# Patient Record
Sex: MALE | Race: BLACK OR AFRICAN AMERICAN | Employment: UNEMPLOYED | URBAN - METROPOLITAN AREA
[De-identification: names, ages, dates, MRNs, and addresses within clinical notes are randomized per-mention and may not be internally consistent; named-entity substitution may affect disease eponyms.]

---

## 2019-11-26 ENCOUNTER — APPOINTMENT (OUTPATIENT)
Dept: CT IMAGING | Age: 19
End: 2019-11-26
Payer: COMMERCIAL

## 2019-11-26 ENCOUNTER — HOSPITAL ENCOUNTER (EMERGENCY)
Age: 19
Discharge: HOME | End: 2019-11-26
Attending: EMERGENCY MEDICINE
Payer: COMMERCIAL

## 2019-11-26 ENCOUNTER — APPOINTMENT (OUTPATIENT)
Dept: GENERAL RADIOLOGY | Age: 19
End: 2019-11-26
Payer: COMMERCIAL

## 2019-11-26 VITALS
SYSTOLIC BLOOD PRESSURE: 121 MMHG | WEIGHT: 180 LBS | DIASTOLIC BLOOD PRESSURE: 62 MMHG | RESPIRATION RATE: 16 BRPM | HEIGHT: 67 IN | HEART RATE: 56 BPM | BODY MASS INDEX: 28.25 KG/M2 | OXYGEN SATURATION: 99 % | TEMPERATURE: 98 F

## 2019-11-26 DIAGNOSIS — R07.9 CHEST PAIN, UNSPECIFIED TYPE: ICD-10-CM

## 2019-11-26 DIAGNOSIS — D57.00 SICKLE CELL PAIN CRISIS (HCC): Primary | ICD-10-CM

## 2019-11-26 LAB
A/G RATIO: 1.7 (ref 1.1–2.2)
ALBUMIN SERPL-MCNC: 4.7 G/DL (ref 3.4–5)
ALP BLD-CCNC: 57 U/L (ref 40–129)
ALT SERPL-CCNC: 15 U/L (ref 10–40)
ANION GAP SERPL CALCULATED.3IONS-SCNC: 12 MMOL/L (ref 3–16)
ANISOCYTOSIS: ABNORMAL
AST SERPL-CCNC: 40 U/L (ref 15–37)
BASOPHILIC STIPPLING: ABNORMAL
BASOPHILS ABSOLUTE: 0 K/UL (ref 0–0.2)
BASOPHILS RELATIVE PERCENT: 0 %
BILIRUB SERPL-MCNC: 4.8 MG/DL (ref 0–1)
BUN BLDV-MCNC: 8 MG/DL (ref 7–20)
BURR CELLS: ABNORMAL
CALCIUM SERPL-MCNC: 9.1 MG/DL (ref 8.3–10.6)
CHLORIDE BLD-SCNC: 107 MMOL/L (ref 99–110)
CO2: 23 MMOL/L (ref 21–32)
CREAT SERPL-MCNC: 0.6 MG/DL (ref 0.9–1.3)
CRENATED RBC'S: ABNORMAL
EKG ATRIAL RATE: 56 BPM
EKG DIAGNOSIS: NORMAL
EKG P AXIS: 4 DEGREES
EKG P-R INTERVAL: 164 MS
EKG Q-T INTERVAL: 418 MS
EKG QRS DURATION: 98 MS
EKG QTC CALCULATION (BAZETT): 403 MS
EKG R AXIS: 72 DEGREES
EKG T AXIS: 14 DEGREES
EKG VENTRICULAR RATE: 56 BPM
EOSINOPHILS ABSOLUTE: 0 K/UL (ref 0–0.6)
EOSINOPHILS RELATIVE PERCENT: 0 %
GFR AFRICAN AMERICAN: >60
GFR NON-AFRICAN AMERICAN: >60
GLOBULIN: 2.8 G/DL
GLUCOSE BLD-MCNC: 89 MG/DL (ref 70–99)
HCT VFR BLD CALC: 27.8 % (ref 40.5–52.5)
HEMATOLOGY PATH CONSULT: YES
HEMOGLOBIN: 9.6 G/DL (ref 13.5–17.5)
HOWELL-JOLLY BODIES: ABNORMAL
HYPOCHROMIA: ABNORMAL
IMMATURE RETIC FRACT: 0.67 (ref 0.21–0.37)
LYMPHOCYTES ABSOLUTE: 1.3 K/UL (ref 1–5.1)
LYMPHOCYTES RELATIVE PERCENT: 13 %
MACROCYTES: ABNORMAL
MCH RBC QN AUTO: 35.3 PG (ref 26–34)
MCHC RBC AUTO-ENTMCNC: 34.6 G/DL (ref 31–36)
MCV RBC AUTO: 101.9 FL (ref 80–100)
MICROCYTES: ABNORMAL
MONOCYTES ABSOLUTE: 0.5 K/UL (ref 0–1.3)
MONOCYTES RELATIVE PERCENT: 5 %
NEUTROPHILS ABSOLUTE: 8.2 K/UL (ref 1.7–7.7)
NEUTROPHILS RELATIVE PERCENT: 82 %
NUCLEATED RED BLOOD CELLS: 3 /100 WBC
OVALOCYTES: ABNORMAL
PDW BLD-RTO: 20.6 % (ref 12.4–15.4)
PLATELET # BLD: 471 K/UL (ref 135–450)
PLATELET SLIDE REVIEW: ABNORMAL
PMV BLD AUTO: 8.9 FL (ref 5–10.5)
POIKILOCYTES: ABNORMAL
POLYCHROMASIA: ABNORMAL
POTASSIUM REFLEX MAGNESIUM: 4.8 MMOL/L (ref 3.5–5.1)
PRO-BNP: 20 PG/ML (ref 0–124)
RBC # BLD: 2.73 M/UL (ref 4.2–5.9)
RETICULOCYTE ABSOLUTE COUNT: 0.37 M/UL
RETICULOCYTE COUNT PCT: 13.52 % (ref 0.5–2.18)
SCHISTOCYTES: ABNORMAL
SICKLE CELLS: ABNORMAL
SLIDE REVIEW: ABNORMAL
SODIUM BLD-SCNC: 142 MMOL/L (ref 136–145)
TARGET CELLS: ABNORMAL
TEAR DROP CELLS: ABNORMAL
TOTAL PROTEIN: 7.5 G/DL (ref 6.4–8.2)
TROPONIN: <0.01 NG/ML
WBC # BLD: 10 K/UL (ref 4–11)

## 2019-11-26 PROCEDURE — 93005 ELECTROCARDIOGRAM TRACING: CPT | Performed by: EMERGENCY MEDICINE

## 2019-11-26 PROCEDURE — 83880 ASSAY OF NATRIURETIC PEPTIDE: CPT

## 2019-11-26 PROCEDURE — 96374 THER/PROPH/DIAG INJ IV PUSH: CPT

## 2019-11-26 PROCEDURE — 99284 EMERGENCY DEPT VISIT MOD MDM: CPT

## 2019-11-26 PROCEDURE — 93010 ELECTROCARDIOGRAM REPORT: CPT | Performed by: INTERNAL MEDICINE

## 2019-11-26 PROCEDURE — 85025 COMPLETE CBC W/AUTO DIFF WBC: CPT

## 2019-11-26 PROCEDURE — 71260 CT THORAX DX C+: CPT

## 2019-11-26 PROCEDURE — 85045 AUTOMATED RETICULOCYTE COUNT: CPT

## 2019-11-26 PROCEDURE — 71046 X-RAY EXAM CHEST 2 VIEWS: CPT

## 2019-11-26 PROCEDURE — 80053 COMPREHEN METABOLIC PANEL: CPT

## 2019-11-26 PROCEDURE — 84484 ASSAY OF TROPONIN QUANT: CPT

## 2019-11-26 PROCEDURE — 36415 COLL VENOUS BLD VENIPUNCTURE: CPT

## 2019-11-26 PROCEDURE — 6370000000 HC RX 637 (ALT 250 FOR IP): Performed by: PHYSICIAN ASSISTANT

## 2019-11-26 PROCEDURE — 96376 TX/PRO/DX INJ SAME DRUG ADON: CPT

## 2019-11-26 PROCEDURE — 6360000004 HC RX CONTRAST MEDICATION: Performed by: EMERGENCY MEDICINE

## 2019-11-26 PROCEDURE — 96375 TX/PRO/DX INJ NEW DRUG ADDON: CPT

## 2019-11-26 PROCEDURE — 6360000002 HC RX W HCPCS: Performed by: EMERGENCY MEDICINE

## 2019-11-26 PROCEDURE — 2580000003 HC RX 258: Performed by: EMERGENCY MEDICINE

## 2019-11-26 RX ORDER — HYDROXYUREA 500 MG/1
CAPSULE ORAL DAILY
COMMUNITY
End: 2020-07-22

## 2019-11-26 RX ORDER — OXYCODONE HYDROCHLORIDE AND ACETAMINOPHEN 5; 325 MG/1; MG/1
1 TABLET ORAL ONCE
Status: COMPLETED | OUTPATIENT
Start: 2019-11-26 | End: 2019-11-26

## 2019-11-26 RX ORDER — HYDROMORPHONE HYDROCHLORIDE 1 MG/ML
1 INJECTION, SOLUTION INTRAMUSCULAR; INTRAVENOUS; SUBCUTANEOUS
Status: COMPLETED | OUTPATIENT
Start: 2019-11-26 | End: 2019-11-26

## 2019-11-26 RX ORDER — KETOROLAC TROMETHAMINE 30 MG/ML
30 INJECTION, SOLUTION INTRAMUSCULAR; INTRAVENOUS ONCE
Status: COMPLETED | OUTPATIENT
Start: 2019-11-26 | End: 2019-11-26

## 2019-11-26 RX ORDER — ONDANSETRON 2 MG/ML
4 INJECTION INTRAMUSCULAR; INTRAVENOUS
Status: DISCONTINUED | OUTPATIENT
Start: 2019-11-26 | End: 2019-11-26 | Stop reason: HOSPADM

## 2019-11-26 RX ORDER — 0.9 % SODIUM CHLORIDE 0.9 %
1000 INTRAVENOUS SOLUTION INTRAVENOUS ONCE
Status: COMPLETED | OUTPATIENT
Start: 2019-11-26 | End: 2019-11-26

## 2019-11-26 RX ADMIN — IOPAMIDOL 75 ML: 755 INJECTION, SOLUTION INTRAVENOUS at 14:55

## 2019-11-26 RX ADMIN — ONDANSETRON 4 MG: 2 INJECTION INTRAMUSCULAR; INTRAVENOUS at 13:51

## 2019-11-26 RX ADMIN — HYDROMORPHONE HYDROCHLORIDE 1 MG: 1 INJECTION, SOLUTION INTRAMUSCULAR; INTRAVENOUS; SUBCUTANEOUS at 13:52

## 2019-11-26 RX ADMIN — HYDROMORPHONE HYDROCHLORIDE 1 MG: 1 INJECTION, SOLUTION INTRAMUSCULAR; INTRAVENOUS; SUBCUTANEOUS at 15:22

## 2019-11-26 RX ADMIN — SODIUM CHLORIDE 1000 ML: 9 INJECTION, SOLUTION INTRAVENOUS at 13:51

## 2019-11-26 RX ADMIN — OXYCODONE HYDROCHLORIDE AND ACETAMINOPHEN 1 TABLET: 5; 325 TABLET ORAL at 16:06

## 2019-11-26 RX ADMIN — KETOROLAC TROMETHAMINE 30 MG: 30 INJECTION, SOLUTION INTRAMUSCULAR at 13:51

## 2019-11-26 ASSESSMENT — PAIN DESCRIPTION - PAIN TYPE: TYPE: CHRONIC PAIN

## 2019-11-26 ASSESSMENT — PAIN SCALES - GENERAL
PAINLEVEL_OUTOF10: 8
PAINLEVEL_OUTOF10: 8
PAINLEVEL_OUTOF10: 7
PAINLEVEL_OUTOF10: 7

## 2019-11-27 LAB — HEMATOLOGY PATH CONSULT: NORMAL

## 2019-12-18 ENCOUNTER — HOSPITAL ENCOUNTER (OUTPATIENT)
Dept: CARDIOLOGY | Age: 19
Discharge: HOME | End: 2019-12-18
Payer: COMMERCIAL

## 2019-12-18 LAB
LV EF: 55 %
LVEF MODALITY: NORMAL

## 2019-12-18 PROCEDURE — 93306 TTE W/DOPPLER COMPLETE: CPT

## 2020-02-08 ENCOUNTER — HOSPITAL ENCOUNTER (EMERGENCY)
Age: 20
Discharge: HOME | End: 2020-02-08
Attending: EMERGENCY MEDICINE
Payer: COMMERCIAL

## 2020-02-08 VITALS
DIASTOLIC BLOOD PRESSURE: 56 MMHG | SYSTOLIC BLOOD PRESSURE: 102 MMHG | WEIGHT: 180 LBS | BODY MASS INDEX: 28.25 KG/M2 | HEART RATE: 81 BPM | OXYGEN SATURATION: 98 % | RESPIRATION RATE: 18 BRPM | TEMPERATURE: 98.5 F | HEIGHT: 67 IN

## 2020-02-08 LAB
A/G RATIO: 1.5 (ref 1.1–2.2)
ALBUMIN SERPL-MCNC: 4.5 G/DL (ref 3.4–5)
ALP BLD-CCNC: 66 U/L (ref 40–129)
ALT SERPL-CCNC: 14 U/L (ref 10–40)
ANION GAP SERPL CALCULATED.3IONS-SCNC: 11 MMOL/L (ref 3–16)
ANISOCYTOSIS: ABNORMAL
AST SERPL-CCNC: 18 U/L (ref 15–37)
BASOPHILS ABSOLUTE: 0.1 K/UL (ref 0–0.2)
BASOPHILS RELATIVE PERCENT: 1.3 %
BILIRUB SERPL-MCNC: 3.2 MG/DL (ref 0–1)
BUN BLDV-MCNC: 8 MG/DL (ref 7–20)
CALCIUM SERPL-MCNC: 9.2 MG/DL (ref 8.3–10.6)
CHLORIDE BLD-SCNC: 106 MMOL/L (ref 99–110)
CO2: 24 MMOL/L (ref 21–32)
CREAT SERPL-MCNC: 0.7 MG/DL (ref 0.9–1.3)
EOSINOPHILS ABSOLUTE: 0.1 K/UL (ref 0–0.6)
EOSINOPHILS RELATIVE PERCENT: 1.1 %
GFR AFRICAN AMERICAN: >60
GFR NON-AFRICAN AMERICAN: >60
GLOBULIN: 3 G/DL
GLUCOSE BLD-MCNC: 88 MG/DL (ref 70–99)
HCT VFR BLD CALC: 27.1 % (ref 40.5–52.5)
HEMOGLOBIN: 9.1 G/DL (ref 13.5–17.5)
HYPOCHROMIA: ABNORMAL
IMMATURE RETIC FRACT: 0.62 (ref 0.21–0.37)
LYMPHOCYTES ABSOLUTE: 2.7 K/UL (ref 1–5.1)
LYMPHOCYTES RELATIVE PERCENT: 23 %
MACROCYTES: ABNORMAL
MCH RBC QN AUTO: 33.3 PG (ref 26–34)
MCHC RBC AUTO-ENTMCNC: 33.6 G/DL (ref 31–36)
MCV RBC AUTO: 99 FL (ref 80–100)
MONOCYTES ABSOLUTE: 1.4 K/UL (ref 0–1.3)
MONOCYTES RELATIVE PERCENT: 11.8 %
NEUTROPHILS ABSOLUTE: 7.3 K/UL (ref 1.7–7.7)
NEUTROPHILS RELATIVE PERCENT: 62.8 %
PDW BLD-RTO: 20 % (ref 12.4–15.4)
PLATELET # BLD: 493 K/UL (ref 135–450)
PLATELET SLIDE REVIEW: ABNORMAL
PMV BLD AUTO: 8.3 FL (ref 5–10.5)
POIKILOCYTES: ABNORMAL
POLYCHROMASIA: ABNORMAL
POTASSIUM SERPL-SCNC: 4.3 MMOL/L (ref 3.5–5.1)
RBC # BLD: 2.74 M/UL (ref 4.2–5.9)
RETICULOCYTE ABSOLUTE COUNT: 0.29 M/UL
RETICULOCYTE COUNT PCT: 10.53 % (ref 0.5–2.18)
SICKLE CELLS: ABNORMAL
SLIDE REVIEW: ABNORMAL
SODIUM BLD-SCNC: 141 MMOL/L (ref 136–145)
STOMATOCYTES: ABNORMAL
TARGET CELLS: ABNORMAL
TOTAL PROTEIN: 7.5 G/DL (ref 6.4–8.2)
WBC # BLD: 11.7 K/UL (ref 4–11)

## 2020-02-08 PROCEDURE — 85025 COMPLETE CBC W/AUTO DIFF WBC: CPT

## 2020-02-08 PROCEDURE — 96375 TX/PRO/DX INJ NEW DRUG ADDON: CPT

## 2020-02-08 PROCEDURE — 96376 TX/PRO/DX INJ SAME DRUG ADON: CPT

## 2020-02-08 PROCEDURE — 2580000003 HC RX 258: Performed by: EMERGENCY MEDICINE

## 2020-02-08 PROCEDURE — 99284 EMERGENCY DEPT VISIT MOD MDM: CPT

## 2020-02-08 PROCEDURE — 80053 COMPREHEN METABOLIC PANEL: CPT

## 2020-02-08 PROCEDURE — 85045 AUTOMATED RETICULOCYTE COUNT: CPT

## 2020-02-08 PROCEDURE — 96374 THER/PROPH/DIAG INJ IV PUSH: CPT

## 2020-02-08 PROCEDURE — 6370000000 HC RX 637 (ALT 250 FOR IP): Performed by: EMERGENCY MEDICINE

## 2020-02-08 PROCEDURE — 6360000002 HC RX W HCPCS: Performed by: EMERGENCY MEDICINE

## 2020-02-08 RX ORDER — OXYCODONE HYDROCHLORIDE 5 MG/1
10 TABLET ORAL ONCE
Status: COMPLETED | OUTPATIENT
Start: 2020-02-08 | End: 2020-02-08

## 2020-02-08 RX ORDER — 0.9 % SODIUM CHLORIDE 0.9 %
1000 INTRAVENOUS SOLUTION INTRAVENOUS ONCE
Status: COMPLETED | OUTPATIENT
Start: 2020-02-08 | End: 2020-02-08

## 2020-02-08 RX ORDER — FENTANYL CITRATE 50 UG/ML
75 INJECTION, SOLUTION INTRAMUSCULAR; INTRAVENOUS ONCE
Status: COMPLETED | OUTPATIENT
Start: 2020-02-08 | End: 2020-02-08

## 2020-02-08 RX ORDER — OXYCODONE HYDROCHLORIDE 10 MG/1
10 TABLET ORAL EVERY 6 HOURS PRN
Qty: 30 TABLET | Refills: 0 | Status: SHIPPED | OUTPATIENT
Start: 2020-02-08 | End: 2020-03-09

## 2020-02-08 RX ORDER — KETOROLAC TROMETHAMINE 30 MG/ML
15 INJECTION, SOLUTION INTRAMUSCULAR; INTRAVENOUS ONCE
Status: COMPLETED | OUTPATIENT
Start: 2020-02-08 | End: 2020-02-08

## 2020-02-08 RX ORDER — OXYCODONE HYDROCHLORIDE 15 MG/1
15 TABLET, FILM COATED, EXTENDED RELEASE ORAL PRN
COMMUNITY
End: 2020-02-08

## 2020-02-08 RX ADMIN — KETOROLAC TROMETHAMINE 15 MG: 30 INJECTION, SOLUTION INTRAMUSCULAR at 20:46

## 2020-02-08 RX ADMIN — OXYCODONE 10 MG: 5 TABLET ORAL at 22:25

## 2020-02-08 RX ADMIN — FENTANYL CITRATE 75 MCG: 50 INJECTION, SOLUTION INTRAMUSCULAR; INTRAVENOUS at 20:45

## 2020-02-08 RX ADMIN — FENTANYL CITRATE 75 MCG: 50 INJECTION, SOLUTION INTRAMUSCULAR; INTRAVENOUS at 21:39

## 2020-02-08 RX ADMIN — SODIUM CHLORIDE 1000 ML: 9 INJECTION, SOLUTION INTRAVENOUS at 20:46

## 2020-02-08 ASSESSMENT — PAIN SCALES - GENERAL
PAINLEVEL_OUTOF10: 8
PAINLEVEL_OUTOF10: 8
PAINLEVEL_OUTOF10: 5
PAINLEVEL_OUTOF10: 8

## 2020-02-08 ASSESSMENT — PAIN DESCRIPTION - DESCRIPTORS: DESCRIPTORS: ACHING

## 2020-02-08 ASSESSMENT — PAIN DESCRIPTION - LOCATION: LOCATION: LEG

## 2020-02-08 ASSESSMENT — PAIN DESCRIPTION - ORIENTATION: ORIENTATION: RIGHT;LEFT

## 2020-02-08 ASSESSMENT — PAIN DESCRIPTION - PAIN TYPE: TYPE: ACUTE PAIN

## 2020-02-09 NOTE — ED PROVIDER NOTES
History reviewed. No pertinent family history. SOCIAL HISTORY       Social History     Socioeconomic History    Marital status: Single     Spouse name: None    Number of children: None    Years of education: None    Highest education level: None   Occupational History    None   Social Needs    Financial resource strain: None    Food insecurity:     Worry: None     Inability: None    Transportation needs:     Medical: None     Non-medical: None   Tobacco Use    Smoking status: Never Smoker    Smokeless tobacco: Never Used   Substance and Sexual Activity    Alcohol use: Not Currently    Drug use: None    Sexual activity: None   Lifestyle    Physical activity:     Days per week: None     Minutes per session: None    Stress: None   Relationships    Social connections:     Talks on phone: None     Gets together: None     Attends Buddhism service: None     Active member of club or organization: None     Attends meetings of clubs or organizations: None     Relationship status: None    Intimate partner violence:     Fear of current or ex partner: None     Emotionally abused: None     Physically abused: None     Forced sexual activity: None   Other Topics Concern    None   Social History Narrative    None       SCREENINGS               PHYSICAL EXAM    (up to 7 for level 4, 8 or more for level 5)     ED Triage Vitals [02/08/20 1910]   BP Temp Temp Source Pulse Resp SpO2 Height Weight   (!) 102/51 98.6 °F (37 °C) Oral 75 18 97 % 5' 7\" (1.702 m) 180 lb (81.6 kg)       Physical Exam    General appearance:  Cooperative. No acute distress. Skin:  Warm. Dry. Eye:  Extraocular movements intact. Ears, nose, mouth and throat:  Oral mucosa moist,  Neck:  Trachea midline. Heart:  Regular rate and rhythm  Perfusion:  intact  Respiratory:  Lungs clear to auscultation bilaterally. Respirations nonlabored. Abdominal:   Non distended. Nontender  Neurological:  Alert and oriented x 3.   Moves all extremities spontaneously  Musculoskeletal:   Normal ROM, no deformities          Psychiatric:  Normal mood      DIAGNOSTIC RESULTS       Labs Reviewed   COMPREHENSIVE METABOLIC PANEL - Abnormal; Notable for the following components:       Result Value    CREATININE 0.7 (*)     Total Bilirubin 3.2 (*)     All other components within normal limits    Narrative:     Performed at:  [address] [place]   Phone [phone]   CBC WITH AUTO DIFFERENTIAL - Abnormal; Notable for the following components:    WBC 11.7 (*)     RBC 2.74 (*)     Hemoglobin 9.1 (*)     Hematocrit 27.1 (*)     RDW 20.0 (*)     Platelets 513 (*)     Monocytes Absolute 1.4 (*)     Anisocytosis 3+ (*)     Macrocytes 1+ (*)     Polychromasia 1+ (*)     Hypochromia 1+ (*)     Poikilocytes 2+ (*)     Stomatocytes 1+ (*)     Target Cells 1+ (*)     Sickle Cells 2+ (*)     All other components within normal limits    Narrative:     Performed at:  [address] [place]   Phone [phone]   RETICULOCYTES - Abnormal; Notable for the following components:    Retic Ct Pct 10.53 (*)     Immature Retic Fract 0.62 (*)     All other components within normal limits    Narrative:     Performed at:  [place] [address] [address] [place]   Phone [phone]       Interpretation per the Radiologist below, if obtained/available at the time of this note:    No orders to display       All other labs/imaging were within normal range or not returned as of this dictation. EMERGENCY DEPARTMENT COURSE and DIFFERENTIAL DIAGNOSIS/MDM:   Vitals:    Vitals:    02/08/20 1910   BP: (!) 102/51   Pulse: 75   Resp: 18   Temp: 98.6 °F (37 °C)   TempSrc: Oral   SpO2: 97%   Weight: 180 lb (81.6 kg)   Height: 5' 7\" (1.702 m)       Patient presents emergency department today with a sickle cell pain crisis.

## 2020-03-14 ENCOUNTER — APPOINTMENT (OUTPATIENT)
Dept: GENERAL RADIOLOGY | Age: 20
End: 2020-03-14
Payer: COMMERCIAL

## 2020-03-14 ENCOUNTER — HOSPITAL ENCOUNTER (EMERGENCY)
Age: 20
Discharge: HOME | End: 2020-03-14
Attending: EMERGENCY MEDICINE
Payer: COMMERCIAL

## 2020-03-14 VITALS
SYSTOLIC BLOOD PRESSURE: 118 MMHG | BODY MASS INDEX: 28.25 KG/M2 | DIASTOLIC BLOOD PRESSURE: 67 MMHG | WEIGHT: 180 LBS | RESPIRATION RATE: 18 BRPM | OXYGEN SATURATION: 98 % | HEIGHT: 67 IN | TEMPERATURE: 98.1 F | HEART RATE: 67 BPM

## 2020-03-14 LAB
A/G RATIO: 1.7 (ref 1.1–2.2)
ALBUMIN SERPL-MCNC: 4.3 G/DL (ref 3.4–5)
ALP BLD-CCNC: 55 U/L (ref 40–129)
ALT SERPL-CCNC: 15 U/L (ref 10–40)
ANION GAP SERPL CALCULATED.3IONS-SCNC: 10 MMOL/L (ref 3–16)
AST SERPL-CCNC: 23 U/L (ref 15–37)
BILIRUB SERPL-MCNC: 5.1 MG/DL (ref 0–1)
BILIRUBIN URINE: NEGATIVE
BLOOD, URINE: NEGATIVE
BUN BLDV-MCNC: 7 MG/DL (ref 7–20)
CALCIUM SERPL-MCNC: 9.1 MG/DL (ref 8.3–10.6)
CHLORIDE BLD-SCNC: 108 MMOL/L (ref 99–110)
CLARITY: CLEAR
CO2: 26 MMOL/L (ref 21–32)
COLOR: YELLOW
CREAT SERPL-MCNC: 0.6 MG/DL (ref 0.9–1.3)
GFR AFRICAN AMERICAN: >60
GFR NON-AFRICAN AMERICAN: >60
GLOBULIN: 2.6 G/DL
GLUCOSE BLD-MCNC: 98 MG/DL (ref 70–99)
GLUCOSE URINE: NEGATIVE MG/DL
HCT VFR BLD CALC: 25 % (ref 40.5–52.5)
HEMOGLOBIN: 8.8 G/DL (ref 13.5–17.5)
IMMATURE RETIC FRACT: 0.69 (ref 0.21–0.37)
KETONES, URINE: NEGATIVE MG/DL
LEUKOCYTE ESTERASE, URINE: NEGATIVE
MCH RBC QN AUTO: 34.1 PG (ref 26–34)
MCHC RBC AUTO-ENTMCNC: 35.3 G/DL (ref 31–36)
MCV RBC AUTO: 96.5 FL (ref 80–100)
MICROSCOPIC EXAMINATION: ABNORMAL
NITRITE, URINE: NEGATIVE
PDW BLD-RTO: 25 % (ref 12.4–15.4)
PH UA: 7 (ref 5–8)
PLATELET # BLD: 419 K/UL (ref 135–450)
PMV BLD AUTO: 8.9 FL (ref 5–10.5)
POTASSIUM SERPL-SCNC: 3.8 MMOL/L (ref 3.5–5.1)
PROTEIN UA: NEGATIVE MG/DL
RAPID INFLUENZA  B AGN: NEGATIVE
RAPID INFLUENZA A AGN: NEGATIVE
RBC # BLD: 2.59 M/UL (ref 4.2–5.9)
RETICULOCYTE ABSOLUTE COUNT: 0.29 M/UL
RETICULOCYTE COUNT PCT: 11.25 % (ref 0.5–2.18)
S PYO AG THROAT QL: NEGATIVE
SODIUM BLD-SCNC: 144 MMOL/L (ref 136–145)
SPECIFIC GRAVITY UA: 1.02 (ref 1–1.03)
TOTAL PROTEIN: 6.9 G/DL (ref 6.4–8.2)
URINE REFLEX TO CULTURE: ABNORMAL
URINE TYPE: ABNORMAL
UROBILINOGEN, URINE: >=8 E.U./DL
WBC # BLD: 14 K/UL (ref 4–11)

## 2020-03-14 PROCEDURE — 87081 CULTURE SCREEN ONLY: CPT

## 2020-03-14 PROCEDURE — 2580000003 HC RX 258: Performed by: NURSE PRACTITIONER

## 2020-03-14 PROCEDURE — 87880 STREP A ASSAY W/OPTIC: CPT

## 2020-03-14 PROCEDURE — 81003 URINALYSIS AUTO W/O SCOPE: CPT

## 2020-03-14 PROCEDURE — 80053 COMPREHEN METABOLIC PANEL: CPT

## 2020-03-14 PROCEDURE — 96374 THER/PROPH/DIAG INJ IV PUSH: CPT

## 2020-03-14 PROCEDURE — 87804 INFLUENZA ASSAY W/OPTIC: CPT

## 2020-03-14 PROCEDURE — 85027 COMPLETE CBC AUTOMATED: CPT

## 2020-03-14 PROCEDURE — 71046 X-RAY EXAM CHEST 2 VIEWS: CPT

## 2020-03-14 PROCEDURE — 6360000002 HC RX W HCPCS: Performed by: NURSE PRACTITIONER

## 2020-03-14 PROCEDURE — 6370000000 HC RX 637 (ALT 250 FOR IP): Performed by: NURSE PRACTITIONER

## 2020-03-14 PROCEDURE — 96375 TX/PRO/DX INJ NEW DRUG ADDON: CPT

## 2020-03-14 PROCEDURE — 99284 EMERGENCY DEPT VISIT MOD MDM: CPT

## 2020-03-14 PROCEDURE — 85045 AUTOMATED RETICULOCYTE COUNT: CPT

## 2020-03-14 RX ORDER — KETOROLAC TROMETHAMINE 30 MG/ML
15 INJECTION, SOLUTION INTRAMUSCULAR; INTRAVENOUS ONCE
Status: DISCONTINUED | OUTPATIENT
Start: 2020-03-14 | End: 2020-03-14

## 2020-03-14 RX ORDER — OXYCODONE HYDROCHLORIDE 10 MG/1
1 TABLET ORAL
COMMUNITY
Start: 2020-01-08 | End: 2020-03-14 | Stop reason: ALTCHOICE

## 2020-03-14 RX ORDER — FENTANYL CITRATE 50 UG/ML
75 INJECTION, SOLUTION INTRAMUSCULAR; INTRAVENOUS ONCE
Status: COMPLETED | OUTPATIENT
Start: 2020-03-14 | End: 2020-03-14

## 2020-03-14 RX ORDER — OXYCODONE HYDROCHLORIDE AND ACETAMINOPHEN 5; 325 MG/1; MG/1
1 TABLET ORAL ONCE
Status: COMPLETED | OUTPATIENT
Start: 2020-03-14 | End: 2020-03-14

## 2020-03-14 RX ORDER — ACETAMINOPHEN 500 MG
1000 TABLET ORAL ONCE
Status: DISCONTINUED | OUTPATIENT
Start: 2020-03-14 | End: 2020-03-14

## 2020-03-14 RX ORDER — 0.9 % SODIUM CHLORIDE 0.9 %
1000 INTRAVENOUS SOLUTION INTRAVENOUS ONCE
Status: COMPLETED | OUTPATIENT
Start: 2020-03-14 | End: 2020-03-14

## 2020-03-14 RX ORDER — OXYCODONE HYDROCHLORIDE 10 MG/1
10 TABLET ORAL EVERY 6 HOURS PRN
Qty: 12 TABLET | Refills: 0 | Status: SHIPPED | OUTPATIENT
Start: 2020-03-14 | End: 2020-03-17

## 2020-03-14 RX ADMIN — FENTANYL CITRATE 75 MCG: 50 INJECTION, SOLUTION INTRAMUSCULAR; INTRAVENOUS at 19:56

## 2020-03-14 RX ADMIN — HYDROMORPHONE HYDROCHLORIDE 1 MG: 1 INJECTION, SOLUTION INTRAMUSCULAR; INTRAVENOUS; SUBCUTANEOUS at 21:11

## 2020-03-14 RX ADMIN — SODIUM CHLORIDE 1000 ML: 9 INJECTION, SOLUTION INTRAVENOUS at 19:01

## 2020-03-14 RX ADMIN — OXYCODONE HYDROCHLORIDE AND ACETAMINOPHEN 1 TABLET: 5; 325 TABLET ORAL at 18:53

## 2020-03-14 ASSESSMENT — PAIN DESCRIPTION - LOCATION: LOCATION: BACK

## 2020-03-14 ASSESSMENT — ENCOUNTER SYMPTOMS
SHORTNESS OF BREATH: 0
COUGH: 1
ABDOMINAL PAIN: 0
CONSTIPATION: 0
BACK PAIN: 1
ABDOMINAL DISTENTION: 0
DIARRHEA: 0
NAUSEA: 0
WHEEZING: 0
VOMITING: 0
EYES NEGATIVE: 1
SORE THROAT: 1

## 2020-03-14 ASSESSMENT — PAIN SCALES - GENERAL
PAINLEVEL_OUTOF10: 8
PAINLEVEL_OUTOF10: 8
PAINLEVEL_OUTOF10: 9
PAINLEVEL_OUTOF10: 8

## 2020-03-14 ASSESSMENT — PAIN DESCRIPTION - PROGRESSION: CLINICAL_PROGRESSION: GRADUALLY WORSENING

## 2020-03-14 ASSESSMENT — PAIN DESCRIPTION - PAIN TYPE: TYPE: ACUTE PAIN

## 2020-03-14 ASSESSMENT — PAIN DESCRIPTION - ONSET: ONSET: ON-GOING

## 2020-03-14 ASSESSMENT — PAIN DESCRIPTION - DESCRIPTORS: DESCRIPTORS: ACHING

## 2020-03-14 ASSESSMENT — PAIN DESCRIPTION - FREQUENCY: FREQUENCY: CONTINUOUS

## 2020-03-14 NOTE — ED PROVIDER NOTES
**[facility]  [facility]  EMERGENCY DEPARTMENT ENCOUNTER      Pt Name: Rosana MAUROXP:[id number]  [place] 2000  Date of evaluation: 3/14/2020  Provider: CARMELITA Blanc CNP      Chief Complaint:    Chief Complaint   Patient presents with    Pharyngitis     Patient reports cough and sore throat for 2 days, denies travel hx of [facility]        Nursing Notes, Past Medical Hx, Past Surgical Hx, Social Hx, Allergies, and Family Hx were all reviewed and agreed with or any disagreements were addressed in the HPI.    HPI:  (Location, Duration, Timing, Severity, Quality, Assoc Sx, Context, Modifying factors)  This is a  20 y.o. male who present with two days of sore throat and cough. States he has not traveled outside of the [address] [facility] [facility] or been around anyone who has tested positive for the [employer] Lisa virus. Denies any chest pain, fevers, shortness of breath, headache, nausea, vomiting, body aches or weakness. States he does have back pain and does have sickle cell disease. Rates pain a 8/10    PastMedical/Surgical History:      Diagnosis Date    Asthma     Sickle cell anemia (HCC)          Procedure Laterality Date    SPLENECTOMY         Medications:  Previous Medications    HYDROXYUREA (HYDREA) 500 MG CHEMO CAPSULE    Take by mouth daily         Review of Systems:  Review of Systems   Constitutional: Negative for activity change, appetite change, chills, diaphoresis, fatigue and fever. HENT: Positive for sore throat. Eyes: Negative. Respiratory: Positive for cough. Negative for shortness of breath and wheezing. Cardiovascular: Negative for chest pain, palpitations and leg swelling. Gastrointestinal: Negative for abdominal distention, abdominal pain, constipation, diarrhea, nausea and vomiting. Endocrine: Negative. Genitourinary: Negative for difficulty urinating, dysuria, flank pain and hematuria.    Musculoskeletal: Positive for back pain. Negative for myalgias, neck pain and neck stiffness. Skin: Negative. Neurological: Negative for dizziness, seizures, syncope, speech difficulty, weakness, light-headedness, numbness and headaches. Hematological: Negative. Psychiatric/Behavioral: Negative. Positives and Pertinent negatives as per HPI. Except as noted above in the ROS, problem specific ROS was completed and is negative. Physical Exam:  Physical Exam  Constitutional:       General: He is not in acute distress. Appearance: Normal appearance. He is normal weight. He is not ill-appearing, toxic-appearing or diaphoretic. HENT:      Head: Normocephalic and atraumatic. Nose: Nose normal.      Mouth/Throat:      Mouth: Mucous membranes are moist.      Pharynx: Oropharynx is clear. No oropharyngeal exudate or posterior oropharyngeal erythema. Eyes:      Extraocular Movements: Extraocular movements intact. Conjunctiva/sclera: Conjunctivae normal.      Pupils: Pupils are equal, round, and reactive to light. Neck:      Musculoskeletal: Normal range of motion and neck supple. Cardiovascular:      Rate and Rhythm: Normal rate and regular rhythm. Pulses: Normal pulses. Heart sounds: Normal heart sounds. No murmur. No friction rub. No gallop. Pulmonary:      Effort: Pulmonary effort is normal. No respiratory distress. Breath sounds: Normal breath sounds. No stridor. No wheezing, rhonchi or rales. Chest:      Chest wall: No tenderness. Abdominal:      General: Abdomen is flat. Bowel sounds are normal. There is no distension. Palpations: There is no mass. Tenderness: There is no abdominal tenderness. There is no guarding. Musculoskeletal:         General: Tenderness present. No swelling. Lumbar back: He exhibits tenderness. He exhibits normal range of motion and no bony tenderness. Lymphadenopathy:      Cervical: No cervical adenopathy. Skin:     General: Skin is warm and dry. Capillary Refill: Capillary refill takes less than 2 seconds. Neurological:      General: No focal deficit present. Mental Status: He is alert and oriented to person, place, and time. Sensory: No sensory deficit. Gait: Gait normal.   Psychiatric:         Mood and Affect: Mood normal.         Behavior: Behavior normal.         Thought Content:  Thought content normal.         Judgment: Judgment normal.         MEDICAL DECISION MAKING    Vitals:    Vitals:    03/14/20 1814   BP: (!) 107/56   Pulse: 85   Resp: 16   Temp: 98.4 °F (36.9 °C)   TempSrc: Oral   SpO2: 95%   Weight: 180 lb (81.6 kg)   Height: 5' 7\" (1.702 m)       LABS:  Labs Reviewed   CBC - Abnormal; Notable for the following components:       Result Value    WBC 14.0 (*)     RBC 2.59 (*)     Hemoglobin 8.8 (*)     Hematocrit 25.0 (*)     MCH 34.1 (*)     RDW 25.0 (*)     All other components within normal limits    Narrative:     Performed at:  [place] [address] [address] [place]   Phone [phone]   COMPREHENSIVE METABOLIC PANEL - Abnormal; Notable for the following components:    CREATININE 0.6 (*)     Total Bilirubin 5.1 (*)     All other components within normal limits    Narrative:     Performed at:  [address] [place]   Phone [phone]   RETICULOCYTES - Abnormal; Notable for the following components:    Retic Ct Pct 11.25 (*)     Immature Retic Fract 0.69 (*)     All other components within normal limits    Narrative:     Performed at:  [place] [address] [facility]   Phone [phone]   URINE RT REFLEX TO CULTURE - Abnormal; Notable for the following components:    Urobilinogen, Urine >=8.0 (*)     All other components within normal limits    Narrative:     Performed at:  [place] [address] [facility] Pharynx is clear, uvula is midline and no exudate noted. No cervical adenopathy noted. Heart with RRR. Lungs are clear to auscultation throughout. No chest wall pain with palpation. Abdomen is soft, non-tender and non-distended. Lumbar spine with bilateral paraspinal tenderness of the back, no midline pain to palpation, FROM present. Bilateral upper and lower extremities with no pain to palpation and FROM is present. There is no pain noted over elbow or knee joints and no swelling of joints present. Distal pulses are present    Differentials: sickle cell crisis, strep pharyngitis, viral pharyngitis, URI, cough, bronchitis, pneumonia, acute chest syndrome    Labs: leukocytosis present and retic count is elevated. Negative flu and negative strep test noted    Xray: No radiographic evidence of acute cardiopulmonary disease. NS bolus given along with fentanyl, percocet and dilaudid with improvement of pain symptoms. Patient states he feels like at this point the pain is manageable and he is ready to go home    PDMP reviewed and patient was last discharged with oxycodone for pain and today we will be discharging with the same medication. A 3 days supply will be given from the ED  and patient has been given strict instructions to call PCP for follow up this week. Diagnosis: sickle cell crisis, viral pharyngitis, cough    The patient tolerated their visit well. I evaluated the patient. The physician was available for consultation as needed. The patient and / or the family were informed of the results of any tests, a time was given to answer questions, a plan was proposed and they agreed with plan. CLINICAL IMPRESSION:  1. Sickle cell pain crisis ([facility] [address]    2. Viral pharyngitis    3.  Cough        DISPOSITION Decision To Discharge 03/14/2020 10:35:59 PM      PATIENT REFERRED TO:  Indra Marrero MD  [address] [place] [address]  [phone]    Schedule an appointment as soon as possible for a visit in 3 days  For remi    [facility]  ED  [address] [facility] [address] [facility]  Go to   For new or worsening symptoms      DISCHARGE MEDICATIONS:  New Prescriptions    OXYCODONE HCL (OXY-IR) 10 MG IMMEDIATE RELEASE TABLET    Take 1 tablet by mouth every 6 hours as needed for Pain for up to 3 days. DISCONTINUED MEDICATIONS:  Discontinued Medications    OXYCODONE HCL (OXY-IR) 10 MG IMMEDIATE RELEASE TABLET    1 tablet.               (Please note the MDM and HPI sections of this note were completed with a voice recognition program.  Efforts were made to edit the dictations but occasionally words are mis-transcribed.)    Electronically signed, CARMELITA Zuniga CNP,        CARMELITA Zuniga CNP  03/14/20 Isaac Mcmillan. CARMELITA Stewart CNP  03/14/20 0605

## 2020-03-15 NOTE — ED NOTES
Patient discharged home alert and oriented, resp even and unlabored.  Patient denies needs or questions at this time       Stefanie Joy RN  03/14/20 5121

## 2020-03-17 LAB — S PYO THROAT QL CULT: NORMAL

## 2020-04-02 ENCOUNTER — APPOINTMENT (OUTPATIENT)
Dept: GENERAL RADIOLOGY | Age: 20
End: 2020-04-02
Payer: COMMERCIAL

## 2020-04-02 ENCOUNTER — APPOINTMENT (OUTPATIENT)
Dept: CT IMAGING | Age: 20
End: 2020-04-02
Payer: COMMERCIAL

## 2020-04-02 ENCOUNTER — HOSPITAL ENCOUNTER (EMERGENCY)
Age: 20
Discharge: HOME | End: 2020-04-02
Payer: COMMERCIAL

## 2020-04-02 VITALS
HEART RATE: 56 BPM | DIASTOLIC BLOOD PRESSURE: 59 MMHG | BODY MASS INDEX: 29.03 KG/M2 | SYSTOLIC BLOOD PRESSURE: 113 MMHG | TEMPERATURE: 98.2 F | HEIGHT: 67 IN | RESPIRATION RATE: 14 BRPM | WEIGHT: 185 LBS | OXYGEN SATURATION: 97 %

## 2020-04-02 LAB
A/G RATIO: 1.6 (ref 1.1–2.2)
ALBUMIN SERPL-MCNC: 4.4 G/DL (ref 3.4–5)
ALP BLD-CCNC: 56 U/L (ref 40–129)
ALT SERPL-CCNC: 12 U/L (ref 10–40)
ANION GAP SERPL CALCULATED.3IONS-SCNC: 10 MMOL/L (ref 3–16)
ANISOCYTOSIS: ABNORMAL
AST SERPL-CCNC: 42 U/L (ref 15–37)
ATYPICAL LYMPHOCYTE RELATIVE PERCENT: 1 % (ref 0–6)
BANDED NEUTROPHILS RELATIVE PERCENT: 3 % (ref 0–7)
BASOPHILS ABSOLUTE: 0 K/UL (ref 0–0.2)
BASOPHILS RELATIVE PERCENT: 0 %
BILIRUB SERPL-MCNC: 4.8 MG/DL (ref 0–1)
BUN BLDV-MCNC: 9 MG/DL (ref 7–20)
CALCIUM SERPL-MCNC: 9.5 MG/DL (ref 8.3–10.6)
CHLORIDE BLD-SCNC: 106 MMOL/L (ref 99–110)
CO2: 22 MMOL/L (ref 21–32)
CREAT SERPL-MCNC: <0.5 MG/DL (ref 0.9–1.3)
EOSINOPHILS ABSOLUTE: 0.4 K/UL (ref 0–0.6)
EOSINOPHILS RELATIVE PERCENT: 2 %
GFR AFRICAN AMERICAN: >60
GFR NON-AFRICAN AMERICAN: >60
GLOBULIN: 2.7 G/DL
GLUCOSE BLD-MCNC: 102 MG/DL (ref 70–99)
HCT VFR BLD CALC: 24.1 % (ref 40.5–52.5)
HEMOGLOBIN: 8.8 G/DL (ref 13.5–17.5)
HYPOCHROMIA: ABNORMAL
IMMATURE RETIC FRACT: 0.67 (ref 0.21–0.37)
LYMPHOCYTES ABSOLUTE: 3.1 K/UL (ref 1–5.1)
LYMPHOCYTES RELATIVE PERCENT: 16 %
MCH RBC QN AUTO: 33.6 PG (ref 26–34)
MCHC RBC AUTO-ENTMCNC: 36.5 G/DL (ref 31–36)
MCV RBC AUTO: 91.9 FL (ref 80–100)
MONOCYTES ABSOLUTE: 1.7 K/UL (ref 0–1.3)
MONOCYTES RELATIVE PERCENT: 9 %
MYELOCYTE PERCENT: 1 %
NEUTROPHILS ABSOLUTE: 13.2 K/UL (ref 1.7–7.7)
NEUTROPHILS RELATIVE PERCENT: 68 %
NUCLEATED RED BLOOD CELLS: 2 /100 WBC
NUCLEATED RED BLOOD CELLS: 2 /100 WBC
OVALOCYTES: ABNORMAL
PDW BLD-RTO: 23.3 % (ref 12.4–15.4)
PLATELET # BLD: 388 K/UL (ref 135–450)
PLATELET SLIDE REVIEW: ADEQUATE
PMV BLD AUTO: 8.6 FL (ref 5–10.5)
POLYCHROMASIA: ABNORMAL
POTASSIUM REFLEX MAGNESIUM: 4.9 MMOL/L (ref 3.5–5.1)
RBC # BLD: 2.62 M/UL (ref 4.2–5.9)
RETICULOCYTE ABSOLUTE COUNT: 0.33 M/UL
RETICULOCYTE COUNT PCT: 12.7 % (ref 0.5–2.18)
SCHISTOCYTES: ABNORMAL
SICKLE CELLS: ABNORMAL
SLIDE REVIEW: ABNORMAL
SODIUM BLD-SCNC: 138 MMOL/L (ref 136–145)
TARGET CELLS: ABNORMAL
TOTAL PROTEIN: 7.1 G/DL (ref 6.4–8.2)
WBC # BLD: 18.4 K/UL (ref 4–11)

## 2020-04-02 PROCEDURE — 99284 EMERGENCY DEPT VISIT MOD MDM: CPT

## 2020-04-02 PROCEDURE — 72128 CT CHEST SPINE W/O DYE: CPT

## 2020-04-02 PROCEDURE — 80053 COMPREHEN METABOLIC PANEL: CPT

## 2020-04-02 PROCEDURE — 85025 COMPLETE CBC W/AUTO DIFF WBC: CPT

## 2020-04-02 PROCEDURE — 72131 CT LUMBAR SPINE W/O DYE: CPT

## 2020-04-02 PROCEDURE — 85045 AUTOMATED RETICULOCYTE COUNT: CPT

## 2020-04-02 PROCEDURE — 6360000002 HC RX W HCPCS: Performed by: NURSE PRACTITIONER

## 2020-04-02 PROCEDURE — 96375 TX/PRO/DX INJ NEW DRUG ADDON: CPT

## 2020-04-02 PROCEDURE — 71045 X-RAY EXAM CHEST 1 VIEW: CPT

## 2020-04-02 PROCEDURE — 36415 COLL VENOUS BLD VENIPUNCTURE: CPT

## 2020-04-02 PROCEDURE — 96374 THER/PROPH/DIAG INJ IV PUSH: CPT

## 2020-04-02 RX ORDER — FENTANYL CITRATE 50 UG/ML
75 INJECTION, SOLUTION INTRAMUSCULAR; INTRAVENOUS ONCE
Status: COMPLETED | OUTPATIENT
Start: 2020-04-02 | End: 2020-04-02

## 2020-04-02 RX ORDER — KETOROLAC TROMETHAMINE 30 MG/ML
30 INJECTION, SOLUTION INTRAMUSCULAR; INTRAVENOUS ONCE
Status: COMPLETED | OUTPATIENT
Start: 2020-04-02 | End: 2020-04-02

## 2020-04-02 RX ADMIN — HYDROMORPHONE HYDROCHLORIDE 1 MG: 1 INJECTION, SOLUTION INTRAMUSCULAR; INTRAVENOUS; SUBCUTANEOUS at 13:46

## 2020-04-02 RX ADMIN — FENTANYL CITRATE 75 MCG: 50 INJECTION, SOLUTION INTRAMUSCULAR; INTRAVENOUS at 15:15

## 2020-04-02 RX ADMIN — KETOROLAC TROMETHAMINE 30 MG: 30 INJECTION, SOLUTION INTRAMUSCULAR at 14:17

## 2020-04-02 ASSESSMENT — PAIN SCALES - GENERAL
PAINLEVEL_OUTOF10: 10
PAINLEVEL_OUTOF10: 10
PAINLEVEL_OUTOF10: 9
PAINLEVEL_OUTOF10: 10

## 2020-04-02 ASSESSMENT — PAIN DESCRIPTION - PAIN TYPE: TYPE: ACUTE PAIN

## 2020-04-05 NOTE — ED PROVIDER NOTES
chambers clear. NECK: Supple. Normal ROM. No meningismus. No thyroid tenderness or swelling noted. CARDIOVASCULAR: RRR. No Murmer. No carotid bruits. PULMONARY/CHEST WALL: Effort normal. No tachypnea. Lungs clear to ausculation. ABDOMEN: Normal BS. Soft. Nondistended. No tenderness to palpation. No guarding. No hernias noted. No splenomegaly. Back: Spine is midline. No ecchymosis. No crepituson palpation. No obvious subluxation of vertebral column. No saddle anesthesia or evidence of cauda equina. Tenderness noted to thoracic and lumbar spine. /ANORECTAL: Not assessed  MUSKULOSKELETAL: Normal ROM. No acute deformities. No edema. No tenderness to palpate. SKIN: Warm and dry. NEUROLOGICAL:  GCS 15. CN II-XII grossly intact. Strength is 5/5 in all extremities and sensation is intact. PSYCHIATRIC: Normal affect, normal insight and judgement. Alert and oriented x 3.         DIAGNOSTIC RESULTS:     LABS:    Results for orders placed or performed during the hospital encounter of 04/02/20   CBC Auto Differential   Result Value Ref Range    WBC 18.4 (H) 4.0 - 11.0 K/uL    RBC 2.62 (L) 4.20 - 5.90 M/uL    Hemoglobin 8.8 (L) 13.5 - 17.5 g/dL    Hematocrit 24.1 (L) 40.5 - 52.5 %    MCV 91.9 80.0 - 100.0 fL    MCH 33.6 26.0 - 34.0 pg    MCHC 36.5 (H) 31.0 - 36.0 g/dL    RDW 23.3 (H) 12.4 - 15.4 %    Platelets 858 179 - 835 K/uL    MPV 8.6 5.0 - 10.5 fL    PLATELET SLIDE REVIEW Adequate     SLIDE REVIEW see below     Neutrophils % 68.0 %    Lymphocytes % 16.0 %    Monocytes % 9.0 %    Eosinophils % 2.0 %    Basophils % 0.0 %    Neutrophils Absolute 13.2 (H) 1.7 - 7.7 K/uL    Lymphocytes Absolute 3.1 1.0 - 5.1 K/uL    Monocytes Absolute 1.7 (H) 0.0 - 1.3 K/uL    Eosinophils Absolute 0.4 0.0 - 0.6 K/uL    Basophils Absolute 0.0 0.0 - 0.2 K/uL    Bands Relative 3 0 - 7 %    Atypical Lymphocytes Relative 1 0 - 6 %    Myelocyte Percent 1 (A) %    nRBC 2 (A) /100 WBC    nRBC 2 (A) /100 WBC    Anisocytosis 2+ (A) 112/59 (!) 113/59   Pulse: 53 56 56 56   Resp: 16 13 15 14   Temp:       TempSrc:       SpO2: 100% 96% 96% 97%   Weight:       Height:           Patient wasgiven the following medications:  Medications   HYDROmorphone (DILAUDID) injection 1 mg (1 mg Intravenous Given 4/2/20 1346)   ketorolac (TORADOL) injection 30 mg (30 mg Intravenous Given 4/2/20 1417)   fentaNYL (SUBLIMAZE) injection 75 mcg (75 mcg Intravenous Given 4/2/20 1515)         Patient was evaluated independently by myself with the attending physician available for consultation. patient presented to the ER with complaints of pain all over from a fall however pain likely more related to sickle cell. Reticulocyte count was elevated. Labs otherwise clinically unremarkable. Patient radiographic imaging was negative. Patient was given medications for pain relief. Patient has been seen recently or his sickle cell pain. Patient states he normally goes to [facility] for his sickle cell care. Patient requested script for narcotic pain medicine however he already has active prescription. Patient discharged home in good condition. Patient laboratory studies, radiographic imaging, and assessment were all discussed with the patient and/orpatient family. There was shared decision-making between myself as well as the patient and/or their surrogate and we are all in agreement with discharge home. There was an opportunity for questions and all questions were answered tothe best of my ability and to the satisfaction of the patient and/or patient family. FINAL IMPRESSION:      1. Sickle cell pain crisis ([address]    2.  Fall, initial encounter          DISPOSITION/PLAN:   DISPOSITION Decision To Discharge      PATIENT REFERRED TO:  Nitin Moralez MD  [address] [place] [address]  [phone]    Call   For follow up      DISCHARGE MEDICATIONS:  Discharge Medication List as of 4/2/2020  3:44 PM                     (Please note thatportions of this note were completed with a voice recognition program.  Efforts were made to edit the dictations, but occasionally words are mis-transcribed.)    CARMELITA SuarezC (electronicallysigned)        CARMELITA Suarez CNP  04/05/20 2951

## 2020-07-07 ENCOUNTER — HOSPITAL ENCOUNTER (OUTPATIENT)
Dept: GENERAL RADIOLOGY | Age: 20
Discharge: HOME | End: 2020-07-07
Payer: COMMERCIAL

## 2020-07-07 ENCOUNTER — HOSPITAL ENCOUNTER (OUTPATIENT)
Age: 20
Discharge: HOME | End: 2020-07-07
Payer: COMMERCIAL

## 2020-07-07 PROCEDURE — 70100 X-RAY EXAM OF JAW <4VIEWS: CPT

## 2020-07-22 ENCOUNTER — HOSPITAL ENCOUNTER (EMERGENCY)
Age: 20
Discharge: HOME | End: 2020-07-22
Attending: EMERGENCY MEDICINE
Payer: COMMERCIAL

## 2020-07-22 VITALS
HEIGHT: 68 IN | BODY MASS INDEX: 26.98 KG/M2 | OXYGEN SATURATION: 93 % | TEMPERATURE: 98.8 F | WEIGHT: 178 LBS | DIASTOLIC BLOOD PRESSURE: 58 MMHG | RESPIRATION RATE: 16 BRPM | HEART RATE: 72 BPM | SYSTOLIC BLOOD PRESSURE: 111 MMHG

## 2020-07-22 LAB
A/G RATIO: 2 (ref 1.1–2.2)
ALBUMIN SERPL-MCNC: 4.7 G/DL (ref 3.4–5)
ALP BLD-CCNC: 58 U/L (ref 40–129)
ALT SERPL-CCNC: 17 U/L (ref 10–40)
ANION GAP SERPL CALCULATED.3IONS-SCNC: 11 MMOL/L (ref 3–16)
ANISOCYTOSIS: ABNORMAL
AST SERPL-CCNC: 29 U/L (ref 15–37)
BANDED NEUTROPHILS RELATIVE PERCENT: 1 % (ref 0–7)
BASOPHILS ABSOLUTE: 0 K/UL (ref 0–0.2)
BASOPHILS RELATIVE PERCENT: 0 %
BILIRUB SERPL-MCNC: 5.9 MG/DL (ref 0–1)
BUN BLDV-MCNC: 7 MG/DL (ref 7–20)
CALCIUM SERPL-MCNC: 9.6 MG/DL (ref 8.3–10.6)
CHLORIDE BLD-SCNC: 103 MMOL/L (ref 99–110)
CO2: 24 MMOL/L (ref 21–32)
CREAT SERPL-MCNC: 0.7 MG/DL (ref 0.9–1.3)
EOSINOPHILS ABSOLUTE: 0.1 K/UL (ref 0–0.6)
EOSINOPHILS RELATIVE PERCENT: 1 %
GFR AFRICAN AMERICAN: >60
GFR NON-AFRICAN AMERICAN: >60
GLOBULIN: 2.3 G/DL
GLUCOSE BLD-MCNC: 93 MG/DL (ref 70–99)
HCT VFR BLD CALC: 23.8 % (ref 40.5–52.5)
HEMOGLOBIN: 8.5 G/DL (ref 13.5–17.5)
IMMATURE RETIC FRACT: 0.63 (ref 0.21–0.37)
LYMPHOCYTES ABSOLUTE: 3.4 K/UL (ref 1–5.1)
LYMPHOCYTES RELATIVE PERCENT: 27 %
MACROCYTES: ABNORMAL
MCH RBC QN AUTO: 33.4 PG (ref 26–34)
MCHC RBC AUTO-ENTMCNC: 35.6 G/DL (ref 31–36)
MCV RBC AUTO: 93.7 FL (ref 80–100)
MICROCYTES: ABNORMAL
MONOCYTES ABSOLUTE: 0.9 K/UL (ref 0–1.3)
MONOCYTES RELATIVE PERCENT: 7 %
NEUTROPHILS ABSOLUTE: 8.1 K/UL (ref 1.7–7.7)
NEUTROPHILS RELATIVE PERCENT: 64 %
NUCLEATED RED BLOOD CELLS: 1 /100 WBC
NUCLEATED RED BLOOD CELLS: 1 /100 WBC
PDW BLD-RTO: 23.8 % (ref 12.4–15.4)
PLATELET # BLD: 370 K/UL (ref 135–450)
PLATELET SLIDE REVIEW: ADEQUATE
PMV BLD AUTO: 9 FL (ref 5–10.5)
POIKILOCYTES: ABNORMAL
POLYCHROMASIA: ABNORMAL
POTASSIUM REFLEX MAGNESIUM: 4.6 MMOL/L (ref 3.5–5.1)
RBC # BLD: 2.54 M/UL (ref 4.2–5.9)
RETICULOCYTE ABSOLUTE COUNT: 0.29 M/UL
RETICULOCYTE COUNT PCT: 11.37 % (ref 0.5–2.18)
SCHISTOCYTES: ABNORMAL
SICKLE CELLS: ABNORMAL
SLIDE REVIEW: ABNORMAL
SODIUM BLD-SCNC: 138 MMOL/L (ref 136–145)
TARGET CELLS: ABNORMAL
TOTAL PROTEIN: 7 G/DL (ref 6.4–8.2)
WBC # BLD: 12.5 K/UL (ref 4–11)

## 2020-07-22 PROCEDURE — 85025 COMPLETE CBC W/AUTO DIFF WBC: CPT

## 2020-07-22 PROCEDURE — 80053 COMPREHEN METABOLIC PANEL: CPT

## 2020-07-22 PROCEDURE — 85045 AUTOMATED RETICULOCYTE COUNT: CPT

## 2020-07-22 PROCEDURE — 6360000002 HC RX W HCPCS: Performed by: EMERGENCY MEDICINE

## 2020-07-22 PROCEDURE — 99284 EMERGENCY DEPT VISIT MOD MDM: CPT

## 2020-07-22 PROCEDURE — 2580000003 HC RX 258: Performed by: EMERGENCY MEDICINE

## 2020-07-22 PROCEDURE — 96374 THER/PROPH/DIAG INJ IV PUSH: CPT

## 2020-07-22 PROCEDURE — 96375 TX/PRO/DX INJ NEW DRUG ADDON: CPT

## 2020-07-22 PROCEDURE — 96376 TX/PRO/DX INJ SAME DRUG ADON: CPT

## 2020-07-22 RX ORDER — METHADONE HYDROCHLORIDE 5 MG/1
5 TABLET ORAL EVERY 4 HOURS PRN
Status: ON HOLD | COMMUNITY
End: 2020-12-09 | Stop reason: HOSPADM

## 2020-07-22 RX ORDER — 0.9 % SODIUM CHLORIDE 0.9 %
1000 INTRAVENOUS SOLUTION INTRAVENOUS ONCE
Status: COMPLETED | OUTPATIENT
Start: 2020-07-22 | End: 2020-07-22

## 2020-07-22 RX ORDER — HYDROMORPHONE HYDROCHLORIDE 2 MG/1
2 TABLET ORAL EVERY 4 HOURS PRN
Qty: 15 TABLET | Refills: 0 | Status: ON HOLD | OUTPATIENT
Start: 2020-07-22 | End: 2020-07-30 | Stop reason: HOSPADM

## 2020-07-22 RX ORDER — HYDROMORPHONE HCL 110MG/55ML
1.5 PATIENT CONTROLLED ANALGESIA SYRINGE INTRAVENOUS ONCE
Status: COMPLETED | OUTPATIENT
Start: 2020-07-22 | End: 2020-07-22

## 2020-07-22 RX ORDER — FENTANYL CITRATE 50 UG/ML
25 INJECTION, SOLUTION INTRAMUSCULAR; INTRAVENOUS ONCE
Status: COMPLETED | OUTPATIENT
Start: 2020-07-22 | End: 2020-07-22

## 2020-07-22 RX ORDER — ONDANSETRON 2 MG/ML
4 INJECTION INTRAMUSCULAR; INTRAVENOUS ONCE
Status: COMPLETED | OUTPATIENT
Start: 2020-07-22 | End: 2020-07-22

## 2020-07-22 RX ORDER — HYDROMORPHONE HYDROCHLORIDE 2 MG/1
TABLET ORAL
COMMUNITY
Start: 2020-07-08 | End: 2020-07-22

## 2020-07-22 RX ADMIN — ONDANSETRON 4 MG: 2 INJECTION INTRAMUSCULAR; INTRAVENOUS at 21:11

## 2020-07-22 RX ADMIN — HYDROMORPHONE HYDROCHLORIDE 0.5 MG: 1 INJECTION, SOLUTION INTRAMUSCULAR; INTRAVENOUS; SUBCUTANEOUS at 22:58

## 2020-07-22 RX ADMIN — FENTANYL CITRATE 25 MCG: 50 INJECTION, SOLUTION INTRAMUSCULAR; INTRAVENOUS at 21:51

## 2020-07-22 RX ADMIN — SODIUM CHLORIDE 1000 ML: 9 INJECTION, SOLUTION INTRAVENOUS at 21:50

## 2020-07-22 RX ADMIN — HYDROMORPHONE HYDROCHLORIDE 1.5 MG: 2 INJECTION, SOLUTION INTRAMUSCULAR; INTRAVENOUS; SUBCUTANEOUS at 21:11

## 2020-07-22 ASSESSMENT — PAIN DESCRIPTION - ORIENTATION: ORIENTATION: RIGHT;LEFT;LOWER

## 2020-07-22 ASSESSMENT — PAIN SCALES - GENERAL
PAINLEVEL_OUTOF10: 7
PAINLEVEL_OUTOF10: 9

## 2020-07-22 ASSESSMENT — PAIN DESCRIPTION - LOCATION: LOCATION: LEG

## 2020-07-22 ASSESSMENT — PAIN - FUNCTIONAL ASSESSMENT: PAIN_FUNCTIONAL_ASSESSMENT: 0-10

## 2020-07-22 ASSESSMENT — PAIN DESCRIPTION - PAIN TYPE: TYPE: ACUTE PAIN

## 2020-07-23 NOTE — ED NOTES
All discharge paperwork and follow-up instructions reviewed with pt. Pt verbalized understanding. Pt ambulatory upon discharge in stable condition. Pt taking an Uber/Lyft home.        Camilo Tello RN  07/22/20 8608

## 2020-07-23 NOTE — ED PROVIDER NOTES
CHIEF COMPLAINT  Sickle Cell Pain Crisis (states last crisis about 2 weeks ago. states pain from knees down. states has not taken pain medication for over a week due to being out)      Karina0 Sean Pierce is a 23 y.o. male who presents to the ED with pain in his legs mostly from the knees down to his feet. . Stated onset of symptoms was earlier today. .  The location is from his knees down. The patient describes the symptoms as significant achiness and it feels like his usual sickle cell pain but he says usually a sickle cell pain in his back or hips and not usually in his legs. He is usually takes methadone and hydromorphone as an outpatient but he says he ran out of this a couple of days ago. His last sickle cell crisis was about 2 weeks ago. Patient also has a history of splenectomy. . As far as alleviating or aggravating factors the patient says nothing makes the pain any better or worse. Denies any chest pain or shortness of breath or any urinary issues or any cough or fevers or chills or nausea or vomiting. No back pain or neck pain. No other complaints, modifying factors or associated symptoms. I have reviewed the following from the nursing documentation. Past Medical History:   Diagnosis Date    Asthma     Sickle cell anemia (HCC)      Past Surgical History:   Procedure Laterality Date    SPLENECTOMY       No family history on file.   Social History     Socioeconomic History    Marital status: Single     Spouse name: Not on file    Number of children: Not on file    Years of education: Not on file    Highest education level: Not on file   Occupational History    Not on file   Social Needs    Financial resource strain: Not on file    Food insecurity     Worry: Not on file     Inability: Not on file    Transportation needs     Medical: Not on file     Non-medical: Not on file   Tobacco Use    Smoking status: Never Smoker    Smokeless tobacco: Never Used Substance and Sexual Activity    Alcohol use: Not Currently    Drug use: Never    Sexual activity: Not on file   Lifestyle    Physical activity     Days per week: Not on file     Minutes per session: Not on file    Stress: Not on file   Relationships    Social connections     Talks on phone: Not on file     Gets together: Not on file     Attends Episcopalian service: Not on file     Active member of club or organization: Not on file     Attends meetings of clubs or organizations: Not on file     Relationship status: Not on file    Intimate partner violence     Fear of current or ex partner: Not on file     Emotionally abused: Not on file     Physically abused: Not on file     Forced sexual activity: Not on file   Other Topics Concern    Not on file   Social History Narrative    Not on file     No current facility-administered medications for this encounter. Current Outpatient Medications   Medication Sig Dispense Refill    hydroxyurea (HYDREA) 500 MG chemo capsule Take by mouth daily       Allergies   Allergen Reactions    Morphine Shortness Of Breath       REVIEW OF SYSTEMS  10 systems reviewed, pertinent positives per HPI otherwise noted to be negative. PHYSICAL EXAM  There were no vitals taken for this visit. GENERAL APPEARANCE: Awake and alert. Cooperative. No acute distress. HEAD: Normocephalic. Atraumatic. EYES: PERRL. EOM's grossly intact. ENT: Mucous membranes are moist.   NECK: Supple. HEART: RRR. CHEST/LUNGS: Chest atraumatic, nontender, respirations unlabored. CTAB. Good air exchange. Speaking comfortably in full sentences. BACK: No midline spinal tenderness or step-off. ABDOMEN: Soft. Non-distended. Non-tender. No guarding or rebound. Normal bowel sounds. EXTREMITIES: No peripheral edema. Moves all extremities equally. All extremities neurovascularly intact. RECTAL/: Deferred  SKIN: Warm and dry. No acute rashes. NEUROLOGICAL: Alert and oriented.  CN 2-12 intact, No

## 2020-07-25 ENCOUNTER — APPOINTMENT (OUTPATIENT)
Dept: GENERAL RADIOLOGY | Age: 20
DRG: 812 | End: 2020-07-25
Payer: COMMERCIAL

## 2020-07-25 ENCOUNTER — HOSPITAL ENCOUNTER (INPATIENT)
Age: 20
LOS: 2 days | Discharge: HOME | DRG: 812 | End: 2020-07-30
Attending: EMERGENCY MEDICINE | Admitting: INTERNAL MEDICINE
Payer: COMMERCIAL

## 2020-07-25 PROBLEM — D57.00 SICKLE CELL PAIN CRISIS (HCC): Status: ACTIVE | Noted: 2020-07-25

## 2020-07-25 PROBLEM — J45.909 ASTHMA: Chronic | Status: ACTIVE | Noted: 2020-07-25

## 2020-07-25 LAB
ALBUMIN SERPL-MCNC: 4.4 G/DL (ref 3.4–5)
ALP BLD-CCNC: 59 U/L (ref 40–129)
ALT SERPL-CCNC: 13 U/L (ref 10–40)
ANION GAP SERPL CALCULATED.3IONS-SCNC: 10 MMOL/L (ref 3–16)
ANISOCYTOSIS: ABNORMAL
AST SERPL-CCNC: 34 U/L (ref 15–37)
BANDED NEUTROPHILS RELATIVE PERCENT: 4 % (ref 0–7)
BASE EXCESS VENOUS: -5.1 MMOL/L (ref -3–3)
BASOPHILS ABSOLUTE: 0.2 K/UL (ref 0–0.2)
BASOPHILS RELATIVE PERCENT: 1 %
BILIRUB SERPL-MCNC: 5.2 MG/DL (ref 0–1)
BILIRUBIN DIRECT: 0.3 MG/DL (ref 0–0.3)
BILIRUBIN, INDIRECT: 4.9 MG/DL (ref 0–1)
BUN BLDV-MCNC: 14 MG/DL (ref 7–20)
CALCIUM SERPL-MCNC: 9.6 MG/DL (ref 8.3–10.6)
CARBOXYHEMOGLOBIN: 4.1 % (ref 0–1.5)
CHLORIDE BLD-SCNC: 102 MMOL/L (ref 99–110)
CO2: 24 MMOL/L (ref 21–32)
CREAT SERPL-MCNC: 0.8 MG/DL (ref 0.9–1.3)
EOSINOPHILS ABSOLUTE: 0 K/UL (ref 0–0.6)
EOSINOPHILS RELATIVE PERCENT: 0 %
GFR AFRICAN AMERICAN: >60
GFR NON-AFRICAN AMERICAN: >60
GLUCOSE BLD-MCNC: 90 MG/DL (ref 70–99)
HCO3 VENOUS: 20.5 MMOL/L (ref 23–29)
HCT VFR BLD CALC: 22.7 % (ref 40.5–52.5)
HEMATOLOGY PATH CONSULT: NO
HEMOGLOBIN: 8.2 G/DL (ref 13.5–17.5)
IMMATURE RETIC FRACT: 0.72 (ref 0.21–0.37)
LYMPHOCYTES ABSOLUTE: 3.3 K/UL (ref 1–5.1)
LYMPHOCYTES RELATIVE PERCENT: 20 %
MACROCYTES: ABNORMAL
MCH RBC QN AUTO: 34.6 PG (ref 26–34)
MCHC RBC AUTO-ENTMCNC: 35.9 G/DL (ref 31–36)
MCV RBC AUTO: 96.3 FL (ref 80–100)
METHEMOGLOBIN VENOUS: 1.2 %
MONOCYTES ABSOLUTE: 1.5 K/UL (ref 0–1.3)
MONOCYTES RELATIVE PERCENT: 9 %
NEUTROPHILS ABSOLUTE: 11.6 K/UL (ref 1.7–7.7)
NEUTROPHILS RELATIVE PERCENT: 66 %
NUCLEATED RED BLOOD CELLS: 4 /100 WBC
NUCLEATED RED BLOOD CELLS: 4 /100 WBC
O2 CONTENT, VEN: 9 VOL %
O2 SAT, VEN: 76 %
O2 THERAPY: ABNORMAL
OVALOCYTES: ABNORMAL
PCO2, VEN: 39.9 MMHG (ref 40–50)
PDW BLD-RTO: 23.2 % (ref 12.4–15.4)
PH VENOUS: 7.33 (ref 7.35–7.45)
PLATELET # BLD: 400 K/UL (ref 135–450)
PLATELET SLIDE REVIEW: ADEQUATE
PMV BLD AUTO: 8.9 FL (ref 5–10.5)
PO2, VEN: 57.7 MMHG (ref 25–40)
POIKILOCYTES: ABNORMAL
POLYCHROMASIA: ABNORMAL
POTASSIUM REFLEX MAGNESIUM: 4.3 MMOL/L (ref 3.5–5.1)
PROCALCITONIN: 0.27 NG/ML (ref 0–0.15)
RBC # BLD: 2.36 M/UL (ref 4.2–5.9)
RETICULOCYTE ABSOLUTE COUNT: 0.26 M/UL
RETICULOCYTE COUNT PCT: 10.83 % (ref 0.5–2.18)
SICKLE CELLS: ABNORMAL
SODIUM BLD-SCNC: 136 MMOL/L (ref 136–145)
TARGET CELLS: ABNORMAL
TCO2 CALC VENOUS: 22 MMOL/L
TEAR DROP CELLS: ABNORMAL
TOTAL PROTEIN: 7.1 G/DL (ref 6.4–8.2)
WBC # BLD: 16.6 K/UL (ref 4–11)

## 2020-07-25 PROCEDURE — 93005 ELECTROCARDIOGRAM TRACING: CPT

## 2020-07-25 PROCEDURE — 6360000002 HC RX W HCPCS: Performed by: INTERNAL MEDICINE

## 2020-07-25 PROCEDURE — 71045 X-RAY EXAM CHEST 1 VIEW: CPT

## 2020-07-25 PROCEDURE — 6360000002 HC RX W HCPCS: Performed by: EMERGENCY MEDICINE

## 2020-07-25 PROCEDURE — G0378 HOSPITAL OBSERVATION PER HR: HCPCS

## 2020-07-25 PROCEDURE — 2580000003 HC RX 258: Performed by: EMERGENCY MEDICINE

## 2020-07-25 PROCEDURE — 6370000000 HC RX 637 (ALT 250 FOR IP): Performed by: INTERNAL MEDICINE

## 2020-07-25 PROCEDURE — 82803 BLOOD GASES ANY COMBINATION: CPT

## 2020-07-25 PROCEDURE — 96375 TX/PRO/DX INJ NEW DRUG ADDON: CPT

## 2020-07-25 PROCEDURE — 2580000003 HC RX 258: Performed by: INTERNAL MEDICINE

## 2020-07-25 PROCEDURE — 2580000003 HC RX 258

## 2020-07-25 PROCEDURE — 84145 PROCALCITONIN (PCT): CPT

## 2020-07-25 PROCEDURE — 80048 BASIC METABOLIC PNL TOTAL CA: CPT

## 2020-07-25 PROCEDURE — 99285 EMERGENCY DEPT VISIT HI MDM: CPT

## 2020-07-25 PROCEDURE — 96376 TX/PRO/DX INJ SAME DRUG ADON: CPT

## 2020-07-25 PROCEDURE — 80076 HEPATIC FUNCTION PANEL: CPT

## 2020-07-25 PROCEDURE — 96365 THER/PROPH/DIAG IV INF INIT: CPT

## 2020-07-25 PROCEDURE — 96367 TX/PROPH/DG ADDL SEQ IV INF: CPT

## 2020-07-25 PROCEDURE — 96374 THER/PROPH/DIAG INJ IV PUSH: CPT

## 2020-07-25 PROCEDURE — 87040 BLOOD CULTURE FOR BACTERIA: CPT

## 2020-07-25 PROCEDURE — 85045 AUTOMATED RETICULOCYTE COUNT: CPT

## 2020-07-25 PROCEDURE — 85025 COMPLETE CBC W/AUTO DIFF WBC: CPT

## 2020-07-25 RX ORDER — ACETAMINOPHEN 160 MG/5ML
650 SOLUTION ORAL EVERY 4 HOURS PRN
Status: DISCONTINUED | OUTPATIENT
Start: 2020-07-25 | End: 2020-07-28

## 2020-07-25 RX ORDER — ACETAMINOPHEN 650 MG/1
650 SUPPOSITORY RECTAL EVERY 4 HOURS PRN
Status: DISCONTINUED | OUTPATIENT
Start: 2020-07-25 | End: 2020-07-30 | Stop reason: HOSPADM

## 2020-07-25 RX ORDER — POTASSIUM CHLORIDE 7.45 MG/ML
10 INJECTION INTRAVENOUS PRN
Status: DISCONTINUED | OUTPATIENT
Start: 2020-07-25 | End: 2020-07-30 | Stop reason: HOSPADM

## 2020-07-25 RX ORDER — SODIUM CHLORIDE 9 MG/ML
INJECTION, SOLUTION INTRAVENOUS
Status: COMPLETED
Start: 2020-07-25 | End: 2020-07-25

## 2020-07-25 RX ORDER — ACETAMINOPHEN 325 MG/1
650 TABLET ORAL EVERY 4 HOURS PRN
Status: DISCONTINUED | OUTPATIENT
Start: 2020-07-25 | End: 2020-07-30 | Stop reason: HOSPADM

## 2020-07-25 RX ORDER — PROMETHAZINE HYDROCHLORIDE 6.25 MG/5ML
12.5 SYRUP ORAL EVERY 4 HOURS PRN
Status: DISCONTINUED | OUTPATIENT
Start: 2020-07-25 | End: 2020-07-25

## 2020-07-25 RX ORDER — SODIUM CHLORIDE, SODIUM LACTATE, POTASSIUM CHLORIDE, CALCIUM CHLORIDE 600; 310; 30; 20 MG/100ML; MG/100ML; MG/100ML; MG/100ML
INJECTION, SOLUTION INTRAVENOUS CONTINUOUS
Status: DISCONTINUED | OUTPATIENT
Start: 2020-07-25 | End: 2020-07-29

## 2020-07-25 RX ORDER — HYDROXYUREA 500 MG/1
CAPSULE ORAL DAILY
Status: ON HOLD | COMMUNITY
End: 2020-08-26 | Stop reason: SDUPTHER

## 2020-07-25 RX ORDER — ONDANSETRON 4 MG/1
4 TABLET, ORALLY DISINTEGRATING ORAL EVERY 4 HOURS PRN
Status: DISCONTINUED | OUTPATIENT
Start: 2020-07-25 | End: 2020-07-30 | Stop reason: HOSPADM

## 2020-07-25 RX ORDER — PROMETHAZINE HYDROCHLORIDE 25 MG/ML
12.5 INJECTION, SOLUTION INTRAMUSCULAR; INTRAVENOUS EVERY 4 HOURS PRN
Status: DISCONTINUED | OUTPATIENT
Start: 2020-07-25 | End: 2020-07-30 | Stop reason: HOSPADM

## 2020-07-25 RX ORDER — FOLIC ACID 1 MG/1
1 TABLET ORAL DAILY
Status: DISCONTINUED | OUTPATIENT
Start: 2020-07-25 | End: 2020-07-30 | Stop reason: HOSPADM

## 2020-07-25 RX ORDER — FOLIC ACID 1 MG/1
1 TABLET ORAL DAILY
Status: ON HOLD | COMMUNITY
End: 2020-08-26 | Stop reason: SDUPTHER

## 2020-07-25 RX ORDER — ONDANSETRON 2 MG/ML
4 INJECTION INTRAMUSCULAR; INTRAVENOUS EVERY 4 HOURS PRN
Status: DISCONTINUED | OUTPATIENT
Start: 2020-07-25 | End: 2020-07-30 | Stop reason: HOSPADM

## 2020-07-25 RX ORDER — POTASSIUM CHLORIDE 20 MEQ/1
40 TABLET, EXTENDED RELEASE ORAL PRN
Status: DISCONTINUED | OUTPATIENT
Start: 2020-07-25 | End: 2020-07-30 | Stop reason: HOSPADM

## 2020-07-25 RX ORDER — MAGNESIUM SULFATE 1 G/100ML
1 INJECTION INTRAVENOUS PRN
Status: DISCONTINUED | OUTPATIENT
Start: 2020-07-25 | End: 2020-07-30 | Stop reason: HOSPADM

## 2020-07-25 RX ORDER — 0.9 % SODIUM CHLORIDE 0.9 %
1000 INTRAVENOUS SOLUTION INTRAVENOUS ONCE
Status: COMPLETED | OUTPATIENT
Start: 2020-07-25 | End: 2020-07-25

## 2020-07-25 RX ORDER — HYDROXYUREA 500 MG/1
2000 CAPSULE ORAL DAILY
Status: DISCONTINUED | OUTPATIENT
Start: 2020-07-25 | End: 2020-07-30 | Stop reason: HOSPADM

## 2020-07-25 RX ORDER — ALBUTEROL SULFATE 2.5 MG/3ML
2.5 SOLUTION RESPIRATORY (INHALATION) EVERY 6 HOURS PRN
Status: DISCONTINUED | OUTPATIENT
Start: 2020-07-25 | End: 2020-07-30 | Stop reason: HOSPADM

## 2020-07-25 RX ORDER — HYDROMORPHONE HYDROCHLORIDE 2 MG/1
2 TABLET ORAL EVERY 4 HOURS PRN
Status: DISCONTINUED | OUTPATIENT
Start: 2020-07-25 | End: 2020-07-28

## 2020-07-25 RX ORDER — PROMETHAZINE HYDROCHLORIDE 25 MG/1
12.5 TABLET ORAL EVERY 4 HOURS PRN
Status: DISCONTINUED | OUTPATIENT
Start: 2020-07-25 | End: 2020-07-30 | Stop reason: HOSPADM

## 2020-07-25 RX ORDER — SODIUM CHLORIDE 0.9 % (FLUSH) 0.9 %
10 SYRINGE (ML) INJECTION PRN
Status: DISCONTINUED | OUTPATIENT
Start: 2020-07-25 | End: 2020-07-30 | Stop reason: HOSPADM

## 2020-07-25 RX ADMIN — SODIUM CHLORIDE, POTASSIUM CHLORIDE, SODIUM LACTATE AND CALCIUM CHLORIDE: 600; 310; 30; 20 INJECTION, SOLUTION INTRAVENOUS at 18:45

## 2020-07-25 RX ADMIN — HYDROMORPHONE HYDROCHLORIDE 1 MG: 1 INJECTION, SOLUTION INTRAMUSCULAR; INTRAVENOUS; SUBCUTANEOUS at 05:01

## 2020-07-25 RX ADMIN — SODIUM CHLORIDE, POTASSIUM CHLORIDE, SODIUM LACTATE AND CALCIUM CHLORIDE: 600; 310; 30; 20 INJECTION, SOLUTION INTRAVENOUS at 08:52

## 2020-07-25 RX ADMIN — HYDROMORPHONE HYDROCHLORIDE 2 MG: 2 TABLET ORAL at 08:31

## 2020-07-25 RX ADMIN — AZITHROMYCIN 500 MG: 500 INJECTION, POWDER, LYOPHILIZED, FOR SOLUTION INTRAVENOUS at 05:56

## 2020-07-25 RX ADMIN — HYDROMORPHONE HYDROCHLORIDE 1 MG: 1 INJECTION, SOLUTION INTRAMUSCULAR; INTRAVENOUS; SUBCUTANEOUS at 22:40

## 2020-07-25 RX ADMIN — HYDROMORPHONE HYDROCHLORIDE 2 MG: 2 TABLET ORAL at 20:57

## 2020-07-25 RX ADMIN — HYDROMORPHONE HYDROCHLORIDE 1 MG: 1 INJECTION, SOLUTION INTRAMUSCULAR; INTRAVENOUS; SUBCUTANEOUS at 12:04

## 2020-07-25 RX ADMIN — HYDROXYUREA 2000 MG: 500 CAPSULE ORAL at 08:43

## 2020-07-25 RX ADMIN — HYDROMORPHONE HYDROCHLORIDE 2 MG: 2 TABLET ORAL at 16:50

## 2020-07-25 RX ADMIN — HYDROMORPHONE HYDROCHLORIDE 1 MG: 1 INJECTION, SOLUTION INTRAMUSCULAR; INTRAVENOUS; SUBCUTANEOUS at 17:58

## 2020-07-25 RX ADMIN — SODIUM CHLORIDE, POTASSIUM CHLORIDE, SODIUM LACTATE AND CALCIUM CHLORIDE: 600; 310; 30; 20 INJECTION, SOLUTION INTRAVENOUS at 05:29

## 2020-07-25 RX ADMIN — CEFTRIAXONE SODIUM 1 G: 1 INJECTION, POWDER, FOR SOLUTION INTRAMUSCULAR; INTRAVENOUS at 05:24

## 2020-07-25 RX ADMIN — FOLIC ACID 1 MG: 1 TABLET ORAL at 08:30

## 2020-07-25 RX ADMIN — HYDROMORPHONE HYDROCHLORIDE 1 MG: 1 INJECTION, SOLUTION INTRAMUSCULAR; INTRAVENOUS; SUBCUTANEOUS at 04:00

## 2020-07-25 RX ADMIN — SODIUM CHLORIDE 1000 ML: 9 INJECTION, SOLUTION INTRAVENOUS at 03:24

## 2020-07-25 RX ADMIN — SODIUM CHLORIDE 500 ML: 9 INJECTION, SOLUTION INTRAVENOUS at 05:27

## 2020-07-25 ASSESSMENT — PAIN SCALES - GENERAL
PAINLEVEL_OUTOF10: 8
PAINLEVEL_OUTOF10: 9
PAINLEVEL_OUTOF10: 8
PAINLEVEL_OUTOF10: 7
PAINLEVEL_OUTOF10: 7
PAINLEVEL_OUTOF10: 9
PAINLEVEL_OUTOF10: 8
PAINLEVEL_OUTOF10: 7
PAINLEVEL_OUTOF10: 9

## 2020-07-25 ASSESSMENT — PAIN DESCRIPTION - ORIENTATION
ORIENTATION: RIGHT;LEFT;LOWER
ORIENTATION: RIGHT;LEFT
ORIENTATION: RIGHT;LEFT

## 2020-07-25 ASSESSMENT — ENCOUNTER SYMPTOMS
ABDOMINAL PAIN: 0
BACK PAIN: 0
NAUSEA: 0
VOMITING: 0
WHEEZING: 0
COUGH: 0
SHORTNESS OF BREATH: 1
RHINORRHEA: 0
DIARRHEA: 0
PHOTOPHOBIA: 0

## 2020-07-25 ASSESSMENT — PAIN DESCRIPTION - LOCATION
LOCATION: LEG

## 2020-07-25 ASSESSMENT — PAIN DESCRIPTION - DESCRIPTORS
DESCRIPTORS: SHARP
DESCRIPTORS: SHARP

## 2020-07-25 ASSESSMENT — PAIN DESCRIPTION - PAIN TYPE
TYPE: CHRONIC PAIN
TYPE: ACUTE PAIN
TYPE: ACUTE PAIN

## 2020-07-25 NOTE — PROGRESS NOTES
Called into pt room for beeping IV and complaints of pain. Upon entering room, pt is resting and appearing comfortable with eyes closed. When awoken, pt states pain is a 7/10 and requesting pain medication. Will continue to monitor and medicate according to [facility]

## 2020-07-25 NOTE — LETTER
[facility] - Med Surg/Ortho  [address] [facility]  Phone: [phone]             July 28, 2020    Patient: [facility]   YOB: 2000   Date of Visit: 7/25/2020       To Whom It May Concern:    [facility] was admitted and being treated in our facility  beginning 7/25/2020 . Please excuse him from work at this time.       Sincerely,       Taya See RN         Signature:__________________________________

## 2020-07-25 NOTE — PROGRESS NOTES
Message sent to MD.    Pt complaining of midsternal chest pain. Pt states pain decreases after pain medication. Stat EKG ordered per protocol. Pt is here for sickle cell crisis. Any other recommendations?     Waiting on response

## 2020-07-25 NOTE — ED PROVIDER NOTES
Emergency Department Provider Note  Location: [address] [place] [address]  ED  7/25/2020     Patient Identification  Te Chris is a 23 y.o. male    Chief Complaint  Sickle Cell Pain Crisis (joselyn lower leg pain for a couple days. Pt seen here a couple days ago. )          HPI  (History provided by patient)  58-year-old male with sickle cell disease, asthma, status post splenectomy, reports continued sickle cell pains and shortness of breath. Seen and evaluated 3 days ago in the emergency department received IV fluids, pain medications in the left. Reports he still having the same pains which are mainly in but bilateral lower extremities which is typical of his usual sickle cell crisis pain. He also states that he has had shortness of breath which is not responded to his asthma inhalers. It is more than it it is normally at baseline. He denies any fevers cough chest pain or sputum production. No history of acute chest.      I have reviewed the following nursing documentation:  Allergies: Allergies   Allergen Reactions    Morphine Shortness Of Breath       Past medical history:  has a past medical history of Asthma, Enlarged heart, and Sickle cell anemia ([address] Past surgical history:  has a past surgical history that includes Splenectomy. Home medications:   Prior to Admission medications    Medication Sig Start Date End Date Taking? Authorizing Provider   folic acid (FOLVITE) 1 MG tablet Take 1 mg by mouth daily   Yes Historical Provider, MD   hydroxyurea (HYDREA) 500 MG chemo capsule Take by mouth daily Unsure of dose   Yes Historical Provider, MD   methadone (DOLOPHINE) 5 MG tablet Take 5 mg by mouth every 4 hours as needed for Pain. Yes Historical Provider, MD   HYDROmorphone (DILAUDID) 2 MG tablet Take 1 tablet by mouth every 4 hours as needed for Pain for up to 3 days. 7/22/20 7/25/20 Yes Kanika Turner DO       Social history:  reports that he has never smoked.  He has never used smokeless tobacco. He reports previous alcohol use. He reports that he does not use drugs. Family history:  History reviewed. No pertinent family history. ROS  Review of Systems   Constitutional: Negative for chills and fever. HENT: Negative for congestion and rhinorrhea. Eyes: Negative for photophobia and visual disturbance. Respiratory: Positive for shortness of breath. Negative for cough and wheezing. Cardiovascular: Negative for chest pain and palpitations. Gastrointestinal: Negative for abdominal pain, diarrhea, nausea and vomiting. Genitourinary: Negative for dysuria and hematuria. Musculoskeletal: Positive for arthralgias. Negative for back pain and neck pain. Skin: Negative for rash and wound. Neurological: Negative for syncope and weakness. Psychiatric/Behavioral: Negative for agitation and confusion. Exam  ED Triage Vitals   BP Temp Temp src Pulse Resp SpO2 Height Weight   -- -- -- -- -- -- -- --       Physical Exam  Vitals signs and nursing note reviewed. Constitutional:       General: He is not in acute distress. Appearance: He is well-developed. HENT:      Head: Normocephalic and atraumatic. Eyes:      Extraocular Movements: Extraocular movements intact. Pupils: Pupils are equal, round, and reactive to light. Neck:      Musculoskeletal: Normal range of motion and neck supple. Cardiovascular:      Rate and Rhythm: Normal rate and regular rhythm. Heart sounds: No murmur. Pulmonary:      Effort: Pulmonary effort is normal.      Breath sounds: Normal breath sounds. Abdominal:      General: There is no distension. Palpations: Abdomen is soft. Tenderness: There is no abdominal tenderness. Musculoskeletal: Normal range of motion. General: No tenderness or deformity. Skin:     General: Skin is warm. Findings: No rash. Neurological:      Mental Status: He is alert and oriented to person, place, and time.       Motor: No abnormal muscle tone. Coordination: Coordination normal.   Psychiatric:         Mood and Affect: Mood normal.         Behavior: Behavior normal.           ED Course    ED Medication Orders (From admission, onward)    Start Ordered     Status Ordering Provider    07/25/20 0430 07/25/20 0417  cefTRIAXone (ROCEPHIN) 1 g IVPB in 50 mL D5W minibag  ONCE      Acknowledged GUMARO MATOS    07/25/20 0430 07/25/20 0417  azithromycin (ZITHROMAX) 500 mg in D5W 250ml addavial  ONCE      Acknowledged GUMARO MATOS    07/25/20 0415 07/25/20 0412  HYDROmorphone (DILAUDID) injection 1 mg  ONCE      Acknowledged GUMARO MATOS    07/25/20 0345 07/25/20 0342  HYDROmorphone (DILAUDID) injection 1 mg  ONCE      Last MAR action:  Given - by Glenroy Mora on 07/25/20 at [facility], GUMARO HERNADEZ    07/25/20 0315 07/25/20 0307  0.9 % sodium chloride bolus  ONCE      Last MAR action:  New Bag - by Glenroy Mora on 07/25/20 at [facility]              Radiology  Xr Chest Portable    Result Date: 7/25/2020  EXAMINATION: ONE XRAY VIEW OF THE CHEST 7/25/2020 3:16 am COMPARISON: 04/02/2020 HISTORY: ORDERING SYSTEM PROVIDED HISTORY: sickle cell pain TECHNOLOGIST PROVIDED HISTORY: Reason for exam:->sickle cell pain Reason for Exam: sickle cell pain Type of Exam: Unknown FINDINGS: Patient is rotated. Stable cardiomegaly. Subtle right infrahilar airspace opacity within the medial right lung base. No other focal consolidation, pleural effusion or pneumothorax. The cardiomediastinal silhouette is stable. No overt pulmonary edema. The osseous structures are stable. Subtle right infrahilar airspace opacity within the medial right lung base.          Labs  Results for orders placed or performed during the hospital encounter of 07/25/20   CBC Auto Differential   Result Value Ref Range    WBC 16.6 (H) 4.0 - 11.0 K/uL    RBC 2.36 (L) 4.20 - 5.90 M/uL    Hemoglobin 8.2 (L) 13.5 - 17.5 g/dL    Hematocrit 22.7 (L) 40.5 - 52.5 %    MCV 96.3 80.0 - 100.0 fL    MCH 34.6 (H) 26.0 - 34.0 pg    MCHC 35.9 31.0 - 36.0 g/dL    RDW 23.2 (H) 12.4 - 15.4 %    Platelets 139 560 - 092 K/uL    MPV 8.9 5.0 - 10.5 fL    PLATELET SLIDE REVIEW Adequate     Path Consult No     Neutrophils % 66.0 %    Lymphocytes % 20.0 %    Monocytes % 9.0 %    Eosinophils % 0.0 %    Basophils % 1.0 %    Neutrophils Absolute 11.6 (H) 1.7 - 7.7 K/uL    Lymphocytes Absolute 3.3 1.0 - 5.1 K/uL    Monocytes Absolute 1.5 (H) 0.0 - 1.3 K/uL    Eosinophils Absolute 0.0 0.0 - 0.6 K/uL    Basophils Absolute 0.2 0.0 - 0.2 K/uL    Bands Relative 4 0 - 7 %    nRBC 4 (A) /100 WBC    nRBC 4 (A) /100 WBC    Anisocytosis 2+ (A)     Macrocytes 1+ (A)     Polychromasia 1+ (A)     Poikilocytes 3+ (A)     Ovalocytes Occasional (A)     Target Cells Occasional (A)     Tear Drop Cells Occasional (A)     Sickle Cells 2+ (A)    Basic Metabolic Panel w/ Reflex to MG   Result Value Ref Range    Sodium 136 136 - 145 mmol/L    Potassium reflex Magnesium 4.3 3.5 - 5.1 mmol/L    Chloride 102 99 - 110 mmol/L    CO2 24 21 - 32 mmol/L    Anion Gap 10 3 - 16    Glucose 90 70 - 99 mg/dL    BUN 14 7 - 20 mg/dL    CREATININE 0.8 (L) 0.9 - 1.3 mg/dL    GFR Non-African American >60 >60    GFR African American >60 >60    Calcium 9.6 8.3 - 10.6 mg/dL   Reticulocytes   Result Value Ref Range    Retic Ct Pct 10.83 (H) 0.50 - 2.18 %    Retic Ct Abs 0.256 M/uL    Immature Retic Fract 0.72 (H) 0.21 - 0.37         MDM  67-year-old male sickle cell anemia returns emergency department with continued bilateral sickle cell pains in his lower extremities and shortness of breath. On exam he is overall well-appearing speaking in full sentences nontoxic, his vital signs are reassuring. Clear breath sounds bilaterally otherwise reassuring exam.  Chest x-ray showing new infiltrate right lower lobe, new leukocytosis. Complaint of shortness of breath.   We will treat for pneumonia and may benefit from observation given the fact that he is immunocompromised status post splenectomy. At this point I doubt acute chest.  We will plan to admit. Discussed with hospitalist Dr. Twyla Herrera who agrees to accept patient's care from here. Clinical Impression:  1. Sickle cell pain crisis ([address]    2. Pneumonia due to organism          Disposition:  Admit to telemetry in stable condition. Blood pressure 115/68, pulse 68, temperature 98.1 °F (36.7 °C), temperature source Oral, resp. rate 16, SpO2 97 %. Patient was given scripts for the following medications. I counseled patient how to take these medications. New Prescriptions    No medications on file       Disposition referral (if applicable):  No follow-up provider specified. Total critical care time is 0 minutes, which excludes separately billable procedures and updating family. Time spent is specifically for management of the presenting complaint and symptoms initially, direct bedside care, reevaluation, review of records, and consultation. There was a high probability of clinically significant life-threatening deterioration in the patient's condition, which required my urgent intervention. This chart was generated in part by using Dragon Dictation system and may contain errors related to that system including errors in grammar, punctuation, and spelling, as well as words and phrases that may be inappropriate. If there are any questions or concerns please feel free to contact the dictating provider for clarification.      Ignacio Zepeda MD  [address] MD  07/25/20 3461

## 2020-07-25 NOTE — PROGRESS NOTES
4 Eyes Skin Assessment     The patient is being assess for   Admission    I agree that 2 RN's have performed a thorough Head to Toe Skin Assessment on the patient. ALL assessment sites listed below have been assessed. Areas assessed by both nurses:   [x]   Head, Face, and Ears   [x]   Shoulders, Back, and Chest, Abdomen  [x]   Arms, Elbows, and Hands   []   Coccyx, Sacrum, and Ischium  [x]   Legs, Feet, and Heels        Patient refused coccyx, sacrum and ischium assessment by nurse. .......    **SHARE this note so that the co-signing nurse is able to place an eSignature**    Co-signer eSignature: Electronically signed by Rashida Travis RN on 7/25/20 at 7:23 AM EDT    Does the Patient have Skin Breakdown?   No          Shaun Prevention initiated:  No   Wound Care Orders initiated:  No      [facility] nurse consulted for Pressure Injury (Stage 3,4, Unstageable, DTI, NWPT, Complex wounds)and New or Established Ostomies:  No      Primary Nurse eSignature: Electronically signed by Liudmila Vaz RN on 7/25/20 at 6:48 AM EDT

## 2020-07-25 NOTE — H&P
Hospital Medicine History & Physical      Patient:  eFrmin Currie  :   2000  MRN:   [id number]  Date of Service: 20    CHIEF COMPLAINT:  Leg pain    HISTORY OF PRESENT ILLNESS:    Fermin Currie is a 23 y.o. male. He has a h/o sickle cell anemia. He has chronic lower extremity pain which waxes and wanes. Usually he says in mild flares pain stays at or below his knees. Lately pain has been creeping up into his thighs. He has oral hydromorphone available for use at home, but has been unable to keep up with the pain this way so comes in tonight for further care. This flare has been building for roughly a week. He visited the ER for the same complaint on . He otherwise feels ok. He denies rigors, sweats, anorexia, nausea, diarrhea. He states that sometimes when he takes a deep breath it feels as if he isn't really getting a deep breath. He denies outright dyspnea, wheezing, cough, pleuritic pain, and hemoptysis. Review of Systems:  All pertinent positives and negatives are as noted in the HPI section. All other systems were reviewed and are negative. Past Medical History:   Diagnosis Date    Asthma     Enlarged heart     Sickle cell anemia (HCC)        Past Surgical History:   Procedure Laterality Date    SPLENECTOMY           Prior to Admission medications    Medication Sig Start Date End Date Taking? Authorizing Provider   folic acid (FOLVITE) 1 MG tablet Take 1 mg by mouth daily   Yes Historical Provider, MD   hydroxyurea (HYDREA) 500 MG chemo capsule Take by mouth daily Unsure of dose   Yes Historical Provider, MD   methadone (DOLOPHINE) 5 MG tablet Take 5 mg by mouth every 4 hours as needed for Pain. Yes Historical Provider, MD   HYDROmorphone (DILAUDID) 2 MG tablet Take 1 tablet by mouth every 4 hours as needed for Pain for up to 3 days. 20 Yes Monica Cool DO       Allergies:   Morphine    Social:   reports that he has never smoked.  He has never used smokeless tobacco.   reports previous alcohol use. Social History     Substance and Sexual Activity   Drug Use Never       History reviewed. No pertinent family history. PHYSICAL EXAM:  I performed this physical examination. Vitals:  Patient Vitals for the past 24 hrs:   BP Temp Temp src Pulse Resp SpO2   07/25/20 0406 115/68 -- -- 68 16 97 %   07/25/20 0300 111/64 98.1 °F (36.7 °C) Oral 66 16 95 %       GEN:  Appearance:  Overall healthy appearing male . Level of Consciousness:  alert . Orientation:  full    HEENT: Sclera slightly icteric.  no conjunctival chemosis. moist mucus membranes. no specific or diagnostic oral lesions. NECK:  no signs of meningismus. Jugular veins not distended. Carotid pulses  2+.  no cervical lymphadenopathy. no thyromegaly. CV:  regular rhythm. normal S1 & S2.    no murmur. no rub.  no gallop. PULM:  Chest excursion is symmetric. Breath sounds are vesicular. Adventitious sounds:  none    AB:  Abdominal shape is normal.  Bowel sounds are active. Generally soft to palpation. no tenderness is present. no involuntary guarding. no rebound guarding. EXTR:  Skin is warm. Capillary refill brisk. no specific or pathognomic rash. no clubbing. no pitting edema. no active wound or ulcer. LABS:  Lab Results   Component Value Date    WBC 16.6 (H) 07/25/2020    HGB 8.2 (L) 07/25/2020    HCT 22.7 (L) 07/25/2020    MCV 96.3 07/25/2020     07/25/2020     Lab Results   Component Value Date    CREATININE 0.8 (L) 07/25/2020    BUN 14 07/25/2020     07/25/2020    K 4.3 07/25/2020     07/25/2020    CO2 24 07/25/2020     Lab Results   Component Value Date    ALT 17 07/22/2020    AST 29 07/22/2020    ALKPHOS 58 07/22/2020    BILITOT 5.9 (H) 07/22/2020     Lab Results   Component Value Date    TROPONINI <0.01 11/26/2019     No results for input(s): PHART, IBR2CJE, PO2ART in the last 72 hours.     IMAGING:  Xr Chest

## 2020-07-25 NOTE — ED NOTES
[facility] @ 0422  RE: sickle cell, PNA per Dr. Belia Villagomez called back @ [facility]  07/25/20 6981

## 2020-07-25 NOTE — PROGRESS NOTES
07/25/20 @ 7409 -Oncology consult perfect served and sent out to Dr. No Richard at this time.     -Khushboo Conteh, PCA

## 2020-07-26 LAB
A/G RATIO: 1.7 (ref 1.1–2.2)
ALBUMIN SERPL-MCNC: 4 G/DL (ref 3.4–5)
ALP BLD-CCNC: 54 U/L (ref 40–129)
ALT SERPL-CCNC: 11 U/L (ref 10–40)
ANION GAP SERPL CALCULATED.3IONS-SCNC: 9 MMOL/L (ref 3–16)
AST SERPL-CCNC: 21 U/L (ref 15–37)
BASOPHILS ABSOLUTE: 0.1 K/UL (ref 0–0.2)
BASOPHILS RELATIVE PERCENT: 0.8 %
BILIRUB SERPL-MCNC: 4.3 MG/DL (ref 0–1)
BUN BLDV-MCNC: 11 MG/DL (ref 7–20)
CALCIUM SERPL-MCNC: 9.2 MG/DL (ref 8.3–10.6)
CHLORIDE BLD-SCNC: 106 MMOL/L (ref 99–110)
CO2: 25 MMOL/L (ref 21–32)
CREAT SERPL-MCNC: 0.7 MG/DL (ref 0.9–1.3)
EKG ATRIAL RATE: 70 BPM
EKG DIAGNOSIS: NORMAL
EKG P AXIS: -7 DEGREES
EKG P-R INTERVAL: 152 MS
EKG Q-T INTERVAL: 402 MS
EKG QRS DURATION: 100 MS
EKG QTC CALCULATION (BAZETT): 434 MS
EKG R AXIS: 78 DEGREES
EKG T AXIS: 29 DEGREES
EKG VENTRICULAR RATE: 70 BPM
EOSINOPHILS ABSOLUTE: 0.2 K/UL (ref 0–0.6)
EOSINOPHILS RELATIVE PERCENT: 1.8 %
GFR AFRICAN AMERICAN: >60
GFR NON-AFRICAN AMERICAN: >60
GLOBULIN: 2.4 G/DL
GLUCOSE BLD-MCNC: 92 MG/DL (ref 70–99)
HCT VFR BLD CALC: 21.2 % (ref 40.5–52.5)
HEMOGLOBIN: 7.5 G/DL (ref 13.5–17.5)
LYMPHOCYTES ABSOLUTE: 2.1 K/UL (ref 1–5.1)
LYMPHOCYTES RELATIVE PERCENT: 18.4 %
MAGNESIUM: 1.9 MG/DL (ref 1.8–2.4)
MCH RBC QN AUTO: 34.6 PG (ref 26–34)
MCHC RBC AUTO-ENTMCNC: 35.6 G/DL (ref 31–36)
MCV RBC AUTO: 97.1 FL (ref 80–100)
MONOCYTES ABSOLUTE: 1.3 K/UL (ref 0–1.3)
MONOCYTES RELATIVE PERCENT: 11.4 %
NEUTROPHILS ABSOLUTE: 7.8 K/UL (ref 1.7–7.7)
NEUTROPHILS RELATIVE PERCENT: 67.6 %
PDW BLD-RTO: 26 % (ref 12.4–15.4)
PLATELET # BLD: 405 K/UL (ref 135–450)
PLATELET SLIDE REVIEW: ADEQUATE
PMV BLD AUTO: 9.2 FL (ref 5–10.5)
POTASSIUM SERPL-SCNC: 4.1 MMOL/L (ref 3.5–5.1)
RBC # BLD: 2.18 M/UL (ref 4.2–5.9)
SLIDE REVIEW: ABNORMAL
SODIUM BLD-SCNC: 140 MMOL/L (ref 136–145)
TOTAL PROTEIN: 6.4 G/DL (ref 6.4–8.2)
WBC # BLD: 11.6 K/UL (ref 4–11)

## 2020-07-26 PROCEDURE — 85025 COMPLETE CBC W/AUTO DIFF WBC: CPT

## 2020-07-26 PROCEDURE — 80053 COMPREHEN METABOLIC PANEL: CPT

## 2020-07-26 PROCEDURE — 96376 TX/PRO/DX INJ SAME DRUG ADON: CPT

## 2020-07-26 PROCEDURE — 6360000002 HC RX W HCPCS: Performed by: INTERNAL MEDICINE

## 2020-07-26 PROCEDURE — 2580000003 HC RX 258: Performed by: INTERNAL MEDICINE

## 2020-07-26 PROCEDURE — 6370000000 HC RX 637 (ALT 250 FOR IP): Performed by: INTERNAL MEDICINE

## 2020-07-26 PROCEDURE — 36415 COLL VENOUS BLD VENIPUNCTURE: CPT

## 2020-07-26 PROCEDURE — 83735 ASSAY OF MAGNESIUM: CPT

## 2020-07-26 PROCEDURE — 96375 TX/PRO/DX INJ NEW DRUG ADDON: CPT

## 2020-07-26 PROCEDURE — G0378 HOSPITAL OBSERVATION PER HR: HCPCS

## 2020-07-26 RX ORDER — HYDROMORPHONE HCL 110MG/55ML
2 PATIENT CONTROLLED ANALGESIA SYRINGE INTRAVENOUS EVERY 4 HOURS PRN
Status: DISCONTINUED | OUTPATIENT
Start: 2020-07-26 | End: 2020-07-29

## 2020-07-26 RX ORDER — METHADONE HYDROCHLORIDE 5 MG/1
5 TABLET ORAL 2 TIMES DAILY
Status: DISCONTINUED | OUTPATIENT
Start: 2020-07-26 | End: 2020-07-28

## 2020-07-26 RX ORDER — KETOROLAC TROMETHAMINE 30 MG/ML
30 INJECTION, SOLUTION INTRAMUSCULAR; INTRAVENOUS 3 TIMES DAILY
Status: COMPLETED | OUTPATIENT
Start: 2020-07-26 | End: 2020-07-28

## 2020-07-26 RX ADMIN — HYDROMORPHONE HYDROCHLORIDE 1 MG: 1 INJECTION, SOLUTION INTRAMUSCULAR; INTRAVENOUS; SUBCUTANEOUS at 06:47

## 2020-07-26 RX ADMIN — HYDROMORPHONE HYDROCHLORIDE 2 MG: 2 TABLET ORAL at 00:57

## 2020-07-26 RX ADMIN — METHADONE HYDROCHLORIDE 5 MG: 5 TABLET ORAL at 20:59

## 2020-07-26 RX ADMIN — HYDROMORPHONE HYDROCHLORIDE 1 MG: 1 INJECTION, SOLUTION INTRAMUSCULAR; INTRAVENOUS; SUBCUTANEOUS at 02:11

## 2020-07-26 RX ADMIN — HYDROMORPHONE HYDROCHLORIDE 1 MG: 1 INJECTION, SOLUTION INTRAMUSCULAR; INTRAVENOUS; SUBCUTANEOUS at 19:03

## 2020-07-26 RX ADMIN — HYDROMORPHONE HYDROCHLORIDE 2 MG: 2 TABLET ORAL at 22:11

## 2020-07-26 RX ADMIN — SODIUM CHLORIDE, POTASSIUM CHLORIDE, SODIUM LACTATE AND CALCIUM CHLORIDE: 600; 310; 30; 20 INJECTION, SOLUTION INTRAVENOUS at 09:06

## 2020-07-26 RX ADMIN — HYDROMORPHONE HYDROCHLORIDE 2 MG: 2 TABLET ORAL at 11:10

## 2020-07-26 RX ADMIN — HYDROMORPHONE HYDROCHLORIDE 1 MG: 1 INJECTION, SOLUTION INTRAMUSCULAR; INTRAVENOUS; SUBCUTANEOUS at 12:45

## 2020-07-26 RX ADMIN — HYDROXYUREA 2000 MG: 500 CAPSULE ORAL at 09:17

## 2020-07-26 RX ADMIN — KETOROLAC TROMETHAMINE 30 MG: 30 INJECTION, SOLUTION INTRAMUSCULAR at 21:00

## 2020-07-26 RX ADMIN — KETOROLAC TROMETHAMINE 30 MG: 30 INJECTION, SOLUTION INTRAMUSCULAR at 09:06

## 2020-07-26 RX ADMIN — HYDROMORPHONE HYDROCHLORIDE 2 MG: 2 TABLET ORAL at 05:22

## 2020-07-26 RX ADMIN — HYDROMORPHONE HYDROCHLORIDE 2 MG: 2 INJECTION, SOLUTION INTRAMUSCULAR; INTRAVENOUS; SUBCUTANEOUS at 23:15

## 2020-07-26 RX ADMIN — Medication 10 ML: at 09:06

## 2020-07-26 RX ADMIN — SODIUM CHLORIDE, POTASSIUM CHLORIDE, SODIUM LACTATE AND CALCIUM CHLORIDE: 600; 310; 30; 20 INJECTION, SOLUTION INTRAVENOUS at 03:17

## 2020-07-26 RX ADMIN — FOLIC ACID 1 MG: 1 TABLET ORAL at 09:06

## 2020-07-26 RX ADMIN — HYDROMORPHONE HYDROCHLORIDE 2 MG: 2 TABLET ORAL at 17:55

## 2020-07-26 RX ADMIN — KETOROLAC TROMETHAMINE 30 MG: 30 INJECTION, SOLUTION INTRAMUSCULAR at 15:58

## 2020-07-26 RX ADMIN — SODIUM CHLORIDE, POTASSIUM CHLORIDE, SODIUM LACTATE AND CALCIUM CHLORIDE: 600; 310; 30; 20 INJECTION, SOLUTION INTRAVENOUS at 19:49

## 2020-07-26 RX ADMIN — METHADONE HYDROCHLORIDE 5 MG: 5 TABLET ORAL at 09:06

## 2020-07-26 ASSESSMENT — PAIN SCALES - GENERAL
PAINLEVEL_OUTOF10: 5
PAINLEVEL_OUTOF10: 8
PAINLEVEL_OUTOF10: 8
PAINLEVEL_OUTOF10: 7
PAINLEVEL_OUTOF10: 8
PAINLEVEL_OUTOF10: 9
PAINLEVEL_OUTOF10: 5
PAINLEVEL_OUTOF10: 7
PAINLEVEL_OUTOF10: 7
PAINLEVEL_OUTOF10: 6
PAINLEVEL_OUTOF10: 5
PAINLEVEL_OUTOF10: 6
PAINLEVEL_OUTOF10: 7
PAINLEVEL_OUTOF10: 7
PAINLEVEL_OUTOF10: 8
PAINLEVEL_OUTOF10: 7
PAINLEVEL_OUTOF10: 8
PAINLEVEL_OUTOF10: 8
PAINLEVEL_OUTOF10: 7
PAINLEVEL_OUTOF10: 7

## 2020-07-26 ASSESSMENT — PAIN DESCRIPTION - ORIENTATION
ORIENTATION: RIGHT;LEFT

## 2020-07-26 ASSESSMENT — PAIN DESCRIPTION - DESCRIPTORS
DESCRIPTORS: ACHING

## 2020-07-26 ASSESSMENT — PAIN DESCRIPTION - LOCATION
LOCATION: LEG

## 2020-07-26 ASSESSMENT — PAIN DESCRIPTION - ONSET
ONSET: ON-GOING

## 2020-07-26 ASSESSMENT — PAIN - FUNCTIONAL ASSESSMENT
PAIN_FUNCTIONAL_ASSESSMENT: PREVENTS OR INTERFERES SOME ACTIVE ACTIVITIES AND ADLS
PAIN_FUNCTIONAL_ASSESSMENT: PREVENTS OR INTERFERES SOME ACTIVE ACTIVITIES AND ADLS

## 2020-07-26 ASSESSMENT — PAIN DESCRIPTION - PAIN TYPE
TYPE: ACUTE PAIN

## 2020-07-26 ASSESSMENT — PAIN DESCRIPTION - FREQUENCY
FREQUENCY: CONTINUOUS

## 2020-07-26 ASSESSMENT — PAIN DESCRIPTION - PROGRESSION
CLINICAL_PROGRESSION: NOT CHANGED
CLINICAL_PROGRESSION: GRADUALLY IMPROVING
CLINICAL_PROGRESSION: NOT CHANGED

## 2020-07-26 NOTE — PLAN OF CARE
Problem: Pain:  Goal: Pain level will decrease  Description: Pain level will decrease  Outcome: Ongoing     Problem: Pain:  Goal: Control of acute pain  Description: Control of acute pain  Outcome: Ongoing       Patient able to use pain rating scale 0/10 adequately without problems. Pain medications explained along with frequency and when to notify the nurse with  possible side effects. Verbalizes understanding. Patient requesting IV and PO dilaudid in addition to PO methadone and IV Toradol. Patient satisfied with current pain control regimen. Call light within reach. Resting quietly in bed. Denies needs at present.

## 2020-07-26 NOTE — PROGRESS NOTES
Hospitalist Progress Note      PCP: Yony Earl MD    Date of Admission: 7/25/2020    Chief Complaint:   Chief Complaint   Patient presents with    Sickle Cell Pain Crisis     joselyn lower leg pain for a couple days. Pt seen here a couple days ago. Hospital Course: Lawyer Ford is a 23 y.o. male. He has a h/o sickle cell anemia. He has chronic lower extremity pain which waxes and wanes. Usually he says in mild flares pain stays at or below his knees. Lately pain has been creeping up into his thighs. He has oral hydromorphone available for use at home, but has been unable to keep up with the pain this way so comes in tonight for further care. This flare has been building for roughly a week. He visited the ER for the same complaint on 7/22. He otherwise feels ok. He denies rigors, sweats, anorexia, nausea, diarrhea. He states that sometimes when he takes a deep breath it feels as if he isn't really getting a deep breath. He denies outright dyspnea, wheezing, cough, pleuritic pain, and hemoptysis. Subjective:       DC plan:      No new medical concerns  Bedside rounding with nursing completed.     Medications:  Reviewed    Infusion Medications    lactated ringers 125 mL/hr at 07/26/20 8122     Scheduled Medications    methadone  5 mg Oral BID    ketorolac  30 mg Intravenous TID    hydroxyurea  2,000 mg Oral Daily    folic acid  1 mg Oral Daily    enoxaparin  40 mg Subcutaneous Daily     PRN Meds: HYDROmorphone, sodium chloride flush, potassium chloride **OR** potassium alternative oral replacement **OR** potassium chloride, magnesium sulfate, acetaminophen, acetaminophen, acetaminophen, ondansetron, ondansetron, promethazine, promethazine, melatonin ER, HYDROmorphone, albuterol      Intake/Output Summary (Last 24 hours) at 7/26/2020 0809  Last data filed at 7/26/2020 0530  Gross per 24 hour   Intake 1490 ml   Output 3070 ml   Net -1580 ml       Physical Exam Performed:    BP 131/71   Pulse 94   Temp 98.3 °F (36.8 °C) (Oral)   Resp 18   Ht 5' 8\" (1.727 m)   Wt 178 lb (80.7 kg)   SpO2 95%   BMI 27.06 kg/m²     General appearance: No apparent distress, appears stated age and cooperative. HEENT: Pupils equal, round, and reactive to light. Conjunctivae/corneas clear. Neck: Supple, with full range of motion. No jugular venous distention. Trachea midline. Respiratory:  Normal respiratory effort. Clear to auscultation, bilaterally without Rales/Wheezes/Rhonchi. Cardiovascular: Regular rate and rhythm with normal S1/S2 without murmurs, rubs or gallops. Abdomen: Soft, non-tender, non-distended with normal bowel sounds. Musculoskeletal: No clubbing, cyanosis or edema bilaterally. Full range of motion without deformity. Skin: Skin color, texture, turgor normal.  No rashes or lesions. Neurologic:  Neurovascularly intact without any focal sensory/motor deficits. Cranial nerves: II-XII intact, grossly non-focal.  Psychiatric: Alert and oriented, thought content appropriate, normal insight  Capillary Refill: Brisk,< 3 seconds   Peripheral Pulses: +2 palpable, equal bilaterally       Labs:   Recent Labs     07/25/20 0325 07/26/20  0617   WBC 16.6* 11.6*   HGB 8.2* 7.5*   HCT 22.7* 21.2*    405     Recent Labs     07/25/20 0325 07/26/20  0617    140   K 4.3 4.1    106   CO2 24 25   BUN 14 11   CREATININE 0.8* 0.7*   CALCIUM 9.6 9.2     Recent Labs     07/25/20  0325 07/26/20  0617   AST 34 21   ALT 13 11   BILIDIR 0.3  --    BILITOT 5.2* 4.3*   ALKPHOS 59 54     No results for input(s): INR in the last 72 hours. No results for input(s): Thelda Rough in the last 72 hours.     Urinalysis:      Lab Results   Component Value Date    NITRU Negative 03/14/2020    BLOODU Negative 03/14/2020    SPECGRAV 1.020 03/14/2020    GLUCOSEU Negative 03/14/2020       Radiology:  XR CHEST PORTABLE   Final Result   Subtle right infrahilar airspace opacity within the medial right lung base. Assessment/Plan:    Active Hospital Problems    Diagnosis Date Noted    Sickle cell pain crisis ([facility] [address] [D57.00] 07/25/2020    Asthma [J45.909] 07/25/2020     1. Sickle Cell Pain Crisis, no evidence of acute chest syndrome  -  Continue LR @ 125 mL/hr. Continue oral hydromorphone plus make IV available for breakthrough pain. -  Continue prn dilaudid, toradol.   -  Continue hydrea and folic acid. -  Heme following.      Asthma  -  Asymptomatic at this time. -  Portable CXR suggested a subtle abnormality. No clinical evidence of a respiratory or other infectious process. -  Home albuterol available prn. DVT Prophylaxis: Lovenox  Diet: DIET GENERAL;  Code Status: Full Code    PT/OT Eval Status: Not needed given independent ambulatory status. Dispo - 1-2 days.      CARMELITA Moses - CNP

## 2020-07-26 NOTE — CONSULTS
[facility]    Consult Note      Requesting Physician: Dr. Patty Liriano:  Sickle cell crisis        HISTORY OF PRESENT ILLNESS:      Mr. Enrique Clayton  is a 23 y.o. male we are seeing in consultation for Sickle cell crisis. The patient recently transferred his care from [facility] to Dr. Bailey Liu in [place]. He lives in the [place] area. He has very infrequent pain crisis and his last admission was over a year ago. The patient states he takes methadone 5 mg p.o. twice daily at home for pain and is managed by pain specialist.  Also, he does take his Hydrea regularly.       Past Medical History:    Past Medical History:   Diagnosis Date    Asthma     Enlarged heart     Sickle cell anemia (HCC)      Past Surgical History:    Past Surgical History:   Procedure Laterality Date    SPLENECTOMY         Current Medications:    Current Facility-Administered Medications   Medication Dose Route Frequency Provider Last Rate Last Dose    methadone (DOLOPHINE) tablet 5 mg  5 mg Oral BID Musa Mendoza MD        ketorolac (TORADOL) injection 30 mg  30 mg Intravenous TID Musa Mendoza MD        HYDROmorphone (DILAUDID) tablet 2 mg  2 mg Oral Q4H PRN Arturo Nicholson MD   2 mg at 07/26/20 0522    hydroxyurea (HYDREA) chemo capsule 2,000 mg  2,000 mg Oral Daily Arturo Nicholson MD   2,000 mg at 60/60/75 9290    folic acid (FOLVITE) tablet 1 mg  1 mg Oral Daily Arturo Nicholson MD   1 mg at 07/25/20 0830    sodium chloride flush 0.9 % injection 10 mL  10 mL Intravenous PRN Arturo Nicholson MD        potassium chloride (KLOR-CON M) extended release tablet 40 mEq  40 mEq Oral PRN Arturo Nicholson MD        Or    potassium bicarb-citric acid (EFFER-K) effervescent tablet 40 mEq  40 mEq Oral PRN Arturo Nicholson MD        Or    potassium chloride 10 mEq/100 mL IVPB (Peripheral Line)  10 mEq Intravenous PRN Arturo Nicholson MD       [address] [place] [address] magnesium sulfate 1 g in dextrose 5% 100 mL IVPB  1 g Intravenous PRN Frandy Miramontes MD        enoxaparin (LOVENOX) injection 40 mg  40 mg Subcutaneous Daily Frandy Miramontes MD        acetaminophen (TYLENOL) tablet 650 mg  650 mg Oral Q4H PRN Frandy Miramontes MD        acetaminophen (TYLENOL) 160 MG/5ML solution 650 mg  650 mg Oral Q4H PRN Frandy Miramontes MD        acetaminophen (TYLENOL) suppository 650 mg  650 mg Rectal Q4H PRN Frandy Miramontes MD        ondansetron [facility]) injection 4 mg  4 mg Intravenous Q4H PRN Frandy Miramontes MD        ondansetron (ZOFRAN-ODT) disintegrating tablet 4 mg  4 mg Oral Q4H PRN Frandy Miramontes MD        promethazine (PHENERGAN) injection 12.5 mg  12.5 mg Intravenous Q4H PRN Frandy Miramontes MD        promethazine (PHENERGAN) tablet 12.5 mg  12.5 mg Oral Q4H PRN Frandy Miramontes MD        melatonin ER tablet 2 mg  2 mg Oral Nightly PRN Frandy Miramontes MD        lactated ringers infusion   Intravenous Continuous Frandy Miramontes  mL/hr at 07/26/20 0317      HYDROmorphone (DILAUDID) injection 1 mg  1 mg Intravenous Q4H PRN Frandy Miramontes MD   1 mg at 07/26/20 0647    albuterol (PROVENTIL) nebulizer solution 2.5 mg  2.5 mg Nebulization Q6H PRN Frandy Miramontes MD         Allergies:     Allergies   Allergen Reactions    Morphine Shortness Of Breath       Social History:   Social History     Socioeconomic History    Marital status: Single     Spouse name: Not on file    Number of children: Not on file    Years of education: Not on file    Highest education level: Not on file   Occupational History    Not on file   Social Needs    Financial resource strain: Not on file    Food insecurity     Worry: Not on file     Inability: Not on file    Transportation needs     Medical: Not on file     Non-medical: Not on file   Tobacco Use    Smoking status: Never Smoker    Smokeless tobacco: Never Used   Substance and Sexual Activity    Alcohol use: Not Currently    Drug use: Never    Sexual activity: Not on file   Lifestyle    Physical activity     Days per week: Not on file     Minutes per session: Not on file    Stress: Not on file   Relationships    Social connections     Talks on phone: Not on file     Gets together: Not on file     Attends Confucianism service: Not on file     Active member of club or organization: Not on file     Attends meetings of clubs or organizations: Not on file     Relationship status: Not on file    Intimate partner violence     Fear of current or ex partner: Not on file     Emotionally abused: Not on file     Physically abused: Not on file     Forced sexual activity: Not on file   Other Topics Concern    Not on file   Social History Narrative    Not on file          Family History:     History reviewed. No pertinent family history. REVIEW OF SYSTEMS:    Review of Systems    PHYSICAL EXAM:      Vitals:  /71   Pulse 94   Temp 98.3 °F (36.8 °C) (Oral)   Resp 18   Ht 5' 8\" (1.727 m)   Wt 178 lb (80.7 kg)   SpO2 95%   BMI 27.06 kg/m²     CONSTITUTIONAL:  awake, alert, cooperative, no apparent distress, and appears stated age NAD  EYES:  pupils equal, round and reactive to light, extra ocular muscles intact, sclera clear, conjunctiva normal  NECK:Supple, symmetrical, trachea midline, no adenopathy, thyroid symmetric, not enlarged and no tenderness, skin normal  HEMATOLOGIC/LYMPHATICS:  no cervical lymphadenopathy, no supraclavicular lymphadenopathy, no axillarylymphadenopathy and no inguinal lymphadenopathy  LUNGS:  No increased work of breathing, good air exchange, clear to auscultation bilaterally, no crackles or wheezing  CARDIOVASCULAR:  , regular rate and rhythm, normalS1 and S2, no S3 or S4, and no murmur noted  ABDOMEN:  No scars, normal bowel sounds, soft, non-distended, non-tender, no masses palpated, no hepatosplenomegally      MUSCULOSKELETAL:  There is no redness, warmth,or swelling of the joints.   Full Perfect Serve

## 2020-07-27 LAB
A/G RATIO: 1.7 (ref 1.1–2.2)
ALBUMIN SERPL-MCNC: 4 G/DL (ref 3.4–5)
ALP BLD-CCNC: 54 U/L (ref 40–129)
ALT SERPL-CCNC: 9 U/L (ref 10–40)
ANION GAP SERPL CALCULATED.3IONS-SCNC: 9 MMOL/L (ref 3–16)
AST SERPL-CCNC: 25 U/L (ref 15–37)
BASOPHILS ABSOLUTE: 0.2 K/UL (ref 0–0.2)
BASOPHILS RELATIVE PERCENT: 1.4 %
BILIRUB SERPL-MCNC: 5.4 MG/DL (ref 0–1)
BUN BLDV-MCNC: 10 MG/DL (ref 7–20)
CALCIUM SERPL-MCNC: 9.5 MG/DL (ref 8.3–10.6)
CHLORIDE BLD-SCNC: 104 MMOL/L (ref 99–110)
CO2: 26 MMOL/L (ref 21–32)
CREAT SERPL-MCNC: 0.6 MG/DL (ref 0.9–1.3)
EOSINOPHILS ABSOLUTE: 0.3 K/UL (ref 0–0.6)
EOSINOPHILS RELATIVE PERCENT: 2.8 %
GFR AFRICAN AMERICAN: >60
GFR NON-AFRICAN AMERICAN: >60
GLOBULIN: 2.3 G/DL
GLUCOSE BLD-MCNC: 95 MG/DL (ref 70–99)
HCT VFR BLD CALC: 21.4 % (ref 40.5–52.5)
HEMOGLOBIN: 7.5 G/DL (ref 13.5–17.5)
LYMPHOCYTES ABSOLUTE: 2.2 K/UL (ref 1–5.1)
LYMPHOCYTES RELATIVE PERCENT: 19.5 %
MAGNESIUM: 2 MG/DL (ref 1.8–2.4)
MCH RBC QN AUTO: 35.1 PG (ref 26–34)
MCHC RBC AUTO-ENTMCNC: 35.2 G/DL (ref 31–36)
MCV RBC AUTO: 99.7 FL (ref 80–100)
MONOCYTES ABSOLUTE: 1.3 K/UL (ref 0–1.3)
MONOCYTES RELATIVE PERCENT: 11.3 %
NEUTROPHILS ABSOLUTE: 7.3 K/UL (ref 1.7–7.7)
NEUTROPHILS RELATIVE PERCENT: 65 %
PDW BLD-RTO: 26.4 % (ref 12.4–15.4)
PLATELET # BLD: 391 K/UL (ref 135–450)
PLATELET SLIDE REVIEW: ADEQUATE
PMV BLD AUTO: 9.2 FL (ref 5–10.5)
POTASSIUM SERPL-SCNC: 4.1 MMOL/L (ref 3.5–5.1)
RBC # BLD: 2.14 M/UL (ref 4.2–5.9)
SLIDE REVIEW: ABNORMAL
SODIUM BLD-SCNC: 139 MMOL/L (ref 136–145)
TOTAL PROTEIN: 6.3 G/DL (ref 6.4–8.2)
WBC # BLD: 11.2 K/UL (ref 4–11)

## 2020-07-27 PROCEDURE — 2580000003 HC RX 258: Performed by: INTERNAL MEDICINE

## 2020-07-27 PROCEDURE — 85025 COMPLETE CBC W/AUTO DIFF WBC: CPT

## 2020-07-27 PROCEDURE — 96376 TX/PRO/DX INJ SAME DRUG ADON: CPT

## 2020-07-27 PROCEDURE — 36415 COLL VENOUS BLD VENIPUNCTURE: CPT

## 2020-07-27 PROCEDURE — G0378 HOSPITAL OBSERVATION PER HR: HCPCS

## 2020-07-27 PROCEDURE — 6360000002 HC RX W HCPCS: Performed by: INTERNAL MEDICINE

## 2020-07-27 PROCEDURE — 6370000000 HC RX 637 (ALT 250 FOR IP): Performed by: INTERNAL MEDICINE

## 2020-07-27 PROCEDURE — 80053 COMPREHEN METABOLIC PANEL: CPT

## 2020-07-27 PROCEDURE — 83735 ASSAY OF MAGNESIUM: CPT

## 2020-07-27 RX ADMIN — HYDROMORPHONE HYDROCHLORIDE 2 MG: 2 INJECTION, SOLUTION INTRAMUSCULAR; INTRAVENOUS; SUBCUTANEOUS at 12:42

## 2020-07-27 RX ADMIN — SODIUM CHLORIDE, POTASSIUM CHLORIDE, SODIUM LACTATE AND CALCIUM CHLORIDE: 600; 310; 30; 20 INJECTION, SOLUTION INTRAVENOUS at 03:05

## 2020-07-27 RX ADMIN — HYDROMORPHONE HYDROCHLORIDE 2 MG: 2 TABLET ORAL at 06:54

## 2020-07-27 RX ADMIN — METHADONE HYDROCHLORIDE 5 MG: 5 TABLET ORAL at 21:04

## 2020-07-27 RX ADMIN — HYDROMORPHONE HYDROCHLORIDE 2 MG: 2 INJECTION, SOLUTION INTRAMUSCULAR; INTRAVENOUS; SUBCUTANEOUS at 04:16

## 2020-07-27 RX ADMIN — KETOROLAC TROMETHAMINE 30 MG: 30 INJECTION, SOLUTION INTRAMUSCULAR at 14:30

## 2020-07-27 RX ADMIN — KETOROLAC TROMETHAMINE 30 MG: 30 INJECTION, SOLUTION INTRAMUSCULAR at 09:09

## 2020-07-27 RX ADMIN — HYDROMORPHONE HYDROCHLORIDE 2 MG: 2 TABLET ORAL at 16:05

## 2020-07-27 RX ADMIN — KETOROLAC TROMETHAMINE 30 MG: 30 INJECTION, SOLUTION INTRAMUSCULAR at 20:16

## 2020-07-27 RX ADMIN — HYDROMORPHONE HYDROCHLORIDE 2 MG: 2 TABLET ORAL at 03:04

## 2020-07-27 RX ADMIN — SODIUM CHLORIDE, POTASSIUM CHLORIDE, SODIUM LACTATE AND CALCIUM CHLORIDE: 600; 310; 30; 20 INJECTION, SOLUTION INTRAVENOUS at 19:25

## 2020-07-27 RX ADMIN — METHADONE HYDROCHLORIDE 5 MG: 5 TABLET ORAL at 08:05

## 2020-07-27 RX ADMIN — HYDROMORPHONE HYDROCHLORIDE 2 MG: 2 INJECTION, SOLUTION INTRAMUSCULAR; INTRAVENOUS; SUBCUTANEOUS at 21:42

## 2020-07-27 RX ADMIN — SODIUM CHLORIDE, POTASSIUM CHLORIDE, SODIUM LACTATE AND CALCIUM CHLORIDE: 600; 310; 30; 20 INJECTION, SOLUTION INTRAVENOUS at 11:18

## 2020-07-27 RX ADMIN — FOLIC ACID 1 MG: 1 TABLET ORAL at 08:06

## 2020-07-27 RX ADMIN — HYDROMORPHONE HYDROCHLORIDE 2 MG: 2 TABLET ORAL at 20:16

## 2020-07-27 RX ADMIN — HYDROMORPHONE HYDROCHLORIDE 2 MG: 2 TABLET ORAL at 11:21

## 2020-07-27 RX ADMIN — HYDROMORPHONE HYDROCHLORIDE 2 MG: 2 INJECTION, SOLUTION INTRAMUSCULAR; INTRAVENOUS; SUBCUTANEOUS at 17:11

## 2020-07-27 RX ADMIN — HYDROXYUREA 2000 MG: 500 CAPSULE ORAL at 08:18

## 2020-07-27 RX ADMIN — HYDROMORPHONE HYDROCHLORIDE 2 MG: 2 INJECTION, SOLUTION INTRAMUSCULAR; INTRAVENOUS; SUBCUTANEOUS at 08:17

## 2020-07-27 ASSESSMENT — PAIN DESCRIPTION - LOCATION
LOCATION: LEG

## 2020-07-27 ASSESSMENT — PAIN DESCRIPTION - PROGRESSION
CLINICAL_PROGRESSION: GRADUALLY IMPROVING

## 2020-07-27 ASSESSMENT — PAIN SCALES - GENERAL
PAINLEVEL_OUTOF10: 7
PAINLEVEL_OUTOF10: 8
PAINLEVEL_OUTOF10: 8
PAINLEVEL_OUTOF10: 7
PAINLEVEL_OUTOF10: 8
PAINLEVEL_OUTOF10: 7
PAINLEVEL_OUTOF10: 8
PAINLEVEL_OUTOF10: 5
PAINLEVEL_OUTOF10: 8
PAINLEVEL_OUTOF10: 7
PAINLEVEL_OUTOF10: 7
PAINLEVEL_OUTOF10: 8
PAINLEVEL_OUTOF10: 8
PAINLEVEL_OUTOF10: 6
PAINLEVEL_OUTOF10: 8
PAINLEVEL_OUTOF10: 7
PAINLEVEL_OUTOF10: 8
PAINLEVEL_OUTOF10: 9
PAINLEVEL_OUTOF10: 7
PAINLEVEL_OUTOF10: 9
PAINLEVEL_OUTOF10: 4
PAINLEVEL_OUTOF10: 8

## 2020-07-27 ASSESSMENT — PAIN DESCRIPTION - PAIN TYPE
TYPE: ACUTE PAIN
TYPE: ACUTE PAIN;CHRONIC PAIN
TYPE: ACUTE PAIN
TYPE: ACUTE PAIN;CHRONIC PAIN
TYPE: ACUTE PAIN
TYPE: ACUTE PAIN;CHRONIC PAIN
TYPE: ACUTE PAIN
TYPE: ACUTE PAIN;CHRONIC PAIN
TYPE: ACUTE PAIN
TYPE: ACUTE PAIN

## 2020-07-27 ASSESSMENT — PAIN DESCRIPTION - DESCRIPTORS
DESCRIPTORS: ACHING
DESCRIPTORS: ACHING
DESCRIPTORS: ACHING;CONSTANT

## 2020-07-27 ASSESSMENT — PAIN DESCRIPTION - ORIENTATION
ORIENTATION: RIGHT;LEFT

## 2020-07-27 ASSESSMENT — PAIN DESCRIPTION - FREQUENCY
FREQUENCY: CONTINUOUS

## 2020-07-27 ASSESSMENT — PAIN DESCRIPTION - ONSET
ONSET: ON-GOING

## 2020-07-27 NOTE — PLAN OF CARE
Problem: Pain:  Goal: Pain level will decrease  Description: Pain level will decrease  7/27/2020 1152 by Yovanny Mccarty RN  Outcome: Ongoing     Problem: Falls - Risk of:  Goal: Will remain free from falls  Description: Will remain free from falls  Outcome: Ongoing

## 2020-07-27 NOTE — PROGRESS NOTES
Hospitalist Progress Note      PCP: Andre Linda MD    Date of Admission: 7/25/2020    Chief Complaint:   Chief Complaint   Patient presents with    Sickle Cell Pain Crisis     joselyn lower leg pain for a couple days. Pt seen here a couple days ago. Hospital Course:   Sarabjit Mazariegso is a 23 y.o. male. He has a h/o sickle cell anemia. He has chronic lower extremity pain which waxes and wanes. Usually he says in mild flares pain stays at or below his knees. Lately pain has been creeping up into his thighs. He has oral hydromorphone available for use at home, but has been unable to keep up with the pain this way so comes in tonight for further care. This flare has been building for roughly a week. He visited the ER for the same complaint on 7/22. He otherwise feels ok. He denies rigors, sweats, anorexia, nausea, diarrhea. He states that sometimes when he takes a deep breath it feels as if he isn't really getting a deep breath. He denies outright dyspnea, wheezing, cough, pleuritic pain, and hemoptysis. Subjective:   No new medical concerns. Bedside rounding with nursing completed.     Medications:  Reviewed    Infusion Medications    lactated ringers 125 mL/hr at 07/27/20 1118     Scheduled Medications    methadone  5 mg Oral BID    ketorolac  30 mg Intravenous TID    hydroxyurea  2,000 mg Oral Daily    folic acid  1 mg Oral Daily    enoxaparin  40 mg Subcutaneous Daily     PRN Meds: HYDROmorphone, HYDROmorphone, sodium chloride flush, potassium chloride **OR** potassium alternative oral replacement **OR** potassium chloride, magnesium sulfate, acetaminophen, acetaminophen, acetaminophen, ondansetron, ondansetron, promethazine, promethazine, melatonin ER, albuterol      Intake/Output Summary (Last 24 hours) at 7/27/2020 1711  Last data filed at 7/27/2020 1459  Gross per 24 hour   Intake 3449.58 ml   Output 1400 ml   Net 2049.58 ml       Physical Exam Performed:    BP (!) 109/58 Pulse 82   Temp 98.5 °F (36.9 °C) (Oral)   Resp 16   Ht 5' 8\" (1.727 m)   Wt 185 lb 3 oz (84 kg)   SpO2 92%   BMI 28.16 kg/m²     General appearance: AA male in no apparent distress, appears stated age and cooperative. HEENT: Pupils equal, round, and reactive to light. Conjunctivae/corneas clear. Neck: Supple, with full range of motion. No jugular venous distention. Trachea midline. Respiratory:  Normal respiratory effort. Clear to auscultation, bilaterally without Rales/Wheezes/Rhonchi. Cardiovascular: Regular rate and rhythm with normal S1/S2 without murmurs, rubs or gallops. Abdomen: Soft, non-tender, non-distended with normal bowel sounds. Musculoskeletal: No clubbing, cyanosis or edema bilaterally. Full range of motion without deformity. Skin: Skin color, texture, turgor normal.  No rashes or lesions. Neurologic:  Neurovascularly intact without any focal sensory/motor deficits.  Cranial nerves: II-XII intact, grossly non-focal.  Psychiatric: Alert and oriented, thought content appropriate, normal insight  Capillary Refill: Brisk,< 3 seconds   Peripheral Pulses: +2 palpable, equal bilaterally       Labs:   Recent Labs     07/25/20 0325 07/26/20  0617 07/27/20  0555   WBC 16.6* 11.6* 11.2*   HGB 8.2* 7.5* 7.5*   HCT 22.7* 21.2* 21.4*    405 391     Recent Labs     07/25/20 0325 07/26/20  0617 07/27/20  0554    140 139   K 4.3 4.1 4.1    106 104   CO2 24 25 26   BUN 14 11 10   CREATININE 0.8* 0.7* 0.6*   CALCIUM 9.6 9.2 9.5     Recent Labs     07/25/20  0325 07/26/20  0617 07/27/20  0554   AST 34 21 25   ALT 13 11 9*   BILIDIR 0.3  --   --    BILITOT 5.2* 4.3* 5.4*   ALKPHOS 59 54 54     Urinalysis:      Lab Results   Component Value Date    NITRU Negative 03/14/2020    BLOODU Negative 03/14/2020    SPECGRAV 1.020 03/14/2020    GLUCOSEU Negative 03/14/2020       Radiology:  XR CHEST PORTABLE   Final Result   Subtle right infrahilar airspace opacity within the medial right lung base.             Assessment/Plan:    Active Hospital Problems    Diagnosis Date Noted    Sickle cell pain crisis ([facility] [address] [D57.00] 07/25/2020    Asthma [J45.909] 07/25/2020       Sickle Cell Pain Crisis, no evidence of acute chest syndrome:  -  Continue LR @ 125 mL/hr. Continue oral hydromorphone plus make IV available for breakthrough pain. -  Continue prn dilaudid, toradol.   -  Continue hydrea and folic acid. -  Hem/Onc following.      Asthma:  -  Asymptomatic at this time. -  Portable CXR suggested a subtle abnormality. No clinical evidence of a respiratory or other infectious process. -  Home albuterol available prn. DVT Prophylaxis: Lovenox  Diet: DIET GENERAL;  Code Status: Full Code    PT/OT Eval Status: Not needed given independent ambulatory status. Dispo - 1-2 days.      [facility], APRN - CNP

## 2020-07-27 NOTE — PROGRESS NOTES
CO2 24 25 26   BUN 14 11 10   CREATININE 0.8* 0.7* 0.6*     Recent Labs     07/25/20  0325 07/26/20  0617 07/27/20  0554   AST 34 21 25   ALT 13 11 9*   BILIDIR 0.3  --   --    BILITOT 5.2* 4.3* 5.4*   ALKPHOS 59 54 54       Magnesium:    Lab Results   Component Value Date    MG 2.00 07/27/2020    MG 1.90 07/26/2020         Problem List  Patient Active Problem List   Diagnosis    Sickle cell pain crisis ([facility] [address]    Asthma       ASSESSMENT AND PLAN:    Sickle cell crisis:  -No evidence of acute chest pain syndrome  -No mental status changes  -Cont Toradol   -Resume methadone 5 mg p.o. twice daily which he was taking as an outpatient- no changes today   -Continue PRN Dilaudid  -I did explain to the patient that upon discharge we could not increase his methadone and that he would need to see his pain specialist.  He has a pain contract with his pain specialist.     Sickle cell anemia:  -He is not on a transfusion protocol and gets admitted very infrequently  -He will continue his Hydrea  -Continue folic acid  - Does not want a blood transfusion today              ONCOLOGIC DISPOSITION:    Betsy Camacho CNP  [facility]   Please contact through [address]

## 2020-07-27 NOTE — PROGRESS NOTES
Leopoldo served Dr Dennie Goodell with pt request to have dilaudid increased to 2 mg iv states not getting pain relief and dr told him he could have it increased.  No new orders perfect served the

## 2020-07-27 NOTE — CARE COORDINATION
Chart reviewed by case manger. Triggered by observation status. Pt on C5 being evaluated for sickle cell crisis. CM spoke with patient at bedside, who denies needs. States he has f/u with PCP/hem-onc team and no difficulty with medications or transportation. Pt states family will transport home when he is ready for d/c. Pt denies needs from [facility] team.    Please consult CM team if needs arise.      Selina Cowan RN CM

## 2020-07-28 PROBLEM — D57.00 SICKLE CELL CRISIS (HCC): Status: ACTIVE | Noted: 2020-07-28

## 2020-07-28 LAB
A/G RATIO: 1.7 (ref 1.1–2.2)
ABO/RH: NORMAL
ALBUMIN SERPL-MCNC: 4 G/DL (ref 3.4–5)
ALP BLD-CCNC: 54 U/L (ref 40–129)
ALT SERPL-CCNC: 12 U/L (ref 10–40)
ANION GAP SERPL CALCULATED.3IONS-SCNC: 9 MMOL/L (ref 3–16)
ANISOCYTOSIS: ABNORMAL
ANTIBODY SCREEN: NORMAL
AST SERPL-CCNC: 29 U/L (ref 15–37)
BASOPHILS ABSOLUTE: 0.2 K/UL (ref 0–0.2)
BASOPHILS RELATIVE PERCENT: 1.4 %
BILIRUB SERPL-MCNC: 5.6 MG/DL (ref 0–1)
BUN BLDV-MCNC: 10 MG/DL (ref 7–20)
CALCIUM SERPL-MCNC: 9.4 MG/DL (ref 8.3–10.6)
CHLORIDE BLD-SCNC: 101 MMOL/L (ref 99–110)
CO2: 29 MMOL/L (ref 21–32)
CREAT SERPL-MCNC: 0.6 MG/DL (ref 0.9–1.3)
EOSINOPHILS ABSOLUTE: 0.3 K/UL (ref 0–0.6)
EOSINOPHILS RELATIVE PERCENT: 2.7 %
GFR AFRICAN AMERICAN: >60
GFR NON-AFRICAN AMERICAN: >60
GLOBULIN: 2.4 G/DL
GLUCOSE BLD-MCNC: 90 MG/DL (ref 70–99)
HCT VFR BLD CALC: 20.7 % (ref 40.5–52.5)
HEMATOLOGY PATH CONSULT: NO
HEMOGLOBIN: 7.5 G/DL (ref 13.5–17.5)
LYMPHOCYTES ABSOLUTE: 3.4 K/UL (ref 1–5.1)
LYMPHOCYTES RELATIVE PERCENT: 29.2 %
MAGNESIUM: 1.9 MG/DL (ref 1.8–2.4)
MCH RBC QN AUTO: 35.5 PG (ref 26–34)
MCHC RBC AUTO-ENTMCNC: 36.2 G/DL (ref 31–36)
MCV RBC AUTO: 98.1 FL (ref 80–100)
MONOCYTES ABSOLUTE: 0.9 K/UL (ref 0–1.3)
MONOCYTES RELATIVE PERCENT: 7.9 %
NEUTROPHILS ABSOLUTE: 6.9 K/UL (ref 1.7–7.7)
NEUTROPHILS RELATIVE PERCENT: 58.8 %
PDW BLD-RTO: 26.1 % (ref 12.4–15.4)
PLATELET # BLD: 412 K/UL (ref 135–450)
PLATELET SLIDE REVIEW: ADEQUATE
PMV BLD AUTO: 8.7 FL (ref 5–10.5)
POIKILOCYTES: ABNORMAL
POLYCHROMASIA: ABNORMAL
POTASSIUM SERPL-SCNC: 4.2 MMOL/L (ref 3.5–5.1)
RBC # BLD: 2.11 M/UL (ref 4.2–5.9)
SCHISTOCYTES: ABNORMAL
SICKLE CELLS: ABNORMAL
SLIDE REVIEW: ABNORMAL
SODIUM BLD-SCNC: 139 MMOL/L (ref 136–145)
TARGET CELLS: ABNORMAL
TOTAL PROTEIN: 6.4 G/DL (ref 6.4–8.2)
WBC # BLD: 11.7 K/UL (ref 4–11)

## 2020-07-28 PROCEDURE — G0378 HOSPITAL OBSERVATION PER HR: HCPCS

## 2020-07-28 PROCEDURE — 97535 SELF CARE MNGMENT TRAINING: CPT

## 2020-07-28 PROCEDURE — 86900 BLOOD TYPING SEROLOGIC ABO: CPT

## 2020-07-28 PROCEDURE — 96376 TX/PRO/DX INJ SAME DRUG ADON: CPT

## 2020-07-28 PROCEDURE — 86923 COMPATIBILITY TEST ELECTRIC: CPT

## 2020-07-28 PROCEDURE — 2580000003 HC RX 258: Performed by: INTERNAL MEDICINE

## 2020-07-28 PROCEDURE — 6360000002 HC RX W HCPCS: Performed by: INTERNAL MEDICINE

## 2020-07-28 PROCEDURE — 36415 COLL VENOUS BLD VENIPUNCTURE: CPT

## 2020-07-28 PROCEDURE — 6370000000 HC RX 637 (ALT 250 FOR IP): Performed by: INTERNAL MEDICINE

## 2020-07-28 PROCEDURE — 86901 BLOOD TYPING SEROLOGIC RH(D): CPT

## 2020-07-28 PROCEDURE — 36430 TRANSFUSION BLD/BLD COMPNT: CPT

## 2020-07-28 PROCEDURE — 85660 RBC SICKLE CELL TEST: CPT

## 2020-07-28 PROCEDURE — 97165 OT EVAL LOW COMPLEX 30 MIN: CPT

## 2020-07-28 PROCEDURE — 85025 COMPLETE CBC W/AUTO DIFF WBC: CPT

## 2020-07-28 PROCEDURE — 1200000000 HC SEMI PRIVATE

## 2020-07-28 PROCEDURE — 2580000003 HC RX 258: Performed by: NURSE PRACTITIONER

## 2020-07-28 PROCEDURE — 6370000000 HC RX 637 (ALT 250 FOR IP): Performed by: NURSE PRACTITIONER

## 2020-07-28 PROCEDURE — P9016 RBC LEUKOCYTES REDUCED: HCPCS

## 2020-07-28 PROCEDURE — 83735 ASSAY OF MAGNESIUM: CPT

## 2020-07-28 PROCEDURE — 80053 COMPREHEN METABOLIC PANEL: CPT

## 2020-07-28 PROCEDURE — 86850 RBC ANTIBODY SCREEN: CPT

## 2020-07-28 RX ORDER — ACETAMINOPHEN 325 MG/1
650 TABLET ORAL ONCE
Status: DISCONTINUED | OUTPATIENT
Start: 2020-07-28 | End: 2020-07-30 | Stop reason: HOSPADM

## 2020-07-28 RX ORDER — ACETAMINOPHEN 160 MG/5ML
650 SOLUTION ORAL EVERY 4 HOURS PRN
Status: DISCONTINUED | OUTPATIENT
Start: 2020-07-28 | End: 2020-07-30 | Stop reason: HOSPADM

## 2020-07-28 RX ORDER — OXYCODONE HYDROCHLORIDE 5 MG/1
10 TABLET ORAL EVERY 4 HOURS PRN
Status: DISCONTINUED | OUTPATIENT
Start: 2020-07-28 | End: 2020-07-30 | Stop reason: HOSPADM

## 2020-07-28 RX ORDER — OXYCODONE HYDROCHLORIDE 5 MG/1
5 TABLET ORAL EVERY 4 HOURS PRN
Status: DISCONTINUED | OUTPATIENT
Start: 2020-07-28 | End: 2020-07-30 | Stop reason: HOSPADM

## 2020-07-28 RX ORDER — DIPHENHYDRAMINE HCL 25 MG
25 TABLET ORAL ONCE
Status: COMPLETED | OUTPATIENT
Start: 2020-07-28 | End: 2020-07-28

## 2020-07-28 RX ORDER — 0.9 % SODIUM CHLORIDE 0.9 %
20 INTRAVENOUS SOLUTION INTRAVENOUS ONCE
Status: COMPLETED | OUTPATIENT
Start: 2020-07-28 | End: 2020-07-29

## 2020-07-28 RX ORDER — ACETAMINOPHEN 500 MG
1000 TABLET ORAL 4 TIMES DAILY
Status: DISCONTINUED | OUTPATIENT
Start: 2020-07-28 | End: 2020-07-30 | Stop reason: HOSPADM

## 2020-07-28 RX ORDER — METHADONE HYDROCHLORIDE 10 MG/1
10 TABLET ORAL EVERY 8 HOURS SCHEDULED
Status: DISCONTINUED | OUTPATIENT
Start: 2020-07-29 | End: 2020-07-30 | Stop reason: HOSPADM

## 2020-07-28 RX ADMIN — HYDROMORPHONE HYDROCHLORIDE 2 MG: 2 INJECTION, SOLUTION INTRAMUSCULAR; INTRAVENOUS; SUBCUTANEOUS at 20:18

## 2020-07-28 RX ADMIN — HYDROXYUREA 2000 MG: 500 CAPSULE ORAL at 08:27

## 2020-07-28 RX ADMIN — HYDROMORPHONE HYDROCHLORIDE 2 MG: 2 TABLET ORAL at 06:45

## 2020-07-28 RX ADMIN — Medication 10 ML: at 20:17

## 2020-07-28 RX ADMIN — DIPHENHYDRAMINE HCL 25 MG: 25 TABLET ORAL at 22:27

## 2020-07-28 RX ADMIN — FOLIC ACID 1 MG: 1 TABLET ORAL at 08:27

## 2020-07-28 RX ADMIN — KETOROLAC TROMETHAMINE 30 MG: 30 INJECTION, SOLUTION INTRAMUSCULAR at 14:54

## 2020-07-28 RX ADMIN — METHADONE HYDROCHLORIDE 5 MG: 5 TABLET ORAL at 20:17

## 2020-07-28 RX ADMIN — HYDROMORPHONE HYDROCHLORIDE 2 MG: 2 TABLET ORAL at 01:11

## 2020-07-28 RX ADMIN — HYDROMORPHONE HYDROCHLORIDE 2 MG: 2 INJECTION, SOLUTION INTRAMUSCULAR; INTRAVENOUS; SUBCUTANEOUS at 22:28

## 2020-07-28 RX ADMIN — SODIUM CHLORIDE, POTASSIUM CHLORIDE, SODIUM LACTATE AND CALCIUM CHLORIDE: 600; 310; 30; 20 INJECTION, SOLUTION INTRAVENOUS at 12:47

## 2020-07-28 RX ADMIN — SODIUM CHLORIDE, POTASSIUM CHLORIDE, SODIUM LACTATE AND CALCIUM CHLORIDE: 600; 310; 30; 20 INJECTION, SOLUTION INTRAVENOUS at 03:31

## 2020-07-28 RX ADMIN — HYDROMORPHONE HYDROCHLORIDE 2 MG: 2 TABLET ORAL at 13:18

## 2020-07-28 RX ADMIN — HYDROMORPHONE HYDROCHLORIDE 2 MG: 2 INJECTION, SOLUTION INTRAMUSCULAR; INTRAVENOUS; SUBCUTANEOUS at 15:20

## 2020-07-28 RX ADMIN — ACETAMINOPHEN 1000 MG: 500 TABLET ORAL at 22:29

## 2020-07-28 RX ADMIN — HYDROMORPHONE HYDROCHLORIDE 2 MG: 2 INJECTION, SOLUTION INTRAMUSCULAR; INTRAVENOUS; SUBCUTANEOUS at 09:30

## 2020-07-28 RX ADMIN — KETOROLAC TROMETHAMINE 30 MG: 30 INJECTION, SOLUTION INTRAMUSCULAR at 08:27

## 2020-07-28 RX ADMIN — METHADONE HYDROCHLORIDE 5 MG: 5 TABLET ORAL at 08:27

## 2020-07-28 RX ADMIN — KETOROLAC TROMETHAMINE 30 MG: 30 INJECTION, SOLUTION INTRAMUSCULAR at 20:17

## 2020-07-28 RX ADMIN — HYDROMORPHONE HYDROCHLORIDE 2 MG: 2 INJECTION, SOLUTION INTRAMUSCULAR; INTRAVENOUS; SUBCUTANEOUS at 02:24

## 2020-07-28 RX ADMIN — SODIUM CHLORIDE 20 ML: 9 INJECTION, SOLUTION INTRAVENOUS at 22:28

## 2020-07-28 RX ADMIN — SODIUM CHLORIDE, POTASSIUM CHLORIDE, SODIUM LACTATE AND CALCIUM CHLORIDE: 600; 310; 30; 20 INJECTION, SOLUTION INTRAVENOUS at 20:17

## 2020-07-28 RX ADMIN — HYDROMORPHONE HYDROCHLORIDE 2 MG: 2 TABLET ORAL at 17:51

## 2020-07-28 RX ADMIN — OXYCODONE 10 MG: 5 TABLET ORAL at 22:28

## 2020-07-28 ASSESSMENT — PAIN DESCRIPTION - ONSET
ONSET: ON-GOING

## 2020-07-28 ASSESSMENT — PAIN SCALES - GENERAL
PAINLEVEL_OUTOF10: 8
PAINLEVEL_OUTOF10: 9
PAINLEVEL_OUTOF10: 8
PAINLEVEL_OUTOF10: 6
PAINLEVEL_OUTOF10: 8
PAINLEVEL_OUTOF10: 8
PAINLEVEL_OUTOF10: 6
PAINLEVEL_OUTOF10: 10
PAINLEVEL_OUTOF10: 7
PAINLEVEL_OUTOF10: 6
PAINLEVEL_OUTOF10: 9
PAINLEVEL_OUTOF10: 7
PAINLEVEL_OUTOF10: 8
PAINLEVEL_OUTOF10: 9
PAINLEVEL_OUTOF10: 8
PAINLEVEL_OUTOF10: 6
PAINLEVEL_OUTOF10: 8

## 2020-07-28 ASSESSMENT — PAIN DESCRIPTION - ORIENTATION
ORIENTATION: RIGHT;LEFT

## 2020-07-28 ASSESSMENT — PAIN DESCRIPTION - PAIN TYPE
TYPE: ACUTE PAIN
TYPE: ACUTE PAIN
TYPE: CHRONIC PAIN
TYPE: ACUTE PAIN
TYPE: ACUTE PAIN;CHRONIC PAIN
TYPE: ACUTE PAIN

## 2020-07-28 ASSESSMENT — PAIN DESCRIPTION - FREQUENCY
FREQUENCY: CONTINUOUS

## 2020-07-28 ASSESSMENT — PAIN DESCRIPTION - LOCATION
LOCATION: LEG

## 2020-07-28 ASSESSMENT — PAIN DESCRIPTION - PROGRESSION
CLINICAL_PROGRESSION: GRADUALLY IMPROVING
CLINICAL_PROGRESSION: NOT CHANGED

## 2020-07-28 ASSESSMENT — PAIN - FUNCTIONAL ASSESSMENT
PAIN_FUNCTIONAL_ASSESSMENT: ACTIVITIES ARE NOT PREVENTED
PAIN_FUNCTIONAL_ASSESSMENT: ACTIVITIES ARE NOT PREVENTED

## 2020-07-28 ASSESSMENT — PAIN DESCRIPTION - DESCRIPTORS
DESCRIPTORS: ACHING
DESCRIPTORS: ACHING;DISCOMFORT
DESCRIPTORS: ACHING
DESCRIPTORS: ACHING;CONSTANT
DESCRIPTORS: ACHING

## 2020-07-28 NOTE — PROGRESS NOTES
ONCOLOGY HEMATOLOGY CARE PROGRESS NOTE      SUBJECTIVE:     Hgb 7.5 and HCT 20. He is not feeling well this AM. Agreeable to a blood transfusion. ROS:   The remaining 10 point review of symptoms is unremarkable. OBJECTIVE        Physical    VITALS:  /76   Pulse 71   Temp 97.9 °F (36.6 °C) (Oral)   Resp 16   Ht 5' 8\" (1.727 m)   Wt 183 lb 12.8 oz (83.4 kg)   SpO2 94%   BMI 27.95 kg/m²   TEMPERATURE:  Current - Temp: 97.9 °F (36.6 °C); Max - Temp  Av.3 °F (36.8 °C)  Min: 97.9 °F (36.6 °C)  Max: 98.6 °F (37 °C)  PULSE OXIMETRY RANGE: SpO2  Av.3 %  Min: 91 %  Max: 94 %  24HR INTAKE/OUTPUT:      Intake/Output Summary (Last 24 hours) at 2020 1058  Last data filed at 2020 1051  Gross per 24 hour   Intake 315 ml   Output 2950 ml   Net -2635 ml       CONSTITUTIONAL:  awake, alert, cooperative, no apparent distress, HEENT oral pharynx , no scleral icterus  HEMATOLOGIC/LYMPHATICS:  no cervical lymphadenopathy, no supraclavicular lymphadenopathy, no axillary lymphadenopathy and no inguinal lymphadenopathy  LUNGS:  No increased work of breathing, good air exchange, clear to auscultation bilaterally, no crackles or wheezing  CARDIOVASCULAR:  , regular rate and rhythm, normal S1 and S2, no S3 or S4, and no murmur noted  ABDOMEN:  No scars, normal bowel sounds, soft, non-distended, non-tender, no masses palpated, no hepatosplenomegally  MUSCULOSKELETAL:  There is no redness, warmth, or swelling of the joints. EXTREMETIES: No clubbing cynosis or edema  NEUROLOGIC:  Awake, alert, oriented to name, place and time. Cranial nerves II-XII are grossly intact. Motor is 5 out of 5 bilaterally.    SKIN:  no bruising or bleeding      Data      Recent Labs     20  0617 20  0555 20  0730   WBC 11.6* 11.2* 11.7*   HGB 7.5* 7.5* 7.5*   HCT 21.2* 21.4* 20.7*    391 412   MCV 97.1 99.7 98.1        Recent Labs     20  0617 20  0554 07/28/20  0730    139 139   K 4.1 4.1 4.2    104 101   CO2 25 26 29   BUN 11 10 10   CREATININE 0.7* 0.6* 0.6*     Recent Labs     07/26/20  0617 07/27/20  0554 07/28/20  0730   AST 21 25 29   ALT 11 9* 12   BILITOT 4.3* 5.4* 5.6*   ALKPHOS 54 54 54       Magnesium:    Lab Results   Component Value Date    MG 1.90 07/28/2020    MG 2.00 07/27/2020    MG 1.90 07/26/2020         Problem List  Patient Active Problem List   Diagnosis    Sickle cell pain crisis ([facility] [address]    Asthma       ASSESSMENT AND PLAN:    Sickle cell crisis:  -No evidence of acute chest pain syndrome  -No mental status changes  -Cont Toradol   -Resume methadone 5 mg p.o. twice daily which he was taking as an outpatient- no changes today   -Continue PRN Dilaudid  -I did explain to the patient that upon discharge we could not increase his methadone and that he would need to see his pain specialist.  He has a pain contract with his pain specialist.     Sickle cell anemia:  -He is not on a transfusion protocol and gets admitted very infrequently  -He will continue his Hydrea  -Continue folic acid  - Transfuse 1 unit PRBC today  - Ambulate in halls- PT/OT eval              ONCOLOGIC DISPOSITION:    Bairon Almeida CNP  [facility]   Please contact through [address]

## 2020-07-28 NOTE — PROGRESS NOTES
Physical Therapy  PT met with pt this afternoon after pt returned from using the bathroom independently. Pt made no eye contact and minimal communication but stated he did not need PT services. PT inquired if crutches or a walker would help him with ambulation and pt stated he was crutches at home he could use if needed. RN confirms pt has been ind with mobility in room, just moving slow due to pain. No PT services indicated at this time. No charge.      Dalia Rivas, PT, DPT

## 2020-07-28 NOTE — PROGRESS NOTES
Hospitalist Progress Note      PCP: Maribel Pandya MD    Date of Admission: 7/25/2020    Chief Complaint:   Chief Complaint   Patient presents with    Sickle Cell Pain Crisis     joselyn lower leg pain for a couple days. Pt seen here a couple days ago. Hospital Course:   Portia Zapien is a 23 y.o. male. He has a h/o sickle cell anemia. He has chronic lower extremity pain which waxes and wanes. Usually he says in mild flares pain stays at or below his knees. Lately pain has been creeping up into his thighs. He has oral hydromorphone available for use at home, but has been unable to keep up with the pain this way so comes in tonight for further care. This flare has been building for roughly a week. He visited the ER for the same complaint on 7/22. He otherwise feels ok. He denies rigors, sweats, anorexia, nausea, diarrhea. He states that sometimes when he takes a deep breath it feels as if he isn't really getting a deep breath. He denies outright dyspnea, wheezing, cough, pleuritic pain, and hemoptysis. Subjective:   No new medical concerns. Bedside rounding with nursing completed.     Medications:  Reviewed    Infusion Medications    lactated ringers 125 mL/hr at 07/28/20 1247     Scheduled Medications    sodium chloride  20 mL Intravenous Once    acetaminophen  650 mg Oral Once    diphenhydrAMINE  25 mg Oral Once    methadone  5 mg Oral BID    ketorolac  30 mg Intravenous TID    hydroxyurea  2,000 mg Oral Daily    folic acid  1 mg Oral Daily    enoxaparin  40 mg Subcutaneous Daily     PRN Meds: HYDROmorphone, HYDROmorphone, sodium chloride flush, potassium chloride **OR** potassium alternative oral replacement **OR** potassium chloride, magnesium sulfate, acetaminophen, acetaminophen, acetaminophen, ondansetron, ondansetron, promethazine, promethazine, melatonin ER, albuterol      Intake/Output Summary (Last 24 hours) at 7/28/2020 1506  Last data filed at 7/28/2020 1437  Gross per 24 hour   Intake 480 ml   Output 2650 ml   Net -2170 ml       Physical Exam Performed:    /75   Pulse 75   Temp 98.5 °F (36.9 °C)   Resp 14   Ht 5' 8\" (1.727 m)   Wt 183 lb 12.8 oz (83.4 kg)   SpO2 93%   BMI 27.95 kg/m²     General appearance: AA male in no apparent distress, appears stated age and cooperative. HEENT: Pupils equal, round, and reactive to light. Conjunctivae/corneas clear. Neck: Supple, with full range of motion. No jugular venous distention. Trachea midline. Respiratory:  Normal respiratory effort. Clear to auscultation, bilaterally without Rales/Wheezes/Rhonchi. Cardiovascular: Regular rate and rhythm with normal S1/S2 without murmurs, rubs or gallops. Abdomen: Soft, non-tender, non-distended with normal bowel sounds. Musculoskeletal: No clubbing, cyanosis or edema bilaterally. Full range of motion without deformity. Skin: Skin color, texture, turgor normal.  No rashes or lesions. Neurologic:  Neurovascularly intact without any focal sensory/motor deficits.  Cranial nerves: II-XII intact, grossly non-focal.  Psychiatric: Alert and oriented, thought content appropriate, normal insight  Capillary Refill: Brisk,< 3 seconds   Peripheral Pulses: +2 palpable, equal bilaterally       Labs:   Recent Labs     07/26/20  0617 07/27/20  0555 07/28/20  0730   WBC 11.6* 11.2* 11.7*   HGB 7.5* 7.5* 7.5*   HCT 21.2* 21.4* 20.7*    391 412     Recent Labs     07/26/20  0617 07/27/20  0554 07/28/20  0730    139 139   K 4.1 4.1 4.2    104 101   CO2 25 26 29   BUN 11 10 10   CREATININE 0.7* 0.6* 0.6*   CALCIUM 9.2 9.5 9.4     Recent Labs     07/26/20  0617 07/27/20  0554 07/28/20  0730   AST 21 25 29   ALT 11 9* 12   BILITOT 4.3* 5.4* 5.6*   ALKPHOS 54 54 54     Urinalysis:      Lab Results   Component Value Date    NITRU Negative 03/14/2020    BLOODU Negative 03/14/2020    SPECGRAV 1.020 03/14/2020    GLUCOSEU Negative 03/14/2020       Radiology:  XR CHEST

## 2020-07-28 NOTE — PROGRESS NOTES
Occupational Therapy   Occupational Therapy Initial Assessment and Discharge Summary  Date: 2020   Patient Name: Dahlia Bosworth  MRN: [id number]     : 2000    Date of Service: 2020    Discharge Recommendations:  Home with assist PRN  OT Equipment Recommendations  Equipment Needed: No    Assessment   Performance deficits / Impairments: Decreased functional mobility ; Decreased ADL status; Decreased balance;Decreased high-level IADLs  Assessment: Pt reports typically independent with BADL, most IADL, functional mobility/transfers with BLE pain affecting functional performance slightly in these areas when experiencing. Pt presented grossly at Mod I for functional mobility/transfers, and ADL tasks observed at time of eval. Pt denied further OT needs or need for mobility device. Prognosis: Good  Decision Making: Low Complexity  OT Education: OT Role;Plan of Care;Equipment;Precautions; ADL Adaptive Strategies;Transfer Training;Energy Conservation  Patient Education: importance of mobility  REQUIRES OT FOLLOW UP: No  Activity Tolerance  Activity Tolerance: Patient limited by fatigue;Patient limited by pain  Safety Devices  Safety Devices in place: Yes  Type of devices: Nurse notified;Call light within reach(pt left in bathroom for toileting - per RN yue)           Patient Diagnosis(es): The primary encounter diagnosis was Sickle cell pain crisis ([facility] [address] A diagnosis of Pneumonia due to organism was also pertinent to this visit. has a past medical history of Asthma, Enlarged heart, and Sickle cell anemia ([facility] [address] has a past surgical history that includes Splenectomy.            Restrictions  Restrictions/Precautions  Restrictions/Precautions: General Precautions, Fall Risk    Subjective   General  Chart Reviewed: Yes  Patient assessed for rehabilitation services?: Yes  Family / Caregiver Present: No  Referring Practitioner: CARMELITA Olmos CNP  Diagnosis: Sickle cell crisis  Subjective  Subjective: Pt in bed on arrival, flat affect, not resistant to eval and treat  General Comment  Comments: Per RN okay to treat, reporting pt has been IND with mobility in room  Patient Currently in Pain: Yes  Pain Assessment  Pain Assessment: 0-10  Pain Level: 8  Patient's Stated Pain Goal: No pain  Pain Type: Acute pain  Pain Location: Leg  Pain Orientation: Right;Left  Pain Descriptors: Aching;Constant  Pain Frequency: Continuous  Clinical Progression: Gradually improving  Non-Pharmaceutical Pain Intervention(s): Repositioned; Ambulation/Increased Activity; Rest  Response to Pain Intervention: Patient Satisfied  Vital Signs  Temp: 98.5 °F (36.9 °C)  Temp Source: Oral  Pulse: 75  Heart Rate Source: Monitor  Resp: 14  BP: 137/75  BP Location: Left upper arm  BP Upper/Lower: Upper  MAP (mmHg): 95  Patient Position: Sitting  Patient Currently in Pain: Yes  Oxygen Therapy  SpO2: 93 %  O2 Device: None (Room air)  Social/Functional History  Social/Functional History  Lives With: Family(parents, grandmother)  Home Layout: Two level, Bed/Bath upstairs  Home Access: Stairs to enter without rails  Entrance Stairs - Number of Steps: 2  Bathroom Shower/Tub: Tub/Shower unit  Bathroom Toilet: Standard  Home Equipment: Crutches  ADL Assistance: Independent  Homemaking Assistance: Independent  Homemaking Responsibilities: Yes  Ambulation Assistance: Independent(without device typically, furniture walks at times experiencing BLE pain)  Transfer Assistance: Independent  Active : No  Patient's  Info: family  Occupation: Full time employment  Type of occupation: [employer] at [address]: Impaired  Vision Exceptions: Wears glasses at all times  Hearing: Within functional limits    Orientation  Overall Orientation Status: Within Normal Limits     Balance  Sitting Balance: Independent  Standing Balance: Modified independent   Standing Balance  Time: 1-2 minutes  Activity: mobility, standing ADL  Functional Mobility  Functional - Mobility Device: No device  Activity: To/from bathroom  Assist Level: Modified independent (increased time to complete)  Toilet Transfers  Toilet - Technique: Ambulating  Equipment Used: Standard toilet  Toilet Transfer: Modified independent  ADL  Feeding: Independent  Grooming: Independent  Toileting: Independent        Bed mobility  Supine to Sit: Modified independent(HOB elevated)  Sit to Supine: (pt left in bathroom d/t requesting increased time to complete toileting - RN aware)  Transfers  Sit to stand: Modified independent  Stand to sit: Modified independent     Cognition  Overall Cognitive Status: Exceptions(flat affect)  Arousal/Alertness: Delayed responses to stimuli  Problem Solving: Assistance required to generate solutions  Initiation: Requires cues for some        Sensation  Overall Sensation Status: WFL        LUE AROM (degrees)  LUE AROM : WFL  Left Hand AROM (degrees)  Left Hand AROM: WFL  RUE AROM (degrees)  RUE AROM : WFL  Right Hand AROM (degrees)  Right Hand AROM: WFL          Goals  Short term goals  Time Frame for Short term goals: 1 time visit  Short term goal 1: Pt will complete functional transfers with Supervision or better - GOAL MET  Short term goal 2: Pt will demonstrate ability to complete toileting with setup - GOAL MET  Patient Goals   Patient goals : \"to go home\"       Therapy Time   Individual Concurrent Group Co-treatment   Time In 1355(10 minutes for eval)         Time Out 1420         Minutes 25         Timed Code Treatment Minutes: 15 Minutes       Marsha Maldonado, OTR/L

## 2020-07-29 LAB
A/G RATIO: 1.7 (ref 1.1–2.2)
ALBUMIN SERPL-MCNC: 4 G/DL (ref 3.4–5)
ALP BLD-CCNC: 49 U/L (ref 40–129)
ALT SERPL-CCNC: 18 U/L (ref 10–40)
ANION GAP SERPL CALCULATED.3IONS-SCNC: 12 MMOL/L (ref 3–16)
AST SERPL-CCNC: 40 U/L (ref 15–37)
BILIRUB SERPL-MCNC: 6.1 MG/DL (ref 0–1)
BLOOD CULTURE, ROUTINE: NORMAL
BUN BLDV-MCNC: 10 MG/DL (ref 7–20)
CALCIUM SERPL-MCNC: 9.5 MG/DL (ref 8.3–10.6)
CHLORIDE BLD-SCNC: 104 MMOL/L (ref 99–110)
CO2: 25 MMOL/L (ref 21–32)
CREAT SERPL-MCNC: 0.7 MG/DL (ref 0.9–1.3)
GFR AFRICAN AMERICAN: >60
GFR NON-AFRICAN AMERICAN: >60
GLOBULIN: 2.3 G/DL
GLUCOSE BLD-MCNC: 82 MG/DL (ref 70–99)
MAGNESIUM: 2 MG/DL (ref 1.8–2.4)
POTASSIUM SERPL-SCNC: 4.7 MMOL/L (ref 3.5–5.1)
SODIUM BLD-SCNC: 141 MMOL/L (ref 136–145)
TOTAL PROTEIN: 6.3 G/DL (ref 6.4–8.2)

## 2020-07-29 PROCEDURE — 1200000000 HC SEMI PRIVATE

## 2020-07-29 PROCEDURE — 6370000000 HC RX 637 (ALT 250 FOR IP): Performed by: INTERNAL MEDICINE

## 2020-07-29 PROCEDURE — 80053 COMPREHEN METABOLIC PANEL: CPT

## 2020-07-29 PROCEDURE — 83735 ASSAY OF MAGNESIUM: CPT

## 2020-07-29 PROCEDURE — 36415 COLL VENOUS BLD VENIPUNCTURE: CPT

## 2020-07-29 PROCEDURE — 6360000002 HC RX W HCPCS: Performed by: NURSE PRACTITIONER

## 2020-07-29 PROCEDURE — 6360000002 HC RX W HCPCS: Performed by: INTERNAL MEDICINE

## 2020-07-29 PROCEDURE — 2580000003 HC RX 258: Performed by: INTERNAL MEDICINE

## 2020-07-29 RX ORDER — KETOROLAC TROMETHAMINE 30 MG/ML
30 INJECTION, SOLUTION INTRAMUSCULAR; INTRAVENOUS ONCE
Status: COMPLETED | OUTPATIENT
Start: 2020-07-29 | End: 2020-07-29

## 2020-07-29 RX ADMIN — METHADONE HYDROCHLORIDE 10 MG: 10 TABLET ORAL at 22:19

## 2020-07-29 RX ADMIN — ACETAMINOPHEN 1000 MG: 500 TABLET ORAL at 13:15

## 2020-07-29 RX ADMIN — Medication 10 ML: at 15:41

## 2020-07-29 RX ADMIN — Medication 10 ML: at 14:25

## 2020-07-29 RX ADMIN — OXYCODONE 10 MG: 5 TABLET ORAL at 10:19

## 2020-07-29 RX ADMIN — KETOROLAC TROMETHAMINE 30 MG: 30 INJECTION, SOLUTION INTRAMUSCULAR at 18:09

## 2020-07-29 RX ADMIN — FOLIC ACID 1 MG: 1 TABLET ORAL at 08:45

## 2020-07-29 RX ADMIN — HYDROMORPHONE HYDROCHLORIDE 1 MG: 1 INJECTION, SOLUTION INTRAMUSCULAR; INTRAVENOUS; SUBCUTANEOUS at 11:14

## 2020-07-29 RX ADMIN — OXYCODONE 10 MG: 5 TABLET ORAL at 23:39

## 2020-07-29 RX ADMIN — OXYCODONE 10 MG: 5 TABLET ORAL at 06:03

## 2020-07-29 RX ADMIN — HYDROXYUREA 2000 MG: 500 CAPSULE ORAL at 08:47

## 2020-07-29 RX ADMIN — METHADONE HYDROCHLORIDE 10 MG: 10 TABLET ORAL at 06:03

## 2020-07-29 RX ADMIN — Medication 10 ML: at 19:46

## 2020-07-29 RX ADMIN — OXYCODONE 10 MG: 5 TABLET ORAL at 14:46

## 2020-07-29 RX ADMIN — ACETAMINOPHEN 1000 MG: 500 TABLET ORAL at 08:45

## 2020-07-29 RX ADMIN — ACETAMINOPHEN 1000 MG: 500 TABLET ORAL at 17:15

## 2020-07-29 RX ADMIN — HYDROMORPHONE HYDROCHLORIDE 1 MG: 1 INJECTION, SOLUTION INTRAMUSCULAR; INTRAVENOUS; SUBCUTANEOUS at 19:46

## 2020-07-29 RX ADMIN — OXYCODONE 10 MG: 5 TABLET ORAL at 18:40

## 2020-07-29 RX ADMIN — SODIUM CHLORIDE, POTASSIUM CHLORIDE, SODIUM LACTATE AND CALCIUM CHLORIDE: 600; 310; 30; 20 INJECTION, SOLUTION INTRAVENOUS at 05:41

## 2020-07-29 RX ADMIN — HYDROMORPHONE HYDROCHLORIDE 2 MG: 2 INJECTION, SOLUTION INTRAMUSCULAR; INTRAVENOUS; SUBCUTANEOUS at 06:48

## 2020-07-29 RX ADMIN — Medication 10 ML: at 18:09

## 2020-07-29 RX ADMIN — HYDROMORPHONE HYDROCHLORIDE 1 MG: 1 INJECTION, SOLUTION INTRAMUSCULAR; INTRAVENOUS; SUBCUTANEOUS at 15:40

## 2020-07-29 RX ADMIN — METHADONE HYDROCHLORIDE 10 MG: 10 TABLET ORAL at 13:46

## 2020-07-29 ASSESSMENT — PAIN SCALES - GENERAL
PAINLEVEL_OUTOF10: 9
PAINLEVEL_OUTOF10: 7
PAINLEVEL_OUTOF10: 7
PAINLEVEL_OUTOF10: 8
PAINLEVEL_OUTOF10: 8
PAINLEVEL_OUTOF10: 9
PAINLEVEL_OUTOF10: 8
PAINLEVEL_OUTOF10: 10
PAINLEVEL_OUTOF10: 8
PAINLEVEL_OUTOF10: 10
PAINLEVEL_OUTOF10: 8
PAINLEVEL_OUTOF10: 7
PAINLEVEL_OUTOF10: 7
PAINLEVEL_OUTOF10: 8
PAINLEVEL_OUTOF10: 7
PAINLEVEL_OUTOF10: 8
PAINLEVEL_OUTOF10: 7

## 2020-07-29 ASSESSMENT — PAIN DESCRIPTION - LOCATION
LOCATION: KNEE
LOCATION: KNEE;LEG

## 2020-07-29 ASSESSMENT — PAIN DESCRIPTION - DESCRIPTORS: DESCRIPTORS: ACHING

## 2020-07-29 ASSESSMENT — PAIN DESCRIPTION - ORIENTATION
ORIENTATION: LEFT;RIGHT
ORIENTATION: LEFT;RIGHT

## 2020-07-29 ASSESSMENT — PAIN DESCRIPTION - FREQUENCY: FREQUENCY: CONTINUOUS

## 2020-07-29 ASSESSMENT — PAIN DESCRIPTION - PROGRESSION
CLINICAL_PROGRESSION: GRADUALLY IMPROVING

## 2020-07-29 ASSESSMENT — PAIN DESCRIPTION - PAIN TYPE
TYPE: ACUTE PAIN;CHRONIC PAIN
TYPE: CHRONIC PAIN

## 2020-07-29 NOTE — CARE COORDINATION
Chart review completed. Patient a 23year old male, admitted for Sickle Cell Crisis. Currently on C5. CM met with patient previously and  denies needs. States he has a pcp/hem-onc team he follows with and will do so at discharge. Please advise of any needs as they arise.  Frances Atkinson RN

## 2020-07-29 NOTE — PLAN OF CARE
Problem: Pain:  Goal: Pain level will decrease  Description: Pain level will decrease  7/28/2020 1622 by Dominick Patino RN  Outcome: Ongoing   Pt has Dilaudid, Toradol and methadone for pain control, call light in reach. Pain control satisfactory. Problem: Falls - Risk of:  Goal: Will remain free from falls  Description: Will remain free from falls  7/29/2020 0203 by Ira Cordero RN  Outcome: Ongoing  7/28/2020 1622 by Dominick Patino RN  Outcome: Ongoing   Fall precautions in place, bed alarm on, nonskid foot wear applied, bed in lowest position, and call light within reach. Will continue to monitor.

## 2020-07-29 NOTE — PROGRESS NOTES
Hospitalist Progress Note      PCP: Anselmo Lo MD    Date of Admission: 7/25/2020    Chief Complaint:   Chief Complaint   Patient presents with    Sickle Cell Pain Crisis     joselyn lower leg pain for a couple days. Pt seen here a couple days ago. Hospital Course:   Rojas Madera is a 23 y.o. male. He has a h/o sickle cell anemia. He has chronic lower extremity pain which waxes and wanes. Usually he says in mild flares pain stays at or below his knees. Lately pain has been creeping up into his thighs. He has oral hydromorphone available for use at home, but has been unable to keep up with the pain this way so comes in tonight for further care. This flare has been building for roughly a week. He visited the ER for the same complaint on 7/22. He otherwise feels ok. He denies rigors, sweats, anorexia, nausea, diarrhea. He states that sometimes when he takes a deep breath it feels as if he isn't really getting a deep breath. He denies outright dyspnea, wheezing, cough, pleuritic pain, and hemoptysis. Subjective:   No new medical concerns. Bedside rounding with nursing completed.     Medications:  Reviewed    Infusion Medications    lactated ringers 125 mL/hr at 07/29/20 0541     Scheduled Medications    acetaminophen  650 mg Oral Once    acetaminophen  1,000 mg Oral 4x Daily    methadone  10 mg Oral 3 times per day    hydroxyurea  2,000 mg Oral Daily    folic acid  1 mg Oral Daily    enoxaparin  40 mg Subcutaneous Daily     PRN Meds: HYDROmorphone, acetaminophen, oxyCODONE **OR** oxyCODONE, sodium chloride flush, potassium chloride **OR** potassium alternative oral replacement **OR** potassium chloride, magnesium sulfate, acetaminophen, acetaminophen, ondansetron, ondansetron, promethazine, promethazine, melatonin ER, albuterol      Intake/Output Summary (Last 24 hours) at 7/29/2020 1348  Last data filed at 7/29/2020 1157  Gross per 24 hour   Intake 7366.75 ml   Output 3700 ml   Net 3666.75 ml       Physical Exam Performed:    /64   Pulse 74   Temp 97.4 °F (36.3 °C) (Axillary)   Resp 18   Ht 5' 8\" (1.727 m)   Wt 182 lb 9.6 oz (82.8 kg)   SpO2 93%   BMI 27.76 kg/m²     General appearance: AA male in no apparent distress, appears stated age and cooperative. HEENT: Pupils equal, round, and reactive to light. Conjunctivae/corneas clear. Neck: Supple, with full range of motion. No jugular venous distention. Trachea midline. Respiratory:  Normal respiratory effort. Clear to auscultation, bilaterally without Rales/Wheezes/Rhonchi. Cardiovascular: Regular rate and rhythm with normal S1/S2 without murmurs, rubs or gallops. Abdomen: Soft, non-tender, non-distended with normal bowel sounds. Musculoskeletal: No clubbing, cyanosis or edema bilaterally. Full range of motion without deformity. Skin: Skin color, texture, turgor normal.  No rashes or lesions. Neurologic:  Neurovascularly intact without any focal sensory/motor deficits.  Cranial nerves: II-XII intact, grossly non-focal.  Psychiatric: Alert and oriented, thought content appropriate, normal insight  Capillary Refill: Brisk,< 3 seconds   Peripheral Pulses: +2 palpable, equal bilaterally       Labs:   Recent Labs     07/27/20  0555 07/28/20  0730   WBC 11.2* 11.7*   HGB 7.5* 7.5*   HCT 21.4* 20.7*    412     Recent Labs     07/27/20  0554 07/28/20  0730 07/29/20  0551    139 141   K 4.1 4.2 4.7    101 104   CO2 26 29 25   BUN 10 10 10   CREATININE 0.6* 0.6* 0.7*   CALCIUM 9.5 9.4 9.5     Recent Labs     07/27/20  0554 07/28/20  0730 07/29/20  0551   AST 25 29 40*   ALT 9* 12 18   BILITOT 5.4* 5.6* 6.1*   ALKPHOS 54 54 49     Urinalysis:      Lab Results   Component Value Date    NITRU Negative 03/14/2020    BLOODU Negative 03/14/2020    SPECGRAV 1.020 03/14/2020    GLUCOSEU Negative 03/14/2020       Radiology:  XR CHEST PORTABLE   Final Result   Subtle right infrahilar airspace opacity within the medial right lung base. Assessment/Plan:    Active Hospital Problems    Diagnosis Date Noted    Sickle cell crisis ([facility] [address] [D57.00] 07/28/2020    Sickle cell pain crisis ([facility] [address] [D57.00] 07/25/2020    Asthma [J45.909] 07/25/2020       Sickle cell pain crisis, no evidence of acute chest syndrome:  - Continue IV fluids.    - Continue prn analgesia. Defer pain management to Hematology. - Continue hydrea and folic acid. - S/p 1 unit PRBC on 7/28.     Asthma:  - Asymptomatic at this time. - Portable CXR suggested a subtle abnormality. No clinical evidence of a respiratory or other infectious process. - Home albuterol available prn. DVT Prophylaxis: Lovenox  Diet: DIET GENERAL;  Code Status: Full Code    PT/OT Eval Status: Ordered, pt declined therapy. Dispo - Home tomorrow 7/30 if okay with Hematology.      [facility], APRN - CNP

## 2020-07-29 NOTE — PROGRESS NOTES
Pt complaining of uncontrolled pain and requesting the doctor to be called. Dr. Kathleen Blanton sent perfect serve message. Waiting for response.

## 2020-07-29 NOTE — PROGRESS NOTES
Pt ambulated about 50 feet to the vending machine with assistance, C/O intense pain in the legs, could not walk back to the room, staff brought him back via wheel chair, Paged MD per pt's request for pain control, MD increased his Methadone and added Oxycodone.

## 2020-07-29 NOTE — PROGRESS NOTES
ONCOLOGY HEMATOLOGY CARE PROGRESS NOTE      SUBJECTIVE:     Tolerated transfusion well. He is hesitant to leave saying, \" Can I stay until Aug 1st?\"         ROS:   The remaining 10 point review of symptoms is unremarkable. OBJECTIVE        Physical    VITALS:  /64   Pulse 74   Temp 97.4 °F (36.3 °C) (Axillary)   Resp 18   Ht 5' 8\" (1.727 m)   Wt 182 lb 9.6 oz (82.8 kg)   SpO2 93%   BMI 27.76 kg/m²   TEMPERATURE:  Current - Temp: 97.4 °F (36.3 °C); Max - Temp  Av.4 °F (36.9 °C)  Min: 97.4 °F (36.3 °C)  Max: 98.8 °F (37.1 °C)  PULSE OXIMETRY RANGE: SpO2  Av.1 %  Min: 91 %  Max: 93 %  24HR INTAKE/OUTPUT:      Intake/Output Summary (Last 24 hours) at 2020 0950  Last data filed at 2020 0854  Gross per 24 hour   Intake 7246.75 ml   Output 3275 ml   Net 3971.75 ml       CONSTITUTIONAL:  awake, alert, cooperative, no apparent distress, HEENT oral pharynx , no scleral icterus  HEMATOLOGIC/LYMPHATICS:  no cervical lymphadenopathy, no supraclavicular lymphadenopathy, no axillary lymphadenopathy and no inguinal lymphadenopathy  LUNGS:  No increased work of breathing, good air exchange, clear to auscultation bilaterally, no crackles or wheezing  CARDIOVASCULAR:  , regular rate and rhythm, normal S1 and S2, no S3 or S4, and no murmur noted  ABDOMEN:  No scars, normal bowel sounds, soft, non-distended, non-tender, no masses palpated, no hepatosplenomegally  MUSCULOSKELETAL:  There is no redness, warmth, or swelling of the joints. EXTREMETIES: No clubbing cynosis or edema  NEUROLOGIC:  Awake, alert, oriented to name, place and time. Cranial nerves II-XII are grossly intact. Motor is 5 out of 5 bilaterally.    SKIN:  no bruising or bleeding      Data      Recent Labs     20  0555 20  0730   WBC 11.2* 11.7*   HGB 7.5* 7.5*   HCT 21.4* 20.7*    412   MCV 99.7 98.1        Recent Labs     20  0554 20  0730 20  0551    139 141   K 4.1 4.2 4.7    101 104   CO2 26 29 25   BUN 10 10 10   CREATININE 0.6* 0.6* 0.7*     Recent Labs     07/27/20  0554 07/28/20  0730 07/29/20  0551   AST 25 29 40*   ALT 9* 12 18   BILITOT 5.4* 5.6* 6.1*   ALKPHOS 54 54 49       Magnesium:    Lab Results   Component Value Date    MG 2.00 07/29/2020    MG 1.90 07/28/2020    MG 2.00 07/27/2020         Problem List  Patient Active Problem List   Diagnosis    Sickle cell pain crisis ([facility] [address]    Asthma    Sickle cell crisis ([facility] [address]       ASSESSMENT AND PLAN:    Sickle cell crisis:  -No evidence of acute chest pain syndrome  -No mental status changes  -Cont Toradol X 3 days - completed   -Cont Methadone   -Continue PRN Dilaudid  -I did explain to the patient that upon discharge we could not increase his methadone and that he would need to see his pain specialist.  He has a pain contract with his pain specialist.     Sickle cell anemia:  -He is not on a transfusion protocol and gets admitted very infrequently  -He will continue his Hydrea  -Continue folic acid  - Transfuse 1 unit PRBC 7-28   - Ambulate in halls- PT/OT eval ongoing    He was told he would need to be mentally prepared to dc tomorrow. VSS and s/p PRBC. I will start weaning his Dilaudid today as discussed with the patient. I decreased his Dilaudid from 2 mg to 1 mg. He was told I would NOT be able to renew any of his pain meds since he already has a pain specialist. HE was instructed to call for refills in prep for dc tomorrow. He was told he certainly could not have a Aug 1st dc date.  I educated him on the risk for PNA, CDIFF, and other hospital associated infections and risk for further debility the more he lays in bed.            ONCOLOGIC DISPOSITION: tomorrow     Dev Dupont CNP  [facility]   Please contact through [facility]

## 2020-07-30 VITALS
SYSTOLIC BLOOD PRESSURE: 119 MMHG | DIASTOLIC BLOOD PRESSURE: 66 MMHG | RESPIRATION RATE: 18 BRPM | BODY MASS INDEX: 28.08 KG/M2 | WEIGHT: 185.3 LBS | OXYGEN SATURATION: 93 % | HEIGHT: 68 IN | HEART RATE: 91 BPM | TEMPERATURE: 98.4 F

## 2020-07-30 LAB
A/G RATIO: 1.6 (ref 1.1–2.2)
ALBUMIN SERPL-MCNC: 4.4 G/DL (ref 3.4–5)
ALP BLD-CCNC: 62 U/L (ref 40–129)
ALT SERPL-CCNC: 23 U/L (ref 10–40)
ANION GAP SERPL CALCULATED.3IONS-SCNC: 12 MMOL/L (ref 3–16)
ANISOCYTOSIS: ABNORMAL
AST SERPL-CCNC: 45 U/L (ref 15–37)
ATYPICAL LYMPHOCYTE RELATIVE PERCENT: 4 % (ref 0–6)
BANDED NEUTROPHILS RELATIVE PERCENT: 1 % (ref 0–7)
BASOPHILS ABSOLUTE: 0.1 K/UL (ref 0–0.2)
BASOPHILS RELATIVE PERCENT: 1 %
BILIRUB SERPL-MCNC: 8.6 MG/DL (ref 0–1)
BUN BLDV-MCNC: 12 MG/DL (ref 7–20)
CALCIUM SERPL-MCNC: 9.9 MG/DL (ref 8.3–10.6)
CHLORIDE BLD-SCNC: 101 MMOL/L (ref 99–110)
CO2: 26 MMOL/L (ref 21–32)
CREAT SERPL-MCNC: 0.7 MG/DL (ref 0.9–1.3)
EOSINOPHILS ABSOLUTE: 0.4 K/UL (ref 0–0.6)
EOSINOPHILS RELATIVE PERCENT: 3 %
GFR AFRICAN AMERICAN: >60
GFR NON-AFRICAN AMERICAN: >60
GLOBULIN: 2.7 G/DL
GLUCOSE BLD-MCNC: 107 MG/DL (ref 70–99)
HCT VFR BLD CALC: 25 % (ref 40.5–52.5)
HEMATOLOGY PATH CONSULT: NO
HEMOGLOBIN: 8.9 G/DL (ref 13.5–17.5)
LYMPHOCYTES ABSOLUTE: 2.8 K/UL (ref 1–5.1)
LYMPHOCYTES RELATIVE PERCENT: 15 %
MACROCYTES: ABNORMAL
MAGNESIUM: 2.1 MG/DL (ref 1.8–2.4)
MCH RBC QN AUTO: 36.2 PG (ref 26–34)
MCHC RBC AUTO-ENTMCNC: 35.5 G/DL (ref 31–36)
MCV RBC AUTO: 101.9 FL (ref 80–100)
MICROCYTES: ABNORMAL
MONOCYTES ABSOLUTE: 0.1 K/UL (ref 0–1.3)
MONOCYTES RELATIVE PERCENT: 1 %
NEUTROPHILS ABSOLUTE: 11 K/UL (ref 1.7–7.7)
NEUTROPHILS RELATIVE PERCENT: 75 %
OVALOCYTES: ABNORMAL
PDW BLD-RTO: 24.6 % (ref 12.4–15.4)
PLATELET # BLD: 409 K/UL (ref 135–450)
PMV BLD AUTO: 8.8 FL (ref 5–10.5)
POIKILOCYTES: ABNORMAL
POLYCHROMASIA: ABNORMAL
POTASSIUM SERPL-SCNC: 4.5 MMOL/L (ref 3.5–5.1)
RBC # BLD: 2.46 M/UL (ref 4.2–5.9)
SCHISTOCYTES: ABNORMAL
SICKLE CELLS: ABNORMAL
SODIUM BLD-SCNC: 139 MMOL/L (ref 136–145)
TARGET CELLS: ABNORMAL
TEAR DROP CELLS: ABNORMAL
TOTAL PROTEIN: 7.1 G/DL (ref 6.4–8.2)
WBC # BLD: 14.5 K/UL (ref 4–11)

## 2020-07-30 PROCEDURE — 6370000000 HC RX 637 (ALT 250 FOR IP): Performed by: INTERNAL MEDICINE

## 2020-07-30 PROCEDURE — 80053 COMPREHEN METABOLIC PANEL: CPT

## 2020-07-30 PROCEDURE — 36415 COLL VENOUS BLD VENIPUNCTURE: CPT

## 2020-07-30 PROCEDURE — 6360000002 HC RX W HCPCS: Performed by: NURSE PRACTITIONER

## 2020-07-30 PROCEDURE — 83735 ASSAY OF MAGNESIUM: CPT

## 2020-07-30 PROCEDURE — 2580000003 HC RX 258: Performed by: INTERNAL MEDICINE

## 2020-07-30 PROCEDURE — 85025 COMPLETE CBC W/AUTO DIFF WBC: CPT

## 2020-07-30 RX ADMIN — FOLIC ACID 1 MG: 1 TABLET ORAL at 08:13

## 2020-07-30 RX ADMIN — OXYCODONE 10 MG: 5 TABLET ORAL at 09:14

## 2020-07-30 RX ADMIN — OXYCODONE 10 MG: 5 TABLET ORAL at 13:30

## 2020-07-30 RX ADMIN — HYDROXYUREA 2000 MG: 500 CAPSULE ORAL at 08:17

## 2020-07-30 RX ADMIN — OXYCODONE 10 MG: 5 TABLET ORAL at 05:13

## 2020-07-30 RX ADMIN — HYDROMORPHONE HYDROCHLORIDE 1 MG: 1 INJECTION, SOLUTION INTRAMUSCULAR; INTRAVENOUS; SUBCUTANEOUS at 01:12

## 2020-07-30 RX ADMIN — Medication 10 ML: at 01:12

## 2020-07-30 RX ADMIN — METHADONE HYDROCHLORIDE 10 MG: 10 TABLET ORAL at 06:24

## 2020-07-30 RX ADMIN — ACETAMINOPHEN 1000 MG: 500 TABLET ORAL at 08:13

## 2020-07-30 ASSESSMENT — PAIN SCALES - GENERAL
PAINLEVEL_OUTOF10: 9
PAINLEVEL_OUTOF10: 8
PAINLEVEL_OUTOF10: 9
PAINLEVEL_OUTOF10: 8
PAINLEVEL_OUTOF10: 7
PAINLEVEL_OUTOF10: 8

## 2020-07-30 NOTE — PROGRESS NOTES
ONCOLOGY HEMATOLOGY CARE PROGRESS NOTE      SUBJECTIVE:     He is agreeable to leaving today. No chest pain. Hgb 8.9         ROS:   The remaining 10 point review of symptoms is unremarkable. OBJECTIVE        Physical    VITALS:  /66   Pulse 91   Temp 98.4 °F (36.9 °C) (Oral)   Resp 18   Ht 5' 8\" (1.727 m)   Wt 185 lb 4.8 oz (84.1 kg)   SpO2 93%   BMI 28.17 kg/m²   TEMPERATURE:  Current - Temp: 98.4 °F (36.9 °C); Max - Temp  Av.2 °F (36.8 °C)  Min: 97.8 °F (36.6 °C)  Max: 98.5 °F (36.9 °C)  PULSE OXIMETRY RANGE: SpO2  Av %  Min: 91 %  Max: 93 %  24HR INTAKE/OUTPUT:      Intake/Output Summary (Last 24 hours) at 2020 1057  Last data filed at 2020 0932  Gross per 24 hour   Intake 2144 ml   Output 1875 ml   Net 269 ml       CONSTITUTIONAL:  awake, alert, cooperative, no apparent distress, HEENT oral pharynx , no scleral icterus  HEMATOLOGIC/LYMPHATICS:  no cervical lymphadenopathy, no supraclavicular lymphadenopathy, no axillary lymphadenopathy and no inguinal lymphadenopathy  LUNGS:  No increased work of breathing, good air exchange, clear to auscultation bilaterally, no crackles or wheezing  CARDIOVASCULAR:  , regular rate and rhythm, normal S1 and S2, no S3 or S4, and no murmur noted  ABDOMEN:  No scars, normal bowel sounds, soft, non-distended, non-tender, no masses palpated, no hepatosplenomegally  MUSCULOSKELETAL:  There is no redness, warmth, or swelling of the joints. EXTREMETIES: No clubbing cynosis or edema  NEUROLOGIC:  Awake, alert, oriented to name, place and time. Cranial nerves II-XII are grossly intact. Motor is 5 out of 5 bilaterally.    SKIN:  no bruising or bleeding      Data      Recent Labs     20  0730 20  0541   WBC 11.7* 14.5*   HGB 7.5* 8.9*   HCT 20.7* 25.0*    409   MCV 98.1 101.9*        Recent Labs     20  0730 20  0551 20  0541    141 139   K 4.2 4.7 4.5    104 101   CO2 29 25 26   BUN 10 10 12   CREATININE 0.6* 0.7* 0.7*     Recent Labs     07/28/20  0730 07/29/20  0551 07/30/20  0541   AST 29 40* 45*   ALT 12 18 23   BILITOT 5.6* 6.1* 8.6*   ALKPHOS 54 49 62       Magnesium:    Lab Results   Component Value Date    MG 2.10 07/30/2020    MG 2.00 07/29/2020    MG 1.90 07/28/2020         Problem List  Patient Active Problem List   Diagnosis    Sickle cell pain crisis ([facility] [address]    Asthma    Sickle cell crisis ([facility] [address]       ASSESSMENT AND PLAN:    Sickle cell crisis:  -No evidence of acute chest pain syndrome  -No mental status changes  -Cont Toradol X 3 days - completed   -I did explain to the patient that upon discharge we could not increase his methadone and that he would need to see his pain specialist.  He has a pain contract with his pain specialist. He says he cannot have his meds filled until 8-3- I told him to discuss with pain mgmt doctor and request a visit if he feels he cannot wait   - Push PO fluids at home  - Stop IV dilaudid this AM- oral PRN Oxy only prior to dc.  Cont home Methadone dose      Sickle cell anemia:  -He is not on a transfusion protocol and gets admitted very infrequently  -He will continue his Hydrea  -Continue folic acid  - Transfuse 1 unit PRBC 7-28 - Hgb 8.9 now   - Ambulate in halls    ONCOLOGIC DISPOSITION: Today, follow up with primary hematologist in 1-2 weeks and follow up with pain mgmt     Sabrina Guallpa, LYNNETTE  [facility]   Please contact through [facility]

## 2020-07-30 NOTE — DISCHARGE SUMMARY
Hospital Medicine Discharge Summary    Patient ID: Critical access hospital      Patient's PCP: Neftaly Desai MD    Admit Date: 7/25/2020     Discharge Date: 7/30/2020      Admitting Physician: Sean Angelucci, MD     Discharge Physician: CARMELITA Boucher - CNP     Discharge Diagnoses: Active Hospital Problems    Diagnosis    Sickle cell crisis ([facility] [address] [D57.00]    Sickle cell pain crisis ([facility] [address] [D57.00]    Asthma [J45.909]       The patient was seen and examined on day of discharge and this discharge summary is in conjunction with any daily progress note from day of discharge. Hospital Course:   Javier Javed is a 23 y. o. male. He has a h/o sickle cell anemia. He has chronic lower extremity pain which waxes and wanes. Usually he says in mild flares pain stays at or below his knees. Lately pain has been creeping up into his thighs. He has oral hydromorphone available for use at home, but has been unable to keep up with the pain this way so comes in tonight for further care. This flare has been building for roughly a week. He visited the ER for the same complaint on 7/22. He otherwise feels ok. He denies rigors, sweats, anorexia, nausea, diarrhea. He states that sometimes when he takes a deep breath it feels as if he isn't really getting a deep breath. He denies outright dyspnea, wheezing, cough, pleuritic pain, and hemoptysis. Sickle cell pain crisis:  - No evidence of acute chest syndrome. No mental status changes. - Hematology consulted while inpatient.   - Received IV fluids.    - Continue scheduled methadone.   - Continue hydrea and folic acid. - S/p 1 unit PRBC on 7/28.  - Continue outpt follow-up with Hematologist in 1-2 weeks and his pain specialist.      Asthma:  - Asymptomatic at this time. - Portable CXR suggested a subtle abnormality. No clinical evidence of a respiratory or other infectious process. - Home albuterol available prn.       Physical Exam Performed:     BP 119/66   Pulse 91   Temp 98.4 °F (36.9 °C) (Oral)   Resp 18   Ht 5' 8\" (1.727 m)   Wt 185 lb 4.8 oz (84.1 kg)   SpO2 93%   BMI 28.17 kg/m²       General appearance:  AA male in no apparent distress, appears stated age and cooperative. HEENT:  Normal cephalic, atraumatic without obvious deformity. Pupils equal, round, and reactive to light. Extra ocular muscles intact. Conjunctivae/corneas clear. Neck: Supple, with full range of motion. No jugular venous distention. Trachea midline. Respiratory:  Normal respiratory effort. Clear to auscultation, bilaterally without Rales/Wheezes/Rhonchi. Cardiovascular:  Regular rate and rhythm with normal S1/S2 without murmurs, rubs or gallops. Abdomen: Soft, non-tender, non-distended with normal bowel sounds. Musculoskeletal:  No clubbing, cyanosis or edema bilaterally. Full range of motion without deformity. Skin: Skin color, texture, turgor normal.  No rashes or lesions. Neurologic:  Neurovascularly intact without any focal sensory/motor deficits. Cranial nerves: II-XII intact, grossly non-focal.  Psychiatric:  Alert and oriented, thought content appropriate, normal insight  Capillary Refill: Brisk,< 3 seconds   Peripheral Pulses: +2 palpable, equal bilaterally       Labs: For convenience and continuity at follow-up the following most recent labs are provided:      CBC:    Lab Results   Component Value Date    WBC 14.5 07/30/2020    HGB 8.9 07/30/2020    HCT 25.0 07/30/2020     07/30/2020       Renal:    Lab Results   Component Value Date     07/30/2020    K 4.5 07/30/2020    K 4.3 07/25/2020     07/30/2020    CO2 26 07/30/2020    BUN 12 07/30/2020    CREATININE 0.7 07/30/2020    CALCIUM 9.9 07/30/2020         Significant Diagnostic Studies    Radiology:   XR CHEST PORTABLE   Final Result   Subtle right infrahilar airspace opacity within the medial right lung base.                 Consults:     IP CONSULT TO HOSPITALIST  IP CONSULT TO ONCOLOGY    Disposition:  Home     Condition at Discharge: Stable    Discharge Instructions/Follow-up:  Follow-up with Hematologist and Pain management     Code Status:  Full Code     Activity: activity as tolerated    Diet: regular diet      Discharge Medications:     Discharge Medication List as of 7/30/2020  1:34 PM           Details   folic acid (FOLVITE) 1 MG tablet Take 1 mg by mouth dailyHistorical Med      hydroxyurea (HYDREA) 500 MG chemo capsule Take by mouth daily Unsure of doseHistorical Med      methadone (DOLOPHINE) 5 MG tablet Take 5 mg by mouth every 4 hours as needed for Pain. Historical Med             Time Spent on discharge is more than 45 minutes in the examination, evaluation, counseling and review of medications and discharge plan. Signed:    CARMELITA Brooks - LYNNETTE   7/30/2020      Thank you Christo Colón MD for the opportunity to be involved in this patient's care. If you have any questions or concerns please feel free to contact me at [phone].

## 2020-07-30 NOTE — PLAN OF CARE
Problem: Pain:  Goal: Pain level will decrease  Outcome: Ongoing     Problem: Falls - Risk of:  Goal: Will remain free from falls  Outcome: Ongoing

## 2020-07-30 NOTE — PROGRESS NOTES
Rn stood outside pt's room and watched pt lower himself to the floor onto his hands and knees. Pt then began to yell out, when asking the pt what is wrong pt stated he fell and is in so much pain. RN told pt that she witnessed pt ease himself down and then got up and sat on the couch and stated well then can you take my iv out. RN let charge and manager know of pt's behavior.

## 2020-08-01 LAB
BLOOD BANK DISPENSE STATUS: NORMAL
BLOOD BANK DISPENSE STATUS: NORMAL
BLOOD BANK PRODUCT CODE: NORMAL
BLOOD BANK PRODUCT CODE: NORMAL
BPU ID: NORMAL
BPU ID: NORMAL
DESCRIPTION BLOOD BANK: NORMAL
DESCRIPTION BLOOD BANK: NORMAL

## 2020-08-06 ENCOUNTER — APPOINTMENT (OUTPATIENT)
Dept: CT IMAGING | Age: 20
DRG: 812 | End: 2020-08-06
Payer: COMMERCIAL

## 2020-08-06 ENCOUNTER — APPOINTMENT (OUTPATIENT)
Dept: GENERAL RADIOLOGY | Age: 20
DRG: 812 | End: 2020-08-06
Payer: COMMERCIAL

## 2020-08-06 ENCOUNTER — HOSPITAL ENCOUNTER (INPATIENT)
Age: 20
LOS: 20 days | Discharge: HOME | DRG: 812 | End: 2020-08-26
Attending: EMERGENCY MEDICINE | Admitting: INTERNAL MEDICINE
Payer: COMMERCIAL

## 2020-08-06 PROBLEM — A41.9 SEPSIS (HCC): Status: ACTIVE | Noted: 2020-08-06

## 2020-08-06 LAB
A/G RATIO: 1.7 (ref 1.1–2.2)
ALBUMIN SERPL-MCNC: 4.5 G/DL (ref 3.4–5)
ALP BLD-CCNC: 51 U/L (ref 40–129)
ALT SERPL-CCNC: 14 U/L (ref 10–40)
ANION GAP SERPL CALCULATED.3IONS-SCNC: 11 MMOL/L (ref 3–16)
ANISOCYTOSIS: ABNORMAL
AST SERPL-CCNC: 35 U/L (ref 15–37)
ATYPICAL LYMPHOCYTE RELATIVE PERCENT: 1 % (ref 0–6)
BASOPHILS ABSOLUTE: 0.2 K/UL (ref 0–0.2)
BASOPHILS RELATIVE PERCENT: 1 %
BILIRUB SERPL-MCNC: 7.4 MG/DL (ref 0–1)
BILIRUBIN URINE: NEGATIVE
BLOOD, URINE: ABNORMAL
BUN BLDV-MCNC: 15 MG/DL (ref 7–20)
CALCIUM SERPL-MCNC: 9.5 MG/DL (ref 8.3–10.6)
CHLORIDE BLD-SCNC: 97 MMOL/L (ref 99–110)
CLARITY: CLEAR
CO2: 22 MMOL/L (ref 21–32)
COLOR: YELLOW
CREAT SERPL-MCNC: 0.8 MG/DL (ref 0.9–1.3)
EOSINOPHILS ABSOLUTE: 0 K/UL (ref 0–0.6)
EOSINOPHILS RELATIVE PERCENT: 0 %
GFR AFRICAN AMERICAN: >60
GFR NON-AFRICAN AMERICAN: >60
GLOBULIN: 2.7 G/DL
GLUCOSE BLD-MCNC: 89 MG/DL (ref 70–99)
GLUCOSE URINE: NEGATIVE MG/DL
HCT VFR BLD CALC: 22.2 % (ref 40.5–52.5)
HEMATOLOGY PATH CONSULT: NO
HEMOGLOBIN: 7.8 G/DL (ref 13.5–17.5)
IMMATURE RETIC FRACT: 0.73 (ref 0.21–0.37)
INR BLD: 1.44 (ref 0.86–1.14)
KETONES, URINE: NEGATIVE MG/DL
LACTIC ACID: 0.6 MMOL/L (ref 0.4–2)
LEUKOCYTE ESTERASE, URINE: NEGATIVE
LYMPHOCYTES ABSOLUTE: 2.1 K/UL (ref 1–5.1)
LYMPHOCYTES RELATIVE PERCENT: 9 %
MACROCYTES: ABNORMAL
MCH RBC QN AUTO: 38.7 PG (ref 26–34)
MCHC RBC AUTO-ENTMCNC: 35 G/DL (ref 31–36)
MCV RBC AUTO: 110.5 FL (ref 80–100)
MICROCYTES: ABNORMAL
MICROSCOPIC EXAMINATION: YES
MONOCYTES ABSOLUTE: 2.7 K/UL (ref 0–1.3)
MONOCYTES RELATIVE PERCENT: 13 %
NEUTROPHILS ABSOLUTE: 15.7 K/UL (ref 1.7–7.7)
NEUTROPHILS RELATIVE PERCENT: 76 %
NITRITE, URINE: NEGATIVE
NUCLEATED RED BLOOD CELLS: 2 /100 WBC
NUCLEATED RED BLOOD CELLS: 2 /100 WBC
OVALOCYTES: ABNORMAL
PDW BLD-RTO: 23.2 % (ref 12.4–15.4)
PH UA: 6 (ref 5–8)
PLATELET # BLD: 291 K/UL (ref 135–450)
PLATELET SLIDE REVIEW: ADEQUATE
PMV BLD AUTO: 8.8 FL (ref 5–10.5)
POIKILOCYTES: ABNORMAL
POLYCHROMASIA: ABNORMAL
POTASSIUM REFLEX MAGNESIUM: 4.1 MMOL/L (ref 3.5–5.1)
PROCALCITONIN: 0.77 NG/ML (ref 0–0.15)
PROTEIN UA: NEGATIVE MG/DL
PROTHROMBIN TIME: 16.8 SEC (ref 10–13.2)
RBC # BLD: 2.01 M/UL (ref 4.2–5.9)
RBC UA: NORMAL /HPF (ref 0–4)
RETICULOCYTE ABSOLUTE COUNT: 0.19 M/UL
RETICULOCYTE COUNT PCT: 9.58 % (ref 0.5–2.18)
SCHISTOCYTES: ABNORMAL
SICKLE CELLS: ABNORMAL
SLIDE REVIEW: ABNORMAL
SODIUM BLD-SCNC: 130 MMOL/L (ref 136–145)
SPECIFIC GRAVITY UA: <=1.005 (ref 1–1.03)
TARGET CELLS: ABNORMAL
TEAR DROP CELLS: ABNORMAL
TOTAL PROTEIN: 7.2 G/DL (ref 6.4–8.2)
URINE REFLEX TO CULTURE: ABNORMAL
URINE TYPE: ABNORMAL
UROBILINOGEN, URINE: 0.2 E.U./DL
WBC # BLD: 20.7 K/UL (ref 4–11)
WBC UA: NORMAL /HPF (ref 0–5)

## 2020-08-06 PROCEDURE — 96367 TX/PROPH/DG ADDL SEQ IV INF: CPT

## 2020-08-06 PROCEDURE — 1200000000 HC SEMI PRIVATE

## 2020-08-06 PROCEDURE — 71045 X-RAY EXAM CHEST 1 VIEW: CPT

## 2020-08-06 PROCEDURE — 6370000000 HC RX 637 (ALT 250 FOR IP): Performed by: EMERGENCY MEDICINE

## 2020-08-06 PROCEDURE — 85025 COMPLETE CBC W/AUTO DIFF WBC: CPT

## 2020-08-06 PROCEDURE — 84145 PROCALCITONIN (PCT): CPT

## 2020-08-06 PROCEDURE — 2700000000 HC OXYGEN THERAPY PER DAY

## 2020-08-06 PROCEDURE — 99285 EMERGENCY DEPT VISIT HI MDM: CPT

## 2020-08-06 PROCEDURE — 6360000002 HC RX W HCPCS: Performed by: PHYSICIAN ASSISTANT

## 2020-08-06 PROCEDURE — 83605 ASSAY OF LACTIC ACID: CPT

## 2020-08-06 PROCEDURE — 96375 TX/PRO/DX INJ NEW DRUG ADDON: CPT

## 2020-08-06 PROCEDURE — 94761 N-INVAS EAR/PLS OXIMETRY MLT: CPT

## 2020-08-06 PROCEDURE — 2580000003 HC RX 258: Performed by: PHYSICIAN ASSISTANT

## 2020-08-06 PROCEDURE — 81001 URINALYSIS AUTO W/SCOPE: CPT

## 2020-08-06 PROCEDURE — 6360000004 HC RX CONTRAST MEDICATION: Performed by: PHYSICIAN ASSISTANT

## 2020-08-06 PROCEDURE — 80053 COMPREHEN METABOLIC PANEL: CPT

## 2020-08-06 PROCEDURE — 87040 BLOOD CULTURE FOR BACTERIA: CPT

## 2020-08-06 PROCEDURE — 71260 CT THORAX DX C+: CPT

## 2020-08-06 PROCEDURE — 85045 AUTOMATED RETICULOCYTE COUNT: CPT

## 2020-08-06 PROCEDURE — 36415 COLL VENOUS BLD VENIPUNCTURE: CPT

## 2020-08-06 PROCEDURE — 85610 PROTHROMBIN TIME: CPT

## 2020-08-06 PROCEDURE — 96376 TX/PRO/DX INJ SAME DRUG ADON: CPT

## 2020-08-06 PROCEDURE — 96365 THER/PROPH/DIAG IV INF INIT: CPT

## 2020-08-06 RX ORDER — METHADONE HYDROCHLORIDE 5 MG/1
5 TABLET ORAL EVERY 4 HOURS PRN
Status: DISCONTINUED | OUTPATIENT
Start: 2020-08-06 | End: 2020-08-07

## 2020-08-06 RX ORDER — ACETAMINOPHEN 500 MG
1000 TABLET ORAL ONCE
Status: COMPLETED | OUTPATIENT
Start: 2020-08-06 | End: 2020-08-06

## 2020-08-06 RX ORDER — HYDROMORPHONE HCL 110MG/55ML
0.25 PATIENT CONTROLLED ANALGESIA SYRINGE INTRAVENOUS
Status: DISCONTINUED | OUTPATIENT
Start: 2020-08-06 | End: 2020-08-07

## 2020-08-06 RX ORDER — SODIUM CHLORIDE 0.9 % (FLUSH) 0.9 %
10 SYRINGE (ML) INJECTION EVERY 12 HOURS SCHEDULED
Status: DISCONTINUED | OUTPATIENT
Start: 2020-08-06 | End: 2020-08-26 | Stop reason: HOSPADM

## 2020-08-06 RX ORDER — HYDROMORPHONE HCL 110MG/55ML
0.5 PATIENT CONTROLLED ANALGESIA SYRINGE INTRAVENOUS
Status: DISCONTINUED | OUTPATIENT
Start: 2020-08-06 | End: 2020-08-07

## 2020-08-06 RX ORDER — 0.9 % SODIUM CHLORIDE 0.9 %
30 INTRAVENOUS SOLUTION INTRAVENOUS ONCE
Status: COMPLETED | OUTPATIENT
Start: 2020-08-06 | End: 2020-08-06

## 2020-08-06 RX ORDER — ACETAMINOPHEN 325 MG/1
650 TABLET ORAL EVERY 6 HOURS PRN
Status: DISCONTINUED | OUTPATIENT
Start: 2020-08-06 | End: 2020-08-26 | Stop reason: HOSPADM

## 2020-08-06 RX ORDER — ACETAMINOPHEN 650 MG/1
650 SUPPOSITORY RECTAL EVERY 6 HOURS PRN
Status: DISCONTINUED | OUTPATIENT
Start: 2020-08-06 | End: 2020-08-26 | Stop reason: HOSPADM

## 2020-08-06 RX ORDER — FOLIC ACID 1 MG/1
1 TABLET ORAL DAILY
Status: DISCONTINUED | OUTPATIENT
Start: 2020-08-07 | End: 2020-08-17

## 2020-08-06 RX ORDER — SODIUM CHLORIDE 0.9 % (FLUSH) 0.9 %
10 SYRINGE (ML) INJECTION PRN
Status: DISCONTINUED | OUTPATIENT
Start: 2020-08-06 | End: 2020-08-26 | Stop reason: HOSPADM

## 2020-08-06 RX ORDER — ONDANSETRON 2 MG/ML
4 INJECTION INTRAMUSCULAR; INTRAVENOUS ONCE
Status: COMPLETED | OUTPATIENT
Start: 2020-08-06 | End: 2020-08-06

## 2020-08-06 RX ORDER — SODIUM CHLORIDE 9 MG/ML
INJECTION, SOLUTION INTRAVENOUS CONTINUOUS
Status: DISCONTINUED | OUTPATIENT
Start: 2020-08-06 | End: 2020-08-26 | Stop reason: HOSPADM

## 2020-08-06 RX ADMIN — SODIUM CHLORIDE 2313 ML: 9 INJECTION, SOLUTION INTRAVENOUS at 19:39

## 2020-08-06 RX ADMIN — VANCOMYCIN HYDROCHLORIDE 1250 MG: 10 INJECTION, POWDER, LYOPHILIZED, FOR SOLUTION INTRAVENOUS at 21:10

## 2020-08-06 RX ADMIN — HYDROMORPHONE HYDROCHLORIDE 1 MG: 1 INJECTION, SOLUTION INTRAMUSCULAR; INTRAVENOUS; SUBCUTANEOUS at 21:49

## 2020-08-06 RX ADMIN — CEFEPIME HYDROCHLORIDE 2 G: 2 INJECTION, POWDER, FOR SOLUTION INTRAVENOUS at 20:19

## 2020-08-06 RX ADMIN — IOPAMIDOL 75 ML: 755 INJECTION, SOLUTION INTRAVENOUS at 20:58

## 2020-08-06 RX ADMIN — ONDANSETRON 4 MG: 2 INJECTION INTRAMUSCULAR; INTRAVENOUS at 19:39

## 2020-08-06 RX ADMIN — ACETAMINOPHEN 1000 MG: 500 TABLET ORAL at 19:53

## 2020-08-06 RX ADMIN — HYDROMORPHONE HYDROCHLORIDE 1 MG: 1 INJECTION, SOLUTION INTRAMUSCULAR; INTRAVENOUS; SUBCUTANEOUS at 19:40

## 2020-08-06 ASSESSMENT — ENCOUNTER SYMPTOMS
EYES NEGATIVE: 1
ABDOMINAL PAIN: 0
VOMITING: 0
COUGH: 0
SHORTNESS OF BREATH: 0
NAUSEA: 0
COLOR CHANGE: 0
BACK PAIN: 0

## 2020-08-06 ASSESSMENT — PAIN DESCRIPTION - PAIN TYPE: TYPE: ACUTE PAIN

## 2020-08-06 ASSESSMENT — PAIN SCALES - GENERAL
PAINLEVEL_OUTOF10: 7
PAINLEVEL_OUTOF10: 8
PAINLEVEL_OUTOF10: 8
PAINLEVEL_OUTOF10: 10

## 2020-08-06 NOTE — LETTER
[facility] TELE/MED SURG/ONC  [address] [facility] [address]  Phone: [phone]          August 15, 2020     Patient: [facility]   YOB: 2000   Date of Visit: 8/6/2020       To Whom It May Concern:    [facility] was admitted on 8/6/2020 and is currently still inpatient. If you have any questions or concerns, please don't hesitate to call.     Sincerely,  GIOVANNY ALEJANDRO

## 2020-08-06 NOTE — ED NOTES
Pt O2 dropped to 84% on room air. Placed on 4L NC. O2 at 96% now.       Charan Stover RN  08/06/20 1038

## 2020-08-07 LAB
ABO/RH: NORMAL
ANION GAP SERPL CALCULATED.3IONS-SCNC: 12 MMOL/L (ref 3–16)
ANISOCYTOSIS: ABNORMAL
ANTIBODY SCREEN: NORMAL
BANDED NEUTROPHILS RELATIVE PERCENT: 2 % (ref 0–7)
BASOPHILS ABSOLUTE: 0 K/UL (ref 0–0.2)
BASOPHILS RELATIVE PERCENT: 0 %
BUN BLDV-MCNC: 11 MG/DL (ref 7–20)
CALCIUM SERPL-MCNC: 9.1 MG/DL (ref 8.3–10.6)
CHLORIDE BLD-SCNC: 103 MMOL/L (ref 99–110)
CO2: 18 MMOL/L (ref 21–32)
CREAT SERPL-MCNC: 0.6 MG/DL (ref 0.9–1.3)
EOSINOPHILS ABSOLUTE: 0 K/UL (ref 0–0.6)
EOSINOPHILS RELATIVE PERCENT: 0 %
GFR AFRICAN AMERICAN: >60
GFR NON-AFRICAN AMERICAN: >60
GLUCOSE BLD-MCNC: 97 MG/DL (ref 70–99)
HCT VFR BLD CALC: 19.3 % (ref 40.5–52.5)
HCT VFR BLD CALC: 21.4 % (ref 40.5–52.5)
HEMATOLOGY PATH CONSULT: NO
HEMOGLOBIN: 6.8 G/DL (ref 13.5–17.5)
HEMOGLOBIN: 7.5 G/DL (ref 13.5–17.5)
LYMPHOCYTES ABSOLUTE: 2.4 K/UL (ref 1–5.1)
LYMPHOCYTES RELATIVE PERCENT: 14 %
MCH RBC QN AUTO: 39 PG (ref 26–34)
MCHC RBC AUTO-ENTMCNC: 35.4 G/DL (ref 31–36)
MCV RBC AUTO: 110.4 FL (ref 80–100)
MONOCYTES ABSOLUTE: 1.7 K/UL (ref 0–1.3)
MONOCYTES RELATIVE PERCENT: 10 %
NEUTROPHILS ABSOLUTE: 13.1 K/UL (ref 1.7–7.7)
NEUTROPHILS RELATIVE PERCENT: 74 %
OVALOCYTES: ABNORMAL
PDW BLD-RTO: 24.7 % (ref 12.4–15.4)
PLATELET # BLD: 240 K/UL (ref 135–450)
PLATELET SLIDE REVIEW: ADEQUATE
PMV BLD AUTO: 8.6 FL (ref 5–10.5)
POIKILOCYTES: ABNORMAL
POLYCHROMASIA: ABNORMAL
POTASSIUM REFLEX MAGNESIUM: 5 MMOL/L (ref 3.5–5.1)
RBC # BLD: 1.75 M/UL (ref 4.2–5.9)
REASON FOR REJECTION: NORMAL
REJECTED TEST: NORMAL
SCHISTOCYTES: ABNORMAL
SICKLE CELLS: ABNORMAL
SLIDE REVIEW: ABNORMAL
SODIUM BLD-SCNC: 133 MMOL/L (ref 136–145)
TEAR DROP CELLS: ABNORMAL
WBC # BLD: 17.3 K/UL (ref 4–11)

## 2020-08-07 PROCEDURE — 1200000000 HC SEMI PRIVATE

## 2020-08-07 PROCEDURE — 6360000002 HC RX W HCPCS: Performed by: INTERNAL MEDICINE

## 2020-08-07 PROCEDURE — 6360000002 HC RX W HCPCS: Performed by: NURSE PRACTITIONER

## 2020-08-07 PROCEDURE — 85014 HEMATOCRIT: CPT

## 2020-08-07 PROCEDURE — 80202 ASSAY OF VANCOMYCIN: CPT

## 2020-08-07 PROCEDURE — 85025 COMPLETE CBC W/AUTO DIFF WBC: CPT

## 2020-08-07 PROCEDURE — 86923 COMPATIBILITY TEST ELECTRIC: CPT

## 2020-08-07 PROCEDURE — 36415 COLL VENOUS BLD VENIPUNCTURE: CPT

## 2020-08-07 PROCEDURE — 2700000000 HC OXYGEN THERAPY PER DAY

## 2020-08-07 PROCEDURE — 85018 HEMOGLOBIN: CPT

## 2020-08-07 PROCEDURE — 6370000000 HC RX 637 (ALT 250 FOR IP): Performed by: NURSE PRACTITIONER

## 2020-08-07 PROCEDURE — 80048 BASIC METABOLIC PNL TOTAL CA: CPT

## 2020-08-07 PROCEDURE — 86850 RBC ANTIBODY SCREEN: CPT

## 2020-08-07 PROCEDURE — 86900 BLOOD TYPING SEROLOGIC ABO: CPT

## 2020-08-07 PROCEDURE — P9016 RBC LEUKOCYTES REDUCED: HCPCS

## 2020-08-07 PROCEDURE — 85660 RBC SICKLE CELL TEST: CPT

## 2020-08-07 PROCEDURE — 86901 BLOOD TYPING SEROLOGIC RH(D): CPT

## 2020-08-07 PROCEDURE — 2580000003 HC RX 258: Performed by: NURSE PRACTITIONER

## 2020-08-07 PROCEDURE — 94761 N-INVAS EAR/PLS OXIMETRY MLT: CPT

## 2020-08-07 PROCEDURE — 36430 TRANSFUSION BLD/BLD COMPNT: CPT

## 2020-08-07 RX ORDER — ACETAMINOPHEN 325 MG/1
650 TABLET ORAL ONCE
Status: COMPLETED | OUTPATIENT
Start: 2020-08-07 | End: 2020-08-07

## 2020-08-07 RX ORDER — HYDROMORPHONE HCL 110MG/55ML
2 PATIENT CONTROLLED ANALGESIA SYRINGE INTRAVENOUS
Status: DISCONTINUED | OUTPATIENT
Start: 2020-08-07 | End: 2020-08-07

## 2020-08-07 RX ORDER — 0.9 % SODIUM CHLORIDE 0.9 %
20 INTRAVENOUS SOLUTION INTRAVENOUS ONCE
Status: DISCONTINUED | OUTPATIENT
Start: 2020-08-07 | End: 2020-08-26 | Stop reason: HOSPADM

## 2020-08-07 RX ORDER — HYDROMORPHONE HCL 110MG/55ML
2 PATIENT CONTROLLED ANALGESIA SYRINGE INTRAVENOUS
Status: DISCONTINUED | OUTPATIENT
Start: 2020-08-07 | End: 2020-08-11

## 2020-08-07 RX ORDER — METHADONE HYDROCHLORIDE 5 MG/1
5 TABLET ORAL 3 TIMES DAILY
Status: DISCONTINUED | OUTPATIENT
Start: 2020-08-07 | End: 2020-08-07

## 2020-08-07 RX ORDER — DEXTROSE MONOHYDRATE 50 MG/ML
INJECTION, SOLUTION INTRAVENOUS
Status: COMPLETED
Start: 2020-08-07 | End: 2020-08-07

## 2020-08-07 RX ORDER — HYDROMORPHONE HCL 110MG/55ML
1 PATIENT CONTROLLED ANALGESIA SYRINGE INTRAVENOUS
Status: DISCONTINUED | OUTPATIENT
Start: 2020-08-07 | End: 2020-08-07

## 2020-08-07 RX ORDER — METHADONE HYDROCHLORIDE 10 MG/1
10 TABLET ORAL 3 TIMES DAILY
Status: DISCONTINUED | OUTPATIENT
Start: 2020-08-07 | End: 2020-08-21

## 2020-08-07 RX ORDER — OXYCODONE HYDROCHLORIDE 5 MG/1
10 TABLET ORAL EVERY 4 HOURS PRN
Status: DISCONTINUED | OUTPATIENT
Start: 2020-08-07 | End: 2020-08-10

## 2020-08-07 RX ORDER — DIPHENHYDRAMINE HCL 25 MG
25 TABLET ORAL ONCE
Status: COMPLETED | OUTPATIENT
Start: 2020-08-07 | End: 2020-08-07

## 2020-08-07 RX ADMIN — SODIUM CHLORIDE: 9 INJECTION, SOLUTION INTRAVENOUS at 14:22

## 2020-08-07 RX ADMIN — VANCOMYCIN HYDROCHLORIDE 1000 MG: 1 INJECTION, POWDER, LYOPHILIZED, FOR SOLUTION INTRAVENOUS at 14:24

## 2020-08-07 RX ADMIN — CEFEPIME HYDROCHLORIDE 2 G: 2 INJECTION, POWDER, FOR SOLUTION INTRAVENOUS at 20:36

## 2020-08-07 RX ADMIN — HYDROMORPHONE HYDROCHLORIDE 2 MG: 2 INJECTION, SOLUTION INTRAMUSCULAR; INTRAVENOUS; SUBCUTANEOUS at 09:58

## 2020-08-07 RX ADMIN — Medication 10 ML: at 09:59

## 2020-08-07 RX ADMIN — HYDROMORPHONE HYDROCHLORIDE 2 MG: 2 INJECTION, SOLUTION INTRAMUSCULAR; INTRAVENOUS; SUBCUTANEOUS at 13:21

## 2020-08-07 RX ADMIN — Medication 10 ML: at 00:28

## 2020-08-07 RX ADMIN — METHADONE HYDROCHLORIDE 10 MG: 10 TABLET ORAL at 14:21

## 2020-08-07 RX ADMIN — VANCOMYCIN HYDROCHLORIDE 1000 MG: 1 INJECTION, POWDER, LYOPHILIZED, FOR SOLUTION INTRAVENOUS at 05:11

## 2020-08-07 RX ADMIN — ENOXAPARIN SODIUM 40 MG: 40 INJECTION SUBCUTANEOUS at 11:25

## 2020-08-07 RX ADMIN — HYDROMORPHONE HYDROCHLORIDE 0.5 MG: 2 INJECTION, SOLUTION INTRAMUSCULAR; INTRAVENOUS; SUBCUTANEOUS at 01:42

## 2020-08-07 RX ADMIN — FOLIC ACID 1 MG: 1 TABLET ORAL at 09:52

## 2020-08-07 RX ADMIN — OXYCODONE 10 MG: 5 TABLET ORAL at 11:12

## 2020-08-07 RX ADMIN — DIPHENHYDRAMINE HCL 25 MG: 25 TABLET ORAL at 18:27

## 2020-08-07 RX ADMIN — CEFEPIME HYDROCHLORIDE 2 G: 2 INJECTION, POWDER, FOR SOLUTION INTRAVENOUS at 09:52

## 2020-08-07 RX ADMIN — OXYCODONE 10 MG: 5 TABLET ORAL at 23:52

## 2020-08-07 RX ADMIN — SODIUM CHLORIDE: 9 INJECTION, SOLUTION INTRAVENOUS at 01:42

## 2020-08-07 RX ADMIN — ACETAMINOPHEN 650 MG: 325 TABLET ORAL at 09:52

## 2020-08-07 RX ADMIN — METHADONE HYDROCHLORIDE 10 MG: 10 TABLET ORAL at 20:37

## 2020-08-07 RX ADMIN — METHADONE HYDROCHLORIDE 5 MG: 5 TABLET ORAL at 05:10

## 2020-08-07 RX ADMIN — HYDROMORPHONE HYDROCHLORIDE 0.5 MG: 2 INJECTION, SOLUTION INTRAMUSCULAR; INTRAVENOUS; SUBCUTANEOUS at 06:57

## 2020-08-07 RX ADMIN — ACETAMINOPHEN 650 MG: 325 TABLET ORAL at 18:27

## 2020-08-07 RX ADMIN — HYDROMORPHONE HYDROCHLORIDE 2 MG: 2 INJECTION, SOLUTION INTRAMUSCULAR; INTRAVENOUS; SUBCUTANEOUS at 16:43

## 2020-08-07 RX ADMIN — METHADONE HYDROCHLORIDE 5 MG: 5 TABLET ORAL at 00:14

## 2020-08-07 ASSESSMENT — PAIN SCALES - GENERAL
PAINLEVEL_OUTOF10: 7
PAINLEVEL_OUTOF10: 8
PAINLEVEL_OUTOF10: 7
PAINLEVEL_OUTOF10: 8
PAINLEVEL_OUTOF10: 7

## 2020-08-07 ASSESSMENT — PAIN DESCRIPTION - LOCATION
LOCATION: LEG
LOCATION: LEG

## 2020-08-07 ASSESSMENT — PAIN DESCRIPTION - PAIN TYPE
TYPE: ACUTE PAIN
TYPE: ACUTE PAIN

## 2020-08-07 ASSESSMENT — PAIN DESCRIPTION - FREQUENCY: FREQUENCY: CONTINUOUS

## 2020-08-07 ASSESSMENT — PAIN DESCRIPTION - ORIENTATION
ORIENTATION: RIGHT;LEFT
ORIENTATION: RIGHT;LEFT

## 2020-08-07 ASSESSMENT — PAIN DESCRIPTION - PROGRESSION: CLINICAL_PROGRESSION: NOT CHANGED

## 2020-08-07 NOTE — PROGRESS NOTES
Pt calling out for pain medication. Pt medicated as ordered (see MAR). Pt immediately begins snoring after medication administration. VSS. Will continue to monitor.

## 2020-08-07 NOTE — PROGRESS NOTES
Hospitalist Progress Note      PCP: Cherri Palmer MD    Date of Admission: 8/6/2020    Chief Complaint:    Sickle cell pain      Hospital Course: H&P reviewed. Patient with history of known sickle cell anemia who presented with generalized body pain involving his knees, upper legs, and ribs which he felt was due to exacerbation of his sickle cell anemia. Noted to be febrile in the ER no overt source of infection identified. He was admitted for further care. Hematology consulted    Subjective:     Patient complained of severe pain in his knees, ribs not improved on current pain meds. Usually on methadone at home which has been continued here. Medications:  Reviewed    Infusion Medications    dextrose      sodium chloride 100 mL/hr at 08/07/20 1422     Scheduled Medications    vancomycin  1,000 mg Intravenous Q8H    sodium chloride  20 mL Intravenous Once    diphenhydrAMINE  25 mg Oral Once    methadone  10 mg Oral TID    folic acid  1 mg Oral Daily    sodium chloride flush  10 mL Intravenous 2 times per day    enoxaparin  40 mg Subcutaneous Daily    cefepime  2 g Intravenous Q12H     PRN Meds: oxyCODONE, [DISCONTINUED] HYDROmorphone **OR** HYDROmorphone, sodium chloride flush, acetaminophen **OR** acetaminophen      Intake/Output Summary (Last 24 hours) at 8/7/2020 1515  Last data filed at 8/7/2020 1337  Gross per 24 hour   Intake 82794.87 ml   Output 2725 ml   Net 9133.87 ml       Physical Exam Performed:    /67   Pulse 105   Temp 99.4 °F (37.4 °C) (Oral)   Resp 16   Ht 5' 8\" (1.727 m)   Wt 159 lb 2.8 oz (72.2 kg)   SpO2 98%   BMI 24.20 kg/m²     General appearance: No apparent respiratory distress, appears stated age and cooperative. HEENT: Pupils equal, round, Conjunctivae/corneas clear. Neck: Supple, with full range of motion. No jugular venous distention. Trachea midline. Respiratory:  Normal respiratory effort.  Clear to auscultation, bilaterally without Rales/Wheezes/Rhonchi. Cardiovascular: Regular rate and rhythm with normal S1/S2 without murmurs, rubs or gallops. Abdomen: Soft, non-tender, non-distended with normal bowel sounds. Musculoskeletal: No clubbing, cyanosis or edema bilaterally. Skin: Warm and dry  Neurologic: Limited due to lethargy and somnolence but easily arousable and answering questions appropriately. Moving all extremities spontaneously  Psychiatric: Sleepy      Labs:   Recent Labs     08/06/20 1930 08/07/20  0832   WBC 20.7* 17.3*   HGB 7.8* 6.8*   HCT 22.2* 19.3*    240     Recent Labs     08/06/20 1930 08/07/20  0630   * 133*   K 4.1 5.0   CL 97* 103   CO2 22 18*   BUN 15 11   CREATININE 0.8* 0.6*   CALCIUM 9.5 9.1     Recent Labs     08/06/20 1930   AST 35   ALT 14   BILITOT 7.4*   ALKPHOS 51     Recent Labs     08/06/20 1930   INR 1.44*     No results for input(s): Chinyere Beck in the last 72 hours. Urinalysis:      Lab Results   Component Value Date    NITRU Negative 08/06/2020    WBCUA None seen 08/06/2020    RBCUA 0-2 08/06/2020    BLOODU TRACE-LYSED 08/06/2020    SPECGRAV <=1.005 08/06/2020    GLUCOSEU Negative 08/06/2020       Radiology:  CT CHEST PULMONARY EMBOLISM W CONTRAST   Final Result   No evidence of pulmonary embolism or acute pulmonary abnormality. XR CHEST PORTABLE   Final Result   No convincing acute cardiopulmonary abnormality. Assessment/Plan:    Active Hospital Problems    Diagnosis    Sepsis ([facility] [address] [A41.9]    Sickle cell crisis ([facility] [address] [D57.00]       Sepsis: met SIRS criteria with no overt source of infection identified. Lactic acid was normal.  Recently hospitalized for pneumonia. Continue empiric abx while awaiting cultures, COVID 19 test pending. Sickle cell crisis with anemia : [facility]monc assisting. Recs appreciated. Getting PRBC for hgb 6.8. monitor hgb and transfuse per hemonc recs. Pain not controlled.   Increased frequency of IV dilaudid for breakthrough pain.  Hold for somnolence. Continue home methadone. Hyponatremia : likely due to vol depletion. Improving with IVF.                        DVT Prophylaxis: lovenox  Diet: DIET GENERAL;  Code Status: Full Code    PT/OT Eval Status: not indicated at this time         Dispo -  2-5 days pending clinical ourse     Pamela Santos MD

## 2020-08-07 NOTE — CONSULTS
Pharmacy to Dose Vancomycin    Dx: pneumonia  Goal trough = >15-20 mcg/mL  Pt wt = 77.1 kg  Estimated Creatinine Clearance: 141 mL/min (A) (based on SCr of 0.8 mg/dL (L)). Vancomycin 1250 mg IVPB x 1   Start Vancomycin 1000 mg IVPB q8h. Vancomycin trough prior to 4th dose (8/7 2000).   Brian Vital Pharm I.2/9/6017 1:04 AM

## 2020-08-07 NOTE — CARE COORDINATION
Chart reviewed for possible needs at DC. Pt was recently [address] 7/30/20 for [address]. He was admitted 8/6/20 for sepsis being followed by IM and Hemoc. He is currently on 2L/NC with no baseline requirement. Per chart, pt from home and IPTA. He has PCP and current insurance listed. No immediate DC needs identified at this time. Please consult CM should needs arise. Of note, pt does have Covid test pending.      Autumn Kraus RN

## 2020-08-07 NOTE — PROGRESS NOTES
Pt unsure if he wants lovenox or not. Educated pt on purpose and potential side effects. Pt stated he will get a second opinion and then called his parents for their opinion. Educated pt and parents on purpose and potential side effects of lovenox. Pt and parents verbalized understanding of teaching. Lovenox administered per order. Pt tolerated well. Will continue to monitor.

## 2020-08-07 NOTE — PLAN OF CARE
Assess pain with hourly rounding and PRN. Educate pt on pharm and nonpharm pain management techniques. Monitor vital signs per protocol and PRN.

## 2020-08-07 NOTE — PROGRESS NOTES
Pt called out requesting pain medication. Pt educated that he received 0.5 mg Dilaudid IVP @ 0657 (see MAR). Pt also received 5 mg Methadone PO @ 0510 as ordered for pain (see MAR).

## 2020-08-07 NOTE — ED PROVIDER NOTES
[facility]  ED  EMERGENCY DEPARTMENT ENCOUNTER        Pt Name: Wilma Melgar  MRN: [id number]  [place] 2000  Date of evaluation: 8/6/2020  Provider: Nurys Quesada PA-C  PCP: Pito Rodrigues MD  ED Attending: Stormy Loo MD    This patient was also seen by the ED attending, Dr. Stormy Loo    History provided by the patient    CHIEF COMPLAINT:     Chief Complaint   Patient presents with    Sickle Cell Pain Crisis     Patient comes into ED with c/o sickle cell pain that started last night. HISTORY OF PRESENT ILLNESS:      Wilma Melgar is a 23 y.o. male who arrives to the ED by private vehicle. Patient reports a history of sickle cell anemia. He was hospitalized 2 to 3 weeks ago with sickle cell crisis and pneumonia. Since last night he has been experiencing pain that he attributes to his sickle cell. The pain is located primarily in his knees and upper legs as well as to his \"ribs\". Pain is rated 10/10 on arrival.  He cannot identify exacerbating or alleviating factors. On arrival to the ED he has a temperature of 102.1 °F.  He is no longer on antibiotics from his recent diagnosis of pneumonia. He is not complaining of any coughing or shortness of breath per se. He has not been around anyone known to be ill. Nursing Notes were reviewed     REVIEW OF SYSTEMS:     Review of Systems   Constitutional: Positive for activity change, appetite change, diaphoresis, fatigue and fever. HENT: Negative. Eyes: Negative. Respiratory: Negative for cough and shortness of breath. Cardiovascular: Positive for chest pain (\"rib pain\"). Gastrointestinal: Negative for abdominal pain, nausea and vomiting. Genitourinary: Negative for dysuria. Musculoskeletal: Positive for arthralgias (knees) and myalgias. Negative for back pain, joint swelling and neck pain. Skin: Negative for color change and rash. Neurological: Negative for headaches.    All other systems reviewed and PHYSICAL EXAM:       ED Triage Vitals [08/06/20 1853]   BP Temp Temp Source Pulse Resp SpO2 Height Weight   (!) 188/60 102.1 °F (38.9 °C) Oral 105 20 99 % 5' 8\" (1.727 m) 170 lb (77.1 kg)       Physical Exam    CONSTITUTIONAL: Awake and alert. Cooperative. Well-developed. Well-nourished. Non-toxic. No acute distress. HENT: Normocephalic. Atraumatic. External ears normal, without discharge. No nasal discharge. Oropharynx clear. Mucous membranes moist.  EYES: Conjunctiva non-injected. No scleral icterus. PERRL. EOM's grossly intact. NECK: Supple. Normal ROM. CARDIOVASCULAR: RRR. No Murmer. Intact distal pulses. PULMONARY/CHEST WALL: Effort normal. No tachypnea. Lungs clear to ausculation. ABDOMEN: Normal BS. Soft. Nondistended. No tenderness to palpate. No guarding. /ANORECTAL: Not assessed  MUSKULOSKELETAL: Normal ROM. No acute deformities. No edema. No tenderness to palpate. SKIN: Warm and dry. No rash. NEUROLOGICAL: Alert and oriented x 3. GCS 15. CN II-XII grossly intact. Strength is 5/5 in all extremities and sensation is intact. Normal gait.    PSYCHIATRIC: Normal affect        DIAGNOSTICRESULTS:     LABS:    Results for orders placed or performed during the hospital encounter of 08/06/20   CBC Auto Differential   Result Value Ref Range    WBC 20.7 (H) 4.0 - 11.0 K/uL    RBC 2.01 (L) 4.20 - 5.90 M/uL    Hemoglobin 7.8 (L) 13.5 - 17.5 g/dL    Hematocrit 22.2 (L) 40.5 - 52.5 %    .5 (H) 80.0 - 100.0 fL    MCH 38.7 (H) 26.0 - 34.0 pg    MCHC 35.0 31.0 - 36.0 g/dL    RDW 23.2 (H) 12.4 - 15.4 %    Platelets 900 908 - 668 K/uL    MPV 8.8 5.0 - 10.5 fL    PLATELET SLIDE REVIEW Adequate     SLIDE REVIEW see below     Path Consult No     Neutrophils % 76.0 %    Lymphocytes % 9.0 %    Monocytes % 13.0 %    Eosinophils % 0.0 %    Basophils % 1.0 %    Neutrophils Absolute 15.7 (H) 1.7 - 7.7 K/uL    Lymphocytes Absolute 2.1 1.0 - 5.1 K/uL    Monocytes Absolute 2.7 (H) 0.0 - 1.3 K/uL    Eosinophils Absolute 0.0 0.0 - 0.6 K/uL    Basophils Absolute 0.2 0.0 - 0.2 K/uL    Atypical Lymphocytes Relative 1 0 - 6 %    nRBC 2 (A) /100 WBC    nRBC 2 (A) /100 WBC    Anisocytosis 2+ (A)     Macrocytes 1+ (A)     Microcytes Occasional (A)     Polychromasia Occasional (A)     Poikilocytes 2+ (A)     Schistocytes Occasional (A)     Ovalocytes Occasional (A)     Target Cells Occasional (A)     Tear Drop Cells Occasional (A)     Sickle Cells 2+ (A)    Comprehensive Metabolic Panel w/ Reflex to MG   Result Value Ref Range    Sodium 130 (L) 136 - 145 mmol/L    Potassium reflex Magnesium 4.1 3.5 - 5.1 mmol/L    Chloride 97 (L) 99 - 110 mmol/L    CO2 22 21 - 32 mmol/L    Anion Gap 11 3 - 16    Glucose 89 70 - 99 mg/dL    BUN 15 7 - 20 mg/dL    CREATININE 0.8 (L) 0.9 - 1.3 mg/dL    GFR Non-African American >60 >60    GFR African American >60 >60    Calcium 9.5 8.3 - 10.6 mg/dL    Total Protein 7.2 6.4 - 8.2 g/dL    Alb 4.5 3.4 - 5.0 g/dL    Albumin/Globulin Ratio 1.7 1.1 - 2.2    Total Bilirubin 7.4 (H) 0.0 - 1.0 mg/dL    Alkaline Phosphatase 51 40 - 129 U/L    ALT 14 10 - 40 U/L    AST 35 15 - 37 U/L    Globulin 2.7 g/dL   Reticulocytes   Result Value Ref Range    Retic Ct Pct 9.58 (H) 0.50 - 2.18 %    Retic Ct Abs 0.192 M/uL    Immature Retic Fract 0.73 (H) 0.21 - 0.37   Protime-INR   Result Value Ref Range    Protime 16.8 (H) 10.0 - 13.2 sec    INR 1.44 (H) 0.86 - 1.14   Lactic Acid, Plasma   Result Value Ref Range    Lactic Acid 0.6 0.4 - 2.0 mmol/L   Urinalysis Reflex to Culture    Specimen: Urine, clean catch   Result Value Ref Range    Color, UA Yellow Straw/Yellow    Clarity, UA Clear Clear    Glucose, Ur Negative Negative mg/dL    Bilirubin Urine Negative Negative    Ketones, Urine Negative Negative mg/dL    Specific Gravity, UA <=1.005 1.005 - 1.030    Blood, Urine TRACE-LYSED (A) Negative    pH, UA 6.0 5.0 - 8.0    Protein, UA Negative Negative mg/dL    Urobilinogen, Urine 0.2 <2.0 E.U./dL    Nitrite, Urine Negative Negative    Leukocyte Esterase, Urine Negative Negative    Microscopic Examination YES     Urine Type NotGiven     Urine Reflex to Culture Not Indicated    Procalcitonin   Result Value Ref Range    Procalcitonin 0.77 (H) 0.00 - 0.15 ng/mL   Microscopic Urinalysis   Result Value Ref Range    WBC, UA None seen 0 - 5 /HPF    RBC, UA 0-2 0 - 4 /HPF         RADIOLOGY:  All x-ray studies areviewed/reviewed by me. Formal interpretations per the radiologist are as follows:      Xr Chest Portable    Result Date: 8/6/2020  EXAMINATION: ONE XRAY VIEW OF THE CHEST 8/6/2020 7:03 pm COMPARISON: 07/25/2020. HISTORY: ORDERING SYSTEM PROVIDED HISTORY: chest pain, sickle cell, fever TECHNOLOGIST PROVIDED HISTORY: Reason for exam:->chest pain, sickle cell, fever Reason for Exam: chest pain, sickle cell, fever Acuity: Acute Type of Exam: Initial FINDINGS: The cardiac silhouette appears at the upper limits of normal for size. No focal consolidation is seen. There is no pleural effusion or pneumothorax. No convincing acute cardiopulmonary abnormality. Ct Chest Pulmonary Embolism W Contrast    Result Date: 8/6/2020  EXAMINATION: CTA OF THE CHEST 8/6/2020 8:45 pm TECHNIQUE: CTA of the chest was performed after the administration of intravenous contrast.  Multiplanar reformatted images are provided for review. MIP images are provided for review. Dose modulation, iterative reconstruction, and/or weight based adjustment of the mA/kV was utilized to reduce the radiation dose to as low as reasonably achievable. COMPARISON: None. HISTORY: ORDERING SYSTEM PROVIDED HISTORY: Chest pain, hypoxia. Sickle cell patient with fever and recent pneumonia TECHNOLOGIST PROVIDED HISTORY: Reason for exam:->Chest pain, hypoxia. Sickle cell patient with fever and recent pneumonia Reason for Exam: Chest pain, hypoxia.   Sickle cell patient with fever and recent pneumonia Acuity: Acute Type of Exam: Initial FINDINGS: Pulmonary Arteries: Pulmonary records were reviewed. The patient arrives to the ED reporting pain that he attributes to sickle cell anemia. He was hospitalized recently with sickle cell crisis and pneumonia. On arrival patient is febrile at 102.1 °F.  He was unaware of fever and is not experiencing any acute respiratory symptoms, GI upset or  symptoms. Given his abnormal vital signs and history of sickle cell a broad work-up was done. IV was established and normal saline per sepsis protocol 30 mls per kilogram are ordered. On CBC patient has an elevated white blood cell count of 20.7. H&H 7.8 and 22.2. Platelet count 359. CMP reveals mild hyponatremia at 130. Reticulocytes elevated. See lab results above. PT 16.8 and INR 1.44  Lactate normal at 0.6. Procalcitonin elevated at 0.77. Urinalysis is unremarkable. Portable chest x-ray normal.  CT chest PE protocol is negative. The patient was ordered Dilaudid IV for pain in the ED. Again he was given IV fluids per sepsis protocol. He is covered with vancomycin and cefepime secondary to arriving with fever, having leukocytosis and elevated procalcitonin. Blood cultures are drawn prior to initiating antibiotics. This patient's presentation is consistent with sickle cell crisis as well as concerning for sepsis. I will consult the hospitalist for admission. Patient has been hemodynamically stable in the ED. I spoke with LYNNETTE Fontana. We thoroughly discussed the history, physical exam, laboratory and imaging studies, as well as, emergency department course. Based upon that discussion, we've decided to admit [facility] for further observation and evaluation of Javier Javed's sickle cell crisis and acute infection. As I have deemed necessary from their history, physical, and studies, I have considered and evaluated [facility] for the following diagnoses: SICKLE CELL CRISIS, SEPSIS, PNEUMONIA, PE, CELLULITIS. FINAL IMPRESSION:      1. Sickle cell crisis ([address]    2.

## 2020-08-07 NOTE — PROGRESS NOTES
Pt IV beeping, this RN entered room,, Pt inappropriately touching himself and continues to do so when this writer enters room. This writer requested pt stop behavior. Pt continues. Pt does not appear to be in any distress at this time. Pt educated that Rico Misa will not enter room if pt continues with behavior. Pt only looks at Rico Misa and continues behavior. This writer exits room. Will monitor pt via telemetry.

## 2020-08-07 NOTE — H&P
Enlarged heart     Sickle cell anemia (HCC)      Past Surgical History:          Procedure Laterality Date    SPLENECTOMY       Medications Prior to Admission:      Prior to Admission medications    Medication Sig Start Date End Date Taking? Authorizing Provider   folic acid (FOLVITE) 1 MG tablet Take 1 mg by mouth daily    Historical Provider, MD   hydroxyurea (HYDREA) 500 MG chemo capsule Take by mouth daily Unsure of dose    Historical Provider, MD   methadone (DOLOPHINE) 5 MG tablet Take 5 mg by mouth every 4 hours as needed for Pain. Historical Provider, MD     Allergies:  Morphine    Social History:      The patient currently lives at home    TOBACCO:   reports that he has never smoked. He has never used smokeless tobacco.  ETOH:   reports previous alcohol use. E-Cigarettes Vaping or Juuling     Questions Responses    Vaping Use Never User    Start Date     Does device contain nicotine? Never    Quit Date     Vaping Type         Family History:      History reviewed. No pertinent family history. REVIEW OF SYSTEMS:   Pertinent positives as noted in the HPI. All other systems reviewed and negative. PHYSICAL EXAM PERFORMED:    BP (!) 109/56   Pulse 79   Temp 99.2 °F (37.3 °C) (Oral)   Resp 17   Ht 5' 8\" (1.727 m)   Wt 170 lb (77.1 kg)   SpO2 98%   BMI 25.85 kg/m²     General appearance: Pleasant male in no apparent distress, appears stated age and cooperative. HEENT:   Pupils equal, round, and reactive to light. Extra ocular muscles intact. Conjunctivae/corneas clear. Neck: Supple, with full range of motion. No jugular venous distention. Trachea midline. Respiratory:  Normal respiratory effort. Clear to auscultation, bilaterally without Rales/Wheezes/Rhonchi. Cardiovascular:  Regular rate and rhythm with normal S1/S2 without murmurs, rubs or gallops. Abdomen: Soft, non-tender, non-distended with normal bowel sounds. Musculoskeletal:  No clubbing, cyanosis or edema bilaterally.   Full range of motion without deformity. Skin: Skin color, texture, turgor normal.  No significant rashes or lesions. Neurologic:  Neurovascularly intact. Cranial nerves: II-XII intact, grossly non-focal.  Psychiatric:  Alert and oriented, thought content appropriate, normal insight  Capillary Refill: Brisk,< 3 seconds   Peripheral Pulses: +2 palpable, equal bilaterally     Labs:     Recent Labs     08/06/20 1930   WBC 20.7*   HGB 7.8*   HCT 22.2*        Recent Labs     08/06/20 1930   *   K 4.1   CL 97*   CO2 22   BUN 15   CREATININE 0.8*   CALCIUM 9.5     Recent Labs     08/06/20 1930   AST 35   ALT 14   BILITOT 7.4*   ALKPHOS 51     Recent Labs     08/06/20 1930   INR 1.44*     Urinalysis:      Lab Results   Component Value Date    NITRU Negative 08/06/2020    WBCUA None seen 08/06/2020    RBCUA 0-2 08/06/2020    BLOODU TRACE-LYSED 08/06/2020    SPECGRAV <=1.005 08/06/2020    GLUCOSEU Negative 08/06/2020     Radiology:     CXR: I have reviewed the CXR with the following interpretation: No acute cardiopulmonary findings    EKG:  I have reviewed the EKG with the following interpretation: N/A    CT CHEST PULMONARY EMBOLISM W CONTRAST   Final Result   No evidence of pulmonary embolism or acute pulmonary abnormality. XR CHEST PORTABLE   Final Result   No convincing acute cardiopulmonary abnormality. ASSESSMENT:    Active Hospital Problems    Diagnosis Date Noted    Sickle cell crisis ([facility] [address] [D57.00] 07/28/2020     PLAN:    Sepsis in setting of unknown etiology, temp 102.1, heart rate 105, leukocytosis 20.7 on admission  -Chest x-ray revealed no acute cardiopulmonary findings  -CT chest pulmonary embolism revealed no evidence of pulmonary embolism or acute pulmonary abnormality.   -UA negative for infection  -blood cultures ordered in ED  -Repeat lactic  -COVID-19 testing  -Procalcitonin 0.77  -30ml/kg IVF bolus given in ED  -tylenol given in ED  -maxipime and vancomycin given in ED and

## 2020-08-07 NOTE — PROGRESS NOTES
Unable to obtain blood for lab draw. Spoke with Suki Borden regarding assistance. Will come to floor to attempt to obtain samples.

## 2020-08-07 NOTE — CONSULTS
Hematology/Oncology Consultation         Patient:   [facility]       Reason for Consult:  Sickle cell pain crisis   Requesting Physician: No ref. provider found    Chief Complaint:     Chief Complaint   Patient presents with    Sickle Cell Pain Crisis     Patient comes into ED with c/o sickle cell pain that started last night. History Obtained from:     patient, electronic medical record    History of Present Illness:     [facility] is a 23 y.o. male  with significant past medical history of sickle jairo anemia who presents with worsening pain in his knees and fever up to 102. He was just released from the hospital on 7-30. He is now in a [facility] [place] [facility] in [facility]. On Cefepime and Vanco. Blood cx are pending. No chest pain or HA. CTPA no infarction. Hgb is 6.8. Kidney and liver panels are unremarkable. He is 93 percent on RA.      Past Medical History:         Diagnosis Date    Asthma     Enlarged heart     Sickle cell anemia (HCC)        Past Surgical History:         Procedure Laterality Date    SPLENECTOMY         Current Medications:     Current Facility-Administered Medications   Medication Dose Route Frequency Provider Last Rate Last Dose    vancomycin 1000 mg IVPB in 250 mL D5W addavial  1,000 mg Intravenous Q8H CARMELITA Marie CNP   Stopped at 08/07/20 0614    0.9 % sodium chloride bolus  20 mL Intravenous Once CARMELITA Leiva - CNP        acetaminophen (TYLENOL) tablet 650 mg  650 mg Oral Once CARMELITA Leiva - CNP        diphenhydrAMINE (BENADRYL) tablet 25 mg  25 mg Oral Once CARMELITA Leiva - CNP        methadone (DOLOPHINE) tablet 5 mg  5 mg Oral TID CARMELITA Leiva CNP        oxyCODONE (ROXICODONE) immediate release tablet 10 mg  10 mg Oral Q4H PRN CARMELITA Leiva - CNP        HYDROmorphone (DILAUDID) injection 2 mg  2 mg Intravenous Q3H PRN CARMELITA Leiva - CNP        folic acid (Shana Amanda) tablet 1 mg  1 mg Oral Daily Shearon Bourgepipe, APRN - CNP        sodium chloride flush 0.9 % injection 10 mL  10 mL Intravenous 2 times per day Shearon Bourgeois, APRN - CNP   10 mL at 08/07/20 0028    sodium chloride flush 0.9 % injection 10 mL  10 mL Intravenous PRN Shearon Bourgeois, APRN - CNP        acetaminophen (TYLENOL) tablet 650 mg  650 mg Oral Q6H PRN Shearon Bourgeois, APRN - CNP        Or    acetaminophen (TYLENOL) suppository 650 mg  650 mg Rectal Q6H PRN Shearon Bourgeois, APRN - CNP        enoxaparin (LOVENOX) injection 40 mg  40 mg Subcutaneous Daily Shearon Bourgeois, APRN - CNP        cefepime (MAXIPIME) 2 g IVPB minibag  2 g Intravenous Q12H Shearon Bourgeois, APRN - CNP        0.9 % sodium chloride infusion   Intravenous Continuous Shearon Bourgeois, APRN -  mL/hr at 08/07/20 0142         Allergies:      Allergies   Allergen Reactions    Morphine Shortness Of Breath     Other reaction(s): Histamine-like Reaction  Has asthma exacerbation with morphine-histamine type reaction         Social History:     Social History     Socioeconomic History    Marital status: Single     Spouse name: Not on file    Number of children: Not on file    Years of education: Not on file    Highest education level: Not on file   Occupational History    Not on file   Social Needs    Financial resource strain: Not on file    Food insecurity     Worry: Not on file     Inability: Not on file    Transportation needs     Medical: Not on file     Non-medical: Not on file   Tobacco Use    Smoking status: Never Smoker    Smokeless tobacco: Never Used   Substance and Sexual Activity    Alcohol use: Not Currently    Drug use: Never    Sexual activity: Not on file   Lifestyle    Physical activity     Days per week: Not on file     Minutes per session: Not on file    Stress: Not on file   Relationships    Social connections     Talks on phone: Not on file     Gets together: Not on file     Attends Restorationist service: Not on file     Active member of club or organization: Not on file     Attends meetings of clubs or organizations: Not on file     Relationship status: Not on file    Intimate partner violence     Fear of current or ex partner: Not on file     Emotionally abused: Not on file     Physically abused: Not on file     Forced sexual activity: Not on file   Other Topics Concern    Not on file   Social History Narrative    Not on file     Social History     Substance and Sexual Activity   Drug Use Never     Social History     Substance and Sexual Activity   Alcohol Use Not Currently     Social History     Substance and Sexual Activity   Sexual Activity Not on file     Social History     Tobacco Use   Smoking Status Never Smoker   Smokeless Tobacco Never Used       Family History:     History reviewed. No pertinent family history. Review of Systems:     Constitutional: Denies fever, sweats, weight loss. .     Eyes: No visual changes or diplopia. No scleral icterus. ENT: No Headaches, no hearing loss, no  vertigo. No mouth sores or sore throat. Cardiovascular: No chest pain, no dyspnea on exertion, no palpitations, no  loss of consciousness. Respiratory: No cough, no  wheezing, no dyspnea, no sputum production. No hemoptysis. .    Gastrointestinal: No abdominal pain, no appetite loss, no blood in stools. No change in bowel habits. Genitourinary: No dysuria, trouble voiding, or hematuria. Musculoskeletal:  Bilateral Knee pain   Integumentary: No rash or pruritis. Neurological: No headache, diplopia. No change in gait, balance, or coordination. No paresthesias. Endocrine: No temperature intolerance. No excessive thirst, fluid intake, or urination. Hematologic/Lymphatic: No abnormal bruising or ecchymoses, no blood clots or swollen lymph nodes. Allergic/Immunologic: No nasal congestion or hives.      Physical Exam:     /63   Pulse 107   Temp 100.2 °F (37.9 °C) (Oral)   Resp 16   Ht 5' 8\" (1.727 m)   Wt 159 lb 2.8 oz (72.2 kg) SpO2 94%   BMI 24.20 kg/m²     PE: deferred due to COVID isolation rule out room per policy     Data:     CBC:  Recent Labs     08/07/20  0832 08/06/20 1930 07/30/20  0541   WBC 17.3* 20.7* 14.5*   HGB 6.8* 7.8* 8.9*   HCT 19.3* 22.2* 25.0*   .4* 110.5* 101.9*    291 409       BMP:  Recent Labs     08/07/20  0630 08/06/20 1930 07/30/20  0541 07/29/20  0551 07/28/20  0730   * 130* 139 141 139   K 5.0 4.1 4.5 4.7 4.2   CO2 18* 22 26 25 29   BUN 11 15 12 10 10   CREATININE 0.6* 0.8* 0.7* 0.7* 0.6*   MG  --   --  2.10 2.00 1.90       HEPATIC:  Recent Labs     08/06/20 1930 07/30/20  0541 07/29/20  0551  07/25/20  0325   AST 35 45* 40*   < > 34   ALT 14 23 18   < > 13   ALKPHOS 51 62 49   < > 59   PROT 7.2 7.1 6.3*   < > 7.1   BILITOT 7.4* 8.6* 6.1*   < > 5.2*   BILIDIR  --   --   --   --  0.3    < > = values in this interval not displayed. TUMOR MARKERS:  No results for input(s): PSA, CEA, , SG6003,  in the last 720 hours.       MAGNESIUM:    Lab Results   Component Value Date    MG 2.10 07/30/2020       PT/INR:    No results found for: PTINR    Lab Results   Component Value Date    INR 1.44 08/06/2020       PTT:    No results found for: APTT    U/A:    Lab Results   Component Value Date    COLORU Yellow 08/06/2020    PHUR 6.0 08/06/2020    WBCUA None seen 08/06/2020    RBCUA 0-2 08/06/2020    CLARITYU Clear 08/06/2020    SPECGRAV <=1.005 08/06/2020    LEUKOCYTESUR Negative 08/06/2020    UROBILINOGEN 0.2 08/06/2020    BILIRUBINUR Negative 08/06/2020    BLOODU TRACE-LYSED 08/06/2020    GLUCOSEU Negative 08/06/2020       Imaging:             HISTORY:    ORDERING SYSTEM PROVIDED HISTORY: Chest pain, hypoxia.  Sickle cell patient    with fever and recent pneumonia    TECHNOLOGIST PROVIDED HISTORY:    Reason for exam:->Chest pain, hypoxia.  Sickle cell patient with fever and    recent pneumonia    Reason for Exam: Chest pain, hypoxia.  Sickle cell patient with fever and    recent pneumonia    Acuity: Acute    Type of Exam: Initial         FINDINGS:    Pulmonary Arteries: Pulmonary arteries are adequately opacified for    evaluation.  No evidence of intraluminal filling defect to suggest pulmonary    embolism.  Main pulmonary artery is normal in caliber.         Mediastinum: No evidence of mediastinal lymphadenopathy.  The heart and    pericardium demonstrate no acute abnormality.  There is no acute abnormality    of the thoracic aorta.         Lungs/pleura: The lungs are without acute process.  No focal consolidation or    pulmonary edema.  No evidence of pleural effusion or pneumothorax.         Upper Abdomen: Spleen is not visualized.  Upper abdomen otherwise is    unremarkable.         Soft Tissues/Bones: No acute bone or soft tissue abnormality.              Impression    No evidence of pulmonary embolism or acute pulmonary abnormality. Impression:     Rule out covid   Sickle cell crisis with joint pain (knees)   Fever     Plan:     Sickle Cell Crisis Pain  - No indication for red cell exchange and no end organ damage on lab review. No chest pain or headache.   - Transfuse 1 unit PRBC today Hgb S negative. - Cont IV fluids  - CTPA negative for infarction   - Blood cx pending for temp 102  - Cont Cefepime and Vanco for empiric coverage  - Await COVID status   - Cont Hydrea home dose and folic acid     Pain   - Methadone changed to 10 TID per home dose managed by a pain specialist outpatient    - Oxy IR 10 mg PO every 4 hours PRN   - Dilaudid 2 mg IVP every 3 hours PRN    - If knee pain persists over weekend, consider XRAYS            Thank you very much for allowing me to participate in the care of this patient.     Jess Will CNP   Medical Oncology/Hematology    [facility] - [address],  [place]   Phone: [phone]  Fax: [phone]

## 2020-08-07 NOTE — ED NOTES
[facility] Hosp @ 2150  RE: Sickle cell crisis, sepsis per Jacklyn Chandler NP in the ED @ 2205       [facility]  08/06/20 2228

## 2020-08-07 NOTE — PROGRESS NOTES
4 Eyes Admission Assessment     I agree as the admission nurse that 2 RN's have performed a thorough Head to Toe Skin Assessment on the patient. ALL assessment sites listed below have been assessed on admission. Areas assessed by both nurses: Christy Best RN and Karlie Bansal RN  [x]   Head, Face, and Ears   [x]   Shoulders, Back, and Chest  [x]   Arms, Elbows, and Hands   [x]   Coccyx, Sacrum, and Ischum  [x]   Legs, Feet, and Heels        Does the Patient have Skin Breakdown?   No         Shaun Prevention initiated:  NA   Wound Care Orders initiated:  NA      WOC nurse consulted for Pressure Injury (Stage 3,4, Unstageable, DTI, NWPT, and Complex wounds):  NA      Nurse 1 eSignature: Electronically signed by Dalton Lopez RN on 8/7/20 at 2:20 AM EDT    **SHARE this note so that the co-signing nurse is able to place an eSignature**    Nurse 2 eSignature: Electronically signed by Gabriel Berg RN on 8/7/20 at 3:24 AM EDT

## 2020-08-08 LAB
ANION GAP SERPL CALCULATED.3IONS-SCNC: 10 MMOL/L (ref 3–16)
BASOPHILS ABSOLUTE: 0 K/UL (ref 0–0.2)
BASOPHILS RELATIVE PERCENT: 0 %
BUN BLDV-MCNC: 9 MG/DL (ref 7–20)
CALCIUM SERPL-MCNC: 9.2 MG/DL (ref 8.3–10.6)
CHLORIDE BLD-SCNC: 106 MMOL/L (ref 99–110)
CO2: 23 MMOL/L (ref 21–32)
CREAT SERPL-MCNC: 0.6 MG/DL (ref 0.9–1.3)
EOSINOPHILS ABSOLUTE: 0 K/UL (ref 0–0.6)
EOSINOPHILS RELATIVE PERCENT: 0 %
GFR AFRICAN AMERICAN: >60
GFR NON-AFRICAN AMERICAN: >60
GLUCOSE BLD-MCNC: 109 MG/DL (ref 70–99)
HCT VFR BLD CALC: 21.1 % (ref 40.5–52.5)
HEMATOLOGY PATH CONSULT: NO
HEMOGLOBIN: 7.3 G/DL (ref 13.5–17.5)
LYMPHOCYTES ABSOLUTE: 2.8 K/UL (ref 1–5.1)
LYMPHOCYTES RELATIVE PERCENT: 17 %
MACROCYTES: ABNORMAL
MCH RBC QN AUTO: 38.1 PG (ref 26–34)
MCHC RBC AUTO-ENTMCNC: 34.6 G/DL (ref 31–36)
MCV RBC AUTO: 110.2 FL (ref 80–100)
MICROCYTES: ABNORMAL
MONOCYTES ABSOLUTE: 1.3 K/UL (ref 0–1.3)
MONOCYTES RELATIVE PERCENT: 8 %
NEUTROPHILS ABSOLUTE: 12.3 K/UL (ref 1.7–7.7)
NEUTROPHILS RELATIVE PERCENT: 75 %
PDW BLD-RTO: 25.2 % (ref 12.4–15.4)
PLATELET # BLD: 221 K/UL (ref 135–450)
PLATELET SLIDE REVIEW: ADEQUATE
PMV BLD AUTO: 9.3 FL (ref 5–10.5)
POLYCHROMASIA: ABNORMAL
POTASSIUM REFLEX MAGNESIUM: 4.3 MMOL/L (ref 3.5–5.1)
RBC # BLD: 1.92 M/UL (ref 4.2–5.9)
SICKLE CELLS: ABNORMAL
SLIDE REVIEW: ABNORMAL
SODIUM BLD-SCNC: 139 MMOL/L (ref 136–145)
TARGET CELLS: ABNORMAL
TEAR DROP CELLS: ABNORMAL
VANCOMYCIN TROUGH: 4.3 UG/ML (ref 10–20)
WBC # BLD: 16.4 K/UL (ref 4–11)

## 2020-08-08 PROCEDURE — 85025 COMPLETE CBC W/AUTO DIFF WBC: CPT

## 2020-08-08 PROCEDURE — 1200000000 HC SEMI PRIVATE

## 2020-08-08 PROCEDURE — 6360000002 HC RX W HCPCS: Performed by: NURSE PRACTITIONER

## 2020-08-08 PROCEDURE — 2580000003 HC RX 258: Performed by: INTERNAL MEDICINE

## 2020-08-08 PROCEDURE — 6370000000 HC RX 637 (ALT 250 FOR IP): Performed by: NURSE PRACTITIONER

## 2020-08-08 PROCEDURE — 6360000002 HC RX W HCPCS: Performed by: INTERNAL MEDICINE

## 2020-08-08 PROCEDURE — 80048 BASIC METABOLIC PNL TOTAL CA: CPT

## 2020-08-08 PROCEDURE — 2580000003 HC RX 258: Performed by: NURSE PRACTITIONER

## 2020-08-08 PROCEDURE — 36415 COLL VENOUS BLD VENIPUNCTURE: CPT

## 2020-08-08 PROCEDURE — 2700000000 HC OXYGEN THERAPY PER DAY

## 2020-08-08 PROCEDURE — 94761 N-INVAS EAR/PLS OXIMETRY MLT: CPT

## 2020-08-08 RX ADMIN — OXYCODONE 10 MG: 5 TABLET ORAL at 23:08

## 2020-08-08 RX ADMIN — FOLIC ACID 1 MG: 1 TABLET ORAL at 07:47

## 2020-08-08 RX ADMIN — METHADONE HYDROCHLORIDE 10 MG: 10 TABLET ORAL at 19:36

## 2020-08-08 RX ADMIN — HYDROMORPHONE HYDROCHLORIDE 2 MG: 2 INJECTION, SOLUTION INTRAMUSCULAR; INTRAVENOUS; SUBCUTANEOUS at 14:33

## 2020-08-08 RX ADMIN — VANCOMYCIN HYDROCHLORIDE 1000 MG: 1 INJECTION, POWDER, LYOPHILIZED, FOR SOLUTION INTRAVENOUS at 01:00

## 2020-08-08 RX ADMIN — SODIUM CHLORIDE: 9 INJECTION, SOLUTION INTRAVENOUS at 18:58

## 2020-08-08 RX ADMIN — VANCOMYCIN HYDROCHLORIDE 1250 MG: 10 INJECTION, POWDER, LYOPHILIZED, FOR SOLUTION INTRAVENOUS at 18:59

## 2020-08-08 RX ADMIN — CEFEPIME HYDROCHLORIDE 2 G: 2 INJECTION, POWDER, FOR SOLUTION INTRAVENOUS at 20:15

## 2020-08-08 RX ADMIN — ACETAMINOPHEN 650 MG: 325 TABLET ORAL at 06:11

## 2020-08-08 RX ADMIN — METHADONE HYDROCHLORIDE 10 MG: 10 TABLET ORAL at 09:17

## 2020-08-08 RX ADMIN — HYDROMORPHONE HYDROCHLORIDE 2 MG: 2 INJECTION, SOLUTION INTRAMUSCULAR; INTRAVENOUS; SUBCUTANEOUS at 07:48

## 2020-08-08 RX ADMIN — OXYCODONE 10 MG: 5 TABLET ORAL at 12:08

## 2020-08-08 RX ADMIN — OXYCODONE 10 MG: 5 TABLET ORAL at 06:08

## 2020-08-08 RX ADMIN — HYDROMORPHONE HYDROCHLORIDE 2 MG: 2 INJECTION, SOLUTION INTRAMUSCULAR; INTRAVENOUS; SUBCUTANEOUS at 01:13

## 2020-08-08 RX ADMIN — CEFEPIME HYDROCHLORIDE 2 G: 2 INJECTION, POWDER, FOR SOLUTION INTRAVENOUS at 07:47

## 2020-08-08 RX ADMIN — OXYCODONE 10 MG: 5 TABLET ORAL at 18:58

## 2020-08-08 RX ADMIN — METHADONE HYDROCHLORIDE 10 MG: 10 TABLET ORAL at 15:53

## 2020-08-08 RX ADMIN — VANCOMYCIN HYDROCHLORIDE 1250 MG: 10 INJECTION, POWDER, LYOPHILIZED, FOR SOLUTION INTRAVENOUS at 09:17

## 2020-08-08 RX ADMIN — HYDROMORPHONE HYDROCHLORIDE 2 MG: 2 INJECTION, SOLUTION INTRAMUSCULAR; INTRAVENOUS; SUBCUTANEOUS at 19:36

## 2020-08-08 ASSESSMENT — PAIN DESCRIPTION - ORIENTATION
ORIENTATION: RIGHT;LEFT

## 2020-08-08 ASSESSMENT — PAIN SCALES - GENERAL
PAINLEVEL_OUTOF10: 8
PAINLEVEL_OUTOF10: 8
PAINLEVEL_OUTOF10: 6
PAINLEVEL_OUTOF10: 8
PAINLEVEL_OUTOF10: 8
PAINLEVEL_OUTOF10: 7
PAINLEVEL_OUTOF10: 9
PAINLEVEL_OUTOF10: 8
PAINLEVEL_OUTOF10: 7

## 2020-08-08 ASSESSMENT — PAIN DESCRIPTION - LOCATION
LOCATION: BACK;LEG
LOCATION: KNEE;RIB CAGE
LOCATION: BACK;LEG

## 2020-08-08 ASSESSMENT — PAIN DESCRIPTION - PAIN TYPE
TYPE: ACUTE PAIN
TYPE: ACUTE PAIN

## 2020-08-08 NOTE — PROGRESS NOTES
This RN received a call from lab saying the patient refused his lab draw because he wants more pain meds. This RN went to patients room and told patient he just had methadone about an hour ago. This RN told the patient he needs his lab draw done and asked why he did not allow them to draw blood. Patient stated he wanted the lab lady to call me and ask for more pain medicine. This RN told the patient he would not receive pain medicine until his labs are drawn. Will continue to monitor.

## 2020-08-08 NOTE — PROGRESS NOTES
without murmurs, rubs or gallops. Abdomen: Soft, non-tender, non-distended with normal bowel sounds. Musculoskeletal: No clubbing, cyanosis or edema bilaterally. Skin: Warm and dry  Neurologic: awake , speech clear with no overt facial droop  Psychiatric: Awake    Labs:   Recent Labs     08/06/20 1930 08/07/20  0832 08/07/20  2311 08/08/20  0734   WBC 20.7* 17.3*  --  16.4*   HGB 7.8* 6.8* 7.5* 7.3*   HCT 22.2* 19.3* 21.4* 21.1*    240  --  221     Recent Labs     08/06/20 1930 08/07/20  0630 08/08/20  0734   * 133* 139   K 4.1 5.0 4.3   CL 97* 103 106   CO2 22 18* 23   BUN 15 11 9   CREATININE 0.8* 0.6* 0.6*   CALCIUM 9.5 9.1 9.2     Recent Labs     08/06/20 1930   AST 35   ALT 14   BILITOT 7.4*   ALKPHOS 51     Recent Labs     08/06/20 1930   INR 1.44*     No results for input(s): Son Rand in the last 72 hours. Urinalysis:      Lab Results   Component Value Date    NITRU Negative 08/06/2020    WBCUA None seen 08/06/2020    RBCUA 0-2 08/06/2020    BLOODU TRACE-LYSED 08/06/2020    SPECGRAV <=1.005 08/06/2020    GLUCOSEU Negative 08/06/2020       Radiology:  CT CHEST PULMONARY EMBOLISM W CONTRAST   Final Result   No evidence of pulmonary embolism or acute pulmonary abnormality. XR CHEST PORTABLE   Final Result   No convincing acute cardiopulmonary abnormality. Assessment/Plan:    Active Hospital Problems    Diagnosis    Sepsis ([facility] [A41.9]    Sickle cell crisis ([facility] [D57.00]       Sepsis: met SIRS criteria with no overt source of infection identified. Lactic acid was normal.  Recently hospitalized for pneumonia. Wbc and fever curve improving. Continue empiric abx while awaiting cultures, COVID 19 test pending. Sickle cell crisis with anemia : hemonc assisting. S/p PRBC on 8/7. Monitor hgb and transfuse if <7. Continue pain  meds as ordered        Hyponatremia : likely due to vol depletion. Improving with IVF.  Resolved                       DVT Prophylaxis: lovenox  Diet: DIET GENERAL;  Code Status: Full Code    PT/OT Eval Status: not indicated at this time         Dispo -  2-4 days pending clinical ourse     Jon Skinner MD

## 2020-08-08 NOTE — PLAN OF CARE
Pt scoring pain on 8-10 scale. Pain medications given per MAR. Pt instructed to call out when pain level increasing. Call light within reach. Nurse will continue to reassess and monitor.

## 2020-08-08 NOTE — PROGRESS NOTES
ONCOLOGY FOLLOW-UP:       Primary Oncologist:  Dr. Sohan Guevara:       Patient Active Problem List   Diagnosis Code    Sickle cell pain crisis ([facility] D57.00    Asthma J45.909    Sickle cell crisis ([facility] D57.00    Sepsis ([facility] A41.9       INTERVAL HISTORY:       Remains admitted. Still having low grade fever this AM, overall fever has improved. Remains in COVID isolation. Was refusing lab draws yesterday. Transfused yesterday. REVIEW OF SYSTEMS:       Deferred due to rule out COVID isolation    PHYSICAL EXAM:       BP (!) 119/57   Pulse 103   Temp 100.2 °F (37.9 °C) (Oral)   Resp 18   Ht 5' 8\" (1.727 m)   Wt 160 lb 15 oz (73 kg)   SpO2 93%   BMI 24.47 kg/m²     Deferred due to COVID isolation. LABS:     CBC:   Lab Results   Component Value Date    WBC 17.3 (H) 08/07/2020    HGB 7.5 (L) 08/07/2020    HCT 21.4 (L) 08/07/2020    .4 (H) 08/07/2020     08/07/2020    LYMPHOPCT 14.0 08/07/2020    RBC 1.75 (L) 08/07/2020    MCH 39.0 (H) 08/07/2020    MCHC 35.4 08/07/2020    RDW 24.7 (H) 08/07/2020       BMP:   Lab Results   Component Value Date     08/08/2020    K 4.3 08/08/2020     08/08/2020    CO2 23 08/08/2020    BUN 9 08/08/2020    CREATININE 0.6 08/08/2020    GLUCOSE 109 08/08/2020    CALCIUM 9.2 08/08/2020        Hepatic Function Panel:   Lab Results   Component Value Date    ALKPHOS 51 08/06/2020    ALT 14 08/06/2020    AST 35 08/06/2020    PROT 7.2 08/06/2020    BILITOT 7.4 08/06/2020    BILIDIR 0.3 07/25/2020    LABALBU 4.5 08/06/2020        IMAGING:     Xr Chest Portable  Result Date: 8/6/2020  No convincing acute cardiopulmonary abnormality. Xr Chest Portable  Result Date: 7/25/2020  Subtle right infrahilar airspace opacity within the medial right lung base. Ct Chest Pulmonary Embolism W Contrast  Result Date: 8/6/2020  No evidence of pulmonary embolism or acute pulmonary abnormality.        ASSESSMENT AND PLAN:       Sickle Cell Crisis Pain  - No indication for red cell exchange and no end organ damage on lab review  - Transfused 1 unit PRBC 8/7/2020, Hgb S negative.    - Cont IV fluids  - CTPA negative for infarction   - Blood cx pending for fever  - Cont Cefepime and Vanco for empiric coverage  - Await COVID status   - Cont Hydrea home dose and folic acid      Pain   - Methadone changed to 10 TID per home dose, managed by a pain specialist outpatient    - Oxy IR 10 mg PO every 4 hours PRN   - Dilaudid 2 mg IVP every 2 hours PRN    - If knee pain persists over weekend, consider Femi Alvarez MD

## 2020-08-09 ENCOUNTER — APPOINTMENT (OUTPATIENT)
Dept: GENERAL RADIOLOGY | Age: 20
DRG: 812 | End: 2020-08-09
Payer: COMMERCIAL

## 2020-08-09 LAB
ANION GAP SERPL CALCULATED.3IONS-SCNC: 9 MMOL/L (ref 3–16)
ANISOCYTOSIS: ABNORMAL
ATYPICAL LYMPHOCYTE RELATIVE PERCENT: 2 % (ref 0–6)
BASOPHILS ABSOLUTE: 0 K/UL (ref 0–0.2)
BASOPHILS RELATIVE PERCENT: 0 %
BLOOD BANK DISPENSE STATUS: NORMAL
BLOOD BANK DISPENSE STATUS: NORMAL
BLOOD BANK PRODUCT CODE: NORMAL
BLOOD BANK PRODUCT CODE: NORMAL
BPU ID: NORMAL
BPU ID: NORMAL
BUN BLDV-MCNC: 6 MG/DL (ref 7–20)
CALCIUM SERPL-MCNC: 10.1 MG/DL (ref 8.3–10.6)
CHLORIDE BLD-SCNC: 100 MMOL/L (ref 99–110)
CO2: 27 MMOL/L (ref 21–32)
CREAT SERPL-MCNC: 0.6 MG/DL (ref 0.9–1.3)
DESCRIPTION BLOOD BANK: NORMAL
DESCRIPTION BLOOD BANK: NORMAL
EOSINOPHILS ABSOLUTE: 0 K/UL (ref 0–0.6)
EOSINOPHILS RELATIVE PERCENT: 0 %
GFR AFRICAN AMERICAN: >60
GFR NON-AFRICAN AMERICAN: >60
GLUCOSE BLD-MCNC: 128 MG/DL (ref 70–99)
HCT VFR BLD CALC: 19.4 % (ref 40.5–52.5)
HCT VFR BLD CALC: 23.1 % (ref 40.5–52.5)
HEMATOLOGY PATH CONSULT: NO
HEMOGLOBIN: 6.7 G/DL (ref 13.5–17.5)
HEMOGLOBIN: 8 G/DL (ref 13.5–17.5)
LYMPHOCYTES ABSOLUTE: 2.8 K/UL (ref 1–5.1)
LYMPHOCYTES RELATIVE PERCENT: 16 %
MACROCYTES: ABNORMAL
MCH RBC QN AUTO: 38.7 PG (ref 26–34)
MCHC RBC AUTO-ENTMCNC: 34.5 G/DL (ref 31–36)
MCV RBC AUTO: 112.2 FL (ref 80–100)
MICROCYTES: ABNORMAL
MONOCYTES ABSOLUTE: 0.9 K/UL (ref 0–1.3)
MONOCYTES RELATIVE PERCENT: 6 %
NEUTROPHILS ABSOLUTE: 11.8 K/UL (ref 1.7–7.7)
NEUTROPHILS RELATIVE PERCENT: 76 %
OVALOCYTES: ABNORMAL
PDW BLD-RTO: 24.4 % (ref 12.4–15.4)
PLATELET # BLD: 194 K/UL (ref 135–450)
PLATELET SLIDE REVIEW: ADEQUATE
PMV BLD AUTO: 9 FL (ref 5–10.5)
POIKILOCYTES: ABNORMAL
POLYCHROMASIA: ABNORMAL
POTASSIUM REFLEX MAGNESIUM: 3.7 MMOL/L (ref 3.5–5.1)
RBC # BLD: 1.73 M/UL (ref 4.2–5.9)
SCHISTOCYTES: ABNORMAL
SICKLE CELLS: ABNORMAL
SLIDE REVIEW: ABNORMAL
SODIUM BLD-SCNC: 136 MMOL/L (ref 136–145)
TARGET CELLS: ABNORMAL
TEAR DROP CELLS: ABNORMAL
VANCOMYCIN TROUGH: 10.5 UG/ML (ref 10–20)
WBC # BLD: 15.5 K/UL (ref 4–11)

## 2020-08-09 PROCEDURE — 80048 BASIC METABOLIC PNL TOTAL CA: CPT

## 2020-08-09 PROCEDURE — 73560 X-RAY EXAM OF KNEE 1 OR 2: CPT

## 2020-08-09 PROCEDURE — 6370000000 HC RX 637 (ALT 250 FOR IP): Performed by: NURSE PRACTITIONER

## 2020-08-09 PROCEDURE — 80202 ASSAY OF VANCOMYCIN: CPT

## 2020-08-09 PROCEDURE — 6360000002 HC RX W HCPCS: Performed by: NURSE PRACTITIONER

## 2020-08-09 PROCEDURE — 6360000002 HC RX W HCPCS: Performed by: INTERNAL MEDICINE

## 2020-08-09 PROCEDURE — 2580000003 HC RX 258: Performed by: INTERNAL MEDICINE

## 2020-08-09 PROCEDURE — 6370000000 HC RX 637 (ALT 250 FOR IP): Performed by: INTERNAL MEDICINE

## 2020-08-09 PROCEDURE — 1200000000 HC SEMI PRIVATE

## 2020-08-09 PROCEDURE — 2580000003 HC RX 258: Performed by: NURSE PRACTITIONER

## 2020-08-09 PROCEDURE — 99223 1ST HOSP IP/OBS HIGH 75: CPT | Performed by: INTERNAL MEDICINE

## 2020-08-09 PROCEDURE — 85025 COMPLETE CBC W/AUTO DIFF WBC: CPT

## 2020-08-09 PROCEDURE — 85014 HEMATOCRIT: CPT

## 2020-08-09 PROCEDURE — 85018 HEMOGLOBIN: CPT

## 2020-08-09 RX ORDER — IBUPROFEN 200 MG
CAPSULE ORAL 2 TIMES DAILY
Status: DISCONTINUED | OUTPATIENT
Start: 2020-08-09 | End: 2020-08-26 | Stop reason: HOSPADM

## 2020-08-09 RX ORDER — HYDROXYUREA 500 MG/1
500 CAPSULE ORAL DAILY
Status: DISCONTINUED | OUTPATIENT
Start: 2020-08-09 | End: 2020-08-15

## 2020-08-09 RX ORDER — 0.9 % SODIUM CHLORIDE 0.9 %
250 INTRAVENOUS SOLUTION INTRAVENOUS ONCE
Status: COMPLETED | OUTPATIENT
Start: 2020-08-09 | End: 2020-08-09

## 2020-08-09 RX ORDER — FOLIC ACID 1 MG/1
1 TABLET ORAL DAILY
Status: DISCONTINUED | OUTPATIENT
Start: 2020-08-09 | End: 2020-08-09 | Stop reason: SDUPTHER

## 2020-08-09 RX ADMIN — HYDROMORPHONE HYDROCHLORIDE 2 MG: 2 INJECTION, SOLUTION INTRAMUSCULAR; INTRAVENOUS; SUBCUTANEOUS at 00:14

## 2020-08-09 RX ADMIN — HYDROMORPHONE HYDROCHLORIDE 2 MG: 2 INJECTION, SOLUTION INTRAMUSCULAR; INTRAVENOUS; SUBCUTANEOUS at 04:41

## 2020-08-09 RX ADMIN — HYDROXYUREA 500 MG: 500 CAPSULE ORAL at 12:37

## 2020-08-09 RX ADMIN — METHADONE HYDROCHLORIDE 10 MG: 10 TABLET ORAL at 13:46

## 2020-08-09 RX ADMIN — OXYCODONE 10 MG: 5 TABLET ORAL at 03:07

## 2020-08-09 RX ADMIN — OXYCODONE 10 MG: 5 TABLET ORAL at 17:02

## 2020-08-09 RX ADMIN — Medication 10 ML: at 08:28

## 2020-08-09 RX ADMIN — HYDROMORPHONE HYDROCHLORIDE 2 MG: 2 INJECTION, SOLUTION INTRAMUSCULAR; INTRAVENOUS; SUBCUTANEOUS at 21:02

## 2020-08-09 RX ADMIN — OXYCODONE 10 MG: 5 TABLET ORAL at 22:54

## 2020-08-09 RX ADMIN — FOLIC ACID 1 MG: 1 TABLET ORAL at 08:28

## 2020-08-09 RX ADMIN — VANCOMYCIN HYDROCHLORIDE 1250 MG: 10 INJECTION, POWDER, LYOPHILIZED, FOR SOLUTION INTRAVENOUS at 09:55

## 2020-08-09 RX ADMIN — OXYCODONE 10 MG: 5 TABLET ORAL at 12:28

## 2020-08-09 RX ADMIN — VANCOMYCIN HYDROCHLORIDE 1250 MG: 10 INJECTION, POWDER, LYOPHILIZED, FOR SOLUTION INTRAVENOUS at 01:26

## 2020-08-09 RX ADMIN — CEFEPIME HYDROCHLORIDE 2 G: 2 INJECTION, POWDER, FOR SOLUTION INTRAVENOUS at 08:30

## 2020-08-09 RX ADMIN — OXYCODONE 10 MG: 5 TABLET ORAL at 08:27

## 2020-08-09 RX ADMIN — Medication 10 ML: at 21:02

## 2020-08-09 RX ADMIN — HYDROMORPHONE HYDROCHLORIDE 2 MG: 2 INJECTION, SOLUTION INTRAMUSCULAR; INTRAVENOUS; SUBCUTANEOUS at 18:49

## 2020-08-09 RX ADMIN — HYDROMORPHONE HYDROCHLORIDE 2 MG: 2 INJECTION, SOLUTION INTRAMUSCULAR; INTRAVENOUS; SUBCUTANEOUS at 13:46

## 2020-08-09 RX ADMIN — BACITRACIN ZINC, NEOMYCIN SULFATE, AND POLYMYXIN B SULFATE: 400; 3.5; 5 OINTMENT TOPICAL at 21:03

## 2020-08-09 RX ADMIN — VANCOMYCIN HYDROCHLORIDE 1250 MG: 10 INJECTION, POWDER, LYOPHILIZED, FOR SOLUTION INTRAVENOUS at 17:02

## 2020-08-09 RX ADMIN — HYDROMORPHONE HYDROCHLORIDE 2 MG: 2 INJECTION, SOLUTION INTRAMUSCULAR; INTRAVENOUS; SUBCUTANEOUS at 09:54

## 2020-08-09 RX ADMIN — METHADONE HYDROCHLORIDE 10 MG: 10 TABLET ORAL at 08:30

## 2020-08-09 RX ADMIN — METHADONE HYDROCHLORIDE 10 MG: 10 TABLET ORAL at 21:01

## 2020-08-09 RX ADMIN — CEFEPIME HYDROCHLORIDE 2 G: 2 INJECTION, POWDER, FOR SOLUTION INTRAVENOUS at 21:01

## 2020-08-09 RX ADMIN — SODIUM CHLORIDE 250 ML: 9 INJECTION, SOLUTION INTRAVENOUS at 12:29

## 2020-08-09 ASSESSMENT — PAIN DESCRIPTION - LOCATION
LOCATION: LEG
LOCATION: BACK;LEG

## 2020-08-09 ASSESSMENT — PAIN SCALES - GENERAL
PAINLEVEL_OUTOF10: 0
PAINLEVEL_OUTOF10: 8
PAINLEVEL_OUTOF10: 4
PAINLEVEL_OUTOF10: 8
PAINLEVEL_OUTOF10: 7
PAINLEVEL_OUTOF10: 8
PAINLEVEL_OUTOF10: 8

## 2020-08-09 ASSESSMENT — PAIN DESCRIPTION - PAIN TYPE
TYPE: ACUTE PAIN

## 2020-08-09 ASSESSMENT — PAIN DESCRIPTION - DESCRIPTORS
DESCRIPTORS: ACHING;SORE;CONSTANT
DESCRIPTORS: ACHING

## 2020-08-09 ASSESSMENT — PAIN DESCRIPTION - ORIENTATION
ORIENTATION: RIGHT;LEFT

## 2020-08-09 ASSESSMENT — PAIN DESCRIPTION - ONSET: ONSET: ON-GOING

## 2020-08-09 ASSESSMENT — PAIN - FUNCTIONAL ASSESSMENT: PAIN_FUNCTIONAL_ASSESSMENT: ACTIVITIES ARE NOT PREVENTED

## 2020-08-09 ASSESSMENT — PAIN DESCRIPTION - FREQUENCY
FREQUENCY: CONTINUOUS
FREQUENCY: CONTINUOUS

## 2020-08-09 ASSESSMENT — PAIN DESCRIPTION - PROGRESSION: CLINICAL_PROGRESSION: NOT CHANGED

## 2020-08-09 NOTE — PROGRESS NOTES
ONCOLOGY FOLLOW-UP:       Primary Oncologist:  Dr. Paty Hahn:       Patient Active Problem List   Diagnosis Code    Sickle cell pain crisis ([facility] D57.00    Asthma J45.909    Sickle cell crisis ([facility] D57.00    Sepsis ([facility] A41.9       INTERVAL HISTORY:       Still having low grade fevers. Still reports bilateral knee pain. This is not his usual sickle cell pain. Hgb 6.7. REVIEW OF SYSTEMS:       10 point ROS completed. Pertinent positives in HPI, otherwise negative. PHYSICAL EXAM:       /68   Pulse 102   Temp 99.6 °F (37.6 °C) (Oral)   Resp 16   Ht 5' 8\" (1.727 m)   Wt 175 lb (79.4 kg)   SpO2 100%   BMI 26.61 kg/m²     General appearance: No apparent respiratory distress, appears stated age and cooperative. HEENT: Pupils equal, round, Conjunctivae/corneas clear. Neck: Supple, with full range of motion. No jugular venous distention. Trachea midline. Respiratory:  Normal respiratory effort. Clear to auscultation, bilaterally without Rales/Wheezes/Rhonchi. Cardiovascular: Regular rate and rhythm with normal S1/S2 without murmurs, rubs or gallops. Abdomen: Soft, non-tender, non-distended with normal bowel sounds. Musculoskeletal: No clubbing, cyanosis or edema bilaterally.      LABS:     CBC:   Lab Results   Component Value Date    WBC 15.5 (H) 08/09/2020    HGB 6.7 (LL) 08/09/2020    HCT 19.4 (LL) 08/09/2020    .2 (H) 08/09/2020     08/09/2020    LYMPHOPCT 16.0 08/09/2020    RBC 1.73 (L) 08/09/2020    MCH 38.7 (H) 08/09/2020    MCHC 34.5 08/09/2020    RDW 24.4 (H) 08/09/2020       BMP:   Lab Results   Component Value Date     08/09/2020    K 3.7 08/09/2020     08/09/2020    CO2 27 08/09/2020    BUN 6 08/09/2020    CREATININE 0.6 08/09/2020    GLUCOSE 128 08/09/2020    CALCIUM 10.1 08/09/2020        Hepatic Function Panel:   Lab Results   Component Value Date    ALKPHOS 51 08/06/2020    ALT 14 08/06/2020    AST 35 08/06/2020    PROT 7.2 08/06/2020 BILITOT 7.4 08/06/2020    BILIDIR 0.3 07/25/2020    LABALBU 4.5 08/06/2020        IMAGING:     Xr Chest Portable  Result Date: 8/6/2020  No convincing acute cardiopulmonary abnormality. Xr Chest Portable  Result Date: 7/25/2020  Subtle right infrahilar airspace opacity within the medial right lung base. Ct Chest Pulmonary Embolism W Contrast  Result Date: 8/6/2020  No evidence of pulmonary embolism or acute pulmonary abnormality. ASSESSMENT AND PLAN:       Sickle Cell Crisis Pain  - No indication for red cell exchange and no end organ damage on lab review  - Transfused 1 unit PRBC 8/7/2020, Hgb S negative.    - Cont IV fluids  - CTPA negative for infarction   - Blood cx ngtd  - Cont Cefepime and Vanco for empiric coverage  - Await COVID status   - Cont Hydrea home dose and folic acid - these were not on MAR - reordered  - give 1 unit PRBC with Hgb 6.7     Pain   - Methadone changed to 10 TID per home dose, managed by a pain specialist outpatient    - Oxy IR 10 mg PO every 4 hours PRN   - Dilaudid 2 mg IVP every 2 hours PRN    - would recommend knee X-ray once out of COVID isolation         Sohail Valiente MD

## 2020-08-09 NOTE — PROGRESS NOTES
Hospitalist Progress Note      PCP: Andre Linda MD    Date of Admission: 8/6/2020    Chief Complaint:    Sickle cell pain      Hospital Course: H&P reviewed. Patient with history of known sickle cell anemia who presented with generalized body pain involving his knees, upper legs, and ribs which he felt was due to exacerbation of his sickle cell anemia. Noted to be febrile in the ER no overt source of infection identified. He was admitted for further care. Hematology consulted    Subjective:     Temp 100.8F last night. Report pain in ribs improved but still having significant pain in bilat knees, L>R. Denied any recent trauma. Getting freq IV pain meds. Discussed with pt's parents on the phone at his request.        Medications:  Reviewed    Infusion Medications    sodium chloride 100 mL/hr at 08/08/20 1858     Scheduled Medications    hydroxyurea  500 mg Oral Daily    vancomycin  1,250 mg Intravenous Q8H    sodium chloride  20 mL Intravenous Once    methadone  10 mg Oral TID    folic acid  1 mg Oral Daily    sodium chloride flush  10 mL Intravenous 2 times per day    enoxaparin  40 mg Subcutaneous Daily    cefepime  2 g Intravenous Q12H     PRN Meds: oxyCODONE, [DISCONTINUED] HYDROmorphone **OR** HYDROmorphone, sodium chloride flush, acetaminophen **OR** acetaminophen      Intake/Output Summary (Last 24 hours) at 8/9/2020 1247  Last data filed at 8/9/2020 1238  Gross per 24 hour   Intake 1260 ml   Output 2050 ml   Net -790 ml       Physical Exam Performed:    /62   Pulse 103   Temp 98.6 °F (37 °C) (Oral)   Resp 16   Ht 5' 8\" (1.727 m)   Wt 175 lb (79.4 kg)   SpO2 100%   BMI 26.61 kg/m²     General appearance: No apparent respiratory distress, appears stated age and cooperative. HEENT: Pupils equal, round, Conjunctivae/corneas clear. Neck: Supple, with full range of motion. No jugular venous distention. Trachea midline. Respiratory:  Normal respiratory effort.  Clear to auscultation, bilaterally without Rales/Wheezes/Rhonchi. Cardiovascular: Regular rate and rhythm with normal S1/S2 without murmurs, rubs or gallops. Abdomen: Soft, non-tender, non-distended with normal bowel sounds. Musculoskeletal: No clubbing, cyanosis or edema bilaterally. Knees swollen and tender with no overt redness or differential warmth   Skin: Warm and dry  Neurologic: awake , speech clear with no overt facial droop  Psychiatric: Awake    Labs:   Recent Labs     08/07/20  0832 08/07/20  2311 08/08/20  0734 08/09/20  0845   WBC 17.3*  --  16.4* 15.5*   HGB 6.8* 7.5* 7.3* 6.7*   HCT 19.3* 21.4* 21.1* 19.4*     --  221 194     Recent Labs     08/07/20  0630 08/08/20  0734 08/09/20  0845   * 139 136   K 5.0 4.3 3.7    106 100   CO2 18* 23 27   BUN 11 9 6*   CREATININE 0.6* 0.6* 0.6*   CALCIUM 9.1 9.2 10.1     Recent Labs     08/06/20  1930   AST 35   ALT 14   BILITOT 7.4*   ALKPHOS 51     Recent Labs     08/06/20  1930   INR 1.44*     No results for input(s): Lilliam Formosa in the last 72 hours. Urinalysis:      Lab Results   Component Value Date    NITRU Negative 08/06/2020    WBCUA None seen 08/06/2020    RBCUA 0-2 08/06/2020    BLOODU TRACE-LYSED 08/06/2020    SPECGRAV <=1.005 08/06/2020    GLUCOSEU Negative 08/06/2020       Radiology:  CT CHEST PULMONARY EMBOLISM W CONTRAST   Final Result   No evidence of pulmonary embolism or acute pulmonary abnormality. XR CHEST PORTABLE   Final Result   No convincing acute cardiopulmonary abnormality. Assessment/Plan:    Active Hospital Problems    Diagnosis    Sepsis ([facility] [A41.9]    Sickle cell crisis ([facility] [D57.00]       Sepsis: met SIRS criteria with no overt source of infection identified. Lactic acid was normal.  Recently hospitalized for pneumonia. Fever recurrent despite abx. Will consult ID. Xray of bilat knees. Continue empiric abx while awaiting cultures, COVID 19 test pending.      Sickle cell crisis with anemia : hemonc assisting. S/p PRBC on 8/7. hgb 6.7 today - 1PRBC ordered. Monitor hgb and transfuse if <7. Continue hydroxyurea, folic acid, pain  meds as ordered        Hyponatremia : likely due to vol depletion. Improving with IVF.  Resolved                       DVT Prophylaxis: lovenox  Diet: DIET GENERAL;  Code Status: Full Code    PT/OT Eval Status: not indicated at this time         Dispo -  Hard to say, pending clinical course     Orlando Chaves MD

## 2020-08-09 NOTE — PLAN OF CARE
Problem: Pain:  Goal: Pain level will decrease  Description: Pain level will decrease  8/9/2020 0046 by Caroline Contreras RN  Note: Resting quietly with eyes closed, no signs of discomfort noted. Call light remains in reach.    8/8/2020 1332 by Kiel Reyes RN  Outcome: Ongoing

## 2020-08-10 LAB
ANION GAP SERPL CALCULATED.3IONS-SCNC: 10 MMOL/L (ref 3–16)
BASOPHILS ABSOLUTE: 0.1 K/UL (ref 0–0.2)
BASOPHILS RELATIVE PERCENT: 0.9 %
BLOOD CULTURE, ROUTINE: NORMAL
BUN BLDV-MCNC: 6 MG/DL (ref 7–20)
CALCIUM SERPL-MCNC: 9.3 MG/DL (ref 8.3–10.6)
CHLORIDE BLD-SCNC: 99 MMOL/L (ref 99–110)
CO2: 26 MMOL/L (ref 21–32)
CREAT SERPL-MCNC: 0.7 MG/DL (ref 0.9–1.3)
CULTURE, BLOOD 2: NORMAL
EOSINOPHILS ABSOLUTE: 0.2 K/UL (ref 0–0.6)
EOSINOPHILS RELATIVE PERCENT: 2 %
GFR AFRICAN AMERICAN: >60
GFR NON-AFRICAN AMERICAN: >60
GLUCOSE BLD-MCNC: 105 MG/DL (ref 70–99)
HCT VFR BLD CALC: 22.5 % (ref 40.5–52.5)
HEMATOLOGY PATH CONSULT: NO
HEMOGLOBIN: 7.6 G/DL (ref 13.5–17.5)
LYMPHOCYTES ABSOLUTE: 2.9 K/UL (ref 1–5.1)
LYMPHOCYTES RELATIVE PERCENT: 28.4 %
MCH RBC QN AUTO: 36.8 PG (ref 26–34)
MCHC RBC AUTO-ENTMCNC: 33.7 G/DL (ref 31–36)
MCV RBC AUTO: 109 FL (ref 80–100)
MONOCYTES ABSOLUTE: 1.3 K/UL (ref 0–1.3)
MONOCYTES RELATIVE PERCENT: 12.3 %
NEUTROPHILS ABSOLUTE: 5.8 K/UL (ref 1.7–7.7)
NEUTROPHILS RELATIVE PERCENT: 56.4 %
PDW BLD-RTO: 28.4 % (ref 12.4–15.4)
PLATELET # BLD: 90 K/UL (ref 135–450)
PLATELET SLIDE REVIEW: ABNORMAL
PMV BLD AUTO: 10.1 FL (ref 5–10.5)
POTASSIUM REFLEX MAGNESIUM: 3.6 MMOL/L (ref 3.5–5.1)
RBC # BLD: 2.06 M/UL (ref 4.2–5.9)
REASON FOR REJECTION: NORMAL
REJECTED TEST: NORMAL
SLIDE REVIEW: ABNORMAL
SODIUM BLD-SCNC: 135 MMOL/L (ref 136–145)
WBC # BLD: 10.3 K/UL (ref 4–11)

## 2020-08-10 PROCEDURE — 6370000000 HC RX 637 (ALT 250 FOR IP): Performed by: NURSE PRACTITIONER

## 2020-08-10 PROCEDURE — 36415 COLL VENOUS BLD VENIPUNCTURE: CPT

## 2020-08-10 PROCEDURE — 80048 BASIC METABOLIC PNL TOTAL CA: CPT

## 2020-08-10 PROCEDURE — 6360000002 HC RX W HCPCS: Performed by: NURSE PRACTITIONER

## 2020-08-10 PROCEDURE — 6360000002 HC RX W HCPCS: Performed by: INTERNAL MEDICINE

## 2020-08-10 PROCEDURE — 1200000000 HC SEMI PRIVATE

## 2020-08-10 PROCEDURE — 6370000000 HC RX 637 (ALT 250 FOR IP): Performed by: INTERNAL MEDICINE

## 2020-08-10 PROCEDURE — 2580000003 HC RX 258: Performed by: NURSE PRACTITIONER

## 2020-08-10 PROCEDURE — 85025 COMPLETE CBC W/AUTO DIFF WBC: CPT

## 2020-08-10 RX ORDER — OXYCODONE HYDROCHLORIDE 15 MG/1
15 TABLET ORAL EVERY 4 HOURS PRN
Status: DISCONTINUED | OUTPATIENT
Start: 2020-08-10 | End: 2020-08-13

## 2020-08-10 RX ADMIN — OXYCODONE HYDROCHLORIDE 15 MG: 15 TABLET ORAL at 14:35

## 2020-08-10 RX ADMIN — HYDROMORPHONE HYDROCHLORIDE 2 MG: 2 INJECTION, SOLUTION INTRAMUSCULAR; INTRAVENOUS; SUBCUTANEOUS at 00:40

## 2020-08-10 RX ADMIN — FOLIC ACID 1 MG: 1 TABLET ORAL at 08:19

## 2020-08-10 RX ADMIN — OXYCODONE HYDROCHLORIDE 15 MG: 15 TABLET ORAL at 20:03

## 2020-08-10 RX ADMIN — OXYCODONE 10 MG: 5 TABLET ORAL at 03:47

## 2020-08-10 RX ADMIN — METHADONE HYDROCHLORIDE 10 MG: 10 TABLET ORAL at 14:35

## 2020-08-10 RX ADMIN — HYDROMORPHONE HYDROCHLORIDE 2 MG: 2 INJECTION, SOLUTION INTRAMUSCULAR; INTRAVENOUS; SUBCUTANEOUS at 09:39

## 2020-08-10 RX ADMIN — CEFEPIME HYDROCHLORIDE 2 G: 2 INJECTION, POWDER, FOR SOLUTION INTRAVENOUS at 20:03

## 2020-08-10 RX ADMIN — Medication 10 ML: at 00:40

## 2020-08-10 RX ADMIN — HYDROXYUREA 500 MG: 500 CAPSULE ORAL at 08:19

## 2020-08-10 RX ADMIN — BACITRACIN ZINC, NEOMYCIN SULFATE, AND POLYMYXIN B SULFATE: 400; 3.5; 5 OINTMENT TOPICAL at 08:19

## 2020-08-10 RX ADMIN — OXYCODONE 10 MG: 5 TABLET ORAL at 12:27

## 2020-08-10 RX ADMIN — HYDROMORPHONE HYDROCHLORIDE 2 MG: 2 INJECTION, SOLUTION INTRAMUSCULAR; INTRAVENOUS; SUBCUTANEOUS at 15:51

## 2020-08-10 RX ADMIN — CEFEPIME HYDROCHLORIDE 2 G: 2 INJECTION, POWDER, FOR SOLUTION INTRAVENOUS at 08:19

## 2020-08-10 RX ADMIN — OXYCODONE 10 MG: 5 TABLET ORAL at 08:19

## 2020-08-10 RX ADMIN — BACITRACIN ZINC, NEOMYCIN SULFATE, AND POLYMYXIN B SULFATE 1 G: 400; 3.5; 5 OINTMENT TOPICAL at 21:04

## 2020-08-10 RX ADMIN — HYDROMORPHONE HYDROCHLORIDE 2 MG: 2 INJECTION, SOLUTION INTRAMUSCULAR; INTRAVENOUS; SUBCUTANEOUS at 22:09

## 2020-08-10 RX ADMIN — HYDROMORPHONE HYDROCHLORIDE 2 MG: 2 INJECTION, SOLUTION INTRAMUSCULAR; INTRAVENOUS; SUBCUTANEOUS at 17:58

## 2020-08-10 RX ADMIN — SODIUM CHLORIDE: 9 INJECTION, SOLUTION INTRAVENOUS at 03:58

## 2020-08-10 RX ADMIN — METHADONE HYDROCHLORIDE 10 MG: 10 TABLET ORAL at 21:04

## 2020-08-10 RX ADMIN — METHADONE HYDROCHLORIDE 10 MG: 10 TABLET ORAL at 08:19

## 2020-08-10 ASSESSMENT — PAIN SCALES - GENERAL
PAINLEVEL_OUTOF10: 8
PAINLEVEL_OUTOF10: 10
PAINLEVEL_OUTOF10: 9
PAINLEVEL_OUTOF10: 10
PAINLEVEL_OUTOF10: 9
PAINLEVEL_OUTOF10: 8
PAINLEVEL_OUTOF10: 9
PAINLEVEL_OUTOF10: 8
PAINLEVEL_OUTOF10: 10
PAINLEVEL_OUTOF10: 9

## 2020-08-10 ASSESSMENT — PAIN - FUNCTIONAL ASSESSMENT: PAIN_FUNCTIONAL_ASSESSMENT: ACTIVITIES ARE NOT PREVENTED

## 2020-08-10 ASSESSMENT — PAIN DESCRIPTION - PROGRESSION
CLINICAL_PROGRESSION: NOT CHANGED
CLINICAL_PROGRESSION: NOT CHANGED

## 2020-08-10 NOTE — PROGRESS NOTES
ONCOLOGY FOLLOW-UP:       Primary Oncologist:  Dr. Sarah Winters:       Patient Active Problem List   Diagnosis Code    Sickle cell pain crisis ([facility] D57.00    Asthma J45.909    Sickle cell crisis ([facility] D57.00    Sepsis ([facility] A41.9       INTERVAL HISTORY:       S/p blood transfusion yesterday, Hgb now 7.6. Called placed to room- still in rule out covid. Complains of knee pain still. No other complaints. REVIEW OF SYSTEMS:       10 point ROS completed. Pertinent positives in HPI, otherwise negative. PHYSICAL EXAM:       /67   Pulse 91   Temp 98.9 °F (37.2 °C) (Oral)   Resp 18   Ht 5' 8\" (1.727 m)   Wt 175 lb (79.4 kg)   SpO2 97%   BMI 26.61 kg/m²     PE deferred, covid rule out policy - telemedicine   LABS:     CBC:   Lab Results   Component Value Date    WBC 10.3 08/10/2020    HGB 7.6 (L) 08/10/2020    HCT 22.5 (L) 08/10/2020    .0 (H) 08/10/2020    PLT 90 (L) 08/10/2020    LYMPHOPCT 28.4 08/10/2020    RBC 2.06 (L) 08/10/2020    MCH 36.8 (H) 08/10/2020    MCHC 33.7 08/10/2020    RDW 28.4 (H) 08/10/2020       BMP:   Lab Results   Component Value Date     08/10/2020    K 3.6 08/10/2020    CL 99 08/10/2020    CO2 26 08/10/2020    BUN 6 08/10/2020    CREATININE 0.7 08/10/2020    GLUCOSE 105 08/10/2020    CALCIUM 9.3 08/10/2020        Hepatic Function Panel:   Lab Results   Component Value Date    ALKPHOS 51 08/06/2020    ALT 14 08/06/2020    AST 35 08/06/2020    PROT 7.2 08/06/2020    BILITOT 7.4 08/06/2020    BILIDIR 0.3 07/25/2020    LABALBU 4.5 08/06/2020        IMAGING:     Xr Chest Portable  Result Date: 8/6/2020  No convincing acute cardiopulmonary abnormality. Xr Chest Portable  Result Date: 7/25/2020  Subtle right infrahilar airspace opacity within the medial right lung base. Ct Chest Pulmonary Embolism W Contrast  Result Date: 8/6/2020  No evidence of pulmonary embolism or acute pulmonary abnormality.        ASSESSMENT AND PLAN:       Sickle Cell Crisis Pain  - No indication for red cell exchange and no end organ damage on lab review  - Transfused 1 unit PRBC 8/7/2020, Hgb S negative. - Cont IV fluids  - CTPA negative for infarction   - Blood cx ngtd  - Cont Cefepime and Vanco for empiric coverage  - Await COVID status - still pending   - Cont Hydrea home dose and folic acid - these were not on MAR - reordered  - give 1 unit PRBC with Hgb 6.7 on 8-9, Hgb now 7.6      Pain   - Methadone changed to 10 TID per home dose, managed by a pain specialist outpatient    - Oxy IR 10 mg PO every 4 hours PRN - increase 15 mg today   - Dilaudid 2 mg IVP every 2 hours PRN    - would recommend bilateral knee X-ray once out of COVID isolation        TCP  - plt now 90  - could be secondary to vanco vs. DIC from COVID   - daily cbc for now - check fibrinogen levels.    - check hit      Juan Varner, CNP

## 2020-08-10 NOTE — PROGRESS NOTES
Hospitalist Progress Note      PCP: Christa Mclaughlin MD    Date of Admission: 8/6/2020    Chief Complaint:    Sickle cell crisis      Hospital Course: This is a patient with history of known sickle cell anemia who presented with generalized body pain involving his knees, upper legs, and ribs which he felt was due to exacerbation of his sickle cell anemia. Noted to be febrile in the ER. No overt source of infection identified. He was admitted for further care. Hematology consulted for sickle cell. For possible acute infection, infectious diseases also consulted. On empiric cefepime. Noted to be afebrile for more than 24 hours. Subjective:     Afebrile. Rib pain improving. No chest pain otherwise.   No shortness of breath, no nausea or vomiting      Medications:  Reviewed    Infusion Medications    sodium chloride 100 mL/hr at 08/10/20 0358     Scheduled Medications    hydroxyurea  500 mg Oral Daily    neomycin-bacitracin-polymyxin   Topical BID    sodium chloride  20 mL Intravenous Once    methadone  10 mg Oral TID    folic acid  1 mg Oral Daily    sodium chloride flush  10 mL Intravenous 2 times per day    enoxaparin  40 mg Subcutaneous Daily    cefepime  2 g Intravenous Q12H     PRN Meds: oxyCODONE, [DISCONTINUED] HYDROmorphone **OR** HYDROmorphone, sodium chloride flush, acetaminophen **OR** acetaminophen      Intake/Output Summary (Last 24 hours) at 8/10/2020 1415  Last data filed at 8/10/2020 1230  Gross per 24 hour   Intake 7345 ml   Output 4100 ml   Net 3245 ml       Physical Exam Performed:    BP (!) 110/52   Pulse 98   Temp 97.9 °F (36.6 °C) (Oral)   Resp 18   Ht 5' 8\" (1.727 m)   Wt 175 lb (79.4 kg)   SpO2 100%   BMI 26.61 kg/m²   I performed an audiovisual assessment from outside the patients room, based on new provisions and guidance offered by [facility] on March 18, 2020 in setting of COVID-19 outbreak and in order to preserve personal protective

## 2020-08-10 NOTE — PROGRESS NOTES
Shift assessment completed. Pt A&O x4, VSS. Pt reporting 9/10 bilateral leg pain, PRN oral pain medication given per MAR. Pt using urinal at bedside. Non skid socks on. Denies any needs at this time. Bed locked and in lowest position. Call light and bedside table within reach. Will continue to monitor.

## 2020-08-10 NOTE — CARE COORDINATION
Chart reviewed by  d/t LOS and pt remaining in ICU/C2. Pt being followed by internal medicine, hem/onc and infectious disease. ID following d/t patient fever. covid swab done 8/6 and results still pending at this time. Per provider note, pt follows with [address]. No immediate needs identified by . Please consult CM team if needs arise.      Charley Garner RN CM

## 2020-08-10 NOTE — PROGRESS NOTES
Attempted to call [facility] to get patient's COVID test results, nobody was available to take my phone call. Will notify night shift so they may attempt in the morning.

## 2020-08-10 NOTE — CONSULTS
Infectious Diseases Inpatient Consult Note      Reason for Consult:  Fevers, sickle cell painful crisis    Requesting Physician:      Primary Care Physician:  Payam West MD    History Obtained From:  Medical records   CHIEF COMPLAINT:     Chief Complaint   Patient presents with    Sickle Cell Pain Crisis     Patient comes into ED with c/o sickle cell pain that started last night. HISTORY OF PRESENT ILLNESS:  23 y.o. man with sickle cell admitted with pain crisis has on going pain in his knees, upper extremities and ribs. Fever T max 102.1 on admit now trending down. Creat , lfts normal on admit and Procal 0.77  WBC 20.7  And Hb at  7.8  Drop to 6.8 and 2+ sickle cells on CBC. Retic count at 9.58 %. Blood cx negative - COVID-19 test pending. CT chest -ve and X ray of the Knees no infarcts. Bilirubin at  7.4 noted He is followed at [facility]        Past Medical History:    Past Medical History:   Diagnosis Date    Asthma     Enlarged heart     Sickle cell anemia ([address]    Past Medical History:   Diagnosis Date    Acute chest syndrome 2/2/2009    Asthma    Community acquired pneumonia 1/30/2009    Dactylitis   as a toddler    Enlarged heart   per parent, cardiomegaly on CXR, normal ECHO 10/2011    Headache, migraine like 9/21/2012    Hemoglobin S-S Disease (Sickle Cell Anemia) 5/29/2008    Osteomyelitis of left leg 2/2/2009   Subperiosteal Abscess of Left Thigh    Priapism 5/2003,8/8/20092/2011   hx early priapism, 2009 requiring irrigation/flushing in OR    Sickle cell pain crisis   multiple      Copied from care every where note [facility]      Past Surgical History:    Past Surgical History:   Procedure Laterality Date    SPLENECTOMY         Current Medications:    No outpatient medications have been marked as taking for the 8/6/20 encounter [facility]). Allergies:  Morphine    Immunizations :    There is no immunization history on file for this patient. Social History:    Social History     Tobacco Use    Smoking status: Never Smoker    Smokeless tobacco: Never Used   Substance Use Topics    Alcohol use: Not Currently    Drug use: Never     Social History     Tobacco Use   Smoking Status Never Smoker   Smokeless Tobacco Never Used      Family history :       REVIEW OF SYSTEMS:    No fever / chills / sweats. No weight loss. No visual change, eye pain, eye discharge. No oral lesion, sore throat, dysphagia. Denies cough / sputum/Sob   Denies chest pain, palpitations/ dizziness  Denies nausea/ vomiting/abdominal pain/diarrhea. Denies dysuria or change in urinary function. Denies joint swelling or pain. No myalgia, arthralgia. No rashes, skin lesions   Denies focal weakness, sensory change or other neurologic symptoms  No lymph node swelling or tenderness. Fevers, bone pains   PHYSICAL EXAM:      Vitals:    BP (!) 111/59   Pulse 106   Temp 98.8 °F (37.1 °C) (Oral)   Resp 16   Ht 5' 8\" (1.727 m)   Wt 175 lb (79.4 kg)   SpO2 100%   BMI 26.61 kg/m²     PHYSICAL EXAM:     In-person bedside physical examination deferred. Pursuant to the emergency declaration under the [address] [place] [address], [address] [place] [address] and the [employer] Act, this clinical encounter was conducted to provide necessary medical care. (Also consistent with new provisions and guidance offered by [facility] on March 18, 2020 in setting of COVID 19 outbreak and in order to preserve personal protective equipment in accordance with the flexibilities announced by CMS on March 30, 2020)   References: https://med. [place].[facility]/Portals/0/Resources/COVID-19/3_18%20Telemed%20Guidance%20Updated%20March%2018. pdf?[email or web address] https://[facility]/Portals/0/Resources/COVID-19/3_18%20Telemed%20Guidance%20Updated%20March%2018. pdf?nac=[email or web address]                      http://[email or web address] pdf                              Pulmonary: deferred  Abdomen/GI: deferred  Neuro: deferred  Skin: deferred  Musculoskeletal:  deferred  Genitourinary: Deferred  Psych: deferred  Lymphatic/Immunologic: deferred             DATA:    Lab Results   Component Value Date    WBC 15.5 (H) 08/09/2020    HGB 8.0 (L) 08/09/2020    HCT 23.1 (L) 08/09/2020    .2 (H) 08/09/2020     08/09/2020     Lab Results   Component Value Date    CREATININE 0.6 (L) 08/09/2020    BUN 6 (L) 08/09/2020     08/09/2020    K 3.7 08/09/2020     08/09/2020    CO2 27 08/09/2020       Hepatic Function Panel:   Lab Results   Component Value Date    ALKPHOS 51 08/06/2020    ALT 14 08/06/2020    AST 35 08/06/2020    PROT 7.2 08/06/2020    BILITOT 7.4 08/06/2020    BILIDIR 0.3 07/25/2020    IBILI 4.9 07/25/2020    LABALBU 4.5 08/06/2020     UA:  Lab Results   Component Value Date    COLORU Yellow 08/06/2020    CLARITYU Clear 08/06/2020    GLUCOSEU Negative 08/06/2020    BILIRUBINUR Negative 08/06/2020    KETUA Negative 08/06/2020    SPECGRAV <=1.005 08/06/2020    BLOODU TRACE-LYSED 08/06/2020    PHUR 6.0 08/06/2020    PROTEINU Negative 08/06/2020    UROBILINOGEN 0.2 08/06/2020    NITRU Negative 08/06/2020    LEUKOCYTESUR Negative 08/06/2020    LABMICR YES 08/06/2020    URINETYPE NotGiven 08/06/2020      Urine Microscopic:   Lab Results   Component Value Date    WBCUA None seen 08/06/2020    RBCUA 0-2 08/06/2020         Scheduled Meds:   hydroxyurea  500 mg Oral Daily    neomycin-bacitracin-polymyxin   Topical BID    vancomycin  1,250 mg Intravenous Q8H    sodium chloride  20 mL Intravenous Once    methadone  10 mg Oral TID    folic acid  1 mg Oral Daily    sodium chloride flush  10 mL Intravenous 2 times per day    enoxaparin  40 mg Subcutaneous Daily    cefepime  2 g Intravenous Q12H       Continuous Infusions:   sodium chloride 100 mL/hr at 08/08/20 1858       PRN Meds:  oxyCODONE, [DISCONTINUED] HYDROmorphone **OR** HYDROmorphone, sodium chloride flush, acetaminophen **OR** acetaminophen    MICRO: cultures reviewed and updated by me            Culture, Blood 2 [[id number]]   Collected: 08/06/20 2015    Order Status: Completed  Specimen: Blood  Updated: 08/07/20 2115     Culture, Blood 2  No Growth to date.  Any change in status will be called. Narrative:      ORDER#: [id number]                          ORDERED BY: Stu Valdez   SOURCE: Blood Antecubital-Rig              COLLECTED:  08/06/20 20:15   ANTIBIOTICS AT [facility]. :                      RECEIVED :  08/07/20 07:28   If child <=2 yrs old please draw pediatric bottle. ~Blood Culture #2   Performed at:   [facility]   [address] [place]   Phone [phone]    Culture, Blood 1 [[id number]]   Collected: 08/06/20 1930    Order Status: Completed  Specimen: Blood  Updated: 08/07/20 2015     Blood Culture, Routine  No Growth to date.  Any change in status will be called. Narrative:      ORDER#: [id number]                          ORDERED BY: Stu Valdez   SOURCE: Blood Antecubital-Rig              COLLECTED:  08/06/20 19:30   ANTIBIOTICS AT [facility]. :                      RECEIVED :  08/07/20 03:04   If child <=2 yrs old please draw pediatric bottle. ~Blood Culture #1   Performed at:   [facility]   [address], [place] [address]   Phone [phone]        Urine Culture  No results for input(s): Saud Quintin in the last 72 hours. Imaging:   XR KNEE RIGHT (1-2 VIEWS)   Preliminary Result   No acute abnormality of either knee. No significant arthritic change. XR KNEE LEFT (1-2 VIEWS)   Preliminary Result   No acute abnormality of either knee.   No significant arthritic change. CT CHEST PULMONARY EMBOLISM W CONTRAST   Final Result   No evidence of pulmonary embolism or acute pulmonary abnormality. XR CHEST PORTABLE   Final Result   No convincing acute cardiopulmonary abnormality. All pertinent images and reports for the current Hospitalization were reviewed by me. IMPRESSION:    Patient Active Problem List   Diagnosis    [facility]    Asthma    Sickle cell crisis ([facility]    Sepsis ([facility]     Sickle cell pain crisis  Asthma  CT chest  -ve  Fevers  Elevated Bilirubin  Sickle cell disease  H/o multiple pain crisis  [facility]  Retic count elevated  H/o Splenectomy   MCV elevated   WBC elevation from pain crisis and lack of spleen     I suspect Fevers are from painful crisis and not uncommon due to large amount of Ferritin that is released from cell destruction and risk for complications like chest syndrome or bone infarcts He is s/p Splenectomy need to watch for any gram -ve bacteremias          Labs, Microbiology, Radiology and pertinent results from current hospitalization and care every where were reviewed by me as a part of the consultation. PLAN :  1. D/c IV Vancomycin risk for ROSAURA in sickle cell patients  2. Blood cx in process  3. Check Procal in am  4. Cont IV Cefepime for now will d/c this if Procal normal  5. IVF and hydration important  6. Pain control  7. Check Lfts in AM   8. COVID 19 pending        Discussed with patient/Family and Nursing   Risk of Complications/Morbidity: High      · Illness(es)/ Infection present that pose threat to bodily function. · There is potential for severe exacerbation of infection/side effects of treatment. · Therapy requires intensive monitoring for antimicrobial agent toxicity. Thanks for allowing me to participate in your patient's care please call me with any questions or concerns.     Dr. Cleveland Ledezma MD  [address] [place] [address] Physician  Phone: [phone]   Fax : [phone]

## 2020-08-11 LAB
ANION GAP SERPL CALCULATED.3IONS-SCNC: 9 MMOL/L (ref 3–16)
BASOPHILS ABSOLUTE: 0.1 K/UL (ref 0–0.2)
BASOPHILS RELATIVE PERCENT: 1.1 %
BUN BLDV-MCNC: 8 MG/DL (ref 7–20)
CALCIUM SERPL-MCNC: 9.7 MG/DL (ref 8.3–10.6)
CHLORIDE BLD-SCNC: 101 MMOL/L (ref 99–110)
CO2: 28 MMOL/L (ref 21–32)
CREAT SERPL-MCNC: 0.7 MG/DL (ref 0.9–1.3)
EOSINOPHILS ABSOLUTE: 0.4 K/UL (ref 0–0.6)
EOSINOPHILS RELATIVE PERCENT: 3 %
GFR AFRICAN AMERICAN: >60
GFR NON-AFRICAN AMERICAN: >60
GLUCOSE BLD-MCNC: 107 MG/DL (ref 70–99)
HCT VFR BLD CALC: 22.9 % (ref 40.5–52.5)
HEMATOLOGY PATH CONSULT: NO
HEMOGLOBIN: 7.7 G/DL (ref 13.5–17.5)
LYMPHOCYTES ABSOLUTE: 3.3 K/UL (ref 1–5.1)
LYMPHOCYTES RELATIVE PERCENT: 26.2 %
MCH RBC QN AUTO: 36.5 PG (ref 26–34)
MCHC RBC AUTO-ENTMCNC: 33.7 G/DL (ref 31–36)
MCV RBC AUTO: 108.6 FL (ref 80–100)
MONOCYTES ABSOLUTE: 1.8 K/UL (ref 0–1.3)
MONOCYTES RELATIVE PERCENT: 14.2 %
NEUTROPHILS ABSOLUTE: 7.1 K/UL (ref 1.7–7.7)
NEUTROPHILS RELATIVE PERCENT: 55.5 %
PDW BLD-RTO: 26.2 % (ref 12.4–15.4)
PLATELET # BLD: 165 K/UL (ref 135–450)
PMV BLD AUTO: 9 FL (ref 5–10.5)
POTASSIUM REFLEX MAGNESIUM: 3.9 MMOL/L (ref 3.5–5.1)
RBC # BLD: 2.11 M/UL (ref 4.2–5.9)
SODIUM BLD-SCNC: 138 MMOL/L (ref 136–145)
WBC # BLD: 12.8 K/UL (ref 4–11)

## 2020-08-11 PROCEDURE — 2580000003 HC RX 258: Performed by: NURSE PRACTITIONER

## 2020-08-11 PROCEDURE — 6370000000 HC RX 637 (ALT 250 FOR IP): Performed by: NURSE PRACTITIONER

## 2020-08-11 PROCEDURE — 6360000002 HC RX W HCPCS: Performed by: INTERNAL MEDICINE

## 2020-08-11 PROCEDURE — 80048 BASIC METABOLIC PNL TOTAL CA: CPT

## 2020-08-11 PROCEDURE — 6360000002 HC RX W HCPCS: Performed by: NURSE PRACTITIONER

## 2020-08-11 PROCEDURE — 99231 SBSQ HOSP IP/OBS SF/LOW 25: CPT | Performed by: INTERNAL MEDICINE

## 2020-08-11 PROCEDURE — 1200000000 HC SEMI PRIVATE

## 2020-08-11 PROCEDURE — 6370000000 HC RX 637 (ALT 250 FOR IP): Performed by: INTERNAL MEDICINE

## 2020-08-11 PROCEDURE — U0003 INFECTIOUS AGENT DETECTION BY NUCLEIC ACID (DNA OR RNA); SEVERE ACUTE RESPIRATORY SYNDROME CORONAVIRUS 2 (SARS-COV-2) (CORONAVIRUS DISEASE [COVID-19]), AMPLIFIED PROBE TECHNIQUE, MAKING USE OF HIGH THROUGHPUT TECHNOLOGIES AS DESCRIBED BY CMS-2020-01-R: HCPCS

## 2020-08-11 PROCEDURE — 85025 COMPLETE CBC W/AUTO DIFF WBC: CPT

## 2020-08-11 PROCEDURE — 94770 HC ETCO2 MONITOR DAILY: CPT

## 2020-08-11 RX ORDER — NALOXONE HYDROCHLORIDE 0.4 MG/ML
0.4 INJECTION, SOLUTION INTRAMUSCULAR; INTRAVENOUS; SUBCUTANEOUS PRN
Status: DISCONTINUED | OUTPATIENT
Start: 2020-08-11 | End: 2020-08-14

## 2020-08-11 RX ADMIN — BACITRACIN ZINC, NEOMYCIN SULFATE, AND POLYMYXIN B SULFATE 1 G: 400; 3.5; 5 OINTMENT TOPICAL at 08:52

## 2020-08-11 RX ADMIN — SODIUM CHLORIDE: 9 INJECTION, SOLUTION INTRAVENOUS at 00:34

## 2020-08-11 RX ADMIN — METHADONE HYDROCHLORIDE 10 MG: 10 TABLET ORAL at 15:05

## 2020-08-11 RX ADMIN — OXYCODONE HYDROCHLORIDE 15 MG: 15 TABLET ORAL at 04:40

## 2020-08-11 RX ADMIN — METHADONE HYDROCHLORIDE 10 MG: 10 TABLET ORAL at 21:29

## 2020-08-11 RX ADMIN — OXYCODONE HYDROCHLORIDE 15 MG: 15 TABLET ORAL at 08:51

## 2020-08-11 RX ADMIN — HYDROMORPHONE HYDROCHLORIDE 2 MG: 2 INJECTION, SOLUTION INTRAMUSCULAR; INTRAVENOUS; SUBCUTANEOUS at 05:55

## 2020-08-11 RX ADMIN — FOLIC ACID 1 MG: 1 TABLET ORAL at 08:52

## 2020-08-11 RX ADMIN — CEFEPIME HYDROCHLORIDE 2 G: 2 INJECTION, POWDER, FOR SOLUTION INTRAVENOUS at 08:51

## 2020-08-11 RX ADMIN — OXYCODONE HYDROCHLORIDE 15 MG: 15 TABLET ORAL at 00:31

## 2020-08-11 RX ADMIN — HYDROXYUREA 500 MG: 500 CAPSULE ORAL at 08:52

## 2020-08-11 RX ADMIN — Medication 0.2 MG: at 13:41

## 2020-08-11 RX ADMIN — METHADONE HYDROCHLORIDE 10 MG: 10 TABLET ORAL at 08:52

## 2020-08-11 RX ADMIN — HYDROMORPHONE HYDROCHLORIDE 2 MG: 2 INJECTION, SOLUTION INTRAMUSCULAR; INTRAVENOUS; SUBCUTANEOUS at 01:55

## 2020-08-11 RX ADMIN — BACITRACIN ZINC, NEOMYCIN SULFATE, AND POLYMYXIN B SULFATE: 400; 3.5; 5 OINTMENT TOPICAL at 21:29

## 2020-08-11 RX ADMIN — CEFEPIME HYDROCHLORIDE 2 G: 2 INJECTION, POWDER, FOR SOLUTION INTRAVENOUS at 21:29

## 2020-08-11 RX ADMIN — HYDROMORPHONE HYDROCHLORIDE 2 MG: 2 INJECTION, SOLUTION INTRAMUSCULAR; INTRAVENOUS; SUBCUTANEOUS at 10:03

## 2020-08-11 ASSESSMENT — PAIN DESCRIPTION - PAIN TYPE: TYPE: ACUTE PAIN

## 2020-08-11 ASSESSMENT — PAIN DESCRIPTION - ORIENTATION: ORIENTATION: LEFT

## 2020-08-11 ASSESSMENT — PAIN SCALES - GENERAL
PAINLEVEL_OUTOF10: 8
PAINLEVEL_OUTOF10: 7
PAINLEVEL_OUTOF10: 8
PAINLEVEL_OUTOF10: 9
PAINLEVEL_OUTOF10: 8
PAINLEVEL_OUTOF10: 6
PAINLEVEL_OUTOF10: 8
PAINLEVEL_OUTOF10: 8
PAINLEVEL_OUTOF10: 9
PAINLEVEL_OUTOF10: 7
PAINLEVEL_OUTOF10: 7
PAINLEVEL_OUTOF10: 8
PAINLEVEL_OUTOF10: 8

## 2020-08-11 ASSESSMENT — PAIN DESCRIPTION - LOCATION: LOCATION: LEG;KNEE

## 2020-08-11 ASSESSMENT — PAIN DESCRIPTION - FREQUENCY: FREQUENCY: CONTINUOUS

## 2020-08-11 NOTE — CARE COORDINATION
CASE MANAGEMENT INITIAL ASSESSMENT      Reviewed chart and completed assessment via telephone with: patient via hospital room phone    Explained Case Management role/services. Primary contact information: Davy connor [phone]    Admit date/status: inpatient 8/6/20  Diagnosis: sepsis    Is this a Readmission? Yes. Previous admission 7/25-7/30 for sickle cell crisis. Discharged home. Pt returned to ED with c/o same. See bottom for explanation. Insurance: [employer]. [facility]    Precert required for SNF - Y        3 night stay required - N    Living arrangements, Adls, care needs, prior to admission: lives with mother, indep in [facility] 64: likely private    Durable Medical Equipment at home: [address] in the home and/or outpatient, prior to admission: none    PT/OT recs: [facility] Notification (HEN): n/a    Barriers to discharge: none    Plan/comments: pt states he follows with pain mgmt doctor \"in [place]\" to control his sickle cell. Denies following with [facility]. Pt denies needs or problems obtaining f/u appts or medications needed. Pt states he feels the reason he returned for admission so quickly, is \"doing too much, too soon. I went back to work too quick. \"      Pt denies needs from .      Teja Crabtree RN CM

## 2020-08-11 NOTE — PROGRESS NOTES
ONCOLOGY FOLLOW-UP:       Primary Oncologist:  Dr. Sarmad Torres:       Patient Active Problem List   Diagnosis Code    Sickle cell pain crisis ([facility] D57.00    Asthma J45.909    Sickle cell crisis ([facility] D57.00    Sepsis ([facility] A41.9       INTERVAL HISTORY:       Hgb is 7.7. Blood cx negative. Plt now normal at 165- off Vanco now. Pain is 8/10 to L knee. No HA or chest pain. He reports uncontrolled pain. REVIEW OF SYSTEMS:       10 point ROS completed. Pertinent positives in HPI, otherwise negative. PHYSICAL EXAM:       BP (!) 100/47   Pulse 83   Temp 98.5 °F (36.9 °C) (Oral)   Resp 16   Ht 5' 8\" (1.727 m)   Wt 175 lb (79.4 kg)   SpO2 96%   BMI 26.61 kg/m²     PE deferred, covid rule out policy - telemedicine   LABS:     CBC:   Lab Results   Component Value Date    WBC 12.8 (H) 08/11/2020    HGB 7.7 (L) 08/11/2020    HCT 22.9 (L) 08/11/2020    .6 (H) 08/11/2020     08/11/2020    LYMPHOPCT 26.2 08/11/2020    RBC 2.11 (L) 08/11/2020    MCH 36.5 (H) 08/11/2020    MCHC 33.7 08/11/2020    RDW 26.2 (H) 08/11/2020       BMP:   Lab Results   Component Value Date     08/11/2020    K 3.9 08/11/2020     08/11/2020    CO2 28 08/11/2020    BUN 8 08/11/2020    CREATININE 0.7 08/11/2020    GLUCOSE 107 08/11/2020    CALCIUM 9.7 08/11/2020        Hepatic Function Panel:   Lab Results   Component Value Date    ALKPHOS 51 08/06/2020    ALT 14 08/06/2020    AST 35 08/06/2020    PROT 7.2 08/06/2020    BILITOT 7.4 08/06/2020    BILIDIR 0.3 07/25/2020    LABALBU 4.5 08/06/2020        IMAGING:     Xr Chest Portable  Result Date: 8/6/2020  No convincing acute cardiopulmonary abnormality. Xr Chest Portable  Result Date: 7/25/2020  Subtle right infrahilar airspace opacity within the medial right lung base. Ct Chest Pulmonary Embolism W Contrast  Result Date: 8/6/2020  No evidence of pulmonary embolism or acute pulmonary abnormality.        ASSESSMENT AND PLAN:       Sickle Cell Crisis Pain  - No indication for red cell exchange and no end organ damage on lab review  - Transfused 1 unit PRBC 8/7/2020, Hgb S negative.    - Cont IV fluids  - CTPA negative for infarction   - Blood cx ngtd  - Cont Cefepime - Vanco stopped 8-11   - Await COVID status - still pending - had to retest today   - Cont Hydrea home dose and folic acid - these were not on MAR - reordered  - give 1 unit PRBC with Hgb 6.7 on 8-9, Hgb now 7.7       Pain   - Methadone changed to 10 TID per home dose, managed by a pain specialist outpatient    - Oxy IR 10 mg PO every 4 hours PRN - increase 15 mg 8-10- cont same dose today   - Dilaudid 2 mg IVP every 2 hours PRN  - STOP TODAY AND CHANGE TO PCA   - would recommend bilateral knee X-ray once out of COVID isolation     - Now on PCA Dilauid 0.2 mg every 10 min - no cont dose- 1 hour lock out of 1. 5 mg      TCP  - resolved after Vanco stopped, plt now [address], [facility]

## 2020-08-11 NOTE — PROGRESS NOTES
Infectious Disease Follow up Notes    CC :  Fever, sickle cell crisis, r/o COVID     Antibiotics:   Cefepime 2g q12    Admit Date:   8/6/2020  Hospital Day: 6    Subjective:   He is afebrile on room air   Without cough or SOB     Objective:     Patient Vitals for the past 8 hrs:   BP Temp Temp src Pulse Resp SpO2   08/11/20 1700 (!) 101/54 -- -- 84 14 --   08/11/20 1600 108/60 -- -- 93 15 100 %   08/11/20 1457 118/60 -- -- 86 14 100 %   08/11/20 1300 -- 98.7 °F (37.1 °C) Oral -- 16 94 %   08/11/20 1155 -- -- -- -- -- 96 %     Due to the current efforts to prevent transmission of COVID-19 and also the need to preserve PPE for other caregivers, a face-to-face encounter with the patient was not performed. That being said, all relevant records and diagnostic tests were reviewed, including laboratory results and imaging. Please reference any relevant documentation elsewhere. Care will be coordinated with the primary service.          Scheduled Meds:   hydroxyurea  500 mg Oral Daily    neomycin-bacitracin-polymyxin   Topical BID    sodium chloride  20 mL Intravenous Once    methadone  10 mg Oral TID    folic acid  1 mg Oral Daily    sodium chloride flush  10 mL Intravenous 2 times per day    enoxaparin  40 mg Subcutaneous Daily    cefepime  2 g Intravenous Q12H       Continuous Infusions:   HYDROmorphone      sodium chloride 100 mL/hr at 08/11/20 0034          Data Review:    Lab Results   Component Value Date    WBC 12.8 (H) 08/11/2020    HGB 7.7 (L) 08/11/2020    HCT 22.9 (L) 08/11/2020    .6 (H) 08/11/2020     08/11/2020     Lab Results   Component Value Date    CREATININE 0.7 (L) 08/11/2020    BUN 8 08/11/2020     08/11/2020    K 3.9 08/11/2020     08/11/2020    CO2 28 08/11/2020       Hepatic Function Panel:   Lab Results   Component Value Date    ALKPHOS 51 08/06/2020    ALT 14 08/06/2020    AST 35 08/06/2020    PROT 7.2 08/06/2020    BILITOT 7.4 08/06/2020    BILIDIR 0.3 07/25/2020    IBILI 4.9 07/25/2020    LABALBU 4.5 08/06/2020         MICRO:  8/6 BC x2 neg       IMAGING:  Chest CT negative     Xray knees neg      Assessment:     Patient Active Problem List    Diagnosis Date Noted    Sepsis ([facility] 08/06/2020    Sickle cell crisis ([facility] 07/28/2020    Sickle cell pain crisis ([facility] 07/25/2020    Asthma 07/25/2020     Admitted 8/6/20 with arthralgia and fever c/w SC crisis     Moderate elevation of procal on admission  Diagnostics for source of infection are negative to date   COVID PCR sent, pending    S/p splenectomy    Mild elevation of WBC from SCC in context of splenectomy    Plan:   COVID resent  Maintaining isolation as ordered though clinical suspicion is low    If COVID confirmed, supportive care recommended     DC cefepime and monitor expectantly off of abx       Little more to add from ID standpoint   Will sign off, please call with questions         Adriane Guardado MD  Phone: [phone]   Fax : [phone]

## 2020-08-11 NOTE — PROGRESS NOTES
Hospitalist Progress Note      PCP: Payam West MD    Date of Admission: 8/6/2020    Chief Complaint:    Sickle cell crisis      Hospital Course: This is a patient with history of known sickle cell anemia who presented with generalized body pain involving his knees, upper legs, and ribs which he felt was due to exacerbation of his sickle cell anemia. Noted to be febrile in the ER. No overt source of infection identified. He was admitted for further care. Hematology consulted for sickle cell. For possible acute infection, infectious diseases also consulted. On empiric cefepime. Noted to be afebrile for more than 24 hours. Subjective:  Ongoing pain, mostly in the legs, especially the thighs. It feels like his usual sickle cell crisis pain. Hematology started PCA. No coughing or dyspnea. Reordered COVID, but this will not influence the timing of his discharge, he can isolate at home if needed. Medications:  Reviewed    Infusion Medications    HYDROmorphone      sodium chloride 100 mL/hr at 08/11/20 0034     Scheduled Medications    hydroxyurea  500 mg Oral Daily    neomycin-bacitracin-polymyxin   Topical BID    sodium chloride  20 mL Intravenous Once    methadone  10 mg Oral TID    folic acid  1 mg Oral Daily    sodium chloride flush  10 mL Intravenous 2 times per day    enoxaparin  40 mg Subcutaneous Daily    cefepime  2 g Intravenous Q12H     PRN Meds: naloxone, oxyCODONE, sodium chloride flush, acetaminophen **OR** acetaminophen      Intake/Output Summary (Last 24 hours) at 8/11/2020 1508  Last data filed at 8/11/2020 1504  Gross per 24 hour   Intake 1876.1 ml   Output 3025 ml   Net -1148.9 ml       Physical Exam Performed:    /60   Pulse 86   Temp 98.7 °F (37.1 °C) (Oral)   Resp 14   Ht 5' 8\" (1.727 m)   Wt 175 lb (79.4 kg)   SpO2 100%   BMI 26.61 kg/m²       General appearance: No apparent distress, appears stated age and cooperative.   HEENT: Pupils equal, round, and reactive to light. Conjunctivae/corneas clear. Neck: Supple, with full range of motion. No jugular venous distention. Trachea midline. Respiratory:  Normal respiratory effort. Clear to auscultation, bilaterally without Rales/Wheezes/Rhonchi. Cardiovascular: Regular rate and rhythm with normal S1/S2 without murmurs, rubs or gallops. Abdomen: Soft, non-tender, non-distended with normal bowel sounds. Musculoskeletal: No clubbing, cyanosis or edema bilaterally. Full range of motion without deformity. Skin: Skin color, texture, turgor normal.  No rashes. R cheek has abrasion. Neurologic:  Neurovascularly intact without any focal sensory/motor deficits. Cranial nerves: II-XII intact, grossly non-focal.  Psychiatric: Alert and oriented, thought content appropriate, normal insight. Flat affect. Capillary Refill: Brisk,< 3 seconds   Peripheral Pulses: +2 palpable, equal bilaterally         Labs:   Recent Labs     08/09/20  0845 08/09/20  1400 08/10/20  0400 08/11/20  0602   WBC 15.5*  --  10.3 12.8*   HGB 6.7* 8.0* 7.6* 7.7*   HCT 19.4* 23.1* 22.5* 22.9*     --  90* 165     Recent Labs     08/09/20  0845 08/10/20  0510 08/11/20  0602    135* 138   K 3.7 3.6 3.9    99 101   CO2 27 26 28   BUN 6* 6* 8   CREATININE 0.6* 0.7* 0.7*   CALCIUM 10.1 9.3 9.7     No results for input(s): AST, ALT, BILIDIR, BILITOT, ALKPHOS in the last 72 hours. No results for input(s): INR in the last 72 hours. No results for input(s): Lilliam Formosa in the last 72 hours. Urinalysis:      Lab Results   Component Value Date    NITRU Negative 08/06/2020    WBCUA None seen 08/06/2020    RBCUA 0-2 08/06/2020    BLOODU TRACE-LYSED 08/06/2020    SPECGRAV <=1.005 08/06/2020    GLUCOSEU Negative 08/06/2020       Radiology:  XR KNEE RIGHT (1-2 VIEWS)   Final Result   No acute abnormality of either knee. No significant arthritic change.          XR KNEE LEFT (1-2 VIEWS)   Final Result   No acute abnormality of either knee. No significant arthritic change. CT CHEST PULMONARY EMBOLISM W CONTRAST   Final Result   No evidence of pulmonary embolism or acute pulmonary abnormality. XR CHEST PORTABLE   Final Result   No convincing acute cardiopulmonary abnormality. Assessment/Plan:    Active Hospital Problems    Diagnosis    Sepsis ([facility] [address] [A41.9]    Sickle cell crisis ([facility] [address] [D57.00]       PLAN      SIRS without infection, present on arrival  - procalcitonin 0.77. Rreated with IV cefepime. ID consult appreciated. - vanc was stopped, thrombocytopenia improved  - COVID swab was lost, reordered 8/12. He doesn't necessarily need to wait here for the results, he could isolate at home if otherwise ready for discharge. Sickle cell crisis with pain and anemia  Opioid regimen per hematology, currently on hydromorphone PCA  Required transfusion, trend Hb    Hyponatremia  Borderline. Continue IV hydration. Not critical.    Abrasion on R cheek  - prophylactic antibiotic ointment. DVT Prophylaxis: lovenox  Diet: DIET GENERAL;  Code Status: Full Code    PT/OT Eval Status: not indicated    Dispo - when his pain is controllable with oral meds, appreciate hematology input. He lives at home.        Shi Merrill MD

## 2020-08-11 NOTE — PROGRESS NOTES
[facility] or Facility: [facility] From: Jamia Mak RE: [facility] 1999 RM: [id number] Patient COVID swab has been misplaced by diagnostics. Do you want to retest this patient (out of re agent), or do a rapid test and discharge him? He is stable at this time. Thanks! Need Callback: NO CALLBACK REQ CCU  Electronically signed by Jamia Mak RN on 8/11/2020 at 9:18 AM     [facility] Reply:   'Repeat normal Covid test. No rapid. I will look through all the details later today.  Thanks'

## 2020-08-11 NOTE — PLAN OF CARE
Problem: Pain:  Intervention: Promote participation in pain management plan  Note: PT encouraged to reposition frequently in bed to decrease leg discomfort. RN offered to assist pt to ambulate, pt declined. PT offered cold/heat packs, extra pillows. Discussed with pt PRN pain medication vs scheduled methadone. PT educated orders for PO pain medication to be given first and IV for breakthrough pain after 1 hour.

## 2020-08-12 LAB
ANION GAP SERPL CALCULATED.3IONS-SCNC: 10 MMOL/L (ref 3–16)
BASOPHILS ABSOLUTE: 0.1 K/UL (ref 0–0.2)
BASOPHILS RELATIVE PERCENT: 0.9 %
BUN BLDV-MCNC: 8 MG/DL (ref 7–20)
CALCIUM SERPL-MCNC: 9.7 MG/DL (ref 8.3–10.6)
CHLORIDE BLD-SCNC: 102 MMOL/L (ref 99–110)
CO2: 29 MMOL/L (ref 21–32)
CREAT SERPL-MCNC: 0.6 MG/DL (ref 0.9–1.3)
EOSINOPHILS ABSOLUTE: 0.3 K/UL (ref 0–0.6)
EOSINOPHILS RELATIVE PERCENT: 3 %
GFR AFRICAN AMERICAN: >60
GFR NON-AFRICAN AMERICAN: >60
GLUCOSE BLD-MCNC: 103 MG/DL (ref 70–99)
HCT VFR BLD CALC: 21 % (ref 40.5–52.5)
HEMATOLOGY PATH CONSULT: NO
HEMOGLOBIN: 7.1 G/DL (ref 13.5–17.5)
LYMPHOCYTES ABSOLUTE: 3 K/UL (ref 1–5.1)
LYMPHOCYTES RELATIVE PERCENT: 28 %
MCH RBC QN AUTO: 37.3 PG (ref 26–34)
MCHC RBC AUTO-ENTMCNC: 33.9 G/DL (ref 31–36)
MCV RBC AUTO: 110 FL (ref 80–100)
MONOCYTES ABSOLUTE: 1.8 K/UL (ref 0–1.3)
MONOCYTES RELATIVE PERCENT: 16.4 %
NEUTROPHILS ABSOLUTE: 5.5 K/UL (ref 1.7–7.7)
NEUTROPHILS RELATIVE PERCENT: 51.7 %
PDW BLD-RTO: 24 % (ref 12.4–15.4)
PLATELET # BLD: 158 K/UL (ref 135–450)
PLATELET SLIDE REVIEW: ADEQUATE
PMV BLD AUTO: 9.2 FL (ref 5–10.5)
POTASSIUM REFLEX MAGNESIUM: 3.7 MMOL/L (ref 3.5–5.1)
RBC # BLD: 1.91 M/UL (ref 4.2–5.9)
SARS-COV-2, NAA: NOT DETECTED
SLIDE REVIEW: ABNORMAL
SODIUM BLD-SCNC: 141 MMOL/L (ref 136–145)
WBC # BLD: 10.7 K/UL (ref 4–11)

## 2020-08-12 PROCEDURE — 85025 COMPLETE CBC W/AUTO DIFF WBC: CPT

## 2020-08-12 PROCEDURE — 6360000002 HC RX W HCPCS: Performed by: NURSE PRACTITIONER

## 2020-08-12 PROCEDURE — 2580000003 HC RX 258: Performed by: NURSE PRACTITIONER

## 2020-08-12 PROCEDURE — 6370000000 HC RX 637 (ALT 250 FOR IP): Performed by: NURSE PRACTITIONER

## 2020-08-12 PROCEDURE — 94770 HC ETCO2 MONITOR DAILY: CPT

## 2020-08-12 PROCEDURE — 6370000000 HC RX 637 (ALT 250 FOR IP): Performed by: INTERNAL MEDICINE

## 2020-08-12 PROCEDURE — 80048 BASIC METABOLIC PNL TOTAL CA: CPT

## 2020-08-12 PROCEDURE — 94761 N-INVAS EAR/PLS OXIMETRY MLT: CPT

## 2020-08-12 PROCEDURE — 1200000000 HC SEMI PRIVATE

## 2020-08-12 PROCEDURE — 2700000000 HC OXYGEN THERAPY PER DAY

## 2020-08-12 PROCEDURE — 36415 COLL VENOUS BLD VENIPUNCTURE: CPT

## 2020-08-12 RX ORDER — DIPHENHYDRAMINE HCL 25 MG
25 TABLET ORAL EVERY 6 HOURS PRN
Status: DISCONTINUED | OUTPATIENT
Start: 2020-08-12 | End: 2020-08-26 | Stop reason: HOSPADM

## 2020-08-12 RX ADMIN — SODIUM CHLORIDE: 9 INJECTION, SOLUTION INTRAVENOUS at 09:31

## 2020-08-12 RX ADMIN — CEFEPIME HYDROCHLORIDE 2 G: 2 INJECTION, POWDER, FOR SOLUTION INTRAVENOUS at 09:31

## 2020-08-12 RX ADMIN — FOLIC ACID 1 MG: 1 TABLET ORAL at 09:32

## 2020-08-12 RX ADMIN — SODIUM CHLORIDE: 9 INJECTION, SOLUTION INTRAVENOUS at 02:46

## 2020-08-12 RX ADMIN — Medication 10 ML: at 20:55

## 2020-08-12 RX ADMIN — OXYCODONE HYDROCHLORIDE 15 MG: 15 TABLET ORAL at 22:59

## 2020-08-12 RX ADMIN — METHADONE HYDROCHLORIDE 10 MG: 10 TABLET ORAL at 14:34

## 2020-08-12 RX ADMIN — Medication: at 02:57

## 2020-08-12 RX ADMIN — METHADONE HYDROCHLORIDE 10 MG: 10 TABLET ORAL at 09:32

## 2020-08-12 RX ADMIN — BACITRACIN ZINC, NEOMYCIN SULFATE, AND POLYMYXIN B SULFATE 1 G: 400; 3.5; 5 OINTMENT TOPICAL at 21:16

## 2020-08-12 RX ADMIN — BACITRACIN ZINC, NEOMYCIN SULFATE, AND POLYMYXIN B SULFATE: 400; 3.5; 5 OINTMENT TOPICAL at 13:00

## 2020-08-12 RX ADMIN — METHADONE HYDROCHLORIDE 10 MG: 10 TABLET ORAL at 20:57

## 2020-08-12 RX ADMIN — HYDROXYUREA 500 MG: 500 CAPSULE ORAL at 09:32

## 2020-08-12 ASSESSMENT — PAIN DESCRIPTION - LOCATION: LOCATION: LEG

## 2020-08-12 ASSESSMENT — PAIN DESCRIPTION - PAIN TYPE: TYPE: ACUTE PAIN

## 2020-08-12 ASSESSMENT — PAIN DESCRIPTION - ORIENTATION: ORIENTATION: LEFT

## 2020-08-12 NOTE — PROGRESS NOTES
ONCOLOGY HEMATOLOGY CARE PROGRESS NOTE      SUBJECTIVE:      The patient states that he feels better. His rib pain is gone he is just left with a little bit of knee pain. ROS:   The remaining 10 point review of symptoms is unremarkable. OBJECTIVE        Physical    VITALS:  /72   Pulse 104   Temp 98.5 °F (36.9 °C) (Oral)   Resp 17   Ht 5' 8\" (1.727 m)   Wt 169 lb 8 oz (76.9 kg)   SpO2 97%   BMI 25.77 kg/m²   TEMPERATURE:  Current - Temp: 98.5 °F (36.9 °C); Max - Temp  Av.6 °F (37 °C)  Min: 98.5 °F (36.9 °C)  Max: 98.7 °F (37.1 °C)  PULSE OXIMETRY RANGE: SpO2  Av.6 %  Min: 94 %  Max: 100 %  24HR INTAKE/OUTPUT:      Intake/Output Summary (Last 24 hours) at 2020 4739  Last data filed at 2020 0730  Gross per 24 hour   Intake 3384.55 ml   Output 3675 ml   Net -290.45 ml       CONSTITUTIONAL:  awake, alert, cooperative, no apparent distress, HEENT oral pharynx , no scleral icterus  HEMATOLOGIC/LYMPHATICS:  no cervical lymphadenopathy, no supraclavicular lymphadenopathy, no axillary lymphadenopathy and no inguinal lymphadenopathy  LUNGS:  No increased work of breathing, good air exchange, clear to auscultation bilaterally, no crackles or wheezing  CARDIOVASCULAR:  , regular rate and rhythm, normal S1 and S2, no S3 or S4, and no murmur noted  ABDOMEN:  No scars, normal bowel sounds, soft, non-distended, non-tender, no masses palpated, no hepatosplenomegally  MUSCULOSKELETAL:  There is no redness, warmth, or swelling of the joints. EXTREMETIES: No clubbing cynosis or edema  NEUROLOGIC:  Awake, alert, oriented to name, place and time. Cranial nerves II-XII are grossly intact. Motor is 5 out of 5 bilaterally.    SKIN:  no bruising or bleeding      Data      Recent Labs     20  1400 08/10/20  0400 20  0602   WBC  --  10.3 12.8*   HGB 8.0* 7.6* 7.7*   HCT 23.1* 22.5* 22.9*   PLT  --  90* 165   MCV  --  109.0* 108.6*        Recent Labs 08/10/20  0510 08/11/20  0602   * 138   K 3.6 3.9   CL 99 101   CO2 26 28   BUN 6* 8   CREATININE 0.7* 0.7*     No results for input(s): AST, ALT, ALB, BILIDIR, BILITOT, ALKPHOS in the last 72 hours.     Magnesium:    Lab Results   Component Value Date    MG 2.10 07/30/2020    MG 2.00 07/29/2020    MG 1.90 07/28/2020         Problem List  Patient Active Problem List   Diagnosis    S[facility] crisis ([facility] [address] [facility]    Asthma    [facility]ick[facility] crisis ([facility]    Sepsis ([facility]       ASSESSMENT AND PLAN:    Sickle cell crisis:  -No evidence of acute chest pain syndrome  -The patient states he is getting better  -His CBC is stable  -CTPA is unremarkable  -Continue with Hydrea and folic acid    Pain:  -Continue with his home medications  -This is methadone 10 mg 3 times daily and oxycodone 10 mg p.o. every 4 as needed  -He is on a Dilaudid PCA and hopefully this can be stopped tomorrow    Infectious disease:  -COVID testing x2 is pending  -Suspicion is low per ID  -He is not on antibiotics          ONCOLOGIC DISPOSITION:    -Once we can get his IV pain medication stopped  -He will follow-up with Dr. Gatito Alvarez MD  Please contact through [facility]

## 2020-08-12 NOTE — CARE COORDINATION
CM chart review. Per provider documentation yesterday, pt disposition pending pain control on ORAL medication. Q2 Prn IV dilaudid discontinued yesterday. Pt now on PCA pump of dilaudid. Per conversation with patient yesterday, he lives with family and intends to discharge home with ready.     Tressa Monsivais RN CM

## 2020-08-12 NOTE — PROGRESS NOTES
Pt. Resting in bed. Oriented and drowsy; repeatedly falling asleep mid conversation. Vitals and assessment stable as charted. States pain is 8/10 and mostly in the left leg which is not new for him he says; his pain is managed with a dilaudid PCA currently. Call light in reach. Will continue to monitor.

## 2020-08-12 NOTE — PROGRESS NOTES
Hospitalist Progress Note      PCP: Aly Wilson MD    Date of Admission: 8/6/2020    Chief Complaint:    Sickle cell crisis      Hospital Course: This is a patient with history of known sickle cell anemia who presented with generalized body pain involving his knees, upper legs, and ribs which he felt was due to exacerbation of his sickle cell anemia. Noted to be febrile in the ER. No overt source of infection identified. He was admitted for further care. Hematology consulted for sickle cell. For possible acute infection, infectious diseases also consulted. On empiric cefepime. Noted to be afebrile for more than 24 hours. Subjective:  Chart reviewed. It appears that his pain is improving, with the current pain regimen. Medications:  Reviewed    Infusion Medications    HYDROmorphone      sodium chloride 100 mL/hr at 08/12/20 0931     Scheduled Medications    hydroxyurea  500 mg Oral Daily    neomycin-bacitracin-polymyxin   Topical BID    sodium chloride  20 mL Intravenous Once    methadone  10 mg Oral TID    folic acid  1 mg Oral Daily    sodium chloride flush  10 mL Intravenous 2 times per day    enoxaparin  40 mg Subcutaneous Daily     PRN Meds: diphenhydrAMINE, naloxone, oxyCODONE, sodium chloride flush, acetaminophen **OR** acetaminophen      Intake/Output Summary (Last 24 hours) at 8/12/2020 1927  Last data filed at 8/12/2020 1640  Gross per 24 hour   Intake 4477.8 ml   Output 4175 ml   Net 302.8 ml       Physical Exam Performed:    BP (!) 118/56   Pulse 79   Temp 98.9 °F (37.2 °C) (Oral)   Resp 14   Ht 5' 8\" (1.727 m)   Wt 169 lb 8 oz (76.9 kg)   SpO2 97%   BMI 25.77 kg/m²       In-person bedside physical examination deferred.   Pursuant to the emergency declaration under the [facility] authority and the [employer], this clinical encounter was conducted to VIEWS)   Final Result   No acute abnormality of either knee. No significant arthritic change. CT CHEST PULMONARY EMBOLISM W CONTRAST   Final Result   No evidence of pulmonary embolism or acute pulmonary abnormality. XR CHEST PORTABLE   Final Result   No convincing acute cardiopulmonary abnormality. Assessment/Plan:    Active Hospital Problems    Diagnosis    Sepsis ([facility] [address] [A41.9]    Sickle cell crisis ([facility] [address] [D57.00]       PLAN    SIRS without infection, present on arrival  - procalcitonin 0.77.    -IV cefepime. -ID consult appreciated. - vanc was stopped, thrombocytopenia improved  - COVID swab was lost, reordered 8/12. He doesn't necessarily need to wait here for the results, he could isolate at home if otherwise ready for discharge. Sickle cell crisis with pain and anemia  Opioid regimen per hematology, currently on hydromorphone PCA  Required transfusion, trend Hb  -hydroxurea  -methadone    Hyponatremia now resolved. -monitor. Abrasion on R cheek  - prophylactic antibiotic ointment. DVT Prophylaxis: lovenox  Diet: DIET GENERAL;  Code Status: Full Code    PT/OT Eval Status: not indicated    Dispo - once pain is controllable with oral meds, appreciate hematology input.       Yolande Essex, MD

## 2020-08-13 ENCOUNTER — APPOINTMENT (OUTPATIENT)
Dept: GENERAL RADIOLOGY | Age: 20
DRG: 812 | End: 2020-08-13
Payer: COMMERCIAL

## 2020-08-13 LAB
ABO/RH: NORMAL
ALBUMIN SERPL-MCNC: 4 G/DL (ref 3.4–5)
ALP BLD-CCNC: 89 U/L (ref 40–129)
ALT SERPL-CCNC: 55 U/L (ref 10–40)
ANION GAP SERPL CALCULATED.3IONS-SCNC: 13 MMOL/L (ref 3–16)
ANISOCYTOSIS: ABNORMAL
ANTIBODY SCREEN: NORMAL
AST SERPL-CCNC: 56 U/L (ref 15–37)
BANDED NEUTROPHILS RELATIVE PERCENT: 4 % (ref 0–7)
BASOPHILS ABSOLUTE: 0 K/UL (ref 0–0.2)
BASOPHILS RELATIVE PERCENT: 0 %
BILIRUB SERPL-MCNC: 4.2 MG/DL (ref 0–1)
BILIRUBIN DIRECT: 0.7 MG/DL (ref 0–0.3)
BILIRUBIN, INDIRECT: 3.5 MG/DL (ref 0–1)
BLOOD BANK DISPENSE STATUS: NORMAL
BLOOD BANK PRODUCT CODE: NORMAL
BPU ID: NORMAL
BUN BLDV-MCNC: 7 MG/DL (ref 7–20)
CALCIUM SERPL-MCNC: 9.9 MG/DL (ref 8.3–10.6)
CHLORIDE BLD-SCNC: 101 MMOL/L (ref 99–110)
CO2: 25 MMOL/L (ref 21–32)
CREAT SERPL-MCNC: 0.6 MG/DL (ref 0.9–1.3)
DESCRIPTION BLOOD BANK: NORMAL
EOSINOPHILS ABSOLUTE: 0.3 K/UL (ref 0–0.6)
EOSINOPHILS RELATIVE PERCENT: 2 %
GFR AFRICAN AMERICAN: >60
GFR NON-AFRICAN AMERICAN: >60
GLUCOSE BLD-MCNC: 113 MG/DL (ref 70–99)
HCT VFR BLD CALC: 21.8 % (ref 40.5–52.5)
HEMATOLOGY PATH CONSULT: NO
HEMOGLOBIN: 7.2 G/DL (ref 13.5–17.5)
LYMPHOCYTES ABSOLUTE: 3.1 K/UL (ref 1–5.1)
LYMPHOCYTES RELATIVE PERCENT: 22 %
MACROCYTES: ABNORMAL
MCH RBC QN AUTO: 36.2 PG (ref 26–34)
MCHC RBC AUTO-ENTMCNC: 33.1 G/DL (ref 31–36)
MCV RBC AUTO: 109.4 FL (ref 80–100)
MONOCYTES ABSOLUTE: 1.3 K/UL (ref 0–1.3)
MONOCYTES RELATIVE PERCENT: 9 %
NEUTROPHILS ABSOLUTE: 9.5 K/UL (ref 1.7–7.7)
NEUTROPHILS RELATIVE PERCENT: 63 %
NUCLEATED RED BLOOD CELLS: 1 /100 WBC
NUCLEATED RED BLOOD CELLS: 1 /100 WBC
PDW BLD-RTO: 21.9 % (ref 12.4–15.4)
PLATELET # BLD: 190 K/UL (ref 135–450)
PLATELET SLIDE REVIEW: ADEQUATE
PMV BLD AUTO: 9.6 FL (ref 5–10.5)
POLYCHROMASIA: ABNORMAL
POTASSIUM REFLEX MAGNESIUM: 4.1 MMOL/L (ref 3.5–5.1)
RBC # BLD: 1.99 M/UL (ref 4.2–5.9)
SCHISTOCYTES: ABNORMAL
SLIDE REVIEW: ABNORMAL
SODIUM BLD-SCNC: 139 MMOL/L (ref 136–145)
TARGET CELLS: ABNORMAL
TOTAL PROTEIN: 7.3 G/DL (ref 6.4–8.2)
WBC # BLD: 14.2 K/UL (ref 4–11)

## 2020-08-13 PROCEDURE — 6370000000 HC RX 637 (ALT 250 FOR IP): Performed by: NURSE PRACTITIONER

## 2020-08-13 PROCEDURE — 2580000003 HC RX 258: Performed by: NURSE PRACTITIONER

## 2020-08-13 PROCEDURE — 6370000000 HC RX 637 (ALT 250 FOR IP): Performed by: INTERNAL MEDICINE

## 2020-08-13 PROCEDURE — 2580000003 HC RX 258: Performed by: FAMILY MEDICINE

## 2020-08-13 PROCEDURE — 36430 TRANSFUSION BLD/BLD COMPNT: CPT

## 2020-08-13 PROCEDURE — 1200000000 HC SEMI PRIVATE

## 2020-08-13 PROCEDURE — 2700000000 HC OXYGEN THERAPY PER DAY

## 2020-08-13 PROCEDURE — 86900 BLOOD TYPING SEROLOGIC ABO: CPT

## 2020-08-13 PROCEDURE — P9016 RBC LEUKOCYTES REDUCED: HCPCS

## 2020-08-13 PROCEDURE — 86901 BLOOD TYPING SEROLOGIC RH(D): CPT

## 2020-08-13 PROCEDURE — 86850 RBC ANTIBODY SCREEN: CPT

## 2020-08-13 PROCEDURE — 6360000002 HC RX W HCPCS: Performed by: NURSE PRACTITIONER

## 2020-08-13 PROCEDURE — 6370000000 HC RX 637 (ALT 250 FOR IP): Performed by: FAMILY MEDICINE

## 2020-08-13 PROCEDURE — 73080 X-RAY EXAM OF ELBOW: CPT

## 2020-08-13 PROCEDURE — 86923 COMPATIBILITY TEST ELECTRIC: CPT

## 2020-08-13 PROCEDURE — 6360000002 HC RX W HCPCS: Performed by: FAMILY MEDICINE

## 2020-08-13 PROCEDURE — 36415 COLL VENOUS BLD VENIPUNCTURE: CPT

## 2020-08-13 PROCEDURE — 80048 BASIC METABOLIC PNL TOTAL CA: CPT

## 2020-08-13 PROCEDURE — 85025 COMPLETE CBC W/AUTO DIFF WBC: CPT

## 2020-08-13 PROCEDURE — 73562 X-RAY EXAM OF KNEE 3: CPT

## 2020-08-13 PROCEDURE — 6360000002 HC RX W HCPCS: Performed by: INTERNAL MEDICINE

## 2020-08-13 PROCEDURE — 94770 HC ETCO2 MONITOR DAILY: CPT

## 2020-08-13 PROCEDURE — 73060 X-RAY EXAM OF HUMERUS: CPT

## 2020-08-13 PROCEDURE — 80076 HEPATIC FUNCTION PANEL: CPT

## 2020-08-13 PROCEDURE — 94761 N-INVAS EAR/PLS OXIMETRY MLT: CPT

## 2020-08-13 RX ORDER — OXYCODONE HYDROCHLORIDE 15 MG/1
30 TABLET ORAL EVERY 4 HOURS PRN
Status: DISCONTINUED | OUTPATIENT
Start: 2020-08-13 | End: 2020-08-14

## 2020-08-13 RX ORDER — 0.9 % SODIUM CHLORIDE 0.9 %
20 INTRAVENOUS SOLUTION INTRAVENOUS ONCE
Status: COMPLETED | OUTPATIENT
Start: 2020-08-13 | End: 2020-08-13

## 2020-08-13 RX ORDER — HYDROMORPHONE HCL 110MG/55ML
0.5 PATIENT CONTROLLED ANALGESIA SYRINGE INTRAVENOUS ONCE
Status: COMPLETED | OUTPATIENT
Start: 2020-08-13 | End: 2020-08-13

## 2020-08-13 RX ORDER — DIPHENHYDRAMINE HCL 25 MG
25 TABLET ORAL ONCE
Status: COMPLETED | OUTPATIENT
Start: 2020-08-13 | End: 2020-08-13

## 2020-08-13 RX ORDER — ACETAMINOPHEN 325 MG/1
650 TABLET ORAL ONCE
Status: COMPLETED | OUTPATIENT
Start: 2020-08-13 | End: 2020-08-13

## 2020-08-13 RX ADMIN — FOLIC ACID 1 MG: 1 TABLET ORAL at 07:56

## 2020-08-13 RX ADMIN — Medication: at 19:34

## 2020-08-13 RX ADMIN — METHADONE HYDROCHLORIDE 10 MG: 10 TABLET ORAL at 20:42

## 2020-08-13 RX ADMIN — METHADONE HYDROCHLORIDE 10 MG: 10 TABLET ORAL at 07:56

## 2020-08-13 RX ADMIN — SODIUM CHLORIDE: 9 INJECTION, SOLUTION INTRAVENOUS at 00:37

## 2020-08-13 RX ADMIN — OXYCODONE HYDROCHLORIDE 30 MG: 15 TABLET ORAL at 21:58

## 2020-08-13 RX ADMIN — SODIUM CHLORIDE 20 ML: 9 INJECTION, SOLUTION INTRAVENOUS at 16:55

## 2020-08-13 RX ADMIN — BACITRACIN ZINC, NEOMYCIN SULFATE, AND POLYMYXIN B SULFATE: 400; 3.5; 5 OINTMENT TOPICAL at 21:58

## 2020-08-13 RX ADMIN — METHADONE HYDROCHLORIDE 10 MG: 10 TABLET ORAL at 15:04

## 2020-08-13 RX ADMIN — OXYCODONE HYDROCHLORIDE 15 MG: 15 TABLET ORAL at 11:15

## 2020-08-13 RX ADMIN — Medication 10 ML: at 08:06

## 2020-08-13 RX ADMIN — OXYCODONE HYDROCHLORIDE 15 MG: 15 TABLET ORAL at 03:08

## 2020-08-13 RX ADMIN — HYDROMORPHONE HYDROCHLORIDE 0.5 MG: 2 INJECTION, SOLUTION INTRAMUSCULAR; INTRAVENOUS; SUBCUTANEOUS at 19:05

## 2020-08-13 RX ADMIN — OXYCODONE HYDROCHLORIDE 30 MG: 15 TABLET ORAL at 16:00

## 2020-08-13 RX ADMIN — SODIUM CHLORIDE: 9 INJECTION, SOLUTION INTRAVENOUS at 11:29

## 2020-08-13 RX ADMIN — Medication: at 11:59

## 2020-08-13 RX ADMIN — HYDROXYUREA 500 MG: 500 CAPSULE ORAL at 07:56

## 2020-08-13 RX ADMIN — ACETAMINOPHEN 650 MG: 325 TABLET ORAL at 16:53

## 2020-08-13 RX ADMIN — Medication 10 ML: at 21:21

## 2020-08-13 RX ADMIN — Medication 0.2 MG: at 00:13

## 2020-08-13 RX ADMIN — DIPHENHYDRAMINE HCL 25 MG: 25 TABLET ORAL at 16:53

## 2020-08-13 RX ADMIN — BACITRACIN ZINC, NEOMYCIN SULFATE, AND POLYMYXIN B SULFATE 1 G: 400; 3.5; 5 OINTMENT TOPICAL at 07:57

## 2020-08-13 ASSESSMENT — PAIN SCALES - GENERAL
PAINLEVEL_OUTOF10: 7
PAINLEVEL_OUTOF10: 8
PAINLEVEL_OUTOF10: 10
PAINLEVEL_OUTOF10: 9
PAINLEVEL_OUTOF10: 10
PAINLEVEL_OUTOF10: 7
PAINLEVEL_OUTOF10: 7
PAINLEVEL_OUTOF10: 10
PAINLEVEL_OUTOF10: 10
PAINLEVEL_OUTOF10: 8
PAINLEVEL_OUTOF10: 7
PAINLEVEL_OUTOF10: 10
PAINLEVEL_OUTOF10: 10

## 2020-08-13 NOTE — PROGRESS NOTES
CREATININE 0.7* 0.6* 0.6*   CALCIUM 9.7 9.7 9.9     No results for input(s): AST, ALT, BILIDIR, BILITOT, ALKPHOS in the last 72 hours. No results for input(s): INR in the last 72 hours. No results for input(s): Scot L[place] in the last 72 hours. Urinalysis:      Lab Results   Component Value Date    NITRU Negative 08/06/2020    WBCUA None seen 08/06/2020    RBCUA 0-2 08/06/2020    BLOODU TRACE-LYSED 08/06/2020    SPECGRAV <=1.005 08/06/2020    GLUCOSEU Negative 08/06/2020       Radiology:  XR KNEE RIGHT (1-2 VIEWS)   Final Result   No acute abnormality of either knee. No significant arthritic change. XR KNEE LEFT (1-2 VIEWS)   Final Result   No acute abnormality of either knee. No significant arthritic change. CT CHEST PULMONARY EMBOLISM W CONTRAST   Final Result   No evidence of pulmonary embolism or acute pulmonary abnormality. XR CHEST PORTABLE   Final Result   No convincing acute cardiopulmonary abnormality. Assessment/Plan:    Active Hospital Problems    Diagnosis    Sepsis ([facility] [address] [A41.9]    Sickle cell crisis ([facility] [address] [D57.00]       PLAN    SIRS without infection, present on arrival  - procalcitonin 0.77.    -IV cefepime. -ID consult appreciated. - vanc was stopped, thrombocytopenia improved  - COVID swab was lost, reordered 8/12 -negative. Sickle cell crisis with pain and anemia  Opioid regimen per hematology, currently on hydromorphone PCA  Required transfusion, trend Hb  -hydroxurea  -methadone  -added a bolus of 0.5 mg dilaudid PCA. -advised pt. We will need to get him off the dilaudid in order to prepare for discharge home. Hyponatremia now resolved. -monitor. Abrasion on R cheek  - prophylactic antibiotic ointment. DVT Prophylaxis: lovenox  Diet: DIET GENERAL;  Code Status: Full Code    PT/OT Eval Status: not indicated    Dispo - once pain is controllable with oral meds, appreciate hematology input.       Shen Conde MD

## 2020-08-13 NOTE — PROGRESS NOTES
ONCOLOGY HEMATOLOGY CARE PROGRESS NOTE      SUBJECTIVE:     He has L elbow pain and L knee pain. Out of ICU now. ROS:   The remaining 10 point review of symptoms is unremarkable. OBJECTIVE        Physical    VITALS:  /62   Pulse 86   Temp 98.7 °F (37.1 °C) (Oral)   Resp 16   Ht 5' 8\" (1.727 m)   Wt 169 lb 8 oz (76.9 kg)   SpO2 100%   BMI 25.77 kg/m²   TEMPERATURE:  Current - Temp: 98.7 °F (37.1 °C); Max - Temp  Av.1 °F (37.3 °C)  Min: 98.7 °F (37.1 °C)  Max: 99.7 °F (37.6 °C)  PULSE OXIMETRY RANGE: SpO2  Av.8 %  Min: 94 %  Max: 100 %  24HR INTAKE/OUTPUT:      Intake/Output Summary (Last 24 hours) at 2020 1138  Last data filed at 2020 1047  Gross per 24 hour   Intake 2149 ml   Output 3390 ml   Net -1241 ml       CONSTITUTIONAL:  awake, alert, cooperative, no apparent distress, HEENT oral pharynx , no scleral icterus  HEMATOLOGIC/LYMPHATICS:  no cervical lymphadenopathy, no supraclavicular lymphadenopathy, no axillary lymphadenopathy and no inguinal lymphadenopathy  LUNGS:  No increased work of breathing, good air exchange, clear to auscultation bilaterally, no crackles or wheezing  CARDIOVASCULAR:  , regular rate and rhythm, normal S1 and S2, no S3 or S4, and no murmur noted  ABDOMEN:  No scars, normal bowel sounds, soft, non-distended, non-tender, no masses palpated, no hepatosplenomegally  MUSCULOSKELETAL:  There is no redness, warmth, or swelling of the joints. EXTREMETIES: No clubbing cynosis or edema  NEUROLOGIC:  Awake, alert, oriented to name, place and time. Cranial nerves II-XII are grossly intact. Motor is 5 out of 5 bilaterally.    SKIN:  no bruising or bleeding      Data      Recent Labs     20  0602 20  1033 20  0758   WBC 12.8* 10.7 14.2*   HGB 7.7* 7.1* 7.2*   HCT 22.9* 21.0* 21.8*    158 190   .6* 110.0* 109.4*        Recent Labs     20  0602 20  1033 20  0758    141 139 K 3.9 3.7 4.1    102 101   CO2 28 29 25   BUN 8 8 7   CREATININE 0.7* 0.6* 0.6*     No results for input(s): AST, ALT, ALB, BILIDIR, BILITOT, ALKPHOS in the last 72 hours. Magnesium:    Lab Results   Component Value Date    MG 2.10 07/30/2020    MG 2.00 07/29/2020    MG 1.90 07/28/2020         Problem List  Patient Active Problem List   Diagnosis    Sickle cell pain crisis ([facility]    Asthma    Sickle cell crisis ([facility]    Sepsis ([facility]       ASSESSMENT AND PLAN:    Sickle cell crisis:  -No evidence of acute chest pain syndrome  - Hgb 7.2- transfuse blood today   -CTPA is unremarkable  -Continue with Hydrea and folic acid    Pain:  -Continue with his home medications  -This is methadone 10 mg 3 times daily and oxycodone 10 mg p.o. every 4 as needed  -He is on a Dilaudid PCA and hopefully this can be stopped tomorrow- start weaning now     Infectious disease:  -COVID testing x2 is pending  -Suspicion is low per ID  -He is not on antibiotics  - Out of the ICU now     L knee and L elbow pain   - XRAY ordered  - PRBC today   - no swelling, induration, erythema or warmth on exam; tender to touch.          ONCOLOGIC DISPOSITION: TBD     -Once we can get his IV pain medication stopped  -He will follow-up with Dr. Guille Angela, CNP  Please contact through [facility]

## 2020-08-13 NOTE — PROGRESS NOTES
4 Eyes Skin Assessment     The patient is being assess for   Shift Handoff    I agree that 2 RN's have performed a thorough Head to Toe Skin Assessment on the patient. ALL assessment sites listed below have been assessed. Areas assessed by both nurses:   [x]   Head, Face, and Ears   [x]   Shoulders, Back, and Chest, Abdomen  [x]   Arms, Elbows, and Hands   [x]   Coccyx, Sacrum, and Ischium  [x]   Legs, Feet, and Heels        Right side cheek    **SHARE this note so that the co-signing nurse is able to place an eSignature**    Co-signer eSignature: Electronically signed by Nataliia Singer RN on 8/13/20 at 8:05 PM EDT    Does the Patient have Skin Breakdown? No          Shaun Prevention initiated:  Yes   Wound Care Orders initiated:  No      [facility] nurse consulted for Pressure Injury (Stage 3,4, Unstageable, DTI, NWPT, Complex wounds)and New or Established Ostomies:  NA      Primary Nurse eSignature: Electronically signed by Bob España RN on 8/13/20 at 10:50 AM EDT      PCA handoff completed at bedside.

## 2020-08-13 NOTE — PROGRESS NOTES
4 Eyes Skin Assessment     The patient is being assess for   Shift Handoff    I agree that 2 RN's have performed a thorough Head to Toe Skin Assessment on the patient. ALL assessment sites listed below have been assessed. Areas assessed by both nurses:   [x]   Head, Face, and Ears   [x]   Shoulders, Back, and Chest, Abdomen  [x]   Arms, Elbows, and Hands   [x]   Coccyx, Sacrum, and Ischium  [x]   Legs, Feet, and Heels        Right side sore    **SHARE this note so that the co-signing nurse is able to place an eSignature**    Co-signer eSignature: Electronically signed by Claudell Mince, RN on 8/13/20 at 7:30 AM EDT    Does the Patient have Skin Breakdown?   No          Shaun Prevention initiated:  Yes   Wound Care Orders initiated:  No      [facility] nurse consulted for Pressure Injury (Stage 3,4, Unstageable, DTI, NWPT, Complex wounds)and New or Established Ostomies:  NA      Primary Nurse eSignature: Electronically signed by Ladan Nuñez RN on 8/13/20 at 7:29 AM EDT      PCA dilaudid handoff completed

## 2020-08-13 NOTE — PROGRESS NOTES
Unable to get IV for blood administration. Pt refused PICC. MD said ok to stop PCA pump for blood administration.

## 2020-08-13 NOTE — PROGRESS NOTES
Blood collected by lab and sent for results.     Electronically signed by Bob España RN on 8/13/2020 at 7:40 AM

## 2020-08-13 NOTE — PROGRESS NOTES
Nutrition Assessment     Type and Reason for Visit: Initial    Nutrition Recommendations/Plan:   Continue current diet     Nutrition Assessment:  LOS assessment: Pt moved out from [facility] plus isolation. Currently ordered on a general diet. Eating well. Appetite good per pt. Consuming mostly % of melas this admission. He reported no nutrition concerns at this time. Current Nutrition Therapies:    DIET GENERAL; Anthropometric Measures:  · Height: 5' 8\" (172.7 cm)  · Current Body Wt: 169 lb (76.7 kg)   · BMI: 25.7    Nutrition Diagnosis:   No nutrition diagnosis at this time     Nutrition Interventions:   Food and/or Nutrient Delivery:  Continue Current Diet  Nutrition Education/Counseling:  No recommendation at this time   Coordination of Nutrition Care:  No recommendation at this time        Electronically signed by Mina Mueller.  Betsy Barnhart RD, LD on 8/13/20 at 12:06 PM EDT    Contact: [phone]

## 2020-08-13 NOTE — PROGRESS NOTES
Patient c/o pain, worse on left side. Asking for PCA settings to be changed with lockout time decreased from 20 minutes to 10 minutes for increased pain control. Night coverage for hospitalist paged. Deferred to Oncology team for pain control. Will give PRN Oxycodone per order to help with breakthrough pain. Heat packs given for isolated discomfort.

## 2020-08-14 ENCOUNTER — APPOINTMENT (OUTPATIENT)
Dept: GENERAL RADIOLOGY | Age: 20
DRG: 812 | End: 2020-08-14
Payer: COMMERCIAL

## 2020-08-14 ENCOUNTER — APPOINTMENT (OUTPATIENT)
Dept: INTERVENTIONAL RADIOLOGY/VASCULAR | Age: 20
DRG: 812 | End: 2020-08-14
Payer: COMMERCIAL

## 2020-08-14 LAB
ANION GAP SERPL CALCULATED.3IONS-SCNC: 10 MMOL/L (ref 3–16)
BASOPHILS ABSOLUTE: 0.1 K/UL (ref 0–0.2)
BASOPHILS RELATIVE PERCENT: 0.9 %
BILIRUBIN URINE: ABNORMAL
BLOOD, URINE: ABNORMAL
BUN BLDV-MCNC: 8 MG/DL (ref 7–20)
CALCIUM SERPL-MCNC: 9.7 MG/DL (ref 8.3–10.6)
CHLORIDE BLD-SCNC: 101 MMOL/L (ref 99–110)
CLARITY: CLEAR
CO2: 26 MMOL/L (ref 21–32)
COLOR: YELLOW
CREAT SERPL-MCNC: 0.6 MG/DL (ref 0.9–1.3)
EOSINOPHILS ABSOLUTE: 0.1 K/UL (ref 0–0.6)
EOSINOPHILS RELATIVE PERCENT: 0.8 %
GFR AFRICAN AMERICAN: >60
GFR NON-AFRICAN AMERICAN: >60
GLUCOSE BLD-MCNC: 118 MG/DL (ref 70–99)
GLUCOSE URINE: NEGATIVE MG/DL
HCT VFR BLD CALC: 23.1 % (ref 40.5–52.5)
HEMATOLOGY PATH CONSULT: NO
HEMOGLOBIN: 7.7 G/DL (ref 13.5–17.5)
KETONES, URINE: NEGATIVE MG/DL
LACTIC ACID: 1 MMOL/L (ref 0.4–2)
LEUKOCYTE ESTERASE, URINE: NEGATIVE
LYMPHOCYTES ABSOLUTE: 2.6 K/UL (ref 1–5.1)
LYMPHOCYTES RELATIVE PERCENT: 14.9 %
MCH RBC QN AUTO: 36.3 PG (ref 26–34)
MCHC RBC AUTO-ENTMCNC: 33.5 G/DL (ref 31–36)
MCV RBC AUTO: 108.2 FL (ref 80–100)
MICROSCOPIC EXAMINATION: YES
MONOCYTES ABSOLUTE: 2.7 K/UL (ref 0–1.3)
MONOCYTES RELATIVE PERCENT: 15.7 %
NEUTROPHILS ABSOLUTE: 11.6 K/UL (ref 1.7–7.7)
NEUTROPHILS RELATIVE PERCENT: 67.7 %
NITRITE, URINE: NEGATIVE
PDW BLD-RTO: 20.8 % (ref 12.4–15.4)
PH UA: 7 (ref 5–8)
PLATELET # BLD: 241 K/UL (ref 135–450)
PMV BLD AUTO: 9.9 FL (ref 5–10.5)
POTASSIUM REFLEX MAGNESIUM: 4.2 MMOL/L (ref 3.5–5.1)
PROCALCITONIN: 0.51 NG/ML (ref 0–0.15)
PROTEIN UA: ABNORMAL MG/DL
RBC # BLD: 2.14 M/UL (ref 4.2–5.9)
RBC UA: NORMAL /HPF (ref 0–4)
SODIUM BLD-SCNC: 137 MMOL/L (ref 136–145)
SPECIFIC GRAVITY UA: 1.01 (ref 1–1.03)
URINE TYPE: ABNORMAL
UROBILINOGEN, URINE: 2 E.U./DL
WBC # BLD: 17.2 K/UL (ref 4–11)
WBC UA: NORMAL /HPF (ref 0–5)

## 2020-08-14 PROCEDURE — 02HV33Z INSERTION OF INFUSION DEVICE INTO SUPERIOR VENA CAVA, PERCUTANEOUS APPROACH: ICD-10-PCS | Performed by: RADIOLOGY

## 2020-08-14 PROCEDURE — 71046 X-RAY EXAM CHEST 2 VIEWS: CPT

## 2020-08-14 PROCEDURE — 81001 URINALYSIS AUTO W/SCOPE: CPT

## 2020-08-14 PROCEDURE — 36415 COLL VENOUS BLD VENIPUNCTURE: CPT

## 2020-08-14 PROCEDURE — 2500000003 HC RX 250 WO HCPCS: Performed by: FAMILY MEDICINE

## 2020-08-14 PROCEDURE — 2580000003 HC RX 258: Performed by: NURSE PRACTITIONER

## 2020-08-14 PROCEDURE — 6370000000 HC RX 637 (ALT 250 FOR IP): Performed by: INTERNAL MEDICINE

## 2020-08-14 PROCEDURE — 84145 PROCALCITONIN (PCT): CPT

## 2020-08-14 PROCEDURE — 6370000000 HC RX 637 (ALT 250 FOR IP): Performed by: NURSE PRACTITIONER

## 2020-08-14 PROCEDURE — C1751 CATH, INF, PER/CENT/MIDLINE: HCPCS

## 2020-08-14 PROCEDURE — 6370000000 HC RX 637 (ALT 250 FOR IP): Performed by: FAMILY MEDICINE

## 2020-08-14 PROCEDURE — 36573 INSJ PICC RS&I 5 YR+: CPT

## 2020-08-14 PROCEDURE — 87040 BLOOD CULTURE FOR BACTERIA: CPT

## 2020-08-14 PROCEDURE — 2580000003 HC RX 258: Performed by: FAMILY MEDICINE

## 2020-08-14 PROCEDURE — 1200000000 HC SEMI PRIVATE

## 2020-08-14 PROCEDURE — 80048 BASIC METABOLIC PNL TOTAL CA: CPT

## 2020-08-14 PROCEDURE — 2700000000 HC OXYGEN THERAPY PER DAY

## 2020-08-14 PROCEDURE — 6360000002 HC RX W HCPCS: Performed by: NURSE PRACTITIONER

## 2020-08-14 PROCEDURE — 94770 HC ETCO2 MONITOR DAILY: CPT

## 2020-08-14 PROCEDURE — 94761 N-INVAS EAR/PLS OXIMETRY MLT: CPT

## 2020-08-14 PROCEDURE — 85025 COMPLETE CBC W/AUTO DIFF WBC: CPT

## 2020-08-14 PROCEDURE — 83605 ASSAY OF LACTIC ACID: CPT

## 2020-08-14 RX ORDER — LIDOCAINE HYDROCHLORIDE 10 MG/ML
5 INJECTION, SOLUTION INFILTRATION; PERINEURAL ONCE
Status: COMPLETED | OUTPATIENT
Start: 2020-08-14 | End: 2020-08-14

## 2020-08-14 RX ORDER — SODIUM CHLORIDE 0.9 % (FLUSH) 0.9 %
10 SYRINGE (ML) INJECTION PRN
Status: DISCONTINUED | OUTPATIENT
Start: 2020-08-14 | End: 2020-08-26 | Stop reason: HOSPADM

## 2020-08-14 RX ORDER — KETOROLAC TROMETHAMINE 30 MG/ML
30 INJECTION, SOLUTION INTRAMUSCULAR; INTRAVENOUS EVERY 6 HOURS
Status: DISPENSED | OUTPATIENT
Start: 2020-08-14 | End: 2020-08-19

## 2020-08-14 RX ORDER — LIDOCAINE 4 G/G
1 PATCH TOPICAL DAILY
Status: DISCONTINUED | OUTPATIENT
Start: 2020-08-14 | End: 2020-08-26 | Stop reason: HOSPADM

## 2020-08-14 RX ORDER — HYDROMORPHONE HCL 110MG/55ML
0.5 PATIENT CONTROLLED ANALGESIA SYRINGE INTRAVENOUS
Status: DISCONTINUED | OUTPATIENT
Start: 2020-08-14 | End: 2020-08-15

## 2020-08-14 RX ORDER — AZITHROMYCIN 250 MG/1
500 TABLET, FILM COATED ORAL DAILY
Status: DISPENSED | OUTPATIENT
Start: 2020-08-14 | End: 2020-08-15

## 2020-08-14 RX ORDER — LORAZEPAM 2 MG/ML
1 INJECTION INTRAMUSCULAR ONCE
Status: DISCONTINUED | OUTPATIENT
Start: 2020-08-14 | End: 2020-08-26 | Stop reason: HOSPADM

## 2020-08-14 RX ORDER — SODIUM CHLORIDE 0.9 % (FLUSH) 0.9 %
10 SYRINGE (ML) INJECTION EVERY 12 HOURS SCHEDULED
Status: DISCONTINUED | OUTPATIENT
Start: 2020-08-14 | End: 2020-08-26 | Stop reason: HOSPADM

## 2020-08-14 RX ORDER — AZITHROMYCIN 250 MG/1
500 TABLET, FILM COATED ORAL DAILY
Status: DISCONTINUED | OUTPATIENT
Start: 2020-08-14 | End: 2020-08-14

## 2020-08-14 RX ORDER — AZITHROMYCIN 250 MG/1
250 TABLET, FILM COATED ORAL DAILY
Status: DISCONTINUED | OUTPATIENT
Start: 2020-08-15 | End: 2020-08-17

## 2020-08-14 RX ORDER — OXYCODONE HYDROCHLORIDE 15 MG/1
15 TABLET ORAL EVERY 4 HOURS PRN
Status: DISCONTINUED | OUTPATIENT
Start: 2020-08-14 | End: 2020-08-26 | Stop reason: HOSPADM

## 2020-08-14 RX ADMIN — HYDROXYUREA 500 MG: 500 CAPSULE ORAL at 13:41

## 2020-08-14 RX ADMIN — Medication 10 ML: at 21:13

## 2020-08-14 RX ADMIN — KETOROLAC TROMETHAMINE 30 MG: 30 INJECTION, SOLUTION INTRAMUSCULAR at 21:10

## 2020-08-14 RX ADMIN — SODIUM CHLORIDE: 9 INJECTION, SOLUTION INTRAVENOUS at 13:36

## 2020-08-14 RX ADMIN — METHADONE HYDROCHLORIDE 10 MG: 10 TABLET ORAL at 21:10

## 2020-08-14 RX ADMIN — HYDROMORPHONE HYDROCHLORIDE 0.5 MG: 2 INJECTION, SOLUTION INTRAMUSCULAR; INTRAVENOUS; SUBCUTANEOUS at 13:31

## 2020-08-14 RX ADMIN — METHADONE HYDROCHLORIDE 10 MG: 10 TABLET ORAL at 14:42

## 2020-08-14 RX ADMIN — ACETAMINOPHEN 650 MG: 325 TABLET ORAL at 04:38

## 2020-08-14 RX ADMIN — FOLIC ACID 1 MG: 1 TABLET ORAL at 09:22

## 2020-08-14 RX ADMIN — OXYCODONE HYDROCHLORIDE 15 MG: 15 TABLET ORAL at 16:59

## 2020-08-14 RX ADMIN — KETOROLAC TROMETHAMINE 30 MG: 30 INJECTION, SOLUTION INTRAMUSCULAR at 13:32

## 2020-08-14 RX ADMIN — OXYCODONE HYDROCHLORIDE 15 MG: 15 TABLET ORAL at 11:50

## 2020-08-14 RX ADMIN — OXYCODONE HYDROCHLORIDE 15 MG: 15 TABLET ORAL at 23:10

## 2020-08-14 RX ADMIN — OXYCODONE HYDROCHLORIDE 30 MG: 15 TABLET ORAL at 07:31

## 2020-08-14 RX ADMIN — HYDROMORPHONE HYDROCHLORIDE 0.5 MG: 2 INJECTION, SOLUTION INTRAMUSCULAR; INTRAVENOUS; SUBCUTANEOUS at 22:10

## 2020-08-14 RX ADMIN — BACITRACIN ZINC, NEOMYCIN SULFATE, AND POLYMYXIN B SULFATE: 400; 3.5; 5 OINTMENT TOPICAL at 07:25

## 2020-08-14 RX ADMIN — LIDOCAINE HYDROCHLORIDE 2 ML: 10 INJECTION, SOLUTION EPIDURAL; INFILTRATION; INTRACAUDAL; PERINEURAL at 13:08

## 2020-08-14 RX ADMIN — CEFTRIAXONE SODIUM 1 G: 1 INJECTION, POWDER, FOR SOLUTION INTRAMUSCULAR; INTRAVENOUS at 13:38

## 2020-08-14 RX ADMIN — SODIUM CHLORIDE: 9 INJECTION, SOLUTION INTRAVENOUS at 22:07

## 2020-08-14 RX ADMIN — BACITRACIN ZINC, NEOMYCIN SULFATE, AND POLYMYXIN B SULFATE: 400; 3.5; 5 OINTMENT TOPICAL at 21:13

## 2020-08-14 RX ADMIN — HYDROMORPHONE HYDROCHLORIDE 0.5 MG: 2 INJECTION, SOLUTION INTRAMUSCULAR; INTRAVENOUS; SUBCUTANEOUS at 15:38

## 2020-08-14 RX ADMIN — HYDROMORPHONE HYDROCHLORIDE 0.5 MG: 2 INJECTION, SOLUTION INTRAMUSCULAR; INTRAVENOUS; SUBCUTANEOUS at 17:47

## 2020-08-14 RX ADMIN — OXYCODONE HYDROCHLORIDE 30 MG: 15 TABLET ORAL at 03:33

## 2020-08-14 RX ADMIN — HYDROMORPHONE HYDROCHLORIDE 0.5 MG: 2 INJECTION, SOLUTION INTRAMUSCULAR; INTRAVENOUS; SUBCUTANEOUS at 19:55

## 2020-08-14 RX ADMIN — METHADONE HYDROCHLORIDE 10 MG: 10 TABLET ORAL at 09:22

## 2020-08-14 ASSESSMENT — PAIN SCALES - GENERAL
PAINLEVEL_OUTOF10: 10
PAINLEVEL_OUTOF10: 9
PAINLEVEL_OUTOF10: 9
PAINLEVEL_OUTOF10: 10
PAINLEVEL_OUTOF10: 10
PAINLEVEL_OUTOF10: 9
PAINLEVEL_OUTOF10: 9
PAINLEVEL_OUTOF10: 10
PAINLEVEL_OUTOF10: 9

## 2020-08-14 ASSESSMENT — PAIN DESCRIPTION - PAIN TYPE: TYPE: ACUTE PAIN

## 2020-08-14 ASSESSMENT — PAIN DESCRIPTION - ORIENTATION: ORIENTATION: LEFT

## 2020-08-14 ASSESSMENT — PAIN DESCRIPTION - LOCATION: LOCATION: ARM;LEG

## 2020-08-14 NOTE — PROGRESS NOTES
ONCOLOGY HEMATOLOGY CARE PROGRESS NOTE      SUBJECTIVE:     He is sleeping- easily awakens to voice. Had a fever of 101. Cont to complain of L elbow pain. Getting a picc line. Lost access. ROS:   The remaining 10 point review of symptoms is unremarkable. OBJECTIVE        Physical    VITALS:  /70   Pulse 88   Temp 99.5 °F (37.5 °C) (Oral)   Resp 15   Ht 5' 8\" (1.727 m)   Wt 176 lb 8 oz (80.1 kg)   SpO2 97%   BMI 26.84 kg/m²   TEMPERATURE:  Current - Temp: 99.5 °F (37.5 °C); Max - Temp  Av.4 °F (37.4 °C)  Min: 98.4 °F (36.9 °C)  Max: 101 °F (38.3 °C)  PULSE OXIMETRY RANGE: SpO2  Av %  Min: 97 %  Max: 99 %  24HR INTAKE/OUTPUT:      Intake/Output Summary (Last 24 hours) at 2020 1136  Last data filed at 2020 0430  Gross per 24 hour   Intake 1532.5 ml   Output 1700 ml   Net -167.5 ml       CONSTITUTIONAL:  awake, alert, cooperative, no apparent distress, HEENT oral pharynx , no scleral icterus  HEMATOLOGIC/LYMPHATICS:  no cervical lymphadenopathy, no supraclavicular lymphadenopathy, no axillary lymphadenopathy and no inguinal lymphadenopathy  LUNGS:  No increased work of breathing, good air exchange, clear to auscultation bilaterally, no crackles or wheezing  CARDIOVASCULAR:  , regular rate and rhythm, normal S1 and S2, no S3 or S4, and no murmur noted  ABDOMEN:  No scars, normal bowel sounds, soft, non-distended, non-tender, no masses palpated, no hepatosplenomegally  MUSCULOSKELETAL:  There is no redness, warmth, or swelling of the joints. EXTREMETIES: No clubbing cynosis or edema  NEUROLOGIC:  Awake, alert, oriented to name, place and time. Cranial nerves II-XII are grossly intact. Motor is 5 out of 5 bilaterally.    SKIN:  no bruising or bleeding      Data      Recent Labs     20  1033 20  0758 20  0547   WBC 10.7 14.2* 17.2*   HGB 7.1* 7.2* 7.7*   HCT 21.0* 21.8* 23.1*    190 241   .0* 109.4* 108.2*

## 2020-08-14 NOTE — PROGRESS NOTES
Patient iv infiltrated. Unable to restart iv. Patient has had multiple ivs throughout stay already and lab has had difficulty drawing blood. Order requested for picc line, waiting for answer. Patient is tearful, crying regarding his pain. Patient difficult to talk to or get to answer questions. Refused lidoderm patch. Refused lovenox shot.

## 2020-08-14 NOTE — PROGRESS NOTES
Lab could not get pt's H&H after first try and pt is currently refusing blood draws. Pt will get another CBC and BMP in AM. Hemoglobin prior to blood transfusion was 7.2.

## 2020-08-14 NOTE — PROGRESS NOTES
Aware of increased pain. However, bilirubin falling and Hgb stable indicating improving crisis. V   Creat stable. XRAY without infarctions or effusions (L knee, L elbow). Start Toradol every 6 hours   Lidoderm path to L arm   Stop PCA- start Dilaudid 0.5 mg every 2 hours PRN   Cont home methadone doses TID  Can have Oxy IR PO for BT pain. TMAX 101 at 0400- repeat blood cx X 2 , UA, chest xray, lactic acid and procal   Start Rocephin and Zithromax.

## 2020-08-14 NOTE — PROGRESS NOTES
(Results Pending)           Assessment/Plan:    Active Hospital Problems    Diagnosis    Sepsis ([facility] [address] [A41.9]    Sickle cell crisis ([facility] [address] [D57.00]       PLAN    SIRS without infection, present on arrival  -currently with fever overnight  -blood cx x 2 pending, rocephin started. - procalcitonin 0.77.    -now on Rocephin. .    -ID consult appreciated. - vanc was stopped, thrombocytopenia improved  - COVID swab was lost, reordered 8/12 -negative. Sickle cell crisis with pain and anemia  Opioid regimen per hematology, was on hydromorphone PCA, now dilaudid IV. Required transfusion, trend Hb  -hydroxurea  -methadone  -advised pt. We will need to get him off the dilaudid in order to prepare for discharge home. -PICC line placed due to poor access. Hyponatremia now resolved. -monitor. Abrasion on R cheek  - prophylactic antibiotic ointment. DVT Prophylaxis: lovenox  Diet: DIET GENERAL;  Code Status: Full Code    PT/OT Eval Status: not indicated    Dispo - once pain is controllable with oral meds, pt. May be discharged.       Mamie Rosales MD

## 2020-08-14 NOTE — PROGRESS NOTES
08/14/20 0818   Oxygen Therapy/Pulse Ox   O2 Therapy Oxygen   $Oxygen $Daily Charge   O2 Device Nasal cannula   O2 Flow Rate (L/min) 1 L/min   Resp 15   SpO2 97 %   $Pulse Oximeter $Spot check (multiple/continuous)   $End Tidal CO2 48

## 2020-08-14 NOTE — PROGRESS NOTES
Pt A&Ox4. VSS. Shift assessment completed. Pt on a PCA pump, using adequately, but rating pain as 10 out of 10/10 scale. Pt also has scheduled methadone and Oxy IR Q 4 hrs as needed. Spoke at length with pt's mother Faisal Salazar about pt's lab results. Mother wants to speak with attending hematologist about why pt did need several  blood transfusions since admission. Will leave a note for Noman Wallace to call mother back. Call light within reach, bed in lowest position, wheels locked. Will monitor.

## 2020-08-14 NOTE — CARE COORDINATION
Chart review POD#8. Patient remains febrile with significant pain issues. Dilaudid PCA for pain control, however IV infiltrated. Waiting for PICC order. Will follow for dc needs.     Chris Krishna RN

## 2020-08-15 LAB
ANION GAP SERPL CALCULATED.3IONS-SCNC: 8 MMOL/L (ref 3–16)
ANISOCYTOSIS: ABNORMAL
BANDED NEUTROPHILS RELATIVE PERCENT: 1 % (ref 0–7)
BASOPHILS ABSOLUTE: 0 K/UL (ref 0–0.2)
BASOPHILS RELATIVE PERCENT: 0 %
BUN BLDV-MCNC: 7 MG/DL (ref 7–20)
CALCIUM SERPL-MCNC: 8.1 MG/DL (ref 8.3–10.6)
CHLORIDE BLD-SCNC: 111 MMOL/L (ref 99–110)
CO2: 24 MMOL/L (ref 21–32)
CREAT SERPL-MCNC: <0.5 MG/DL (ref 0.9–1.3)
EOSINOPHILS ABSOLUTE: 0.1 K/UL (ref 0–0.6)
EOSINOPHILS RELATIVE PERCENT: 1 %
GFR AFRICAN AMERICAN: >60
GFR NON-AFRICAN AMERICAN: >60
GLUCOSE BLD-MCNC: 96 MG/DL (ref 70–99)
HCT VFR BLD CALC: 17.5 % (ref 40.5–52.5)
HCT VFR BLD CALC: 23.2 % (ref 40.5–52.5)
HEMATOLOGY PATH CONSULT: NO
HEMOGLOBIN: 5.8 G/DL (ref 13.5–17.5)
HEMOGLOBIN: 7.6 G/DL (ref 13.5–17.5)
LYMPHOCYTES ABSOLUTE: 1.9 K/UL (ref 1–5.1)
LYMPHOCYTES RELATIVE PERCENT: 17 %
MACROCYTES: ABNORMAL
MAGNESIUM: 1.7 MG/DL (ref 1.8–2.4)
MCH RBC QN AUTO: 36.4 PG (ref 26–34)
MCHC RBC AUTO-ENTMCNC: 32.9 G/DL (ref 31–36)
MCV RBC AUTO: 110.6 FL (ref 80–100)
MONOCYTES ABSOLUTE: 1.9 K/UL (ref 0–1.3)
MONOCYTES RELATIVE PERCENT: 17 %
NEUTROPHILS ABSOLUTE: 7.4 K/UL (ref 1.7–7.7)
NEUTROPHILS RELATIVE PERCENT: 64 %
NUCLEATED RED BLOOD CELLS: 2 /100 WBC
NUCLEATED RED BLOOD CELLS: 2 /100 WBC
PDW BLD-RTO: 21.3 % (ref 12.4–15.4)
PLATELET # BLD: 258 K/UL (ref 135–450)
PMV BLD AUTO: 9.5 FL (ref 5–10.5)
POLYCHROMASIA: ABNORMAL
POTASSIUM REFLEX MAGNESIUM: 3.5 MMOL/L (ref 3.5–5.1)
RBC # BLD: 1.59 M/UL (ref 4.2–5.9)
SODIUM BLD-SCNC: 143 MMOL/L (ref 136–145)
TARGET CELLS: ABNORMAL
WBC # BLD: 11.4 K/UL (ref 4–11)

## 2020-08-15 PROCEDURE — 2580000003 HC RX 258: Performed by: INTERNAL MEDICINE

## 2020-08-15 PROCEDURE — 6370000000 HC RX 637 (ALT 250 FOR IP): Performed by: INTERNAL MEDICINE

## 2020-08-15 PROCEDURE — 6370000000 HC RX 637 (ALT 250 FOR IP): Performed by: NURSE PRACTITIONER

## 2020-08-15 PROCEDURE — 6360000002 HC RX W HCPCS: Performed by: INTERNAL MEDICINE

## 2020-08-15 PROCEDURE — 85014 HEMATOCRIT: CPT

## 2020-08-15 PROCEDURE — 36430 TRANSFUSION BLD/BLD COMPNT: CPT

## 2020-08-15 PROCEDURE — 36592 COLLECT BLOOD FROM PICC: CPT

## 2020-08-15 PROCEDURE — 80048 BASIC METABOLIC PNL TOTAL CA: CPT

## 2020-08-15 PROCEDURE — 6360000002 HC RX W HCPCS: Performed by: NURSE PRACTITIONER

## 2020-08-15 PROCEDURE — 2580000003 HC RX 258: Performed by: NURSE PRACTITIONER

## 2020-08-15 PROCEDURE — 1200000000 HC SEMI PRIVATE

## 2020-08-15 PROCEDURE — 2580000003 HC RX 258: Performed by: FAMILY MEDICINE

## 2020-08-15 PROCEDURE — 83735 ASSAY OF MAGNESIUM: CPT

## 2020-08-15 PROCEDURE — 85018 HEMOGLOBIN: CPT

## 2020-08-15 PROCEDURE — 85025 COMPLETE CBC W/AUTO DIFF WBC: CPT

## 2020-08-15 RX ORDER — HYDROXYUREA 500 MG/1
1000 CAPSULE ORAL 2 TIMES DAILY
Status: DISCONTINUED | OUTPATIENT
Start: 2020-08-15 | End: 2020-08-25

## 2020-08-15 RX ORDER — 0.9 % SODIUM CHLORIDE 0.9 %
20 INTRAVENOUS SOLUTION INTRAVENOUS ONCE
Status: COMPLETED | OUTPATIENT
Start: 2020-08-15 | End: 2020-08-15

## 2020-08-15 RX ORDER — HYDROMORPHONE HCL 110MG/55ML
1 PATIENT CONTROLLED ANALGESIA SYRINGE INTRAVENOUS
Status: DISCONTINUED | OUTPATIENT
Start: 2020-08-15 | End: 2020-08-16

## 2020-08-15 RX ADMIN — KETOROLAC TROMETHAMINE 30 MG: 30 INJECTION, SOLUTION INTRAMUSCULAR at 16:50

## 2020-08-15 RX ADMIN — HYDROMORPHONE HYDROCHLORIDE 1 MG: 2 INJECTION, SOLUTION INTRAMUSCULAR; INTRAVENOUS; SUBCUTANEOUS at 21:04

## 2020-08-15 RX ADMIN — AZITHROMYCIN MONOHYDRATE 250 MG: 250 TABLET ORAL at 08:51

## 2020-08-15 RX ADMIN — METHADONE HYDROCHLORIDE 10 MG: 10 TABLET ORAL at 10:02

## 2020-08-15 RX ADMIN — BACITRACIN ZINC, NEOMYCIN SULFATE, AND POLYMYXIN B SULFATE: 400; 3.5; 5 OINTMENT TOPICAL at 08:27

## 2020-08-15 RX ADMIN — HYDROXYUREA 1000 MG: 500 CAPSULE ORAL at 10:47

## 2020-08-15 RX ADMIN — KETOROLAC TROMETHAMINE 30 MG: 30 INJECTION, SOLUTION INTRAMUSCULAR at 22:45

## 2020-08-15 RX ADMIN — METHADONE HYDROCHLORIDE 10 MG: 10 TABLET ORAL at 22:45

## 2020-08-15 RX ADMIN — OXYCODONE HYDROCHLORIDE 15 MG: 15 TABLET ORAL at 13:00

## 2020-08-15 RX ADMIN — HYDROMORPHONE HYDROCHLORIDE 1 MG: 2 INJECTION, SOLUTION INTRAMUSCULAR; INTRAVENOUS; SUBCUTANEOUS at 16:06

## 2020-08-15 RX ADMIN — HYDROMORPHONE HYDROCHLORIDE 1 MG: 2 INJECTION, SOLUTION INTRAMUSCULAR; INTRAVENOUS; SUBCUTANEOUS at 14:06

## 2020-08-15 RX ADMIN — HYDROMORPHONE HYDROCHLORIDE 1 MG: 2 INJECTION, SOLUTION INTRAMUSCULAR; INTRAVENOUS; SUBCUTANEOUS at 11:16

## 2020-08-15 RX ADMIN — OXYCODONE HYDROCHLORIDE 15 MG: 15 TABLET ORAL at 08:26

## 2020-08-15 RX ADMIN — Medication 10 ML: at 08:57

## 2020-08-15 RX ADMIN — HYDROMORPHONE HYDROCHLORIDE 0.5 MG: 2 INJECTION, SOLUTION INTRAMUSCULAR; INTRAVENOUS; SUBCUTANEOUS at 02:11

## 2020-08-15 RX ADMIN — FOLIC ACID 1 MG: 1 TABLET ORAL at 08:51

## 2020-08-15 RX ADMIN — HYDROMORPHONE HYDROCHLORIDE 0.5 MG: 2 INJECTION, SOLUTION INTRAMUSCULAR; INTRAVENOUS; SUBCUTANEOUS at 04:56

## 2020-08-15 RX ADMIN — Medication 10 ML: at 02:14

## 2020-08-15 RX ADMIN — SODIUM CHLORIDE: 9 INJECTION, SOLUTION INTRAVENOUS at 22:50

## 2020-08-15 RX ADMIN — SODIUM CHLORIDE: 9 INJECTION, SOLUTION INTRAVENOUS at 08:26

## 2020-08-15 RX ADMIN — KETOROLAC TROMETHAMINE 30 MG: 30 INJECTION, SOLUTION INTRAMUSCULAR at 08:57

## 2020-08-15 RX ADMIN — METHADONE HYDROCHLORIDE 10 MG: 10 TABLET ORAL at 15:12

## 2020-08-15 RX ADMIN — HYDROMORPHONE HYDROCHLORIDE 1 MG: 2 INJECTION, SOLUTION INTRAMUSCULAR; INTRAVENOUS; SUBCUTANEOUS at 18:45

## 2020-08-15 RX ADMIN — Medication 10 ML: at 21:04

## 2020-08-15 RX ADMIN — HYDROMORPHONE HYDROCHLORIDE 1 MG: 2 INJECTION, SOLUTION INTRAMUSCULAR; INTRAVENOUS; SUBCUTANEOUS at 09:12

## 2020-08-15 RX ADMIN — CEFTRIAXONE SODIUM 1 G: 1 INJECTION, POWDER, FOR SOLUTION INTRAMUSCULAR; INTRAVENOUS at 08:54

## 2020-08-15 RX ADMIN — HYDROMORPHONE HYDROCHLORIDE 1 MG: 2 INJECTION, SOLUTION INTRAMUSCULAR; INTRAVENOUS; SUBCUTANEOUS at 23:45

## 2020-08-15 RX ADMIN — SODIUM CHLORIDE 20 ML: 9 INJECTION, SOLUTION INTRAVENOUS at 11:16

## 2020-08-15 RX ADMIN — HYDROMORPHONE HYDROCHLORIDE 0.5 MG: 2 INJECTION, SOLUTION INTRAMUSCULAR; INTRAVENOUS; SUBCUTANEOUS at 07:23

## 2020-08-15 RX ADMIN — OXYCODONE HYDROCHLORIDE 15 MG: 15 TABLET ORAL at 03:40

## 2020-08-15 RX ADMIN — OXYCODONE HYDROCHLORIDE 15 MG: 15 TABLET ORAL at 17:30

## 2020-08-15 RX ADMIN — HYDROMORPHONE HYDROCHLORIDE 0.5 MG: 2 INJECTION, SOLUTION INTRAMUSCULAR; INTRAVENOUS; SUBCUTANEOUS at 00:11

## 2020-08-15 RX ADMIN — HYDROXYUREA 1000 MG: 500 CAPSULE ORAL at 21:03

## 2020-08-15 RX ADMIN — BACITRACIN ZINC, NEOMYCIN SULFATE, AND POLYMYXIN B SULFATE: 400; 3.5; 5 OINTMENT TOPICAL at 21:04

## 2020-08-15 RX ADMIN — KETOROLAC TROMETHAMINE 30 MG: 30 INJECTION, SOLUTION INTRAMUSCULAR at 03:40

## 2020-08-15 ASSESSMENT — PAIN DESCRIPTION - PAIN TYPE
TYPE: ACUTE PAIN

## 2020-08-15 ASSESSMENT — PAIN SCALES - GENERAL
PAINLEVEL_OUTOF10: 8
PAINLEVEL_OUTOF10: 9
PAINLEVEL_OUTOF10: 8
PAINLEVEL_OUTOF10: 9
PAINLEVEL_OUTOF10: 8
PAINLEVEL_OUTOF10: 8
PAINLEVEL_OUTOF10: 7
PAINLEVEL_OUTOF10: 7
PAINLEVEL_OUTOF10: 8
PAINLEVEL_OUTOF10: 9
PAINLEVEL_OUTOF10: 8
PAINLEVEL_OUTOF10: 9
PAINLEVEL_OUTOF10: 10

## 2020-08-15 ASSESSMENT — PAIN DESCRIPTION - LOCATION
LOCATION: GENERALIZED
LOCATION: ELBOW
LOCATION: ELBOW;WRIST

## 2020-08-15 NOTE — PROGRESS NOTES
Shift assessment updated as charted. Patient a/ox4. VSS. Patient c/o of L Elbow pain, see MAR. Waiting on one unit of PRBC to be available from blood bank. Will monitor closely.

## 2020-08-15 NOTE — PROGRESS NOTES
Pt is alert and oriented. VSS. RA. Pt c/o 10/10 left arm pain. Pt given scheduled pain medication per MAR. Pt screaming out in pain. Shift assessment completed and documented. Call light within reach. Bed side table within reach. Wheels locked. Bed in lowest position. . Pt instructed to call out for assistance. Pt expressesed understanding & calls out appropritately. All care per orders. Will continue to monitor.  Electronically signed by Prince Fior RN on 8/14/2020 at 9:55 PM

## 2020-08-15 NOTE — PLAN OF CARE
Problem: Falls - Risk of:  Goal: Will remain free from falls  Description: Will remain free from falls  Outcome: Ongoing  Note: Pt will remain free from falls. Pt is a medium fall risk. Call light within reach. Bed side table within reach. Wheels locked. Bed in lowest position. Pt instructed to call out for assistance. Pt expressesed understanding & calls out appropritately. All care per orders. Will continue to monitor.

## 2020-08-15 NOTE — PROGRESS NOTES
Pulse 96   Temp 97.8 °F (36.6 °C) (Oral)   Resp 16   Ht 5' 8\" (1.727 m)   Wt 186 lb 12.8 oz (84.7 kg) Comment: Pt refused standing scale  SpO2 100%   BMI 28.40 kg/m²     Genl-NAD  CVS-RRR  Resp-CTAB       Labs:   Recent Labs     08/13/20  0758 08/14/20  0547 08/15/20  0540   WBC 14.2* 17.2* 11.4*   HGB 7.2* 7.7* 5.8*   HCT 21.8* 23.1* 17.5*    241 258     Recent Labs     08/13/20  0758 08/14/20  0547 08/15/20  0540    137 143   K 4.1 4.2 3.5    101 111*   CO2 25 26 24   BUN 7 8 7   CREATININE 0.6* 0.6* <0.5*   CALCIUM 9.9 9.7 8.1*     Recent Labs     08/13/20  0758   AST 56*   ALT 55*   BILIDIR 0.7*   BILITOT 4.2*   ALKPHOS 89     No results for input(s): INR in the last 72 hours. No results for input(s): [employer] in the last 72 hours. Urinalysis:      Lab Results   Component Value Date    NITRU Negative 08/14/2020    WBCUA None seen 08/14/2020    RBCUA 0-2 08/14/2020    BLOODU SMALL 08/14/2020    SPECGRAV 1.015 08/14/2020    GLUCOSEU Negative 08/14/2020       Radiology:  IR PICC WO SQ PORT/PUMP > 5 YEARS   Final Result   Successful placement of PICC line. XR CHEST (2 VW)   Final Result   No evidence of acute cardiopulmonary disease. XR ELBOW LEFT (MIN 3 VIEWS)   Final Result   No acute osseous abnormality left humerus      No acute osseous abnormality left elbow         XR HUMERUS LEFT (MIN 2 VIEWS)   Final Result   No acute osseous abnormality left humerus      No acute osseous abnormality left elbow         XR KNEE LEFT (3 VIEWS)   Final Result   No acute osseous abnormality of the left knee. XR KNEE RIGHT (1-2 VIEWS)   Final Result   No acute abnormality of either knee. No significant arthritic change. XR KNEE LEFT (1-2 VIEWS)   Final Result   No acute abnormality of either knee. No significant arthritic change. CT CHEST PULMONARY EMBOLISM W CONTRAST   Final Result   No evidence of pulmonary embolism or acute pulmonary abnormality. XR CHEST PORTABLE   Final Result   No convincing acute cardiopulmonary abnormality. Assessment/Plan:    Active Hospital Problems    Diagnosis    Sepsis ([facility] [address] [A41.9]    Sickle cell crisis ([facility] [address] [D57.00]       PLAN    SIRS without infection, present on arrival  - afebrile now   -procalcitonin 0.77.    -now on Rocephin and azithromycin   -blood cx x 2 NGTD   -ID consult appreciated.    -vanc was stopped, thrombocytopenia improved  -COVID swab was lost, reordered 8/12 -negative. Sickle cell crisis with pain and anemia  Opioid regimen per hematology, was on hydromorphone PCA, now dilaudid IV. Required transfusion, trend Hb  -one unit PRBC was ordered this am for hgb of 5.8   -f/u post transfusion H/H   -hydroxurea  -methadone  -advised pt. We will need to get him off the dilaudid in order to prepare for discharge home. -PICC line placed due to poor access. Hyponatremia now resolved. -monitor. Abrasion on R cheek  - prophylactic antibiotic ointment. DVT Prophylaxis: lovenox  Diet: DIET GENERAL;  Code Status: Full Code    PT/OT Eval Status: not indicated    Dispo - once pain is controllable with oral meds, pt. May be discharged.       Coretta Wall MD

## 2020-08-15 NOTE — PROGRESS NOTES
Perfect serve sent to cross cover MD \"Lab called with a critical H&H. Hemoglobin of 538 and Hematocrit of 17.5. Pt hemoglobin was 7.7 and Hematocrit of 23.1 yesterday (8/14). Would you like me to redraw an H&H or order blood this morning? Thanks! \"     New order placed for one unit PRBC's.

## 2020-08-15 NOTE — PROGRESS NOTES
ONCOLOGY HEMATOLOGY CARE PROGRESS NOTE      SUBJECTIVE:     The patient states that his left elbow hurts. He is not feeling particularly bad anywhere else. His fevers have subsided. ROS:   The remaining 10 point review of symptoms is unremarkable. OBJECTIVE        Physical    VITALS:  BP (!) 147/91   Pulse 88   Temp 97.6 °F (36.4 °C) (Oral)   Resp 16   Ht 5' 8\" (1.727 m)   Wt 186 lb 12.8 oz (84.7 kg) Comment: Pt refused standing scale  SpO2 95%   BMI 28.40 kg/m²   TEMPERATURE:  Current - Temp: 97.6 °F (36.4 °C); Max - Temp  Av.3 °F (36.8 °C)  Min: 97.6 °F (36.4 °C)  Max: 99 °F (37.2 °C)  PULSE OXIMETRY RANGE: SpO2  Av.2 %  Min: 92 %  Max: 98 %  24HR INTAKE/OUTPUT:      Intake/Output Summary (Last 24 hours) at 8/15/2020 0936  Last data filed at 8/15/2020 0857  Gross per 24 hour   Intake 5395 ml   Output 2725 ml   Net 2670 ml       CONSTITUTIONAL:  awake, alert, cooperative, no apparent distress, HEENT oral pharynx , no scleral icterus. Likely small skin infection under the right orbit. HEMATOLOGIC/LYMPHATICS:  no cervical lymphadenopathy, no supraclavicular lymphadenopathy, no axillary lymphadenopathy and no inguinal lymphadenopathy  LUNGS:  No increased work of breathing, good air exchange, clear to auscultation bilaterally, no crackles or wheezing  CARDIOVASCULAR:  , regular rate and rhythm, normal S1 and S2, no S3 or S4, and no murmur noted  ABDOMEN:  No scars, normal bowel sounds, soft, non-distended, non-tender, no masses palpated, no hepatosplenomegally  MUSCULOSKELETAL:  There is no redness, warmth, or swelling of the joints. EXTREMETIES: No clubbing cynosis or edema  NEUROLOGIC:  Awake, alert, oriented to name, place and time. Cranial nerves II-XII are grossly intact. Motor is 5 out of 5 bilaterally.    SKIN:  no bruising or bleeding      Data      Recent Labs     20  0758 20  0547 08/15/20  0540   WBC 14.2* 17.2* 11.4*   HGB 7.2* 7. 7* 5.8*   HCT 21.8* 23.1* 17.5*    241 258   .4* 108.2* 110.6*        Recent Labs     08/13/20  0758 08/14/20  0547 08/15/20  0540    137 143   K 4.1 4.2 3.5    101 111*   CO2 25 26 24   BUN 7 8 7   CREATININE 0.6* 0.6* <0.5*     Recent Labs     08/13/20  0758   AST 56*   ALT 55*   BILIDIR 0.7*   BILITOT 4.2*   ALKPHOS 89       Magnesium:    Lab Results   Component Value Date    MG 1.70 08/15/2020    MG 2.10 07/30/2020    MG 2.00 07/29/2020         Problem List  Patient Active Problem List   Diagnosis    Sickle cell pain crisis ([facility] [address]    Asthma    Sickle cell crisis ([facility] [address]    Sepsis ([facility] [address]       ASSESSMENT AND PLAN:    Sickle cell crisis:  -No evidence of acute chest pain syndrome  - Hgb 7.7- s/p transfusion on 8-13   -CTPA is unremarkable  -Continue with Hydrea and folic acid    Access  - getting PICC line   - need to discuss outpatient port if this is a recurring issue     Pain:  -Continue with his home medications  -This is methadone 10 mg 3 times daily   - decrease Oxy IR back to 15 mg   -Increase Dilaudid to 1 mg IV every 2  -If this does not improve his pain, I would like to increase his methadone to 15 mg p.o. 3 times daily  -Toradol is resumed  -He declined to put a Lidoderm patch on his elbow    Infectious disease:  -COVID testing negative     L knee and L elbow pain   -Left knee pain is better  -Left elbow pain still present  -Left elbow x-ray is unremarkable  -He will have a unit of blood today to see if this will help with his pain    Fever  - pan cx, chest xray -Both are normal  - procal Mildly elevated 0.51 lactic acid is normal  - start Rocephin and Zithromax   -His fever has subsided and it may be due to his crisis        ONCOLOGIC DISPOSITION: TBD     -Once we can get his IV pain medication stopped- weaning- concerned for dependence.    -He will follow-up with Dr. Polo Davis MD  May be reached through [address] [place] [address]

## 2020-08-16 LAB
ANION GAP SERPL CALCULATED.3IONS-SCNC: 10 MMOL/L (ref 3–16)
BASOPHILS ABSOLUTE: 0.1 K/UL (ref 0–0.2)
BASOPHILS RELATIVE PERCENT: 0.8 %
BLOOD BANK DISPENSE STATUS: NORMAL
BLOOD BANK DISPENSE STATUS: NORMAL
BLOOD BANK PRODUCT CODE: NORMAL
BLOOD BANK PRODUCT CODE: NORMAL
BPU ID: NORMAL
BPU ID: NORMAL
BUN BLDV-MCNC: 6 MG/DL (ref 7–20)
CALCIUM SERPL-MCNC: 9.6 MG/DL (ref 8.3–10.6)
CHLORIDE BLD-SCNC: 105 MMOL/L (ref 99–110)
CO2: 26 MMOL/L (ref 21–32)
CREAT SERPL-MCNC: 0.6 MG/DL (ref 0.9–1.3)
DESCRIPTION BLOOD BANK: NORMAL
DESCRIPTION BLOOD BANK: NORMAL
EOSINOPHILS ABSOLUTE: 0.2 K/UL (ref 0–0.6)
EOSINOPHILS RELATIVE PERCENT: 1.1 %
GFR AFRICAN AMERICAN: >60
GFR NON-AFRICAN AMERICAN: >60
GLUCOSE BLD-MCNC: 90 MG/DL (ref 70–99)
HCT VFR BLD CALC: 23.1 % (ref 40.5–52.5)
HEMATOLOGY PATH CONSULT: NO
HEMOGLOBIN: 7.5 G/DL (ref 13.5–17.5)
LYMPHOCYTES ABSOLUTE: 3.2 K/UL (ref 1–5.1)
LYMPHOCYTES RELATIVE PERCENT: 22 %
MCH RBC QN AUTO: 35.3 PG (ref 26–34)
MCHC RBC AUTO-ENTMCNC: 32.6 G/DL (ref 31–36)
MCV RBC AUTO: 108.2 FL (ref 80–100)
MONOCYTES ABSOLUTE: 2.7 K/UL (ref 0–1.3)
MONOCYTES RELATIVE PERCENT: 18.9 %
NEUTROPHILS ABSOLUTE: 8.3 K/UL (ref 1.7–7.7)
NEUTROPHILS RELATIVE PERCENT: 57.2 %
PDW BLD-RTO: 24.7 % (ref 12.4–15.4)
PLATELET # BLD: 410 K/UL (ref 135–450)
PMV BLD AUTO: 9.5 FL (ref 5–10.5)
POTASSIUM REFLEX MAGNESIUM: 4 MMOL/L (ref 3.5–5.1)
RBC # BLD: 2.14 M/UL (ref 4.2–5.9)
SODIUM BLD-SCNC: 141 MMOL/L (ref 136–145)
WBC # BLD: 14.5 K/UL (ref 4–11)

## 2020-08-16 PROCEDURE — 6370000000 HC RX 637 (ALT 250 FOR IP): Performed by: INTERNAL MEDICINE

## 2020-08-16 PROCEDURE — 2580000003 HC RX 258: Performed by: FAMILY MEDICINE

## 2020-08-16 PROCEDURE — 2580000003 HC RX 258: Performed by: NURSE PRACTITIONER

## 2020-08-16 PROCEDURE — 6360000002 HC RX W HCPCS: Performed by: INTERNAL MEDICINE

## 2020-08-16 PROCEDURE — 2580000003 HC RX 258: Performed by: INTERNAL MEDICINE

## 2020-08-16 PROCEDURE — 1200000000 HC SEMI PRIVATE

## 2020-08-16 PROCEDURE — 94761 N-INVAS EAR/PLS OXIMETRY MLT: CPT

## 2020-08-16 PROCEDURE — 36430 TRANSFUSION BLD/BLD COMPNT: CPT

## 2020-08-16 PROCEDURE — 94770 HC ETCO2 MONITOR DAILY: CPT

## 2020-08-16 PROCEDURE — 6360000002 HC RX W HCPCS: Performed by: NURSE PRACTITIONER

## 2020-08-16 PROCEDURE — 2700000000 HC OXYGEN THERAPY PER DAY

## 2020-08-16 PROCEDURE — 85025 COMPLETE CBC W/AUTO DIFF WBC: CPT

## 2020-08-16 PROCEDURE — 80048 BASIC METABOLIC PNL TOTAL CA: CPT

## 2020-08-16 PROCEDURE — 6370000000 HC RX 637 (ALT 250 FOR IP): Performed by: NURSE PRACTITIONER

## 2020-08-16 RX ORDER — 0.9 % SODIUM CHLORIDE 0.9 %
20 INTRAVENOUS SOLUTION INTRAVENOUS ONCE
Status: COMPLETED | OUTPATIENT
Start: 2020-08-16 | End: 2020-08-16

## 2020-08-16 RX ORDER — NALOXONE HYDROCHLORIDE 0.4 MG/ML
0.4 INJECTION, SOLUTION INTRAMUSCULAR; INTRAVENOUS; SUBCUTANEOUS PRN
Status: DISCONTINUED | OUTPATIENT
Start: 2020-08-16 | End: 2020-08-26 | Stop reason: HOSPADM

## 2020-08-16 RX ADMIN — Medication: at 17:42

## 2020-08-16 RX ADMIN — HYDROXYUREA 1000 MG: 500 CAPSULE ORAL at 20:38

## 2020-08-16 RX ADMIN — SODIUM CHLORIDE: 9 INJECTION, SOLUTION INTRAVENOUS at 09:12

## 2020-08-16 RX ADMIN — KETOROLAC TROMETHAMINE 30 MG: 30 INJECTION, SOLUTION INTRAMUSCULAR at 04:46

## 2020-08-16 RX ADMIN — OXYCODONE HYDROCHLORIDE 15 MG: 15 TABLET ORAL at 07:23

## 2020-08-16 RX ADMIN — CEFTRIAXONE SODIUM 1 G: 1 INJECTION, POWDER, FOR SOLUTION INTRAMUSCULAR; INTRAVENOUS at 08:48

## 2020-08-16 RX ADMIN — KETOROLAC TROMETHAMINE 30 MG: 30 INJECTION, SOLUTION INTRAMUSCULAR at 10:17

## 2020-08-16 RX ADMIN — BACITRACIN ZINC, NEOMYCIN SULFATE, AND POLYMYXIN B SULFATE: 400; 3.5; 5 OINTMENT TOPICAL at 20:34

## 2020-08-16 RX ADMIN — Medication 10 ML: at 07:25

## 2020-08-16 RX ADMIN — AZITHROMYCIN MONOHYDRATE 250 MG: 250 TABLET ORAL at 09:17

## 2020-08-16 RX ADMIN — HYDROMORPHONE HYDROCHLORIDE 1 MG: 2 INJECTION, SOLUTION INTRAMUSCULAR; INTRAVENOUS; SUBCUTANEOUS at 05:47

## 2020-08-16 RX ADMIN — BACITRACIN ZINC, NEOMYCIN SULFATE, AND POLYMYXIN B SULFATE: 400; 3.5; 5 OINTMENT TOPICAL at 07:24

## 2020-08-16 RX ADMIN — METHADONE HYDROCHLORIDE 10 MG: 10 TABLET ORAL at 15:33

## 2020-08-16 RX ADMIN — HYDROMORPHONE HYDROCHLORIDE 1 MG: 2 INJECTION, SOLUTION INTRAMUSCULAR; INTRAVENOUS; SUBCUTANEOUS at 01:46

## 2020-08-16 RX ADMIN — SODIUM CHLORIDE: 9 INJECTION, SOLUTION INTRAVENOUS at 22:47

## 2020-08-16 RX ADMIN — METHADONE HYDROCHLORIDE 10 MG: 10 TABLET ORAL at 20:33

## 2020-08-16 RX ADMIN — Medication 10 ML: at 20:34

## 2020-08-16 RX ADMIN — HYDROXYUREA 1000 MG: 500 CAPSULE ORAL at 08:50

## 2020-08-16 RX ADMIN — SODIUM CHLORIDE 20 ML: 9 INJECTION, SOLUTION INTRAVENOUS at 10:11

## 2020-08-16 RX ADMIN — HYDROMORPHONE HYDROCHLORIDE 1 MG: 2 INJECTION, SOLUTION INTRAMUSCULAR; INTRAVENOUS; SUBCUTANEOUS at 03:47

## 2020-08-16 RX ADMIN — Medication: at 08:56

## 2020-08-16 RX ADMIN — SODIUM CHLORIDE: 9 INJECTION, SOLUTION INTRAVENOUS at 08:48

## 2020-08-16 RX ADMIN — FOLIC ACID 1 MG: 1 TABLET ORAL at 07:23

## 2020-08-16 RX ADMIN — KETOROLAC TROMETHAMINE 30 MG: 30 INJECTION, SOLUTION INTRAMUSCULAR at 17:16

## 2020-08-16 RX ADMIN — OXYCODONE HYDROCHLORIDE 15 MG: 15 TABLET ORAL at 00:44

## 2020-08-16 RX ADMIN — KETOROLAC TROMETHAMINE 30 MG: 30 INJECTION, SOLUTION INTRAMUSCULAR at 22:43

## 2020-08-16 RX ADMIN — METHADONE HYDROCHLORIDE 10 MG: 10 TABLET ORAL at 09:17

## 2020-08-16 ASSESSMENT — PAIN DESCRIPTION - PAIN TYPE
TYPE: ACUTE PAIN
TYPE: ACUTE PAIN

## 2020-08-16 ASSESSMENT — PAIN SCALES - GENERAL
PAINLEVEL_OUTOF10: 8
PAINLEVEL_OUTOF10: 9
PAINLEVEL_OUTOF10: 7
PAINLEVEL_OUTOF10: 8
PAINLEVEL_OUTOF10: 8
PAINLEVEL_OUTOF10: 9
PAINLEVEL_OUTOF10: 8
PAINLEVEL_OUTOF10: 9
PAINLEVEL_OUTOF10: 8
PAINLEVEL_OUTOF10: 10
PAINLEVEL_OUTOF10: 9
PAINLEVEL_OUTOF10: 7

## 2020-08-16 ASSESSMENT — PAIN DESCRIPTION - LOCATION
LOCATION: ARM
LOCATION: ARM

## 2020-08-16 NOTE — PROGRESS NOTES
Hospitalist Progress Note      PCP: Janace Alpers, MD    Date of Admission: 8/6/2020    Chief Complaint:    Sickle cell crisis    Hospital Course:     Patient is known to have sickle cell anemia. Admitted with sickle cell crisis. Hematology also consulted. On empiric ceftriaxone for febrile illness. Infectious disease consult  Hemoglobin noted to be low. Transfusion ordered. Afebrile today. Subjective:    Left elbow pain is better.   No chest pain, no shortness of breath, no nausea, no vomiting      Medications:  Reviewed    Infusion Medications    HYDROmorphone      sodium chloride 100 mL/hr at 08/16/20 0912     Scheduled Medications    alteplase  2 mg Intracatheter Once    hydroxyurea  1,000 mg Oral BID    ketorolac  30 mg Intravenous Q6H    lidocaine  1 patch Transdermal Daily    cefTRIAXone (ROCEPHIN) IV  1 g Intravenous Q24H    azithromycin  250 mg Oral Daily    LORazepam  1 mg Intravenous Once    sodium chloride flush  10 mL Intravenous 2 times per day    neomycin-bacitracin-polymyxin   Topical BID    sodium chloride  20 mL Intravenous Once    methadone  10 mg Oral TID    folic acid  1 mg Oral Daily    sodium chloride flush  10 mL Intravenous 2 times per day    enoxaparin  40 mg Subcutaneous Daily     PRN Meds: naloxone, sodium chloride flush, oxyCODONE, diphenhydrAMINE, sodium chloride flush, acetaminophen **OR** acetaminophen      Intake/Output Summary (Last 24 hours) at 8/16/2020 1327  Last data filed at 8/16/2020 1032  Gross per 24 hour   Intake 4584.26 ml   Output 2400 ml   Net 2184.26 ml       Physical Exam Performed:    /81   Pulse 82   Temp 97.5 °F (36.4 °C) (Oral)   Resp 13   Ht 5' 8\" (1.727 m)   Wt 188 lb 11.4 oz (85.6 kg)   SpO2 100%   BMI 28.69 kg/m²     Physical Examination:   General appearance - alert, well appearing, and in no distress  Mental status - alert, oriented to person, place, and time  Eyes - pupils equal and reactive, extraocular eye osseous abnormality left elbow         XR KNEE LEFT (3 VIEWS)   Final Result   No acute osseous abnormality of the left knee. XR KNEE RIGHT (1-2 VIEWS)   Final Result   No acute abnormality of either knee. No significant arthritic change. XR KNEE LEFT (1-2 VIEWS)   Final Result   No acute abnormality of either knee. No significant arthritic change. CT CHEST PULMONARY EMBOLISM W CONTRAST   Final Result   No evidence of pulmonary embolism or acute pulmonary abnormality. XR CHEST PORTABLE   Final Result   No convincing acute cardiopulmonary abnormality. Assessment/Plan:    Active Hospital Problems    Diagnosis    Sepsis ([facility] [address] [A41.9]    Sickle cell crisis ([facility] [address] [D57.00]       PLAN    SIRS without infection, present on arrival  Currently afebrile   continue empiric antibiotics for now. Sickle cell crisis with pain and anemia  Opioid regimen started  Hematology consulted  Transfuse as needed. Another unit to be transfused today today. Leukocytosis  Probably nonspecific. No growth on cultures. Continue to monitor. DVT Prophylaxis: lovenox  Diet: DIET GENERAL;  Code Status: Full Code    PT/OT Eval Status: not indicated    Dispo - once pain is controllable with oral meds, pt. May be discharged.       Tom Cheek MD

## 2020-08-16 NOTE — PLAN OF CARE
Bed locked and in low position, bedside table urinal and call light within reach. Consistently rates pain 8-9/10, receiving nearly hourly pain meds and using several heat packs constantly over left arm.     Problem: Falls - Risk of:  Goal: Will remain free from falls  Description: Will remain free from falls  Outcome: Ongoing  Goal: Absence of physical injury  Description: Absence of physical injury  Outcome: Ongoing     Problem: Pain:  Goal: Pain level will decrease  Description: Pain level will decrease  Outcome: Ongoing  Goal: Control of acute pain  Description: Control of acute pain  Outcome: Ongoing  Goal: Control of chronic pain  Description: Control of chronic pain  Outcome: Ongoing

## 2020-08-16 NOTE — PROGRESS NOTES
ONCOLOGY HEMATOLOGY CARE PROGRESS NOTE      SUBJECTIVE:     The patient is still having left elbow pain. It is difficult to keep him pain free or controlled, but not give him so much pain medication that he has respiratory issues. ROS:   The remaining 10 point review of symptoms is unremarkable. OBJECTIVE        Physical    VITALS:  BP (!) 150/98   Pulse 76   Temp 97.7 °F (36.5 °C) (Oral)   Resp 16   Ht 5' 8\" (1.727 m)   Wt 188 lb 11.4 oz (85.6 kg)   SpO2 99%   BMI 28.69 kg/m²   TEMPERATURE:  Current - Temp: 97.7 °F (36.5 °C); Max - Temp  Av °F (36.7 °C)  Min: 97.7 °F (36.5 °C)  Max: 98.3 °F (36.8 °C)  PULSE OXIMETRY RANGE: SpO2  Av %  Min: 95 %  Max: 100 %  24HR INTAKE/OUTPUT:      Intake/Output Summary (Last 24 hours) at 2020 0903  Last data filed at 2020 2859  Gross per 24 hour   Intake 4509.08 ml   Output 3000 ml   Net 1509.08 ml       CONSTITUTIONAL:  awake, alert, cooperative, no apparent distress, HEENT oral pharynx , no scleral icterus. Likely small skin infection under the right orbit. HEMATOLOGIC/LYMPHATICS:  no cervical lymphadenopathy, no supraclavicular lymphadenopathy, no axillary lymphadenopathy and no inguinal lymphadenopathy  LUNGS:  No increased work of breathing, good air exchange, clear to auscultation bilaterally, no crackles or wheezing  CARDIOVASCULAR:  , regular rate and rhythm, normal S1 and S2, no S3 or S4, and no murmur noted  ABDOMEN:  No scars, normal bowel sounds, soft, non-distended, non-tender, no masses palpated, no hepatosplenomegally  MUSCULOSKELETAL:  There is no redness, warmth, or swelling of the joints. EXTREMETIES: No clubbing cynosis or edema  NEUROLOGIC:  Awake, alert, oriented to name, place and time. Cranial nerves II-XII are grossly intact. Motor is 5 out of 5 bilaterally.    SKIN:  no bruising or bleeding      Data      Recent Labs     20  0547 08/15/20  0540 08/15/20  1552 20  0535

## 2020-08-16 NOTE — PROGRESS NOTES
Shift assessment updated as charted. Patient a/ox4. VSS. Patient c/o L arm pain, see MAR. Heat packs given. Will reassess. Call light in reach.

## 2020-08-17 ENCOUNTER — APPOINTMENT (OUTPATIENT)
Dept: CT IMAGING | Age: 20
DRG: 812 | End: 2020-08-17
Payer: COMMERCIAL

## 2020-08-17 ENCOUNTER — APPOINTMENT (OUTPATIENT)
Dept: VASCULAR LAB | Age: 20
DRG: 812 | End: 2020-08-17
Payer: COMMERCIAL

## 2020-08-17 LAB
ABO/RH: NORMAL
ALBUMIN SERPL-MCNC: 2.8 G/DL (ref 3.4–5)
ALP BLD-CCNC: 72 U/L (ref 40–129)
ALT SERPL-CCNC: 19 U/L (ref 10–40)
ANION GAP SERPL CALCULATED.3IONS-SCNC: 10 MMOL/L (ref 3–16)
ANTIBODY SCREEN: NORMAL
AST SERPL-CCNC: 26 U/L (ref 15–37)
BASOPHILS ABSOLUTE: 0.1 K/UL (ref 0–0.2)
BASOPHILS RELATIVE PERCENT: 0.9 %
BILIRUB SERPL-MCNC: 2.6 MG/DL (ref 0–1)
BILIRUBIN DIRECT: 0.5 MG/DL (ref 0–0.3)
BILIRUBIN, INDIRECT: 2.1 MG/DL (ref 0–1)
BLOOD BANK DISPENSE STATUS: NORMAL
BLOOD BANK PRODUCT CODE: NORMAL
BPU ID: NORMAL
BUN BLDV-MCNC: 7 MG/DL (ref 7–20)
CALCIUM SERPL-MCNC: 9.3 MG/DL (ref 8.3–10.6)
CHLORIDE BLD-SCNC: 103 MMOL/L (ref 99–110)
CO2: 27 MMOL/L (ref 21–32)
CREAT SERPL-MCNC: 0.6 MG/DL (ref 0.9–1.3)
DESCRIPTION BLOOD BANK: NORMAL
EOSINOPHILS ABSOLUTE: 0.2 K/UL (ref 0–0.6)
EOSINOPHILS RELATIVE PERCENT: 1.4 %
GFR AFRICAN AMERICAN: >60
GFR NON-AFRICAN AMERICAN: >60
GLUCOSE BLD-MCNC: 107 MG/DL (ref 70–99)
HAPTOGLOBIN: <10 MG/DL (ref 30–200)
HCT VFR BLD CALC: 24.6 % (ref 40.5–52.5)
HCT VFR BLD CALC: 26.2 % (ref 40.5–52.5)
HEMATOLOGY PATH CONSULT: NO
HEMOGLOBIN: 8.3 G/DL (ref 13.5–17.5)
IMMATURE RETIC FRACT: 0.66 (ref 0.21–0.37)
IRON SATURATION: 35 % (ref 20–50)
IRON: 63 UG/DL (ref 59–158)
LACTATE DEHYDROGENASE: 763 U/L (ref 100–190)
LYMPHOCYTES ABSOLUTE: 3.1 K/UL (ref 1–5.1)
LYMPHOCYTES RELATIVE PERCENT: 24.9 %
MAGNESIUM: 1.5 MG/DL (ref 1.8–2.4)
MCH RBC QN AUTO: 36.3 PG (ref 26–34)
MCHC RBC AUTO-ENTMCNC: 33.7 G/DL (ref 31–36)
MCV RBC AUTO: 107.7 FL (ref 80–100)
MONOCYTES ABSOLUTE: 1.8 K/UL (ref 0–1.3)
MONOCYTES RELATIVE PERCENT: 14.3 %
NEUTROPHILS ABSOLUTE: 7.3 K/UL (ref 1.7–7.7)
NEUTROPHILS RELATIVE PERCENT: 58.5 %
PDW BLD-RTO: 23.3 % (ref 12.4–15.4)
PLATELET # BLD: 544 K/UL (ref 135–450)
PMV BLD AUTO: 8.8 FL (ref 5–10.5)
POTASSIUM REFLEX MAGNESIUM: 4.1 MMOL/L (ref 3.5–5.1)
RBC # BLD: 2.29 M/UL (ref 4.2–5.9)
RETICULOCYTE ABSOLUTE COUNT: 0.19 M/UL
RETICULOCYTE COUNT PCT: 7.99 % (ref 0.5–2.18)
SODIUM BLD-SCNC: 140 MMOL/L (ref 136–145)
TOTAL IRON BINDING CAPACITY: 181 UG/DL (ref 260–445)
TOTAL PROTEIN: 5.7 G/DL (ref 6.4–8.2)
WBC # BLD: 12.4 K/UL (ref 4–11)

## 2020-08-17 PROCEDURE — 84238 ASSAY NONENDOCRINE RECEPTOR: CPT

## 2020-08-17 PROCEDURE — 86923 COMPATIBILITY TEST ELECTRIC: CPT

## 2020-08-17 PROCEDURE — 82728 ASSAY OF FERRITIN: CPT

## 2020-08-17 PROCEDURE — 70496 CT ANGIOGRAPHY HEAD: CPT

## 2020-08-17 PROCEDURE — 93970 EXTREMITY STUDY: CPT

## 2020-08-17 PROCEDURE — 86900 BLOOD TYPING SEROLOGIC ABO: CPT

## 2020-08-17 PROCEDURE — 85660 RBC SICKLE CELL TEST: CPT

## 2020-08-17 PROCEDURE — 2580000003 HC RX 258: Performed by: FAMILY MEDICINE

## 2020-08-17 PROCEDURE — 2580000003 HC RX 258: Performed by: NURSE PRACTITIONER

## 2020-08-17 PROCEDURE — 82746 ASSAY OF FOLIC ACID SERUM: CPT

## 2020-08-17 PROCEDURE — 85045 AUTOMATED RETICULOCYTE COUNT: CPT

## 2020-08-17 PROCEDURE — 1200000000 HC SEMI PRIVATE

## 2020-08-17 PROCEDURE — 94761 N-INVAS EAR/PLS OXIMETRY MLT: CPT

## 2020-08-17 PROCEDURE — 6370000000 HC RX 637 (ALT 250 FOR IP): Performed by: NURSE PRACTITIONER

## 2020-08-17 PROCEDURE — 86901 BLOOD TYPING SEROLOGIC RH(D): CPT

## 2020-08-17 PROCEDURE — 6360000002 HC RX W HCPCS: Performed by: INTERNAL MEDICINE

## 2020-08-17 PROCEDURE — 86850 RBC ANTIBODY SCREEN: CPT

## 2020-08-17 PROCEDURE — 82607 VITAMIN B-12: CPT

## 2020-08-17 PROCEDURE — 2700000000 HC OXYGEN THERAPY PER DAY

## 2020-08-17 PROCEDURE — 2580000003 HC RX 258: Performed by: INTERNAL MEDICINE

## 2020-08-17 PROCEDURE — 6370000000 HC RX 637 (ALT 250 FOR IP): Performed by: INTERNAL MEDICINE

## 2020-08-17 PROCEDURE — 85025 COMPLETE CBC W/AUTO DIFF WBC: CPT

## 2020-08-17 PROCEDURE — 83540 ASSAY OF IRON: CPT

## 2020-08-17 PROCEDURE — 80048 BASIC METABOLIC PNL TOTAL CA: CPT

## 2020-08-17 PROCEDURE — 83550 IRON BINDING TEST: CPT

## 2020-08-17 PROCEDURE — 6360000002 HC RX W HCPCS: Performed by: NURSE PRACTITIONER

## 2020-08-17 PROCEDURE — 80076 HEPATIC FUNCTION PANEL: CPT

## 2020-08-17 PROCEDURE — 83735 ASSAY OF MAGNESIUM: CPT

## 2020-08-17 PROCEDURE — 94770 HC ETCO2 MONITOR DAILY: CPT

## 2020-08-17 PROCEDURE — P9016 RBC LEUKOCYTES REDUCED: HCPCS

## 2020-08-17 PROCEDURE — 83615 LACTATE (LD) (LDH) ENZYME: CPT

## 2020-08-17 PROCEDURE — 83010 ASSAY OF HAPTOGLOBIN QUANT: CPT

## 2020-08-17 PROCEDURE — 6360000004 HC RX CONTRAST MEDICATION: Performed by: INTERNAL MEDICINE

## 2020-08-17 PROCEDURE — 83020 HEMOGLOBIN ELECTROPHORESIS: CPT

## 2020-08-17 RX ORDER — 0.9 % SODIUM CHLORIDE 0.9 %
250 INTRAVENOUS SOLUTION INTRAVENOUS ONCE
Status: COMPLETED | OUTPATIENT
Start: 2020-08-17 | End: 2020-08-17

## 2020-08-17 RX ORDER — FOLIC ACID 1 MG/1
2 TABLET ORAL DAILY
Status: DISCONTINUED | OUTPATIENT
Start: 2020-08-18 | End: 2020-08-26 | Stop reason: HOSPADM

## 2020-08-17 RX ORDER — SODIUM CHLORIDE 9 MG/ML
INJECTION, SOLUTION INTRAVENOUS
Status: DISPENSED
Start: 2020-08-17 | End: 2020-08-17

## 2020-08-17 RX ORDER — SODIUM CHLORIDE 9 MG/ML
INJECTION, SOLUTION INTRAVENOUS ONCE
Status: DISCONTINUED | OUTPATIENT
Start: 2020-08-17 | End: 2020-08-17 | Stop reason: SDUPTHER

## 2020-08-17 RX ORDER — SODIUM CHLORIDE 9 MG/ML
INJECTION, SOLUTION INTRAVENOUS ONCE
Status: COMPLETED | OUTPATIENT
Start: 2020-08-17 | End: 2020-08-17

## 2020-08-17 RX ADMIN — SODIUM CHLORIDE: 9 INJECTION, SOLUTION INTRAVENOUS at 09:16

## 2020-08-17 RX ADMIN — METHADONE HYDROCHLORIDE 10 MG: 10 TABLET ORAL at 20:35

## 2020-08-17 RX ADMIN — HYDROXYUREA 1000 MG: 500 CAPSULE ORAL at 20:35

## 2020-08-17 RX ADMIN — CEFTRIAXONE SODIUM 1 G: 1 INJECTION, POWDER, FOR SOLUTION INTRAMUSCULAR; INTRAVENOUS at 09:16

## 2020-08-17 RX ADMIN — Medication: at 18:23

## 2020-08-17 RX ADMIN — KETOROLAC TROMETHAMINE 30 MG: 30 INJECTION, SOLUTION INTRAMUSCULAR at 05:19

## 2020-08-17 RX ADMIN — SODIUM CHLORIDE 250 ML: 9 INJECTION, SOLUTION INTRAVENOUS at 19:43

## 2020-08-17 RX ADMIN — Medication: at 03:21

## 2020-08-17 RX ADMIN — HYDROXYUREA 1000 MG: 500 CAPSULE ORAL at 09:21

## 2020-08-17 RX ADMIN — KETOROLAC TROMETHAMINE 30 MG: 30 INJECTION, SOLUTION INTRAMUSCULAR at 16:34

## 2020-08-17 RX ADMIN — KETOROLAC TROMETHAMINE 30 MG: 30 INJECTION, SOLUTION INTRAMUSCULAR at 09:16

## 2020-08-17 RX ADMIN — BACITRACIN ZINC, NEOMYCIN SULFATE, AND POLYMYXIN B SULFATE: 400; 3.5; 5 OINTMENT TOPICAL at 20:36

## 2020-08-17 RX ADMIN — KETOROLAC TROMETHAMINE 30 MG: 30 INJECTION, SOLUTION INTRAMUSCULAR at 22:18

## 2020-08-17 RX ADMIN — IOPAMIDOL 75 ML: 755 INJECTION, SOLUTION INTRAVENOUS at 14:54

## 2020-08-17 RX ADMIN — SODIUM CHLORIDE: 9 INJECTION, SOLUTION INTRAVENOUS at 11:28

## 2020-08-17 RX ADMIN — Medication 10 ML: at 20:36

## 2020-08-17 RX ADMIN — FOLIC ACID 1 MG: 1 TABLET ORAL at 09:16

## 2020-08-17 RX ADMIN — METHADONE HYDROCHLORIDE 10 MG: 10 TABLET ORAL at 09:15

## 2020-08-17 RX ADMIN — AZITHROMYCIN MONOHYDRATE 250 MG: 250 TABLET ORAL at 09:16

## 2020-08-17 RX ADMIN — BACITRACIN ZINC, NEOMYCIN SULFATE, AND POLYMYXIN B SULFATE: 400; 3.5; 5 OINTMENT TOPICAL at 09:22

## 2020-08-17 RX ADMIN — Medication 10 ML: at 09:19

## 2020-08-17 RX ADMIN — SODIUM CHLORIDE: 9 INJECTION, SOLUTION INTRAVENOUS at 20:43

## 2020-08-17 ASSESSMENT — PAIN SCALES - GENERAL
PAINLEVEL_OUTOF10: 8
PAINLEVEL_OUTOF10: 7
PAINLEVEL_OUTOF10: 9
PAINLEVEL_OUTOF10: 9
PAINLEVEL_OUTOF10: 8

## 2020-08-17 NOTE — PROGRESS NOTES
08/17/20 0804   Oxygen Therapy/Pulse Ox   O2 Therapy Oxygen   $Oxygen $Daily Charge   O2 Device Nasal cannula   O2 Flow Rate (L/min) 2 L/min   Resp 16   SpO2 99 %   Pulse Oximeter Device Mode Intermittent   Pulse Oximeter Device Location Right;Finger   $Pulse Oximeter $Spot check (multiple/continuous)   $End Tidal CO2 46

## 2020-08-17 NOTE — PROGRESS NOTES
Hospitalist Progress Note      PCP: Pito Rodrigues MD    Date of Admission: 8/6/2020    Chief Complaint:    Sickle cell crisis    Hospital Course:     Patient is known to have sickle cell anemia. Admitted with sickle cell crisis. Hematology also consulted. Infectious disease consulted  Transfusion performed yesterday. Hemoglobin better today. Remains afebrile. Not on antibiotics. Complains of left arm pain. Subjective:    Left elbow pain is better.   No chest pain, no shortness of breath, no nausea, no vomiting      Medications:  Reviewed    Infusion Medications    sodium chloride      HYDROmorphone Stopped (08/17/20 1122)    sodium chloride 100 mL/hr at 08/17/20 0916     Scheduled Medications    [START ON 8/24/7506] folic acid  2 mg Oral Daily    alteplase  2 mg Intracatheter Once    hydroxyurea  1,000 mg Oral BID    ketorolac  30 mg Intravenous Q6H    lidocaine  1 patch Transdermal Daily    LORazepam  1 mg Intravenous Once    sodium chloride flush  10 mL Intravenous 2 times per day    neomycin-bacitracin-polymyxin   Topical BID    sodium chloride  20 mL Intravenous Once    methadone  10 mg Oral TID    sodium chloride flush  10 mL Intravenous 2 times per day    enoxaparin  40 mg Subcutaneous Daily     PRN Meds: naloxone, sodium chloride flush, oxyCODONE, diphenhydrAMINE, sodium chloride flush, acetaminophen **OR** acetaminophen      Intake/Output Summary (Last 24 hours) at 8/17/2020 1416  Last data filed at 8/17/2020 1407  Gross per 24 hour   Intake 3113.61 ml   Output 1875 ml   Net 1238.61 ml       Physical Exam Performed:    /71   Pulse 92   Temp 97.9 °F (36.6 °C) (Oral)   Resp 18   Ht 5' 8\" (1.727 m)   Wt 177 lb 12.8 oz (80.6 kg)   SpO2 100%   BMI 27.03 kg/m²     Physical Examination:   General appearance - alert, well appearing, and in no distress  Mental status - alert, oriented to person, place, and time  Eyes - pupils equal and reactive, extraocular eye movements intact  Mouth - mucous membranes moist, pharynx normal without lesions  Neck - supple, no significant adenopathy  Chest - clear to auscultation, no wheezes, rales or rhonchi, symmetric air entry  Heart - normal rate, regular rhythm, normal S1, S2, no murmurs, rubs, clicks or gallops  Abdomen - soft, nontender, nondistended, no masses or organomegaly  Neurological - alert, oriented, normal speech, no focal findings or movement disorder noted. Left arm distal and proximal muscles are tender to touch. Muscle strength is reduced because of pain. Musculoskeletal - no joint tenderness, deformity or swelling  Extremities - peripheral pulses normal, no pedal edema, no clubbing or cyanosis  Skin - normal coloration and turgor, no rashes, no suspicious skin lesions noted        Labs:   Recent Labs     08/15/20  0540 08/15/20  1552 08/16/20  0535 08/17/20  0530 08/17/20  1119   WBC 11.4*  --  14.5* 12.4*  --    HGB 5.8* 7.6* 7.5* 8.3*  --    HCT 17.5* 23.2* 23.1* 24.6* 26.2*     --  410 544*  --      Recent Labs     08/15/20  0540 08/16/20  0535 08/17/20  0530    141 140   K 3.5 4.0 4.1   * 105 103   CO2 24 26 27   BUN 7 6* 7   CREATININE <0.5* 0.6* 0.6*   CALCIUM 8.1* 9.6 9.3     Recent Labs     08/17/20  1119   AST 26   ALT 19   BILIDIR 0.5*   BILITOT 2.6*   ALKPHOS 72     No results for input(s): INR in the last 72 hours. No results for input(s): [place] in the last 72 hours. Urinalysis:      Lab Results   Component Value Date    NITRU Negative 08/14/2020    WBCUA None seen 08/14/2020    RBCUA 0-2 08/14/2020    BLOODU SMALL 08/14/2020    SPECGRAV 1.015 08/14/2020    GLUCOSEU Negative 08/14/2020       Radiology:  IR PICC WO SQ PORT/PUMP > 5 YEARS   Final Result   Successful placement of PICC line. XR CHEST (2 VW)   Final Result   No evidence of acute cardiopulmonary disease.          XR ELBOW LEFT (MIN 3 VIEWS)   Final Result   No acute osseous abnormality left humerus No acute osseous abnormality left elbow         XR HUMERUS LEFT (MIN 2 VIEWS)   Final Result   No acute osseous abnormality left humerus      No acute osseous abnormality left elbow         XR KNEE LEFT (3 VIEWS)   Final Result   No acute osseous abnormality of the left knee. XR KNEE RIGHT (1-2 VIEWS)   Final Result   No acute abnormality of either knee. No significant arthritic change. XR KNEE LEFT (1-2 VIEWS)   Final Result   No acute abnormality of either knee. No significant arthritic change. CT CHEST PULMONARY EMBOLISM W CONTRAST   Final Result   No evidence of pulmonary embolism or acute pulmonary abnormality. XR CHEST PORTABLE   Final Result   No convincing acute cardiopulmonary abnormality. CTA HEAD W WO CONTRAST    (Results Pending)   VL Extremity Venous Bilateral    (Results Pending)           Assessment/Plan:    Active Hospital Problems    Diagnosis    Sepsis ([facility] [address] [A41.9]    Sickle cell crisis ([facility] [address] [D57.00]       PLAN    SIRS without infection, present on arrival  Currently afebrile   Continue IV hydration  No evidence of infection. Off antibiotics. Sickle cell crisis with pain and anemia  Opioid regimen started  Hematology consulted  Transfuse as needed. Not needed today. Leukocytosis  Probably nonspecific. No growth on cultures. WBC count is less than yesterday. Continue to monitor. Left arm pain and tenderness  Noted work-up ordered by hematology for possible acute CVA related to sickle cell crisis. I will also check upper extremity venous Dopplers to evaluate for acute DVT. Patient has left-sided PICC. Discussed with the patient, questions answered    DVT Prophylaxis: Lovenox  Diet: DIET GENERAL;  Code Status: Full Code    PT/OT Eval Status: not indicated    Dispo - once pain is controllable with oral meds, patient may be discharged.       Nava Acuna MD

## 2020-08-17 NOTE — PROGRESS NOTES
08/17/20 1205   Oxygen Therapy/Pulse Ox   O2 Therapy Oxygen   O2 Device Nasal cannula   O2 Flow Rate (L/min) 2 L/min   Resp 12   SpO2 100 %   $End Tidal CO2 44

## 2020-08-17 NOTE — PROGRESS NOTES
Pt alert and oriented. VSS. Assessment completed as charted. Pt c/o left arm pain 8/10, scheduled Methadone given per MAR. Dilaudid PCA in place. Heat packs given for comfort. Resting in bed, denies further needs at present time. Call light and bedside table within reach. Bed locked and in lowest position. Will continue to monitor.

## 2020-08-17 NOTE — PROGRESS NOTES
Pt assessment completed and charted. VSS. Pt a/o. Pt stated pain 8/10. Pt on PCA pump. Bed in lowest position and wheels locked. Call light within reach. Bedside table within reach. Non-skid footwear in place. Pt denies any other needs at this time. Pt calls out appropriately. Will continue to monitor.

## 2020-08-17 NOTE — PROGRESS NOTES
ONCOLOGY HEMATOLOGY CARE PROGRESS NOTE      SUBJECTIVE:     He now complains of trouble moving his L arm. Has decreased L hand  strength. Hgb now 8.3.         ROS:   The remaining 10 point review of symptoms is unremarkable. OBJECTIVE        Physical    VITALS:  BP (!) 105/56   Pulse 73   Temp 98 °F (36.7 °C) (Oral)   Resp 18   Ht 5' 8\" (1.727 m)   Wt 177 lb 12.8 oz (80.6 kg)   SpO2 100%   BMI 27.03 kg/m²   TEMPERATURE:  Current - Temp: 98 °F (36.7 °C); Max - Temp  Av.9 °F (36.6 °C)  Min: 97.5 °F (36.4 °C)  Max: 98.2 °F (36.8 °C)  PULSE OXIMETRY RANGE: SpO2  Av.7 %  Min: 99 %  Max: 100 %  24HR INTAKE/OUTPUT:      Intake/Output Summary (Last 24 hours) at 2020 1037  Last data filed at 2020 0918  Gross per 24 hour   Intake 3464.33 ml   Output 1850 ml   Net 1614.33 ml       CONSTITUTIONAL:  awake, alert, cooperative, no apparent distress, HEENT oral pharynx , no scleral icterus. Likely small skin infection under the right orbit. HEMATOLOGIC/LYMPHATICS:  no cervical lymphadenopathy, no supraclavicular lymphadenopathy, no axillary lymphadenopathy and no inguinal lymphadenopathy  LUNGS:  No increased work of breathing, good air exchange, clear to auscultation bilaterally, no crackles or wheezing  CARDIOVASCULAR:  , regular rate and rhythm, normal S1 and S2, no S3 or S4, and no murmur noted  ABDOMEN:  No scars, normal bowel sounds, soft, non-distended, non-tender, no masses palpated, no hepatosplenomegally  MUSCULOSKELETAL:  There is no redness, warmth, or swelling of the joints. EXTREMETIES: No clubbing cynosis or edema  NEUROLOGIC:  L hand  strength is 3/5 with 3/5 MS with abduction and adduction.  Pupils equal and reactive to light bilaterally, smile is symmetrical, no tongue deviation   SKIN:  no bruising or bleeding      Data      Recent Labs     08/15/20  0540 08/15/20  1552 20  0535 20  0530   WBC 11.4*  --  14.5* 12.4*   HGB 5.8* 7. 6* 7.5* 8.3*   HCT 17.5* 23.2* 23.1* 24.6*     --  410 544*   .6*  --  108.2* 107.7*        Recent Labs     08/15/20  0540 08/16/20  0535 08/17/20  0530    141 140   K 3.5 4.0 4.1   * 105 103   CO2 24 26 27   BUN 7 6* 7   CREATININE <0.5* 0.6* 0.6*     No results for input(s): AST, ALT, ALB, BILIDIR, BILITOT, ALKPHOS in the last 72 hours. Magnesium:    Lab Results   Component Value Date    MG 1.70 08/15/2020    MG 2.10 07/30/2020    MG 2.00 07/29/2020         Problem List  Patient Active Problem List   Diagnosis    Sickle cell pain crisis ([facility] [address]    Asthma    Sickle cell crisis ([facility] [address]    Sepsis ([facility] [address]       ASSESSMENT AND PLAN:    Sickle cell crisis:  -No evidence of acute chest pain syndrome  - Hgb now 8.3 post 2 units 8-16 (Hgb was 5.8 at that time)   - Hold on red cell exchange for now   -CTPA is unremarkable  -Continue with Hydrea and folic acid  - Check LDH/ hapto, retic, Hgb electrophoresis, iron studies, B12 and folate   - Consider checking CT A/P if pain cont to linger- would need to eval liver and spleen size - gallstones vs cholecystitis (however, liver panel normal to date- repeating today)     Access  - getting PICC line   - need to discuss outpatient port if this is a recurring issue     Pain:  - Cont Methadone and PCA at current settings. 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 Lonnie Wayne.      Fever  - pan cx, chest xray -Both are normal  - procal Mildly elevated 0.51 lactic acid is normal  - cont Rocephin and Zithromax - blood cx NGTD- can stop today 8-17   -His fever has subsided and it may be due to his crisis  -This can be stopped when his cultures return negative with respect to oncology if hospitalist

## 2020-08-17 NOTE — CARE COORDINATION
Chart reviewed day 11. Followed by Hem/onc, and IM. Sickle cell crisis. CT head pending. Considering MRI L arm. D/c pending pain control without PCA.  Abhi Cook RN

## 2020-08-17 NOTE — PROGRESS NOTES
08/17/20 1617   Oxygen Therapy/Pulse Ox   O2 Therapy Oxygen   O2 Device Nasal cannula   O2 Flow Rate (L/min) 2 L/min   Resp 16   SpO2 100 %   $End Tidal CO2 46

## 2020-08-18 ENCOUNTER — APPOINTMENT (OUTPATIENT)
Dept: INTERVENTIONAL RADIOLOGY/VASCULAR | Age: 20
DRG: 812 | End: 2020-08-18
Payer: COMMERCIAL

## 2020-08-18 ENCOUNTER — APPOINTMENT (OUTPATIENT)
Dept: CT IMAGING | Age: 20
DRG: 812 | End: 2020-08-18
Payer: COMMERCIAL

## 2020-08-18 LAB
ANION GAP SERPL CALCULATED.3IONS-SCNC: 6 MMOL/L (ref 3–16)
BASOPHILS ABSOLUTE: 0 K/UL (ref 0–0.2)
BASOPHILS RELATIVE PERCENT: 0.4 %
BLOOD BANK DISPENSE STATUS: NORMAL
BLOOD BANK PRODUCT CODE: NORMAL
BLOOD CULTURE, ROUTINE: NORMAL
BPU ID: NORMAL
BUN BLDV-MCNC: 7 MG/DL (ref 7–20)
CALCIUM SERPL-MCNC: 8.5 MG/DL (ref 8.3–10.6)
CHLORIDE BLD-SCNC: 108 MMOL/L (ref 99–110)
CO2: 26 MMOL/L (ref 21–32)
CREAT SERPL-MCNC: <0.5 MG/DL (ref 0.9–1.3)
CULTURE, BLOOD 2: NORMAL
DESCRIPTION BLOOD BANK: NORMAL
EOSINOPHILS ABSOLUTE: 0.2 K/UL (ref 0–0.6)
EOSINOPHILS RELATIVE PERCENT: 1.7 %
GFR AFRICAN AMERICAN: >60
GFR NON-AFRICAN AMERICAN: >60
GLUCOSE BLD-MCNC: 102 MG/DL (ref 70–99)
HCT VFR BLD CALC: 22.1 % (ref 40.5–52.5)
HEMOGLOBIN: 7.5 G/DL (ref 13.5–17.5)
LYMPHOCYTES ABSOLUTE: 2.2 K/UL (ref 1–5.1)
LYMPHOCYTES RELATIVE PERCENT: 19.5 %
MCH RBC QN AUTO: 35.8 PG (ref 26–34)
MCHC RBC AUTO-ENTMCNC: 33.8 G/DL (ref 31–36)
MCV RBC AUTO: 105.7 FL (ref 80–100)
MONOCYTES ABSOLUTE: 1.5 K/UL (ref 0–1.3)
MONOCYTES RELATIVE PERCENT: 13.9 %
NEUTROPHILS ABSOLUTE: 7.1 K/UL (ref 1.7–7.7)
NEUTROPHILS RELATIVE PERCENT: 64.5 %
PDW BLD-RTO: 22 % (ref 12.4–15.4)
PLATELET # BLD: 602 K/UL (ref 135–450)
PMV BLD AUTO: 8.3 FL (ref 5–10.5)
POTASSIUM REFLEX MAGNESIUM: 3.7 MMOL/L (ref 3.5–5.1)
RBC # BLD: 2.09 M/UL (ref 4.2–5.9)
SODIUM BLD-SCNC: 140 MMOL/L (ref 136–145)
WBC # BLD: 11 K/UL (ref 4–11)

## 2020-08-18 PROCEDURE — 85025 COMPLETE CBC W/AUTO DIFF WBC: CPT

## 2020-08-18 PROCEDURE — 6360000004 HC RX CONTRAST MEDICATION: Performed by: NURSE PRACTITIONER

## 2020-08-18 PROCEDURE — 6360000002 HC RX W HCPCS: Performed by: NURSE PRACTITIONER

## 2020-08-18 PROCEDURE — 02H633Z INSERTION OF INFUSION DEVICE INTO RIGHT ATRIUM, PERCUTANEOUS APPROACH: ICD-10-PCS | Performed by: RADIOLOGY

## 2020-08-18 PROCEDURE — 76937 US GUIDE VASCULAR ACCESS: CPT

## 2020-08-18 PROCEDURE — 6370000000 HC RX 637 (ALT 250 FOR IP): Performed by: NURSE PRACTITIONER

## 2020-08-18 PROCEDURE — 36556 INSERT NON-TUNNEL CV CATH: CPT

## 2020-08-18 PROCEDURE — 2580000003 HC RX 258

## 2020-08-18 PROCEDURE — C1752 CATH,HEMODIALYSIS,SHORT-TERM: HCPCS

## 2020-08-18 PROCEDURE — 94770 HC ETCO2 MONITOR DAILY: CPT

## 2020-08-18 PROCEDURE — 6360000002 HC RX W HCPCS: Performed by: RADIOLOGY

## 2020-08-18 PROCEDURE — 74177 CT ABD & PELVIS W/CONTRAST: CPT

## 2020-08-18 PROCEDURE — 2700000000 HC OXYGEN THERAPY PER DAY

## 2020-08-18 PROCEDURE — 94761 N-INVAS EAR/PLS OXIMETRY MLT: CPT

## 2020-08-18 PROCEDURE — 2580000003 HC RX 258: Performed by: NURSE PRACTITIONER

## 2020-08-18 PROCEDURE — 80048 BASIC METABOLIC PNL TOTAL CA: CPT

## 2020-08-18 PROCEDURE — 6370000000 HC RX 637 (ALT 250 FOR IP): Performed by: INTERNAL MEDICINE

## 2020-08-18 PROCEDURE — 77001 FLUOROGUIDE FOR VEIN DEVICE: CPT

## 2020-08-18 PROCEDURE — 1200000000 HC SEMI PRIVATE

## 2020-08-18 RX ORDER — MAGNESIUM SULFATE IN WATER 40 MG/ML
2 INJECTION, SOLUTION INTRAVENOUS ONCE
Status: COMPLETED | OUTPATIENT
Start: 2020-08-18 | End: 2020-08-18

## 2020-08-18 RX ORDER — KETAMINE HYDROCHLORIDE 100 MG/ML
INJECTION, SOLUTION INTRAMUSCULAR; INTRAVENOUS
Status: COMPLETED | OUTPATIENT
Start: 2020-08-18 | End: 2020-08-18

## 2020-08-18 RX ORDER — DIPHENHYDRAMINE HCL 25 MG
25 TABLET ORAL ONCE
Status: COMPLETED | OUTPATIENT
Start: 2020-08-18 | End: 2020-08-18

## 2020-08-18 RX ORDER — 0.9 % SODIUM CHLORIDE 0.9 %
20 INTRAVENOUS SOLUTION INTRAVENOUS ONCE
Status: DISCONTINUED | OUTPATIENT
Start: 2020-08-18 | End: 2020-08-26 | Stop reason: HOSPADM

## 2020-08-18 RX ORDER — DIPHENHYDRAMINE HYDROCHLORIDE 50 MG/ML
50 INJECTION INTRAMUSCULAR; INTRAVENOUS ONCE
Status: DISCONTINUED | OUTPATIENT
Start: 2020-08-18 | End: 2020-08-26 | Stop reason: HOSPADM

## 2020-08-18 RX ORDER — FENTANYL CITRATE 50 UG/ML
INJECTION, SOLUTION INTRAMUSCULAR; INTRAVENOUS
Status: COMPLETED | OUTPATIENT
Start: 2020-08-18 | End: 2020-08-18

## 2020-08-18 RX ORDER — MIDAZOLAM HYDROCHLORIDE 5 MG/ML
INJECTION INTRAMUSCULAR; INTRAVENOUS
Status: COMPLETED | OUTPATIENT
Start: 2020-08-18 | End: 2020-08-18

## 2020-08-18 RX ORDER — ONDANSETRON 2 MG/ML
4 INJECTION INTRAMUSCULAR; INTRAVENOUS EVERY 6 HOURS PRN
Status: DISCONTINUED | OUTPATIENT
Start: 2020-08-18 | End: 2020-08-26 | Stop reason: HOSPADM

## 2020-08-18 RX ORDER — ACETAMINOPHEN 325 MG/1
650 TABLET ORAL ONCE
Status: COMPLETED | OUTPATIENT
Start: 2020-08-18 | End: 2020-08-18

## 2020-08-18 RX ORDER — SODIUM CHLORIDE 9 MG/ML
INJECTION, SOLUTION INTRAVENOUS
Status: COMPLETED
Start: 2020-08-18 | End: 2020-08-18

## 2020-08-18 RX ADMIN — KETOROLAC TROMETHAMINE 30 MG: 30 INJECTION, SOLUTION INTRAMUSCULAR at 23:08

## 2020-08-18 RX ADMIN — FENTANYL CITRATE 25 MCG: 50 INJECTION INTRAMUSCULAR; INTRAVENOUS at 13:40

## 2020-08-18 RX ADMIN — MAGNESIUM SULFATE HEPTAHYDRATE 2 G: 40 INJECTION, SOLUTION INTRAVENOUS at 11:54

## 2020-08-18 RX ADMIN — ACETAMINOPHEN 650 MG: 325 TABLET ORAL at 19:32

## 2020-08-18 RX ADMIN — BACITRACIN ZINC, NEOMYCIN SULFATE, AND POLYMYXIN B SULFATE: 400; 3.5; 5 OINTMENT TOPICAL at 10:15

## 2020-08-18 RX ADMIN — IOPAMIDOL 75 ML: 755 INJECTION, SOLUTION INTRAVENOUS at 15:19

## 2020-08-18 RX ADMIN — Medication 10 ML: at 22:06

## 2020-08-18 RX ADMIN — FOLIC ACID 2 MG: 1 TABLET ORAL at 10:02

## 2020-08-18 RX ADMIN — KETOROLAC TROMETHAMINE 30 MG: 30 INJECTION, SOLUTION INTRAMUSCULAR at 18:14

## 2020-08-18 RX ADMIN — HYDROXYUREA 1000 MG: 500 CAPSULE ORAL at 11:53

## 2020-08-18 RX ADMIN — HYDROXYUREA 1000 MG: 500 CAPSULE ORAL at 23:08

## 2020-08-18 RX ADMIN — DIPHENHYDRAMINE HCL 25 MG: 25 TABLET ORAL at 19:33

## 2020-08-18 RX ADMIN — IOHEXOL 50 ML: 240 INJECTION, SOLUTION INTRATHECAL; INTRAVASCULAR; INTRAVENOUS; ORAL at 11:41

## 2020-08-18 RX ADMIN — KETOROLAC TROMETHAMINE 30 MG: 30 INJECTION, SOLUTION INTRAMUSCULAR at 04:04

## 2020-08-18 RX ADMIN — Medication 10 ML: at 10:16

## 2020-08-18 RX ADMIN — METHADONE HYDROCHLORIDE 10 MG: 10 TABLET ORAL at 14:45

## 2020-08-18 RX ADMIN — MIDAZOLAM HYDROCHLORIDE 0.5 MG: 5 INJECTION, SOLUTION INTRAMUSCULAR; INTRAVENOUS at 13:44

## 2020-08-18 RX ADMIN — SODIUM CHLORIDE: 9 INJECTION, SOLUTION INTRAVENOUS at 04:04

## 2020-08-18 RX ADMIN — KETOROLAC TROMETHAMINE 30 MG: 30 INJECTION, SOLUTION INTRAMUSCULAR at 10:06

## 2020-08-18 RX ADMIN — METHADONE HYDROCHLORIDE 10 MG: 10 TABLET ORAL at 10:03

## 2020-08-18 RX ADMIN — SODIUM CHLORIDE: 9 INJECTION, SOLUTION INTRAVENOUS at 19:34

## 2020-08-18 RX ADMIN — SODIUM CHLORIDE: 900 INJECTION, SOLUTION INTRAVENOUS at 11:55

## 2020-08-18 RX ADMIN — BACITRACIN ZINC, NEOMYCIN SULFATE, AND POLYMYXIN B SULFATE: 400; 3.5; 5 OINTMENT TOPICAL at 22:03

## 2020-08-18 ASSESSMENT — PAIN DESCRIPTION - ORIENTATION: ORIENTATION: LEFT

## 2020-08-18 ASSESSMENT — PAIN DESCRIPTION - LOCATION: LOCATION: ARM

## 2020-08-18 ASSESSMENT — PAIN DESCRIPTION - PAIN TYPE: TYPE: ACUTE PAIN

## 2020-08-18 ASSESSMENT — PAIN SCALES - GENERAL
PAINLEVEL_OUTOF10: 8
PAINLEVEL_OUTOF10: 10
PAINLEVEL_OUTOF10: 5
PAINLEVEL_OUTOF10: 9

## 2020-08-18 NOTE — PLAN OF CARE
Problem: Pain:  Goal: Pain level will decrease  Description: Pain level will decrease  8/18/2020 0024 by Deloris Wheeler RN  Outcome: Ongoing   Pt reports pain 8/10. Pt has PCA pump and has been educated on use. Pt VS monitored Q4. Will continue to monitor.

## 2020-08-18 NOTE — PROGRESS NOTES
Hospitalist Progress Note      PCP: Joesph Womack MD    Date of Admission: 8/6/2020    Chief Complaint:    Sickle cell crisis    Hospital Course:     Patient is known to have sickle cell anemia. Admitted with sickle cell crisis. Hematology also consulted. Infectious disease consulted  Transfusion performed yesterday. Hemoglobin better today. Remains afebrile. Not on antibiotics. Complains of left arm pain. Subjective:    Left elbow pain is better.   No chest pain, no shortness of breath, no nausea, no vomiting      Medications:  Reviewed    Infusion Medications    HYDROmorphone 0  (08/17/20 1122)    sodium chloride 100 mL/hr at 08/18/20 0404     Scheduled Medications    IV infusion builder   Intravenous Once    diphenhydrAMINE  25 mg Oral Once    acetaminophen  650 mg Oral Once    diphenhydrAMINE  50 mg Intravenous Once    sodium chloride  20 mL Intravenous Once    folic acid  2 mg Oral Daily    alteplase  2 mg Intracatheter Once    hydroxyurea  1,000 mg Oral BID    ketorolac  30 mg Intravenous Q6H    lidocaine  1 patch Transdermal Daily    LORazepam  1 mg Intravenous Once    sodium chloride flush  10 mL Intravenous 2 times per day    neomycin-bacitracin-polymyxin   Topical BID    sodium chloride  20 mL Intravenous Once    methadone  10 mg Oral TID    sodium chloride flush  10 mL Intravenous 2 times per day    enoxaparin  40 mg Subcutaneous Daily     PRN Meds: ondansetron, naloxone, sodium chloride flush, oxyCODONE, diphenhydrAMINE, sodium chloride flush, acetaminophen **OR** acetaminophen      Intake/Output Summary (Last 24 hours) at 8/18/2020 1418  Last data filed at 8/18/2020 0600  Gross per 24 hour   Intake 1530.25 ml   Output 1300 ml   Net 230.25 ml       Physical Exam Performed:    /76   Pulse 81   Temp 98.2 °F (36.8 °C) (Oral)   Resp 12   Ht 5' 8\" (1.727 m)   Wt 175 lb 12.8 oz (79.7 kg)   SpO2 100%   BMI 26.73 kg/m²     Physical Examination:   General appearance - alert, well appearing, and in no distress  Mental status - alert, oriented to person, place, and time  Eyes - pupils equal and reactive, extraocular eye movements intact  Mouth - mucous membranes moist, pharynx normal without lesions  Neck - supple, no significant adenopathy  Chest - clear to auscultation, no wheezes, rales or rhonchi, symmetric air entry  Heart - normal rate, regular rhythm, normal S1, S2, no murmurs, rubs, clicks or gallops  Abdomen - soft, nontender, nondistended, no masses or organomegaly  Neurological - alert, oriented, normal speech, no focal findings or movement disorder noted. Left arm distal and proximal muscles are tender to touch. Muscle strength is reduced because of pain. Musculoskeletal - no joint tenderness, deformity or swelling  Extremities - peripheral pulses normal, no pedal edema, no clubbing or cyanosis  Skin - normal coloration and turgor, no rashes, no suspicious skin lesions noted        Labs:   Recent Labs     08/16/20  0535 08/17/20  0530 08/17/20  1119 08/18/20  0545   WBC 14.5* 12.4*  --  11.0   HGB 7.5* 8.3*  --  7.5*   HCT 23.1* 24.6* 26.2* 22.1*    544*  --  602*     Recent Labs     08/16/20  0535 08/17/20  0530 08/18/20  0545    140 140   K 4.0 4.1 3.7    103 108   CO2 26 27 26   BUN 6* 7 7   CREATININE 0.6* 0.6* <0.5*   CALCIUM 9.6 9.3 8.5     Recent Labs     08/17/20  1119   AST 26   ALT 19   BILIDIR 0.5*   BILITOT 2.6*   ALKPHOS 72     No results for input(s): INR in the last 72 hours. No results for input(s): [place] in the last 72 hours. Urinalysis:      Lab Results   Component Value Date    NITRU Negative 08/14/2020    WBCUA None seen 08/14/2020    RBCUA 0-2 08/14/2020    BLOODU SMALL 08/14/2020    SPECGRAV 1.015 08/14/2020    GLUCOSEU Negative 08/14/2020       Radiology:  VL Extremity Venous Bilateral   Final Result      CTA HEAD W WO CONTRAST   Final Result   1. No CT evidence of an acute infarct.    2. Unremarkable CTA of the head. 3.  The findings were sent to the Radiology Results [address] at   5:53 pm on 8/17/2020to be communicated to a licensed caregiver. IR PICC WO SQ PORT/PUMP > 5 YEARS   Final Result   Successful placement of PICC line. XR CHEST (2 VW)   Final Result   No evidence of acute cardiopulmonary disease. XR ELBOW LEFT (MIN 3 VIEWS)   Final Result   No acute osseous abnormality left humerus      No acute osseous abnormality left elbow         XR HUMERUS LEFT (MIN 2 VIEWS)   Final Result   No acute osseous abnormality left humerus      No acute osseous abnormality left elbow         XR KNEE LEFT (3 VIEWS)   Final Result   No acute osseous abnormality of the left knee. XR KNEE RIGHT (1-2 VIEWS)   Final Result   No acute abnormality of either knee. No significant arthritic change. XR KNEE LEFT (1-2 VIEWS)   Final Result   No acute abnormality of either knee. No significant arthritic change. CT CHEST PULMONARY EMBOLISM W CONTRAST   Final Result   No evidence of pulmonary embolism or acute pulmonary abnormality. XR CHEST PORTABLE   Final Result   No convincing acute cardiopulmonary abnormality. CT ABDOMEN PELVIS W IV CONTRAST Additional Contrast? Oral    (Results Pending)   MRI HUMERUS LEFT WO CONTRAST    (Results Pending)   IR NON TUNNELED CATH WO PORT REPLACEMENT    (Results Pending)           Assessment/Plan:    Active Hospital Problems    Diagnosis    Sepsis ([address] [A41.9]    Sickle cell crisis ([address] [D57.00]       PLAN    SIRS without infection, present on arrival  Currently afebrile   Continue IV hydration  No evidence of infection. Off antibiotics. Sickle cell crisis with pain and anemia  Opioid regimen started  Hematology has taken over as primary  Plan for apheresis. Leukocytosis  Probably nonspecific. No growth on cultures. WBC improving.      Left arm pain and tenderness  Pending MRI to eval for AVN    Discussed with the patient, questions answered    DVT Prophylaxis: Lovenox  Diet: DIET GENERAL;  Code Status: Full Code    PT/OT Eval Status: not indicated    Dispo -per primary team.     CARMELITA Garzon - CNP

## 2020-08-18 NOTE — PROGRESS NOTES
ONCOLOGY HEMATOLOGY CARE PROGRESS NOTE      SUBJECTIVE:     CTA head negative. Dopplers negative. Still with a large amount of pain. Mag is 1.5   Hgb 7.5- despite unit of blood yesterday. . Hapto less than 10 and retic is 7. Most pain located to the L arm. ROS:   The remaining 10 point review of symptoms is unremarkable. OBJECTIVE        Physical    VITALS:  /68   Pulse 80   Temp 98.2 °F (36.8 °C) (Oral)   Resp 16   Ht 5' 8\" (1.727 m)   Wt 175 lb 12.8 oz (79.7 kg)   SpO2 100%   BMI 26.73 kg/m²   TEMPERATURE:  Current - Temp: 98.2 °F (36.8 °C); Max - Temp  Av.3 °F (36.8 °C)  Min: 97.9 °F (36.6 °C)  Max: 99 °F (37.2 °C)  PULSE OXIMETRY RANGE: SpO2  Av.5 %  Min: 96 %  Max: 100 %  24HR INTAKE/OUTPUT:      Intake/Output Summary (Last 24 hours) at 2020 1039  Last data filed at 2020 0600  Gross per 24 hour   Intake 1890.25 ml   Output 1825 ml   Net 65.25 ml       CONSTITUTIONAL:  awake, alert, cooperative, no apparent distress, HEENT oral pharynx , no scleral icterus. Likely small skin infection under the right orbit. HEMATOLOGIC/LYMPHATICS:  no cervical lymphadenopathy, no supraclavicular lymphadenopathy, no axillary lymphadenopathy and no inguinal lymphadenopathy  LUNGS:  No increased work of breathing, good air exchange, clear to auscultation bilaterally, no crackles or wheezing  CARDIOVASCULAR:  , regular rate and rhythm, normal S1 and S2, no S3 or S4, and no murmur noted  ABDOMEN:  No scars, normal bowel sounds, soft, non-distended, non-tender, no masses palpated, no hepatosplenomegally  MUSCULOSKELETAL:  There is no redness, warmth, or swelling of the joints. EXTREMETIES: No clubbing cynosis or edema  NEUROLOGIC:  L hand  strength is 3/5 with 3/5 MS with abduction and adduction.  Pupils equal and reactive to light bilaterally, smile is symmetrical, no tongue deviation   SKIN:  no bruising or bleeding      Data Graeme  - Await apheresis today or tomorrow- needs line placed   - Likely here another 2-3 days     Phoenix Boucher, CNP   May be reached through [address] [place] [address]

## 2020-08-18 NOTE — PRE SEDATION
Sedation Pre-Procedure Note    Patient Name: [facility]   YOB: 2000  Room/Bed: 9942/2301-18  Medical Record Number: [id number]  Date: 8/18/2020   Time: 1:30 PM     Indication:  Sickle cell crisis, need for apheresis    Consent: I have discussed with the patient and/or the patient representative the indication, alternatives, and the possible risks and/or complications of the planned procedure and the anesthesia methods. The patient and/or patient representative appear to understand and agree to proceed. Vital Signs:   Vitals:    08/18/20 1354   BP: 120/76   Pulse: 81   Resp: 12   Temp:    SpO2: 100%       Past Medical History:   has a past medical history of Asthma, Enlarged heart, and Sickle cell anemia ([address] Past Surgical History:   has a past surgical history that includes Splenectomy. Medications:   Scheduled Meds:    IV infusion builder   Intravenous Once    diphenhydrAMINE  25 mg Oral Once    acetaminophen  650 mg Oral Once    diphenhydrAMINE  50 mg Intravenous Once    sodium chloride  20 mL Intravenous Once    folic acid  2 mg Oral Daily    alteplase  2 mg Intracatheter Once    hydroxyurea  1,000 mg Oral BID    ketorolac  30 mg Intravenous Q6H    lidocaine  1 patch Transdermal Daily    LORazepam  1 mg Intravenous Once    sodium chloride flush  10 mL Intravenous 2 times per day    neomycin-bacitracin-polymyxin   Topical BID    sodium chloride  20 mL Intravenous Once    methadone  10 mg Oral TID    sodium chloride flush  10 mL Intravenous 2 times per day    enoxaparin  40 mg Subcutaneous Daily     Continuous Infusions:    HYDROmorphone 0  (08/17/20 1122)    sodium chloride 100 mL/hr at 08/18/20 0404     PRN Meds: ondansetron, naloxone, sodium chloride flush, oxyCODONE, diphenhydrAMINE, sodium chloride flush, acetaminophen **OR** acetaminophen  Home Meds:   Prior to Admission medications    Medication Sig Start Date End Date Taking?  Authorizing Provider   folic acid (FOLVITE) 1 MG tablet Take 1 mg by mouth daily    Historical Provider, MD   hydroxyurea (HYDREA) 500 MG chemo capsule Take by mouth daily Unsure of dose    Historical Provider, MD   methadone (DOLOPHINE) 5 MG tablet Take 5 mg by mouth every 4 hours as needed for Pain. Historical Provider, MD     Coumadin Use Last 7 Days:  no  Antiplatelet drug therapy use last 7 days: no  Other anticoagulant use last 7 days: no  Additional Medication Information:  *      Pre-Sedation Documentation and Exam:   I have reviewed the patient's history and review of systems.     Mallampati Airway Assessment:  normal neck range of motion    Prior History of Anesthesia Complications:   none    ASA Classification:  Class 2 - A normal healthy patient with mild systemic disease    Sedation/ Anesthesia Plan:   intravenous sedation    Medications Planned:   fentanyl intravenously and morphine intravenously    Patient is an appropriate candidate for plan of sedation: yes    Electronically signed by Giana Villarreal MD on 8/18/2020 at 2:09 PM

## 2020-08-18 NOTE — PROGRESS NOTES
Pt assessment completed and charted. VSS. Pt a/o. Pt reports pain 8/10, pt educated on PCA pump. Pt given hot packs to apply for heat therapy. Pt complains of most pain in L arm and wrist.  Pt denies any other needs at this time. Pt calls out appropriately. Will continue to monitor. Pt is a fall risk;  -Bed in lowest position and wheels locked. -Call light within reach.   -Bedside table within reach.   -Non-skid footwear in place.

## 2020-08-18 NOTE — SEDATION DOCUMENTATION
12.5 Irish 16 cm temporary dialysis catheter placed without difficulty. Patient tolerated procedure well. Returned to prealdrete score. Report called to Jovanny Ribeiro RN C3. Returned to C3 per bed.

## 2020-08-18 NOTE — CARE COORDINATION
Chart reviewed day 12. Plan for temp dialysis cath placement for Apheresis. Likely here 2-3 more days. IPTA will likely have no DCP needs.  Jerome Da Silva RN

## 2020-08-19 ENCOUNTER — APPOINTMENT (OUTPATIENT)
Dept: MRI IMAGING | Age: 20
DRG: 812 | End: 2020-08-19
Payer: COMMERCIAL

## 2020-08-19 LAB
ANION GAP SERPL CALCULATED.3IONS-SCNC: 8 MMOL/L (ref 3–16)
BASOPHILS ABSOLUTE: 0 K/UL (ref 0–0.2)
BASOPHILS RELATIVE PERCENT: 0.3 %
BUN BLDV-MCNC: 12 MG/DL (ref 7–20)
CALCIUM SERPL-MCNC: 9 MG/DL (ref 8.3–10.6)
CHLORIDE BLD-SCNC: 103 MMOL/L (ref 99–110)
CO2: 27 MMOL/L (ref 21–32)
CREAT SERPL-MCNC: 0.6 MG/DL (ref 0.9–1.3)
EOSINOPHILS ABSOLUTE: 0.1 K/UL (ref 0–0.6)
EOSINOPHILS RELATIVE PERCENT: 1.3 %
FERRITIN: 773.4 NG/ML (ref 30–400)
FOLATE: 9.31 NG/ML (ref 4.78–24.2)
GFR AFRICAN AMERICAN: >60
GFR NON-AFRICAN AMERICAN: >60
GLUCOSE BLD-MCNC: 113 MG/DL (ref 70–99)
HCT VFR BLD CALC: 29.5 % (ref 40.5–52.5)
HEMOGLOBIN: 10.2 G/DL (ref 13.5–17.5)
LYMPHOCYTES ABSOLUTE: 1.6 K/UL (ref 1–5.1)
LYMPHOCYTES RELATIVE PERCENT: 14.2 %
MCH RBC QN AUTO: 32.7 PG (ref 26–34)
MCHC RBC AUTO-ENTMCNC: 34.7 G/DL (ref 31–36)
MCV RBC AUTO: 94.1 FL (ref 80–100)
MONOCYTES ABSOLUTE: 1.5 K/UL (ref 0–1.3)
MONOCYTES RELATIVE PERCENT: 13.3 %
NEUTROPHILS ABSOLUTE: 7.9 K/UL (ref 1.7–7.7)
NEUTROPHILS RELATIVE PERCENT: 70.9 %
PDW BLD-RTO: 18.7 % (ref 12.4–15.4)
PLATELET # BLD: 493 K/UL (ref 135–450)
PMV BLD AUTO: 9 FL (ref 5–10.5)
POTASSIUM REFLEX MAGNESIUM: 4.3 MMOL/L (ref 3.5–5.1)
RBC # BLD: 3.14 M/UL (ref 4.2–5.9)
SODIUM BLD-SCNC: 138 MMOL/L (ref 136–145)
VITAMIN B-12: 281 PG/ML (ref 211–911)
WBC # BLD: 11.2 K/UL (ref 4–11)

## 2020-08-19 PROCEDURE — 2580000003 HC RX 258: Performed by: FAMILY MEDICINE

## 2020-08-19 PROCEDURE — 1200000000 HC SEMI PRIVATE

## 2020-08-19 PROCEDURE — 6370000000 HC RX 637 (ALT 250 FOR IP): Performed by: INTERNAL MEDICINE

## 2020-08-19 PROCEDURE — 6360000002 HC RX W HCPCS: Performed by: NURSE PRACTITIONER

## 2020-08-19 PROCEDURE — 6370000000 HC RX 637 (ALT 250 FOR IP): Performed by: NURSE PRACTITIONER

## 2020-08-19 PROCEDURE — 36592 COLLECT BLOOD FROM PICC: CPT

## 2020-08-19 PROCEDURE — 85025 COMPLETE CBC W/AUTO DIFF WBC: CPT

## 2020-08-19 PROCEDURE — 94770 HC ETCO2 MONITOR DAILY: CPT

## 2020-08-19 PROCEDURE — 80048 BASIC METABOLIC PNL TOTAL CA: CPT

## 2020-08-19 PROCEDURE — 2700000000 HC OXYGEN THERAPY PER DAY

## 2020-08-19 PROCEDURE — 2580000003 HC RX 258: Performed by: NURSE PRACTITIONER

## 2020-08-19 PROCEDURE — 73218 MRI UPPER EXTREMITY W/O DYE: CPT

## 2020-08-19 PROCEDURE — 94761 N-INVAS EAR/PLS OXIMETRY MLT: CPT

## 2020-08-19 RX ORDER — SENNA AND DOCUSATE SODIUM 50; 8.6 MG/1; MG/1
2 TABLET, FILM COATED ORAL 2 TIMES DAILY
Status: DISCONTINUED | OUTPATIENT
Start: 2020-08-19 | End: 2020-08-25 | Stop reason: SDUPTHER

## 2020-08-19 RX ADMIN — METHADONE HYDROCHLORIDE 10 MG: 10 TABLET ORAL at 14:46

## 2020-08-19 RX ADMIN — HYDROXYUREA 1000 MG: 500 CAPSULE ORAL at 21:36

## 2020-08-19 RX ADMIN — BACITRACIN ZINC, NEOMYCIN SULFATE, AND POLYMYXIN B SULFATE: 400; 3.5; 5 OINTMENT TOPICAL at 21:47

## 2020-08-19 RX ADMIN — BACITRACIN ZINC, NEOMYCIN SULFATE, AND POLYMYXIN B SULFATE: 400; 3.5; 5 OINTMENT TOPICAL at 10:30

## 2020-08-19 RX ADMIN — HYDROXYUREA 1000 MG: 500 CAPSULE ORAL at 09:50

## 2020-08-19 RX ADMIN — Medication 10 ML: at 21:36

## 2020-08-19 RX ADMIN — Medication: at 00:01

## 2020-08-19 RX ADMIN — METHADONE HYDROCHLORIDE 10 MG: 10 TABLET ORAL at 21:36

## 2020-08-19 RX ADMIN — DOCUSATE SODIUM 50 MG AND SENNOSIDES 8.6 MG 2 TABLET: 8.6; 5 TABLET, FILM COATED ORAL at 21:36

## 2020-08-19 RX ADMIN — KETOROLAC TROMETHAMINE 30 MG: 30 INJECTION, SOLUTION INTRAMUSCULAR at 05:24

## 2020-08-19 RX ADMIN — SODIUM CHLORIDE: 9 INJECTION, SOLUTION INTRAVENOUS at 14:50

## 2020-08-19 RX ADMIN — Medication 10 ML: at 09:45

## 2020-08-19 RX ADMIN — FOLIC ACID 2 MG: 1 TABLET ORAL at 09:46

## 2020-08-19 RX ADMIN — METHADONE HYDROCHLORIDE 10 MG: 10 TABLET ORAL at 09:46

## 2020-08-19 RX ADMIN — SODIUM CHLORIDE: 9 INJECTION, SOLUTION INTRAVENOUS at 05:21

## 2020-08-19 ASSESSMENT — PAIN DESCRIPTION - LOCATION
LOCATION: ARM
LOCATION: ARM

## 2020-08-19 ASSESSMENT — PAIN SCALES - GENERAL
PAINLEVEL_OUTOF10: 9
PAINLEVEL_OUTOF10: 8
PAINLEVEL_OUTOF10: 9
PAINLEVEL_OUTOF10: 8

## 2020-08-19 ASSESSMENT — PAIN DESCRIPTION - ONSET: ONSET: ON-GOING

## 2020-08-19 ASSESSMENT — PAIN DESCRIPTION - ORIENTATION
ORIENTATION: LEFT
ORIENTATION: LEFT

## 2020-08-19 ASSESSMENT — PAIN DESCRIPTION - PAIN TYPE
TYPE: ACUTE PAIN
TYPE: ACUTE PAIN

## 2020-08-19 ASSESSMENT — PAIN DESCRIPTION - DESCRIPTORS: DESCRIPTORS: ACHING;CONSTANT

## 2020-08-19 ASSESSMENT — PAIN DESCRIPTION - FREQUENCY: FREQUENCY: CONTINUOUS

## 2020-08-19 NOTE — PROGRESS NOTES
Assessment as charted. A/O x 4. Eating [employer] that he had delivered via [employer], tolerating PO intake well. Denies nausea. States pain is 9/10 in LUE. Dilaudid PCA pump as ordered. Pt resting in bed, no acute distress noted. Nonskid footwear on. Bed in low position, wheels locked, SR x 2, call light and bedside table within reach. Will monitor.     Electronically signed by Selvin Bond RN on 8/19/2020 at 12:30 AM

## 2020-08-19 NOTE — PROGRESS NOTES
ONCOLOGY HEMATOLOGY CARE PROGRESS NOTE      SUBJECTIVE:     The patient states he feels a little better this morning, but he is still having problems with the left elbow. There is no chest pain. There is no fever, chills or shortness of breath. ROS:   The remaining 10 point review of symptoms is unremarkable. OBJECTIVE        Physical    VITALS:  BP (!) 105/58   Pulse 82   Temp 98.9 °F (37.2 °C) (Oral)   Resp 20   Ht 5' 8\" (1.727 m)   Wt 179 lb 3.7 oz (81.3 kg)   SpO2 99%   BMI 27.25 kg/m²   TEMPERATURE:  Current - Temp: 98.9 °F (37.2 °C); Max - Temp  Av.4 °F (36.9 °C)  Min: 97.6 °F (36.4 °C)  Max: 99 °F (37.2 °C)  PULSE OXIMETRY RANGE: SpO2  Av.6 %  Min: 98 %  Max: 100 %  24HR INTAKE/OUTPUT:      Intake/Output Summary (Last 24 hours) at 2020 1124  Last data filed at 2020 0912  Gross per 24 hour   Intake 4331.15 ml   Output 2875 ml   Net 1456.15 ml       CONSTITUTIONAL:  awake, alert, cooperative, no apparent distress, HEENT oral pharynx , no scleral icterus. Likely small skin infection under the right orbit. HEMATOLOGIC/LYMPHATICS:  no cervical lymphadenopathy, no supraclavicular lymphadenopathy, no axillary lymphadenopathy and no inguinal lymphadenopathy  LUNGS:  No increased work of breathing, good air exchange, clear to auscultation bilaterally, no crackles or wheezing  CARDIOVASCULAR:  , regular rate and rhythm, normal S1 and S2, no S3 or S4, and no murmur noted  ABDOMEN:  No scars, normal bowel sounds, soft, non-distended, non-tender, no masses palpated, no hepatosplenomegally  MUSCULOSKELETAL:  There is no redness, warmth, or swelling of the joints. EXTREMETIES: No clubbing cynosis or edema  NEUROLOGIC:  L hand  strength is 3/5 with 3/5 MS with abduction and adduction.  Pupils equal and reactive to light bilaterally, smile is symmetrical, no tongue deviation   SKIN:  no bruising or bleeding      Data      Recent Labs 08/17/20  0530 08/17/20  1119 08/18/20  0545 08/19/20  0730   WBC 12.4*  --  11.0 11.2*   HGB 8.3*  --  7.5* 10.2*   HCT 24.6* 26.2* 22.1* 29.5*   *  --  602* 493*   .7*  --  105.7* 94.1        Recent Labs     08/17/20  0530 08/18/20  0545 08/19/20  0730    140 138   K 4.1 3.7 4.3    108 103   CO2 27 26 27   BUN 7 7 12   CREATININE 0.6* <0.5* 0.6*     Recent Labs     08/17/20  1119   AST 26   ALT 19   BILIDIR 0.5*   BILITOT 2.6*   ALKPHOS 72       Magnesium:    Lab Results   Component Value Date    MG 1.50 08/17/2020    MG 1.70 08/15/2020    MG 2.10 07/30/2020         Problem List  Patient Active Problem List   Diagnosis    Sick[facility] pain crisis ([facility]    Asthma    Sickle cell crisis ([facility]    Sepsis ([facility] [address] [facility]       ASSESSMENT AND PLAN:    Sickle cell crisis:  -No evidence of acute chest pain syndrome- no stroke.    -This is minimally better  -We will try blood products, IV fluids, Hydrea and analgesia  -There is no definite precipitating cause  -He will have a red cell exchange today    Pain:  -I spent a good deal of time explaining to the patient we want to get him as pain-free as possible, but we cannot have respiratory depression either  -He appears to be understanding    Access  - PICC line  - temp dialysis line today for apheresis     Pain:  - Cont Methadone and PCA at current settings.   -I do not think that we can increase his pain medications for concern of respiratory depression    Infectious disease:  -COVID testing negative     L arm weakness  - CTA head negative for stroke  - Will get MRI L arm to eval for AVN   -This is pending    Fever  - pan cx, chest xray -Both are normal  - procal Mildly elevated 0.51 lactic acid is normal  - cont Rocephin and Zithromax - blood cx NGTD- stopped 8-17   -His fever has subsided and it may be due to his crisis    Hypomagnesemia  - Mag is 1.5- 2 grams IV ordered       ONCOLOGIC DISPOSITION: TBD     -Once we can get his IV pain medication stopped- weaning- concerned for dependence.    -He will follow-up with Dr. Rolando Kent  - Await apheresis today or tomorrow- needs line placed   - Likely here another 2-3 days   -I will discuss Carol See and other therapeutic measures with her to see if we can help diminish his sickle cell crisis    Elza Cai MD  May be reached through [address] [place] [address]

## 2020-08-19 NOTE — PLAN OF CARE
Problem: Falls - Risk of:  Goal: Will remain free from falls  Description: Will remain free from falls  Outcome: Ongoing  Goal: Absence of physical injury  Description: Absence of physical injury  Outcome: Ongoing     Pt remains free from accidental injury or fall. Pt ambulates self in room without difficulty. Will monitor.

## 2020-08-19 NOTE — CARE COORDINATION
Chart reviewed. Dialysis cath placed yesterday and [facility] exchange last evening. Continues with pain and PCA. Will need weaned. IPTA, lives with mother.  Terrell Garcia RN

## 2020-08-19 NOTE — PROGRESS NOTES
Hospitalist Progress Note      PCP: Jony Mendoza MD    Date of Admission: 8/6/2020    Chief Complaint: Sickle cell crisis    Hospital Course:  23year old male, with a PMH of sickle cell. Admitted with sickle cell crisis. Hematology consulted and following. Subjective: Still with pain \"all over\". On PCA and methadone. MRI left arm pending. Afebrile. Medications:  Reviewed    Infusion Medications    HYDROmorphone 0  (08/17/20 1122)    sodium chloride 100 mL/hr at 08/19/20 6101     Scheduled Medications    sennosides-docusate sodium  2 tablet Oral BID    IV infusion builder   Intravenous Once    diphenhydrAMINE  50 mg Intravenous Once    sodium chloride  20 mL Intravenous Once    folic acid  2 mg Oral Daily    alteplase  2 mg Intracatheter Once    hydroxyurea  1,000 mg Oral BID    lidocaine  1 patch Transdermal Daily    LORazepam  1 mg Intravenous Once    sodium chloride flush  10 mL Intravenous 2 times per day    neomycin-bacitracin-polymyxin   Topical BID    sodium chloride  20 mL Intravenous Once    methadone  10 mg Oral TID    sodium chloride flush  10 mL Intravenous 2 times per day    enoxaparin  40 mg Subcutaneous Daily     PRN Meds: ondansetron, naloxone, sodium chloride flush, oxyCODONE, diphenhydrAMINE, sodium chloride flush, acetaminophen **OR** acetaminophen      Intake/Output Summary (Last 24 hours) at 8/19/2020 1117  Last data filed at 8/19/2020 0912  Gross per 24 hour   Intake 4331.15 ml   Output 2875 ml   Net 1456.15 ml       Physical Exam Performed:    BP (!) 105/58   Pulse 82   Temp 98.9 °F (37.2 °C) (Oral)   Resp 20   Ht 5' 8\" (1.727 m)   Wt 179 lb 3.7 oz (81.3 kg)   SpO2 99%   BMI 27.25 kg/m²     General appearance: No apparent distress, appears stated age and cooperative. HEENT: Pupils equal, round, and reactive to light. Conjunctivae/corneas clear. Neck: Supple, with full range of motion. No jugular venous distention.  Trachea midline. Respiratory:  Normal respiratory effort. Clear to auscultation, bilaterally without Rales/Wheezes/Rhonchi. Cardiovascular: Regular rate and rhythm with normal S1/S2 without murmurs, rubs or gallops. Abdomen: Soft, non-tender, non-distended with normal bowel sounds. Musculoskeletal: No clubbing, cyanosis or edema bilaterally. Full range of motion without deformity. Skin: Skin color, texture, turgor normal.  No rashes or lesions. Neurologic:  Neurovascularly intact without any focal sensory/motor deficits. Cranial nerves: II-XII intact, grossly non-focal.  Psychiatric: Alert and oriented, thought content appropriate, normal insight  Capillary Refill: Brisk,< 3 seconds   Peripheral Pulses: +2 palpable, equal bilaterally       Labs:   Recent Labs     08/17/20  0530 08/17/20  1119 08/18/20  0545 08/19/20  0730   WBC 12.4*  --  11.0 11.2*   HGB 8.3*  --  7.5* 10.2*   HCT 24.6* 26.2* 22.1* 29.5*   *  --  602* 493*     Recent Labs     08/17/20  0530 08/18/20  0545 08/19/20  0730    140 138   K 4.1 3.7 4.3    108 103   CO2 27 26 27   BUN 7 7 12   CREATININE 0.6* <0.5* 0.6*   CALCIUM 9.3 8.5 9.0     Recent Labs     08/17/20  1119   AST 26   ALT 19   BILIDIR 0.5*   BILITOT 2.6*   ALKPHOS 72     No results for input(s): INR in the last 72 hours. No results for input(s): Kristi Karens in the last 72 hours. Urinalysis:      Lab Results   Component Value Date    NITRU Negative 08/14/2020    WBCUA None seen 08/14/2020    RBCUA 0-2 08/14/2020    BLOODU SMALL 08/14/2020    SPECGRAV 1.015 08/14/2020    GLUCOSEU Negative 08/14/2020       Radiology:  CT ABDOMEN PELVIS W IV CONTRAST Additional Contrast? Oral   Final Result   1. No acute process within the abdomen or pelvis. 2. No CT evidence of organ infarction. 3. Mild hepatomegaly. 4. Patient status post splenectomy. 5. Large amount of stool throughout the colon, suggestive of constipation.          IR NON TUNNELED CATH WO PORT REPLACEMENT   Final Result   Successful ultrasound and fluoroscopy guided non-tunneled right IJ temporary   dialysis catheter placement. VL Extremity Venous Bilateral   Final Result      CTA HEAD W WO CONTRAST   Final Result   1. No CT evidence of an acute infarct. 2. Unremarkable CTA of the head. 3.  The findings were sent to the Radiology Results [address] at   5:53 pm on 8/17/2020to be communicated to a licensed caregiver. IR PICC WO SQ PORT/PUMP > 5 YEARS   Final Result   Successful placement of PICC line. XR CHEST (2 VW)   Final Result   No evidence of acute cardiopulmonary disease. XR ELBOW LEFT (MIN 3 VIEWS)   Final Result   No acute osseous abnormality left humerus      No acute osseous abnormality left elbow         XR HUMERUS LEFT (MIN 2 VIEWS)   Final Result   No acute osseous abnormality left humerus      No acute osseous abnormality left elbow         XR KNEE LEFT (3 VIEWS)   Final Result   No acute osseous abnormality of the left knee. XR KNEE RIGHT (1-2 VIEWS)   Final Result   No acute abnormality of either knee. No significant arthritic change. XR KNEE LEFT (1-2 VIEWS)   Final Result   No acute abnormality of either knee. No significant arthritic change. CT CHEST PULMONARY EMBOLISM W CONTRAST   Final Result   No evidence of pulmonary embolism or acute pulmonary abnormality. XR CHEST PORTABLE   Final Result   No convincing acute cardiopulmonary abnormality. MRI HUMERUS LEFT WO CONTRAST    (Results Pending)           Assessment/Plan:    Active Hospital Problems    Diagnosis    Sepsis ([address] [A41.9]    Sickle cell crisis ([address] [D57.00]     SIRS without infection, present on arrival  Currently afebrile   Continue IV hydration  No evidence of infection. Off antibiotics. ID consulted and now signed off.     CXR and UA negative,  Checking for AVN of left arm - MRI pending.     Sickle cell crisis with pain and anemia  Opioid regimen started  Hematology has taken over as primary  S/p apheresis 8/18. Follow CBC.     Leukocytosis   Probably nonspecific.   Blood cx NGTD.     Left arm pain and tenderness  Pending MRI to eval for AVN      DVT Prophylaxis: Lovenox  Diet: DIET GENERAL;  Code Status: Full Code    PT/OT Eval Status: not indicated    Dispo - per primary team    Sam Bowser, CARMELITA - CNP

## 2020-08-20 LAB
ANION GAP SERPL CALCULATED.3IONS-SCNC: 9 MMOL/L (ref 3–16)
BASOPHILS ABSOLUTE: 0.1 K/UL (ref 0–0.2)
BASOPHILS RELATIVE PERCENT: 0.6 %
BUN BLDV-MCNC: 8 MG/DL (ref 7–20)
CALCIUM SERPL-MCNC: 9.4 MG/DL (ref 8.3–10.6)
CHLORIDE BLD-SCNC: 99 MMOL/L (ref 99–110)
CO2: 27 MMOL/L (ref 21–32)
CREAT SERPL-MCNC: 0.6 MG/DL (ref 0.9–1.3)
EOSINOPHILS ABSOLUTE: 0.1 K/UL (ref 0–0.6)
EOSINOPHILS RELATIVE PERCENT: 0.9 %
GFR AFRICAN AMERICAN: >60
GFR NON-AFRICAN AMERICAN: >60
GLUCOSE BLD-MCNC: 94 MG/DL (ref 70–99)
HCT VFR BLD CALC: 28.7 % (ref 40.5–52.5)
HEMOGLOBIN: 9.9 G/DL (ref 13.5–17.5)
LYMPHOCYTES ABSOLUTE: 2.1 K/UL (ref 1–5.1)
LYMPHOCYTES RELATIVE PERCENT: 16.9 %
MCH RBC QN AUTO: 33.3 PG (ref 26–34)
MCHC RBC AUTO-ENTMCNC: 34.5 G/DL (ref 31–36)
MCV RBC AUTO: 96.6 FL (ref 80–100)
MONOCYTES ABSOLUTE: 1.3 K/UL (ref 0–1.3)
MONOCYTES RELATIVE PERCENT: 10.5 %
NEUTROPHILS ABSOLUTE: 8.7 K/UL (ref 1.7–7.7)
NEUTROPHILS RELATIVE PERCENT: 71.1 %
PDW BLD-RTO: 19.2 % (ref 12.4–15.4)
PLATELET # BLD: 607 K/UL (ref 135–450)
PLATELET SLIDE REVIEW: ABNORMAL
PMV BLD AUTO: 9.3 FL (ref 5–10.5)
POTASSIUM REFLEX MAGNESIUM: 4.1 MMOL/L (ref 3.5–5.1)
RBC # BLD: 2.97 M/UL (ref 4.2–5.9)
SLIDE REVIEW: ABNORMAL
SODIUM BLD-SCNC: 135 MMOL/L (ref 136–145)
SOLUBLE TRANSFERRIN RECEPT: 9.7 MG/L (ref 2.2–5)
WBC # BLD: 12.2 K/UL (ref 4–11)

## 2020-08-20 PROCEDURE — 94770 HC ETCO2 MONITOR DAILY: CPT

## 2020-08-20 PROCEDURE — 2580000003 HC RX 258: Performed by: NURSE PRACTITIONER

## 2020-08-20 PROCEDURE — 6370000000 HC RX 637 (ALT 250 FOR IP): Performed by: INTERNAL MEDICINE

## 2020-08-20 PROCEDURE — 2700000000 HC OXYGEN THERAPY PER DAY

## 2020-08-20 PROCEDURE — 85025 COMPLETE CBC W/AUTO DIFF WBC: CPT

## 2020-08-20 PROCEDURE — 2580000003 HC RX 258: Performed by: FAMILY MEDICINE

## 2020-08-20 PROCEDURE — 1200000000 HC SEMI PRIVATE

## 2020-08-20 PROCEDURE — 80048 BASIC METABOLIC PNL TOTAL CA: CPT

## 2020-08-20 PROCEDURE — 94761 N-INVAS EAR/PLS OXIMETRY MLT: CPT

## 2020-08-20 PROCEDURE — 6360000002 HC RX W HCPCS: Performed by: NURSE PRACTITIONER

## 2020-08-20 PROCEDURE — 6370000000 HC RX 637 (ALT 250 FOR IP): Performed by: NURSE PRACTITIONER

## 2020-08-20 RX ADMIN — METHADONE HYDROCHLORIDE 10 MG: 10 TABLET ORAL at 09:21

## 2020-08-20 RX ADMIN — Medication 10 ML: at 20:53

## 2020-08-20 RX ADMIN — FOLIC ACID 2 MG: 1 TABLET ORAL at 09:21

## 2020-08-20 RX ADMIN — METHADONE HYDROCHLORIDE 10 MG: 10 TABLET ORAL at 15:45

## 2020-08-20 RX ADMIN — METHADONE HYDROCHLORIDE 10 MG: 10 TABLET ORAL at 20:47

## 2020-08-20 RX ADMIN — SODIUM CHLORIDE: 9 INJECTION, SOLUTION INTRAVENOUS at 02:08

## 2020-08-20 RX ADMIN — HYDROXYUREA 1000 MG: 500 CAPSULE ORAL at 20:47

## 2020-08-20 RX ADMIN — Medication: at 04:40

## 2020-08-20 RX ADMIN — BACITRACIN ZINC, NEOMYCIN SULFATE, AND POLYMYXIN B SULFATE: 400; 3.5; 5 OINTMENT TOPICAL at 20:47

## 2020-08-20 ASSESSMENT — PAIN SCALES - GENERAL
PAINLEVEL_OUTOF10: 8
PAINLEVEL_OUTOF10: 9
PAINLEVEL_OUTOF10: 8

## 2020-08-20 ASSESSMENT — PAIN DESCRIPTION - LOCATION: LOCATION: ARM

## 2020-08-20 ASSESSMENT — PAIN DESCRIPTION - PAIN TYPE: TYPE: ACUTE PAIN

## 2020-08-20 ASSESSMENT — PAIN DESCRIPTION - ORIENTATION: ORIENTATION: LEFT

## 2020-08-20 NOTE — PROGRESS NOTES
Patient refuses to use left arm immobilizer, states that it hurts his neck and he is afraid of it pulling on his right IJ line. Continues to rate pain at a level 10/10 despite being asleep every time nurse walks in.

## 2020-08-20 NOTE — PROGRESS NOTES
ONCOLOGY HEMATOLOGY CARE PROGRESS NOTE      SUBJECTIVE:     Awake and alert. Still with pain to L elbow rating it 8/10. Hgb is 9.9. He states he does NOT feel better post exchange. MRI L elbow no drainage effusion and no signs of infection- marrow edema consistent with sickle cell. ROS:   The remaining 10 point review of symptoms is unremarkable. OBJECTIVE        Physical    VITALS:  /69   Pulse 81   Temp 98.8 °F (37.1 °C) (Oral)   Resp 15   Ht 5' 8\" (1.727 m)   Wt 177 lb 4 oz (80.4 kg)   SpO2 100%   BMI 26.95 kg/m²   TEMPERATURE:  Current - Temp: 98.8 °F (37.1 °C); Max - Temp  Av.1 °F (37.3 °C)  Min: 98.5 °F (36.9 °C)  Max: 99.7 °F (37.6 °C)  PULSE OXIMETRY RANGE: SpO2  Av %  Min: 100 %  Max: 100 %  24HR INTAKE/OUTPUT:      Intake/Output Summary (Last 24 hours) at 2020 1128  Last data filed at 2020 0540  Gross per 24 hour   Intake 2789.2 ml   Output 2975 ml   Net -185.8 ml       CONSTITUTIONAL:  awake, alert, cooperative, no apparent distress, HEENT oral pharynx , no scleral icterus. Likely small skin infection under the right orbit. HEMATOLOGIC/LYMPHATICS:  no cervical lymphadenopathy, no supraclavicular lymphadenopathy, no axillary lymphadenopathy and no inguinal lymphadenopathy  LUNGS:  No increased work of breathing, good air exchange, clear to auscultation bilaterally, no crackles or wheezing  CARDIOVASCULAR:  , regular rate and rhythm, normal S1 and S2, no S3 or S4, and no murmur noted  ABDOMEN:  No scars, normal bowel sounds, soft, non-distended, non-tender, no masses palpated, no hepatosplenomegally  MUSCULOSKELETAL:  There is no redness, warmth, or swelling of the joints. EXTREMETIES: No clubbing cynosis or edema  NEUROLOGIC:  L hand  strength is 3/5 with 3/5 MS with abduction and adduction.  Pupils equal and reactive to light bilaterally, smile is symmetrical, no tongue deviation   SKIN:  no bruising or

## 2020-08-20 NOTE — CARE COORDINATION
Chart reviewed. Patient with continued pain issues. Continues on methadone and PCA. D/c when pain adequately controlled. IPTA denied DCP needs.  Wilton Garza RN

## 2020-08-20 NOTE — PROGRESS NOTES
Hospitalist Progress Note      PCP: Andre Linda MD    Date of Admission: 8/6/2020    Chief Complaint: Sickle cell crisis    Hospital Course:  23year old male, with a PMH of sickle cell. Admitted with sickle cell crisis. Hematology consulted and following. Subjective: Still with pain \"all over\". On PCA and methadone. MRI left arm unremarkable. Afebrile. Medications:  Reviewed    Infusion Medications    HYDROmorphone 0  (08/17/20 1122)    sodium chloride 100 mL/hr at 08/20/20 0208     Scheduled Medications    sennosides-docusate sodium  2 tablet Oral BID    IV infusion builder   Intravenous Once    diphenhydrAMINE  50 mg Intravenous Once    sodium chloride  20 mL Intravenous Once    folic acid  2 mg Oral Daily    alteplase  2 mg Intracatheter Once    hydroxyurea  1,000 mg Oral BID    lidocaine  1 patch Transdermal Daily    LORazepam  1 mg Intravenous Once    sodium chloride flush  10 mL Intravenous 2 times per day    neomycin-bacitracin-polymyxin   Topical BID    sodium chloride  20 mL Intravenous Once    methadone  10 mg Oral TID    sodium chloride flush  10 mL Intravenous 2 times per day    enoxaparin  40 mg Subcutaneous Daily     PRN Meds: ondansetron, naloxone, sodium chloride flush, oxyCODONE, diphenhydrAMINE, sodium chloride flush, acetaminophen **OR** acetaminophen      Intake/Output Summary (Last 24 hours) at 8/20/2020 1028  Last data filed at 8/20/2020 0540  Gross per 24 hour   Intake 2789.2 ml   Output 2975 ml   Net -185.8 ml       Physical Exam Performed:    /69   Pulse 81   Temp 98.8 °F (37.1 °C) (Oral)   Resp 15   Ht 5' 8\" (1.727 m)   Wt 177 lb 4 oz (80.4 kg)   SpO2 100%   BMI 26.95 kg/m²     General appearance: No apparent distress, appears stated age and cooperative. HEENT: Pupils equal, round, and reactive to light. Conjunctivae/corneas clear. Neck: Supple, with full range of motion. No jugular venous distention.  Trachea consulted and now signed off. CXR and UA negative,  Checking for AVN of left arm - MRI neg     Sickle cell crisis with pain and anemia  Opioid regimen started  Hematology has taken over as primary  S/p apheresis 8/18. Follow CBC.     Leukocytosis   Probably nonspecific.   Blood cx NGTD.     Left arm pain and tenderness  MRI neg for AVN      DVT Prophylaxis: Lovenox  Diet: DIET GENERAL;  Code Status: Full Code    PT/OT Eval Status: not indicated    Dispo - per primary team    Jacek Bustos, APRN - CNP

## 2020-08-21 LAB
ANION GAP SERPL CALCULATED.3IONS-SCNC: 10 MMOL/L (ref 3–16)
BASOPHILS ABSOLUTE: 0 K/UL (ref 0–0.2)
BASOPHILS RELATIVE PERCENT: 0.5 %
BUN BLDV-MCNC: 10 MG/DL (ref 7–20)
CALCIUM SERPL-MCNC: 8.5 MG/DL (ref 8.3–10.6)
CHLORIDE BLD-SCNC: 105 MMOL/L (ref 99–110)
CO2: 25 MMOL/L (ref 21–32)
CREAT SERPL-MCNC: 0.6 MG/DL (ref 0.9–1.3)
EOSINOPHILS ABSOLUTE: 0.1 K/UL (ref 0–0.6)
EOSINOPHILS RELATIVE PERCENT: 1 %
GFR AFRICAN AMERICAN: >60
GFR NON-AFRICAN AMERICAN: >60
GLUCOSE BLD-MCNC: 139 MG/DL (ref 70–99)
HCT VFR BLD CALC: 24.5 % (ref 40.5–52.5)
HEMOGLOBIN: 8.4 G/DL (ref 13.5–17.5)
LYMPHOCYTES ABSOLUTE: 2.1 K/UL (ref 1–5.1)
LYMPHOCYTES RELATIVE PERCENT: 22.4 %
MCH RBC QN AUTO: 33.6 PG (ref 26–34)
MCHC RBC AUTO-ENTMCNC: 34.5 G/DL (ref 31–36)
MCV RBC AUTO: 97.6 FL (ref 80–100)
MONOCYTES ABSOLUTE: 1.1 K/UL (ref 0–1.3)
MONOCYTES RELATIVE PERCENT: 12 %
NEUTROPHILS ABSOLUTE: 6.1 K/UL (ref 1.7–7.7)
NEUTROPHILS RELATIVE PERCENT: 64.1 %
PDW BLD-RTO: 19 % (ref 12.4–15.4)
PLATELET # BLD: 638 K/UL (ref 135–450)
PMV BLD AUTO: 8.7 FL (ref 5–10.5)
POTASSIUM REFLEX MAGNESIUM: 4.4 MMOL/L (ref 3.5–5.1)
RBC # BLD: 2.51 M/UL (ref 4.2–5.9)
SODIUM BLD-SCNC: 140 MMOL/L (ref 136–145)
WBC # BLD: 9.5 K/UL (ref 4–11)

## 2020-08-21 PROCEDURE — 1200000000 HC SEMI PRIVATE

## 2020-08-21 PROCEDURE — 6370000000 HC RX 637 (ALT 250 FOR IP): Performed by: INTERNAL MEDICINE

## 2020-08-21 PROCEDURE — 2700000000 HC OXYGEN THERAPY PER DAY

## 2020-08-21 PROCEDURE — 6360000002 HC RX W HCPCS: Performed by: NURSE PRACTITIONER

## 2020-08-21 PROCEDURE — 2580000003 HC RX 258: Performed by: NURSE PRACTITIONER

## 2020-08-21 PROCEDURE — 80048 BASIC METABOLIC PNL TOTAL CA: CPT

## 2020-08-21 PROCEDURE — 6370000000 HC RX 637 (ALT 250 FOR IP): Performed by: NURSE PRACTITIONER

## 2020-08-21 PROCEDURE — 2580000003 HC RX 258: Performed by: FAMILY MEDICINE

## 2020-08-21 PROCEDURE — 85025 COMPLETE CBC W/AUTO DIFF WBC: CPT

## 2020-08-21 PROCEDURE — 94770 HC ETCO2 MONITOR DAILY: CPT

## 2020-08-21 PROCEDURE — 94761 N-INVAS EAR/PLS OXIMETRY MLT: CPT

## 2020-08-21 RX ADMIN — METHADONE HYDROCHLORIDE 15 MG: 5 TABLET ORAL at 14:38

## 2020-08-21 RX ADMIN — SODIUM CHLORIDE: 9 INJECTION, SOLUTION INTRAVENOUS at 20:50

## 2020-08-21 RX ADMIN — ALTEPLASE 1 MG: 2.2 INJECTION, POWDER, LYOPHILIZED, FOR SOLUTION INTRAVENOUS at 23:26

## 2020-08-21 RX ADMIN — BACITRACIN ZINC, NEOMYCIN SULFATE, AND POLYMYXIN B SULFATE: 400; 3.5; 5 OINTMENT TOPICAL at 20:54

## 2020-08-21 RX ADMIN — METHADONE HYDROCHLORIDE 15 MG: 5 TABLET ORAL at 20:50

## 2020-08-21 RX ADMIN — Medication 10 ML: at 08:30

## 2020-08-21 RX ADMIN — SODIUM CHLORIDE: 9 INJECTION, SOLUTION INTRAVENOUS at 08:25

## 2020-08-21 RX ADMIN — HYDROXYUREA 1000 MG: 500 CAPSULE ORAL at 20:50

## 2020-08-21 RX ADMIN — Medication 0.2 MG: at 13:14

## 2020-08-21 RX ADMIN — HYDROXYUREA 1000 MG: 500 CAPSULE ORAL at 08:28

## 2020-08-21 RX ADMIN — Medication 10 ML: at 20:56

## 2020-08-21 RX ADMIN — SODIUM CHLORIDE: 9 INJECTION, SOLUTION INTRAVENOUS at 18:01

## 2020-08-21 RX ADMIN — BACITRACIN ZINC, NEOMYCIN SULFATE, AND POLYMYXIN B SULFATE: 400; 3.5; 5 OINTMENT TOPICAL at 08:25

## 2020-08-21 RX ADMIN — FOLIC ACID 2 MG: 1 TABLET ORAL at 08:25

## 2020-08-21 RX ADMIN — METHADONE HYDROCHLORIDE 10 MG: 10 TABLET ORAL at 08:25

## 2020-08-21 ASSESSMENT — PAIN DESCRIPTION - LOCATION
LOCATION: ARM
LOCATION: ARM

## 2020-08-21 ASSESSMENT — PAIN SCALES - GENERAL
PAINLEVEL_OUTOF10: 7
PAINLEVEL_OUTOF10: 7
PAINLEVEL_OUTOF10: 8
PAINLEVEL_OUTOF10: 7
PAINLEVEL_OUTOF10: 8

## 2020-08-21 ASSESSMENT — PAIN DESCRIPTION - PAIN TYPE
TYPE: ACUTE PAIN
TYPE: ACUTE PAIN

## 2020-08-21 ASSESSMENT — PAIN DESCRIPTION - ORIENTATION
ORIENTATION: LEFT
ORIENTATION: LEFT

## 2020-08-21 NOTE — PROGRESS NOTES
08/21/20 1536   Oxygen Therapy/Pulse Ox   O2 Therapy Oxygen   O2 Device Nasal cannula   O2 Flow Rate (L/min) 1.5 L/min   Resp 14   SpO2 100 %   $End Tidal CO2 48

## 2020-08-21 NOTE — PLAN OF CARE
Pt remains free from accidental injury or fall. Pt ambulates self in room without difficulty. Will monitor.      Problem: Falls - Risk of:  Goal: Will remain free from falls  Description: Will remain free from falls  Outcome: Ongoing  Goal: Absence of physical injury  Description: Absence of physical injury  Outcome: Ongoing

## 2020-08-21 NOTE — PROGRESS NOTES
ONCOLOGY HEMATOLOGY CARE PROGRESS NOTE      SUBJECTIVE:     The patient states that his pain is better today. .       ROS:   The remaining 10 point review of symptoms is unremarkable. OBJECTIVE        Physical    VITALS:  /69   Pulse 70   Temp 98.2 °F (36.8 °C) (Oral)   Resp 14   Ht 5' 8\" (1.727 m)   Wt 177 lb 4 oz (80.4 kg)   SpO2 100%   BMI 26.95 kg/m²   TEMPERATURE:  Current - Temp: 98.2 °F (36.8 °C); Max - Temp  Av.4 °F (36.9 °C)  Min: 98 °F (36.7 °C)  Max: 98.7 °F (37.1 °C)  PULSE OXIMETRY RANGE: SpO2  Av.2 %  Min: 97 %  Max: 100 %  24HR INTAKE/OUTPUT:      Intake/Output Summary (Last 24 hours) at 2020 1011  Last data filed at 2020 0600  Gross per 24 hour   Intake 1576 ml   Output 2140 ml   Net -564 ml       CONSTITUTIONAL:  awake, alert, cooperative, no apparent distress, HEENT oral pharynx , no scleral icterus. Likely small skin infection under the right orbit. HEMATOLOGIC/LYMPHATICS:  no cervical lymphadenopathy, no supraclavicular lymphadenopathy, no axillary lymphadenopathy and no inguinal lymphadenopathy  LUNGS:  No increased work of breathing, good air exchange, clear to auscultation bilaterally, no crackles or wheezing  CARDIOVASCULAR:  , regular rate and rhythm, normal S1 and S2, no S3 or S4, and no murmur noted  ABDOMEN:  No scars, normal bowel sounds, soft, non-distended, non-tender, no masses palpated, no hepatosplenomegally  MUSCULOSKELETAL:  There is no redness, warmth, or swelling of the joints. EXTREMETIES: No clubbing cynosis or edema  NEUROLOGIC:  L hand  strength is 3/5 with 3/5 MS with abduction and adduction.  Pupils equal and reactive to light bilaterally, smile is symmetrical, no tongue deviation   SKIN:  no bruising or bleeding      Data      Recent Labs     20  0730 20  0542 20  0348   WBC 11.2* 12.2* 9.5   HGB 10.2* 9.9* 8.4*   HCT 29.5* 28.7* 24.5*   * 607* 638*   MCV 94.1 96.6 97.6 Recent Labs     08/19/20  0730 08/20/20  0647 08/21/20  0348    135* 140   K 4.3 4.1 4.4    99 105   CO2 27 27 25   BUN 12 8 10   CREATININE 0.6* 0.6* 0.6*     No results for input(s): AST, ALT, ALB, BILIDIR, BILITOT, ALKPHOS in the last 72 hours. Magnesium:    Lab Results   Component Value Date    MG 1.50 08/17/2020    MG 1.70 08/15/2020    MG 2.10 07/30/2020         Problem List  Patient Active Problem List   Diagnosis    [facility]    Asthma    [facility]ickle cell crisis [facility]    Sepsis ([facility] [address]       ASSESSMENT AND PLAN:    Sickle cell crisis:  -No evidence of acute chest pain syndrome- no stroke. -This is minimally better  -We will try blood products, IV fluids, Hydrea and analgesia  -There is no definite precipitating cause  - s/p red cell exchange on 8-18       Pain:  - cont PCA at current settings   -His pain is better today  -I will increase his methadone to 15 mg p.o. 3 times daily  -Tomorrow we will try to decrease his PCA or maybe later today  -I think this will be the best way to get him off the PCA  -I do not think that just continuing the PCA and the same dose of methadone is going to do it    Access  - PICC line  - likely needs outpatient PORT    Pain:  - Cont Methadone and PCA at current settings.   -I do not think that we can increase his pain medications for concern of respiratory depression    Infectious disease:  -COVID testing negative     L arm weakness  - CTA head negative for stroke  - MRI no signs of AVN or abscess or drainage effusion   - sling ordered for immobilization     Fever  - pan cx, chest xray -Both are normal  - procal Mildly elevated 0.51 lactic acid is normal  - cont Rocephin and Zithromax - blood cx NGTD- stopped 8-17   -His fever has subsided and it may be due to his crisis    Hypomagnesemia  -replaced       ONCOLOGIC DISPOSITION: TBD     -Once we can get his IV pain medication stopped- weaning- concerned for dependence.    -He will follow-up with Dr. Darin Bhagat  - s/p apheresis 8-18   - Likely here another 2-3 days   -I will discuss Munoz Reyez and other therapeutic measures with her to see if we can help diminish his sickle cell crisis    Michele Paul MD  May be reached through [address] [place] [address]

## 2020-08-21 NOTE — CARE COORDINATION
Chart reviewed day 15. Continued pain issues. Methadone dose increased. Needs weaned from PCA. Patient was IPTA denied d/c needs. Lives with mother.  Rigo Hamilton RN

## 2020-08-21 NOTE — PROGRESS NOTES
Nutrition Assessment     Type and Reason for Visit: Reassess    Nutrition Recommendations/Plan:   Continue general diet as tolerated   Monitor nutrition adequacy, pertinent labs, bowel habits, wt changes, and clinical progress     Nutrition Assessment:  Follow up: PO intakes continue adequate as pt consuming % of meals this admission. Continues on PCA pump for pain. Family bringing in food from home at times. No nutrition concerns at this time. Nutrition Related Findings: BM 8/20      Current Nutrition Therapies:    DIET GENERAL; Anthropometric Measures:  · Height: 5' 8\" (172.7 cm)  · Current Body Wt: 169 lb (76.7 kg)   · BMI: 25.7    Nutrition Diagnosis:   No nutrition diagnosis at this time     Nutrition Interventions:   Food and/or Nutrient Delivery:  Continue Current Diet  Nutrition Education/Counseling:  No recommendation at this time   Coordination of Nutrition Care:  No recommendation at this time    Electronically signed by Ricky Lujan.  Jose Maciel RD, LD on 8/21/20 at 1:11 PM EDT    Contact: [phone]

## 2020-08-21 NOTE — PLAN OF CARE
Problem: Falls - Risk of:  Goal: Will remain free from falls  Description: Will remain free from falls  Outcome: Ongoing     Problem: Pain:  Goal: Control of chronic pain  Description: Control of chronic pain  Outcome: Ongoing

## 2020-08-22 LAB
ANION GAP SERPL CALCULATED.3IONS-SCNC: 9 MMOL/L (ref 3–16)
ANISOCYTOSIS: ABNORMAL
BASOPHILS ABSOLUTE: 0 K/UL (ref 0–0.2)
BASOPHILS RELATIVE PERCENT: 0 %
BUN BLDV-MCNC: 10 MG/DL (ref 7–20)
CALCIUM SERPL-MCNC: 9.4 MG/DL (ref 8.3–10.6)
CHLORIDE BLD-SCNC: 102 MMOL/L (ref 99–110)
CO2: 27 MMOL/L (ref 21–32)
CREAT SERPL-MCNC: 0.6 MG/DL (ref 0.9–1.3)
EOSINOPHILS ABSOLUTE: 0.3 K/UL (ref 0–0.6)
EOSINOPHILS RELATIVE PERCENT: 4 %
GFR AFRICAN AMERICAN: >60
GFR NON-AFRICAN AMERICAN: >60
GLUCOSE BLD-MCNC: 101 MG/DL (ref 70–99)
HCT VFR BLD CALC: 23.9 % (ref 40.5–52.5)
HEMOGLOBIN: 8.2 G/DL (ref 13.5–17.5)
LYMPHOCYTES ABSOLUTE: 3.3 K/UL (ref 1–5.1)
LYMPHOCYTES RELATIVE PERCENT: 44 %
MACROCYTES: ABNORMAL
MCH RBC QN AUTO: 33.1 PG (ref 26–34)
MCHC RBC AUTO-ENTMCNC: 34.1 G/DL (ref 31–36)
MCV RBC AUTO: 97.2 FL (ref 80–100)
MICROCYTES: ABNORMAL
MONOCYTES ABSOLUTE: 0.5 K/UL (ref 0–1.3)
MONOCYTES RELATIVE PERCENT: 6 %
NEUTROPHILS ABSOLUTE: 3.5 K/UL (ref 1.7–7.7)
NEUTROPHILS RELATIVE PERCENT: 46 %
NUCLEATED RED BLOOD CELLS: 4 /100 WBC
NUCLEATED RED BLOOD CELLS: 4 /100 WBC
OVALOCYTES: ABNORMAL
PDW BLD-RTO: 19 % (ref 12.4–15.4)
PLATELET # BLD: 698 K/UL (ref 135–450)
PLATELET SLIDE REVIEW: ABNORMAL
PMV BLD AUTO: 8.6 FL (ref 5–10.5)
POLYCHROMASIA: ABNORMAL
POTASSIUM REFLEX MAGNESIUM: 4.2 MMOL/L (ref 3.5–5.1)
RBC # BLD: 2.46 M/UL (ref 4.2–5.9)
SCHISTOCYTES: ABNORMAL
SLIDE REVIEW: ABNORMAL
SODIUM BLD-SCNC: 138 MMOL/L (ref 136–145)
TARGET CELLS: ABNORMAL
TEAR DROP CELLS: ABNORMAL
WBC # BLD: 7.5 K/UL (ref 4–11)

## 2020-08-22 PROCEDURE — 94770 HC ETCO2 MONITOR DAILY: CPT

## 2020-08-22 PROCEDURE — 6360000002 HC RX W HCPCS: Performed by: NURSE PRACTITIONER

## 2020-08-22 PROCEDURE — 94761 N-INVAS EAR/PLS OXIMETRY MLT: CPT

## 2020-08-22 PROCEDURE — 1200000000 HC SEMI PRIVATE

## 2020-08-22 PROCEDURE — 85025 COMPLETE CBC W/AUTO DIFF WBC: CPT

## 2020-08-22 PROCEDURE — 36592 COLLECT BLOOD FROM PICC: CPT

## 2020-08-22 PROCEDURE — 2580000003 HC RX 258: Performed by: FAMILY MEDICINE

## 2020-08-22 PROCEDURE — 80048 BASIC METABOLIC PNL TOTAL CA: CPT

## 2020-08-22 PROCEDURE — 2700000000 HC OXYGEN THERAPY PER DAY

## 2020-08-22 PROCEDURE — 2580000003 HC RX 258: Performed by: NURSE PRACTITIONER

## 2020-08-22 PROCEDURE — 6370000000 HC RX 637 (ALT 250 FOR IP): Performed by: INTERNAL MEDICINE

## 2020-08-22 PROCEDURE — 6370000000 HC RX 637 (ALT 250 FOR IP): Performed by: NURSE PRACTITIONER

## 2020-08-22 RX ADMIN — SODIUM CHLORIDE: 9 INJECTION, SOLUTION INTRAVENOUS at 20:43

## 2020-08-22 RX ADMIN — Medication: at 15:26

## 2020-08-22 RX ADMIN — Medication 10 ML: at 20:44

## 2020-08-22 RX ADMIN — METHADONE HYDROCHLORIDE 15 MG: 5 TABLET ORAL at 15:00

## 2020-08-22 RX ADMIN — BACITRACIN ZINC, NEOMYCIN SULFATE, AND POLYMYXIN B SULFATE: 400; 3.5; 5 OINTMENT TOPICAL at 20:44

## 2020-08-22 RX ADMIN — FOLIC ACID 2 MG: 1 TABLET ORAL at 08:51

## 2020-08-22 RX ADMIN — METHADONE HYDROCHLORIDE 15 MG: 5 TABLET ORAL at 08:51

## 2020-08-22 RX ADMIN — HYDROXYUREA 1000 MG: 500 CAPSULE ORAL at 20:43

## 2020-08-22 RX ADMIN — BACITRACIN ZINC, NEOMYCIN SULFATE, AND POLYMYXIN B SULFATE: 400; 3.5; 5 OINTMENT TOPICAL at 08:51

## 2020-08-22 RX ADMIN — Medication 10 ML: at 09:03

## 2020-08-22 RX ADMIN — METHADONE HYDROCHLORIDE 15 MG: 5 TABLET ORAL at 20:43

## 2020-08-22 RX ADMIN — HYDROXYUREA 1000 MG: 500 CAPSULE ORAL at 08:58

## 2020-08-22 RX ADMIN — SODIUM CHLORIDE: 9 INJECTION, SOLUTION INTRAVENOUS at 15:05

## 2020-08-22 ASSESSMENT — PAIN DESCRIPTION - ORIENTATION: ORIENTATION: LEFT

## 2020-08-22 ASSESSMENT — PAIN SCALES - GENERAL
PAINLEVEL_OUTOF10: 7
PAINLEVEL_OUTOF10: 8
PAINLEVEL_OUTOF10: 7
PAINLEVEL_OUTOF10: 8
PAINLEVEL_OUTOF10: 8

## 2020-08-22 ASSESSMENT — PAIN DESCRIPTION - PAIN TYPE: TYPE: ACUTE PAIN

## 2020-08-22 ASSESSMENT — PAIN DESCRIPTION - LOCATION: LOCATION: ARM

## 2020-08-22 NOTE — PROGRESS NOTES
Shift assessment completed per documentation. Pt A&Ox4. VSS. Pt awake in bed. Pt c/o acute left arm pain 8/10. Bilateral lung sounds clear. Bed locked and in lowest position. Nonskid slippers on. Side rails up 2/4. Call light and personal belongings within reach. Will continue to monitor.

## 2020-08-22 NOTE — PROGRESS NOTES
Hospitalist Progress Note      PCP: Shalini Hollis MD    Date of Admission: 8/6/2020    Chief Complaint: Sickle cell crisis    Hospital Course:  23year old male, with a PMH of sickle cell. Admitted with sickle cell crisis. Hematology consulted and following. Subjective:   No sig change. Still having pain  Heme/Onc adjusting meds. Medications:  Reviewed    Infusion Medications    HYDROmorphone 0.2 mg (08/21/20 1314)    sodium chloride 100 mL/hr at 08/21/20 2050     Scheduled Medications    methadone  15 mg Oral TID    sennosides-docusate sodium  2 tablet Oral BID    IV infusion builder   Intravenous Once    diphenhydrAMINE  50 mg Intravenous Once    sodium chloride  20 mL Intravenous Once    folic acid  2 mg Oral Daily    alteplase  2 mg Intracatheter Once    hydroxyurea  1,000 mg Oral BID    lidocaine  1 patch Transdermal Daily    LORazepam  1 mg Intravenous Once    sodium chloride flush  10 mL Intravenous 2 times per day    neomycin-bacitracin-polymyxin   Topical BID    sodium chloride  20 mL Intravenous Once    sodium chloride flush  10 mL Intravenous 2 times per day    enoxaparin  40 mg Subcutaneous Daily     PRN Meds: ondansetron, naloxone, sodium chloride flush, oxyCODONE, diphenhydrAMINE, sodium chloride flush, acetaminophen **OR** acetaminophen      Intake/Output Summary (Last 24 hours) at 8/22/2020 1229  Last data filed at 8/22/2020 1108  Gross per 24 hour   Intake 4951.8 ml   Output 4115 ml   Net 836.8 ml       Physical Exam Performed:    BP (!) 112/43   Pulse 92   Temp 98.3 °F (36.8 °C) (Oral)   Resp 11   Ht 5' 8\" (1.727 m)   Wt 174 lb 3.2 oz (79 kg)   SpO2 95%   BMI 26.49 kg/m²     General appearance: No apparent distress, appears stated age and cooperative. HEENT: Pupils equal, round, and reactive to light. Conjunctivae/corneas clear. Neck: Supple, with full range of motion. No jugular venous distention. Trachea midline.   Respiratory:  Normal respiratory effort. Clear to auscultation, bilaterally without Rales/Wheezes/Rhonchi. Cardiovascular: Regular rate and rhythm with normal S1/S2 without murmurs, rubs or gallops. Abdomen: Soft, non-tender, non-distended with normal bowel sounds. Musculoskeletal: No clubbing, cyanosis or edema bilaterally. Full range of motion without deformity. Skin: Skin color, texture, turgor normal.  No rashes or lesions. Neurologic:  Neurovascularly intact without any focal sensory/motor deficits. Cranial nerves: II-XII intact, grossly non-focal.  Psychiatric: Alert and oriented, thought content appropriate, normal insight  Capillary Refill: Brisk,< 3 seconds   Peripheral Pulses: +2 palpable, equal bilaterally       Labs:   Recent Labs     08/20/20  0542 08/21/20  0348 08/22/20  0607   WBC 12.2* 9.5 7.5   HGB 9.9* 8.4* 8.2*   HCT 28.7* 24.5* 23.9*   * 638* 698*     Recent Labs     08/20/20  0647 08/21/20  0348 08/22/20  0607   * 140 138   K 4.1 4.4 4.2   CL 99 105 102   CO2 27 25 27   BUN 8 10 10   CREATININE 0.6* 0.6* 0.6*   CALCIUM 9.4 8.5 9.4     No results for input(s): AST, ALT, BILIDIR, BILITOT, ALKPHOS in the last 72 hours. No results for input(s): INR in the last 72 hours. No results for input(s): Chinyerebhupendra Beck in the last 72 hours. Urinalysis:      Lab Results   Component Value Date    NITRU Negative 08/14/2020    WBCUA None seen 08/14/2020    RBCUA 0-2 08/14/2020    BLOODU SMALL 08/14/2020    SPECGRAV 1.015 08/14/2020    GLUCOSEU Negative 08/14/2020       Radiology:  MRI HUMERUS LEFT WO CONTRAST   Final Result   Diffuse marrow edema throughout the humerus with surrounding soft tissue   edema. Findings are likely related to sickle cell disease. No discrete bone   infarct is seen at this time. CT ABDOMEN PELVIS W IV CONTRAST Additional Contrast? Oral   Final Result   1. No acute process within the abdomen or pelvis. 2. No CT evidence of organ infarction. 3. Mild hepatomegaly. 4. Patient status post splenectomy. 5. Large amount of stool throughout the colon, suggestive of constipation. IR NON TUNNELED CATH WO PORT REPLACEMENT   Final Result   Successful ultrasound and fluoroscopy guided non-tunneled right IJ temporary   dialysis catheter placement. VL Extremity Venous Bilateral   Final Result      CTA HEAD W WO CONTRAST   Final Result   1. No CT evidence of an acute infarct. 2. Unremarkable CTA of the head. 3.  The findings were sent to the Radiology Results [address] at   5:53 pm on 8/17/2020to be communicated to a licensed caregiver. IR PICC WO SQ PORT/PUMP > 5 YEARS   Final Result   Successful placement of PICC line. XR CHEST (2 VW)   Final Result   No evidence of acute cardiopulmonary disease. XR ELBOW LEFT (MIN 3 VIEWS)   Final Result   No acute osseous abnormality left humerus      No acute osseous abnormality left elbow         XR HUMERUS LEFT (MIN 2 VIEWS)   Final Result   No acute osseous abnormality left humerus      No acute osseous abnormality left elbow         XR KNEE LEFT (3 VIEWS)   Final Result   No acute osseous abnormality of the left knee. XR KNEE RIGHT (1-2 VIEWS)   Final Result   No acute abnormality of either knee. No significant arthritic change. XR KNEE LEFT (1-2 VIEWS)   Final Result   No acute abnormality of either knee. No significant arthritic change. CT CHEST PULMONARY EMBOLISM W CONTRAST   Final Result   No evidence of pulmonary embolism or acute pulmonary abnormality. XR CHEST PORTABLE   Final Result   No convincing acute cardiopulmonary abnormality. Assessment/Plan:    Active Hospital Problems    Diagnosis    Sepsis ([address] [A41.9]    Sickle cell crisis ([address] [D57.00]     SIRS without infection, present on arrival  Currently afebrile   Continue IV hydration  No evidence of infection. Off antibiotics. ID consulted and now signed off.     CXR and UA negative,  Checking for AVN of left arm - MRI neg     Sickle cell crisis with pain and anemia  Opioid regimen started  Hematology has taken over as primary  S/p apheresis 8/18. Follow CBC.     Leukocytosis   Probably nonspecific. Blood cx NGTD.     Left arm pain and tenderness  MRI neg for AVN      DVT Prophylaxis: Lovenox  Diet: DIET GENERAL;  Code Status: Full Code    PT/OT Eval Status: not indicated    Dispo - per primary team, will sign off.  Please reconsult if we can help in any way    Dale Godinez, CARMELITA - CNP

## 2020-08-22 NOTE — PLAN OF CARE
Problem: Pain:  Goal: Pain level will decrease  Description: Pain level will decrease  Outcome: Ongoing       Pt's PCA pump working appropriately. Call light and bedside table within reach. Denies any other needs at this time.

## 2020-08-22 NOTE — PROGRESS NOTES
08/21/20 2013   Oxygen Therapy/Pulse Ox   O2 Therapy Oxygen   O2 Device Nasal cannula   O2 Flow Rate (L/min) 1.5 L/min   Resp 18   SpO2 100 %   $End Tidal CO2 49

## 2020-08-22 NOTE — PROGRESS NOTES
ONCOLOGY HEMATOLOGY CARE PROGRESS NOTE      SUBJECTIVE:     Afebrile and on 1.5 LPM by NC. He still has pain that is 8 out of 10. \"I've been better. \"      ROS:   The remaining 10 point review of symptoms is unremarkable. OBJECTIVE        Physical    VITALS:  /71   Pulse 72   Temp 98.3 °F (36.8 °C) (Oral)   Resp 18   Ht 5' 8\" (1.727 m)   Wt 174 lb 3.2 oz (79 kg)   SpO2 98%   BMI 26.49 kg/m²   TEMPERATURE:  Current - Temp: 98.3 °F (36.8 °C); Max - Temp  Av.5 °F (36.9 °C)  Min: 97.9 °F (36.6 °C)  Max: 99.2 °F (37.3 °C)  PULSE OXIMETRY RANGE: SpO2  Av.7 %  Min: 98 %  Max: 100 %  24HR INTAKE/OUTPUT:      Intake/Output Summary (Last 24 hours) at 2020 1027  Last data filed at 2020 0837  Gross per 24 hour   Intake 4701.8 ml   Output 3815 ml   Net 886.8 ml       CONSTITUTIONAL:  awake, alert, cooperative, no apparent distress, HEENT oral pharynx , no scleral icterus. Likely small skin infection under the right orbit. HEMATOLOGIC/LYMPHATICS:  no cervical lymphadenopathy, no supraclavicular lymphadenopathy, no axillary lymphadenopathy and no inguinal lymphadenopathy  LUNGS:  No increased work of breathing, good air exchange, clear to auscultation bilaterally, no crackles or wheezing  CARDIOVASCULAR:  , regular rate and rhythm, normal S1 and S2, no S3 or S4, and no murmur noted  ABDOMEN:  No scars, normal bowel sounds, soft, non-distended, non-tender, no masses palpated, no hepatosplenomegally  MUSCULOSKELETAL:  There is no redness, warmth, or swelling of the joints. EXTREMETIES: No clubbing cynosis or edema  NEUROLOGIC:  L hand  strength is 3/5 with 3/5 MS with abduction and adduction.  Pupils equal and reactive to light bilaterally, smile is symmetrical, no tongue deviation   SKIN:  no bruising or bleeding      Data      Recent Labs     20  0542 20  0348 20  0607   WBC 12.2* 9.5 7.5   HGB 9.9* 8.4* 8.2*   HCT 28.7* 24.5* 23.9* * 638* 698*   MCV 96.6 97.6 97.2        Recent Labs     08/20/20  0647 08/21/20  0348 08/22/20  0607   * 140 138   K 4.1 4.4 4.2   CL 99 105 102   CO2 27 25 27   BUN 8 10 10   CREATININE 0.6* 0.6* 0.6*     No results for input(s): AST, ALT, ALB, BILIDIR, BILITOT, ALKPHOS in the last 72 hours. Magnesium:    Lab Results   Component Value Date    MG 1.50 08/17/2020    MG 1.70 08/15/2020    MG 2.10 07/30/2020         Problem List  Patient Active Problem List   Diagnosis    Sick[facility] pain crisis ([facility]    Asthma    Sickle cell crisis ([facility] [facility]    Sepsis ([facility] [facility]       ASSESSMENT AND PLAN:    Sickle cell crisis:  -No evidence of acute chest pain syndrome- no stroke. -This is minimally better  -We will try blood products, IV fluids, Hydrea and analgesia  -There is no definite precipitating cause  - s/p red cell exchange on 8-18     Pain:  - cont PCA at current settings   -His pain is better today  -Increased his methadone to 15 mg p.o. 3 times daily on 8/21/2020. This drug will reach steady state in 3-5 days due to its long half life.  -I will ask him to use only Oxyodone IR for breakthru. If it doesn't work, he can use the PCA.   It may take a few days to see the effect of increasing his Methadone.  -I think this will be the best way to get him off the PCA  -I do not think that just continuing the PCA and the same dose of methadone is going to do it    Access  - PICC line  - likely needs outpatient PORT    Pain:  - Cont Methadone and PCA at current settings.   -I do not think that we can increase his pain medications for concern of respiratory depression    Infectious disease:  -COVID testing negative     L arm weakness  - CTA head negative for stroke  - MRI no signs of AVN or abscess or drainage effusion   - sling ordered for immobilization     Fever  - pan cx, chest xray -Both are normal  - procal Mildly elevated 0.51 lactic acid is normal  - cont Rocephin and Zithromax - blood cx NGTD- stopped 8-17

## 2020-08-22 NOTE — PROGRESS NOTES
Pt assessment completed and charted. VSS. Pt a/o x4. PCA pump working appropriately. Bed in lowest position and wheels locked. Call light within reach. Bedside table within reach. Non-skid footwear in place. Pt denies any other needs at this time. Pt calls out appropriately. Will continue to monitor.

## 2020-08-23 LAB
ANION GAP SERPL CALCULATED.3IONS-SCNC: 10 MMOL/L (ref 3–16)
BASOPHILS ABSOLUTE: 0.1 K/UL (ref 0–0.2)
BASOPHILS RELATIVE PERCENT: 1 %
BUN BLDV-MCNC: 8 MG/DL (ref 7–20)
CALCIUM SERPL-MCNC: 9.5 MG/DL (ref 8.3–10.6)
CHLORIDE BLD-SCNC: 101 MMOL/L (ref 99–110)
CO2: 28 MMOL/L (ref 21–32)
CREAT SERPL-MCNC: 0.6 MG/DL (ref 0.9–1.3)
EOSINOPHILS ABSOLUTE: 0.2 K/UL (ref 0–0.6)
EOSINOPHILS RELATIVE PERCENT: 2.4 %
GFR AFRICAN AMERICAN: >60
GFR NON-AFRICAN AMERICAN: >60
GLUCOSE BLD-MCNC: 118 MG/DL (ref 70–99)
HCT VFR BLD CALC: 22.4 % (ref 40.5–52.5)
HEMOGLOBIN: 7.6 G/DL (ref 13.5–17.5)
LYMPHOCYTES ABSOLUTE: 2.6 K/UL (ref 1–5.1)
LYMPHOCYTES RELATIVE PERCENT: 33.8 %
MCH RBC QN AUTO: 33.1 PG (ref 26–34)
MCHC RBC AUTO-ENTMCNC: 33.9 G/DL (ref 31–36)
MCV RBC AUTO: 97.6 FL (ref 80–100)
MONOCYTES ABSOLUTE: 1 K/UL (ref 0–1.3)
MONOCYTES RELATIVE PERCENT: 12.6 %
NEUTROPHILS ABSOLUTE: 3.9 K/UL (ref 1.7–7.7)
NEUTROPHILS RELATIVE PERCENT: 50.2 %
PDW BLD-RTO: 19.2 % (ref 12.4–15.4)
PLATELET # BLD: 796 K/UL (ref 135–450)
PMV BLD AUTO: 8.7 FL (ref 5–10.5)
POTASSIUM REFLEX MAGNESIUM: 3.9 MMOL/L (ref 3.5–5.1)
RBC # BLD: 2.3 M/UL (ref 4.2–5.9)
SODIUM BLD-SCNC: 139 MMOL/L (ref 136–145)
WBC # BLD: 7.8 K/UL (ref 4–11)

## 2020-08-23 PROCEDURE — 1200000000 HC SEMI PRIVATE

## 2020-08-23 PROCEDURE — 6370000000 HC RX 637 (ALT 250 FOR IP): Performed by: NURSE PRACTITIONER

## 2020-08-23 PROCEDURE — 80048 BASIC METABOLIC PNL TOTAL CA: CPT

## 2020-08-23 PROCEDURE — 2700000000 HC OXYGEN THERAPY PER DAY

## 2020-08-23 PROCEDURE — 2580000003 HC RX 258: Performed by: FAMILY MEDICINE

## 2020-08-23 PROCEDURE — 94761 N-INVAS EAR/PLS OXIMETRY MLT: CPT

## 2020-08-23 PROCEDURE — 85025 COMPLETE CBC W/AUTO DIFF WBC: CPT

## 2020-08-23 PROCEDURE — 2580000003 HC RX 258: Performed by: NURSE PRACTITIONER

## 2020-08-23 PROCEDURE — 6370000000 HC RX 637 (ALT 250 FOR IP): Performed by: INTERNAL MEDICINE

## 2020-08-23 PROCEDURE — 94770 HC ETCO2 MONITOR DAILY: CPT

## 2020-08-23 PROCEDURE — 6360000002 HC RX W HCPCS: Performed by: NURSE PRACTITIONER

## 2020-08-23 PROCEDURE — 36592 COLLECT BLOOD FROM PICC: CPT

## 2020-08-23 RX ADMIN — HYDROXYUREA 1000 MG: 500 CAPSULE ORAL at 09:00

## 2020-08-23 RX ADMIN — SODIUM CHLORIDE: 9 INJECTION, SOLUTION INTRAVENOUS at 22:01

## 2020-08-23 RX ADMIN — METHADONE HYDROCHLORIDE 15 MG: 5 TABLET ORAL at 15:02

## 2020-08-23 RX ADMIN — HYDROXYUREA 1000 MG: 500 CAPSULE ORAL at 22:01

## 2020-08-23 RX ADMIN — Medication 10 ML: at 09:00

## 2020-08-23 RX ADMIN — Medication 10 ML: at 22:02

## 2020-08-23 RX ADMIN — BACITRACIN ZINC, NEOMYCIN SULFATE, AND POLYMYXIN B SULFATE: 400; 3.5; 5 OINTMENT TOPICAL at 08:59

## 2020-08-23 RX ADMIN — METHADONE HYDROCHLORIDE 15 MG: 5 TABLET ORAL at 22:01

## 2020-08-23 RX ADMIN — FOLIC ACID 2 MG: 1 TABLET ORAL at 08:59

## 2020-08-23 RX ADMIN — BACITRACIN ZINC, NEOMYCIN SULFATE, AND POLYMYXIN B SULFATE: 400; 3.5; 5 OINTMENT TOPICAL at 22:06

## 2020-08-23 RX ADMIN — Medication: at 14:11

## 2020-08-23 RX ADMIN — SODIUM CHLORIDE: 9 INJECTION, SOLUTION INTRAVENOUS at 12:03

## 2020-08-23 RX ADMIN — METHADONE HYDROCHLORIDE 15 MG: 5 TABLET ORAL at 08:59

## 2020-08-23 ASSESSMENT — PAIN SCALES - GENERAL
PAINLEVEL_OUTOF10: 8
PAINLEVEL_OUTOF10: 7

## 2020-08-23 ASSESSMENT — PAIN DESCRIPTION - PAIN TYPE: TYPE: ACUTE PAIN

## 2020-08-23 ASSESSMENT — PAIN DESCRIPTION - LOCATION: LOCATION: ARM

## 2020-08-23 ASSESSMENT — PAIN DESCRIPTION - ORIENTATION: ORIENTATION: LEFT

## 2020-08-23 NOTE — PROGRESS NOTES
Shift assessment completed per documentation. Pt A&Ox4. VSS. Pt awake in bed. Pt c/o acute left arm pain 7/10. Heat pack applied. Bilateral lung sounds clear. Bed locked and in lowest position. Nonskid slippers on. Side rails up 2/4. Call light and personal belongings within reach. Will continue to monitor.

## 2020-08-23 NOTE — PLAN OF CARE
Problem: Falls - Risk of:  Goal: Will remain free from falls  Description: Will remain free from falls  Outcome: Ongoing     Problem: Falls - Risk of:  Goal: Absence of physical injury  Description: Absence of physical injury  Outcome: Ongoing     Pt remains free from falls or injury. Pt calls out appropriately. Pt uses urinal for elimination needs. Denies any other needs at this time. Call light and bedside table within reach. Will continue to monitor.

## 2020-08-23 NOTE — PROGRESS NOTES
ONCOLOGY HEMATOLOGY CARE PROGRESS NOTE      SUBJECTIVE:     Afebrile and on 1.5 LPM by NC. Sleeping. ROS:   The remaining 10 point review of symptoms is unremarkable. OBJECTIVE        Physical    VITALS:  /61   Pulse 97   Temp 98.3 °F (36.8 °C) (Oral)   Resp 16   Ht 5' 8\" (1.727 m)   Wt 176 lb 5.9 oz (80 kg)   SpO2 98%   BMI 26.82 kg/m²   TEMPERATURE:  Current - Temp: 98.3 °F (36.8 °C); Max - Temp  Av.5 °F (36.9 °C)  Min: 98 °F (36.7 °C)  Max: 99.1 °F (37.3 °C)  PULSE OXIMETRY RANGE: SpO2  Av.3 %  Min: 98 %  Max: 100 %  24HR INTAKE/OUTPUT:      Intake/Output Summary (Last 24 hours) at 2020 1118  Last data filed at 2020 1023  Gross per 24 hour   Intake 3502.62 ml   Output 2450 ml   Net 1052.62 ml       CONSTITUTIONAL:  awake, alert, cooperative, no apparent distress, HEENT oral pharynx , no scleral icterus. Likely small skin infection under the right orbit. HEMATOLOGIC/LYMPHATICS:  no cervical lymphadenopathy, no supraclavicular lymphadenopathy, no axillary lymphadenopathy and no inguinal lymphadenopathy  LUNGS:  No increased work of breathing, good air exchange, clear to auscultation bilaterally, no crackles or wheezing  CARDIOVASCULAR:  , regular rate and rhythm, normal S1 and S2, no S3 or S4, and no murmur noted  ABDOMEN:  No scars, normal bowel sounds, soft, non-distended, non-tender, no masses palpated, no hepatosplenomegally  MUSCULOSKELETAL:  There is no redness, warmth, or swelling of the joints. EXTREMETIES: No clubbing cynosis or edema  NEUROLOGIC:  L hand  strength is 3/5 with 3/5 MS with abduction and adduction.  Pupils equal and reactive to light bilaterally, smile is symmetrical, no tongue deviation   SKIN:  no bruising or bleeding      Data      Recent Labs     20  0348 20  0607 20  0524   WBC 9.5 7.5 7.8   HGB 8.4* 8.2* 7.6*   HCT 24.5* 23.9* 22.4*   * 698* 796*   MCV 97.6 97.2 97.6 Recent Labs     08/21/20  0348 08/22/20  0607 08/23/20  0524    138 139   K 4.4 4.2 3.9    102 101   CO2 25 27 28   BUN 10 10 8   CREATININE 0.6* 0.6* 0.6*     No results for input(s): AST, ALT, ALB, BILIDIR, BILITOT, ALKPHOS in the last 72 hours. Magnesium:    Lab Results   Component Value Date    MG 1.50 08/17/2020    MG 1.70 08/15/2020    MG 2.10 07/30/2020         Problem List  Patient Active Problem List   Diagnosis    S[facility] crisis ([facility] [address]    Asthma    Sickle cell crisis ([facility] [address] [facility]    Sepsis ([facility] [address]       ASSESSMENT AND PLAN:    Sickle cell crisis:  -No evidence of acute chest pain syndrome- no stroke. -This is minimally better  -We will try blood products, IV fluids, Hydrea and analgesia  -There is no definite precipitating cause  - s/p red cell exchange on 8-18     Pain:  - cont PCA at current settings   -His pain is better today  -Increased his methadone to 15 mg p.o. 3 times daily on 8/21/2020. This drug will reach steady state in 3-5 days due to its long half life.  -I asked him to use only Oxyodone IR for breakthru yesterday but I do not see that he used it at all.   It may take a few days to see the effect of increasing his Methadone.  -I think this will be the best way to get him off the PCA  -I do not think that just continuing the PCA and the same dose of methadone is going to do it    Access  - PICC line  - likely needs outpatient PORT    Pain:  - Cont Methadone and PCA at current settings.   -I do not think that we can increase his pain medications for concern of respiratory depression    Infectious disease:  -COVID testing negative     L arm weakness  - CTA head negative for stroke  - MRI no signs of AVN or abscess or drainage effusion   - sling ordered for immobilization     Fever  - pan cx, chest xray -Both are normal  - procal Mildly elevated 0.51 lactic acid is normal  - cont Rocephin and Zithromax - blood cx NGTD- stopped 8-17   -His fever has subsided and it may be due to his crisis    Hypomagnesemia  -replaced       ONCOLOGIC DISPOSITION: TBD     -Once we can get his IV pain medication stopped- weaning- concerned for dependence.    -He will follow-up with Dr. Stella Dias  - s/p apheresis 8-18   - Likely here another 2-3 days   -Dr. Shirley Conti will discuss Ryan Kavya and other therapeutic measures with her to see if we can help diminish his sickle cell crisis    Te Mauro MD  May be reached through [address] [place] [address]

## 2020-08-24 LAB
ANION GAP SERPL CALCULATED.3IONS-SCNC: 8 MMOL/L (ref 3–16)
BASOPHILS ABSOLUTE: 0.1 K/UL (ref 0–0.2)
BASOPHILS RELATIVE PERCENT: 1.2 %
BUN BLDV-MCNC: 6 MG/DL (ref 7–20)
C-REACTIVE PROTEIN: 34.6 MG/L (ref 0–5.1)
CALCIUM SERPL-MCNC: 9.6 MG/DL (ref 8.3–10.6)
CHLORIDE BLD-SCNC: 103 MMOL/L (ref 99–110)
CO2: 29 MMOL/L (ref 21–32)
CREAT SERPL-MCNC: 0.6 MG/DL (ref 0.9–1.3)
EOSINOPHILS ABSOLUTE: 0.1 K/UL (ref 0–0.6)
EOSINOPHILS RELATIVE PERCENT: 1.7 %
GFR AFRICAN AMERICAN: >60
GFR NON-AFRICAN AMERICAN: >60
GLUCOSE BLD-MCNC: 83 MG/DL (ref 70–99)
HCT VFR BLD CALC: 21.2 % (ref 40.5–52.5)
HEMATOLOGY PATH CONSULT: NO
HEMOGLOBIN: 7.2 G/DL (ref 13.5–17.5)
LYMPHOCYTES ABSOLUTE: 3.1 K/UL (ref 1–5.1)
LYMPHOCYTES RELATIVE PERCENT: 36.9 %
MCH RBC QN AUTO: 33.2 PG (ref 26–34)
MCHC RBC AUTO-ENTMCNC: 33.8 G/DL (ref 31–36)
MCV RBC AUTO: 98.2 FL (ref 80–100)
MONOCYTES ABSOLUTE: 0.9 K/UL (ref 0–1.3)
MONOCYTES RELATIVE PERCENT: 11 %
NEUTROPHILS ABSOLUTE: 4.1 K/UL (ref 1.7–7.7)
NEUTROPHILS RELATIVE PERCENT: 49.2 %
PDW BLD-RTO: 19.4 % (ref 12.4–15.4)
PLATELET # BLD: 802 K/UL (ref 135–450)
PLATELET SLIDE REVIEW: ABNORMAL
PMV BLD AUTO: 8.5 FL (ref 5–10.5)
POTASSIUM REFLEX MAGNESIUM: 3.9 MMOL/L (ref 3.5–5.1)
RBC # BLD: 2.16 M/UL (ref 4.2–5.9)
SEDIMENTATION RATE, ERYTHROCYTE: 78 MM/HR (ref 0–15)
SLIDE REVIEW: ABNORMAL
SODIUM BLD-SCNC: 140 MMOL/L (ref 136–145)
WBC # BLD: 8.3 K/UL (ref 4–11)

## 2020-08-24 PROCEDURE — 94770 HC ETCO2 MONITOR DAILY: CPT

## 2020-08-24 PROCEDURE — 85652 RBC SED RATE AUTOMATED: CPT

## 2020-08-24 PROCEDURE — 6360000002 HC RX W HCPCS: Performed by: NURSE PRACTITIONER

## 2020-08-24 PROCEDURE — 2580000003 HC RX 258: Performed by: NURSE PRACTITIONER

## 2020-08-24 PROCEDURE — 36592 COLLECT BLOOD FROM PICC: CPT

## 2020-08-24 PROCEDURE — 2700000000 HC OXYGEN THERAPY PER DAY

## 2020-08-24 PROCEDURE — 94761 N-INVAS EAR/PLS OXIMETRY MLT: CPT

## 2020-08-24 PROCEDURE — 86140 C-REACTIVE PROTEIN: CPT

## 2020-08-24 PROCEDURE — 80048 BASIC METABOLIC PNL TOTAL CA: CPT

## 2020-08-24 PROCEDURE — 2580000003 HC RX 258: Performed by: FAMILY MEDICINE

## 2020-08-24 PROCEDURE — 1200000000 HC SEMI PRIVATE

## 2020-08-24 PROCEDURE — 6370000000 HC RX 637 (ALT 250 FOR IP): Performed by: INTERNAL MEDICINE

## 2020-08-24 PROCEDURE — 36415 COLL VENOUS BLD VENIPUNCTURE: CPT

## 2020-08-24 PROCEDURE — 6370000000 HC RX 637 (ALT 250 FOR IP): Performed by: NURSE PRACTITIONER

## 2020-08-24 PROCEDURE — 85025 COMPLETE CBC W/AUTO DIFF WBC: CPT

## 2020-08-24 RX ADMIN — HYDROXYUREA 1000 MG: 500 CAPSULE ORAL at 20:19

## 2020-08-24 RX ADMIN — METHADONE HYDROCHLORIDE 15 MG: 5 TABLET ORAL at 20:20

## 2020-08-24 RX ADMIN — SODIUM CHLORIDE: 9 INJECTION, SOLUTION INTRAVENOUS at 18:27

## 2020-08-24 RX ADMIN — HYDROXYUREA 1000 MG: 500 CAPSULE ORAL at 09:01

## 2020-08-24 RX ADMIN — Medication: at 13:09

## 2020-08-24 RX ADMIN — SODIUM CHLORIDE: 9 INJECTION, SOLUTION INTRAVENOUS at 09:01

## 2020-08-24 RX ADMIN — FOLIC ACID 2 MG: 1 TABLET ORAL at 09:00

## 2020-08-24 RX ADMIN — BACITRACIN ZINC, NEOMYCIN SULFATE, AND POLYMYXIN B SULFATE: 400; 3.5; 5 OINTMENT TOPICAL at 20:18

## 2020-08-24 RX ADMIN — BACITRACIN ZINC, NEOMYCIN SULFATE, AND POLYMYXIN B SULFATE: 400; 3.5; 5 OINTMENT TOPICAL at 09:30

## 2020-08-24 RX ADMIN — METHADONE HYDROCHLORIDE 15 MG: 5 TABLET ORAL at 09:00

## 2020-08-24 RX ADMIN — OXYCODONE HYDROCHLORIDE 15 MG: 15 TABLET ORAL at 22:10

## 2020-08-24 RX ADMIN — OXYCODONE HYDROCHLORIDE 15 MG: 15 TABLET ORAL at 16:44

## 2020-08-24 RX ADMIN — Medication 10 ML: at 09:30

## 2020-08-24 RX ADMIN — METHADONE HYDROCHLORIDE 15 MG: 5 TABLET ORAL at 14:32

## 2020-08-24 ASSESSMENT — PAIN SCALES - GENERAL
PAINLEVEL_OUTOF10: 7
PAINLEVEL_OUTOF10: 7
PAINLEVEL_OUTOF10: 6
PAINLEVEL_OUTOF10: 7
PAINLEVEL_OUTOF10: 7
PAINLEVEL_OUTOF10: 8

## 2020-08-24 NOTE — PLAN OF CARE
Problem: Pain:  Goal: Pain level will decrease  Description: Pain level will decrease  Outcome: Ongoing     Pt's pain is under control. PCA pump working appropriately. Call light and beside table within reach. Denies any other needs at this time. Will continue to monitor.

## 2020-08-24 NOTE — PROGRESS NOTES
Shift assessment completed per documentation. Pt A&Ox4. VSS. Pt awake in bed. Pt c/o acute left arm pain 7/10. Heat packs applied. Bilateral lung sounds clear. Bed locked and in lowest position. Nonskid slippers on. Side rails up 2/4. Call light and personal belongings within reach. Will continue to monitor.

## 2020-08-24 NOTE — PROGRESS NOTES
ONCOLOGY HEMATOLOGY CARE PROGRESS NOTE      SUBJECTIVE:     He feels only slightly better today. Still will L elbow pain but feels it is actually improving. ROS:   The remaining 10 point review of symptoms is unremarkable. OBJECTIVE        Physical    VITALS:  /67   Pulse 90   Temp 98.1 °F (36.7 °C) (Oral)   Resp 16   Ht 5' 8\" (1.727 m)   Wt 179 lb 0.2 oz (81.2 kg)   SpO2 100%   BMI 27.22 kg/m²   TEMPERATURE:  Current - Temp: 98.1 °F (36.7 °C); Max - Temp  Av.4 °F (36.9 °C)  Min: 98.1 °F (36.7 °C)  Max: 98.6 °F (37 °C)  PULSE OXIMETRY RANGE: SpO2  Av.8 %  Min: 99 %  Max: 100 %  24HR INTAKE/OUTPUT:      Intake/Output Summary (Last 24 hours) at 2020 1114  Last data filed at 2020 5822  Gross per 24 hour   Intake 3719.55 ml   Output 2375 ml   Net 1344.55 ml       CONSTITUTIONAL:  awake, alert, cooperative, no apparent distress, HEENT oral pharynx , no scleral icterus. Likely small skin infection under the right orbit. HEMATOLOGIC/LYMPHATICS:  no cervical lymphadenopathy, no supraclavicular lymphadenopathy, no axillary lymphadenopathy and no inguinal lymphadenopathy  LUNGS:  No increased work of breathing, good air exchange, clear to auscultation bilaterally, no crackles or wheezing  CARDIOVASCULAR:  , regular rate and rhythm, normal S1 and S2, no S3 or S4, and no murmur noted  ABDOMEN:  No scars, normal bowel sounds, soft, non-distended, non-tender, no masses palpated, no hepatosplenomegally  MUSCULOSKELETAL:  There is no redness, warmth, or swelling of the joints. EXTREMETIES: No clubbing cynosis or edema  NEUROLOGIC:  L hand  strength is 3/5 with 3/5 MS with abduction and adduction.  Pupils equal and reactive to light bilaterally, smile is symmetrical, no tongue deviation   SKIN:  no bruising or bleeding      Data      Recent Labs     20  0607 20  0524 20  0551   WBC 7.5 7.8 8.3   HGB 8.2* 7.6* 7.2*   HCT 23.9* 22.4* 21.2*   * 796* 802*   MCV 97.2 97.6 98.2        Recent Labs     08/22/20  0607 08/23/20  0524 08/24/20  0551    139 140   K 4.2 3.9 3.9    101 103   CO2 27 28 29   BUN 10 8 6*   CREATININE 0.6* 0.6* 0.6*     No results for input(s): AST, ALT, ALB, BILIDIR, BILITOT, ALKPHOS in the last 72 hours. Magnesium:    Lab Results   Component Value Date    MG 1.50 08/17/2020    MG 1.70 08/15/2020    MG 2.10 07/30/2020         Problem List  Patient Active Problem List   Diagnosis    [facility] cell [facility] crisis [facility] [address]    Asthma    Sickle cell crisis ([facility] [address] [facility]    Sepsis ([facility]       ASSESSMENT AND PLAN:    Sickle cell crisis:  -No evidence of acute chest pain syndrome- no stroke. -This is minimally better  -We will try blood products, IV fluids, Hydrea and analgesia  -There is no definite precipitating cause  - s/p red cell exchange on 8-18   - Pull apheresis line today     Pain:  - cont PCA at current settings   -His pain is better today  -Increased his methadone to 15 mg p.o. 3 times daily on 8/21/2020. This drug will reach steady state in 3-5 days due to its long half life.  -I asked him to use only Oxyodone IR for breakthru yesterday but I do not see that he used it at all.   It may take a few days to see the effect of increasing his Methadone.  -I think this will be the best way to get him off the PCA      Access  - PICC line  - likely needs outpatient PORT    Pain:  - Cont Methadone and PCA at current settings.   -I do not think that we can increase his pain medications for concern of respiratory depression  - Start to wean- discussed with patient to try and limit uses of PCA and use Oxy IR in prep for dc     Infectious disease:  -COVID testing negative     L arm weakness  - CTA head negative for stroke  - MRI no signs of AVN or abscess or drainage effusion   - sling ordered for immobilization     Fever  - pan cx, chest xray -Both are normal  - procal Mildly elevated 0.51 lactic acid is normal  - cont

## 2020-08-24 NOTE — PROGRESS NOTES
Removed pts Vas Cath per order. Pressure held, no bleeding issues. Instructed patient to leave bandage on for 24 hrs.

## 2020-08-24 NOTE — PROGRESS NOTES
Pt assessment completed and charted. VSS. Pt a/o x4. PCA pump working. appropriately. Bed in lowest position and wheels locked. Call light within reach. Bedside table within reach. Non-skid footwear in place. Pt denies any other needs at this time. Pt calls out appropriately. Will continue to monitor.

## 2020-08-25 LAB
ABO/RH: NORMAL
ANION GAP SERPL CALCULATED.3IONS-SCNC: 6 MMOL/L (ref 3–16)
ANTIBODY SCREEN: NORMAL
BASOPHILS ABSOLUTE: 0.1 K/UL (ref 0–0.2)
BASOPHILS RELATIVE PERCENT: 1.5 %
BLOOD BANK DISPENSE STATUS: NORMAL
BLOOD BANK DISPENSE STATUS: NORMAL
BLOOD BANK PRODUCT CODE: NORMAL
BLOOD BANK PRODUCT CODE: NORMAL
BPU ID: NORMAL
BPU ID: NORMAL
BUN BLDV-MCNC: 5 MG/DL (ref 7–20)
CALCIUM SERPL-MCNC: 9.4 MG/DL (ref 8.3–10.6)
CHLORIDE BLD-SCNC: 101 MMOL/L (ref 99–110)
CO2: 31 MMOL/L (ref 21–32)
CREAT SERPL-MCNC: 0.6 MG/DL (ref 0.9–1.3)
DAT IGG CAPTURE: NORMAL
DESCRIPTION BLOOD BANK: NORMAL
DESCRIPTION BLOOD BANK: NORMAL
EOSINOPHILS ABSOLUTE: 0.2 K/UL (ref 0–0.6)
EOSINOPHILS RELATIVE PERCENT: 1.8 %
GFR AFRICAN AMERICAN: >60
GFR NON-AFRICAN AMERICAN: >60
GLUCOSE BLD-MCNC: 149 MG/DL (ref 70–99)
HAPTOGLOBIN: <10 MG/DL (ref 30–200)
HCT VFR BLD CALC: 19.8 % (ref 40.5–52.5)
HCT VFR BLD CALC: 20.2 % (ref 40.5–52.5)
HCT VFR BLD CALC: 22.4 % (ref 40.5–52.5)
HEMATOLOGY PATH CONSULT: NO
HEMOGLOBIN ELECTROPHORESIS: NORMAL
HEMOGLOBIN: 6.8 G/DL (ref 13.5–17.5)
HEMOGLOBIN: 7.5 G/DL (ref 13.5–17.5)
HGB ELECTROPHORESIS INTERP: NORMAL
IMMATURE RETIC FRACT: 0.35 (ref 0.21–0.37)
LACTATE DEHYDROGENASE: 687 U/L (ref 100–190)
LYMPHOCYTES ABSOLUTE: 3.4 K/UL (ref 1–5.1)
LYMPHOCYTES RELATIVE PERCENT: 40.4 %
MCH RBC QN AUTO: 33.1 PG (ref 26–34)
MCHC RBC AUTO-ENTMCNC: 33.5 G/DL (ref 31–36)
MCV RBC AUTO: 98.6 FL (ref 80–100)
MONOCYTES ABSOLUTE: 0.7 K/UL (ref 0–1.3)
MONOCYTES RELATIVE PERCENT: 8.4 %
NEUTROPHILS ABSOLUTE: 4 K/UL (ref 1.7–7.7)
NEUTROPHILS RELATIVE PERCENT: 47.9 %
PDW BLD-RTO: 19.8 % (ref 12.4–15.4)
PLATELET # BLD: 955 K/UL (ref 135–450)
PLATELET SLIDE REVIEW: ABNORMAL
PMV BLD AUTO: 8.6 FL (ref 5–10.5)
POTASSIUM REFLEX MAGNESIUM: 3.7 MMOL/L (ref 3.5–5.1)
RBC # BLD: 2.05 M/UL (ref 4.2–5.9)
RETICULOCYTE ABSOLUTE COUNT: 0.02 M/UL
RETICULOCYTE COUNT PCT: 0.94 % (ref 0.5–2.18)
SLIDE REVIEW: ABNORMAL
SODIUM BLD-SCNC: 138 MMOL/L (ref 136–145)
URIC ACID, SERUM: 5.5 MG/DL (ref 3.5–7.2)
WBC # BLD: 8.5 K/UL (ref 4–11)

## 2020-08-25 PROCEDURE — 86923 COMPATIBILITY TEST ELECTRIC: CPT

## 2020-08-25 PROCEDURE — 86901 BLOOD TYPING SEROLOGIC RH(D): CPT

## 2020-08-25 PROCEDURE — 85025 COMPLETE CBC W/AUTO DIFF WBC: CPT

## 2020-08-25 PROCEDURE — 85045 AUTOMATED RETICULOCYTE COUNT: CPT

## 2020-08-25 PROCEDURE — 6360000002 HC RX W HCPCS: Performed by: INTERNAL MEDICINE

## 2020-08-25 PROCEDURE — 2580000003 HC RX 258: Performed by: FAMILY MEDICINE

## 2020-08-25 PROCEDURE — 2700000000 HC OXYGEN THERAPY PER DAY

## 2020-08-25 PROCEDURE — 2580000003 HC RX 258: Performed by: NURSE PRACTITIONER

## 2020-08-25 PROCEDURE — 6370000000 HC RX 637 (ALT 250 FOR IP): Performed by: INTERNAL MEDICINE

## 2020-08-25 PROCEDURE — 94770 HC ETCO2 MONITOR DAILY: CPT

## 2020-08-25 PROCEDURE — 6360000002 HC RX W HCPCS: Performed by: NURSE PRACTITIONER

## 2020-08-25 PROCEDURE — 80048 BASIC METABOLIC PNL TOTAL CA: CPT

## 2020-08-25 PROCEDURE — 1200000000 HC SEMI PRIVATE

## 2020-08-25 PROCEDURE — 85018 HEMOGLOBIN: CPT

## 2020-08-25 PROCEDURE — 85660 RBC SICKLE CELL TEST: CPT

## 2020-08-25 PROCEDURE — 83010 ASSAY OF HAPTOGLOBIN QUANT: CPT

## 2020-08-25 PROCEDURE — 94761 N-INVAS EAR/PLS OXIMETRY MLT: CPT

## 2020-08-25 PROCEDURE — 84550 ASSAY OF BLOOD/URIC ACID: CPT

## 2020-08-25 PROCEDURE — 83615 LACTATE (LD) (LDH) ENZYME: CPT

## 2020-08-25 PROCEDURE — 86850 RBC ANTIBODY SCREEN: CPT

## 2020-08-25 PROCEDURE — P9016 RBC LEUKOCYTES REDUCED: HCPCS

## 2020-08-25 PROCEDURE — 6370000000 HC RX 637 (ALT 250 FOR IP): Performed by: NURSE PRACTITIONER

## 2020-08-25 PROCEDURE — 86880 COOMBS TEST DIRECT: CPT

## 2020-08-25 PROCEDURE — 85014 HEMATOCRIT: CPT

## 2020-08-25 PROCEDURE — 86900 BLOOD TYPING SEROLOGIC ABO: CPT

## 2020-08-25 RX ORDER — 0.9 % SODIUM CHLORIDE 0.9 %
20 INTRAVENOUS SOLUTION INTRAVENOUS ONCE
Status: COMPLETED | OUTPATIENT
Start: 2020-08-25 | End: 2020-08-25

## 2020-08-25 RX ORDER — NAPROXEN 250 MG/1
500 TABLET ORAL 2 TIMES DAILY WITH MEALS
Status: DISCONTINUED | OUTPATIENT
Start: 2020-08-25 | End: 2020-08-26 | Stop reason: HOSPADM

## 2020-08-25 RX ORDER — CHOLECALCIFEROL (VITAMIN D3) 125 MCG
1000 CAPSULE ORAL DAILY
Status: DISCONTINUED | OUTPATIENT
Start: 2020-08-25 | End: 2020-08-26 | Stop reason: HOSPADM

## 2020-08-25 RX ORDER — HYDROXYUREA 500 MG/1
1500 CAPSULE ORAL 2 TIMES DAILY
Status: DISCONTINUED | OUTPATIENT
Start: 2020-08-25 | End: 2020-08-26 | Stop reason: HOSPADM

## 2020-08-25 RX ORDER — ACETAMINOPHEN 325 MG/1
650 TABLET ORAL ONCE
Status: COMPLETED | OUTPATIENT
Start: 2020-08-25 | End: 2020-08-25

## 2020-08-25 RX ORDER — SENNA AND DOCUSATE SODIUM 50; 8.6 MG/1; MG/1
2 TABLET, FILM COATED ORAL 2 TIMES DAILY
Status: DISCONTINUED | OUTPATIENT
Start: 2020-08-25 | End: 2020-08-26 | Stop reason: HOSPADM

## 2020-08-25 RX ORDER — DIPHENHYDRAMINE HCL 25 MG
25 TABLET ORAL ONCE
Status: COMPLETED | OUTPATIENT
Start: 2020-08-25 | End: 2020-08-25

## 2020-08-25 RX ADMIN — OXYCODONE HYDROCHLORIDE 15 MG: 15 TABLET ORAL at 21:03

## 2020-08-25 RX ADMIN — HYDROXYUREA 1500 MG: 500 CAPSULE ORAL at 20:56

## 2020-08-25 RX ADMIN — BACITRACIN ZINC, NEOMYCIN SULFATE, AND POLYMYXIN B SULFATE: 400; 3.5; 5 OINTMENT TOPICAL at 08:58

## 2020-08-25 RX ADMIN — HYDROXYUREA 1500 MG: 500 CAPSULE ORAL at 10:49

## 2020-08-25 RX ADMIN — Medication 10 ML: at 21:06

## 2020-08-25 RX ADMIN — Medication: at 16:27

## 2020-08-25 RX ADMIN — METHADONE HYDROCHLORIDE 15 MG: 5 TABLET ORAL at 21:03

## 2020-08-25 RX ADMIN — OXYCODONE HYDROCHLORIDE 15 MG: 15 TABLET ORAL at 03:40

## 2020-08-25 RX ADMIN — NAPROXEN 500 MG: 250 TABLET ORAL at 08:46

## 2020-08-25 RX ADMIN — OXYCODONE HYDROCHLORIDE 15 MG: 15 TABLET ORAL at 10:03

## 2020-08-25 RX ADMIN — ACETAMINOPHEN 650 MG: 325 TABLET ORAL at 10:50

## 2020-08-25 RX ADMIN — DIPHENHYDRAMINE HCL 25 MG: 25 TABLET ORAL at 10:50

## 2020-08-25 RX ADMIN — CYANOCOBALAMIN TAB 500 MCG 1000 MCG: 500 TAB at 16:05

## 2020-08-25 RX ADMIN — DOCUSATE SODIUM 50 MG AND SENNOSIDES 8.6 MG 2 TABLET: 8.6; 5 TABLET, FILM COATED ORAL at 08:46

## 2020-08-25 RX ADMIN — SODIUM CHLORIDE 20 ML: 9 INJECTION, SOLUTION INTRAVENOUS at 08:57

## 2020-08-25 RX ADMIN — METHADONE HYDROCHLORIDE 15 MG: 5 TABLET ORAL at 08:46

## 2020-08-25 RX ADMIN — Medication 10 ML: at 08:58

## 2020-08-25 RX ADMIN — SODIUM CHLORIDE: 9 INJECTION, SOLUTION INTRAVENOUS at 03:40

## 2020-08-25 RX ADMIN — ALTEPLASE 1 MG: 2.2 INJECTION, POWDER, LYOPHILIZED, FOR SOLUTION INTRAVENOUS at 21:53

## 2020-08-25 RX ADMIN — FOLIC ACID 2 MG: 1 TABLET ORAL at 08:46

## 2020-08-25 RX ADMIN — METHADONE HYDROCHLORIDE 15 MG: 5 TABLET ORAL at 16:05

## 2020-08-25 RX ADMIN — BACITRACIN ZINC, NEOMYCIN SULFATE, AND POLYMYXIN B SULFATE: 400; 3.5; 5 OINTMENT TOPICAL at 21:08

## 2020-08-25 RX ADMIN — SODIUM CHLORIDE: 9 INJECTION, SOLUTION INTRAVENOUS at 10:03

## 2020-08-25 RX ADMIN — NAPROXEN 500 MG: 250 TABLET ORAL at 17:50

## 2020-08-25 ASSESSMENT — PAIN SCALES - GENERAL
PAINLEVEL_OUTOF10: 8
PAINLEVEL_OUTOF10: 7
PAINLEVEL_OUTOF10: 5
PAINLEVEL_OUTOF10: 7
PAINLEVEL_OUTOF10: 7
PAINLEVEL_OUTOF10: 6
PAINLEVEL_OUTOF10: 6

## 2020-08-25 NOTE — CARE COORDINATION
Chart reviewed day 19. Spoke with Wilton Avila NP with oncology. Reported patient with worsening labs. Changing medications around. Will likely be here a few more days. IPTA.  Rigo Hamilton RN

## 2020-08-25 NOTE — PROGRESS NOTES
ONCOLOGY HEMATOLOGY CARE PROGRESS NOTE      SUBJECTIVE:     Hgb 6.8. Elbow feels better tho. He wants to leave tomorrow. L arm strength improved per patient. He is alert and fully awake on the PCA this AM.         ROS:   The remaining 10 point review of symptoms is unremarkable. OBJECTIVE        Physical    VITALS:  BP (!) 112/59   Pulse 78   Temp 98.3 °F (36.8 °C) (Oral)   Resp 14   Ht 5' 8\" (1.727 m)   Wt 179 lb 0.2 oz (81.2 kg)   SpO2 100%   BMI 27.22 kg/m²   TEMPERATURE:  Current - Temp: 98.3 °F (36.8 °C); Max - Temp  Av.1 °F (36.7 °C)  Min: 97.9 °F (36.6 °C)  Max: 98.3 °F (36.8 °C)  PULSE OXIMETRY RANGE: SpO2  Av %  Min: 100 %  Max: 100 %  24HR INTAKE/OUTPUT:      Intake/Output Summary (Last 24 hours) at 2020 1126  Last data filed at 2020 0553  Gross per 24 hour   Intake 2615.67 ml   Output 1620 ml   Net 995.67 ml       CONSTITUTIONAL:  awake, alert, cooperative, no apparent distress, HEENT oral pharynx , no scleral icterus. Likely small skin infection under the right orbit. HEMATOLOGIC/LYMPHATICS:  no cervical lymphadenopathy, no supraclavicular lymphadenopathy, no axillary lymphadenopathy and no inguinal lymphadenopathy  LUNGS:  No increased work of breathing, good air exchange, clear to auscultation bilaterally, no crackles or wheezing  CARDIOVASCULAR:  , regular rate and rhythm, normal S1 and S2, no S3 or S4, and no murmur noted  ABDOMEN:  No scars, normal bowel sounds, soft, non-distended, non-tender, no masses palpated, no hepatosplenomegally  MUSCULOSKELETAL:  There is no redness, warmth, or swelling of the joints. EXTREMETIES: No clubbing cynosis or edema  NEUROLOGIC:  L hand  strength is 3/5 with 3/5 MS with abduction and adduction.  Pupils equal and reactive to light bilaterally, smile is symmetrical, no tongue deviation   SKIN:  no bruising or bleeding      Data      Recent Labs     20  0524 20  0551 20  5510 08/25/20  0848   WBC 7.8 8.3 8.5  --    HGB 7.6* 7.2* 6.8*  --    HCT 22.4* 21.2* 20.2* 19.8*   * 802* 955*  --    MCV 97.6 98.2 98.6  --         Recent Labs     08/23/20  0524 08/24/20  0551 08/25/20  0552    140 138   K 3.9 3.9 3.7    103 101   CO2 28 29 31   BUN 8 6* 5*   CREATININE 0.6* 0.6* 0.6*     No results for input(s): AST, ALT, ALB, BILIDIR, BILITOT, ALKPHOS in the last 72 hours. Magnesium:    Lab Results   Component Value Date    MG 1.50 08/17/2020    MG 1.70 08/15/2020    MG 2.10 07/30/2020         Problem List  Patient Active Problem List   Diagnosis    Sick[facility] pain crisis ([facility]    Asthma    Sickle cell crisis ([facility]    Sepsis ([facility]       ASSESSMENT AND PLAN:    Sickle cell crisis:  -No evidence of acute chest pain syndrome- no stroke. -This is minimally better  -We will try blood products, IV fluids, Hydrea and analgesia  -There is no definite precipitating cause  - s/p red cell exchange on 8-18   - Pull apheresis line 8/25   - Increase Hydrea to 1500 BID   - Start B12 replacement for borderline low counts   - Check hemolysis parameters and uric acid levels  - Transfuse 2 units PRBC toady for Hgb 6.8     Pain:  - cont PCA at current settings   -His pain is better today  -Increased his methadone to 15 mg p.o. 3 times daily on 8/21/2020. This drug will reach steady state in 3-5 days due to its long half life.  -I asked him to use only Oxyodone IR for breakthru yesterday but I do not see that he used it at all.   It may take a few days to see the effect of increasing his Methadone.  -I think this will be the best way to get him off the PCA      Access  - PICC line  - likely needs outpatient PORT    Pain:  - Cont Methadone and PCA at current settings.   -I do not think that we can increase his pain medications for concern of respiratory depression  - Start to wean- discussed with patient to try and limit uses of PCA and use Oxy IR in prep for dc     Infectious disease:  -COVID testing negative     L arm weakness  - CTA head negative for stroke  - MRI no signs of AVN or abscess or drainage effusion   - sling ordered for immobilization   - improving - no induration or swelling noted on clinical exam     Fever  - pan cx, chest xray -Both are normal  - procal Mildly elevated 0.51 lactic acid is normal  - cont Rocephin and Zithromax - blood cx NGTD- stopped 8-17   -His fever has subsided and it may be due to his crisis    Hypomagnesemia  -replaced     Thrombocytosis  - likely reactive   - check SED RATE and CRP - elevated       ONCOLOGIC DISPOSITION: DC in 1-2 days     -Once we can get his IV pain medication stopped- weaning- concerned for dependence.  DC TOMORROW   -He will follow-up with Dr. Emely Abad  - s/p apheresis 8-18 for prolonged pain crisis   - Christy Rivera PENDING CBC   -Dr. Nunu Alfredo will discuss Vinay Das and other therapeutic measures with her to see if we can help diminish his sickle cell crisis    Zaria Finney, LYNNETTE LOVE RosstonMAYA   May be reached through [address] [place] [address]

## 2020-08-25 NOTE — PROGRESS NOTES
First unit PRBC's started at this time. Vitals stable as charted. Verified with 2 RN's. Pt educated to notify of any s\s reaction. This RN to bedside now per protocol.

## 2020-08-25 NOTE — PROGRESS NOTES
ONCOLOGY HEMATOLOGY CARE PROGRESS NOTE      SUBJECTIVE:     Hgb 6.8. Elbow feels better tho. He wants to leave tomorrow. L arm strength improved per patient. He is alert and fully awake on the PCA this AM.         ROS:   The remaining 10 point review of symptoms is unremarkable. OBJECTIVE        Physical    VITALS:  BP (!) 112/59   Pulse 78   Temp 98.3 °F (36.8 °C) (Oral)   Resp 14   Ht 5' 8\" (1.727 m)   Wt 179 lb 0.2 oz (81.2 kg)   SpO2 100%   BMI 27.22 kg/m²   TEMPERATURE:  Current - Temp: 98.3 °F (36.8 °C); Max - Temp  Av.1 °F (36.7 °C)  Min: 97.9 °F (36.6 °C)  Max: 98.3 °F (36.8 °C)  PULSE OXIMETRY RANGE: SpO2  Av %  Min: 100 %  Max: 100 %  24HR INTAKE/OUTPUT:      Intake/Output Summary (Last 24 hours) at 2020 1129  Last data filed at 2020 0553  Gross per 24 hour   Intake 2615.67 ml   Output 1620 ml   Net 995.67 ml       CONSTITUTIONAL:  awake, alert, cooperative, no apparent distress, HEENT oral pharynx , no scleral icterus. Likely small skin infection under the right orbit. HEMATOLOGIC/LYMPHATICS:  no cervical lymphadenopathy, no supraclavicular lymphadenopathy, no axillary lymphadenopathy and no inguinal lymphadenopathy  LUNGS:  No increased work of breathing, good air exchange, clear to auscultation bilaterally, no crackles or wheezing  CARDIOVASCULAR:  , regular rate and rhythm, normal S1 and S2, no S3 or S4, and no murmur noted  ABDOMEN:  No scars, normal bowel sounds, soft, non-distended, non-tender, no masses palpated, no hepatosplenomegally  MUSCULOSKELETAL:  There is no redness, warmth, or swelling of the joints. EXTREMETIES: No clubbing cynosis or edema  NEUROLOGIC:  L hand  strength is 3/5 with 3/5 MS with abduction and adduction.  Pupils equal and reactive to light bilaterally, smile is symmetrical, no tongue deviation   SKIN:  no bruising or bleeding      Data      Recent Labs     20  0524 20  0551 20  6741 PCA and use Oxy IR in prep for dc     Infectious disease:  -COVID testing negative     L arm weakness  - CTA head negative for stroke  - MRI no signs of AVN or abscess or drainage effusion   - sling ordered for immobilization   - improving - no induration or swelling noted on clinical exam     Fever  - pan cx, chest xray -Both are normal  - procal Mildly elevated 0.51 lactic acid is normal  - cont Rocephin and Zithromax - blood cx NGTD- stopped 8-17   -His fever has subsided and it may be due to his crisis    Hypomagnesemia  -replaced     Thrombocytosis  - likely reactive   - check SED RATE and CRP - elevated       ONCOLOGIC DISPOSITION: DC in 1-2 days     -Once we can get his IV pain medication stopped- weaning- concerned for dependence.  DC TOMORROW   -He will follow-up with Dr. Nolan Wei  - s/p apheresis 8-18 for prolonged pain crisis   - Shamar Cano PENDING CBC   -Dr. Norma Azevedo will discuss Anice Fresh and other therapeutic measures with her to see if we can help diminish his sickle cell crisis    Betsy Camacho, CNP   [facility]   May be reached through [address] [place] [address]

## 2020-08-25 NOTE — PROGRESS NOTES
Pt asked RN to go get snacks for patient from [place]. Requesting chips and cookies. Was able to find chips of choice for $1.20. Came back upstairs to talk to patient about cookie choices. Made second trip to \"[employer]\" to get requested \"2 bags of famous [employer] cookies\" for $2.26    Charged both to his credit card with permission from patient. Snacks and credit card returned to patient directly after purchase. Patient has no slept tonight. VSS. PCA- demand only. Will continue to monitor.

## 2020-08-25 NOTE — PLAN OF CARE
Pt remained free of falls. Call light within reach. Ambulates independently. Non skid footwear in place. Bed locked and in lowest position. Will continue to monitor.

## 2020-08-25 NOTE — PROGRESS NOTES
Assessment completed and documented. VSS. A/ox4. C/o 6/10 in left elbow, no swelling noted. Heat applied. PCA pump infusing through PICC. Ambulates independently. Denies further needs at this time. Bed locked and in lowest position. Bedside table and call light within reach. Will continue to monitor.

## 2020-08-26 VITALS
HEART RATE: 78 BPM | BODY MASS INDEX: 27.68 KG/M2 | RESPIRATION RATE: 16 BRPM | HEIGHT: 68 IN | SYSTOLIC BLOOD PRESSURE: 138 MMHG | DIASTOLIC BLOOD PRESSURE: 60 MMHG | OXYGEN SATURATION: 95 % | TEMPERATURE: 98.1 F | WEIGHT: 182.6 LBS

## 2020-08-26 LAB
ANION GAP SERPL CALCULATED.3IONS-SCNC: 10 MMOL/L (ref 3–16)
BASOPHILS ABSOLUTE: 0.1 K/UL (ref 0–0.2)
BASOPHILS RELATIVE PERCENT: 1.9 %
BUN BLDV-MCNC: 5 MG/DL (ref 7–20)
CALCIUM SERPL-MCNC: 9.2 MG/DL (ref 8.3–10.6)
CHLORIDE BLD-SCNC: 103 MMOL/L (ref 99–110)
CO2: 29 MMOL/L (ref 21–32)
CREAT SERPL-MCNC: 0.7 MG/DL (ref 0.9–1.3)
EOSINOPHILS ABSOLUTE: 0.1 K/UL (ref 0–0.6)
EOSINOPHILS RELATIVE PERCENT: 2 %
GFR AFRICAN AMERICAN: >60
GFR NON-AFRICAN AMERICAN: >60
GLUCOSE BLD-MCNC: 101 MG/DL (ref 70–99)
HCT VFR BLD CALC: 24.8 % (ref 40.5–52.5)
HEMOGLOBIN: 8.3 G/DL (ref 13.5–17.5)
LYMPHOCYTES ABSOLUTE: 3.4 K/UL (ref 1–5.1)
LYMPHOCYTES RELATIVE PERCENT: 47.3 %
MCH RBC QN AUTO: 32.3 PG (ref 26–34)
MCHC RBC AUTO-ENTMCNC: 33.5 G/DL (ref 31–36)
MCV RBC AUTO: 96.4 FL (ref 80–100)
MONOCYTES ABSOLUTE: 0.9 K/UL (ref 0–1.3)
MONOCYTES RELATIVE PERCENT: 12.8 %
NEUTROPHILS ABSOLUTE: 2.6 K/UL (ref 1.7–7.7)
NEUTROPHILS RELATIVE PERCENT: 36 %
PDW BLD-RTO: 18.7 % (ref 12.4–15.4)
PLATELET # BLD: 834 K/UL (ref 135–450)
PMV BLD AUTO: 8.5 FL (ref 5–10.5)
POTASSIUM REFLEX MAGNESIUM: 3.8 MMOL/L (ref 3.5–5.1)
RBC # BLD: 2.57 M/UL (ref 4.2–5.9)
SODIUM BLD-SCNC: 142 MMOL/L (ref 136–145)
WBC # BLD: 7.2 K/UL (ref 4–11)

## 2020-08-26 PROCEDURE — 6370000000 HC RX 637 (ALT 250 FOR IP): Performed by: NURSE PRACTITIONER

## 2020-08-26 PROCEDURE — 94761 N-INVAS EAR/PLS OXIMETRY MLT: CPT

## 2020-08-26 PROCEDURE — 2580000003 HC RX 258: Performed by: FAMILY MEDICINE

## 2020-08-26 PROCEDURE — 2580000003 HC RX 258: Performed by: NURSE PRACTITIONER

## 2020-08-26 PROCEDURE — 85025 COMPLETE CBC W/AUTO DIFF WBC: CPT

## 2020-08-26 PROCEDURE — 2700000000 HC OXYGEN THERAPY PER DAY

## 2020-08-26 PROCEDURE — 94770 HC ETCO2 MONITOR DAILY: CPT

## 2020-08-26 PROCEDURE — 80048 BASIC METABOLIC PNL TOTAL CA: CPT

## 2020-08-26 PROCEDURE — 6370000000 HC RX 637 (ALT 250 FOR IP): Performed by: INTERNAL MEDICINE

## 2020-08-26 RX ORDER — FOLIC ACID 1 MG/1
2 TABLET ORAL DAILY
Qty: 30 TABLET | Refills: 0 | Status: SHIPPED | OUTPATIENT
Start: 2020-08-26 | End: 2021-02-08

## 2020-08-26 RX ORDER — HYDROXYUREA 500 MG/1
2000 CAPSULE ORAL DAILY
Qty: 120 CAPSULE | Refills: 0 | Status: ON HOLD | OUTPATIENT
Start: 2020-08-26 | End: 2020-12-09 | Stop reason: HOSPADM

## 2020-08-26 RX ADMIN — HYDROXYUREA 1500 MG: 500 CAPSULE ORAL at 10:09

## 2020-08-26 RX ADMIN — OXYCODONE HYDROCHLORIDE 15 MG: 15 TABLET ORAL at 12:15

## 2020-08-26 RX ADMIN — NAPROXEN 500 MG: 250 TABLET ORAL at 10:14

## 2020-08-26 RX ADMIN — Medication 10 ML: at 10:06

## 2020-08-26 RX ADMIN — SODIUM CHLORIDE: 9 INJECTION, SOLUTION INTRAVENOUS at 00:08

## 2020-08-26 RX ADMIN — FOLIC ACID 2 MG: 1 TABLET ORAL at 10:08

## 2020-08-26 RX ADMIN — BACITRACIN ZINC, NEOMYCIN SULFATE, AND POLYMYXIN B SULFATE: 400; 3.5; 5 OINTMENT TOPICAL at 10:06

## 2020-08-26 RX ADMIN — METHADONE HYDROCHLORIDE 15 MG: 5 TABLET ORAL at 10:08

## 2020-08-26 RX ADMIN — SODIUM CHLORIDE: 9 INJECTION, SOLUTION INTRAVENOUS at 10:03

## 2020-08-26 RX ADMIN — CYANOCOBALAMIN TAB 500 MCG 1000 MCG: 500 TAB at 10:08

## 2020-08-26 RX ADMIN — OXYCODONE HYDROCHLORIDE 15 MG: 15 TABLET ORAL at 01:31

## 2020-08-26 ASSESSMENT — PAIN SCALES - GENERAL
PAINLEVEL_OUTOF10: 6
PAINLEVEL_OUTOF10: 6
PAINLEVEL_OUTOF10: 7
PAINLEVEL_OUTOF10: 7

## 2020-08-26 NOTE — PLAN OF CARE
Pt remains free from falls. Calls out appropriately. Independent. Low fall risk. Will continue to monitor.

## 2020-08-26 NOTE — DISCHARGE SUMMARY
Hospital Discharge Summary     NAME: Brenna Mittal    :  2000    MRN:  [id number]    Admission Details:     Admit date:  2020    Discharge condition: stable/good   Admitting Physician:  No admitting provider for patient encounter. Primary Care Physician:  Christa Mclaughlin MD    Discharge Details:     Discharge date:  20   Discharge Diagnoses:    Patient Active Problem List   Diagnosis    Sickle cell pain crisis [facility] [address]    Asthma    Sickle cell crisis [facility] [address]    Sepsis ([facility] [address]     CONSTITUTIONAL:  awake, alert, cooperative, no apparent distress, HEENT oral pharynx , no scleral icterus. Likely small skin infection under the right orbit- nearly resolved with slight pink area noted   HEMATOLOGIC/LYMPHATICS:  no cervical lymphadenopathy, no supraclavicular lymphadenopathy, no axillary lymphadenopathy and no inguinal lymphadenopathy  LUNGS:  No increased work of breathing, good air exchange, clear to auscultation bilaterally, no crackles or wheezing  CARDIOVASCULAR:  , regular rate and rhythm, normal S1 and S2, no S3 or S4, and no murmur noted  ABDOMEN:  No scars, normal bowel sounds, soft, non-distended, non-tender, no masses palpated, no hepatosplenomegally  MUSCULOSKELETAL:  There is no redness, warmth, or swelling of the joints. EXTREMETIES: No clubbing cynosis or edema  NEUROLOGIC:  L hand  strength is 4/5 with 4/5 MS with abduction and adduction. Pupils equal and reactive to light bilaterally, smile is symmetrical, no tongue deviation   SKIN:  no bruising or bleeding  Hospital Course:       Sickle cell crisis:  -No evidence of acute chest pain syndrome- no stroke.    -This is minimally better  -We will try blood products, IV fluids, Hydrea and analgesia  -There is no definite precipitating cause  - s/p red cell exchange on    - Pull apheresis line    - Resume home dose Hydrea 2000 mg daily   - Start B12 replacement for borderline low counts - script given   - Hgb stable now at 8.3      Pain:    -His pain is better today, stop PCA for dc   -Increased his methadone to 15 mg p.o. 3 times daily on 8/21/2020. This drug will reach steady state in 3-5 days due to its long half life.  DC on home dose now given resolution of crisis and retic now back to normal.   - Oxy IR for BT- will need script from pain mgmt   - Naprosyn 500 BID for inflammatory bone pain- creat stable - stop on dc   - Resume home Methadone dose now that crisis over         Access  - PICC line  - likely needs outpatient PORT- defer to Dr. Edwar Valentin          Infectious disease:  -COVID testing negative      L arm weakness  - CTA head negative for stroke  - MRI no signs of AVN or abscess or drainage effusion   - sling ordered for immobilization   - improving - no induration or swelling noted on clinical exam   - resolved now      Fever  - pan cx, chest xray -Both are normal  - procal Mildly elevated 0.51 lactic acid is normal  - cont Rocephin and Zithromax - blood cx NGTD- stopped 8-17   -His fever has subsided and it may be due to his crisis     Hypomagnesemia  -replaced      Thrombocytosis  - likely reactive   - check SED RATE and CRP - elevated - due to crisis and now improving     Discharge Medications:       [facility] Medication Instructions GGB:[id number]    Printed on:08/26/20 1117   Medication Information                      folic acid (FOLVITE) 1 MG tablet  Take 2 tablets by mouth daily             hydroxyurea (HYDREA) 500 MG chemo capsule  Take 4 capsules by mouth daily Unsure of dose             methadone (DOLOPHINE) 5 MG tablet  Take 5 mg by mouth every 4 hours as needed for Pain.             vitamin B-12 1000 MCG tablet  Take 1 tablet by mouth daily                 Consults:  Apheresis     Significant Diagnostic Studies/Imaging:     Cta Head W Wo Contrast    Result Date: 8/17/2020  EXAMINATION: CTA OF THE HEAD WITHOUT AND WITH CONTRAST 8/17/2020 2:55 pm TECHNIQUE: CTA of the head/brain was performed without and with the administration of intravenous contrast. Multiplanar reformatted images are provided for review. MIP images are provided for review. Dose modulation, iterative reconstruction, and/or weight based adjustment of the mA/kV was utilized to reduce the radiation dose to as low as reasonably achievable. COMPARISON: None HISTORY: ORDERING SYSTEM PROVIDED HISTORY: eval for TIA/stroke TECHNOLOGIST PROVIDED HISTORY: Reason for exam:->eval for TIA/stroke Reason for Exam: confusion, pt in sickle cell pain crisis Acuity: Acute Type of Exam: Initial FINDINGS: CT HEAD: BRAIN/VENTRICLES:  No acute intracranial hemorrhage or extraaxial fluid collection. Grey-white differentiation is maintained. No evidence of mass, mass effect or midline shift. No evidence of hydrocephalus. ORBITS: The visualized portion of the orbits demonstrate no acute abnormality. SINUSES:  The visualized paranasal sinuses and mastoid air cells demonstrate no acute abnormality. SOFT TISSUES/SKULL: No acute abnormality of the visualized skull or soft tissues. CTA HEAD: ANTERIOR CIRCULATION: No significant stenosis of the intracranial internal carotid, anterior cerebral, or middle cerebral arteries. No aneurysm. There is a right-sided posterior communicating artery. POSTERIOR CIRCULATION: No significant stenosis of the vertebral, basilar, or posterior cerebral arteries. No aneurysm. OTHER: No dural venous sinus thrombosis on this non-dedicated study. 1. No CT evidence of an acute infarct. 2. Unremarkable CTA of the head. 3.  The findings were sent to the Radiology Results [address] at 5:53 pm on 8/17/2020to be communicated to a licensed caregiver.      Xr Chest (2 Vw)    Result Date: 8/14/2020  EXAMINATION: TWO XRAY VIEWS OF THE CHEST 8/14/2020 10:08 am COMPARISON: August 6, 2020 HISTORY: ORDERING SYSTEM PROVIDED HISTORY: rule out pna TECHNOLOGIST PROVIDED HISTORY: Reason for exam:->rule out pna FINDINGS: No evidence of pneumonia, edema or other acute pulmonary process. No evidence of acute process of the cardiac or mediastinal structures. No evidence of pneumothorax or pleural effusion. No evidence of acute cardiopulmonary disease. Xr Humerus Left (min 2 Views)    Result Date: 8/13/2020  EXAMINATION: TWO XRAY VIEWS OF THE LEFT HUMERUS; THREE XRAY VIEWS OF THE LEFT ELBOW 8/13/2020 11:40 am COMPARISON: None. HISTORY: ORDERING SYSTEM PROVIDED HISTORY: pain, eval fx , effusion, sickle cell TECHNOLOGIST PROVIDED HISTORY: Reason for exam:->pain, eval fx , effusion, sickle cell FINDINGS: Humerus: Alignment appears normal.  No acute fracture. No obvious bone infarct Elbow: Alignment appears normal.  No acute fracture, malalignment, or significant degenerative change     No acute osseous abnormality left humerus No acute osseous abnormality left elbow     Xr Elbow Left (min 3 Views)    Result Date: 8/13/2020  EXAMINATION: TWO XRAY VIEWS OF THE LEFT HUMERUS; THREE XRAY VIEWS OF THE LEFT ELBOW 8/13/2020 11:40 am COMPARISON: None. HISTORY: ORDERING SYSTEM PROVIDED HISTORY: pain, eval fx , effusion, sickle cell TECHNOLOGIST PROVIDED HISTORY: Reason for exam:->pain, eval fx , effusion, sickle cell FINDINGS: Humerus: Alignment appears normal.  No acute fracture. No obvious bone infarct Elbow: Alignment appears normal.  No acute fracture, malalignment, or significant degenerative change     No acute osseous abnormality left humerus No acute osseous abnormality left elbow     Xr Knee Left (1-2 Views)    Result Date: 8/10/2020  EXAMINATION: TWO XRAY VIEWS OF THE LEFT KNEE; TWO XRAY VIEWS OF THE RIGHT KNEE 8/9/2020 1:34 pm COMPARISON: None.  HISTORY: ORDERING SYSTEM PROVIDED HISTORY: left knee pain TECHNOLOGIST PROVIDED HISTORY: Reason for exam:->left knee pain; ORDERING SYSTEM PROVIDED HISTORY: right knee pain TECHNOLOGIST PROVIDED HISTORY: Reason for exam:->right knee pain FINDINGS: Frontal and lateral views of the right knee were performed. There is no acute fracture or dislocation. A joint effusion is not identified. The joint spaces are preserved. No destructive osseous lesion is seen. No soft tissue abnormality is evident. Frontal and lateral views of the left knee were performed. There is no acute fracture or dislocation. A joint effusion is not identified. The joint spaces are preserved. No destructive osseous lesion is seen. No soft tissue abnormality is evident. No acute abnormality of either knee. No significant arthritic change. Xr Knee Right (1-2 Views)    Result Date: 8/10/2020  EXAMINATION: TWO XRAY VIEWS OF THE LEFT KNEE; TWO XRAY VIEWS OF THE RIGHT KNEE 8/9/2020 1:34 pm COMPARISON: None. HISTORY: ORDERING SYSTEM PROVIDED HISTORY: left knee pain TECHNOLOGIST PROVIDED HISTORY: Reason for exam:->left knee pain; ORDERING SYSTEM PROVIDED HISTORY: right knee pain TECHNOLOGIST PROVIDED HISTORY: Reason for exam:->right knee pain FINDINGS: Frontal and lateral views of the right knee were performed. There is no acute fracture or dislocation. A joint effusion is not identified. The joint spaces are preserved. No destructive osseous lesion is seen. No soft tissue abnormality is evident. Frontal and lateral views of the left knee were performed. There is no acute fracture or dislocation. A joint effusion is not identified. The joint spaces are preserved. No destructive osseous lesion is seen. No soft tissue abnormality is evident. No acute abnormality of either knee. No significant arthritic change. Xr Knee Left (3 Views)    Result Date: 8/13/2020  EXAMINATION: THREE XRAY VIEWS OF THE LEFT KNEE 8/13/2020 11:40 am COMPARISON: August 9, 2020 HISTORY: ORDERING SYSTEM PROVIDED HISTORY: eval for effusion, fracture, pain sickle cell TECHNOLOGIST PROVIDED HISTORY: Reason for exam:->eval for effusion, fracture, pain sickle cell FINDINGS: No acute fracture or dislocation is present involving the left knee.  Joint Reason for exam:->eval for hepatosplenomegaly, sickle cell crisis, rule out organ infarction Additional Contrast?->Oral Reason for Exam: eval for hepatosplenomegaly, sickle cell crisis, rule out organ infarction Acuity: Acute Type of Exam: Initial Relevant Medical/Surgical History: splenectomy FINDINGS: Lower Chest: There are curvilinear opacities within both lower lobes, likely representing mild atelectasis. The lung bases are otherwise clear. Organs: The spleen is surgically absent. There is mild hepatomegaly. The liver is otherwise unremarkable, without evidence of infarction. The pancreas is unremarkable. The gallbladder is normal.  The adrenal glands are normal bilaterally. The bilateral kidneys are unremarkable, without evidence of inflammatory change or infarction. GI/Bowel: Evaluation of the hollow GI tract demonstrates no evidence of abnormal bowel wall thickening, dilatation, or obstruction. While the appendix is not definitely visualized, there are no pericecal inflammatory changes to suggest appendicitis. There is a large amount of stool throughout the colon, suggestive of constipation. Pelvis: The urinary bladder is mildly distended, though otherwise normal. The prostate is unremarkable. There is no free pelvic fluid or pathologic pelvic lymphadenopathy. Peritoneum/Retroperitoneum: No intraperitoneal free air or free fluid is identified. No pathologic lymphadenopathy is seen. The abdominal aorta is unremarkable. No significant abdominal wall hernia is identified. Bones/Soft Tissues: No acute findings. 1. No acute process within the abdomen or pelvis. 2. No CT evidence of organ infarction. 3. Mild hepatomegaly. 4. Patient status post splenectomy. 5. Large amount of stool throughout the colon, suggestive of constipation. Xr Chest Portable    Result Date: 8/6/2020  EXAMINATION: ONE XRAY VIEW OF THE CHEST 8/6/2020 7:03 pm COMPARISON: 07/25/2020.  HISTORY: ORDERING SYSTEM PROVIDED HISTORY: chest pain, sickle cell, fever TECHNOLOGIST PROVIDED HISTORY: Reason for exam:->chest pain, sickle cell, fever Reason for Exam: chest pain, sickle cell, fever Acuity: Acute Type of Exam: Initial FINDINGS: The cardiac silhouette appears at the upper limits of normal for size. No focal consolidation is seen. There is no pleural effusion or pneumothorax. No convincing acute cardiopulmonary abnormality. Ct Chest Pulmonary Embolism W Contrast    Result Date: 8/6/2020  EXAMINATION: CTA OF THE CHEST 8/6/2020 8:45 pm TECHNIQUE: CTA of the chest was performed after the administration of intravenous contrast.  Multiplanar reformatted images are provided for review. MIP images are provided for review. Dose modulation, iterative reconstruction, and/or weight based adjustment of the mA/kV was utilized to reduce the radiation dose to as low as reasonably achievable. COMPARISON: None. HISTORY: ORDERING SYSTEM PROVIDED HISTORY: Chest pain, hypoxia. Sickle cell patient with fever and recent pneumonia TECHNOLOGIST PROVIDED HISTORY: Reason for exam:->Chest pain, hypoxia. Sickle cell patient with fever and recent pneumonia Reason for Exam: Chest pain, hypoxia. Sickle cell patient with fever and recent pneumonia Acuity: Acute Type of Exam: Initial FINDINGS: Pulmonary Arteries: Pulmonary arteries are adequately opacified for evaluation. No evidence of intraluminal filling defect to suggest pulmonary embolism. Main pulmonary artery is normal in caliber. Mediastinum: No evidence of mediastinal lymphadenopathy. The heart and pericardium demonstrate no acute abnormality. There is no acute abnormality of the thoracic aorta. Lungs/pleura: The lungs are without acute process. No focal consolidation or pulmonary edema. No evidence of pleural effusion or pneumothorax. Upper Abdomen: Spleen is not visualized. Upper abdomen otherwise is unremarkable. Soft Tissues/Bones: No acute bone or soft tissue abnormality.      No evidence of pulmonary embolism or acute pulmonary abnormality. Ir Picc Wo Sq Port/pump > 5 Years    Result Date: 8/14/2020  EXAMINATION: LIMITED ULTRASOUND OF THE ARM FOR PICC ACCESS, 8/14/2020 TECHNIQUE: The PICC team used ultrasound and VPS guidance to place a PICC line. HISTORY: ORDERING SYSTEM PROVIDED HISTORY: limited access TECHNOLOGIST PROVIDED HISTORY: Reason for exam:->limited access How many lumens are being requested?->2 What site is the preferred site? ->No preference What side should this line be placed? ->Either FLUOROSCOPY DOSE AND TYPE OR TIME AND EXPOSURES: Fluoroscopy was not used FINDINGS: Ultrasound images demonstrate patency of the left brachial vein which was used for access for placement of a PICC by the PICC team, without a radiologist present. VPS guidance was used by the PICC team By report, a 40 cm left brachial vein dual lumen PICC line was placed. Successful placement of PICC line. Vl Extremity Venous Bilateral    Result Date: 8/18/2020  Upper Extremities Veins  Demographics   Patient Name       Tracy Best   Date of Study      08/17/2020        Gender              Male   Patient Number     [id number]        Date of Birth       2000   Visit Number       [id number]         Age                 23 year(s)   Accession Number   [id number]        Room Number         08   Corporate ID       [id number]          Sonographer         Bessie Vidal RDMS, T   Ordering Physician Shea Nelson  Interpreting        Marcy Earing                                       Physician           Vascular                                                           Codi Burns MD, [facility] [place], [facility]  Procedure Type of Study:   Veins:Upper Extremities Veins, VASC EXTREMITY VENOUS DUPLEX BILATERAL. Vascular Sonographer Report  Indications for Study:Arm pain.  Additional Indications:Patient complains of left arm pain for one week, PICC line was placed two days ago. Vein Mapping: B-mode imaging of the superficial veins of the upper extremities, including compression maneuvers and dopplers. Impressions Right Impression There is no evidence of deep or superficial venous thrombosis involving the right upper extremity. Left Impression There is no evidence of deep or superficial venous thrombosis involving the left upper extremity. Limited exam due to bandages and PICC line. There is no previous exam for comparison. Conclusions   Summary   No evidence of deep vein or superficial venous thrombosis of the bilateral  upper extremities. Signature   ------------------------------------------------------------------  Electronically signed by Lizabeth Louise MD, Raheem Ayoub  (Interpreting physician) on 08/18/2020 at 09:38 AM  ------------------------------------------------------------------  Patient Status:STAT. Study [facility] - Vascular Lab. Technical Quality:Limited visualization due to dressings. Velocities are measured in cm/s ; Diameters are measured in mm Right UE Vein Measurements 2D Measurements +--------+----------+---------------+----------+ ! Location! Visualized! Compressibility! Thrombosis! +--------+----------+---------------+----------+ ! IJV     ! Yes       ! Yes            ! None      ! +--------+----------+---------------+----------+ ! SCV     ! Yes       ! Yes            ! None      ! +--------+----------+---------------+----------+ ! Axillary! Yes       ! Yes            ! None      ! +--------+----------+---------------+----------+ ! Brachial!Yes       ! Yes            ! None      ! +--------+----------+---------------+----------+ ! Radial  !Yes       ! Yes            ! None      ! +--------+----------+---------------+----------+ ! Ulnar   ! Yes       ! Yes            ! None      ! +--------+----------+---------------+----------+ ! Basilic ! Yes       ! Yes            ! None 8/18/2020  PROCEDURE: ULTRASOUND GUIDED VASCULAR ACCESS. FLUOROSCOPY GUIDED PLACEMENT OF A NON-TUNNELED CATHETER. MODERATE CONSCIOUS SEDATION 8/18/2020. HISTORY: ORDERING SYSTEM PROVIDED HISTORY: red cell exchange, temp dialysis line TECHNOLOGIST PROVIDED HISTORY: Reason for exam:->red cell exchange, temp dialysis line How many lumens are being requested?->2 What site is the preferred site? ->No preference What side should this line be placed? ->Either SEDATION: 25 mcg of fentanyl and 0.5 mg of Versed. Sedation time was 13 minutes FLUOROSCOPY DOSE AND TYPE OR TIME AND EXPOSURES: 53 seconds. TECHNIQUE: Informed consent was obtained after a detailed explanation of the procedure including risks, benefits, and alternatives. Universal protocol was observed. The right neck and chest were prepped and draped in sterile fashion using maximum sterile barrier technique. Local anesthesia was achieved with lidocaine. A micropuncture needle was used to access the right internal jugular vein using ultrasound guidance. An ultrasound image demonstrating patency of the vein with needle tip located within it. An image was obtained and stored in PACs. A 0.035 guidewire was used to place a 16 cm 12.5 [place] temporary dialysis catheter using fluoroscopic guidance with tip in the right atrium after fascial tract dilation. The catheter flushed easily and there was a good blood return. The catheter was sutured to the skin. The catheter was locked with heparinized saline. The patient tolerated the procedure well and there were no immediate complications. FINDINGS: Fluoroscopic image demonstrates the tip of the catheter in the right atrium. Successful ultrasound and fluoroscopy guided non-tunneled right IJ temporary dialysis catheter placement. Treatments:     Red cell exchange on 8-18-20     Disposition:     [facility] should be follow up with Riley Lozano MD in one week.        Signed:     8/26/2020  11:17 AM    Mariela Jade CNP   Medical Oncology/Hematology    [address],  [place] [address]  Phone: [phone]  Fax: [phone]      The patient was seen and examined. I agree with the assessment above. He had quite a significant sickle cell crisis. I discussed his care with his primary oncologist.  He will likely need to start on 1 of our anti-sickling agents as an outpatient, because I am concerned these crisis will escalate. I agree with the assessment and discharge summary per Mariela Jade, listed above.       Bob Dorado MD  May be reached through [address] [place] [address]

## 2020-08-26 NOTE — PROGRESS NOTES
Wasted 20 mL Dilaudid from discontinued PCA pump with Paul Kc RN. Witnessed waste.  Ganesh Pritchard

## 2020-08-26 NOTE — PROGRESS NOTES
Pt is alert and oriented x 4. Vitals signs are stable. Assessment is as charted. Pt continues to report pain r/t sickle cell crisis. Pt denies further needs at this time. Will continue to monitor.

## 2020-08-26 NOTE — CARE COORDINATION
Chart reviewed day 20. Patient transfused yesterday. Hopeful to d/c today pending pain and labs. HGB 8.3 today. Patient Nitesh Mccann will follow up with hem/onc, no DCP needs noted.  Rima Abreu RN

## 2020-09-02 ENCOUNTER — TELEPHONE (OUTPATIENT)
Dept: SURGERY | Age: 20
End: 2020-09-02

## 2020-09-02 NOTE — TELEPHONE ENCOUNTER
Radha Maguire would like for Tatiana to call her back regarding this patient. She wants to know if Tatiana received her fax about the patient needing a port placed.

## 2020-09-02 NOTE — TELEPHONE ENCOUNTER
Port order received, called the patient, states he would like to see Dr. Mg Hobbs before scheduling surgery.

## 2020-09-02 NOTE — TELEPHONE ENCOUNTER
Left message stating that I've only received insurance information, if an order is faxed I would be happy to get the patient scheduled.

## 2020-09-26 ENCOUNTER — APPOINTMENT (OUTPATIENT)
Dept: GENERAL RADIOLOGY | Age: 20
End: 2020-09-26
Payer: COMMERCIAL

## 2020-09-26 ENCOUNTER — HOSPITAL ENCOUNTER (OUTPATIENT)
Age: 20
Setting detail: OBSERVATION
Discharge: HOME | End: 2020-09-28
Attending: EMERGENCY MEDICINE | Admitting: HOSPITALIST
Payer: COMMERCIAL

## 2020-09-26 PROBLEM — T40.601A OPIATE OVERDOSE (HCC): Status: ACTIVE | Noted: 2020-09-26

## 2020-09-26 PROBLEM — T50.7X1A: Status: ACTIVE | Noted: 2020-09-26

## 2020-09-26 LAB
A/G RATIO: 1.3 (ref 1.1–2.2)
ABO/RH: NORMAL
ALBUMIN SERPL-MCNC: 4 G/DL (ref 3.4–5)
ALP BLD-CCNC: 88 U/L (ref 40–129)
ALT SERPL-CCNC: 73 U/L (ref 10–40)
AMPHETAMINE SCREEN, URINE: ABNORMAL
ANION GAP SERPL CALCULATED.3IONS-SCNC: 8 MMOL/L (ref 3–16)
ANISOCYTOSIS: ABNORMAL
ANTIBODY SCREEN: NORMAL
AST SERPL-CCNC: 83 U/L (ref 15–37)
BACTERIA: ABNORMAL /HPF
BANDED NEUTROPHILS RELATIVE PERCENT: 2 % (ref 0–7)
BARBITURATE SCREEN URINE: ABNORMAL
BASE EXCESS ARTERIAL: 1.8 MMOL/L (ref -3–3)
BASOPHILS ABSOLUTE: 0 K/UL (ref 0–0.2)
BASOPHILS RELATIVE PERCENT: 0 %
BENZODIAZEPINE SCREEN, URINE: ABNORMAL
BILIRUB SERPL-MCNC: 5.6 MG/DL (ref 0–1)
BILIRUBIN URINE: ABNORMAL
BLOOD, URINE: ABNORMAL
BUN BLDV-MCNC: 7 MG/DL (ref 7–20)
CALCIUM SERPL-MCNC: 9 MG/DL (ref 8.3–10.6)
CANNABINOID SCREEN URINE: ABNORMAL
CARBOXYHEMOGLOBIN ARTERIAL: 1.9 % (ref 0–1.5)
CHLORIDE BLD-SCNC: 101 MMOL/L (ref 99–110)
CLARITY: CLEAR
CO2: 28 MMOL/L (ref 21–32)
COCAINE METABOLITE SCREEN URINE: ABNORMAL
COLOR: YELLOW
CREAT SERPL-MCNC: 0.6 MG/DL (ref 0.9–1.3)
EOSINOPHILS ABSOLUTE: 0.3 K/UL (ref 0–0.6)
EOSINOPHILS RELATIVE PERCENT: 2 %
GFR AFRICAN AMERICAN: >60
GFR NON-AFRICAN AMERICAN: >60
GLOBULIN: 3.1 G/DL
GLUCOSE BLD-MCNC: 104 MG/DL (ref 70–99)
GLUCOSE BLD-MCNC: 107 MG/DL (ref 70–99)
GLUCOSE URINE: NEGATIVE MG/DL
HCO3 ARTERIAL: 26.9 MMOL/L (ref 21–29)
HCT VFR BLD CALC: 20.7 % (ref 40.5–52.5)
HEMOGLOBIN, ART, EXTENDED: 8.2 G/DL (ref 13.5–17.5)
HEMOGLOBIN: 7.4 G/DL (ref 13.5–17.5)
IMMATURE RETIC FRACT: 0.68 (ref 0.21–0.37)
KETONES, URINE: NEGATIVE MG/DL
LACTIC ACID: 0.5 MMOL/L (ref 0.4–2)
LEUKOCYTE ESTERASE, URINE: NEGATIVE
LYMPHOCYTES ABSOLUTE: 3.4 K/UL (ref 1–5.1)
LYMPHOCYTES RELATIVE PERCENT: 21 %
Lab: ABNORMAL
MACROCYTES: ABNORMAL
MCH RBC QN AUTO: 36.1 PG (ref 26–34)
MCHC RBC AUTO-ENTMCNC: 35.6 G/DL (ref 31–36)
MCV RBC AUTO: 101.3 FL (ref 80–100)
METHADONE SCREEN, URINE: POSITIVE
METHEMOGLOBIN ARTERIAL: 1.2 %
MICROSCOPIC EXAMINATION: YES
MONOCYTES ABSOLUTE: 1.1 K/UL (ref 0–1.3)
MONOCYTES RELATIVE PERCENT: 7 %
NEUTROPHILS ABSOLUTE: 11.3 K/UL (ref 1.7–7.7)
NEUTROPHILS RELATIVE PERCENT: 68 %
NITRITE, URINE: NEGATIVE
O2 CONTENT ARTERIAL: 10 ML/DL
O2 SAT, ARTERIAL: 88.5 %
O2 THERAPY: ABNORMAL
OPIATE SCREEN URINE: ABNORMAL
OXYCODONE URINE: ABNORMAL
PCO2 ARTERIAL: 44.8 MMHG (ref 35–45)
PDW BLD-RTO: 24.3 % (ref 12.4–15.4)
PERFORMED ON: ABNORMAL
PH ARTERIAL: 7.4 (ref 7.35–7.45)
PH UA: 6
PH UA: 6 (ref 5–8)
PHENCYCLIDINE SCREEN URINE: ABNORMAL
PLATELET # BLD: 281 K/UL (ref 135–450)
PLATELET SLIDE REVIEW: ADEQUATE
PMV BLD AUTO: 9 FL (ref 5–10.5)
PO2 ARTERIAL: 69.8 MMHG (ref 75–108)
POIKILOCYTES: ABNORMAL
POLYCHROMASIA: ABNORMAL
POTASSIUM SERPL-SCNC: 3.7 MMOL/L (ref 3.5–5.1)
PROCALCITONIN: 0.16 NG/ML (ref 0–0.15)
PROPOXYPHENE SCREEN: ABNORMAL
PROTEIN UA: NEGATIVE MG/DL
RBC # BLD: 2.04 M/UL (ref 4.2–5.9)
RBC UA: ABNORMAL /HPF (ref 0–4)
RETICULOCYTE ABSOLUTE COUNT: 0.34 M/UL
RETICULOCYTE COUNT PCT: 16.35 % (ref 0.5–2.18)
SICKLE CELLS: ABNORMAL
SLIDE REVIEW: ABNORMAL
SODIUM BLD-SCNC: 137 MMOL/L (ref 136–145)
SPECIFIC GRAVITY UA: 1.01 (ref 1–1.03)
SPECIMEN STATUS: NORMAL
TCO2 ARTERIAL: 28.3 MMOL/L
TOTAL PROTEIN: 7.1 G/DL (ref 6.4–8.2)
TROPONIN: <0.01 NG/ML
URINE TYPE: ABNORMAL
UROBILINOGEN, URINE: 2 E.U./DL
WBC # BLD: 16.1 K/UL (ref 4–11)
WBC UA: ABNORMAL /HPF (ref 0–5)

## 2020-09-26 PROCEDURE — 86850 RBC ANTIBODY SCREEN: CPT

## 2020-09-26 PROCEDURE — 86900 BLOOD TYPING SEROLOGIC ABO: CPT

## 2020-09-26 PROCEDURE — G0378 HOSPITAL OBSERVATION PER HR: HCPCS

## 2020-09-26 PROCEDURE — 80053 COMPREHEN METABOLIC PANEL: CPT

## 2020-09-26 PROCEDURE — 71045 X-RAY EXAM CHEST 1 VIEW: CPT

## 2020-09-26 PROCEDURE — 84484 ASSAY OF TROPONIN QUANT: CPT

## 2020-09-26 PROCEDURE — 36600 WITHDRAWAL OF ARTERIAL BLOOD: CPT

## 2020-09-26 PROCEDURE — 94761 N-INVAS EAR/PLS OXIMETRY MLT: CPT

## 2020-09-26 PROCEDURE — 84145 PROCALCITONIN (PCT): CPT

## 2020-09-26 PROCEDURE — 85025 COMPLETE CBC W/AUTO DIFF WBC: CPT

## 2020-09-26 PROCEDURE — 6360000002 HC RX W HCPCS: Performed by: NURSE PRACTITIONER

## 2020-09-26 PROCEDURE — 93005 ELECTROCARDIOGRAM TRACING: CPT | Performed by: EMERGENCY MEDICINE

## 2020-09-26 PROCEDURE — 85045 AUTOMATED RETICULOCYTE COUNT: CPT

## 2020-09-26 PROCEDURE — 96375 TX/PRO/DX INJ NEW DRUG ADDON: CPT

## 2020-09-26 PROCEDURE — 86901 BLOOD TYPING SEROLOGIC RH(D): CPT

## 2020-09-26 PROCEDURE — 80307 DRUG TEST PRSMV CHEM ANLYZR: CPT

## 2020-09-26 PROCEDURE — 82803 BLOOD GASES ANY COMBINATION: CPT

## 2020-09-26 PROCEDURE — 83605 ASSAY OF LACTIC ACID: CPT

## 2020-09-26 PROCEDURE — 2580000003 HC RX 258: Performed by: NURSE PRACTITIONER

## 2020-09-26 PROCEDURE — 85660 RBC SICKLE CELL TEST: CPT

## 2020-09-26 PROCEDURE — 81001 URINALYSIS AUTO W/SCOPE: CPT

## 2020-09-26 PROCEDURE — 99285 EMERGENCY DEPT VISIT HI MDM: CPT

## 2020-09-26 PROCEDURE — 96374 THER/PROPH/DIAG INJ IV PUSH: CPT

## 2020-09-26 RX ORDER — 0.9 % SODIUM CHLORIDE 0.9 %
1000 INTRAVENOUS SOLUTION INTRAVENOUS ONCE
Status: COMPLETED | OUTPATIENT
Start: 2020-09-26 | End: 2020-09-26

## 2020-09-26 RX ORDER — SODIUM CHLORIDE 9 MG/ML
INJECTION, SOLUTION INTRAVENOUS CONTINUOUS
Status: DISCONTINUED | OUTPATIENT
Start: 2020-09-26 | End: 2020-09-28 | Stop reason: HOSPADM

## 2020-09-26 RX ORDER — ACETAMINOPHEN 650 MG/1
650 SUPPOSITORY RECTAL EVERY 6 HOURS PRN
Status: DISCONTINUED | OUTPATIENT
Start: 2020-09-26 | End: 2020-09-28 | Stop reason: HOSPADM

## 2020-09-26 RX ORDER — NALOXONE HYDROCHLORIDE 1 MG/ML
0.4 INJECTION INTRAMUSCULAR; INTRAVENOUS; SUBCUTANEOUS ONCE
Status: COMPLETED | OUTPATIENT
Start: 2020-09-26 | End: 2020-09-26

## 2020-09-26 RX ORDER — PROMETHAZINE HYDROCHLORIDE 25 MG/1
12.5 TABLET ORAL EVERY 6 HOURS PRN
Status: DISCONTINUED | OUTPATIENT
Start: 2020-09-26 | End: 2020-09-28 | Stop reason: HOSPADM

## 2020-09-26 RX ORDER — SODIUM CHLORIDE 0.9 % (FLUSH) 0.9 %
10 SYRINGE (ML) INJECTION EVERY 12 HOURS SCHEDULED
Status: DISCONTINUED | OUTPATIENT
Start: 2020-09-26 | End: 2020-09-28 | Stop reason: HOSPADM

## 2020-09-26 RX ORDER — SODIUM CHLORIDE 0.9 % (FLUSH) 0.9 %
10 SYRINGE (ML) INJECTION PRN
Status: DISCONTINUED | OUTPATIENT
Start: 2020-09-26 | End: 2020-09-28 | Stop reason: HOSPADM

## 2020-09-26 RX ORDER — ONDANSETRON 2 MG/ML
4 INJECTION INTRAMUSCULAR; INTRAVENOUS EVERY 6 HOURS PRN
Status: DISCONTINUED | OUTPATIENT
Start: 2020-09-26 | End: 2020-09-28 | Stop reason: HOSPADM

## 2020-09-26 RX ORDER — ACETAMINOPHEN 325 MG/1
650 TABLET ORAL EVERY 6 HOURS PRN
Status: DISCONTINUED | OUTPATIENT
Start: 2020-09-26 | End: 2020-09-28 | Stop reason: HOSPADM

## 2020-09-26 RX ADMIN — SODIUM CHLORIDE 1000 ML: 9 INJECTION, SOLUTION INTRAVENOUS at 17:38

## 2020-09-26 RX ADMIN — NALOXONE HYDROCHLORIDE 0.4 MG: 1 INJECTION PARENTERAL at 18:02

## 2020-09-26 NOTE — ED NOTES
[facility] @ [id number]   Re:  ams. opiate abuse  Dr. Samuels Shows @ [facility]  09/26/20 4845

## 2020-09-26 NOTE — ED PROVIDER NOTES
I independently performed a history and physical on Saranya. 15. All diagnostic, treatment, and disposition decisions were made by myself in conjunction with the advanced practice provider. I have participated in the medical decision making and directed the treatment plan and disposition of the patient. For further details of [facility] emergency department encounter, please see the advanced practice provider's documentation. CHIEF COMPLAINT  Chief Complaint   Patient presents with   Orlando Ortiz     pt works a [employer], stumbled and fell, coworkers told medics he has been lethargic at work x 2 days, medics were told that he was standing up sleeping at work yesterday    Fatigue       Briefly, Sandy Montiel is a 23 y.o. male  who presents to the ED complaining of fatigue confusion. Patiently reportedly found at work \"sleeping while standing up\". Confused. FOCUSED PHYSICAL EXAMINATION  /69   Pulse 67   Temp 98.3 °F (36.8 °C) (Oral)   Resp 16   Ht 5' 8\" (1.727 m)   Wt 188 lb 4.4 oz (85.4 kg)   SpO2 96%   BMI 28.63 kg/m²      Focused physical examination:  General appearance: Sleeping in bed  Skin:  Warm. Dry. Eye:  Extraocular movements intact. Pinpoint pupils bilaterally  Ears, nose, mouth and throat:  Oral mucosa moist,  Neck:  Trachea midline. Heart:  Regular rate and rhythm  Perfusion:  intact  Respiratory:  Lungs clear to auscultation bilaterally. Respirations nonlabored. Abdominal:   Non distended. Nontender  Neurological: Being groggy will give occasional one-word answers. Moves all extremities spontaneously  Musculoskeletal:   Normal ROM, no deformities          Psychiatric:  Normal mood      EKG: Sinus rhythm with sinus arrhythmia rate of 79 bpm with nonspecific ST segment changes seen primarily laterally. Prolonged QTC at 463 ms. Compared to prior from 7/25/2020 lateral ST flattening appears new.     MDM: Patient arrived to the emergency department today initially sleepy groggy. Reportedly has been confused. Found to have pinpoint pupils. After reviewing his past medical notes he was seen that he has had 648 Dilaudid and methadone tablets prescribed within the last 3-1/2 weeks. Patient was given Narcan and immediately became much more awake and alert. Began experiencing acute narcotic withdrawal symptoms which is unsurprising. Now that he is awake he states he has been taking an increased amount of home pain medications due to his chronic sickle cell disease. He notes he took 6 pain tablets just prior to going to work today when he was ultimately sent to the ER.   As he has been taking significant long-acting narcotics I have high concern for recurrent narcosis and he will be admitted to the hospital    During the patient's ED course, the patient was given:  Medications   folic acid (FOLVITE) tablet 2 mg (2 mg Oral Given 9/27/20 1007)   hydroxyurea (HYDREA) chemo capsule 2,000 mg (2,000 mg Oral Not Given 9/27/20 1027)   vitamin B-12 (CYANOCOBALAMIN) tablet 1,000 mcg (1,000 mcg Oral Given 9/27/20 1007)   sodium chloride flush 0.9 % injection 10 mL (10 mLs Intravenous Not Given 9/27/20 2209)   sodium chloride flush 0.9 % injection 10 mL (has no administration in time range)   acetaminophen (TYLENOL) tablet 650 mg (650 mg Oral Given 9/27/20 0444)     Or   acetaminophen (TYLENOL) suppository 650 mg ( Rectal See Alternative 9/27/20 0444)   promethazine (PHENERGAN) tablet 12.5 mg (has no administration in time range)     Or   ondansetron (ZOFRAN) injection 4 mg (has no administration in time range)   enoxaparin (LOVENOX) injection 40 mg (40 mg Subcutaneous Not Given 9/27/20 1008)   polyethylene glycol (GLYCOLAX) packet 17 g (has no administration in time range)   0.9 % sodium chloride infusion ( Intravenous New Bag 9/27/20 1335)   piperacillin-tazobactam (ZOSYN) 3.375 g in dextrose 5 % 50 mL IVPB extended infusion (mini-bag) (3.375 g Intravenous New Bag 9/27/20 2204)   pantoprazole (PROTONIX) tablet 40 mg (has no administration in time range)   0.9 % sodium chloride bolus (0 mLs Intravenous Stopped 9/26/20 1938)   naloxone [facility]) injection 0.4 mg (0.4 mg Intravenous Given 9/26/20 1802)        CLINICAL IMPRESSION  1. Opiate overdose, accidental or unintentional, initial encounter ([facility] [address]    2. Altered mental status, unspecified altered mental status type        DISPOSITION  Admission      This chart was created using Dragon dictation software. Efforts were made by me to ensure accuracy, however some errors may be present due to limitations of this technology.             Jaime Romo MD  09/27/20 5530

## 2020-09-26 NOTE — ED PROVIDER NOTES
EMERGENCY DEPARTMENT ENCOUNTER      This patient was seen and evaluated by the attending physician. Pt Name: Kate Smith  MRN: [id number]  [place] 2000  Date of evaluation: 9/26/2020  Provider: CARMELITA Pink - CNP-C  PCP: Buddy Gutierrez MD  ED Attending: Didier Willis MD    History provided by the patient. CHIEF COMPLAINT:     Chief Complaint   Patient presents with   Mccain Ano     pt works a [employer], stumbled and fell, coworkers told medics he has been lethargic at work x 2 days, medics were told that he was standing up sleeping at work yesterday    Fatigue       HISTORY OF PRESENT ILLNESS:      Kate Smith is a 23 y.o. male who presents [facility]  ED with complaints of lethargy. Patient works at [employer], he apparently went to work today and stumbled and fell, patient been very lethargic at work for the last couple days, states that he is falling asleep. Patient works states that he fell asleep while standing up yesterday. Patient has a history of sickle cell anemia. Denies any pain. He is difficult to arouse but is arousable to touch. He is here for further evaluation. Location:-  Quality:-  Severity:-  Duration:-  Modifying factors:-    Nursing Notes were reviewed     REVIEW OF SYSTEMS:     Review of Systems  All systems, atotal of 10, are reviewed and negative except for those that were just noted in history present illness.         PAST MEDICAL HISTORY:     Past Medical History:   Diagnosis Date    Asthma     Enlarged heart     Sickle cell anemia (HCC)          SURGICAL HISTORY:      Past Surgical History:   Procedure Laterality Date    SPLENECTOMY           CURRENT MEDICATIONS:       Previous Medications    FOLIC ACID (FOLVITE) 1 MG TABLET    Take 2 tablets by mouth daily    HYDROXYUREA (HYDREA) 500 MG CHEMO CAPSULE    Take 4 capsules by mouth daily Unsure of dose    METHADONE (DOLOPHINE) 5 MG TABLET    Take 5 mg by mouth every 4 hours as needed for Pain. VITAMIN B-12 1000 MCG TABLET    Take 1 tablet by mouth daily         ALLERGIES:    Morphine    FAMILY HISTORY:     History reviewed. No pertinent family history. SOCIAL HISTORY:       Social History     Socioeconomic History    Marital status: Single     Spouse name: None    Number of children: None    Years of education: None    Highest education level: None   Occupational History    None   Social Needs    Financial resource strain: None    Food insecurity     Worry: None     Inability: None    Transportation needs     Medical: None     Non-medical: None   Tobacco Use    Smoking status: Never Smoker    Smokeless tobacco: Never Used   Substance and Sexual Activity    Alcohol use: Not Currently    Drug use: Never    Sexual activity: None   Lifestyle    Physical activity     Days per week: None     Minutes per session: None    Stress: None   Relationships    Social connections     Talks on phone: None     Gets together: None     Attends Restorationist service: None     Active member of club or organization: None     Attends meetings of clubs or organizations: None     Relationship status: None    Intimate partner violence     Fear of current or ex partner: None     Emotionally abused: None     Physically abused: None     Forced sexual activity: None   Other Topics Concern    None   Social History Narrative    None       SCREENINGS:   [place] Coma Scale  Eye Opening: Spontaneous  Best Verbal Response: Oriented  Best Motor Response: Obeys commands  [place] Coma Scale Score: 15        PHYSICAL EXAM:       ED Triage Vitals [09/26/20 1608]   BP Temp Temp Source Pulse Resp SpO2 Height Weight   122/62 98.1 °F (36.7 °C) Oral 87 18 92 % -- 170 lb (77.1 kg)       Physical Exam    CONSTITUTIONAL: Awake and alert. Cooperative. Well-developed. Well-nourished.    Vitals:    09/26/20 1608 09/26/20 1702 09/26/20 1805 09/26/20 1825   BP: 122/62 (!) 98/50 115/65 127/75   Pulse: 87 91 90 94   Resp: 18 19 18 26   Temp: 98.1 °F (36.7 °C)      TempSrc: Oral      SpO2: 92% 90% 98% 98%   Weight: 170 lb (77.1 kg)        HENT: Normocephalic. Atraumatic. External ears normal, without discharge. TMs clear bilaterally. No nasal discharge. Oropharynx clear, no erythema. Mucous membranes moist.  EYES: Conjunctiva non-injected, no lid abnormalities noted. No scleral icterus. PERRL. EOM's grossly intact. Anterior chambers clear. NECK: Supple. Normal ROM. No meningismus. No thyroid tenderness or swelling noted. CARDIOVASCULAR: RRR. No Murmer. PULMONARY/CHEST WALL: Effort normal. No tachypnea. Lungs clear to ausculation. ABDOMEN: Normal BS. Soft. Nondistended. No tenderness to palpate. No guarding. No hernias noted. No splenomegaly. Back: Spine is midline. No ecchymosis. No crepitus on palpation. No obvious subluxation of vertebral column. No saddle anesthesia or evidence of cauda equina. /ANORECTAL: Not assessed  MUSKULOSKELETAL: Normal ROM. No acute deformities. No edema. No tenderness to palpate. SKIN: Warm and dry. NEUROLOGICAL:  GCS 15. CN II-XII grossly intact. Strength is 5/5 in allextremities and sensation is intact. PSYCHIATRIC: Patient is lethargic, arousable to touch but does fall asleep during midsentence.         DIAGNOSTIC RESULTS:     LABS:    Results for orders placed or performed during the hospital encounter of 09/26/20   CBC Auto Differential   Result Value Ref Range    WBC 16.1 (H) 4.0 - 11.0 K/uL    RBC 2.04 (L) 4.20 - 5.90 M/uL    Hemoglobin 7.4 (L) 13.5 - 17.5 g/dL    Hematocrit 20.7 (LL) 40.5 - 52.5 %    .3 (H) 80.0 - 100.0 fL    MCH 36.1 (H) 26.0 - 34.0 pg    MCHC 35.6 31.0 - 36.0 g/dL    RDW 24.3 (H) 12.4 - 15.4 %    Platelets 575 327 - 289 K/uL    MPV 9.0 5.0 - 10.5 fL    PLATELET SLIDE REVIEW Adequate     SLIDE REVIEW see below     Neutrophils % 68.0 %    Lymphocytes % 21.0 %    Monocytes % 7.0 %    Eosinophils % 2.0 %    Basophils % 0.0 %    Neutrophils Absolute 11.3 (H) 1.7 - 7.7 K/uL    Lymphocytes Absolute 3.4 1.0 - 5.1 K/uL    Monocytes Absolute 1.1 0.0 - 1.3 K/uL    Eosinophils Absolute 0.3 0.0 - 0.6 K/uL    Basophils Absolute 0.0 0.0 - 0.2 K/uL    Bands Relative 2 0 - 7 %    Anisocytosis 2+ (A)     Macrocytes Occasional (A)     Polychromasia Occasional (A)     Poikilocytes 2+ (A)     Sickle Cells 1+ (A)    Comprehensive Metabolic Panel   Result Value Ref Range    Sodium 137 136 - 145 mmol/L    Potassium 3.7 3.5 - 5.1 mmol/L    Chloride 101 99 - 110 mmol/L    CO2 28 21 - 32 mmol/L    Anion Gap 8 3 - 16    Glucose 104 (H) 70 - 99 mg/dL    BUN 7 7 - 20 mg/dL    CREATININE 0.6 (L) 0.9 - 1.3 mg/dL    GFR Non-African American >60 >60    GFR African American >60 >60    Calcium 9.0 8.3 - 10.6 mg/dL    Total Protein 7.1 6.4 - 8.2 g/dL    Alb 4.0 3.4 - 5.0 g/dL    Albumin/Globulin Ratio 1.3 1.1 - 2.2    Total Bilirubin 5.6 (H) 0.0 - 1.0 mg/dL    Alkaline Phosphatase 88 40 - 129 U/L    ALT 73 (H) 10 - 40 U/L    AST 83 (H) 15 - 37 U/L    Globulin 3.1 g/dL   Troponin   Result Value Ref Range    Troponin <0.01 <0.01 ng/mL   Sample possible blood bank testing   Result Value Ref Range    Specimen Status GIANLUCA    Reticulocytes   Result Value Ref Range    Retic Ct Pct 16.35 (H) 0.50 - 2.18 %    Retic Ct Abs 0.337 M/uL    Immature Retic Fract 0.68 (H) 0.21 - 0.37   Lactic Acid, Plasma   Result Value Ref Range    Lactic Acid 0.5 0.4 - 2.0 mmol/L   POCT Glucose   Result Value Ref Range    POC Glucose 107 (H) 70 - 99 mg/dl    Performed on ACCU-Concurrent Thinking[employer]    EKG 12 Lead   Result Value Ref Range    Ventricular Rate 79 BPM    Atrial Rate 79 BPM    P-R Interval 166 ms    QRS Duration 104 ms    Q-T Interval 404 ms    QTc Calculation (Bazett) 463 ms    P Axis 58 degrees    R Axis 85 degrees    T Axis 3 degrees    Diagnosis       Normal sinus rhythm with sinus arrhythmiaNonspecific T wave abnormalityProlonged QTAbnormal ECGWhen compared with ECG of 25-JUL-2020 18:31,Nonspecific T wave abnormality now evident in Anterolateral leads         RADIOLOGY:  All x-ray studies are viewed/reviewedby me. Formal interpretations per the radiologist are as follows:      XR CHEST PORTABLE   Final Result   No acute cardiopulmonary disease. EKG:  See EKG interpretation by an attending [id number]:   N/A    CRITICAL CARE TIME:   N/A    CONSULTS:  IP CONSULT TO HOSPITALIST      EMERGENCYDEPARTMENT COURSE and DIFFERENTIAL DIAGNOSIS/MDM:   Vitals:    Vitals:    09/26/20 1608 09/26/20 1702 09/26/20 1805 09/26/20 1825   BP: 122/62 (!) 98/50 115/65 127/75   Pulse: 87 91 90 94   Resp: 18 19 18 26   Temp: 98.1 °F (36.7 °C)      TempSrc: Oral      SpO2: 92% 90% 98% 98%   Weight: 170 lb (77.1 kg)          Patient was given the following medications:  Medications   0.9 % sodium chloride bolus (1,000 mLs Intravenous New Bag 9/26/20 1738)   naloxone (NARCAN) injection 0.4 mg (0.4 mg Intravenous Given 9/26/20 1802)         Patient was evaluated by both myself and Lalita Mcallister MD.    Patient presented to the emergency room today with complaints of lethargy, fatigue, patient had a fall today without injury. Patient was very difficult to arouse I had to shake him for him to wake up, during interview he would fall asleep mid sentence. Patient work-up did show an elevated reticulocyte count, chronic anemia with a hemoglobin of 7.4, his white blood cell count was elevated at 16.1 but he denied any infectious symptoms. Patient OARRS report did show a significant uptake in narcotic prescription. Patient has gotten 640 Dilaudid or methadone tablets just this month written by his hematologist.  Patient was given 0.4 of Narcan and became alert and oriented, he did have some vomiting. He was able to tell me that he had been taking more of his pain medicine than prescribed.   Due to the long acting nature of the medications that the patient is taking I did feel that patient required admission to the hospital.  He was initially hypoxic and required oxygen upon arrival.  I did discussion with the patient as well as his mother, they agree with the plan for admission to the hospital.  Patient was evaluated by the attending physician who was in agreement. Patient laboratory studies, radiographic imaging, andassessment were all discussed with the patient and/or patient family. There was shared decision-making between myself, the attending physician, as well as the patient and/or their surrogate and we are all in agreement with admission. There was an opportunity for questions and all questions were answered to the best of my ability and to the satisfaction of the patient and/or patient family. FINAL IMPRESSION:      1. Opiate overdose, accidental or unintentional, initial encounter ([facility] [address]    2. Altered mental status, unspecified altered mental status type          DISPOSITION/PLAN:   DISPOSITIONAdmitted      PATIENT REFERRED TO:  No follow-up provider specified.     DISCHARGE MEDICATIONS:  New Prescriptions    No medications on file                  (Please note that portions of this note were completed with a voice recognition program.  Efforts were made to edit the dictations, but occasionally words are mis-transcribed.)    CARMELITA Joyner CNP-C (electronically signed)        CARMELITA Joyner CNP  09/27/20 0102

## 2020-09-26 NOTE — ED NOTES

## 2020-09-26 NOTE — H&P
Hospital Medicine History & Physical      PCP: Kirt Newman MD    Date of Admission: 9/26/2020    Date of Service: Pt seen/examined on 9/26/2020 and Admitted to Inpatient with expected LOS less than two midnights due to medical therapy. Placed in Observation. Chief Complaint: Lethargic      History Of Present Illness:      23 y.o. male who presented to [facility] with chief complaint of increased lethargy, patient was noted at work where he works as a  at [employer], patient was noted to be increased lethargic feeling sleep while standing yesterday at work and again today 911 was called patient was sent to emergency room where he was lethargic upon receiving 0.4 mg Narcan he woke up nauseated, patient denies any fever chills no chest pain or shortness of breath no cough no sputum no nausea vomiting no diarrhea no melena no medication recent weight loss weight gain no change in appetite he does not smoke or drink alcohol. Per ER physician, patient has been prescribed 640 tablets of Dilaudid and methadone in last 30 days. Patient told ER physician that he has been taking more than prescribed medication. Past Medical History:          Diagnosis Date    Asthma     Enlarged heart     Sickle cell anemia (HCC)        Past Surgical History:          Procedure Laterality Date    SPLENECTOMY         Medications Prior to Admission:      Prior to Admission medications    Medication Sig Start Date End Date Taking? Authorizing Provider   hydroxyurea (HYDREA) 500 MG chemo capsule Take 4 capsules by mouth daily Unsure of dose 8/26/20   CARMELITA Doll CNP   folic acid (FOLVITE) 1 MG tablet Take 2 tablets by mouth daily 8/26/20   CARMELITA Doll CNP   vitamin B-12 1000 MCG tablet Take 1 tablet by mouth daily 8/27/20   CARMELITA Doll CNP   methadone (DOLOPHINE) 5 MG tablet Take 5 mg by mouth every 4 hours as needed for Pain.     Historical Provider, MD Allergies:  Morphine    Social History:      The patient currently lives with his parents    TOBACCO:   reports that he has never smoked. He has never used smokeless tobacco.  ETOH:   reports previous alcohol use. E-Cigarettes Vaping or Juuling     Questions Responses    Vaping Use Never User    Start Date     Does device contain nicotine? Never    Quit Date     Vaping Type             Family History:       Reviewed in detail and negative for DM, CAD, Cancer, CVA. Positive as follows:    History reviewed. No pertinent family history. REVIEW OF SYSTEMS:   Pertinent positives as noted in the HPI. All other systems reviewed and negative. PHYSICAL EXAM PERFORMED:    /75   Pulse 94   Temp 98.1 °F (36.7 °C) (Oral)   Resp 26   Wt 170 lb (77.1 kg)   SpO2 98%   BMI 25.85 kg/m²     General appearance:  No apparent distress, appears stated age and cooperative. HEENT:  Normal cephalic, atraumatic without obvious deformity. Pupils equal, round, and reactive to light. Extra ocular muscles intact. Conjunctivae/corneas clear. Neck: Supple, with full range of motion. No jugular venous distention. Trachea midline. Respiratory:  Normal respiratory effort. Clear to auscultation, bilaterally without Rales/Wheezes/Rhonchi. Cardiovascular:  Regular rate and rhythm with normal S1/S2 without murmurs, rubs or gallops. Abdomen: Soft, non-tender, non-distended with normal bowel sounds. Musculoskeletal:  No clubbing, cyanosis or edema bilaterally. Full range of motion without deformity. Skin: Skin color, texture, turgor normal.  No rashes or lesions. Neurologic:  Neurovascularly intact without any focal sensory/motor deficits.  Cranial nerves: II-XII intact, grossly non-focal.  Psychiatric:  Alert and oriented, thought content appropriate, normal insight  Capillary Refill: Brisk,< 3 seconds   Peripheral Pulses: +2 palpable, equal bilaterally       Labs:     Recent Labs     09/26/20  1623   WBC 16.1*   HGB 7.4* HCT 20.7*        Recent Labs     09/26/20  1623      K 3.7      CO2 28   BUN 7   CREATININE 0.6*   CALCIUM 9.0     Recent Labs     09/26/20  1623   AST 83*   ALT 73*   BILITOT 5.6*   ALKPHOS 88     No results for input(s): INR in the last 72 hours. Recent Labs     09/26/20  1623   TROPONINI <0.01       Urinalysis:      Lab Results   Component Value Date    NITRU Negative 08/14/2020    WBCUA None seen 08/14/2020    RBCUA 0-2 08/14/2020    BLOODU SMALL 08/14/2020    SPECGRAV 1.015 08/14/2020    GLUCOSEU Negative 08/14/2020       Radiology:     CXR: I have reviewed the CXR with the following interpretation: No acute airway  EKG:  I have reviewed the EKG with the following interpretation:     XR CHEST PORTABLE   Final Result   No acute cardiopulmonary disease. ASSESSMENT:    Active Hospital Problems    Diagnosis Date Noted    Opiate overdose ([facility] [address] Ruel Melgar 09/26/2020   Sickle cell disease  Anemia      PLAN:    1. This is a 24-year-old male with a history of sickle cell disease admitted with opiate overdose will admit patient to ICU for close monitoring, check ABG check urine drug screen, check blood cultures, CBC and BMP reviewed, IV hydration, IV Protonix, IV Zosyn for possible aspiration pneumonia keep him n.p.o. overnight repeat labs in a.m.  2.  Opiate overdose unintentionally status post Narcan 0.4 mg continue to monitor closely in ICU may need Narcan. 3.  Sickle cell disease no evidence of sickle cell crisis will follow blood cultures UA. DVT Prophylaxis: Lovenox subcu  Diet: No diet orders on file  Code Status: Full code    PT/OT Eval Status:     Shelton Gtz MD    Thank you Neda Phoenix, MD for the opportunity to be involved in this patient's care. If you have any questions or concerns please feel free to contact me at [phone].

## 2020-09-27 LAB
ALBUMIN SERPL-MCNC: 4 G/DL (ref 3.4–5)
ALP BLD-CCNC: 84 U/L (ref 40–129)
ALT SERPL-CCNC: 65 U/L (ref 10–40)
ANION GAP SERPL CALCULATED.3IONS-SCNC: 7 MMOL/L (ref 3–16)
AST SERPL-CCNC: 63 U/L (ref 15–37)
BILIRUB SERPL-MCNC: 6.2 MG/DL (ref 0–1)
BILIRUBIN DIRECT: 0.7 MG/DL (ref 0–0.3)
BILIRUBIN, INDIRECT: 5.5 MG/DL (ref 0–1)
BUN BLDV-MCNC: 7 MG/DL (ref 7–20)
CALCIUM SERPL-MCNC: 9.1 MG/DL (ref 8.3–10.6)
CHLORIDE BLD-SCNC: 105 MMOL/L (ref 99–110)
CO2: 29 MMOL/L (ref 21–32)
CREAT SERPL-MCNC: 0.6 MG/DL (ref 0.9–1.3)
EKG ATRIAL RATE: 79 BPM
EKG DIAGNOSIS: NORMAL
EKG P AXIS: 58 DEGREES
EKG P-R INTERVAL: 166 MS
EKG Q-T INTERVAL: 404 MS
EKG QRS DURATION: 104 MS
EKG QTC CALCULATION (BAZETT): 463 MS
EKG R AXIS: 85 DEGREES
EKG T AXIS: 3 DEGREES
EKG VENTRICULAR RATE: 79 BPM
GFR AFRICAN AMERICAN: >60
GFR NON-AFRICAN AMERICAN: >60
GLUCOSE BLD-MCNC: 106 MG/DL (ref 70–99)
HCT VFR BLD CALC: 21.9 % (ref 40.5–52.5)
HEMOGLOBIN: 7.9 G/DL (ref 13.5–17.5)
MCH RBC QN AUTO: 36.6 PG (ref 26–34)
MCHC RBC AUTO-ENTMCNC: 35.9 G/DL (ref 31–36)
MCV RBC AUTO: 101.8 FL (ref 80–100)
PDW BLD-RTO: 23.8 % (ref 12.4–15.4)
PLATELET # BLD: 339 K/UL (ref 135–450)
PMV BLD AUTO: 8.9 FL (ref 5–10.5)
POTASSIUM REFLEX MAGNESIUM: 3.9 MMOL/L (ref 3.5–5.1)
RBC # BLD: 2.15 M/UL (ref 4.2–5.9)
SODIUM BLD-SCNC: 141 MMOL/L (ref 136–145)
TOTAL PROTEIN: 7.1 G/DL (ref 6.4–8.2)
WBC # BLD: 13.5 K/UL (ref 4–11)

## 2020-09-27 PROCEDURE — C9113 INJ PANTOPRAZOLE SODIUM, VIA: HCPCS | Performed by: HOSPITALIST

## 2020-09-27 PROCEDURE — 96361 HYDRATE IV INFUSION ADD-ON: CPT

## 2020-09-27 PROCEDURE — 6370000000 HC RX 637 (ALT 250 FOR IP): Performed by: HOSPITALIST

## 2020-09-27 PROCEDURE — 6360000002 HC RX W HCPCS: Performed by: HOSPITALIST

## 2020-09-27 PROCEDURE — 85027 COMPLETE CBC AUTOMATED: CPT

## 2020-09-27 PROCEDURE — 36415 COLL VENOUS BLD VENIPUNCTURE: CPT

## 2020-09-27 PROCEDURE — 80076 HEPATIC FUNCTION PANEL: CPT

## 2020-09-27 PROCEDURE — 96365 THER/PROPH/DIAG IV INF INIT: CPT

## 2020-09-27 PROCEDURE — 93010 ELECTROCARDIOGRAM REPORT: CPT | Performed by: INTERNAL MEDICINE

## 2020-09-27 PROCEDURE — G0378 HOSPITAL OBSERVATION PER HR: HCPCS

## 2020-09-27 PROCEDURE — 96366 THER/PROPH/DIAG IV INF ADDON: CPT

## 2020-09-27 PROCEDURE — 2580000003 HC RX 258: Performed by: HOSPITALIST

## 2020-09-27 PROCEDURE — 80048 BASIC METABOLIC PNL TOTAL CA: CPT

## 2020-09-27 RX ORDER — FOLIC ACID 1 MG/1
2 TABLET ORAL DAILY
Status: DISCONTINUED | OUTPATIENT
Start: 2020-09-27 | End: 2020-09-28 | Stop reason: HOSPADM

## 2020-09-27 RX ORDER — CHOLECALCIFEROL (VITAMIN D3) 125 MCG
1000 CAPSULE ORAL DAILY
Status: DISCONTINUED | OUTPATIENT
Start: 2020-09-27 | End: 2020-09-28 | Stop reason: HOSPADM

## 2020-09-27 RX ORDER — POLYETHYLENE GLYCOL 3350 17 G/17G
17 POWDER, FOR SOLUTION ORAL DAILY PRN
Status: DISCONTINUED | OUTPATIENT
Start: 2020-09-27 | End: 2020-09-28 | Stop reason: HOSPADM

## 2020-09-27 RX ORDER — PANTOPRAZOLE SODIUM 40 MG/1
40 TABLET, DELAYED RELEASE ORAL
Status: DISCONTINUED | OUTPATIENT
Start: 2020-09-28 | End: 2020-09-28 | Stop reason: HOSPADM

## 2020-09-27 RX ORDER — HYDROXYUREA 500 MG/1
2000 CAPSULE ORAL DAILY
Status: DISCONTINUED | OUTPATIENT
Start: 2020-09-27 | End: 2020-09-28 | Stop reason: HOSPADM

## 2020-09-27 RX ORDER — PANTOPRAZOLE SODIUM 40 MG/10ML
40 INJECTION, POWDER, LYOPHILIZED, FOR SOLUTION INTRAVENOUS DAILY
Status: DISCONTINUED | OUTPATIENT
Start: 2020-09-27 | End: 2020-09-27

## 2020-09-27 RX ADMIN — CYANOCOBALAMIN TAB 500 MCG 1000 MCG: 500 TAB at 10:07

## 2020-09-27 RX ADMIN — PIPERACILLIN AND TAZOBACTAM 3.38 G: 3; .375 INJECTION, POWDER, LYOPHILIZED, FOR SOLUTION INTRAVENOUS at 13:36

## 2020-09-27 RX ADMIN — ACETAMINOPHEN 650 MG: 325 TABLET ORAL at 04:44

## 2020-09-27 RX ADMIN — FOLIC ACID 2 MG: 1 TABLET ORAL at 10:07

## 2020-09-27 RX ADMIN — PIPERACILLIN AND TAZOBACTAM 3.38 G: 3; .375 INJECTION, POWDER, LYOPHILIZED, FOR SOLUTION INTRAVENOUS at 04:45

## 2020-09-27 RX ADMIN — SODIUM CHLORIDE: 9 INJECTION, SOLUTION INTRAVENOUS at 04:45

## 2020-09-27 RX ADMIN — PIPERACILLIN AND TAZOBACTAM 3.38 G: 3; .375 INJECTION, POWDER, LYOPHILIZED, FOR SOLUTION INTRAVENOUS at 22:07

## 2020-09-27 RX ADMIN — SODIUM CHLORIDE: 9 INJECTION, SOLUTION INTRAVENOUS at 13:35

## 2020-09-27 ASSESSMENT — PAIN DESCRIPTION - ONSET: ONSET: ON-GOING

## 2020-09-27 ASSESSMENT — PAIN DESCRIPTION - FREQUENCY: FREQUENCY: CONTINUOUS

## 2020-09-27 ASSESSMENT — PAIN DESCRIPTION - LOCATION: LOCATION: GENERALIZED

## 2020-09-27 ASSESSMENT — PAIN SCALES - GENERAL
PAINLEVEL_OUTOF10: 0
PAINLEVEL_OUTOF10: 6

## 2020-09-27 ASSESSMENT — PAIN - FUNCTIONAL ASSESSMENT: PAIN_FUNCTIONAL_ASSESSMENT: ACTIVITIES ARE NOT PREVENTED

## 2020-09-27 ASSESSMENT — PAIN DESCRIPTION - PROGRESSION: CLINICAL_PROGRESSION: NOT CHANGED

## 2020-09-27 ASSESSMENT — PAIN DESCRIPTION - DESCRIPTORS: DESCRIPTORS: SORE

## 2020-09-27 ASSESSMENT — PAIN DESCRIPTION - PAIN TYPE: TYPE: CHRONIC PAIN

## 2020-09-27 NOTE — PROGRESS NOTES
Patient admitted to room 424 from ER. Report bedside w/ Francheska France RN. Patient oriented to room, call light, bed rails, phone, lights and bathroom. Patient instructed about the schedule of the day including: vital sign frequency, lab draws, possible tests, frequency of MD and staff rounds, including RN/MD rounding together at bedside, daily weights, and I &O's. Patient instructed about prescribed diet, how to contact dietary, and television. Bed alarm in place, patient aware of placement and reason. Telemetry box and continuous SPO2 in place, patient aware of placement and reason. Bed locked, in lowest position, side rails up 2/4, call light within reach. Will continue to monitor.

## 2020-09-27 NOTE — ED NOTES
Patient is resting with unlabored breath on his right side. Food at bedside for patient if he wakes up before going up to inpatient.       Elan Lynn RN  09/26/20 2347

## 2020-09-27 NOTE — PLAN OF CARE
4 Eyes Skin Assessment     The patient is being assess for  Admission    I agree that 2 RN's have performed a thorough Head to Toe Skin Assessment on the patient. ALL assessment sites listed below have been assessed. Areas assessed by both nurses: yes  [x]   Head, Face, and Ears   [x]   Shoulders, Back, and Chest  [x]   Arms, Elbows, and Hands   [x]   Coccyx, Sacrum, and Ischum  [x]   Legs, Feet, and Heels        Does the Patient have Skin Breakdown?   No         Shaun Prevention initiated:  No   Wound Care Orders initiated:  No      [facility] nurse consulted for Pressure Injury (Stage 3,4, Unstageable, DTI, NWPT, and Complex wounds):  No      Nurse 1 eSignature: Electronically signed by Gene Marino RN on 9/27/20 at 7:08 AM EDT    **SHARE this note so that the co-signing nurse is able to place an eSignature**    Nurse 2 eSignature: Electronically signed by Gavin Myers RN on 9/28/20 at 1:37 AM EDT

## 2020-09-27 NOTE — PROGRESS NOTES
Secure message to Dr Sage Loomis, \"Pt wants to go home. He claims he has an appointment with them on Tuesday.   Thanks\"  Edis Gupta

## 2020-09-27 NOTE — PLAN OF CARE
Problem: Falls - Risk of:  Goal: Will remain free from falls  Description: Will remain free from falls  Outcome: Ongoing  Note: Pt will remain free from falls this hospitalization. Non skids socks on, bed in lowest position with wheels locked, call light and bedside table in reach. Pt is a A&Ox4, able to ambulate in room.

## 2020-09-27 NOTE — ED NOTES
Pt ambulated to and from restroom without assistance. ED RN Leydi Bess aware. Placed patient back on monitor with both side rails up and all wheels locked on bed. Call light in hand.      John Leggett  09/26/20 6465

## 2020-09-27 NOTE — PROGRESS NOTES
Hospitalist Progress Note      PCP: Kirt Newman MD    Date of Admission: 9/26/2020    Chief Complaint: [address] Course: This is a 59-year-old male with a history of sickle cell followed by heme-onc on Dilaudid and methadone admitted with increased lethargy at work falling asleep while standing status post Narcan 0.4 mg x 1 in the emergency room has remained alert. Subjective: Patient denies any chest pain or shortness of breath no nausea vomiting he is not sure how many Dilaudid and methadone pills he took yesterday. Medications:  Reviewed    Infusion Medications    sodium chloride 125 mL/hr at 09/27/20 8987     Scheduled Medications    folic acid  2 mg Oral Daily    hydroxyurea  2,000 mg Oral Daily    cyanocobalamin  1,000 mcg Oral Daily    pantoprazole  40 mg Intravenous Daily    sodium chloride flush  10 mL Intravenous 2 times per day    enoxaparin  40 mg Subcutaneous Daily    piperacillin-tazobactam  3.375 g Intravenous Q8H     PRN Meds: polyethylene glycol, sodium chloride flush, acetaminophen **OR** acetaminophen, promethazine **OR** ondansetron      Intake/Output Summary (Last 24 hours) at 9/27/2020 0931  Last data filed at 9/27/2020 0437  Gross per 24 hour   Intake 1000 ml   Output 500 ml   Net 500 ml       Physical Exam Performed:    /64   Pulse 75   Temp 98.5 °F (36.9 °C) (Oral)   Resp 14   Ht 5' 8\" (1.727 m)   Wt 188 lb 4.4 oz (85.4 kg)   SpO2 94%   BMI 28.63 kg/m²     General appearance: No apparent distress, appears stated age and cooperative. HEENT: Pupils equal, round, and reactive to light. Conjunctivae/corneas clear. Neck: Supple, with full range of motion. No jugular venous distention. Trachea midline. Respiratory:  Normal respiratory effort. Clear to auscultation, bilaterally without Rales/Wheezes/Rhonchi. Cardiovascular: Regular rate and rhythm with normal S1/S2 without murmurs, rubs or gallops.   Abdomen: Soft, non-tender, non-distended with normal bowel sounds. Musculoskeletal: No clubbing, cyanosis or edema bilaterally. Full range of motion without deformity. Skin: Skin color, texture, turgor normal.  No rashes or lesions. Neurologic:  Neurovascularly intact without any focal sensory/motor deficits. Cranial nerves: II-XII intact, grossly non-focal.  Psychiatric: Alert and oriented, thought content appropriate, normal insight  Capillary Refill: Brisk,< 3 seconds   Peripheral Pulses: +2 palpable, equal bilaterally       Labs:   Recent Labs     09/26/20  1623 09/27/20  0459   WBC 16.1* 13.5*   HGB 7.4* 7.9*   HCT 20.7* 21.9*    339     Recent Labs     09/26/20 1623 09/27/20  0459    141   K 3.7 3.9    105   CO2 28 29   BUN 7 7   CREATININE 0.6* 0.6*   CALCIUM 9.0 9.1     Recent Labs     09/26/20  1623 09/27/20  0459   AST 83* 63*   ALT 73* 65*   BILIDIR  --  0.7*   BILITOT 5.6* 6.2*   ALKPHOS 88 84     No results for input(s): INR in the last 72 hours. Recent Labs     09/26/20 1623   TROPONINI <0.01       Urinalysis:      Lab Results   Component Value Date    NITRU Negative 09/26/2020    WBCUA 0-2 09/26/2020    BACTERIA 1+ 09/26/2020    RBCUA 0-2 09/26/2020    BLOODU TRACE-LYSED 09/26/2020    SPECGRAV 1.015 09/26/2020    GLUCOSEU Negative 09/26/2020       Radiology:  XR CHEST PORTABLE   Final Result   No acute cardiopulmonary disease. Assessment/Plan:    Active Hospital Problems    Diagnosis    Opiate overdose ([facility] [address] [T40.601A]    Opiate antagonist poisoning, accidental or unintentional, initial encounter [T50.7X1A]     1. This is a 24-year-old male admitted with opiate overdose remained stable continue with the current care. 2.  History of sickle cell disease followed by pathology oncology, continue with IV fluid, Zosyn for possible aspiration pneumonia as elevated procalcitonin level repeat CBC and BMP in a.m. consult heme-onc. Continue with home medication.   3.  Anemia of chronic disease stable continue to monitor closely    DVT Prophylaxis: Lovenox subcu  Diet: Diet NPO Effective Now  Code Status: Full Code    PT/OT Eval Status:     Charisma Gutierrez MD

## 2020-09-27 NOTE — ED NOTES
Per ED RN Charlotte Nuñez gave patient a turkey sandwhich,sides of mustard and aldana, pudding, applesauce, and a cup of water     Erma Leggett  09/26/20 1756

## 2020-09-27 NOTE — ED NOTES
Patient states \" He took 6 of his methadone pills for pain prior to work. \" Patient states \" he has been self adjusting his meds to help[ with increased pain lately. \" Patient more awake and alert at this time. Doctor aware.       Sumi Kim RN  09/26/20 2520

## 2020-09-28 VITALS
HEART RATE: 82 BPM | SYSTOLIC BLOOD PRESSURE: 115 MMHG | OXYGEN SATURATION: 96 % | HEIGHT: 68 IN | BODY MASS INDEX: 28.53 KG/M2 | TEMPERATURE: 97.8 F | WEIGHT: 188.27 LBS | DIASTOLIC BLOOD PRESSURE: 66 MMHG | RESPIRATION RATE: 16 BRPM

## 2020-09-28 LAB
ANION GAP SERPL CALCULATED.3IONS-SCNC: 9 MMOL/L (ref 3–16)
BUN BLDV-MCNC: 5 MG/DL (ref 7–20)
CALCIUM SERPL-MCNC: 9 MG/DL (ref 8.3–10.6)
CHLORIDE BLD-SCNC: 107 MMOL/L (ref 99–110)
CO2: 27 MMOL/L (ref 21–32)
CREAT SERPL-MCNC: 0.6 MG/DL (ref 0.9–1.3)
GFR AFRICAN AMERICAN: >60
GFR NON-AFRICAN AMERICAN: >60
GLUCOSE BLD-MCNC: 97 MG/DL (ref 70–99)
HCT VFR BLD CALC: 25.1 % (ref 40.5–52.5)
HEMOGLOBIN: 8.6 G/DL (ref 13.5–17.5)
MCH RBC QN AUTO: 35.6 PG (ref 26–34)
MCHC RBC AUTO-ENTMCNC: 34.3 G/DL (ref 31–36)
MCV RBC AUTO: 103.8 FL (ref 80–100)
PDW BLD-RTO: 22.8 % (ref 12.4–15.4)
PLATELET # BLD: 374 K/UL (ref 135–450)
PMV BLD AUTO: 9 FL (ref 5–10.5)
POTASSIUM SERPL-SCNC: 3.9 MMOL/L (ref 3.5–5.1)
RBC # BLD: 2.42 M/UL (ref 4.2–5.9)
SODIUM BLD-SCNC: 143 MMOL/L (ref 136–145)
WBC # BLD: 12.4 K/UL (ref 4–11)

## 2020-09-28 PROCEDURE — 6370000000 HC RX 637 (ALT 250 FOR IP): Performed by: INTERNAL MEDICINE

## 2020-09-28 PROCEDURE — 80048 BASIC METABOLIC PNL TOTAL CA: CPT

## 2020-09-28 PROCEDURE — 6370000000 HC RX 637 (ALT 250 FOR IP): Performed by: HOSPITALIST

## 2020-09-28 PROCEDURE — 2580000003 HC RX 258: Performed by: HOSPITALIST

## 2020-09-28 PROCEDURE — 36415 COLL VENOUS BLD VENIPUNCTURE: CPT

## 2020-09-28 PROCEDURE — 85027 COMPLETE CBC AUTOMATED: CPT

## 2020-09-28 PROCEDURE — 96366 THER/PROPH/DIAG IV INF ADDON: CPT

## 2020-09-28 PROCEDURE — 6360000002 HC RX W HCPCS: Performed by: HOSPITALIST

## 2020-09-28 PROCEDURE — G0378 HOSPITAL OBSERVATION PER HR: HCPCS

## 2020-09-28 RX ORDER — SACCHAROMYCES BOULARDII 250 MG
250 CAPSULE ORAL 2 TIMES DAILY
Status: DISCONTINUED | OUTPATIENT
Start: 2020-09-28 | End: 2020-09-28 | Stop reason: HOSPADM

## 2020-09-28 RX ORDER — SACCHAROMYCES BOULARDII 250 MG
250 CAPSULE ORAL 2 TIMES DAILY
Qty: 14 CAPSULE | Refills: 0 | Status: SHIPPED | OUTPATIENT
Start: 2020-09-28 | End: 2020-10-05

## 2020-09-28 RX ORDER — AMOXICILLIN AND CLAVULANATE POTASSIUM 875; 125 MG/1; MG/1
1 TABLET, FILM COATED ORAL EVERY 12 HOURS SCHEDULED
Status: DISCONTINUED | OUTPATIENT
Start: 2020-09-28 | End: 2020-09-28 | Stop reason: HOSPADM

## 2020-09-28 RX ORDER — AMOXICILLIN AND CLAVULANATE POTASSIUM 875; 125 MG/1; MG/1
1 TABLET, FILM COATED ORAL EVERY 12 HOURS SCHEDULED
Qty: 14 TABLET | Refills: 0 | Status: SHIPPED | OUTPATIENT
Start: 2020-09-28 | End: 2020-10-05

## 2020-09-28 RX ADMIN — HYDROXYUREA 2000 MG: 500 CAPSULE ORAL at 08:27

## 2020-09-28 RX ADMIN — FOLIC ACID 2 MG: 1 TABLET ORAL at 08:27

## 2020-09-28 RX ADMIN — CYANOCOBALAMIN TAB 500 MCG 1000 MCG: 500 TAB at 08:27

## 2020-09-28 RX ADMIN — PIPERACILLIN AND TAZOBACTAM 3.38 G: 3; .375 INJECTION, POWDER, LYOPHILIZED, FOR SOLUTION INTRAVENOUS at 05:12

## 2020-09-28 RX ADMIN — PANTOPRAZOLE SODIUM 40 MG: 40 TABLET, DELAYED RELEASE ORAL at 05:12

## 2020-09-28 RX ADMIN — Medication 250 MG: at 10:29

## 2020-09-28 RX ADMIN — Medication 10 ML: at 08:28

## 2020-09-28 NOTE — PROGRESS NOTES
Pt discharged home. Pt verbalized understanding of discharge instructions, follow up visits and medications.

## 2020-09-28 NOTE — PLAN OF CARE
LAST [facility] OFFICE NOTE FROM 20       Patient Name: Dorinda Tirado   Patient : 2000   Patient MRN: [id number]   Primary Oncologist: Camilo Blue   Referring Physician:   Date of Service: 2020   Chief Complaint  Sickle cell anemia  Problem List  Sickle cell anemia   Asthma   Chronic pain   Vitamin D deficiency (disorder)  HPI  Mr Matthias Hamilton is a 20year old  man who has sickle cell anemia. He has been managed by [facility] till this time. he is maintained on hydrea but has let his prescription run out He has not required frequent transfusions . He has had pain crises which he states happen every few months. He is treated with oxycodone during these times. he had chest crisis in 2016He states he has an enlarged heart. 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 he was referred to [facility] dental clinic but I am uncertain as to whether he followed up According to notes he has not been consistent with hydrea and vitamin d replacement  Previous Therapies  see above  Current Therapy   Interval History   Patient continues to have L elbow pain, states this pain is 7/10 and that it is in unchanged since he left the hospital. States that he has had chills the past 2 days but denies fevers, abdominal pain, n/v/d. Denies chest pain or shortness of breath. Patient states that increased dose of methadone has helped with the pain. He states that in the past he was on oxycodone but that it started to become less effective so his pain doctor transitioned him to methadone for pain management.    Review of Systems  Per interval history; otherwise 10 point ROS is negative   Vital Signs  Blood pressure: 110/74, Pulse: 78, Temperature: 99.4 F, Respirations: 12, O2 sat: , Pain Scale: 7, Height: 67.5 in, Weight: 172.4 lb, BSA: 1.93, BMI: 26.6 kg/m2   Physical Exam   CONSTITUTIONAL: awake, alert, cooperative, no apparent distress   EYES: pupils equal, round and reactive to light, sclera clear and conjunctiva normal   ENT: Normocephalic, without obvious abnormality, atraumatic multiple teeth have been extracted has broken tooth in left molar area   NECK: supple, symmetrical, no jugular venous distension and no carotid bruits   HEMATOLOGIC/LYMPHATIC: no cervical, supraclavicular or axillary lymphadenopathy   LUNGS: no increased work of breathing and clear to auscultation   CARDIOVASCULAR: regular rate and rhythm, normal S1 and S2, no murmur noted   ABDOMEN: normal bowel sounds x 4, soft, non-distended, non-tender, no masses palpated, no hepatosplenomegaly   MUSCULOSKELETAL: full range of motion noted, tone is normal   NEUROLOGIC: awake, alert, oriented to name, place and time. Motor skills grossly intact. SKIN: Normal skin color, texture, turgor and no jaundice.  appears intact   EXTREMITIES: no LE edema, L elbow swelling     Labs  CBC   Lab Results 09/01/2020 08/26/2020 08/17/2020 07/21/2020 07/07/2020 04/02/2020   CBC                     WBC x 10^3/uL 7.7       10.4 (H) 8.8      RBC x 10^6/uL 3.10 (L)       2.37 (L) 2.23 (L)      HGB g/dL 10.2 (L)       7.9 (L) 7.6 (L)      HCT % 31.0 (L)       21.7 (L) 21.2 (L)      MCV fL 100.0 (H)       91.6 95.1 (H)      MCH pg 32.9 (H)       33.3 (H) 34.1 (H)      MCHC g/dL 32.9       36.4 35.8      RDW-CV, % 17.7 (H)       23.1 (H) 19.8 (H)      PLT x 10^3/uL 296.0       335.0 407.0 (H)      Mee % 63.0       59.3 56.5      LY % 25.2       20.4 (L) 28.9      MO % 11.1       17.1 (H) 12.8 (H)      EO % 0.4 (L)       1.3 1.3      BA % 0.3       1.9 (H) 0.5      Mee # (ANC) x 10^3/uL 4.88       6.15 (H) 4.98      LY # x 10^3/uL 1.95       2.12 2.54      MO # x 10^3/uL 0.86 (H)       1.77 (H) 1.13 (H)      EO # x 10^3/uL 0.03 (L)       0.13 0.11      BA # x 10^3/uL 0.02       0.20 (H) 0.04      CMP   Lab Results 09/01/2020 08/26/2020 08/17/2020 07/21/2020 07/07/2020 04/02/2020 Chemistries                     Glucose mg/dL 107          104      BUN mg/dL 6 (L)          9      Creatinine mg/dL 1.0          0.77      BUN/Creatinine ratio             11.69      Sodium mmol/L 137          141      Potassium mmol/L 5.0          4.2      Chloride mmol/L 110 (H)          108 (H)      CO2 mmol/L 18          26      Anion gap, mmol/L             7      Calcium mg/dL 10.1          9.0      Albumin g/dL 3.8          4.0      Total protein g/dL 9.4 (H)          6.5      Globulin g/dL             2.5      A/G ratio             2      Bilirubin, total mg/dL 1.8 (H)          4.5 (H)      Alkaline phosphatase U/L 74          54      AST/SGOT U/L 48 (H)          22      ALT/SGPT U/L 23          20      GFR non-African American, estimated mL/min/1.73m2 >60.0                  GFR African American, estimated mL/min/1.73m2 >60.0          >60        Lab Results 09/01/2020 08/26/2020 08/17/2020 07/21/2020 07/07/2020 04/02/2020   Anemia Labs                     Ferritin ng/mL             49.4      Imaging  OCM - Patient Care Management   Pain, if applicable:    Date of Service: 09/01/2020  Pain Scale (0-10): 7  Pain Treatment Plan: Use of Opioids  Comment:           ECOG Status 1 Symptoms, but ambulatory. Restricted in physically strenuous activity, but ambulatory and able to carry out work of a light or sedentary nature (e.g., light housework, office work). (Date: 09/01/2020)   Depression Status Was screened; Outcome positive: No; Screening Date: 07/07/2020; Screening Tool: Patient Health Questionnaire (PHQ9)   Psycho-social PHQ-9 Follow-up Plan (if applicable):      Research    Would you like this patient screened for clinical trial eligibility? If yes, please enter the order for \"Research: Screen for Eligibility\"    Assessment & Plan     Sickle Cell Anemia  -Patient takes hydrea and folic acid, Hb improved to 10.2 today  -Continues to endorse L elbow pain.  Patient followed with pain clinic for pain management. Discussed transitioning pain management care here. Pain agreed. -Appears to be quite compliant with his medications    Pain:  Pain contract signed 9/1/2020.  -Start methadone 15mg q8hr and diluadid 4mg q4-6hr prn  -Will send ortho referral for evaluation of L elbow    IV access  -Patient expressed interest in having a port, will send referral     Health maintenance  -Patient has hx of previous dental infections. Previous xray of mandible and maxilla showed abnormalities. He was encouraged to follow up with  dental clinic for further evaluation and management. RTC 2 weeks for NP/PA f/u and labs      . Recent imaging and labs were reviewed and discussed with the patient. I have recommended that the patient follow CDC guidelines for prevention of COVID-19 infection. Anaid Maguire M.D. Note Recipients:      Electronically signed by Conrad Lopez.  Dameon PHELPS 09/01/2020 17:56 EDT

## 2020-09-28 NOTE — FLOWSHEET NOTE
09/28/20 0825   Assessment   Charting Type Shift assessment   Neurological   Neuro (WDL) WDL   Level of Consciousness 0   Orientation Level Oriented X4   Cognition Appropriate judgement; Appropriate safety awareness; Appropriate attention/concentration; Appropriate for developmental age; Follows commands   Language Clear; Appropriate for developmental age   Size R Pupil (mm) 4   R Pupil Shape Round   R Pupil Reaction Brisk   Size L Pupil (mm) 4   L Pupil Shape Round   L Pupil Reaction Brisk   R Hand  Strong   L Hand  Strong   R Foot Dorsiflexion Strong   L Foot Dorsiflexion Strong   R Foot Plantar Flexion Strong   L Foot Plantar Flexion Strong   RUE Motor Response Responds to command;Normal extension;Normal flexion   Sensation RUE Full sensation   LUE Motor Response Responds to command;Normal extension;Normal flexion   Sensation LUE Full sensation   RLE Motor Response Responds to command;Normal extension;Normal flexion   Sensation RLE Full sensation   LLE Motor Response Responds to command;Normal extension;Normal flexion   Sensation LLE Full sensation   Gag Present   NIH/MNIHSS Stroke Scale Assessed No   Blade Coma Scale   Eye Opening 4   Best Verbal Response 5   Best Motor Response 6   Blade Coma Scale Score 15   HEENT   HEENT (WDL) X   Right Eye Impaired vision  (glasses/contacts)   Left Eye Impaired vision   Respiratory   Respiratory (WDL) WDL   Cardiac   Cardiac (WDL) WDL   Rhythm Interpretation   Pulse 82   Cardiac Monitor   Telemetry Monitor On Yes   Telemetry Audible Yes   Telemetry Alarms Set Yes   Gastrointestinal   Abdominal (WDL) WDL   Peripheral Vascular   Peripheral Vascular (WDL) WDL   Edema None   Skin Color/Condition   Skin Color/Condition (WDL) WDL   Skin Integrity   Skin Integrity (WDL) WDL   Musculoskeletal   Musculoskeletal (WDL) WDL   Genitourinary   Genitourinary (WDL) WDL   Flank Tenderness No   Suprapubic Tenderness No   Dysuria No   Anus/Rectum   Anus/Rectum (WDL) WDL Psychosocial   Psychosocial (WDL) WDL

## 2020-09-28 NOTE — CONSULTS
Hematology/Oncology Consultation         Patient:   [facility]       Reason for Consult:  Sickle cell with narcotic overdose    Requesting Physician: No ref. provider found    Chief Complaint:     Chief Complaint   Patient presents with   Zoë Silva     pt works a [employer], stumbled and fell, coworkers told medics he has been lethargic at work x 2 days, medics were told that he was standing up sleeping at work yesterday    Fatigue                  History Obtained from:     patient, electronic medical record    History of Present Illness:     [facility] is a 23 y.o. male  with significant past medical history of sickle cell disease who presents with narcotic OD. He admits to taking additional Methadone that was not prescribed. He is alert today and would like to go home. He has not seen ortho yet for his L elbow pain. He no longer wishes to have a port. No fevers. WBC 16 on admit and now 12. Procal elevated. Possible aspiration PNA. Hgb 7.9.      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)     Sickle cell anemia (HCC)        Past Surgical History:         Procedure Laterality Date    SPLENECTOMY         Current Medications:     Current Facility-Administered Medications   Medication Dose Route Frequency Provider Last Rate Last Dose    folic acid (FOLVITE) tablet 2 mg  2 mg Oral Daily Ann Singer MD   2 mg at 09/28/20 0827    hydroxyurea (HYDREA) chemo capsule 2,000 mg  2,000 mg Oral Daily Ann Singer MD   2,000 mg at 09/28/20 0827    vitamin B-12 (CYANOCOBALAMIN) tablet 1,000 mcg  1,000 mcg Oral Daily Ann Singer MD   1,000 mcg at 09/28/20 0827    polyethylene glycol (GLYCOLAX) packet 17 g  17 g Oral Daily PRN Ann Singer MD        pantoprazole (PROTONIX) tablet 40 mg  40 mg Oral QAM AC Ann Singer MD   40 mg at 09/28/20 0512    sodium chloride flush 0.9 % injection 10 mL  10 mL Intravenous 2 times per day Donna Jasso MD   10 mL at 09/28/20 0828    sodium chloride flush 0.9 % injection 10 mL  10 mL Intravenous PRN Donna Jasso MD        acetaminophen (TYLENOL) tablet 650 mg  650 mg Oral Q6H PRN Donna Jasso MD   650 mg at 09/27/20 0444    Or    acetaminophen (TYLENOL) suppository 650 mg  650 mg Rectal Q6H PRN Donna Jasso MD        promethazine (PHENERGAN) tablet 12.5 mg  12.5 mg Oral Q6H PRN Donna Jasso MD        Or    [facility] injection 4 mg  4 mg Intravenous Q6H PRN Donna Jasso MD        enoxaparin (LOVENOX) injection 40 mg  40 mg Subcutaneous Daily Donna Jasso MD        0.9 % sodium chloride infusion   Intravenous Continuous Donna Jasso  mL/hr at 09/27/20 1335      piperacillin-tazobactam (ZOSYN) 3.375 g in dextrose 5 % 50 mL IVPB extended infusion (mini-bag)  3.375 g Intravenous Chris Rosado MD   Stopped at 09/28/20 1703       Allergies:      Allergies   Allergen Reactions    Morphine Shortness Of Breath     Other reaction(s): Histamine-like Reaction  Has asthma exacerbation with morphine-histamine type reaction         Social History:     Social History     Socioeconomic History    Marital status: Single     Spouse name: Not on file    Number of children: 0    Years of education: Not on file    Highest education level: Not on file   Occupational History    Not on file   Social Needs    Financial resource strain: Not on file    Food insecurity     Worry: Not on file     Inability: Not on file   [employer] needs     Medical: Not on file     Non-medical: Not on file   Tobacco Use    Smoking status: Never Smoker    Smokeless tobacco: Never Used   Substance and Sexual Activity    Alcohol use: Not Currently    Drug use: Never    Sexual activity: Not Currently   Lifestyle    Physical activity     Days per week: Not on file     Minutes per session: Not on file    Stress: Not on file   Relationships    Social connections     Talks on phone: Not on file     Gets together: Not on file     Attends Gnosticism service: Not on file     Active member of club or organization: Not on file     Attends meetings of clubs or organizations: Not on file     Relationship status: Not on file    Intimate partner violence     Fear of current or ex partner: Not on file     Emotionally abused: Not on file     Physically abused: Not on file     Forced sexual activity: Not on file   Other Topics Concern    Not on file   Social History Narrative    Not on file     Social History     Substance and Sexual Activity   Drug Use Never     Social History     Substance and Sexual Activity   Alcohol Use Not Currently     Social History     Substance and Sexual Activity   Sexual Activity Not Currently     Social History     Tobacco Use   Smoking Status Never Smoker   Smokeless Tobacco Never Used       Family History:     History reviewed. No pertinent family history. Review of Systems:     Constitutional: Denies fever, sweats, weight loss. .     Eyes: No visual changes or diplopia. No scleral icterus. ENT: No Headaches, no hearing loss, no  vertigo. No mouth sores or sore throat. Cardiovascular: No chest pain, no dyspnea on exertion, no palpitations, no  loss of consciousness. Respiratory: No cough, no  wheezing, no dyspnea, no sputum production. No hemoptysis. .    Gastrointestinal: No abdominal pain, no appetite loss, no blood in stools. No change in bowel habits. Genitourinary: No dysuria, trouble voiding, or hematuria. Musculoskeletal:  Generalized weakness. No joint complaints. Integumentary: No rash or pruritis. Neurological: No headache, diplopia. No change in gait, balance, or coordination. No paresthesias. Endocrine: No temperature intolerance. No excessive thirst, fluid intake, or urination.    Hematologic/Lymphatic: No abnormal bruising or ecchymoses, no blood clots or swollen lymph nodes. Allergic/Immunologic: No nasal congestion or hives. Physical Exam:     /66   Pulse 82   Temp 97.8 °F (36.6 °C) (Oral)   Resp 16   Ht 5' 8\" (1.727 m)   Wt 188 lb 4.4 oz (85.4 kg)   SpO2 96%   BMI 28.63 kg/m²     CONSTITUTIONAL: awake, alert, cooperative, no apparent distress. EYES:  Pupils equal, round and reactive to light, sclera non-icteric, conjunctiva normal  ENT:  Normocephalic, without obvious abnormality, atraumatic, sinuses nontender on palpation, external ears without lesions, oral pharynx with moist mucus membranes, no mucositis. NECK:  Supple, symmetrical, trachea midline, no adenopathy, thyroid symmetric, not enlarged and no tenderness, skin normal  HEMATOLOGIC/LYMPHATICS:  no cervical lymphadenopathy, no supraclavicular lymphadenopathy, no axillary lymphadenopathy and no inguinal lymphadenopathy  BACK:  Symmetric, no curvature, spinous processes are non-tender on palpation, paraspinous muscles are non-tender on palpation, no costal vertebral tenderness  LUNGS:  Clear to auscultation bilaterally, no crackles or wheezing  CARDIOVASCULAR:  Regular rate and rhythm, normal S1 and S2, no S3 or S4, and no murmur noted  ABDOMEN:  Normal bowel sounds, soft, non-distended, non-tender, no masses palpated, no hepatosplenomegally  MUSCULOSKELETAL:  There is no redness, warmth, or swelling of the joints  NEUROLOGIC:   No focal findings. SKIN:  Scattered bruising and ecchymoses. EXT: without clubbing, cyanosis or edema.       Data:     CBC:  Recent Labs     09/28/20  0842 09/27/20 0459 09/26/20  1623   WBC 12.4* 13.5* 16.1*   HGB 8.6* 7.9* 7.4*   HCT 25.1* 21.9* 20.7*   .8* 101.8* 101.3*    339 281       BMP:  Recent Labs     09/28/20  0842 09/27/20 0459 09/26/20  1623    141 137   K 3.9 3.9 3.7   CO2 27 29 28   BUN 5* 7 7   CREATININE 0.6* 0.6* 0.6*       HEPATIC:  Recent Labs     09/27/20 0459 09/26/20  1623   AST 63* 83*   ALT 65* 73*   ALKPHOS 84 88   PROT 7.1 7. 1   BILITOT 6.2* 5.6*   BILIDIR 0.7*  --        TUMOR MARKERS:  No results for input(s): PSA, CEA, , [id number],  in the last 720 hours. MAGNESIUM:    Lab Results   Component Value Date    MG 1.50 08/17/2020       PT/INR:    No results found for: PTINR    Lab Results   Component Value Date    INR 1.44 08/06/2020       PTT:    No results found for: APTT    U/A:    Lab Results   Component Value Date    COLORU Yellow 09/26/2020    PHUR 6.0 09/26/2020    PHUR 6.0 09/26/2020    WBCUA 0-2 09/26/2020    RBCUA 0-2 09/26/2020    BACTERIA 1+ 09/26/2020    CLARITYU Clear 09/26/2020    SPECGRAV 1.015 09/26/2020    LEUKOCYTESUR Negative 09/26/2020    UROBILINOGEN 2.0 09/26/2020    BILIRUBINUR SMALL 09/26/2020    BLOODU TRACE-LYSED 09/26/2020    GLUCOSEU Negative 09/26/2020       Imaging:          HISTORY:    ORDERING SYSTEM PROVIDED HISTORY: syncope    TECHNOLOGIST PROVIDED HISTORY:    Reason for exam:->syncope    Reason for Exam: Fall (pt works a [employer], stumbled and fell, coworkers told    medics he has been lethargic at work x 2 days, medics were told that he was    standing up sleeping at work yesterday)         FINDINGS:    The cardiac silhouette is likely stable allowing for lordotic AP technique. The lungs are clear.  No infiltrate, pleural fluid or evidence of overt    failure.  Osseous structures are unremarkable.              Impression    No acute cardiopulmonary disease.               Impression:     Sickle Cell   Accidental Narcotic Drug Overdose     Plan:     He is agreeable to dc today. He is aware not to use more Methadone than is prescribed  He will dc on Augmentin X 7 days due to elevated WBC and procalcitonin due to possible aspiration PNA. Discussed with hospitalist.   Follow up in the [facility] office in one week. No narcotic refills today. Thank you very much for allowing me to participate in the care of this patient.     Teresa Clemente, CNP   Medical

## 2020-09-28 NOTE — DISCHARGE SUMMARY
continued to monitor closely      Physical Exam Performed:     /66   Pulse 82   Temp 97.8 °F (36.6 °C) (Oral)   Resp 16   Ht 5' 8\" (1.727 m)   Wt 188 lb 4.4 oz (85.4 kg)   SpO2 96%   BMI 28.63 kg/m²       General appearance:  No apparent distress, appears stated age and cooperative. HEENT:  Normal cephalic, atraumatic without obvious deformity. Pupils equal, round, and reactive to light. Extra ocular muscles intact. Conjunctivae/corneas clear. Neck: Supple, with full range of motion. No jugular venous distention. Trachea midline. Respiratory:  Normal respiratory effort. Clear to auscultation, bilaterally without Rales/Wheezes/Rhonchi. Cardiovascular:  Regular rate and rhythm with normal S1/S2 without murmurs, rubs or gallops. Abdomen: Soft, non-tender, non-distended with normal bowel sounds. Musculoskeletal:  No clubbing, cyanosis or edema bilaterally. Full range of motion without deformity. Skin: Skin color, texture, turgor normal.  No rashes or lesions. Neurologic:  Neurovascularly intact without any focal sensory/motor deficits. Cranial nerves: II-XII intact, grossly non-focal.  Psychiatric:  Alert and oriented, thought content appropriate, normal insight  Capillary Refill: Brisk,< 3 seconds   Peripheral Pulses: +2 palpable, equal bilaterally       Labs: For convenience and continuity at follow-up the following most recent labs are provided:      CBC:    Lab Results   Component Value Date    WBC 12.4 09/28/2020    HGB 8.6 09/28/2020    HCT 25.1 09/28/2020     09/28/2020       Renal:    Lab Results   Component Value Date     09/28/2020    K 3.9 09/28/2020    K 3.9 09/27/2020     09/28/2020    CO2 27 09/28/2020    BUN 5 09/28/2020    CREATININE 0.6 09/28/2020    CALCIUM 9.0 09/28/2020         Significant Diagnostic Studies    Radiology:   XR CHEST PORTABLE   Final Result   No acute cardiopulmonary disease.                 Consults:     IP CONSULT TO HOSPITALIST  IP CONSULT TO HEM/ONC  IP CONSULT TO SOCIAL WORK    Disposition:   home       Condition at Discharge: Stable    Discharge Instructions/Follow-up:  [facility] as outpt    Code Status: FULL    Activity: activity as tolerated    Diet: regular diet      Discharge Medications:     Discharge Medication List as of 9/28/2020 10:42 AM           Details   saccharomyces boulardii (FLORASTOR) 250 MG capsule Take 1 capsule by mouth 2 times daily for 7 days, Disp-14 capsule,R-0Normal      amoxicillin-clavulanate (AUGMENTIN) 875-125 MG per tablet Take 1 tablet by mouth every 12 hours for 7 days, Disp-14 tablet,R-0Normal              Details   hydroxyurea (HYDREA) 500 MG chemo capsule Take 4 capsules by mouth daily Unsure of dose, Disp-120 BENSON,K-1JMSTS      folic acid (FOLVITE) 1 MG tablet Take 2 tablets by mouth daily, Disp-30 tablet,R-0Print      vitamin B-12 1000 MCG tablet Take 1 tablet by mouth daily, Disp-30 tablet,R-3Print      methadone (DOLOPHINE) 5 MG tablet Take 5 mg by mouth every 4 hours as needed for Pain. Historical Med             Time Spent on discharge is more than 15 minutes in the examination, evaluation, counseling and review of medications and discharge plan. Signed:    Fausto Harris MD   9/28/2020      Thank you Anila Milian MD for the opportunity to be involved in this patient's care. If you have any questions or concerns please feel free to contact me at [phone].

## 2020-09-28 NOTE — PLAN OF CARE
Problem: Falls - Risk of:  Goal: Will remain free from falls  Description: Will remain free from falls  Outcome: Completed  Goal: Absence of physical injury  Description: Absence of physical injury  Outcome: Completed     Problem: Pain:  Goal: Pain level will decrease  Description: Pain level will decrease  Outcome: Completed  Goal: Control of acute pain  Description: Control of acute pain  Outcome: Completed  Goal: Control of chronic pain  Description: Control of chronic pain  Outcome: Completed

## 2020-11-20 ENCOUNTER — HOSPITAL ENCOUNTER (EMERGENCY)
Age: 20
Discharge: HOME | End: 2020-11-20
Payer: COMMERCIAL

## 2020-11-20 VITALS
DIASTOLIC BLOOD PRESSURE: 70 MMHG | WEIGHT: 188 LBS | OXYGEN SATURATION: 94 % | RESPIRATION RATE: 18 BRPM | SYSTOLIC BLOOD PRESSURE: 110 MMHG | BODY MASS INDEX: 28.49 KG/M2 | HEIGHT: 68 IN | HEART RATE: 71 BPM | TEMPERATURE: 98.7 F

## 2020-11-20 LAB
A/G RATIO: 1.4 (ref 1.1–2.2)
ALBUMIN SERPL-MCNC: 4.3 G/DL (ref 3.4–5)
ALP BLD-CCNC: 73 U/L (ref 40–129)
ALT SERPL-CCNC: 34 U/L (ref 10–40)
ANION GAP SERPL CALCULATED.3IONS-SCNC: 11 MMOL/L (ref 3–16)
ANISOCYTOSIS: ABNORMAL
AST SERPL-CCNC: 79 U/L (ref 15–37)
ATYPICAL LYMPHOCYTE RELATIVE PERCENT: 3 % (ref 0–6)
BANDED NEUTROPHILS RELATIVE PERCENT: 1 % (ref 0–7)
BASOPHILS ABSOLUTE: 0 K/UL (ref 0–0.2)
BASOPHILS RELATIVE PERCENT: 0 %
BILIRUB SERPL-MCNC: 4.3 MG/DL (ref 0–1)
BUN BLDV-MCNC: 7 MG/DL (ref 7–20)
CALCIUM SERPL-MCNC: 9.4 MG/DL (ref 8.3–10.6)
CHLORIDE BLD-SCNC: 107 MMOL/L (ref 99–110)
CO2: 23 MMOL/L (ref 21–32)
CREAT SERPL-MCNC: <0.5 MG/DL (ref 0.9–1.3)
EOSINOPHILS ABSOLUTE: 0.7 K/UL (ref 0–0.6)
EOSINOPHILS RELATIVE PERCENT: 5 %
GFR AFRICAN AMERICAN: >60
GFR NON-AFRICAN AMERICAN: >60
GLOBULIN: 3 G/DL
GLUCOSE BLD-MCNC: 112 MG/DL (ref 70–99)
HCT VFR BLD CALC: 23.9 % (ref 40.5–52.5)
HCT VFR BLD CALC: 24.1 % (ref 40.5–52.5)
HEMOGLOBIN: 8.5 G/DL (ref 13.5–17.5)
IMMATURE RETIC FRACT: 0.58 (ref 0.21–0.37)
LYMPHOCYTES ABSOLUTE: 3.5 K/UL (ref 1–5.1)
LYMPHOCYTES RELATIVE PERCENT: 22 %
MACROCYTES: ABNORMAL
MCH RBC QN AUTO: 35.3 PG (ref 26–34)
MCHC RBC AUTO-ENTMCNC: 35.2 G/DL (ref 31–36)
MCV RBC AUTO: 100.1 FL (ref 80–100)
MICROCYTES: ABNORMAL
MONOCYTES ABSOLUTE: 0.8 K/UL (ref 0–1.3)
MONOCYTES RELATIVE PERCENT: 6 %
NEUTROPHILS ABSOLUTE: 8.8 K/UL (ref 1.7–7.7)
NEUTROPHILS RELATIVE PERCENT: 63 %
PDW BLD-RTO: 20.3 % (ref 12.4–15.4)
PLATELET # BLD: 472 K/UL (ref 135–450)
PLATELET SLIDE REVIEW: ABNORMAL
PMV BLD AUTO: 9.2 FL (ref 5–10.5)
POIKILOCYTES: ABNORMAL
POLYCHROMASIA: ABNORMAL
POTASSIUM SERPL-SCNC: 5 MMOL/L (ref 3.5–5.1)
RBC # BLD: 2.4 M/UL (ref 4.2–5.9)
RETICULOCYTE ABSOLUTE COUNT: 0.32 M/UL
RETICULOCYTE COUNT PCT: 13.44 % (ref 0.5–2.18)
SICKLE CELLS: ABNORMAL
SLIDE REVIEW: ABNORMAL
SODIUM BLD-SCNC: 141 MMOL/L (ref 136–145)
TOTAL PROTEIN: 7.3 G/DL (ref 6.4–8.2)
WBC # BLD: 13.8 K/UL (ref 4–11)

## 2020-11-20 PROCEDURE — 85045 AUTOMATED RETICULOCYTE COUNT: CPT

## 2020-11-20 PROCEDURE — 6360000002 HC RX W HCPCS: Performed by: NURSE PRACTITIONER

## 2020-11-20 PROCEDURE — 2580000003 HC RX 258: Performed by: NURSE PRACTITIONER

## 2020-11-20 PROCEDURE — 85025 COMPLETE CBC W/AUTO DIFF WBC: CPT

## 2020-11-20 PROCEDURE — 96374 THER/PROPH/DIAG INJ IV PUSH: CPT

## 2020-11-20 PROCEDURE — 80053 COMPREHEN METABOLIC PANEL: CPT

## 2020-11-20 PROCEDURE — 96372 THER/PROPH/DIAG INJ SC/IM: CPT

## 2020-11-20 PROCEDURE — 36415 COLL VENOUS BLD VENIPUNCTURE: CPT

## 2020-11-20 PROCEDURE — 99284 EMERGENCY DEPT VISIT MOD MDM: CPT

## 2020-11-20 PROCEDURE — 96375 TX/PRO/DX INJ NEW DRUG ADDON: CPT

## 2020-11-20 RX ORDER — ONDANSETRON 2 MG/ML
4 INJECTION INTRAMUSCULAR; INTRAVENOUS ONCE
Status: COMPLETED | OUTPATIENT
Start: 2020-11-20 | End: 2020-11-20

## 2020-11-20 RX ORDER — 0.9 % SODIUM CHLORIDE 0.9 %
1000 INTRAVENOUS SOLUTION INTRAVENOUS ONCE
Status: COMPLETED | OUTPATIENT
Start: 2020-11-20 | End: 2020-11-20

## 2020-11-20 RX ADMIN — ONDANSETRON 4 MG: 2 INJECTION INTRAMUSCULAR; INTRAVENOUS at 20:11

## 2020-11-20 RX ADMIN — HYDROMORPHONE HYDROCHLORIDE 1 MG: 1 INJECTION, SOLUTION INTRAMUSCULAR; INTRAVENOUS; SUBCUTANEOUS at 21:14

## 2020-11-20 RX ADMIN — SODIUM CHLORIDE 1000 ML: 9 INJECTION, SOLUTION INTRAVENOUS at 20:10

## 2020-11-20 RX ADMIN — HYDROMORPHONE HYDROCHLORIDE 1 MG: 1 INJECTION, SOLUTION INTRAMUSCULAR; INTRAVENOUS; SUBCUTANEOUS at 20:11

## 2020-11-20 ASSESSMENT — PAIN SCALES - GENERAL
PAINLEVEL_OUTOF10: 6
PAINLEVEL_OUTOF10: 4
PAINLEVEL_OUTOF10: 8
PAINLEVEL_OUTOF10: 8
PAINLEVEL_OUTOF10: 6

## 2020-11-20 ASSESSMENT — PAIN DESCRIPTION - FREQUENCY: FREQUENCY: CONTINUOUS

## 2020-11-20 ASSESSMENT — PAIN DESCRIPTION - DESCRIPTORS: DESCRIPTORS: ACHING

## 2020-11-20 ASSESSMENT — PAIN DESCRIPTION - PAIN TYPE: TYPE: ACUTE PAIN

## 2020-11-20 ASSESSMENT — PAIN DESCRIPTION - ORIENTATION: ORIENTATION: LEFT;RIGHT;LOWER

## 2020-11-20 ASSESSMENT — PAIN DESCRIPTION - LOCATION: LOCATION: LEG

## 2020-11-21 NOTE — ED PROVIDER NOTES
[address] [facility]  ED  EMERGENCY DEPARTMENT ENCOUNTER        Pt Name: Kate Smith  MRN: [id number]  [place] 2000  Date of evaluation: 11/20/2020  Provider: CARMELITA Samuel CNP  PCP: Buddy Gutierrez MD    SHONNA. I have evaluated this patient. My supervising physician was available for consultation. CHIEF COMPLAINT       Chief Complaint   Patient presents with    Sickle Cell Pain Crisis     ambulated to triage with complaint of SCC. HR 84 Spo2 94 temp 97.7;  c/o bilateeral leg pain since last night ran out of pain meds; Oxycotin 10mg        HISTORY OF PRESENT ILLNESS   (Location, Timing/Onset, Context/Setting, Quality, Duration, Modifying Factors, Severity, Associated Signs and Symptoms)  Note limiting factors. Kate Smith is a 23 y.o. male with medical history of sickle cell, sepsis, asthma, opioid overdose who presents the ED with complaints of pain into his right knee that he attributes with a sickle cell crisis. Patient does note that he ran out of his oxycodone 10 mg yesterday as he was only given an 8-day supply. He does say he still has his oxycodone 20 mg ER. On 11/10 patient was prescribed oxycodone 20 mg ER, 60 pills for a 30-day supply. Patient said he had contacted Dr. Michelle Xiao's visit to get a refill, however no one had called back yet. Denies any associated nausea, vomiting, diarrhea, short of air, chest pain, cough, numbness, tingling, weakness, headache, lightheaded, dizzy, urinary symptoms, or fevers. Denies any recent trauma, accidents, injuries, or falls. Nursing Notes were all reviewed and agreed with or any disagreements were addressed in the HPI. Review of medical records:  Patient was admitted to the hospital on 9/26/2020 for an opioid overdose. He was given Narcan and was more responsive.   He was prescribed 640 tablets of Dilaudid and methadone in the last 30 days and he did admit to taking more than well-developed and normal weight. HENT:      Head: Normocephalic and atraumatic. Nose: Nose normal.   Eyes:      General:         Right eye: No discharge. Left eye: No discharge. Neck:      Musculoskeletal: Normal range of motion. Cardiovascular:      Rate and Rhythm: Normal rate and regular rhythm. Pulses:           Radial pulses are 2+ on the right side and 2+ on the left side. Dorsalis pedis pulses are 2+ on the right side and 2+ on the left side. Heart sounds: Normal heart sounds. Pulmonary:      Effort: Pulmonary effort is normal. No respiratory distress. Breath sounds: Normal breath sounds. Musculoskeletal:      Right knee: He exhibits decreased range of motion. He exhibits no swelling and no bony tenderness. Tenderness found. Right lower leg: No edema. Left lower leg: No edema. Skin:     General: Skin is warm and dry. Coloration: Skin is not pale. Neurological:      General: No focal deficit present. Mental Status: He is alert and oriented to person, place, and time. Cranial Nerves: Cranial nerves are intact. Sensory: Sensation is intact. Motor: Motor function is intact. Psychiatric:         Behavior: Behavior normal. Behavior is cooperative.          DIAGNOSTIC RESULTS   LABS:    Labs Reviewed   CBC WITH AUTO DIFFERENTIAL - Abnormal; Notable for the following components:       Result Value    WBC 13.8 (*)     RBC 2.40 (*)     Hemoglobin 8.5 (*)     Hematocrit 24.1 (*)     .1 (*)     MCH 35.3 (*)     RDW 20.3 (*)     Platelets 119 (*)     Neutrophils Absolute 8.8 (*)     Eosinophils Absolute 0.7 (*)     Anisocytosis 2+ (*)     Macrocytes Occasional (*)     Microcytes Occasional (*)     Polychromasia Occasional (*)     Poikilocytes 3+ (*)     Sickle Cells 2+ (*)     All other components within normal limits    Narrative:     Performed at:  [facility]) - [address],  [place], [place] [address]   Phone [phone]   COMPREHENSIVE METABOLIC PANEL - Abnormal; Notable for the following components:    Glucose 112 (*)     CREATININE <0.5 (*)     Total Bilirubin 4.3 (*)     AST 79 (*)     All other components within normal limits    Narrative:     Performed at:  [address], [address] [place] [address] [place]   Phone [phone]   RETICULOCYTES - Abnormal; Notable for the following components:    Retic Ct Pct 13.44 (*)     Immature Retic Fract 0.58 (*)     Hematocrit 23.9 (*)     All other components within normal limits    Narrative:     Performed at:  [address], [facility]   Phone [phone]       All other labs were within normal range or not returned as of this dictation. EKG: All EKG's are interpreted by the Emergency Department Physician in the absence of a cardiologist.  Please see their note for interpretation of EKG. RADIOLOGY:   Non-plain film images such as CT, Ultrasound and MRI are read by the radiologist. Plain radiographic images are visualized and preliminarily interpreted by the ED Provider with the below findings:        Interpretation per the Radiologist below, if available at the time of this note:    No orders to display     No results found.         PROCEDURES   Unless otherwise noted below, none     Procedures    CRITICAL CARE TIME   N/A    CONSULTS:  None      EMERGENCY DEPARTMENT COURSE and DIFFERENTIAL DIAGNOSIS/MDM:   Vitals:    Vitals:    11/20/20 1849 11/20/20 2017 11/20/20 2145   BP: 121/73 (!) 108/57 110/70   Pulse: 88 73 71   Resp: 18 18 18   Temp: 98.7 °F (37.1 °C)     TempSrc: Oral     SpO2: 95% 92% 94%   Weight: 188 lb (85.3 kg)     Height: 5' 8\" (1.727 m)         Patient was given the following medications:  Medications   0.9 % sodium chloride bolus (0 mLs Intravenous Stopped 11/20/20 2119)   HYDROmorphone (DILAUDID) injection 1 mg (1 mg Intravenous Given 11/20/20 2011)   ondansetron (ZOFRAN) injection 4 mg (4 mg Intravenous Given 11/20/20 2011)   HYDROmorphone (DILAUDID) injection 1 mg (1 mg Intramuscular Given 11/20/20 2114)             Pertinent Labs & Imaging studies reviewed. (See chart for details)   -  Patient seen and evaluated in the emergency department. -  Triage and nursing notes reviewed and incorporated. -  Old chart records reviewed and incorporated. -  Patient case was not discussed with attending physician, although Dr. Radha Ravi was available for consultation.   -  Differential diagnosis includes: arthritis, septic arthritis, knee strain, sprain, dislocation, fracture, sickle cell crisi, DVT, vs COVID-19.  -  Work-up included:  See above CBC, CMP, reticulocyte count  -  ED treatment included:  Normal Saline, Dilaudid, Zofran  - Consults: none  -  Results discussed with patient. Labs show  CBC with WBC 13.8, RBC 2.4, hemoglobin 8.5, hematocrit 24.1, .1, MCH 35.3, RDW 20.3, platelets 003, absolute neutrophil 8.8. CMP with glucose 112, total bili 4.3. Reticulocyte count 13.44, immature reticulocyte 0.58 as this appears consistent with previous results. Patient said his current pain level is now 6/10. Additional Dilaudid is ordered. Patient said he is feeling better. Instructed to call Dr. Ellen Coley office if he needs a refill of his OxyContin 10 mg.  10 you taking your oxycodone 20 mg. Pt was given strict return precautions. The patient is agreeable with plan of care and disposition.  -  Disposition:  home    FINAL IMPRESSION      1. Sickle cell pain crisis ([facility] [address]    2.  Acute pain of right knee          DISPOSITION/PLAN   DISPOSITION  11/20/2020 09:26:29 PM      PATIENT REFERREDTO:  Annia Darden MD  [address]  [phone]    Call in 2 days  As needed, If symptoms worsen    [facility]  ED  [facility] [address] [phone]  Go to   As needed    [facility] Yajaira Soto MD  [address]    [facility]  [address]  [phone]    Call in 1 day        DISCHARGE MEDICATIONS:  Discharge Medication List as of 11/20/2020  9:48 PM          DISCONTINUED MEDICATIONS:  Discharge Medication List as of 11/20/2020  9:48 PM                 (Please note that portions of this note were completed with a voice recognition program.  Efforts were made to edit the dictations but occasionally words are mis-transcribed.)    CARMELITA Quiroga CNP (electronically signed)            CARMELITA Quiroga CNP  11/21/20 0110

## 2020-12-01 ENCOUNTER — HOSPITAL ENCOUNTER (INPATIENT)
Age: 20
LOS: 8 days | Discharge: HOME | DRG: 811 | End: 2020-12-09
Attending: INTERNAL MEDICINE | Admitting: INTERNAL MEDICINE
Payer: COMMERCIAL

## 2020-12-01 ENCOUNTER — APPOINTMENT (OUTPATIENT)
Dept: GENERAL RADIOLOGY | Age: 20
DRG: 811 | End: 2020-12-01
Payer: COMMERCIAL

## 2020-12-01 LAB
A/G RATIO: 1.4 (ref 1.1–2.2)
A/G RATIO: 1.6 (ref 1.1–2.2)
ALBUMIN SERPL-MCNC: 4.3 G/DL (ref 3.4–5)
ALBUMIN SERPL-MCNC: 4.6 G/DL (ref 3.4–5)
ALP BLD-CCNC: 88 U/L (ref 40–129)
ALP BLD-CCNC: 99 U/L (ref 40–129)
ALT SERPL-CCNC: 14 U/L (ref 10–40)
ALT SERPL-CCNC: 17 U/L (ref 10–40)
ANION GAP SERPL CALCULATED.3IONS-SCNC: 10 MMOL/L (ref 3–16)
ANION GAP SERPL CALCULATED.3IONS-SCNC: 11 MMOL/L (ref 3–16)
ANISOCYTOSIS: ABNORMAL
ANISOCYTOSIS: ABNORMAL
AST SERPL-CCNC: 29 U/L (ref 15–37)
AST SERPL-CCNC: 36 U/L (ref 15–37)
BACTERIA: ABNORMAL /HPF
BANDED NEUTROPHILS RELATIVE PERCENT: 1 % (ref 0–7)
BASOPHILS ABSOLUTE: 0 K/UL (ref 0–0.2)
BASOPHILS ABSOLUTE: 0 K/UL (ref 0–0.2)
BASOPHILS RELATIVE PERCENT: 0 %
BASOPHILS RELATIVE PERCENT: 0 %
BILIRUB SERPL-MCNC: 5.1 MG/DL (ref 0–1)
BILIRUB SERPL-MCNC: 5.2 MG/DL (ref 0–1)
BILIRUBIN URINE: ABNORMAL
BLOOD, URINE: ABNORMAL
BUN BLDV-MCNC: 8 MG/DL (ref 7–20)
BUN BLDV-MCNC: 9 MG/DL (ref 7–20)
CALCIUM SERPL-MCNC: 9.2 MG/DL (ref 8.3–10.6)
CALCIUM SERPL-MCNC: 9.2 MG/DL (ref 8.3–10.6)
CHLORIDE BLD-SCNC: 104 MMOL/L (ref 99–110)
CHLORIDE BLD-SCNC: 105 MMOL/L (ref 99–110)
CLARITY: CLEAR
CO2: 22 MMOL/L (ref 21–32)
CO2: 23 MMOL/L (ref 21–32)
COLOR: ABNORMAL
CREAT SERPL-MCNC: 0.8 MG/DL (ref 0.9–1.3)
CREAT SERPL-MCNC: 0.9 MG/DL (ref 0.9–1.3)
EKG ATRIAL RATE: 70 BPM
EKG DIAGNOSIS: NORMAL
EKG P AXIS: 5 DEGREES
EKG P-R INTERVAL: 150 MS
EKG Q-T INTERVAL: 388 MS
EKG QRS DURATION: 100 MS
EKG QTC CALCULATION (BAZETT): 419 MS
EKG R AXIS: 81 DEGREES
EKG T AXIS: 18 DEGREES
EKG VENTRICULAR RATE: 70 BPM
EOSINOPHILS ABSOLUTE: 0 K/UL (ref 0–0.6)
EOSINOPHILS ABSOLUTE: 0.8 K/UL (ref 0–0.6)
EOSINOPHILS RELATIVE PERCENT: 0 %
EOSINOPHILS RELATIVE PERCENT: 3 %
EPITHELIAL CELLS, UA: ABNORMAL /HPF (ref 0–5)
FINE CASTS, UA: ABNORMAL /LPF (ref 0–2)
GFR AFRICAN AMERICAN: >60
GFR AFRICAN AMERICAN: >60
GFR NON-AFRICAN AMERICAN: >60
GFR NON-AFRICAN AMERICAN: >60
GLOBULIN: 2.9 G/DL
GLOBULIN: 3 G/DL
GLUCOSE BLD-MCNC: 102 MG/DL (ref 70–99)
GLUCOSE BLD-MCNC: 98 MG/DL (ref 70–99)
GLUCOSE URINE: NEGATIVE MG/DL
HCT VFR BLD CALC: 22.7 % (ref 40.5–52.5)
HCT VFR BLD CALC: 23 % (ref 40.5–52.5)
HCT VFR BLD CALC: 23.9 % (ref 40.5–52.5)
HEMATOLOGY PATH CONSULT: NO
HEMATOLOGY PATH CONSULT: NORMAL
HEMATOLOGY PATH CONSULT: YES
HEMOGLOBIN: 8.1 G/DL (ref 13.5–17.5)
HEMOGLOBIN: 8.6 G/DL (ref 13.5–17.5)
IMMATURE RETIC FRACT: 0.69 (ref 0.21–0.37)
KETONES, URINE: NEGATIVE MG/DL
LACTIC ACID: 1.2 MMOL/L (ref 0.4–2)
LEUKOCYTE ESTERASE, URINE: NEGATIVE
LYMPHOCYTES ABSOLUTE: 4 K/UL (ref 1–5.1)
LYMPHOCYTES ABSOLUTE: 7.6 K/UL (ref 1–5.1)
LYMPHOCYTES RELATIVE PERCENT: 16 %
LYMPHOCYTES RELATIVE PERCENT: 30 %
MCH RBC QN AUTO: 35.3 PG (ref 26–34)
MCH RBC QN AUTO: 35.4 PG (ref 26–34)
MCHC RBC AUTO-ENTMCNC: 35.8 G/DL (ref 31–36)
MCHC RBC AUTO-ENTMCNC: 36 G/DL (ref 31–36)
MCV RBC AUTO: 98 FL (ref 80–100)
MCV RBC AUTO: 98.9 FL (ref 80–100)
MICROSCOPIC EXAMINATION: YES
MONOCYTES ABSOLUTE: 0.5 K/UL (ref 0–1.3)
MONOCYTES ABSOLUTE: 2.5 K/UL (ref 0–1.3)
MONOCYTES RELATIVE PERCENT: 10 %
MONOCYTES RELATIVE PERCENT: 2 %
MUCUS: ABNORMAL /LPF
NEUTROPHILS ABSOLUTE: 17.2 K/UL (ref 1.7–7.7)
NEUTROPHILS ABSOLUTE: 17.9 K/UL (ref 1.7–7.7)
NEUTROPHILS RELATIVE PERCENT: 67 %
NEUTROPHILS RELATIVE PERCENT: 71 %
NITRITE, URINE: NEGATIVE
NUCLEATED RED BLOOD CELLS: 1 /100 WBC
NUCLEATED RED BLOOD CELLS: 3 /100 WBC
PDW BLD-RTO: 21.4 % (ref 12.4–15.4)
PDW BLD-RTO: 22.3 % (ref 12.4–15.4)
PH UA: 6 (ref 5–8)
PLATELET # BLD: 287 K/UL (ref 135–450)
PLATELET # BLD: 304 K/UL (ref 135–450)
PLATELET SLIDE REVIEW: ADEQUATE
PLATELET SLIDE REVIEW: ADEQUATE
PMV BLD AUTO: 8.5 FL (ref 5–10.5)
PMV BLD AUTO: 8.5 FL (ref 5–10.5)
POIKILOCYTES: ABNORMAL
POIKILOCYTES: ABNORMAL
POLYCHROMASIA: ABNORMAL
POTASSIUM REFLEX MAGNESIUM: 4 MMOL/L (ref 3.5–5.1)
POTASSIUM REFLEX MAGNESIUM: 4.4 MMOL/L (ref 3.5–5.1)
PROCALCITONIN: 0.2 NG/ML (ref 0–0.15)
PROTEIN UA: 30 MG/DL
RBC # BLD: 2.3 M/UL (ref 4.2–5.9)
RBC # BLD: 2.44 M/UL (ref 4.2–5.9)
RBC UA: ABNORMAL /HPF (ref 0–4)
RETICULOCYTE ABSOLUTE COUNT: 0.33 M/UL
RETICULOCYTE COUNT PCT: 14.04 % (ref 0.5–2.18)
SARS-COV-2, NAAT: NOT DETECTED
SICKLE CELLS: ABNORMAL
SLIDE REVIEW: ABNORMAL
SLIDE REVIEW: ABNORMAL
SODIUM BLD-SCNC: 136 MMOL/L (ref 136–145)
SODIUM BLD-SCNC: 139 MMOL/L (ref 136–145)
SPECIFIC GRAVITY UA: 1.02 (ref 1–1.03)
TOTAL PROTEIN: 7.3 G/DL (ref 6.4–8.2)
TOTAL PROTEIN: 7.5 G/DL (ref 6.4–8.2)
URINE REFLEX TO CULTURE: ABNORMAL
URINE TYPE: ABNORMAL
UROBILINOGEN, URINE: 0.2 E.U./DL
WBC # BLD: 25.2 K/UL (ref 4–11)
WBC # BLD: 25.3 K/UL (ref 4–11)
WBC UA: ABNORMAL /HPF (ref 0–5)

## 2020-12-01 PROCEDURE — 71045 X-RAY EXAM CHEST 1 VIEW: CPT

## 2020-12-01 PROCEDURE — 80053 COMPREHEN METABOLIC PANEL: CPT

## 2020-12-01 PROCEDURE — 6360000002 HC RX W HCPCS: Performed by: STUDENT IN AN ORGANIZED HEALTH CARE EDUCATION/TRAINING PROGRAM

## 2020-12-01 PROCEDURE — 2580000003 HC RX 258: Performed by: INTERNAL MEDICINE

## 2020-12-01 PROCEDURE — 84145 PROCALCITONIN (PCT): CPT

## 2020-12-01 PROCEDURE — 6360000002 HC RX W HCPCS: Performed by: INTERNAL MEDICINE

## 2020-12-01 PROCEDURE — 99285 EMERGENCY DEPT VISIT HI MDM: CPT

## 2020-12-01 PROCEDURE — U0002 COVID-19 LAB TEST NON-CDC: HCPCS

## 2020-12-01 PROCEDURE — 85025 COMPLETE CBC W/AUTO DIFF WBC: CPT

## 2020-12-01 PROCEDURE — 2580000003 HC RX 258: Performed by: STUDENT IN AN ORGANIZED HEALTH CARE EDUCATION/TRAINING PROGRAM

## 2020-12-01 PROCEDURE — 93005 ELECTROCARDIOGRAM TRACING: CPT | Performed by: STUDENT IN AN ORGANIZED HEALTH CARE EDUCATION/TRAINING PROGRAM

## 2020-12-01 PROCEDURE — 81001 URINALYSIS AUTO W/SCOPE: CPT

## 2020-12-01 PROCEDURE — 96374 THER/PROPH/DIAG INJ IV PUSH: CPT

## 2020-12-01 PROCEDURE — 83605 ASSAY OF LACTIC ACID: CPT

## 2020-12-01 PROCEDURE — 36415 COLL VENOUS BLD VENIPUNCTURE: CPT

## 2020-12-01 PROCEDURE — 1200000000 HC SEMI PRIVATE

## 2020-12-01 PROCEDURE — 96376 TX/PRO/DX INJ SAME DRUG ADON: CPT

## 2020-12-01 PROCEDURE — 96375 TX/PRO/DX INJ NEW DRUG ADDON: CPT

## 2020-12-01 PROCEDURE — 93010 ELECTROCARDIOGRAM REPORT: CPT | Performed by: INTERNAL MEDICINE

## 2020-12-01 PROCEDURE — 6370000000 HC RX 637 (ALT 250 FOR IP): Performed by: INTERNAL MEDICINE

## 2020-12-01 PROCEDURE — 85045 AUTOMATED RETICULOCYTE COUNT: CPT

## 2020-12-01 PROCEDURE — 87040 BLOOD CULTURE FOR BACTERIA: CPT

## 2020-12-01 RX ORDER — SODIUM CHLORIDE 9 MG/ML
INJECTION, SOLUTION INTRAVENOUS CONTINUOUS
Status: DISCONTINUED | OUTPATIENT
Start: 2020-12-01 | End: 2020-12-07

## 2020-12-01 RX ORDER — HYDROXYUREA 500 MG/1
1000 CAPSULE ORAL 2 TIMES DAILY
Status: DISCONTINUED | OUTPATIENT
Start: 2020-12-01 | End: 2020-12-09 | Stop reason: HOSPADM

## 2020-12-01 RX ORDER — 0.9 % SODIUM CHLORIDE 0.9 %
1000 INTRAVENOUS SOLUTION INTRAVENOUS ONCE
Status: COMPLETED | OUTPATIENT
Start: 2020-12-01 | End: 2020-12-01

## 2020-12-01 RX ORDER — ONDANSETRON 2 MG/ML
4 INJECTION INTRAMUSCULAR; INTRAVENOUS EVERY 6 HOURS PRN
Status: DISCONTINUED | OUTPATIENT
Start: 2020-12-01 | End: 2020-12-09 | Stop reason: HOSPADM

## 2020-12-01 RX ORDER — KETOROLAC TROMETHAMINE 30 MG/ML
15 INJECTION, SOLUTION INTRAMUSCULAR; INTRAVENOUS ONCE
Status: COMPLETED | OUTPATIENT
Start: 2020-12-01 | End: 2020-12-01

## 2020-12-01 RX ORDER — ACETAMINOPHEN 650 MG/1
650 SUPPOSITORY RECTAL EVERY 6 HOURS PRN
Status: DISCONTINUED | OUTPATIENT
Start: 2020-12-01 | End: 2020-12-09 | Stop reason: HOSPADM

## 2020-12-01 RX ORDER — ACETAMINOPHEN 325 MG/1
650 TABLET ORAL EVERY 6 HOURS PRN
Status: DISCONTINUED | OUTPATIENT
Start: 2020-12-01 | End: 2020-12-09 | Stop reason: HOSPADM

## 2020-12-01 RX ORDER — SODIUM CHLORIDE 0.9 % (FLUSH) 0.9 %
10 SYRINGE (ML) INJECTION EVERY 12 HOURS SCHEDULED
Status: DISCONTINUED | OUTPATIENT
Start: 2020-12-01 | End: 2020-12-09 | Stop reason: HOSPADM

## 2020-12-01 RX ORDER — SODIUM CHLORIDE 0.9 % (FLUSH) 0.9 %
10 SYRINGE (ML) INJECTION PRN
Status: DISCONTINUED | OUTPATIENT
Start: 2020-12-01 | End: 2020-12-09 | Stop reason: HOSPADM

## 2020-12-01 RX ORDER — HYDROXYUREA 500 MG/1
2000 CAPSULE ORAL DAILY
Status: DISCONTINUED | OUTPATIENT
Start: 2020-12-01 | End: 2020-12-01 | Stop reason: SDUPTHER

## 2020-12-01 RX ORDER — ACETAMINOPHEN 500 MG
1000 TABLET ORAL EVERY 6 HOURS PRN
Status: DISCONTINUED | OUTPATIENT
Start: 2020-12-01 | End: 2020-12-09 | Stop reason: HOSPADM

## 2020-12-01 RX ORDER — OXYCODONE HYDROCHLORIDE 5 MG/1
10 TABLET ORAL EVERY 4 HOURS PRN
Status: DISCONTINUED | OUTPATIENT
Start: 2020-12-01 | End: 2020-12-09 | Stop reason: HOSPADM

## 2020-12-01 RX ORDER — FOLIC ACID 1 MG/1
2 TABLET ORAL DAILY
Status: DISCONTINUED | OUTPATIENT
Start: 2020-12-01 | End: 2020-12-09 | Stop reason: HOSPADM

## 2020-12-01 RX ORDER — KETOROLAC TROMETHAMINE 30 MG/ML
15 INJECTION, SOLUTION INTRAMUSCULAR; INTRAVENOUS EVERY 6 HOURS
Status: COMPLETED | OUTPATIENT
Start: 2020-12-01 | End: 2020-12-02

## 2020-12-01 RX ORDER — PANTOPRAZOLE SODIUM 40 MG/1
40 TABLET, DELAYED RELEASE ORAL
Status: DISCONTINUED | OUTPATIENT
Start: 2020-12-01 | End: 2020-12-09 | Stop reason: HOSPADM

## 2020-12-01 RX ORDER — POLYETHYLENE GLYCOL 3350 17 G/17G
17 POWDER, FOR SOLUTION ORAL DAILY PRN
Status: DISCONTINUED | OUTPATIENT
Start: 2020-12-01 | End: 2020-12-09 | Stop reason: HOSPADM

## 2020-12-01 RX ORDER — ONDANSETRON 2 MG/ML
4 INJECTION INTRAMUSCULAR; INTRAVENOUS ONCE
Status: COMPLETED | OUTPATIENT
Start: 2020-12-01 | End: 2020-12-01

## 2020-12-01 RX ORDER — PROMETHAZINE HYDROCHLORIDE 25 MG/1
12.5 TABLET ORAL EVERY 6 HOURS PRN
Status: DISCONTINUED | OUTPATIENT
Start: 2020-12-01 | End: 2020-12-09 | Stop reason: HOSPADM

## 2020-12-01 RX ADMIN — SODIUM CHLORIDE: 9 INJECTION, SOLUTION INTRAVENOUS at 06:41

## 2020-12-01 RX ADMIN — KETOROLAC TROMETHAMINE 15 MG: 30 INJECTION, SOLUTION INTRAMUSCULAR at 00:57

## 2020-12-01 RX ADMIN — ONDANSETRON HYDROCHLORIDE 4 MG: 2 INJECTION, SOLUTION INTRAMUSCULAR; INTRAVENOUS at 00:56

## 2020-12-01 RX ADMIN — ACETAMINOPHEN 1000 MG: 500 TABLET ORAL at 18:53

## 2020-12-01 RX ADMIN — OXYCODONE 10 MG: 5 TABLET ORAL at 06:37

## 2020-12-01 RX ADMIN — KETOROLAC TROMETHAMINE 15 MG: 30 INJECTION, SOLUTION INTRAMUSCULAR; INTRAVENOUS at 17:02

## 2020-12-01 RX ADMIN — ENOXAPARIN SODIUM 40 MG: 40 INJECTION SUBCUTANEOUS at 08:36

## 2020-12-01 RX ADMIN — SODIUM CHLORIDE: 9 INJECTION, SOLUTION INTRAVENOUS at 23:02

## 2020-12-01 RX ADMIN — FOLIC ACID 2 MG: 1 TABLET ORAL at 08:36

## 2020-12-01 RX ADMIN — SODIUM CHLORIDE 1000 ML: 9 INJECTION, SOLUTION INTRAVENOUS at 00:56

## 2020-12-01 RX ADMIN — Medication 10 ML: at 23:02

## 2020-12-01 RX ADMIN — KETOROLAC TROMETHAMINE 15 MG: 30 INJECTION, SOLUTION INTRAMUSCULAR; INTRAVENOUS at 23:01

## 2020-12-01 RX ADMIN — KETOROLAC TROMETHAMINE 15 MG: 30 INJECTION, SOLUTION INTRAMUSCULAR; INTRAVENOUS at 12:27

## 2020-12-01 RX ADMIN — OXYCODONE 10 MG: 5 TABLET ORAL at 13:38

## 2020-12-01 RX ADMIN — HYDROMORPHONE HYDROCHLORIDE 0.5 MG: 1 INJECTION, SOLUTION INTRAMUSCULAR; INTRAVENOUS; SUBCUTANEOUS at 08:43

## 2020-12-01 RX ADMIN — HYDROMORPHONE HYDROCHLORIDE 1 MG: 1 INJECTION, SOLUTION INTRAMUSCULAR; INTRAVENOUS; SUBCUTANEOUS at 02:47

## 2020-12-01 RX ADMIN — PANTOPRAZOLE SODIUM 40 MG: 40 TABLET, DELAYED RELEASE ORAL at 06:38

## 2020-12-01 RX ADMIN — HYDROMORPHONE HYDROCHLORIDE 0.5 MG: 1 INJECTION, SOLUTION INTRAMUSCULAR; INTRAVENOUS; SUBCUTANEOUS at 17:15

## 2020-12-01 RX ADMIN — KETOROLAC TROMETHAMINE 15 MG: 30 INJECTION, SOLUTION INTRAMUSCULAR; INTRAVENOUS at 06:38

## 2020-12-01 RX ADMIN — HYDROXYUREA 1000 MG: 500 CAPSULE ORAL at 08:36

## 2020-12-01 RX ADMIN — HYDROXYUREA 1000 MG: 500 CAPSULE ORAL at 23:02

## 2020-12-01 RX ADMIN — HYDROMORPHONE HYDROCHLORIDE 1 MG: 1 INJECTION, SOLUTION INTRAMUSCULAR; INTRAVENOUS; SUBCUTANEOUS at 00:57

## 2020-12-01 ASSESSMENT — PAIN SCALES - GENERAL
PAINLEVEL_OUTOF10: 9
PAINLEVEL_OUTOF10: 8
PAINLEVEL_OUTOF10: 10
PAINLEVEL_OUTOF10: 4
PAINLEVEL_OUTOF10: 7
PAINLEVEL_OUTOF10: 10
PAINLEVEL_OUTOF10: 9
PAINLEVEL_OUTOF10: 10
PAINLEVEL_OUTOF10: 9

## 2020-12-01 ASSESSMENT — PAIN DESCRIPTION - FREQUENCY: FREQUENCY: CONTINUOUS

## 2020-12-01 ASSESSMENT — PAIN DESCRIPTION - LOCATION: LOCATION: BACK;CHEST;RIB CAGE

## 2020-12-01 ASSESSMENT — PAIN DESCRIPTION - PAIN TYPE: TYPE: ACUTE PAIN

## 2020-12-01 ASSESSMENT — PAIN DESCRIPTION - DESCRIPTORS: DESCRIPTORS: THROBBING;SHARP

## 2020-12-01 NOTE — PROGRESS NOTES
Patient admitted to room _207___ ER. Patient oriented to room, call light, bed rails, phone, lights and bathroom. Patient instructed about the schedule of the day including: vital sign frequency, lab draws, possible tests, frequency of MD and staff rounds, daily weights, I &O's and prescribed diet. Bed alarm deferred patient low fall risk and refuses alarm. patient aware of placement and reason. Bed locked, in lowest position, side rails up 2/4, call light within reach. Recliner Assessment:     Patient is able to demonstrate the ability to move from a reclining position to an upright position within the recliner.

## 2020-12-01 NOTE — ED NOTES
Pts urinal emptied. Pt sts pain is now 7-10. Pt given 2 rebecca mist per request. Pt is laying in bed intermittently sleeping, resp nonlabored and pwd.       Tasia Arnold RN  12/01/20 1911

## 2020-12-01 NOTE — H&P
Hospital Medicine History & Physical      PCP: Jasmin Gonzalez MD    Date of Admission: 12/1/2020    Date of Service: Pt seen/examined on 12/1/2020    Chief Complaint: Sickle cell crisis    History Of Present Illness:    23 y.o. male who presented to the hospital with a chief complaint of uncontrolled pain. The patient has a history of sickle cell anemia and is on chronic narcotic therapy for pain as needed. He states that he developed chest and back pain in the last 24 hours. He thinks it may be all related to the weather change. He has oxycodone at home that he took but that could not control his symptoms. He called EMS who brought him here. He used to follow-up with hematology at the [facility], but as an adult he follows with  for his medical needs. In emergency department he was treated with IV narcotic therapy with good response. When I saw him he felt he was doing a little better. He he will however be admitted for pain control. 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 Authorizing Provider   hydroxyurea (HYDREA) 500 MG chemo capsule Take 4 capsules by mouth daily Unsure of dose 8/26/20   CARMELITA Doll CNP   folic acid (FOLVITE) 1 MG tablet Take 2 tablets by mouth daily 8/26/20   CARMELITA Doll CNP   vitamin B-12 1000 MCG tablet Take 1 tablet by mouth daily 8/27/20   CARMELITA Doll CNP   methadone (DOLOPHINE) 5 MG tablet Take 5 mg by mouth every 4 hours as needed for Pain. Historical Provider, MD       Allergies:  Morphine    Social History:      TOBACCO:   reports that he has never smoked.  He has never used smokeless tobacco.  ETOH:   reports previous alcohol use. Family History:      History reviewed. No pertinent family history. REVIEW OF SYSTEMS:   Constitutional:  Negative for fever,chills or night sweats  ENT:  Negative for rhinorrhea, epistaxis, hoarseness, sore throat. Respiratory: Negative for SOB or wheezing   Cardiovascular:   Negative for  chest pain, palpitations   Gastrointestinal:  Negative for nausea, vomiting, diarrhea  Genitourinary:  Negative for polyuria, dysuria   Hematologic/Lymphatic:  Negative for  bleeding tendency, easy bruising  Musculoskeletal:   Positive for chest and back pain  Neurologic:  Negative for  confusion,dysarthria. Skin:  Negative for itching,rash  Psychiatric:  Negative for depression,anxiety, agitation. Endocrine:  Negative for polydipsia,polyuria,heat /cold intolerance. PHYSICAL EXAM:  /67   Pulse 94   Resp 21   Ht 5' 8\" (1.727 m)   Wt 188 lb (85.3 kg)   SpO2 92%   BMI 28.59 kg/m²     General appearance:  No apparent distress, appears stated age and cooperative. HEENT:  Normal cephalic, atraumatic without obvious deformity. Pupils equal, round, and reactive to light. Extra ocular muscles intact. Conjunctivae/corneas clear. Neck: Supple, with full range of motion. No jugular venous distention. Trachea midline. Respiratory:  Normal respiratory effort. Clear to auscultation, bilaterally without Rales/Wheezes/Rhonchi. Cardiovascular: Tachycardic  Abdomen: Soft, non-tender, non-distended with normal bowel sounds. Musculoskeletal:  No clubbing, cyanosis or edema bilaterally. Full range of motion without deformity. Skin: Skin color, texture, turgor normal.  No rashes or lesions. Neurologic:  Neurovascularly intact without any focal sensory/motor deficits.  Cranial nerves: II-XII intact, grossly non-focal.  Psychiatric:  Alert and oriented, thought content appropriate, normal insight  Capillary Refill: Brisk,< 3 seconds   Peripheral Pulses: +2 palpable, equal bilaterally       Labs:   Recent Labs     12/01/20 0107   WBC 25.3*   HGB 8.6*   HCT 23.9*        Recent Labs     12/01/20 0107      K 4.0      CO2 23   BUN 8   CREATININE 0.8*   CALCIUM 9.2     Recent Labs     12/01/20 0107   AST 29   ALT 17   BILITOT 5.1*   ALKPHOS 99     No results for input(s): INR in the last 72 hours. No results for input(s): Deja Jasvir in the last 72 hours. Urinalysis:      Lab Results   Component Value Date    NITRU Negative 09/26/2020    WBCUA 0-2 09/26/2020    BACTERIA 1+ 09/26/2020    RBCUA 0-2 09/26/2020    BLOODU TRACE-LYSED 09/26/2020    SPECGRAV 1.015 09/26/2020    GLUCOSEU Negative 09/26/2020       Radiology:   XR CHEST PORTABLE   Final Result   1. No radiographic evidence of lung consolidation. 2. Mild cardiomegaly. 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 oxycodone  Resume folic acid and Hydrea  Supplemental oxygen therapy, aggressive IV fluids  Follow H&H, retic counts, transfuse if necessary    DVT Prophylaxis: Lovenox  Diet: No diet orders on file  Code Status: Prior    Dispo -admit to [facility] on telemetry      Thank you for the opportunity to be involved in this patient's care.       (Please note that portions of this note were completed with a voice recognition program. Efforts were made to edit the dictations but occasionally words are mis-transcribed.)

## 2020-12-01 NOTE — ED NOTES
During handoff pts O2 noted to be 86-88. Yeimy Delafield per Deedee Ponce RN pt had been on 2L NC and so pt placed on 2L O2 now 94%.      Deidra Rivas RN  12/01/20 3039

## 2020-12-01 NOTE — ED NOTES
Pt is resting in bed unable to stay awake. Pt will wake up and request more pain medications.       Gael Bajwa RN  12/01/20 4346

## 2020-12-01 NOTE — LETTER
[phone]  Phone: [phone]             December 9, 2020    Patient: [facility]   YOB: 2000   Date of Visit: 12/1/2020       To Whom It May Concern:    [facility] was seen and treated in our facility  beginning 12/1/2020 until 12/9/2020. He may return to work on 12/10/2020.       Sincerely,       Adele Sandhoff, BSN, RN, CMSRN        Signature:__________________________________

## 2020-12-01 NOTE — ED NOTES
Writer called pts mother to inform her that pt is being admitted here and not [facility] as mother requested. Mother has no concerns and no further questions at this time.      Paras Stock RN  12/01/20 2901

## 2020-12-01 NOTE — ED PROVIDER NOTES
together: None     Attends Restorationism service: None     Active member of club or organization: None     Attends meetings of clubs or organizations: None     Relationship status: None    Intimate partner violence     Fear of current or ex partner: None     Emotionally abused: None     Physically abused: None     Forced sexual activity: None   Other Topics Concern    None   Social History Narrative    None        Review of Systems   10 total systems reviewed and found to be negative unless otherwise noted in HPI     Physical Exam   /67   Pulse 94   Resp 21   Ht 5' 8\" (1.727 m)   Wt 188 lb (85.3 kg)   SpO2 92%   BMI 28.59 kg/m²      CONSTITUTIONAL: Well appearing,but in acute distress   HEAD: atraumatic, normocephalic   EYES: PERRL, No injection, discharge or scleral icterus. ENT: Moist mucous membranes. NECK: Normal ROM, NO LAD   CARDIOVASCULAR: Regular rate and rhythm. No murmurs or gallop. PULMONARY/CHEST: Airway patent. No retractions. Breath sounds clear with good air entry bilaterally. ABDOMEN: Soft, Non-distended and non-tender, without guarding or rebound. SKIN: Acyanotic, warm, dry   MUSCULOSKELETAL: No swelling, tenderness or deformity   NEUROLOGICAL: Awake and oriented x 3. Pulses intact. Grossly nonfocal   Nursing note and vitals reviewed.      ED Course & Medical Decision Making   Medications   0.9 % sodium chloride bolus (0 mLs Intravenous Stopped 12/1/20 0203)   ketorolac (TORADOL) injection 15 mg (15 mg Intravenous Given 12/1/20 0057)   ondansetron (ZOFRAN) injection 4 mg (4 mg Intravenous Given 12/1/20 0056)   HYDROmorphone (DILAUDID) injection 1 mg (1 mg Intravenous Given 12/1/20 0057)   HYDROmorphone (DILAUDID) injection 1 mg (1 mg Intravenous Given 12/1/20 0247)      Labs Reviewed   CBC WITH AUTO DIFFERENTIAL - Abnormal; Notable for the following components:       Result Value    WBC 25.3 (*)     RBC 2.44 (*)     Hemoglobin 8.6 (*)     Hematocrit 23.9 (*)     MCH 35.3 (*) RDW 21.4 (*)     Neutrophils Absolute 17.2 (*)     Lymphocytes Absolute 7.6 (*)     nRBC 3 (*)     Anisocytosis 1+ (*)     Poikilocytes 2+ (*)     Sickle Cells 3+ (*)     All other components within normal limits    Narrative:     Performed at:  [facility] [address],  [place], [facility]   Phone [phone]   COMPREHENSIVE METABOLIC PANEL W/ REFLEX TO MG FOR LOW K - Abnormal; Notable for the following components:    Glucose 102 (*)     CREATININE 0.8 (*)     Total Bilirubin 5.1 (*)     All other components within normal limits    Narrative:     Performed at:  [facility]) - [place] [address],  [place], [place]   Phone [phone]   PROCALCITONIN - Abnormal; Notable for the following components:    Procalcitonin 0.20 (*)     All other components within normal limits    Narrative:     Performed at:  [place] [address],  [place], [facility]   Phone [phone]   CULTURE, BLOOD 1   LACTIC ACID, PLASMA    Narrative:     Performed at:  [place] [address],  [place], [employer]   Phone [phone]    Narrative:     Performed at:  [facility]) [place] [address],  [place], [facility]   Phone [phone]   URINE RT REFLEX TO CULTURE   RETICULOCYTES      XR CHEST PORTABLE   Final Result   1. No radiographic evidence of lung consolidation. 2. Mild cardiomegaly. EKG INTERPRETATION:  EKG by my preliminary interpretation shows sinus rhythm with rate of 70, normal axis, normal intervals, with no ST changes indicative of ischemia at this time. There is LVH    PROCEDURES:   Procedures    ASSESSMENT AND PLAN:  Nataliia Nguyen is a 23 y.o. male history of sickle cell presenting with chest pain as well as body pain. On exam patient was in pain when he presented. He was also tachycardic.   He was given a dose of Dilaudid IV fluids Toradol for his pain. Obtain and significant for leukocytosis of 25,000, as well as elevated procalcitonin. COVID-19 is negative reticulocyte count pending. X-ray shows no pneumonia. Abnormal findings might be secondary to dehydration. Given a liter of IV fluids and another dose of Dilaudid. At this point his pain is persisted. I think he needs admission for pain crisis and further treatment. Hospitalist consulted patient admitted to their service for further evaluation and treatment. ClINICAL IMPRESSION:  1. Sickle cell pain crisis [facility])        DISPOSITION Admitted 12/01/2020 04:14:11 AM   -Findings and recommendations explained to patient. He expressed understanding and agreed with the plan. Amy Apodaca MD (electronically signed)  12/1/2020  _________________________________________________________________________________________  _________________________________________________________________________________________  This record is transcribed utilizing voice recognition technology. There are inherent limitations in this technology. In addition, there may be limitations in editing of this report. If there are any discrepancies, please contact me directly.         Amy Apodaca MD  12/01/20 5822

## 2020-12-02 PROBLEM — D72.829 LEUKOCYTOSIS: Status: ACTIVE | Noted: 2020-12-02

## 2020-12-02 PROBLEM — R09.02 HYPOXIA: Status: ACTIVE | Noted: 2020-12-02

## 2020-12-02 PROBLEM — D64.9 ANEMIA: Status: ACTIVE | Noted: 2020-12-02

## 2020-12-02 PROBLEM — R07.89 OTHER CHEST PAIN: Status: ACTIVE | Noted: 2020-12-02

## 2020-12-02 LAB
ABO/RH: NORMAL
ANISOCYTOSIS: ABNORMAL
ANISOCYTOSIS: ABNORMAL
ANTIBODY SCREEN: NORMAL
BASOPHILS ABSOLUTE: 0 K/UL (ref 0–0.2)
BASOPHILS ABSOLUTE: 0.2 K/UL (ref 0–0.2)
BASOPHILS RELATIVE PERCENT: 0 %
BASOPHILS RELATIVE PERCENT: 1 %
EOSINOPHILS ABSOLUTE: 0 K/UL (ref 0–0.6)
EOSINOPHILS ABSOLUTE: 0 K/UL (ref 0–0.6)
EOSINOPHILS RELATIVE PERCENT: 0 %
EOSINOPHILS RELATIVE PERCENT: 0 %
HCT VFR BLD CALC: 19.3 % (ref 40.5–52.5)
HCT VFR BLD CALC: 22.1 % (ref 40.5–52.5)
HEMATOLOGY PATH CONSULT: NO
HEMATOLOGY PATH CONSULT: NO
HEMOGLOBIN: 6.8 G/DL (ref 13.5–17.5)
HEMOGLOBIN: 7.8 G/DL (ref 13.5–17.5)
HOWELL-JOLLY BODIES: ABNORMAL
IMMATURE RETIC FRACT: 0.68 (ref 0.21–0.37)
LYMPHOCYTES ABSOLUTE: 2.4 K/UL (ref 1–5.1)
LYMPHOCYTES ABSOLUTE: 2.9 K/UL (ref 1–5.1)
LYMPHOCYTES RELATIVE PERCENT: 13 %
LYMPHOCYTES RELATIVE PERCENT: 14 %
MCH RBC QN AUTO: 33.9 PG (ref 26–34)
MCH RBC QN AUTO: 34.9 PG (ref 26–34)
MCHC RBC AUTO-ENTMCNC: 35.5 G/DL (ref 31–36)
MCHC RBC AUTO-ENTMCNC: 35.5 G/DL (ref 31–36)
MCV RBC AUTO: 95.5 FL (ref 80–100)
MCV RBC AUTO: 98.2 FL (ref 80–100)
MONOCYTES ABSOLUTE: 0.8 K/UL (ref 0–1.3)
MONOCYTES ABSOLUTE: 0.8 K/UL (ref 0–1.3)
MONOCYTES RELATIVE PERCENT: 4 %
MONOCYTES RELATIVE PERCENT: 4 %
NEUTROPHILS ABSOLUTE: 15.4 K/UL (ref 1.7–7.7)
NEUTROPHILS ABSOLUTE: 16.7 K/UL (ref 1.7–7.7)
NEUTROPHILS RELATIVE PERCENT: 82 %
NEUTROPHILS RELATIVE PERCENT: 82 %
NUCLEATED RED BLOOD CELLS: 4 /100 WBC
NUCLEATED RED BLOOD CELLS: 4 /100 WBC
PDW BLD-RTO: 21.5 % (ref 12.4–15.4)
PDW BLD-RTO: 21.7 % (ref 12.4–15.4)
PLATELET # BLD: 275 K/UL (ref 135–450)
PLATELET # BLD: 306 K/UL (ref 135–450)
PLATELET SLIDE REVIEW: ADEQUATE
PLATELET SLIDE REVIEW: ADEQUATE
PMV BLD AUTO: 8.4 FL (ref 5–10.5)
PMV BLD AUTO: 8.7 FL (ref 5–10.5)
POIKILOCYTES: ABNORMAL
POIKILOCYTES: ABNORMAL
POLYCHROMASIA: ABNORMAL
POLYCHROMASIA: ABNORMAL
RBC # BLD: 1.96 M/UL (ref 4.2–5.9)
RBC # BLD: 2.31 M/UL (ref 4.2–5.9)
RETICULOCYTE ABSOLUTE COUNT: 0.34 M/UL
RETICULOCYTE COUNT PCT: 14.05 % (ref 0.5–2.18)
SICKLE CELLS: ABNORMAL
SICKLE CELLS: ABNORMAL
SLIDE REVIEW: ABNORMAL
SLIDE REVIEW: ABNORMAL
TARGET CELLS: ABNORMAL
TARGET CELLS: ABNORMAL
WBC # BLD: 18.8 K/UL (ref 4–11)
WBC # BLD: 20.4 K/UL (ref 4–11)

## 2020-12-02 PROCEDURE — 30233N1 TRANSFUSION OF NONAUTOLOGOUS RED BLOOD CELLS INTO PERIPHERAL VEIN, PERCUTANEOUS APPROACH: ICD-10-PCS | Performed by: INTERNAL MEDICINE

## 2020-12-02 PROCEDURE — 99233 SBSQ HOSP IP/OBS HIGH 50: CPT | Performed by: INTERNAL MEDICINE

## 2020-12-02 PROCEDURE — 36430 TRANSFUSION BLD/BLD COMPNT: CPT

## 2020-12-02 PROCEDURE — 6360000002 HC RX W HCPCS: Performed by: INTERNAL MEDICINE

## 2020-12-02 PROCEDURE — 2580000003 HC RX 258: Performed by: INTERNAL MEDICINE

## 2020-12-02 PROCEDURE — 86902 BLOOD TYPE ANTIGEN DONOR EA: CPT

## 2020-12-02 PROCEDURE — P9016 RBC LEUKOCYTES REDUCED: HCPCS

## 2020-12-02 PROCEDURE — 36415 COLL VENOUS BLD VENIPUNCTURE: CPT

## 2020-12-02 PROCEDURE — 86900 BLOOD TYPING SEROLOGIC ABO: CPT

## 2020-12-02 PROCEDURE — 6370000000 HC RX 637 (ALT 250 FOR IP): Performed by: INTERNAL MEDICINE

## 2020-12-02 PROCEDURE — 85025 COMPLETE CBC W/AUTO DIFF WBC: CPT

## 2020-12-02 PROCEDURE — 86901 BLOOD TYPING SEROLOGIC RH(D): CPT

## 2020-12-02 PROCEDURE — 1200000000 HC SEMI PRIVATE

## 2020-12-02 PROCEDURE — 85660 RBC SICKLE CELL TEST: CPT

## 2020-12-02 PROCEDURE — 94761 N-INVAS EAR/PLS OXIMETRY MLT: CPT

## 2020-12-02 PROCEDURE — 86923 COMPATIBILITY TEST ELECTRIC: CPT

## 2020-12-02 PROCEDURE — 2700000000 HC OXYGEN THERAPY PER DAY

## 2020-12-02 PROCEDURE — 86850 RBC ANTIBODY SCREEN: CPT

## 2020-12-02 RX ORDER — LEVOFLOXACIN 500 MG/1
500 TABLET, FILM COATED ORAL DAILY
Status: DISCONTINUED | OUTPATIENT
Start: 2020-12-02 | End: 2020-12-08

## 2020-12-02 RX ORDER — 0.9 % SODIUM CHLORIDE 0.9 %
250 INTRAVENOUS SOLUTION INTRAVENOUS ONCE
Status: COMPLETED | OUTPATIENT
Start: 2020-12-02 | End: 2020-12-02

## 2020-12-02 RX ADMIN — HYDROMORPHONE HYDROCHLORIDE 0.5 MG: 1 INJECTION, SOLUTION INTRAMUSCULAR; INTRAVENOUS; SUBCUTANEOUS at 22:45

## 2020-12-02 RX ADMIN — OXYCODONE 10 MG: 5 TABLET ORAL at 05:18

## 2020-12-02 RX ADMIN — SODIUM CHLORIDE 250 ML: 9 INJECTION, SOLUTION INTRAVENOUS at 13:25

## 2020-12-02 RX ADMIN — ACETAMINOPHEN 1000 MG: 500 TABLET ORAL at 14:59

## 2020-12-02 RX ADMIN — HYDROMORPHONE HYDROCHLORIDE 0.5 MG: 1 INJECTION, SOLUTION INTRAMUSCULAR; INTRAVENOUS; SUBCUTANEOUS at 15:39

## 2020-12-02 RX ADMIN — KETOROLAC TROMETHAMINE 15 MG: 30 INJECTION, SOLUTION INTRAMUSCULAR; INTRAVENOUS at 05:18

## 2020-12-02 RX ADMIN — DEXTROSE MONOHYDRATE 500 MG: 50 INJECTION, SOLUTION INTRAVENOUS at 11:05

## 2020-12-02 RX ADMIN — LEVOFLOXACIN 500 MG: 500 TABLET, FILM COATED ORAL at 23:49

## 2020-12-02 RX ADMIN — HYDROMORPHONE HYDROCHLORIDE: 1 INJECTION, SOLUTION INTRAMUSCULAR; INTRAVENOUS; SUBCUTANEOUS at 19:57

## 2020-12-02 RX ADMIN — KETOROLAC TROMETHAMINE 15 MG: 30 INJECTION, SOLUTION INTRAMUSCULAR; INTRAVENOUS at 16:46

## 2020-12-02 RX ADMIN — HYDROXYUREA 1000 MG: 500 CAPSULE ORAL at 08:14

## 2020-12-02 RX ADMIN — SODIUM CHLORIDE: 9 INJECTION, SOLUTION INTRAVENOUS at 05:18

## 2020-12-02 RX ADMIN — KETOROLAC TROMETHAMINE 15 MG: 30 INJECTION, SOLUTION INTRAMUSCULAR; INTRAVENOUS at 23:46

## 2020-12-02 RX ADMIN — ACETAMINOPHEN 1000 MG: 500 TABLET ORAL at 22:45

## 2020-12-02 RX ADMIN — HYDROMORPHONE HYDROCHLORIDE 0.5 MG: 1 INJECTION, SOLUTION INTRAMUSCULAR; INTRAVENOUS; SUBCUTANEOUS at 12:13

## 2020-12-02 RX ADMIN — FOLIC ACID 2 MG: 1 TABLET ORAL at 08:14

## 2020-12-02 RX ADMIN — OXYCODONE 10 MG: 5 TABLET ORAL at 16:46

## 2020-12-02 RX ADMIN — OXYCODONE 10 MG: 5 TABLET ORAL at 11:05

## 2020-12-02 RX ADMIN — PANTOPRAZOLE SODIUM 40 MG: 40 TABLET, DELAYED RELEASE ORAL at 05:18

## 2020-12-02 RX ADMIN — KETOROLAC TROMETHAMINE 15 MG: 30 INJECTION, SOLUTION INTRAMUSCULAR; INTRAVENOUS at 11:05

## 2020-12-02 RX ADMIN — HYDROMORPHONE HYDROCHLORIDE 0.5 MG: 1 INJECTION, SOLUTION INTRAMUSCULAR; INTRAVENOUS; SUBCUTANEOUS at 08:14

## 2020-12-02 RX ADMIN — HYDROXYUREA 1000 MG: 500 CAPSULE ORAL at 19:58

## 2020-12-02 RX ADMIN — OXYCODONE 10 MG: 5 TABLET ORAL at 21:54

## 2020-12-02 ASSESSMENT — PAIN SCALES - GENERAL
PAINLEVEL_OUTOF10: 7
PAINLEVEL_OUTOF10: 9
PAINLEVEL_OUTOF10: 8
PAINLEVEL_OUTOF10: 9
PAINLEVEL_OUTOF10: 8
PAINLEVEL_OUTOF10: 5
PAINLEVEL_OUTOF10: 1
PAINLEVEL_OUTOF10: 8
PAINLEVEL_OUTOF10: 6
PAINLEVEL_OUTOF10: 8
PAINLEVEL_OUTOF10: 5
PAINLEVEL_OUTOF10: 5

## 2020-12-02 ASSESSMENT — PAIN DESCRIPTION - LOCATION
LOCATION: ABDOMEN;BACK;CHEST
LOCATION: CHEST;BACK

## 2020-12-02 ASSESSMENT — PAIN DESCRIPTION - PAIN TYPE
TYPE: ACUTE PAIN

## 2020-12-02 ASSESSMENT — PAIN DESCRIPTION - ONSET: ONSET: ON-GOING

## 2020-12-02 ASSESSMENT — PAIN DESCRIPTION - FREQUENCY: FREQUENCY: CONTINUOUS

## 2020-12-02 ASSESSMENT — PAIN DESCRIPTION - PROGRESSION: CLINICAL_PROGRESSION: GRADUALLY IMPROVING

## 2020-12-02 ASSESSMENT — PAIN DESCRIPTION - DESCRIPTORS: DESCRIPTORS: ACHING;SHARP

## 2020-12-02 ASSESSMENT — PAIN DESCRIPTION - ORIENTATION: ORIENTATION: UPPER;LOWER

## 2020-12-02 NOTE — FLOWSHEET NOTE
12/01/20 2258   Vital Signs   Temp 98.1 °F (36.7 °C)   Temp Source Oral   Heart Rate Source Monitor   Resp 20   /60   Patient Position Semi fowlers   Oxygen Therapy   SpO2 93 %   O2 Device Nasal cannula   O2 Flow Rate (L/min) 2 L/min     Pt lethargic and drowsy, closing eyes and drifting during conversation. Charge nurse and clinical aware. Bed alarm on. PM meds given. [place] in place.

## 2020-12-02 NOTE — PROGRESS NOTES
Pt alert at this time. No drowsiness noted. Pt in pain, 9/10. PRN oxycodone given.      /64  Pulse 121  O2 96%, 2 LPM  RR 18

## 2020-12-02 NOTE — PROGRESS NOTES
Saw pt for staff concern of increased drowsiness. Upon entering room, pt sitting upright in bed with eyes closed drifting to maintain his upright position. Opens his eyes and states his pain is tolerable now. He then closes his eyes again. Writer asked him if he has taken any home medications or has any in his backpack in the room so we can secure them - pt denies having any home medications in the room. Education that if he takes additional medications and we medicate him it would be dangerous for his respiratory status. Pt again denies taking any home medications. States his ribs are hurting a little bit now. Writer explained that since he is currently so drowsy, we will not be able to administer additional pain mediations at this time - pt satisfied with his pain control at this time and states he is just tired from the long hospital course of ED to 2W. Pt remains sitting up in bed drowsy, call light in reach.  Janet Farrell Clinical

## 2020-12-02 NOTE — PROGRESS NOTES
BILITOT 5.1* 5.2*   ALKPHOS 99 88     PT/INR: No results for input(s): PROTIME, INR in the last 72 hours. CULTURES  Blood: NGTD  Rapid COVID: not detected    RADIOLOGY  XR CHEST PORTABLE   Final Result   1. No radiographic evidence of lung consolidation. 2. Mild cardiomegaly. Maryuri Chavez have reviewed the chart on  and personally interviewed and examined patient, reviewed the data (labs and imaging) and after discussion with my PA formulated the plan. Agree with note with the following edits. HPI:     53-year-old black male admitted for sickle cell crisis. Last sickle cell crisis was 2 months ago. Receiving IV fluids, IV pain medication and oral pain medication. Complains of some chest tightness. Has a low-grade fever. Does not have much of an appetite. He is already been seen by hematology. I reviewed the patient's Past Medical History, Past Surgical History, Medications, and Allergies. Physical exam:    BP (!) 112/53   Pulse 91   Temp 99.3 °F (37.4 °C) (Oral)   Resp 18   Ht 5' 8\" (1.727 m)   Wt 188 lb (85.3 kg)   SpO2 92%   BMI 28.59 kg/m²     Gen: No distress. Alert. Sick appearing  Eyes: PERRL. No sclera icterus. No conjunctival injection. ENT: No discharge. Pharynx clear. Neck: Trachea midline. Normal thyroid. Resp: No accessory muscle use. No crackles. No wheezes. No rhonchi. No dullness on percussion. CV: Regular rate. Regular rhythm. No murmur or rub. No edema. Assessment/Plan:  Sickle cell crisis  - pain control: Dilaudid, Oxycodone-IR, Toradol as needed  - continue Folic acid, Hydrea  - Hem/Onc consult  - aggressive IVF's, supplemental O2. Anemia  - secondary to sickle cell  - Hgb dropped to 6.8. Transfuse 1 unit PRBC  - monitor CBC. Leukocytosis  - improved from 25.2-->18.8    Acute Hypoxia  - on 2 L O2. Wean as tolerated.      Low grade fever  - Zithromax started D#1    Chest pain, back pain  - CTPA if chest pain persists or if hypoxic.      DVT Prophylaxis: Lovenox  Diet: DIET GENERAL;  Code Status: Full Code      Paloma Martinez FNP-C  12/2/2020       JOSHUA Barrett 12/2/2020 9:43 AM

## 2020-12-02 NOTE — PROGRESS NOTES
End of shift report:  Patient received x1 PRBC today d/t 6.8 hemoglobin this AM. Redraw this evening shows improvement. Patient is responding well to strict pain management - patient stating that he is feeling better when PRN pain medications are given when they are allowed to be given. Patient educated on the importance of opioid management, patient demonstrated understanding.

## 2020-12-02 NOTE — CONSULTS
HEMATOLOGY/ONCOLOGY CONSULTATION:     12/2/2020 8:29 AM    REASON FOR CONSULT: Sickle cell disease    PROVIDERS:  Pat Hernandes MD    CHIEF COMPLAINT:     Chief Complaint   Patient presents with    Sickle Cell Pain Crisis     Pt arrives via EMS for sickle cell crisis that started at 1800 today       HISTORY OF PRESENT ILLNESS:     HPI:  23 AAM with sickle cell anemia who is followed by my partner Dr. Susy De La Paz. The patient presented to the ER with intractable upper back and chest pain. His typical sickle cell pain is in his lower back. He tried to contol his symptoms with oxycodone at home but was unsuccessful. His Hgb is 6.8 today and he runs 9-10 at baseline. He reports no fever or cough at home but had a Tmax 100.5 here.      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)     Sickle cell anemia (HCC)        PAST SURGICAL HISTORY:        Past Surgical History:   Procedure Laterality Date    SPLENECTOMY         SOCIAL HISTORY:     Social History     Socioeconomic History    Marital status: Single     Spouse name: Not on file    Number of children: 0    Years of education: Not on file    Highest education level: Not on file   Occupational History    Not on file   Social Needs    Financial resource strain: Not on file    Food insecurity     Worry: Not on file     Inability: Not on file   [employer] needs     Medical: Not on file     Non-medical: Not on file   Tobacco Use    Smoking status: Never Smoker    Smokeless tobacco: Never Used   Substance and Sexual Activity    Alcohol use: Not Currently    Drug use: Never    Sexual activity: Not Currently   Lifestyle    Physical activity     Days per week: Not on file     Minutes per session: Not on file    Stress: Not on file   Relationships    Social connections     Talks on phone: Not on file     Gets together: Not on file     Attends Mu-ism service: Not on file     Active member of club or organization: Not on file     Attends meetings of clubs or organizations: Not on file     Relationship status: Not on file    Intimate partner violence     Fear of current or ex partner: Not on file     Emotionally abused: Not on file     Physically abused: Not on file     Forced sexual activity: Not on file   Other Topics Concern    Not on file   Social History Narrative    Not on file       FAMILY HISTORY:     History reviewed. No pertinent family history. ALLERGIES:     Allergies as of 12/01/2020 - Review Complete 12/01/2020   Allergen Reaction Noted    Morphine Shortness Of Breath 04/05/2013       MEDICATIONS:     No current facility-administered medications on file prior to encounter. Current Outpatient Medications on File Prior to Encounter   Medication Sig Dispense Refill    hydroxyurea (HYDREA) 500 MG chemo capsule Take 4 capsules by mouth daily Unsure of dose 120 capsule 0    folic acid (FOLVITE) 1 MG tablet Take 2 tablets by mouth daily 30 tablet 0    vitamin B-12 1000 MCG tablet Take 1 tablet by mouth daily 30 tablet 3    methadone (DOLOPHINE) 5 MG tablet Take 5 mg by mouth every 4 hours as needed for Pain. REVIEW OF SYSTEMS:       10 point ROS completed. Pertinent positives in HPI, otherwise negative.      PHYSICAL EXAM:       Vitals:    12/02/20 0800   BP: (!) 112/53   Pulse: 91   Resp:    Temp:    SpO2:        General appearance: alert and cooperative  Head: Normocephalic, without obvious abnormality, atraumatic  Neck: No palpable lymphadenopathy in supraclavicular or cervical chains  Lungs: Clear to auscultation bilaterally, no audible rales, wheezes or crackles  Heart: Regular rate and rhythm, S1, S2 normal  Abdomen: Soft, non-tender; bowel sounds normal; no masses,  no organomegaly  Extremities: without cyanosis, clubbing, edema or asymmetry  Skin: No jaundice, purpura or petechiae      LABS:     Lab

## 2020-12-02 NOTE — PROGRESS NOTES
Consult has been called to Dr. Vladimir Nelson on 12/2/20. Spoke with dr cintron.  8:40 AM    Nigel Covarrubias  12/2/2020

## 2020-12-03 ENCOUNTER — APPOINTMENT (OUTPATIENT)
Dept: CT IMAGING | Age: 20
DRG: 811 | End: 2020-12-03
Payer: COMMERCIAL

## 2020-12-03 ENCOUNTER — APPOINTMENT (OUTPATIENT)
Dept: GENERAL RADIOLOGY | Age: 20
DRG: 811 | End: 2020-12-03
Payer: COMMERCIAL

## 2020-12-03 LAB
ANISOCYTOSIS: ABNORMAL
BANDED NEUTROPHILS RELATIVE PERCENT: 1 % (ref 0–7)
BASOPHILS ABSOLUTE: 0 K/UL (ref 0–0.2)
BASOPHILS RELATIVE PERCENT: 0 %
EOSINOPHILS ABSOLUTE: 0 K/UL (ref 0–0.6)
EOSINOPHILS RELATIVE PERCENT: 0 %
HAPTOGLOBIN: <10 MG/DL (ref 30–200)
HCT VFR BLD CALC: 23.1 % (ref 40.5–52.5)
HCT VFR BLD CALC: 23.6 % (ref 40.5–52.5)
HEMATOLOGY PATH CONSULT: NO
HEMOGLOBIN: 7.8 G/DL (ref 13.5–17.5)
IMMATURE RETIC FRACT: 0.6 (ref 0.21–0.37)
LACTATE DEHYDROGENASE: 720 U/L (ref 100–190)
LYMPHOCYTES ABSOLUTE: 1 K/UL (ref 1–5.1)
LYMPHOCYTES RELATIVE PERCENT: 4 %
MCH RBC QN AUTO: 33.3 PG (ref 26–34)
MCHC RBC AUTO-ENTMCNC: 33.1 G/DL (ref 31–36)
MCV RBC AUTO: 100.5 FL (ref 80–100)
MONOCYTES ABSOLUTE: 1.8 K/UL (ref 0–1.3)
MONOCYTES RELATIVE PERCENT: 7 %
NEUTROPHILS ABSOLUTE: 22.7 K/UL (ref 1.7–7.7)
NEUTROPHILS RELATIVE PERCENT: 88 %
NUCLEATED RED BLOOD CELLS: 2 /100 WBC
PDW BLD-RTO: 20.8 % (ref 12.4–15.4)
PLATELET # BLD: 266 K/UL (ref 135–450)
PLATELET SLIDE REVIEW: ADEQUATE
PMV BLD AUTO: 8.9 FL (ref 5–10.5)
POIKILOCYTES: ABNORMAL
POLYCHROMASIA: ABNORMAL
PROCALCITONIN: 0.22 NG/ML (ref 0–0.15)
RBC # BLD: 2.34 M/UL (ref 4.2–5.9)
RETICULOCYTE ABSOLUTE COUNT: 0.24 M/UL
RETICULOCYTE COUNT PCT: 10.15 % (ref 0.5–2.18)
SICKLE CELLS: ABNORMAL
SLIDE REVIEW: ABNORMAL
WBC # BLD: 25.5 K/UL (ref 4–11)

## 2020-12-03 PROCEDURE — 36415 COLL VENOUS BLD VENIPUNCTURE: CPT

## 2020-12-03 PROCEDURE — 2580000003 HC RX 258: Performed by: INTERNAL MEDICINE

## 2020-12-03 PROCEDURE — 6360000002 HC RX W HCPCS: Performed by: INTERNAL MEDICINE

## 2020-12-03 PROCEDURE — 83615 LACTATE (LD) (LDH) ENZYME: CPT

## 2020-12-03 PROCEDURE — 85045 AUTOMATED RETICULOCYTE COUNT: CPT

## 2020-12-03 PROCEDURE — 71260 CT THORAX DX C+: CPT

## 2020-12-03 PROCEDURE — 6360000004 HC RX CONTRAST MEDICATION: Performed by: INTERNAL MEDICINE

## 2020-12-03 PROCEDURE — 6370000000 HC RX 637 (ALT 250 FOR IP): Performed by: INTERNAL MEDICINE

## 2020-12-03 PROCEDURE — 85025 COMPLETE CBC W/AUTO DIFF WBC: CPT

## 2020-12-03 PROCEDURE — 1200000000 HC SEMI PRIVATE

## 2020-12-03 PROCEDURE — 71045 X-RAY EXAM CHEST 1 VIEW: CPT

## 2020-12-03 PROCEDURE — 99233 SBSQ HOSP IP/OBS HIGH 50: CPT | Performed by: INTERNAL MEDICINE

## 2020-12-03 PROCEDURE — 84145 PROCALCITONIN (PCT): CPT

## 2020-12-03 PROCEDURE — 83010 ASSAY OF HAPTOGLOBIN QUANT: CPT

## 2020-12-03 RX ADMIN — HYDROMORPHONE HYDROCHLORIDE 1 MG: 1 INJECTION, SOLUTION INTRAMUSCULAR; INTRAVENOUS; SUBCUTANEOUS at 10:32

## 2020-12-03 RX ADMIN — PANTOPRAZOLE SODIUM 40 MG: 40 TABLET, DELAYED RELEASE ORAL at 05:33

## 2020-12-03 RX ADMIN — SODIUM CHLORIDE: 9 INJECTION, SOLUTION INTRAVENOUS at 20:16

## 2020-12-03 RX ADMIN — HYDROMORPHONE HYDROCHLORIDE 1 MG: 1 INJECTION, SOLUTION INTRAMUSCULAR; INTRAVENOUS; SUBCUTANEOUS at 20:15

## 2020-12-03 RX ADMIN — HYDROMORPHONE HYDROCHLORIDE 1 MG: 1 INJECTION, SOLUTION INTRAMUSCULAR; INTRAVENOUS; SUBCUTANEOUS at 23:22

## 2020-12-03 RX ADMIN — HYDROXYUREA 1000 MG: 500 CAPSULE ORAL at 20:18

## 2020-12-03 RX ADMIN — OXYCODONE 10 MG: 5 TABLET ORAL at 05:33

## 2020-12-03 RX ADMIN — ACETAMINOPHEN 1000 MG: 500 TABLET ORAL at 16:36

## 2020-12-03 RX ADMIN — HYDROMORPHONE HYDROCHLORIDE 1 MG: 1 INJECTION, SOLUTION INTRAMUSCULAR; INTRAVENOUS; SUBCUTANEOUS at 07:17

## 2020-12-03 RX ADMIN — IOPAMIDOL 85 ML: 755 INJECTION, SOLUTION INTRAVENOUS at 11:19

## 2020-12-03 RX ADMIN — HYDROMORPHONE HYDROCHLORIDE 1 MG: 1 INJECTION, SOLUTION INTRAMUSCULAR; INTRAVENOUS; SUBCUTANEOUS at 04:16

## 2020-12-03 RX ADMIN — LEVOFLOXACIN 500 MG: 500 TABLET, FILM COATED ORAL at 07:17

## 2020-12-03 RX ADMIN — OXYCODONE 10 MG: 5 TABLET ORAL at 14:38

## 2020-12-03 RX ADMIN — DEXTROSE MONOHYDRATE 500 MG: 50 INJECTION, SOLUTION INTRAVENOUS at 07:16

## 2020-12-03 RX ADMIN — Medication 10 ML: at 10:37

## 2020-12-03 RX ADMIN — FOLIC ACID 2 MG: 1 TABLET ORAL at 07:17

## 2020-12-03 RX ADMIN — HYDROMORPHONE HYDROCHLORIDE 1 MG: 1 INJECTION, SOLUTION INTRAMUSCULAR; INTRAVENOUS; SUBCUTANEOUS at 13:36

## 2020-12-03 RX ADMIN — HYDROMORPHONE HYDROCHLORIDE 1 MG: 1 INJECTION, SOLUTION INTRAMUSCULAR; INTRAVENOUS; SUBCUTANEOUS at 16:36

## 2020-12-03 RX ADMIN — OXYCODONE 10 MG: 5 TABLET ORAL at 09:28

## 2020-12-03 RX ADMIN — HYDROXYUREA 1000 MG: 500 CAPSULE ORAL at 09:31

## 2020-12-03 ASSESSMENT — PAIN SCALES - GENERAL
PAINLEVEL_OUTOF10: 6
PAINLEVEL_OUTOF10: 6
PAINLEVEL_OUTOF10: 8
PAINLEVEL_OUTOF10: 7
PAINLEVEL_OUTOF10: 6
PAINLEVEL_OUTOF10: 6
PAINLEVEL_OUTOF10: 7
PAINLEVEL_OUTOF10: 7
PAINLEVEL_OUTOF10: 9
PAINLEVEL_OUTOF10: 5
PAINLEVEL_OUTOF10: 0
PAINLEVEL_OUTOF10: 7
PAINLEVEL_OUTOF10: 6
PAINLEVEL_OUTOF10: 3

## 2020-12-03 NOTE — PROGRESS NOTES
Progress Note    Admit Date:  12/1/2020    23 y.o. male who presented to the hospital with a chief complaint of uncontrolled pain. The patient has a history of sickle cell anemia and is on chronic narcotic therapy for pain as needed. Subjective:  Mr. Klever Burns states he feels about the same. Continued chest pain. He is on 2 L O2. His Hgb is 7.8 after 1 unit PRBC yesterday. Seen by hematologist this morning. CT chest ordered. Temp 102 last night. He is tachycardic    Objective:   Patient Vitals for the past 4 hrs:   BP Temp Temp src Pulse Resp SpO2   12/03/20 0718 136/67 100.2 °F (37.9 °C) Temporal 102 21 98 %            Intake/Output Summary (Last 24 hours) at 12/3/2020 0835  Last data filed at 12/3/2020 4417  Gross per 24 hour   Intake 1236.25 ml   Output 4875 ml   Net -3638.75 ml       Physical Exam:  Gen: in distress due to pain. Alert. Eyes: PERRL. No sclera icterus. No conjunctival injection. ENT: No discharge. Pharynx clear. Neck: Trachea midline. Resp: No accessory muscle use. No crackles. No wheezes. No rhonchi. CV: Tachycardic rate. Regular rhythm. No murmur. No rub. No edema. Capillary Refill: Brisk,< 3 seconds   Peripheral Pulses: +2 palpable, equal bilaterally   GI: Non-tender. Non-distended. Normal bowel sounds. No hernia. Skin: Warm and dry. No nodule on exposed extremities. No rash on exposed extremities. M/S: No cyanosis. No joint deformity. No clubbing. Neuro: Awake. Grossly nonfocal    Psych: Oriented x 3.  No anxiety or agitation      Data:  CBC:   Recent Labs     12/02/20  0553 12/02/20  1724 12/03/20  0528   WBC 18.8* 20.4* 25.5*   HGB 6.8* 7.8* 7.8*   HCT 19.3* 22.1* 23.6*   MCV 98.2 95.5 100.5*    306 266     BMP:   Recent Labs     12/01/20  0107 12/01/20  0607    136   K 4.0 4.4    104   CO2 23 22   BUN 8 9   CREATININE 0.8* 0.9     LIVER PROFILE:   Recent Labs     12/01/20  0107 12/01/20  0607   AST 29 36   ALT 17 14   BILITOT 5.1* 5.2*   ALKPHOS 99 88     PT/INR: No results for input(s): PROTIME, INR in the last 72 hours. CULTURES  Blood: NGTD  Rapid COVID: not detected    RADIOLOGY  CT CHEST PULMONARY EMBOLISM W CONTRAST   Final Result   No large central filling defect in the pulmonary arteries however evaluation   of lobar and subsegmental pulmonary arteries is nondiagnostic due to poor   contrast bolus. Parenchymal changes as above concerning for infection. XR CHEST PORTABLE   Final Result   New mild left basilar airspace disease which may be related to atelectasis   versus pneumonia. XR CHEST PORTABLE   Final Result   1. No radiographic evidence of lung consolidation. 2. Mild cardiomegaly. Megan Primrose have reviewed the chart on  and personally interviewed and examined patient, reviewed the data (labs and imaging) and after discussion with my PA formulated the plan. Agree with note with the following edits. HPI:     57-year-old black male admitted for sickle cell crisis. Last sickle cell crisis was 2 months ago. Receiving IV fluids, IV pain medication and oral pain medication. Complains of some chest tightness. Has a low-grade fever. Does not have much of an appetite. He is already been seen by hematology. 12/3- feels about the same. CTPA neg for PE. Showed ?pneumonia. He did spike a fever. I reviewed the patient's Past Medical History, Past Surgical History, Medications, and Allergies. Physical exam:    /67   Pulse 102   Temp 100.2 °F (37.9 °C) (Temporal)   Resp 21   Ht 5' 8\" (1.727 m)   Wt 188 lb (85.3 kg)   SpO2 98%   BMI 28.59 kg/m²     Gen: No distress. Alert. Eyes: PERRL. No sclera icterus. No conjunctival injection. ENT: No discharge. Pharynx clear. Neck: Trachea midline. Normal thyroid. Resp: No accessory muscle use. No crackles. No wheezes. No rhonchi. No dullness on percussion. CV: Regular rate. Regular rhythm. No murmur or rub.  No edema. Assessment/Plan:  Sickle cell crisis  - pain control: Dilaudid, Oxycodone-IR, Toradol as needed  - continue Folic acid, Hydrea  - Hem/Onc consult  - aggressive IVF's, supplemental O2. Anemia  - secondary to sickle cell  - Hgb dropped to 6.8. Transfuse 1 unit PRBC  - monitor CBC. Up to 7.8 today. Leukocytosis  - back up to 25.5 today. CT chest ordered. Showed ?pneumonia. No PE. On Levaquin. Stop Zithromax. Acute Hypoxia  - on 2 L O2. Wean as tolerated. Fever  - check CT chest  - Zithromax started D#2. Change to PO Levaquin. Chest pain, back pain  - CTPA today.     DVT Prophylaxis: Lovenox  Diet: DIET GENERAL;  Code Status: Full Code    Courtney KUNZP-JES  12/3/2020       JOSHUA Harvey 12/3/2020 12:26 PM

## 2020-12-03 NOTE — PROGRESS NOTES
Reporting that he is sob; reapplied O2 at 2 L. 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 Mom is on the phone and states she is a retired RN. Pain medication given along with tylenol as temp has spiked to 102. [id number]% on room air; hr 110. Spo2 now 98%; placed on continuous pulse ox. respirs are easy and even. Will continue to closely monitor. Call light in reach.

## 2020-12-03 NOTE — PROGRESS NOTES
Called RN in charge of patient care; informed RN that patient must have additional IV access, currently patient has iv in the hand which is insufficient for CTPA exam; RN to call CT when ready

## 2020-12-03 NOTE — PROGRESS NOTES
Upon assessment patient denies pain in left [facility] where prior IV was placed by bedside RN. Patient states that he has no needs at this time. Patient further states that his pain medication is due at 1:30. Writer educated patient that pain medication must have an hour in between doses.

## 2020-12-03 NOTE — PROGRESS NOTES
Sleeping, hob up, O2 on at 3 L pnc. ; toradol 15 mg IVP given as ordered. Will continue to closely monitor. Call light in reach.

## 2020-12-03 NOTE — PROGRESS NOTES
Pain medication given for pain 8/10. Hob up, oxygen on; call light in reach. Will continue to monitor.

## 2020-12-03 NOTE — PROGRESS NOTES
ONCOLOGY FOLLOW-UP:       Primary Oncologist:  Dr. Monsivais Rain:       Patient Active Problem List   Diagnosis Code    Sickle cell pain crisis ([facility] D57.00    Asthma J45.909    S[facility] ([facility] D57.00    Sepsis ([facility] A41.9    Opiate overdose ([facility] T40.601A    Opiate antagonist poisoning, accidental or unintentional, initial encounter T50.7X1A    Leukocytosis D72.829    Hypoxia R09.02    Anemia D64.9    Other chest pain R07.89       INTERVAL HISTORY:       Pt remains admitted. Transfused PRBC yesterday. Has had low grade fevers intermittently over past 24 hrs. He states his pain seems better today but he feels like he cannot take a deep breath. His 02 sats are 100% on nasal cannula. REVIEW OF SYSTEMS:       10 point ROS completed. Pertinent positives in HPI, otherwise negative.      PHYSICAL EXAM:       Vitals:    12/03/20 0718   BP: 136/67   Pulse: 102   Resp: 21   Temp: 100.2 °F (37.9 °C)   SpO2: 98%       General appearance: alert and cooperative  Head: Normocephalic, without obvious abnormality, atraumatic  Neck: No palpable lymphadenopathy in supraclavicular or cervical chains  Lungs: Clear to auscultation bilaterally, no audible rales, wheezes or crackles  Heart: Regular rate and rhythm, S1, S2 normal  Abdomen: Soft, non-tender; bowel sounds normal; no masses,  no organomegaly  Extremities: without cyanosis, clubbing, edema or asymmetry  Skin: No jaundice, purpura or petechiae      LABS:     Lab Results   Component Value Date    WBC 25.5 (H) 12/03/2020    HGB 7.8 (L) 12/03/2020    HCT 23.6 (L) 12/03/2020    .5 (H) 12/03/2020     12/03/2020       Lab Results   Component Value Date    GLUCOSE 98 12/01/2020    BUN 9 12/01/2020    CREATININE 0.9 12/01/2020    K 4.4 12/01/2020       Lab Results   Component Value Date    ALKPHOS 88 12/01/2020    ALT 14 12/01/2020    AST 36 12/01/2020    BILITOT 5.2 (H) 12/01/2020    BILIDIR 0.7 (H) 09/27/2020    PROT 7.3 12/01/2020 IMAGING:     Xr Chest Portable  Result Date: 12/1/2020  1. No radiographic evidence of lung consolidation. 2. Mild cardiomegaly. ASSESSMENT:     Problem List Items Addressed This Visit     Sickle cell pain crisis ([address] - Primary                PLAN:     1.  Sickle Cell Pain Crisis  - pain in chest and upper back different than before   - low grade fever x 1  - continue IVF and IV analgesics for acute crisis - dilaudid and oxycodone prn  - continue IV  toradol  - continue hydrea/folate  - transfused 1 unit PRBC with Hgb < 7 12/2 - Hgb 7.8 today  - day 2 azithromycin for low grade fevers - blood cx pending, CXR negative, COVID negative  - monitor daily CBC and hemolytic parameters  - will get CTPA with his SOB and chest pain    Pita Galindo MD

## 2020-12-04 LAB
BASOPHILS ABSOLUTE: 0.1 K/UL (ref 0–0.2)
BASOPHILS RELATIVE PERCENT: 0.3 %
EKG ATRIAL RATE: 99 BPM
EKG DIAGNOSIS: NORMAL
EKG P AXIS: 59 DEGREES
EKG P-R INTERVAL: 142 MS
EKG Q-T INTERVAL: 330 MS
EKG QRS DURATION: 96 MS
EKG QTC CALCULATION (BAZETT): 423 MS
EKG R AXIS: 79 DEGREES
EKG T AXIS: -40 DEGREES
EKG VENTRICULAR RATE: 99 BPM
EOSINOPHILS ABSOLUTE: 0.1 K/UL (ref 0–0.6)
EOSINOPHILS RELATIVE PERCENT: 0.4 %
HAPTOGLOBIN: 18 MG/DL (ref 30–200)
HCT VFR BLD CALC: 18.1 % (ref 40.5–52.5)
HEMOGLOBIN: 6.1 G/DL (ref 13.5–17.5)
LACTATE DEHYDROGENASE: 548 U/L (ref 100–190)
LYMPHOCYTES ABSOLUTE: 1.5 K/UL (ref 1–5.1)
LYMPHOCYTES RELATIVE PERCENT: 6.1 %
MCH RBC QN AUTO: 32.9 PG (ref 26–34)
MCHC RBC AUTO-ENTMCNC: 33.6 G/DL (ref 31–36)
MCV RBC AUTO: 97.7 FL (ref 80–100)
MONOCYTES ABSOLUTE: 2.3 K/UL (ref 0–1.3)
MONOCYTES RELATIVE PERCENT: 9.7 %
NEUTROPHILS ABSOLUTE: 19.9 K/UL (ref 1.7–7.7)
NEUTROPHILS RELATIVE PERCENT: 83.5 %
PDW BLD-RTO: 18.3 % (ref 12.4–15.4)
PLATELET # BLD: 322 K/UL (ref 135–450)
PMV BLD AUTO: 8.8 FL (ref 5–10.5)
RBC # BLD: 1.85 M/UL (ref 4.2–5.9)
WBC # BLD: 23.8 K/UL (ref 4–11)

## 2020-12-04 PROCEDURE — 6360000002 HC RX W HCPCS: Performed by: INTERNAL MEDICINE

## 2020-12-04 PROCEDURE — 83615 LACTATE (LD) (LDH) ENZYME: CPT

## 2020-12-04 PROCEDURE — 83010 ASSAY OF HAPTOGLOBIN QUANT: CPT

## 2020-12-04 PROCEDURE — 85045 AUTOMATED RETICULOCYTE COUNT: CPT

## 2020-12-04 PROCEDURE — 2700000000 HC OXYGEN THERAPY PER DAY

## 2020-12-04 PROCEDURE — 94761 N-INVAS EAR/PLS OXIMETRY MLT: CPT

## 2020-12-04 PROCEDURE — 93010 ELECTROCARDIOGRAM REPORT: CPT | Performed by: INTERNAL MEDICINE

## 2020-12-04 PROCEDURE — 2580000003 HC RX 258: Performed by: INTERNAL MEDICINE

## 2020-12-04 PROCEDURE — 36415 COLL VENOUS BLD VENIPUNCTURE: CPT

## 2020-12-04 PROCEDURE — P9016 RBC LEUKOCYTES REDUCED: HCPCS

## 2020-12-04 PROCEDURE — 6370000000 HC RX 637 (ALT 250 FOR IP): Performed by: INTERNAL MEDICINE

## 2020-12-04 PROCEDURE — 85025 COMPLETE CBC W/AUTO DIFF WBC: CPT

## 2020-12-04 PROCEDURE — 36430 TRANSFUSION BLD/BLD COMPNT: CPT

## 2020-12-04 PROCEDURE — 94640 AIRWAY INHALATION TREATMENT: CPT

## 2020-12-04 PROCEDURE — 93005 ELECTROCARDIOGRAM TRACING: CPT

## 2020-12-04 PROCEDURE — 99232 SBSQ HOSP IP/OBS MODERATE 35: CPT | Performed by: INTERNAL MEDICINE

## 2020-12-04 PROCEDURE — 1200000000 HC SEMI PRIVATE

## 2020-12-04 RX ORDER — IPRATROPIUM BROMIDE AND ALBUTEROL SULFATE 2.5; .5 MG/3ML; MG/3ML
1 SOLUTION RESPIRATORY (INHALATION)
Status: DISCONTINUED | OUTPATIENT
Start: 2020-12-04 | End: 2020-12-04

## 2020-12-04 RX ORDER — LEVALBUTEROL 1.25 MG/.5ML
0.63 SOLUTION, CONCENTRATE RESPIRATORY (INHALATION) 3 TIMES DAILY
Status: DISCONTINUED | OUTPATIENT
Start: 2020-12-04 | End: 2020-12-05

## 2020-12-04 RX ORDER — LEVALBUTEROL 1.25 MG/.5ML
0.63 SOLUTION, CONCENTRATE RESPIRATORY (INHALATION) EVERY 6 HOURS PRN
Status: DISCONTINUED | OUTPATIENT
Start: 2020-12-04 | End: 2020-12-05

## 2020-12-04 RX ORDER — SODIUM CHLORIDE 9 MG/ML
INJECTION, SOLUTION INTRAVENOUS
Status: DISPENSED
Start: 2020-12-04 | End: 2020-12-05

## 2020-12-04 RX ORDER — LEVALBUTEROL 1.25 MG/.5ML
0.63 SOLUTION, CONCENTRATE RESPIRATORY (INHALATION) EVERY 6 HOURS PRN
Status: DISCONTINUED | OUTPATIENT
Start: 2020-12-04 | End: 2020-12-04

## 2020-12-04 RX ORDER — 0.9 % SODIUM CHLORIDE 0.9 %
250 INTRAVENOUS SOLUTION INTRAVENOUS ONCE
Status: COMPLETED | OUTPATIENT
Start: 2020-12-04 | End: 2020-12-04

## 2020-12-04 RX ADMIN — SODIUM CHLORIDE 250 ML: 9 INJECTION, SOLUTION INTRAVENOUS at 14:46

## 2020-12-04 RX ADMIN — HYDROMORPHONE HYDROCHLORIDE 1 MG: 1 INJECTION, SOLUTION INTRAMUSCULAR; INTRAVENOUS; SUBCUTANEOUS at 20:15

## 2020-12-04 RX ADMIN — HYDROXYUREA 1000 MG: 500 CAPSULE ORAL at 09:57

## 2020-12-04 RX ADMIN — OXYCODONE 10 MG: 5 TABLET ORAL at 14:47

## 2020-12-04 RX ADMIN — LEVALBUTEROL 0.63 MG: 1.25 SOLUTION, CONCENTRATE RESPIRATORY (INHALATION) at 15:33

## 2020-12-04 RX ADMIN — ACETAMINOPHEN 1000 MG: 500 TABLET ORAL at 23:36

## 2020-12-04 RX ADMIN — HYDROMORPHONE HYDROCHLORIDE 1 MG: 1 INJECTION, SOLUTION INTRAMUSCULAR; INTRAVENOUS; SUBCUTANEOUS at 23:25

## 2020-12-04 RX ADMIN — HYDROMORPHONE HYDROCHLORIDE 1 MG: 1 INJECTION, SOLUTION INTRAMUSCULAR; INTRAVENOUS; SUBCUTANEOUS at 16:32

## 2020-12-04 RX ADMIN — LEVOFLOXACIN 500 MG: 500 TABLET, FILM COATED ORAL at 09:02

## 2020-12-04 RX ADMIN — HYDROMORPHONE HYDROCHLORIDE 1 MG: 1 INJECTION, SOLUTION INTRAMUSCULAR; INTRAVENOUS; SUBCUTANEOUS at 05:45

## 2020-12-04 RX ADMIN — LEVALBUTEROL 0.63 MG: 1.25 SOLUTION, CONCENTRATE RESPIRATORY (INHALATION) at 20:50

## 2020-12-04 RX ADMIN — HYDROMORPHONE HYDROCHLORIDE 1 MG: 1 INJECTION, SOLUTION INTRAMUSCULAR; INTRAVENOUS; SUBCUTANEOUS at 09:10

## 2020-12-04 RX ADMIN — HYDROXYUREA 1000 MG: 500 CAPSULE ORAL at 20:15

## 2020-12-04 RX ADMIN — HYDROMORPHONE HYDROCHLORIDE 1 MG: 1 INJECTION, SOLUTION INTRAMUSCULAR; INTRAVENOUS; SUBCUTANEOUS at 02:29

## 2020-12-04 RX ADMIN — SODIUM CHLORIDE: 9 INJECTION, SOLUTION INTRAVENOUS at 12:17

## 2020-12-04 RX ADMIN — HYDROMORPHONE HYDROCHLORIDE 1 MG: 1 INJECTION, SOLUTION INTRAMUSCULAR; INTRAVENOUS; SUBCUTANEOUS at 12:18

## 2020-12-04 RX ADMIN — FOLIC ACID 2 MG: 1 TABLET ORAL at 09:00

## 2020-12-04 RX ADMIN — OXYCODONE 10 MG: 5 TABLET ORAL at 04:39

## 2020-12-04 RX ADMIN — SODIUM CHLORIDE: 9 INJECTION, SOLUTION INTRAVENOUS at 02:29

## 2020-12-04 RX ADMIN — ACETAMINOPHEN 1000 MG: 500 TABLET ORAL at 09:01

## 2020-12-04 ASSESSMENT — PAIN SCALES - GENERAL
PAINLEVEL_OUTOF10: 7
PAINLEVEL_OUTOF10: 7
PAINLEVEL_OUTOF10: 4
PAINLEVEL_OUTOF10: 3
PAINLEVEL_OUTOF10: 7
PAINLEVEL_OUTOF10: 6
PAINLEVEL_OUTOF10: 6
PAINLEVEL_OUTOF10: 1
PAINLEVEL_OUTOF10: 0
PAINLEVEL_OUTOF10: 7
PAINLEVEL_OUTOF10: 8
PAINLEVEL_OUTOF10: 7
PAINLEVEL_OUTOF10: 7

## 2020-12-04 NOTE — FLOWSHEET NOTE
12/03/20 2002   Vital Signs   Temp 98.5 °F (36.9 °C)   Temp Source Oral   Pulse 112   Heart Rate Source Monitor   Resp 18   /67   BP Location Left upper arm   Patient Position Semi fowlers   Level of Consciousness Alert (0)   MEWS Score 3   Oxygen Therapy   SpO2 96 %   O2 Device Nasal cannula   O2 Flow Rate (L/min) 2 L/min   Pt c/o pain \"all over\". Dilaudid 1 mg IV given. Cont pulse ox in room, 96% on 2LNC.  Ben Clonts

## 2020-12-04 NOTE — PROGRESS NOTES
Patient is stating that he is having chest pains that are sharp and aching. Patient is stating that it does radiate - unable to state where and how the radiating pain feels.

## 2020-12-04 NOTE — FLOWSHEET NOTE
12/04/20 0327   Vital Signs   Temp 98 °F (36.7 °C)   Temp Source Oral   Pulse 64   Heart Rate Source Monitor   Resp 18   /71   BP Location Left upper arm   Patient Position Semi fowlers   Level of Consciousness Alert (0)   MEWS Score 1   Oxygen Therapy   SpO2 97 %   O2 Device Nasal cannula   O2 Flow Rate (L/min) 2 L/min   Pt resting in bed, eyes closed. resp easy/even.  Minus Bety

## 2020-12-04 NOTE — PROGRESS NOTES
Physician Progress Note      PATIENT:               Steven Loaiza  CSN #:                  [id number]  :                       2000  ADMIT DATE:       2020 12:38 AM  DISCH DATE:  RESPONDING  PROVIDER #:        Juan Antonio Hamilton MD          QUERY TEXT:    Pt admitted with SS crisis. Pt noted to have pneumonia on 12/3. Patient with   chest pain, hypoxia, and leukocytosis on admission. If possible, please   document in the progress notes and discharge summary if you are evaluating   and/or treating any of the following and if POA:    The medical record reflects the following:  Risk Factors: SS crisis  Clinical Indicators: hypoxia, chest/back pain, wbc on admit-25.2  Treatment: Zithromax and Levaquin po, chest and CTPA    Thank you for your assistance,  Lynn Alexis RN,BSN,CCDS,CRCR  Options provided:  -- Gram negative pneumonia present on admission  -- Gram negative pneumonia not present on admission  -- Gram positive pneumonia present on admission  -- Gram positive pneumonia not present on admission  -- Bacterial pneumonia present on admission  -- Bacterial pneumonia not present on admission  -- Other - I will add my own diagnosis  -- Disagree - Not applicable / Not valid  -- Disagree - Clinically unable to determine / Unknown  -- Refer to Clinical Documentation Reviewer    PROVIDER RESPONSE TEXT:    This patient has gram positive pneumonia present on admission. Query created by: Migue Tran on 12/3/2020 1:42 PM      QUERY TEXT:    Dear Attending Provider,  Pt admitted with SS crisis and noted to have pneumonia on 12/3. Pt noted to   have leukocytosis, temp of up to 102, tachycardia, PCT-0.20. If possible,   please document in the progress notes and discharge summary if you are   evaluating and /or treating any of the following:     The medical record reflects the following:  Risk Factors: SS crisis  Clinical Indicators: wbc-25.2--18.8--20.4---25.5, Pct-0.20, temp up to 102 on   , tachycardia, pneumonia noted on 12/3, hypoxia  Treatment: Zithromax, Levaquin po, CTPA, IVF NS bolus, continuous IVF, Tylenol   1000mg    Thank you for your assistance,  Lynn Alexis RN,BSN,CCDS,CRCR  Options provided:  -- Sepsis, present on admission  -- Sepsis, not present on admission,  -- No Sepsis, patient with pneumonia  -- Sepsis was ruled out  -- Other - I will add my own diagnosis  -- Disagree - Not applicable / Not valid  -- Disagree - Clinically unable to determine / Unknown  -- Refer to Clinical Documentation Reviewer    PROVIDER RESPONSE TEXT:    This patient has pneumonia only, patient is not septic.     Query created by: Sukhdev Washington on 12/3/2020 1:51 PM      Electronically signed by:  Rocky Swift MD 12/4/2020 10:57 AM

## 2020-12-04 NOTE — PROGRESS NOTES
Pt refused po pain med. Requested dilaudid \"because the pills don't help. \" Dilaudid 1 mg IV given.  Sherman Vuong

## 2020-12-04 NOTE — PROGRESS NOTES
Progress Note    Admit Date:  12/1/2020    23 y.o. male who presented to the hospital with a chief complaint of uncontrolled pain. The patient has a history of sickle cell anemia and is on chronic narcotic therapy for pain as needed. Subjective:  Mr. Catie Muhammad states he feels about the same. Continued chest pain. He is on 2 L O2. His Hgb is 7.8 after 1 unit PRBC yesterday. Seen by hematologist this morning. CT chest ordered. Temp 102 last night. He is tachycardic    12/4- doing better. Fever still present. Breathing better. Objective:   Patient Vitals for the past 4 hrs:   BP Temp Temp src Pulse Resp SpO2   12/04/20 0953 -- -- -- 108 -- --   12/04/20 0822 129/61 101.7 °F (38.7 °C) Oral 109 17 100 %            Intake/Output Summary (Last 24 hours) at 12/4/2020 1057  Last data filed at 12/4/2020 0555  Gross per 24 hour   Intake 2410 ml   Output 1800 ml   Net 610 ml       Physical Exam:  Gen: in distress due to pain. Alert. Eyes: PERRL. No sclera icterus. No conjunctival injection. ENT: No discharge. Pharynx clear. Neck: Trachea midline. Resp: No accessory muscle use. No crackles. No wheezes. No rhonchi. CV: Tachycardic rate. Regular rhythm. No murmur. No rub. No edema. Capillary Refill: Brisk,< 3 seconds   Peripheral Pulses: +2 palpable, equal bilaterally   GI: Non-tender. Non-distended. Normal bowel sounds. No hernia. Skin: Warm and dry. No nodule on exposed extremities. No rash on exposed extremities. M/S: No cyanosis. No joint deformity. No clubbing. Neuro: Awake. Grossly nonfocal    Psych: Oriented x 3. No anxiety or agitation      Data:  CBC:   Recent Labs     12/02/20  0553 12/02/20  1724 12/03/20  0528 12/03/20  0532   WBC 18.8* 20.4* 25.5*  --    HGB 6.8* 7.8* 7.8*  --    HCT 19.3* 22.1* 23.6* 23.1*   MCV 98.2 95.5 100.5*  --     306 266  --      BMP:   No results for input(s): NA, K, CL, CO2, PHOS, BUN, CREATININE in the last 72 hours.     Invalid input(s): CA  LIVER PROFILE:   No results for input(s): AST, ALT, LIPASE, BILIDIR, BILITOT, ALKPHOS in the last 72 hours. Invalid input(s): AMYLASE,  ALB  PT/INR: No results for input(s): PROTIME, INR in the last 72 hours. CULTURES  Blood: NGTD  Rapid COVID: not detected    RADIOLOGY  CT CHEST PULMONARY EMBOLISM W CONTRAST   Final Result   No large central filling defect in the pulmonary arteries however evaluation   of lobar and subsegmental pulmonary arteries is nondiagnostic due to poor   contrast bolus. Parenchymal changes as above concerning for infection. XR CHEST PORTABLE   Final Result   New mild left basilar airspace disease which may be related to atelectasis   versus pneumonia. XR CHEST PORTABLE   Final Result   1. No radiographic evidence of lung consolidation. 2. Mild cardiomegaly. Assessment/Plan:  Sickle cell crisis-acute pain episode. - pain control: Dilaudid, Oxycodone-IR, Toradol as needed  - continue Folic acid, Hydrea  - Hem/Onc consult  - aggressive IVF's, supplemental O2.   - feeling better. Anemia  - secondary to sickle cell  - Hgb dropped to 6.8. Transfuse 1 unit PRBC  - monitor CBC. Up to 7.8 today. Leukocytosis  - back up to 25.5 today. CT chest ordered. Showed ?pneumonia. No PE. On Levaquin. Stop Zithromax. Acute Hypoxia  - on 2 L O2. Wean as tolerated. - he has history of asthma. Start Xopenex. He uses this at home. Fever  - checked CT chest  - Zithromax for D#2. Change to PO Levaquin.     Chest pain, back pain  - CTPA done      Discharge planning for am.     DVT Prophylaxis: Lovenox  Diet: DIET GENERAL;  Code Status: Full Code           Mike Lewis 12/4/2020 10:57 AM

## 2020-12-04 NOTE — PROGRESS NOTES
When rate of blood went from 75mL/hr to 125mL/hr patient began to have chest pains, RR of 24 and feeling shaking with chills. 125mL/hr of blood began an hour after blood reached vein. RN at bedside slowed the blood to 75mL/hr and patient s/s subsided.

## 2020-12-04 NOTE — PROGRESS NOTES
ONCOLOGY FOLLOW-UP:       Primary Oncologist:  Dr. Ladan Joaquin:       Patient Active Problem List   Diagnosis Code    Sickle cell pain crisis ([facility] D57.00    Asthma J45.909    Sickle cell crisis ([facility] D57.00    Sepsis ([facility] [facility] A41.9    [facility] T40.601A    Opiate antagonist poisoning, accidental or unintentional, initial encounter T50.7X1A    Leukocytosis D72.829    Hypoxia R09.02    Anemia D64.9    Other chest pain R07.89    Pneumonia due to infectious organism J18.9       INTERVAL HISTORY:       Pt remains admitted. Transfused PRBC 12/2. Has still had low grade fevers intermittently over past 24 hrs but overall fevers seem to have improved. AM CBC is pending. He feels his pain has improved some but still reports some chest tightness. REVIEW OF SYSTEMS:       10 point ROS completed. Pertinent positives in HPI, otherwise negative.      PHYSICAL EXAM:       Vitals:    12/04/20 0419   BP:    Pulse:    Resp:    Temp:    SpO2: 90%       General appearance: alert and cooperative  Head: Normocephalic, without obvious abnormality, atraumatic  Neck: No palpable lymphadenopathy in supraclavicular or cervical chains  Lungs: Clear to auscultation bilaterally, no audible rales, wheezes or crackles  Heart: Regular rate and rhythm, S1, S2 normal  Abdomen: Soft, non-tender; bowel sounds normal; no masses,  no organomegaly  Extremities: without cyanosis, clubbing, edema or asymmetry  Skin: No jaundice, purpura or petechiae      LABS:     Lab Results   Component Value Date    WBC 25.5 (H) 12/03/2020    HGB 7.8 (L) 12/03/2020    HCT 23.1 (L) 12/03/2020    .5 (H) 12/03/2020     12/03/2020       Lab Results   Component Value Date    GLUCOSE 98 12/01/2020    BUN 9 12/01/2020    CREATININE 0.9 12/01/2020    K 4.4 12/01/2020       Lab Results   Component Value Date    ALKPHOS 88 12/01/2020    ALT 14 12/01/2020    AST 36 12/01/2020    BILITOT 5.2 (H) 12/01/2020    BILIDIR 0.7 (H) 09/27/2020    PROT 7.3 12/01/2020       IMAGING:     Xr Chest Portable  Result Date: 12/1/2020  1. No radiographic evidence of lung consolidation. 2. Mild cardiomegaly. CTPA 12/3/2020  No large central filling defect in the pulmonary arteries however evaluation    of lobar and subsegmental pulmonary arteries is nondiagnostic due to poor    contrast bolus.         Parenchymal changes as above concerning for infection. ASSESSMENT:     Problem List Items Addressed This Visit     Sickle cell pain crisis ([facility] [facility] - Primary                PLAN:     1.  Sickle Cell Pain Crisis  - pain in chest and upper back different than before   - low grade fever x 1  - continue IVF and IV analgesics for acute crisis - dilaudid and oxycodone prn  - continue IV  toradol  - continue hydrea/folate  - transfused 1 unit PRBC with Hgb < 7 12/2 - CBC pending this AM  - day 2 levaquin for low grade fevers - blood cx pending, CXR negative, COVID negative  - monitor daily CBC and hemolytic parameters  - CTPA showed no PE  - will add bronchodilators    Mai Bynum MD

## 2020-12-05 LAB
ALBUMIN SERPL-MCNC: 3.6 G/DL (ref 3.4–5)
ALP BLD-CCNC: 129 U/L (ref 40–129)
ALT SERPL-CCNC: 34 U/L (ref 10–40)
ANION GAP SERPL CALCULATED.3IONS-SCNC: 9 MMOL/L (ref 3–16)
ANISOCYTOSIS: ABNORMAL
AST SERPL-CCNC: 44 U/L (ref 15–37)
BANDED NEUTROPHILS RELATIVE PERCENT: 1 % (ref 0–7)
BASOPHILS ABSOLUTE: 0 K/UL (ref 0–0.2)
BASOPHILS RELATIVE PERCENT: 0 %
BILIRUB SERPL-MCNC: 4.2 MG/DL (ref 0–1)
BILIRUBIN DIRECT: 2.4 MG/DL (ref 0–0.3)
BILIRUBIN, INDIRECT: 1.8 MG/DL (ref 0–1)
BLOOD CULTURE, ROUTINE: NORMAL
BUN BLDV-MCNC: 7 MG/DL (ref 7–20)
CALCIUM SERPL-MCNC: 9.4 MG/DL (ref 8.3–10.6)
CHLORIDE BLD-SCNC: 106 MMOL/L (ref 99–110)
CO2: 24 MMOL/L (ref 21–32)
CREAT SERPL-MCNC: 0.6 MG/DL (ref 0.9–1.3)
EOSINOPHILS ABSOLUTE: 0 K/UL (ref 0–0.6)
EOSINOPHILS RELATIVE PERCENT: 0 %
GFR AFRICAN AMERICAN: >60
GFR NON-AFRICAN AMERICAN: >60
GLUCOSE BLD-MCNC: 100 MG/DL (ref 70–99)
HAPTOGLOBIN: 66 MG/DL (ref 30–200)
HCT VFR BLD CALC: 19.7 % (ref 40.5–52.5)
HCT VFR BLD CALC: 21.9 % (ref 40.5–52.5)
HEMOGLOBIN: 7.4 G/DL (ref 13.5–17.5)
IMMATURE RETIC FRACT: 0.53 (ref 0.21–0.37)
IMMATURE RETIC FRACT: 0.53 (ref 0.21–0.37)
LACTATE DEHYDROGENASE: 529 U/L (ref 100–190)
LYMPHOCYTES ABSOLUTE: 1.2 K/UL (ref 1–5.1)
LYMPHOCYTES RELATIVE PERCENT: 6 %
MCH RBC QN AUTO: 32 PG (ref 26–34)
MCHC RBC AUTO-ENTMCNC: 33.7 G/DL (ref 31–36)
MCV RBC AUTO: 95 FL (ref 80–100)
MONOCYTES ABSOLUTE: 1.6 K/UL (ref 0–1.3)
MONOCYTES RELATIVE PERCENT: 8 %
NEUTROPHILS ABSOLUTE: 17 K/UL (ref 1.7–7.7)
NEUTROPHILS RELATIVE PERCENT: 85 %
PDW BLD-RTO: 18.8 % (ref 12.4–15.4)
PLATELET # BLD: 382 K/UL (ref 135–450)
PMV BLD AUTO: 8.6 FL (ref 5–10.5)
POLYCHROMASIA: ABNORMAL
POTASSIUM REFLEX MAGNESIUM: 3.9 MMOL/L (ref 3.5–5.1)
RBC # BLD: 2.31 M/UL (ref 4.2–5.9)
RETICULOCYTE ABSOLUTE COUNT: 0.16 M/UL
RETICULOCYTE ABSOLUTE COUNT: 0.17 M/UL
RETICULOCYTE COUNT PCT: 7.48 % (ref 0.5–2.18)
RETICULOCYTE COUNT PCT: 7.97 % (ref 0.5–2.18)
SICKLE CELLS: ABNORMAL
SODIUM BLD-SCNC: 139 MMOL/L (ref 136–145)
TARGET CELLS: ABNORMAL
TOTAL PROTEIN: 6.8 G/DL (ref 6.4–8.2)
WBC # BLD: 19.8 K/UL (ref 4–11)

## 2020-12-05 PROCEDURE — 87070 CULTURE OTHR SPECIMN AEROBIC: CPT

## 2020-12-05 PROCEDURE — 87449 NOS EACH ORGANISM AG IA: CPT

## 2020-12-05 PROCEDURE — U0003 INFECTIOUS AGENT DETECTION BY NUCLEIC ACID (DNA OR RNA); SEVERE ACUTE RESPIRATORY SYNDROME CORONAVIRUS 2 (SARS-COV-2) (CORONAVIRUS DISEASE [COVID-19]), AMPLIFIED PROBE TECHNIQUE, MAKING USE OF HIGH THROUGHPUT TECHNOLOGIES AS DESCRIBED BY CMS-2020-01-R: HCPCS

## 2020-12-05 PROCEDURE — 80048 BASIC METABOLIC PNL TOTAL CA: CPT

## 2020-12-05 PROCEDURE — 85045 AUTOMATED RETICULOCYTE COUNT: CPT

## 2020-12-05 PROCEDURE — 83010 ASSAY OF HAPTOGLOBIN QUANT: CPT

## 2020-12-05 PROCEDURE — 99255 IP/OBS CONSLTJ NEW/EST HI 80: CPT | Performed by: INTERNAL MEDICINE

## 2020-12-05 PROCEDURE — 6360000002 HC RX W HCPCS: Performed by: INTERNAL MEDICINE

## 2020-12-05 PROCEDURE — 83615 LACTATE (LD) (LDH) ENZYME: CPT

## 2020-12-05 PROCEDURE — 80076 HEPATIC FUNCTION PANEL: CPT

## 2020-12-05 PROCEDURE — 99232 SBSQ HOSP IP/OBS MODERATE 35: CPT | Performed by: PHYSICIAN ASSISTANT

## 2020-12-05 PROCEDURE — 87205 SMEAR GRAM STAIN: CPT

## 2020-12-05 PROCEDURE — 94761 N-INVAS EAR/PLS OXIMETRY MLT: CPT

## 2020-12-05 PROCEDURE — 36415 COLL VENOUS BLD VENIPUNCTURE: CPT

## 2020-12-05 PROCEDURE — 2580000003 HC RX 258: Performed by: INTERNAL MEDICINE

## 2020-12-05 PROCEDURE — 94640 AIRWAY INHALATION TREATMENT: CPT

## 2020-12-05 PROCEDURE — 94770 HC ETCO2 MONITOR DAILY: CPT

## 2020-12-05 PROCEDURE — 1200000000 HC SEMI PRIVATE

## 2020-12-05 PROCEDURE — 85025 COMPLETE CBC W/AUTO DIFF WBC: CPT

## 2020-12-05 PROCEDURE — 6370000000 HC RX 637 (ALT 250 FOR IP): Performed by: INTERNAL MEDICINE

## 2020-12-05 PROCEDURE — 2700000000 HC OXYGEN THERAPY PER DAY

## 2020-12-05 RX ORDER — METHYLPREDNISOLONE SODIUM SUCCINATE 40 MG/ML
40 INJECTION, POWDER, LYOPHILIZED, FOR SOLUTION INTRAMUSCULAR; INTRAVENOUS DAILY
Status: DISCONTINUED | OUTPATIENT
Start: 2020-12-05 | End: 2020-12-09 | Stop reason: HOSPADM

## 2020-12-05 RX ADMIN — HYDROMORPHONE HYDROCHLORIDE 1 MG: 1 INJECTION, SOLUTION INTRAMUSCULAR; INTRAVENOUS; SUBCUTANEOUS at 04:52

## 2020-12-05 RX ADMIN — LEVALBUTEROL 0.63 MG: 1.25 SOLUTION, CONCENTRATE RESPIRATORY (INHALATION) at 07:56

## 2020-12-05 RX ADMIN — Medication: at 08:54

## 2020-12-05 RX ADMIN — METHYLPREDNISOLONE SODIUM SUCCINATE 40 MG: 40 INJECTION, POWDER, FOR SOLUTION INTRAMUSCULAR; INTRAVENOUS at 17:15

## 2020-12-05 RX ADMIN — LEVALBUTEROL 0.63 MG: 1.25 SOLUTION, CONCENTRATE RESPIRATORY (INHALATION) at 19:30

## 2020-12-05 RX ADMIN — Medication 10 MG: at 17:12

## 2020-12-05 RX ADMIN — FOLIC ACID 2 MG: 1 TABLET ORAL at 09:06

## 2020-12-05 RX ADMIN — ACETAMINOPHEN 1000 MG: 500 TABLET ORAL at 14:04

## 2020-12-05 RX ADMIN — Medication 10 ML: at 09:10

## 2020-12-05 RX ADMIN — SODIUM CHLORIDE: 9 INJECTION, SOLUTION INTRAVENOUS at 18:50

## 2020-12-05 RX ADMIN — HYDROXYUREA 1000 MG: 500 CAPSULE ORAL at 09:08

## 2020-12-05 RX ADMIN — HYDROMORPHONE HYDROCHLORIDE 1 MG: 1 INJECTION, SOLUTION INTRAMUSCULAR; INTRAVENOUS; SUBCUTANEOUS at 07:45

## 2020-12-05 RX ADMIN — HYDROXYUREA 1000 MG: 500 CAPSULE ORAL at 20:22

## 2020-12-05 RX ADMIN — LEVALBUTEROL 0.63 MG: 1.25 SOLUTION, CONCENTRATE RESPIRATORY (INHALATION) at 13:00

## 2020-12-05 RX ADMIN — LEVOFLOXACIN 500 MG: 500 TABLET, FILM COATED ORAL at 09:06

## 2020-12-05 RX ADMIN — PANTOPRAZOLE SODIUM 40 MG: 40 TABLET, DELAYED RELEASE ORAL at 09:07

## 2020-12-05 ASSESSMENT — PAIN SCALES - GENERAL
PAINLEVEL_OUTOF10: 0
PAINLEVEL_OUTOF10: 8
PAINLEVEL_OUTOF10: 7
PAINLEVEL_OUTOF10: 8
PAINLEVEL_OUTOF10: 4
PAINLEVEL_OUTOF10: 6
PAINLEVEL_OUTOF10: 8
PAINLEVEL_OUTOF10: 5
PAINLEVEL_OUTOF10: 6
PAINLEVEL_OUTOF10: 3
PAINLEVEL_OUTOF10: 8
PAINLEVEL_OUTOF10: 6
PAINLEVEL_OUTOF10: 7
PAINLEVEL_OUTOF10: 0

## 2020-12-05 ASSESSMENT — PAIN DESCRIPTION - PAIN TYPE
TYPE: ACUTE PAIN
TYPE: ACUTE PAIN

## 2020-12-05 ASSESSMENT — PAIN DESCRIPTION - LOCATION: LOCATION: GENERALIZED

## 2020-12-05 NOTE — FLOWSHEET NOTE
Pt found with 02 off, pt asleep and mouth breathing, 80%. Reapplied 02 at 2lnc. Saturation stayed at 84%. Increased 02 to 3lnc, saturation 90-95%.  Gosia Lantigua

## 2020-12-05 NOTE — PROGRESS NOTES
Per Dr. Willy Del Cid, hold off on 2nd unit due to sickle cell crisis and fever. Have AM hospitalist and oncologist decide when it should be given.  Ivelisse Cruz

## 2020-12-05 NOTE — FLOWSHEET NOTE
12/04/20 1916   Vital Signs   Temp 98.4 °F (36.9 °C)   Temp Source Oral   Pulse 114   Heart Rate Source Monitor   Resp 18   /64   BP Location Left upper arm   Patient Position Semi fowlers   Level of Consciousness Alert (0)   MEWS Score 3   Oxygen Therapy   SpO2 96 %   O2 Device Nasal cannula   O2 Flow Rate (L/min) 2 L/min   Pt c/o generalized pain. Dilaudid given at this time per order. Pt to receive 1 more unit of PRBC tonight.  Sheryl Albert

## 2020-12-05 NOTE — FLOWSHEET NOTE
12/04/20 2329   Vital Signs   Temp 101.9 °F (38.8 °C)   Temp Source Oral   Pulse 111   Resp 16   BP (!) 109/57   BP Location Left upper arm   Level of Consciousness Alert (0)   MEWS Score 5   Oxygen Therapy   SpO2 98 %   O2 Device Nasal cannula   O2 Flow Rate (L/min) 2 L/min   Pt given tylenol 1000 mg po for fever. Pt c/o generalized pain. Pt given Dilaudid IV per order. Perfectserve to Dr. Wilber Linares to find out if we should hold off with the blood since pt has a fever. Pt and pt's mom per phone were updated.   Anay Confer

## 2020-12-05 NOTE — PROGRESS NOTES
Awake, in bed with c/o pain 7/10 dilaudid 1mg give per request. Refuses oxycodone, states it doesn't work.

## 2020-12-05 NOTE — PROGRESS NOTES
Progress Note    Admit Date:  12/1/2020    23 y.o. male who presented to the hospital with a chief complaint of uncontrolled chest and upper back pain. The patient has a history of sickle cell anemia and is on chronic narcotic therapy for pain as needed. Subjective:  Mr. Rita Garcia was still having uncontrolled pain this morning, dilaudid PCA was started per hem onc orders. I evaluated him after the PCA was started and his pain is better controlled  - tmax 100.1F over past 24 hours  - remains hypoxic, on 3L today       Objective:   Patient Vitals for the past 4 hrs:   BP Temp Temp src Pulse Resp SpO2   12/05/20 1445 (!) 102/50 99.4 °F (37.4 °C) Oral 98 18 100 %   12/05/20 1353 (!) 92/47 100.1 °F (37.8 °C) Oral 99 18 100 %   12/05/20 1302 -- -- -- -- -- 99 %   12/05/20 1145 -- -- -- -- -- 99 %            Intake/Output Summary (Last 24 hours) at 12/5/2020 1503  Last data filed at 12/5/2020 1302  Gross per 24 hour   Intake 921.25 ml   Output 1600 ml   Net -678.75 ml       Physical Exam:  Gen: Young AA male. Not in distress but visibly diaphoretic. Very pleasant and calm  Eyes: PERRL. No sclera icterus. No conjunctival injection. ENT: No discharge. Pharynx clear. Neck: Trachea midline. Resp: No accessory muscle use. No crackles. No wheezes. No rhonchi. CV: Regular rate. Regular rhythm. No murmur. No rub. No edema. Capillary Refill: Brisk,< 3 seconds   Peripheral Pulses: +2 palpable, equal bilaterally   GI: Non-tender. Non-distended. Normal bowel sounds. No hernia. Skin: Warm and dry. No nodule on exposed extremities. No rash on exposed extremities. M/S: No cyanosis. No joint deformity. No clubbing. Neuro: Awake. Grossly nonfocal    Psych: Oriented x 3.  No anxiety or agitation      Data:  CBC:   Recent Labs     12/03/20  0528  12/04/20  0536 12/04/20  1046 12/05/20  0701   WBC 25.5*  --   --  23.8* 19.8*   HGB 7.8*  --   --  6.1* 7.4*   HCT 23.6*   < > 19.7* 18.1* 21.9*   .5*  --   --  97.7 95.0     --   --  322 382    < > = values in this interval not displayed. BMP:   No results for input(s): NA, K, CL, CO2, PHOS, BUN, CREATININE in the last 72 hours. Invalid input(s): CA  LIVER PROFILE:   No results for input(s): AST, ALT, LIPASE, BILIDIR, BILITOT, ALKPHOS in the last 72 hours. Invalid input(s): AMYLASE,  ALB  PT/INR: No results for input(s): PROTIME, INR in the last 72 hours. CULTURES  Blood: NGTD  Rapid COVID: not detected  Sputum: ordered  Legionella ag: ordered  Strep pneumoniae ag: ordered    COVID PCR: PENDING    RADIOLOGY  CT CHEST PULMONARY EMBOLISM W CONTRAST   Final Result   No large central filling defect in the pulmonary arteries however evaluation   of lobar and subsegmental pulmonary arteries is nondiagnostic due to poor   contrast bolus. Parenchymal changes as above concerning for infection. XR CHEST PORTABLE   Final Result   New mild left basilar airspace disease which may be related to atelectasis   versus pneumonia. XR CHEST PORTABLE   Final Result   1. No radiographic evidence of lung consolidation. 2. Mild cardiomegaly. Assessment/Plan:    #Sickle cell crisis  - patient presented with chest and upper back pain.  evaluated with CT Chest (neg for PE- did show GGO bilaterally- see below)  - hem/onc consulted  - aggressive IVF administration  - Folic acid, Hydrea  - supplemental O2  - pain control- dilaudid PCA started 12/5  - PRBC transfusion on 12/2 and 12/4   - Lovenox for DVT ppx   - appreciate pulm input- solumedrol added     #Abnormal chest CT- bilateral GGO  #Leucocytosis  #Fever  - bilateral GGO. Concern for bacterial or viral PNA, or acute chest syndrome  - he received 2 days of azithromycin, now on Levaquin 500 mg D#4  - rapid COVID 19 test negative, PCR ordered 12/5, droplet + precautions until PCR resulted  - f/u cultures  - appreciate pulmonary input - solumedrol added     #Acute Hypoxia  - 2/2 above   - on 3 L O2. Wean as tolerated.      #Asthma  - inhalers    DVT Prophylaxis: Lovenox  Diet: DIET GENERAL;  Code Status: Full Code    Deyanira Santos PA-C  12/5/2020

## 2020-12-05 NOTE — PROGRESS NOTES
ONCOLOGY FOLLOW-UP:       Primary Oncologist:  Dr. Westley Bunn:       Patient Active Problem List   Diagnosis Code    Sickle cell pain crisis ([facility] D57.00    Asthma J45.909    Sickle cell crisis ([facility] D57.00    [facility] A41.9    Opiate overdose ([facility] T40.601A    Opiate antagonist poisoning, accidental or unintentional, initial encounter T50.7X1A    Leukocytosis D72.829    Hypoxia R09.02    Anemia D64.9    Other chest pain R07.89    Pneumonia due to infectious organism J18.9       INTERVAL HISTORY:       Pt remains admitted. Transfused PRBC 12/2, 12/4. Deidre Tsanggo He has still had some fevers overnight. 02 sats still low off supplemental 02. He is still having chest pain. REVIEW OF SYSTEMS:       10 point ROS completed. Pertinent positives in HPI, otherwise negative.      PHYSICAL EXAM:       Vitals:    12/05/20 0718   BP: 132/66   Pulse: 92   Resp: 20   Temp: 97.9 °F (36.6 °C)   SpO2: 100%       General appearance: alert and cooperative  Head: Normocephalic, without obvious abnormality, atraumatic  Neck: No palpable lymphadenopathy in supraclavicular or cervical chains  Lungs: Clear to auscultation bilaterally, no audible rales, wheezes or crackles  Heart: Regular rate and rhythm, S1, S2 normal  Abdomen: Soft, non-tender; bowel sounds normal; no masses,  no organomegaly  Extremities: without cyanosis, clubbing, edema or asymmetry  Skin: No jaundice, purpura or petechiae      LABS:     Lab Results   Component Value Date    WBC 19.8 (H) 12/05/2020    HGB 7.4 (L) 12/05/2020    HCT 21.9 (L) 12/05/2020    MCV 95.0 12/05/2020     12/05/2020       Lab Results   Component Value Date    GLUCOSE 98 12/01/2020    BUN 9 12/01/2020    CREATININE 0.9 12/01/2020    K 4.4 12/01/2020       Lab Results   Component Value Date    ALKPHOS 88 12/01/2020    ALT 14 12/01/2020    AST 36 12/01/2020    BILITOT 5.2 (H) 12/01/2020    BILIDIR 0.7 (H) 09/27/2020    PROT 7.3 12/01/2020       IMAGING:     Xr Chest Portable  Result Date: 12/1/2020  1. No radiographic evidence of lung consolidation. 2. Mild cardiomegaly. CTPA 12/3/2020  No large central filling defect in the pulmonary arteries however evaluation    of lobar and subsegmental pulmonary arteries is nondiagnostic due to poor    contrast bolus.         Parenchymal changes as above concerning for infection. ASSESSMENT:     Problem List Items Addressed This Visit     Sickle cell pain crisis ([address] - Primary                PLAN:     1.  Sickle Cell Pain Crisis  - pain in chest and upper back different than before   - low grade fever x 1  - continue IVF and IV analgesics for acute crisis   - will switch to PCA given minimal improvement in symptoms - will start 0.5 mg/ht basal rate with 0.2 prn q 20 min  - continue IV  toradol  - continue hydrea/folate  - transfused 1 unit PRBC with Hgb < 7 12/2, 12/4 - Hgb 7.4 this AM  - day 3 levaquin for low grade fevers - blood cx NGTD, infectious changes on chest CT, COVID negative  - monitor daily CBC and hemolytic parameters  - CTPA showed no PE  - will continue bronchodilators  - consider pulmonary consult with hypoxia although could be narcotic induced    Glenn Ravi MD

## 2020-12-05 NOTE — FLOWSHEET NOTE
12/05/20 0100   Vital Signs   Temp 101.5 °F (38.6 °C)   Temp Source Oral   Pulse 98   Resp 16   BP (!) 96/45   BP Location Left upper arm   Patient Position Semi fowlers   Level of Consciousness Alert (0)   MEWS Score 4   Oxygen Therapy   SpO2 100 %   O2 Device Nasal cannula   O2 Flow Rate (L/min) 2 L/min   Pt resting in bed, no acute distress. Will recheck vitals in 2 hours. Updated Dr. Terry Barthel regarding fever.  Danial Osborn

## 2020-12-05 NOTE — CONSULTS
input(s): AMYLASE,  ALB  PT/INR: No results for input(s): PROTIME, INR in the last 72 hours. APTT: No results for input(s): APTT in the last 72 hours. UA:No results for input(s): NITRITE, COLORU, PHUR, LABCAST, WBCUA, RBCUA, MUCUS, TRICHOMONAS, YEAST, BACTERIA, CLARITYU, SPECGRAV, LEUKOCYTESUR, UROBILINOGEN, BILIRUBINUR, BLOODU, GLUCOSEU, AMORPHOUS in the last 72 hours. Invalid input(s): KETONESU  No results for input(s): PHART, [id number], PO2ART in the last 72 hours. Chest imaging was reviewed by me and showed:  Chest CTA 12/3: no PE; Lungs/pleura: No pneumothorax.  Patchy ground-glass attenuation posteriorly in the upper lobes.  Scattered ground-glass attenuation and patchy   consolidation within the lower lobes. ASSESSMENT:  ·  Acute respiratory failure with hypoxemia  · Sickle cell crisis  · Pneumonia versus acute chest syndrome  · Rule out COVID-19  · Anemia    PLAN:  · Supplemental oxygen to maintain SaO2 >92%; wean as tolerated   · Hematology following status post blood transfusion; patient may need exchange transfusion if worsens and turns out to be COVID-19 positive  · Covid 19 pcr testing and respiratory droplet isolation is appropriate   · Abx: Levofloxacin day #4  · Steroids-Solu-Medrol 40 mg IV daily  · Inhaled bronchodilators -MDI preferred if needed    Thank you for the consult.

## 2020-12-06 LAB
ANION GAP SERPL CALCULATED.3IONS-SCNC: 11 MMOL/L (ref 3–16)
BASOPHILS ABSOLUTE: 0.1 K/UL (ref 0–0.2)
BASOPHILS RELATIVE PERCENT: 0.9 %
BLOOD BANK DISPENSE STATUS: NORMAL
BLOOD BANK PRODUCT CODE: NORMAL
BPU ID: NORMAL
BUN BLDV-MCNC: 8 MG/DL (ref 7–20)
CALCIUM SERPL-MCNC: 9.7 MG/DL (ref 8.3–10.6)
CHLORIDE BLD-SCNC: 108 MMOL/L (ref 99–110)
CO2: 22 MMOL/L (ref 21–32)
CREAT SERPL-MCNC: 0.6 MG/DL (ref 0.9–1.3)
DESCRIPTION BLOOD BANK: NORMAL
EOSINOPHILS ABSOLUTE: 0.3 K/UL (ref 0–0.6)
EOSINOPHILS RELATIVE PERCENT: 2.5 %
GFR AFRICAN AMERICAN: >60
GFR NON-AFRICAN AMERICAN: >60
GLUCOSE BLD-MCNC: 85 MG/DL (ref 70–99)
HAPTOGLOBIN: <10 MG/DL (ref 30–200)
HCT VFR BLD CALC: 22.1 % (ref 40.5–52.5)
HEMOGLOBIN: 7.2 G/DL (ref 13.5–17.5)
IMMATURE RETIC FRACT: 0.5 (ref 0.21–0.37)
L. PNEUMOPHILA SEROGP 1 UR AG: NORMAL
LACTATE DEHYDROGENASE: 504 U/L (ref 100–190)
LYMPHOCYTES ABSOLUTE: 1.9 K/UL (ref 1–5.1)
LYMPHOCYTES RELATIVE PERCENT: 14.8 %
MCH RBC QN AUTO: 31.9 PG (ref 26–34)
MCHC RBC AUTO-ENTMCNC: 32.6 G/DL (ref 31–36)
MCV RBC AUTO: 98 FL (ref 80–100)
MONOCYTES ABSOLUTE: 1.4 K/UL (ref 0–1.3)
MONOCYTES RELATIVE PERCENT: 10.7 %
NEUTROPHILS ABSOLUTE: 9.3 K/UL (ref 1.7–7.7)
NEUTROPHILS RELATIVE PERCENT: 71.1 %
PDW BLD-RTO: 19.1 % (ref 12.4–15.4)
PLATELET # BLD: 340 K/UL (ref 135–450)
PMV BLD AUTO: 9 FL (ref 5–10.5)
POTASSIUM REFLEX MAGNESIUM: 4.6 MMOL/L (ref 3.5–5.1)
RBC # BLD: 2.26 M/UL (ref 4.2–5.9)
RETICULOCYTE ABSOLUTE COUNT: 0.17 M/UL
RETICULOCYTE COUNT PCT: 7.49 % (ref 0.5–2.18)
SARS-COV-2, PCR: NOT DETECTED
SODIUM BLD-SCNC: 141 MMOL/L (ref 136–145)
STREP PNEUMONIAE ANTIGEN, URINE: NORMAL
WBC # BLD: 13.1 K/UL (ref 4–11)

## 2020-12-06 PROCEDURE — 85045 AUTOMATED RETICULOCYTE COUNT: CPT

## 2020-12-06 PROCEDURE — 36415 COLL VENOUS BLD VENIPUNCTURE: CPT

## 2020-12-06 PROCEDURE — 6370000000 HC RX 637 (ALT 250 FOR IP): Performed by: INTERNAL MEDICINE

## 2020-12-06 PROCEDURE — 6360000002 HC RX W HCPCS: Performed by: INTERNAL MEDICINE

## 2020-12-06 PROCEDURE — 94770 HC ETCO2 MONITOR DAILY: CPT

## 2020-12-06 PROCEDURE — 1200000000 HC SEMI PRIVATE

## 2020-12-06 PROCEDURE — 85025 COMPLETE CBC W/AUTO DIFF WBC: CPT

## 2020-12-06 PROCEDURE — 94761 N-INVAS EAR/PLS OXIMETRY MLT: CPT

## 2020-12-06 PROCEDURE — 99232 SBSQ HOSP IP/OBS MODERATE 35: CPT | Performed by: INTERNAL MEDICINE

## 2020-12-06 PROCEDURE — 80048 BASIC METABOLIC PNL TOTAL CA: CPT

## 2020-12-06 PROCEDURE — 2580000003 HC RX 258: Performed by: INTERNAL MEDICINE

## 2020-12-06 PROCEDURE — 99233 SBSQ HOSP IP/OBS HIGH 50: CPT | Performed by: INTERNAL MEDICINE

## 2020-12-06 PROCEDURE — 83615 LACTATE (LD) (LDH) ENZYME: CPT

## 2020-12-06 PROCEDURE — 83010 ASSAY OF HAPTOGLOBIN QUANT: CPT

## 2020-12-06 PROCEDURE — 2700000000 HC OXYGEN THERAPY PER DAY

## 2020-12-06 RX ADMIN — FOLIC ACID 2 MG: 1 TABLET ORAL at 09:00

## 2020-12-06 RX ADMIN — HYDROXYUREA 1000 MG: 500 CAPSULE ORAL at 09:00

## 2020-12-06 RX ADMIN — ACETAMINOPHEN 1000 MG: 500 TABLET ORAL at 19:54

## 2020-12-06 RX ADMIN — SODIUM CHLORIDE: 9 INJECTION, SOLUTION INTRAVENOUS at 19:54

## 2020-12-06 RX ADMIN — METHYLPREDNISOLONE SODIUM SUCCINATE 40 MG: 40 INJECTION, POWDER, FOR SOLUTION INTRAMUSCULAR; INTRAVENOUS at 09:00

## 2020-12-06 RX ADMIN — PANTOPRAZOLE SODIUM 40 MG: 40 TABLET, DELAYED RELEASE ORAL at 09:01

## 2020-12-06 RX ADMIN — Medication 10 MG: at 08:57

## 2020-12-06 RX ADMIN — Medication: at 21:39

## 2020-12-06 RX ADMIN — LEVOFLOXACIN 500 MG: 500 TABLET, FILM COATED ORAL at 09:00

## 2020-12-06 RX ADMIN — HYDROXYUREA 1000 MG: 500 CAPSULE ORAL at 19:54

## 2020-12-06 RX ADMIN — ACETAMINOPHEN 1000 MG: 500 TABLET ORAL at 01:06

## 2020-12-06 ASSESSMENT — PAIN SCALES - GENERAL
PAINLEVEL_OUTOF10: 5
PAINLEVEL_OUTOF10: 6
PAINLEVEL_OUTOF10: 3
PAINLEVEL_OUTOF10: 6
PAINLEVEL_OUTOF10: 4
PAINLEVEL_OUTOF10: 3

## 2020-12-06 ASSESSMENT — PAIN DESCRIPTION - PAIN TYPE: TYPE: ACUTE PAIN

## 2020-12-06 ASSESSMENT — PAIN DESCRIPTION - LOCATION: LOCATION: GENERALIZED

## 2020-12-06 NOTE — PROGRESS NOTES
Pulmonary Progress Note    CC: Acute respiratory failure with hypoxemia    Subjective:   Patient feels slightly better. Still with chest discomfort. Intake/Output Summary (Last 24 hours) at 12/6/2020 1316  Last data filed at 12/6/2020 0951  Gross per 24 hour   Intake 440.36 ml   Output 800 ml   Net -359.64 ml       Exam:   BP (!) 98/54   Pulse 68   Temp 98.8 °F (37.1 °C) (Oral)   Resp 17   Ht 5' 8\" (1.727 m)   Wt 188 lb (85.3 kg)   SpO2 99%   BMI 28.59 kg/m²  on 1l NC  Gen: . Veronica Hugo man in no acute distress, ncat   Eyes: PERRL. No sclera icterus. No conjunctival injection. ENT: No discharge. Pharynx clear. Neck: Trachea midline. No obvious mass. Resp: No accessory muscle use. few crackles. No wheezes. No rhonchi. No dullness on percussion. CV: Regular rate. Regular rhythm. No murmur or rub. No edema. GI: Non-tender. Non-distended. No hernia. Skin: Warm and dry. No nodule on exposed extremities. Lymph: No cervical LAD. No supraclavicular LAD. M/S: No cyanosis. No joint deformity. No clubbing. Neuro: Awake. Alert. Moves all four extremities.      Scheduled Meds:   methylPREDNISolone  40 mg Intravenous Daily    levoFLOXacin  500 mg Oral Daily    folic acid  2 mg Oral Daily    sodium chloride flush  10 mL Intravenous 2 times per day    enoxaparin  40 mg Subcutaneous Daily    pantoprazole  40 mg Oral QAM AC    hydroxyurea  1,000 mg Oral BID     Continuous Infusions:   HYDROmorphone      sodium chloride 125 mL/hr at 12/05/20 1850     PRN Meds:  sodium chloride flush, acetaminophen **OR** acetaminophen, polyethylene glycol, promethazine **OR** ondansetron, acetaminophen, oxyCODONE    Labs:  CBC:   Recent Labs     12/04/20  1046 12/05/20  0701 12/06/20  0624   WBC 23.8* 19.8* 13.1*   HGB 6.1* 7.4* 7.2*   HCT 18.1* 21.9* 22.1*   MCV 97.7 95.0 98.0    382 340     BMP:   Recent Labs     12/05/20  1500 12/06/20  0624    141   K 3.9 4.6    108   CO2 24 22   BUN 7 8

## 2020-12-06 NOTE — PROGRESS NOTES
Progress Note    Admit Date:  12/1/2020    23 y.o. male who presented to the hospital with a chief complaint of uncontrolled chest and upper back pain. The patient has a history of sickle cell anemia and is on chronic narcotic therapy for pain as needed. admitted with sickle cell crises      COVID PCR neg   legionella Ag neg   strep pneumo ag neg     Subjective:  Mr. Klever Burns is better today. on dilaudid PCA . low grade fevers    hypoxia improved ,on 3L-> 1 L today       Objective:   Patient Vitals for the past 4 hrs:   BP Temp Temp src Pulse Resp SpO2   12/06/20 1503 -- -- -- -- 18 98 %   12/06/20 1324 108/68 98 °F (36.7 °C) Oral 98 17 99 %            Intake/Output Summary (Last 24 hours) at 12/6/2020 1630  Last data filed at 12/6/2020 1324  Gross per 24 hour   Intake 680.36 ml   Output 1400 ml   Net -719.64 ml       Physical Exam:  Gen: Young AA male. Not in distress. awake, alert, oriented    Very pleasant and calm  Eyes: PERRL. No sclera icterus. No conjunctival injection. ENT: No discharge. Pharynx clear. Neck: Trachea midline. Resp: No accessory muscle use. No crackles. No wheezes. No rhonchi. CV: Regular rate. Regular rhythm. No murmur. No rub. No edema. Capillary Refill: Brisk,< 3 seconds   Peripheral Pulses: +2 palpable, equal bilaterally   GI: Non-tender. Non-distended. Normal bowel sounds. No hernia. Skin: Warm and dry. No nodule on exposed extremities. No rash on exposed extremities. M/S: No cyanosis. No joint deformity. No clubbing. Neuro: Awake. Grossly nonfocal    Psych: Oriented x 3.  No anxiety or agitation      Data:  CBC:   Recent Labs     12/04/20  1046 12/05/20  0701 12/06/20  0624   WBC 23.8* 19.8* 13.1*   HGB 6.1* 7.4* 7.2*   HCT 18.1* 21.9* 22.1*   MCV 97.7 95.0 98.0    382 340     BMP:   Recent Labs     12/05/20  1500 12/06/20  0624    141   K 3.9 4.6    108   CO2 24 22   BUN 7 8   CREATININE 0.6* 0.6*     LIVER PROFILE:   Recent Labs 12/05/20  1500   AST 44*   ALT 34   BILIDIR 2.4*   BILITOT 4.2*   ALKPHOS 129     PT/INR: No results for input(s): PROTIME, INR in the last 72 hours. CULTURES  Blood: NGTD  Rapid COVID: not detected  COVID PCR: not detectected  Sputum: ordered  Legionella ag: neg  Strep pneumoniae ag: neg        RADIOLOGY  CT CHEST PULMONARY EMBOLISM W CONTRAST   Final Result   No large central filling defect in the pulmonary arteries however evaluation   of lobar and subsegmental pulmonary arteries is nondiagnostic due to poor   contrast bolus. Parenchymal changes as above concerning for infection. XR CHEST PORTABLE   Final Result   New mild left basilar airspace disease which may be related to atelectasis   versus pneumonia. XR CHEST PORTABLE   Final Result   1. No radiographic evidence of lung consolidation. 2. Mild cardiomegaly. Assessment/Plan:    #Sickle cell pain crisis  # sickle cell anemia  - patient presented with chest and upper back pain.  evaluated with CT Chest (neg for PE- did show GGO bilaterally- see below)  - hem/onc consulted  - aggressive IVF administration  - Folic acid, Hydrea  - supplemental O2  - pain control- dilaudid PCA started 12/5->continue  - PRBC transfusion on 12/2 and 12/4   - Lovenox for DVT ppx   - appreciate pulm input- solumedrol added     #Abnormal chest CT- bilateral GGO  #Leucocytosis  #Fever  - bilateral GGO. Concern for bacterial or viral PNA, or acute chest syndrome  - he received 2 days of azithromycin, now on Levaquin 500 mg D#5  - rapid COVID 19 test negative, PCR neg  - f/u cultures  - appreciate pulmonary input - solumedrol added   - cont IBD  - leukocytosis improving . 25 K-> 23K-> 19K-> 13K    #Acute Hypoxia  - 2/2 above   - on 3 L -> 1L O2. Wean as tolerated.      #Asthma  - inhalers    DVT Prophylaxis: Lovenox  Diet: DIET GENERAL;  Code Status: Full Code     Bob Villalobos MD  12/6/2020

## 2020-12-06 NOTE — PROGRESS NOTES
Sleeping most of day so far. Easily arousable. States he stayed up all night talking to friends on phone. Encouraged out of bed to chair and deep breathing exercises.

## 2020-12-06 NOTE — FLOWSHEET NOTE
12/05/20 1940   Vital Signs   Temp 99.6 °F (37.6 °C)   Temp Source Oral   Pulse 87   Heart Rate Source Monitor   Resp 18   /60   BP Location Left upper arm   Patient Position Semi fowlers   Level of Consciousness Alert (0)   MEWS Score 1   Patient Currently in Pain Yes   Pain Assessment   Pain Assessment 0-10   Pain Level 4   Pain Type Acute pain   Pain Location Generalized   Oxygen Therapy   SpO2 99 %   O2 Device Nasal cannula   O2 Flow Rate (L/min) 3 L/min   Pt awake/alert and oriented. Laughing while watching something on his phone.  Brandt Loya

## 2020-12-06 NOTE — FLOWSHEET NOTE
12/06/20 0058   Vital Signs   Temp 101.6 °F (38.7 °C)   Temp Source Oral   Pulse 60   Heart Rate Source Monitor   Resp 16   BP (!) 121/58   Level of Consciousness Alert (0)   MEWS Score 3   Oxygen Therapy   SpO2 100 %   O2 Device Nasal cannula   O2 Flow Rate (L/min) 3 L/min   Will medicate pt with tylenol for fever.  Rebecca Verduzco

## 2020-12-06 NOTE — PROGRESS NOTES
ONCOLOGY FOLLOW-UP:       Primary Oncologist:  Dr. 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 Transfused PRBC 12/2, 12/4. Deb Graven He has still had some fevers overnight. In respiratory isolation now as COVID PCR sent. Now on dilaudid PCA. REVIEW OF SYSTEMS:       10 point ROS completed. Pertinent positives in HPI, otherwise negative.      PHYSICAL EXAM:       Vitals:    12/06/20 0753   BP:    Pulse:    Resp: 16   Temp:    SpO2: 97%       General appearance: alert and cooperative  Head: Normocephalic, without obvious abnormality, atraumatic  Neck: No palpable lymphadenopathy in supraclavicular or cervical chains  Lungs: Clear to auscultation bilaterally, no audible rales, wheezes or crackles  Heart: Regular rate and rhythm, S1, S2 normal  Abdomen: Soft, non-tender; bowel sounds normal; no masses,  no organomegaly  Extremities: without cyanosis, clubbing, edema or asymmetry  Skin: No jaundice, purpura or petechiae      LABS:     Lab Results   Component Value Date    WBC 13.1 (H) 12/06/2020    HGB 7.2 (L) 12/06/2020    HCT 22.1 (L) 12/06/2020    MCV 98.0 12/06/2020     12/06/2020       Lab Results   Component Value Date    GLUCOSE 85 12/06/2020    BUN 8 12/06/2020    CREATININE 0.6 (L) 12/06/2020    K 4.6 12/06/2020       Lab Results   Component Value Date    ALKPHOS 129 12/05/2020    ALT 34 12/05/2020    AST 44 (H) 12/05/2020    BILITOT 4.2 (H) 12/05/2020    BILIDIR 2.4 (H) 12/05/2020    PROT 6.8 12/05/2020       IMAGING:     Xr Chest Portable  Result Date: 12/1/2020  1. No radiographic evidence of lung consolidation. 2. Mild cardiomegaly. CTPA 12/3/2020  No large central filling defect in the pulmonary arteries however evaluation    of lobar and subsegmental pulmonary arteries is nondiagnostic due to poor    contrast bolus.         Parenchymal changes as above concerning for infection. ASSESSMENT:     Problem List Items Addressed This Visit     Sickle cell pain crisis ([address] - Primary                PLAN:     1.  Sickle Cell Pain Crisis  - pain in chest and upper back different than before   - low grade fever x 1  - continue IVF and IV analgesics for acute crisis   - switched to dilaudid PCA given minimal improvement in symptoms - will start 0.5 mg/ht basal rate with 0.2 prn q 20 min  - continue IV  toradol  - continue hydrea/folate  - transfused 1 unit PRBC with Hgb < 7 12/2, 12/4 - Hgb 7.2 this AM  - day 4 levaquin for low grade fevers - blood cx NGTD, infectious changes on chest CT, COVID PCR pending  - monitor daily CBC and hemolytic parameters  - CTPA showed no PE  - will continue bronchodilators  - pulmonary assistance appreciated    Modesta Monroe MD

## 2020-12-06 NOTE — FLOWSHEET NOTE
12/05/20 2222   Vital Signs   Temp 99.5 °F (37.5 °C)   Temp Source Oral   Pulse 95   Resp 16   BP (!) 107/59   BP Location Left upper arm   Level of Consciousness Alert (0)   MEWS Score 1   Oxygen Therapy   SpO2 100 %   O2 Device Nasal cannula   O2 Flow Rate (L/min) 3 L/min   End Tidal CO2 41 (%)   Pt's IV to RAC occluded and won't flush. Attempted to look for another IV with use of vein finder with no luck. Pt given box lunch at this time since we can't reheat his dinner that has been in the room. Explained to pt that since he was in isolation I can't bring out his food to the microwave. Contacted another RN for a new IV site.  [employer]

## 2020-12-06 NOTE — FLOWSHEET NOTE
12/06/20 0522   Vital Signs   Temp 98 °F (36.7 °C)   Temp Source Oral   Pulse 100   Heart Rate Source Monitor   Resp 16   BP 98/60   BP Location Right upper arm   Patient Position Semi fowlers   Level of Consciousness Alert (0)   MEWS Score 2   Oxygen Therapy   SpO2 98 %   O2 Device Nasal cannula   O2 Flow Rate (L/min) 3 L/min   Pt resting in bed, no acute distress. Resp easy/even.  Erminio Labs

## 2020-12-06 NOTE — PROGRESS NOTES
ONCOLOGY FOLLOW-UP:       Primary Oncologist:  Dr. Guilherme Chow:       Patient Active Problem List   Diagnosis Code    Sickle cell pain crisis ([facility] [address] D57.00    Asthma J45.909    Sickle cell crisis ([facility] [address] D57.00    Sepsis ([facility] [address] A41.9    [facility] T40.601A    Opiate antagonist poisoning, accidental or unintentional, initial encounter T50.7X1A    Leukocytosis D72.829    Hypoxia R09.02    Anemia D64.9    Other chest pain R07.89    Pneumonia due to infectious organism J18.9       INTERVAL HISTORY:       Pt remains admitted. Transfused PRBC 12/2, 12/4. [place] He has still had some fevers overnight. Now in respiratory isolation as COVID-19 PCR sent. Dilaudid PCA started 12/5/2020. Pain has improved some today. REVIEW OF SYSTEMS:       10 point ROS completed. Pertinent positives in HPI, otherwise negative.      PHYSICAL EXAM:       Vitals:    12/06/20 0753   BP:    Pulse:    Resp: 16   Temp:    SpO2: 97%       General appearance: alert and cooperative  Head: Normocephalic, without obvious abnormality, atraumatic  Neck: No palpable lymphadenopathy in supraclavicular or cervical chains  Lungs: Clear to auscultation bilaterally, no audible rales, wheezes or crackles  Heart: Regular rate and rhythm, S1, S2 normal  Abdomen: Soft, non-tender; bowel sounds normal; no masses,  no organomegaly  Extremities: without cyanosis, clubbing, edema or asymmetry  Skin: No jaundice, purpura or petechiae      LABS:     Lab Results   Component Value Date    WBC 13.1 (H) 12/06/2020    HGB 7.2 (L) 12/06/2020    HCT 22.1 (L) 12/06/2020    MCV 98.0 12/06/2020     12/06/2020       Lab Results   Component Value Date    GLUCOSE 85 12/06/2020    BUN 8 12/06/2020    CREATININE 0.6 (L) 12/06/2020    K 4.6 12/06/2020       Lab Results   Component Value Date    ALKPHOS 129 12/05/2020    ALT 34 12/05/2020    AST 44 (H) 12/05/2020    BILITOT 4.2 (H) 12/05/2020    BILIDIR 2.4 (H) 12/05/2020    PROT 6.8 12/05/2020 IMAGING:     Xr Chest Portable  Result Date: 12/1/2020  1. No radiographic evidence of lung consolidation. 2. Mild cardiomegaly. CTPA 12/3/2020  No large central filling defect in the pulmonary arteries however evaluation    of lobar and subsegmental pulmonary arteries is nondiagnostic due to poor    contrast bolus.         Parenchymal changes as above concerning for infection. ASSESSMENT:     Problem List Items Addressed This Visit     Sickle cell pain crisis ([address] - Primary                PLAN:     1.  Sickle Cell Pain Crisis  - pain in chest and upper back different than before   - low grade fever x 1  - continue IVF and IV analgesics for acute crisis   - switched to Dilaudid PCA given minimal improvement in symptoms - will start 0.5 mg/ht basal rate with 0.2 prn q 20 min  - continue IV  toradol  - continue hydrea/folate  - transfused 1 unit PRBC with Hgb < 7 12/2, 12/4 - Hgb 7.4 this AM  - day 4 levaquin for low grade fevers - blood cx NGTD, infectious changes on chest CT, COVID PCR pending  - monitor daily CBC and hemolytic parameters  - CTPA showed no PE  - will continue bronchodilators  - appreciate pulmonary assistance    Harjinder Gutierrez MD

## 2020-12-06 NOTE — PROGRESS NOTES
Sleeping, but easily arousable. States he stayed up late last night. Says pain is more controlled with pca dilaudid. Afebrile this am. Will continue to monitor.

## 2020-12-07 LAB
ANION GAP SERPL CALCULATED.3IONS-SCNC: 9 MMOL/L (ref 3–16)
BASOPHILS ABSOLUTE: 0.1 K/UL (ref 0–0.2)
BASOPHILS RELATIVE PERCENT: 1.2 %
BUN BLDV-MCNC: 8 MG/DL (ref 7–20)
CALCIUM SERPL-MCNC: 9.8 MG/DL (ref 8.3–10.6)
CHLORIDE BLD-SCNC: 104 MMOL/L (ref 99–110)
CO2: 27 MMOL/L (ref 21–32)
CREAT SERPL-MCNC: 0.6 MG/DL (ref 0.9–1.3)
CULTURE, RESPIRATORY: NORMAL
EOSINOPHILS ABSOLUTE: 0.3 K/UL (ref 0–0.6)
EOSINOPHILS RELATIVE PERCENT: 2.7 %
GFR AFRICAN AMERICAN: >60
GFR NON-AFRICAN AMERICAN: >60
GLUCOSE BLD-MCNC: 103 MG/DL (ref 70–99)
GRAM STAIN RESULT: NORMAL
HAPTOGLOBIN: 64 MG/DL (ref 30–200)
HCT VFR BLD CALC: 20.6 % (ref 40.5–52.5)
HEMOGLOBIN: 7 G/DL (ref 13.5–17.5)
IMMATURE RETIC FRACT: 0.52 (ref 0.21–0.37)
LACTATE DEHYDROGENASE: 426 U/L (ref 100–190)
LYMPHOCYTES ABSOLUTE: 2.3 K/UL (ref 1–5.1)
LYMPHOCYTES RELATIVE PERCENT: 22.7 %
MCH RBC QN AUTO: 32.5 PG (ref 26–34)
MCHC RBC AUTO-ENTMCNC: 33.7 G/DL (ref 31–36)
MCV RBC AUTO: 96.3 FL (ref 80–100)
MONOCYTES ABSOLUTE: 1.4 K/UL (ref 0–1.3)
MONOCYTES RELATIVE PERCENT: 14.4 %
NEUTROPHILS ABSOLUTE: 5.9 K/UL (ref 1.7–7.7)
NEUTROPHILS RELATIVE PERCENT: 59 %
PDW BLD-RTO: 19.6 % (ref 12.4–15.4)
PLATELET # BLD: 461 K/UL (ref 135–450)
PMV BLD AUTO: 8.1 FL (ref 5–10.5)
POTASSIUM REFLEX MAGNESIUM: 4.3 MMOL/L (ref 3.5–5.1)
RBC # BLD: 2.14 M/UL (ref 4.2–5.9)
RETICULOCYTE ABSOLUTE COUNT: 0.14 M/UL
RETICULOCYTE COUNT PCT: 6.7 % (ref 0.5–2.18)
SODIUM BLD-SCNC: 140 MMOL/L (ref 136–145)
WBC # BLD: 10 K/UL (ref 4–11)

## 2020-12-07 PROCEDURE — 36415 COLL VENOUS BLD VENIPUNCTURE: CPT

## 2020-12-07 PROCEDURE — 83615 LACTATE (LD) (LDH) ENZYME: CPT

## 2020-12-07 PROCEDURE — 6360000002 HC RX W HCPCS: Performed by: INTERNAL MEDICINE

## 2020-12-07 PROCEDURE — 83010 ASSAY OF HAPTOGLOBIN QUANT: CPT

## 2020-12-07 PROCEDURE — 94770 HC ETCO2 MONITOR DAILY: CPT

## 2020-12-07 PROCEDURE — 85025 COMPLETE CBC W/AUTO DIFF WBC: CPT

## 2020-12-07 PROCEDURE — 80048 BASIC METABOLIC PNL TOTAL CA: CPT

## 2020-12-07 PROCEDURE — 2580000003 HC RX 258: Performed by: INTERNAL MEDICINE

## 2020-12-07 PROCEDURE — 99232 SBSQ HOSP IP/OBS MODERATE 35: CPT | Performed by: INTERNAL MEDICINE

## 2020-12-07 PROCEDURE — 1200000000 HC SEMI PRIVATE

## 2020-12-07 PROCEDURE — 99233 SBSQ HOSP IP/OBS HIGH 50: CPT | Performed by: INTERNAL MEDICINE

## 2020-12-07 PROCEDURE — 6370000000 HC RX 637 (ALT 250 FOR IP): Performed by: INTERNAL MEDICINE

## 2020-12-07 PROCEDURE — 85045 AUTOMATED RETICULOCYTE COUNT: CPT

## 2020-12-07 RX ADMIN — ACETAMINOPHEN 1000 MG: 500 TABLET ORAL at 21:55

## 2020-12-07 RX ADMIN — PANTOPRAZOLE SODIUM 40 MG: 40 TABLET, DELAYED RELEASE ORAL at 09:22

## 2020-12-07 RX ADMIN — Medication 10 ML: at 19:55

## 2020-12-07 RX ADMIN — SODIUM CHLORIDE: 9 INJECTION, SOLUTION INTRAVENOUS at 09:22

## 2020-12-07 RX ADMIN — FOLIC ACID 2 MG: 1 TABLET ORAL at 09:22

## 2020-12-07 RX ADMIN — HYDROXYUREA 1000 MG: 500 CAPSULE ORAL at 09:23

## 2020-12-07 RX ADMIN — HYDROMORPHONE HYDROCHLORIDE 1 MG: 1 INJECTION, SOLUTION INTRAMUSCULAR; INTRAVENOUS; SUBCUTANEOUS at 22:34

## 2020-12-07 RX ADMIN — Medication 10 MG: at 09:50

## 2020-12-07 RX ADMIN — OXYCODONE 10 MG: 5 TABLET ORAL at 19:55

## 2020-12-07 RX ADMIN — LEVOFLOXACIN 500 MG: 500 TABLET, FILM COATED ORAL at 09:22

## 2020-12-07 RX ADMIN — OXYCODONE 10 MG: 5 TABLET ORAL at 13:45

## 2020-12-07 RX ADMIN — HYDROXYUREA 1000 MG: 500 CAPSULE ORAL at 19:55

## 2020-12-07 RX ADMIN — ACETAMINOPHEN 1000 MG: 500 TABLET ORAL at 13:45

## 2020-12-07 ASSESSMENT — PAIN DESCRIPTION - DESCRIPTORS: DESCRIPTORS: ACHING

## 2020-12-07 ASSESSMENT — PAIN SCALES - GENERAL
PAINLEVEL_OUTOF10: 6
PAINLEVEL_OUTOF10: 6
PAINLEVEL_OUTOF10: 4
PAINLEVEL_OUTOF10: 5
PAINLEVEL_OUTOF10: 7
PAINLEVEL_OUTOF10: 7

## 2020-12-07 ASSESSMENT — PAIN DESCRIPTION - ONSET: ONSET: ON-GOING

## 2020-12-07 ASSESSMENT — PAIN DESCRIPTION - PAIN TYPE: TYPE: ACUTE PAIN;CHRONIC PAIN

## 2020-12-07 ASSESSMENT — PAIN DESCRIPTION - FREQUENCY: FREQUENCY: CONTINUOUS

## 2020-12-07 ASSESSMENT — PAIN DESCRIPTION - PROGRESSION: CLINICAL_PROGRESSION: GRADUALLY IMPROVING

## 2020-12-07 ASSESSMENT — PAIN DESCRIPTION - LOCATION: LOCATION: GENERALIZED

## 2020-12-07 NOTE — FLOWSHEET NOTE
12/06/20 1902   Vital Signs   Temp 101.6 °F (38.7 °C)   Temp Source Oral   Pulse 100   Heart Rate Source Monitor   Resp 18   /68   BP Location Right lower arm   Patient Position Semi fowlers   Level of Consciousness Alert (0)   MEWS Score 3   Oxygen Therapy   SpO2 98 %   O2 Device Nasal cannula   O2 Flow Rate (L/min) 1 L/min   Tylenol 2 po given to pt for fever. Pt's 02 turned off, saturation now 94% on room air.  Deonna Block

## 2020-12-07 NOTE — PLAN OF CARE
Problem: Daily Care:  Goal: Daily care needs are met  Description: Daily care needs are met  Outcome: Ongoing     Problem: Pain:  Goal: Patient's pain/discomfort is manageable  Description: Patient's pain/discomfort is manageable  Outcome: Ongoing

## 2020-12-07 NOTE — FLOWSHEET NOTE
12/07/20 0233   Vital Signs   Temp 97.4 °F (36.3 °C)   Temp Source Oral   Pulse 87   Heart Rate Source Monitor   Resp 18   BP (!) 95/53   BP Location Right upper arm   Patient Position Semi fowlers   Level of Consciousness Alert (0)   MEWS Score 2   Oxygen Therapy   SpO2 97 %   O2 Device None (Room air)   Pt resting in bed, no acute distress.  Abbey Cohen

## 2020-12-07 NOTE — PROGRESS NOTES
Progress Note    Admit Date:  12/1/2020    23 y.o. male who presented to the hospital with a chief complaint of uncontrolled chest and upper back pain. The patient has a history of sickle cell anemia and is on chronic narcotic therapy for pain as needed. admitted with sickle cell crises      COVID PCR neg   legionella Ag neg   strep pneumo ag neg     Subjective:  Mr. Viktoria Mascorro is feeling better today,  pain is more controlled. willing to stop the Dilaudid PCA and try oral pain medication. Hemoglobin dropped to 7.0; hematologist is ordered on 1 unit of PRBC today. He had fevers yesterday . afebrile today  Oxygen saturation stable on 1 L    Objective:      Vitals:    12/06/20 2313 12/06/20 2319 12/07/20 0233 12/07/20 0713   BP: (!) 94/51  (!) 95/53 (!) 101/55   Pulse: 93  87 100   Resp: 18  18 20   Temp: 99.8 °F (37.7 °C) 99 °F (37.2 °C) 97.4 °F (36.3 °C) 98.9 °F (37.2 °C)   TempSrc: Oral  Oral Oral   SpO2: 95%  97% 96%   Weight:       Height:                Intake/Output Summary (Last 24 hours) at 12/7/2020 1141  Last data filed at 12/7/2020 0278  Gross per 24 hour   Intake 841.02 ml   Output 3150 ml   Net -2308.98 ml       Physical Exam:  Gen: Young AA male. Not in distress. awake, alert, oriented  Eyes: PERRL. No sclera icterus. No conjunctival injection. ENT: No discharge. Pharynx clear. Neck: Trachea midline. Resp: No accessory muscle use. No crackles. No wheezes. No rhonchi. CV: Regular rate. Regular rhythm. No murmur. No rub. No edema. Capillary Refill: Brisk,< 3 seconds   Peripheral Pulses: +2 palpable, equal bilaterally   GI: Non-tender. Non-distended. Normal bowel sounds. No hernia. Skin: Warm and dry. No nodule on exposed extremities. No rash on exposed extremities. M/S: No cyanosis. No joint deformity. No clubbing. Neuro: Awake. Grossly nonfocal    Psych: Oriented x 3.  No anxiety or agitation      Data:  CBC:   Recent Labs     12/05/20  0701 12/06/20  0624 12/07/20  0619   WBC 19.8* 13.1* 10.0   HGB 7.4* 7.2* 7.0*   HCT 21.9* 22.1* 20.6*   MCV 95.0 98.0 96.3    340 461*     BMP:   Recent Labs     12/05/20  1500 12/06/20  0624 12/07/20  0619    141 140   K 3.9 4.6 4.3    108 104   CO2 24 22 27   BUN 7 8 8   CREATININE 0.6* 0.6* 0.6*     LIVER PROFILE:   Recent Labs     12/05/20  1500   AST 44*   ALT 34   BILIDIR 2.4*   BILITOT 4.2*   ALKPHOS 129     PT/INR: No results for input(s): PROTIME, INR in the last 72 hours. CULTURES  Blood: NGTD  Rapid COVID: not detected  COVID PCR: not detectected  Sputum: ordered  Legionella ag: neg  Strep pneumoniae ag: neg        RADIOLOGY  CT CHEST PULMONARY EMBOLISM W CONTRAST   Final Result   No large central filling defect in the pulmonary arteries however evaluation   of lobar and subsegmental pulmonary arteries is nondiagnostic due to poor   contrast bolus. Parenchymal changes as above concerning for infection. XR CHEST PORTABLE   Final Result   New mild left basilar airspace disease which may be related to atelectasis   versus pneumonia. XR CHEST PORTABLE   Final Result   1. No radiographic evidence of lung consolidation. 2. Mild cardiomegaly. Assessment/Plan:    #Sickle cell pain crisis  # sickle cell anemia  - patient presented with chest and upper back pain.  evaluated with CT Chest (neg for PE- did show GGO bilaterally- see below)  - hem/onc consulted  - aggressive IVF administration  - Folic acid, Hydrea  - supplemental O2  - pain control- dilaudid PCA started 12/5->continue  - PRBC transfusion on 12/2 and 12/4   - Lovenox for DVT ppx   - appreciate pulm input- solumedrol added   Pain is better controlled today-> plan is to discontinue Dilaudid PCA, continue oxycodone as needed for pain control and IV Dilaudid as needed for breakthrough pain. hopefully discharge home tomorrow.    Hemoglobin down to 7.0 patient will receive 1 more unit of PRBC today    #Abnormal chest CT- bilateral GGO  #Leucocytosis  #Fever  - bilateral GGO. Concern for bacterial or viral PNA, or acute chest syndrome  - he received 2 days of azithromycin, now on Levaquin 500 mg D#5  - rapid COVID 19 test negative, PCR neg  - f/u cultures  - appreciate pulmonary input - solumedrol added   - cont IBD  - leukocytosis improving . 25 K-> 23K-> 19K-> 13K-> 10K    #Acute Hypoxia  - 2/2 above   - on 3 L -> 1L O2. Wean as tolerated.      #Asthma  - inhalers    DVT Prophylaxis: Lovenox  Diet: DIET GENERAL;  Code Status: Full Code     Likely DC for tomorrow     Kartik Walker MD  12/7/2020

## 2020-12-07 NOTE — PROGRESS NOTES
Dilaudid PCA pump syringe changed. Settings confirmed with second RN Margarita Padron RN. Wasted 5 ml of Dilaudid in old syringe.  Star Bowens

## 2020-12-07 NOTE — FLOWSHEET NOTE
12/07/20 0713   Vital Signs   Temp 98.9 °F (37.2 °C)   Temp Source Oral   Pulse 100   Heart Rate Source Monitor   Resp 20   BP (!) 101/55   BP Location Right upper arm   Patient Position Semi fowlers   Level of Consciousness Alert (0)   MEWS Score 1   Oxygen Therapy   SpO2 96 %   O2 Device None (Room air)   AM assessment completed, see flow sheet. Pt is alert and oriented. BP low. Respirations are even & easy. No complaints voiced. Pt denies needs at this time. SR up x 2, and bed in low position. Call light is within reach. Pt refused solu-mederal/lovenox.

## 2020-12-08 ENCOUNTER — APPOINTMENT (OUTPATIENT)
Dept: GENERAL RADIOLOGY | Age: 20
DRG: 811 | End: 2020-12-08
Payer: COMMERCIAL

## 2020-12-08 LAB
ANION GAP SERPL CALCULATED.3IONS-SCNC: 13 MMOL/L (ref 3–16)
ANISOCYTOSIS: ABNORMAL
ATYPICAL LYMPHOCYTE RELATIVE PERCENT: 4 % (ref 0–6)
BACTERIA: ABNORMAL /HPF
BASOPHILS ABSOLUTE: 0 K/UL (ref 0–0.2)
BASOPHILS RELATIVE PERCENT: 0 %
BILIRUBIN URINE: ABNORMAL
BLOOD, URINE: NEGATIVE
BUN BLDV-MCNC: 9 MG/DL (ref 7–20)
CALCIUM SERPL-MCNC: 9.7 MG/DL (ref 8.3–10.6)
CHLORIDE BLD-SCNC: 100 MMOL/L (ref 99–110)
CLARITY: CLEAR
CO2: 24 MMOL/L (ref 21–32)
COLOR: YELLOW
CREAT SERPL-MCNC: 0.9 MG/DL (ref 0.9–1.3)
EOSINOPHILS ABSOLUTE: 0.2 K/UL (ref 0–0.6)
EOSINOPHILS RELATIVE PERCENT: 2 %
EPITHELIAL CELLS, UA: ABNORMAL /HPF (ref 0–5)
GFR AFRICAN AMERICAN: >60
GFR NON-AFRICAN AMERICAN: >60
GLUCOSE BLD-MCNC: 121 MG/DL (ref 70–99)
GLUCOSE URINE: NEGATIVE MG/DL
HAPTOGLOBIN: 46 MG/DL (ref 30–200)
HEMATOLOGY PATH CONSULT: NO
HEMOGLOBIN: 8 G/DL (ref 13.5–17.5)
HYPOCHROMIA: ABNORMAL
KETONES, URINE: NEGATIVE MG/DL
LACTATE DEHYDROGENASE: 408 U/L (ref 100–190)
LACTIC ACID: 1.2 MMOL/L (ref 0.4–2)
LEUKOCYTE ESTERASE, URINE: NEGATIVE
LYMPHOCYTES ABSOLUTE: 1.7 K/UL (ref 1–5.1)
LYMPHOCYTES RELATIVE PERCENT: 18 %
MCH RBC QN AUTO: 33.8 PG (ref 26–34)
MCHC RBC AUTO-ENTMCNC: 34.1 G/DL (ref 31–36)
MCV RBC AUTO: 99.2 FL (ref 80–100)
MICROSCOPIC EXAMINATION: YES
MONOCYTES ABSOLUTE: 1 K/UL (ref 0–1.3)
MONOCYTES RELATIVE PERCENT: 13 %
NEUTROPHILS ABSOLUTE: 4.8 K/UL (ref 1.7–7.7)
NEUTROPHILS RELATIVE PERCENT: 63 %
NITRITE, URINE: NEGATIVE
NUCLEATED RED BLOOD CELLS: 3 /100 WBC
PDW BLD-RTO: 19 % (ref 12.4–15.4)
PH UA: 7.5 (ref 5–8)
PLATELET # BLD: 648 K/UL (ref 135–450)
PLATELET SLIDE REVIEW: ABNORMAL
PMV BLD AUTO: 8.4 FL (ref 5–10.5)
POIKILOCYTES: ABNORMAL
POLYCHROMASIA: ABNORMAL
POTASSIUM REFLEX MAGNESIUM: 3.9 MMOL/L (ref 3.5–5.1)
PROTEIN UA: ABNORMAL MG/DL
RBC # BLD: 2.36 M/UL (ref 4.2–5.9)
RBC UA: ABNORMAL /HPF (ref 0–4)
SLIDE REVIEW: ABNORMAL
SODIUM BLD-SCNC: 137 MMOL/L (ref 136–145)
SPECIFIC GRAVITY UA: 1.01 (ref 1–1.03)
URINE TYPE: ABNORMAL
UROBILINOGEN, URINE: 4 E.U./DL
WBC # BLD: 7.6 K/UL (ref 4–11)
WBC UA: ABNORMAL /HPF (ref 0–5)

## 2020-12-08 PROCEDURE — 2580000003 HC RX 258: Performed by: INTERNAL MEDICINE

## 2020-12-08 PROCEDURE — 87086 URINE CULTURE/COLONY COUNT: CPT

## 2020-12-08 PROCEDURE — 81001 URINALYSIS AUTO W/SCOPE: CPT

## 2020-12-08 PROCEDURE — 6370000000 HC RX 637 (ALT 250 FOR IP): Performed by: INTERNAL MEDICINE

## 2020-12-08 PROCEDURE — 6360000002 HC RX W HCPCS: Performed by: INTERNAL MEDICINE

## 2020-12-08 PROCEDURE — 36415 COLL VENOUS BLD VENIPUNCTURE: CPT

## 2020-12-08 PROCEDURE — 99233 SBSQ HOSP IP/OBS HIGH 50: CPT | Performed by: INTERNAL MEDICINE

## 2020-12-08 PROCEDURE — 80048 BASIC METABOLIC PNL TOTAL CA: CPT

## 2020-12-08 PROCEDURE — 85025 COMPLETE CBC W/AUTO DIFF WBC: CPT

## 2020-12-08 PROCEDURE — 87040 BLOOD CULTURE FOR BACTERIA: CPT

## 2020-12-08 PROCEDURE — 83010 ASSAY OF HAPTOGLOBIN QUANT: CPT

## 2020-12-08 PROCEDURE — 83605 ASSAY OF LACTIC ACID: CPT

## 2020-12-08 PROCEDURE — 83615 LACTATE (LD) (LDH) ENZYME: CPT

## 2020-12-08 PROCEDURE — 85045 AUTOMATED RETICULOCYTE COUNT: CPT

## 2020-12-08 PROCEDURE — 71046 X-RAY EXAM CHEST 2 VIEWS: CPT

## 2020-12-08 PROCEDURE — 1200000000 HC SEMI PRIVATE

## 2020-12-08 RX ORDER — SODIUM CHLORIDE 9 MG/ML
INJECTION, SOLUTION INTRAVENOUS CONTINUOUS
Status: DISCONTINUED | OUTPATIENT
Start: 2020-12-08 | End: 2020-12-09 | Stop reason: HOSPADM

## 2020-12-08 RX ADMIN — OXYCODONE 10 MG: 5 TABLET ORAL at 00:02

## 2020-12-08 RX ADMIN — HYDROMORPHONE HYDROCHLORIDE 1 MG: 1 INJECTION, SOLUTION INTRAMUSCULAR; INTRAVENOUS; SUBCUTANEOUS at 02:34

## 2020-12-08 RX ADMIN — PANTOPRAZOLE SODIUM 40 MG: 40 TABLET, DELAYED RELEASE ORAL at 06:00

## 2020-12-08 RX ADMIN — HYDROXYUREA 1000 MG: 500 CAPSULE ORAL at 08:44

## 2020-12-08 RX ADMIN — OXYCODONE 10 MG: 5 TABLET ORAL at 17:03

## 2020-12-08 RX ADMIN — PIPERACILLIN SODIUM AND TAZOBACTAM SODIUM 3.38 G: 3; .375 INJECTION, POWDER, LYOPHILIZED, FOR SOLUTION INTRAVENOUS at 09:35

## 2020-12-08 RX ADMIN — OXYCODONE 10 MG: 5 TABLET ORAL at 05:59

## 2020-12-08 RX ADMIN — OXYCODONE 10 MG: 5 TABLET ORAL at 23:44

## 2020-12-08 RX ADMIN — SODIUM CHLORIDE: 9 INJECTION, SOLUTION INTRAVENOUS at 09:38

## 2020-12-08 RX ADMIN — VANCOMYCIN HYDROCHLORIDE 1250 MG: 10 INJECTION, POWDER, LYOPHILIZED, FOR SOLUTION INTRAVENOUS at 22:29

## 2020-12-08 RX ADMIN — LEVOFLOXACIN 500 MG: 500 TABLET, FILM COATED ORAL at 08:44

## 2020-12-08 RX ADMIN — METHYLPREDNISOLONE SODIUM SUCCINATE 40 MG: 40 INJECTION, POWDER, FOR SOLUTION INTRAMUSCULAR; INTRAVENOUS at 08:45

## 2020-12-08 RX ADMIN — HYDROXYUREA 1000 MG: 500 CAPSULE ORAL at 19:39

## 2020-12-08 RX ADMIN — ACETAMINOPHEN 650 MG: 325 TABLET ORAL at 08:44

## 2020-12-08 RX ADMIN — HYDROMORPHONE HYDROCHLORIDE 1 MG: 1 INJECTION, SOLUTION INTRAMUSCULAR; INTRAVENOUS; SUBCUTANEOUS at 09:36

## 2020-12-08 RX ADMIN — HYDROMORPHONE HYDROCHLORIDE 1 MG: 1 INJECTION, SOLUTION INTRAMUSCULAR; INTRAVENOUS; SUBCUTANEOUS at 14:33

## 2020-12-08 RX ADMIN — SODIUM CHLORIDE: 9 INJECTION, SOLUTION INTRAVENOUS at 19:39

## 2020-12-08 RX ADMIN — OXYCODONE 10 MG: 5 TABLET ORAL at 11:51

## 2020-12-08 RX ADMIN — PIPERACILLIN SODIUM AND TAZOBACTAM SODIUM 3.38 G: 3; .375 INJECTION, POWDER, LYOPHILIZED, FOR SOLUTION INTRAVENOUS at 17:03

## 2020-12-08 RX ADMIN — ENOXAPARIN SODIUM 40 MG: 40 INJECTION SUBCUTANEOUS at 08:44

## 2020-12-08 RX ADMIN — VANCOMYCIN HYDROCHLORIDE 1250 MG: 10 INJECTION, POWDER, LYOPHILIZED, FOR SOLUTION INTRAVENOUS at 14:33

## 2020-12-08 RX ADMIN — Medication 10 ML: at 08:45

## 2020-12-08 RX ADMIN — HYDROMORPHONE HYDROCHLORIDE 1 MG: 1 INJECTION, SOLUTION INTRAMUSCULAR; INTRAVENOUS; SUBCUTANEOUS at 19:18

## 2020-12-08 RX ADMIN — FOLIC ACID 2 MG: 1 TABLET ORAL at 08:44

## 2020-12-08 ASSESSMENT — PAIN SCALES - GENERAL
PAINLEVEL_OUTOF10: 3
PAINLEVEL_OUTOF10: 4
PAINLEVEL_OUTOF10: 7
PAINLEVEL_OUTOF10: 9
PAINLEVEL_OUTOF10: 7
PAINLEVEL_OUTOF10: 5
PAINLEVEL_OUTOF10: 6
PAINLEVEL_OUTOF10: 7
PAINLEVEL_OUTOF10: 4
PAINLEVEL_OUTOF10: 8
PAINLEVEL_OUTOF10: 9
PAINLEVEL_OUTOF10: 5
PAINLEVEL_OUTOF10: 4
PAINLEVEL_OUTOF10: 9
PAINLEVEL_OUTOF10: 8

## 2020-12-08 ASSESSMENT — PAIN DESCRIPTION - PROGRESSION
CLINICAL_PROGRESSION: GRADUALLY WORSENING

## 2020-12-08 ASSESSMENT — PAIN DESCRIPTION - FREQUENCY
FREQUENCY: INTERMITTENT
FREQUENCY: INTERMITTENT
FREQUENCY: CONTINUOUS
FREQUENCY: INTERMITTENT

## 2020-12-08 ASSESSMENT — PAIN DESCRIPTION - DESCRIPTORS
DESCRIPTORS: SHARP;SHOOTING
DESCRIPTORS: ACHING;SHARP;SHOOTING
DESCRIPTORS: SHARP;SORE
DESCRIPTORS: SHARP;SORE

## 2020-12-08 ASSESSMENT — PAIN DESCRIPTION - LOCATION
LOCATION: GENERALIZED

## 2020-12-08 ASSESSMENT — PAIN DESCRIPTION - ONSET
ONSET: GRADUAL
ONSET: ON-GOING

## 2020-12-08 ASSESSMENT — PAIN - FUNCTIONAL ASSESSMENT
PAIN_FUNCTIONAL_ASSESSMENT: PREVENTS OR INTERFERES SOME ACTIVE ACTIVITIES AND ADLS

## 2020-12-08 ASSESSMENT — PAIN DESCRIPTION - PAIN TYPE
TYPE: ACUTE PAIN;CHRONIC PAIN

## 2020-12-08 NOTE — PROGRESS NOTES
PRN dilaudid at this time. Patient seems anxious and upset that he is in the hospital. Educated patient that it is important that we keep him until he is able to manage his pain at home appropriate.

## 2020-12-08 NOTE — FLOWSHEET NOTE
12/08/20 1445   Encounter Summary   Services provided to: Patient   Referral/Consult From: [facility] Family members   Continue Visiting   (12/8 - Initial visit per rounding)   Complexity of Encounter Moderate   Length of Encounter 45 minutes   Spiritual Assessment Completed Yes   Routine   Type Initial   Outcome Receptive   Spiritual/Hindu   Type Spiritual support   Assessment Approachable; Hopeful;Calm   Intervention Active listening;Explored feelings, thoughts, concerns;Prayer;Sustaining presence/ Ministry of presence  (Discovered through converstion that I knew his family.)   Outcome Connection/belonging;Expressed gratitude;Engaged in conversation;Receptive   Long-term dealing with sickle cell issue. Toward end of conversation as talked with patient about tracey orientation, discovered that I had had his sister as a student in university, and had met both of his parents, who lead ministry together as pastors. Patient has been balancing personal dreams against the constraints of his sickness. Conversation seemed to foster encouragement.

## 2020-12-08 NOTE — PROGRESS NOTES
ONCOLOGY FOLLOW-UP:       Primary Oncologist:  Dr. 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 Transfused PRBC 12/2, 12/4, 12/7. Still has intermittent low grade fever  COVID PCR negative  Dilaudid PCA stopped yesterday. He is resting comfortably this AM.     REVIEW OF SYSTEMS:       10 point ROS completed. Pertinent positives in HPI, otherwise negative.      PHYSICAL EXAM:       Vitals:    12/08/20 0215   BP: (!) 141/71   Pulse: 106   Resp: 16   Temp: 100.6 °F (38.1 °C)   SpO2: 96%       General appearance: alert and cooperative  Head: Normocephalic, without obvious abnormality, atraumatic  Neck: No palpable lymphadenopathy in supraclavicular or cervical chains  Lungs: Clear to auscultation bilaterally, no audible rales, wheezes or crackles  Heart: Regular rate and rhythm, S1, S2 normal  Abdomen: Soft, non-tender; bowel sounds normal; no masses,  no organomegaly  Extremities: without cyanosis, clubbing, edema or asymmetry  Skin: No jaundice, purpura or petechiae      LABS:     Lab Results   Component Value Date    WBC 10.0 12/07/2020    HGB 8.0 (L) 12/08/2020    HCT 23.4 (L) 12/08/2020    MCV 99.2 12/08/2020     (H) 12/07/2020       Lab Results   Component Value Date    GLUCOSE 121 (H) 12/08/2020    BUN 9 12/08/2020    CREATININE 0.9 12/08/2020    K 3.9 12/08/2020       Lab Results   Component Value Date    ALKPHOS 129 12/05/2020    ALT 34 12/05/2020    AST 44 (H) 12/05/2020    BILITOT 4.2 (H) 12/05/2020    BILIDIR 2.4 (H) 12/05/2020    PROT 6.8

## 2020-12-08 NOTE — PROGRESS NOTES
Pulmonary Progress Note    CC: Acute respiratory failure with hypoxemia    Subjective:   Patient feels about the same. Tm101.2F      Intake/Output Summary (Last 24 hours) at 12/8/2020 0639  Last data filed at 12/8/2020 0601  Gross per 24 hour   Intake 10 ml   Output 3900 ml   Net -3890 ml       Exam:   BP (!) 141/71   Pulse 106   Temp 100.6 °F (38.1 °C) (Oral)   Resp 16   Ht 5' 8\" (1.727 m)   Wt 188 lb (85.3 kg)   SpO2 96%   BMI 28.59 kg/m²  on RA  Gen: . Hipolito Monroe man in no acute distress, ncat , comfortable  Eyes: PERRL. No sclera icterus. No conjunctival injection. ENT: No discharge. Pharynx clear. Neck: Trachea midline. No obvious mass. Resp: No accessory muscle use. No crackles. No wheezes. No rhonchi. No dullness on percussion. CV: Regular rate. Regular rhythm. No murmur or rub. No edema. GI:  Non-distended. No hernia. Skin: Warm and dry. No nodule on exposed extremities. Lymph: No cervical LAD. No supraclavicular LAD. M/S: No cyanosis. No joint deformity. No clubbing. Neuro: Awake. Alert. Moves all four extremities.      Scheduled Meds:   methylPREDNISolone  40 mg Intravenous Daily    levoFLOXacin  500 mg Oral Daily    folic acid  2 mg Oral Daily    sodium chloride flush  10 mL Intravenous 2 times per day    enoxaparin  40 mg Subcutaneous Daily    pantoprazole  40 mg Oral QAM AC    hydroxyurea  1,000 mg Oral BID     Continuous Infusions:    PRN Meds:  HYDROmorphone, sodium chloride flush, acetaminophen **OR** acetaminophen, polyethylene glycol, promethazine **OR** ondansetron, acetaminophen, oxyCODONE    Labs:  CBC:   Recent Labs     12/05/20  0701 12/06/20 0624 12/07/20 0619   WBC 19.8* 13.1* 10.0   HGB 7.4* 7.2* 7.0*   HCT 21.9* 22.1* 20.6*   MCV 95.0 98.0 96.3    340 461*     BMP:   Recent Labs     12/05/20  1500 12/06/20 0624 12/07/20 0619    141 140   K 3.9 4.6 4.3    108 104   CO2 24 22 27   BUN 7 8 8   CREATININE 0.6* 0.6* 0.6*     LIVER PROFILE: Recent Labs     12/05/20  1500   AST 44*   ALT 34   BILIDIR 2.4*   BILITOT 4.2*   ALKPHOS 129     PT/INR: No results for input(s): PROTIME, INR in the last 72 hours. APTT: No results for input(s): APTT in the last 72 hours. UA:No results for input(s): NITRITE, COLORU, PHUR, LABCAST, WBCUA, RBCUA, MUCUS, TRICHOMONAS, YEAST, BACTERIA, CLARITYU, SPECGRAV, LEUKOCYTESUR, UROBILINOGEN, BILIRUBINUR, BLOODU, GLUCOSEU, AMORPHOUS in the last 72 hours. Invalid input(s): KETONESU  No results for input(s): PHART, LMZ4HVW, PO2ART in the last 72 hours.   Cultures:   12/1 rapid sars Cov2- neg  12/5 PCR ars Cov2- neg  12/1 blood- ngtd  12/5 sputum NRF      Films:  Chest CTA 12/3: no PE; Lungs/pleura: No pneumothorax.  Patchy ground-glass attenuation posteriorly in the upper lobes.  Scattered ground-glass attenuation and patchy   consolidation within the lower lobes.            ASSESSMENT:  ·  Acute respiratory failure with hypoxemia  · Sickle cell crisis  · Pneumonia versus acute chest syndrome  · Anemia  · Fever- unclear source     PLAN:  · Supplemental oxygen to maintain SaO2 >92%; wean as tolerated   · Hematology following status post blood transfusion  · Abx: Levofloxacin day #7; would change ABX if another fever and re-culture  · Steroids-Solu-Medrol 40 mg IV daily  · Inhaled bronchodilators

## 2020-12-08 NOTE — PROGRESS NOTES
Progress Note    Admit Date:  12/1/2020    23 y.o. male who presented to the hospital with a chief complaint of uncontrolled chest and upper back pain. The patient has a history of sickle cell anemia and is on chronic narcotic therapy for pain as needed. admitted with sickle cell crises      COVID PCR neg   legionella Ag neg   strep pneumo ag neg     Subjective:  12/7  Mr. Lurlean Galeazzi is  feeling better today,  pain is more controlled. willing to stop the Dilaudid PCA and try oral pain medication. Hemoglobin dropped to 7.0; hematologist is ordered on 1 unit of PRBC today. He had fevers yesterday . afebrile today  Oxygen saturation stable on 1 L    12/8/2020   septic    spiked temps    Patient has spiked temperatures to 103 °F this morning,  he is septic tachycardic and tachypneic.   blood pressure is at 104/75. Awake and responding appropriately. pain controlled  No cough or shortness of breath        Objective:      Vitals:    12/07/20 1330 12/07/20 1924 12/08/20 0215 12/08/20 0830   BP: (!) 111/51 (!) 101/57 (!) 141/71 (!) 104/58   Pulse: 115 107 106 114   Resp: 20 18 16 18   Temp: 101.2 °F (38.4 °C) 98.8 °F (37.1 °C) 100.6 °F (38.1 °C) 103 °F (39.4 °C)   TempSrc: Oral Oral Oral Oral   SpO2: 95% 99% 96% 93%   Weight:       Height:                Intake/Output Summary (Last 24 hours) at 12/8/2020 0846  Last data filed at 12/8/2020 0601  Gross per 24 hour   Intake 10 ml   Output 2700 ml   Net -2690 ml       Physical Exam:  Gen: Young AA male. Not in distress. awake, alert, oriented  Eyes: PERRL. No sclera icterus. No conjunctival injection. ENT: No discharge. Pharynx clear. Neck: Trachea midline. Resp: No accessory muscle use. No crackles. No wheezes. No rhonchi. CV: Regular rate. Regular rhythm. No murmur. No rub. No edema. Capillary Refill: Brisk,< 3 seconds   Peripheral Pulses: +2 palpable, equal bilaterally   GI: Non-tender. Non-distended. Normal bowel sounds. No hernia.    Skin: Warm and pulm input- solumedrol added   - Monitor hemoglobin and hematocrit closely   - pain is better controlled . Weaned off of Dilaudid PCA, continue oxycodone as needed for pain control and IV Dilaudid as needed for breakthrough pain. #Sepsis  - Patient now has spiked fevers with tachycardia and tachypnea; meetssepsis criteria  - will increase his IV fluid rate  - repeat all cultures- blood cultures, urine cultures . - check chest x-ray. - he has been getting Levaquin- will change his antibiotics vancomycin and Zosyn  - chest CT - bilateral GGO  - rapid COVID 19 test negative, PCR neg   - leukocytosis improving . 25 K-> 23K-> 19K-> 13K-> 10K--> 7.6 K    #Acute Hypoxia  - 2/2 above   - on 3 L -> 1L O2. Wean as tolerated.      #Asthma  - inhalers, steroids    DVT Prophylaxis: Lovenox  Diet: DIET GENERAL;  Code Status: Full Code        Meg German MD  12/8/2020

## 2020-12-08 NOTE — PROGRESS NOTES
PRN dilaudid given at this time. Patient encouraged to only use oral medications if possible. He states that the pain is too bad this morning and he can not wait until he can have the oral medication.

## 2020-12-09 VITALS
RESPIRATION RATE: 16 BRPM | WEIGHT: 188 LBS | TEMPERATURE: 98.6 F | HEIGHT: 68 IN | DIASTOLIC BLOOD PRESSURE: 57 MMHG | BODY MASS INDEX: 28.49 KG/M2 | OXYGEN SATURATION: 96 % | SYSTOLIC BLOOD PRESSURE: 99 MMHG | HEART RATE: 84 BPM

## 2020-12-09 LAB
ANION GAP SERPL CALCULATED.3IONS-SCNC: 12 MMOL/L (ref 3–16)
ANISOCYTOSIS: ABNORMAL
ATYPICAL LYMPHOCYTE RELATIVE PERCENT: 2 % (ref 0–6)
BASOPHILS ABSOLUTE: 0.1 K/UL (ref 0–0.2)
BASOPHILS RELATIVE PERCENT: 1 %
BUN BLDV-MCNC: 8 MG/DL (ref 7–20)
CALCIUM SERPL-MCNC: 9.4 MG/DL (ref 8.3–10.6)
CHLORIDE BLD-SCNC: 104 MMOL/L (ref 99–110)
CO2: 23 MMOL/L (ref 21–32)
CREAT SERPL-MCNC: 0.9 MG/DL (ref 0.9–1.3)
EOSINOPHILS ABSOLUTE: 0.1 K/UL (ref 0–0.6)
EOSINOPHILS RELATIVE PERCENT: 1 %
GFR AFRICAN AMERICAN: >60
GFR NON-AFRICAN AMERICAN: >60
GLUCOSE BLD-MCNC: 93 MG/DL (ref 70–99)
HAPTOGLOBIN: 22 MG/DL (ref 30–200)
HCT VFR BLD CALC: 23.4 % (ref 40.5–52.5)
HCT VFR BLD CALC: 23.5 % (ref 40.5–52.5)
HEMATOLOGY PATH CONSULT: NO
HEMOGLOBIN: 7.7 G/DL (ref 13.5–17.5)
HYPOCHROMIA: ABNORMAL
IMMATURE RETIC FRACT: 0.69 (ref 0.21–0.37)
IMMATURE RETIC FRACT: 0.71 (ref 0.21–0.37)
LACTATE DEHYDROGENASE: 345 U/L (ref 100–190)
LYMPHOCYTES ABSOLUTE: 1.4 K/UL (ref 1–5.1)
LYMPHOCYTES RELATIVE PERCENT: 11 %
MCH RBC QN AUTO: 32.8 PG (ref 26–34)
MCHC RBC AUTO-ENTMCNC: 32.8 G/DL (ref 31–36)
MCV RBC AUTO: 99.9 FL (ref 80–100)
MONOCYTES ABSOLUTE: 1.7 K/UL (ref 0–1.3)
MONOCYTES RELATIVE PERCENT: 16 %
NEUTROPHILS ABSOLUTE: 7.5 K/UL (ref 1.7–7.7)
NEUTROPHILS RELATIVE PERCENT: 69 %
NUCLEATED RED BLOOD CELLS: 1 /100 WBC
PDW BLD-RTO: 19 % (ref 12.4–15.4)
PLATELET # BLD: 680 K/UL (ref 135–450)
PLATELET SLIDE REVIEW: ABNORMAL
PMV BLD AUTO: 8.3 FL (ref 5–10.5)
POIKILOCYTES: ABNORMAL
POLYCHROMASIA: ABNORMAL
POTASSIUM REFLEX MAGNESIUM: 3.9 MMOL/L (ref 3.5–5.1)
RBC # BLD: 2.35 M/UL (ref 4.2–5.9)
RETICULOCYTE ABSOLUTE COUNT: 0.13 M/UL
RETICULOCYTE ABSOLUTE COUNT: 0.14 M/UL
RETICULOCYTE COUNT PCT: 5.42 % (ref 0.5–2.18)
RETICULOCYTE COUNT PCT: 5.72 % (ref 0.5–2.18)
SLIDE REVIEW: ABNORMAL
SODIUM BLD-SCNC: 139 MMOL/L (ref 136–145)
URINE CULTURE, ROUTINE: NORMAL
WBC # BLD: 10.9 K/UL (ref 4–11)

## 2020-12-09 PROCEDURE — 99238 HOSP IP/OBS DSCHRG MGMT 30/<: CPT | Performed by: INTERNAL MEDICINE

## 2020-12-09 PROCEDURE — 6370000000 HC RX 637 (ALT 250 FOR IP): Performed by: INTERNAL MEDICINE

## 2020-12-09 PROCEDURE — 2580000003 HC RX 258: Performed by: INTERNAL MEDICINE

## 2020-12-09 PROCEDURE — 83615 LACTATE (LD) (LDH) ENZYME: CPT

## 2020-12-09 PROCEDURE — 6360000002 HC RX W HCPCS: Performed by: INTERNAL MEDICINE

## 2020-12-09 PROCEDURE — 80048 BASIC METABOLIC PNL TOTAL CA: CPT

## 2020-12-09 PROCEDURE — 85025 COMPLETE CBC W/AUTO DIFF WBC: CPT

## 2020-12-09 PROCEDURE — 85045 AUTOMATED RETICULOCYTE COUNT: CPT

## 2020-12-09 PROCEDURE — 99233 SBSQ HOSP IP/OBS HIGH 50: CPT | Performed by: INTERNAL MEDICINE

## 2020-12-09 PROCEDURE — 83010 ASSAY OF HAPTOGLOBIN QUANT: CPT

## 2020-12-09 PROCEDURE — 36415 COLL VENOUS BLD VENIPUNCTURE: CPT

## 2020-12-09 RX ORDER — OXYCODONE HYDROCHLORIDE 10 MG/1
10 TABLET ORAL EVERY 4 HOURS PRN
Qty: 20 TABLET | Refills: 0 | Status: SHIPPED | OUTPATIENT
Start: 2020-12-09 | End: 2020-12-12

## 2020-12-09 RX ORDER — HYDROXYUREA 500 MG/1
1000 CAPSULE ORAL DAILY
COMMUNITY
Start: 2020-12-09 | End: 2022-10-24

## 2020-12-09 RX ORDER — AMOXICILLIN AND CLAVULANATE POTASSIUM 875; 125 MG/1; MG/1
1 TABLET, FILM COATED ORAL 2 TIMES DAILY
Qty: 20 TABLET | Refills: 0 | Status: SHIPPED | OUTPATIENT
Start: 2020-12-09 | End: 2020-12-19

## 2020-12-09 RX ADMIN — HYDROMORPHONE HYDROCHLORIDE 1 MG: 1 INJECTION, SOLUTION INTRAMUSCULAR; INTRAVENOUS; SUBCUTANEOUS at 08:32

## 2020-12-09 RX ADMIN — VANCOMYCIN HYDROCHLORIDE 1250 MG: 10 INJECTION, POWDER, LYOPHILIZED, FOR SOLUTION INTRAVENOUS at 05:45

## 2020-12-09 RX ADMIN — OXYCODONE 10 MG: 5 TABLET ORAL at 06:08

## 2020-12-09 RX ADMIN — PIPERACILLIN SODIUM AND TAZOBACTAM SODIUM 3.38 G: 3; .375 INJECTION, POWDER, LYOPHILIZED, FOR SOLUTION INTRAVENOUS at 00:47

## 2020-12-09 RX ADMIN — PIPERACILLIN SODIUM AND TAZOBACTAM SODIUM 3.38 G: 3; .375 INJECTION, POWDER, LYOPHILIZED, FOR SOLUTION INTRAVENOUS at 08:31

## 2020-12-09 RX ADMIN — METHYLPREDNISOLONE SODIUM SUCCINATE 40 MG: 40 INJECTION, POWDER, FOR SOLUTION INTRAMUSCULAR; INTRAVENOUS at 08:31

## 2020-12-09 RX ADMIN — ENOXAPARIN SODIUM 40 MG: 40 INJECTION SUBCUTANEOUS at 08:31

## 2020-12-09 RX ADMIN — FOLIC ACID 2 MG: 1 TABLET ORAL at 08:31

## 2020-12-09 RX ADMIN — OXYCODONE 10 MG: 5 TABLET ORAL at 10:46

## 2020-12-09 RX ADMIN — HYDROMORPHONE HYDROCHLORIDE 1 MG: 1 INJECTION, SOLUTION INTRAMUSCULAR; INTRAVENOUS; SUBCUTANEOUS at 00:47

## 2020-12-09 RX ADMIN — PANTOPRAZOLE SODIUM 40 MG: 40 TABLET, DELAYED RELEASE ORAL at 06:08

## 2020-12-09 RX ADMIN — HYDROXYUREA 1000 MG: 500 CAPSULE ORAL at 08:36

## 2020-12-09 ASSESSMENT — PAIN SCALES - GENERAL
PAINLEVEL_OUTOF10: 6
PAINLEVEL_OUTOF10: 7
PAINLEVEL_OUTOF10: 5
PAINLEVEL_OUTOF10: 7
PAINLEVEL_OUTOF10: 7
PAINLEVEL_OUTOF10: 6

## 2020-12-09 NOTE — PLAN OF CARE
Problem: Safety:  Goal: Free from accidental physical injury  Description: Free from accidental physical injury  12/9/2020 0415 by Bandar Pena RN  Outcome: Ongoing  12/9/2020 0414 by Bandar Pena RN  Outcome: Ongoing     Problem: Falls - Risk of:  Goal: Will remain free from falls  Description: Will remain free from falls  12/9/2020 0415 by Bandar Pena RN  Outcome: Ongoing  12/9/2020 0414 by Bandar Pena RN  Outcome: Ongoing  Goal: Absence of physical injury  Description: Absence of physical injury  12/9/2020 0415 by Bandar Pena RN  Outcome: Ongoing  12/9/2020 0414 by Bandar Pena RN  Outcome: Ongoing     Problem: Infection:  Goal: Will remain free from infection  Description: Will remain free from infection  12/9/2020 0415 by Bandar Pena RN  Outcome: Ongoing  12/9/2020 0414 by Bandar Pena RN  Outcome: Ongoing     Problem: Safety:  Goal: Free from accidental physical injury  Description: Free from accidental physical injury  12/9/2020 0415 by Bandar Pena RN  Outcome: Ongoing  12/9/2020 0414 by Bandar Pena RN  Outcome: Ongoing  Goal: Free from intentional harm  Description: Free from intentional harm  12/9/2020 0415 by Bandar Pena RN  Outcome: Ongoing  12/9/2020 0414 by Bandar Pena RN  Outcome: Ongoing     Problem: Daily Care:  Goal: Daily care needs are met  Description: Daily care needs are met  12/9/2020 0415 by Bandar Pena RN  Outcome: Ongoing  12/9/2020 0414 by Bandar Pena RN  Outcome: Ongoing     Problem: Pain:  Goal: Patient's pain/discomfort is manageable  Description: Patient's pain/discomfort is manageable  12/9/2020 0415 by Bandar Pena RN  Outcome: Ongoing  12/9/2020 0414 by Bandar Pena RN  Outcome: Ongoing  Goal: Pain level will decrease  Description: Pain level will decrease  12/9/2020 0415 by Bandar Pena RN  Outcome: Ongoing  12/9/2020 0414 by Bandar Pena RN  Outcome: Ongoing  Goal: Control of acute pain  Description: Control of acute pain  12/9/2020 0415 by Sammy Motta Leim Kawasaki, RN  Outcome: Ongoing  12/9/2020 0414 by Angel Duke RN  Outcome: Ongoing  Goal: Control of chronic pain  Description: Control of chronic pain  12/9/2020 0415 by Angel Duke RN  Outcome: Ongoing  12/9/2020 0414 by Angel Duke RN  Outcome: Ongoing     Problem: Skin Integrity:  Goal: Skin integrity will stabilize  Description: Skin integrity will stabilize  12/9/2020 0415 by Angel Duke RN  Outcome: Ongoing  12/9/2020 0414 by Angel Duke RN  Outcome: Ongoing     Problem: Discharge Planning:  Goal: Patients continuum of care needs are met  Description: Patients continuum of care needs are met  12/9/2020 0415 by Angel Duke RN  Outcome: Ongoing  12/9/2020 0414 by Angel Duke RN  Outcome: Ongoing     Problem: Discharge Planning:  Goal: Discharged to appropriate level of care  Description: Discharged to appropriate level of care  Outcome: Ongoing  Goal: Participates in care planning  Description: Participates in care planning  Outcome: Ongoing     Problem: Airway Clearance - Ineffective:  Goal: Clear lung sounds  Description: Clear lung sounds  Outcome: Ongoing  Goal: Ability to maintain a clear airway will improve  Description: Ability to maintain a clear airway will improve  Outcome: Ongoing     Problem: Fluid Volume - Deficit:  Goal: Achieves intake and output within specified parameters  Description: Achieves intake and output within specified parameters  Outcome: Ongoing     Problem: Gas Exchange - Impaired:  Goal: Levels of oxygenation will improve  Description: Levels of oxygenation will improve  Outcome: Ongoing     Problem: Hyperthermia:  Goal: Ability to maintain a body temperature in the normal range will improve  Description: Ability to maintain a body temperature in the normal range will improve  Outcome: Ongoing     Problem: Tobacco Use:  Goal: Will participate in inpatient tobacco-use cessation counseling  Description: Will participate in inpatient tobacco-use cessation counseling  Outcome: Ongoing

## 2020-12-09 NOTE — CARE COORDINATION
DC home today. No time out was completed. Pt was DC'd in care of his mother earlier today. CM attempted F/U call to patient's mother but was directed to  with no identification, unable to leave secure message to clarify legal guardianship and DCP needs. No needs identified by Karmen Singer.

## 2020-12-09 NOTE — PROGRESS NOTES
Bedside report given to Parag Show, [facility]. Care transferred/       PRN dilaudid given at this time.

## 2020-12-09 NOTE — DISCHARGE SUMMARY
2+*   CLARITYU Clear   SPECGRAV 1.015   LEUKOCYTESUR Negative   UROBILINOGEN 4.0*   BILIRUBINUR SMALL*   BLOODU Negative   GLUCOSEU Negative         CULTURES  Blood: NGTD  Rapid COVID: not detected  COVID PCR: not detected  Sputum: ordered  Legionella ag: neg  Strep pneumoniae ag: neg    RADIOLOGY  XR CHEST (2 VW)   Final Result   Improved appearance of the chest however a small amount of bilateral faint   airspace disease appears to remain present, suggesting persistent multifocal   pneumonia. CT CHEST PULMONARY EMBOLISM W CONTRAST   Final Result   No large central filling defect in the pulmonary arteries however evaluation   of lobar and subsegmental pulmonary arteries is nondiagnostic due to poor   contrast bolus. Parenchymal changes as above concerning for infection. XR CHEST PORTABLE   Final Result   New mild left basilar airspace disease which may be related to atelectasis   versus pneumonia. XR CHEST PORTABLE   Final Result   1. No radiographic evidence of lung consolidation. 2. Mild cardiomegaly. Discharge Medications     Medication List      START taking these medications    amoxicillin-clavulanate 875-125 MG per tablet  Commonly known as:  AUGMENTIN  Take 1 tablet by mouth 2 times daily for 10 days     oxyCODONE HCl 10 MG immediate release tablet  Commonly known as:  OXY-IR  Take 1 tablet by mouth every 4 hours as needed for Pain for up to 3 days.         CHANGE how you take these medications    hydroxyurea 500 MG chemo capsule  Commonly known as:  HYDREA  What changed:    · how much to take  · when to take this  · additional instructions        CONTINUE taking these medications    cyanocobalamin 1000 MCG tablet  Take 1 tablet by mouth daily     folic acid 1 MG tablet  Commonly known as:  FOLVITE  Take 2 tablets by mouth daily        STOP taking these medications    methadone 5 MG tablet  Commonly known as:  DOLOPHINE           Where to Get Your Medications You can get these medications from any pharmacy    Bring a paper prescription for each of these medications  · amoxicillin-clavulanate 875-125 MG per tablet  · oxyCODONE HCl 10 MG immediate release tablet           Discharged in stable condition to home. Follow Up: Follow up with PCP.       Brandy Caballero MD  12/9/2020

## 2020-12-09 NOTE — PROGRESS NOTES
ONCOLOGY FOLLOW-UP:       Primary Oncologist:  Dr. 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 Transfused PRBC 12/2, 12/4, 12/7. COVID PCR negative  Dilaudid PCA stopped 12/7. Switched to [facility] for persistent fever - fevers improved this AM  Pain controlled. Asking when he can go home. REVIEW OF SYSTEMS:       10 point ROS completed. Pertinent positives in HPI, otherwise negative.      PHYSICAL EXAM:       Vitals:    12/09/20 0239   BP: (!) 99/57   Pulse:    Resp:    Temp:    SpO2:        General appearance: alert and cooperative  Head: Normocephalic, without obvious abnormality, atraumatic  Neck: No palpable lymphadenopathy in supraclavicular or cervical chains  Lungs: Clear to auscultation bilaterally, no audible rales, wheezes or crackles  Heart: Regular rate and rhythm, S1, S2 normal  Abdomen: Soft, non-tender; bowel sounds normal; no masses,  no organomegaly  Extremities: without cyanosis, clubbing, edema or asymmetry  Skin: No jaundice, purpura or petechiae      LABS:     Lab Results   Component Value Date    WBC 10.9 12/09/2020    HGB 7.7 (L) 12/09/2020    HCT 23.5 (L) 12/09/2020    MCV 99.9 12/09/2020     (H) 12/09/2020       Lab Results   Component Value Date    GLUCOSE 93 12/09/2020    BUN 8 12/09/2020    CREATININE 0.9 12/09/2020    K 3.9 12/09/2020       Lab Results   Component Value Date    ALKPHOS 129 12/05/2020    ALT 34 12/05/2020    AST 44 (H) 12/05/2020    BILITOT 4.2 (H) 12/05/2020    BILIDIR 2.4 (H) 12/05/2020    PROT 6.8

## 2020-12-09 NOTE — PROGRESS NOTES
6908 AM assessment complete. Pt is awake in bed now watching TV. PRN pain med given see mar. States that he feels better today. IV fluids going now. Pt denies any needs at this time. Independent in the room. The care plan and education has been reviewed and mutually agreed upon with the patient. Patient remains free from falls. All fall precautions in place. Yellow blanket at bedside, yellow bracelet on patient. SAFE sign on door. Bed and chair alarms being used. Bed in lowest position. Will monitor. 1342 pt has been discharged. Home with mother. Denies any questions or concerns at this time.    Ottoniel Dennis

## 2020-12-09 NOTE — PROGRESS NOTES
Pulmonary Progress Note    CC: Acute respiratory failure with hypoxemia    Subjective:   Patient feels better. Br475D yesterday, now afebrile. Intake/Output Summary (Last 24 hours) at 12/9/2020 0711  Last data filed at 12/9/2020 0239  Gross per 24 hour   Intake 1320 ml   Output 1500 ml   Net -180 ml       Exam:   BP (!) 99/57   Pulse 84   Temp 98.6 °F (37 °C) (Oral)   Resp 16   Ht 5' 8\" (1.727 m)   Wt 188 lb (85.3 kg)   SpO2 96%   BMI 28.59 kg/m²  on RA  Gen: . [employer] man in no acute distress, ncat , comfortable  Eyes: PERRL. No sclera icterus. No conjunctival injection. ENT: No discharge. Pharynx clear. Neck: Trachea midline. No obvious mass. Resp: No accessory muscle use. No crackles. No wheezes. No rhonchi. No dullness on percussion. CTAB  CV: Regular rate. Regular rhythm. No murmur or rub. No edema. GI:  Non-distended. No hernia. Skin: Warm and dry. No nodule on exposed extremities. Lymph: No cervical LAD. No supraclavicular LAD. M/S: No cyanosis. No joint deformity. No clubbing. Neuro: Awake. Alert. Moves all four extremities.      Scheduled Meds:   piperacillin-tazobactam  3.375 g Intravenous Q8H    vancomycin  1,250 mg Intravenous Q8H    methylPREDNISolone  40 mg Intravenous Daily    folic acid  2 mg Oral Daily    sodium chloride flush  10 mL Intravenous 2 times per day    enoxaparin  40 mg Subcutaneous Daily    pantoprazole  40 mg Oral QAM AC    hydroxyurea  1,000 mg Oral BID     Continuous Infusions:   sodium chloride 125 mL/hr at 12/08/20 1939     PRN Meds:  HYDROmorphone, sodium chloride flush, acetaminophen **OR** acetaminophen, polyethylene glycol, promethazine **OR** ondansetron, acetaminophen, oxyCODONE    Labs:  CBC:   Recent Labs     12/07/20 0619 12/08/20  0537   WBC 10.0 7.6   HGB 7.0* 8.0*   HCT 20.6* 23.4*   MCV 96.3 99.2   * 648*     BMP:   Recent Labs     12/07/20 0619 12/08/20  0537    137   K 4.3 3.9    100   CO2 27 24   BUN 8 9 CREATININE 0.6* 0.9     LIVER PROFILE:   No results for input(s): AST, ALT, LIPASE, BILIDIR, BILITOT, ALKPHOS in the last 72 hours. Invalid input(s): AMYLASE,  ALB  PT/INR: No results for input(s): PROTIME, INR in the last 72 hours. APTT: No results for input(s): APTT in the last 72 hours. UA:  Recent Labs     12/08/20  0947   COLORU Yellow   PHUR 7.5   WBCUA 0-2   RBCUA 0-2   BACTERIA 2+*   CLARITYU Clear   SPECGRAV 1.015   LEUKOCYTESUR Negative   UROBILINOGEN 4.0*   BILIRUBINUR SMALL*   BLOODU Negative   GLUCOSEU Negative     No results for input(s): PHART, ITH9GZA, PO2ART in the last 72 hours. Cultures:   12/1 rapid sars Cov2- neg  12/5 PCR ars Cov2- neg  12/1 blood- ngtd  12/5 sputum NRF      Films:    CXR 12/8:  There is improved aeration with decreased bilateral pulmonary opacities.  A   small amount of bilateral faint opacity remains present.  No pneumothorax or   pleural effusion        Chest CTA 12/3: no PE; Lungs/pleura: No pneumothorax.  Patchy ground-glass attenuation posteriorly in the upper lobes.  Scattered ground-glass attenuation and patchy   consolidation within the lower lobes.            ASSESSMENT:  ·  Acute respiratory failure with hypoxemia  · Sickle cell crisis  · Pneumonia versus acute chest syndrome  · Anemia  · Fever- unclear source     PLAN:  · Supplemental oxygen to maintain SaO2 >92%; wean as tolerated   · Hematology following status post blood transfusion  · Abx: vanc and zosyn D 2; completed Levofloxacin day #7  · Steroids-Solu-Medrol 40 mg IV daily  · Inhaled bronchodilators

## 2020-12-12 LAB
BLOOD CULTURE, ROUTINE: NORMAL
CULTURE, BLOOD 2: NORMAL

## 2020-12-21 NOTE — PROGRESS NOTES
Educated patient how to monitor their peripheral vision and report any changes. Tonia Arguelles given per request, see mar.

## 2021-01-05 ENCOUNTER — HOSPITAL ENCOUNTER (INPATIENT)
Age: 21
LOS: 7 days | Discharge: HOME | DRG: 812 | End: 2021-01-12
Attending: INTERNAL MEDICINE | Admitting: FAMILY MEDICINE
Payer: COMMERCIAL

## 2021-01-05 ENCOUNTER — APPOINTMENT (OUTPATIENT)
Dept: CT IMAGING | Age: 21
DRG: 812 | End: 2021-01-05
Payer: COMMERCIAL

## 2021-01-05 ENCOUNTER — APPOINTMENT (OUTPATIENT)
Dept: GENERAL RADIOLOGY | Age: 21
DRG: 812 | End: 2021-01-05
Payer: COMMERCIAL

## 2021-01-05 DIAGNOSIS — A41.9 SEPTICEMIA (HCC): Primary | ICD-10-CM

## 2021-01-05 DIAGNOSIS — M25.50 ARTHRALGIA, UNSPECIFIED JOINT: ICD-10-CM

## 2021-01-05 DIAGNOSIS — D57.00 SICKLE CELL CRISIS (HCC): ICD-10-CM

## 2021-01-05 DIAGNOSIS — R09.02 HYPOXIA: ICD-10-CM

## 2021-01-05 DIAGNOSIS — R50.9 FEVER, UNSPECIFIED FEVER CAUSE: ICD-10-CM

## 2021-01-05 PROBLEM — Z79.891 CHRONIC PRESCRIPTION OPIATE USE: Status: ACTIVE | Noted: 2021-01-05

## 2021-01-05 LAB
A/G RATIO: 1.6 (ref 1.1–2.2)
ABO/RH: NORMAL
ALBUMIN SERPL-MCNC: 4.5 G/DL (ref 3.4–5)
ALP BLD-CCNC: 109 U/L (ref 40–129)
ALT SERPL-CCNC: 14 U/L (ref 10–40)
AMORPHOUS: NORMAL /HPF
ANION GAP SERPL CALCULATED.3IONS-SCNC: 8 MMOL/L (ref 3–16)
ANISOCYTOSIS: ABNORMAL
ANTIBODY SCREEN: NORMAL
AST SERPL-CCNC: 49 U/L (ref 15–37)
BANDED NEUTROPHILS RELATIVE PERCENT: 2 % (ref 0–7)
BASOPHILS ABSOLUTE: 0.3 K/UL (ref 0–0.2)
BASOPHILS RELATIVE PERCENT: 1 %
BILIRUB SERPL-MCNC: 3.3 MG/DL (ref 0–1)
BILIRUBIN URINE: NEGATIVE
BLOOD, URINE: ABNORMAL
BUN BLDV-MCNC: 6 MG/DL (ref 7–20)
CALCIUM SERPL-MCNC: 9.2 MG/DL (ref 8.3–10.6)
CHLORIDE BLD-SCNC: 100 MMOL/L (ref 99–110)
CLARITY: CLEAR
CO2: 25 MMOL/L (ref 21–32)
COLOR: YELLOW
CREAT SERPL-MCNC: 0.7 MG/DL (ref 0.9–1.3)
EOSINOPHILS ABSOLUTE: 0 K/UL (ref 0–0.6)
EOSINOPHILS RELATIVE PERCENT: 0 %
EPITHELIAL CELLS, UA: NORMAL /HPF (ref 0–5)
GFR AFRICAN AMERICAN: >60
GFR NON-AFRICAN AMERICAN: >60
GLOBULIN: 2.9 G/DL
GLUCOSE BLD-MCNC: 104 MG/DL (ref 70–99)
GLUCOSE URINE: NEGATIVE MG/DL
HCT VFR BLD CALC: 23.3 % (ref 40.5–52.5)
HEMATOLOGY PATH CONSULT: NO
HEMOGLOBIN: 8.2 G/DL (ref 13.5–17.5)
IMMATURE RETIC FRACT: 0.63 (ref 0.21–0.37)
KETONES, URINE: NEGATIVE MG/DL
LACTIC ACID: 0.5 MMOL/L (ref 0.4–2)
LEUKOCYTE ESTERASE, URINE: NEGATIVE
LYMPHOCYTES ABSOLUTE: 2.5 K/UL (ref 1–5.1)
LYMPHOCYTES RELATIVE PERCENT: 10 %
MACROCYTES: ABNORMAL
MCH RBC QN AUTO: 34.3 PG (ref 26–34)
MCHC RBC AUTO-ENTMCNC: 35.2 G/DL (ref 31–36)
MCV RBC AUTO: 97.7 FL (ref 80–100)
MICROCYTES: ABNORMAL
MICROSCOPIC EXAMINATION: YES
MONOCYTES ABSOLUTE: 2.8 K/UL (ref 0–1.3)
MONOCYTES RELATIVE PERCENT: 11 %
NEUTROPHILS ABSOLUTE: 19.7 K/UL (ref 1.7–7.7)
NEUTROPHILS RELATIVE PERCENT: 76 %
NITRITE, URINE: NEGATIVE
PDW BLD-RTO: 23.2 % (ref 12.4–15.4)
PH UA: 7 (ref 5–8)
PLATELET # BLD: 210 K/UL (ref 135–450)
PLATELET SLIDE REVIEW: ADEQUATE
PMV BLD AUTO: 8.1 FL (ref 5–10.5)
POIKILOCYTES: ABNORMAL
POLYCHROMASIA: ABNORMAL
POTASSIUM SERPL-SCNC: 4.4 MMOL/L (ref 3.5–5.1)
PROCALCITONIN: 0.66 NG/ML (ref 0–0.15)
PROTEIN UA: ABNORMAL MG/DL
RAPID INFLUENZA  B AGN: NEGATIVE
RAPID INFLUENZA A AGN: NEGATIVE
RBC # BLD: 2.38 M/UL (ref 4.2–5.9)
RBC UA: NORMAL /HPF (ref 0–4)
RETICULOCYTE ABSOLUTE COUNT: 0.4 M/UL
RETICULOCYTE COUNT PCT: 14.71 % (ref 0.5–2.18)
SARS-COV-2, NAAT: NOT DETECTED
SICKLE CELLS: ABNORMAL
SLIDE REVIEW: ABNORMAL
SODIUM BLD-SCNC: 133 MMOL/L (ref 136–145)
SPECIFIC GRAVITY UA: 1.01 (ref 1–1.03)
TOTAL PROTEIN: 7.4 G/DL (ref 6.4–8.2)
URINE TYPE: ABNORMAL
UROBILINOGEN, URINE: 0.2 E.U./DL
WBC # BLD: 25.2 K/UL (ref 4–11)
WBC UA: NORMAL /HPF (ref 0–5)

## 2021-01-05 PROCEDURE — 80053 COMPREHEN METABOLIC PANEL: CPT

## 2021-01-05 PROCEDURE — 87804 INFLUENZA ASSAY W/OPTIC: CPT

## 2021-01-05 PROCEDURE — 2700000000 HC OXYGEN THERAPY PER DAY

## 2021-01-05 PROCEDURE — 85045 AUTOMATED RETICULOCYTE COUNT: CPT

## 2021-01-05 PROCEDURE — 96375 TX/PRO/DX INJ NEW DRUG ADDON: CPT

## 2021-01-05 PROCEDURE — 6370000000 HC RX 637 (ALT 250 FOR IP): Performed by: INTERNAL MEDICINE

## 2021-01-05 PROCEDURE — 86900 BLOOD TYPING SEROLOGIC ABO: CPT

## 2021-01-05 PROCEDURE — 71045 X-RAY EXAM CHEST 1 VIEW: CPT

## 2021-01-05 PROCEDURE — 99284 EMERGENCY DEPT VISIT MOD MDM: CPT

## 2021-01-05 PROCEDURE — 86923 COMPATIBILITY TEST ELECTRIC: CPT

## 2021-01-05 PROCEDURE — 96368 THER/DIAG CONCURRENT INF: CPT

## 2021-01-05 PROCEDURE — P9016 RBC LEUKOCYTES REDUCED: HCPCS

## 2021-01-05 PROCEDURE — U0002 COVID-19 LAB TEST NON-CDC: HCPCS

## 2021-01-05 PROCEDURE — 85660 RBC SICKLE CELL TEST: CPT

## 2021-01-05 PROCEDURE — 94761 N-INVAS EAR/PLS OXIMETRY MLT: CPT

## 2021-01-05 PROCEDURE — 6360000004 HC RX CONTRAST MEDICATION: Performed by: EMERGENCY MEDICINE

## 2021-01-05 PROCEDURE — 87040 BLOOD CULTURE FOR BACTERIA: CPT

## 2021-01-05 PROCEDURE — 96365 THER/PROPH/DIAG IV INF INIT: CPT

## 2021-01-05 PROCEDURE — 84145 PROCALCITONIN (PCT): CPT

## 2021-01-05 PROCEDURE — 86850 RBC ANTIBODY SCREEN: CPT

## 2021-01-05 PROCEDURE — 83605 ASSAY OF LACTIC ACID: CPT

## 2021-01-05 PROCEDURE — 6360000002 HC RX W HCPCS: Performed by: EMERGENCY MEDICINE

## 2021-01-05 PROCEDURE — 85025 COMPLETE CBC W/AUTO DIFF WBC: CPT

## 2021-01-05 PROCEDURE — 86901 BLOOD TYPING SEROLOGIC RH(D): CPT

## 2021-01-05 PROCEDURE — 96366 THER/PROPH/DIAG IV INF ADDON: CPT

## 2021-01-05 PROCEDURE — 6360000002 HC RX W HCPCS: Performed by: NURSE PRACTITIONER

## 2021-01-05 PROCEDURE — 71260 CT THORAX DX C+: CPT

## 2021-01-05 PROCEDURE — 1200000000 HC SEMI PRIVATE

## 2021-01-05 PROCEDURE — 81001 URINALYSIS AUTO W/SCOPE: CPT

## 2021-01-05 PROCEDURE — 70450 CT HEAD/BRAIN W/O DYE: CPT

## 2021-01-05 PROCEDURE — 2580000003 HC RX 258: Performed by: EMERGENCY MEDICINE

## 2021-01-05 RX ORDER — SODIUM CHLORIDE, SODIUM LACTATE, POTASSIUM CHLORIDE, AND CALCIUM CHLORIDE .6; .31; .03; .02 G/100ML; G/100ML; G/100ML; G/100ML
30 INJECTION, SOLUTION INTRAVENOUS ONCE
Status: COMPLETED | OUTPATIENT
Start: 2021-01-05 | End: 2021-01-05

## 2021-01-05 RX ORDER — CHOLECALCIFEROL (VITAMIN D3) 125 MCG
1000 CAPSULE ORAL DAILY
Status: DISCONTINUED | OUTPATIENT
Start: 2021-01-06 | End: 2021-01-12 | Stop reason: HOSPADM

## 2021-01-05 RX ORDER — FOLIC ACID 1 MG/1
2 TABLET ORAL DAILY
Status: DISCONTINUED | OUTPATIENT
Start: 2021-01-06 | End: 2021-01-12 | Stop reason: HOSPADM

## 2021-01-05 RX ORDER — HYDROXYUREA 500 MG/1
1000 CAPSULE ORAL 2 TIMES DAILY
Status: DISCONTINUED | OUTPATIENT
Start: 2021-01-05 | End: 2021-01-12 | Stop reason: HOSPADM

## 2021-01-05 RX ORDER — POTASSIUM CHLORIDE 20 MEQ/1
40 TABLET, EXTENDED RELEASE ORAL PRN
Status: DISCONTINUED | OUTPATIENT
Start: 2021-01-05 | End: 2021-01-12 | Stop reason: HOSPADM

## 2021-01-05 RX ORDER — OXYCODONE HYDROCHLORIDE 5 MG/1
10 TABLET ORAL EVERY 6 HOURS PRN
Status: DISCONTINUED | OUTPATIENT
Start: 2021-01-05 | End: 2021-01-06

## 2021-01-05 RX ORDER — 0.9 % SODIUM CHLORIDE 0.9 %
30 INTRAVENOUS SOLUTION INTRAVENOUS ONCE
Status: DISCONTINUED | OUTPATIENT
Start: 2021-01-05 | End: 2021-01-05

## 2021-01-05 RX ORDER — SODIUM CHLORIDE 0.9 % (FLUSH) 0.9 %
10 SYRINGE (ML) INJECTION EVERY 12 HOURS SCHEDULED
Status: DISCONTINUED | OUTPATIENT
Start: 2021-01-05 | End: 2021-01-12 | Stop reason: HOSPADM

## 2021-01-05 RX ORDER — MECOBALAMIN 5000 MCG
2 TABLET,DISINTEGRATING ORAL NIGHTLY PRN
Status: DISCONTINUED | OUTPATIENT
Start: 2021-01-05 | End: 2021-01-12 | Stop reason: HOSPADM

## 2021-01-05 RX ORDER — POTASSIUM CHLORIDE 7.45 MG/ML
10 INJECTION INTRAVENOUS PRN
Status: DISCONTINUED | OUTPATIENT
Start: 2021-01-05 | End: 2021-01-12 | Stop reason: HOSPADM

## 2021-01-05 RX ORDER — KETOROLAC TROMETHAMINE 30 MG/ML
30 INJECTION, SOLUTION INTRAMUSCULAR; INTRAVENOUS ONCE
Status: COMPLETED | OUTPATIENT
Start: 2021-01-05 | End: 2021-01-05

## 2021-01-05 RX ORDER — SODIUM CHLORIDE 0.9 % (FLUSH) 0.9 %
10 SYRINGE (ML) INJECTION PRN
Status: DISCONTINUED | OUTPATIENT
Start: 2021-01-05 | End: 2021-01-12 | Stop reason: HOSPADM

## 2021-01-05 RX ORDER — ACETAMINOPHEN 325 MG/1
650 TABLET ORAL EVERY 6 HOURS PRN
Status: DISCONTINUED | OUTPATIENT
Start: 2021-01-05 | End: 2021-01-12 | Stop reason: HOSPADM

## 2021-01-05 RX ORDER — ONDANSETRON 2 MG/ML
4 INJECTION INTRAMUSCULAR; INTRAVENOUS EVERY 6 HOURS PRN
Status: DISCONTINUED | OUTPATIENT
Start: 2021-01-05 | End: 2021-01-12 | Stop reason: HOSPADM

## 2021-01-05 RX ORDER — PROMETHAZINE HYDROCHLORIDE 25 MG/1
12.5 TABLET ORAL EVERY 6 HOURS PRN
Status: DISCONTINUED | OUTPATIENT
Start: 2021-01-05 | End: 2021-01-12 | Stop reason: HOSPADM

## 2021-01-05 RX ORDER — ACETAMINOPHEN 650 MG/1
650 SUPPOSITORY RECTAL EVERY 6 HOURS PRN
Status: DISCONTINUED | OUTPATIENT
Start: 2021-01-05 | End: 2021-01-12 | Stop reason: HOSPADM

## 2021-01-05 RX ORDER — MAGNESIUM SULFATE 1 G/100ML
1 INJECTION INTRAVENOUS PRN
Status: DISCONTINUED | OUTPATIENT
Start: 2021-01-05 | End: 2021-01-12 | Stop reason: HOSPADM

## 2021-01-05 RX ADMIN — PIPERACILLIN SODIUM AND TAZOBACTAM SODIUM 4.5 G: 4; .5 INJECTION, POWDER, LYOPHILIZED, FOR SOLUTION INTRAVENOUS at 15:57

## 2021-01-05 RX ADMIN — Medication 10 ML: at 18:49

## 2021-01-05 RX ADMIN — VANCOMYCIN HYDROCHLORIDE 2000 MG: 1 INJECTION, POWDER, LYOPHILIZED, FOR SOLUTION INTRAVENOUS at 16:28

## 2021-01-05 RX ADMIN — ACETAMINOPHEN 650 MG: 325 TABLET ORAL at 21:28

## 2021-01-05 RX ADMIN — HYDROMORPHONE HYDROCHLORIDE 0.5 MG: 1 INJECTION, SOLUTION INTRAMUSCULAR; INTRAVENOUS; SUBCUTANEOUS at 18:49

## 2021-01-05 RX ADMIN — IOPAMIDOL 150 ML: 755 INJECTION, SOLUTION INTRAVENOUS at 16:59

## 2021-01-05 RX ADMIN — KETOROLAC TROMETHAMINE 30 MG: 30 INJECTION, SOLUTION INTRAMUSCULAR at 22:47

## 2021-01-05 RX ADMIN — SODIUM CHLORIDE, POTASSIUM CHLORIDE, SODIUM LACTATE AND CALCIUM CHLORIDE 2517 ML: 600; 310; 30; 20 INJECTION, SOLUTION INTRAVENOUS at 15:45

## 2021-01-05 RX ADMIN — OXYCODONE 10 MG: 5 TABLET ORAL at 21:28

## 2021-01-05 ASSESSMENT — PAIN SCALES - GENERAL: PAINLEVEL_OUTOF10: 10

## 2021-01-05 NOTE — ED PROVIDER NOTES
[facility] independently [facility] All diagnostic, treatment, and disposition decisions were made by myself in conjunction with the advanced practice provider. Briefly, this is a 23 y.o. male here for bilateral shoulder and bilateral knee pain. Patient has altered mental status due to clinical condition. Denies headache, neck stiffness, nausea, vomiting, fever, shortness of breath. On exam,   General: Patient is ill-appearing, opens eyes and responds appropriately to questions however promptly falls back asleep  Skin: No cyanosis  HEENT: Dry mucous membranes  Heart: Tachycardic rate, regular rhythm  Lung: No respiratory distress, no wheezes, on 3 L nasal cannula  Abdomen: Soft, nontender  Neuro: Moving all extremities, no facial droop, no slurred speech, answers questions appropriately, somnolent but arousable      Screenings            MDM  Patient is a 48-year-old man with history of sickle cell disease who presents with bilateral shoulder and knee pain, tachycardia, tachypnea, hypoxemia, fever. May have acute chest syndrome. Will give broad-spectrum antibiotics and IV fluids for possible sepsis. Has encephalopathy which may be due to sepsis. No headache, focal neuro deficits, neck stiffness, nausea to indicate meningitis. No abdominal tenderness. PE also on differential .       Patient given 30 cc per kg IV fluids. CT chest obtained which showed bilateral groundglass opacities. Differential [place] includes bacterial pneumonia versus COVID-19. Do believe that he has concurrent sickle cell crisis given patient has elevated reticulocyte count. Given IV fluids for this as well as Dilaudid. Patient was admitted to hospitalist service for pain control, IV antibiotics, new hypoxia. CT chest did not show evidence of pulmonary embolism. The total critical care time spent while evaluating and treating this patient was at least 32 minutes. This excludes time spent doing separately billable procedures. This includes time at the bedside, data interpretation, medication management, obtaining critical history from collateral sources if the patient is unable to provide it directly, and physician consultation. Specifics of interventions taken and potentially life-threatening diagnostic considerations are listed above in the medical decision making. Patient Referrals:  No follow-up provider specified. Discharge Medications:  New Prescriptions    No medications on file       FINAL IMPRESSION  1. Septicemia ([address]    2. Arthralgia, unspecified joint    3. Hypoxia    4. Fever, unspecified fever cause        Blood pressure 127/70, pulse 115, temperature 101.6 °F (38.7 °C), temperature source Oral, resp. rate 21, height 5' 8\" (1.727 m), weight 185 lb (83.9 kg), SpO2 95 %. For further details of [facility] emergency department encounter, please see documentation by advanced practice provider, Greg Baer.         Chelsea Shoemaker MD  01/05/21 3472

## 2021-01-05 NOTE — ED PROVIDER NOTES
EMERGENCY DEPARTMENT ENCOUNTER      This patient was seen and evaluated by the attending physician. Pt Name: Taylor Turcios  MRN: [id number]  [place] 2000  Date of evaluation: 1/5/2021  Provider: CARMELITA Sullivan - CNP-C  PCP: Dirk Najjar, MD  ED Attending: Vale Landis MD    History provided by the patient    CHIEF COMPLAINT:     Chief Complaint   Patient presents with    Shoulder Pain     per pt bilateral shoulder pain no injury noted    Knee Pain     per pt bilateral knees no injury    Back Pain     per pt lower back pt believes all this pain is from sickle cell crisis       HISTORY OF PRESENT ILLNESS:      Taylor Turcios is a 23 y.o. male who presents [facility]  ED with complaints of generalized pain. Patient complaining of chest pain, shoulder pain and knee pains as well as back pain. Patient states that he thinks is from sickle cell. Patient is extremely lethargic, he fell asleep twice while talking to me. He denies any trauma. Patient arrived and was febrile to 101.6 and tachycardic. He is here for further evaluation. Johan Hazel  Severity:10  Duration:today  Modifying factors:none noted    Nursing Notes were reviewed     REVIEW OF SYSTEMS:     Review of Systems  All systems, atotal of 10, are reviewed and negative except for those that were just noted in history present illness.         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 Atraumatic. External ears normal, without discharge. TMs clear bilaterally. No nasal discharge. Oropharynx clear, no erythema. Mucous membranes moist.  EYES: Conjunctiva non-injected, no lid abnormalities noted. No scleral icterus. PERRL. EOM's grossly intact. Anterior chambers clear. NECK: Supple. Normal ROM. No meningismus. No thyroid tenderness or swelling noted. CARDIOVASCULAR: tachycardic. No Murmer. PULMONARY/CHEST WALL: Effort normal. No tachypnea. Lungs clear to ausculation. ABDOMEN: Normal BS. Soft. Nondistended. No tenderness to palpate. No guarding. No hernias noted. No splenomegaly. Back: Spine is midline. No ecchymosis. No crepitus on palpation. No obvious subluxation of vertebral column. No saddle anesthesia or evidence of cauda equina. /ANORECTAL: Not assessed  MUSKULOSKELETAL: Normal ROM. No acute deformities. No edema. No tenderness to palpate. SKIN: Warm and dry. NEUROLOGICAL:  GCS 15. CN II-XII grossly intact. Strength is 5/5 in allextremities and sensation is intact.    PSYCHIATRIC: very lethargic        DIAGNOSTIC RESULTS:     LABS:    Results for orders placed or performed during the hospital encounter of 01/05/21   CBC auto differential   Result Value Ref Range    WBC 25.2 (H) 4.0 - 11.0 K/uL    RBC 2.38 (L) 4.20 - 5.90 M/uL    Hemoglobin 8.2 (L) 13.5 - 17.5 g/dL    Hematocrit 23.3 (L) 40.5 - 52.5 %    MCV 97.7 80.0 - 100.0 fL    MCH 34.3 (H) 26.0 - 34.0 pg    MCHC 35.2 31.0 - 36.0 g/dL    RDW 23.2 (H) 12.4 - 15.4 %    Platelets 595 884 - 974 K/uL    MPV 8.1 5.0 - 10.5 fL    PLATELET SLIDE REVIEW Adequate     SLIDE REVIEW see below     Path Consult No     Neutrophils % 76.0 %    Lymphocytes % 10.0 %    Monocytes % 11.0 %    Eosinophils % 0.0 %    Basophils % 1.0 %    Neutrophils Absolute 19.7 (H) 1.7 - 7.7 K/uL    Lymphocytes Absolute 2.5 1.0 - 5.1 K/uL Monocytes Absolute 2.8 (H) 0.0 - 1.3 K/uL    Eosinophils Absolute 0.0 0.0 - 0.6 K/uL    Basophils Absolute 0.3 (H) 0.0 - 0.2 K/uL    Bands Relative 2 0 - 7 %    Anisocytosis 2+ (A)     Macrocytes Occasional (A)     Microcytes Occasional (A)     Polychromasia 1+ (A)     Poikilocytes 3+ (A)     Sickle Cells 3+ (A)    Comprehensive metabolic panel   Result Value Ref Range    Sodium 133 (L) 136 - 145 mmol/L    Potassium 4.4 3.5 - 5.1 mmol/L    Chloride 100 99 - 110 mmol/L    CO2 25 21 - 32 mmol/L    Anion Gap 8 3 - 16    Glucose 104 (H) 70 - 99 mg/dL    BUN 6 (L) 7 - 20 mg/dL    CREATININE 0.7 (L) 0.9 - 1.3 mg/dL    GFR Non-African American >60 >60    GFR African American >60 >60    Calcium 9.2 8.3 - 10.6 mg/dL    Total Protein 7.4 6.4 - 8.2 g/dL    Alb 4.5 3.4 - 5.0 g/dL    Albumin/Globulin Ratio 1.6 1.1 - 2.2    Total Bilirubin 3.3 (H) 0.0 - 1.0 mg/dL    Alkaline Phosphatase 109 40 - 129 U/L    ALT 14 10 - 40 U/L    AST 49 (H) 15 - 37 U/L    Globulin 2.9 g/dL   Reticulocytes   Result Value Ref Range    Retic Ct Pct 14.71 (H) 0.50 - 2.18 %    Retic Ct Abs 0.403 M/uL    Immature Retic Fract 0.63 (H) 0.21 - 0.37   Lactic Acid, Plasma   Result Value Ref Range    Lactic Acid 0.5 0.4 - 2.0 mmol/L   Urinalysis   Result Value Ref Range    Color, UA Yellow Straw/Yellow    Clarity, UA Clear Clear    Glucose, Ur Negative Negative mg/dL    Bilirubin Urine Negative Negative    Ketones, Urine Negative Negative mg/dL    Specific Gravity, UA 1.010 1.005 - 1.030    Blood, Urine SMALL (A) Negative    pH, UA 7.0 5.0 - 8.0    Protein, UA TRACE (A) Negative mg/dL    Urobilinogen, Urine 0.2 <2.0 E.U./dL    Nitrite, Urine Negative Negative    Leukocyte Esterase, Urine Negative Negative    Microscopic Examination YES     Urine Type NotGiven    COVID-19   Result Value Ref Range    SARS-CoV-2, NAAT Not Detected Not Detected   Procalcitonin   Result Value Ref Range    Procalcitonin 0.66 (H) 0.00 - 0.15 ng/mL         RADIOLOGY: All x-ray studies are viewed/reviewedby me. Formal interpretations per the radiologist are as follows:      CT CHEST PULMONARY EMBOLISM W CONTRAST   Final Result   1. Negative for acute pulmonary embolism   2. Mild ground-glass opacities seen peripherally in the lung bases. Although   this may represent changes from a previous infectious or inflammatory   process, atypical pneumonia could give this appearance         CT HEAD WO CONTRAST   Final Result   No acute intracranial abnormality. No change from prior study         XR CHEST PORTABLE   Final Result   Stable cardiomegaly                 EKG:  See EKG interpretation by an attending phsyician      PROCEDURES:   N/A    CRITICAL CARE TIME:   Total critical care time today provided was at least 35 minutes. This excludes seperately billable procedure. Critical care time provided for sepsis that required close evaluation and/or intervention with concern for patient decompensation.      CONSULTS:  None      EMERGENCYDEPARTMENT COURSE and DIFFERENTIAL DIAGNOSIS/MDM:   Vitals:    Vitals:    01/05/21 1456 01/05/21 1506 01/05/21 1516 01/05/21 1517   BP: 117/65 127/70     Pulse: 116 115     Resp: 16 21     Temp: 101.6 °F (38.7 °C)      TempSrc: Oral      SpO2: (!) 87% (!) 83% (!) 84% 95%   Weight:       Height:           Patient was given the following medications:  Medications   sodium chloride flush 0.9 % injection 10 mL (has no administration in time range)   sodium chloride flush 0.9 % injection 10 mL (has no administration in time range)   vancomycin (VANCOCIN) 2,000 mg in dextrose 5 % 500 mL IVPB (2,000 mg Intravenous New Bag 1/5/21 1628)   lactated ringers bolus (0 mLs Intravenous Stopped 1/5/21 1629)   piperacillin-tazobactam (ZOSYN) 4.5 g in dextrose 5 % 100 mL IVPB (mini-bag) (0 g Intravenous Stopped 1/5/21 1629)   iopamidol (ISOVUE-370) 76 % injection 75 mL (150 mLs Intravenous Given 1/5/21 1659) Patient was evaluated by both myself and Bj Palacios MD.    Patient presented to the emergency room today with complaints of generalized pain. Patient has been seen here for sickle cell crisis multiple times in the past.  Patient with no focal complaint or trauma he just complains of pain everywhere. Patient very lethargic on my initial exam he fell asleep twice while answering questions. Patient was tachycardic and febrile, we did work the patient up for infection, his labs show a 25.2 white blood cell count, lactate negative pro calcitonin 1.66. CT of the chest showed possible atypical pneumonia, no PE.  CT head negative. Patient was given IV fluid bolus, started on broad-spectrum antibiotics. He was hypoxic and requiring oxygen initially. Patient was evaluated by the attending physician who agrees with plan for admission. Hospitalist consult placed. Plan will be for admission to the hospital for further evaluation and treatment    Patient laboratory studies, radiographic imaging, andassessment were all discussed with the patient and/or patient family. There was shared decision-making between myself, the attending physician, as well as the patient and/or their surrogate and we are all in agreement with admission. There was an opportunity for questions and all questions were answered to the best of my ability and to the satisfaction of the patient and/or patient family. FINAL IMPRESSION:      1. Septicemia ([address]    2. Arthralgia, unspecified joint    3. Hypoxia    4. Fever, unspecified fever cause          DISPOSITION/PLAN:   DISPOSITION      PATIENT REFERRED TO:  No follow-up provider specified.     DISCHARGE MEDICATIONS:  New Prescriptions    No medications on file                  (Please note that portions of this note were completed with a voice recognition program.  Efforts were made to edit the dictations, but occasionally words are mis-transcribed.) CARMELITA Ott - CNP-C (electronically signed)        CARMELITA Ott CNP  01/05/21 [address] CARMELITA Serrano CNP  01/05/21 1755

## 2021-01-05 NOTE — LETTER
[facility] A1 Remote Telemetry  [address] [address]  Phone: [phone]      January 12, 2021     Patient: [facility]   YOB: 2000   Date of Visit: 1/5/2021       To Whom It May Concern:    [facility] was seen and treated at [address] [place] [address] starting on 1/5/21-1/12/21. He may return to work by 1/19/21. Any further questions or concerns can be called into brian Craft for further clarification.       Sincerely,

## 2021-01-06 LAB
A/G RATIO: 1.5 (ref 1.1–2.2)
ALBUMIN SERPL-MCNC: 4.1 G/DL (ref 3.4–5)
ALP BLD-CCNC: 90 U/L (ref 40–129)
ALT SERPL-CCNC: 11 U/L (ref 10–40)
ANION GAP SERPL CALCULATED.3IONS-SCNC: 9 MMOL/L (ref 3–16)
AST SERPL-CCNC: 31 U/L (ref 15–37)
BASOPHILS ABSOLUTE: 0.1 K/UL (ref 0–0.2)
BASOPHILS RELATIVE PERCENT: 0.4 %
BILIRUB SERPL-MCNC: 5.1 MG/DL (ref 0–1)
BUN BLDV-MCNC: 8 MG/DL (ref 7–20)
CALCIUM SERPL-MCNC: 9.2 MG/DL (ref 8.3–10.6)
CHLORIDE BLD-SCNC: 102 MMOL/L (ref 99–110)
CO2: 25 MMOL/L (ref 21–32)
CREAT SERPL-MCNC: <0.5 MG/DL (ref 0.9–1.3)
EOSINOPHILS ABSOLUTE: 0.2 K/UL (ref 0–0.6)
EOSINOPHILS RELATIVE PERCENT: 1 %
GFR AFRICAN AMERICAN: >60
GFR NON-AFRICAN AMERICAN: >60
GLOBULIN: 2.8 G/DL
GLUCOSE BLD-MCNC: 98 MG/DL (ref 70–99)
HCT VFR BLD CALC: 20.1 % (ref 40.5–52.5)
HEMATOLOGY PATH CONSULT: NO
HEMOGLOBIN: 6.9 G/DL (ref 13.5–17.5)
LYMPHOCYTES ABSOLUTE: 1.3 K/UL (ref 1–5.1)
LYMPHOCYTES RELATIVE PERCENT: 7.4 %
MAGNESIUM: 1.8 MG/DL (ref 1.8–2.4)
MCH RBC QN AUTO: 34 PG (ref 26–34)
MCHC RBC AUTO-ENTMCNC: 34.6 G/DL (ref 31–36)
MCV RBC AUTO: 98.4 FL (ref 80–100)
MONOCYTES ABSOLUTE: 1.6 K/UL (ref 0–1.3)
MONOCYTES RELATIVE PERCENT: 9.3 %
NEUTROPHILS ABSOLUTE: 14.2 K/UL (ref 1.7–7.7)
NEUTROPHILS RELATIVE PERCENT: 81.9 %
PDW BLD-RTO: 22.9 % (ref 12.4–15.4)
PLATELET # BLD: 186 K/UL (ref 135–450)
PMV BLD AUTO: 9 FL (ref 5–10.5)
POTASSIUM SERPL-SCNC: 3.9 MMOL/L (ref 3.5–5.1)
PROCALCITONIN: 1.83 NG/ML (ref 0–0.15)
RBC # BLD: 2.04 M/UL (ref 4.2–5.9)
SODIUM BLD-SCNC: 136 MMOL/L (ref 136–145)
TOTAL PROTEIN: 6.9 G/DL (ref 6.4–8.2)
WBC # BLD: 17.4 K/UL (ref 4–11)

## 2021-01-06 PROCEDURE — 94761 N-INVAS EAR/PLS OXIMETRY MLT: CPT

## 2021-01-06 PROCEDURE — 6360000002 HC RX W HCPCS: Performed by: INTERNAL MEDICINE

## 2021-01-06 PROCEDURE — 2580000003 HC RX 258: Performed by: NURSE PRACTITIONER

## 2021-01-06 PROCEDURE — 1200000000 HC SEMI PRIVATE

## 2021-01-06 PROCEDURE — 6370000000 HC RX 637 (ALT 250 FOR IP): Performed by: NURSE PRACTITIONER

## 2021-01-06 PROCEDURE — 6370000000 HC RX 637 (ALT 250 FOR IP): Performed by: INTERNAL MEDICINE

## 2021-01-06 PROCEDURE — 2580000003 HC RX 258: Performed by: INTERNAL MEDICINE

## 2021-01-06 PROCEDURE — 85025 COMPLETE CBC W/AUTO DIFF WBC: CPT

## 2021-01-06 PROCEDURE — 84145 PROCALCITONIN (PCT): CPT

## 2021-01-06 PROCEDURE — 2700000000 HC OXYGEN THERAPY PER DAY

## 2021-01-06 PROCEDURE — 6360000002 HC RX W HCPCS: Performed by: NURSE PRACTITIONER

## 2021-01-06 PROCEDURE — 80053 COMPREHEN METABOLIC PANEL: CPT

## 2021-01-06 PROCEDURE — 83735 ASSAY OF MAGNESIUM: CPT

## 2021-01-06 PROCEDURE — 36415 COLL VENOUS BLD VENIPUNCTURE: CPT

## 2021-01-06 RX ORDER — KETOROLAC TROMETHAMINE 30 MG/ML
30 INJECTION, SOLUTION INTRAMUSCULAR; INTRAVENOUS EVERY 8 HOURS
Status: COMPLETED | OUTPATIENT
Start: 2021-01-06 | End: 2021-01-09

## 2021-01-06 RX ORDER — SODIUM CHLORIDE, SODIUM LACTATE, POTASSIUM CHLORIDE, CALCIUM CHLORIDE 600; 310; 30; 20 MG/100ML; MG/100ML; MG/100ML; MG/100ML
INJECTION, SOLUTION INTRAVENOUS CONTINUOUS
Status: DISCONTINUED | OUTPATIENT
Start: 2021-01-06 | End: 2021-01-07

## 2021-01-06 RX ORDER — OXYCODONE HCL 20 MG/1
20 TABLET, FILM COATED, EXTENDED RELEASE ORAL EVERY 12 HOURS SCHEDULED
Status: DISCONTINUED | OUTPATIENT
Start: 2021-01-06 | End: 2021-01-12 | Stop reason: HOSPADM

## 2021-01-06 RX ORDER — SODIUM CHLORIDE 9 MG/ML
INJECTION, SOLUTION INTRAVENOUS PRN
Status: DISCONTINUED | OUTPATIENT
Start: 2021-01-06 | End: 2021-01-12 | Stop reason: HOSPADM

## 2021-01-06 RX ORDER — 0.9 % SODIUM CHLORIDE 0.9 %
500 INTRAVENOUS SOLUTION INTRAVENOUS ONCE
Status: COMPLETED | OUTPATIENT
Start: 2021-01-06 | End: 2021-01-06

## 2021-01-06 RX ORDER — OXYCODONE HYDROCHLORIDE 5 MG/1
10 TABLET ORAL EVERY 6 HOURS PRN
Status: DISCONTINUED | OUTPATIENT
Start: 2021-01-06 | End: 2021-01-12 | Stop reason: HOSPADM

## 2021-01-06 RX ORDER — HYDROMORPHONE HCL 110MG/55ML
0.5 PATIENT CONTROLLED ANALGESIA SYRINGE INTRAVENOUS
Status: DISCONTINUED | OUTPATIENT
Start: 2021-01-06 | End: 2021-01-07

## 2021-01-06 RX ORDER — OXYCODONE HYDROCHLORIDE 5 MG/1
5 TABLET ORAL EVERY 6 HOURS PRN
Status: DISCONTINUED | OUTPATIENT
Start: 2021-01-06 | End: 2021-01-12 | Stop reason: HOSPADM

## 2021-01-06 RX ADMIN — HYDROMORPHONE HYDROCHLORIDE 0.5 MG: 2 INJECTION INTRAMUSCULAR; INTRAVENOUS; SUBCUTANEOUS at 09:12

## 2021-01-06 RX ADMIN — KETOROLAC TROMETHAMINE 30 MG: 30 INJECTION, SOLUTION INTRAMUSCULAR at 15:42

## 2021-01-06 RX ADMIN — OXYCODONE HYDROCHLORIDE 5 MG: 5 TABLET ORAL at 11:27

## 2021-01-06 RX ADMIN — FOLIC ACID 2 MG: 1 TABLET ORAL at 08:16

## 2021-01-06 RX ADMIN — HYDROMORPHONE HYDROCHLORIDE 0.5 MG: 2 INJECTION INTRAMUSCULAR; INTRAVENOUS; SUBCUTANEOUS at 15:42

## 2021-01-06 RX ADMIN — HYDROMORPHONE HYDROCHLORIDE 0.5 MG: 2 INJECTION INTRAMUSCULAR; INTRAVENOUS; SUBCUTANEOUS at 12:39

## 2021-01-06 RX ADMIN — OXYCODONE HYDROCHLORIDE 5 MG: 5 TABLET ORAL at 04:52

## 2021-01-06 RX ADMIN — HYDROMORPHONE HYDROCHLORIDE 0.5 MG: 2 INJECTION INTRAMUSCULAR; INTRAVENOUS; SUBCUTANEOUS at 18:43

## 2021-01-06 RX ADMIN — ACETAMINOPHEN 650 MG: 325 TABLET ORAL at 21:56

## 2021-01-06 RX ADMIN — CYANOCOBALAMIN TAB 500 MCG 1000 MCG: 500 TAB at 08:16

## 2021-01-06 RX ADMIN — OXYCODONE HYDROCHLORIDE 20 MG: 20 TABLET, FILM COATED, EXTENDED RELEASE ORAL at 08:15

## 2021-01-06 RX ADMIN — SODIUM CHLORIDE 500 ML: 9 INJECTION, SOLUTION INTRAVENOUS at 00:23

## 2021-01-06 RX ADMIN — HYDROMORPHONE HYDROCHLORIDE 0.5 MG: 2 INJECTION INTRAMUSCULAR; INTRAVENOUS; SUBCUTANEOUS at 21:48

## 2021-01-06 RX ADMIN — OXYCODONE HYDROCHLORIDE 10 MG: 5 TABLET ORAL at 23:56

## 2021-01-06 RX ADMIN — HYDROXYUREA 1000 MG: 500 CAPSULE ORAL at 08:16

## 2021-01-06 RX ADMIN — SODIUM CHLORIDE, POTASSIUM CHLORIDE, SODIUM LACTATE AND CALCIUM CHLORIDE: 600; 310; 30; 20 INJECTION, SOLUTION INTRAVENOUS at 22:29

## 2021-01-06 RX ADMIN — SODIUM CHLORIDE, POTASSIUM CHLORIDE, SODIUM LACTATE AND CALCIUM CHLORIDE: 600; 310; 30; 20 INJECTION, SOLUTION INTRAVENOUS at 03:58

## 2021-01-06 RX ADMIN — KETOROLAC TROMETHAMINE 30 MG: 30 INJECTION, SOLUTION INTRAMUSCULAR at 08:15

## 2021-01-06 RX ADMIN — HYDROXYUREA 1000 MG: 500 CAPSULE ORAL at 19:38

## 2021-01-06 RX ADMIN — HYDROMORPHONE HYDROCHLORIDE 0.5 MG: 2 INJECTION INTRAMUSCULAR; INTRAVENOUS; SUBCUTANEOUS at 02:45

## 2021-01-06 RX ADMIN — PIPERACILLIN AND TAZOBACTAM 3.38 G: 3; .375 INJECTION, POWDER, LYOPHILIZED, FOR SOLUTION INTRAVENOUS at 23:35

## 2021-01-06 RX ADMIN — SODIUM CHLORIDE, POTASSIUM CHLORIDE, SODIUM LACTATE AND CALCIUM CHLORIDE: 600; 310; 30; 20 INJECTION, SOLUTION INTRAVENOUS at 15:44

## 2021-01-06 RX ADMIN — HYDROMORPHONE HYDROCHLORIDE 0.5 MG: 2 INJECTION INTRAMUSCULAR; INTRAVENOUS; SUBCUTANEOUS at 06:03

## 2021-01-06 RX ADMIN — OXYCODONE HYDROCHLORIDE 20 MG: 20 TABLET, FILM COATED, EXTENDED RELEASE ORAL at 20:30

## 2021-01-06 ASSESSMENT — PAIN SCALES - GENERAL
PAINLEVEL_OUTOF10: 8
PAINLEVEL_OUTOF10: 10
PAINLEVEL_OUTOF10: 7
PAINLEVEL_OUTOF10: 8
PAINLEVEL_OUTOF10: 7
PAINLEVEL_OUTOF10: 8

## 2021-01-06 NOTE — H&P
[facility]    Consult Note      Requesting Physician:  Dr. Baron Dee:  Sickle cell crisis        HISTORY OF PRESENT ILLNESS:      Mr. Benja Montero  is a 23 y.o. male we are seeing in consultation for Sickle cell crisis. Unfortunately, he has been having more crises lately. He was started on crizanilizumab in the fall, but is only received 2 doses to try to help prevent a crisis. He was due the end of December, but did not show up for clinic. He has neck scheduled on January 15 and this is a every 6-week infusion. He complains of pain in the right shoulder, hip, and side. There is no chest pain. He states he is not particularly short of breath. He denies nausea or vomiting. CTPA done in the emergency room does not reveal a pulmonary embolism, but there is a groundglass appearance that could mimic an atypical pneumonia. He states that he was taking 5 or 6 oxycodone a day, but was not taking his OxyContin. We have had trouble with pain management in the past and his parents were going to try to help him manage his pain and control his narcotics. There is been 1 incidence of accidental overdose that was not severe.       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)     Sickle cell anemia (HCC)      Past Surgical History:    Past Surgical History:   Procedure Laterality Date    SPLENECTOMY         Current Medications:    Current Facility-Administered Medications   Medication Dose Route Frequency Provider Last Rate Last Admin    HYDROmorphone (DILAUDID) injection 0.5 mg  0.5 mg Intravenous Q3H PRN Stephan Tuttle MD   0.5 mg at 01/06/21 0603    lactated ringers infusion   Intravenous Continuous Stephan Tuttle  mL/hr at 01/06/21 0358 New Bag at 01/06/21 1753  oxyCODONE (ROXICODONE) immediate release tablet 5 mg  5 mg Oral Q6H PRN Carina Peach, APRN - CNP   5 mg at 01/06/21 1740    Or    oxyCODONE (ROXICODONE) immediate release tablet 10 mg  10 mg Oral Q6H PRN Carina Peach, APRN - CNP        ketorolac (TORADOL) injection 30 mg  30 mg Intravenous Q8H Paulo Roldan MD   30 mg at 01/06/21 0815    oxyCODONE (OXYCONTIN) extended release tablet 20 mg  20 mg Oral 2 times per day Paulo Roldan MD   20 mg at 01/06/21 0815    sodium chloride flush 0.9 % injection 10 mL  10 mL Intravenous 2 times per day Daren Ku MD        sodium chloride flush 0.9 % injection 10 mL  10 mL Intravenous PRN Daren Ku MD   10 mL at 46/59/95 9896    folic acid (FOLVITE) tablet 2 mg  2 mg Oral Daily Daren Ku MD   2 mg at 01/06/21 0816    hydroxyurea (HYDREA) chemo capsule 1,000 mg  1,000 mg Oral BID Daren Ku MD   1,000 mg at 01/06/21 1902    vitamin B-12 (CYANOCOBALAMIN) tablet 1,000 mcg  1,000 mcg Oral Daily Daren Ku MD   1,000 mcg at 01/06/21 0816    sodium chloride flush 0.9 % injection 10 mL  10 mL Intravenous PRN Daren Ku MD        potassium chloride (KLOR-CON M) extended release tablet 40 mEq  40 mEq Oral PRN Daren Ku MD        Or    potassium bicarb-citric acid (EFFER-K) effervescent tablet 40 mEq  40 mEq Oral PRN Daren Ku MD        Or    potassium chloride 10 mEq/100 mL IVPB (Peripheral Line)  10 mEq Intravenous PRN Daren Ku MD        magnesium sulfate 1 g in dextrose 5% 100 mL IVPB  1 g Intravenous PRN Daren Ku MD        promethazine (PHENERGAN) tablet 12.5 mg  12.5 mg Oral Q6H PRN Daren Ku MD        Or    ondansetron [facility]) injection 4 mg  4 mg Intravenous Q6H PRN Daren Ku MD        acetaminophen (TYLENOL) tablet 650 mg  650 mg Oral Q6H PRN Daren Ku MD   650 mg at 01/05/21 2122    Or  acetaminophen (TYLENOL) suppository 650 mg  650 mg Rectal Q6H PRN Ranjit Covarrubias MD        enoxaparin (LOVENOX) injection 40 mg  40 mg Subcutaneous Daily Ranjit Covarrubias MD   40 mg at 01/06/21 0816    melatonin disintegrating tablet 2.5 mg  2.5 mg Oral Nightly PRN Ranjit Covarrubias MD         Allergies: Allergies   Allergen Reactions    Morphine Shortness Of Breath     Other reaction(s): Histamine-like Reaction  Has asthma exacerbation with morphine-histamine type reaction         Social History:   Social History     Socioeconomic History    Marital status: Single     Spouse name: Not on file    Number of children: 0    Years of education: Not on file    Highest education level: Not on file   Occupational History    Not on file   Social Needs    Financial resource strain: Not on file    Food insecurity     Worry: Not on file     Inability: Not on file   [employer] needs     Medical: Not on file     Non-medical: Not on file   Tobacco Use    Smoking status: Never Smoker    Smokeless tobacco: Never Used   Substance and Sexual Activity    Alcohol use: Not Currently    Drug use: Never    Sexual activity: Not Currently   Lifestyle    Physical activity     Days per week: Not on file     Minutes per session: Not on file    Stress: Not on file   Relationships    Social connections     Talks on phone: Not on file     Gets together: Not on file     Attends Taoist service: Not on file     Active member of club or organization: Not on file     Attends meetings of clubs or organizations: Not on file     Relationship status: Not on file    Intimate partner violence     Fear of current or ex partner: Not on file     Emotionally abused: Not on file     Physically abused: Not on file     Forced sexual activity: Not on file   Other Topics Concern    Not on file   Social History Narrative    Not on file          Family History:     History reviewed. No pertinent family history. REVIEW OF SYSTEMS:    Review of Systems    PHYSICAL EXAM:      Vitals:  BP (!) 162/68   Pulse 92   Temp 98.4 °F (36.9 °C) (Oral)   Resp 22   Ht 5' 8\" (1.727 m)   Wt 185 lb (83.9 kg)   SpO2 96%   BMI 28.13 kg/m²     CONSTITUTIONAL:  He looks uncomfortable  EYES:  pupils equal, round and reactive to light, extra ocular muscles intact, sclera clear, conjunctiva normal  NECK:Supple, symmetrical, trachea midline, no adenopathy, thyroid symmetric, not enlarged and no tenderness, skin normal  HEMATOLOGIC/LYMPHATICS:  no cervical lymphadenopathy, no supraclavicular lymphadenopathy, no axillarylymphadenopathy and no inguinal lymphadenopathy  LUNGS:  No increased work of breathing, good air exchange, clear to auscultation bilaterally, no crackles or wheezing  CARDIOVASCULAR:  , regular rate and rhythm, normalS1 and S2, no S3 or S4, and no murmur noted  ABDOMEN:  No scars, normal bowel sounds, soft, non-distended, non-tender, no masses palpated, no hepatosplenomegally  MUSCULOSKELETAL:  There is no redness, warmth,or swelling of the joints. Full range of motion noted. Motor strength is 5 out of 5 all extremities bilaterally. NEUROLOGIC:  Awake, alert, oriented to name, place and time. Cranial nerves II-XII are grossly intact. Motor is 5 out of 5 bilaterally. SKIN:  no bruising or bleeding      DATA:    PT/INR:    Recent Labs     01/05/21  1515   PROT 7.4     PTT:  No results for input(s): APTT in the last 72 hours.   CMP:    Lab Results   Component Value Date     01/05/2021    K 4.4 01/05/2021    K 3.9 12/09/2020     01/05/2021    CO2 25 01/05/2021    BUN 6 01/05/2021    PROT 7.4 01/05/2021     :    Lab Results   Component Value Date    MG 1.50 08/17/2020     Phosphorus:  No components found for: PO4  Calcium:  No components found for: CA  CBC:    Lab Results   Component Value Date    WBC 25.2 01/05/2021    RBC 2.38 01/05/2021    HGB 8.2 01/05/2021    HCT 23.3 01/05/2021    MCV 97.7 01/05/2021 RDW 23.2 01/05/2021     01/05/2021     DIFF:  Lab Results   Component Value Date    MCV 97.7 01/05/2021    RDW 23.2 01/05/2021      [email or web address]  Uric Acid:  @labcrnt(URIC)@    Radiology Review:         Problem List  Patient Active Problem List   Diagnosis    Sickle cell pain crisis ([facility]    Asthma    Sickle cell crisis ([facility]    Sepsis ([facility]    Opiate overdose ([facility]    Opiate antagonist poisoning, accidental or unintentional, initial encounter    Leukocytosis    Hypoxia    Anemia    Other chest pain    Pneumonia due to infectious organism    Fever    Chronic prescription opiate use       IMPRESSION/RECOMMENDATIONS:    Sickle cell crisis:  -No evidence of acute chest pain syndrome  -This is typical for his pain crises  -We will resume his OxyContin 20 mg p.o. every 12  -Continue as needed oxycodone  -We will supplement this with IV Dilaudid  -We will also add Toradol 30 mg IV every 8 for 9 doses  -We will increase his Dilaudid as needed and watch for respiratory depression  -We will continue his Hydrea    Pain management:  -This has been a problem with compliance  -His parents have been in to help with this  -He seems to be having more trouble lately and this could be because he is aging  -He has been started on crizanilizumab, but missed his last dose and hopefully this will improve his quality of life  -IV fluids will be administered  -We will administered packed red blood cells if we cannot control his pain    Fever:  -I agree with empiric Zosyn and vancomycin  -COVID-19 and influenza are negative  -He does not look septic        Thank you for asking me to see the patient.        Shaheen Freitas MD  Please Contact Through [employer]

## 2021-01-06 NOTE — PROGRESS NOTES
PS Jerrol Goodpasture, NP, \"Pts temp is 102.5 gave prn tylenol and only came down to 101.4. Can I get Toradol ordered?

## 2021-01-06 NOTE — CARE COORDINATION
CASE MANAGEMENT INITIAL ASSESSMENT      Reviewed chart and completed assessment with:patient  Explained Case Management role/services. Primary contact information:[facility] Decision Maker :           Can this person be reached and be able to respond quickly, such as within a few minutes or hours? Yes  Who would be your back-up decision maker? Name Garry Vickers  Phone [phone]    Admit date/status:1/5/21  Diagnosis:sickle cell   Is this a Readmission?:  No      Insurance:aetna   Precert required for SNF: Yes       3 night stay required: No    Living arrangements, Adls, care needs, prior to admission:lives in home with mother    Adline Chamber at home:  Walker__Cane__RTS__ BSC__Shower Chair__  02__ HHN__ CPAP__  BiPap__  Hospital Bed__ W/C___ Other__________    Services in the home and/or outpatient, prior to [address] (if applicable)   · Name:  · Address:  · Dialysis Schedule:  · Phone:  · Fax:    PT/OT recs:none    Hospital Exemption Notification (HEN):needed for SNF    Barriers to discharge:none    Plan/comments:spoke with patient. Drowsy. However, patient IPTA per nursing and will likely have no DCP needs. Here till reaches pain control.  Ty Mora RN       ECOC on chart for MD signature

## 2021-01-06 NOTE — PROGRESS NOTES
Pt axo. Crying and jerking. C/o uncontrolled pain. Oncology at bedside and aware. VSS. Juice and warm blankets provided. Will wait for new orders.

## 2021-01-06 NOTE — PROGRESS NOTES
Pt admitted to room 349 from ER. VSS. Admission and shift assessment charted and completed. Pt oriented to room, call light, and telephone. Pt on 3L NC. Active bowel sounds in all 4 quadrants. Lung sounds clear. Pt denies n/v or sob. Gave prn pain medication per MAR. Pt urinated 400ml. Bedside table and call light within reach. Pt verbalized understanding when calling out for bathroom. Nonskid socks on. Pt denies further needs or questions. Will continue to monitor.

## 2021-01-06 NOTE — PROGRESS NOTES
4 Eyes Skin Assessment     The patient is being assess for  Admission    I agree that 2 RN's have performed a thorough Head to Toe Skin Assessment on the patient. ALL assessment sites listed below have been assessed. Areas assessed by both nurses:  Mariela ANDRE/ Hortencian   [x]   Head, Face, and Ears   [x]   Shoulders, Back, and Chest  [x]   Arms, Elbows, and Hands   [x]   Coccyx, Sacrum, and IschIum  [x]   Legs, Feet, and Heels        Does the Patient have Skin Breakdown?   No         Shaun Prevention initiated:  No   Wound Care Orders initiated:  No      [facility] nurse consulted for Pressure Injury (Stage 3,4, Unstageable, DTI, NWPT, and Complex wounds), New and Established Ostomies:  No      Nurse 1 eSignature: Electronically signed by Denys Austin RN on 1/5/21 at 11:22 PM EST    **SHARE this note so that the co-signing nurse is able to place an eSignature**    Nurse 2 eSignature: Electronically signed by Terrence Marrero RN on 1/6/21 at 6:08 AM EST

## 2021-01-06 NOTE — H&P
Hospital Medicine History & Physical      Patient:  Kelli Romero  :   2000  MRN:   [id number]  Date of Service: 21    CHIEF COMPLAINT:  Pain    HISTORY OF PRESENT ILLNESS:    Kelli Romero is a 23 y.o. male. He has a h/o sickle cell anemia. He presents with generalized pain. He has chronic pain and is prescribed chronic opiates. He takes oxycodone IR 10mg and averages 5 or 6 tablets per day. He follows with Hem/Onc Dr. Taina Malik. He was recently hospitalized - at [place] [address] with similar problems. He was hospitalized frequently during the second half 2020 for pain crises. He relates he is here for building pain in both legs which now extends to his low back. Pain has continued to build despite use of his home oxycodone. He denies chest pain, pleuritic pain, cough, and dyspnea. Review of Systems:  All pertinent positives and negatives are as noted in the HPI section. All other systems were reviewed and are negative. 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)     Sickle cell anemia (HCC)        Past Surgical History:   Procedure Laterality Date    SPLENECTOMY           Prior to Admission medications    Medication Sig Start Date End Date Taking? Authorizing Provider   hydroxyurea (HYDREA) 500 MG chemo capsule Take 2 capsules by mouth 2 times daily 20  Yes Glenis Leon MD   folic acid (FOLVITE) 1 MG tablet Take 2 tablets by mouth daily 20  Yes CARMELITA Yee CNP   vitamin B-12 1000 MCG tablet Take 1 tablet by mouth daily 20  Yes CARMELITA Yee CNP       Allergies:   Morphine    Social:   reports that he has never smoked. He has never used smokeless tobacco.   reports previous alcohol use. Social History     Substance and Sexual Activity   Drug Use Never       History reviewed. No pertinent family history.     PHYSICAL EXAM: AST 49 (H) 01/05/2021    ALKPHOS 109 01/05/2021    BILITOT 3.3 (H) 01/05/2021     Lab Results   Component Value Date    TROPONINI <0.01 09/26/2020     No results for input(s): PHART, WRD1TEB, PO2ART in the last 72 hours. IMAGING:  Ct Head Wo Contrast    Result Date: 1/5/2021  EXAMINATION: CT OF THE HEAD WITHOUT CONTRAST  1/5/2021 4:32 pm TECHNIQUE: CT of the head was performed without the administration of intravenous contrast. Dose modulation, iterative reconstruction, and/or weight based adjustment of the mA/kV was utilized to reduce the radiation dose to as low as reasonably achievable. COMPARISON: August 17, 2020 HISTORY: ORDERING SYSTEM PROVIDED HISTORY: AMS TECHNOLOGIST PROVIDED HISTORY: Has a \"code stroke\" or \"stroke alert\" been called? ->No Reason for exam:->AMS Reason for Exam: AMS; pt is having bilateral shoulder and knee pain. No known injury Acuity: Acute Type of Exam: Initial Relevant Medical/Surgical History: hx of sickle cell FINDINGS: BRAIN/VENTRICLES: There is no acute intracranial hemorrhage, mass effect or midline shift. No abnormal extra-axial fluid collection. The gray-white differentiation is maintained without evidence of an acute infarct. There is no evidence of hydrocephalus. ORBITS: The visualized portion of the orbits demonstrate no acute abnormality. SINUSES: The visualized paranasal sinuses and mastoid air cells demonstrate no acute abnormality. SOFT TISSUES/SKULL:  No acute abnormality of the visualized skull or soft tissues. No acute intracranial abnormality.  No change from prior study     Xr Chest Portable    Result Date: 1/5/2021 EXAMINATION: ONE XRAY VIEW OF THE CHEST 1/5/2021 3:47 pm COMPARISON: 12/08/2020 and 12/03/2020 HISTORY: ORDERING SYSTEM PROVIDED HISTORY: hypoxia, fever, ams TECHNOLOGIST PROVIDED HISTORY: Reason for exam:->hypoxia, fever, ams Reason for Exam: hypoxia,fever Acuity: Acute Type of Exam: Initial FINDINGS: Exam is performed in expiration resulting in vascular crowding. The heart is enlarged but similar to the prior portable exam of 12/03/2020. There are no objective signs of focal infiltrate or pleural effusion.      Stable cardiomegaly     Ct Chest Pulmonary Embolism W Contrast    Result Date: 1/5/2021 EXAMINATION: CTA OF THE CHEST 1/5/2021 4:32 pm TECHNIQUE: CTA of the chest was performed after the administration of intravenous contrast.  2 injections into scans were performed as the 1st injection was suboptimal.  Multiplanar reformatted images are provided for review. MIP images are provided for review. Dose modulation, iterative reconstruction, and/or weight based adjustment of the mA/kV was utilized to reduce the radiation dose to as low as reasonably achievable. COMPARISON: None. HISTORY: ORDERING SYSTEM PROVIDED HISTORY: hypoxia, fever TECHNOLOGIST PROVIDED HISTORY: Reason for exam:->hypoxia, fever Reason for Exam: Pt is having chest pain, hypoxic and has a fever Acuity: Acute Type of Exam: Initial Relevant Medical/Surgical History: hx of sickle cell FINDINGS: Pulmonary Arteries: Pulmonary arteries are adequately opacified for evaluation. No evidence of intraluminal filling defect to suggest pulmonary embolism. Main pulmonary artery is normal in caliber. Mediastinum: No evidence of mediastinal lymphadenopathy. The heart and pericardium demonstrate no acute abnormality. There is no acute abnormality of the thoracic aorta. Lungs/pleura: Ground-glass opacities seen peripherally in the lung bases. No evidence of pleural effusion or pneumothorax. Upper Abdomen: Limited images of the upper abdomen are unremarkable. Soft Tissues/Bones: No acute bone or soft tissue abnormality. 1. Negative for acute pulmonary embolism 2. Mild ground-glass opacities seen peripherally in the lung bases. Although this may represent changes from a previous infectious or inflammatory process, atypical pneumonia could give this appearance     I directly reviewed all recent imaging studies as well as pertinent prior studies. Radiology reports may or may not be available at the time of my review.     Active Hospital Problems    Diagnosis Date Noted    Fever [R50.9] 01/05/2021    Hypoxia [R09.02] 12/02/2020  S[facility] crisis ([facility] [D57.00] 07/28/2020     ASSESSMENT & PLAN  Sickle cell pain crisis  -  Will attempt pain control with combination or oxycodone and IV dilaudid for breakthrough. -  Continue home hydroxyurea, folic acid, and X73.  -  Supplemental O2 as needed. -  Received ~ 2.5L LR as a bolus in the ER. Continue with LR @ 125mL/hr for maintenance. -  Transfuse prn Hgb < 7 g/dL. Fever  -  No apparent infectious source. COVID-19 NAAT and Influenza A/B Ag negative. Procalcitonin = 0.66.  -  Received empiric zosyn and vancomycin in the ER.  -  Probably does not need further empiric abx unless an infectious syndrome clearly emerges. Repeat procalcitonin in the morning.     DVT prophylaxis: SCDs, lovenox  Code Status:  Full  Disposition:  Inpatient    Reyes Farrier MD

## 2021-01-07 LAB
A/G RATIO: 1.3 (ref 1.1–2.2)
ALBUMIN SERPL-MCNC: 3.9 G/DL (ref 3.4–5)
ALP BLD-CCNC: 76 U/L (ref 40–129)
ALT SERPL-CCNC: 9 U/L (ref 10–40)
ANION GAP SERPL CALCULATED.3IONS-SCNC: 9 MMOL/L (ref 3–16)
ANISOCYTOSIS: ABNORMAL
AST SERPL-CCNC: 23 U/L (ref 15–37)
BANDED NEUTROPHILS RELATIVE PERCENT: 2 % (ref 0–7)
BASOPHILS ABSOLUTE: 0 K/UL (ref 0–0.2)
BASOPHILS RELATIVE PERCENT: 0 %
BILIRUB SERPL-MCNC: 4.7 MG/DL (ref 0–1)
BLOOD BANK DISPENSE STATUS: NORMAL
BLOOD BANK DISPENSE STATUS: NORMAL
BLOOD BANK PRODUCT CODE: NORMAL
BLOOD BANK PRODUCT CODE: NORMAL
BPU ID: NORMAL
BPU ID: NORMAL
BUN BLDV-MCNC: 11 MG/DL (ref 7–20)
CALCIUM SERPL-MCNC: 9.5 MG/DL (ref 8.3–10.6)
CHLORIDE BLD-SCNC: 101 MMOL/L (ref 99–110)
CO2: 26 MMOL/L (ref 21–32)
CREAT SERPL-MCNC: 0.8 MG/DL (ref 0.9–1.3)
DESCRIPTION BLOOD BANK: NORMAL
DESCRIPTION BLOOD BANK: NORMAL
EOSINOPHILS ABSOLUTE: 0.2 K/UL (ref 0–0.6)
EOSINOPHILS RELATIVE PERCENT: 1 %
GFR AFRICAN AMERICAN: >60
GFR NON-AFRICAN AMERICAN: >60
GLOBULIN: 3 G/DL
GLUCOSE BLD-MCNC: 101 MG/DL (ref 70–99)
HCT VFR BLD CALC: 25.2 % (ref 40.5–52.5)
HEMATOLOGY PATH CONSULT: NO
HEMOGLOBIN: 8.6 G/DL (ref 13.5–17.5)
LYMPHOCYTES ABSOLUTE: 1.2 K/UL (ref 1–5.1)
LYMPHOCYTES RELATIVE PERCENT: 7 %
MACROCYTES: ABNORMAL
MAGNESIUM: 2 MG/DL (ref 1.8–2.4)
MCH RBC QN AUTO: 33 PG (ref 26–34)
MCHC RBC AUTO-ENTMCNC: 34.1 G/DL (ref 31–36)
MCV RBC AUTO: 97 FL (ref 80–100)
MICROCYTES: ABNORMAL
MONOCYTES ABSOLUTE: 1.4 K/UL (ref 0–1.3)
MONOCYTES RELATIVE PERCENT: 8 %
NEUTROPHILS ABSOLUTE: 15 K/UL (ref 1.7–7.7)
NEUTROPHILS RELATIVE PERCENT: 82 %
NUCLEATED RED BLOOD CELLS: 3 /100 WBC
PDW BLD-RTO: 18.8 % (ref 12.4–15.4)
PLATELET # BLD: 183 K/UL (ref 135–450)
PMV BLD AUTO: 8.4 FL (ref 5–10.5)
POLYCHROMASIA: ABNORMAL
POTASSIUM SERPL-SCNC: 3.8 MMOL/L (ref 3.5–5.1)
RBC # BLD: 2.6 M/UL (ref 4.2–5.9)
SICKLE CELLS: ABNORMAL
SODIUM BLD-SCNC: 136 MMOL/L (ref 136–145)
TOTAL PROTEIN: 6.9 G/DL (ref 6.4–8.2)
WBC # BLD: 17.8 K/UL (ref 4–11)

## 2021-01-07 PROCEDURE — 6360000002 HC RX W HCPCS: Performed by: INTERNAL MEDICINE

## 2021-01-07 PROCEDURE — 6370000000 HC RX 637 (ALT 250 FOR IP): Performed by: NURSE PRACTITIONER

## 2021-01-07 PROCEDURE — 2700000000 HC OXYGEN THERAPY PER DAY

## 2021-01-07 PROCEDURE — 36415 COLL VENOUS BLD VENIPUNCTURE: CPT

## 2021-01-07 PROCEDURE — 1200000000 HC SEMI PRIVATE

## 2021-01-07 PROCEDURE — 6370000000 HC RX 637 (ALT 250 FOR IP): Performed by: INTERNAL MEDICINE

## 2021-01-07 PROCEDURE — 80053 COMPREHEN METABOLIC PANEL: CPT

## 2021-01-07 PROCEDURE — 85025 COMPLETE CBC W/AUTO DIFF WBC: CPT

## 2021-01-07 PROCEDURE — 2580000003 HC RX 258: Performed by: INTERNAL MEDICINE

## 2021-01-07 PROCEDURE — P9016 RBC LEUKOCYTES REDUCED: HCPCS

## 2021-01-07 PROCEDURE — 2580000003 HC RX 258: Performed by: NURSE PRACTITIONER

## 2021-01-07 PROCEDURE — 94761 N-INVAS EAR/PLS OXIMETRY MLT: CPT

## 2021-01-07 PROCEDURE — 6360000002 HC RX W HCPCS: Performed by: NURSE PRACTITIONER

## 2021-01-07 PROCEDURE — 36430 TRANSFUSION BLD/BLD COMPNT: CPT

## 2021-01-07 PROCEDURE — 83735 ASSAY OF MAGNESIUM: CPT

## 2021-01-07 PROCEDURE — 2500000003 HC RX 250 WO HCPCS: Performed by: NURSE PRACTITIONER

## 2021-01-07 RX ORDER — ACETAMINOPHEN 325 MG/1
650 TABLET ORAL ONCE
Status: DISCONTINUED | OUTPATIENT
Start: 2021-01-07 | End: 2021-01-12 | Stop reason: HOSPADM

## 2021-01-07 RX ORDER — DEXTROSE, SODIUM CHLORIDE, AND POTASSIUM CHLORIDE 5; .45; .15 G/100ML; G/100ML; G/100ML
INJECTION INTRAVENOUS CONTINUOUS
Status: DISCONTINUED | OUTPATIENT
Start: 2021-01-07 | End: 2021-01-12 | Stop reason: HOSPADM

## 2021-01-07 RX ORDER — HYDROMORPHONE HCL 110MG/55ML
1 PATIENT CONTROLLED ANALGESIA SYRINGE INTRAVENOUS
Status: DISCONTINUED | OUTPATIENT
Start: 2021-01-07 | End: 2021-01-12 | Stop reason: HOSPADM

## 2021-01-07 RX ORDER — DIPHENHYDRAMINE HCL 25 MG
25 TABLET ORAL ONCE
Status: DISCONTINUED | OUTPATIENT
Start: 2021-01-07 | End: 2021-01-12 | Stop reason: HOSPADM

## 2021-01-07 RX ORDER — SODIUM CHLORIDE 9 MG/ML
INJECTION, SOLUTION INTRAVENOUS PRN
Status: DISCONTINUED | OUTPATIENT
Start: 2021-01-07 | End: 2021-01-12 | Stop reason: HOSPADM

## 2021-01-07 RX ADMIN — VANCOMYCIN HYDROCHLORIDE 1250 MG: 10 INJECTION, POWDER, LYOPHILIZED, FOR SOLUTION INTRAVENOUS at 16:48

## 2021-01-07 RX ADMIN — PIPERACILLIN AND TAZOBACTAM 3.38 G: 3; .375 INJECTION, POWDER, LYOPHILIZED, FOR SOLUTION INTRAVENOUS at 06:52

## 2021-01-07 RX ADMIN — HYDROMORPHONE HYDROCHLORIDE 1 MG: 2 INJECTION INTRAMUSCULAR; INTRAVENOUS; SUBCUTANEOUS at 22:04

## 2021-01-07 RX ADMIN — FOLIC ACID 2 MG: 1 TABLET ORAL at 07:43

## 2021-01-07 RX ADMIN — HYDROXYUREA 1000 MG: 500 CAPSULE ORAL at 19:51

## 2021-01-07 RX ADMIN — CYANOCOBALAMIN TAB 500 MCG 1000 MCG: 500 TAB at 07:43

## 2021-01-07 RX ADMIN — HYDROMORPHONE HYDROCHLORIDE 1 MG: 2 INJECTION INTRAMUSCULAR; INTRAVENOUS; SUBCUTANEOUS at 15:17

## 2021-01-07 RX ADMIN — HYDROMORPHONE HYDROCHLORIDE 0.5 MG: 2 INJECTION INTRAMUSCULAR; INTRAVENOUS; SUBCUTANEOUS at 03:50

## 2021-01-07 RX ADMIN — HYDROMORPHONE HYDROCHLORIDE 0.5 MG: 2 INJECTION INTRAMUSCULAR; INTRAVENOUS; SUBCUTANEOUS at 06:51

## 2021-01-07 RX ADMIN — OXYCODONE HYDROCHLORIDE 10 MG: 5 TABLET ORAL at 13:54

## 2021-01-07 RX ADMIN — Medication 10 ML: at 07:43

## 2021-01-07 RX ADMIN — KETOROLAC TROMETHAMINE 30 MG: 30 INJECTION, SOLUTION INTRAMUSCULAR at 00:01

## 2021-01-07 RX ADMIN — POTASSIUM CHLORIDE, DEXTROSE MONOHYDRATE AND SODIUM CHLORIDE: 150; 5; 450 INJECTION, SOLUTION INTRAVENOUS at 10:17

## 2021-01-07 RX ADMIN — PIPERACILLIN AND TAZOBACTAM 3.38 G: 3; .375 INJECTION, POWDER, LYOPHILIZED, FOR SOLUTION INTRAVENOUS at 15:17

## 2021-01-07 RX ADMIN — HYDROXYUREA 1000 MG: 500 CAPSULE ORAL at 07:49

## 2021-01-07 RX ADMIN — HYDROMORPHONE HYDROCHLORIDE 1 MG: 2 INJECTION INTRAMUSCULAR; INTRAVENOUS; SUBCUTANEOUS at 11:41

## 2021-01-07 RX ADMIN — OXYCODONE HYDROCHLORIDE 10 MG: 5 TABLET ORAL at 19:54

## 2021-01-07 RX ADMIN — KETOROLAC TROMETHAMINE 30 MG: 30 INJECTION, SOLUTION INTRAMUSCULAR at 16:48

## 2021-01-07 RX ADMIN — HYDROMORPHONE HYDROCHLORIDE 0.5 MG: 2 INJECTION INTRAMUSCULAR; INTRAVENOUS; SUBCUTANEOUS at 00:52

## 2021-01-07 RX ADMIN — KETOROLAC TROMETHAMINE 30 MG: 30 INJECTION, SOLUTION INTRAMUSCULAR at 23:37

## 2021-01-07 RX ADMIN — HYDROMORPHONE HYDROCHLORIDE 1 MG: 2 INJECTION INTRAMUSCULAR; INTRAVENOUS; SUBCUTANEOUS at 18:28

## 2021-01-07 RX ADMIN — VANCOMYCIN HYDROCHLORIDE 1250 MG: 10 INJECTION, POWDER, LYOPHILIZED, FOR SOLUTION INTRAVENOUS at 04:21

## 2021-01-07 RX ADMIN — OXYCODONE HYDROCHLORIDE 20 MG: 20 TABLET, FILM COATED, EXTENDED RELEASE ORAL at 20:59

## 2021-01-07 RX ADMIN — OXYCODONE HYDROCHLORIDE 20 MG: 20 TABLET, FILM COATED, EXTENDED RELEASE ORAL at 10:17

## 2021-01-07 RX ADMIN — PIPERACILLIN AND TAZOBACTAM 3.38 G: 3; .375 INJECTION, POWDER, LYOPHILIZED, FOR SOLUTION INTRAVENOUS at 22:07

## 2021-01-07 RX ADMIN — OXYCODONE HYDROCHLORIDE 10 MG: 5 TABLET ORAL at 07:43

## 2021-01-07 RX ADMIN — KETOROLAC TROMETHAMINE 30 MG: 30 INJECTION, SOLUTION INTRAMUSCULAR at 07:43

## 2021-01-07 ASSESSMENT — PAIN SCALES - GENERAL
PAINLEVEL_OUTOF10: 7
PAINLEVEL_OUTOF10: 7
PAINLEVEL_OUTOF10: 8
PAINLEVEL_OUTOF10: 8
PAINLEVEL_OUTOF10: 10
PAINLEVEL_OUTOF10: 8
PAINLEVEL_OUTOF10: 7
PAINLEVEL_OUTOF10: 8

## 2021-01-07 NOTE — PROGRESS NOTES
Hospitalist Progress Note      PCP: Constanza Keane MD    Date of Admission: 1/5/2021    Chief Complaint: Sickle cell pain crisis    Hospital Course: Reviewed H&P    Subjective: Patient evaluated by hematology this morning and adjusted pain regimen. Noted recommendations. Currently patient resting in bed comfortably. Medications:  Reviewed    Infusion Medications    lactated ringers 125 mL/hr at 01/06/21 2229    sodium chloride Stopped (01/07/21 0330)     Scheduled Medications    ketorolac  30 mg Intravenous Q8H    oxyCODONE  20 mg Oral 2 times per day    piperacillin-tazobactam  3.375 g Intravenous Q8H    vancomycin  1,250 mg Intravenous Q12H    sodium chloride flush  10 mL Intravenous 2 times per day    folic acid  2 mg Oral Daily    hydroxyurea  1,000 mg Oral BID    cyanocobalamin  1,000 mcg Oral Daily    enoxaparin  40 mg Subcutaneous Daily     PRN Meds: HYDROmorphone, oxyCODONE **OR** oxyCODONE, sodium chloride, sodium chloride flush, sodium chloride flush, potassium chloride **OR** potassium alternative oral replacement **OR** potassium chloride, magnesium sulfate, promethazine **OR** ondansetron, acetaminophen **OR** acetaminophen, melatonin      Intake/Output Summary (Last 24 hours) at 1/7/2021 0814  Last data filed at 1/7/2021 0755  Gross per 24 hour   Intake 2677 ml   Output 4800 ml   Net -2123 ml       Physical Exam Performed:  /66   Pulse 80   Temp 97.7 °F (36.5 °C) (Oral)   Resp 22   Ht 5' 8\" (1.727 m)   Wt 177 lb 11.2 oz (80.6 kg)   SpO2 100%   BMI 27.02 kg/m²     General appearance: AAM in No apparent distress, appears stated age and cooperative. HEENT: Pupils equal, round, and reactive to light. Conjunctivae/corneas clear. Neck: Supple, with full range of motion. No jugular venous distention. Trachea midline. Respiratory:  Normal respiratory effort. Clear to auscultation, bilaterally without Rales/Wheezes/Rhonchi. Cardiovascular: Regular rate and rhythm with normal S1/S2 without murmurs, rubs or gallops. Abdomen: Soft, non-tender, non-distended with normal bowel sounds. Musculoskeletal: No clubbing, cyanosis or edema bilaterally. Full range of motion without deformity. Skin: Skin color, texture, turgor normal.  No rashes or lesions. Neurologic:  Neurovascularly intact without any focal sensory/motor deficits. Cranial nerves: II-XII intact, grossly non-focal.  Psychiatric: Alert and oriented, thought content appropriate, normal insight  Capillary Refill: Brisk,< 3 seconds   Peripheral Pulses: +2 palpable, equal bilaterally       Labs:   Recent Labs     01/05/21  1515 01/06/21  0951   WBC 25.2* 17.4*   HGB 8.2* 6.9*   HCT 23.3* 20.1*    186     Recent Labs     01/05/21  1515 01/06/21  0951   * 136   K 4.4 3.9    102   CO2 25 25   BUN 6* 8   CREATININE 0.7* <0.5*   CALCIUM 9.2 9.2     Recent Labs     01/05/21  1515 01/06/21  0951   AST 49* 31   ALT 14 11   BILITOT 3.3* 5.1*   ALKPHOS 109 90     Urinalysis:    Lab Results   Component Value Date    NITRU Negative 01/05/2021    WBCUA None seen 01/05/2021    RBCUA 0-2 01/05/2021    BLOODU SMALL 01/05/2021    SPECGRAV 1.010 01/05/2021    GLUCOSEU Negative 01/05/2021       Radiology:  CT CHEST PULMONARY EMBOLISM W CONTRAST   Final Result   1. Negative for acute pulmonary embolism   2. Mild ground-glass opacities seen peripherally in the lung bases. Although   this may represent changes from a previous infectious or inflammatory   process, atypical pneumonia could give this appearance         CT HEAD WO CONTRAST   Final Result   No acute intracranial abnormality.       No change from prior study         XR CHEST PORTABLE   Final Result   Stable cardiomegaly           Active Hospital Problems    Diagnosis Date Noted    Fever [R50.9] 01/05/2021    Hypoxia [R09.02] 12/02/2020    Sickle cell crisis ([facility] [D57.00] 07/28/2020     Assessment/Plan:

## 2021-01-07 NOTE — CARE COORDINATION
Chart reviewed. Care managed per oncology and IM. Transfusion today. Pain improved. Following IPTA will likely have no DCP needs.  Abbie Marquez RN

## 2021-01-07 NOTE — ACP (ADVANCE CARE PLANNING)
Advance Care Planning     General Advance Care Planning (ACP) Conversation    Date of Conversation: 1/5/2021  Conducted with: Patient with Decision Making Capacity    Healthcare Decision Maker:      Primary Decision Maker: Jasper Valdo - Parent, Legal Guardian - [phone]    Secondary Decision Maker: Everardo Vasquez - Parent - [phone]    Today we documented Decision Maker(s) consistent with Legal Next of Kin hierarchy.     Content/Action Overview:  Has NO ACP documents/care preferences - information provided, considering goals and options  Reviewed DNR/DNI and patient elects Full Code (Attempt Resuscitation)    Length of Voluntary ACP Conversation in minutes:  <16 minutes (Non-Billable)    Luz Golden

## 2021-01-07 NOTE — PROGRESS NOTES
ONCOLOGY HEMATOLOGY CARE PROGRESS NOTE      SUBJECTIVE:     Blood transfusion just finished this AM. He is resting. Pain controlled this AM. Fevers are coming down. Blood cx NGTD. VSS. ROS:   The remaining 10 point review of symptoms is unremarkable. OBJECTIVE        Physical    VITALS:  /66   Pulse 80   Temp 97.7 °F (36.5 °C) (Oral)   Resp 22   Ht 5' 8\" (1.727 m)   Wt 177 lb 11.2 oz (80.6 kg)   SpO2 100%   BMI 27.02 kg/m²   TEMPERATURE:  Current - Temp: 97.7 °F (36.5 °C); Max - Temp  Av.2 °F (37.3 °C)  Min: 97.7 °F (36.5 °C)  Max: 102.9 °F (39.4 °C)  PULSE OXIMETRY RANGE: SpO2  Av.2 %  Min: 92 %  Max: 100 %  24HR INTAKE/OUTPUT:      Intake/Output Summary (Last 24 hours) at 2021 0836  Last data filed at 2021 0755  Gross per 24 hour   Intake 2677 ml   Output 4350 ml   Net -1673 ml       CONSTITUTIONAL:  awake, alert, cooperative, no apparent distress, HEENT oral pharynx , no scleral icterus  HEMATOLOGIC/LYMPHATICS:  no cervical lymphadenopathy, no supraclavicular lymphadenopathy, no axillary lymphadenopathy and no inguinal lymphadenopathy  LUNGS:  No increased work of breathing, good air exchange, clear to auscultation bilaterally, no crackles or wheezing  CARDIOVASCULAR:  , regular rate and rhythm, normal S1 and S2, no S3 or S4, and no murmur noted  ABDOMEN:  No scars, normal bowel sounds, soft, non-distended, non-tender, no masses palpated, no hepatosplenomegally  MUSCULOSKELETAL:  There is no redness, warmth, or swelling of the joints. EXTREMETIES: No clubbing cynosis or edema  NEUROLOGIC:  Awake, alert, oriented to name, place and time. Cranial nerves II-XII are grossly intact. Motor is 5 out of 5 bilaterally.    SKIN:  no bruising or bleeding      Data      Recent Labs     21  1515 21  0951   WBC 25.2* 17.4*   HGB 8.2* 6.9*   HCT 23.3* 20.1*    186   MCV 97.7 98.4        Recent Labs     21  1515 21  0951 * 136   K 4.4 3.9    102   CO2 25 25   BUN 6* 8   CREATININE 0.7* <0.5*     Recent Labs     01/05/21  1515 01/06/21  0951   AST 49* 31   ALT 14 11   BILITOT 3.3* 5.1*   ALKPHOS 109 90       Magnesium:    Lab Results   Component Value Date    MG 1.80 01/06/2021    MG 1.50 08/17/2020    MG 1.70 08/15/2020         Problem List  Patient Active Problem List   Diagnosis    S[facility] crisis [facility]    Asthma    Sickle cell crisis ([facility]    Sepsis ([facility]    [facility]    Opiate antagonist poisoning, accidental or unintentional, initial encounter    Leukocytosis    Hypoxia    Anemia    Other chest pain    Pneumonia due to infectious organism    Fever    Chronic prescription opiate use       ASSESSMENT AND PLAN:      Sickle cell crisis:  -No evidence of acute chest pain syndrome  -This is typical for his pain crises  -We will resume his OxyContin 20 mg p.o. every 12  -Continue as needed oxycodone 5-10 mg PRN   -We will supplement this with IV Dilaudid  -We will also add Toradol 30 mg IV every 8 for 9 doses  -We will increase his Dilaudid as needed and watch for respiratory depression- increase to 1 mg- stable   -We will continue his Hydrea  - No plans for exchange transfusion at this time   - stop LR and start D51/2 NS with K at 75 cc/hour      Pain management:  -This has been a problem with compliance  -His parents have been in to help with this  -He seems to be having more trouble lately and this could be because he is aging  -He has been started on crizanilizumab, but missed his last dose and hopefully this will improve his quality of life  -IV fluids will be administered  -s/p 2 units PRBC 1/7 with repeat H/H pending   - Increase Dilaudid to 1 mg PRN IVP      Fever:  -I agree with empiric Zosyn and vancomycin  -COVID-19 and influenza are negative  -He does not look septic     Dr. Israel Ordoñez to see tomorrow.        ONCOLOGIC DISPOSITION: TBD 2-3 days       Ruth Ann Mcknight CNP   [facility] Please contact through [facility]

## 2021-01-07 NOTE — CONSULTS
Pharmacy to Dose Vancomycin    Dx: Fever  Goal trough = 10-20  Pt wt = 83.9 kg  Recent Labs     01/05/21  1515 01/06/21  0951   CREATININE 0.7* <0.5*     Recent Labs     01/05/21  1515 01/06/21  0951   WBC 25.2* 17.4*     Vanc 2000mg given in ED. Start Vancomycin 1250 mg IV q12h. Vancomycin trough: 1/8 1600  Sarah Brannon 1/6/2021 11:21 PM      Vancomycin Day 3  Current Dosing: Vancomycin 1250 mg IVPB q12h  Vancomycin trough = 6.4 mcg/mL at 1614  Estimated Creatinine Clearance: 188 mL/min (A) (based on SCr of 0.6 mg/dL (L)).   Start Vancomycin 1250 mg IVPB q8h today at 1800  Vancomycin trough ordered for 1/9 at [address] 1/8/2021 5:13 PM

## 2021-01-07 NOTE — PROGRESS NOTES
Temperature 102.9. PRN tylenol given, ice packs applied. Notified Christa Davis NP, waiting on callback.

## 2021-01-08 LAB
A/G RATIO: 1.4 (ref 1.1–2.2)
ALBUMIN SERPL-MCNC: 4 G/DL (ref 3.4–5)
ALP BLD-CCNC: 74 U/L (ref 40–129)
ALT SERPL-CCNC: 8 U/L (ref 10–40)
ANION GAP SERPL CALCULATED.3IONS-SCNC: 6 MMOL/L (ref 3–16)
AST SERPL-CCNC: 19 U/L (ref 15–37)
BASOPHILS ABSOLUTE: 0.1 K/UL (ref 0–0.2)
BASOPHILS RELATIVE PERCENT: 1.2 %
BILIRUB SERPL-MCNC: 4.3 MG/DL (ref 0–1)
BUN BLDV-MCNC: 10 MG/DL (ref 7–20)
CALCIUM SERPL-MCNC: 9.2 MG/DL (ref 8.3–10.6)
CHLORIDE BLD-SCNC: 103 MMOL/L (ref 99–110)
CO2: 25 MMOL/L (ref 21–32)
CREAT SERPL-MCNC: 0.6 MG/DL (ref 0.9–1.3)
EOSINOPHILS ABSOLUTE: 0.2 K/UL (ref 0–0.6)
EOSINOPHILS RELATIVE PERCENT: 2 %
GFR AFRICAN AMERICAN: >60
GFR NON-AFRICAN AMERICAN: >60
GLOBULIN: 2.9 G/DL
GLUCOSE BLD-MCNC: 94 MG/DL (ref 70–99)
HCT VFR BLD CALC: 24.5 % (ref 40.5–52.5)
HEMOGLOBIN: 8.4 G/DL (ref 13.5–17.5)
LYMPHOCYTES ABSOLUTE: 0.8 K/UL (ref 1–5.1)
LYMPHOCYTES RELATIVE PERCENT: 7.1 %
MAGNESIUM: 2.1 MG/DL (ref 1.8–2.4)
MCH RBC QN AUTO: 33.8 PG (ref 26–34)
MCHC RBC AUTO-ENTMCNC: 34.4 G/DL (ref 31–36)
MCV RBC AUTO: 98.2 FL (ref 80–100)
MONOCYTES ABSOLUTE: 1.5 K/UL (ref 0–1.3)
MONOCYTES RELATIVE PERCENT: 14.1 %
NEUTROPHILS ABSOLUTE: 8.1 K/UL (ref 1.7–7.7)
NEUTROPHILS RELATIVE PERCENT: 75.6 %
PDW BLD-RTO: 18.6 % (ref 12.4–15.4)
PLATELET # BLD: 197 K/UL (ref 135–450)
PMV BLD AUTO: 8.9 FL (ref 5–10.5)
POTASSIUM SERPL-SCNC: 4.4 MMOL/L (ref 3.5–5.1)
RBC # BLD: 2.49 M/UL (ref 4.2–5.9)
SODIUM BLD-SCNC: 134 MMOL/L (ref 136–145)
TOTAL PROTEIN: 6.9 G/DL (ref 6.4–8.2)
VANCOMYCIN TROUGH: 6.4 UG/ML (ref 10–20)
WBC # BLD: 10.6 K/UL (ref 4–11)

## 2021-01-08 PROCEDURE — 2580000003 HC RX 258: Performed by: INTERNAL MEDICINE

## 2021-01-08 PROCEDURE — 6360000002 HC RX W HCPCS: Performed by: INTERNAL MEDICINE

## 2021-01-08 PROCEDURE — 1200000000 HC SEMI PRIVATE

## 2021-01-08 PROCEDURE — 6370000000 HC RX 637 (ALT 250 FOR IP): Performed by: NURSE PRACTITIONER

## 2021-01-08 PROCEDURE — 80053 COMPREHEN METABOLIC PANEL: CPT

## 2021-01-08 PROCEDURE — 2500000003 HC RX 250 WO HCPCS: Performed by: NURSE PRACTITIONER

## 2021-01-08 PROCEDURE — 85025 COMPLETE CBC W/AUTO DIFF WBC: CPT

## 2021-01-08 PROCEDURE — 36415 COLL VENOUS BLD VENIPUNCTURE: CPT

## 2021-01-08 PROCEDURE — 6360000002 HC RX W HCPCS: Performed by: NURSE PRACTITIONER

## 2021-01-08 PROCEDURE — 80202 ASSAY OF VANCOMYCIN: CPT

## 2021-01-08 PROCEDURE — 2580000003 HC RX 258: Performed by: NURSE PRACTITIONER

## 2021-01-08 PROCEDURE — 6370000000 HC RX 637 (ALT 250 FOR IP): Performed by: INTERNAL MEDICINE

## 2021-01-08 PROCEDURE — 83735 ASSAY OF MAGNESIUM: CPT

## 2021-01-08 RX ADMIN — KETOROLAC TROMETHAMINE 30 MG: 30 INJECTION, SOLUTION INTRAMUSCULAR at 08:30

## 2021-01-08 RX ADMIN — HYDROXYUREA 1000 MG: 500 CAPSULE ORAL at 20:45

## 2021-01-08 RX ADMIN — HYDROMORPHONE HYDROCHLORIDE 1 MG: 2 INJECTION INTRAMUSCULAR; INTRAVENOUS; SUBCUTANEOUS at 01:05

## 2021-01-08 RX ADMIN — FOLIC ACID 2 MG: 1 TABLET ORAL at 08:30

## 2021-01-08 RX ADMIN — HYDROXYUREA 1000 MG: 500 CAPSULE ORAL at 08:32

## 2021-01-08 RX ADMIN — KETOROLAC TROMETHAMINE 30 MG: 30 INJECTION, SOLUTION INTRAMUSCULAR at 16:53

## 2021-01-08 RX ADMIN — PIPERACILLIN AND TAZOBACTAM 3.38 G: 3; .375 INJECTION, POWDER, LYOPHILIZED, FOR SOLUTION INTRAVENOUS at 06:15

## 2021-01-08 RX ADMIN — CEFEPIME 2 G: 2 INJECTION, POWDER, FOR SOLUTION INTRAMUSCULAR; INTRAVENOUS at 08:31

## 2021-01-08 RX ADMIN — HYDROMORPHONE HYDROCHLORIDE 1 MG: 2 INJECTION INTRAMUSCULAR; INTRAVENOUS; SUBCUTANEOUS at 14:08

## 2021-01-08 RX ADMIN — HYDROMORPHONE HYDROCHLORIDE 1 MG: 2 INJECTION INTRAMUSCULAR; INTRAVENOUS; SUBCUTANEOUS at 21:53

## 2021-01-08 RX ADMIN — VANCOMYCIN HYDROCHLORIDE 1250 MG: 10 INJECTION, POWDER, LYOPHILIZED, FOR SOLUTION INTRAVENOUS at 04:04

## 2021-01-08 RX ADMIN — OXYCODONE HYDROCHLORIDE 10 MG: 5 TABLET ORAL at 12:41

## 2021-01-08 RX ADMIN — HYDROMORPHONE HYDROCHLORIDE 1 MG: 2 INJECTION INTRAMUSCULAR; INTRAVENOUS; SUBCUTANEOUS at 10:44

## 2021-01-08 RX ADMIN — HYDROMORPHONE HYDROCHLORIDE 1 MG: 2 INJECTION INTRAMUSCULAR; INTRAVENOUS; SUBCUTANEOUS at 16:58

## 2021-01-08 RX ADMIN — OXYCODONE HYDROCHLORIDE 20 MG: 20 TABLET, FILM COATED, EXTENDED RELEASE ORAL at 08:30

## 2021-01-08 RX ADMIN — HYDROMORPHONE HYDROCHLORIDE 1 MG: 2 INJECTION INTRAMUSCULAR; INTRAVENOUS; SUBCUTANEOUS at 04:13

## 2021-01-08 RX ADMIN — OXYCODONE HYDROCHLORIDE 10 MG: 5 TABLET ORAL at 18:41

## 2021-01-08 RX ADMIN — HYDROMORPHONE HYDROCHLORIDE 1 MG: 2 INJECTION INTRAMUSCULAR; INTRAVENOUS; SUBCUTANEOUS at 07:19

## 2021-01-08 RX ADMIN — CYANOCOBALAMIN TAB 500 MCG 1000 MCG: 500 TAB at 08:30

## 2021-01-08 RX ADMIN — VANCOMYCIN HYDROCHLORIDE 1250 MG: 10 INJECTION, POWDER, LYOPHILIZED, FOR SOLUTION INTRAVENOUS at 17:42

## 2021-01-08 RX ADMIN — POTASSIUM CHLORIDE, DEXTROSE MONOHYDRATE AND SODIUM CHLORIDE: 150; 5; 450 INJECTION, SOLUTION INTRAVENOUS at 17:00

## 2021-01-08 RX ADMIN — OXYCODONE HYDROCHLORIDE 20 MG: 20 TABLET, FILM COATED, EXTENDED RELEASE ORAL at 20:44

## 2021-01-08 RX ADMIN — OXYCODONE HYDROCHLORIDE 10 MG: 5 TABLET ORAL at 05:33

## 2021-01-08 RX ADMIN — POTASSIUM CHLORIDE, DEXTROSE MONOHYDRATE AND SODIUM CHLORIDE: 150; 5; 450 INJECTION, SOLUTION INTRAVENOUS at 01:05

## 2021-01-08 RX ADMIN — CEFEPIME 2 G: 2 INJECTION, POWDER, FOR SOLUTION INTRAMUSCULAR; INTRAVENOUS at 22:46

## 2021-01-08 ASSESSMENT — PAIN SCALES - GENERAL
PAINLEVEL_OUTOF10: 7
PAINLEVEL_OUTOF10: 8
PAINLEVEL_OUTOF10: 7
PAINLEVEL_OUTOF10: 7
PAINLEVEL_OUTOF10: 8
PAINLEVEL_OUTOF10: 7
PAINLEVEL_OUTOF10: 7
PAINLEVEL_OUTOF10: 2
PAINLEVEL_OUTOF10: 7
PAINLEVEL_OUTOF10: 8
PAINLEVEL_OUTOF10: 7

## 2021-01-08 NOTE — PROGRESS NOTES
Assessment completed and documented. VSS. A/ox4. C/o consistent should and back pain 8/10, given prn pain medication per mar. Stand by assist to bathroom, but uses urinal often. Bed locked and in lowest position. Bedside table and call light within reach. Denies further needs at this time.

## 2021-01-08 NOTE — PROGRESS NOTES
Hospitalist Progress Note      PCP: Hemant Ramey MD    Date of Admission: 1/5/2021    Chief Complaint: Sickle cell pain crisis    Hospital Course: Reviewed H&P    Subjective: Patient evaluated by hematology this morning and adjusted pain regimen. Noted recommendations - S/p 2 u PRBC transfusion ON 1/7/21 and Hb ~ 9 this AM  .  C/o feeling better when compared to admission but reports worsening pain with minimal movement. Medications:  Reviewed    Infusion Medications    sodium chloride      dextrose 5% and 0.45% NaCl with KCl 20 mEq 75 mL/hr at 01/08/21 0105    sodium chloride Stopped (01/07/21 0330)     Scheduled Medications    cefepime  2 g Intravenous Q12H    acetaminophen  650 mg Oral Once    diphenhydrAMINE  25 mg Oral Once    ketorolac  30 mg Intravenous Q8H    oxyCODONE  20 mg Oral 2 times per day    vancomycin  1,250 mg Intravenous Q12H    sodium chloride flush  10 mL Intravenous 2 times per day    folic acid  2 mg Oral Daily    hydroxyurea  1,000 mg Oral BID    cyanocobalamin  1,000 mcg Oral Daily    enoxaparin  40 mg Subcutaneous Daily     PRN Meds: sodium chloride, HYDROmorphone, oxyCODONE **OR** oxyCODONE, sodium chloride, sodium chloride flush, sodium chloride flush, potassium chloride **OR** potassium alternative oral replacement **OR** potassium chloride, magnesium sulfate, promethazine **OR** ondansetron, acetaminophen **OR** acetaminophen, melatonin      Intake/Output Summary (Last 24 hours) at 1/8/2021 1609  Last data filed at 1/8/2021 1405  Gross per 24 hour   Intake 3278.21 ml   Output 1950 ml   Net 1328.21 ml       Physical Exam Performed:  /62   Pulse 88   Temp 98 °F (36.7 °C) (Oral)   Resp 16   Ht 5' 8\" (1.727 m)   Wt 178 lb 11.2 oz (81.1 kg)   SpO2 97%   BMI 27.17 kg/m²     General appearance: AAM in No apparent distress, appears stated age and cooperative. HEENT: Pupils equal, round, and reactive to light. Conjunctivae/corneas clear. Neck: Supple, with full range of motion. No jugular venous distention. Trachea midline. Respiratory:  Normal respiratory effort. Clear to auscultation, bilaterally without Rales/Wheezes/Rhonchi. Cardiovascular: Regular rate and rhythm with normal S1/S2 without murmurs, rubs or gallops. Abdomen: Soft, non-tender, non-distended with normal bowel sounds. Musculoskeletal: No clubbing, cyanosis or edema bilaterally. Full range of motion without deformity. Skin: Skin color, texture, turgor normal.  No rashes or lesions. Neurologic:  Neurovascularly intact without any focal sensory/motor deficits. Cranial nerves: II-XII intact, grossly non-focal.  Psychiatric: Alert and oriented, thought content appropriate, normal insight  Capillary Refill: Brisk,< 3 seconds   Peripheral Pulses: +2 palpable, equal bilaterally       Labs:   Recent Labs     01/06/21  0951 01/07/21  1155 01/08/21  0622   WBC 17.4* 17.8* 10.6   HGB 6.9* 8.6* 8.4*   HCT 20.1* 25.2* 24.5*    183 197     Recent Labs     01/06/21  0951 01/07/21  1155 01/08/21  0622    136 134*   K 3.9 3.8 4.4    101 103   CO2 25 26 25   BUN 8 11 10   CREATININE <0.5* 0.8* 0.6*   CALCIUM 9.2 9.5 9.2     Recent Labs     01/06/21  0951 01/07/21  1155 01/08/21  0622   AST 31 23 19   ALT 11 9* 8*   BILITOT 5.1* 4.7* 4.3*   ALKPHOS 90 76 74     Urinalysis:    Lab Results   Component Value Date    NITRU Negative 01/05/2021    WBCUA None seen 01/05/2021    RBCUA 0-2 01/05/2021    BLOODU SMALL 01/05/2021    SPECGRAV 1.010 01/05/2021    GLUCOSEU Negative 01/05/2021       Radiology:  CT CHEST PULMONARY EMBOLISM W CONTRAST   Final Result   1. Negative for acute pulmonary embolism   2. Mild ground-glass opacities seen peripherally in the lung bases. Although   this may represent changes from a previous infectious or inflammatory   process, atypical pneumonia could give this appearance         CT HEAD WO CONTRAST   Final Result   No acute intracranial abnormality. No change from prior study         XR CHEST PORTABLE   Final Result   Stable cardiomegaly           Active Hospital Problems    Diagnosis Date Noted    Fever [R50.9] 01/05/2021    Hypoxia [R09.02] 12/02/2020    Sickle cell crisis (HCC) [D57.00] 07/28/2020     Assessment/Plan:  Sickle cell pain crisis  -  Continue  pain control with combination or oxycodone and IV dilaudid for breakthrough (per HEMATOLOGY) . -  Continue home hydroxyurea, folic acid, and H52.  -  Supplemental O2 as needed. -  Received ~ 2.5L LR as a bolus in the ER. Continue with D50.9NS @ 75mL/hr for maintenance. -  Transfuse prn Hgb < 7 g/dL. -Hematology consulted to assist with management- S/p 2 u PRBC transfusion on 1/7/21     Fever (POA)   -  No apparent infectious source. COVID-19 NAAT and Influenza A/B Ag negative. Procalcitonin = 0.66.  -  Received empiric zosyn and vancomycin in the ER. Continued pending cultures -no growth to date  -Leukocytosis resolved . W/d/w hematology if can DC abx      DVT Prophylaxis: Lovenox  Diet: DIET GENERAL;  Code Status: Full Code    PT/OT Eval Status: Ambulatory  at baseline    Dispo - Likely 1-2 days pending Pain control and Oked by Hematology        The note was completed using Dragon -speech recognition software & EMR  . Every effort was made to ensure accuracy; however, inadvertent computerized transcription errors may be present.     Alycia Brown MD

## 2021-01-08 NOTE — PROGRESS NOTES
ONCOLOGY HEMATOLOGY CARE PROGRESS NOTE      SUBJECTIVE:     The patient states that he feels much better this morning. ROS:   The remaining 10 point review of symptoms is unremarkable. OBJECTIVE        Physical    VITALS:  /62   Pulse 97   Temp 98.1 °F (36.7 °C) (Tympanic)   Resp 18   Ht 5' 8\" (1.727 m)   Wt 178 lb 11.2 oz (81.1 kg)   SpO2 98%   BMI 27.17 kg/m²   TEMPERATURE:  Current - Temp: 98.1 °F (36.7 °C); Max - Temp  Av.4 °F (36.9 °C)  Min: 97.6 °F (36.4 °C)  Max: 99.4 °F (37.4 °C)  PULSE OXIMETRY RANGE: SpO2  Av.2 %  Min: 96 %  Max: 100 %  24HR INTAKE/OUTPUT:      Intake/Output Summary (Last 24 hours) at 2021 0907  Last data filed at 2021 0616  Gross per 24 hour   Intake 3018.21 ml   Output 1550 ml   Net 1468. 21 ml       CONSTITUTIONAL:  awake, alert, cooperative, no apparent distress, HEENT oral pharynx , no scleral icterus  HEMATOLOGIC/LYMPHATICS:  no cervical lymphadenopathy, no supraclavicular lymphadenopathy, no axillary lymphadenopathy and no inguinal lymphadenopathy  LUNGS:  No increased work of breathing, good air exchange, clear to auscultation bilaterally, no crackles or wheezing  CARDIOVASCULAR:  , regular rate and rhythm, normal S1 and S2, no S3 or S4, and no murmur noted  ABDOMEN:  No scars, normal bowel sounds, soft, non-distended, non-tender, no masses palpated, no hepatosplenomegally  MUSCULOSKELETAL:  There is no redness, warmth, or swelling of the joints. EXTREMETIES: No clubbing cynosis or edema  NEUROLOGIC:  Awake, alert, oriented to name, place and time. Cranial nerves II-XII are grossly intact. Motor is 5 out of 5 bilaterally.    SKIN:  no bruising or bleeding      Data      Recent Labs     21  0951 21  1155 21  0622   WBC 17.4* 17.8* 10.6   HGB 6.9* 8.6* 8.4*   HCT 20.1* 25.2* 24.5*    183 197   MCV 98.4 97.0 98.2        Recent Labs     21  0951 21  1155 21  0622  136 134*   K 3.9 3.8 4.4    101 103   CO2 25 26 25   BUN 8 11 10   CREATININE <0.5* 0.8* 0.6*     Recent Labs     01/06/21  0951 01/07/21  1155 01/08/21  0622   AST 31 23 19   ALT 11 9* 8*   BILITOT 5.1* 4.7* 4.3*   ALKPHOS 90 76 74       Magnesium:    Lab Results   Component Value Date    MG 2.10 01/08/2021    MG 2.00 01/07/2021    MG 1.80 01/06/2021         Problem List  Patient Active Problem List   Diagnosis    S[facility] crisis ([facility] [address]    Asthma    Sickle cell crisis ([facility] [address] [facility]    Sepsis ([facility] [address] [facility]    Opiate overdose ([facility] [address] [facility]    Opiate antagonist poisoning, accidental or unintentional, initial encounter    Leukocytosis    Hypoxia    Anemia    Other chest pain    Pneumonia due to infectious organism    Fever    Chronic prescription opiate use       ASSESSMENT AND PLAN:      Sickle cell crisis:  -No evidence of acute chest pain syndrome  -This is typical for his pain crises  -We will resume his OxyContin 20 mg p.o. every 12  -Continue as needed oxycodone 5-10 mg PRN   -We will supplement this with IV Dilaudid  -We will also add Toradol 30 mg IV every 8 for 9 doses  -We will increase his Dilaudid as needed and watch for respiratory depression- increase to 1 mg- stable   -We will continue his Hydrea  - No plans for exchange transfusion at this time   -He looks much better today and states his pain has been dramatically improved with Toradol       Pain management:  -This has been a problem with compliance  -His parents have been in to help with this  -He seems to be having more trouble lately and this could be because he is aging  -He has been started on crizanilizumab, but missed his last dose and hopefully this will improve his quality of life  -IV fluids will be administered  -s/p 2 units PRBC 1/7 with repeat H/H pending   - Increase Dilaudid to 1 mg PRN IVP   -Overall, his pain is much better     Fever:  -I agree with empiric Zosyn and vancomycin  -COVID-19 and influenza are negative -He does not look septic  -Blood cultures are still pending         ONCOLOGIC DISPOSITION:     -Once his pain is controlled and blood cultures have returned      Shaheen Freitas MD  May be reached through [facility]

## 2021-01-08 NOTE — PROGRESS NOTES
Neck: Supple, with full range of motion. No jugular venous distention. Trachea midline. Respiratory:  Normal respiratory effort. Clear to auscultation, bilaterally without Rales/Wheezes/Rhonchi. Cardiovascular: Regular rate and rhythm with normal S1/S2 without murmurs, rubs or gallops. Abdomen: Soft, non-tender, non-distended with normal bowel sounds. Musculoskeletal: No clubbing, cyanosis or edema bilaterally. Full range of motion without deformity. Skin: Skin color, texture, turgor normal.  No rashes or lesions. Neurologic:  Neurovascularly intact without any focal sensory/motor deficits. Cranial nerves: II-XII intact, grossly non-focal.  Psychiatric: Alert and oriented, thought content appropriate, normal insight  Capillary Refill: Brisk,< 3 seconds   Peripheral Pulses: +2 palpable, equal bilaterally       Labs:   Recent Labs     01/05/21  1515 01/06/21  0951 01/07/21  1155   WBC 25.2* 17.4* 17.8*   HGB 8.2* 6.9* 8.6*   HCT 23.3* 20.1* 25.2*    186 183     Recent Labs     01/05/21  1515 01/06/21  0951 01/07/21  1155   * 136 136   K 4.4 3.9 3.8    102 101   CO2 25 25 26   BUN 6* 8 11   CREATININE 0.7* <0.5* 0.8*   CALCIUM 9.2 9.2 9.5     Recent Labs     01/05/21  1515 01/06/21  0951 01/07/21  1155   AST 49* 31 23   ALT 14 11 9*   BILITOT 3.3* 5.1* 4.7*   ALKPHOS 109 90 76     Urinalysis:    Lab Results   Component Value Date    NITRU Negative 01/05/2021    WBCUA None seen 01/05/2021    RBCUA 0-2 01/05/2021    BLOODU SMALL 01/05/2021    SPECGRAV 1.010 01/05/2021    GLUCOSEU Negative 01/05/2021       Radiology:  CT CHEST PULMONARY EMBOLISM W CONTRAST   Final Result   1. Negative for acute pulmonary embolism   2. Mild ground-glass opacities seen peripherally in the lung bases.   Although   this may represent changes from a previous infectious or inflammatory   process, atypical pneumonia could give this appearance         CT HEAD WO CONTRAST   Final Result No acute intracranial abnormality. No change from prior study         XR CHEST PORTABLE   Final Result   Stable cardiomegaly           Active Hospital Problems    Diagnosis Date Noted    Fever [R50.9] 01/05/2021    Hypoxia [R09.02] 12/02/2020    Sickle cell crisis ([facility] [address] [D57.00] 07/28/2020     Assessment/Plan:  Sickle cell pain crisis  -  Will attempt pain control with combination or oxycodone and IV dilaudid for breakthrough. -  Continue home hydroxyurea, folic acid, and X43.  -  Supplemental O2 as needed. -  Received ~ 2.5L LR as a bolus in the ER. Continue with LR @ 125mL/hr for maintenance. -  Transfuse prn Hgb < 7 g/dL. -Hematology consulted to assist with management- S/p 2 u PRBC transfusion on 1/7/21     Fever (POA)   -  No apparent infectious source. COVID-19 NAAT and Influenza A/B Ag negative. Procalcitonin = 0.66.  -  Received empiric zosyn and vancomycin in the ER. Continued pending cultures      DVT Prophylaxis: Lovenox  Diet: DIET GENERAL;  Code Status: Full Code    PT/OT Eval Status: Ambulatory  at baseline    Dispo - Likely few days pending Clinical improvement        The note was completed using Dragon -speech recognition software & EMR  . Every effort was made to ensure accuracy; however, inadvertent computerized transcription errors may be present.     Chemo Ellis MD

## 2021-01-08 NOTE — PLAN OF CARE
Problem: Safety:  Goal: Free from accidental physical injury  Description: Free from accidental physical injury  1/7/2021 1957 by Meghann Velazquez RN  Outcome: Ongoing     Problem: Pain:  Goal: Patient's pain/discomfort is manageable  Description: Patient's pain/discomfort is manageable  Outcome: Ongoing     Problem: Skin Integrity:  Goal: Skin integrity will stabilize  Description: Skin integrity will stabilize  Outcome: Ongoing     Problem: Discharge Planning:  Goal: Patients continuum of care needs are met  Description: Patients continuum of care needs are met  Outcome: Ongoing

## 2021-01-09 ENCOUNTER — APPOINTMENT (OUTPATIENT)
Dept: GENERAL RADIOLOGY | Age: 21
DRG: 812 | End: 2021-01-09
Payer: COMMERCIAL

## 2021-01-09 LAB
A/G RATIO: 1 (ref 1.1–2.2)
ALBUMIN SERPL-MCNC: 4.1 G/DL (ref 3.4–5)
ALP BLD-CCNC: 86 U/L (ref 40–129)
ALT SERPL-CCNC: 13 U/L (ref 10–40)
ANION GAP SERPL CALCULATED.3IONS-SCNC: 14 MMOL/L (ref 3–16)
AST SERPL-CCNC: 24 U/L (ref 15–37)
BASOPHILS ABSOLUTE: 0.3 K/UL (ref 0–0.2)
BASOPHILS RELATIVE PERCENT: 1.7 %
BILIRUB SERPL-MCNC: 3 MG/DL (ref 0–1)
BLOOD BANK DISPENSE STATUS: NORMAL
BLOOD BANK PRODUCT CODE: NORMAL
BLOOD CULTURE, ROUTINE: NORMAL
BPU ID: NORMAL
BUN BLDV-MCNC: 9 MG/DL (ref 7–20)
CALCIUM SERPL-MCNC: 10.1 MG/DL (ref 8.3–10.6)
CHLORIDE BLD-SCNC: 100 MMOL/L (ref 99–110)
CO2: 21 MMOL/L (ref 21–32)
CREAT SERPL-MCNC: 0.7 MG/DL (ref 0.9–1.3)
CULTURE, BLOOD 2: NORMAL
DESCRIPTION BLOOD BANK: NORMAL
EOSINOPHILS ABSOLUTE: 0.1 K/UL (ref 0–0.6)
EOSINOPHILS RELATIVE PERCENT: 0.8 %
GFR AFRICAN AMERICAN: >60
GFR NON-AFRICAN AMERICAN: >60
GLOBULIN: 4.2 G/DL
GLUCOSE BLD-MCNC: 79 MG/DL (ref 70–99)
HCT VFR BLD CALC: 23.6 % (ref 40.5–52.5)
HEMOGLOBIN: 7.8 G/DL (ref 13.5–17.5)
LYMPHOCYTES ABSOLUTE: 1.6 K/UL (ref 1–5.1)
LYMPHOCYTES RELATIVE PERCENT: 9.4 %
MAGNESIUM: 2.2 MG/DL (ref 1.8–2.4)
MCH RBC QN AUTO: 32.3 PG (ref 26–34)
MCHC RBC AUTO-ENTMCNC: 33.1 G/DL (ref 31–36)
MCV RBC AUTO: 97.6 FL (ref 80–100)
MONOCYTES ABSOLUTE: 1.1 K/UL (ref 0–1.3)
MONOCYTES RELATIVE PERCENT: 6.2 %
NEUTROPHILS ABSOLUTE: 14.3 K/UL (ref 1.7–7.7)
NEUTROPHILS RELATIVE PERCENT: 81.9 %
PDW BLD-RTO: 17.9 % (ref 12.4–15.4)
PLATELET # BLD: 261 K/UL (ref 135–450)
PMV BLD AUTO: 8.7 FL (ref 5–10.5)
POTASSIUM SERPL-SCNC: 5 MMOL/L (ref 3.5–5.1)
RBC # BLD: 2.41 M/UL (ref 4.2–5.9)
REASON FOR REJECTION: NORMAL
REJECTED TEST: NORMAL
SODIUM BLD-SCNC: 135 MMOL/L (ref 136–145)
TOTAL PROTEIN: 8.3 G/DL (ref 6.4–8.2)
VANCOMYCIN TROUGH: 21.4 UG/ML (ref 10–20)
WBC # BLD: 17.4 K/UL (ref 4–11)

## 2021-01-09 PROCEDURE — 2580000003 HC RX 258: Performed by: INTERNAL MEDICINE

## 2021-01-09 PROCEDURE — 83735 ASSAY OF MAGNESIUM: CPT

## 2021-01-09 PROCEDURE — 6360000002 HC RX W HCPCS: Performed by: NURSE PRACTITIONER

## 2021-01-09 PROCEDURE — 36415 COLL VENOUS BLD VENIPUNCTURE: CPT

## 2021-01-09 PROCEDURE — 6370000000 HC RX 637 (ALT 250 FOR IP): Performed by: INTERNAL MEDICINE

## 2021-01-09 PROCEDURE — 6360000002 HC RX W HCPCS: Performed by: INTERNAL MEDICINE

## 2021-01-09 PROCEDURE — 80202 ASSAY OF VANCOMYCIN: CPT

## 2021-01-09 PROCEDURE — 73501 X-RAY EXAM HIP UNI 1 VIEW: CPT

## 2021-01-09 PROCEDURE — 80053 COMPREHEN METABOLIC PANEL: CPT

## 2021-01-09 PROCEDURE — 85025 COMPLETE CBC W/AUTO DIFF WBC: CPT

## 2021-01-09 PROCEDURE — 73030 X-RAY EXAM OF SHOULDER: CPT

## 2021-01-09 PROCEDURE — 1200000000 HC SEMI PRIVATE

## 2021-01-09 PROCEDURE — 6370000000 HC RX 637 (ALT 250 FOR IP): Performed by: NURSE PRACTITIONER

## 2021-01-09 RX ADMIN — ACETAMINOPHEN 650 MG: 325 TABLET ORAL at 09:23

## 2021-01-09 RX ADMIN — HYDROMORPHONE HYDROCHLORIDE 1 MG: 2 INJECTION INTRAMUSCULAR; INTRAVENOUS; SUBCUTANEOUS at 23:30

## 2021-01-09 RX ADMIN — VANCOMYCIN HYDROCHLORIDE 1250 MG: 10 INJECTION, POWDER, LYOPHILIZED, FOR SOLUTION INTRAVENOUS at 01:55

## 2021-01-09 RX ADMIN — OXYCODONE HYDROCHLORIDE 20 MG: 20 TABLET, FILM COATED, EXTENDED RELEASE ORAL at 09:15

## 2021-01-09 RX ADMIN — OXYCODONE HYDROCHLORIDE 10 MG: 5 TABLET ORAL at 18:24

## 2021-01-09 RX ADMIN — HYDROMORPHONE HYDROCHLORIDE 1 MG: 2 INJECTION INTRAMUSCULAR; INTRAVENOUS; SUBCUTANEOUS at 09:15

## 2021-01-09 RX ADMIN — HYDROXYUREA 1000 MG: 500 CAPSULE ORAL at 12:20

## 2021-01-09 RX ADMIN — CEFEPIME 2 G: 2 INJECTION, POWDER, FOR SOLUTION INTRAMUSCULAR; INTRAVENOUS at 09:14

## 2021-01-09 RX ADMIN — OXYCODONE HYDROCHLORIDE 10 MG: 5 TABLET ORAL at 03:07

## 2021-01-09 RX ADMIN — OXYCODONE HYDROCHLORIDE 10 MG: 5 TABLET ORAL at 12:21

## 2021-01-09 RX ADMIN — CEFEPIME 2 G: 2 INJECTION, POWDER, FOR SOLUTION INTRAMUSCULAR; INTRAVENOUS at 20:50

## 2021-01-09 RX ADMIN — HYDROMORPHONE HYDROCHLORIDE 1 MG: 2 INJECTION INTRAMUSCULAR; INTRAVENOUS; SUBCUTANEOUS at 01:01

## 2021-01-09 RX ADMIN — VANCOMYCIN HYDROCHLORIDE 1250 MG: 10 INJECTION, POWDER, LYOPHILIZED, FOR SOLUTION INTRAVENOUS at 17:01

## 2021-01-09 RX ADMIN — ACETAMINOPHEN 650 MG: 325 TABLET ORAL at 20:29

## 2021-01-09 RX ADMIN — HYDROMORPHONE HYDROCHLORIDE 1 MG: 2 INJECTION INTRAMUSCULAR; INTRAVENOUS; SUBCUTANEOUS at 05:51

## 2021-01-09 RX ADMIN — HYDROMORPHONE HYDROCHLORIDE 1 MG: 2 INJECTION INTRAMUSCULAR; INTRAVENOUS; SUBCUTANEOUS at 17:01

## 2021-01-09 RX ADMIN — OXYCODONE HYDROCHLORIDE 20 MG: 20 TABLET, FILM COATED, EXTENDED RELEASE ORAL at 20:29

## 2021-01-09 RX ADMIN — FOLIC ACID 2 MG: 1 TABLET ORAL at 09:15

## 2021-01-09 RX ADMIN — CYANOCOBALAMIN TAB 500 MCG 1000 MCG: 500 TAB at 09:15

## 2021-01-09 RX ADMIN — HYDROMORPHONE HYDROCHLORIDE 1 MG: 2 INJECTION INTRAMUSCULAR; INTRAVENOUS; SUBCUTANEOUS at 13:53

## 2021-01-09 RX ADMIN — HYDROMORPHONE HYDROCHLORIDE 1 MG: 2 INJECTION INTRAMUSCULAR; INTRAVENOUS; SUBCUTANEOUS at 20:29

## 2021-01-09 RX ADMIN — VANCOMYCIN HYDROCHLORIDE 1250 MG: 10 INJECTION, POWDER, LYOPHILIZED, FOR SOLUTION INTRAVENOUS at 09:14

## 2021-01-09 RX ADMIN — Medication 10 ML: at 09:16

## 2021-01-09 RX ADMIN — KETOROLAC TROMETHAMINE 30 MG: 30 INJECTION, SOLUTION INTRAMUSCULAR at 00:11

## 2021-01-09 RX ADMIN — HYDROXYUREA 1000 MG: 500 CAPSULE ORAL at 20:30

## 2021-01-09 ASSESSMENT — PAIN SCALES - GENERAL
PAINLEVEL_OUTOF10: 7
PAINLEVEL_OUTOF10: 3
PAINLEVEL_OUTOF10: 6
PAINLEVEL_OUTOF10: 8
PAINLEVEL_OUTOF10: 10
PAINLEVEL_OUTOF10: 8
PAINLEVEL_OUTOF10: 8

## 2021-01-09 NOTE — PROGRESS NOTES
Hospitalist Progress Note      PCP: Miguel Angel Person MD    Date of Admission: 1/5/2021    Chief Complaint: Sickle cell pain crisis    Hospital Course: Reviewed H&P    Subjective: Patient currently resting in bed . Still requiring IV pain meds per RN . hematology  eval pending this morning ; Continue current  pain regimen. S/p 2 u PRBC transfusion ON 1/7/21 and  Labs pending  . Medications:  Reviewed    Infusion Medications    sodium chloride      dextrose 5% and 0.45% NaCl with KCl 20 mEq 75 mL/hr at 01/08/21 1700    sodium chloride Stopped (01/07/21 0330)     Scheduled Medications    cefepime  2 g Intravenous Q12H    vancomycin  1,250 mg Intravenous Q8H    acetaminophen  650 mg Oral Once    diphenhydrAMINE  25 mg Oral Once    oxyCODONE  20 mg Oral 2 times per day    sodium chloride flush  10 mL Intravenous 2 times per day    folic acid  2 mg Oral Daily    hydroxyurea  1,000 mg Oral BID    cyanocobalamin  1,000 mcg Oral Daily    enoxaparin  40 mg Subcutaneous Daily     PRN Meds: sodium chloride, HYDROmorphone, oxyCODONE **OR** oxyCODONE, sodium chloride, sodium chloride flush, sodium chloride flush, potassium chloride **OR** potassium alternative oral replacement **OR** potassium chloride, magnesium sulfate, promethazine **OR** ondansetron, acetaminophen **OR** acetaminophen, melatonin      Intake/Output Summary (Last 24 hours) at 1/9/2021 1510  Last data filed at 1/9/2021 1501  Gross per 24 hour   Intake 2678 ml   Output 2300 ml   Net 378 ml       Physical Exam Performed:  /61   Pulse 67   Temp 97.8 °F (36.6 °C) (Oral)   Resp 16   Ht 5' 8\" (1.727 m)   Wt 178 lb 8 oz (81 kg)   SpO2 99%   BMI 27.14 kg/m²     General appearance: AAM in No apparent distress, appears stated age and cooperative. HEENT: Pupils equal, round, and reactive to light. Conjunctivae/corneas clear. Neck: Supple, with full range of motion. No jugular venous distention. Trachea midline. Respiratory:  Normal respiratory effort. Clear to auscultation, bilaterally without Rales/Wheezes/Rhonchi. Cardiovascular: Regular rate and rhythm with normal S1/S2 without murmurs, rubs or gallops. Abdomen: Soft, non-tender, non-distended with normal bowel sounds. Musculoskeletal: No clubbing, cyanosis or edema bilaterally. Full range of motion without deformity. Skin: Skin color, texture, turgor normal.  No rashes or lesions. Neurologic:  Neurovascularly intact without any focal sensory/motor deficits. Cranial nerves: II-XII intact, grossly non-focal.  Psychiatric: Alert and oriented, thought content appropriate, normal insight  Capillary Refill: Brisk,< 3 seconds   Peripheral Pulses: +2 palpable, equal bilaterally       Labs:   Recent Labs     01/07/21 1155 01/08/21  0622 01/09/21  0948   WBC 17.8* 10.6 17.4*   HGB 8.6* 8.4* 7.8*   HCT 25.2* 24.5* 23.6*    197 261     Recent Labs     01/07/21  1155 01/08/21  0622    134*   K 3.8 4.4    103   CO2 26 25   BUN 11 10   CREATININE 0.8* 0.6*   CALCIUM 9.5 9.2     Recent Labs     01/07/21  1155 01/08/21  0622   AST 23 19   ALT 9* 8*   BILITOT 4.7* 4.3*   ALKPHOS 76 74     Urinalysis:    Lab Results   Component Value Date    NITRU Negative 01/05/2021    WBCUA None seen 01/05/2021    RBCUA 0-2 01/05/2021    BLOODU SMALL 01/05/2021    SPECGRAV 1.010 01/05/2021    GLUCOSEU Negative 01/05/2021       Radiology:  XR HIP RIGHT (1 VIEW)   Preliminary Result   No acute abnormality of the right hip. XR SHOULDER RIGHT (MIN 2 VIEWS)   Final Result   No significant finding in the right shoulder. CT CHEST PULMONARY EMBOLISM W CONTRAST   Final Result   1. Negative for acute pulmonary embolism   2. Mild ground-glass opacities seen peripherally in the lung bases.   Although   this may represent changes from a previous infectious or inflammatory   process, atypical pneumonia could give this appearance         CT HEAD WO CONTRAST   Final Result

## 2021-01-09 NOTE — PROGRESS NOTES
ONCOLOGY HEMATOLOGY CARE PROGRESS NOTE      SUBJECTIVE:     Pt is having a low grade fever again this AM. He is complaining of worsening pain in the R shoulder and R hip. AM CBC is pending. ROS:   The remaining 10 point review of symptoms is unremarkable. OBJECTIVE        Physical    VITALS:  /65   Pulse 90   Temp 98.3 °F (36.8 °C) (Oral)   Resp 18   Ht 5' 8\" (1.727 m)   Wt 178 lb 8 oz (81 kg)   SpO2 98%   BMI 27.14 kg/m²   TEMPERATURE:  Current - Temp: 98.3 °F (36.8 °C); Max - Temp  Av.1 °F (36.7 °C)  Min: 97.9 °F (36.6 °C)  Max: 98.3 °F (36.8 °C)  PULSE OXIMETRY RANGE: SpO2  Av.6 %  Min: 97 %  Max: 98 %  24HR INTAKE/OUTPUT:      Intake/Output Summary (Last 24 hours) at 2021 0911  Last data filed at 2021 0551  Gross per 24 hour   Intake 2030 ml   Output 1800 ml   Net 230 ml       CONSTITUTIONAL:  awake, alert, cooperative, no apparent distress, HEENT oral pharynx , no scleral icterus  HEMATOLOGIC/LYMPHATICS:  no cervical lymphadenopathy, no supraclavicular lymphadenopathy, no axillary lymphadenopathy and no inguinal lymphadenopathy  LUNGS:  No increased work of breathing, good air exchange, clear to auscultation bilaterally, no crackles or wheezing  CARDIOVASCULAR:  , regular rate and rhythm, normal S1 and S2, no S3 or S4, and no murmur noted  ABDOMEN:  No scars, normal bowel sounds, soft, non-distended, non-tender, no masses palpated, no hepatosplenomegally  MUSCULOSKELETAL:  There is no redness, warmth, or swelling of the joints. EXTREMETIES: No clubbing cynosis or edema  NEUROLOGIC:  Awake, alert, oriented to name, place and time. Cranial nerves II-XII are grossly intact. Motor is 5 out of 5 bilaterally.    SKIN:  no bruising or bleeding      Data      Recent Labs     21  0951 21  1155 21  0622   WBC 17.4* 17.8* 10.6   HGB 6.9* 8.6* 8.4*   HCT 20.1* 25.2* 24.5*    183 197   MCV 98.4 97.0 98.2        Recent Labs 01/06/21  0951 01/07/21  1155 01/08/21  0622    136 134*   K 3.9 3.8 4.4    101 103   CO2 25 26 25   BUN 8 11 10   CREATININE <0.5* 0.8* 0.6*     Recent Labs     01/06/21  0951 01/07/21  1155 01/08/21  0622   AST 31 23 19   ALT 11 9* 8*   BILITOT 5.1* 4.7* 4.3*   ALKPHOS 90 76 74       Magnesium:    Lab Results   Component Value Date    MG 2.10 01/08/2021    MG 2.00 01/07/2021    MG 1.80 01/06/2021         Problem List  Patient Active Problem List   Diagnosis    S[facility] pain crisis ([facility] [address]    Asthma    Sickle cell crisis ([facility] [address]    Sepsis ([facility] [address]    Opiate overdose ([facility] [address]    Opiate antagonist poisoning, accidental or unintentional, initial encounter    Leukocytosis    Hypoxia    Anemia    Other chest pain    Pneumonia due to infectious organism    Fever    Chronic prescription opiate use       ASSESSMENT AND PLAN:      Sickle cell crisis:  -No evidence of acute chest pain syndrome  -This is typical for his pain crises  -We will resume his OxyContin 20 mg p.o. every 12  -Continue as needed oxycodone 5-10 mg PRN   -We will supplement this with IV Dilaudid  -added Toradol 30 mg IV every 8 for 9 doses   -We will continue his Hydrea  - No plans for exchange transfusion at this time   -Pain worsening today and having low grade fever  - will get XR of hip and shoulder       Pain management:  -This has been a problem with compliance  -His parents have been in to help with this  -He seems to be having more trouble lately and this could be because he is aging  -He has been started on crizanilizumab, but missed his last dose and hopefully this will improve his quality of life  -IV fluids will be administered  -s/p 2 units PRBC 1/7 with repeat H/H pending   - Increased Dilaudid to 1 mg PRN IVP      Fever:  -I agree with empiric Zosyn and vancomycin  -COVID-19 and influenza are negative  -He does not look septic  -Blood cultures are NGTD  -He has a low grade fever again today      ONCOLOGIC DISPOSITION: -Once his pain is controlled     Christian Anguiano MD

## 2021-01-10 LAB
A/G RATIO: 0.9 (ref 1.1–2.2)
ALBUMIN SERPL-MCNC: 3.8 G/DL (ref 3.4–5)
ALP BLD-CCNC: 84 U/L (ref 40–129)
ALT SERPL-CCNC: 14 U/L (ref 10–40)
ANION GAP SERPL CALCULATED.3IONS-SCNC: 11 MMOL/L (ref 3–16)
ANISOCYTOSIS: ABNORMAL
AST SERPL-CCNC: 27 U/L (ref 15–37)
BASOPHILS ABSOLUTE: 0.2 K/UL (ref 0–0.2)
BASOPHILS RELATIVE PERCENT: 1.4 %
BILIRUB SERPL-MCNC: 3.1 MG/DL (ref 0–1)
BUN BLDV-MCNC: 9 MG/DL (ref 7–20)
CALCIUM SERPL-MCNC: 10 MG/DL (ref 8.3–10.6)
CHLORIDE BLD-SCNC: 103 MMOL/L (ref 99–110)
CO2: 20 MMOL/L (ref 21–32)
CREAT SERPL-MCNC: 0.6 MG/DL (ref 0.9–1.3)
EOSINOPHILS ABSOLUTE: 0.1 K/UL (ref 0–0.6)
EOSINOPHILS RELATIVE PERCENT: 0.5 %
GFR AFRICAN AMERICAN: >60
GFR NON-AFRICAN AMERICAN: >60
GLOBULIN: 4.1 G/DL
GLUCOSE BLD-MCNC: 87 MG/DL (ref 70–99)
HCT VFR BLD CALC: 27.4 % (ref 40.5–52.5)
HEMATOLOGY PATH CONSULT: NO
HEMOGLOBIN: 8.7 G/DL (ref 13.5–17.5)
LYMPHOCYTES ABSOLUTE: 3.5 K/UL (ref 1–5.1)
LYMPHOCYTES RELATIVE PERCENT: 19.7 %
MACROCYTES: ABNORMAL
MAGNESIUM: 2.1 MG/DL (ref 1.8–2.4)
MCH RBC QN AUTO: 32.5 PG (ref 26–34)
MCHC RBC AUTO-ENTMCNC: 31.9 G/DL (ref 31–36)
MCV RBC AUTO: 101.9 FL (ref 80–100)
MICROCYTES: ABNORMAL
MONOCYTES ABSOLUTE: 1.2 K/UL (ref 0–1.3)
MONOCYTES RELATIVE PERCENT: 6.9 %
NEUTROPHILS ABSOLUTE: 12.6 K/UL (ref 1.7–7.7)
NEUTROPHILS RELATIVE PERCENT: 71.5 %
PDW BLD-RTO: 18.3 % (ref 12.4–15.4)
PLATELET # BLD: ABNORMAL K/UL (ref 135–450)
PLATELET SLIDE REVIEW: ABNORMAL
PMV BLD AUTO: ABNORMAL FL (ref 5–10.5)
POIKILOCYTES: ABNORMAL
POTASSIUM SERPL-SCNC: 4.6 MMOL/L (ref 3.5–5.1)
RBC # BLD: 2.69 M/UL (ref 4.2–5.9)
SICKLE CELLS: ABNORMAL
SLIDE REVIEW: ABNORMAL
SODIUM BLD-SCNC: 134 MMOL/L (ref 136–145)
TOTAL PROTEIN: 7.9 G/DL (ref 6.4–8.2)
VANCOMYCIN TROUGH: 11.3 UG/ML (ref 10–20)
WBC # BLD: 17.6 K/UL (ref 4–11)

## 2021-01-10 PROCEDURE — U0003 INFECTIOUS AGENT DETECTION BY NUCLEIC ACID (DNA OR RNA); SEVERE ACUTE RESPIRATORY SYNDROME CORONAVIRUS 2 (SARS-COV-2) (CORONAVIRUS DISEASE [COVID-19]), AMPLIFIED PROBE TECHNIQUE, MAKING USE OF HIGH THROUGHPUT TECHNOLOGIES AS DESCRIBED BY CMS-2020-01-R: HCPCS

## 2021-01-10 PROCEDURE — 6370000000 HC RX 637 (ALT 250 FOR IP): Performed by: INTERNAL MEDICINE

## 2021-01-10 PROCEDURE — 80053 COMPREHEN METABOLIC PANEL: CPT

## 2021-01-10 PROCEDURE — 80202 ASSAY OF VANCOMYCIN: CPT

## 2021-01-10 PROCEDURE — 6370000000 HC RX 637 (ALT 250 FOR IP): Performed by: NURSE PRACTITIONER

## 2021-01-10 PROCEDURE — 1200000000 HC SEMI PRIVATE

## 2021-01-10 PROCEDURE — 6360000002 HC RX W HCPCS: Performed by: NURSE PRACTITIONER

## 2021-01-10 PROCEDURE — 2580000003 HC RX 258: Performed by: INTERNAL MEDICINE

## 2021-01-10 PROCEDURE — 36415 COLL VENOUS BLD VENIPUNCTURE: CPT

## 2021-01-10 PROCEDURE — 6360000002 HC RX W HCPCS: Performed by: INTERNAL MEDICINE

## 2021-01-10 PROCEDURE — 83735 ASSAY OF MAGNESIUM: CPT

## 2021-01-10 PROCEDURE — 85025 COMPLETE CBC W/AUTO DIFF WBC: CPT

## 2021-01-10 RX ADMIN — CYANOCOBALAMIN TAB 500 MCG 1000 MCG: 500 TAB at 08:53

## 2021-01-10 RX ADMIN — CEFEPIME 2 G: 2 INJECTION, POWDER, FOR SOLUTION INTRAMUSCULAR; INTRAVENOUS at 08:50

## 2021-01-10 RX ADMIN — HYDROMORPHONE HYDROCHLORIDE 1 MG: 2 INJECTION INTRAMUSCULAR; INTRAVENOUS; SUBCUTANEOUS at 22:17

## 2021-01-10 RX ADMIN — OXYCODONE HYDROCHLORIDE 20 MG: 20 TABLET, FILM COATED, EXTENDED RELEASE ORAL at 08:53

## 2021-01-10 RX ADMIN — HYDROMORPHONE HYDROCHLORIDE 1 MG: 2 INJECTION INTRAMUSCULAR; INTRAVENOUS; SUBCUTANEOUS at 08:50

## 2021-01-10 RX ADMIN — VANCOMYCIN HYDROCHLORIDE 1250 MG: 10 INJECTION, POWDER, LYOPHILIZED, FOR SOLUTION INTRAVENOUS at 12:08

## 2021-01-10 RX ADMIN — HYDROMORPHONE HYDROCHLORIDE 1 MG: 2 INJECTION INTRAMUSCULAR; INTRAVENOUS; SUBCUTANEOUS at 02:32

## 2021-01-10 RX ADMIN — HYDROMORPHONE HYDROCHLORIDE 1 MG: 2 INJECTION INTRAMUSCULAR; INTRAVENOUS; SUBCUTANEOUS at 05:35

## 2021-01-10 RX ADMIN — VANCOMYCIN HYDROCHLORIDE 1250 MG: 10 INJECTION, POWDER, LYOPHILIZED, FOR SOLUTION INTRAVENOUS at 19:00

## 2021-01-10 RX ADMIN — Medication 10 ML: at 08:54

## 2021-01-10 RX ADMIN — HYDROXYUREA 1000 MG: 500 CAPSULE ORAL at 12:08

## 2021-01-10 RX ADMIN — HYDROXYUREA 1000 MG: 500 CAPSULE ORAL at 22:24

## 2021-01-10 RX ADMIN — VANCOMYCIN HYDROCHLORIDE 1250 MG: 10 INJECTION, POWDER, LYOPHILIZED, FOR SOLUTION INTRAVENOUS at 03:10

## 2021-01-10 RX ADMIN — OXYCODONE HYDROCHLORIDE 10 MG: 5 TABLET ORAL at 16:52

## 2021-01-10 RX ADMIN — HYDROMORPHONE HYDROCHLORIDE 1 MG: 2 INJECTION INTRAMUSCULAR; INTRAVENOUS; SUBCUTANEOUS at 12:08

## 2021-01-10 RX ADMIN — OXYCODONE HYDROCHLORIDE 20 MG: 20 TABLET, FILM COATED, EXTENDED RELEASE ORAL at 22:17

## 2021-01-10 RX ADMIN — CEFEPIME 2 G: 2 INJECTION, POWDER, FOR SOLUTION INTRAMUSCULAR; INTRAVENOUS at 22:17

## 2021-01-10 RX ADMIN — HYDROMORPHONE HYDROCHLORIDE 1 MG: 2 INJECTION INTRAMUSCULAR; INTRAVENOUS; SUBCUTANEOUS at 19:00

## 2021-01-10 RX ADMIN — FOLIC ACID 2 MG: 1 TABLET ORAL at 08:53

## 2021-01-10 RX ADMIN — HYDROMORPHONE HYDROCHLORIDE 1 MG: 2 INJECTION INTRAMUSCULAR; INTRAVENOUS; SUBCUTANEOUS at 15:25

## 2021-01-10 RX ADMIN — OXYCODONE HYDROCHLORIDE 10 MG: 5 TABLET ORAL at 01:16

## 2021-01-10 ASSESSMENT — PAIN SCALES - GENERAL
PAINLEVEL_OUTOF10: 8
PAINLEVEL_OUTOF10: 8
PAINLEVEL_OUTOF10: 7
PAINLEVEL_OUTOF10: 9
PAINLEVEL_OUTOF10: 8
PAINLEVEL_OUTOF10: 7

## 2021-01-10 NOTE — PROGRESS NOTES
Hospitalist Progress Note      PCP: Jay Jay Dunne MD    Date of Admission: 1/5/2021    Chief Complaint: Sickle cell pain crisis    Hospital Course: Reviewed H&P    Subjective: Patient seen and examined this AM . Patient reports that he \"couldn't smell \" and a Rapid COIVD testing was obtained overnight and was negative . Patient continued to spike fevers with T-max 102. 6F overnight. Noted leukocytosis persistent. Patient denies any cough, shortness of breath and is currently saturating in high 90s on room air. Patient received a call from his sister who returned from [place] yesterday and tested positive. Patient reports he was not around her . Noted hematology requested for Covid PCR. Will put patient in droplet plus isolation until results come back. Still requiring IV pain meds per RN . Continue current  pain regimen per Hematology . Continue Empiric Vanco/Cefepime for now . Cultures negative thus far . S/p 2 u PRBC transfusion ON 1/7/21  .         Medications:  Reviewed    Infusion Medications    sodium chloride      dextrose 5% and 0.45% NaCl with KCl 20 mEq 75 mL/hr at 01/08/21 1700    sodium chloride Stopped (01/07/21 0330)     Scheduled Medications    cefepime  2 g Intravenous Q12H    vancomycin  1,250 mg Intravenous Q8H    acetaminophen  650 mg Oral Once    diphenhydrAMINE  25 mg Oral Once    oxyCODONE  20 mg Oral 2 times per day    sodium chloride flush  10 mL Intravenous 2 times per day    folic acid  2 mg Oral Daily    hydroxyurea  1,000 mg Oral BID    cyanocobalamin  1,000 mcg Oral Daily    enoxaparin  40 mg Subcutaneous Daily     PRN Meds: sodium chloride, HYDROmorphone, oxyCODONE **OR** oxyCODONE, sodium chloride, sodium chloride flush, sodium chloride flush, potassium chloride **OR** potassium alternative oral replacement **OR** potassium chloride, magnesium sulfate, promethazine **OR** ondansetron, acetaminophen **OR** acetaminophen, melatonin WBCUA None seen 01/05/2021    RBCUA 0-2 01/05/2021    BLOODU SMALL 01/05/2021    SPECGRAV 1.010 01/05/2021    GLUCOSEU Negative 01/05/2021       Radiology:  XR HIP RIGHT (1 VIEW)   Preliminary Result   No acute abnormality of the right hip. XR SHOULDER RIGHT (MIN 2 VIEWS)   Final Result   No significant finding in the right shoulder. CT CHEST PULMONARY EMBOLISM W CONTRAST   Final Result   1. Negative for acute pulmonary embolism   2. Mild ground-glass opacities seen peripherally in the lung bases. Although   this may represent changes from a previous infectious or inflammatory   process, atypical pneumonia could give this appearance         CT HEAD WO CONTRAST   Final Result   No acute intracranial abnormality. No change from prior study         XR CHEST PORTABLE   Final Result   Stable cardiomegaly           Active Hospital Problems    Diagnosis Date Noted    Fever [R50.9] 01/05/2021    Hypoxia [R09.02] 12/02/2020    Sickle cell crisis (HCC) [D57.00] 07/28/2020     Assessment/Plan:  Sickle cell pain crisis  -  Continue  pain control with combination or oxycodone and IV dilaudid for breakthrough (per HEMATOLOGY) . -  Continue home hydroxyurea, folic acid, and U12.  -  Supplemental O2 as needed. -  Received ~ 2.5L LR as a bolus in the ER. Continue with D50.9NS @ 75mL/hr for maintenance. -  Transfuse prn Hgb < 7 g/dL. -Hematology consulted to assist with management- S/p 2 u PRBC transfusion on 1/7/21     Fever (POA)   -  No apparent infectious source. COVID-19 NAAT and Influenza A/B Ag negative. Procalcitonin = 0.66.  -  Received empiric zosyn and vancomycin in the ER. Continued pending cultures -no growth to date  -Leukocytosis resolved . W/d/w hematology if can DC abx    - COVID PCR sent on 1/10/21 by Hematology .  Continue droplet plus isolation till results back .      DVT Prophylaxis: Lovenox  Diet: DIET GENERAL;  Code Status: Full Code PT/OT Eval Status: Ambulatory  at baseline    Dispo - Likely 1-2 days pending Pain control and Oked by Hematology        The note was completed using Dragon -speech recognition software & EMR  . Every effort was made to ensure accuracy; however, inadvertent computerized transcription errors may be present.     Evy Kilgore MD

## 2021-01-10 NOTE — PROGRESS NOTES
ONCOLOGY HEMATOLOGY CARE PROGRESS NOTE      SUBJECTIVE:     Still having fever up to 102. Still c/o shoulder and hip pain although thinks improved. He reports he thinks he lost his smell. His sister was COVID (+) yesterday but he states he has not been around her. ROS:   The remaining 10 point review of symptoms is unremarkable. OBJECTIVE        Physical    VITALS:  BP (!) 97/55   Pulse 87   Temp 98.8 °F (37.1 °C) (Oral)   Resp 24   Ht 5' 8\" (1.727 m)   Wt 175 lb 11.2 oz (79.7 kg)   SpO2 100%   BMI 26.72 kg/m²   TEMPERATURE:  Current - Temp: 98.8 °F (37.1 °C); Max - Temp  Av.7 °F (37.6 °C)  Min: 97.5 °F (36.4 °C)  Max: 102.8 °F (39.3 °C)  PULSE OXIMETRY RANGE: SpO2  Av.5 %  Min: 97 %  Max: 100 %  24HR INTAKE/OUTPUT:      Intake/Output Summary (Last 24 hours) at 1/10/2021 0945  Last data filed at 1/10/2021 0857  Gross per 24 hour   Intake 3371.57 ml   Output 2875 ml   Net 496.57 ml       CONSTITUTIONAL:  awake, alert, cooperative, no apparent distress, HEENT oral pharynx , no scleral icterus  HEMATOLOGIC/LYMPHATICS:  no cervical lymphadenopathy, no supraclavicular lymphadenopathy, no axillary lymphadenopathy and no inguinal lymphadenopathy  LUNGS:  No increased work of breathing, good air exchange, clear to auscultation bilaterally, no crackles or wheezing  CARDIOVASCULAR:  , regular rate and rhythm, normal S1 and S2, no S3 or S4, and no murmur noted  ABDOMEN:  No scars, normal bowel sounds, soft, non-distended, non-tender, no masses palpated, no hepatosplenomegally  MUSCULOSKELETAL:  There is no redness, warmth, or swelling of the joints. EXTREMETIES: No clubbing cynosis or edema  NEUROLOGIC:  Awake, alert, oriented to name, place and time. Cranial nerves II-XII are grossly intact. Motor is 5 out of 5 bilaterally.    SKIN:  no bruising or bleeding      Data      Recent Labs     21  0622 21  0948 01/10/21  0636   WBC 10.6 17.4* 17.6* HGB 8.4* 7.8* 8.7*   HCT 24.5* 23.6* 27.4*    261 see below   MCV 98.2 97.6 101.9*        Recent Labs     01/08/21  0622 01/09/21  1755 01/10/21  0636   * 135* 134*   K 4.4 5.0 4.6    100 103   CO2 25 21 20*   BUN 10 9 9   CREATININE 0.6* 0.7* 0.6*     Recent Labs     01/08/21  0622 01/09/21  1755 01/10/21  0636   AST 19 24 27   ALT 8* 13 14   BILITOT 4.3* 3.0* 3.1*   ALKPHOS 74 86 84       Magnesium:    Lab Results   Component Value Date    MG 2.10 01/10/2021    MG 2.20 01/09/2021    MG 2.10 01/08/2021     R Shoulder XR 1/9/21:  No significant finding in the right shoulder. R Hip XR 1/9/21:  No acute abnormality of the right hip.     Problem List  Patient Active Problem List   Diagnosis    [facility]    Asthma    S[facility] crisis ([facility] [facility]    Sepsis ([facility]    Opiate overdose ([facility] [facility]    Opiate antagonist poisoning, accidental or unintentional, initial encounter    Leukocytosis    Hypoxia    Anemia    Other chest pain    Pneumonia due to infectious organism    Fever    Chronic prescription opiate use       ASSESSMENT AND PLAN:      Sickle cell crisis:  -No evidence of acute chest pain syndrome  -This is typical for his pain crises  -We will resume his OxyContin 20 mg p.o. every 12  -Continue as needed oxycodone 5-10 mg PRN   -We will supplement this with IV Dilaudid  -added Toradol 30 mg IV every 8 for 9 doses   -We will continue his Hydrea  - No plans for exchange transfusion at this time   -Pain worsening over weekend and having fever  - XR of hip and shoulder unremarkable       Pain management:  -This has been a problem with compliance  -His parents have been in to help with this  -He seems to be having more trouble lately and this could be because he is aging  -He has been started on crizanilizumab, but missed his last dose and hopefully this will improve his quality of life  -IV fluids will be administered  -s/p 2 units PRBC 1/7 with repeat H/H pending - Increased Dilaudid to 1 mg PRN IVP      Fever:  -I agree with empiric cefepime and vancomycin  -COVID-19 and influenza are negative  -He does not look septic  -Blood cultures are NGTD  -He has been having fevers again  - consider ID assessment  - will get COVID PCR with complaint that he cannot smell anything    ONCOLOGIC DISPOSITION:     -Once his pain is controlled     Clemente Gamez MD

## 2021-01-10 NOTE — PLAN OF CARE
Pt remained free of falls. Call light within reach. a/ox4, calls out appropriately. Non skid footwear in place. Bed locked and in lowest position.

## 2021-01-11 LAB
A/G RATIO: 1 (ref 1.1–2.2)
ALBUMIN SERPL-MCNC: 3.8 G/DL (ref 3.4–5)
ALP BLD-CCNC: 82 U/L (ref 40–129)
ALT SERPL-CCNC: 14 U/L (ref 10–40)
ANION GAP SERPL CALCULATED.3IONS-SCNC: 10 MMOL/L (ref 3–16)
AST SERPL-CCNC: 24 U/L (ref 15–37)
BASOPHILS ABSOLUTE: 0.1 K/UL (ref 0–0.2)
BASOPHILS RELATIVE PERCENT: 1 %
BILIRUB SERPL-MCNC: 2.9 MG/DL (ref 0–1)
BUN BLDV-MCNC: 9 MG/DL (ref 7–20)
CALCIUM SERPL-MCNC: 9.9 MG/DL (ref 8.3–10.6)
CHLORIDE BLD-SCNC: 103 MMOL/L (ref 99–110)
CO2: 22 MMOL/L (ref 21–32)
CREAT SERPL-MCNC: 0.6 MG/DL (ref 0.9–1.3)
EOSINOPHILS ABSOLUTE: 0.1 K/UL (ref 0–0.6)
EOSINOPHILS RELATIVE PERCENT: 0.9 %
GFR AFRICAN AMERICAN: >60
GFR NON-AFRICAN AMERICAN: >60
GLOBULIN: 3.8 G/DL
GLUCOSE BLD-MCNC: 98 MG/DL (ref 70–99)
HCT VFR BLD CALC: 24.4 % (ref 40.5–52.5)
HEMOGLOBIN: 8 G/DL (ref 13.5–17.5)
LYMPHOCYTES ABSOLUTE: 3.3 K/UL (ref 1–5.1)
LYMPHOCYTES RELATIVE PERCENT: 24.8 %
MAGNESIUM: 2.1 MG/DL (ref 1.8–2.4)
MCH RBC QN AUTO: 32.4 PG (ref 26–34)
MCHC RBC AUTO-ENTMCNC: 32.7 G/DL (ref 31–36)
MCV RBC AUTO: 99.1 FL (ref 80–100)
MONOCYTES ABSOLUTE: 1.5 K/UL (ref 0–1.3)
MONOCYTES RELATIVE PERCENT: 10.8 %
NEUTROPHILS ABSOLUTE: 8.4 K/UL (ref 1.7–7.7)
NEUTROPHILS RELATIVE PERCENT: 62.5 %
PDW BLD-RTO: 17.4 % (ref 12.4–15.4)
PLATELET # BLD: 255 K/UL (ref 135–450)
PMV BLD AUTO: 9.4 FL (ref 5–10.5)
POTASSIUM SERPL-SCNC: 4.2 MMOL/L (ref 3.5–5.1)
RBC # BLD: 2.47 M/UL (ref 4.2–5.9)
SARS-COV-2, PCR: NOT DETECTED
SODIUM BLD-SCNC: 135 MMOL/L (ref 136–145)
TOTAL PROTEIN: 7.6 G/DL (ref 6.4–8.2)
WBC # BLD: 13.4 K/UL (ref 4–11)

## 2021-01-11 PROCEDURE — 6370000000 HC RX 637 (ALT 250 FOR IP): Performed by: INTERNAL MEDICINE

## 2021-01-11 PROCEDURE — 6360000002 HC RX W HCPCS: Performed by: INTERNAL MEDICINE

## 2021-01-11 PROCEDURE — 83735 ASSAY OF MAGNESIUM: CPT

## 2021-01-11 PROCEDURE — 80053 COMPREHEN METABOLIC PANEL: CPT

## 2021-01-11 PROCEDURE — 6370000000 HC RX 637 (ALT 250 FOR IP): Performed by: NURSE PRACTITIONER

## 2021-01-11 PROCEDURE — 2580000003 HC RX 258: Performed by: INTERNAL MEDICINE

## 2021-01-11 PROCEDURE — 1200000000 HC SEMI PRIVATE

## 2021-01-11 PROCEDURE — 85025 COMPLETE CBC W/AUTO DIFF WBC: CPT

## 2021-01-11 PROCEDURE — 6370000000 HC RX 637 (ALT 250 FOR IP)

## 2021-01-11 PROCEDURE — 6360000002 HC RX W HCPCS: Performed by: NURSE PRACTITIONER

## 2021-01-11 RX ADMIN — OXYCODONE HYDROCHLORIDE 10 MG: 5 TABLET ORAL at 22:32

## 2021-01-11 RX ADMIN — CEFEPIME 2 G: 2 INJECTION, POWDER, FOR SOLUTION INTRAMUSCULAR; INTRAVENOUS at 21:00

## 2021-01-11 RX ADMIN — HYDROMORPHONE HYDROCHLORIDE 1 MG: 2 INJECTION INTRAMUSCULAR; INTRAVENOUS; SUBCUTANEOUS at 04:21

## 2021-01-11 RX ADMIN — HYDROMORPHONE HYDROCHLORIDE 1 MG: 2 INJECTION INTRAMUSCULAR; INTRAVENOUS; SUBCUTANEOUS at 14:24

## 2021-01-11 RX ADMIN — HYDROMORPHONE HYDROCHLORIDE 1 MG: 2 INJECTION INTRAMUSCULAR; INTRAVENOUS; SUBCUTANEOUS at 20:58

## 2021-01-11 RX ADMIN — CEFEPIME 2 G: 2 INJECTION, POWDER, FOR SOLUTION INTRAMUSCULAR; INTRAVENOUS at 08:19

## 2021-01-11 RX ADMIN — HYDROXYUREA 1000 MG: 500 CAPSULE ORAL at 22:33

## 2021-01-11 RX ADMIN — HYDROMORPHONE HYDROCHLORIDE 1 MG: 2 INJECTION INTRAMUSCULAR; INTRAVENOUS; SUBCUTANEOUS at 11:22

## 2021-01-11 RX ADMIN — OXYCODONE HYDROCHLORIDE 20 MG: 20 TABLET, FILM COATED, EXTENDED RELEASE ORAL at 21:01

## 2021-01-11 RX ADMIN — OXYCODONE HYDROCHLORIDE 10 MG: 5 TABLET ORAL at 16:09

## 2021-01-11 RX ADMIN — Medication: at 23:49

## 2021-01-11 RX ADMIN — OXYCODONE HYDROCHLORIDE 20 MG: 20 TABLET, FILM COATED, EXTENDED RELEASE ORAL at 08:10

## 2021-01-11 RX ADMIN — FOLIC ACID 2 MG: 1 TABLET ORAL at 08:10

## 2021-01-11 RX ADMIN — HYDROMORPHONE HYDROCHLORIDE 1 MG: 2 INJECTION INTRAMUSCULAR; INTRAVENOUS; SUBCUTANEOUS at 17:24

## 2021-01-11 RX ADMIN — VANCOMYCIN HYDROCHLORIDE 1250 MG: 10 INJECTION, POWDER, LYOPHILIZED, FOR SOLUTION INTRAVENOUS at 18:37

## 2021-01-11 RX ADMIN — VANCOMYCIN HYDROCHLORIDE 1250 MG: 10 INJECTION, POWDER, LYOPHILIZED, FOR SOLUTION INTRAVENOUS at 02:37

## 2021-01-11 RX ADMIN — HYDROMORPHONE HYDROCHLORIDE 1 MG: 2 INJECTION INTRAMUSCULAR; INTRAVENOUS; SUBCUTANEOUS at 01:18

## 2021-01-11 RX ADMIN — HYDROXYUREA 1000 MG: 500 CAPSULE ORAL at 08:22

## 2021-01-11 RX ADMIN — HYDROMORPHONE HYDROCHLORIDE 1 MG: 2 INJECTION INTRAMUSCULAR; INTRAVENOUS; SUBCUTANEOUS at 08:17

## 2021-01-11 RX ADMIN — VANCOMYCIN HYDROCHLORIDE 1250 MG: 10 INJECTION, POWDER, LYOPHILIZED, FOR SOLUTION INTRAVENOUS at 11:24

## 2021-01-11 RX ADMIN — CYANOCOBALAMIN TAB 500 MCG 1000 MCG: 500 TAB at 08:10

## 2021-01-11 ASSESSMENT — PAIN SCALES - GENERAL
PAINLEVEL_OUTOF10: 8
PAINLEVEL_OUTOF10: 8
PAINLEVEL_OUTOF10: 6
PAINLEVEL_OUTOF10: 8
PAINLEVEL_OUTOF10: 7

## 2021-01-11 NOTE — PROGRESS NOTES
Pt a/o. Stated no nausea; no emesis. Still complains of pain. Medicated per [facility] with IV and PO pain medications per pt request. Call light within reach; will continue to monitor.

## 2021-01-11 NOTE — PLAN OF CARE
Problem: Safety:  Goal: Free from accidental physical injury  Description: Free from accidental physical injury  1/11/2021 1108 by Namita Meade RN  Outcome: Ongoing  Note: Pt remains safe. Uses call light appropriately to call out for assistance. Bed locked in lowes position; side rails 2/4; call light and bedside table within reach of pt.

## 2021-01-11 NOTE — PROGRESS NOTES
ONCOLOGY HEMATOLOGY CARE PROGRESS NOTE      SUBJECTIVE:     Feels better this AM. NO breathing issues. Pain well controlled. Hgb is 8. Repeat COVID pending. No sense of smell yet. ROS:   The remaining 10 point review of symptoms is unremarkable. Telemedicine call. OBJECTIVE        Physical    VITALS:  /63   Pulse 96   Temp 98.2 °F (36.8 °C) (Oral)   Resp 14   Ht 5' 8\" (1.727 m)   Wt 172 lb 8 oz (78.2 kg)   SpO2 98%   BMI 26.23 kg/m²   TEMPERATURE:  Current - Temp: 98.2 °F (36.8 °C); Max - Temp  Av.1 °F (36.7 °C)  Min: 97.5 °F (36.4 °C)  Max: 98.7 °F (37.1 °C)  PULSE OXIMETRY RANGE: SpO2  Av.2 %  Min: 93 %  Max: 100 %  24HR INTAKE/OUTPUT:      Intake/Output Summary (Last 24 hours) at 2021 1221  Last data filed at 2021 0238  Gross per 24 hour   Intake 2000.12 ml   Output 2650 ml   Net -649.88 ml       PE: deferred due to rule out covid status       Data      Recent Labs     21  0948 01/10/21  0636 21  0618   WBC 17.4* 17.6* 13.4*   HGB 7.8* 8.7* 8.0*   HCT 23.6* 27.4* 24.4*    see below 255   MCV 97.6 101.9* 99.1        Recent Labs     21  1755 01/10/21  0636 21  0618   * 134* 135*   K 5.0 4.6 4.2    103 103   CO2 21 20* 22   BUN 9 9 9   CREATININE 0.7* 0.6* 0.6*     Recent Labs     21  1755 01/10/21  0636 21  0618   AST 24 27 24   ALT 13 14 14   BILITOT 3.0* 3.1* 2.9*   ALKPHOS 86 84 82       Magnesium:    Lab Results   Component Value Date    MG 2.10 2021    MG 2.10 01/10/2021    MG 2.20 2021     R Shoulder XR 21:  No significant finding in the right shoulder. R Hip XR 21:  No acute abnormality of the right hip.     Problem List  Patient Active Problem List   Diagnosis    [facility] [address]    Asthma    Sickle cell crisis ([facility]    Sepsis ([facility] [address] [facility]    Opiate overdose ([facility] [address] [facility]    Opiate antagonist poisoning, accidental or unintentional, initial encounter  Leukocytosis    Hypoxia    Anemia    Other chest pain    Pneumonia due to infectious organism    Fever    Chronic prescription opiate use       ASSESSMENT AND PLAN:      Sickle cell crisis:  -No evidence of acute chest pain syndrome  -This is typical for his pain crises  -We will resume his OxyContin 20 mg p.o. every 12  -Continue as needed oxycodone 5-10 mg PRN   -We will supplement this with IV Dilaudid  -added Toradol 30 mg IV every 8 for 9 doses   -We will continue his Hydrea  - No plans for exchange transfusion at this time   - XR of hip and shoulder unremarkable       Pain management:  -This has been a problem with compliance  -His parents have been in to help with this  -He seems to be having more trouble lately and this could be because he is aging  -He has been started on crizanilizumab, but missed his last dose and hopefully this will improve his quality of life  -IV fluids will be administered  -s/p 2 units PRBC 1/7 with repeat Hgb 8- no transfusions today   -  Dilaudid to 1 mg PRN IVP      Fever:  -I agree with empiric cefepime and vancomycin- cont for now   -Rapid COVID-19 and influenza are negative  -Blood cultures are NGTD  -He has been having fevers again  - consider ID assessment- no fevers for 48 hours. Will hold off for now.  WBC improved as well from 17 to 13 this AM.   - will get COVID PCR with complaint that he cannot smell anything- pending     ONCOLOGIC DISPOSITION:     -Once his pain is controlled ; 1-3 days     Venida Holstein, CNP

## 2021-01-11 NOTE — PROGRESS NOTES
Hospitalist Progress Note      PCP: Mela Og MD    Date of Admission: 1/5/2021    Chief Complaint: Sickle cell pain crisis    Hospital Course: Reviewed H&P. Patient reports that he \"couldn't smell \" and a Rapid COIVD testing was obtained overnight and was negative . Patient continued to spike fevers with T-max 102. 6F overnight. Noted leukocytosis persistent. Patient denies any cough, shortness of breath and is currently saturating in high 90s on room air. Patient received a call from his sister who returned from [place] yesterday and tested positive. Patient reports he was not around her. Noted hematology requested for Covid PCR. Will put patient in droplet plus isolation until results come back. Still requiring IV pain meds per RN . Continue current  pain regimen per Hematology . Continue Empiric Vanco/Cefepime for now . Cultures negative thus far. S/P 2 u PRBC transfusion ON 1/7/21. Subjective: Pain improved. Still some pain in his R hip. Alphonso PO. No SOB or cough.      Medications:  Reviewed    Infusion Medications    sodium chloride      dextrose 5% and 0.45% NaCl with KCl 20 mEq 75 mL/hr at 01/08/21 1700    sodium chloride Stopped (01/07/21 0330)     Scheduled Medications    cefepime  2 g Intravenous Q12H    vancomycin  1,250 mg Intravenous Q8H    acetaminophen  650 mg Oral Once    diphenhydrAMINE  25 mg Oral Once    oxyCODONE  20 mg Oral 2 times per day    sodium chloride flush  10 mL Intravenous 2 times per day    folic acid  2 mg Oral Daily    hydroxyurea  1,000 mg Oral BID    cyanocobalamin  1,000 mcg Oral Daily    enoxaparin  40 mg Subcutaneous Daily PRN Meds: sodium chloride, HYDROmorphone, oxyCODONE **OR** oxyCODONE, sodium chloride, sodium chloride flush, sodium chloride flush, potassium chloride **OR** potassium alternative oral replacement **OR** potassium chloride, magnesium sulfate, promethazine **OR** ondansetron, acetaminophen **OR** acetaminophen, melatonin      Intake/Output Summary (Last 24 hours) at 1/11/2021 1048  Last data filed at 1/11/2021 0238  Gross per 24 hour   Intake 2000.12 ml   Output 2650 ml   Net -649.88 ml       Physical Exam Performed:  /68   Pulse 75   Temp 97.5 °F (36.4 °C) (Oral)   Resp 14   Ht 5' 8\" (1.727 m)   Wt 172 lb 8 oz (78.2 kg)   SpO2 100%   BMI 26.23 kg/m²     General appearance: No apparent distress, appears stated age and cooperative. HEENT: Pupils equal, round, and reactive to light. Conjunctivae/corneas clear. Neck: Supple, with full range of motion. No jugular venous distention. Trachea midline. Respiratory:  Normal respiratory effort. Clear to auscultation, bilaterally without Rales/Wheezes/Rhonchi. Cardiovascular: Regular rate and rhythm with normal S1/S2 without murmurs, rubs or gallops. Abdomen: Soft, non-tender, non-distended with normal bowel sounds. Musculoskeletal: tender over R hip  Skin: Skin color, texture, turgor normal.  No rashes or lesions. Neurologic:  Neurovascularly intact without any focal sensory/motor deficits.  Cranial nerves: II-XII intact, grossly non-focal.  Psychiatric: Alert and oriented, thought content appropriate, normal insight  Capillary Refill: Brisk,< 3 seconds   Peripheral Pulses: +2 palpable, equal bilaterally       Labs:   Recent Labs     01/09/21  0948 01/10/21  0636 01/11/21  0618   WBC 17.4* 17.6* 13.4*   HGB 7.8* 8.7* 8.0*   HCT 23.6* 27.4* 24.4*    see below 255     Recent Labs     01/09/21  1755 01/10/21  0636 01/11/21  0618   * 134* 135*   K 5.0 4.6 4.2    103 103   CO2 21 20* 22   BUN 9 9 9   CREATININE 0.7* 0.6* 0.6* CALCIUM 10.1 10.0 9.9     Recent Labs     01/09/21  1755 01/10/21  0636 01/11/21  0618   AST 24 27 24   ALT 13 14 14   BILITOT 3.0* 3.1* 2.9*   ALKPHOS 86 84 82     Urinalysis:    Lab Results   Component Value Date    NITRU Negative 01/05/2021    WBCUA None seen 01/05/2021    RBCUA 0-2 01/05/2021    BLOODU SMALL 01/05/2021    SPECGRAV 1.010 01/05/2021    GLUCOSEU Negative 01/05/2021       Radiology:  XR HIP RIGHT (1 VIEW)   Preliminary Result   No acute abnormality of the right hip. XR SHOULDER RIGHT (MIN 2 VIEWS)   Final Result   No significant finding in the right shoulder. CT CHEST PULMONARY EMBOLISM W CONTRAST   Final Result   1. Negative for acute pulmonary embolism   2. Mild ground-glass opacities seen peripherally in the lung bases. Although   this may represent changes from a previous infectious or inflammatory   process, atypical pneumonia could give this appearance         CT HEAD WO CONTRAST   Final Result   No acute intracranial abnormality. No change from prior study         XR CHEST PORTABLE   Final Result   Stable cardiomegaly           Active Hospital Problems    Diagnosis Date Noted    Fever [R50.9] 01/05/2021    Hypoxia [R09.02] 12/02/2020    Sickle cell crisis (HCC) [D57.00] 07/28/2020     Assessment/Plan:  Sickle cell pain crisis  -  Continue  pain control with combination oxycontine 20mg po BID, oxycodone 5-10mg prn, and IV dilaudid for breakthrough in addition to toradol 30mg IV every 8h for 9 doses (per HEMATOLOGY) . -  Continue home hydroxyurea, folic acid, and P34.  -  Supplemental O2 as needed. -  Received ~ 2.5L LR as a bolus in the ER. Continue with D50.9NS @ 75mL/hr for maintenance. - Transfuse prn Hgb < 7 g/dL. - Hematology consulted to assist with management- S/p 2 u PRBC transfusion on 1/7/21     Fever (POA)   -  No apparent infectious source. COVID-19 NAAT and Influenza A/B Ag negative. Procalcitonin = 0.66. -  Received empiric zosyn and vancomycin in the ER. Continued pending cultures -no growth to date.  -Leukocytosis resolved . Discuss with hematology if can DC abx    - COVID PCR sent on 1/10/21 by Hematology .  Continue droplet plus isolation till results back.      DVT Prophylaxis: Lovenox  Diet: DIET GENERAL;  Code Status: Full Code    PT/OT Eval Status: Ambulatory  at baseline    Dispo - Likely 1-2 days pending Pain control and cleared by Hematology       Dania Fregoso MD

## 2021-01-12 VITALS
HEART RATE: 96 BPM | DIASTOLIC BLOOD PRESSURE: 60 MMHG | WEIGHT: 174.3 LBS | HEIGHT: 68 IN | TEMPERATURE: 98.8 F | BODY MASS INDEX: 26.42 KG/M2 | SYSTOLIC BLOOD PRESSURE: 112 MMHG | OXYGEN SATURATION: 98 % | RESPIRATION RATE: 18 BRPM

## 2021-01-12 LAB
A/G RATIO: 1.1 (ref 1.1–2.2)
ALBUMIN SERPL-MCNC: 3.8 G/DL (ref 3.4–5)
ALP BLD-CCNC: 85 U/L (ref 40–129)
ALT SERPL-CCNC: 19 U/L (ref 10–40)
ANION GAP SERPL CALCULATED.3IONS-SCNC: 11 MMOL/L (ref 3–16)
AST SERPL-CCNC: 33 U/L (ref 15–37)
BILIRUB SERPL-MCNC: 2.7 MG/DL (ref 0–1)
BUN BLDV-MCNC: 10 MG/DL (ref 7–20)
CALCIUM SERPL-MCNC: 9.8 MG/DL (ref 8.3–10.6)
CHLORIDE BLD-SCNC: 103 MMOL/L (ref 99–110)
CO2: 22 MMOL/L (ref 21–32)
CREAT SERPL-MCNC: 0.6 MG/DL (ref 0.9–1.3)
GFR AFRICAN AMERICAN: >60
GFR NON-AFRICAN AMERICAN: >60
GLOBULIN: 3.6 G/DL
GLUCOSE BLD-MCNC: 106 MG/DL (ref 70–99)
MAGNESIUM: 1.9 MG/DL (ref 1.8–2.4)
POTASSIUM SERPL-SCNC: 4.3 MMOL/L (ref 3.5–5.1)
REASON FOR REJECTION: NORMAL
REJECTED TEST: NORMAL
SODIUM BLD-SCNC: 136 MMOL/L (ref 136–145)
TOTAL PROTEIN: 7.4 G/DL (ref 6.4–8.2)

## 2021-01-12 PROCEDURE — 6370000000 HC RX 637 (ALT 250 FOR IP): Performed by: INTERNAL MEDICINE

## 2021-01-12 PROCEDURE — 36415 COLL VENOUS BLD VENIPUNCTURE: CPT

## 2021-01-12 PROCEDURE — 6370000000 HC RX 637 (ALT 250 FOR IP): Performed by: NURSE PRACTITIONER

## 2021-01-12 PROCEDURE — 83735 ASSAY OF MAGNESIUM: CPT

## 2021-01-12 PROCEDURE — 80053 COMPREHEN METABOLIC PANEL: CPT

## 2021-01-12 PROCEDURE — 6360000002 HC RX W HCPCS: Performed by: INTERNAL MEDICINE

## 2021-01-12 PROCEDURE — 6360000002 HC RX W HCPCS: Performed by: NURSE PRACTITIONER

## 2021-01-12 PROCEDURE — 2580000003 HC RX 258: Performed by: INTERNAL MEDICINE

## 2021-01-12 RX ORDER — OXYCODONE HCL 20 MG/1
20 TABLET, FILM COATED, EXTENDED RELEASE ORAL EVERY 12 HOURS SCHEDULED
Qty: 60 EACH | Refills: 0 | OUTPATIENT
Start: 2021-01-12 | End: 2021-02-11

## 2021-01-12 RX ORDER — OXYCODONE HYDROCHLORIDE 10 MG/1
10 TABLET ORAL EVERY 6 HOURS PRN
Qty: 60 TABLET | Refills: 0 | OUTPATIENT
Start: 2021-01-12 | End: 2021-01-15

## 2021-01-12 RX ORDER — AMOXICILLIN AND CLAVULANATE POTASSIUM 875; 125 MG/1; MG/1
1 TABLET, FILM COATED ORAL 2 TIMES DAILY
Qty: 14 TABLET | Refills: 0 | OUTPATIENT
Start: 2021-01-12 | End: 2021-01-19

## 2021-01-12 RX ADMIN — HYDROMORPHONE HYDROCHLORIDE 1 MG: 2 INJECTION INTRAMUSCULAR; INTRAVENOUS; SUBCUTANEOUS at 11:03

## 2021-01-12 RX ADMIN — CEFEPIME 2 G: 2 INJECTION, POWDER, FOR SOLUTION INTRAMUSCULAR; INTRAVENOUS at 09:23

## 2021-01-12 RX ADMIN — CYANOCOBALAMIN TAB 500 MCG 1000 MCG: 500 TAB at 09:21

## 2021-01-12 RX ADMIN — OXYCODONE HYDROCHLORIDE 20 MG: 20 TABLET, FILM COATED, EXTENDED RELEASE ORAL at 09:22

## 2021-01-12 RX ADMIN — HYDROMORPHONE HYDROCHLORIDE 1 MG: 2 INJECTION INTRAMUSCULAR; INTRAVENOUS; SUBCUTANEOUS at 00:12

## 2021-01-12 RX ADMIN — HYDROXYUREA 1000 MG: 500 CAPSULE ORAL at 09:23

## 2021-01-12 RX ADMIN — HYDROMORPHONE HYDROCHLORIDE 1 MG: 2 INJECTION INTRAMUSCULAR; INTRAVENOUS; SUBCUTANEOUS at 03:15

## 2021-01-12 RX ADMIN — VANCOMYCIN HYDROCHLORIDE 1250 MG: 10 INJECTION, POWDER, LYOPHILIZED, FOR SOLUTION INTRAVENOUS at 05:17

## 2021-01-12 RX ADMIN — OXYCODONE HYDROCHLORIDE 10 MG: 5 TABLET ORAL at 06:42

## 2021-01-12 RX ADMIN — FOLIC ACID 2 MG: 1 TABLET ORAL at 09:21

## 2021-01-12 RX ADMIN — HYDROMORPHONE HYDROCHLORIDE 1 MG: 2 INJECTION INTRAMUSCULAR; INTRAVENOUS; SUBCUTANEOUS at 07:50

## 2021-01-12 RX ADMIN — VANCOMYCIN HYDROCHLORIDE 1250 MG: 10 INJECTION, POWDER, LYOPHILIZED, FOR SOLUTION INTRAVENOUS at 11:03

## 2021-01-12 ASSESSMENT — PAIN SCALES - GENERAL
PAINLEVEL_OUTOF10: 6
PAINLEVEL_OUTOF10: 6

## 2021-01-12 NOTE — CARE COORDINATION
New pt to A1, PCR negative from 1/10. Per Heme Onc, dc disposition once his pain is controlled. 1-3days. No discharge needs noted at this time, will continue to follow.   FERMIN Peters-SHAKEEL

## 2021-01-12 NOTE — PROGRESS NOTES
Hospitalist Progress Note      PCP: Celine Stauffer MD    Date of Admission: 1/5/2021    Chief Complaint: Sickle cell pain crisis    Hospital Course: Reviewed H&P. Patient reports that he \"couldn't smell \" and a Rapid COIVD testing was obtained overnight and was negative . Patient continued to spike fevers with T-max 102. 6F overnight. Noted leukocytosis persistent. Patient denies any cough, shortness of breath and is currently saturating in high 90s on room air. Patient received a call from his sister who returned from [place] yesterday and tested positive. Patient reports he was not around her. Noted hematology requested for Covid PCR. Will put patient in droplet plus isolation until results come back. Still requiring IV pain meds per RN . Continue current  pain regimen per Hematology . Continue Empiric Vanco/Cefepime for now . Cultures negative thus far. S/P 2 u PRBC transfusion ON 1/7/21. Subjective: Pain improved. Hopes to go home today.     Medications:  Reviewed    Infusion Medications    sodium chloride      dextrose 5% and 0.45% NaCl with KCl 20 mEq 75 mL/hr at 01/08/21 1700    sodium chloride Stopped (01/07/21 0330)     Scheduled Medications    cefepime  2 g Intravenous Q12H    vancomycin  1,250 mg Intravenous Q8H    acetaminophen  650 mg Oral Once    diphenhydrAMINE  25 mg Oral Once    oxyCODONE  20 mg Oral 2 times per day    sodium chloride flush  10 mL Intravenous 2 times per day    folic acid  2 mg Oral Daily    hydroxyurea  1,000 mg Oral BID    cyanocobalamin  1,000 mcg Oral Daily    enoxaparin  40 mg Subcutaneous Daily     PRN Meds: sodium chloride, HYDROmorphone, oxyCODONE **OR** oxyCODONE, sodium chloride, sodium chloride flush, sodium chloride flush, potassium chloride **OR** potassium alternative oral replacement **OR** potassium chloride, magnesium sulfate, promethazine **OR** ondansetron, acetaminophen **OR** acetaminophen, melatonin Intake/Output Summary (Last 24 hours) at 1/12/2021 1129  Last data filed at 1/12/2021 0440  Gross per 24 hour   Intake 100 ml   Output 1500 ml   Net -1400 ml       Physical Exam Performed:  /66   Pulse 114   Temp 98.1 °F (36.7 °C) (Oral)   Resp 17   Ht 5' 8\" (1.727 m)   Wt 174 lb 4.8 oz (79.1 kg)   SpO2 100%   BMI 26.50 kg/m²     General appearance: No apparent distress, appears stated age and cooperative. HEENT: Pupils equal, round, and reactive to light. Conjunctivae/corneas clear. Neck: Supple, with full range of motion. No jugular venous distention. Trachea midline. Respiratory:  Normal respiratory effort. Clear to auscultation, bilaterally without Rales/Wheezes/Rhonchi. Cardiovascular: Regular rate and rhythm with normal S1/S2 without murmurs, rubs or gallops. Abdomen: Soft, non-tender, non-distended with normal bowel sounds. Musculoskeletal: tender over R hip  Skin: Skin color, texture, turgor normal.  No rashes or lesions. Neurologic:  Neurovascularly intact without any focal sensory/motor deficits.  Cranial nerves: II-XII intact, grossly non-focal.  Psychiatric: Alert and oriented, thought content appropriate, normal insight  Capillary Refill: Brisk,< 3 seconds   Peripheral Pulses: +2 palpable, equal bilaterally       Labs:   Recent Labs     01/10/21  0636 01/11/21 0618   WBC 17.6* 13.4*   HGB 8.7* 8.0*   HCT 27.4* 24.4*   PLT see below 255     Recent Labs     01/10/21  0636 01/11/21 0618 01/12/21  0543   * 135* 136   K 4.6 4.2 4.3    103 103   CO2 20* 22 22   BUN 9 9 10   CREATININE 0.6* 0.6* 0.6*   CALCIUM 10.0 9.9 9.8     Recent Labs     01/10/21  0636 01/11/21  0618 01/12/21  0543   AST 27 24 33   ALT 14 14 19   BILITOT 3.1* 2.9* 2.7*   ALKPHOS 84 82 85     Urinalysis:    Lab Results   Component Value Date    NITRU Negative 01/05/2021    WBCUA None seen 01/05/2021    RBCUA 0-2 01/05/2021    BLOODU SMALL 01/05/2021    SPECGRAV 1.010 01/05/2021 GLUCOSEU Negative 01/05/2021       Radiology:  XR HIP RIGHT (1 VIEW)   Final Result   No acute abnormality of the right hip. XR SHOULDER RIGHT (MIN 2 VIEWS)   Final Result   No significant finding in the right shoulder. CT CHEST PULMONARY EMBOLISM W CONTRAST   Final Result   1. Negative for acute pulmonary embolism   2. Mild ground-glass opacities seen peripherally in the lung bases. Although   this may represent changes from a previous infectious or inflammatory   process, atypical pneumonia could give this appearance         CT HEAD WO CONTRAST   Final Result   No acute intracranial abnormality. No change from prior study         XR CHEST PORTABLE   Final Result   Stable cardiomegaly           Active Hospital Problems    Diagnosis Date Noted    Fever [R50.9] 01/05/2021    Hypoxia [R09.02] 12/02/2020    Sickle cell crisis (HCC) [D57.00] 07/28/2020     Assessment/Plan:  Sickle cell pain crisis  -  Treated for  pain control with combination oxycontine 20mg po BID, oxycodone 5-10mg prn, and IV dilaudid for breakthrough in addition to toradol 30mg IV every 8h for 9 doses (per HEMATOLOGY) . -  Continued home hydroxyurea, folic acid, and H16.  -  Supplemental O2 as needed. -  Received ~ 2.5L LR as a bolus in the ER. Continue with D50.9NS @ 75mL/hr for maintenance. - Transfuse prn Hgb < 7 g/dL. - Hematology consulted to assist with management- S/p 2 u PRBC transfusion on 1/7/21  -DC home on pain medication prescribed by Heme.      Fever (POA)   -  No apparent infectious source. COVID-19 NAAT and Influenza A/B Ag negative. Procalcitonin = 0.66.  -  Received empiric zosyn and vancomycin in the ER. Continued pending cultures -no growth to date.  -Leukocytosis resolved . DC abx    - COVID PCR sent on 1/10/21 by Hematology and negative.       DVT Prophylaxis: Lovenox  Diet: DIET GENERAL;  Code Status: Full Code    PT/OT Eval Status: Ambulatory  at baseline    Dispo - [address] home today. Parish Salazar MD

## 2021-01-12 NOTE — PROGRESS NOTES
Discharge instructions and medications reviewed with patient at bedside. IV and Tele discontinued, patient has no questions at this time. New medication dosage decrease and prescription instructions explained in full. All belongings accounted for. Discharge packet copy sheet signed and placed in chart. Pt wheeled out front lobby by PCA. [address] notified.  Electronically signed by Lei Marley RN on 1/12/2021 at 1:18 PM

## 2021-01-12 NOTE — DISCHARGE INSTR - DIET

## 2021-01-12 NOTE — PROGRESS NOTES
Pt is alert and oriented x 4. Vitals signs are stable. Assessment is as charted. Pt denies further needs at this time.

## 2021-01-12 NOTE — PLAN OF CARE
Problem: Pain:  Goal: Pain level will decrease  Description: Pain level will decrease  Outcome: Ongoing    Pt is rating pain 7/10. Pt medicated with PRN Dilaudid as ordered. Will continue to monitor.

## 2021-01-12 NOTE — DISCHARGE SUMMARY
Discharge Medications:       [address] Medication Instructions DJO:[id number]    Printed on:01/12/21 9832   Medication Information                      folic acid (FOLVITE) 1 MG tablet  Take 2 tablets by mouth daily             hydroxyurea (HYDREA) 500 MG chemo capsule  Take 2 capsules by mouth 2 times daily             vitamin B-12 1000 MCG tablet  Take 1 tablet by mouth daily                 Consults:  IM     Significant Diagnostic Studies/Imaging:     Xr Shoulder Right (min 2 Views)    Result Date: 1/9/2021  EXAMINATION: THREE XRAY VIEWS OF THE RIGHT SHOULDER 1/9/2021 10:22 am COMPARISON: None. HISTORY: ORDERING SYSTEM PROVIDED HISTORY: shoulder pain, sickle cell TECHNOLOGIST PROVIDED HISTORY: Reason for exam:->shoulder pain, sickle cell Reason for Exam: shoulder pain, sickle cell Acuity: Acute Type of Exam: Initial FINDINGS: Skeletal structures are intact. No fracture or dislocation. No significant soft tissue abnormalities. No significant finding in the right shoulder. Xr Hip Right (1 View)    Result Date: 1/11/2021  EXAMINATION: ONE X-RAY VIEW OF THE RIGHT HIP 1/9/2021 10:22 am COMPARISON: None HISTORY: ORDERING SYSTEM PROVIDED HISTORY: Hip pain, sickle cell TECHNOLOGIST PROVIDED HISTORY: Reason for exam:   Hip pain, sickle cell Reason for Exam:  Hip pain, sickle cell Acuity: Acute Type of Exam: Initial FINDINGS: A single frontal view of the right hip was performed. There is no radiographic evidence of an acute fracture or dislocation. There is no evidence of abnormal sclerosis or avascular necrosis. The right hip joint space is preserved, without joint space loss or degenerative change. The visualized right hemipelvis is intact. No soft tissue abnormality is evident. No acute abnormality of the right hip.      Ct Head Wo Contrast    Result Date: 1/5/2021 EXAMINATION: CTA OF THE CHEST 1/5/2021 4:32 pm TECHNIQUE: CTA of the chest was performed after the administration of intravenous contrast.  2 injections into scans were performed as the 1st injection was suboptimal.  Multiplanar reformatted images are provided for review. MIP images are provided for review. Dose modulation, iterative reconstruction, and/or weight based adjustment of the mA/kV was utilized to reduce the radiation dose to as low as reasonably achievable. COMPARISON: None. HISTORY: ORDERING SYSTEM PROVIDED HISTORY: hypoxia, fever TECHNOLOGIST PROVIDED HISTORY: Reason for exam:->hypoxia, fever Reason for Exam: Pt is having chest pain, hypoxic and has a fever Acuity: Acute Type of Exam: Initial Relevant Medical/Surgical History: hx of sickle cell FINDINGS: Pulmonary Arteries: Pulmonary arteries are adequately opacified for evaluation. No evidence of intraluminal filling defect to suggest pulmonary embolism. Main pulmonary artery is normal in caliber. Mediastinum: No evidence of mediastinal lymphadenopathy. The heart and pericardium demonstrate no acute abnormality. There is no acute abnormality of the thoracic aorta. Lungs/pleura: Ground-glass opacities seen peripherally in the lung bases. No evidence of pleural effusion or pneumothorax. Upper Abdomen: Limited images of the upper abdomen are unremarkable. Soft Tissues/Bones: No acute bone or soft tissue abnormality. 1. Negative for acute pulmonary embolism 2. Mild ground-glass opacities seen peripherally in the lung bases. Although this may represent changes from a previous infectious or inflammatory process, atypical pneumonia could give this appearance       Treatments:     See above     Disposition:     [facility] should be follow up with Jono Barcenas MD in 1 week.        Signed:     1/12/2021  11:37 AM    Jovanny Rodriguez CNP   Medical Oncology/Hematology    [address], [address]  Phone: [phone]  Fax: [phone]

## 2021-01-12 NOTE — PROGRESS NOTES
Comprehensive Nutrition Assessment    Type and Reason for Visit:  Initial, RD Nutrition Re-Screen/LOS    Nutrition Recommendations/Plan:   1. Continue general diet   2. Consider addition of bowel regimen, no BM this admission - MD to advise   3. Encourage PO intakes   4. Monitor nutrition adequacy, pertinent labs, bowel habits, wt changes, and clinical progress    Nutrition Assessment:  Pt admitted with sickle cell crisis and pain. Pt nutritionally compromised AEB no sense of smell and fluctuating PO intakes. Noted weight loss per EMR, pt denies any recent weight loss. Pt reports good appetite this date, ate % of breakfast this AM. C/o no sense of smell, but able to taste. Declined ONS. Encouraged continued good PO intakes. No nutrition questions or concerns at this time. Malnutrition Assessment:  Malnutrition Status: At risk for malnutrition (Comment)      Estimated Daily Nutrient Needs:  Energy (kcal):  9157-7743 kcal; Weight Used for Energy Requirements:  Ideal(70 kg)     Protein (g):  70-84 g; Weight Used for Protein Requirements:  Ideal(1.0-1.2 g/kg)        Fluid (ml/day):   ; Method Used for Fluid Requirements:  1 ml/kcal      Nutrition Related Findings:  Active BS. No BM noted. Wounds:  None       Current Nutrition Therapies:    DIET GENERAL; Anthropometric Measures:  · Height: 5' 8\" (172.7 cm)  · Current Body Weight: 174 lb (78.9 kg)   · Admission Body Weight: 177 lb (80.3 kg)    · Usual Body Weight: 188 lb (85.3 kg)(standing scale)     · Ideal Body Weight: 154 lbs; % Ideal Body Weight 113 %   · BMI: 26.5  · BMI Categories: Overweight (BMI 25.0-29. 9)       Nutrition Diagnosis:   · Predicted inadequate energy intake related to inadequate protein-energy intake, pain as evidenced by weight loss, intake 0-25%, intake 26-50%      Nutrition Interventions:   Food and/or Nutrient Delivery:  Continue Current Diet  Nutrition Education/Counseling:  No recommendation at this time Coordination of Nutrition Care:  Continue to monitor while inpatient    Goals:  Pt will consume greater than 50% of meals this admission       Nutrition Monitoring and Evaluation:   Food/Nutrient Intake Outcomes:  Food and Nutrient Intake  Physical Signs/Symptoms Outcomes:  Weight, Constipation     Discharge Planning:    Continue current diet     Electronically signed by Sunitha Gaona MS, RD, LD on 1/12/21 at 10:48 AM EST    Contact: [phone]

## 2021-01-12 NOTE — PROGRESS NOTES
Pt transferred from C3 to room 117. Report given to SHAKEEL Zhang. Pt's belongings sent with him. Chart and medications also sent with pt. [facility] notified of transfer prior to transfer.

## 2021-02-08 ENCOUNTER — HOSPITAL ENCOUNTER (EMERGENCY)
Age: 21
Discharge: HOME | End: 2021-02-08
Attending: EMERGENCY MEDICINE
Payer: COMMERCIAL

## 2021-02-08 VITALS
RESPIRATION RATE: 16 BRPM | HEART RATE: 71 BPM | TEMPERATURE: 98 F | OXYGEN SATURATION: 96 % | HEIGHT: 68 IN | SYSTOLIC BLOOD PRESSURE: 97 MMHG | BODY MASS INDEX: 28.04 KG/M2 | WEIGHT: 185 LBS | DIASTOLIC BLOOD PRESSURE: 51 MMHG

## 2021-02-08 DIAGNOSIS — D57.00 SICKLE CELL PAIN CRISIS (HCC): Primary | ICD-10-CM

## 2021-02-08 LAB
A/G RATIO: 1.8 (ref 1.1–2.2)
ALBUMIN SERPL-MCNC: 4.8 G/DL (ref 3.4–5)
ALP BLD-CCNC: 98 U/L (ref 40–129)
ALT SERPL-CCNC: 20 U/L (ref 10–40)
ANION GAP SERPL CALCULATED.3IONS-SCNC: 8 MMOL/L (ref 3–16)
AST SERPL-CCNC: 27 U/L (ref 15–37)
BILIRUB SERPL-MCNC: 3.4 MG/DL (ref 0–1)
BUN BLDV-MCNC: 12 MG/DL (ref 7–20)
CALCIUM SERPL-MCNC: 9.2 MG/DL (ref 8.3–10.6)
CHLORIDE BLD-SCNC: 106 MMOL/L (ref 99–110)
CO2: 26 MMOL/L (ref 21–32)
CREAT SERPL-MCNC: 1 MG/DL (ref 0.9–1.3)
GFR AFRICAN AMERICAN: >60
GFR NON-AFRICAN AMERICAN: >60
GLOBULIN: 2.7 G/DL
GLUCOSE BLD-MCNC: 90 MG/DL (ref 70–99)
IMMATURE RETIC FRACT: 0.68 (ref 0.21–0.37)
POTASSIUM REFLEX MAGNESIUM: 4.2 MMOL/L (ref 3.5–5.1)
RETICULOCYTE ABSOLUTE COUNT: 0.18 M/UL
RETICULOCYTE COUNT PCT: 8.39 % (ref 0.5–2.18)
SODIUM BLD-SCNC: 140 MMOL/L (ref 136–145)
TOTAL PROTEIN: 7.5 G/DL (ref 6.4–8.2)

## 2021-02-08 PROCEDURE — 6360000002 HC RX W HCPCS: Performed by: EMERGENCY MEDICINE

## 2021-02-08 PROCEDURE — 6370000000 HC RX 637 (ALT 250 FOR IP): Performed by: EMERGENCY MEDICINE

## 2021-02-08 PROCEDURE — 80053 COMPREHEN METABOLIC PANEL: CPT

## 2021-02-08 PROCEDURE — 96376 TX/PRO/DX INJ SAME DRUG ADON: CPT

## 2021-02-08 PROCEDURE — 2580000003 HC RX 258: Performed by: EMERGENCY MEDICINE

## 2021-02-08 PROCEDURE — 85025 COMPLETE CBC W/AUTO DIFF WBC: CPT

## 2021-02-08 PROCEDURE — 99284 EMERGENCY DEPT VISIT MOD MDM: CPT

## 2021-02-08 PROCEDURE — 85045 AUTOMATED RETICULOCYTE COUNT: CPT

## 2021-02-08 PROCEDURE — 96374 THER/PROPH/DIAG INJ IV PUSH: CPT

## 2021-02-08 RX ORDER — OXYCODONE HCL 20 MG/1
20 TABLET, FILM COATED, EXTENDED RELEASE ORAL ONCE
Status: COMPLETED | OUTPATIENT
Start: 2021-02-08 | End: 2021-02-08

## 2021-02-08 RX ORDER — 0.9 % SODIUM CHLORIDE 0.9 %
1000 INTRAVENOUS SOLUTION INTRAVENOUS ONCE
Status: COMPLETED | OUTPATIENT
Start: 2021-02-08 | End: 2021-02-08

## 2021-02-08 RX ADMIN — HYDROMORPHONE HYDROCHLORIDE 1 MG: 1 INJECTION, SOLUTION INTRAMUSCULAR; INTRAVENOUS; SUBCUTANEOUS at 16:21

## 2021-02-08 RX ADMIN — OXYCODONE HYDROCHLORIDE 20 MG: 20 TABLET, FILM COATED, EXTENDED RELEASE ORAL at 18:49

## 2021-02-08 RX ADMIN — HYDROMORPHONE HYDROCHLORIDE 1 MG: 1 INJECTION, SOLUTION INTRAMUSCULAR; INTRAVENOUS; SUBCUTANEOUS at 15:30

## 2021-02-08 RX ADMIN — SODIUM CHLORIDE 1000 ML: 9 INJECTION, SOLUTION INTRAVENOUS at 14:54

## 2021-02-08 ASSESSMENT — PAIN SCALES - GENERAL
PAINLEVEL_OUTOF10: 5
PAINLEVEL_OUTOF10: 9

## 2021-02-08 ASSESSMENT — PAIN DESCRIPTION - ORIENTATION: ORIENTATION: MID

## 2021-02-08 ASSESSMENT — PAIN DESCRIPTION - LOCATION: LOCATION: BACK

## 2021-02-08 NOTE — ED NOTES
Discharge instructions and follow up care reviewed with patient. Patient voiced understanding. Patient ambulated to private vehicle without difficulty.      Roxanna Mojica RN  02/08/21 8919

## 2021-02-08 NOTE — ED PROVIDER NOTES
[facility]      Pt Name: Alycia Gan  MRN: [id number]  [place] 2000  Date of evaluation: 2/8/2021  Provider: David Almendarez MD    CHIEF COMPLAINT       Chief Complaint   Patient presents with    Sickle Cell Pain Crisis     Patient last admitted here for 2 weeks. Today patient experiencing pain in bilat legs and shoulders to the point of not being able to stand and lowered himself to the floor. Patient also complaining of mid back pain. HISTORY OF PRESENT ILLNESS   (Location/Symptom, Timing/Onset, Context/Setting, Quality, Duration, Modifying Factors, Severity)  Note limiting factors. Alycia Gan is a 23 y.o. male with past medical history of sickle cell anemia here today for pain crisis. Patient states for last 1 to 2 days he has had increased sickle cell pain which he describes as a throbbing aching discomfort in his bilateral shoulders, hips, low back and legs. Consistent with his typical pain. No chest pain, shortness of breath or fevers. He typically takes multiple short and long-acting narcotics at home but ran out and cannot get his refill until tomorrow. [facility]    Nursing Notes were reviewed. REVIEW OF SYSTEMS    (2-9 systems for level 4, 10 or more for level 5)     Review of Systems    Please see HPI for pertinent positive and negative review of system findings. A full 10 system ROS was performed and otherwise negative.         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 , Disp-60 each, R-0Phone In      hydroxyurea (HYDREA) 500 MG chemo capsule Take 2 capsules by mouth 2 times dailyHistorical Med             ALLERGIES     Morphine    FAMILY HISTORY     History reviewed. No pertinent family history.        SOCIAL HISTORY       Social History     Socioeconomic History    Marital status: Single     Spouse name: None    Number of children: 0    Years of education: None    Highest education level: None   Occupational History    None   Social Needs    Financial resource strain: None    Food insecurity     Worry: None     Inability: None    Transportation needs     Medical: None     Non-medical: None   Tobacco Use    Smoking status: Never Smoker    Smokeless tobacco: Never Used   Substance and Sexual Activity    Alcohol use: Not Currently    Drug use: Never    Sexual activity: Not Currently   Lifestyle    Physical activity     Days per week: None     Minutes per session: None    Stress: None   Relationships    Social connections     Talks on phone: None     Gets together: None     Attends Synagogue service: None     Active member of club or organization: None     Attends meetings of clubs or organizations: None     Relationship status: None    Intimate partner violence     Fear of current or ex partner: None     Emotionally abused: None     Physically abused: None     Forced sexual activity: None   Other Topics Concern    None   Social History Narrative    None       SCREENINGS    Blade Coma Scale  Eye Opening: Spontaneous  Best Verbal Response: Oriented  Best Motor Response: Obeys commands  [place] Coma Scale Score: 15          PHYSICAL EXAM    (up to 7 for level 4, 8 or more for level 5)     ED Triage Vitals   BP Temp Temp Source Pulse Resp SpO2 Height Weight   02/08/21 1428 02/08/21 1432 02/08/21 1432 02/08/21 1428 02/08/21 1428 02/08/21 1428 02/08/21 1428 02/08/21 1428   (!) 113/58 98 °F (36.7 °C) Oral 92 24 97 % 5' 8\" (1.727 m) 185 lb (83.9 kg)       Physical Exam    General appearance:  Cooperative. Uncomfortable appearing and lying in bed  Skin:  Warm. Dry. Eye:  Extraocular movements intact. Ears, nose, mouth and throat:  Oral mucosa moist,  Neck:  Trachea midline. Heart:  Regular rate and rhythm  Perfusion:  intact  Respiratory:  Lungs clear to auscultation bilaterally. Respirations nonlabored. Abdominal:   Non distended. Nontender  Neurological:  Alert and oriented x 3. Moves all extremities spontaneously  Musculoskeletal:   Normal ROM, no deformities          Psychiatric:  Normal mood      DIAGNOSTIC RESULTS       Labs Reviewed   CBC WITH AUTO DIFFERENTIAL - Abnormal; Notable for the following components:       Result Value    RBC 2.20 (*)     Hemoglobin 8.5 (*)     Hematocrit 23.9 (*)     .6 (*)     MCH 38.6 (*)     RDW 24.4 (*)     Monocytes Absolute 1.5 (*)     Metamyelocytes Relative 1 (*)     nRBC 2 (*)     Anisocytosis 1+ (*)     Macrocytes 1+ (*)     Poikilocytes 2+ (*)     Ovalocytes 1+ (*)     Target Cells 1+ (*)     Sickle Cells 1+ (*)     All other components within normal limits    Narrative:     Performed at:  [address], [facility] [address]   Phone [phone]   COMPREHENSIVE METABOLIC PANEL W/ REFLEX TO MG FOR LOW K - Abnormal; Notable for the following components:     Total Bilirubin 3.4 (*)     All other components within normal limits    Narrative:     Performed at:  [address] [facility],  [address] [place] [address], [facility] [address]   Phone [phone]   RETICULOCYTES - Abnormal; Notable for the following components:    Retic Ct Pct 8.39 (*)     Immature Retic Fract 0.68 (*)     All other components within normal limits    Narrative:     Performed at:  [address] [facility],  [address] [place] [address], [facility] [address]   Phone [phone]       Interpretation per the Radiologist below, if obtained/available at the time of this note:    No orders to display       All other labs/imaging were within normal range or not returned as of this dictation. EMERGENCY DEPARTMENT COURSE and DIFFERENTIAL DIAGNOSIS/MDM:   Vitals:    Vitals:    02/08/21 1558 02/08/21 1658 02/08/21 1728 02/08/21 1830   BP: 112/67 114/60 (!) 109/47 (!) 97/51   Pulse: 81 76 80 71   Resp: 16 17 17 16   Temp:    98 °F (36.7 °C)   TempSrc:    Oral   SpO2: 95% 94% 98% 96%   Weight:       Height:           Patient presents emergency department today for sickle cell pain crisis. Cannot control his symptoms at home as he is out of his pain medications cannot get a refill until tomorrow. Was given 2 Dilaudid doses here. Labs show consistent anemia but no evidence of aplastic crisis. No concern for underlying infection. No concern for acute chest.  Appropriate rise in reticulocyte count. After his 2 doses of Dilaudid he was feeling much improved. He was given his oral OxyContin for long-acting relief and will be discharged home to obtain his prescriptions tomorrow. He did not require admission today    MDM    CONSULTS     None    Critical Care:   None    REASSESSMENT          PROCEDURE     Unless otherwise noted below, none     Procedures      FINAL IMPRESSION      1. Sickle cell pain crisis [facility])            DISPOSITION/PLAN   DISPOSITION Decision To Discharge 02/08/2021 06:03:14 PM        PATIENT REFERRED TO:  Annie Gutierrez MD  [address] [place] [phone]    Schedule an appointment as soon as possible for a visit         DISCHARGE MEDICATIONS:  Discharge Medication List as of 2/8/2021  6:03 PM        Controlled Substances Monitoring:     No flowsheet data found.     (Please note that portions of this note were completed with a voice recognition program.  Efforts were made to edit the dictations but occasionally words are mis-transcribed.)    Ramos Fitzgerald MD (electronically signed)  Attending Emergency Physician           Portillo Celis MD  02/08/21 6824

## 2021-02-09 LAB
ANISOCYTOSIS: ABNORMAL
BASOPHILS ABSOLUTE: 0 K/UL (ref 0–0.2)
BASOPHILS RELATIVE PERCENT: 0 %
EOSINOPHILS ABSOLUTE: 0.3 K/UL (ref 0–0.6)
EOSINOPHILS RELATIVE PERCENT: 3 %
HCT VFR BLD CALC: 23.9 % (ref 40.5–52.5)
HEMATOLOGY PATH CONSULT: NO
HEMOGLOBIN: 8.5 G/DL (ref 13.5–17.5)
LYMPHOCYTES ABSOLUTE: 3.3 K/UL (ref 1–5.1)
LYMPHOCYTES RELATIVE PERCENT: 31 %
MACROCYTES: ABNORMAL
MCH RBC QN AUTO: 38.6 PG (ref 26–34)
MCHC RBC AUTO-ENTMCNC: 35.6 G/DL (ref 31–36)
MCV RBC AUTO: 108.6 FL (ref 80–100)
METAMYELOCYTES RELATIVE PERCENT: 1 %
MONOCYTES ABSOLUTE: 1.5 K/UL (ref 0–1.3)
MONOCYTES RELATIVE PERCENT: 14 %
NEUTROPHILS ABSOLUTE: 5.6 K/UL (ref 1.7–7.7)
NEUTROPHILS RELATIVE PERCENT: 51 %
NUCLEATED RED BLOOD CELLS: 2 /100 WBC
OVALOCYTES: ABNORMAL
PDW BLD-RTO: 24.4 % (ref 12.4–15.4)
PLATELET # BLD: 203 K/UL (ref 135–450)
PLATELET SLIDE REVIEW: ADEQUATE
PMV BLD AUTO: 8.5 FL (ref 5–10.5)
POIKILOCYTES: ABNORMAL
RBC # BLD: 2.2 M/UL (ref 4.2–5.9)
SICKLE CELLS: ABNORMAL
SLIDE REVIEW: ABNORMAL
TARGET CELLS: ABNORMAL
WBC # BLD: 10.7 K/UL (ref 4–11)

## 2021-03-13 ENCOUNTER — HOSPITAL ENCOUNTER (EMERGENCY)
Age: 21
Discharge: HOME | End: 2021-03-13
Attending: EMERGENCY MEDICINE
Payer: COMMERCIAL

## 2021-03-13 VITALS
HEIGHT: 68 IN | SYSTOLIC BLOOD PRESSURE: 117 MMHG | OXYGEN SATURATION: 95 % | TEMPERATURE: 98.8 F | DIASTOLIC BLOOD PRESSURE: 68 MMHG | WEIGHT: 190 LBS | BODY MASS INDEX: 28.79 KG/M2 | HEART RATE: 75 BPM | RESPIRATION RATE: 14 BRPM

## 2021-03-13 DIAGNOSIS — M79.604 RIGHT LEG PAIN: ICD-10-CM

## 2021-03-13 DIAGNOSIS — D57.00 SICKLE CELL PAIN CRISIS (HCC): Primary | ICD-10-CM

## 2021-03-13 LAB
A/G RATIO: 1.5 (ref 1.1–2.2)
ALBUMIN SERPL-MCNC: 4.9 G/DL (ref 3.4–5)
ALP BLD-CCNC: 98 U/L (ref 40–129)
ALT SERPL-CCNC: 29 U/L (ref 10–40)
ANION GAP SERPL CALCULATED.3IONS-SCNC: 9 MMOL/L (ref 3–16)
ANISOCYTOSIS: ABNORMAL
AST SERPL-CCNC: 42 U/L (ref 15–37)
BASOPHILS ABSOLUTE: 0 K/UL (ref 0–0.2)
BASOPHILS RELATIVE PERCENT: 0 %
BILIRUB SERPL-MCNC: 3.9 MG/DL (ref 0–1)
BUN BLDV-MCNC: 12 MG/DL (ref 7–20)
CALCIUM SERPL-MCNC: 9.9 MG/DL (ref 8.3–10.6)
CHLORIDE BLD-SCNC: 106 MMOL/L (ref 99–110)
CO2: 25 MMOL/L (ref 21–32)
CREAT SERPL-MCNC: 0.7 MG/DL (ref 0.9–1.3)
EOSINOPHILS ABSOLUTE: 0.4 K/UL (ref 0–0.6)
EOSINOPHILS RELATIVE PERCENT: 3 %
GFR AFRICAN AMERICAN: >60
GFR NON-AFRICAN AMERICAN: >60
GLOBULIN: 3.3 G/DL
GLUCOSE BLD-MCNC: 98 MG/DL (ref 70–99)
HCT VFR BLD CALC: 28.1 % (ref 40.5–52.5)
HEMATOLOGY PATH CONSULT: NO
HEMOGLOBIN: 9.9 G/DL (ref 13.5–17.5)
IMMATURE RETIC FRACT: 0.62 (ref 0.21–0.37)
LYMPHOCYTES ABSOLUTE: 1.4 K/UL (ref 1–5.1)
LYMPHOCYTES RELATIVE PERCENT: 10 %
MACROCYTES: ABNORMAL
MCH RBC QN AUTO: 36.9 PG (ref 26–34)
MCHC RBC AUTO-ENTMCNC: 35.3 G/DL (ref 31–36)
MCV RBC AUTO: 104.5 FL (ref 80–100)
MONOCYTES ABSOLUTE: 1 K/UL (ref 0–1.3)
MONOCYTES RELATIVE PERCENT: 7 %
NEUTROPHILS ABSOLUTE: 10.9 K/UL (ref 1.7–7.7)
NEUTROPHILS RELATIVE PERCENT: 80 %
NUCLEATED RED BLOOD CELLS: 2 /100 WBC
OVALOCYTES: ABNORMAL
PDW BLD-RTO: 21.7 % (ref 12.4–15.4)
PLATELET # BLD: 317 K/UL (ref 135–450)
PLATELET SLIDE REVIEW: ADEQUATE
PMV BLD AUTO: 8.8 FL (ref 5–10.5)
POIKILOCYTES: ABNORMAL
POLYCHROMASIA: ABNORMAL
POTASSIUM REFLEX MAGNESIUM: 4.7 MMOL/L (ref 3.5–5.1)
RBC # BLD: 2.68 M/UL (ref 4.2–5.9)
RETICULOCYTE ABSOLUTE COUNT: 0.31 M/UL
RETICULOCYTE COUNT PCT: 11.67 % (ref 0.5–2.18)
SICKLE CELLS: ABNORMAL
SLIDE REVIEW: ABNORMAL
SODIUM BLD-SCNC: 140 MMOL/L (ref 136–145)
TARGET CELLS: ABNORMAL
TOTAL PROTEIN: 8.2 G/DL (ref 6.4–8.2)
WBC # BLD: 13.6 K/UL (ref 4–11)

## 2021-03-13 PROCEDURE — 2580000003 HC RX 258: Performed by: PHYSICIAN ASSISTANT

## 2021-03-13 PROCEDURE — 96376 TX/PRO/DX INJ SAME DRUG ADON: CPT

## 2021-03-13 PROCEDURE — 96374 THER/PROPH/DIAG INJ IV PUSH: CPT

## 2021-03-13 PROCEDURE — 85025 COMPLETE CBC W/AUTO DIFF WBC: CPT

## 2021-03-13 PROCEDURE — 80053 COMPREHEN METABOLIC PANEL: CPT

## 2021-03-13 PROCEDURE — 85045 AUTOMATED RETICULOCYTE COUNT: CPT

## 2021-03-13 PROCEDURE — 99284 EMERGENCY DEPT VISIT MOD MDM: CPT

## 2021-03-13 PROCEDURE — 6360000002 HC RX W HCPCS: Performed by: PHYSICIAN ASSISTANT

## 2021-03-13 PROCEDURE — 6370000000 HC RX 637 (ALT 250 FOR IP): Performed by: PHYSICIAN ASSISTANT

## 2021-03-13 RX ORDER — OXYCODONE HYDROCHLORIDE 5 MG/1
10 CAPSULE ORAL EVERY 4 HOURS PRN
Status: ON HOLD | COMMUNITY
End: 2021-06-21

## 2021-03-13 RX ORDER — OXYCODONE HYDROCHLORIDE 5 MG/1
10 TABLET ORAL ONCE
Status: COMPLETED | OUTPATIENT
Start: 2021-03-13 | End: 2021-03-13

## 2021-03-13 RX ORDER — 0.9 % SODIUM CHLORIDE 0.9 %
1000 INTRAVENOUS SOLUTION INTRAVENOUS ONCE
Status: COMPLETED | OUTPATIENT
Start: 2021-03-13 | End: 2021-03-13

## 2021-03-13 RX ADMIN — SODIUM CHLORIDE 1000 ML: 9 INJECTION, SOLUTION INTRAVENOUS at 15:14

## 2021-03-13 RX ADMIN — OXYCODONE 10 MG: 5 TABLET ORAL at 15:07

## 2021-03-13 RX ADMIN — HYDROMORPHONE HYDROCHLORIDE 1 MG: 1 INJECTION, SOLUTION INTRAMUSCULAR; INTRAVENOUS; SUBCUTANEOUS at 13:41

## 2021-03-13 RX ADMIN — OXYCODONE 10 MG: 5 TABLET ORAL at 15:54

## 2021-03-13 RX ADMIN — HYDROMORPHONE HYDROCHLORIDE 1 MG: 1 INJECTION, SOLUTION INTRAMUSCULAR; INTRAVENOUS; SUBCUTANEOUS at 14:45

## 2021-03-13 ASSESSMENT — ENCOUNTER SYMPTOMS
BACK PAIN: 0
COLOR CHANGE: 0
VOMITING: 0
COUGH: 0
EYES NEGATIVE: 1
NAUSEA: 0
SHORTNESS OF BREATH: 0
CHEST TIGHTNESS: 0
ABDOMINAL PAIN: 0

## 2021-03-13 ASSESSMENT — PAIN DESCRIPTION - LOCATION
LOCATION: LEG
LOCATION: LEG

## 2021-03-13 ASSESSMENT — PAIN DESCRIPTION - PAIN TYPE
TYPE: ACUTE PAIN
TYPE: ACUTE PAIN

## 2021-03-13 ASSESSMENT — PAIN SCALES - GENERAL
PAINLEVEL_OUTOF10: 8

## 2021-03-13 ASSESSMENT — PAIN DESCRIPTION - DESCRIPTORS: DESCRIPTORS: ACHING

## 2021-03-13 ASSESSMENT — PAIN DESCRIPTION - ORIENTATION: ORIENTATION: RIGHT

## 2021-03-13 NOTE — ED NOTES
1505 - PS to Dr Maureen Vines per Hem/Onc consult  Re: Dr Xiao's pt - sickle cell pt  1508 - Dr Maureen Vines called back to speak with STONEY Winchester  03/13/21 3785

## 2021-03-13 NOTE — ED PROVIDER NOTES
abdominal pain, nausea and vomiting. Genitourinary: Negative. Musculoskeletal: Positive for arthralgias (right leg) and myalgias (right leg). Negative for back pain and neck pain. Skin: Negative for color change, rash and wound. Neurological: Negative for headaches. All other systems reviewed and are negative. Except as noted above in the ROS, all other systems were reviewed and negative. 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 Historical Med      hydroxyurea (HYDREA) 500 MG chemo capsule Take 2 capsules by mouth 2 times dailyHistorical Med               ALLERGIES:    Morphine    FAMILY HISTORY:     History reviewed. No pertinent family history.        SOCIAL HISTORY:       Social History     Socioeconomic History    Marital status: Single     Spouse name: None    Number of children: 0    Years of education: None    Highest education level: None   Occupational History    None   Social Needs    Financial resource strain: None    Food insecurity     Worry: None     Inability: None    Transportation needs     Medical: None     Non-medical: None   Tobacco Use    Smoking status: Never Smoker    Smokeless tobacco: Never Used   Substance and Sexual Activity    Alcohol use: Not Currently    Drug use: Never    Sexual activity: Not Currently   Lifestyle    Physical activity     Days per week: None     Minutes per session: None    Stress: None   Relationships    Social connections     Talks on phone: None - 5.90 M/uL    Hemoglobin 9.9 (L) 13.5 - 17.5 g/dL    Hematocrit 28.1 (L) 40.5 - 52.5 %    .5 (H) 80.0 - 100.0 fL    MCH 36.9 (H) 26.0 - 34.0 pg    MCHC 35.3 31.0 - 36.0 g/dL    RDW 21.7 (H) 12.4 - 15.4 %    Platelets 734 886 - 701 K/uL    MPV 8.8 5.0 - 10.5 fL    PLATELET SLIDE REVIEW Adequate     SLIDE REVIEW see below     Path Consult No     Neutrophils % 80.0 %    Lymphocytes % 10.0 %    Monocytes % 7.0 %    Eosinophils % 3.0 %    Basophils % 0.0 %    Neutrophils Absolute 10.9 (H) 1.7 - 7.7 K/uL    Lymphocytes Absolute 1.4 1.0 - 5.1 K/uL    Monocytes Absolute 1.0 0.0 - 1.3 K/uL    Eosinophils Absolute 0.4 0.0 - 0.6 K/uL    Basophils Absolute 0.0 0.0 - 0.2 K/uL    nRBC 2 (A) /100 WBC    Anisocytosis 1+ (A)     Macrocytes 1+ (A)     Polychromasia 1+ (A)     Poikilocytes 2+ (A)     Ovalocytes 1+ (A)     Target Cells Occasional (A)     Sickle Cells 1+ (A)    Comprehensive Metabolic Panel w/ Reflex to MG   Result Value Ref Range    Sodium 140 136 - 145 mmol/L    Potassium reflex Magnesium 4.7 3.5 - 5.1 mmol/L    Chloride 106 99 - 110 mmol/L    CO2 25 21 - 32 mmol/L    Anion Gap 9 3 - 16    Glucose 98 70 - 99 mg/dL    BUN 12 7 - 20 mg/dL    CREATININE 0.7 (L) 0.9 - 1.3 mg/dL    GFR Non-African American >60 >60    GFR African American >60 >60    Calcium 9.9 8.3 - 10.6 mg/dL    Total Protein 8.2 6.4 - 8.2 g/dL    Albumin 4.9 3.4 - 5.0 g/dL    Albumin/Globulin Ratio 1.5 1.1 - 2.2    Total Bilirubin 3.9 (H) 0.0 - 1.0 mg/dL    Alkaline Phosphatase 98 40 - 129 U/L    ALT 29 10 - 40 U/L    AST 42 (H) 15 - 37 U/L    Globulin 3.3 g/dL   Reticulocytes   Result Value Ref Range    Retic Ct Pct 11.67 (H) 0.50 - 2.18 %    Retic Ct Abs 0.313 M/uL    Immature Retic Fract 0.62 (H) 0.21 - 0.37           PROCEDURES:   N/A    CRITICAL CARE TIME:       Due to the immediate potential for life-threatening deterioration due to severe pain requiring multiple doses of IV and oral opiate pain medication., I spent 32 minutes providing critical care. This time is excluding time spent performing procedures. CONSULTS:  IP CONSULT TO HEM/ONC      EMERGENCY DEPARTMENT COURSE and DIFFERENTIAL DIAGNOSIS/MDM:   Vitals:    Vitals:    03/13/21 1323 03/13/21 1414 03/13/21 1557   BP: 125/61 109/63 117/68   Pulse: 66 71 75   Resp: 16 15 14   Temp: 98.8 °F (37.1 °C)     TempSrc: Oral     SpO2: 97% 98% 95%   Weight: 190 lb (86.2 kg)     Height: 5' 8\" (1.727 m)         Patient was given the following medications:  Medications   HYDROmorphone (DILAUDID) injection 1 mg (1 mg Intravenous Given 3/13/21 1341)   HYDROmorphone (DILAUDID) injection 1 mg (1 mg Intravenous Given 3/13/21 1445)   oxyCODONE (ROXICODONE) immediate release tablet 10 mg (10 mg Oral Given 3/13/21 1507)   0.9 % sodium chloride bolus (0 mLs Intravenous Stopped 3/13/21 1606)   oxyCODONE (ROXICODONE) immediate release tablet 10 mg (10 mg Oral Given 3/13/21 1554)       I have evaluated this patient in the ED. Old records were reviewed. Patient arrives to the ED with known sickle cell anemia. He is here fairly regularly for pain as a result of his sickle cell. He is prescribed quite a bit of opiate pain medication as an outpatient by hematology/oncology. The patient was scheduled to undergo infusion for his sickle cell disease yesterday but he canceled this appointment because he was busy. Upon evaluating the patient his physical exam is benign. Vital signs are completely normal.  Due to his known history he is ordered Dilaudid 1 mg IV upon seeing him. On reassessing him, he tells me his pain is not better. Labs are reviewed. CBC white count 13.6 with H&H 9.9 and 28.1. CMP normal  Reticulocyte count 11.67 and immature reticulocyte fraction 0.62. Patient was given another milligram of Dilaudid IV and an oral dose of oxycodone 10 mg (which is what he is prescribed at home. I consulted his hematologist/oncologist.  I spoke with Dr. Jolanta Modi. Dr. Jolanta Modi knows this patient well.   Upon speaking with him I am given the impression that this patient is somewhat unreliable in terms of history and amount of pain he is in. He has been known to overuse/abuse his prescribed medications. He has been seen for overdoses. He has run out of his medications early. I reassessed the patient third time. He is sitting in the stretcher eating michelle crackers with peanut butter and drinking soda. He is watching a movie on his phone. He again tells me that his pain is no better. He does appear comfortable and almost sleepy/lethargic. No indication or objective sign of distress. The patient is given 1 L of normal saline while in the ED and a second dose of oxycodone 10 mg orally. I did let the patient know we were planning to discharge him based on his history, labs and consultation with hematology. At that time the patient let me know that he is almost out of his oxycodone 10 mg tablets. He is not due to get another prescription until he believes Tuesday, 3/16. Patient gives me the impression that he does not have nearly enough pain medication to make it until that time. I told him we would not be able to prescribe further opiate pain medication and he would need to follow-up with his hematologist.  I recommended that in the future he not cancel his infusion appointments as that might help prevent these frequent ER visits. Ultimately after a total of 2 mg of Dilaudid IV and a total of 20 mg of oxycodone orally, the patient is discharged home in stable condition. I estimate there is LOW risk for SEPSIS, RHABDO, ACUTE FRACTURE, COMPARTMENT SYNDROME, DEEP VENOUS THROMBOSIS, SEPTIC ARTHRITIS, TENDON OR NEUROVASCULAR INJURY, thus I consider the discharge disposition reasonable.  [place]. Tiana and I have discussed the diagnosis and risks, and we agree with discharging home to follow-up with their primary doctor or the referral orthopedist. We also discussed returning to the Emergency Department immediately if new or worsening symptoms occur. We have discussed the symptoms which are most concerning (e.g., changing or worsening pain, numbness, weakness) that necessitate immediate return. FINAL IMPRESSION:      1. Sickle cell pain crisis ([address]    2.  Right leg pain          DISPOSITION/PLAN:   DISPOSITION     DISCHARGE    PATIENT REFERRED TO:  Staci Thakur MD  [address]  [address] [facility] [address] [address]  [phone]          [facility]  [address] [id number] [phone]  Go to   As needed, If symptoms worsen      DISCHARGE MEDICATIONS:  Discharge Medication List as of 3/13/2021  3:47 PM                     (Please note thatportions of this note were completed with a voice recognition program.  Efforts were made to edit the dictations, but occasionally words are mis-transcribed.)    Jaylene Blue PA-C (electronicallysigned)              Dorian Baptiste, [address]  03/13/21 6793

## 2021-03-26 ENCOUNTER — HOSPITAL ENCOUNTER (EMERGENCY)
Age: 21
Discharge: HOME | End: 2021-03-26
Payer: COMMERCIAL

## 2021-03-26 VITALS
HEART RATE: 78 BPM | DIASTOLIC BLOOD PRESSURE: 60 MMHG | WEIGHT: 190 LBS | OXYGEN SATURATION: 96 % | TEMPERATURE: 98.8 F | SYSTOLIC BLOOD PRESSURE: 116 MMHG | HEIGHT: 68 IN | BODY MASS INDEX: 28.79 KG/M2 | RESPIRATION RATE: 18 BRPM

## 2021-03-26 DIAGNOSIS — D57.00 HB-SS DISEASE WITH CRISIS (HCC): Primary | ICD-10-CM

## 2021-03-26 LAB
A/G RATIO: 1.9 (ref 1.1–2.2)
ALBUMIN SERPL-MCNC: 4.9 G/DL (ref 3.4–5)
ALP BLD-CCNC: 98 U/L (ref 40–129)
ALT SERPL-CCNC: 37 U/L (ref 10–40)
ANION GAP SERPL CALCULATED.3IONS-SCNC: 10 MMOL/L (ref 3–16)
AST SERPL-CCNC: 40 U/L (ref 15–37)
BASOPHILS ABSOLUTE: 0.1 K/UL (ref 0–0.2)
BASOPHILS RELATIVE PERCENT: 0.8 %
BILIRUB SERPL-MCNC: 3.1 MG/DL (ref 0–1)
BUN BLDV-MCNC: 8 MG/DL (ref 7–20)
CALCIUM SERPL-MCNC: 9.8 MG/DL (ref 8.3–10.6)
CHLORIDE BLD-SCNC: 106 MMOL/L (ref 99–110)
CO2: 25 MMOL/L (ref 21–32)
CREAT SERPL-MCNC: 0.7 MG/DL (ref 0.9–1.3)
EOSINOPHILS ABSOLUTE: 0.1 K/UL (ref 0–0.6)
EOSINOPHILS RELATIVE PERCENT: 0.9 %
GFR AFRICAN AMERICAN: >60
GFR NON-AFRICAN AMERICAN: >60
GLOBULIN: 2.6 G/DL
GLUCOSE BLD-MCNC: 90 MG/DL (ref 70–99)
HCT VFR BLD CALC: 27 % (ref 40.5–52.5)
HEMATOLOGY PATH CONSULT: NO
HEMOGLOBIN: 9.5 G/DL (ref 13.5–17.5)
IMMATURE RETIC FRACT: 0.58 (ref 0.21–0.37)
LYMPHOCYTES ABSOLUTE: 1.4 K/UL (ref 1–5.1)
LYMPHOCYTES RELATIVE PERCENT: 10.9 %
MCH RBC QN AUTO: 37.2 PG (ref 26–34)
MCHC RBC AUTO-ENTMCNC: 35.3 G/DL (ref 31–36)
MCV RBC AUTO: 105.4 FL (ref 80–100)
MONOCYTES ABSOLUTE: 0.7 K/UL (ref 0–1.3)
MONOCYTES RELATIVE PERCENT: 5.6 %
NEUTROPHILS ABSOLUTE: 10.7 K/UL (ref 1.7–7.7)
NEUTROPHILS RELATIVE PERCENT: 81.8 %
PDW BLD-RTO: 19.2 % (ref 12.4–15.4)
PLATELET # BLD: 381 K/UL (ref 135–450)
PLATELET SLIDE REVIEW: ADEQUATE
PMV BLD AUTO: 8.4 FL (ref 5–10.5)
POTASSIUM REFLEX MAGNESIUM: 4.1 MMOL/L (ref 3.5–5.1)
RBC # BLD: 2.56 M/UL (ref 4.2–5.9)
REASON FOR REJECTION: NORMAL
REJECTED TEST: NORMAL
RETICULOCYTE ABSOLUTE COUNT: 0.24 M/UL
RETICULOCYTE COUNT PCT: 9.23 % (ref 0.5–2.18)
SLIDE REVIEW: ABNORMAL
SODIUM BLD-SCNC: 141 MMOL/L (ref 136–145)
SPECIMEN STATUS: NORMAL
TOTAL PROTEIN: 7.5 G/DL (ref 6.4–8.2)
WBC # BLD: 13.1 K/UL (ref 4–11)

## 2021-03-26 PROCEDURE — 6370000000 HC RX 637 (ALT 250 FOR IP): Performed by: PHYSICIAN ASSISTANT

## 2021-03-26 PROCEDURE — 85045 AUTOMATED RETICULOCYTE COUNT: CPT

## 2021-03-26 PROCEDURE — 85025 COMPLETE CBC W/AUTO DIFF WBC: CPT

## 2021-03-26 PROCEDURE — 96374 THER/PROPH/DIAG INJ IV PUSH: CPT

## 2021-03-26 PROCEDURE — 99284 EMERGENCY DEPT VISIT MOD MDM: CPT

## 2021-03-26 PROCEDURE — 80053 COMPREHEN METABOLIC PANEL: CPT

## 2021-03-26 PROCEDURE — 6360000002 HC RX W HCPCS: Performed by: PHYSICIAN ASSISTANT

## 2021-03-26 RX ORDER — OXYCODONE HYDROCHLORIDE 5 MG/1
10 TABLET ORAL ONCE
Status: COMPLETED | OUTPATIENT
Start: 2021-03-26 | End: 2021-03-26

## 2021-03-26 RX ADMIN — OXYCODONE 10 MG: 5 TABLET ORAL at 17:57

## 2021-03-26 RX ADMIN — HYDROMORPHONE HYDROCHLORIDE 1 MG: 1 INJECTION, SOLUTION INTRAMUSCULAR; INTRAVENOUS; SUBCUTANEOUS at 15:39

## 2021-03-26 ASSESSMENT — PAIN SCALES - GENERAL
PAINLEVEL_OUTOF10: 9
PAINLEVEL_OUTOF10: 10
PAINLEVEL_OUTOF10: 8

## 2021-03-26 ASSESSMENT — PAIN DESCRIPTION - ORIENTATION: ORIENTATION: RIGHT;LEFT

## 2021-03-26 NOTE — ED PROVIDER NOTES
[facility] Emergency Department    CHIEF COMPLAINT  Sickle Cell Pain Crisis (Pain in the knees on Tuesday from sickle cell)      SHARED SERVICE VISIT  Evaluated by SHONNA. My supervising physician was available for consultation. HISTORY OF PRESENT ILLNESS  Bhavya Edwards is a 23 y.o. male the history of sickle cell crisis presents the emergency department for evaluation of acute lateral knee pain that has been ongoing since Tuesday. Patient states that the oral OxyContin pain medication, usually controls his pain is not controlling at this time. Patient states that he did see his hematologist recently and at that time his current pain regimen was sufficient. He is denying any injury, swelling, shortness of breath, chest pain, nausea, vomiting. No other complaints, modifying factors or associated symptoms. Nursing notes reviewed. 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 No pertinent family history.   Social History     Socioeconomic History    Marital status: Single     Spouse name: Not on file    Number of children: 0    Years of education: Not on file    Highest education level: Not on file   Occupational History    Not on file   Social Needs    Financial resource strain: Not on file    Food insecurity     Worry: Not on file     Inability: Not on file    Transportation needs     Medical: Not on file     Non-medical: Not on file   Tobacco Use    Smoking status: Never Smoker    Smokeless tobacco: Never Used   Substance and Sexual Activity    Alcohol use: Not Currently    Drug use: Never    Sexual activity: Not Currently   Lifestyle    Physical activity     Days per week: Not on file     Minutes per session: Not on file    Stress: Not on file   Relationships    Social connections     Talks on phone: Not on file     Gets together: Not on file     Attends Mandaeism service: Not on file     Active member of club or organization: Not on file     Attends meetings of clubs or organizations: Not on file     Relationship status: Not on file    Intimate partner violence     Fear of current or ex partner: Not on file     Emotionally abused: Not on file     Physically abused: Not on file     Forced sexual activity: Not on file   Other Topics Concern    Not on file   Social History Narrative    Not on file     Current Facility-Administered Medications   Medication Dose Route Frequency Provider Last Rate Last Admin    HYDROmorphone (DILAUDID) injection 1 mg  1 mg Intravenous Once Lars Samano PA-C   Stopped at 03/26/21 6232     Current Outpatient Medications   Medication Sig Dispense Refill    oxyCODONE 5 MG capsule Take 10 mg by mouth every 4 hours as needed for Pain.  hydroxyurea (HYDREA) 500 MG chemo capsule Take 2 capsules by mouth 2 times daily       Allergies   Allergen Reactions    Morphine Shortness Of Breath     Other reaction(s): Histamine-like Reaction  Has asthma exacerbation with morphine-histamine type reaction         REVIEW OF SYSTEMS  10 systems reviewed, pertinent positives per HPI otherwise noted to be negative    PHYSICAL EXAM  /60   Pulse 78   Temp 98.8 °F (37.1 °C)   Resp 18   Ht 5' 8\" (1.727 m)   Wt 190 lb (86.2 kg)   SpO2 96%   BMI 28.89 kg/m²   GENERAL APPEARANCE: Awake and alert. Cooperative. HEAD: Normocephalic. Atraumatic. EYES: PERRL. EOM's grossly intact. ENT: Mucous membranes are moist.   NECK: Supple. HEART: RRR. No murmurs. LUNGS: Respirations unlabored. CTAB. Good air exchange. Speaking comfortably in full sentences. ABDOMEN: Soft. Non-distended. Non-tender. No guarding or rebound. No masses. No organomegaly.    EXTREMITIES: There is no edema appreciated in the bilateral lower extremities. Posterior tibialis +3 bilateral.  Patient does express tenderness with mild palpation of the bilateral knees. No gross bone deformities appreciated. SKIN: Warm and dry. No acute rashes. NEUROLOGICAL: Alert and oriented. CN's 2-12 intact. No gross facial drooping. Strength 5/5, sensation intact. PSYCHIATRIC: Normal mood and affect.     RADIOLOGY      LABS  Labs Reviewed   COMPREHENSIVE METABOLIC PANEL W/ REFLEX TO MG FOR LOW K - Abnormal; Notable for the following components:       Result Value    CREATININE 0.7 (*)     Total Bilirubin 3.1 (*)     AST 40 (*)     All other components within normal limits    Narrative:     CALL  [place]  Nelson Roche [phone],  Rejected Test Name CBCWD/RETC/Called to: Jamey Christopher, 03/26/2021 16:04, by  Bakari Louis  Performed at:  [place] [address],  [place], [address]   Phone [phone]   CBC WITH AUTO DIFFERENTIAL - Abnormal; Notable for the following components:    WBC 13.1 (*)     RBC 2.56 (*)     Hemoglobin 9.5 (*)     Hematocrit 27.0 (*)     .4 (*)     MCH 37.2 (*)     RDW 19.2 (*)     Neutrophils Absolute 10.7 (*)     All other components within normal limits    Narrative:     Performed at:  [address] of [facility],  [place], [address]   Phone [phone]   RETICULOCYTES - Abnormal; Notable for the following components:    Retic Ct Pct 9.23 (*)     Immature Retic Fract 0.58 (*)     All other components within normal limits    Narrative:     Performed at:  [address],  [place], [address]   Phone [phone]    Narrative:     Jayne Rodriguez tel. [phone],  Rejected Test Name CBCWD/RETC/Called to: Jamey Christopher, 03/26/2021 16:04, by  Bakari Louis  Performed at:  [address],  [place], [address]   Phone [phone]   SAMPLE POSSIBLE BLOOD BANK TESTING Narrative:     CALL  [place]  Sana Aldana [phone],  Rejected Test Name CBCWD/RETC/Called to: Minna Byrne, 03/26/2021 16:04, by  Ronald Marr  Performed at:  [address]   Phone [phone]       PROCEDURES  Unless otherwise noted below, none  Procedures    ED COURSE  Received 1 mg of hydromorphine initially. On reevaluation patient still reports assisting pain. Will trial initial dose at this time. CRITICAL CARE TIME  7 Minutes of critical care time spent not including separately billable procedures. MDM  MDM  Number of Diagnoses or Management Options     Amount and/or Complexity of Data Reviewed  Clinical lab tests: ordered and reviewed  Tests in the radiology section of CPT®: ordered and reviewed  Discussion of test results with the performing providers: yes  Obtain history from someone other than the patient: yes  Review and summarize past medical records: yes  Discuss the patient with other providers: yes    Critical Care  Total time providing critical care: < 30 minutes    Patient Progress  Patient progress: improved    74-year-old male history of recurrent sickle cell pain crisis. Patient manages his his pain at home with 50 tablets OxyContin and milligrams every 8 days. Patient states that currently episode is not managing his pain. \Patient was seen here recently roughly 13 days ago for similar crisis. We will treat patient's pain at this time and reassess. On reevaluation patient was requesting additional pain medication at this time however given his decrease in blood pressure and history of requiring Narcan for opiate-induced respiratory failure for him that I am unable to redose any additional IV narcotic medication at this time. On reevaluation patient appeared to be sitting comfortably and interacting on his cellular device.   Ping with the patient the plan for disposition and he states that he currently does not have any additional p.o. pain medication. I informed patient that due to his high current narcotic pain regimen I am unable to provide him with any additional pain medication outpatient setting. Informed him that the medication he is receiving is currently above what I feel comfortable with discharging patient home with no follow-up. The patient that I am willing to give him a single dose of his p.o. pain medication now prior to patient was advised to follow-up with their primary care provider to be seen within 48 hours. Patient was advised on when to return to the emergency department and able to verbalize understanding of this plan. Was given the opportunity to ask questions and all questions were answered. Was provided with educational material regarding the diagnosis and plan for disposition. Patient was discharged home in a stable condition. Encouraged the patient that he needs to contact his primary care and heme-onc provider to discuss management of his pain. For the patient that he is able to return the emergency department if his pain were to continue or unmanageable outside of the emergency department. Patient was agreeable this plan. Patient was discharged to home in good condition. CLINICAL IMPRESSION  1.  Hb-SS disease with crisis [facility])           Lars Samano PA-C  03/26/21 [address] [place] [address] Vi Morton PA-C  03/26/21 Bety Hung

## 2021-03-26 NOTE — ED NOTES
Pt state that he just got his medication filled on Tuesday 03/23/21 and is now out of all of his medication. Pt state that he felt that he had to take more medication d/t his pain was not under control and now has used up his medication in 3 days. Pt called his hematology Md to see what he should to next.      Garland Pedroza RN  03/26/21 8129

## 2021-04-05 ENCOUNTER — HOSPITAL ENCOUNTER (EMERGENCY)
Age: 21
Discharge: HOME | End: 2021-04-05
Payer: COMMERCIAL

## 2021-04-05 ENCOUNTER — APPOINTMENT (OUTPATIENT)
Dept: GENERAL RADIOLOGY | Age: 21
End: 2021-04-05
Payer: COMMERCIAL

## 2021-04-05 ENCOUNTER — TELEPHONE (OUTPATIENT)
Dept: ORTHOPEDIC SURGERY | Age: 21
End: 2021-04-05

## 2021-04-05 VITALS
TEMPERATURE: 98.1 F | RESPIRATION RATE: 16 BRPM | SYSTOLIC BLOOD PRESSURE: 135 MMHG | BODY MASS INDEX: 27.37 KG/M2 | DIASTOLIC BLOOD PRESSURE: 72 MMHG | WEIGHT: 180 LBS | HEART RATE: 80 BPM | OXYGEN SATURATION: 97 %

## 2021-04-05 DIAGNOSIS — M25.572 ACUTE LEFT ANKLE PAIN: Primary | ICD-10-CM

## 2021-04-05 PROCEDURE — 6370000000 HC RX 637 (ALT 250 FOR IP): Performed by: NURSE PRACTITIONER

## 2021-04-05 PROCEDURE — 99284 EMERGENCY DEPT VISIT MOD MDM: CPT

## 2021-04-05 PROCEDURE — 73610 X-RAY EXAM OF ANKLE: CPT

## 2021-04-05 RX ORDER — OXYCODONE HYDROCHLORIDE 5 MG/1
5 TABLET ORAL ONCE
Status: COMPLETED | OUTPATIENT
Start: 2021-04-05 | End: 2021-04-05

## 2021-04-05 RX ADMIN — OXYCODONE 5 MG: 5 TABLET ORAL at 12:18

## 2021-04-05 ASSESSMENT — PAIN SCALES - GENERAL: PAINLEVEL_OUTOF10: 7

## 2021-04-05 NOTE — ED PROVIDER NOTES
XRAY VIEWS OF THE LEFT ANKLE 4/5/2021 11:29 am COMPARISON: None. HISTORY: ORDERING SYSTEM PROVIDED HISTORY: injury TECHNOLOGIST PROVIDED HISTORY: Reason for exam:->injury Reason for Exam: injury Acuity: Acute Type of Exam: Initial FINDINGS: No evidence of fracture or dislocation. No bony or joint abnormality     Negative       ED COURSE/MDM  Patient seen and evaluated. Old records reviewed. Diagnostic testing reviewed and results discussed. I have independently evaluated this patient based upon my scope of practice. Supervising physician was in the department as needed for consultation. Kirt Alva presented to the ED today with above noted complaints. X-ray of the left ankle is negative no acute fracture. We discussed supportive therapies such as rice, crutches as needed for nonweightbearing, patient has a pair crutches at home.,  Tylenol, ibuprofen. Patient presented with an Ace wrap and was provided with an ankle stirrup for stabilization. Patient agreeable with this plan of care. Prior to discharge patient requesting oxycodone. Patient reports that this is what he typically takes for his \"sickle cell pain. \"  Patient denies chest pain or shortness of breath. Patient feels as though his ankle injury is \"flaring up his sickle cell\" pain is localized to the left ankle. Patient given 1 dose in the emergency department and instructed to follow-up with his hematologist and orthopedics for further management of pain. Patient agreeable with this plan of care. At this point I do not feel the patient requires further work up and it is reasonable to discharge the patient. A discussion was had with the patient and/or their surrogate regarding diagnosis, diagnostic testing results, treatment/ plan of care, and follow up. There was shared decision-making between myself as well as the patient and/or their surrogate and we are all in agreement with discharge home.   There was an opportunity for questions and all questions were answered to the best of my ability and to the satisfaction of the patient and/or patient family. Patient will follow up with ortho as needed for further evaluation/treatment. The patient was given strict return precautions as we discussed symptoms that would necessitate return to the ED. Patient will return to ED for new/worsening symptoms. The patient verbalized their understanding and agreement with the above plan. Please refer to AVS for further details regarding discharge instructions. No results found for this visit on 04/05/21. I estimate there is LOW risk for COMPARTMENT SYNDROME, DEEP VENOUS THROMBOSIS, SEPTIC ARTHRITIS, TENDON OR NEUROVASCULAR INJURY, thus I consider the discharge disposition reasonable. Saranya. Tiana and I have discussed the diagnosis and risks, and we agree with discharging home to follow-up with their primary doctor or the referral orthopedist. We also discussed returning to the Emergency Department immediately if new or worsening symptoms occur. We have discussed the symptoms which are most concerning (e.g., changing or worsening pain, numbness, weakness) that necessitate immediate return. Final Impression    1. Acute left ankle pain        Blood pressure 135/72, pulse 85, temperature 98.1 °F (36.7 °C), temperature source Oral, resp. rate 17, weight 180 lb (81.6 kg), SpO2 95 %. FOLLOW UP  Lillie Butler MD  [place] [address]  [phone]    Call   As needed, follow up    [facility]  ED  [address]          DISPOSITION  Patient was discharged to home in good condition. Comment: Please note this report has been produced using speech recognition software and may contain errors related to that system including errors in grammar, punctuation, and spelling, as well as words and phrases that may be inappropriate.  If there are any questions or concerns please

## 2021-04-05 NOTE — ED NOTES
Placed pt's left ankle in [facility]. Pt tolerated application and acknowledged all care taking instructions.      Bret Amato  04/05/21 2316

## 2021-04-05 NOTE — ED NOTES
Discharge instructions were reviewed with the patient and the patient verbalized understanding. Patient stable and brought out of ED via wheelchair to head home with family.      Roz Holloway RN  04/05/21 6399

## 2021-04-05 NOTE — ED NOTES
RN to bedside to discharge patient. Patient requesting to speak to Kemal fischer NP prior to signing discharge paperwork. Kemal fischer NP aware.       Natalia Schirmer, RN  04/05/21 2486

## 2021-04-19 ENCOUNTER — HOSPITAL ENCOUNTER (EMERGENCY)
Age: 21
Discharge: HOME | End: 2021-04-19
Attending: EMERGENCY MEDICINE
Payer: COMMERCIAL

## 2021-04-19 VITALS
SYSTOLIC BLOOD PRESSURE: 124 MMHG | HEIGHT: 68 IN | TEMPERATURE: 98.1 F | BODY MASS INDEX: 29.55 KG/M2 | OXYGEN SATURATION: 98 % | HEART RATE: 80 BPM | DIASTOLIC BLOOD PRESSURE: 76 MMHG | RESPIRATION RATE: 18 BRPM | WEIGHT: 195 LBS

## 2021-04-19 DIAGNOSIS — D57.00 SICKLE CELL PAIN CRISIS (HCC): Primary | ICD-10-CM

## 2021-04-19 LAB
A/G RATIO: 1.5 (ref 1.1–2.2)
ALBUMIN SERPL-MCNC: 4.5 G/DL (ref 3.4–5)
ALP BLD-CCNC: 69 U/L (ref 40–129)
ALT SERPL-CCNC: 30 U/L (ref 10–40)
ANION GAP SERPL CALCULATED.3IONS-SCNC: 9 MMOL/L (ref 3–16)
AST SERPL-CCNC: 48 U/L (ref 15–37)
BASOPHILS ABSOLUTE: 0.3 K/UL (ref 0–0.2)
BASOPHILS RELATIVE PERCENT: 2.4 %
BILIRUB SERPL-MCNC: 3.5 MG/DL (ref 0–1)
BUN BLDV-MCNC: 5 MG/DL (ref 7–20)
CALCIUM SERPL-MCNC: 9.5 MG/DL (ref 8.3–10.6)
CHLORIDE BLD-SCNC: 105 MMOL/L (ref 99–110)
CO2: 25 MMOL/L (ref 21–32)
CREAT SERPL-MCNC: 0.7 MG/DL (ref 0.9–1.3)
EOSINOPHILS ABSOLUTE: 0.2 K/UL (ref 0–0.6)
EOSINOPHILS RELATIVE PERCENT: 2.1 %
GFR AFRICAN AMERICAN: >60
GFR NON-AFRICAN AMERICAN: >60
GLOBULIN: 3 G/DL
GLUCOSE BLD-MCNC: 92 MG/DL (ref 70–99)
HCT VFR BLD CALC: 25.1 % (ref 40.5–52.5)
HEMOGLOBIN: 8.6 G/DL (ref 13.5–17.5)
IMMATURE RETIC FRACT: 0.45 (ref 0.21–0.37)
LYMPHOCYTES ABSOLUTE: 1.2 K/UL (ref 1–5.1)
LYMPHOCYTES RELATIVE PERCENT: 10.9 %
MCH RBC QN AUTO: 36.5 PG (ref 26–34)
MCHC RBC AUTO-ENTMCNC: 34.4 G/DL (ref 31–36)
MCV RBC AUTO: 106 FL (ref 80–100)
MONOCYTES ABSOLUTE: 1 K/UL (ref 0–1.3)
MONOCYTES RELATIVE PERCENT: 9.6 %
NEUTROPHILS ABSOLUTE: 8.1 K/UL (ref 1.7–7.7)
NEUTROPHILS RELATIVE PERCENT: 75 %
PDW BLD-RTO: 21.4 % (ref 12.4–15.4)
PLATELET # BLD: 300 K/UL (ref 135–450)
PMV BLD AUTO: 8.8 FL (ref 5–10.5)
POTASSIUM SERPL-SCNC: 4.9 MMOL/L (ref 3.5–5.1)
RBC # BLD: 2.37 M/UL (ref 4.2–5.9)
RETICULOCYTE ABSOLUTE COUNT: 0.58 M/UL
RETICULOCYTE COUNT PCT: 24.53 % (ref 0.5–2.18)
SODIUM BLD-SCNC: 139 MMOL/L (ref 136–145)
TOTAL PROTEIN: 7.5 G/DL (ref 6.4–8.2)
WBC # BLD: 10.7 K/UL (ref 4–11)

## 2021-04-19 PROCEDURE — 2580000003 HC RX 258: Performed by: EMERGENCY MEDICINE

## 2021-04-19 PROCEDURE — 85025 COMPLETE CBC W/AUTO DIFF WBC: CPT

## 2021-04-19 PROCEDURE — 99284 EMERGENCY DEPT VISIT MOD MDM: CPT

## 2021-04-19 PROCEDURE — 80053 COMPREHEN METABOLIC PANEL: CPT

## 2021-04-19 PROCEDURE — 96374 THER/PROPH/DIAG INJ IV PUSH: CPT

## 2021-04-19 PROCEDURE — 85045 AUTOMATED RETICULOCYTE COUNT: CPT

## 2021-04-19 PROCEDURE — 6360000002 HC RX W HCPCS: Performed by: EMERGENCY MEDICINE

## 2021-04-19 RX ORDER — TRAMADOL HYDROCHLORIDE 50 MG/1
TABLET ORAL
Status: ON HOLD | COMMUNITY
Start: 2021-04-10 | End: 2021-05-03

## 2021-04-19 RX ORDER — 0.9 % SODIUM CHLORIDE 0.9 %
1000 INTRAVENOUS SOLUTION INTRAVENOUS ONCE
Status: COMPLETED | OUTPATIENT
Start: 2021-04-19 | End: 2021-04-19

## 2021-04-19 RX ORDER — OXYCODONE HCL 20 MG/1
TABLET, FILM COATED, EXTENDED RELEASE ORAL
Status: ON HOLD | COMMUNITY
Start: 2021-04-12 | End: 2021-10-20 | Stop reason: HOSPADM

## 2021-04-19 RX ADMIN — SODIUM CHLORIDE 1000 ML: 9 INJECTION, SOLUTION INTRAVENOUS at 13:49

## 2021-04-19 RX ADMIN — HYDROMORPHONE HYDROCHLORIDE 1 MG: 1 INJECTION, SOLUTION INTRAMUSCULAR; INTRAVENOUS; SUBCUTANEOUS at 13:49

## 2021-04-19 ASSESSMENT — PAIN SCALES - GENERAL: PAINLEVEL_OUTOF10: 6

## 2021-04-19 ASSESSMENT — PAIN DESCRIPTION - ORIENTATION: ORIENTATION: RIGHT

## 2021-04-19 NOTE — ED PROVIDER NOTES
HOURS. TAKE WITH ENOUGH WATER TO SWALLOW WHOLE. DO NOT CRUSH/DISSOLVE/CHEW/CUT/BREAK. OXYCODONE 5 MG CAPSULE    Take 10 mg by mouth every 4 hours as needed for Pain. TRAMADOL (ULTRAM) 50 MG TABLET           ALLERGIES     Morphine    FAMILY HISTORY     History reviewed. No pertinent family history.        SOCIAL HISTORY       Social History     Socioeconomic History    Marital status: Single     Spouse name: None    Number of children: 0    Years of education: None    Highest education level: None   Occupational History    None   Social Needs    Financial resource strain: None    Food insecurity     Worry: None     Inability: None    Transportation needs     Medical: None     Non-medical: None   Tobacco Use    Smoking status: Never Smoker    Smokeless tobacco: Never Used   Substance and Sexual Activity    Alcohol use: Not Currently    Drug use: Never    Sexual activity: Not Currently   Lifestyle    Physical activity     Days per week: None     Minutes per session: None    Stress: None   Relationships    Social connections     Talks on phone: None     Gets together: None     Attends Yazidi service: None     Active member of club or organization: None     Attends meetings of clubs or organizations: None     Relationship status: None    Intimate partner violence     Fear of current or ex partner: None     Emotionally abused: None     Physically abused: None     Forced sexual activity: None   Other Topics Concern    None   Social History Narrative    None       SCREENINGS    [place] Coma Scale  Eye Opening: Spontaneous  Best Verbal Response: Oriented  Best Motor Response: Obeys commands  [place] Coma Scale Score: 15          PHYSICAL EXAM    (up to 7 for level 4, 8 or more for level 5)     ED Triage Vitals   BP Temp Temp Source Pulse Resp SpO2 Height Weight   04/19/21 1250 04/19/21 1250 04/19/21 1250 04/19/21 1250 04/19/21 1250 04/19/21 1250 04/19/21 1248 04/19/21 1248   130/82 98.1 °F (36.7 °C) Oral 73 20 98 % 5' 8\" (1.727 m) 195 lb (88.5 kg)       Physical Exam    General appearance:  Cooperative. No acute distress. Lying in bed watching his telephone  Skin:  Warm. Dry. Eye:  Extraocular movements intact. Ears, nose, mouth and throat:  Oral mucosa moist,  Neck:  Trachea midline. Heart:  Regular rate and rhythm  Perfusion:  intact  Respiratory:  Lungs clear to auscultation bilaterally. Respirations nonlabored. Abdominal:   Non distended. Nontender  Neurological:  Alert and oriented x 3. Moves all extremities spontaneously  Musculoskeletal:   Normal ROM, no deformities. Patient does have some pain with range of motion of the right knee, hip and shoulder. No pain with passive range of motion. No overlying erythema or warmth of the joints.           Psychiatric:  Normal mood      DIAGNOSTIC RESULTS       Labs Reviewed   CBC WITH AUTO DIFFERENTIAL - Abnormal; Notable for the following components:       Result Value    RBC 2.37 (*)     Hemoglobin 8.6 (*)     Hematocrit 25.1 (*)     .0 (*)     MCH 36.5 (*)     RDW 21.4 (*)     Neutrophils Absolute 8.1 (*)     Basophils Absolute 0.3 (*)     All other components within normal limits    Narrative:     Performed at:  [place] [id number],  [place], [address]   Phone [phone]   COMPREHENSIVE METABOLIC PANEL - Abnormal; Notable for the following components:    BUN 5 (*)     CREATININE 0.7 (*)     Total Bilirubin 3.5 (*)     AST 48 (*)     All other components within normal limits    Narrative:     Performed at:  [address],  [place], [address]   Phone [phone]   RETICULOCYTES - Abnormal; Notable for the following components:    Retic Ct Pct 24.53 (*)     Immature Retic Fract 0.45 (*)     All other components within normal limits    Narrative:     Performed at:  [facility]  [address] [place] [address], [place], [place]   Phone [phone]       Interpretation per the Radiologist below, if obtained/available at the time of this note:    No orders to display       All other labs/imaging were within normal range or not returned as of this dictation. EMERGENCY DEPARTMENT COURSE and DIFFERENTIAL DIAGNOSIS/MDM:   Vitals:    Vitals:    04/19/21 1248 04/19/21 1250   BP:  130/82   Pulse:  73   Resp:  20   Temp:  98.1 °F (36.7 °C)   TempSrc:  Oral   SpO2:  98%   Weight: 195 lb (88.5 kg)    Height: 5' 8\" (1.727 m)        Patient presents emergency department today complaining of right knee, hip and shoulder pain. Well-known to emergency department staff for multiple frequent presentations of similar complaints. No chest pain or shortness of breath. No fevers or chills. Labs do show elevated reticulocyte count consistent with likely crisis. Patient was given IV fluids and pain medication here and has been sleeping throughout his course. Reevaluation is feeling somewhat improved will be discharged home to follow-up with his hematologist as an outpatient. MDM    CONSULTS     None    Critical Care:   None    REASSESSMENT          PROCEDURE     Unless otherwise noted below, none     Procedures      FINAL IMPRESSION      1. Sickle cell pain crisis [facility])            DISPOSITION/PLAN   DISPOSITION Decision To Discharge 04/19/2021 02:57:04 PM        PATIENT REFERRED TO:  Leo Almendarez MD  [address] [facility] [address]  [phone]    Schedule an appointment as soon as possible for a visit         DISCHARGE MEDICATIONS:  New Prescriptions    No medications on file     Controlled Substances Monitoring:     No flowsheet data found.     (Please note that portions of this note were completed with a voice recognition program.  Efforts were made to edit the dictations but occasionally words are mis-transcribed.)    Alfonso Grossman MD (electronically signed)  Attending Emergency Physician Rolando Sadler MD  04/19/21 1981

## 2021-04-19 NOTE — ED NOTES
--Patient provided with discharge instructions and any prescriptions. --Instructions, dosing, and follow-up appointments reviewed with patient/family. No further questions or needs at this time. --Vital signs and patient stable upon discharge. --Patient ambulatory to [facility].        Jan Staples RN  04/19/21 0725

## 2021-04-19 NOTE — ED NOTES
Pt ambulatory with steady gait and without use of assistive devices. Pt denies lightheaded or dizziness.       Jesús Moses RN  04/19/21 8055

## 2021-04-19 NOTE — ED NOTES
Pt alert and oriented and complains of R hip/arm pain. Starts started today and has gradually worsened. VSS. Pt call light within reach and bed alarm on.       Tresea Holstein, RN  04/19/21 4645

## 2021-04-19 NOTE — ED NOTES
--Patient provided with discharge instructions and any prescriptions. --Instructions, dosing, and follow-up appointments reviewed with patient/family. No further questions or needs at this time. --Vital signs and patient stable upon discharge. --Patient ambulatory to [facility].        Felisa Velez RN  04/19/21 1127

## 2021-05-03 ENCOUNTER — APPOINTMENT (OUTPATIENT)
Dept: GENERAL RADIOLOGY | Age: 21
DRG: 812 | End: 2021-05-03
Payer: COMMERCIAL

## 2021-05-03 ENCOUNTER — HOSPITAL ENCOUNTER (INPATIENT)
Age: 21
LOS: 2 days | Discharge: HOME | DRG: 812 | End: 2021-05-05
Attending: EMERGENCY MEDICINE | Admitting: INTERNAL MEDICINE
Payer: COMMERCIAL

## 2021-05-03 DIAGNOSIS — D57.00 SICKLE CELL PAIN CRISIS (HCC): Primary | ICD-10-CM

## 2021-05-03 LAB
A/G RATIO: 1.5 (ref 1.1–2.2)
ALBUMIN SERPL-MCNC: 4.4 G/DL (ref 3.4–5)
ALP BLD-CCNC: 84 U/L (ref 40–129)
ALT SERPL-CCNC: 19 U/L (ref 10–40)
ANION GAP SERPL CALCULATED.3IONS-SCNC: 8 MMOL/L (ref 3–16)
ANISOCYTOSIS: ABNORMAL
AST SERPL-CCNC: 27 U/L (ref 15–37)
BASOPHILS ABSOLUTE: 0 K/UL (ref 0–0.2)
BASOPHILS RELATIVE PERCENT: 0 %
BILIRUB SERPL-MCNC: 4.1 MG/DL (ref 0–1)
BILIRUBIN URINE: NEGATIVE
BLOOD, URINE: NEGATIVE
BUN BLDV-MCNC: 7 MG/DL (ref 7–20)
CALCIUM SERPL-MCNC: 9.3 MG/DL (ref 8.3–10.6)
CHLORIDE BLD-SCNC: 107 MMOL/L (ref 99–110)
CLARITY: CLEAR
CO2: 25 MMOL/L (ref 21–32)
COLOR: YELLOW
CREAT SERPL-MCNC: 0.8 MG/DL (ref 0.9–1.3)
EKG ATRIAL RATE: 70 BPM
EKG DIAGNOSIS: NORMAL
EKG P AXIS: 6 DEGREES
EKG P-R INTERVAL: 144 MS
EKG Q-T INTERVAL: 386 MS
EKG QRS DURATION: 94 MS
EKG QTC CALCULATION (BAZETT): 416 MS
EKG R AXIS: 71 DEGREES
EKG T AXIS: 29 DEGREES
EKG VENTRICULAR RATE: 70 BPM
EOSINOPHILS ABSOLUTE: 0.2 K/UL (ref 0–0.6)
EOSINOPHILS RELATIVE PERCENT: 1 %
GFR AFRICAN AMERICAN: >60
GFR NON-AFRICAN AMERICAN: >60
GLOBULIN: 3 G/DL
GLUCOSE BLD-MCNC: 105 MG/DL (ref 70–99)
GLUCOSE URINE: NEGATIVE MG/DL
HCT VFR BLD CALC: 25.6 % (ref 40.5–52.5)
HEMATOLOGY PATH CONSULT: NO
HEMOGLOBIN: 8.8 G/DL (ref 13.5–17.5)
IMMATURE RETIC FRACT: 0.65 (ref 0.21–0.37)
KETONES, URINE: NEGATIVE MG/DL
LEUKOCYTE ESTERASE, URINE: NEGATIVE
LYMPHOCYTES ABSOLUTE: 2.6 K/UL (ref 1–5.1)
LYMPHOCYTES RELATIVE PERCENT: 14 %
MACROCYTES: ABNORMAL
MCH RBC QN AUTO: 35.2 PG (ref 26–34)
MCHC RBC AUTO-ENTMCNC: 34.3 G/DL (ref 31–36)
MCV RBC AUTO: 102.6 FL (ref 80–100)
MICROSCOPIC EXAMINATION: NORMAL
MONOCYTES ABSOLUTE: 2 K/UL (ref 0–1.3)
MONOCYTES RELATIVE PERCENT: 11 %
NEUTROPHILS ABSOLUTE: 13.6 K/UL (ref 1.7–7.7)
NEUTROPHILS RELATIVE PERCENT: 74 %
NITRITE, URINE: NEGATIVE
OVALOCYTES: ABNORMAL
PDW BLD-RTO: 22.1 % (ref 12.4–15.4)
PH UA: 7 (ref 5–8)
PLATELET # BLD: 572 K/UL (ref 135–450)
PLATELET SLIDE REVIEW: ABNORMAL
PMV BLD AUTO: 8.4 FL (ref 5–10.5)
POIKILOCYTES: ABNORMAL
POLYCHROMASIA: ABNORMAL
POTASSIUM REFLEX MAGNESIUM: 3.9 MMOL/L (ref 3.5–5.1)
PROTEIN UA: NEGATIVE MG/DL
RBC # BLD: 2.5 M/UL (ref 4.2–5.9)
RETICULOCYTE ABSOLUTE COUNT: 0.35 M/UL
RETICULOCYTE COUNT PCT: 14.19 % (ref 0.5–2.18)
SICKLE CELLS: ABNORMAL
SODIUM BLD-SCNC: 140 MMOL/L (ref 136–145)
SPECIFIC GRAVITY UA: 1.01 (ref 1–1.03)
SPECIMEN STATUS: NORMAL
TARGET CELLS: ABNORMAL
TOTAL CK: 101 U/L (ref 39–308)
TOTAL PROTEIN: 7.4 G/DL (ref 6.4–8.2)
TROPONIN: <0.01 NG/ML
URINE TYPE: NORMAL
UROBILINOGEN, URINE: 0.2 E.U./DL
WBC # BLD: 18.4 K/UL (ref 4–11)

## 2021-05-03 PROCEDURE — 99284 EMERGENCY DEPT VISIT MOD MDM: CPT

## 2021-05-03 PROCEDURE — 6360000002 HC RX W HCPCS: Performed by: INTERNAL MEDICINE

## 2021-05-03 PROCEDURE — 6370000000 HC RX 637 (ALT 250 FOR IP): Performed by: INTERNAL MEDICINE

## 2021-05-03 PROCEDURE — 6360000002 HC RX W HCPCS: Performed by: EMERGENCY MEDICINE

## 2021-05-03 PROCEDURE — 86900 BLOOD TYPING SEROLOGIC ABO: CPT

## 2021-05-03 PROCEDURE — 86902 BLOOD TYPE ANTIGEN DONOR EA: CPT

## 2021-05-03 PROCEDURE — 6360000002 HC RX W HCPCS: Performed by: FAMILY MEDICINE

## 2021-05-03 PROCEDURE — 82550 ASSAY OF CK (CPK): CPT

## 2021-05-03 PROCEDURE — 84484 ASSAY OF TROPONIN QUANT: CPT

## 2021-05-03 PROCEDURE — 1200000000 HC SEMI PRIVATE

## 2021-05-03 PROCEDURE — 86901 BLOOD TYPING SEROLOGIC RH(D): CPT

## 2021-05-03 PROCEDURE — 85660 RBC SICKLE CELL TEST: CPT

## 2021-05-03 PROCEDURE — 2580000003 HC RX 258: Performed by: EMERGENCY MEDICINE

## 2021-05-03 PROCEDURE — 93010 ELECTROCARDIOGRAM REPORT: CPT | Performed by: INTERNAL MEDICINE

## 2021-05-03 PROCEDURE — 2580000003 HC RX 258: Performed by: INTERNAL MEDICINE

## 2021-05-03 PROCEDURE — 85045 AUTOMATED RETICULOCYTE COUNT: CPT

## 2021-05-03 PROCEDURE — 71045 X-RAY EXAM CHEST 1 VIEW: CPT

## 2021-05-03 PROCEDURE — P9016 RBC LEUKOCYTES REDUCED: HCPCS

## 2021-05-03 PROCEDURE — 86923 COMPATIBILITY TEST ELECTRIC: CPT

## 2021-05-03 PROCEDURE — 96376 TX/PRO/DX INJ SAME DRUG ADON: CPT

## 2021-05-03 PROCEDURE — 96374 THER/PROPH/DIAG INJ IV PUSH: CPT

## 2021-05-03 PROCEDURE — 81003 URINALYSIS AUTO W/O SCOPE: CPT

## 2021-05-03 PROCEDURE — 93005 ELECTROCARDIOGRAM TRACING: CPT | Performed by: EMERGENCY MEDICINE

## 2021-05-03 PROCEDURE — 96375 TX/PRO/DX INJ NEW DRUG ADDON: CPT

## 2021-05-03 PROCEDURE — 80053 COMPREHEN METABOLIC PANEL: CPT

## 2021-05-03 PROCEDURE — 85025 COMPLETE CBC W/AUTO DIFF WBC: CPT

## 2021-05-03 PROCEDURE — 86850 RBC ANTIBODY SCREEN: CPT

## 2021-05-03 RX ORDER — ACETAMINOPHEN 650 MG/1
650 SUPPOSITORY RECTAL EVERY 6 HOURS PRN
Status: DISCONTINUED | OUTPATIENT
Start: 2021-05-03 | End: 2021-05-05 | Stop reason: HOSPADM

## 2021-05-03 RX ORDER — HYDROMORPHONE HCL 110MG/55ML
1 PATIENT CONTROLLED ANALGESIA SYRINGE INTRAVENOUS EVERY 4 HOURS PRN
Status: DISCONTINUED | OUTPATIENT
Start: 2021-05-03 | End: 2021-05-03

## 2021-05-03 RX ORDER — M-VIT,TX,IRON,MINS/CALC/FOLIC 27MG-0.4MG
1 TABLET ORAL
Status: DISCONTINUED | OUTPATIENT
Start: 2021-05-03 | End: 2021-05-05 | Stop reason: HOSPADM

## 2021-05-03 RX ORDER — MAGNESIUM SULFATE 1 G/100ML
1000 INJECTION INTRAVENOUS PRN
Status: DISCONTINUED | OUTPATIENT
Start: 2021-05-03 | End: 2021-05-05 | Stop reason: HOSPADM

## 2021-05-03 RX ORDER — SODIUM CHLORIDE 9 MG/ML
1000 INJECTION, SOLUTION INTRAVENOUS CONTINUOUS
Status: DISCONTINUED | OUTPATIENT
Start: 2021-05-03 | End: 2021-05-04

## 2021-05-03 RX ORDER — 0.9 % SODIUM CHLORIDE 0.9 %
1000 INTRAVENOUS SOLUTION INTRAVENOUS ONCE
Status: COMPLETED | OUTPATIENT
Start: 2021-05-03 | End: 2021-05-03

## 2021-05-03 RX ORDER — ONDANSETRON 2 MG/ML
4 INJECTION INTRAMUSCULAR; INTRAVENOUS EVERY 6 HOURS PRN
Status: DISCONTINUED | OUTPATIENT
Start: 2021-05-03 | End: 2021-05-05 | Stop reason: HOSPADM

## 2021-05-03 RX ORDER — PROMETHAZINE HYDROCHLORIDE 25 MG/1
12.5 TABLET ORAL EVERY 6 HOURS PRN
Status: DISCONTINUED | OUTPATIENT
Start: 2021-05-03 | End: 2021-05-05 | Stop reason: HOSPADM

## 2021-05-03 RX ORDER — FOLIC ACID 1 MG/1
1 TABLET ORAL
Status: DISCONTINUED | OUTPATIENT
Start: 2021-05-03 | End: 2021-05-05 | Stop reason: HOSPADM

## 2021-05-03 RX ORDER — HYDROXYUREA 500 MG/1
1000 CAPSULE ORAL 2 TIMES DAILY
Status: DISCONTINUED | OUTPATIENT
Start: 2021-05-03 | End: 2021-05-05 | Stop reason: HOSPADM

## 2021-05-03 RX ORDER — SODIUM CHLORIDE 0.9 % (FLUSH) 0.9 %
10 SYRINGE (ML) INJECTION PRN
Status: DISCONTINUED | OUTPATIENT
Start: 2021-05-03 | End: 2021-05-05 | Stop reason: HOSPADM

## 2021-05-03 RX ORDER — POTASSIUM CHLORIDE 7.45 MG/ML
10 INJECTION INTRAVENOUS PRN
Status: DISCONTINUED | OUTPATIENT
Start: 2021-05-03 | End: 2021-05-05 | Stop reason: HOSPADM

## 2021-05-03 RX ORDER — OXYCODONE HCL 20 MG/1
20 TABLET, FILM COATED, EXTENDED RELEASE ORAL EVERY 12 HOURS SCHEDULED
Status: DISCONTINUED | OUTPATIENT
Start: 2021-05-03 | End: 2021-05-05 | Stop reason: HOSPADM

## 2021-05-03 RX ORDER — OXYCODONE HYDROCHLORIDE 5 MG/1
10 TABLET ORAL EVERY 4 HOURS PRN
Status: DISCONTINUED | OUTPATIENT
Start: 2021-05-03 | End: 2021-05-05 | Stop reason: HOSPADM

## 2021-05-03 RX ORDER — SODIUM CHLORIDE 9 MG/ML
25 INJECTION, SOLUTION INTRAVENOUS PRN
Status: DISCONTINUED | OUTPATIENT
Start: 2021-05-03 | End: 2021-05-05 | Stop reason: HOSPADM

## 2021-05-03 RX ORDER — ONDANSETRON 2 MG/ML
4 INJECTION INTRAMUSCULAR; INTRAVENOUS ONCE
Status: COMPLETED | OUTPATIENT
Start: 2021-05-03 | End: 2021-05-03

## 2021-05-03 RX ORDER — POTASSIUM CHLORIDE 20 MEQ/1
40 TABLET, EXTENDED RELEASE ORAL PRN
Status: DISCONTINUED | OUTPATIENT
Start: 2021-05-03 | End: 2021-05-05 | Stop reason: HOSPADM

## 2021-05-03 RX ORDER — ACETAMINOPHEN 325 MG/1
650 TABLET ORAL EVERY 6 HOURS PRN
Status: DISCONTINUED | OUTPATIENT
Start: 2021-05-03 | End: 2021-05-05 | Stop reason: HOSPADM

## 2021-05-03 RX ORDER — LANOLIN ALCOHOL/MO/W.PET/CERES
3 CREAM (GRAM) TOPICAL NIGHTLY PRN
Status: DISCONTINUED | OUTPATIENT
Start: 2021-05-03 | End: 2021-05-05 | Stop reason: HOSPADM

## 2021-05-03 RX ORDER — HYDROMORPHONE HCL 110MG/55ML
1 PATIENT CONTROLLED ANALGESIA SYRINGE INTRAVENOUS
Status: DISCONTINUED | OUTPATIENT
Start: 2021-05-03 | End: 2021-05-05 | Stop reason: HOSPADM

## 2021-05-03 RX ADMIN — MULTIPLE VITAMINS W/ MINERALS TAB 1 TABLET: TAB at 09:22

## 2021-05-03 RX ADMIN — FOLIC ACID 1 MG: 1 TABLET ORAL at 09:22

## 2021-05-03 RX ADMIN — HYDROMORPHONE HYDROCHLORIDE 1 MG: 2 INJECTION, SOLUTION INTRAMUSCULAR; INTRAVENOUS; SUBCUTANEOUS at 21:32

## 2021-05-03 RX ADMIN — HYDROMORPHONE HYDROCHLORIDE 1 MG: 2 INJECTION, SOLUTION INTRAMUSCULAR; INTRAVENOUS; SUBCUTANEOUS at 18:34

## 2021-05-03 RX ADMIN — HYDROMORPHONE HYDROCHLORIDE 1 MG: 2 INJECTION, SOLUTION INTRAMUSCULAR; INTRAVENOUS; SUBCUTANEOUS at 15:30

## 2021-05-03 RX ADMIN — OXYCODONE HYDROCHLORIDE 10 MG: 5 TABLET ORAL at 22:23

## 2021-05-03 RX ADMIN — HYDROMORPHONE HYDROCHLORIDE 1 MG: 2 INJECTION, SOLUTION INTRAMUSCULAR; INTRAVENOUS; SUBCUTANEOUS at 12:25

## 2021-05-03 RX ADMIN — OXYCODONE HYDROCHLORIDE 10 MG: 5 TABLET ORAL at 11:21

## 2021-05-03 RX ADMIN — HYDROMORPHONE HYDROCHLORIDE 1 MG: 1 INJECTION, SOLUTION INTRAMUSCULAR; INTRAVENOUS; SUBCUTANEOUS at 01:25

## 2021-05-03 RX ADMIN — SODIUM CHLORIDE 1000 ML: 9 INJECTION, SOLUTION INTRAVENOUS at 18:34

## 2021-05-03 RX ADMIN — SODIUM CHLORIDE 1000 ML: 9 INJECTION, SOLUTION INTRAVENOUS at 03:25

## 2021-05-03 RX ADMIN — HYDROXYUREA 1000 MG: 500 CAPSULE ORAL at 11:32

## 2021-05-03 RX ADMIN — SODIUM CHLORIDE 1000 ML: 9 INJECTION, SOLUTION INTRAVENOUS at 05:08

## 2021-05-03 RX ADMIN — HYDROXYUREA 1000 MG: 500 CAPSULE ORAL at 20:23

## 2021-05-03 RX ADMIN — SODIUM CHLORIDE 1000 ML: 9 INJECTION, SOLUTION INTRAVENOUS at 14:32

## 2021-05-03 RX ADMIN — SODIUM CHLORIDE 1000 ML: 9 INJECTION, SOLUTION INTRAVENOUS at 01:31

## 2021-05-03 RX ADMIN — OXYCODONE HYDROCHLORIDE 10 MG: 5 TABLET ORAL at 05:07

## 2021-05-03 RX ADMIN — ONDANSETRON 4 MG: 2 INJECTION INTRAMUSCULAR; INTRAVENOUS at 01:25

## 2021-05-03 RX ADMIN — HYDROMORPHONE HYDROCHLORIDE 1 MG: 1 INJECTION, SOLUTION INTRAMUSCULAR; INTRAVENOUS; SUBCUTANEOUS at 03:25

## 2021-05-03 RX ADMIN — HYDROMORPHONE HYDROCHLORIDE 1 MG: 2 INJECTION, SOLUTION INTRAMUSCULAR; INTRAVENOUS; SUBCUTANEOUS at 07:32

## 2021-05-03 RX ADMIN — OXYCODONE HYDROCHLORIDE 20 MG: 20 TABLET, FILM COATED, EXTENDED RELEASE ORAL at 09:22

## 2021-05-03 RX ADMIN — OXYCODONE HYDROCHLORIDE 10 MG: 5 TABLET ORAL at 16:33

## 2021-05-03 RX ADMIN — Medication 10 ML: at 05:12

## 2021-05-03 RX ADMIN — OXYCODONE HYDROCHLORIDE 20 MG: 20 TABLET, FILM COATED, EXTENDED RELEASE ORAL at 20:23

## 2021-05-03 ASSESSMENT — PAIN DESCRIPTION - PAIN TYPE: TYPE: ACUTE PAIN

## 2021-05-03 ASSESSMENT — PAIN SCALES - GENERAL
PAINLEVEL_OUTOF10: 8
PAINLEVEL_OUTOF10: 10
PAINLEVEL_OUTOF10: 8
PAINLEVEL_OUTOF10: 7
PAINLEVEL_OUTOF10: 8
PAINLEVEL_OUTOF10: 7
PAINLEVEL_OUTOF10: 9
PAINLEVEL_OUTOF10: 10
PAINLEVEL_OUTOF10: 9
PAINLEVEL_OUTOF10: 9

## 2021-05-03 ASSESSMENT — PAIN DESCRIPTION - FREQUENCY: FREQUENCY: CONTINUOUS

## 2021-05-03 NOTE — H&P
Hospital Medicine History & Physical      PCP: Sera Louis MD    Date of Admission: 5/3/2021    Date of Service: Pt seen/examined on 5/3/21 and Admitted to Inpatient with expected LOS greater than two midnights due to medical therapy. Chief Complaint:  R shoulder and R hip pain    History Of Present Illness:    23 y.o. male who presented to [facility] with R shoulder and R hip pain yesterday at 6pm. H/O sickle cell disease. Tried to take home meds without relief. Currently 8/10 - sharp and aching. No radiation. Dilaudid is helping some. Movement makes the pain worse. Denies any recent or current illness. Occasionally has some chills. Poor appetite today. Follows with Dr. Jere King. 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 Authorizing Provider   oxyCODONE (OXYCONTIN) 20 MG extended release tablet TAKE 1 TABLET BY MOUTH EVERY 12 HOURS. TAKE WITH ENOUGH WATER TO SWALLOW WHOLE. DO NOT CRUSH/DISSOLVE/CHEW/CUT/BREAK. 4/12/21   Historical Provider, MD   oxyCODONE 5 MG capsule Take 10 mg by mouth every 4 hours as needed for Pain. Historical Provider, MD   hydroxyurea (HYDREA) 500 MG chemo capsule Take 2 capsules by mouth 2 times daily 12/9/20   Nciole Cox MD       Allergies:  Morphine    Social History:      The patient currently lives parents. TOBACCO:   reports that he has never smoked. He has never used smokeless tobacco.  ETOH:   reports previous alcohol use.   E-Cigarettes/Vaping Use     Questions Responses    E-Cigarette/Vaping Use Never User    Start Date     Passive Exposure     Quit Date     Counseling Given     Comments             Family History:      Reviewed in detail and negative for DM, CAD, Cancer, CVA. No family history on file. REVIEW OF SYSTEMS COMPLETED:   Pertinent positives as noted in the HPI. All other systems reviewed and negative. PHYSICAL EXAM PERFORMED:    BP (!) 111/59   Pulse 60   Temp 98 °F (36.7 °C) (Oral)   Resp 18   Ht 5' 8\" (1.727 m)   Wt 192 lb (87.1 kg)   SpO2 95%   BMI 29.19 kg/m²     General appearance:  No apparent distress, appears stated age and cooperative. HEENT:  Normal cephalic, atraumatic without obvious deformity. Pupils equal, round, and reactive to light. Extra ocular muscles intact. Conjunctivae/corneas clear. Neck: Supple, with full range of motion. No jugular venous distention. Trachea midline. Respiratory:  Normal respiratory effort. Clear to auscultation, bilaterally without Rales/Wheezes/Rhonchi. Cardiovascular:  Regular rate and rhythm with normal S1/S2 without murmurs, rubs or gallops. Abdomen: Soft, non-tender, non-distended with normal bowel sounds. Musculoskeletal:  No clubbing, cyanosis or edema bilaterally. Full range of motion without deformity. Skin: Skin color, texture, turgor normal.  No rashes or lesions. Neurologic:  Neurovascularly intact without any focal sensory/motor deficits. Cranial nerves: II-XII intact, grossly non-focal.  Psychiatric:  Alert and oriented, thought content appropriate, normal insight  Capillary Refill: Brisk,3 seconds, normal  Peripheral Pulses: +2 palpable, equal bilaterally       Labs:     Recent Labs     05/03/21 0126   WBC 18.4*   HGB 8.8*   HCT 25.6*   *     Recent Labs     05/03/21  0126      K 3.9      CO2 25   BUN 7   CREATININE 0.8*   CALCIUM 9.3     Recent Labs     05/03/21  0126   AST 27   ALT 19   BILITOT 4.1*   ALKPHOS 84     No results for input(s): INR in the last 72 hours.   Recent Labs     05/03/21  0126   CKTOTAL 101   TROPONINI <0.01       Urinalysis:      Lab Results   Component Value Date    NITRU Negative 05/03/2021    WBCUA None seen 01/05/2021    BACTERIA 2+ 12/08/2020    RBCUA 0-2 01/05/2021    BLOODU Negative 05/03/2021    SPECGRAV 1.010 05/03/2021    GLUCOSEU Negative 05/03/2021       Radiology:     CXR: I have reviewed the CXR with the following interpretation: neg  EKG:  I have reviewed the EKG with the following interpretation: NSR, LVH    XR CHEST PORTABLE   Final Result   No acute process. ASSESSMENT:    Active Hospital Problems    Diagnosis Date Noted    Chronic prescription opiate use [Z79.891] 01/05/2021    Sickle cell pain crisis ([facility] [address] [D57.00] 07/25/2020    Asthma [J45.909] 07/25/2020       PLAN:    Sickle Cell Crisis - start on IVF, pain medication, oxygen. Continue home folic acid, hydrea. Hbg 8.8 which is baseline for patient. Heme/Onc consult. CXR normal.    Asthma - controlled. DVT Prophylaxis: lovenox  Diet: DIET GENERAL;  Code Status: Full Code    PT/OT Eval Status: baseline    Dispo - 2-4 days       Parish Calvin MD    Thank you Bay Parikh MD for the opportunity to be involved in this patient's care. If you have any questions or concerns please feel free to contact me at [phone].

## 2021-05-03 NOTE — ED PROVIDER NOTES
CHIEF COMPLAINT  Arm Pain (states he is having a sickle cell crisis, states that IV dilaudid works)      Karina0 Sean Pierce is a 23 y.o. male with a history of sickle cell disease, asthma who presents to the ED complaining of right chest pain and right arm pain. Patient describes symptom onset at 8 AM.  Last took 10 mg of oxycodone at 6 PM without adequate relief of his symptoms. He denies dyspnea, cough, fever, nausea, vomiting, abdominal pain. No recent trauma. No other complaints, modifying factors or associated symptoms. I have reviewed the following from the nursing documentation. 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   Social History     Socioeconomic History    Marital status: Single     Spouse name: Not on file    Number of children: 0    Years of education: Not on file    Highest education level: Not on file   Occupational History    Not on file   Social Needs    Financial resource strain: Not on file    Food insecurity     Worry: Not on file     Inability: Not on file    Transportation needs     Medical: Not on file     Non-medical: Not on file   Tobacco Use    Smoking status: Never Smoker    Smokeless tobacco: Never Used   Substance and Sexual Activity    Alcohol use: Not Currently    Drug use: Never    Sexual activity: Not Currently   Lifestyle    Physical activity     Days per week: Not on file     Minutes per session: Not on file    Stress: Not on file   Relationships    Social connections     Talks on phone: Not on file     Gets together: Not on file     Attends [employer] service: Not on file     Active member of club or organization: Not on file     Attends meetings of clubs or organizations: Not on file Relationship status: Not on file    Intimate partner violence     Fear of current or ex partner: Not on file     Emotionally abused: Not on file     Physically abused: Not on file     Forced sexual activity: Not on file   Other Topics Concern    Not on file   Social History Narrative    Not on file     Current Facility-Administered Medications   Medication Dose Route Frequency Provider Last Rate Last Admin    0.9 % sodium chloride infusion  1,000 mL Intravenous Continuous Shelby Carmona  mL/hr at 05/03/21 0508 1,000 mL at 05/03/21 0508    hydroxyurea (HYDREA) chemo capsule 1,000 mg  1,000 mg Oral BID Shelby Carmona MD        oxyCODONE (OXYCONTIN) extended release tablet 20 mg  20 mg Oral 2 times per day Shelby Carmona MD        oxyCODONE (ROXICODONE) immediate release tablet 10 mg  10 mg Oral Q4H PRN Shelby Carmona MD   10 mg at 05/03/21 0507    sodium chloride flush 0.9 % injection 10 mL  10 mL Intravenous PRN Shelby Carmona MD   10 mL at 05/03/21 0512    0.9 % sodium chloride infusion  25 mL Intravenous PRN Shelby Carmona MD        potassium chloride (KLOR-CON M) extended release tablet 40 mEq  40 mEq Oral PRN Shelby Carmona MD        Or    potassium bicarb-citric acid (EFFER-K) effervescent tablet 40 mEq  40 mEq Oral PRN Shelby Carmona MD        Or    potassium chloride 10 mEq/100 mL IVPB (Peripheral Line)  10 mEq Intravenous PRN Shelby Carmona MD        magnesium sulfate 1000 mg in dextrose 5% 100 mL IVPB  1,000 mg Intravenous PRN Shelby Carmona MD        promethazine (PHENERGAN) tablet 12.5 mg  12.5 mg Oral Q6H PRN Shelby Carmona MD        Or    [facility] injection 4 mg  4 mg Intravenous Q6H PRN Shelby Carmona MD        acetaminophen (TYLENOL) tablet 650 mg  650 mg Oral Q6H PRN Shelby Carmona MD        Or    acetaminophen (TYLENOL) suppository 650 mg  650 mg Rectal Q6H PRN Shelby Carmona MD        enoxaparin (LOVENOX) injection 40 mg 40 mg Subcutaneous Daily Sangeeta Mann MD        melatonin tablet 3 mg  3 mg Oral Nightly PRN Sangeeta Mann MD        HYDROmorphone (DILAUDID) injection 1 mg  1 mg Intravenous Q4H PRN Sangeeta Mann MD   1 mg at 11/87/22 9284    folic acid (FOLVITE) tablet 1 mg  1 mg Oral Daily with breakfast Sangeeta Mann MD        therapeutic multivitamin-minerals 1 tablet  1 tablet Oral Daily with breakfast Sangeeta Mann MD         Allergies   Allergen Reactions    Morphine Shortness Of Breath     Other reaction(s): Histamine-like Reaction  Has asthma exacerbation with morphine-histamine type reaction         REVIEW OF SYSTEMS  10 systems reviewed, pertinent positives per HPI otherwise noted to be negative. PHYSICAL EXAM  /69   Pulse 64   Temp 98.3 °F (36.8 °C) (Oral)   Resp 16   Ht 5' 8\" (1.727 m)   Wt 192 lb (87.1 kg)   SpO2 94%   BMI 29.19 kg/m²   GENERAL APPEARANCE: Awake and alert. Cooperative. Appears in pain but not acutely toxic. HEAD: Normocephalic. Atraumatic. EYES: PERRL. EOM's grossly intact. ENT: Mucous membranes are moist.   NECK: Supple, trachea midline. HEART: RRR. Normal S1, S2. No murmurs, rubs or gallops. LUNGS: Respirations unlabored. CTAB. Good air exchange. No wheezes, rales, or rhonchi. Speaking comfortably in full sentences. ABDOMEN: Soft. Non-distended. Non-tender. No guarding or rebound. Normal Bowel sounds. EXTREMITIES: No peripheral edema. MAEE. No acute deformities. SKIN: Warm and dry. No acute rashes. NEUROLOGICAL: Alert and oriented X 3. CN II-XII intact. No gross facial drooping. Strength 5/5, sensation intact. No pronator drift. Normal coordination. PSYCHIATRIC: Normal mood and affect. LABS  I have reviewed all labs for this visit.    Results for orders placed or performed during the hospital encounter of 05/03/21   CBC Auto Differential   Result Value Ref Range    WBC 18.4 (H) 4.0 - 11.0 K/uL    RBC 2.50 (L) 4.20 - 5.90 M/uL Hemoglobin 8.8 (L) 13.5 - 17.5 g/dL    Hematocrit 25.6 (L) 40.5 - 52.5 %    .6 (H) 80.0 - 100.0 fL    MCH 35.2 (H) 26.0 - 34.0 pg    MCHC 34.3 31.0 - 36.0 g/dL    RDW 22.1 (H) 12.4 - 15.4 %    Platelets 872 (H) 785 - 450 K/uL    MPV 8.4 5.0 - 10.5 fL    PLATELET SLIDE REVIEW Increased     Path Consult No     Neutrophils % 74.0 %    Lymphocytes % 14.0 %    Monocytes % 11.0 %    Eosinophils % 1.0 %    Basophils % 0.0 %    Neutrophils Absolute 13.6 (H) 1.7 - 7.7 K/uL    Lymphocytes Absolute 2.6 1.0 - 5.1 K/uL    Monocytes Absolute 2.0 (H) 0.0 - 1.3 K/uL    Eosinophils Absolute 0.2 0.0 - 0.6 K/uL    Basophils Absolute 0.0 0.0 - 0.2 K/uL    Anisocytosis 2+ (A)     Macrocytes 2+ (A)     Polychromasia Occasional (A)     Poikilocytes 3+ (A)     Ovalocytes 1+ (A)     Target Cells Occasional (A)     Sickle Cells Occasional (A)    Comprehensive Metabolic Panel w/ Reflex to MG   Result Value Ref Range    Sodium 140 136 - 145 mmol/L    Potassium reflex Magnesium 3.9 3.5 - 5.1 mmol/L    Chloride 107 99 - 110 mmol/L    CO2 25 21 - 32 mmol/L    Anion Gap 8 3 - 16    Glucose 105 (H) 70 - 99 mg/dL    BUN 7 7 - 20 mg/dL    CREATININE 0.8 (L) 0.9 - 1.3 mg/dL    GFR Non-African American >60 >60    GFR African American >60 >60    Calcium 9.3 8.3 - 10.6 mg/dL    Total Protein 7.4 6.4 - 8.2 g/dL    Albumin 4.4 3.4 - 5.0 g/dL    Albumin/Globulin Ratio 1.5 1.1 - 2.2    Total Bilirubin 4.1 (H) 0.0 - 1.0 mg/dL    Alkaline Phosphatase 84 40 - 129 U/L    ALT 19 10 - 40 U/L    AST 27 15 - 37 U/L    Globulin 3.0 g/dL   Reticulocytes   Result Value Ref Range    Retic Ct Pct 14.19 (H) 0.50 - 2.18 %    Retic Ct Abs 0.355 M/uL    Immature Retic Fract 0.65 (H) 0.21 - 0.37   Sample possible blood bank testing   Result Value Ref Range    Specimen Status GIANLUCA    Urinalysis, reflex to microscopic   Result Value Ref Range    Color, UA Yellow Straw/Yellow    Clarity, UA Clear Clear    Glucose, Ur Negative Negative mg/dL    Bilirubin Urine Negative Negative    Ketones, Urine Negative Negative mg/dL    Specific Gravity, UA 1.010 1.005 - 1.030    Blood, Urine Negative Negative    pH, UA 7.0 5.0 - 8.0    Protein, UA Negative Negative mg/dL    Urobilinogen, Urine 0.2 <2.0 E.U./dL    Nitrite, Urine Negative Negative    Leukocyte Esterase, Urine Negative Negative    Microscopic Examination Not Indicated     Urine Type NotGiven    Troponin   Result Value Ref Range    Troponin <0.01 <0.01 ng/mL   CK   Result Value Ref Range    Total  39 - 308 U/L   EKG 12 Lead   Result Value Ref Range    Ventricular Rate 70 BPM    Atrial Rate 70 BPM    P-R Interval 144 ms    QRS Duration 94 ms    Q-T Interval 386 ms    QTc Calculation (Bazett) 416 ms    P Axis 6 degrees    R Axis 71 degrees    T Axis 29 degrees    Diagnosis       Normal sinus rhythmMinimal voltage criteria for LVH, may be normal variantBorderline ECGWhen compared with ECG of 04-DEC-2020 09:28,Non-specific change in ST segment in Lateral leadsT wave inversion no longer evident in Inferior leadsT wave inversion no longer evident in Lateral leads       EKG  Normal sinus rhythm, rate 70, normal axis, QTC within normal limits, no acute ST changes or T wave inversions, resolution of previously observed T wave inversions on prior from 12/4/2020      RADIOLOGY  X-RAYS:  I have reviewed radiologic plain film image(s). ALL OTHER NON-PLAIN FILM IMAGES SUCH AS CT, ULTRASOUND AND MRI HAVE BEEN READ BY THE RADIOLOGIST. XR CHEST PORTABLE   Final Result   No acute process. Rechecks: Physical assessment performed. Reports some improvement after the second dose of Dilaudid but states he is still in pain. ED COURSE/MDM  Patient seen and evaluated. Old records reviewed. Labs and imaging reviewed and results discussed with patient. Patient with sickle cell pain crisis. Symptoms not adequately resolved after IV fluids and 2 rounds of intravenous Dilaudid. Admitted to the hospitalist service.     Current Discharge Medication List          CLINICAL IMPRESSION  1. Sickle cell pain crisis (HCC)        Blood pressure 113/69, pulse 64, temperature 98.3 °F (36.8 °C), temperature source Oral, resp. rate 16, height 5' 8\" (1.727 m), weight 192 lb (87.1 kg), SpO2 94 %. 12 [facility] was admitted in stable condition.         Hanna Armas MD  05/03/21 6107

## 2021-05-03 NOTE — ED NOTES
Bed: 14  Expected date: 5/3/21  Expected time: 12:55 AM  Means of arrival:   Comments:  1950 Ni Parra RN  05/03/21 4536

## 2021-05-03 NOTE — CARE COORDINATION
CASE MANAGEMENT INITIAL ASSESSMENT      Reviewed chart and completed assessment with:patient  Explained Case Management role/services. Primary contact information:[facility] [address] :   Primary Decision Maker: Lisset Zhou - Parent, Legal Guardian - [phone]    Secondary Decision Maker: Jigar Rosenberg - Parent - [phone]          Can this person be reached and be able to respond quickly, such as within a few minutes or hours? Yes  Who would be your back-up decision maker? Name Sukhwinder cabrera      Admit date/status:5/3/21  Diagnosis:sickle cell    Is this a Readmission?:  No      Insurance:aetna   Precert required for SNF: Yes       3 night stay required: No    Living arrangements, Adls, care needs, prior to admission:lives in home with mother. IPTA    Transportation:private     Durable Medical Equipment at home:  Walker__Cane__RTS__ BSC__Shower Chair__  02__ HHN__ CPAP__  BiPap__  Hospital Bed__ W/C___ Other__________    Services in the home and/or outpatient, prior to [address] (if applicable)   · Name:  · Address:  · Dialysis Schedule:  · Phone:  · Fax:    PT/OT recs:none    Hospital Exemption Notification (HEN):needed for SNF    Barriers to discharge:none    Plan/comments:spoke with patient. Plans on returning home at discharge. Reported IPTA denied any DCP needs.      ECOC on chart for MD signature

## 2021-05-03 NOTE — PROGRESS NOTES
Pt admitted to c3 from ED. Oriented to room, call light and POC. Pt denies needs outside of food and pain coverage- medicated with PO pain meds and food provided. Call light and bedside table within reach, will continue to monitor.

## 2021-05-03 NOTE — PLAN OF CARE
Pt refuses four eye skin check upon admission but denies any open or non-healing wounds. Call light within reach, will continue to monitor.

## 2021-05-04 LAB
A/G RATIO: 1.6 (ref 1.1–2.2)
ABO/RH: NORMAL
ALBUMIN SERPL-MCNC: 3.9 G/DL (ref 3.4–5)
ALP BLD-CCNC: 73 U/L (ref 40–129)
ALT SERPL-CCNC: 15 U/L (ref 10–40)
ANION GAP SERPL CALCULATED.3IONS-SCNC: 6 MMOL/L (ref 3–16)
ANTIBODY SCREEN: NORMAL
AST SERPL-CCNC: 20 U/L (ref 15–37)
BASOPHILS ABSOLUTE: 0 K/UL (ref 0–0.2)
BASOPHILS RELATIVE PERCENT: 0 %
BILIRUB SERPL-MCNC: 3.7 MG/DL (ref 0–1)
BLOOD BANK DISPENSE STATUS: NORMAL
BLOOD BANK PRODUCT CODE: NORMAL
BPU ID: NORMAL
BUN BLDV-MCNC: 6 MG/DL (ref 7–20)
CALCIUM SERPL-MCNC: 8.7 MG/DL (ref 8.3–10.6)
CHLORIDE BLD-SCNC: 110 MMOL/L (ref 99–110)
CO2: 27 MMOL/L (ref 21–32)
CREAT SERPL-MCNC: 0.8 MG/DL (ref 0.9–1.3)
DESCRIPTION BLOOD BANK: NORMAL
EOSINOPHILS ABSOLUTE: 0.2 K/UL (ref 0–0.6)
EOSINOPHILS RELATIVE PERCENT: 1 %
GFR AFRICAN AMERICAN: >60
GFR NON-AFRICAN AMERICAN: >60
GLOBULIN: 2.4 G/DL
GLUCOSE BLD-MCNC: 85 MG/DL (ref 70–99)
HCT VFR BLD CALC: 23.2 % (ref 40.5–52.5)
HEMOGLOBIN: 8.1 G/DL (ref 13.5–17.5)
HYPOCHROMIA: ABNORMAL
LYMPHOCYTES ABSOLUTE: 4.7 K/UL (ref 1–5.1)
LYMPHOCYTES RELATIVE PERCENT: 31 %
MACROCYTES: ABNORMAL
MAGNESIUM: 1.8 MG/DL (ref 1.8–2.4)
MCH RBC QN AUTO: 35 PG (ref 26–34)
MCHC RBC AUTO-ENTMCNC: 34.8 G/DL (ref 31–36)
MCV RBC AUTO: 100.6 FL (ref 80–100)
MONOCYTES ABSOLUTE: 1.5 K/UL (ref 0–1.3)
MONOCYTES RELATIVE PERCENT: 10 %
NEUTROPHILS ABSOLUTE: 8.7 K/UL (ref 1.7–7.7)
NEUTROPHILS RELATIVE PERCENT: 58 %
PDW BLD-RTO: 20.6 % (ref 12.4–15.4)
PLATELET # BLD: 491 K/UL (ref 135–450)
PLATELET SLIDE REVIEW: ABNORMAL
PMV BLD AUTO: 8.4 FL (ref 5–10.5)
POLYCHROMASIA: ABNORMAL
POTASSIUM SERPL-SCNC: 4.3 MMOL/L (ref 3.5–5.1)
RBC # BLD: 2.31 M/UL (ref 4.2–5.9)
SCHISTOCYTES: ABNORMAL
SICKLE CELLS: ABNORMAL
SLIDE REVIEW: ABNORMAL
SODIUM BLD-SCNC: 143 MMOL/L (ref 136–145)
TARGET CELLS: ABNORMAL
TOTAL PROTEIN: 6.3 G/DL (ref 6.4–8.2)
WBC # BLD: 15 K/UL (ref 4–11)

## 2021-05-04 PROCEDURE — 36430 TRANSFUSION BLD/BLD COMPNT: CPT

## 2021-05-04 PROCEDURE — 1200000000 HC SEMI PRIVATE

## 2021-05-04 PROCEDURE — 2580000003 HC RX 258: Performed by: INTERNAL MEDICINE

## 2021-05-04 PROCEDURE — 83735 ASSAY OF MAGNESIUM: CPT

## 2021-05-04 PROCEDURE — 6360000002 HC RX W HCPCS: Performed by: FAMILY MEDICINE

## 2021-05-04 PROCEDURE — 6370000000 HC RX 637 (ALT 250 FOR IP): Performed by: NURSE PRACTITIONER

## 2021-05-04 PROCEDURE — 6360000002 HC RX W HCPCS: Performed by: NURSE PRACTITIONER

## 2021-05-04 PROCEDURE — 80053 COMPREHEN METABOLIC PANEL: CPT

## 2021-05-04 PROCEDURE — P9016 RBC LEUKOCYTES REDUCED: HCPCS

## 2021-05-04 PROCEDURE — 2580000003 HC RX 258: Performed by: FAMILY MEDICINE

## 2021-05-04 PROCEDURE — 85025 COMPLETE CBC W/AUTO DIFF WBC: CPT

## 2021-05-04 PROCEDURE — 6370000000 HC RX 637 (ALT 250 FOR IP): Performed by: INTERNAL MEDICINE

## 2021-05-04 PROCEDURE — 36415 COLL VENOUS BLD VENIPUNCTURE: CPT

## 2021-05-04 RX ORDER — KETOROLAC TROMETHAMINE 30 MG/ML
30 INJECTION, SOLUTION INTRAMUSCULAR; INTRAVENOUS 3 TIMES DAILY
Status: DISCONTINUED | OUTPATIENT
Start: 2021-05-04 | End: 2021-05-05 | Stop reason: HOSPADM

## 2021-05-04 RX ORDER — DIPHENHYDRAMINE HCL 25 MG
25 TABLET ORAL ONCE
Status: COMPLETED | OUTPATIENT
Start: 2021-05-04 | End: 2021-05-04

## 2021-05-04 RX ORDER — SODIUM CHLORIDE 9 MG/ML
INJECTION, SOLUTION INTRAVENOUS CONTINUOUS
Status: DISCONTINUED | OUTPATIENT
Start: 2021-05-04 | End: 2021-05-05 | Stop reason: HOSPADM

## 2021-05-04 RX ORDER — ACETAMINOPHEN 325 MG/1
650 TABLET ORAL ONCE
Status: COMPLETED | OUTPATIENT
Start: 2021-05-04 | End: 2021-05-04

## 2021-05-04 RX ORDER — SODIUM CHLORIDE 9 MG/ML
INJECTION, SOLUTION INTRAVENOUS PRN
Status: DISCONTINUED | OUTPATIENT
Start: 2021-05-04 | End: 2021-05-05 | Stop reason: HOSPADM

## 2021-05-04 RX ADMIN — HYDROMORPHONE HYDROCHLORIDE 1 MG: 2 INJECTION, SOLUTION INTRAMUSCULAR; INTRAVENOUS; SUBCUTANEOUS at 21:00

## 2021-05-04 RX ADMIN — SODIUM CHLORIDE 1000 ML: 9 INJECTION, SOLUTION INTRAVENOUS at 02:36

## 2021-05-04 RX ADMIN — OXYCODONE HYDROCHLORIDE 20 MG: 20 TABLET, FILM COATED, EXTENDED RELEASE ORAL at 20:10

## 2021-05-04 RX ADMIN — ACETAMINOPHEN 650 MG: 325 TABLET ORAL at 15:02

## 2021-05-04 RX ADMIN — HYDROMORPHONE HYDROCHLORIDE 1 MG: 2 INJECTION, SOLUTION INTRAMUSCULAR; INTRAVENOUS; SUBCUTANEOUS at 17:42

## 2021-05-04 RX ADMIN — KETOROLAC TROMETHAMINE 30 MG: 30 INJECTION, SOLUTION INTRAMUSCULAR; INTRAVENOUS at 12:59

## 2021-05-04 RX ADMIN — OXYCODONE HYDROCHLORIDE 10 MG: 5 TABLET ORAL at 14:04

## 2021-05-04 RX ADMIN — HYDROMORPHONE HYDROCHLORIDE 1 MG: 2 INJECTION, SOLUTION INTRAMUSCULAR; INTRAVENOUS; SUBCUTANEOUS at 12:59

## 2021-05-04 RX ADMIN — MULTIPLE VITAMINS W/ MINERALS TAB 1 TABLET: TAB at 08:27

## 2021-05-04 RX ADMIN — HYDROMORPHONE HYDROCHLORIDE 1 MG: 2 INJECTION, SOLUTION INTRAMUSCULAR; INTRAVENOUS; SUBCUTANEOUS at 00:31

## 2021-05-04 RX ADMIN — HYDROXYUREA 1000 MG: 500 CAPSULE ORAL at 10:30

## 2021-05-04 RX ADMIN — FOLIC ACID 1 MG: 1 TABLET ORAL at 08:27

## 2021-05-04 RX ADMIN — DIPHENHYDRAMINE HCL 25 MG: 25 TABLET ORAL at 15:02

## 2021-05-04 RX ADMIN — HYDROMORPHONE HYDROCHLORIDE 1 MG: 2 INJECTION, SOLUTION INTRAMUSCULAR; INTRAVENOUS; SUBCUTANEOUS at 06:32

## 2021-05-04 RX ADMIN — OXYCODONE HYDROCHLORIDE 10 MG: 5 TABLET ORAL at 02:36

## 2021-05-04 RX ADMIN — OXYCODONE HYDROCHLORIDE 20 MG: 20 TABLET, FILM COATED, EXTENDED RELEASE ORAL at 08:27

## 2021-05-04 RX ADMIN — HYDROMORPHONE HYDROCHLORIDE 1 MG: 2 INJECTION, SOLUTION INTRAMUSCULAR; INTRAVENOUS; SUBCUTANEOUS at 03:24

## 2021-05-04 RX ADMIN — SODIUM CHLORIDE: 9 INJECTION, SOLUTION INTRAVENOUS at 09:44

## 2021-05-04 RX ADMIN — HYDROXYUREA 1000 MG: 500 CAPSULE ORAL at 21:59

## 2021-05-04 RX ADMIN — HYDROMORPHONE HYDROCHLORIDE 1 MG: 2 INJECTION, SOLUTION INTRAMUSCULAR; INTRAVENOUS; SUBCUTANEOUS at 09:43

## 2021-05-04 RX ADMIN — KETOROLAC TROMETHAMINE 30 MG: 30 INJECTION, SOLUTION INTRAMUSCULAR; INTRAVENOUS at 20:10

## 2021-05-04 RX ADMIN — Medication 10 ML: at 20:10

## 2021-05-04 RX ADMIN — SODIUM CHLORIDE: 9 INJECTION, SOLUTION INTRAVENOUS at 17:44

## 2021-05-04 RX ADMIN — OXYCODONE HYDROCHLORIDE 10 MG: 5 TABLET ORAL at 22:03

## 2021-05-04 ASSESSMENT — PAIN SCALES - GENERAL
PAINLEVEL_OUTOF10: 7

## 2021-05-04 NOTE — PROGRESS NOTES
Shift assessment completed. Pt A&O x4, VSS. Pt appears to be resting comfortably in bed. Non skid socks on. Pt independent with ambulation. Scheduled pain medication given per MAR. Denies any needs at this time. Bed locked and in lowest position. Call light and bedside table within reach. Will continue to monitor.

## 2021-05-04 NOTE — PROGRESS NOTES
Physician Progress Note      PATIENT:               Katelyn Duque  CSN #:                  [id number]  :                       2000  ADMIT DATE:       5/3/2021 12:55 AM  DISCH DATE:  RESPONDING  PROVIDER #:        Vicente Hernandez MD          QUERY TEXT:    Pt admitted with sickle cell crisis and takes Oxycontin 20mg BID and Oxycodone   IR 10mg PRN at home which is continued in the hospital. Please document any   correlating medical diagnosis in the medical record: The medical record reflects the following:  Risk Factors: Sickle Cell  Clinical Indicators: Scheduled oxycontin, and oxycodone IR prn continued from   home dose. Requiring prn dilaudid q3 hours, Per H&P \"Chronic prescription   opiate use\"  Treatment: PRN dilaudid q3, oxycontin bid, oxy IR q4 PRN, Blood transfusion,   folate, hydrea, serial labs, supportive care, hematology consult.   Options provided:  -- Opioid dependence with chronic pain associated with sickle cell  -- Other - I will add my own diagnosis  -- Disagree - Not applicable / Not valid  -- Disagree - Clinically unable to determine / Unknown  -- Refer to Clinical Documentation Reviewer    PROVIDER RESPONSE TEXT:    This patient is opioid dependent with chronic pain associated with sickle cell    Query created by: Elvin Durán on 2021 3:31 PM      Electronically signed by:  Vicente Hernandez MD 2021 4:17 PM

## 2021-05-04 NOTE — PROGRESS NOTES
Hospitalist Progress Note      PCP: Maulik Dang MD    Date of Admission: 5/3/2021    Chief Complaint: R shoulder and R hip pain    Hospital Course: 23 y.o. male who presented to [facility] with R shoulder and R hip pain yesterday at 6pm. H/O sickle cell disease. Tried to take home meds without relief. Currently 8/10 - sharp and aching. No radiation. Dilaudid is helping some. Movement makes the pain worse. Denies any recent or current illness. Occasionally has some chills. Poor appetite today. Follows with Dr. Nadeem Sauceda.       Subjective: Notes he is feeling a little better today. Ate some. Had BM. Waiting on blood transfusion. Medications:  Reviewed    Infusion Medications    sodium chloride 125 mL/hr at 05/04/21 0944    sodium chloride      sodium chloride       Scheduled Medications    diphenhydrAMINE  25 mg Oral Once    acetaminophen  650 mg Oral Once    ketorolac  30 mg Intravenous TID    hydroxyurea  1,000 mg Oral BID    oxyCODONE  20 mg Oral 2 times per day    enoxaparin  40 mg Subcutaneous Daily    folic acid  1 mg Oral Daily with breakfast    multivitamin  1 tablet Oral Daily with breakfast     PRN Meds: sodium chloride, oxyCODONE, sodium chloride flush, sodium chloride, potassium chloride **OR** potassium alternative oral replacement **OR** potassium chloride, magnesium sulfate, promethazine **OR** ondansetron, acetaminophen **OR** acetaminophen, melatonin, HYDROmorphone      Intake/Output Summary (Last 24 hours) at 5/4/2021 1453  Last data filed at 5/4/2021 0944  Gross per 24 hour   Intake 3857.82 ml   Output 2050 ml   Net 1807.82 ml       Physical Exam Performed:    BP (!) 109/59   Pulse 72   Temp 97.8 °F (36.6 °C) (Oral)   Resp 18   Ht 5' 8\" (1.727 m)   Wt 194 lb 3.2 oz (88.1 kg)   SpO2 95%   BMI 29.53 kg/m²     General appearance: No apparent distress, appears stated age and cooperative. HEENT: Pupils equal, round, and reactive to light.  Conjunctivae/corneas clear.  Neck: Supple, with full range of motion. No jugular venous distention. Trachea midline. Respiratory:  Normal respiratory effort. Clear to auscultation, bilaterally without Rales/Wheezes/Rhonchi. Cardiovascular: Regular rate and rhythm with normal S1/S2 without murmurs, rubs or gallops. Abdomen: Soft, non-tender, non-distended with normal bowel sounds. Musculoskeletal: No clubbing, cyanosis or edema bilaterally. Full range of motion without deformity. Skin: Skin color, texture, turgor normal.  No rashes or lesions. Neurologic:  Neurovascularly intact without any focal sensory/motor deficits. Cranial nerves: II-XII intact, grossly non-focal.  Psychiatric: Alert and oriented, thought content appropriate, normal insight  Capillary Refill: Brisk,3 seconds, normal   Peripheral Pulses: +2 palpable, equal bilaterally       Labs:   Recent Labs     05/03/21 0126 05/04/21  0616   WBC 18.4* 15.0*   HGB 8.8* 8.1*   HCT 25.6* 23.2*   * 491*     Recent Labs     05/03/21 0126 05/04/21  0616    143   K 3.9 4.3    110   CO2 25 27   BUN 7 6*   CREATININE 0.8* 0.8*   CALCIUM 9.3 8.7     Recent Labs     05/03/21  0126 05/04/21  0616   AST 27 20   ALT 19 15   BILITOT 4.1* 3.7*   ALKPHOS 84 73     No results for input(s): INR in the last 72 hours. Recent Labs     05/03/21 0126   CKTOTAL 101   TROPONINI <0.01       Urinalysis:      Lab Results   Component Value Date    NITRU Negative 05/03/2021    WBCUA None seen 01/05/2021    BACTERIA 2+ 12/08/2020    RBCUA 0-2 01/05/2021    BLOODU Negative 05/03/2021    SPECGRAV 1.010 05/03/2021    GLUCOSEU Negative 05/03/2021       Radiology:  XR CHEST PORTABLE   Final Result   No acute process. Assessment/Plan:    Active Hospital Problems    Diagnosis    Chronic prescription opiate use [Z79.891]    Sickle cell pain crisis ([facility] [address] [D57.00]    Asthma [J45.909]     Sickle Cell Crisis - start on IVF, pain medication, oxygen.  Continue home folic acid, hydrea. Hbg 8.8 which is baseline for patient. Heme/Onc following. CXR normal.     Asthma - controlled.      DVT Prophylaxis: lovenox  Diet: DIET GENERAL;  Code Status: Full Code    PT/OT Eval Status: baseline    Dispo - 1-2 days    Marcos Fuller MD

## 2021-05-04 NOTE — PROGRESS NOTES
Pt a/o. Shift assessment complete and charted. Pt c/o pain- admin prn analgesics as ordered and requested. Pt ate 2 turkey sandwiches and denies n/v. BS active. Pt denies needs at this time. Bed locked and in lowest position. Pt stable when writer left room.

## 2021-05-04 NOTE — CONSULTS
Hematology/Oncology Consultation         Patient:   [facility]       Reason for Consult:  Sickle cell    Requesting Physician: No ref. provider found    Chief Complaint:     Chief Complaint   Patient presents with    Arm Pain     states he is having a sickle cell crisis, states that IV dilaudid works                  History Obtained from:     patient, electronic medical record    History of Present Illness:     [facility] is a 23 y.o. male  with significant past medical history of sickle cell anemia who presents with crisis like pain not amenable to outpatient management. Hgb is 8.1. He says his R hip pain is better today and now has R shoulder pain. NO headache or chest pain. He agrees to blood transfusion today. No fevers or signs of infection. O2 stable on RA at 95 percent.      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)     Sickle cell anemia (HCC)        Past Surgical History:         Procedure Laterality Date    SPLENECTOMY         Current Medications:     Current Facility-Administered Medications   Medication Dose Route Frequency Provider Last Rate Last Admin    0.9 % sodium chloride infusion   Intravenous Continuous Ilia Montenegro  mL/hr at 05/04/21 0944 New Bag at 05/04/21 0944    0.9 % sodium chloride infusion   Intravenous PRN Virgle Jesica, APRN - CNP        diphenhydrAMINE (BENADRYL) tablet 25 mg  25 mg Oral Once Virgle Jesica, APRN - CNP        acetaminophen (TYLENOL) tablet 650 mg  650 mg Oral Once Virgle Jesica, APRN - CNP        ketorolac (TORADOL) injection 30 mg  30 mg Intravenous TID Virgle Jesica, APRN - CNP        hydroxyurea (HYDREA) chemo capsule 1,000 mg  1,000 mg Oral BID Nika Dow MD   1,000 mg at 05/03/21 2023    oxyCODONE (OXYCONTIN) extended release tablet 20 mg  20 mg Oral 2 times per day Nika Dow MD   20 mg at 05/04/21 0827    oxyCODONE (ROXICODONE) immediate release tablet 10 mg  10 mg Oral Q4H PRN Angel Evans MD   10 mg at 05/04/21 0236    sodium chloride flush 0.9 % injection 10 mL  10 mL Intravenous PRN Angel Evans MD   10 mL at 05/03/21 0512    0.9 % sodium chloride infusion  25 mL Intravenous PRN Angel Evans MD        potassium chloride (KLOR-CON M) extended release tablet 40 mEq  40 mEq Oral PRN Angel Evans MD        Or    potassium bicarb-citric acid (EFFER-K) effervescent tablet 40 mEq  40 mEq Oral PRN Angel Evans MD        Or    potassium chloride 10 mEq/100 mL IVPB (Peripheral Line)  10 mEq Intravenous PRN Angel Evans MD        magnesium sulfate 1000 mg in dextrose 5% 100 mL IVPB  1,000 mg Intravenous PRN Angel Evans MD        promethazine (PHENERGAN) tablet 12.5 mg  12.5 mg Oral Q6H PRN Angel Evans MD        Or    ondansetron [facility]) injection 4 mg  4 mg Intravenous Q6H PRN Angel Evans MD        acetaminophen (TYLENOL) tablet 650 mg  650 mg Oral Q6H PRN Angel Evans MD        Or    acetaminophen (TYLENOL) suppository 650 mg  650 mg Rectal Q6H PRN Angel Evans MD        enoxaparin (LOVENOX) injection 40 mg  40 mg Subcutaneous Daily Angel Evans MD        melatonin tablet 3 mg  3 mg Oral Nightly PRN Angel Evans MD        folic acid (FOLVITE) tablet 1 mg  1 mg Oral Daily with breakfast Angel Evans MD   1 mg at 05/04/21 0827    therapeutic multivitamin-minerals 1 tablet  1 tablet Oral Daily with breakfast Angel Evans MD   1 tablet at 05/04/21 0827    HYDROmorphone (DILAUDID) injection 1 mg  1 mg Intravenous Q3H PRN Ketty Finney MD   1 mg at 05/04/21 0943       Allergies:      Allergies   Allergen Reactions    Morphine Shortness Of Breath     Other reaction(s): Histamine-like Reaction  Has asthma exacerbation with morphine-histamine type reaction         Social History:     Social History Socioeconomic History    Marital status: Single     Spouse name: Not on file    Number of children: 0    Years of education: Not on file    Highest education level: Not on file   Occupational History    Not on file   Social Needs    Financial resource strain: Not on file    Food insecurity     Worry: Not on file     Inability: Not on file    Transportation needs     Medical: Not on file     Non-medical: Not on file   Tobacco Use    Smoking status: Never Smoker    Smokeless tobacco: Never Used   Substance and Sexual Activity    Alcohol use: Not Currently    Drug use: Never    Sexual activity: Not Currently   Lifestyle    Physical activity     Days per week: Not on file     Minutes per session: Not on file    Stress: Not on file   Relationships    Social connections     Talks on phone: Not on file     Gets together: Not on file     Attends Taoist service: Not on file     Active member of club or organization: Not on file     Attends meetings of clubs or organizations: Not on file     Relationship status: Not on file    Intimate partner violence     Fear of current or ex partner: Not on file     Emotionally abused: Not on file     Physically abused: Not on file     Forced sexual activity: Not on file   Other Topics Concern    Not on file   Social History Narrative    Not on file     Social History     Substance and Sexual Activity   Drug Use Never     Social History     Substance and Sexual Activity   Alcohol Use Not Currently     Social History     Substance and Sexual Activity   Sexual Activity Not Currently     Social History     Tobacco Use   Smoking Status Never Smoker   Smokeless Tobacco Never Used       Family History:     No family history on file. Review of Systems:     Constitutional: Denies fever, sweats, weight loss. .     Eyes: No visual changes or diplopia. No scleral icterus. ENT: No Headaches, no hearing loss, no  vertigo. No mouth sores or sore throat.   Cardiovascular: No chest pain, no dyspnea on exertion, no palpitations, no  loss of consciousness. Respiratory: No cough, no  wheezing, no dyspnea, no sputum production. No hemoptysis. .    Gastrointestinal: No abdominal pain, no appetite loss, no blood in stools. No change in bowel habits. Genitourinary: No dysuria, trouble voiding, or hematuria. Musculoskeletal:  Generalized weakness. No joint complaints. Integumentary: No rash or pruritis. Neurological: No headache, diplopia. No change in gait, balance, or coordination. No paresthesias. Endocrine: No temperature intolerance. No excessive thirst, fluid intake, or urination. Hematologic/Lymphatic: No abnormal bruising or ecchymoses, no blood clots or swollen lymph nodes. Allergic/Immunologic: No nasal congestion or hives. Physical Exam:     BP (!) 109/59   Pulse 72   Temp 97.8 °F (36.6 °C) (Oral)   Resp 18   Ht 5' 8\" (1.727 m)   Wt 194 lb 3.2 oz (88.1 kg)   SpO2 95%   BMI 29.53 kg/m²     CONSTITUTIONAL: awake, alert, cooperative, no apparent distress. EYES:  Pupils equal, round and reactive to light, sclera non-icteric, conjunctiva normal  ENT:  Normocephalic, without obvious abnormality, atraumatic, sinuses nontender on palpation, external ears without lesions, oral pharynx with moist mucus membranes, no mucositis.   NECK:  Supple, symmetrical, trachea midline, no adenopathy, thyroid symmetric, not enlarged and no tenderness, skin normal  HEMATOLOGIC/LYMPHATICS:  no cervical lymphadenopathy, no supraclavicular lymphadenopathy, no axillary lymphadenopathy and no inguinal lymphadenopathy  BACK:  Symmetric, no curvature, spinous processes are non-tender on palpation, paraspinous muscles are non-tender on palpation, no costal vertebral tenderness  LUNGS:  Clear to auscultation bilaterally, no crackles or wheezing  CARDIOVASCULAR:  Regular rate and rhythm, normal S1 and S2, no S3 or S4, and no murmur noted  ABDOMEN:  Normal bowel sounds, soft, non-distended, non-tender, no masses palpated, no hepatosplenomegally  MUSCULOSKELETAL:  There is no redness, warmth, or swelling of the joints  NEUROLOGIC:   No focal findings. SKIN:  Scattered bruising and ecchymoses. EXT: without clubbing, cyanosis or edema. Data:     CBC:  Recent Labs     05/04/21  0616 05/03/21 0126 04/19/21  1300   WBC 15.0* 18.4* 10.7   HGB 8.1* 8.8* 8.6*   HCT 23.2* 25.6* 25.1*   .6* 102.6* 106.0*   * 572* 300       BMP:  Recent Labs     05/04/21  0616 05/03/21 0126 04/19/21  1300    140 139   K 4.3 3.9 4.9   CO2 27 25 25   BUN 6* 7 5*   CREATININE 0.8* 0.8* 0.7*   MG 1.80  --   --        HEPATIC:  Recent Labs     05/04/21 0616 05/03/21 0126 04/19/21  1300   AST 20 27 48*   ALT 15 19 30   ALKPHOS 73 84 69   PROT 6.3* 7.4 7.5   BILITOT 3.7* 4.1* 3.5*       TUMOR MARKERS:  No results for input(s): PSA, CEA, , GL6928,  in the last 720 hours.       MAGNESIUM:    Lab Results   Component Value Date    MG 1.80 05/04/2021       PT/INR:    No results found for: PTINR    Lab Results   Component Value Date    INR 1.44 08/06/2020       PTT:    No results found for: APTT    U/A:    Lab Results   Component Value Date    COLORU Yellow 05/03/2021    PHUR 7.0 05/03/2021    WBCUA None seen 01/05/2021    RBCUA 0-2 01/05/2021    MUCUS 2+ 12/01/2020    BACTERIA 2+ 12/08/2020    CLARITYU Clear 05/03/2021    SPECGRAV 1.010 05/03/2021    LEUKOCYTESUR Negative 05/03/2021    UROBILINOGEN 0.2 05/03/2021    BILIRUBINUR Negative 05/03/2021    BLOODU Negative 05/03/2021    GLUCOSEU Negative 05/03/2021    AMORPHOUS Rare 01/05/2021       Imaging:     EXAMINATION:   ONE XRAY VIEW OF THE CHEST       5/3/2021 1:25 am       COMPARISON:   01/05/2021       HISTORY:   ORDERING SYSTEM PROVIDED HISTORY: R sided CP, hx sickle cell   TECHNOLOGIST PROVIDED HISTORY:   Reason for exam:->R sided CP, hx sickle cell   Reason for Exam: R sided CP, hx sickle cell   Acuity: Unknown   Type of Exam: Unknown     FINDINGS:   The lungs are without acute focal process.  There is no effusion or   pneumothorax. The cardiomediastinal silhouette is without acute process. The   osseous structures are without acute process.           Impression   No acute process. Impression:     Sickle cell crisis       Plan:   Sickle Cell Crisis  - Cont Oxycontin 20 BID   - Oxy 10 mg IR every 4 hours PRN   - Dilaudid 1 mg IVP every 3 hours PRN   - Transfuse Hgb S negative blood today   - Cont IV fluids   - Toradol 30 mg every 8 hours   - Cont folate and Hydrea     Hopefully home in the AM.              Thank you very much for allowing me to participate in the care of this patient.     Didi Pino, LYNNETTE   Medical Oncology/Hematology    [facility] [place] [address] [address]   Phone: [phone]  Fax: [phone]

## 2021-05-05 VITALS
BODY MASS INDEX: 29.7 KG/M2 | HEART RATE: 82 BPM | OXYGEN SATURATION: 97 % | DIASTOLIC BLOOD PRESSURE: 68 MMHG | RESPIRATION RATE: 16 BRPM | HEIGHT: 68 IN | TEMPERATURE: 97.8 F | SYSTOLIC BLOOD PRESSURE: 118 MMHG | WEIGHT: 196 LBS

## 2021-05-05 LAB
A/G RATIO: 1.5 (ref 1.1–2.2)
ALBUMIN SERPL-MCNC: 3.8 G/DL (ref 3.4–5)
ALP BLD-CCNC: 69 U/L (ref 40–129)
ALT SERPL-CCNC: 14 U/L (ref 10–40)
ANION GAP SERPL CALCULATED.3IONS-SCNC: 7 MMOL/L (ref 3–16)
AST SERPL-CCNC: 18 U/L (ref 15–37)
BASOPHILS ABSOLUTE: 0.1 K/UL (ref 0–0.2)
BASOPHILS RELATIVE PERCENT: 0.6 %
BILIRUB SERPL-MCNC: 4.7 MG/DL (ref 0–1)
BUN BLDV-MCNC: 6 MG/DL (ref 7–20)
CALCIUM SERPL-MCNC: 8.9 MG/DL (ref 8.3–10.6)
CHLORIDE BLD-SCNC: 107 MMOL/L (ref 99–110)
CO2: 27 MMOL/L (ref 21–32)
CREAT SERPL-MCNC: 0.7 MG/DL (ref 0.9–1.3)
EOSINOPHILS ABSOLUTE: 0.2 K/UL (ref 0–0.6)
EOSINOPHILS RELATIVE PERCENT: 1.8 %
GFR AFRICAN AMERICAN: >60
GFR NON-AFRICAN AMERICAN: >60
GLOBULIN: 2.6 G/DL
GLUCOSE BLD-MCNC: 91 MG/DL (ref 70–99)
HCT VFR BLD CALC: 27.5 % (ref 40.5–52.5)
HEMOGLOBIN: 9.4 G/DL (ref 13.5–17.5)
LYMPHOCYTES ABSOLUTE: 2.8 K/UL (ref 1–5.1)
LYMPHOCYTES RELATIVE PERCENT: 21.4 %
MAGNESIUM: 1.8 MG/DL (ref 1.8–2.4)
MCH RBC QN AUTO: 34 PG (ref 26–34)
MCHC RBC AUTO-ENTMCNC: 34.2 G/DL (ref 31–36)
MCV RBC AUTO: 99.7 FL (ref 80–100)
MONOCYTES ABSOLUTE: 1.4 K/UL (ref 0–1.3)
MONOCYTES RELATIVE PERCENT: 10.8 %
NEUTROPHILS ABSOLUTE: 8.7 K/UL (ref 1.7–7.7)
NEUTROPHILS RELATIVE PERCENT: 65.4 %
PDW BLD-RTO: 19.9 % (ref 12.4–15.4)
PLATELET # BLD: 526 K/UL (ref 135–450)
PLATELET SLIDE REVIEW: ABNORMAL
PMV BLD AUTO: 8.1 FL (ref 5–10.5)
POTASSIUM SERPL-SCNC: 3.6 MMOL/L (ref 3.5–5.1)
RBC # BLD: 2.76 M/UL (ref 4.2–5.9)
SLIDE REVIEW: ABNORMAL
SODIUM BLD-SCNC: 141 MMOL/L (ref 136–145)
TOTAL PROTEIN: 6.4 G/DL (ref 6.4–8.2)
WBC # BLD: 13.3 K/UL (ref 4–11)

## 2021-05-05 PROCEDURE — 6370000000 HC RX 637 (ALT 250 FOR IP): Performed by: INTERNAL MEDICINE

## 2021-05-05 PROCEDURE — 85025 COMPLETE CBC W/AUTO DIFF WBC: CPT

## 2021-05-05 PROCEDURE — 83735 ASSAY OF MAGNESIUM: CPT

## 2021-05-05 PROCEDURE — 2580000003 HC RX 258: Performed by: FAMILY MEDICINE

## 2021-05-05 PROCEDURE — 6360000002 HC RX W HCPCS: Performed by: FAMILY MEDICINE

## 2021-05-05 PROCEDURE — 80053 COMPREHEN METABOLIC PANEL: CPT

## 2021-05-05 PROCEDURE — 6360000002 HC RX W HCPCS: Performed by: NURSE PRACTITIONER

## 2021-05-05 RX ADMIN — SODIUM CHLORIDE: 9 INJECTION, SOLUTION INTRAVENOUS at 02:29

## 2021-05-05 RX ADMIN — HYDROMORPHONE HYDROCHLORIDE 1 MG: 2 INJECTION, SOLUTION INTRAMUSCULAR; INTRAVENOUS; SUBCUTANEOUS at 12:02

## 2021-05-05 RX ADMIN — KETOROLAC TROMETHAMINE 30 MG: 30 INJECTION, SOLUTION INTRAMUSCULAR; INTRAVENOUS at 08:00

## 2021-05-05 RX ADMIN — HYDROMORPHONE HYDROCHLORIDE 1 MG: 2 INJECTION, SOLUTION INTRAMUSCULAR; INTRAVENOUS; SUBCUTANEOUS at 08:58

## 2021-05-05 RX ADMIN — HYDROXYUREA 1000 MG: 500 CAPSULE ORAL at 07:56

## 2021-05-05 RX ADMIN — HYDROMORPHONE HYDROCHLORIDE 1 MG: 2 INJECTION, SOLUTION INTRAMUSCULAR; INTRAVENOUS; SUBCUTANEOUS at 00:01

## 2021-05-05 RX ADMIN — OXYCODONE HYDROCHLORIDE 20 MG: 20 TABLET, FILM COATED, EXTENDED RELEASE ORAL at 07:54

## 2021-05-05 RX ADMIN — OXYCODONE HYDROCHLORIDE 10 MG: 5 TABLET ORAL at 04:22

## 2021-05-05 RX ADMIN — HYDROMORPHONE HYDROCHLORIDE 1 MG: 2 INJECTION, SOLUTION INTRAMUSCULAR; INTRAVENOUS; SUBCUTANEOUS at 03:00

## 2021-05-05 RX ADMIN — MULTIPLE VITAMINS W/ MINERALS TAB 1 TABLET: TAB at 07:54

## 2021-05-05 RX ADMIN — HYDROMORPHONE HYDROCHLORIDE 1 MG: 2 INJECTION, SOLUTION INTRAMUSCULAR; INTRAVENOUS; SUBCUTANEOUS at 05:59

## 2021-05-05 RX ADMIN — FOLIC ACID 1 MG: 1 TABLET ORAL at 07:54

## 2021-05-05 RX ADMIN — SODIUM CHLORIDE: 9 INJECTION, SOLUTION INTRAVENOUS at 10:00

## 2021-05-05 RX ADMIN — OXYCODONE HYDROCHLORIDE 10 MG: 5 TABLET ORAL at 10:00

## 2021-05-05 ASSESSMENT — PAIN SCALES - GENERAL
PAINLEVEL_OUTOF10: 7
PAINLEVEL_OUTOF10: 10
PAINLEVEL_OUTOF10: 9
PAINLEVEL_OUTOF10: 7
PAINLEVEL_OUTOF10: 7

## 2021-05-05 NOTE — DISCHARGE SUMMARY
Hospital Medicine Discharge Summary    Patient ID: [facility]      Patient's PCP: Willian Yi MD    Admit Date: 5/3/2021     Discharge Date: 5/5/2021      Admitting Physician: Wilian Ugarte MD     Discharge Physician: Lenora Walton MD     Discharge Diagnoses: Active Hospital Problems    Diagnosis    Chronic prescription opiate use [Z79.891]    Sickle cell pain crisis ([address] [D57.00]    Asthma [J45.909]       The patient was seen and examined on day of discharge and this discharge summary is in conjunction with any daily progress note from day of discharge. Hospital Course:     23 y. o. male who presented to [facility] with R shoulder and R hip pain yesterday at 6pm. H/O sickle cell disease. Tried to take home meds without relief. Currently 8/10 - sharp and aching. No radiation. Dilaudid is helping some. Movement makes the pain worse. Denies any recent or current illness. Occasionally has some chills. Poor appetite today. Follows with Dr. Danny Jurado.       Sickle Cell Crisis - start on IVF, pain medication, oxygen. Continued home folic acid, hydrea. Hbg 8.8 which is baseline for patient. Heme/Onc followed. CXR normal.     Asthma - controlled.       Physical Exam Performed:     /68   Pulse 82   Temp 97.8 °F (36.6 °C) (Oral)   Resp 16   Ht 5' 8\" (1.727 m)   Wt 196 lb (88.9 kg)   SpO2 97%   BMI 29.80 kg/m²       General appearance:  No apparent distress, appears stated age and cooperative. HEENT:  Normal cephalic, atraumatic without obvious deformity. Pupils equal, round, and reactive to light. Extra ocular muscles intact. Conjunctivae/corneas clear. Neck: Supple, with full range of motion. No jugular venous distention. Trachea midline. Respiratory:  Normal respiratory effort. Clear to auscultation, bilaterally without Rales/Wheezes/Rhonchi. Cardiovascular:  Regular rate and rhythm with normal S1/S2 without murmurs, rubs or gallops.   Abdomen: Soft, non-tender, non-distended with normal bowel sounds. Musculoskeletal:  No clubbing, cyanosis or edema bilaterally. Full range of motion without deformity. Skin: Skin color, texture, turgor normal.  No rashes or lesions. Neurologic:  Neurovascularly intact without any focal sensory/motor deficits. Cranial nerves: II-XII intact, grossly non-focal.  Psychiatric:  Alert and oriented, thought content appropriate, normal insight  Capillary Refill: Brisk,< 3 seconds   Peripheral Pulses: +2 palpable, equal bilaterally       Labs: For convenience and continuity at follow-up the following most recent labs are provided:      CBC:    Lab Results   Component Value Date    WBC 13.3 05/05/2021    HGB 9.4 05/05/2021    HCT 27.5 05/05/2021     05/05/2021       Renal:    Lab Results   Component Value Date     05/05/2021    K 3.6 05/05/2021    K 3.9 05/03/2021     05/05/2021    CO2 27 05/05/2021    BUN 6 05/05/2021    CREATININE 0.7 05/05/2021    CALCIUM 8.9 05/05/2021         Significant Diagnostic Studies    Radiology:   XR CHEST PORTABLE   Final Result   No acute process. Consults:     IP CONSULT TO ONCOLOGY    Disposition:  Home    Condition at Discharge: Stable    Discharge Instructions/Follow-up:  PCP, Onc    Code Status:  FC    Activity: activity as tolerated    Diet: regular diet      Discharge Medications:     Discharge Medication List as of 5/5/2021 12:55 PM           Details   oxyCODONE (OXYCONTIN) 20 MG extended release tablet TAKE 1 TABLET BY MOUTH EVERY 12 HOURS. TAKE WITH ENOUGH WATER TO SWALLOW WHOLE. DO NOT CRUSH/DISSOLVE/CHEW/CUT/BREAK. Historical Med      oxyCODONE 5 MG capsule Take 10 mg by mouth every 4 hours as needed for Pain. Historical Med      hydroxyurea (HYDREA) 500 MG chemo capsule Take 2 capsules by mouth 2 times dailyHistorical Med             Time Spent on discharge is more than 30 minutes in the examination, evaluation, counseling and review of medications and discharge plan.      Signed:    Carole Ivan MD   5/5/2021      Thank you Ana Martínez MD for the opportunity to be involved in this patient's care. If you have any questions or concerns please feel free to contact me at [phone].

## 2021-05-05 NOTE — PROGRESS NOTES
Secure message sent to Dr. Asya Crandall \"Pt stated he needs to get the oxycodone 5 mg capsule and oxycodone 20 mg Extended release tablet refilled. Per pt, [facility] deferred to you. He would like to get both of them filled here today. \"

## 2021-05-05 NOTE — CARE COORDINATION
Spoke with Cecily Cabot. Stated Dr Mukund Hamilton was ok with d/c today. Patient anxious to go. Anticipate orders soon. No DCP needs.  Reynaldo Phelan, RN

## 2021-05-14 ENCOUNTER — APPOINTMENT (OUTPATIENT)
Dept: GENERAL RADIOLOGY | Age: 21
DRG: 812 | End: 2021-05-14
Payer: COMMERCIAL

## 2021-05-14 ENCOUNTER — HOSPITAL ENCOUNTER (INPATIENT)
Age: 21
LOS: 3 days | Discharge: HOME | DRG: 812 | End: 2021-05-18
Attending: EMERGENCY MEDICINE | Admitting: INTERNAL MEDICINE
Payer: COMMERCIAL

## 2021-05-14 DIAGNOSIS — D72.829 LEUKOCYTOSIS, UNSPECIFIED TYPE: ICD-10-CM

## 2021-05-14 DIAGNOSIS — D57.00 SICKLE CELL CRISIS (HCC): Primary | ICD-10-CM

## 2021-05-14 PROCEDURE — 85025 COMPLETE CBC W/AUTO DIFF WBC: CPT

## 2021-05-14 PROCEDURE — 80053 COMPREHEN METABOLIC PANEL: CPT

## 2021-05-14 PROCEDURE — 83010 ASSAY OF HAPTOGLOBIN QUANT: CPT

## 2021-05-14 PROCEDURE — 71045 X-RAY EXAM CHEST 1 VIEW: CPT

## 2021-05-14 PROCEDURE — 96376 TX/PRO/DX INJ SAME DRUG ADON: CPT

## 2021-05-14 PROCEDURE — 84145 PROCALCITONIN (PCT): CPT

## 2021-05-14 PROCEDURE — 96374 THER/PROPH/DIAG INJ IV PUSH: CPT

## 2021-05-14 PROCEDURE — 83615 LACTATE (LD) (LDH) ENZYME: CPT

## 2021-05-14 PROCEDURE — 85045 AUTOMATED RETICULOCYTE COUNT: CPT

## 2021-05-14 PROCEDURE — 99284 EMERGENCY DEPT VISIT MOD MDM: CPT

## 2021-05-14 RX ADMIN — Medication 1 MG: at 23:57

## 2021-05-14 ASSESSMENT — PAIN SCALES - GENERAL: PAINLEVEL_OUTOF10: 10

## 2021-05-15 LAB
A/G RATIO: 1.4 (ref 1.1–2.2)
ALBUMIN SERPL-MCNC: 4.7 G/DL (ref 3.4–5)
ALP BLD-CCNC: 102 U/L (ref 40–129)
ALT SERPL-CCNC: 49 U/L (ref 10–40)
ANION GAP SERPL CALCULATED.3IONS-SCNC: 12 MMOL/L (ref 3–16)
ANISOCYTOSIS: ABNORMAL
AST SERPL-CCNC: 54 U/L (ref 15–37)
BANDED NEUTROPHILS RELATIVE PERCENT: 3 % (ref 0–7)
BASOPHILS ABSOLUTE: 0 K/UL (ref 0–0.2)
BASOPHILS RELATIVE PERCENT: 0 %
BILIRUB SERPL-MCNC: 4.4 MG/DL (ref 0–1)
BILIRUBIN URINE: NEGATIVE
BLOOD, URINE: ABNORMAL
BUN BLDV-MCNC: 9 MG/DL (ref 7–20)
CALCIUM SERPL-MCNC: 9.9 MG/DL (ref 8.3–10.6)
CHLORIDE BLD-SCNC: 105 MMOL/L (ref 99–110)
CLARITY: CLEAR
CO2: 23 MMOL/L (ref 21–32)
COLOR: YELLOW
CREAT SERPL-MCNC: 0.7 MG/DL (ref 0.9–1.3)
EOSINOPHILS ABSOLUTE: 0 K/UL (ref 0–0.6)
EOSINOPHILS RELATIVE PERCENT: 0 %
GFR AFRICAN AMERICAN: >60
GFR NON-AFRICAN AMERICAN: >60
GLOBULIN: 3.4 G/DL
GLUCOSE BLD-MCNC: 104 MG/DL (ref 70–99)
GLUCOSE URINE: NEGATIVE MG/DL
HAPTOGLOBIN: <10 MG/DL (ref 30–200)
HCT VFR BLD CALC: 26.5 % (ref 40.5–52.5)
HEMATOLOGY PATH CONSULT: NO
HEMOGLOBIN: 9 G/DL (ref 13.5–17.5)
IMMATURE RETIC FRACT: 0.59 (ref 0.21–0.37)
KETONES, URINE: NEGATIVE MG/DL
LACTATE DEHYDROGENASE: 766 U/L (ref 100–190)
LEUKOCYTE ESTERASE, URINE: NEGATIVE
LYMPHOCYTES ABSOLUTE: 2.3 K/UL (ref 1–5.1)
LYMPHOCYTES RELATIVE PERCENT: 10 %
MACROCYTES: ABNORMAL
MCH RBC QN AUTO: 34 PG (ref 26–34)
MCHC RBC AUTO-ENTMCNC: 34 G/DL (ref 31–36)
MCV RBC AUTO: 100 FL (ref 80–100)
MICROSCOPIC EXAMINATION: YES
MONOCYTES ABSOLUTE: 1.8 K/UL (ref 0–1.3)
MONOCYTES RELATIVE PERCENT: 8 %
NEUTROPHILS ABSOLUTE: 18.5 K/UL (ref 1.7–7.7)
NEUTROPHILS RELATIVE PERCENT: 79 %
NITRITE, URINE: NEGATIVE
OVALOCYTES: ABNORMAL
PDW BLD-RTO: 18.1 % (ref 12.4–15.4)
PH UA: 7 (ref 5–8)
PLATELET # BLD: 494 K/UL (ref 135–450)
PLATELET SLIDE REVIEW: ABNORMAL
PMV BLD AUTO: 8.8 FL (ref 5–10.5)
POIKILOCYTES: ABNORMAL
POLYCHROMASIA: ABNORMAL
POTASSIUM REFLEX MAGNESIUM: 4.1 MMOL/L (ref 3.5–5.1)
PROCALCITONIN: 0.15 NG/ML (ref 0–0.15)
PROTEIN UA: NEGATIVE MG/DL
RBC # BLD: 2.65 M/UL (ref 4.2–5.9)
RBC UA: NORMAL /HPF (ref 0–4)
RETICULOCYTE ABSOLUTE COUNT: 0.18 M/UL
RETICULOCYTE COUNT PCT: 6.87 % (ref 0.5–2.18)
SICKLE CELLS: ABNORMAL
SODIUM BLD-SCNC: 140 MMOL/L (ref 136–145)
SPECIFIC GRAVITY UA: 1.01 (ref 1–1.03)
TARGET CELLS: ABNORMAL
TOTAL PROTEIN: 8.1 G/DL (ref 6.4–8.2)
URINE TYPE: ABNORMAL
UROBILINOGEN, URINE: 1 E.U./DL
WBC # BLD: 22.6 K/UL (ref 4–11)
WBC UA: NORMAL /HPF (ref 0–5)

## 2021-05-15 PROCEDURE — 6370000000 HC RX 637 (ALT 250 FOR IP): Performed by: NURSE PRACTITIONER

## 2021-05-15 PROCEDURE — 6370000000 HC RX 637 (ALT 250 FOR IP): Performed by: INTERNAL MEDICINE

## 2021-05-15 PROCEDURE — 6370000000 HC RX 637 (ALT 250 FOR IP)

## 2021-05-15 PROCEDURE — 87040 BLOOD CULTURE FOR BACTERIA: CPT

## 2021-05-15 PROCEDURE — 2580000003 HC RX 258: Performed by: EMERGENCY MEDICINE

## 2021-05-15 PROCEDURE — 1200000000 HC SEMI PRIVATE

## 2021-05-15 PROCEDURE — 81001 URINALYSIS AUTO W/SCOPE: CPT

## 2021-05-15 PROCEDURE — 6370000000 HC RX 637 (ALT 250 FOR IP): Performed by: HOSPITALIST

## 2021-05-15 PROCEDURE — 2580000003 HC RX 258: Performed by: HOSPITALIST

## 2021-05-15 PROCEDURE — 6360000002 HC RX W HCPCS: Performed by: HOSPITALIST

## 2021-05-15 RX ORDER — 0.9 % SODIUM CHLORIDE 0.9 %
1000 INTRAVENOUS SOLUTION INTRAVENOUS ONCE
Status: COMPLETED | OUTPATIENT
Start: 2021-05-15 | End: 2021-05-15

## 2021-05-15 RX ORDER — SODIUM CHLORIDE 9 MG/ML
INJECTION, SOLUTION INTRAVENOUS CONTINUOUS
Status: DISCONTINUED | OUTPATIENT
Start: 2021-05-15 | End: 2021-05-18 | Stop reason: HOSPADM

## 2021-05-15 RX ORDER — HYDROXYUREA 500 MG/1
1000 CAPSULE ORAL 2 TIMES DAILY
Status: DISCONTINUED | OUTPATIENT
Start: 2021-05-15 | End: 2021-05-18 | Stop reason: HOSPADM

## 2021-05-15 RX ORDER — HYDROMORPHONE HCL 110MG/55ML
1 PATIENT CONTROLLED ANALGESIA SYRINGE INTRAVENOUS
Status: DISCONTINUED | OUTPATIENT
Start: 2021-05-15 | End: 2021-05-16

## 2021-05-15 RX ORDER — FAMOTIDINE 20 MG/1
20 TABLET, FILM COATED ORAL 2 TIMES DAILY
Status: DISCONTINUED | OUTPATIENT
Start: 2021-05-15 | End: 2021-05-18 | Stop reason: HOSPADM

## 2021-05-15 RX ORDER — OXYCODONE HYDROCHLORIDE 5 MG/1
5 TABLET ORAL EVERY 4 HOURS PRN
Status: DISCONTINUED | OUTPATIENT
Start: 2021-05-15 | End: 2021-05-18 | Stop reason: HOSPADM

## 2021-05-15 RX ORDER — FOLIC ACID 1 MG/1
1 TABLET ORAL DAILY
Status: DISCONTINUED | OUTPATIENT
Start: 2021-05-15 | End: 2021-05-18 | Stop reason: HOSPADM

## 2021-05-15 RX ORDER — OXYCODONE HYDROCHLORIDE 5 MG/1
10 TABLET ORAL EVERY 4 HOURS PRN
Status: COMPLETED | OUTPATIENT
Start: 2021-05-15 | End: 2021-05-16

## 2021-05-15 RX ORDER — OXYCODONE HYDROCHLORIDE 5 MG/1
10 TABLET ORAL EVERY 4 HOURS PRN
Status: COMPLETED | OUTPATIENT
Start: 2021-05-15 | End: 2021-05-15

## 2021-05-15 RX ADMIN — FAMOTIDINE 20 MG: 20 TABLET ORAL at 20:31

## 2021-05-15 RX ADMIN — HYDROXYUREA 1000 MG: 500 CAPSULE ORAL at 20:31

## 2021-05-15 RX ADMIN — Medication 1 MG: at 10:12

## 2021-05-15 RX ADMIN — Medication: at 22:50

## 2021-05-15 RX ADMIN — SODIUM CHLORIDE: 9 INJECTION, SOLUTION INTRAVENOUS at 18:47

## 2021-05-15 RX ADMIN — FOLIC ACID 1 MG: 1 TABLET ORAL at 13:29

## 2021-05-15 RX ADMIN — OXYCODONE 10 MG: 5 TABLET ORAL at 18:44

## 2021-05-15 RX ADMIN — SODIUM CHLORIDE 1000 ML: 9 INJECTION, SOLUTION INTRAVENOUS at 00:40

## 2021-05-15 RX ADMIN — OXYCODONE 10 MG: 5 TABLET ORAL at 14:44

## 2021-05-15 RX ADMIN — Medication 1 MG: at 00:40

## 2021-05-15 RX ADMIN — Medication 1 MG: at 06:42

## 2021-05-15 RX ADMIN — HYDROMORPHONE HYDROCHLORIDE 1 MG: 2 INJECTION, SOLUTION INTRAMUSCULAR; INTRAVENOUS; SUBCUTANEOUS at 19:54

## 2021-05-15 RX ADMIN — SODIUM CHLORIDE: 9 INJECTION, SOLUTION INTRAVENOUS at 10:45

## 2021-05-15 RX ADMIN — OXYCODONE 10 MG: 5 TABLET ORAL at 23:57

## 2021-05-15 RX ADMIN — OXYCODONE 10 MG: 5 TABLET ORAL at 08:30

## 2021-05-15 RX ADMIN — HYDROMORPHONE HYDROCHLORIDE 1 MG: 2 INJECTION, SOLUTION INTRAMUSCULAR; INTRAVENOUS; SUBCUTANEOUS at 16:39

## 2021-05-15 RX ADMIN — HYDROMORPHONE HYDROCHLORIDE 1 MG: 2 INJECTION, SOLUTION INTRAMUSCULAR; INTRAVENOUS; SUBCUTANEOUS at 22:50

## 2021-05-15 RX ADMIN — FAMOTIDINE 20 MG: 20 TABLET ORAL at 08:30

## 2021-05-15 RX ADMIN — HYDROMORPHONE HYDROCHLORIDE 1 MG: 2 INJECTION, SOLUTION INTRAMUSCULAR; INTRAVENOUS; SUBCUTANEOUS at 13:28

## 2021-05-15 ASSESSMENT — PAIN SCALES - GENERAL
PAINLEVEL_OUTOF10: 8
PAINLEVEL_OUTOF10: 5
PAINLEVEL_OUTOF10: 7
PAINLEVEL_OUTOF10: 6
PAINLEVEL_OUTOF10: 8
PAINLEVEL_OUTOF10: 7
PAINLEVEL_OUTOF10: 8
PAINLEVEL_OUTOF10: 8
PAINLEVEL_OUTOF10: 7

## 2021-05-15 ASSESSMENT — PAIN DESCRIPTION - DESCRIPTORS: DESCRIPTORS: ACHING

## 2021-05-15 ASSESSMENT — PAIN DESCRIPTION - FREQUENCY: FREQUENCY: CONTINUOUS

## 2021-05-15 ASSESSMENT — PAIN DESCRIPTION - ORIENTATION: ORIENTATION: RIGHT

## 2021-05-15 NOTE — ED NOTES
Pt informed of diet order and asked if pt wanted a menu, pt denied. Pt also informed about room assignment and admission. Voiced understanding. VSS. Denies any further needs, call light within reach, will continue to monitor.         Alan Doe RN  05/15/21 7021

## 2021-05-15 NOTE — ED PROVIDER NOTES
[facility]  ED    CHIEF COMPLAINT  Sickle Cell Pain Crisis (Patient comes into ED with c/o sickle cell pain that began earlier this week. )       Karina0 Sean Pierce is a 23 y.o. male who presents to the ED with complaint of sickle cell pain crisis. Patient complains of pain at right hip, right shoulder, left knee. Pain is \"12\"/10 intensity. Sharp/aching. Worse with movement, better with rest. Feels similar to previous crises. Symptoms started Wednesday. Tried oxycodone without improvement. Last dose 1600 today. Denies fever, chest pain, SOB, nausea, vomiting, diarrhea, abdominal pain. No other complaints, modifying factors or associated symptoms. 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 No pertinent family history. Social History     Socioeconomic History    Marital status: Single     Spouse name: Not on file    Number of children: 0    Years of education: Not on file    Highest education level: Not on file   Occupational History    Not on file   Tobacco Use    Smoking status: Never Smoker    Smokeless tobacco: Never Used   Vaping Use    Vaping Use: Never used   Substance and Sexual Activity    Alcohol use: Not Currently    Drug use: Never    Sexual activity: Not Currently   Other Topics Concern    Not on file   Social History Narrative    Not on file     Social Determinants of Health     Financial Resource Strain:     Difficulty of Paying Living Expenses:    Food Insecurity:     Worried About Running Out of Food in the Last Year:     [address] in the Last Year:    Transportation Needs:     Lack of Transportation (Medical):      Lack of Transportation unlabored. CTAB. Good air exchange. Speaking comfortably in full sentences. Abdomen: Soft. Non-tender. Non-distended. No rebound or guarding. Musculoskeletal: No extremity edema. No deformity. No tenderness in the extremities. All extremities neurovascularly intact. The patient's right hip, right shoulder, and left knee have no overlying erythema, no asymmetric warmth. Each has full ROM. Skin: Warm and dry. No acute rashes. Neurological: Alert and oriented. CN II-XII intact. Strength 5/5 bilateral upper and lower extremities. Sensation intact to light touch. Psychiatric: Mood/affect: normal      LABS  I have reviewed all labs for this visit.    Results for orders placed or performed during the hospital encounter of 05/14/21   CBC Auto Differential   Result Value Ref Range    WBC 22.6 (H) 4.0 - 11.0 K/uL    RBC 2.65 (L) 4.20 - 5.90 M/uL    Hemoglobin 9.0 (L) 13.5 - 17.5 g/dL    Hematocrit 26.5 (L) 40.5 - 52.5 %    .0 80.0 - 100.0 fL    MCH 34.0 26.0 - 34.0 pg    MCHC 34.0 31.0 - 36.0 g/dL    RDW 18.1 (H) 12.4 - 15.4 %    Platelets 200 (H) 315 - 450 K/uL    MPV 8.8 5.0 - 10.5 fL    PLATELET SLIDE REVIEW Increased     Path Consult No     Neutrophils % 79.0 %    Lymphocytes % 10.0 %    Monocytes % 8.0 %    Eosinophils % 0.0 %    Basophils % 0.0 %    Neutrophils Absolute 18.5 (H) 1.7 - 7.7 K/uL    Lymphocytes Absolute 2.3 1.0 - 5.1 K/uL    Monocytes Absolute 1.8 (H) 0.0 - 1.3 K/uL    Eosinophils Absolute 0.0 0.0 - 0.6 K/uL    Basophils Absolute 0.0 0.0 - 0.2 K/uL    Bands Relative 3 0 - 7 %    Anisocytosis 2+ (A)     Macrocytes 1+ (A)     Polychromasia Occasional (A)     Poikilocytes 3+ (A)     Ovalocytes 1+ (A)     Target Cells Occasional (A)     Sickle Cells Occasional (A)    Comprehensive Metabolic Panel w/ Reflex to MG   Result Value Ref Range    Sodium 140 136 - 145 mmol/L    Potassium reflex Magnesium 4.1 3.5 - 5.1 mmol/L    Chloride 105 99 - 110 mmol/L    CO2 23 21 - 32 mmol/L    Anion Gap 12 3 - 16    Glucose 104 (H) 70 - 99 mg/dL    BUN 9 7 - 20 mg/dL    CREATININE 0.7 (L) 0.9 - 1.3 mg/dL    GFR Non-African American >60 >60    GFR African American >60 >60    Calcium 9.9 8.3 - 10.6 mg/dL    Total Protein 8.1 6.4 - 8.2 g/dL    Albumin 4.7 3.4 - 5.0 g/dL    Albumin/Globulin Ratio 1.4 1.1 - 2.2    Total Bilirubin 4.4 (H) 0.0 - 1.0 mg/dL    Alkaline Phosphatase 102 40 - 129 U/L    ALT 49 (H) 10 - 40 U/L    AST 54 (H) 15 - 37 U/L    Globulin 3.4 g/dL   Reticulocytes   Result Value Ref Range    Retic Ct Pct 6.87 (H) 0.50 - 2.18 %    Retic Ct Abs 0.182 M/uL    Immature Retic Fract 0.59 (H) 0.21 - 0.37   Lactate Dehydrogenase   Result Value Ref Range     (H) 100 - 190 U/L   Urinalysis   Result Value Ref Range    Color, UA Yellow Straw/Yellow    Clarity, UA Clear Clear    Glucose, Ur Negative Negative mg/dL    Bilirubin Urine Negative Negative    Ketones, Urine Negative Negative mg/dL    Specific Gravity, UA 1.015 1.005 - 1.030    Blood, Urine TRACE-INTACT (A) Negative    pH, UA 7.0 5.0 - 8.0    Protein, UA Negative Negative mg/dL    Urobilinogen, Urine 1.0 <2.0 E.U./dL    Nitrite, Urine Negative Negative    Leukocyte Esterase, Urine Negative Negative    Microscopic Examination YES     Urine Type NotGiven    Procalcitonin   Result Value Ref Range    Procalcitonin 0.15 0.00 - 0.15 ng/mL   Microscopic Urinalysis   Result Value Ref Range    WBC, UA 0-2 0 - 5 /HPF    RBC, UA 0-2 0 - 4 /HPF       RADIOLOGY  I have reviewed all radiographic studies for this visit. XR CHEST PORTABLE   Final Result   Clear lungs. Cardiomegaly. No acute cardiopulmonary abnormality. ED COURSE/MDM  Patient seen and evaluated. Old records reviewed. Labs and imaging reviewed and results discussed with patient/family to extent possible. This is a 79-year-old male with history of sickle cell disease presenting with sickle cell crisis. Vitals reassuring.   Patient was treated with several doses of Dilaudid without improvement in his symptoms. His labs are notable for CBC with white count 22.6. No clear source of an infection. Chest x-ray is negative - not concerning for acute chest.  The affected joints have full range of motion, no overlying erythema or asymmetric warmth, not concerning for septic arthritis. Consistent with previous sickle cell exacerbations. Renal panel no significant electrolyte abnormalities or creatinine elevation. I note hemoglobin of 9, reticulocyte count 6.9%. Given failure to achieve adequate pain control, admit to hospital medicine for further evaluation and treatment. During the patient's ED course, the patient was given:  Medications   HYDROmorphone (DILAUDID) 1 MG/ML injection (has no administration in time range)   HYDROmorphone (DILAUDID) injection 1 mg (has no administration in time range)   HYDROmorphone (DILAUDID) injection 1 mg (1 mg Intravenous Given 5/14/21 1977)   0.9 % sodium chloride bolus (1,000 mLs Intravenous New Bag 5/15/21 0040)   HYDROmorphone (DILAUDID) injection 1 mg (1 mg Intravenous Given 5/15/21 0040)        All questions were answered and the patient/family expressed understanding and agreement with the plan. PROCEDURES  None    CRITICAL CARE  N/A    CLINICAL IMPRESSION  1. Sickle cell crisis (HCC)    2. Leukocytosis, unspecified type        DISPOSITION   admit    Klever Hammonds MD    Note: This chart was created using voice recognition dictation software. Efforts were made by me to ensure accuracy, however some errors may be present due to limitations of this technology and occasionally words are not transcribed correctly.         Klever Hammonds MD  05/15/21 5212

## 2021-05-15 NOTE — CONSULTS
ONCOLOGY HEMATOLOGY CARE CONSULT NOTE      Requesting Physician:  Dr. Washington Granados:  Sickle cell pain crisis        HISTORY OF PRESENT ILLNESS:      Mr. Janelle Dotson  is a 23 y.o. male we are seeing in consultation for a sickle cell pain crisis. This patient is cared for by my partner, Dr. Asya Crandall. He is being admitted for a pain crisis. In the ER he was afebrile and on room air. A CXR was clear. Urine was negative. Procalcitonin was WNL. Labs for hemolysis were positive, as is expected. Today, he reports that he thinks that he was getting Dilaudid 2 mg IV last visit. He was getting 1 mg based on my review of the EMR. Pain is in the right shoulder and right hip, which has been a consistent complaint.     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)     Sickle cell anemia (HCC)      Past Surgical History:    Past Surgical History:   Procedure Laterality Date    SPLENECTOMY         Current Medications:    Current Facility-Administered Medications   Medication Dose Route Frequency Provider Last Rate Last Admin    folic acid (FOLVITE) tablet 1 mg  1 mg Oral Daily Georgina Carr,         famotidine (PEPCID) tablet 20 mg  20 mg Oral BID Georgina Carr, DO   20 mg at 05/15/21 0830    oxyCODONE (ROXICODONE) immediate release tablet 5 mg  5 mg Oral Q4H PRN Israel Carr, DO        oxyCODONE (ROXICODONE) immediate release tablet 10 mg  10 mg Oral Q4H PRN Georgina Carr, DO   10 mg at 05/15/21 0830    HYDROmorphone (DILAUDID) injection 1 mg  1 mg Intravenous Q3H PRN Nathalia Mario MD   1 mg at 05/15/21 1012    0.9 % sodium chloride infusion   Intravenous Continuous Nathalia Mario  mL/hr at 05/15/21 1045 New Bag at 05/15/21 1045    enoxaparin (LOVENOX) injection 40 mg  40 mg Subcutaneous Daily Nathalia Mario MD         Current Outpatient Medications   Medication Sig Dispense Refill    oxyCODONE (OXYCONTIN) 20 MG extended release tablet TAKE 1 TABLET BY MOUTH EVERY 12 HOURS. TAKE WITH ENOUGH WATER TO SWALLOW WHOLE. DO NOT CRUSH/DISSOLVE/CHEW/CUT/BREAK.  oxyCODONE 5 MG capsule Take 10 mg by mouth every 4 hours as needed for Pain.  hydroxyurea (HYDREA) 500 MG chemo capsule Take 2 capsules by mouth 2 times daily       Allergies: Allergies   Allergen Reactions    Morphine Shortness Of Breath     Other reaction(s): Histamine-like Reaction  Has asthma exacerbation with morphine-histamine type reaction         Social History:   Social History     Socioeconomic History    Marital status: Single     Spouse name: Not on file    Number of children: 0    Years of education: Not on file    Highest education level: Not on file   Occupational History    Not on file   Tobacco Use    Smoking status: Never Smoker    Smokeless tobacco: Never Used   Vaping Use    Vaping Use: Never used   Substance and Sexual Activity    Alcohol use: Not Currently    Drug use: Never    Sexual activity: Not Currently   Other Topics Concern    Not on file   Social History Narrative    Not on file     Social Determinants of Health     Financial Resource Strain:     Difficulty of Paying Living Expenses:    Food Insecurity:     Worried About Running Out of Food in the Last Year:     Ran Out of Food in the Last Year:    Transportation Needs:     Lack of Transportation (Medical):      Lack of Transportation (Non-Medical):    Physical Activity:     Days of Exercise per Week:     Minutes of Exercise per Session:    Stress:     Feeling of Stress :    Social Connections:     Frequency of Communication with Friends and Family:     Frequency of Social Gatherings with Friends and Family:     Attends Hoahaoism Services:     Active Member of Clubs or Organizations:     Attends Club or Organization Meetings:     Marital Status:    Intimate Partner Violence:     Fear of Current or Ex-Partner:     Emotionally Abused:     Physically Abused:     Sexually Abused:           Family History:     History reviewed. No pertinent family history. REVIEW OF SYSTEMS:    Review of Systems    PHYSICAL EXAM:      Vitals:  /75   Pulse 74   Temp 98.5 °F (36.9 °C)   Resp 17   Ht 5' 8\" (1.727 m)   Wt 190 lb (86.2 kg)   SpO2 98%   BMI 28.89 kg/m²     CONSTITUTIONAL:  awake, alert, cooperative, no apparent distress, and appears stated age NAD  EYES:  pupils equal, round and reactive to light, extra ocular muscles intact,sclera clear, conjunctiva normal  NECK:  Supple, symmetrical, trachea midline, no adenopathy, thyroid symmetric, not enlarged and no tenderness, skin normal  HEMATOLOGIC/LYMPHATICS:  no cervical lymphadenopathy, nosupraclavicular lymphadenopathy, no axillary lymphadenopathy and no inguinal lymphadenopathy  LUNGS:  No increased work of breathing, good air exchange, clear to auscultation bilaterally, no crackles or wheezing  CARDIOVASCULAR:  , regular rate and rhythm, normal S1 and S2, no S3 or S4, and no murmur noted  ABDOMEN:  No scars, normal bowel sounds, soft, non-distended, non-tender, no masses palpated, no hepatosplenomegally      MUSCULOSKELETAL:  There is no redness, warmth, or swelling of the joints. Full range of motion noted. Motor strength is 5 out of 5 all extremities bilaterally. NEUROLOGIC:  Awake, alert, oriented to name, place andtime. Cranial nerves II-XII are grossly intact. Motor is 5 out of 5 bilaterally. SKIN:  no bruising or bleeding      DATA:    PT/INR:    Recent Labs     05/14/21  2355   PROT 8.1     PTT:No results for input(s): APTT in the last 72 hours.   CMP:    Lab Results   Component Value Date     05/14/2021    K 4.1 05/14/2021     05/14/2021    CO2 23 05/14/2021    BUN 9 05/14/2021    PROT 8.1 05/14/2021     Magnesium:    Lab Results   Component Value Date    MG 1.80 05/05/2021     Phosphorus:  No components found

## 2021-05-15 NOTE — ED NOTES
Pt requesting MD be paged to see if they will change his dilaudid order to PRN Q3 hours like previous admission. Sts it helped and pain was better controlled. Md paged.      Diana Albarran RN  05/15/21 2104

## 2021-05-15 NOTE — H&P
Hospital Medicine History & Physical      PCP: Iván Hutchinson MD    Date of Admission: 5/14/2021    Date of Service: Pt seen/examined on 5/15/2021 and Admitted to Inpatient with expected LOS greater than two midnights due to medical therapy. Chief Complaint: Right hip, right shoulder, left knee pain      History Of Present Illness:      23 y.o. male who presented to [facility] with right hip right shoulder, left knee pain started on Wednesday worsening yesterday denies any fever chills no chest pain or shortness of breath no cough no sputum no nausea vomiting no diarrhea no recent weight loss or weight gain no change in appetite he does not smoke or drink alcohol. He is followed by Dr. Ham Tran Hem/oncologist.    Past Medical History:          Diagnosis Date    Accidental overdose     Asthma     Enlarged heart     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 [facility] [address]     Sickle cell anemia ([facility] [address]        Past Surgical History:          Procedure Laterality Date    SPLENECTOMY         Medications Prior to Admission:      Prior to Admission medications    Medication Sig Start Date End Date Taking? Authorizing Provider   oxyCODONE (OXYCONTIN) 20 MG extended release tablet TAKE 1 TABLET BY MOUTH EVERY 12 HOURS. TAKE WITH ENOUGH WATER TO SWALLOW WHOLE. DO NOT CRUSH/DISSOLVE/CHEW/CUT/BREAK. 4/12/21   Historical Provider, MD   oxyCODONE 5 MG capsule Take 10 mg by mouth every 4 hours as needed for Pain. Historical Provider, MD   hydroxyurea (HYDREA) 500 MG chemo capsule Take 2 capsules by mouth 2 times daily 12/9/20   William Villalta MD       Allergies:  Morphine    Social History:      The patient currently lives with his family    TOBACCO:   reports that he has never smoked. He has never used smokeless tobacco.  ETOH:   reports previous alcohol use.   E-Cigarettes/Vaping Use     Questions Responses    E-Cigarette/Vaping Use Never User    Start Date     Passive Exposure     Quit Date     Counseling Given Comments             Family History:       Reviewed in detail and negative for DM, CAD, Cancer, CVA. Positive as follows:    History reviewed. No pertinent family history. REVIEW OF SYSTEMS COMPLETED:   Pertinent positives as noted in the HPI. All other systems reviewed and negative. PHYSICAL EXAM PERFORMED:    /75   Pulse 74   Temp 98.5 °F (36.9 °C)   Resp 17   Ht 5' 8\" (1.727 m)   Wt 190 lb (86.2 kg)   SpO2 98%   BMI 28.89 kg/m²     General appearance:  No apparent distress, appears stated age and cooperative. HEENT:  Normal cephalic, atraumatic without obvious deformity. Pupils equal, round, and reactive to light. Extra ocular muscles intact. Conjunctivae/corneas clear. Neck: Supple, with full range of motion. No jugular venous distention. Trachea midline. Respiratory:  Normal respiratory effort. Clear to auscultation, bilaterally without Rales/Wheezes/Rhonchi. Cardiovascular:  Regular rate and rhythm with normal S1/S2 without murmurs, rubs or gallops. Abdomen: Soft, non-tender, non-distended with normal bowel sounds. Musculoskeletal:  No clubbing, cyanosis or edema bilaterally. Full range of motion without deformity. Skin: Skin color, texture, turgor normal.  No rashes or lesions. Neurologic:  Neurovascularly intact without any focal sensory/motor deficits. Cranial nerves: II-XII intact, grossly non-focal.  Psychiatric:  Alert and oriented, thought content appropriate, normal insight  Capillary Refill: Brisk,3 seconds, normal  Peripheral Pulses: +2 palpable, equal bilaterally       Labs:     Recent Labs     05/14/21  2355   WBC 22.6*   HGB 9.0*   HCT 26.5*   *     Recent Labs     05/14/21  2355      K 4.1      CO2 23   BUN 9   CREATININE 0.7*   CALCIUM 9.9     Recent Labs     05/14/21  2355   AST 54*   ALT 49*   BILITOT 4.4*   ALKPHOS 102     No results for input(s): INR in the last 72 hours.   No results for input(s): Senait Brasher in the last 72

## 2021-05-15 NOTE — ED NOTES
Presents with pain in right shoulder. Reporting last took home medications at 1600. No relief. Aletha RN attempting U/S IV d/t poor vascular access.       Primo Means RN  05/14/21 9087

## 2021-05-16 LAB
ANION GAP SERPL CALCULATED.3IONS-SCNC: 9 MMOL/L (ref 3–16)
BASOPHILS ABSOLUTE: 0.1 K/UL (ref 0–0.2)
BASOPHILS RELATIVE PERCENT: 0.4 %
BUN BLDV-MCNC: 9 MG/DL (ref 7–20)
CALCIUM SERPL-MCNC: 8.8 MG/DL (ref 8.3–10.6)
CHLORIDE BLD-SCNC: 106 MMOL/L (ref 99–110)
CO2: 23 MMOL/L (ref 21–32)
CREAT SERPL-MCNC: 0.7 MG/DL (ref 0.9–1.3)
EOSINOPHILS ABSOLUTE: 0.1 K/UL (ref 0–0.6)
EOSINOPHILS RELATIVE PERCENT: 0.7 %
GFR AFRICAN AMERICAN: >60
GFR NON-AFRICAN AMERICAN: >60
GLUCOSE BLD-MCNC: 135 MG/DL (ref 70–99)
HCT VFR BLD CALC: 22.1 % (ref 40.5–52.5)
HCT VFR BLD CALC: 22.9 % (ref 40.5–52.5)
HEMOGLOBIN: 7.6 G/DL (ref 13.5–17.5)
HEMOGLOBIN: 7.8 G/DL (ref 13.5–17.5)
IMMATURE RETIC FRACT: 0.62 (ref 0.21–0.37)
LYMPHOCYTES ABSOLUTE: 2.6 K/UL (ref 1–5.1)
LYMPHOCYTES RELATIVE PERCENT: 18.2 %
MCH RBC QN AUTO: 33.9 PG (ref 26–34)
MCHC RBC AUTO-ENTMCNC: 34.1 G/DL (ref 31–36)
MCV RBC AUTO: 99.3 FL (ref 80–100)
MONOCYTES ABSOLUTE: 1.9 K/UL (ref 0–1.3)
MONOCYTES RELATIVE PERCENT: 13.5 %
NEUTROPHILS ABSOLUTE: 9.7 K/UL (ref 1.7–7.7)
NEUTROPHILS RELATIVE PERCENT: 67.2 %
PDW BLD-RTO: 17.8 % (ref 12.4–15.4)
PLATELET # BLD: 439 K/UL (ref 135–450)
PMV BLD AUTO: 9.2 FL (ref 5–10.5)
POTASSIUM SERPL-SCNC: 3.7 MMOL/L (ref 3.5–5.1)
RBC # BLD: 2.3 M/UL (ref 4.2–5.9)
RETICULOCYTE ABSOLUTE COUNT: 0.17 M/UL
RETICULOCYTE COUNT PCT: 7.19 % (ref 0.5–2.18)
SODIUM BLD-SCNC: 138 MMOL/L (ref 136–145)
WBC # BLD: 14.4 K/UL (ref 4–11)

## 2021-05-16 PROCEDURE — 6370000000 HC RX 637 (ALT 250 FOR IP): Performed by: INTERNAL MEDICINE

## 2021-05-16 PROCEDURE — 2700000000 HC OXYGEN THERAPY PER DAY

## 2021-05-16 PROCEDURE — 85018 HEMOGLOBIN: CPT

## 2021-05-16 PROCEDURE — 2580000003 HC RX 258: Performed by: HOSPITALIST

## 2021-05-16 PROCEDURE — 85045 AUTOMATED RETICULOCYTE COUNT: CPT

## 2021-05-16 PROCEDURE — 85025 COMPLETE CBC W/AUTO DIFF WBC: CPT

## 2021-05-16 PROCEDURE — 85014 HEMATOCRIT: CPT

## 2021-05-16 PROCEDURE — 6360000002 HC RX W HCPCS: Performed by: NURSE PRACTITIONER

## 2021-05-16 PROCEDURE — 6370000000 HC RX 637 (ALT 250 FOR IP): Performed by: HOSPITALIST

## 2021-05-16 PROCEDURE — 6360000002 HC RX W HCPCS: Performed by: HOSPITALIST

## 2021-05-16 PROCEDURE — 6370000000 HC RX 637 (ALT 250 FOR IP): Performed by: NURSE PRACTITIONER

## 2021-05-16 PROCEDURE — 36415 COLL VENOUS BLD VENIPUNCTURE: CPT

## 2021-05-16 PROCEDURE — 1200000000 HC SEMI PRIVATE

## 2021-05-16 PROCEDURE — 80048 BASIC METABOLIC PNL TOTAL CA: CPT

## 2021-05-16 PROCEDURE — 94761 N-INVAS EAR/PLS OXIMETRY MLT: CPT

## 2021-05-16 RX ORDER — HYDROMORPHONE HCL 110MG/55ML
1 PATIENT CONTROLLED ANALGESIA SYRINGE INTRAVENOUS
Status: DISCONTINUED | OUTPATIENT
Start: 2021-05-16 | End: 2021-05-18 | Stop reason: HOSPADM

## 2021-05-16 RX ORDER — OXYCODONE HYDROCHLORIDE 5 MG/1
10 TABLET ORAL EVERY 4 HOURS PRN
Status: DISCONTINUED | OUTPATIENT
Start: 2021-05-16 | End: 2021-05-18 | Stop reason: HOSPADM

## 2021-05-16 RX ORDER — HYDROMORPHONE HCL 110MG/55ML
0.5 PATIENT CONTROLLED ANALGESIA SYRINGE INTRAVENOUS ONCE
Status: COMPLETED | OUTPATIENT
Start: 2021-05-16 | End: 2021-05-16

## 2021-05-16 RX ORDER — SENNA PLUS 8.6 MG/1
2 TABLET ORAL 2 TIMES DAILY
Status: DISCONTINUED | OUTPATIENT
Start: 2021-05-16 | End: 2021-05-18 | Stop reason: HOSPADM

## 2021-05-16 RX ADMIN — OXYCODONE 10 MG: 5 TABLET ORAL at 09:43

## 2021-05-16 RX ADMIN — FAMOTIDINE 20 MG: 20 TABLET ORAL at 08:35

## 2021-05-16 RX ADMIN — HYDROXYUREA 1000 MG: 500 CAPSULE ORAL at 20:08

## 2021-05-16 RX ADMIN — OXYCODONE HYDROCHLORIDE 10 MG: 5 TABLET ORAL at 18:58

## 2021-05-16 RX ADMIN — HYDROMORPHONE HYDROCHLORIDE 1 MG: 2 INJECTION, SOLUTION INTRAMUSCULAR; INTRAVENOUS; SUBCUTANEOUS at 05:36

## 2021-05-16 RX ADMIN — OXYCODONE 10 MG: 5 TABLET ORAL at 03:55

## 2021-05-16 RX ADMIN — FOLIC ACID 1 MG: 1 TABLET ORAL at 08:35

## 2021-05-16 RX ADMIN — SODIUM CHLORIDE: 9 INJECTION, SOLUTION INTRAVENOUS at 12:04

## 2021-05-16 RX ADMIN — HYDROMORPHONE HYDROCHLORIDE 1 MG: 2 INJECTION, SOLUTION INTRAMUSCULAR; INTRAVENOUS; SUBCUTANEOUS at 22:17

## 2021-05-16 RX ADMIN — HYDROMORPHONE HYDROCHLORIDE 0.5 MG: 2 INJECTION, SOLUTION INTRAMUSCULAR; INTRAVENOUS; SUBCUTANEOUS at 17:10

## 2021-05-16 RX ADMIN — HYDROMORPHONE HYDROCHLORIDE 1 MG: 2 INJECTION, SOLUTION INTRAMUSCULAR; INTRAVENOUS; SUBCUTANEOUS at 02:31

## 2021-05-16 RX ADMIN — HYDROMORPHONE HYDROCHLORIDE 1 MG: 2 INJECTION, SOLUTION INTRAMUSCULAR; INTRAVENOUS; SUBCUTANEOUS at 15:14

## 2021-05-16 RX ADMIN — HYDROMORPHONE HYDROCHLORIDE 1 MG: 2 INJECTION, SOLUTION INTRAMUSCULAR; INTRAVENOUS; SUBCUTANEOUS at 20:08

## 2021-05-16 RX ADMIN — HYDROXYUREA 1000 MG: 500 CAPSULE ORAL at 08:36

## 2021-05-16 RX ADMIN — HYDROMORPHONE HYDROCHLORIDE 1 MG: 2 INJECTION, SOLUTION INTRAMUSCULAR; INTRAVENOUS; SUBCUTANEOUS at 12:04

## 2021-05-16 RX ADMIN — OXYCODONE HYDROCHLORIDE 10 MG: 5 TABLET ORAL at 23:27

## 2021-05-16 RX ADMIN — OXYCODONE HYDROCHLORIDE 10 MG: 5 TABLET ORAL at 14:11

## 2021-05-16 RX ADMIN — HYDROMORPHONE HYDROCHLORIDE 1 MG: 2 INJECTION, SOLUTION INTRAMUSCULAR; INTRAVENOUS; SUBCUTANEOUS at 08:42

## 2021-05-16 ASSESSMENT — PAIN SCALES - GENERAL
PAINLEVEL_OUTOF10: 10
PAINLEVEL_OUTOF10: 7
PAINLEVEL_OUTOF10: 8
PAINLEVEL_OUTOF10: 7
PAINLEVEL_OUTOF10: 7

## 2021-05-16 NOTE — PROGRESS NOTES
Hospitalist Progress Note      PCP: Amrita Anderson MD    Date of Admission: 5/14/2021    Chief Complaint: Right shoulder right hip, left knee pain    Hospital Course: This 19-year-old male admitted with sickle cell crisis hematology oncology consulted and following    Subjective: Patient continues complain of her right shoulder right hip pain yet sleepy and comfortable. Denies any chest pain or shortness of breath no nausea vomiting      Medications:  Reviewed    Infusion Medications    sodium chloride 125 mL/hr at 05/15/21 1847     Scheduled Medications    folic acid  1 mg Oral Daily    famotidine  20 mg Oral BID    hydroxyurea  1,000 mg Oral BID    enoxaparin  40 mg Subcutaneous Daily     PRN Meds: oxyCODONE, oxyCODONE, HYDROmorphone      Intake/Output Summary (Last 24 hours) at 5/16/2021 1051  Last data filed at 5/16/2021 0539  Gross per 24 hour   Intake 3603 ml   Output 1700 ml   Net 1903 ml       Physical Exam Performed:    /80   Pulse 77   Temp 98.7 °F (37.1 °C) (Oral)   Resp 16   Ht 5' 8\" (1.727 m)   Wt 190 lb (86.2 kg)   SpO2 98%   BMI 28.89 kg/m²     General appearance: No apparent distress, appears stated age and cooperative. HEENT: Pupils equal, round, and reactive to light. Conjunctivae/corneas clear. Neck: Supple, with full range of motion. No jugular venous distention. Trachea midline. Respiratory:  Normal respiratory effort. Clear to auscultation, bilaterally without Rales/Wheezes/Rhonchi. Cardiovascular: Regular rate and rhythm with normal S1/S2 without murmurs, rubs or gallops. Abdomen: Soft, non-tender, non-distended with normal bowel sounds. Musculoskeletal: No clubbing, cyanosis or edema bilaterally. Full range of motion without deformity. Skin: Skin color, texture, turgor normal.  No rashes or lesions. Neurologic:  Neurovascularly intact without any focal sensory/motor deficits.  Cranial nerves: II-XII intact, grossly non-focal.  Psychiatric: Alert and Roddy Crowder MD

## 2021-05-16 NOTE — PROGRESS NOTES
Shift assessment completed. Pt A&Ox4, VSS. Pt c/o 8/10 joint pain, PRN pain medications given per mar, pt placed on 2L O2 for comfort. Denies any further needs at this time. Bed locked and in lowest position. Call light & bedside table are within reach.

## 2021-05-16 NOTE — CARE COORDINATION
CASE MANAGEMENT INITIAL ASSESSMENT      Reviewed chart and completed assessment with:patient  Explained Case Management role/services. Primary contact information:[address] [facility] [address] :   Primary Decision Maker: Viv Hayden - Parent, Legal Guardian - [phone]    Secondary Decision Maker: Miesha Candelario - Parent - [phone]          Can this person be reached and be able to respond quickly, such as within a few minutes or hours? Yes      Admit date/status:5/15/21  Diagnosis:sickle cell   Is this a Readmission?:  Yes  freq readmits for pain control    Insurance:[employer]   Precert required for SNF: Yes       3 night stay required: No    Living arrangements, Adls, care needs, prior to admission:lives in home with support of parents. Denied any DCP needs. Transportation:private     Durable Medical Equipment at home:  Walker__Cane__RTS__ BSC__Shower Chair__  02__ HHN__ CPAP__  BiPap__  Hospital Bed__ W/C___ Other__________    Services in the home and/or outpatient, prior to [address] (if applicable)   · Name:  · Address:  · Dialysis Schedule:  · Phone:  · Fax:    PT/OT recs:none    Hospital Exemption Notification (HEN):needed for SNF    Barriers to discharge:none    Plan/comments:spoke with patient. Reported remains IPTA. Denied any DCP needs. Parents at bedside.  Alicja Sosa RN       ECOC on chart for MD signature

## 2021-05-17 ENCOUNTER — APPOINTMENT (OUTPATIENT)
Dept: MRI IMAGING | Age: 21
DRG: 812 | End: 2021-05-17
Payer: COMMERCIAL

## 2021-05-17 LAB
BASOPHILS ABSOLUTE: 0.1 K/UL (ref 0–0.2)
BASOPHILS RELATIVE PERCENT: 0.5 %
EOSINOPHILS ABSOLUTE: 0.1 K/UL (ref 0–0.6)
EOSINOPHILS RELATIVE PERCENT: 0.5 %
HCT VFR BLD CALC: 21.6 % (ref 40.5–52.5)
HEMATOLOGY PATH CONSULT: NO
HEMOGLOBIN: 7.6 G/DL (ref 13.5–17.5)
LYMPHOCYTES ABSOLUTE: 2.2 K/UL (ref 1–5.1)
LYMPHOCYTES RELATIVE PERCENT: 14.5 %
MCH RBC QN AUTO: 34.4 PG (ref 26–34)
MCHC RBC AUTO-ENTMCNC: 35.1 G/DL (ref 31–36)
MCV RBC AUTO: 98 FL (ref 80–100)
MONOCYTES ABSOLUTE: 1.9 K/UL (ref 0–1.3)
MONOCYTES RELATIVE PERCENT: 12.3 %
NEUTROPHILS ABSOLUTE: 10.9 K/UL (ref 1.7–7.7)
NEUTROPHILS RELATIVE PERCENT: 72.2 %
PDW BLD-RTO: 18.5 % (ref 12.4–15.4)
PLATELET # BLD: 391 K/UL (ref 135–450)
PMV BLD AUTO: 8.4 FL (ref 5–10.5)
RBC # BLD: 2.21 M/UL (ref 4.2–5.9)
WBC # BLD: 15.1 K/UL (ref 4–11)

## 2021-05-17 PROCEDURE — 6370000000 HC RX 637 (ALT 250 FOR IP): Performed by: INTERNAL MEDICINE

## 2021-05-17 PROCEDURE — 73721 MRI JNT OF LWR EXTRE W/O DYE: CPT

## 2021-05-17 PROCEDURE — 6370000000 HC RX 637 (ALT 250 FOR IP): Performed by: HOSPITALIST

## 2021-05-17 PROCEDURE — 6360000002 HC RX W HCPCS: Performed by: NURSE PRACTITIONER

## 2021-05-17 PROCEDURE — 2580000003 HC RX 258: Performed by: HOSPITALIST

## 2021-05-17 PROCEDURE — 36415 COLL VENOUS BLD VENIPUNCTURE: CPT

## 2021-05-17 PROCEDURE — 73221 MRI JOINT UPR EXTREM W/O DYE: CPT

## 2021-05-17 PROCEDURE — 85025 COMPLETE CBC W/AUTO DIFF WBC: CPT

## 2021-05-17 PROCEDURE — 1200000000 HC SEMI PRIVATE

## 2021-05-17 RX ADMIN — HYDROMORPHONE HYDROCHLORIDE 1 MG: 2 INJECTION, SOLUTION INTRAMUSCULAR; INTRAVENOUS; SUBCUTANEOUS at 20:36

## 2021-05-17 RX ADMIN — FAMOTIDINE 20 MG: 20 TABLET ORAL at 08:38

## 2021-05-17 RX ADMIN — SENNOSIDES 17.2 MG: 8.6 TABLET, FILM COATED ORAL at 20:35

## 2021-05-17 RX ADMIN — SODIUM CHLORIDE: 9 INJECTION, SOLUTION INTRAVENOUS at 23:53

## 2021-05-17 RX ADMIN — HYDROXYUREA 1000 MG: 500 CAPSULE ORAL at 21:57

## 2021-05-17 RX ADMIN — SODIUM CHLORIDE: 9 INJECTION, SOLUTION INTRAVENOUS at 06:22

## 2021-05-17 RX ADMIN — OXYCODONE HYDROCHLORIDE 10 MG: 5 TABLET ORAL at 22:48

## 2021-05-17 RX ADMIN — HYDROMORPHONE HYDROCHLORIDE 1 MG: 2 INJECTION, SOLUTION INTRAMUSCULAR; INTRAVENOUS; SUBCUTANEOUS at 04:37

## 2021-05-17 RX ADMIN — HYDROMORPHONE HYDROCHLORIDE 1 MG: 2 INJECTION, SOLUTION INTRAMUSCULAR; INTRAVENOUS; SUBCUTANEOUS at 12:27

## 2021-05-17 RX ADMIN — SENNOSIDES 17.2 MG: 8.6 TABLET, FILM COATED ORAL at 08:38

## 2021-05-17 RX ADMIN — HYDROMORPHONE HYDROCHLORIDE 1 MG: 2 INJECTION, SOLUTION INTRAMUSCULAR; INTRAVENOUS; SUBCUTANEOUS at 10:28

## 2021-05-17 RX ADMIN — HYDROMORPHONE HYDROCHLORIDE 1 MG: 2 INJECTION, SOLUTION INTRAMUSCULAR; INTRAVENOUS; SUBCUTANEOUS at 14:28

## 2021-05-17 RX ADMIN — HYDROMORPHONE HYDROCHLORIDE 1 MG: 2 INJECTION, SOLUTION INTRAMUSCULAR; INTRAVENOUS; SUBCUTANEOUS at 18:25

## 2021-05-17 RX ADMIN — HYDROMORPHONE HYDROCHLORIDE 1 MG: 2 INJECTION, SOLUTION INTRAMUSCULAR; INTRAVENOUS; SUBCUTANEOUS at 23:49

## 2021-05-17 RX ADMIN — HYDROXYUREA 1000 MG: 500 CAPSULE ORAL at 08:42

## 2021-05-17 RX ADMIN — HYDROMORPHONE HYDROCHLORIDE 1 MG: 2 INJECTION, SOLUTION INTRAMUSCULAR; INTRAVENOUS; SUBCUTANEOUS at 02:28

## 2021-05-17 RX ADMIN — HYDROMORPHONE HYDROCHLORIDE 1 MG: 2 INJECTION, SOLUTION INTRAMUSCULAR; INTRAVENOUS; SUBCUTANEOUS at 16:28

## 2021-05-17 RX ADMIN — HYDROMORPHONE HYDROCHLORIDE 1 MG: 2 INJECTION, SOLUTION INTRAMUSCULAR; INTRAVENOUS; SUBCUTANEOUS at 06:32

## 2021-05-17 RX ADMIN — OXYCODONE HYDROCHLORIDE 10 MG: 5 TABLET ORAL at 15:35

## 2021-05-17 RX ADMIN — OXYCODONE HYDROCHLORIDE 10 MG: 5 TABLET ORAL at 11:35

## 2021-05-17 RX ADMIN — FOLIC ACID 1 MG: 1 TABLET ORAL at 08:38

## 2021-05-17 RX ADMIN — OXYCODONE HYDROCHLORIDE 10 MG: 5 TABLET ORAL at 05:27

## 2021-05-17 RX ADMIN — HYDROMORPHONE HYDROCHLORIDE 1 MG: 2 INJECTION, SOLUTION INTRAMUSCULAR; INTRAVENOUS; SUBCUTANEOUS at 08:29

## 2021-05-17 RX ADMIN — FAMOTIDINE 20 MG: 20 TABLET ORAL at 20:35

## 2021-05-17 RX ADMIN — HYDROMORPHONE HYDROCHLORIDE 1 MG: 2 INJECTION, SOLUTION INTRAMUSCULAR; INTRAVENOUS; SUBCUTANEOUS at 00:17

## 2021-05-17 ASSESSMENT — PAIN SCALES - GENERAL
PAINLEVEL_OUTOF10: 8
PAINLEVEL_OUTOF10: 7
PAINLEVEL_OUTOF10: 9
PAINLEVEL_OUTOF10: 8
PAINLEVEL_OUTOF10: 7
PAINLEVEL_OUTOF10: 7
PAINLEVEL_OUTOF10: 8

## 2021-05-17 ASSESSMENT — PAIN DESCRIPTION - ONSET
ONSET: ON-GOING
ONSET: ON-GOING

## 2021-05-17 ASSESSMENT — PAIN DESCRIPTION - PAIN TYPE: TYPE: ACUTE PAIN

## 2021-05-17 ASSESSMENT — PAIN DESCRIPTION - ORIENTATION
ORIENTATION: RIGHT

## 2021-05-17 ASSESSMENT — PAIN DESCRIPTION - LOCATION
LOCATION: SHOULDER
LOCATION: SHOULDER

## 2021-05-17 ASSESSMENT — PAIN DESCRIPTION - PROGRESSION: CLINICAL_PROGRESSION: NOT CHANGED

## 2021-05-17 ASSESSMENT — PAIN DESCRIPTION - FREQUENCY
FREQUENCY: CONTINUOUS
FREQUENCY: CONTINUOUS

## 2021-05-17 ASSESSMENT — PAIN - FUNCTIONAL ASSESSMENT
PAIN_FUNCTIONAL_ASSESSMENT: PREVENTS OR INTERFERES SOME ACTIVE ACTIVITIES AND ADLS
PAIN_FUNCTIONAL_ASSESSMENT: ACTIVITIES ARE NOT PREVENTED

## 2021-05-17 NOTE — PROGRESS NOTES
Pt A&Ox4. VSS. Shift assessment completed. Still complains of right hip and shoulder pain. Pain medication administered per orders. Call light within reach, bed in lowest position, wheels locked.

## 2021-05-17 NOTE — PROGRESS NOTES
Hospitalist Progress Note      PCP: Eugene Marcano MD    Date of Admission: 5/14/2021    Chief Complaint: Right shoulder right hip, left knee pain    Hospital Course: This 26-year-old male admitted with sickle cell crisis      Subjective:  Pain continues. He doesn't feel much better. He does think he will go home tomorrow, however. Medications:  Reviewed    Infusion Medications    sodium chloride 125 mL/hr at 05/17/21 2489     Scheduled Medications    senna  2 tablet Oral BID    folic acid  1 mg Oral Daily    famotidine  20 mg Oral BID    hydroxyurea  1,000 mg Oral BID    enoxaparin  40 mg Subcutaneous Daily     PRN Meds: oxyCODONE, HYDROmorphone, oxyCODONE      Intake/Output Summary (Last 24 hours) at 5/17/2021 0828  Last data filed at 5/17/2021 0442  Gross per 24 hour   Intake 2449 ml   Output 2425 ml   Net 24 ml       Physical Exam Performed:    /68   Pulse 68   Temp 98 °F (36.7 °C) (Oral)   Resp 14   Ht 5' 8\" (1.727 m)   Wt 190 lb (86.2 kg)   SpO2 95%   BMI 28.89 kg/m²     General appearance: No apparent distress, appears stated age. HEENT: Pupils equal, round, and reactive to light. Conjunctivae/corneas clear. Neck: Supple, with full range of motion. No jugular venous distention. Trachea midline. Respiratory:  Normal respiratory effort. Clear to auscultation, bilaterally without Rales/Wheezes/Rhonchi. Cardiovascular: Regular rate and rhythm with normal S1/S2 without murmurs, rubs or gallops. Abdomen: Soft, non-tender, non-distended with normal bowel sounds. Musculoskeletal: No clubbing, cyanosis or edema bilaterally. Full range of motion without deformity. Skin: Skin color, texture, turgor normal.  No rashes or lesions. Neurologic:  Neurovascularly intact without any focal sensory/motor deficits. Cranial nerves: II-XII intact, grossly non-focal.  Psychiatric: Alert and oriented, thought content appropriate, normal insight. Somnolent, lethargic.    Capillary indicated    Dispo - patient thinks he might leave 5/18, will evaluate daily for discharge. He lives at home.        Nate Mas MD

## 2021-05-17 NOTE — PROGRESS NOTES
Message sent to Dr. Kaia Shafer: \"Here with sickle cell crisis. MRI's of right hip, shoulder, brachial plexus ordered with contrast. MRI tech called stating its contraindicative of people with sickle cell to get contrast. Do you still want the contrast? If not can you please change all the MRI orders to ones without contrast or I can change. Thanks.  \"

## 2021-05-17 NOTE — PROGRESS NOTES
ONCOLOGY HEMATOLOGY CARE PROGRESS NOTE      SUBJECTIVE:     Patient is resting comfortably this morning. He did state that his biggest problem is right shoulder pain. ROS:     Constitutional:  No weight loss, No fever, No chills, No night sweats. Energy level good. Eyes:  No impairment or change in vision  ENT / Mouth:  No pain, abnormal ulceration, bleeding, nasal drip or change in voice or hearing  Cardiovascular:  No chest pain, palpitations, new edema, or calf discomfort  Respiratory:  No pain, hemoptysis, change to breathing  Breast:  No pain, discharge, change in appearance or texture  Gastrointestinal:  No pain, cramping, jaundice, change to eating and bowel habits  Urinary:  No pain, bleeding or change in continence  Genitalia: No pain, bleeding or discharge  Musculoskeletal:  See above  Skin:  No pruritus, rash, change to nodules or lesions  Neurologic:  No discomfort, change in mental status, speech, sensory or motor activity  Psychiatric:  No change in concentration or change to affect or mood  Endocrine:  No hot flashes, increased thirst, or change to urine production  Hematologic: No petechiae, ecchymosis or bleeding  Lymphatic:  No lymphadenopathy or lymphedema  Allergy / Immunologic:  No eczema, hives, frequent or recurrent infections    OBJECTIVE        Physical    VITALS:  /68   Pulse 68   Temp 98 °F (36.7 °C) (Oral)   Resp 14   Ht 5' 8\" (1.727 m)   Wt 190 lb (86.2 kg)   SpO2 95%   BMI 28.89 kg/m²   TEMPERATURE:  Current - Temp: 98 °F (36.7 °C);  Max - Temp  Av.5 °F (36.9 °C)  Min: 98 °F (36.7 °C)  Max: 99 °F (37.2 °C)  PULSE OXIMETRY RANGE: SpO2  Av.8 %  Min: 95 %  Max: 96 %  24HR INTAKE/OUTPUT:      Intake/Output Summary (Last 24 hours) at 2021 0811  Last data filed at 2021 0442  Gross per 24 hour   Intake 2449 ml   Output 2425 ml   Net 24 ml       CONSTITUTIONAL: awake, alert, cooperative, no apparent distress EYES: pupils equal, round and reactive to light, sclera clear and conjunctiva normal  ENT: Normocephalic, without obvious abnormality, atraumatic  NECK: supple, symmetrical, no jugular venous distension and no carotid bruits   HEMATOLOGIC/LYMPHATIC: no cervical, supraclavicular or axillary lymphadenopathy   LUNGS: no increased work of breathing and clear to auscultation   CARDIOVASCULAR: regular rate and rhythm, normal S1 and S2, no murmur noted  ABDOMEN: normal bowel sounds x 4, soft, non-distended, non-tender, no masses palpated, no hepatosplenomgaly   MUSCULOSKELETAL: full range of motion noted, tone is normal  NEUROLOGIC: awake, alert, oriented to name, place and time. Motor skills grossly intact. SKIN: Normal skin color, texture, turgor and no jaundice.  appears intact   EXTREMITIES: no LE edema       Data      Recent Labs     05/14/21 2355 05/16/21  0650 05/16/21  2248   WBC 22.6* 14.4*  --    HGB 9.0* 7.8* 7.6*   HCT 26.5* 22.9* 22.1*   * 439  --    .0 99.3  --         Recent Labs     05/14/21  2355 05/16/21  0650    138   K 4.1 3.7    106   CO2 23 23   BUN 9 9   CREATININE 0.7* 0.7*     Recent Labs     05/14/21 2355   AST 54*   ALT 49*   BILITOT 4.4*   ALKPHOS 102       Magnesium:    Lab Results   Component Value Date    MG 1.80 05/05/2021    MG 1.80 05/04/2021    MG 1.90 01/12/2021         Problem List  Patient Active Problem List   Diagnosis    [facility] crisis [facility] [address]    Asthma    Sickle cell crisis ([facility] [address]    Sepsis ([facility] [address]    Opiate overdose ([facility] [address]    Opiate antagonist poisoning, accidental or unintentional, initial encounter    Leukocytosis    Hypoxia    Anemia    Other chest pain    Pneumonia due to infectious organism    Fever    Chronic prescription opiate use       ASSESSMENT AND PLAN:    Sickle cell crisis:  -He is on Hydrea 1000 mg p.o. twice daily  -Folic acid 1 mg daily  -Crizanlizumab 5 mg/kg every 3 weeks  -He missed his dose on 5/14    -His pain

## 2021-05-18 VITALS
DIASTOLIC BLOOD PRESSURE: 65 MMHG | BODY MASS INDEX: 28.79 KG/M2 | HEART RATE: 78 BPM | HEIGHT: 68 IN | SYSTOLIC BLOOD PRESSURE: 109 MMHG | TEMPERATURE: 97.5 F | RESPIRATION RATE: 16 BRPM | OXYGEN SATURATION: 94 % | WEIGHT: 190 LBS

## 2021-05-18 LAB
ANION GAP SERPL CALCULATED.3IONS-SCNC: 7 MMOL/L (ref 3–16)
ANISOCYTOSIS: ABNORMAL
BASOPHILIC STIPPLING: ABNORMAL
BASOPHILS ABSOLUTE: 0.3 K/UL (ref 0–0.2)
BASOPHILS RELATIVE PERCENT: 2 %
BUN BLDV-MCNC: 6 MG/DL (ref 7–20)
CALCIUM SERPL-MCNC: 9 MG/DL (ref 8.3–10.6)
CHLORIDE BLD-SCNC: 108 MMOL/L (ref 99–110)
CO2: 26 MMOL/L (ref 21–32)
CREAT SERPL-MCNC: 0.6 MG/DL (ref 0.9–1.3)
EOSINOPHILS ABSOLUTE: 0.3 K/UL (ref 0–0.6)
EOSINOPHILS RELATIVE PERCENT: 2 %
GFR AFRICAN AMERICAN: >60
GFR NON-AFRICAN AMERICAN: >60
GLUCOSE BLD-MCNC: 92 MG/DL (ref 70–99)
HCT VFR BLD CALC: 21.8 % (ref 40.5–52.5)
HEMATOLOGY PATH CONSULT: NORMAL
HEMATOLOGY PATH CONSULT: YES
HEMOGLOBIN: 7.6 G/DL (ref 13.5–17.5)
HOWELL-JOLLY BODIES: ABNORMAL
HYPOCHROMIA: ABNORMAL
LYMPHOCYTES ABSOLUTE: 4.4 K/UL (ref 1–5.1)
LYMPHOCYTES RELATIVE PERCENT: 26 %
MACROCYTES: ABNORMAL
MCH RBC QN AUTO: 34 PG (ref 26–34)
MCHC RBC AUTO-ENTMCNC: 34.8 G/DL (ref 31–36)
MCV RBC AUTO: 97.8 FL (ref 80–100)
MONOCYTES ABSOLUTE: 2.7 K/UL (ref 0–1.3)
MONOCYTES RELATIVE PERCENT: 16 %
NEUTROPHILS ABSOLUTE: 9.1 K/UL (ref 1.7–7.7)
NEUTROPHILS RELATIVE PERCENT: 54 %
OVALOCYTES: ABNORMAL
PDW BLD-RTO: 19.9 % (ref 12.4–15.4)
PLATELET # BLD: 411 K/UL (ref 135–450)
PMV BLD AUTO: 8.6 FL (ref 5–10.5)
POIKILOCYTES: ABNORMAL
POLYCHROMASIA: ABNORMAL
POTASSIUM REFLEX MAGNESIUM: 3.6 MMOL/L (ref 3.5–5.1)
RBC # BLD: 2.23 M/UL (ref 4.2–5.9)
SICKLE CELLS: ABNORMAL
SODIUM BLD-SCNC: 141 MMOL/L (ref 136–145)
TARGET CELLS: ABNORMAL
WBC # BLD: 16.9 K/UL (ref 4–11)

## 2021-05-18 PROCEDURE — 6360000002 HC RX W HCPCS: Performed by: NURSE PRACTITIONER

## 2021-05-18 PROCEDURE — 6370000000 HC RX 637 (ALT 250 FOR IP): Performed by: HOSPITALIST

## 2021-05-18 PROCEDURE — 85025 COMPLETE CBC W/AUTO DIFF WBC: CPT

## 2021-05-18 PROCEDURE — 36415 COLL VENOUS BLD VENIPUNCTURE: CPT

## 2021-05-18 PROCEDURE — 6370000000 HC RX 637 (ALT 250 FOR IP): Performed by: INTERNAL MEDICINE

## 2021-05-18 PROCEDURE — 80048 BASIC METABOLIC PNL TOTAL CA: CPT

## 2021-05-18 RX ADMIN — HYDROMORPHONE HYDROCHLORIDE 1 MG: 2 INJECTION, SOLUTION INTRAMUSCULAR; INTRAVENOUS; SUBCUTANEOUS at 08:25

## 2021-05-18 RX ADMIN — OXYCODONE HYDROCHLORIDE 10 MG: 5 TABLET ORAL at 02:48

## 2021-05-18 RX ADMIN — HYDROMORPHONE HYDROCHLORIDE 1 MG: 2 INJECTION, SOLUTION INTRAMUSCULAR; INTRAVENOUS; SUBCUTANEOUS at 06:01

## 2021-05-18 RX ADMIN — HYDROXYUREA 1000 MG: 500 CAPSULE ORAL at 08:25

## 2021-05-18 RX ADMIN — HYDROMORPHONE HYDROCHLORIDE 1 MG: 2 INJECTION, SOLUTION INTRAMUSCULAR; INTRAVENOUS; SUBCUTANEOUS at 01:50

## 2021-05-18 RX ADMIN — FAMOTIDINE 20 MG: 20 TABLET ORAL at 08:25

## 2021-05-18 RX ADMIN — SENNOSIDES 17.2 MG: 8.6 TABLET, FILM COATED ORAL at 08:25

## 2021-05-18 RX ADMIN — FOLIC ACID 1 MG: 1 TABLET ORAL at 08:24

## 2021-05-18 RX ADMIN — HYDROMORPHONE HYDROCHLORIDE 1 MG: 2 INJECTION, SOLUTION INTRAMUSCULAR; INTRAVENOUS; SUBCUTANEOUS at 03:50

## 2021-05-18 RX ADMIN — OXYCODONE HYDROCHLORIDE 10 MG: 5 TABLET ORAL at 12:39

## 2021-05-18 ASSESSMENT — PAIN DESCRIPTION - LOCATION
LOCATION: SHOULDER
LOCATION: SHOULDER

## 2021-05-18 ASSESSMENT — PAIN DESCRIPTION - PROGRESSION
CLINICAL_PROGRESSION: NOT CHANGED

## 2021-05-18 ASSESSMENT — PAIN DESCRIPTION - DESCRIPTORS
DESCRIPTORS: ACHING;CONSTANT;SHOOTING
DESCRIPTORS: ACHING;CONSTANT;SHARP

## 2021-05-18 ASSESSMENT — PAIN DESCRIPTION - ORIENTATION
ORIENTATION: RIGHT

## 2021-05-18 ASSESSMENT — PAIN SCALES - GENERAL
PAINLEVEL_OUTOF10: 6
PAINLEVEL_OUTOF10: 6
PAINLEVEL_OUTOF10: 8
PAINLEVEL_OUTOF10: 5
PAINLEVEL_OUTOF10: 7
PAINLEVEL_OUTOF10: 7

## 2021-05-18 ASSESSMENT — PAIN DESCRIPTION - ONSET
ONSET: ON-GOING
ONSET: ON-GOING

## 2021-05-18 ASSESSMENT — PAIN DESCRIPTION - PAIN TYPE
TYPE: ACUTE PAIN
TYPE: ACUTE PAIN

## 2021-05-18 ASSESSMENT — PAIN DESCRIPTION - FREQUENCY
FREQUENCY: CONTINUOUS

## 2021-05-18 ASSESSMENT — PAIN - FUNCTIONAL ASSESSMENT: PAIN_FUNCTIONAL_ASSESSMENT: PREVENTS OR INTERFERES SOME ACTIVE ACTIVITIES AND ADLS

## 2021-05-18 NOTE — PLAN OF CARE
Pain is currently being managed with PO and IV medications, see MAR. Pain continues to be monitored frequently.

## 2021-05-18 NOTE — DISCHARGE SUMMARY
No rashes or lesions. Neurologic:  Neurovascularly intact without any focal sensory/motor deficits. Cranial nerves: II-XII intact, grossly non-focal.  Psychiatric:  Alert and oriented, thought content appropriate, normal insight. Flat affect. Capillary Refill: Brisk,< 3 seconds   Peripheral Pulses: +2 palpable, equal bilaterally       Labs: For convenience and continuity at follow-up the following most recent labs are provided:      CBC:    Lab Results   Component Value Date    WBC 16.9 05/18/2021    HGB 7.6 05/18/2021    HCT 21.8 05/18/2021     05/18/2021       Renal:    Lab Results   Component Value Date     05/18/2021    K 3.6 05/18/2021     05/18/2021    CO2 26 05/18/2021    BUN 6 05/18/2021    CREATININE 0.6 05/18/2021    CALCIUM 9.0 05/18/2021         Significant Diagnostic Studies    Radiology:   MRI HIP RIGHT WO CONTRAST   Final Result   1. No avascular necrosis, acute osseous abnormality, or other explanation for   the patient's pain. 2. Small volume of free fluid in the pelvis of uncertain clinical   significance in a male patient. MRI SHOULDER RIGHT WO CONTRAST   Final Result   1. Foci of subchondral edema in the humeral head possibly representing   avascular necrosis. No subchondral collapse. Alternative potential   etiologies include degenerative subchondral edema and contusions. 2. No rotator cuff or labral tear. XR CHEST PORTABLE   Final Result   Clear lungs. Cardiomegaly. No acute cardiopulmonary abnormality. Consults:     IP CONSULT TO ONCOLOGY    Disposition:  home     Condition at Discharge: Stable    Discharge Instructions/Follow-up:  Follow up with hematology per their instructions.      Code Status:  Full Code     Activity: activity as tolerated    Diet: encourage fluids      Discharge Medications:     Current Discharge Medication List           Details   oxyCODONE (OXYCONTIN) 20 MG extended release tablet TAKE 1 TABLET BY MOUTH EVERY 12 HOURS. TAKE WITH ENOUGH WATER TO SWALLOW WHOLE. DO NOT CRUSH/DISSOLVE/CHEW/CUT/BREAK. oxyCODONE 5 MG capsule Take 10 mg by mouth every 4 hours as needed for Pain.      hydroxyurea (HYDREA) 500 MG chemo capsule Take 2 capsules by mouth 2 times daily  Qty:                 Time Spent on discharge is more than 30 minutes in the examination, evaluation, counseling and review of medications and discharge plan. Signed:    Mary Oviedo MD   5/18/2021      Thank you Carmen Santamaria MD for the opportunity to be involved in this patient's care. If you have any questions or concerns please feel free to contact me at [phone].

## 2021-05-18 NOTE — PROGRESS NOTES
Perfect serve sent to Dr. Camilo Romero stating \"Patient is requesting IV dilaudid versus PO oxycodone. Per the order the PO is to be given first and if ineffective IV dilaudid should then be given no more than one hour after. Is it okay to just administer the IV didlaudid, patient reports the oxycodone is not working to manage his pain. \" Response \"Please just follow the orders already in effect. \" Patient is aware and medication has been administered per order.

## 2021-05-18 NOTE — PROGRESS NOTES
ONCOLOGY HEMATOLOGY CARE PROGRESS NOTE      SUBJECTIVE:     The patient states that he feels better and would like to go home today. ROS:     Constitutional:  No weight loss, No fever, No chills, No night sweats. Energy level good. Eyes:  No impairment or change in vision  ENT / Mouth:  No pain, abnormal ulceration, bleeding, nasal drip or change in voice or hearing  Cardiovascular:  No chest pain, palpitations, new edema, or calf discomfort  Respiratory:  No pain, hemoptysis, change to breathing  Breast:  No pain, discharge, change in appearance or texture  Gastrointestinal:  No pain, cramping, jaundice, change to eating and bowel habits  Urinary:  No pain, bleeding or change in continence  Genitalia: No pain, bleeding or discharge  Musculoskeletal:  See above  Skin:  No pruritus, rash, change to nodules or lesions  Neurologic:  No discomfort, change in mental status, speech, sensory or motor activity  Psychiatric:  No change in concentration or change to affect or mood  Endocrine:  No hot flashes, increased thirst, or change to urine production  Hematologic: No petechiae, ecchymosis or bleeding  Lymphatic:  No lymphadenopathy or lymphedema  Allergy / Immunologic:  No eczema, hives, frequent or recurrent infections    OBJECTIVE        Physical    VITALS:  /70   Pulse 78   Temp 98.8 °F (37.1 °C) (Oral)   Resp 16   Ht 5' 8\" (1.727 m)   Wt 190 lb (86.2 kg)   SpO2 96%   BMI 28.89 kg/m²   TEMPERATURE:  Current - Temp: 98.8 °F (37.1 °C);  Max - Temp  Av.8 °F (37.1 °C)  Min: 98.6 °F (37 °C)  Max: 99 °F (37.2 °C)  PULSE OXIMETRY RANGE: SpO2  Av %  Min: 96 %  Max: 99 %  24HR INTAKE/OUTPUT:      Intake/Output Summary (Last 24 hours) at 2021 0904  Last data filed at 2021 0604  Gross per 24 hour   Intake 3575 ml   Output 3000 ml   Net 575 ml       CONSTITUTIONAL: awake, alert, cooperative, no apparent distress   EYES: pupils equal, round and reactive to light, sclera clear and conjunctiva normal  ENT: Normocephalic, without obvious abnormality, atraumatic  NECK: supple, symmetrical, no jugular venous distension and no carotid bruits   HEMATOLOGIC/LYMPHATIC: no cervical, supraclavicular or axillary lymphadenopathy   LUNGS: no increased work of breathing and clear to auscultation   CARDIOVASCULAR: regular rate and rhythm, normal S1 and S2, no murmur noted  ABDOMEN: normal bowel sounds x 4, soft, non-distended, non-tender, no masses palpated, no hepatosplenomgaly   MUSCULOSKELETAL: full range of motion noted, tone is normal  NEUROLOGIC: awake, alert, oriented to name, place and time. Motor skills grossly intact. SKIN: Normal skin color, texture, turgor and no jaundice. appears intact   EXTREMITIES: no LE edema       Data      Recent Labs     05/16/21  0650 05/16/21  2248 05/17/21  1022 05/18/21  0532   WBC 14.4*  --  15.1* 16.9*   HGB 7.8* 7.6* 7.6* 7.6*   HCT 22.9* 22.1* 21.6* 21.8*     --  391 411   MCV 99.3  --  98.0 97.8        Recent Labs     05/16/21  0650 05/18/21  0532    141   K 3.7 3.6    108   CO2 23 26   BUN 9 6*   CREATININE 0.7* 0.6*     No results for input(s): AST, ALT, ALB, BILIDIR, BILITOT, ALKPHOS in the last 72 hours.     Magnesium:    Lab Results   Component Value Date    MG 1.80 05/05/2021    MG 1.80 05/04/2021    MG 1.90 01/12/2021         Problem List  Patient Active Problem List   Diagnosis    [facility] [address]    Asthma    Sickle cell crisis ([facility] [address]    Sepsis ([facility] [address]    Opiate overdose ([facility] [address]    Opiate antagonist poisoning, accidental or unintentional, initial encounter    Leukocytosis    Hypoxia    Anemia    Other chest pain    Pneumonia due to infectious organism    Fever    Chronic prescription opiate use       ASSESSMENT AND PLAN:    Sickle cell crisis:  -He is on Hydrea 1000 mg p.o. twice daily  -Folic acid 1 mg daily  -Crizanlizumab 5 mg/kg every 3 weeks  -He missed his dose on 5/14    -His pain crisis have not been well controlled  -He has been in the hospital repetitively  -Hemoglobin electrophoresis is being checked, but I suspect he is not having a good response with respect to hemoglobin F  -If this is the case, his Hydrea will be stopped  -We could try him on Laymon Median, but he is not overly compliant  -This may be the same problem with Hydrea    Pain:  -This appears to be relatively well controlled  -MRI of his shoulder may have shown a small amount of avascular necrosis, but nothing that likely need surgery.   -MRI of the hip does not show avascular necrosis  -He does not feel that OxyContin is beneficial and we may need to increase his oxycodone as an outpatient    DVT prophylaxis enoxaparin 40 mg subcu daily    Elevated white blood count:  -Likely due to sickle cell crisis    Anemia:  -His hemoglobin usually runs around 9  -We will continue to monitor this as an outpatient  -We may need to start him on a transfusion protocol        ONCOLOGIC DISPOSITION:    -The patient states he feels that he can go home today    Jeremy Rosas MD  Please contact through [facility]

## 2021-05-18 NOTE — PROGRESS NOTES
Removed IV, Reviewed D/C instructions with pt. Pt asked about a script for pain meds. He said that Dr. Mukund Hamilton mentioned that he was going to change his pain meds. Paged Dr. Mukund Hamilton to clarify. Pt had to leave before we received a return call. Call pt and let him know that Teodoro Fournier said he would send it to the pts pharmacy by the end of the day.

## 2021-05-19 LAB
BLOOD CULTURE, ROUTINE: NORMAL
CULTURE, BLOOD 2: NORMAL

## 2021-05-22 ENCOUNTER — APPOINTMENT (OUTPATIENT)
Dept: GENERAL RADIOLOGY | Age: 21
DRG: 812 | End: 2021-05-22
Payer: COMMERCIAL

## 2021-05-22 ENCOUNTER — HOSPITAL ENCOUNTER (INPATIENT)
Age: 21
LOS: 3 days | Discharge: HOME | DRG: 812 | End: 2021-05-25
Attending: EMERGENCY MEDICINE | Admitting: INTERNAL MEDICINE
Payer: COMMERCIAL

## 2021-05-22 DIAGNOSIS — D57.00 SICKLE CELL PAIN CRISIS (HCC): Primary | ICD-10-CM

## 2021-05-22 LAB
A/G RATIO: 1.3 (ref 1.1–2.2)
ALBUMIN SERPL-MCNC: 4.5 G/DL (ref 3.4–5)
ALP BLD-CCNC: 96 U/L (ref 40–129)
ALT SERPL-CCNC: 18 U/L (ref 10–40)
ANION GAP SERPL CALCULATED.3IONS-SCNC: 12 MMOL/L (ref 3–16)
AST SERPL-CCNC: 38 U/L (ref 15–37)
BASOPHILS ABSOLUTE: 0.1 K/UL (ref 0–0.2)
BASOPHILS RELATIVE PERCENT: 1 %
BILIRUB SERPL-MCNC: 4.2 MG/DL (ref 0–1)
BUN BLDV-MCNC: 7 MG/DL (ref 7–20)
CALCIUM SERPL-MCNC: 9.9 MG/DL (ref 8.3–10.6)
CHLORIDE BLD-SCNC: 102 MMOL/L (ref 99–110)
CO2: 24 MMOL/L (ref 21–32)
CREAT SERPL-MCNC: 0.7 MG/DL (ref 0.9–1.3)
EOSINOPHILS ABSOLUTE: 0 K/UL (ref 0–0.6)
EOSINOPHILS RELATIVE PERCENT: 0.2 %
GFR AFRICAN AMERICAN: >60
GFR NON-AFRICAN AMERICAN: >60
GLOBULIN: 3.4 G/DL
GLUCOSE BLD-MCNC: 102 MG/DL (ref 70–99)
HCT VFR BLD CALC: 25.8 % (ref 40.5–52.5)
HEMOGLOBIN: 9 G/DL (ref 13.5–17.5)
IMMATURE RETIC FRACT: 0.55 (ref 0.21–0.37)
LYMPHOCYTES ABSOLUTE: 1.1 K/UL (ref 1–5.1)
LYMPHOCYTES RELATIVE PERCENT: 8.1 %
MCH RBC QN AUTO: 36.2 PG (ref 26–34)
MCHC RBC AUTO-ENTMCNC: 35 G/DL (ref 31–36)
MCV RBC AUTO: 103.5 FL (ref 80–100)
MONOCYTES ABSOLUTE: 1 K/UL (ref 0–1.3)
MONOCYTES RELATIVE PERCENT: 6.9 %
NEUTROPHILS ABSOLUTE: 11.7 K/UL (ref 1.7–7.7)
NEUTROPHILS RELATIVE PERCENT: 83.8 %
PDW BLD-RTO: 22.7 % (ref 12.4–15.4)
PLATELET # BLD: 536 K/UL (ref 135–450)
PMV BLD AUTO: 8.8 FL (ref 5–10.5)
POTASSIUM REFLEX MAGNESIUM: 3.9 MMOL/L (ref 3.5–5.1)
RBC # BLD: 2.49 M/UL (ref 4.2–5.9)
RETICULOCYTE ABSOLUTE COUNT: 0.22 M/UL
RETICULOCYTE COUNT PCT: 8.86 % (ref 0.5–2.18)
SODIUM BLD-SCNC: 138 MMOL/L (ref 136–145)
TOTAL PROTEIN: 7.9 G/DL (ref 6.4–8.2)
WBC # BLD: 13.9 K/UL (ref 4–11)

## 2021-05-22 PROCEDURE — 85025 COMPLETE CBC W/AUTO DIFF WBC: CPT

## 2021-05-22 PROCEDURE — 99284 EMERGENCY DEPT VISIT MOD MDM: CPT

## 2021-05-22 PROCEDURE — 6360000002 HC RX W HCPCS: Performed by: PHYSICIAN ASSISTANT

## 2021-05-22 PROCEDURE — 80053 COMPREHEN METABOLIC PANEL: CPT

## 2021-05-22 PROCEDURE — 71045 X-RAY EXAM CHEST 1 VIEW: CPT

## 2021-05-22 PROCEDURE — 96374 THER/PROPH/DIAG INJ IV PUSH: CPT

## 2021-05-22 PROCEDURE — 1200000000 HC SEMI PRIVATE

## 2021-05-22 PROCEDURE — 2580000003 HC RX 258: Performed by: PHYSICIAN ASSISTANT

## 2021-05-22 PROCEDURE — 96376 TX/PRO/DX INJ SAME DRUG ADON: CPT

## 2021-05-22 PROCEDURE — 85045 AUTOMATED RETICULOCYTE COUNT: CPT

## 2021-05-22 RX ORDER — SENNA PLUS 8.6 MG/1
1 TABLET ORAL NIGHTLY
Status: DISCONTINUED | OUTPATIENT
Start: 2021-05-23 | End: 2021-05-25 | Stop reason: HOSPADM

## 2021-05-22 RX ORDER — OXYCODONE HCL 20 MG/1
20 TABLET, FILM COATED, EXTENDED RELEASE ORAL EVERY 12 HOURS SCHEDULED
Status: DISCONTINUED | OUTPATIENT
Start: 2021-05-23 | End: 2021-05-25 | Stop reason: HOSPADM

## 2021-05-22 RX ORDER — 0.9 % SODIUM CHLORIDE 0.9 %
1000 INTRAVENOUS SOLUTION INTRAVENOUS ONCE
Status: COMPLETED | OUTPATIENT
Start: 2021-05-22 | End: 2021-05-22

## 2021-05-22 RX ORDER — DEXTROSE AND SODIUM CHLORIDE 5; .45 G/100ML; G/100ML
INJECTION, SOLUTION INTRAVENOUS CONTINUOUS
Status: DISCONTINUED | OUTPATIENT
Start: 2021-05-23 | End: 2021-05-25 | Stop reason: HOSPADM

## 2021-05-22 RX ORDER — FAMOTIDINE 20 MG/1
20 TABLET, FILM COATED ORAL 2 TIMES DAILY
Status: DISCONTINUED | OUTPATIENT
Start: 2021-05-23 | End: 2021-05-25 | Stop reason: HOSPADM

## 2021-05-22 RX ORDER — HYDROMORPHONE HCL 110MG/55ML
0.5 PATIENT CONTROLLED ANALGESIA SYRINGE INTRAVENOUS
Status: DISCONTINUED | OUTPATIENT
Start: 2021-05-22 | End: 2021-05-23

## 2021-05-22 RX ORDER — HYDROXYUREA 500 MG/1
1000 CAPSULE ORAL 2 TIMES DAILY
Status: DISCONTINUED | OUTPATIENT
Start: 2021-05-23 | End: 2021-05-25 | Stop reason: HOSPADM

## 2021-05-22 RX ORDER — HYDROMORPHONE HCL 110MG/55ML
0.25 PATIENT CONTROLLED ANALGESIA SYRINGE INTRAVENOUS
Status: DISCONTINUED | OUTPATIENT
Start: 2021-05-22 | End: 2021-05-23

## 2021-05-22 RX ORDER — FOLIC ACID 1 MG/1
1 TABLET ORAL DAILY
Status: DISCONTINUED | OUTPATIENT
Start: 2021-05-23 | End: 2021-05-25 | Stop reason: HOSPADM

## 2021-05-22 RX ADMIN — SODIUM CHLORIDE 1000 ML: 9 INJECTION, SOLUTION INTRAVENOUS at 19:18

## 2021-05-22 RX ADMIN — HYDROMORPHONE HYDROCHLORIDE 1 MG: 1 INJECTION, SOLUTION INTRAMUSCULAR; INTRAVENOUS; SUBCUTANEOUS at 19:18

## 2021-05-22 RX ADMIN — HYDROMORPHONE HYDROCHLORIDE 1 MG: 1 INJECTION, SOLUTION INTRAMUSCULAR; INTRAVENOUS; SUBCUTANEOUS at 22:09

## 2021-05-22 RX ADMIN — HYDROMORPHONE HYDROCHLORIDE 1 MG: 1 INJECTION, SOLUTION INTRAMUSCULAR; INTRAVENOUS; SUBCUTANEOUS at 20:28

## 2021-05-22 ASSESSMENT — PAIN SCALES - GENERAL
PAINLEVEL_OUTOF10: 9
PAINLEVEL_OUTOF10: 8
PAINLEVEL_OUTOF10: 10
PAINLEVEL_OUTOF10: 9
PAINLEVEL_OUTOF10: 9
PAINLEVEL_OUTOF10: 8

## 2021-05-22 ASSESSMENT — PAIN DESCRIPTION - PAIN TYPE: TYPE: ACUTE PAIN

## 2021-05-22 ASSESSMENT — PAIN DESCRIPTION - ORIENTATION: ORIENTATION: RIGHT

## 2021-05-22 ASSESSMENT — PAIN DESCRIPTION - LOCATION: LOCATION: SHOULDER;HIP

## 2021-05-23 LAB
ANION GAP SERPL CALCULATED.3IONS-SCNC: 8 MMOL/L (ref 3–16)
BASOPHILS ABSOLUTE: 0 K/UL (ref 0–0.2)
BASOPHILS RELATIVE PERCENT: 0.3 %
BUN BLDV-MCNC: 8 MG/DL (ref 7–20)
CALCIUM SERPL-MCNC: 9.2 MG/DL (ref 8.3–10.6)
CHLORIDE BLD-SCNC: 105 MMOL/L (ref 99–110)
CO2: 26 MMOL/L (ref 21–32)
CREAT SERPL-MCNC: 0.6 MG/DL (ref 0.9–1.3)
EOSINOPHILS ABSOLUTE: 0.1 K/UL (ref 0–0.6)
EOSINOPHILS RELATIVE PERCENT: 0.6 %
GFR AFRICAN AMERICAN: >60
GFR NON-AFRICAN AMERICAN: >60
GLUCOSE BLD-MCNC: 111 MG/DL (ref 70–99)
HCT VFR BLD CALC: 23.3 % (ref 40.5–52.5)
HEMOGLOBIN: 7.9 G/DL (ref 13.5–17.5)
IMMATURE RETIC FRACT: 0.72 (ref 0.21–0.37)
LYMPHOCYTES ABSOLUTE: 2.2 K/UL (ref 1–5.1)
LYMPHOCYTES RELATIVE PERCENT: 18.6 %
MCH RBC QN AUTO: 35.8 PG (ref 26–34)
MCHC RBC AUTO-ENTMCNC: 33.9 G/DL (ref 31–36)
MCV RBC AUTO: 105.6 FL (ref 80–100)
MONOCYTES ABSOLUTE: 1.5 K/UL (ref 0–1.3)
MONOCYTES RELATIVE PERCENT: 12.8 %
NEUTROPHILS ABSOLUTE: 8.1 K/UL (ref 1.7–7.7)
NEUTROPHILS RELATIVE PERCENT: 67.7 %
PDW BLD-RTO: 21.9 % (ref 12.4–15.4)
PLATELET # BLD: 434 K/UL (ref 135–450)
PMV BLD AUTO: 8.8 FL (ref 5–10.5)
POTASSIUM SERPL-SCNC: 3.6 MMOL/L (ref 3.5–5.1)
RBC # BLD: 2.21 M/UL (ref 4.2–5.9)
RETICULOCYTE ABSOLUTE COUNT: 0.18 M/UL
RETICULOCYTE COUNT PCT: 8.07 % (ref 0.5–2.18)
SODIUM BLD-SCNC: 139 MMOL/L (ref 136–145)
WBC # BLD: 11.9 K/UL (ref 4–11)

## 2021-05-23 PROCEDURE — 94761 N-INVAS EAR/PLS OXIMETRY MLT: CPT

## 2021-05-23 PROCEDURE — 85045 AUTOMATED RETICULOCYTE COUNT: CPT

## 2021-05-23 PROCEDURE — 2580000003 HC RX 258: Performed by: INTERNAL MEDICINE

## 2021-05-23 PROCEDURE — 6370000000 HC RX 637 (ALT 250 FOR IP): Performed by: INTERNAL MEDICINE

## 2021-05-23 PROCEDURE — 6360000002 HC RX W HCPCS: Performed by: INTERNAL MEDICINE

## 2021-05-23 PROCEDURE — 36415 COLL VENOUS BLD VENIPUNCTURE: CPT

## 2021-05-23 PROCEDURE — 2700000000 HC OXYGEN THERAPY PER DAY

## 2021-05-23 PROCEDURE — 85025 COMPLETE CBC W/AUTO DIFF WBC: CPT

## 2021-05-23 PROCEDURE — 80048 BASIC METABOLIC PNL TOTAL CA: CPT

## 2021-05-23 PROCEDURE — 1200000000 HC SEMI PRIVATE

## 2021-05-23 RX ORDER — DOCUSATE SODIUM 100 MG/1
100 CAPSULE, LIQUID FILLED ORAL 2 TIMES DAILY
Status: DISCONTINUED | OUTPATIENT
Start: 2021-05-23 | End: 2021-05-25 | Stop reason: HOSPADM

## 2021-05-23 RX ORDER — DIPHENHYDRAMINE HYDROCHLORIDE 50 MG/ML
25 INJECTION INTRAMUSCULAR; INTRAVENOUS EVERY 6 HOURS PRN
Status: DISCONTINUED | OUTPATIENT
Start: 2021-05-23 | End: 2021-05-25 | Stop reason: HOSPADM

## 2021-05-23 RX ORDER — NALOXONE HYDROCHLORIDE 0.4 MG/ML
0.4 INJECTION, SOLUTION INTRAMUSCULAR; INTRAVENOUS; SUBCUTANEOUS PRN
Status: DISCONTINUED | OUTPATIENT
Start: 2021-05-23 | End: 2021-05-25 | Stop reason: HOSPADM

## 2021-05-23 RX ORDER — HYDROMORPHONE HCL 110MG/55ML
0.25 PATIENT CONTROLLED ANALGESIA SYRINGE INTRAVENOUS
Status: DISCONTINUED | OUTPATIENT
Start: 2021-05-23 | End: 2021-05-23

## 2021-05-23 RX ORDER — HYDROMORPHONE HCL 110MG/55ML
0.5 PATIENT CONTROLLED ANALGESIA SYRINGE INTRAVENOUS
Status: DISCONTINUED | OUTPATIENT
Start: 2021-05-23 | End: 2021-05-23

## 2021-05-23 RX ADMIN — HYDROXYUREA 1000 MG: 500 CAPSULE ORAL at 20:29

## 2021-05-23 RX ADMIN — FOLIC ACID 1 MG: 1 TABLET ORAL at 08:27

## 2021-05-23 RX ADMIN — HYDROMORPHONE HYDROCHLORIDE 0.5 MG: 2 INJECTION, SOLUTION INTRAMUSCULAR; INTRAVENOUS; SUBCUTANEOUS at 03:33

## 2021-05-23 RX ADMIN — DEXTROSE AND SODIUM CHLORIDE: 5; 450 INJECTION, SOLUTION INTRAVENOUS at 00:32

## 2021-05-23 RX ADMIN — HYDROMORPHONE HYDROCHLORIDE 0.5 MG: 2 INJECTION, SOLUTION INTRAMUSCULAR; INTRAVENOUS; SUBCUTANEOUS at 16:28

## 2021-05-23 RX ADMIN — HYDROMORPHONE HYDROCHLORIDE 0.5 MG: 2 INJECTION, SOLUTION INTRAMUSCULAR; INTRAVENOUS; SUBCUTANEOUS at 14:38

## 2021-05-23 RX ADMIN — DEXTROSE AND SODIUM CHLORIDE: 5; 450 INJECTION, SOLUTION INTRAVENOUS at 08:39

## 2021-05-23 RX ADMIN — HYDROXYUREA 1000 MG: 500 CAPSULE ORAL at 08:39

## 2021-05-23 RX ADMIN — HYDROMORPHONE HYDROCHLORIDE 0.5 MG: 2 INJECTION, SOLUTION INTRAMUSCULAR; INTRAVENOUS; SUBCUTANEOUS at 12:26

## 2021-05-23 RX ADMIN — HYDROMORPHONE HYDROCHLORIDE 0.5 MG: 2 INJECTION, SOLUTION INTRAMUSCULAR; INTRAVENOUS; SUBCUTANEOUS at 09:50

## 2021-05-23 RX ADMIN — HYDROMORPHONE HYDROCHLORIDE 0.5 MG: 2 INJECTION, SOLUTION INTRAMUSCULAR; INTRAVENOUS; SUBCUTANEOUS at 18:30

## 2021-05-23 RX ADMIN — Medication: at 19:36

## 2021-05-23 RX ADMIN — FAMOTIDINE 20 MG: 20 TABLET ORAL at 08:27

## 2021-05-23 RX ADMIN — DEXTROSE AND SODIUM CHLORIDE: 5; 450 INJECTION, SOLUTION INTRAVENOUS at 17:51

## 2021-05-23 RX ADMIN — OXYCODONE HYDROCHLORIDE 20 MG: 20 TABLET, FILM COATED, EXTENDED RELEASE ORAL at 08:27

## 2021-05-23 RX ADMIN — HYDROMORPHONE HYDROCHLORIDE 0.5 MG: 2 INJECTION, SOLUTION INTRAMUSCULAR; INTRAVENOUS; SUBCUTANEOUS at 06:27

## 2021-05-23 RX ADMIN — OXYCODONE HYDROCHLORIDE 20 MG: 20 TABLET, FILM COATED, EXTENDED RELEASE ORAL at 20:29

## 2021-05-23 RX ADMIN — HYDROMORPHONE HYDROCHLORIDE 0.5 MG: 2 INJECTION, SOLUTION INTRAMUSCULAR; INTRAVENOUS; SUBCUTANEOUS at 00:32

## 2021-05-23 ASSESSMENT — PAIN SCALES - GENERAL
PAINLEVEL_OUTOF10: 9
PAINLEVEL_OUTOF10: 8
PAINLEVEL_OUTOF10: 10
PAINLEVEL_OUTOF10: 7
PAINLEVEL_OUTOF10: 8
PAINLEVEL_OUTOF10: 6
PAINLEVEL_OUTOF10: 8
PAINLEVEL_OUTOF10: 8
PAINLEVEL_OUTOF10: 10
PAINLEVEL_OUTOF10: 10
PAINLEVEL_OUTOF10: 8
PAINLEVEL_OUTOF10: 7
PAINLEVEL_OUTOF10: 7
PAINLEVEL_OUTOF10: 6

## 2021-05-23 ASSESSMENT — PAIN DESCRIPTION - PAIN TYPE
TYPE: ACUTE PAIN

## 2021-05-23 ASSESSMENT — PAIN DESCRIPTION - ORIENTATION
ORIENTATION: RIGHT

## 2021-05-23 ASSESSMENT — PAIN DESCRIPTION - LOCATION
LOCATION: SHOULDER
LOCATION: SHOULDER;HIP
LOCATION: HIP;SHOULDER
LOCATION: HIP;SHOULDER

## 2021-05-23 ASSESSMENT — PAIN DESCRIPTION - ONSET
ONSET: ON-GOING
ONSET: ON-GOING
ONSET: PROGRESSIVE

## 2021-05-23 ASSESSMENT — PAIN DESCRIPTION - PROGRESSION
CLINICAL_PROGRESSION: NOT CHANGED
CLINICAL_PROGRESSION: NOT CHANGED

## 2021-05-23 ASSESSMENT — PAIN DESCRIPTION - FREQUENCY
FREQUENCY: CONTINUOUS
FREQUENCY: CONTINUOUS

## 2021-05-23 ASSESSMENT — PAIN - FUNCTIONAL ASSESSMENT: PAIN_FUNCTIONAL_ASSESSMENT: ACTIVITIES ARE NOT PREVENTED

## 2021-05-23 ASSESSMENT — PAIN DESCRIPTION - DESCRIPTORS
DESCRIPTORS: ACHING;CONSTANT
DESCRIPTORS: ACHING;SORE;CONSTANT

## 2021-05-23 NOTE — ED PROVIDER NOTES
[facility] All diagnostic, treatment, and disposition decisions were made by myself in conjunction with the advanced practice provider. For further details of [facility] emergency department encounter, please see STONEY Virk's documentation. Patient presents to the emergency department complaining of sickle cell pain. Patient reports that he was recently admitted for similar pain in his shoulder/hip. Patient was discharged home after pain has improved, however, he states that pain has worsened since that time and it is uncontrolled with him his home narcotics. Patient reports 10/10 pain that is essentially unchanged at this time point. Patient denies fevers, chills, sweats. No cough, congestion. Physical exam: General: No acute distress good heart: Regular rate rhythm peer normal S1-S2. Lungs: Clear to auscultation bilaterally. No wheezes, rales, rhonchi. Abdomen: Soft. Nontender, nondistended. No rebound, guarding. Extremities: No gross deformity. Distal pulses and sensation intact.          1. Sickle cell pain crisis ([facility] [address]           Primitivo Hernandez DO  05/22/21 5720

## 2021-05-23 NOTE — PLAN OF CARE
Problem: Pain:  Goal: Pain level will decrease  Description: Pain level will decrease  5/23/2021 1108 by Rickie Alonzo RN  Outcome: Ongoing  5/23/2021 0016 by Ramos Putnam RN  Outcome: Ongoing  Goal: Control of acute pain  Description: Control of acute pain  5/23/2021 1108 by Rickie Alonzo RN  Outcome: Ongoing  5/23/2021 0016 by Ramos Putnam RN  Outcome: Ongoing  Goal: Control of chronic pain  Description: Control of chronic pain  5/23/2021 1108 by Rickie Alonzo RN  Outcome: Ongoing  5/23/2021 0016 by Ramos Putnam RN  Outcome: Ongoing

## 2021-05-23 NOTE — H&P
Hospital Medicine History & Physical      PCP: Iván Hutchinson MD    Date of Admission: 5/22/2021    Date of Service: Pt seen/examined on 5/22/2021 and Admitted to Inpatient with expected LOS greater than two midnights due to medical therapy. Chief Complaint: Pain in shoulders, hips      History Of Present Illness:    23 y.o. male who presented to [facility] with above complaints  Patient with PMH of sickle cell followed by Dr. Ham Tran, recently admitted to [facility] on 5/14 and discharged on 5/18 for sickle cell pain crisis presents to the ED today with complaints of pain all over, particularly in his shoulders and hips and lower back. Pain is continuous, 10/10 intensity, not relieved with pain medications prescribed at home. Patient reports since he was home, his pain has not been under good control, got worse over the last couple of days. Denies any cough or shortness of breath. Denies subjective fevers chills or rigors. No abdominal pain nausea vomiting. 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 Authorizing Provider   oxyCODONE (OXYCONTIN) 20 MG extended release tablet TAKE 1 TABLET BY MOUTH EVERY 12 HOURS. TAKE WITH ENOUGH WATER TO SWALLOW WHOLE. DO NOT CRUSH/DISSOLVE/CHEW/CUT/BREAK. 4/12/21  Yes Historical Provider, MD   oxyCODONE 5 MG capsule Take 10 mg by mouth every 4 hours as needed for Pain.    Yes Historical Provider, MD   hydroxyurea (HYDREA) 500 MG chemo capsule Take 2 capsules by mouth 2 times daily 12/9/20  Yes William Villalta MD       Allergies:  Morphine    Social History:      The patient currently lives at home    TOBACCO: reports that he has never smoked. He has never used smokeless tobacco.  ETOH:   reports previous alcohol use. E-Cigarettes/Vaping Use     Questions Responses    E-Cigarette/Vaping Use Never User    Start Date     Passive Exposure     Quit Date     Counseling Given     Comments             Family History:    Reviewed in detail and negative for DM, CAD, Cancer, CVA. REVIEW OF SYSTEMS COMPLETED:   Pertinent positives as noted in the HPI. All other systems reviewed and negative. PHYSICAL EXAM PERFORMED:    /63   Pulse 65   Temp 97.8 °F (36.6 °C) (Oral)   Resp 16   Ht 5' 8\" (1.727 m)   Wt 189 lb 2.5 oz (85.8 kg)   SpO2 98%   BMI 28.76 kg/m²     General appearance:  No apparent distress, appears stated age and cooperative. HEENT:  Normal cephalic, atraumatic without obvious deformity. Pupils equal, round, and reactive to light. Extra ocular muscles intact. Conjunctivae/corneas clear. Neck: Supple, with full range of motion. No jugular venous distention. Trachea midline. Respiratory:  Normal respiratory effort. Clear to auscultation, bilaterally without Rales/Wheezes/Rhonchi. Cardiovascular:  Regular rate and rhythm with normal S1/S2 without murmurs, rubs or gallops. Abdomen: Soft, non-tender, non-distended with normal bowel sounds. Musculoskeletal:  No clubbing, cyanosis or edema bilaterally. Full range of motion without deformity. Tender in the right shoulder  Skin: Skin color, texture, turgor normal.  No rashes or lesions. Neurologic:  Neurovascularly intact without any focal sensory/motor deficits.  Cranial nerves: II-XII intact, grossly non-focal.  Psychiatric:  Alert and oriented, thought content appropriate, normal insight  Capillary Refill: Brisk,3 seconds, normal  Peripheral Pulses: +2 palpable, equal bilaterally       Labs:     Recent Labs     05/22/21 1920   WBC 13.9*   HGB 9.0*   HCT 25.8*   *     Recent Labs     05/22/21 1920      K 3.9      CO2 24   BUN 7 CREATININE 0.7*   CALCIUM 9.9     Recent Labs     05/22/21 1920   AST 38*   ALT 18   BILITOT 4.2*   ALKPHOS 96     No results for input(s): INR in the last 72 hours. No results for input(s): Anjali Deleon in the last 72 hours. Urinalysis:      Lab Results   Component Value Date    NITRU Negative 05/15/2021    WBCUA 0-2 05/15/2021    BACTERIA 2+ 12/08/2020    RBCUA 0-2 05/15/2021    BLOODU TRACE-INTACT 05/15/2021    SPECGRAV 1.015 05/15/2021    GLUCOSEU Negative 05/15/2021       Radiology:     CXR: I have reviewed the CXR with the following interpretation: No acute process      XR CHEST PORTABLE   Final Result   Stable chest with no acute abnormality seen. ASSESSMENT:    Active Hospital Problems    Diagnosis Date Noted    Sickle cell pain crisis ([facility] [address] [D57.00] 07/25/2020   Anemia      PLAN:    -Start IV fluids D5 half-normal saline  -Pain control with Dilaudid IV sliding scale  -Continue folic acid hydroxyurea  -Hemolysis labs ordered  -Heme-onc consult  -Hb at baseline, no transfusion indicated at this time    DVT Prophylaxis: Lovenox  Diet: DIET GENERAL;  Code Status: Full Code    PT/OT Eval Status: Ambulatory    Dispo -IP stay       Jovani Salguero MD    Thank you Henok Vail MD for the opportunity to be involved in this patient's care. If you have any questions or concerns please feel free to contact me at [phone].

## 2021-05-23 NOTE — PROGRESS NOTES
Pt arrived to room 3552__. Pt able to ambulate with steady gait from stretcher to bed independently. VSS. Assessment complete. No s/s of distress, pt tolerated well. Pt A/O x4. O2 sat 98__% on _RA__. Oriented patient to call light, television controls, and room, verbalized understanding. Education initiated and all questions answered at this time. Non skid footwear placed on feet. Bed in lowest position with wheels locked. Patient instructed to call out for needs, denies needs at this time. Call light within reach. Will cont to monitor.

## 2021-05-23 NOTE — CONSULTS
HEMATOLOGY/ONCOLOGY CONSULTATION:     5/23/2021 9:09 AM    REASON FOR CONSULT: Sickle cell disease    PROVIDERS:  Vidhi Thompson MD    CHIEF COMPLAINT:     Chief Complaint   Patient presents with    Sickle Cell Pain Crisis     Pt states was admitted for sickle cell last week - was discharged Tuesday - stated was told to come back if pain persists. R shoulder and R hip pain. HISTORY OF PRESENT ILLNESS:     HPI:  23 AAM with pmh sickle cell disease. The patient is followed by my partner Dr. Brady July. He was just discharged last week after admission for a pain crisis. He presented back to the hospital with worsening shoulder and hip pain. He was unable to control his symptoms with his home medications. His Hgb is stable. He is afebrile. He is admitted for pain crisis.      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)     Sickle cell anemia (HCC)        PAST SURGICAL HISTORY:        Past Surgical History:   Procedure Laterality Date    SPLENECTOMY         SOCIAL HISTORY:     Social History     Socioeconomic History    Marital status: Single     Spouse name: Not on file    Number of children: 0    Years of education: Not on file    Highest education level: Not on file   Occupational History    Not on file   Tobacco Use    Smoking status: Never Smoker    Smokeless tobacco: Never Used   Vaping Use    Vaping Use: Never used   Substance and Sexual Activity    Alcohol use: Not Currently    Drug use: Never    Sexual activity: Not Currently   Other Topics Concern    Not on file   Social History Narrative    Not on file     Social Determinants of Health     Financial Resource Strain:     Difficulty of Paying Living Expenses:    Food Insecurity:     Worried About Running Out of Food in the Last Year:     Laura of Food in the Last Year:    Transportation Needs:     Lack of Transportation (Medical):  Lack of Transportation (Non-Medical):    Physical Activity:     Days of Exercise per Week:     Minutes of Exercise per Session:    Stress:     Feeling of Stress :    Social Connections:     Frequency of Communication with Friends and Family:     Frequency of Social Gatherings with Friends and Family:     Attends Church Services:     Active Member of Clubs or Organizations:     Attends Club or Organization Meetings:     Marital Status:    Intimate Partner Violence:     Fear of Current or Ex-Partner:     Emotionally Abused:     Physically Abused:     Sexually Abused:        FAMILY HISTORY:     History reviewed. No pertinent family history. ALLERGIES:     Allergies as of 05/22/2021 - Fully Reviewed 05/22/2021   Allergen Reaction Noted    Morphine Shortness Of Breath 04/05/2013       MEDICATIONS:     No current facility-administered medications on file prior to encounter. Current Outpatient Medications on File Prior to Encounter   Medication Sig Dispense Refill    oxyCODONE (OXYCONTIN) 20 MG extended release tablet TAKE 1 TABLET BY MOUTH EVERY 12 HOURS. TAKE WITH ENOUGH WATER TO SWALLOW WHOLE. DO NOT CRUSH/DISSOLVE/CHEW/CUT/BREAK.  oxyCODONE 5 MG capsule Take 10 mg by mouth every 4 hours as needed for Pain.  hydroxyurea (HYDREA) 500 MG chemo capsule Take 2 capsules by mouth 2 times daily       REVIEW OF SYSTEMS:       10 point ROS completed. Pertinent positives in HPI, otherwise negative.      PHYSICAL EXAM:       Vitals:    05/23/21 0826   BP: 109/63   Pulse: 56   Resp: 16   Temp: 97.7 °F (36.5 °C)   SpO2: 97%       General appearance: alert and cooperative  Head: Normocephalic, without obvious abnormality, atraumatic  Neck: No palpable lymphadenopathy in supraclavicular or cervical chains  Lungs: Clear to auscultation bilaterally, no audible rales, wheezes or crackles  Heart: Regular rate and rhythm, S1, S2 normal  Abdomen: Soft, non-tender; bowel sounds normal; no masses,  no organomegaly  Extremities: without cyanosis, clubbing, edema or asymmetry  Skin: No jaundice, purpura or petechiae      LABS:     Lab Results   Component Value Date    WBC 11.9 (H) 05/23/2021    HGB 7.9 (L) 05/23/2021    HCT 23.3 (L) 05/23/2021    .6 (H) 05/23/2021     05/23/2021       Lab Results   Component Value Date    GLUCOSE 111 (H) 05/23/2021    BUN 8 05/23/2021    CREATININE 0.6 (L) 05/23/2021    K 3.6 05/23/2021       Lab Results   Component Value Date    ALKPHOS 96 05/22/2021    ALT 18 05/22/2021    AST 38 (H) 05/22/2021    BILITOT 4.2 (H) 05/22/2021    BILIDIR 2.4 (H) 12/05/2020    PROT 7.9 05/22/2021       Lab Results   Component Value Date     (H) 05/14/2021       IMAGING:     XR CHEST PORTABLE  Result Date: 5/22/2021  Stable chest with no acute abnormality seen. XR CHEST PORTABLE  Result Date: 5/15/2021  Clear lungs. Cardiomegaly. No acute cardiopulmonary abnormality. XR CHEST PORTABLE  Result Date: 5/3/2021  No acute process. MRI SHOULDER RIGHT WO CONTRAST  Result Date: 5/17/2021  1. Foci of subchondral edema in the humeral head possibly representing avascular necrosis. No subchondral collapse. Alternative potential etiologies include degenerative subchondral edema and contusions. 2. No rotator cuff or labral tear. MRI HIP RIGHT WO CONTRAST  Result Date: 5/17/2021  1. No avascular necrosis, acute osseous abnormality, or other explanation for the patient's pain. 2. Small volume of free fluid in the pelvis of uncertain clinical significance in a male patient.      ASSESSMENT:     Problem List Items Addressed This Visit     Sickle cell pain crisis ([facility] [address] - Primary                PLAN:     Sickle cell crisis:  -continue Hydrea 1000 mg p.o. twice daily  -continue Folic acid 1 mg daily  -he gets Crizanlizumab 5 mg/kg every 3 weeks  -pain crisis have not been well controlled  -has been in the hospital repetitively  -per Dr. Fernando Farley,  could try him

## 2021-05-23 NOTE — PROGRESS NOTES
Hospitalist Progress Note  5/23/2021 9:41 AM  Subjective:   Admit Date: 5/22/2021  PCP: Jeremy Rosas MD Status: Inpatient [101]  Interval History: Hospital Day: 2, admitted with sickle cell pain crisis with volume expansion and IV hydrocodone. Right shoulder and right hip pain. He gets Crizanlizumab 5 mg/kg every 3 weeks. It has been more than 3 weeks since last injection with the intention of increase the interval between doses. Laymon Median has been discussed. MRI of his shoulder may have shown a small amount of avascular necrosis, but nothing that likely need surgery. MRI of the hip does not show avascular necrosis. History of present illness:  PMH of sickle cell diseaes followed by Dr. Mukund Hamilton, recently hospitalized at [facility] (5/14 - 5/18) for sickle cell pain crisis presented to the ED (5/22) with complaints of pain all over, particularly in his shoulders and hips and lower back. Pain is continuous, 10/10 intensity, not relieved with pain medications prescribed at home. Patient reports since he was home, his pain has not been under good control, got worse over the last couple of days. DIET GENERAL;    Medications:     dextrose 5 % and 0.45 % NaCl 150 mL/hr      hydroxyurea  1,000 mg Oral BID   oxycodone  20 mg Oral 2 times per day   folic acid  1 mg Oral Daily   senna  1 tablet Oral Nightly   famotidine  20 mg Oral BID     Recent Labs     05/22/21 1920 05/23/21  0753   WBC 13.9* 11.9*   HGB 9.0* 7.9*   * 434   .5* 105.6*     Recent Labs     05/22/21 1920 05/23/21  0754    139   K 3.9 3.6    105   CO2 24 26   BUN 7 8   CREATININE 0.7* 0.6*   GLUCOSE 102* 111*     Recent Labs     05/22/21 1920   AST 38*   ALT 18   BILITOT 4.2*   ALKPHOS 96     Reticulocyte (5/22) 8.9%, (5/23) 8.1% (normal 0.5 - 2.1%)    Portable CXR (5/22) Stable chest with no acute abnormality seen.     MRI right hip (5/17) No avascular necrosis, acute osseous abnormality, or other explanation for the patient's pain. Small volume of free fluid in the pelvis of uncertain clinical significance in a male patient. MRI right hip (5/17) Foci of subchondral edema in the humeral head possibly representing avascular necrosis.  No  subchondral collapse.  Alternative potential etiologies include degenerative subchondral edema and contusions. No rotator cuff or labral tear. Objective:   Vitals:  /63   Pulse 56   Temp 97.7 °F (oral)   Resp 16   Ht 5' 8\"  Wt 85.8 kg  SpO2 97% on RA  BMI 28.76 kg/m²   General appearance: alert and cooperative with exam, moderate distress  Lungs: clear to auscultation bilaterally  Heart: regular rate and rhythm, S1, S2 normal, no murmur, click, rub or gallop  Abdomen: soft, non-tender; bowel sounds normal; no masses,  no organomegaly  Extremities: extremities normal, atraumatic, no cyanosis or edema  Neurologic: No obvious focal neurologic deficits. Assessment and Plan:   1. Sickle Cell Disease with pain crisis:  Volume expansion with dextrose 5 % and 0.45 % NaCl at 150 ml/yr. Basal oxycodone 20 mg every 12 hours and breakthrough hydromorphone (Dilaudid) 0.5 mg every 2 hrs (changed frequency from q3 hrs) as needed. Hem/Onc consultation with Dr. Samantha King. Continues on hydroxyurea 1,000 mg BID and folate 1 mg daily. 2. Gastric reflux and stress ulcer prophylaxis with famotidine (Pepcid) 20 mg BID. Advance Directive: Full Code  DVT prophylaxis with intermittent compression, no enoxaparin due to hemoglobinopathy  Discharge planning: pending Hem/Onc evaluation, will likely require another two days inpatient status. May need a dose adjustment in home pain regimen.        Celia Aldana MD, MD  [facility] Hospitalist

## 2021-05-23 NOTE — ED PROVIDER NOTES
[facility] Emergency Department    CHIEF COMPLAINT  Sickle Cell Pain Crisis (Pt states was admitted for sickle cell last week - was discharged Tuesday - stated was told to come back if pain persists. R shoulder and R hip pain.)      SHARED SERVICE VISIT  Evaluated by SHONNA. My supervising physician was available for consultation. HISTORY OF PRESENT ILLNESS  Kandy Scott is a 23 y.o. male of sickle cell disease presents emergency department for evaluation of right shoulder and right hip pain. Patient was discharged from the hospital Tuesday for a sickle cell crisis. Then patient states he has been unable to keep his pain control at home with his p.o. medication. Denies any recent injury to the extremities and states the pain is similar to the pain he was experiencing on his prior admission. Denies any chest pain difficulty breathing. No other complaints, modifying factors or associated symptoms. Nursing notes reviewed. 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 No pertinent family history.   Social History     Socioeconomic History    Marital status: Single     Spouse name: Not on file    Number of children: 0    Years of education: Not on file    Highest education level: Not on file   Occupational History    Not on file   Tobacco Use    Smoking status: Never Smoker    Smokeless tobacco: Never Used   Vaping Use    Vaping Use: Never used   Substance and Sexual Activity    Alcohol use: Not Currently    Drug use: Never    Sexual activity: Not Currently   Other Topics Concern    Not on file   Social History Narrative    Not on file     Social Determinants of Health     Financial Resource Strain:     Difficulty of Paying Living Expenses: Food Insecurity:     Worried About Running Out of Food in the Last Year:     [address] in the Last Year:    Transportation Needs:     Lack of Transportation (Medical):  Lack of Transportation (Non-Medical):    Physical Activity:     Days of Exercise per Week:     Minutes of Exercise per Session:    Stress:     Feeling of Stress :    Social Connections:     Frequency of Communication with Friends and Family:     Frequency of Social Gatherings with Friends and Family:     Attends [employer] Services:     Active Member of Clubs or Organizations:     Attends Club or Organization Meetings:     Marital Status:    Intimate Partner Violence:     Fear of Current or Ex-Partner:     Emotionally Abused:     Physically Abused:     Sexually Abused:      No current facility-administered medications for this encounter. Current Outpatient Medications   Medication Sig Dispense Refill    oxyCODONE (OXYCONTIN) 20 MG extended release tablet TAKE 1 TABLET BY MOUTH EVERY 12 HOURS. TAKE WITH ENOUGH WATER TO SWALLOW WHOLE. DO NOT CRUSH/DISSOLVE/CHEW/CUT/BREAK.  oxyCODONE 5 MG capsule Take 10 mg by mouth every 4 hours as needed for Pain.  hydroxyurea (HYDREA) 500 MG chemo capsule Take 2 capsules by mouth 2 times daily       Allergies   Allergen Reactions    Morphine Shortness Of Breath     Other reaction(s): Histamine-like Reaction  Has asthma exacerbation with morphine-histamine type reaction         REVIEW OF SYSTEMS  10 systems reviewed, pertinent positives per HPI otherwise noted to be negative    PHYSICAL EXAM  /61   Pulse 79   Temp 98.5 °F (36.9 °C) (Oral)   Resp 26   Ht 5' 8\" (1.727 m)   Wt 190 lb (86.2 kg)   SpO2 97%   BMI 28.89 kg/m²   GENERAL APPEARANCE: Awake and alert. Cooperative. HEAD: Normocephalic. Atraumatic. EYES: PERRL. EOM's grossly intact. ENT: Mucous membranes are moist.   NECK: Supple. HEART: RRR. No murmurs. LUNGS: Respirations unlabored. CTAB.  Good air exchange. Speaking comfortably in full sentences. ABDOMEN: Soft. Non-distended. Non-tender. No guarding or rebound. No masses. No organomegaly. EXTREMITIES: No peripheral edema. Moves all extremities equally. All extremities neurovascularly intact. Reports pain with passive range of motion of the right shoulder. No overlying erythema or bony deformities. Compartments are soft  SKIN: Warm and dry. No acute rashes. No erythema. NEUROLOGICAL: Alert and oriented. CN's 2-12 intact. No gross facial drooping. Strength 5/5, sensation intact. PSYCHIATRIC: Normal mood and affect. RADIOLOGY  XR CHEST PORTABLE   Final Result   Stable chest with no acute abnormality seen.                LABS  Labs Reviewed   CBC WITH AUTO DIFFERENTIAL - Abnormal; Notable for the following components:       Result Value    WBC 13.9 (*)     RBC 2.49 (*)     Hemoglobin 9.0 (*)     Hematocrit 25.8 (*)     .5 (*)     MCH 36.2 (*)     RDW 22.7 (*)     Platelets 377 (*)     Neutrophils Absolute 11.7 (*)     All other components within normal limits    Narrative:     Performed at:                  [address], [facility] [employer]   Phone [phone]   COMPREHENSIVE METABOLIC PANEL W/ REFLEX TO MG FOR LOW K - Abnormal; Notable for the following components:    Glucose 102 (*)     CREATININE 0.7 (*)     Total Bilirubin 4.2 (*)     AST 38 (*)     All other components within normal limits    Narrative:     Performed at:                  [place] [address] [employer]   Phone [phone]   RETICULOCYTES - Abnormal; Notable for the following components:    Retic Ct Pct 8.86 (*)     Immature Retic Fract 0.55 (*)     All other components within normal limits    Narrative:     Performed at:                  [address] [address]   Phone [phone]       PROCEDURES  Unless otherwise noted below, none  Procedures    ED COURSE         CRITICAL CARE TIME   Minutes of critical care time spent not including separately billable procedures. MDM  MDM   65-year-old male history of sickle cell anemia presents emergency department for evaluation of sickle cell crisis. Patient was recently discharged last Tuesday for right shoulder and right hip pain secondary to a suspected crisis. Patient unable to unable to keep his pain under control at home with his p.o. medication in the emergency department for evaluation. On arrival to ED patient's vitals were within normal limits. Patient received IV fluids and multiple rounds of pain medication. Given that we are unable to fill patient's pain here in the emergency department we will reach out the hospital service to discuss admission/observation for unretractable pain secondary to sickle cell crisis. DISPOSITION  Request admission/observation for sickle cell pain crisis    CLINICAL IMPRESSION  1.  Sickle cell pain crisis ([facility]           Emerita Durán PA-C  05/23/21 0100

## 2021-05-24 LAB
A/G RATIO: 1.3 (ref 1.1–2.2)
ALBUMIN SERPL-MCNC: 4 G/DL (ref 3.4–5)
ALP BLD-CCNC: 71 U/L (ref 40–129)
ALT SERPL-CCNC: 13 U/L (ref 10–40)
ANION GAP SERPL CALCULATED.3IONS-SCNC: 7 MMOL/L (ref 3–16)
ANISOCYTOSIS: ABNORMAL
AST SERPL-CCNC: 27 U/L (ref 15–37)
BASOPHILIC STIPPLING: ABNORMAL
BASOPHILS ABSOLUTE: 0 K/UL (ref 0–0.2)
BASOPHILS RELATIVE PERCENT: 0.3 %
BILIRUB SERPL-MCNC: 2.5 MG/DL (ref 0–1)
BUN BLDV-MCNC: 6 MG/DL (ref 7–20)
CALCIUM SERPL-MCNC: 9.3 MG/DL (ref 8.3–10.6)
CHLORIDE BLD-SCNC: 107 MMOL/L (ref 99–110)
CO2: 26 MMOL/L (ref 21–32)
CREAT SERPL-MCNC: 0.6 MG/DL (ref 0.9–1.3)
EOSINOPHILS ABSOLUTE: 0.2 K/UL (ref 0–0.6)
EOSINOPHILS RELATIVE PERCENT: 1.4 %
GFR AFRICAN AMERICAN: >60
GFR NON-AFRICAN AMERICAN: >60
GLOBULIN: 3.1 G/DL
GLUCOSE BLD-MCNC: 87 MG/DL (ref 70–99)
HCT VFR BLD CALC: 28 % (ref 40.5–52.5)
HEMATOLOGY PATH CONSULT: NO
HEMOGLOBIN: 9.7 G/DL (ref 13.5–17.5)
HOWELL-JOLLY BODIES: ABNORMAL
IMMATURE RETIC FRACT: 0.57 (ref 0.21–0.37)
LYMPHOCYTES ABSOLUTE: 3.3 K/UL (ref 1–5.1)
LYMPHOCYTES RELATIVE PERCENT: 27.7 %
MACROCYTES: ABNORMAL
MCH RBC QN AUTO: 36.3 PG (ref 26–34)
MCHC RBC AUTO-ENTMCNC: 34.5 G/DL (ref 31–36)
MCV RBC AUTO: 105.1 FL (ref 80–100)
MICROCYTES: ABNORMAL
MONOCYTES ABSOLUTE: 1.2 K/UL (ref 0–1.3)
MONOCYTES RELATIVE PERCENT: 10.3 %
NEUTROPHILS ABSOLUTE: 7.3 K/UL (ref 1.7–7.7)
NEUTROPHILS RELATIVE PERCENT: 60.3 %
OVALOCYTES: ABNORMAL
PDW BLD-RTO: 20.6 % (ref 12.4–15.4)
PLATELET # BLD: 519 K/UL (ref 135–450)
PLATELET SLIDE REVIEW: ABNORMAL
PMV BLD AUTO: 8.4 FL (ref 5–10.5)
POIKILOCYTES: ABNORMAL
POLYCHROMASIA: ABNORMAL
POTASSIUM SERPL-SCNC: 3.9 MMOL/L (ref 3.5–5.1)
RBC # BLD: 2.67 M/UL (ref 4.2–5.9)
REASON FOR REJECTION: NORMAL
REJECTED TEST: NORMAL
RETICULOCYTE ABSOLUTE COUNT: 0.21 M/UL
RETICULOCYTE COUNT PCT: 7.92 % (ref 0.5–2.18)
SICKLE CELLS: ABNORMAL
SLIDE REVIEW: ABNORMAL
SODIUM BLD-SCNC: 140 MMOL/L (ref 136–145)
STOMATOCYTES: ABNORMAL
TARGET CELLS: ABNORMAL
TOTAL PROTEIN: 7.1 G/DL (ref 6.4–8.2)
WBC # BLD: 12.1 K/UL (ref 4–11)

## 2021-05-24 PROCEDURE — 85025 COMPLETE CBC W/AUTO DIFF WBC: CPT

## 2021-05-24 PROCEDURE — 6370000000 HC RX 637 (ALT 250 FOR IP): Performed by: INTERNAL MEDICINE

## 2021-05-24 PROCEDURE — 2580000003 HC RX 258: Performed by: INTERNAL MEDICINE

## 2021-05-24 PROCEDURE — 85045 AUTOMATED RETICULOCYTE COUNT: CPT

## 2021-05-24 PROCEDURE — 1200000000 HC SEMI PRIVATE

## 2021-05-24 PROCEDURE — 6360000002 HC RX W HCPCS: Performed by: INTERNAL MEDICINE

## 2021-05-24 PROCEDURE — 80053 COMPREHEN METABOLIC PANEL: CPT

## 2021-05-24 PROCEDURE — 6370000000 HC RX 637 (ALT 250 FOR IP)

## 2021-05-24 PROCEDURE — 2700000000 HC OXYGEN THERAPY PER DAY

## 2021-05-24 PROCEDURE — 2580000003 HC RX 258: Performed by: NURSE PRACTITIONER

## 2021-05-24 PROCEDURE — 6360000002 HC RX W HCPCS: Performed by: NURSE PRACTITIONER

## 2021-05-24 PROCEDURE — 94761 N-INVAS EAR/PLS OXIMETRY MLT: CPT

## 2021-05-24 PROCEDURE — 36415 COLL VENOUS BLD VENIPUNCTURE: CPT

## 2021-05-24 RX ORDER — KETOROLAC TROMETHAMINE 30 MG/ML
30 INJECTION, SOLUTION INTRAMUSCULAR; INTRAVENOUS EVERY 8 HOURS
Status: DISCONTINUED | OUTPATIENT
Start: 2021-05-24 | End: 2021-05-25 | Stop reason: HOSPADM

## 2021-05-24 RX ADMIN — FAMOTIDINE 20 MG: 20 TABLET ORAL at 09:49

## 2021-05-24 RX ADMIN — KETOROLAC TROMETHAMINE 30 MG: 30 INJECTION, SOLUTION INTRAMUSCULAR; INTRAVENOUS at 20:18

## 2021-05-24 RX ADMIN — Medication: at 21:41

## 2021-05-24 RX ADMIN — DOCUSATE SODIUM 100 MG: 100 CAPSULE, LIQUID FILLED ORAL at 09:49

## 2021-05-24 RX ADMIN — DEXTROSE AND SODIUM CHLORIDE: 5; 450 INJECTION, SOLUTION INTRAVENOUS at 07:46

## 2021-05-24 RX ADMIN — DEXTROSE AND SODIUM CHLORIDE: 5; 450 INJECTION, SOLUTION INTRAVENOUS at 18:05

## 2021-05-24 RX ADMIN — OXYCODONE HYDROCHLORIDE 20 MG: 20 TABLET, FILM COATED, EXTENDED RELEASE ORAL at 09:48

## 2021-05-24 RX ADMIN — OXYCODONE HYDROCHLORIDE 20 MG: 20 TABLET, FILM COATED, EXTENDED RELEASE ORAL at 22:11

## 2021-05-24 RX ADMIN — Medication 0.2 MG: at 08:16

## 2021-05-24 RX ADMIN — KETOROLAC TROMETHAMINE 30 MG: 30 INJECTION, SOLUTION INTRAMUSCULAR; INTRAVENOUS at 12:35

## 2021-05-24 RX ADMIN — HYDROXYUREA 1000 MG: 500 CAPSULE ORAL at 22:11

## 2021-05-24 RX ADMIN — FOLIC ACID 1 MG: 1 TABLET ORAL at 09:49

## 2021-05-24 RX ADMIN — FAMOTIDINE 20 MG: 20 TABLET ORAL at 22:22

## 2021-05-24 RX ADMIN — SENNOSIDES 8.6 MG: 8.6 TABLET, FILM COATED ORAL at 22:11

## 2021-05-24 RX ADMIN — Medication: at 00:24

## 2021-05-24 RX ADMIN — DOCUSATE SODIUM 100 MG: 100 CAPSULE, LIQUID FILLED ORAL at 22:11

## 2021-05-24 RX ADMIN — DEXTROSE AND SODIUM CHLORIDE: 5; 450 INJECTION, SOLUTION INTRAVENOUS at 00:16

## 2021-05-24 RX ADMIN — HYDROXYUREA 1000 MG: 500 CAPSULE ORAL at 09:53

## 2021-05-24 ASSESSMENT — PAIN DESCRIPTION - ORIENTATION
ORIENTATION: RIGHT

## 2021-05-24 ASSESSMENT — PAIN DESCRIPTION - PROGRESSION
CLINICAL_PROGRESSION: NOT CHANGED

## 2021-05-24 ASSESSMENT — PAIN SCALES - GENERAL
PAINLEVEL_OUTOF10: 7
PAINLEVEL_OUTOF10: 6
PAINLEVEL_OUTOF10: 7
PAINLEVEL_OUTOF10: 7
PAINLEVEL_OUTOF10: 8
PAINLEVEL_OUTOF10: 7
PAINLEVEL_OUTOF10: 8

## 2021-05-24 ASSESSMENT — PAIN DESCRIPTION - LOCATION
LOCATION: SHOULDER

## 2021-05-24 ASSESSMENT — PAIN DESCRIPTION - PAIN TYPE
TYPE: ACUTE PAIN

## 2021-05-24 ASSESSMENT — PAIN DESCRIPTION - ONSET
ONSET: ON-GOING

## 2021-05-24 ASSESSMENT — PAIN DESCRIPTION - DESCRIPTORS
DESCRIPTORS: ACHING
DESCRIPTORS: ACHING
DESCRIPTORS: ACHING;SORE
DESCRIPTORS: ACHING

## 2021-05-24 ASSESSMENT — PAIN - FUNCTIONAL ASSESSMENT
PAIN_FUNCTIONAL_ASSESSMENT: ACTIVITIES ARE NOT PREVENTED

## 2021-05-24 ASSESSMENT — PAIN DESCRIPTION - FREQUENCY
FREQUENCY: CONTINUOUS

## 2021-05-24 NOTE — PROGRESS NOTES
Hospitalist Progress Note      PCP: Landy Ribera MD    Date of Admission: 5/22/2021    Chief Complaint: SSC pain     Hospital Course: 22 yo male admitted for [facility] followed by Oncology team.     Subjective: EMR notes reviewed patient seen and examined pain seems to be improved control. No fevers chills nausea vomiting or diarrhea no chest pain or shortness of breath      Medications:  Reviewed    Infusion Medications    HYDROmorphone 0.2 mg (05/24/21 0816)    dextrose 5 % and 0.45 % NaCl 75 mL/hr at 05/24/21 0948     Scheduled Medications    docusate sodium  100 mg Oral BID    hydroxyurea  1,000 mg Oral BID    oxyCODONE  20 mg Oral 2 times per day    folic acid  1 mg Oral Daily    senna  1 tablet Oral Nightly    famotidine  20 mg Oral BID     PRN Meds: naloxone, diphenhydrAMINE      Intake/Output Summary (Last 24 hours) at 5/24/2021 0959  Last data filed at 5/24/2021 0920  Gross per 24 hour   Intake 7616 ml   Output 2600 ml   Net 5016 ml       Physical Exam Performed:    BP (!) 103/52   Pulse (!) 49   Temp 97.5 °F (36.4 °C) (Oral)   Resp 15   Ht 5' 8\" (1.727 m)   Wt 189 lb 2.5 oz (85.8 kg)   SpO2 100%   BMI 28.76 kg/m²     General appearance: No apparent distress, appears stated age and cooperative. HEENT: Pupils equal, round, and reactive to light. Conjunctivae/corneas clear. Neck: Supple, with full range of motion. No jugular venous distention. Trachea midline. Respiratory:  Normal respiratory effort. Clear to auscultation, bilaterally without Rales/Wheezes/Rhonchi. Cardiovascular: Regular rate and rhythm with normal S1/S2 without murmurs, rubs or gallops. Abdomen: Soft, non-tender, non-distended with normal bowel sounds. Musculoskeletal: No clubbing, cyanosis or edema bilaterally. Full range of motion without deformity. Skin: Skin color, texture, turgor normal.  No rashes or lesions. Neurologic:  Neurovascularly intact without any focal sensory/motor deficits.  Cranial nerves: II-XII intact, grossly non-focal.  Psychiatric: Alert and oriented, thought content appropriate, normal insight  Capillary Refill: Brisk,3 seconds, normal   Peripheral Pulses: +2 palpable, equal bilaterally       Labs:   Recent Labs     05/22/21 1920 05/23/21  0753   WBC 13.9* 11.9*   HGB 9.0* 7.9*   HCT 25.8* 23.3*   * 434     Recent Labs     05/22/21 1920 05/23/21  0754    139   K 3.9 3.6    105   CO2 24 26   BUN 7 8   CREATININE 0.7* 0.6*   CALCIUM 9.9 9.2     Recent Labs     05/22/21 1920   AST 38*   ALT 18   BILITOT 4.2*   ALKPHOS 96     No results for input(s): INR in the last 72 hours. No results for input(s): Whit Burdock in the last 72 hours. Urinalysis:      Lab Results   Component Value Date    NITRU Negative 05/15/2021    WBCUA 0-2 05/15/2021    BACTERIA 2+ 12/08/2020    RBCUA 0-2 05/15/2021    BLOODU TRACE-INTACT 05/15/2021    SPECGRAV 1.015 05/15/2021    GLUCOSEU Negative 05/15/2021       Radiology:  XR CHEST PORTABLE   Final Result   Stable chest with no acute abnormality seen.                  Assessment/Plan:    Active Hospital Problems    Diagnosis     Sickle cell pain crisis ([facility] [D57.00]      SCC/ Pain/Anemia   - Hem/Onc consulted and following  -Continue pain management hydration follow labs        DVT Prophylaxis: NA sickle cell crisis  Diet: DIET GENERAL;  Code Status: Full Code    PT/OT Eval Status: NA    Dispo -per hematology and oncology    Dimple Shelling, APRN - CNP

## 2021-05-24 NOTE — PROGRESS NOTES
ONCOLOGY HEMATOLOGY CARE PROGRESS NOTE      SUBJECTIVE:           ROS:     Constitutional:  No weight loss, No fever, No chills, No night sweats. Energy level good. Eyes:  No impairment or change in vision  ENT / Mouth:  No pain, abnormal ulceration, bleeding, nasal drip or change in voice or hearing  Cardiovascular:  No chest pain, palpitations, new edema, or calf discomfort  Respiratory:  No pain, hemoptysis, change to breathing  Breast:  No pain, discharge, change in appearance or texture  Gastrointestinal:  No pain, cramping, jaundice, change to eating and bowel habits  Urinary:  No pain, bleeding or change in continence  Genitalia: No pain, bleeding or discharge  Musculoskeletal:  No redness, pain, edema or weakness  Skin:  No pruritus, rash, change to nodules or lesions  Neurologic:  No discomfort, change in mental status, speech, sensory or motor activity  Psychiatric:  No change in concentration or change to affect or mood  Endocrine:  No hot flashes, increased thirst, or change to urine production  Hematologic: No petechiae, ecchymosis or bleeding  Lymphatic:  No lymphadenopathy or lymphedema  Allergy / Immunologic:  No eczema, hives, frequent or recurrent infections    OBJECTIVE        Physical    VITALS:  BP (!) 103/52   Pulse (!) 49   Temp 97.5 °F (36.4 °C) (Oral)   Resp 15   Ht 5' 8\" (1.727 m)   Wt 189 lb 2.5 oz (85.8 kg)   SpO2 100%   BMI 28.76 kg/m²   TEMPERATURE:  Current - Temp: 97.5 °F (36.4 °C);  Max - Temp  Av °F (36.7 °C)  Min: 97.5 °F (36.4 °C)  Max: 98.4 °F (36.9 °C)  PULSE OXIMETRY RANGE: SpO2  Av.1 %  Min: 96 %  Max: 100 %  24HR INTAKE/OUTPUT:      Intake/Output Summary (Last 24 hours) at 2021 1036  Last data filed at 2021 0920  Gross per 24 hour   Intake 7616 ml   Output 2600 ml   Net 5016 ml       CONSTITUTIONAL: awake, alert, cooperative, no apparent distress   EYES: pupils equal, round and reactive to light, sclera clear and conjunctiva normal  ENT: Normocephalic, without obvious abnormality, atraumatic  NECK: supple, symmetrical, no jugular venous distension and no carotid bruits   HEMATOLOGIC/LYMPHATIC: no cervical, supraclavicular or axillary lymphadenopathy   LUNGS: no increased work of breathing and clear to auscultation   CARDIOVASCULAR: regular rate and rhythm, normal S1 and S2, no murmur noted  ABDOMEN: normal bowel sounds x 4, soft, non-distended, non-tender, no masses palpated, no hepatosplenomgaly   MUSCULOSKELETAL: full range of motion noted, tone is normal  NEUROLOGIC: awake, alert, oriented to name, place and time. Motor skills grossly intact. SKIN: Normal skin color, texture, turgor and no jaundice.  appears intact   EXTREMITIES: no LE edema       Data      Recent Labs     05/22/21 1920 05/23/21  0753   WBC 13.9* 11.9*   HGB 9.0* 7.9*   HCT 25.8* 23.3*   * 434   .5* 105.6*        Recent Labs     05/22/21 1920 05/23/21  0754    139   K 3.9 3.6    105   CO2 24 26   BUN 7 8   CREATININE 0.7* 0.6*     Recent Labs     05/22/21 1920   AST 38*   ALT 18   BILITOT 4.2*   ALKPHOS 96       Magnesium:    Lab Results   Component Value Date    MG 1.80 05/05/2021    MG 1.80 05/04/2021    MG 1.90 01/12/2021         Problem List  Patient Active Problem List   Diagnosis    Sickle cell pain crisis ([facility]    Asthma    Sickle cell crisis ([facility]    Sepsis ([facility]    Opiate overdose ([facility]    Opiate antagonist poisoning, accidental or unintentional, initial encounter    Leukocytosis    Hypoxia    Anemia    Other chest pain    Pneumonia due to infectious organism    Fever    Chronic prescription opiate use       ASSESSMENT AND PLAN:         Sickle cell crisis:  -continue Hydrea 1000 mg p.o. twice daily  -continue Folic acid 1 mg daily  -he gets Crizanlizumab 5 mg/kg every 3 weeks- no inpatient dosing   -pain crisis have not been well controlled  -has been in the hospital repetitively  -per  Dameon,  could try him on Radha Kelleyd, but he is not overly compliant  -IVF and dilaudid for now   - Decrease fluid rate to 75 cc/hour   -can add toradol if still not controlled- add Toradol today   - will need to discuss outpatient regimen with Dr. Lizy uGzman      Pain:  -poorly controlled at home  -MRI of his shoulder may have shown a small amount of avascular necrosis, but nothing that likely need surgery.   -MRI of the hip does not show avascular necrosis  -may need dose adjustment of home pain regimen     Anemia:  -His hemoglobin usually runs around 9  -was baseline on admission  - suspect drop to 7.9 dilutional   - AM CBC pending         ONCOLOGIC DISPOSITION: 2-3 days       Butch Mandel CNP   [facility]   Please contact through [address]

## 2021-05-25 VITALS
TEMPERATURE: 98.3 F | DIASTOLIC BLOOD PRESSURE: 61 MMHG | WEIGHT: 189.15 LBS | HEART RATE: 65 BPM | SYSTOLIC BLOOD PRESSURE: 109 MMHG | BODY MASS INDEX: 28.67 KG/M2 | RESPIRATION RATE: 16 BRPM | OXYGEN SATURATION: 100 % | HEIGHT: 68 IN

## 2021-05-25 LAB
A/G RATIO: 1.3 (ref 1.1–2.2)
ALBUMIN SERPL-MCNC: 3.7 G/DL (ref 3.4–5)
ALP BLD-CCNC: 65 U/L (ref 40–129)
ALT SERPL-CCNC: 12 U/L (ref 10–40)
ANION GAP SERPL CALCULATED.3IONS-SCNC: 5 MMOL/L (ref 3–16)
AST SERPL-CCNC: 22 U/L (ref 15–37)
BASOPHILS ABSOLUTE: 0.1 K/UL (ref 0–0.2)
BASOPHILS RELATIVE PERCENT: 0.4 %
BILIRUB SERPL-MCNC: 2.3 MG/DL (ref 0–1)
BUN BLDV-MCNC: 6 MG/DL (ref 7–20)
CALCIUM SERPL-MCNC: 9.2 MG/DL (ref 8.3–10.6)
CHLORIDE BLD-SCNC: 110 MMOL/L (ref 99–110)
CO2: 28 MMOL/L (ref 21–32)
CREAT SERPL-MCNC: 0.7 MG/DL (ref 0.9–1.3)
EOSINOPHILS ABSOLUTE: 0.3 K/UL (ref 0–0.6)
EOSINOPHILS RELATIVE PERCENT: 2.1 %
GFR AFRICAN AMERICAN: >60
GFR NON-AFRICAN AMERICAN: >60
GLOBULIN: 2.9 G/DL
GLUCOSE BLD-MCNC: 103 MG/DL (ref 70–99)
HCT VFR BLD CALC: 22.9 % (ref 40.5–52.5)
HEMOGLOBIN: 8 G/DL (ref 13.5–17.5)
IMMATURE RETIC FRACT: 0.58 (ref 0.21–0.37)
LYMPHOCYTES ABSOLUTE: 3.3 K/UL (ref 1–5.1)
LYMPHOCYTES RELATIVE PERCENT: 23.3 %
MCH RBC QN AUTO: 36.8 PG (ref 26–34)
MCHC RBC AUTO-ENTMCNC: 35.1 G/DL (ref 31–36)
MCV RBC AUTO: 104.7 FL (ref 80–100)
MONOCYTES ABSOLUTE: 1.2 K/UL (ref 0–1.3)
MONOCYTES RELATIVE PERCENT: 8.8 %
NEUTROPHILS ABSOLUTE: 9.1 K/UL (ref 1.7–7.7)
NEUTROPHILS RELATIVE PERCENT: 65.4 %
PDW BLD-RTO: 19.8 % (ref 12.4–15.4)
PLATELET # BLD: 456 K/UL (ref 135–450)
PMV BLD AUTO: 8.6 FL (ref 5–10.5)
POTASSIUM SERPL-SCNC: 3.7 MMOL/L (ref 3.5–5.1)
RBC # BLD: 2.19 M/UL (ref 4.2–5.9)
RETICULOCYTE ABSOLUTE COUNT: 0.13 M/UL
RETICULOCYTE COUNT PCT: 5.79 % (ref 0.5–2.18)
SODIUM BLD-SCNC: 143 MMOL/L (ref 136–145)
TOTAL PROTEIN: 6.6 G/DL (ref 6.4–8.2)
WBC # BLD: 14 K/UL (ref 4–11)

## 2021-05-25 PROCEDURE — 36415 COLL VENOUS BLD VENIPUNCTURE: CPT

## 2021-05-25 PROCEDURE — 80053 COMPREHEN METABOLIC PANEL: CPT

## 2021-05-25 PROCEDURE — 85045 AUTOMATED RETICULOCYTE COUNT: CPT

## 2021-05-25 PROCEDURE — 2580000003 HC RX 258: Performed by: NURSE PRACTITIONER

## 2021-05-25 PROCEDURE — 2700000000 HC OXYGEN THERAPY PER DAY

## 2021-05-25 PROCEDURE — 6360000002 HC RX W HCPCS: Performed by: INTERNAL MEDICINE

## 2021-05-25 PROCEDURE — 6370000000 HC RX 637 (ALT 250 FOR IP): Performed by: INTERNAL MEDICINE

## 2021-05-25 PROCEDURE — 6360000002 HC RX W HCPCS: Performed by: NURSE PRACTITIONER

## 2021-05-25 PROCEDURE — 85025 COMPLETE CBC W/AUTO DIFF WBC: CPT

## 2021-05-25 PROCEDURE — 94761 N-INVAS EAR/PLS OXIMETRY MLT: CPT

## 2021-05-25 RX ADMIN — HYDROXYUREA 1000 MG: 500 CAPSULE ORAL at 08:24

## 2021-05-25 RX ADMIN — OXYCODONE HYDROCHLORIDE 20 MG: 20 TABLET, FILM COATED, EXTENDED RELEASE ORAL at 08:25

## 2021-05-25 RX ADMIN — KETOROLAC TROMETHAMINE 30 MG: 30 INJECTION, SOLUTION INTRAMUSCULAR; INTRAVENOUS at 11:35

## 2021-05-25 RX ADMIN — DOCUSATE SODIUM 100 MG: 100 CAPSULE, LIQUID FILLED ORAL at 08:25

## 2021-05-25 RX ADMIN — FAMOTIDINE 20 MG: 20 TABLET ORAL at 08:25

## 2021-05-25 RX ADMIN — Medication: at 10:43

## 2021-05-25 RX ADMIN — DEXTROSE AND SODIUM CHLORIDE: 5; 450 INJECTION, SOLUTION INTRAVENOUS at 06:42

## 2021-05-25 RX ADMIN — FOLIC ACID 1 MG: 1 TABLET ORAL at 08:25

## 2021-05-25 RX ADMIN — KETOROLAC TROMETHAMINE 30 MG: 30 INJECTION, SOLUTION INTRAMUSCULAR; INTRAVENOUS at 04:29

## 2021-05-25 ASSESSMENT — PAIN SCALES - GENERAL
PAINLEVEL_OUTOF10: 4
PAINLEVEL_OUTOF10: 4
PAINLEVEL_OUTOF10: 7
PAINLEVEL_OUTOF10: 5
PAINLEVEL_OUTOF10: 5
PAINLEVEL_OUTOF10: 7
PAINLEVEL_OUTOF10: 7

## 2021-05-25 NOTE — DISCHARGE INSTR - COC
Continuity of Care Form    Patient Name: Richelle Bill   :  2000  MRN:  [id number]    Admit date:  2021  Discharge date:  ***    Code Status Order: Full Code   Advance Directives:   Advance Care Flowsheet Documentation       Date/Time Healthcare Directive Type of Healthcare Directive Copy in [address] Agent's Name Healthcare Agent's Phone Number    21 0010  No, patient does not have an advance directive for healthcare treatment -- -- -- -- --            Admitting Physician:  Nohemy Phoenix MD  PCP: Kim Richardson MD    Discharging Nurse: [facility] Unit/Room#: 3641/2712-85  Discharging Unit Phone Number: ***    Emergency Contact:   Extended Emergency Contact Information  Primary Emergency Contact: Esdras [address] [facility] Phone: [phone]  Relation: Parent   needed?  No  Secondary Emergency Contact: Daisha Cat  [place] Phone: [phone]  Relation: Parent    Past Surgical History:  Past Surgical History:   Procedure Laterality Date    SPLENECTOMY         Immunization History:   Immunization History   Administered Date(s) Administered    DTaP, 5 Pertussis Antigens (Daptacel) 1999, 2000, 2001, 10/05/2004, 10/28/2011    Hepatitis A 04/15/2011, 10/28/2011    Influenza Vaccine, unspecified formulation 2012, 2017, 2019    MMR 2000, 10/05/2004    Meningococcal B, Recombinant Hardeep Jimenez) 2018, 2018, 2019    Meningococcal MCV4P (Menactra) 04/15/2011, 2016    Pneumococcal Conjugate 13-valent (Nrtails44) 04/15/2011    Pneumococcal Polysaccharide (Vmixcmcec37) 2011    Tdap (Boostrix, Adacel) 10/28/2011, 2013    Varicella (Varivax) 10/05/2004, 04/15/2011       Active Problems:  Patient Active Problem List   Diagnosis Code    Sickle cell pain crisis ([facility] D57.00    Asthma J45.909    Sickle cell crisis ([facility] D57.00    Sepsis ([address] A41.9    Opiate overdose ([facility] T40.601A    Opiate antagonist poisoning, accidental or unintentional, initial encounter T50.7X1A    Leukocytosis D72.829    Hypoxia R09.02    Anemia D64.9    Other chest pain R07.89    Pneumonia due to infectious organism J18.9    Fever R50.9    Chronic prescription opiate use Z79.891       Isolation/Infection:   Isolation            Chemo          Patient Infection Status       Infection Onset Added Last Indicated Last Indicated By Review Planned Expiration Resolved Resolved By    None active    Resolved    COVID-19 Rule Out 01/10/21 01/10/21 01/10/21 COVID-19 (Ordered)   01/11/21 Rule-Out Test Resulted    COVID-19 Rule Out 01/05/21 01/05/21 01/05/21 COVID-19 (Ordered)   01/05/21 Rule-Out Test Resulted    COVID-19 Rule Out 12/05/20 12/05/20 12/05/20 COVID-19 (Ordered)   12/06/20 Rule-Out Test Resulted    COVID-19 Rule Out 12/01/20 12/01/20 12/01/20 COVID-19 (Ordered)   12/01/20 Rule-Out Test Resulted    COVID-19 Rule Out 08/06/20 08/06/20 08/11/20 COVID-19 (Ordered)   08/12/20 Rule-Out Test Resulted            Nurse Assessment:  Last Vital Signs: /61   Pulse 65   Temp 98.3 °F (36.8 °C) (Oral)   Resp 16   Ht 5' 8\" (1.727 m)   Wt 189 lb 2.5 oz (85.8 kg)   SpO2 100%   BMI 28.76 kg/m²     Last documented pain score (0-10 scale): Pain Level: 4  Last Weight:   Wt Readings from Last 1 Encounters:   05/23/21 189 lb 2.5 oz (85.8 kg)     Mental Status:  {IP PT MENTAL STATUS:20030:::0}    IV Access:  { DANIS IV ACCESS:517092879:::0}    Nursing Mobility/ADLs:  Walking   {CHP DME ADLs:996258151:::0}  Transfer  {CHP DME ADLs:113245365:::0}  Bathing  {CHP DME ADLs:571961225:::0}  Dressing  {CHP DME ADLs:052387366:::0}  Toileting  {CHP DME ADLs:230848558:::0}  Feeding  {CHP DME ADLs:277535098:::0}  Med Admin  {CHP DME ADLs:452269690:::0}  Med Delivery   {[facility] MED Delivery:312321310:::0}    Wound Care Documentation and Therapy:        Elimination:  Continence:    Bowel: {YES / KO:71090}  Bladder: {YES / {Prognosis:3403946000:::0}    Condition at Discharge: Brandon Waldron Patient Condition:119375552:::0}    Rehab Potential (if transferring to Rehab): {Prognosis:8986581862:::0}    Recommended Labs or Other Treatments After Discharge: ***    Physician Certification: I certify the above information and transfer of Keira Holcomb  is necessary for the continuing treatment of the diagnosis listed and that he requires {Admit to Appropriate Level of Care:55534:::0} for {GREATER/LESS:214288735} 30 days.      Update Admission H&P: {CHP DME Changes in HandP:690141255:::0}    PHYSICIAN SIGNATURE:  {Esignature:725818937:::0}

## 2021-05-25 NOTE — PLAN OF CARE
Pt a/o, rates pain appropriately using 0-10 pain scale; pt using PCA as needed for pain intervention; will continue to monitor pain and respiratory status.

## 2021-05-25 NOTE — PROGRESS NOTES
Wasted 30mL dilaudid PCA with [place], RN. [address] [place] [address] RN: verified waste of 30 mL Dilaudid on 5/25/21 @ 9171.

## 2021-05-25 NOTE — PLAN OF CARE
Problem: Pain:  Goal: Pain level will decrease  Description: Pain level will decrease  Note: Scheduled pain medications given, vitals monitored Q2, pt encouraged to reposition bed with pillow support.

## 2021-05-25 NOTE — PROGRESS NOTES
apparent distress   EYES: pupils equal, round and reactive to light, sclera clear and conjunctiva normal  ENT: Normocephalic, without obvious abnormality, atraumatic  NECK: supple, symmetrical, no jugular venous distension and no carotid bruits   HEMATOLOGIC/LYMPHATIC: no cervical, supraclavicular or axillary lymphadenopathy   LUNGS: no increased work of breathing and clear to auscultation   CARDIOVASCULAR: regular rate and rhythm, normal S1 and S2, no murmur noted  ABDOMEN: normal bowel sounds x 4, soft, non-distended, non-tender, no masses palpated, no hepatosplenomgaly   MUSCULOSKELETAL: full range of motion noted, tone is normal  NEUROLOGIC: awake, alert, oriented to name, place and time. Motor skills grossly intact. SKIN: Normal skin color, texture, turgor and no jaundice.  appears intact   EXTREMITIES: no LE edema       Data      Recent Labs     05/23/21  0753 05/24/21  1710 05/25/21  0546   WBC 11.9* 12.1* 14.0*   HGB 7.9* 9.7* 8.0*   HCT 23.3* 28.0* 22.9*    519* 456*   .6* 105.1* 104.7*        Recent Labs     05/23/21  0754 05/24/21  1326 05/25/21  0546    140 143   K 3.6 3.9 3.7    107 110   CO2 26 26 28   BUN 8 6* 6*   CREATININE 0.6* 0.6* 0.7*     Recent Labs     05/22/21  1920 05/24/21  1326 05/25/21  0546   AST 38* 27 22   ALT 18 13 12   BILITOT 4.2* 2.5* 2.3*   ALKPHOS 96 71 65       Magnesium:    Lab Results   Component Value Date    MG 1.80 05/05/2021    MG 1.80 05/04/2021    MG 1.90 01/12/2021         Problem List  Patient Active Problem List   Diagnosis    Sickle cell pain crisis ([facility]    Asthma    Sickle cell crisis ([facility]    Sepsis ([facility]    Opiate overdose ([facility]    Opiate antagonist poisoning, accidental or unintentional, initial encounter    Leukocytosis    Hypoxia    Anemia    Other chest pain    Pneumonia due to infectious organism    Fever    Chronic prescription opiate use       ASSESSMENT AND PLAN:         Sickle cell crisis:  -continue Hydrea 1000 mg

## 2021-05-25 NOTE — CARE COORDINATION
Chart reviewed by . Triggered by less than 30 day readmission. Repeat encounters:     5/3-5/5 5/14-5/18 5/22- present    Per provider note (Hem/onc), pt is \"not overly compliant\". Recurrent admissions for pain control. Hem/onc note indicates 2-3 day LOS. NN identified by  at this time.

## 2021-05-25 NOTE — PROGRESS NOTES
Pt did not want to go down to lobby in wheel chair. Charge RN said since he is a/o he can walk himself out. Pt stable and denied needs.

## 2021-05-31 ENCOUNTER — HOSPITAL ENCOUNTER (EMERGENCY)
Age: 21
Discharge: HOME | End: 2021-05-31
Attending: EMERGENCY MEDICINE
Payer: COMMERCIAL

## 2021-05-31 ENCOUNTER — APPOINTMENT (OUTPATIENT)
Dept: GENERAL RADIOLOGY | Age: 21
End: 2021-05-31
Payer: COMMERCIAL

## 2021-05-31 VITALS
SYSTOLIC BLOOD PRESSURE: 123 MMHG | WEIGHT: 180 LBS | DIASTOLIC BLOOD PRESSURE: 70 MMHG | TEMPERATURE: 99.1 F | HEIGHT: 68 IN | HEART RATE: 68 BPM | BODY MASS INDEX: 27.28 KG/M2 | RESPIRATION RATE: 15 BRPM | OXYGEN SATURATION: 100 %

## 2021-05-31 DIAGNOSIS — G89.29 CHRONIC RIGHT SHOULDER PAIN: Primary | ICD-10-CM

## 2021-05-31 DIAGNOSIS — M25.559 CHRONIC HIP PAIN, UNSPECIFIED LATERALITY: ICD-10-CM

## 2021-05-31 DIAGNOSIS — G89.29 CHRONIC HIP PAIN, UNSPECIFIED LATERALITY: ICD-10-CM

## 2021-05-31 DIAGNOSIS — Z86.2 HISTORY OF SICKLE CELL ANEMIA: ICD-10-CM

## 2021-05-31 DIAGNOSIS — M25.511 CHRONIC RIGHT SHOULDER PAIN: Primary | ICD-10-CM

## 2021-05-31 LAB
A/G RATIO: 1.3 (ref 1.1–2.2)
ALBUMIN SERPL-MCNC: 4.2 G/DL (ref 3.4–5)
ALP BLD-CCNC: 77 U/L (ref 40–129)
ALT SERPL-CCNC: 20 U/L (ref 10–40)
ANION GAP SERPL CALCULATED.3IONS-SCNC: 9 MMOL/L (ref 3–16)
ANISOCYTOSIS: ABNORMAL
AST SERPL-CCNC: 31 U/L (ref 15–37)
BASOPHILIC STIPPLING: ABNORMAL
BASOPHILS ABSOLUTE: 0 K/UL (ref 0–0.2)
BASOPHILS RELATIVE PERCENT: 0 %
BILIRUB SERPL-MCNC: 3.4 MG/DL (ref 0–1)
BUN BLDV-MCNC: 8 MG/DL (ref 7–20)
CALCIUM SERPL-MCNC: 9.8 MG/DL (ref 8.3–10.6)
CHLORIDE BLD-SCNC: 107 MMOL/L (ref 99–110)
CO2: 26 MMOL/L (ref 21–32)
CREAT SERPL-MCNC: 0.6 MG/DL (ref 0.9–1.3)
EOSINOPHILS ABSOLUTE: 0.3 K/UL (ref 0–0.6)
EOSINOPHILS RELATIVE PERCENT: 2 %
GFR AFRICAN AMERICAN: >60
GFR NON-AFRICAN AMERICAN: >60
GLOBULIN: 3.2 G/DL
GLUCOSE BLD-MCNC: 97 MG/DL (ref 70–99)
HCT VFR BLD CALC: 24.5 % (ref 40.5–52.5)
HEMATOLOGY PATH CONSULT: NO
HEMOGLOBIN: 8.4 G/DL (ref 13.5–17.5)
IMMATURE RETIC FRACT: 0.6 (ref 0.21–0.37)
LYMPHOCYTES ABSOLUTE: 3.8 K/UL (ref 1–5.1)
LYMPHOCYTES RELATIVE PERCENT: 30 %
MACROCYTES: ABNORMAL
MCH RBC QN AUTO: 36.6 PG (ref 26–34)
MCHC RBC AUTO-ENTMCNC: 34.4 G/DL (ref 31–36)
MCV RBC AUTO: 106.4 FL (ref 80–100)
MONOCYTES ABSOLUTE: 1.4 K/UL (ref 0–1.3)
MONOCYTES RELATIVE PERCENT: 11 %
NEUTROPHILS ABSOLUTE: 7.2 K/UL (ref 1.7–7.7)
NEUTROPHILS RELATIVE PERCENT: 57 %
NUCLEATED RED BLOOD CELLS: 1 /100 WBC
OVALOCYTES: ABNORMAL
PDW BLD-RTO: 22 % (ref 12.4–15.4)
PLATELET # BLD: 498 K/UL (ref 135–450)
PLATELET SLIDE REVIEW: ABNORMAL
PMV BLD AUTO: 8.3 FL (ref 5–10.5)
POIKILOCYTES: ABNORMAL
POLYCHROMASIA: ABNORMAL
POTASSIUM REFLEX MAGNESIUM: 4.1 MMOL/L (ref 3.5–5.1)
RBC # BLD: 2.3 M/UL (ref 4.2–5.9)
RETICULOCYTE ABSOLUTE COUNT: 0.2 M/UL
RETICULOCYTE COUNT PCT: 8.78 % (ref 0.5–2.18)
SICKLE CELLS: ABNORMAL
SODIUM BLD-SCNC: 142 MMOL/L (ref 136–145)
SPECIMEN STATUS: NORMAL
TARGET CELLS: ABNORMAL
TEAR DROP CELLS: ABNORMAL
TOTAL PROTEIN: 7.4 G/DL (ref 6.4–8.2)
TROPONIN: <0.01 NG/ML
WBC # BLD: 12.6 K/UL (ref 4–11)

## 2021-05-31 PROCEDURE — 96376 TX/PRO/DX INJ SAME DRUG ADON: CPT

## 2021-05-31 PROCEDURE — 80053 COMPREHEN METABOLIC PANEL: CPT

## 2021-05-31 PROCEDURE — 6360000002 HC RX W HCPCS: Performed by: EMERGENCY MEDICINE

## 2021-05-31 PROCEDURE — 2580000003 HC RX 258: Performed by: EMERGENCY MEDICINE

## 2021-05-31 PROCEDURE — 84484 ASSAY OF TROPONIN QUANT: CPT

## 2021-05-31 PROCEDURE — 71046 X-RAY EXAM CHEST 2 VIEWS: CPT

## 2021-05-31 PROCEDURE — 85025 COMPLETE CBC W/AUTO DIFF WBC: CPT

## 2021-05-31 PROCEDURE — 99285 EMERGENCY DEPT VISIT HI MDM: CPT

## 2021-05-31 PROCEDURE — 73030 X-RAY EXAM OF SHOULDER: CPT

## 2021-05-31 PROCEDURE — 85045 AUTOMATED RETICULOCYTE COUNT: CPT

## 2021-05-31 PROCEDURE — 96374 THER/PROPH/DIAG INJ IV PUSH: CPT

## 2021-05-31 RX ORDER — 0.9 % SODIUM CHLORIDE 0.9 %
1000 INTRAVENOUS SOLUTION INTRAVENOUS ONCE
Status: COMPLETED | OUTPATIENT
Start: 2021-05-31 | End: 2021-05-31

## 2021-05-31 RX ADMIN — SODIUM CHLORIDE 1000 ML: 9 INJECTION, SOLUTION INTRAVENOUS at 04:12

## 2021-05-31 RX ADMIN — Medication 1 MG: at 06:30

## 2021-05-31 RX ADMIN — HYDROMORPHONE HYDROCHLORIDE 1 MG: 1 INJECTION, SOLUTION INTRAMUSCULAR; INTRAVENOUS; SUBCUTANEOUS at 04:12

## 2021-05-31 ASSESSMENT — PAIN DESCRIPTION - ORIENTATION
ORIENTATION: RIGHT

## 2021-05-31 ASSESSMENT — PAIN DESCRIPTION - PAIN TYPE
TYPE: ACUTE PAIN;CHRONIC PAIN
TYPE: ACUTE PAIN
TYPE: ACUTE PAIN;CHRONIC PAIN
TYPE: ACUTE PAIN

## 2021-05-31 ASSESSMENT — PAIN SCALES - GENERAL
PAINLEVEL_OUTOF10: 9
PAINLEVEL_OUTOF10: 8
PAINLEVEL_OUTOF10: 9

## 2021-05-31 ASSESSMENT — PAIN DESCRIPTION - LOCATION
LOCATION: SHOULDER

## 2021-05-31 ASSESSMENT — PAIN DESCRIPTION - DESCRIPTORS: DESCRIPTORS: SHARP

## 2021-05-31 ASSESSMENT — PAIN DESCRIPTION - FREQUENCY: FREQUENCY: CONTINUOUS

## 2021-05-31 NOTE — ED PROVIDER NOTES
Patient was signed out to me at 6:30 AM.  At time of signout, discharge/potential admission pending. Physical exam: General: Has a history of sickle cell disease. He has been seen and evaluated frequently for shoulder/hip pain. Patient takes home narcotics but states that they are not helping with his pain. Patient denies chest pain, or shortness of breath. No fevers, chills, or sweats. Physical exam: General: No acute distress. Moderate discomfort. Head: Normocephalic/atraumatic. Heart: Regular rate and rhythm peer normal S1-S2. Lungs: Clear to auscultation bilaterally no wheeze, rales, rhonchi. Abdomen: Soft. Nontender, nondistended. No rebound, guarding. Musculoskeletal: Diffuse tenderness of the right shoulder. No gross deformity. Distal pulses and sensation intact. Rechecks: Physical assessment performed. Patient provided 2 doses of pain medication she states are not improving his symptoms. Consult placed to hospitalist as well as hematology/oncology. I discussed patient with Dr. Asya Lawson from hematology. He was familiar with patient. I discussed patient's current symptoms and work-up as well as recent work-ups/admission. Hematology recommends against further narcotics and admission at this time. It is recommended the patient follow-up as an outpatient. ED COURSE/MDM  Patient seen and evaluated. Old records reviewed. Labs and imaging reviewed and results discussed with patient. Patient was given multiple doses of narcotics in the ED with good symptomatic relief. Patient was reassessed as noted above . No acute pathology was noted and pt is safe for discharge home to follow up with hematology/oncology. . Plan of care discussed with patient and family. Patient and family in agreement with plan. Patient was given scripts for the following medications. I counseled patient how to take these medications.    Discharge Medication List as of 5/31/2021  8:22 AM CLINICAL IMPRESSION  1. Chronic right shoulder pain    2. Chronic hip pain, unspecified laterality    3. History of sickle cell anemia        Blood pressure 123/70, pulse 68, temperature 99.1 °F (37.3 °C), temperature source Oral, resp. rate 15, height 5' 8\" (1.727 m), weight 180 lb (81.6 kg), SpO2 100 %. 12 [facility] was discharged to home in stable condition.         Ari Henning DO  05/31/21 5778

## 2021-05-31 NOTE — ED TRIAGE NOTES
Presents with c/o right shoulder pain that started approx 1am. States took oxycodone 10 MG with no relief. Upon arrival grabbing at right shoulder. Moaning.  Skin warm pink and dry respirations easy and unlabored

## 2021-05-31 NOTE — ED PROVIDER NOTES
[address] [facility]  ED PROVIDER NOTE    Patient Identification  Pt Name: Kalani Jenkins  MRN: [id number]  [place]fsara 2000  Date of evaluation: 5/31/2021  Provider: Jenn Doran MD  PCP: Eugene Marcano MD    Chief Complaint  Sickle Cell Pain Crisis      HPI  History provided by patient   This is a 23 y.o. male who presents to the ED for right shoulder pain. Ongoing since 1 in the morning. Has had this multiple times in the past.  Thinks it may be secondary to his sickle cell crisis. Denies trauma. No numbness or tingling. No chest pain or shortness of breath. No fevers or chills. No nausea. Symptoms are severe in intensity. Took oxycodone 10 mg without relief. .     ROS  10 systems reviewed, pertinent positives/negatives per HPI otherwise noted to be negative. 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 TAKE WITH ENOUGH WATER TO SWALLOW WHOLE. DO NOT CRUSH/DISSOLVE/CHEW/CUT/BREAK. OXYCODONE 5 MG CAPSULE    Take 10 mg by mouth every 4 hours as needed for Pain. Social history:  reports that he has never smoked. He has never used smokeless tobacco. He reports previous alcohol use. He reports that he does not use drugs. Family history:  No family history on file.       Exam  ED Triage Vitals [05/31/21 0345]   BP Temp Temp Source Pulse Resp SpO2 Height Weight   112/66 99.1 °F (37.3 °C) Oral 81 15 97 % 5' 8\" (1.727 m) 180 lb (81.6 kg)     Nursing note and vitals reviewed. Constitutional: In no acute distress  HENT:      Head: Normocephalic      Ears: External ears normal.      Nose: Nose normal.     Mouth: Membrane mucosa moist   Eyes: No discharge. Neck: Supple. Trachea midline. Cardiovascular: Regular rate. Warm extremities  Pulmonary/Chest: Effort normal. No respiratory distress. Abdominal: Soft. No distension. Nontender  : Deferred  Rectal: Deferred   Musculoskeletal: Moves all extremities. No gross deformity. Neurological: Alert and oriented. Face symmetric. Speech is clear. Skin: Warm and dry. Psychiatric: Normal mood and affect. Behavior is normal.    Procedures          Radiology  XR SHOULDER RIGHT (MIN 2 VIEWS)   Final Result   Unremarkable radiographic views of the right shoulder. XR CHEST (2 VW)   Final Result   Mild cardiomegaly.       Otherwise, unremarkable radiographic views of the chest.             Labs  Results for orders placed or performed during the hospital encounter of 05/31/21   Reticulocytes   Result Value Ref Range    Retic Ct Pct 8.78 (H) 0.50 - 2.18 %    Retic Ct Abs 0.203 M/uL    Immature Retic Fract 0.60 (H) 0.21 - 0.37    Hematocrit 24.5 (L) 40.5 - 52.5 %   CBC Auto Differential   Result Value Ref Range    WBC 12.6 (H) 4.0 - 11.0 K/uL    RBC 2.30 (L) 4.20 - 5.90 M/uL    Hemoglobin 8.4 (L) 13.5 - 17.5 g/dL    .4 (H) 80.0 - 100.0 fL    MCH 36.6 (H) 26.0 - 34.0 pg    MCHC 34.4 31.0 - 36.0 g/dL    RDW 22.0 (H) 12.4 - 15.4 %    Platelets 401 (H) 129 - 450 K/uL    MPV 8.3 5.0 - 10.5 fL    PLATELET SLIDE REVIEW Increased     Path Consult No     Neutrophils % 57.0 %    Lymphocytes % 30.0 %    Monocytes % 11.0 %    Eosinophils % 2.0 %    Basophils % 0.0 %    Neutrophils Absolute 7.2 1.7 - 7.7 K/uL    Lymphocytes Absolute 3.8 1.0 - 5.1 K/uL    Monocytes Absolute 1.4 (H) 0.0 - 1.3 K/uL    Eosinophils Absolute 0.3 0.0 - 0.6 K/uL    Basophils Absolute 0.0 0.0 - 0.2 K/uL    nRBC 1 (A) /100 WBC Anisocytosis 3+ (A)     Macrocytes 2+ (A)     Polychromasia Occasional (A)     Poikilocytes 2+ (A)     Ovalocytes Occasional (A)     Target Cells Occasional (A)     Tear Drop Cells Occasional (A)     Basophilic Stippling Occasional (A)     Sickle Cells 1+ (A)    Comprehensive Metabolic Panel w/ Reflex to MG   Result Value Ref Range    Sodium 142 136 - 145 mmol/L    Potassium reflex Magnesium 4.1 3.5 - 5.1 mmol/L    Chloride 107 99 - 110 mmol/L    CO2 26 21 - 32 mmol/L    Anion Gap 9 3 - 16    Glucose 97 70 - 99 mg/dL    BUN 8 7 - 20 mg/dL    CREATININE 0.6 (L) 0.9 - 1.3 mg/dL    GFR Non-African American >60 >60    GFR African American >60 >60    Calcium 9.8 8.3 - 10.6 mg/dL    Total Protein 7.4 6.4 - 8.2 g/dL    Albumin 4.2 3.4 - 5.0 g/dL    Albumin/Globulin Ratio 1.3 1.1 - 2.2    Total Bilirubin 3.4 (H) 0.0 - 1.0 mg/dL    Alkaline Phosphatase 77 40 - 129 U/L    ALT 20 10 - 40 U/L    AST 31 15 - 37 U/L    Globulin 3.2 g/dL   Troponin   Result Value Ref Range    Troponin <0.01 <0.01 ng/mL   Sample possible blood bank testing   Result Value Ref Range    Specimen Status GIANLUCA        Screenings           MDM and ED Course  This is a 23 y.o. male who presents to the ED for right shoulder pain. May be secondary to sickle cell crisis. Avascular necrosis also in differential.  Lower likelihood for septic joint given he has had this multiple times in the past.  We will start with IV fluids and Dilaudid.    ---------    Received multiple doses of IV pain meds. Will admit for IV pain control. [unfilled]    Final Impression  1. Sickle cell crisis (HCC)        Blood pressure (!) 102/59, pulse 63, temperature 99.1 °F (37.3 °C), temperature source Oral, resp. rate 16, height 5' 8\" (1.727 m), weight 180 lb (81.6 kg), SpO2 97 %. Disposition:  DISPOSITION Decision To Admit 05/31/2021 06:54:32 AM      Patient Referrals:  No follow-up provider specified.     Discharge Medications:  New Prescriptions    No medications on file       Discontinued Medications:  Discontinued Medications    No medications on file       This chart was generated using the [address] [place] [address] [facility] dictation system. I created this record but it may contain dictation errors given the limitations of this technology.         Edu Rocha MD  05/31/21 8987

## 2021-05-31 NOTE — ED NOTES
--Patient provided with discharge instructions. --Instructions, and follow-up appointments reviewed with patient/family. No further questions or needs at this time. --Vital signs and patient stable upon discharge. --Patient ambulatory to [facility]. --Patient being driven home by parents.       Jose Antonio Leonard RN  05/31/21 8163

## 2021-05-31 NOTE — ED NOTES
Ps oncology @ 0987   RE: Dameon-MANAS crisis  Dr. Misa Teixeira @ [address] [facility]  05/31/21 8833

## 2021-05-31 NOTE — ED NOTES
Attempted to discharge patient however patient is refusing to call a ride to drive him home and refusing to be discharged until speaking to physician.       Virginia Baumann RN  05/31/21 2387

## 2021-05-31 NOTE — ED NOTES
Bed: 11  Expected date:   Expected time:   Means of arrival:   Comments:  [address] [place], RN  05/31/21 9241

## 2021-06-07 NOTE — DISCHARGE SUMMARY
Hospital Medicine Discharge Summary    Patient ID: [facility]      Patient's PCP: Carlos Eduardo Parisi MD    Admit Date: 5/22/2021     Discharge Date: 5/25/2021      Admitting Physician: Yissel Corbett MD     Discharge Physician: CARMELITA Jain - CNP     Discharge Diagnoses: Active Hospital Problems    Diagnosis     Sickle cell pain crisis ([address] [D57.00]        The patient was seen and examined on day of discharge and this discharge summary is in conjunction with any daily progress note from day of discharge. Hospital Course:     22 yo male admitted for [facility] followed by Oncology team  Sickle cell crisis:  -continue Hydrea 1000 mg p.o. twice daily  -continue Folic acid 1 mg daily  -he gets Crizanlizumab 5 mg/kg every 3 weeks- no inpatient dosing   -pain crisis have not been well controlled  -has been in the hospital repetitively  -per Dr. Pamela Moon try him on Lanetta Holes, but he is not overly compliant  -IVF and dilaudid for now   - Decrease fluid rate to 75 cc/hour   -Toradol has been added and his pain is improved     Pain:  -poorly controlled at home  -MRI of his shoulder may have shown a small amount of avascular necrosis, but nothing that likely need surgery. -MRI of the hip does not show avascular necrosis  -I have asked the patient to bring his family in for a consultation about his pain medication. I do not think he regulates it well and possibly has a low pain tolerance. I have talked to him about also taking Tylenol and Motrin to help with his pain. He does not seem overly interested. He states that his parents take half of his pain medication and set it aside and then ration it. Frankly, I am not sure he does a good job managing it and it is hard to be critical of this except that he is an adult. He has had 1 near accidental overdose. Also, I talked him about adding Cymbalta to help with his pain, but we could not come to the resolution.   His mother was on the

## 2021-06-09 ENCOUNTER — APPOINTMENT (OUTPATIENT)
Dept: GENERAL RADIOLOGY | Age: 21
DRG: 812 | End: 2021-06-09
Payer: COMMERCIAL

## 2021-06-09 ENCOUNTER — HOSPITAL ENCOUNTER (INPATIENT)
Age: 21
LOS: 3 days | Discharge: HOME | DRG: 812 | End: 2021-06-14
Attending: EMERGENCY MEDICINE | Admitting: INTERNAL MEDICINE
Payer: COMMERCIAL

## 2021-06-09 DIAGNOSIS — D57.00 SICKLE CELL CRISIS (HCC): Primary | ICD-10-CM

## 2021-06-09 LAB
A/G RATIO: 1.2 (ref 1.1–2.2)
ALBUMIN SERPL-MCNC: 4.4 G/DL (ref 3.4–5)
ALP BLD-CCNC: 90 U/L (ref 40–129)
ALT SERPL-CCNC: 33 U/L (ref 10–40)
ANION GAP SERPL CALCULATED.3IONS-SCNC: 9 MMOL/L (ref 3–16)
AST SERPL-CCNC: 56 U/L (ref 15–37)
BACTERIA: ABNORMAL /HPF
BASOPHILS ABSOLUTE: 0.2 K/UL (ref 0–0.2)
BASOPHILS RELATIVE PERCENT: 1.6 %
BILIRUB SERPL-MCNC: 4.3 MG/DL (ref 0–1)
BILIRUBIN URINE: NEGATIVE
BLOOD, URINE: ABNORMAL
BUN BLDV-MCNC: 9 MG/DL (ref 7–20)
CALCIUM SERPL-MCNC: 9.9 MG/DL (ref 8.3–10.6)
CHLORIDE BLD-SCNC: 106 MMOL/L (ref 99–110)
CLARITY: CLEAR
CO2: 26 MMOL/L (ref 21–32)
COLOR: YELLOW
CREAT SERPL-MCNC: 0.7 MG/DL (ref 0.9–1.3)
EOSINOPHILS ABSOLUTE: 0.1 K/UL (ref 0–0.6)
EOSINOPHILS RELATIVE PERCENT: 1.3 %
GFR AFRICAN AMERICAN: >60
GFR NON-AFRICAN AMERICAN: >60
GLOBULIN: 3.6 G/DL
GLUCOSE BLD-MCNC: 97 MG/DL (ref 70–99)
GLUCOSE URINE: NEGATIVE MG/DL
HCT VFR BLD CALC: 24.4 % (ref 40.5–52.5)
HEMOGLOBIN: 8.7 G/DL (ref 13.5–17.5)
IMMATURE RETIC FRACT: 0.64 (ref 0.21–0.37)
KETONES, URINE: NEGATIVE MG/DL
LEUKOCYTE ESTERASE, URINE: NEGATIVE
LYMPHOCYTES ABSOLUTE: 1.5 K/UL (ref 1–5.1)
LYMPHOCYTES RELATIVE PERCENT: 13.3 %
MCH RBC QN AUTO: 35.3 PG (ref 26–34)
MCHC RBC AUTO-ENTMCNC: 35.5 G/DL (ref 31–36)
MCV RBC AUTO: 99.4 FL (ref 80–100)
MICROSCOPIC EXAMINATION: YES
MONOCYTES ABSOLUTE: 1.3 K/UL (ref 0–1.3)
MONOCYTES RELATIVE PERCENT: 10.7 %
NEUTROPHILS ABSOLUTE: 8.5 K/UL (ref 1.7–7.7)
NEUTROPHILS RELATIVE PERCENT: 73.1 %
NITRITE, URINE: NEGATIVE
PDW BLD-RTO: 21.2 % (ref 12.4–15.4)
PH UA: 7 (ref 5–8)
PLATELET # BLD: 384 K/UL (ref 135–450)
PMV BLD AUTO: 8.8 FL (ref 5–10.5)
POTASSIUM REFLEX MAGNESIUM: 5 MMOL/L (ref 3.5–5.1)
PROTEIN UA: NEGATIVE MG/DL
RBC # BLD: 2.45 M/UL (ref 4.2–5.9)
RBC UA: ABNORMAL /HPF (ref 0–4)
RETICULOCYTE ABSOLUTE COUNT: 0.35 M/UL
RETICULOCYTE COUNT PCT: 14.41 % (ref 0.5–2.18)
SODIUM BLD-SCNC: 141 MMOL/L (ref 136–145)
SPECIFIC GRAVITY UA: 1.01 (ref 1–1.03)
SPECIMEN STATUS: NORMAL
TOTAL PROTEIN: 8 G/DL (ref 6.4–8.2)
URINE REFLEX TO CULTURE: ABNORMAL
URINE TYPE: ABNORMAL
UROBILINOGEN, URINE: 0.2 E.U./DL
WBC # BLD: 11.7 K/UL (ref 4–11)
WBC UA: ABNORMAL /HPF (ref 0–5)

## 2021-06-09 PROCEDURE — 96374 THER/PROPH/DIAG INJ IV PUSH: CPT

## 2021-06-09 PROCEDURE — 80053 COMPREHEN METABOLIC PANEL: CPT

## 2021-06-09 PROCEDURE — 6370000000 HC RX 637 (ALT 250 FOR IP): Performed by: EMERGENCY MEDICINE

## 2021-06-09 PROCEDURE — 83615 LACTATE (LD) (LDH) ENZYME: CPT

## 2021-06-09 PROCEDURE — 81001 URINALYSIS AUTO W/SCOPE: CPT

## 2021-06-09 PROCEDURE — 86900 BLOOD TYPING SEROLOGIC ABO: CPT

## 2021-06-09 PROCEDURE — 72072 X-RAY EXAM THORAC SPINE 3VWS: CPT

## 2021-06-09 PROCEDURE — 85025 COMPLETE CBC W/AUTO DIFF WBC: CPT

## 2021-06-09 PROCEDURE — 86850 RBC ANTIBODY SCREEN: CPT

## 2021-06-09 PROCEDURE — 86901 BLOOD TYPING SEROLOGIC RH(D): CPT

## 2021-06-09 PROCEDURE — 96372 THER/PROPH/DIAG INJ SC/IM: CPT

## 2021-06-09 PROCEDURE — 6360000002 HC RX W HCPCS: Performed by: EMERGENCY MEDICINE

## 2021-06-09 PROCEDURE — 36415 COLL VENOUS BLD VENIPUNCTURE: CPT

## 2021-06-09 PROCEDURE — 85660 RBC SICKLE CELL TEST: CPT

## 2021-06-09 PROCEDURE — 99285 EMERGENCY DEPT VISIT HI MDM: CPT

## 2021-06-09 PROCEDURE — 83010 ASSAY OF HAPTOGLOBIN QUANT: CPT

## 2021-06-09 PROCEDURE — 86902 BLOOD TYPE ANTIGEN DONOR EA: CPT

## 2021-06-09 PROCEDURE — 86923 COMPATIBILITY TEST ELECTRIC: CPT

## 2021-06-09 PROCEDURE — P9016 RBC LEUKOCYTES REDUCED: HCPCS

## 2021-06-09 PROCEDURE — 72100 X-RAY EXAM L-S SPINE 2/3 VWS: CPT

## 2021-06-09 PROCEDURE — 85045 AUTOMATED RETICULOCYTE COUNT: CPT

## 2021-06-09 RX ORDER — KETOROLAC TROMETHAMINE 30 MG/ML
30 INJECTION, SOLUTION INTRAMUSCULAR; INTRAVENOUS ONCE
Status: DISCONTINUED | OUTPATIENT
Start: 2021-06-09 | End: 2021-06-09

## 2021-06-09 RX ORDER — KETOROLAC TROMETHAMINE 30 MG/ML
30 INJECTION, SOLUTION INTRAMUSCULAR; INTRAVENOUS ONCE
Status: COMPLETED | OUTPATIENT
Start: 2021-06-09 | End: 2021-06-09

## 2021-06-09 RX ORDER — OXYCODONE AND ACETAMINOPHEN 7.5; 325 MG/1; MG/1
1 TABLET ORAL ONCE
Status: DISCONTINUED | OUTPATIENT
Start: 2021-06-09 | End: 2021-06-09

## 2021-06-09 RX ORDER — ACETAMINOPHEN 500 MG
1000 TABLET ORAL ONCE
Status: COMPLETED | OUTPATIENT
Start: 2021-06-09 | End: 2021-06-09

## 2021-06-09 RX ORDER — CYCLOBENZAPRINE HCL 10 MG
10 TABLET ORAL ONCE
Status: DISCONTINUED | OUTPATIENT
Start: 2021-06-09 | End: 2021-06-09

## 2021-06-09 RX ADMIN — HYDROMORPHONE HYDROCHLORIDE 1 MG: 1 INJECTION, SOLUTION INTRAMUSCULAR; INTRAVENOUS; SUBCUTANEOUS at 21:55

## 2021-06-09 RX ADMIN — ACETAMINOPHEN 1000 MG: 500 TABLET ORAL at 23:51

## 2021-06-09 RX ADMIN — HYDROMORPHONE HYDROCHLORIDE 0.5 MG: 1 INJECTION, SOLUTION INTRAMUSCULAR; INTRAVENOUS; SUBCUTANEOUS at 23:51

## 2021-06-09 RX ADMIN — KETOROLAC TROMETHAMINE 30 MG: 30 INJECTION, SOLUTION INTRAMUSCULAR at 21:54

## 2021-06-09 ASSESSMENT — ENCOUNTER SYMPTOMS
SHORTNESS OF BREATH: 0
ABDOMINAL PAIN: 0
BACK PAIN: 1
RHINORRHEA: 0

## 2021-06-09 ASSESSMENT — PAIN DESCRIPTION - PAIN TYPE
TYPE: ACUTE PAIN
TYPE: ACUTE PAIN

## 2021-06-09 ASSESSMENT — PAIN DESCRIPTION - PROGRESSION
CLINICAL_PROGRESSION: NOT CHANGED
CLINICAL_PROGRESSION: NOT CHANGED

## 2021-06-09 ASSESSMENT — PAIN SCALES - GENERAL
PAINLEVEL_OUTOF10: 9
PAINLEVEL_OUTOF10: 9
PAINLEVEL_OUTOF10: 10
PAINLEVEL_OUTOF10: 9
PAINLEVEL_OUTOF10: 9

## 2021-06-09 ASSESSMENT — PAIN DESCRIPTION - LOCATION: LOCATION: BACK

## 2021-06-09 ASSESSMENT — PAIN DESCRIPTION - DESCRIPTORS: DESCRIPTORS: DISCOMFORT;CONSTANT

## 2021-06-09 ASSESSMENT — PAIN DESCRIPTION - ORIENTATION: ORIENTATION: LOWER;UPPER

## 2021-06-10 ENCOUNTER — APPOINTMENT (OUTPATIENT)
Dept: GENERAL RADIOLOGY | Age: 21
DRG: 812 | End: 2021-06-10
Payer: COMMERCIAL

## 2021-06-10 LAB
HAPTOGLOBIN: <10 MG/DL (ref 30–200)
LACTATE DEHYDROGENASE: 567 U/L (ref 100–190)

## 2021-06-10 PROCEDURE — 6370000000 HC RX 637 (ALT 250 FOR IP): Performed by: INTERNAL MEDICINE

## 2021-06-10 PROCEDURE — 2580000003 HC RX 258: Performed by: INTERNAL MEDICINE

## 2021-06-10 PROCEDURE — G0378 HOSPITAL OBSERVATION PER HR: HCPCS

## 2021-06-10 PROCEDURE — 71045 X-RAY EXAM CHEST 1 VIEW: CPT

## 2021-06-10 PROCEDURE — 6360000002 HC RX W HCPCS: Performed by: INTERNAL MEDICINE

## 2021-06-10 PROCEDURE — 6360000002 HC RX W HCPCS: Performed by: NURSE PRACTITIONER

## 2021-06-10 PROCEDURE — 96376 TX/PRO/DX INJ SAME DRUG ADON: CPT

## 2021-06-10 RX ORDER — MAGNESIUM SULFATE IN WATER 40 MG/ML
2000 INJECTION, SOLUTION INTRAVENOUS PRN
Status: DISCONTINUED | OUTPATIENT
Start: 2021-06-10 | End: 2021-06-14 | Stop reason: HOSPADM

## 2021-06-10 RX ORDER — HYDROMORPHONE HCL 110MG/55ML
0.5 PATIENT CONTROLLED ANALGESIA SYRINGE INTRAVENOUS EVERY 4 HOURS PRN
Status: DISCONTINUED | OUTPATIENT
Start: 2021-06-10 | End: 2021-06-10

## 2021-06-10 RX ORDER — SODIUM CHLORIDE 0.9 % (FLUSH) 0.9 %
3 SYRINGE (ML) INJECTION PRN
Status: DISCONTINUED | OUTPATIENT
Start: 2021-06-10 | End: 2021-06-14 | Stop reason: HOSPADM

## 2021-06-10 RX ORDER — OXYCODONE HYDROCHLORIDE 5 MG/1
10 TABLET ORAL EVERY 4 HOURS PRN
Status: COMPLETED | OUTPATIENT
Start: 2021-06-10 | End: 2021-06-11

## 2021-06-10 RX ORDER — HYDROXYUREA 500 MG/1
1000 CAPSULE ORAL 2 TIMES DAILY
Status: DISCONTINUED | OUTPATIENT
Start: 2021-06-10 | End: 2021-06-14 | Stop reason: HOSPADM

## 2021-06-10 RX ORDER — ACETAMINOPHEN 650 MG/1
650 SUPPOSITORY RECTAL EVERY 6 HOURS PRN
Status: DISCONTINUED | OUTPATIENT
Start: 2021-06-10 | End: 2021-06-14 | Stop reason: HOSPADM

## 2021-06-10 RX ORDER — PROMETHAZINE HYDROCHLORIDE 25 MG/1
12.5 TABLET ORAL EVERY 6 HOURS PRN
Status: DISCONTINUED | OUTPATIENT
Start: 2021-06-10 | End: 2021-06-14 | Stop reason: HOSPADM

## 2021-06-10 RX ORDER — FOLIC ACID 1 MG/1
1 TABLET ORAL DAILY
Status: DISCONTINUED | OUTPATIENT
Start: 2021-06-10 | End: 2021-06-14 | Stop reason: HOSPADM

## 2021-06-10 RX ORDER — HYDROMORPHONE HCL 110MG/55ML
0.5 PATIENT CONTROLLED ANALGESIA SYRINGE INTRAVENOUS ONCE
Status: COMPLETED | OUTPATIENT
Start: 2021-06-10 | End: 2021-06-10

## 2021-06-10 RX ORDER — ONDANSETRON 2 MG/ML
4 INJECTION INTRAMUSCULAR; INTRAVENOUS EVERY 6 HOURS PRN
Status: DISCONTINUED | OUTPATIENT
Start: 2021-06-10 | End: 2021-06-14 | Stop reason: HOSPADM

## 2021-06-10 RX ORDER — DEXTROSE AND SODIUM CHLORIDE 5; .45 G/100ML; G/100ML
INJECTION, SOLUTION INTRAVENOUS CONTINUOUS
Status: DISCONTINUED | OUTPATIENT
Start: 2021-06-10 | End: 2021-06-10

## 2021-06-10 RX ORDER — FAMOTIDINE 20 MG/1
20 TABLET, FILM COATED ORAL 2 TIMES DAILY
Status: DISCONTINUED | OUTPATIENT
Start: 2021-06-10 | End: 2021-06-14 | Stop reason: HOSPADM

## 2021-06-10 RX ORDER — LIDOCAINE 50 MG/G
OINTMENT TOPICAL EVERY 30 MIN PRN
Status: DISCONTINUED | OUTPATIENT
Start: 2021-06-10 | End: 2021-06-14 | Stop reason: HOSPADM

## 2021-06-10 RX ORDER — OXYCODONE HYDROCHLORIDE 5 MG/1
5 TABLET ORAL EVERY 4 HOURS PRN
Status: DISCONTINUED | OUTPATIENT
Start: 2021-06-10 | End: 2021-06-14 | Stop reason: HOSPADM

## 2021-06-10 RX ORDER — HYDROMORPHONE HCL 110MG/55ML
1 PATIENT CONTROLLED ANALGESIA SYRINGE INTRAVENOUS
Status: DISCONTINUED | OUTPATIENT
Start: 2021-06-10 | End: 2021-06-11

## 2021-06-10 RX ORDER — ACETAMINOPHEN 325 MG/1
650 TABLET ORAL EVERY 6 HOURS PRN
Status: DISCONTINUED | OUTPATIENT
Start: 2021-06-10 | End: 2021-06-14 | Stop reason: HOSPADM

## 2021-06-10 RX ORDER — SODIUM CHLORIDE 9 MG/ML
INJECTION, SOLUTION INTRAVENOUS CONTINUOUS
Status: DISCONTINUED | OUTPATIENT
Start: 2021-06-10 | End: 2021-06-14 | Stop reason: HOSPADM

## 2021-06-10 RX ORDER — POTASSIUM CHLORIDE 7.45 MG/ML
10 INJECTION INTRAVENOUS PRN
Status: DISCONTINUED | OUTPATIENT
Start: 2021-06-10 | End: 2021-06-14 | Stop reason: HOSPADM

## 2021-06-10 RX ADMIN — DEXTROSE AND SODIUM CHLORIDE: 5; 450 INJECTION, SOLUTION INTRAVENOUS at 04:27

## 2021-06-10 RX ADMIN — HYDROMORPHONE HYDROCHLORIDE 1 MG: 2 INJECTION, SOLUTION INTRAMUSCULAR; INTRAVENOUS; SUBCUTANEOUS at 11:42

## 2021-06-10 RX ADMIN — Medication 3 ML: at 21:31

## 2021-06-10 RX ADMIN — HYDROXYUREA 1000 MG: 500 CAPSULE ORAL at 19:55

## 2021-06-10 RX ADMIN — HYDROMORPHONE HYDROCHLORIDE 1 MG: 2 INJECTION, SOLUTION INTRAMUSCULAR; INTRAVENOUS; SUBCUTANEOUS at 15:13

## 2021-06-10 RX ADMIN — FAMOTIDINE 20 MG: 20 TABLET ORAL at 08:58

## 2021-06-10 RX ADMIN — OXYCODONE HYDROCHLORIDE 10 MG: 5 TABLET ORAL at 04:53

## 2021-06-10 RX ADMIN — HYDROMORPHONE HYDROCHLORIDE 0.5 MG: 2 INJECTION, SOLUTION INTRAMUSCULAR; INTRAVENOUS; SUBCUTANEOUS at 08:59

## 2021-06-10 RX ADMIN — HYDROMORPHONE HYDROCHLORIDE 0.5 MG: 2 INJECTION, SOLUTION INTRAMUSCULAR; INTRAVENOUS; SUBCUTANEOUS at 05:23

## 2021-06-10 RX ADMIN — SODIUM CHLORIDE: 9 INJECTION, SOLUTION INTRAVENOUS at 08:58

## 2021-06-10 RX ADMIN — HYDROMORPHONE HYDROCHLORIDE 1 MG: 2 INJECTION, SOLUTION INTRAMUSCULAR; INTRAVENOUS; SUBCUTANEOUS at 21:30

## 2021-06-10 RX ADMIN — FAMOTIDINE 20 MG: 20 TABLET ORAL at 19:49

## 2021-06-10 RX ADMIN — OXYCODONE HYDROCHLORIDE 10 MG: 5 TABLET ORAL at 19:49

## 2021-06-10 RX ADMIN — HYDROMORPHONE HYDROCHLORIDE 1 MG: 2 INJECTION, SOLUTION INTRAMUSCULAR; INTRAVENOUS; SUBCUTANEOUS at 18:15

## 2021-06-10 RX ADMIN — FOLIC ACID 1 MG: 1 TABLET ORAL at 08:58

## 2021-06-10 ASSESSMENT — PAIN SCALES - GENERAL
PAINLEVEL_OUTOF10: 10
PAINLEVEL_OUTOF10: 10
PAINLEVEL_OUTOF10: 9
PAINLEVEL_OUTOF10: 10
PAINLEVEL_OUTOF10: 10
PAINLEVEL_OUTOF10: 9
PAINLEVEL_OUTOF10: 7
PAINLEVEL_OUTOF10: 10

## 2021-06-10 ASSESSMENT — PAIN DESCRIPTION - LOCATION: LOCATION: BACK

## 2021-06-10 ASSESSMENT — PAIN DESCRIPTION - PROGRESSION: CLINICAL_PROGRESSION: NOT CHANGED

## 2021-06-10 ASSESSMENT — PAIN DESCRIPTION - FREQUENCY: FREQUENCY: CONTINUOUS

## 2021-06-10 ASSESSMENT — PAIN DESCRIPTION - PAIN TYPE: TYPE: ACUTE PAIN

## 2021-06-10 ASSESSMENT — PAIN DESCRIPTION - DESCRIPTORS: DESCRIPTORS: ACHING;STABBING

## 2021-06-10 ASSESSMENT — PAIN DESCRIPTION - ORIENTATION: ORIENTATION: LOWER;UPPER

## 2021-06-10 ASSESSMENT — PAIN DESCRIPTION - ONSET: ONSET: ON-GOING

## 2021-06-10 NOTE — ED NOTES
Rounding Note:  Resting quietly at this time with eyes closed and no complaints. Side rails up and call light within reach. Respirations even and unlabored. Offered oral pain meds ordered by inpatient doctor. Patient states Stacy Burns will wait for now\"  Awaiting inpatient bed. Pt informed and plan of care reviewed. Pt acknowledges understanding and voices agreement.      Sang Ovalle RN  06/10/21 8005

## 2021-06-10 NOTE — CARE COORDINATION
Chart review- IPTA, admitted OBS; Sickle cell crisis. Frequent hospital admissions for pain control. No needs for dc per nurse Edis Najera.

## 2021-06-10 NOTE — CONSULTS
Hematology/Oncology Consultation         Patient:   [facility]       Reason for Consult:  Sickle cell crisis   Requesting Physician: No ref. provider found    Chief Complaint:     Chief Complaint   Patient presents with    Back Pain     Pt reports upper/ lower back pain starting 2 days ago. Pt reports hx of sickle cell. Pt denies any know injury to back. Pt took 1 percocet PTA with no pain relief. History Obtained from:     patient, electronic medical record    History of Present Illness:     [facility] is a 23 y.o. male  with significant past medical history of sickle cell anemia who presents with back pain in sickle cell crisis. He rates back pain as 9/10. Able to walk, no falls, no incontinence. NO HA. No chest pain or SOB. Hgb 8.7 at baseline. No fevers and VSS.      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)     Sickle cell anemia (HCC)        Past Surgical History:         Procedure Laterality Date    SPLENECTOMY         Current Medications:     Current Facility-Administered Medications   Medication Dose Route Frequency Provider Last Rate Last Admin    lidocaine (XYLOCAINE) 5 % ointment   Topical Q30 Min PRN Georgina Carr, DO        sodium chloride flush 0.9 % injection 3 mL  3 mL Intravenous PRN Jd Carr, DO        folic acid (FOLVITE) tablet 1 mg  1 mg Oral Daily Georgina Carr, DO   1 mg at 06/10/21 0858    oxyCODONE (ROXICODONE) immediate release tablet 5 mg  5 mg Oral Q4H PRN Jd Carr, DO        oxyCODONE (ROXICODONE) immediate release tablet 10 mg  10 mg Oral Q4H PRN Georgina Carr, DO   10 mg at 06/10/21 0453    potassium chloride 10 mEq/100 mL IVPB (Peripheral Line)  10 mEq Intravenous PRN Jd Carr, DO        magnesium sulfate 2000 mg in 50 mL IVPB premix  2,000 mg Intravenous PRN Jd Carr, DO        promethazine (PHENERGAN) tablet 12.5 mg  12.5 mg Oral Q6H PRN Georgina Carr, DO        Or    ondansetron (ZOFRAN) injection 4 mg  4 mg Intravenous Q6H PRN Bakari Carr, DO        acetaminophen (TYLENOL) tablet 650 mg  650 mg Oral Q6H PRN Ahmad A Lilly, DO        Or    acetaminophen (TYLENOL) suppository 650 mg  650 mg Rectal Q6H PRN Giovannid LETICIA Carr, DO        famotidine (PEPCID) tablet 20 mg  20 mg Oral BID Ahmad LETICIA Carr, DO   20 mg at 06/10/21 0858    0.9 % sodium chloride infusion   Intravenous Continuous Delfina Gonzáles  mL/hr at 06/10/21 0858 New Bag at 06/10/21 0858    hydroxyurea (HYDREA) chemo capsule 1,000 mg  1,000 mg Oral BID Delfina Gonzáles MD        enoxaparin (LOVENOX) injection 40 mg  40 mg Subcutaneous Daily Delfina Gonzáles MD        HYDROmorphone (DILAUDID) injection 1 mg  1 mg Intravenous Q3H PRN CARMELITA Ortiz - CNP   1 mg at 06/10/21 1142       Allergies:      Allergies   Allergen Reactions    Morphine Shortness Of Breath     Other reaction(s): Histamine-like Reaction  Has asthma exacerbation with morphine-histamine type reaction         Social History:     Social History     Socioeconomic History    Marital status: Single     Spouse name: Not on file    Number of children: 0    Years of education: Not on file    Highest education level: Not on file   Occupational History    Not on file   Tobacco Use    Smoking status: Never Smoker    Smokeless tobacco: Never Used   Vaping Use    Vaping Use: Never used   Substance and Sexual Activity    Alcohol use: Not Currently    Drug use: Never    Sexual activity: Not Currently   Other Topics Concern    Not on file   Social History Narrative    Not on file     Social Determinants of Health     Financial Resource Strain:     Difficulty of Paying Living Expenses:    Food Insecurity:     Worried About Running Out of Food in the Last Year:     [address] in the Last Year:    Transportation Needs:     Lack of Transportation (Medical):  Lack of Transportation (Non-Medical):    Physical Activity:     Days of Exercise per Week:     Minutes of Exercise per Session:    Stress:     Feeling of Stress :    Social Connections:     Frequency of Communication with Friends and Family:     Frequency of Social Gatherings with Friends and Family:     Attends [employer] Services:     Active Member of Clubs or Organizations:     Attends Club or Organization Meetings:     Marital Status:    Intimate Partner Violence:     Fear of Current or Ex-Partner:     Emotionally Abused:     Physically Abused:     Sexually Abused:      Social History     Substance and Sexual Activity   Drug Use Never     Social History     Substance and Sexual Activity   Alcohol Use Not Currently     Social History     Substance and Sexual Activity   Sexual Activity Not Currently     Social History     Tobacco Use   Smoking Status Never Smoker   Smokeless Tobacco Never Used       Family History:     History reviewed. No pertinent family history. Review of Systems:     Constitutional: Denies fever, sweats, weight loss. .     Eyes: No visual changes or diplopia. No scleral icterus. ENT: No Headaches, no hearing loss, no  vertigo. No mouth sores or sore throat. Cardiovascular: No chest pain, no dyspnea on exertion, no palpitations, no  loss of consciousness. Respiratory: No cough, no  wheezing, no dyspnea, no sputum production. No hemoptysis. .    Gastrointestinal: No abdominal pain, no appetite loss, no blood in stools. No change in bowel habits. Genitourinary: No dysuria, trouble voiding, or hematuria. Musculoskeletal:  Generalized weakness. No joint complaints. Integumentary: No rash or pruritis. Neurological: No headache, diplopia. No change in gait, balance, or coordination. No paresthesias. Endocrine: No temperature intolerance. No excessive thirst, fluid intake, or urination.    Hematologic/Lymphatic: No abnormal bruising or ecchymoses, no blood clots or swollen lymph nodes. Allergic/Immunologic: No nasal congestion or hives. Physical Exam:     /70   Pulse 74   Temp 97.8 °F (36.6 °C) (Oral)   Resp 16   Ht 5' 8\" (1.727 m)   Wt 180 lb (81.6 kg)   SpO2 97%   BMI 27.37 kg/m²     CONSTITUTIONAL: awake, alert, cooperative, no apparent distress. EYES:  Pupils equal, round and reactive to light, sclera non-icteric, conjunctiva normal  ENT:  Normocephalic, without obvious abnormality, atraumatic, sinuses nontender on palpation, external ears without lesions, oral pharynx with moist mucus membranes, no mucositis. NECK:  Supple, symmetrical, trachea midline, no adenopathy, thyroid symmetric, not enlarged and no tenderness, skin normal  HEMATOLOGIC/LYMPHATICS:  no cervical lymphadenopathy, no supraclavicular lymphadenopathy, no axillary lymphadenopathy and no inguinal lymphadenopathy  BACK:  Symmetric, no curvature, spinous processes are non-tender on palpation, paraspinous muscles are non-tender on palpation, no costal vertebral tenderness  LUNGS:  Clear to auscultation bilaterally, no crackles or wheezing  CARDIOVASCULAR:  Regular rate and rhythm, normal S1 and S2, no S3 or S4, and no murmur noted  ABDOMEN:  Normal bowel sounds, soft, non-distended, non-tender, no masses palpated, no hepatosplenomegally  MUSCULOSKELETAL:  There is no redness, warmth, or swelling of the joints  NEUROLOGIC:   No focal findings. SKIN:  Scattered bruising and ecchymoses. EXT: without clubbing, cyanosis or edema.       Data:     CBC:  Recent Labs     06/09/21 2143 05/31/21  0350 05/25/21  0546   WBC 11.7* 12.6* 14.0*   HGB 8.7* 8.4* 8.0*   HCT 24.4* 24.5* 22.9*   MCV 99.4 106.4* 104.7*    498* 456*       BMP:  Recent Labs     06/09/21 2050 05/31/21  0350 05/25/21  0546    142 143   K 5.0 4.1 3.7   CO2 26 26 28   BUN 9 8 6*   CREATININE 0.7* 0.6* 0.7*       HEPATIC:  Recent Labs     06/09/21  2200 06/09/21 2050 05/31/21  0350 05/25/21  0546 05/14/21  2355   AST  --  56* 31 22 54*   ALT  --  33 20 12 49*   ALKPHOS  --  90 77 65 102   PROT  --  8.0 7.4 6.6 8.1   BILITOT  --  4.3* 3.4* 2.3* 4.4*   *  --   --   --  766*       TUMOR MARKERS:  No results for input(s): PSA, CEA, , VA5130,  in the last 720 hours. MAGNESIUM:    Lab Results   Component Value Date    MG 1.80 05/05/2021       PT/INR:    No results found for: PTINR    Lab Results   Component Value Date    INR 1.44 08/06/2020       PTT:    No results found for: APTT    U/A:    Lab Results   Component Value Date    COLORU Yellow 06/09/2021    PHUR 7.0 06/09/2021    WBCUA None seen 06/09/2021    RBCUA 0-2 06/09/2021    MUCUS 2+ 12/01/2020    BACTERIA Rare 06/09/2021    CLARITYU Clear 06/09/2021    SPECGRAV 1.010 06/09/2021    LEUKOCYTESUR Negative 06/09/2021    UROBILINOGEN 0.2 06/09/2021    BILIRUBINUR Negative 06/09/2021    BLOODU TRACE-INTACT 06/09/2021    GLUCOSEU Negative 06/09/2021    AMORPHOUS Rare 01/05/2021       Imaging:       EXAMINATION:   THREE XRAY VIEWS OF THE THORACIC SPINE       6/9/2021 9:25 pm       COMPARISON:   Chest radiograph 05/31/2021       HISTORY:   ORDERING SYSTEM PROVIDED HISTORY: sickle cell, pain   TECHNOLOGIST PROVIDED HISTORY:   Reason for exam:->sickle cell, pain   Reason for Exam: back pain   Acuity: Unknown   Type of Exam: Initial       FINDINGS:   The vertebral body heights are maintained.  No fracture identified.  The   visualized lung fields reveal no acute findings.           Impression   No acute osseous abnormality identified. Impression:     Sickle cell crisis   Back pain     Plan:     Cont Hydrea   Hgb at baseline- no PRBC today   Cont IV hydration   Dilaudid increased to 1 mg every 3 hours PRN   Oxycodone 10 mg PRN   Back pain- neuro exam intact. Hold on MRI imaging for now- XRAY of T and L spine was unremarkable for acute fracture. Does not want Lidoderm patch.      Likely home in the AM               Thank you very much for allowing me to participate in the care of this patient.     Butch Mandel CNP   Medical Oncology/Hematology    [facility] - [address],  [address]  Phone: [phone]  Fax: [phone]

## 2021-06-10 NOTE — ED PROVIDER NOTES
[address]      Pt Name: Shannon Cartagena  MRN: [id number]  [place] 2000  Date of evaluation: 6/9/2021  Provider: Joy Rosario MD    CHIEF COMPLAINT       Chief Complaint   Patient presents with    Back Pain     Pt reports upper/ lower back pain starting 2 days ago. Pt reports hx of sickle cell. Pt denies any know injury to back. Pt took 1 percocet PTA with no pain relief. HISTORY OF PRESENT ILLNESS   (Location/Symptom, Timing/Onset,Context/Setting, Quality, Duration, Modifying Factors, Severity)  Note limiting factors. Shannon Cartagena is a 23 y.o. male who presents to the emergency department for back pain. The patient has a history of sickle cell disease. He has been having pain over his upper and lower spine for about 2 days. He has tried taking his Percocets at home with no improvement of his symptoms this required a visit to the emergency room. He denies any neurologic symptoms no urologic symptoms. He has been working out but he does not feel like this is muscular in nature and he has tried a muscle relaxant with no improvement. Nursing notes were reviewed. REVIEW OF SYSTEMS    (2-9 systems for level 4, 10 or more for level 5)     Review of Systems   Constitutional: Negative for fever. HENT: Negative for rhinorrhea. Eyes: Negative for visual disturbance. Respiratory: Negative for shortness of breath. Cardiovascular: Negative for chest pain. Gastrointestinal: Negative for abdominal pain. Genitourinary: Negative for difficulty urinating and dysuria. Musculoskeletal: Positive for back pain. Skin: Negative for wound. Neurological: Negative for speech difficulty, weakness and headaches. Hematological: Does not bruise/bleed easily.          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)     Sickle cell anemia (HCC)          SURGICALHISTORY       Past Surgical History:   Procedure Laterality Date    SPLENECTOMY           CURRENT MEDICATIONS       Current Discharge Medication List      CONTINUE these medications which have NOT CHANGED    Details   oxyCODONE (OXYCONTIN) 20 MG extended release tablet TAKE 1 TABLET BY MOUTH EVERY 12 HOURS. TAKE WITH ENOUGH WATER TO SWALLOW WHOLE. DO NOT CRUSH/DISSOLVE/CHEW/CUT/BREAK. oxyCODONE 5 MG capsule Take 10 mg by mouth every 4 hours as needed for Pain.      hydroxyurea (HYDREA) 500 MG chemo capsule Take 2 capsules by mouth 2 times daily  Qty:               ALLERGIES     Morphine    FAMILY HISTORY     History reviewed. No pertinent family history. SOCIAL HISTORY       Social History     Socioeconomic History    Marital status: Single     Spouse name: None    Number of children: 0    Years of education: None    Highest education level: None   Occupational History    None   Tobacco Use    Smoking status: Never Smoker    Smokeless tobacco: Never Used   Vaping Use    Vaping Use: Never used   Substance and Sexual Activity    Alcohol use: Not Currently    Drug use: Never    Sexual activity: Not Currently   Other Topics Concern    None   Social History Narrative    None     Social Determinants of Health     Financial Resource Strain:     Difficulty of Paying Living Expenses:    Food Insecurity:     Worried About Running Out of Food in the Last Year:     Ran Out of Food in the Last Year:    Transportation Needs:     Lack of Transportation (Medical):      Lack of Transportation (Non-Medical):    Physical Activity:     Days of Exercise per Week:     Minutes of Exercise per Session:    Stress:     Feeling of Stress :    Social Connections:     Frequency of Communication with Friends and Family:     Frequency of Social Gatherings with Friends and Family:     Attends Orthodoxy Services:     Active Member of Clubs or Organizations:     Attends Club or Organization Meetings:     Marital Status:    Intimate Partner Violence:     Fear of Current or Ex-Partner:     Emotionally Abused:     Physically Abused:     Sexually Abused:        SCREENINGS    McCarley Coma Scale  Eye Opening: Spontaneous  Best Verbal Response: Oriented  Best Motor Response: Obeys commands  Blade Coma Scale Score: 15        PHYSICAL EXAM    (up to 7 for level 4, 8 or more for level 5)     ED Triage Vitals [06/09/21 1832]   BP Temp Temp Source Pulse Resp SpO2 Height Weight   137/79 98.8 °F (37.1 °C) Oral 86 14 94 % 5' 8\" (1.727 m) 180 lb (81.6 kg)       Physical Exam  Vitals and nursing note reviewed. Constitutional:       General: He is in acute distress. Appearance: Normal appearance. He is well-developed. He is not ill-appearing. HENT:      Head: Normocephalic and atraumatic. Right Ear: External ear normal.      Left Ear: External ear normal.      Nose: Nose normal.   Eyes:      General: No scleral icterus. Right eye: No discharge. Left eye: No discharge. Conjunctiva/sclera: Conjunctivae normal.   Cardiovascular:      Rate and Rhythm: Normal rate and regular rhythm. Heart sounds: Normal heart sounds. Pulmonary:      Effort: Pulmonary effort is normal. No respiratory distress. Breath sounds: Normal breath sounds. No wheezing or rales. Abdominal:      General: Bowel sounds are normal. There is no distension. Palpations: Abdomen is soft. Tenderness: There is no abdominal tenderness. There is no guarding or rebound. Musculoskeletal:      Cervical back: Neck supple. Skin:     Coloration: Skin is not pale. Neurological:      General: No focal deficit present. Mental Status: He is alert. Sensory: No sensory deficit. Motor: No weakness.       Coordination: Coordination normal.   Psychiatric:         Mood and Affect: Mood normal.         Behavior: Behavior normal.             DIAGNOSTIC RESULTS     EKG: All EKG's are Hematocrit 24.4 (*)     MCH 35.3 (*)     RDW 21.2 (*)     Neutrophils Absolute 8.5 (*)     All other components within normal limits    Narrative:     Performed at:  [address], [facility] [place]   Phone [phone]   RETICULOCYTES - Abnormal; Notable for the following components:    Retic Ct Pct 14.41 (*)     Immature Retic Fract 0.64 (*)     All other components within normal limits    Narrative:     Performed at:  [place] [address] [place]   Phone [phone]   LACTATE DEHYDROGENASE - Abnormal; Notable for the following components:     (*)     All other components within normal limits    Narrative:     Performed at:  [place] [address] [place]   Phone [phone] TESTING    Narrative:     Performed at:  [address], [facility] [place]   Phone [phone]   HAPTOGLOBIN   COMPREHENSIVE METABOLIC PANEL   RETICULOCYTES       All other labs were within normal range or not returned as of this dictation. EMERGENCY DEPARTMENT COURSE and DIFFERENTIAL DIAGNOSIS/MDM:   Vitals:    Vitals:    06/10/21 0230 06/10/21 0330 06/10/21 0400 06/10/21 0453   BP: 123/68 122/70 (!) 93/50 117/69   Pulse: 68 65 53    Resp:   16    Temp:   98.9 °F (37.2 °C)    TempSrc:   Oral    SpO2: 96% 97% 94%    Weight:       Height:           Adult male who comes in with back pain. He has a history of sickle cell disease and therefore an x-ray is ordered to evaluate for any acute complication. X-rays are negative. Laboratory studies ordered. He has stable anemia but he has an increase in his reticulocyte count and bilirubin.   The patient was initially given Toradol and Dilaudid for pain but upon reassessment the patient states he has not had any significant improvement in fact his pain went from a 10 to a 9. The patient is given additional Dilaudid and again no improvement at this point I believe it is in the patient's best interest to be admitted to the hospital for further management. The patient denies having had complications of his sickle cell disease. CRITICAL CARE TIME   None       CONSULTS:  IP CONSULT TO HOSPITALIST    PROCEDURES:       Procedures    FINAL IMPRESSION      1. Sickle cell crisis [facility])          DISPOSITION/PLAN   DISPOSITION Admitted 06/10/2021 01:16:26 AM      PATIENT REFERREDTO:  No follow-up provider specified.     DISCHARGEMEDICATIONS:  Current Discharge Medication List             (Please note that portions of this note were completed with a voice recognition program.  Efforts were made to edit the dictations but occasionally words are mis-transcribed.)    Jomar Wallace MD (electronically signed)  Attending Emergency Physician        Jomar Wallace MD  06/10/21 [id number]

## 2021-06-10 NOTE — PLAN OF CARE
LAST [facility] NOTE 21     Patient Name: Ha Gilliam   Patient : 2000   Patient MRN: [id number]   Primary Oncologist: Hilary Jacinto   Referring Physician:   Date of Service: 2021   Chief Complaint  Sickle cell anemia  Problem List  Sickle cell anemia   Asthma   Chronic pain   Vitamin D deficiency (disorder)    HPI  Mr Mohsen Bruce is a 20year old  man who has sickle cell anemia. He has been managed by [facility] till this time. he is maintained on hydrea but has let his prescription run out He has not required frequent transfusions . He has had pain crises which he states happen every few months. He is treated with oxycodone during these times. he had chest crisis in 2016He states he has an enlarged heart. 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 he was referred to [facility] dental clinic but I am uncertain as to whether he followed up According to notes he has not been consistent with hydrea and vitamin d replacement  Previous Therapies  see above  Current Therapy   Crizanlizumab-tmca Q28D (Part 2 of 2: Maintenance) Cycle Length: 28 Number Cycles: 12 Start: C1D1 on 01/15/2021 Assoc Dx: Sickle cell anemia LOT: Toxicity Management Treatment Intent: Vrfcjrvuel06/12/2021 C12 D1  Crizanlizumab-tmca IV, 430 mg (5 mg/kg)  Interval History     Javier returns today for an acute visit. His pain medication needs have increased. He states that he has pain in his right arm, shoulder, and whole left side of his body. He has been taking OxyContin 20 mg every 12 hours. His oxycodone is 10 mg 1 every 4-6 hours, but he states he was told a couple months ago to increase this to 20 mg every 4-6 hours. This is what he has been doing and why he is running out of his medication so soon. He states that 20 mg of oxycodone works very well for him.  He is currently out of this and has been taking Tylenol, ibuprofen, and naproxen. Tramadol has been ordered in the past, but he is no longer taking this. Review of Systems  Constitutional: No weight loss, No fever, No chills, No night sweats. Energy level good.   Eyes: No impairment or change in vision  ENT / Mouth: No pain, abnormal ulceration, bleeding, nasal drip or change in voice or hearing  Cardiovascular: No chest pain, palpitations, new edema, or calf discomfort  Respiratory: No pain, hemoptysis, change to breathing  Breast: No pain, discharge, change in appearance or texture  Gastrointestinal: No pain, cramping, jaundice, change to eating and bowel habits  Urinary: No pain, bleeding or change in continence  Genitalia: No pain, bleeding or discharge  Musculoskeletal: No redness, pain, edema or weakness  Skin: No pruritus, rash, change to nodules or lesions  Neurologic: No discomfort, change in mental status, speech, sensory or motor activity  Psychiatric: No change in concentration or change to affect or mood  Endocrine: No hot flashes, increased thirst, or change to urine production  Hematologic: No petechiae, ecchymosis or bleeding  Lymphatic: No lymphadenopathy or lymphedema  Allergy / Immunologic: No eczema, hives, frequent or recurrent infections      Vital Signs  Blood pressure: 120/78, Pulse: 72, Temperature: 98.1 F, Respirations: 8, O2 sat: , Pain Scale: 9, Height: 67.5 in, Weight: 193 lb, BSA: 2.04, BMI: 29.78 kg/m2   Physical Exam   CONSTITUTIONAL: awake, alert, cooperative, no apparent distress   EYES: pupils equal, round, sclera clear and conjunctiva normal  ENT: Normocephalic, without obvious abnormality, atraumatic  NECK: supple, symmetrical  HEMATOLOGIC/LYMPHATIC: no cervical, supraclavicular or axillary lymphadenopathy   LUNGS: no increased work of breathing and clear to auscultation   CARDIOVASCULAR: regular rate and rhythm, normal S1 and S2, no murmur noted  ABDOMEN: normal bowel sounds x 4, soft, non-distended, non-tender, no masses palpated, no hepatosplenomegaly   MUSCULOSKELETAL: full range of motion noted  NEUROLOGIC: awake, alert, oriented to name, place and time. Motor skills grossly intact. SKIN: Normal skin color, texture, turgor and no jaundice.  appears intact   EXTREMITIES: Without cyanosis, clubbing, edema or asymmetry    Labs  CBC   Lab Results 06/01/2021 04/19/2021 04/16/2021 04/05/2021 03/12/2021 02/12/2021   CBC                     WBC x 10^3/uL 18.3 (H)       13.3 (H) 15.8 (H) 8.5   RBC x 10^6/uL 2.61 (L)       2.74 (L) 2.51 (L) 2.50 (L)   HGB g/dL 9.3 (L)       9.9 (L) 9.1 (L) 9.2 (L)   HCT % 26.6 (L)       27.6 (L) 25.1 (L) 25.7 (L)   MCV fL 101.9 (H)       100.7 (H) 100.0 (H) 102.8 (H)   MCH pg 35.6 (H)       36.1 (H) 36.3 (H) 36.8 (H)   MCHC g/dL 35.0       35.9 36.3 35.8   RDW-CV, % 18.2 (H)       19.9 (H) 21.1 (H) 21.2 (H)   PLT x 10^3/uL 467.0 (H)       284.0 333.0 292.0   Mee % 80.3 (H)       75.1 (H) 63.2 61.4   LY % 10.2 (L)       11.3 (L) 20.0 (L) 22.6   MO % 8.9       10.8 12.3 (H) 12.2   EO % 0.4 (L)       1.8 3.9 3.3   BA % 0.2       1.0 0.6 0.5   Mee # (ANC) x 10^3/uL 14.65 (H)       9.99 (H) 9.99 (H) 5.21   LY # x 10^3/uL 1.87       1.50 3.17 1.91   MO # x 10^3/uL 1.63 (H)       1.44 (H) 1.95 (H) 1.03 (H)   EO # x 10^3/uL 0.07       0.24 0.62 (H) 0.28   BA # x 10^3/uL 0.04       0.13 (H) 0.09 (H) 0.04   CMP   Lab Results 06/01/2021 04/19/2021 04/16/2021 04/05/2021 03/12/2021 02/12/2021   Chemistries                     Glucose mg/dL                92   BUN mg/dL                8 (L)   Creatinine mg/dL                0.79   BUN/Creatinine ratio                10.1   Sodium mmol/L                139   Potassium mmol/L                4.6   Chloride mmol/L                106   CO2 mmol/L                26.1   Anion gap, mmol/L                6.9   Calcium mg/dL                10.1   Albumin g/dL                4.3   Total protein g/dL                7.5   A/G ratio                1.3   Bilirubin, total mg/dL                4.1 (H)   Alkaline phosphatase U/L                99   AST/SGOT U/L                49 (H)   ALT/SGPT U/L                25   GFR non-African American, estimated mL/min/1.73m2                >60.0   GFR African American, estimated mL/min/1.73m2                >60.0     Lab Results 06/01/2021 04/19/2021 04/16/2021 04/05/2021 03/12/2021 02/12/2021   Anemia Labs                     Ferritin ng/mL       1436.6 (H)            Imaging  OCM - Patient Care Management    Pain, if applicable:   Date of Service: 06/01/2021  Pain Scale (0-10): 9  Pain Treatment Plan: Use of Opioids  Comment:         Performance Status: ECOG 1 Symptoms, but ambulatory. Restricted in physically strenuous activity, but ambulatory and able to carry out work of a light or sedentary nature (e.g., light housework, office work). (Date: 06/01/2021)     Depression Status: Was screened; Outcome positive: No; Screening Date: 02/05/2021; Screening Tool: Patient Health Questionnaire (PHQ9)  Psycho-social PHQ-9 Follow-up Plan (if applicable):     Research    Would you like this patient screened for clinical trial eligibility? If yes, please enter the order for \"Research: Screen for Eligibility\"    Assessment & Plan    Sickle Cell Anemia  - Patient takes hydrea, Hb 93  - He was due for crizanlizumab-tmca weeks ago, but did not show up.  -This will be rescheduled. Pain:  - Pain contract signed 9/1/2020.   - Continue Oxycontin 20mg q12h  - He has had multiple emergency room visits and has been calling early for his medication refills.  -I reviewed the pain care plan with him.  -New prescription will be sent for oxycodone 20 mg, 1 every 4-6 hours as needed.  -A referral will also be placed for pain management.  -We discussed that we will not refill his pain medications early. He needs to take his medication as ordered. Health maintenance  - Patient has hx of previous dental infections.  Previous xray of mandible and maxilla showed abnormalities.  -He has not had any recent dental care. He will return in 1 week for his crizanlizumab infuision. Time Based Itemization    A total of  minutes was spent on today's patient encounter. If applicable, non-patient-facing activities:  ( ) Preparing to see the patient and reviewing records  ( ) Individual interpretation of results   ( ) Discussion or coordination of care with other health care professionals  ( ) Ordering of unique tests, medications, or procedures  ( ) Documentation within the EHR   . Recent imaging and labs were reviewed and discussed with the patient. Patient was instructed to stop at our  to make their next appointment before leaving. They will call in the interim with any questions/concerns/new symptoms. This plan was discussed with the patient and they verbalized understanding and acceptance of the plan. Dr. Juan F Rahman was available for consultation for this visit. I have recommended that the patient follow CDC guidelines for prevention of COVID-19 infection. Roberto Singleton, KIM, FNP-BC, AOCNP  [facility], [facility]  Phone: [phone]  Fax: [phone]  Online: [email or web address]     Note Recipients:       Note from Dr. Juan F Rahman 6-1-21    Notes  I had a very lengthy and terse discussion with the pharmacist. I was trying to refill Romina's pain medication early. Unfortunately, I have had to do this frequently. It is a delicate balance trying to keep him pain free or as pain-free as possible, yet not let him have so many pain medications in his possession that he could potentially overdose. We have worked with his parents and demanded that he see a pain specialist. I tried to explain to the pharmacist that I do not want to increase his pain medication, but I do understand that sometimes he may need more. The rationale is I do not want to give him a lot of narcotics at his disposal for which he could overdose.  I thought it was better to refill his medication early and to increase his medication, because when I do this he is likely going to requested early again. I do not want to follow in that tract. Furthermore, we have tried long-acting pain medication and he is got himself into trouble with this. I do not feel that he is diverting medication and have no evidence to that effect. I tried to explain to the pharmacist that is a physician I am in a pickle: If his pain is not well controlled, we could be accused of abandoning the patient and not treating him appropriately, if we do not give him pain medicine and he overdoses on street drugs, we could be blamed for this, and if he does divert his medication, we could be blamed for that also. Its not a Catch-22, but a catch 33 when you look at all 3 variables. I found the pharmacist to be very unreasonable after explaining this to him. In fact, I felt what he suggested to be quite dangerous and will not do it. I am not going to give [facility] an excessive number of pills to have lying around the house. I think if I offer him X number he will likely take those in 5 to 7 days and if I offered him 2 X are 3X he will take those over 5 to 7 days. I agree it is a tough situation to try to keep him as pain-free as possible and keep it as safe as possible. I do not think this pharmacist understands or is willing to be cooperative. Nikia Evans M.D. Electronically signed by Ramez Celaya.  Dameon PHELPS 06/01/2021 17:44 EDT

## 2021-06-10 NOTE — H&P
Hospital Medicine History & Physical      PCP: Bay Parikh MD    Date of Admission: 6/9/2021    Date of Service: Pt seen/examined on 6/10/2021 and Admitted to Inpatient with expected LOS greater than two midnights due to medical therapy. Chief Complaint:  Back pain       History Of Present Illness:  23 y.o. male with hx of sickle cell anemia who presented to [facility] with back pain for past 2 days which gradually worsened. He denied any recent trauma, tingling or numbness. Reports his back pain is similar to his sickle cell crisis in past. Took his chronic narcotic meds with no relief. Seen in ER and was admitted overnight for further care. Has had multiple hospitalizations for his sickle cell crises. Follows with Dr Tejas Kent. 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 Authorizing Provider   oxyCODONE (OXYCONTIN) 20 MG extended release tablet TAKE 1 TABLET BY MOUTH EVERY 12 HOURS. TAKE WITH ENOUGH WATER TO SWALLOW WHOLE. DO NOT CRUSH/DISSOLVE/CHEW/CUT/BREAK. 4/12/21  Yes Historical Provider, MD   oxyCODONE 5 MG capsule Take 10 mg by mouth every 4 hours as needed for Pain. Yes Historical Provider, MD   hydroxyurea (HYDREA) 500 MG chemo capsule Take 2 capsules by mouth 2 times daily 12/9/20  Yes Marlee Snyder MD       Allergies:  Morphine    Social History:      The patient currently lives at home     TOBACCO:   reports that he has never smoked. He has never used smokeless tobacco.  ETOH:   reports previous alcohol use.   E-Cigarettes/Vaping Use     Questions Responses    E-Cigarette/Vaping Use Never User    Start Date     Passive Exposure Quit Date     Counseling Given     Comments             Family History:      Reviewed in detail and negative for DM, CAD, Cancer, CVA. REVIEW OF SYSTEMS COMPLETED:   Pertinent positives as noted in the HPI. All other systems reviewed and negative. PHYSICAL EXAM PERFORMED:    /69   Pulse 53   Temp 98.9 °F (37.2 °C) (Oral)   Resp 16   Ht 5' 8\" (1.727 m)   Wt 180 lb (81.6 kg)   SpO2 94%   BMI 27.37 kg/m²     General appearance: In mild distress due to pain,  apparent distress, appears stated age and cooperative. HEENT:  Normal cephalic, atraumatic without obvious deformity. Pupils equal, round,  Conjunctivae/corneas clear. Neck: Supple, with full range of motion. No jugular venous distention. Trachea midline. Respiratory:  Normal respiratory effort. Clear to auscultation, bilaterally without Rales/Wheezes/Rhonchi. Cardiovascular:  Regular rate and rhythm with normal S1/S2 without murmurs, rubs or gallops. Abdomen: Soft, non-tender, non-distended with normal bowel sounds. Musculoskeletal:  No clubbing, cyanosis or edema bilaterally. Skin:  Warm and dry   Neurologic:  Neurovascularly intact without any focal sensory/motor deficits. Cranial nerves: II-XII intact, grossly non-focal.  Psychiatric:  Alert and oriented, thought content appropriate, normal insight  Back : tenderness in thoracolumbar region       Labs:     Recent Labs     06/09/21 2143   WBC 11.7*   HGB 8.7*   HCT 24.4*        Recent Labs     06/09/21 2050      K 5.0      CO2 26   BUN 9   CREATININE 0.7*   CALCIUM 9.9     Recent Labs     06/09/21 2050   AST 56*   ALT 33   BILITOT 4.3*   ALKPHOS 90     No results for input(s): INR in the last 72 hours. No results for input(s): Anjali [place] in the last 72 hours.     Urinalysis:      Lab Results   Component Value Date    NITRU Negative 06/09/2021    WBCUA None seen 06/09/2021    BACTERIA Rare 06/09/2021    RBCUA 0-2 06/09/2021    BLOODU TRACE-INTACT 06/09/2021    SPECGRAV 1.010 06/09/2021    GLUCOSEU Negative 06/09/2021       Radiology:         XR CHEST PORTABLE   Final Result   Stable appearing chest without acute cardiopulmonary process. XR THORACIC SPINE (3 VIEWS)   Final Result   No acute osseous abnormality identified. XR LUMBAR SPINE (2-3 VIEWS)   Final Result   Normal radiographic examination of the lumbar spine. ASSESSMENT AND PLAN     Active Hospital Problems    Diagnosis Date Noted    Sickle cell crisis ([facility] [address] [D57.00] 07/28/2020     Back pain: likely due to sickle crisis. Xray was non acute. Started on IV dilaudid,  oxycodone  PRN and  resume home dose of oxycontin for pain control. Sickle cell crisis :  hgb was at his baseline on presentation. Start supportive care with IVF ( NS 125cc/hr) , pain meds as started above. Resume home dose of hydrourea, folic acid. Consult hemonc     Leucocytosis : chronic on chart review. Afebrile. No overt signs of infection- UA neg forUTI and CXR non acute. Monitor. DVT Prophylaxis: lovenox   Diet: ADULT DIET; Regular  Code Status: Full Code    PT/OT Eval Status: not  Indicated at this time     Dispo -  Hard to estimate length of stay. Pending clinical course        Jose J Kwan MD    Thank you Jeremy Rosas MD for the opportunity to be involved in this patient's care. If you have any questions or concerns please feel free to contact me at [phone].

## 2021-06-11 LAB
ABO/RH: NORMAL
ANION GAP SERPL CALCULATED.3IONS-SCNC: 10 MMOL/L (ref 3–16)
ANTIBODY SCREEN: NORMAL
BASOPHILS ABSOLUTE: 0.2 K/UL (ref 0–0.2)
BASOPHILS RELATIVE PERCENT: 1.8 %
BUN BLDV-MCNC: 8 MG/DL (ref 7–20)
CALCIUM SERPL-MCNC: 8.9 MG/DL (ref 8.3–10.6)
CHLORIDE BLD-SCNC: 108 MMOL/L (ref 99–110)
CO2: 23 MMOL/L (ref 21–32)
CREAT SERPL-MCNC: 0.6 MG/DL (ref 0.9–1.3)
EOSINOPHILS ABSOLUTE: 0.2 K/UL (ref 0–0.6)
EOSINOPHILS RELATIVE PERCENT: 1.6 %
GFR AFRICAN AMERICAN: >60
GFR NON-AFRICAN AMERICAN: >60
GLUCOSE BLD-MCNC: 87 MG/DL (ref 70–99)
HCT VFR BLD CALC: 21.6 % (ref 40.5–52.5)
HEMATOLOGY PATH CONSULT: NO
HEMOGLOBIN: 7.5 G/DL (ref 13.5–17.5)
IMMATURE RETIC FRACT: 0.52 (ref 0.21–0.37)
LYMPHOCYTES ABSOLUTE: 2.4 K/UL (ref 1–5.1)
LYMPHOCYTES RELATIVE PERCENT: 21.6 %
MCH RBC QN AUTO: 34.6 PG (ref 26–34)
MCHC RBC AUTO-ENTMCNC: 34.7 G/DL (ref 31–36)
MCV RBC AUTO: 99.6 FL (ref 80–100)
MONOCYTES ABSOLUTE: 1.4 K/UL (ref 0–1.3)
MONOCYTES RELATIVE PERCENT: 12.2 %
NEUTROPHILS ABSOLUTE: 7.1 K/UL (ref 1.7–7.7)
NEUTROPHILS RELATIVE PERCENT: 62.8 %
PDW BLD-RTO: 19.8 % (ref 12.4–15.4)
PLATELET # BLD: 285 K/UL (ref 135–450)
PLATELET SLIDE REVIEW: ADEQUATE
PMV BLD AUTO: 9 FL (ref 5–10.5)
POTASSIUM SERPL-SCNC: 3.9 MMOL/L (ref 3.5–5.1)
RBC # BLD: 2.17 M/UL (ref 4.2–5.9)
RETICULOCYTE ABSOLUTE COUNT: 0.21 M/UL
RETICULOCYTE COUNT PCT: 9.56 % (ref 0.5–2.18)
SLIDE REVIEW: ABNORMAL
SODIUM BLD-SCNC: 141 MMOL/L (ref 136–145)
WBC # BLD: 11.2 K/UL (ref 4–11)

## 2021-06-11 PROCEDURE — 80048 BASIC METABOLIC PNL TOTAL CA: CPT

## 2021-06-11 PROCEDURE — 2580000003 HC RX 258: Performed by: INTERNAL MEDICINE

## 2021-06-11 PROCEDURE — 96375 TX/PRO/DX INJ NEW DRUG ADDON: CPT

## 2021-06-11 PROCEDURE — 85045 AUTOMATED RETICULOCYTE COUNT: CPT

## 2021-06-11 PROCEDURE — 36415 COLL VENOUS BLD VENIPUNCTURE: CPT

## 2021-06-11 PROCEDURE — 6370000000 HC RX 637 (ALT 250 FOR IP): Performed by: NURSE PRACTITIONER

## 2021-06-11 PROCEDURE — G0378 HOSPITAL OBSERVATION PER HR: HCPCS

## 2021-06-11 PROCEDURE — P9016 RBC LEUKOCYTES REDUCED: HCPCS

## 2021-06-11 PROCEDURE — 85025 COMPLETE CBC W/AUTO DIFF WBC: CPT

## 2021-06-11 PROCEDURE — 6370000000 HC RX 637 (ALT 250 FOR IP): Performed by: INTERNAL MEDICINE

## 2021-06-11 PROCEDURE — 6360000002 HC RX W HCPCS: Performed by: NURSE PRACTITIONER

## 2021-06-11 PROCEDURE — 1200000000 HC SEMI PRIVATE

## 2021-06-11 PROCEDURE — 36430 TRANSFUSION BLD/BLD COMPNT: CPT

## 2021-06-11 PROCEDURE — 96376 TX/PRO/DX INJ SAME DRUG ADON: CPT

## 2021-06-11 RX ORDER — ACETAMINOPHEN 325 MG/1
650 TABLET ORAL ONCE
Status: COMPLETED | OUTPATIENT
Start: 2021-06-11 | End: 2021-06-11

## 2021-06-11 RX ORDER — DIPHENHYDRAMINE HCL 25 MG
25 TABLET ORAL ONCE
Status: COMPLETED | OUTPATIENT
Start: 2021-06-11 | End: 2021-06-11

## 2021-06-11 RX ORDER — SODIUM CHLORIDE 9 MG/ML
INJECTION, SOLUTION INTRAVENOUS PRN
Status: DISCONTINUED | OUTPATIENT
Start: 2021-06-11 | End: 2021-06-14 | Stop reason: HOSPADM

## 2021-06-11 RX ORDER — HYDROMORPHONE HCL 110MG/55ML
1 PATIENT CONTROLLED ANALGESIA SYRINGE INTRAVENOUS
Status: DISCONTINUED | OUTPATIENT
Start: 2021-06-11 | End: 2021-06-14

## 2021-06-11 RX ORDER — KETOROLAC TROMETHAMINE 30 MG/ML
30 INJECTION, SOLUTION INTRAMUSCULAR; INTRAVENOUS EVERY 8 HOURS
Status: DISCONTINUED | OUTPATIENT
Start: 2021-06-11 | End: 2021-06-14 | Stop reason: HOSPADM

## 2021-06-11 RX ADMIN — HYDROMORPHONE HYDROCHLORIDE 1 MG: 2 INJECTION, SOLUTION INTRAMUSCULAR; INTRAVENOUS; SUBCUTANEOUS at 19:05

## 2021-06-11 RX ADMIN — HYDROMORPHONE HYDROCHLORIDE 1 MG: 2 INJECTION, SOLUTION INTRAMUSCULAR; INTRAVENOUS; SUBCUTANEOUS at 07:09

## 2021-06-11 RX ADMIN — HYDROXYUREA 1000 MG: 500 CAPSULE ORAL at 09:06

## 2021-06-11 RX ADMIN — HYDROMORPHONE HYDROCHLORIDE 1 MG: 2 INJECTION, SOLUTION INTRAMUSCULAR; INTRAVENOUS; SUBCUTANEOUS at 00:39

## 2021-06-11 RX ADMIN — DIPHENHYDRAMINE HCL 25 MG: 25 TABLET ORAL at 16:40

## 2021-06-11 RX ADMIN — HYDROMORPHONE HYDROCHLORIDE 1 MG: 2 INJECTION, SOLUTION INTRAMUSCULAR; INTRAVENOUS; SUBCUTANEOUS at 13:10

## 2021-06-11 RX ADMIN — FAMOTIDINE 20 MG: 20 TABLET ORAL at 09:06

## 2021-06-11 RX ADMIN — Medication 3 ML: at 23:35

## 2021-06-11 RX ADMIN — HYDROMORPHONE HYDROCHLORIDE 1 MG: 2 INJECTION, SOLUTION INTRAMUSCULAR; INTRAVENOUS; SUBCUTANEOUS at 02:53

## 2021-06-11 RX ADMIN — HYDROMORPHONE HYDROCHLORIDE 1 MG: 2 INJECTION, SOLUTION INTRAMUSCULAR; INTRAVENOUS; SUBCUTANEOUS at 17:02

## 2021-06-11 RX ADMIN — KETOROLAC TROMETHAMINE 30 MG: 30 INJECTION, SOLUTION INTRAMUSCULAR at 11:04

## 2021-06-11 RX ADMIN — Medication 3 ML: at 09:07

## 2021-06-11 RX ADMIN — FOLIC ACID 1 MG: 1 TABLET ORAL at 09:06

## 2021-06-11 RX ADMIN — OXYCODONE HYDROCHLORIDE 10 MG: 5 TABLET ORAL at 01:40

## 2021-06-11 RX ADMIN — HYDROMORPHONE HYDROCHLORIDE 1 MG: 2 INJECTION, SOLUTION INTRAMUSCULAR; INTRAVENOUS; SUBCUTANEOUS at 15:28

## 2021-06-11 RX ADMIN — HYDROMORPHONE HYDROCHLORIDE 1 MG: 2 INJECTION, SOLUTION INTRAMUSCULAR; INTRAVENOUS; SUBCUTANEOUS at 09:06

## 2021-06-11 RX ADMIN — FAMOTIDINE 20 MG: 20 TABLET ORAL at 21:22

## 2021-06-11 RX ADMIN — HYDROMORPHONE HYDROCHLORIDE 1 MG: 2 INJECTION, SOLUTION INTRAMUSCULAR; INTRAVENOUS; SUBCUTANEOUS at 21:23

## 2021-06-11 RX ADMIN — SODIUM CHLORIDE: 9 INJECTION, SOLUTION INTRAVENOUS at 04:52

## 2021-06-11 RX ADMIN — HYDROXYUREA 1000 MG: 500 CAPSULE ORAL at 21:23

## 2021-06-11 RX ADMIN — HYDROMORPHONE HYDROCHLORIDE 1 MG: 2 INJECTION, SOLUTION INTRAMUSCULAR; INTRAVENOUS; SUBCUTANEOUS at 11:04

## 2021-06-11 RX ADMIN — HYDROMORPHONE HYDROCHLORIDE 1 MG: 2 INJECTION, SOLUTION INTRAMUSCULAR; INTRAVENOUS; SUBCUTANEOUS at 23:35

## 2021-06-11 RX ADMIN — HYDROMORPHONE HYDROCHLORIDE 1 MG: 2 INJECTION, SOLUTION INTRAMUSCULAR; INTRAVENOUS; SUBCUTANEOUS at 04:47

## 2021-06-11 RX ADMIN — ACETAMINOPHEN 650 MG: 325 TABLET ORAL at 16:40

## 2021-06-11 ASSESSMENT — PAIN SCALES - GENERAL
PAINLEVEL_OUTOF10: 9
PAINLEVEL_OUTOF10: 10
PAINLEVEL_OUTOF10: 8
PAINLEVEL_OUTOF10: 8
PAINLEVEL_OUTOF10: 10
PAINLEVEL_OUTOF10: 8
PAINLEVEL_OUTOF10: 9
PAINLEVEL_OUTOF10: 9
PAINLEVEL_OUTOF10: 10
PAINLEVEL_OUTOF10: 6
PAINLEVEL_OUTOF10: 9
PAINLEVEL_OUTOF10: 10
PAINLEVEL_OUTOF10: 9
PAINLEVEL_OUTOF10: 4
PAINLEVEL_OUTOF10: 10
PAINLEVEL_OUTOF10: 8
PAINLEVEL_OUTOF10: 9
PAINLEVEL_OUTOF10: 8
PAINLEVEL_OUTOF10: 9
PAINLEVEL_OUTOF10: 9
PAINLEVEL_OUTOF10: 10
PAINLEVEL_OUTOF10: 10

## 2021-06-11 ASSESSMENT — PAIN DESCRIPTION - PAIN TYPE: TYPE: NEUROPATHIC PAIN

## 2021-06-11 ASSESSMENT — PAIN DESCRIPTION - FREQUENCY: FREQUENCY: CONTINUOUS

## 2021-06-11 ASSESSMENT — PAIN DESCRIPTION - PROGRESSION: CLINICAL_PROGRESSION: NOT CHANGED

## 2021-06-11 ASSESSMENT — PAIN DESCRIPTION - DESCRIPTORS: DESCRIPTORS: ACHING;SHOOTING;SHARP

## 2021-06-11 ASSESSMENT — PAIN DESCRIPTION - ORIENTATION: ORIENTATION: LOWER;UPPER

## 2021-06-11 ASSESSMENT — PAIN DESCRIPTION - ONSET: ONSET: ON-GOING

## 2021-06-11 ASSESSMENT — PAIN DESCRIPTION - LOCATION: LOCATION: BACK

## 2021-06-11 NOTE — PROGRESS NOTES
Hgb is 7.5.   1 unit PRBC ordered. Will add Toradol as well every 8 hours. Hopefully to dc over weekend.

## 2021-06-11 NOTE — FLOWSHEET NOTE
Pt dealing w/chronic health condition.  has visited pt in past and knows family. Very brief visit w/pt only able to speak few words. Mart Davila will follow-up later.      06/11/21 1127   Encounter Summary   Services provided to: Patient   Referral/Consult From: Maynor   Continue Visiting Yes  (6/11 - [facility] visited pt previously; pt too weak to respond)   Complexity of Encounter Low   Length of Encounter 15 minutes   Routine   Type Initial   Assessment Unable to respond  (Just very limited words)   Intervention Nurtured hope;Sustaining presence/ [facility]   Outcome Did not respond  (Only able to whisper few short words)

## 2021-06-11 NOTE — ONCOLOGY
ONCOLOGY HEMATOLOGY CARE PROGRESS NOTE      SUBJECTIVE:     Afebrile and on room air. States he has back pain, but appears comfortable. ROS:   The remaining 10 point review of symptoms is unremarkable. OBJECTIVE        Physical    VITALS:  /71   Pulse 72   Temp 98 °F (36.7 °C) (Oral)   Resp 16   Ht 5' 8\" (1.727 m)   Wt 180 lb (81.6 kg)   SpO2 97%   BMI 27.37 kg/m²   TEMPERATURE:  Current - Temp: 98 °F (36.7 °C); Max - Temp  Av.1 °F (36.7 °C)  Min: 97.8 °F (36.6 °C)  Max: 98.4 °F (36.9 °C)  PULSE OXIMETRY RANGE: SpO2  Av.4 %  Min: 97 %  Max: 98 %  24HR INTAKE/OUTPUT:      Intake/Output Summary (Last 24 hours) at 2021 2516  Last data filed at 2021 0445  Gross per 24 hour   Intake 744 ml   Output 2750 ml   Net -2006 ml       CONSTITUTIONAL:  awake, alert, cooperative, no apparent distress, HEENT oral pharynx , no scleral icterus  HEMATOLOGIC/LYMPHATICS:  no cervical lymphadenopathy, no supraclavicular lymphadenopathy, no axillary lymphadenopathy and no inguinal lymphadenopathy  LUNGS:  No increased work of breathing, good air exchange, clear to auscultation bilaterally, no crackles or wheezing  CARDIOVASCULAR:  , regular rate and rhythm, normal S1 and S2, no S3 or S4, and no murmur noted  ABDOMEN:  No scars, normal bowel sounds, soft, non-distended, non-tender, no masses palpated, no hepatosplenomegally  MUSCULOSKELETAL:  There is no redness, warmth, or swelling of the joints. EXTREMETIES: No clubbing cynosis or edema  NEUROLOGIC:  Awake, alert, oriented to name, place and time. Cranial nerves II-XII are grossly intact. Motor is 5 out of 5 bilaterally.    SKIN:  no bruising or bleeding      Data      Recent Labs     213   WBC 11.7*   HGB 8.7*   HCT 24.4*      MCV 99.4        Recent Labs     21      K 5.0      CO2 26   BUN 9   CREATININE 0.7*     Recent Labs     21   AST 56*   ALT 33 BILITOT 4.3*   ALKPHOS 90       Magnesium:    Lab Results   Component Value Date    MG 1.80 05/05/2021    MG 1.80 05/04/2021    MG 1.90 01/12/2021         Problem List  Patient Active Problem List   Diagnosis    Sickle cell pain crisis ([facility] [address]    Asthma    Sickle cell crisis ([facility] [address]    Sepsis ([facility] [address]    Opiate overdose ([facility] [address]    Opiate antagonist poisoning, accidental or unintentional, initial encounter    Leukocytosis    Hypoxia    Anemia    Other chest pain    Pneumonia due to infectious organism    Fever    Chronic prescription opiate use       ASSESSMENT AND PLAN    Sickle cell pain crisis  Cont Hydrea   Hgb at baseline  Cont IV hydration   Dilaudid increased to 1 mg every 3 hours PRN   Oxycodone 10 mg PRN   Back pain- neuro exam intact. Hold on MRI imaging for now- XRAY of T and L spine was unremarkable for acute fracture. Does not want Lidoderm patch.        ONCOLOGIC DISPOSITION:  Okay for discharge. I am concerned about repeated admissions and the potential for addiction.       Ronda Brito MD  Please contact through [facility]

## 2021-06-11 NOTE — PROGRESS NOTES
Hospitalist Progress Note      PCP: Gillian Thompson MD    Date of Admission: 6/9/2021    Chief Complaint: Back pain      Hospital Course: H&P reviewed. Patient admitted with sickle cell crisis    Subjective:     Patient reports back pain persistent. Denies any fever or chills. Medications:  Reviewed    Infusion Medications    sodium chloride      sodium chloride 75 mL/hr at 06/11/21 1038     Scheduled Medications    ketorolac  30 mg Intravenous Q8H    acetaminophen  650 mg Oral Once    diphenhydrAMINE  25 mg Oral Once    folic acid  1 mg Oral Daily    famotidine  20 mg Oral BID    hydroxyurea  1,000 mg Oral BID    enoxaparin  40 mg Subcutaneous Daily     PRN Meds: HYDROmorphone, sodium chloride, lidocaine, sodium chloride flush, oxyCODONE, potassium chloride, magnesium sulfate, promethazine **OR** ondansetron, acetaminophen **OR** acetaminophen      Intake/Output Summary (Last 24 hours) at 6/11/2021 1319  Last data filed at 6/11/2021 0445  Gross per 24 hour   Intake 720 ml   Output 2125 ml   Net -1405 ml       Physical Exam Performed:    /61   Pulse 76   Temp 98.2 °F (36.8 °C) (Oral)   Resp 16   Ht 5' 8\" (1.727 m)   Wt 180 lb (81.6 kg)   SpO2 95%   BMI 27.37 kg/m²     General appearance: In mild distress due to pain,  apparent distress, appears stated age and cooperative. HEENT:  Normal cephalic, atraumatic without obvious deformity. Pupils equal, round,  Conjunctivae/corneas clear. Neck: Supple, with full range of motion. No jugular venous distention. Trachea midline. Respiratory:  Normal respiratory effort. Clear to auscultation, bilaterally without Rales/Wheezes/Rhonchi. Cardiovascular:  Regular rate and rhythm with normal S1/S2 without murmurs, rubs or gallops. Abdomen: Soft, non-tender, non-distended with normal bowel sounds. Musculoskeletal:  No clubbing, cyanosis or edema bilaterally.    Skin:  Warm and dry   Neurologic:  Neurovascularly intact without any focal sensory/motor deficits. Cranial nerves: II-XII intact, grossly non-focal.  Psychiatric:  Alert and oriented, thought content appropriate, normal insight  Back : Paraspinal tenderness in thoracolumbar region       Labs:   Recent Labs     06/09/21 2143 06/11/21  0829   WBC 11.7* 11.2*   HGB 8.7* 7.5*   HCT 24.4* 21.6*    285     Recent Labs     06/09/21 2050 06/11/21  0829    141   K 5.0 3.9    108   CO2 26 23   BUN 9 8   CREATININE 0.7* 0.6*   CALCIUM 9.9 8.9     Recent Labs     06/09/21 2050   AST 56*   ALT 33   BILITOT 4.3*   ALKPHOS 90     No results for input(s): INR in the last 72 hours. No results for input(s): Charley Loth in the last 72 hours. Urinalysis:      Lab Results   Component Value Date    NITRU Negative 06/09/2021    WBCUA None seen 06/09/2021    BACTERIA Rare 06/09/2021    RBCUA 0-2 06/09/2021    BLOODU TRACE-INTACT 06/09/2021    SPECGRAV 1.010 06/09/2021    GLUCOSEU Negative 06/09/2021       Radiology:  XR CHEST PORTABLE   Final Result   Stable appearing chest without acute cardiopulmonary process. XR THORACIC SPINE (3 VIEWS)   Final Result   No acute osseous abnormality identified. XR LUMBAR SPINE (2-3 VIEWS)   Final Result   Normal radiographic examination of the lumbar spine. Assessment/Plan:    Active Hospital Problems    Diagnosis     Sickle cell crisis ([facility] [address] [D57.00]        Back pain: likely due to sickle crisis. Xray was non acute. Pain control per hemonc. Continue pain control per meds ordered.         Sickle cell crisis :  Appreciate hemonc help. I PRBC today for hgb 7.5. Continue  supportive care with IVF pain meds, hydrourea, folic acid     Leucocytosis : chronic on chart review. Afebrile. No overt signs of infection- UA neg forUTI and CXR non acute. Monitor. DVT Prophylaxis: Lovenox       diet: ADULT DIET;  Regular  Code Status: Full Code    PT/OT Eval Status: Not yet ordered    Dispo - when okay with heme-onc and patient's pain fairly controlled for discharge outpatient    Devorah Orellana MD

## 2021-06-12 LAB
BASOPHILS ABSOLUTE: 0.1 K/UL (ref 0–0.2)
BASOPHILS RELATIVE PERCENT: 1.5 %
EOSINOPHILS ABSOLUTE: 0.2 K/UL (ref 0–0.6)
EOSINOPHILS RELATIVE PERCENT: 1.5 %
HCT VFR BLD CALC: 24 % (ref 40.5–52.5)
HEMOGLOBIN: 8.4 G/DL (ref 13.5–17.5)
LYMPHOCYTES ABSOLUTE: 2.4 K/UL (ref 1–5.1)
LYMPHOCYTES RELATIVE PERCENT: 23.4 %
MCH RBC QN AUTO: 33.5 PG (ref 26–34)
MCHC RBC AUTO-ENTMCNC: 35.2 G/DL (ref 31–36)
MCV RBC AUTO: 95.2 FL (ref 80–100)
MONOCYTES ABSOLUTE: 1.2 K/UL (ref 0–1.3)
MONOCYTES RELATIVE PERCENT: 12 %
NEUTROPHILS ABSOLUTE: 6.2 K/UL (ref 1.7–7.7)
NEUTROPHILS RELATIVE PERCENT: 61.6 %
PDW BLD-RTO: 21.6 % (ref 12.4–15.4)
PLATELET # BLD: 318 K/UL (ref 135–450)
PMV BLD AUTO: 8.3 FL (ref 5–10.5)
RBC # BLD: 2.52 M/UL (ref 4.2–5.9)
WBC # BLD: 10.1 K/UL (ref 4–11)

## 2021-06-12 PROCEDURE — 36415 COLL VENOUS BLD VENIPUNCTURE: CPT

## 2021-06-12 PROCEDURE — 6360000002 HC RX W HCPCS: Performed by: NURSE PRACTITIONER

## 2021-06-12 PROCEDURE — 6370000000 HC RX 637 (ALT 250 FOR IP): Performed by: INTERNAL MEDICINE

## 2021-06-12 PROCEDURE — 1200000000 HC SEMI PRIVATE

## 2021-06-12 PROCEDURE — 85025 COMPLETE CBC W/AUTO DIFF WBC: CPT

## 2021-06-12 PROCEDURE — 6360000002 HC RX W HCPCS: Performed by: INTERNAL MEDICINE

## 2021-06-12 PROCEDURE — 2580000003 HC RX 258: Performed by: NURSE PRACTITIONER

## 2021-06-12 PROCEDURE — 2580000003 HC RX 258: Performed by: INTERNAL MEDICINE

## 2021-06-12 RX ADMIN — FAMOTIDINE 20 MG: 20 TABLET ORAL at 07:55

## 2021-06-12 RX ADMIN — FAMOTIDINE 20 MG: 20 TABLET ORAL at 20:20

## 2021-06-12 RX ADMIN — HYDROXYUREA 1000 MG: 500 CAPSULE ORAL at 20:19

## 2021-06-12 RX ADMIN — HYDROMORPHONE HYDROCHLORIDE 1 MG: 2 INJECTION, SOLUTION INTRAMUSCULAR; INTRAVENOUS; SUBCUTANEOUS at 14:09

## 2021-06-12 RX ADMIN — OXYCODONE HYDROCHLORIDE 5 MG: 5 TABLET ORAL at 00:49

## 2021-06-12 RX ADMIN — HYDROMORPHONE HYDROCHLORIDE 1 MG: 2 INJECTION, SOLUTION INTRAMUSCULAR; INTRAVENOUS; SUBCUTANEOUS at 07:54

## 2021-06-12 RX ADMIN — HYDROMORPHONE HYDROCHLORIDE 1 MG: 2 INJECTION, SOLUTION INTRAMUSCULAR; INTRAVENOUS; SUBCUTANEOUS at 01:40

## 2021-06-12 RX ADMIN — HYDROMORPHONE HYDROCHLORIDE 1 MG: 2 INJECTION, SOLUTION INTRAMUSCULAR; INTRAVENOUS; SUBCUTANEOUS at 16:05

## 2021-06-12 RX ADMIN — HYDROMORPHONE HYDROCHLORIDE 1 MG: 2 INJECTION, SOLUTION INTRAMUSCULAR; INTRAVENOUS; SUBCUTANEOUS at 22:29

## 2021-06-12 RX ADMIN — HYDROMORPHONE HYDROCHLORIDE 1 MG: 2 INJECTION, SOLUTION INTRAMUSCULAR; INTRAVENOUS; SUBCUTANEOUS at 20:20

## 2021-06-12 RX ADMIN — HYDROMORPHONE HYDROCHLORIDE 1 MG: 2 INJECTION, SOLUTION INTRAMUSCULAR; INTRAVENOUS; SUBCUTANEOUS at 05:44

## 2021-06-12 RX ADMIN — FOLIC ACID 1 MG: 1 TABLET ORAL at 07:55

## 2021-06-12 RX ADMIN — HYDROMORPHONE HYDROCHLORIDE 1 MG: 2 INJECTION, SOLUTION INTRAMUSCULAR; INTRAVENOUS; SUBCUTANEOUS at 03:45

## 2021-06-12 RX ADMIN — HYDROMORPHONE HYDROCHLORIDE 1 MG: 2 INJECTION, SOLUTION INTRAMUSCULAR; INTRAVENOUS; SUBCUTANEOUS at 18:13

## 2021-06-12 RX ADMIN — Medication 3 ML: at 00:50

## 2021-06-12 RX ADMIN — HYDROMORPHONE HYDROCHLORIDE 1 MG: 2 INJECTION, SOLUTION INTRAMUSCULAR; INTRAVENOUS; SUBCUTANEOUS at 12:09

## 2021-06-12 RX ADMIN — SODIUM CHLORIDE: 9 INJECTION, SOLUTION INTRAVENOUS at 05:44

## 2021-06-12 RX ADMIN — KETOROLAC TROMETHAMINE 30 MG: 30 INJECTION, SOLUTION INTRAMUSCULAR at 00:49

## 2021-06-12 RX ADMIN — KETOROLAC TROMETHAMINE 30 MG: 30 INJECTION, SOLUTION INTRAMUSCULAR at 18:26

## 2021-06-12 RX ADMIN — HYDROMORPHONE HYDROCHLORIDE 1 MG: 2 INJECTION, SOLUTION INTRAMUSCULAR; INTRAVENOUS; SUBCUTANEOUS at 09:49

## 2021-06-12 RX ADMIN — HYDROXYUREA 1000 MG: 500 CAPSULE ORAL at 09:59

## 2021-06-12 RX ADMIN — KETOROLAC TROMETHAMINE 30 MG: 30 INJECTION, SOLUTION INTRAMUSCULAR at 10:00

## 2021-06-12 ASSESSMENT — PAIN SCALES - GENERAL
PAINLEVEL_OUTOF10: 8
PAINLEVEL_OUTOF10: 6
PAINLEVEL_OUTOF10: 8
PAINLEVEL_OUTOF10: 8
PAINLEVEL_OUTOF10: 6
PAINLEVEL_OUTOF10: 8
PAINLEVEL_OUTOF10: 7
PAINLEVEL_OUTOF10: 8
PAINLEVEL_OUTOF10: 9
PAINLEVEL_OUTOF10: 8

## 2021-06-12 ASSESSMENT — PAIN DESCRIPTION - PROGRESSION
CLINICAL_PROGRESSION: NOT CHANGED
CLINICAL_PROGRESSION: NOT CHANGED

## 2021-06-12 NOTE — PROGRESS NOTES
Pt requested to go to vending machine for a ham sandwich. Pt is not on tele. Independent. Pt ambulated w/ IV pole to 1st floor vending machine and back w/o difficulty.

## 2021-06-12 NOTE — ONCOLOGY
ONCOLOGY HEMATOLOGY CARE PROGRESS NOTE      SUBJECTIVE:     Afebrile and on room air. ROS:   The remaining 10 point review of symptoms is unremarkable. OBJECTIVE        Physical    VITALS:  /62   Pulse 70   Temp 97.6 °F (36.4 °C) (Oral)   Resp 18   Ht 5' 8\" (1.727 m)   Wt 180 lb (81.6 kg)   SpO2 96%   BMI 27.37 kg/m²   TEMPERATURE:  Current - Temp: 97.6 °F (36.4 °C); Max - Temp  Av.3 °F (36.8 °C)  Min: 97.6 °F (36.4 °C)  Max: 98.9 °F (37.2 °C)  PULSE OXIMETRY RANGE: SpO2  Av.1 %  Min: 95 %  Max: 97 %  24HR INTAKE/OUTPUT:      Intake/Output Summary (Last 24 hours) at 2021 0916  Last data filed at 2021 8251  Gross per 24 hour   Intake 1523 ml   Output 1000 ml   Net 523 ml       CONSTITUTIONAL:  awake, alert, cooperative, no apparent distress, HEENT oral pharynx , no scleral icterus  HEMATOLOGIC/LYMPHATICS:  no cervical lymphadenopathy, no supraclavicular lymphadenopathy, no axillary lymphadenopathy and no inguinal lymphadenopathy  LUNGS:  No increased work of breathing, good air exchange, clear to auscultation bilaterally, no crackles or wheezing  CARDIOVASCULAR:  , regular rate and rhythm, normal S1 and S2, no S3 or S4, and no murmur noted  ABDOMEN:  No scars, normal bowel sounds, soft, non-distended, non-tender, no masses palpated, no hepatosplenomegally  MUSCULOSKELETAL:  There is no redness, warmth, or swelling of the joints. EXTREMETIES: No clubbing cynosis or edema  NEUROLOGIC:  Awake, alert, oriented to name, place and time. Cranial nerves II-XII are grossly intact. Motor is 5 out of 5 bilaterally.    SKIN:  no bruising or bleeding      Data      Recent Labs     21  2143 21  0829 21  0759   WBC 11.7* 11.2* 10.1   HGB 8.7* 7.5* 8.4*   HCT 24.4* 21.6* 24.0*    285 318   MCV 99.4 99.6 95.2        Recent Labs     21  0829    141   K 5.0 3.9    108   CO2 26 23   BUN 9 8   CREATININE 0.7* 0.6*     Recent Labs     06/09/21 2050   AST 56*   ALT 33   BILITOT 4.3*   ALKPHOS 90       Magnesium:    Lab Results   Component Value Date    MG 1.80 05/05/2021    MG 1.80 05/04/2021    MG 1.90 01/12/2021         Problem List  Patient Active Problem List   Diagnosis    S[facility] cell pain crisis ([facility] [address]    Asthma    Sickle cell crisis ([facility] [address]    Sepsis ([facility] [address]    Opiate overdose ([facility] [address]    Opiate antagonist poisoning, accidental or unintentional, initial encounter    Leukocytosis    Hypoxia    Anemia    Other chest pain    Pneumonia due to infectious organism    Fever    Chronic prescription opiate use       ASSESSMENT AND PLAN    Sickle cell pain crisis  Cont Hydrea   Hgb at baseline  Cont IV hydration   Dilaudid increased to 1 mg every 3 hours PRN   Toradol added 6/11  Transfused 1 unit of PRBCs on 6/11  Oxycodone 5 mg PRN   Back pain- neuro exam intact. Hold on MRI imaging for now- XRAY of T and L spine was unremarkable for acute fracture. Does not want Lidoderm patch.        ONCOLOGIC DISPOSITION:  Okay for discharge. I am concerned about repeated admissions and the potential for addiction. Oxycodone 10 mg tab #60 was eprescribed on 6/07 at [facility].      Thanh Pyle MD  Please contact through [facility]

## 2021-06-12 NOTE — PROGRESS NOTES
Hospitalist Progress Note      PCP: Luz Pierce MD    Date of Admission: 6/9/2021    Chief Complaint: Back pain      Hospital Course: H&P reviewed. Patient admitted with sickle cell crisis    Subjective:     Patient feels about the same back pain fairly unchanged. Medications:  Reviewed    Infusion Medications    sodium chloride      sodium chloride 75 mL/hr at 06/12/21 0544     Scheduled Medications    ketorolac  30 mg Intravenous I8K    folic acid  1 mg Oral Daily    famotidine  20 mg Oral BID    hydroxyurea  1,000 mg Oral BID    enoxaparin  40 mg Subcutaneous Daily     PRN Meds: HYDROmorphone, sodium chloride, lidocaine, sodium chloride flush, oxyCODONE, potassium chloride, magnesium sulfate, promethazine **OR** ondansetron, acetaminophen **OR** acetaminophen      Intake/Output Summary (Last 24 hours) at 6/12/2021 1245  Last data filed at 6/12/2021 1981  Gross per 24 hour   Intake 1523 ml   Output 1000 ml   Net 523 ml       Physical Exam Performed:    /68   Pulse 68   Temp 98.6 °F (37 °C) (Oral)   Resp 18   Ht 5' 8\" (1.727 m)   Wt 180 lb (81.6 kg)   SpO2 97%   BMI 27.37 kg/m²     General appearance: In mild distress due to pain,  apparent distress, appears stated age and cooperative. HEENT:  Normal cephalic, atraumatic without obvious deformity. Pupils equal, round,  Conjunctivae/corneas clear. Neck: Supple, with full range of motion. No jugular venous distention. Trachea midline. Respiratory:  Normal respiratory effort. Clear to auscultation, bilaterally without Rales/Wheezes/Rhonchi. Cardiovascular:  Regular rate and rhythm with normal S1/S2 without murmurs, rubs or gallops. Abdomen: Soft, non-tender, non-distended with normal bowel sounds. Musculoskeletal:  No clubbing, cyanosis or edema bilaterally. Skin:  Warm and dry   Neurologic:  Neurovascularly intact without any focal sensory/motor deficits.  Cranial nerves: II-XII intact, grossly non-focal.  Psychiatric:  Alert and oriented, thought content appropriate, normal insight  Back : Paraspinal tenderness in thoracolumbar region       Labs:   Recent Labs     06/09/21  2143 06/11/21  0829 06/12/21  0759   WBC 11.7* 11.2* 10.1   HGB 8.7* 7.5* 8.4*   HCT 24.4* 21.6* 24.0*    285 318     Recent Labs     06/09/21 2050 06/11/21  0829    141   K 5.0 3.9    108   CO2 26 23   BUN 9 8   CREATININE 0.7* 0.6*   CALCIUM 9.9 8.9     Recent Labs     06/09/21 2050   AST 56*   ALT 33   BILITOT 4.3*   ALKPHOS 90     No results for input(s): INR in the last 72 hours. No results for input(s): Rejeana Kemps in the last 72 hours. Urinalysis:      Lab Results   Component Value Date    NITRU Negative 06/09/2021    WBCUA None seen 06/09/2021    BACTERIA Rare 06/09/2021    RBCUA 0-2 06/09/2021    BLOODU TRACE-INTACT 06/09/2021    SPECGRAV 1.010 06/09/2021    GLUCOSEU Negative 06/09/2021       Radiology:  XR CHEST PORTABLE   Final Result   Stable appearing chest without acute cardiopulmonary process. XR THORACIC SPINE (3 VIEWS)   Final Result   No acute osseous abnormality identified. XR LUMBAR SPINE (2-3 VIEWS)   Final Result   Normal radiographic examination of the lumbar spine. Assessment/Plan:    Active Hospital Problems    Diagnosis     Sickle cell crisis ([facility] [address] [D57.00]        Back pain: likely due to sickle crisis. Xray was non acute. Pain control per hemonc. Continue pain control per meds ordered.         Sickle cell crisis :  S/p 1 PRBC on 6/11 with good response. Continue  supportive care with IVF pain meds, hydrourea, folic acid     Leucocytosis : chronic on chart review. Afebrile. No overt signs of infection- UA neg for UTI and CXR non acute. Improved. DVT Prophylaxis: Lovenox       diet: ADULT DIET;  Regular  Code Status: Full Code    PT/OT Eval Status: Not yet ordered    Dispo -okay to discharge home today per heme-onc however  patient disagrees with at this time as still feels that his pain is still not controlled for discharge home today still needing IV pain meds for pain control.       Jose J Kwan MD

## 2021-06-12 NOTE — PROGRESS NOTES
Patient alert and oriented x 4. VSS. Patient has been in pain  most of the day needing Dilaudid every 2 hours. Patients pain is mostly in his back. Patient ambulates well. Patient has had no complaints of nausea. Call light in reach. Will continue to monitor.  Electronically signed by Azul Olvera RN on 6/12/2021 at 3:55 PM

## 2021-06-13 LAB
ANION GAP SERPL CALCULATED.3IONS-SCNC: 7 MMOL/L (ref 3–16)
BASOPHILS ABSOLUTE: 0.1 K/UL (ref 0–0.2)
BASOPHILS RELATIVE PERCENT: 0.7 %
BLOOD BANK DISPENSE STATUS: NORMAL
BLOOD BANK DISPENSE STATUS: NORMAL
BLOOD BANK PRODUCT CODE: NORMAL
BLOOD BANK PRODUCT CODE: NORMAL
BPU ID: NORMAL
BPU ID: NORMAL
BUN BLDV-MCNC: 6 MG/DL (ref 7–20)
CALCIUM SERPL-MCNC: 9.3 MG/DL (ref 8.3–10.6)
CHLORIDE BLD-SCNC: 107 MMOL/L (ref 99–110)
CO2: 26 MMOL/L (ref 21–32)
CREAT SERPL-MCNC: 0.6 MG/DL (ref 0.9–1.3)
DESCRIPTION BLOOD BANK: NORMAL
DESCRIPTION BLOOD BANK: NORMAL
EOSINOPHILS ABSOLUTE: 0.1 K/UL (ref 0–0.6)
EOSINOPHILS RELATIVE PERCENT: 1.1 %
GFR AFRICAN AMERICAN: >60
GFR NON-AFRICAN AMERICAN: >60
GLUCOSE BLD-MCNC: 83 MG/DL (ref 70–99)
HCT VFR BLD CALC: 24.8 % (ref 40.5–52.5)
HEMOGLOBIN: 8.5 G/DL (ref 13.5–17.5)
LYMPHOCYTES ABSOLUTE: 2.9 K/UL (ref 1–5.1)
LYMPHOCYTES RELATIVE PERCENT: 22.2 %
MCH RBC QN AUTO: 32.9 PG (ref 26–34)
MCHC RBC AUTO-ENTMCNC: 34.4 G/DL (ref 31–36)
MCV RBC AUTO: 95.7 FL (ref 80–100)
MONOCYTES ABSOLUTE: 1.3 K/UL (ref 0–1.3)
MONOCYTES RELATIVE PERCENT: 9.9 %
NEUTROPHILS ABSOLUTE: 8.7 K/UL (ref 1.7–7.7)
NEUTROPHILS RELATIVE PERCENT: 66.1 %
PDW BLD-RTO: 21.8 % (ref 12.4–15.4)
PLATELET # BLD: 356 K/UL (ref 135–450)
PMV BLD AUTO: 8.9 FL (ref 5–10.5)
POTASSIUM SERPL-SCNC: 3.8 MMOL/L (ref 3.5–5.1)
RBC # BLD: 2.59 M/UL (ref 4.2–5.9)
SODIUM BLD-SCNC: 140 MMOL/L (ref 136–145)
WBC # BLD: 13.2 K/UL (ref 4–11)

## 2021-06-13 PROCEDURE — 2580000003 HC RX 258: Performed by: NURSE PRACTITIONER

## 2021-06-13 PROCEDURE — 85025 COMPLETE CBC W/AUTO DIFF WBC: CPT

## 2021-06-13 PROCEDURE — 1200000000 HC SEMI PRIVATE

## 2021-06-13 PROCEDURE — 6370000000 HC RX 637 (ALT 250 FOR IP): Performed by: INTERNAL MEDICINE

## 2021-06-13 PROCEDURE — 36415 COLL VENOUS BLD VENIPUNCTURE: CPT

## 2021-06-13 PROCEDURE — 6360000002 HC RX W HCPCS: Performed by: NURSE PRACTITIONER

## 2021-06-13 PROCEDURE — 80048 BASIC METABOLIC PNL TOTAL CA: CPT

## 2021-06-13 PROCEDURE — 6360000002 HC RX W HCPCS: Performed by: INTERNAL MEDICINE

## 2021-06-13 RX ORDER — OXYCODONE HYDROCHLORIDE 5 MG/1
10 TABLET ORAL EVERY 4 HOURS PRN
Status: DISCONTINUED | OUTPATIENT
Start: 2021-06-13 | End: 2021-06-14 | Stop reason: HOSPADM

## 2021-06-13 RX ORDER — OXYCODONE HYDROCHLORIDE 5 MG/1
10 TABLET ORAL ONCE
Status: COMPLETED | OUTPATIENT
Start: 2021-06-13 | End: 2021-06-13

## 2021-06-13 RX ADMIN — FAMOTIDINE 20 MG: 20 TABLET ORAL at 20:10

## 2021-06-13 RX ADMIN — HYDROMORPHONE HYDROCHLORIDE 1 MG: 2 INJECTION, SOLUTION INTRAMUSCULAR; INTRAVENOUS; SUBCUTANEOUS at 13:42

## 2021-06-13 RX ADMIN — HYDROMORPHONE HYDROCHLORIDE 1 MG: 2 INJECTION, SOLUTION INTRAMUSCULAR; INTRAVENOUS; SUBCUTANEOUS at 06:08

## 2021-06-13 RX ADMIN — HYDROMORPHONE HYDROCHLORIDE 1 MG: 2 INJECTION, SOLUTION INTRAMUSCULAR; INTRAVENOUS; SUBCUTANEOUS at 08:27

## 2021-06-13 RX ADMIN — HYDROMORPHONE HYDROCHLORIDE 1 MG: 2 INJECTION, SOLUTION INTRAMUSCULAR; INTRAVENOUS; SUBCUTANEOUS at 21:23

## 2021-06-13 RX ADMIN — FOLIC ACID 1 MG: 1 TABLET ORAL at 08:27

## 2021-06-13 RX ADMIN — KETOROLAC TROMETHAMINE 30 MG: 30 INJECTION, SOLUTION INTRAMUSCULAR at 18:03

## 2021-06-13 RX ADMIN — OXYCODONE HYDROCHLORIDE 10 MG: 5 TABLET ORAL at 12:27

## 2021-06-13 RX ADMIN — FAMOTIDINE 20 MG: 20 TABLET ORAL at 08:27

## 2021-06-13 RX ADMIN — HYDROMORPHONE HYDROCHLORIDE 1 MG: 2 INJECTION, SOLUTION INTRAMUSCULAR; INTRAVENOUS; SUBCUTANEOUS at 15:52

## 2021-06-13 RX ADMIN — SODIUM CHLORIDE: 9 INJECTION, SOLUTION INTRAVENOUS at 12:27

## 2021-06-13 RX ADMIN — KETOROLAC TROMETHAMINE 30 MG: 30 INJECTION, SOLUTION INTRAMUSCULAR at 13:04

## 2021-06-13 RX ADMIN — HYDROXYUREA 1000 MG: 500 CAPSULE ORAL at 20:10

## 2021-06-13 RX ADMIN — OXYCODONE HYDROCHLORIDE 10 MG: 5 TABLET ORAL at 20:09

## 2021-06-13 RX ADMIN — HYDROMORPHONE HYDROCHLORIDE 1 MG: 2 INJECTION, SOLUTION INTRAMUSCULAR; INTRAVENOUS; SUBCUTANEOUS at 18:03

## 2021-06-13 RX ADMIN — OXYCODONE HYDROCHLORIDE 10 MG: 5 TABLET ORAL at 07:19

## 2021-06-13 RX ADMIN — HYDROMORPHONE HYDROCHLORIDE 1 MG: 2 INJECTION, SOLUTION INTRAMUSCULAR; INTRAVENOUS; SUBCUTANEOUS at 01:40

## 2021-06-13 RX ADMIN — HYDROMORPHONE HYDROCHLORIDE 1 MG: 2 INJECTION, SOLUTION INTRAMUSCULAR; INTRAVENOUS; SUBCUTANEOUS at 03:42

## 2021-06-13 RX ADMIN — HYDROXYUREA 1000 MG: 500 CAPSULE ORAL at 08:30

## 2021-06-13 ASSESSMENT — PAIN SCALES - GENERAL
PAINLEVEL_OUTOF10: 10
PAINLEVEL_OUTOF10: 8
PAINLEVEL_OUTOF10: 10
PAINLEVEL_OUTOF10: 9
PAINLEVEL_OUTOF10: 8
PAINLEVEL_OUTOF10: 8
PAINLEVEL_OUTOF10: 10
PAINLEVEL_OUTOF10: 0
PAINLEVEL_OUTOF10: 9
PAINLEVEL_OUTOF10: 8
PAINLEVEL_OUTOF10: 10
PAINLEVEL_OUTOF10: 9
PAINLEVEL_OUTOF10: 8
PAINLEVEL_OUTOF10: 9

## 2021-06-13 ASSESSMENT — PAIN DESCRIPTION - PROGRESSION
CLINICAL_PROGRESSION: NOT CHANGED

## 2021-06-13 NOTE — PROGRESS NOTES
Patient given Dilaudid this morning through his primary line infusing with NS at 75/hr, patient then asked if I could flush the Dilaudid in and was told no and that it would infuse with the IVF.

## 2021-06-13 NOTE — ONCOLOGY
ONCOLOGY HEMATOLOGY CARE PROGRESS NOTE      SUBJECTIVE:     Afebrile and on room air. Still reports back pain, but does not appear uncomfortable. ROS:   The remaining 10 point review of symptoms is unremarkable. OBJECTIVE        Physical    VITALS:  /71   Pulse 61   Temp 99 °F (37.2 °C)   Resp 15   Ht 5' 8\" (1.727 m)   Wt 180 lb (81.6 kg)   SpO2 97%   BMI 27.37 kg/m²   TEMPERATURE:  Current - Temp: 99 °F (37.2 °C); Max - Temp  Av.4 °F (36.9 °C)  Min: 97.6 °F (36.4 °C)  Max: 99 °F (37.2 °C)  PULSE OXIMETRY RANGE: SpO2  Av.3 %  Min: 95 %  Max: 97 %  24HR INTAKE/OUTPUT:      Intake/Output Summary (Last 24 hours) at 2021 0753  Last data filed at 2021 0252  Gross per 24 hour   Intake 320 ml   Output 1175 ml   Net -855 ml       CONSTITUTIONAL:  awake, alert, cooperative, no apparent distress, HEENT oral pharynx , no scleral icterus  HEMATOLOGIC/LYMPHATICS:  no cervical lymphadenopathy, no supraclavicular lymphadenopathy, no axillary lymphadenopathy and no inguinal lymphadenopathy  LUNGS:  No increased work of breathing, good air exchange, clear to auscultation bilaterally, no crackles or wheezing  CARDIOVASCULAR:  , regular rate and rhythm, normal S1 and S2, no S3 or S4, and no murmur noted  ABDOMEN:  No scars, normal bowel sounds, soft, non-distended, non-tender, no masses palpated, no hepatosplenomegally  MUSCULOSKELETAL:  There is no redness, warmth, or swelling of the joints. EXTREMETIES: No clubbing cynosis or edema  NEUROLOGIC:  Awake, alert, oriented to name, place and time. Cranial nerves II-XII are grossly intact. Motor is 5 out of 5 bilaterally.    SKIN:  no bruising or bleeding      Data      Recent Labs     21  0829 21  0759   WBC 11.2* 10.1   HGB 7.5* 8.4*   HCT 21.6* 24.0*    318   MCV 99.6 95.2        Recent Labs     21  0829      K 3.9      CO2 23   BUN 8   CREATININE 0.6*     No results for input(s): AST, ALT, ALB, BILIDIR, BILITOT, ALKPHOS in the last 72 hours. Magnesium:    Lab Results   Component Value Date    MG 1.80 05/05/2021    MG 1.80 05/04/2021    MG 1.90 01/12/2021         Problem List  Patient Active Problem List   Diagnosis    [facility] crisis [facility] [address]    Asthma    [facility]ick[facility] cell crisis ([facility] [address]    Sepsis ([facility] [address]    Opiate overdose ([facility] [address]    Opiate antagonist poisoning, accidental or unintentional, initial encounter    Leukocytosis    Hypoxia    Anemia    Other chest pain    Pneumonia due to infectious organism    Fever    Chronic prescription opiate use       ASSESSMENT AND PLAN    Sickle cell pain crisis  Cont Hydrea   Hgb at baseline  Cont IV hydration   Dilaudid increased to 1 mg every 3 hours PRN   Toradol added 6/11  Transfused 1 unit of PRBCs on 6/11  Oxycodone 5 mg PRN  For mild pain. Oxycodone 10 mg for severe. Back pain- neuro exam intact. Hold on MRI imaging for now- XRAY of T and L spine was unremarkable for acute fracture. Does not want Lidoderm patch.        ONCOLOGIC DISPOSITION:  Okay for discharge. Likely tomorrow (6/14/2021). I am concerned about repeated admissions and the potential for addiction. Oxycodone 10 mg tab #60 was eprescribed on 6/07 at [place]. I worry that he has consumed all of his pain meds ahead of schedule leading to admission once again. I will confirm what dose of Oxycodone he is to be on as an outpatient with Dr. Bernadette Munoz if he is not rounding at [facility] tomorrow.     Mikayla Amezcua MD  Please contact through [facility]

## 2021-06-13 NOTE — PROGRESS NOTES
Pt continues to decline PO pain medication this shift and request dilaudid. Received order for 1x dose of 10mg oxycodone per pts home dose. Offered home dose of oxycodone, but pt states his pain is to bad and is requesting dilaudid. Pt given dilaudid at time to help decrease pain, but discussed with patient that PO medications will be given prior to IV per [facility]. Patient voiced understanding.  Talita Bacon RN

## 2021-06-13 NOTE — PROGRESS NOTES
Hospitalist Progress Note      PCP: Henok Vail MD    Date of Admission: 6/9/2021    Chief Complaint: Back pain      Hospital Course: H&P reviewed. Patient admitted with sickle cell crisis    Subjective:     Patient reports back pain fairly unchanged. Still getting frequent IV pain medication. Family at bedside       Medications:  Reviewed    Infusion Medications    sodium chloride      sodium chloride 75 mL/hr at 06/12/21 0544     Scheduled Medications    ketorolac  30 mg Intravenous K8I    folic acid  1 mg Oral Daily    famotidine  20 mg Oral BID    hydroxyurea  1,000 mg Oral BID    enoxaparin  40 mg Subcutaneous Daily     PRN Meds: oxyCODONE, HYDROmorphone, sodium chloride, lidocaine, sodium chloride flush, oxyCODONE, potassium chloride, magnesium sulfate, promethazine **OR** ondansetron, acetaminophen **OR** acetaminophen      Intake/Output Summary (Last 24 hours) at 6/13/2021 1226  Last data filed at 6/13/2021 0842  Gross per 24 hour   Intake 320 ml   Output 1875 ml   Net -1555 ml       Physical Exam Performed:    /74   Pulse 70   Temp 97.9 °F (36.6 °C) (Oral)   Resp 12   Ht 5' 8\" (1.727 m)   Wt 180 lb (81.6 kg)   SpO2 97%   BMI 27.37 kg/m²     General appearance:  Not in acute distress, appears stated age and cooperative. HEENT:  Normal cephalic, atraumatic without obvious deformity. Pupils equal, round,  Conjunctivae/corneas clear. Neck: Supple, with full range of motion. No jugular venous distention. Trachea midline. Respiratory:  Normal respiratory effort. Clear to auscultation, bilaterally without Rales/Wheezes/Rhonchi. Cardiovascular:  Regular rate and rhythm with normal S1/S2 without murmurs, rubs or gallops. Abdomen: Soft, non-tender, non-distended with normal bowel sounds. Musculoskeletal:  No clubbing, cyanosis or edema bilaterally.    Skin:  Warm and dry   Neurologic:   Alert, speech clear with no overt facial droop  Psychiatric:  Alert and oriented, thought content appropriate, normal insight  Back : Paraspinal tenderness in thoracolumbar region       Labs:   Recent Labs     06/11/21  0829 06/12/21  0759 06/13/21  0734   WBC 11.2* 10.1 13.2*   HGB 7.5* 8.4* 8.5*   HCT 21.6* 24.0* 24.8*    318 356     Recent Labs     06/11/21  0829 06/13/21  0734    140   K 3.9 3.8    107   CO2 23 26   BUN 8 6*   CREATININE 0.6* 0.6*   CALCIUM 8.9 9.3     No results for input(s): AST, ALT, BILIDIR, BILITOT, ALKPHOS in the last 72 hours. No results for input(s): INR in the last 72 hours. No results for input(s): Izabella Lemme in the last 72 hours. Urinalysis:      Lab Results   Component Value Date    NITRU Negative 06/09/2021    WBCUA None seen 06/09/2021    BACTERIA Rare 06/09/2021    RBCUA 0-2 06/09/2021    BLOODU TRACE-INTACT 06/09/2021    SPECGRAV 1.010 06/09/2021    GLUCOSEU Negative 06/09/2021       Radiology:  XR CHEST PORTABLE   Final Result   Stable appearing chest without acute cardiopulmonary process. XR THORACIC SPINE (3 VIEWS)   Final Result   No acute osseous abnormality identified. XR LUMBAR SPINE (2-3 VIEWS)   Final Result   Normal radiographic examination of the lumbar spine. Assessment/Plan:    Active Hospital Problems    Diagnosis     Sickle cell crisis ([facility] [address] [D57.00]        Back pain: likely due to sickle crisis. Xray was non acute. Pain control per hemonc- PO  oxycodone dose was increased. Patient encouraged to take p.o. oxycodone and use IV dilaudid for breakthrough pain only. Continue pain control per meds ordered.         Sickle cell crisis :  S/p 1 PRBC on 6/11 with good response. Hgb stable at baseline. Continue  supportive care with IVF, pain meds, hydrourea, folic acid     Leucocytosis : chronic on chart review. Afebrile. No overt signs of infection- UA neg for UTI and CXR non acute. Monitor      DVT Prophylaxis: Lovenox       diet: ADULT DIET;  Regular  Code Status: Full Code    PT/OT Eval Status: Not yet ordered    Dispo -when pain fairly controlled with oral medications and okay with heme-onc          Audubon Pump, MD

## 2021-06-14 VITALS
RESPIRATION RATE: 16 BRPM | DIASTOLIC BLOOD PRESSURE: 72 MMHG | SYSTOLIC BLOOD PRESSURE: 131 MMHG | HEART RATE: 61 BPM | WEIGHT: 180 LBS | BODY MASS INDEX: 27.28 KG/M2 | HEIGHT: 68 IN | OXYGEN SATURATION: 97 % | TEMPERATURE: 97.6 F

## 2021-06-14 PROCEDURE — 6360000002 HC RX W HCPCS: Performed by: NURSE PRACTITIONER

## 2021-06-14 PROCEDURE — 6370000000 HC RX 637 (ALT 250 FOR IP): Performed by: NURSE PRACTITIONER

## 2021-06-14 PROCEDURE — 6360000002 HC RX W HCPCS: Performed by: INTERNAL MEDICINE

## 2021-06-14 PROCEDURE — 6370000000 HC RX 637 (ALT 250 FOR IP): Performed by: INTERNAL MEDICINE

## 2021-06-14 RX ORDER — HYDROMORPHONE HCL 110MG/55ML
1 PATIENT CONTROLLED ANALGESIA SYRINGE INTRAVENOUS EVERY 4 HOURS PRN
Status: DISCONTINUED | OUTPATIENT
Start: 2021-06-14 | End: 2021-06-14

## 2021-06-14 RX ORDER — OXYCODONE HYDROCHLORIDE 5 MG/1
10 TABLET ORAL ONCE
Status: COMPLETED | OUTPATIENT
Start: 2021-06-14 | End: 2021-06-14

## 2021-06-14 RX ADMIN — HYDROMORPHONE HYDROCHLORIDE 1 MG: 2 INJECTION, SOLUTION INTRAMUSCULAR; INTRAVENOUS; SUBCUTANEOUS at 04:12

## 2021-06-14 RX ADMIN — OXYCODONE HYDROCHLORIDE 10 MG: 5 TABLET ORAL at 03:09

## 2021-06-14 RX ADMIN — OXYCODONE HYDROCHLORIDE 10 MG: 5 TABLET ORAL at 01:41

## 2021-06-14 RX ADMIN — OXYCODONE HYDROCHLORIDE 20 MG: 15 TABLET ORAL at 07:39

## 2021-06-14 RX ADMIN — HYDROMORPHONE HYDROCHLORIDE 1 MG: 2 INJECTION, SOLUTION INTRAMUSCULAR; INTRAVENOUS; SUBCUTANEOUS at 00:20

## 2021-06-14 RX ADMIN — KETOROLAC TROMETHAMINE 30 MG: 30 INJECTION, SOLUTION INTRAMUSCULAR at 01:41

## 2021-06-14 ASSESSMENT — PAIN DESCRIPTION - PROGRESSION
CLINICAL_PROGRESSION: NOT CHANGED

## 2021-06-14 ASSESSMENT — PAIN SCALES - GENERAL
PAINLEVEL_OUTOF10: 8
PAINLEVEL_OUTOF10: 8
PAINLEVEL_OUTOF10: 7
PAINLEVEL_OUTOF10: 8
PAINLEVEL_OUTOF10: 7
PAINLEVEL_OUTOF10: 7
PAINLEVEL_OUTOF10: 10
PAINLEVEL_OUTOF10: 9

## 2021-06-14 NOTE — CARE COORDINATION
CASE MANAGEMENT DISCHARGE SUMMARY      Discharge to: home        Transportation:    Family/car:     Confirmed discharge plan with:     Patient: yes      RN, name: Karen Gatica    Note: Discharging nurse to complete DANIS, reconcile AVS, and place final copy with patient's discharge packet. RN to ensure that written prescriptions for  Level II medications are sent with patient to the facility as per protocol.

## 2021-06-14 NOTE — DISCHARGE SUMMARY
Hospital Medicine Discharge Summary    Patient ID: [facility]      Patient's PCP: Caroline Eid MD    Admit Date: 6/9/2021     Discharge Date:   06/14/21     Admitting Physician: Umm Ann MD     Discharge Physician: Oc Calderón MD     Discharge Diagnoses: Active Hospital Problems    Diagnosis     Sickle cell crisis ([facility] [address] [D57.00]        The patient was seen and examined on day of discharge and this discharge summary is in conjunction with any daily progress note from day of discharge. Hospital Course:  \"19 y.o. male with hx of sickle cell anemia who presented to [facility] with back pain for past 2 days which gradually worsened. He denied any recent trauma, tingling or numbness. Reports his back pain is similar to his sickle cell crisis in past. Took his chronic narcotic meds with no relief. Seen in ER and was admitted overnight for further care. \"      Sickle cell pain crisis. Given 1u pRBCs on 6/11. Eventually pain controlled here. Discharged with a new oxycodone 10 mg prescription through the [facility]. Continue hydroxyurea. The patient is very polite and generally cooperative, but there is some concern that he is misusing the opioids. The patient frequent declined PO meds and went straight to IV hydromorphone. He would ask the nurses to flush it in quickly. Noted input from Dr. Luci Middleton: \"I am concerned about repeated admissions and the potential for addiction. Oxycodone 10 mg tab #60 was eprescribed on 6/07 at [facility]. I worry that he has consumed all of his pain meds ahead of schedule leading to admission once again. \"  This is going to be a complicated issue since he certainly does have real pain, will continue to look to oncology for guidance and continuity.               Physical Exam Performed:     /72   Pulse 61   Temp 97.6 °F (36.4 °C) (Oral)   Resp 16   Ht 5' 8\" (1.727 m)   Wt 180 lb (81.6 kg)   SpO2 97%   BMI 27.37 kg/m² General appearance:  No apparent distress, appears stated age and cooperative. HEENT:  Normal cephalic, atraumatic without obvious deformity. Pupils equal, round, and reactive to light. Extra ocular muscles intact. Conjunctivae/corneas clear. Neck: Supple, with full range of motion. No jugular venous distention. Trachea midline. Respiratory:  Normal respiratory effort. Clear to auscultation, bilaterally without Rales/Wheezes/Rhonchi. Cardiovascular:  Regular rate and rhythm with normal S1/S2 without murmurs, rubs or gallops. Abdomen: Soft, non-tender, non-distended with normal bowel sounds. Musculoskeletal:  No clubbing, cyanosis or edema bilaterally. Full range of motion without deformity. Skin: Skin color, texture, turgor normal.  No rashes or lesions. Neurologic:  Neurovascularly intact without any focal sensory/motor deficits. Cranial nerves: II-XII intact, grossly non-focal.  Psychiatric:  Alert and oriented, thought content appropriate, normal insight  Capillary Refill: Brisk,< 3 seconds   Peripheral Pulses: +2 palpable, equal bilaterally       Labs: For convenience and continuity at follow-up the following most recent labs are provided:      CBC:    Lab Results   Component Value Date    WBC 13.2 06/13/2021    HGB 8.5 06/13/2021    HCT 24.8 06/13/2021     06/13/2021       Renal:    Lab Results   Component Value Date     06/13/2021    K 3.8 06/13/2021    K 5.0 06/09/2021     06/13/2021    CO2 26 06/13/2021    BUN 6 06/13/2021    CREATININE 0.6 06/13/2021    CALCIUM 9.3 06/13/2021         Significant Diagnostic Studies    Radiology:   XR CHEST PORTABLE   Final Result   Stable appearing chest without acute cardiopulmonary process. XR THORACIC SPINE (3 VIEWS)   Final Result   No acute osseous abnormality identified. XR LUMBAR SPINE (2-3 VIEWS)   Final Result   Normal radiographic examination of the lumbar spine.                 Consults:     IP CONSULT TO HOSPITALIST  IP CONSULT TO HEM/ONC    Disposition:  home     Condition at Discharge: Stable    Discharge Instructions/Follow-up:  Follow up with oncology within 1-2 weeks. Code Status:  Full Code     Activity: activity as tolerated    Diet: encourage fluids      Discharge Medications:     Current Discharge Medication List           Details   oxyCODONE (OXYCONTIN) 20 MG extended release tablet TAKE 1 TABLET BY MOUTH EVERY 12 HOURS. TAKE WITH ENOUGH WATER TO SWALLOW WHOLE. DO NOT CRUSH/DISSOLVE/CHEW/CUT/BREAK. oxyCODONE 5 MG capsule Take 10 mg by mouth every 4 hours as needed for Pain.      hydroxyurea (HYDREA) 500 MG chemo capsule Take 2 capsules by mouth 2 times daily  Qty:                 Time Spent on discharge is more than 30 minutes in the examination, evaluation, counseling and review of medications and discharge plan. Signed:    Nate Mas MD   6/14/2021      Thank you Samantha Pillai MD for the opportunity to be involved in this patient's care. If you have any questions or concerns please feel free to contact me at [phone].

## 2021-06-14 NOTE — PROGRESS NOTES
Pt called out requesting pain medication. Pt not due for dilaudid until 0420. Message sent to [facility] regarding pain medication. New orders noted.  Keren Green RN

## 2021-06-14 NOTE — ONCOLOGY
ONCOLOGY HEMATOLOGY CARE PROGRESS NOTE      SUBJECTIVE:     Afebrile and on room air. ROS:   The remaining 10 point review of symptoms is unremarkable. OBJECTIVE        Physical    VITALS:  /74   Pulse 53   Temp 98 °F (36.7 °C) (Oral)   Resp 15   Ht 5' 8\" (1.727 m)   Wt 180 lb (81.6 kg)   SpO2 96%   BMI 27.37 kg/m²   TEMPERATURE:  Current - Temp: 98 °F (36.7 °C); Max - Temp  Av.2 °F (36.8 °C)  Min: 97.9 °F (36.6 °C)  Max: 98.6 °F (37 °C)  PULSE OXIMETRY RANGE: SpO2  Av.5 %  Min: 96 %  Max: 97 %  24HR INTAKE/OUTPUT:      Intake/Output Summary (Last 24 hours) at 2021 0705  Last data filed at 2021 0320  Gross per 24 hour   Intake 360 ml   Output 1850 ml   Net -1490 ml       CONSTITUTIONAL:  awake, alert, cooperative, no apparent distress, HEENT oral pharynx , no scleral icterus  HEMATOLOGIC/LYMPHATICS:  no cervical lymphadenopathy, no supraclavicular lymphadenopathy, no axillary lymphadenopathy and no inguinal lymphadenopathy  LUNGS:  No increased work of breathing, good air exchange, clear to auscultation bilaterally, no crackles or wheezing  CARDIOVASCULAR:  , regular rate and rhythm, normal S1 and S2, no S3 or S4, and no murmur noted  ABDOMEN:  No scars, normal bowel sounds, soft, non-distended, non-tender, no masses palpated, no hepatosplenomegally  MUSCULOSKELETAL:  There is no redness, warmth, or swelling of the joints. EXTREMETIES: No clubbing cynosis or edema  NEUROLOGIC:  Awake, alert, oriented to name, place and time. Cranial nerves II-XII are grossly intact. Motor is 5 out of 5 bilaterally.    SKIN:  no bruising or bleeding      Data      Recent Labs     21  0829 21  0759 21  0734   WBC 11.2* 10.1 13.2*   HGB 7.5* 8.4* 8.5*   HCT 21.6* 24.0* 24.8*    318 356   MCV 99.6 95.2 95.7        Recent Labs     21  0829 21  0734    140   K 3.9 3.8    107   CO2 23 26   BUN 8 6*   CREATININE 0.6* 0.6*     No results for input(s): AST, ALT, ALB, BILIDIR, BILITOT, ALKPHOS in the last 72 hours. Magnesium:    Lab Results   Component Value Date    MG 1.80 05/05/2021    MG 1.80 05/04/2021    MG 1.90 01/12/2021         Problem List  Patient Active Problem List   Diagnosis    S[facility]le [facility] crisis [facility] [address]    Asthma    Sickle cell crisis ([facility] [address]    Sepsis ([facility] [address]    Opiate overdose ([facility] [address]    Opiate antagonist poisoning, accidental or unintentional, initial encounter    Leukocytosis    Hypoxia    Anemia    Other chest pain    Pneumonia due to infectious organism    Fever    Chronic prescription opiate use       ASSESSMENT AND PLAN    Sickle cell pain crisis  Cont Hydrea   Hgb at baseline  Cont IV hydration   Toradol added 6/11  Transfused 1 unit of PRBCs on 6/11  Oxycodone 5 mg PRN  For mild pain. Oxycodone 10 mg for severe. Back pain- neuro exam intact. Hold on MRI imaging for now- XRAY of T and L spine was unremarkable for acute fracture. Does not want Lidoderm patch.        ONCOLOGIC DISPOSITION:  Okay for discharge today. I am concerned about repeated admissions and the potential for addiction. Oxycodone 10 mg tab #60 was eprescribed on 6/07 at [facility]. I worry that he has consumed all of his pain meds ahead of schedule leading to admission once again. I did discuss with Dr. Keith Ledbetter yesterday. Oxycodone 10 mg tabs are recommended at this time. Either Dr. Keith Ledbetter or myself will prescribe this through the [facility] EMR so that we can internally keep track of his prescriptions. Will have [facility] staff reach out to [facility] for follow up to discuss his pain meds, dose, frequency of Oxycodone.     Krupa Zimmerman MD  Please contact through [facility]

## 2021-06-14 NOTE — PROGRESS NOTES
Pt called out for pain medications. Pt is due for oxycodone at this time. Pt refusing to take PO medications and states that his dilaudid is due. Reports 10/10 pain at this time. Educated patient on the purpose of taking oral pain medication first, but continues to refuse PO medications and only wants his dilaudid at this time. Provider notified. New orders noted.  Natty Frank RN

## 2021-06-20 ENCOUNTER — HOSPITAL ENCOUNTER (INPATIENT)
Age: 21
LOS: 3 days | Discharge: HOME | DRG: 812 | End: 2021-06-23
Attending: EMERGENCY MEDICINE | Admitting: INTERNAL MEDICINE
Payer: COMMERCIAL

## 2021-06-20 DIAGNOSIS — D57.00 SICKLE CELL PAIN CRISIS (HCC): Primary | ICD-10-CM

## 2021-06-20 LAB
A/G RATIO: 1.6 (ref 1.1–2.2)
ALBUMIN SERPL-MCNC: 4.5 G/DL (ref 3.4–5)
ALP BLD-CCNC: 81 U/L (ref 40–129)
ALT SERPL-CCNC: 19 U/L (ref 10–40)
ANION GAP SERPL CALCULATED.3IONS-SCNC: 11 MMOL/L (ref 3–16)
AST SERPL-CCNC: 26 U/L (ref 15–37)
BASOPHILS ABSOLUTE: 0.1 K/UL (ref 0–0.2)
BASOPHILS RELATIVE PERCENT: 0.6 %
BILIRUB SERPL-MCNC: 3.2 MG/DL (ref 0–1)
BUN BLDV-MCNC: 6 MG/DL (ref 7–20)
CALCIUM SERPL-MCNC: 9.1 MG/DL (ref 8.3–10.6)
CHLORIDE BLD-SCNC: 107 MMOL/L (ref 99–110)
CO2: 23 MMOL/L (ref 21–32)
CREAT SERPL-MCNC: 0.9 MG/DL (ref 0.9–1.3)
EOSINOPHILS ABSOLUTE: 0.1 K/UL (ref 0–0.6)
EOSINOPHILS RELATIVE PERCENT: 0.9 %
GFR AFRICAN AMERICAN: >60
GFR NON-AFRICAN AMERICAN: >60
GLOBULIN: 2.9 G/DL
GLUCOSE BLD-MCNC: 93 MG/DL (ref 70–99)
HCT VFR BLD CALC: 24 % (ref 40.5–52.5)
HEMATOLOGY PATH CONSULT: NO
HEMOGLOBIN: 8.4 G/DL (ref 13.5–17.5)
IMMATURE RETIC FRACT: 0.6 (ref 0.21–0.37)
LACTATE DEHYDROGENASE: 530 U/L (ref 100–190)
LYMPHOCYTES ABSOLUTE: 3.1 K/UL (ref 1–5.1)
LYMPHOCYTES RELATIVE PERCENT: 19.2 %
MCH RBC QN AUTO: 35.4 PG (ref 26–34)
MCHC RBC AUTO-ENTMCNC: 34.8 G/DL (ref 31–36)
MCV RBC AUTO: 101.6 FL (ref 80–100)
MONOCYTES ABSOLUTE: 1.8 K/UL (ref 0–1.3)
MONOCYTES RELATIVE PERCENT: 11.4 %
NEUTROPHILS ABSOLUTE: 10.8 K/UL (ref 1.7–7.7)
NEUTROPHILS RELATIVE PERCENT: 67.9 %
PDW BLD-RTO: 23.2 % (ref 12.4–15.4)
PLATELET # BLD: 433 K/UL (ref 135–450)
PLATELET SLIDE REVIEW: ADEQUATE
PMV BLD AUTO: 8.1 FL (ref 5–10.5)
POTASSIUM REFLEX MAGNESIUM: 3.7 MMOL/L (ref 3.5–5.1)
RBC # BLD: 2.37 M/UL (ref 4.2–5.9)
RETICULOCYTE ABSOLUTE COUNT: 0.13 M/UL
RETICULOCYTE COUNT PCT: 5.66 % (ref 0.5–2.18)
SLIDE REVIEW: ABNORMAL
SODIUM BLD-SCNC: 141 MMOL/L (ref 136–145)
TOTAL PROTEIN: 7.4 G/DL (ref 6.4–8.2)
WBC # BLD: 15.9 K/UL (ref 4–11)

## 2021-06-20 PROCEDURE — 96375 TX/PRO/DX INJ NEW DRUG ADDON: CPT

## 2021-06-20 PROCEDURE — 1200000000 HC SEMI PRIVATE

## 2021-06-20 PROCEDURE — 99283 EMERGENCY DEPT VISIT LOW MDM: CPT

## 2021-06-20 PROCEDURE — 6360000002 HC RX W HCPCS: Performed by: INTERNAL MEDICINE

## 2021-06-20 PROCEDURE — 6370000000 HC RX 637 (ALT 250 FOR IP): Performed by: INTERNAL MEDICINE

## 2021-06-20 PROCEDURE — 83615 LACTATE (LD) (LDH) ENZYME: CPT

## 2021-06-20 PROCEDURE — 80053 COMPREHEN METABOLIC PANEL: CPT

## 2021-06-20 PROCEDURE — 6360000002 HC RX W HCPCS: Performed by: NURSE PRACTITIONER

## 2021-06-20 PROCEDURE — 6360000002 HC RX W HCPCS: Performed by: EMERGENCY MEDICINE

## 2021-06-20 PROCEDURE — 2580000003 HC RX 258: Performed by: EMERGENCY MEDICINE

## 2021-06-20 PROCEDURE — 96374 THER/PROPH/DIAG INJ IV PUSH: CPT

## 2021-06-20 PROCEDURE — 94640 AIRWAY INHALATION TREATMENT: CPT

## 2021-06-20 PROCEDURE — 2580000003 HC RX 258: Performed by: INTERNAL MEDICINE

## 2021-06-20 PROCEDURE — 85045 AUTOMATED RETICULOCYTE COUNT: CPT

## 2021-06-20 PROCEDURE — 94761 N-INVAS EAR/PLS OXIMETRY MLT: CPT

## 2021-06-20 PROCEDURE — 85025 COMPLETE CBC W/AUTO DIFF WBC: CPT

## 2021-06-20 PROCEDURE — 96376 TX/PRO/DX INJ SAME DRUG ADON: CPT

## 2021-06-20 RX ORDER — ONDANSETRON 4 MG/1
4 TABLET, ORALLY DISINTEGRATING ORAL EVERY 8 HOURS PRN
Status: DISCONTINUED | OUTPATIENT
Start: 2021-06-20 | End: 2021-06-23 | Stop reason: HOSPADM

## 2021-06-20 RX ORDER — HYDROXYUREA 500 MG/1
1000 CAPSULE ORAL 2 TIMES DAILY
Status: DISCONTINUED | OUTPATIENT
Start: 2021-06-20 | End: 2021-06-23 | Stop reason: HOSPADM

## 2021-06-20 RX ORDER — HYDROMORPHONE HCL 110MG/55ML
1 PATIENT CONTROLLED ANALGESIA SYRINGE INTRAVENOUS
Status: DISCONTINUED | OUTPATIENT
Start: 2021-06-20 | End: 2021-06-20

## 2021-06-20 RX ORDER — POLYETHYLENE GLYCOL 3350 17 G/17G
17 POWDER, FOR SOLUTION ORAL DAILY PRN
Status: DISCONTINUED | OUTPATIENT
Start: 2021-06-20 | End: 2021-06-23 | Stop reason: HOSPADM

## 2021-06-20 RX ORDER — LEVALBUTEROL INHALATION SOLUTION 0.31 MG/3ML
SOLUTION RESPIRATORY (INHALATION)
COMMUNITY

## 2021-06-20 RX ORDER — ACETAMINOPHEN 325 MG/1
650 TABLET ORAL EVERY 6 HOURS PRN
Status: DISCONTINUED | OUTPATIENT
Start: 2021-06-20 | End: 2021-06-23 | Stop reason: HOSPADM

## 2021-06-20 RX ORDER — SODIUM CHLORIDE 0.9 % (FLUSH) 0.9 %
5-40 SYRINGE (ML) INJECTION EVERY 12 HOURS SCHEDULED
Status: DISCONTINUED | OUTPATIENT
Start: 2021-06-20 | End: 2021-06-20 | Stop reason: SDUPTHER

## 2021-06-20 RX ORDER — BUDESONIDE AND FORMOTEROL FUMARATE DIHYDRATE 160; 4.5 UG/1; UG/1
2 AEROSOL RESPIRATORY (INHALATION) 2 TIMES DAILY
Status: DISCONTINUED | OUTPATIENT
Start: 2021-06-20 | End: 2021-06-20

## 2021-06-20 RX ORDER — ONDANSETRON 2 MG/ML
4 INJECTION INTRAMUSCULAR; INTRAVENOUS EVERY 6 HOURS PRN
Status: DISCONTINUED | OUTPATIENT
Start: 2021-06-20 | End: 2021-06-23 | Stop reason: HOSPADM

## 2021-06-20 RX ORDER — SODIUM CHLORIDE 0.9 % (FLUSH) 0.9 %
5-40 SYRINGE (ML) INJECTION EVERY 12 HOURS SCHEDULED
Status: DISCONTINUED | OUTPATIENT
Start: 2021-06-20 | End: 2021-06-23 | Stop reason: HOSPADM

## 2021-06-20 RX ORDER — HYDROMORPHONE HCL 110MG/55ML
0.5 PATIENT CONTROLLED ANALGESIA SYRINGE INTRAVENOUS ONCE
Status: COMPLETED | OUTPATIENT
Start: 2021-06-20 | End: 2021-06-20

## 2021-06-20 RX ORDER — 0.9 % SODIUM CHLORIDE 0.9 %
1000 INTRAVENOUS SOLUTION INTRAVENOUS ONCE
Status: COMPLETED | OUTPATIENT
Start: 2021-06-20 | End: 2021-06-20

## 2021-06-20 RX ORDER — BUDESONIDE AND FORMOTEROL FUMARATE DIHYDRATE 160; 4.5 UG/1; UG/1
2 AEROSOL RESPIRATORY (INHALATION) PRN
Status: DISCONTINUED | OUTPATIENT
Start: 2021-06-20 | End: 2021-06-23 | Stop reason: HOSPADM

## 2021-06-20 RX ORDER — IBUPROFEN 800 MG/1
TABLET ORAL
COMMUNITY

## 2021-06-20 RX ORDER — SODIUM CHLORIDE 0.9 % (FLUSH) 0.9 %
5-40 SYRINGE (ML) INJECTION PRN
Status: DISCONTINUED | OUTPATIENT
Start: 2021-06-20 | End: 2021-06-23 | Stop reason: HOSPADM

## 2021-06-20 RX ORDER — DEXTROSE AND SODIUM CHLORIDE 5; .45 G/100ML; G/100ML
INJECTION, SOLUTION INTRAVENOUS CONTINUOUS
Status: DISCONTINUED | OUTPATIENT
Start: 2021-06-20 | End: 2021-06-23 | Stop reason: HOSPADM

## 2021-06-20 RX ORDER — ACETAMINOPHEN 650 MG/1
650 SUPPOSITORY RECTAL EVERY 6 HOURS PRN
Status: DISCONTINUED | OUTPATIENT
Start: 2021-06-20 | End: 2021-06-23 | Stop reason: HOSPADM

## 2021-06-20 RX ORDER — LIDOCAINE HYDROCHLORIDE 10 MG/ML
5 INJECTION, SOLUTION INFILTRATION; PERINEURAL ONCE
Status: DISCONTINUED | OUTPATIENT
Start: 2021-06-20 | End: 2021-06-23 | Stop reason: HOSPADM

## 2021-06-20 RX ORDER — HYDROMORPHONE HCL 110MG/55ML
1 PATIENT CONTROLLED ANALGESIA SYRINGE INTRAVENOUS ONCE
Status: COMPLETED | OUTPATIENT
Start: 2021-06-20 | End: 2021-06-20

## 2021-06-20 RX ORDER — NALOXONE HYDROCHLORIDE 0.4 MG/ML
0.4 INJECTION, SOLUTION INTRAMUSCULAR; INTRAVENOUS; SUBCUTANEOUS PRN
Status: DISCONTINUED | OUTPATIENT
Start: 2021-06-20 | End: 2021-06-23 | Stop reason: HOSPADM

## 2021-06-20 RX ORDER — SODIUM CHLORIDE 9 MG/ML
25 INJECTION, SOLUTION INTRAVENOUS PRN
Status: DISCONTINUED | OUTPATIENT
Start: 2021-06-20 | End: 2021-06-20 | Stop reason: SDUPTHER

## 2021-06-20 RX ORDER — DOCUSATE SODIUM 100 MG/1
100 CAPSULE, LIQUID FILLED ORAL DAILY
Status: DISCONTINUED | OUTPATIENT
Start: 2021-06-20 | End: 2021-06-23 | Stop reason: HOSPADM

## 2021-06-20 RX ORDER — KETOROLAC TROMETHAMINE 30 MG/ML
30 INJECTION, SOLUTION INTRAMUSCULAR; INTRAVENOUS 3 TIMES DAILY
Status: DISCONTINUED | OUTPATIENT
Start: 2021-06-20 | End: 2021-06-23 | Stop reason: HOSPADM

## 2021-06-20 RX ORDER — HYDROMORPHONE HCL 110MG/55ML
0.5 PATIENT CONTROLLED ANALGESIA SYRINGE INTRAVENOUS
Status: DISCONTINUED | OUTPATIENT
Start: 2021-06-20 | End: 2021-06-20

## 2021-06-20 RX ORDER — ALBUTEROL SULFATE 90 UG/1
AEROSOL, METERED RESPIRATORY (INHALATION)
COMMUNITY

## 2021-06-20 RX ORDER — KETOROLAC TROMETHAMINE 30 MG/ML
30 INJECTION, SOLUTION INTRAMUSCULAR; INTRAVENOUS ONCE
Status: COMPLETED | OUTPATIENT
Start: 2021-06-20 | End: 2021-06-20

## 2021-06-20 RX ORDER — PSEUDOEPHEDRINE HCL 30 MG
100 TABLET ORAL
COMMUNITY

## 2021-06-20 RX ORDER — LACTULOSE 10 G/15ML
15 SOLUTION ORAL PRN
Status: ON HOLD | COMMUNITY
End: 2021-09-02 | Stop reason: HOSPADM

## 2021-06-20 RX ORDER — OXYCODONE HCL 20 MG/1
20 TABLET, FILM COATED, EXTENDED RELEASE ORAL EVERY 12 HOURS SCHEDULED
Status: DISCONTINUED | OUTPATIENT
Start: 2021-06-20 | End: 2021-06-20

## 2021-06-20 RX ORDER — SODIUM CHLORIDE 9 MG/ML
25 INJECTION, SOLUTION INTRAVENOUS PRN
Status: DISCONTINUED | OUTPATIENT
Start: 2021-06-20 | End: 2021-06-23 | Stop reason: HOSPADM

## 2021-06-20 RX ORDER — SODIUM CHLORIDE 0.9 % (FLUSH) 0.9 %
5-40 SYRINGE (ML) INJECTION PRN
Status: DISCONTINUED | OUTPATIENT
Start: 2021-06-20 | End: 2021-06-20 | Stop reason: SDUPTHER

## 2021-06-20 RX ORDER — LEVALBUTEROL 1.25 MG/.5ML
0.31 SOLUTION, CONCENTRATE RESPIRATORY (INHALATION) EVERY 4 HOURS PRN
Status: DISCONTINUED | OUTPATIENT
Start: 2021-06-20 | End: 2021-06-23 | Stop reason: HOSPADM

## 2021-06-20 RX ADMIN — HYDROXYUREA 1000 MG: 500 CAPSULE ORAL at 22:08

## 2021-06-20 RX ADMIN — HYDROMORPHONE HYDROCHLORIDE 1 MG: 1 INJECTION, SOLUTION INTRAMUSCULAR; INTRAVENOUS; SUBCUTANEOUS at 05:05

## 2021-06-20 RX ADMIN — HYDROXYUREA 1000 MG: 500 CAPSULE ORAL at 09:09

## 2021-06-20 RX ADMIN — KETOROLAC TROMETHAMINE 30 MG: 30 INJECTION, SOLUTION INTRAMUSCULAR; INTRAVENOUS at 20:06

## 2021-06-20 RX ADMIN — Medication 10 ML: at 09:09

## 2021-06-20 RX ADMIN — SODIUM CHLORIDE 1000 ML: 9 INJECTION, SOLUTION INTRAVENOUS at 04:19

## 2021-06-20 RX ADMIN — Medication: at 20:04

## 2021-06-20 RX ADMIN — KETOROLAC TROMETHAMINE 30 MG: 30 INJECTION, SOLUTION INTRAMUSCULAR at 04:03

## 2021-06-20 RX ADMIN — HYDROMORPHONE HYDROCHLORIDE 0.5 MG: 2 INJECTION, SOLUTION INTRAMUSCULAR; INTRAVENOUS; SUBCUTANEOUS at 22:21

## 2021-06-20 RX ADMIN — HYDROMORPHONE HYDROCHLORIDE 1 MG: 2 INJECTION, SOLUTION INTRAMUSCULAR; INTRAVENOUS; SUBCUTANEOUS at 09:07

## 2021-06-20 RX ADMIN — HYDROMORPHONE HYDROCHLORIDE 1 MG: 2 INJECTION, SOLUTION INTRAMUSCULAR; INTRAVENOUS; SUBCUTANEOUS at 06:45

## 2021-06-20 RX ADMIN — DEXTROSE AND SODIUM CHLORIDE: 5; 450 INJECTION, SOLUTION INTRAVENOUS at 14:17

## 2021-06-20 RX ADMIN — DOCUSATE SODIUM 100 MG: 100 CAPSULE, LIQUID FILLED ORAL at 09:03

## 2021-06-20 RX ADMIN — HYDROMORPHONE HYDROCHLORIDE 1 MG: 2 INJECTION, SOLUTION INTRAMUSCULAR; INTRAVENOUS; SUBCUTANEOUS at 18:00

## 2021-06-20 RX ADMIN — DEXTROSE AND SODIUM CHLORIDE: 5; 450 INJECTION, SOLUTION INTRAVENOUS at 06:45

## 2021-06-20 RX ADMIN — KETOROLAC TROMETHAMINE 30 MG: 30 INJECTION, SOLUTION INTRAMUSCULAR; INTRAVENOUS at 13:00

## 2021-06-20 RX ADMIN — Medication 2 PUFF: at 08:48

## 2021-06-20 RX ADMIN — DEXTROSE AND SODIUM CHLORIDE: 5; 450 INJECTION, SOLUTION INTRAVENOUS at 22:24

## 2021-06-20 RX ADMIN — KETOROLAC TROMETHAMINE 30 MG: 30 INJECTION, SOLUTION INTRAMUSCULAR; INTRAVENOUS at 09:03

## 2021-06-20 RX ADMIN — Medication: at 11:11

## 2021-06-20 RX ADMIN — HYDROMORPHONE HYDROCHLORIDE 1 MG: 1 INJECTION, SOLUTION INTRAMUSCULAR; INTRAVENOUS; SUBCUTANEOUS at 04:03

## 2021-06-20 ASSESSMENT — PAIN SCALES - GENERAL
PAINLEVEL_OUTOF10: 10

## 2021-06-20 ASSESSMENT — PAIN DESCRIPTION - LOCATION
LOCATION: GENERALIZED
LOCATION: KNEE

## 2021-06-20 ASSESSMENT — PAIN DESCRIPTION - PAIN TYPE
TYPE: CHRONIC PAIN;ACUTE PAIN
TYPE: ACUTE PAIN

## 2021-06-20 ASSESSMENT — PAIN DESCRIPTION - ORIENTATION: ORIENTATION: RIGHT;LEFT

## 2021-06-20 ASSESSMENT — PAIN DESCRIPTION - ONSET: ONSET: ON-GOING

## 2021-06-20 ASSESSMENT — PAIN DESCRIPTION - DESCRIPTORS: DESCRIPTORS: CONSTANT

## 2021-06-20 NOTE — PROGRESS NOTES
Patient in bed awake. A/O x 4. Assessment completed and charted. Labored respirations due to pain. Pt in too much pain to answer admit questions at this time. PCA pump initiated. New order for PICC line. At this time pt wants to hold off on PICC line placement. Bed in lowest position and locked. Call light in reach.

## 2021-06-20 NOTE — PROGRESS NOTES
Messaged Dr. Hernandez Cover regarding continued 10/10 pain and pt not finding relief. Awaiting for message to be read and response.

## 2021-06-20 NOTE — H&P
Hospital Medicine History & Physical      PCP: Elizabeth Morse MD    Date of Admission: 6/20/2021    Date of Service: Pt seen/examined on 6.20.21 and Admitted to Inpatient with expected LOS greater than two midnights due to medical therapy    Chief Complaint: Bilateral knee pain      History Of Present Illness:   23 y.o. male who presented to [facility] with above complaints  Patient with PMH of sickle cell followed by Dr. Tello Gleason, recently admitted to [facility] on 6/10 and discharged on 6/14 for sickle cell pain crisis presents to the ED today with complaints of pain all over, specially worse in the knees. Pt has been admitted already 4 times since May for sickle cell pain crisis. This will be his fifth. Patient reports 10/10 pain in both his knees and legs, took 20 mg of oxycodone just prior to arrival without much relief in pain. Denies any cough or shortness of breath. No chest pain. Denies subjective fevers or chills. Patient was prescribed 60 tablets of oxycodone 10 mg each when he was discharged here on 6/14. Patient reports that he ran out of it yesterday. He has been taking 20 mg every 4 hours at home. 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  Authorizing Provider   albuterol sulfate HFA (PROAIR HFA) 108 (90 Base) MCG/ACT inhaler Albuterol HFA Inhaler 90 mcg/actuation  inhaled  2 puffs prn     Active   Yes Historical Provider, MD   mometasone-formoterol (DULERA) 100-5 MCG/ACT inhaler Mometasone-Formoterol HFA Inhaler 100 mcg-5 mcg/actuation  inhaled  2 puffs every am     Active   Yes Historical Provider, MD   levalbuterol (XOPENEX) 0.31 MG/3ML nebulization Levalbuterol Nebulized    2 puffs prn     Active   Yes Historical Provider, MD   docusate (COLACE, DULCOLAX) 100 MG CAPS Take 100 mg by mouth   Yes Historical Provider, MD   ibuprofen (ADVIL;MOTRIN) 800 MG tablet ibuprofen 800 MG Oral Tablet  oral   prn    Active   Yes Historical Provider, MD   oxyCODONE (OXYCONTIN) 20 MG extended release tablet TAKE 1 TABLET BY MOUTH EVERY 12 HOURS. TAKE WITH ENOUGH WATER TO SWALLOW WHOLE. DO NOT CRUSH/DISSOLVE/CHEW/CUT/BREAK. 4/12/21  Yes Historical Provider, MD   oxyCODONE 5 MG capsule Take 10 mg by mouth every 4 hours as needed for Pain. Yes Historical Provider, MD   hydroxyurea (HYDREA) 500 MG chemo capsule Take 2 capsules by mouth 2 times daily 12/9/20  Yes Esteban Allen MD   lactulose (CHRONULAC) 10 GM/15ML solution Take 15 mLs by mouth    Historical Provider, MD       Allergies:  Morphine    Social History:      The patient currently lives at home    TOBACCO:   reports that he has never smoked. He has never used smokeless tobacco.  ETOH:   reports previous alcohol use. E-Cigarettes/Vaping Use     Questions Responses    E-Cigarette/Vaping Use Never User    Start Date     Passive Exposure     Quit Date     Counseling Given     Comments             Family History:    Reviewed in detail and negative for DM, CAD, Cancer, CVA. REVIEW OF SYSTEMS COMPLETED:   Pertinent positives as noted in the HPI. All other systems reviewed and negative. PHYSICAL EXAM PERFORMED:    /85   Pulse 67   Temp 98.9 °F (37.2 °C) (Oral)   Resp 15   Ht 5' 8\" (1.727 m)   Wt 185 lb (83.9 kg)   SpO2 96%   BMI 28.13 kg/m²     General appearance:  No apparent distress, appears stated age and cooperative. HEENT:  Normal cephalic, atraumatic without obvious deformity. Pupils equal, round, and reactive to light. Extra ocular muscles intact. Conjunctivae/corneas clear. Neck: Supple, with full range of motion. No jugular venous distention. Trachea midline.   Respiratory: Normal respiratory effort. Clear to auscultation, bilaterally without Rales/Wheezes/Rhonchi. Cardiovascular:  Regular rate and rhythm with normal S1/S2 without murmurs, rubs or gallops. Abdomen: Soft, non-tender, non-distended with normal bowel sounds. Musculoskeletal:  No clubbing, cyanosis or edema bilaterally. Full range of motion without deformity  Tender to palpation in his knees and lower legs. Skin: Skin color, texture, turgor normal.  No rashes or lesions. Neurologic:  Neurovascularly intact without any focal sensory/motor deficits. Cranial nerves: II-XII intact, grossly non-focal.  Psychiatric:  Alert and oriented, thought content appropriate, normal insight  Capillary Refill: Brisk,3 seconds, normal  Peripheral Pulses: +2 palpable, equal bilaterally       Labs:     Recent Labs     06/20/21  0406   WBC 15.9*   HGB 8.4*   HCT 24.0*        Recent Labs     06/20/21  0406      K 3.7      CO2 23   BUN 6*   CREATININE 0.9   CALCIUM 9.1     Recent Labs     06/20/21  0406   AST 26   ALT 19   BILITOT 3.2*   ALKPHOS 81     No results for input(s): INR in the last 72 hours. No results for input(s): Joyice Canute in the last 72 hours. Urinalysis:      Lab Results   Component Value Date    NITRU Negative 06/09/2021    WBCUA None seen 06/09/2021    BACTERIA Rare 06/09/2021    RBCUA 0-2 06/09/2021    BLOODU TRACE-INTACT 06/09/2021    SPECGRAV 1.010 06/09/2021    GLUCOSEU Negative 06/09/2021       ASSESSMENT:PLAN:    Sickle cell pain crisis   -IV fluid hydration with D5 half-normal saline  -Hgb transfusion not indicated  -Dilaudid IV 1mg Q3h PRN  - IV toradol 30 mg tid IV  -Continue Hydrea  -Heme-onc consult  -Concern for overuse of his opioid prescription meds. Sickle cell anemia  Hb 8.4 -at baseline.   No indication for transfusion at this time    Asthma  -Stable  -Continue Dulera twice daily, albuterol as needed      DVT Prophylaxis: lmwh  Diet: No diet orders on file  Code Status: full  PT/OT Eval Status: ambulatory    Dispo - IP stay       Mir Berry MD    Thank you Rafiq Hull MD for the opportunity to be involved in this patient's care. If you have any questions or concerns please feel free to contact me at [phone].

## 2021-06-20 NOTE — CONSULTS
HEMATOLOGY/ONCOLOGY CONSULTATION:     6/20/2021 8:14 AM    REASON FOR CONSULT: Sickle cell disease    PROVIDERS:  Licha Quiroz MD    CHIEF COMPLAINT:     Chief Complaint   Patient presents with    Knee Pain       HISTORY OF PRESENT ILLNESS:     HPI:  23 AAM with sickle cell disease followed by Dr. Ria Brennan. The patient has had frequent ER visits and this is his 5th admission in the last 2 months. He was discharged on 6/14 after last pain crisis and was given oxycodone 10 mg x 60 pills. He missed his last appt for crizanlizumab. He was seen in the office on 6/17 asking for more pain meds. He was supposed to have enough pain medication until Monday and told it would be refilled then. Pt was advised he had 60 days to establish with pain management or [facility] would stop filling his medication. He was low on his medication at home. He came to the ER with complaint of generalized pain, worse in his knees. No chest pain or fever.      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)     Sickle cell anemia (HCC)        PAST SURGICAL HISTORY:        Past Surgical History:   Procedure Laterality Date    SPLENECTOMY         SOCIAL HISTORY:     Social History     Socioeconomic History    Marital status: Single     Spouse name: Not on file    Number of children: 0    Years of education: Not on file    Highest education level: Not on file   Occupational History    Not on file   Tobacco Use    Smoking status: Never Smoker    Smokeless tobacco: Never Used   Vaping Use    Vaping Use: Never used   Substance and Sexual Activity    Alcohol use: Not Currently    Drug use: Never    Sexual activity: Not Currently   Other Topics Concern    Not on file   Social History Narrative    Not on file     Social Determinants of Health     Financial Resource Strain:     Difficulty of Paying Living Expenses: Take 10 mg by mouth every 4 hours as needed for Pain.  hydroxyurea (HYDREA) 500 MG chemo capsule Take 2 capsules by mouth 2 times daily      lactulose (CHRONULAC) 10 GM/15ML solution Take 15 mLs by mouth       REVIEW OF SYSTEMS:        10 point ROS completed. Pertinent positives in HPI, otherwise negative. PHYSICAL EXAM:       Vitals:    06/20/21 0622   BP: 130/70   Pulse: 79   Resp: 20   Temp: 98.1 °F (36.7 °C)   SpO2: 100%       General appearance: alert and cooperative  Head: Normocephalic, without obvious abnormality, atraumatic  Neck: No palpable lymphadenopathy in supraclavicular or cervical chains  Lungs: Clear to auscultation bilaterally, no audible rales, wheezes or crackles  Heart: Regular rate and rhythm, S1, S2 normal  Abdomen: Soft, non-tender; bowel sounds normal; no masses,  no organomegaly  Extremities: without cyanosis, clubbing, edema or asymmetry  Skin: No jaundice, purpura or petechiae      LABS:     Lab Results   Component Value Date    WBC 15.9 (H) 06/20/2021    HGB 8.4 (L) 06/20/2021    HCT 24.0 (L) 06/20/2021    .6 (H) 06/20/2021     06/20/2021       Lab Results   Component Value Date    GLUCOSE 93 06/20/2021    BUN 6 (L) 06/20/2021    CREATININE 0.9 06/20/2021    K 3.7 06/20/2021       Lab Results   Component Value Date    ALKPHOS 81 06/20/2021    ALT 19 06/20/2021    AST 26 06/20/2021    BILITOT 3.2 (H) 06/20/2021    BILIDIR 2.4 (H) 12/05/2020    PROT 7.4 06/20/2021       IMAGING:     XR CHEST (2 VW)  Result Date: 5/31/2021  Mild cardiomegaly. Otherwise, unremarkable radiographic views of the chest.     XR THORACIC SPINE (3 VIEWS)  Result Date: 6/9/2021  No acute osseous abnormality identified. XR LUMBAR SPINE (2-3 VIEWS)  Result Date: 6/9/2021  Normal radiographic examination of the lumbar spine. XR SHOULDER RIGHT (MIN 2 VIEWS)  Result Date: 5/31/2021  Unremarkable radiographic views of the right shoulder.      XR CHEST PORTABLE  Result Date: 6/10/2021  Stable appearing chest without acute cardiopulmonary process. XR CHEST PORTABLE  Result Date: 5/22/2021  Stable chest with no acute abnormality seen. ASSESSMENT:     Problem List Items Addressed This Visit     Sickle cell pain crisis ([facility] [address] - Primary                PLAN:     Sickle cell pain crisis  5th admission in last 2 months  Cont Hydrea   Hgb at baseline  Cont IV hydration   Continue Toradol   Continue dilaudid - will change to PCA  Missed last appt for crizanlizumab  Chronic pain mgmt remains an on-going issue. He was out of his medication. Per appt last week, he had been prescribed enough to last until Monday (tomorrow) when it would be refilled. He has been referred to pain mgmt and given a 60 day window to establish care with them and advised, otherwise, [facility] will then cease to prescribe him pain medication.     Primary oncologist Dr. Susy De La Paz to see in Sandy Alvarez MD

## 2021-06-20 NOTE — PROGRESS NOTES
Messaged cross cover Dr. Yumiko Valentin regarding breakthrough pain. New order for one time dose of dilaudid.

## 2021-06-20 NOTE — ED PROVIDER NOTES
Physical Activity:     Days of Exercise per Week:     Minutes of Exercise per Session:    Stress:     Feeling of Stress :    Social Connections:     Frequency of Communication with Friends and Family:     Frequency of Social Gatherings with Friends and Family:     Attends Hoahaoism Services:     Active Member of Clubs or Organizations:     Attends Club or Organization Meetings:     Marital Status:    Intimate Partner Violence:     Fear of Current or Ex-Partner:     Emotionally Abused:     Physically Abused:     Sexually Abused:      Current Facility-Administered Medications   Medication Dose Route Frequency Provider Last Rate Last Admin    hydroxyurea (HYDREA) chemo capsule 1,000 mg  1,000 mg Oral BID Jessica Stephens MD   1,000 mg at 06/20/21 2208    levalbuterol ([place]) nebulizer solution 0.3 mg  0.3 mg Nebulization Q4H PRN Jessica Stephens MD        docusate sodium (COLACE) capsule 100 mg  100 mg Oral Daily Jessica Stephens MD   100 mg at 06/20/21 0903    enoxaparin (LOVENOX) injection 40 mg  40 mg Subcutaneous Daily Jessica Stephens MD        ondansetron (ZOFRAN-ODT) disintegrating tablet 4 mg  4 mg Oral Q8H PRN Jessica Stephens MD        Or    ondansetron (ZOFRAN) injection 4 mg  4 mg Intravenous Q6H PRN Jessica Stephens MD        polyethylene glycol (GLYCOLAX) packet 17 g  17 g Oral Daily PRN Jessica Stephens MD        acetaminophen (TYLENOL) tablet 650 mg  650 mg Oral Q6H PRN Jessica Stephens MD        Or    acetaminophen (TYLENOL) suppository 650 mg  650 mg Rectal Q6H PRN Jessica Stephens MD        dextrose 5 % and 0.45 % sodium chloride infusion   Intravenous Continuous Jessica Stephens  mL/hr at 06/20/21 2224 New Bag at 06/20/21 2224    ketorolac (TORADOL) injection 30 mg  30 mg Intravenous TID Jessica Stephens MD   30 mg at 06/20/21 2006    naloxone (NARCAN) injection 0.4 mg  0.4 mg Intravenous PRN MD Elan Graham HYDROmorphone (DILAUDID) 0.2 mg/mL PCA   Intravenous Continuous Dolores Rahman MD   New Bag at 06/20/21 2004    lidocaine 1 % injection 5 mL  5 mL Intradermal Once Dolores Rahman MD        sodium chloride flush 0.9 % injection 5-40 mL  5-40 mL Intravenous 2 times per day Dolores Rahman MD        sodium chloride flush 0.9 % injection 5-40 mL  5-40 mL Intravenous PRN Christine Alvarez MD        0.9 % sodium chloride infusion  25 mL Intravenous PRN Dolores Rahman MD        budesonide-formoterol (SYMBICORT) 160-4.5 MCG/ACT inhaler 2 puff  2 puff Inhalation PRN William Ames MD         Allergies   Allergen Reactions    Morphine Shortness Of Breath     Other reaction(s): Histamine-like Reaction  Has asthma exacerbation with morphine-histamine type reaction         REVIEW OF SYSTEMS  10 systems reviewed, pertinent positives per HPI otherwise noted to be negative. PHYSICAL EXAM  /68   Pulse 68   Temp 98.1 °F (36.7 °C) (Oral)   Resp 16   Ht 5' 8\" (1.727 m)   Wt 185 lb (83.9 kg)   SpO2 97%   BMI 28.13 kg/m²   GENERAL APPEARANCE: Awake and alert. Cooperative. Acute painful and anxious distress. HEAD: Normocephalic. Atraumatic. EYES: PERRL. EOM's grossly intact. Test of Skew  ENT: Mucous membranes are moist.   NECK: Supple, trachea midline. HEART: RRR. Normal S1S2, no rubs, gallops, or murmurs noted  LUNGS: Respirations unlabored. CTAB. Good air exchange. No wheezes, rales, or rhonchi. Speaking comfortably in full sentences. ABDOMEN: Soft. Non-distended. Non-tender. No guarding or rebound. Normal bowel sounds. EXTREMITIES: No peripheral edema. MAEE. No acute deformities. No warmth or swelling to his knees bilaterally  SKIN: Warm and dry. No acute rashes. NEUROLOGICAL: Alert and oriented X 3. CN II-XII intact. No gross facial drooping. Strength 5/5, sensation intact. Normal coordination. No pronator drift.   No truncal instability; Gait normal.   PSYCHIATRIC: Normal mood and affect. LABS  I have reviewed all labs for this visit.    Results for orders placed or performed during the hospital encounter of 06/20/21   CBC Auto Differential   Result Value Ref Range    WBC 15.9 (H) 4.0 - 11.0 K/uL    RBC 2.37 (L) 4.20 - 5.90 M/uL    Hemoglobin 8.4 (L) 13.5 - 17.5 g/dL    Hematocrit 24.0 (L) 40.5 - 52.5 %    .6 (H) 80.0 - 100.0 fL    MCH 35.4 (H) 26.0 - 34.0 pg    MCHC 34.8 31.0 - 36.0 g/dL    RDW 23.2 (H) 12.4 - 15.4 %    Platelets 364 841 - 818 K/uL    MPV 8.1 5.0 - 10.5 fL    PLATELET SLIDE REVIEW Adequate     SLIDE REVIEW see below     Path Consult No     Neutrophils % 67.9 %    Lymphocytes % 19.2 %    Monocytes % 11.4 %    Eosinophils % 0.9 %    Basophils % 0.6 %    Neutrophils Absolute 10.8 (H) 1.7 - 7.7 K/uL    Lymphocytes Absolute 3.1 1.0 - 5.1 K/uL    Monocytes Absolute 1.8 (H) 0.0 - 1.3 K/uL    Eosinophils Absolute 0.1 0.0 - 0.6 K/uL    Basophils Absolute 0.1 0.0 - 0.2 K/uL   Comprehensive Metabolic Panel w/ Reflex to MG   Result Value Ref Range    Sodium 141 136 - 145 mmol/L    Potassium reflex Magnesium 3.7 3.5 - 5.1 mmol/L    Chloride 107 99 - 110 mmol/L    CO2 23 21 - 32 mmol/L    Anion Gap 11 3 - 16    Glucose 93 70 - 99 mg/dL    BUN 6 (L) 7 - 20 mg/dL    CREATININE 0.9 0.9 - 1.3 mg/dL    GFR Non-African American >60 >60    GFR African American >60 >60    Calcium 9.1 8.3 - 10.6 mg/dL    Total Protein 7.4 6.4 - 8.2 g/dL    Albumin 4.5 3.4 - 5.0 g/dL    Albumin/Globulin Ratio 1.6 1.1 - 2.2    Total Bilirubin 3.2 (H) 0.0 - 1.0 mg/dL    Alkaline Phosphatase 81 40 - 129 U/L    ALT 19 10 - 40 U/L    AST 26 15 - 37 U/L    Globulin 2.9 g/dL   Reticulocytes   Result Value Ref Range    Retic Ct Pct 5.66 (H) 0.50 - 2.18 %    Retic Ct Abs 0.134 M/uL    Immature Retic Fract 0.60 (H) 0.21 - 0.37   Lactate Dehydrogenase   Result Value Ref Range     (H) 100 - 190 U/L       Cardiac Monitoring: Sinus rhythm without ectopy        RADIOLOGY  X-RAYS:  I have reviewed radiologic plain film image(s). ALL OTHER NON-PLAIN FILM IMAGES SUCH AS CT, ULTRASOUND AND MRI HAVE BEEN READ BY THE RADIOLOGIST. No orders to display              Rechecks: Physical assessment performed. Pain improved in the ED; response to treatment did very. At times he was somnolent and drowsy after being treated with narcotic pain medications; within an hour again writhing in pain. ED COURSE/MDM  Patient seen and evaluated. Old records reviewed. Labs and imaging reviewed and results discussed with patient. Patient was given Toradol; IV fluids; and Dilaudid in the ED with good symptomatic relief. Patient was reassessed as noted above . Patient is was initially very upset that an peripheral IV could not be obtained; he requested an ultrasound-guided deep vein IV placement; I explained to him that that might not be as successful as external jugular vein IV which was quite visible upon physical exam.  Although he was very tearful he eventually agreed to let me try to put the IV there so as to expeditiously provide him with pain medication. He likely will require a midline PICC ; PCA pump and other more permanent access. Many of his peripheral veins can no longer be accessed secondary to frequent IVs from hospitalizations. Review of the chart suggests he comes to the ED for acute pain relief quite frequently proximately twice a month and his hematologist is concerned about the extent to which he relies on IV rather than oral narcotics. Currently has no care plan. Plan of care discussed with patient and he agrees with admission. Patient was given scripts for the following medications. I counseled patient how to take these medications. Current Discharge Medication List          CLINICAL IMPRESSION  1. Sickle cell pain crisis (HCC)        Blood pressure 132/68, pulse 68, temperature 98.1 °F (36.7 °C), temperature source Oral, resp.  rate 16, height 5' 8\" (1.727 m), weight 185 lb (83.9 kg), SpO2 97 %. [facility] was discharged to admitted in stable condition.       Maida Poe MD  06/21/21 8445

## 2021-06-20 NOTE — ED NOTES
Pt verbalized he's \"a tough stick\". Writer attempted PIV without success. Pt verbalized that we usually use the ultrasound to obtain PIV. Current staff is not trained in [address] [facility] guided PIV. Dr. Alphonso Goldstein suggested we change medicine to IM injections but patient preferred IV medicine. Dr. Alphonso Goldstein replied that he was willing to put in an US guided IV but would rather access the neck then the arm. Pt was left to decide which course of action to take. Pt called back that he wanted an US guided PIV in his arm. Writer brought pt request to Dr. Alphonso Goldstein. Dr. Abhay Vega went into patient's room to discuss options again. Phlebotomist was at patient bedside attempting to draw labs from patient and was also unsuccessful. Dr Alphonso Goldstein convinced pt to try an IV in his neck but pt wanted numbing agent or to be sedated. Dr Alphonso Goldstein advised that this was not a central line but only a peripheral and would not hurt any worse then a regular \"stick\". Pt was crying and shaking and was willing to allow us to proceed but had difficulty relaxing during IV insertion. Right external jugular IV was achieved and medicine administered. Pt apologized for screaming and asked for warm blankets to help with his residual tremors.       Suma Abreu RN  06/20/21 1400

## 2021-06-20 NOTE — ED TRIAGE NOTES
Presents with c/o bilateral knee pain that is similar to his sickle cell pain. Reporting took 20MG oxycodone at 0100 with no relief of pain. Upon arrival via wheelchair c/o pain with ambulation.  Was able to self transfer from wheelchair to ER bed with a steady gait

## 2021-06-20 NOTE — ED NOTES
Patient identified as a positive fall risk on the ED triage fall screening. Patient placed in fall precautions which includes:  yellow fall risk bracelet on wrist,patient wearing shoes, \"Be Safe\" sign placed on patient's door, and bed alarm placed under patient/alarm turned on. Patient instructed on importance of not getting out of bed or ambulating without assistance for safety.              Jorge Diaz RN  06/20/21 1752

## 2021-06-20 NOTE — PROGRESS NOTES
Messaged Dr. Viridiana Marti regarding continued 10/10 pain. Pt stating he is not finding any relief. Instructed to defer to hospitalist if needed at this time.

## 2021-06-21 ENCOUNTER — APPOINTMENT (OUTPATIENT)
Dept: GENERAL RADIOLOGY | Age: 21
DRG: 812 | End: 2021-06-21
Payer: COMMERCIAL

## 2021-06-21 ENCOUNTER — APPOINTMENT (OUTPATIENT)
Dept: INTERVENTIONAL RADIOLOGY/VASCULAR | Age: 21
DRG: 812 | End: 2021-06-21
Payer: COMMERCIAL

## 2021-06-21 LAB
INR BLD: 1.54 (ref 0.86–1.14)
PROTHROMBIN TIME: 18 SEC (ref 10–13.2)

## 2021-06-21 PROCEDURE — 83020 HEMOGLOBIN ELECTROPHORESIS: CPT

## 2021-06-21 PROCEDURE — 6370000000 HC RX 637 (ALT 250 FOR IP): Performed by: INTERNAL MEDICINE

## 2021-06-21 PROCEDURE — 6360000002 HC RX W HCPCS: Performed by: INTERNAL MEDICINE

## 2021-06-21 PROCEDURE — 2700000000 HC OXYGEN THERAPY PER DAY

## 2021-06-21 PROCEDURE — 36415 COLL VENOUS BLD VENIPUNCTURE: CPT

## 2021-06-21 PROCEDURE — 85610 PROTHROMBIN TIME: CPT

## 2021-06-21 PROCEDURE — 2580000003 HC RX 258: Performed by: INTERNAL MEDICINE

## 2021-06-21 PROCEDURE — C1769 GUIDE WIRE: HCPCS

## 2021-06-21 PROCEDURE — 36573 INSJ PICC RS&I 5 YR+: CPT

## 2021-06-21 PROCEDURE — 02HV33Z INSERTION OF INFUSION DEVICE INTO SUPERIOR VENA CAVA, PERCUTANEOUS APPROACH: ICD-10-PCS | Performed by: RADIOLOGY

## 2021-06-21 PROCEDURE — 1200000000 HC SEMI PRIVATE

## 2021-06-21 PROCEDURE — 94761 N-INVAS EAR/PLS OXIMETRY MLT: CPT

## 2021-06-21 PROCEDURE — 73560 X-RAY EXAM OF KNEE 1 OR 2: CPT

## 2021-06-21 RX ORDER — CYCLOBENZAPRINE HCL 10 MG
10 TABLET ORAL 3 TIMES DAILY PRN
Status: DISCONTINUED | OUTPATIENT
Start: 2021-06-21 | End: 2021-06-23 | Stop reason: HOSPADM

## 2021-06-21 RX ORDER — HYDROMORPHONE HCL 110MG/55ML
0.5 PATIENT CONTROLLED ANALGESIA SYRINGE INTRAVENOUS ONCE
Status: COMPLETED | OUTPATIENT
Start: 2021-06-21 | End: 2021-06-21

## 2021-06-21 RX ORDER — OXYCODONE HYDROCHLORIDE 10 MG/1
10 TABLET ORAL EVERY 4 HOURS PRN
COMMUNITY

## 2021-06-21 RX ADMIN — KETOROLAC TROMETHAMINE 30 MG: 30 INJECTION, SOLUTION INTRAMUSCULAR; INTRAVENOUS at 20:02

## 2021-06-21 RX ADMIN — Medication: at 16:33

## 2021-06-21 RX ADMIN — HYDROXYUREA 1000 MG: 500 CAPSULE ORAL at 09:40

## 2021-06-21 RX ADMIN — KETOROLAC TROMETHAMINE 30 MG: 30 INJECTION, SOLUTION INTRAMUSCULAR; INTRAVENOUS at 16:53

## 2021-06-21 RX ADMIN — HYDROMORPHONE HYDROCHLORIDE 0.5 MG: 2 INJECTION, SOLUTION INTRAMUSCULAR; INTRAVENOUS; SUBCUTANEOUS at 07:41

## 2021-06-21 RX ADMIN — Medication: at 05:34

## 2021-06-21 RX ADMIN — DEXTROSE AND SODIUM CHLORIDE 125 ML/HR: 5; 450 INJECTION, SOLUTION INTRAVENOUS at 07:45

## 2021-06-21 RX ADMIN — ONDANSETRON 4 MG: 4 TABLET, ORALLY DISINTEGRATING ORAL at 03:33

## 2021-06-21 RX ADMIN — HYDROXYUREA 1000 MG: 500 CAPSULE ORAL at 20:02

## 2021-06-21 RX ADMIN — KETOROLAC TROMETHAMINE 30 MG: 30 INJECTION, SOLUTION INTRAMUSCULAR; INTRAVENOUS at 09:26

## 2021-06-21 RX ADMIN — DOCUSATE SODIUM 100 MG: 100 CAPSULE, LIQUID FILLED ORAL at 09:42

## 2021-06-21 ASSESSMENT — PAIN DESCRIPTION - PAIN TYPE
TYPE: ACUTE PAIN
TYPE: ACUTE PAIN

## 2021-06-21 ASSESSMENT — PAIN SCALES - GENERAL
PAINLEVEL_OUTOF10: 10
PAINLEVEL_OUTOF10: 8
PAINLEVEL_OUTOF10: 8
PAINLEVEL_OUTOF10: 10
PAINLEVEL_OUTOF10: 5
PAINLEVEL_OUTOF10: 10
PAINLEVEL_OUTOF10: 8
PAINLEVEL_OUTOF10: 10
PAINLEVEL_OUTOF10: 9
PAINLEVEL_OUTOF10: 8

## 2021-06-21 ASSESSMENT — PAIN DESCRIPTION - DESCRIPTORS: DESCRIPTORS: SHARP

## 2021-06-21 ASSESSMENT — PAIN DESCRIPTION - LOCATION
LOCATION: KNEE
LOCATION: KNEE

## 2021-06-21 ASSESSMENT — PAIN DESCRIPTION - ORIENTATION
ORIENTATION: RIGHT
ORIENTATION: RIGHT

## 2021-06-21 NOTE — CARE COORDINATION
Writer attempted to assess, off the floor at this time. YANN Estevez  [phone]: Patient resting at this time not open to interview, will ct to assess. Writer received call from [facility]  with Shaista Craig [phone] able to assist with discharge planning. Chart review lives at home with parents, no assistive device. YANN Estevez

## 2021-06-21 NOTE — PROGRESS NOTES
Hospitalist Progress Note      PCP: Venkat Edmonds MD    Date of Admission: 6/20/2021    Chief Complaint:  Bilateral knee pain      Subjective:  He complains of pain, mostly in his R knee. Medications:  Reviewed    Infusion Medications    dextrose 5 % and 0.45 % NaCl 125 mL/hr at 06/21/21 0935    HYDROmorphone      sodium chloride       Scheduled Medications    hydroxyurea  1,000 mg Oral BID    docusate sodium  100 mg Oral Daily    enoxaparin  40 mg Subcutaneous Daily    ketorolac  30 mg Intravenous TID    lidocaine 1 % injection  5 mL Intradermal Once    sodium chloride flush  5-40 mL Intravenous 2 times per day     PRN Meds: cyclobenzaprine, levalbuterol, ondansetron **OR** ondansetron, polyethylene glycol, acetaminophen **OR** acetaminophen, naloxone, sodium chloride flush, sodium chloride, budesonide-formoterol      Intake/Output Summary (Last 24 hours) at 6/21/2021 1001  Last data filed at 6/21/2021 0935  Gross per 24 hour   Intake 3964.22 ml   Output 2500 ml   Net 1464.22 ml       Physical Exam Performed:    BP (!) 139/46   Pulse 77   Temp 98.6 °F (37 °C)   Resp 21   Ht 5' 8\" (1.727 m)   Wt 185 lb (83.9 kg)   SpO2 95%   BMI 28.13 kg/m²     General appearance: No apparent distress, appears stated age. HEENT: Pupils equal, round, and reactive to light. Conjunctivae/corneas clear. Neck: Supple, with full range of motion. No jugular venous distention. Trachea midline. Respiratory:  Normal respiratory effort. Clear to auscultation, bilaterally without Rales/Wheezes/Rhonchi. Cardiovascular: Regular rate and rhythm with normal S1/S2 without murmurs, rubs or gallops. Abdomen: Soft, non-tender, non-distended with normal bowel sounds. Musculoskeletal: No clubbing, cyanosis or edema bilaterally. Full range of motion without deformity. Skin: Skin color, texture, turgor normal.  No rashes or lesions.   Neurologic:  Neurovascularly intact without any focal sensory/motor deficits. Cranial nerves: II-XII intact, grossly non-focal.  Psychiatric: Alert and oriented, thought content appropriate, normal insight. Flat affect. Capillary Refill: Brisk,3 seconds, normal   Peripheral Pulses: +2 palpable, equal bilaterally       Labs:   Recent Labs     06/20/21 0406   WBC 15.9*   HGB 8.4*   HCT 24.0*        Recent Labs     06/20/21 0406      K 3.7      CO2 23   BUN 6*   CREATININE 0.9   CALCIUM 9.1     Recent Labs     06/20/21 0406   AST 26   ALT 19   BILITOT 3.2*   ALKPHOS 81     No results for input(s): INR in the last 72 hours. No results for input(s): Dilip Snowball in the last 72 hours. Urinalysis:      Lab Results   Component Value Date    NITRU Negative 06/09/2021    WBCUA None seen 06/09/2021    BACTERIA Rare 06/09/2021    RBCUA 0-2 06/09/2021    BLOODU TRACE-INTACT 06/09/2021    SPECGRAV 1.010 06/09/2021    GLUCOSEU Negative 06/09/2021       Radiology:  XR KNEE RIGHT (1-2 VIEWS)    (Results Pending)   IR PICC WO SQ PORT/PUMP > 5 YEARS    (Results Pending)           Assessment/Plan:    Active Hospital Problems    Diagnosis     Sickle cell pain crisis ([facility] [address] [D57.00]     Asthma [J45.909]        \"Patient with PMH of sickle cell followed by Dr. Xiao, recently admitted to [facility] on 6/10 and discharged on 6/14 for sickle cell pain crisis presents to the ED today with complaints of pain all over, specially worse in the knees. Pt has been admitted already 4 times since May for sickle cell pain crisis. This will be his fifth. Patient reports 10/10 pain in both his knees and legs, took 20 mg of oxycodone just prior to arrival without much relief in pain. Patient was prescribed 60 tablets of oxycodone 10 mg each when he was discharged here on 6/14. Patient reports that he ran out of it yesterday. He has been taking 20 mg every 4 hours at home. \"      Sickle cell pain crisis. IVFs, hydroxyurea, analgesia.   Appreciate hematology input.  - f/u knee Xray    Sickle cell anemia. Monitor Hb. Asthma. Inhaled bronchodilators. DVT Prophylaxis: enoxaparin  Diet: ADULT DIET; Regular  Code Status: Full Code    PT/OT Eval Status: not indicated    Dispo - when his pain is better controlled. Will evaluate for discharge each day. He lives at home.       Antonette Robert MD

## 2021-06-21 NOTE — PROGRESS NOTES
ONCOLOGY HEMATOLOGY CARE PROGRESS NOTE      SUBJECTIVE:     The patient states his right knee is extremely painful. He denies fever, chills, nausea or vomiting. He feels that it may be a little swollen. He states that his mother is rationing his pain medication and will not give him more pain medication when he is hurting. He denies trauma to the knee or warmth. ROS:     Constitutional:  No weight loss, No fever, No chills, No night sweats. Energy level good. Eyes:  No impairment or change in vision  ENT / Mouth:  No pain, abnormal ulceration, bleeding, nasal drip or change in voice or hearing  Cardiovascular:  No chest pain, palpitations, new edema, or calf discomfort  Respiratory:  No pain, hemoptysis, change to breathing  Breast:  No pain, discharge, change in appearance or texture  Gastrointestinal:  No pain, cramping, jaundice, change to eating and bowel habits  Urinary:  No pain, bleeding or change in continence  Genitalia: No pain, bleeding or discharge  Musculoskeletal:  See above. Skin:  No pruritus, rash, change to nodules or lesions  Neurologic:  No discomfort, change in mental status, speech, sensory or motor activity  Psychiatric:  No change in concentration or change to affect or mood  Endocrine:  No hot flashes, increased thirst, or change to urine production  Hematologic: No petechiae, ecchymosis or bleeding  Lymphatic:  No lymphadenopathy or lymphedema  Allergy / Immunologic:  No eczema, hives, frequent or recurrent infections    OBJECTIVE        Physical    VITALS:  BP (!) 139/46   Pulse 77   Temp 98.6 °F (37 °C)   Resp 21   Ht 5' 8\" (1.727 m)   Wt 185 lb (83.9 kg)   SpO2 95%   BMI 28.13 kg/m²   TEMPERATURE:  Current - Temp: 98.6 °F (37 °C);  Max - Temp  Av.2 °F (36.8 °C)  Min: 97.6 °F (36.4 °C)  Max: 98.6 °F (37 °C)  PULSE OXIMETRY RANGE: SpO2  Av.9 %  Min: 90 %  Max: 97 %  24HR INTAKE/OUTPUT:      Intake/Output Summary (Last 24 hours) at 6/21/2021 0813  Last data filed at 6/20/2021 1841  Gross per 24 hour   Intake 2512.22 ml   Output 2200 ml   Net 312.22 ml       CONSTITUTIONAL: awake, alert, cooperative, no apparent distress   EYES: pupils equal, round and reactive to light, sclera clear and conjunctiva normal  ENT: Normocephalic, without obvious abnormality, atraumatic  NECK: supple, symmetrical, no jugular venous distension and no carotid bruits   HEMATOLOGIC/LYMPHATIC: no cervical, supraclavicular or axillary lymphadenopathy   LUNGS: no increased work of breathing and clear to auscultation   CARDIOVASCULAR: regular rate and rhythm, normal S1 and S2, no murmur noted  ABDOMEN: normal bowel sounds x 4, soft, non-distended, non-tender, no masses palpated, no hepatosplenomgaly   MUSCULOSKELETAL: full range of motion noted, tone is normal. The right knee has full range of motion. It is minimally swollen. It is not particularly warm. NEUROLOGIC: awake, alert, oriented to name, place and time. Motor skills grossly intact. SKIN: Normal skin color, texture, turgor and no jaundice.  appears intact   EXTREMITIES: no LE edema       Data      Recent Labs     06/20/21  0406   WBC 15.9*   HGB 8.4*   HCT 24.0*      .6*        Recent Labs     06/20/21  0406      K 3.7      CO2 23   BUN 6*   CREATININE 0.9     Recent Labs     06/20/21 0406   AST 26   ALT 19   BILITOT 3.2*   ALKPHOS 81       Magnesium:    Lab Results   Component Value Date    MG 1.80 05/05/2021    MG 1.80 05/04/2021    MG 1.90 01/12/2021         Problem List  Patient Active Problem List   Diagnosis    Sick[facility] pain crisis ([facility] [address]    Asthma    Sickle cell crisis ([facility] [address]    Sepsis ([facility] [address]    Opiate overdose ([facility] [address]    Opiate antagonist poisoning, accidental or unintentional, initial encounter    Leukocytosis    Hypoxia    Anemia    Other chest pain    Pneumonia due to infectious organism    Fever    Chronic prescription opiate use       ASSESSMENT AND PLAN:    Sickle cell crisis:  -Hemoglobin electrophoresis will be checked to see if his hemoglobin F is increased  -If his hemoglobin electrophoresis does not demonstrate any crease in hemoglobin F, his Hydrea will need to be adjusted  -I do worry about compliance  -If his pain does not subside, he will be given blood or an exchange transfusion    Pain:  -I doubled his PCA  -Given one-time bolus of Dilaudid  -X-ray of the right knee was ordered    Compliance:  -He missed his last dose of crizanlizumab  -He was supposed to bring his parents in for a conference, because he states his mother is taking his medication and rationing it. He states she will not give it to him when he is in pain.  -He denies that she is selling it or using it.  -I reiterated to him today that he is 60 days to find a pain specialist.  I believe this is quite generous, since we are only required to give him 30 days notice.   -He agreed to a family conference to discuss his pain medication use, but no one ever shows up. Since he is an adult, he did not even have to agree to this, but I am trying to figure out what is going on with his pain control at home. He is coming to the hospital repetitively due to ineffective pain control.  -He did give me permission to talk to his mother today and I will call her.           ONCOLOGIC DISPOSITION:  -When his pain is controlled    Juvenal Samano MD  Please contact through [facility]

## 2021-06-21 NOTE — PROGRESS NOTES
Patient assessment complete and documented. VSS. A&O x4. Patient is currently in bed with complaints of pain 10 out 10. Patient is currently on a Dilaudid PCA pump. Patient was given a one time dose of 0.5 Dilaudid as well (see MAR). Patient states his pain at the moment is in his right knee. Bed is in the lowest locked position at this time with call light within reach.  Will continue to monitor situation

## 2021-06-21 NOTE — PROGRESS NOTES
PICC/CVC insertion Procedure Note    Javier Leggett 89   Admitted- 6/20/2021  2:34 AM  Admission diagnosis- Sickle cell pain crisis [facility]) [D57.00]      Attending Physician- Elizabeth Delgado, *  Ordering Physician- Dr. Leslee Alvarez  Indication for Insertion: Limited Access    Lab Results   Component Value Date    INR 1.54 (H) 06/21/2021    PROTIME 18.0 (H) 06/21/2021     Lab Results   Component Value Date    WBC 15.9 (H) 06/20/2021    HGB 8.4 (L) 06/20/2021    HCT 24.0 (L) 06/20/2021     06/20/2021     Lab Results   Component Value Date    CREATININE 0.9 06/20/2021    BUN 6 (L) 06/20/2021       Catheter Insertion Date- 6/21/2021   Catheter Brand- Arrow Antimicrobial/Antithrombogenic   Lot Number- [id number]  Lumen-Dual    Insertion Site- Right Brachial  Vein Diameter- .50 cm  Icelandic Size- 5.5  Catheter Length- 37 cm  Exposed Catheter Length- 0cm   PICC Tip Terminates in the Cavo Atrial Junction- Yes  Easy insertion- Yes  Able to Aspirate Blood- Yes  Easy Flush- Yes    PICC Placement Confirmation- xray  PICC insertion successful- Yes  Ultrasound- yes    Okay To Use PICC- \"Yes per Dr Jah Alva RN made aware    NURSES:  *please replace all existing IV tubing with new IV tubing prior to using the PICC for current IV infusions. *please remove any PIVs from right arm. *please refrain from obtaining BPs in the right arm. All of the above may be sources of infection or damage to the PICC line/site.     Electronically signed by Martina Fraga, RN, RN on 6/21/2021 at 11:49 AM

## 2021-06-22 LAB
ABO/RH: NORMAL
ANION GAP SERPL CALCULATED.3IONS-SCNC: 5 MMOL/L (ref 3–16)
ANION GAP SERPL CALCULATED.3IONS-SCNC: 7 MMOL/L (ref 3–16)
ANTIBODY SCREEN: NORMAL
BASOPHILS ABSOLUTE: 0 K/UL (ref 0–0.2)
BASOPHILS RELATIVE PERCENT: 0.2 %
BUN BLDV-MCNC: 7 MG/DL (ref 7–20)
BUN BLDV-MCNC: 8 MG/DL (ref 7–20)
CALCIUM SERPL-MCNC: 8.5 MG/DL (ref 8.3–10.6)
CALCIUM SERPL-MCNC: 9.1 MG/DL (ref 8.3–10.6)
CHLORIDE BLD-SCNC: 98 MMOL/L (ref 99–110)
CHLORIDE BLD-SCNC: 99 MMOL/L (ref 99–110)
CO2: 24 MMOL/L (ref 21–32)
CO2: 27 MMOL/L (ref 21–32)
CREAT SERPL-MCNC: 0.6 MG/DL (ref 0.9–1.3)
CREAT SERPL-MCNC: 0.7 MG/DL (ref 0.9–1.3)
EOSINOPHILS ABSOLUTE: 0.1 K/UL (ref 0–0.6)
EOSINOPHILS RELATIVE PERCENT: 0.7 %
GFR AFRICAN AMERICAN: >60
GFR AFRICAN AMERICAN: >60
GFR NON-AFRICAN AMERICAN: >60
GFR NON-AFRICAN AMERICAN: >60
GLUCOSE BLD-MCNC: 397 MG/DL (ref 70–99)
GLUCOSE BLD-MCNC: 95 MG/DL (ref 70–99)
HCT VFR BLD CALC: 21.8 % (ref 40.5–52.5)
HEMATOLOGY PATH CONSULT: NO
HEMOGLOBIN: 7.6 G/DL (ref 13.5–17.5)
LYMPHOCYTES ABSOLUTE: 2.4 K/UL (ref 1–5.1)
LYMPHOCYTES RELATIVE PERCENT: 15.1 %
MCH RBC QN AUTO: 34.6 PG (ref 26–34)
MCHC RBC AUTO-ENTMCNC: 34.7 G/DL (ref 31–36)
MCV RBC AUTO: 99.7 FL (ref 80–100)
MONOCYTES ABSOLUTE: 1.6 K/UL (ref 0–1.3)
MONOCYTES RELATIVE PERCENT: 10.4 %
NEUTROPHILS ABSOLUTE: 11.7 K/UL (ref 1.7–7.7)
NEUTROPHILS RELATIVE PERCENT: 73.6 %
PDW BLD-RTO: 20.9 % (ref 12.4–15.4)
PLATELET # BLD: 347 K/UL (ref 135–450)
PMV BLD AUTO: 8.2 FL (ref 5–10.5)
POTASSIUM REFLEX MAGNESIUM: 3.9 MMOL/L (ref 3.5–5.1)
POTASSIUM REFLEX MAGNESIUM: 4.4 MMOL/L (ref 3.5–5.1)
RBC # BLD: 2.18 M/UL (ref 4.2–5.9)
SODIUM BLD-SCNC: 129 MMOL/L (ref 136–145)
SODIUM BLD-SCNC: 131 MMOL/L (ref 136–145)
WBC # BLD: 15.9 K/UL (ref 4–11)

## 2021-06-22 PROCEDURE — 85025 COMPLETE CBC W/AUTO DIFF WBC: CPT

## 2021-06-22 PROCEDURE — 1200000000 HC SEMI PRIVATE

## 2021-06-22 PROCEDURE — 6360000002 HC RX W HCPCS: Performed by: INTERNAL MEDICINE

## 2021-06-22 PROCEDURE — 36430 TRANSFUSION BLD/BLD COMPNT: CPT

## 2021-06-22 PROCEDURE — 2580000003 HC RX 258: Performed by: INTERNAL MEDICINE

## 2021-06-22 PROCEDURE — 85660 RBC SICKLE CELL TEST: CPT

## 2021-06-22 PROCEDURE — 80048 BASIC METABOLIC PNL TOTAL CA: CPT

## 2021-06-22 PROCEDURE — 36415 COLL VENOUS BLD VENIPUNCTURE: CPT

## 2021-06-22 PROCEDURE — 94761 N-INVAS EAR/PLS OXIMETRY MLT: CPT

## 2021-06-22 PROCEDURE — 86850 RBC ANTIBODY SCREEN: CPT

## 2021-06-22 PROCEDURE — 86902 BLOOD TYPE ANTIGEN DONOR EA: CPT

## 2021-06-22 PROCEDURE — P9016 RBC LEUKOCYTES REDUCED: HCPCS

## 2021-06-22 PROCEDURE — 86923 COMPATIBILITY TEST ELECTRIC: CPT

## 2021-06-22 PROCEDURE — 2700000000 HC OXYGEN THERAPY PER DAY

## 2021-06-22 PROCEDURE — 6370000000 HC RX 637 (ALT 250 FOR IP): Performed by: INTERNAL MEDICINE

## 2021-06-22 PROCEDURE — 86901 BLOOD TYPING SEROLOGIC RH(D): CPT

## 2021-06-22 PROCEDURE — 86900 BLOOD TYPING SEROLOGIC ABO: CPT

## 2021-06-22 RX ORDER — SODIUM CHLORIDE 9 MG/ML
INJECTION, SOLUTION INTRAVENOUS PRN
Status: DISCONTINUED | OUTPATIENT
Start: 2021-06-22 | End: 2021-06-23 | Stop reason: HOSPADM

## 2021-06-22 RX ADMIN — HYDROXYUREA 1000 MG: 500 CAPSULE ORAL at 20:26

## 2021-06-22 RX ADMIN — Medication 10 ML: at 20:17

## 2021-06-22 RX ADMIN — Medication: at 04:46

## 2021-06-22 RX ADMIN — DOCUSATE SODIUM 100 MG: 100 CAPSULE, LIQUID FILLED ORAL at 09:12

## 2021-06-22 RX ADMIN — Medication: at 15:43

## 2021-06-22 RX ADMIN — DEXTROSE AND SODIUM CHLORIDE: 5; 450 INJECTION, SOLUTION INTRAVENOUS at 01:16

## 2021-06-22 RX ADMIN — HYDROXYUREA 1000 MG: 500 CAPSULE ORAL at 10:31

## 2021-06-22 RX ADMIN — KETOROLAC TROMETHAMINE 30 MG: 30 INJECTION, SOLUTION INTRAMUSCULAR; INTRAVENOUS at 20:26

## 2021-06-22 RX ADMIN — KETOROLAC TROMETHAMINE 30 MG: 30 INJECTION, SOLUTION INTRAMUSCULAR; INTRAVENOUS at 09:12

## 2021-06-22 ASSESSMENT — PAIN DESCRIPTION - ORIENTATION: ORIENTATION: RIGHT

## 2021-06-22 ASSESSMENT — PAIN SCALES - GENERAL
PAINLEVEL_OUTOF10: 9
PAINLEVEL_OUTOF10: 8
PAINLEVEL_OUTOF10: 10
PAINLEVEL_OUTOF10: 8
PAINLEVEL_OUTOF10: 6

## 2021-06-22 ASSESSMENT — PAIN DESCRIPTION - LOCATION: LOCATION: KNEE

## 2021-06-22 ASSESSMENT — PAIN DESCRIPTION - PAIN TYPE: TYPE: ACUTE PAIN

## 2021-06-22 NOTE — PROGRESS NOTES
06/22/21 1611   Oxygen Therapy/Pulse Ox   O2 Device Nasal cannula   O2 Flow Rate (L/min) 2 L/min   Resp 15   SpO2 100 %   End Tidal CO2 44

## 2021-06-22 NOTE — PROGRESS NOTES
06/22/21 0847   Oxygen Therapy/Pulse Ox   O2 Therapy Oxygen   $Oxygen $Daily Charge   O2 Device Nasal cannula   O2 Flow Rate (L/min) 2 L/min   Resp 18   SpO2 99 %   $Pulse Oximeter $Spot check (multiple/continuous)   End Tidal CO2 43

## 2021-06-22 NOTE — PLAN OF CARE
Problem: Falls - Risk of:  Goal: Will remain free from falls  Description: Will remain free from falls  Outcome: Ongoing  Pt free from falls and injury. Bed alarm on and working. Carmine Cardona RN   Problem: Pain:  Goal: Pain level will decrease  Description: Pain level will decrease  Outcome: Ongoing   Pt is using pca to control pain. Pain button within reach. Carmine Cardona RN

## 2021-06-22 NOTE — PROGRESS NOTES
Pt family is at bedside. Family updated. Pt is also very restless at times. And will continue to monitor. Farrah Aj RN

## 2021-06-22 NOTE — PROGRESS NOTES
Hospitalist Progress Note      PCP: Chirag Gamez MD    Date of Admission: 6/20/2021    Chief Complaint:  Bilateral knee pain      Subjective:  Resting in bed with eyes closed when I look from outside the room. When I walk in and greet him he complains of ongoing pain, still mostly in the R knee. I mention that it looks like his R leg has stopped swaying (yesterday it was kind of rocking from side to side, reportedly involuntarily, during my visit). After I make this comment the leg movement starts up again. Medications:  Reviewed    Infusion Medications    dextrose 5 % and 0.45 % NaCl 125 mL/hr at 06/22/21 0117    HYDROmorphone      sodium chloride       Scheduled Medications    hydroxyurea  1,000 mg Oral BID    docusate sodium  100 mg Oral Daily    enoxaparin  40 mg Subcutaneous Daily    ketorolac  30 mg Intravenous TID    lidocaine 1 % injection  5 mL Intradermal Once    sodium chloride flush  5-40 mL Intravenous 2 times per day     PRN Meds: cyclobenzaprine, levalbuterol, ondansetron **OR** ondansetron, polyethylene glycol, acetaminophen **OR** acetaminophen, naloxone, sodium chloride flush, sodium chloride, budesonide-formoterol      Intake/Output Summary (Last 24 hours) at 6/22/2021 0845  Last data filed at 6/22/2021 9887  Gross per 24 hour   Intake 7959.56 ml   Output 2275 ml   Net 5684.56 ml       Physical Exam Performed:    BP (!) 105/57   Pulse 71   Temp 98.4 °F (36.9 °C) (Oral)   Resp 16   Ht 5' 8\" (1.727 m)   Wt 185 lb (83.9 kg)   SpO2 99%   BMI 28.13 kg/m²     General appearance: No apparent distress, appears stated age. HEENT: Pupils equal, round, and reactive to light. Conjunctivae/corneas clear. Neck: Supple, with full range of motion. No jugular venous distention. Trachea midline. Respiratory:  Normal respiratory effort. Clear to auscultation, bilaterally without Rales/Wheezes/Rhonchi.   Cardiovascular: Regular rate and rhythm with normal S1/S2 without murmurs, rubs or gallops. Abdomen: Soft, non-tender, non-distended with normal bowel sounds. Musculoskeletal: No clubbing, cyanosis or edema bilaterally. Full range of motion without deformity. Skin: Skin color, texture, turgor normal.  No rashes or lesions. Neurologic:  Neurovascularly intact without any focal sensory/motor deficits. Cranial nerves: II-XII intact, grossly non-focal.  Some inconsistent, suggestible, rhythmic movements of the RLE. Psychiatric: Alert and oriented, thought content appropriate, normal insight. Flat affect. Capillary Refill: Brisk,3 seconds, normal   Peripheral Pulses: +2 palpable, equal bilaterally       Labs:   Recent Labs     06/20/21  0406 06/22/21  0516   WBC 15.9* 15.9*   HGB 8.4* 7.6*   HCT 24.0* 21.8*    347     Recent Labs     06/20/21  0406 06/22/21  0516    129*   K 3.7 3.9    98*   CO2 23 24   BUN 6* 7   CREATININE 0.9 0.6*   CALCIUM 9.1 8.5     Recent Labs     06/20/21  0406   AST 26   ALT 19   BILITOT 3.2*   ALKPHOS 81     Recent Labs     06/21/21  0956   INR 1.54*     No results for input(s): Rik Seed in the last 72 hours. Urinalysis:      Lab Results   Component Value Date    NITRU Negative 06/09/2021    WBCUA None seen 06/09/2021    BACTERIA Rare 06/09/2021    RBCUA 0-2 06/09/2021    BLOODU TRACE-INTACT 06/09/2021    SPECGRAV 1.010 06/09/2021    GLUCOSEU Negative 06/09/2021       Radiology:  IR PICC WO SQ PORT/PUMP > 5 YEARS   Final Result   Successful placement of a right upper extremity PICC line. PICC tip at the cavoatrial junction. XR KNEE RIGHT (1-2 VIEWS)   Final Result   No significant finding of the right knee.                  Assessment/Plan:    Active Hospital Problems    Diagnosis     Sickle cell pain crisis ([facility] [address] [D57.00]     Asthma [J45.909]        \"Patient with PMH of sickle cell followed by Dr. Xiao, recently admitted to [facility] on 6/10 and discharged on 6/14 for sickle cell pain crisis presents to the ED today with complaints of pain all over, specially worse in the knees. Pt has been admitted already 4 times since May for sickle cell pain crisis. This will be his fifth. Patient reports 10/10 pain in both his knees and legs, took 20 mg of oxycodone just prior to arrival without much relief in pain. Patient was prescribed 60 tablets of oxycodone 10 mg each when he was discharged here on 6/14. Patient reports that he ran out of it yesterday. He has been taking 20 mg every 4 hours at home. \"      Sickle cell pain crisis. IVFs, hydroxyurea, analgesia. Appreciate hematology input.  - hydromorphone PCA  - knee Xray reassuring. Sickle cell anemia. Monitor Hb. His BMP was hyponatremic and hyperglycemic this AM.  I suspect the sample was skewed by his IVFs. F/u repeat. Asthma. Inhaled bronchodilators. DVT Prophylaxis: enoxaparin  Diet: ADULT DIET; Regular  Code Status: Full Code    PT/OT Eval Status: not indicated    Dispo - when his pain is better controlled. Will evaluate for discharge each day. He lives at home.       Susan Ugarte MD

## 2021-06-22 NOTE — ACP (ADVANCE CARE PLANNING)
Advance Care Planning     General Advance Care Planning (ACP) Conversation    Date of Conversation: 6/20/2021  Conducted with: Patient with Decision Making Capacity    Healthcare Decision Maker:      Primary Decision Maker: Nikolay Linda - Parent, Legal Guardian - [phone]    Secondary Decision Maker: Katy Hussein - Parent - [phone]      Today we documented Decision Maker(s) consistent with Legal Next of Kin hierarchy.     Content/Action Overview:  DECLINED ACP Conversation - will revisit periodically  Reviewed DNR/DNI and patient elects Full Code (Attempt Resuscitation)      Length of Voluntary ACP Conversation in minutes:  <16 minutes (Non-Billable)    YANN Fortune

## 2021-06-22 NOTE — PROGRESS NOTES
Pt assessment completed and charted. VSS. Pt a/o x4. Pt calm and cooperative. Pt denies pain at this time. Pt currently on PCA pump at 0.4 mg. Medications given per MAR. Pt uses urinal at bedside. PICC line infusing, getting brisk blood return. Bed in lowest position and wheels locked. Call light within reach. Bedside table within reach. Non-skid footwear in place. Pt denies any other needs at this time. Pt calls out appropriately. Will continue to monitor.

## 2021-06-22 NOTE — PLAN OF CARE
Problem: Falls - Risk of:  Goal: Will remain free from falls  Description: Will remain free from falls  Outcome: Ongoing     Problem: Pain:  Goal: Pain level will decrease  Description: Pain level will decrease  Outcome: Ongoing     Problem: Infection - Central Venous Catheter-Associated Bloodstream Infection:  Goal: Will show no infection signs and symptoms  Description: Will show no infection signs and symptoms  Outcome: Ongoing

## 2021-06-22 NOTE — PROGRESS NOTES
06/22/21 1151   Oxygen Therapy/Pulse Ox   O2 Therapy Oxygen   O2 Device Nasal cannula   O2 Flow Rate (L/min) 2 L/min   Resp 15   SpO2 98 %   End Tidal CO2 44

## 2021-06-22 NOTE — CARE COORDINATION
Writer was successful in complete a minimal assessment in room, needing much encouragement to participate in interview. CASE MANAGEMENT INITIAL ASSESSMENT    Reviewed chart and completed assessment with: Patient   Explained Case Management role/services. Primary contact information: Santiago Rodriguez Decision Maker :   Primary Decision Maker: Luisito Pérez - Parent, Legal Guardian - [phone]    Secondary Decision Maker: Patricia Alexis - Parent - [phone]          Can this person be reached and be able to respond quickly, such as within a few minutes or hours? Yes      Admit date/status:06/20/2021  Diagnosis:Sickle cell pain crisis  Is this a Readmission?:  Yes, multiple ( 6 inpatient and 6 ED since Jan 2021) most recent 060/-06/14 same dx      Insurance:[employer] and care sc   Precert required for SNF: Yes       3 night stay required: No    Living arrangements, Adls, care needs, prior to admission: Patient from home lives with parents, independant prior to admission     [address] at home:  Denies    Services in the home and/or outpatient, prior to admission: Denies     PT/OT recs: not ordered     Hospital Exemption Notification (HEN): NA    Barriers to discharge: Need pain management Writer hieu reached to to [facility]  to send list of in network providers    Plan/comments: Patient plans to dc back to home with parents. YANN Garnica       ECO on chart for MD signature

## 2021-06-22 NOTE — PROGRESS NOTES
ONCOLOGY HEMATOLOGY CARE PROGRESS NOTE      SUBJECTIVE:     The patient is resting quite comfortably this morning. When I wake him up, he states he is in significant pain. This is still with his right knee. ROS:     Constitutional:  No weight loss, No fever, No chills, No night sweats. Energy level good. Eyes:  No impairment or change in vision  ENT / Mouth:  No pain, abnormal ulceration, bleeding, nasal drip or change in voice or hearing  Cardiovascular:  No chest pain, palpitations, new edema, or calf discomfort  Respiratory:  No pain, hemoptysis, change to breathing  Breast:  No pain, discharge, change in appearance or texture  Gastrointestinal:  No pain, cramping, jaundice, change to eating and bowel habits  Urinary:  No pain, bleeding or change in continence  Genitalia: No pain, bleeding or discharge  Musculoskeletal:  See above. Skin:  No pruritus, rash, change to nodules or lesions  Neurologic:  No discomfort, change in mental status, speech, sensory or motor activity  Psychiatric:  No change in concentration or change to affect or mood  Endocrine:  No hot flashes, increased thirst, or change to urine production  Hematologic: No petechiae, ecchymosis or bleeding  Lymphatic:  No lymphadenopathy or lymphedema  Allergy / Immunologic:  No eczema, hives, frequent or recurrent infections    OBJECTIVE        Physical    VITALS:  BP (!) 105/57   Pulse 71   Temp 98.4 °F (36.9 °C) (Oral)   Resp 18   Ht 5' 8\" (1.727 m)   Wt 185 lb (83.9 kg)   SpO2 99%   BMI 28.13 kg/m²   TEMPERATURE:  Current - Temp: 98.4 °F (36.9 °C);  Max - Temp  Av.5 °F (36.9 °C)  Min: 98.1 °F (36.7 °C)  Max: 98.9 °F (37.2 °C)  PULSE OXIMETRY RANGE: SpO2  Av.5 %  Min: 92 %  Max: 99 %  24HR INTAKE/OUTPUT:      Intake/Output Summary (Last 24 hours) at 2021 0921  Last data filed at 2021 7173  Gross per 24 hour   Intake 7959.56 ml   Output 2275 ml   Net 5684.56 ml CONSTITUTIONAL: awake, alert, cooperative, no apparent distress   EYES: pupils equal, round and reactive to light, sclera clear and conjunctiva normal  ENT: Normocephalic, without obvious abnormality, atraumatic  NECK: supple, symmetrical, no jugular venous distension and no carotid bruits   HEMATOLOGIC/LYMPHATIC: no cervical, supraclavicular or axillary lymphadenopathy   LUNGS: no increased work of breathing and clear to auscultation   CARDIOVASCULAR: regular rate and rhythm, normal S1 and S2, no murmur noted  ABDOMEN: normal bowel sounds x 4, soft, non-distended, non-tender, no masses palpated, no hepatosplenomgaly   MUSCULOSKELETAL: full range of motion noted, tone is normal. The right knee has full range of motion. It is minimally swollen. It is not particularly warm. NEUROLOGIC: awake, alert, oriented to name, place and time. Motor skills grossly intact. SKIN: Normal skin color, texture, turgor and no jaundice.  appears intact   EXTREMITIES: no LE edema       Data      Recent Labs     06/20/21  0406 06/22/21  0516   WBC 15.9* 15.9*   HGB 8.4* 7.6*   HCT 24.0* 21.8*    347   .6* 99.7        Recent Labs     06/20/21  0406 06/22/21  0516    129*   K 3.7 3.9    98*   CO2 23 24   BUN 6* 7   CREATININE 0.9 0.6*     Recent Labs     06/20/21  0406   AST 26   ALT 19   BILITOT 3.2*   ALKPHOS 81       Magnesium:    Lab Results   Component Value Date    MG 1.80 05/05/2021    MG 1.80 05/04/2021    MG 1.90 01/12/2021         Problem List  Patient Active Problem List   Diagnosis    [facility] crisis [facility] [address]    Asthma    Sickle [facility] crisis ([facility] [address]    Sepsis ([facility] [address]    Opiate overdose ([facility] [address]    Opiate antagonist poisoning, accidental or unintentional, initial encounter    Leukocytosis    Hypoxia    Anemia    Other chest pain    Pneumonia due to infectious organism    Fever    Chronic prescription opiate use       ASSESSMENT AND PLAN:    Sickle cell crisis:  -Hemoglobin electrophoresis will be checked to see if his hemoglobin F is increased  -If his hemoglobin electrophoresis does not demonstrate any crease in hemoglobin F, his Hydrea will need to be adjusted  -I do worry about compliance  -He will be given 2 packed red blood cells today due to ongoing crisis    Pain:  -I doubled his PCA  -Given one-time bolus of Dilaudid  -X-ray of the right knee was ordered    Compliance:  -He missed his last dose of crizanlizumab  -He was supposed to bring his parents in for a conference, because he states his mother is taking his medication and rationing it. He states she will not give it to him when he is in pain.  -He denies that she is selling it or using it.  -I reiterated to him today that he is 60 days to find a pain specialist.  I believe this is quite generous, since we are only required to give him 30 days notice.   -I tried to call his family/mother at least 4 times yesterday with no one answering on the phone.  -I am extremely worried about compliance, manipulation, and other psychosocial dynamics.           ONCOLOGIC DISPOSITION:  -When his pain is controlled    Gely Gutierrez MD  Please contact through [facility]

## 2021-06-23 VITALS
WEIGHT: 185 LBS | SYSTOLIC BLOOD PRESSURE: 107 MMHG | OXYGEN SATURATION: 96 % | DIASTOLIC BLOOD PRESSURE: 64 MMHG | TEMPERATURE: 98 F | HEIGHT: 68 IN | BODY MASS INDEX: 28.04 KG/M2 | RESPIRATION RATE: 17 BRPM | HEART RATE: 85 BPM

## 2021-06-23 LAB
ANION GAP SERPL CALCULATED.3IONS-SCNC: 6 MMOL/L (ref 3–16)
BLOOD BANK DISPENSE STATUS: NORMAL
BLOOD BANK DISPENSE STATUS: NORMAL
BLOOD BANK PRODUCT CODE: NORMAL
BLOOD BANK PRODUCT CODE: NORMAL
BPU ID: NORMAL
BPU ID: NORMAL
BUN BLDV-MCNC: 8 MG/DL (ref 7–20)
CALCIUM SERPL-MCNC: 8.3 MG/DL (ref 8.3–10.6)
CHLORIDE BLD-SCNC: 101 MMOL/L (ref 99–110)
CO2: 27 MMOL/L (ref 21–32)
CREAT SERPL-MCNC: 0.6 MG/DL (ref 0.9–1.3)
DESCRIPTION BLOOD BANK: NORMAL
DESCRIPTION BLOOD BANK: NORMAL
GFR AFRICAN AMERICAN: >60
GFR NON-AFRICAN AMERICAN: >60
GLUCOSE BLD-MCNC: 335 MG/DL (ref 70–99)
HCT VFR BLD CALC: 24.6 % (ref 40.5–52.5)
HEMOGLOBIN: 8.6 G/DL (ref 13.5–17.5)
MCH RBC QN AUTO: 33.2 PG (ref 26–34)
MCHC RBC AUTO-ENTMCNC: 35 G/DL (ref 31–36)
MCV RBC AUTO: 94.7 FL (ref 80–100)
PDW BLD-RTO: 22.1 % (ref 12.4–15.4)
PLATELET # BLD: 323 K/UL (ref 135–450)
PMV BLD AUTO: 8 FL (ref 5–10.5)
POTASSIUM REFLEX MAGNESIUM: 4.3 MMOL/L (ref 3.5–5.1)
RBC # BLD: 2.6 M/UL (ref 4.2–5.9)
SODIUM BLD-SCNC: 134 MMOL/L (ref 136–145)
WBC # BLD: 13.7 K/UL (ref 4–11)

## 2021-06-23 PROCEDURE — 2580000003 HC RX 258: Performed by: INTERNAL MEDICINE

## 2021-06-23 PROCEDURE — 94761 N-INVAS EAR/PLS OXIMETRY MLT: CPT

## 2021-06-23 PROCEDURE — 85027 COMPLETE CBC AUTOMATED: CPT

## 2021-06-23 PROCEDURE — 6370000000 HC RX 637 (ALT 250 FOR IP): Performed by: INTERNAL MEDICINE

## 2021-06-23 PROCEDURE — 6360000002 HC RX W HCPCS: Performed by: INTERNAL MEDICINE

## 2021-06-23 PROCEDURE — 80048 BASIC METABOLIC PNL TOTAL CA: CPT

## 2021-06-23 PROCEDURE — 2700000000 HC OXYGEN THERAPY PER DAY

## 2021-06-23 RX ADMIN — DOCUSATE SODIUM 100 MG: 100 CAPSULE, LIQUID FILLED ORAL at 09:38

## 2021-06-23 RX ADMIN — DEXTROSE AND SODIUM CHLORIDE: 5; 450 INJECTION, SOLUTION INTRAVENOUS at 01:33

## 2021-06-23 RX ADMIN — HYDROXYUREA 1000 MG: 500 CAPSULE ORAL at 09:49

## 2021-06-23 RX ADMIN — Medication 10 ML: at 09:43

## 2021-06-23 RX ADMIN — Medication: at 10:19

## 2021-06-23 RX ADMIN — Medication: at 02:02

## 2021-06-23 RX ADMIN — KETOROLAC TROMETHAMINE 30 MG: 30 INJECTION, SOLUTION INTRAMUSCULAR; INTRAVENOUS at 09:39

## 2021-06-23 RX ADMIN — DEXTROSE AND SODIUM CHLORIDE: 5; 450 INJECTION, SOLUTION INTRAVENOUS at 09:45

## 2021-06-23 ASSESSMENT — PAIN SCALES - GENERAL
PAINLEVEL_OUTOF10: 8
PAINLEVEL_OUTOF10: 9
PAINLEVEL_OUTOF10: 8
PAINLEVEL_OUTOF10: 8

## 2021-06-23 ASSESSMENT — PAIN DESCRIPTION - ORIENTATION: ORIENTATION: RIGHT

## 2021-06-23 ASSESSMENT — PAIN DESCRIPTION - LOCATION: LOCATION: KNEE

## 2021-06-23 ASSESSMENT — PAIN DESCRIPTION - PAIN TYPE: TYPE: CHRONIC PAIN

## 2021-06-23 NOTE — PROGRESS NOTES
ONCOLOGY HEMATOLOGY CARE PROGRESS NOTE      SUBJECTIVE:     The patient states he feels his pain is better and he may be able to go home this afternoon. ROS:     Constitutional:  No weight loss, No fever, No chills, No night sweats. Energy level good. Eyes:  No impairment or change in vision  ENT / Mouth:  No pain, abnormal ulceration, bleeding, nasal drip or change in voice or hearing  Cardiovascular:  No chest pain, palpitations, new edema, or calf discomfort  Respiratory:  No pain, hemoptysis, change to breathing  Breast:  No pain, discharge, change in appearance or texture  Gastrointestinal:  No pain, cramping, jaundice, change to eating and bowel habits  Urinary:  No pain, bleeding or change in continence  Genitalia: No pain, bleeding or discharge  Musculoskeletal:  See above. Skin:  No pruritus, rash, change to nodules or lesions  Neurologic:  No discomfort, change in mental status, speech, sensory or motor activity  Psychiatric:  No change in concentration or change to affect or mood  Endocrine:  No hot flashes, increased thirst, or change to urine production  Hematologic: No petechiae, ecchymosis or bleeding  Lymphatic:  No lymphadenopathy or lymphedema  Allergy / Immunologic:  No eczema, hives, frequent or recurrent infections    OBJECTIVE        Physical    VITALS:  BP (!) 116/57   Pulse 62   Temp 97.8 °F (36.6 °C) (Oral)   Resp 16   Ht 5' 8\" (1.727 m)   Wt 185 lb (83.9 kg)   SpO2 100%   BMI 28.13 kg/m²   TEMPERATURE:  Current - Temp: 97.8 °F (36.6 °C);  Max - Temp  Av.4 °F (36.9 °C)  Min: 97.6 °F (36.4 °C)  Max: 99.5 °F (37.5 °C)  PULSE OXIMETRY RANGE: SpO2  Av %  Min: 97 %  Max: 100 %  24HR INTAKE/OUTPUT:      Intake/Output Summary (Last 24 hours) at 2021 1343  Last data filed at 2021 1230  Gross per 24 hour   Intake 5726.01 ml   Output 1750 ml   Net 3976.01 ml       CONSTITUTIONAL: awake, alert, cooperative, no apparent distress EYES: pupils equal, round and reactive to light, sclera clear and conjunctiva normal  ENT: Normocephalic, without obvious abnormality, atraumatic  NECK: supple, symmetrical, no jugular venous distension and no carotid bruits   HEMATOLOGIC/LYMPHATIC: no cervical, supraclavicular or axillary lymphadenopathy   LUNGS: no increased work of breathing and clear to auscultation   CARDIOVASCULAR: regular rate and rhythm, normal S1 and S2, no murmur noted  ABDOMEN: normal bowel sounds x 4, soft, non-distended, non-tender, no masses palpated, no hepatosplenomgaly   MUSCULOSKELETAL: full range of motion noted, tone is normal. The right knee has full range of motion. NEUROLOGIC: awake, alert, oriented to name, place and time. Motor skills grossly intact. SKIN: Normal skin color, texture, turgor and no jaundice. appears intact   EXTREMITIES: no LE edema       Data      Recent Labs     06/22/21  0516 06/23/21  0535   WBC 15.9* 13.7*   HGB 7.6* 8.6*   HCT 21.8* 24.6*    323   MCV 99.7 94.7        Recent Labs     06/22/21  0516 06/22/21  0918 06/23/21  0535   * 131* 134*   K 3.9 4.4 4.3   CL 98* 99 101   CO2 24 27 27   BUN 7 8 8   CREATININE 0.6* 0.7* 0.6*     No results for input(s): AST, ALT, ALB, BILIDIR, BILITOT, ALKPHOS in the last 72 hours.     Magnesium:    Lab Results   Component Value Date    MG 1.80 05/05/2021    MG 1.80 05/04/2021    MG 1.90 01/12/2021         Problem List  Patient Active Problem List   Diagnosis    [facility] [address]    Asthma    Sick[facility] cell crisis ([facility] [address]    Sepsis ([facility] [address]    Opiate overdose ([facility] [address]    Opiate antagonist poisoning, accidental or unintentional, initial encounter    Leukocytosis    Hypoxia    Anemia    Other chest pain    Pneumonia due to infectious organism    Fever    Chronic prescription opiate use       ASSESSMENT AND PLAN:    Sickle cell crisis:  -Hemoglobin electrophoresis will be checked to see if his hemoglobin F is increased  -If his hemoglobin

## 2021-06-23 NOTE — PROGRESS NOTES
Hospitalist Progress Note      PCP: Art Martinez MD    Date of Admission: 6/20/2021    Chief Complaint:  Bilateral knee pain      Subjective:  No new c/o. Medications:  Reviewed    Infusion Medications    sodium chloride      dextrose 5 % and 0.45 % NaCl 125 mL/hr at 06/23/21 0800    HYDROmorphone      sodium chloride       Scheduled Medications    hydroxyurea  1,000 mg Oral BID    docusate sodium  100 mg Oral Daily    enoxaparin  40 mg Subcutaneous Daily    ketorolac  30 mg Intravenous TID    lidocaine 1 % injection  5 mL Intradermal Once    sodium chloride flush  5-40 mL Intravenous 2 times per day     PRN Meds: sodium chloride, cyclobenzaprine, levalbuterol, ondansetron **OR** ondansetron, polyethylene glycol, acetaminophen **OR** acetaminophen, naloxone, sodium chloride flush, sodium chloride, budesonide-formoterol      Intake/Output Summary (Last 24 hours) at 6/23/2021 0949  Last data filed at 6/23/2021 0940  Gross per 24 hour   Intake 4980.05 ml   Output 850 ml   Net 4130.05 ml       Physical Exam Performed:    BP (!) 111/57   Pulse 107   Temp 99.5 °F (37.5 °C) (Oral)   Resp 22   Ht 5' 8\" (1.727 m)   Wt 185 lb (83.9 kg)   SpO2 98%   BMI 28.13 kg/m²     General appearance: No apparent distress, appears stated age. HEENT: Pupils equal, round, and reactive to light. Conjunctivae/corneas clear. Neck: Supple, with full range of motion. No jugular venous distention. Trachea midline. Respiratory:  Normal respiratory effort. Clear to auscultation, bilaterally without Rales/Wheezes/Rhonchi. Cardiovascular: Regular rate and rhythm with normal S1/S2 without murmurs, rubs or gallops. Abdomen: Soft, non-tender, non-distended with normal bowel sounds. Musculoskeletal: No clubbing, cyanosis or edema bilaterally. Full range of motion without deformity. Skin: Skin color, texture, turgor normal.  No rashes or lesions.   Neurologic:  Neurovascularly intact without any focal prior to arrival without much relief in pain. Patient was prescribed 60 tablets of oxycodone 10 mg each when he was discharged here on 6/14. Patient reports that he ran out of it yesterday. He has been taking 20 mg every 4 hours at home. \"    Sickle cell pain crisis. IVFs, hydroxyurea, analgesia. Appreciate hematology input. On Hydromorphone PCA. Knee Xrays reassuring. Sickle cell anemia. Will continue to follow serial labs. Reviewed and documented as above. Asthma - controlled on home inhaled bronchodilators - continued. Heddy  DVT Prophylaxis: LMWH  Diet: ADULT DIET; Regular  Code Status: Full Code      PT/OT Eval Status: not indicated    Dispo - when his pain is better controlled. Will evaluate for discharge each day. He lives at home.       Beryle Chandler, MD

## 2021-06-23 NOTE — PROGRESS NOTES
Patient assessment complete and documented. VSS. A&O x4. Patient is currently receiving his first dose of PRBC. Patient showing no reaction signs. Patient currently using dilaudid PCA pump and getting scheduled toradol (see MAR). Patient stated pain is at an 8 at this time. Bed is in the lowest locked position with call light within reach.  Will continue to monitor situation

## 2021-06-23 NOTE — PROGRESS NOTES
picc line removed. Vasaline guauze and dry dressing placed. Pt tolerated with no complications. Cindy Gonzales RN

## 2021-06-23 NOTE — CARE COORDINATION
Chart review day 3: Patient on C3 re sickle cell pain crisis followed by Hematology/Oncology and IM. Patient ct with pain pump and 02. Writer provided copy of in network provider list that can possible assist with finding pain specialist. Patient report eagerness to return home. YANN Knight   CASE MANAGEMENT DISCHARGE SUMMARY    Discharge to: Home     Precertification completed: [facility] Exemption Notification (HENS) completed: NA    IMM given: (date) NA    New Durable Medical Equipment ordered/agency: NA    Transportation: Via private car     Confirmed discharge plan with: Patient     RN, name: Kale Velez RN     Note: Discharging nurse to complete DANIS, reconcile AVS, and place final copy with patient's discharge packet. RN to ensure that written prescriptions for  Level II medications are sent with patient to the facility as per protocol.   YANN Knight

## 2021-06-24 NOTE — DISCHARGE SUMMARY
Hospital Medicine Discharge Summary    Patient ID: [facility]      Patient's PCP: Art Martinez MD    Admit Date: 6/20/2021     Discharge Date: 6/23/2021      Admitting Physician: Art Martinez MD     Discharge Physician: Goldy Sanchez MD     Discharge Diagnoses: Active Hospital Problems    Diagnosis     Sickle cell pain crisis ([address] [D57.00]     Asthma [J45.909]        The patient was seen and examined on day of discharge and this discharge summary is in conjunction with any daily progress note from day of discharge. Hospital Course:          \"Patient with PMH of sickle cell followed by Dr. Xiao, recently admitted to [facility] on 6/10 and discharged on 6/14 for sickle cell pain crisis presents to the ED today with complaints of pain all over, specially worse in the knees. Pt has been admitted already 4 times since May for sickle cell pain crisis.  This will be his fifth.  Patient reports 10/10 pain in both his knees and legs, took 20 mg of oxycodone just prior to arrival without much relief in pain.  Patient was prescribed 60 tablets of oxycodone 10 mg each when he was discharged here on 6/14.  Patient reports that he ran out of it yesterday.  He has been taking 20 mg every 4 hours at home. \"     Sickle cell pain crisis - IVFs, hydroxyurea, analgesia. Appreciate hematology input. On Hydromorphone PCA. Knee Xrays reassuring.     Sickle cell anemia - followed and transfused per Heme/Onc.     Asthma - controlled on home inhaled bronchodilators - continued. .      Labs:  For convenience and continuity at follow-up the following most recent labs are provided:      CBC:    Lab Results   Component Value Date    WBC 13.7 06/23/2021    HGB 8.6 06/23/2021    HCT 24.6 06/23/2021     06/23/2021       Renal:    Lab Results   Component Value Date     06/23/2021    K 4.3 06/23/2021     06/23/2021    CO2 27 06/23/2021    BUN 8 06/23/2021    CREATININE 0.6 06/23/2021    CALCIUM 8.3 06/23/2021         Significant Diagnostic Studies    Radiology:   IR PICC WO SQ PORT/PUMP > 5 YEARS   Final Result   Successful placement of a right upper extremity PICC line. PICC tip at the cavoatrial junction. XR KNEE RIGHT (1-2 VIEWS)   Final Result   No significant finding of the right knee. Consults:     IP CONSULT TO ONCOLOGY    Disposition: Home    Condition at Discharge: Stable    Discharge Instructions/Follow-up:  w/ PCP 1-2 weeks and subspecialists as arranged. Code Status:  Full Code    Activity: activity as tolerated    Diet: regular diet      Discharge Medications:     Discharge Medication List as of 6/23/2021  4:13 PM           Details   oxyCODONE HCl (OXY-IR) 10 MG immediate release tablet Take 10 mg by mouth every 4 hours as needed for Pain. Historical Med      albuterol sulfate HFA (PROAIR HFA) 108 (90 Base) MCG/ACT inhaler Albuterol HFA Inhaler 90 mcg/actuation  inhaled  2 puffs prn     ActiveHistorical Med      mometasone-formoterol (DULERA) 100-5 MCG/ACT inhaler Mometasone-Formoterol HFA Inhaler 100 mcg-5 mcg/actuation  inhaled  2 puffs every am     ActiveHistorical Med      levalbuterol (XOPENEX) 0.31 MG/3ML nebulization Levalbuterol Nebulized    2 puffs prn     ActiveHistorical Med      lactulose (CHRONULAC) 10 GM/15ML solution Take 15 mLs by mouthHistorical Med      docusate (COLACE, DULCOLAX) 100 MG CAPS Take 100 mg by mouthHistorical Med      ibuprofen (ADVIL;MOTRIN) 800 MG tablet ibuprofen 800 MG Oral Tablet  oral   prn    ActiveHistorical Med      oxyCODONE (OXYCONTIN) 20 MG extended release tablet TAKE 1 TABLET BY MOUTH EVERY 12 HOURS. TAKE WITH ENOUGH WATER TO SWALLOW WHOLE. DO NOT CRUSH/DISSOLVE/CHEW/CUT/BREAK. Historical Med      hydroxyurea (HYDREA) 500 MG chemo capsule Take 2 capsules by mouth 2 times dailyHistorical Med             Time Spent on discharge is more than 30 minutes in the examination, evaluation, counseling and review of medications and discharge plan. Signed:    Nahomi Sesay MD   6/24/2021      Thank you Jasmin Gonzalez MD for the opportunity to be involved in this patient's care. If you have any questions or concerns please feel free to contact me at [phone].

## 2021-06-29 LAB
HEMOGLOBIN ELECTROPHORESIS: NORMAL
HGB ELECTROPHORESIS INTERP: NORMAL

## 2021-07-24 ENCOUNTER — APPOINTMENT (OUTPATIENT)
Dept: GENERAL RADIOLOGY | Age: 21
DRG: 812 | End: 2021-07-24
Payer: COMMERCIAL

## 2021-07-24 ENCOUNTER — HOSPITAL ENCOUNTER (INPATIENT)
Age: 21
LOS: 5 days | Discharge: HOME | DRG: 812 | End: 2021-07-30
Attending: EMERGENCY MEDICINE | Admitting: INTERNAL MEDICINE
Payer: COMMERCIAL

## 2021-07-24 DIAGNOSIS — D57.00 SICKLE CELL PAIN CRISIS (HCC): Primary | ICD-10-CM

## 2021-07-24 DIAGNOSIS — D57.00 SICKLE CELL DISEASE WITH CRISIS (HCC): ICD-10-CM

## 2021-07-24 LAB
A/G RATIO: 1.2 (ref 1.1–2.2)
ABO/RH: NORMAL
ALBUMIN SERPL-MCNC: 4.4 G/DL (ref 3.4–5)
ALP BLD-CCNC: 110 U/L (ref 40–129)
ALT SERPL-CCNC: 58 U/L (ref 10–40)
ANION GAP SERPL CALCULATED.3IONS-SCNC: 12 MMOL/L (ref 3–16)
ANTIBODY SCREEN: NORMAL
AST SERPL-CCNC: 76 U/L (ref 15–37)
BASOPHILS ABSOLUTE: 0.2 K/UL (ref 0–0.2)
BASOPHILS RELATIVE PERCENT: 1.2 %
BILIRUB SERPL-MCNC: 5.3 MG/DL (ref 0–1)
BLOOD BANK DISPENSE STATUS: NORMAL
BLOOD BANK DISPENSE STATUS: NORMAL
BLOOD BANK PRODUCT CODE: NORMAL
BLOOD BANK PRODUCT CODE: NORMAL
BPU ID: NORMAL
BPU ID: NORMAL
BUN BLDV-MCNC: 10 MG/DL (ref 7–20)
CALCIUM SERPL-MCNC: 9.8 MG/DL (ref 8.3–10.6)
CHLORIDE BLD-SCNC: 102 MMOL/L (ref 99–110)
CO2: 24 MMOL/L (ref 21–32)
CREAT SERPL-MCNC: 0.8 MG/DL (ref 0.9–1.3)
DESCRIPTION BLOOD BANK: NORMAL
DESCRIPTION BLOOD BANK: NORMAL
EOSINOPHILS ABSOLUTE: 0.1 K/UL (ref 0–0.6)
EOSINOPHILS RELATIVE PERCENT: 0.4 %
GFR AFRICAN AMERICAN: >60
GFR NON-AFRICAN AMERICAN: >60
GLOBULIN: 3.7 G/DL
GLUCOSE BLD-MCNC: 99 MG/DL (ref 70–99)
HCT VFR BLD CALC: 20 % (ref 40.5–52.5)
HEMATOLOGY PATH CONSULT: NO
HEMOGLOBIN: 7.1 G/DL (ref 13.5–17.5)
IMMATURE RETIC FRACT: 0.67 (ref 0.21–0.37)
LYMPHOCYTES ABSOLUTE: 3.5 K/UL (ref 1–5.1)
LYMPHOCYTES RELATIVE PERCENT: 18.5 %
MCH RBC QN AUTO: 34.1 PG (ref 26–34)
MCHC RBC AUTO-ENTMCNC: 35.6 G/DL (ref 31–36)
MCV RBC AUTO: 95.8 FL (ref 80–100)
MONOCYTES ABSOLUTE: 2.1 K/UL (ref 0–1.3)
MONOCYTES RELATIVE PERCENT: 11.2 %
NEUTROPHILS ABSOLUTE: 12.8 K/UL (ref 1.7–7.7)
NEUTROPHILS RELATIVE PERCENT: 68.7 %
PDW BLD-RTO: 22.6 % (ref 12.4–15.4)
PLATELET # BLD: 329 K/UL (ref 135–450)
PMV BLD AUTO: 8.6 FL (ref 5–10.5)
POTASSIUM REFLEX MAGNESIUM: 4.1 MMOL/L (ref 3.5–5.1)
RBC # BLD: 2.09 M/UL (ref 4.2–5.9)
RETICULOCYTE ABSOLUTE COUNT: 0.28 M/UL
RETICULOCYTE COUNT PCT: 13.34 % (ref 0.5–2.18)
SODIUM BLD-SCNC: 138 MMOL/L (ref 136–145)
TOTAL PROTEIN: 8.1 G/DL (ref 6.4–8.2)
WBC # BLD: 18.7 K/UL (ref 4–11)

## 2021-07-24 PROCEDURE — 80053 COMPREHEN METABOLIC PANEL: CPT

## 2021-07-24 PROCEDURE — 85045 AUTOMATED RETICULOCYTE COUNT: CPT

## 2021-07-24 PROCEDURE — G0480 DRUG TEST DEF 1-7 CLASSES: HCPCS

## 2021-07-24 PROCEDURE — 2580000003 HC RX 258: Performed by: INTERNAL MEDICINE

## 2021-07-24 PROCEDURE — 6360000002 HC RX W HCPCS: Performed by: EMERGENCY MEDICINE

## 2021-07-24 PROCEDURE — 2580000003 HC RX 258: Performed by: EMERGENCY MEDICINE

## 2021-07-24 PROCEDURE — 6370000000 HC RX 637 (ALT 250 FOR IP): Performed by: INTERNAL MEDICINE

## 2021-07-24 PROCEDURE — 96372 THER/PROPH/DIAG INJ SC/IM: CPT

## 2021-07-24 PROCEDURE — 99285 EMERGENCY DEPT VISIT HI MDM: CPT

## 2021-07-24 PROCEDURE — 36430 TRANSFUSION BLD/BLD COMPNT: CPT

## 2021-07-24 PROCEDURE — 96376 TX/PRO/DX INJ SAME DRUG ADON: CPT

## 2021-07-24 PROCEDURE — G0378 HOSPITAL OBSERVATION PER HR: HCPCS

## 2021-07-24 PROCEDURE — 96375 TX/PRO/DX INJ NEW DRUG ADDON: CPT

## 2021-07-24 PROCEDURE — 86850 RBC ANTIBODY SCREEN: CPT

## 2021-07-24 PROCEDURE — 80307 DRUG TEST PRSMV CHEM ANLYZR: CPT

## 2021-07-24 PROCEDURE — 96374 THER/PROPH/DIAG INJ IV PUSH: CPT

## 2021-07-24 PROCEDURE — 86901 BLOOD TYPING SEROLOGIC RH(D): CPT

## 2021-07-24 PROCEDURE — 86902 BLOOD TYPE ANTIGEN DONOR EA: CPT

## 2021-07-24 PROCEDURE — 71045 X-RAY EXAM CHEST 1 VIEW: CPT

## 2021-07-24 PROCEDURE — 96361 HYDRATE IV INFUSION ADD-ON: CPT

## 2021-07-24 PROCEDURE — 85660 RBC SICKLE CELL TEST: CPT

## 2021-07-24 PROCEDURE — 85025 COMPLETE CBC W/AUTO DIFF WBC: CPT

## 2021-07-24 PROCEDURE — P9016 RBC LEUKOCYTES REDUCED: HCPCS

## 2021-07-24 PROCEDURE — 36415 COLL VENOUS BLD VENIPUNCTURE: CPT

## 2021-07-24 PROCEDURE — 86900 BLOOD TYPING SEROLOGIC ABO: CPT

## 2021-07-24 PROCEDURE — 86923 COMPATIBILITY TEST ELECTRIC: CPT

## 2021-07-24 PROCEDURE — 6360000002 HC RX W HCPCS: Performed by: INTERNAL MEDICINE

## 2021-07-24 RX ORDER — ACETAMINOPHEN 650 MG/1
650 SUPPOSITORY RECTAL EVERY 6 HOURS PRN
Status: DISCONTINUED | OUTPATIENT
Start: 2021-07-24 | End: 2021-07-30 | Stop reason: HOSPADM

## 2021-07-24 RX ORDER — ACETAMINOPHEN 325 MG/1
650 TABLET ORAL EVERY 6 HOURS PRN
Status: DISCONTINUED | OUTPATIENT
Start: 2021-07-24 | End: 2021-07-30 | Stop reason: HOSPADM

## 2021-07-24 RX ORDER — 0.9 % SODIUM CHLORIDE 0.9 %
1000 INTRAVENOUS SOLUTION INTRAVENOUS ONCE
Status: COMPLETED | OUTPATIENT
Start: 2021-07-24 | End: 2021-07-24

## 2021-07-24 RX ORDER — DIPHENHYDRAMINE HYDROCHLORIDE 50 MG/ML
25 INJECTION INTRAMUSCULAR; INTRAVENOUS ONCE
Status: COMPLETED | OUTPATIENT
Start: 2021-07-24 | End: 2021-07-24

## 2021-07-24 RX ORDER — OXYCODONE HCL 20 MG/1
20 TABLET, FILM COATED, EXTENDED RELEASE ORAL EVERY 12 HOURS SCHEDULED
Status: DISCONTINUED | OUTPATIENT
Start: 2021-07-24 | End: 2021-07-30 | Stop reason: HOSPADM

## 2021-07-24 RX ORDER — HYDROMORPHONE HCL 110MG/55ML
2 PATIENT CONTROLLED ANALGESIA SYRINGE INTRAVENOUS EVERY 4 HOURS PRN
Status: DISCONTINUED | OUTPATIENT
Start: 2021-07-24 | End: 2021-07-26

## 2021-07-24 RX ORDER — SODIUM CHLORIDE 9 MG/ML
25 INJECTION, SOLUTION INTRAVENOUS PRN
Status: DISCONTINUED | OUTPATIENT
Start: 2021-07-24 | End: 2021-07-30 | Stop reason: HOSPADM

## 2021-07-24 RX ORDER — KETOROLAC TROMETHAMINE 30 MG/ML
30 INJECTION, SOLUTION INTRAMUSCULAR; INTRAVENOUS EVERY 8 HOURS
Status: DISCONTINUED | OUTPATIENT
Start: 2021-07-24 | End: 2021-07-26

## 2021-07-24 RX ORDER — LACTULOSE 10 G/15ML
15 SOLUTION ORAL DAILY PRN
Status: CANCELLED | OUTPATIENT
Start: 2021-07-24

## 2021-07-24 RX ORDER — ONDANSETRON 4 MG/1
4 TABLET, ORALLY DISINTEGRATING ORAL EVERY 8 HOURS PRN
Status: DISCONTINUED | OUTPATIENT
Start: 2021-07-24 | End: 2021-07-30 | Stop reason: HOSPADM

## 2021-07-24 RX ORDER — SODIUM CHLORIDE 0.9 % (FLUSH) 0.9 %
5-40 SYRINGE (ML) INJECTION PRN
Status: DISCONTINUED | OUTPATIENT
Start: 2021-07-24 | End: 2021-07-30 | Stop reason: HOSPADM

## 2021-07-24 RX ORDER — BUDESONIDE AND FORMOTEROL FUMARATE DIHYDRATE 80; 4.5 UG/1; UG/1
2 AEROSOL RESPIRATORY (INHALATION) 2 TIMES DAILY
Status: DISCONTINUED | OUTPATIENT
Start: 2021-07-24 | End: 2021-07-25

## 2021-07-24 RX ORDER — ONDANSETRON 2 MG/ML
4 INJECTION INTRAMUSCULAR; INTRAVENOUS EVERY 6 HOURS PRN
Status: DISCONTINUED | OUTPATIENT
Start: 2021-07-24 | End: 2021-07-30 | Stop reason: HOSPADM

## 2021-07-24 RX ORDER — SODIUM CHLORIDE 9 MG/ML
INJECTION, SOLUTION INTRAVENOUS CONTINUOUS
Status: ACTIVE | OUTPATIENT
Start: 2021-07-24 | End: 2021-07-25

## 2021-07-24 RX ORDER — POLYETHYLENE GLYCOL 3350 17 G/17G
17 POWDER, FOR SOLUTION ORAL DAILY PRN
Status: DISCONTINUED | OUTPATIENT
Start: 2021-07-24 | End: 2021-07-30 | Stop reason: HOSPADM

## 2021-07-24 RX ORDER — KETOROLAC TROMETHAMINE 30 MG/ML
15 INJECTION, SOLUTION INTRAMUSCULAR; INTRAVENOUS EVERY 8 HOURS
Status: CANCELLED | OUTPATIENT
Start: 2021-07-24 | End: 2021-07-29

## 2021-07-24 RX ORDER — HYDROXYUREA 500 MG/1
1000 CAPSULE ORAL 2 TIMES DAILY
Status: DISCONTINUED | OUTPATIENT
Start: 2021-07-24 | End: 2021-07-30 | Stop reason: HOSPADM

## 2021-07-24 RX ORDER — PSEUDOEPHEDRINE HCL 30 MG
100 TABLET ORAL DAILY
Status: CANCELLED | OUTPATIENT
Start: 2021-07-24

## 2021-07-24 RX ORDER — SODIUM CHLORIDE 9 MG/ML
INJECTION, SOLUTION INTRAVENOUS PRN
Status: DISCONTINUED | OUTPATIENT
Start: 2021-07-24 | End: 2021-07-30 | Stop reason: HOSPADM

## 2021-07-24 RX ORDER — SODIUM CHLORIDE 0.9 % (FLUSH) 0.9 %
5-40 SYRINGE (ML) INJECTION EVERY 12 HOURS SCHEDULED
Status: DISCONTINUED | OUTPATIENT
Start: 2021-07-24 | End: 2021-07-30 | Stop reason: HOSPADM

## 2021-07-24 RX ORDER — ONDANSETRON 2 MG/ML
4 INJECTION INTRAMUSCULAR; INTRAVENOUS ONCE
Status: COMPLETED | OUTPATIENT
Start: 2021-07-24 | End: 2021-07-24

## 2021-07-24 RX ORDER — OXYCODONE HCL 20 MG/1
20 TABLET, FILM COATED, EXTENDED RELEASE ORAL EVERY 12 HOURS SCHEDULED
Status: CANCELLED | OUTPATIENT
Start: 2021-07-24

## 2021-07-24 RX ORDER — OXYCODONE HYDROCHLORIDE 5 MG/1
10 TABLET ORAL EVERY 4 HOURS PRN
Status: DISCONTINUED | OUTPATIENT
Start: 2021-07-24 | End: 2021-07-26

## 2021-07-24 RX ORDER — OXYCODONE HYDROCHLORIDE 5 MG/1
10 TABLET ORAL EVERY 4 HOURS PRN
Status: CANCELLED | OUTPATIENT
Start: 2021-07-24

## 2021-07-24 RX ORDER — ALBUTEROL SULFATE 90 UG/1
2 AEROSOL, METERED RESPIRATORY (INHALATION) EVERY 6 HOURS PRN
Status: DISCONTINUED | OUTPATIENT
Start: 2021-07-24 | End: 2021-07-30 | Stop reason: HOSPADM

## 2021-07-24 RX ORDER — HYDROXYUREA 500 MG/1
1000 CAPSULE ORAL 2 TIMES DAILY
Status: CANCELLED | OUTPATIENT
Start: 2021-07-24

## 2021-07-24 RX ORDER — HYDROMORPHONE HCL 110MG/55ML
2 PATIENT CONTROLLED ANALGESIA SYRINGE INTRAVENOUS ONCE
Status: COMPLETED | OUTPATIENT
Start: 2021-07-24 | End: 2021-07-24

## 2021-07-24 RX ORDER — IBUPROFEN 800 MG/1
800 TABLET ORAL EVERY 8 HOURS PRN
Status: CANCELLED | OUTPATIENT
Start: 2021-07-24

## 2021-07-24 RX ORDER — ALBUTEROL SULFATE 90 UG/1
2 AEROSOL, METERED RESPIRATORY (INHALATION) EVERY 6 HOURS PRN
Status: CANCELLED | OUTPATIENT
Start: 2021-07-24

## 2021-07-24 RX ADMIN — SODIUM CHLORIDE, PRESERVATIVE FREE 10 ML: 5 INJECTION INTRAVENOUS at 09:47

## 2021-07-24 RX ADMIN — SODIUM CHLORIDE 1000 ML: 9 INJECTION, SOLUTION INTRAVENOUS at 04:54

## 2021-07-24 RX ADMIN — OXYCODONE 10 MG: 5 TABLET ORAL at 17:19

## 2021-07-24 RX ADMIN — HYDROXYUREA 1000 MG: 500 CAPSULE ORAL at 09:47

## 2021-07-24 RX ADMIN — HYDROMORPHONE HYDROCHLORIDE 2 MG: 2 INJECTION, SOLUTION INTRAMUSCULAR; INTRAVENOUS; SUBCUTANEOUS at 20:17

## 2021-07-24 RX ADMIN — ONDANSETRON 4 MG: 2 INJECTION INTRAMUSCULAR; INTRAVENOUS at 04:55

## 2021-07-24 RX ADMIN — HYDROMORPHONE HYDROCHLORIDE 2 MG: 2 INJECTION, SOLUTION INTRAMUSCULAR; INTRAVENOUS; SUBCUTANEOUS at 05:55

## 2021-07-24 RX ADMIN — HYDROMORPHONE HYDROCHLORIDE 1 MG: 1 INJECTION, SOLUTION INTRAMUSCULAR; INTRAVENOUS; SUBCUTANEOUS at 04:55

## 2021-07-24 RX ADMIN — SODIUM CHLORIDE: 9 INJECTION, SOLUTION INTRAVENOUS at 09:53

## 2021-07-24 RX ADMIN — OXYCODONE 10 MG: 5 TABLET ORAL at 13:01

## 2021-07-24 RX ADMIN — OXYCODONE HYDROCHLORIDE 20 MG: 20 TABLET, FILM COATED, EXTENDED RELEASE ORAL at 09:47

## 2021-07-24 RX ADMIN — KETOROLAC TROMETHAMINE 30 MG: 30 INJECTION, SOLUTION INTRAMUSCULAR; INTRAVENOUS at 09:47

## 2021-07-24 RX ADMIN — OXYCODONE HYDROCHLORIDE 20 MG: 20 TABLET, FILM COATED, EXTENDED RELEASE ORAL at 20:13

## 2021-07-24 RX ADMIN — HYDROXYUREA 1000 MG: 500 CAPSULE ORAL at 20:14

## 2021-07-24 RX ADMIN — HYDROMORPHONE HYDROCHLORIDE 2 MG: 2 INJECTION, SOLUTION INTRAMUSCULAR; INTRAVENOUS; SUBCUTANEOUS at 11:13

## 2021-07-24 RX ADMIN — DIPHENHYDRAMINE HYDROCHLORIDE 25 MG: 50 INJECTION, SOLUTION INTRAMUSCULAR; INTRAVENOUS at 04:55

## 2021-07-24 RX ADMIN — HYDROMORPHONE HYDROCHLORIDE 2 MG: 2 INJECTION, SOLUTION INTRAMUSCULAR; INTRAVENOUS; SUBCUTANEOUS at 15:50

## 2021-07-24 RX ADMIN — ENOXAPARIN SODIUM 40 MG: 40 INJECTION SUBCUTANEOUS at 09:47

## 2021-07-24 RX ADMIN — KETOROLAC TROMETHAMINE 30 MG: 30 INJECTION, SOLUTION INTRAMUSCULAR; INTRAVENOUS at 17:16

## 2021-07-24 RX ADMIN — OXYCODONE 10 MG: 5 TABLET ORAL at 21:46

## 2021-07-24 ASSESSMENT — PAIN DESCRIPTION - ORIENTATION
ORIENTATION: RIGHT;LOWER;UPPER
ORIENTATION: RIGHT

## 2021-07-24 ASSESSMENT — PAIN SCALES - GENERAL
PAINLEVEL_OUTOF10: 9
PAINLEVEL_OUTOF10: 8
PAINLEVEL_OUTOF10: 8
PAINLEVEL_OUTOF10: 10
PAINLEVEL_OUTOF10: 7
PAINLEVEL_OUTOF10: 7
PAINLEVEL_OUTOF10: 9
PAINLEVEL_OUTOF10: 8
PAINLEVEL_OUTOF10: 9
PAINLEVEL_OUTOF10: 8

## 2021-07-24 ASSESSMENT — PAIN DESCRIPTION - LOCATION
LOCATION: ARM
LOCATION: ARM

## 2021-07-24 ASSESSMENT — PAIN DESCRIPTION - PAIN TYPE: TYPE: ACUTE PAIN;CHRONIC PAIN

## 2021-07-24 NOTE — PROGRESS NOTES
Pt admitted to unit from ER. A/O x 4. Assessment completed and charted. Respirations even and unlabored. Complaining of right arm and shoulder pain 9/10. Awaiting blood bank to call regarding PRBC. Bed in lowest position and locked. Call light in reach.

## 2021-07-24 NOTE — H&P
Tablet  oral   prn    Active   Yes Historical Provider, MD   oxyCODONE (OXYCONTIN) 20 MG extended release tablet TAKE 1 TABLET BY MOUTH EVERY 12 HOURS. TAKE WITH ENOUGH WATER TO SWALLOW WHOLE. DO NOT CRUSH/DISSOLVE/CHEW/CUT/BREAK. 4/12/21  Yes Historical Provider, MD   hydroxyurea (HYDREA) 500 MG chemo capsule Take 2 capsules by mouth 2 times daily 12/9/20  Yes Chago Orta MD       Allergies:  Morphine    Social History:      The patient currently lives at home    TOBACCO:   reports that he has never smoked. He has never used smokeless tobacco.  ETOH:   reports previous alcohol use. E-Cigarettes/Vaping Use     Questions Responses    E-Cigarette/Vaping Use Never User    Start Date     Passive Exposure     Quit Date     Counseling Given     Comments             Family History:       Reviewed in detail and negative for DM, CAD, Cancer, CVA. Positive as follows:    History reviewed. No pertinent family history. REVIEW OF SYSTEMS COMPLETED:   Pertinent positives as noted in the HPI. All other systems reviewed and negative. PHYSICAL EXAM PERFORMED:    BP (!) 97/56   Pulse 76   Temp 98 °F (36.7 °C) (Oral)   Resp 17   Ht 5' 8\" (1.727 m)   Wt 185 lb (83.9 kg)   SpO2 94%   BMI 28.13 kg/m²     General appearance:  No apparent distress, appears stated age and cooperative. Resting w/o issues  HEENT:  Normal cephalic, atraumatic without obvious deformity. Pupils equal, round, and reactive to light. Extra ocular muscles intact. Conjunctivae/corneas clear. Neck: Supple, with full range of motion. No jugular venous distention. Trachea midline. Respiratory:  Normal respiratory effort. Clear to auscultation, bilaterally without Rales/Wheezes/Rhonchi. Cardiovascular:  Regular rate and rhythm with normal S1/S2 without murmurs, rubs or gallops. Abdomen: Soft, non-tender, non-distended with normal bowel sounds. Musculoskeletal:  No clubbing, cyanosis or edema bilaterally.   Full range of motion without deformity. Skin: Skin color, texture, turgor normal.  No rashes or lesions. Neurologic:  Neurovascularly intact without any focal sensory/motor deficits. Cranial nerves: II-XII intact, grossly non-focal.  Psychiatric:  Alert and oriented, thought content appropriate, normal insight  Capillary Refill: Brisk,3 seconds, normal  Peripheral Pulses: +2 palpable, equal bilaterally       Labs:     Recent Labs     07/24/21 0439   WBC 18.7*   HGB 7.1*   HCT 20.0*        Recent Labs     07/24/21 0439      K 4.1      CO2 24   BUN 10   CREATININE 0.8*   CALCIUM 9.8     Recent Labs     07/24/21 0439   AST 76*   ALT 58*   BILITOT 5.3*   ALKPHOS 110     No results for input(s): INR in the last 72 hours. No results for input(s): [place] in the last 72 hours. Urinalysis:      Lab Results   Component Value Date    NITRU Negative 06/09/2021    WBCUA None seen 06/09/2021    BACTERIA Rare 06/09/2021    RBCUA 0-2 06/09/2021    BLOODU TRACE-INTACT 06/09/2021    SPECGRAV 1.010 06/09/2021    GLUCOSEU Negative 06/09/2021       Radiology:     EKG:  I have reviewed the EKG with the following interpretation: na    XR CHEST PORTABLE   Final Result   No acute process. ASSESSMENT:    Active Hospital Problems    Diagnosis Date Noted    Sickle cell crisis ([facility] [address] [D57.00] 07/28/2020         PLAN:  Rigiht arm/shoulder pain- with concern for Sickle cell pain crisis. - per emr, this will be his 6th presentation since beginning of May 2021.    -ordered 2u pRBCs on 7/24/21.   -hemonc consulted and managing pain  -continued ivfs, hydroxyurea  -previously pt was sent on oxycodone 10 mg prescription through the [facility] EMR.   Continued home chronic pain meds   -there have been concerns for developing addiction behavior/misuse of opiods in the recent past, hemonc aware and recommended that pt arrange f/u with pain mgmt  -given scheduled toradol 30mg q8h for 9 doses    Sickle cell anemia - followed and transfused

## 2021-07-24 NOTE — PLAN OF CARE
Problem: Pain:  Goal: Pain level will decrease  Description: Pain level will decrease  Outcome: Ongoing  Pt A/O x4. Calls out appropriately. Pain is managed by scheduled and PRN pain regimen. Offered non pharmacological interventions for pain control.

## 2021-07-24 NOTE — ED PROVIDER NOTES
[facility] Emergency Department      CHIEF COMPLAINT  Sickle Cell Pain Crisis (Patient arrives via ems with c/o R Arm pain. Patient hx sickle cell. Started this AM. 0100 10mg oxy)      HISTORY OF PRESENT ILLNESS  Kate Smith is a 24 y.o. male with a history of sickle cell anemia presents with pain in his right arm. He states it has been ongoing since about 1:00 this morning. He has been taking his home OxyContin and oxycodone without relief. He sees Dr. Gerhardt Louis with hematology. He denies fevers. He has had a little bit of left-sided rib pain but no shortness of breath or cough. No anterior chest pain. He does take hydroxyurea and folic acid at home as well. .   No other complaints, modifying factors or associated symptoms. I have reviewed the following from the nursing documentation. 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 No pertinent family history.   Social History     Socioeconomic History    Marital status: Single     Spouse name: Not on file    Number of children: 0    Years of education: Not on file    Highest education level: Not on file   Occupational History    Not on file   Tobacco Use    Smoking status: Never Smoker    Smokeless tobacco: Never Used   Vaping Use    Vaping Use: Never used   Substance and Sexual Activity    Alcohol use: Not Currently    Drug use: Never    Sexual activity: Not Currently   Other Topics Concern    Not on file   Social History Narrative    Not on file     Social Determinants of Health     Financial Resource Strain:     Difficulty of Paying Living Expenses:    Food Insecurity:     Worried About Running Out of Food in the Last Year:     [address] in the Last Year:    Transportation Needs:     Lack of Transportation (Medical):  Lack of Transportation (Non-Medical):    Physical Activity:     Days of Exercise per Week:     Minutes of Exercise per Session:    Stress:     Feeling of Stress :    Social Connections:     Frequency of Communication with Friends and Family:     Frequency of Social Gatherings with Friends and Family:     Attends [employer] Services:     Active Member of Clubs or Organizations:     Attends Club or Organization Meetings:     Marital Status:    Intimate Partner Violence:     Fear of Current or Ex-Partner:     Emotionally Abused:     Physically Abused:     Sexually Abused:      No current facility-administered medications for this encounter. Current Outpatient Medications   Medication Sig Dispense Refill    oxyCODONE HCl (OXY-IR) 10 MG immediate release tablet Take 10 mg by mouth every 4 hours as needed for Pain.  albuterol sulfate HFA (PROAIR HFA) 108 (90 Base) MCG/ACT inhaler Albuterol HFA Inhaler 90 mcg/actuation  inhaled  2 puffs prn     Active      mometasone-formoterol (DULERA) 100-5 MCG/ACT inhaler Mometasone-Formoterol HFA Inhaler 100 mcg-5 mcg/actuation  inhaled  2 puffs every am     Active      levalbuterol (XOPENEX) 0.31 MG/3ML nebulization Levalbuterol Nebulized    2 puffs prn     Active      lactulose (CHRONULAC) 10 GM/15ML solution Take 15 mLs by mouth      docusate (COLACE, DULCOLAX) 100 MG CAPS Take 100 mg by mouth      oxyCODONE (OXYCONTIN) 20 MG extended release tablet TAKE 1 TABLET BY MOUTH EVERY 12 HOURS. TAKE WITH ENOUGH WATER TO SWALLOW WHOLE. DO NOT CRUSH/DISSOLVE/CHEW/CUT/BREAK.       hydroxyurea (HYDREA) 500 MG chemo capsule Take 2 capsules by mouth 2 times daily      ibuprofen (ADVIL;MOTRIN) 800 MG tablet ibuprofen 800 MG Oral Tablet  oral   prn    Active       Allergies   Allergen Reactions    Morphine Shortness Of Breath     Other reaction(s): Histamine-like Reaction  Has asthma exacerbation with morphine-histamine type reaction REVIEW OF SYSTEMS  10 systems reviewed, pertinent positives per HPI otherwise noted to be negative. PHYSICAL EXAM  /68   Pulse 94   Temp 98.2 °F (36.8 °C) (Oral)   Resp 22   Ht 5' 8\" (1.727 m)   Wt 185 lb (83.9 kg)   SpO2 100%   BMI 28.13 kg/m²   GENERAL APPEARANCE: Awake and alert. Cooperative. Tearful, speaking in a whisper. HEAD: Normocephalic. Atraumatic. EYES: PERRL. EOM's grossly intact. ENT: Mucous membranes are moist.   NECK: Supple, trachea midline. HEART: RRR. LUNGS: Respirations unlabored. CTAB. Good air exchange. No wheezes, rales, or rhonchi. Speaking comfortably in full sentences. ABDOMEN: Soft. Non-distended. Non-tender. No guarding or rebound. EXTREMITIES: No peripheral edema. MAEE. No acute deformities. Tender right arm diffusely everywhere I touch. No edema or erythema noted. The right arm is neurovascularly intact. No joint swelling. SKIN: Warm, dry and intact. No acute rashes. NEUROLOGICAL: Alert and oriented X 3. CN II-XII grossly intact. Strength 5/5, sensation intact. PSYCHIATRIC: Normal mood and affect. LABS  I have reviewed all labs for this visit.    Results for orders placed or performed during the hospital encounter of 07/24/21   CBC Auto Differential   Result Value Ref Range    WBC 18.7 (H) 4.0 - 11.0 K/uL    RBC 2.09 (L) 4.20 - 5.90 M/uL    Hemoglobin 7.1 (L) 13.5 - 17.5 g/dL    Hematocrit 20.0 (LL) 40.5 - 52.5 %    MCV 95.8 80.0 - 100.0 fL    MCH 34.1 (H) 26.0 - 34.0 pg    MCHC 35.6 31.0 - 36.0 g/dL    RDW 22.6 (H) 12.4 - 15.4 %    Platelets 820 498 - 860 K/uL    MPV 8.6 5.0 - 10.5 fL    Path Consult No     Neutrophils % 68.7 %    Lymphocytes % 18.5 %    Monocytes % 11.2 %    Eosinophils % 0.4 %    Basophils % 1.2 %    Neutrophils Absolute 12.8 (H) 1.7 - 7.7 K/uL    Lymphocytes Absolute 3.5 1.0 - 5.1 K/uL    Monocytes Absolute 2.1 (H) 0.0 - 1.3 K/uL    Eosinophils Absolute 0.1 0.0 - 0.6 K/uL    Basophils Absolute 0.2 0.0 - 0.2 K/uL Comprehensive Metabolic Panel w/ Reflex to MG   Result Value Ref Range    Sodium 138 136 - 145 mmol/L    Potassium reflex Magnesium 4.1 3.5 - 5.1 mmol/L    Chloride 102 99 - 110 mmol/L    CO2 24 21 - 32 mmol/L    Anion Gap 12 3 - 16    Glucose 99 70 - 99 mg/dL    BUN 10 7 - 20 mg/dL    CREATININE 0.8 (L) 0.9 - 1.3 mg/dL    GFR Non-African American >60 >60    GFR African American >60 >60    Calcium 9.8 8.3 - 10.6 mg/dL    Total Protein 8.1 6.4 - 8.2 g/dL    Albumin 4.4 3.4 - 5.0 g/dL    Albumin/Globulin Ratio 1.2 1.1 - 2.2    Total Bilirubin 5.3 (H) 0.0 - 1.0 mg/dL    Alkaline Phosphatase 110 40 - 129 U/L    ALT 58 (H) 10 - 40 U/L    AST 76 (H) 15 - 37 U/L    Globulin 3.7 g/dL   Reticulocytes   Result Value Ref Range    Retic Ct Pct 13.34 (H) 0.50 - 2.18 %    Retic Ct Abs 0.278 M/uL    Immature Retic Fract 0.67 (H) 0.21 - 0.37           Cardiac Monitoring: The cardiac monitor revealed normal sinus rhythm as interpreted by me. The cardiac monitor was ordered secondary to the patient's complaint of sickle cell crisis and to monitor the patient for dysrhythmia. RADIOLOGY  X-RAYS:  I have reviewed radiologic plain film image(s). ALL OTHER NON-PLAIN FILM IMAGES SUCH AS CT, ULTRASOUND AND MRI HAVE BEEN READ BY THE RADIOLOGIST. XR CHEST PORTABLE   Final Result   No acute process. Rechecks: Physical assessment performed. After the first dose of Dilaudid and Toradol the patient had no relief in his pain. Second dose of Dilaudid at increased strength given. He still has had no relief in his pain. He is requesting admission. Critical Care:I personally spent a total of 50 minutes of critical care time in obtaining history, performing a physical exam, bedside monitoring of interventions, collecting and interpreting tests and discussion with consultants but excluding time spent performing procedures, treating other patients and teaching time. ED COURSE/MDM  Patient seen and evaluated. Here the patient is afebrile with normal vitals signs. Old records reviewed. He is nontoxic-appearing. Lungs are clear. No sign of joint swelling or erythema in his right arm. That is where he is complaining of pain. Labs and imaging reviewed and results discussed with patient. Hemoglobin is 7.1 which is slightly below baseline for him. Reticulocyte count is up compared to prior. Chest x-ray is unremarkable. He has been given 2 rounds of IV Dilaudid in addition to Toradol and IV fluids. He has had no relief in his pain and is requesting admission. I did speak with Dr. Modesto Smith who is his primary hematologist.  There is concerned that this patient is malingering and drug-seeking. He is supposed to be finding a pain management specialist as an outpatient. Dr. Modesto Smith does recommend we go ahead and transfuse 2 units of packed red cells. I will admit to the hospitalist.  Patient was reassessed as noted above . Plan of care discussed with patient. Patient in agreement with plan. CLINICAL IMPRESSION  1. Sickle cell pain crisis ([facility] [address]    2. Sickle cell disease with crisis ([facility] [address]        Blood pressure 119/68, pulse 94, temperature 98.2 °F (36.8 °C), temperature source Oral, resp. rate 22, height 5' 8\" (1.727 m), weight 185 lb (83.9 kg), SpO2 100 %. 12 [facility] was admitted in stable condition.     (Please note this note was completed with a voice recognition program.  Efforts were made to edit the dictations but occasionally words are mis-transcribed.)       Haseeb Aldana MD  07/24/21 3003

## 2021-07-24 NOTE — ED NOTES
Pt states he is still in 10/10 pain even after being medicated. Notified Dr. Radha Barnes.      Valentina Tipton RN  07/24/21 7118

## 2021-07-24 NOTE — CONSULTS
[facility]    Consult Note      Requesting Physician:  Dr. Darshan Murphy:  Sickle cell crisis        HISTORY OF PRESENT ILLNESS:      Mr. Dajuan Sanford  is a 24 y.o. male we are seeing in consultation for A sickle cell crisis. He comes to the hospital frequently. He is not reliable with his medication and we have to administer them weekly. He has had one accidental overdose that his medications have been decreased. He can be manipulative with respect to trying to get more medication, but is always pleasant and polite. The patient complains of pain in the right arm. He states this started several days ago. His parents state that he sits in his room and plays video games and is doing quite well until they ask him to do something and then he states he has too much pain. If they pressed too much, he goes to the hospital.  It is very difficult to read how much pain he is having as noted per above. The patient has been told that he needs to see a pain specialist and that I will no longer write his pain medication. I gave him a 2-month window to get this done and it has not . Patient states that he saw pain specialist, but when he went to the office they canceled his appointment because he did not have a referral.  The office did not contact us for referral and the patient never mentioned this after he scheduled the appointment.       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)     Sickle cell anemia (HCC)      Past Surgical History:    Past Surgical History:   Procedure Laterality Date    SPLENECTOMY         Current Medications:    Current Facility-Administered Medications   Medication Dose Route Frequency Provider Last Rate Last Admin    0.9 % sodium chloride infusion   Intravenous PRN Sarahi Doe MD        sodium chloride flush 0.9 % injection 5-40 mL  5-40 mL Intravenous 2 times per day Barak Vargas MD   10 mL at 07/24/21 0947    sodium chloride flush 0.9 % injection 5-40 mL  5-40 mL Intravenous PRN Barak Vargas MD        0.9 % sodium chloride infusion  25 mL Intravenous PRN Barak Vargas MD        enoxaparin (LOVENOX) injection 40 mg  40 mg Subcutaneous Daily Barak Vargsa MD   40 mg at 07/24/21 0947    ondansetron (ZOFRAN-ODT) disintegrating tablet 4 mg  4 mg Oral Q8H PRN Barak Vargas MD        Or    [facility]ron [facility] injection 4 mg  4 mg Intravenous Q6H PRN Barak Vargas MD        polyethylene glycol [facility]) packet 17 g  17 g Oral Daily PRN Barak Vargas MD        acetaminophen (TYLENOL) tablet 650 mg  650 mg Oral Q6H PRN Barak Vargas MD        Or   [place] acetaminophen (TYLENOL) suppository 650 mg  650 mg Rectal Q6H PRN Barak Vargas MD        ketorolac (TORADOL) injection 30 mg  30 mg Intravenous Q8H Barak Vargas MD   30 mg at 07/24/21 0947    0.9 % sodium chloride infusion   Intravenous Continuous Barak Vargas MD 75 mL/hr at 07/24/21 0953 New Bag at 07/24/21 0953    hydroxyurea (HYDREA) chemo capsule 1,000 mg  1,000 mg Oral BID Barak Vargas MD   1,000 mg at 07/24/21 0947    budesonide-formoterol (SYMBICORT) 80-4.5 MCG/ACT inhaler 2 puff  2 puff Inhalation BID Barak Vargas MD        oxyCODONE (OXYCONTIN) extended release tablet 20 mg  20 mg Oral 2 times per day Barak Vargas MD   20 mg at 07/24/21 0947    oxyCODONE (ROXICODONE) immediate release tablet 10 mg  10 mg Oral Q4H PRN Barak Vargas MD        albuterol sulfate  (90 Base) MCG/ACT inhaler 2 puff  2 puff Inhalation Q6H PRN Barak Vargas MD        HYDROmorphone (DILAUDID) injection 2 mg  2 mg Intravenous Q4H PRN Licha Quiroz MD         Allergies:     Allergies   Allergen Reactions    Morphine Shortness Of Breath     Other reaction(s): Histamine-like Reaction  Has asthma exacerbation with morphine-histamine type reaction         Social History:   Social History     Socioeconomic History    Marital status: Single     Spouse name: Not on file    Number of children: 0    Years of education: Not on file    Highest education level: Not on file   Occupational History    Not on file   Tobacco Use    Smoking status: Never Smoker    Smokeless tobacco: Never Used   Vaping Use    Vaping Use: Never used   Substance and Sexual Activity    Alcohol use: Not Currently    Drug use: Never    Sexual activity: Not Currently   Other Topics Concern    Not on file   Social History Narrative    Not on file     Social Determinants of Health     Financial Resource Strain:     Difficulty of Paying Living Expenses:    Food Insecurity:     Worried About Running Out of Food in the Last Year:     [address] in the Last Year:    Transportation Needs:     Lack of Transportation (Medical):  Lack of Transportation (Non-Medical):    Physical Activity:     Days of Exercise per Week:     Minutes of Exercise per Session:    Stress:     Feeling of Stress :    Social Connections:     Frequency of Communication with Friends and Family:     Frequency of Social Gatherings with Friends and Family:     Attends [employer] Services:     Active Member of Clubs or Organizations:     Attends Club or Organization Meetings:     Marital Status:    Intimate Partner Violence:     Fear of Current or Ex-Partner:     Emotionally Abused:     Physically Abused:     Sexually Abused:           Family History:     History reviewed. No pertinent family history. REVIEW OF SYSTEMS:      Constitutional:  No weight loss, No fever, No chills, No night sweats. Energy level good.   Eyes:  No impairment or change in vision  ENT / Mouth:  No pain, abnormal ulceration, bleeding, nasal drip or change in voice or hearing  Cardiovascular:  No chest pain, palpitations, new edema, or calf discomfort  Respiratory:  No pain, hemoptysis, change to breathing  Breast:  No pain, discharge, change in appearance or texture  Gastrointestinal:  No pain, cramping, jaundice, change to eating and bowel habits  Urinary:  No pain, bleeding or change in continence  Genitalia: No pain, bleeding or discharge  Musculoskeletal:  No redness, pain, edema or weakness  Skin:  No pruritus, rash, change to nodules or lesions  Neurologic:  No discomfort, change in mental status, speech, sensory or motor activity  Psychiatric:  No change in concentration or change to affect or mood  Endocrine:  No hot flashes, increased thirst, or change to urine production  Hematologic: No petechiae, ecchymosis or bleeding  Lymphatic:  No lymphadenopathy or lymphedema  Allergy / Immunologic:  No eczema, hives, frequent or recurrent infections    PHYSICAL EXAM:      Vitals:  /70   Pulse 69   Temp 98.4 °F (36.9 °C) (Oral)   Resp 16   Ht 5' 8\" (1.727 m)   Wt 185 lb (83.9 kg)   SpO2 95%   BMI 28.13 kg/m²   CONSTITUTIONAL: awake, alert, cooperative, no apparent distress   EYES: pupils equal, round and reactive to light, sclera clear and conjunctiva normal  ENT: Normocephalic, without obvious abnormality, atraumatic  NECK: supple, symmetrical, no jugular venous distension and no carotid bruits   HEMATOLOGIC/LYMPHATIC: no cervical, supraclavicular or axillary lymphadenopathy   LUNGS: no increased work of breathing and clear to auscultation   CARDIOVASCULAR: regular rate and rhythm, normal S1 and S2, no murmur noted  ABDOMEN: normal bowel sounds x 4, soft, non-distended, non-tender, no masses palpated, no hepatosplenomgaly   MUSCULOSKELETAL: full range of motion noted, tone is normal  NEUROLOGIC: awake, alert, oriented to name, place and time. Motor skills grossly intact. SKIN: Normal skin color, texture, turgor and no jaundice.  appears intact   EXTREMITIES: no LE no longer write his pain medication. The patient has 60 days to see a pain specialist from 6/29/2021. This is actually 30 more days than I am required to give him.  -I do not suspect that he is dealing or diverting his medication. I think he has very little motivation to help himself and this is the biggest problem.  -He has passed drug screens previously. Urine drug screen will be ordered for today. -He will also be given packed red blood cells and these were ordered in the emergency department.  -I did discuss his care with the emergency department physician and with the hospitalist.            Thank you for asking me to see the patient.        Licha Quiroz MD  Please Contact Through [employer]

## 2021-07-24 NOTE — PROGRESS NOTES
Secure message sent to Dr. Radha Ashby regarding patients wished to change pain medication frequency. Response: No change at this time.

## 2021-07-24 NOTE — ED NOTES
Offered pt warm blankets and a pillow. Pt declined. Per Dr. Uche Jones, pt okay to eat and drink. Provided pt with [employer] and crackers with peanut butter.      Dominique Tony RN  07/24/21 4532

## 2021-07-25 LAB
AMPHETAMINE SCREEN, URINE: ABNORMAL
ANION GAP SERPL CALCULATED.3IONS-SCNC: 10 MMOL/L (ref 3–16)
BARBITURATE SCREEN URINE: ABNORMAL
BASOPHILS ABSOLUTE: 0.1 K/UL (ref 0–0.2)
BASOPHILS RELATIVE PERCENT: 0.9 %
BENZODIAZEPINE SCREEN, URINE: ABNORMAL
BUN BLDV-MCNC: 8 MG/DL (ref 7–20)
CALCIUM SERPL-MCNC: 9.3 MG/DL (ref 8.3–10.6)
CANNABINOID SCREEN URINE: ABNORMAL
CHLORIDE BLD-SCNC: 107 MMOL/L (ref 99–110)
CO2: 24 MMOL/L (ref 21–32)
COCAINE METABOLITE SCREEN URINE: ABNORMAL
CREAT SERPL-MCNC: 0.7 MG/DL (ref 0.9–1.3)
EOSINOPHILS ABSOLUTE: 0.2 K/UL (ref 0–0.6)
EOSINOPHILS RELATIVE PERCENT: 1.6 %
GFR AFRICAN AMERICAN: >60
GFR NON-AFRICAN AMERICAN: >60
GLUCOSE BLD-MCNC: 82 MG/DL (ref 70–99)
HCT VFR BLD CALC: 25.3 % (ref 40.5–52.5)
HEMOGLOBIN: 8.9 G/DL (ref 13.5–17.5)
LYMPHOCYTES ABSOLUTE: 3.5 K/UL (ref 1–5.1)
LYMPHOCYTES RELATIVE PERCENT: 25.5 %
Lab: ABNORMAL
MCH RBC QN AUTO: 33.5 PG (ref 26–34)
MCHC RBC AUTO-ENTMCNC: 35.3 G/DL (ref 31–36)
MCV RBC AUTO: 94.9 FL (ref 80–100)
METHADONE SCREEN, URINE: ABNORMAL
MONOCYTES ABSOLUTE: 1.5 K/UL (ref 0–1.3)
MONOCYTES RELATIVE PERCENT: 11.2 %
NEUTROPHILS ABSOLUTE: 8.3 K/UL (ref 1.7–7.7)
NEUTROPHILS RELATIVE PERCENT: 60.8 %
OPIATE SCREEN URINE: POSITIVE
OXYCODONE URINE: POSITIVE
PDW BLD-RTO: 23.3 % (ref 12.4–15.4)
PH UA: 7
PHENCYCLIDINE SCREEN URINE: ABNORMAL
PLATELET # BLD: 303 K/UL (ref 135–450)
PMV BLD AUTO: 7.7 FL (ref 5–10.5)
POTASSIUM REFLEX MAGNESIUM: 4.8 MMOL/L (ref 3.5–5.1)
PROPOXYPHENE SCREEN: ABNORMAL
RBC # BLD: 2.66 M/UL (ref 4.2–5.9)
SODIUM BLD-SCNC: 141 MMOL/L (ref 136–145)
WBC # BLD: 13.6 K/UL (ref 4–11)

## 2021-07-25 PROCEDURE — 80048 BASIC METABOLIC PNL TOTAL CA: CPT

## 2021-07-25 PROCEDURE — 6370000000 HC RX 637 (ALT 250 FOR IP): Performed by: INTERNAL MEDICINE

## 2021-07-25 PROCEDURE — 1200000000 HC SEMI PRIVATE

## 2021-07-25 PROCEDURE — 96376 TX/PRO/DX INJ SAME DRUG ADON: CPT

## 2021-07-25 PROCEDURE — 36415 COLL VENOUS BLD VENIPUNCTURE: CPT

## 2021-07-25 PROCEDURE — 96361 HYDRATE IV INFUSION ADD-ON: CPT

## 2021-07-25 PROCEDURE — G0378 HOSPITAL OBSERVATION PER HR: HCPCS

## 2021-07-25 PROCEDURE — 85025 COMPLETE CBC W/AUTO DIFF WBC: CPT

## 2021-07-25 PROCEDURE — 6360000002 HC RX W HCPCS: Performed by: INTERNAL MEDICINE

## 2021-07-25 PROCEDURE — 80307 DRUG TEST PRSMV CHEM ANLYZR: CPT

## 2021-07-25 PROCEDURE — 2580000003 HC RX 258: Performed by: INTERNAL MEDICINE

## 2021-07-25 RX ORDER — HYDROMORPHONE HCL 110MG/55ML
2 PATIENT CONTROLLED ANALGESIA SYRINGE INTRAVENOUS ONCE
Status: COMPLETED | OUTPATIENT
Start: 2021-07-25 | End: 2021-07-25

## 2021-07-25 RX ORDER — BUDESONIDE AND FORMOTEROL FUMARATE DIHYDRATE 80; 4.5 UG/1; UG/1
2 AEROSOL RESPIRATORY (INHALATION) PRN
Status: DISCONTINUED | OUTPATIENT
Start: 2021-07-25 | End: 2021-07-30 | Stop reason: HOSPADM

## 2021-07-25 RX ADMIN — HYDROXYUREA 1000 MG: 500 CAPSULE ORAL at 21:03

## 2021-07-25 RX ADMIN — OXYCODONE 10 MG: 5 TABLET ORAL at 15:00

## 2021-07-25 RX ADMIN — SODIUM CHLORIDE, PRESERVATIVE FREE 10 ML: 5 INJECTION INTRAVENOUS at 08:15

## 2021-07-25 RX ADMIN — OXYCODONE HYDROCHLORIDE 20 MG: 20 TABLET, FILM COATED, EXTENDED RELEASE ORAL at 21:03

## 2021-07-25 RX ADMIN — SODIUM CHLORIDE, PRESERVATIVE FREE 10 ML: 5 INJECTION INTRAVENOUS at 00:11

## 2021-07-25 RX ADMIN — HYDROMORPHONE HYDROCHLORIDE 2 MG: 2 INJECTION, SOLUTION INTRAMUSCULAR; INTRAVENOUS; SUBCUTANEOUS at 16:40

## 2021-07-25 RX ADMIN — HYDROMORPHONE HYDROCHLORIDE 2 MG: 2 INJECTION, SOLUTION INTRAMUSCULAR; INTRAVENOUS; SUBCUTANEOUS at 20:06

## 2021-07-25 RX ADMIN — HYDROXYUREA 1000 MG: 500 CAPSULE ORAL at 08:12

## 2021-07-25 RX ADMIN — HYDROMORPHONE HYDROCHLORIDE 2 MG: 2 INJECTION, SOLUTION INTRAMUSCULAR; INTRAVENOUS; SUBCUTANEOUS at 12:16

## 2021-07-25 RX ADMIN — OXYCODONE 10 MG: 5 TABLET ORAL at 19:26

## 2021-07-25 RX ADMIN — HYDROMORPHONE HYDROCHLORIDE 2 MG: 2 INJECTION, SOLUTION INTRAMUSCULAR; INTRAVENOUS; SUBCUTANEOUS at 04:10

## 2021-07-25 RX ADMIN — OXYCODONE 10 MG: 5 TABLET ORAL at 02:51

## 2021-07-25 RX ADMIN — OXYCODONE 10 MG: 5 TABLET ORAL at 06:48

## 2021-07-25 RX ADMIN — KETOROLAC TROMETHAMINE 30 MG: 30 INJECTION, SOLUTION INTRAMUSCULAR; INTRAVENOUS at 17:59

## 2021-07-25 RX ADMIN — KETOROLAC TROMETHAMINE 30 MG: 30 INJECTION, SOLUTION INTRAMUSCULAR; INTRAVENOUS at 00:39

## 2021-07-25 RX ADMIN — OXYCODONE HYDROCHLORIDE 20 MG: 20 TABLET, FILM COATED, EXTENDED RELEASE ORAL at 08:12

## 2021-07-25 RX ADMIN — KETOROLAC TROMETHAMINE 30 MG: 30 INJECTION, SOLUTION INTRAMUSCULAR; INTRAVENOUS at 08:12

## 2021-07-25 RX ADMIN — OXYCODONE 10 MG: 5 TABLET ORAL at 23:40

## 2021-07-25 RX ADMIN — HYDROMORPHONE HYDROCHLORIDE 2 MG: 2 INJECTION, SOLUTION INTRAMUSCULAR; INTRAVENOUS; SUBCUTANEOUS at 00:11

## 2021-07-25 RX ADMIN — SODIUM CHLORIDE: 9 INJECTION, SOLUTION INTRAVENOUS at 06:46

## 2021-07-25 RX ADMIN — HYDROMORPHONE HYDROCHLORIDE 2 MG: 2 INJECTION, SOLUTION INTRAMUSCULAR; INTRAVENOUS; SUBCUTANEOUS at 21:17

## 2021-07-25 RX ADMIN — SODIUM CHLORIDE, PRESERVATIVE FREE 10 ML: 5 INJECTION INTRAVENOUS at 23:41

## 2021-07-25 RX ADMIN — OXYCODONE 10 MG: 5 TABLET ORAL at 10:35

## 2021-07-25 ASSESSMENT — PAIN SCALES - GENERAL
PAINLEVEL_OUTOF10: 8
PAINLEVEL_OUTOF10: 8
PAINLEVEL_OUTOF10: 10
PAINLEVEL_OUTOF10: 8
PAINLEVEL_OUTOF10: 7
PAINLEVEL_OUTOF10: 9
PAINLEVEL_OUTOF10: 9
PAINLEVEL_OUTOF10: 8
PAINLEVEL_OUTOF10: 7
PAINLEVEL_OUTOF10: 10
PAINLEVEL_OUTOF10: 8
PAINLEVEL_OUTOF10: 7
PAINLEVEL_OUTOF10: 9
PAINLEVEL_OUTOF10: 9
PAINLEVEL_OUTOF10: 10
PAINLEVEL_OUTOF10: 8
PAINLEVEL_OUTOF10: 8

## 2021-07-25 NOTE — PLAN OF CARE
Problem: Pain:  Goal: Control of acute pain  Description: Control of acute pain  Outcome: Ongoing    Note: A/O x4, makes needs known. In bed, quiet, awake. Has been up all shift and on cell phone for activity. Continues with acute pain. Medicated with PRN Dilaudid IV x3 this shift and PRN oxycodone x2. Medicated per Emar with scheduled Toradol and oxycontin. Pain rated on 0-10 scale with minimal improvement in pain.

## 2021-07-25 NOTE — PROGRESS NOTES
Patient in bed awake. A/O x 4. Assessment completed and charted. Respirations even and unlabored. Pain is being controlled by Prn and scheduled pain regimen. Stating 8/10 and 9/10 pain. Bed in lowest position and locked. Call light in reach.

## 2021-07-25 NOTE — PROGRESS NOTES
sounds. Musculoskeletal: No clubbing, cyanosis or edema bilaterally. Full range of motion without deformity. Skin: Skin color, texture, turgor normal.  No rashes or lesions. Neurologic:  Neurovascularly intact without any focal sensory/motor deficits. Cranial nerves: II-XII intact, grossly non-focal.  Psychiatric: Alert and oriented, thought content appropriate, normal insight  Capillary Refill: Brisk,3 seconds, normal   Peripheral Pulses: +2 palpable, equal bilaterally       Labs:   Recent Labs     07/24/21 0439 07/25/21 0616   WBC 18.7* 13.6*   HGB 7.1* 8.9*   HCT 20.0* 25.3*    303     Recent Labs     07/24/21 0439 07/25/21 0616    141   K 4.1 4.8    107   CO2 24 24   BUN 10 8   CREATININE 0.8* 0.7*   CALCIUM 9.8 9.3     Recent Labs     07/24/21 0439   AST 76*   ALT 58*   BILITOT 5.3*   ALKPHOS 110     No results for input(s): INR in the last 72 hours. No results for input(s): [place] in the last 72 hours. Urinalysis:      Lab Results   Component Value Date    NITRU Negative 06/09/2021    WBCUA None seen 06/09/2021    BACTERIA Rare 06/09/2021    RBCUA 0-2 06/09/2021    BLOODU TRACE-INTACT 06/09/2021    SPECGRAV 1.010 06/09/2021    GLUCOSEU Negative 06/09/2021       Radiology:  XR CHEST PORTABLE   Final Result   No acute process. Assessment/Plan:    Active Hospital Problems    Diagnosis     Sickle cell crisis ([facility] [address] [D57.00]          Rigiht arm/shoulder pain- with concern for Sickle cell pain crisis.   - per emr, this will be his 6th presentation since beginning of May 2021.    -ordered 2u pRBCs on 7/24/21.   -hemonc consulted and managing pain  -continued ivfs, hydroxyurea  -previously pt was sent on oxycodone 10 mg prescription through the [facility].  Continued home chronic pain meds   -there have been concerns for developing addiction behavior/misuse of opiods in the recent past, hemonc aware and recommended that pt arrange f/u with pain mgmt  -given scheduled toradol 30mg q8h for 9 doses     Sickle cell anemia - followed and transfused per Heme/Onc.   -2u prbc ordered     Asthma - controlled on home inhaled bronchodilators - continued. .     DVT Prophylaxis: lovenox  Diet: ADULT DIET;  Regular  Code Status: Full Code    PT/OT Eval Status: not needed    Dispo - pending improvement, per hemonc Jigar Jack MD

## 2021-07-25 NOTE — PROGRESS NOTES
ONCOLOGY HEMATOLOGY CARE PROGRESS NOTE      SUBJECTIVE:     Patient states that his pain is a little better. Yesterday evening, he did ask for an increase in his Dilaudid. ROS:     Constitutional:  No weight loss, No fever, No chills, No night sweats. Energy level good. Eyes:  No impairment or change in vision  ENT / Mouth:  No pain, abnormal ulceration, bleeding, nasal drip or change in voice or hearing  Cardiovascular:  No chest pain, palpitations, new edema, or calf discomfort  Respiratory:  No pain, hemoptysis, change to breathing  Breast:  No pain, discharge, change in appearance or texture  Gastrointestinal:  No pain, cramping, jaundice, change to eating and bowel habits  Urinary:  No pain, bleeding or change in continence  Genitalia: No pain, bleeding or discharge  Musculoskeletal:  No redness, pain, edema or weakness  Skin:  No pruritus, rash, change to nodules or lesions  Neurologic:  No discomfort, change in mental status, speech, sensory or motor activity  Psychiatric:  No change in concentration or change to affect or mood  Endocrine:  No hot flashes, increased thirst, or change to urine production  Hematologic: No petechiae, ecchymosis or bleeding  Lymphatic:  No lymphadenopathy or lymphedema  Allergy / Immunologic:  No eczema, hives, frequent or recurrent infections    OBJECTIVE        Physical    VITALS:  /66   Pulse 54   Temp 97.9 °F (36.6 °C) (Oral)   Resp 18   Ht 5' 8\" (1.727 m)   Wt 196 lb 12.8 oz (89.3 kg)   SpO2 96%   BMI 29.92 kg/m²   TEMPERATURE:  Current - Temp: 97.9 °F (36.6 °C);  Max - Temp  Av.2 °F (36.8 °C)  Min: 97.7 °F (36.5 °C)  Max: 98.7 °F (37.1 °C)  PULSE OXIMETRY RANGE: SpO2  Av.2 %  Min: 96 %  Max: 99 %  24HR INTAKE/OUTPUT:      Intake/Output Summary (Last 24 hours) at 2021 1327  Last data filed at 2021 1140  Gross per 24 hour   Intake 4682 ml   Output 1425 ml   Net 3257 ml       CONSTITUTIONAL: awake, alert, cooperative, no apparent distress   EYES: pupils equal, round and reactive to light, sclera clear and conjunctiva normal  ENT: Normocephalic, without obvious abnormality, atraumatic  NECK: supple, symmetrical, no jugular venous distension and no carotid bruits   HEMATOLOGIC/LYMPHATIC: no cervical, supraclavicular or axillary lymphadenopathy   LUNGS: no increased work of breathing and clear to auscultation   CARDIOVASCULAR: regular rate and rhythm, normal S1 and S2, no murmur noted  ABDOMEN: normal bowel sounds x 4, soft, non-distended, non-tender, no masses palpated, no hepatosplenomgaly   MUSCULOSKELETAL: full range of motion noted, tone is normal  NEUROLOGIC: awake, alert, oriented to name, place and time. Motor skills grossly intact. SKIN: Normal skin color, texture, turgor and no jaundice.  appears intact   EXTREMITIES: no LE edema       Data      Recent Labs     07/24/21 0439 07/25/21  0616   WBC 18.7* 13.6*   HGB 7.1* 8.9*   HCT 20.0* 25.3*    303   MCV 95.8 94.9        Recent Labs     07/24/21  0439 07/25/21  0616    141   K 4.1 4.8    107   CO2 24 24   BUN 10 8   CREATININE 0.8* 0.7*     Recent Labs     07/24/21 0439   AST 76*   ALT 58*   BILITOT 5.3*   ALKPHOS 110       Magnesium:    Lab Results   Component Value Date    MG 1.80 05/05/2021    MG 1.80 05/04/2021    MG 1.90 01/12/2021         Problem List  Patient Active Problem List   Diagnosis    [facility]    Asthma    [facility] crisis ([facility]    Sepsis ([facility]    Opiate overdose ([facility]    Opiate antagonist poisoning, accidental or unintentional, initial encounter    Leukocytosis    Hypoxia    Anemia    Other chest pain    Pneumonia due to infectious organism    Fever    Chronic prescription opiate use       ASSESSMENT AND PLAN:    Sickle cell crisis:  -He is currently on OxyContin 20 mg p.o. every 12  -Oxycodone 10 mg p.o. every 4 as needed  -Dilaudid 2 mg IV every 4 as needed  -He is also receiving Toradol 30 mg IV every 8x9 doses  -He states he feels that his pain is getting better  -He asked to increase his Dilaudid, but I told him we could not due to potential respiratory depression which has been a problem in the past.    Drug screen:  -Urine drug screen is negative  -Serum drug screen is pending  -I really do not suspect that he is using other narcotics  -I also doubt that he is diverting drugs  -My prime concern is that he is not dealing with his sickle cell and would prefer to be in the hospital or take medication and sit in his room at home. This was told to me by his parents. I am worried he has very little motivation to get out and do anything. He denies depression, but I am not sure that is not part of the problem.  -He is going to see a pain specialist, because I will not write for any more pain medication as of 60 days from 6/29/2021.  -He is already made excuses on why this cannot be done, but 60 days is double what is required notice of asking him to seek another opinion.           ONCOLOGIC DISPOSITION:  -When his pain is controlled    Duong Dinero MD  Please contact through [address]

## 2021-07-26 ENCOUNTER — APPOINTMENT (OUTPATIENT)
Dept: GENERAL RADIOLOGY | Age: 21
DRG: 812 | End: 2021-07-26
Payer: COMMERCIAL

## 2021-07-26 PROCEDURE — 73560 X-RAY EXAM OF KNEE 1 OR 2: CPT

## 2021-07-26 PROCEDURE — 6370000000 HC RX 637 (ALT 250 FOR IP): Performed by: INTERNAL MEDICINE

## 2021-07-26 PROCEDURE — 72100 X-RAY EXAM L-S SPINE 2/3 VWS: CPT

## 2021-07-26 PROCEDURE — 6360000002 HC RX W HCPCS: Performed by: INTERNAL MEDICINE

## 2021-07-26 PROCEDURE — 6360000002 HC RX W HCPCS: Performed by: NURSE PRACTITIONER

## 2021-07-26 PROCEDURE — 1200000000 HC SEMI PRIVATE

## 2021-07-26 PROCEDURE — 2580000003 HC RX 258: Performed by: NURSE PRACTITIONER

## 2021-07-26 PROCEDURE — 2580000003 HC RX 258: Performed by: INTERNAL MEDICINE

## 2021-07-26 RX ORDER — NALOXONE HYDROCHLORIDE 0.4 MG/ML
0.4 INJECTION, SOLUTION INTRAMUSCULAR; INTRAVENOUS; SUBCUTANEOUS PRN
Status: DISCONTINUED | OUTPATIENT
Start: 2021-07-26 | End: 2021-07-27

## 2021-07-26 RX ORDER — HYDROMORPHONE HCL 110MG/55ML
2 PATIENT CONTROLLED ANALGESIA SYRINGE INTRAVENOUS ONCE
Status: COMPLETED | OUTPATIENT
Start: 2021-07-26 | End: 2021-07-26

## 2021-07-26 RX ORDER — KETOROLAC TROMETHAMINE 30 MG/ML
30 INJECTION, SOLUTION INTRAMUSCULAR; INTRAVENOUS EVERY 6 HOURS
Status: COMPLETED | OUTPATIENT
Start: 2021-07-26 | End: 2021-07-29

## 2021-07-26 RX ORDER — DEXAMETHASONE SODIUM PHOSPHATE 10 MG/ML
10 INJECTION INTRAMUSCULAR; INTRAVENOUS ONCE
Status: COMPLETED | OUTPATIENT
Start: 2021-07-26 | End: 2021-07-26

## 2021-07-26 RX ORDER — DEXTROSE AND SODIUM CHLORIDE 5; .45 G/100ML; G/100ML
INJECTION, SOLUTION INTRAVENOUS CONTINUOUS
Status: ACTIVE | OUTPATIENT
Start: 2021-07-26 | End: 2021-07-27

## 2021-07-26 RX ORDER — HYDROMORPHONE HCL 110MG/55ML
1 PATIENT CONTROLLED ANALGESIA SYRINGE INTRAVENOUS ONCE
Status: COMPLETED | OUTPATIENT
Start: 2021-07-26 | End: 2021-07-26

## 2021-07-26 RX ORDER — LORAZEPAM 2 MG/ML
1 INJECTION INTRAMUSCULAR ONCE
Status: COMPLETED | OUTPATIENT
Start: 2021-07-26 | End: 2021-07-26

## 2021-07-26 RX ORDER — PANTOPRAZOLE SODIUM 40 MG/1
40 TABLET, DELAYED RELEASE ORAL
Status: DISCONTINUED | OUTPATIENT
Start: 2021-07-27 | End: 2021-07-30 | Stop reason: HOSPADM

## 2021-07-26 RX ADMIN — KETOROLAC TROMETHAMINE 30 MG: 30 INJECTION, SOLUTION INTRAMUSCULAR; INTRAVENOUS at 21:39

## 2021-07-26 RX ADMIN — OXYCODONE HYDROCHLORIDE 20 MG: 20 TABLET, FILM COATED, EXTENDED RELEASE ORAL at 21:39

## 2021-07-26 RX ADMIN — HYDROMORPHONE HYDROCHLORIDE 2 MG: 2 INJECTION, SOLUTION INTRAMUSCULAR; INTRAVENOUS; SUBCUTANEOUS at 08:56

## 2021-07-26 RX ADMIN — HYDROMORPHONE HYDROCHLORIDE 1 MG: 2 INJECTION, SOLUTION INTRAMUSCULAR; INTRAVENOUS; SUBCUTANEOUS at 02:55

## 2021-07-26 RX ADMIN — HYDROMORPHONE HYDROCHLORIDE 2 MG: 2 INJECTION, SOLUTION INTRAMUSCULAR; INTRAVENOUS; SUBCUTANEOUS at 01:11

## 2021-07-26 RX ADMIN — OXYCODONE HYDROCHLORIDE 20 MG: 20 TABLET, FILM COATED, EXTENDED RELEASE ORAL at 08:56

## 2021-07-26 RX ADMIN — HYDROMORPHONE HYDROCHLORIDE: 10 INJECTION, SOLUTION INTRAMUSCULAR; INTRAVENOUS; SUBCUTANEOUS at 11:38

## 2021-07-26 RX ADMIN — Medication 10 ML: at 05:54

## 2021-07-26 RX ADMIN — OXYCODONE 10 MG: 5 TABLET ORAL at 07:43

## 2021-07-26 RX ADMIN — HYDROMORPHONE HYDROCHLORIDE 2 MG: 2 INJECTION, SOLUTION INTRAMUSCULAR; INTRAVENOUS; SUBCUTANEOUS at 05:53

## 2021-07-26 RX ADMIN — HYDROMORPHONE HYDROCHLORIDE: 10 INJECTION, SOLUTION INTRAMUSCULAR; INTRAVENOUS; SUBCUTANEOUS at 21:39

## 2021-07-26 RX ADMIN — KETOROLAC TROMETHAMINE 30 MG: 30 INJECTION, SOLUTION INTRAMUSCULAR; INTRAVENOUS at 08:56

## 2021-07-26 RX ADMIN — HYDROXYUREA 1000 MG: 500 CAPSULE ORAL at 21:47

## 2021-07-26 RX ADMIN — KETOROLAC TROMETHAMINE 30 MG: 30 INJECTION, SOLUTION INTRAMUSCULAR; INTRAVENOUS at 16:05

## 2021-07-26 RX ADMIN — DEXTROSE AND SODIUM CHLORIDE: 5; 450 INJECTION, SOLUTION INTRAVENOUS at 20:01

## 2021-07-26 RX ADMIN — Medication 10 ML: at 07:01

## 2021-07-26 RX ADMIN — HYDROXYUREA 1000 MG: 500 CAPSULE ORAL at 08:57

## 2021-07-26 RX ADMIN — OXYCODONE 10 MG: 5 TABLET ORAL at 03:42

## 2021-07-26 RX ADMIN — DEXTROSE AND SODIUM CHLORIDE: 5; 450 INJECTION, SOLUTION INTRAVENOUS at 11:33

## 2021-07-26 RX ADMIN — SODIUM CHLORIDE, PRESERVATIVE FREE 10 ML: 5 INJECTION INTRAVENOUS at 10:13

## 2021-07-26 RX ADMIN — HYDROMORPHONE HYDROCHLORIDE 2 MG: 2 INJECTION, SOLUTION INTRAMUSCULAR; INTRAVENOUS; SUBCUTANEOUS at 04:29

## 2021-07-26 RX ADMIN — LORAZEPAM 1 MG: 2 INJECTION INTRAMUSCULAR; INTRAVENOUS at 07:01

## 2021-07-26 RX ADMIN — KETOROLAC TROMETHAMINE 30 MG: 30 INJECTION, SOLUTION INTRAMUSCULAR; INTRAVENOUS at 00:05

## 2021-07-26 RX ADMIN — DEXAMETHASONE SODIUM PHOSPHATE 10 MG: 10 INJECTION INTRAMUSCULAR; INTRAVENOUS at 11:45

## 2021-07-26 ASSESSMENT — PAIN SCALES - GENERAL
PAINLEVEL_OUTOF10: 10
PAINLEVEL_OUTOF10: 10
PAINLEVEL_OUTOF10: 9
PAINLEVEL_OUTOF10: 10
PAINLEVEL_OUTOF10: 8
PAINLEVEL_OUTOF10: 10
PAINLEVEL_OUTOF10: 10
PAINLEVEL_OUTOF10: 9
PAINLEVEL_OUTOF10: 10

## 2021-07-26 NOTE — PROGRESS NOTES
Perfect Serve to Dr. Marte Argue:     Pt. crying out in pain, all over the bed. C/O pain 10/10. Received PRN oxycodone 10mg at 740p. Not due for Dilaudid until 840pm. Can we have a one time dose or give PRN Dilaudid early?

## 2021-07-26 NOTE — PROGRESS NOTES
Hospitalist Progress Note      PCP: Kishore Diamond MD    Date of Admission: 7/24/2021       Chief Complaint:   Right Arm/shoulder pain        Hospital Course: 24 y. o. male with hx of sickle cell anemia who presented to [facility] with  Acute onset of right upper arm/shoulder pain and is being treated for suspected sickle cell crisis. Hemonc consulted.     Subjective:  Resting, more alert today, pain better in arm but now notes back pain/knee pain          Medications:  Reviewed    Infusion Medications    sodium chloride      sodium chloride       Scheduled Medications    sodium chloride flush  5-40 mL Intravenous 2 times per day    enoxaparin  40 mg Subcutaneous Daily    ketorolac  30 mg Intravenous Q8H    hydroxyurea  1,000 mg Oral BID    oxyCODONE  20 mg Oral 2 times per day     PRN Meds: budesonide-formoterol, sodium chloride, sodium chloride flush, sodium chloride, ondansetron **OR** ondansetron, polyethylene glycol, acetaminophen **OR** acetaminophen, oxyCODONE HCl, albuterol sulfate HFA, HYDROmorphone      Intake/Output Summary (Last 24 hours) at 7/26/2021 1877  Last data filed at 7/26/2021 0701  Gross per 24 hour   Intake 2048 ml   Output 1900 ml   Net 148 ml       Physical Exam Performed:    BP (!) 114/53   Pulse 106   Temp 98.5 °F (36.9 °C) (Oral)   Resp 22   Ht 5' 8\" (1.727 m)   Wt 210 lb 1.6 oz (95.3 kg)   SpO2 94%   BMI 31.95 kg/m²     General appearance: No apparent distress, appears stated age and cooperative. HEENT: Pupils equal, round, and reactive to light. Conjunctivae/corneas clear. Neck: Supple, with full range of motion. No jugular venous distention. Trachea midline. Respiratory:  Normal respiratory effort. Clear to auscultation, bilaterally without Rales/Wheezes/Rhonchi. Cardiovascular: Regular rate and rhythm with normal S1/S2 without murmurs, rubs or gallops. Abdomen: Soft, non-tender, non-distended with normal bowel sounds.   Musculoskeletal: No clubbing, cyanosis or edema bilaterally. Full range of motion without deformity. Skin: Skin color, texture, turgor normal.  No rashes or lesions. Neurologic:  Neurovascularly intact without any focal sensory/motor deficits. Cranial nerves: II-XII intact, grossly non-focal.  Psychiatric: Alert and oriented, thought content appropriate, normal insight  Capillary Refill: Brisk,3 seconds, normal   Peripheral Pulses: +2 palpable, equal bilaterally       Labs:   Recent Labs     07/24/21 0439 07/25/21 0616   WBC 18.7* 13.6*   HGB 7.1* 8.9*   HCT 20.0* 25.3*    303     Recent Labs     07/24/21 0439 07/25/21 0616    141   K 4.1 4.8    107   CO2 24 24   BUN 10 8   CREATININE 0.8* 0.7*   CALCIUM 9.8 9.3     Recent Labs     07/24/21 0439   AST 76*   ALT 58*   BILITOT 5.3*   ALKPHOS 110     No results for input(s): INR in the last 72 hours. No results for input(s): Janette Inga in the last 72 hours. Urinalysis:      Lab Results   Component Value Date    NITRU Negative 06/09/2021    WBCUA None seen 06/09/2021    BACTERIA Rare 06/09/2021    RBCUA 0-2 06/09/2021    BLOODU TRACE-INTACT 06/09/2021    SPECGRAV 1.010 06/09/2021    GLUCOSEU Negative 06/09/2021       Radiology:  XR CHEST PORTABLE   Final Result   No acute process. Assessment/Plan:    Active Hospital Problems    Diagnosis     Sickle cell crisis ([facility]) [D57.00]           Rigiht arm/shoulder pain- with concern for Sickle cell pain crisis.   - per emr, this will be his 6th presentation since beginning of May 2021.    -ordered 2u pRBCs on 7/24/21.   -hemonc consulted and managing pain  -continued ivfs, hydroxyurea  -previously pt was sent on oxycodone 10 mg prescription through the [facility] EMR.   Continued home chronic pain meds   -there have been concerns for developing addiction behavior/misuse of opiods in the recent past, hemonc aware and recommended that pt arrange f/u with pain mgmt  -given scheduled toradol 30mg q8h for 9 doses   -pt noted back/knee pain on 7/26, unclear if this is also crisis related, xrays ordered by hemonc    Sickle cell anemia - followed and transfused per Heme/Onc.   -2u prbc ordered     Asthma - controlled on home inhaled bronchodilators - continued. .     DVT Prophylaxis: lovenox  Diet: ADULT DIET;  Regular  Code Status: Full Code    PT/OT Eval Status: not needed    Dispo - pending Fang Stover MD

## 2021-07-26 NOTE — CARE COORDINATION
Pt with high Risk of Readmission Score of 39%, currently resting calmly in bed with eyes closed. Chart reviewed, pt has had difficulty with pain control, writer will not wake pt now that he is calm. CYNTHIA Cabrales     5483 Addendum:  Writer met with pt briefly in room, pt appears to be in acute pain, trying to get comfortable. Writer asked pt about home, says he lives with his parents and they are supportive. Writer asked if pt uses any support groups or social media for sickle cell community, pt said he has not but may look into that, states he only knows 1 person with sickle cell and they do not talk about the disease. No discharge needs noted at this time, please notify CM if needs arise.   CYNTHIA Cabrales

## 2021-07-26 NOTE — PROGRESS NOTES
ONCOLOGY HEMATOLOGY CARE PROGRESS NOTE      SUBJECTIVE:     Patient in tears this AM. He is asking for a PCA pump. Cont to have severe 10/10 pain to his lower back and bilateral knees. ROS:     Constitutional:  No weight loss, No fever, No chills, No night sweats. Energy level good. Eyes:  No impairment or change in vision  ENT / Mouth:  No pain, abnormal ulceration, bleeding, nasal drip or change in voice or hearing  Cardiovascular:  No chest pain, palpitations, new edema, or calf discomfort  Respiratory:  No pain, hemoptysis, change to breathing  Breast:  No pain, discharge, change in appearance or texture  Gastrointestinal:  No pain, cramping, jaundice, change to eating and bowel habits  Urinary:  No pain, bleeding or change in continence  Genitalia: No pain, bleeding or discharge  Musculoskeletal:  see HPI above   Skin:  No pruritus, rash, change to nodules or lesions  Neurologic:  No discomfort, change in mental status, speech, sensory or motor activity  Psychiatric:  No change in concentration or change to affect or mood  Endocrine:  No hot flashes, increased thirst, or change to urine production  Hematologic: No petechiae, ecchymosis or bleeding  Lymphatic:  No lymphadenopathy or lymphedema  Allergy / Immunologic:  No eczema, hives, frequent or recurrent infections    OBJECTIVE        Physical    VITALS:  BP (!) 114/53   Pulse 106   Temp 98.5 °F (36.9 °C) (Oral)   Resp 22   Ht 5' 8\" (1.727 m)   Wt 210 lb 1.6 oz (95.3 kg)   SpO2 94%   BMI 31.95 kg/m²   TEMPERATURE:  Current - Temp: 98.5 °F (36.9 °C);  Max - Temp  Av.6 °F (37 °C)  Min: 98 °F (36.7 °C)  Max: 99.3 °F (37.4 °C)  PULSE OXIMETRY RANGE: SpO2  Av.6 %  Min: 94 %  Max: 100 %  24HR INTAKE/OUTPUT:      Intake/Output Summary (Last 24 hours) at 2021 1104  Last data filed at 2021 0701  Gross per 24 hour   Intake 2048 ml   Output 1900 ml   Net 148 ml       CONSTITUTIONAL: awake, alert, cooperative, no apparent distress   EYES: pupils equal, round and reactive to light, sclera clear and conjunctiva normal  ENT: Normocephalic, without obvious abnormality, atraumatic  NECK: supple, symmetrical, no jugular venous distension and no carotid bruits   HEMATOLOGIC/LYMPHATIC: no cervical, supraclavicular or axillary lymphadenopathy   LUNGS: no increased work of breathing and clear to auscultation   CARDIOVASCULAR: regular rate and rhythm, normal S1 and S2, no murmur noted  ABDOMEN: normal bowel sounds x 4, soft, non-distended, non-tender, no masses palpated, no hepatosplenomgaly   MUSCULOSKELETAL: full range of motion noted, tone is normal  NEUROLOGIC: awake, alert, oriented to name, place and time. Motor skills grossly intact. SKIN: Normal skin color, texture, turgor and no jaundice.  appears intact   EXTREMITIES: no LE edema       Data      Recent Labs     07/24/21  0439 07/25/21  0616   WBC 18.7* 13.6*   HGB 7.1* 8.9*   HCT 20.0* 25.3*    303   MCV 95.8 94.9        Recent Labs     07/24/21  0439 07/25/21  0616    141   K 4.1 4.8    107   CO2 24 24   BUN 10 8   CREATININE 0.8* 0.7*     Recent Labs     07/24/21  0439   AST 76*   ALT 58*   BILITOT 5.3*   ALKPHOS 110       Magnesium:    Lab Results   Component Value Date    MG 1.80 05/05/2021    MG 1.80 05/04/2021    MG 1.90 01/12/2021         Problem List  Patient Active Problem List   Diagnosis    [facility]    Asthma    Sick[facility] crisis [facility]    Sepsis ([facility]    Opiate overdose ([facility]    Opiate antagonist poisoning, accidental or unintentional, initial encounter    Leukocytosis    Hypoxia    Anemia    Other chest pain    Pneumonia due to infectious organism    Fever    Chronic prescription opiate use       ASSESSMENT AND PLAN:    Sickle cell crisis:  -He is currently on OxyContin 20 mg p.o. every 12  -Oxycodone 10 mg p.o. every 4 as needed- stop this while on Demand only PCA   -Dilaudid 2 mg IV

## 2021-07-26 NOTE — PROGRESS NOTES
Pt with reported uncontrolled pain tonight. Pt medicated with:  10 mg PO shruti IR at 1926  2 mg PRN IV dilaudid at 2006  20 mg PO oxycontin ER scheduled at 2103  2 mg ONE TIME IV dilaudid at 2117  10 mg PRN shruti IR at 2340  30 mg scheduled IV toradol at 0005  2 mg PRN IV dilaudid 0111  1 mg ONE TIME IV dilaudid at 0255  10 mg PRN PO shruti IR at 0342  2 mg ONE TIME IV dilaudid 0429  2 mg PRN IV Dilaudid at 0553    Heat packs were offered and refused multiple times. Pt later agreeable to heat packs. Pt later stated that heat packs were of no help. Then requesting ice packs. Ice packs provided with no relief voiced. Pt has gone from laying in bed calm and quiet to immediately screaming and writhing in pain requesting oncology be paged for additional IV dilaudid- one time orders entered and administered. Pulse oximetry added d/t oxygenation issues historically. Pt was noted to drop to 88% with IV dilaudid administration x2 but the pleth was poor. RANDI VALDEZ [facility] RN reported pt becoming lethargic after these two doses and was encouraged to do deep breathing exercises and stay alert so antagonist medications would not be required. Writer entered room when pt screaming and writhing in bed d/t pain to find pt attempting to connect himself and start IVF. Writer expressed inappropriateness of this and inquired why pt felt he needed IVF. Pt states that he felt better when IVF were infusing. Writer explained that his doctor discontinued this likely for a reason and that we could reevaluate this decision today when rounding occurs. Writer educated pt on risks of infection, air emboli, etc..with self managing IVF. IV bag and tubing thrown away. Pt repeatedly requesting a back massage for his pain during this time. Writer explained that a back massage is not a reasonable sole intervention for 10/10 pain causing screaming and writhing in bed.  Back massage was provided with immediate pause in screaming- writer conversed with pt that we need to safely manage his pain and that it would be helpful to know when pain is increasing rather than bursts of pain suddenly causing screaming agony. Writer expressed concern that his characteristics may be r/t anxiety as pt has requested staff stay in room until a comfortable level of pain control has been achieved. Pt denies that this is anxiety related at all. Pt states that he was on meds for anxiety at one time but that they were no help and he doesn't recall what they were. Pt does report that this pain is identical to his typical sickle cell pain. BP stable. Concern expressed by multiple staff about possible drug seeking behaviors as pt was screaming in pain and then received a text and immediately halted screaming to read text message. Dr. Christine Ziegler had returned call at bedside at this time and pt immediately began screaming in pain when call was answered. Dr. Christine Ziegler expressed potential desire for PCA pump and PRBC transfusion today- pt updated. Pt currently resting after latest IV dilaudid PRN administration.

## 2021-07-26 NOTE — PROGRESS NOTES
Pt requesting more pain medication. Pt was able to achieve resting state for 35 minutes after last IV dilaudid. Hemoc paged. , O2 96% on RA.

## 2021-07-26 NOTE — PROGRESS NOTES
Pt calling out non-stop since 0730, hyperventilating, rocking in the bed. C/o pain. PRN roxicodone administered at 0743. Pt asked when he could have dilaudid. Explained to pt dilaudid was not available until [id number]. Pt continued to call out for pain medications in excess of 5 times from administration of PO pain medication. Dilaudid administered per orders when available. Pt was again rocking in bed and hyperventilating. Within 2 mins of dilaudid administration, pt was asleep, had to be prompted to take scheduled am pills, spilling water in bed. VSS. Will continue to monitor.

## 2021-07-27 LAB
A/G RATIO: 1.1 (ref 1.1–2.2)
ALBUMIN SERPL-MCNC: 3.8 G/DL (ref 3.4–5)
ALP BLD-CCNC: 197 U/L (ref 40–129)
ALT SERPL-CCNC: 26 U/L (ref 10–40)
ANION GAP SERPL CALCULATED.3IONS-SCNC: 11 MMOL/L (ref 3–16)
AST SERPL-CCNC: 36 U/L (ref 15–37)
BASOPHILS ABSOLUTE: 0.1 K/UL (ref 0–0.2)
BASOPHILS RELATIVE PERCENT: 0.5 %
BILIRUB SERPL-MCNC: 5.2 MG/DL (ref 0–1)
BILIRUBIN DIRECT: 0.8 MG/DL (ref 0–0.3)
BILIRUBIN, INDIRECT: 4.4 MG/DL (ref 0–1)
BUN BLDV-MCNC: 13 MG/DL (ref 7–20)
CALCIUM SERPL-MCNC: 9.3 MG/DL (ref 8.3–10.6)
CHLORIDE BLD-SCNC: 108 MMOL/L (ref 99–110)
CO2: 21 MMOL/L (ref 21–32)
CREAT SERPL-MCNC: 0.7 MG/DL (ref 0.9–1.3)
EOSINOPHILS ABSOLUTE: 0 K/UL (ref 0–0.6)
EOSINOPHILS RELATIVE PERCENT: 0 %
GFR AFRICAN AMERICAN: >60
GFR NON-AFRICAN AMERICAN: >60
GLOBULIN: 3.4 G/DL
GLUCOSE BLD-MCNC: 109 MG/DL (ref 70–99)
HCT VFR BLD CALC: 23.8 % (ref 40.5–52.5)
HEMATOLOGY PATH CONSULT: NO
HEMOGLOBIN: 8.2 G/DL (ref 13.5–17.5)
IMMATURE RETIC FRACT: 0.67 (ref 0.21–0.37)
LYMPHOCYTES ABSOLUTE: 3 K/UL (ref 1–5.1)
LYMPHOCYTES RELATIVE PERCENT: 14.1 %
MCH RBC QN AUTO: 33.6 PG (ref 26–34)
MCHC RBC AUTO-ENTMCNC: 34.5 G/DL (ref 31–36)
MCV RBC AUTO: 97.4 FL (ref 80–100)
MONOCYTES ABSOLUTE: 1.7 K/UL (ref 0–1.3)
MONOCYTES RELATIVE PERCENT: 8 %
NEUTROPHILS ABSOLUTE: 16.7 K/UL (ref 1.7–7.7)
NEUTROPHILS RELATIVE PERCENT: 77.4 %
PDW BLD-RTO: 21.8 % (ref 12.4–15.4)
PLATELET # BLD: 270 K/UL (ref 135–450)
PMV BLD AUTO: 8 FL (ref 5–10.5)
POTASSIUM SERPL-SCNC: 4.1 MMOL/L (ref 3.5–5.1)
RBC # BLD: 2.45 M/UL (ref 4.2–5.9)
RETICULOCYTE ABSOLUTE COUNT: 0.12 M/UL
RETICULOCYTE COUNT PCT: 4.93 % (ref 0.5–2.18)
SODIUM BLD-SCNC: 140 MMOL/L (ref 136–145)
TOTAL PROTEIN: 7.2 G/DL (ref 6.4–8.2)
WBC # BLD: 21.6 K/UL (ref 4–11)

## 2021-07-27 PROCEDURE — 85045 AUTOMATED RETICULOCYTE COUNT: CPT

## 2021-07-27 PROCEDURE — 85025 COMPLETE CBC W/AUTO DIFF WBC: CPT

## 2021-07-27 PROCEDURE — 80053 COMPREHEN METABOLIC PANEL: CPT

## 2021-07-27 PROCEDURE — 2580000003 HC RX 258: Performed by: INTERNAL MEDICINE

## 2021-07-27 PROCEDURE — 6360000002 HC RX W HCPCS: Performed by: INTERNAL MEDICINE

## 2021-07-27 PROCEDURE — 82248 BILIRUBIN DIRECT: CPT

## 2021-07-27 PROCEDURE — 6370000000 HC RX 637 (ALT 250 FOR IP): Performed by: INTERNAL MEDICINE

## 2021-07-27 PROCEDURE — 6370000000 HC RX 637 (ALT 250 FOR IP): Performed by: NURSE PRACTITIONER

## 2021-07-27 PROCEDURE — 6360000002 HC RX W HCPCS: Performed by: NURSE PRACTITIONER

## 2021-07-27 PROCEDURE — 36415 COLL VENOUS BLD VENIPUNCTURE: CPT

## 2021-07-27 PROCEDURE — 2580000003 HC RX 258: Performed by: NURSE PRACTITIONER

## 2021-07-27 PROCEDURE — 1200000000 HC SEMI PRIVATE

## 2021-07-27 RX ORDER — HYDROMORPHONE HCL 110MG/55ML
0.5 PATIENT CONTROLLED ANALGESIA SYRINGE INTRAVENOUS ONCE
Status: COMPLETED | OUTPATIENT
Start: 2021-07-27 | End: 2021-07-27

## 2021-07-27 RX ORDER — OXYCODONE HYDROCHLORIDE 5 MG/1
10 TABLET ORAL EVERY 4 HOURS PRN
Status: DISCONTINUED | OUTPATIENT
Start: 2021-07-27 | End: 2021-07-30 | Stop reason: HOSPADM

## 2021-07-27 RX ADMIN — KETOROLAC TROMETHAMINE 30 MG: 30 INJECTION, SOLUTION INTRAMUSCULAR; INTRAVENOUS at 09:53

## 2021-07-27 RX ADMIN — PANTOPRAZOLE SODIUM 40 MG: 40 TABLET, DELAYED RELEASE ORAL at 05:02

## 2021-07-27 RX ADMIN — OXYCODONE 10 MG: 5 TABLET ORAL at 16:40

## 2021-07-27 RX ADMIN — OXYCODONE 10 MG: 5 TABLET ORAL at 20:58

## 2021-07-27 RX ADMIN — KETOROLAC TROMETHAMINE 30 MG: 30 INJECTION, SOLUTION INTRAMUSCULAR; INTRAVENOUS at 03:18

## 2021-07-27 RX ADMIN — HYDROXYUREA 1000 MG: 500 CAPSULE ORAL at 20:58

## 2021-07-27 RX ADMIN — SODIUM CHLORIDE, PRESERVATIVE FREE 10 ML: 5 INJECTION INTRAVENOUS at 09:53

## 2021-07-27 RX ADMIN — OXYCODONE 10 MG: 5 TABLET ORAL at 12:43

## 2021-07-27 RX ADMIN — KETOROLAC TROMETHAMINE 30 MG: 30 INJECTION, SOLUTION INTRAMUSCULAR; INTRAVENOUS at 16:06

## 2021-07-27 RX ADMIN — DEXTROSE AND SODIUM CHLORIDE: 5; 450 INJECTION, SOLUTION INTRAVENOUS at 05:02

## 2021-07-27 RX ADMIN — SODIUM CHLORIDE, PRESERVATIVE FREE 10 ML: 5 INJECTION INTRAVENOUS at 21:01

## 2021-07-27 RX ADMIN — OXYCODONE HYDROCHLORIDE 20 MG: 20 TABLET, FILM COATED, EXTENDED RELEASE ORAL at 09:53

## 2021-07-27 RX ADMIN — HYDROXYUREA 1000 MG: 500 CAPSULE ORAL at 09:53

## 2021-07-27 RX ADMIN — HYDROMORPHONE HYDROCHLORIDE 0.5 MG: 2 INJECTION, SOLUTION INTRAMUSCULAR; INTRAVENOUS; SUBCUTANEOUS at 01:32

## 2021-07-27 RX ADMIN — KETOROLAC TROMETHAMINE 30 MG: 30 INJECTION, SOLUTION INTRAMUSCULAR; INTRAVENOUS at 20:58

## 2021-07-27 RX ADMIN — OXYCODONE HYDROCHLORIDE 20 MG: 20 TABLET, FILM COATED, EXTENDED RELEASE ORAL at 20:58

## 2021-07-27 ASSESSMENT — PAIN SCALES - GENERAL
PAINLEVEL_OUTOF10: 6
PAINLEVEL_OUTOF10: 7
PAINLEVEL_OUTOF10: 9
PAINLEVEL_OUTOF10: 6
PAINLEVEL_OUTOF10: 7
PAINLEVEL_OUTOF10: 6
PAINLEVEL_OUTOF10: 10

## 2021-07-27 NOTE — PROGRESS NOTES
ONCOLOGY HEMATOLOGY CARE PROGRESS NOTE      SUBJECTIVE:     The patient states his pain is better and he feels that he can go home. ROS:     Constitutional:  No weight loss, No fever, No chills, No night sweats. Energy level good. Eyes:  No impairment or change in vision  ENT / Mouth:  No pain, abnormal ulceration, bleeding, nasal drip or change in voice or hearing  Cardiovascular:  No chest pain, palpitations, new edema, or calf discomfort  Respiratory:  No pain, hemoptysis, change to breathing  Breast:  No pain, discharge, change in appearance or texture  Gastrointestinal:  No pain, cramping, jaundice, change to eating and bowel habits  Urinary:  No pain, bleeding or change in continence  Genitalia: No pain, bleeding or discharge  Musculoskeletal:  see HPI above   Skin:  No pruritus, rash, change to nodules or lesions  Neurologic:  No discomfort, change in mental status, speech, sensory or motor activity  Psychiatric:  No change in concentration or change to affect or mood  Endocrine:  No hot flashes, increased thirst, or change to urine production  Hematologic: No petechiae, ecchymosis or bleeding  Lymphatic:  No lymphadenopathy or lymphedema  Allergy / Immunologic:  No eczema, hives, frequent or recurrent infections    OBJECTIVE        Physical    VITALS:  /66   Pulse 70   Temp 100.1 °F (37.8 °C) (Oral)   Resp 19   Ht 5' 8\" (1.727 m)   Wt 208 lb 12.8 oz (94.7 kg)   SpO2 98%   BMI 31.75 kg/m²   TEMPERATURE:  Current - Temp: 100.1 °F (37.8 °C);  Max - Temp  Av.4 °F (37.4 °C)  Min: 98.9 °F (37.2 °C)  Max: 100.1 °F (37.8 °C)  PULSE OXIMETRY RANGE: SpO2  Av.5 %  Min: 97 %  Max: 100 %  24HR INTAKE/OUTPUT:      Intake/Output Summary (Last 24 hours) at 2021 0944  Last data filed at 2021 0319  Gross per 24 hour   Intake 2519.36 ml   Output 3750 ml   Net -1230.64 ml       CONSTITUTIONAL: awake, alert, cooperative, no apparent distress   EYES: pupils equal, round and reactive to light, sclera clear and conjunctiva normal  ENT: Normocephalic, without obvious abnormality, atraumatic  NECK: supple, symmetrical, no jugular venous distension and no carotid bruits   HEMATOLOGIC/LYMPHATIC: no cervical, supraclavicular or axillary lymphadenopathy   LUNGS: no increased work of breathing and clear to auscultation   CARDIOVASCULAR: regular rate and rhythm, normal S1 and S2, no murmur noted  ABDOMEN: normal bowel sounds x 4, soft, non-distended, non-tender, no masses palpated, no hepatosplenomgaly   MUSCULOSKELETAL: full range of motion noted, tone is normal  NEUROLOGIC: awake, alert, oriented to name, place and time. Motor skills grossly intact. SKIN: Normal skin color, texture, turgor and no jaundice. appears intact   EXTREMITIES: no LE edema       Data      Recent Labs     07/25/21  0616   WBC 13.6*   HGB 8.9*   HCT 25.3*      MCV 94.9        Recent Labs     07/25/21  0616      K 4.8      CO2 24   BUN 8   CREATININE 0.7*     No results for input(s): AST, ALT, ALB, BILIDIR, BILITOT, ALKPHOS in the last 72 hours.     Magnesium:    Lab Results   Component Value Date    MG 1.80 05/05/2021    MG 1.80 05/04/2021    MG 1.90 01/12/2021         Problem List  Patient Active Problem List   Diagnosis    [facility]    Asthma    [facility]ickle cell crisis ([facility]    Sepsis ([facility]    Opiate overdose ([facility]    Opiate antagonist poisoning, accidental or unintentional, initial encounter    Leukocytosis    Hypoxia    Anemia    Other chest pain    Pneumonia due to infectious organism    Fever    Chronic prescription opiate use       ASSESSMENT AND PLAN:    Sickle cell crisis:  -He is currently on OxyContin 20 mg p.o. every 12  -Oxycodone 10 mg p.o. every 4 as needed- stop this while on Demand only PCA   -Dilaudid 2 mg IV every 4 as needed- stopped 7/26 to change to PCA   -He is also receiving Toradol 30 mg IV every 8 hours- change to every 6 hours for the next 3 days   -XRAY L spine and bilateral knees looking for infarction   - Dex 10 mg IV X 1 today for pain as well.  PPI added   - Hgb 8.9 ; repeat in AM   - Start IV hydration as well today with D5 0.45 NS         ONCOLOGIC DISPOSITION:  -The patient states that he feels he can go home today  -I will call in his narcotics electronically from our office computer  -I will likely add Toradol for acute crisis  -I continue to encourage him to see pain management  -I do not think Yinka Corbett is helping him stay out of the hospital  -I may try to change him to Sayda Bolden which is orally if we can get this approved    Wing Osborn MD  May be reached through [address] [place] [address]

## 2021-07-27 NOTE — CARE COORDINATION
Discussed plan of care with team during huddle today. Pt will likely discharge home tomorrow pending pain control. No needs.  Adán Fan RN

## 2021-07-27 NOTE — PROGRESS NOTES
Hospitalist Progress Note      PCP: Lizett Plasencia MD    Date of Admission: 7/24/2021       Chief Complaint:   Right Arm/shoulder pain        Hospital Course: 24 y. o. male with hx of sickle cell anemia who presented to [facility] with  Acute onset of right upper arm/shoulder pain and is being treated for suspected sickle cell crisis. Hemonc consulted.     Subjective:  Resting, more alert today, pain better   Wanted to go home  Still on PCA pump      Medications:  Reviewed    Infusion Medications    dextrose 5 % and 0.45 % NaCl 125 mL/hr at 07/27/21 0502    HYDROmorphone      sodium chloride      sodium chloride       Scheduled Medications    ketorolac  30 mg Intravenous Q6H    pantoprazole  40 mg Oral QAM AC    sodium chloride flush  5-40 mL Intravenous 2 times per day    enoxaparin  40 mg Subcutaneous Daily    hydroxyurea  1,000 mg Oral BID    oxyCODONE  20 mg Oral 2 times per day     PRN Meds: naloxone, budesonide-formoterol, sodium chloride, sodium chloride flush, sodium chloride, ondansetron **OR** ondansetron, polyethylene glycol, acetaminophen **OR** acetaminophen, albuterol sulfate HFA      Intake/Output Summary (Last 24 hours) at 7/27/2021 0721  Last data filed at 7/27/2021 0319  Gross per 24 hour   Intake 2519.36 ml   Output 3750 ml   Net -1230.64 ml       Physical Exam Performed:    /66   Pulse 70   Temp 100.1 °F (37.8 °C) (Oral)   Resp 19   Ht 5' 8\" (1.727 m)   Wt 208 lb 12.8 oz (94.7 kg)   SpO2 98%   BMI 31.75 kg/m²     General appearance: No apparent distress, appears stated age and cooperative. HEENT: Pupils equal, round, and reactive to light. Conjunctivae/corneas clear. Neck: Supple, with full range of motion. No jugular venous distention. Trachea midline. Respiratory:  Normal respiratory effort. Clear to auscultation, bilaterally without Rales/Wheezes/Rhonchi.   Cardiovascular: Regular rate and rhythm with normal S1/S2 without murmurs, rubs or gallops. Abdomen: Soft, non-tender, non-distended with normal bowel sounds. Musculoskeletal: No clubbing, cyanosis or edema bilaterally. Full range of motion without deformity. Skin: Skin color, texture, turgor normal.  No rashes or lesions. Neurologic:  Neurovascularly intact without any focal sensory/motor deficits. Cranial nerves: II-XII intact, grossly non-focal.  Psychiatric: Alert and oriented, thought content appropriate, normal insight  Capillary Refill: Brisk,3 seconds, normal   Peripheral Pulses: +2 palpable, equal bilaterally       Labs:   Recent Labs     07/25/21 0616   WBC 13.6*   HGB 8.9*   HCT 25.3*        Recent Labs     07/25/21 0616      K 4.8      CO2 24   BUN 8   CREATININE 0.7*   CALCIUM 9.3     No results for input(s): AST, ALT, BILIDIR, BILITOT, ALKPHOS in the last 72 hours. No results for input(s): INR in the last 72 hours. No results for input(s): Alanna Breeze in the last 72 hours. Urinalysis:      Lab Results   Component Value Date    NITRU Negative 06/09/2021    WBCUA None seen 06/09/2021    BACTERIA Rare 06/09/2021    RBCUA 0-2 06/09/2021    BLOODU TRACE-INTACT 06/09/2021    SPECGRAV 1.010 06/09/2021    GLUCOSEU Negative 06/09/2021       Radiology:  XR LUMBAR SPINE (2-3 VIEWS)   Final Result   No acute osseous abnormality. XR KNEE RIGHT (1-2 VIEWS)   Final Result   Unremarkable appearance of both knees         XR KNEE LEFT (1-2 VIEWS)   Final Result   Unremarkable appearance of both knees         XR CHEST PORTABLE   Final Result   No acute process. Assessment/Plan:    Active Hospital Problems    Diagnosis     Sickle cell crisis ([facility] [address] [D57.00]           Rigiht arm/shoulder pain- with concern for Sickle cell pain crisis.   - per emr, this will be his 6th presentation since beginning of May 2021.    -s /p 2u pRBCs on 7/24/21.   -hemonc consulted and managing pain and following  -continued ivfs, hydroxyurea  -previously pt was sent

## 2021-07-27 NOTE — PROGRESS NOTES
Assessment complete and charted earlier in shift. Pt did require two one time doses of 0.5 mg IV dilaudid at 2151 and 0132. Pt resting within a few minutes of administration. Pt has been mostly asleep this shift. VSS but 100.1 oral temperature sustained. Dilaudid PCA syringe changed at start of shift. Call light within reach, will continue to monitor.

## 2021-07-28 LAB
ANION GAP SERPL CALCULATED.3IONS-SCNC: 16 MMOL/L (ref 3–16)
BASOPHILS ABSOLUTE: 0.1 K/UL (ref 0–0.2)
BASOPHILS RELATIVE PERCENT: 0.6 %
BUN BLDV-MCNC: 13 MG/DL (ref 7–20)
CALCIUM SERPL-MCNC: 9.4 MG/DL (ref 8.3–10.6)
CHLORIDE BLD-SCNC: 104 MMOL/L (ref 99–110)
CO2: 18 MMOL/L (ref 21–32)
CREAT SERPL-MCNC: 0.8 MG/DL (ref 0.9–1.3)
EOSINOPHILS ABSOLUTE: 0 K/UL (ref 0–0.6)
EOSINOPHILS RELATIVE PERCENT: 0.2 %
GFR AFRICAN AMERICAN: >60
GFR NON-AFRICAN AMERICAN: >60
GLUCOSE BLD-MCNC: 98 MG/DL (ref 70–99)
HCT VFR BLD CALC: 23.9 % (ref 40.5–52.5)
HEMOGLOBIN: 8.1 G/DL (ref 13.5–17.5)
LACTIC ACID: 0.5 MMOL/L (ref 0.4–2)
LYMPHOCYTES ABSOLUTE: 1.9 K/UL (ref 1–5.1)
LYMPHOCYTES RELATIVE PERCENT: 10.5 %
MCH RBC QN AUTO: 32.7 PG (ref 26–34)
MCHC RBC AUTO-ENTMCNC: 34.1 G/DL (ref 31–36)
MCV RBC AUTO: 95.8 FL (ref 80–100)
MONOCYTES ABSOLUTE: 1 K/UL (ref 0–1.3)
MONOCYTES RELATIVE PERCENT: 5.8 %
NEUTROPHILS ABSOLUTE: 15 K/UL (ref 1.7–7.7)
NEUTROPHILS RELATIVE PERCENT: 82.9 %
PDW BLD-RTO: 20.5 % (ref 12.4–15.4)
PLATELET # BLD: 301 K/UL (ref 135–450)
PMV BLD AUTO: 8.6 FL (ref 5–10.5)
POTASSIUM REFLEX MAGNESIUM: 4.7 MMOL/L (ref 3.5–5.1)
PROCALCITONIN: 0.24 NG/ML (ref 0–0.15)
RBC # BLD: 2.49 M/UL (ref 4.2–5.9)
SARS-COV-2, NAAT: NOT DETECTED
SODIUM BLD-SCNC: 138 MMOL/L (ref 136–145)
WBC # BLD: 18.2 K/UL (ref 4–11)

## 2021-07-28 PROCEDURE — 36415 COLL VENOUS BLD VENIPUNCTURE: CPT

## 2021-07-28 PROCEDURE — 6370000000 HC RX 637 (ALT 250 FOR IP): Performed by: INTERNAL MEDICINE

## 2021-07-28 PROCEDURE — 87086 URINE CULTURE/COLONY COUNT: CPT

## 2021-07-28 PROCEDURE — 80048 BASIC METABOLIC PNL TOTAL CA: CPT

## 2021-07-28 PROCEDURE — 6360000002 HC RX W HCPCS: Performed by: INTERNAL MEDICINE

## 2021-07-28 PROCEDURE — 84145 PROCALCITONIN (PCT): CPT

## 2021-07-28 PROCEDURE — 2580000003 HC RX 258: Performed by: INTERNAL MEDICINE

## 2021-07-28 PROCEDURE — 87040 BLOOD CULTURE FOR BACTERIA: CPT

## 2021-07-28 PROCEDURE — 87635 SARS-COV-2 COVID-19 AMP PRB: CPT

## 2021-07-28 PROCEDURE — 1200000000 HC SEMI PRIVATE

## 2021-07-28 PROCEDURE — 83605 ASSAY OF LACTIC ACID: CPT

## 2021-07-28 PROCEDURE — 85025 COMPLETE CBC W/AUTO DIFF WBC: CPT

## 2021-07-28 PROCEDURE — 6370000000 HC RX 637 (ALT 250 FOR IP): Performed by: NURSE PRACTITIONER

## 2021-07-28 PROCEDURE — 6360000002 HC RX W HCPCS: Performed by: NURSE PRACTITIONER

## 2021-07-28 RX ORDER — SODIUM CHLORIDE 9 MG/ML
INJECTION, SOLUTION INTRAVENOUS CONTINUOUS
Status: DISCONTINUED | OUTPATIENT
Start: 2021-07-28 | End: 2021-07-30 | Stop reason: HOSPADM

## 2021-07-28 RX ADMIN — ACETAMINOPHEN 650 MG: 325 TABLET ORAL at 05:00

## 2021-07-28 RX ADMIN — SODIUM CHLORIDE: 9 INJECTION, SOLUTION INTRAVENOUS at 10:42

## 2021-07-28 RX ADMIN — HYDROXYUREA 1000 MG: 500 CAPSULE ORAL at 20:50

## 2021-07-28 RX ADMIN — CEFEPIME HYDROCHLORIDE 2000 MG: 2 INJECTION, POWDER, FOR SOLUTION INTRAVENOUS at 13:43

## 2021-07-28 RX ADMIN — OXYCODONE 10 MG: 5 TABLET ORAL at 01:07

## 2021-07-28 RX ADMIN — HYDROXYUREA 1000 MG: 500 CAPSULE ORAL at 10:23

## 2021-07-28 RX ADMIN — KETOROLAC TROMETHAMINE 30 MG: 30 INJECTION, SOLUTION INTRAMUSCULAR; INTRAVENOUS at 03:10

## 2021-07-28 RX ADMIN — OXYCODONE 10 MG: 5 TABLET ORAL at 16:45

## 2021-07-28 RX ADMIN — SODIUM CHLORIDE, PRESERVATIVE FREE 10 ML: 5 INJECTION INTRAVENOUS at 10:44

## 2021-07-28 RX ADMIN — VANCOMYCIN HYDROCHLORIDE 1250 MG: 10 INJECTION, POWDER, LYOPHILIZED, FOR SOLUTION INTRAVENOUS at 10:38

## 2021-07-28 RX ADMIN — SODIUM CHLORIDE, PRESERVATIVE FREE 10 ML: 5 INJECTION INTRAVENOUS at 22:21

## 2021-07-28 RX ADMIN — PANTOPRAZOLE SODIUM 40 MG: 40 TABLET, DELAYED RELEASE ORAL at 05:03

## 2021-07-28 RX ADMIN — KETOROLAC TROMETHAMINE 30 MG: 30 INJECTION, SOLUTION INTRAMUSCULAR; INTRAVENOUS at 16:44

## 2021-07-28 RX ADMIN — KETOROLAC TROMETHAMINE 30 MG: 30 INJECTION, SOLUTION INTRAMUSCULAR; INTRAVENOUS at 10:43

## 2021-07-28 RX ADMIN — OXYCODONE 10 MG: 5 TABLET ORAL at 20:56

## 2021-07-28 RX ADMIN — VANCOMYCIN HYDROCHLORIDE 1250 MG: 10 INJECTION, POWDER, LYOPHILIZED, FOR SOLUTION INTRAVENOUS at 22:20

## 2021-07-28 RX ADMIN — OXYCODONE HYDROCHLORIDE 20 MG: 20 TABLET, FILM COATED, EXTENDED RELEASE ORAL at 13:51

## 2021-07-28 RX ADMIN — OXYCODONE 10 MG: 5 TABLET ORAL at 05:07

## 2021-07-28 RX ADMIN — SODIUM CHLORIDE: 9 INJECTION, SOLUTION INTRAVENOUS at 16:49

## 2021-07-28 RX ADMIN — CEFEPIME HYDROCHLORIDE 2000 MG: 2 INJECTION, POWDER, FOR SOLUTION INTRAVENOUS at 20:59

## 2021-07-28 RX ADMIN — OXYCODONE 10 MG: 5 TABLET ORAL at 10:32

## 2021-07-28 RX ADMIN — OXYCODONE HYDROCHLORIDE 20 MG: 20 TABLET, FILM COATED, EXTENDED RELEASE ORAL at 22:17

## 2021-07-28 RX ADMIN — KETOROLAC TROMETHAMINE 30 MG: 30 INJECTION, SOLUTION INTRAMUSCULAR; INTRAVENOUS at 20:50

## 2021-07-28 ASSESSMENT — PAIN SCALES - GENERAL
PAINLEVEL_OUTOF10: 7
PAINLEVEL_OUTOF10: 9
PAINLEVEL_OUTOF10: 9
PAINLEVEL_OUTOF10: 0
PAINLEVEL_OUTOF10: 7
PAINLEVEL_OUTOF10: 6
PAINLEVEL_OUTOF10: 7
PAINLEVEL_OUTOF10: 9
PAINLEVEL_OUTOF10: 9
PAINLEVEL_OUTOF10: 7
PAINLEVEL_OUTOF10: 10

## 2021-07-28 NOTE — PROGRESS NOTES
Shift assessment completed. Pt A&Ox4, VSS on RA. Pt c/o 7/10 generalized pain, PRN PO pain medication given per MAR. Pt is on telemetry and continuous pulse oximetry. Denies any further needs at this time. Bed locked and in lowest position. Call light & bedside table are within reach.

## 2021-07-28 NOTE — PROGRESS NOTES
Hospitalist Progress Note      PCP: Jayne Skinner MD    Date of Admission: 7/24/2021       Chief Complaint:   Right Arm/shoulder pain        Hospital Course: 24 y. o. male with hx of sickle cell anemia who presented to [facility] with  Acute onset of right upper arm/shoulder pain and is being treated for suspected sickle cell crisis. Hemonc consulted.     Subjective:    Pain is controlled, had T-max 102.3 last night  Has been having low-grade fever previous night onwards  Denies cough sputum, headache, change in mental status, abdominal pain nausea vomiting diarrhea or urinary complaints      Medications:  Reviewed    Infusion Medications    sodium chloride 125 mL/hr at 07/28/21 1042    sodium chloride      sodium chloride       Scheduled Medications    vancomycin  1,250 mg Intravenous Q12H    cefepime  2,000 mg Intravenous Q12H    ketorolac  30 mg Intravenous Q6H    pantoprazole  40 mg Oral QAM AC    sodium chloride flush  5-40 mL Intravenous 2 times per day    enoxaparin  40 mg Subcutaneous Daily    hydroxyurea  1,000 mg Oral BID    oxyCODONE  20 mg Oral 2 times per day     PRN Meds: oxyCODONE, budesonide-formoterol, sodium chloride, sodium chloride flush, sodium chloride, ondansetron **OR** ondansetron, polyethylene glycol, acetaminophen **OR** acetaminophen, albuterol sulfate HFA      Intake/Output Summary (Last 24 hours) at 7/28/2021 1157  Last data filed at 7/28/2021 0447  Gross per 24 hour   Intake 2177.69 ml   Output 1650 ml   Net 527.69 ml       Physical Exam Performed:    /65   Pulse 78   Temp 98.7 °F (37.1 °C) (Oral)   Resp 16   Ht 5' 8\" (1.727 m)   Wt 191 lb (86.6 kg)   SpO2 96%   BMI 29.04 kg/m²     General appearance: No apparent distress, appears stated age and cooperative. HEENT: Pupils equal,. Conjunctivae/corneas clear. Neck: Supple, with full range of motion. No jugular venous distention. Trachea midline. Respiratory:  Normal respiratory effort.  Clear to auscultation, bilaterally without Rales/Wheezes/Rhonchi. Cardiovascular: Regular rate and rhythm with normal S1/S2 without murmurs, rubs or gallops. Abdomen: Soft, non-tender, non-distended with normal bowel sounds. Musculoskeletal: No clubbing, cyanosis or edema bilaterally. Full range of motion without deformity. Skin: Skin color, texture, turgor normal.  No rashes or lesions. Neurologic:  Neurovascularly intact without any focal sensory/motor deficits. l.  Psychiatric: Alert and oriented, thought content appropriate, normal insight  Capillary Refill: Brisk,3 seconds, normal   Peripheral Pulses: +2 palpable, equal bilaterally       Labs:   Recent Labs     07/27/21  1008 07/28/21  1023   WBC 21.6* 18.2*   HGB 8.2* 8.1*   HCT 23.8* 23.9*    301     Recent Labs     07/27/21  1008 07/28/21  0913    138   K 4.1 4.7    104   CO2 21 18*   BUN 13 13   CREATININE 0.7* 0.8*   CALCIUM 9.3 9.4     Recent Labs     07/27/21  1008   AST 36   ALT 26   BILIDIR 0.8*   BILITOT 5.2*   ALKPHOS 197*     No results for input(s): INR in the last 72 hours. No results for input(s): Luz Basques in the last 72 hours. Urinalysis:      Lab Results   Component Value Date    NITRU Negative 06/09/2021    WBCUA None seen 06/09/2021    BACTERIA Rare 06/09/2021    RBCUA 0-2 06/09/2021    BLOODU TRACE-INTACT 06/09/2021    SPECGRAV 1.010 06/09/2021    GLUCOSEU Negative 06/09/2021       Radiology:  XR LUMBAR SPINE (2-3 VIEWS)   Final Result   No acute osseous abnormality. XR KNEE RIGHT (1-2 VIEWS)   Final Result   Unremarkable appearance of both knees         XR KNEE LEFT (1-2 VIEWS)   Final Result   Unremarkable appearance of both knees         XR CHEST PORTABLE   Final Result   No acute process. Assessment/Plan:    Active Hospital Problems    Diagnosis     Sickle cell crisis ([facility] [address] [D57.00]           Rigiht arm/shoulder pain- with concern for Sickle cell pain crisis.   - per emr, this will be his 6th presentation since beginning of May 2021.    -s /p 2u pRBCs on 7/24/21.   -hemonc consulted and managing pain and following  -continued ivfs, hydroxyurea  -previously pt was sent on oxycodone 10 mg prescription through the [facility] EMR.   Continued home chronic pain meds   -there have been concerns for developing addiction behavior/misuse of opiods in the recent past, hemonc aware and recommended that pt arrange f/u with pain mgmt  -given scheduled toradol 30mg q8h for 9 doses   -pt noted back/knee pain on 7/26, unclear if this is also crisis related, xrays ordered by hemonc which are negative  Pain is much controlled, PCA pump was discontinued and currently on p.o. medication      Sickle cell anemia - followed and transfused per Heme/Onc.   - s/p 2u prbc   -Hemoglobin stable     Asthma - controlled on home inhaled bronchodilators - continued. .    High-grade fever and leukocytosis with elevated procalcitonin-not quite sure about the cause-septic work-up blood culture urine culture, broad-spectrum antibiotics Vanco and cefepime, pharmacy to dose  Discussed with hematologist, agreed with plan     DVT Prophylaxis: lovenox  Diet: ADULT DIET;  Regular  Code Status: Full Code    PT/OT Eval Status: not needed    Dispo -1 to 2 days if cultures negative and no signs of sepsis or infection  Jocy Hill MD

## 2021-07-28 NOTE — PROGRESS NOTES
ONCOLOGY HEMATOLOGY CARE PROGRESS NOTE      SUBJECTIVE:     The patient states he feels well would like to go home. ROS:     Constitutional:  No weight loss, No fever, No chills, No night sweats. Energy level good. Eyes:  No impairment or change in vision  ENT / Mouth:  No pain, abnormal ulceration, bleeding, nasal drip or change in voice or hearing  Cardiovascular:  No chest pain, palpitations, new edema, or calf discomfort  Respiratory:  No pain, hemoptysis, change to breathing  Breast:  No pain, discharge, change in appearance or texture  Gastrointestinal:  No pain, cramping, jaundice, change to eating and bowel habits  Urinary:  No pain, bleeding or change in continence  Genitalia: No pain, bleeding or discharge  Musculoskeletal:  see HPI above   Skin:  No pruritus, rash, change to nodules or lesions  Neurologic:  No discomfort, change in mental status, speech, sensory or motor activity  Psychiatric:  No change in concentration or change to affect or mood  Endocrine:  No hot flashes, increased thirst, or change to urine production  Hematologic: No petechiae, ecchymosis or bleeding  Lymphatic:  No lymphadenopathy or lymphedema  Allergy / Immunologic:  No eczema, hives, frequent or recurrent infections    OBJECTIVE        Physical    VITALS:  /65   Pulse 78   Temp 98.7 °F (37.1 °C) (Oral)   Resp 16   Ht 5' 8\" (1.727 m)   Wt 191 lb (86.6 kg)   SpO2 96%   BMI 29.04 kg/m²   TEMPERATURE:  Current - Temp: 98.7 °F (37.1 °C);  Max - Temp  Av.5 °F (37.5 °C)  Min: 98.2 °F (36.8 °C)  Max: 102.3 °F (39.1 °C)  PULSE OXIMETRY RANGE: SpO2  Av.8 %  Min: 96 %  Max: 98 %  24HR INTAKE/OUTPUT:      Intake/Output Summary (Last 24 hours) at 2021 0909  Last data filed at 2021 0447  Gross per 24 hour   Intake 2177.69 ml   Output 2150 ml   Net 27.69 ml       CONSTITUTIONAL: awake, alert, cooperative, no apparent distress   EYES: pupils equal, round and reactive to light, sclera clear and conjunctiva normal  ENT: Normocephalic, without obvious abnormality, atraumatic  NECK: supple, symmetrical, no jugular venous distension and no carotid bruits   HEMATOLOGIC/LYMPHATIC: no cervical, supraclavicular or axillary lymphadenopathy   LUNGS: no increased work of breathing and clear to auscultation   CARDIOVASCULAR: regular rate and rhythm, normal S1 and S2, no murmur noted  ABDOMEN: normal bowel sounds x 4, soft, non-distended, non-tender, no masses palpated, no hepatosplenomgaly   MUSCULOSKELETAL: full range of motion noted, tone is normal  NEUROLOGIC: awake, alert, oriented to name, place and time. Motor skills grossly intact. SKIN: Normal skin color, texture, turgor and no jaundice.  appears intact   EXTREMITIES: no LE edema       Data      Recent Labs     07/27/21  1008   WBC 21.6*   HGB 8.2*   HCT 23.8*      MCV 97.4        Recent Labs     07/27/21  1008      K 4.1      CO2 21   BUN 13   CREATININE 0.7*     Recent Labs     07/27/21  1008   AST 36   ALT 26   BILIDIR 0.8*   BILITOT 5.2*   ALKPHOS 197*       Magnesium:    Lab Results   Component Value Date    MG 1.80 05/05/2021    MG 1.80 05/04/2021    MG 1.90 01/12/2021         Problem List  Patient Active Problem List   Diagnosis    Sickle cell pain crisis ([facility] [address]    Asthma    Sickle cell crisis ([facility] [address]    Sepsis ([facility] [address]    Opiate overdose ([facility] [address]    Opiate antagonist poisoning, accidental or unintentional, initial encounter    Leukocytosis    Hypoxia    Anemia    Other chest pain    Pneumonia due to infectious organism    Fever    Chronic prescription opiate use       ASSESSMENT AND PLAN:    Sickle cell crisis:  -He is currently on OxyContin 20 mg p.o. every 12  -Oxycodone 10 mg p.o. every 4 as needed- stop this while on Demand only PCA   -Dilaudid 2 mg IV every 4 as needed- stopped 7/26 to change to PCA   -He is also receiving Toradol 30 mg IV every 8 hours- change to every 6 hours for the next 3 days   -XRAY L spine and bilateral knees looking for infarction   - Dex 10 mg IV X 1 today for pain as well. PPI added   - Hgb 8.9 ; repeat in AM   - Start IV hydration as well today with D5 0.45 NS     Fever: The patient had a one-time fever check with a temperature of around 102. -His white blood count is elevated  -Clinically he has no symptoms and there is no evidence of sepsis. -He was started on cefepime/vancomycin  -He was given Tylenol and his fever dissipated.  -Cultures are ordered        ONCOLOGIC DISPOSITION:  -The patient feels that he can go home today, but unfortunately he has had a fever  -His narcotics have been called into the hospital pharmacy  -He will likely stay just to make sure that he is not septic.     Jayne Skinner MD  May be reached through [address] [place] [address]

## 2021-07-28 NOTE — CONSULTS
Pharmacy Note  Vancomycin Consult    Dilcia Sebastian is a 24 y.o. male started on Vancomycin for Immunocomp Host Prophylaxis; fever in sickle crisis. Consult received from Dr. Bill France to manage therapy. Also receiving the following antibiotics: Cefepime. Allergies:  Morphine     Recent Labs     07/27/21  1008   CREATININE 0.7*     Recent Labs     07/27/21  1008   WBC 21.6*     Estimated Creatinine Clearance: 177 mL/min (A) (based on SCr of 0.7 mg/dL (L)). Intake/Output Summary (Last 24 hours) at 7/28/2021 0827  Last data filed at 7/28/2021 0447  Gross per 24 hour   Intake 2177.69 ml   Output 2150 ml   Net 27.69 ml     Wt Readings from Last 1 Encounters:   07/28/21 191 lb (86.6 kg)       Body mass index is 29.04 kg/m². Culture Date      Source                       Results  none    Loading dose (critically ill or in ICU, require dialysis or renal replacement therapy): Vancomycin 25 mg/kg IVPB x 1 (maximum 3000 mg). Maintenance dose: 15 mg/kg (maximum: 2000 mg/dose and 4500 mg/day) starting at the next dosing interval determined by renal function  Pulse dose: fluctuating renal function, ROSAURA, ESRD   Goal Vancomycin trough: 10-15 mcg/mL or 15-20 mcg/mL   Goal Vancomycin AUC: 400-600     Assessment/Plan:  Will initiate Vancomycin with a one time loading dose of  1250 mg x1, followed by 1250 mg IV every 12 hours. Calculated . Vancomycin trough ordered for 7/29 AM. Timing of trough level will be determined based on culture results, renal function, and clinical response. Thank you for the consult.   Dilip Chavarria PharmD 07/28/21 8:39 AM    7/29/21  Vancomycin Day: 2  Current Dosing: Updated to Vancomycin 1,500 mg Q12H  Vanc Random: 7/29/21 0631 = 8.2  Updated Projected AUC: Gildardo Cobb PharmD    7/29/2021 9:44 AM

## 2021-07-29 ENCOUNTER — APPOINTMENT (OUTPATIENT)
Dept: GENERAL RADIOLOGY | Age: 21
DRG: 812 | End: 2021-07-29
Payer: COMMERCIAL

## 2021-07-29 LAB
ABO/RH: NORMAL
ANION GAP SERPL CALCULATED.3IONS-SCNC: 11 MMOL/L (ref 3–16)
ANTIBODY SCREEN: NORMAL
BASOPHILS ABSOLUTE: 0.1 K/UL (ref 0–0.2)
BASOPHILS RELATIVE PERCENT: 0.5 %
BUN BLDV-MCNC: 12 MG/DL (ref 7–20)
CALCIUM SERPL-MCNC: 9.3 MG/DL (ref 8.3–10.6)
CHLORIDE BLD-SCNC: 104 MMOL/L (ref 99–110)
CO2: 20 MMOL/L (ref 21–32)
CREAT SERPL-MCNC: 0.8 MG/DL (ref 0.9–1.3)
EOSINOPHILS ABSOLUTE: 0.1 K/UL (ref 0–0.6)
EOSINOPHILS RELATIVE PERCENT: 0.4 %
GFR AFRICAN AMERICAN: >60
GFR NON-AFRICAN AMERICAN: >60
GLUCOSE BLD-MCNC: 79 MG/DL (ref 70–99)
HCT VFR BLD CALC: 21.1 % (ref 40.5–52.5)
HCT VFR BLD CALC: 21.1 % (ref 40.5–52.5)
HEMOGLOBIN: 7.2 G/DL (ref 13.5–17.5)
HEMOGLOBIN: 7.2 G/DL (ref 13.5–17.5)
LYMPHOCYTES ABSOLUTE: 3.1 K/UL (ref 1–5.1)
LYMPHOCYTES RELATIVE PERCENT: 17.3 %
MCH RBC QN AUTO: 33.5 PG (ref 26–34)
MCHC RBC AUTO-ENTMCNC: 34.2 G/DL (ref 31–36)
MCV RBC AUTO: 97.9 FL (ref 80–100)
MONOCYTES ABSOLUTE: 1.4 K/UL (ref 0–1.3)
MONOCYTES RELATIVE PERCENT: 7.6 %
NEUTROPHILS ABSOLUTE: 13.3 K/UL (ref 1.7–7.7)
NEUTROPHILS RELATIVE PERCENT: 74.2 %
PDW BLD-RTO: 19.6 % (ref 12.4–15.4)
PLATELET # BLD: 302 K/UL (ref 135–450)
PMV BLD AUTO: 8.8 FL (ref 5–10.5)
POTASSIUM REFLEX MAGNESIUM: 4.1 MMOL/L (ref 3.5–5.1)
RBC # BLD: 2.16 M/UL (ref 4.2–5.9)
SODIUM BLD-SCNC: 135 MMOL/L (ref 136–145)
URINE CULTURE, ROUTINE: NORMAL
VANCOMYCIN RANDOM: 8.2 UG/ML
WBC # BLD: 17.9 K/UL (ref 4–11)

## 2021-07-29 PROCEDURE — 36430 TRANSFUSION BLD/BLD COMPNT: CPT

## 2021-07-29 PROCEDURE — 2580000003 HC RX 258: Performed by: INTERNAL MEDICINE

## 2021-07-29 PROCEDURE — 6360000002 HC RX W HCPCS: Performed by: INTERNAL MEDICINE

## 2021-07-29 PROCEDURE — 2580000003 HC RX 258: Performed by: NURSE PRACTITIONER

## 2021-07-29 PROCEDURE — 6370000000 HC RX 637 (ALT 250 FOR IP): Performed by: INTERNAL MEDICINE

## 2021-07-29 PROCEDURE — 86900 BLOOD TYPING SEROLOGIC ABO: CPT

## 2021-07-29 PROCEDURE — 6360000002 HC RX W HCPCS: Performed by: NURSE PRACTITIONER

## 2021-07-29 PROCEDURE — 85014 HEMATOCRIT: CPT

## 2021-07-29 PROCEDURE — 73000 X-RAY EXAM OF COLLAR BONE: CPT

## 2021-07-29 PROCEDURE — 1200000000 HC SEMI PRIVATE

## 2021-07-29 PROCEDURE — 80048 BASIC METABOLIC PNL TOTAL CA: CPT

## 2021-07-29 PROCEDURE — 86923 COMPATIBILITY TEST ELECTRIC: CPT

## 2021-07-29 PROCEDURE — 85025 COMPLETE CBC W/AUTO DIFF WBC: CPT

## 2021-07-29 PROCEDURE — P9016 RBC LEUKOCYTES REDUCED: HCPCS

## 2021-07-29 PROCEDURE — 85018 HEMOGLOBIN: CPT

## 2021-07-29 PROCEDURE — 80202 ASSAY OF VANCOMYCIN: CPT

## 2021-07-29 PROCEDURE — 86850 RBC ANTIBODY SCREEN: CPT

## 2021-07-29 PROCEDURE — 36415 COLL VENOUS BLD VENIPUNCTURE: CPT

## 2021-07-29 PROCEDURE — 86901 BLOOD TYPING SEROLOGIC RH(D): CPT

## 2021-07-29 PROCEDURE — 6370000000 HC RX 637 (ALT 250 FOR IP): Performed by: NURSE PRACTITIONER

## 2021-07-29 RX ORDER — HYDROMORPHONE HCL 110MG/55ML
0.5 PATIENT CONTROLLED ANALGESIA SYRINGE INTRAVENOUS EVERY 12 HOURS PRN
Status: DISCONTINUED | OUTPATIENT
Start: 2021-07-29 | End: 2021-07-30 | Stop reason: HOSPADM

## 2021-07-29 RX ORDER — HYDROMORPHONE HCL 110MG/55ML
1 PATIENT CONTROLLED ANALGESIA SYRINGE INTRAVENOUS EVERY 4 HOURS PRN
Status: DISCONTINUED | OUTPATIENT
Start: 2021-07-29 | End: 2021-07-29

## 2021-07-29 RX ORDER — SODIUM CHLORIDE 9 MG/ML
INJECTION, SOLUTION INTRAVENOUS CONTINUOUS
Status: DISCONTINUED | OUTPATIENT
Start: 2021-07-29 | End: 2021-07-29

## 2021-07-29 RX ORDER — SODIUM CHLORIDE 9 MG/ML
INJECTION, SOLUTION INTRAVENOUS PRN
Status: DISCONTINUED | OUTPATIENT
Start: 2021-07-29 | End: 2021-07-30 | Stop reason: HOSPADM

## 2021-07-29 RX ADMIN — KETOROLAC TROMETHAMINE 30 MG: 30 INJECTION, SOLUTION INTRAMUSCULAR; INTRAVENOUS at 02:56

## 2021-07-29 RX ADMIN — SODIUM CHLORIDE: 9 INJECTION, SOLUTION INTRAVENOUS at 22:42

## 2021-07-29 RX ADMIN — OXYCODONE 10 MG: 5 TABLET ORAL at 01:14

## 2021-07-29 RX ADMIN — OXYCODONE 10 MG: 5 TABLET ORAL at 05:48

## 2021-07-29 RX ADMIN — SODIUM CHLORIDE, PRESERVATIVE FREE 10 ML: 5 INJECTION INTRAVENOUS at 22:01

## 2021-07-29 RX ADMIN — VANCOMYCIN HYDROCHLORIDE 1500 MG: 10 INJECTION, POWDER, LYOPHILIZED, FOR SOLUTION INTRAVENOUS at 11:26

## 2021-07-29 RX ADMIN — PANTOPRAZOLE SODIUM 40 MG: 40 TABLET, DELAYED RELEASE ORAL at 10:15

## 2021-07-29 RX ADMIN — HYDROXYUREA 1000 MG: 500 CAPSULE ORAL at 10:16

## 2021-07-29 RX ADMIN — CEFEPIME HYDROCHLORIDE 2000 MG: 2 INJECTION, POWDER, FOR SOLUTION INTRAVENOUS at 10:21

## 2021-07-29 RX ADMIN — OXYCODONE HYDROCHLORIDE 20 MG: 20 TABLET, FILM COATED, EXTENDED RELEASE ORAL at 22:00

## 2021-07-29 RX ADMIN — OXYCODONE 10 MG: 5 TABLET ORAL at 22:00

## 2021-07-29 RX ADMIN — SODIUM CHLORIDE: 9 INJECTION, SOLUTION INTRAVENOUS at 02:57

## 2021-07-29 RX ADMIN — ACETAMINOPHEN 650 MG: 325 TABLET ORAL at 18:58

## 2021-07-29 RX ADMIN — OXYCODONE 10 MG: 5 TABLET ORAL at 15:55

## 2021-07-29 RX ADMIN — HYDROXYUREA 1000 MG: 500 CAPSULE ORAL at 23:28

## 2021-07-29 RX ADMIN — CEFEPIME HYDROCHLORIDE 2000 MG: 2 INJECTION, POWDER, FOR SOLUTION INTRAVENOUS at 23:27

## 2021-07-29 RX ADMIN — KETOROLAC TROMETHAMINE 30 MG: 30 INJECTION, SOLUTION INTRAMUSCULAR; INTRAVENOUS at 10:12

## 2021-07-29 RX ADMIN — OXYCODONE HYDROCHLORIDE 20 MG: 20 TABLET, FILM COATED, EXTENDED RELEASE ORAL at 11:27

## 2021-07-29 RX ADMIN — OXYCODONE 10 MG: 5 TABLET ORAL at 10:12

## 2021-07-29 RX ADMIN — SODIUM CHLORIDE, PRESERVATIVE FREE 10 ML: 5 INJECTION INTRAVENOUS at 10:17

## 2021-07-29 RX ADMIN — DEXAMETHASONE SODIUM PHOSPHATE 12 MG: 4 INJECTION, SOLUTION INTRAMUSCULAR; INTRAVENOUS at 15:29

## 2021-07-29 ASSESSMENT — PAIN SCALES - GENERAL
PAINLEVEL_OUTOF10: 7
PAINLEVEL_OUTOF10: 10
PAINLEVEL_OUTOF10: 10
PAINLEVEL_OUTOF10: 8
PAINLEVEL_OUTOF10: 7
PAINLEVEL_OUTOF10: 7
PAINLEVEL_OUTOF10: 9
PAINLEVEL_OUTOF10: 7
PAINLEVEL_OUTOF10: 7
PAINLEVEL_OUTOF10: 6

## 2021-07-29 ASSESSMENT — PAIN DESCRIPTION - PAIN TYPE: TYPE: ACUTE PAIN

## 2021-07-29 ASSESSMENT — PAIN DESCRIPTION - FREQUENCY: FREQUENCY: CONTINUOUS

## 2021-07-29 ASSESSMENT — PAIN DESCRIPTION - ORIENTATION: ORIENTATION: RIGHT

## 2021-07-29 ASSESSMENT — PAIN DESCRIPTION - DESCRIPTORS: DESCRIPTORS: ACHING;CONSTANT

## 2021-07-29 ASSESSMENT — PAIN DESCRIPTION - LOCATION: LOCATION: ARM;OTHER (COMMENT)

## 2021-07-29 NOTE — PROGRESS NOTES
Hospitalist Progress Note      PCP: Singh Escudero MD    Date of Admission: 7/24/2021       Chief Complaint:   Right Arm/shoulder pain        Hospital Course: 24 y. o. male with hx of sickle cell anemia who presented to [facility] with  Acute onset of right upper arm/shoulder pain and is being treated for suspected sickle cell crisis. Hemonc consulted.     Subjective:    Pain is controlled,  Fever improved, low-grade  Complain increasing collarbone pain and not feeling that great  Denies cough sputum, headache, change in mental status, abdominal pain nausea vomiting diarrhea or urinary complaints      Medications:  Reviewed    Infusion Medications    sodium chloride      sodium chloride      sodium chloride 125 mL/hr at 07/29/21 0257    sodium chloride      sodium chloride       Scheduled Medications    vancomycin  1,500 mg Intravenous Q12H    dexamethasone  12 mg Intravenous Once    cefepime  2,000 mg Intravenous Q12H    pantoprazole  40 mg Oral QAM AC    sodium chloride flush  5-40 mL Intravenous 2 times per day    enoxaparin  40 mg Subcutaneous Daily    hydroxyurea  1,000 mg Oral BID    oxyCODONE  20 mg Oral 2 times per day     PRN Meds: sodium chloride, HYDROmorphone, oxyCODONE, budesonide-formoterol, sodium chloride, sodium chloride flush, sodium chloride, ondansetron **OR** ondansetron, polyethylene glycol, acetaminophen **OR** acetaminophen, albuterol sulfate HFA      Intake/Output Summary (Last 24 hours) at 7/29/2021 1240  Last data filed at 7/29/2021 0403  Gross per 24 hour   Intake 4154.2 ml   Output 2000 ml   Net 2154.2 ml       Physical Exam Performed:    /64   Pulse 109   Temp 98.7 °F (37.1 °C) (Oral)   Resp 18   Ht 5' 8\" (1.727 m)   Wt 194 lb 1.6 oz (88 kg)   SpO2 98%   BMI 29.51 kg/m²     General appearance: Complaining pain, appears stated age and cooperative. HEENT: Pupils equal,. Conjunctivae/corneas clear. Neck: Supple, with full range of motion.  No jugular venous distention. Trachea midline. Respiratory:  Normal respiratory effort. Clear to auscultation, bilaterally without Rales/Wheezes/Rhonchi. Cardiovascular: Regular rate and rhythm with normal S1/S2 without murmurs, rubs or gallops. Abdomen: Soft, non-tender, non-distended with normal bowel sounds. Musculoskeletal: No clubbing, cyanosis or edema bilaterally. Full range of motion without deformity. Skin: Skin color, texture, turgor normal.  No rashes or lesions. Neurologic:  Neurovascularly intact without any focal sensory/motor deficits. l.  Psychiatric: Alert and oriented, thought content appropriate, normal insight  Capillary Refill: Brisk,3 seconds, normal   Peripheral Pulses: +2 palpable, equal bilaterally       Labs:   Recent Labs     07/27/21  1008 07/28/21  1023 07/29/21  0631   WBC 21.6* 18.2* 17.9*   HGB 8.2* 8.1* 7.2*   HCT 23.8* 23.9* 21.1*    301 302     Recent Labs     07/27/21  1008 07/28/21  0913 07/29/21  0631    138 135*   K 4.1 4.7 4.1    104 104   CO2 21 18* 20*   BUN 13 13 12   CREATININE 0.7* 0.8* 0.8*   CALCIUM 9.3 9.4 9.3     Recent Labs     07/27/21  1008   AST 36   ALT 26   BILIDIR 0.8*   BILITOT 5.2*   ALKPHOS 197*     No results for input(s): INR in the last 72 hours. No results for input(s): Murtaza Beery in the last 72 hours. Urinalysis:      Lab Results   Component Value Date    NITRU Negative 06/09/2021    WBCUA None seen 06/09/2021    BACTERIA Rare 06/09/2021    RBCUA 0-2 06/09/2021    BLOODU TRACE-INTACT 06/09/2021    SPECGRAV 1.010 06/09/2021    GLUCOSEU Negative 06/09/2021       Radiology:  XR CLAVICLE RIGHT   Final Result   No acute clavicle abnormality. XR LUMBAR SPINE (2-3 VIEWS)   Final Result   No acute osseous abnormality.          XR KNEE RIGHT (1-2 VIEWS)   Final Result   Unremarkable appearance of both knees         XR KNEE LEFT (1-2 VIEWS)   Final Result   Unremarkable appearance of both knees         XR CHEST PORTABLE Final Result   No acute process. Assessment/Plan:    Active Hospital Problems    Diagnosis     Sickle cell crisis ([facility] [address] [D57.00]           Rigiht arm/shoulder pain- with concern for Sickle cell pain crisis.   - per emr, this will be his 6th presentation since beginning of May 2021.    -s /p 2u pRBCs on 7/24/21.   -hemonc consulted and managing pain and following  -continued ivfs, hydroxyurea  -previously pt was sent on oxycodone 10 mg prescription through the [facility] EMR.   Continued home chronic pain meds   -there have been concerns for developing addiction behavior/misuse of opiods in the recent past, hemonc aware and recommended that pt arrange f/u with pain mgmt  -given scheduled toradol 30mg q8h for 9 doses   -pt noted back/knee pain on 7/26, unclear if this is also crisis related, xrays ordered by hemon which are negative  Pain is much controlled, PCA pump was discontinued  7/27   and currently on p.o. medication  Complain of worsening collarbone pain this morning  Was started on IV fluids and IV Dilaudid as needed  X-ray collarbone ordered by hematologist      Sickle cell anemia - followed and transfused per Heme/Onc.   - s/p 2u prbc   -Hemoglobin dropped  Repeat H&H, PRBC as needed     Asthma - controlled on home inhaled bronchodilators - continued. .    High-grade fever and leukocytosis with elevated procalcitonin-not quite sure about the cause-septic work-up blood culture urine culture, septic work-up negative so far, broad-spectrum antibiotics Vanco and cefepime, pharmacy to dose  Discussed with hematologist, agreed with plan     DVT Prophylaxis: lovenox  Diet: ADULT DIET;  Regular  Code Status: Full Code    PT/OT Eval Status: not needed    Dispo -1 to 2 days if cultures negative and no signs of sepsis /sickle cell crisis/control of pain  Bertrand Chong MD

## 2021-07-29 NOTE — PLAN OF CARE
Problem: Pain:  Goal: Control of chronic pain  Description: Control of chronic pain  7/29/2021 0022 by Genna Martínez RN  Outcome: Ongoing  7/28/2021 1931 by Jose Odell RN  Outcome: Ongoing     Problem: Falls - Risk of:  Goal: Will remain free from falls  Description: Will remain free from falls  7/29/2021 0022 by Genna Martínez RN  Outcome: Ongoing  7/28/2021 1931 by Jose Odell RN  Outcome: Ongoing

## 2021-07-29 NOTE — PROGRESS NOTES
ONCOLOGY HEMATOLOGY CARE PROGRESS NOTE      SUBJECTIVE:     He is having R clavicle pain that is sore to touch. He is asking for additional IVP Dilaudid. Hgb 7.1.         ROS:     Constitutional:  No weight loss, No fever, No chills, No night sweats. Energy level good. Eyes:  No impairment or change in vision  ENT / Mouth:  No pain, abnormal ulceration, bleeding, nasal drip or change in voice or hearing  Cardiovascular:  No chest pain, palpitations, new edema, or calf discomfort  Respiratory:  No pain, hemoptysis, change to breathing  Breast:  No pain, discharge, change in appearance or texture  Gastrointestinal:  No pain, cramping, jaundice, change to eating and bowel habits  Urinary:  No pain, bleeding or change in continence  Genitalia: No pain, bleeding or discharge  Musculoskeletal:  see HPI above   Skin:  No pruritus, rash, change to nodules or lesions  Neurologic:  No discomfort, change in mental status, speech, sensory or motor activity  Psychiatric:  No change in concentration or change to affect or mood  Endocrine:  No hot flashes, increased thirst, or change to urine production  Hematologic: No petechiae, ecchymosis or bleeding  Lymphatic:  No lymphadenopathy or lymphedema  Allergy / Immunologic:  No eczema, hives, frequent or recurrent infections    OBJECTIVE        Physical    VITALS:  /64   Pulse 109   Temp 98.7 °F (37.1 °C) (Oral)   Resp 18   Ht 5' 8\" (1.727 m)   Wt 194 lb 1.6 oz (88 kg)   SpO2 98%   BMI 29.51 kg/m²   TEMPERATURE:  Current - Temp: 98.7 °F (37.1 °C);  Max - Temp  Av °F (37.2 °C)  Min: 98.3 °F (36.8 °C)  Max: 99.9 °F (37.7 °C)  PULSE OXIMETRY RANGE: SpO2  Av.3 %  Min: 98 %  Max: 99 %  24HR INTAKE/OUTPUT:      Intake/Output Summary (Last 24 hours) at 2021 1220  Last data filed at 2021 0403  Gross per 24 hour   Intake 4154.2 ml   Output 2000 ml   Net 2154.2 ml       CONSTITUTIONAL: awake, alert, cooperative, no apparent distress   EYES: pupils equal, round and reactive to light, sclera clear and conjunctiva normal  ENT: Normocephalic, without obvious abnormality, atraumatic  NECK: supple, symmetrical, no jugular venous distension and no carotid bruits , R clavicle without any physical abnormality on palpation ; pain with palpation. HEMATOLOGIC/LYMPHATIC: no cervical, supraclavicular or axillary lymphadenopathy   LUNGS: no increased work of breathing and clear to auscultation   CARDIOVASCULAR: regular rate and rhythm, normal S1 and S2, no murmur noted  ABDOMEN: normal bowel sounds x 4, soft, non-distended, non-tender, no masses palpated, no hepatosplenomgaly   MUSCULOSKELETAL: full range of motion noted, tone is normal  NEUROLOGIC: awake, alert, oriented to name, place and time. Motor skills grossly intact. SKIN: Normal skin color, texture, turgor and no jaundice.  appears intact   EXTREMITIES: no LE edema       Data      Recent Labs     07/27/21  1008 07/28/21  1023 07/29/21  0631   WBC 21.6* 18.2* 17.9*   HGB 8.2* 8.1* 7.2*   HCT 23.8* 23.9* 21.1*    301 302   MCV 97.4 95.8 97.9        Recent Labs     07/27/21  1008 07/28/21  0913 07/29/21  0631    138 135*   K 4.1 4.7 4.1    104 104   CO2 21 18* 20*   BUN 13 13 12   CREATININE 0.7* 0.8* 0.8*     Recent Labs     07/27/21  1008   AST 36   ALT 26   BILIDIR 0.8*   BILITOT 5.2*   ALKPHOS 197*       Magnesium:    Lab Results   Component Value Date    MG 1.80 05/05/2021    MG 1.80 05/04/2021    MG 1.90 01/12/2021         Problem List  Patient Active Problem List   Diagnosis    [facility] [address]    Asthma    Sick[facility] crisis ([facility] [address]    Sepsis ([facility] [address]    Opiate overdose ([facility] [address]    Opiate antagonist poisoning, accidental or unintentional, initial encounter    Leukocytosis    Hypoxia    Anemia    Other chest pain    Pneumonia due to infectious organism    Fever    Chronic prescription opiate use       ASSESSMENT AND PLAN:    Sickle cell

## 2021-07-29 NOTE — CARE COORDINATION
7/29/21 Pt IPTA, likely no needs from CM, will sign off, please notify CM should any needs arise. Pt here for sickle cell crisis and recently spiked a fever and is staying for pain management also.

## 2021-07-30 VITALS
SYSTOLIC BLOOD PRESSURE: 108 MMHG | OXYGEN SATURATION: 98 % | BODY MASS INDEX: 28.7 KG/M2 | RESPIRATION RATE: 16 BRPM | TEMPERATURE: 98 F | HEIGHT: 68 IN | WEIGHT: 189.4 LBS | HEART RATE: 51 BPM | DIASTOLIC BLOOD PRESSURE: 57 MMHG

## 2021-07-30 LAB
AMPHETAMINES SCREEN BLOOD: NEGATIVE NG/ML
BARBITURATES SCREEN BLOOD: NEGATIVE NG/ML
BENZODIAZEPINES SCREEN BLOOD: NEGATIVE NG/ML
BUPRENORPHINE: NEGATIVE NG/ML
CANNABINOID SCREEN BLOOD: NEGATIVE NG/ML
COCAINE SCREEN BLOOD: NEGATIVE NG/ML
HCT VFR BLD CALC: 26.6 % (ref 40.5–52.5)
HEMOGLOBIN: 9.1 G/DL (ref 13.5–17.5)
Lab: NORMAL
METHADONE SCREEN BLOOD: NEGATIVE NG/ML
METHAMPHETAMINES SERUM/ PLASMA: NEGATIVE NG/ML
OPIATES SCREEN BLOOD: NEGATIVE NG/ML
OXYCODONE: POSITIVE NG/ML
PHENCYCLIDINE SCREEN BLOOD: NEGATIVE NG/ML

## 2021-07-30 PROCEDURE — 6360000002 HC RX W HCPCS: Performed by: INTERNAL MEDICINE

## 2021-07-30 PROCEDURE — 2580000003 HC RX 258: Performed by: INTERNAL MEDICINE

## 2021-07-30 PROCEDURE — 6370000000 HC RX 637 (ALT 250 FOR IP): Performed by: INTERNAL MEDICINE

## 2021-07-30 PROCEDURE — 6370000000 HC RX 637 (ALT 250 FOR IP): Performed by: NURSE PRACTITIONER

## 2021-07-30 RX ADMIN — PANTOPRAZOLE SODIUM 40 MG: 40 TABLET, DELAYED RELEASE ORAL at 05:07

## 2021-07-30 RX ADMIN — VANCOMYCIN HYDROCHLORIDE 1500 MG: 10 INJECTION, POWDER, LYOPHILIZED, FOR SOLUTION INTRAVENOUS at 09:02

## 2021-07-30 RX ADMIN — OXYCODONE 10 MG: 5 TABLET ORAL at 07:50

## 2021-07-30 RX ADMIN — VANCOMYCIN HYDROCHLORIDE 1500 MG: 10 INJECTION, POWDER, LYOPHILIZED, FOR SOLUTION INTRAVENOUS at 00:37

## 2021-07-30 RX ADMIN — SODIUM CHLORIDE, PRESERVATIVE FREE 10 ML: 5 INJECTION INTRAVENOUS at 07:54

## 2021-07-30 RX ADMIN — OXYCODONE 10 MG: 5 TABLET ORAL at 12:14

## 2021-07-30 RX ADMIN — CEFEPIME HYDROCHLORIDE 2000 MG: 2 INJECTION, POWDER, FOR SOLUTION INTRAVENOUS at 07:55

## 2021-07-30 RX ADMIN — HYDROXYUREA 1000 MG: 500 CAPSULE ORAL at 07:50

## 2021-07-30 RX ADMIN — OXYCODONE 10 MG: 5 TABLET ORAL at 02:09

## 2021-07-30 RX ADMIN — OXYCODONE HYDROCHLORIDE 20 MG: 20 TABLET, FILM COATED, EXTENDED RELEASE ORAL at 09:01

## 2021-07-30 ASSESSMENT — PAIN SCALES - GENERAL
PAINLEVEL_OUTOF10: 6
PAINLEVEL_OUTOF10: 7
PAINLEVEL_OUTOF10: 7
PAINLEVEL_OUTOF10: 6
PAINLEVEL_OUTOF10: 6

## 2021-07-30 ASSESSMENT — PAIN - FUNCTIONAL ASSESSMENT: PAIN_FUNCTIONAL_ASSESSMENT: ACTIVITIES ARE NOT PREVENTED

## 2021-07-30 ASSESSMENT — PAIN DESCRIPTION - PAIN TYPE: TYPE: ACUTE PAIN;CHRONIC PAIN

## 2021-07-30 ASSESSMENT — PAIN DESCRIPTION - LOCATION: LOCATION: GENERALIZED

## 2021-07-30 NOTE — PROGRESS NOTES
Pt A&Ox4. VSS. Shift assessment completed. Pain medication administered per orders. Pt would not like his CBC drawn this AM- hard stick. Denies other needs. Call light within reach, bed in lowest position, wheels locked.

## 2021-07-30 NOTE — PROGRESS NOTES
ONCOLOGY HEMATOLOGY CARE PROGRESS NOTE      SUBJECTIVE:     His pain appears to be very well controlled today. He did not ask for any further pain medication. ROS:     Constitutional:  No weight loss, No fever, No chills, No night sweats. Energy level good. Eyes:  No impairment or change in vision  ENT / Mouth:  No pain, abnormal ulceration, bleeding, nasal drip or change in voice or hearing  Cardiovascular:  No chest pain, palpitations, new edema, or calf discomfort  Respiratory:  No pain, hemoptysis, change to breathing  Breast:  No pain, discharge, change in appearance or texture  Gastrointestinal:  No pain, cramping, jaundice, change to eating and bowel habits  Urinary:  No pain, bleeding or change in continence  Genitalia: No pain, bleeding or discharge  Musculoskeletal:  see HPI above   Skin:  No pruritus, rash, change to nodules or lesions  Neurologic:  No discomfort, change in mental status, speech, sensory or motor activity  Psychiatric:  No change in concentration or change to affect or mood  Endocrine:  No hot flashes, increased thirst, or change to urine production  Hematologic: No petechiae, ecchymosis or bleeding  Lymphatic:  No lymphadenopathy or lymphedema  Allergy / Immunologic:  No eczema, hives, frequent or recurrent infections    OBJECTIVE        Physical    VITALS:  BP (!) 108/57   Pulse 51   Temp 98 °F (36.7 °C) (Oral)   Resp 16   Ht 5' 8\" (1.727 m)   Wt 189 lb 6.4 oz (85.9 kg)   SpO2 98%   BMI 28.80 kg/m²   TEMPERATURE:  Current - Temp: 98 °F (36.7 °C);  Max - Temp  Av.1 °F (37.3 °C)  Min: 98 °F (36.7 °C)  Max: 100.6 °F (38.1 °C)  PULSE OXIMETRY RANGE: SpO2  Av.8 %  Min: 98 %  Max: 100 %  24HR INTAKE/OUTPUT:      Intake/Output Summary (Last 24 hours) at 2021 0831  Last data filed at 2021 0755  Gross per 24 hour   Intake 1888.33 ml   Output 3200 ml   Net -1311.67 ml       CONSTITUTIONAL: awake, alert, cooperative, no apparent distress   EYES: pupils equal, round and reactive to light, sclera clear and conjunctiva normal  ENT: Normocephalic, without obvious abnormality, atraumatic  NECK: supple, symmetrical, no jugular venous distension and no carotid bruits , R clavicle without any physical abnormality on palpation ; pain with palpation. HEMATOLOGIC/LYMPHATIC: no cervical, supraclavicular or axillary lymphadenopathy   LUNGS: no increased work of breathing and clear to auscultation   CARDIOVASCULAR: regular rate and rhythm, normal S1 and S2, no murmur noted  ABDOMEN: normal bowel sounds x 4, soft, non-distended, non-tender, no masses palpated, no hepatosplenomgaly   MUSCULOSKELETAL: full range of motion noted, tone is normal  NEUROLOGIC: awake, alert, oriented to name, place and time. Motor skills grossly intact. SKIN: Normal skin color, texture, turgor and no jaundice. appears intact   EXTREMITIES: no LE edema       Data      Recent Labs     07/27/21  1008 07/27/21  1008 07/28/21  1023 07/28/21  1023 07/29/21  0631 07/29/21  1416 07/29/21  2355   WBC 21.6*  --  18.2*  --  17.9*  --   --    HGB 8.2*   < > 8.1*   < > 7.2* 7.2* 9.1*   HCT 23.8*   < > 23.9*   < > 21.1* 21.1* 26.6*     --  301  --  302  --   --    MCV 97.4  --  95.8  --  97.9  --   --     < > = values in this interval not displayed.         Recent Labs     07/27/21  1008 07/28/21  0913 07/29/21  0631    138 135*   K 4.1 4.7 4.1    104 104   CO2 21 18* 20*   BUN 13 13 12   CREATININE 0.7* 0.8* 0.8*     Recent Labs     07/27/21  1008   AST 36   ALT 26   BILIDIR 0.8*   BILITOT 5.2*   ALKPHOS 197*       Magnesium:    Lab Results   Component Value Date    MG 1.80 05/05/2021    MG 1.80 05/04/2021    MG 1.90 01/12/2021         Problem List  Patient Active Problem List   Diagnosis    [facility] crisis ([facility] [address]    Asthma    Sick[facility] cell crisis ([facility] [address]    Sepsis ([facility] [address]    Opiate overdose ([facility] [address]    Opiate antagonist poisoning, accidental or unintentional, initial encounter    Leukocytosis    Hypoxia    Anemia    Other chest pain    Pneumonia due to infectious organism    Fever    Chronic prescription opiate use       ASSESSMENT AND PLAN:    Sickle cell crisis:  -He is currently on OxyContin 20 mg p.o. every 12  -Demand only PCA- stopped 7/28. Oxy IR 10 mg PO every 4 hours PRN   -Dilaudid 2 mg IV every 4 as needed- stopped 7/26 to change to PCA - no plans to resume now   -Completed Toradol    -XRAY L spine and bilateral knees looking for infarction - negative   - IV hydration- decrease rate to 75 cc/hour   - Cont Hydrea 1000 BID   - He was transfused 7/29/2021 and he appears to feel better today    Fever: The patient had a one-time fever check with a temperature of around 102. Fever now low grade at 99.9.   -His white blood count is elevated WBC was 20--> 17   -Clinically he has no symptoms and there is no evidence of sepsis.   -He was started on cefepime/vancomycin, Day 2   -He was given Tylenol and his fever dissipated.  -Cultures are ordered- NGTD   -I doubt that he has sepsis    R clavicle pain   - xray       ONCOLOGIC DISPOSITION:    -Once we are sure he is not septic    Janene Green MD  May be reached through [address] [place] [address]

## 2021-07-30 NOTE — DISCHARGE SUMMARY
Hospital Medicine Discharge Summary    Patient ID: [facility]      Patient's PCP: Wing Osborn MD    Admit Date: 7/24/2021     Discharge Date: 7/30/2021      Admitting Physician: Crissy Ramires MD     Discharge Physician: Joanne Overton MD     Discharge Diagnoses: Active Hospital Problems    Diagnosis     Sickle cell crisis ([address] [D57.00]        The patient was seen and examined on day of discharge and this discharge summary is in conjunction with any daily progress note from day of discharge. Hospital Course:   Rigiht arm/shoulder pain- with concern for Sickle cell pain crisis.   -s /p 2u pRBCs on 7/24/21.   -hemonc consulted and input appreciated  -Condition improved  Home pain medication per hematologist        Asthma - controlled on home inhaled bronchodilators - continued. .     High-grade fever and leukocytosis with elevated procalcitonin-not quite sure about the cause-septic work-up blood culture urine culture, septic work-up negative so far, broad-spectrum antibiotics Vanco and cefepime, pharmacy to dose  Fever subsided septic work-up was negative  Patient did not show any symptoms of infection most probably secondary to sickle cell crisis  Antibiotics were discontinued          Physical Exam Performed:     BP (!) 108/57   Pulse 51   Temp 98 °F (36.7 °C) (Oral)   Resp 16   Ht 5' 8\" (1.727 m)   Wt 189 lb 6.4 oz (85.9 kg)   SpO2 98%   BMI 28.80 kg/m²       General appearance:  No apparent distress, appears stated age and cooperative. HEENT:  Normal cephalic, atraumatic without obvious deformity. Pupils equal, round, and reactive to light. Extra ocular muscles intact. Conjunctivae/corneas clear. Neck: Supple, with full range of motion. No jugular venous distention. Trachea midline. Respiratory:  Normal respiratory effort. Clear to auscultation, bilaterally without Rales/Wheezes/Rhonchi.   Cardiovascular:  Regular rate and rhythm with normal S1/S2 without murmurs, rubs or gallops. Abdomen: Soft, non-tender, non-distended with normal bowel sounds. Musculoskeletal:  No clubbing, cyanosis or edema bilaterally. Full range of motion without deformity. Skin: Skin color, texture, turgor normal.  No rashes or lesions. Neurologic:  Neurovascularly intact without any focal sensory/motor deficits. Cranial nerves: II-XII intact, grossly non-focal.  Psychiatric:  Alert and oriented, thought content appropriate, normal insight  Capillary Refill: Brisk,< 3 seconds   Peripheral Pulses: +2 palpable, equal bilaterally       Labs: For convenience and continuity at follow-up the following most recent labs are provided:      CBC:    Lab Results   Component Value Date    WBC 9.5 08/09/2021    HGB 7.2 08/09/2021    HCT 21.0 08/09/2021    PLT 1,102 08/09/2021       Renal:    Lab Results   Component Value Date     08/09/2021    K 4.0 08/09/2021    K 3.9 08/08/2021     08/09/2021    CO2 25 08/09/2021    BUN 8 08/09/2021    CREATININE 0.7 08/09/2021    CALCIUM 9.1 08/09/2021         Significant Diagnostic Studies    Radiology:   XR CLAVICLE RIGHT   Final Result   No acute clavicle abnormality. XR LUMBAR SPINE (2-3 VIEWS)   Final Result   No acute osseous abnormality. XR KNEE RIGHT (1-2 VIEWS)   Final Result   Unremarkable appearance of both knees         XR KNEE LEFT (1-2 VIEWS)   Final Result   Unremarkable appearance of both knees         XR CHEST PORTABLE   Final Result   No acute process. Consults:     IP CONSULT TO PHARMACY    Disposition: Home    Condition at Discharge: Stable    Discharge Instructions/Follow-up: PCP and hematology    Code Status:  Prior     Activity: activity as tolerated    Diet: regular diet      Discharge Medications:     Discharge Medication List as of 7/30/2021 12:06 PM           Details   oxyCODONE HCl (OXY-IR) 10 MG immediate release tablet Take 10 mg by mouth every 4 hours as needed for Pain. Historical Med      albuterol sulfate HFA (PROAIR HFA) 108 (90 Base) MCG/ACT inhaler Albuterol HFA Inhaler 90 mcg/actuation  inhaled  2 puffs prn     ActiveHistorical Med      mometasone-formoterol (DULERA) 100-5 MCG/ACT inhaler Mometasone-Formoterol HFA Inhaler 100 mcg-5 mcg/actuation  inhaled  2 puffs every am     ActiveHistorical Med      levalbuterol (XOPENEX) 0.31 MG/3ML nebulization Levalbuterol Nebulized    2 puffs prn     ActiveHistorical Med      lactulose (CHRONULAC) 10 GM/15ML solution Take 15 mLs by mouth as needed Historical Med      docusate (COLACE, DULCOLAX) 100 MG CAPS Take 100 mg by mouthHistorical Med      ibuprofen (ADVIL;MOTRIN) 800 MG tablet ibuprofen 800 MG Oral Tablet  oral   prn    ActiveHistorical Med      oxyCODONE (OXYCONTIN) 20 MG extended release tablet TAKE 1 TABLET BY MOUTH EVERY 12 HOURS. TAKE WITH ENOUGH WATER TO SWALLOW WHOLE. DO NOT CRUSH/DISSOLVE/CHEW/CUT/BREAK. Historical Med      hydroxyurea (HYDREA) 500 MG chemo capsule Take 2 capsules by mouth 2 times dailyHistorical Med             Time Spent on discharge is  30 minutes in the examination, evaluation, counseling and review of medications and discharge plan. Signed:    Navi Miller MD   8/11/2021      Thank you Jigna Pemberton MD for the opportunity to be involved in this patient's care. If you have any questions or concerns please feel free to contact me at [phone].

## 2021-07-30 NOTE — PROGRESS NOTES
A/O x4. Calm, in bed. Blood transfusion completed this shift without adverse reactions. Post transfusion H/H 9.1/26.6. C/O pain; medicated with PRN roxicodone and routine oxycontin. No IV pain meds administered this shift. IV Vancomycin and Cefepime as ordered. Afebrile. Call light within reach.

## 2021-07-30 NOTE — PROGRESS NOTES
Pt provided with discharge instructions. All questions answered. Pt is in stable condition. Parents will provide transportation.

## 2021-07-30 NOTE — PROGRESS NOTES
Hospitalist Progress Note      PCP: Jayne Skinner MD    Date of Admission: 7/24/2021       Chief Complaint:   Right Arm/shoulder pain        Hospital Course: 24 y. o. male with hx of sickle cell anemia who presented to [facility] with  Acute onset of right upper arm/shoulder pain and is being treated for suspected sickle cell crisis. Hemonc consulted.     Subjective:    Pain is controlled,  Fever improved, low-grade  Complain increasing collarbone pain and not feeling that great  Denies cough sputum, headache, change in mental status, abdominal pain nausea vomiting diarrhea or urinary complaints      Medications:  Reviewed    Infusion Medications    sodium chloride      sodium chloride 75 mL/hr at 07/29/21 2242    sodium chloride      sodium chloride       Scheduled Medications    vancomycin  1,500 mg Intravenous Q12H    cefepime  2,000 mg Intravenous Q12H    pantoprazole  40 mg Oral QAM AC    sodium chloride flush  5-40 mL Intravenous 2 times per day    enoxaparin  40 mg Subcutaneous Daily    hydroxyurea  1,000 mg Oral BID    oxyCODONE  20 mg Oral 2 times per day     PRN Meds: sodium chloride, HYDROmorphone, oxyCODONE, budesonide-formoterol, sodium chloride, sodium chloride flush, sodium chloride, ondansetron **OR** ondansetron, polyethylene glycol, acetaminophen **OR** acetaminophen, albuterol sulfate HFA      Intake/Output Summary (Last 24 hours) at 7/30/2021 0843  Last data filed at 7/30/2021 0755  Gross per 24 hour   Intake 1888.33 ml   Output 3200 ml   Net -1311.67 ml       Physical Exam Performed:    BP (!) 108/57   Pulse 51   Temp 98 °F (36.7 °C) (Oral)   Resp 16   Ht 5' 8\" (1.727 m)   Wt 189 lb 6.4 oz (85.9 kg)   SpO2 98%   BMI 28.80 kg/m²     General appearance: Complaining pain, appears stated age and cooperative. HEENT: Pupils equal,. Conjunctivae/corneas clear. Neck: Supple, with full range of motion. No jugular venous distention. Trachea midline.   Respiratory: Normal respiratory effort. Clear to auscultation, bilaterally without Rales/Wheezes/Rhonchi. Cardiovascular: Regular rate and rhythm with normal S1/S2 without murmurs, rubs or gallops. Abdomen: Soft, non-tender, non-distended with normal bowel sounds. Musculoskeletal: No clubbing, cyanosis or edema bilaterally. Full range of motion without deformity. Skin: Skin color, texture, turgor normal.  No rashes or lesions. Neurologic:  Neurovascularly intact without any focal sensory/motor deficits. l.  Psychiatric: Alert and oriented, thought content appropriate, normal insight  Capillary Refill: Brisk,3 seconds, normal   Peripheral Pulses: +2 palpable, equal bilaterally       Labs:   Recent Labs     07/27/21  1008 07/27/21  1008 07/28/21  1023 07/28/21  1023 07/29/21  0631 07/29/21  1416 07/29/21  2355   WBC 21.6*  --  18.2*  --  17.9*  --   --    HGB 8.2*   < > 8.1*   < > 7.2* 7.2* 9.1*   HCT 23.8*   < > 23.9*   < > 21.1* 21.1* 26.6*     --  301  --  302  --   --     < > = values in this interval not displayed. Recent Labs     07/27/21  1008 07/28/21  0913 07/29/21  0631    138 135*   K 4.1 4.7 4.1    104 104   CO2 21 18* 20*   BUN 13 13 12   CREATININE 0.7* 0.8* 0.8*   CALCIUM 9.3 9.4 9.3     Recent Labs     07/27/21  1008   AST 36   ALT 26   BILIDIR 0.8*   BILITOT 5.2*   ALKPHOS 197*     No results for input(s): INR in the last 72 hours. No results for input(s): Antimony Pace in the last 72 hours. Urinalysis:      Lab Results   Component Value Date    NITRU Negative 06/09/2021    WBCUA None seen 06/09/2021    BACTERIA Rare 06/09/2021    RBCUA 0-2 06/09/2021    BLOODU TRACE-INTACT 06/09/2021    SPECGRAV 1.010 06/09/2021    GLUCOSEU Negative 06/09/2021       Radiology:  XR CLAVICLE RIGHT   Final Result   No acute clavicle abnormality. XR LUMBAR SPINE (2-3 VIEWS)   Final Result   No acute osseous abnormality.          XR KNEE RIGHT (1-2 VIEWS)   Final Result   Unremarkable appearance of both knees         XR KNEE LEFT (1-2 VIEWS)   Final Result   Unremarkable appearance of both knees         XR CHEST PORTABLE   Final Result   No acute process. Assessment/Plan:    Active Hospital Problems    Diagnosis     Sickle cell crisis ([address] [D57.00]           Rigiht arm/shoulder pain- with concern for Sickle cell pain crisis.   - per emr, this will be his 6th presentation since beginning of May 2021.    -s /p 2u pRBCs on 7/24/21.   -hemonc consulted and managing pain and following  -continued ivfs, hydroxyurea  -previously pt was sent on oxycodone 10 mg prescription through the [facility] EMR.   Continued home chronic pain meds   -there have been concerns for developing addiction behavior/misuse of opiods in the recent past, hemonc aware and recommended that pt arrange f/u with pain mgmt  -given scheduled toradol 30mg q8h for 9 doses   -pt noted back/knee pain on 7/26, unclear if this is also crisis related, xrays ordered by hemonc which are negative  Pain is much controlled, PCA pump was discontinued  7/27   and currently on p.o. medication  Complain of worsening collarbone pain this morning  Was started on IV fluids and IV Dilaudid as needed  X-ray collarbone ordered by hematologist      Sickle cell anemia - followed and transfused per Heme/Onc.   - s/p 2u prbc   -Hemoglobin dropped  Repeat H&H, PRBC as needed     Asthma - controlled on home inhaled bronchodilators - continued. .    High-grade fever and leukocytosis with elevated procalcitonin-not quite sure about the cause-septic work-up blood culture urine culture, septic work-up negative so far, broad-spectrum antibiotics Vanco and cefepime, pharmacy to dose  Discussed with hematologist, agreed with plan     DVT Prophylaxis: lovenox  Diet: ADULT DIET;  Regular  Code Status: Full Code    PT/OT Eval Status: not needed    Dispo -1 to 2 days if cultures negative and no signs of sepsis /sickle cell crisis/control of pain  [employer], MD

## 2021-08-01 LAB — CULTURE, BLOOD 2: NORMAL

## 2021-08-03 LAB
OPIATES, HYDROCODONE: <2 NG/ML
OPIATES, HYDROMORPHONE: 4 NG/ML
OPIATES, OXYCODONE: 31 NG/ML
OPIATES, OXYMORPHONE: <2 NG/ML
OPIATES, SER/ PLASMA CODEINE: <2 NG/ML
OPIATES, SER/PLAS: <2 NG/ML
OPITATES, MORPHINE: <2 NG/ML

## 2021-08-08 ENCOUNTER — HOSPITAL ENCOUNTER (INPATIENT)
Age: 21
LOS: 1 days | Discharge: HOME | DRG: 812 | End: 2021-08-09
Attending: EMERGENCY MEDICINE | Admitting: INTERNAL MEDICINE
Payer: COMMERCIAL

## 2021-08-08 ENCOUNTER — APPOINTMENT (OUTPATIENT)
Dept: GENERAL RADIOLOGY | Age: 21
DRG: 812 | End: 2021-08-08
Payer: COMMERCIAL

## 2021-08-08 DIAGNOSIS — D57.00 SICKLE CELL CRISIS (HCC): Primary | ICD-10-CM

## 2021-08-08 LAB
A/G RATIO: 1 (ref 1.1–2.2)
ALBUMIN SERPL-MCNC: 3.7 G/DL (ref 3.4–5)
ALP BLD-CCNC: 132 U/L (ref 40–129)
ALT SERPL-CCNC: 23 U/L (ref 10–40)
ANION GAP SERPL CALCULATED.3IONS-SCNC: 9 MMOL/L (ref 3–16)
AST SERPL-CCNC: 20 U/L (ref 15–37)
BASOPHILS ABSOLUTE: 0.2 K/UL (ref 0–0.2)
BASOPHILS RELATIVE PERCENT: 1.1 %
BILIRUB SERPL-MCNC: 1.7 MG/DL (ref 0–1)
BUN BLDV-MCNC: 10 MG/DL (ref 7–20)
CALCIUM SERPL-MCNC: 9.3 MG/DL (ref 8.3–10.6)
CHLORIDE BLD-SCNC: 106 MMOL/L (ref 99–110)
CO2: 26 MMOL/L (ref 21–32)
CREAT SERPL-MCNC: 0.7 MG/DL (ref 0.9–1.3)
EOSINOPHILS ABSOLUTE: 0.2 K/UL (ref 0–0.6)
EOSINOPHILS RELATIVE PERCENT: 1.2 %
GFR AFRICAN AMERICAN: >60
GFR NON-AFRICAN AMERICAN: >60
GLOBULIN: 3.6 G/DL
GLUCOSE BLD-MCNC: 86 MG/DL (ref 70–99)
HCT VFR BLD CALC: 24.1 % (ref 40.5–52.5)
HEMATOLOGY PATH CONSULT: YES
HEMOGLOBIN: 8.1 G/DL (ref 13.5–17.5)
IMMATURE RETIC FRACT: 0.68 (ref 0.21–0.37)
LYMPHOCYTES ABSOLUTE: 3.5 K/UL (ref 1–5.1)
LYMPHOCYTES RELATIVE PERCENT: 24.7 %
MCH RBC QN AUTO: 33.5 PG (ref 26–34)
MCHC RBC AUTO-ENTMCNC: 33.6 G/DL (ref 31–36)
MCV RBC AUTO: 99.7 FL (ref 80–100)
MONOCYTES ABSOLUTE: 1.4 K/UL (ref 0–1.3)
MONOCYTES RELATIVE PERCENT: 10.1 %
NEUTROPHILS ABSOLUTE: 8.9 K/UL (ref 1.7–7.7)
NEUTROPHILS RELATIVE PERCENT: 62.9 %
PDW BLD-RTO: 21 % (ref 12.4–15.4)
PLATELET # BLD: 1257 K/UL (ref 135–450)
PLATELET SLIDE REVIEW: ABNORMAL
PMV BLD AUTO: 7 FL (ref 5–10.5)
POTASSIUM REFLEX MAGNESIUM: 3.9 MMOL/L (ref 3.5–5.1)
RBC # BLD: 2.42 M/UL (ref 4.2–5.9)
RETICULOCYTE ABSOLUTE COUNT: 0.2 M/UL
RETICULOCYTE COUNT PCT: 8.26 % (ref 0.5–2.18)
SLIDE REVIEW: ABNORMAL
SODIUM BLD-SCNC: 141 MMOL/L (ref 136–145)
TOTAL PROTEIN: 7.3 G/DL (ref 6.4–8.2)
WBC # BLD: 14.2 K/UL (ref 4–11)

## 2021-08-08 PROCEDURE — 85045 AUTOMATED RETICULOCYTE COUNT: CPT

## 2021-08-08 PROCEDURE — 1200000000 HC SEMI PRIVATE

## 2021-08-08 PROCEDURE — 96376 TX/PRO/DX INJ SAME DRUG ADON: CPT

## 2021-08-08 PROCEDURE — 96374 THER/PROPH/DIAG INJ IV PUSH: CPT

## 2021-08-08 PROCEDURE — 6370000000 HC RX 637 (ALT 250 FOR IP): Performed by: INTERNAL MEDICINE

## 2021-08-08 PROCEDURE — 6360000002 HC RX W HCPCS: Performed by: EMERGENCY MEDICINE

## 2021-08-08 PROCEDURE — 6360000002 HC RX W HCPCS: Performed by: INTERNAL MEDICINE

## 2021-08-08 PROCEDURE — 96375 TX/PRO/DX INJ NEW DRUG ADDON: CPT

## 2021-08-08 PROCEDURE — 80053 COMPREHEN METABOLIC PANEL: CPT

## 2021-08-08 PROCEDURE — 71046 X-RAY EXAM CHEST 2 VIEWS: CPT

## 2021-08-08 PROCEDURE — 96361 HYDRATE IV INFUSION ADD-ON: CPT

## 2021-08-08 PROCEDURE — 99285 EMERGENCY DEPT VISIT HI MDM: CPT

## 2021-08-08 PROCEDURE — 2580000003 HC RX 258: Performed by: EMERGENCY MEDICINE

## 2021-08-08 PROCEDURE — 2580000003 HC RX 258: Performed by: INTERNAL MEDICINE

## 2021-08-08 PROCEDURE — 85025 COMPLETE CBC W/AUTO DIFF WBC: CPT

## 2021-08-08 RX ORDER — HYDROXYUREA 500 MG/1
1000 CAPSULE ORAL 2 TIMES DAILY
Status: DISCONTINUED | OUTPATIENT
Start: 2021-08-08 | End: 2021-08-08

## 2021-08-08 RX ORDER — 0.9 % SODIUM CHLORIDE 0.9 %
1000 INTRAVENOUS SOLUTION INTRAVENOUS ONCE
Status: COMPLETED | OUTPATIENT
Start: 2021-08-08 | End: 2021-08-08

## 2021-08-08 RX ORDER — ONDANSETRON 4 MG/1
4 TABLET, ORALLY DISINTEGRATING ORAL EVERY 8 HOURS PRN
Status: DISCONTINUED | OUTPATIENT
Start: 2021-08-08 | End: 2021-08-09 | Stop reason: HOSPADM

## 2021-08-08 RX ORDER — POLYETHYLENE GLYCOL 3350 17 G/17G
17 POWDER, FOR SOLUTION ORAL DAILY PRN
Status: DISCONTINUED | OUTPATIENT
Start: 2021-08-08 | End: 2021-08-09 | Stop reason: HOSPADM

## 2021-08-08 RX ORDER — DEXTROSE AND SODIUM CHLORIDE 5; .45 G/100ML; G/100ML
INJECTION, SOLUTION INTRAVENOUS CONTINUOUS
Status: DISCONTINUED | OUTPATIENT
Start: 2021-08-08 | End: 2021-08-09 | Stop reason: HOSPADM

## 2021-08-08 RX ORDER — KETOROLAC TROMETHAMINE 30 MG/ML
30 INJECTION, SOLUTION INTRAMUSCULAR; INTRAVENOUS EVERY 12 HOURS
Status: DISCONTINUED | OUTPATIENT
Start: 2021-08-08 | End: 2021-08-09 | Stop reason: HOSPADM

## 2021-08-08 RX ORDER — SODIUM CHLORIDE 9 MG/ML
25 INJECTION, SOLUTION INTRAVENOUS PRN
Status: DISCONTINUED | OUTPATIENT
Start: 2021-08-08 | End: 2021-08-09 | Stop reason: HOSPADM

## 2021-08-08 RX ORDER — FOLIC ACID 1 MG/1
1 TABLET ORAL DAILY
Status: DISCONTINUED | OUTPATIENT
Start: 2021-08-08 | End: 2021-08-09 | Stop reason: HOSPADM

## 2021-08-08 RX ORDER — OXYCODONE HCL 20 MG/1
20 TABLET, FILM COATED, EXTENDED RELEASE ORAL EVERY 12 HOURS SCHEDULED
Status: DISCONTINUED | OUTPATIENT
Start: 2021-08-08 | End: 2021-08-09 | Stop reason: HOSPADM

## 2021-08-08 RX ORDER — KETOROLAC TROMETHAMINE 30 MG/ML
15 INJECTION, SOLUTION INTRAMUSCULAR; INTRAVENOUS ONCE
Status: COMPLETED | OUTPATIENT
Start: 2021-08-08 | End: 2021-08-08

## 2021-08-08 RX ORDER — HYDROXYUREA 500 MG/1
1000 CAPSULE ORAL 2 TIMES DAILY
Status: DISCONTINUED | OUTPATIENT
Start: 2021-08-08 | End: 2021-08-09

## 2021-08-08 RX ORDER — LACTULOSE 10 G/15ML
15 SOLUTION ORAL DAILY PRN
Status: DISCONTINUED | OUTPATIENT
Start: 2021-08-08 | End: 2021-08-09 | Stop reason: HOSPADM

## 2021-08-08 RX ORDER — ONDANSETRON 2 MG/ML
4 INJECTION INTRAMUSCULAR; INTRAVENOUS EVERY 6 HOURS PRN
Status: DISCONTINUED | OUTPATIENT
Start: 2021-08-08 | End: 2021-08-09 | Stop reason: HOSPADM

## 2021-08-08 RX ORDER — SODIUM CHLORIDE 0.9 % (FLUSH) 0.9 %
5-40 SYRINGE (ML) INJECTION PRN
Status: DISCONTINUED | OUTPATIENT
Start: 2021-08-08 | End: 2021-08-09 | Stop reason: HOSPADM

## 2021-08-08 RX ORDER — ACETAMINOPHEN 325 MG/1
650 TABLET ORAL EVERY 6 HOURS PRN
Status: DISCONTINUED | OUTPATIENT
Start: 2021-08-08 | End: 2021-08-09 | Stop reason: HOSPADM

## 2021-08-08 RX ORDER — ACETAMINOPHEN 650 MG/1
650 SUPPOSITORY RECTAL EVERY 6 HOURS PRN
Status: DISCONTINUED | OUTPATIENT
Start: 2021-08-08 | End: 2021-08-09 | Stop reason: HOSPADM

## 2021-08-08 RX ORDER — DULOXETIN HYDROCHLORIDE 30 MG/1
30 CAPSULE, DELAYED RELEASE ORAL DAILY
Status: DISCONTINUED | OUTPATIENT
Start: 2021-08-08 | End: 2021-08-09 | Stop reason: HOSPADM

## 2021-08-08 RX ORDER — HYDROMORPHONE HCL 110MG/55ML
2 PATIENT CONTROLLED ANALGESIA SYRINGE INTRAVENOUS EVERY 4 HOURS PRN
Status: DISCONTINUED | OUTPATIENT
Start: 2021-08-08 | End: 2021-08-09 | Stop reason: HOSPADM

## 2021-08-08 RX ORDER — BUDESONIDE AND FORMOTEROL FUMARATE DIHYDRATE 80; 4.5 UG/1; UG/1
2 AEROSOL RESPIRATORY (INHALATION) PRN
Status: DISCONTINUED | OUTPATIENT
Start: 2021-08-08 | End: 2021-08-09 | Stop reason: HOSPADM

## 2021-08-08 RX ORDER — BUDESONIDE AND FORMOTEROL FUMARATE DIHYDRATE 80; 4.5 UG/1; UG/1
2 AEROSOL RESPIRATORY (INHALATION) 2 TIMES DAILY
Status: DISCONTINUED | OUTPATIENT
Start: 2021-08-08 | End: 2021-08-08

## 2021-08-08 RX ORDER — SODIUM CHLORIDE 0.9 % (FLUSH) 0.9 %
5-40 SYRINGE (ML) INJECTION EVERY 12 HOURS SCHEDULED
Status: DISCONTINUED | OUTPATIENT
Start: 2021-08-08 | End: 2021-08-09 | Stop reason: HOSPADM

## 2021-08-08 RX ORDER — OXYCODONE HYDROCHLORIDE 5 MG/1
10 TABLET ORAL EVERY 4 HOURS PRN
Status: DISCONTINUED | OUTPATIENT
Start: 2021-08-08 | End: 2021-08-09 | Stop reason: HOSPADM

## 2021-08-08 RX ADMIN — SODIUM CHLORIDE 1000 ML: 9 INJECTION, SOLUTION INTRAVENOUS at 05:26

## 2021-08-08 RX ADMIN — HYDROXYUREA 1000 MG: 500 CAPSULE ORAL at 21:41

## 2021-08-08 RX ADMIN — SODIUM CHLORIDE 1000 ML: 9 INJECTION, SOLUTION INTRAVENOUS at 07:17

## 2021-08-08 RX ADMIN — HYDROMORPHONE HYDROCHLORIDE 2 MG: 2 INJECTION, SOLUTION INTRAMUSCULAR; INTRAVENOUS; SUBCUTANEOUS at 19:37

## 2021-08-08 RX ADMIN — OXYCODONE HYDROCHLORIDE 20 MG: 15 TABLET ORAL at 17:13

## 2021-08-08 RX ADMIN — DEXTROSE AND SODIUM CHLORIDE: 5; 450 INJECTION, SOLUTION INTRAVENOUS at 21:44

## 2021-08-08 RX ADMIN — OXYCODONE HYDROCHLORIDE 20 MG: 20 TABLET, FILM COATED, EXTENDED RELEASE ORAL at 12:35

## 2021-08-08 RX ADMIN — DEXTROSE AND SODIUM CHLORIDE: 5; 450 INJECTION, SOLUTION INTRAVENOUS at 11:16

## 2021-08-08 RX ADMIN — HYDROMORPHONE HYDROCHLORIDE 2 MG: 2 INJECTION, SOLUTION INTRAMUSCULAR; INTRAVENOUS; SUBCUTANEOUS at 15:25

## 2021-08-08 RX ADMIN — HYDROMORPHONE HYDROCHLORIDE 2 MG: 2 INJECTION, SOLUTION INTRAMUSCULAR; INTRAVENOUS; SUBCUTANEOUS at 23:03

## 2021-08-08 RX ADMIN — Medication 1 MG: at 05:27

## 2021-08-08 RX ADMIN — KETOROLAC TROMETHAMINE 30 MG: 30 INJECTION, SOLUTION INTRAMUSCULAR; INTRAVENOUS at 12:00

## 2021-08-08 RX ADMIN — OXYCODONE HYDROCHLORIDE 20 MG: 20 TABLET, FILM COATED, EXTENDED RELEASE ORAL at 21:40

## 2021-08-08 RX ADMIN — DULOXETINE HYDROCHLORIDE 30 MG: 30 CAPSULE, DELAYED RELEASE ORAL at 11:57

## 2021-08-08 RX ADMIN — HYDROMORPHONE HYDROCHLORIDE 1 MG: 1 INJECTION, SOLUTION INTRAMUSCULAR; INTRAVENOUS; SUBCUTANEOUS at 07:17

## 2021-08-08 RX ADMIN — KETOROLAC TROMETHAMINE 15 MG: 30 INJECTION, SOLUTION INTRAMUSCULAR; INTRAVENOUS at 05:26

## 2021-08-08 RX ADMIN — Medication 10 ML: at 11:18

## 2021-08-08 RX ADMIN — FOLIC ACID 1 MG: 1 TABLET ORAL at 11:57

## 2021-08-08 RX ADMIN — HYDROXYUREA 1000 MG: 500 CAPSULE ORAL at 11:57

## 2021-08-08 RX ADMIN — HYDROMORPHONE HYDROCHLORIDE 0.5 MG: 1 INJECTION, SOLUTION INTRAMUSCULAR; INTRAVENOUS; SUBCUTANEOUS at 11:21

## 2021-08-08 ASSESSMENT — PAIN DESCRIPTION - FREQUENCY
FREQUENCY: CONTINUOUS

## 2021-08-08 ASSESSMENT — PAIN SCALES - GENERAL
PAINLEVEL_OUTOF10: 9
PAINLEVEL_OUTOF10: 8
PAINLEVEL_OUTOF10: 9
PAINLEVEL_OUTOF10: 8
PAINLEVEL_OUTOF10: 9
PAINLEVEL_OUTOF10: 9
PAINLEVEL_OUTOF10: 8

## 2021-08-08 ASSESSMENT — PAIN DESCRIPTION - LOCATION
LOCATION: BACK

## 2021-08-08 ASSESSMENT — PAIN DESCRIPTION - DIRECTION
RADIATING_TOWARDS: WHOLE BACK
RADIATING_TOWARDS: ENTIRE SPINE

## 2021-08-08 ASSESSMENT — PAIN DESCRIPTION - DESCRIPTORS
DESCRIPTORS: CONSTANT

## 2021-08-08 ASSESSMENT — PAIN DESCRIPTION - ORIENTATION
ORIENTATION: MID
ORIENTATION: MID

## 2021-08-08 ASSESSMENT — PAIN DESCRIPTION - PAIN TYPE
TYPE: ACUTE PAIN;CHRONIC PAIN
TYPE: ACUTE PAIN
TYPE: ACUTE PAIN;CHRONIC PAIN

## 2021-08-08 NOTE — CONSULTS
ONCOLOGY HEMATOLOGY CARE CONSULT NOTE      Requesting Physician:  Dr. Gilford Goon:  Pain crisis        HISTORY OF PRESENT ILLNESS:      Mr. Digna Tadeo  is a 24 y.o. male we are seeing in consultation for sickle cell pain crisis. He sees my partner, Dr. Caprice Barcenas. He takes Oxycontin 20 mg BID and Oxycodone 10-20 mg PRN as a home regimen. He was admitted earlier today for a pain crisis. He is sleeping. When awakened he says that the pain in his back. He is afebrile and denies pain at home. 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 Current Outpatient Medications   Medication Sig Dispense Refill    oxyCODONE HCl (OXY-IR) 10 MG immediate release tablet Take 10 mg by mouth every 4 hours as needed for Pain.  albuterol sulfate HFA (PROAIR HFA) 108 (90 Base) MCG/ACT inhaler Albuterol HFA Inhaler 90 mcg/actuation  inhaled  2 puffs prn     Active      mometasone-formoterol (DULERA) 100-5 MCG/ACT inhaler Mometasone-Formoterol HFA Inhaler 100 mcg-5 mcg/actuation  inhaled  2 puffs every am     Active      levalbuterol (XOPENEX) 0.31 MG/3ML nebulization Levalbuterol Nebulized    2 puffs prn     Active      lactulose (CHRONULAC) 10 GM/15ML solution Take 15 mLs by mouth as needed       docusate (COLACE, DULCOLAX) 100 MG CAPS Take 100 mg by mouth      ibuprofen (ADVIL;MOTRIN) 800 MG tablet ibuprofen 800 MG Oral Tablet  oral   prn    Active      oxyCODONE (OXYCONTIN) 20 MG extended release tablet TAKE 1 TABLET BY MOUTH EVERY 12 HOURS. TAKE WITH ENOUGH WATER TO SWALLOW WHOLE. DO NOT CRUSH/DISSOLVE/CHEW/CUT/BREAK.       hydroxyurea (HYDREA) 500 MG chemo capsule Take 2 capsules by mouth 2 times daily       Allergies: Allergies   Allergen Reactions    Morphine Shortness Of Breath     Other reaction(s): Histamine-like Reaction  Has asthma exacerbation with morphine-histamine type reaction         Social History:   Social History     Socioeconomic History    Marital status: Single     Spouse name: Not on file    Number of children: 0    Years of education: Not on file    Highest education level: Not on file   Occupational History    Not on file   Tobacco Use    Smoking status: Never Smoker    Smokeless tobacco: Never Used   Vaping Use    Vaping Use: Never used   Substance and Sexual Activity    Alcohol use: Not Currently    Drug use: Never    Sexual activity: Not Currently   Other Topics Concern    Not on file   Social History Narrative    Not on file     Social Determinants of Health     Financial Resource Strain:     Difficulty of Paying Living Expenses:    Food Insecurity:     Worried About Running Out of Food in the Last Year:     Ran Out of Food in the Last Year:    Transportation Needs:     Lack of Transportation (Medical):  Lack of Transportation (Non-Medical):    Physical Activity:     Days of Exercise per Week:     Minutes of Exercise per Session:    Stress:     Feeling of Stress :    Social Connections:     Frequency of Communication with Friends and Family:     Frequency of Social Gatherings with Friends and Family:     Attends Advent Services:     Active Member of Clubs or Organizations:     Attends Club or Organization Meetings:     Marital Status:    Intimate Partner Violence:     Fear of Current or Ex-Partner:     Emotionally Abused:     Physically Abused:     Sexually Abused:           Family History:     No family history on file.   REVIEW OF SYSTEMS:    Review of Systems    PHYSICAL EXAM:      Vitals:  BP 93/62   Pulse 73   Temp 98 °F (36.7 °C)   Resp 21   Ht 5' 8\" (1.727 m)   Wt 190 lb (86.2 kg)   SpO2 99%   BMI 28.89 kg/m²     CONSTITUTIONAL:  awake, alert, cooperative, no apparent distress, and appears stated age NAD  EYES:  pupils equal, round and reactive to light, extra ocular muscles intact,sclera clear, conjunctiva normal  NECK:  Supple, symmetrical, trachea midline, no adenopathy, thyroid symmetric, not enlarged and no tenderness, skin normal  HEMATOLOGIC/LYMPHATICS:  no cervical lymphadenopathy, nosupraclavicular lymphadenopathy, no axillary lymphadenopathy and no inguinal lymphadenopathy  LUNGS:  No increased work of breathing, good air exchange, clear to auscultation bilaterally, no crackles or wheezing  CARDIOVASCULAR:  , regular rate and rhythm, normal S1 and S2, no S3 or S4, and no murmur noted  ABDOMEN:  No scars, normal bowel sounds, soft, non-distended, non-tender, no masses palpated, no hepatosplenomegally      MUSCULOSKELETAL:  There is no redness, warmth, or swelling of the joints. Full range of motion noted. Motor strength is 5 out of 5 all extremities bilaterally. NEUROLOGIC:  Awake, alert, oriented to name, place andtime. Cranial nerves II-XII are grossly intact. Motor is 5 out of 5 bilaterally. SKIN:  no bruising or bleeding      DATA:    PT/INR:    Recent Labs     08/08/21 0521   PROT 7.3     PTT:No results for input(s): APTT in the last 72 hours.   CMP:    Lab Results   Component Value Date     08/08/2021    K 3.9 08/08/2021     08/08/2021    CO2 26 08/08/2021    BUN 10 08/08/2021    PROT 7.3 08/08/2021     Magnesium:    Lab Results   Component Value Date    MG 1.80 05/05/2021     Phosphorus:  No components found for: PO4  Calcium:  No components found for: CA  CBC:    Lab Results   Component Value Date    WBC 14.2 08/08/2021    RBC 2.42 08/08/2021    HGB 8.1 08/08/2021    HCT 24.1 08/08/2021    MCV 99.7 08/08/2021    RDW 21.0 08/08/2021    PLT 1,257 08/08/2021     DIFF:    Lab Results   Component Value Date    MCV 99.7 08/08/2021    RDW 21.0 08/08/2021      LDH:  @labcrnt(LDH)@  Uric Acid:  @labcrnt(URIC)@    Radiology Review:  XR CHEST (2 VW) (8/08/2021): Impression   Cardiomegaly with no acute finding in the chest.         Problem List  Patient Active Problem List   Diagnosis    [facility] pain crisis ([facility]    Asthma    Sickle cell crisis ([facility]    Sepsis ([facility]    Opiate overdose ([facility]    Opiate antagonist poisoning, accidental or unintentional, initial encounter    Leukocytosis    Hypoxia    Anemia    Other chest pain    Pneumonia due to infectious organism    Fever    Chronic prescription opiate use       IMPRESSION/RECOMMENDATIONS:  Sickle cell pain crisis  - Home regimen is Oxycontin 20 mg BID and Oxycodone 10-20 mg PRN. - IVF/O2/folic acid. - Dilaudid 2 mq IV PRN.  - Give Toradol 30 mg.  - Hydrea 1000 mg BID. - Cymbalta 30 mg.  - I do worry addiction is an issue here. If no concerning findings overnight then would discharge tomorrow. Thrombocytosis  - He is surgically asplenic. Thank you for asking me to see the patient.        Raj Gardiner MD  PleaseContact Through [facility]

## 2021-08-08 NOTE — H&P
Hospital Medicine History & Physical      PCP: Wing Osborn MD    Date of Admission: 8/8/2021    Date of Service: Pt seen/examined on 08/08/21  and Admitted to Inpatient with expected LOS greater than two midnights due to medical therapy. Chief Complaint   Patient presents with    Back Pain     entire spine, started at midnight, pt thinks it's sickle cell     History Of Present Illness:    24 y.o. male with PMHx of Sickle Cell Anemia (who was recently discharged from this facility after being treated for sickle cell pain crisis on 7/30/2021 ) [facility] with C/o sudden onset of worsening pain in his back and entire spine, ribs started at midnight, 10/ 10 in intensity and hence presented to the ED for further evaluation and management. He denies any fever, chills, nausea, vomiting, shortness of breath, chest pain at this time. Well-known to hematology service and they were consulted with plan for admission for further evaluation and management. Upon evaluation by me in ED, patient hemodynamically stable and basic labs unremarkable. He was sleeping upon entering his room and when awakened, complaints about pain in his back and his ribs and is requesting for pain medication. RN notified. 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 Authorizing Provider   oxyCODONE HCl (OXY-IR) 10 MG immediate release tablet Take 10 mg by mouth every 4 hours as needed for Pain.    Yes Historical Provider, MD   albuterol sulfate HFA (PROAIR HFA) 108 (90 Base) MCG/ACT inhaler Albuterol HFA Inhaler 90 mcg/actuation  inhaled  2 puffs prn     Active   Yes Historical Provider, MD   mometasone-formoterol (DULERA) 100-5 MCG/ACT inhaler Mometasone-Formoterol HFA Inhaler 100 mcg-5 mcg/actuation  inhaled  2 puffs every am     Active   Yes Historical Provider, MD   levalbuterol (XOPENEX) 0.31 MG/3ML nebulization Levalbuterol Nebulized    2 puffs prn     Active   Yes Historical Provider, MD   lactulose (CHRONULAC) 10 GM/15ML solution Take 15 mLs by mouth as needed    Yes Historical Provider, MD   docusate (COLACE, DULCOLAX) 100 MG CAPS Take 100 mg by mouth   Yes Historical Provider, MD   ibuprofen (ADVIL;MOTRIN) 800 MG tablet ibuprofen 800 MG Oral Tablet  oral   prn    Active   Yes Historical Provider, MD   oxyCODONE (OXYCONTIN) 20 MG extended release tablet TAKE 1 TABLET BY MOUTH EVERY 12 HOURS. TAKE WITH ENOUGH WATER TO SWALLOW WHOLE. DO NOT CRUSH/DISSOLVE/CHEW/CUT/BREAK. 4/12/21  Yes Historical Provider, MD   hydroxyurea (HYDREA) 500 MG chemo capsule Take 2 capsules by mouth 2 times daily 12/9/20  Yes Trevin Rivera MD       Allergies:  Morphine    Social History:    The patient currently lives at home and is independent of IADLs    TOBACCO:   reports that he has never smoked. He has never used smokeless tobacco.  ETOH:   reports previous alcohol use. Family History:     Reviewed in detail and negative for DM, CAD, Cancer, CVA. Positive as follows:    No family history on file. REVIEW OF SYSTEMS:   Pertinent positives as noted in the HPI. All other systems reviewed and negative. PHYSICAL EXAM PERFORMED:  /64   Pulse 65   Temp 97.8 °F (36.6 °C) (Oral)   Resp 19   Ht 5' 8\" (1.727 m)   Wt 190 lb (86.2 kg)   SpO2 97%   BMI 28.89 kg/m²     General appearance: Young AA male in  no apparent distress, appears stated age and cooperative. HEENT:  Normal cephalic, atraumatic without obvious deformity. Pupils equal, round, and reactive to light. Extra ocular muscles intact. Conjunctivae/corneas clear. Neck: Supple, with full range of motion.  No jugular venous distention. Trachea midline. Respiratory:  Normal respiratory effort. Clear to auscultation, bilaterally without Rales/Wheezes/Rhonchi. Cardiovascular:  Regular rate and rhythm with normal S1/S2 without murmurs, rubs or gallops. Abdomen: Soft, non-tender, non-distended with normal bowel sounds. Musculoskeletal:  No clubbing, cyanosis or edema bilaterally. Full range of motion without deformity. Skin: Skin color, texture, turgor normal.  No rashes or lesions. Neurologic:  Neurovascularly intact without any focal sensory/motor deficits. Cranial nerves: II-XII intact, grossly non-focal.  Psychiatric:  Alert and oriented, thought content appropriate, normal insight  Capillary Refill: Brisk,< 3 seconds   Peripheral Pulses: +2 palpable, equal bilaterally       Labs:   Recent Labs     08/08/21  0521   WBC 14.2*   HGB 8.1*   HCT 24.1*   PLT 1,257*     Recent Labs     08/08/21  0521      K 3.9      CO2 26   BUN 10   CREATININE 0.7*   CALCIUM 9.3     Recent Labs     08/08/21  0521   AST 20   ALT 23   BILITOT 1.7*   ALKPHOS 132*     Radiology:    I have reviewed the Imaging  with the following interpretation:  XR CHEST (2 VW)   Final Result   Cardiomegaly with no acute finding in the chest.           ASSESSMENT/PLAN:  1. Back and rib pain secondary to sickle cell pain crisis - per emr, this will be his 7th presentation since beginning of May 2021.    -hemonc consulted from ED and will defer further pain management to them.  -continue ivfs, hydroxyurea  -previously pt was sent on oxycodone 10 mg prescription through the [facility] EMR.        2. Thrombocytosis - Likely reactive - Platelet count 0294 on admission . 3. Asthma - controlled on home inhaled bronchodilators - continue    DVT Prophylaxis: Lovenox   Diet: No diet orders on file  Code Status: Prior    PT/OT Eval Status:  Will order if needed     Dispo - Anticipate > 2 MN stay in the hospital      Rayne Schwab, MD    The note was completed using Dragon -speech recognition software & EMR  . Every effort was made to ensure accuracy; however, inadvertent computerized transcription errors may be present. Thank you Miguel Angel Estrella MD for the opportunity to be involved in this patient's care. If you have any questions or concerns please feel free to contact me at [phone].

## 2021-08-08 NOTE — PROGRESS NOTES
[facility] to Dr. Madison Samuel    8/8/21 2:25 PM   Patient has different orders for IV dilaudid. Your order is 2mg q4h PRN and Dr. Charlene Roman order is 0.25mg or 0.5mg q3h PRN. Can you clarify? Thanks.      8/8/21 2:26 PM   2 mg IV q4 hrs prn

## 2021-08-08 NOTE — ED PROVIDER NOTES
[facility]  ED PROVIDER NOTE    Patient Identification  Pt Name: Emma Griffin  MRN: [id number]  Cl[place] 2000  Date of evaluation: 8/8/2021  Provider: Joaquim Blair MD  PCP: Kamari Larsen MD    Chief Complaint  Back Pain (entire spine, started at midnight, pt thinks it's sickle cell)      HPI  History provided by patient   This is a 24 y.o. male who presents to the ED for chief complaint of sickle cell crisis. He has pain across his entire back as well as his ribs. This is happened multiple times in the past.  Denies fever and nausea. No shortness of breath no headache. No clear exacerbating remitting factors. Symptoms severe in intensity. ROS  10 systems reviewed, pertinent positives/negatives per HPI otherwise noted to be negative.     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)     Sickle cell anemia (HCC)      Past surgical history:   Past Surgical History:   Procedure Laterality Date    SPLENECTOMY         Home medications:   Previous Medications    ALBUTEROL SULFATE HFA (PROAIR HFA) 108 (90 BASE) MCG/ACT INHALER    Albuterol HFA Inhaler 90 mcg/actuation  inhaled  2 puffs prn     Active    DOCUSATE (COLACE, DULCOLAX) 100 MG CAPS    Take 100 mg by mouth    HYDROXYUREA (HYDREA) 500 MG CHEMO CAPSULE    Take 2 capsules by mouth 2 times daily    IBUPROFEN (ADVIL;MOTRIN) 800 MG TABLET    ibuprofen 800 MG Oral Tablet  oral   prn    Active    LACTULOSE (CHRONULAC) 10 GM/15ML SOLUTION    Take 15 mLs by mouth as needed     LEVALBUTEROL (XOPENEX) 0.31 MG/3ML NEBULIZATION    Levalbuterol Nebulized    2 puffs prn     Active    MOMETASONE-FORMOTEROL (DULERA) 100-5 MCG/ACT INHALER    Mometasone-Formoterol HFA Inhaler 100 mcg-5 mcg/actuation  inhaled  2 puffs every am     Active OXYCODONE (OXYCONTIN) 20 MG EXTENDED RELEASE TABLET    TAKE 1 TABLET BY MOUTH EVERY 12 HOURS. TAKE WITH ENOUGH WATER TO SWALLOW WHOLE. DO NOT CRUSH/DISSOLVE/CHEW/CUT/BREAK. OXYCODONE HCL (OXY-IR) 10 MG IMMEDIATE RELEASE TABLET    Take 10 mg by mouth every 4 hours as needed for Pain. Social history:  reports that he has never smoked. He has never used smokeless tobacco. He reports previous alcohol use. He reports that he does not use drugs. Family history:  No family history on file. Exam  ED Triage Vitals [08/08/21 0354]   BP Temp Temp Source Pulse Resp SpO2 Height Weight   102/67 97.8 °F (36.6 °C) Oral 66 14 98 % 5' 8\" (1.727 m) 190 lb (86.2 kg)     Nursing note and vitals reviewed. Constitutional: In no acute distress  HENT:      Head: Normocephalic      Ears: External ears normal.      Nose: Nose normal.     Mouth: Membrane mucosa moist   Eyes: No discharge. Neck: Supple. Trachea midline. Cardiovascular: Regular rate. Warm extremities  Pulmonary/Chest: Effort normal. No respiratory distress. Abdominal: Soft. No distension. Nontender  : Deferred  Rectal: Deferred   Musculoskeletal: Moves all extremities. No gross deformity. Neurological: Alert and oriented. Face symmetric. Speech is clear. Skin: Warm and dry. Psychiatric: Normal mood and affect.  Behavior is normal.    Procedures            Radiology  XR CHEST (2 VW)   Final Result   Cardiomegaly with no acute finding in the chest.             Labs  Results for orders placed or performed during the hospital encounter of 08/08/21   Reticulocytes   Result Value Ref Range    Retic Ct Pct 8.26 (H) 0.50 - 2.18 %    Retic Ct Abs 0.200 M/uL    Immature Retic Fract 0.68 (H) 0.21 - 0.37    Hematocrit 24.1 (L) 40.5 - 52.5 %   CBC Auto Differential   Result Value Ref Range    WBC 14.2 (H) 4.0 - 11.0 K/uL    RBC 2.42 (L) 4.20 - 5.90 M/uL    Hemoglobin 8.1 (L) 13.5 - 17.5 g/dL    MCV 99.7 80.0 - 100.0 fL    MCH 33.5 26.0 - 34.0 pg    MCHC 33.6 31.0 - 36.0 g/dL    RDW 21.0 (H) 12.4 - 15.4 %    Platelets 8,401 (HH) 135 - 450 K/uL    MPV 7.0 5.0 - 10.5 fL    PLATELET SLIDE REVIEW Increased     SLIDE REVIEW see below     Path Consult Yes     Neutrophils % 62.9 %    Lymphocytes % 24.7 %    Monocytes % 10.1 %    Eosinophils % 1.2 %    Basophils % 1.1 %    Neutrophils Absolute 8.9 (H) 1.7 - 7.7 K/uL    Lymphocytes Absolute 3.5 1.0 - 5.1 K/uL    Monocytes Absolute 1.4 (H) 0.0 - 1.3 K/uL    Eosinophils Absolute 0.2 0.0 - 0.6 K/uL    Basophils Absolute 0.2 0.0 - 0.2 K/uL   Comprehensive Metabolic Panel w/ Reflex to MG   Result Value Ref Range    Sodium 141 136 - 145 mmol/L    Potassium reflex Magnesium 3.9 3.5 - 5.1 mmol/L    Chloride 106 99 - 110 mmol/L    CO2 26 21 - 32 mmol/L    Anion Gap 9 3 - 16    Glucose 86 70 - 99 mg/dL    BUN 10 7 - 20 mg/dL    CREATININE 0.7 (L) 0.9 - 1.3 mg/dL    GFR Non-African American >60 >60    GFR African American >60 >60    Calcium 9.3 8.3 - 10.6 mg/dL    Total Protein 7.3 6.4 - 8.2 g/dL    Albumin 3.7 3.4 - 5.0 g/dL    Albumin/Globulin Ratio 1.0 (L) 1.1 - 2.2    Total Bilirubin 1.7 (H) 0.0 - 1.0 mg/dL    Alkaline Phosphatase 132 (H) 40 - 129 U/L    ALT 23 10 - 40 U/L    AST 20 15 - 37 U/L    Globulin 3.6 g/dL       Screenings   Blade Coma Scale  Eye Opening: Spontaneous  Best Verbal Response: Oriented  Best Motor Response: Obeys commands  Rhame Coma Scale Score: 15       MDM and ED Course  This is a 24 y.o. male who presents to the ED for possible sickle cell crisis. Vital signs unremarkable. Obtaining chest x-ray to evaluate for pneumonia. Will give IV fluids and 2 doses of Dilaudid max with Toradol and reevaluate. No tachycardia, tachypnea, hypoxemia, unilateral DVT symptoms to indicate pulmonary embolism. Found to have impressive thrombocytosis and increased retic count. Still in pain after 3 doses IV pain meds. Will admit to hospitalist service    [unfilled]    Final Impression  1.  Sickle cell crisis ([facility] Blood pressure 106/64, pulse 65, temperature 97.8 °F (36.6 °C), temperature source Oral, resp. rate 19, height 5' 8\" (1.727 m), weight 190 lb (86.2 kg), SpO2 97 %. Disposition:  DISPOSITION Decision To Admit 08/08/2021 06:52:39 AM      Patient Referrals:  No follow-up provider specified. Discharge Medications:  New Prescriptions    No medications on file       Discontinued Medications:  Discontinued Medications    No medications on file       This chart was generated using the [address] [place] [address] dictation system. I created this record but it may contain dictation errors given the limitations of this technology.         Ting Medina MD  08/08/21 0914

## 2021-08-08 NOTE — PROGRESS NOTES
4 Eyes Skin Assessment     The patient is being assess for   Admission    I agree that 2 RN's have performed a thorough Head to Toe Skin Assessment on the patient. ALL assessment sites listed below have been assessed. Areas assessed for pressure by both nurses:   [x]   Head, Face, and Ears   [x]   Shoulders, Back, and Chest, Abdomen  [x]   Arms, Elbows, and Hands   [x]   Coccyx, Sacrum, and Ischium  [x]   Legs, Feet, and Heels               **SHARE this note so that the co-signing nurse is able to place an eSignature**    Co-signer eSignature: {Esignature:[id number]}    Does the Patient have Skin Breakdown related to pressure?   No     Shaun Prevention initiated:  NA   Wound Care Orders initiated:  NA      WOC nurse consulted for Pressure Injury (Stage 3,4, Unstageable, DTI, NWPT, Complex wounds)and New or Established Ostomies:  NA      Primary Nurse eSignature: Electronically signed by William Mcallister RN on 8/8/21 at 11:12 AM EDT

## 2021-08-08 NOTE — PLAN OF CARE
Problem: Pain:  Goal: Pain level will decrease  Description: Pain level will decrease  Outcome: Ongoing  Goal: Control of acute pain  Description: Control of acute pain  Outcome: Ongoing  Goal: Control of chronic pain  Description: Control of chronic pain  Outcome: Ongoing  Goal: Patient's pain/discomfort is manageable  Description: Patient's pain/discomfort is manageable  Outcome: Ongoing     Problem: Infection:  Goal: Will remain free from infection  Description: Will remain free from infection  Outcome: Ongoing     Problem: Safety:  Goal: Free from accidental physical injury  Description: Free from accidental physical injury  Outcome: Ongoing  Goal: Free from intentional harm  Description: Free from intentional harm  Outcome: Ongoing     Problem: Daily Care:  Goal: Daily care needs are met  Description: Daily care needs are met  Outcome: Ongoing     Problem: Skin Integrity:  Goal: Skin integrity will stabilize  Description: Skin integrity will stabilize  Outcome: Ongoing

## 2021-08-08 NOTE — ED NOTES
Bed: 08  Expected date:   Expected time:   Means of arrival:   Comments:  [id number] [facility], [address]  08/08/21 9955

## 2021-08-09 VITALS
OXYGEN SATURATION: 100 % | HEIGHT: 68 IN | HEART RATE: 60 BPM | WEIGHT: 190 LBS | TEMPERATURE: 97.8 F | DIASTOLIC BLOOD PRESSURE: 74 MMHG | SYSTOLIC BLOOD PRESSURE: 115 MMHG | RESPIRATION RATE: 18 BRPM | BODY MASS INDEX: 28.79 KG/M2

## 2021-08-09 LAB
ANION GAP SERPL CALCULATED.3IONS-SCNC: 9 MMOL/L (ref 3–16)
BASOPHILS ABSOLUTE: 0.1 K/UL (ref 0–0.2)
BASOPHILS RELATIVE PERCENT: 0.8 %
BLOOD SMEAR REVIEW: NORMAL
BUN BLDV-MCNC: 8 MG/DL (ref 7–20)
CALCIUM SERPL-MCNC: 9.1 MG/DL (ref 8.3–10.6)
CHLORIDE BLD-SCNC: 106 MMOL/L (ref 99–110)
CO2: 25 MMOL/L (ref 21–32)
CREAT SERPL-MCNC: 0.7 MG/DL (ref 0.9–1.3)
EOSINOPHILS ABSOLUTE: 0.2 K/UL (ref 0–0.6)
EOSINOPHILS RELATIVE PERCENT: 2.1 %
GFR AFRICAN AMERICAN: >60
GFR NON-AFRICAN AMERICAN: >60
GLUCOSE BLD-MCNC: 112 MG/DL (ref 70–99)
HCT VFR BLD CALC: 21 % (ref 40.5–52.5)
HEMATOLOGY PATH CONSULT: NO
HEMATOLOGY PATH CONSULT: NORMAL
HEMOGLOBIN: 7.2 G/DL (ref 13.5–17.5)
IMMATURE RETIC FRACT: 0.57 (ref 0.21–0.37)
LYMPHOCYTES ABSOLUTE: 2.5 K/UL (ref 1–5.1)
LYMPHOCYTES RELATIVE PERCENT: 26.4 %
MCH RBC QN AUTO: 33.7 PG (ref 26–34)
MCHC RBC AUTO-ENTMCNC: 34.3 G/DL (ref 31–36)
MCV RBC AUTO: 98.3 FL (ref 80–100)
MONOCYTES ABSOLUTE: 1 K/UL (ref 0–1.3)
MONOCYTES RELATIVE PERCENT: 10.9 %
NEUTROPHILS ABSOLUTE: 5.7 K/UL (ref 1.7–7.7)
NEUTROPHILS RELATIVE PERCENT: 59.8 %
PDW BLD-RTO: 19.8 % (ref 12.4–15.4)
PLATELET # BLD: 1102 K/UL (ref 135–450)
PMV BLD AUTO: 7.1 FL (ref 5–10.5)
POTASSIUM SERPL-SCNC: 4 MMOL/L (ref 3.5–5.1)
RBC # BLD: 2.14 M/UL (ref 4.2–5.9)
RETICULOCYTE ABSOLUTE COUNT: 0.14 M/UL
RETICULOCYTE COUNT PCT: 6.62 % (ref 0.5–2.18)
SODIUM BLD-SCNC: 140 MMOL/L (ref 136–145)
WBC # BLD: 9.5 K/UL (ref 4–11)

## 2021-08-09 PROCEDURE — 6360000002 HC RX W HCPCS: Performed by: INTERNAL MEDICINE

## 2021-08-09 PROCEDURE — 6370000000 HC RX 637 (ALT 250 FOR IP): Performed by: NURSE PRACTITIONER

## 2021-08-09 PROCEDURE — 81270 JAK2 GENE: CPT

## 2021-08-09 PROCEDURE — 85045 AUTOMATED RETICULOCYTE COUNT: CPT

## 2021-08-09 PROCEDURE — 85025 COMPLETE CBC W/AUTO DIFF WBC: CPT

## 2021-08-09 PROCEDURE — 2580000003 HC RX 258: Performed by: INTERNAL MEDICINE

## 2021-08-09 PROCEDURE — 36415 COLL VENOUS BLD VENIPUNCTURE: CPT

## 2021-08-09 PROCEDURE — 6370000000 HC RX 637 (ALT 250 FOR IP): Performed by: INTERNAL MEDICINE

## 2021-08-09 PROCEDURE — 80048 BASIC METABOLIC PNL TOTAL CA: CPT

## 2021-08-09 RX ORDER — HYDROXYUREA 500 MG/1
1500 CAPSULE ORAL 2 TIMES DAILY
Status: DISCONTINUED | OUTPATIENT
Start: 2021-08-09 | End: 2021-08-09 | Stop reason: HOSPADM

## 2021-08-09 RX ADMIN — KETOROLAC TROMETHAMINE 30 MG: 30 INJECTION, SOLUTION INTRAMUSCULAR; INTRAVENOUS at 12:13

## 2021-08-09 RX ADMIN — HYDROMORPHONE HYDROCHLORIDE 2 MG: 2 INJECTION, SOLUTION INTRAMUSCULAR; INTRAVENOUS; SUBCUTANEOUS at 06:50

## 2021-08-09 RX ADMIN — OXYCODONE HYDROCHLORIDE 20 MG: 15 TABLET ORAL at 13:15

## 2021-08-09 RX ADMIN — HYDROMORPHONE HYDROCHLORIDE 2 MG: 2 INJECTION, SOLUTION INTRAMUSCULAR; INTRAVENOUS; SUBCUTANEOUS at 11:03

## 2021-08-09 RX ADMIN — HYDROMORPHONE HYDROCHLORIDE 2 MG: 2 INJECTION, SOLUTION INTRAMUSCULAR; INTRAVENOUS; SUBCUTANEOUS at 02:54

## 2021-08-09 RX ADMIN — OXYCODONE HYDROCHLORIDE 20 MG: 15 TABLET ORAL at 01:55

## 2021-08-09 RX ADMIN — DULOXETINE HYDROCHLORIDE 30 MG: 30 CAPSULE, DELAYED RELEASE ORAL at 09:33

## 2021-08-09 RX ADMIN — DEXTROSE AND SODIUM CHLORIDE: 5; 450 INJECTION, SOLUTION INTRAVENOUS at 09:39

## 2021-08-09 RX ADMIN — OXYCODONE HYDROCHLORIDE 20 MG: 20 TABLET, FILM COATED, EXTENDED RELEASE ORAL at 09:33

## 2021-08-09 RX ADMIN — FOLIC ACID 1 MG: 1 TABLET ORAL at 09:33

## 2021-08-09 RX ADMIN — KETOROLAC TROMETHAMINE 30 MG: 30 INJECTION, SOLUTION INTRAMUSCULAR; INTRAVENOUS at 01:55

## 2021-08-09 RX ADMIN — HYDROXYUREA 1500 MG: 500 CAPSULE ORAL at 09:34

## 2021-08-09 RX ADMIN — OXYCODONE HYDROCHLORIDE 20 MG: 15 TABLET ORAL at 05:53

## 2021-08-09 ASSESSMENT — PAIN SCALES - GENERAL
PAINLEVEL_OUTOF10: 7
PAINLEVEL_OUTOF10: 8
PAINLEVEL_OUTOF10: 7
PAINLEVEL_OUTOF10: 9
PAINLEVEL_OUTOF10: 6
PAINLEVEL_OUTOF10: 8
PAINLEVEL_OUTOF10: 7
PAINLEVEL_OUTOF10: 8

## 2021-08-09 ASSESSMENT — PAIN DESCRIPTION - LOCATION: LOCATION: BACK

## 2021-08-09 ASSESSMENT — PAIN - FUNCTIONAL ASSESSMENT: PAIN_FUNCTIONAL_ASSESSMENT: ACTIVITIES ARE NOT PREVENTED

## 2021-08-09 ASSESSMENT — PAIN DESCRIPTION - PAIN TYPE: TYPE: ACUTE PAIN;CHRONIC PAIN

## 2021-08-09 NOTE — PROGRESS NOTES
ONCOLOGY HEMATOLOGY CARE PROGRESS NOTE      SUBJECTIVE:     He is resting. NO acute pain at present. No abd pain. Has widespread back pain. ROS:     Constitutional:  No weight loss, No fever, No chills, No night sweats. Energy level good. Eyes:  No impairment or change in vision  ENT / Mouth:  No pain, abnormal ulceration, bleeding, nasal drip or change in voice or hearing  Cardiovascular:  No chest pain, palpitations, new edema, or calf discomfort  Respiratory:  No pain, hemoptysis, change to breathing  Breast:  No pain, discharge, change in appearance or texture  Gastrointestinal:  No pain, cramping, jaundice, change to eating and bowel habits  Urinary:  No pain, bleeding or change in continence  Genitalia: No pain, bleeding or discharge  Musculoskeletal:  No redness, pain, edema or weakness  Skin:  No pruritus, rash, change to nodules or lesions  Neurologic:  No discomfort, change in mental status, speech, sensory or motor activity  Psychiatric:  No change in concentration or change to affect or mood  Endocrine:  No hot flashes, increased thirst, or change to urine production  Hematologic: No petechiae, ecchymosis or bleeding  Lymphatic:  No lymphadenopathy or lymphedema  Allergy / Immunologic:  No eczema, hives, frequent or recurrent infections    OBJECTIVE        Physical    VITALS:  /74   Pulse 60   Temp 97.8 °F (36.6 °C) (Oral)   Resp 18   Ht 5' 8\" (1.727 m)   Wt 190 lb (86.2 kg)   SpO2 100%   BMI 28.89 kg/m²   TEMPERATURE:  Current - Temp: 97.8 °F (36.6 °C);  Max - Temp  Av.9 °F (36.6 °C)  Min: 97.5 °F (36.4 °C)  Max: 98.4 °F (36.9 °C)  PULSE OXIMETRY RANGE: SpO2  Av.6 %  Min: 98 %  Max: 100 %  24HR INTAKE/OUTPUT:    Intake/Output Summary (Last 24 hours) at 2021 0933  Last data filed at 2021 0159  Gross per 24 hour   Intake --   Output 2350 ml   Net -2350 ml       CONSTITUTIONAL: awake, alert, cooperative, no apparent distress count with Dr. Chance Caceres- no further work up   - Send BCR ABL and MARQUISE 2     Okay to dc later today if patient feels as though pain improved    Cannot give Oxy IR script on dc since he should have a supply at home. Was referred to pain mgmt doc Dr. Bárbara Conteh last week as well. Back pain   - generalized and patient denied wanting a muscle relaxer.      ONCOLOGIC DISPOSITION: 1-2 days       Tiburcio Hayward CNP   Please contact through [facility]

## 2021-08-09 NOTE — DISCHARGE SUMMARY
Hospital Discharge Summary     NAME: Taya Edwards    :  2000    MRN:  [id number]    Admission Details:     Admit date:  2021    Admitting Physician:  Mustapha Corona MD  Primary Care Physician:  Seb Flores MD    Discharge Details:     Discharge date:     Discharge Dispo: Stable/good   Discharge Diagnoses:    Patient Active Problem List   Diagnosis    [facility] crisis [facility]    Asthma    [facility]ick[facility] cell crisis ([facility]    Sepsis ([facility]    Opiate overdose ([facility]    Opiate antagonist poisoning, accidental or unintentional, initial encounter    Leukocytosis    Hypoxia    Anemia    Other chest pain    Pneumonia due to infectious organism    Fever    Chronic prescription opiate use       Hospital Course:   Admitted for brief crisis and treated with IVP Dilaudid and Toradol. Discharge Medications:       [facility] Medication Instructions [id number]    Printed on:21 7133   Medication Information                      albuterol sulfate HFA (PROAIR HFA) 108 (90 Base) MCG/ACT inhaler  Albuterol HFA Inhaler 90 mcg/actuation  inhaled  2 puffs prn     Active             docusate (COLACE, DULCOLAX) 100 MG CAPS  Take 100 mg by mouth             hydroxyurea (HYDREA) 500 MG chemo capsule  Take 2 capsules by mouth 2 times daily             ibuprofen (ADVIL;MOTRIN) 800 MG tablet  ibuprofen 800 MG Oral Tablet  oral   prn    Active             lactulose (CHRONULAC) 10 GM/15ML solution  Take 15 mLs by mouth as needed              levalbuterol (XOPENEX) 0.31 MG/3ML nebulization  Levalbuterol Nebulized    2 puffs prn     Active             mometasone-formoterol (DULERA) 100-5 MCG/ACT inhaler  Mometasone-Formoterol HFA Inhaler 100 mcg-5 mcg/actuation  inhaled  2 puffs every am     Active             oxyCODONE (OXYCONTIN) 20 MG extended release tablet  TAKE 1 TABLET BY MOUTH EVERY 12 HOURS.  TAKE WITH ENOUGH WATER TO SWALLOW WHOLE. DO NOT CRUSH/DISSOLVE/CHEW/CUT/BREAK. oxyCODONE HCl (OXY-IR) 10 MG immediate release tablet  Take 10 mg by mouth every 4 hours as needed for Pain. Consults:  IM     Significant Diagnostic Studies/Imaging:     XR CHEST (2 VW)    Result Date: 8/8/2021  EXAMINATION: TWO XRAY VIEWS OF THE CHEST 8/8/2021 4:27 am COMPARISON: 07/24/2021 radiograph HISTORY: ORDERING SYSTEM PROVIDED HISTORY: sickle cell crisis pain over ribs, back TECHNOLOGIST PROVIDED HISTORY: Reason for exam:->sickle cell crisis pain over ribs, back Reason for Exam: sickle cell pain crisis in ribs and back Acuity: Acute Type of Exam: Initial FINDINGS: The heart is enlarged. Mediastinum is normal.  Pulmonary vascular markings are normal.  The lungs are clear. There are no significant skeletal findings. Cardiomegaly with no acute finding in the chest.     XR LUMBAR SPINE (2-3 VIEWS)    Result Date: 7/26/2021  EXAMINATION: THREE XRAY VIEWS OF THE LUMBAR SPINE 7/26/2021 12:25 pm COMPARISON: Lumbar spine radiographs June 9, 2020. HISTORY: ORDERING SYSTEM PROVIDED HISTORY: sickle cell pain, eval for infarction TECHNOLOGIST PROVIDED HISTORY: Reason for exam:->sickle cell pain, eval for infarction Reason for Exam: left knee, back, rib pain no injury Acuity: Acute Type of Exam: Initial FINDINGS: Lumbar vertebrae demonstrate normal height and alignment. No fracture, subluxation, or suspicious osseous lesion identified. No infarct identified on radiographic evaluation. Disc spaces are maintained. No significant degenerative changes. Soft tissues are without acute process. No significant change compared to prior. No acute osseous abnormality. XR CLAVICLE RIGHT    Result Date: 7/29/2021  EXAMINATION: TWO XRAY VIEWS OF THE RIGHT CLAVICLE 7/29/2021 12:17 pm COMPARISON: None.  HISTORY: ORDERING SYSTEM PROVIDED HISTORY: sickle cell pain, eval infarct TECHNOLOGIST PROVIDED HISTORY: Reason for exam:->sickle cell pain, eval infarct pneumothorax. The cardiomediastinal silhouette is stable. The osseous structures are stable. No acute process.        Treatments:      IV hydration   IV Dilaudid   IV Toradol     Disposition:     Home   Follow up with [facility] this week for infusion on Friday 8/13       Signed:     8/9/2021  11:57 AM    Chemo Hamilton CNP   Medical Oncology/Hematology    [address], [facility] [place]us Nagy   Phone: [phone]  Fax: [phone]

## 2021-08-09 NOTE — PLAN OF CARE
Problem: Pain:  Goal: Pain level will decrease  Description: Pain level will decrease  8/9/2021 1131 by Luis Tipton RN  Outcome: Completed  7/7/5612 2593 by Shanna Escalona RN  Outcome: Ongoing     Problem: Infection:  Goal: Will remain free from infection  Description: Will remain free from infection  Outcome: Completed

## 2021-08-25 ENCOUNTER — HOSPITAL ENCOUNTER (INPATIENT)
Age: 21
LOS: 1 days | Discharge: HOME | DRG: 812 | End: 2021-08-26
Attending: EMERGENCY MEDICINE | Admitting: INTERNAL MEDICINE
Payer: COMMERCIAL

## 2021-08-25 ENCOUNTER — APPOINTMENT (OUTPATIENT)
Dept: CT IMAGING | Age: 21
DRG: 812 | End: 2021-08-25
Payer: COMMERCIAL

## 2021-08-25 ENCOUNTER — APPOINTMENT (OUTPATIENT)
Dept: GENERAL RADIOLOGY | Age: 21
DRG: 812 | End: 2021-08-25
Payer: COMMERCIAL

## 2021-08-25 DIAGNOSIS — D57.00 SICKLE CELL PAIN CRISIS (HCC): Primary | ICD-10-CM

## 2021-08-25 DIAGNOSIS — D72.829 LEUKOCYTOSIS, UNSPECIFIED TYPE: ICD-10-CM

## 2021-08-25 LAB
A/G RATIO: 1.2 (ref 1.1–2.2)
ALBUMIN SERPL-MCNC: 4.6 G/DL (ref 3.4–5)
ALP BLD-CCNC: 174 U/L (ref 40–129)
ALT SERPL-CCNC: 45 U/L (ref 10–40)
ANION GAP SERPL CALCULATED.3IONS-SCNC: 9 MMOL/L (ref 3–16)
AST SERPL-CCNC: 37 U/L (ref 15–37)
BASOPHILS ABSOLUTE: 0.2 K/UL (ref 0–0.2)
BASOPHILS RELATIVE PERCENT: 1 %
BILIRUB SERPL-MCNC: 3.6 MG/DL (ref 0–1)
BUN BLDV-MCNC: 11 MG/DL (ref 7–20)
CALCIUM SERPL-MCNC: 9.6 MG/DL (ref 8.3–10.6)
CHLORIDE BLD-SCNC: 105 MMOL/L (ref 99–110)
CO2: 25 MMOL/L (ref 21–32)
CREAT SERPL-MCNC: 0.7 MG/DL (ref 0.9–1.3)
EOSINOPHILS ABSOLUTE: 0.2 K/UL (ref 0–0.6)
EOSINOPHILS RELATIVE PERCENT: 0.9 %
GFR AFRICAN AMERICAN: >60
GFR NON-AFRICAN AMERICAN: >60
GLOBULIN: 3.7 G/DL
GLUCOSE BLD-MCNC: 95 MG/DL (ref 70–99)
HCT VFR BLD CALC: 27.3 % (ref 40.5–52.5)
HEMOGLOBIN: 9.3 G/DL (ref 13.5–17.5)
IMMATURE RETIC FRACT: 0.61 (ref 0.21–0.37)
LYMPHOCYTES ABSOLUTE: 3.8 K/UL (ref 1–5.1)
LYMPHOCYTES RELATIVE PERCENT: 21.5 %
MCH RBC QN AUTO: 34.9 PG (ref 26–34)
MCHC RBC AUTO-ENTMCNC: 34.2 G/DL (ref 31–36)
MCV RBC AUTO: 102.1 FL (ref 80–100)
MONOCYTES ABSOLUTE: 1.6 K/UL (ref 0–1.3)
MONOCYTES RELATIVE PERCENT: 8.8 %
NEUTROPHILS ABSOLUTE: 12.1 K/UL (ref 1.7–7.7)
NEUTROPHILS RELATIVE PERCENT: 67.8 %
PDW BLD-RTO: 22.1 % (ref 12.4–15.4)
PLATELET # BLD: 383 K/UL (ref 135–450)
PMV BLD AUTO: 8.8 FL (ref 5–10.5)
POTASSIUM SERPL-SCNC: 4.1 MMOL/L (ref 3.5–5.1)
RBC # BLD: 2.67 M/UL (ref 4.2–5.9)
RETICULOCYTE ABSOLUTE COUNT: 0.27 M/UL
RETICULOCYTE COUNT PCT: 9.9 % (ref 0.5–2.18)
SODIUM BLD-SCNC: 139 MMOL/L (ref 136–145)
SPECIMEN STATUS: NORMAL
TOTAL PROTEIN: 8.3 G/DL (ref 6.4–8.2)
WBC # BLD: 17.8 K/UL (ref 4–11)

## 2021-08-25 PROCEDURE — 2580000003 HC RX 258: Performed by: INTERNAL MEDICINE

## 2021-08-25 PROCEDURE — 99284 EMERGENCY DEPT VISIT MOD MDM: CPT

## 2021-08-25 PROCEDURE — 96375 TX/PRO/DX INJ NEW DRUG ADDON: CPT

## 2021-08-25 PROCEDURE — 6360000002 HC RX W HCPCS: Performed by: INTERNAL MEDICINE

## 2021-08-25 PROCEDURE — 6360000002 HC RX W HCPCS: Performed by: NURSE PRACTITIONER

## 2021-08-25 PROCEDURE — 2580000003 HC RX 258: Performed by: NURSE PRACTITIONER

## 2021-08-25 PROCEDURE — 6370000000 HC RX 637 (ALT 250 FOR IP): Performed by: NURSE PRACTITIONER

## 2021-08-25 PROCEDURE — 96361 HYDRATE IV INFUSION ADD-ON: CPT

## 2021-08-25 PROCEDURE — 2580000003 HC RX 258: Performed by: EMERGENCY MEDICINE

## 2021-08-25 PROCEDURE — 96376 TX/PRO/DX INJ SAME DRUG ADON: CPT

## 2021-08-25 PROCEDURE — 72128 CT CHEST SPINE W/O DYE: CPT

## 2021-08-25 PROCEDURE — 94640 AIRWAY INHALATION TREATMENT: CPT

## 2021-08-25 PROCEDURE — G0378 HOSPITAL OBSERVATION PER HR: HCPCS

## 2021-08-25 PROCEDURE — 85045 AUTOMATED RETICULOCYTE COUNT: CPT

## 2021-08-25 PROCEDURE — 6360000002 HC RX W HCPCS: Performed by: EMERGENCY MEDICINE

## 2021-08-25 PROCEDURE — 85025 COMPLETE CBC W/AUTO DIFF WBC: CPT

## 2021-08-25 PROCEDURE — 71046 X-RAY EXAM CHEST 2 VIEWS: CPT

## 2021-08-25 PROCEDURE — 6370000000 HC RX 637 (ALT 250 FOR IP): Performed by: INTERNAL MEDICINE

## 2021-08-25 PROCEDURE — 80053 COMPREHEN METABOLIC PANEL: CPT

## 2021-08-25 PROCEDURE — 1200000000 HC SEMI PRIVATE

## 2021-08-25 PROCEDURE — 96374 THER/PROPH/DIAG INJ IV PUSH: CPT

## 2021-08-25 RX ORDER — SODIUM CHLORIDE 0.9 % (FLUSH) 0.9 %
5-40 SYRINGE (ML) INJECTION EVERY 12 HOURS SCHEDULED
Status: DISCONTINUED | OUTPATIENT
Start: 2021-08-25 | End: 2021-08-26 | Stop reason: HOSPADM

## 2021-08-25 RX ORDER — 0.9 % SODIUM CHLORIDE 0.9 %
1000 INTRAVENOUS SOLUTION INTRAVENOUS ONCE
Status: COMPLETED | OUTPATIENT
Start: 2021-08-25 | End: 2021-08-25

## 2021-08-25 RX ORDER — ONDANSETRON 4 MG/1
8 TABLET, ORALLY DISINTEGRATING ORAL EVERY 8 HOURS
Status: DISCONTINUED | OUTPATIENT
Start: 2021-08-25 | End: 2021-08-26 | Stop reason: HOSPADM

## 2021-08-25 RX ORDER — OXYCODONE HYDROCHLORIDE 5 MG/1
10 TABLET ORAL EVERY 4 HOURS PRN
Status: DISCONTINUED | OUTPATIENT
Start: 2021-08-25 | End: 2021-08-25 | Stop reason: SDUPTHER

## 2021-08-25 RX ORDER — ALBUTEROL SULFATE 90 UG/1
2 AEROSOL, METERED RESPIRATORY (INHALATION) EVERY 4 HOURS PRN
Status: DISCONTINUED | OUTPATIENT
Start: 2021-08-25 | End: 2021-08-26 | Stop reason: HOSPADM

## 2021-08-25 RX ORDER — KETOROLAC TROMETHAMINE 30 MG/ML
15 INJECTION, SOLUTION INTRAMUSCULAR; INTRAVENOUS EVERY 8 HOURS
Status: DISCONTINUED | OUTPATIENT
Start: 2021-08-26 | End: 2021-08-25 | Stop reason: SDUPTHER

## 2021-08-25 RX ORDER — DEXTROSE AND SODIUM CHLORIDE 5; .45 G/100ML; G/100ML
INJECTION, SOLUTION INTRAVENOUS CONTINUOUS
Status: DISCONTINUED | OUTPATIENT
Start: 2021-08-25 | End: 2021-08-26 | Stop reason: HOSPADM

## 2021-08-25 RX ORDER — HYDROMORPHONE HCL 110MG/55ML
1 PATIENT CONTROLLED ANALGESIA SYRINGE INTRAVENOUS EVERY 4 HOURS PRN
Status: DISCONTINUED | OUTPATIENT
Start: 2021-08-25 | End: 2021-08-25 | Stop reason: SDUPTHER

## 2021-08-25 RX ORDER — SODIUM CHLORIDE 9 MG/ML
25 INJECTION, SOLUTION INTRAVENOUS PRN
Status: DISCONTINUED | OUTPATIENT
Start: 2021-08-25 | End: 2021-08-26 | Stop reason: HOSPADM

## 2021-08-25 RX ORDER — KETOROLAC TROMETHAMINE 30 MG/ML
15 INJECTION, SOLUTION INTRAMUSCULAR; INTRAVENOUS ONCE
Status: COMPLETED | OUTPATIENT
Start: 2021-08-25 | End: 2021-08-25

## 2021-08-25 RX ORDER — BUDESONIDE AND FORMOTEROL FUMARATE DIHYDRATE 80; 4.5 UG/1; UG/1
2 AEROSOL RESPIRATORY (INHALATION) 2 TIMES DAILY
Status: DISCONTINUED | OUTPATIENT
Start: 2021-08-25 | End: 2021-08-26 | Stop reason: HOSPADM

## 2021-08-25 RX ORDER — SODIUM CHLORIDE 9 MG/ML
1000 INJECTION, SOLUTION INTRAVENOUS ONCE
Status: COMPLETED | OUTPATIENT
Start: 2021-08-25 | End: 2021-08-25

## 2021-08-25 RX ORDER — HYDROMORPHONE HCL 110MG/55ML
2 PATIENT CONTROLLED ANALGESIA SYRINGE INTRAVENOUS EVERY 4 HOURS PRN
Status: DISCONTINUED | OUTPATIENT
Start: 2021-08-25 | End: 2021-08-25 | Stop reason: SDUPTHER

## 2021-08-25 RX ORDER — ONDANSETRON 2 MG/ML
4 INJECTION INTRAMUSCULAR; INTRAVENOUS EVERY 6 HOURS PRN
Status: DISCONTINUED | OUTPATIENT
Start: 2021-08-25 | End: 2021-08-26 | Stop reason: HOSPADM

## 2021-08-25 RX ORDER — ACETAMINOPHEN 650 MG/1
650 SUPPOSITORY RECTAL EVERY 6 HOURS PRN
Status: DISCONTINUED | OUTPATIENT
Start: 2021-08-25 | End: 2021-08-26 | Stop reason: HOSPADM

## 2021-08-25 RX ORDER — OXYCODONE HCL 20 MG/1
20 TABLET, FILM COATED, EXTENDED RELEASE ORAL EVERY 12 HOURS SCHEDULED
Status: DISCONTINUED | OUTPATIENT
Start: 2021-08-25 | End: 2021-08-26 | Stop reason: HOSPADM

## 2021-08-25 RX ORDER — FOLIC ACID 1 MG/1
2 TABLET ORAL DAILY
Status: DISCONTINUED | OUTPATIENT
Start: 2021-08-25 | End: 2021-08-26 | Stop reason: HOSPADM

## 2021-08-25 RX ORDER — SODIUM CHLORIDE 0.9 % (FLUSH) 0.9 %
5-40 SYRINGE (ML) INJECTION PRN
Status: DISCONTINUED | OUTPATIENT
Start: 2021-08-25 | End: 2021-08-26 | Stop reason: HOSPADM

## 2021-08-25 RX ORDER — POLYETHYLENE GLYCOL 3350 17 G/17G
17 POWDER, FOR SOLUTION ORAL DAILY PRN
Status: DISCONTINUED | OUTPATIENT
Start: 2021-08-25 | End: 2021-08-26 | Stop reason: HOSPADM

## 2021-08-25 RX ORDER — SODIUM CHLORIDE 9 MG/ML
INJECTION, SOLUTION INTRAVENOUS CONTINUOUS
Status: DISCONTINUED | OUTPATIENT
Start: 2021-08-25 | End: 2021-08-25

## 2021-08-25 RX ORDER — ACETAMINOPHEN 325 MG/1
650 TABLET ORAL EVERY 6 HOURS PRN
Status: DISCONTINUED | OUTPATIENT
Start: 2021-08-25 | End: 2021-08-26 | Stop reason: HOSPADM

## 2021-08-25 RX ORDER — LACTULOSE 10 G/15ML
15 SOLUTION ORAL PRN
Status: DISCONTINUED | OUTPATIENT
Start: 2021-08-25 | End: 2021-08-26 | Stop reason: HOSPADM

## 2021-08-25 RX ORDER — HYDROXYUREA 500 MG/1
1000 CAPSULE ORAL DAILY
Status: DISCONTINUED | OUTPATIENT
Start: 2021-08-25 | End: 2021-08-26 | Stop reason: HOSPADM

## 2021-08-25 RX ORDER — HYDROXYUREA 500 MG/1
1000 CAPSULE ORAL 2 TIMES DAILY
Status: DISCONTINUED | OUTPATIENT
Start: 2021-08-25 | End: 2021-08-25 | Stop reason: SDUPTHER

## 2021-08-25 RX ORDER — OXYCODONE HCL 10 MG/1
20 TABLET, FILM COATED, EXTENDED RELEASE ORAL EVERY 12 HOURS SCHEDULED
Status: DISCONTINUED | OUTPATIENT
Start: 2021-08-25 | End: 2021-08-25 | Stop reason: SDUPTHER

## 2021-08-25 RX ORDER — OXYCODONE HYDROCHLORIDE 5 MG/1
20 TABLET ORAL EVERY 4 HOURS PRN
Status: DISCONTINUED | OUTPATIENT
Start: 2021-08-25 | End: 2021-08-26 | Stop reason: HOSPADM

## 2021-08-25 RX ORDER — ONDANSETRON 4 MG/1
4 TABLET, ORALLY DISINTEGRATING ORAL EVERY 8 HOURS PRN
Status: DISCONTINUED | OUTPATIENT
Start: 2021-08-25 | End: 2021-08-26 | Stop reason: HOSPADM

## 2021-08-25 RX ORDER — KETOROLAC TROMETHAMINE 30 MG/ML
30 INJECTION, SOLUTION INTRAMUSCULAR; INTRAVENOUS EVERY 12 HOURS
Status: DISCONTINUED | OUTPATIENT
Start: 2021-08-25 | End: 2021-08-26 | Stop reason: HOSPADM

## 2021-08-25 RX ORDER — HYDROMORPHONE HCL 110MG/55ML
2 PATIENT CONTROLLED ANALGESIA SYRINGE INTRAVENOUS
Status: DISCONTINUED | OUTPATIENT
Start: 2021-08-25 | End: 2021-08-26 | Stop reason: HOSPADM

## 2021-08-25 RX ADMIN — OXYCODONE 20 MG: 5 TABLET ORAL at 17:21

## 2021-08-25 RX ADMIN — OXYCODONE 20 MG: 5 TABLET ORAL at 12:00

## 2021-08-25 RX ADMIN — SODIUM CHLORIDE 1000 ML: 9 INJECTION, SOLUTION INTRAVENOUS at 07:48

## 2021-08-25 RX ADMIN — OXYCODONE 20 MG: 5 TABLET ORAL at 21:45

## 2021-08-25 RX ADMIN — KETOROLAC TROMETHAMINE 15 MG: 30 INJECTION, SOLUTION INTRAMUSCULAR; INTRAVENOUS at 06:41

## 2021-08-25 RX ADMIN — SODIUM CHLORIDE 1000 ML: 9 INJECTION, SOLUTION INTRAVENOUS at 06:46

## 2021-08-25 RX ADMIN — HYDROMORPHONE HYDROCHLORIDE 2 MG: 2 INJECTION, SOLUTION INTRAMUSCULAR; INTRAVENOUS; SUBCUTANEOUS at 19:57

## 2021-08-25 RX ADMIN — FOLIC ACID 2 MG: 1 TABLET ORAL at 13:24

## 2021-08-25 RX ADMIN — HYDROMORPHONE HYDROCHLORIDE 2 MG: 2 INJECTION, SOLUTION INTRAMUSCULAR; INTRAVENOUS; SUBCUTANEOUS at 23:09

## 2021-08-25 RX ADMIN — DEXTROSE AND SODIUM CHLORIDE: 5; 450 INJECTION, SOLUTION INTRAVENOUS at 13:23

## 2021-08-25 RX ADMIN — HYDROXYUREA 1000 MG: 500 CAPSULE ORAL at 17:14

## 2021-08-25 RX ADMIN — HYDROMORPHONE HYDROCHLORIDE 2 MG: 2 INJECTION, SOLUTION INTRAMUSCULAR; INTRAVENOUS; SUBCUTANEOUS at 13:31

## 2021-08-25 RX ADMIN — Medication 2 PUFF: at 19:43

## 2021-08-25 RX ADMIN — HYDROMORPHONE HYDROCHLORIDE 1 MG: 1 INJECTION, SOLUTION INTRAMUSCULAR; INTRAVENOUS; SUBCUTANEOUS at 06:41

## 2021-08-25 RX ADMIN — SODIUM CHLORIDE: 9 INJECTION, SOLUTION INTRAVENOUS at 12:53

## 2021-08-25 RX ADMIN — HYDROMORPHONE HYDROCHLORIDE 1 MG: 1 INJECTION, SOLUTION INTRAMUSCULAR; INTRAVENOUS; SUBCUTANEOUS at 07:48

## 2021-08-25 RX ADMIN — HYDROMORPHONE HYDROCHLORIDE 1 MG: 1 INJECTION, SOLUTION INTRAMUSCULAR; INTRAVENOUS; SUBCUTANEOUS at 09:40

## 2021-08-25 RX ADMIN — ONDANSETRON 8 MG: 4 TABLET, ORALLY DISINTEGRATING ORAL at 12:46

## 2021-08-25 RX ADMIN — OXYCODONE HYDROCHLORIDE 20 MG: 20 TABLET, FILM COATED, EXTENDED RELEASE ORAL at 13:24

## 2021-08-25 RX ADMIN — KETOROLAC TROMETHAMINE 30 MG: 30 INJECTION, SOLUTION INTRAMUSCULAR; INTRAVENOUS at 12:46

## 2021-08-25 ASSESSMENT — PAIN SCALES - GENERAL
PAINLEVEL_OUTOF10: 8
PAINLEVEL_OUTOF10: 9
PAINLEVEL_OUTOF10: 8
PAINLEVEL_OUTOF10: 7
PAINLEVEL_OUTOF10: 8
PAINLEVEL_OUTOF10: 7

## 2021-08-25 ASSESSMENT — ENCOUNTER SYMPTOMS
WHEEZING: 0
COUGH: 0
SHORTNESS OF BREATH: 0
RHINORRHEA: 0
NAUSEA: 0
BACK PAIN: 1
ABDOMINAL PAIN: 0
VOMITING: 0
PHOTOPHOBIA: 0
DIARRHEA: 0

## 2021-08-25 NOTE — RT PROTOCOL NOTE

## 2021-08-25 NOTE — PROGRESS NOTES
Report received from [facility] in ER. Pt transported in stable condition to [facility]. Pt A&O x4, VSS. Talkative and pleasant. C/o 8/10 back pain; waiting for orders to be verified. Denies N/V; BS active x4; passing flatus. Denies dyspnea. Call light within reach. Bed side table within reach. Wheels locked. Bed in lowest position. Declined bed alarm; independent with ADLs. Pt instructed to call out for assistance. Pt expressed understanding & calls out appropriately. Admission charting began. All care conts per orders.     Electronically signed by Venkat Davis RN on 8/25/2021 at 6:44 PM

## 2021-08-25 NOTE — H&P
Hospital Medicine History & Physical      PCP: Jayne Skinner MD    Date of Admission: 8/25/2021    Date of Service: Pt seen/examined on 8/25/2021 and Admitted to Inpatient with expected LOS greater than two midnights due to medical therapy. Chief Complaint: Chest pain    History Of Present Illness:   24 y.o. male who presented to [facility] with chest pain. PMHx significant for sickle cell disease and frequent exacerbations. He was recently hospitalized here. He presented with moderate to severe back pain and diffuse joint pains. He denies any shortness of breath. Chest x-ray was clear. CT spine demonstrated no acute bony abnormalities but showed a small groundglass nodule in the right lower lobe. He was afebrile. 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 Authorizing Provider   oxyCODONE HCl (OXY-IR) 10 MG immediate release tablet Take 10 mg by mouth every 4 hours as needed for Pain.     Historical Provider, MD   albuterol sulfate HFA (PROAIR HFA) 108 (90 Base) MCG/ACT inhaler Albuterol HFA Inhaler 90 mcg/actuation  inhaled  2 puffs prn     Active    Historical Provider, MD   mometasone-formoterol (DULERA) 100-5 MCG/ACT inhaler Mometasone-Formoterol HFA Inhaler 100 mcg-5 mcg/actuation  inhaled  2 puffs every am     Active    Historical Provider, MD   levalbuterol (XOPENEX) 0.31 MG/3ML nebulization Levalbuterol Nebulized    2 puffs prn     Active    Historical Provider, MD   lactulose (CHRONULAC) 10 GM/15ML solution Take 15 mLs by mouth as needed     Historical Provider, MD   docusate (COLACE, DULCOLAX) 100 MG CAPS Take 100 mg by mouth    Historical Provider, MD   ibuprofen (ADVIL;MOTRIN) 800 MG tablet ibuprofen 800 MG Oral Tablet  oral   prn    Active    Historical Provider, MD   oxyCODONE (OXYCONTIN) 20 MG extended release tablet TAKE 1 TABLET BY MOUTH EVERY 12 HOURS. TAKE WITH ENOUGH WATER TO SWALLOW WHOLE. DO NOT CRUSH/DISSOLVE/CHEW/CUT/BREAK. 4/12/21   Historical Provider, MD   hydroxyurea (HYDREA) 500 MG chemo capsule Take 2 capsules by mouth 2 times daily 12/9/20   Brooklynn Kwon MD       Allergies:  Morphine    Social History:    TOBACCO:   reports that he has never smoked. He has never used smokeless tobacco.  ETOH:   reports previous alcohol use. E-Cigarettes/Vaping Use     Questions Responses    E-Cigarette/Vaping Use Never User    Start Date     Passive Exposure     Quit Date     Counseling Given     Comments             Family History:      History reviewed. No pertinent family history. REVIEW OF SYSTEMS:   Pertinent positives as noted in the HPI. All other systems reviewed and negative. Physical Exam Performed:    /76   Pulse 86   Temp 98.1 °F (36.7 °C) (Axillary)   Resp 18   Ht 5' 8\" (1.727 m)   Wt 180 lb (81.6 kg)   SpO2 97%   BMI 27.37 kg/m²     General appearance: Uncomfortable, appears stated age and cooperative. HEENT:  Normal cephalic, atraumatic without obvious deformity. Pupils equal, round, and reactive to light. Extra ocular muscles intact. Conjunctivae/corneas clear. Neck: Supple, no jugular venous distention. Trachea midline with full range of motion. Respiratory:  Normal respiratory effort. Clear to auscultation, bilaterally without Rales/Wheezes/Rhonchi. Cardiovascular: Regular rate and rhythm with normal S1/S2 without murmurs, rubs or gallops. Abdomen: Soft, non-tender, non-distended with normal bowel sounds. Musculoskelatal: No clubbing, cyanosis or edema bilaterally. Full range of motion without deformity. Neurologic:  Neurovascularly intact without any focal sensory/motor deficits.  Cranial nerves: II-XII intact, grossly non-focal.  Psychiatric: Alert and oriented, thought content appropriate, normal insight  Skin: Skin color, texture, turgor normal.  No rashes or lesions. Capillary Refill: Brisk,< 3 seconds   Peripheral Pulses: +2 palpable, equal bilaterally       Labs:     Recent Labs     08/25/21  0549   WBC 17.8*   HGB 9.3*   HCT 27.3*        Recent Labs     08/25/21  0549      K 4.1      CO2 25   BUN 11   CREATININE 0.7*   CALCIUM 9.6     Recent Labs     08/25/21  0549   AST 37   ALT 45*   BILITOT 3.6*   ALKPHOS 174*     No results for input(s): INR in the last 72 hours. No results for input(s): Nava Pace in the last 72 hours. Radiology:     CXR: I have reviewed the CXR with the following interpretation: Clear    XR CHEST (2 VW)    Result Date: 8/25/2021  EXAMINATION: TWO XRAY VIEWS OF THE CHEST 8/25/2021 6:36 am COMPARISON: 08/08/2021 and prior HISTORY: ORDERING SYSTEM PROVIDED HISTORY: sickle cell crisis TECHNOLOGIST PROVIDED HISTORY: Reason for exam:->sickle cell crisis Reason for Exam: Chest pain; sickle cell crisis Acuity: Acute Type of Exam: Initial FINDINGS: Borderline cardiomegaly is noted. Pulmonary vascularity appears within normal limits. The lungs are clear. No pleural effusion is noted. Osseous structures demonstrate no acute abnormality     No acute process     XR CHEST (2 VW)    Result Date: 8/8/2021  EXAMINATION: TWO XRAY VIEWS OF THE CHEST 8/8/2021 4:27 am COMPARISON: 07/24/2021 radiograph HISTORY: ORDERING SYSTEM PROVIDED HISTORY: sickle cell crisis pain over ribs, back TECHNOLOGIST PROVIDED HISTORY: Reason for exam:->sickle cell crisis pain over ribs, back Reason for Exam: sickle cell pain crisis in ribs and back Acuity: Acute Type of Exam: Initial FINDINGS: The heart is enlarged. Mediastinum is normal.  Pulmonary vascular markings are normal.  The lungs are clear. There are no significant skeletal findings.      Cardiomegaly with no acute finding in the chest.     XR CLAVICLE RIGHT    Result Date: 7/29/2021  EXAMINATION: TWO XRAY VIEWS OF THE RIGHT CLAVICLE 7/29/2021 12:17 pm COMPARISON: None. HISTORY: ORDERING SYSTEM PROVIDED HISTORY: sickle cell pain, eval infarct TECHNOLOGIST PROVIDED HISTORY: Reason for exam:->sickle cell pain, eval infarct Reason for Exam: sickle cell pain Acuity: Chronic Type of Exam: Ongoing FINDINGS: There is no acute fracture or dislocation. AC joint is unremarkable without separation. The glenohumeral joint is unremarkable. Surrounding soft tissues are unremarkable. Included adjacent lung and upper right ribs are unremarkable. No acute clavicle abnormality. CT THORACIC SPINE WO CONTRAST    Result Date: 8/25/2021  EXAMINATION: CT OF THE THORACIC SPINE WITHOUT CONTRAST  8/25/2021 7:53 am: TECHNIQUE: CT of the thoracic spine was performed without the administration of intravenous contrast. Multiplanar reformatted images are provided for review. Dose modulation, iterative reconstruction, and/or weight based adjustment of the mA/kV was utilized to reduce the radiation dose to as low as reasonably achievable. COMPARISON: April 2020 HISTORY: ORDERING SYSTEM PROVIDED HISTORY: sickle cell pain, leukocytotis, eval for bony infarct TECHNOLOGIST PROVIDED HISTORY: Reason for exam:->sickle cell pain, leukocytotis, eval for bony infarct Reason for Exam: sickle cell pain in upper back since 10 pm Acuity: Acute Type of Exam: Initial FINDINGS: BONES/ALIGNMENT: There is normal alignment of the spine. The vertebral body heights are maintained. No osseous destructive lesion is seen. There is a subtle area of sclerosis seen in the T3 vertebral body. This is unchanged. DEGENERATIVE CHANGES: No gross spinal canal stenosis or bony neural foraminal narrowing of the thoracic spine.  SOFT TISSUES: Limited images of the thyroid gland appear normal.  Small ground-glass nodule is seen in the right lower lobe measuring 5 mm, likely

## 2021-08-25 NOTE — ED NOTES
Ps onc/hem [id number]  Re: existing pt of , sickle cell crisis, leukocytosis per [email or web address] [phone] 08/25/21 0925

## 2021-08-25 NOTE — PLAN OF CARE
Problem: Falls - Risk of:  Goal: Will remain free from falls  Description: Will remain free from falls  Outcome: Ongoing  Goal: Absence of physical injury  Description: Absence of physical injury  Outcome: Ongoing     Problem: Falls - Risk of: Intervention: Assess risk factors for falls  Note: Pt is medium fall risk  Intervention: Postfall assessment  Note: No falls sustained thus far this shift. Intervention: Manage a safe environment  Note: Call light within reach. Bed side table within reach. Wheels locked. Bed in lowest position. Declined bed alarm; independent with ADLs. Pt instructed to call out for assistance. Pt expressed understanding & calls out appropriately. Intervention: Toileting assistance  Note: Independent with toileting needs.

## 2021-08-25 NOTE — ED PROVIDER NOTES
Emergency Department Provider Note  Location: [place] [address] TELE/MED SURG/ONC  8/25/2021     Patient Identification  Hiwot Hoskins is a 24 y.o. male    Chief Complaint  Sickle Cell Pain Crisis (Pt states he started having pain in upper back and both arms that started yesterday around 2200. )          HPI  (History provided by patient)  This patient is a 54-year-old male with history of sickle cell disease, asthma who presents with bilateral upper extremity pains and upper back pain/achiness over the past day. Started yesterday evening. No exacerbating relieving factors. It is not pleuritic it is not exertional.  Patient took his oxycodone at home for what he thought was consistent with sickle cell pains does not adequately controlled it. Patient states he normally gets pain in his upper extremities and part of his back/spine. This is more upper back but he has had pain like this before in the setting of sickle cell pain crisis. He denies any recent fevers no shortness of breath or chest pain. Patient reports that he thinks he is dehydrated. I have reviewed the following nursing documentation:  Allergies: Allergies   Allergen Reactions    Morphine Shortness Of Breath     Other reaction(s): Histamine-like Reaction  Has asthma exacerbation with morphine-histamine type reaction         Past medical history:  has a past medical history of Accidental overdose, Asthma, Enlarged heart, 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 ([address] and Sickle cell anemia ([facility] [address] Past surgical history:  has a past surgical history that includes Splenectomy. Home medications:   Prior to Admission medications    Medication Sig Start Date End Date Taking? Authorizing Provider   oxyCODONE HCl (OXY-IR) 10 MG immediate release tablet Take 10 mg by mouth every 4 hours as needed for Pain.     Historical Provider, MD   albuterol sulfate HFA (PROAIR HFA) 108 (90 Base) MCG/ACT inhaler Albuterol HFA Inhaler 90 mcg/actuation  inhaled 2 puffs prn     Active    Historical Provider, MD   mometasone-formoterol (DULERA) 100-5 MCG/ACT inhaler Mometasone-Formoterol HFA Inhaler 100 mcg-5 mcg/actuation  inhaled  2 puffs every am     Active    Historical Provider, MD   levalbuterol (XOPENEX) 0.31 MG/3ML nebulization Levalbuterol Nebulized    2 puffs prn     Active    Historical Provider, MD   lactulose (CHRONULAC) 10 GM/15ML solution Take 15 mLs by mouth as needed     Historical Provider, MD   docusate (COLACE, DULCOLAX) 100 MG CAPS Take 100 mg by mouth    Historical Provider, MD   ibuprofen (ADVIL;MOTRIN) 800 MG tablet ibuprofen 800 MG Oral Tablet  oral   prn    Active    Historical Provider, MD   oxyCODONE (OXYCONTIN) 20 MG extended release tablet TAKE 1 TABLET BY MOUTH EVERY 12 HOURS. TAKE WITH ENOUGH WATER TO SWALLOW WHOLE. DO NOT CRUSH/DISSOLVE/CHEW/CUT/BREAK. 4/12/21   Historical Provider, MD   hydroxyurea (HYDREA) 500 MG chemo capsule Take 2 capsules by mouth 2 times daily 12/9/20   Aidan South MD       Social history:  reports that he has never smoked. He has never used smokeless tobacco. He reports previous alcohol use. He reports that he does not use drugs. Family history:  History reviewed. No pertinent family history. ROS  Review of Systems   Constitutional: Negative for chills and fever. HENT: Negative for congestion and rhinorrhea. Eyes: Negative for photophobia and visual disturbance. Respiratory: Negative for cough, shortness of breath and wheezing. Cardiovascular: Negative for chest pain and palpitations. Gastrointestinal: Negative for abdominal pain, diarrhea, nausea and vomiting. Genitourinary: Negative for dysuria and hematuria. Musculoskeletal: Positive for arthralgias and back pain. Negative for neck pain. Skin: Negative for rash and wound. Neurological: Negative for syncope and weakness. Psychiatric/Behavioral: Negative for agitation and confusion.          Exam  ED Triage Vitals [08/25/21 0524]   BP Temp Temp Source Pulse Resp SpO2 Height Weight   120/74 98.3 °F (36.8 °C) Oral 62 18 99 % 5' 8\" (1.727 m) 180 lb (81.6 kg)       Physical Exam  Vitals and nursing note reviewed. Constitutional:       General: He is not in acute distress. Appearance: He is well-developed. HENT:      Head: Normocephalic and atraumatic. Nose: Nose normal. No congestion. Eyes:      Extraocular Movements: Extraocular movements intact. Pupils: Pupils are equal, round, and reactive to light. Cardiovascular:      Rate and Rhythm: Normal rate and regular rhythm. Heart sounds: No murmur heard. Pulmonary:      Effort: Pulmonary effort is normal.      Breath sounds: Normal breath sounds. Abdominal:      General: There is no distension. Palpations: Abdomen is soft. Tenderness: There is no abdominal tenderness. Musculoskeletal:         General: No swelling, tenderness or deformity. Normal range of motion. Cervical back: Normal range of motion and neck supple. Comments: No midline tenderness of spine no step-offs abrasions hematomas or objective evidence of trauma. Ranging all extremities equally, no deformities noted, neurovascularly intact extremities. Skin:     General: Skin is warm. Findings: No rash. Neurological:      Mental Status: He is alert and oriented to person, place, and time. Motor: No abnormal muscle tone.       Coordination: Coordination normal.   Psychiatric:         Mood and Affect: Mood normal.         Behavior: Behavior normal.           ED Course    ED Medication Orders (From admission, onward)    Start Ordered     Status Ordering Provider    08/26/21 0900 08/25/21 1229  enoxaparin (LOVENOX) injection 40 mg  DAILY      Last MAR action: Not Given - by Mohan Moreau on 08/26/21 at [address]    08/25/21 2100 08/25/21 1229  sodium chloride flush 0.9 % injection 5-40 mL  2 times per day      Last MAR action: Given - by Mohan Moreau on 08/26/21 at [address]    08/25/21 1400 08/25/21 1135  hydroxyurea (HYDREA) chemo capsule 1,000 mg  DAILY      Last MAR action: Given - by Hyacinth Dunmore on 08/26/21 at [address] [facility]    08/25/21 1400 90/31/20 7617  folic acid (FOLVITE) tablet 2 mg  DAILY      Last MAR action: Given - by Hyacinth Dunmore on 08/26/21 at 1200 [facility]    08/25/21 1245 08/25/21 1229  budesonide-formoterol (SYMBICORT) 80-4.5 MCG/ACT inhaler 2 puff  2 TIMES DAILY      Last MAR action: Given - by Ross Chiu on 08/26/21 at [address] [facility] [address]    08/25/21 1245 08/25/21 1229  ketorolac (TORADOL) injection 30 mg  EVERY 12 HOURS      Last MAR action: Given - by Hyacinth Dunmore on 08/26/21 at [address]    08/25/21 1229 08/25/21 1229  ondansetron (ZOFRAN-ODT) disintegrating tablet 4 mg  EVERY 8 HOURS PRN      Acknowledged Seng Borer    08/25/21 1229 08/25/21 1229  ondansetron (ZOFRAN) injection 4 mg  EVERY 6 HOURS PRN      Acknowledged Seng Borer    08/25/21 1229 08/25/21 1229  acetaminophen (TYLENOL) tablet 650 mg  EVERY 6 HOURS PRN      Acknowledged Seng Borer    08/25/21 1229 08/25/21 1229  acetaminophen (TYLENOL) suppository 650 mg  EVERY 6 HOURS PRN      Acknowledged Seng Borer    08/25/21 1229 08/25/21 1229  sodium chloride flush 0.9 % injection 5-40 mL  PRN      Acknowledged Seng Borer    08/25/21 1229 08/25/21 1229  polyethylene glycol (GLYCOLAX) packet 17 g  DAILY PRN      Acknowledged Seng Borer    08/25/21 1229 08/25/21 1229  albuterol sulfate  (90 Base) MCG/ACT inhaler 2 puff  EVERY 4 HOURS PRN     Question:  Initiate RT Bronchodilator Protocol  Answer:   Yes    Acknowledged Seng Borer    08/25/21 1229 08/25/21 1229  lactulose (CHRONULAC) 10 GM/15ML solution 10 g  PRN      Acknowledged Seng Postr    08/25/21 1229 08/25/21 1229  0.9 % sodium chloride infusion  PRN      Acknowledged Seng Postr 08/25/21 1145 08/25/21 1135  dextrose 5 % and 0.45 % sodium chloride infusion  CONTINUOUS      Last MAR action: New Bag - by Alissa Aquiles on 08/25/21 at [address] [facility]    08/25/21 1145 08/25/21 1135  oxyCODONE (OXYCONTIN) extended release tablet 20 mg  2 times per day      Last MAR action: Given - by Dorla Meter on 08/26/21 at [facility]    08/25/21 1145 08/25/21 1136  ondansetron (ZOFRAN-ODT) disintegrating tablet 8 mg  EVERY 8 HOURS      Last MAR action: Not Given - by Dorla Meter on 08/26/21 at [address] [facility]    08/25/21 1134 08/25/21 1135  oxyCODONE (ROXICODONE) immediate release tablet 20 mg  EVERY 4 HOURS PRN      Last MAR action: Given - by Dorla Meter on 08/26/21 at [address] [facility]    08/25/21 1132 08/25/21 1135  HYDROmorphone (DILAUDID) injection 2 mg  EVERY 3 HOURS PRN      Last MAR action: Given - by Dorla Meter on 08/26/21 at [address] [facility]    08/25/21 0645 08/25/21 0638  0.9 % sodium chloride bolus  ONCE      Last MAR action: Stopped - by Naraindira Khano on 08/25/21 at [address] [facility]    08/25/21 0630 08/25/21 0628  HYDROmorphone (DILAUDID) injection 1 mg  ONCE      Last MAR action: Given - by Nara Lingo on 08[facility]/21 [facility]     08/25/21 0630 08/25/21 0628  ketorolac (TORADOL) injection 15 mg  ONCE      Last MAR action: Given - by Nara Lingo on 08/25/21 at [address] [facility]    08/25/21 0600 08/25/21 0555  0.9 % sodium chloride infusion  ONCE      Last MAR action: Stopped - by Nara Lingo on 08/25/21 at [facility], [facility]          Radiology  No results found.       Labs  Results for orders placed or performed during the hospital encounter of 08/25/21   CBC auto differential   Result Value Ref Range    WBC 17.8 (H) 4.0 - 11.0 K/uL    RBC 2.67 (L) 4.20 - 5.90 M/uL    Hemoglobin 9.3 (L) 13.5 - 17.5 g/dL    Hematocrit 27.3 (L) 40.5 - 52.5 %    .1 (H) 80.0 - 100.0 fL    MCH 34.9 (H) 26.0 - 34.0 pg    MCHC 34.2 31.0 - 36.0 g/dL    RDW 22.1 (H) 12.4 - 15.4 %    Platelets 228 300 - 031 K/uL    MPV 8.8 5.0 - 10.5 fL    Neutrophils % 67.8 %    Lymphocytes % 21.5 %    Monocytes % 8.8 %    Eosinophils % 0.9 %    Basophils % 1.0 %    Neutrophils Absolute 12.1 (H) 1.7 - 7.7 K/uL    Lymphocytes Absolute 3.8 1.0 - 5.1 K/uL    Monocytes Absolute 1.6 (H) 0.0 - 1.3 K/uL    Eosinophils Absolute 0.2 0.0 - 0.6 K/uL    Basophils Absolute 0.2 0.0 - 0.2 K/uL   Comprehensive metabolic panel   Result Value Ref Range    Sodium 139 136 - 145 mmol/L    Potassium 4.1 3.5 - 5.1 mmol/L    Chloride 105 99 - 110 mmol/L    CO2 25 21 - 32 mmol/L    Anion Gap 9 3 - 16    Glucose 95 70 - 99 mg/dL    BUN 11 7 - 20 mg/dL    CREATININE 0.7 (L) 0.9 - 1.3 mg/dL    GFR Non-African American >60 >60    GFR African American >60 >60    Calcium 9.6 8.3 - 10.6 mg/dL    Total Protein 8.3 (H) 6.4 - 8.2 g/dL    Albumin 4.6 3.4 - 5.0 g/dL    Albumin/Globulin Ratio 1.2 1.1 - 2.2    Total Bilirubin 3.6 (H) 0.0 - 1.0 mg/dL    Alkaline Phosphatase 174 (H) 40 - 129 U/L    ALT 45 (H) 10 - 40 U/L    AST 37 15 - 37 U/L    Globulin 3.7 g/dL   Reticulocytes   Result Value Ref Range    Retic Ct Pct 9.90 (H) 0.50 - 2.18 %    Retic Ct Abs 0.265 M/uL    Immature Retic Fract 0.61 (H) 0.21 - 0.37   Sample possible blood bank testing   Result Value Ref Range    Specimen Status GIANLUCA    Basic Metabolic Panel w/ Reflex to MG   Result Value Ref Range    Sodium 138 136 - 145 mmol/L    Potassium reflex Magnesium 4.3 3.5 - 5.1 mmol/L    Chloride 103 99 - 110 mmol/L    CO2 24 21 - 32 mmol/L    Anion Gap 11 3 - 16    Glucose 88 70 - 99 mg/dL    BUN 8 7 - 20 mg/dL    CREATININE 0.6 (L) 0.9 - 1.3 mg/dL    GFR Non-African American >60 >60    GFR African American >60 >60    Calcium 9.2 8.3 - 10.6 mg/dL   CBC auto differential   Result Value Ref Range    WBC 14.6 (H) 4.0 - 11.0 K/uL    RBC 2.22 (L) 4.20 - 5.90 M/uL    Hemoglobin 7.9 (L) 13.5 - 17.5 g/dL    Hematocrit 22.7 (L) 40.5 - 52.5 %    .2 (H) 80.0 - 100.0 fL    MCH 35.7 (H) 26.0 - 34.0 pg    MCHC 35.0 31.0 - 36.0 g/dL    RDW 20.3 (H) 12.4 - 15.4 %    Platelets 953 891 - 808 K/uL    MPV 8.4 5.0 - 10.5 fL    Path Consult No     Neutrophils % 57.1 %    Lymphocytes % 28.7 %    Monocytes % 12.0 %    Eosinophils % 1.7 %    Basophils % 0.5 %    Neutrophils Absolute 8.3 (H) 1.7 - 7.7 K/uL    Lymphocytes Absolute 4.2 1.0 - 5.1 K/uL    Monocytes Absolute 1.8 (H) 0.0 - 1.3 K/uL    Eosinophils Absolute 0.2 0.0 - 0.6 K/uL    Basophils Absolute 0.1 0.0 - 0.2 K/uL   Comprehensive metabolic panel   Result Value Ref Range    Potassium 4.3 3.5 - 5.1 mmol/L    Total Protein 6.9 6.4 - 8.2 g/dL    Albumin 4.0 3.4 - 5.0 g/dL    Albumin/Globulin Ratio 1.4 1.1 - 2.2    Total Bilirubin 5.6 (H) 0.0 - 1.0 mg/dL    Alkaline Phosphatase 171 (H) 40 - 129 U/L    ALT 31 10 - 40 U/L    AST 24 15 - 37 U/L    Globulin 2.9 g/dL   Reticulocytes   Result Value Ref Range    Retic Ct Pct 10.09 (H) 0.50 - 2.18 %    Retic Ct Abs 0.224 M/uL    Immature Retic Fract 0.69 (H) 0.21 - 0.37         MDM  Patient seen and evaluated. Relevant records reviewed. 14-year-old male with sickle cell disease who presents with uncontrolled shoulder and back pain consistent with sickle cell crisis. Overall well-appearing on exam he is in no acute distress. Patient reports he is having severe pain and he has been treated with multiple rounds of IV narcotic pain medications as well as Toradol and IV fluids. His labs are without significant derangements. He has a appropriate reticulocyte response. I did order CT scan of his thoracic spine due to his leukocytosis to assess for bone infarction. There is no abnormalities found. Not concern for acute chest or any other complicating factor at this point. However his pain is reportedly uncontrolled.   I discussed the case with his hematologist who does not have any further medical recommendations but reports he will follow the patient should he need to be admitted for pain control. He did inform me that the patient's been instructed to follow-up with pain management as he has reportedly overdosed on narcotic pain medication in the past.  There may be secondary intention however this is difficult to discern during his ED visit today. He needs to be admitted for pain control. Will admit for pain control. Clinical Impression:  1. Sickle cell pain crisis ([address]    2. Leukocytosis, unspecified type          Disposition:  Admit to med/surg floor in stable condition. Blood pressure 105/65, pulse 70, temperature 97.7 °F (36.5 °C), temperature source Oral, resp. rate 16, height 5' 8\" (1.727 m), weight 180 lb (81.6 kg), SpO2 95 %. Patient was given scripts for the following medications. I counseled patient how to take these medications. Discharge Medication List as of 8/26/2021  1:27 PM          Disposition referral (if applicable):  Kathleen Barrios MD  [address]     In 1 week          Total critical care time is 0 minutes, which excludes separately billable procedures and updating family. Time spent is specifically for management of the presenting complaint and symptoms initially, direct bedside care, reevaluation, review of records, and consultation. There was a high probability of clinically significant life-threatening deterioration in the patient's condition, which required my urgent intervention. This chart was generated in part by using Dragon Dictation system and may contain errors related to that system including errors in grammar, punctuation, and spelling, as well as words and phrases that may be inappropriate. If there are any questions or concerns please feel free to contact the dictating provider for clarification.      MD Agnes Lopez MD  08/26/21 [id number]

## 2021-08-25 NOTE — CARE COORDINATION
CASE MANAGEMENT INITIAL ASSESSMENT    Reviewed chart and completed assessment with: Patient     Explained Case Management role/services. Primary contact information: Tamiko Gutierrez, mother     Health Care Decision Maker :   Primary Decision Maker: Sheri Bar - Parent - [phone]    Secondary Decision Maker: Zach Fisher - Parent - [phone]        Can this person be reached and be able to respond quickly, such as within a few minutes or hours? Yes    Admit date/status:08/25 Inpatient   Diagnosis:Sickle cell crisis  Is this a Readmission?:  Yes      Insurance:[employer]    Precert required for SNF: Yes       3 night stay required: No    Living arrangements, Adls, care needs, prior to admission:Home with parents and grandmother, IPTA     Transportation: Parents support      Durable Medical Equipment at home: [address] in the home and/or outpatient, prior to admission: Denies     PT/OT recs: [address] [facility] [address] Exemption Notification (HEN): NA    Barriers to discharge: Denies     Plan/comments: Patient from home, IPTA, denies needs at this time. YANN Nunn     ECOJES on chart for MD signature

## 2021-08-25 NOTE — CONSULTS
Hematology/Oncology Consultation         Patient:   [facility]       Reason for Consult:  Sickle cell anemia with pain crisis   Requesting Physician: No ref. provider found    Chief Complaint:     Chief Complaint   Patient presents with    Sickle Cell Pain Crisis     Pt states he started having pain in upper back and both arms that started yesterday around 2200. History Obtained from:     patient, electronic medical record    History of Present Illness:     [facility] is a 24 y.o. male  with significant past medical history of sickle cell anemia who presents with upper back pain and crisis. Bili is 3.6. WBC 17, Hgb 9.3, and plt are 383. VSS. CT T spine no fracture. Signs of post infectious/inflammatory lung nodules. No fever. He is resting in the ED. NO family present. NO signs of distress. 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)     Sickle cell anemia (HCC)        Past Surgical History:         Procedure Laterality Date    SPLENECTOMY         Current Medications:     Current Facility-Administered Medications   Medication Dose Route Frequency Provider Last Rate Last Admin    HYDROmorphone (DILAUDID) injection 1 mg  1 mg IntraVENous Q3H PRN Deidra Fuentes MD   1 mg at 08/25/21 0940     Current Outpatient Medications   Medication Sig Dispense Refill    oxyCODONE HCl (OXY-IR) 10 MG immediate release tablet Take 10 mg by mouth every 4 hours as needed for Pain.       albuterol sulfate HFA (PROAIR HFA) 108 (90 Base) MCG/ACT inhaler Albuterol HFA Inhaler 90 mcg/actuation  inhaled  2 puffs prn     Active      mometasone-formoterol (DULERA) 100-5 MCG/ACT inhaler Mometasone-Formoterol HFA Inhaler 100 mcg-5 mcg/actuation  inhaled  2 puffs every am     Active      levalbuterol (XOPENEX) 0.31 MG/3ML nebulization Levalbuterol Nebulized    2 puffs prn     Active  lactulose (CHRONULAC) 10 GM/15ML solution Take 15 mLs by mouth as needed       docusate (COLACE, DULCOLAX) 100 MG CAPS Take 100 mg by mouth      ibuprofen (ADVIL;MOTRIN) 800 MG tablet ibuprofen 800 MG Oral Tablet  oral   prn    Active      oxyCODONE (OXYCONTIN) 20 MG extended release tablet TAKE 1 TABLET BY MOUTH EVERY 12 HOURS. TAKE WITH ENOUGH WATER TO SWALLOW WHOLE. DO NOT CRUSH/DISSOLVE/CHEW/CUT/BREAK.  hydroxyurea (HYDREA) 500 MG chemo capsule Take 2 capsules by mouth 2 times daily         Allergies: Allergies   Allergen Reactions    Morphine Shortness Of Breath     Other reaction(s): Histamine-like Reaction  Has asthma exacerbation with morphine-histamine type reaction         Social History:     Social History     Socioeconomic History    Marital status: Single     Spouse name: Not on file    Number of children: 0    Years of education: Not on file    Highest education level: Not on file   Occupational History    Not on file   Tobacco Use    Smoking status: Never Smoker    Smokeless tobacco: Never Used   Vaping Use    Vaping Use: Never used   Substance and Sexual Activity    Alcohol use: Not Currently    Drug use: Never    Sexual activity: Not Currently   Other Topics Concern    Not on file   Social History Narrative    Not on file     Social Determinants of Health     Financial Resource Strain:     Difficulty of Paying Living Expenses:    Food Insecurity:     Worried About Running Out of Food in the Last Year:     Ran Out of Food in the Last Year:    Transportation Needs:     Lack of Transportation (Medical):      Lack of Transportation (Non-Medical):    Physical Activity:     Days of Exercise per Week:     Minutes of Exercise per Session:    Stress:     Feeling of Stress :    Social Connections:     Frequency of Communication with Friends and Family:     Frequency of Social Gatherings with Friends and Family:     Attends Shinto Services:     Active Member of [employer] or Organizations:     Attends Club or Organization Meetings:     Marital Status:    Intimate Partner Violence:     Fear of Current or Ex-Partner:     Emotionally Abused:     Physically Abused:     Sexually Abused:      Social History     Substance and Sexual Activity   Drug Use Never     Social History     Substance and Sexual Activity   Alcohol Use Not Currently     Social History     Substance and Sexual Activity   Sexual Activity Not Currently     Social History     Tobacco Use   Smoking Status Never Smoker   Smokeless Tobacco Never Used       Family History:     History reviewed. No pertinent family history. Review of Systems:     Constitutional: Denies fever, sweats, weight loss. .     Eyes: No visual changes or diplopia. No scleral icterus. ENT: No Headaches, no hearing loss, no  vertigo. No mouth sores or sore throat. Cardiovascular: No chest pain, no dyspnea on exertion, no palpitations, no  loss of consciousness. Respiratory: No cough, no  wheezing, no dyspnea, no sputum production. No hemoptysis. .    Gastrointestinal: No abdominal pain, no appetite loss, no blood in stools. No change in bowel habits. Genitourinary: No dysuria, trouble voiding, or hematuria. Musculoskeletal:  Generalized weakness. No joint complaints. Integumentary: No rash or pruritis. Neurological: No headache, diplopia. No change in gait, balance, or coordination. No paresthesias. Endocrine: No temperature intolerance. No excessive thirst, fluid intake, or urination. Hematologic/Lymphatic: No abnormal bruising or ecchymoses, no blood clots or swollen lymph nodes. Allergic/Immunologic: No nasal congestion or hives. Physical Exam:     BP (!) 128/93   Pulse 59   Temp 98.3 °F (36.8 °C) (Oral)   Resp 18   Ht 5' 8\" (1.727 m)   Wt 180 lb (81.6 kg)   SpO2 100%   BMI 27.37 kg/m²     CONSTITUTIONAL: awake, alert, cooperative, no apparent distress.   EYES:  Pupils equal, round and reactive to light, sclera non-icteric, conjunctiva normal  ENT:  Normocephalic, without obvious abnormality, atraumatic, sinuses nontender on palpation, external ears without lesions, oral pharynx with moist mucus membranes, no mucositis. NECK:  Supple, symmetrical, trachea midline, no adenopathy, thyroid symmetric, not enlarged and no tenderness, skin normal  HEMATOLOGIC/LYMPHATICS:  no cervical lymphadenopathy, no supraclavicular lymphadenopathy, no axillary lymphadenopathy and no inguinal lymphadenopathy  BACK:  Symmetric, no curvature, spinous processes are non-tender on palpation, paraspinous muscles are non-tender on palpation, no costal vertebral tenderness  LUNGS:  Clear to auscultation bilaterally, no crackles or wheezing  CARDIOVASCULAR:  Regular rate and rhythm, normal S1 and S2, no S3 or S4, and no murmur noted  ABDOMEN:  Normal bowel sounds, soft, non-distended, non-tender, no masses palpated, no hepatosplenomegally  MUSCULOSKELETAL:  There is no redness, warmth, or swelling of the joints  NEUROLOGIC:   No focal findings. SKIN:  Scattered bruising and ecchymoses. EXT: without clubbing, cyanosis or edema. Data:     CBC:  Recent Labs     08/25/21  0549 08/09/21  0747 08/08/21  0521   WBC 17.8* 9.5 14.2*   HGB 9.3* 7.2* 8.1*   HCT 27.3* 21.0* 24.1*   .1* 98.3 99.7    1,102* 1,257*       BMP:  Recent Labs     08/25/21  0549 08/09/21  0747 08/08/21  0521    140 141   K 4.1 4.0 3.9   CO2 25 25 26   BUN 11 8 10   CREATININE 0.7* 0.7* 0.7*       HEPATIC:  Recent Labs     08/25/21  0549 08/08/21  0521 07/27/21  1008   AST 37 20 36   ALT 45* 23 26   ALKPHOS 174* 132* 197*   PROT 8.3* 7.3 7.2   BILITOT 3.6* 1.7* 5.2*   BILIDIR  --   --  0.8*       TUMOR MARKERS:  No results for input(s): PSA, CEA, , DM0676,  in the last 720 hours.       MAGNESIUM:    Lab Results   Component Value Date    MG 1.80 05/05/2021       PT/INR:    No results found for: [email or web address] [facility]    Lab Results   Component Value Date    INR 1.54 06/21/2021       PTT:    No results found for: APTT    U/A:    Lab Results   Component Value Date    COLORU Yellow 06/09/2021    PHUR 7.0 07/25/2021    WBCUA None seen 06/09/2021    RBCUA 0-2 06/09/2021    MUCUS 2+ 12/01/2020    BACTERIA Rare 06/09/2021    CLARITYU Clear 06/09/2021    SPECGRAV 1.010 06/09/2021    LEUKOCYTESUR Negative 06/09/2021    UROBILINOGEN 0.2 06/09/2021    BILIRUBINUR Negative 06/09/2021    BLOODU TRACE-INTACT 06/09/2021    GLUCOSEU Negative 06/09/2021    AMORPHOUS Rare 01/05/2021       Imaging:     Impression   No acute bony abnormality.  A subtle area of sclerosis in the T3 vertebral   body is unchanged compared April 2020.  This may simply represent a bone   island. No acute fracture noted.  No significant degenerative change       Small ground-glass nodule is seen in the right lower lobe measuring 5 mm,   likely postinflammatory-infectious         Impression:     Sickle cell Crisis   Plan:     IV fluids and Toradol   Hydrea and Folic acid   CBC/CMP/Retic   IVP Dilaudid 2 mg every 3 hours PRN   PRN Oxy IR 20 mg and LA Oxy 20 BID  Hopefully dc in 1-2 days                  Thank you very much for allowing me to participate in the care of this patient.     Melissa Marinelli CNP   Medical Oncology/Hematology    [facility] - [address],  [place] [address]  Phone: [phone]  Fax: [phone]

## 2021-08-25 NOTE — ACP (ADVANCE CARE PLANNING)
Advance Care Planning     General Advance Care Planning (ACP) Conversation    Date of Conversation: 8/25/2021  Conducted with: Patient with Decision Making Capacity    Healthcare Decision Maker:    Primary Decision Maker: Dolores Johnson - Parent - [phone]    Secondary Decision Maker: Cris Guillen - Parent - [phone]  Click here to complete Healthcare Decision Makers including selection of the Healthcare Decision Maker Relationship (ie \"Primary\").        YANN Hale

## 2021-08-26 VITALS
HEART RATE: 70 BPM | BODY MASS INDEX: 27.28 KG/M2 | RESPIRATION RATE: 16 BRPM | OXYGEN SATURATION: 95 % | SYSTOLIC BLOOD PRESSURE: 105 MMHG | HEIGHT: 68 IN | WEIGHT: 180 LBS | DIASTOLIC BLOOD PRESSURE: 65 MMHG | TEMPERATURE: 97.7 F

## 2021-08-26 LAB
A/G RATIO: 1.4 (ref 1.1–2.2)
ALBUMIN SERPL-MCNC: 4 G/DL (ref 3.4–5)
ALP BLD-CCNC: 171 U/L (ref 40–129)
ALT SERPL-CCNC: 31 U/L (ref 10–40)
ANION GAP SERPL CALCULATED.3IONS-SCNC: 11 MMOL/L (ref 3–16)
AST SERPL-CCNC: 24 U/L (ref 15–37)
BASOPHILS ABSOLUTE: 0.1 K/UL (ref 0–0.2)
BASOPHILS RELATIVE PERCENT: 0.5 %
BILIRUB SERPL-MCNC: 5.6 MG/DL (ref 0–1)
BUN BLDV-MCNC: 8 MG/DL (ref 7–20)
CALCIUM SERPL-MCNC: 9.2 MG/DL (ref 8.3–10.6)
CHLORIDE BLD-SCNC: 103 MMOL/L (ref 99–110)
CO2: 24 MMOL/L (ref 21–32)
CREAT SERPL-MCNC: 0.6 MG/DL (ref 0.9–1.3)
EOSINOPHILS ABSOLUTE: 0.2 K/UL (ref 0–0.6)
EOSINOPHILS RELATIVE PERCENT: 1.7 %
GFR AFRICAN AMERICAN: >60
GFR NON-AFRICAN AMERICAN: >60
GLOBULIN: 2.9 G/DL
GLUCOSE BLD-MCNC: 88 MG/DL (ref 70–99)
HCT VFR BLD CALC: 22.7 % (ref 40.5–52.5)
HEMATOLOGY PATH CONSULT: NO
HEMOGLOBIN: 7.9 G/DL (ref 13.5–17.5)
IMMATURE RETIC FRACT: 0.69 (ref 0.21–0.37)
LYMPHOCYTES ABSOLUTE: 4.2 K/UL (ref 1–5.1)
LYMPHOCYTES RELATIVE PERCENT: 28.7 %
MCH RBC QN AUTO: 35.7 PG (ref 26–34)
MCHC RBC AUTO-ENTMCNC: 35 G/DL (ref 31–36)
MCV RBC AUTO: 102.2 FL (ref 80–100)
MONOCYTES ABSOLUTE: 1.8 K/UL (ref 0–1.3)
MONOCYTES RELATIVE PERCENT: 12 %
NEUTROPHILS ABSOLUTE: 8.3 K/UL (ref 1.7–7.7)
NEUTROPHILS RELATIVE PERCENT: 57.1 %
PDW BLD-RTO: 20.3 % (ref 12.4–15.4)
PLATELET # BLD: 228 K/UL (ref 135–450)
PMV BLD AUTO: 8.4 FL (ref 5–10.5)
POTASSIUM REFLEX MAGNESIUM: 4.3 MMOL/L (ref 3.5–5.1)
POTASSIUM SERPL-SCNC: 4.3 MMOL/L (ref 3.5–5.1)
RBC # BLD: 2.22 M/UL (ref 4.2–5.9)
RETICULOCYTE ABSOLUTE COUNT: 0.22 M/UL
RETICULOCYTE COUNT PCT: 10.09 % (ref 0.5–2.18)
SODIUM BLD-SCNC: 138 MMOL/L (ref 136–145)
TOTAL PROTEIN: 6.9 G/DL (ref 6.4–8.2)
WBC # BLD: 14.6 K/UL (ref 4–11)

## 2021-08-26 PROCEDURE — 2580000003 HC RX 258: Performed by: INTERNAL MEDICINE

## 2021-08-26 PROCEDURE — 94640 AIRWAY INHALATION TREATMENT: CPT

## 2021-08-26 PROCEDURE — G0378 HOSPITAL OBSERVATION PER HR: HCPCS

## 2021-08-26 PROCEDURE — 6370000000 HC RX 637 (ALT 250 FOR IP): Performed by: NURSE PRACTITIONER

## 2021-08-26 PROCEDURE — 80053 COMPREHEN METABOLIC PANEL: CPT

## 2021-08-26 PROCEDURE — 85045 AUTOMATED RETICULOCYTE COUNT: CPT

## 2021-08-26 PROCEDURE — 6360000002 HC RX W HCPCS: Performed by: NURSE PRACTITIONER

## 2021-08-26 PROCEDURE — 85025 COMPLETE CBC W/AUTO DIFF WBC: CPT

## 2021-08-26 PROCEDURE — 6360000002 HC RX W HCPCS: Performed by: INTERNAL MEDICINE

## 2021-08-26 PROCEDURE — 36415 COLL VENOUS BLD VENIPUNCTURE: CPT

## 2021-08-26 PROCEDURE — 96376 TX/PRO/DX INJ SAME DRUG ADON: CPT

## 2021-08-26 RX ADMIN — HYDROMORPHONE HYDROCHLORIDE 2 MG: 2 INJECTION, SOLUTION INTRAMUSCULAR; INTRAVENOUS; SUBCUTANEOUS at 11:19

## 2021-08-26 RX ADMIN — Medication 2 PUFF: at 08:06

## 2021-08-26 RX ADMIN — HYDROMORPHONE HYDROCHLORIDE 2 MG: 2 INJECTION, SOLUTION INTRAMUSCULAR; INTRAVENOUS; SUBCUTANEOUS at 02:05

## 2021-08-26 RX ADMIN — OXYCODONE HYDROCHLORIDE 20 MG: 20 TABLET, FILM COATED, EXTENDED RELEASE ORAL at 10:51

## 2021-08-26 RX ADMIN — OXYCODONE 20 MG: 5 TABLET ORAL at 04:06

## 2021-08-26 RX ADMIN — HYDROMORPHONE HYDROCHLORIDE 2 MG: 2 INJECTION, SOLUTION INTRAMUSCULAR; INTRAVENOUS; SUBCUTANEOUS at 05:19

## 2021-08-26 RX ADMIN — OXYCODONE 20 MG: 5 TABLET ORAL at 12:59

## 2021-08-26 RX ADMIN — HYDROMORPHONE HYDROCHLORIDE 2 MG: 2 INJECTION, SOLUTION INTRAMUSCULAR; INTRAVENOUS; SUBCUTANEOUS at 08:22

## 2021-08-26 RX ADMIN — OXYCODONE HYDROCHLORIDE 20 MG: 20 TABLET, FILM COATED, EXTENDED RELEASE ORAL at 01:11

## 2021-08-26 RX ADMIN — SODIUM CHLORIDE, PRESERVATIVE FREE 10 ML: 5 INJECTION INTRAVENOUS at 08:23

## 2021-08-26 RX ADMIN — KETOROLAC TROMETHAMINE 30 MG: 30 INJECTION, SOLUTION INTRAMUSCULAR; INTRAVENOUS at 01:11

## 2021-08-26 RX ADMIN — FOLIC ACID 2 MG: 1 TABLET ORAL at 08:22

## 2021-08-26 RX ADMIN — HYDROXYUREA 1000 MG: 500 CAPSULE ORAL at 08:22

## 2021-08-26 RX ADMIN — KETOROLAC TROMETHAMINE 30 MG: 30 INJECTION, SOLUTION INTRAMUSCULAR; INTRAVENOUS at 13:06

## 2021-08-26 ASSESSMENT — PAIN - FUNCTIONAL ASSESSMENT: PAIN_FUNCTIONAL_ASSESSMENT: ACTIVITIES ARE NOT PREVENTED

## 2021-08-26 ASSESSMENT — PAIN SCALES - GENERAL
PAINLEVEL_OUTOF10: 7
PAINLEVEL_OUTOF10: 7
PAINLEVEL_OUTOF10: 6

## 2021-08-26 ASSESSMENT — PAIN DESCRIPTION - PAIN TYPE: TYPE: ACUTE PAIN

## 2021-08-26 ASSESSMENT — PAIN DESCRIPTION - LOCATION: LOCATION: BACK;SHOULDER

## 2021-08-26 NOTE — PLAN OF CARE
Problem: Pain:  Description: Pain management should include both nonpharmacologic and pharmacologic interventions. Goal: Pain level will decrease  Description: Pain level will decrease  Outcome: Ongoing     Problem: Pain:  Description: Pain management should include both nonpharmacologic and pharmacologic interventions.   Goal: Control of acute pain  Description: Control of acute pain  Outcome: Ongoing     Problem: Falls - Risk of:  Goal: Will remain free from falls  Description: Will remain free from falls  8/26/2021 0047 by Wing Henderson RN  Outcome: Ongoing

## 2021-08-26 NOTE — DISCHARGE SUMMARY
Hospital Discharge Summary     NAME: Yvonne Veras    :  2000    MRN:  [id number]    Admission Details:     Admit date:  2021    Admitting Physician:  Rashi Charles MD  Primary Care Physician:  Marcela Elkins MD       Discharge Details:     Discharge date:   2021   Discharge Condition: good   Discharge Diagnoses:    Patient Active Problem List   Diagnosis    [facility] crisis [facility]    Asthma    Sickle cell crisis ([facility]    Sepsis ([facility]    Opiate overdose ([facility]    Opiate antagonist poisoning, accidental or unintentional, initial encounter    Leukocytosis    Hypoxia    Anemia    Other chest pain    Pneumonia due to infectious organism    Fever    Chronic prescription opiate use       Hospital Course:       Given course of IV fluids, Toradol, and IV Dilaudid. Discharge Medications:       [facility] Medication Instructions FYB:[id number]    Printed on:21 125   Medication Information                      albuterol sulfate HFA (PROAIR HFA) 108 (90 Base) MCG/ACT inhaler  Albuterol HFA Inhaler 90 mcg/actuation  inhaled  2 puffs prn     Active             docusate (COLACE, DULCOLAX) 100 MG CAPS  Take 100 mg by mouth             hydroxyurea (HYDREA) 500 MG chemo capsule  Take 2 capsules by mouth 2 times daily             ibuprofen (ADVIL;MOTRIN) 800 MG tablet  ibuprofen 800 MG Oral Tablet  oral   prn    Active             lactulose (CHRONULAC) 10 GM/15ML solution  Take 15 mLs by mouth as needed              levalbuterol (XOPENEX) 0.31 MG/3ML nebulization  Levalbuterol Nebulized    2 puffs prn     Active             mometasone-formoterol (DULERA) 100-5 MCG/ACT inhaler  Mometasone-Formoterol HFA Inhaler 100 mcg-5 mcg/actuation  inhaled  2 puffs every am     Active             oxyCODONE (OXYCONTIN) 20 MG extended release tablet  TAKE 1 TABLET BY MOUTH EVERY 12 HOURS. TAKE WITH ENOUGH WATER TO SWALLOW WHOLE. DO NOT CRUSH/DISSOLVE/CHEW/CUT/BREAK. oxyCODONE HCl (OXY-IR) 10 MG immediate release tablet  Take 10 mg by mouth every 4 hours as needed for Pain. Consults:  hospitalist     Significant Diagnostic Studies/Imaging:     XR CHEST (2 VW)    Result Date: 8/25/2021  EXAMINATION: TWO XRAY VIEWS OF THE CHEST 8/25/2021 6:36 am COMPARISON: 08/08/2021 and prior HISTORY: ORDERING SYSTEM PROVIDED HISTORY: sickle cell crisis TECHNOLOGIST PROVIDED HISTORY: Reason for exam:->sickle cell crisis Reason for Exam: Chest pain; sickle cell crisis Acuity: Acute Type of Exam: Initial FINDINGS: Borderline cardiomegaly is noted. Pulmonary vascularity appears within normal limits. The lungs are clear. No pleural effusion is noted. Osseous structures demonstrate no acute abnormality     No acute process     XR CHEST (2 VW)    Result Date: 8/8/2021  EXAMINATION: TWO XRAY VIEWS OF THE CHEST 8/8/2021 4:27 am COMPARISON: 07/24/2021 radiograph HISTORY: ORDERING SYSTEM PROVIDED HISTORY: sickle cell crisis pain over ribs, back TECHNOLOGIST PROVIDED HISTORY: Reason for exam:->sickle cell crisis pain over ribs, back Reason for Exam: sickle cell pain crisis in ribs and back Acuity: Acute Type of Exam: Initial FINDINGS: The heart is enlarged. Mediastinum is normal.  Pulmonary vascular markings are normal.  The lungs are clear. There are no significant skeletal findings. Cardiomegaly with no acute finding in the chest.     XR CLAVICLE RIGHT    Result Date: 7/29/2021  EXAMINATION: TWO XRAY VIEWS OF THE RIGHT CLAVICLE 7/29/2021 12:17 pm COMPARISON: None. HISTORY: ORDERING SYSTEM PROVIDED HISTORY: sickle cell pain, eval infarct TECHNOLOGIST PROVIDED HISTORY: Reason for exam:->sickle cell pain, eval infarct Reason for Exam: sickle cell pain Acuity: Chronic Type of Exam: Ongoing FINDINGS: There is no acute fracture or dislocation. AC joint is unremarkable without separation. The glenohumeral joint is unremarkable. Surrounding soft tissues are unremarkable. Included adjacent lung and upper right ribs are unremarkable. No acute clavicle abnormality. CT THORACIC SPINE WO CONTRAST    Result Date: 8/25/2021  EXAMINATION: CT OF THE THORACIC SPINE WITHOUT CONTRAST  8/25/2021 7:53 am: TECHNIQUE: CT of the thoracic spine was performed without the administration of intravenous contrast. Multiplanar reformatted images are provided for review. Dose modulation, iterative reconstruction, and/or weight based adjustment of the mA/kV was utilized to reduce the radiation dose to as low as reasonably achievable. COMPARISON: April 2020 HISTORY: ORDERING SYSTEM PROVIDED HISTORY: sickle cell pain, leukocytotis, eval for bony infarct TECHNOLOGIST PROVIDED HISTORY: Reason for exam:->sickle cell pain, leukocytotis, eval for bony infarct Reason for Exam: sickle cell pain in upper back since 10 pm Acuity: Acute Type of Exam: Initial FINDINGS: BONES/ALIGNMENT: There is normal alignment of the spine. The vertebral body heights are maintained. No osseous destructive lesion is seen. There is a subtle area of sclerosis seen in the T3 vertebral body. This is unchanged. DEGENERATIVE CHANGES: No gross spinal canal stenosis or bony neural foraminal narrowing of the thoracic spine. SOFT TISSUES: Limited images of the thyroid gland appear normal.  Small ground-glass nodule is seen in the right lower lobe measuring 5 mm, likely postinflammatory-infectious Postsurgical changes seen in the left upper. Unremarkable CT of the thoracic spine. No acute bony abnormality. A subtle area of sclerosis in the T3 vertebral body is unchanged compared April 2020. This may simply represent a bone island. No acute fracture noted.   No significant degenerative change Small ground-glass nodule is seen in the right lower lobe measuring 5 mm, likely postinflammatory-infectious       Treatments:     Fluids and IV narcotics , IV NSAIDS     Disposition:       Omar Milton should be follow up with Duglas Newman Sunil Bernal MD in one week.        Signed:     8/26/2021  12:52 PM    Malik Meade CNP   Medical Oncology/Hematology    [facility] - [address],  [place] [address]  Phone: [phone]  Fax: [phone]

## 2021-08-26 NOTE — PROGRESS NOTES
Pt provided with discharge instructions. All questions answered. Medications picked up from inpatient pharmacy on way out. Pt stated he had a black wallet but could not find it in room. Possibly wallet was lost in ED bed while transferring pt to floor. This RN called security and ED but no wallet was turned to lost and found. If wallet is turned in if found RN will immediately notify patient- contact info is in chart.

## 2021-08-26 NOTE — PROGRESS NOTES
ONCOLOGY HEMATOLOGY CARE PROGRESS NOTE      SUBJECTIVE:     Wants to go home today. Feels well. ROS:     Constitutional:  No weight loss, No fever, No chills, No night sweats. Energy level good. Eyes:  No impairment or change in vision  ENT / Mouth:  No pain, abnormal ulceration, bleeding, nasal drip or change in voice or hearing  Cardiovascular:  No chest pain, palpitations, new edema, or calf discomfort  Respiratory:  No pain, hemoptysis, change to breathing  Breast:  No pain, discharge, change in appearance or texture  Gastrointestinal:  No pain, cramping, jaundice, change to eating and bowel habits  Urinary:  No pain, bleeding or change in continence  Genitalia: No pain, bleeding or discharge  Musculoskeletal:  No redness, pain, edema or weakness  Skin:  No pruritus, rash, change to nodules or lesions  Neurologic:  No discomfort, change in mental status, speech, sensory or motor activity  Psychiatric:  No change in concentration or change to affect or mood  Endocrine:  No hot flashes, increased thirst, or change to urine production  Hematologic: No petechiae, ecchymosis or bleeding  Lymphatic:  No lymphadenopathy or lymphedema  Allergy / Immunologic:  No eczema, hives, frequent or recurrent infections    OBJECTIVE        Physical    VITALS:  /65   Pulse 70   Temp 97.7 °F (36.5 °C) (Oral)   Resp 16   Ht 5' 8\" (1.727 m)   Wt 180 lb (81.6 kg)   SpO2 95%   BMI 27.37 kg/m²   TEMPERATURE:  Current - Temp: 97.7 °F (36.5 °C);  Max - Temp  Av.8 °F (36.6 °C)  Min: 97.2 °F (36.2 °C)  Max: 98.1 °F (36.7 °C)  PULSE OXIMETRY RANGE: SpO2  Av.7 %  Min: 94 %  Max: 98 %  24HR INTAKE/OUTPUT:    Intake/Output Summary (Last 24 hours) at 2021 1157  Last data filed at 2021 0600  Gross per 24 hour   Intake --   Output 1450 ml   Net -1450 ml       CONSTITUTIONAL: awake, alert, cooperative, no apparent distress   EYES: pupils equal, round and reactive to light, sclera clear and conjunctiva normal  ENT: Normocephalic, without obvious abnormality, atraumatic  NECK: supple, symmetrical, no jugular venous distension and no carotid bruits   HEMATOLOGIC/LYMPHATIC: no cervical, supraclavicular or axillary lymphadenopathy   LUNGS: no increased work of breathing and clear to auscultation   CARDIOVASCULAR: regular rate and rhythm, normal S1 and S2, no murmur noted  ABDOMEN: normal bowel sounds x 4, soft, non-distended, non-tender, no masses palpated, no hepatosplenomgaly   MUSCULOSKELETAL: full range of motion noted, tone is normal  NEUROLOGIC: awake, alert, oriented to name, place and time. Motor skills grossly intact. SKIN: Normal skin color, texture, turgor and no jaundice. appears intact   EXTREMITIES: no LE edema       Data      Recent Labs     08/25/21  0549 08/26/21  0537   WBC 17.8* 14.6*   HGB 9.3* 7.9*   HCT 27.3* 22.7*    228   .1* 102.2*        Recent Labs     08/25/21  0549 08/26/21  0537    138   K 4.1 4.3  4.3    103   CO2 25 24   BUN 11 8   CREATININE 0.7* 0.6*     Recent Labs     08/25/21  0549 08/26/21  0537   AST 37 24   ALT 45* 31   BILITOT 3.6* 5.6*   ALKPHOS 174* 171*       Magnesium:    Lab Results   Component Value Date    MG 1.80 05/05/2021    MG 1.80 05/04/2021    MG 1.90 01/12/2021         Problem List  Patient Active Problem List   Diagnosis    [facility] crisis ([facility]    Asthma    Sickle cell crisis ([facility]    Sepsis ([facility]    Opiate overdose ([facility]    Opiate antagonist poisoning, accidental or unintentional, initial encounter    Leukocytosis    Hypoxia    Anemia    Other chest pain    Pneumonia due to infectious organism    Fever    Chronic prescription opiate use       ASSESSMENT AND PLAN:    Okay to dc today   Can get Oxy refilled today- message sent to Dr. Di Mendoza   CBC stable   Cont PO fluids at home. Hydrea and Folate on dc.            ONCOLOGIC DISPOSITION: home today       Melissa Marinelli, LYNNETTE Please contact through [address]

## 2021-08-26 NOTE — CARE COORDINATION
CASE MANAGEMENT DISCHARGE SUMMARY    Discharge to: Home    Transportation: Via private car     Confirmed discharge plan with: Patient    RN, name: Kieran Rowland RN    Note: Discharging nurse to complete DANIS, reconcile AVS, and place final copy with patient's discharge packet. RN to ensure that written prescriptions for  Level II medications are sent with patient to the facility as per protocol.   YANN Hernandez

## 2021-08-26 NOTE — PROGRESS NOTES
Pt A&Ox4. VSS. Shift assessment completed. Pt states he feels better today and would like to go home. NP notified. Call light within reach, bed in lowest position, wheels locked.

## 2021-08-26 NOTE — PROGRESS NOTES
Hospitalist Progress Note    Date of Admission: 8/25/2021    Chief Complaint: Chest pain    Hospital Course:   24 y.o. male who presented to [facility] with chest pain. PMHx significant for sickle cell disease and frequent exacerbations. He was recently hospitalized here. He presented with moderate to severe back pain and diffuse joint pains. He denies any shortness of breath. Chest x-ray was clear. CT spine demonstrated no acute bony abnormalities but showed a small groundglass nodule in the right lower lobe. He was afebrile. Subjective: His pain symptoms have improved and he wishes to discharge home today. No shortness of breath. Labs:   Recent Labs     08/25/21  0549 08/26/21  0537   WBC 17.8* 14.6*   HGB 9.3* 7.9*   HCT 27.3* 22.7*    228     Recent Labs     08/25/21  0549 08/26/21  0537    138   K 4.1 4.3  4.3    103   CO2 25 24   BUN 11 8   CREATININE 0.7* 0.6*   CALCIUM 9.6 9.2     Recent Labs     08/25/21  0549 08/26/21  0537   AST 37 24   ALT 45* 31   BILITOT 3.6* 5.6*   ALKPHOS 174* 171*     No results for input(s): INR in the last 72 hours. Physical Exam Performed:    /65   Pulse 70   Temp 97.7 °F (36.5 °C) (Oral)   Resp 16   Ht 5' 8\" (1.727 m)   Wt 180 lb (81.6 kg)   SpO2 95%   BMI 27.37 kg/m²     General appearance: Uncomfortable, appears stated age and cooperative. HEENT:  Normal cephalic, atraumatic without obvious deformity. Pupils equal, round, and reactive to light. Extra ocular muscles intact. Conjunctivae/corneas clear. Neck: Supple, no jugular venous distention. Trachea midline with full range of motion. Respiratory:  Normal respiratory effort. Clear to auscultation, bilaterally without Rales/Wheezes/Rhonchi. Cardiovascular: Regular rate and rhythm with normal S1/S2 without murmurs, rubs or gallops. Abdomen: Soft, non-tender, non-distended with normal bowel sounds. Musculoskelatal: No clubbing, cyanosis or edema bilaterally. Full range of motion without deformity. Neurologic:  Neurovascularly intact without any focal sensory/motor deficits. Cranial nerves: II-XII intact, grossly non-focal.  Psychiatric: Alert and oriented, thought content appropriate, normal insight  Skin: Skin color, texture, turgor normal.  No rashes or lesions. Capillary Refill: Brisk,< 3 seconds   Peripheral Pulses: +2 palpable, equal bilaterally     Assessment/Plan:    Active Hospital Problems    Diagnosis     Sickle cell crisis (HCC) [D57.00]      Sickle cell crisis: With frequent exacerbations. Treated with IV fluid hydration, hydroxyurea,  acute on chronic pain management. Symptoms have improved. Discussed with hematology he is stable for discharge today.     Dispo -okay for discharge per hematology service    Jt Merida MD

## 2021-08-30 ENCOUNTER — HOSPITAL ENCOUNTER (OUTPATIENT)
Age: 21
Setting detail: OBSERVATION
Discharge: HOME | End: 2021-09-02
Attending: EMERGENCY MEDICINE | Admitting: INTERNAL MEDICINE
Payer: COMMERCIAL

## 2021-08-30 DIAGNOSIS — D57.00 SICKLE CELL PAIN CRISIS (HCC): Primary | ICD-10-CM

## 2021-08-30 DIAGNOSIS — R52 INTRACTABLE PAIN: ICD-10-CM

## 2021-08-30 LAB
A/G RATIO: 1.4 (ref 1.1–2.2)
ALBUMIN SERPL-MCNC: 5 G/DL (ref 3.4–5)
ALP BLD-CCNC: 190 U/L (ref 40–129)
ALT SERPL-CCNC: 36 U/L (ref 10–40)
ANION GAP SERPL CALCULATED.3IONS-SCNC: 10 MMOL/L (ref 3–16)
ANISOCYTOSIS: ABNORMAL
AST SERPL-CCNC: 38 U/L (ref 15–37)
BASOPHILIC STIPPLING: ABNORMAL
BASOPHILS ABSOLUTE: 0 K/UL (ref 0–0.2)
BASOPHILS RELATIVE PERCENT: 0 %
BILIRUB SERPL-MCNC: 4.5 MG/DL (ref 0–1)
BUN BLDV-MCNC: 11 MG/DL (ref 7–20)
CALCIUM SERPL-MCNC: 10 MG/DL (ref 8.3–10.6)
CHLORIDE BLD-SCNC: 107 MMOL/L (ref 99–110)
CO2: 24 MMOL/L (ref 21–32)
CREAT SERPL-MCNC: 0.8 MG/DL (ref 0.9–1.3)
EOSINOPHILS ABSOLUTE: 0 K/UL (ref 0–0.6)
EOSINOPHILS RELATIVE PERCENT: 0 %
GFR AFRICAN AMERICAN: >60
GFR NON-AFRICAN AMERICAN: >60
GLOBULIN: 3.5 G/DL
GLUCOSE BLD-MCNC: 101 MG/DL (ref 70–99)
HCT VFR BLD CALC: 28.6 % (ref 40.5–52.5)
HEMATOLOGY PATH CONSULT: NO
HEMOGLOBIN: 9.7 G/DL (ref 13.5–17.5)
IMMATURE RETIC FRACT: 0.62 (ref 0.21–0.37)
LYMPHOCYTES ABSOLUTE: 2.7 K/UL (ref 1–5.1)
LYMPHOCYTES RELATIVE PERCENT: 14 %
MACROCYTES: ABNORMAL
MCH RBC QN AUTO: 35.4 PG (ref 26–34)
MCHC RBC AUTO-ENTMCNC: 34 G/DL (ref 31–36)
MCV RBC AUTO: 104 FL (ref 80–100)
MICROCYTES: ABNORMAL
MONOCYTES ABSOLUTE: 1.2 K/UL (ref 0–1.3)
MONOCYTES RELATIVE PERCENT: 6 %
NEUTROPHILS ABSOLUTE: 15.4 K/UL (ref 1.7–7.7)
NEUTROPHILS RELATIVE PERCENT: 80 %
OVALOCYTES: ABNORMAL
PDW BLD-RTO: 25.1 % (ref 12.4–15.4)
PLATELET # BLD: 290 K/UL (ref 135–450)
PLATELET SLIDE REVIEW: ADEQUATE
PMV BLD AUTO: 8.5 FL (ref 5–10.5)
POIKILOCYTES: ABNORMAL
POLYCHROMASIA: ABNORMAL
POTASSIUM SERPL-SCNC: 4.3 MMOL/L (ref 3.5–5.1)
RBC # BLD: 2.75 M/UL (ref 4.2–5.9)
RETICULOCYTE ABSOLUTE COUNT: 0.39 M/UL
RETICULOCYTE COUNT PCT: 14.14 % (ref 0.5–2.18)
SICKLE CELLS: ABNORMAL
SLIDE REVIEW: ABNORMAL
SODIUM BLD-SCNC: 141 MMOL/L (ref 136–145)
TARGET CELLS: ABNORMAL
TOTAL PROTEIN: 8.5 G/DL (ref 6.4–8.2)
WBC # BLD: 19.2 K/UL (ref 4–11)

## 2021-08-30 PROCEDURE — 96376 TX/PRO/DX INJ SAME DRUG ADON: CPT

## 2021-08-30 PROCEDURE — 85025 COMPLETE CBC W/AUTO DIFF WBC: CPT

## 2021-08-30 PROCEDURE — 85660 RBC SICKLE CELL TEST: CPT

## 2021-08-30 PROCEDURE — 86901 BLOOD TYPING SEROLOGIC RH(D): CPT

## 2021-08-30 PROCEDURE — 6360000002 HC RX W HCPCS: Performed by: EMERGENCY MEDICINE

## 2021-08-30 PROCEDURE — 86900 BLOOD TYPING SEROLOGIC ABO: CPT

## 2021-08-30 PROCEDURE — 86902 BLOOD TYPE ANTIGEN DONOR EA: CPT

## 2021-08-30 PROCEDURE — 85045 AUTOMATED RETICULOCYTE COUNT: CPT

## 2021-08-30 PROCEDURE — 96375 TX/PRO/DX INJ NEW DRUG ADDON: CPT

## 2021-08-30 PROCEDURE — 2580000003 HC RX 258: Performed by: EMERGENCY MEDICINE

## 2021-08-30 PROCEDURE — 86923 COMPATIBILITY TEST ELECTRIC: CPT

## 2021-08-30 PROCEDURE — 99283 EMERGENCY DEPT VISIT LOW MDM: CPT

## 2021-08-30 PROCEDURE — 86850 RBC ANTIBODY SCREEN: CPT

## 2021-08-30 PROCEDURE — 96361 HYDRATE IV INFUSION ADD-ON: CPT

## 2021-08-30 PROCEDURE — P9016 RBC LEUKOCYTES REDUCED: HCPCS

## 2021-08-30 PROCEDURE — 96374 THER/PROPH/DIAG INJ IV PUSH: CPT

## 2021-08-30 PROCEDURE — 80053 COMPREHEN METABOLIC PANEL: CPT

## 2021-08-30 RX ORDER — 0.9 % SODIUM CHLORIDE 0.9 %
1000 INTRAVENOUS SOLUTION INTRAVENOUS ONCE
Status: COMPLETED | OUTPATIENT
Start: 2021-08-30 | End: 2021-08-30

## 2021-08-30 RX ORDER — KETOROLAC TROMETHAMINE 30 MG/ML
30 INJECTION, SOLUTION INTRAMUSCULAR; INTRAVENOUS ONCE
Status: COMPLETED | OUTPATIENT
Start: 2021-08-30 | End: 2021-08-30

## 2021-08-30 RX ORDER — ONDANSETRON 2 MG/ML
4 INJECTION INTRAMUSCULAR; INTRAVENOUS ONCE
Status: COMPLETED | OUTPATIENT
Start: 2021-08-30 | End: 2021-08-30

## 2021-08-30 RX ADMIN — KETOROLAC TROMETHAMINE 30 MG: 30 INJECTION, SOLUTION INTRAMUSCULAR; INTRAVENOUS at 23:32

## 2021-08-30 RX ADMIN — SODIUM CHLORIDE 1000 ML: 9 INJECTION, SOLUTION INTRAVENOUS at 22:04

## 2021-08-30 RX ADMIN — HYDROMORPHONE HYDROCHLORIDE 1 MG: 1 INJECTION, SOLUTION INTRAMUSCULAR; INTRAVENOUS; SUBCUTANEOUS at 22:04

## 2021-08-30 RX ADMIN — HYDROMORPHONE HYDROCHLORIDE 0.5 MG: 1 INJECTION, SOLUTION INTRAMUSCULAR; INTRAVENOUS; SUBCUTANEOUS at 23:32

## 2021-08-30 RX ADMIN — ONDANSETRON 4 MG: 2 INJECTION INTRAMUSCULAR; INTRAVENOUS at 22:04

## 2021-08-30 ASSESSMENT — ENCOUNTER SYMPTOMS
VOMITING: 0
BACK PAIN: 0
CHEST TIGHTNESS: 0
SHORTNESS OF BREATH: 0
ABDOMINAL PAIN: 0
COLOR CHANGE: 0
NAUSEA: 0

## 2021-08-30 ASSESSMENT — PAIN DESCRIPTION - PAIN TYPE: TYPE: CHRONIC PAIN

## 2021-08-30 ASSESSMENT — PAIN SCALES - GENERAL
PAINLEVEL_OUTOF10: 8
PAINLEVEL_OUTOF10: 9
PAINLEVEL_OUTOF10: 8

## 2021-08-31 LAB
ABO/RH: NORMAL
ALBUMIN SERPL-MCNC: 4 G/DL (ref 3.4–5)
ANION GAP SERPL CALCULATED.3IONS-SCNC: 9 MMOL/L (ref 3–16)
ANTIBODY SCREEN: NORMAL
BASOPHILS ABSOLUTE: 0.1 K/UL (ref 0–0.2)
BASOPHILS RELATIVE PERCENT: 0.5 %
BLOOD BANK DISPENSE STATUS: NORMAL
BLOOD BANK PRODUCT CODE: NORMAL
BPU ID: NORMAL
BUN BLDV-MCNC: 10 MG/DL (ref 7–20)
CALCIUM SERPL-MCNC: 9.4 MG/DL (ref 8.3–10.6)
CHLORIDE BLD-SCNC: 108 MMOL/L (ref 99–110)
CO2: 23 MMOL/L (ref 21–32)
CREAT SERPL-MCNC: 0.7 MG/DL (ref 0.9–1.3)
DESCRIPTION BLOOD BANK: NORMAL
EOSINOPHILS ABSOLUTE: 0.1 K/UL (ref 0–0.6)
EOSINOPHILS RELATIVE PERCENT: 0.7 %
FERRITIN: 1442 NG/ML (ref 30–400)
FOLATE: 12.82 NG/ML (ref 4.78–24.2)
GFR AFRICAN AMERICAN: >60
GFR NON-AFRICAN AMERICAN: >60
GLUCOSE BLD-MCNC: 123 MG/DL (ref 70–99)
HCT VFR BLD CALC: 22 % (ref 40.5–52.5)
HEMATOLOGY PATH CONSULT: NO
HEMOGLOBIN: 7.5 G/DL (ref 13.5–17.5)
INR BLD: 1.22 (ref 0.88–1.12)
JAK2 QNT, SOURCE: NORMAL
JAK2 V617F MUTATION: NOT DETECTED
JAK2 V617F PERCENT MUTATED ALLELES: NORMAL
LYMPHOCYTES ABSOLUTE: 4.8 K/UL (ref 1–5.1)
LYMPHOCYTES RELATIVE PERCENT: 26.3 %
MCH RBC QN AUTO: 36.5 PG (ref 26–34)
MCHC RBC AUTO-ENTMCNC: 34.3 G/DL (ref 31–36)
MCV RBC AUTO: 106.5 FL (ref 80–100)
MONOCYTES ABSOLUTE: 1.9 K/UL (ref 0–1.3)
MONOCYTES RELATIVE PERCENT: 10.4 %
NEUTROPHILS ABSOLUTE: 11.5 K/UL (ref 1.7–7.7)
NEUTROPHILS RELATIVE PERCENT: 62.1 %
PDW BLD-RTO: 24.1 % (ref 12.4–15.4)
PHOSPHORUS: 4.5 MG/DL (ref 2.5–4.9)
PLATELET # BLD: 175 K/UL (ref 135–450)
PMV BLD AUTO: 8.3 FL (ref 5–10.5)
POTASSIUM REFLEX MAGNESIUM: 4.1 MMOL/L (ref 3.5–5.1)
PROTHROMBIN TIME: 13.9 SEC (ref 9.9–12.7)
RBC # BLD: 2.07 M/UL (ref 4.2–5.9)
SODIUM BLD-SCNC: 140 MMOL/L (ref 136–145)
SPECIMEN STATUS: NORMAL
VITAMIN B-12: 388 PG/ML (ref 211–911)
WBC # BLD: 18.4 K/UL (ref 4–11)

## 2021-08-31 PROCEDURE — G0378 HOSPITAL OBSERVATION PER HR: HCPCS

## 2021-08-31 PROCEDURE — 83020 HEMOGLOBIN ELECTROPHORESIS: CPT

## 2021-08-31 PROCEDURE — 6370000000 HC RX 637 (ALT 250 FOR IP): Performed by: NURSE PRACTITIONER

## 2021-08-31 PROCEDURE — 85610 PROTHROMBIN TIME: CPT

## 2021-08-31 PROCEDURE — 2580000003 HC RX 258: Performed by: NURSE PRACTITIONER

## 2021-08-31 PROCEDURE — 85025 COMPLETE CBC W/AUTO DIFF WBC: CPT

## 2021-08-31 PROCEDURE — 6360000002 HC RX W HCPCS: Performed by: NURSE PRACTITIONER

## 2021-08-31 PROCEDURE — 96376 TX/PRO/DX INJ SAME DRUG ADON: CPT

## 2021-08-31 PROCEDURE — 36430 TRANSFUSION BLD/BLD COMPNT: CPT

## 2021-08-31 PROCEDURE — 82746 ASSAY OF FOLIC ACID SERUM: CPT

## 2021-08-31 PROCEDURE — 36415 COLL VENOUS BLD VENIPUNCTURE: CPT

## 2021-08-31 PROCEDURE — 82040 ASSAY OF SERUM ALBUMIN: CPT

## 2021-08-31 PROCEDURE — 82728 ASSAY OF FERRITIN: CPT

## 2021-08-31 PROCEDURE — 80048 BASIC METABOLIC PNL TOTAL CA: CPT

## 2021-08-31 PROCEDURE — 6370000000 HC RX 637 (ALT 250 FOR IP): Performed by: INTERNAL MEDICINE

## 2021-08-31 PROCEDURE — 96361 HYDRATE IV INFUSION ADD-ON: CPT

## 2021-08-31 PROCEDURE — 82607 VITAMIN B-12: CPT

## 2021-08-31 PROCEDURE — 84100 ASSAY OF PHOSPHORUS: CPT

## 2021-08-31 PROCEDURE — 6370000000 HC RX 637 (ALT 250 FOR IP): Performed by: EMERGENCY MEDICINE

## 2021-08-31 RX ORDER — ONDANSETRON 2 MG/ML
4 INJECTION INTRAMUSCULAR; INTRAVENOUS EVERY 6 HOURS PRN
Status: DISCONTINUED | OUTPATIENT
Start: 2021-08-31 | End: 2021-09-02 | Stop reason: HOSPADM

## 2021-08-31 RX ORDER — OXYCODONE HCL 10 MG/1
20 TABLET, FILM COATED, EXTENDED RELEASE ORAL EVERY 12 HOURS SCHEDULED
Status: DISCONTINUED | OUTPATIENT
Start: 2021-08-31 | End: 2021-09-02 | Stop reason: HOSPADM

## 2021-08-31 RX ORDER — SODIUM CHLORIDE 9 MG/ML
25 INJECTION, SOLUTION INTRAVENOUS PRN
Status: DISCONTINUED | OUTPATIENT
Start: 2021-08-31 | End: 2021-09-02 | Stop reason: HOSPADM

## 2021-08-31 RX ORDER — HYDROMORPHONE HCL 110MG/55ML
1 PATIENT CONTROLLED ANALGESIA SYRINGE INTRAVENOUS
Status: COMPLETED | OUTPATIENT
Start: 2021-08-31 | End: 2021-08-31

## 2021-08-31 RX ORDER — HYDROXYUREA 500 MG/1
1000 CAPSULE ORAL 2 TIMES DAILY
Status: DISCONTINUED | OUTPATIENT
Start: 2021-08-31 | End: 2021-09-01

## 2021-08-31 RX ORDER — ALBUTEROL SULFATE 90 UG/1
1 AEROSOL, METERED RESPIRATORY (INHALATION) EVERY 4 HOURS PRN
Status: DISCONTINUED | OUTPATIENT
Start: 2021-08-31 | End: 2021-09-02 | Stop reason: HOSPADM

## 2021-08-31 RX ORDER — HYDROXYUREA 500 MG/1
1000 CAPSULE ORAL 2 TIMES DAILY
Status: DISCONTINUED | OUTPATIENT
Start: 2021-08-31 | End: 2021-08-31

## 2021-08-31 RX ORDER — ACETAMINOPHEN 325 MG/1
650 TABLET ORAL EVERY 6 HOURS PRN
Status: DISCONTINUED | OUTPATIENT
Start: 2021-08-31 | End: 2021-09-02 | Stop reason: HOSPADM

## 2021-08-31 RX ORDER — ACETAMINOPHEN 650 MG/1
650 SUPPOSITORY RECTAL EVERY 6 HOURS PRN
Status: DISCONTINUED | OUTPATIENT
Start: 2021-08-31 | End: 2021-09-02 | Stop reason: HOSPADM

## 2021-08-31 RX ORDER — HYDROMORPHONE HCL 110MG/55ML
1 PATIENT CONTROLLED ANALGESIA SYRINGE INTRAVENOUS
Status: DISCONTINUED | OUTPATIENT
Start: 2021-08-31 | End: 2021-09-02 | Stop reason: HOSPADM

## 2021-08-31 RX ORDER — SODIUM CHLORIDE 9 MG/ML
INJECTION, SOLUTION INTRAVENOUS PRN
Status: DISCONTINUED | OUTPATIENT
Start: 2021-08-31 | End: 2021-09-02 | Stop reason: HOSPADM

## 2021-08-31 RX ORDER — SODIUM CHLORIDE 9 MG/ML
INJECTION, SOLUTION INTRAVENOUS CONTINUOUS
Status: ACTIVE | OUTPATIENT
Start: 2021-08-31 | End: 2021-08-31

## 2021-08-31 RX ORDER — ACETAMINOPHEN 325 MG/1
650 TABLET ORAL ONCE
Status: COMPLETED | OUTPATIENT
Start: 2021-08-31 | End: 2021-08-31

## 2021-08-31 RX ORDER — HYDROXYUREA 500 MG/1
1000 CAPSULE ORAL DAILY
Status: DISCONTINUED | OUTPATIENT
Start: 2021-09-01 | End: 2021-08-31

## 2021-08-31 RX ORDER — OXYCODONE HYDROCHLORIDE 5 MG/1
10 TABLET ORAL ONCE
Status: COMPLETED | OUTPATIENT
Start: 2021-08-31 | End: 2021-08-31

## 2021-08-31 RX ORDER — SODIUM CHLORIDE 0.9 % (FLUSH) 0.9 %
5-40 SYRINGE (ML) INJECTION PRN
Status: DISCONTINUED | OUTPATIENT
Start: 2021-08-31 | End: 2021-09-02 | Stop reason: HOSPADM

## 2021-08-31 RX ORDER — DIPHENHYDRAMINE HCL 25 MG
25 TABLET ORAL ONCE
Status: COMPLETED | OUTPATIENT
Start: 2021-08-31 | End: 2021-08-31

## 2021-08-31 RX ORDER — SODIUM CHLORIDE 0.9 % (FLUSH) 0.9 %
5-40 SYRINGE (ML) INJECTION EVERY 12 HOURS SCHEDULED
Status: DISCONTINUED | OUTPATIENT
Start: 2021-08-31 | End: 2021-09-02 | Stop reason: HOSPADM

## 2021-08-31 RX ORDER — LEVALBUTEROL 1.25 MG/.5ML
0.31 SOLUTION, CONCENTRATE RESPIRATORY (INHALATION) EVERY 4 HOURS PRN
Status: DISCONTINUED | OUTPATIENT
Start: 2021-08-31 | End: 2021-09-02 | Stop reason: HOSPADM

## 2021-08-31 RX ORDER — POLYETHYLENE GLYCOL 3350 17 G/17G
17 POWDER, FOR SOLUTION ORAL DAILY PRN
Status: DISCONTINUED | OUTPATIENT
Start: 2021-08-31 | End: 2021-09-02 | Stop reason: HOSPADM

## 2021-08-31 RX ORDER — ONDANSETRON 4 MG/1
4 TABLET, ORALLY DISINTEGRATING ORAL EVERY 8 HOURS PRN
Status: DISCONTINUED | OUTPATIENT
Start: 2021-08-31 | End: 2021-09-02 | Stop reason: HOSPADM

## 2021-08-31 RX ORDER — FOLIC ACID 1 MG/1
2 TABLET ORAL DAILY
Status: DISCONTINUED | OUTPATIENT
Start: 2021-08-31 | End: 2021-09-02 | Stop reason: HOSPADM

## 2021-08-31 RX ORDER — OXYCODONE HYDROCHLORIDE 5 MG/1
10 TABLET ORAL EVERY 4 HOURS PRN
Status: DISCONTINUED | OUTPATIENT
Start: 2021-08-31 | End: 2021-09-01

## 2021-08-31 RX ADMIN — HYDROMORPHONE HYDROCHLORIDE 1 MG: 2 INJECTION, SOLUTION INTRAMUSCULAR; INTRAVENOUS; SUBCUTANEOUS at 18:06

## 2021-08-31 RX ADMIN — ACETAMINOPHEN 650 MG: 325 TABLET ORAL at 13:56

## 2021-08-31 RX ADMIN — HYDROMORPHONE HYDROCHLORIDE 1 MG: 2 INJECTION, SOLUTION INTRAMUSCULAR; INTRAVENOUS; SUBCUTANEOUS at 09:16

## 2021-08-31 RX ADMIN — HYDROMORPHONE HYDROCHLORIDE 1 MG: 2 INJECTION, SOLUTION INTRAMUSCULAR; INTRAVENOUS; SUBCUTANEOUS at 12:19

## 2021-08-31 RX ADMIN — OXYCODONE 10 MG: 5 TABLET ORAL at 03:57

## 2021-08-31 RX ADMIN — HYDROMORPHONE HYDROCHLORIDE 1 MG: 2 INJECTION, SOLUTION INTRAMUSCULAR; INTRAVENOUS; SUBCUTANEOUS at 02:56

## 2021-08-31 RX ADMIN — HYDROMORPHONE HYDROCHLORIDE 1 MG: 2 INJECTION, SOLUTION INTRAMUSCULAR; INTRAVENOUS; SUBCUTANEOUS at 15:11

## 2021-08-31 RX ADMIN — OXYCODONE HYDROCHLORIDE 20 MG: 10 TABLET, FILM COATED, EXTENDED RELEASE ORAL at 20:05

## 2021-08-31 RX ADMIN — FOLIC ACID 2 MG: 1 TABLET ORAL at 09:18

## 2021-08-31 RX ADMIN — SODIUM CHLORIDE: 9 INJECTION, SOLUTION INTRAVENOUS at 02:58

## 2021-08-31 RX ADMIN — DIPHENHYDRAMINE HCL 25 MG: 25 TABLET ORAL at 13:57

## 2021-08-31 RX ADMIN — HYDROMORPHONE HYDROCHLORIDE 1 MG: 2 INJECTION, SOLUTION INTRAMUSCULAR; INTRAVENOUS; SUBCUTANEOUS at 21:19

## 2021-08-31 RX ADMIN — HYDROXYUREA 1000 MG: 500 CAPSULE ORAL at 20:05

## 2021-08-31 RX ADMIN — OXYCODONE 10 MG: 5 TABLET ORAL at 07:54

## 2021-08-31 RX ADMIN — OXYCODONE 10 MG: 5 TABLET ORAL at 01:36

## 2021-08-31 RX ADMIN — SODIUM CHLORIDE, PRESERVATIVE FREE 10 ML: 5 INJECTION INTRAVENOUS at 07:57

## 2021-08-31 RX ADMIN — OXYCODONE HYDROCHLORIDE 20 MG: 10 TABLET, FILM COATED, EXTENDED RELEASE ORAL at 09:13

## 2021-08-31 RX ADMIN — OXYCODONE 10 MG: 5 TABLET ORAL at 13:56

## 2021-08-31 RX ADMIN — SODIUM CHLORIDE, PRESERVATIVE FREE 10 ML: 5 INJECTION INTRAVENOUS at 20:06

## 2021-08-31 RX ADMIN — HYDROXYUREA 1000 MG: 500 CAPSULE ORAL at 12:18

## 2021-08-31 RX ADMIN — HYDROMORPHONE HYDROCHLORIDE 1 MG: 2 INJECTION, SOLUTION INTRAMUSCULAR; INTRAVENOUS; SUBCUTANEOUS at 05:56

## 2021-08-31 RX ADMIN — Medication 10 ML: at 02:56

## 2021-08-31 ASSESSMENT — PAIN SCALES - GENERAL
PAINLEVEL_OUTOF10: 8
PAINLEVEL_OUTOF10: 9
PAINLEVEL_OUTOF10: 8
PAINLEVEL_OUTOF10: 9
PAINLEVEL_OUTOF10: 8

## 2021-08-31 ASSESSMENT — PAIN DESCRIPTION - LOCATION: LOCATION: GENERALIZED

## 2021-08-31 ASSESSMENT — PAIN - FUNCTIONAL ASSESSMENT: PAIN_FUNCTIONAL_ASSESSMENT: ACTIVITIES ARE NOT PREVENTED

## 2021-08-31 ASSESSMENT — PAIN DESCRIPTION - PAIN TYPE
TYPE: ACUTE PAIN
TYPE: ACUTE PAIN;OTHER (COMMENT)

## 2021-08-31 NOTE — H&P
Hospital Medicine History & Physical      PCP: Pat Hernandes MD    Date of Admission: 8/30/2021    Date of Service: Pt seen/examined on 8/31/2021 and Admitted to observation with expected LOS less than two midnights due to medical therapy. Chief Complaint:  Sickle cell pain crisis    History Of Present Illness:      24 y.o. male, with PMH of sickle cell anemia and asthma, who presented to [facility] with sickle cell pain crisis. History obtained from the patient and review of EMR. The patient stated he has been experiencing joint pain d/t his sickle cell crisis for the last week. He stated he was recently admitted here at [facility] and just discharged home 4 days ago. The patient stated he thought he could control his pain at home, but he has been unable to do so. He stated he has been having bilateral knee and elbow pain that has been unresolved with his oxycodone. In the emergency room the patient was given multiple doses of dilaudid, toradol and oxycodone. He will be admitted for further evaluation and treatment. Hem/onc has been consulted for the am. The patient denied any other associated symptoms as well as any aggravating and/or alleviating factors. At the time of this assessment, the patient was resting comfortably in bed. He currently denies any chest pain, back pain, abdominal pain, shortness of breath, numbness, tingling, N/V/C/D, fever and/or chills. 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 Authorizing Provider   oxyCODONE HCl (OXY-IR) 10 MG immediate release tablet Take 10 mg by mouth every 4 hours as needed for Pain.     Historical Provider, MD albuterol sulfate HFA (PROAIR HFA) 108 (90 Base) MCG/ACT inhaler Albuterol HFA Inhaler 90 mcg/actuation  inhaled  2 puffs prn     Active    Historical Provider, MD   mometasone-formoterol (DULERA) 100-5 MCG/ACT inhaler Mometasone-Formoterol HFA Inhaler 100 mcg-5 mcg/actuation  inhaled  2 puffs every am     Active    Historical Provider, MD   levalbuterol (XOPENEX) 0.31 MG/3ML nebulization Levalbuterol Nebulized    2 puffs prn     Active    Historical Provider, MD   lactulose (CHRONULAC) 10 GM/15ML solution Take 15 mLs by mouth as needed     Historical Provider, MD   docusate (COLACE, DULCOLAX) 100 MG CAPS Take 100 mg by mouth    Historical Provider, MD   ibuprofen (ADVIL;MOTRIN) 800 MG tablet ibuprofen 800 MG Oral Tablet  oral   prn    Active    Historical Provider, MD   oxyCODONE (OXYCONTIN) 20 MG extended release tablet TAKE 1 TABLET BY MOUTH EVERY 12 HOURS. TAKE WITH ENOUGH WATER TO SWALLOW WHOLE. DO NOT CRUSH/DISSOLVE/CHEW/CUT/BREAK. 4/12/21   Historical Provider, MD   hydroxyurea (HYDREA) 500 MG chemo capsule Take 2 capsules by mouth 2 times daily 12/9/20   Evan Zambrano MD     Allergies:  Morphine    Social History:      The patient currently lives at home    TOBACCO:   reports that he has never smoked. He has never used smokeless tobacco.  ETOH:   reports previous alcohol use. E-Cigarettes/Vaping Use     Questions Responses    E-Cigarette/Vaping Use Never User    Start Date     Passive Exposure     Quit Date     Counseling Given     Comments         Family History:      History reviewed. No pertinent family history. REVIEW OF SYSTEMS COMPLETED:   Pertinent positives as noted in the HPI. All other systems reviewed and negative.     PHYSICAL EXAM PERFORMED:    /65   Pulse 69   Temp 99.1 °F (37.3 °C) (Oral)   Resp 18   Ht 5' 8\" (1.727 m)   Wt 180 lb (81.6 kg)   SpO2 98%   BMI 27.37 kg/m²     General appearance:  Pleasant male in no apparent distress, appears stated age and hydrea  -MIVF  -prn pain medication  -hem/onc consult - appreciate recommendations in advance    Leukocytosis, 19.2 on admission  -likely 2/2 stress response  -cbc in am    Asthma, stable  -does not appear to be in acute exacerbation at this time  -continue home inhalers and nebulizers    Chronic macrocytic anemia, 9.7/28.6 on admission  -no s/s of bleeding at this time  -cbc in am    DVT Prophylaxis: Lovenox    Diet: No diet orders on file     Code Status: Prior    PT/OT Eval Status: No indication for need at this time    Dispo - 1-2 days pending clinical improvement     Mauricio Luz, APRN - CNP    Thank you Lolis Cuellar MD for the opportunity to be involved in this patient's care.  If you have any questions or concerns please feel free to contact me at [phone].  --------------------------------Anticipated Dr. Larissa schmitz-----------------------------------------

## 2021-08-31 NOTE — ED NOTES
Ambulated pt approx 30 feet o room air w/o assistance pt has steady and even gait. Pt complains of bilateral knee, shoulder, and elbow pain. However, no complaints of SOB, CP lightheadedness or dizziness.       Fredi Lorenzana  08/31/21 0002

## 2021-08-31 NOTE — PROGRESS NOTES
Full consult to follow  Discussed with Dr. Marcos Mcdowell- needs a red cell exchange with [facility] due to prolonged pain crisis. Will need a non tunneled temp dialysis line placed. I will call [facility] to arrange the apheresis.

## 2021-08-31 NOTE — CONSULTS
Hematology/Oncology Consultation         Patient:   [facility]       Reason for Consult:  Sickle cell crisis   Requesting Physician: No ref. provider found    Chief Complaint:     Chief Complaint   Patient presents with    Sickle Cell Pain Crisis     Pt c/o joint pain r/t sickle cell crisis. Pt was seen here last week and reports he thought he could control the pain at home. Pt had his oxycodone around 1700 per pt. Reports no relief. History Obtained from:     patient, electronic medical record    History of Present Illness:     [facility] is a 24 y.o. male  with significant past medical history of sickle cell crisis who presents with prolonged crisis pain and Hgb of 7.5. Denies HA and chest pain now. He is undecided on treatment with a red cell exchange as he is \"freaked out\" by the thought of a temp dialysis line. Kidney function is stable. Bili is 4.5. WBC 18, plt 175. Low grade temp of 99.1. 97 percent on RA. BP and HR are unremarkable. NO family in room. He was just released from hospital last week.      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)     Sickle cell anemia (HCC)        Past Surgical History:         Procedure Laterality Date    SPLENECTOMY         Current Medications:     Current Facility-Administered Medications   Medication Dose Route Frequency Provider Last Rate Last Admin    albuterol sulfate  (90 Base) MCG/ACT inhaler 1 puff  1 puff Inhalation Q4H PRN Candance Silvius, APRN - LYNNETTE        hydroxyurea (HYDREA) chemo capsule 1,000 mg  1,000 mg Oral BID Candance Silvius, APRN - LYNNETTE        levalbuterol (XOPENEX) nebulizer solution 0.3 mg  0.3 mg Nebulization Q4H PRN Candance Silvius, APRN - CNP        oxyCODONE (OXYCONTIN) extended release tablet 20 mg  20 mg Oral 2 times per day Candance Silvius, APRN - CNP        oxyCODONE (ROXICODONE) immediate release tablet 10 mg  10 mg Oral Q4H PRN Candance Silvius, APRN - CNP   10 mg at 08/31/21 0754    sodium chloride flush 0.9 % injection 5-40 mL  5-40 mL IntraVENous 2 times per day Candance Silvius, APRN - CNP   10 mL at 08/31/21 0757    sodium chloride flush 0.9 % injection 5-40 mL  5-40 mL IntraVENous PRN Candance Silvius, APRN - CNP   10 mL at 08/31/21 0256    0.9 % sodium chloride infusion  25 mL IntraVENous PRN Candance Silvius, APRN - CNP        enoxaparin (LOVENOX) injection 40 mg  40 mg SubCUTAneous Daily Candance Silvius, APRN - CNP        ondansetron (ZOFRAN-ODT) disintegrating tablet 4 mg  4 mg Oral Q8H PRN Candance Silvius, APRN - CNP        Or    ondansetron (ZOFRAN) injection 4 mg  4 mg IntraVENous Q6H PRN Candance Silvius, APRN - CNP        polyethylene glycol (GLYCOLAX) packet 17 g  17 g Oral Daily PRN Candance Silvius, APRN - CNP        acetaminophen (TYLENOL) tablet 650 mg  650 mg Oral Q6H PRN Candance Silvius, APRN - CNP        Or    acetaminophen (TYLENOL) suppository 650 mg  650 mg Rectal Q6H PRN Candance Silvius, APRN - CNP        HYDROmorphone (DILAUDID) injection 1 mg  1 mg IntraVENous Q3H PRN Candance Silvius, APRN - CNP   1 mg at 08/31/21 0556    0.9 % sodium chloride infusion   IntraVENous Continuous Candance Silvius, APRN - CNP 75 mL/hr at 08/31/21 0258 New Bag at 08/31/21 0258    0.9 % sodium chloride infusion   IntraVENous PRN Charolett Led, APRN - CNP           Allergies:      Allergies   Allergen Reactions    Morphine Shortness Of Breath     Other reaction(s): Histamine-like Reaction  Has asthma exacerbation with morphine-histamine type reaction         Social History:     Social History     Socioeconomic History    Marital status: Single     Spouse name: Not on file    Number of children: 0    Years of education: Not on file    Highest education level: Not on file   Occupational History    Not on file   Tobacco Use    Smoking status: Never Smoker    Smokeless tobacco: Never Used   Vaping Use    Vaping Use: Gastrointestinal: No abdominal pain, no appetite loss, no blood in stools. No change in bowel habits. Genitourinary: No dysuria, trouble voiding, or hematuria. Musculoskeletal:  Diffuse joint pain   Integumentary: No rash or pruritis. Neurological: No headache, diplopia. No change in gait, balance, or coordination. No paresthesias. Endocrine: No temperature intolerance. No excessive thirst, fluid intake, or urination. Hematologic/Lymphatic: No abnormal bruising or ecchymoses, no blood clots or swollen lymph nodes. Allergic/Immunologic: No nasal congestion or hives. Physical Exam:     BP (!) 122/56   Pulse 68   Temp 98.6 °F (37 °C) (Oral)   Resp 16   Ht 5' 8\" (1.727 m)   Wt 187 lb 14.4 oz (85.2 kg)   SpO2 97%   BMI 28.57 kg/m²     CONSTITUTIONAL: awake, alert, cooperative, no apparent distress. EYES:  Pupils equal, round and reactive to light, sclera non-icteric, conjunctiva normal  ENT:  Normocephalic, without obvious abnormality, atraumatic, sinuses nontender on palpation, external ears without lesions, oral pharynx with moist mucus membranes, no mucositis. NECK:  Supple, symmetrical, trachea midline, no adenopathy, thyroid symmetric, not enlarged and no tenderness, skin normal  HEMATOLOGIC/LYMPHATICS:  no cervical lymphadenopathy, no supraclavicular lymphadenopathy, no axillary lymphadenopathy and no inguinal lymphadenopathy  BACK:  Symmetric, no curvature, spinous processes are non-tender on palpation, paraspinous muscles are non-tender on palpation, no costal vertebral tenderness  LUNGS:  Clear to auscultation bilaterally, no crackles or wheezing  CARDIOVASCULAR:  Regular rate and rhythm, normal S1 and S2, no S3 or S4, and no murmur noted  ABDOMEN:  Normal bowel sounds, soft, non-distended, non-tender, no masses palpated, no hepatosplenomegally  MUSCULOSKELETAL:  There is no redness, warmth, or swelling of the joints  NEUROLOGIC:   No focal findings.    SKIN:  Scattered bruising and discussed with Javier the benefits of exchange to include limiting crisis, preventing end organ damage and stroke, acute chest ect. He is aware that stroke can cause permanent loss of limb and speech difficulties. He does not wish to speak with other sickle cell patients on this treatment. He confirms there is no additional teaching I can offer him to help him decide. - I discussed with him that the temp dialysis line can be removed after exchange and that this line is placed with local anesthetic and some mild anesthesia   - He is still not sure if he wants to proceed  - I will cont orders for Type and Screen  - If he decides he does not want red cell exchange, will need to order PRBC transfusion. He was conseled that the preferred treatment for prolonged pain crisis is apheresis. He is aware that traditional PRBC transfusion DOES NOT remove sickled blood. For now, he cont to deny HA or chest pain. \    He is not due for Oxy IR script outpatient until Thursday. He is on a weekly Oxy script program as he awaits a pain  appointment with Dr. Brina Dasilva. Await final decision regarding red cell exchange. Thank you very much for allowing me to participate in the care of this patient.     Aleks Gabriel, LYNNETTE   Medical Oncology/Hematology    [facility] [place]  [address],  [place] [address]  Phone: [phone]  Fax: [phone]

## 2021-08-31 NOTE — PROGRESS NOTES
08/31/21 0400   RT Protocol   Smoking Status 0   Surgical status 0   Xray 0.0   Respiratory pattern 0   Mental Status 0   Breath sounds 0   Cough 0   Activity level 0   Oxygen Requirement 0   Indications for Bronchodilator Therapy On home bronchodilators   Bronchodilator Assessment Score 0   Indications for Bronchial Hygiene None   Bronchial Hygiene Assessment Score 0   Indications for Volume Expansion None   Volume Expansion Assessment Score 0

## 2021-08-31 NOTE — ACP (ADVANCE CARE PLANNING)
Advance Care Planning     General Advance Care Planning (ACP) Conversation    Date of Conversation: 8/30/2021  Conducted with: Patient with Decision Making Capacity    Healthcare Decision Maker:    Primary Decision Maker: Issac Ponce - Parent - [phone]    Secondary Decision Maker: Kina Silva - Parent - [phone]  Click here to complete Healthcare Decision Makers including selection of the Healthcare Decision Maker Relationship (ie \"Primary\").        Content/Action Overview:  Has NO ACP documents/care preferences - information provided, considering goals and options  Reviewed DNR/DNI and patient elects Full Code (Attempt Resuscitation)      Length of Voluntary ACP Conversation in minutes:  <16 minutes (Non-Billable)    Chano Lemon, SHAKEEL Lemon, RN
- - -

## 2021-08-31 NOTE — ED PROVIDER NOTES
EMERGENCY DEPARTMENT ENCOUNTER        Pt Name: Xiao Humphrey  MRN: [id number]  [place] 2000  Date of evaluation: 8/30/2021  Provider: Josiah Roberts MD  PCP: Rachael Benson MD      CHIEF COMPLAINT       Chief Complaint   Patient presents with    Sickle Cell Pain Crisis     Pt c/o joint pain r/t sickle cell crisis. Pt was seen here last week and reports he thought he could control the pain at home. Pt had his oxycodone around 1700 per pt. Reports no relief. HISTORY OFPRESENT ILLNESS   (Location/Symptom, Timing/Onset, Context/Setting, Quality, Duration, Modifying Factors,Severity)  Note limiting factors. Xiao Humphrey is a 24 y.o. male presenting today due to concern for having persistent pain since being discharged from the hospital a few days ago but stating his bilateral knees along with bilateral elbows and shoulders have worsened since being at home. He tried taking oxycodone but nothing was helping. He denies any chest pain or shortness of breath. He denies any neck or back pain. He states normally with his sickle cell crisis he will have pain in his back but denies any currently. No abdominal pain. No fever or vomiting. No headache. No falls or trauma. He states that any type of movement or attempted walking makes the pain significant so he came to the emergency department for further evaluation. He is allergic to morphine but can take Dilaudid. REVIEW OF SYSTEMS    (2-9 systems for level 4, 10 or more for level 5)     Review of Systems   Constitutional: Negative for chills, diaphoresis, fatigue and fever. HENT: Negative for congestion. Respiratory: Negative for chest tightness and shortness of breath. Cardiovascular: Negative for chest pain. Gastrointestinal: Negative for abdominal pain, nausea and vomiting. Genitourinary: Negative for dysuria, flank pain, penile pain, penile swelling and scrotal swelling.    Musculoskeletal: Positive for arthralgias and gait problem. Negative for back pain and neck pain. Skin: Negative for color change. Neurological: Negative for weakness, numbness and headaches. Psychiatric/Behavioral: Negative for confusion. Positives and Pertinent negatives as per HPI. 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 albuterol sulfate HFA (PROAIR HFA) 108 (90 Base) MCG/ACT inhaler Albuterol HFA Inhaler 90 mcg/actuation  inhaled  2 puffs prn     Active      levalbuterol (XOPENEX) 0.31 MG/3ML nebulization Levalbuterol Nebulized    2 puffs prn     Active      lactulose (CHRONULAC) 10 GM/15ML solution Take 15 mLs by mouth as needed       docusate (COLACE, DULCOLAX) 100 MG CAPS Take 100 mg by mouth      ibuprofen (ADVIL;MOTRIN) 800 MG tablet ibuprofen 800 MG Oral Tablet  oral   prn    Active      oxyCODONE (OXYCONTIN) 20 MG extended release tablet TAKE 1 TABLET BY MOUTH EVERY 12 HOURS.  TAKE WITH ENOUGH WATER TO SWALLOW WHOLE. DO NOT CRUSH/DISSOLVE/CHEW/CUT/BREAK.      hydroxyurea (HYDREA) 500 MG chemo capsule Take 2 capsules by mouth 2 times daily  Qty:               ALLERGIES     Morphine    FAMILY HISTORY       Family History   Problem Relation Age of Onset    Other Father         sarcoidosis          SOCIAL HISTORY       Social History     Socioeconomic History    Marital status: Single     Spouse name: None    Number of children: 0    Years of education: None    Highest education level: None   Occupational History No focal deficit present. Mental Status: He is alert and oriented to person, place, and time. Mental status is at baseline. GCS: GCS eye subscore is 4. GCS verbal subscore is 5. GCS motor subscore is 6. Cranial Nerves: No dysarthria. Sensory: Sensation is intact. No sensory deficit. Motor: Motor function is intact. No weakness, tremor, atrophy, abnormal muscle tone or seizure activity. Comments: Normal  strength bilaterally and normal ankle dorsiflexion/plantarflexion bilaterally with strength 5 out of 5, limited extremity exam due to pain in multiple joints   Psychiatric:         Attention and Perception: Attention normal.         Mood and Affect: Affect normal. Mood is anxious. Speech: Speech normal. Speech is not slurred. Behavior: Behavior normal. Behavior is cooperative.              DIAGNOSTIC RESULTS   :    Labs Reviewed   CBC WITH AUTO DIFFERENTIAL - Abnormal; Notable for the following components:       Result Value    WBC 19.2 (*)     RBC 2.75 (*)     Hemoglobin 9.7 (*)     Hematocrit 28.6 (*)     .0 (*)     MCH 35.4 (*)     RDW 25.1 (*)     Neutrophils Absolute 15.4 (*)     Anisocytosis 2+ (*)     Macrocytes 1+ (*)     Microcytes Occasional (*)     Polychromasia Occasional (*)     Poikilocytes 2+ (*)     Ovalocytes Occasional (*)     Target Cells Occasional (*)     Basophilic Stippling Occasional (*)     Sickle Cells Occasional (*)     All other components within normal limits    Narrative:     Performed at:  [address],  [place], [facility]   Phone [phone]   COMPREHENSIVE METABOLIC PANEL - Abnormal; Notable for the following components:    Glucose 101 (*)     CREATININE 0.8 (*)     Total Protein 8.5 (*)     Total Bilirubin 4.5 (*)     Alkaline Phosphatase 190 (*)     AST 38 (*)     All other components within normal limits    Narrative:     Performed at:  [address] [place] [address] Laboratory  [address] [place] [address], [facility] [place]   Phone [phone]   RETICULOCYTES - Abnormal; Notable for the following components:    Retic Ct Pct 14.14 (*)     Immature Retic Fract 0.62 (*)     All other components within normal limits    Narrative:     Performed at:  [place] [address] [place]   Phone [phone]   BASIC METABOLIC PANEL W/ REFLEX TO MG FOR LOW K - Abnormal; Notable for the following components:    Glucose 123 (*)     CREATININE 0.7 (*)     All other components within normal limits    Narrative:     Performed at:  [place] [address] [place]   Phone [phone]   CBC WITH AUTO DIFFERENTIAL - Abnormal; Notable for the following components:    WBC 18.4 (*)     RBC 2.07 (*)     Hemoglobin 7.5 (*)     Hematocrit 22.0 (*)     .5 (*)     MCH 36.5 (*)     RDW 24.1 (*)     Neutrophils Absolute 11.5 (*)     Monocytes Absolute 1.9 (*)     All other components within normal limits    Narrative:     Performed at:  [place] [address] [place]   Phone [phone] - Abnormal; Notable for the following components:    Protime 13.9 (*)     INR 1.22 (*)     All other components within normal limits    Narrative:     Performed at:  [place] [address] [place]   Phone [phone] - Abnormal; Notable for the following components:    Ferritin 1,442.0 (*)     All other components within normal limits    Narrative:     Performed at:  [facility]  [address] [place], [place] [address]   Phone [phone]   [facility]    Narrative:     Performed at:  [address], [facility] [place]   Phone [phone]   [facility] [place] Narrative:     Collection has been rescheduled by [place] at 08/31/2021 08:20 Reason:   Failed attempt at venipuncture  Performed at:  [facility]  [address] [facility]  [place] [address]   Phone [phone]   ALBUMIN    Narrative:     Performed at:  [facility]) [address], [facility] [employer]   Phone [phone]   PHOSPHORUS    Narrative:     Performed at:  [facility]) [address], [facility] [employer]   Phone [phone] EVALUATION   TYPE AND SCREEN    Narrative:     Performed at:  [place] [address] [employer]   Phone [phone]   PREPARE RBC (CROSSMATCH)       All other labs were within normal range or not returned asof this dictation. EKG: All EKG's are interpreted by the Emergency Department Physician who either signs or Co-signs this chart in the absence of a cardiologist.        RADIOLOGY:   Non-plain film images such as CT, Ultrasound and MRI are read by the radiologist. Cleophus Ahr images are visualized and preliminarily interpreted by the  ED Provider with the belowfindings:        Interpretation per the Radiologist below, if available at the time of this note:    IR NONTUNNELED VASCULAR CATHETER > 5 YEARS    (Results Pending)         PROCEDURES   Unless otherwise noted below, none     Procedures    CRITICAL CARE TIME   N/A    CONSULTS: Spoke with Dr. Maulik Benson at [phone] and he feels that the patient can most likely go home as long as his pain can get under control. He states they have tried multiple outpatient maneuvers including family interventions but patient does not seem to abide by these. Paged hospitalist for admission.   IP CONSULT TO HEM/ONC  IP CONSULT TO HEM/ONC    EMERGENCY DEPARTMENT COURSE and DIFFERENTIAL DIAGNOSIS/MDM:   Vitals:    Vitals:    08/31/21 0754 08/31/21 1353 08/31/21 1444 08/31/21 1618   BP: (!) 122/56 108/65 104/64 124/74   Pulse: 68 58 54 54   Resp: 16 16 16 16   Temp: 98.6 °F (37 °C) 97.9 °F (36.6 °C) 98.4 °F (36.9 °C) 97.8 °F (36.6 °C)   TempSrc: Oral Oral Oral Oral   SpO2: 97% 94% 95% 97%   Weight:       Height:           Patient was given the following medications:  Medications   albuterol sulfate  (90 Base) MCG/ACT inhaler 1 puff (has no administration in time range)   levalbuterol (XOPENEX) nebulizer solution 0.3 mg (has no administration in time range)   oxyCODONE (OXYCONTIN) extended release tablet 20 mg (20 mg Oral Given 8/31/21 0913)   oxyCODONE (ROXICODONE) immediate release tablet 10 mg (10 mg Oral Given 8/31/21 1356)   sodium chloride flush 0.9 % injection 5-40 mL (10 mLs IntraVENous Given 8/31/21 0757)   sodium chloride flush 0.9 % injection 5-40 mL (10 mLs IntraVENous Given 8/31/21 0256)   0.9 % sodium chloride infusion (has no administration in time range)   enoxaparin (LOVENOX) injection 40 mg (40 mg SubCUTAneous Not Given 8/31/21 0757)   ondansetron (ZOFRAN-ODT) disintegrating tablet 4 mg (has no administration in time range)     Or   ondansetron (ZOFRAN) injection 4 mg (has no administration in time range)   polyethylene glycol (GLYCOLAX) packet 17 g (has no administration in time range)   acetaminophen (TYLENOL) tablet 650 mg (has no administration in time range)     Or   acetaminophen (TYLENOL) suppository 650 mg (has no administration in time range)   0.9 % sodium chloride infusion ( IntraVENous New Bag 8/31/21 0258)   0.9 % sodium chloride infusion (has no administration in time range)   folic acid (FOLVITE) tablet 2 mg (2 mg Oral Given 8/31/21 0918)   hydroxyurea (HYDREA) chemo capsule 1,000 mg (1,000 mg Oral Given 8/31/21 1218)   HYDROmorphone (DILAUDID) injection 1 mg (1 mg IntraVENous Given 8/31/21 3456)   HYDROmorphone (DILAUDID) injection 1 mg (1 mg IntraVENous Given 8/30/21 2204)   ondansetron (ZOFRAN) injection 4 mg (4 mg IntraVENous Given 8/30/21 2204)   0.9 % sodium chloride bolus (0 mLs IntraVENous Stopped 8/30/21 2327)   ketorolac (TORADOL) injection 30 mg (30 mg IntraVENous Given 8/30/21 2332)   HYDROmorphone (DILAUDID) injection 0.5 mg (0.5 mg IntraVENous Given 8/30/21 2332)   oxyCODONE (ROXICODONE) immediate release tablet 10 mg (10 mg Oral Given 8/31/21 0136)   HYDROmorphone (DILAUDID) injection 1 mg (1 mg IntraVENous Given 8/31/21 1219)   acetaminophen (TYLENOL) tablet 650 mg (650 mg Oral Given 8/31/21 1356)   diphenhydrAMINE (BENADRYL) tablet 25 mg (25 mg Oral Given 8/31/21 1357)     Patient was evaluated having pain and bilateral shoulders/elbows/knees but stating the main pain was in his knees. He does report that he left sooner than he should have during the recent hospitalization and cannot take care of the pain at home at this point with what he has. He denies any trauma and had good range of motion of all joints and at this time I have low suspicion for fracture. No concern for septic joint at this time. He received multiple doses of pain medication was ultimately able to ambulate in the emergency department. I ultimately attempted to discharge the patient but at that point he started complaining of severe pain again and appeared to be in the fetal position. I do wonder if there is a psychiatric component to his pain although since I am unable to gauge what his true level of pain is with sickle cell, I did ultimately decide to admit the patient since at this point the patient does not feel safe going home and states he will most likely just be back in a few hours since he does not believe he will be able to take care of himself at home or deal with the pain. Therefore, hematology will need to see the patient in the hospital.  He was stable for the floor. The patient tolerated their visit well. The patient and / or the family were informed of the results of any tests, a time was given to answer questions.     FINAL IMPRESSION      1. Sickle cell pain crisis ([facility] [address]    2. Intractable pain          DISPOSITION/PLAN   DISPOSITION  -decision to admit      PATIENT REFERRED TO:  No follow-up provider specified.     DISCHARGEMEDICATIONS:  Current Discharge Medication List          DISCONTINUED MEDICATIONS:  Current Discharge Medication List                 (Please note that portions of this note were completed with a voicerecognition program.  Efforts were made to edit the dictations but occasionally words are mis-transcribed.)    Benito Kehr, MD (electronically signed)            Benito Kehr, MD  08/31/21 0122

## 2021-08-31 NOTE — ED NOTES
JESSICA Hen/Onc @ 6040  RE: sickle cell flare-up per Dr. Pineda Anderson called back @ [phone]       Fredi Lorenzana  08/30/21 8460

## 2021-08-31 NOTE — PROGRESS NOTES
Hospitalist Progress Note      PCP: Aly Zee MD    Date of Admission: 8/30/2021    Chief Complaint: Sickle cell pain crisis    Hospital Course: Reviewed H&P    Subjective: Patient seen and examined this morning. Currently resting in bed. Reviewed hematology plan for [facility] placement and Red cell exchange. D/w RN -no new  needs voiced at this time. Medications:  Reviewed    Infusion Medications    sodium chloride      sodium chloride       Scheduled Medications    oxyCODONE  20 mg Oral 2 times per day    sodium chloride flush  5-40 mL IntraVENous 2 times per day    enoxaparin  40 mg SubCUTAneous Daily    folic acid  2 mg Oral Daily    hydroxyurea  1,000 mg Oral BID     PRN Meds: albuterol sulfate HFA, levalbuterol, oxyCODONE HCl, sodium chloride flush, sodium chloride, ondansetron **OR** ondansetron, polyethylene glycol, acetaminophen **OR** acetaminophen, sodium chloride      Intake/Output Summary (Last 24 hours) at 8/31/2021 1501  Last data filed at 8/31/2021 1438  Gross per 24 hour   Intake 960 ml   Output 1000 ml   Net -40 ml       Physical Exam Performed:    /64   Pulse 54   Temp 98.4 °F (36.9 °C) (Oral)   Resp 16   Ht 5' 8\" (1.727 m)   Wt 187 lb 14.4 oz (85.2 kg)   SpO2 95%   BMI 28.57 kg/m²     General appearance: No apparent distress, appears stated age and cooperative. HEENT: Pupils equal, round, and reactive to light. Conjunctivae/corneas clear. Neck: Supple, with full range of motion. No jugular venous distention. Trachea midline. Respiratory:  Normal respiratory effort. Clear to auscultation, bilaterally without Rales/Wheezes/Rhonchi. Cardiovascular: Regular rate and rhythm with normal S1/S2 without murmurs, rubs or gallops. Abdomen: Soft, non-tender, non-distended with normal bowel sounds. Musculoskeletal: No clubbing, cyanosis or edema bilaterally. Full range of motion without deformity.   Skin: Skin color, texture, turgor normal.  No rashes or lesions. Neurologic:  Neurovascularly intact without any focal sensory/motor deficits. Cranial nerves: II-XII intact, grossly non-focal.  Psychiatric: Alert and oriented, thought content appropriate, normal insight  Capillary Refill: Brisk,< 3 seconds   Peripheral Pulses: +2 palpable, equal bilaterally       Labs:   Recent Labs     08/30/21 2157 08/31/21  0547   WBC 19.2* 18.4*   HGB 9.7* 7.5*   HCT 28.6* 22.0*    175     Recent Labs     08/30/21 2157 08/31/21  0547    140   K 4.3 4.1    108   CO2 24 23   BUN 11 10   CREATININE 0.8* 0.7*   CALCIUM 10.0 9.4   PHOS  --  4.5     Recent Labs     08/30/21 2157   AST 38*   ALT 36   BILITOT 4.5*   ALKPHOS 190*     Recent Labs     08/31/21  0900   INR 1.22*     Radiology:  IR NONTUNNELED VASCULAR CATHETER > 5 YEARS    (Results Pending)     Active Hospital Problems    Diagnosis Date Noted    Sickle cell pain crisis ([facility] [address] [D57.00] 07/25/2020     Assessment/Plan:  Sickle cell pain crisis  - received IV fluids, multiple doses of dilaudid ; toradol ; oxycodone given in ED  -continue hydrea  -MIVF  -prn pain medication  -hem/onc consulted -given his prolonged crisis duration plan for red cell exchange after tunneled dialysis catheter placement later today.     Leukocytosis, 19.2 on admission  -likely 2/2 stress response  -cbc this a.m. improving. Asthma, stable  -does not appear to be in acute exacerbation at this time  -continue home inhalers and nebulizers     Chronic macrocytic anemia, 9.7/28.6 on admission  -no s/s of bleeding at this time ; hemoglobin down to 7.5 this morning. Hematology plan for 4 units of red cell exchange today. -Monitor CBC.     DVT Prophylaxis: Lovenox   Diet: ADULT DIET; Regular  Code Status: Full Code    PT/OT Eval Status: Ambulatory     Dispo - Per Hem/onc        The note was completed using Dragon -speech recognition software & EMR  .  Every effort was made to ensure accuracy; however, inadvertent computerized transcription errors may be present.     Oliver Morgan MD

## 2021-08-31 NOTE — PLAN OF CARE
4 Eyes Skin Assessment     The patient is being assess for  Admission    I agree that 2 RN's have performed a thorough Head to Toe Skin Assessment on the patient. ALL assessment sites listed below have been assessed. Areas assessed by both nurses:   [x]   Head, Face, and Ears   [x]   Shoulders, Back, and Chest  [x]   Arms, Elbows, and Hands   [x]   Coccyx, Sacrum, and IschIum  [x]   Legs, Feet, and Heels        Does the Patient have Skin Breakdown?   No         Shaun Prevention initiated:  NA   Wound Care Orders initiated:  NA      [facility] nurse consulted for Pressure Injury (Stage 3,4, Unstageable, DTI, NWPT, and Complex wounds), New and Established Ostomies:  NA      Nurse 1 eSignature: Electronically signed by Racheal Beltran RN on 8/31/21 at 4:06 AM EDT    **SHARE this note so that the co-signing nurse is able to place an eSignature**    Nurse 2 eSignature: Electronically signed by Bakari Tovar RN on 8/31/21 at 4:06 AM EDT

## 2021-08-31 NOTE — PROGRESS NOTES
Apprised that patient is refusing vas cath and red cell exchange. Orders cancelled. Regular diet.    Will order PRBC

## 2021-08-31 NOTE — PROGRESS NOTES
Pt admitted to C3 from ED at 0243. Oriented to room, call light and POC. Nutrition and pain meds provided. Pt denies further needs. VSS. Call light within reach, will continue to monitor.

## 2021-08-31 NOTE — PROGRESS NOTES
Pt A&Ox4. VSS. Shift assessment completed. Pt is refusing vascath placement and red blood cells exchange. However, pt is agreeable to PRBC infusion. Consent signed and in chart- NP notified. Pain controlled with PO and IV medication. Call light within reach, bed in lowest position, wheels locked.

## 2021-09-01 LAB
A/G RATIO: 1.3 (ref 1.1–2.2)
ALBUMIN SERPL-MCNC: 4.3 G/DL (ref 3.4–5)
ALP BLD-CCNC: 157 U/L (ref 40–129)
ALT SERPL-CCNC: 26 U/L (ref 10–40)
ANION GAP SERPL CALCULATED.3IONS-SCNC: 10 MMOL/L (ref 3–16)
AST SERPL-CCNC: 24 U/L (ref 15–37)
BASOPHILS ABSOLUTE: 0.1 K/UL (ref 0–0.2)
BASOPHILS RELATIVE PERCENT: 0.9 %
BILIRUB SERPL-MCNC: 5.1 MG/DL (ref 0–1)
BUN BLDV-MCNC: 9 MG/DL (ref 7–20)
CALCIUM SERPL-MCNC: 9.8 MG/DL (ref 8.3–10.6)
CHLORIDE BLD-SCNC: 104 MMOL/L (ref 99–110)
CO2: 25 MMOL/L (ref 21–32)
CREAT SERPL-MCNC: 0.7 MG/DL (ref 0.9–1.3)
EOSINOPHILS ABSOLUTE: 0.1 K/UL (ref 0–0.6)
EOSINOPHILS RELATIVE PERCENT: 1.3 %
GFR AFRICAN AMERICAN: >60
GFR NON-AFRICAN AMERICAN: >60
GLOBULIN: 3.3 G/DL
GLUCOSE BLD-MCNC: 91 MG/DL (ref 70–99)
HCT VFR BLD CALC: 28.4 % (ref 40.5–52.5)
HEMOGLOBIN: 9.9 G/DL (ref 13.5–17.5)
LYMPHOCYTES ABSOLUTE: 3 K/UL (ref 1–5.1)
LYMPHOCYTES RELATIVE PERCENT: 26.9 %
MCH RBC QN AUTO: 34.5 PG (ref 26–34)
MCHC RBC AUTO-ENTMCNC: 34.8 G/DL (ref 31–36)
MCV RBC AUTO: 99.3 FL (ref 80–100)
MONOCYTES ABSOLUTE: 1.5 K/UL (ref 0–1.3)
MONOCYTES RELATIVE PERCENT: 13.8 %
NEUTROPHILS ABSOLUTE: 6.4 K/UL (ref 1.7–7.7)
NEUTROPHILS RELATIVE PERCENT: 57.1 %
PDW BLD-RTO: 23.6 % (ref 12.4–15.4)
PLATELET # BLD: 255 K/UL (ref 135–450)
PMV BLD AUTO: 8.3 FL (ref 5–10.5)
POTASSIUM SERPL-SCNC: 4.4 MMOL/L (ref 3.5–5.1)
RBC # BLD: 2.86 M/UL (ref 4.2–5.9)
SODIUM BLD-SCNC: 139 MMOL/L (ref 136–145)
TOTAL PROTEIN: 7.6 G/DL (ref 6.4–8.2)
WBC # BLD: 11.2 K/UL (ref 4–11)

## 2021-09-01 PROCEDURE — 6370000000 HC RX 637 (ALT 250 FOR IP): Performed by: NURSE PRACTITIONER

## 2021-09-01 PROCEDURE — 96376 TX/PRO/DX INJ SAME DRUG ADON: CPT

## 2021-09-01 PROCEDURE — 6360000002 HC RX W HCPCS: Performed by: NURSE PRACTITIONER

## 2021-09-01 PROCEDURE — 36415 COLL VENOUS BLD VENIPUNCTURE: CPT

## 2021-09-01 PROCEDURE — 2580000003 HC RX 258: Performed by: NURSE PRACTITIONER

## 2021-09-01 PROCEDURE — G0378 HOSPITAL OBSERVATION PER HR: HCPCS

## 2021-09-01 PROCEDURE — 6370000000 HC RX 637 (ALT 250 FOR IP): Performed by: INTERNAL MEDICINE

## 2021-09-01 PROCEDURE — 80053 COMPREHEN METABOLIC PANEL: CPT

## 2021-09-01 PROCEDURE — 85025 COMPLETE CBC W/AUTO DIFF WBC: CPT

## 2021-09-01 RX ORDER — HYDROXYUREA 500 MG/1
1000 CAPSULE ORAL DAILY
Status: DISCONTINUED | OUTPATIENT
Start: 2021-09-02 | End: 2021-09-02 | Stop reason: HOSPADM

## 2021-09-01 RX ADMIN — OXYCODONE HYDROCHLORIDE 20 MG: 10 TABLET, FILM COATED, EXTENDED RELEASE ORAL at 07:38

## 2021-09-01 RX ADMIN — SODIUM CHLORIDE, PRESERVATIVE FREE 10 ML: 5 INJECTION INTRAVENOUS at 21:24

## 2021-09-01 RX ADMIN — HYDROMORPHONE HYDROCHLORIDE 1 MG: 2 INJECTION, SOLUTION INTRAMUSCULAR; INTRAVENOUS; SUBCUTANEOUS at 13:02

## 2021-09-01 RX ADMIN — HYDROMORPHONE HYDROCHLORIDE 1 MG: 2 INJECTION, SOLUTION INTRAMUSCULAR; INTRAVENOUS; SUBCUTANEOUS at 06:53

## 2021-09-01 RX ADMIN — HYDROMORPHONE HYDROCHLORIDE 1 MG: 2 INJECTION, SOLUTION INTRAMUSCULAR; INTRAVENOUS; SUBCUTANEOUS at 23:07

## 2021-09-01 RX ADMIN — HYDROXYUREA 1000 MG: 500 CAPSULE ORAL at 07:38

## 2021-09-01 RX ADMIN — HYDROMORPHONE HYDROCHLORIDE 1 MG: 2 INJECTION, SOLUTION INTRAMUSCULAR; INTRAVENOUS; SUBCUTANEOUS at 19:49

## 2021-09-01 RX ADMIN — OXYCODONE HYDROCHLORIDE 20 MG: 15 TABLET ORAL at 18:49

## 2021-09-01 RX ADMIN — HYDROMORPHONE HYDROCHLORIDE 1 MG: 2 INJECTION, SOLUTION INTRAMUSCULAR; INTRAVENOUS; SUBCUTANEOUS at 09:59

## 2021-09-01 RX ADMIN — HYDROMORPHONE HYDROCHLORIDE 1 MG: 2 INJECTION, SOLUTION INTRAMUSCULAR; INTRAVENOUS; SUBCUTANEOUS at 16:38

## 2021-09-01 RX ADMIN — OXYCODONE 10 MG: 5 TABLET ORAL at 01:31

## 2021-09-01 RX ADMIN — SODIUM CHLORIDE, PRESERVATIVE FREE 10 ML: 5 INJECTION INTRAVENOUS at 07:39

## 2021-09-01 RX ADMIN — OXYCODONE HYDROCHLORIDE 20 MG: 15 TABLET ORAL at 14:51

## 2021-09-01 RX ADMIN — FOLIC ACID 2 MG: 1 TABLET ORAL at 07:38

## 2021-09-01 RX ADMIN — OXYCODONE HYDROCHLORIDE 20 MG: 15 TABLET ORAL at 05:26

## 2021-09-01 RX ADMIN — HYDROMORPHONE HYDROCHLORIDE 1 MG: 2 INJECTION, SOLUTION INTRAMUSCULAR; INTRAVENOUS; SUBCUTANEOUS at 00:26

## 2021-09-01 RX ADMIN — HYDROMORPHONE HYDROCHLORIDE 1 MG: 2 INJECTION, SOLUTION INTRAMUSCULAR; INTRAVENOUS; SUBCUTANEOUS at 03:33

## 2021-09-01 RX ADMIN — OXYCODONE HYDROCHLORIDE 20 MG: 10 TABLET, FILM COATED, EXTENDED RELEASE ORAL at 21:23

## 2021-09-01 ASSESSMENT — PAIN SCALES - GENERAL
PAINLEVEL_OUTOF10: 8
PAINLEVEL_OUTOF10: 8
PAINLEVEL_OUTOF10: 9
PAINLEVEL_OUTOF10: 8
PAINLEVEL_OUTOF10: 7
PAINLEVEL_OUTOF10: 8
PAINLEVEL_OUTOF10: 9
PAINLEVEL_OUTOF10: 8
PAINLEVEL_OUTOF10: 10
PAINLEVEL_OUTOF10: 8
PAINLEVEL_OUTOF10: 8
PAINLEVEL_OUTOF10: 9

## 2021-09-01 NOTE — PROGRESS NOTES
Hospitalist Progress Note      PCP: Jordyn Springer MD    Date of Admission: 8/30/2021    Chief Complaint: Sickle cell pain crisis    Hospital Course: Reviewed H&P    Subjective: Patient seen and examined this morning. Currently resting in bed. Reviewed hematology plan for [facility] placement and Red cell exchange but patient refused . S/p 1 unit PRBC transfusion yesterday . D/w RN -no  Acute events reported . Hematology managing Sickle Cell Crisis Pain Mx     Medications:  Reviewed    Infusion Medications    sodium chloride      sodium chloride       Scheduled Medications    [START ON 9/2/2021] hydroxyurea  1,000 mg Oral Daily    oxyCODONE  20 mg Oral 2 times per day    sodium chloride flush  5-40 mL IntraVENous 2 times per day    enoxaparin  40 mg SubCUTAneous Daily    folic acid  2 mg Oral Daily     PRN Meds: oxyCODONE HCl, albuterol sulfate HFA, levalbuterol, sodium chloride flush, sodium chloride, ondansetron **OR** ondansetron, polyethylene glycol, acetaminophen **OR** acetaminophen, sodium chloride, HYDROmorphone      Intake/Output Summary (Last 24 hours) at 9/1/2021 1345  Last data filed at 9/1/2021 0528  Gross per 24 hour   Intake 1323.17 ml   Output 1925 ml   Net -601.83 ml       Physical Exam Performed:  BP (!) 93/55   Pulse 75   Temp 99.2 °F (37.3 °C) (Oral)   Resp 16   Ht 5' 8\" (1.727 m)   Wt 187 lb 14.4 oz (85.2 kg)   SpO2 97%   BMI 28.57 kg/m²     General appearance: No apparent distress, appears stated age and cooperative. HEENT: Pupils equal, round, and reactive to light. Conjunctivae/corneas clear. Neck: Supple, with full range of motion. No jugular venous distention. Trachea midline. Respiratory:  Normal respiratory effort. Clear to auscultation, bilaterally without Rales/Wheezes/Rhonchi. Cardiovascular: Regular rate and rhythm with normal S1/S2 without murmurs, rubs or gallops. Abdomen: Soft, non-tender, non-distended with normal bowel sounds.   Musculoskeletal: No clubbing, cyanosis or edema bilaterally. Full range of motion without deformity. Skin: Skin color, texture, turgor normal.  No rashes or lesions. Neurologic:  Neurovascularly intact without any focal sensory/motor deficits. Cranial nerves: II-XII intact, grossly non-focal.  Psychiatric: Alert and oriented, thought content appropriate, normal insight  Capillary Refill: Brisk,< 3 seconds   Peripheral Pulses: +2 palpable, equal bilaterally       Labs:   Recent Labs     08/30/21 2157 08/31/21 0547 09/01/21  1046   WBC 19.2* 18.4* 11.2*   HGB 9.7* 7.5* 9.9*   HCT 28.6* 22.0* 28.4*    175 255     Recent Labs     08/30/21 2157 08/31/21 0547 09/01/21  1046    140 139   K 4.3 4.1 4.4    108 104   CO2 24 23 25   BUN 11 10 9   CREATININE 0.8* 0.7* 0.7*   CALCIUM 10.0 9.4 9.8   PHOS  --  4.5  --      Recent Labs     08/30/21 2157 09/01/21  1046   AST 38* 24   ALT 36 26   BILITOT 4.5* 5.1*   ALKPHOS 190* 157*     Recent Labs     08/31/21  0900   INR 1.22*     Radiology:  No orders to display     Active Hospital Problems    Diagnosis Date Noted    Sickle cell pain crisis ([facility] [address] [D57.00] 07/25/2020     Assessment/Plan:  Sickle cell pain crisis  - received IV fluids, multiple doses of dilaudid ; toradol ; oxycodone given in ED  -continue hydrea  -MIVF  -prn pain medication  -hem/onc consulted -given his prolonged crisis duration planned  for red cell exchange after tunneled dialysis catheter placement but patient refused . S/p 1 unit PRBC transfusion on 8/31/21      Leukocytosis, 19.2 on admission  -likely 2/2 stress response  -cbc this a.m. improving. Asthma, stable  -does not appear to be in acute exacerbation at this time  -continue home inhalers and nebulizers     Chronic macrocytic anemia, 9.7/28.6 on admission  -no s/s of bleeding at this time ; hemoglobin down to 7.5 this morning. Hematology plan for 4 units of red cell exchange today.   -Monitor CBC.     DVT Prophylaxis: Lovenox   Diet: ADULT DIET; Regular  Code Status: Full Code    PT/OT Eval Status: Ambulatory     Dispo - Per Hem/onc        The note was completed using Dragon -speech recognition software & EMR  . Every effort was made to ensure accuracy; however, inadvertent computerized transcription errors may be present.     Severa Filippo, MD

## 2021-09-01 NOTE — CARE COORDINATION
Chart reviewed. Spoke with Clinton Montes NP. Anticipate d/c tomorrow.  Patient in contact with pain management MD. Michelle Mccarthy RN

## 2021-09-01 NOTE — PROGRESS NOTES
Pt assessment completed and charted. VSS. Pt a/o x4. Prn medication given per mar for pain. Bed in lowest position and wheels locked. Call light within reach. Bedside table within reach. Non-skid footwear in place. Pt denies any other needs at this time. Pt calls out appropriately. Will continue to monitor.

## 2021-09-01 NOTE — PLAN OF CARE
Problem: Pain:  Goal: Pain level will decrease  Description: Pain level will decrease  Outcome: Ongoing  Goal: Control of acute pain  Description: Control of acute pain  Outcome: Ongoing  Note: Pt continues to rate pain at 8/10, but states that prn medication does bring pain to a tolerable level for some time. Encouraging to try PO pain medication first. Will continue to monitor.

## 2021-09-01 NOTE — PROGRESS NOTES
Perfect serve message sent to Heladio Galeas NP we able to get the Roxicodone switched to 20 mg? pt states that he had this during last admission. thanks!\", awaiting response.     See new orders

## 2021-09-01 NOTE — PROGRESS NOTES
Pt A&Ox4. VSS. Shift assessment completed. Pt is still in a lot of pain- pain medication administered per orders. Call light within reach, bed in lowest position, wheels locked.

## 2021-09-02 VITALS
BODY MASS INDEX: 28.48 KG/M2 | RESPIRATION RATE: 16 BRPM | DIASTOLIC BLOOD PRESSURE: 78 MMHG | HEART RATE: 80 BPM | HEIGHT: 68 IN | OXYGEN SATURATION: 97 % | WEIGHT: 187.9 LBS | SYSTOLIC BLOOD PRESSURE: 128 MMHG | TEMPERATURE: 98.5 F

## 2021-09-02 PROCEDURE — 6360000002 HC RX W HCPCS: Performed by: NURSE PRACTITIONER

## 2021-09-02 PROCEDURE — G0378 HOSPITAL OBSERVATION PER HR: HCPCS

## 2021-09-02 PROCEDURE — 6370000000 HC RX 637 (ALT 250 FOR IP): Performed by: NURSE PRACTITIONER

## 2021-09-02 PROCEDURE — 96376 TX/PRO/DX INJ SAME DRUG ADON: CPT

## 2021-09-02 PROCEDURE — 2580000003 HC RX 258: Performed by: NURSE PRACTITIONER

## 2021-09-02 RX ADMIN — FOLIC ACID 2 MG: 1 TABLET ORAL at 08:23

## 2021-09-02 RX ADMIN — OXYCODONE HYDROCHLORIDE 20 MG: 15 TABLET ORAL at 04:43

## 2021-09-02 RX ADMIN — SODIUM CHLORIDE, PRESERVATIVE FREE 10 ML: 5 INJECTION INTRAVENOUS at 09:00

## 2021-09-02 RX ADMIN — HYDROMORPHONE HYDROCHLORIDE 1 MG: 2 INJECTION, SOLUTION INTRAMUSCULAR; INTRAVENOUS; SUBCUTANEOUS at 09:27

## 2021-09-02 RX ADMIN — HYDROMORPHONE HYDROCHLORIDE 1 MG: 2 INJECTION, SOLUTION INTRAMUSCULAR; INTRAVENOUS; SUBCUTANEOUS at 05:44

## 2021-09-02 RX ADMIN — OXYCODONE HYDROCHLORIDE 20 MG: 15 TABLET ORAL at 11:06

## 2021-09-02 RX ADMIN — OXYCODONE HYDROCHLORIDE 20 MG: 10 TABLET, FILM COATED, EXTENDED RELEASE ORAL at 08:23

## 2021-09-02 RX ADMIN — HYDROXYUREA 1000 MG: 500 CAPSULE ORAL at 08:23

## 2021-09-02 RX ADMIN — HYDROMORPHONE HYDROCHLORIDE 1 MG: 2 INJECTION, SOLUTION INTRAMUSCULAR; INTRAVENOUS; SUBCUTANEOUS at 02:11

## 2021-09-02 RX ADMIN — OXYCODONE HYDROCHLORIDE 20 MG: 15 TABLET ORAL at 00:49

## 2021-09-02 RX ADMIN — HYDROMORPHONE HYDROCHLORIDE 1 MG: 2 INJECTION, SOLUTION INTRAMUSCULAR; INTRAVENOUS; SUBCUTANEOUS at 12:30

## 2021-09-02 NOTE — PROGRESS NOTES
PS Dr. Raysa Obrien again per pt request, \"Sorry to message you again. Pt asked that I message you and call [facility] to tell them he can pick his pain medication up. Thank you. \"

## 2021-09-02 NOTE — PROGRESS NOTES
ONCOLOGY HEMATOLOGY CARE PROGRESS NOTE      SUBJECTIVE:       The patient states he feels pretty well and wants to go home. ROS:     Constitutional:  No weight loss, No fever, No chills, No night sweats. Energy level good. Eyes:  No impairment or change in vision  ENT / Mouth:  No pain, abnormal ulceration, bleeding, nasal drip or change in voice or hearing  Cardiovascular:  No chest pain, palpitations, new edema, or calf discomfort  Respiratory:  No pain, hemoptysis, change to breathing  Breast:  No pain, discharge, change in appearance or texture  Gastrointestinal:  No pain, cramping, jaundice, change to eating and bowel habits  Urinary:  No pain, bleeding or change in continence  Genitalia: No pain, bleeding or discharge  Musculoskeletal:  No redness, pain, edema or weakness  Skin:  No pruritus, rash, change to nodules or lesions  Neurologic:  No discomfort, change in mental status, speech, sensory or motor activity  Psychiatric:  No change in concentration or change to affect or mood  Endocrine:  No hot flashes, increased thirst, or change to urine production  Hematologic: No petechiae, ecchymosis or bleeding  Lymphatic:  No lymphadenopathy or lymphedema  Allergy / Immunologic:  No eczema, hives, frequent or recurrent infections    OBJECTIVE        Physical    VITALS:  /78   Pulse 80   Temp 98.5 °F (36.9 °C) (Oral)   Resp 16   Ht 5' 8\" (1.727 m)   Wt 187 lb 14.4 oz (85.2 kg)   SpO2 97%   BMI 28.57 kg/m²   TEMPERATURE:  Current - Temp: 98.5 °F (36.9 °C);  Max - Temp  Av.5 °F (36.9 °C)  Min: 98.3 °F (36.8 °C)  Max: 98.7 °F (37.1 °C)  PULSE OXIMETRY RANGE: SpO2  Av.3 %  Min: 97 %  Max: 98 %  24HR INTAKE/OUTPUT:      Intake/Output Summary (Last 24 hours) at 2021 0921  Last data filed at 2021 2971  Gross per 24 hour   Intake 730 ml   Output 600 ml   Net 130 ml       CONSTITUTIONAL: awake, alert, cooperative, no apparent distress   EYES: pupils equal, round and reactive to light, sclera clear and conjunctiva normal  ENT: Normocephalic, without obvious abnormality, atraumatic  NECK: supple, symmetrical, no jugular venous distension and no carotid bruits   HEMATOLOGIC/LYMPHATIC: no cervical, supraclavicular or axillary lymphadenopathy   LUNGS: no increased work of breathing and clear to auscultation   CARDIOVASCULAR: regular rate and rhythm, normal S1 and S2, no murmur noted  ABDOMEN: normal bowel sounds x 4, soft, non-distended, non-tender, no masses palpated, no hepatosplenomgaly   MUSCULOSKELETAL: full range of motion noted, tone is normal  NEUROLOGIC: awake, alert, oriented to name, place and time. Motor skills grossly intact. SKIN: Normal skin color, texture, turgor and no jaundice.  appears intact   EXTREMITIES: no LE edema       Data      Recent Labs     08/30/21 2157 08/31/21  0547 09/01/21  1046   WBC 19.2* 18.4* 11.2*   HGB 9.7* 7.5* 9.9*   HCT 28.6* 22.0* 28.4*    175 255   .0* 106.5* 99.3        Recent Labs     08/30/21 2157 08/31/21  0547 09/01/21  1046    140 139   K 4.3 4.1 4.4    108 104   CO2 24 23 25   PHOS  --  4.5  --    BUN 11 10 9   CREATININE 0.8* 0.7* 0.7*     Recent Labs     08/30/21 2157 09/01/21  1046   AST 38* 24   ALT 36 26   BILITOT 4.5* 5.1*   ALKPHOS 190* 157*       Magnesium:    Lab Results   Component Value Date    MG 1.80 05/05/2021    MG 1.80 05/04/2021    MG 1.90 01/12/2021         Problem List  Patient Active Problem List   Diagnosis    Sickle cell pain crisis ([address]    Asthma    [facility]    Sepsis [facility]    Opiate overdose ([facility] [facility]    Opiate antagonist poisoning, accidental or unintentional, initial encounter    Leukocytosis    Hypoxia    Anemia    Other chest pain    Pneumonia due to infectious organism    Fever    Chronic prescription opiate use       ASSESSMENT AND PLAN:      Sickle cell crisis:  -The patient is following in a bad pattern  -He takes his pain medication too quickly and then comes to the hospital for a crisis for 48 hours and leaves when he is due for more pain medication.  -He gets his pain medication weekly, because he is a responsible and has had an unintentional overdose.  -We tried to get him into a pain management clinic and they will not take him.  -We will look for 1 more, and I have told him that he will need to find another source of narcotics if he cannot be more responsible.  -He is also refusing exchange transfusion yet has had frequent crisis.   Hydrea 1000 mg daily   Folate 2 mg daily           ONCOLOGIC DISPOSITION:   -Okay for discharge    Khurram James MD  May be reached through [employer]        Please contact through [address]

## 2021-09-02 NOTE — PROGRESS NOTES
Pt a/o. VSS. Shift assessment complete and charted. Pt c/o pain- admin prn analgesic as ordered and requested. Pt appears comfortable and not in distress laying in bed watching phone. Pt is very concerned about getting his pain medication prescription prior to discharge and has called this RN multiple times asking me to message Dr. Carolina Thibodeaux. Pt still taking IV pain medication as soon as it is available, as well as PO pain medication. Pt stable and denied needs when writer left room.

## 2021-09-02 NOTE — PROGRESS NOTES
PS Dr. Wily Mustafa, \"Saw your note Yasmin Sommer for discharge\" but you're the attending on the case so I will need a discharge order. Also, pt is asking if you can call in pain medication downstairs for him. Thank you. \"

## 2021-09-02 NOTE — PROGRESS NOTES
Shift assessment as charted. A/O x 4. VSS. Pt sitting on bed on his cell phone without outward signs of pain. States pain is 7/10 in knees, legs and shoulders. Scheduled oxycodone extended release administered as ordered. Tolerating PO intake. Lungs CTA in all lobes. Nonskid footwear on. Bed in low position, wheels locked, SR x 2, call light and bedside table within reach. Will monitor.       Electronically signed by Remberto Rajput RN on 9/1/2021 at 10:11 PM

## 2021-09-02 NOTE — DISCHARGE SUMMARY
Hospital Discharge Summary    Patient's PCP:  Aly Zee MD  Admit Date: 8/30/2021   Discharge Date: 9/2/2021    Admitting Physician:  Tatiana Davies DO  Discharge Physician:  Citlaly Xiao, *   Consults: none      Discharge Diagnoses:   Patient Active Problem List   Diagnosis Code    Sickle cell pain crisis ([facility] D57.00    Asthma J45.909    Sickle cell crisis ([facility] D57.00    Sepsis ([facility] A41.9    Opiate overdose ([facility] T40.601A    Opiate antagonist poisoning, accidental or unintentional, initial encounter T50.7X1A    Leukocytosis D72.829    Hypoxia R09.02    Anemia D64.9    Other chest pain R07.89    Pneumonia due to infectious organism J18.9    Fever R50.9    Chronic prescription opiate use Z79.891       Physical Exam: /78   Pulse 80   Temp 98.5 °F (36.9 °C) (Oral)   Resp 16   Ht 5' 8\" (1.727 m)   Wt 187 lb 14.4 oz (85.2 kg)   SpO2 97%   BMI 28.57 kg/m²     No results for input(s): POCGLU in the last 72 hours. CONSTITUTIONAL: awake, alert, cooperative, no apparent distress   EYES: pupils equal, round and reactive to light, sclera clear and conjunctiva normal  ENT: Normocephalic, without obvious abnormality, atraumatic  NECK: supple, symmetrical, no jugular venous distension and no carotid bruits   HEMATOLOGIC/LYMPHATIC: no cervical, supraclavicular or axillary lymphadenopathy   LUNGS: no increased work of breathing and clear to auscultation   CARDIOVASCULAR: regular rate and rhythm, normal S1 and S2, no murmur noted  ABDOMEN: normal bowel sounds x 4, soft, non-distended, non-tender, no masses palpated, no hepatosplenomegaly   MUSCULOSKELETAL: full range of motion noted, tone is normal  NEUROLOGIC: awake, alert, oriented to name, place and time. Motor skills grossly intact. SKIN: Normal skin color, texture, turgor and no jaundice.  appears intact   EXTREMITIES: no LE edema     LABS:  Recent Labs     08/30/21  2157 08/31/21  0547 09/01/21  1046   WBC 19.2* 18.4* 11.2*   HGB 9.7* 7.5* 9.9*   HCT 28.6* 22.0* 28.4*    175 255                                                                  Recent Labs     08/30/21  2157 08/31/21  0547 09/01/21  1046    140 139   K 4.3 4.1 4.4    108 104   CO2 24 23 25   BUN 11 10 9   CREATININE 0.8* 0.7* 0.7*   GLUCOSE 101* 123* 91     Recent Labs     08/31/21  0900   INR 1.22*       Treatments: IV hydration    Discharge Medications:   Kendell [address] Medication Instructions [id number]    Printed on:09/02/21 1127   Medication Information                      albuterol sulfate HFA (PROAIR HFA) 108 (90 Base) MCG/ACT inhaler  Albuterol HFA Inhaler 90 mcg/actuation  inhaled  2 puffs prn     Active             docusate (COLACE, DULCOLAX) 100 MG CAPS  Take 100 mg by mouth             hydroxyurea (HYDREA) 500 MG chemo capsule  Take 2 capsules by mouth 2 times daily             ibuprofen (ADVIL;MOTRIN) 800 MG tablet  ibuprofen 800 MG Oral Tablet  oral   prn    Active             levalbuterol (XOPENEX) 0.31 MG/3ML nebulization  Levalbuterol Nebulized    2 puffs prn     Active             oxyCODONE (OXYCONTIN) 20 MG extended release tablet  TAKE 1 TABLET BY MOUTH EVERY 12 HOURS. TAKE WITH ENOUGH WATER TO SWALLOW WHOLE. DO NOT CRUSH/DISSOLVE/CHEW/CUT/BREAK. oxyCODONE HCl (OXY-IR) 10 MG immediate release tablet  Take 10 mg by mouth every 4 hours as needed for Pain. Activity: activity as tolerated  Diet: regular diet  Wound Care: none needed    Disposition: home  Discharged Condition: Stable  Follow Up: The patient was admitted for sickle cell crisis. This was very mild. We have had a problem that he takes his pain medicine too quickly, runs out and then comes to the hospital and gets admitted. This was discussed at length with the patient. Also, we tried to get him to see a pain specialist, but the pain specialist refused because the patient had had an accidental overdose.   Furthermore, the patient refused an exchange transfusion. He was given blood. His crisis was short-lived.     He will return to the office in 1 week

## 2021-09-02 NOTE — PROGRESS NOTES
Pt called this RN requesting pain medication. RN brought prn oxycodone, pt rolling around in bed grasping legs stating \"I just can't wait, I need my IV pain medicine. I can't take that. \"  Pain management education provided, pt continued to demand IV pain medication. PRN dilaudid administered. After administering, pt immediately resting with legs crossed in bed on cell phone without signs of pain.       Electronically signed by Taya Low RN on 9/1/2021 at 11:27 PM

## 2021-09-07 ENCOUNTER — HOSPITAL ENCOUNTER (EMERGENCY)
Age: 21
Discharge: HOME | End: 2021-09-07
Attending: EMERGENCY MEDICINE
Payer: COMMERCIAL

## 2021-09-07 VITALS
DIASTOLIC BLOOD PRESSURE: 67 MMHG | HEART RATE: 57 BPM | RESPIRATION RATE: 20 BRPM | OXYGEN SATURATION: 100 % | BODY MASS INDEX: 28.04 KG/M2 | HEIGHT: 68 IN | TEMPERATURE: 98.5 F | SYSTOLIC BLOOD PRESSURE: 121 MMHG | WEIGHT: 185 LBS

## 2021-09-07 DIAGNOSIS — K08.89 TOOTHACHE: Primary | ICD-10-CM

## 2021-09-07 LAB
HEMOGLOBIN ELECTROPHORESIS: NORMAL
HGB ELECTROPHORESIS INTERP: NORMAL

## 2021-09-07 PROCEDURE — 99283 EMERGENCY DEPT VISIT LOW MDM: CPT

## 2021-09-07 PROCEDURE — 6370000000 HC RX 637 (ALT 250 FOR IP): Performed by: EMERGENCY MEDICINE

## 2021-09-07 RX ORDER — PENICILLIN V POTASSIUM 500 MG/1
500 TABLET ORAL 4 TIMES DAILY
Qty: 40 TABLET | Refills: 0 | Status: SHIPPED | OUTPATIENT
Start: 2021-09-07 | End: 2021-09-17

## 2021-09-07 RX ORDER — PENICILLIN V POTASSIUM 250 MG/1
500 TABLET ORAL ONCE
Status: COMPLETED | OUTPATIENT
Start: 2021-09-07 | End: 2021-09-07

## 2021-09-07 RX ORDER — IBUPROFEN 600 MG/1
600 TABLET ORAL ONCE
Status: COMPLETED | OUTPATIENT
Start: 2021-09-07 | End: 2021-09-07

## 2021-09-07 RX ADMIN — PENICILLIN V POTASSIUM 500 MG: 250 TABLET, FILM COATED ORAL at 13:38

## 2021-09-07 RX ADMIN — IBUPROFEN 600 MG: 600 TABLET, FILM COATED ORAL at 13:38

## 2021-09-07 ASSESSMENT — PAIN SCALES - GENERAL
PAINLEVEL_OUTOF10: 9
PAINLEVEL_OUTOF10: 9

## 2021-09-07 ASSESSMENT — PAIN DESCRIPTION - LOCATION: LOCATION: FACE;HEAD

## 2021-09-07 NOTE — ED PROVIDER NOTES
[facility]  ED  [facility]      Pt Name: Lyman Seip  MRN: [id number]  [place] 2000  Date of evaluation: 9/7/2021  Provider: Ella Gil MD    CHIEF COMPLAINT       Chief Complaint   Patient presents with    Facial Pain     pt reporting facial pain and headache that started last night.  Headache         HISTORY OF PRESENT ILLNESS   (Location/Symptom, Timing/Onset, Context/Setting, Quality, Duration, Modifying Factors, Severity)  Note limiting factors. Lyman Seip is a 24 y.o. male with past medical history of asthma, sickle cell disease on chronic narcotic pain medication here today with dental pain    Patient states for the past few days has had slowly worsening pain in his left face. States that \"started off as a toothache with a throbbing aching discomfort in the left lower jaw but now it spread and hurting his entire face. No trauma or injury. No facial swelling. No fevers or chills. Throbbing aching discomfort located in the left face radiating throughout. Trouble swallowing or change in his speech. Pain 10/10 with no alleviating factors including his home narcotics    HPI    Nursing Notes were reviewed. REVIEW OF SYSTEMS    (2-9 systems for level 4, 10 or more for level 5)     Review of Systems    Please see HPI for pertinent positive and negative review of system findings. A full 10 system ROS was performed and otherwise negative.         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)     Sickle cell anemia (HCC)          SURGICAL HISTORY       Past Surgical History:   Procedure Laterality Date    SPLENECTOMY           CURRENT MEDICATIONS       Previous Medications    ALBUTEROL SULFATE HFA (PROAIR HFA) 108 (90 BASE) MCG/ACT INHALER    Albuterol HFA Inhaler 90 mcg/actuation  inhaled  2 puffs prn     Active    DOCUSATE (COLACE, DULCOLAX) 100 MG CAPS    Take 100 mg by mouth    HYDROXYUREA (HYDREA) 500 MG CHEMO CAPSULE    Take 2 capsules by mouth 2 times daily    IBUPROFEN (ADVIL;MOTRIN) 800 MG TABLET    ibuprofen 800 MG Oral Tablet  oral   prn    Active    LEVALBUTEROL (XOPENEX) 0.31 MG/3ML NEBULIZATION    Levalbuterol Nebulized    2 puffs prn     Active    OXYCODONE (OXYCONTIN) 20 MG EXTENDED RELEASE TABLET    TAKE 1 TABLET BY MOUTH EVERY 12 HOURS. TAKE WITH ENOUGH WATER TO SWALLOW WHOLE. DO NOT CRUSH/DISSOLVE/CHEW/CUT/BREAK. OXYCODONE HCL (OXY-IR) 10 MG IMMEDIATE RELEASE TABLET    Take 10 mg by mouth every 4 hours as needed for Pain. ALLERGIES     Morphine    FAMILY HISTORY       Family History   Problem Relation Age of Onset    Other Father         sarcoidosis          SOCIAL HISTORY       Social History     Socioeconomic History    Marital status: Single     Spouse name: None    Number of children: 0    Years of education: None    Highest education level: None   Occupational History    None   Tobacco Use    Smoking status: Never Smoker    Smokeless tobacco: Never Used   Vaping Use    Vaping Use: Never used   Substance and Sexual Activity    Alcohol use: Not Currently    Drug use: Never    Sexual activity: Not Currently   Other Topics Concern    None   Social History Narrative    None     Social Determinants of Health     Financial Resource Strain:     Difficulty of Paying Living Expenses:    Food Insecurity:     Worried About Running Out of Food in the Last Year:     Ran Out of Food in the Last Year:    Transportation Needs:     Lack of Transportation (Medical):      Lack of Transportation (Non-Medical):    Physical Activity:     Days of Exercise per Week:     Minutes of Exercise per Session:    Stress:     Feeling of Stress :    Social Connections:     Frequency of Communication with Friends and Family:     Frequency of Social Gatherings with Friends and Family:     Attends Gnosticism Services:     Active Member of Clubs or Organizations:     Attends Club or Organization Meetings:     Marital Status:    Intimate Partner Violence:     Fear of Current or Ex-Partner:     Emotionally Abused:     Physically Abused:     Sexually Abused:        SCREENINGS               PHYSICAL EXAM    (up to 7 for level 4, 8 or more for level 5)     ED Triage Vitals [09/07/21 1037]   BP Temp Temp Source Pulse Resp SpO2 Height Weight   (!) 148/69 98.5 °F (36.9 °C) Oral 92 16 96 % 5' 8\" (1.727 m) 185 lb (83.9 kg)       Physical Exam      General appearance:  Cooperative. No acute distress. Lying in bed eating chips and playing on his phone  Skin:  Warm. Dry. Ears, nose, mouth and throat:  Oral mucosa moist, poor dentition with significant dental caries and heavily diseased tooth #17. No surrounding erythema. No swelling. Tongue and uvular midline. Floor of mouth soft. No trismus or malocclusion  Perfusion:  intact  Respiratory: Respirations nonlabored. Neurological:  Alert. Moves all extremities spontaneously  Musculoskeletal:   Normal ROM, no deformities          Psychiatric:  Normal mood      DIAGNOSTIC RESULTS       Labs Reviewed - No data to display    Interpretation per the Radiologist below, if obtained/available at the time of this note:    No orders to display       All other labs/imaging were within normal range or not returned as of this dictation. EMERGENCY DEPARTMENT COURSE and DIFFERENTIAL DIAGNOSIS/MDM:   Vitals:    Vitals:    09/07/21 1037 09/07/21 1318   BP: (!) 148/69 121/67   Pulse: 92 57   Resp: 16 20   Temp: 98.5 °F (36.9 °C)    TempSrc: Oral    SpO2: 96% 100%   Weight: 185 lb (83.9 kg)    Height: 5' 8\" (1.727 m)        Patient presents emergency department today complaining of left lower jaw pain radiating throughout his face. Suspect early dental infection. Heavily diseased tooth #17. No large abscess. Tolerating his secretions. Does not appear systemically ill.  Will be given penicillin otherwise feel he may take Motrin for his pain or his chronic long-term outpatient narcotics. Will not write any new pain medication prescriptions    MDM    CONSULTS     None    Critical Care:   None    REASSESSMENT          PROCEDURE     Unless otherwise noted below, none     Procedures      FINAL IMPRESSION      1. Toothache            DISPOSITION/PLAN   DISPOSITION Decision To Discharge 09/07/2021 01:24:44 PM        PATIENT REFERRED TO:  Juvenal Samano MD  [address]  [phone]    Schedule an appointment as soon as possible for a visit       Dental Clinic  Please choose a dentist from the list provided for follow up  Schedule an appointment as soon as possible for a visit         DISCHARGE MEDICATIONS:  New Prescriptions    PENICILLIN V POTASSIUM (VEETID) 500 MG TABLET    Take 1 tablet by mouth 4 times daily for 10 days     Controlled Substances Monitoring:     No flowsheet data found.     (Please note that portions of this note were completed with a voice recognition program.  Efforts were made to edit the dictations but occasionally words are mis-transcribed.)    Vandana Roth MD (electronically signed)  Attending Emergency Physician            Elizabeth Zayas MD  09/07/21 3677

## 2021-09-07 NOTE — ED NOTES
Pt requesting to talk to Dr. Marvel Cordero. Dr. Marvel Cordero advised.        Joyceann Aase, RN  09/07/21 6159

## 2021-09-13 ENCOUNTER — HOSPITAL ENCOUNTER (INPATIENT)
Age: 21
LOS: 2 days | Discharge: HOME | DRG: 812 | End: 2021-09-15
Attending: EMERGENCY MEDICINE | Admitting: INTERNAL MEDICINE
Payer: COMMERCIAL

## 2021-09-13 DIAGNOSIS — D57.00 SICKLE-CELL DISEASE WITH PAIN (HCC): Primary | ICD-10-CM

## 2021-09-13 LAB
A/G RATIO: 1.5 (ref 1.1–2.2)
ALBUMIN SERPL-MCNC: 4.3 G/DL (ref 3.4–5)
ALP BLD-CCNC: 119 U/L (ref 40–129)
ALT SERPL-CCNC: 16 U/L (ref 10–40)
ANION GAP SERPL CALCULATED.3IONS-SCNC: 11 MMOL/L (ref 3–16)
ANISOCYTOSIS: ABNORMAL
AST SERPL-CCNC: 28 U/L (ref 15–37)
BASOPHILIC STIPPLING: ABNORMAL
BASOPHILS ABSOLUTE: 0.2 K/UL (ref 0–0.2)
BASOPHILS RELATIVE PERCENT: 1.4 %
BILIRUB SERPL-MCNC: 5.1 MG/DL (ref 0–1)
BUN BLDV-MCNC: 10 MG/DL (ref 7–20)
CALCIUM SERPL-MCNC: 9.2 MG/DL (ref 8.3–10.6)
CHLORIDE BLD-SCNC: 106 MMOL/L (ref 99–110)
CO2: 23 MMOL/L (ref 21–32)
CREAT SERPL-MCNC: 0.6 MG/DL (ref 0.9–1.3)
EOSINOPHILS ABSOLUTE: 0.2 K/UL (ref 0–0.6)
EOSINOPHILS RELATIVE PERCENT: 1.1 %
GFR AFRICAN AMERICAN: >60
GFR NON-AFRICAN AMERICAN: >60
GLOBULIN: 2.8 G/DL
GLUCOSE BLD-MCNC: 101 MG/DL (ref 70–99)
HCT VFR BLD CALC: 23.7 % (ref 40.5–52.5)
HEMATOLOGY PATH CONSULT: NO
HEMOGLOBIN: 8.2 G/DL (ref 13.5–17.5)
IMMATURE RETIC FRACT: 0.67 (ref 0.21–0.37)
LYMPHOCYTES ABSOLUTE: 2.9 K/UL (ref 1–5.1)
LYMPHOCYTES RELATIVE PERCENT: 16.7 %
MACROCYTES: ABNORMAL
MAGNESIUM: 2 MG/DL (ref 1.8–2.4)
MCH RBC QN AUTO: 34.3 PG (ref 26–34)
MCHC RBC AUTO-ENTMCNC: 34.8 G/DL (ref 31–36)
MCV RBC AUTO: 98.6 FL (ref 80–100)
MICROCYTES: ABNORMAL
MONOCYTES ABSOLUTE: 1.8 K/UL (ref 0–1.3)
MONOCYTES RELATIVE PERCENT: 10.6 %
NEUTROPHILS ABSOLUTE: 12 K/UL (ref 1.7–7.7)
NEUTROPHILS RELATIVE PERCENT: 70.2 %
OVALOCYTES: ABNORMAL
PDW BLD-RTO: 24.1 % (ref 12.4–15.4)
PLATELET # BLD: 508 K/UL (ref 135–450)
PLATELET SLIDE REVIEW: ADEQUATE
PMV BLD AUTO: 8.2 FL (ref 5–10.5)
POIKILOCYTES: ABNORMAL
POLYCHROMASIA: ABNORMAL
POTASSIUM REFLEX MAGNESIUM: 3.5 MMOL/L (ref 3.5–5.1)
RBC # BLD: 2.4 M/UL (ref 4.2–5.9)
RETICULOCYTE ABSOLUTE COUNT: 0.33 M/UL
RETICULOCYTE COUNT PCT: 13.73 % (ref 0.5–2.18)
SICKLE CELLS: ABNORMAL
SLIDE REVIEW: ABNORMAL
SODIUM BLD-SCNC: 140 MMOL/L (ref 136–145)
TARGET CELLS: ABNORMAL
TOTAL PROTEIN: 7.1 G/DL (ref 6.4–8.2)
WBC # BLD: 17.2 K/UL (ref 4–11)

## 2021-09-13 PROCEDURE — 85045 AUTOMATED RETICULOCYTE COUNT: CPT

## 2021-09-13 PROCEDURE — 99284 EMERGENCY DEPT VISIT MOD MDM: CPT

## 2021-09-13 PROCEDURE — 6360000002 HC RX W HCPCS: Performed by: INTERNAL MEDICINE

## 2021-09-13 PROCEDURE — 83735 ASSAY OF MAGNESIUM: CPT

## 2021-09-13 PROCEDURE — 83020 HEMOGLOBIN ELECTROPHORESIS: CPT

## 2021-09-13 PROCEDURE — 6370000000 HC RX 637 (ALT 250 FOR IP): Performed by: EMERGENCY MEDICINE

## 2021-09-13 PROCEDURE — 2580000003 HC RX 258: Performed by: INTERNAL MEDICINE

## 2021-09-13 PROCEDURE — 80053 COMPREHEN METABOLIC PANEL: CPT

## 2021-09-13 PROCEDURE — 6370000000 HC RX 637 (ALT 250 FOR IP): Performed by: INTERNAL MEDICINE

## 2021-09-13 PROCEDURE — 1200000000 HC SEMI PRIVATE

## 2021-09-13 PROCEDURE — 85025 COMPLETE CBC W/AUTO DIFF WBC: CPT

## 2021-09-13 PROCEDURE — 6360000002 HC RX W HCPCS: Performed by: EMERGENCY MEDICINE

## 2021-09-13 PROCEDURE — 96372 THER/PROPH/DIAG INJ SC/IM: CPT

## 2021-09-13 PROCEDURE — 87040 BLOOD CULTURE FOR BACTERIA: CPT

## 2021-09-13 PROCEDURE — 36415 COLL VENOUS BLD VENIPUNCTURE: CPT

## 2021-09-13 RX ORDER — ACETAMINOPHEN 325 MG/1
650 TABLET ORAL EVERY 6 HOURS PRN
Status: DISCONTINUED | OUTPATIENT
Start: 2021-09-13 | End: 2021-09-15 | Stop reason: HOSPADM

## 2021-09-13 RX ORDER — SODIUM CHLORIDE, SODIUM LACTATE, POTASSIUM CHLORIDE, AND CALCIUM CHLORIDE .6; .31; .03; .02 G/100ML; G/100ML; G/100ML; G/100ML
1000 INJECTION, SOLUTION INTRAVENOUS ONCE
Status: COMPLETED | OUTPATIENT
Start: 2021-09-13 | End: 2021-09-13

## 2021-09-13 RX ORDER — SODIUM CHLORIDE 0.9 % (FLUSH) 0.9 %
5-40 SYRINGE (ML) INJECTION EVERY 12 HOURS SCHEDULED
Status: DISCONTINUED | OUTPATIENT
Start: 2021-09-13 | End: 2021-09-15 | Stop reason: HOSPADM

## 2021-09-13 RX ORDER — HYDROMORPHONE HCL 110MG/55ML
1 PATIENT CONTROLLED ANALGESIA SYRINGE INTRAVENOUS EVERY 4 HOURS PRN
Status: DISPENSED | OUTPATIENT
Start: 2021-09-13 | End: 2021-09-15

## 2021-09-13 RX ORDER — SODIUM CHLORIDE 0.9 % (FLUSH) 0.9 %
5-40 SYRINGE (ML) INJECTION PRN
Status: DISCONTINUED | OUTPATIENT
Start: 2021-09-13 | End: 2021-09-15 | Stop reason: HOSPADM

## 2021-09-13 RX ORDER — PROMETHAZINE HYDROCHLORIDE 25 MG/ML
25 INJECTION, SOLUTION INTRAMUSCULAR; INTRAVENOUS ONCE
Status: DISCONTINUED | OUTPATIENT
Start: 2021-09-13 | End: 2021-09-13 | Stop reason: ALTCHOICE

## 2021-09-13 RX ORDER — DOCUSATE SODIUM 100 MG/1
100 CAPSULE, LIQUID FILLED ORAL DAILY
Status: DISCONTINUED | OUTPATIENT
Start: 2021-09-13 | End: 2021-09-15 | Stop reason: HOSPADM

## 2021-09-13 RX ORDER — HYDROMORPHONE HCL 110MG/55ML
2 PATIENT CONTROLLED ANALGESIA SYRINGE INTRAVENOUS ONCE
Status: COMPLETED | OUTPATIENT
Start: 2021-09-13 | End: 2021-09-13

## 2021-09-13 RX ORDER — LEVALBUTEROL 1.25 MG/.5ML
0.31 SOLUTION, CONCENTRATE RESPIRATORY (INHALATION) EVERY 4 HOURS PRN
Status: DISCONTINUED | OUTPATIENT
Start: 2021-09-13 | End: 2021-09-15 | Stop reason: HOSPADM

## 2021-09-13 RX ORDER — SODIUM CHLORIDE 9 MG/ML
INJECTION, SOLUTION INTRAVENOUS CONTINUOUS
Status: DISCONTINUED | OUTPATIENT
Start: 2021-09-13 | End: 2021-09-15 | Stop reason: HOSPADM

## 2021-09-13 RX ORDER — HYDROXYUREA 500 MG/1
1000 CAPSULE ORAL 2 TIMES DAILY
Status: DISCONTINUED | OUTPATIENT
Start: 2021-09-13 | End: 2021-09-15 | Stop reason: HOSPADM

## 2021-09-13 RX ORDER — POLYETHYLENE GLYCOL 3350 17 G/17G
17 POWDER, FOR SOLUTION ORAL DAILY PRN
Status: DISCONTINUED | OUTPATIENT
Start: 2021-09-13 | End: 2021-09-15 | Stop reason: HOSPADM

## 2021-09-13 RX ORDER — ALBUTEROL SULFATE 90 UG/1
2 AEROSOL, METERED RESPIRATORY (INHALATION) EVERY 4 HOURS PRN
Status: DISCONTINUED | OUTPATIENT
Start: 2021-09-13 | End: 2021-09-15 | Stop reason: HOSPADM

## 2021-09-13 RX ORDER — ONDANSETRON 4 MG/1
4 TABLET, ORALLY DISINTEGRATING ORAL ONCE
Status: COMPLETED | OUTPATIENT
Start: 2021-09-13 | End: 2021-09-13

## 2021-09-13 RX ORDER — KETOROLAC TROMETHAMINE 30 MG/ML
30 INJECTION, SOLUTION INTRAMUSCULAR; INTRAVENOUS EVERY 6 HOURS PRN
Status: DISCONTINUED | OUTPATIENT
Start: 2021-09-13 | End: 2021-09-15 | Stop reason: HOSPADM

## 2021-09-13 RX ORDER — OXYCODONE HCL 20 MG/1
20 TABLET, FILM COATED, EXTENDED RELEASE ORAL EVERY 12 HOURS SCHEDULED
Status: DISCONTINUED | OUTPATIENT
Start: 2021-09-13 | End: 2021-09-15 | Stop reason: HOSPADM

## 2021-09-13 RX ORDER — ACETAMINOPHEN 650 MG/1
650 SUPPOSITORY RECTAL EVERY 6 HOURS PRN
Status: DISCONTINUED | OUTPATIENT
Start: 2021-09-13 | End: 2021-09-15 | Stop reason: HOSPADM

## 2021-09-13 RX ORDER — OXYCODONE HYDROCHLORIDE 5 MG/1
10 TABLET ORAL EVERY 4 HOURS PRN
Status: DISCONTINUED | OUTPATIENT
Start: 2021-09-13 | End: 2021-09-15 | Stop reason: HOSPADM

## 2021-09-13 RX ORDER — ONDANSETRON 4 MG/1
4 TABLET, ORALLY DISINTEGRATING ORAL EVERY 8 HOURS PRN
Status: DISCONTINUED | OUTPATIENT
Start: 2021-09-13 | End: 2021-09-15 | Stop reason: HOSPADM

## 2021-09-13 RX ORDER — ONDANSETRON 2 MG/ML
4 INJECTION INTRAMUSCULAR; INTRAVENOUS EVERY 6 HOURS PRN
Status: DISCONTINUED | OUTPATIENT
Start: 2021-09-13 | End: 2021-09-15 | Stop reason: HOSPADM

## 2021-09-13 RX ORDER — PENICILLIN V POTASSIUM 250 MG/1
500 TABLET ORAL 4 TIMES DAILY
Status: DISCONTINUED | OUTPATIENT
Start: 2021-09-13 | End: 2021-09-15 | Stop reason: HOSPADM

## 2021-09-13 RX ORDER — SODIUM CHLORIDE 9 MG/ML
25 INJECTION, SOLUTION INTRAVENOUS PRN
Status: DISCONTINUED | OUTPATIENT
Start: 2021-09-13 | End: 2021-09-15 | Stop reason: HOSPADM

## 2021-09-13 RX ORDER — KETOROLAC TROMETHAMINE 30 MG/ML
60 INJECTION, SOLUTION INTRAMUSCULAR; INTRAVENOUS ONCE
Status: DISCONTINUED | OUTPATIENT
Start: 2021-09-13 | End: 2021-09-15 | Stop reason: HOSPADM

## 2021-09-13 RX ADMIN — KETOROLAC TROMETHAMINE 30 MG: 30 INJECTION, SOLUTION INTRAMUSCULAR; INTRAVENOUS at 16:16

## 2021-09-13 RX ADMIN — HYDROMORPHONE HYDROCHLORIDE 2 MG: 2 INJECTION, SOLUTION INTRAMUSCULAR; INTRAVENOUS; SUBCUTANEOUS at 08:31

## 2021-09-13 RX ADMIN — OXYCODONE 10 MG: 5 TABLET ORAL at 20:40

## 2021-09-13 RX ADMIN — Medication 10 ML: at 20:40

## 2021-09-13 RX ADMIN — HYDROMORPHONE HYDROCHLORIDE 1 MG: 2 INJECTION, SOLUTION INTRAMUSCULAR; INTRAVENOUS; SUBCUTANEOUS at 18:42

## 2021-09-13 RX ADMIN — OXYCODONE HYDROCHLORIDE 20 MG: 20 TABLET, FILM COATED, EXTENDED RELEASE ORAL at 20:40

## 2021-09-13 RX ADMIN — OXYCODONE 10 MG: 5 TABLET ORAL at 16:16

## 2021-09-13 RX ADMIN — ONDANSETRON 4 MG: 4 TABLET, ORALLY DISINTEGRATING ORAL at 08:31

## 2021-09-13 RX ADMIN — SODIUM CHLORIDE, POTASSIUM CHLORIDE, SODIUM LACTATE AND CALCIUM CHLORIDE 1000 ML: 600; 310; 30; 20 INJECTION, SOLUTION INTRAVENOUS at 14:43

## 2021-09-13 RX ADMIN — SODIUM CHLORIDE: 9 INJECTION, SOLUTION INTRAVENOUS at 18:42

## 2021-09-13 RX ADMIN — HYDROMORPHONE HYDROCHLORIDE 1 MG: 2 INJECTION, SOLUTION INTRAMUSCULAR; INTRAVENOUS; SUBCUTANEOUS at 23:00

## 2021-09-13 RX ADMIN — PENICILLIN V POTASSIUM 500 MG: 250 TABLET, FILM COATED ORAL at 18:43

## 2021-09-13 RX ADMIN — HYDROMORPHONE HYDROCHLORIDE 1 MG: 2 INJECTION, SOLUTION INTRAMUSCULAR; INTRAVENOUS; SUBCUTANEOUS at 14:42

## 2021-09-13 RX ADMIN — HYDROXYUREA 1000 MG: 500 CAPSULE ORAL at 21:35

## 2021-09-13 RX ADMIN — KETOROLAC TROMETHAMINE 30 MG: 30 INJECTION, SOLUTION INTRAMUSCULAR; INTRAVENOUS at 23:00

## 2021-09-13 RX ADMIN — PENICILLIN V POTASSIUM 500 MG: 250 TABLET, FILM COATED ORAL at 20:40

## 2021-09-13 RX ADMIN — HYDROMORPHONE HYDROCHLORIDE 2 MG: 2 INJECTION, SOLUTION INTRAMUSCULAR; INTRAVENOUS; SUBCUTANEOUS at 07:26

## 2021-09-13 ASSESSMENT — PAIN SCALES - GENERAL
PAINLEVEL_OUTOF10: 9
PAINLEVEL_OUTOF10: 9
PAINLEVEL_OUTOF10: 8
PAINLEVEL_OUTOF10: 9

## 2021-09-13 NOTE — H&P
Hospital Medicine History & Physical      PCP: Ritu Morley MD    Date of Admission: 9/13/2021    Date of Service: Pt seen/examined on above date and Admitted to Inpatient with expected LOS greater than two midnights due to medical therapy. Chief Complaint: Sickle cell crisis-pain in his bilateral joints, elbows and shoulders    History Of Present Illness:    24 y.o. male with history of sickle cell anemia who presented to [facility] with sickle cell pain. Patient has had multiple admissions for sickle cell crisis. Patient reports that he started to have pain in his bilateral shoulders and bilateral elbows and both knees which were similar to his prior sickle cell crisis in the past.  He is usually on 20mg  oxycontin twice a day and 10 mg of oxycodone every 4 hours as needed which he has been taking with no improvement of his pain. He was supposed to follow-up with his hematologist Dr. Marcos Mcdowell today but had so much pain and so decided to come to the ER. He was seen in the ER with concern for sickle cell crisis. His hematologist was consulted and recommended admission for further care. Patient denies any fever, chills, chest pain, sick contact, nausea, vomiting or any other complaints at this time. 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 Authorizing Provider   penicillin v potassium (VEETID) 500 MG tablet Take 1 tablet by mouth 4 times daily for 10 days 9/7/21 9/17/21  Yasmany Alexis MD   oxyCODONE HCl (OXY-IR) 10 MG immediate release tablet Take 10 mg by mouth every 4 hours as needed for Pain.     Historical Provider, MD   albuterol sulfate HFA (PROAIR HFA) 108 (90 Base) MCG/ACT inhaler Albuterol HFA Inhaler 90 mcg/actuation  inhaled  2 puffs prn     Active    Historical Provider, MD   levalbuterol (XOPENEX) 0.31 MG/3ML nebulization Levalbuterol Nebulized    2 puffs prn     Active    Historical Provider, MD   docusate (COLACE, DULCOLAX) 100 MG CAPS Take 100 mg by mouth    Historical Provider, MD   ibuprofen (ADVIL;MOTRIN) 800 MG tablet ibuprofen 800 MG Oral Tablet  oral   prn    Active    Historical Provider, MD   oxyCODONE (OXYCONTIN) 20 MG extended release tablet TAKE 1 TABLET BY MOUTH EVERY 12 HOURS. TAKE WITH ENOUGH WATER TO SWALLOW WHOLE. DO NOT CRUSH/DISSOLVE/CHEW/CUT/BREAK. 4/12/21   Historical Provider, MD   hydroxyurea (HYDREA) 500 MG chemo capsule Take 2 capsules by mouth 2 times daily 12/9/20   Kendall Vroa MD       Allergies:  Morphine    Social History:      The patient currently lives at home  TOBACCO:   reports that he has never smoked. He has never used smokeless tobacco.  ETOH:   reports previous alcohol use. E-Cigarettes/Vaping Use     Questions Responses    E-Cigarette/Vaping Use Never User    Start Date     Passive Exposure     Quit Date     Counseling Given     Comments             Family History:       Positive as follows:        Problem Relation Age of Onset    Other Father         sarcoidosis       REVIEW OF SYSTEMS COMPLETED:   Pertinent positives as noted in the HPI. All other systems reviewed and negative. PHYSICAL EXAM PERFORMED:    /71   Pulse 69   Temp 98.4 °F (36.9 °C) (Oral)   Resp 16   Ht 5' 8\" (1.727 m)   Wt 180 lb (81.6 kg)   SpO2 97%   BMI 27.37 kg/m²     General appearance:  No apparent distress, appears stated age and cooperative. HEENT:  Normal cephalic, atraumatic without obvious deformity. Pupils equal, round, and reactive to light. Extra ocular muscles intact. Icteric ( chronic per pt)   Neck: Supple, with full range of motion. No jugular venous distention. Trachea midline.   Respiratory: Normal respiratory effort. Clear to auscultation, bilaterally without Rales/Wheezes/Rhonchi. Cardiovascular:  Regular rate and rhythm with normal S1/S2 without murmurs, rubs or gallops. Abdomen: Soft, non-tender, non-distended with normal bowel sounds. Musculoskeletal:  No clubbing, cyanosis or edema bilaterally. Skin: Warm and dry  Neurologic:  Neurovascularly intact without any focal sensory/motor deficits. Cranial nerves: II-XII intact, grossly non-focal.  Psychiatric:  Alert and oriented, thought content appropriate, normal insight        Labs:     Recent Labs     09/13/21 0616   WBC 17.2*   HGB 8.2*   HCT 23.7*   *     Recent Labs     09/13/21 0616      K 3.5      CO2 23   BUN 10   CREATININE 0.6*   CALCIUM 9.2     Recent Labs     09/13/21 0616   AST 28   ALT 16   BILITOT 5.1*   ALKPHOS 119     No results for input(s): INR in the last 72 hours. No results for input(s): Phil Hug in the last 72 hours. Urinalysis:      Lab Results   Component Value Date    NITRU Negative 06/09/2021    WBCUA None seen 06/09/2021    BACTERIA Rare 06/09/2021    RBCUA 0-2 06/09/2021    BLOODU TRACE-INTACT 06/09/2021    SPECGRAV 1.010 06/09/2021    GLUCOSEU Negative 06/09/2021       Radiology:           IR MIDLINE CATH    (Results Pending)       ASSESSMENT AND PLAN    Active Hospital Problems    Diagnosis Date Noted    [facility] [D57.00] 07/28/2020       Sickle cell crisis: Recurrent. Hematology already consulted in ER-recs pending. Start supportive care with IV fluids, IV Dilaudid, IV Toradol for breakthrough pain control in addition to continuing his home dose of narcotics. Continue home dose of  hydroxyurea. Leukocytosis: Chronic on chart review. No overt signs of infection. Patient reports that he was started on veetid on 9/7/2021 for a recent tooth infection which he is to complete on 9/17/2021. Denies any tooth or jaw pain today.  Continue outpatient abx as prescribed. Check blood cultures      Hyperbilirubinemia, elevated retic count: chronic on chart review and fairly unchanged likely due to his sickle cell anemia. Supportive care as stated above. Thrombocytosis: Chronic on chart review with improvement on recent labs. plt 508 today. ?  Dehydration contributing. Monitor with IVF. Heme-onc already consulted. DVT Prophylaxis: SCDs due to anemia     Midline was ordered in ER as did not have IV access but was able to get PIV placed on transfer to medical unit. Patient  declines midline at this time. Diet: ADULT DIET; Regular  Code Status: Full Code    PT/OT Eval Status: Not indicated at this time      Dispo -admitted to inpatient. Length of stay unclear ,pending clinical course       Corry Molina MD    Thank you Chirag Gamez MD for the opportunity to be involved in this patient's care. If you have any questions or concerns please feel free to contact me at [phone].

## 2021-09-13 NOTE — PROGRESS NOTES
Pt not sure about midline placement at this time, pt will think about it and let RN know in about 1-2 hours.

## 2021-09-13 NOTE — ED PROVIDER NOTES
Emergency Physician Note        Note Open Time: 7:57 AM EDT    Chief Complaint  Sickle Cell Pain Crisis (joints, elbows and shoulder)       History of Present Illness  Gayle Grant is a 24 y.o. male who presents to the ED for sickle cell pain. Patient reports that yesterday evening he developed pain in both shoulders and both elbows and both knees. He denies any illness. No vomiting or diarrhea. No rhinorrhea, sore throat or cough. No fevers, chills or sweats. No injuries. He states the pain is moderate to severe and constant and nonradiating. He took some oxycodone before coming in but did not get much relief. 10 systems reviewed, pertinent positives per HPI otherwise noted to be negative    I have reviewed the following from the nursing documentation:      Prior to Admission medications    Medication Sig Start Date End Date Taking? Authorizing Provider   penicillin v potassium (VEETID) 500 MG tablet Take 1 tablet by mouth 4 times daily for 10 days 9/7/21 9/17/21  Mary Patiño MD   oxyCODONE HCl (OXY-IR) 10 MG immediate release tablet Take 10 mg by mouth every 4 hours as needed for Pain. Historical Provider, MD   albuterol sulfate HFA (PROAIR HFA) 108 (90 Base) MCG/ACT inhaler Albuterol HFA Inhaler 90 mcg/actuation  inhaled  2 puffs prn     Active    Historical Provider, MD   levalbuterol (XOPENEX) 0.31 MG/3ML nebulization Levalbuterol Nebulized    2 puffs prn     Active    Historical Provider, MD   docusate (COLACE, DULCOLAX) 100 MG CAPS Take 100 mg by mouth    Historical Provider, MD   ibuprofen (ADVIL;MOTRIN) 800 MG tablet ibuprofen 800 MG Oral Tablet  oral   prn    Active    Historical Provider, MD   oxyCODONE (OXYCONTIN) 20 MG extended release tablet TAKE 1 TABLET BY MOUTH EVERY 12 HOURS.  TAKE WITH ENOUGH WATER TO SWALLOW WHOLE. DO NOT CRUSH/DISSOLVE/CHEW/CUT/BREAK. 4/12/21   Historical Provider, MD   hydroxyurea (HYDREA) 500 MG chemo capsule Take 2 capsules by mouth 2 times daily 12/9/20   Kartik Walker MD       Allergies as of 09/13/2021 - Fully Reviewed 09/13/2021   Allergen Reaction Noted    Morphine Shortness Of Breath 04/05/2013       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)     Sickle cell anemia (HCC)         Surgical History:   Past Surgical History:   Procedure Laterality Date    SPLENECTOMY          Family History:    Family History   Problem Relation Age of Onset    Other Father         sarcoidosis       Social History     Socioeconomic History    Marital status: Single     Spouse name: Not on file    Number of children: 0    Years of education: Not on file    Highest education level: Not on file   Occupational History    Not on file   Tobacco Use    Smoking status: Never Smoker    Smokeless tobacco: Never Used   Vaping Use    Vaping Use: Never used   Substance and Sexual Activity    Alcohol use: Not Currently    Drug use: Never    Sexual activity: Not Currently   Other Topics Concern    Not on file   Social History Narrative    Not on file     Social Determinants of Health     Financial Resource Strain:     Difficulty of Paying Living Expenses:    Food Insecurity:     Worried About Running Out of Food in the Last Year:     Ran Out of Food in the Last Year:    Transportation Needs:     Lack of Transportation (Medical):      Lack of Transportation (Non-Medical):    Physical Activity:     Days of Exercise per Week:     Minutes of Exercise per Session:    Stress:     Feeling of Stress :    Social Connections:     Frequency of Communication with Friends and Family:     Frequency of Social Gatherings with Friends and Family:     Attends Buddhism Services:     Active Member of Clubs or Organizations:     Attends Club or Organization Meetings:     Marital Status:    Intimate Partner Violence:     Fear of Current or Ex-Partner:     Emotionally Abused:  Physically Abused:     Sexually Abused:        Nursing notes reviewed. ED Triage Vitals   Enc Vitals Group      BP 09/13/21 0106 136/75      Pulse 09/13/21 0106 83      Resp 09/13/21 0106 16      Temp 09/13/21 0106 98 °F (36.7 °C)      Temp Source 09/13/21 0106 Oral      SpO2 09/13/21 0106 95 %      Weight 09/13/21 0534 180 lb (81.6 kg)      Height 09/13/21 0534 5' 8\" (1.727 m)      Head Circumference --       Peak Flow --       Pain Score --       Pain Loc --       Pain Edu? --       Excl. in GC? --        GENERAL:  Awake, alert. Well developed, well nourished with no apparent distress. Patient is lying prone and appears comfortable. HENT:  Normocephalic, Atraumatic, moist mucous membranes. EYES:  Pupils equal round and reactive to light, Conjunctiva normal, extraocular movements normal.  NECK:  No meningeal signs, Supple. CHEST:  Regular rate and rhythm, chest wall non-tender. LUNGS:  Clear to auscultation bilaterally. ABDOMEN:  Soft, non-tender, no rebound, rigidity or guarding, non-distended, normal bowel sounds. No costovertebral angle tenderness to palpation. BACK:  No tenderness. EXTREMITIES:  Normal range of motion, no edema, no bony tenderness, no deformity, distal pulses present. SKIN: Warm, dry and intact. NEUROLOGIC: Normal mental status. Moving all extremities to command.        LABS  Labs Reviewed   CBC WITH AUTO DIFFERENTIAL - Abnormal; Notable for the following components:       Result Value    WBC 17.2 (*)     RBC 2.40 (*)     Hemoglobin 8.2 (*)     Hematocrit 23.7 (*)     MCH 34.3 (*)     RDW 24.1 (*)     Platelets 839 (*)     Neutrophils Absolute 12.0 (*)     Monocytes Absolute 1.8 (*)     Anisocytosis 2+ (*)     Macrocytes 1+ (*)     Microcytes 1+ (*)     Polychromasia Occasional (*)     Poikilocytes 2+ (*)     Ovalocytes Occasional (*)     Target Cells Occasional (*)     Basophilic Stippling Occasional (*)     Sickle Cells Occasional (*)     All other components within normal limits    Narrative:     Performed at:  [place] [address] [facility]   Phone [phone]   COMPREHENSIVE METABOLIC PANEL W/ REFLEX TO MG FOR LOW K - Abnormal; Notable for the following components:    Glucose 101 (*)     CREATININE 0.6 (*)     Total Bilirubin 5.1 (*)     All other components within normal limits    Narrative:     Performed at:  [place] [address] [facility]   Phone [phone]   RETICULOCYTES - Abnormal; Notable for the following components:    Retic Ct Pct 13.73 (*)     Immature Retic Fract 0.67 (*)     All other components within normal limits    Narrative:     Performed at:  [place] [address] [facility]   Phone [phone]   CULTURE, BLOOD 1   CULTURE, BLOOD 2   MAGNESIUM    Narrative:     Performed at:  [place] [address] [facility]   Phone [phone]         MEDICAL DECISION MAKING        I consult patient's hematologist, Dr. Wily Mustafa, and we had a thorough conversation regarding his care. He recommended that the patient be treated for pain and if he could not be controlled to be discharged but he also informed me that the patient recently received a large number of oxycodone and has been using more than what he is prescribed of these medicines as an outpatient which may be part of the reason for presentation. I discussed this with the patient and he states he is down to just a few pills from the 60 oxycodone 10 mg pills which were prescribed 4 days ago. After several doses of pain medications in the ER he did not have relief of his pain and requested admission. Patient refused the Toradol that was recommended by his hematologist.    The total Critical Care time is 45 minutes which excludes separately billable procedures.   The critical care was concerning treatment of sickle cell pain crisis with IV fluids pain medications. This time is exclusive of any time documented by any other providers. I spoke with Dr. Hector Carl. We thoroughly discussed the history, physical exam, laboratory and imaging studies, as well as, emergency department course. Based upon that discussion, we've decided to admit [facility] for further observation and evaluation of Javier Javed's sickle pain. As I have deemed necessary from their history, physical, and studies, I have considered and evaluated [facility] for the following diagnoses:        FINAL IMPRESSION  1. Sickle-cell disease with pain ([facility]        Vitals:  Blood pressure 115/71, pulse 69, temperature 98.4 °F (36.9 °C), temperature source Oral, resp. rate 14, height 5' 8\" (1.727 m), weight 180 lb (81.6 kg), SpO2 97 %. Disposition  Pt is in stable condition upon Admit to med/surg floor. This chart was generated using the [address] [place] [address] dictation system. I created this record but it may contain dictation errors.           Sheryle Helm, MD  09/13/21 5773

## 2021-09-13 NOTE — PROGRESS NOTES
09/13/21 1719   RT Protocol   Smoking Status 0   Surgical status 0   Xray 1   Respiratory pattern 0   Mental Status 0   Breath sounds 1   Cough 0   Activity level 0   Oxygen Requirement 0   Indications for Bronchodilator Therapy None   Bronchodilator Assessment Score 2   Indications for Bronchial Hygiene None   Bronchial Hygiene Assessment Score 2   Indications for Volume Expansion None   Volume Expansion Assessment Score 2

## 2021-09-13 NOTE — CONSULTS
Consult placed    Who:Dr. Carolina Thibodeaux  Date:9/13/2021,  Time:2:09 PM  Consult sent via perfect serve      Electronically signed by Erin Dudley on 9/13/2021 at 2:09 PM

## 2021-09-13 NOTE — PROGRESS NOTES
PIV obtained. Pt unsure about midline now that he has access. Pt a/o. Oriented to room. Stated no nausea; no emesis. No SOB. Pain 9 out of 10, medicated per MAR. Per pt request, secure message sent to Dr. Robby Mims \"Pt requesting for 1 mg IV Dilaudid to be changed from Q4 to Q3. Thanks! \"    Call light within reach; will continue to monitor.

## 2021-09-13 NOTE — ED NOTES
9041 - PS to Dr Susy De La Paz at [facility] per consult  Re:  sickle pain crisis  0237 - Dr Susy De La Paz called back to speak with Dr Ashleigh Aquino  09/13/21 0892

## 2021-09-13 NOTE — PROGRESS NOTES
Writer spoke to [address] [place] [address] about pt's pain medication request: no new orders. Pt informed that Dilaudid will remain Q4. Writer also discussed with pt the prn pain medications that are currently available on the [facility]. Menu and urinal provided for pt. Denies any needs at this time. Will continue to monitor.

## 2021-09-13 NOTE — PROGRESS NOTES
If pt decids to proceed with midline placement, please notify clinical to contact DIT for placement.

## 2021-09-14 LAB
A/G RATIO: 1.3 (ref 1.1–2.2)
ABO/RH: NORMAL
ALBUMIN SERPL-MCNC: 3.9 G/DL (ref 3.4–5)
ALP BLD-CCNC: 107 U/L (ref 40–129)
ALT SERPL-CCNC: 17 U/L (ref 10–40)
ANION GAP SERPL CALCULATED.3IONS-SCNC: 8 MMOL/L (ref 3–16)
ANTIBODY SCREEN: NORMAL
AST SERPL-CCNC: 24 U/L (ref 15–37)
BASOPHILS ABSOLUTE: 0.1 K/UL (ref 0–0.2)
BASOPHILS RELATIVE PERCENT: 0.8 %
BILIRUB SERPL-MCNC: 4 MG/DL (ref 0–1)
BUN BLDV-MCNC: 10 MG/DL (ref 7–20)
CALCIUM SERPL-MCNC: 9.2 MG/DL (ref 8.3–10.6)
CHLORIDE BLD-SCNC: 107 MMOL/L (ref 99–110)
CO2: 26 MMOL/L (ref 21–32)
CREAT SERPL-MCNC: 0.7 MG/DL (ref 0.9–1.3)
EOSINOPHILS ABSOLUTE: 0.4 K/UL (ref 0–0.6)
EOSINOPHILS RELATIVE PERCENT: 2.6 %
FERRITIN: 1392 NG/ML (ref 30–400)
GFR AFRICAN AMERICAN: >60
GFR NON-AFRICAN AMERICAN: >60
GLOBULIN: 2.9 G/DL
GLUCOSE BLD-MCNC: 121 MG/DL (ref 70–99)
HAPTOGLOBIN: <10 MG/DL (ref 30–200)
HCT VFR BLD CALC: 21.8 % (ref 40.5–52.5)
HCT VFR BLD CALC: 24.5 % (ref 40.5–52.5)
HEMATOLOGY PATH CONSULT: NO
HEMOGLOBIN: 7.6 G/DL (ref 13.5–17.5)
IMMATURE RETIC FRACT: 0.67 (ref 0.21–0.37)
LYMPHOCYTES ABSOLUTE: 3 K/UL (ref 1–5.1)
LYMPHOCYTES RELATIVE PERCENT: 22.2 %
MCH RBC QN AUTO: 34.8 PG (ref 26–34)
MCHC RBC AUTO-ENTMCNC: 34.7 G/DL (ref 31–36)
MCV RBC AUTO: 100.1 FL (ref 80–100)
MONOCYTES ABSOLUTE: 1.8 K/UL (ref 0–1.3)
MONOCYTES RELATIVE PERCENT: 13.1 %
NEUTROPHILS ABSOLUTE: 8.4 K/UL (ref 1.7–7.7)
NEUTROPHILS RELATIVE PERCENT: 61.3 %
PDW BLD-RTO: 21.7 % (ref 12.4–15.4)
PLATELET # BLD: 468 K/UL (ref 135–450)
PMV BLD AUTO: 7.6 FL (ref 5–10.5)
POTASSIUM REFLEX MAGNESIUM: 3.6 MMOL/L (ref 3.5–5.1)
RBC # BLD: 2.18 M/UL (ref 4.2–5.9)
RETICULOCYTE ABSOLUTE COUNT: 0.29 M/UL
RETICULOCYTE COUNT PCT: 11.95 % (ref 0.5–2.18)
SODIUM BLD-SCNC: 141 MMOL/L (ref 136–145)
TOTAL PROTEIN: 6.8 G/DL (ref 6.4–8.2)
WBC # BLD: 13.7 K/UL (ref 4–11)

## 2021-09-14 PROCEDURE — 6370000000 HC RX 637 (ALT 250 FOR IP): Performed by: INTERNAL MEDICINE

## 2021-09-14 PROCEDURE — 86923 COMPATIBILITY TEST ELECTRIC: CPT

## 2021-09-14 PROCEDURE — 2580000003 HC RX 258: Performed by: INTERNAL MEDICINE

## 2021-09-14 PROCEDURE — 86901 BLOOD TYPING SEROLOGIC RH(D): CPT

## 2021-09-14 PROCEDURE — 80053 COMPREHEN METABOLIC PANEL: CPT

## 2021-09-14 PROCEDURE — 82728 ASSAY OF FERRITIN: CPT

## 2021-09-14 PROCEDURE — 1200000000 HC SEMI PRIVATE

## 2021-09-14 PROCEDURE — 36415 COLL VENOUS BLD VENIPUNCTURE: CPT

## 2021-09-14 PROCEDURE — 36430 TRANSFUSION BLD/BLD COMPNT: CPT

## 2021-09-14 PROCEDURE — 86850 RBC ANTIBODY SCREEN: CPT

## 2021-09-14 PROCEDURE — P9016 RBC LEUKOCYTES REDUCED: HCPCS

## 2021-09-14 PROCEDURE — 6360000002 HC RX W HCPCS: Performed by: NURSE PRACTITIONER

## 2021-09-14 PROCEDURE — 83010 ASSAY OF HAPTOGLOBIN QUANT: CPT

## 2021-09-14 PROCEDURE — 86900 BLOOD TYPING SEROLOGIC ABO: CPT

## 2021-09-14 PROCEDURE — 85025 COMPLETE CBC W/AUTO DIFF WBC: CPT

## 2021-09-14 PROCEDURE — 6370000000 HC RX 637 (ALT 250 FOR IP): Performed by: NURSE PRACTITIONER

## 2021-09-14 PROCEDURE — 6360000002 HC RX W HCPCS: Performed by: INTERNAL MEDICINE

## 2021-09-14 RX ORDER — SODIUM CHLORIDE 9 MG/ML
INJECTION, SOLUTION INTRAVENOUS PRN
Status: DISCONTINUED | OUTPATIENT
Start: 2021-09-14 | End: 2021-09-15 | Stop reason: HOSPADM

## 2021-09-14 RX ORDER — FOLIC ACID 1 MG/1
2 TABLET ORAL DAILY
Status: DISCONTINUED | OUTPATIENT
Start: 2021-09-14 | End: 2021-09-15 | Stop reason: HOSPADM

## 2021-09-14 RX ADMIN — OXYCODONE HYDROCHLORIDE 20 MG: 20 TABLET, FILM COATED, EXTENDED RELEASE ORAL at 10:59

## 2021-09-14 RX ADMIN — KETOROLAC TROMETHAMINE 30 MG: 30 INJECTION, SOLUTION INTRAMUSCULAR; INTRAVENOUS at 14:48

## 2021-09-14 RX ADMIN — Medication 10 ML: at 20:24

## 2021-09-14 RX ADMIN — HYDROMORPHONE HYDROCHLORIDE 1 MG: 2 INJECTION, SOLUTION INTRAMUSCULAR; INTRAVENOUS; SUBCUTANEOUS at 11:53

## 2021-09-14 RX ADMIN — OXYCODONE 10 MG: 5 TABLET ORAL at 04:33

## 2021-09-14 RX ADMIN — PENICILLIN V POTASSIUM 500 MG: 250 TABLET, FILM COATED ORAL at 20:24

## 2021-09-14 RX ADMIN — FOLIC ACID 2 MG: 1 TABLET ORAL at 11:01

## 2021-09-14 RX ADMIN — OXYCODONE 10 MG: 5 TABLET ORAL at 14:48

## 2021-09-14 RX ADMIN — HYDROXYUREA 1000 MG: 500 CAPSULE ORAL at 20:23

## 2021-09-14 RX ADMIN — HYDROMORPHONE HYDROCHLORIDE 1 MG: 2 INJECTION, SOLUTION INTRAMUSCULAR; INTRAVENOUS; SUBCUTANEOUS at 21:13

## 2021-09-14 RX ADMIN — OXYCODONE 10 MG: 5 TABLET ORAL at 18:50

## 2021-09-14 RX ADMIN — HYDROXYUREA 1000 MG: 500 CAPSULE ORAL at 11:00

## 2021-09-14 RX ADMIN — OXYCODONE HYDROCHLORIDE 20 MG: 20 TABLET, FILM COATED, EXTENDED RELEASE ORAL at 20:22

## 2021-09-14 RX ADMIN — KETOROLAC TROMETHAMINE 30 MG: 30 INJECTION, SOLUTION INTRAMUSCULAR; INTRAVENOUS at 04:33

## 2021-09-14 RX ADMIN — PENICILLIN V POTASSIUM 500 MG: 250 TABLET, FILM COATED ORAL at 11:53

## 2021-09-14 RX ADMIN — HYDROMORPHONE HYDROCHLORIDE 1 MG: 2 INJECTION, SOLUTION INTRAMUSCULAR; INTRAVENOUS; SUBCUTANEOUS at 16:11

## 2021-09-14 RX ADMIN — HYDROMORPHONE HYDROCHLORIDE 1 MG: 2 INJECTION, SOLUTION INTRAMUSCULAR; INTRAVENOUS; SUBCUTANEOUS at 02:59

## 2021-09-14 RX ADMIN — HYDROMORPHONE HYDROCHLORIDE 1 MG: 2 INJECTION, SOLUTION INTRAMUSCULAR; INTRAVENOUS; SUBCUTANEOUS at 07:37

## 2021-09-14 RX ADMIN — OXYCODONE 10 MG: 5 TABLET ORAL at 23:31

## 2021-09-14 RX ADMIN — PENICILLIN V POTASSIUM 500 MG: 250 TABLET, FILM COATED ORAL at 16:12

## 2021-09-14 RX ADMIN — SODIUM CHLORIDE: 9 INJECTION, SOLUTION INTRAVENOUS at 04:39

## 2021-09-14 RX ADMIN — PENICILLIN V POTASSIUM 500 MG: 250 TABLET, FILM COATED ORAL at 14:41

## 2021-09-14 ASSESSMENT — PAIN SCALES - GENERAL
PAINLEVEL_OUTOF10: 8
PAINLEVEL_OUTOF10: 9
PAINLEVEL_OUTOF10: 10
PAINLEVEL_OUTOF10: 8
PAINLEVEL_OUTOF10: 8
PAINLEVEL_OUTOF10: 9
PAINLEVEL_OUTOF10: 10
PAINLEVEL_OUTOF10: 9

## 2021-09-14 NOTE — PROGRESS NOTES
Pt a/o. Pt tolerated 1 unit PRBC without any adverse reactions noted. Appetite good. Eating at least 50% of meals. Taking in plenty of fluids. Ambulating independently in room. Call light within reach; will continue to monitor.

## 2021-09-14 NOTE — CONSULTS
Consult Call Back    Who:Magdalena Warren  Date:9/14/2021,  Time:10:26 AM    Electronically signed by Ian Nascimento on 9/14/21 at 10:26 AM EDT

## 2021-09-14 NOTE — CARE COORDINATION
CASE MANAGEMENT INITIAL ASSESSMENT      Reviewed chart and completed assessment with:patient  Explained Case Management role/services. Primary contact information:[facility] Decision Maker :   Primary Decision Maker: Kacy Whitlock - Parent - [phone]    Secondary Decision Maker: Daniela Nunes - Parent - [phone]          Can this person be reached and be able to respond quickly, such as within a few minutes or hours? Yes    Admit date/status:9/13/21  Diagnosis:sickle cell    Is this a Readmission?:  Yes     Insurance:[employer]   Precert required for SNF: No       3 night stay required: No    Living arrangements, Adls, care needs, prior to admission:lives in home with parents. Transportation:private     Durable Medical Equipment at home:  Walker__Cane__RTS__ BSC__Shower Chair__  02__ HHN__ CPAP__  BiPap__  Hospital Bed__ W/C___ Other__________    Services in the home and/or outpatient, prior to [address] (if applicable)   · Name:  · Address:  · Dialysis Schedule:  · Phone:  · Fax:    PT/OT recs:none    Hospital Exemption Notification (HEN):needed for SNf    Barriers to discharge:nne    Plan/comments:patient from home with support of parents. IPTA no DCP needs. D/c pending pain control. PRBC today.  Bernabe Horn, RN      ECOC on chart for MD signature

## 2021-09-14 NOTE — CONSULTS
docusate sodium (COLACE) capsule 100 mg  100 mg Oral Daily Delfina Gonzáles MD        hydroxyurea (HYDREA) chemo capsule 1,000 mg  1,000 mg Oral BID Delfina Gonzáles MD   1,000 mg at 09/13/21 2135    levalbuterol (XOPENEX) nebulizer solution 0.3 mg  0.3 mg Nebulization Q4H PRN Bean Castillo MD        oxyCODONE (OXYCONTIN) extended release tablet 20 mg  20 mg Oral 2 times per day Delfina Gonzáles MD   20 mg at 09/13/21 2040    oxyCODONE (ROXICODONE) immediate release tablet 10 mg  10 mg Oral Q4H PRN Delfina Gonzáles MD   10 mg at 09/14/21 0433    penicillin v potassium (VEETID) tablet 500 mg  500 mg Oral 4x Daily Delfina Gonzáles MD   500 mg at 09/13/21 2040    sodium chloride flush 0.9 % injection 5-40 mL  5-40 mL IntraVENous 2 times per day Delfina Gonzáles MD   10 mL at 09/13/21 2040    sodium chloride flush 0.9 % injection 5-40 mL  5-40 mL IntraVENous PRN Delfina Gonzáles MD        0.9 % sodium chloride infusion  25 mL IntraVENous PRN Delfina Gonzáles MD        enoxaparin (LOVENOX) injection 40 mg  40 mg SubCUTAneous Daily Delfina Gonzáles MD        ondansetron (ZOFRAN-ODT) disintegrating tablet 4 mg  4 mg Oral Q8H PRN Delfina Gonzáles MD        Or    ondansetron (ZOFRAN) injection 4 mg  4 mg IntraVENous Q6H PRN Delfina Gonzáles MD        polyethylene glycol (GLYCOLAX) packet 17 g  17 g Oral Daily PRN Delfina Gonzáles MD        acetaminophen (TYLENOL) tablet 650 mg  650 mg Oral Q6H PRN Delfina Gonzáles MD        Or    acetaminophen (TYLENOL) suppository 650 mg  650 mg Rectal Q6H PRN Delfina Gonzáles MD        0.9 % sodium chloride infusion   IntraVENous Continuous Delfina Gonzáles  mL/hr at 09/14/21 0439 New Bag at 09/14/21 0439    HYDROmorphone (DILAUDID) injection 1 mg  1 mg IntraVENous Q4H PRN CARMELITA Pascal - CNP   1 mg at 09/14/21 0737    ketorolac (TORADOL) injection 30 mg  30 mg IntraVENous Q6H PRN Delfina Gonzáles MD   30 mg at 09/14/21 6524       Allergies: Allergies   Allergen Reactions    Morphine Shortness Of Breath     Other reaction(s): Histamine-like Reaction  Has asthma exacerbation with morphine-histamine type reaction         Social History:     Social History     Socioeconomic History    Marital status: Single     Spouse name: Not on file    Number of children: 0    Years of education: Not on file    Highest education level: Not on file   Occupational History    Not on file   Tobacco Use    Smoking status: Never Smoker    Smokeless tobacco: Never Used   Vaping Use    Vaping Use: Never used   Substance and Sexual Activity    Alcohol use: Not Currently    Drug use: Never    Sexual activity: Not Currently   Other Topics Concern    Not on file   Social History Narrative    Not on file     Social Determinants of Health     Financial Resource Strain:     Difficulty of Paying Living Expenses:    Food Insecurity:     Worried About Running Out of Food in the Last Year:     Ran Out of Food in the Last Year:    Transportation Needs:     Lack of Transportation (Medical):      Lack of Transportation (Non-Medical):    Physical Activity:     Days of Exercise per Week:     Minutes of Exercise per Session:    Stress:     Feeling of Stress :    Social Connections:     Frequency of Communication with Friends and Family:     Frequency of Social Gatherings with Friends and Family:     Attends Evangelical Services:     Active Member of Clubs or Organizations:     Attends Club or Organization Meetings:     Marital Status:    Intimate Partner Violence:     Fear of Current or Ex-Partner:     Emotionally Abused:     Physically Abused:     Sexually Abused:      Social History     Substance and Sexual Activity   Drug Use Never     Social History     Substance and Sexual Activity   Alcohol Use Not Currently     Social History     Substance and Sexual Activity   Sexual Activity Not Currently     Social History     Tobacco Use Smoking Status Never Smoker   Smokeless Tobacco Never Used       Family History:     Family History   Problem Relation Age of Onset    Other Father         sarcoidosis       Review of Systems:     Constitutional: Denies fever, sweats, weight loss. .     Eyes: No visual changes or diplopia. No scleral icterus. ENT: No Headaches, no hearing loss, no  vertigo. No mouth sores or sore throat. Cardiovascular: No chest pain, no dyspnea on exertion, no palpitations, no  loss of consciousness. Respiratory: No cough, no  wheezing, no dyspnea, no sputum production. No hemoptysis. .    Gastrointestinal: No abdominal pain, no appetite loss, no blood in stools. No change in bowel habits. Genitourinary: No dysuria, trouble voiding, or hematuria. Musculoskeletal:  Generalized weakness. No joint complaints. Integumentary: No rash or pruritis. Neurological: No headache, diplopia. No change in gait, balance, or coordination. No paresthesias. Endocrine: No temperature intolerance. No excessive thirst, fluid intake, or urination. Hematologic/Lymphatic: No abnormal bruising or ecchymoses, no blood clots or swollen lymph nodes. Allergic/Immunologic: No nasal congestion or hives. Physical Exam:     BP (!) 96/54   Pulse 71   Temp 97.5 °F (36.4 °C) (Oral)   Resp 14   Ht 5' 8\" (1.727 m)   Wt 180 lb (81.6 kg)   SpO2 95%   BMI 27.37 kg/m²     CONSTITUTIONAL: awake, alert, cooperative, no apparent distress. EYES:  Pupils equal, round and reactive to light, sclera non-icteric, conjunctiva normal  ENT:  Normocephalic, without obvious abnormality, atraumatic, sinuses nontender on palpation, external ears without lesions, oral pharynx with moist mucus membranes, no mucositis.   NECK:  Supple, symmetrical, trachea midline, no adenopathy, thyroid symmetric, not enlarged and no tenderness, skin normal  HEMATOLOGIC/LYMPHATICS:  no cervical lymphadenopathy, no supraclavicular lymphadenopathy, no axillary lymphadenopathy and no 06/09/2021    AMORPHOUS Rare 01/05/2021       Imaging:       Impression:     Sickle Cell Pain   Anemia   Narcotic Dependence     Plan:      Sickle Cell   - Re-current admits due to non compliance. 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 He was told this has a stop date due to urgency to transition him to Oral narcotics due to weekly admissions and concern for abuse/non-compliance               DVT Prophylaxis     Thank you very much for allowing me to participate in the care of this patient.     Sarah Triplett CNP   Medical Oncology/Hematology    [facility] - [address],  [place], [place] [address]  Phone: [phone]  Fax: [phone]

## 2021-09-14 NOTE — PROGRESS NOTES
Hospitalist Progress Note      PCP: Elmer Bautista MD    Date of Admission: 9/13/2021    Chief Complaint: Pain in bilateral shoulders, elbows and knees    Hospital Course: Patient admitted with sickle cell crisis    Subjective:     Patient reports generalized body pain fairly unchanged. Requiring frequent IV pain meds. Medications:  Reviewed    Infusion Medications    sodium chloride      sodium chloride      sodium chloride 100 mL/hr at 09/14/21 0439     Scheduled Medications    folic acid  2 mg Oral Daily    ketorolac  60 mg IntraMUSCular Once    docusate sodium  100 mg Oral Daily    hydroxyurea  1,000 mg Oral BID    oxyCODONE  20 mg Oral 2 times per day    penicillin v potassium  500 mg Oral 4x Daily    sodium chloride flush  5-40 mL IntraVENous 2 times per day    enoxaparin  40 mg SubCUTAneous Daily     PRN Meds: sodium chloride, albuterol sulfate HFA, levalbuterol, oxyCODONE HCl, sodium chloride flush, sodium chloride, ondansetron **OR** ondansetron, polyethylene glycol, acetaminophen **OR** acetaminophen, HYDROmorphone, ketorolac      Intake/Output Summary (Last 24 hours) at 9/14/2021 1013  Last data filed at 9/13/2021 1952  Gross per 24 hour   Intake 240 ml   Output    Net 240 ml       Physical Exam Performed:    BP (!) 96/54   Pulse 71   Temp 97.5 °F (36.4 °C) (Oral)   Resp 14   Ht 5' 8\" (1.727 m)   Wt 180 lb (81.6 kg)   SpO2 95%   BMI 27.37 kg/m²     General appearance: No apparent distress, appears stated age and cooperative. HEENT: Pupils equal, round. Icteric (baseline per patient)   Neck: Supple, with full range of motion. No jugular venous distention. Trachea midline. Respiratory:  Normal respiratory effort. Clear to auscultation, bilaterally without Rales/Wheezes/Rhonchi. Cardiovascular: Regular rate and rhythm with normal S1/S2 without murmurs, rubs or gallops. Abdomen: Soft, non-tender, non-distended with normal bowel sounds.   Musculoskeletal: No clubbing, cyanosis or edema bilaterally. Skin: Warm and dry  Neurologic: Alert, speech clear with no overt facial droop  Psychiatric: Alert and oriented, thought content appropriate, normal insight        Labs:   Recent Labs     09/13/21 0616 09/14/21  0545   WBC 17.2* 13.7*   HGB 8.2* 7.6*   HCT 23.7* 21.8*   * 468*     Recent Labs     09/13/21 0616 09/14/21  0545    141   K 3.5 3.6    107   CO2 23 26   BUN 10 10   CREATININE 0.6* 0.7*   CALCIUM 9.2 9.2     Recent Labs     09/13/21  0616 09/14/21  0545   AST 28 24   ALT 16 17   BILITOT 5.1* 4.0*   ALKPHOS 119 107     No results for input(s): INR in the last 72 hours. No results for input(s): Abbey Amelia in the last 72 hours. Urinalysis:      Lab Results   Component Value Date    NITRU Negative 06/09/2021    WBCUA None seen 06/09/2021    BACTERIA Rare 06/09/2021    RBCUA 0-2 06/09/2021    BLOODU TRACE-INTACT 06/09/2021    SPECGRAV 1.010 06/09/2021    GLUCOSEU Negative 06/09/2021       Radiology:  IR MIDLINE CATH    (Results Pending)           Assessment/Plan:    Active Hospital Problems    Diagnosis     Sickle cell crisis ([facility] [address] [D57.00]      Sickle cell crisis: Recurrent. Hematology recs appreciated. 1 PRBC ordered today. Continue supportive care with pain control, IVF, home dose of hydroxyurea        Leukocytosis: Chronic on chart review. No overt signs of infection. Patient reports that he was started on veetid on 9/7/2021 for a recent tooth infection which he is to complete on 9/17/2021. Denies any tooth or jaw pain today. Continue outpatient abx as prescribed. blood cultures  pending          Thrombocytosis:  Likely secondary to autosplenectomy per heme-onc. Improving     DVT Prophylaxis: Lovenox          Diet: ADULT DIET;  Regular  Code Status: Full Code    PT/OT Eval Status: Ordered    Dispo -likely when okay with heme-onc, patient's pain fairly controlled     Ramirez Gomez MD

## 2021-09-14 NOTE — PROGRESS NOTES
Pt A&O x4. VSS. Denies dyspnea and N/V. Bowel sounds active x4. Pt reports pain 9/10 all over. PRN pain medications given, see eMAR. Assessment is as charted. Call light and bedside table within reach, wheels locked, bed in lowest position, Pt instructed to call out for assistance and expressed understanding, calls out appropriately.

## 2021-09-15 VITALS
WEIGHT: 180 LBS | SYSTOLIC BLOOD PRESSURE: 121 MMHG | HEIGHT: 68 IN | OXYGEN SATURATION: 96 % | RESPIRATION RATE: 16 BRPM | TEMPERATURE: 97.6 F | HEART RATE: 65 BPM | DIASTOLIC BLOOD PRESSURE: 79 MMHG | BODY MASS INDEX: 27.28 KG/M2

## 2021-09-15 LAB
A/G RATIO: 1.2 (ref 1.1–2.2)
ALBUMIN SERPL-MCNC: 3.5 G/DL (ref 3.4–5)
ALP BLD-CCNC: 104 U/L (ref 40–129)
ALT SERPL-CCNC: 14 U/L (ref 10–40)
ANION GAP SERPL CALCULATED.3IONS-SCNC: 10 MMOL/L (ref 3–16)
AST SERPL-CCNC: 25 U/L (ref 15–37)
BASOPHILS ABSOLUTE: 0.1 K/UL (ref 0–0.2)
BASOPHILS RELATIVE PERCENT: 1.3 %
BILIRUB SERPL-MCNC: 3.7 MG/DL (ref 0–1)
BUN BLDV-MCNC: 8 MG/DL (ref 7–20)
CALCIUM SERPL-MCNC: 9.1 MG/DL (ref 8.3–10.6)
CHLORIDE BLD-SCNC: 108 MMOL/L (ref 99–110)
CO2: 21 MMOL/L (ref 21–32)
CREAT SERPL-MCNC: 0.7 MG/DL (ref 0.9–1.3)
EOSINOPHILS ABSOLUTE: 0.3 K/UL (ref 0–0.6)
EOSINOPHILS RELATIVE PERCENT: 2.8 %
GFR AFRICAN AMERICAN: >60
GFR NON-AFRICAN AMERICAN: >60
GLOBULIN: 2.9 G/DL
GLUCOSE BLD-MCNC: 105 MG/DL (ref 70–99)
HCT VFR BLD CALC: 28.2 % (ref 40.5–52.5)
HEMOGLOBIN: 9.5 G/DL (ref 13.5–17.5)
LYMPHOCYTES ABSOLUTE: 3.5 K/UL (ref 1–5.1)
LYMPHOCYTES RELATIVE PERCENT: 29.9 %
MCH RBC QN AUTO: 33.1 PG (ref 26–34)
MCHC RBC AUTO-ENTMCNC: 33.5 G/DL (ref 31–36)
MCV RBC AUTO: 98.7 FL (ref 80–100)
MONOCYTES ABSOLUTE: 1.1 K/UL (ref 0–1.3)
MONOCYTES RELATIVE PERCENT: 9.6 %
NEUTROPHILS ABSOLUTE: 6.5 K/UL (ref 1.7–7.7)
NEUTROPHILS RELATIVE PERCENT: 56.4 %
PDW BLD-RTO: 22.6 % (ref 12.4–15.4)
PLATELET # BLD: ABNORMAL K/UL (ref 135–450)
PLATELET SLIDE REVIEW: ABNORMAL
PMV BLD AUTO: ABNORMAL FL (ref 5–10.5)
POTASSIUM REFLEX MAGNESIUM: 4 MMOL/L (ref 3.5–5.1)
RBC # BLD: 2.86 M/UL (ref 4.2–5.9)
SLIDE REVIEW: ABNORMAL
SODIUM BLD-SCNC: 139 MMOL/L (ref 136–145)
TOTAL PROTEIN: 6.4 G/DL (ref 6.4–8.2)
WBC # BLD: 11.6 K/UL (ref 4–11)

## 2021-09-15 PROCEDURE — 36415 COLL VENOUS BLD VENIPUNCTURE: CPT

## 2021-09-15 PROCEDURE — 85025 COMPLETE CBC W/AUTO DIFF WBC: CPT

## 2021-09-15 PROCEDURE — 6360000002 HC RX W HCPCS: Performed by: NURSE PRACTITIONER

## 2021-09-15 PROCEDURE — 97116 GAIT TRAINING THERAPY: CPT

## 2021-09-15 PROCEDURE — 2580000003 HC RX 258: Performed by: INTERNAL MEDICINE

## 2021-09-15 PROCEDURE — 80053 COMPREHEN METABOLIC PANEL: CPT

## 2021-09-15 PROCEDURE — 97165 OT EVAL LOW COMPLEX 30 MIN: CPT

## 2021-09-15 PROCEDURE — 6360000002 HC RX W HCPCS: Performed by: INTERNAL MEDICINE

## 2021-09-15 PROCEDURE — 6370000000 HC RX 637 (ALT 250 FOR IP): Performed by: NURSE PRACTITIONER

## 2021-09-15 PROCEDURE — 97161 PT EVAL LOW COMPLEX 20 MIN: CPT

## 2021-09-15 PROCEDURE — 6370000000 HC RX 637 (ALT 250 FOR IP): Performed by: INTERNAL MEDICINE

## 2021-09-15 RX ORDER — FOLIC ACID 1 MG/1
2 TABLET ORAL DAILY
Qty: 30 TABLET | Refills: 3 | Status: SHIPPED | OUTPATIENT
Start: 2021-09-16

## 2021-09-15 RX ADMIN — PENICILLIN V POTASSIUM 500 MG: 250 TABLET, FILM COATED ORAL at 08:36

## 2021-09-15 RX ADMIN — KETOROLAC TROMETHAMINE 30 MG: 30 INJECTION, SOLUTION INTRAMUSCULAR; INTRAVENOUS at 01:14

## 2021-09-15 RX ADMIN — OXYCODONE 10 MG: 5 TABLET ORAL at 08:35

## 2021-09-15 RX ADMIN — OXYCODONE HYDROCHLORIDE 20 MG: 20 TABLET, FILM COATED, EXTENDED RELEASE ORAL at 12:02

## 2021-09-15 RX ADMIN — HYDROMORPHONE HYDROCHLORIDE 1 MG: 2 INJECTION, SOLUTION INTRAMUSCULAR; INTRAVENOUS; SUBCUTANEOUS at 05:20

## 2021-09-15 RX ADMIN — HYDROMORPHONE HYDROCHLORIDE 1 MG: 2 INJECTION, SOLUTION INTRAMUSCULAR; INTRAVENOUS; SUBCUTANEOUS at 01:14

## 2021-09-15 RX ADMIN — OXYCODONE 10 MG: 5 TABLET ORAL at 12:39

## 2021-09-15 RX ADMIN — FOLIC ACID 2 MG: 1 TABLET ORAL at 08:36

## 2021-09-15 RX ADMIN — OXYCODONE 10 MG: 5 TABLET ORAL at 04:06

## 2021-09-15 RX ADMIN — PENICILLIN V POTASSIUM 500 MG: 250 TABLET, FILM COATED ORAL at 12:26

## 2021-09-15 RX ADMIN — Medication 10 ML: at 08:36

## 2021-09-15 RX ADMIN — HYDROXYUREA 1000 MG: 500 CAPSULE ORAL at 08:36

## 2021-09-15 RX ADMIN — KETOROLAC TROMETHAMINE 30 MG: 30 INJECTION, SOLUTION INTRAMUSCULAR; INTRAVENOUS at 08:36

## 2021-09-15 RX ADMIN — SODIUM CHLORIDE: 9 INJECTION, SOLUTION INTRAVENOUS at 01:18

## 2021-09-15 ASSESSMENT — PAIN SCALES - GENERAL
PAINLEVEL_OUTOF10: 8
PAINLEVEL_OUTOF10: 7
PAINLEVEL_OUTOF10: 8
PAINLEVEL_OUTOF10: 7
PAINLEVEL_OUTOF10: 8
PAINLEVEL_OUTOF10: 8

## 2021-09-15 ASSESSMENT — PAIN DESCRIPTION - PAIN TYPE: TYPE: CHRONIC PAIN

## 2021-09-15 ASSESSMENT — PAIN DESCRIPTION - LOCATION: LOCATION: GENERALIZED

## 2021-09-15 NOTE — PROGRESS NOTES
Pt is a/o x4. VSS. Assessment as charted. - Pt complaining of generalized body aches, especially in his knees. Scheduled OxyContin, PRN Claire, and PRN Dilaudid are given per MD order. Pt stating that the only thing that helps is the Dilaudid- pt educated that this was being taken away tomorrow per [facility] note and that we would need to try to wean this evening- he verbalized his understanding- pt persistently requested the Dilaudid. Pt is currently resting in his bed that is locked and in its lowest position w/ his call light within reach, non-skid socks on, and bed alarm off d/t pt being independent. Pt denies any other needs at this time.

## 2021-09-15 NOTE — PROGRESS NOTES
Cell Crisis  Follows Commands: Within Functional Limits  General Comment  Comments: Rn cleared pt for therapy eval  Subjective  Subjective: Pt supine in bed upon arrival, agreeable to PT eval  Pain Screening  Patient Currently in Pain: Yes  Pain Assessment  Pain Assessment: 0-10  Pain Level: 7  Pain Type: Chronic pain  Pain Location: Generalized  Vital Signs  Pulse: 62  Heart Rate Source: Monitor  BP: 124/78  BP Location: Left upper arm  Patient Position: Sitting  Patient Currently in Pain: Yes  Oxygen Therapy  SpO2: 98 %  Pulse Oximeter Device Mode: Intermittent  Pulse Oximeter Device Location: Finger  O2 Device: None (Room air)  Pre Treatment Pain Screening  Intervention List: Patient able to continue with treatment    Orientation  Orientation  Overall Orientation Status: Within Normal Limits     Social/Functional History  Social/Functional History  Lives With: Family (Parents)  Type of Home: House  Home Layout: Two level  Home Access: Stairs to enter without rails  Entrance Stairs - Number of Steps: 1  Bathroom Toilet: Standard  ADL Assistance: Independent  Homemaking Assistance: Independent  Homemaking Responsibilities: Yes  Ambulation Assistance: Independent  Transfer Assistance: Independent  Active : Yes  Additional Comments: Pt reports being completely independent prior to admission    Objective  ROM/Strength  PROM RLE (degrees)  RLE PROM: WNL  AROM RLE (degrees)  RLE AROM: WNL  PROM LLE (degrees)  LLE PROM: WNL  AROM LLE (degrees)  LLE AROM : WNL  Strength RLE  Strength RLE: WNL  Strength LLE  Strength LLE: WNL     Sensation  Overall Sensation Status: WNL     Bed mobility  Rolling to Left: Independent  Rolling to Right: Independent  Supine to Sit: Independent  Sit to Supine: Independent  Scooting: Independent  Comment: HOB flat, no use of bedrails     Transfers  Sit to Stand: Independent  Stand to sit:  Independent     Ambulation  Ambulation?: Yes  Ambulation 1  Surface: level tile  Device: No Device  Assistance: Independent  Quality of Gait: Mildly antalgic  Gait Deviations: Slow Marsha  Distance: 50 ft  Comments: Pt ambulates with mildly antalgic gait pattern due to pain. No LOB.  Denies dizziness with ambulation     Balance  Posture: Good  Sitting - Static: Good  Sitting - Dynamic: Good  Standing - Static: Good  Standing - Dynamic: Good  Comments: IND with sitting and standing balance, no device        Plan   Plan  Times per week: 1x only  Safety Devices  Type of devices: Nurse notified    AM-PAC Score  AM-PAC Inpatient Mobility Raw Score : 24 (09/15/21 0830)  AM-PAC Inpatient T-Scale Score : 61.14 (09/15/21 0830)  Mobility Inpatient CMS 0-100% Score: 0 (09/15/21 0830)  Mobility Inpatient CMS G-Code Modifier : [facility] (09/15/21 0830)        Goals  Short term goals  Time Frame for Short term goals: 1 session  Short term goal 1: Pt will perform functional transfers IND--Goal Met 9/15  Patient Goals   Patient goals : \"to go home\"       Therapy Time   Individual Concurrent Group Co-treatment   Time In 0807         Time Out 0826         Minutes 19         Timed Code Treatment Minutes: 9 Minutes (10 min eval)       Vamsi Fowler PT

## 2021-09-15 NOTE — PROGRESS NOTES
Warm and dry  Neurologic: Alert, speech clear with no overt facial droop  Psychiatric: Alert and oriented, thought content appropriate, normal insight        Labs:   Recent Labs     09/13/21  0616 09/13/21  0616 09/13/21  2154 09/14/21  0545 09/15/21  0552   WBC 17.2*  --   --  13.7* 11.6*   HGB 8.2*  --   --  7.6* 9.5*   HCT 23.7*   < > 24.5* 21.8* 28.2*   *  --   --  468* see below    < > = values in this interval not displayed. Recent Labs     09/13/21  0616 09/14/21  0545 09/15/21  0551    141 139   K 3.5 3.6 4.0    107 108   CO2 23 26 21   BUN 10 10 8   CREATININE 0.6* 0.7* 0.7*   CALCIUM 9.2 9.2 9.1     Recent Labs     09/13/21  0616 09/14/21  0545 09/15/21  0551   AST 28 24 25   ALT 16 17 14   BILITOT 5.1* 4.0* 3.7*   ALKPHOS 119 107 104     No results for input(s): INR in the last 72 hours. No results for input(s): Rik Seed in the last 72 hours. Urinalysis:      Lab Results   Component Value Date    NITRU Negative 06/09/2021    WBCUA None seen 06/09/2021    BACTERIA Rare 06/09/2021    RBCUA 0-2 06/09/2021    BLOODU TRACE-INTACT 06/09/2021    SPECGRAV 1.010 06/09/2021    GLUCOSEU Negative 06/09/2021       Radiology:  IR MIDLINE CATH    (Results Pending)           Assessment/Plan:    Active Hospital Problems    Diagnosis     Sickle cell crisis ([facility] [address] [D57.00]      Sickle cell crisis: Recurrent.  hemonc assisting. S/p 1 PRBC yesterday with improvement in anemia. Continue supportive care with pain control, IVF, home dose of hydroxyurea        Leukocytosis: Chronic on chart review. No overt signs of infection. Patient reported that he was started on veetid on 9/7/2021 for a recent tooth infection which he is to complete on 9/17/2021. Denies any tooth or jaw pain . Continue outpatient abx as prescribed. Wbc improving. blood cultures  with NGTD          Thrombocytosis:  Likely secondary to autosplenectomy per heme-onc.   Improving     DVT Prophylaxis: Lovenox          Diet: ADULT DIET;  Regular  Code Status: Full Code    PT/OT Eval Status: Ordered    Dispo - likely today per Shawn Phlegm MD Eliecer

## 2021-09-15 NOTE — PROGRESS NOTES
Occupational Therapy   Occupational Therapy Initial Assessment  Date: 9/15/2021   Patient Name: Lyman Seip  MRN: [id number]     : 2000    Date of Service: 9/15/2021    Discharge Recommendations:  Home with assist PRN       Assessment   Assessment: Pt is a 23 yo ordered for increased pain and weakness due to sickle cell crisis. Pt reports PLOF as IND for ADLs and mobility. Pt lives at home with his parents. Currently pt able to complete functional mobility and standing ADLs INDly. Pt tolerated session well, completed at regular pace, required no cues for safety, and had no c/o fatigue. No further OT services recommend at this time, pt appears to be at baseline level of function. Prognosis: Good  Decision Making: Low Complexity  OT Education: OT Role;ADL Adaptive Strategies;Transfer Training;Energy Conservation    Disease Specific Education: Pt educated on importance of OOB mobility, prevention of complications of bedrest, and general safety during hospitalization. Pt verbalized understanding    No Skilled OT: Independent with ADL's;At baseline function; Safe to return home  REQUIRES OT FOLLOW UP: No  Activity Tolerance  Activity Tolerance: Patient Tolerated treatment well  Activity Tolerance: BP: 121/79 Hr: 59 O2: 98% on RA  Safety Devices  Safety Devices in place: Yes  Type of devices: Call light within reach; Left in bed;Nurse notified           Patient Diagnosis(es): The encounter diagnosis was Sickle-cell disease with pain ([facility] [address] has a past medical history of Accidental overdose, Asthma, Enlarged heart, 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 ([facility] [address] and Sickle cell anemia ([facility] [address] has a past surgical history that includes Splenectomy.            Restrictions  Restrictions/Precautions  Restrictions/Precautions: Up as Tolerated  Position Activity Restriction  Other position/activity restrictions: IV    Subjective   General  Chart Reviewed: Yes  Patient assessed for rehabilitation services?: Yes  Family / Caregiver Present: No  Subjective  Subjective: Pt agreeable to therapy  General Comment  Comments: RN approved session  Patient Currently in Pain: Yes  Pain Assessment  Pain Assessment: 0-10  Pain Level: 8  Vital Signs  Patient Currently in Pain: Yes     Social/Functional History  Social/Functional History  Lives With: Family (Parents)  Type of Home: House  Home Layout: Two level  Home Access: Stairs to enter without rails  Entrance Stairs - Number of Steps: 1  Bathroom Toilet: Standard  ADL Assistance: Independent  Homemaking Assistance: Independent  Homemaking Responsibilities: Yes  Ambulation Assistance: Independent  Transfer Assistance: Independent  Active : Yes  Leisure & Hobbies: friends  Additional Comments: Pt reports being completely independent prior to admission       Objective   Vision: Impaired  Vision Exceptions: Wears glasses at all times  Hearing: Within functional limits    Orientation  Overall Orientation Status: Within Functional Limits  Observation/Palpation  Posture: Good  Balance  Sitting Balance: Independent  Standing Balance: Independent  Standing Balance  Time: ~3-4 minutes  Activity: bathroom mobility  Comment: tolerated well  Functional Mobility  Functional - Mobility Device: No device  Activity: To/from bathroom  Assist Level: Independent  Toilet Transfers  Toilet - Technique: Ambulating  Equipment Used: Standard toilet  Toilet Transfer: Independent  ADL  Feeding: Independent  Toileting: Independent  Additional Comments: Pt declined additional ADLs at this time  Tone RUE  RUE Tone: Normotonic  Tone LUE  LUE Tone: Normotonic  Coordination  Movements Are Fluid And Coordinated: Yes     Bed mobility  Rolling to Left: Independent  Rolling to Right: Independent  Supine to Sit: Independent  Sit to Supine: Independent  Scooting: Independent  Comment: HOB flat, no use of bedrails  Transfers  Sit to stand: Independent  Stand to sit:  Independent     Cognition  Overall Cognitive Status: WFL        Sensation  Overall Sensation Status: WNL        LUE AROM (degrees)  LUE AROM : WNL  RUE AROM (degrees)  RUE AROM : WNL  LUE Strength  Gross LUE Strength: WFL  L Hand General: 5/5  RUE Strength  Gross RUE Strength: WFL  R Hand General: 5/5                   Plan   Plan  Times per week: 1x only      AM-PAC Score        AM-PAC Inpatient Daily Activity Raw Score: 24 (09/15/21 1211)  AM-PAC Inpatient ADL T-Scale Score : 57.54 (09/15/21 1211)  ADL Inpatient CMS 0-100% Score: 0 (09/15/21 1211)  ADL Inpatient CMS G-Code Modifier : [address] (09/15/21 1211)    Goals  Short term goals  Time Frame for Short term goals: 1x only  Short term goal 1: Pt will complete toilet transfer with S. Goal met 9/15  Short term goal 2: Pt will complete ~3 minutes of standing ADLs without rest breaks. Goal met 9/15  Short term goal 3: Pt will complete bed mobility INDly. Goal jessa 9/15  Patient Goals   Patient goals : \"to go home\"       Therapy Time   Individual Concurrent Group Co-treatment   Time In 0641         Time Out 0848         Minutes 14         Timed Code Treatment Minutes: 0 Minutes (10 for eval)       N[place]ama/OT      Therapist/clinical instructor present during entire session to direct all aspects of assessment and treatment.      JULIANNE Blackmon/SATYA

## 2021-09-15 NOTE — PLAN OF CARE
Problem: Pain:  Goal: Pain level will decrease  Description: Pain level will decrease  9/15/2021 0057 by Robin Palomino RN  Outcome: Ongoing  9/14/2021 1731 by June Chadwick RN  Outcome: Ongoing  Goal: Control of acute pain  Description: Control of acute pain  Outcome: Ongoing  Goal: Control of chronic pain  Description: Control of chronic pain  Outcome: Ongoing  Goal: Patient's pain/discomfort is manageable  Description: Patient's pain/discomfort is manageable  Outcome: Ongoing     Problem: Safety:  Goal: Free from accidental physical injury  Description: Free from accidental physical injury  Outcome: Ongoing  Goal: Free from intentional harm  Description: Free from intentional harm  Outcome: Ongoing     Problem: Daily Care:  Goal: Daily care needs are met  Description: Daily care needs are met  Outcome: Ongoing

## 2021-09-15 NOTE — DISCHARGE SUMMARY
Hospital Discharge Summary     NAME: Chyna Madsen    :  2000    MRN:  [id number]    Admission Details:     Admit date:  2021    Admitting Physician:  Jerome Reveles MD  Primary Care Physician:  Fiona Miller MD    Discharge Details:     Discharge date:     Discharge Condition: stable/good    Discharge Diagnoses:    Patient Active Problem List   Diagnosis    [facility] crisis [facility]    Asthma    Sickle cell crisis ([facility]    Sepsis ([facility]    Opiate overdose ([facility]    Opiate antagonist poisoning, accidental or unintentional, initial encounter    Leukocytosis    Hypoxia    Anemia    Other chest pain    Pneumonia due to infectious organism    Fever    Chronic prescription opiate use       Hospital Course:       Sickle Cell   - Re-current admits due to non compliance. - IV fluids - stop today   - PRBC 9-12; refusing red cell exchanges - Hgb improved to 9.5   - Hydrea 1000 BID   - Folate 2 mg daily  - Check ferritin and hgb electrophoresis labs   - getting a midline for access - dc today      Thrombocytosis   - secondary to auto splenectomy  - plt clumping      Pain   - Oxycontin 20 BID   - Oxy IR PRN   - Toradol PRN   - IV Dilaudid 1 mg every 4 hours- to stop tomorrow at 9 am - patient aware of expiration time of IV narcotics.  He was told this has a stop date due to urgency to transition him to Oral narcotics due to weekly admissions and concern for abuse/non-compliance                  Discharge Medications:       [facility] Medication Instructions AAO:[id number]    Printed on:09/15/21 1154   Medication Information                      albuterol sulfate HFA (PROAIR HFA) 108 (90 Base) MCG/ACT inhaler  Albuterol HFA Inhaler 90 mcg/actuation  inhaled  2 puffs prn     Active             docusate (COLACE, DULCOLAX) 100 MG CAPS  Take 100 mg by mouth             folic acid (FOLVITE) 1 MG tablet  Take 2 tablets by mouth daily             hydroxyurea (HYDREA) 500 MG chemo capsule  Take 1,000 mg by mouth daily              ibuprofen (ADVIL;MOTRIN) 800 MG tablet  ibuprofen 800 MG Oral Tablet  oral   prn    Active             levalbuterol (XOPENEX) 0.31 MG/3ML nebulization  Levalbuterol Nebulized    2 puffs prn     Active             oxyCODONE (OXYCONTIN) 20 MG extended release tablet  TAKE 1 TABLET BY MOUTH EVERY 12 HOURS. TAKE WITH ENOUGH WATER TO SWALLOW WHOLE. DO NOT CRUSH/DISSOLVE/CHEW/CUT/BREAK. oxyCODONE HCl (OXY-IR) 10 MG immediate release tablet  Take 10 mg by mouth every 4 hours as needed for Pain. penicillin v potassium (VEETID) 500 MG tablet  Take 1 tablet by mouth 4 times daily for 10 days                 Consults:  IM     Significant Diagnostic Studies/Imaging:     XR CHEST (2 VW)    Result Date: 8/25/2021  EXAMINATION: TWO XRAY VIEWS OF THE CHEST 8/25/2021 6:36 am COMPARISON: 08/08/2021 and prior HISTORY: ORDERING SYSTEM PROVIDED HISTORY: sickle cell crisis TECHNOLOGIST PROVIDED HISTORY: Reason for exam:->sickle cell crisis Reason for Exam: Chest pain; sickle cell crisis Acuity: Acute Type of Exam: Initial FINDINGS: Borderline cardiomegaly is noted. Pulmonary vascularity appears within normal limits. The lungs are clear. No pleural effusion is noted. Osseous structures demonstrate no acute abnormality     No acute process     CT THORACIC SPINE WO CONTRAST    Result Date: 8/25/2021  EXAMINATION: CT OF THE THORACIC SPINE WITHOUT CONTRAST  8/25/2021 7:53 am: TECHNIQUE: CT of the thoracic spine was performed without the administration of intravenous contrast. Multiplanar reformatted images are provided for review. Dose modulation, iterative reconstruction, and/or weight based adjustment of the mA/kV was utilized to reduce the radiation dose to as low as reasonably achievable.  COMPARISON: April 2020 HISTORY: ORDERING SYSTEM PROVIDED HISTORY: sickle cell pain, leukocytotis, eval for bony infarct TECHNOLOGIST PROVIDED HISTORY: Reason for

## 2021-09-16 NOTE — PROGRESS NOTES
Pt discharged per order. Written and verbal discharge instructions provided. IV removed without complication. Left floor in stable condition to home with all belongings.

## 2021-09-17 LAB
BLOOD BANK DISPENSE STATUS: NORMAL
BLOOD BANK DISPENSE STATUS: NORMAL
BLOOD BANK PRODUCT CODE: NORMAL
BLOOD BANK PRODUCT CODE: NORMAL
BLOOD CULTURE, ROUTINE: NORMAL
BPU ID: NORMAL
BPU ID: NORMAL
CULTURE, BLOOD 2: NORMAL
DESCRIPTION BLOOD BANK: NORMAL
DESCRIPTION BLOOD BANK: NORMAL

## 2021-09-21 LAB
HEMOGLOBIN ELECTROPHORESIS: NORMAL
HGB ELECTROPHORESIS INTERP: NORMAL

## 2021-09-27 PROCEDURE — 99285 EMERGENCY DEPT VISIT HI MDM: CPT

## 2021-09-27 PROCEDURE — 96375 TX/PRO/DX INJ NEW DRUG ADDON: CPT

## 2021-09-27 PROCEDURE — 96374 THER/PROPH/DIAG INJ IV PUSH: CPT

## 2021-09-27 ASSESSMENT — PAIN SCALES - GENERAL: PAINLEVEL_OUTOF10: 9

## 2021-09-27 ASSESSMENT — PAIN DESCRIPTION - DESCRIPTORS: DESCRIPTORS: ACHING

## 2021-09-27 ASSESSMENT — PAIN DESCRIPTION - PAIN TYPE: TYPE: ACUTE PAIN

## 2021-09-27 ASSESSMENT — PAIN DESCRIPTION - LOCATION: LOCATION: BACK

## 2021-09-28 ENCOUNTER — HOSPITAL ENCOUNTER (EMERGENCY)
Age: 21
Discharge: HOME | End: 2021-09-28
Attending: EMERGENCY MEDICINE
Payer: COMMERCIAL

## 2021-09-28 ENCOUNTER — APPOINTMENT (OUTPATIENT)
Dept: GENERAL RADIOLOGY | Age: 21
End: 2021-09-28
Payer: COMMERCIAL

## 2021-09-28 VITALS
HEIGHT: 68 IN | RESPIRATION RATE: 14 BRPM | SYSTOLIC BLOOD PRESSURE: 126 MMHG | TEMPERATURE: 98.2 F | OXYGEN SATURATION: 99 % | BODY MASS INDEX: 28.79 KG/M2 | WEIGHT: 190 LBS | HEART RATE: 66 BPM | DIASTOLIC BLOOD PRESSURE: 75 MMHG

## 2021-09-28 DIAGNOSIS — M54.89 MIDLINE BACK PAIN, UNSPECIFIED BACK LOCATION, UNSPECIFIED CHRONICITY: ICD-10-CM

## 2021-09-28 DIAGNOSIS — G89.29 OTHER CHRONIC PAIN: ICD-10-CM

## 2021-09-28 DIAGNOSIS — D57.00 SICKLE CELL ANEMIA WITH PAIN (HCC): Primary | ICD-10-CM

## 2021-09-28 DIAGNOSIS — M25.511 ACUTE PAIN OF BOTH SHOULDERS: ICD-10-CM

## 2021-09-28 DIAGNOSIS — M25.512 ACUTE PAIN OF BOTH SHOULDERS: ICD-10-CM

## 2021-09-28 LAB
A/G RATIO: 1.4 (ref 1.1–2.2)
ALBUMIN SERPL-MCNC: 4.7 G/DL (ref 3.4–5)
ALP BLD-CCNC: 130 U/L (ref 40–129)
ALT SERPL-CCNC: 39 U/L (ref 10–40)
ANION GAP SERPL CALCULATED.3IONS-SCNC: 10 MMOL/L (ref 3–16)
ANISOCYTOSIS: ABNORMAL
AST SERPL-CCNC: 34 U/L (ref 15–37)
ATYPICAL LYMPHOCYTE RELATIVE PERCENT: 18 % (ref 0–6)
BANDED NEUTROPHILS RELATIVE PERCENT: 1 % (ref 0–7)
BASOPHILS ABSOLUTE: 0.2 K/UL (ref 0–0.2)
BASOPHILS RELATIVE PERCENT: 1 %
BILIRUB SERPL-MCNC: 4.3 MG/DL (ref 0–1)
BUN BLDV-MCNC: 11 MG/DL (ref 7–20)
CALCIUM SERPL-MCNC: 9.9 MG/DL (ref 8.3–10.6)
CHLORIDE BLD-SCNC: 106 MMOL/L (ref 99–110)
CO2: 25 MMOL/L (ref 21–32)
CREAT SERPL-MCNC: 0.7 MG/DL (ref 0.9–1.3)
EOSINOPHILS ABSOLUTE: 0 K/UL (ref 0–0.6)
EOSINOPHILS RELATIVE PERCENT: 0 %
GFR AFRICAN AMERICAN: >60
GFR NON-AFRICAN AMERICAN: >60
GLOBULIN: 3.4 G/DL
GLUCOSE BLD-MCNC: 97 MG/DL (ref 70–99)
HAPTOGLOBIN: <10 MG/DL (ref 30–200)
HCT VFR BLD CALC: 29.6 % (ref 40.5–52.5)
HEMATOLOGY PATH CONSULT: NO
HEMOGLOBIN: 10.1 G/DL (ref 13.5–17.5)
HOWELL-JOLLY BODIES: ABNORMAL
IMMATURE RETIC FRACT: 0.66 (ref 0.21–0.37)
INR BLD: 1.26 (ref 0.88–1.12)
LYMPHOCYTES ABSOLUTE: 6.1 K/UL (ref 1–5.1)
LYMPHOCYTES RELATIVE PERCENT: 10 %
MACROCYTES: ABNORMAL
MCH RBC QN AUTO: 34.2 PG (ref 26–34)
MCHC RBC AUTO-ENTMCNC: 34.1 G/DL (ref 31–36)
MCV RBC AUTO: 100.2 FL (ref 80–100)
METAMYELOCYTES RELATIVE PERCENT: 2 %
MICROCYTES: ABNORMAL
MONOCYTES ABSOLUTE: 1.5 K/UL (ref 0–1.3)
MONOCYTES RELATIVE PERCENT: 7 %
NEUTROPHILS ABSOLUTE: 14 K/UL (ref 1.7–7.7)
NEUTROPHILS RELATIVE PERCENT: 59 %
NUCLEATED RED BLOOD CELLS: 2 /100 WBC
OVALOCYTES: ABNORMAL
PDW BLD-RTO: 24.6 % (ref 12.4–15.4)
PLATELET # BLD: 559 K/UL (ref 135–450)
PLATELET SLIDE REVIEW: ABNORMAL
PMV BLD AUTO: 7.6 FL (ref 5–10.5)
POIKILOCYTES: ABNORMAL
POLYCHROMASIA: ABNORMAL
POTASSIUM SERPL-SCNC: 4.3 MMOL/L (ref 3.5–5.1)
PROMYELOCYTES PERCENT: 2 %
PROTHROMBIN TIME: 14.4 SEC (ref 9.9–12.7)
RBC # BLD: 2.95 M/UL (ref 4.2–5.9)
RETICULOCYTE ABSOLUTE COUNT: 0.27 M/UL
RETICULOCYTE COUNT PCT: 9.08 % (ref 0.5–2.18)
SCHISTOCYTES: ABNORMAL
SICKLE CELLS: ABNORMAL
SLIDE REVIEW: ABNORMAL
SODIUM BLD-SCNC: 141 MMOL/L (ref 136–145)
TARGET CELLS: ABNORMAL
TOTAL PROTEIN: 8.1 G/DL (ref 6.4–8.2)
WBC # BLD: 21.8 K/UL (ref 4–11)

## 2021-09-28 PROCEDURE — 71045 X-RAY EXAM CHEST 1 VIEW: CPT

## 2021-09-28 PROCEDURE — 85045 AUTOMATED RETICULOCYTE COUNT: CPT

## 2021-09-28 PROCEDURE — 85025 COMPLETE CBC W/AUTO DIFF WBC: CPT

## 2021-09-28 PROCEDURE — 6360000002 HC RX W HCPCS: Performed by: EMERGENCY MEDICINE

## 2021-09-28 PROCEDURE — 6370000000 HC RX 637 (ALT 250 FOR IP): Performed by: EMERGENCY MEDICINE

## 2021-09-28 PROCEDURE — 80053 COMPREHEN METABOLIC PANEL: CPT

## 2021-09-28 PROCEDURE — 96374 THER/PROPH/DIAG INJ IV PUSH: CPT

## 2021-09-28 PROCEDURE — 83010 ASSAY OF HAPTOGLOBIN QUANT: CPT

## 2021-09-28 PROCEDURE — 85610 PROTHROMBIN TIME: CPT

## 2021-09-28 PROCEDURE — 96375 TX/PRO/DX INJ NEW DRUG ADDON: CPT

## 2021-09-28 PROCEDURE — 2580000003 HC RX 258: Performed by: EMERGENCY MEDICINE

## 2021-09-28 RX ORDER — OXYCODONE HYDROCHLORIDE 5 MG/1
10 TABLET ORAL ONCE
Status: COMPLETED | OUTPATIENT
Start: 2021-09-28 | End: 2021-09-28

## 2021-09-28 RX ORDER — OXYCODONE HYDROCHLORIDE 5 MG/1
20 TABLET ORAL ONCE
Status: COMPLETED | OUTPATIENT
Start: 2021-09-28 | End: 2021-09-28

## 2021-09-28 RX ORDER — 0.9 % SODIUM CHLORIDE 0.9 %
1000 INTRAVENOUS SOLUTION INTRAVENOUS ONCE
Status: COMPLETED | OUTPATIENT
Start: 2021-09-28 | End: 2021-09-28

## 2021-09-28 RX ORDER — KETOROLAC TROMETHAMINE 30 MG/ML
30 INJECTION, SOLUTION INTRAMUSCULAR; INTRAVENOUS ONCE
Status: COMPLETED | OUTPATIENT
Start: 2021-09-28 | End: 2021-09-28

## 2021-09-28 RX ORDER — ONDANSETRON 2 MG/ML
4 INJECTION INTRAMUSCULAR; INTRAVENOUS ONCE
Status: COMPLETED | OUTPATIENT
Start: 2021-09-28 | End: 2021-09-28

## 2021-09-28 RX ADMIN — ONDANSETRON 4 MG: 2 INJECTION INTRAMUSCULAR; INTRAVENOUS at 02:44

## 2021-09-28 RX ADMIN — HYDROMORPHONE HYDROCHLORIDE 1 MG: 1 INJECTION, SOLUTION INTRAMUSCULAR; INTRAVENOUS; SUBCUTANEOUS at 02:44

## 2021-09-28 RX ADMIN — OXYCODONE 10 MG: 5 TABLET ORAL at 05:14

## 2021-09-28 RX ADMIN — KETOROLAC TROMETHAMINE 30 MG: 30 INJECTION, SOLUTION INTRAMUSCULAR at 05:14

## 2021-09-28 RX ADMIN — SODIUM CHLORIDE 1000 ML: 9 INJECTION, SOLUTION INTRAVENOUS at 05:14

## 2021-09-28 RX ADMIN — OXYCODONE 20 MG: 5 TABLET ORAL at 02:44

## 2021-09-28 ASSESSMENT — ENCOUNTER SYMPTOMS
VOMITING: 0
BACK PAIN: 1
COUGH: 0
SHORTNESS OF BREATH: 0
CHEST TIGHTNESS: 0
DIARRHEA: 0
NAUSEA: 0
ABDOMINAL PAIN: 0

## 2021-09-28 ASSESSMENT — PAIN SCALES - GENERAL
PAINLEVEL_OUTOF10: 9
PAINLEVEL_OUTOF10: 9
PAINLEVEL_OUTOF10: 8
PAINLEVEL_OUTOF10: 9

## 2021-09-28 NOTE — ED PROVIDER NOTES
[facility]  ED  EMERGENCY DEPARTMENT ENCOUNTER        Pt Name: Kang Chong  MRN: [id number]  [place]fsara 2000  Date of evaluation: 9/27/2021  Provider: Benito Kehr, MD  PCP: Rafiq Hull MD      CHIEF COMPLAINT       Chief Complaint   Patient presents with    Sickle Cell Pain Crisis     back and shoulders started Saturday       HISTORY OFPRESENT ILLNESS   (Location/Symptom, Timing/Onset, Context/Setting, Quality, Duration, Modifying Factors,Severity)  Note limiting factors. Kang Chong is a 24 y.o. male presenting today due to concern for increasing diffuse back and bilateral shoulder pain starting 3 days ago that has progressively worsened similar to prior episodes with his sickle cell disease. He is on oxycodone at home and states he is taking it as prescribed at 20 mg every 4 hours but that is not currently helping with the pain. He denies any chest pain or shortness of breath. No fever. No headache or neck pain. No falls or trauma. No abdominal pain. No vomiting or diarrhea. No urinary complaints. No unilateral numbness or weakness. No other joint pains besides to the back and shoulders. He came by EMS for further evaluation since nothing was helping for the pain at home. REVIEW OF SYSTEMS    (2-9 systems for level 4, 10 or more for level 5)     Review of Systems   Constitutional: Positive for fatigue. Negative for chills, diaphoresis and fever. HENT: Negative for congestion. Respiratory: Negative for cough, chest tightness and shortness of breath. Cardiovascular: Negative for chest pain. Gastrointestinal: Negative for abdominal pain, diarrhea, nausea and vomiting. Genitourinary: Negative for dysuria, flank pain, penile pain and penile swelling. Musculoskeletal: Positive for back pain (diffuse, denies any focal area). Negative for neck pain and neck stiffness. Skin: Negative for wound.    Neurological: Positive for weakness (generalized, denies any focal concerns). Negative for light-headedness and headaches. Psychiatric/Behavioral: Negative for confusion. Positives and Pertinent negatives as per HPI. PASTMEDICAL HISTORY     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)     Sickle cell anemia (HCC)          SURGICAL HISTORY       Past Surgical History:   Procedure Laterality Date    SPLENECTOMY           CURRENT MEDICATIONS       Current Discharge Medication List      CONTINUE these medications which have NOT CHANGED    Details   folic acid (FOLVITE) 1 MG tablet Take 2 tablets by mouth daily  Qty: 30 tablet, Refills: 3      oxyCODONE HCl (OXY-IR) 10 MG immediate release tablet Take 10 mg by mouth every 4 hours as needed for Pain. albuterol sulfate HFA (PROAIR HFA) 108 (90 Base) MCG/ACT inhaler Albuterol HFA Inhaler 90 mcg/actuation  inhaled  2 puffs prn     Active      levalbuterol (XOPENEX) 0.31 MG/3ML nebulization Levalbuterol Nebulized    2 puffs prn     Active      docusate (COLACE, DULCOLAX) 100 MG CAPS Take 100 mg by mouth      ibuprofen (ADVIL;MOTRIN) 800 MG tablet ibuprofen 800 MG Oral Tablet  oral   prn    Active      oxyCODONE (OXYCONTIN) 20 MG extended release tablet TAKE 1 TABLET BY MOUTH EVERY 12 HOURS.  TAKE WITH ENOUGH WATER TO SWALLOW WHOLE. DO NOT CRUSH/DISSOLVE/CHEW/CUT/BREAK.      hydroxyurea (HYDREA) 500 MG chemo capsule Take 1,000 mg by mouth daily   Qty:               ALLERGIES     Morphine    FAMILY HISTORY       Family History   Problem Relation Age of Onset    Other Father         sarcoidosis          SOCIAL HISTORY       Social History     Socioeconomic History    Marital status: Single     Spouse name: None    Number of children: 0    Years of education: None    Highest education level: None   Occupational History    None   Tobacco Use    Smoking status: Never Smoker    Smokeless tobacco: Never Used Vaping Use    Vaping Use: Never used   Substance and Sexual Activity    Alcohol use: Not Currently    Drug use: Never    Sexual activity: Not Currently   Other Topics Concern    None   Social History Narrative    None     Social Determinants of Health     Financial Resource Strain:     Difficulty of Paying Living Expenses:    Food Insecurity:     Worried About Running Out of Food in the Last Year:     Ran Out of Food in the Last Year:    Transportation Needs:     Lack of Transportation (Medical):  Lack of Transportation (Non-Medical):    Physical Activity:     Days of Exercise per Week:     Minutes of Exercise per Session:    Stress:     Feeling of Stress :    Social Connections:     Frequency of Communication with Friends and Family:     Frequency of Social Gatherings with Friends and Family:     Attends [employer] Services:     Active Member of Clubs or Organizations:     Attends Club or Organization Meetings:     Marital Status:    Intimate Partner Violence:     Fear of Current or Ex-Partner:     Emotionally Abused:     Physically Abused:     Sexually Abused:        SCREENINGS                PHYSICAL EXAM    (up to 7 for level 4, 8 or more for level 5)     ED Triage Vitals [09/27/21 2355]   BP Temp Temp Source Pulse Resp SpO2 Height Weight   135/75 98.2 °F (36.8 °C) Temporal 83 18 97 % 5' 8\" (1.727 m) 190 lb (86.2 kg)       Physical Exam  Vitals and nursing note reviewed. Constitutional:       General: He is awake. He is not in acute distress. Appearance: Normal appearance. He is well-developed, well-groomed and overweight. He is not ill-appearing, toxic-appearing or diaphoretic. Interventions: He is not intubated. HENT:      Head: Normocephalic and atraumatic. Right Ear: External ear normal.      Left Ear: External ear normal.      Nose: Nose normal.      Mouth/Throat:      Pharynx: No oropharyngeal exudate. Eyes:      General: No scleral icterus.         Right eye: No discharge. Left eye: No discharge. Pupils: Pupils are equal, round, and reactive to light. Neck:      Trachea: Trachea normal. No tracheal deviation. Cardiovascular:      Rate and Rhythm: Normal rate and regular rhythm. Pulses: Normal pulses. Radial pulses are 2+ on the right side and 2+ on the left side. Pulmonary:      Effort: Pulmonary effort is normal. No tachypnea, bradypnea, accessory muscle usage, prolonged expiration, respiratory distress or retractions. He is not intubated. Breath sounds: Normal breath sounds and air entry. No stridor, decreased air movement or transmitted upper airway sounds. No decreased breath sounds, wheezing, rhonchi or rales. Chest:      Chest wall: No tenderness. Abdominal:      General: Bowel sounds are normal. There is no distension. Palpations: Abdomen is soft. Tenderness: There is no abdominal tenderness. There is no guarding or rebound. Musculoskeletal:         General: No swelling, tenderness, deformity or signs of injury. Normal range of motion. Cervical back: Normal, full passive range of motion without pain, normal range of motion and neck supple. No swelling, edema, deformity, erythema, signs of trauma, lacerations, rigidity, spasms, torticollis, tenderness, bony tenderness or crepitus. No pain with movement. Normal range of motion. Thoracic back: No swelling, deformity, signs of trauma, lacerations, spasms, tenderness or bony tenderness. Normal range of motion. No scoliosis. Lumbar back: No swelling, edema, deformity, signs of trauma, lacerations, spasms, tenderness or bony tenderness. Back:       Right lower leg: No edema. Left lower leg: No edema.       Comments: MSK: Normal range of motion of bilateral shoulders, elbows, wrists, hips, knees, ankles and nontender to palpation of all joints including shoulders although he does report pain when he tries to lift both shoulders over his head Skin:     General: Skin is warm and dry. Coloration: Skin is not ashen, cyanotic, jaundiced or pale. Findings: No abrasion, bruising, ecchymosis, erythema, lesion or rash. Neurological:      General: No focal deficit present. Mental Status: He is alert and oriented to person, place, and time. Mental status is at baseline. GCS: GCS eye subscore is 4. GCS verbal subscore is 5. GCS motor subscore is 6. Cranial Nerves: No dysarthria. Sensory: Sensation is intact. No sensory deficit. Motor: Motor function is intact. No weakness, tremor, atrophy, abnormal muscle tone or seizure activity. Psychiatric:         Attention and Perception: Attention normal.         Mood and Affect: Affect normal. Mood is depressed. Mood is not anxious. Speech: Speech normal. Speech is not slurred. Behavior: Behavior normal. Behavior is cooperative.              DIAGNOSTIC RESULTS   :    Labs Reviewed   CBC WITH AUTO DIFFERENTIAL - Abnormal; Notable for the following components:       Result Value    WBC 21.8 (*)     RBC 2.95 (*)     Hemoglobin 10.1 (*)     Hematocrit 29.6 (*)     .2 (*)     MCH 34.2 (*)     RDW 24.6 (*)     Platelets 741 (*)     Neutrophils Absolute 14.0 (*)     Lymphocytes Absolute 6.1 (*)     Monocytes Absolute 1.5 (*)     Atypical Lymphocytes Relative 18 (*)     Metamyelocytes Relative 2 (*)     Promyelocytes Percent 2 (*)     nRBC 2 (*)     Anisocytosis 2+ (*)     Macrocytes 1+ (*)     Microcytes Occasional (*)     Polychromasia Occasional (*)     Poikilocytes 2+ (*)     Schistocytes Occasional (*)     Ovalocytes Occasional (*)     Target Cells Occasional (*)     Conteh-Jolly Bodies Occasional (*)     Sickle Cells 2+ (*)     All other components within normal limits    Narrative:     Performed at:  [facility]) - [address],  [place], [address] [place] [address] [place]   Phone [phone]   COMPREHENSIVE METABOLIC PANEL - Abnormal; Notable for recommends discharge. I did inform the patient that oncology was declining admission at this point and recommended that he call the office later this morning for any other concerns related to his pain. IP CONSULT TO ONCOLOGY    EMERGENCY DEPARTMENT COURSE and DIFFERENTIAL DIAGNOSIS/MDM:   Vitals:    Vitals:    09/27/21 2355 09/28/21 0249 09/28/21 0327 09/28/21 0516   BP: 135/75 106/61 (!) 94/56 113/65   Pulse: 83 70 65 69   Resp: 18 14 14 14   Temp: 98.2 °F (36.8 °C)      TempSrc: Temporal      SpO2: 97% 96% 96% 97%   Weight: 190 lb (86.2 kg)      Height: 5' 8\" (1.727 m)          Patient was given the following medications:  Medications   0.9 % sodium chloride bolus (1,000 mLs IntraVENous New Bag 9/28/21 0514)   oxyCODONE (ROXICODONE) immediate release tablet 20 mg (20 mg Oral Given 9/28/21 0244)   HYDROmorphone (DILAUDID) injection 1 mg (1 mg IntraVENous Given 9/28/21 0244)   ondansetron (ZOFRAN) injection 4 mg (4 mg IntraVENous Given 9/28/21 0244)   ketorolac (TORADOL) injection 30 mg (30 mg IntraVENous Given 9/28/21 0514)   oxyCODONE (ROXICODONE) immediate release tablet 10 mg (10 mg Oral Given 9/28/21 0514)       Patient was evaluated due to having worsening pain over the last few days associated with his diffuse back and bilateral shoulders. He had normal range of motion of both shoulders no obvious tenderness to palpation during my evaluation. He also reported diffuse back pain although had no obvious focal tenderness to palpation and he had normal strength and sensation in both legs and at this time I have low suspicion for cauda equina syndrome or other significant spinal pathology. I will plan to consult hematology for further recommendations related to his pain. Hematology recommended discharge and feel that he is misusing his pain medications. Repeat vitals were stable.   Hematology did want him to return to the ED if he does develop a fever but otherwise he normally gets his pain medications filled on Thursday per hematology and they feel that he is most likely run out of his medicines and that he is not taking them as he is supposed to. I did tell the patient that if pain continues to worsen throughout the day, he can always return to the emergency department for further evaluation, but at this point with reassuring vitals and initial reassuring exam, no need for admission at this point and hematology recommends he be discharged at this point. The patient tolerated their visit well. The patient and / or the family were informed of the results of any tests, a time was given to answer questions. FINAL IMPRESSION      1. Sickle cell anemia with pain (HCC)    2. Other chronic pain    3. Midline back pain, unspecified back location, unspecified chronicity    4.  Acute pain of both shoulders          DISPOSITION/PLAN   DISPOSITION Decision To Discharge 09/28/2021 05:21:53 AM      PATIENT REFERRED TO:  [facility]  ED  [address]  Go to   If symptoms worsen    Jigna Pemberton MD  [address] [place] [address]  [phone]    Call today  As needed      DISCHARGEMEDICATIONS:  Current Discharge Medication List          DISCONTINUED MEDICATIONS:  Current Discharge Medication List                 (Please note that portions of this note were completed with a voicerecognition program.  Efforts were made to edit the dictations but occasionally words are mis-transcribed.)    Alivia Dillard MD (electronically signed)            Alivia Dillard MD  09/28/21 6629

## 2021-10-12 ENCOUNTER — HOSPITAL ENCOUNTER (EMERGENCY)
Age: 21
Discharge: HOME | End: 2021-10-12
Attending: EMERGENCY MEDICINE
Payer: COMMERCIAL

## 2021-10-12 VITALS
HEART RATE: 71 BPM | SYSTOLIC BLOOD PRESSURE: 100 MMHG | TEMPERATURE: 98.7 F | HEIGHT: 68 IN | RESPIRATION RATE: 18 BRPM | OXYGEN SATURATION: 100 % | BODY MASS INDEX: 27.28 KG/M2 | DIASTOLIC BLOOD PRESSURE: 60 MMHG | WEIGHT: 180 LBS

## 2021-10-12 DIAGNOSIS — D57.00 SICKLE CELL PAIN CRISIS (HCC): Primary | ICD-10-CM

## 2021-10-12 LAB
ANION GAP SERPL CALCULATED.3IONS-SCNC: 8 MMOL/L (ref 3–16)
ANISOCYTOSIS: ABNORMAL
BASOPHILS ABSOLUTE: 0.2 K/UL (ref 0–0.2)
BASOPHILS RELATIVE PERCENT: 0.9 %
BUN BLDV-MCNC: 8 MG/DL (ref 7–20)
CALCIUM SERPL-MCNC: 9.5 MG/DL (ref 8.3–10.6)
CHLORIDE BLD-SCNC: 105 MMOL/L (ref 99–110)
CO2: 26 MMOL/L (ref 21–32)
CREAT SERPL-MCNC: 0.7 MG/DL (ref 0.9–1.3)
EOSINOPHILS ABSOLUTE: 0.1 K/UL (ref 0–0.6)
EOSINOPHILS RELATIVE PERCENT: 0.5 %
GFR AFRICAN AMERICAN: >60
GFR NON-AFRICAN AMERICAN: >60
GLUCOSE BLD-MCNC: 111 MG/DL (ref 70–99)
HCT VFR BLD CALC: 26.1 % (ref 40.5–52.5)
HEMATOLOGY PATH CONSULT: NO
HEMOGLOBIN: 9.1 G/DL (ref 13.5–17.5)
HOWELL-JOLLY BODIES: ABNORMAL
IMMATURE RETIC FRACT: 0.69 (ref 0.21–0.37)
LYMPHOCYTES ABSOLUTE: 2.1 K/UL (ref 1–5.1)
LYMPHOCYTES RELATIVE PERCENT: 12 %
MACROCYTES: ABNORMAL
MCH RBC QN AUTO: 36.5 PG (ref 26–34)
MCHC RBC AUTO-ENTMCNC: 35 G/DL (ref 31–36)
MCV RBC AUTO: 104.2 FL (ref 80–100)
MONOCYTES ABSOLUTE: 1.3 K/UL (ref 0–1.3)
MONOCYTES RELATIVE PERCENT: 7.1 %
NEUTROPHILS ABSOLUTE: 14.1 K/UL (ref 1.7–7.7)
NEUTROPHILS RELATIVE PERCENT: 79.5 %
OVALOCYTES: ABNORMAL
PDW BLD-RTO: 24.6 % (ref 12.4–15.4)
PLATELET # BLD: 426 K/UL (ref 135–450)
PLATELET SLIDE REVIEW: ADEQUATE
PMV BLD AUTO: 7.6 FL (ref 5–10.5)
POIKILOCYTES: ABNORMAL
POLYCHROMASIA: ABNORMAL
POTASSIUM REFLEX MAGNESIUM: 3.7 MMOL/L (ref 3.5–5.1)
RBC # BLD: 2.51 M/UL (ref 4.2–5.9)
RETICULOCYTE ABSOLUTE COUNT: 0.15 M/UL
RETICULOCYTE COUNT PCT: 6.01 % (ref 0.5–2.18)
SICKLE CELLS: ABNORMAL
SLIDE REVIEW: ABNORMAL
SODIUM BLD-SCNC: 139 MMOL/L (ref 136–145)
TARGET CELLS: ABNORMAL
WBC # BLD: 17.8 K/UL (ref 4–11)

## 2021-10-12 PROCEDURE — 85025 COMPLETE CBC W/AUTO DIFF WBC: CPT

## 2021-10-12 PROCEDURE — 80048 BASIC METABOLIC PNL TOTAL CA: CPT

## 2021-10-12 PROCEDURE — 96375 TX/PRO/DX INJ NEW DRUG ADDON: CPT

## 2021-10-12 PROCEDURE — 85045 AUTOMATED RETICULOCYTE COUNT: CPT

## 2021-10-12 PROCEDURE — 2580000003 HC RX 258: Performed by: EMERGENCY MEDICINE

## 2021-10-12 PROCEDURE — 6360000002 HC RX W HCPCS: Performed by: EMERGENCY MEDICINE

## 2021-10-12 PROCEDURE — 96374 THER/PROPH/DIAG INJ IV PUSH: CPT

## 2021-10-12 PROCEDURE — 99283 EMERGENCY DEPT VISIT LOW MDM: CPT

## 2021-10-12 PROCEDURE — 96376 TX/PRO/DX INJ SAME DRUG ADON: CPT

## 2021-10-12 RX ORDER — 0.9 % SODIUM CHLORIDE 0.9 %
1000 INTRAVENOUS SOLUTION INTRAVENOUS ONCE
Status: COMPLETED | OUTPATIENT
Start: 2021-10-12 | End: 2021-10-12

## 2021-10-12 RX ORDER — KETOROLAC TROMETHAMINE 30 MG/ML
15 INJECTION, SOLUTION INTRAMUSCULAR; INTRAVENOUS ONCE
Status: COMPLETED | OUTPATIENT
Start: 2021-10-12 | End: 2021-10-12

## 2021-10-12 RX ADMIN — SODIUM CHLORIDE 1000 ML: 9 INJECTION, SOLUTION INTRAVENOUS at 03:36

## 2021-10-12 RX ADMIN — HYDROMORPHONE HYDROCHLORIDE 0.5 MG: 1 INJECTION, SOLUTION INTRAMUSCULAR; INTRAVENOUS; SUBCUTANEOUS at 03:37

## 2021-10-12 RX ADMIN — HYDROMORPHONE HYDROCHLORIDE 0.5 MG: 1 INJECTION, SOLUTION INTRAMUSCULAR; INTRAVENOUS; SUBCUTANEOUS at 04:43

## 2021-10-12 RX ADMIN — HYDROMORPHONE HYDROCHLORIDE 0.5 MG: 1 INJECTION, SOLUTION INTRAMUSCULAR; INTRAVENOUS; SUBCUTANEOUS at 04:09

## 2021-10-12 RX ADMIN — KETOROLAC TROMETHAMINE 15 MG: 30 INJECTION, SOLUTION INTRAMUSCULAR at 03:37

## 2021-10-12 ASSESSMENT — PAIN DESCRIPTION - PAIN TYPE: TYPE: ACUTE PAIN

## 2021-10-12 ASSESSMENT — PAIN DESCRIPTION - LOCATION: LOCATION: GENERALIZED

## 2021-10-12 ASSESSMENT — PAIN SCALES - GENERAL
PAINLEVEL_OUTOF10: 9
PAINLEVEL_OUTOF10: 8
PAINLEVEL_OUTOF10: 9

## 2021-10-12 NOTE — ED PROVIDER NOTES
[facility]  ED  [place]      Pt Name: Raisa Johnson  MRN: [id number]  [place] 2000  Date of evaluation: 10/12/2021  Provider: Yariel Antunez MD    CHIEF COMPLAINT       Chief Complaint   Patient presents with    Sickle Cell Pain Crisis     started tonight         HISTORY OF PRESENT ILLNESS   (Location/Symptom, Timing/Onset, Context/Setting, Quality, Duration, Modifying Factors, Severity)  Note limiting factors. Raisa Johnson is a 24 y.o. male with past medical history of asthma, sickle cell disease with chronic narcotic use here today for a pain crisis. Patient states he went to bed feeling fine. Woke up in the middle of the night with an intense aching severe pain in the bilateral knees. No trauma or injury. No redness or swelling. No pain in any other joints. No fevers or chills. No chest pain or shortness of breath. Pain was uncontrolled with his home oxycodone    HPI    Nursing Notes were reviewed. REVIEW OF SYSTEMS    (2-9 systems for level 4, 10 or more for level 5)     Review of Systems    Please see HPI for pertinent positive and negative review of system findings. A full 10 system ROS was performed and otherwise negative.         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)     Sickle cell anemia (HCC)          SURGICAL HISTORY       Past Surgical History:   Procedure Laterality Date    SPLENECTOMY           CURRENT MEDICATIONS       Previous Medications    ALBUTEROL SULFATE HFA (PROAIR HFA) 108 (90 BASE) MCG/ACT INHALER    Albuterol HFA Inhaler 90 mcg/actuation  inhaled  2 puffs prn     Active    DOCUSATE (COLACE, DULCOLAX) 100 MG CAPS    Take 100 mg by mouth    FOLIC ACID (FOLVITE) 1 MG TABLET    Take 2 tablets by mouth daily    HYDROXYUREA (HYDREA) 500 MG CHEMO CAPSULE    Take 1,000 mg by mouth daily     IBUPROFEN (ADVIL;MOTRIN) 800 MG TABLET    ibuprofen 800 MG Oral Tablet  oral   prn    Active    LEVALBUTEROL (XOPENEX) 0.31 MG/3ML NEBULIZATION    Levalbuterol Nebulized    2 puffs prn     Active    OXYCODONE (OXYCONTIN) 20 MG EXTENDED RELEASE TABLET    TAKE 1 TABLET BY MOUTH EVERY 12 HOURS. TAKE WITH ENOUGH WATER TO SWALLOW WHOLE. DO NOT CRUSH/DISSOLVE/CHEW/CUT/BREAK. OXYCODONE HCL (OXY-IR) 10 MG IMMEDIATE RELEASE TABLET    Take 10 mg by mouth every 4 hours as needed for Pain. ALLERGIES     Morphine    FAMILY HISTORY       Family History   Problem Relation Age of Onset    Other Father         sarcoidosis          SOCIAL HISTORY       Social History     Socioeconomic History    Marital status: Single     Spouse name: None    Number of children: 0    Years of education: None    Highest education level: None   Occupational History    None   Tobacco Use    Smoking status: Never Smoker    Smokeless tobacco: Never Used   Vaping Use    Vaping Use: Never used   Substance and Sexual Activity    Alcohol use: Not Currently    Drug use: Never    Sexual activity: Not Currently   Other Topics Concern    None   Social History Narrative    None     Social Determinants of Health     Financial Resource Strain:     Difficulty of Paying Living Expenses:    Food Insecurity:     Worried About Running Out of Food in the Last Year:     Ran Out of Food in the Last Year:    Transportation Needs:     Lack of Transportation (Medical):      Lack of Transportation (Non-Medical):    Physical Activity:     Days of Exercise per Week:     Minutes of Exercise per Session:    Stress:     Feeling of Stress :    Social Connections:     Frequency of Communication with Friends and Family:     Frequency of Social Gatherings with Friends and Family:     Attends Alevism Services:     Active Member of Clubs or Organizations:     Attends Club or Organization Meetings:     Marital Status:    Intimate Partner Violence:     Fear of Current or Ex-Partner:     Emotionally Abused:     Physically Abused:     Sexually Abused:        SCREENINGS               PHYSICAL EXAM    (up to 7 for level 4, 8 or more for level 5)     ED Triage Vitals [10/12/21 0254]   BP Temp Temp Source Pulse Resp SpO2 Height Weight   131/75 98.7 °F (37.1 °C) Oral 77 14 98 % 5' 8\" (1.727 m) 180 lb (81.6 kg)       Physical Exam    General appearance:  Cooperative. No acute distress. Skin:  Warm. Dry. Eye:  Extraocular movements intact. Ears, nose, mouth and throat:  Oral mucosa moist,  Neck:  Trachea midline. Heart:  Regular rate and rhythm  Perfusion:  intact  Respiratory:  Lungs clear to auscultation bilaterally. Respirations nonlabored. Abdominal:   Non distended. Nontender  Neurological:  Alert and oriented x 3. Moves all extremities spontaneously  Musculoskeletal:   Normal ROM, no deformities. Normal flexion and extension of the bilateral hips, knees and ankles. No erythema, warmth or edema to the lower extremities or lower extremity joints. No effusion to the knee joints.           Psychiatric:  Normal mood      DIAGNOSTIC RESULTS       Labs Reviewed   CBC WITH AUTO DIFFERENTIAL - Abnormal; Notable for the following components:       Result Value    WBC 17.8 (*)     RBC 2.51 (*)     Hemoglobin 9.1 (*)     Hematocrit 26.1 (*)     .2 (*)     MCH 36.5 (*)     RDW 24.6 (*)     Neutrophils Absolute 14.1 (*)     Anisocytosis 2+ (*)     Macrocytes 1+ (*)     Polychromasia 1+ (*)     Poikilocytes 2+ (*)     Ovalocytes Occasional (*)     Target Cells Occasional (*)     Conteh-Jolly Bodies Occasional (*)     Sickle Cells 1+ (*)     All other components within normal limits    Narrative:     Performed at:  [facility]) Located within [facility]  [address] [place] [address],  [place], [facility] [employer]   Phone [phone]   BASIC METABOLIC PANEL W/ REFLEX TO MG FOR LOW K - Abnormal; Notable for the following components:    Glucose 111 (*)     CREATININE 0.7 (*) All other components within normal limits    Narrative:     Performed at:  [facility]  [address],  [place], [facility] [employer]   Phone [phone]   RETICULOCYTES - Abnormal; Notable for the following components:    Retic Ct Pct 6.01 (*)     Immature Retic Fract 0.69 (*)     All other components within normal limits    Narrative:     Performed at:  [address],  [place], [facility] [employer]   Phone [phone]       Interpretation per the Radiologist below, if obtained/available at the time of this note:    No orders to display       All other labs/imaging were within normal range or not returned as of this dictation. EMERGENCY DEPARTMENT COURSE and DIFFERENTIAL DIAGNOSIS/MDM:   Vitals:    Vitals:    10/12/21 0254 10/12/21 0411   BP: 131/75 111/66   Pulse: 77 72   Resp: 14 18   Temp: 98.7 °F (37.1 °C)    TempSrc: Oral    SpO2: 98% 98%   Weight: 180 lb (81.6 kg)    Height: 5' 8\" (1.727 m)        Patient presents emergency department today complaining of bilateral knee pain. No trauma or injury. No erythema or warmth. No concern for infected joint. Longstanding history of multiple frequent visits to the emergency department for her pain complaints highly suspicious for opioid seeking behavior. Laboratory studies performed show no acute changes. No significant concern for aplastic crisis. Minimally elevated reticulocyte count. No concern for infected joint or acute traumatic injury. Patient given IV fluids multiple rounds of pain medication with both NSAIDs and narcotics. Continues to have discomfort but appears quite well overall. Appears to be in no acute distress. Resting comfortably. Able to ambulate. Did not feel admission necessary and do feel that he can be discharged home.   He has no complaints of chest discomfort and is having no fevers    MDM    CONSULTS     None    Critical Care:   None    REASSESSMENT PROCEDURE     Unless otherwise noted below, none     Procedures      FINAL IMPRESSION      1. Sickle cell pain crisis [facility])            DISPOSITION/PLAN   DISPOSITION Decision To Discharge 10/12/2021 05:08:16 AM        PATIENT REFERRED TO:  Tracey Rock MD  [address] [facility] [address]  [phone]    Schedule an appointment as soon as possible for a visit         DISCHARGE MEDICATIONS:  New Prescriptions    No medications on file     Controlled Substances Monitoring:     No flowsheet data found.     (Please note that portions of this note were completed with a voice recognition program.  Efforts were made to edit the dictations but occasionally words are mis-transcribed.)    Dia Staples MD (electronically signed)  Attending Emergency Physician            Canelo Harrison MD  10/12/21 6425

## 2021-10-12 NOTE — ED NOTES
Patient requesting med to go through \"closest port\" of IV line. Educated on mechanism of medication as well as medication administration.       Leopoldo Mccune, RN  10/12/21 4289

## 2021-10-17 ENCOUNTER — APPOINTMENT (OUTPATIENT)
Dept: GENERAL RADIOLOGY | Age: 21
End: 2021-10-17
Payer: COMMERCIAL

## 2021-10-17 ENCOUNTER — HOSPITAL ENCOUNTER (OUTPATIENT)
Age: 21
Setting detail: OBSERVATION
Discharge: HOME | End: 2021-10-20
Attending: EMERGENCY MEDICINE
Payer: COMMERCIAL

## 2021-10-17 DIAGNOSIS — D57.00 SICKLE CELL CRISIS (HCC): Primary | ICD-10-CM

## 2021-10-17 PROBLEM — M25.569 KNEE PAIN: Status: ACTIVE | Noted: 2021-10-17

## 2021-10-17 PROBLEM — M79.604 RIGHT LEG PAIN: Status: ACTIVE | Noted: 2021-10-17

## 2021-10-17 PROBLEM — M25.569 KNEE PAIN: Status: RESOLVED | Noted: 2021-10-17 | Resolved: 2021-10-17

## 2021-10-17 PROBLEM — M79.604 RIGHT LEG PAIN: Status: RESOLVED | Noted: 2021-10-17 | Resolved: 2021-10-17

## 2021-10-17 LAB
A/G RATIO: 1.7 (ref 1.1–2.2)
ALBUMIN SERPL-MCNC: 4.5 G/DL (ref 3.4–5)
ALP BLD-CCNC: 111 U/L (ref 40–129)
ALT SERPL-CCNC: 22 U/L (ref 10–40)
ANION GAP SERPL CALCULATED.3IONS-SCNC: 12 MMOL/L (ref 3–16)
ANISOCYTOSIS: ABNORMAL
AST SERPL-CCNC: 34 U/L (ref 15–37)
ATYPICAL LYMPHOCYTE RELATIVE PERCENT: 6 % (ref 0–6)
BASOPHILS ABSOLUTE: 0 K/UL (ref 0–0.2)
BASOPHILS RELATIVE PERCENT: 0 %
BILIRUB SERPL-MCNC: 4.1 MG/DL (ref 0–1)
BUN BLDV-MCNC: 7 MG/DL (ref 7–20)
CALCIUM SERPL-MCNC: 9.5 MG/DL (ref 8.3–10.6)
CHLORIDE BLD-SCNC: 103 MMOL/L (ref 99–110)
CO2: 24 MMOL/L (ref 21–32)
CREAT SERPL-MCNC: 0.8 MG/DL (ref 0.9–1.3)
EOSINOPHILS ABSOLUTE: 0 K/UL (ref 0–0.6)
EOSINOPHILS RELATIVE PERCENT: 0 %
GFR AFRICAN AMERICAN: >60
GFR NON-AFRICAN AMERICAN: >60
GLOBULIN: 2.7 G/DL
GLUCOSE BLD-MCNC: 93 MG/DL (ref 70–99)
HCT VFR BLD CALC: 25.3 % (ref 40.5–52.5)
HEMATOLOGY PATH CONSULT: NO
HEMOGLOBIN: 8.9 G/DL (ref 13.5–17.5)
HOWELL-JOLLY BODIES: ABNORMAL
IMMATURE RETIC FRACT: 0.69 (ref 0.21–0.37)
LACTIC ACID: 1.2 MMOL/L (ref 0.4–2)
LYMPHOCYTES ABSOLUTE: 5.9 K/UL (ref 1–5.1)
LYMPHOCYTES RELATIVE PERCENT: 24 %
MACROCYTES: ABNORMAL
MCH RBC QN AUTO: 35.9 PG (ref 26–34)
MCHC RBC AUTO-ENTMCNC: 34.9 G/DL (ref 31–36)
MCV RBC AUTO: 102.6 FL (ref 80–100)
MICROCYTES: ABNORMAL
MONOCYTES ABSOLUTE: 0.2 K/UL (ref 0–1.3)
MONOCYTES RELATIVE PERCENT: 1 %
NEUTROPHILS ABSOLUTE: 13.6 K/UL (ref 1.7–7.7)
NEUTROPHILS RELATIVE PERCENT: 69 %
NUCLEATED RED BLOOD CELLS: 1 /100 WBC
OVALOCYTES: ABNORMAL
PDW BLD-RTO: 22.5 % (ref 12.4–15.4)
PLATELET # BLD: 425 K/UL (ref 135–450)
PLATELET SLIDE REVIEW: ABNORMAL
PMV BLD AUTO: 7.7 FL (ref 5–10.5)
POLYCHROMASIA: ABNORMAL
POTASSIUM SERPL-SCNC: 3.9 MMOL/L (ref 3.5–5.1)
RBC # BLD: 2.47 M/UL (ref 4.2–5.9)
RETICULOCYTE ABSOLUTE COUNT: 0.2 M/UL
RETICULOCYTE COUNT PCT: 7.95 % (ref 0.5–2.18)
SICKLE CELLS: ABNORMAL
SLIDE REVIEW: ABNORMAL
SODIUM BLD-SCNC: 139 MMOL/L (ref 136–145)
SPHEROCYTES: ABNORMAL
TOTAL PROTEIN: 7.2 G/DL (ref 6.4–8.2)
WBC # BLD: 19.7 K/UL (ref 4–11)

## 2021-10-17 PROCEDURE — 87040 BLOOD CULTURE FOR BACTERIA: CPT

## 2021-10-17 PROCEDURE — 85025 COMPLETE CBC W/AUTO DIFF WBC: CPT

## 2021-10-17 PROCEDURE — 6360000002 HC RX W HCPCS

## 2021-10-17 PROCEDURE — G0378 HOSPITAL OBSERVATION PER HR: HCPCS

## 2021-10-17 PROCEDURE — 99284 EMERGENCY DEPT VISIT MOD MDM: CPT

## 2021-10-17 PROCEDURE — 96376 TX/PRO/DX INJ SAME DRUG ADON: CPT

## 2021-10-17 PROCEDURE — 2580000003 HC RX 258: Performed by: EMERGENCY MEDICINE

## 2021-10-17 PROCEDURE — 71045 X-RAY EXAM CHEST 1 VIEW: CPT

## 2021-10-17 PROCEDURE — 85045 AUTOMATED RETICULOCYTE COUNT: CPT

## 2021-10-17 PROCEDURE — 73502 X-RAY EXAM HIP UNI 2-3 VIEWS: CPT

## 2021-10-17 PROCEDURE — 6360000002 HC RX W HCPCS: Performed by: EMERGENCY MEDICINE

## 2021-10-17 PROCEDURE — 36415 COLL VENOUS BLD VENIPUNCTURE: CPT

## 2021-10-17 PROCEDURE — 96374 THER/PROPH/DIAG INJ IV PUSH: CPT

## 2021-10-17 PROCEDURE — 80053 COMPREHEN METABOLIC PANEL: CPT

## 2021-10-17 PROCEDURE — 83605 ASSAY OF LACTIC ACID: CPT

## 2021-10-17 PROCEDURE — 96361 HYDRATE IV INFUSION ADD-ON: CPT

## 2021-10-17 PROCEDURE — 96375 TX/PRO/DX INJ NEW DRUG ADDON: CPT

## 2021-10-17 PROCEDURE — 6360000002 HC RX W HCPCS: Performed by: INTERNAL MEDICINE

## 2021-10-17 PROCEDURE — 6370000000 HC RX 637 (ALT 250 FOR IP): Performed by: INTERNAL MEDICINE

## 2021-10-17 PROCEDURE — 2580000003 HC RX 258: Performed by: INTERNAL MEDICINE

## 2021-10-17 RX ORDER — SODIUM CHLORIDE 9 MG/ML
25 INJECTION, SOLUTION INTRAVENOUS PRN
Status: DISCONTINUED | OUTPATIENT
Start: 2021-10-17 | End: 2021-10-20 | Stop reason: HOSPADM

## 2021-10-17 RX ORDER — ACETAMINOPHEN 325 MG/1
650 TABLET ORAL EVERY 6 HOURS PRN
Status: DISCONTINUED | OUTPATIENT
Start: 2021-10-17 | End: 2021-10-20 | Stop reason: HOSPADM

## 2021-10-17 RX ORDER — KETOROLAC TROMETHAMINE 30 MG/ML
30 INJECTION, SOLUTION INTRAMUSCULAR; INTRAVENOUS EVERY 6 HOURS PRN
Status: DISCONTINUED | OUTPATIENT
Start: 2021-10-17 | End: 2021-10-20 | Stop reason: HOSPADM

## 2021-10-17 RX ORDER — 0.9 % SODIUM CHLORIDE 0.9 %
1000 INTRAVENOUS SOLUTION INTRAVENOUS ONCE
Status: COMPLETED | OUTPATIENT
Start: 2021-10-17 | End: 2021-10-17

## 2021-10-17 RX ORDER — HYDROMORPHONE HCL 110MG/55ML
1 PATIENT CONTROLLED ANALGESIA SYRINGE INTRAVENOUS EVERY 4 HOURS PRN
Status: COMPLETED | OUTPATIENT
Start: 2021-10-17 | End: 2021-10-20

## 2021-10-17 RX ORDER — ACETAMINOPHEN 650 MG/1
650 SUPPOSITORY RECTAL EVERY 6 HOURS PRN
Status: DISCONTINUED | OUTPATIENT
Start: 2021-10-17 | End: 2021-10-20 | Stop reason: HOSPADM

## 2021-10-17 RX ORDER — SODIUM CHLORIDE 0.9 % (FLUSH) 0.9 %
5-40 SYRINGE (ML) INJECTION EVERY 12 HOURS SCHEDULED
Status: DISCONTINUED | OUTPATIENT
Start: 2021-10-17 | End: 2021-10-20 | Stop reason: HOSPADM

## 2021-10-17 RX ORDER — HYDROMORPHONE HCL 110MG/55ML
2 PATIENT CONTROLLED ANALGESIA SYRINGE INTRAVENOUS ONCE
Status: COMPLETED | OUTPATIENT
Start: 2021-10-17 | End: 2021-10-17

## 2021-10-17 RX ORDER — OXYCODONE HCL 10 MG/1
20 TABLET, FILM COATED, EXTENDED RELEASE ORAL EVERY 12 HOURS SCHEDULED
Status: DISCONTINUED | OUTPATIENT
Start: 2021-10-17 | End: 2021-10-18

## 2021-10-17 RX ORDER — FOLIC ACID 1 MG/1
2 TABLET ORAL DAILY
Status: DISCONTINUED | OUTPATIENT
Start: 2021-10-18 | End: 2021-10-20 | Stop reason: HOSPADM

## 2021-10-17 RX ORDER — DOCUSATE SODIUM 100 MG/1
100 CAPSULE, LIQUID FILLED ORAL
Status: DISCONTINUED | OUTPATIENT
Start: 2021-10-18 | End: 2021-10-20 | Stop reason: HOSPADM

## 2021-10-17 RX ORDER — POLYETHYLENE GLYCOL 3350 17 G/17G
17 POWDER, FOR SOLUTION ORAL DAILY PRN
Status: DISCONTINUED | OUTPATIENT
Start: 2021-10-17 | End: 2021-10-20 | Stop reason: HOSPADM

## 2021-10-17 RX ORDER — ONDANSETRON 4 MG/1
4 TABLET, ORALLY DISINTEGRATING ORAL EVERY 8 HOURS PRN
Status: DISCONTINUED | OUTPATIENT
Start: 2021-10-17 | End: 2021-10-20 | Stop reason: HOSPADM

## 2021-10-17 RX ORDER — KETOROLAC TROMETHAMINE 30 MG/ML
15 INJECTION, SOLUTION INTRAMUSCULAR; INTRAVENOUS ONCE
Status: COMPLETED | OUTPATIENT
Start: 2021-10-17 | End: 2021-10-17

## 2021-10-17 RX ORDER — LEVALBUTEROL 1.25 MG/.5ML
0.31 SOLUTION, CONCENTRATE RESPIRATORY (INHALATION) EVERY 6 HOURS PRN
Status: DISCONTINUED | OUTPATIENT
Start: 2021-10-17 | End: 2021-10-20 | Stop reason: HOSPADM

## 2021-10-17 RX ORDER — HYDROXYUREA 500 MG/1
1000 CAPSULE ORAL DAILY
Status: DISCONTINUED | OUTPATIENT
Start: 2021-10-18 | End: 2021-10-20 | Stop reason: HOSPADM

## 2021-10-17 RX ORDER — ONDANSETRON 2 MG/ML
4 INJECTION INTRAMUSCULAR; INTRAVENOUS EVERY 6 HOURS PRN
Status: DISCONTINUED | OUTPATIENT
Start: 2021-10-17 | End: 2021-10-20 | Stop reason: HOSPADM

## 2021-10-17 RX ORDER — SODIUM CHLORIDE 0.9 % (FLUSH) 0.9 %
5-40 SYRINGE (ML) INJECTION PRN
Status: DISCONTINUED | OUTPATIENT
Start: 2021-10-17 | End: 2021-10-20 | Stop reason: HOSPADM

## 2021-10-17 RX ORDER — OXYCODONE HYDROCHLORIDE 5 MG/1
10 TABLET ORAL EVERY 4 HOURS PRN
Status: DISCONTINUED | OUTPATIENT
Start: 2021-10-17 | End: 2021-10-20 | Stop reason: HOSPADM

## 2021-10-17 RX ORDER — SODIUM CHLORIDE 9 MG/ML
INJECTION, SOLUTION INTRAVENOUS CONTINUOUS
Status: ACTIVE | OUTPATIENT
Start: 2021-10-17 | End: 2021-10-18

## 2021-10-17 RX ORDER — HYDROMORPHONE HCL 110MG/55ML
1 PATIENT CONTROLLED ANALGESIA SYRINGE INTRAVENOUS ONCE
Status: COMPLETED | OUTPATIENT
Start: 2021-10-17 | End: 2021-10-17

## 2021-10-17 RX ADMIN — SODIUM CHLORIDE 1000 ML: 9 INJECTION, SOLUTION INTRAVENOUS at 12:34

## 2021-10-17 RX ADMIN — KETOROLAC TROMETHAMINE 15 MG: 30 INJECTION, SOLUTION INTRAMUSCULAR at 14:11

## 2021-10-17 RX ADMIN — OXYCODONE HYDROCHLORIDE 20 MG: 10 TABLET, FILM COATED, EXTENDED RELEASE ORAL at 21:39

## 2021-10-17 RX ADMIN — SODIUM CHLORIDE 1000 ML: 9 INJECTION, SOLUTION INTRAVENOUS at 14:13

## 2021-10-17 RX ADMIN — HYDROMORPHONE HYDROCHLORIDE 2 MG: 2 INJECTION, SOLUTION INTRAMUSCULAR; INTRAVENOUS; SUBCUTANEOUS at 12:34

## 2021-10-17 RX ADMIN — KETOROLAC TROMETHAMINE 30 MG: 30 INJECTION, SOLUTION INTRAMUSCULAR at 18:19

## 2021-10-17 RX ADMIN — HYDROMORPHONE HYDROCHLORIDE 1 MG: 2 INJECTION, SOLUTION INTRAMUSCULAR; INTRAVENOUS; SUBCUTANEOUS at 23:46

## 2021-10-17 RX ADMIN — SODIUM CHLORIDE: 9 INJECTION, SOLUTION INTRAVENOUS at 18:38

## 2021-10-17 RX ADMIN — HYDROMORPHONE HYDROCHLORIDE 1 MG: 1 INJECTION, SOLUTION INTRAMUSCULAR; INTRAVENOUS; SUBCUTANEOUS at 17:34

## 2021-10-17 RX ADMIN — HYDROMORPHONE HYDROCHLORIDE 1 MG: 2 INJECTION, SOLUTION INTRAMUSCULAR; INTRAVENOUS; SUBCUTANEOUS at 19:43

## 2021-10-17 RX ADMIN — HYDROMORPHONE HYDROCHLORIDE 2 MG: 2 INJECTION, SOLUTION INTRAMUSCULAR; INTRAVENOUS; SUBCUTANEOUS at 13:30

## 2021-10-17 RX ADMIN — Medication 1 MG: at 17:34

## 2021-10-17 RX ADMIN — OXYCODONE 10 MG: 5 TABLET ORAL at 18:21

## 2021-10-17 ASSESSMENT — PAIN SCALES - GENERAL
PAINLEVEL_OUTOF10: 10
PAINLEVEL_OUTOF10: 9
PAINLEVEL_OUTOF10: 10
PAINLEVEL_OUTOF10: 10
PAINLEVEL_OUTOF10: 9
PAINLEVEL_OUTOF10: 9
PAINLEVEL_OUTOF10: 10

## 2021-10-17 ASSESSMENT — PAIN DESCRIPTION - LOCATION
LOCATION: HIP;LEG
LOCATION: HIP;LEG

## 2021-10-17 ASSESSMENT — PAIN DESCRIPTION - PAIN TYPE
TYPE: ACUTE PAIN
TYPE: ACUTE PAIN

## 2021-10-17 ASSESSMENT — PAIN DESCRIPTION - ORIENTATION
ORIENTATION: RIGHT
ORIENTATION: RIGHT

## 2021-10-17 NOTE — H&P
Hospital Medicine History & Physical      PCP: Yoko Byrnes MD    Date of Admission: 10/17/2021    Date of Service: Pt seen/examined on 10/17/21 and Placed in Observation. Chief Complaint:  Right leg pain      History Of Present Illness:      24 y.o. male with history of sickle cell anemia who presented to [facility] with right hip/leg pain and concerns for sickle cell crisis by ER staff. Last night the pain woke him up and he noted radiation into his groin and down his leg into his right knee. Patient has had multiple admissions for sickle cell crisis. His prior med rec notes 20mg  oxycontin twice a day(but states he does not take this at home) and 10 mg of oxycodone every 4 hours as needed. ER course: given ivfs(2L bolus),iv toradol, iv dilaudid,Hemonc was consulted       Past Medical History:          Diagnosis Date    Accidental overdose     Asthma     Enlarged heart     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)     Sickle cell anemia (HCC)        Past Surgical History:          Procedure Laterality Date    SPLENECTOMY         Medications Prior to Admission:      Prior to Admission medications    Medication Sig Start Date End Date Taking? Authorizing Provider   folic acid (FOLVITE) 1 MG tablet Take 2 tablets by mouth daily 9/16/21   CARMELITA Moreno CNP   oxyCODONE HCl (OXY-IR) 10 MG immediate release tablet Take 10 mg by mouth every 4 hours as needed for Pain.     Historical Provider, MD   albuterol sulfate HFA (PROAIR HFA) 108 (90 Base) MCG/ACT inhaler Albuterol HFA Inhaler 90 mcg/actuation  inhaled  2 puffs prn     Active    Historical Provider, MD   levalbuterol (XOPENEX) 0.31 MG/3ML nebulization Levalbuterol Nebulized    2 puffs prn     Active    Historical Provider, MD   docusate (COLACE, DULCOLAX) 100 MG CAPS Take 100 mg by mouth    Historical Provider, MD   ibuprofen (ADVIL;MOTRIN) 800 MG tablet ibuprofen 800 MG Oral Tablet  oral   prn    Active    Historical Provider, MD   oxyCODONE (OXYCONTIN) 20 MG extended release tablet TAKE 1 TABLET BY MOUTH EVERY 12 HOURS. TAKE WITH ENOUGH WATER TO SWALLOW WHOLE. DO NOT CRUSH/DISSOLVE/CHEW/CUT/BREAK. 4/12/21   Historical Provider, MD   hydroxyurea (HYDREA) 500 MG chemo capsule Take 1,000 mg by mouth daily  12/9/20   Ling Sutherland MD       Allergies:  Morphine    Social History:      The patient currently lives at home    TOBACCO:   reports that he has never smoked. He has never used smokeless tobacco.  ETOH:   reports previous alcohol use. E-Cigarettes/Vaping Use     Questions Responses    E-Cigarette/Vaping Use Never User    Start Date     Passive Exposure     Quit Date     Counseling Given     Comments             Family History:       Reviewed in detail and negative for DM, CAD, Cancer, CVA. Positive as follows:        Problem Relation Age of Onset    Other Father         sarcoidosis       REVIEW OF SYSTEMS COMPLETED:   Pertinent positives as noted in the HPI. All other systems reviewed and negative. PHYSICAL EXAM PERFORMED:    BP (!) 114/50   Pulse 105   Temp 97.6 °F (36.4 °C) (Axillary)   Resp 16   Wt 180 lb (81.6 kg)   SpO2 94%   BMI 27.37 kg/m²     General appearance:  No apparent distress(pt became more agitated/distressed when author appeared in room), appears stated age and cooperative. HEENT:  Normal cephalic, atraumatic without obvious deformity. Pupils equal, round, and reactive to light. Extra ocular muscles intact. Conjunctivae/corneas clear. Neck: Supple, with full range of motion. No jugular venous distention. Trachea midline. Respiratory:  Normal respiratory effort. Clear to auscultation, bilaterally without Rales/Wheezes/Rhonchi. Cardiovascular:  Regular rate and rhythm with normal S1/S2 without murmurs, rubs or gallops. Abdomen: Soft, non-tender, non-distended with normal bowel sounds. Musculoskeletal:  No clubbing, cyanosis or edema bilaterally. Full range of motion without deformity.  Pt was resting prone and then curled into fetal position and began flexing at knee/hip when Babara Kub was present in ER  Skin: Skin color, texture, turgor normal.  No rashes or lesions. Neurologic:  Neurovascularly intact without any focal sensory/motor deficits. Cranial nerves: II-XII intact, grossly non-focal.  Psychiatric:  Alert and oriented, thought content appropriate, normal insight  Capillary Refill: Brisk,3 seconds, normal  Peripheral Pulses: +2 palpable, equal bilaterally       Labs:     Recent Labs     10/17/21  1219   WBC 19.7*   HGB 8.9*   HCT 25.3*        Recent Labs     10/17/21  1219      K 3.9      CO2 24   BUN 7   CREATININE 0.8*   CALCIUM 9.5     Recent Labs     10/17/21  1219   AST 34   ALT 22   BILITOT 4.1*   ALKPHOS 111     No results for input(s): INR in the last 72 hours. No results for input(s): [place] in the last 72 hours. Urinalysis:      Lab Results   Component Value Date    NITRU Negative 06/09/2021    WBCUA None seen 06/09/2021    BACTERIA Rare 06/09/2021    RBCUA 0-2 06/09/2021    BLOODU TRACE-INTACT 06/09/2021    SPECGRAV 1.010 06/09/2021    GLUCOSEU Negative 06/09/2021       Radiology:     EKG:  I have reviewed the EKG with the following interpretation: na    XR CHEST PORTABLE   Final Result   No acute abnormality         XR HIP 2-3 VW W PELVIS RIGHT   Final Result   No acute abnormality             ASSESSMENT:    Active Hospital Problems    Diagnosis Date Noted    Right leg pain [M79.604] 10/17/2021         PLAN:    Right leg/hip pain- with concerns for sickle cell crisis, xray was  unremarkable  -supportive care  -await hemonc recs on this  -will need to consider MRI to rule out avascular necrosis(defer to hemonc on this)    Suspected Sickle cell crisis: seems recurrent.   Hematology already consulted in ER-apprec mgmt, there has been concern for noncompliance/opiod addiction in the past.  -  Started supportive care with IV fluids, IV Dilaudid prn, IV

## 2021-10-17 NOTE — ED NOTES
Pt arrives for complaints of sickle cell flare up. Pt complains of pain in R hip/leg. Pt alert and oriented. Pt VSS at this time.       Niki Plata RN  10/17/21 5197

## 2021-10-17 NOTE — ED NOTES
Pt requesting pain medication at this time. Pt states he is able to have oral pain medication or is able to have IV dilaudid at 530pm. Pt states he did not want oral pain medication and wanted to wait until he could have IV pain medication.       Mp Montano RN  10/17/21 1413

## 2021-10-17 NOTE — Clinical Note
Patient Class: Observation [104]   REQUIRED: Diagnosis: Knee pain [942393]   Estimated Length of Stay: Estimated stay of less than 2 midnights   Telemetry/Cardiac Monitoring Required?: Yes

## 2021-10-17 NOTE — ED PROVIDER NOTES
[facility]  ED      CHIEF COMPLAINT  Sickle Cell Pain Crisis       HISTORY OF PRESENT ILLNESS  April Gutierrez is a 24 y.o. male with history of sickle cell disease who presents to the emergency department for evaluation of pain crises. Patient reports having pain everywhere, especially over the right lower extremity. Says this is happened before with his sickle cell crises. Says he has tried oxycodone at home with no improvement of symptoms. Denies any new injuries. Denies having associated fevers, chills, chest pain, shortness of breath. Denies any known sick contacts. Reports having no numbness or tingling sensation. Denies having pain to the testicles. No other complaints, modifying factors or associated symptoms. I have reviewed the following from the nursing documentation.     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)     Sickle cell anemia (HCC)      Past Surgical History:   Procedure Laterality Date    SPLENECTOMY       Family History   Problem Relation Age of Onset    Other Father         sarcoidosis     Social History     Socioeconomic History    Marital status: Single     Spouse name: Not on file    Number of children: 0    Years of education: Not on file    Highest education level: Not on file   Occupational History    Not on file   Tobacco Use    Smoking status: Never Smoker    Smokeless tobacco: Never Used   Vaping Use    Vaping Use: Never used   Substance and Sexual Activity    Alcohol use: Not Currently    Drug use: Never    Sexual activity: Not Currently   Other Topics Concern    Not on file   Social History Narrative    Not on file     Social Determinants of Health     Financial Resource Strain:     Difficulty of Paying Living Expenses:    Food Insecurity:     Worried About Running Out of Food in the Last Year:     Laura of Food in the Last Year: EXAM  /64   Pulse 103   Temp 97.6 °F (36.4 °C) (Axillary)   Resp 16   Wt 180 lb (81.6 kg)   SpO2 97%   BMI 27.37 kg/m²    GENERAL APPEARANCE: Awake and alert. No acute distress. HENT: Normocephalic. Atraumatic. PERRL. EOMI. No facial droop. HEART/CHEST: RRR. LUNGS: Respirations unlabored. Speaking comfortably in full sentences. ABDOMEN: Soft, non-distended abdomen. Non tender to palpation. No guarding. No rebound. EXTREMITIES: No gross deformities. Spontaneous full ROM at bilateral hip/knee. No injuries overlying the right hip. No overlying erythema or warmth over the hip. No testicular tenderness. SKIN: Warm and dry. No acute rashes. NEUROLOGICAL: Alert and oriented. No gross facial drooping. Answering questions appropriately. Moving all extremities. PSYCHIATRIC: Pleasant. Normal mood and affect.     LABS  Results for orders placed or performed during the hospital encounter of 10/17/21   Comprehensive Metabolic Panel   Result Value Ref Range    Sodium 139 136 - 145 mmol/L    Potassium 3.9 3.5 - 5.1 mmol/L    Chloride 103 99 - 110 mmol/L    CO2 24 21 - 32 mmol/L    Anion Gap 12 3 - 16    Glucose 93 70 - 99 mg/dL    BUN 7 7 - 20 mg/dL    CREATININE 0.8 (L) 0.9 - 1.3 mg/dL    GFR Non-African American >60 >60    GFR African American >60 >60    Calcium 9.5 8.3 - 10.6 mg/dL    Total Protein 7.2 6.4 - 8.2 g/dL    Albumin 4.5 3.4 - 5.0 g/dL    Albumin/Globulin Ratio 1.7 1.1 - 2.2    Total Bilirubin 4.1 (H) 0.0 - 1.0 mg/dL    Alkaline Phosphatase 111 40 - 129 U/L    ALT 22 10 - 40 U/L    AST 34 15 - 37 U/L    Globulin 2.7 Not Established g/dL   CBC Auto Differential   Result Value Ref Range    WBC 19.7 (H) 4.0 - 11.0 K/uL    RBC 2.47 (L) 4.20 - 5.90 M/uL    Hemoglobin 8.9 (L) 13.5 - 17.5 g/dL    Hematocrit 25.3 (L) 40.5 - 52.5 %    .6 (H) 80.0 - 100.0 fL    MCH 35.9 (H) 26.0 - 34.0 pg    MCHC 34.9 31.0 - 36.0 g/dL    RDW 22.5 (H) 12.4 - 15.4 %    Platelets 280 865 - 485 K/uL    MPV 7.7 5.0 - 10.5 fL    PLATELET SLIDE REVIEW Increased     SLIDE REVIEW see below     Path Consult No     Neutrophils % 69.0 %    Lymphocytes % 24.0 %    Monocytes % 1.0 %    Eosinophils % 0.0 %    Basophils % 0.0 %    Neutrophils Absolute 13.6 (H) 1.7 - 7.7 K/uL    Lymphocytes Absolute 5.9 (H) 1.0 - 5.1 K/uL    Monocytes Absolute 0.2 0.0 - 1.3 K/uL    Eosinophils Absolute 0.0 0.0 - 0.6 K/uL    Basophils Absolute 0.0 0.0 - 0.2 K/uL    Atypical Lymphocytes Relative 6 0 - 6 %    nRBC 1 (A) /100 WBC    Anisocytosis 2+ (A)     Macrocytes 1+ (A)     Microcytes Occasional (A)     Polychromasia 2+ (A)     Ovalocytes 2+ (A)     Spherocytes Occasional (A)     Conteh-Jolly Bodies Occasional (A)     Sickle Cells 1+ (A)    Reticulocytes   Result Value Ref Range    Retic Ct Pct 7.95 (H) 0.50 - 2.18 %    Retic Ct Abs 0.196 M/uL    Immature Retic Fract 0.69 (H) 0.21 - 0.37       I have reviewed all labs for this visit. RADIOLOGY  XR CHEST PORTABLE    Result Date: 10/17/2021  EXAMINATION: ONE XRAY VIEW OF THE CHEST 10/17/2021 1:31 pm COMPARISON: 09/28/2021 HISTORY: ORDERING SYSTEM PROVIDED HISTORY: sickle cell TECHNOLOGIST PROVIDED HISTORY: Reason for exam:->sickle cell Reason for Exam: Sickle Cell Pain Crisis FINDINGS: The heart is borderline enlarged but stable. There are no focal infiltrates or pleural effusions. The osseous structures appear intact. No acute abnormality     XR CHEST PORTABLE    Result Date: 9/28/2021  EXAMINATION: ONE XRAY VIEW OF THE CHEST 9/28/2021 2:22 am COMPARISON: 08/25/2021 HISTORY: ORDERING SYSTEM PROVIDED HISTORY: back pain TECHNOLOGIST PROVIDED HISTORY: Reason for exam:->back pain FINDINGS: Heart size and configuration are normal.  The lungs are clear. No pneumothorax or pleural fluid. No acute bone finding. No acute cardiopulmonary disease.      XR HIP 2-3 VW W PELVIS RIGHT    Result Date: 10/17/2021  EXAMINATION: ONE XRAY VIEW OF THE PELVIS AND TWO XRAY VIEWS RIGHT HIP 10/17/2021 1:31 pm COMPARISON: None. HISTORY: ORDERING SYSTEM PROVIDED HISTORY: right hip pain TECHNOLOGIST PROVIDED HISTORY: Reason for exam:->right hip pain Reason for Exam: Sickle Cell Pain Crisis - right hip pain FINDINGS: The soft tissues are unremarkable. There is no fracture or dislocation. No focal destructive osseous lesion. No radiopaque foreign body is identified. No acute abnormality     ED COURSE/MDM  Patient seen and evaluated. At presentation, patient was awake, alert, afebrile, hemodynamically stable, and satting well on room air. Patient given 1 L IV fluid bolus and given Dilaudid for pain control. X-ray of chest and right hip obtained. Basic labs obtained. On reassessment, patient reports no improvement in symptoms after the Dilaudid. Patient ordered second liter IV fluid bolus and additional dose of Dilaudid. X-ray of the right hip shows no acute pathology. Chest x-ray does not show acute pathology. Creatinines normal.  Liver enzymes within normal limits. Labs not significantly changed compared to 5 days ago. Despite the 2 doses of 2 mg IV Dilaudid, 15 Toradol, and 2 L IV fluid bolus, patient complaining of 10 out of 10 symptoms. Hospitalist consulted for admission for sickle cell pain crises. Admit. Pt was seen during the [place] 19 pandemic. Appropriate PPE worn by ME during patient encounters. Pt seen during a time with constrained hospital bed capacity and other potential inpatient and outpatient resources were constrained due to the viral pandemic.      During the patient's ED course, the patient was given:  Medications   0.9 % sodium chloride bolus (1,000 mLs IntraVENous New Bag 10/17/21 1234)   HYDROmorphone (DILAUDID) injection 2 mg (2 mg IntraVENous Given 10/17/21 1234)   HYDROmorphone (DILAUDID) injection 2 mg (2 mg IntraVENous Given 10/17/21 1330)   ketorolac (TORADOL) injection 15 mg (15 mg IntraVENous Given 10/17/21 1411)   0.9 % sodium chloride bolus (1,000 mLs IntraVENous New Bag 10/17/21 1413)        CLINICAL IMPRESSION  1. Sickle cell crisis (HCC)        Blood pressure 124/64, pulse 103, temperature 97.6 °F (36.4 °C), temperature source Axillary, resp. rate 16, weight 180 lb (81.6 kg), SpO2 97 %. 12 Eva Pierce was admitted to the hospital.      Patient was given scripts for the following medications. I counseled patient how to take these medications. New Prescriptions    No medications on file       Follow-up with:  No follow-up provider specified. DISCLAIMER: This chart was created using Dragon dictation software. Efforts were made by me to ensure accuracy, however some errors may be present due to limitations of this technology and occasionally words are not transcribed correctly.        Elpidio Salazar MD  10/18/21 2022

## 2021-10-17 NOTE — ED NOTES
Bed: 31  Expected date:   Expected time:   Means of arrival:   Comments:  Earnestine Leblanc RN  10/17/21 1974

## 2021-10-17 NOTE — PROGRESS NOTES
Pt a/o and oriented to room. Per pt request, secure message sent to crosscover Dr. Noemi Meckel \"Pt admitted with Sickle Cell Crisis. Shaking, moaning, and stating pain 10 out of 10. Currently on 1 mg Dilaudid Q4hr (given at 1734), prn oxycodone IR 10 mg Q4, and IV Toradol 30 mg Q6hr, and oxycodone ED 20 MG BID. Pt requesting for pain medication to be adjusted to control pain better. Thanks! \"    Dr. Noemi Meckel returned phone call, see new orders. Pt medicated per MAR. Bed locked in lowest position; side rails 2/4. Call light and bedside table within reach of pt.

## 2021-10-17 NOTE — RT PROTOCOL NOTE
RT Inhaler-Nebulizer Bronchodilator Protocol Note    There is a bronchodilator order in the chart from a provider indicating to follow the RT Bronchodilator Protocol and there is an Initiate RT Inhaler-Nebulizer Bronchodilator Protocol order as well (see protocol at bottom of note). CXR Findings:  XR CHEST PORTABLE    Result Date: 10/17/2021  No acute abnormality       The findings from the last RT Protocol Assessment were as follows:   History Pulmonary Disease: None or smoker <15 pack years  Respiratory Pattern: Regular pattern and RR 12-20 bpm  Breath Sounds: Clear breath sounds  Cough: Strong, spontaneous, non-productive  Indication for Bronchodilator Therapy: On home bronchodilators  Bronchodilator Assessment Score: 0    Aerosolized bronchodilator medication orders have been revised according to the RT Inhaler-Nebulizer Bronchodilator Protocol below. Respiratory Therapist to perform RT Therapy Protocol Assessment initially then follow the protocol. Repeat RT Therapy Protocol Assessment PRN for score 0-3 or on second treatment, BID, and PRN for scores above 3. No Indications - adjust the frequency to every 6 hours PRN wheezing or bronchospasm, if no treatments needed after 48 hours then discontinue using Per Protocol order mode. If indication present, adjust the RT bronchodilator orders based on the Bronchodilator Assessment Score as indicated below. Use Inhaler orders unless patient has one or more of the following: on home nebulizer, not able to hold breath for 10 seconds, is not alert and oriented, cannot activate and use MDI correctly, or respiratory rate 25 breaths per minute or more, then use the equivalent nebulizer order(s) with same Frequency and PRN reasons based on the score. If a patient is on this medication at home then do not decrease Frequency below that used at home.     0-3 - enter or revise RT bronchodilator order(s) to equivalent RT Bronchodilator order with Frequency of every 4 hours PRN for wheezing or increased work of breathing using Per Protocol order mode. 4-6 - enter or revise RT Bronchodilator order(s) to two equivalent RT bronchodilator orders with one order with BID Frequency and one order with Frequency of every 4 hours PRN wheezing or increased work of breathing using Per Protocol order mode. 7-10 - enter or revise RT Bronchodilator order(s) to two equivalent RT bronchodilator orders with one order with TID Frequency and one order with Frequency of every 4 hours PRN wheezing or increased work of breathing using Per Protocol order mode. 11-13 - enter or revise RT Bronchodilator order(s) to one equivalent RT bronchodilator order with QID Frequency and an Albuterol order with Frequency of every 4 hours PRN wheezing or increased work of breathing using Per Protocol order mode. Greater than 13 - enter or revise RT Bronchodilator order(s) to one equivalent RT bronchodilator order with every 4 hours Frequency and an Albuterol order with Frequency of every 2 hours PRN wheezing or increased work of breathing using Per Protocol order mode. RT to enter RT Home Evaluation for COPD & MDI Assessment order using Per Protocol order mode.     Electronically signed by Jacinto Rodriguez RCP on 10/17/2021 at 7:48 PM

## 2021-10-18 ENCOUNTER — APPOINTMENT (OUTPATIENT)
Dept: VASCULAR LAB | Age: 21
End: 2021-10-18
Payer: COMMERCIAL

## 2021-10-18 LAB
ALBUMIN SERPL-MCNC: 4.1 G/DL (ref 3.4–5)
ANION GAP SERPL CALCULATED.3IONS-SCNC: 10 MMOL/L (ref 3–16)
APTT: 29.6 SEC (ref 26.2–38.6)
APTT: 67.8 SEC (ref 26.2–38.6)
BASOPHILS ABSOLUTE: 0 K/UL (ref 0–0.2)
BASOPHILS RELATIVE PERCENT: 0.3 %
BUN BLDV-MCNC: 7 MG/DL (ref 7–20)
CALCIUM SERPL-MCNC: 9 MG/DL (ref 8.3–10.6)
CHLORIDE BLD-SCNC: 103 MMOL/L (ref 99–110)
CO2: 24 MMOL/L (ref 21–32)
CREAT SERPL-MCNC: 0.7 MG/DL (ref 0.9–1.3)
EOSINOPHILS ABSOLUTE: 0.1 K/UL (ref 0–0.6)
EOSINOPHILS RELATIVE PERCENT: 0.6 %
GFR AFRICAN AMERICAN: >60
GFR NON-AFRICAN AMERICAN: >60
GLUCOSE BLD-MCNC: 135 MG/DL (ref 70–99)
HCT VFR BLD CALC: 22.1 % (ref 40.5–52.5)
HEMOGLOBIN: 7.5 G/DL (ref 13.5–17.5)
LYMPHOCYTES ABSOLUTE: 2.5 K/UL (ref 1–5.1)
LYMPHOCYTES RELATIVE PERCENT: 16.1 %
MCH RBC QN AUTO: 35 PG (ref 26–34)
MCHC RBC AUTO-ENTMCNC: 33.8 G/DL (ref 31–36)
MCV RBC AUTO: 103.7 FL (ref 80–100)
MONOCYTES ABSOLUTE: 1 K/UL (ref 0–1.3)
MONOCYTES RELATIVE PERCENT: 6.3 %
NEUTROPHILS ABSOLUTE: 12 K/UL (ref 1.7–7.7)
NEUTROPHILS RELATIVE PERCENT: 76.7 %
PDW BLD-RTO: 23.3 % (ref 12.4–15.4)
PHOSPHORUS: 3.5 MG/DL (ref 2.5–4.9)
PLATELET # BLD: 391 K/UL (ref 135–450)
PMV BLD AUTO: 8.1 FL (ref 5–10.5)
POTASSIUM SERPL-SCNC: 3.3 MMOL/L (ref 3.5–5.1)
RBC # BLD: 2.13 M/UL (ref 4.2–5.9)
SODIUM BLD-SCNC: 137 MMOL/L (ref 136–145)
WBC # BLD: 15.7 K/UL (ref 4–11)

## 2021-10-18 PROCEDURE — 6360000002 HC RX W HCPCS: Performed by: INTERNAL MEDICINE

## 2021-10-18 PROCEDURE — 6370000000 HC RX 637 (ALT 250 FOR IP): Performed by: INTERNAL MEDICINE

## 2021-10-18 PROCEDURE — 36415 COLL VENOUS BLD VENIPUNCTURE: CPT

## 2021-10-18 PROCEDURE — 96375 TX/PRO/DX INJ NEW DRUG ADDON: CPT

## 2021-10-18 PROCEDURE — 96361 HYDRATE IV INFUSION ADD-ON: CPT

## 2021-10-18 PROCEDURE — 85025 COMPLETE CBC W/AUTO DIFF WBC: CPT

## 2021-10-18 PROCEDURE — 80069 RENAL FUNCTION PANEL: CPT

## 2021-10-18 PROCEDURE — 85730 THROMBOPLASTIN TIME PARTIAL: CPT

## 2021-10-18 PROCEDURE — 6370000000 HC RX 637 (ALT 250 FOR IP): Performed by: STUDENT IN AN ORGANIZED HEALTH CARE EDUCATION/TRAINING PROGRAM

## 2021-10-18 PROCEDURE — 93971 EXTREMITY STUDY: CPT

## 2021-10-18 PROCEDURE — 2580000003 HC RX 258: Performed by: INTERNAL MEDICINE

## 2021-10-18 PROCEDURE — 6360000002 HC RX W HCPCS: Performed by: STUDENT IN AN ORGANIZED HEALTH CARE EDUCATION/TRAINING PROGRAM

## 2021-10-18 PROCEDURE — 96376 TX/PRO/DX INJ SAME DRUG ADON: CPT

## 2021-10-18 PROCEDURE — 2580000003 HC RX 258: Performed by: STUDENT IN AN ORGANIZED HEALTH CARE EDUCATION/TRAINING PROGRAM

## 2021-10-18 PROCEDURE — G0378 HOSPITAL OBSERVATION PER HR: HCPCS

## 2021-10-18 RX ORDER — HEPARIN SODIUM 10000 [USP'U]/100ML
1960 INJECTION, SOLUTION INTRAVENOUS CONTINUOUS
Status: DISCONTINUED | OUTPATIENT
Start: 2021-10-18 | End: 2021-10-20

## 2021-10-18 RX ORDER — HEPARIN SODIUM 1000 [USP'U]/ML
40 INJECTION, SOLUTION INTRAVENOUS; SUBCUTANEOUS PRN
Status: DISCONTINUED | OUTPATIENT
Start: 2021-10-18 | End: 2021-10-20

## 2021-10-18 RX ORDER — POTASSIUM CHLORIDE 20 MEQ/1
20 TABLET, EXTENDED RELEASE ORAL ONCE
Status: COMPLETED | OUTPATIENT
Start: 2021-10-18 | End: 2021-10-18

## 2021-10-18 RX ORDER — HEPARIN SODIUM 1000 [USP'U]/ML
80 INJECTION, SOLUTION INTRAVENOUS; SUBCUTANEOUS ONCE
Status: COMPLETED | OUTPATIENT
Start: 2021-10-18 | End: 2021-10-18

## 2021-10-18 RX ORDER — MORPHINE SULFATE 15 MG/1
15 TABLET, FILM COATED, EXTENDED RELEASE ORAL EVERY 12 HOURS SCHEDULED
Status: DISCONTINUED | OUTPATIENT
Start: 2021-10-18 | End: 2021-10-20 | Stop reason: HOSPADM

## 2021-10-18 RX ORDER — KETOROLAC TROMETHAMINE 30 MG/ML
30 INJECTION, SOLUTION INTRAMUSCULAR; INTRAVENOUS 3 TIMES DAILY
Status: DISCONTINUED | OUTPATIENT
Start: 2021-10-18 | End: 2021-10-20 | Stop reason: HOSPADM

## 2021-10-18 RX ORDER — HEPARIN SODIUM 1000 [USP'U]/ML
80 INJECTION, SOLUTION INTRAVENOUS; SUBCUTANEOUS PRN
Status: DISCONTINUED | OUTPATIENT
Start: 2021-10-18 | End: 2021-10-20

## 2021-10-18 RX ORDER — SODIUM CHLORIDE 9 MG/ML
INJECTION, SOLUTION INTRAVENOUS CONTINUOUS
Status: ACTIVE | OUTPATIENT
Start: 2021-10-18 | End: 2021-10-19

## 2021-10-18 RX ADMIN — KETOROLAC TROMETHAMINE 30 MG: 30 INJECTION, SOLUTION INTRAMUSCULAR at 20:37

## 2021-10-18 RX ADMIN — OXYCODONE 10 MG: 5 TABLET ORAL at 05:30

## 2021-10-18 RX ADMIN — HEPARIN SODIUM AND DEXTROSE 1470 UNITS/HR: 10000; 5 INJECTION INTRAVENOUS at 17:47

## 2021-10-18 RX ADMIN — KETOROLAC TROMETHAMINE 30 MG: 30 INJECTION, SOLUTION INTRAMUSCULAR at 01:07

## 2021-10-18 RX ADMIN — HYDROMORPHONE HYDROCHLORIDE 1 MG: 2 INJECTION, SOLUTION INTRAMUSCULAR; INTRAVENOUS; SUBCUTANEOUS at 17:40

## 2021-10-18 RX ADMIN — OXYCODONE 10 MG: 5 TABLET ORAL at 12:09

## 2021-10-18 RX ADMIN — HYDROMORPHONE HYDROCHLORIDE 1 MG: 2 INJECTION, SOLUTION INTRAMUSCULAR; INTRAVENOUS; SUBCUTANEOUS at 08:09

## 2021-10-18 RX ADMIN — KETOROLAC TROMETHAMINE 30 MG: 30 INJECTION, SOLUTION INTRAMUSCULAR at 13:33

## 2021-10-18 RX ADMIN — HYDROMORPHONE HYDROCHLORIDE 1 MG: 2 INJECTION, SOLUTION INTRAMUSCULAR; INTRAVENOUS; SUBCUTANEOUS at 22:21

## 2021-10-18 RX ADMIN — POTASSIUM CHLORIDE 20 MEQ: 20 TABLET, EXTENDED RELEASE ORAL at 12:09

## 2021-10-18 RX ADMIN — HEPARIN SODIUM 6530 UNITS: 1000 INJECTION INTRAVENOUS; SUBCUTANEOUS at 17:41

## 2021-10-18 RX ADMIN — HYDROMORPHONE HYDROCHLORIDE 1 MG: 2 INJECTION, SOLUTION INTRAMUSCULAR; INTRAVENOUS; SUBCUTANEOUS at 03:48

## 2021-10-18 RX ADMIN — HYDROXYUREA 1000 MG: 500 CAPSULE ORAL at 09:30

## 2021-10-18 RX ADMIN — SODIUM CHLORIDE, PRESERVATIVE FREE 10 ML: 5 INJECTION INTRAVENOUS at 08:09

## 2021-10-18 RX ADMIN — HYDROMORPHONE HYDROCHLORIDE 1 MG: 2 INJECTION, SOLUTION INTRAMUSCULAR; INTRAVENOUS; SUBCUTANEOUS at 13:32

## 2021-10-18 RX ADMIN — FOLIC ACID 2 MG: 1 TABLET ORAL at 09:29

## 2021-10-18 RX ADMIN — KETOROLAC TROMETHAMINE 30 MG: 30 INJECTION, SOLUTION INTRAMUSCULAR at 07:18

## 2021-10-18 RX ADMIN — OXYCODONE 10 MG: 5 TABLET ORAL at 16:13

## 2021-10-18 RX ADMIN — MORPHINE SULFATE 15 MG: 15 TABLET, FILM COATED, EXTENDED RELEASE ORAL at 20:37

## 2021-10-18 RX ADMIN — SODIUM CHLORIDE: 9 INJECTION, SOLUTION INTRAVENOUS at 01:08

## 2021-10-18 RX ADMIN — KETOROLAC TROMETHAMINE 30 MG: 30 INJECTION, SOLUTION INTRAMUSCULAR at 09:29

## 2021-10-18 RX ADMIN — SODIUM CHLORIDE: 9 INJECTION, SOLUTION INTRAVENOUS at 12:08

## 2021-10-18 RX ADMIN — MORPHINE SULFATE 15 MG: 15 TABLET, FILM COATED, EXTENDED RELEASE ORAL at 09:29

## 2021-10-18 RX ADMIN — OXYCODONE 10 MG: 5 TABLET ORAL at 20:37

## 2021-10-18 RX ADMIN — OXYCODONE 10 MG: 5 TABLET ORAL at 01:07

## 2021-10-18 ASSESSMENT — PAIN SCALES - GENERAL
PAINLEVEL_OUTOF10: 9
PAINLEVEL_OUTOF10: 10
PAINLEVEL_OUTOF10: 9
PAINLEVEL_OUTOF10: 10
PAINLEVEL_OUTOF10: 9
PAINLEVEL_OUTOF10: 10
PAINLEVEL_OUTOF10: 8
PAINLEVEL_OUTOF10: 9

## 2021-10-18 NOTE — CONSULTS
[facility]    Consult Note      Requesting Physician:  Dr. Jazmín Boles:  Sickle cell crisis        HISTORY OF PRESENT ILLNESS:      Mr. Marlin Jose  is a 77-year-old male admitted with sickle cell crisis. He states he is having pain in his right upper thigh. He feels it may be swollen. He is not sure. The pain started greater than 24 hours ago is a dull throb. He denies fever, chills, nausea or vomiting.       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)     Sickle cell anemia (HCC)      Past Surgical History:    Past Surgical History:   Procedure Laterality Date    SPLENECTOMY         Current Medications:    Current Facility-Administered Medications   Medication Dose Route Frequency Provider Last Rate Last Admin    morphine (MS CONTIN) extended release tablet 15 mg  15 mg Oral 2 times per day Juan Ulloa MD        ketorolac (TORADOL) injection 30 mg  30 mg IntraVENous TID Jessica Xiao MD        ketorolac (TORADOL) injection 30 mg  30 mg IntraVENous Q6H PRN Odell Ramsay MD   30 mg at 10/18/21 0718    oxyCODONE (ROXICODONE) immediate release tablet 10 mg  10 mg Oral Q4H PRN Odell Ramsay MD   10 mg at 10/18/21 0530    hydroxyurea (HYDREA) chemo capsule 1,000 mg  1,000 mg Oral Daily Odell Ramsay MD        folic acid (FOLVITE) tablet 2 mg  2 mg Oral Daily Odell Ramsay MD        docusate sodium (COLACE) capsule 100 mg  100 mg Oral QAM AC Odell Ramsay MD        [facility]) nebulizer solution 0.3 mg  0.3 mg Nebulization Q6H PRN Odell Ramsay MD        0.9 % sodium chloride infusion   IntraVENous Continuous Odell Ramsay  mL/hr at 10/18/21 0108 New Bag at 10/18/21 0108    sodium chloride flush 0.9 % injection 5-40 mL  5-40 mL IntraVENous 2 times per day Veronica Storey MD   10 mL at 10/18/21 0809    sodium chloride flush 0.9 % injection 5-40 mL  5-40 mL IntraVENous PRN Veronica Storey MD        0.9 % sodium chloride infusion  25 mL IntraVENous PRN Veronica Storey MD        enoxaparin (LOVENOX) injection 40 mg  40 mg SubCUTAneous Daily Veronica Storey MD        ondansetron (ZOFRAN-ODT) disintegrating tablet 4 mg  4 mg Oral Q8H PRN Veronica Storey MD        Or    [facility]ron [facility]) injection 4 mg  4 mg IntraVENous Q6H PRN Veronica Storey MD        polyethylene glycol [facility]) packet 17 g  17 g Oral Daily PRN Veronica Storey MD        acetaminophen (TYLENOL) tablet 650 mg  650 mg Oral Q6H PRN Veronica Storey MD        Or    acetaminophen (TYLENOL) suppository 650 mg  650 mg Rectal Q6H PRN Veronica Storey MD        HYDROmorphone (DILAUDID) injection 1 mg  1 mg IntraVENous Q4H PRN Veronica Stoery MD   1 mg at 10/18/21 0809     Allergies:     Allergies   Allergen Reactions    Morphine Shortness Of Breath     Other reaction(s): Histamine-like Reaction  Has asthma exacerbation with morphine-histamine type reaction         Social History:   Social History     Socioeconomic History    Marital status: Single     Spouse name: Not on file    Number of children: 0    Years of education: Not on file    Highest education level: Not on file   Occupational History    Not on file   Tobacco Use    Smoking status: Never Smoker    Smokeless tobacco: Never Used   Vaping Use    Vaping Use: Never used   Substance and Sexual Activity    Alcohol use: Not Currently    Drug use: Never    Sexual activity: Not Currently   Other Topics Concern    Not on file   Social History Narrative    Not on file     Social Determinants of Health     Financial Resource Strain:     Difficulty of Paying Living Expenses:    Food Insecurity:     Worried About Running Out of Food in the Last Year:     Ran Out of Food in the Last Year:    Transportation Needs:     Lack of Transportation (Medical):  Lack of Transportation (Non-Medical):    Physical Activity:     Days of Exercise per Week:     Minutes of Exercise per Session:    Stress:     Feeling of Stress :    Social Connections:     Frequency of Communication with Friends and Family:     Frequency of Social Gatherings with Friends and Family:     Attends [employer]ism Services:     Active Member of Clubs or Organizations:     Attends Club or Organization Meetings:     Marital Status:    Intimate Partner Violence:     Fear of Current or Ex-Partner:     Emotionally Abused:     Physically Abused:     Sexually Abused:           Family History:     Family History   Problem Relation Age of Onset    Other Father         sarcoidosis     REVIEW OF SYSTEMS:      Constitutional:  No weight loss, No fever, No chills, No night sweats. Energy level good.   Eyes:  No impairment or change in vision  ENT / Mouth:  No pain, abnormal ulceration, bleeding, nasal drip or change in voice or hearing  Cardiovascular:  No chest pain, palpitations, new edema, or calf discomfort  Respiratory:  No pain, hemoptysis, change to breathing  Breast:  No pain, discharge, change in appearance or texture  Gastrointestinal:  No pain, cramping, jaundice, change to eating and bowel habits  Urinary:  No pain, bleeding or change in continence  Genitalia: No pain, bleeding or discharge  Musculoskeletal:  No redness, pain, edema or weakness  Skin:  No pruritus, rash, change to nodules or lesions  Neurologic:  No discomfort, change in mental status, speech, sensory or motor activity  Psychiatric:  No change in concentration or change to affect or mood  Endocrine:  No hot flashes, increased thirst, or change to urine production  Hematologic: No petechiae, ecchymosis or bleeding  Lymphatic:  No lymphadenopathy or lymphedema  Allergy / Immunologic:  No eczema, hives, frequent or recurrent infections    PHYSICAL EXAM:      Vitals:  /65   Pulse 73   Temp 99.1 °F (37.3 °C) (Oral)   Resp 16   Ht 5' 8\" (1.727 m)   Wt 180 lb (81.6 kg)   SpO2 95%   BMI 27.37 kg/m²   CONSTITUTIONAL: awake, alert, cooperative, no apparent distress   EYES: pupils equal, round and reactive to light, sclera clear and conjunctiva normal  ENT: Normocephalic, without obvious abnormality, atraumatic  NECK: supple, symmetrical, no jugular venous distension and no carotid bruits   HEMATOLOGIC/LYMPHATIC: no cervical, supraclavicular or axillary lymphadenopathy   LUNGS: no increased work of breathing and clear to auscultation   CARDIOVASCULAR: regular rate and rhythm, normal S1 and S2, no murmur noted  ABDOMEN: normal bowel sounds x 4, soft, non-distended, non-tender, no masses palpated, no hepatosplenomgaly   MUSCULOSKELETAL: full range of motion noted, tone is normal  NEUROLOGIC: awake, alert, oriented to name, place and time. Motor skills grossly intact. SKIN: Normal skin color, texture, turgor and no jaundice. appears intact   EXTREMITIES: no LE edema       DATA:    PT/INR:    Recent Labs     10/17/21  1219   PROT 7.2     PTT:  No results for input(s): APTT in the last 72 hours.   CMP:    Lab Results   Component Value Date     10/17/2021    K 3.9 10/17/2021    K 3.7 10/12/2021     10/17/2021    CO2 24 10/17/2021    BUN 7 10/17/2021    PROT 7.2 10/17/2021     :    Lab Results   Component Value Date    MG 2.00 09/13/2021     Phosphorus:  No components found for: PO4  Calcium:  No components found for: CA  CBC:    Lab Results   Component Value Date    WBC 19.7 10/17/2021    RBC 2.47 10/17/2021    HGB 8.9 10/17/2021    HCT 25.3 10/17/2021    .6 10/17/2021    RDW 22.5 10/17/2021     10/17/2021     DIFF:  Lab Results   Component Value Date    .6 10/17/2021    RDW 22.5 10/17/2021      [email or web address]  Uric Acid:  @labita(URIC)@    Radiology Review:          Problem List  Patient Active Problem List   Diagnosis    [facility] crisis [facility]    Asthma    [facility] crisis [facility]    Sepsis [facility]    Opiate overdose [facility]    Opiate antagonist poisoning, accidental or unintentional, initial encounter    Leukocytosis    Hypoxia    Anemia    Other chest pain    Pneumonia due to infectious organism    Fever    Chronic prescription opiate use    Right leg pain       IMPRESSION/RECOMMENDATIONS:    Sickle cell crisis:  -Added MS Contin 15 mg p.o. every 12. This usually helps his pain.  -Discontinue OxyContin, because he cannot afford this as an outpatient and it does not particularly work well for him.  -Continue as needed oxycodone  -Dilaudid for breakthrough when the oxycodone is not working    Leg pain:  -X-rays negative  -Doppler studies are ordered    Compliance:  -This has been a problem and I have nearly fired him on multiple occasions. I have also had family meetings to address this, but he is an adult.  -He refuses to see a pain specialist.  -He refuses to join a sickle cell group for support  -Per his family, he prefers to sit in his room all day and play video games, which I have told him is unhealthy. He needs other activities outside the house such as a job or volunteer work to help get his mind off the pain. His lifestyle is very unhealthy. His family agrees.  -We have to give him weekly prescriptions for pain medication, because he is noncompliant and will go through them quickly. -I do not think he has a problem with addiction. Narcotics:  -There was a question of whether he was getting narcotics from 3 different physicians  -This was actually 3 different physicians from our office and it was due to me being on vacation.  -He has not violated his pain contract. Thank you for asking me to see the patient.        Yoko Byrnes MD  Please Contact Through [employer]

## 2021-10-18 NOTE — CARE COORDINATION
CASE MANAGEMENT INITIAL ASSESSMENT      Reviewed chart and completed assessment with:patient  Explained Case Management role/services. Primary contact information:[facility] Decision Maker :   Primary Decision Maker: Ciara Goodman - Parent - [phone]    Secondary Decision Maker: Danny Bhagat - Parent - [phone]          Can this person be reached and be able to respond quickly, such as within a few minutes or hours? Yes    Admit date/status:10/17/21  Diagnosis:sickle cell   Is this a Readmission?:  Yes  Home with family    Insurance:[employer]   Precert required for SNF: No       3 night stay required: No    Living arrangements, Adls, care needs, prior to admission:lives in home with parents    [address] at home:  Walker__Cane__RTS__ BSC__Shower Chair__  02__ HHN__ CPAP__  BiPap__  Hospital Bed__ W/C___ Other__________    Services in the home and/or outpatient, prior to [address] (if applicable)   · Name:  · Address:  · Dialysis Schedule:  · Phone:  · Fax:    PT/OT recs:none    Hospital Exemption Notification (HEN):needed for SNF    Barriers to discharge:none    Plan/comments:spoke with patient plans on returning home at discharge. Denied any DCP needs. Will follow with [facility].  Teetee Duarte RN      ECOC on chart for MD signature

## 2021-10-18 NOTE — CONSULTS
Pharmacy to Manage Heparin Infusion per [facility]    Dx: DVT  Pt wt = 81.6 kg. Baseline anti-Xa and/or aPTT = Pending    Oral factor Xa-inhibitors may alter and elevate anti-Xa levels used for unfractionated heparin monitoring. As a result, anti-Xa monitoring is not accurate while Xa-inhibitor activity is detectable. Utilize aPTT monitoring when patient received an oral factor Xa-inhibitor (apixaban, betrixaban, edoxaban or rivaroxaban) within 72 hours prior to admission (please document last administration time). The goal is to allow a washout of oral factor Xa-inhibitors by using aPTT for 72 hours, then change to ant-Xa levels for UFH. High Dose Heparin Infusion  Heparin 80 units/kg IVP bolus followed by Heparin infusion at 18 units/kg/hr (recommended initial max dose 2100 units/hr). Recheck aPTT in 6 hours. Goal anti-Xa 0.3-0.7 IU/mL  Goal aPTT = 60-90 seconds. Catie Og PharmD  10/18/2021 5:20 PM    APTT [[id number]] (Abnormal)    Collected: 10/18/21 2256    Updated: 10/18/21 2315    Specimen Source: Blood     aPTT 67.8High  sec     10/19 0622  aptt = 43.5 sec  6500 bolus; Rate = 18 ml/hr  Change to Anti Xa protocol.   Next Xa 1400  Nick Conde Pharm MARCELINO.10/19/2021 7:31 AM    10/19  Anti-Xa level 0.50 at 1411  Continue Heparin infusion rate at 1800 units/hr  Recheck Anti-Xa level in 6 hours at Rey Villasenor PharmD 10/19/2021  2:46 PM    10/19 2116  Anti-Xa - 0.32  Continue heparin infusion at 1800 units./hr  Next anti-Xa 10/20 AM  Aidan Farah PharmD 10/19/2021 10:10 PM    10/20  Anti-Xa = 0.26 at 0639  Give 3300 units bolus  Increase Heparin infusion rate to 1960 units/hr  Recheck level in 6 hours  Daron Tsang PharmD 10/20/2021  8:05 AM

## 2021-10-18 NOTE — PROGRESS NOTES
Shift assessments completed and charted. Pt is a/o x4, VSS, on RA, not in distress. Educated him in several opportunities today PO narcotics are to be given first than IV forms, patient very upset with this however agreed to take PO before IV. Reports poor pain control, narcotics adjusted this AM by Dr. Carmel Whitaker.

## 2021-10-18 NOTE — PROGRESS NOTES
sclera  Respiratoy:  Normal respiratory effort. Clear to auscultation, bilaterally without Rales/Wheezes/Rhonchi. Cardiovascular: Regular rate and rhythm with normal S1/S2 without murmurs, rubs or gallops. Abdomen: Soft, non-tender, non-distended with normal bowel sounds. Musculoskeletal: No clubbing, cyanosis or edema bilaterally. Full range of motion without deformity. Skin: Skin color, texture, turgor normal.  No rashes or lesions. Neurologic:  Neurovascularly intact without any focal sensory/motor deficits. Cranial nerves: II-XII intact, grossly non-focal.  Psychiatric: Alert and oriented, thought content appropriate, normal insight  Capillary Refill: Brisk,3 seconds, normal   Peripheral Pulses: +2 palpable, equal bilaterally       Labs:   Recent Labs     10/17/21  1219 10/18/21  0917   WBC 19.7* 15.7*   HGB 8.9* 7.5*   HCT 25.3* 22.1*    391     Recent Labs     10/17/21  1219 10/18/21  0917    137   K 3.9 3.3*    103   CO2 24 24   BUN 7 7   CREATININE 0.8* 0.7*   CALCIUM 9.5 9.0   PHOS  --  3.5     Recent Labs     10/17/21  1219   AST 34   ALT 22   BILITOT 4.1*   ALKPHOS 111     No results for input(s): INR in the last 72 hours. No results for input(s): Irene Mould in the last 72 hours.     Urinalysis:      Lab Results   Component Value Date    NITRU Negative 06/09/2021    WBCUA None seen 06/09/2021    BACTERIA Rare 06/09/2021    RBCUA 0-2 06/09/2021    BLOODU TRACE-INTACT 06/09/2021    SPECGRAV 1.010 06/09/2021    GLUCOSEU Negative 06/09/2021       Radiology:  VL Extremity Venous Right         XR CHEST PORTABLE   Final Result   No acute abnormality         XR HIP 2-3 VW W PELVIS RIGHT   Final Result   No acute abnormality                 Assessment/Plan:    Active Hospital Problems    Diagnosis     Right leg pain [M79.604]      Right leg/hip pain- with concerns for sickle cell crisis  - appreciate oncology mgmt plan (defer pain, IVF and pRBC to oncology service)    Right gastrocnemius DVT w/ complete occlusion- started heparin gtt  - defer further mgmt to hematology    No additional IM mgmt at this time. We will sign off but are happy to re consult if needed    DVT Prophylaxis: heparin gtt  Diet: ADULT DIET;  Regular  Code Status: Full Code    PT/OT Eval Status: not ordered    Dispo - pending clinical course above per hematology/oncology    Lizabeth Dean MD

## 2021-10-18 NOTE — PROGRESS NOTES
Pt is alert and oriented. VSS. RA. Pt c/o 9/10 pain. Pt given scheduled pain medication at this time. Shift assessment completed and documented. Call light within reach. Bed side table within reach. Wheels locked. Bed in lowest position. Pt instructed to call out for assistance. Pt expressesed understanding & calls out appropritately. All care per orders. Will continue to monitor.  Electronically signed by Laurie Wylie RN on 10/17/2021 at 9:34 PM

## 2021-10-18 NOTE — PLAN OF CARE
Problem: Pain:  Goal: Pain level will decrease  Description: Pain level will decrease  Outcome: Ongoing  Note: Pt rates pain 9/10. Pt given scheduled pain medication per MAR. Pt is aware of pain medication regimen. Pt instructed to call out when pain medication is needed. Pt given info related to non pharmacological pain management options. Pt verbalized understanding. Will continue to assess and reassess pain.

## 2021-10-19 LAB
ALBUMIN SERPL-MCNC: 3.9 G/DL (ref 3.4–5)
ANION GAP SERPL CALCULATED.3IONS-SCNC: 8 MMOL/L (ref 3–16)
ANTI-XA UNFRAC HEPARIN: 0.32 IU/ML (ref 0.3–0.7)
ANTI-XA UNFRAC HEPARIN: 0.5 IU/ML (ref 0.3–0.7)
APTT: 43.5 SEC (ref 26.2–38.6)
BASOPHILS ABSOLUTE: 0.1 K/UL (ref 0–0.2)
BASOPHILS RELATIVE PERCENT: 0.6 %
BUN BLDV-MCNC: 9 MG/DL (ref 7–20)
CALCIUM SERPL-MCNC: 9.2 MG/DL (ref 8.3–10.6)
CHLORIDE BLD-SCNC: 107 MMOL/L (ref 99–110)
CO2: 25 MMOL/L (ref 21–32)
CREAT SERPL-MCNC: 0.8 MG/DL (ref 0.9–1.3)
EOSINOPHILS ABSOLUTE: 0.3 K/UL (ref 0–0.6)
EOSINOPHILS RELATIVE PERCENT: 2.1 %
GFR AFRICAN AMERICAN: >60
GFR NON-AFRICAN AMERICAN: >60
GLUCOSE BLD-MCNC: 100 MG/DL (ref 70–99)
HCT VFR BLD CALC: 21.7 % (ref 40.5–52.5)
HEMOGLOBIN: 7.4 G/DL (ref 13.5–17.5)
LYMPHOCYTES ABSOLUTE: 4.3 K/UL (ref 1–5.1)
LYMPHOCYTES RELATIVE PERCENT: 31.4 %
MCH RBC QN AUTO: 35.5 PG (ref 26–34)
MCHC RBC AUTO-ENTMCNC: 34.1 G/DL (ref 31–36)
MCV RBC AUTO: 103.9 FL (ref 80–100)
MONOCYTES ABSOLUTE: 1.1 K/UL (ref 0–1.3)
MONOCYTES RELATIVE PERCENT: 8.4 %
NEUTROPHILS ABSOLUTE: 7.8 K/UL (ref 1.7–7.7)
NEUTROPHILS RELATIVE PERCENT: 57.5 %
PDW BLD-RTO: 23.1 % (ref 12.4–15.4)
PHOSPHORUS: 4.4 MG/DL (ref 2.5–4.9)
PLATELET # BLD: 355 K/UL (ref 135–450)
PMV BLD AUTO: 8.3 FL (ref 5–10.5)
POTASSIUM SERPL-SCNC: 3.9 MMOL/L (ref 3.5–5.1)
RBC # BLD: 2.09 M/UL (ref 4.2–5.9)
SODIUM BLD-SCNC: 140 MMOL/L (ref 136–145)
WBC # BLD: 13.6 K/UL (ref 4–11)

## 2021-10-19 PROCEDURE — 2580000003 HC RX 258: Performed by: INTERNAL MEDICINE

## 2021-10-19 PROCEDURE — 80069 RENAL FUNCTION PANEL: CPT

## 2021-10-19 PROCEDURE — 85520 HEPARIN ASSAY: CPT

## 2021-10-19 PROCEDURE — 6360000002 HC RX W HCPCS: Performed by: INTERNAL MEDICINE

## 2021-10-19 PROCEDURE — 96376 TX/PRO/DX INJ SAME DRUG ADON: CPT

## 2021-10-19 PROCEDURE — 36415 COLL VENOUS BLD VENIPUNCTURE: CPT

## 2021-10-19 PROCEDURE — 6370000000 HC RX 637 (ALT 250 FOR IP): Performed by: INTERNAL MEDICINE

## 2021-10-19 PROCEDURE — 85730 THROMBOPLASTIN TIME PARTIAL: CPT

## 2021-10-19 PROCEDURE — G0378 HOSPITAL OBSERVATION PER HR: HCPCS

## 2021-10-19 PROCEDURE — 85025 COMPLETE CBC W/AUTO DIFF WBC: CPT

## 2021-10-19 RX ORDER — HEPARIN SODIUM 1000 [USP'U]/ML
6500 INJECTION, SOLUTION INTRAVENOUS; SUBCUTANEOUS ONCE
Status: COMPLETED | OUTPATIENT
Start: 2021-10-19 | End: 2021-10-19

## 2021-10-19 RX ORDER — HYDROMORPHONE HCL 110MG/55ML
1 PATIENT CONTROLLED ANALGESIA SYRINGE INTRAVENOUS ONCE
Status: COMPLETED | OUTPATIENT
Start: 2021-10-19 | End: 2021-10-19

## 2021-10-19 RX ADMIN — Medication 10 ML: at 09:36

## 2021-10-19 RX ADMIN — SODIUM CHLORIDE, PRESERVATIVE FREE 10 ML: 5 INJECTION INTRAVENOUS at 09:45

## 2021-10-19 RX ADMIN — OXYCODONE 10 MG: 5 TABLET ORAL at 19:51

## 2021-10-19 RX ADMIN — MORPHINE SULFATE 15 MG: 15 TABLET, FILM COATED, EXTENDED RELEASE ORAL at 09:35

## 2021-10-19 RX ADMIN — FOLIC ACID 2 MG: 1 TABLET ORAL at 09:35

## 2021-10-19 RX ADMIN — MORPHINE SULFATE 15 MG: 15 TABLET, FILM COATED, EXTENDED RELEASE ORAL at 23:01

## 2021-10-19 RX ADMIN — HYDROMORPHONE HYDROCHLORIDE 1 MG: 2 INJECTION, SOLUTION INTRAMUSCULAR; INTRAVENOUS; SUBCUTANEOUS at 06:08

## 2021-10-19 RX ADMIN — HYDROMORPHONE HYDROCHLORIDE 1 MG: 2 INJECTION, SOLUTION INTRAMUSCULAR; INTRAVENOUS; SUBCUTANEOUS at 21:15

## 2021-10-19 RX ADMIN — HYDROMORPHONE HYDROCHLORIDE 1 MG: 2 INJECTION, SOLUTION INTRAMUSCULAR; INTRAVENOUS; SUBCUTANEOUS at 16:47

## 2021-10-19 RX ADMIN — HYDROXYUREA 1000 MG: 500 CAPSULE ORAL at 07:41

## 2021-10-19 RX ADMIN — KETOROLAC TROMETHAMINE 30 MG: 30 INJECTION, SOLUTION INTRAMUSCULAR at 19:51

## 2021-10-19 RX ADMIN — HEPARIN SODIUM 6500 UNITS: 1000 INJECTION INTRAVENOUS; SUBCUTANEOUS at 07:41

## 2021-10-19 RX ADMIN — OXYCODONE 10 MG: 5 TABLET ORAL at 11:36

## 2021-10-19 RX ADMIN — OXYCODONE 10 MG: 5 TABLET ORAL at 15:44

## 2021-10-19 RX ADMIN — HEPARIN SODIUM AND DEXTROSE 1800 UNITS/HR: 10000; 5 INJECTION INTRAVENOUS at 09:44

## 2021-10-19 RX ADMIN — HYDROMORPHONE HYDROCHLORIDE 1 MG: 2 INJECTION, SOLUTION INTRAMUSCULAR; INTRAVENOUS; SUBCUTANEOUS at 09:35

## 2021-10-19 RX ADMIN — KETOROLAC TROMETHAMINE 30 MG: 30 INJECTION, SOLUTION INTRAMUSCULAR at 14:22

## 2021-10-19 RX ADMIN — KETOROLAC TROMETHAMINE 30 MG: 30 INJECTION, SOLUTION INTRAMUSCULAR at 09:35

## 2021-10-19 RX ADMIN — OXYCODONE 10 MG: 5 TABLET ORAL at 05:33

## 2021-10-19 RX ADMIN — HYDROMORPHONE HYDROCHLORIDE 1 MG: 2 INJECTION, SOLUTION INTRAMUSCULAR; INTRAVENOUS; SUBCUTANEOUS at 02:36

## 2021-10-19 RX ADMIN — OXYCODONE 10 MG: 5 TABLET ORAL at 01:18

## 2021-10-19 RX ADMIN — OXYCODONE 10 MG: 5 TABLET ORAL at 23:54

## 2021-10-19 RX ADMIN — KETOROLAC TROMETHAMINE 30 MG: 30 INJECTION, SOLUTION INTRAMUSCULAR at 03:20

## 2021-10-19 RX ADMIN — SODIUM CHLORIDE, PRESERVATIVE FREE 10 ML: 5 INJECTION INTRAVENOUS at 19:51

## 2021-10-19 ASSESSMENT — PAIN SCALES - GENERAL
PAINLEVEL_OUTOF10: 9
PAINLEVEL_OUTOF10: 8
PAINLEVEL_OUTOF10: 9
PAINLEVEL_OUTOF10: 8
PAINLEVEL_OUTOF10: 8
PAINLEVEL_OUTOF10: 9
PAINLEVEL_OUTOF10: 10
PAINLEVEL_OUTOF10: 7
PAINLEVEL_OUTOF10: 8
PAINLEVEL_OUTOF10: 7
PAINLEVEL_OUTOF10: 9
PAINLEVEL_OUTOF10: 8
PAINLEVEL_OUTOF10: 8
PAINLEVEL_OUTOF10: 10
PAINLEVEL_OUTOF10: 10
PAINLEVEL_OUTOF10: 9
PAINLEVEL_OUTOF10: 8

## 2021-10-19 NOTE — PROGRESS NOTES
Pt assessment completed and documented. VSS on RA. Pt A&O x 4. Heparin gtt infusing, see MAR. Pt medicated for pain per MAR. Pt ambulates independently. Bed in lowest position and wheels locked. Non-skid footwear in place. Call light and bedside table within reach. Pt calls out appropriately and denies any other needs at this time.

## 2021-10-19 NOTE — PROGRESS NOTES
PS to Dr. Carmel Whitaker:     [address] or Facility: [facility] From: Vi Gonsalez RE: [facility] RM: 331 Pt here for sickle cell and DVT. Rona Day was rating pain 8-9 earlier in the shift but was calm, now yelling out in pain rating 10+ specifically in that right leg. I've given him all PRNs all night long. . any other suggestions?  Thanks!! Need Callback: NO CALLBACK REQ C3 ONCOLOGY ROUTINE\"     \"One extra dose of dilaudid ok\"

## 2021-10-19 NOTE — PROGRESS NOTES
ONCOLOGY HEMATOLOGY CARE PROGRESS NOTE      SUBJECTIVE:       He is sitting eating lunch in bed. Feels better and thinks he can go home tomorrow. ROS:     Constitutional:  No weight loss, No fever, No chills, No night sweats. Energy level good. Eyes:  No impairment or change in vision  ENT / Mouth:  No pain, abnormal ulceration, bleeding, nasal drip or change in voice or hearing  Cardiovascular:  No chest pain, palpitations, new edema, or calf discomfort  Respiratory:  No pain, hemoptysis, change to breathing  Breast:  No pain, discharge, change in appearance or texture  Gastrointestinal:  No pain, cramping, jaundice, change to eating and bowel habits  Urinary:  No pain, bleeding or change in continence  Genitalia: No pain, bleeding or discharge  Musculoskeletal:  No redness, pain, edema or weakness  Skin:  No pruritus, rash, change to nodules or lesions  Neurologic:  No discomfort, change in mental status, speech, sensory or motor activity  Psychiatric:  No change in concentration or change to affect or mood  Endocrine:  No hot flashes, increased thirst, or change to urine production  Hematologic: No petechiae, ecchymosis or bleeding  Lymphatic:  No lymphadenopathy or lymphedema  Allergy / Immunologic:  No eczema, hives, frequent or recurrent infections    OBJECTIVE        Physical    VITALS:  /66   Pulse 92   Temp 98.4 °F (36.9 °C) (Oral)   Resp 16   Ht 5' 8\" (1.727 m)   Wt 180 lb (81.6 kg)   SpO2 96%   BMI 27.37 kg/m²   TEMPERATURE:  Current - Temp: 98.4 °F (36.9 °C);  Max - Temp  Av.3 °F (36.8 °C)  Min: 97.5 °F (36.4 °C)  Max: 99 °F (37.2 °C)  PULSE OXIMETRY RANGE: SpO2  Av.8 %  Min: 96 %  Max: 98 %  24HR INTAKE/OUTPUT:    Intake/Output Summary (Last 24 hours) at 10/19/2021 1240  Last data filed at 10/19/2021 0945  Gross per 24 hour   Intake 941.87 ml   Output 2550 ml   Net -1608.13 ml       CONSTITUTIONAL: awake, alert, cooperative, no apparent distress   EYES: pupils equal, round and reactive to light, sclera clear and conjunctiva normal  ENT: Normocephalic, without obvious abnormality, atraumatic  NECK: supple, symmetrical, no jugular venous distension and no carotid bruits   HEMATOLOGIC/LYMPHATIC: no cervical, supraclavicular or axillary lymphadenopathy   LUNGS: no increased work of breathing and clear to auscultation   CARDIOVASCULAR: regular rate and rhythm, normal S1 and S2, no murmur noted  ABDOMEN: normal bowel sounds x 4, soft, non-distended, non-tender, no masses palpated, no hepatosplenomgaly   MUSCULOSKELETAL: full range of motion noted, tone is normal  NEUROLOGIC: awake, alert, oriented to name, place and time. Motor skills grossly intact. SKIN: Normal skin color, texture, turgor and no jaundice.  appears intact   EXTREMITIES: no LE edema       Data      Recent Labs     10/17/21  1219 10/18/21  0917 10/19/21  0622   WBC 19.7* 15.7* 13.6*   HGB 8.9* 7.5* 7.4*   HCT 25.3* 22.1* 21.7*    391 355   .6* 103.7* 103.9*        Recent Labs     10/17/21  1219 10/18/21  0917 10/19/21  0622    137 140   K 3.9 3.3* 3.9    103 107   CO2 24 24 25   PHOS  --  3.5 4.4   BUN 7 7 9   CREATININE 0.8* 0.7* 0.8*     Recent Labs     10/17/21  1219   AST 34   ALT 22   BILITOT 4.1*   ALKPHOS 111       Magnesium:    Lab Results   Component Value Date    MG 2.00 09/13/2021    MG 1.80 05/05/2021    MG 1.80 05/04/2021         Problem List  Patient Active Problem List   Diagnosis    [facility] [address]    Asthma    [facility] crisis [facility] [address]    Sepsis [facility] [address]    Opiate overdose [facility] [address]    Opiate antagonist poisoning, accidental or unintentional, initial encounter    Leukocytosis    Hypoxia    Anemia    Other chest pain    Pneumonia due to infectious organism    Fever    Chronic prescription opiate use    Right leg pain       ASSESSMENT AND PLAN:        Sickle Cell  - CBC stable on fluids  - Dilaudid IV every 4 hours -

## 2021-10-19 NOTE — CARE COORDINATION
Chart reviewed. Patient with poor pain control over night. D/c pending pain improvement. No DCP needs.  Karli Grijalva RN

## 2021-10-19 NOTE — PROGRESS NOTES
Shift assessments completed and charted. Pt is a/o x4, VSS, on RA, not in distress. Reports poor pain control, scheduled and prn analgesics/narcotics given.

## 2021-10-20 VITALS
SYSTOLIC BLOOD PRESSURE: 108 MMHG | BODY MASS INDEX: 27.28 KG/M2 | DIASTOLIC BLOOD PRESSURE: 64 MMHG | RESPIRATION RATE: 16 BRPM | TEMPERATURE: 97.8 F | HEART RATE: 77 BPM | HEIGHT: 68 IN | OXYGEN SATURATION: 98 % | WEIGHT: 180 LBS

## 2021-10-20 LAB
ALBUMIN SERPL-MCNC: 4.2 G/DL (ref 3.4–5)
ANION GAP SERPL CALCULATED.3IONS-SCNC: 7 MMOL/L (ref 3–16)
ANTI-XA UNFRAC HEPARIN: 0.26 IU/ML (ref 0.3–0.7)
BASOPHILS ABSOLUTE: 0.1 K/UL (ref 0–0.2)
BASOPHILS RELATIVE PERCENT: 0.5 %
BUN BLDV-MCNC: 8 MG/DL (ref 7–20)
CALCIUM SERPL-MCNC: 9.5 MG/DL (ref 8.3–10.6)
CHLORIDE BLD-SCNC: 104 MMOL/L (ref 99–110)
CO2: 28 MMOL/L (ref 21–32)
CREAT SERPL-MCNC: 0.8 MG/DL (ref 0.9–1.3)
EOSINOPHILS ABSOLUTE: 0.3 K/UL (ref 0–0.6)
EOSINOPHILS RELATIVE PERCENT: 2.1 %
GFR AFRICAN AMERICAN: >60
GFR NON-AFRICAN AMERICAN: >60
GLUCOSE BLD-MCNC: 101 MG/DL (ref 70–99)
HCT VFR BLD CALC: 22.1 % (ref 40.5–52.5)
HEMOGLOBIN: 7.7 G/DL (ref 13.5–17.5)
LYMPHOCYTES ABSOLUTE: 3.4 K/UL (ref 1–5.1)
LYMPHOCYTES RELATIVE PERCENT: 28.1 %
MCH RBC QN AUTO: 36.7 PG (ref 26–34)
MCHC RBC AUTO-ENTMCNC: 34.8 G/DL (ref 31–36)
MCV RBC AUTO: 105.6 FL (ref 80–100)
MONOCYTES ABSOLUTE: 1.2 K/UL (ref 0–1.3)
MONOCYTES RELATIVE PERCENT: 10.1 %
NEUTROPHILS ABSOLUTE: 7.2 K/UL (ref 1.7–7.7)
NEUTROPHILS RELATIVE PERCENT: 59.2 %
PDW BLD-RTO: 22.4 % (ref 12.4–15.4)
PHOSPHORUS: 4.5 MG/DL (ref 2.5–4.9)
PLATELET # BLD: 386 K/UL (ref 135–450)
PMV BLD AUTO: 8.1 FL (ref 5–10.5)
POTASSIUM SERPL-SCNC: 4.2 MMOL/L (ref 3.5–5.1)
RBC # BLD: 2.1 M/UL (ref 4.2–5.9)
SODIUM BLD-SCNC: 139 MMOL/L (ref 136–145)
WBC # BLD: 12.2 K/UL (ref 4–11)

## 2021-10-20 PROCEDURE — 80069 RENAL FUNCTION PANEL: CPT

## 2021-10-20 PROCEDURE — G0378 HOSPITAL OBSERVATION PER HR: HCPCS

## 2021-10-20 PROCEDURE — 6370000000 HC RX 637 (ALT 250 FOR IP): Performed by: NURSE PRACTITIONER

## 2021-10-20 PROCEDURE — 85520 HEPARIN ASSAY: CPT

## 2021-10-20 PROCEDURE — 36415 COLL VENOUS BLD VENIPUNCTURE: CPT

## 2021-10-20 PROCEDURE — 6360000002 HC RX W HCPCS: Performed by: INTERNAL MEDICINE

## 2021-10-20 PROCEDURE — 96376 TX/PRO/DX INJ SAME DRUG ADON: CPT

## 2021-10-20 PROCEDURE — 2580000003 HC RX 258: Performed by: INTERNAL MEDICINE

## 2021-10-20 PROCEDURE — 6370000000 HC RX 637 (ALT 250 FOR IP): Performed by: INTERNAL MEDICINE

## 2021-10-20 PROCEDURE — 6360000002 HC RX W HCPCS: Performed by: NURSE PRACTITIONER

## 2021-10-20 PROCEDURE — 85025 COMPLETE CBC W/AUTO DIFF WBC: CPT

## 2021-10-20 RX ORDER — HEPARIN SODIUM 1000 [USP'U]/ML
3300 INJECTION, SOLUTION INTRAVENOUS; SUBCUTANEOUS ONCE
Status: COMPLETED | OUTPATIENT
Start: 2021-10-20 | End: 2021-10-20

## 2021-10-20 RX ADMIN — SODIUM CHLORIDE, PRESERVATIVE FREE 10 ML: 5 INJECTION INTRAVENOUS at 07:59

## 2021-10-20 RX ADMIN — OXYCODONE 10 MG: 5 TABLET ORAL at 14:53

## 2021-10-20 RX ADMIN — KETOROLAC TROMETHAMINE 30 MG: 30 INJECTION, SOLUTION INTRAMUSCULAR at 07:57

## 2021-10-20 RX ADMIN — MORPHINE SULFATE 15 MG: 15 TABLET, FILM COATED, EXTENDED RELEASE ORAL at 07:58

## 2021-10-20 RX ADMIN — HYDROXYUREA 1000 MG: 500 CAPSULE ORAL at 07:59

## 2021-10-20 RX ADMIN — HEPARIN SODIUM AND DEXTROSE 1800 UNITS/HR: 10000; 5 INJECTION INTRAVENOUS at 02:54

## 2021-10-20 RX ADMIN — OXYCODONE 10 MG: 5 TABLET ORAL at 04:44

## 2021-10-20 RX ADMIN — KETOROLAC TROMETHAMINE 30 MG: 30 INJECTION, SOLUTION INTRAMUSCULAR at 02:50

## 2021-10-20 RX ADMIN — HYDROMORPHONE HYDROCHLORIDE 1 MG: 2 INJECTION, SOLUTION INTRAMUSCULAR; INTRAVENOUS; SUBCUTANEOUS at 01:18

## 2021-10-20 RX ADMIN — FOLIC ACID 2 MG: 1 TABLET ORAL at 07:59

## 2021-10-20 RX ADMIN — HEPARIN SODIUM 3300 UNITS: 1000 INJECTION INTRAVENOUS; SUBCUTANEOUS at 08:27

## 2021-10-20 RX ADMIN — KETOROLAC TROMETHAMINE 30 MG: 30 INJECTION, SOLUTION INTRAMUSCULAR at 14:35

## 2021-10-20 RX ADMIN — APIXABAN 10 MG: 5 TABLET, FILM COATED ORAL at 10:12

## 2021-10-20 RX ADMIN — HYDROMORPHONE HYDROCHLORIDE 1 MG: 2 INJECTION, SOLUTION INTRAMUSCULAR; INTRAVENOUS; SUBCUTANEOUS at 10:08

## 2021-10-20 RX ADMIN — HYDROMORPHONE HYDROCHLORIDE 1 MG: 2 INJECTION, SOLUTION INTRAMUSCULAR; INTRAVENOUS; SUBCUTANEOUS at 05:47

## 2021-10-20 ASSESSMENT — PAIN SCALES - GENERAL
PAINLEVEL_OUTOF10: 8
PAINLEVEL_OUTOF10: 7
PAINLEVEL_OUTOF10: 8
PAINLEVEL_OUTOF10: 10
PAINLEVEL_OUTOF10: 8
PAINLEVEL_OUTOF10: 8

## 2021-10-20 NOTE — PROGRESS NOTES
Pt a/o. Pt complains of no nausea; no emesis. Taking in both PO fluids and food. Pt stated pain, pt requesting the IV pain medication over the PO pain medication. Writer entered room when pt was talking to [address] [place] [address] about discharge and discontinuation of IV pain medication. Post conversation, pt stated he did not want his PO prn pain medication and was eager to discharge to help his dad move. Call light within reach; will continue to monitor.

## 2021-10-20 NOTE — PROGRESS NOTES
Pt assessment completed and charted. VSS. Pt a/ox4. Pt reports pain 9/10 in R leg. Pt educated on prn and scheduled pain meds, meds given according to mar. Pt uses urinal at bedside. Pt denies any other needs at this time. Pt calls out appropriately. Pt is a fall risk;  -Bed in lowest position and wheels locked. -Call light within reach.   -Bedside table within reach.   -Non-skid footwear in place.

## 2021-10-20 NOTE — PROGRESS NOTES
PIV removed. Writer picked up pt's Eliquis medication at the [facility] and gave it directly to the pt. Discharge instructions given to pt and all questions answered. Pt has his Eliquis medication from the pharmacy, his belongings and discharge paperwork. Pt refused to be wheeled out in a wheelchair. Pt's mother driving pt home. PT discharged in stable condition.

## 2021-10-20 NOTE — DISCHARGE SUMMARY
Hospital Discharge Summary     NAME: Kurtis Obrien    :  2000    MRN:  [id number]    Admission Details:     Admit date:  10/17/2021    Admitting Physician:  No admitting provider for patient encounter. Primary Care Physician:  Serafin Hamm MD    Discharge Details:     Discharge date:   10-20-21   DC condition: stable /good   Discharge Diagnoses:    Patient Active Problem List   Diagnosis    [facility] crisis [facility] [address]    Asthma    [facility] cell crisis ([facility] [address]    Sepsis ([facility] [address]    Opiate overdose ([facility] [address]    Opiate antagonist poisoning, accidental or unintentional, initial encounter    Leukocytosis    Hypoxia    Anemia    Other chest pain    Pneumonia due to infectious organism    Fever    Chronic prescription opiate use    Right leg pain       Hospital Course:   Admitted for acute pain crisis. He was treated with IV Dilaudid 2 mg every 4 hours. He was given LA Morphine as well. His pain is well controlled now and wants to leave. He does have a new DVT and Hep drip started- will dc on Eliquis. He is high risk for narcotic overdose as he has overdosed on Methadone in the past.     He will not be given LA narcotics at home- in patient use only with supervision. He was given 60 tabs Oxy IR on 10-14 and reports running out on 10-17 or . Will not refill this at this time. Patient requested to leave immediately once he learned no narcotics scripts would be given and even refused the Oxy IR pill which was ready to be given at the bedside. He is aware of the importance of Eliquis. This was sent to the [facility] and he will wait until he has this script prior to dc. He will take a loading dose of Eliquis 10 mg PO BID X 7 days and then 5 mg PO BID for at least 3 months, maybe longer and ideally life long given his hx of sickle cell. Rachana Matias is prone to [employer] for narcotics.  He was told by Dr. Jewel Bangura he would not receive LA Morphine outpatient- however, Lobito Castro continues to ask me for this script. Also concerning that he wanted to leave immediately upon learning no narcotic scripts would be given, that his IV Dilaudid was stopped, and even refused Oxy IR while in the hospital.     Discussed with Dr. Carmel Whitaker. PE:   Alert, oriented, drowsy but awakens easily   HEENT: sclera non icteric   Lungs resp even and non labored   Cardiac; RRR normal S1 and s2  Abd; soft non tender, +BS, no organomegaly   Skin: appropriate for ethnicity   Neuro: grossly intact   MS: generalized weakness     Discharge Medications:       [place], [address] Medication Instructions [id number]    Printed on:10/20/21 7815   Medication Information                      albuterol sulfate HFA (PROAIR HFA) 108 (90 Base) MCG/ACT inhaler  Albuterol HFA Inhaler 90 mcg/actuation  inhaled  2 puffs prn     Active             apixaban (ELIQUIS) 5 MG TABS tablet  Take 2 tablets by mouth 2 times daily for 13 doses             docusate (COLACE, DULCOLAX) 100 MG CAPS  Take 100 mg by mouth             folic acid (FOLVITE) 1 MG tablet  Take 2 tablets by mouth daily             hydroxyurea (HYDREA) 500 MG chemo capsule  Take 1,000 mg by mouth daily              ibuprofen (ADVIL;MOTRIN) 800 MG tablet  ibuprofen 800 MG Oral Tablet  oral   prn    Active             levalbuterol (XOPENEX) 0.31 MG/3ML nebulization  Levalbuterol Nebulized    2 puffs prn     Active             oxyCODONE HCl (OXY-IR) 10 MG immediate release tablet  Take 10 mg by mouth every 4 hours as needed for Pain. Significant Diagnostic Studies/Imaging:     XR CHEST PORTABLE    Result Date: 10/17/2021  EXAMINATION: ONE XRAY VIEW OF THE CHEST 10/17/2021 1:31 pm COMPARISON: 09/28/2021 HISTORY: ORDERING SYSTEM PROVIDED HISTORY: sickle cell TECHNOLOGIST PROVIDED HISTORY: Reason for exam:->sickle cell Reason for Exam: Sickle Cell Pain Crisis FINDINGS: The heart is borderline enlarged but stable.   There are no focal infiltrates or pleural effusions. The osseous structures appear intact. No acute abnormality     XR CHEST PORTABLE    Result Date: 9/28/2021  EXAMINATION: ONE XRAY VIEW OF THE CHEST 9/28/2021 2:22 am COMPARISON: 08/25/2021 HISTORY: ORDERING SYSTEM PROVIDED HISTORY: back pain TECHNOLOGIST PROVIDED HISTORY: Reason for exam:->back pain FINDINGS: Heart size and configuration are normal.  The lungs are clear. No pneumothorax or pleural fluid. No acute bone finding. No acute cardiopulmonary disease. VL Extremity Venous Right    Result Date: 10/20/2021  Lower Extremities DVT Study  Demographics   Patient Name       Geradine Frankel   Date of Study      10/18/2021         Gender              Male   Patient Number     [id number]         Date of Birth       2000   Visit Number       [id number]          Age                 24 year(s)   Accession Number   [id number]         Room Number         8125   Corporate ID       [id number]           Sonographer         Fracisco Jacobs RVT   Ordering Physician Austin Anaya MD       Physician           Readers                                                            Petrona Brown MD  Procedure Type of Study:   Veins:Lower Extremities DVT Study, VL EXTREMITY VENOUS DUPLEX RIGHT. Vascular Sonographer Report  Indications for Study:Leg pain. Additional Indications:Patient complains of right thigh pain radiating down leg x 3 days, denies known injury, denies recent travel. Venous Duplex Scan: B-mode imaging of the deep and superficial veins, with compression maneuvers, including color and Doppler spectral waveform analysis. Impressions Right Impression Acute totally occluding deep vein thrombosis involving the right gastrocnemius vein.  No other evidence of deep vein or superficial vein thrombosis involving the right lower extremity. Left Impression No evidence of deep vein thrombosis involving the left common femoral vein. Conclusions   Summary   Acute totally occluding deep vein thrombosis involving the right  gastrocnemius vein. No other evidence of deep vein or superficial vein thrombosis involving the  right lower extremity and the left common femoral vein. Signature   ------------------------------------------------------------------  Electronically signed by Suellen Gottron, MD (Interpreting  physician) on 10/20/2021 at 07:43 AM  ------------------------------------------------------------------  Patient Status:Routine. Study [facility] - Vascular Lab. Technical Quality:Adequate visualization.   - Results were reported to:SHAKEEL Erazo at 15:47 on 10/18/21. Velocities are measured in cm/s ; Diameters are measured in mm Right Lower Extremities DVT Study Measurements Right 2D Measurements +------------------------+----------+---------------+----------+ ! Location                ! Visualized! Compressibility! Thrombosis! +------------------------+----------+---------------+----------+ ! Sapheno Femoral Junction! Yes       ! Yes            ! None      ! +------------------------+----------+---------------+----------+ ! GSV Thigh               ! Yes       ! Yes            ! None      ! +------------------------+----------+---------------+----------+ ! Common Femoral          !Yes       ! Yes            ! None      ! +------------------------+----------+---------------+----------+ ! Femoral                 !Yes       ! Yes            ! None      ! +------------------------+----------+---------------+----------+ ! Prox Femoral            !Yes       ! Yes            ! None      ! +------------------------+----------+---------------+----------+ ! Mid Femoral             !Yes       ! Yes            ! None      ! +------------------------+----------+---------------+----------+ ! Dist Femoral            !Yes       ! Yes            ! None      ! +------------------------+----------+---------------+----------+ ! Deep Femoral            !Yes       ! Yes            ! None      ! +------------------------+----------+---------------+----------+ ! Popliteal               !Yes       ! Yes            ! None      ! +------------------------+----------+---------------+----------+ ! GSV Below Knee          ! Yes       ! Yes            ! None      ! +------------------------+----------+---------------+----------+ ! Gastroc                 ! Yes       ! No             !Acute     ! +------------------------+----------+---------------+----------+ ! Soleal                  !Yes       ! Yes            ! None      ! +------------------------+----------+---------------+----------+ ! PTV                     ! Yes       ! Yes            ! None      ! +------------------------+----------+---------------+----------+ ! Peroneal                !Yes       ! Yes            ! None      ! +------------------------+----------+---------------+----------+ ! GSV Calf                ! Yes       ! Yes            ! None      ! +------------------------+----------+---------------+----------+ Right Doppler Measurements +------------------------+------+------+------------+ ! Location                ! Signal!Reflux! Reflux (sec)! +------------------------+------+------+------------+ ! Sapheno Femoral Junction! Phasic! No    !            ! +------------------------+------+------+------------+ ! Common Femoral          !Phasic! No    !            ! +------------------------+------+------+------------+ ! Femoral                 !Phasic! No    !            ! +------------------------+------+------+------------+ ! Deep Femoral            !Phasic! No    !            ! +------------------------+------+------+------------+ ! Popliteal               !Phasic! No    !            ! +------------------------+------+------+------------+ Left Lower Extremities DVT Study Measurements Left 2D Measurements +--------------+----------+---------------+----------+ ! Location      ! Visualized! Compressibility! Thrombosis! +--------------+----------+---------------+----------+ ! Common Femoral!Yes       ! Yes            ! None      ! +--------------+----------+---------------+----------+ Left Doppler Measurements +--------------+------+------+------------+ ! Location      ! Signal!Reflux! Reflux (sec)! +--------------+------+------+------------+ ! Common Femoral!Phasic! No    !            ! +--------------+------+------+------------+    XR HIP 2-3 VW W PELVIS RIGHT    Result Date: 10/17/2021  EXAMINATION: ONE XRAY VIEW OF THE PELVIS AND TWO XRAY VIEWS RIGHT HIP 10/17/2021 1:31 pm COMPARISON: None. HISTORY: ORDERING SYSTEM PROVIDED HISTORY: right hip pain TECHNOLOGIST PROVIDED HISTORY: Reason for exam:->right hip pain Reason for Exam: Sickle Cell Pain Crisis - right hip pain FINDINGS: The soft tissues are unremarkable. There is no fracture or dislocation. No focal destructive osseous lesion. No radiopaque foreign body is identified. No acute abnormality       Treatments:     IV dilaudid   Hep drip to Eliquis   MS Contin 15 BID     Disposition:     [facility] should be follow up with Miracle Snyder MD in one week.        Signed:     10/20/2021  11:48 AM    Brett Lindsay CNP   Medical Oncology/Hematology    [facility] [place] [address] [facility],  [address]  Phone: [phone]  Fax: [phone]

## 2021-10-21 LAB
BLOOD CULTURE, ROUTINE: NORMAL
CULTURE, BLOOD 2: NORMAL

## 2021-10-22 ENCOUNTER — APPOINTMENT (OUTPATIENT)
Dept: GENERAL RADIOLOGY | Age: 21
End: 2021-10-22
Payer: COMMERCIAL

## 2021-10-22 ENCOUNTER — HOSPITAL ENCOUNTER (EMERGENCY)
Age: 21
Discharge: HOME | End: 2021-10-22
Attending: EMERGENCY MEDICINE
Payer: COMMERCIAL

## 2021-10-22 VITALS
TEMPERATURE: 98.5 F | SYSTOLIC BLOOD PRESSURE: 118 MMHG | DIASTOLIC BLOOD PRESSURE: 69 MMHG | OXYGEN SATURATION: 98 % | BODY MASS INDEX: 27.28 KG/M2 | RESPIRATION RATE: 18 BRPM | HEART RATE: 97 BPM | WEIGHT: 180 LBS | HEIGHT: 68 IN

## 2021-10-22 DIAGNOSIS — S80.11XA CONTUSION OF RIGHT LOWER EXTREMITY, INITIAL ENCOUNTER: Primary | ICD-10-CM

## 2021-10-22 PROCEDURE — 99283 EMERGENCY DEPT VISIT LOW MDM: CPT

## 2021-10-22 PROCEDURE — 73560 X-RAY EXAM OF KNEE 1 OR 2: CPT

## 2021-10-22 PROCEDURE — 73502 X-RAY EXAM HIP UNI 2-3 VIEWS: CPT

## 2021-10-22 PROCEDURE — 6370000000 HC RX 637 (ALT 250 FOR IP): Performed by: EMERGENCY MEDICINE

## 2021-10-22 RX ORDER — OXYCODONE HYDROCHLORIDE 5 MG/1
10 TABLET ORAL ONCE
Status: COMPLETED | OUTPATIENT
Start: 2021-10-22 | End: 2021-10-22

## 2021-10-22 RX ADMIN — OXYCODONE 10 MG: 5 TABLET ORAL at 22:44

## 2021-10-22 ASSESSMENT — PAIN SCALES - GENERAL
PAINLEVEL_OUTOF10: 8
PAINLEVEL_OUTOF10: 8

## 2021-10-23 NOTE — ED PROVIDER NOTES
Emergency Department Provider Note  Location: [facility]  ED  10/22/2021     Patient Identification  Anel Jain is a 24 y.o. male    Chief Complaint  Leg Pain (R leg dx DVT 10-19-21, taking Eliquis BID, 8/10, radiating R hip to R knee)          HPI  (History provided by patient)  Patient is a 79-year-old male with diagnosis of sickle cell disease and right leg DVT diagnosed  who presents for complaint of leg pain. Patient reports blunt injury to his hip. He is reluctant to share any details. States that \"my family is worried that my pain would be uncontrolled so they thought I should come here\". Patient denies any out of proportion pain. He denies any bruising or swelling. When pressed he states that a piece of furniture fell off the wall that was leaned against the wall and hit him on the hip. No other injuries. He is on Eliquis. I have reviewed the following nursing documentation:  Allergies: Allergies   Allergen Reactions    Morphine Shortness Of Breath     Other reaction(s): Histamine-like Reaction  Has asthma exacerbation with morphine-histamine type reaction         Past medical history:  has a past medical history of Accidental overdose, Asthma, DVT (deep venous thrombosis) ([facility] [address] (10/19/2021), Enlarged heart, 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 ([address] and Sickle cell anemia ([facility] [address] Past surgical history:  has a past surgical history that includes Splenectomy. Home medications:   Prior to Admission medications    Medication Sig Start Date End Date Taking? Authorizing Provider   apixaban (ELIQUIS) 5 MG TABS tablet Take 2 tablets by mouth 2 times daily for 13 doses 10/20/21 10/27/21  Loria Apgar, APRN - CNP   folic acid (FOLVITE) 1 MG tablet Take 2 tablets by mouth daily 21   Loria Apgar, APRN - CNP   oxyCODONE HCl (OXY-IR) 10 MG immediate release tablet Take 10 mg by mouth every 4 hours as needed for Pain.     Historical Provider, MD albuterol sulfate HFA (PROAIR HFA) 108 (90 Base) MCG/ACT inhaler Albuterol HFA Inhaler 90 mcg/actuation  inhaled  2 puffs prn     Active    Historical Provider, MD   levalbuterol (XOPENEX) 0.31 MG/3ML nebulization Levalbuterol Nebulized    2 puffs prn     Active    Historical Provider, MD   docusate (COLACE, DULCOLAX) 100 MG CAPS Take 100 mg by mouth    Historical Provider, MD   ibuprofen (ADVIL;MOTRIN) 800 MG tablet ibuprofen 800 MG Oral Tablet  oral   prn    Active    Historical Provider, MD   hydroxyurea (HYDREA) 500 MG chemo capsule Take 1,000 mg by mouth daily  12/9/20   Henry Robbins MD       Social history:  reports that he has never smoked. He has never used smokeless tobacco. He reports previous alcohol use. He reports that he does not use drugs. Family history:    Family History   Problem Relation Age of Onset    Other Father         sarcoidosis         ROS  Review of Systems   Constitutional: Negative for chills and fever. HENT: Negative for congestion and rhinorrhea. Eyes: Negative for photophobia and visual disturbance. Respiratory: Negative for cough, shortness of breath and wheezing. Cardiovascular: Negative for chest pain and palpitations. Gastrointestinal: Negative for abdominal pain, diarrhea, nausea and vomiting. Genitourinary: Negative for dysuria and hematuria. Musculoskeletal: Positive for arthralgias. Negative for back pain and neck pain. Skin: Negative for rash and wound. Neurological: Negative for syncope and weakness. Psychiatric/Behavioral: Negative for agitation and confusion. Exam  ED Triage Vitals [10/22/21 2120]   BP Temp Temp Source Pulse Resp SpO2 Height Weight   138/66 98.5 °F (36.9 °C) Oral 100 16 98 % 5' 8\" (1.727 m) 180 lb (81.6 kg)       Physical Exam  Vitals and nursing note reviewed. Constitutional:       General: He is not in acute distress. Appearance: He is well-developed. HENT:      Head: Normocephalic and atraumatic. Nose: Nose normal. No congestion. Eyes:      Extraocular Movements: Extraocular movements intact. Pupils: Pupils are equal, round, and reactive to light. Cardiovascular:      Rate and Rhythm: Normal rate and regular rhythm. Heart sounds: No murmur heard. Pulmonary:      Effort: Pulmonary effort is normal.      Breath sounds: Normal breath sounds. Abdominal:      General: There is no distension. Palpations: Abdomen is soft. Tenderness: There is no abdominal tenderness. Musculoskeletal:         General: No deformity. Normal range of motion. Cervical back: Normal range of motion and neck supple. Comments: Normal range of motion of the lower extremities. There is no tenderness over the hip soft thigh compartments normal range of motion of the knee. Skin:     General: Skin is warm. Findings: No rash. Neurological:      Mental Status: He is alert and oriented to person, place, and time. Motor: No abnormal muscle tone. Coordination: Coordination normal.   Psychiatric:         Mood and Affect: Mood normal.         Behavior: Behavior normal.           ED Course    ED Medication Orders (From admission, onward)    Start Ordered     Status Ordering Provider    10/22/21 2200 10/22/21 2151  oxyCODONE (ROXICODONE) immediate release tablet 10 mg  ONCE      Last MAR action: Given - by Sonia Singer on 10/22/21 at [address] [facility], [id number]              Radiology  No results found. Labs  No results found for this visit on 10/22/21. [facility]  Patient seen and evaluated. Relevant records reviewed. 71-year-old male who presents for evaluation for right-sided leg pain. Patient is difficult historian and is avoiding eye contact and reporting that he needs pain medication. He has indirectly asked for opioid pain medications in multiple different ways and I do suspect the reason for his presentation is to get a refill of his prescription narcotic pain meds.   Per chart review I see that he has a history of drug-seeking behavior and was recently prescribed narcotic pain medication and is reportedly out already. I negotiated with the patient and have given him a one-time dose of his usual pain medication since he is complaining of arthralgias and agreeable to get x-rays. Will not prescribe him his prior prescription. Discussed following up with PCP and return precautions otherwise. Patient agreeable to plan expressed understanding of plan. Clinical Impression:  1. Contusion of right lower extremity, initial encounter          Disposition:  Discharge to home in good condition. Blood pressure 118/69, pulse 97, temperature 98.5 °F (36.9 °C), temperature source Oral, resp. rate 18, height 5' 8\" (1.727 m), weight 180 lb (81.6 kg), SpO2 98 %. Patient was given scripts for the following medications. I counseled patient how to take these medications. Discharge Medication List as of 10/22/2021 10:58 PM          Disposition referral (if applicable):  Nelda Loomis MD  [place]     In 1 week  As needed        Total critical care time is 0 minutes, which excludes separately billable procedures and updating family. Time spent is specifically for management of the presenting complaint and symptoms initially, direct bedside care, reevaluation, review of records, and consultation. There was a high probability of clinically significant life-threatening deterioration in the patient's condition, which required my urgent intervention. This chart was generated in part by using Dragon Dictation system and may contain errors related to that system including errors in grammar, punctuation, and spelling, as well as words and phrases that may be inappropriate. If there are any questions or concerns please feel free to contact the dictating provider for clarification.      Brett Dimas MD   Acute Care [facility]       Krunal HERNADEZ [address] [address] [place], [place]  10/26/21 8531

## 2021-10-26 ASSESSMENT — ENCOUNTER SYMPTOMS
ABDOMINAL PAIN: 0
WHEEZING: 0
COUGH: 0
DIARRHEA: 0
PHOTOPHOBIA: 0
NAUSEA: 0
BACK PAIN: 0
SHORTNESS OF BREATH: 0
VOMITING: 0
RHINORRHEA: 0

## 2021-12-27 ENCOUNTER — HOSPITAL ENCOUNTER (INPATIENT)
Age: 21
LOS: 4 days | Discharge: HOME | DRG: 812 | End: 2021-12-31
Attending: EMERGENCY MEDICINE | Admitting: INTERNAL MEDICINE
Payer: COMMERCIAL

## 2021-12-27 DIAGNOSIS — D57.00 SICKLE CELL ANEMIA WITH PAIN (HCC): Primary | ICD-10-CM

## 2021-12-27 DIAGNOSIS — R50.9 FEVER, UNSPECIFIED FEVER CAUSE: ICD-10-CM

## 2021-12-27 LAB
A/G RATIO: 1.4 (ref 1.1–2.2)
ALBUMIN SERPL-MCNC: 4.6 G/DL (ref 3.4–5)
ALP BLD-CCNC: 112 U/L (ref 40–129)
ALT SERPL-CCNC: 15 U/L (ref 10–40)
ANION GAP SERPL CALCULATED.3IONS-SCNC: 13 MMOL/L (ref 3–16)
AST SERPL-CCNC: 26 U/L (ref 15–37)
BILIRUB SERPL-MCNC: 5.3 MG/DL (ref 0–1)
BUN BLDV-MCNC: 6 MG/DL (ref 7–20)
CALCIUM SERPL-MCNC: 9.5 MG/DL (ref 8.3–10.6)
CHLORIDE BLD-SCNC: 102 MMOL/L (ref 99–110)
CO2: 22 MMOL/L (ref 21–32)
CREAT SERPL-MCNC: 0.6 MG/DL (ref 0.9–1.3)
GFR AFRICAN AMERICAN: >60
GFR NON-AFRICAN AMERICAN: >60
GLUCOSE BLD-MCNC: 88 MG/DL (ref 70–99)
IMMATURE RETIC FRACT: 0.66 (ref 0.21–0.37)
POTASSIUM REFLEX MAGNESIUM: 3.6 MMOL/L (ref 3.5–5.1)
RETICULOCYTE ABSOLUTE COUNT: 0.34 M/UL
RETICULOCYTE COUNT PCT: 13.63 % (ref 0.5–2.18)
SODIUM BLD-SCNC: 137 MMOL/L (ref 136–145)
TOTAL PROTEIN: 7.8 G/DL (ref 6.4–8.2)

## 2021-12-27 PROCEDURE — 99284 EMERGENCY DEPT VISIT MOD MDM: CPT

## 2021-12-27 PROCEDURE — 85025 COMPLETE CBC W/AUTO DIFF WBC: CPT

## 2021-12-27 PROCEDURE — 80053 COMPREHEN METABOLIC PANEL: CPT

## 2021-12-27 PROCEDURE — 96376 TX/PRO/DX INJ SAME DRUG ADON: CPT

## 2021-12-27 PROCEDURE — 96375 TX/PRO/DX INJ NEW DRUG ADDON: CPT

## 2021-12-27 PROCEDURE — 6360000002 HC RX W HCPCS: Performed by: EMERGENCY MEDICINE

## 2021-12-27 PROCEDURE — 2580000003 HC RX 258: Performed by: EMERGENCY MEDICINE

## 2021-12-27 PROCEDURE — 96374 THER/PROPH/DIAG INJ IV PUSH: CPT

## 2021-12-27 PROCEDURE — 6370000000 HC RX 637 (ALT 250 FOR IP): Performed by: EMERGENCY MEDICINE

## 2021-12-27 PROCEDURE — 85045 AUTOMATED RETICULOCYTE COUNT: CPT

## 2021-12-27 PROCEDURE — 1200000000 HC SEMI PRIVATE

## 2021-12-27 RX ORDER — KETOROLAC TROMETHAMINE 30 MG/ML
30 INJECTION, SOLUTION INTRAMUSCULAR; INTRAVENOUS ONCE
Status: COMPLETED | OUTPATIENT
Start: 2021-12-27 | End: 2021-12-27

## 2021-12-27 RX ORDER — HYDROMORPHONE HCL 110MG/55ML
2 PATIENT CONTROLLED ANALGESIA SYRINGE INTRAVENOUS
Status: COMPLETED | OUTPATIENT
Start: 2021-12-27 | End: 2021-12-27

## 2021-12-27 RX ORDER — SODIUM CHLORIDE, SODIUM LACTATE, POTASSIUM CHLORIDE, AND CALCIUM CHLORIDE .6; .31; .03; .02 G/100ML; G/100ML; G/100ML; G/100ML
1000 INJECTION, SOLUTION INTRAVENOUS ONCE
Status: COMPLETED | OUTPATIENT
Start: 2021-12-27 | End: 2021-12-27

## 2021-12-27 RX ORDER — ACETAMINOPHEN 500 MG
1000 TABLET ORAL ONCE
Status: COMPLETED | OUTPATIENT
Start: 2021-12-27 | End: 2021-12-27

## 2021-12-27 RX ORDER — DIPHENHYDRAMINE HYDROCHLORIDE 50 MG/ML
25 INJECTION INTRAMUSCULAR; INTRAVENOUS ONCE
Status: COMPLETED | OUTPATIENT
Start: 2021-12-27 | End: 2021-12-27

## 2021-12-27 RX ADMIN — SODIUM CHLORIDE, POTASSIUM CHLORIDE, SODIUM LACTATE AND CALCIUM CHLORIDE 1000 ML: 600; 310; 30; 20 INJECTION, SOLUTION INTRAVENOUS at 20:04

## 2021-12-27 RX ADMIN — KETOROLAC TROMETHAMINE 30 MG: 30 INJECTION, SOLUTION INTRAMUSCULAR at 20:05

## 2021-12-27 RX ADMIN — HYDROMORPHONE HYDROCHLORIDE 2 MG: 2 INJECTION, SOLUTION INTRAMUSCULAR; INTRAVENOUS; SUBCUTANEOUS at 20:05

## 2021-12-27 RX ADMIN — DIPHENHYDRAMINE HYDROCHLORIDE 25 MG: 50 INJECTION, SOLUTION INTRAMUSCULAR; INTRAVENOUS at 20:05

## 2021-12-27 RX ADMIN — ACETAMINOPHEN 1000 MG: 500 TABLET ORAL at 20:04

## 2021-12-27 RX ADMIN — HYDROMORPHONE HYDROCHLORIDE 2 MG: 2 INJECTION, SOLUTION INTRAMUSCULAR; INTRAVENOUS; SUBCUTANEOUS at 21:50

## 2021-12-27 ASSESSMENT — PAIN DESCRIPTION - PAIN TYPE
TYPE: ACUTE PAIN
TYPE: ACUTE PAIN

## 2021-12-27 ASSESSMENT — PAIN SCALES - GENERAL
PAINLEVEL_OUTOF10: 9
PAINLEVEL_OUTOF10: 8

## 2021-12-27 ASSESSMENT — PAIN DESCRIPTION - FREQUENCY: FREQUENCY: CONTINUOUS

## 2021-12-27 ASSESSMENT — PAIN DESCRIPTION - ORIENTATION: ORIENTATION: LOWER;MID

## 2021-12-27 ASSESSMENT — PAIN DESCRIPTION - LOCATION
LOCATION: BACK
LOCATION: BACK

## 2021-12-27 NOTE — LETTER
[address] [place] [address] Drive [address]  Phone: [phone]             December 31, 2021    Patient: [facility]   YOB: 2000   Date of Visit: 12/27/2021       To Whom It May Concern:    [facility] was seen and treated in our facility  beginning 12/27/2021 until 12/30/2021. He may return to work on 1/3/2021.       Sincerely,       Radha Gastelum RN         Signature:__________________________________

## 2021-12-27 NOTE — ED NOTES
Bed: 25  Expected date:   Expected time:   Means of arrival:   Comments:  EKG/triage     Piyush Bautista RN  12/27/21 0005

## 2021-12-28 LAB
AMPHETAMINE SCREEN, URINE: ABNORMAL
ANION GAP SERPL CALCULATED.3IONS-SCNC: 11 MMOL/L (ref 3–16)
ANISOCYTOSIS: ABNORMAL
ATYPICAL LYMPHOCYTE RELATIVE PERCENT: 1 % (ref 0–6)
BANDED NEUTROPHILS RELATIVE PERCENT: 2 % (ref 0–7)
BARBITURATE SCREEN URINE: ABNORMAL
BASOPHILS ABSOLUTE: 0.1 K/UL (ref 0–0.2)
BASOPHILS ABSOLUTE: 0.2 K/UL (ref 0–0.2)
BASOPHILS RELATIVE PERCENT: 0.8 %
BASOPHILS RELATIVE PERCENT: 1 %
BENZODIAZEPINE SCREEN, URINE: ABNORMAL
BILIRUBIN URINE: ABNORMAL
BLOOD, URINE: NEGATIVE
BUN BLDV-MCNC: 6 MG/DL (ref 7–20)
CALCIUM SERPL-MCNC: 9.4 MG/DL (ref 8.3–10.6)
CANNABINOID SCREEN URINE: ABNORMAL
CHLORIDE BLD-SCNC: 105 MMOL/L (ref 99–110)
CLARITY: CLEAR
CO2: 26 MMOL/L (ref 21–32)
COCAINE METABOLITE SCREEN URINE: ABNORMAL
COLOR: ABNORMAL
COMMENT UA: NORMAL
CREAT SERPL-MCNC: 0.7 MG/DL (ref 0.9–1.3)
EOSINOPHILS ABSOLUTE: 0 K/UL (ref 0–0.6)
EOSINOPHILS ABSOLUTE: 0.3 K/UL (ref 0–0.6)
EOSINOPHILS RELATIVE PERCENT: 0 %
EOSINOPHILS RELATIVE PERCENT: 2.3 %
GFR AFRICAN AMERICAN: >60
GFR NON-AFRICAN AMERICAN: >60
GLUCOSE BLD-MCNC: 88 MG/DL (ref 70–99)
GLUCOSE URINE: NEGATIVE MG/DL
HCT VFR BLD CALC: 24.5 % (ref 40.5–52.5)
HCT VFR BLD CALC: 24.5 % (ref 40.5–52.5)
HEMATOLOGY PATH CONSULT: NO
HEMOGLOBIN: 8.4 G/DL (ref 13.5–17.5)
HEMOGLOBIN: 8.6 G/DL (ref 13.5–17.5)
IMMATURE RETIC FRACT: 0.66 (ref 0.21–0.37)
KETONES, URINE: NEGATIVE MG/DL
LEUKOCYTE ESTERASE, URINE: NEGATIVE
LYMPHOCYTES ABSOLUTE: 1.1 K/UL (ref 1–5.1)
LYMPHOCYTES ABSOLUTE: 2 K/UL (ref 1–5.1)
LYMPHOCYTES RELATIVE PERCENT: 10 %
LYMPHOCYTES RELATIVE PERCENT: 9.4 %
Lab: ABNORMAL
MACROCYTES: ABNORMAL
MCH RBC QN AUTO: 33.8 PG (ref 26–34)
MCH RBC QN AUTO: 33.9 PG (ref 26–34)
MCHC RBC AUTO-ENTMCNC: 34.3 G/DL (ref 31–36)
MCHC RBC AUTO-ENTMCNC: 35.1 G/DL (ref 31–36)
MCV RBC AUTO: 96.7 FL (ref 80–100)
MCV RBC AUTO: 98.6 FL (ref 80–100)
METHADONE SCREEN, URINE: ABNORMAL
MICROCYTES: ABNORMAL
MICROSCOPIC EXAMINATION: YES
MONOCYTES ABSOLUTE: 1.4 K/UL (ref 0–1.3)
MONOCYTES ABSOLUTE: 1.4 K/UL (ref 0–1.3)
MONOCYTES RELATIVE PERCENT: 12.1 %
MONOCYTES RELATIVE PERCENT: 8 %
NEUTROPHILS ABSOLUTE: 14.2 K/UL (ref 1.7–7.7)
NEUTROPHILS ABSOLUTE: 8.5 K/UL (ref 1.7–7.7)
NEUTROPHILS RELATIVE PERCENT: 75.4 %
NEUTROPHILS RELATIVE PERCENT: 78 %
NITRITE, URINE: NEGATIVE
OPIATE SCREEN URINE: POSITIVE
OXYCODONE URINE: POSITIVE
PDW BLD-RTO: 22 % (ref 12.4–15.4)
PDW BLD-RTO: 23.6 % (ref 12.4–15.4)
PH UA: 6.5
PH UA: 6.5 (ref 5–8)
PHENCYCLIDINE SCREEN URINE: ABNORMAL
PLATELET # BLD: 381 K/UL (ref 135–450)
PLATELET # BLD: 417 K/UL (ref 135–450)
PLATELET SLIDE REVIEW: ADEQUATE
PMV BLD AUTO: 7.7 FL (ref 5–10.5)
PMV BLD AUTO: 8.3 FL (ref 5–10.5)
POIKILOCYTES: ABNORMAL
POLYCHROMASIA: ABNORMAL
POTASSIUM SERPL-SCNC: 4.1 MMOL/L (ref 3.5–5.1)
PROPOXYPHENE SCREEN: ABNORMAL
PROTEIN UA: ABNORMAL MG/DL
RBC # BLD: 2.48 M/UL (ref 4.2–5.9)
RBC # BLD: 2.53 M/UL (ref 4.2–5.9)
RBC UA: NORMAL /HPF (ref 0–4)
RETICULOCYTE ABSOLUTE COUNT: 0.3 M/UL
RETICULOCYTE COUNT PCT: 11.93 % (ref 0.5–2.18)
SCHISTOCYTES: ABNORMAL
SICKLE CELLS: ABNORMAL
SLIDE REVIEW: ABNORMAL
SODIUM BLD-SCNC: 142 MMOL/L (ref 136–145)
SPECIFIC GRAVITY UA: 1.01 (ref 1–1.03)
TARGET CELLS: ABNORMAL
URINE REFLEX TO CULTURE: ABNORMAL
URINE TYPE: ABNORMAL
UROBILINOGEN, URINE: 1 E.U./DL
WBC # BLD: 11.3 K/UL (ref 4–11)
WBC # BLD: 17.8 K/UL (ref 4–11)
WBC UA: NORMAL /HPF (ref 0–5)

## 2021-12-28 PROCEDURE — 81001 URINALYSIS AUTO W/SCOPE: CPT

## 2021-12-28 PROCEDURE — 94761 N-INVAS EAR/PLS OXIMETRY MLT: CPT

## 2021-12-28 PROCEDURE — 6370000000 HC RX 637 (ALT 250 FOR IP): Performed by: INTERNAL MEDICINE

## 2021-12-28 PROCEDURE — 6370000000 HC RX 637 (ALT 250 FOR IP): Performed by: NURSE PRACTITIONER

## 2021-12-28 PROCEDURE — 6360000002 HC RX W HCPCS: Performed by: NURSE PRACTITIONER

## 2021-12-28 PROCEDURE — 2700000000 HC OXYGEN THERAPY PER DAY

## 2021-12-28 PROCEDURE — 2580000003 HC RX 258: Performed by: INTERNAL MEDICINE

## 2021-12-28 PROCEDURE — 36415 COLL VENOUS BLD VENIPUNCTURE: CPT

## 2021-12-28 PROCEDURE — 85045 AUTOMATED RETICULOCYTE COUNT: CPT

## 2021-12-28 PROCEDURE — 6360000002 HC RX W HCPCS: Performed by: INTERNAL MEDICINE

## 2021-12-28 PROCEDURE — 80307 DRUG TEST PRSMV CHEM ANLYZR: CPT

## 2021-12-28 PROCEDURE — 1200000000 HC SEMI PRIVATE

## 2021-12-28 PROCEDURE — 85025 COMPLETE CBC W/AUTO DIFF WBC: CPT

## 2021-12-28 PROCEDURE — 80048 BASIC METABOLIC PNL TOTAL CA: CPT

## 2021-12-28 RX ORDER — MORPHINE SULFATE 15 MG/1
15 TABLET, FILM COATED, EXTENDED RELEASE ORAL EVERY 12 HOURS SCHEDULED
Status: DISCONTINUED | OUTPATIENT
Start: 2021-12-28 | End: 2021-12-31 | Stop reason: HOSPADM

## 2021-12-28 RX ORDER — HYDROMORPHONE HCL 110MG/55ML
2 PATIENT CONTROLLED ANALGESIA SYRINGE INTRAVENOUS
Status: DISCONTINUED | OUTPATIENT
Start: 2021-12-28 | End: 2021-12-31 | Stop reason: HOSPADM

## 2021-12-28 RX ORDER — ALBUTEROL SULFATE 90 UG/1
2 AEROSOL, METERED RESPIRATORY (INHALATION) EVERY 4 HOURS PRN
Status: DISCONTINUED | OUTPATIENT
Start: 2021-12-28 | End: 2021-12-31 | Stop reason: HOSPADM

## 2021-12-28 RX ORDER — HYDROMORPHONE HCL 110MG/55ML
2 PATIENT CONTROLLED ANALGESIA SYRINGE INTRAVENOUS EVERY 4 HOURS PRN
Status: DISCONTINUED | OUTPATIENT
Start: 2021-12-28 | End: 2021-12-28

## 2021-12-28 RX ORDER — DOCUSATE SODIUM 100 MG/1
100 CAPSULE, LIQUID FILLED ORAL DAILY
Status: DISCONTINUED | OUTPATIENT
Start: 2021-12-28 | End: 2021-12-31 | Stop reason: HOSPADM

## 2021-12-28 RX ORDER — DEXTROSE AND SODIUM CHLORIDE 5; .45 G/100ML; G/100ML
INJECTION, SOLUTION INTRAVENOUS CONTINUOUS
Status: DISCONTINUED | OUTPATIENT
Start: 2021-12-28 | End: 2021-12-31 | Stop reason: HOSPADM

## 2021-12-28 RX ORDER — PANTOPRAZOLE SODIUM 40 MG/1
40 TABLET, DELAYED RELEASE ORAL
Status: DISCONTINUED | OUTPATIENT
Start: 2021-12-29 | End: 2021-12-31 | Stop reason: HOSPADM

## 2021-12-28 RX ORDER — FOLIC ACID 1 MG/1
2 TABLET ORAL DAILY
Status: DISCONTINUED | OUTPATIENT
Start: 2021-12-28 | End: 2021-12-31 | Stop reason: HOSPADM

## 2021-12-28 RX ORDER — HYDROXYUREA 500 MG/1
1000 CAPSULE ORAL DAILY
Status: DISCONTINUED | OUTPATIENT
Start: 2021-12-28 | End: 2021-12-31 | Stop reason: HOSPADM

## 2021-12-28 RX ORDER — ACETAMINOPHEN 325 MG/1
650 TABLET ORAL EVERY 4 HOURS PRN
Status: DISCONTINUED | OUTPATIENT
Start: 2021-12-28 | End: 2021-12-31 | Stop reason: HOSPADM

## 2021-12-28 RX ORDER — KETOROLAC TROMETHAMINE 30 MG/ML
15 INJECTION, SOLUTION INTRAMUSCULAR; INTRAVENOUS EVERY 6 HOURS PRN
Status: DISCONTINUED | OUTPATIENT
Start: 2021-12-28 | End: 2021-12-31 | Stop reason: HOSPADM

## 2021-12-28 RX ADMIN — DEXTROSE AND SODIUM CHLORIDE: 5; 450 INJECTION, SOLUTION INTRAVENOUS at 01:18

## 2021-12-28 RX ADMIN — HYDROXYUREA 1000 MG: 500 CAPSULE ORAL at 12:36

## 2021-12-28 RX ADMIN — MORPHINE SULFATE 15 MG: 15 TABLET, FILM COATED, EXTENDED RELEASE ORAL at 21:17

## 2021-12-28 RX ADMIN — HYDROMORPHONE HYDROCHLORIDE 2 MG: 2 INJECTION, SOLUTION INTRAMUSCULAR; INTRAVENOUS; SUBCUTANEOUS at 23:09

## 2021-12-28 RX ADMIN — HYDROMORPHONE HYDROCHLORIDE 2 MG: 2 INJECTION, SOLUTION INTRAMUSCULAR; INTRAVENOUS; SUBCUTANEOUS at 16:47

## 2021-12-28 RX ADMIN — KETOROLAC TROMETHAMINE 15 MG: 30 INJECTION, SOLUTION INTRAMUSCULAR; INTRAVENOUS at 23:49

## 2021-12-28 RX ADMIN — ACETAMINOPHEN 650 MG: 325 TABLET ORAL at 23:49

## 2021-12-28 RX ADMIN — APIXABAN 5 MG: 5 TABLET, FILM COATED ORAL at 08:51

## 2021-12-28 RX ADMIN — FOLIC ACID 2 MG: 1 TABLET ORAL at 08:51

## 2021-12-28 RX ADMIN — HYDROMORPHONE HYDROCHLORIDE 2 MG: 2 INJECTION, SOLUTION INTRAMUSCULAR; INTRAVENOUS; SUBCUTANEOUS at 01:18

## 2021-12-28 RX ADMIN — APIXABAN 5 MG: 5 TABLET, FILM COATED ORAL at 21:17

## 2021-12-28 RX ADMIN — DOCUSATE SODIUM 100 MG: 100 CAPSULE, LIQUID FILLED ORAL at 08:51

## 2021-12-28 RX ADMIN — HYDROMORPHONE HYDROCHLORIDE 2 MG: 2 INJECTION, SOLUTION INTRAMUSCULAR; INTRAVENOUS; SUBCUTANEOUS at 05:46

## 2021-12-28 RX ADMIN — HYDROMORPHONE HYDROCHLORIDE 2 MG: 2 INJECTION, SOLUTION INTRAMUSCULAR; INTRAVENOUS; SUBCUTANEOUS at 09:55

## 2021-12-28 RX ADMIN — HYDROMORPHONE HYDROCHLORIDE 2 MG: 2 INJECTION, SOLUTION INTRAMUSCULAR; INTRAVENOUS; SUBCUTANEOUS at 20:03

## 2021-12-28 RX ADMIN — HYDROMORPHONE HYDROCHLORIDE 2 MG: 2 INJECTION, SOLUTION INTRAMUSCULAR; INTRAVENOUS; SUBCUTANEOUS at 13:48

## 2021-12-28 ASSESSMENT — PAIN SCALES - GENERAL
PAINLEVEL_OUTOF10: 9
PAINLEVEL_OUTOF10: 4
PAINLEVEL_OUTOF10: 9
PAINLEVEL_OUTOF10: 8
PAINLEVEL_OUTOF10: 4
PAINLEVEL_OUTOF10: 9
PAINLEVEL_OUTOF10: 7
PAINLEVEL_OUTOF10: 7
PAINLEVEL_OUTOF10: 9

## 2021-12-28 ASSESSMENT — PAIN DESCRIPTION - LOCATION
LOCATION: BACK

## 2021-12-28 ASSESSMENT — PAIN DESCRIPTION - PAIN TYPE: TYPE: ACUTE PAIN

## 2021-12-28 ASSESSMENT — PAIN DESCRIPTION - ORIENTATION: ORIENTATION: MID

## 2021-12-28 ASSESSMENT — PAIN DESCRIPTION - FREQUENCY: FREQUENCY: CONTINUOUS

## 2021-12-28 NOTE — ED PROVIDER NOTES
[facility]  ED    This patient was initially evaluated by Dr. Dereck Ortega. Briefly, 24 y.o. male presented with sickle cell pain crisis. At the time of signout, the following was pending:    - f/u labs   - reassess pain   - dispo    On my reassessment, the patient is resting comfortably in bed in NAD. He complains of persistent pain and was treated with an additional 2 mg hydromorphone IV. He continues to deny fever, chest pain, shortness of breath. He has no indwelling ports. Presentation not consistent with acute chest syndrome or other systemic infection. Pain has been refractory to multiple doses of IV Dilaudid. Admit for pain control. CLINICAL IMPRESSION  1. Sickle cell anemia with pain (HCC)        Blood pressure 133/63, pulse 99, temperature 99.6 °F (37.6 °C), temperature source Oral, resp. rate 20, height 5' 8\" (1.727 m), weight 190 lb (86.2 kg), SpO2 94 %. DISPOSITION    The patient was admitted to the hospital in stable condition. All questions were answered and the patient/family expressed understanding and agreement with the plan. Boni Headley MD    Note: This chart was created using voice recognition dictation software. Efforts were made by me to ensure accuracy, however some errors may be present due to limitations of this technology and occasionally words are not transcribed correctly.         Boni Headley MD  12/27/21 3580

## 2021-12-28 NOTE — ACP (ADVANCE CARE PLANNING)
Advance Care Planning     General Advance Care Planning (ACP) Conversation    Date of Conversation: 12/27/2021  Conducted with: Patient with Decision Making Capacity    Healthcare Decision Maker:    Primary Decision Maker: Linda Green - Parent - [phone]    Secondary Decision Maker: Jenae Kee - Parent - [phone]  Click here to complete Healthcare Decision Makers including selection of the Healthcare Decision Maker Relationship (ie \"Primary\"). Today we documented Decision Maker(s) consistent with Legal Next of Kin hierarchy. Content/Action Overview:  Has NO ACP documents/care preferences - information provided, considering goals and options.  Patient declines to complete HCPOA or Living Will documentation at this time    Reviewed DNR/DNI and patient elects Full Code (Attempt Resuscitation)    Length of Voluntary ACP Conversation in minutes:  [phone] RN

## 2021-12-28 NOTE — CARE COORDINATION
Chart reviewed by CM. Pt IPTA and will not likely have needs at discharge. Please consult CM team if needs arise.

## 2021-12-28 NOTE — H&P
Hospital Medicine History & Physical      PCP: Jill Long MD    Date of Admission: 12/27/2021    Date of Service: Pt seen/examined on 12/27/2021 and Admitted to Inpatient with expected LOS greater than two midnights due to medical therapy. Chief Complaint: Back pain      History Of Present Illness:   24 y.o. male who presented to [facility] with above complaints  Patient with PMH of sickle cell disease presented to the ED today with complaints of low back pain radiating down into both his legs. He reports he has had this pain for the past few days has been progressively getting worse not responsive to oral oxycodone that he has at home which he has been taking. He reports the pain is similar to his prior sickle cell pain crisis. He was last admitted to [facility] back in October 2021 with pain crisis. He denied any cough, shortness of breath, fevers or chills. Denies any chest pain. Past Medical History:          Diagnosis Date    Accidental overdose     Asthma     D[facility]deep [facility]) ([facility] 10/19/2021    R leg    Enlarged heart     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 ([facility])     Sickle cell anemia ([facility])        Past Surgical History:          Procedure Laterality Date    SPLENECTOMY         Medications Prior to Admission:      Prior to Admission medications    Medication Sig Start Date End Date Taking? Authorizing Provider   folic acid (FOLVITE) 1 MG tablet Take 2 tablets by mouth daily 9/16/21   CARMELITA De Los Santos - CNP   oxyCODONE HCl (OXY-IR) 10 MG immediate release tablet Take 10 mg by mouth every 4 hours as needed for Pain.     Historical Provider, MD   albuterol sulfate HFA (PROAIR HFA) 108 (90 Base) MCG/ACT inhaler Albuterol HFA Inhaler 90 mcg/actuation  inhaled  2 puffs prn     Active    Historical Provider, MD   levalbuterol (XOPENEX) 0.31 MG/3ML nebulization Levalbuterol Nebulized    2 puffs prn     Active    Historical Provider, MD   docusate (COLACE, DULCOLAX) 100 MG CAPS Take 100 mg by mouth    Historical Provider, MD   ibuprofen (ADVIL;MOTRIN) 800 MG tablet ibuprofen 800 MG Oral Tablet  oral   prn    Active    Historical Provider, MD   hydroxyurea (HYDREA) 500 MG chemo capsule Take 1,000 mg by mouth daily  12/9/20   Isadora Crawford MD       Allergies:  Morphine    Social History:      The patient currently lives at home    TOBACCO:   reports that he has never smoked. He has never used smokeless tobacco.  ETOH:   reports previous alcohol use. E-Cigarettes/Vaping Use     Questions Responses    E-Cigarette/Vaping Use Never User    Start Date     Passive Exposure     Quit Date     Counseling Given     Comments             Family History:  Positive as follows:        Problem Relation Age of Onset    Other Father         sarcoidosis       REVIEW OF SYSTEMS COMPLETED:   Pertinent positives as noted in the HPI. All other systems reviewed and negative. PHYSICAL EXAM PERFORMED:    BP (!) 108/56   Pulse 66   Temp 99.6 °F (37.6 °C) (Oral)   Resp 13   Ht 5' 8\" (1.727 m)   Wt 190 lb (86.2 kg)   SpO2 93%   BMI 28.89 kg/m²     General appearance:  No apparent distress, appears stated age and cooperative. HEENT:  Normal cephalic, atraumatic without obvious deformity. Pupils equal, round, and reactive to light. Extra ocular muscles intact. Conjunctivae/corneas clear. Neck: Supple, with full range of motion. No jugular venous distention. Trachea midline. Respiratory:  Normal respiratory effort. Clear to auscultation, bilaterally without Rales/Wheezes/Rhonchi. Cardiovascular:  Regular rate and rhythm with normal S1/S2 without murmurs, rubs or gallops. Abdomen: Soft, non-tender, non-distended with normal bowel sounds. Musculoskeletal:  No clubbing, cyanosis or edema bilaterally. Full range of motion without deformity. Skin: Skin color, texture, turgor normal.  No rashes or lesions.   Neurologic:  Neurovascularly intact without any focal sensory/motor deficits. Cranial nerves: II-XII intact, grossly non-focal.  Psychiatric:  Alert and oriented, thought content appropriate, normal insight  Capillary Refill: Brisk,3 seconds, normal  Peripheral Pulses: +2 palpable, equal bilaterally       Labs:     Recent Labs     12/27/21 1949   WBC 17.8*   HGB 8.6*   HCT 24.5*        Recent Labs     12/27/21 1949      K 3.6      CO2 22   BUN 6*   CREATININE 0.6*   CALCIUM 9.5     Recent Labs     12/27/21 1949   AST 26   ALT 15   BILITOT 5.3*   ALKPHOS 112     No results for input(s): INR in the last 72 hours. No results for input(s): Garcias Akhil in the last 72 hours. Urinalysis:      Lab Results   Component Value Date    NITRU Negative 06/09/2021    WBCUA None seen 06/09/2021    BACTERIA Rare 06/09/2021    RBCUA 0-2 06/09/2021    BLOODU TRACE-INTACT 06/09/2021    SPECGRAV 1.010 06/09/2021    GLUCOSEU Negative 06/09/2021     Retic Ct Pct  Reticulocytes  Collected: 12/27/21 1949   Result status: Final   Resulting lab: [facility]   Reference range: 0.50 - 2.18 %   Value: 13.63 High     *Additional information available - narrative       ASSESSMENT:PLAN:    Sickle cell pain crisis   Characteristic pain crisis. Reticulocyte count elevated 13.63%. No evidence of acute chest syndrome.  -Start IV fluids with D5 half-normal saline  -Pain control with MS Contin twice daily scheduled, IV Dilaudid 2 mg every 4 hours as needed for breakthrough pain  -Oncology consult  -Monitor hemoglobin, currently 8.6 no need for transfusion    Asthma-stable, resume inhalers    Leukocytosis-likely reactive to pain crisis. No evidence of infection. Monitor CBC    Hx of DVT -on Eliquis, resumed      DVT Prophylaxis: Eliquis  Diet: ADULT DIET; Regular  Code Status: Full Code    PT/OT Eval Status: Ambulatory    Dispo -IP stay       Rolanda Huff MD    Thank you Raheem Jurado MD for the opportunity to be involved in this patient's care.

## 2021-12-28 NOTE — PROGRESS NOTES
4 Eyes Skin Assessment     NAME:  Coral Clayton  YOB: 2000  MEDICAL RECORD NUMBER:  [id number]    The patient is being assess for  Admission    I agree that 2 RN's have performed a thorough Head to Toe Skin Assessment on the patient. ALL assessment sites listed below have been assessed. Areas assessed by both nurses:    Head, Face, Ears, Shoulders, Back, Chest, Arms, Elbows, Hands, Sacrum. Buttock, Coccyx, Ischium and Legs. Feet and Heels        Does the Patient have a Wound?  No noted wound(s)       Shaun Prevention initiated:  No   Wound Care Orders initiated:  No    Pressure Injury (Stage 3,4, Unstageable, DTI, NWPT, and Complex wounds) if present place consult order under [de-identified] No    New and Established Ostomies if present place consult order under : No      Nurse 1 eSignature: Electronically signed by Frankey Euler, RN on 12/28/21 at 3:33 PM EST    **SHARE this note so that the co-signing nurse is able to place an eSignature**    Nurse 2 eSignature: {Esignature:[id number]}

## 2021-12-28 NOTE — CONSULTS
Hematology/Oncology Consultation         Patient:   [facility]       Reason for Consult:  Sickle cell crisis   Requesting Physician: No ref. provider found    Chief Complaint:     Chief Complaint   Patient presents with    Back Pain     pt reports \"sickle cell pain\" in back. no relief with home dose of oxycodone                  History Obtained from:     patient, electronic medical record    History of Present Illness:     [facility] is a 24 y.o. male  with significant past medical history of sickle cell who presents with back pain with complaints of crisis like pain. Hgb 8.6. He has not been admitted since Oct of this year. He reports improved back pain. Denies fevers, cough, HA, or chest pain. No family in room. Patient is sleepy but easily awakens.      Past Medical History:         Diagnosis Date    Accidental overdose     Asthma     DVT (deep venous thrombosis) (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)     Sickle cell anemia (HCC)        Past Surgical History:         Procedure Laterality Date    SPLENECTOMY         Current Medications:     Current Facility-Administered Medications   Medication Dose Route Frequency Provider Last Rate Last Admin    albuterol sulfate  (90 Base) MCG/ACT inhaler 2 puff  2 puff Inhalation Q4H PRN Edis Patel MD        docusate sodium (COLACE) capsule 100 mg  100 mg Oral Daily Edis Patel MD   100 mg at 12/28/21 0851    hydroxyurea (HYDREA) chemo capsule 1,000 mg  1,000 mg Oral Daily Edis Patel MD   1,000 mg at 33/48/37 7661    folic acid (FOLVITE) tablet 2 mg  2 mg Oral Daily Edis Patel MD   2 mg at 12/28/21 0851    dextrose 5 % and 0.45 % sodium chloride infusion   IntraVENous Continuous Edis Patel  mL/hr at 12/28/21 0118 New Bag at 12/28/21 0118    morphine (MS CONTIN) extended release tablet 15 mg  15 mg Oral 2 times per day Lexa Martínez MD        [facility]) tablet 5 mg  5 mg Oral BID Lexa Martínez MD   5 mg at 12/28/21 0851    HYDROmorphone (DILAUDID) injection 2 mg  2 mg IntraVENous Q3H PRN CARMELITA Leos - CNP   2 mg at 12/28/21 2637       Allergies: Allergies   Allergen Reactions    Morphine Shortness Of Breath     Other reaction(s): Histamine-like Reaction  Has asthma exacerbation with morphine-histamine type reaction         Social History:     Social History     Socioeconomic History    Marital status: Single     Spouse name: Not on file    Number of children: 0    Years of education: Not on file    Highest education level: Not on file   Occupational History    Not on file   Tobacco Use    Smoking status: Never Smoker    Smokeless tobacco: Never Used   Vaping Use    Vaping Use: Never used   Substance and Sexual Activity    Alcohol use: Not Currently    Drug use: Never    Sexual activity: Not Currently   Other Topics Concern    Not on file   Social History Narrative    Not on file     Social Determinants of Health     Financial Resource Strain:     Difficulty of Paying Living Expenses: Not on file   Food Insecurity:     Worried About Running Out of Food in the Last Year: Not on file    Laura of Food in the Last Year: Not on file   Transportation Needs:     Lack of Transportation (Medical): Not on file    Lack of Transportation (Non-Medical):  Not on file   Physical Activity:     Days of Exercise per Week: Not on file    Minutes of Exercise per Session: Not on file   Stress:     Feeling of Stress : Not on file   Social Connections:     Frequency of Communication with Friends and Family: Not on file    Frequency of Social Gatherings with Friends and Family: Not on file    Attends Taoist Services: Not on file    Active Member of Clubs or Organizations: Not on file    Attends Club or Organization Meetings: Not on file    Marital Status: Not on file   Intimate Partner Violence:     Fear of Current or Ex-Partner: Not on file    Emotionally Abused: Not on file    Physically Abused: Not on file    Sexually Abused: Not on file   Housing Stability:     Unable to Pay for Housing in the Last Year: Not on file    Number of Places Lived in the Last Year: Not on file    Unstable Housing in the Last Year: Not on file     Social History     Substance and Sexual Activity   Drug Use Never     Social History     Substance and Sexual Activity   Alcohol Use Not Currently     Social History     Substance and Sexual Activity   Sexual Activity Not Currently     Social History     Tobacco Use   Smoking Status Never Smoker   Smokeless Tobacco Never Used       Family History:     Family History   Problem Relation Age of Onset    Other Father         sarcoidosis       Review of Systems:     Constitutional: Denies fever, sweats, weight loss. .     Eyes: No visual changes or diplopia. No scleral icterus. ENT: No Headaches, no hearing loss, no  vertigo. No mouth sores or sore throat. Cardiovascular: No chest pain, no dyspnea on exertion, no palpitations, no  loss of consciousness. Respiratory: No cough, no  wheezing, no dyspnea, no sputum production. No hemoptysis. .    Gastrointestinal: No abdominal pain, no appetite loss, no blood in stools. No change in bowel habits. Genitourinary: No dysuria, trouble voiding, or hematuria. Musculoskeletal:  Generalized weakness. No joint complaints. Integumentary: No rash or pruritis. Neurological: No headache, diplopia. No change in gait, balance, or coordination. No paresthesias. Endocrine: No temperature intolerance. No excessive thirst, fluid intake, or urination. Hematologic/Lymphatic: No abnormal bruising or ecchymoses, no blood clots or swollen lymph nodes. Allergic/Immunologic: No nasal congestion or hives.      Physical Exam:     /69   Pulse 64   Temp 97.8 °F (36.6 °C) (Oral)   Resp 18   Ht 5' 8\" (1.727 m)   Wt 190 lb (86.2 kg) SpO2 98%   BMI 28.89 kg/m²     CONSTITUTIONAL: awake, alert, cooperative, no apparent distress. EYES:  Pupils equal, round and reactive to light, sclera non-icteric, conjunctiva normal  ENT:  Normocephalic, without obvious abnormality, atraumatic, sinuses nontender on palpation, external ears without lesions, oral pharynx with moist mucus membranes, no mucositis. NECK:  Supple, symmetrical, trachea midline, no adenopathy, thyroid symmetric, not enlarged and no tenderness, skin normal  HEMATOLOGIC/LYMPHATICS:  no cervical lymphadenopathy, no supraclavicular lymphadenopathy, no axillary lymphadenopathy and no inguinal lymphadenopathy  BACK:  Symmetric, no curvature, spinous processes are non-tender on palpation, paraspinous muscles are non-tender on palpation, no costal vertebral tenderness  LUNGS:  Clear to auscultation bilaterally, no crackles or wheezing  CARDIOVASCULAR:  Regular rate and rhythm, normal S1 and S2, no S3 or S4, and no murmur noted  ABDOMEN:  Normal bowel sounds, soft, non-distended, non-tender, no masses palpated, no hepatosplenomegally  MUSCULOSKELETAL:  There is no redness, warmth, or swelling of the joints  NEUROLOGIC:   No focal findings. SKIN:  Scattered bruising and ecchymoses. EXT: without clubbing, cyanosis or edema. Data:     CBC:  Recent Labs     12/27/21 1949   WBC 17.8*   HGB 8.6*   HCT 24.5*   MCV 96.7          BMP:  Recent Labs     12/27/21 1949      K 3.6   CO2 22   BUN 6*   CREATININE 0.6*       HEPATIC:  Recent Labs     12/27/21 1949   AST 26   ALT 15   ALKPHOS 112   PROT 7.8   BILITOT 5.3*       TUMOR MARKERS:  No results for input(s): PSA, CEA, , EN1753,  in the last 720 hours.       MAGNESIUM:    Lab Results   Component Value Date    MG 2.00 09/13/2021       PT/INR:    No results found for: [facility]    Lab Results   Component Value Date    INR 1.26 09/28/2021       PTT:    Lab Results   Component Value Date    APTT 43.5 10/19/2021       U/A: Lab Results   Component Value Date    COLORU Yellow 06/09/2021    PHUR 7.0 07/25/2021    WBCUA None seen 06/09/2021    RBCUA 0-2 06/09/2021    MUCUS 2+ 12/01/2020    BACTERIA Rare 06/09/2021    CLARITYU Clear 06/09/2021    SPECGRAV 1.010 06/09/2021    LEUKOCYTESUR Negative 06/09/2021    UROBILINOGEN 0.2 06/09/2021    BILIRUBINUR Negative 06/09/2021    BLOODU TRACE-INTACT 06/09/2021    GLUCOSEU Negative 06/09/2021    AMORPHOUS Rare 01/05/2021       Imaging:     NA     Impression:     Sickle Cell crisis   Back pain     Plan:     Sickle Cell   - Cont Hydrea 1000 daily   - folate tab daily   -  Last Hgb is 8.6- does not need transfused   - has refused red cell exchanges in the past   - cont MS CONTIN 15 BID - USED DURING HOSPITAL STAYS ONLY   - Dilaudid 2 mg every 3 hours PRN   - Cont IV fluids and toradol   - no signs of acute chest or impending stroke with neurological sx   - add Oxy IR 20 mg every 4 hours PRN to help diminished use of IV narcotics- would like to stop IV Dilaudid tomorrow     Dispo- one to two days             Dr. Elpidio Mcknight to see in the AM.     Thank you very much for allowing me to participate in the care of this patient.     Carmela Banda, LYNNETTE   Medical Oncology/Hematology    [facility] - [address],  [place] [address]  Phone: [phone]  Fax: [phone]

## 2021-12-28 NOTE — ED PROVIDER NOTES
Emergency Physician Note        Note Open Time: 9:16 PM EST    Chief Complaint  Back Pain (pt reports \"sickle cell pain\" in back. no relief with home dose of oxycodone)       History of Present Illness  Hiwot Pedraza is a 24 y.o. male who presents to the ED for back pain. Patient reports that he has chronic low back pain related to sickle cell anemia and his pain is getting worse than typical.  He also states he is run out of his medicines. He denies any fevers, chills or sweats. No vomiting or diarrhea. No chest pain or shortness of breath. No URI symptoms. 10 systems reviewed, pertinent positives per HPI otherwise noted to be negative    I have reviewed the following from the nursing documentation:      Prior to Admission medications    Medication Sig Start Date End Date Taking? Authorizing Provider   folic acid (FOLVITE) 1 MG tablet Take 2 tablets by mouth daily 9/16/21   Derk Claude, APRN - CNP   oxyCODONE HCl (OXY-IR) 10 MG immediate release tablet Take 10 mg by mouth every 4 hours as needed for Pain.     Historical Provider, MD   albuterol sulfate HFA (PROAIR HFA) 108 (90 Base) MCG/ACT inhaler Albuterol HFA Inhaler 90 mcg/actuation  inhaled  2 puffs prn     Active    Historical Provider, MD   levalbuterol (XOPENEX) 0.31 MG/3ML nebulization Levalbuterol Nebulized    2 puffs prn     Active    Historical Provider, MD   docusate (COLACE, DULCOLAX) 100 MG CAPS Take 100 mg by mouth    Historical Provider, MD   ibuprofen (ADVIL;MOTRIN) 800 MG tablet ibuprofen 800 MG Oral Tablet  oral   prn    Active    Historical Provider, MD   hydroxyurea (HYDREA) 500 MG chemo capsule Take 1,000 mg by mouth daily  12/9/20   Beto Castellanos MD       Allergies as of 12/27/2021 - Fully Reviewed 12/27/2021   Allergen Reaction Noted    Morphine Shortness Of Breath 04/05/2013       Past Medical History:   Diagnosis Date    Accidental overdose     Asthma     DVT (deep venous thrombosis) (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)     Sickle cell anemia (HCC)         Surgical History:   Past Surgical History:   Procedure Laterality Date    SPLENECTOMY          Family History:    Family History   Problem Relation Age of Onset    Other Father         sarcoidosis       Social History     Socioeconomic History    Marital status: Single     Spouse name: Not on file    Number of children: 0    Years of education: Not on file    Highest education level: Not on file   Occupational History    Not on file   Tobacco Use    Smoking status: Never Smoker    Smokeless tobacco: Never Used   Vaping Use    Vaping Use: Never used   Substance and Sexual Activity    Alcohol use: Not Currently    Drug use: Never    Sexual activity: Not Currently   Other Topics Concern    Not on file   Social History Narrative    Not on file     Social Determinants of Health     Financial Resource Strain:     Difficulty of Paying Living Expenses: Not on file   Food Insecurity:     Worried About Running Out of Food in the Last Year: Not on file    Luara of Food in the Last Year: Not on file   Transportation Needs:     Lack of Transportation (Medical): Not on file    Lack of Transportation (Non-Medical):  Not on file   Physical Activity:     Days of Exercise per Week: Not on file    Minutes of Exercise per Session: Not on file   Stress:     Feeling of Stress : Not on file   Social Connections:     Frequency of Communication with Friends and Family: Not on file    Frequency of Social Gatherings with Friends and Family: Not on file    Attends Lutheran Services: Not on file    Active Member of Clubs or Organizations: Not on file    Attends Club or Organization Meetings: Not on file    Marital Status: Not on file   Intimate Partner Violence:     Fear of Current or Ex-Partner: Not on file    Emotionally Abused: Not on file    Physically Abused: Not on file    Sexually Abused: Not on file Housing Stability:     Unable to Pay for Housing in the Last Year: Not on file    Number of Places Lived in the Last Year: Not on file    Unstable Housing in the Last Year: Not on file       Nursing notes reviewed. ED Triage Vitals [12/27/21 1705]   Enc Vitals Group      /63      Pulse 99      Resp 20      Temp 99.6 °F (37.6 °C)      Temp Source Oral      SpO2 94 %      Weight 190 lb (86.2 kg)      Height 5' 8\" (1.727 m)      Head Circumference       Peak Flow       Pain Score       Pain Loc       Pain Edu? Excl. in [address]? GENERAL:  Awake, alert. Well developed, well nourished with no apparent distress. HENT:  Normocephalic, Atraumatic, moist mucous membranes. EYES:  Pupils equal round and reactive to light, Conjunctiva normal, extraocular movements normal.  NECK:  No meningeal signs, Supple. CHEST:  Regular rate and rhythm, chest wall non-tender. LUNGS:  Clear to auscultation bilaterally. ABDOMEN:  Soft, non-tender, no rebound, rigidity or guarding, non-distended, normal bowel sounds. No costovertebral angle tenderness to palpation. BACK: Muscular lumbar tenderness, no midline tenderness. EXTREMITIES:  Normal range of motion, no edema, no bony tenderness, no deformity, distal pulses present. SKIN: Warm, dry and intact. NEUROLOGIC: Normal mental status. Moving all extremities to command. Strength, sensation and reflexes intact both lower extremities.     LABS  Labs Reviewed   CBC WITH AUTO DIFFERENTIAL - Abnormal; Notable for the following components:       Result Value    WBC 17.8 (*)     RBC 2.53 (*)     Hemoglobin 8.6 (*)     Hematocrit 24.5 (*)     RDW 23.6 (*)     Neutrophils Absolute 14.2 (*)     Monocytes Absolute 1.4 (*)     Anisocytosis 3+ (*)     Macrocytes 2+ (*)     Microcytes Occasional (*)     Polychromasia Occasional (*)     Poikilocytes 2+ (*)     Schistocytes Occasional (*)     Target Cells Occasional (*)     Sickle Cells 3+ (*)     All other components within normal limits    Narrative:     Performed at:  [facility]) - [address], [facility]   Phone [phone]   RETICULOCYTES - Abnormal; Notable for the following components:    Retic Ct Pct 13.63 (*)     Immature Retic Fract 0.66 (*)     All other components within normal limits    Narrative:     Performed at:  [facility]) [address], [facility] [place] [place] [employer]   Phone [phone]   COMPREHENSIVE METABOLIC PANEL W/ REFLEX TO MG FOR LOW K   URINE RT REFLEX TO CULTURE   URINE DRUG SCREEN       MEDICAL DECISION MAKING          I spoke with the on-call oncologist, Dr. Mere Lozano, and he recommended that this patient often runs out of his medicines and that we should treat his pain as needed and determine if there are any other emergencies present and if not the patient can be discharged to follow-up as an outpatient and get his refills. 9:17 PM: I discussed the history, physical, and treatment plan with Dr. Leroy Swann. Yamini Real was signed out in stable condition. Please see Dr. Lexis Uriostegui note for further details, including diagnosis and disposition. Clinical Impression    1. Sickle cell anemia with pain (HCC)        Blood pressure 133/63, pulse 99, temperature 99.6 °F (37.6 °C), temperature source Oral, resp. rate 20, height 5' 8\" (1.727 m), weight 190 lb (86.2 kg), SpO2 94 %. This chart was generated using the [address] [place] [address] [address] dictation system. I created this record but it may contain dictation errors.           Basia Sullivan MD  12/27/21 9155

## 2021-12-28 NOTE — CONSULTS
Consult placed    Who:Dr. Pena Corporal  Date:12/28/2021,  Time:9:04 AM        Electronically signed by Ruy Flynn on 12/28/2021 at 9:04 AM

## 2021-12-28 NOTE — ED NOTES
Attempted to call report to [employer] from [facility]. Nurse unable to take report at this time. Nurse to call back or come to the ER for report.        Tiara Benson RN  12/28/21 8942

## 2021-12-28 NOTE — PROGRESS NOTES
Hospitalist Progress Note      PCP: Hilario Ross MD    Date of Admission: 12/27/2021    Chief Complaint: back pain    Hospital Course: 24 y.o. male who presented to [address] [place] [address] with above complaints  Patient with PMH of sickle cell disease presented to the ED today with complaints of low back pain radiating down into both his legs. He reports he has had this pain for the past few days has been progressively getting worse not responsive to oral oxycodone that he has at home which he has been taking. He reports the pain is similar to his prior sickle cell pain crisis. He was last admitted to [address] [place] [address] back in October 2021 with pain crisis. He denied any cough, shortness of breath, fevers or chills. Denies any chest pain.        Subjective: c/o back pain      Medications:  Reviewed    Infusion Medications    dextrose 5 % and 0.45 % NaCl 125 mL/hr at 12/28/21 0118     Scheduled Medications    docusate sodium  100 mg Oral Daily    hydroxyurea  1,000 mg Oral Daily    folic acid  2 mg Oral Daily    morphine  15 mg Oral 2 times per day    apixaban  5 mg Oral BID     PRN Meds: albuterol sulfate HFA, HYDROmorphone, oxyCODONE      Intake/Output Summary (Last 24 hours) at 12/28/2021 1704  Last data filed at 12/28/2021 1443  Gross per 24 hour   Intake 360 ml   Output 1150 ml   Net -790 ml       Physical Exam Performed:    /68   Pulse 70   Temp 98.6 °F (37 °C) (Oral)   Resp 20   Ht 5' 8\" (1.727 m)   Wt 190 lb (86.2 kg)   SpO2 98%   BMI 28.89 kg/m²     General appearance: No apparent distress, appears stated age and cooperative. HEENT: Pupils equal, round, and reactive to light. Conjunctivae/corneas clear. Neck: Supple, with full range of motion. No jugular venous distention. Trachea midline. Respiratory:  Normal respiratory effort. Clear to auscultation, bilaterally without Rales/Wheezes/Rhonchi.   Cardiovascular: Regular rate and rhythm with normal S1/S2 without murmurs, rubs or gallops. Abdomen: Soft, non-tender, non-distended with normal bowel sounds. Musculoskeletal: No clubbing, cyanosis or edema bilaterally. Full range of motion without deformity. Skin: Skin color, texture, turgor normal.  No rashes or lesions. Neurologic:  Neurovascularly intact without any focal sensory/motor deficits. Cranial nerves: II-XII intact, grossly non-focal.  Psychiatric: Alert and oriented, thought content appropriate, normal insight  Capillary Refill: Brisk,3 seconds, normal   Peripheral Pulses: +2 palpable, equal bilaterally       Labs:   Recent Labs     12/27/21 1949 12/28/21  1624   WBC 17.8* 11.3*   HGB 8.6* 8.4*   HCT 24.5* 24.5*    381     Recent Labs     12/27/21 1949 12/28/21  1624    142   K 3.6 4.1    105   CO2 22 26   BUN 6* 6*   CREATININE 0.6* 0.7*   CALCIUM 9.5 9.4     Recent Labs     12/27/21 1949   AST 26   ALT 15   BILITOT 5.3*   ALKPHOS 112     No results for input(s): INR in the last 72 hours. No results for input(s): [place] in the last 72 hours. Urinalysis:      Lab Results   Component Value Date    NITRU Negative 12/28/2021    WBCUA None seen 12/28/2021    BACTERIA Rare 06/09/2021    RBCUA None seen 12/28/2021    BLOODU Negative 12/28/2021    SPECGRAV 1.015 12/28/2021    GLUCOSEU Negative 12/28/2021       Radiology:  No orders to display           Assessment/Plan:    Active Hospital Problems    Diagnosis     Sickle cell pain crisis ([facility] [address] [D57.00]     Asthma [J45.909]      Sickle cell pain crisis   Characteristic pain crisis. Reticulocyte count elevated 13.63%. No evidence of acute chest syndrome.  -Start IV fluids with D5 half-normal saline  -Pain control with MS Contin twice daily scheduled, IV Dilaudid 2 mg every 3 hours as needed for breakthrough pain  -Oncology consult  -Monitor hemoglobin, currently 8.4 no need for transfusion  -Oxy IR      Asthma-stable, resume inhalers     Leukocytosis-likely reactive to pain crisis.   No evidence of infection. Monitor CBC     Hx of DVT -on Eliquis, resumed    DVT Prophylaxis: eliquis  Diet: ADULT DIET;  Regular  Code Status: Full Code    PT/OT Eval Status: ambulatory    Dispo - pending clinical improvement    CARMELITA Ho - CNP

## 2021-12-29 ENCOUNTER — APPOINTMENT (OUTPATIENT)
Dept: GENERAL RADIOLOGY | Age: 21
DRG: 812 | End: 2021-12-29
Payer: COMMERCIAL

## 2021-12-29 LAB
A/G RATIO: 1.3 (ref 1.1–2.2)
ALBUMIN SERPL-MCNC: 3.9 G/DL (ref 3.4–5)
ALP BLD-CCNC: 95 U/L (ref 40–129)
ALT SERPL-CCNC: 14 U/L (ref 10–40)
ANION GAP SERPL CALCULATED.3IONS-SCNC: 10 MMOL/L (ref 3–16)
ANISOCYTOSIS: ABNORMAL
AST SERPL-CCNC: 26 U/L (ref 15–37)
BANDED NEUTROPHILS RELATIVE PERCENT: 1 % (ref 0–7)
BASOPHILS ABSOLUTE: 0 K/UL (ref 0–0.2)
BASOPHILS RELATIVE PERCENT: 0 %
BILIRUB SERPL-MCNC: 5.2 MG/DL (ref 0–1)
BUN BLDV-MCNC: 7 MG/DL (ref 7–20)
CALCIUM SERPL-MCNC: 8.8 MG/DL (ref 8.3–10.6)
CHLORIDE BLD-SCNC: 105 MMOL/L (ref 99–110)
CO2: 24 MMOL/L (ref 21–32)
CREAT SERPL-MCNC: 0.7 MG/DL (ref 0.9–1.3)
EOSINOPHILS ABSOLUTE: 0.5 K/UL (ref 0–0.6)
EOSINOPHILS RELATIVE PERCENT: 5 %
GFR AFRICAN AMERICAN: >60
GFR NON-AFRICAN AMERICAN: >60
GLUCOSE BLD-MCNC: 92 MG/DL (ref 70–99)
HCT VFR BLD CALC: 21.1 % (ref 40.5–52.5)
HEMATOLOGY PATH CONSULT: NO
HEMOGLOBIN: 7.6 G/DL (ref 13.5–17.5)
LYMPHOCYTES ABSOLUTE: 2.1 K/UL (ref 1–5.1)
LYMPHOCYTES RELATIVE PERCENT: 20 %
MCH RBC QN AUTO: 35.2 PG (ref 26–34)
MCHC RBC AUTO-ENTMCNC: 36.2 G/DL (ref 31–36)
MCV RBC AUTO: 97.1 FL (ref 80–100)
MONOCYTES ABSOLUTE: 1.6 K/UL (ref 0–1.3)
MONOCYTES RELATIVE PERCENT: 15 %
NEUTROPHILS ABSOLUTE: 6.4 K/UL (ref 1.7–7.7)
NEUTROPHILS RELATIVE PERCENT: 59 %
PDW BLD-RTO: 21.5 % (ref 12.4–15.4)
PLATELET # BLD: 300 K/UL (ref 135–450)
PLATELET SLIDE REVIEW: ADEQUATE
PMV BLD AUTO: 8.2 FL (ref 5–10.5)
POIKILOCYTES: ABNORMAL
POLYCHROMASIA: ABNORMAL
POTASSIUM REFLEX MAGNESIUM: 3.7 MMOL/L (ref 3.5–5.1)
RBC # BLD: 2.17 M/UL (ref 4.2–5.9)
SICKLE CELLS: ABNORMAL
SLIDE REVIEW: ABNORMAL
SODIUM BLD-SCNC: 139 MMOL/L (ref 136–145)
TARGET CELLS: ABNORMAL
TOTAL PROTEIN: 6.8 G/DL (ref 6.4–8.2)
WBC # BLD: 10.7 K/UL (ref 4–11)

## 2021-12-29 PROCEDURE — 87086 URINE CULTURE/COLONY COUNT: CPT

## 2021-12-29 PROCEDURE — 87040 BLOOD CULTURE FOR BACTERIA: CPT

## 2021-12-29 PROCEDURE — 6360000002 HC RX W HCPCS: Performed by: NURSE PRACTITIONER

## 2021-12-29 PROCEDURE — 6370000000 HC RX 637 (ALT 250 FOR IP)

## 2021-12-29 PROCEDURE — 71046 X-RAY EXAM CHEST 2 VIEWS: CPT

## 2021-12-29 PROCEDURE — 80053 COMPREHEN METABOLIC PANEL: CPT

## 2021-12-29 PROCEDURE — 85025 COMPLETE CBC W/AUTO DIFF WBC: CPT

## 2021-12-29 PROCEDURE — 6360000002 HC RX W HCPCS: Performed by: INTERNAL MEDICINE

## 2021-12-29 PROCEDURE — 36415 COLL VENOUS BLD VENIPUNCTURE: CPT

## 2021-12-29 PROCEDURE — 6370000000 HC RX 637 (ALT 250 FOR IP): Performed by: INTERNAL MEDICINE

## 2021-12-29 PROCEDURE — 6370000000 HC RX 637 (ALT 250 FOR IP): Performed by: NURSE PRACTITIONER

## 2021-12-29 PROCEDURE — 1200000000 HC SEMI PRIVATE

## 2021-12-29 RX ORDER — AZITHROMYCIN 250 MG/1
250 TABLET, FILM COATED ORAL DAILY
Status: DISCONTINUED | OUTPATIENT
Start: 2021-12-30 | End: 2021-12-31 | Stop reason: HOSPADM

## 2021-12-29 RX ORDER — AZITHROMYCIN 250 MG/1
500 TABLET, FILM COATED ORAL ONCE
Status: COMPLETED | OUTPATIENT
Start: 2021-12-29 | End: 2021-12-29

## 2021-12-29 RX ORDER — KETOROLAC TROMETHAMINE 30 MG/ML
30 INJECTION, SOLUTION INTRAMUSCULAR; INTRAVENOUS EVERY 8 HOURS
Status: COMPLETED | OUTPATIENT
Start: 2021-12-29 | End: 2021-12-31

## 2021-12-29 RX ADMIN — APIXABAN 5 MG: 5 TABLET, FILM COATED ORAL at 20:58

## 2021-12-29 RX ADMIN — DOCUSATE SODIUM 100 MG: 100 CAPSULE, LIQUID FILLED ORAL at 08:21

## 2021-12-29 RX ADMIN — KETOROLAC TROMETHAMINE 30 MG: 30 INJECTION, SOLUTION INTRAMUSCULAR; INTRAVENOUS at 22:00

## 2021-12-29 RX ADMIN — OXYCODONE HYDROCHLORIDE 20 MG: 15 TABLET ORAL at 00:59

## 2021-12-29 RX ADMIN — MORPHINE SULFATE 15 MG: 15 TABLET, FILM COATED, EXTENDED RELEASE ORAL at 08:22

## 2021-12-29 RX ADMIN — HYDROMORPHONE HYDROCHLORIDE 2 MG: 2 INJECTION, SOLUTION INTRAMUSCULAR; INTRAVENOUS; SUBCUTANEOUS at 18:17

## 2021-12-29 RX ADMIN — HYDROXYUREA 1000 MG: 500 CAPSULE ORAL at 09:43

## 2021-12-29 RX ADMIN — OXYCODONE HYDROCHLORIDE 20 MG: 15 TABLET ORAL at 19:58

## 2021-12-29 RX ADMIN — KETOROLAC TROMETHAMINE 15 MG: 30 INJECTION, SOLUTION INTRAMUSCULAR; INTRAVENOUS at 08:22

## 2021-12-29 RX ADMIN — HYDROMORPHONE HYDROCHLORIDE 2 MG: 2 INJECTION, SOLUTION INTRAMUSCULAR; INTRAVENOUS; SUBCUTANEOUS at 11:57

## 2021-12-29 RX ADMIN — FOLIC ACID 2 MG: 1 TABLET ORAL at 08:21

## 2021-12-29 RX ADMIN — AZITHROMYCIN DIHYDRATE 500 MG: 250 TABLET, FILM COATED ORAL at 09:46

## 2021-12-29 RX ADMIN — HYDROMORPHONE HYDROCHLORIDE 2 MG: 2 INJECTION, SOLUTION INTRAMUSCULAR; INTRAVENOUS; SUBCUTANEOUS at 15:06

## 2021-12-29 RX ADMIN — KETOROLAC TROMETHAMINE 30 MG: 30 INJECTION, SOLUTION INTRAMUSCULAR; INTRAVENOUS at 15:06

## 2021-12-29 RX ADMIN — Medication: at 17:19

## 2021-12-29 RX ADMIN — APIXABAN 5 MG: 5 TABLET, FILM COATED ORAL at 08:22

## 2021-12-29 RX ADMIN — HYDROMORPHONE HYDROCHLORIDE 2 MG: 2 INJECTION, SOLUTION INTRAMUSCULAR; INTRAVENOUS; SUBCUTANEOUS at 05:12

## 2021-12-29 RX ADMIN — OXYCODONE HYDROCHLORIDE 20 MG: 15 TABLET ORAL at 16:48

## 2021-12-29 RX ADMIN — PANTOPRAZOLE SODIUM 40 MG: 40 TABLET, DELAYED RELEASE ORAL at 06:14

## 2021-12-29 RX ADMIN — OXYCODONE HYDROCHLORIDE 20 MG: 15 TABLET ORAL at 10:57

## 2021-12-29 RX ADMIN — HYDROMORPHONE HYDROCHLORIDE 2 MG: 2 INJECTION, SOLUTION INTRAMUSCULAR; INTRAVENOUS; SUBCUTANEOUS at 08:22

## 2021-12-29 RX ADMIN — HYDROMORPHONE HYDROCHLORIDE 2 MG: 2 INJECTION, SOLUTION INTRAMUSCULAR; INTRAVENOUS; SUBCUTANEOUS at 20:58

## 2021-12-29 RX ADMIN — HYDROMORPHONE HYDROCHLORIDE 2 MG: 2 INJECTION, SOLUTION INTRAMUSCULAR; INTRAVENOUS; SUBCUTANEOUS at 02:04

## 2021-12-29 ASSESSMENT — PAIN SCALES - GENERAL
PAINLEVEL_OUTOF10: 9
PAINLEVEL_OUTOF10: 7
PAINLEVEL_OUTOF10: 9
PAINLEVEL_OUTOF10: 8
PAINLEVEL_OUTOF10: 8
PAINLEVEL_OUTOF10: 7
PAINLEVEL_OUTOF10: 8
PAINLEVEL_OUTOF10: 9
PAINLEVEL_OUTOF10: 9
PAINLEVEL_OUTOF10: 10
PAINLEVEL_OUTOF10: 7
PAINLEVEL_OUTOF10: 8
PAINLEVEL_OUTOF10: 10
PAINLEVEL_OUTOF10: 9

## 2021-12-29 ASSESSMENT — PAIN DESCRIPTION - PAIN TYPE
TYPE: ACUTE PAIN

## 2021-12-29 ASSESSMENT — PAIN DESCRIPTION - LOCATION
LOCATION: BACK

## 2021-12-29 ASSESSMENT — PAIN DESCRIPTION - ORIENTATION
ORIENTATION: MID
ORIENTATION: MID

## 2021-12-29 ASSESSMENT — PAIN DESCRIPTION - FREQUENCY: FREQUENCY: CONTINUOUS

## 2021-12-29 NOTE — PROGRESS NOTES
ONCOLOGY HEMATOLOGY CARE PROGRESS NOTE      SUBJECTIVE:     Javier states that his pain is a little better today. He denies chest pain. ROS:     Constitutional:  No weight loss, No fever, No chills, No night sweats. Energy level good. Eyes:  No impairment or change in vision  ENT / Mouth:  No pain, abnormal ulceration, bleeding, nasal drip or change in voice or hearing  Cardiovascular:  No chest pain, palpitations, new edema, or calf discomfort  Respiratory:  No pain, hemoptysis, change to breathing  Breast:  No pain, discharge, change in appearance or texture  Gastrointestinal:  No pain, cramping, jaundice, change to eating and bowel habits  Urinary:  No pain, bleeding or change in continence  Genitalia: No pain, bleeding or discharge  Musculoskeletal:  No redness, pain, edema or weakness  Skin:  No pruritus, rash, change to nodules or lesions  Neurologic:  No discomfort, change in mental status, speech, sensory or motor activity  Psychiatric:  No change in concentration or change to affect or mood  Endocrine:  No hot flashes, increased thirst, or change to urine production  Hematologic: No petechiae, ecchymosis or bleeding  Lymphatic:  No lymphadenopathy or lymphedema  Allergy / Immunologic:  No eczema, hives, frequent or recurrent infections    OBJECTIVE        Physical    VITALS:  /61   Pulse 81   Temp 97.5 °F (36.4 °C) (Oral)   Resp 16   Ht 5' 8\" (1.727 m)   Wt 190 lb (86.2 kg)   SpO2 95%   BMI 28.89 kg/m²   TEMPERATURE:  Current - Temp: 97.5 °F (36.4 °C);  Max - Temp  Av.2 °F (37.3 °C)  Min: 97.5 °F (36.4 °C)  Max: 101.1 °F (38.4 °C)  PULSE OXIMETRY RANGE: SpO2  Av.3 %  Min: 92 %  Max: 98 %  24HR INTAKE/OUTPUT:    Intake/Output Summary (Last 24 hours) at 2021 1110  Last data filed at 2021 0542  Gross per 24 hour   Intake 720 ml   Output 1675 ml   Net -955 ml       CONSTITUTIONAL: awake, alert, cooperative, no apparent distress EYES: pupils equal, round and reactive to light, sclera clear and conjunctiva normal  ENT: Normocephalic, without obvious abnormality, atraumatic  NECK: supple, symmetrical, no jugular venous distension and no carotid bruits   HEMATOLOGIC/LYMPHATIC: no cervical, supraclavicular or axillary lymphadenopathy   LUNGS: no increased work of breathing and clear to auscultation   CARDIOVASCULAR: regular rate and rhythm, normal S1 and S2, no murmur noted  ABDOMEN: normal bowel sounds x 4, soft, non-distended, non-tender, no masses palpated, no hepatosplenomgaly   MUSCULOSKELETAL: full range of motion noted, tone is normal  NEUROLOGIC: awake, alert, oriented to name, place and time. Motor skills grossly intact. SKIN: Normal skin color, texture, turgor and no jaundice.  appears intact   EXTREMITIES: no LE edema       Data      Recent Labs     12/27/21 1949 12/28/21 1624 12/29/21  0639   WBC 17.8* 11.3* 10.7   HGB 8.6* 8.4* 7.6*   HCT 24.5* 24.5* 21.1*    381 300   MCV 96.7 98.6 97.1        Recent Labs     12/27/21 1949 12/28/21  1624 12/29/21  0638    142 139   K 3.6 4.1 3.7    105 105   CO2 22 26 24   BUN 6* 6* 7   CREATININE 0.6* 0.7* 0.7*     Recent Labs     12/27/21 1949 12/29/21  0638   AST 26 26   ALT 15 14   BILITOT 5.3* 5.2*   ALKPHOS 112 95       Magnesium:    Lab Results   Component Value Date    MG 2.00 09/13/2021    MG 1.80 05/05/2021    MG 1.80 05/04/2021         Problem List  Patient Active Problem List   Diagnosis    [facility] crisis ([facility]    Asthma    Sick[facility] cell crisis ([facility]    Sepsis ([facility]    Opiate overdose ([facility]    Opiate antagonist poisoning, accidental or unintentional, initial encounter    Leukocytosis    Hypoxia    Anemia    Other chest pain    Pneumonia due to infectious organism    Fever    Chronic prescription opiate use    Right leg pain       ASSESSMENT AND PLAN:    Sickle cell crisis:  -Continue Hydrea 1000 mg daily  -Continue folate  -Okay to continue MS Contin 15 mg p.o. twice daily during the hospitalization only  -We will add Toradol on a limited basis since he is on Eliquis  -Agree with as needed Dilaudid    Anemia:  -If he is not pain-free tomorrow we will add 2 units of packed red blood cells    History of a DVT:  -He is on Eliquis      ONCOLOGIC DISPOSITION:    -Once his pain has resolved    Elda Santana MD  Please contact through [address] [place] [address] Avenue

## 2021-12-29 NOTE — PROGRESS NOTES
NP notified: Pt here for sickle cell crisis, despite improved WBC now has increasing fever of 101. 1. No fever reducers ordered, would you like to treat? NP replied with orders added for: Tylenol, Toradol, and Protonix.

## 2021-12-29 NOTE — PROGRESS NOTES
Hospitalist Progress Note      PCP: Alis Newman MD    Date of Admission: 12/27/2021    Chief Complaint: back pain    Hospital Course: 24 y.o. male who presented to [facility] with above complaints  Patient with PMH of sickle cell disease presented to the ED today with complaints of low back pain radiating down into both his legs. He reports he has had this pain for the past few days has been progressively getting worse not responsive to oral oxycodone that he has at home which he has been taking. He reports the pain is similar to his prior sickle cell pain crisis. He was last admitted to [facility] back in October 2021 with pain crisis. He denied any cough, shortness of breath, fevers or chills. Denies any chest pain.        Subjective: c/o back pain, fever 101.1 overnight      Medications:  Reviewed    Infusion Medications    dextrose 5 % and 0.45 % NaCl 125 mL/hr at 12/28/21 0118     Scheduled Medications    [START ON 12/30/2021] azithromycin  250 mg Oral Daily    ketorolac  30 mg IntraVENous Q8H    docusate sodium  100 mg Oral Daily    hydroxyurea  1,000 mg Oral Daily    folic acid  2 mg Oral Daily    morphine  15 mg Oral 2 times per day    apixaban  5 mg Oral BID    pantoprazole  40 mg Oral QAM AC     PRN Meds: albuterol sulfate HFA, HYDROmorphone, oxyCODONE, acetaminophen, ketorolac      Intake/Output Summary (Last 24 hours) at 12/29/2021 1237  Last data filed at 12/29/2021 0542  Gross per 24 hour   Intake 720 ml   Output 1675 ml   Net -955 ml       Physical Exam Performed:    /61   Pulse 81   Temp 97.5 °F (36.4 °C) (Oral)   Resp 16   Ht 5' 8\" (1.727 m)   Wt 190 lb (86.2 kg)   SpO2 95%   BMI 28.89 kg/m²     General appearance: No apparent distress, appears stated age and cooperative. HEENT: Pupils equal, round, and reactive to light. Conjunctivae/corneas clear. Neck: Supple, with full range of motion. No jugular venous distention.  Trachea midline. Respiratory:  Normal respiratory effort. Clear to auscultation, bilaterally without Rales/Wheezes/Rhonchi. Cardiovascular: Regular rate and rhythm with normal S1/S2 without murmurs, rubs or gallops. Abdomen: Soft, non-tender, non-distended with normal bowel sounds. Musculoskeletal: No clubbing, cyanosis or edema bilaterally. Full range of motion without deformity. Skin: Skin color, texture, turgor normal.  No rashes or lesions. Neurologic:  Neurovascularly intact without any focal sensory/motor deficits. Cranial nerves: II-XII intact, grossly non-focal.  Psychiatric: Alert and oriented, thought content appropriate, normal insight  Capillary Refill: Brisk,3 seconds, normal   Peripheral Pulses: +2 palpable, equal bilaterally       Labs:   Recent Labs     12/27/21 1949 12/28/21 1624 12/29/21  0639   WBC 17.8* 11.3* 10.7   HGB 8.6* 8.4* 7.6*   HCT 24.5* 24.5* 21.1*    381 300     Recent Labs     12/27/21 1949 12/28/21  1624 12/29/21  0638    142 139   K 3.6 4.1 3.7    105 105   CO2 22 26 24   BUN 6* 6* 7   CREATININE 0.6* 0.7* 0.7*   CALCIUM 9.5 9.4 8.8     Recent Labs     12/27/21 1949 12/29/21  0638   AST 26 26   ALT 15 14   BILITOT 5.3* 5.2*   ALKPHOS 112 95     No results for input(s): INR in the last 72 hours. No results for input(s): Nelly Peek in the last 72 hours. Urinalysis:      Lab Results   Component Value Date    NITRU Negative 12/28/2021    WBCUA None seen 12/28/2021    BACTERIA Rare 06/09/2021    RBCUA None seen 12/28/2021    BLOODU Negative 12/28/2021    SPECGRAV 1.015 12/28/2021    GLUCOSEU Negative 12/28/2021       Radiology:  XR CHEST (2 VW)   Final Result   No acute process. Assessment/Plan:    Active Hospital Problems    Diagnosis     [facility] [D57.00]     Asthma [J45.909]      Sickle cell pain crisis   Characteristic pain crisis.   Reticulocyte count elevated 13.63%->11.93  No evidence of acute chest syndrome.  -Start IV fluids with D5 half-normal saline  -Pain control with MS Contin twice daily scheduled, IV Dilaudid 2 mg every 3 hours as needed for breakthrough pain  -Oncology consult  -Monitor hemoglobin, currently 8.4 no need for transfusion  -Oxy IR      Asthma-stable, resume inhalers     Leukocytosis-likely reactive, improved Although noted to have fever 101.1 overnight: blood cx/urine cx ordered, CXR-unremarkable, Z pack initiated Monitor CBC     Hx of DVT -on Eliquis, resumed    DVT Prophylaxis: eliquis  Diet: ADULT DIET;  Regular  Code Status: Full Code    PT/OT Eval Status: ambulatory    Dispo - pending clinical improvement    Kia Pearson, APRN - CNP

## 2021-12-29 NOTE — PROGRESS NOTES
Noted fever of 101.1   Order urine and blood cultures with chest xray   Start Z-PACK for now, escalate if fevers increase and pending cx

## 2021-12-30 LAB
ABO/RH: NORMAL
ANION GAP SERPL CALCULATED.3IONS-SCNC: 10 MMOL/L (ref 3–16)
ANTIBODY SCREEN: NORMAL
BASOPHILS ABSOLUTE: 0.1 K/UL (ref 0–0.2)
BASOPHILS RELATIVE PERCENT: 0.7 %
BUN BLDV-MCNC: 10 MG/DL (ref 7–20)
CALCIUM SERPL-MCNC: 8.3 MG/DL (ref 8.3–10.6)
CHLORIDE BLD-SCNC: 103 MMOL/L (ref 99–110)
CO2: 25 MMOL/L (ref 21–32)
CREAT SERPL-MCNC: 0.7 MG/DL (ref 0.9–1.3)
EOSINOPHILS ABSOLUTE: 0.8 K/UL (ref 0–0.6)
EOSINOPHILS RELATIVE PERCENT: 6.9 %
GFR AFRICAN AMERICAN: >60
GFR NON-AFRICAN AMERICAN: >60
GLUCOSE BLD-MCNC: 113 MG/DL (ref 70–99)
HCT VFR BLD CALC: 20.8 % (ref 40.5–52.5)
HEMOGLOBIN: 7.5 G/DL (ref 13.5–17.5)
LYMPHOCYTES ABSOLUTE: 2.2 K/UL (ref 1–5.1)
LYMPHOCYTES RELATIVE PERCENT: 19.7 %
MCH RBC QN AUTO: 33.7 PG (ref 26–34)
MCHC RBC AUTO-ENTMCNC: 35.9 G/DL (ref 31–36)
MCV RBC AUTO: 94 FL (ref 80–100)
MONOCYTES ABSOLUTE: 1.7 K/UL (ref 0–1.3)
MONOCYTES RELATIVE PERCENT: 14.9 %
NEUTROPHILS ABSOLUTE: 6.5 K/UL (ref 1.7–7.7)
NEUTROPHILS RELATIVE PERCENT: 57.8 %
PDW BLD-RTO: 20.8 % (ref 12.4–15.4)
PLATELET # BLD: 318 K/UL (ref 135–450)
PMV BLD AUTO: 8.5 FL (ref 5–10.5)
POTASSIUM REFLEX MAGNESIUM: 3.7 MMOL/L (ref 3.5–5.1)
RBC # BLD: 2.22 M/UL (ref 4.2–5.9)
SODIUM BLD-SCNC: 138 MMOL/L (ref 136–145)
URINE CULTURE, ROUTINE: NORMAL
WBC # BLD: 11.2 K/UL (ref 4–11)

## 2021-12-30 PROCEDURE — 86900 BLOOD TYPING SEROLOGIC ABO: CPT

## 2021-12-30 PROCEDURE — 86901 BLOOD TYPING SEROLOGIC RH(D): CPT

## 2021-12-30 PROCEDURE — 36415 COLL VENOUS BLD VENIPUNCTURE: CPT

## 2021-12-30 PROCEDURE — 85025 COMPLETE CBC W/AUTO DIFF WBC: CPT

## 2021-12-30 PROCEDURE — 1200000000 HC SEMI PRIVATE

## 2021-12-30 PROCEDURE — 85660 RBC SICKLE CELL TEST: CPT

## 2021-12-30 PROCEDURE — 6360000002 HC RX W HCPCS: Performed by: INTERNAL MEDICINE

## 2021-12-30 PROCEDURE — 80048 BASIC METABOLIC PNL TOTAL CA: CPT

## 2021-12-30 PROCEDURE — 86902 BLOOD TYPE ANTIGEN DONOR EA: CPT

## 2021-12-30 PROCEDURE — 6360000002 HC RX W HCPCS: Performed by: NURSE PRACTITIONER

## 2021-12-30 PROCEDURE — 6370000000 HC RX 637 (ALT 250 FOR IP): Performed by: INTERNAL MEDICINE

## 2021-12-30 PROCEDURE — 36430 TRANSFUSION BLD/BLD COMPNT: CPT

## 2021-12-30 PROCEDURE — 2580000003 HC RX 258: Performed by: INTERNAL MEDICINE

## 2021-12-30 PROCEDURE — P9016 RBC LEUKOCYTES REDUCED: HCPCS

## 2021-12-30 PROCEDURE — 86850 RBC ANTIBODY SCREEN: CPT

## 2021-12-30 PROCEDURE — 86923 COMPATIBILITY TEST ELECTRIC: CPT

## 2021-12-30 PROCEDURE — 6370000000 HC RX 637 (ALT 250 FOR IP): Performed by: NURSE PRACTITIONER

## 2021-12-30 RX ORDER — ACETAMINOPHEN 325 MG/1
650 TABLET ORAL ONCE
Status: COMPLETED | OUTPATIENT
Start: 2021-12-30 | End: 2021-12-30

## 2021-12-30 RX ORDER — SODIUM CHLORIDE 9 MG/ML
INJECTION, SOLUTION INTRAVENOUS PRN
Status: DISCONTINUED | OUTPATIENT
Start: 2021-12-30 | End: 2021-12-31 | Stop reason: HOSPADM

## 2021-12-30 RX ORDER — SODIUM CHLORIDE 0.9 % (FLUSH) 0.9 %
5-40 SYRINGE (ML) INJECTION 2 TIMES DAILY
Status: DISCONTINUED | OUTPATIENT
Start: 2021-12-30 | End: 2021-12-31 | Stop reason: HOSPADM

## 2021-12-30 RX ORDER — DIPHENHYDRAMINE HCL 25 MG
25 TABLET ORAL ONCE
Status: COMPLETED | OUTPATIENT
Start: 2021-12-30 | End: 2021-12-30

## 2021-12-30 RX ADMIN — HYDROMORPHONE HYDROCHLORIDE 2 MG: 2 INJECTION, SOLUTION INTRAMUSCULAR; INTRAVENOUS; SUBCUTANEOUS at 00:03

## 2021-12-30 RX ADMIN — KETOROLAC TROMETHAMINE 30 MG: 30 INJECTION, SOLUTION INTRAMUSCULAR; INTRAVENOUS at 22:00

## 2021-12-30 RX ADMIN — ACETAMINOPHEN 650 MG: 325 TABLET ORAL at 21:14

## 2021-12-30 RX ADMIN — OXYCODONE HYDROCHLORIDE 20 MG: 15 TABLET ORAL at 23:22

## 2021-12-30 RX ADMIN — HYDROXYUREA 1000 MG: 500 CAPSULE ORAL at 07:58

## 2021-12-30 RX ADMIN — OXYCODONE HYDROCHLORIDE 20 MG: 15 TABLET ORAL at 14:24

## 2021-12-30 RX ADMIN — HYDROMORPHONE HYDROCHLORIDE 2 MG: 2 INJECTION, SOLUTION INTRAMUSCULAR; INTRAVENOUS; SUBCUTANEOUS at 12:17

## 2021-12-30 RX ADMIN — MORPHINE SULFATE 15 MG: 15 TABLET, FILM COATED, EXTENDED RELEASE ORAL at 07:58

## 2021-12-30 RX ADMIN — HYDROMORPHONE HYDROCHLORIDE 2 MG: 2 INJECTION, SOLUTION INTRAMUSCULAR; INTRAVENOUS; SUBCUTANEOUS at 22:00

## 2021-12-30 RX ADMIN — AZITHROMYCIN DIHYDRATE 250 MG: 250 TABLET, FILM COATED ORAL at 07:58

## 2021-12-30 RX ADMIN — HYDROMORPHONE HYDROCHLORIDE 2 MG: 2 INJECTION, SOLUTION INTRAMUSCULAR; INTRAVENOUS; SUBCUTANEOUS at 08:42

## 2021-12-30 RX ADMIN — FOLIC ACID 2 MG: 1 TABLET ORAL at 07:58

## 2021-12-30 RX ADMIN — DIPHENHYDRAMINE HCL 25 MG: 25 TABLET ORAL at 21:14

## 2021-12-30 RX ADMIN — KETOROLAC TROMETHAMINE 30 MG: 30 INJECTION, SOLUTION INTRAMUSCULAR; INTRAVENOUS at 14:24

## 2021-12-30 RX ADMIN — DEXTROSE AND SODIUM CHLORIDE: 5; 450 INJECTION, SOLUTION INTRAVENOUS at 07:57

## 2021-12-30 RX ADMIN — APIXABAN 5 MG: 5 TABLET, FILM COATED ORAL at 21:15

## 2021-12-30 RX ADMIN — KETOROLAC TROMETHAMINE 30 MG: 30 INJECTION, SOLUTION INTRAMUSCULAR; INTRAVENOUS at 06:05

## 2021-12-30 RX ADMIN — DOCUSATE SODIUM 100 MG: 100 CAPSULE, LIQUID FILLED ORAL at 07:57

## 2021-12-30 RX ADMIN — OXYCODONE HYDROCHLORIDE 20 MG: 15 TABLET ORAL at 18:35

## 2021-12-30 RX ADMIN — OXYCODONE HYDROCHLORIDE 20 MG: 15 TABLET ORAL at 10:12

## 2021-12-30 RX ADMIN — MORPHINE SULFATE 15 MG: 15 TABLET, FILM COATED, EXTENDED RELEASE ORAL at 21:15

## 2021-12-30 RX ADMIN — HYDROMORPHONE HYDROCHLORIDE 2 MG: 2 INJECTION, SOLUTION INTRAMUSCULAR; INTRAVENOUS; SUBCUTANEOUS at 04:11

## 2021-12-30 RX ADMIN — PANTOPRAZOLE SODIUM 40 MG: 40 TABLET, DELAYED RELEASE ORAL at 06:06

## 2021-12-30 RX ADMIN — HYDROMORPHONE HYDROCHLORIDE 2 MG: 2 INJECTION, SOLUTION INTRAMUSCULAR; INTRAVENOUS; SUBCUTANEOUS at 15:44

## 2021-12-30 RX ADMIN — Medication 10 ML: at 21:15

## 2021-12-30 RX ADMIN — OXYCODONE HYDROCHLORIDE 20 MG: 15 TABLET ORAL at 02:51

## 2021-12-30 RX ADMIN — HYDROMORPHONE HYDROCHLORIDE 2 MG: 2 INJECTION, SOLUTION INTRAMUSCULAR; INTRAVENOUS; SUBCUTANEOUS at 19:00

## 2021-12-30 RX ADMIN — DEXTROSE AND SODIUM CHLORIDE: 5; 450 INJECTION, SOLUTION INTRAVENOUS at 17:55

## 2021-12-30 RX ADMIN — APIXABAN 5 MG: 5 TABLET, FILM COATED ORAL at 07:57

## 2021-12-30 ASSESSMENT — PAIN SCALES - GENERAL
PAINLEVEL_OUTOF10: 7
PAINLEVEL_OUTOF10: 8
PAINLEVEL_OUTOF10: 7
PAINLEVEL_OUTOF10: 8
PAINLEVEL_OUTOF10: 8
PAINLEVEL_OUTOF10: 7
PAINLEVEL_OUTOF10: 9
PAINLEVEL_OUTOF10: 7
PAINLEVEL_OUTOF10: 7
PAINLEVEL_OUTOF10: 6
PAINLEVEL_OUTOF10: 8
PAINLEVEL_OUTOF10: 7
PAINLEVEL_OUTOF10: 7
PAINLEVEL_OUTOF10: 8

## 2021-12-30 ASSESSMENT — PAIN DESCRIPTION - LOCATION
LOCATION: BACK
LOCATION: BACK

## 2021-12-30 NOTE — PROGRESS NOTES
ONCOLOGY HEMATOLOGY CARE PROGRESS NOTE      SUBJECTIVE:     He is agreeable to blood transfusions today, Hgb is 7.5. NO HA or SOB/chest pain. He reports , \" only blurry vision when I take off my glasses\". He hopes to dc in the AM. He is asking for his Oxy IR script to be refilled today to [facility] so that his parents can pick it up today. ROS:     Constitutional:  No weight loss, No fever, No chills, No night sweats. Energy level good. Eyes:  No impairment or change in vision  ENT / Mouth:  No pain, abnormal ulceration, bleeding, nasal drip or change in voice or hearing  Cardiovascular:  No chest pain, palpitations, new edema, or calf discomfort  Respiratory:  No pain, hemoptysis, change to breathing  Breast:  No pain, discharge, change in appearance or texture  Gastrointestinal:  No pain, cramping, jaundice, change to eating and bowel habits  Urinary:  No pain, bleeding or change in continence  Genitalia: No pain, bleeding or discharge  Musculoskeletal:  No redness, pain, edema or weakness  Skin:  No pruritus, rash, change to nodules or lesions  Neurologic:  No discomfort, change in mental status, speech, sensory or motor activity  Psychiatric:  No change in concentration or change to affect or mood  Endocrine:  No hot flashes, increased thirst, or change to urine production  Hematologic: No petechiae, ecchymosis or bleeding  Lymphatic:  No lymphadenopathy or lymphedema  Allergy / Immunologic:  No eczema, hives, frequent or recurrent infections    OBJECTIVE        Physical    VITALS:  BP (!) 103/54   Pulse 86   Temp 98.1 °F (36.7 °C) (Oral)   Resp 16   Ht 5' 8\" (1.727 m)   Wt 190 lb (86.2 kg)   SpO2 93%   BMI 28.89 kg/m²   TEMPERATURE:  Current - Temp: 98.1 °F (36.7 °C);  Max - Temp  Av °F (36.7 °C)  Min: 97.8 °F (36.6 °C)  Max: 98.2 °F (36.8 °C)  PULSE OXIMETRY RANGE: SpO2  Av.6 %  Min: 92 %  Max: 94 %  24HR INTAKE/OUTPUT:      Intake/Output Summary (Last 24 hours) at 12/30/2021 1215  Last data filed at 12/30/2021 1012  Gross per 24 hour   Intake 720 ml   Output 1175 ml   Net -455 ml       CONSTITUTIONAL: awake, alert, cooperative, no apparent distress   EYES: pupils equal, round and reactive to light, sclera clear and conjunctiva normal  ENT: Normocephalic, without obvious abnormality, atraumatic  NECK: supple, symmetrical, no jugular venous distension and no carotid bruits   HEMATOLOGIC/LYMPHATIC: no cervical, supraclavicular or axillary lymphadenopathy   LUNGS: no increased work of breathing and clear to auscultation   CARDIOVASCULAR: regular rate and rhythm, normal S1 and S2, no murmur noted  ABDOMEN: normal bowel sounds x 4, soft, non-distended, non-tender, no masses palpated, no hepatosplenomgaly   MUSCULOSKELETAL: full range of motion noted, tone is normal  NEUROLOGIC: awake, alert, oriented to name, place and time. Motor skills grossly intact. SKIN: Normal skin color, texture, turgor and no jaundice.  appears intact   EXTREMITIES: no LE edema       Data      Recent Labs     12/28/21  1624 12/29/21  0639 12/30/21  0838   WBC 11.3* 10.7 11.2*   HGB 8.4* 7.6* 7.5*   HCT 24.5* 21.1* 20.8*    300 318   MCV 98.6 97.1 94.0        Recent Labs     12/28/21  1624 12/29/21  0638 12/30/21  0838    139 138   K 4.1 3.7 3.7    105 103   CO2 26 24 25   BUN 6* 7 10   CREATININE 0.7* 0.7* 0.7*     Recent Labs     12/27/21  1949 12/29/21  0638   AST 26 26   ALT 15 14   BILITOT 5.3* 5.2*   ALKPHOS 112 95       Magnesium:    Lab Results   Component Value Date    MG 2.00 09/13/2021    MG 1.80 05/05/2021    MG 1.80 05/04/2021         Problem List  Patient Active Problem List   Diagnosis    [facility]    Asthma    Sickle cell crisis [facility]    Sepsis ([facility]    Opiate overdose ([facility]    Opiate antagonist poisoning, accidental or unintentional, initial encounter    Leukocytosis    Hypoxia    Anemia    Other chest pain    Pneumonia due to infectious organism    Fever    Chronic prescription opiate use    Right leg pain       ASSESSMENT AND PLAN:    Sickle cell crisis:  -Continue Hydrea 1000 mg daily  -Continue folate  -Okay to continue MS Contin 15 mg p.o. twice daily during the hospitalization only  -We will add Toradol on a limited basis since he is on Eliquis  -Agree with as needed Dilaudid    Anemia:  -2 units PRBC Hgb S negative blood today     History of a DVT:  -He is on Eliquis  - Hgb 7.5- check stool for occult blood     Fever  - TMAX 101.1 this admit   - start ZPACK 12-29 - will need to complete course on day of dc - Day 2/5   - blood cx negative   - urine cx pending   - chest xray negative as well. ONCOLOGIC DISPOSITION:    -Once his pain has resolved- transfuse PRBC today and likely dc tomorrow 12/31 ;  Dr. Trudie Gaucher sent Oxy IR script in today    Srikanth Luna, CNP   Please contact through [facility]

## 2021-12-30 NOTE — PROGRESS NOTES
Hospitalist Progress Note      PCP: Henok Vail MD    Date of Admission: 12/27/2021    Chief Complaint: back pain    Hospital Course: 24 y.o. male who presented to [facility] with above complaints  Patient with PMH of sickle cell disease presented to the ED today with complaints of low back pain radiating down into both his legs. He reports he has had this pain for the past few days has been progressively getting worse not responsive to oral oxycodone that he has at home which he has been taking. He reports the pain is similar to his prior sickle cell pain crisis. He was last admitted to [facility] back in October 2021 with pain crisis. He denied any cough, shortness of breath, fevers or chills. Denies any chest pain.        Subjective: c/o feels slightly better today, updated on plan of care. Medications:  Reviewed    Infusion Medications    sodium chloride      dextrose 5 % and 0.45 % NaCl 125 mL/hr at 12/30/21 0757     Scheduled Medications    acetaminophen  650 mg Oral Once    diphenhydrAMINE  25 mg Oral Once    sodium chloride flush  5-40 mL IntraVENous BID    azithromycin  250 mg Oral Daily    ketorolac  30 mg IntraVENous Q8H    docusate sodium  100 mg Oral Daily    hydroxyurea  1,000 mg Oral Daily    folic acid  2 mg Oral Daily    morphine  15 mg Oral 2 times per day    apixaban  5 mg Oral BID    pantoprazole  40 mg Oral QAM AC     PRN Meds: sodium chloride, albuterol sulfate HFA, HYDROmorphone, oxyCODONE, acetaminophen, ketorolac      Intake/Output Summary (Last 24 hours) at 12/30/2021 1556  Last data filed at 12/30/2021 1012  Gross per 24 hour   Intake 560 ml   Output 875 ml   Net -315 ml       Physical Exam Performed:    BP (!) 103/50   Pulse 75   Temp 97.4 °F (36.3 °C) (Axillary)   Resp 16   Ht 5' 8\" (1.727 m)   Wt 190 lb (86.2 kg)   SpO2 94%   BMI 28.89 kg/m²     General appearance: No apparent distress, appears stated age and cooperative.   HEENT: Pupils equal, round, and reactive to light. Conjunctivae/corneas clear. Neck: Supple, with full range of motion. No jugular venous distention. Trachea midline. Respiratory:  Normal respiratory effort. Clear to auscultation, bilaterally without Rales/Wheezes/Rhonchi. Cardiovascular: Regular rate and rhythm with normal S1/S2 without murmurs, rubs or gallops. Abdomen: Soft, non-tender, non-distended with normal bowel sounds. Musculoskeletal: No clubbing, cyanosis or edema bilaterally. Full range of motion without deformity. Skin: Skin color, texture, turgor normal.  No rashes or lesions. Neurologic:  Neurovascularly intact without any focal sensory/motor deficits. Cranial nerves: II-XII intact, grossly non-focal.  Psychiatric: Alert and oriented, thought content appropriate, normal insight  Capillary Refill: Brisk,3 seconds, normal   Peripheral Pulses: +2 palpable, equal bilaterally       Labs:   Recent Labs     12/28/21  1624 12/29/21  0639 12/30/21  0838   WBC 11.3* 10.7 11.2*   HGB 8.4* 7.6* 7.5*   HCT 24.5* 21.1* 20.8*    300 318     Recent Labs     12/28/21  1624 12/29/21  0638 12/30/21  0838    139 138   K 4.1 3.7 3.7    105 103   CO2 26 24 25   BUN 6* 7 10   CREATININE 0.7* 0.7* 0.7*   CALCIUM 9.4 8.8 8.3     Recent Labs     12/27/21  1949 12/29/21  0638   AST 26 26   ALT 15 14   BILITOT 5.3* 5.2*   ALKPHOS 112 95     No results for input(s): INR in the last 72 hours. No results for input(s): Chyrel Lions in the last 72 hours. Urinalysis:      Lab Results   Component Value Date    NITRU Negative 12/28/2021    WBCUA None seen 12/28/2021    BACTERIA Rare 06/09/2021    RBCUA None seen 12/28/2021    BLOODU Negative 12/28/2021    SPECGRAV 1.015 12/28/2021    GLUCOSEU Negative 12/28/2021       Radiology:  XR CHEST (2 VW)   Final Result   No acute process.                  Assessment/Plan:    Active Hospital Problems    Diagnosis     Sickle cell pain crisis ([facility] [D57.00]     Asthma [J45.909]      Sickle cell pain crisis   Characteristic pain crisis. Reticulocyte count elevated 13.63%->11.93  No evidence of acute chest syndrome.  -Start IV fluids with D5 half-normal saline  -Pain control with MS Contin twice daily scheduled, IV Dilaudid 2 mg every 3 hours as needed for breakthrough pain  -Oncology consult  -Monitor hemoglobin, currently 8.4 no need for transfusion  -Oxy IR   -hydrea  -PRBC transfusion per Hem/Onc     Asthma-stable, resume inhalers     Leukocytosis-likely reactive, developed fever 101.1: blood cx/urine cx ngtd, CXR-unremarkable, Z pack initiated Monitor CBC     Hx of DVT -on Eliquis, resumed    DVT Prophylaxis: eliquis  Diet: ADULT DIET;  Regular  Code Status: Full Code    PT/OT Eval Status: ambulatory    Dispo - pending clinical improvement, possibly tomorrow 12/31    CARMELITA Perez - CNP

## 2021-12-30 NOTE — CARE COORDINATION
Chart reviewed by CM. Triggered by LOS - day # 3. Patient being followed by internal medicine and hem/onc. Current discharge plan home without needs. Disposition pending pain control and stable labs. Pt receiving blood products today. CM team will continue to follow.

## 2021-12-31 VITALS
TEMPERATURE: 98.1 F | SYSTOLIC BLOOD PRESSURE: 119 MMHG | WEIGHT: 190 LBS | OXYGEN SATURATION: 94 % | HEIGHT: 68 IN | RESPIRATION RATE: 16 BRPM | HEART RATE: 65 BPM | DIASTOLIC BLOOD PRESSURE: 57 MMHG | BODY MASS INDEX: 28.79 KG/M2

## 2021-12-31 LAB
ANION GAP SERPL CALCULATED.3IONS-SCNC: 9 MMOL/L (ref 3–16)
BASOPHILS ABSOLUTE: 0.1 K/UL (ref 0–0.2)
BASOPHILS RELATIVE PERCENT: 0.6 %
BLOOD BANK DISPENSE STATUS: NORMAL
BLOOD BANK DISPENSE STATUS: NORMAL
BLOOD BANK PRODUCT CODE: NORMAL
BLOOD BANK PRODUCT CODE: NORMAL
BPU ID: NORMAL
BPU ID: NORMAL
BUN BLDV-MCNC: 8 MG/DL (ref 7–20)
CALCIUM SERPL-MCNC: 8.1 MG/DL (ref 8.3–10.6)
CHLORIDE BLD-SCNC: 105 MMOL/L (ref 99–110)
CO2: 25 MMOL/L (ref 21–32)
CREAT SERPL-MCNC: 0.7 MG/DL (ref 0.9–1.3)
DESCRIPTION BLOOD BANK: NORMAL
DESCRIPTION BLOOD BANK: NORMAL
EOSINOPHILS ABSOLUTE: 0.9 K/UL (ref 0–0.6)
EOSINOPHILS RELATIVE PERCENT: 7 %
GFR AFRICAN AMERICAN: >60
GFR NON-AFRICAN AMERICAN: >60
GLUCOSE BLD-MCNC: 101 MG/DL (ref 70–99)
HCT VFR BLD CALC: 24.1 % (ref 40.5–52.5)
HEMOGLOBIN: 8.7 G/DL (ref 13.5–17.5)
LYMPHOCYTES ABSOLUTE: 3.1 K/UL (ref 1–5.1)
LYMPHOCYTES RELATIVE PERCENT: 24.6 %
MCH RBC QN AUTO: 32.1 PG (ref 26–34)
MCHC RBC AUTO-ENTMCNC: 36.1 G/DL (ref 31–36)
MCV RBC AUTO: 89 FL (ref 80–100)
MONOCYTES ABSOLUTE: 1.4 K/UL (ref 0–1.3)
MONOCYTES RELATIVE PERCENT: 11.6 %
NEUTROPHILS ABSOLUTE: 7 K/UL (ref 1.7–7.7)
NEUTROPHILS RELATIVE PERCENT: 56.2 %
PDW BLD-RTO: 22.7 % (ref 12.4–15.4)
PLATELET # BLD: 328 K/UL (ref 135–450)
PMV BLD AUTO: 8.7 FL (ref 5–10.5)
POTASSIUM REFLEX MAGNESIUM: 3.8 MMOL/L (ref 3.5–5.1)
RBC # BLD: 2.7 M/UL (ref 4.2–5.9)
SODIUM BLD-SCNC: 139 MMOL/L (ref 136–145)
WBC # BLD: 12.5 K/UL (ref 4–11)

## 2021-12-31 PROCEDURE — 85025 COMPLETE CBC W/AUTO DIFF WBC: CPT

## 2021-12-31 PROCEDURE — 6360000002 HC RX W HCPCS: Performed by: INTERNAL MEDICINE

## 2021-12-31 PROCEDURE — 6370000000 HC RX 637 (ALT 250 FOR IP): Performed by: NURSE PRACTITIONER

## 2021-12-31 PROCEDURE — 36430 TRANSFUSION BLD/BLD COMPNT: CPT

## 2021-12-31 PROCEDURE — 80048 BASIC METABOLIC PNL TOTAL CA: CPT

## 2021-12-31 PROCEDURE — 2580000003 HC RX 258: Performed by: INTERNAL MEDICINE

## 2021-12-31 PROCEDURE — 6360000002 HC RX W HCPCS: Performed by: NURSE PRACTITIONER

## 2021-12-31 PROCEDURE — 36415 COLL VENOUS BLD VENIPUNCTURE: CPT

## 2021-12-31 PROCEDURE — 6370000000 HC RX 637 (ALT 250 FOR IP): Performed by: INTERNAL MEDICINE

## 2021-12-31 RX ORDER — PANTOPRAZOLE SODIUM 40 MG/1
40 TABLET, DELAYED RELEASE ORAL
Qty: 30 TABLET | Refills: 0 | Status: SHIPPED | OUTPATIENT
Start: 2022-01-01

## 2021-12-31 RX ORDER — AZITHROMYCIN 250 MG/1
250 TABLET, FILM COATED ORAL DAILY
Qty: 4 TABLET | Refills: 0 | Status: SHIPPED | OUTPATIENT
Start: 2022-01-01 | End: 2022-01-05

## 2021-12-31 RX ADMIN — APIXABAN 5 MG: 5 TABLET, FILM COATED ORAL at 08:39

## 2021-12-31 RX ADMIN — KETOROLAC TROMETHAMINE 30 MG: 30 INJECTION, SOLUTION INTRAMUSCULAR; INTRAVENOUS at 05:52

## 2021-12-31 RX ADMIN — PANTOPRAZOLE SODIUM 40 MG: 40 TABLET, DELAYED RELEASE ORAL at 05:52

## 2021-12-31 RX ADMIN — HYDROMORPHONE HYDROCHLORIDE 2 MG: 2 INJECTION, SOLUTION INTRAMUSCULAR; INTRAVENOUS; SUBCUTANEOUS at 00:52

## 2021-12-31 RX ADMIN — MORPHINE SULFATE 15 MG: 15 TABLET, FILM COATED, EXTENDED RELEASE ORAL at 08:39

## 2021-12-31 RX ADMIN — HYDROXYUREA 1000 MG: 500 CAPSULE ORAL at 08:39

## 2021-12-31 RX ADMIN — AZITHROMYCIN DIHYDRATE 250 MG: 250 TABLET, FILM COATED ORAL at 08:39

## 2021-12-31 RX ADMIN — DEXTROSE AND SODIUM CHLORIDE: 5; 450 INJECTION, SOLUTION INTRAVENOUS at 08:43

## 2021-12-31 RX ADMIN — OXYCODONE HYDROCHLORIDE 20 MG: 15 TABLET ORAL at 11:49

## 2021-12-31 RX ADMIN — FOLIC ACID 2 MG: 1 TABLET ORAL at 08:39

## 2021-12-31 RX ADMIN — Medication 10 ML: at 09:19

## 2021-12-31 RX ADMIN — DOCUSATE SODIUM 100 MG: 100 CAPSULE, LIQUID FILLED ORAL at 08:39

## 2021-12-31 RX ADMIN — HYDROMORPHONE HYDROCHLORIDE 2 MG: 2 INJECTION, SOLUTION INTRAMUSCULAR; INTRAVENOUS; SUBCUTANEOUS at 09:19

## 2021-12-31 RX ADMIN — HYDROMORPHONE HYDROCHLORIDE 2 MG: 2 INJECTION, SOLUTION INTRAMUSCULAR; INTRAVENOUS; SUBCUTANEOUS at 05:53

## 2021-12-31 RX ADMIN — OXYCODONE HYDROCHLORIDE 20 MG: 15 TABLET ORAL at 03:21

## 2021-12-31 ASSESSMENT — PAIN SCALES - GENERAL
PAINLEVEL_OUTOF10: 7

## 2021-12-31 NOTE — DISCHARGE SUMMARY
Hospital Medicine Discharge Summary    Patient ID: [facility]      Patient's PCP: Cony Schuler MD    Admit Date: 12/27/2021     Discharge Date: 12/31/2021      Admitting Provider: Melissa Tobar MD     Discharge Provider: Roney Varela MD     Discharge Diagnoses: Active Hospital Problems    Diagnosis     Sickle cell pain crisis ([address] [D57.00]     Asthma [J45.909]        The patient was seen and examined on day of discharge and this discharge summary is in conjunction with any daily progress note from day of discharge. Hospital Course:   Sickle cell pain crisis   Resolved with IVF/ pain med   -Pain control with OXY -IR  -Oncology consult appreciated , managing pain meds   -hydrea         Asthma-stable, resume inhalers     Leukocytosis-likely reactive, developed fever 101.1: blood cx/urine cx ngtd, CXR-unremarkable, Z pack initiated Monitor CBC  Resolved      Hx of DVT -on Eliquis, resumed          Physical Exam Performed:     BP (!) 119/57   Pulse 65   Temp 98.1 °F (36.7 °C) (Oral)   Resp 16   Ht 5' 8\" (1.727 m)   Wt 190 lb (86.2 kg)   SpO2 94%   BMI 28.89 kg/m²       General appearance:  No apparent distress, appears stated age and cooperative. HEENT:  Normal cephalic, atraumatic without obvious deformity. Pupils equal, round, and reactive to light. Extra ocular muscles intact. Conjunctivae/corneas clear. Neck: Supple, with full range of motion. No jugular venous distention. Trachea midline. Respiratory:  Normal respiratory effort. Clear to auscultation, bilaterally without Rales/Wheezes/Rhonchi. Cardiovascular:  Regular rate and rhythm with normal S1/S2 without murmurs, rubs or gallops. Abdomen: Soft, non-tender, non-distended with normal bowel sounds. Musculoskeletal:  No clubbing, cyanosis or edema bilaterally. Full range of motion without deformity. Skin: Skin color, texture, turgor normal.  No rashes or lesions.   Neurologic:  Neurovascularly intact without any focal sensory/motor deficits. Cranial nerves: II-XII intact, grossly non-focal.  Psychiatric:  Alert and oriented, thought content appropriate, normal insight  Capillary Refill: Brisk,< 3 seconds   Peripheral Pulses: +2 palpable, equal bilaterally       Labs: For convenience and continuity at follow-up the following most recent labs are provided:      CBC:    Lab Results   Component Value Date    WBC 12.5 12/31/2021    HGB 8.7 12/31/2021    HCT 24.1 12/31/2021     12/31/2021       Renal:    Lab Results   Component Value Date     12/31/2021    K 3.8 12/31/2021     12/31/2021    CO2 25 12/31/2021    BUN 8 12/31/2021    CREATININE 0.7 12/31/2021    CALCIUM 8.1 12/31/2021    PHOS 4.5 10/20/2021         Significant Diagnostic Studies    Radiology:   XR CHEST (2 VW)   Final Result   No acute process. Consults:     IP CONSULT TO HOSPITALIST  IP CONSULT TO ONCOLOGY    Disposition: home      Condition at Discharge: Stable    Discharge Instructions/Follow-up:  Hematology/ PCP    Code Status:  Prior     Activity: activity as tolerated    Diet: regular diet      Discharge Medications:     Discharge Medication List as of 12/31/2021 12:09 PM           Details   apixaban (ELIQUIS) 5 MG TABS tablet Take 1 tablet by mouth 2 times daily, Disp-60 tablet, R-0Normal      azithromycin (ZITHROMAX) 250 MG tablet Take 1 tablet by mouth daily for 4 doses, Disp-4 tablet, R-0Normal      pantoprazole (PROTONIX) 40 MG tablet Take 1 tablet by mouth every morning (before breakfast), Disp-30 tablet, R-0Normal              Details   folic acid (FOLVITE) 1 MG tablet Take 2 tablets by mouth daily, Disp-30 tablet, R-3Normal      oxyCODONE HCl (OXY-IR) 10 MG immediate release tablet Take 10 mg by mouth every 4 hours as needed for Pain. Historical Med      albuterol sulfate HFA (PROAIR HFA) 108 (90 Base) MCG/ACT inhaler Albuterol HFA Inhaler 90 mcg/actuation  inhaled  2 puffs prn     ActiveHistorical Med levalbuterol (XOPENEX) 0.31 MG/3ML nebulization Levalbuterol Nebulized    2 puffs prn     ActiveHistorical Med      docusate (COLACE, DULCOLAX) 100 MG CAPS Take 100 mg by mouthHistorical Med      ibuprofen (ADVIL;MOTRIN) 800 MG tablet ibuprofen 800 MG Oral Tablet  oral   prn    ActiveHistorical Med      hydroxyurea (HYDREA) 500 MG chemo capsule Take 1,000 mg by mouth daily Historical Med             Time Spent on discharge is   30 minutes in the examination, evaluation, counseling and review of medications and discharge plan. Signed:    Aisha Molina MD   1/4/2022      Thank you Enid Low MD for the opportunity to be involved in this patient's care. If you have any questions or concerns please feel free to contact me at [phone].

## 2021-12-31 NOTE — PROGRESS NOTES
Hospitalist Progress Note      PCP: Caroline Eid MD    Date of Admission: 12/27/2021    Chief Complaint: back pain    Hospital Course: 24 y.o. male who presented to [facility] with above complaints  Patient with PMH of sickle cell disease presented to the ED today with complaints of low back pain radiating down into both his legs. He reports he has had this pain for the past few days has been progressively getting worse not responsive to oral oxycodone that he has at home which he has been taking. He reports the pain is similar to his prior sickle cell pain crisis. He was last admitted to [facility] back in October 2021 with pain crisis. He denied any cough, shortness of breath, fevers or chills. Denies any chest pain.        Subjective: c/o feels slightly better today, updated on plan of care. Medications:  Reviewed    Infusion Medications    sodium chloride      dextrose 5 % and 0.45 % NaCl 125 mL/hr at 12/31/21 1042     Scheduled Medications    sodium chloride flush  5-40 mL IntraVENous BID    azithromycin  250 mg Oral Daily    docusate sodium  100 mg Oral Daily    hydroxyurea  1,000 mg Oral Daily    folic acid  2 mg Oral Daily    morphine  15 mg Oral 2 times per day    apixaban  5 mg Oral BID    pantoprazole  40 mg Oral QAM AC     PRN Meds: sodium chloride, albuterol sulfate HFA, HYDROmorphone, oxyCODONE, acetaminophen, ketorolac      Intake/Output Summary (Last 24 hours) at 12/31/2021 0948  Last data filed at 12/31/2021 0845  Gross per 24 hour   Intake 883.75 ml   Output 1550 ml   Net -666.25 ml       Physical Exam Performed:    BP (!) 119/57   Pulse 65   Temp 98.1 °F (36.7 °C) (Oral)   Resp 16   Ht 5' 8\" (1.727 m)   Wt 190 lb (86.2 kg)   SpO2 94%   BMI 28.89 kg/m²     General appearance: No apparent distress, appears stated age and cooperative. HEENT: Pupils equal, round, and reactive to light. Conjunctivae/corneas clear.   Neck: Supple, with full range of motion. No jugular venous distention. Trachea midline. Respiratory:  Normal respiratory effort. Clear to auscultation, bilaterally without Rales/Wheezes/Rhonchi. Cardiovascular: Regular rate and rhythm with normal S1/S2 without murmurs, rubs or gallops. Abdomen: Soft, non-tender, non-distended with normal bowel sounds. Musculoskeletal: No clubbing, cyanosis or edema bilaterally. Full range of motion without deformity. Skin: Skin color, texture, turgor normal.  No rashes or lesions. Neurologic:  Neurovascularly intact without any focal sensory/motor deficits. Cranial nerves: II-XII intact, grossly non-focal.  Psychiatric: Alert and oriented, thought content appropriate, normal insight  Capillary Refill: Brisk,3 seconds, normal   Peripheral Pulses: +2 palpable, equal bilaterally       Labs:   Recent Labs     12/29/21  0639 12/30/21  0838 12/31/21  0815   WBC 10.7 11.2* 12.5*   HGB 7.6* 7.5* 8.7*   HCT 21.1* 20.8* 24.1*    318 328     Recent Labs     12/29/21  0638 12/30/21  0838 12/31/21  0815    138 139   K 3.7 3.7 3.8    103 105   CO2 24 25 25   BUN 7 10 8   CREATININE 0.7* 0.7* 0.7*   CALCIUM 8.8 8.3 8.1*     Recent Labs     12/29/21  0638   AST 26   ALT 14   BILITOT 5.2*   ALKPHOS 95     No results for input(s): INR in the last 72 hours. No results for input(s): Cassidy Davis in the last 72 hours. Urinalysis:      Lab Results   Component Value Date    NITRU Negative 12/28/2021    WBCUA None seen 12/28/2021    BACTERIA Rare 06/09/2021    RBCUA None seen 12/28/2021    BLOODU Negative 12/28/2021    SPECGRAV 1.015 12/28/2021    GLUCOSEU Negative 12/28/2021       Radiology:  XR CHEST (2 VW)   Final Result   No acute process. Assessment/Plan:    Active Hospital Problems    Diagnosis     [facility] [D57.00]     Asthma [J45.909]      Sickle cell pain crisis   Characteristic pain crisis.   Reticulocyte count elevated 13.63%->11.93  No evidence of acute chest syndrome.  -Start IV fluids with D5 half-normal saline  -Pain control with MS Contin twice daily scheduled, IV Dilaudid 2 mg every 3 hours as needed for breakthrough pain  -Oncology consult  -Monitor hemoglobin, currently 8.4 no need for transfusion  -Oxy IR   -hydrea  -PRBC transfusion per Hem/Onc     Asthma-stable, resume inhalers     Leukocytosis-likely reactive, developed fever 101.1: blood cx/urine cx ngtd, CXR-unremarkable, Z pack initiated Monitor CBC     Hx of DVT -on Eliquis, resumed    DVT Prophylaxis: eliquis  Diet: ADULT DIET;  Regular  Code Status: Full Code    PT/OT Eval Status: ambulatory    Dispo - pending clinical improvement, possibly tomorrow 12/31    Vy Salomon MD

## 2021-12-31 NOTE — PROGRESS NOTES
Pt requesting IV Dilaudid anytime you enter room and calling for it often. Pt requesting it to be given with MS Contin.

## 2021-12-31 NOTE — PROGRESS NOTES
ONCOLOGY HEMATOLOGY CARE PROGRESS NOTE      SUBJECTIVE:     Transfused PRBC yesterday. Afebrile. Hgb 8.7. He hopes to go home today. ROS:     Constitutional:  No weight loss, No fever, No chills, No night sweats. Energy level good. Eyes:  No impairment or change in vision  ENT / Mouth:  No pain, abnormal ulceration, bleeding, nasal drip or change in voice or hearing  Cardiovascular:  No chest pain, palpitations, new edema, or calf discomfort  Respiratory:  No pain, hemoptysis, change to breathing  Breast:  No pain, discharge, change in appearance or texture  Gastrointestinal:  No pain, cramping, jaundice, change to eating and bowel habits  Urinary:  No pain, bleeding or change in continence  Genitalia: No pain, bleeding or discharge  Musculoskeletal:  No redness, pain, edema or weakness  Skin:  No pruritus, rash, change to nodules or lesions  Neurologic:  No discomfort, change in mental status, speech, sensory or motor activity  Psychiatric:  No change in concentration or change to affect or mood  Endocrine:  No hot flashes, increased thirst, or change to urine production  Hematologic: No petechiae, ecchymosis or bleeding  Lymphatic:  No lymphadenopathy or lymphedema  Allergy / Immunologic:  No eczema, hives, frequent or recurrent infections    OBJECTIVE        Physical    VITALS:  BP (!) 119/57   Pulse 65   Temp 98.1 °F (36.7 °C) (Oral)   Resp 16   Ht 5' 8\" (1.727 m)   Wt 190 lb (86.2 kg)   SpO2 94%   BMI 28.89 kg/m²   TEMPERATURE:  Current - Temp: 98.1 °F (36.7 °C);  Max - Temp  Av.1 °F (36.7 °C)  Min: 97.4 °F (36.3 °C)  Max: 98.6 °F (37 °C)  PULSE OXIMETRY RANGE: SpO2  Av.4 %  Min: 93 %  Max: 94 %  24HR INTAKE/OUTPUT:      Intake/Output Summary (Last 24 hours) at 2021 0916  Last data filed at 2021 0845  Gross per 24 hour   Intake 883.75 ml   Output 1550 ml   Net -666.25 ml       CONSTITUTIONAL: awake, alert, cooperative, no apparent twice daily during the hospitalization only  -added Toradol on a limited basis since he is on Eliquis  -as needed Dilaudid    Anemia:  -s/p 2 units PRBC Hgb S negative blood 12/30/21    History of a DVT:  -He is on Eliquis    Fever  - TMAX 101.1 this admit   - started [facility] 12-29 - will need to complete course on day of dc - Day 3/5   - blood cx negative   - urine cx pending   - chest xray negative as well.    - afebrile now      ONCOLOGIC DISPOSITION:    -OK for d/c from Heme/Onc standpoint; Dr. Granger Sender sent Oxy IR script in yesterday    Michelle Larkin MD

## 2021-12-31 NOTE — PROGRESS NOTES
DC instructions given, pt verbalized understanding. Pt aware RX sent to his pharmacy. Pt aware to f/u with PCP. IV removed no complications. Lockbox emptied. Pt belongings gathered. Pt ambulatory at [place]. Work note given to pt.

## 2022-01-02 LAB
BLOOD CULTURE, ROUTINE: NORMAL
CULTURE, BLOOD 2: NORMAL

## 2022-01-08 NOTE — PROGRESS NOTES
ONCOLOGY FOLLOW-UP:       Primary Oncologist:  Dr. 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 Transfused PRBC 12/2, 12/4. Marcela Ann Had fever x 1 yesterday, otherwise has been afebrile. COVID PCR negative  Remains on dilaudid PCA. He states he is feeling better today. REVIEW OF SYSTEMS:       10 point ROS completed. Pertinent positives in HPI, otherwise negative.      PHYSICAL EXAM:       Vitals:    12/07/20 0713   BP: (!) 101/55   Pulse: 100   Resp: 20   Temp: 98.9 °F (37.2 °C)   SpO2: 96%       General appearance: alert and cooperative  Head: Normocephalic, without obvious abnormality, atraumatic  Neck: No palpable lymphadenopathy in supraclavicular or cervical chains  Lungs: Clear to auscultation bilaterally, no audible rales, wheezes or crackles  Heart: Regular rate and rhythm, S1, S2 normal  Abdomen: Soft, non-tender; bowel sounds normal; no masses,  no organomegaly  Extremities: without cyanosis, clubbing, edema or asymmetry  Skin: No jaundice, purpura or petechiae      LABS:     Lab Results   Component Value Date    WBC 10.0 12/07/2020    HGB 7.0 (L) 12/07/2020    HCT 20.6 (LL) 12/07/2020    MCV 96.3 12/07/2020     (H) 12/07/2020       Lab Results   Component Value Date    GLUCOSE 103 (H) 12/07/2020    BUN 8 12/07/2020    CREATININE 0.6 (L) 12/07/2020    K 4.3 12/07/2020       Lab Results   Component Value Date    ALKPHOS 129 12/05/2020    ALT 34 12/05/2020    AST 44 (H) 12/05/2020    BILITOT 4.2 (H) 12/05/2020    BILIDIR 2.4 (H) 12/05/2020    PROT Please let patient know that symptoms are likely related to right-sided hernia repair previously.  He does have a history of prostatitis and enlargement of the prostate can cause urge to urinate with weaker stream.  Prior ultrasound did demonstrate a left-sided varicocele which is varicose veins in the scrotum.  These can flareup from time to time and give discomfort in the testicle which is the likely cause of his discomfort.  Treatment for this includes good scrotal support and as needed anti-inflammatories such as ibuprofen or Aleve when pain is present.  No need to update ultrasound at this time    Electronically signed by: Andre Goddard PA-C  1/8/2022     6.8 12/05/2020       IMAGING:     Xr Chest Portable  Result Date: 12/1/2020  1. No radiographic evidence of lung consolidation. 2. Mild cardiomegaly. CTPA 12/3/2020  No large central filling defect in the pulmonary arteries however evaluation    of lobar and subsegmental pulmonary arteries is nondiagnostic due to poor    contrast bolus.         Parenchymal changes as above concerning for infection. ASSESSMENT:     Problem List Items Addressed This Visit     Sickle cell pain crisis ([address] - Primary                PLAN:     1.  Sickle Cell Pain Crisis  - pain in chest and upper back different than before   - low grade fever x 1  - continue IVF and IV analgesics for acute crisis   - switched to dilaudid PCA given minimal improvement in symptoms - will start 0.5 mg/ht basal rate with 0.2 prn q 20 min  - continue IV  toradol  - continue hydrea/folate  - transfused 1 unit PRBC with Hgb < 7 12/2, 12/4 - Hgb 7.0 this AM - will give 1 unit today  - day 5 levaquin for low grade fevers - blood cx NGTD, infectious changes on chest CT, COVID PCR negative  - monitor daily CBC and hemolytic parameters  - CTPA showed no PE  - will continue bronchodilators  - pulmonary assistance appreciated  - pain improved today    Patrick Muñiz MD

## 2022-01-09 ENCOUNTER — APPOINTMENT (OUTPATIENT)
Dept: CT IMAGING | Age: 22
End: 2022-01-09
Payer: COMMERCIAL

## 2022-01-09 ENCOUNTER — HOSPITAL ENCOUNTER (EMERGENCY)
Age: 22
Discharge: HOME | End: 2022-01-10
Attending: EMERGENCY MEDICINE
Payer: COMMERCIAL

## 2022-01-09 ENCOUNTER — APPOINTMENT (OUTPATIENT)
Dept: GENERAL RADIOLOGY | Age: 22
End: 2022-01-09
Payer: COMMERCIAL

## 2022-01-09 DIAGNOSIS — T45.516A: ICD-10-CM

## 2022-01-09 DIAGNOSIS — Z91.128: ICD-10-CM

## 2022-01-09 DIAGNOSIS — R79.89 ELEVATED D-DIMER: ICD-10-CM

## 2022-01-09 DIAGNOSIS — M79.605 LEFT LEG PAIN: Primary | ICD-10-CM

## 2022-01-09 LAB
A/G RATIO: 1.4 (ref 1.1–2.2)
ALBUMIN SERPL-MCNC: 4.4 G/DL (ref 3.4–5)
ALP BLD-CCNC: 88 U/L (ref 40–129)
ALT SERPL-CCNC: 13 U/L (ref 10–40)
ANION GAP SERPL CALCULATED.3IONS-SCNC: 7 MMOL/L (ref 3–16)
ANTI-XA UNFRAC HEPARIN: 0.08 IU/ML (ref 0.3–0.7)
AST SERPL-CCNC: 24 U/L (ref 15–37)
BASOPHILS ABSOLUTE: 0.2 K/UL (ref 0–0.2)
BASOPHILS RELATIVE PERCENT: 1 %
BILIRUB SERPL-MCNC: 4.1 MG/DL (ref 0–1)
BUN BLDV-MCNC: 9 MG/DL (ref 7–20)
CALCIUM SERPL-MCNC: 9.3 MG/DL (ref 8.3–10.6)
CHLORIDE BLD-SCNC: 106 MMOL/L (ref 99–110)
CO2: 23 MMOL/L (ref 21–32)
CREAT SERPL-MCNC: 0.7 MG/DL (ref 0.9–1.3)
D DIMER: 402 NG/ML DDU (ref 0–229)
EOSINOPHILS ABSOLUTE: 0.1 K/UL (ref 0–0.6)
EOSINOPHILS RELATIVE PERCENT: 0.3 %
GFR AFRICAN AMERICAN: >60
GFR NON-AFRICAN AMERICAN: >60
GLUCOSE BLD-MCNC: 99 MG/DL (ref 70–99)
HCT VFR BLD CALC: 23.2 % (ref 40.5–52.5)
HEMOGLOBIN: 8.3 G/DL (ref 13.5–17.5)
LYMPHOCYTES ABSOLUTE: 2.1 K/UL (ref 1–5.1)
LYMPHOCYTES RELATIVE PERCENT: 10.2 %
MCH RBC QN AUTO: 32.7 PG (ref 26–34)
MCHC RBC AUTO-ENTMCNC: 35.6 G/DL (ref 31–36)
MCV RBC AUTO: 91.8 FL (ref 80–100)
MONOCYTES ABSOLUTE: 1.8 K/UL (ref 0–1.3)
MONOCYTES RELATIVE PERCENT: 9 %
NEUTROPHILS ABSOLUTE: 16.3 K/UL (ref 1.7–7.7)
NEUTROPHILS RELATIVE PERCENT: 79.5 %
PDW BLD-RTO: 22.1 % (ref 12.4–15.4)
PLATELET # BLD: 578 K/UL (ref 135–450)
PMV BLD AUTO: 8 FL (ref 5–10.5)
POTASSIUM SERPL-SCNC: 4.1 MMOL/L (ref 3.5–5.1)
RBC # BLD: 2.53 M/UL (ref 4.2–5.9)
SARS-COV-2, NAAT: NOT DETECTED
SODIUM BLD-SCNC: 136 MMOL/L (ref 136–145)
TOTAL PROTEIN: 7.5 G/DL (ref 6.4–8.2)
TROPONIN: <0.01 NG/ML
WBC # BLD: 20.4 K/UL (ref 4–11)

## 2022-01-09 PROCEDURE — 87635 SARS-COV-2 COVID-19 AMP PRB: CPT

## 2022-01-09 PROCEDURE — 85379 FIBRIN DEGRADATION QUANT: CPT

## 2022-01-09 PROCEDURE — 85025 COMPLETE CBC W/AUTO DIFF WBC: CPT

## 2022-01-09 PROCEDURE — 6360000002 HC RX W HCPCS: Performed by: EMERGENCY MEDICINE

## 2022-01-09 PROCEDURE — 85520 HEPARIN ASSAY: CPT

## 2022-01-09 PROCEDURE — 6360000004 HC RX CONTRAST MEDICATION: Performed by: EMERGENCY MEDICINE

## 2022-01-09 PROCEDURE — 93005 ELECTROCARDIOGRAM TRACING: CPT | Performed by: EMERGENCY MEDICINE

## 2022-01-09 PROCEDURE — 2580000003 HC RX 258: Performed by: EMERGENCY MEDICINE

## 2022-01-09 PROCEDURE — 96375 TX/PRO/DX INJ NEW DRUG ADDON: CPT

## 2022-01-09 PROCEDURE — 71045 X-RAY EXAM CHEST 1 VIEW: CPT

## 2022-01-09 PROCEDURE — 71260 CT THORAX DX C+: CPT

## 2022-01-09 PROCEDURE — 96374 THER/PROPH/DIAG INJ IV PUSH: CPT

## 2022-01-09 PROCEDURE — 99285 EMERGENCY DEPT VISIT HI MDM: CPT

## 2022-01-09 PROCEDURE — 96376 TX/PRO/DX INJ SAME DRUG ADON: CPT

## 2022-01-09 PROCEDURE — 80053 COMPREHEN METABOLIC PANEL: CPT

## 2022-01-09 PROCEDURE — 84484 ASSAY OF TROPONIN QUANT: CPT

## 2022-01-09 RX ORDER — KETOROLAC TROMETHAMINE 30 MG/ML
15 INJECTION, SOLUTION INTRAMUSCULAR; INTRAVENOUS ONCE
Status: COMPLETED | OUTPATIENT
Start: 2022-01-09 | End: 2022-01-09

## 2022-01-09 RX ORDER — 0.9 % SODIUM CHLORIDE 0.9 %
1000 INTRAVENOUS SOLUTION INTRAVENOUS ONCE
Status: COMPLETED | OUTPATIENT
Start: 2022-01-09 | End: 2022-01-09

## 2022-01-09 RX ADMIN — HYDROMORPHONE HYDROCHLORIDE 1 MG: 1 INJECTION, SOLUTION INTRAMUSCULAR; INTRAVENOUS; SUBCUTANEOUS at 23:33

## 2022-01-09 RX ADMIN — KETOROLAC TROMETHAMINE 15 MG: 30 INJECTION, SOLUTION INTRAMUSCULAR at 23:34

## 2022-01-09 RX ADMIN — SODIUM CHLORIDE 1000 ML: 9 INJECTION, SOLUTION INTRAVENOUS at 21:41

## 2022-01-09 RX ADMIN — HYDROMORPHONE HYDROCHLORIDE 1 MG: 1 INJECTION, SOLUTION INTRAMUSCULAR; INTRAVENOUS; SUBCUTANEOUS at 21:42

## 2022-01-09 RX ADMIN — IOPAMIDOL 75 ML: 755 INJECTION, SOLUTION INTRAVENOUS at 23:04

## 2022-01-09 ASSESSMENT — PAIN DESCRIPTION - ORIENTATION
ORIENTATION: LEFT
ORIENTATION: LEFT

## 2022-01-09 ASSESSMENT — PAIN SCALES - GENERAL
PAINLEVEL_OUTOF10: 9

## 2022-01-09 ASSESSMENT — PAIN DESCRIPTION - LOCATION
LOCATION: KNEE
LOCATION: LEG

## 2022-01-09 ASSESSMENT — PAIN DESCRIPTION - PAIN TYPE
TYPE: ACUTE PAIN
TYPE: CHRONIC PAIN;ACUTE PAIN

## 2022-01-09 ASSESSMENT — PAIN DESCRIPTION - DESCRIPTORS: DESCRIPTORS: SHARP;ACHING

## 2022-01-09 ASSESSMENT — PAIN DESCRIPTION - FREQUENCY: FREQUENCY: CONTINUOUS

## 2022-01-10 VITALS
OXYGEN SATURATION: 96 % | HEART RATE: 82 BPM | SYSTOLIC BLOOD PRESSURE: 120 MMHG | TEMPERATURE: 99 F | WEIGHT: 190 LBS | RESPIRATION RATE: 18 BRPM | DIASTOLIC BLOOD PRESSURE: 58 MMHG | BODY MASS INDEX: 28.79 KG/M2 | HEIGHT: 68 IN

## 2022-01-10 PROCEDURE — 6370000000 HC RX 637 (ALT 250 FOR IP): Performed by: EMERGENCY MEDICINE

## 2022-01-10 RX ORDER — OXYCODONE HYDROCHLORIDE 5 MG/1
10 TABLET ORAL ONCE
Status: COMPLETED | OUTPATIENT
Start: 2022-01-10 | End: 2022-01-10

## 2022-01-10 RX ADMIN — OXYCODONE 10 MG: 5 TABLET ORAL at 00:54

## 2022-01-10 ASSESSMENT — PAIN SCALES - GENERAL
PAINLEVEL_OUTOF10: 10
PAINLEVEL_OUTOF10: 10

## 2022-01-10 ASSESSMENT — PAIN DESCRIPTION - PAIN TYPE: TYPE: CHRONIC PAIN;ACUTE PAIN

## 2022-01-10 NOTE — ED PROVIDER NOTES
10:10 PM: I discussed the history, physical examination, laboratory and imaging studies, and treatment plan with Dr. Lorenzo Leggett. Yesenia Chris was signed out to me in stable condition. Please see Dr. Clint Ziegler documentation for details of their history, physical, and laboratory studies. Upon re-examination, a summary of Javier Javed's history, physical examination, and studies are as follows:    LLE sickle pain     On repeat evaluation patient states he continues to have pain. For this reason I given Toradol and Dilaudid again. After this he stated the pain was improved but not resolved and I gave him a dose of oxycodone 10 mg which is his home medicine. I also confirmed that he has this medicine at home. With a negative CT PE I feel the patient can be safely discharged. I advised him to follow-up for outpatient ultrasound to rule out DVT and to take his anticoagulant as prescribed or discuss that treatment regimen with his hematologist.    The total Critical Care time is 40 minutes which excludes separately billable procedures. The critical care was concerning treatment of sickle cell crisis with pain medications and IV fluid boluses. This time is exclusive of any time documented by any other providers. XR CHEST PORTABLE  Negative chest.     CT CHEST PULMONARY EMBOLISM W CONTRAST  Unable to evaluate the more distal segmental and subsegmental pulmonary arterial branches due to poor contrast bolus timing. No evidence of central pulmonary embolism. No acute pulmonary abnormality.      Results for orders placed or performed during the hospital encounter of 01/09/22   COVID-19, Rapid    Specimen: Nasopharyngeal Swab   Result Value Ref Range    SARS-CoV-2, NAAT Not Detected Not Detected   CBC Auto Differential   Result Value Ref Range    WBC 20.4 (H) 4.0 - 11.0 K/uL    RBC 2.53 (L) 4.20 - 5.90 M/uL    Hemoglobin 8.3 (L) 13.5 - 17.5 g/dL    Hematocrit 23.2 (L) 40.5 - 52.5 %    MCV 91.8 80.0 - 100.0 fL    MCH 32.7 26.0 - 34.0 pg    MCHC 35.6 31.0 - 36.0 g/dL    RDW 22.1 (H) 12.4 - 15.4 %    Platelets 130 (H) 001 - 450 K/uL    MPV 8.0 5.0 - 10.5 fL    Neutrophils % 79.5 %    Lymphocytes % 10.2 %    Monocytes % 9.0 %    Eosinophils % 0.3 %    Basophils % 1.0 %    Neutrophils Absolute 16.3 (H) 1.7 - 7.7 K/uL    Lymphocytes Absolute 2.1 1.0 - 5.1 K/uL    Monocytes Absolute 1.8 (H) 0.0 - 1.3 K/uL    Eosinophils Absolute 0.1 0.0 - 0.6 K/uL    Basophils Absolute 0.2 0.0 - 0.2 K/uL   Comprehensive Metabolic Panel   Result Value Ref Range    Sodium 136 136 - 145 mmol/L    Potassium 4.1 3.5 - 5.1 mmol/L    Chloride 106 99 - 110 mmol/L    CO2 23 21 - 32 mmol/L    Anion Gap 7 3 - 16    Glucose 99 70 - 99 mg/dL    BUN 9 7 - 20 mg/dL    CREATININE 0.7 (L) 0.9 - 1.3 mg/dL    GFR Non-African American >60 >60    GFR African American >60 >60    Calcium 9.3 8.3 - 10.6 mg/dL    Total Protein 7.5 6.4 - 8.2 g/dL    Albumin 4.4 3.4 - 5.0 g/dL    Albumin/Globulin Ratio 1.4 1.1 - 2.2    Total Bilirubin 4.1 (H) 0.0 - 1.0 mg/dL    Alkaline Phosphatase 88 40 - 129 U/L    ALT 13 10 - 40 U/L    AST 24 15 - 37 U/L   D-Dimer, Quantitative   Result Value Ref Range    D-Dimer, Quant 402 (H) 0 - 229 ng/mL DDU   Troponin   Result Value Ref Range    Troponin <0.01 <0.01 ng/mL   Anti-Xa Unfract Heparin   Result Value Ref Range    Anti-XA Unfrac Heparin 0.08 (L) 0.30 - 0.70 IU/mL   EKG 12 Lead   Result Value Ref Range    Ventricular Rate 89 BPM    Atrial Rate 89 BPM    P-R Interval 150 ms    QRS Duration 98 ms    Q-T Interval 364 ms    QTc Calculation (Bazett) 442 ms    P Axis 47 degrees    R Axis 62 degrees    T Axis 24 degrees    Diagnosis       Normal sinus rhythmModerate voltage criteria for LVH, may be normal variantNonspecific T wave abnormalityAbnormal ECGWhen compared with ECG of 03-MAY-2021 01:01,No significant change was found       I estimate there is LOW risk for ACUTE CORONARY SYNDROME, CHRONIC OBSTRUCTIVE PULMONARY DISEASE, CONGESTIVE HEART FAILURE, PERICARDIAL TAMPONADE, PNEUMONIA, PNEUMOTHORAX, PULMONARY EMBOLISM, SEPSIS, and THORACIC DISSECTION,  thus I consider the discharge disposition reasonable. Krystian Ribera and I have discussed the diagnosis and risks, and we agree with discharging home to follow-up with their primary doctor. We also discussed returning to the Emergency Department immediately if new or worsening symptoms occur. We have discussed the symptoms which are most concerning (e.g., bloody sputum, fever, worsening pain or shortness of breath, vomiting) that necessitate immediate return. CLINICAL IMPRESSION:  1. Left leg pain    2. Elevated d-dimer    3.  Intentional underdosing of anticoagulant by patient for reason other than financial hardship, initial encounter        BP (!) 120/58   Pulse 82   Temp 99 °F (37.2 °C) (Oral)   Resp 18   Ht 5' 8\" (1.727 m)   Wt 190 lb (86.2 kg)   SpO2 96%   BMI 28.89 kg/m²          Manjinder Ford MD  01/10/22 0601

## 2022-01-10 NOTE — ED NOTES
Patient provided with discharge information along with followup information. Patient verbalized understanding of instructions. Patient PIV removed intact and per protocol. Patient ambulated to POV with steady gait, no deficits noted by this RN.          [place], [address] [place] [address]  01/10/22 [id number]

## 2022-01-10 NOTE — ED NOTES
Patient provided with discharge, followup and referral information for doppler of leg. Patient verbalized understanding. Patient informed Obdulia Bajwa MD that pain in leg is still intense. Obdulia Bajwa MD to give PO pain medication. Per MAR, oral pain meds cannot be given until 1h after IV dilaudid. This RN to wait until that time to administer PO meds.    Isabel Jackson RN  01/09/22 Edgardo Soto RN  01/10/22 [id number]

## 2022-01-10 NOTE — ED PROVIDER NOTES
[address] [facility]  ED      CHIEF COMPLAINT  Leg Pain (patient with \"sickle cell\" pain in my left leg since Thursday/Friday.)       HISTORY OF PRESENT ILLNESS  Maria Esther Sim is a 24 y.o. male with history of sickle cell anemia, DVT on Eliquis who presents to the emergency department for evaluation of left-sided knee pain. Patient reports he was diagnosed with DVT about a month or 2 ago. Reports that was on the right lower extremity. Says he stopped taking Eliquis after the pain went away. Reports feeling short of breath. He is also having some chest pain. Reports he feels like this is his cycle sickle cell crisis. Says the pain has been present for about 2 days. Initially started while he was working as a  2 days ago. No inciting injuries. Pain is over the left medial knee. Has full ROM at bilateral knees. No difficulty with ambulation. No numbness or tingling. No other complaints, modifying factors or associated symptoms. I have reviewed the following from the nursing documentation.     Past Medical History:   Diagnosis Date    Accidental overdose     Asthma     DVT (deep venous thrombosis) ([facility]) 10/19/2021    R leg    Enlarged heart     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)     Sickle cell anemia ([facility])      Past Surgical History:   Procedure Laterality Date    SPLENECTOMY       Family History   Problem Relation Age of Onset    Other Father         sarcoidosis     Social History     Socioeconomic History    Marital status: Single     Spouse name: Not on file    Number of children: 0    Years of education: Not on file    Highest education level: Not on file   Occupational History    Not on file   Tobacco Use    Smoking status: Never Smoker    Smokeless tobacco: Never Used   Vaping Use    Vaping Use: Never used   Substance and Sexual Activity    Alcohol use: Not Currently    Drug use: Never    Sexual activity: Not Currently   Other Topics Concern    Not on file   Social History Narrative    Not on file     Social Determinants of Health     Financial Resource Strain:     Difficulty of Paying Living Expenses: Not on file   Food Insecurity:     Worried About Running Out of Food in the Last Year: Not on file    Laura of Food in the Last Year: Not on file   Transportation Needs:     Lack of Transportation (Medical): Not on file    Lack of Transportation (Non-Medical): Not on file   Physical Activity:     Days of Exercise per Week: Not on file    Minutes of Exercise per Session: Not on file   Stress:     Feeling of Stress : Not on file   Social Connections:     Frequency of Communication with Friends and Family: Not on file    Frequency of Social Gatherings with Friends and Family: Not on file    Attends Sabianism Services: Not on file    Active Member of [address] [place] [address] [employer] or Organizations: Not on file    Attends Club or Organization Meetings: Not on file    Marital Status: Not on file   Intimate Partner Violence:     Fear of Current or Ex-Partner: Not on file    Emotionally Abused: Not on file    Physically Abused: Not on file    Sexually Abused: Not on file   Housing Stability:     Unable to Pay for Housing in the Last Year: Not on file    Number of [place] in the Last Year: Not on file    Unstable Housing in the Last Year: Not on file     No current facility-administered medications for this encounter. Current Outpatient Medications   Medication Sig Dispense Refill    apixaban (ELIQUIS) 5 MG TABS tablet Take 1 tablet by mouth 2 times daily 60 tablet 0    pantoprazole (PROTONIX) 40 MG tablet Take 1 tablet by mouth every morning (before breakfast) 30 tablet 0    folic acid (FOLVITE) 1 MG tablet Take 2 tablets by mouth daily 30 tablet 3    oxyCODONE HCl (OXY-IR) 10 MG immediate release tablet Take 10 mg by mouth every 4 hours as needed for Pain.       albuterol sulfate HFA (PROAIR HFA) 108 (90 Base) MCG/ACT inhaler Albuterol HFA Inhaler 90 mcg/actuation  inhaled  2 puffs prn     Active      levalbuterol (XOPENEX) 0.31 MG/3ML nebulization Levalbuterol Nebulized    2 puffs prn     Active      docusate (COLACE, DULCOLAX) 100 MG CAPS Take 100 mg by mouth      ibuprofen (ADVIL;MOTRIN) 800 MG tablet ibuprofen 800 MG Oral Tablet  oral   prn    Active      hydroxyurea (HYDREA) 500 MG chemo capsule Take 1,000 mg by mouth daily        Allergies   Allergen Reactions    Morphine Shortness Of Breath     Other reaction(s): Histamine-like Reaction  Has asthma exacerbation with morphine-histamine type reaction         REVIEW OF SYSTEMS  10 systems reviewed, pertinent positives per HPI otherwise noted to be negative. PHYSICAL EXAM  BP (!) 115/53   Pulse 90   Temp 99 °F (37.2 °C) (Oral)   Resp 18   Ht 5' 8\" (1.727 m)   Wt 190 lb (86.2 kg)   SpO2 96%   BMI 28.89 kg/m²    GENERAL APPEARANCE: Awake and alert. HENT: Normocephalic. Atraumatic. PERRL. EOMI. No facial droop. HEART/CHEST: RRR. LUNGS: Respirations unlabored. Speaking comfortably in full sentences. ABDOMEN: Soft, non-distended abdomen. Non tender to palpation. No guarding. No rebound. EXTREMITIES: No gross deformities. No erythema or warmth over the left knee. Full ROM at the bilateral hip and knees. 2+ PT pulses bilaterally. Sensation intact all extremities. No lower extremity edema and negative Homans' sign bilaterally. SKIN: Warm and dry. No acute rashes. NEUROLOGICAL: Alert and oriented. No gross facial drooping. Answering questions appropriately. Moving all extremities. PSYCHIATRIC: Pleasant. Normal mood and affect.     LABS  Results for orders placed or performed during the hospital encounter of 01/09/22   CBC Auto Differential   Result Value Ref Range    WBC 20.4 (H) 4.0 - 11.0 K/uL    RBC 2.53 (L) 4.20 - 5.90 M/uL    Hemoglobin 8.3 (L) 13.5 - 17.5 g/dL    Hematocrit 23.2 (L) 40.5 - 52.5 %    MCV 91.8 80.0 - 100.0 fL    MCH 32.7 26.0 - 34.0 pg    MCHC 35.6 31.0 - 36.0 g/dL RDW 22.1 (H) 12.4 - 15.4 %    Platelets 738 (H) 170 - 450 K/uL    MPV 8.0 5.0 - 10.5 fL    Neutrophils % 79.5 %    Lymphocytes % 10.2 %    Monocytes % 9.0 %    Eosinophils % 0.3 %    Basophils % 1.0 %    Neutrophils Absolute 16.3 (H) 1.7 - 7.7 K/uL    Lymphocytes Absolute 2.1 1.0 - 5.1 K/uL    Monocytes Absolute 1.8 (H) 0.0 - 1.3 K/uL    Eosinophils Absolute 0.1 0.0 - 0.6 K/uL    Basophils Absolute 0.2 0.0 - 0.2 K/uL   D-Dimer, Quantitative   Result Value Ref Range    D-Dimer, Quant 402 (H) 0 - 229 ng/mL DDU   Anti-Xa Unfract Heparin   Result Value Ref Range    Anti-XA Unfrac Heparin 0.08 (L) 0.30 - 0.70 IU/mL   EKG 12 Lead   Result Value Ref Range    Ventricular Rate 89 BPM    Atrial Rate 89 BPM    P-R Interval 150 ms    QRS Duration 98 ms    Q-T Interval 364 ms    QTc Calculation (Bazett) 442 ms    P Axis 47 degrees    R Axis 62 degrees    T Axis 24 degrees    Diagnosis       Normal sinus rhythmModerate voltage criteria for LVH, may be normal variantNonspecific T wave abnormalityAbnormal ECGWhen compared with ECG of 03-MAY-2021 01:01,No significant change was found       I have reviewed all labs for this visit. ECG  The Ekg interpreted by me shows  Normal sinus rhythm with a rate of 89  Axis is normal  QTc is acceptable  Moderate voltage criteria for LVH. May be normal variant. ST Segments: Nonspecific T wave abnormality    RADIOLOGY  XR CHEST (2 VW)    Result Date: 12/29/2021  EXAMINATION: TWO XRAY VIEWS OF THE CHEST 12/29/2021 9:34 am COMPARISON: 10/17/2021 HISTORY: ORDERING SYSTEM PROVIDED HISTORY: fever, eval pna, eval for infarcts, sickle cell TECHNOLOGIST PROVIDED HISTORY: Reason for exam:->fever, eval pna, eval for infarcts, sickle cell Reason for Exam: fever, eval pna, eval for infarcts, sickle cell FINDINGS: The lungs are without acute focal process. There is no effusion or pneumothorax. The cardiomediastinal silhouette is stable. The osseous structures are stable. No acute process.      XR CHEST PORTABLE    Result Date: 1/9/2022  EXAMINATION: ONE XRAY VIEW OF THE CHEST 1/9/2022 8:45 pm COMPARISON: 12/29/2021 HISTORY: ORDERING SYSTEM PROVIDED HISTORY: other Reason for Exam: sickle cell pain FINDINGS: The lungs are without acute focal process. No effusion or pneumothorax. The cardiomediastinal silhouette is stable. The osseous structures are intact without acute process. Negative chest.     ED COURSE/MDM  Patient seen and evaluated. At presentation, patient was awake alert, afebrile, hemodynamically stable, and satting well on room air. Patient did not have any gross deformities on examination. He has full ROM. No erythema or warmth to make me suspect septic arthritis. Differential diagnosis includes DVT, PE, COVID-19, sickle cell crisis, or others. Will obtain labs, give him fluids, pain control, and reassess. Discussed that duplex is unavailable at this time. As patient reports having cp and soa, d dimer was obtained to assess for PE.  D-dimer was elevated at 4 2. Anti-Xa is subtherapeutic at 0.08. Given this, will obtain CT PE. This was ordered. Pending scan, patient care handed off to oncoming physician,  GRABIEL [facility] at shift change. Please refer to his note for the remainder of his ED stay. Pt was seen during the [place] 19 pandemic. Appropriate PPE worn by ME during patient encounters. Pt seen during a time with constrained hospital bed capacity and other potential inpatient and outpatient resources were constrained due to the viral pandemic. During the patient's ED course, the patient was given:  Medications   HYDROmorphone (DILAUDID) injection 1 mg (1 mg IntraVENous Given 1/9/22 2142)   0.9 % sodium chloride bolus (1,000 mLs IntraVENous New Bag 1/9/22 2141)        CLINICAL IMPRESSION  1. Left leg pain    2. Elevated d-dimer        Blood pressure (!) 115/53, pulse 90, temperature 99 °F (37.2 °C), temperature source Oral, resp.  rate 18, height 5' 8\" (1.727 m), weight 190 lb (86.2 kg), SpO2 96 %. DISPOSITION  Javier Javed - pending       Patient was given scripts for the following medications. I counseled patient how to take these medications. New Prescriptions    No medications on file       Follow-up with:  No follow-up provider specified. DISCLAIMER: This chart was created using Dragon dictation software. Efforts were made by me to ensure accuracy, however some errors may be present due to limitations of this technology and occasionally words are not transcribed correctly.        Flores Doll MD  01/09/22 1976

## 2022-01-11 LAB
EKG ATRIAL RATE: 89 BPM
EKG DIAGNOSIS: NORMAL
EKG P AXIS: 47 DEGREES
EKG P-R INTERVAL: 150 MS
EKG Q-T INTERVAL: 364 MS
EKG QRS DURATION: 98 MS
EKG QTC CALCULATION (BAZETT): 442 MS
EKG R AXIS: 62 DEGREES
EKG T AXIS: 24 DEGREES
EKG VENTRICULAR RATE: 89 BPM

## 2022-01-11 PROCEDURE — 93010 ELECTROCARDIOGRAM REPORT: CPT | Performed by: INTERNAL MEDICINE

## 2022-01-16 ENCOUNTER — HOSPITAL ENCOUNTER (EMERGENCY)
Age: 22
Discharge: HOME | End: 2022-01-16
Payer: COMMERCIAL

## 2022-01-16 ENCOUNTER — APPOINTMENT (OUTPATIENT)
Dept: GENERAL RADIOLOGY | Age: 22
End: 2022-01-16
Payer: COMMERCIAL

## 2022-01-16 VITALS
TEMPERATURE: 99.4 F | SYSTOLIC BLOOD PRESSURE: 104 MMHG | HEART RATE: 81 BPM | RESPIRATION RATE: 18 BRPM | WEIGHT: 190 LBS | BODY MASS INDEX: 28.79 KG/M2 | DIASTOLIC BLOOD PRESSURE: 57 MMHG | HEIGHT: 68 IN | OXYGEN SATURATION: 95 %

## 2022-01-16 DIAGNOSIS — Z79.01 ANTICOAGULATED: ICD-10-CM

## 2022-01-16 DIAGNOSIS — D57.00 SICKLE CELL DISEASE WITH CRISIS (HCC): Primary | ICD-10-CM

## 2022-01-16 DIAGNOSIS — R07.89 CHEST WALL PAIN: ICD-10-CM

## 2022-01-16 LAB
A/G RATIO: 1.4 (ref 1.1–2.2)
ALBUMIN SERPL-MCNC: 4.1 G/DL (ref 3.4–5)
ALP BLD-CCNC: 87 U/L (ref 40–129)
ALT SERPL-CCNC: 10 U/L (ref 10–40)
ANION GAP SERPL CALCULATED.3IONS-SCNC: 9 MMOL/L (ref 3–16)
AST SERPL-CCNC: 18 U/L (ref 15–37)
BASOPHILS ABSOLUTE: 0.1 K/UL (ref 0–0.2)
BASOPHILS RELATIVE PERCENT: 0.5 %
BILIRUB SERPL-MCNC: 4.6 MG/DL (ref 0–1)
BUN BLDV-MCNC: 4 MG/DL (ref 7–20)
CALCIUM SERPL-MCNC: 9 MG/DL (ref 8.3–10.6)
CHLORIDE BLD-SCNC: 101 MMOL/L (ref 99–110)
CO2: 25 MMOL/L (ref 21–32)
CREAT SERPL-MCNC: 0.7 MG/DL (ref 0.9–1.3)
EKG ATRIAL RATE: 94 BPM
EKG DIAGNOSIS: NORMAL
EKG P AXIS: 44 DEGREES
EKG P-R INTERVAL: 152 MS
EKG Q-T INTERVAL: 342 MS
EKG QRS DURATION: 100 MS
EKG QTC CALCULATION (BAZETT): 427 MS
EKG R AXIS: 60 DEGREES
EKG T AXIS: -23 DEGREES
EKG VENTRICULAR RATE: 94 BPM
EOSINOPHILS ABSOLUTE: 0 K/UL (ref 0–0.6)
EOSINOPHILS RELATIVE PERCENT: 0.1 %
GFR AFRICAN AMERICAN: >60
GFR NON-AFRICAN AMERICAN: >60
GLUCOSE BLD-MCNC: 95 MG/DL (ref 70–99)
HCT VFR BLD CALC: 21.5 % (ref 40.5–52.5)
HEMATOLOGY PATH CONSULT: NO
HEMOGLOBIN: 7.3 G/DL (ref 13.5–17.5)
LYMPHOCYTES ABSOLUTE: 1.9 K/UL (ref 1–5.1)
LYMPHOCYTES RELATIVE PERCENT: 10.1 %
MAGNESIUM: 2.1 MG/DL (ref 1.8–2.4)
MCH RBC QN AUTO: 33.2 PG (ref 26–34)
MCHC RBC AUTO-ENTMCNC: 34 G/DL (ref 31–36)
MCV RBC AUTO: 97.6 FL (ref 80–100)
MONOCYTES ABSOLUTE: 2.1 K/UL (ref 0–1.3)
MONOCYTES RELATIVE PERCENT: 11.3 %
NEUTROPHILS ABSOLUTE: 14.5 K/UL (ref 1.7–7.7)
NEUTROPHILS RELATIVE PERCENT: 78 %
PDW BLD-RTO: 26.5 % (ref 12.4–15.4)
PLATELET # BLD: 618 K/UL (ref 135–450)
PMV BLD AUTO: 7.5 FL (ref 5–10.5)
POTASSIUM REFLEX MAGNESIUM: 3.4 MMOL/L (ref 3.5–5.1)
RBC # BLD: 2.2 M/UL (ref 4.2–5.9)
SODIUM BLD-SCNC: 135 MMOL/L (ref 136–145)
TOTAL PROTEIN: 7 G/DL (ref 6.4–8.2)
TROPONIN: <0.01 NG/ML
WBC # BLD: 18.5 K/UL (ref 4–11)

## 2022-01-16 PROCEDURE — 6370000000 HC RX 637 (ALT 250 FOR IP): Performed by: NURSE PRACTITIONER

## 2022-01-16 PROCEDURE — 84484 ASSAY OF TROPONIN QUANT: CPT

## 2022-01-16 PROCEDURE — 83735 ASSAY OF MAGNESIUM: CPT

## 2022-01-16 PROCEDURE — 93005 ELECTROCARDIOGRAM TRACING: CPT | Performed by: NURSE PRACTITIONER

## 2022-01-16 PROCEDURE — 85025 COMPLETE CBC W/AUTO DIFF WBC: CPT

## 2022-01-16 PROCEDURE — 80053 COMPREHEN METABOLIC PANEL: CPT

## 2022-01-16 PROCEDURE — 96374 THER/PROPH/DIAG INJ IV PUSH: CPT

## 2022-01-16 PROCEDURE — 93010 ELECTROCARDIOGRAM REPORT: CPT | Performed by: INTERNAL MEDICINE

## 2022-01-16 PROCEDURE — 96376 TX/PRO/DX INJ SAME DRUG ADON: CPT

## 2022-01-16 PROCEDURE — 2580000003 HC RX 258: Performed by: NURSE PRACTITIONER

## 2022-01-16 PROCEDURE — 6360000002 HC RX W HCPCS: Performed by: NURSE PRACTITIONER

## 2022-01-16 PROCEDURE — 71045 X-RAY EXAM CHEST 1 VIEW: CPT

## 2022-01-16 PROCEDURE — 96375 TX/PRO/DX INJ NEW DRUG ADDON: CPT

## 2022-01-16 PROCEDURE — 99285 EMERGENCY DEPT VISIT HI MDM: CPT

## 2022-01-16 RX ORDER — OXYCODONE HYDROCHLORIDE 5 MG/1
10 TABLET ORAL ONCE
Status: COMPLETED | OUTPATIENT
Start: 2022-01-16 | End: 2022-01-16

## 2022-01-16 RX ORDER — 0.9 % SODIUM CHLORIDE 0.9 %
1000 INTRAVENOUS SOLUTION INTRAVENOUS ONCE
Status: COMPLETED | OUTPATIENT
Start: 2022-01-16 | End: 2022-01-16

## 2022-01-16 RX ORDER — KETOROLAC TROMETHAMINE 30 MG/ML
15 INJECTION, SOLUTION INTRAMUSCULAR; INTRAVENOUS ONCE
Status: COMPLETED | OUTPATIENT
Start: 2022-01-16 | End: 2022-01-16

## 2022-01-16 RX ADMIN — SODIUM CHLORIDE 1000 ML: 9 INJECTION, SOLUTION INTRAVENOUS at 13:41

## 2022-01-16 RX ADMIN — OXYCODONE 10 MG: 5 TABLET ORAL at 16:22

## 2022-01-16 RX ADMIN — HYDROMORPHONE HYDROCHLORIDE 1 MG: 1 INJECTION, SOLUTION INTRAMUSCULAR; INTRAVENOUS; SUBCUTANEOUS at 13:41

## 2022-01-16 RX ADMIN — HYDROMORPHONE HYDROCHLORIDE 1 MG: 1 INJECTION, SOLUTION INTRAMUSCULAR; INTRAVENOUS; SUBCUTANEOUS at 15:40

## 2022-01-16 RX ADMIN — KETOROLAC TROMETHAMINE 15 MG: 30 INJECTION, SOLUTION INTRAMUSCULAR; INTRAVENOUS at 13:41

## 2022-01-16 ASSESSMENT — PAIN SCALES - GENERAL
PAINLEVEL_OUTOF10: 8
PAINLEVEL_OUTOF10: 7
PAINLEVEL_OUTOF10: 7
PAINLEVEL_OUTOF10: 10

## 2022-01-16 ASSESSMENT — PAIN DESCRIPTION - LOCATION: LOCATION: ABDOMEN;CHEST;BACK

## 2022-01-16 ASSESSMENT — PAIN DESCRIPTION - PAIN TYPE: TYPE: ACUTE PAIN

## 2022-01-16 ASSESSMENT — PAIN DESCRIPTION - FREQUENCY: FREQUENCY: CONTINUOUS

## 2022-01-16 NOTE — ED NOTES
Bed: 19  Expected date:   Expected time:   Means of arrival:   Comments:  [facility] [address], RN  01/16/22 0206

## 2022-01-16 NOTE — ED PROVIDER NOTES
Evaluated by Advanced Practice Provider    [address] [facility]  ED      CHIEF COMPLAINT  Chest Pain (patient with chest pain, back pain and abdominal pain for the last 2 days. States fever with chest pain. Believes \"sickle cell\" episode) and Back Pain    HISTORY OF PRESENT ILLNESS  Yseenia Chris is a 24 y.o. male who presents to the ED complaining of chest, back pain, history of sickle cell. Sickle cell pain in his back, having CP and sharp pain in his right rib cage. States it is preventing him from breathing. Typically has sickle cell pain in his back. His current pain in his back is where he usually has it. Does not usually have pain in his chest with the sickle cell crisis. Pain in his chest is sharp on the left side, not as bad as the pain on the right side. CP since yesterday or even the day before. Pain has been constant, has not gone away since it started, progressively getting worse. Back pain started at the same time as the CP. States he is not really having abdominal pain. Reports a fever, at first it was 100.3, then went up to 101.2, this morning it was 100.4. Denies cough. Denies body aches. Denies nausea, vomiting, diarrhea. Denies sore throat. Denies recent ill contacts, specifically no flu or COVID. He is currently on Eliquis. Sees Dr. Crispin Salgado. The patient is currently rating their pain as 10/10 and describes it as a aching type of pain. Treatments tried prior to arrival in the ED include: home oxycodone last night. The patient arrived to the ED via EMS transport.     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       albuterol sulfate HFA (PROAIR HFA) 108 (90 Base) MCG/ACT inhaler Albuterol HFA Inhaler 90 mcg/actuation  inhaled  2 puffs prn     Active      levalbuterol (XOPENEX) 0.31 MG/3ML nebulization Levalbuterol Nebulized    2 puffs prn     Active      docusate (COLACE, DULCOLAX) 100 MG CAPS Take 100 mg by mouth      ibuprofen (ADVIL;MOTRIN) 800 MG tablet ibuprofen 800 MG Oral Tablet  oral   prn    Active      hydroxyurea (HYDREA) 500 MG chemo capsule Take 1,000 mg by mouth daily          ALLERGIES    Allergies   Allergen Reactions    Morphine Shortness Of Breath     Other reaction(s): Histamine-like Reaction  Has asthma exacerbation with morphine-histamine type reaction         FAMILY HISTORY    Family History   Problem Relation Age of Onset    Other Father         sarcoidosis       SOCIAL HISTORY    Social History     Socioeconomic History    Marital status: Single     Spouse name: Not on file    Number of children: 0    Years of education: Not on file    Highest education level: Not on file   Occupational History    Not on file   Tobacco Use    Smoking status: Never Smoker    Smokeless tobacco: Never Used   Vaping Use    Vaping Use: Never used   Substance and Sexual Activity    Alcohol use: Not Currently    Drug use: Never    Sexual activity: Not Currently   Other Topics Concern    Not on file   Social History Narrative    Not on file     Social Determinants of Health     Financial Resource Strain:     Difficulty of Paying Living Expenses: Not on file   Food Insecurity:     Worried About Running Out of Food in the Last Year: Not on file    Ran Out of Food in the Last Year: Not on file   Transportation Needs:     Lack of Transportation (Medical): Not on file    Lack of Transportation (Non-Medical): Not on file   Physical Activity:     Days of Exercise per Week: Not on file    Minutes of Exercise per Session: Not on file   Stress:     Feeling of Stress : Not on file   Social Connections:     Frequency of Communication with Friends and Family: Not on file    Frequency of Social Gatherings with Friends and Family: Not on file    Attends Denominational Services: Not on file    Active Member of "[employer] Group or Organizations: Not on file    Attends Club or Organization Meetings: Not on file    Marital Status: Not on file   Intimate Partner Violence:     Fear of Current or Ex-Partner: Not on file    Emotionally Abused: Not on file    Physically Abused: Not on file    Sexually Abused: Not on file   Housing Stability:     Unable to Pay for Housing in the Last Year: Not on file    Number of [place] in the Last Year: Not on file    Unstable Housing in the Last Year: Not on file       REVIEW OF SYSTEMS    10 systems reviewed, pertinent positives per HPI otherwise noted to be negative    PHYSICAL EXAM  Vitals:    01/16/22 1630   BP: (!) 104/57   Pulse: 81   Resp: 18   Temp:    SpO2: 95%     GENERAL: Patient is well-developed, well-nourished. Awake and alert. Cooperative. Resting in bed. Moderately distressed due to current level of pain, not toxic in appearance. HEENT:  Normocephalic, atraumatic. Conjunctiva appear normal. Sclera is non-icteric. External ears are normal.    NECK: Supple with normal ROM. Trachea midline. LUNGS: Equal and symmetric chest rise. Breathing is unlabored. Speaking comfortably in full sentences. Lungs are clear bilaterally to auscultation. Without wheezing, rales, or rhonchi.  Tenderness to palpation of the chest, bilaterally, worse on the right side, anteriorly over the rib cage. No rash, no swelling, crepitus, step off, bony deformities. CADIOVASCULAR:  Regular rate and rhythm. Normal S1-S2 sounds. No murmurs, rubs, or gallops. Capillary refill is brisk in all 4 extremities. Bilateral lower extremities are equal in size, there is no swelling observed. There is no tenderness to palpation. There is no erythema observed or warmth palpated. GI: Soft, nontender, nondistended with positive bowel sounds. No rebound tenderness, guarding or any peritoneal signs. No masses or hepatosplenomegaly    MUSCULOSKELETAL:  No gross deformities or trauma noted. Moving all extremities equally and appropriately. Normal ROM. SKIN: Warm/dry. Skin is intact. No rashes/lesions noted. PSYCHIATRIC: Mood and affect appropriate. Speech is clear and articulate. NEUROLOGIC: Alert and oriented. No focal motor or sensory deficits. LABS  I have reviewed all labs for this visit.    Results for orders placed or performed during the hospital encounter of 01/16/22   CBC Auto Differential   Result Value Ref Range    WBC 18.5 (H) 4.0 - 11.0 K/uL    RBC 2.20 (L) 4.20 - 5.90 M/uL    Hemoglobin 7.3 (L) 13.5 - 17.5 g/dL    Hematocrit 21.5 (L) 40.5 - 52.5 %    MCV 97.6 80.0 - 100.0 fL    MCH 33.2 26.0 - 34.0 pg    MCHC 34.0 31.0 - 36.0 g/dL    RDW 26.5 (H) 12.4 - 15.4 %    Platelets 910 (H) 471 - 450 K/uL    MPV 7.5 5.0 - 10.5 fL    Path Consult No     Neutrophils % 78.0 %    Lymphocytes % 10.1 %    Monocytes % 11.3 %    Eosinophils % 0.1 %    Basophils % 0.5 %    Neutrophils Absolute 14.5 (H) 1.7 - 7.7 K/uL    Lymphocytes Absolute 1.9 1.0 - 5.1 K/uL    Monocytes Absolute 2.1 (H) 0.0 - 1.3 K/uL    Eosinophils Absolute 0.0 0.0 - 0.6 K/uL    Basophils Absolute 0.1 0.0 - 0.2 K/uL   Comprehensive Metabolic Panel w/ Reflex to MG   Result Value Ref Range    Sodium 135 (L) 136 - 145 mmol/L    Potassium reflex Magnesium 3.4 (L) 3.5 - 5.1 mmol/L    Chloride 101 99 - 110 mmol/L    CO2 25 21 - 32 mmol/L    Anion Gap 9 3 - 16 Glucose 95 70 - 99 mg/dL    BUN 4 (L) 7 - 20 mg/dL    CREATININE 0.7 (L) 0.9 - 1.3 mg/dL    GFR Non-African American >60 >60    GFR African American >60 >60    Calcium 9.0 8.3 - 10.6 mg/dL    Total Protein 7.0 6.4 - 8.2 g/dL    Albumin 4.1 3.4 - 5.0 g/dL    Albumin/Globulin Ratio 1.4 1.1 - 2.2    Total Bilirubin 4.6 (H) 0.0 - 1.0 mg/dL    Alkaline Phosphatase 87 40 - 129 U/L    ALT 10 10 - 40 U/L    AST 18 15 - 37 U/L   Troponin   Result Value Ref Range    Troponin <0.01 <0.01 ng/mL   Magnesium   Result Value Ref Range    Magnesium 2.10 1.80 - 2.40 mg/dL   EKG 12 Lead   Result Value Ref Range    Ventricular Rate 94 BPM    Atrial Rate 94 BPM    P-R Interval 152 ms    QRS Duration 100 ms    Q-T Interval 342 ms    QTc Calculation (Bazett) 427 ms    P Axis 44 degrees    R Axis 60 degrees    T Axis -23 degrees    Diagnosis       Normal sinus rhythmT wave abnormality, consider inferior ischemiaAbnormal ECGWhen compared with ECG of 09-JAN-2022 21:45,Inverted T waves have replaced nonspecific T wave abnormality in Inferior leadsConfirmed by Taya Coats MD ([id number]) on 1/16/2022 2:53:00 PM       RADIOLOGY    XR CHEST PORTABLE    Result Date: 1/16/2022  EXAMINATION: ONE XRAY VIEW OF THE CHEST 1/16/2022 1:30 pm COMPARISON: 01/09/2022 HISTORY: ORDERING SYSTEM PROVIDED HISTORY: Right side rib pain TECHNOLOGIST PROVIDED HISTORY: Reason for exam:->Right side rib pain Reason for Exam: chest pain FINDINGS: The cardiopericardial silhouette is mildly enlarged for portable exam but similar to the prior exam.  The pulmonary vascularity is within normal limits. There are no focal areas of airspace disease or pleural effusion. There is no pneumothorax. Stable cardiomegaly     ED COURSE/MDM  Patient seen and evaluated. Old records reviewed. Diagnostic testing reviewed and results discussed. I have evaluated this patient. My supervising physician was available for consultation.     Leatha Alonso presented to the ED today with above noted complaints. Arrival vital signs: Elevated temperature of 99.4. Hemodynamically stable. Well saturated on room air. Physical exam performed at 1329: Right anterior rib cage tenderness to palpation. No adventitious breath sounds on exam and no reproducible abdominal tenderness to palpation. Blood work: There is a leukocytosis present as WBC elevated at 18.5, this is actually down from 1/9 of 20.4. Absolute neutrophils elevated at 14.5, absolute monos elevated at 2.1. No further differential shift. There is an anemia present which has decreased from 1/9 when it was 8.3/23.2-7.3/21. 5. Platelets are elevated at 618. Potassium is low at 3.4, no significant electrolyte abnormalities. No evidence of acute kidney injury or transaminitis. Bilirubin is elevated at 4.6 but this does appear chronic. Troponin is negative. Magnesium level is normal at 2.1. EKG: shows normal sinus rhythm. No acute findings. Imaging: Chest x-ray was obtained and shows no acute findings. After 2 doses of IV Dilaudid, Toradol, 1 L of IV fluids patient is still reporting pain. Patient will be given his home oxycodone 10 mg. Consults: Consulted hematology oncology was placed as patient has had H&H down to 7.5 on his most recent admission, at that point he was given a blood transfusion, hemoglobin elevated to 8.7, then decreased to 8.3 and today down to 7.3. I spoke with [facility], on-call for patient's oncologist.  He advised that typically they will let patients with sickle cell goal less than 6 before transfusion. He did not feel that he needs transfusion right now. I also discussed his pain being different than his typical sickle cell pain as well as he was reporting a fever prior to coming in. I reviewed the diagnostic findings and the physical exam.  Dr. Patty Shen felt the patient could be discharged home with his usual home medications.     Medications given in the ED:   Medications   HYDROmorphone (DILAUDID) injection 1 mg (1 mg IntraVENous Given 1/16/22 1341)   ketorolac (TORADOL) injection 15 mg (15 mg IntraVENous Given 1/16/22 1341)   0.9 % sodium chloride bolus (0 mLs IntraVENous Stopped 1/16/22 1448)   HYDROmorphone (DILAUDID) injection 1 mg (1 mg IntraVENous Given 1/16/22 1540)   oxyCODONE (ROXICODONE) immediate release tablet 10 mg (10 mg Oral Given 1/16/22 1622)      I advised the patient of the above conversation. He will be discharged and advised that he should follow-up with Dr. Goodwin Brina office tomorrow. At this point I do not feel the patient requires further work up and it is reasonable to discharge the patient. Please refer to AVS for further details regarding discharge instructions. A discussion was had with the patient regarding diagnosis, diagnostic testing results, treatment/ plan of care, and follow up. All questions were answered. Patient will follow up as directed for further evaluation/treatment. The patient was given strict return precautions as we discussed symptoms that would necessitate return to the ED. Patient will return to ED for new/worsening symptoms. The patient verbalized their understanding and agreement with the above plan. I estimate there is LOW risk for ACUTE CORONARY SYNDROME, INTRACRANIAL HEMORRHAGE, MALIGNANT DYSRHYTHMIA or HYPERTENSION, PULMONARY EMBOLISM, SEPSIS, SUBARACHNOID HEMORRHAGE, SUBDURAL HEMATOMA, STROKE, or THORACIC AORTIC DISSECTION, thus I consider the discharge disposition reasonable. Nora Marsh and I have discussed the diagnosis and risks, and we agree with discharging home to follow-up with their primary doctor. We also discussed returning to the Emergency Department immediately if new or worsening symptoms occur. We have discussed the symptoms which are most concerning (e.g., bloody sputum, fever, worsening pain or shortness of breath, vomiting, weakness) that necessitate immediate return.      Patient was sent home with a prescription for below medication/s. I did [place] patient on appropriate use of these medication. New Prescriptions    No medications on file       CLINICAL IMPRESSION    1. Sickle cell disease with crisis ([address]    2. Chest wall pain    3. Anticoagulated           Discharge Vitals:  Blood pressure (!) 104/57, pulse 81, temperature 99.4 °F (37.4 °C), temperature source Oral, resp. rate 18, height 5' 8\" (1.727 m), weight 190 lb (86.2 kg), SpO2 95 %. FOLLOW UP  Mendy Moran, [address] [address]    Call in 1 day  For further evaluation    [facility]  ED  [facility] [address]  Go to   If symptoms worsen      DISPOSITION  Patient was discharged to home in good condition. Comment: Please note this report has been produced using speech recognition software and may contain errors related to that system including errors in grammar, punctuation, and spelling, as well as words and phrases that may be inappropriate. If there are any questions or concerns please feel free to contact the dictating provider for clarification.         CARMELITA Young - LYNNETTE  01/16/22 0351

## 2022-01-16 NOTE — ED PROVIDER NOTES
I did not personally evaluate this patient but I was asked to review the EKG. EKG  The Ekg interpreted by myself in the emergency department in the absence of a cardiologist.  normal sinus rhythm with a rate of 94  Axis is   Normal  QTc is  within an acceptable range  Intervals and Durations are unremarkable. No specific ST-T wave changes appreciated. There is nonsevere ST changes 2, 3, aVF, T wave version lead III, aVF, unchanged from prior  No evidence of acute ischemia.    No significant change from prior EKG dated January 9, 2022     Colin Upton MD  01/16/22 5223

## 2022-01-16 NOTE — ED NOTES
1624 - PS to Dr Cardiad Ellis at [facility] per consult  Re: Sickle cell pain, anemia  1626 - Dr Caridad Ellis called back to speak with NP Rios Duque  01/16/22 [id number]

## 2022-01-25 ENCOUNTER — HOSPITAL ENCOUNTER (EMERGENCY)
Age: 22
Discharge: HOME | End: 2022-01-25
Attending: EMERGENCY MEDICINE
Payer: COMMERCIAL

## 2022-01-25 VITALS
RESPIRATION RATE: 17 BRPM | WEIGHT: 190 LBS | BODY MASS INDEX: 28.89 KG/M2 | OXYGEN SATURATION: 98 % | DIASTOLIC BLOOD PRESSURE: 69 MMHG | SYSTOLIC BLOOD PRESSURE: 105 MMHG | TEMPERATURE: 98 F | HEART RATE: 71 BPM

## 2022-01-25 DIAGNOSIS — D57.00 SICKLE CELL DISEASE WITH CRISIS (HCC): Primary | ICD-10-CM

## 2022-01-25 LAB
A/G RATIO: 1.8 (ref 1.1–2.2)
ACANTHOCYTES: ABNORMAL
ALBUMIN SERPL-MCNC: 4.6 G/DL (ref 3.4–5)
ALP BLD-CCNC: 99 U/L (ref 40–129)
ALT SERPL-CCNC: 11 U/L (ref 10–40)
ANION GAP SERPL CALCULATED.3IONS-SCNC: 14 MMOL/L (ref 3–16)
ANISOCYTOSIS: ABNORMAL
AST SERPL-CCNC: 26 U/L (ref 15–37)
BASE EXCESS VENOUS: -0.2 MMOL/L (ref -3–3)
BASOPHILS ABSOLUTE: 0 K/UL (ref 0–0.2)
BASOPHILS RELATIVE PERCENT: 0 %
BILIRUB SERPL-MCNC: 3.1 MG/DL (ref 0–1)
BUN BLDV-MCNC: 7 MG/DL (ref 7–20)
CALCIUM SERPL-MCNC: 9.4 MG/DL (ref 8.3–10.6)
CARBOXYHEMOGLOBIN: 4.1 % (ref 0–1.5)
CHLORIDE BLD-SCNC: 102 MMOL/L (ref 99–110)
CO2: 21 MMOL/L (ref 21–32)
CREAT SERPL-MCNC: 0.7 MG/DL (ref 0.9–1.3)
EOSINOPHILS ABSOLUTE: 0.2 K/UL (ref 0–0.6)
EOSINOPHILS RELATIVE PERCENT: 1 %
GFR AFRICAN AMERICAN: >60
GFR NON-AFRICAN AMERICAN: >60
GLUCOSE BLD-MCNC: 114 MG/DL (ref 70–99)
HCO3 VENOUS: 23.8 MMOL/L (ref 23–29)
HCT VFR BLD CALC: 24 % (ref 40.5–52.5)
HEMATOLOGY PATH CONSULT: NO
HEMOGLOBIN: 8 G/DL (ref 13.5–17.5)
IMMATURE RETIC FRACT: 0.74 (ref 0.21–0.37)
LACTIC ACID, SEPSIS: 1.2 MMOL/L (ref 0.4–1.9)
LYMPHOCYTES ABSOLUTE: 3.7 K/UL (ref 1–5.1)
LYMPHOCYTES RELATIVE PERCENT: 24 %
MACROCYTES: ABNORMAL
MCH RBC QN AUTO: 32.4 PG (ref 26–34)
MCHC RBC AUTO-ENTMCNC: 33.5 G/DL (ref 31–36)
MCV RBC AUTO: 96.6 FL (ref 80–100)
METHEMOGLOBIN VENOUS: 0.6 %
MONOCYTES ABSOLUTE: 1.4 K/UL (ref 0–1.3)
MONOCYTES RELATIVE PERCENT: 9 %
NEUTROPHILS ABSOLUTE: 10.3 K/UL (ref 1.7–7.7)
NEUTROPHILS RELATIVE PERCENT: 66 %
NUCLEATED RED BLOOD CELLS: 2 /100 WBC
O2 SAT, VEN: 91 %
O2 THERAPY: ABNORMAL
OVALOCYTES: ABNORMAL
PCO2, VEN: 35.8 MMHG (ref 40–50)
PDW BLD-RTO: 22.7 % (ref 12.4–15.4)
PH VENOUS: 7.44 (ref 7.35–7.45)
PLATELET # BLD: 606 K/UL (ref 135–450)
PLATELET SLIDE REVIEW: ABNORMAL
PMV BLD AUTO: 7.4 FL (ref 5–10.5)
PO2, VEN: 66.2 MMHG (ref 25–40)
POIKILOCYTES: ABNORMAL
POLYCHROMASIA: ABNORMAL
POTASSIUM REFLEX MAGNESIUM: 4.2 MMOL/L (ref 3.5–5.1)
PRO-BNP: 11 PG/ML (ref 0–124)
RBC # BLD: 2.48 M/UL (ref 4.2–5.9)
REASON FOR REJECTION: NORMAL
REJECTED TEST: NORMAL
RETICULOCYTE ABSOLUTE COUNT: 0.34 M/UL
RETICULOCYTE COUNT PCT: 13.91 % (ref 0.5–2.18)
SARS-COV-2, NAAT: NOT DETECTED
SICKLE CELLS: ABNORMAL
SODIUM BLD-SCNC: 137 MMOL/L (ref 136–145)
TARGET CELLS: ABNORMAL
TCO2 CALC VENOUS: 25 MMOL/L
TOTAL PROTEIN: 7.2 G/DL (ref 6.4–8.2)
TROPONIN: <0.01 NG/ML
WBC # BLD: 15.6 K/UL (ref 4–11)

## 2022-01-25 PROCEDURE — 85045 AUTOMATED RETICULOCYTE COUNT: CPT

## 2022-01-25 PROCEDURE — 96365 THER/PROPH/DIAG IV INF INIT: CPT

## 2022-01-25 PROCEDURE — 84484 ASSAY OF TROPONIN QUANT: CPT

## 2022-01-25 PROCEDURE — 85025 COMPLETE CBC W/AUTO DIFF WBC: CPT

## 2022-01-25 PROCEDURE — 2580000003 HC RX 258: Performed by: EMERGENCY MEDICINE

## 2022-01-25 PROCEDURE — 83880 ASSAY OF NATRIURETIC PEPTIDE: CPT

## 2022-01-25 PROCEDURE — 96374 THER/PROPH/DIAG INJ IV PUSH: CPT

## 2022-01-25 PROCEDURE — 6360000002 HC RX W HCPCS: Performed by: EMERGENCY MEDICINE

## 2022-01-25 PROCEDURE — 82803 BLOOD GASES ANY COMBINATION: CPT

## 2022-01-25 PROCEDURE — 83605 ASSAY OF LACTIC ACID: CPT

## 2022-01-25 PROCEDURE — 87635 SARS-COV-2 COVID-19 AMP PRB: CPT

## 2022-01-25 PROCEDURE — 96372 THER/PROPH/DIAG INJ SC/IM: CPT

## 2022-01-25 PROCEDURE — 80053 COMPREHEN METABOLIC PANEL: CPT

## 2022-01-25 PROCEDURE — 99284 EMERGENCY DEPT VISIT MOD MDM: CPT

## 2022-01-25 PROCEDURE — 96375 TX/PRO/DX INJ NEW DRUG ADDON: CPT

## 2022-01-25 RX ORDER — KETOROLAC TROMETHAMINE 30 MG/ML
15 INJECTION, SOLUTION INTRAMUSCULAR; INTRAVENOUS ONCE
Status: COMPLETED | OUTPATIENT
Start: 2022-01-25 | End: 2022-01-25

## 2022-01-25 RX ORDER — SODIUM CHLORIDE, SODIUM LACTATE, POTASSIUM CHLORIDE, AND CALCIUM CHLORIDE .6; .31; .03; .02 G/100ML; G/100ML; G/100ML; G/100ML
30 INJECTION, SOLUTION INTRAVENOUS ONCE
Status: COMPLETED | OUTPATIENT
Start: 2022-01-25 | End: 2022-01-25

## 2022-01-25 RX ADMIN — KETOROLAC TROMETHAMINE 15 MG: 30 INJECTION, SOLUTION INTRAMUSCULAR at 03:14

## 2022-01-25 RX ADMIN — HYDROMORPHONE HYDROCHLORIDE 1 MG: 1 INJECTION, SOLUTION INTRAMUSCULAR; INTRAVENOUS; SUBCUTANEOUS at 04:33

## 2022-01-25 RX ADMIN — SODIUM CHLORIDE, POTASSIUM CHLORIDE, SODIUM LACTATE AND CALCIUM CHLORIDE 1000 ML: 600; 310; 30; 20 INJECTION, SOLUTION INTRAVENOUS at 03:15

## 2022-01-25 ASSESSMENT — PAIN SCALES - GENERAL
PAINLEVEL_OUTOF10: 2
PAINLEVEL_OUTOF10: 9

## 2022-01-25 ASSESSMENT — PAIN DESCRIPTION - PAIN TYPE: TYPE: CHRONIC PAIN

## 2022-01-25 NOTE — ED PROVIDER NOTES
[address] [facility]  ED  EMERGENCY DEPARTMENT ENCOUNTER        Pt Name: Coral Clayton  MRN: [id number]  [place] 2000  Date of evaluation: 1/25/2022  Provider: Jose Medina MD  PCP: Cyndie Rueda MD    This patient was seen and evaluated by the attending physician Jose Medina MD.      [address] [place] [address]       Chief Complaint   Patient presents with    Joint Pain     sickle cell, patient here on the 14th and states he has not followed up        HISTORY OF PRESENT ILLNESS   (Location/Symptom, Timing/Onset, Context/Setting, Quality, Duration, Modifying Factors, Severity)  Note limiting factors. Coral Clayton is a 24 y.o. male here today with a chief complaint of sickle cell pain crisis and concern for coronavirus. 58-year-old male, known sickle cell, on home oxycodone, woke up this morning with bad joint pain congestion chills malaise and his parents are concerned he may have coronavirus as they were exposed to recently themselves. No fevers chills sweats no nausea or diarrhea. Does not appear to have been vaccinated for coronavirus. Nursing Notes were all reviewed and agreed with or any disagreements were addressed  in the HPI. REVIEW OF SYSTEMS    (2-9 systems for level 4, 10 or more for level 5)     Review of Systems    Positives and Pertinent negatives as per HPI. Except as noted abovein the ROS, all other systems were reviewed and negative.        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)     Sickle cell anemia (HCC)          SURGICAL HISTORY     Past Surgical History:   Procedure Laterality Date    SPLENECTOMY           CURRENTMEDICATIONS       Discharge Medication List as of 1/25/2022  6:27 AM      CONTINUE these medications which have NOT CHANGED    Details apixaban (ELIQUIS) 5 MG TABS tablet Take 1 tablet by mouth 2 times daily, Disp-60 tablet, R-0Normal      pantoprazole (PROTONIX) 40 MG tablet Take 1 tablet by mouth every morning (before breakfast), Disp-30 tablet, [id number]      folic acid (FOLVITE) 1 MG tablet Take 2 tablets by mouth daily, Disp-30 tablet, R-3Normal      oxyCODONE HCl (OXY-IR) 10 MG immediate release tablet Take 10 mg by mouth every 4 hours as needed for Pain. Historical Med      albuterol sulfate HFA (PROAIR HFA) 108 (90 Base) MCG/ACT inhaler Albuterol HFA Inhaler 90 mcg/actuation  inhaled  2 puffs prn     ActiveHistorical Med      levalbuterol (XOPENEX) 0.31 MG/3ML nebulization Levalbuterol Nebulized    2 puffs prn     ActiveHistorical Med      docusate (COLACE, DULCOLAX) 100 MG CAPS Take 100 mg by mouthHistorical Med      ibuprofen (ADVIL;MOTRIN) 800 MG tablet ibuprofen 800 MG Oral Tablet  oral   prn    ActiveHistorical Med      hydroxyurea (HYDREA) 500 MG chemo capsule Take 1,000 mg by mouth daily Historical Med               ALLERGIES     Morphine    FAMILYHISTORY       Family History   Problem Relation Age of Onset    Other Father         sarcoidosis          SOCIAL HISTORY       Social History     Socioeconomic History    Marital status: Single     Spouse name: Not on file    Number of children: 0    Years of education: Not on file    Highest education level: Not on file   Occupational History    Not on file   Tobacco Use    Smoking status: Never Smoker    Smokeless tobacco: Never Used   Vaping Use    Vaping Use: Never used   Substance and Sexual Activity    Alcohol use: Not Currently    Drug use: Never    Sexual activity: Not Currently   Other Topics Concern    Not on file   Social History Narrative    Not on file     Social Determinants of Health     Financial Resource Strain:     Difficulty of Paying Living Expenses: Not on file   Food Insecurity:     Worried About Running Out of Food in the Last Year: Not on file   Ezequiel Amezcua Ran Out of Food in the Last Year: Not on file   Transportation Needs:     Lack of Transportation (Medical): Not on file    Lack of Transportation (Non-Medical): Not on file   Physical Activity:     Days of Exercise per Week: Not on file    Minutes of Exercise per Session: Not on file   Stress:     Feeling of Stress : Not on file   Social Connections:     Frequency of Communication with Friends and Family: Not on file    Frequency of Social Gatherings with Friends and Family: Not on file    Attends Hindu Services: Not on file    Active Member of [address] [place] [address] [employer] or Organizations: Not on file    Attends Club or Organization Meetings: Not on file    Marital Status: Not on file   Intimate Partner Violence:     Fear of Current or Ex-Partner: Not on file    Emotionally Abused: Not on file    Physically Abused: Not on file    Sexually Abused: Not on file   Housing Stability:     Unable to Pay for Housing in the Last Year: Not on file    Number of [place] in the Last Year: Not on file    Unstable Housing in the Last Year: Not on file       SCREENINGS             PHYSICAL EXAM    (up to 7 for level 4, 8 or more for level 5)     ED Triage Vitals   BP Temp Temp src Pulse Resp SpO2 Height Weight   -- -- -- -- -- -- -- --       Physical Exam    General Appearance:  Alert, cooperative, no distress, appears stated age. Head:  Normocephalic, without obvious abnormality, atraumatic. Eyes:  conjunctiva/corneas clear, EOM's intact. Sclera anicteric. ENT: Mucous membranes moist.   Neck: Supple, symmetrical, trachea midline, no adenopathy. No jugular venous distention. Lungs:   No Respiratory Distress. Chest Wall:  Atrauamtic   Heart:  RRR   Abdomen:   Soft, NT, ND   Extremities:  Full range of motion. Pulses: Symmetric x4   Skin:  No rashes or lesions to exposed skin. Neurologic: Alert and oriented X 3. Motor grossly normal.  Speech clear.           DIAGNOSTIC RESULTS   LABS:    Labs Reviewed COMPREHENSIVE METABOLIC PANEL W/ REFLEX TO MG FOR LOW K - Abnormal; Notable for the following components:       Result Value    Glucose 114 (*)     CREATININE 0.7 (*)     Total Bilirubin 3.1 (*)     All other components within normal limits    Narrative:     CALL  Shawn Rodriguez [phone],  Rejected Test Name/Called to: [facility] JOEL MESA RN, 01/25/2022 02:55, by  [facility]  Performed at:  [address],  [employer], [address]   Phone [phone]   BLOOD GAS, VENOUS - Abnormal; Notable for the following components:    pCO2, Raymon 35.8 (*)     pO2, Raymon 66.2 (*)     Carboxyhemoglobin 4.1 (*)     All other components within normal limits    Narrative:     Performed at:  [address],  [place], [address]   Phone [phone]   CBC WITH AUTO DIFFERENTIAL - Abnormal; Notable for the following components:    WBC 15.6 (*)     RBC 2.48 (*)     Hemoglobin 8.0 (*)     Hematocrit 24.0 (*)     RDW 22.7 (*)     Platelets 160 (*)     Neutrophils Absolute 10.3 (*)     Monocytes Absolute 1.4 (*)     nRBC 2 (*)     Anisocytosis 3+ (*)     Macrocytes 2+ (*)     Polychromasia 1+ (*)     Poikilocytes 3+ (*)     Acanthocytes Occasional (*)     Ovalocytes Occasional (*)     Target Cells Occasional (*)     Sickle Cells 1+ (*)     All other components within normal limits    Narrative:     Performed at:  [address],  [place], [address]   Phone [phone]   RETICULOCYTES - Abnormal; Notable for the following components:    Retic Ct Pct 13.91 (*)     Immature Retic Fract 0.74 (*)     All other components within normal limits    Narrative:     Performed at:  [address],  [place], [address]   Phone [phone], RAPID    Narrative:     Performed at:  [address],  [place], [place] Resp:   17   Temp: 98 °F (36.7 °C)  98 °F (36.7 °C)   TempSrc: Oral  Oral   SpO2: 99%  98%   Weight:  190 lb (86.2 kg)        Patient was given thefollowing medications:  Medications   lactated ringers bolus (0 mLs IntraVENous Stopped 1/25/22 0432)   ketorolac (TORADOL) injection 15 mg (15 mg IntraVENous Given 1/25/22 0314)   HYDROmorphone (DILAUDID) injection 1 mg (1 mg IntraMUSCular Given 1/25/22 0603)       21M, Sickle Cell disease, concern for COVID  In for labs, hydration, hydromorphone, and COVID testing. I placed an US guided line in usual fashion given poor peripheral access; US guided, 18ga, Left brachial.    Labs reviewed; stable from prior. Retic Count WNL  COVID negative. Improved with fluids  [facility]. FINAL IMPRESSION      1.  Sickle cell disease with crisis [facility])          DISPOSITION/PLAN   DISPOSITION        PATIENT REFERREDTO:  Willie Cruz MD  [address] [place] [address], [address] [place] [address]            DISCHARGE MEDICATIONS:  Discharge Medication List as of 1/25/2022  6:27 AM          DISCONTINUED MEDICATIONS:  Discharge Medication List as of 1/25/2022  6:27 AM                 (Please note that portions ofthis note were completed with a voice recognition program.  Efforts were made to edit the dictations but occasionally words are mis-transcribed.)    Soheila Navarro MD (electronically signed)           Soheila Navarro MD  01/27/22 4855

## 2022-01-30 ENCOUNTER — HOSPITAL ENCOUNTER (EMERGENCY)
Age: 22
Discharge: HOME | End: 2022-01-30
Attending: EMERGENCY MEDICINE
Payer: COMMERCIAL

## 2022-01-30 VITALS
SYSTOLIC BLOOD PRESSURE: 130 MMHG | OXYGEN SATURATION: 100 % | DIASTOLIC BLOOD PRESSURE: 116 MMHG | RESPIRATION RATE: 18 BRPM | HEART RATE: 107 BPM | TEMPERATURE: 98 F

## 2022-01-30 DIAGNOSIS — D57.00 SICKLE CELL CRISIS (HCC): Primary | ICD-10-CM

## 2022-01-30 PROCEDURE — 99283 EMERGENCY DEPT VISIT LOW MDM: CPT

## 2022-01-30 RX ORDER — KETOROLAC TROMETHAMINE 30 MG/ML
30 INJECTION, SOLUTION INTRAMUSCULAR; INTRAVENOUS ONCE
Status: DISCONTINUED | OUTPATIENT
Start: 2022-01-30 | End: 2022-01-30 | Stop reason: HOSPADM

## 2022-01-30 RX ORDER — SODIUM CHLORIDE 450 MG/100ML
1000 INJECTION, SOLUTION INTRAVENOUS CONTINUOUS
Status: DISCONTINUED | OUTPATIENT
Start: 2022-01-30 | End: 2022-01-30

## 2022-01-30 ASSESSMENT — PAIN DESCRIPTION - ORIENTATION: ORIENTATION: LOWER

## 2022-01-30 ASSESSMENT — ENCOUNTER SYMPTOMS
ABDOMINAL PAIN: 0
SHORTNESS OF BREATH: 0
RHINORRHEA: 0
VOMITING: 0
NAUSEA: 0
SORE THROAT: 0
EYE PAIN: 0

## 2022-01-30 ASSESSMENT — PAIN DESCRIPTION - PAIN TYPE: TYPE: ACUTE PAIN

## 2022-01-30 ASSESSMENT — PAIN DESCRIPTION - LOCATION: LOCATION: BACK

## 2022-01-30 ASSESSMENT — PAIN SCALES - GENERAL: PAINLEVEL_OUTOF10: 9

## 2022-01-31 NOTE — ED NOTES
Bed: 27  Expected date:   Expected time:   Means of arrival:   Comments:  Medic [facility] 2, RN  01/30/22 2558

## 2022-01-31 NOTE — ED PROVIDER NOTES
[address] [facility]      Pt Name: Gurpreet Orellana  MRN: [id number]  [place] 2000  Date of evaluation: 1/30/2022  Provider: Pita Velasquez MD    CHIEF COMPLAINT       Chief Complaint   Patient presents with    Back Pain     back pain began earlier this morning. pt reports history of sickle cell. denies injury. HISTORY OF PRESENT ILLNESS   (Location/Symptom, Timing/Onset,Context/Setting, Quality, Duration, Modifying Factors, Severity)  Note limiting factors. Gurpreet Orellana is a 24 y.o. male who presents to the emergency department for back pain. The patient states that he has a history of sickle cell disease. The location of his pain is typical of his sickle cell crisis. This episode is perhaps a little bit different and that it is sharper than usual otherwise no significant changes. Patient has been on oxycodone as maintenance medication. His last dose was this morning. He said he took a nap and woke up was still in pain so he came to the emergency room. He has had no fevers or chills he denies any weakness of his extremities he denies any numbness or tingling no gait abnormality. He has no incontinence of urine or stool and he denies any anesthesia in the genital region. Nursing notes were reviewed. REVIEW OF SYSTEMS    (2-9 systems for level 4, 10 or more for level 5)     Review of Systems   Constitutional: Negative for fever. HENT: Negative for rhinorrhea and sore throat. Eyes: Negative for pain. Respiratory: Negative for shortness of breath. Cardiovascular: Negative for chest pain. Gastrointestinal: Negative for abdominal pain, nausea and vomiting. Genitourinary: Negative for difficulty urinating. Neurological: Negative for headaches.          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)     Sickle cell anemia (HCC)          SURGICALHISTORY       Past Surgical History:   Procedure Laterality Date    SPLENECTOMY           CURRENT MEDICATIONS       Discharge Medication List as of 1/30/2022  9:09 PM      CONTINUE these medications which have NOT CHANGED    Details   apixaban (ELIQUIS) 5 MG TABS tablet Take 1 tablet by mouth 2 times daily, Disp-60 tablet, R-0Normal      pantoprazole (PROTONIX) 40 MG tablet Take 1 tablet by mouth every morning (before breakfast), Disp-30 tablet, [id number]      folic acid (FOLVITE) 1 MG tablet Take 2 tablets by mouth daily, Disp-30 tablet, R-3Normal      oxyCODONE HCl (OXY-IR) 10 MG immediate release tablet Take 10 mg by mouth every 4 hours as needed for Pain. Historical Med      albuterol sulfate HFA (PROAIR HFA) 108 (90 Base) MCG/ACT inhaler Albuterol HFA Inhaler 90 mcg/actuation  inhaled  2 puffs prn     ActiveHistorical Med      levalbuterol (XOPENEX) 0.31 MG/3ML nebulization Levalbuterol Nebulized    2 puffs prn     ActiveHistorical Med      docusate (COLACE, DULCOLAX) 100 MG CAPS Take 100 mg by mouthHistorical Med      ibuprofen (ADVIL;MOTRIN) 800 MG tablet ibuprofen 800 MG Oral Tablet  oral   prn    ActiveHistorical Med      hydroxyurea (HYDREA) 500 MG chemo capsule Take 1,000 mg by mouth daily Historical Med             ALLERGIES     Morphine    FAMILY HISTORY       Family History   Problem Relation Age of Onset    Other Father         sarcoidosis          SOCIAL HISTORY       Social History     Socioeconomic History    Marital status: Single     Spouse name: Not on file    Number of children: 0    Years of education: Not on file    Highest education level: Not on file   Occupational History    Not on file   Tobacco Use    Smoking status: Never Smoker    Smokeless tobacco: Never Used   Vaping Use    Vaping Use: Never used   Substance and Sexual Activity    Alcohol use: Not Currently    Drug use: Never    Sexual activity: Not Currently   Other Topics Concern    Not on file   Social History Narrative    Not on file     Social Determinants of Health     Financial Resource Strain:     Difficulty of Paying Living Expenses: Not on file   Food Insecurity:     Worried About Running Out of Food in the Last Year: Not on file    Laura of Food in the Last Year: Not on file   Transportation Needs:     Lack of Transportation (Medical): Not on file    Lack of Transportation (Non-Medical): Not on file   Physical Activity:     Days of Exercise per Week: Not on file    Minutes of Exercise per Session: Not on file   Stress:     Feeling of Stress : Not on file   Social Connections:     Frequency of Communication with Friends and Family: Not on file    Frequency of Social Gatherings with Friends and Family: Not on file    Attends Mandaeism Services: Not on file    Active Member of [address] [place] [address] [employer] or Organizations: Not on file    Attends Club or Organization Meetings: Not on file    Marital Status: Not on file   Intimate Partner Violence:     Fear of Current or Ex-Partner: Not on file    Emotionally Abused: Not on file    Physically Abused: Not on file    Sexually Abused: Not on file   Housing Stability:     Unable to Pay for Housing in the Last Year: Not on file    Number of [place] in the Last Year: Not on file    Unstable Housing in the Last Year: Not on file       SCREENINGS             PHYSICAL EXAM    (up to 7 for level 4, 8 or more for level 5)     ED Triage Vitals [01/30/22 1958]   BP Temp Temp Source Pulse Resp SpO2 Height Weight   (!) 130/116 98 °F (36.7 °C) Oral 107 18 100 % -- --       Physical Exam  Vitals and nursing note reviewed. Constitutional:       Appearance: Normal appearance. He is well-developed. He is not ill-appearing. HENT:      Head: Normocephalic and atraumatic.       Right Ear: External ear normal.      Left Ear: External ear normal.      Nose: Nose normal.   Eyes: General: Scleral icterus present. Right eye: No discharge. Left eye: No discharge. Conjunctiva/sclera: Conjunctivae normal.   Cardiovascular:      Rate and Rhythm: Normal rate and regular rhythm. Heart sounds: Normal heart sounds. Pulmonary:      Effort: Pulmonary effort is normal. No respiratory distress. Breath sounds: Normal breath sounds. No wheezing or rales. Abdominal:      General: Bowel sounds are normal. There is no distension. Palpations: Abdomen is soft. Tenderness: There is no abdominal tenderness. There is no guarding or rebound. Musculoskeletal:         General: No swelling, tenderness, deformity or signs of injury. Normal range of motion. Cervical back: Neck supple. Comments: Lumbar spine mild tenderness. He has normal range of motion of the spine. No acute deformity   Skin:     Coloration: Skin is not jaundiced or pale. Findings: No bruising, erythema, lesion or rash. Neurological:      Mental Status: He is alert.       Gait: Gait normal.      Comments: High-sensitivity neurologic exam of the bilateral lower extremity normal except for 1+ bilateral reflexes   Psychiatric:         Mood and Affect: Mood normal.         Behavior: Behavior normal.             DIAGNOSTIC RESULTS     EKG: All EKG's are interpreted by the Emergency Department Physician who either signs or Co-signs this chart in the absence of a cardiologist.    12 lead EKG shows     RADIOLOGY:   Non-plain film images such as CT, Ultrasound and MRI are read by the radiologist. Plain radiographic images are visualized and preliminarily interpreted by the emergency physician with the below findings:        Interpretation per the Radiologist below, if available at the time of this note:    No orders to display         ED BEDSIDE ULTRASOUND:   Performed by ED Physician - none    LABS:  Labs Reviewed   CBC WITH AUTO DIFFERENTIAL   COMPREHENSIVE METABOLIC PANEL W/ REFLEX TO MG FOR LOW K   URINE RT REFLEX TO CULTURE   LACTIC ACID, PLASMA   RETICULOCYTES       All other labs were within normal range or not returned as of this dictation. EMERGENCY DEPARTMENT COURSE and DIFFERENTIAL DIAGNOSIS/MDM:   Vitals:    Vitals:    01/30/22 1958   BP: (!) 130/116   Pulse: 107   Resp: 18   Temp: 98 °F (36.7 °C)   TempSrc: Oral   SpO2: 100%       Adult male who comes in with back pain. The patient has a history of sickle cell disease. His vital signs are reviewed he is tachycardic. Initial laboratory studies are ordered. Also ordered Toradol and IV fluids. I reviewed the patient's chart and recognized that he has had a very large amount of opioids prescribed in fact in less than 30 days the patient has had 240 oxycodone tablets prescribed. I do see in the past he has had an accidental overdose. The patient appears comfortable. I ordered Toradol for his pain. I have a discussion with the patient about his opioid prescriptions. I explained to him that I am concerned about the amount of opiates that he has been receiving and his wellbeing. I told him that tonight we will start off with nonopioid pain medication and we will assess him for any complications. I told him that we would be ordering laboratory studies to begin with. And based on our findings we would then decide what additional medications would be ordered. The patient's appears to be unhappy with this because after I left the room he told the nurse that he no longer wanted to receive care and he left. CRITICAL CARE TIME   None       CONSULTS:  None    PROCEDURES:       Procedures    FINAL IMPRESSION      1.  Sickle cell crisis [facility])          DISPOSITION/PLAN   DISPOSITION Decision To Discharge 01/30/2022 08:57:46 PM      PATIENT REFERREDTO:  Luz Pierce MD  [address] [facility] 204 5530    Schedule an appointment as soon as possible for a visit         DISCHARGEMEDICATIONS:  Discharge Medication List as of 1/30/2022  9:09 PM             (Please note that portions of this note were completed with a voice recognition program.  Efforts were made to edit the dictations but occasionally words are mis-transcribed.)    Doyle Quintanilla MD (electronically signed)  Attending Emergency Physician        Doyle Quintanilla MD  01/30/22 9602

## 2022-01-31 NOTE — ED NOTES
Registration entered pt room to get signature for treatment. Pt not in room nor In bathroom.   Pt vacated without alerting staff     Charlene Jin RN  01/30/22 8667

## 2022-02-14 ENCOUNTER — HOSPITAL ENCOUNTER (EMERGENCY)
Age: 22
Discharge: HOME | End: 2022-02-14
Attending: EMERGENCY MEDICINE
Payer: COMMERCIAL

## 2022-02-14 VITALS
TEMPERATURE: 98.7 F | WEIGHT: 190 LBS | HEIGHT: 68 IN | BODY MASS INDEX: 28.79 KG/M2 | OXYGEN SATURATION: 99 % | RESPIRATION RATE: 15 BRPM | HEART RATE: 89 BPM | SYSTOLIC BLOOD PRESSURE: 121 MMHG | DIASTOLIC BLOOD PRESSURE: 60 MMHG

## 2022-02-14 DIAGNOSIS — D57.00 SICKLE CELL PAIN CRISIS (HCC): Primary | ICD-10-CM

## 2022-02-14 LAB
A/G RATIO: 1.8 (ref 1.1–2.2)
ALBUMIN SERPL-MCNC: 4.5 G/DL (ref 3.4–5)
ALP BLD-CCNC: 96 U/L (ref 40–129)
ALT SERPL-CCNC: 10 U/L (ref 10–40)
ANION GAP SERPL CALCULATED.3IONS-SCNC: 11 MMOL/L (ref 3–16)
ANISOCYTOSIS: ABNORMAL
AST SERPL-CCNC: 24 U/L (ref 15–37)
ATYPICAL LYMPHOCYTE RELATIVE PERCENT: 1 % (ref 0–6)
BANDED NEUTROPHILS RELATIVE PERCENT: 1 % (ref 0–7)
BASOPHILIC STIPPLING: ABNORMAL
BASOPHILS ABSOLUTE: 0 K/UL (ref 0–0.2)
BASOPHILS RELATIVE PERCENT: 0 %
BILIRUB SERPL-MCNC: 4.7 MG/DL (ref 0–1)
BUN BLDV-MCNC: 13 MG/DL (ref 7–20)
CALCIUM SERPL-MCNC: 9.5 MG/DL (ref 8.3–10.6)
CHLORIDE BLD-SCNC: 104 MMOL/L (ref 99–110)
CO2: 25 MMOL/L (ref 21–32)
CREAT SERPL-MCNC: 0.8 MG/DL (ref 0.9–1.3)
EOSINOPHILS ABSOLUTE: 0.2 K/UL (ref 0–0.6)
EOSINOPHILS RELATIVE PERCENT: 1 %
GFR AFRICAN AMERICAN: >60
GFR NON-AFRICAN AMERICAN: >60
GLUCOSE BLD-MCNC: 92 MG/DL (ref 70–99)
HCT VFR BLD CALC: 21.9 % (ref 40.5–52.5)
HEMATOLOGY PATH CONSULT: NORMAL
HEMATOLOGY PATH CONSULT: YES
HEMOGLOBIN: 7.7 G/DL (ref 13.5–17.5)
IMMATURE RETIC FRACT: 0.6 (ref 0.21–0.37)
LYMPHOCYTES ABSOLUTE: 3.3 K/UL (ref 1–5.1)
LYMPHOCYTES RELATIVE PERCENT: 20 %
MACROCYTES: ABNORMAL
MCH RBC QN AUTO: 33.2 PG (ref 26–34)
MCHC RBC AUTO-ENTMCNC: 35.1 G/DL (ref 31–36)
MCV RBC AUTO: 94.6 FL (ref 80–100)
METAMYELOCYTES RELATIVE PERCENT: 1 %
MONOCYTES ABSOLUTE: 1.1 K/UL (ref 0–1.3)
MONOCYTES RELATIVE PERCENT: 7 %
NEUTROPHILS ABSOLUTE: 11.1 K/UL (ref 1.7–7.7)
NEUTROPHILS RELATIVE PERCENT: 69 %
NUCLEATED RED BLOOD CELLS: 1 /100 WBC
OVALOCYTES: ABNORMAL
PDW BLD-RTO: 24 % (ref 12.4–15.4)
PLATELET # BLD: 370 K/UL (ref 135–450)
PLATELET SLIDE REVIEW: ADEQUATE
PMV BLD AUTO: 8.3 FL (ref 5–10.5)
POIKILOCYTES: ABNORMAL
POLYCHROMASIA: ABNORMAL
POTASSIUM REFLEX MAGNESIUM: 4.4 MMOL/L (ref 3.5–5.1)
RBC # BLD: 2.32 M/UL (ref 4.2–5.9)
RETICULOCYTE ABSOLUTE COUNT: 0.25 M/UL
RETICULOCYTE COUNT PCT: 10.78 % (ref 0.5–2.18)
SICKLE CELLS: ABNORMAL
SLIDE REVIEW: ABNORMAL
SODIUM BLD-SCNC: 140 MMOL/L (ref 136–145)
STOMATOCYTES: ABNORMAL
TARGET CELLS: ABNORMAL
TOTAL PROTEIN: 7 G/DL (ref 6.4–8.2)
WBC # BLD: 15.6 K/UL (ref 4–11)

## 2022-02-14 PROCEDURE — 80053 COMPREHEN METABOLIC PANEL: CPT

## 2022-02-14 PROCEDURE — 96376 TX/PRO/DX INJ SAME DRUG ADON: CPT

## 2022-02-14 PROCEDURE — 6360000002 HC RX W HCPCS: Performed by: EMERGENCY MEDICINE

## 2022-02-14 PROCEDURE — 96375 TX/PRO/DX INJ NEW DRUG ADDON: CPT

## 2022-02-14 PROCEDURE — 2580000003 HC RX 258: Performed by: EMERGENCY MEDICINE

## 2022-02-14 PROCEDURE — 99285 EMERGENCY DEPT VISIT HI MDM: CPT

## 2022-02-14 PROCEDURE — 85045 AUTOMATED RETICULOCYTE COUNT: CPT

## 2022-02-14 PROCEDURE — 96374 THER/PROPH/DIAG INJ IV PUSH: CPT

## 2022-02-14 PROCEDURE — 85025 COMPLETE CBC W/AUTO DIFF WBC: CPT

## 2022-02-14 RX ORDER — SODIUM CHLORIDE, SODIUM LACTATE, POTASSIUM CHLORIDE, AND CALCIUM CHLORIDE .6; .31; .03; .02 G/100ML; G/100ML; G/100ML; G/100ML
1000 INJECTION, SOLUTION INTRAVENOUS ONCE
Status: COMPLETED | OUTPATIENT
Start: 2022-02-14 | End: 2022-02-14

## 2022-02-14 RX ORDER — KETOROLAC TROMETHAMINE 30 MG/ML
15 INJECTION, SOLUTION INTRAMUSCULAR; INTRAVENOUS ONCE
Status: COMPLETED | OUTPATIENT
Start: 2022-02-14 | End: 2022-02-14

## 2022-02-14 RX ADMIN — KETOROLAC TROMETHAMINE 15 MG: 30 INJECTION, SOLUTION INTRAMUSCULAR at 00:57

## 2022-02-14 RX ADMIN — HYDROMORPHONE HYDROCHLORIDE 1 MG: 1 INJECTION, SOLUTION INTRAMUSCULAR; INTRAVENOUS; SUBCUTANEOUS at 00:58

## 2022-02-14 RX ADMIN — SODIUM CHLORIDE, POTASSIUM CHLORIDE, SODIUM LACTATE AND CALCIUM CHLORIDE 1000 ML: 600; 310; 30; 20 INJECTION, SOLUTION INTRAVENOUS at 01:02

## 2022-02-14 RX ADMIN — HYDROMORPHONE HYDROCHLORIDE 1 MG: 1 INJECTION, SOLUTION INTRAMUSCULAR; INTRAVENOUS; SUBCUTANEOUS at 02:15

## 2022-02-14 ASSESSMENT — PAIN DESCRIPTION - ORIENTATION: ORIENTATION: RIGHT;LEFT

## 2022-02-14 ASSESSMENT — PAIN SCALES - GENERAL
PAINLEVEL_OUTOF10: 10
PAINLEVEL_OUTOF10: 9

## 2022-02-14 ASSESSMENT — PAIN DESCRIPTION - PAIN TYPE
TYPE: ACUTE PAIN
TYPE: CHRONIC PAIN

## 2022-02-14 ASSESSMENT — PAIN DESCRIPTION - LOCATION: LOCATION: KNEE

## 2022-02-14 NOTE — ED NOTES
Pt stating, \"I am still in pain\".  Neftaly Gonsalez MD notified     Camila Jyoti, [address] [facility]  02/14/22 9459

## 2022-02-14 NOTE — ED PROVIDER NOTES
CHIEF COMPLAINT  Knee Pain (pain in bilat knees and left elbow that started 2days ago, hx of sickle cell, 'my home meds aren't helping\")      Karina0 Sean Pierce is a 24 y.o. male with a history of sickle cell anemia presenting for evaluation of knee pain, elbow pain. Patient states that he has sickle cell disease. He states that he has had a flareup of his pain over the past several days. He takes oxycodone at home and this has not been helping. He states that the pain in his bilateral knees, elbows is consistent with prior sickle cell episodes. He denies any chest pain, difficulty breathing. No fever. No swelling in his legs.   Past Medical History:   Diagnosis Date    Accidental overdose     Asthma     DVT (deep venous thrombosis) ([facility]) 10/19/2021    R leg    Enlarged heart     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 ([facility])     Sickle cell anemia ([facility])      Past Surgical History:   Procedure Laterality Date    SPLENECTOMY       Family History   Problem Relation Age of Onset    Other Father         sarcoidosis     Social History     Socioeconomic History    Marital status: Single     Spouse name: Not on file    Number of children: 0    Years of education: Not on file    Highest education level: Not on file   Occupational History    Not on file   Tobacco Use    Smoking status: Never Smoker    Smokeless tobacco: Never Used   Vaping Use    Vaping Use: Never used   Substance and Sexual Activity    Alcohol use: Not Currently    Drug use: Never    Sexual activity: Not Currently   Other Topics Concern    Not on file   Social History Narrative    Not on file     Social Determinants of Health     Financial Resource Strain:     Difficulty of Paying Living Expenses: Not on file   Food Insecurity:     Worried About Running Out of Food in the Last Year: Not on file    Laura of Food in the Last Year: Not on file   Transportation Needs:     Lack of Transportation (Medical): Not on file    Lack of Transportation (Non-Medical): Not on file   Physical Activity:     Days of Exercise per Week: Not on file    Minutes of Exercise per Session: Not on file   Stress:     Feeling of Stress : Not on file   Social Connections:     Frequency of Communication with Friends and Family: Not on file    Frequency of Social Gatherings with Friends and Family: Not on file    Attends [employer] Services: Not on file    Active Member of [address] [place] [address] or Organizations: Not on file    Attends Club or Organization Meetings: Not on file    Marital Status: Not on file   Intimate Partner Violence:     Fear of Current or Ex-Partner: Not on file    Emotionally Abused: Not on file    Physically Abused: Not on file    Sexually Abused: Not on file   Housing Stability:     Unable to Pay for Housing in the Last Year: Not on file    Number of [place] in the Last Year: Not on file    Unstable Housing in the Last Year: Not on file     No current facility-administered medications for this encounter. Current Outpatient Medications   Medication Sig Dispense Refill    apixaban (ELIQUIS) 5 MG TABS tablet Take 1 tablet by mouth 2 times daily 60 tablet 0    pantoprazole (PROTONIX) 40 MG tablet Take 1 tablet by mouth every morning (before breakfast) 30 tablet 0    folic acid (FOLVITE) 1 MG tablet Take 2 tablets by mouth daily 30 tablet 3    oxyCODONE HCl (OXY-IR) 10 MG immediate release tablet Take 10 mg by mouth every 4 hours as needed for Pain.       albuterol sulfate HFA (PROAIR HFA) 108 (90 Base) MCG/ACT inhaler Albuterol HFA Inhaler 90 mcg/actuation  inhaled  2 puffs prn     Active      levalbuterol (XOPENEX) 0.31 MG/3ML nebulization Levalbuterol Nebulized    2 puffs prn     Active      docusate (COLACE, DULCOLAX) 100 MG CAPS Take 100 mg by mouth      ibuprofen (ADVIL;MOTRIN) 800 MG tablet ibuprofen 800 MG Oral Tablet  oral   prn    Active      hydroxyurea (HYDREA) 500 MG chemo capsule Take 1,000 mg by mouth daily        Allergies   Allergen Reactions    Morphine Shortness Of Breath     Other reaction(s): Histamine-like Reaction  Has asthma exacerbation with morphine-histamine type reaction         REVIEW OF SYSTEMS  Positive and pertinent negatives as per HPI. All other systems were reviewed and are negative. PHYSICAL EXAM  /60   Pulse 80   Temp 98.7 °F (37.1 °C) (Oral)   Resp 15   Ht 5' 8\" (1.727 m)   Wt 190 lb (86.2 kg)   SpO2 97%   BMI 28.89 kg/m²   GENERAL APPEARANCE: Awake and alert. Cooperative. HEAD: Normocephalic. Atraumatic. HEART: RRR. No harsh murmurs. Intact radial pulses 2+ bilaterally. LUNGS: Respirations unlabored without accessory muscle use. CTAB. Good air exchange. No wheezes, rales, or rhonchi. Speaking comfortably in full sentences. ABDOMEN: Soft. Non-distended. Non-tender. No guarding or rebound. EXTREMITIES: No peripheral edema. No acute deformities. SKIN: Warm and dry. No acute rashes. NEUROLOGICAL: Alert and oriented X 3. No focal deficits  LABS  I have reviewed all labs for this visit.    Results for orders placed or performed during the hospital encounter of 02/14/22   CBC Auto Differential   Result Value Ref Range    WBC 15.6 (H) 4.0 - 11.0 K/uL    RBC 2.32 (L) 4.20 - 5.90 M/uL    Hemoglobin 7.7 (L) 13.5 - 17.5 g/dL    Hematocrit 21.9 (L) 40.5 - 52.5 %    MCV 94.6 80.0 - 100.0 fL    MCH 33.2 26.0 - 34.0 pg    MCHC 35.1 31.0 - 36.0 g/dL    RDW 24.0 (H) 12.4 - 15.4 %    Platelets 596 640 - 228 K/uL    MPV 8.3 5.0 - 10.5 fL    PLATELET SLIDE REVIEW Adequate     SLIDE REVIEW see below     Path Consult Yes     Neutrophils % 69.0 %    Lymphocytes % 20.0 %    Monocytes % 7.0 %    Eosinophils % 1.0 %    Basophils % 0.0 %    Neutrophils Absolute 11.1 (H) 1.7 - 7.7 K/uL    Lymphocytes Absolute 3.3 1.0 - 5.1 K/uL    Monocytes Absolute 1.1 0.0 - 1.3 K/uL    Eosinophils Absolute 0.2 0.0 - 0.6 K/uL    Basophils Absolute 0.0 0.0 - 0.2 K/uL    Bands Relative 1 0 - 7 % Atypical Lymphocytes Relative 1 0 - 6 %    Metamyelocytes Relative 1 (A) %    nRBC 1 (A) /100 WBC    Anisocytosis 2+ (A)     Macrocytes 1+ (A)     Polychromasia 1+ (A)     Poikilocytes 3+ (A)     Ovalocytes 1+ (A)     Stomatocytes Occasional (A)     Target Cells Occasional (A)     Basophilic Stippling Occasional (A)     Sickle Cells 1+ (A)    Comprehensive Metabolic Panel w/ Reflex to MG   Result Value Ref Range    Sodium 140 136 - 145 mmol/L    Potassium reflex Magnesium 4.4 3.5 - 5.1 mmol/L    Chloride 104 99 - 110 mmol/L    CO2 25 21 - 32 mmol/L    Anion Gap 11 3 - 16    Glucose 92 70 - 99 mg/dL    BUN 13 7 - 20 mg/dL    CREATININE 0.8 (L) 0.9 - 1.3 mg/dL    GFR Non-African American >60 >60    GFR African American >60 >60    Calcium 9.5 8.3 - 10.6 mg/dL    Total Protein 7.0 6.4 - 8.2 g/dL    Albumin 4.5 3.4 - 5.0 g/dL    Albumin/Globulin Ratio 1.8 1.1 - 2.2    Total Bilirubin 4.7 (H) 0.0 - 1.0 mg/dL    Alkaline Phosphatase 96 40 - 129 U/L    ALT 10 10 - 40 U/L    AST 24 15 - 37 U/L   Reticulocytes   Result Value Ref Range    Retic Ct Pct 10.78 (H) 0.50 - 2.18 %    Retic Ct Abs 0.250 M/uL    Immature Retic Fract 0.60 (H) 0.21 - 0.37         RADIOLOGY  X-RAYS:  I have reviewed radiologic plain film image(s). ALL OTHER NON-PLAIN FILM IMAGES SUCH AS CT, ULTRASOUND AND MRI HAVE BEEN READ BY THE RADIOLOGIST. No orders to display            ED COURSE/MDM  Patient seen and evaluated. Old records reviewed. Labs and imaging reviewed and results discussed with patient. Patient presenting with cell pain crisis. He does have fairly high ED utilization, and he has prescribed quite a high amount of narcotic pain medication. There are concerns for potential abuse. Regardless, IV access was obtained via ultrasound and laboratory evaluation was obtained. Appropriate reticulocyte count. Hemoglobin is 7.7 which is not significantly different from baseline. He was given Toradol, IV fluids and Dilaudid 1 mg x 2.   I was upfront with the patient and told him that we would limit the Dilaudid to a total of 2 doses. He does not have symptoms concerning for acute chest syndrome, critical anemia or other acute process at this time. The patient will be discharged from the emergency department. The patient was counseled on their diagnosis and any medications prescribed. They were advised on the need for PCP followup. They were counseled on the need to return to the emergency department if any of their symptoms were to worsen, change or have any other concerns. Discharged in stable condition. CLINICAL IMPRESSION  1. Sickle cell pain crisis (HCC)        Blood pressure 120/60, pulse 80, temperature 98.7 °F (37.1 °C), temperature source Oral, resp. rate 15, height 5' 8\" (1.727 m), weight 190 lb (86.2 kg), SpO2 97 %. [address] [place] [address] was discharged to home in stable condition. This chart was generated in part by using Dragon Dictation system and may contain errors related to that system including errors in grammar, punctuation, and spelling, as well as words and phrases that may be inappropriate. If there are any questions or concerns please feel free to contact the dictating provider for clarification.      Cindy Diamond MD  02/14/22 0413

## 2022-02-14 NOTE — ED NOTES
Patient provided with discharge information along with followup information. Patient verbalized understanding of instructions. Patient PIV removed intact and per protocol. Patient ambulated to POV with steady gait, no deficits noted by this RN.          [facility]  02/14/22 6587

## 2022-03-04 ENCOUNTER — HOSPITAL ENCOUNTER (EMERGENCY)
Age: 22
Discharge: HOME | End: 2022-03-04
Payer: COMMERCIAL

## 2022-03-04 ENCOUNTER — APPOINTMENT (OUTPATIENT)
Dept: GENERAL RADIOLOGY | Age: 22
End: 2022-03-04
Payer: COMMERCIAL

## 2022-03-04 VITALS
TEMPERATURE: 98.2 F | WEIGHT: 180 LBS | HEART RATE: 69 BPM | SYSTOLIC BLOOD PRESSURE: 113 MMHG | RESPIRATION RATE: 18 BRPM | OXYGEN SATURATION: 95 % | BODY MASS INDEX: 27.37 KG/M2 | DIASTOLIC BLOOD PRESSURE: 55 MMHG

## 2022-03-04 DIAGNOSIS — D57.00 SICKLE CELL DISEASE WITH CRISIS (HCC): Primary | ICD-10-CM

## 2022-03-04 DIAGNOSIS — G89.29 OTHER CHRONIC PAIN: ICD-10-CM

## 2022-03-04 LAB
A/G RATIO: 2 (ref 1.1–2.2)
ALBUMIN SERPL-MCNC: 4.6 G/DL (ref 3.4–5)
ALP BLD-CCNC: 98 U/L (ref 40–129)
ALT SERPL-CCNC: 15 U/L (ref 10–40)
AMORPHOUS: ABNORMAL /HPF
ANION GAP SERPL CALCULATED.3IONS-SCNC: 13 MMOL/L (ref 3–16)
ANISOCYTOSIS: ABNORMAL
AST SERPL-CCNC: 33 U/L (ref 15–37)
BACTERIA: ABNORMAL /HPF
BASOPHILIC STIPPLING: ABNORMAL
BASOPHILS ABSOLUTE: 0 K/UL (ref 0–0.2)
BASOPHILS RELATIVE PERCENT: 0 %
BILIRUB SERPL-MCNC: 4.7 MG/DL (ref 0–1)
BILIRUBIN URINE: NEGATIVE
BLOOD, URINE: ABNORMAL
BUN BLDV-MCNC: 5 MG/DL (ref 7–20)
CALCIUM SERPL-MCNC: 9.2 MG/DL (ref 8.3–10.6)
CHLORIDE BLD-SCNC: 105 MMOL/L (ref 99–110)
CLARITY: CLEAR
CO2: 24 MMOL/L (ref 21–32)
COLOR: YELLOW
CREAT SERPL-MCNC: <0.5 MG/DL (ref 0.9–1.3)
EOSINOPHILS ABSOLUTE: 0.2 K/UL (ref 0–0.6)
EOSINOPHILS RELATIVE PERCENT: 1 %
EPITHELIAL CELLS, UA: ABNORMAL /HPF (ref 0–5)
GFR AFRICAN AMERICAN: >60
GFR NON-AFRICAN AMERICAN: >60
GLUCOSE BLD-MCNC: 118 MG/DL (ref 70–99)
GLUCOSE URINE: NEGATIVE MG/DL
HCT VFR BLD CALC: 22.1 % (ref 40.5–52.5)
HEMATOLOGY PATH CONSULT: NO
HEMOGLOBIN: 7.9 G/DL (ref 13.5–17.5)
IMMATURE RETIC FRACT: 0.65 (ref 0.21–0.37)
KETONES, URINE: NEGATIVE MG/DL
LEUKOCYTE ESTERASE, URINE: NEGATIVE
LYMPHOCYTES ABSOLUTE: 4.4 K/UL (ref 1–5.1)
LYMPHOCYTES RELATIVE PERCENT: 27 %
MACROCYTES: ABNORMAL
MCH RBC QN AUTO: 33.6 PG (ref 26–34)
MCHC RBC AUTO-ENTMCNC: 35.8 G/DL (ref 31–36)
MCV RBC AUTO: 94 FL (ref 80–100)
MICROCYTES: ABNORMAL
MICROSCOPIC EXAMINATION: YES
MONOCYTES ABSOLUTE: 1.6 K/UL (ref 0–1.3)
MONOCYTES RELATIVE PERCENT: 10 %
NEUTROPHILS ABSOLUTE: 10.1 K/UL (ref 1.7–7.7)
NEUTROPHILS RELATIVE PERCENT: 62 %
NITRITE, URINE: NEGATIVE
OVALOCYTES: ABNORMAL
PDW BLD-RTO: 25.6 % (ref 12.4–15.4)
PH UA: 7 (ref 5–8)
PLATELET # BLD: 437 K/UL (ref 135–450)
PLATELET SLIDE REVIEW: ADEQUATE
PMV BLD AUTO: 8.1 FL (ref 5–10.5)
POIKILOCYTES: ABNORMAL
POLYCHROMASIA: ABNORMAL
POTASSIUM REFLEX MAGNESIUM: 3.9 MMOL/L (ref 3.5–5.1)
PROTEIN UA: ABNORMAL MG/DL
RBC # BLD: 2.35 M/UL (ref 4.2–5.9)
RBC UA: ABNORMAL /HPF (ref 0–4)
RETICULOCYTE ABSOLUTE COUNT: 0.3 M/UL
RETICULOCYTE COUNT PCT: 12.75 % (ref 0.5–2.18)
SCHISTOCYTES: ABNORMAL
SICKLE CELLS: ABNORMAL
SLIDE REVIEW: ABNORMAL
SODIUM BLD-SCNC: 142 MMOL/L (ref 136–145)
SPECIFIC GRAVITY UA: 1.01 (ref 1–1.03)
STOMATOCYTES: ABNORMAL
TARGET CELLS: ABNORMAL
TOTAL PROTEIN: 6.9 G/DL (ref 6.4–8.2)
URINE REFLEX TO CULTURE: ABNORMAL
URINE TYPE: ABNORMAL
UROBILINOGEN, URINE: 0.2 E.U./DL
WBC # BLD: 16.3 K/UL (ref 4–11)
WBC UA: ABNORMAL /HPF (ref 0–5)

## 2022-03-04 PROCEDURE — 6360000002 HC RX W HCPCS: Performed by: NURSE PRACTITIONER

## 2022-03-04 PROCEDURE — 96375 TX/PRO/DX INJ NEW DRUG ADDON: CPT

## 2022-03-04 PROCEDURE — 71045 X-RAY EXAM CHEST 1 VIEW: CPT

## 2022-03-04 PROCEDURE — 85045 AUTOMATED RETICULOCYTE COUNT: CPT

## 2022-03-04 PROCEDURE — 85025 COMPLETE CBC W/AUTO DIFF WBC: CPT

## 2022-03-04 PROCEDURE — 2580000003 HC RX 258: Performed by: NURSE PRACTITIONER

## 2022-03-04 PROCEDURE — 81001 URINALYSIS AUTO W/SCOPE: CPT

## 2022-03-04 PROCEDURE — 99284 EMERGENCY DEPT VISIT MOD MDM: CPT

## 2022-03-04 PROCEDURE — 80053 COMPREHEN METABOLIC PANEL: CPT

## 2022-03-04 PROCEDURE — 96374 THER/PROPH/DIAG INJ IV PUSH: CPT

## 2022-03-04 RX ORDER — KETOROLAC TROMETHAMINE 30 MG/ML
30 INJECTION, SOLUTION INTRAMUSCULAR; INTRAVENOUS ONCE
Status: COMPLETED | OUTPATIENT
Start: 2022-03-04 | End: 2022-03-04

## 2022-03-04 RX ORDER — 0.9 % SODIUM CHLORIDE 0.9 %
1000 INTRAVENOUS SOLUTION INTRAVENOUS ONCE
Status: COMPLETED | OUTPATIENT
Start: 2022-03-04 | End: 2022-03-04

## 2022-03-04 RX ADMIN — HYDROMORPHONE HYDROCHLORIDE 0.5 MG: 1 INJECTION, SOLUTION INTRAMUSCULAR; INTRAVENOUS; SUBCUTANEOUS at 20:01

## 2022-03-04 RX ADMIN — SODIUM CHLORIDE 1000 ML: 9 INJECTION, SOLUTION INTRAVENOUS at 18:55

## 2022-03-04 RX ADMIN — KETOROLAC TROMETHAMINE 30 MG: 30 INJECTION, SOLUTION INTRAMUSCULAR at 18:55

## 2022-03-04 ASSESSMENT — PAIN - FUNCTIONAL ASSESSMENT
PAIN_FUNCTIONAL_ASSESSMENT: 0-10
PAIN_FUNCTIONAL_ASSESSMENT: 0-10

## 2022-03-04 ASSESSMENT — PAIN SCALES - GENERAL
PAINLEVEL_OUTOF10: 9
PAINLEVEL_OUTOF10: 9
PAINLEVEL_OUTOF10: 8
PAINLEVEL_OUTOF10: 9

## 2022-03-04 ASSESSMENT — PAIN DESCRIPTION - LOCATION
LOCATION: BACK
LOCATION: BACK

## 2022-03-04 ASSESSMENT — PAIN DESCRIPTION - PAIN TYPE
TYPE: ACUTE PAIN;CHRONIC PAIN
TYPE: ACUTE PAIN;CHRONIC PAIN

## 2022-03-05 NOTE — ED NOTES
Pt instructed on pain medication and IV fluids ordered per NP. Pt states \"can I have anything stronger? \" This RN informed pt that she would let provider know pt is requesting something stronger for pain but in the mean time toradol is what the provider ordered to start with for pain, if he still wanted to take it. Pt verbalized understanding.       Kedar Mario RN  03/04/22 1958

## 2022-03-10 NOTE — ED PROVIDER NOTES
[address] [place] [address]  ED  EMERGENCY DEPARTMENT ENCOUNTER      This patient was not seen and evaluated by the attending physician. Pt Name: Rashmi Fontanez  MRN: [id number]  [place] 2000  Date of evaluation: 3/4/2022  Provider: CARMELITA Rubalcava CNP-C  PCP: Enid Low MD      History provided by the patient. CHIEFCOMPLAINT:     Chief Complaint   Patient presents with    Sickle Cell Pain Crisis     states back pain for a couple days, hx sickle cell        HISTORY OF PRESENT ILLNESS:      Rashmi Fontanez is a 24 y.o. male who presents to [address] [place] [address]  ED with complaints of back pain. Patient states that he had back pain for couple days, states that it severe nature, he is texting on his phone he does not appear to be acutely distressed although this is similar to previous presentations. Patient does requests to be admitted to the hospital for his pain, patient is frequent here in the ED and has sickle cell, does see [facility] as an outpatient. He is here for further evaluation. Mercedes Mobley  SEVERITY:9  DURATION:today  MODIFYING FACTORS:none noted    Nursing Notes were reviewed     REVIEW OF SYSTEMS:     Review of Systems  All systems, a total of 10, are reviewed and negative except for those that were just noted in history present illness.         PAST MEDICAL HISTORY:     Past Medical History:   Diagnosis Date    Accidental overdose     Asthma     DVT (deep venous thrombosis) ([facility]) 10/19/2021    R leg    Enlarged heart     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 ([facility])     Sickle cell anemia ([facility])          SURGICAL HISTORY:      Past Surgical History:   Procedure Laterality Date    SPLENECTOMY           CURRENT MEDICATIONS:       Discharge Medication List as of 3/4/2022  8:06 PM      CONTINUE these medications which have NOT CHANGED    Details   apixaban (ELIQUIS) 5 MG TABS tablet Take 1 tablet by mouth 2 times daily, Disp-60 tablet, R-0Normal      pantoprazole (PROTONIX) 40 MG tablet Take 1 tablet by mouth every morning (before breakfast), Disp-30 tablet, [id number]      folic acid (FOLVITE) 1 MG tablet Take 2 tablets by mouth daily, Disp-30 tablet, R-3Normal      oxyCODONE HCl (OXY-IR) 10 MG immediate release tablet Take 10 mg by mouth every 4 hours as needed for Pain. Historical Med      albuterol sulfate HFA (PROAIR HFA) 108 (90 Base) MCG/ACT inhaler Albuterol HFA Inhaler 90 mcg/actuation  inhaled  2 puffs prn     ActiveHistorical Med      levalbuterol (XOPENEX) 0.31 MG/3ML nebulization Levalbuterol Nebulized    2 puffs prn     ActiveHistorical Med      docusate (COLACE, DULCOLAX) 100 MG CAPS Take 100 mg by mouthHistorical Med      ibuprofen (ADVIL;MOTRIN) 800 MG tablet ibuprofen 800 MG Oral Tablet  oral   prn    ActiveHistorical Med      hydroxyurea (HYDREA) 500 MG chemo capsule Take 1,000 mg by mouth daily Historical Med               ALLERGIES:    Morphine    FAMILY HISTORY:       Family History   Problem Relation Age of Onset    Other Father         sarcoidosis          SOCIAL HISTORY:     Social History     Socioeconomic History    Marital status: Single     Spouse name: None    Number of children: 0    Years of education: None    Highest education level: None   Occupational History    None   Tobacco Use    Smoking status: Never Smoker    Smokeless tobacco: Never Used   Vaping Use    Vaping Use: Never used   Substance and Sexual Activity    Alcohol use: Not Currently    Drug use: Never    Sexual activity: Not Currently   Other Topics Concern    None   Social History Narrative    None     Social Determinants of Health     Financial Resource Strain:     Difficulty of Paying Living Expenses: Not on file   Food Insecurity:     Worried About Running Out of Food in the Last Year: Not on file    Laura of Food in the Last Year: Not on file   Transportation Needs:     Lack of Transportation (Medical):  Not on file    Lack of Transportation (Non-Medical): Not on file   Physical Activity:     Days of Exercise per Week: Not on file    Minutes of Exercise per Session: Not on file   Stress:     Feeling of Stress : Not on file   Social Connections:     Frequency of Communication with Friends and Family: Not on file    Frequency of Social Gatherings with Friends and Family: Not on file    Attends Adventism Services: Not on file    Active Member of [address] [place] [address] [employer] or Organizations: Not on file    Attends Club or Organization Meetings: Not on file    Marital Status: Not on file   Intimate Partner Violence:     Fear of Current or Ex-Partner: Not on file    Emotionally Abused: Not on file    Physically Abused: Not on file    Sexually Abused: Not on file   Housing Stability:     Unable to Pay for Housing in the Last Year: Not on file    Number of [place] in the Last Year: Not on file    Unstable Housing in the Last Year: Not on file       SCREENINGS:             PHYSICAL EXAM:       ED Triage Vitals   BP Temp Temp Source Pulse Resp SpO2 Height Weight   03/04/22 1829 03/04/22 1829 03/04/22 1829 03/04/22 1829 03/04/22 1829 03/04/22 1830 -- 03/04/22 1830   137/64 98.2 °F (36.8 °C) Oral 76 18 95 %  180 lb (81.6 kg)       Physical Exam    CONSTITUTIONAL: Awake and alert. Cooperative. Well-developed. Well-nourished. Vitals:    03/04/22 1829 03/04/22 1830 03/04/22 2016 03/04/22 2023   BP: 137/64  (!) 113/55    Pulse: 76  69    Resp: 18  18    Temp: 98.2 °F (36.8 °C)      TempSrc: Oral      SpO2:  95% 95% 95%   Weight:  180 lb (81.6 kg)       HENT: Normocephalic. Atraumatic. External ears normal, without discharge. TMs clear bilaterally. Nonasal discharge. Oropharynx clear, no erythema. Mucous membranes moist.  EYES: Conjunctiva non-injected, nolid abnormalities noted. No scleral icterus. PERRL. EOM's grossly intact. Anterior chambers clear. NECK: Supple. Normal ROM. No meningismus. No thyroid tenderness or swelling noted.   CARDIOVASCULAR: RRR. No [place]. No carotid bruits. PULMONARY/CHEST WALL: Effort normal. No tachypnea. Lungs clear to ausculation. ABDOMEN: Normal BS. Soft. Nondistended. No tenderness to palpation. No guarding. No hernias noted. No splenomegaly. Back: Spine is midline. No ecchymosis. No crepituson palpation. No obvious subluxation of vertebral column. No saddle anesthesia or evidence of cauda equina. No focal tenderness on exam  /ANORECTAL: Not assessed  MUSKULOSKELETAL: Normal ROM. No acute deformities. No edema. No tenderness to palpate. SKIN: Warm and dry. NEUROLOGICAL:  GCS 15. CN II-XII grossly intact. Strength is 5/5 in all extremities and sensation is intact. PSYCHIATRIC: Normal affect, normal insight and judgement. Alert and oriented x 3.         DIAGNOSTIC RESULTS:     LABS:    Results for orders placed or performed during the hospital encounter of 03/04/22   CBC with Auto Differential   Result Value Ref Range    WBC 16.3 (H) 4.0 - 11.0 K/uL    RBC 2.35 (L) 4.20 - 5.90 M/uL    Hemoglobin 7.9 (L) 13.5 - 17.5 g/dL    Hematocrit 22.1 (L) 40.5 - 52.5 %    MCV 94.0 80.0 - 100.0 fL    MCH 33.6 26.0 - 34.0 pg    MCHC 35.8 31.0 - 36.0 g/dL    RDW 25.6 (H) 12.4 - 15.4 %    Platelets 731 593 - 276 K/uL    MPV 8.1 5.0 - 10.5 fL    PLATELET SLIDE REVIEW Adequate     SLIDE REVIEW see below     Path Consult No     Neutrophils % 62.0 %    Lymphocytes % 27.0 %    Monocytes % 10.0 %    Eosinophils % 1.0 %    Basophils % 0.0 %    Neutrophils Absolute 10.1 (H) 1.7 - 7.7 K/uL    Lymphocytes Absolute 4.4 1.0 - 5.1 K/uL    Monocytes Absolute 1.6 (H) 0.0 - 1.3 K/uL    Eosinophils Absolute 0.2 0.0 - 0.6 K/uL    Basophils Absolute 0.0 0.0 - 0.2 K/uL    Anisocytosis 2+ (A)     Macrocytes 1+ (A)     Microcytes Occasional (A)     Polychromasia 1+ (A)     Poikilocytes 3+ (A)     Schistocytes Occasional (A)     Ovalocytes Occasional (A)     Stomatocytes Occasional (A)     Target Cells Occasional (A)     Basophilic Stippling Occasional (A) Sickle Cells 2+ (A)    Comprehensive Metabolic Panel w/ Reflex to MG   Result Value Ref Range    Sodium 142 136 - 145 mmol/L    Potassium reflex Magnesium 3.9 3.5 - 5.1 mmol/L    Chloride 105 99 - 110 mmol/L    CO2 24 21 - 32 mmol/L    Anion Gap 13 3 - 16    Glucose 118 (H) 70 - 99 mg/dL    BUN 5 (L) 7 - 20 mg/dL    CREATININE <0.5 (L) 0.9 - 1.3 mg/dL    GFR Non-African American >60 >60    GFR African American >60 >60    Calcium 9.2 8.3 - 10.6 mg/dL    Total Protein 6.9 6.4 - 8.2 g/dL    Albumin 4.6 3.4 - 5.0 g/dL    Albumin/Globulin Ratio 2.0 1.1 - 2.2    Total Bilirubin 4.7 (H) 0.0 - 1.0 mg/dL    Alkaline Phosphatase 98 40 - 129 U/L    ALT 15 10 - 40 U/L    AST 33 15 - 37 U/L   Urinalysis with Reflex to Culture    Specimen: Urine   Result Value Ref Range    Color, UA Yellow Straw/Yellow    Clarity, UA Clear Clear    Glucose, Ur Negative Negative mg/dL    Bilirubin Urine Negative Negative    Ketones, Urine Negative Negative mg/dL    Specific Gravity, UA 1.015 1.005 - 1.030    Blood, Urine TRACE-INTACT (A) Negative    pH, UA 7.0 5.0 - 8.0    Protein, UA TRACE (A) Negative mg/dL    Urobilinogen, Urine 0.2 <2.0 E.U./dL    Nitrite, Urine Negative Negative    Leukocyte Esterase, Urine Negative Negative    Microscopic Examination YES     Urine Type NotGiven     Urine Reflex to Culture Not Indicated    Reticulocytes   Result Value Ref Range    Retic Ct Pct 12.75 (H) 0.50 - 2.18 %    Retic Ct Abs 0.299 M/uL    Immature Retic Fract 0.65 (H) 0.21 - 0.37   Microscopic Urinalysis   Result Value Ref Range    WBC, UA 3-5 0 - 5 /HPF    RBC, UA 0-2 0 - 4 /HPF    Epithelial Cells, UA 0-1 0 - 5 /HPF    Bacteria, UA 1+ (A) None Seen /HPF    Amorphous, UA Rare /HPF         RADIOLOGY:  All x-ray studies are viewed/reviewed by me. Formal interpretations per the radiologist are as follows:      XR CHEST PORTABLE   Final Result   Stable chest with no acute abnormality seen.                  EKG:  See EKG interpretation by an attending physician. PROCEDURES:   N/A    CRITICAL CARE TIME:   N/A    CONSULTS:  None      EMERGENCY DEPARTMENT COURSE andDIFFERENTIAL DIAGNOSIS/MDM:   Vitals:    Vitals:    03/04/22 1829 03/04/22 1830 03/04/22 2016 03/04/22 2023   BP: 137/64  (!) 113/55    Pulse: 76  69    Resp: 18  18    Temp: 98.2 °F (36.8 °C)      TempSrc: Oral      SpO2:  95% 95% 95%   Weight:  180 lb (81.6 kg)         Patient wasgiven the following medications:  Medications   ketorolac (TORADOL) injection 30 mg (30 mg IntraVENous Given 3/4/22 1855)   0.9 % sodium chloride bolus (0 mLs IntraVENous Stopped 3/4/22 1959)   HYDROmorphone (DILAUDID) injection 0.5 mg (0.5 mg IntraVENous Given 3/4/22 2001)         Patient was evaluated independently by myself with the attending physician available for consultation. Patient presented to the emergency room today with complaints of back pain, patient is well-known to this department, work-up today similar to previous, hemoglobin 7.9 white blood cell count of 16.3. His vital signs are stable with no tachycardia, reticulocyte count elevated. Patient is given Toradol and fluids initially, he did request more pain medication, had a discussion with the patient regards to his chronic narcotic use and use of the ED for pain control, I did tell him I would be willing to give him 1 more dose of Dilaudid here but I would not be giving him any more pain medication as I saw no indications that necessitates this. Patient was not happy with this but did agree, he was given 1 dose and discharged home to follow-up as an outpatient. Patient with a significant quantity of pain medication to use as an outpatient. I do feel that this patient would benefit from being placed on a care plan given the frequency of his ED visits. He was discharged in good condition. Patient laboratory studies, radiographic imaging, and assessment were all discussed with the patient and/orpatient family.   There was shared decision-making between myself as well as the patient and/or their surrogate and we are all in agreement with discharge home. There was an opportunity for questions and all questions were answered tothe best of my ability and to the satisfaction of the patient and/or patient family. FINAL IMPRESSION:      1.  Sickle cell disease with crisis ([address]    2. Other chronic pain          DISPOSITION/PLAN:   DISPOSITION Decision To Discharge      PATIENT REFERRED TO:  Hilario Ross MD  [address] [place] [address]  [phone]    Call   For follow up      DISCHARGE MEDICATIONS:  Discharge Medication List as of 3/4/2022  8:06 PM                     (Please note thatportions of this note were completed with a voice recognition program.  Efforts were made to edit the dictations, but occasionally words are mis-transcribed.)    CARMELITA Olivarez - CNP-C (electronicallysigned)       CARMELITA Olivarez CNP  03/09/22 2282

## 2022-03-17 ENCOUNTER — HOSPITAL ENCOUNTER (EMERGENCY)
Age: 22
Discharge: HOME | End: 2022-03-17
Attending: EMERGENCY MEDICINE
Payer: COMMERCIAL

## 2022-03-17 ENCOUNTER — APPOINTMENT (OUTPATIENT)
Dept: CT IMAGING | Age: 22
End: 2022-03-17
Payer: COMMERCIAL

## 2022-03-17 VITALS
HEART RATE: 82 BPM | OXYGEN SATURATION: 99 % | WEIGHT: 190 LBS | BODY MASS INDEX: 28.79 KG/M2 | DIASTOLIC BLOOD PRESSURE: 67 MMHG | SYSTOLIC BLOOD PRESSURE: 118 MMHG | HEIGHT: 68 IN | TEMPERATURE: 97 F | RESPIRATION RATE: 16 BRPM

## 2022-03-17 DIAGNOSIS — D57.00 SICKLE CELL PAIN CRISIS (HCC): Primary | ICD-10-CM

## 2022-03-17 DIAGNOSIS — D57.1 PRIAPISM DUE TO SICKLE CELL DISEASE (HCC): ICD-10-CM

## 2022-03-17 DIAGNOSIS — N48.32 PRIAPISM DUE TO SICKLE CELL DISEASE (HCC): ICD-10-CM

## 2022-03-17 LAB
A/G RATIO: 2.1 (ref 1.1–2.2)
ABO/RH: NORMAL
ALBUMIN SERPL-MCNC: 4.6 G/DL (ref 3.4–5)
ALP BLD-CCNC: 98 U/L (ref 40–129)
ALT SERPL-CCNC: 19 U/L (ref 10–40)
ANION GAP SERPL CALCULATED.3IONS-SCNC: 7 MMOL/L (ref 3–16)
ANISOCYTOSIS: ABNORMAL
ANTIBODY SCREEN: NORMAL
AST SERPL-CCNC: 39 U/L (ref 15–37)
ATYPICAL LYMPHOCYTE RELATIVE PERCENT: 1 % (ref 0–6)
BANDED NEUTROPHILS RELATIVE PERCENT: 1 % (ref 0–7)
BASOPHILIC STIPPLING: ABNORMAL
BASOPHILS ABSOLUTE: 0 K/UL (ref 0–0.2)
BASOPHILS RELATIVE PERCENT: 0 %
BILIRUB SERPL-MCNC: 5.6 MG/DL (ref 0–1)
BUN BLDV-MCNC: 9 MG/DL (ref 7–20)
CALCIUM SERPL-MCNC: 9.2 MG/DL (ref 8.3–10.6)
CHLORIDE BLD-SCNC: 106 MMOL/L (ref 99–110)
CO2: 26 MMOL/L (ref 21–32)
CREAT SERPL-MCNC: 0.7 MG/DL (ref 0.9–1.3)
EOSINOPHILS ABSOLUTE: 0.4 K/UL (ref 0–0.6)
EOSINOPHILS RELATIVE PERCENT: 3 %
GFR AFRICAN AMERICAN: >60
GFR NON-AFRICAN AMERICAN: >60
GLUCOSE BLD-MCNC: 108 MG/DL (ref 70–99)
HCT VFR BLD CALC: 20.6 % (ref 40.5–52.5)
HEMATOLOGY PATH CONSULT: NO
HEMOGLOBIN: 7.3 G/DL (ref 13.5–17.5)
IMMATURE RETIC FRACT: 0.72 (ref 0.21–0.37)
LYMPHOCYTES ABSOLUTE: 3.1 K/UL (ref 1–5.1)
LYMPHOCYTES RELATIVE PERCENT: 23 %
MACROCYTES: ABNORMAL
MCH RBC QN AUTO: 33.7 PG (ref 26–34)
MCHC RBC AUTO-ENTMCNC: 35.5 G/DL (ref 31–36)
MCV RBC AUTO: 95 FL (ref 80–100)
MICROCYTES: ABNORMAL
MONOCYTES ABSOLUTE: 0.8 K/UL (ref 0–1.3)
MONOCYTES RELATIVE PERCENT: 6 %
NEUTROPHILS ABSOLUTE: 8.6 K/UL (ref 1.7–7.7)
NEUTROPHILS RELATIVE PERCENT: 66 %
NUCLEATED RED BLOOD CELLS: 7 /100 WBC
OVALOCYTES: ABNORMAL
PDW BLD-RTO: 24.3 % (ref 12.4–15.4)
PLATELET # BLD: 367 K/UL (ref 135–450)
PLATELET SLIDE REVIEW: ADEQUATE
PMV BLD AUTO: 8.5 FL (ref 5–10.5)
POIKILOCYTES: ABNORMAL
POLYCHROMASIA: ABNORMAL
POTASSIUM REFLEX MAGNESIUM: 4 MMOL/L (ref 3.5–5.1)
RBC # BLD: 2.16 M/UL (ref 4.2–5.9)
RETICULOCYTE ABSOLUTE COUNT: 0.37 M/UL
RETICULOCYTE COUNT PCT: 17.21 % (ref 0.5–2.18)
SCHISTOCYTES: ABNORMAL
SICKLE CELLS: ABNORMAL
SLIDE REVIEW: ABNORMAL
SODIUM BLD-SCNC: 139 MMOL/L (ref 136–145)
STOMATOCYTES: ABNORMAL
TOTAL PROTEIN: 6.8 G/DL (ref 6.4–8.2)
WBC # BLD: 12.8 K/UL (ref 4–11)

## 2022-03-17 PROCEDURE — 71260 CT THORAX DX C+: CPT

## 2022-03-17 PROCEDURE — 96376 TX/PRO/DX INJ SAME DRUG ADON: CPT

## 2022-03-17 PROCEDURE — 86900 BLOOD TYPING SEROLOGIC ABO: CPT

## 2022-03-17 PROCEDURE — 6360000002 HC RX W HCPCS: Performed by: EMERGENCY MEDICINE

## 2022-03-17 PROCEDURE — 96375 TX/PRO/DX INJ NEW DRUG ADDON: CPT

## 2022-03-17 PROCEDURE — 2500000003 HC RX 250 WO HCPCS

## 2022-03-17 PROCEDURE — 96374 THER/PROPH/DIAG INJ IV PUSH: CPT

## 2022-03-17 PROCEDURE — 6370000000 HC RX 637 (ALT 250 FOR IP): Performed by: EMERGENCY MEDICINE

## 2022-03-17 PROCEDURE — 85045 AUTOMATED RETICULOCYTE COUNT: CPT

## 2022-03-17 PROCEDURE — 99285 EMERGENCY DEPT VISIT HI MDM: CPT

## 2022-03-17 PROCEDURE — 6360000004 HC RX CONTRAST MEDICATION: Performed by: EMERGENCY MEDICINE

## 2022-03-17 PROCEDURE — 80053 COMPREHEN METABOLIC PANEL: CPT

## 2022-03-17 PROCEDURE — 86901 BLOOD TYPING SEROLOGIC RH(D): CPT

## 2022-03-17 PROCEDURE — 36415 COLL VENOUS BLD VENIPUNCTURE: CPT

## 2022-03-17 PROCEDURE — 2580000003 HC RX 258: Performed by: EMERGENCY MEDICINE

## 2022-03-17 PROCEDURE — 96372 THER/PROPH/DIAG INJ SC/IM: CPT

## 2022-03-17 PROCEDURE — 86850 RBC ANTIBODY SCREEN: CPT

## 2022-03-17 PROCEDURE — 85025 COMPLETE CBC W/AUTO DIFF WBC: CPT

## 2022-03-17 RX ORDER — OXYCODONE HYDROCHLORIDE 5 MG/1
10 TABLET ORAL ONCE
Status: COMPLETED | OUTPATIENT
Start: 2022-03-17 | End: 2022-03-17

## 2022-03-17 RX ORDER — HYDROMORPHONE HCL 110MG/55ML
2 PATIENT CONTROLLED ANALGESIA SYRINGE INTRAVENOUS ONCE
Status: COMPLETED | OUTPATIENT
Start: 2022-03-17 | End: 2022-03-17

## 2022-03-17 RX ORDER — KETOROLAC TROMETHAMINE 30 MG/ML
15 INJECTION, SOLUTION INTRAMUSCULAR; INTRAVENOUS ONCE
Status: COMPLETED | OUTPATIENT
Start: 2022-03-17 | End: 2022-03-17

## 2022-03-17 RX ORDER — 0.9 % SODIUM CHLORIDE 0.9 %
1000 INTRAVENOUS SOLUTION INTRAVENOUS ONCE
Status: COMPLETED | OUTPATIENT
Start: 2022-03-17 | End: 2022-03-17

## 2022-03-17 RX ORDER — LIDOCAINE HYDROCHLORIDE 10 MG/ML
INJECTION, SOLUTION INFILTRATION; PERINEURAL
Status: COMPLETED
Start: 2022-03-17 | End: 2022-03-17

## 2022-03-17 RX ORDER — SODIUM CHLORIDE 9 MG/ML
INJECTION, SOLUTION INTRAVENOUS PRN
Status: DISCONTINUED | OUTPATIENT
Start: 2022-03-17 | End: 2022-03-17 | Stop reason: CLARIF

## 2022-03-17 RX ORDER — SODIUM CHLORIDE, SODIUM LACTATE, POTASSIUM CHLORIDE, AND CALCIUM CHLORIDE .6; .31; .03; .02 G/100ML; G/100ML; G/100ML; G/100ML
1000 INJECTION, SOLUTION INTRAVENOUS ONCE
Status: DISCONTINUED | OUTPATIENT
Start: 2022-03-17 | End: 2022-03-17

## 2022-03-17 RX ADMIN — LIDOCAINE HYDROCHLORIDE: 10 INJECTION, SOLUTION INFILTRATION; PERINEURAL at 15:59

## 2022-03-17 RX ADMIN — OXYCODONE 10 MG: 5 TABLET ORAL at 16:51

## 2022-03-17 RX ADMIN — KETOROLAC TROMETHAMINE 15 MG: 30 INJECTION, SOLUTION INTRAMUSCULAR at 05:27

## 2022-03-17 RX ADMIN — HYDROMORPHONE HYDROCHLORIDE 2 MG: 2 INJECTION, SOLUTION INTRAMUSCULAR; INTRAVENOUS; SUBCUTANEOUS at 11:08

## 2022-03-17 RX ADMIN — IOPAMIDOL 75 ML: 755 INJECTION, SOLUTION INTRAVENOUS at 06:24

## 2022-03-17 RX ADMIN — Medication: at 15:59

## 2022-03-17 RX ADMIN — HYDROMORPHONE HYDROCHLORIDE 1 MG: 1 INJECTION, SOLUTION INTRAMUSCULAR; INTRAVENOUS; SUBCUTANEOUS at 05:27

## 2022-03-17 RX ADMIN — SODIUM CHLORIDE 1000 ML: 9 INJECTION, SOLUTION INTRAVENOUS at 05:26

## 2022-03-17 RX ADMIN — HYDROMORPHONE HYDROCHLORIDE 2 MG: 2 INJECTION, SOLUTION INTRAMUSCULAR; INTRAVENOUS; SUBCUTANEOUS at 14:04

## 2022-03-17 RX ADMIN — HYDROMORPHONE HYDROCHLORIDE 1 MG: 1 INJECTION, SOLUTION INTRAMUSCULAR; INTRAVENOUS; SUBCUTANEOUS at 08:47

## 2022-03-17 RX ADMIN — Medication 1000 MCG: at 14:10

## 2022-03-17 ASSESSMENT — PAIN - FUNCTIONAL ASSESSMENT
PAIN_FUNCTIONAL_ASSESSMENT: 0-10

## 2022-03-17 ASSESSMENT — PAIN DESCRIPTION - DESCRIPTORS
DESCRIPTORS: ACHING;SHARP

## 2022-03-17 ASSESSMENT — PAIN SCALES - GENERAL
PAINLEVEL_OUTOF10: 7
PAINLEVEL_OUTOF10: 9
PAINLEVEL_OUTOF10: 7
PAINLEVEL_OUTOF10: 9
PAINLEVEL_OUTOF10: 9
PAINLEVEL_OUTOF10: 10
PAINLEVEL_OUTOF10: 8
PAINLEVEL_OUTOF10: 9
PAINLEVEL_OUTOF10: 8
PAINLEVEL_OUTOF10: 7
PAINLEVEL_OUTOF10: 6
PAINLEVEL_OUTOF10: 8
PAINLEVEL_OUTOF10: 9

## 2022-03-17 ASSESSMENT — PAIN DESCRIPTION - FREQUENCY
FREQUENCY: CONTINUOUS

## 2022-03-17 ASSESSMENT — PAIN DESCRIPTION - ORIENTATION
ORIENTATION: RIGHT;LEFT

## 2022-03-17 ASSESSMENT — PAIN DESCRIPTION - LOCATION
LOCATION: KNEE;ELBOW

## 2022-03-17 ASSESSMENT — PAIN DESCRIPTION - PAIN TYPE
TYPE: CHRONIC PAIN

## 2022-03-17 ASSESSMENT — PAIN DESCRIPTION - PROGRESSION: CLINICAL_PROGRESSION: GRADUALLY IMPROVING

## 2022-03-17 NOTE — ED PROVIDER NOTES
[facility]  ED PROVIDER NOTE    Patient Identification  Pt Name: Deven Bradshaw  MRN: [id number]  [place] 2000  Date of evaluation: 3/17/2022  Provider: Carlito Armendariz MD  PCP: Inderjit Vargas MD    Chief Complaint  Sickle Cell Pain Crisis (rates pain 10/10)      HPI  History provided by patient   This is a 24 y.o. male who presents to the ED for pain in knees, arms, legs. Similar to prior sickle cell crises. No nausea or fevers. No cough. Takes oxycodone 20mg q4h at home. No chest pain or shortness of breath. No fevers or chills. No nausea or vomiting. He has not taken his Eliquis in over 2 months    ROS  12 systems reviewed, pertinent positives/negatives per HPI otherwise noted to be negative. 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)     Sickle cell anemia (HCC)      Past surgical history:   Past Surgical History:   Procedure Laterality Date    SPLENECTOMY         Home medications:   Discharge Medication List as of 3/17/2022  5:02 PM      CONTINUE these medications which have NOT CHANGED    Details   apixaban (ELIQUIS) 5 MG TABS tablet Take 1 tablet by mouth 2 times daily, Disp-60 tablet, R-0Normal      pantoprazole (PROTONIX) 40 MG tablet Take 1 tablet by mouth every morning (before breakfast), Disp-30 tablet, I-1CLPEOM      folic acid (FOLVITE) 1 MG tablet Take 2 tablets by mouth daily, Disp-30 tablet, R-3Normal      oxyCODONE HCl (OXY-IR) 10 MG immediate release tablet Take 10 mg by mouth every 4 hours as needed for Pain. Historical Med      albuterol sulfate HFA (PROAIR HFA) 108 (90 Base) MCG/ACT inhaler Albuterol HFA Inhaler 90 mcg/actuation  inhaled  2 puffs prn     ActiveHistorical Med      levalbuterol Sandi Lozoya) 0.31 MG/3ML nebulization Levalbuterol Nebulized    2 puffs prn     ActiveHistorical Med      docusate (COLACE, DULCOLAX) 100 MG CAPS Take 100 mg by mouthHistorical Med      ibuprofen (ADVIL;MOTRIN) 800 MG tablet ibuprofen 800 MG Oral Tablet  oral   prn    ActiveHistorical Med      hydroxyurea (HYDREA) 500 MG chemo capsule Take 1,000 mg by mouth daily Historical Med             Social history:  reports that he has never smoked. He has never used smokeless tobacco. He reports previous alcohol use. He reports that he does not use drugs. Family history:    Family History   Problem Relation Age of Onset    Other Father         sarcoidosis         Exam  ED Triage Vitals [03/17/22 0430]   BP Temp Temp Source Pulse Resp SpO2 Height Weight   134/79 97.9 °F (36.6 °C) Oral 83 20 93 % 5' 8\" (1.727 m) 190 lb (86.2 kg)     Nursing note and vitals reviewed. Constitutional: In no acute distress  HENT:      Head: Normocephalic      Ears: External ears normal.      Nose: Nose normal.     Mouth: Membrane mucosa moist   Eyes: No discharge. Neck: Supple. Trachea midline. Cardiovascular: Regular rate. Warm extremities  Pulmonary/Chest: Effort normal. No respiratory distress. ctabl   Abdominal: Soft. No distension. Nontender  : Deferred  Rectal: Deferred   Musculoskeletal: Moves all extremities. No gross deformity. Neurological: Alert and oriented. Face symmetric. Speech is clear. Skin: Warm and dry. Psychiatric: Normal mood and affect. Behavior is normal.    Procedures      Radiology  CT CHEST PULMONARY EMBOLISM W CONTRAST   Final Result   1. No acute abnormality.              Labs  Results for orders placed or performed during the hospital encounter of 03/17/22   CBC with Auto Differential   Result Value Ref Range    WBC 12.8 (H) 4.0 - 11.0 K/uL    RBC 2.16 (L) 4.20 - 5.90 M/uL    Hemoglobin 7.3 (L) 13.5 - 17.5 g/dL    Hematocrit 20.6 (LL) 40.5 - 52.5 %    MCV 95.0 80.0 - 100.0 fL    MCH 33.7 26.0 - 34.0 pg    MCHC 35.5 31.0 - 36.0 g/dL    RDW 24.3 (H) 12.4 - 15.4 %    Platelets 653 727 - 297 K/uL    MPV 8.5 5.0 - 10.5 fL    PLATELET SLIDE REVIEW Adequate     SLIDE REVIEW see below     Path Consult No     Neutrophils % 66.0 %    Lymphocytes % 23.0 %    Monocytes % 6.0 %    Eosinophils % 3.0 %    Basophils % 0.0 %    Neutrophils Absolute 8.6 (H) 1.7 - 7.7 K/uL    Lymphocytes Absolute 3.1 1.0 - 5.1 K/uL    Monocytes Absolute 0.8 0.0 - 1.3 K/uL    Eosinophils Absolute 0.4 0.0 - 0.6 K/uL    Basophils Absolute 0.0 0.0 - 0.2 K/uL    Bands Relative 1 0 - 7 %    Atypical Lymphocytes Relative 1 0 - 6 %    nRBC 7 (A) /100 WBC    Anisocytosis 2+ (A)     Macrocytes 1+ (A)     Microcytes Occasional (A)     Polychromasia 1+ (A)     Poikilocytes 3+ (A)     Schistocytes Occasional (A)     Ovalocytes 1+ (A)     Stomatocytes Occasional (A)     Basophilic Stippling Occasional (A)     Sickle Cells 1+ (A)    Comprehensive Metabolic Panel w/ Reflex to MG   Result Value Ref Range    Sodium 139 136 - 145 mmol/L    Potassium reflex Magnesium 4.0 3.5 - 5.1 mmol/L    Chloride 106 99 - 110 mmol/L    CO2 26 21 - 32 mmol/L    Anion Gap 7 3 - 16    Glucose 108 (H) 70 - 99 mg/dL    BUN 9 7 - 20 mg/dL    CREATININE 0.7 (L) 0.9 - 1.3 mg/dL    GFR Non-African American >60 >60    GFR African American >60 >60    Calcium 9.2 8.3 - 10.6 mg/dL    Total Protein 6.8 6.4 - 8.2 g/dL    Albumin 4.6 3.4 - 5.0 g/dL    Albumin/Globulin Ratio 2.1 1.1 - 2.2    Total Bilirubin 5.6 (H) 0.0 - 1.0 mg/dL    Alkaline Phosphatase 98 40 - 129 U/L    ALT 19 10 - 40 U/L    AST 39 (H) 15 - 37 U/L   Reticulocytes   Result Value Ref Range    Retic Ct Pct 17.21 (H) 0.50 - 2.18 %    Retic Ct Abs 0.369 M/uL    Immature Retic Fract 0.72 (H) 0.21 - 0.37   TYPE AND SCREEN   Result Value Ref Range    ABO/Rh B POS     Antibody Screen NEG        Screenings   Blade Coma Scale  Eye Opening: Spontaneous  Best Verbal Response: Oriented  Best Motor Response: Obeys commands  [place] Coma Scale Score: 15 MDM and ED Course  This is a 24 y.o. male who presents to the ED for pain in arms/legs/knees consistent with prior sickle cell crises. No chest pain or dyspnea but found to be hypoxic to 89%. Since he has been off his Eliquis for the past 2 months, will obtain CT chest with contrast.  Anticipate admission for new hypoxia and pain control. Patient signed out to Dr. Padmini Aquino at [facility] pending CT, reassessment       [unfilled]    Final Impression  1. Sickle cell pain crisis ([address]    2. 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 ([facility])        Blood pressure 118/67, pulse 82, temperature 97 °F (36.1 °C), temperature source Oral, resp. rate 16, height 5' 8\" (1.727 m), weight 190 lb (86.2 kg), SpO2 99 %. Disposition:  DISPOSITION Decision To Discharge 03/17/2022 04:23:22 PM      Patient Referrals:  Nohelia Warren MD  [address] [place] [address] Dr Bowers [address] [place] [address] [place] [address] [phone]    On 3/25/2022  1245pm    [facility], [address] [place] [address]  [phone]    Schedule an appointment as soon as possible for a visit in 2 weeks        Discharge Medications:  Discharge Medication List as of 3/17/2022  5:02 PM          Discontinued Medications:  Discharge Medication List as of 3/17/2022  5:02 PM          This chart was generated using the [employer] dictation system. I created this record but it may contain dictation errors given the limitations of this technology.         Sharla Mccallum MD  03/17/22 3551

## 2022-03-17 NOTE — ED PROVIDER NOTES
6:01 AM: I discussed the history, physical examination, laboratory and imaging studies, and treatment plan with Dr. Norma Galdamez. Conrad Cueva was signed out to me in stable condition. Please see Dr. Devorah Palacios documentation for details of their history, physical, and laboratory studies. Upon re-examination, a summary of Javier Javed's history, physical examination, and studies are as follows:    H/o DVT, hypoxia, SS. My evaluation patient was slightly hypoxic and also quite sleepy from the meds he received. He continued to complain of pain diffusely in his limbs but to me denied any chest pain. He also denies any fevers or vomiting. On reevaluation he was more alert and awake but remains slightly hypoxic and for this reason I initiated admission. While he was awaiting admission however the nurse was able to wean him off of oxygen and he was more awake and alert. I believe his hypoxia was likely secondary to the medications given for his pain. At this point I canceled his admission and prepared his discharge. The nurse removed his IV and provided him with his paperwork at which time he informed us that he had a priapism. I performed a genital exam at this time and found him to have a circumcised erect penis that was very stiff and could not be bent in any direction. Because of this I consulted urology and spoke with Dr. Rosario Corbin. We discussed the case and he agreed with my plan for intracavernosal phenylephrine injection. PROCEDURE  PRIAPISM management  Risks, benefits and alternatives were discussed with the patient and he gave verbal consent for the procedure. The area was prepped with alcohol. A penile block was performed with a 50-50 mixture of 1% lidocaine without epinephrine and 0.5% bupivacaine. Patient tolerated this procedure well with minimal pain and no bleeding.   Then, Over the course of the next 2 hours I provided patient with a total of 500 mcg of intracavernosal phenylephrine in each side of the penis totaling 1 mg. Patient tolerated this well with no pain and minimal bleeding    At this point Dr. Baltazar Anderson came to the emergency department and evaluated the patient. He recommended transfusion as well as his own procedure at the bedside. He then performed his own procedure which was successful. Since his procedure was successful the patient no longer required transfusion and I canceled the transfusion. I advised patient to return to the ER for new or worsening symptoms    CT CHEST PULMONARY EMBOLISM W CONTRAST  1. No acute abnormality.    Results for orders placed or performed during the hospital encounter of 03/17/22   CBC with Auto Differential   Result Value Ref Range    WBC 12.8 (H) 4.0 - 11.0 K/uL    RBC 2.16 (L) 4.20 - 5.90 M/uL    Hemoglobin 7.3 (L) 13.5 - 17.5 g/dL    Hematocrit 20.6 (LL) 40.5 - 52.5 %    MCV 95.0 80.0 - 100.0 fL    MCH 33.7 26.0 - 34.0 pg    MCHC 35.5 31.0 - 36.0 g/dL    RDW 24.3 (H) 12.4 - 15.4 %    Platelets 162 549 - 107 K/uL    MPV 8.5 5.0 - 10.5 fL    PLATELET SLIDE REVIEW Adequate     SLIDE REVIEW see below     Path Consult No     Neutrophils % 66.0 %    Lymphocytes % 23.0 %    Monocytes % 6.0 %    Eosinophils % 3.0 %    Basophils % 0.0 %    Neutrophils Absolute 8.6 (H) 1.7 - 7.7 K/uL    Lymphocytes Absolute 3.1 1.0 - 5.1 K/uL    Monocytes Absolute 0.8 0.0 - 1.3 K/uL    Eosinophils Absolute 0.4 0.0 - 0.6 K/uL    Basophils Absolute 0.0 0.0 - 0.2 K/uL    Bands Relative 1 0 - 7 %    Atypical Lymphocytes Relative 1 0 - 6 %    nRBC 7 (A) /100 WBC    Anisocytosis 2+ (A)     Macrocytes 1+ (A)     Microcytes Occasional (A)     Polychromasia 1+ (A)     Poikilocytes 3+ (A)     Schistocytes Occasional (A)     Ovalocytes 1+ (A)     Stomatocytes Occasional (A)     Basophilic Stippling Occasional (A)     Sickle Cells 1+ (A)    Comprehensive Metabolic Panel w/ Reflex to MG   Result Value Ref Range    Sodium 139 136 - 145 mmol/L    Potassium reflex Magnesium 4.0 3.5 - 5.1 mmol/L Chloride 106 99 - 110 mmol/L    CO2 26 21 - 32 mmol/L    Anion Gap 7 3 - 16    Glucose 108 (H) 70 - 99 mg/dL    BUN 9 7 - 20 mg/dL    CREATININE 0.7 (L) 0.9 - 1.3 mg/dL    GFR Non-African American >60 >60    GFR African American >60 >60    Calcium 9.2 8.3 - 10.6 mg/dL    Total Protein 6.8 6.4 - 8.2 g/dL    Albumin 4.6 3.4 - 5.0 g/dL    Albumin/Globulin Ratio 2.1 1.1 - 2.2    Total Bilirubin 5.6 (H) 0.0 - 1.0 mg/dL    Alkaline Phosphatase 98 40 - 129 U/L    ALT 19 10 - 40 U/L    AST 39 (H) 15 - 37 U/L   Reticulocytes   Result Value Ref Range    Retic Ct Pct 17.21 (H) 0.50 - 2.18 %    Retic Ct Abs 0.369 M/uL    Immature Retic Fract 0.72 (H) 0.21 - 0.37   TYPE AND SCREEN   Result Value Ref Range    ABO/Rh B POS     Antibody Screen NEG          I estimate there is LOW risk for ACUTE CORONARY SYNDROME, CHRONIC OBSTRUCTIVE PULMONARY DISEASE, CONGESTIVE HEART FAILURE, PERICARDIAL TAMPONADE, PNEUMONIA, PNEUMOTHORAX, PULMONARY EMBOLISM, SEPSIS, and THORACIC DISSECTION,  thus I consider the discharge disposition reasonable. Oswald Rosales and I have discussed the diagnosis and risks, and we agree with discharging home to follow-up with their primary doctor. We also discussed returning to the Emergency Department immediately if new or worsening symptoms occur. We have discussed the symptoms which are most concerning (e.g., bloody sputum, fever, worsening pain or shortness of breath, vomiting) that necessitate immediate return. CLINICAL IMPRESSION:  1. Sickle cell pain crisis ([facility] [address]    2.  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

## 2022-03-17 NOTE — ED NOTES
[facility] Per Lorin Andres called and spoke to Dr. Laruth Schwab Dr. Magda Chihuahua called and spoke to Dr. Jd Rosenberg

## 2022-03-17 NOTE — ED NOTES
Patient is maintaining O2 sats of 92% on RA. Dr. Sukumar Hidalgo was updated.       Ruth Dye RN  03/17/22 5400

## 2022-03-17 NOTE — PROCEDURES
Priapism Takedown     RBA discussed with patient  Patient placed on monitor  Phenylephrine 500mcg/ml ordered from pharmacy  Time out done  Lidocaine 1% 2cc into left mid shaft   0.5cc phenylX2 given   Resolution of priapism      Monitor for next 1hr  See me in few weeks     Josephine Yeager MD  [facility]  Office - [phone]

## 2022-03-17 NOTE — CONSULTS
Urology Attending Consult Note      Reason for Consultation: priapism    History: This is a 24 y.o. male who presented with hypoxia. After CT was done and was normal and IV was out, the patient reported priapism since 4am. Urology is consulted for evaluation. Urologic history is pertinent for priapism takedown in OR at age 4 per pt. Dr Tiffanie Joshi tried phenylephrine 100mcg/ml with about 30-40% resolution     Imaging: na    Family History, Social History, Review of Systems:  Reviewed and agreed to as per chart    Vitals:  /64   Pulse 66   Temp 97.6 °F (36.4 °C) (Oral)   Resp 16   Ht 5' 8\" (1.727 m)   Wt 190 lb (86.2 kg)   SpO2 100%   BMI 28.89 kg/m²   Temp  Av.6 °F (36.4 °C)  Min: 97.2 °F (36.2 °C)  Max: 97.9 °F (36.6 °C)    Intake/Output Summary (Last 24 hours) at 3/17/2022 1543  Last data filed at 3/17/2022 0238  Gross per 24 hour   Intake 1000 ml   Output --   Net 1000 ml         Physical:   Well developed, well nourished in no acute distress   Mood indicates no abnormalities. Pt doesnt appear depressed   Orientated to time and place   Neck is supple, trachea is midline   Respiratory effort is normal   Cardiovascular show no extremity swelling   Abdomen no masses or hernias are palpated, there is no tenderness. Liver and Spleen appear normal.   Skin show no abnormal lesions   Lymph nodes are not palpated in the inguinal, neck, or axillary area.   Priapism - stuttering   Some pain  Able to bend somewhat but still 60% rigid    Labs:  WBC:    Lab Results   Component Value Date    WBC 12.8 2022     Hemoglobin/Hematocrit:    Lab Results   Component Value Date    HGB 7.3 2022    HCT 20.6 2022     BMP:    Lab Results   Component Value Date     2022    K 4.0 2022     2022    CO2 26 2022    BUN 9 2022    LABALBU 4.6 2022    CREATININE 0.7 2022    CALCIUM 9.2 2022    GFRAA >60 2022    LABGLOM >60 2022 PT/INR:    Lab Results   Component Value Date    PROTIME 14.4 09/28/2021    INR 1.26 09/28/2021     PTT:    Lab Results   Component Value Date    APTT 43.5 10/19/2021   [APTT        Impression/Plan: 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 ml given - 0.5ml and another 0.5ml with resolution of priapism  -- continue supplemental O2, consider transfusion since Hgb 7.3, some evidence that Hgb >10 helps   -- see me in office in a few weeks, will discuss emerging treatments for stuttering priapism     Thank you for the opportunity to be involved in the care of this patient - please call with questions    Juan Pandey MD  [facility]  Office - [phone]

## 2022-03-24 ENCOUNTER — HOSPITAL ENCOUNTER (INPATIENT)
Age: 22
LOS: 3 days | Discharge: HOME | DRG: 872 | End: 2022-03-28
Attending: EMERGENCY MEDICINE | Admitting: INTERNAL MEDICINE
Payer: COMMERCIAL

## 2022-03-24 DIAGNOSIS — K04.7 DENTAL INFECTION: ICD-10-CM

## 2022-03-24 DIAGNOSIS — D57.00 SICKLE CELL PAIN CRISIS (HCC): Primary | ICD-10-CM

## 2022-03-24 PROCEDURE — 96376 TX/PRO/DX INJ SAME DRUG ADON: CPT

## 2022-03-24 PROCEDURE — 96365 THER/PROPH/DIAG IV INF INIT: CPT

## 2022-03-24 PROCEDURE — 96375 TX/PRO/DX INJ NEW DRUG ADDON: CPT

## 2022-03-24 PROCEDURE — 99285 EMERGENCY DEPT VISIT HI MDM: CPT

## 2022-03-24 RX ORDER — ONDANSETRON 2 MG/ML
4 INJECTION INTRAMUSCULAR; INTRAVENOUS ONCE
Status: COMPLETED | OUTPATIENT
Start: 2022-03-25 | End: 2022-03-25

## 2022-03-24 RX ORDER — SODIUM CHLORIDE, SODIUM LACTATE, POTASSIUM CHLORIDE, AND CALCIUM CHLORIDE .6; .31; .03; .02 G/100ML; G/100ML; G/100ML; G/100ML
1000 INJECTION, SOLUTION INTRAVENOUS ONCE
Status: COMPLETED | OUTPATIENT
Start: 2022-03-25 | End: 2022-03-25

## 2022-03-24 RX ORDER — CLINDAMYCIN PHOSPHATE 600 MG/50ML
600 INJECTION INTRAVENOUS ONCE
Status: COMPLETED | OUTPATIENT
Start: 2022-03-25 | End: 2022-03-25

## 2022-03-24 RX ORDER — HYDROMORPHONE HCL 110MG/55ML
1.5 PATIENT CONTROLLED ANALGESIA SYRINGE INTRAVENOUS ONCE
Status: COMPLETED | OUTPATIENT
Start: 2022-03-25 | End: 2022-03-25

## 2022-03-24 ASSESSMENT — PAIN SCALES - GENERAL: PAINLEVEL_OUTOF10: 9

## 2022-03-24 ASSESSMENT — PAIN DESCRIPTION - PAIN TYPE: TYPE: ACUTE PAIN;CHRONIC PAIN

## 2022-03-24 ASSESSMENT — PAIN DESCRIPTION - LOCATION: LOCATION: BACK;FACE

## 2022-03-24 ASSESSMENT — PAIN - FUNCTIONAL ASSESSMENT: PAIN_FUNCTIONAL_ASSESSMENT: 0-10

## 2022-03-24 NOTE — LETTER
[facility] TELE/MED [facility]  [address] [place] [address]  Phone: [phone]          March 28, 2022     Patient: [facility]   YOB: 2000   Date of Visit: 3/24/2022       To Whom It May Concern: It is my medical opinion that [facility] may return to work on Wednesday, March 30, 2022. If you have any questions or concerns, please don't hesitate to call.     Sincerely,        SHAKEEL Bryant MD

## 2022-03-25 ENCOUNTER — APPOINTMENT (OUTPATIENT)
Dept: CT IMAGING | Age: 22
DRG: 872 | End: 2022-03-25
Payer: COMMERCIAL

## 2022-03-25 PROBLEM — D57.1 SICKLE CELL ANEMIA (HCC): Status: ACTIVE | Noted: 2020-07-28

## 2022-03-25 PROBLEM — D57.00 SICKLE CELL CRISIS (HCC): Status: ACTIVE | Noted: 2022-03-25

## 2022-03-25 PROBLEM — K04.7 DENTAL INFECTION: Status: ACTIVE | Noted: 2022-03-25

## 2022-03-25 LAB
A/G RATIO: 1.9 (ref 1.1–2.2)
ALBUMIN SERPL-MCNC: 4.9 G/DL (ref 3.4–5)
ALP BLD-CCNC: 96 U/L (ref 40–129)
ALT SERPL-CCNC: 14 U/L (ref 10–40)
ANION GAP SERPL CALCULATED.3IONS-SCNC: 11 MMOL/L (ref 3–16)
ANISOCYTOSIS: ABNORMAL
AST SERPL-CCNC: 38 U/L (ref 15–37)
BASOPHILIC STIPPLING: ABNORMAL
BASOPHILS ABSOLUTE: 0 K/UL (ref 0–0.2)
BASOPHILS RELATIVE PERCENT: 0 %
BILIRUB SERPL-MCNC: 6.5 MG/DL (ref 0–1)
BUN BLDV-MCNC: 8 MG/DL (ref 7–20)
CALCIUM SERPL-MCNC: 9.5 MG/DL (ref 8.3–10.6)
CHLORIDE BLD-SCNC: 104 MMOL/L (ref 99–110)
CO2: 25 MMOL/L (ref 21–32)
CREAT SERPL-MCNC: 0.8 MG/DL (ref 0.9–1.3)
EOSINOPHILS ABSOLUTE: 0 K/UL (ref 0–0.6)
EOSINOPHILS RELATIVE PERCENT: 0 %
GFR AFRICAN AMERICAN: >60
GFR NON-AFRICAN AMERICAN: >60
GLUCOSE BLD-MCNC: 93 MG/DL (ref 70–99)
HCT VFR BLD CALC: 22 % (ref 40.5–52.5)
HEMATOLOGY PATH CONSULT: NO
HEMOGLOBIN: 7.8 G/DL (ref 13.5–17.5)
IMMATURE RETIC FRACT: 0.7 (ref 0.21–0.37)
LACTIC ACID: 0.8 MMOL/L (ref 0.4–2)
LYMPHOCYTES ABSOLUTE: 3.5 K/UL (ref 1–5.1)
LYMPHOCYTES RELATIVE PERCENT: 17 %
MACROCYTES: ABNORMAL
MCH RBC QN AUTO: 33.3 PG (ref 26–34)
MCHC RBC AUTO-ENTMCNC: 35.6 G/DL (ref 31–36)
MCV RBC AUTO: 93.7 FL (ref 80–100)
MONOCYTES ABSOLUTE: 1.8 K/UL (ref 0–1.3)
MONOCYTES RELATIVE PERCENT: 9 %
NEUTROPHILS ABSOLUTE: 15.2 K/UL (ref 1.7–7.7)
NEUTROPHILS RELATIVE PERCENT: 74 %
NUCLEATED RED BLOOD CELLS: 1 /100 WBC
OVALOCYTES: ABNORMAL
PDW BLD-RTO: 25.6 % (ref 12.4–15.4)
PLATELET # BLD: 471 K/UL (ref 135–450)
PLATELET SLIDE REVIEW: ABNORMAL
PMV BLD AUTO: 8.1 FL (ref 5–10.5)
POIKILOCYTES: ABNORMAL
POLYCHROMASIA: ABNORMAL
POTASSIUM REFLEX MAGNESIUM: 4.4 MMOL/L (ref 3.5–5.1)
PROCALCITONIN: 0.12 NG/ML (ref 0–0.15)
RBC # BLD: 2.35 M/UL (ref 4.2–5.9)
RETICULOCYTE ABSOLUTE COUNT: 0.37 M/UL
RETICULOCYTE COUNT PCT: 15.61 % (ref 0.5–2.18)
SICKLE CELLS: ABNORMAL
SODIUM BLD-SCNC: 140 MMOL/L (ref 136–145)
STOMATOCYTES: ABNORMAL
TARGET CELLS: ABNORMAL
TOTAL PROTEIN: 7.5 G/DL (ref 6.4–8.2)
WBC # BLD: 20.5 K/UL (ref 4–11)

## 2022-03-25 PROCEDURE — 2500000003 HC RX 250 WO HCPCS: Performed by: EMERGENCY MEDICINE

## 2022-03-25 PROCEDURE — 6360000002 HC RX W HCPCS: Performed by: NURSE PRACTITIONER

## 2022-03-25 PROCEDURE — 6370000000 HC RX 637 (ALT 250 FOR IP): Performed by: NURSE PRACTITIONER

## 2022-03-25 PROCEDURE — 6360000002 HC RX W HCPCS: Performed by: INTERNAL MEDICINE

## 2022-03-25 PROCEDURE — 2580000003 HC RX 258: Performed by: EMERGENCY MEDICINE

## 2022-03-25 PROCEDURE — 6360000002 HC RX W HCPCS: Performed by: EMERGENCY MEDICINE

## 2022-03-25 PROCEDURE — 6370000000 HC RX 637 (ALT 250 FOR IP): Performed by: PHYSICIAN ASSISTANT

## 2022-03-25 PROCEDURE — 6370000000 HC RX 637 (ALT 250 FOR IP): Performed by: INTERNAL MEDICINE

## 2022-03-25 PROCEDURE — 6360000002 HC RX W HCPCS: Performed by: PHYSICIAN ASSISTANT

## 2022-03-25 PROCEDURE — 6370000000 HC RX 637 (ALT 250 FOR IP): Performed by: EMERGENCY MEDICINE

## 2022-03-25 PROCEDURE — 85025 COMPLETE CBC W/AUTO DIFF WBC: CPT

## 2022-03-25 PROCEDURE — 85045 AUTOMATED RETICULOCYTE COUNT: CPT

## 2022-03-25 PROCEDURE — 80053 COMPREHEN METABOLIC PANEL: CPT

## 2022-03-25 PROCEDURE — 2500000003 HC RX 250 WO HCPCS: Performed by: NURSE PRACTITIONER

## 2022-03-25 PROCEDURE — 1200000000 HC SEMI PRIVATE

## 2022-03-25 PROCEDURE — 84145 PROCALCITONIN (PCT): CPT

## 2022-03-25 PROCEDURE — 2580000003 HC RX 258: Performed by: NURSE PRACTITIONER

## 2022-03-25 PROCEDURE — 70486 CT MAXILLOFACIAL W/O DYE: CPT

## 2022-03-25 PROCEDURE — 83605 ASSAY OF LACTIC ACID: CPT

## 2022-03-25 PROCEDURE — 87040 BLOOD CULTURE FOR BACTERIA: CPT

## 2022-03-25 RX ORDER — HYDROMORPHONE HCL 110MG/55ML
0.5 PATIENT CONTROLLED ANALGESIA SYRINGE INTRAVENOUS
Status: DISCONTINUED | OUTPATIENT
Start: 2022-03-25 | End: 2022-03-25

## 2022-03-25 RX ORDER — POLYETHYLENE GLYCOL 3350 17 G/17G
17 POWDER, FOR SOLUTION ORAL DAILY PRN
Status: DISCONTINUED | OUTPATIENT
Start: 2022-03-25 | End: 2022-03-28 | Stop reason: HOSPADM

## 2022-03-25 RX ORDER — ACETAMINOPHEN 325 MG/1
650 TABLET ORAL ONCE
Status: COMPLETED | OUTPATIENT
Start: 2022-03-25 | End: 2022-03-25

## 2022-03-25 RX ORDER — PANTOPRAZOLE SODIUM 40 MG/1
40 TABLET, DELAYED RELEASE ORAL
Status: DISCONTINUED | OUTPATIENT
Start: 2022-03-25 | End: 2022-03-28 | Stop reason: HOSPADM

## 2022-03-25 RX ORDER — ACETAMINOPHEN 650 MG/1
650 SUPPOSITORY RECTAL EVERY 6 HOURS PRN
Status: DISCONTINUED | OUTPATIENT
Start: 2022-03-25 | End: 2022-03-28 | Stop reason: HOSPADM

## 2022-03-25 RX ORDER — ONDANSETRON 4 MG/1
4 TABLET, ORALLY DISINTEGRATING ORAL EVERY 8 HOURS PRN
Status: DISCONTINUED | OUTPATIENT
Start: 2022-03-25 | End: 2022-03-28 | Stop reason: HOSPADM

## 2022-03-25 RX ORDER — SODIUM CHLORIDE 9 MG/ML
25 INJECTION, SOLUTION INTRAVENOUS PRN
Status: DISCONTINUED | OUTPATIENT
Start: 2022-03-25 | End: 2022-03-28 | Stop reason: HOSPADM

## 2022-03-25 RX ORDER — SODIUM CHLORIDE 0.9 % (FLUSH) 0.9 %
5-40 SYRINGE (ML) INJECTION EVERY 12 HOURS SCHEDULED
Status: DISCONTINUED | OUTPATIENT
Start: 2022-03-25 | End: 2022-03-28 | Stop reason: HOSPADM

## 2022-03-25 RX ORDER — HYDROMORPHONE HCL 110MG/55ML
2 PATIENT CONTROLLED ANALGESIA SYRINGE INTRAVENOUS
Status: DISCONTINUED | OUTPATIENT
Start: 2022-03-25 | End: 2022-03-28 | Stop reason: HOSPADM

## 2022-03-25 RX ORDER — KETOROLAC TROMETHAMINE 30 MG/ML
15 INJECTION, SOLUTION INTRAMUSCULAR; INTRAVENOUS ONCE
Status: COMPLETED | OUTPATIENT
Start: 2022-03-25 | End: 2022-03-25

## 2022-03-25 RX ORDER — ALBUTEROL SULFATE 90 UG/1
2 AEROSOL, METERED RESPIRATORY (INHALATION) EVERY 4 HOURS PRN
Status: DISCONTINUED | OUTPATIENT
Start: 2022-03-25 | End: 2022-03-28 | Stop reason: HOSPADM

## 2022-03-25 RX ORDER — OXYCODONE HYDROCHLORIDE 5 MG/1
10 TABLET ORAL EVERY 4 HOURS PRN
Status: DISCONTINUED | OUTPATIENT
Start: 2022-03-25 | End: 2022-03-25

## 2022-03-25 RX ORDER — SODIUM CHLORIDE 0.9 % (FLUSH) 0.9 %
5-40 SYRINGE (ML) INJECTION PRN
Status: DISCONTINUED | OUTPATIENT
Start: 2022-03-25 | End: 2022-03-28 | Stop reason: HOSPADM

## 2022-03-25 RX ORDER — ONDANSETRON 2 MG/ML
4 INJECTION INTRAMUSCULAR; INTRAVENOUS EVERY 6 HOURS PRN
Status: DISCONTINUED | OUTPATIENT
Start: 2022-03-25 | End: 2022-03-28 | Stop reason: HOSPADM

## 2022-03-25 RX ORDER — HYDROXYUREA 500 MG/1
1000 CAPSULE ORAL DAILY
Status: DISCONTINUED | OUTPATIENT
Start: 2022-03-25 | End: 2022-03-28 | Stop reason: HOSPADM

## 2022-03-25 RX ORDER — LEVALBUTEROL 1.25 MG/.5ML
0.31 SOLUTION, CONCENTRATE RESPIRATORY (INHALATION) EVERY 4 HOURS PRN
Status: DISCONTINUED | OUTPATIENT
Start: 2022-03-25 | End: 2022-03-28 | Stop reason: HOSPADM

## 2022-03-25 RX ORDER — HYDROMORPHONE HCL 110MG/55ML
0.25 PATIENT CONTROLLED ANALGESIA SYRINGE INTRAVENOUS
Status: DISCONTINUED | OUTPATIENT
Start: 2022-03-25 | End: 2022-03-25

## 2022-03-25 RX ORDER — ACETAMINOPHEN 325 MG/1
650 TABLET ORAL EVERY 6 HOURS PRN
Status: DISCONTINUED | OUTPATIENT
Start: 2022-03-25 | End: 2022-03-28 | Stop reason: HOSPADM

## 2022-03-25 RX ORDER — FOLIC ACID 1 MG/1
2 TABLET ORAL DAILY
Status: DISCONTINUED | OUTPATIENT
Start: 2022-03-25 | End: 2022-03-28 | Stop reason: HOSPADM

## 2022-03-25 RX ORDER — CLINDAMYCIN PHOSPHATE 600 MG/50ML
600 INJECTION INTRAVENOUS EVERY 8 HOURS
Status: DISCONTINUED | OUTPATIENT
Start: 2022-03-25 | End: 2022-03-27

## 2022-03-25 RX ORDER — SODIUM CHLORIDE 9 MG/ML
INJECTION, SOLUTION INTRAVENOUS CONTINUOUS
Status: DISCONTINUED | OUTPATIENT
Start: 2022-03-25 | End: 2022-03-28 | Stop reason: HOSPADM

## 2022-03-25 RX ORDER — HYDROMORPHONE HCL 110MG/55ML
1 PATIENT CONTROLLED ANALGESIA SYRINGE INTRAVENOUS
Status: DISCONTINUED | OUTPATIENT
Start: 2022-03-25 | End: 2022-03-28 | Stop reason: HOSPADM

## 2022-03-25 RX ADMIN — KETOROLAC TROMETHAMINE 15 MG: 30 INJECTION, SOLUTION INTRAMUSCULAR; INTRAVENOUS at 00:35

## 2022-03-25 RX ADMIN — OXYCODONE 10 MG: 5 TABLET ORAL at 06:18

## 2022-03-25 RX ADMIN — APIXABAN 5 MG: 5 TABLET, FILM COATED ORAL at 21:34

## 2022-03-25 RX ADMIN — ACETAMINOPHEN 650 MG: 325 TABLET ORAL at 00:36

## 2022-03-25 RX ADMIN — HYDROMORPHONE HYDROCHLORIDE 0.5 MG: 2 INJECTION, SOLUTION INTRAMUSCULAR; INTRAVENOUS; SUBCUTANEOUS at 09:07

## 2022-03-25 RX ADMIN — FOLIC ACID 2 MG: 1 TABLET ORAL at 09:07

## 2022-03-25 RX ADMIN — OXYCODONE 10 MG: 5 TABLET ORAL at 16:41

## 2022-03-25 RX ADMIN — CLINDAMYCIN PHOSPHATE 600 MG: 600 INJECTION, SOLUTION INTRAVENOUS at 19:01

## 2022-03-25 RX ADMIN — SODIUM CHLORIDE, PRESERVATIVE FREE 10 ML: 5 INJECTION INTRAVENOUS at 09:09

## 2022-03-25 RX ADMIN — HYDROMORPHONE HYDROCHLORIDE 0.5 MG: 2 INJECTION, SOLUTION INTRAMUSCULAR; INTRAVENOUS; SUBCUTANEOUS at 15:31

## 2022-03-25 RX ADMIN — ONDANSETRON 4 MG: 2 INJECTION INTRAMUSCULAR; INTRAVENOUS at 00:36

## 2022-03-25 RX ADMIN — HYDROMORPHONE HYDROCHLORIDE 2 MG: 2 INJECTION, SOLUTION INTRAMUSCULAR; INTRAVENOUS; SUBCUTANEOUS at 21:34

## 2022-03-25 RX ADMIN — APIXABAN 5 MG: 5 TABLET, FILM COATED ORAL at 09:07

## 2022-03-25 RX ADMIN — OXYCODONE HYDROCHLORIDE 20 MG: 15 TABLET ORAL at 19:51

## 2022-03-25 RX ADMIN — PENICILLIN V POTASSIUM 500 MG: 250 POWDER, FOR SOLUTION ORAL at 23:26

## 2022-03-25 RX ADMIN — HYDROMORPHONE HYDROCHLORIDE 1 MG: 1 INJECTION, SOLUTION INTRAMUSCULAR; INTRAVENOUS; SUBCUTANEOUS at 01:46

## 2022-03-25 RX ADMIN — HYDROMORPHONE HYDROCHLORIDE 0.5 MG: 2 INJECTION, SOLUTION INTRAMUSCULAR; INTRAVENOUS; SUBCUTANEOUS at 12:18

## 2022-03-25 RX ADMIN — HYDROMORPHONE HYDROCHLORIDE 0.5 MG: 1 INJECTION, SOLUTION INTRAMUSCULAR; INTRAVENOUS; SUBCUTANEOUS at 05:34

## 2022-03-25 RX ADMIN — OXYCODONE 10 MG: 5 TABLET ORAL at 10:39

## 2022-03-25 RX ADMIN — SODIUM CHLORIDE 1000 ML: 9 INJECTION, SOLUTION INTRAVENOUS at 06:22

## 2022-03-25 RX ADMIN — CLINDAMYCIN PHOSPHATE 600 MG: 600 INJECTION, SOLUTION INTRAVENOUS at 10:41

## 2022-03-25 RX ADMIN — SODIUM CHLORIDE, PRESERVATIVE FREE 5 ML: 5 INJECTION INTRAVENOUS at 21:54

## 2022-03-25 RX ADMIN — CLINDAMYCIN PHOSPHATE 600 MG: 600 INJECTION, SOLUTION INTRAVENOUS at 01:45

## 2022-03-25 RX ADMIN — PENICILLIN V POTASSIUM 500 MG: 250 POWDER, FOR SOLUTION ORAL at 19:02

## 2022-03-25 RX ADMIN — SODIUM CHLORIDE, POTASSIUM CHLORIDE, SODIUM LACTATE AND CALCIUM CHLORIDE 1000 ML: 600; 310; 30; 20 INJECTION, SOLUTION INTRAVENOUS at 00:39

## 2022-03-25 RX ADMIN — PANTOPRAZOLE SODIUM 40 MG: 40 TABLET, DELAYED RELEASE ORAL at 06:18

## 2022-03-25 RX ADMIN — SODIUM CHLORIDE: 9 INJECTION, SOLUTION INTRAVENOUS at 19:53

## 2022-03-25 RX ADMIN — HYDROMORPHONE HYDROCHLORIDE 1.5 MG: 2 INJECTION, SOLUTION INTRAMUSCULAR; INTRAVENOUS; SUBCUTANEOUS at 00:36

## 2022-03-25 RX ADMIN — HYDROMORPHONE HYDROCHLORIDE 1 MG: 2 INJECTION, SOLUTION INTRAMUSCULAR; INTRAVENOUS; SUBCUTANEOUS at 18:30

## 2022-03-25 RX ADMIN — OXYCODONE HYDROCHLORIDE 20 MG: 15 TABLET ORAL at 23:26

## 2022-03-25 ASSESSMENT — PAIN SCALES - GENERAL
PAINLEVEL_OUTOF10: 9
PAINLEVEL_OUTOF10: 7
PAINLEVEL_OUTOF10: 8
PAINLEVEL_OUTOF10: 10
PAINLEVEL_OUTOF10: 10
PAINLEVEL_OUTOF10: 9
PAINLEVEL_OUTOF10: 9
PAINLEVEL_OUTOF10: 8
PAINLEVEL_OUTOF10: 9
PAINLEVEL_OUTOF10: 9
PAINLEVEL_OUTOF10: 7
PAINLEVEL_OUTOF10: 9
PAINLEVEL_OUTOF10: 9

## 2022-03-25 ASSESSMENT — PAIN DESCRIPTION - PAIN TYPE
TYPE: CHRONIC PAIN
TYPE: CHRONIC PAIN;ACUTE PAIN
TYPE: ACUTE PAIN;CHRONIC PAIN
TYPE: CHRONIC PAIN
TYPE: CHRONIC PAIN
TYPE: CHRONIC PAIN;ACUTE PAIN

## 2022-03-25 ASSESSMENT — PAIN DESCRIPTION - LOCATION
LOCATION: BACK;FACE

## 2022-03-25 ASSESSMENT — PAIN DESCRIPTION - FREQUENCY: FREQUENCY: CONTINUOUS

## 2022-03-25 ASSESSMENT — PAIN DESCRIPTION - PROGRESSION: CLINICAL_PROGRESSION: GRADUALLY WORSENING

## 2022-03-25 ASSESSMENT — PAIN DESCRIPTION - DESCRIPTORS: DESCRIPTORS: ACHING;SHARP

## 2022-03-25 ASSESSMENT — LIFESTYLE VARIABLES
HOW OFTEN DO YOU HAVE A DRINK CONTAINING ALCOHOL: NEVER
HOW OFTEN DO YOU HAVE A DRINK CONTAINING ALCOHOL: NEVER

## 2022-03-25 ASSESSMENT — PAIN DESCRIPTION - ORIENTATION: ORIENTATION: LEFT;LOWER

## 2022-03-25 NOTE — PROGRESS NOTES
Patient refusing to place non skid socks on and full assessment. Patient denies any open areas/wounds on body. Bed in lowest locked position, call light in reach.

## 2022-03-25 NOTE — CONSULTS
[facility]    Consult Note      Requesting Physician:  Dr. Paredes:  Tooth abscess        HISTORY OF PRESENT ILLNESS:      Mr. Bibiana Jefferson  is a 79-year-old -American male who was admitted due to to swelling in the left jaw. He states that his teeth do not hurt as bad as touching the outside of his face. He denies fever or chills. He states that he woke up like this. He was seen in the emergency department earlier in the week for a priapism, but this is resolved.       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)     Sickle cell anemia (HCC)      Past Surgical History:    Past Surgical History:   Procedure Laterality Date    SPLENECTOMY         Current Medications:    Current Facility-Administered Medications   Medication Dose Route Frequency Provider Last Rate Last Admin    clindamycin (CLEOCIN) 600 mg in dextrose 5 % 50 mL IVPB  600 mg IntraVENous Q8H Candance Silvius, APRN - CNP        apixaban (ELIQUIS) tablet 5 mg  5 mg Oral BID Candance Silvius, APRN - CNP        folic acid (FOLVITE) tablet 2 mg  2 mg Oral Daily Candance Silvius, APRN - CNP        hydroxyurea (HYDREA) chemo capsule 1,000 mg  1,000 mg Oral Daily Candance Silvius, APRN - CNP        levalbuterol (XOPENEX) nebulizer solution 0.3 mg  0.3 mg Nebulization Q4H PRN Candance Silvius, APRN - CNP        oxyCODONE (ROXICODONE) immediate release tablet 10 mg  10 mg Oral Q4H PRN Candance Silvius, APRN - CNP   10 mg at 03/25/22 0618    pantoprazole (PROTONIX) tablet 40 mg  40 mg Oral QAM AC Candance Silvius, APRN - CNP   40 mg at 03/25/22 0618    albuterol sulfate  (90 Base) MCG/ACT inhaler 2 puff  2 puff Inhalation Q4H PRN Candance Silvius, APRN - CNP        sodium chloride flush 0.9 % injection 5-40 mL  5-40 mL IntraVENous 2 times per day Marinell Pepper, APRN - CNP        sodium chloride flush 0.9 % injection 5-40 mL  5-40 mL IntraVENous PRN Marinell Pepper, APRN - CNP        0.9 % sodium chloride infusion  25 mL IntraVENous PRN Marinell Pepper, APRN - CNP        ondansetron (ZOFRAN-ODT) disintegrating tablet 4 mg  4 mg Oral Q8H PRN Marinell Pepper, APRN - CNP        Or    ondansetron (ZOFRAN) injection 4 mg  4 mg IntraVENous Q6H PRN Marinell Pepper, APRN - CNP        polyethylene glycol (GLYCOLAX) packet 17 g  17 g Oral Daily PRN Marinell Pepper, APRN - CNP        acetaminophen (TYLENOL) tablet 650 mg  650 mg Oral Q6H PRN Marinell Pepper, APRN - CNP        Or    acetaminophen (TYLENOL) suppository 650 mg  650 mg Rectal Q6H PRN Marinell Pepper, APRN - CNP        0.9 % sodium chloride infusion   IntraVENous Continuous Marinell Pepper, APRN - CNP 75 mL/hr at 03/25/22 0622 1,000 mL at 03/25/22 0622    HYDROmorphone (DILAUDID) injection 0.25 mg  0.25 mg IntraVENous Q3H PRN Marinell Pepper, APRN - CNP        Or    HYDROmorphone (DILAUDID) injection 0.5 mg  0.5 mg IntraVENous Q3H PRN Marinell Pepper, APRN - CNP   0.5 mg at 03/25/22 0534     Allergies:     Allergies   Allergen Reactions    Morphine Shortness Of Breath     Other reaction(s): Histamine-like Reaction  Has asthma exacerbation with morphine-histamine type reaction         Social History:   Social History     Socioeconomic History    Marital status: Single     Spouse name: Not on file    Number of children: 0    Years of education: Not on file    Highest education level: Not on file   Occupational History    Not on file   Tobacco Use    Smoking status: Never Smoker    Smokeless tobacco: Never Used   Vaping Use    Vaping Use: Never used   Substance and Sexual Activity    Alcohol use: Not Currently    Drug use: Never    Sexual activity: Not Currently   Other Topics Concern    Not on file   Social History Narrative    Not on file     Social Determinants of Health     Financial Resource Strain:     Difficulty of Paying Living Expenses: Not on file   Food Insecurity:     Worried About Running Out of Food in the Last Year: Not on file    Laura of Food in the Last Year: Not on file   Transportation Needs:     Lack of Transportation (Medical): Not on file    Lack of Transportation (Non-Medical): Not on file   Physical Activity:     Days of Exercise per Week: Not on file    Minutes of Exercise per Session: Not on file   Stress:     Feeling of Stress : Not on file   Social Connections:     Frequency of Communication with Friends and Family: Not on file    Frequency of Social Gatherings with Friends and Family: Not on file    Attends Worship Services: Not on file    Active Member of [address] [place] [address] [employer] or Organizations: Not on file    Attends Club or Organization Meetings: Not on file    Marital Status: Not on file   Intimate Partner Violence:     Fear of Current or Ex-Partner: Not on file    Emotionally Abused: Not on file    Physically Abused: Not on file    Sexually Abused: Not on file   Housing Stability:     Unable to Pay for Housing in the Last Year: Not on file    Number of [place] in the Last Year: Not on file    Unstable Housing in the Last Year: Not on file          Family History:     Family History   Problem Relation Age of Onset    Other Father         sarcoidosis     REVIEW OF SYSTEMS:      Constitutional:  No weight loss, No fever, No chills, No night sweats. Energy level good.   Eyes:  No impairment or change in vision  ENT / Mouth: See above  Cardiovascular:  No chest pain, palpitations, new edema, or calf discomfort  Respiratory:  No pain, hemoptysis, change to breathing  Breast:  No pain, discharge, change in appearance or texture  Gastrointestinal:  No pain, cramping, jaundice, change to eating and bowel habits  Urinary:  No pain, bleeding or change in continence  Genitalia: No pain, MG 2.10 01/16/2022     Phosphorus:  No components found for: PO4  Calcium:  No results found for: CA  CBC:    Lab Results   Component Value Date    WBC 20.5 03/25/2022    RBC 2.35 03/25/2022    HGB 7.8 03/25/2022    HCT 22.0 03/25/2022    MCV 93.7 03/25/2022    RDW 25.6 03/25/2022     03/25/2022     DIFF:  Lab Results   Component Value Date    MCV 93.7 03/25/2022    RDW 25.6 03/25/2022      [email or web address]  Uric Acid:  @[email or web address](URIC)@    Radiology Review:          Problem List  Patient Active Problem List   Diagnosis    [facility]ick[facility] pain crisis ([facility] [address]    Asthma    Sickle cell anemia (HCC)    Sepsis ([facility] [address]    Opiate overdose ([facility] [address]    Opiate antagonist poisoning, accidental or unintentional, initial encounter    Leukocytosis    Hypoxia    Anemia    Other chest pain    Pneumonia due to infectious organism    Fever    Chronic prescription opiate use    Right leg pain    Dental infection    Sickle cell crisis ([facility] [address]       IMPRESSION/RECOMMENDATIONS:    Sickle cell anemia:  -The patient is been on Crizanilizumab, But this was felt to be ineffective and discontinued in September 2021  -He is currently on Hydrea 1000 mg daily  -Unfortunately, he has a very low pain tolerance and comes to the emergency room frequently  -We have to fill his pain medications weekly or he is not responsible with them.  -He does not complain of having a crisis this morning, but just jaw pain  -He does not need a transfusion now, but he may in a day or 2      Priapism:  -Earlier this week  -Resolved per the patient    Jaw pain:  -He is currently on clindamycin as prescribed in the emergency department  -Oral surgery consult is ordered  Pain:  -Would continue his oxycodone as needed at this time  -He was given Toradol in the ER, but this could cause bleeding if oral surgery wants to do any procedure and probably would hold off on this          Thank you for asking me to see the patient.        Mar Xiao, MD  Please Contact Through [employer]

## 2022-03-25 NOTE — H&P
Hospital Medicine History & Physical      PCP: Wing Osborn MD    Date of Admission: 3/24/2022    Date of Service: Pt seen/examined on 3/25/2022 and Admitted to Inpatient with expected LOS greater than two midnights due to medical therapy. Chief Complaint:  Facial swelling with fever and chills      History Of Present Illness:    24 y.o. male with a past medical history of sickle cell disease and DVT who presented to [facility] with complaints of facial swelling and fevers. Patient states he noticed the swelling began yesterday and worsened throughout the day. He also endorses left-sided tooth pain. States his pain is a dull ache and is worse with chewing. Rates his pain a 7 out of 10. States Oxycodone provided some relief. Denies radiation of pain. Reports he did take one dose of amoxicillin at home He denies chest pain, shortness of breath, abdominal pain, N/V/D, urinary symptoms, or any focal neurologic deficit. Past Medical History:          Diagnosis Date    Accidental overdose     Asthma     DVT [facility]deep venous thrombosis) ([facility] 10/19/2021    R leg    Enlarged heart     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 ([facility])     Sickle cell anemia ([facility])        Past Surgical History:          Procedure Laterality Date    SPLENECTOMY         Medications Prior to Admission:      Prior to Admission medications    Medication Sig Start Date End Date Taking? Authorizing Provider   apixaban (ELIQUIS) 5 MG TABS tablet Take 1 tablet by mouth 2 times daily 12/31/21   Joanne Overton MD   pantoprazole (PROTONIX) 40 MG tablet Take 1 tablet by mouth every morning (before breakfast) 1/1/22   Joanne Overton MD   folic acid (FOLVITE) 1 MG tablet Take 2 tablets by mouth daily 9/16/21   CARMELITA Fuller - CNP   oxyCODONE HCl (OXY-IR) 10 MG immediate release tablet Take 10 mg by mouth every 4 hours as needed for Pain.     Historical Provider, MD   albuterol sulfate HFA (PROAIR HFA) 108 (90 Base) MCG/ACT inhaler Albuterol HFA Inhaler 90 mcg/actuation  inhaled  2 puffs prn     Active    Historical Provider, MD   levalbuterol (XOPENEX) 0.31 MG/3ML nebulization Levalbuterol Nebulized    2 puffs prn     Active    Historical Provider, MD   docusate (COLACE, DULCOLAX) 100 MG CAPS Take 100 mg by mouth    Historical Provider, MD   ibuprofen (ADVIL;MOTRIN) 800 MG tablet ibuprofen 800 MG Oral Tablet  oral   prn    Active    Historical Provider, MD   hydroxyurea (HYDREA) 500 MG chemo capsule Take 1,000 mg by mouth daily  12/9/20   Chago Orta MD       Allergies:  Morphine    Social History:      The patient currently lives home    TOBACCO:   reports that he has never smoked. He has never used smokeless tobacco.  ETOH:   reports previous alcohol use. E-Cigarettes/Vaping Use     Questions Responses    E-Cigarette/Vaping Use Never User    Start Date     Passive Exposure     Quit Date     Counseling Given     Comments             Family History:      Reviewed in detail and negative for DM, CAD, Cancer, CVA. Positive as follows:        Problem Relation Age of Onset    Other Father         sarcoidosis       REVIEW OF SYSTEMS COMPLETED:   Pertinent positives as noted in the HPI. All other systems reviewed and negative. PHYSICAL EXAM PERFORMED:    /63   Pulse 62   Temp 97.6 °F (36.4 °C) (Oral)   Resp 16   Ht 5' 8\" (1.727 m)   Wt 192 lb 0.3 oz (87.1 kg)   SpO2 91%   BMI 29.20 kg/m²     General appearance:  No apparent distress, appears stated age and cooperative. HEENT: Diffuse left sided facial swelling, upon visualization of mouth multiple dental caries present with diffuse edema of gingiva along left upper quadrant. Normal cephalic, atraumatic without obvious deformity. Pupils equal, round, and reactive to light. Extra ocular muscles intact. Conjunctivae/corneas clear. Neck: Supple, with full range of motion. No jugular venous distention. Trachea midline. Respiratory:  Normal respiratory effort. Clear to auscultation, bilaterally without Rales/Wheezes/Rhonchi. Cardiovascular:  Regular rate and rhythm with normal S1/S2 without murmurs, rubs or gallops. Abdomen: Soft, non-tender, non-distended with normal bowel sounds. Musculoskeletal:  No clubbing, cyanosis or edema bilaterally. Full range of motion without deformity. Skin: Skin color, texture, turgor normal.  No rashes or lesions. Neurologic:  Neurovascularly intact without any focal sensory/motor deficits. Cranial nerves: II-XII intact, grossly non-focal.  Psychiatric:  Alert and oriented, thought content appropriate, normal insight  Capillary Refill: Brisk,3 seconds, normal  Peripheral Pulses: +2 palpable, equal bilaterally       Labs:     Recent Labs     03/25/22  0040   WBC 20.5*   HGB 7.8*   HCT 22.0*   *     Recent Labs     03/25/22 0040      K 4.4      CO2 25   BUN 8   CREATININE 0.8*   CALCIUM 9.5     Recent Labs     03/25/22 0040   AST 38*   ALT 14   BILITOT 6.5*   ALKPHOS 96     No results for input(s): INR in the last 72 hours. No results for input(s): Dilip Snowball in the last 72 hours.     Urinalysis:      Lab Results   Component Value Date    NITRU Negative 03/04/2022    WBCUA 3-5 03/04/2022    BACTERIA 1+ 03/04/2022    RBCUA 0-2 03/04/2022    BLOODU TRACE-INTACT 03/04/2022    SPECGRAV 1.015 03/04/2022    GLUCOSEU Negative 03/04/2022       Radiology:     No orders to display       ASSESSMENT/PLAN:    Active Hospital Problems    Diagnosis Date Noted    Dental infection [K04.7] 03/25/2022    Sickle cell crisis ([facility] [address] [D57.00] 03/25/2022    Sickle cell anemia (HCC) [D57.1] 07/28/2020    Sickle cell pain crisis ([facility] [address] [D57.00] 07/25/2020     Sickle cell anemia  -Heme/onc consulted, appreciate recs, currently on Hydrea 1000 mg daily  -Hgb stable at 7.8, continue to monitor CBC  -Pain regimen with bowel regimen    Facial swelling with jaw and dental infection  -Started on Clindamycin in the ED, continue upon admission  -Oral surgery consulted, appreciate recs   -Currently able to tolerate diet     Leukocytosis, WBC 20.5  -Likely secondary to above  -Trend CBC    Asthma, stable  -Resume home inhalers    History of recent priapism  -Resolved, treated by urology    History of DVT right gastrocnemius  -Continue home Eliquis      DVT Prophylaxis: Eliquis  Diet: ADULT DIET; Regular  Code Status: Full Code    PT/OT Eval Status: Not indicated at this time    Dispo - Pending oral surgery evaluation and ABX therapy, likely 2-4 days        STONEY Tran    Thank you Macrela Elkins MD for the opportunity to be involved in this patient's care. If you have any questions or concerns please feel free to contact me at [phone].

## 2022-03-25 NOTE — ED PROVIDER NOTES
CHIEF COMPLAINT  Back Pain (pt is a sickle cell patient ) and Dental Pain (swelling noted to face)      HISTORY OF PRESENT ILLNESS  Hiwot Hoskins is a 24 y.o. male with a history of sickle cell disease and DVT who presents to the ED complaining of left facial swelling, fever, chills, back pain. Patient states he first noticed swelling of his left cheek earlier today. States his father evaluated the inside of his mouth and advised him he likely had a tooth infection. States he does have some discomfort when chewing. He took a half a tablet of amoxicillin earlier this evening and 1 swelling increased decided to come to the ER. He grams of oxycodone with only minimal pain relief. Denies chest pain, shortness of breath, nausea, vomiting, abdominal pain, urinary symptoms. No other complaints, modifying factors or associated symptoms. I have reviewed the following from the nursing documentation.     Past Medical History:   Diagnosis Date    Accidental overdose     Asthma     D[facility] thrombosis) ([facility] 10/19/2021    R leg    Enlarged heart     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 ([facility])     Sickle cell anemia ([facility])      Past Surgical History:   Procedure Laterality Date    SPLENECTOMY       Family History   Problem Relation Age of Onset    Other Father         sarcoidosis     Social History     Socioeconomic History    Marital status: Single     Spouse name: Not on file    Number of children: 0    Years of education: Not on file    Highest education level: Not on file   Occupational History    Not on file   Tobacco Use    Smoking status: Never Smoker    Smokeless tobacco: Never Used   Vaping Use    Vaping Use: Never used   Substance and Sexual Activity    Alcohol use: Not Currently    Drug use: Never    Sexual activity: Not Currently   Other Topics Concern    Not on file   Social History Narrative    Not on file     Social Determinants of Health     Financial Resource Strain:    [facility] 2 puffs prn     Active      docusate (COLACE, DULCOLAX) 100 MG CAPS Take 100 mg by mouth      ibuprofen (ADVIL;MOTRIN) 800 MG tablet ibuprofen 800 MG Oral Tablet  oral   prn    Active      hydroxyurea (HYDREA) 500 MG chemo capsule Take 1,000 mg by mouth daily        Allergies   Allergen Reactions    Morphine Shortness Of Breath     Other reaction(s): Histamine-like Reaction  Has asthma exacerbation with morphine-histamine type reaction         REVIEW OF SYSTEMS  10 systems reviewed, pertinent positives per HPI otherwise noted to be negative. PHYSICAL EXAM  BP (!) 125/53   Pulse 82   Temp 100.3 °F (37.9 °C) (Oral)   Resp 18   Ht 5' 8\" (1.727 m)   Wt 190 lb (86.2 kg)   SpO2 99%   BMI 28.89 kg/m²   GENERAL APPEARANCE: Awake and alert. Cooperative. Uncomfortable but nontoxic. HEAD: Normocephalic. Atraumatic. EYES: PERRL. EOM's grossly intact. Scleral icterus. ENT: Mucous membranes are moist.  Moderate swelling of the left cheek with tenderness and warmth to palpation but no fluctuance. Multiple dental caries extending down into the dentin. No trismus or stridor  NECK: Supple, trachea midline. HEART: RRR. Normal S1, S2. No murmurs, rubs or gallops. LUNGS: Respirations unlabored. CTAB. Good air exchange. No wheezes, rales, or rhonchi. Speaking comfortably in full sentences. ABDOMEN: Soft. Non-distended. Non-tender. No guarding or rebound. Normal Bowel sounds. EXTREMITIES: No peripheral edema. MAEE. No acute deformities. SKIN: Warm and dry. No acute rashes. NEUROLOGICAL: Alert and oriented X 3. CN II-XII intact. No gross facial drooping. Strength 5/5, sensation intact. No pronator drift. Normal coordination. PSYCHIATRIC: Flat affect. LABS  I have reviewed all labs for this visit.    Results for orders placed or performed during the hospital encounter of 03/24/22   CBC with Auto Differential   Result Value Ref Range    WBC 20.5 (H) 4.0 - 11.0 K/uL    RBC 2.35 (L) 4.20 - 5.90 M/uL Hemoglobin 7.8 (L) 13.5 - 17.5 g/dL    Hematocrit 22.0 (L) 40.5 - 52.5 %    MCV 93.7 80.0 - 100.0 fL    MCH 33.3 26.0 - 34.0 pg    MCHC 35.6 31.0 - 36.0 g/dL    RDW 25.6 (H) 12.4 - 15.4 %    Platelets 801 (H) 289 - 450 K/uL    MPV 8.1 5.0 - 10.5 fL    PLATELET SLIDE REVIEW Increased     Path Consult No     Neutrophils % 74.0 %    Lymphocytes % 17.0 %    Monocytes % 9.0 %    Eosinophils % 0.0 %    Basophils % 0.0 %    Neutrophils Absolute 15.2 (H) 1.7 - 7.7 K/uL    Lymphocytes Absolute 3.5 1.0 - 5.1 K/uL    Monocytes Absolute 1.8 (H) 0.0 - 1.3 K/uL    Eosinophils Absolute 0.0 0.0 - 0.6 K/uL    Basophils Absolute 0.0 0.0 - 0.2 K/uL    nRBC 1 (A) /100 WBC    Anisocytosis 2+ (A)     Macrocytes 1+ (A)     Polychromasia 1+ (A)     Poikilocytes 3+ (A)     Ovalocytes 1+ (A)     Stomatocytes Occasional (A)     Target Cells Occasional (A)     Basophilic Stippling Occasional (A)     Sickle Cells 1+ (A)    Comprehensive Metabolic Panel w/ Reflex to MG   Result Value Ref Range    Sodium 140 136 - 145 mmol/L    Potassium reflex Magnesium 4.4 3.5 - 5.1 mmol/L    Chloride 104 99 - 110 mmol/L    CO2 25 21 - 32 mmol/L    Anion Gap 11 3 - 16    Glucose 93 70 - 99 mg/dL    BUN 8 7 - 20 mg/dL    CREATININE 0.8 (L) 0.9 - 1.3 mg/dL    GFR Non-African American >60 >60    GFR African American >60 >60    Calcium 9.5 8.3 - 10.6 mg/dL    Total Protein 7.5 6.4 - 8.2 g/dL    Albumin 4.9 3.4 - 5.0 g/dL    Albumin/Globulin Ratio 1.9 1.1 - 2.2    Total Bilirubin 6.5 (H) 0.0 - 1.0 mg/dL    Alkaline Phosphatase 96 40 - 129 U/L    ALT 14 10 - 40 U/L    AST 38 (H) 15 - 37 U/L   Lactic Acid   Result Value Ref Range    Lactic Acid 0.8 0.4 - 2.0 mmol/L   Procalcitonin   Result Value Ref Range    Procalcitonin 0.12 0.00 - 0.15 ng/mL   Reticulocytes   Result Value Ref Range    Retic Ct Pct 15.61 (H) 0.50 - 2.18 %    Retic Ct Abs 0.367 M/uL    Immature Retic Fract 0.70 (H) 0.21 - 0.37           RADIOLOGY  X-RAYS:  I have reviewed radiologic plain film image(s). ALL OTHER NON-PLAIN FILM IMAGES SUCH AS CT, ULTRASOUND AND MRI HAVE BEEN READ BY THE RADIOLOGIST. No orders to display              Rechecks: Physical assessment performed. unchanged    ED COURSE/MDM  Patient seen and evaluated. Old records reviewed. Labs and imaging reviewed and results discussed with patient. Patient with left cheek swelling and fever due to odontogenic source. Also with sickle cell pain crisis refractory to multiple doses of Dilaudid and ketorolac. Patient started on IV clindamycin and admitted to the hospitalist service. New Prescriptions    No medications on file       CLINICAL IMPRESSION  1. Sickle cell pain crisis ([facility] [address]    2. Dental infection        Blood pressure (!) 125/53, pulse 82, temperature 100.3 °F (37.9 °C), temperature source Oral, resp. rate 18, height 5' 8\" (1.727 m), weight 190 lb (86.2 kg), SpO2 99 %. 12 [facility] was admitted in stable condition.         Jenean Cowden, MD  03/25/22 8483

## 2022-03-25 NOTE — PROGRESS NOTES
03/25/22 0528   RT Protocol   History Pulmonary Disease 1   Respiratory pattern 0   Breath sounds 0   Cough 0   Indications for Bronchodilator Therapy On home bronchodilators   Bronchodilator Assessment Score 1

## 2022-03-25 NOTE — PLAN OF CARE
Pt alert and oriented x4. Up ad galindo. Adequate urine output. Uncontrolled pain. MD increased PRN pain medications. CT of sinuses done this evening. Tolerating food. Facial swelling continued and multiple decaying teeth noted. MD aware. Oral surgeon consult placed.     Problem: Falls - Risk of:  Goal: Will remain free from falls  Description: Will remain free from falls  Outcome: Ongoing  Goal: Absence of physical injury  Description: Absence of physical injury  Outcome: Ongoing     Problem: Pain:  Goal: Pain level will decrease  Description: Pain level will decrease  Outcome: Ongoing  Goal: Control of acute pain  Description: Control of acute pain  Outcome: Ongoing  Goal: Control of chronic pain  Description: Control of chronic pain  Outcome: Ongoing

## 2022-03-25 NOTE — CARE COORDINATION
CASE MANAGEMENT INITIAL ASSESSMENT      Reviewed chart and completed assessment with patient:bedside  Family present: none  Explained Case Management role/services. Primary contact information:[place]    [facility] Decision Maker :   Primary Decision Maker: Andreia Garnica - Parent - [phone]    Secondary Decision Maker: Gaviota Pritchard - Parent - [phone]          Can this person be reached and be able to respond quickly, such as within a few minutes or hours? Yes    Admit date/status:3/25/22  Diagnosis:sickle cell   Is this a Readmission?:  No      Insurance:[employer]   Precert required for SNF: Yes       3 night stay required: No    Living arrangements, Adls, care needs, prior to admission:lives in home with parents    Durable Medical Equipment at home:  Walker__Cane__RTS__ BSC__Shower Chair__  02__ HHN__ CPAP__  BiPap__  Hospital Bed__ W/C___ Other_____    Services in the home and/or outpatient, prior to admission:none    Current PCP:[facility]    Transportation needs: n     PT/OT recs:none    Celine Steele 46 Notification (HEN):needed for SNf    Barriers to discharge:none    Plan/comments:spoke with patient. Plans on returning home at discharge. D/c pending pain control no DCP needs identified.  Jaylene Lawson RN       ECOC on chart for MD signature

## 2022-03-25 NOTE — ACP (ADVANCE CARE PLANNING)
Advance Care Planning     General Advance Care Planning (ACP) Conversation    Date of Conversation: 3/24/2022  Conducted with: Patient with Decision Making Capacity    Healthcare Decision Maker:    Primary Decision Maker: Jeremy Cindy - Parent - [phone]    Secondary Decision Maker: Leo Vail - Parent - [phone]  Click here to complete Healthcare Decision Makers including selection of the Healthcare Decision Maker Relationship (ie \"Primary\"). Today we documented Decision Maker(s) consistent with Legal Next of Kin hierarchy.     Content/Action Overview:  Has NO ACP documents/care preferences - information provided, considering goals and options  Reviewed DNR/DNI and patient elects Full Code (Attempt Resuscitation)      Length of Voluntary ACP Conversation in minutes:  <16 minutes (Non-Billable)    Niko Rosario RN

## 2022-03-25 NOTE — CONSULTS
Consult call back    Who:Dr. Blessing Johnson AM        Electronically signed by Tra Baez on 3/25/2022 at 8:52 AM

## 2022-03-25 NOTE — PROGRESS NOTES
Patient transferred via wheelchair from ED to C331. Transferred self to bed, oriented to call light and room.

## 2022-03-26 LAB
ABO/RH: NORMAL
ANTIBODY SCREEN: NORMAL
BASOPHILS ABSOLUTE: 0.1 K/UL (ref 0–0.2)
BASOPHILS RELATIVE PERCENT: 1 %
EOSINOPHILS ABSOLUTE: 0.3 K/UL (ref 0–0.6)
EOSINOPHILS RELATIVE PERCENT: 2 %
HCT VFR BLD CALC: 18.7 % (ref 40.5–52.5)
HCT VFR BLD CALC: 24.7 % (ref 40.5–52.5)
HEMOGLOBIN: 6.6 G/DL (ref 13.5–17.5)
HEMOGLOBIN: 8.7 G/DL (ref 13.5–17.5)
LYMPHOCYTES ABSOLUTE: 3.2 K/UL (ref 1–5.1)
LYMPHOCYTES RELATIVE PERCENT: 22.4 %
MCH RBC QN AUTO: 33.7 PG (ref 26–34)
MCHC RBC AUTO-ENTMCNC: 35.1 G/DL (ref 31–36)
MCV RBC AUTO: 96 FL (ref 80–100)
MONOCYTES ABSOLUTE: 1.2 K/UL (ref 0–1.3)
MONOCYTES RELATIVE PERCENT: 8.5 %
NEUTROPHILS ABSOLUTE: 9.4 K/UL (ref 1.7–7.7)
NEUTROPHILS RELATIVE PERCENT: 66.1 %
PDW BLD-RTO: 23 % (ref 12.4–15.4)
PLATELET # BLD: 381 K/UL (ref 135–450)
PMV BLD AUTO: 8.3 FL (ref 5–10.5)
RBC # BLD: 1.95 M/UL (ref 4.2–5.9)
WBC # BLD: 14.2 K/UL (ref 4–11)

## 2022-03-26 PROCEDURE — 85014 HEMATOCRIT: CPT

## 2022-03-26 PROCEDURE — 36430 TRANSFUSION BLD/BLD COMPNT: CPT

## 2022-03-26 PROCEDURE — 6370000000 HC RX 637 (ALT 250 FOR IP): Performed by: NURSE PRACTITIONER

## 2022-03-26 PROCEDURE — 87040 BLOOD CULTURE FOR BACTERIA: CPT

## 2022-03-26 PROCEDURE — 85025 COMPLETE CBC W/AUTO DIFF WBC: CPT

## 2022-03-26 PROCEDURE — 36415 COLL VENOUS BLD VENIPUNCTURE: CPT

## 2022-03-26 PROCEDURE — 86923 COMPATIBILITY TEST ELECTRIC: CPT

## 2022-03-26 PROCEDURE — 6370000000 HC RX 637 (ALT 250 FOR IP): Performed by: INTERNAL MEDICINE

## 2022-03-26 PROCEDURE — 86900 BLOOD TYPING SEROLOGIC ABO: CPT

## 2022-03-26 PROCEDURE — 86901 BLOOD TYPING SEROLOGIC RH(D): CPT

## 2022-03-26 PROCEDURE — 2580000003 HC RX 258: Performed by: NURSE PRACTITIONER

## 2022-03-26 PROCEDURE — P9016 RBC LEUKOCYTES REDUCED: HCPCS

## 2022-03-26 PROCEDURE — 6370000000 HC RX 637 (ALT 250 FOR IP): Performed by: PHYSICIAN ASSISTANT

## 2022-03-26 PROCEDURE — 86902 BLOOD TYPE ANTIGEN DONOR EA: CPT

## 2022-03-26 PROCEDURE — 1200000000 HC SEMI PRIVATE

## 2022-03-26 PROCEDURE — 85018 HEMOGLOBIN: CPT

## 2022-03-26 PROCEDURE — 86850 RBC ANTIBODY SCREEN: CPT

## 2022-03-26 PROCEDURE — 85660 RBC SICKLE CELL TEST: CPT

## 2022-03-26 PROCEDURE — 6360000002 HC RX W HCPCS: Performed by: PHYSICIAN ASSISTANT

## 2022-03-26 PROCEDURE — 2500000003 HC RX 250 WO HCPCS: Performed by: NURSE PRACTITIONER

## 2022-03-26 RX ORDER — SODIUM CHLORIDE 9 MG/ML
INJECTION, SOLUTION INTRAVENOUS PRN
Status: DISCONTINUED | OUTPATIENT
Start: 2022-03-26 | End: 2022-03-28 | Stop reason: HOSPADM

## 2022-03-26 RX ADMIN — PENICILLIN V POTASSIUM 500 MG: 250 POWDER, FOR SOLUTION ORAL at 20:44

## 2022-03-26 RX ADMIN — HYDROMORPHONE HYDROCHLORIDE 2 MG: 2 INJECTION, SOLUTION INTRAMUSCULAR; INTRAVENOUS; SUBCUTANEOUS at 03:34

## 2022-03-26 RX ADMIN — PENICILLIN V POTASSIUM 500 MG: 250 POWDER, FOR SOLUTION ORAL at 14:12

## 2022-03-26 RX ADMIN — HYDROMORPHONE HYDROCHLORIDE 2 MG: 2 INJECTION, SOLUTION INTRAMUSCULAR; INTRAVENOUS; SUBCUTANEOUS at 12:49

## 2022-03-26 RX ADMIN — HYDROMORPHONE HYDROCHLORIDE 2 MG: 2 INJECTION, SOLUTION INTRAMUSCULAR; INTRAVENOUS; SUBCUTANEOUS at 00:33

## 2022-03-26 RX ADMIN — OXYCODONE HYDROCHLORIDE 20 MG: 15 TABLET ORAL at 20:44

## 2022-03-26 RX ADMIN — HYDROMORPHONE HYDROCHLORIDE 2 MG: 2 INJECTION, SOLUTION INTRAMUSCULAR; INTRAVENOUS; SUBCUTANEOUS at 19:28

## 2022-03-26 RX ADMIN — SODIUM CHLORIDE: 9 INJECTION, SOLUTION INTRAVENOUS at 09:17

## 2022-03-26 RX ADMIN — CLINDAMYCIN PHOSPHATE 600 MG: 600 INJECTION, SOLUTION INTRAVENOUS at 01:45

## 2022-03-26 RX ADMIN — APIXABAN 5 MG: 5 TABLET, FILM COATED ORAL at 20:25

## 2022-03-26 RX ADMIN — OXYCODONE HYDROCHLORIDE 20 MG: 15 TABLET ORAL at 04:39

## 2022-03-26 RX ADMIN — HYDROMORPHONE HYDROCHLORIDE 2 MG: 2 INJECTION, SOLUTION INTRAMUSCULAR; INTRAVENOUS; SUBCUTANEOUS at 06:41

## 2022-03-26 RX ADMIN — HYDROMORPHONE HYDROCHLORIDE 2 MG: 2 INJECTION, SOLUTION INTRAMUSCULAR; INTRAVENOUS; SUBCUTANEOUS at 16:14

## 2022-03-26 RX ADMIN — CLINDAMYCIN PHOSPHATE 600 MG: 600 INJECTION, SOLUTION INTRAVENOUS at 20:22

## 2022-03-26 RX ADMIN — APIXABAN 5 MG: 5 TABLET, FILM COATED ORAL at 09:21

## 2022-03-26 RX ADMIN — PENICILLIN V POTASSIUM 500 MG: 250 POWDER, FOR SOLUTION ORAL at 06:44

## 2022-03-26 RX ADMIN — HYDROXYUREA 1000 MG: 500 CAPSULE ORAL at 09:21

## 2022-03-26 RX ADMIN — FOLIC ACID 2 MG: 1 TABLET ORAL at 09:21

## 2022-03-26 RX ADMIN — CLINDAMYCIN PHOSPHATE 600 MG: 600 INJECTION, SOLUTION INTRAVENOUS at 09:18

## 2022-03-26 RX ADMIN — OXYCODONE HYDROCHLORIDE 20 MG: 15 TABLET ORAL at 14:50

## 2022-03-26 RX ADMIN — HYDROMORPHONE HYDROCHLORIDE 2 MG: 2 INJECTION, SOLUTION INTRAMUSCULAR; INTRAVENOUS; SUBCUTANEOUS at 22:31

## 2022-03-26 RX ADMIN — HYDROMORPHONE HYDROCHLORIDE 2 MG: 2 INJECTION, SOLUTION INTRAMUSCULAR; INTRAVENOUS; SUBCUTANEOUS at 09:42

## 2022-03-26 RX ADMIN — OXYCODONE HYDROCHLORIDE 20 MG: 15 TABLET ORAL at 10:48

## 2022-03-26 RX ADMIN — PANTOPRAZOLE SODIUM 40 MG: 40 TABLET, DELAYED RELEASE ORAL at 06:44

## 2022-03-26 ASSESSMENT — PAIN SCALES - GENERAL
PAINLEVEL_OUTOF10: 9
PAINLEVEL_OUTOF10: 8
PAINLEVEL_OUTOF10: 4
PAINLEVEL_OUTOF10: 8
PAINLEVEL_OUTOF10: 9
PAINLEVEL_OUTOF10: 8

## 2022-03-26 ASSESSMENT — PAIN DESCRIPTION - FREQUENCY: FREQUENCY: CONTINUOUS

## 2022-03-26 ASSESSMENT — PAIN DESCRIPTION - PAIN TYPE
TYPE: CHRONIC PAIN;ACUTE PAIN
TYPE: CHRONIC PAIN
TYPE: CHRONIC PAIN;ACUTE PAIN
TYPE: CHRONIC PAIN;ACUTE PAIN

## 2022-03-26 ASSESSMENT — PAIN DESCRIPTION - LOCATION
LOCATION: FACE
LOCATION: BACK;FACE

## 2022-03-26 ASSESSMENT — PAIN DESCRIPTION - ORIENTATION: ORIENTATION: LEFT;LOWER

## 2022-03-26 ASSESSMENT — PAIN DESCRIPTION - DESCRIPTORS: DESCRIPTORS: ACHING

## 2022-03-26 NOTE — PROGRESS NOTES
Notified MD of critical hgb. Order placed to transfuse 1 unit. [address] [facility],   [address] or Facility: [facility] From: Yessi Howard RE: [facility] RM: 331 Hgb 6.6 Hct 18.7 Need Callback: NO CALLBACK REQ C3 ONCOLOGY ROUTINE  Read 7:35 AM     3/26/22 7:35 AM   Please transfuse 1 unit     3/26/22 7:39 AM   Please place order for dayshift RN. Thank you.   Read 7:39 AM     3/26/22 7:39 AM   Please have them enter order Im not in the hospital now

## 2022-03-26 NOTE — PROGRESS NOTES
ONCOLOGY HEMATOLOGY CARE PROGRESS NOTE      SUBJECTIVE:     Patient feels a little better this morning. ROS:     Constitutional:  No weight loss, No fever, No chills, No night sweats. Energy level good. Eyes:  No impairment or change in vision  ENT / Mouth:  No pain, abnormal ulceration, bleeding, nasal drip or change in voice or hearing  Cardiovascular:  No chest pain, palpitations, new edema, or calf discomfort  Respiratory:  No pain, hemoptysis, change to breathing  Breast:  No pain, discharge, change in appearance or texture  Gastrointestinal:  No pain, cramping, jaundice, change to eating and bowel habits  Urinary:  No pain, bleeding or change in continence  Genitalia: No pain, bleeding or discharge  Musculoskeletal:  No redness, pain, edema or weakness  Skin:  No pruritus, rash, change to nodules or lesions  Neurologic:  No discomfort, change in mental status, speech, sensory or motor activity  Psychiatric:  No change in concentration or change to affect or mood  Endocrine:  No hot flashes, increased thirst, or change to urine production  Hematologic: No petechiae, ecchymosis or bleeding  Lymphatic:  No lymphadenopathy or lymphedema  Allergy / Immunologic:  No eczema, hives, frequent or recurrent infections    OBJECTIVE        Physical    VITALS:  BP (!) 95/52   Pulse 79   Temp 98.1 °F (36.7 °C) (Oral)   Resp 16   Ht 5' 8\" (1.727 m)   Wt 192 lb 0.3 oz (87.1 kg)   SpO2 90%   BMI 29.20 kg/m²   TEMPERATURE:  Current - Temp: 98.1 °F (36.7 °C);  Max - Temp  Av.4 °F (36.9 °C)  Min: 98.1 °F (36.7 °C)  Max: 98.8 °F (37.1 °C)  PULSE OXIMETRY RANGE: SpO2  Av.5 %  Min: 89 %  Max: 90 %  24HR INTAKE/OUTPUT:    Intake/Output Summary (Last 24 hours) at 3/26/2022 0910  Last data filed at 3/25/2022 2143  Gross per 24 hour   Intake --   Output 1200 ml   Net -1200 ml       CONSTITUTIONAL: awake, alert, cooperative, no apparent distress   EYES: pupils equal, round and reactive to light, sclera clear and conjunctiva normal  ENT: Normocephalic, without obvious abnormality, atraumatic  NECK: supple, symmetrical, no jugular venous distension and no carotid bruits   HEMATOLOGIC/LYMPHATIC: no cervical, supraclavicular or axillary lymphadenopathy   LUNGS: no increased work of breathing and clear to auscultation   CARDIOVASCULAR: regular rate and rhythm, normal S1 and S2, no murmur noted  ABDOMEN: normal bowel sounds x 4, soft, non-distended, non-tender, no masses palpated, no hepatosplenomgaly   MUSCULOSKELETAL: full range of motion noted, tone is normal  NEUROLOGIC: awake, alert, oriented to name, place and time. Motor skills grossly intact. SKIN: Normal skin color, texture, turgor and no jaundice.  appears intact   EXTREMITIES: no LE edema       Data      Recent Labs     03/25/22  0040 03/26/22  0523   WBC 20.5* 14.2*   HGB 7.8* 6.6*   HCT 22.0* 18.7*   * 381   MCV 93.7 96.0        Recent Labs     03/25/22  0040      K 4.4      CO2 25   BUN 8   CREATININE 0.8*     Recent Labs     03/25/22  0040   AST 38*   ALT 14   BILITOT 6.5*   ALKPHOS 96       Magnesium:    Lab Results   Component Value Date    MG 2.10 01/16/2022    MG 2.00 09/13/2021    MG 1.80 05/05/2021         Problem List  Patient Active Problem List   Diagnosis    [facility] [address]    Asthma    Sickle cell anemia (HCC)    Sepsis [facility] [address]    Opiate overdose [facility] [address]    Opiate antagonist poisoning, accidental or unintentional, initial encounter    Leukocytosis    Hypoxia    Anemia    Other chest pain    Pneumonia due to infectious organism    Fever    Chronic prescription opiate use    Right leg pain    Dental infection    Sickle cell crisis ([facility] [address]       ASSESSMENT AND PLAN:    Sickle cell anemia:  -The patient is been on Crizanilizumab, But this was felt to be ineffective and discontinued in September 2021  -He is currently on Hydrea 1000 mg daily  -Unfortunately, he has a very low pain tolerance and comes to the emergency room frequently  -We have to fill his pain medications weekly or he is not responsible with them.  -He does not complain of having a crisis this morning, but just jaw pain  -He does not need a transfusion now, but he may in a day or 2        Priapism:  -Earlier this week  -Resolved per the patient     Jaw pain:  -He is currently on clindamycin as prescribed in the emergency department  -Oral surgery consult is ordered and pending  -His jaw looks a little better this morning.     Pain:  -Would continue his oxycodone as needed at this time  -He was given Toradol in the ER, but this could cause bleeding if oral surgery wants to do any procedure and probably would hold off on this        -He does not appear to need any additional pain medication  -If his pain needs accelerate, I would add 15 mg of MS Contin p.o. every 12    ONCOLOGIC DISPOSITION:      Janene Green MD  Please contact through [address]

## 2022-03-26 NOTE — PROGRESS NOTES
Call made to oral surgery about consult. Consulted yesterday, but no one has came to see pt yet. No call back.

## 2022-03-26 NOTE — PROGRESS NOTES
Pt received IV Dilaudid at 0330 and PO Oxycodone at 0430. Pt drifting to sleep while this RN bedside charting and when wakes ask when he can have his next pain medication. Pt called out at 0530 asking for Oxycodone forgetting he received it at 0430. Pt informed his next PRN pain medication will be available at 0630. Pt fell back asleep breathing unlabored.

## 2022-03-26 NOTE — PROGRESS NOTES
Hospitalist Progress Note      PCP: Rex Deleon MD    Date of Admission: 3/24/2022    Chief Complaint: Facial swelling with fevers and chills    Hospital Course: 24 y.o. male with a past medical history of sickle cell disease and DVT who presented to [facility] with complaints of facial swelling and fevers. Patient states he noticed the swelling began yesterday and worsened throughout the day. He also endorses left-sided tooth pain. States his pain is a dull ache and is worse with chewing. Rates his pain a 7 out of 10. States Oxycodone provided some relief. Denies radiation of pain. Reports he did take one dose of amoxicillin at home He denies chest pain, shortness of breath, abdominal pain, N/V/D, urinary symptoms, or any focal neurologic deficit. Subjective:   Seen sitting up in bed this morning resting comfortably. Reports pain controlled with current pain regimen. No other complaints at this time denies shortness of breath, chest pain, fevers or chills, lightheaded or dizziness, vision changes, trouble swallowing, or focal neurologic. Vital signs stable.   Afebrile      Medications:  Reviewed    Infusion Medications    sodium chloride      sodium chloride      sodium chloride 75 mL/hr at 03/26/22 0917     Scheduled Medications    clindamycin (CLEOCIN) IV  600 mg IntraVENous Q8H    apixaban  5 mg Oral BID    folic acid  2 mg Oral Daily    hydroxyurea  1,000 mg Oral Daily    pantoprazole  40 mg Oral QAM AC    sodium chloride flush  5-40 mL IntraVENous 2 times per day    penicillin v potassium  500 mg Oral 4 times per day     PRN Meds: sodium chloride, levalbuterol, albuterol sulfate HFA, sodium chloride flush, sodium chloride, ondansetron **OR** ondansetron, polyethylene glycol, acetaminophen **OR** acetaminophen, Lip Balm, HYDROmorphone **OR** HYDROmorphone, oxyCODONE HCl      Intake/Output Summary (Last 24 hours) at 3/26/2022 1620  Last data filed at 3/26/2022 1434  Gross per 24 hour   Intake --   Output 900 ml   Net -900 ml       Physical Exam Performed:    /62   Pulse 89   Temp 98.8 °F (37.1 °C) (Oral)   Resp 16   Ht 5' 8\" (1.727 m)   Wt 192 lb 0.3 oz (87.1 kg)   SpO2 91%   BMI 29.20 kg/m²     General appearance: No apparent distress, appears stated age and cooperative. HEENT: Left-sided facial swelling extending the periorbital space, slightly better today pupils equal, round, and reactive to light. Conjunctivae/corneas clear. Neck: Supple, with full range of motion. No jugular venous distention. Trachea midline. Respiratory:  Normal respiratory effort. Clear to auscultation, bilaterally without Rales/Wheezes/Rhonchi. Cardiovascular: Regular rate and rhythm with normal S1/S2 without murmurs, rubs or gallops. Abdomen: Soft, non-tender, non-distended with normal bowel sounds. Musculoskeletal: No clubbing, cyanosis or edema bilaterally. Full range of motion without deformity. Skin: Skin color, texture, turgor normal.  No rashes or lesions. Neurologic:  Neurovascularly intact without any focal sensory/motor deficits. Cranial nerves: II-XII intact, grossly non-focal.  Psychiatric: Alert and oriented, thought content appropriate, normal insight  Capillary Refill: Brisk,3 seconds, normal   Peripheral Pulses: +2 palpable, equal bilaterally       Labs:   Recent Labs     03/25/22  0040 03/26/22  0523   WBC 20.5* 14.2*   HGB 7.8* 6.6*   HCT 22.0* 18.7*   * 381     Recent Labs     03/25/22  0040      K 4.4      CO2 25   BUN 8   CREATININE 0.8*   CALCIUM 9.5     Recent Labs     03/25/22  0040   AST 38*   ALT 14   BILITOT 6.5*   ALKPHOS 96     No results for input(s): INR in the last 72 hours. No results for input(s): Sky Karlo in the last 72 hours.     Urinalysis:      Lab Results   Component Value Date    NITRU Negative 03/04/2022    WBCUA 3-5 03/04/2022    BACTERIA 1+ 03/04/2022    RBCUA 0-2 03/04/2022    BLOODU TRACE-INTACT 03/04/2022 SPECGRAV 1.015 03/04/2022    GLUCOSEU Negative 03/04/2022       Radiology:  CT FACIAL BONES WO CONTRAST   Final Result   1. Dental caries and periapical lucencies about the left 3rd mandibular molar   and left maxillary premolar with adjacent left pre maxillary facial swelling. No organized fluid collection seen. 2. Mildly enlarged bilateral cervical lymph nodes, probably reactive. Assessment/Plan:    Active Hospital Problems    Diagnosis     Dental infection [K04.7]     Sickle cell crisis ([facility] [address] [D57.00]     Sickle cell anemia (HCC) [D57.1]     Sickle cell pain crisis ([facility] [address] [D57.00]      Sickle cell anemia  -Heme/onc consulted, appreciate recs, currently on Hydrea 1000 mg daily  -Continue to monitor CBC, hemoglobin 6.6 today, receiving 1 unit PRBCs  -Pain regimen with bowel regimen    Facial swelling with jaw and dental infection  -Started on Clindamycin in the ED, continue upon admission  -Oral surgery consulted, appreciate recs   -Currently able to tolerate diet   -ID consulted appreciate recs    Leukocytosis   -Likely secondary to above  -Continue to trend CBC, WBC trending down    Asthma, stable  -Resume home inhalers    History of recent priapism  -Resolved, treated by urology    History of DVT right gastrocnemius  -Continue home Eliquis    DVT Prophylaxis: Eliquis  Diet: ADULT DIET;  Regular  Code Status: Full Code    PT/OT Eval Status: Ordered    Dispo -likely 1-2 more days    STONEY Lomax

## 2022-03-27 LAB
ANION GAP SERPL CALCULATED.3IONS-SCNC: 10 MMOL/L (ref 3–16)
BASOPHILS ABSOLUTE: 0.2 K/UL (ref 0–0.2)
BASOPHILS RELATIVE PERCENT: 1.8 %
BUN BLDV-MCNC: 7 MG/DL (ref 7–20)
CALCIUM SERPL-MCNC: 9.4 MG/DL (ref 8.3–10.6)
CHLORIDE BLD-SCNC: 101 MMOL/L (ref 99–110)
CO2: 27 MMOL/L (ref 21–32)
CREAT SERPL-MCNC: 0.7 MG/DL (ref 0.9–1.3)
EOSINOPHILS ABSOLUTE: 0.4 K/UL (ref 0–0.6)
EOSINOPHILS RELATIVE PERCENT: 3.1 %
GFR AFRICAN AMERICAN: >60
GFR NON-AFRICAN AMERICAN: >60
GLUCOSE BLD-MCNC: 101 MG/DL (ref 70–99)
HCT VFR BLD CALC: 22.2 % (ref 40.5–52.5)
HCT VFR BLD CALC: 23.2 % (ref 40.5–52.5)
HEMOGLOBIN: 7.8 G/DL (ref 13.5–17.5)
HEMOGLOBIN: 7.9 G/DL (ref 13.5–17.5)
LYMPHOCYTES ABSOLUTE: 2.5 K/UL (ref 1–5.1)
LYMPHOCYTES RELATIVE PERCENT: 19.1 %
MCH RBC QN AUTO: 32.7 PG (ref 26–34)
MCHC RBC AUTO-ENTMCNC: 34 G/DL (ref 31–36)
MCV RBC AUTO: 96.2 FL (ref 80–100)
MONOCYTES ABSOLUTE: 1.5 K/UL (ref 0–1.3)
MONOCYTES RELATIVE PERCENT: 11.4 %
NEUTROPHILS ABSOLUTE: 8.4 K/UL (ref 1.7–7.7)
NEUTROPHILS RELATIVE PERCENT: 64.6 %
PDW BLD-RTO: 23.3 % (ref 12.4–15.4)
PLATELET # BLD: 415 K/UL (ref 135–450)
PMV BLD AUTO: 8.2 FL (ref 5–10.5)
POTASSIUM REFLEX MAGNESIUM: 4.3 MMOL/L (ref 3.5–5.1)
RBC # BLD: 2.41 M/UL (ref 4.2–5.9)
SODIUM BLD-SCNC: 138 MMOL/L (ref 136–145)
WBC # BLD: 13 K/UL (ref 4–11)

## 2022-03-27 PROCEDURE — 6370000000 HC RX 637 (ALT 250 FOR IP): Performed by: NURSE PRACTITIONER

## 2022-03-27 PROCEDURE — 6370000000 HC RX 637 (ALT 250 FOR IP): Performed by: PHYSICIAN ASSISTANT

## 2022-03-27 PROCEDURE — 1200000000 HC SEMI PRIVATE

## 2022-03-27 PROCEDURE — 85025 COMPLETE CBC W/AUTO DIFF WBC: CPT

## 2022-03-27 PROCEDURE — 6370000000 HC RX 637 (ALT 250 FOR IP): Performed by: INTERNAL MEDICINE

## 2022-03-27 PROCEDURE — 2500000003 HC RX 250 WO HCPCS: Performed by: NURSE PRACTITIONER

## 2022-03-27 PROCEDURE — 6360000002 HC RX W HCPCS: Performed by: PHYSICIAN ASSISTANT

## 2022-03-27 PROCEDURE — 6360000002 HC RX W HCPCS: Performed by: INTERNAL MEDICINE

## 2022-03-27 PROCEDURE — 85018 HEMOGLOBIN: CPT

## 2022-03-27 PROCEDURE — 2580000003 HC RX 258: Performed by: NURSE PRACTITIONER

## 2022-03-27 PROCEDURE — 2580000003 HC RX 258: Performed by: INTERNAL MEDICINE

## 2022-03-27 PROCEDURE — 85014 HEMATOCRIT: CPT

## 2022-03-27 PROCEDURE — 36415 COLL VENOUS BLD VENIPUNCTURE: CPT

## 2022-03-27 PROCEDURE — 99223 1ST HOSP IP/OBS HIGH 75: CPT | Performed by: INTERNAL MEDICINE

## 2022-03-27 PROCEDURE — 80048 BASIC METABOLIC PNL TOTAL CA: CPT

## 2022-03-27 RX ORDER — LACTOBACILLUS RHAMNOSUS GG 10B CELL
1 CAPSULE ORAL 2 TIMES DAILY WITH MEALS
Status: DISCONTINUED | OUTPATIENT
Start: 2022-03-27 | End: 2022-03-28 | Stop reason: HOSPADM

## 2022-03-27 RX ADMIN — CLINDAMYCIN PHOSPHATE 600 MG: 600 INJECTION, SOLUTION INTRAVENOUS at 02:39

## 2022-03-27 RX ADMIN — HYDROMORPHONE HYDROCHLORIDE 2 MG: 2 INJECTION, SOLUTION INTRAMUSCULAR; INTRAVENOUS; SUBCUTANEOUS at 01:38

## 2022-03-27 RX ADMIN — OXYCODONE HYDROCHLORIDE 20 MG: 15 TABLET ORAL at 02:38

## 2022-03-27 RX ADMIN — HYDROMORPHONE HYDROCHLORIDE 2 MG: 2 INJECTION, SOLUTION INTRAMUSCULAR; INTRAVENOUS; SUBCUTANEOUS at 14:45

## 2022-03-27 RX ADMIN — HYDROMORPHONE HYDROCHLORIDE 2 MG: 2 INJECTION, SOLUTION INTRAMUSCULAR; INTRAVENOUS; SUBCUTANEOUS at 11:44

## 2022-03-27 RX ADMIN — HYDROMORPHONE HYDROCHLORIDE 2 MG: 2 INJECTION, SOLUTION INTRAMUSCULAR; INTRAVENOUS; SUBCUTANEOUS at 21:02

## 2022-03-27 RX ADMIN — PENICILLIN V POTASSIUM 500 MG: 250 POWDER, FOR SOLUTION ORAL at 06:50

## 2022-03-27 RX ADMIN — HYDROXYUREA 1000 MG: 500 CAPSULE ORAL at 08:02

## 2022-03-27 RX ADMIN — OXYCODONE HYDROCHLORIDE 20 MG: 15 TABLET ORAL at 22:04

## 2022-03-27 RX ADMIN — OXYCODONE HYDROCHLORIDE 20 MG: 15 TABLET ORAL at 10:28

## 2022-03-27 RX ADMIN — HYDROMORPHONE HYDROCHLORIDE 2 MG: 2 INJECTION, SOLUTION INTRAMUSCULAR; INTRAVENOUS; SUBCUTANEOUS at 08:02

## 2022-03-27 RX ADMIN — APIXABAN 5 MG: 5 TABLET, FILM COATED ORAL at 20:59

## 2022-03-27 RX ADMIN — FOLIC ACID 2 MG: 1 TABLET ORAL at 08:02

## 2022-03-27 RX ADMIN — PANTOPRAZOLE SODIUM 40 MG: 40 TABLET, DELAYED RELEASE ORAL at 06:50

## 2022-03-27 RX ADMIN — SODIUM CHLORIDE: 9 INJECTION, SOLUTION INTRAVENOUS at 03:49

## 2022-03-27 RX ADMIN — AMPICILLIN SODIUM AND SULBACTAM SODIUM 3000 MG: 2; 1 INJECTION, POWDER, FOR SOLUTION INTRAMUSCULAR; INTRAVENOUS at 17:46

## 2022-03-27 RX ADMIN — Medication 1 CAPSULE: at 16:43

## 2022-03-27 RX ADMIN — HYDROMORPHONE HYDROCHLORIDE 2 MG: 2 INJECTION, SOLUTION INTRAMUSCULAR; INTRAVENOUS; SUBCUTANEOUS at 04:40

## 2022-03-27 RX ADMIN — ACETAMINOPHEN 650 MG: 325 TABLET ORAL at 13:09

## 2022-03-27 RX ADMIN — PENICILLIN V POTASSIUM 500 MG: 250 POWDER, FOR SOLUTION ORAL at 01:03

## 2022-03-27 RX ADMIN — CLINDAMYCIN PHOSPHATE 600 MG: 600 INJECTION, SOLUTION INTRAVENOUS at 08:06

## 2022-03-27 RX ADMIN — HYDROMORPHONE HYDROCHLORIDE 2 MG: 2 INJECTION, SOLUTION INTRAMUSCULAR; INTRAVENOUS; SUBCUTANEOUS at 17:44

## 2022-03-27 RX ADMIN — APIXABAN 5 MG: 5 TABLET, FILM COATED ORAL at 08:02

## 2022-03-27 RX ADMIN — OXYCODONE HYDROCHLORIDE 20 MG: 15 TABLET ORAL at 16:43

## 2022-03-27 RX ADMIN — SODIUM CHLORIDE 1000 ML: 9 INJECTION, SOLUTION INTRAVENOUS at 22:45

## 2022-03-27 RX ADMIN — PENICILLIN V POTASSIUM 500 MG: 250 POWDER, FOR SOLUTION ORAL at 11:44

## 2022-03-27 RX ADMIN — SODIUM CHLORIDE, PRESERVATIVE FREE 10 ML: 5 INJECTION INTRAVENOUS at 08:03

## 2022-03-27 RX ADMIN — AMPICILLIN SODIUM AND SULBACTAM SODIUM 3000 MG: 2; 1 INJECTION, POWDER, FOR SOLUTION INTRAMUSCULAR; INTRAVENOUS at 13:10

## 2022-03-27 ASSESSMENT — PAIN SCALES - GENERAL
PAINLEVEL_OUTOF10: 8
PAINLEVEL_OUTOF10: 9
PAINLEVEL_OUTOF10: 8
PAINLEVEL_OUTOF10: 8
PAINLEVEL_OUTOF10: 9
PAINLEVEL_OUTOF10: 8
PAINLEVEL_OUTOF10: 7
PAINLEVEL_OUTOF10: 8
PAINLEVEL_OUTOF10: 8
PAINLEVEL_OUTOF10: 4
PAINLEVEL_OUTOF10: 8
PAINLEVEL_OUTOF10: 6
PAINLEVEL_OUTOF10: 7
PAINLEVEL_OUTOF10: 8
PAINLEVEL_OUTOF10: 9
PAINLEVEL_OUTOF10: 8

## 2022-03-27 ASSESSMENT — PAIN DESCRIPTION - LOCATION
LOCATION: FACE
LOCATION: FACE;BACK
LOCATION: FACE
LOCATION: FACE

## 2022-03-27 ASSESSMENT — PAIN DESCRIPTION - DESCRIPTORS
DESCRIPTORS: ACHING;CRUSHING;CONSTANT
DESCRIPTORS: ACHING;CONSTANT
DESCRIPTORS: ACHING;CRUSHING

## 2022-03-27 ASSESSMENT — PAIN DESCRIPTION - PROGRESSION
CLINICAL_PROGRESSION: GRADUALLY WORSENING

## 2022-03-27 ASSESSMENT — PAIN DESCRIPTION - PAIN TYPE
TYPE: ACUTE PAIN
TYPE: ACUTE PAIN
TYPE: CHRONIC PAIN

## 2022-03-27 ASSESSMENT — PAIN DESCRIPTION - FREQUENCY
FREQUENCY: CONTINUOUS
FREQUENCY: CONTINUOUS

## 2022-03-27 ASSESSMENT — PAIN DESCRIPTION - ORIENTATION
ORIENTATION: LEFT;LOWER
ORIENTATION: LEFT
ORIENTATION: LEFT

## 2022-03-27 NOTE — PROGRESS NOTES
ONCOLOGY HEMATOLOGY CARE PROGRESS NOTE      SUBJECTIVE:     The patient states continues to improve. ROS:     Constitutional:  No weight loss, No fever, No chills, No night sweats. Energy level good. Eyes:  No impairment or change in vision  ENT / Mouth: He states his pain is much better in the left facial and jaw area. Cardiovascular:  No chest pain, palpitations, new edema, or calf discomfort  Respiratory:  No pain, hemoptysis, change to breathing  Breast:  No pain, discharge, change in appearance or texture  Gastrointestinal:  No pain, cramping, jaundice, change to eating and bowel habits  Urinary:  No pain, bleeding or change in continence  Genitalia: No pain, bleeding or discharge  Musculoskeletal:  No redness, pain, edema or weakness  Skin:  No pruritus, rash, change to nodules or lesions  Neurologic:  No discomfort, change in mental status, speech, sensory or motor activity  Psychiatric:  No change in concentration or change to affect or mood  Endocrine:  No hot flashes, increased thirst, or change to urine production  Hematologic: No petechiae, ecchymosis or bleeding  Lymphatic:  No lymphadenopathy or lymphedema  Allergy / Immunologic:  No eczema, hives, frequent or recurrent infections    OBJECTIVE        Physical    VITALS:  /66   Pulse 67   Temp 98 °F (36.7 °C) (Oral)   Resp 16   Ht 5' 8\" (1.727 m)   Wt 192 lb 0.3 oz (87.1 kg)   SpO2 95%   BMI 29.20 kg/m²   TEMPERATURE:  Current - Temp: 98 °F (36.7 °C);  Max - Temp  Av.2 °F (36.8 °C)  Min: 97.7 °F (36.5 °C)  Max: 98.8 °F (37.1 °C)  PULSE OXIMETRY RANGE: SpO2  Av.5 %  Min: 91 %  Max: 95 %  24HR INTAKE/OUTPUT:      Intake/Output Summary (Last 24 hours) at 3/27/2022 1224  Last data filed at 3/27/2022 0759  Gross per 24 hour   Intake 401.67 ml   Output 700 ml   Net -298.33 ml       CONSTITUTIONAL: awake, alert, cooperative, no apparent distress   EYES: pupils equal, round and reactive to light, sclera clear and conjunctiva normal  ENT: Normocephalic, without obvious abnormality, atraumatic  NECK: supple, symmetrical, no jugular venous distension and no carotid bruits   HEMATOLOGIC/LYMPHATIC: no cervical, supraclavicular or axillary lymphadenopathy   LUNGS: no increased work of breathing and clear to auscultation   CARDIOVASCULAR: regular rate and rhythm, normal S1 and S2, no murmur noted  ABDOMEN: normal bowel sounds x 4, soft, non-distended, non-tender, no masses palpated, no hepatosplenomgaly   MUSCULOSKELETAL: full range of motion noted, tone is normal  NEUROLOGIC: awake, alert, oriented to name, place and time. Motor skills grossly intact. SKIN: Normal skin color, texture, turgor and no jaundice. appears intact   EXTREMITIES: no LE edema       Data      Recent Labs     03/25/22 0040 03/25/22 0040 03/26/22  0523 03/26/22 2056 03/27/22  0535   WBC 20.5*  --  14.2*  --  13.0*   HGB 7.8*   < > 6.6* 8.7* 7.9*  7.8*   HCT 22.0*   < > 18.7* 24.7* 23.2*  22.2*   *  --  381  --  415   MCV 93.7  --  96.0  --  96.2    < > = values in this interval not displayed.         Recent Labs     03/25/22 0040 03/27/22  0535    138   K 4.4 4.3    101   CO2 25 27   BUN 8 7   CREATININE 0.8* 0.7*     Recent Labs     03/25/22 0040   AST 38*   ALT 14   BILITOT 6.5*   ALKPHOS 96       Magnesium:    Lab Results   Component Value Date    MG 2.10 01/16/2022    MG 2.00 09/13/2021    MG 1.80 05/05/2021         Problem List  Patient Active Problem List   Diagnosis    [facility] [address]    Asthma    Sickle cell anemia (HCC)    Sepsis ([facility] [address]    Opiate overdose ([facility] [address]    Opiate antagonist poisoning, accidental or unintentional, initial encounter    Leukocytosis    Hypoxia    Anemia    Other chest pain    Pneumonia due to infectious organism    Fever    Chronic prescription opiate use    Right leg pain    Dental infection    Sickle cell crisis ([facility] [address]       ASSESSMENT AND PLAN:    Sickle cell anemia:  -The patient is been on Crizanilizumab, But this was felt to be ineffective and discontinued in September 2021  -He is currently on Hydrea 1000 mg daily  -Unfortunately, he has a very low pain tolerance and comes to the emergency room frequently  -We have to fill his pain medications weekly or he is not responsible with them.  -He does not complain of having a crisis this morning, but just jaw pain  -He does not need a transfusion now, but he may in a day or 2        Priapism:  -Earlier this week  -Resolved per the patient     Jaw pain:  -He is currently on clindamycin as prescribed in the emergency department  -Oral surgeon was consulted, but it may seem there is no one here on staff.  -If he progresses, he will need to be transferred.   Fortunately, he is actually improving.  -His facial swelling continues to improve.  -He will likely need to see oral surgery as an outpatient    Pain:  -Would continue his oxycodone as needed at this time  -He was given Toradol in the ER, but this could cause bleeding if oral surgery wants to do any procedure and probably would hold off on this        -He does not appear to need any additional pain medication  -If his pain needs accelerate, I would add 15 mg of MS Contin p.o. every 12    ONCOLOGIC DISPOSITION:    -Oncology barriers to discharge  Jasmin Gonzalez MD  Please contact through [facility]

## 2022-03-27 NOTE — CONSULTS
Infectious Diseases   Consult Note        Admission Date: 3/24/2022  Hospital Day: Hospital Day: 4   Attending: Rocky Reese MD  Date of service: 3/27/22     Reason for admission: Sickle cell crisis ([address] [D57.00]  Dental infection [K04.7]  Sickle cell pain crisis [facility]) [D57.00]    Chief complaint/ Reason for consult: Severe dental infection    Microbiology:        I have reviewed allavailable micro lab data and cultures    · Blood culture (2/2) - collected on 3/24/2022: negative      Antibiotics and immunizations:       Current antibiotics: All antibiotics and their doses were reviewed by me    Recent Abx Admin                   penicillin v potassium (VEETID) 250 MG/5ML suspension 500 mg (mg) 500 mg Given 03/27/22 1144     500 mg Given  0650     500 mg Given  0103     500 mg Given 03/26/22 2044     500 mg Given  1412    clindamycin (CLEOCIN) 600 mg in dextrose 5 % 50 mL IVPB (mg) 600 mg New Bag 03/27/22 0806     600 mg New Bag  0239     600 mg New Bag 03/26/22 2022                  Immunization History: All immunization history was reviewed by me today. Immunization History   Administered Date(s) Administered    DTaP, 5 Pertussis Antigens (Daptacel) 1999, 11/02/2000, 02/28/2001, 10/05/2004, 10/28/2011    Hepatitis A 04/15/2011, 10/28/2011    Influenza Vaccine, unspecified formulation 09/21/2012, 11/28/2017, 01/25/2019    MMR 11/02/2000, 10/05/2004    Meningococcal B, Recombinant Claude Newcomer) 02/27/2018, 07/31/2018, 01/25/2019    Meningococcal MCV4P (Menactra) 04/15/2011, 11/29/2016    Pneumococcal Conjugate 13-valent ([id number]) 04/15/2011    Pneumococcal Polysaccharide ([id number]) 07/05/2011    Tdap (Boostrix, Adacel) 10/28/2011, 09/20/2013    Varicella (Varivax) 10/05/2004, 04/15/2011       Known drug allergies:      All allergies were reviewed and updated    Allergies   Allergen Reactions    Morphine Shortness Of Breath     Other reaction(s): Histamine-like Reaction  Has asthma exacerbation with morphine-histamine type reaction         Social history:     Social History:  All social andepidemiologic history was reviewed and updated by me today as needed. · Tobacco use:   reports that he has never smoked. He has never used smokeless tobacco.  · Alcohol use:   reports previous alcohol use. · Currently lives in: [facility]  ·  reports no history of drug use. COVID VACCINATION AND LAB RESULT RECORDS:     Internal Administration   First Dose      Second Dose           Last COVID Lab SARS-CoV-2, PCR (no units)   Date Value   01/10/2021 Not Detected     SARS-CoV-2, KATARINA (no units)   Date Value   08/11/2020 NOT DETECTED     SARS-CoV-2, NAAT (no units)   Date Value   01/25/2022 Not Detected            Assessment:     The patient is a 24 y.o. old male who  has a past medical history of Accidental overdose, Asthma, DVT (deep venous thrombosis) ([facility] [address] (10/19/2021), Enlarged heart, 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 ([facility] [address] [facility] and Sickle cell anemia ([facility] [address] [facility] with following problems:    · Sepsis on admission with high fever and leukocytosis and hypotension  · Severe dental infection with dental caries and periapical lucencies about the left lower mandibular molar and left maxillary premolar areas  · Bilateral cervical reactive lymphadenopathy  · Sickle cell anemia  · History of DVT  · Asthma  · Overweight due to excess calorie intake : Body mass index is 29.2 kg/m². Discussion:      Patient was admitted with the sepsis with the fever, significant leukocytosis. He has history of sickle cell disease. He was found to have severe dental infection. He has been on IV clindamyci and oral penicillin VK    1 set of blood cultures were sent on day of admission and 1 set of blood culture was sent yesterday and they were negative.     Plan:     Diagnostic Workup:    · Agree with blood cultures  · Continue to follow fever curve, WBC count and blood cultures  · Follow up on liverand renal functions closely    Antimicrobials:    · Will stop IV clindamycin today  · Will stop oral penicillin  · Will order IV Unasyn 3 g every 6 hours  · Start oral probiotic twice daily  · We will follow up on the culture results and clinical progress and will make further recommendations accordingly. · If the patient continues to improve and the blood cultures remain negative, he can eventually be switched to oral Augmentin 875 mg every 12 hour at discharge  · Continue close vitals monitoring. · Maintain good glycemic control. · Fall precautions. Aspiration precautions. · Continue to watch for new fever or diarrhea. · DVT prophylaxis. · Discussed all above with patient and RN. Drug Monitoring:    · Continue serial monitoring for antibiotic toxicity as follows: CBC, CMP  · Continue to watch for following: new or worsening fever, hypotension, hives, lip swelling and redness or purulence at vascular access sites. I/v access Management:    · Continue to monitor i.v access sites for erythema, induration, discharge or tenderness. · As always, continue efforts to minimizetubes/lines/drains as clinically appropriate to reduce chances of line associated infections. Current isolation precautions:    Currently active isolation(s): Chemo       Level of complexity of consult: High     Risk of Complications/Morbidity: High     · Illness(es)/ Infection present that pose threat to life/bodily function. · There is potential for severe exacerbation of infection/side effects of treatment. · Therapy requires intensive monitoring for antimicrobial agent toxicity. Thank you for involving me in the care of your patient. I will continue to follow. If you have any additional questions, please do not hesitate to contact me.     Subjective:     Presenting complaint in ER:     Chief Complaint   Patient presents with    Back Pain     pt is a sickle cell patient     Dental Pain     swelling noted to face        HPI: [facility] Luisito Miller is a 24 y.o. male patient, who was seen at the request of Dr. Francesco Jauregui MD.    History was obtained from chart review and the patient. The patient was admitted on 3/24/2022. I have been consulted to see the patient for above mentioned reason(s). The patient has multiple medical comorbidities, and presented to the ER for fever, chills, back pain, swelling on the left side of the face in the cheek that started around 3 days ago. Patient's father thought that he likely had a tooth infection. The patient had some leftover amoxicillin and took half a tablet at home. His tooth pain was worsening and he was having fever with shaking chills. Hence, he came to [facility], ER and was admitted. He has history of sickle cell anemia and DVT. The patient was noted to have a fever 100.3 with elevated white cell count of 20,500 on admission. He was started on IV clindamycin and IV penicillin G. He had a CT scan of facial bones without contrast done on 3/25/2022 which showed dental caries and periapical lucencies about the left third mandibular molar and left maxillary premolar area. I have been asked for my opinion for management for this patient. Past Medical History: All past medical history reviewed today. Past Medical History:   Diagnosis Date    Accidental overdose     Asthma     DVT [facility]deep venous thrombosis) ([facility]) 10/19/2021    R leg    Enlarged heart     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)     Sickle cell anemia ([facility])          Past Surgical History: All pastsurgical history was reviewed today. Past Surgical History:   Procedure Laterality Date    SPLENECTOMY           Family History: All family history was reviewed today. Problem Relation Age of Onset    Other Father         sarcoidosis         Medications: All current and past medications were reviewed.     Medications Prior to Admission: apixaban (ELIQUIS) 5 MG TABS tablet, Take 1 tablet by mouth 2 times daily  pantoprazole (PROTONIX) 40 MG tablet, Take 1 tablet by mouth every morning (before breakfast)  folic acid (FOLVITE) 1 MG tablet, Take 2 tablets by mouth daily  oxyCODONE HCl (OXY-IR) 10 MG immediate release tablet, Take 10 mg by mouth every 4 hours as needed for Pain. albuterol sulfate HFA (PROAIR HFA) 108 (90 Base) MCG/ACT inhaler, Albuterol HFA Inhaler 90 mcg/actuation  inhaled  2 puffs prn     Active  levalbuterol (XOPENEX) 0.31 MG/3ML nebulization, Levalbuterol Nebulized    2 puffs prn     Active  docusate (COLACE, DULCOLAX) 100 MG CAPS, Take 100 mg by mouth  ibuprofen (ADVIL;MOTRIN) 800 MG tablet, ibuprofen 800 MG Oral Tablet  oral   prn    Active  hydroxyurea (HYDREA) 500 MG chemo capsule, Take 1,000 mg by mouth daily      ampicillin-sulbactam  3,000 mg IntraVENous Q6H    lactobacillus  1 capsule Oral BID WC    apixaban  5 mg Oral BID    folic acid  2 mg Oral Daily    hydroxyurea  1,000 mg Oral Daily    pantoprazole  40 mg Oral QAM AC    sodium chloride flush  5-40 mL IntraVENous 2 times per day          REVIEW OF SYSTEMS:       Review of Systems   Constitutional: Negative for chills, diaphoresis and fever. HENT: Positive for dental problem. Negative for ear discharge, ear pain, rhinorrhea, sore throat and trouble swallowing. Eyes: Negative for discharge and redness. Respiratory: Negative for cough, shortness of breath and wheezing. Cardiovascular: Negative for chest pain and leg swelling. Gastrointestinal: Negative for abdominal pain, constipation, diarrhea and nausea. Endocrine: Negative for polyuria. Genitourinary: Negative for dysuria, flank pain, frequency, hematuria and urgency. Musculoskeletal: Negative for back pain and myalgias. Skin: Negative for rash. Neurological: Negative for dizziness, seizures and headaches. Hematological: Does not bruise/bleed easily. Psychiatric/Behavioral: Negative for hallucinations and suicidal ideas.    All other systems reviewed and are negative. Objective:       PHYSICAL EXAM:      Vitals:   Vitals:    03/26/22 2018 03/26/22 2323 03/27/22 0444 03/27/22 0757   BP: 122/73 108/64 (!) 102/59 112/66   Pulse: 81 83 74 67   Resp: 15 16  16   Temp: 97.7 °F (36.5 °C) 98.2 °F (36.8 °C) 97.9 °F (36.6 °C) 98 °F (36.7 °C)   TempSrc: Axillary Oral  Oral   SpO2: 91% 93% 92% 95%   Weight:       Height:           Physical Exam  Vitals and nursing note reviewed. Constitutional:       Appearance: Normal appearance. He is well-developed. HENT:      Head: Normocephalic and atraumatic. Right Ear: External ear normal.      Left Ear: External ear normal.      Nose: Nose normal. No congestion or rhinorrhea. Mouth/Throat:      Mouth: Mucous membranes are moist.      Pharynx: No oropharyngeal exudate or posterior oropharyngeal erythema. Comments: Swelling and tenderness in the left mandibular area  Eyes:      General: No scleral icterus. Right eye: No discharge. Left eye: No discharge. Conjunctiva/sclera: Conjunctivae normal.      Pupils: Pupils are equal, round, and reactive to light. Cardiovascular:      Rate and Rhythm: Normal rate and regular rhythm. Pulses: Normal pulses. Heart sounds: No murmur heard. No friction rub. Pulmonary:      Effort: Pulmonary effort is normal. No respiratory distress. Breath sounds: Normal breath sounds. No stridor. No wheezing, rhonchi or rales. Abdominal:      General: Bowel sounds are normal.      Palpations: Abdomen is soft. Tenderness: There is no abdominal tenderness. There is no right CVA tenderness, left CVA tenderness, guarding or rebound. Musculoskeletal:         General: No swelling or tenderness. Normal range of motion. Cervical back: Normal range of motion and neck supple. No rigidity. No muscular tenderness. Lymphadenopathy:      Cervical: No cervical adenopathy. Skin:     General: Skin is warm and dry. Coloration: Skin is not jaundiced. Findings: No erythema or rash. Neurological:      General: No focal deficit present. Mental Status: He is alert and oriented to person, place, and time. Mental status is at baseline. Motor: No abnormal muscle tone. Psychiatric:         Mood and Affect: Mood normal.         Behavior: Behavior normal.         Thought Content: Thought content normal.           Lines and drains: All vascular access sites are healthy with no local erythema, discharge or tenderness. Intake and output:     I/O last 3 completed shifts: In: 401.7 [P.O.:100; Blood:301.7]  Out: 900 [Urine:900]    Lab Data:   All available labs were reviewed by me today. CBC:   Recent Labs     03/25/22 0040 03/25/22 0040 03/26/22 0523 03/26/22 2056 03/27/22  0535   WBC 20.5*  --  14.2*  --  13.0*   RBC 2.35*  --  1.95*  --  2.41*   HGB 7.8*   < > 6.6* 8.7* 7.9*  7.8*   HCT 22.0*   < > 18.7* 24.7* 23.2*  22.2*   *  --  381  --  415   MCV 93.7  --  96.0  --  96.2   MCH 33.3  --  33.7  --  32.7   MCHC 35.6  --  35.1  --  34.0   RDW 25.6*  --  23.0*  --  23.3*   NRBC 1*  --   --   --   --     < > = values in this interval not displayed. BMP:  Recent Labs     03/25/22 0040 03/27/22  0535    138   K 4.4 4.3    101   CO2 25 27   BUN 8 7   CREATININE 0.8* 0.7*   CALCIUM 9.5 9.4   GLUCOSE 93 101*        Hepatic FunctionPanel:   Lab Results   Component Value Date    ALKPHOS 96 03/25/2022    ALT 14 03/25/2022    AST 38 03/25/2022    PROT 7.5 03/25/2022    BILITOT 6.5 03/25/2022    BILIDIR 0.8 07/27/2021    IBILI 4.4 07/27/2021    LABALBU 4.9 03/25/2022       CPK:   Lab Results   Component Value Date    CKTOTAL 101 05/03/2021     ESR:   Lab Results   Component Value Date    SEDRATE 78 (H) 08/24/2020     CRP:   Lab Results   Component Value Date    CRP 34.6 (H) 08/24/2020         Imaging:     All pertinent images and reports for the current visit were reviewed by me during this visit.  I reviewed the chest x-ray/CT scan/MRI images and independently interpreted the findings and results today. CT FACIAL BONES WO CONTRAST   Final Result   1. Dental caries and periapical lucencies about the left 3rd mandibular molar   and left maxillary premolar with adjacent left pre maxillary facial swelling. No organized fluid collection seen. 2. Mildly enlarged bilateral cervical lymph nodes, probably reactive. Outside records:    Labs, Microbiology, Radiology and pertinent results from Care everywhere, if available, were reviewed as a part ofthe consultation. Problem list:       Patient Active Problem List   Diagnosis Code    Sickle cell pain crisis ([facility]) D57.00    Asthma J45.909    Sickle cell anemia ([facility]) D57.1    Sepsis ([address] A41.9    Opiate overdose ([facility] T40.601A    Opiate antagonist poisoning, accidental or unintentional, initial encounter T50.7X1A    Leukocytosis D72.829    Hypoxia R09.02    Anemia D64.9    Other chest pain R07.89    Pneumonia due to infectious organism J18.9    Fever R50.9    Chronic prescription opiate use Z79.891    Right leg pain M79.604    Dental infection K04.7    Sickle cell crisis ([facility] [address] D57.00    History of DVT in adulthood Z86.718    Overweight (BMI 25.0-29. 9) E66.3         Please note that this chart was generated using Dragon dictation software. Although every effort was made to ensure the accuracy of this automated transcription, some errors in transcription may have occurred inadvertently. If you may need any clarification, please do not hesitate to contact me through EPIC or at the phone number provided below with my electronic signature. Any pictures or media included in this note were obtained after taking informed verbal consent from the patient and with their approval to include those in the patient's medical record.         Berta Rojas MD, MPH, [address]  3/27/2022, 12:31 PM  [facility] Infectious Disease   [address] [facility], [address] [place] [address]  Office: [phone]  Fax: [phone]  Clinic days:  Tuesday & Thursday

## 2022-03-27 NOTE — PROGRESS NOTES
Contacted through [facility] message by nurse this morning regarding this patient. Patient reportedly has a dental infection. Otolaryngology has not been officially consulted for this patient yet. Oral surgery has apparently not responded to the request for consultation. I reviewed his CT scan which showed no evidence of abscess requiring drainage procedure. I advised nurse that otolaryngology does not treat dental infection. Advised her that generally this could be treated by the hospitalist with intravenous antibiotics and then follow-up with his dentist or oral surgeon. He stated he will put in consult for otolaryngology and I advised her that someone from the team can see the patient tomorrow, if necessary.

## 2022-03-27 NOTE — CARE COORDINATION
Chart reviewed day 2. Care managed per oncology and IM. Patient with no oncology bariers to d/c. Currently on IVATBX for dental infection. IPTA will likely have no DCP needs.  Dianna Jerry RN

## 2022-03-27 NOTE — PROGRESS NOTES
Hospitalist Progress Note      PCP: Elmira Washington MD    Date of Admission: 3/24/2022    Chief Complaint: Facial swelling with fevers and chills    Hospital Course: 24 y.o. male with a past medical history of sickle cell disease and DVT who presented to [facility] with complaints of facial swelling and fevers. Patient states he noticed the swelling began yesterday and worsened throughout the day. He also endorses left-sided tooth pain. States his pain is a dull ache and is worse with chewing. Rates his pain a 7 out of 10. States Oxycodone provided some relief. Denies radiation of pain. Reports he did take one dose of amoxicillin at home He denies chest pain, shortness of breath, abdominal pain, N/V/D, urinary symptoms, or any focal neurologic deficit. Subjective:   Seen resting in bed. Reports pain is well controlled at this time. States he feels the swelling in his face is improving. No other complaints at this time. VSS. Afebrile.        Medications:  Reviewed    Infusion Medications    sodium chloride      sodium chloride      sodium chloride 75 mL/hr at 03/27/22 0349     Scheduled Medications    ampicillin-sulbactam  3,000 mg IntraVENous Q6H    lactobacillus  1 capsule Oral BID WC    apixaban  5 mg Oral BID    folic acid  2 mg Oral Daily    hydroxyurea  1,000 mg Oral Daily    pantoprazole  40 mg Oral QAM AC    sodium chloride flush  5-40 mL IntraVENous 2 times per day     PRN Meds: sodium chloride, levalbuterol, albuterol sulfate HFA, sodium chloride flush, sodium chloride, ondansetron **OR** ondansetron, polyethylene glycol, acetaminophen **OR** acetaminophen, Lip Balm, HYDROmorphone **OR** HYDROmorphone, oxyCODONE HCl      Intake/Output Summary (Last 24 hours) at 3/27/2022 1401  Last data filed at 3/27/2022 0759  Gross per 24 hour   Intake 401.67 ml   Output 700 ml   Net -298.33 ml       Physical Exam Performed:    /66   Pulse 67   Temp 98 °F (36.7 °C) (Oral)   Resp 16   Ht 5' 8\" (1.727 m)   Wt 192 lb 0.3 oz (87.1 kg)   SpO2 95%   BMI 29.20 kg/m²     General appearance: No apparent distress, appears stated age and cooperative. HEENT: Left-sided facial swelling extending the periorbital space,  pupils equal, round, and reactive to light. Conjunctivae/corneas clear. Neck: Supple, with full range of motion. No jugular venous distention. Trachea midline. Respiratory:  Normal respiratory effort. Clear to auscultation, bilaterally without Rales/Wheezes/Rhonchi. Cardiovascular: Regular rate and rhythm with normal S1/S2 without murmurs, rubs or gallops. Abdomen: Soft, non-tender, non-distended with normal bowel sounds. Musculoskeletal: No clubbing, cyanosis or edema bilaterally. Full range of motion without deformity. Skin: Skin color, texture, turgor normal.  No rashes or lesions. Neurologic:  Neurovascularly intact without any focal sensory/motor deficits. Cranial nerves: II-XII intact, grossly non-focal.  Psychiatric: Alert and oriented, thought content appropriate, normal insight  Capillary Refill: Brisk,3 seconds, normal   Peripheral Pulses: +2 palpable, equal bilaterally       Labs:   Recent Labs     03/25/22  0040 03/25/22  0040 03/26/22  0523 03/26/22 2056 03/27/22  0535   WBC 20.5*  --  14.2*  --  13.0*   HGB 7.8*   < > 6.6* 8.7* 7.9*  7.8*   HCT 22.0*   < > 18.7* 24.7* 23.2*  22.2*   *  --  381  --  415    < > = values in this interval not displayed. Recent Labs     03/25/22  0040 03/27/22  0535    138   K 4.4 4.3    101   CO2 25 27   BUN 8 7   CREATININE 0.8* 0.7*   CALCIUM 9.5 9.4     Recent Labs     03/25/22  0040   AST 38*   ALT 14   BILITOT 6.5*   ALKPHOS 96     No results for input(s): INR in the last 72 hours. No results for input(s): Verlena Benjamin in the last 72 hours.     Urinalysis:      Lab Results   Component Value Date    NITRU Negative 03/04/2022    WBCUA 3-5 03/04/2022    BACTERIA 1+ 03/04/2022    RBCUA 0-2 03/04/2022 BLOODU TRACE-INTACT 03/04/2022    SPECGRAV 1.015 03/04/2022    GLUCOSEU Negative 03/04/2022       Radiology:  CT FACIAL BONES WO CONTRAST   Final Result   1. Dental caries and periapical lucencies about the left 3rd mandibular molar   and left maxillary premolar with adjacent left pre maxillary facial swelling. No organized fluid collection seen. 2. Mildly enlarged bilateral cervical lymph nodes, probably reactive. Assessment/Plan:    Active Hospital Problems    Diagnosis     History of DVT in adulthood [Z86.718]     Overweight (BMI 25.0-29. 9) [E66.3]     Dental infection [K04.7]     Sickle cell crisis ([facility]) [D57.00]     Sickle cell anemia ([facility]) [D57.1]     Sickle cell pain crisis ([facility] [address] [D57.00]      Sickle cell anemia  -Heme/onc following, appreciate recs, currently on Hydrea 1000 mg daily  -Continue to monitor CBC, hemoglobin stable   -Pain regimen with bowel regimen    Facial swelling with jaw and dental infection  -Started on Clindamycin and Penicillin 3/25  -Follow-up with Dentist/Oral surgery OP  -Tolerating diet  -ID consulted appreciate recs  -Facial swelling continues to improve     Leukocytosis   -Likely secondary to above  -Continue to trend CBC, WBC continues to trending down    Asthma, stable   -Resume home inhalers    History of recent priapism  -Resolved, treated by urology    History of DVT right gastrocnemius  -Continue home Eliquis    DVT Prophylaxis: Eliquis  Diet: ADULT DIET;  Regular  Code Status: Full Code    PT/OT Eval Status: Ordered    Dispo -likely 1-2 more days    STONEY Wellington

## 2022-03-27 NOTE — PROGRESS NOTES
Shift assessment completed. Pt A&O, VSS. Pts left side of face is swollen, pt c/o 8/10 pain - PRN medications given per MAR. Denies any further needs at this time. Bed locked and in lowest position. Call light & bedside table are within reach.

## 2022-03-27 NOTE — PROGRESS NOTES
Spoke with STONEY Shell and the plan is not to consult otolaryngology at this time.  Electronically signed by Juan R Miller RN on 3/27/2022 at 10:17 AM

## 2022-03-28 VITALS
BODY MASS INDEX: 29.1 KG/M2 | HEART RATE: 76 BPM | SYSTOLIC BLOOD PRESSURE: 128 MMHG | DIASTOLIC BLOOD PRESSURE: 75 MMHG | TEMPERATURE: 98.1 F | HEIGHT: 68 IN | RESPIRATION RATE: 18 BRPM | OXYGEN SATURATION: 95 % | WEIGHT: 192.02 LBS

## 2022-03-28 LAB
HCT VFR BLD CALC: 22.2 % (ref 40.5–52.5)
HEMOGLOBIN: 7.6 G/DL (ref 13.5–17.5)

## 2022-03-28 PROCEDURE — 36415 COLL VENOUS BLD VENIPUNCTURE: CPT

## 2022-03-28 PROCEDURE — 85014 HEMATOCRIT: CPT

## 2022-03-28 PROCEDURE — 6360000002 HC RX W HCPCS: Performed by: PHYSICIAN ASSISTANT

## 2022-03-28 PROCEDURE — 2580000003 HC RX 258: Performed by: INTERNAL MEDICINE

## 2022-03-28 PROCEDURE — 6370000000 HC RX 637 (ALT 250 FOR IP): Performed by: PHYSICIAN ASSISTANT

## 2022-03-28 PROCEDURE — 6360000002 HC RX W HCPCS: Performed by: INTERNAL MEDICINE

## 2022-03-28 PROCEDURE — 6370000000 HC RX 637 (ALT 250 FOR IP): Performed by: NURSE PRACTITIONER

## 2022-03-28 PROCEDURE — 85018 HEMOGLOBIN: CPT

## 2022-03-28 PROCEDURE — 99231 SBSQ HOSP IP/OBS SF/LOW 25: CPT | Performed by: INTERNAL MEDICINE

## 2022-03-28 PROCEDURE — 2580000003 HC RX 258: Performed by: NURSE PRACTITIONER

## 2022-03-28 PROCEDURE — 6370000000 HC RX 637 (ALT 250 FOR IP): Performed by: INTERNAL MEDICINE

## 2022-03-28 RX ORDER — AMOXICILLIN AND CLAVULANATE POTASSIUM 875; 125 MG/1; MG/1
1 TABLET, FILM COATED ORAL 2 TIMES DAILY
Qty: 14 TABLET | Refills: 0 | Status: SHIPPED | OUTPATIENT
Start: 2022-03-28 | End: 2022-04-04

## 2022-03-28 RX ADMIN — OXYCODONE HYDROCHLORIDE 20 MG: 15 TABLET ORAL at 14:11

## 2022-03-28 RX ADMIN — HYDROMORPHONE HYDROCHLORIDE 2 MG: 2 INJECTION, SOLUTION INTRAMUSCULAR; INTRAVENOUS; SUBCUTANEOUS at 06:23

## 2022-03-28 RX ADMIN — AMPICILLIN SODIUM AND SULBACTAM SODIUM 3000 MG: 2; 1 INJECTION, POWDER, FOR SOLUTION INTRAMUSCULAR; INTRAVENOUS at 06:24

## 2022-03-28 RX ADMIN — HYDROMORPHONE HYDROCHLORIDE 2 MG: 2 INJECTION, SOLUTION INTRAMUSCULAR; INTRAVENOUS; SUBCUTANEOUS at 09:32

## 2022-03-28 RX ADMIN — APIXABAN 5 MG: 5 TABLET, FILM COATED ORAL at 08:41

## 2022-03-28 RX ADMIN — OXYCODONE HYDROCHLORIDE 20 MG: 15 TABLET ORAL at 02:09

## 2022-03-28 RX ADMIN — PANTOPRAZOLE SODIUM 40 MG: 40 TABLET, DELAYED RELEASE ORAL at 06:23

## 2022-03-28 RX ADMIN — FOLIC ACID 2 MG: 1 TABLET ORAL at 08:41

## 2022-03-28 RX ADMIN — HYDROMORPHONE HYDROCHLORIDE 2 MG: 2 INJECTION, SOLUTION INTRAMUSCULAR; INTRAVENOUS; SUBCUTANEOUS at 12:40

## 2022-03-28 RX ADMIN — HYDROMORPHONE HYDROCHLORIDE 2 MG: 2 INJECTION, SOLUTION INTRAMUSCULAR; INTRAVENOUS; SUBCUTANEOUS at 16:03

## 2022-03-28 RX ADMIN — ACETAMINOPHEN 650 MG: 325 TABLET ORAL at 02:09

## 2022-03-28 RX ADMIN — SODIUM CHLORIDE, PRESERVATIVE FREE 10 ML: 5 INJECTION INTRAVENOUS at 08:43

## 2022-03-28 RX ADMIN — HYDROMORPHONE HYDROCHLORIDE 2 MG: 2 INJECTION, SOLUTION INTRAMUSCULAR; INTRAVENOUS; SUBCUTANEOUS at 03:09

## 2022-03-28 RX ADMIN — HYDROMORPHONE HYDROCHLORIDE 2 MG: 2 INJECTION, SOLUTION INTRAMUSCULAR; INTRAVENOUS; SUBCUTANEOUS at 00:06

## 2022-03-28 RX ADMIN — HYDROXYUREA 1000 MG: 500 CAPSULE ORAL at 08:34

## 2022-03-28 RX ADMIN — AMPICILLIN SODIUM AND SULBACTAM SODIUM 3000 MG: 2; 1 INJECTION, POWDER, FOR SOLUTION INTRAMUSCULAR; INTRAVENOUS at 00:21

## 2022-03-28 RX ADMIN — Medication 1 CAPSULE: at 08:41

## 2022-03-28 RX ADMIN — OXYCODONE HYDROCHLORIDE 20 MG: 15 TABLET ORAL at 08:38

## 2022-03-28 ASSESSMENT — PAIN SCALES - GENERAL
PAINLEVEL_OUTOF10: 0
PAINLEVEL_OUTOF10: 8
PAINLEVEL_OUTOF10: 8
PAINLEVEL_OUTOF10: 7
PAINLEVEL_OUTOF10: 6
PAINLEVEL_OUTOF10: 8
PAINLEVEL_OUTOF10: 7
PAINLEVEL_OUTOF10: 6
PAINLEVEL_OUTOF10: 8
PAINLEVEL_OUTOF10: 7
PAINLEVEL_OUTOF10: 6
PAINLEVEL_OUTOF10: 0

## 2022-03-28 ASSESSMENT — PAIN DESCRIPTION - LOCATION
LOCATION: FACE;JAW

## 2022-03-28 ASSESSMENT — ENCOUNTER SYMPTOMS
NAUSEA: 0
WHEEZING: 0
EYE DISCHARGE: 0
DIARRHEA: 0
COUGH: 0
BACK PAIN: 0
RHINORRHEA: 0
ABDOMINAL PAIN: 0
EYE REDNESS: 0
SHORTNESS OF BREATH: 0
SORE THROAT: 0
CONSTIPATION: 0
TROUBLE SWALLOWING: 0

## 2022-03-28 ASSESSMENT — PAIN DESCRIPTION - ORIENTATION
ORIENTATION: LEFT

## 2022-03-28 ASSESSMENT — PAIN DESCRIPTION - FREQUENCY: FREQUENCY: CONTINUOUS

## 2022-03-28 ASSESSMENT — PAIN DESCRIPTION - PAIN TYPE
TYPE: ACUTE PAIN

## 2022-03-28 ASSESSMENT — PAIN DESCRIPTION - PROGRESSION: CLINICAL_PROGRESSION: GRADUALLY WORSENING

## 2022-03-28 ASSESSMENT — PAIN DESCRIPTION - DESCRIPTORS: DESCRIPTORS: ACHING;DISCOMFORT

## 2022-03-28 NOTE — PROGRESS NOTES
ONCOLOGY HEMATOLOGY CARE PROGRESS NOTE      SUBJECTIVE:     The patient states that his facial pain is much better. ROS:     Constitutional:  No weight loss, No fever, No chills, No night sweats. Energy level good. Eyes:  No impairment or change in vision  ENT / Mouth: He states his pain is much better in the left facial and jaw area. Cardiovascular:  No chest pain, palpitations, new edema, or calf discomfort  Respiratory:  No pain, hemoptysis, change to breathing  Breast:  No pain, discharge, change in appearance or texture  Gastrointestinal:  No pain, cramping, jaundice, change to eating and bowel habits  Urinary:  No pain, bleeding or change in continence  Genitalia: No pain, bleeding or discharge  Musculoskeletal:  No redness, pain, edema or weakness  Skin:  No pruritus, rash, change to nodules or lesions  Neurologic:  No discomfort, change in mental status, speech, sensory or motor activity  Psychiatric:  No change in concentration or change to affect or mood  Endocrine:  No hot flashes, increased thirst, or change to urine production  Hematologic: No petechiae, ecchymosis or bleeding  Lymphatic:  No lymphadenopathy or lymphedema  Allergy / Immunologic:  No eczema, hives, frequent or recurrent infections    OBJECTIVE        Physical    VITALS:  /67   Pulse 91   Temp 98.1 °F (36.7 °C) (Oral)   Resp 16   Ht 5' 8\" (1.727 m)   Wt 192 lb 0.3 oz (87.1 kg)   SpO2 93%   BMI 29.20 kg/m²   TEMPERATURE:  Current - Temp: 98.1 °F (36.7 °C);  Max - Temp  Av.9 °F (36.6 °C)  Min: 97.7 °F (36.5 °C)  Max: 98.1 °F (36.7 °C)  PULSE OXIMETRY RANGE: SpO2  Av %  Min: 93 %  Max: 95 %  24HR INTAKE/OUTPUT:      Intake/Output Summary (Last 24 hours) at 3/28/2022 0813  Last data filed at 3/28/2022 5886  Gross per 24 hour   Intake 7327 ml   Output 2400 ml   Net 4927 ml       CONSTITUTIONAL: awake, alert, cooperative, no apparent distress   EYES: pupils equal, round and reactive to light, sclera clear and conjunctiva normal  ENT: The left facial swelling is greatly improved. NECK: supple, symmetrical, no jugular venous distension and no carotid bruits   HEMATOLOGIC/LYMPHATIC: no cervical, supraclavicular or axillary lymphadenopathy   LUNGS: no increased work of breathing and clear to auscultation   CARDIOVASCULAR: regular rate and rhythm, normal S1 and S2, no murmur noted  ABDOMEN: normal bowel sounds x 4, soft, non-distended, non-tender, no masses palpated, no hepatosplenomgaly   MUSCULOSKELETAL: full range of motion noted, tone is normal  NEUROLOGIC: awake, alert, oriented to name, place and time. Motor skills grossly intact. SKIN: Normal skin color, texture, turgor and no jaundice. appears intact   EXTREMITIES: no LE edema       Data      Recent Labs     03/26/22 0523 03/26/22 2056 03/27/22  0535   WBC 14.2*  --  13.0*   HGB 6.6* 8.7* 7.9*  7.8*   HCT 18.7* 24.7* 23.2*  22.2*     --  415   MCV 96.0  --  96.2        Recent Labs     03/27/22  0535      K 4.3      CO2 27   BUN 7   CREATININE 0.7*     No results for input(s): AST, ALT, ALB, BILIDIR, BILITOT, ALKPHOS in the last 72 hours. Magnesium:    Lab Results   Component Value Date    MG 2.10 01/16/2022    MG 2.00 09/13/2021    MG 1.80 05/05/2021         Problem List  Patient Active Problem List   Diagnosis    [facility] crisis [facility] [address]    Asthma    Sickle cell anemia (HCC)    Sepsis ([facility] [address]    Opiate overdose ([facility] [address]    Opiate antagonist poisoning, accidental or unintentional, initial encounter    Leukocytosis    Hypoxia    Anemia    Other chest pain    Pneumonia due to infectious organism    Fever    Chronic prescription opiate use    Right leg pain    Dental infection    Sickle cell crisis ([facility] [address]    History of DVT in adulthood    Overweight (BMI 25.0-29. 9)       ASSESSMENT AND PLAN:    Sickle cell anemia:  -The patient is been on Crizanilizumab, But this was felt to be ineffective and discontinued in September 2021  -He is currently on Hydrea 1000 mg daily  -Unfortunately, he has a very low pain tolerance and comes to the emergency room frequently  -We have to fill his pain medications weekly or he is not responsible with them.  -He does not complain of having a crisis this morning, but just jaw pain  -He is status post transfusion  -His hemoglobin is 7.9 yesterday and pending from today  -This is satisfactory        Priapism:  -Earlier this week  -Resolved per the patient     Jaw pain:  -He is currently on clindamycin as prescribed in the emergency department  -Oral surgeon was consulted, but it may seem there is no one here on staff.  -If he progresses, he will need to be transferred.   Fortunately, he is actually improving.  -His facial swelling continues to improve.  -He will likely need to see oral surgery as an outpatient    Pain:  -Would continue his oxycodone as needed at this time  -Oxycodone was electronically prescribed so he may pick this up at discharge        ONCOLOGIC DISPOSITION:    -Oncology barriers to discharge  Elizabeth Morse MD  Please contact through [facility]

## 2022-03-28 NOTE — PROGRESS NOTES
Shift assessment completed and charted. VSS. Pt c/o for pain, prn pain meds given, see MAR. Swelling still noted to Right check. Bed locked and in lowest position. Call light within reach. Pt denies any other needs at this time. Will continue to monitor.

## 2022-03-28 NOTE — PROGRESS NOTES
Discharge: Pt discharged to home as per order. IV removed. Scripts plus instructions given. Pt verbalized understanding. Denied questions. All belongings sent with pt.

## 2022-03-28 NOTE — PROGRESS NOTES
Hospitalist Progress Note      PCP: Wing Osborn MD    Date of Admission: 3/24/2022    Chief Complaint: Facial swelling with fevers and chills    Hospital Course: 24 y.o. male with a past medical history of sickle cell disease and DVT who presented to [facility] with complaints of facial swelling and fevers. Patient states he noticed the swelling began yesterday and worsened throughout the day. He also endorses left-sided tooth pain. States his pain is a dull ache and is worse with chewing. Rates his pain a 7 out of 10. States Oxycodone provided some relief. Denies radiation of pain. Reports he did take one dose of amoxicillin at home He denies chest pain, shortness of breath, abdominal pain, N/V/D, urinary symptoms, or any focal neurologic deficit.       Subjective: Resting in bed, Left facial swelling improving, pain controlled        Medications:  Reviewed    Infusion Medications    sodium chloride      sodium chloride      sodium chloride 1,000 mL (03/27/22 7103)     Scheduled Medications    ampicillin-sulbactam  3,000 mg IntraVENous Q6H    lactobacillus  1 capsule Oral BID WC    apixaban  5 mg Oral BID    folic acid  2 mg Oral Daily    hydroxyurea  1,000 mg Oral Daily    pantoprazole  40 mg Oral QAM AC    sodium chloride flush  5-40 mL IntraVENous 2 times per day     PRN Meds: sodium chloride, levalbuterol, albuterol sulfate HFA, sodium chloride flush, sodium chloride, ondansetron **OR** ondansetron, polyethylene glycol, acetaminophen **OR** acetaminophen, Lip Balm, HYDROmorphone **OR** HYDROmorphone, oxyCODONE HCl      Intake/Output Summary (Last 24 hours) at 3/28/2022 1058  Last data filed at 3/28/2022 0850  Gross per 24 hour   Intake 7136 ml   Output 2400 ml   Net 4736 ml       Physical Exam Performed:    /66   Pulse 67   Temp 97.7 °F (36.5 °C) (Oral)   Resp 16   Ht 5' 8\" (1.727 m)   Wt 192 lb 0.3 oz (87.1 kg)   SpO2 94%   BMI 29.20 kg/m²     General appearance: No apparent distress, appears stated age and cooperative. HEENT: Left-sided facial swelling extending the periorbital space,  pupils equal, round, and reactive to light. Conjunctivae/corneas clear. Neck: Supple, with full range of motion. No jugular venous distention. Trachea midline. Respiratory:  Normal respiratory effort. Clear to auscultation, bilaterally without Rales/Wheezes/Rhonchi. Cardiovascular: Regular rate and rhythm with normal S1/S2 without murmurs, rubs or gallops. Abdomen: Soft, non-tender, non-distended with normal bowel sounds. Musculoskeletal: No clubbing, cyanosis or edema bilaterally. Full range of motion without deformity. Skin: Skin color, texture, turgor normal.  No rashes or lesions. Neurologic:  Neurovascularly intact without any focal sensory/motor deficits. Cranial nerves: II-XII intact, grossly non-focal.  Psychiatric: Alert and oriented, thought content appropriate, normal insight  Capillary Refill: Brisk,3 seconds, normal   Peripheral Pulses: +2 palpable, equal bilaterally       Labs:   Recent Labs     03/26/22  0523 03/26/22  0523 03/26/22 2056 03/27/22  0535 03/28/22  0947   WBC 14.2*  --   --  13.0*  --    HGB 6.6*   < > 8.7* 7.9*  7.8* 7.6*   HCT 18.7*   < > 24.7* 23.2*  22.2* 22.2*     --   --  415  --     < > = values in this interval not displayed. Recent Labs     03/27/22  0535      K 4.3      CO2 27   BUN 7   CREATININE 0.7*   CALCIUM 9.4     No results for input(s): AST, ALT, BILIDIR, BILITOT, ALKPHOS in the last 72 hours. No results for input(s): INR in the last 72 hours. No results for input(s): [place] in the last 72 hours.     Urinalysis:      Lab Results   Component Value Date    NITRU Negative 03/04/2022    WBCUA 3-5 03/04/2022    BACTERIA 1+ 03/04/2022    RBCUA 0-2 03/04/2022    BLOODU TRACE-INTACT 03/04/2022    SPECGRAV 1.015 03/04/2022    GLUCOSEU Negative 03/04/2022       Radiology:  CT FACIAL BONES WO CONTRAST   Final Result   1. Dental caries and periapical lucencies about the left 3rd mandibular molar   and left maxillary premolar with adjacent left pre maxillary facial swelling. No organized fluid collection seen. 2. Mildly enlarged bilateral cervical lymph nodes, probably reactive. Assessment/Plan:    Active Hospital Problems    Diagnosis     History of DVT in adulthood [Z86.718]     Overweight (BMI 25.0-29. 9) [E66.3]     Dental infection [K04.7]     S[facility] crisis ([facility]) [D57.00]     Sickle cell anemia ([facility]) [D57.1]     Sickle cell pain crisis ([facility] [address] [D57.00]      Sickle cell anemia  -Heme/onc following, appreciate recs, currently on Hydrea 1000 mg daily  -Continue to monitor CBC, hemoglobin stable, hgb 7.6  -Pain regimen with bowel regimen    Facial swelling with jaw and dental infection  -Started on Clindamycin and Penicillin 3/25  -Follow-up with Dentist/Oral surgery OP  -Tolerating diet  -ID consulted-abx changed to Unasyn, chg to Augmentin at discharge  -Facial swelling continues to improve     Leukocytosis   -Likely secondary to above  -Continue to trend CBC, WBC continues to trending down    Asthma, stable   -Resume home inhalers    History of recent priapism  -Resolved, treated by urology    History of DVT right gastrocnemius  -Continue home Eliquis    DVT Prophylaxis: Eliquis  Diet: ADULT DIET;  Regular  Code Status: Full Code    PT/OT Eval Status: Ordered    Dispo -possibly 3/28 or 3/29    CARMELITA Quigley - CNP

## 2022-03-28 NOTE — PROGRESS NOTES
Infectious Disease Follow up Notes    CC :  Odontogenic infection and facial cellulitis     Antibiotics:   Unasyn 3g q6  culturelle     Admit Date:   3/24/2022  Hospital Day: 5    Subjective:   He remains AF   Tolerating regular diet   Pain controlled  He is eager for discharge     Objective:     Patient Vitals for the past 8 hrs:   BP Temp Temp src Pulse Resp SpO2   03/28/22 1504 128/75 98.1 °F (36.7 °C) Oral 76 18 95 %   03/28/22 0837 106/66 97.7 °F (36.5 °C) Oral 67 16 94 %       EXAM:  General:   Alert, oriented, flat affect    HEENT:   L facial swelling  No trismus  Dental carries, cracked molar, no abscess evident   NECK:   +cervical LAD       LUNGS:   Non-labored breathing        LINE:   PIV in place         Scheduled Meds:   ampicillin-sulbactam  3,000 mg IntraVENous Q6H    lactobacillus  1 capsule Oral BID WC    apixaban  5 mg Oral BID    folic acid  2 mg Oral Daily    hydroxyurea  1,000 mg Oral Daily    pantoprazole  40 mg Oral QAM AC    sodium chloride flush  5-40 mL IntraVENous 2 times per day       Continuous Infusions:   sodium chloride      sodium chloride      sodium chloride 1,000 mL (03/27/22 5551)          Data Review:    Lab Results   Component Value Date    WBC 13.0 (H) 03/27/2022    HGB 7.6 (L) 03/28/2022    HCT 22.2 (L) 03/28/2022    MCV 96.2 03/27/2022     03/27/2022     Lab Results   Component Value Date    CREATININE 0.7 (L) 03/27/2022    BUN 7 03/27/2022     03/27/2022    K 4.3 03/27/2022     03/27/2022    CO2 27 03/27/2022       Hepatic Function Panel:   Lab Results   Component Value Date    ALKPHOS 96 03/25/2022    ALT 14 03/25/2022    AST 38 03/25/2022    PROT 7.5 03/25/2022    BILITOT 6.5 03/25/2022    BILIDIR 0.8 07/27/2021    IBILI 4.4 07/27/2021    LABALBU 4.9 03/25/2022         MICRO:  3/25 BC x1 NGTD  3/26 BC x1 NGTD       IMAGING:  CT facial 3/25/22  Impression   1.  Dental caries and periapical lucencies about the left 3rd mandibular molar   and left maxillary premolar with adjacent left pre maxillary facial swelling. No organized fluid collection seen. 2. Mildly enlarged bilateral cervical lymph nodes, probably reactive. Assessment:     Patient Active Problem List    Diagnosis Date Noted    History of DVT in adulthood     Overweight (BMI 25.0-29. 9)     Dental infection 03/25/2022    Sickle cell crisis ([facility] [address] 03/25/2022    Right leg pain 10/17/2021    Fever 01/05/2021    Chronic prescription opiate use 01/05/2021    Pneumonia due to infectious organism     Leukocytosis 12/02/2020    Hypoxia 12/02/2020    Anemia 12/02/2020    Other chest pain 12/02/2020    Opiate overdose ([address] 09/26/2020    Opiate antagonist poisoning, accidental or unintentional, initial encounter 09/26/2020    Sepsis ([facility] [address] 08/06/2020    Sickle cell anemia ([facility] [address] 07/28/2020    Sickle cell pain crisis ([facility] [address] 07/25/2020    Asthma 07/25/2020       Odontogenic infection with facial cellulitis  Clinically improved  BC negative to date    -change to po Augmentin and continue for another week, Rx sent to [place]  -needs to see oral surgery for teeth extraction         Discussed with patient/family, all questions answered        Sangeeta Mora MD  Phone: [phone]   Fax : [phone]

## 2022-03-29 LAB
BLOOD BANK DISPENSE STATUS: NORMAL
BLOOD BANK DISPENSE STATUS: NORMAL
BLOOD BANK PRODUCT CODE: NORMAL
BLOOD BANK PRODUCT CODE: NORMAL
BLOOD CULTURE, ROUTINE: NORMAL
BPU ID: NORMAL
BPU ID: NORMAL
DESCRIPTION BLOOD BANK: NORMAL
DESCRIPTION BLOOD BANK: NORMAL

## 2022-03-30 LAB — CULTURE, BLOOD 2: NORMAL

## 2022-04-26 ENCOUNTER — HOSPITAL ENCOUNTER (EMERGENCY)
Age: 22
Discharge: HOME | End: 2022-04-26
Attending: EMERGENCY MEDICINE
Payer: COMMERCIAL

## 2022-04-26 ENCOUNTER — APPOINTMENT (OUTPATIENT)
Dept: GENERAL RADIOLOGY | Age: 22
End: 2022-04-26
Payer: COMMERCIAL

## 2022-04-26 VITALS
HEIGHT: 68 IN | OXYGEN SATURATION: 95 % | RESPIRATION RATE: 16 BRPM | WEIGHT: 185 LBS | HEART RATE: 82 BPM | SYSTOLIC BLOOD PRESSURE: 111 MMHG | BODY MASS INDEX: 28.04 KG/M2 | DIASTOLIC BLOOD PRESSURE: 54 MMHG | TEMPERATURE: 98.2 F

## 2022-04-26 DIAGNOSIS — Z86.2 HX OF SICKLE CELL DISEASE: ICD-10-CM

## 2022-04-26 DIAGNOSIS — M25.551 RIGHT HIP PAIN: Primary | ICD-10-CM

## 2022-04-26 LAB
A/G RATIO: 1.8 (ref 1.1–2.2)
ALBUMIN SERPL-MCNC: 4.7 G/DL (ref 3.4–5)
ALP BLD-CCNC: 94 U/L (ref 40–129)
ALT SERPL-CCNC: 12 U/L (ref 10–40)
ANION GAP SERPL CALCULATED.3IONS-SCNC: 11 MMOL/L (ref 3–16)
ANISOCYTOSIS: ABNORMAL
AST SERPL-CCNC: 30 U/L (ref 15–37)
ATYPICAL LYMPHOCYTE RELATIVE PERCENT: 2 % (ref 0–6)
BANDED NEUTROPHILS RELATIVE PERCENT: 5 % (ref 0–7)
BASOPHILS ABSOLUTE: 0 K/UL (ref 0–0.2)
BASOPHILS RELATIVE PERCENT: 0 %
BILIRUB SERPL-MCNC: 6.8 MG/DL (ref 0–1)
BUN BLDV-MCNC: 6 MG/DL (ref 7–20)
CALCIUM SERPL-MCNC: 10.5 MG/DL (ref 8.3–10.6)
CHLORIDE BLD-SCNC: 104 MMOL/L (ref 99–110)
CO2: 25 MMOL/L (ref 21–32)
CREAT SERPL-MCNC: 0.6 MG/DL (ref 0.9–1.3)
EOSINOPHILS ABSOLUTE: 0 K/UL (ref 0–0.6)
EOSINOPHILS RELATIVE PERCENT: 0 %
GFR AFRICAN AMERICAN: >60
GFR NON-AFRICAN AMERICAN: >60
GLUCOSE BLD-MCNC: 101 MG/DL (ref 70–99)
HCT VFR BLD CALC: 24.5 % (ref 40.5–52.5)
HEMOGLOBIN: 8.6 G/DL (ref 13.5–17.5)
IMMATURE RETIC FRACT: 0.67 (ref 0.21–0.37)
LYMPHOCYTES ABSOLUTE: 2 K/UL (ref 1–5.1)
LYMPHOCYTES RELATIVE PERCENT: 8 %
MACROCYTES: ABNORMAL
MCH RBC QN AUTO: 34.4 PG (ref 26–34)
MCHC RBC AUTO-ENTMCNC: 35.1 G/DL (ref 31–36)
MCV RBC AUTO: 97.8 FL (ref 80–100)
MICROCYTES: ABNORMAL
MONOCYTES ABSOLUTE: 2.2 K/UL (ref 0–1.3)
MONOCYTES RELATIVE PERCENT: 11 %
NEUTROPHILS ABSOLUTE: 15.5 K/UL (ref 1.7–7.7)
NEUTROPHILS RELATIVE PERCENT: 74 %
NUCLEATED RED BLOOD CELLS: 3 /100 WBC
OVALOCYTES: ABNORMAL
PDW BLD-RTO: 23.3 % (ref 12.4–15.4)
PLATELET # BLD: 412 K/UL (ref 135–450)
PMV BLD AUTO: 8.2 FL (ref 5–10.5)
POIKILOCYTES: ABNORMAL
POLYCHROMASIA: ABNORMAL
POTASSIUM SERPL-SCNC: 4.2 MMOL/L (ref 3.5–5.1)
RBC # BLD: 2.5 M/UL (ref 4.2–5.9)
RETICULOCYTE ABSOLUTE COUNT: 0.33 M/UL
RETICULOCYTE COUNT PCT: 13.31 % (ref 0.5–2.18)
SCHISTOCYTES: ABNORMAL
SICKLE CELLS: ABNORMAL
SODIUM BLD-SCNC: 140 MMOL/L (ref 136–145)
TARGET CELLS: ABNORMAL
TOTAL PROTEIN: 7.3 G/DL (ref 6.4–8.2)
WBC # BLD: 19.6 K/UL (ref 4–11)

## 2022-04-26 PROCEDURE — 96374 THER/PROPH/DIAG INJ IV PUSH: CPT

## 2022-04-26 PROCEDURE — 80053 COMPREHEN METABOLIC PANEL: CPT

## 2022-04-26 PROCEDURE — 99284 EMERGENCY DEPT VISIT MOD MDM: CPT

## 2022-04-26 PROCEDURE — 85045 AUTOMATED RETICULOCYTE COUNT: CPT

## 2022-04-26 PROCEDURE — 73502 X-RAY EXAM HIP UNI 2-3 VIEWS: CPT

## 2022-04-26 PROCEDURE — 6360000002 HC RX W HCPCS: Performed by: EMERGENCY MEDICINE

## 2022-04-26 PROCEDURE — 6370000000 HC RX 637 (ALT 250 FOR IP): Performed by: EMERGENCY MEDICINE

## 2022-04-26 PROCEDURE — 85025 COMPLETE CBC W/AUTO DIFF WBC: CPT

## 2022-04-26 RX ORDER — OXYCODONE HCL 10 MG/1
10 TABLET, FILM COATED, EXTENDED RELEASE ORAL ONCE
Status: COMPLETED | OUTPATIENT
Start: 2022-04-26 | End: 2022-04-26

## 2022-04-26 RX ORDER — KETOROLAC TROMETHAMINE 30 MG/ML
30 INJECTION, SOLUTION INTRAMUSCULAR; INTRAVENOUS ONCE
Status: COMPLETED | OUTPATIENT
Start: 2022-04-26 | End: 2022-04-26

## 2022-04-26 RX ADMIN — KETOROLAC TROMETHAMINE 30 MG: 30 INJECTION, SOLUTION INTRAMUSCULAR at 10:42

## 2022-04-26 RX ADMIN — OXYCODONE HYDROCHLORIDE 10 MG: 10 TABLET, FILM COATED, EXTENDED RELEASE ORAL at 09:17

## 2022-04-26 ASSESSMENT — PAIN DESCRIPTION - ORIENTATION: ORIENTATION: RIGHT

## 2022-04-26 ASSESSMENT — PAIN SCALES - GENERAL
PAINLEVEL_OUTOF10: 9

## 2022-04-26 ASSESSMENT — PAIN DESCRIPTION - PAIN TYPE: TYPE: CHRONIC PAIN

## 2022-04-26 ASSESSMENT — PAIN - FUNCTIONAL ASSESSMENT: PAIN_FUNCTIONAL_ASSESSMENT: 0-10

## 2022-04-26 ASSESSMENT — PAIN DESCRIPTION - LOCATION: LOCATION: HIP

## 2022-04-26 NOTE — ED NOTES
@9222 Called Oncology (couldn't get ahold of anyone) Per Marcellus Kidd.  SS disease  @8936 PS   @1958  called and spoke to Ofelia Rosario

## 2022-04-26 NOTE — PROGRESS NOTES
Physician Progress Note      PATIENT:               Rosie Allen  CSN #:                  [id number]  :                       2000  ADMIT DATE:       3/24/2022 11:17 PM  [address] [place] DATE:        3/28/2022 4:26 PM  RESPONDING  PROVIDER #:        Gilberto Quevedo MD          QUERY TEXT:    Pt admitted with facial cellulitis and dental abscess. Noted documentation of   sepsis by ordered ID consultant. If possible, please document in progress   notes and discharge summary:    The medical record reflects the following:  Risk Factors: facial cellulitis, dental abscess  Clinical Indicators: WBC 20.5, lactic acid 0.8, vitals on 3/24 at 2318-3: T   100.3, HR 59-89, SPO2 %, RR 16-20, SBP   Treatment: IV Clindamycin, discharged on PO Penicillin, IVF, serial labs,   cultures, ID consult, supportive care    Thank you,  Kathleen Fernandez RN, CDS  [email or web address]  Options provided:  -- Sepsis confirmed present on admission  -- Sepsis ruled out  -- Other - I will add my own diagnosis  -- Disagree - Not applicable / Not valid  -- Disagree - Clinically unable to determine / Unknown  -- Refer to Clinical Documentation Reviewer    PROVIDER RESPONSE TEXT:    The diagnosis of sepsis was confirmed as present on admission.     Query created by: Radha Brooks on 3/30/2022 2:02 PM      Electronically signed by:  Gilberto Quevedo MD 2022 1:21 PM

## 2022-04-26 NOTE — ED PROVIDER NOTES
CHIEF COMPLAINT  Hip Pain (right hip-ran out of oxycodone)      HISTORY OF PRESENT ILLNESS  Emma Griffin is a 24 y.o. male with a history of sickle cell anemia and chronic pain who presents to the ED complaining of right hip pain. Patient reports that he has chronic right hip pain and typically follows with otology/oncology. Patient states that he ran out of his pain medications today and took his last dose of oxycodone at approximately 4 AM.  Patient reports 9/10 pain upon arrival.  No chest pain, shortness of breath, or other complaints noted. .   No other complaints, modifying factors or associated symptoms. I have reviewed the following from the nursing documentation.     Past Medical History:   Diagnosis Date    Accidental overdose     Asthma     DVT (deep venous thrombosis) ([facility]) 10/19/2021    R leg    Enlarged heart     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 ([facility])     Sickle cell anemia ([facility])      Past Surgical History:   Procedure Laterality Date    SPLENECTOMY       Family History   Problem Relation Age of Onset    Other Father         sarcoidosis     Social History     Socioeconomic History    Marital status: Single     Spouse name: Not on file    Number of children: 0    Years of education: Not on file    Highest education level: Not on file   Occupational History    Not on file   Tobacco Use    Smoking status: Never Smoker    Smokeless tobacco: Never Used   Vaping Use    Vaping Use: Never used   Substance and Sexual Activity    Alcohol use: Not Currently    Drug use: Never    Sexual activity: Not Currently   Other Topics Concern    Not on file   Social History Narrative    Not on file     Social Determinants of Health     Financial Resource Strain:     Difficulty of Paying Living Expenses: Not on file   Food Insecurity:     Worried About Running Out of Food in the Last Year: Not on file    Laura of Food in the Last Year: Not on file   Transportation Needs:     Lack of Transportation (Medical): Not on file    Lack of Transportation (Non-Medical): Not on file   Physical Activity:     Days of Exercise per Week: Not on file    Minutes of Exercise per Session: Not on file   Stress:     Feeling of Stress : Not on file   Social Connections:     Frequency of Communication with Friends and Family: Not on file    Frequency of Social Gatherings with Friends and Family: Not on file    Attends Confucianism Services: Not on file    Active Member of [address] [place] [address] or Organizations: Not on file    Attends Club or Organization Meetings: Not on file    Marital Status: Not on file   Intimate Partner Violence:     Fear of Current or Ex-Partner: Not on file    Emotionally Abused: Not on file    Physically Abused: Not on file    Sexually Abused: Not on file   Housing Stability:     Unable to Pay for Housing in the Last Year: Not on file    Number of [place] in the Last Year: Not on file    Unstable Housing in the Last Year: Not on file     No current facility-administered medications for this encounter. Current Outpatient Medications   Medication Sig Dispense Refill    apixaban (ELIQUIS) 5 MG TABS tablet Take 1 tablet by mouth 2 times daily 60 tablet 0    pantoprazole (PROTONIX) 40 MG tablet Take 1 tablet by mouth every morning (before breakfast) 30 tablet 0    folic acid (FOLVITE) 1 MG tablet Take 2 tablets by mouth daily 30 tablet 3    oxyCODONE HCl (OXY-IR) 10 MG immediate release tablet Take 10 mg by mouth every 4 hours as needed for Pain.       albuterol sulfate HFA (PROAIR HFA) 108 (90 Base) MCG/ACT inhaler Albuterol HFA Inhaler 90 mcg/actuation  inhaled  2 puffs prn     Active      levalbuterol (XOPENEX) 0.31 MG/3ML nebulization Levalbuterol Nebulized    2 puffs prn     Active      docusate (COLACE, DULCOLAX) 100 MG CAPS Take 100 mg by mouth      ibuprofen (ADVIL;MOTRIN) 800 MG tablet ibuprofen 800 MG Oral Tablet  oral   prn    Active      hydroxyurea (HYDREA) 500 MG chemo capsule Take 1,000 mg by mouth daily        Allergies   Allergen Reactions    Morphine Shortness Of Breath     Other reaction(s): Histamine-like Reaction  Has asthma exacerbation with morphine-histamine type reaction         REVIEW OF SYSTEMS  10 systems reviewed, pertinent positives per HPI otherwise noted to be negative. PHYSICAL EXAM  BP (!) 111/54   Pulse 82   Temp 98.2 °F (36.8 °C)   Resp 16   Ht 5' 8\" (1.727 m)   Wt 185 lb (83.9 kg)   SpO2 95%   BMI 28.13 kg/m²   GENERAL APPEARANCE: Awake and alert. Cooperative. No acute distress. HEAD: Normocephalic. Atraumatic. EYES: PERRL. EOM's grossly intact  ENT: Mucous membranes are moist.   NECK: Supple, trachea midline. HEART: RRR. Normal S1, S2. No murmurs, rubs or gallops. LUNGS: Respirations unlabored. CTAB. Good air exchange. No wheezes, rales, or rhonchi. Speaking comfortably in full sentences. ABDOMEN: Soft. Non-distended. Non-tender. No guarding or rebound. Normal Bowel sounds. Musculoskeletal: Tenderness over right lateral trochanter. No gross swelling or evidence of deformity. Full range of motion without difficulty. Distal pulses and sensation intact. EXTREMITIES: No peripheral edema. MAEE. No acute deformities. SKIN: Warm and dry. No acute rashes. NEUROLOGICAL: Alert and oriented X 3. CN II-XII intact. No gross facial drooping. Strength 5/5, sensation intact. No pronator drift. Normal coordination. Gait normal.   PSYCHIATRIC: Normal mood and affect. LABS  I have reviewed all labs for this visit.    Results for orders placed or performed during the hospital encounter of 04/26/22   CBC with Auto Differential   Result Value Ref Range    WBC 19.6 (H) 4.0 - 11.0 K/uL    RBC 2.50 (L) 4.20 - 5.90 M/uL    Hemoglobin 8.6 (L) 13.5 - 17.5 g/dL    Hematocrit 24.5 (L) 40.5 - 52.5 %    MCV 97.8 80.0 - 100.0 fL    MCH 34.4 (H) 26.0 - 34.0 pg    MCHC 35.1 31.0 - 36.0 g/dL    RDW 23.3 (H) 12.4 - 15.4 %    Platelets 437 798 - 300 K/uL    MPV 8.2 5.0 - 10.5 fL    Neutrophils % 74.0 %    Lymphocytes % 8.0 %    Monocytes % 11.0 %    Eosinophils % 0.0 %    Basophils % 0.0 %    Neutrophils Absolute 15.5 (H) 1.7 - 7.7 K/uL    Lymphocytes Absolute 2.0 1.0 - 5.1 K/uL    Monocytes Absolute 2.2 (H) 0.0 - 1.3 K/uL    Eosinophils Absolute 0.0 0.0 - 0.6 K/uL    Basophils Absolute 0.0 0.0 - 0.2 K/uL    Bands Relative 5 0 - 7 %    Atypical Lymphocytes Relative 2 0 - 6 %    nRBC 3 (A) /100 WBC    Anisocytosis 1+ (A)     Macrocytes 1+ (A)     Microcytes Occasional (A)     Polychromasia 1+ (A)     Poikilocytes 2+ (A)     Schistocytes Occasional (A)     Ovalocytes 1+ (A)     Target Cells 1+ (A)     Sickle Cells 2+ (A)    Comprehensive Metabolic Panel   Result Value Ref Range    Sodium 140 136 - 145 mmol/L    Potassium 4.2 3.5 - 5.1 mmol/L    Chloride 104 99 - 110 mmol/L    CO2 25 21 - 32 mmol/L    Anion Gap 11 3 - 16    Glucose 101 (H) 70 - 99 mg/dL    BUN 6 (L) 7 - 20 mg/dL    CREATININE 0.6 (L) 0.9 - 1.3 mg/dL    GFR Non-African American >60 >60    GFR African American >60 >60    Calcium 10.5 8.3 - 10.6 mg/dL    Total Protein 7.3 6.4 - 8.2 g/dL    Albumin 4.7 3.4 - 5.0 g/dL    Albumin/Globulin Ratio 1.8 1.1 - 2.2    Total Bilirubin 6.8 (H) 0.0 - 1.0 mg/dL    Alkaline Phosphatase 94 40 - 129 U/L    ALT 12 10 - 40 U/L    AST 30 15 - 37 U/L   Reticulocytes   Result Value Ref Range    Retic Ct Pct 13.31 (H) 0.50 - 2.18 %    Retic Ct Abs 0.333 M/uL    Immature Retic Fract 0.67 (H) 0.21 - 0.37       RADIOLOGY  X-RAYS:  I have reviewed radiologic plain film image(s). ALL OTHER NON-PLAIN FILM IMAGES SUCH AS CT, ULTRASOUND AND MRI HAVE BEEN READ BY THE RADIOLOGIST. XR HIP RIGHT (2-3 VIEWS)   Final Result   No acute osseous abnormality. Rechecks: Physical assessment performed. 1013: Pain noted to be 9/10, however, patient is resting comfortably using his phone. Consult hematology. Discussed patient with Dr. Dianne Amador. Agrees with assessment. Recommends limiting patient's pain medications in the future to a single dose of Toradol only. Patient may contact office for refills. ED COURSE/MDM  Patient seen and evaluated. Old records reviewed. Labs and imaging reviewed and results discussed with patient. Patient was given a single dose of oxycodone as well as Toradol. Patient was reassessed as noted above . No acute pathology was noted and pt is safe for discharge home to follow up with hematology. Patient's labs are stable. . Plan of care discussed with patient and family. Patient and family in agreement with plan. Patient was given scripts for the following medications. I counseled patient how to take these medications. Discharge Medication List as of 4/26/2022 11:19 AM          CLINICAL IMPRESSION  1. Right hip pain    2. Hx of sickle cell disease        Blood pressure (!) 111/54, pulse 82, temperature 98.2 °F (36.8 °C), resp. rate 16, height 5' 8\" (1.727 m), weight 185 lb (83.9 kg), SpO2 95 %. 12 [facility] was discharged in stable condition.         Abdi Zamora DO  04/26/22 2104

## 2022-04-26 NOTE — DISCHARGE SUMMARY
ONCOLOGY HEMATOLOGY CARE PROGRESS NOTE      SUBJECTIVE:     The patient states that his facial pain is much better. He feels that he can safely go home today. His pain is well controlled. His facial swelling is markedly diminished. ROS:     Constitutional:  No weight loss, No fever, No chills, No night sweats. Energy level good. Eyes:  No impairment or change in vision  ENT / Mouth: He states his pain is much better in the left facial and jaw area. Cardiovascular:  No chest pain, palpitations, new edema, or calf discomfort  Respiratory:  No pain, hemoptysis, change to breathing  Breast:  No pain, discharge, change in appearance or texture  Gastrointestinal:  No pain, cramping, jaundice, change to eating and bowel habits  Urinary:  No pain, bleeding or change in continence  Genitalia: No pain, bleeding or discharge  Musculoskeletal:  No redness, pain, edema or weakness  Skin:  No pruritus, rash, change to nodules or lesions  Neurologic:  No discomfort, change in mental status, speech, sensory or motor activity  Psychiatric:  No change in concentration or change to affect or mood  Endocrine:  No hot flashes, increased thirst, or change to urine production  Hematologic: No petechiae, ecchymosis or bleeding  Lymphatic:  No lymphadenopathy or lymphedema  Allergy / Immunologic:  No eczema, hives, frequent or recurrent infections    OBJECTIVE        Physical    VITALS:  /75   Pulse 76   Temp 98.1 °F (36.7 °C) (Oral)   Resp 18   Ht 5' 8\" (1.727 m)   Wt 192 lb 0.3 oz (87.1 kg)   SpO2 95%   BMI 29.20 kg/m²   TEMPERATURE:  Current - Temp: 98.1 °F (36.7 °C);  Max - No data recorded  PULSE OXIMETRY RANGE: No data recorded  24HR INTAKE/OUTPUT:    No intake or output data in the 24 hours ending 04/25/22 2003    CONSTITUTIONAL: awake, alert, cooperative, no apparent distress   EYES: pupils equal, round and reactive to light, sclera clear and conjunctiva normal  ENT: The left facial swelling is greatly improved. NECK: supple, symmetrical, no jugular venous distension and no carotid bruits   HEMATOLOGIC/LYMPHATIC: no cervical, supraclavicular or axillary lymphadenopathy   LUNGS: no increased work of breathing and clear to auscultation   CARDIOVASCULAR: regular rate and rhythm, normal S1 and S2, no murmur noted  ABDOMEN: normal bowel sounds x 4, soft, non-distended, non-tender, no masses palpated, no hepatosplenomgaly   MUSCULOSKELETAL: full range of motion noted, tone is normal  NEUROLOGIC: awake, alert, oriented to name, place and time. Motor skills grossly intact. SKIN: Normal skin color, texture, turgor and no jaundice. appears intact   EXTREMITIES: no LE edema       Data      No results for input(s): WBC, HGB, HCT, PLT, MCV in the last 72 hours. No results for input(s): NA, K, CL, CO2, PHOS, BUN, CREATININE, CA in the last 72 hours. No results for input(s): AST, ALT, ALB, BILIDIR, BILITOT, ALKPHOS in the last 72 hours. Magnesium:    Lab Results   Component Value Date    MG 2.10 01/16/2022    MG 2.00 09/13/2021    MG 1.80 05/05/2021         Problem List  Patient Active Problem List   Diagnosis    Sickle cell pain crisis ([address]    Asthma    Sickle cell anemia (HCC)    Sepsis ([address]    [facility]    Opiate antagonist poisoning, accidental or unintentional, initial encounter    Leukocytosis    Hypoxia    Anemia    Other chest pain    Pneumonia due to infectious organism    Fever    Chronic prescription opiate use    Right leg pain    Dental infection    Sickle cell crisis ([address]    History of DVT in adulthood    Overweight (BMI 25.0-29. 9)       ASSESSMENT AND PLAN:    Sickle cell anemia:  -The patient is been on Crizanilizumab, But this was felt to be ineffective and discontinued in September 2021  -He is currently on Hydrea 1000 mg daily  -Unfortunately, he has a very low pain tolerance and comes to the emergency room frequently  -We have to fill his pain medications weekly or he is not responsible with them.  -He does not complain of having a crisis this morning, but just jaw pain  -He is status post transfusion  -His hemoglobin is 7.6  -This is satisfactory        Priapism:  -Earlier this week  -Resolved per the patient     Jaw pain:  -He is currently on clindamycin as prescribed in the emergency department  -Oral surgeon was consulted, but it may seem there is no one here on staff.  -If he progresses, he will need to be transferred.   Fortunately, he is actually improving.  -His facial swelling continues to improve.  -He will likely need to see oral surgery as an outpatient    Pain:  -Would continue his oxycodone as needed at this time  -Oxycodone was electronically prescribed so he may pick this up at discharge        ONCOLOGIC DISPOSITION:  -The patient was discharged in good condition  -He was greatly improved  -He will follow-up in the office next week    Janene Green MD  Please contact through [address]

## 2022-04-28 ENCOUNTER — APPOINTMENT (OUTPATIENT)
Dept: GENERAL RADIOLOGY | Age: 22
End: 2022-04-28
Payer: COMMERCIAL

## 2022-04-28 ENCOUNTER — HOSPITAL ENCOUNTER (EMERGENCY)
Age: 22
Discharge: HOME | End: 2022-04-28
Attending: EMERGENCY MEDICINE
Payer: COMMERCIAL

## 2022-04-28 ENCOUNTER — APPOINTMENT (OUTPATIENT)
Dept: CT IMAGING | Age: 22
End: 2022-04-28
Payer: COMMERCIAL

## 2022-04-28 VITALS
WEIGHT: 185 LBS | OXYGEN SATURATION: 98 % | TEMPERATURE: 97.9 F | HEART RATE: 68 BPM | DIASTOLIC BLOOD PRESSURE: 66 MMHG | BODY MASS INDEX: 28.13 KG/M2 | SYSTOLIC BLOOD PRESSURE: 112 MMHG | RESPIRATION RATE: 21 BRPM

## 2022-04-28 DIAGNOSIS — G89.4 CHRONIC PAIN SYNDROME: Primary | ICD-10-CM

## 2022-04-28 DIAGNOSIS — R07.9 CHEST PAIN, UNSPECIFIED TYPE: ICD-10-CM

## 2022-04-28 DIAGNOSIS — Z86.2 HX OF SICKLE CELL ANEMIA: ICD-10-CM

## 2022-04-28 LAB
A/G RATIO: 1.8 (ref 1.1–2.2)
ALBUMIN SERPL-MCNC: 4.4 G/DL (ref 3.4–5)
ALP BLD-CCNC: 87 U/L (ref 40–129)
ALT SERPL-CCNC: 12 U/L (ref 10–40)
ANION GAP SERPL CALCULATED.3IONS-SCNC: 12 MMOL/L (ref 3–16)
AST SERPL-CCNC: 30 U/L (ref 15–37)
BASOPHILS ABSOLUTE: 0.2 K/UL (ref 0–0.2)
BASOPHILS RELATIVE PERCENT: 1 %
BILIRUB SERPL-MCNC: 4.5 MG/DL (ref 0–1)
BUN BLDV-MCNC: 7 MG/DL (ref 7–20)
CALCIUM SERPL-MCNC: 10.1 MG/DL (ref 8.3–10.6)
CHLORIDE BLD-SCNC: 104 MMOL/L (ref 99–110)
CO2: 25 MMOL/L (ref 21–32)
CREAT SERPL-MCNC: 0.8 MG/DL (ref 0.9–1.3)
EKG ATRIAL RATE: 72 BPM
EKG DIAGNOSIS: NORMAL
EKG P AXIS: 15 DEGREES
EKG P-R INTERVAL: 158 MS
EKG Q-T INTERVAL: 402 MS
EKG QRS DURATION: 98 MS
EKG QTC CALCULATION (BAZETT): 440 MS
EKG R AXIS: 66 DEGREES
EKG T AXIS: 26 DEGREES
EKG VENTRICULAR RATE: 72 BPM
EOSINOPHILS ABSOLUTE: 0.2 K/UL (ref 0–0.6)
EOSINOPHILS RELATIVE PERCENT: 1 %
GFR AFRICAN AMERICAN: >60
GFR NON-AFRICAN AMERICAN: >60
GLUCOSE BLD-MCNC: 108 MG/DL (ref 70–99)
HCT VFR BLD CALC: 20.2 % (ref 40.5–52.5)
HEMOGLOBIN: 7.1 G/DL (ref 13.5–17.5)
IMMATURE RETIC FRACT: 0.66 (ref 0.21–0.37)
LYMPHOCYTES ABSOLUTE: 3.2 K/UL (ref 1–5.1)
LYMPHOCYTES RELATIVE PERCENT: 20 %
MCH RBC QN AUTO: 33.8 PG (ref 26–34)
MCHC RBC AUTO-ENTMCNC: 35.2 G/DL (ref 31–36)
MCV RBC AUTO: 96.1 FL (ref 80–100)
MONOCYTES ABSOLUTE: 0.6 K/UL (ref 0–1.3)
MONOCYTES RELATIVE PERCENT: 4 %
NEUTROPHILS ABSOLUTE: 11.9 K/UL (ref 1.7–7.7)
NEUTROPHILS RELATIVE PERCENT: 74 %
NUCLEATED RED BLOOD CELLS: 3 /100 WBC
OVALOCYTES: ABNORMAL
PDW BLD-RTO: 21.1 % (ref 12.4–15.4)
PLATELET # BLD: 332 K/UL (ref 135–450)
PMV BLD AUTO: 8.4 FL (ref 5–10.5)
POIKILOCYTES: ABNORMAL
POLYCHROMASIA: ABNORMAL
POTASSIUM SERPL-SCNC: 3.9 MMOL/L (ref 3.5–5.1)
RBC # BLD: 2.1 M/UL (ref 4.2–5.9)
RETICULOCYTE ABSOLUTE COUNT: 0.24 M/UL
RETICULOCYTE COUNT PCT: 11.68 % (ref 0.5–2.18)
SICKLE CELLS: ABNORMAL
SLIDE REVIEW: ABNORMAL
SODIUM BLD-SCNC: 141 MMOL/L (ref 136–145)
TARGET CELLS: ABNORMAL
TOTAL PROTEIN: 6.9 G/DL (ref 6.4–8.2)
TROPONIN: <0.01 NG/ML
TROPONIN: <0.01 NG/ML
WBC # BLD: 16.1 K/UL (ref 4–11)

## 2022-04-28 PROCEDURE — 85045 AUTOMATED RETICULOCYTE COUNT: CPT

## 2022-04-28 PROCEDURE — 84484 ASSAY OF TROPONIN QUANT: CPT

## 2022-04-28 PROCEDURE — 96374 THER/PROPH/DIAG INJ IV PUSH: CPT

## 2022-04-28 PROCEDURE — 93010 ELECTROCARDIOGRAM REPORT: CPT | Performed by: INTERNAL MEDICINE

## 2022-04-28 PROCEDURE — 99285 EMERGENCY DEPT VISIT HI MDM: CPT

## 2022-04-28 PROCEDURE — 6360000002 HC RX W HCPCS: Performed by: EMERGENCY MEDICINE

## 2022-04-28 PROCEDURE — 71260 CT THORAX DX C+: CPT

## 2022-04-28 PROCEDURE — 96361 HYDRATE IV INFUSION ADD-ON: CPT

## 2022-04-28 PROCEDURE — 71045 X-RAY EXAM CHEST 1 VIEW: CPT

## 2022-04-28 PROCEDURE — 6360000004 HC RX CONTRAST MEDICATION: Performed by: EMERGENCY MEDICINE

## 2022-04-28 PROCEDURE — 2580000003 HC RX 258: Performed by: EMERGENCY MEDICINE

## 2022-04-28 PROCEDURE — 85025 COMPLETE CBC W/AUTO DIFF WBC: CPT

## 2022-04-28 PROCEDURE — 93005 ELECTROCARDIOGRAM TRACING: CPT | Performed by: EMERGENCY MEDICINE

## 2022-04-28 PROCEDURE — 80053 COMPREHEN METABOLIC PANEL: CPT

## 2022-04-28 PROCEDURE — 6370000000 HC RX 637 (ALT 250 FOR IP): Performed by: EMERGENCY MEDICINE

## 2022-04-28 RX ORDER — ACETAMINOPHEN 325 MG/1
650 TABLET ORAL ONCE
Status: COMPLETED | OUTPATIENT
Start: 2022-04-28 | End: 2022-04-28

## 2022-04-28 RX ORDER — KETOROLAC TROMETHAMINE 30 MG/ML
30 INJECTION, SOLUTION INTRAMUSCULAR; INTRAVENOUS ONCE
Status: COMPLETED | OUTPATIENT
Start: 2022-04-28 | End: 2022-04-28

## 2022-04-28 RX ORDER — 0.9 % SODIUM CHLORIDE 0.9 %
1000 INTRAVENOUS SOLUTION INTRAVENOUS ONCE
Status: COMPLETED | OUTPATIENT
Start: 2022-04-28 | End: 2022-04-28

## 2022-04-28 RX ADMIN — ACETAMINOPHEN 650 MG: 325 TABLET ORAL at 16:22

## 2022-04-28 RX ADMIN — KETOROLAC TROMETHAMINE 30 MG: 30 INJECTION, SOLUTION INTRAMUSCULAR at 12:47

## 2022-04-28 RX ADMIN — SODIUM CHLORIDE 1000 ML: 9 INJECTION, SOLUTION INTRAVENOUS at 12:45

## 2022-04-28 RX ADMIN — IOPAMIDOL 75 ML: 755 INJECTION, SOLUTION INTRAVENOUS at 13:49

## 2022-04-28 ASSESSMENT — PAIN DESCRIPTION - DESCRIPTORS
DESCRIPTORS: SHARP
DESCRIPTORS_2: SHARP
DESCRIPTORS: SHARP
DESCRIPTORS_2: SHARP

## 2022-04-28 ASSESSMENT — PAIN DESCRIPTION - PAIN TYPE
TYPE: ACUTE PAIN
TYPE: ACUTE PAIN

## 2022-04-28 ASSESSMENT — PAIN - FUNCTIONAL ASSESSMENT: PAIN_FUNCTIONAL_ASSESSMENT: 0-10

## 2022-04-28 ASSESSMENT — LIFESTYLE VARIABLES: HOW OFTEN DO YOU HAVE A DRINK CONTAINING ALCOHOL: NEVER

## 2022-04-28 ASSESSMENT — PAIN DESCRIPTION - FREQUENCY
FREQUENCY: CONTINUOUS
FREQUENCY: CONTINUOUS

## 2022-04-28 ASSESSMENT — HEART SCORE: ECG: 0

## 2022-04-28 ASSESSMENT — PAIN DESCRIPTION - ORIENTATION
ORIENTATION: RIGHT
ORIENTATION_2: RIGHT
ORIENTATION_2: RIGHT

## 2022-04-28 ASSESSMENT — PAIN DESCRIPTION - LOCATION
LOCATION: CHEST
LOCATION_2: HIP
LOCATION: CHEST;HIP
LOCATION_2: HIP

## 2022-04-28 ASSESSMENT — PAIN SCALES - GENERAL
PAINLEVEL_OUTOF10: 9

## 2022-04-28 ASSESSMENT — PAIN DESCRIPTION - INTENSITY
RATING_2: 9
RATING_2: 9

## 2022-04-28 NOTE — PROGRESS NOTES
Ct scan was completed,upon return to pt, he c/o pain in right ac where iv is. There was swelling above the iv. Iv still flushed and gives good blood return. I left iv in in case RN wanted to keep access. Pt sts pain around iv but contrast bolus was good for scan. Any infiltrate would be from saline injection at end of scan. ..30-40 ml infiltrate.

## 2022-04-28 NOTE — ED PROVIDER NOTES
CHIEF COMPLAINT  Sickle Cell Pain Crisis (was picked up at work at [employer] for sickle cell pain, having pain in hip, back and chest)      HISTORY OF PRESENT ILLNESS  Radha Gaxiola is a 24 y.o. male with a history of asthma, sickle cell anemia, and DVT who presents to the ED complaining of \"sickle cell pain crisis. \"Patient presents to the emergency department complaining of right hip pain as well as chest and back pain. Pain is rated as moderate to severe. No alleviating or exacerbating factors. Patient denies fevers, chills, sweats. No cough, congestion. No hemoptysis. No lower extremity swelling or edema. Patient notes that he did have a brief instance of priapism early this morning which resolved with ice. Patient reports 9/10 pain at this time. .   No other complaints, modifying factors or associated symptoms. I have reviewed the following from the nursing documentation.     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)     Sickle cell anemia (HCC)      Past Surgical History:   Procedure Laterality Date    SPLENECTOMY       Family History   Problem Relation Age of Onset    Other Father         sarcoidosis     Social History     Socioeconomic History    Marital status: Single     Spouse name: Not on file    Number of children: 0    Years of education: Not on file    Highest education level: Not on file   Occupational History    Not on file   Tobacco Use    Smoking status: Never Smoker    Smokeless tobacco: Never Used   Vaping Use    Vaping Use: Never used   Substance and Sexual Activity    Alcohol use: Not Currently    Drug use: Never    Sexual activity: Not Currently   Other Topics Concern    Not on file   Social History Narrative    Not on file     Social Determinants of Health     Financial Resource Strain:     Difficulty of Paying Living Expenses: Not on file   Food Insecurity:     Worried About Running Out of Food in the Last Year: Not on file    Ran Out of Food in the Last Year: Not on file   Transportation Needs:     Lack of Transportation (Medical): Not on file    Lack of Transportation (Non-Medical): Not on file   Physical Activity:     Days of Exercise per Week: Not on file    Minutes of Exercise per Session: Not on file   Stress:     Feeling of Stress : Not on file   Social Connections:     Frequency of Communication with Friends and Family: Not on file    Frequency of Social Gatherings with Friends and Family: Not on file    Attends Yazidi Services: Not on file    Active Member of [address] [place] [address] [employer] or Organizations: Not on file    Attends Club or Organization Meetings: Not on file    Marital Status: Not on file   Intimate Partner Violence:     Fear of Current or Ex-Partner: Not on file    Emotionally Abused: Not on file    Physically Abused: Not on file    Sexually Abused: Not on file   Housing Stability:     Unable to Pay for Housing in the Last Year: Not on file    Number of [place] in the Last Year: Not on file    Unstable Housing in the Last Year: Not on file     Current Facility-Administered Medications   Medication Dose Route Frequency Provider Last Rate Last Admin    acetaminophen (TYLENOL) tablet 650 mg  650 mg Oral Once Nereida Sandhoff, DO         Current Outpatient Medications   Medication Sig Dispense Refill    apixaban (ELIQUIS) 5 MG TABS tablet Take 1 tablet by mouth 2 times daily 60 tablet 0    pantoprazole (PROTONIX) 40 MG tablet Take 1 tablet by mouth every morning (before breakfast) 30 tablet 0    folic acid (FOLVITE) 1 MG tablet Take 2 tablets by mouth daily 30 tablet 3    oxyCODONE HCl (OXY-IR) 10 MG immediate release tablet Take 10 mg by mouth every 4 hours as needed for Pain.       albuterol sulfate HFA (PROAIR HFA) 108 (90 Base) MCG/ACT inhaler Albuterol HFA Inhaler 90 mcg/actuation inhaled  2 puffs prn     Active      levalbuterol (XOPENEX) 0.31 MG/3ML nebulization Levalbuterol Nebulized    2 puffs prn     Active      docusate (COLACE, DULCOLAX) 100 MG CAPS Take 100 mg by mouth      ibuprofen (ADVIL;MOTRIN) 800 MG tablet ibuprofen 800 MG Oral Tablet  oral   prn    Active      hydroxyurea (HYDREA) 500 MG chemo capsule Take 1,000 mg by mouth daily        Allergies   Allergen Reactions    Morphine Shortness Of Breath     Other reaction(s): Histamine-like Reaction  Has asthma exacerbation with morphine-histamine type reaction         REVIEW OF SYSTEMS  10 systems reviewed, pertinent positives per HPI otherwise noted to be negative. PHYSICAL EXAM  /62   Pulse 69   Temp 97.9 °F (36.6 °C) (Oral)   Resp 18   Wt 185 lb (83.9 kg)   SpO2 90%   BMI 28.13 kg/m²   GENERAL APPEARANCE: Awake and alert. Cooperative. No acute distress. HEAD: Normocephalic. Atraumatic. EYES: PERRL. EOM's grossly intact. .  ENT: Mucous membranes are moist.   NECK: Supple, trachea midline. HEART: RRR. Normal S1, S2. No murmurs, rubs or gallops. LUNGS: Respirations unlabored. CTAB. Good air exchange. No wheezes, rales, or rhonchi. Speaking comfortably in full sentences. ABDOMEN: Soft. Non-distended. Non-tender. No guarding or rebound. Normal Bowel sounds. EXTREMITIES: Musculoskeletal: Right hip: Diffuse tenderness. No gross deformity, or overlying redness. Full range of motion. No peripheral edema. MAEE. No acute deformities. SKIN: Warm and dry. No acute rashes. NEUROLOGICAL: Alert and oriented X 3. CN II-XII intact. No gross facial drooping. Strength 5/5, sensation intact. No pronator drift. Normal coordination. Gait normal.   PSYCHIATRIC: Normal mood and affect. LABS  I have reviewed all labs for this visit.    Results for orders placed or performed during the hospital encounter of 04/28/22   CBC with Auto Differential   Result Value Ref Range    WBC 16.1 (H) 4.0 - 11.0 K/uL    RBC 2.10 (L) 4.20 - 5.90 M/uL    Hemoglobin 7.1 (L) 13.5 - 17.5 g/dL    Hematocrit 20.2 (LL) 40.5 - 52.5 %    MCV 96.1 80.0 - 100.0 fL    MCH 33.8 26.0 - 34.0 pg    MCHC 35.2 31.0 - 36.0 g/dL    RDW 21.1 (H) 12.4 - 15.4 %    Platelets 147 852 - 202 K/uL    MPV 8.4 5.0 - 10.5 fL    SLIDE REVIEW see below     Neutrophils % 74.0 %    Lymphocytes % 20.0 %    Monocytes % 4.0 %    Eosinophils % 1.0 %    Basophils % 1.0 %    Neutrophils Absolute 11.9 (H) 1.7 - 7.7 K/uL    Lymphocytes Absolute 3.2 1.0 - 5.1 K/uL    Monocytes Absolute 0.6 0.0 - 1.3 K/uL    Eosinophils Absolute 0.2 0.0 - 0.6 K/uL    Basophils Absolute 0.2 0.0 - 0.2 K/uL    nRBC 3 (A) /100 WBC    Polychromasia 1+ (A)     Poikilocytes 2+ (A)     Ovalocytes 1+ (A)     Target Cells 1+ (A)     Sickle Cells 2+ (A)    Comprehensive Metabolic Panel   Result Value Ref Range    Sodium 141 136 - 145 mmol/L    Potassium 3.9 3.5 - 5.1 mmol/L    Chloride 104 99 - 110 mmol/L    CO2 25 21 - 32 mmol/L    Anion Gap 12 3 - 16    Glucose 108 (H) 70 - 99 mg/dL    BUN 7 7 - 20 mg/dL    CREATININE 0.8 (L) 0.9 - 1.3 mg/dL    GFR Non-African American >60 >60    GFR African American >60 >60    Calcium 10.1 8.3 - 10.6 mg/dL    Total Protein 6.9 6.4 - 8.2 g/dL    Albumin 4.4 3.4 - 5.0 g/dL    Albumin/Globulin Ratio 1.8 1.1 - 2.2    Total Bilirubin 4.5 (H) 0.0 - 1.0 mg/dL    Alkaline Phosphatase 87 40 - 129 U/L    ALT 12 10 - 40 U/L    AST 30 15 - 37 U/L   Troponin   Result Value Ref Range    Troponin <0.01 <0.01 ng/mL   Reticulocytes   Result Value Ref Range    Retic Ct Pct 11.68 (H) 0.50 - 2.18 %    Retic Ct Abs 0.245 M/uL    Immature Retic Fract 0.66 (H) 0.21 - 0.37   Troponin   Result Value Ref Range    Troponin <0.01 <0.01 ng/mL   EKG 12 Lead   Result Value Ref Range    Ventricular Rate 72 BPM    Atrial Rate 72 BPM    P-R Interval 158 ms    QRS Duration 98 ms    Q-T Interval 402 ms    QTc Calculation (Bazett) 440 ms    P Axis 15 degrees    R Axis 66 degrees    T Axis 26 degrees    Diagnosis Normal sinus rhythmMinimal voltage criteria for LVH, may be normal variantNonspecific T wave abnormalityAbnormal ECGWhen compared with ECG of 16-JAN-2022 12:48,Nonspecific T wave abnormality has replaced inverted T waves in Inferior leadsConfirmed by Marija Callaway MD, ANTONIETTA ([id number]) on 4/28/2022 4:01:47 PM       EKG  The Ekg interpreted by myself  Normal sinus rhythm with a rate of 72. Normal axis. Normal intervals and durations. Nonspecific T wave changes noted. No acute signs of ischemia. Nonspecific T wave changes are similar to previous EKG on 1/16/2022. RADIOLOGY  X-RAYS:  I have reviewed radiologic plain film image(s). ALL OTHER NON-PLAIN FILM IMAGES SUCH AS CT, ULTRASOUND AND MRI HAVE BEEN READ BY THE RADIOLOGIST. CT CHEST PULMONARY EMBOLISM W CONTRAST   Preliminary Result   No evidence of pulmonary embolism or acute pulmonary abnormality. XR CHEST PORTABLE   Final Result   No acute process. Rechecks: Physical assessment performed. 1150: Blood pressure 107/62. Patient resting comfortably and spite of complaints of ongoing pain. ED COURSE/MDM  Patient seen and evaluated. Old records reviewed. Labs and imaging reviewed and results discussed with patient. Patient was given Toradol in the ED with good symptomatic relief. Patient was reassessed as noted above . No acute pathology was noted and pt is safe for discharge home to follow up with PCP. Patient's labs are stable. EKG unchanged from previous. Chest x-ray and CT of the chest appear stable and without evidence of acute chest syndrome or complication from sickle cell disease. Per my previous conversation with Dr. Lola Banegas during patient's last visit, patient was provided saline as well as 30 mg of Toradol IV. Patient requested additional pain medication and was provided Tylenol. . Plan of care discussed with patient and family. Patient and family in agreement with plan.      Patient was given scripts for the following medications. I counseled patient how to take these medications. Discharge Medication List as of 4/28/2022  4:28 PM          CLINICAL IMPRESSION  1. Chronic pain syndrome    2. Chest pain, unspecified type    3. Hx of sickle cell anemia        Blood pressure 112/66, pulse 68, temperature 97.9 °F (36.6 °C), temperature source Oral, resp. rate 21, weight 185 lb (83.9 kg), SpO2 98 %. 12 [facility] was discharged to home in stable condition.         Nathalia Richardson,   04/28/22 [place],   04/28/22 [id number]

## 2022-04-28 NOTE — ED NOTES
Mr Giancarlo Varghese was seen in the emergency department for an episode of agitation  In the emergency department he was calm and cooperative  Return if his symptoms worsen, if he develops any sign of wanting to hurt himself or others  Pt returned from CT via stretcher. IVFs held due to swelling at IV site after scan completed. Saline Lock Aspirates blood without difficulty and flushes easily. IVFs held at this time. Awaiting CT results.   Dr Miguel Ángel Durán updated     Tucker Marsh RN  04/28/22 [id number]

## 2022-05-01 ENCOUNTER — APPOINTMENT (OUTPATIENT)
Dept: GENERAL RADIOLOGY | Age: 22
DRG: 812 | End: 2022-05-01
Payer: COMMERCIAL

## 2022-05-01 ENCOUNTER — HOSPITAL ENCOUNTER (INPATIENT)
Age: 22
LOS: 1 days | Discharge: HOME | DRG: 812 | End: 2022-05-03
Attending: STUDENT IN AN ORGANIZED HEALTH CARE EDUCATION/TRAINING PROGRAM | Admitting: INTERNAL MEDICINE
Payer: COMMERCIAL

## 2022-05-01 DIAGNOSIS — D57.00 SICKLE CELL PAIN CRISIS (HCC): Primary | ICD-10-CM

## 2022-05-01 LAB
ANION GAP SERPL CALCULATED.3IONS-SCNC: 12 MMOL/L (ref 3–16)
BASOPHILS ABSOLUTE: 0.1 K/UL (ref 0–0.2)
BASOPHILS RELATIVE PERCENT: 0.6 %
BUN BLDV-MCNC: 7 MG/DL (ref 7–20)
CALCIUM SERPL-MCNC: 10.8 MG/DL (ref 8.3–10.6)
CHLORIDE BLD-SCNC: 102 MMOL/L (ref 99–110)
CO2: 25 MMOL/L (ref 21–32)
CREAT SERPL-MCNC: 0.7 MG/DL (ref 0.9–1.3)
EOSINOPHILS ABSOLUTE: 0.1 K/UL (ref 0–0.6)
EOSINOPHILS RELATIVE PERCENT: 0.7 %
GFR AFRICAN AMERICAN: >60
GFR NON-AFRICAN AMERICAN: >60
GLUCOSE BLD-MCNC: 87 MG/DL (ref 70–99)
HCT VFR BLD CALC: 21.3 % (ref 40.5–52.5)
HEMATOLOGY PATH CONSULT: NO
HEMOGLOBIN: 7.4 G/DL (ref 13.5–17.5)
IMMATURE RETIC FRACT: 0.65 (ref 0.21–0.37)
LYMPHOCYTES ABSOLUTE: 2.2 K/UL (ref 1–5.1)
LYMPHOCYTES RELATIVE PERCENT: 12.8 %
MCH RBC QN AUTO: 33.5 PG (ref 26–34)
MCHC RBC AUTO-ENTMCNC: 34.8 G/DL (ref 31–36)
MCV RBC AUTO: 96.3 FL (ref 80–100)
MONOCYTES ABSOLUTE: 2.1 K/UL (ref 0–1.3)
MONOCYTES RELATIVE PERCENT: 12 %
NEUTROPHILS ABSOLUTE: 12.8 K/UL (ref 1.7–7.7)
NEUTROPHILS RELATIVE PERCENT: 73.9 %
PDW BLD-RTO: 22.3 % (ref 12.4–15.4)
PLATELET # BLD: 448 K/UL (ref 135–450)
PMV BLD AUTO: 8.8 FL (ref 5–10.5)
POTASSIUM REFLEX MAGNESIUM: 4.7 MMOL/L (ref 3.5–5.1)
RBC # BLD: 2.21 M/UL (ref 4.2–5.9)
RETICULOCYTE ABSOLUTE COUNT: 0.31 M/UL
RETICULOCYTE COUNT PCT: 13.88 % (ref 0.5–2.18)
SODIUM BLD-SCNC: 139 MMOL/L (ref 136–145)
TROPONIN: <0.01 NG/ML
WBC # BLD: 17.3 K/UL (ref 4–11)

## 2022-05-01 PROCEDURE — 80048 BASIC METABOLIC PNL TOTAL CA: CPT

## 2022-05-01 PROCEDURE — 85045 AUTOMATED RETICULOCYTE COUNT: CPT

## 2022-05-01 PROCEDURE — 85379 FIBRIN DEGRADATION QUANT: CPT

## 2022-05-01 PROCEDURE — 71046 X-RAY EXAM CHEST 2 VIEWS: CPT

## 2022-05-01 PROCEDURE — 96376 TX/PRO/DX INJ SAME DRUG ADON: CPT

## 2022-05-01 PROCEDURE — 99285 EMERGENCY DEPT VISIT HI MDM: CPT

## 2022-05-01 PROCEDURE — 85025 COMPLETE CBC W/AUTO DIFF WBC: CPT

## 2022-05-01 PROCEDURE — 93005 ELECTROCARDIOGRAM TRACING: CPT | Performed by: STUDENT IN AN ORGANIZED HEALTH CARE EDUCATION/TRAINING PROGRAM

## 2022-05-01 PROCEDURE — 6360000002 HC RX W HCPCS: Performed by: STUDENT IN AN ORGANIZED HEALTH CARE EDUCATION/TRAINING PROGRAM

## 2022-05-01 PROCEDURE — 96374 THER/PROPH/DIAG INJ IV PUSH: CPT

## 2022-05-01 PROCEDURE — 80076 HEPATIC FUNCTION PANEL: CPT

## 2022-05-01 PROCEDURE — 84484 ASSAY OF TROPONIN QUANT: CPT

## 2022-05-01 PROCEDURE — 96375 TX/PRO/DX INJ NEW DRUG ADDON: CPT

## 2022-05-01 PROCEDURE — 6370000000 HC RX 637 (ALT 250 FOR IP): Performed by: STUDENT IN AN ORGANIZED HEALTH CARE EDUCATION/TRAINING PROGRAM

## 2022-05-01 RX ORDER — HYDROMORPHONE HCL 110MG/55ML
2 PATIENT CONTROLLED ANALGESIA SYRINGE INTRAVENOUS ONCE
Status: COMPLETED | OUTPATIENT
Start: 2022-05-01 | End: 2022-05-01

## 2022-05-01 RX ORDER — ACETAMINOPHEN 500 MG
1000 TABLET ORAL ONCE
Status: COMPLETED | OUTPATIENT
Start: 2022-05-01 | End: 2022-05-01

## 2022-05-01 RX ORDER — KETOROLAC TROMETHAMINE 30 MG/ML
30 INJECTION, SOLUTION INTRAMUSCULAR; INTRAVENOUS ONCE
Status: COMPLETED | OUTPATIENT
Start: 2022-05-01 | End: 2022-05-01

## 2022-05-01 RX ORDER — DEXTROSE, SODIUM CHLORIDE, AND POTASSIUM CHLORIDE 5; .45; .3 G/100ML; G/100ML; G/100ML
1000 INJECTION INTRAVENOUS CONTINUOUS
Status: DISCONTINUED | OUTPATIENT
Start: 2022-05-01 | End: 2022-05-03

## 2022-05-01 RX ORDER — OXYCODONE HYDROCHLORIDE 5 MG/1
10 TABLET ORAL ONCE
Status: COMPLETED | OUTPATIENT
Start: 2022-05-01 | End: 2022-05-01

## 2022-05-01 RX ADMIN — KETOROLAC TROMETHAMINE 30 MG: 30 INJECTION, SOLUTION INTRAMUSCULAR at 22:23

## 2022-05-01 RX ADMIN — HYDROMORPHONE HYDROCHLORIDE 2 MG: 2 INJECTION, SOLUTION INTRAMUSCULAR; INTRAVENOUS; SUBCUTANEOUS at 22:24

## 2022-05-01 RX ADMIN — OXYCODONE 10 MG: 5 TABLET ORAL at 23:57

## 2022-05-01 RX ADMIN — ACETAMINOPHEN 1000 MG: 500 TABLET ORAL at 22:26

## 2022-05-01 RX ADMIN — HYDROMORPHONE HYDROCHLORIDE 1 MG: 1 INJECTION, SOLUTION INTRAMUSCULAR; INTRAVENOUS; SUBCUTANEOUS at 23:58

## 2022-05-01 ASSESSMENT — PAIN SCALES - GENERAL
PAINLEVEL_OUTOF10: 8

## 2022-05-01 ASSESSMENT — PAIN DESCRIPTION - LOCATION: LOCATION: CHEST

## 2022-05-01 ASSESSMENT — PAIN - FUNCTIONAL ASSESSMENT: PAIN_FUNCTIONAL_ASSESSMENT: 0-10

## 2022-05-02 ENCOUNTER — APPOINTMENT (OUTPATIENT)
Dept: CT IMAGING | Age: 22
DRG: 812 | End: 2022-05-02
Payer: COMMERCIAL

## 2022-05-02 LAB
A/G RATIO: 1.4 (ref 1.1–2.2)
ABO/RH: NORMAL
ALBUMIN SERPL-MCNC: 4.2 G/DL (ref 3.4–5)
ALBUMIN SERPL-MCNC: 5 G/DL (ref 3.4–5)
ALP BLD-CCNC: 77 U/L (ref 40–129)
ALP BLD-CCNC: 90 U/L (ref 40–129)
ALT SERPL-CCNC: 10 U/L (ref 10–40)
ALT SERPL-CCNC: 9 U/L (ref 10–40)
ANION GAP SERPL CALCULATED.3IONS-SCNC: 10 MMOL/L (ref 3–16)
ANTIBODY SCREEN: NORMAL
AST SERPL-CCNC: 22 U/L (ref 15–37)
AST SERPL-CCNC: 27 U/L (ref 15–37)
BASOPHILS ABSOLUTE: 0.1 K/UL (ref 0–0.2)
BASOPHILS RELATIVE PERCENT: 1 %
BILIRUB SERPL-MCNC: 4.6 MG/DL (ref 0–1)
BILIRUB SERPL-MCNC: 5.3 MG/DL (ref 0–1)
BILIRUBIN DIRECT: 0.6 MG/DL (ref 0–0.3)
BILIRUBIN, INDIRECT: 4.7 MG/DL (ref 0–1)
BLOOD BANK DISPENSE STATUS: NORMAL
BLOOD BANK DISPENSE STATUS: NORMAL
BLOOD BANK PRODUCT CODE: NORMAL
BLOOD BANK PRODUCT CODE: NORMAL
BPU ID: NORMAL
BPU ID: NORMAL
BUN BLDV-MCNC: 9 MG/DL (ref 7–20)
CALCIUM SERPL-MCNC: 10.3 MG/DL (ref 8.3–10.6)
CHLORIDE BLD-SCNC: 104 MMOL/L (ref 99–110)
CO2: 23 MMOL/L (ref 21–32)
CREAT SERPL-MCNC: 0.7 MG/DL (ref 0.9–1.3)
D DIMER: 448 NG/ML DDU (ref 0–229)
DESCRIPTION BLOOD BANK: NORMAL
DESCRIPTION BLOOD BANK: NORMAL
EKG ATRIAL RATE: 75 BPM
EKG DIAGNOSIS: NORMAL
EKG P AXIS: 35 DEGREES
EKG P-R INTERVAL: 148 MS
EKG Q-T INTERVAL: 392 MS
EKG QRS DURATION: 100 MS
EKG QTC CALCULATION (BAZETT): 437 MS
EKG R AXIS: 74 DEGREES
EKG T AXIS: -18 DEGREES
EKG VENTRICULAR RATE: 75 BPM
EOSINOPHILS ABSOLUTE: 0.2 K/UL (ref 0–0.6)
EOSINOPHILS RELATIVE PERCENT: 1.6 %
GFR AFRICAN AMERICAN: >60
GFR NON-AFRICAN AMERICAN: >60
GLUCOSE BLD-MCNC: 111 MG/DL (ref 70–99)
HCT VFR BLD CALC: 18.8 % (ref 40.5–52.5)
HEMATOLOGY PATH CONSULT: NO
HEMOGLOBIN: 6.6 G/DL (ref 13.5–17.5)
LYMPHOCYTES ABSOLUTE: 2 K/UL (ref 1–5.1)
LYMPHOCYTES RELATIVE PERCENT: 14.4 %
MAGNESIUM: 2.2 MG/DL (ref 1.8–2.4)
MCH RBC QN AUTO: 34.2 PG (ref 26–34)
MCHC RBC AUTO-ENTMCNC: 35.2 G/DL (ref 31–36)
MCV RBC AUTO: 97.3 FL (ref 80–100)
MONOCYTES ABSOLUTE: 1.7 K/UL (ref 0–1.3)
MONOCYTES RELATIVE PERCENT: 12.7 %
NEUTROPHILS ABSOLUTE: 9.6 K/UL (ref 1.7–7.7)
NEUTROPHILS RELATIVE PERCENT: 70.3 %
PDW BLD-RTO: 21.4 % (ref 12.4–15.4)
PLATELET # BLD: 375 K/UL (ref 135–450)
PMV BLD AUTO: 8.3 FL (ref 5–10.5)
POTASSIUM SERPL-SCNC: 4 MMOL/L (ref 3.5–5.1)
RBC # BLD: 1.93 M/UL (ref 4.2–5.9)
SODIUM BLD-SCNC: 137 MMOL/L (ref 136–145)
TOTAL PROTEIN: 7.3 G/DL (ref 6.4–8.2)
TOTAL PROTEIN: 7.7 G/DL (ref 6.4–8.2)
WBC # BLD: 13.6 K/UL (ref 4–11)

## 2022-05-02 PROCEDURE — 85660 RBC SICKLE CELL TEST: CPT

## 2022-05-02 PROCEDURE — 6360000004 HC RX CONTRAST MEDICATION: Performed by: INTERNAL MEDICINE

## 2022-05-02 PROCEDURE — 86900 BLOOD TYPING SEROLOGIC ABO: CPT

## 2022-05-02 PROCEDURE — 1200000000 HC SEMI PRIVATE

## 2022-05-02 PROCEDURE — 2500000003 HC RX 250 WO HCPCS: Performed by: INTERNAL MEDICINE

## 2022-05-02 PROCEDURE — 6360000002 HC RX W HCPCS: Performed by: INTERNAL MEDICINE

## 2022-05-02 PROCEDURE — 71260 CT THORAX DX C+: CPT

## 2022-05-02 PROCEDURE — 36430 TRANSFUSION BLD/BLD COMPNT: CPT

## 2022-05-02 PROCEDURE — 80053 COMPREHEN METABOLIC PANEL: CPT

## 2022-05-02 PROCEDURE — 86850 RBC ANTIBODY SCREEN: CPT

## 2022-05-02 PROCEDURE — G0378 HOSPITAL OBSERVATION PER HR: HCPCS

## 2022-05-02 PROCEDURE — 2500000003 HC RX 250 WO HCPCS: Performed by: STUDENT IN AN ORGANIZED HEALTH CARE EDUCATION/TRAINING PROGRAM

## 2022-05-02 PROCEDURE — 86901 BLOOD TYPING SEROLOGIC RH(D): CPT

## 2022-05-02 PROCEDURE — 83735 ASSAY OF MAGNESIUM: CPT

## 2022-05-02 PROCEDURE — 6370000000 HC RX 637 (ALT 250 FOR IP): Performed by: INTERNAL MEDICINE

## 2022-05-02 PROCEDURE — 85025 COMPLETE CBC W/AUTO DIFF WBC: CPT

## 2022-05-02 PROCEDURE — 36415 COLL VENOUS BLD VENIPUNCTURE: CPT

## 2022-05-02 PROCEDURE — 86923 COMPATIBILITY TEST ELECTRIC: CPT

## 2022-05-02 PROCEDURE — P9016 RBC LEUKOCYTES REDUCED: HCPCS

## 2022-05-02 PROCEDURE — 93010 ELECTROCARDIOGRAM REPORT: CPT | Performed by: INTERNAL MEDICINE

## 2022-05-02 PROCEDURE — 86902 BLOOD TYPE ANTIGEN DONOR EA: CPT

## 2022-05-02 RX ORDER — IBUPROFEN 400 MG/1
800 TABLET ORAL EVERY 6 HOURS PRN
Status: DISCONTINUED | OUTPATIENT
Start: 2022-05-02 | End: 2022-05-03 | Stop reason: HOSPADM

## 2022-05-02 RX ORDER — ONDANSETRON 2 MG/ML
4 INJECTION INTRAMUSCULAR; INTRAVENOUS EVERY 6 HOURS PRN
Status: DISCONTINUED | OUTPATIENT
Start: 2022-05-02 | End: 2022-05-03 | Stop reason: HOSPADM

## 2022-05-02 RX ORDER — SODIUM CHLORIDE 9 MG/ML
INJECTION, SOLUTION INTRAVENOUS PRN
Status: DISCONTINUED | OUTPATIENT
Start: 2022-05-02 | End: 2022-05-03 | Stop reason: HOSPADM

## 2022-05-02 RX ORDER — ONDANSETRON 4 MG/1
4 TABLET, ORALLY DISINTEGRATING ORAL EVERY 8 HOURS PRN
Status: DISCONTINUED | OUTPATIENT
Start: 2022-05-02 | End: 2022-05-03 | Stop reason: HOSPADM

## 2022-05-02 RX ORDER — HYDROXYUREA 500 MG/1
1000 CAPSULE ORAL DAILY
Status: DISCONTINUED | OUTPATIENT
Start: 2022-05-02 | End: 2022-05-03 | Stop reason: HOSPADM

## 2022-05-02 RX ORDER — POTASSIUM CHLORIDE 7.45 MG/ML
10 INJECTION INTRAVENOUS PRN
Status: DISCONTINUED | OUTPATIENT
Start: 2022-05-02 | End: 2022-05-03 | Stop reason: HOSPADM

## 2022-05-02 RX ORDER — FOLIC ACID 1 MG/1
2 TABLET ORAL DAILY
Status: DISCONTINUED | OUTPATIENT
Start: 2022-05-02 | End: 2022-05-03 | Stop reason: HOSPADM

## 2022-05-02 RX ORDER — ENOXAPARIN SODIUM 100 MG/ML
40 INJECTION SUBCUTANEOUS DAILY
Status: DISCONTINUED | OUTPATIENT
Start: 2022-05-02 | End: 2022-05-03 | Stop reason: HOSPADM

## 2022-05-02 RX ORDER — ACETAMINOPHEN 325 MG/1
650 TABLET ORAL EVERY 6 HOURS PRN
Status: DISCONTINUED | OUTPATIENT
Start: 2022-05-02 | End: 2022-05-03 | Stop reason: HOSPADM

## 2022-05-02 RX ORDER — POTASSIUM CHLORIDE 20 MEQ/1
40 TABLET, EXTENDED RELEASE ORAL PRN
Status: DISCONTINUED | OUTPATIENT
Start: 2022-05-02 | End: 2022-05-03 | Stop reason: HOSPADM

## 2022-05-02 RX ORDER — KETOROLAC TROMETHAMINE 30 MG/ML
30 INJECTION, SOLUTION INTRAMUSCULAR; INTRAVENOUS 3 TIMES DAILY
Status: DISCONTINUED | OUTPATIENT
Start: 2022-05-02 | End: 2022-05-03 | Stop reason: HOSPADM

## 2022-05-02 RX ORDER — LANOLIN ALCOHOL/MO/W.PET/CERES
3 CREAM (GRAM) TOPICAL NIGHTLY PRN
Status: DISCONTINUED | OUTPATIENT
Start: 2022-05-02 | End: 2022-05-03 | Stop reason: HOSPADM

## 2022-05-02 RX ORDER — ALBUTEROL SULFATE 2.5 MG/3ML
2.5 SOLUTION RESPIRATORY (INHALATION) EVERY 4 HOURS PRN
Status: DISCONTINUED | OUTPATIENT
Start: 2022-05-02 | End: 2022-05-03 | Stop reason: HOSPADM

## 2022-05-02 RX ORDER — ACETAMINOPHEN 650 MG/1
650 SUPPOSITORY RECTAL EVERY 6 HOURS PRN
Status: DISCONTINUED | OUTPATIENT
Start: 2022-05-02 | End: 2022-05-03 | Stop reason: HOSPADM

## 2022-05-02 RX ORDER — LIDOCAINE HYDROCHLORIDE 10 MG/ML
1 INJECTION, SOLUTION EPIDURAL; INFILTRATION; INTRACAUDAL; PERINEURAL ONCE
Status: DISCONTINUED | OUTPATIENT
Start: 2022-05-02 | End: 2022-05-03 | Stop reason: HOSPADM

## 2022-05-02 RX ORDER — MAGNESIUM SULFATE 1 G/100ML
1000 INJECTION INTRAVENOUS PRN
Status: DISCONTINUED | OUTPATIENT
Start: 2022-05-02 | End: 2022-05-03 | Stop reason: HOSPADM

## 2022-05-02 RX ORDER — OXYCODONE HYDROCHLORIDE 5 MG/1
10 TABLET ORAL EVERY 4 HOURS PRN
Status: DISCONTINUED | OUTPATIENT
Start: 2022-05-02 | End: 2022-05-03 | Stop reason: HOSPADM

## 2022-05-02 RX ORDER — HYDROMORPHONE HCL 110MG/55ML
0.5 PATIENT CONTROLLED ANALGESIA SYRINGE INTRAVENOUS EVERY 4 HOURS PRN
Status: DISCONTINUED | OUTPATIENT
Start: 2022-05-02 | End: 2022-05-03

## 2022-05-02 RX ORDER — PANTOPRAZOLE SODIUM 40 MG/1
40 TABLET, DELAYED RELEASE ORAL
Status: DISCONTINUED | OUTPATIENT
Start: 2022-05-02 | End: 2022-05-03 | Stop reason: HOSPADM

## 2022-05-02 RX ORDER — SODIUM CHLORIDE 0.9 % (FLUSH) 0.9 %
10 SYRINGE (ML) INJECTION PRN
Status: DISCONTINUED | OUTPATIENT
Start: 2022-05-02 | End: 2022-05-03 | Stop reason: HOSPADM

## 2022-05-02 RX ADMIN — HYDROMORPHONE HYDROCHLORIDE 0.5 MG: 2 INJECTION, SOLUTION INTRAMUSCULAR; INTRAVENOUS; SUBCUTANEOUS at 05:45

## 2022-05-02 RX ADMIN — PANTOPRAZOLE SODIUM 40 MG: 40 TABLET, DELAYED RELEASE ORAL at 05:45

## 2022-05-02 RX ADMIN — OXYCODONE 10 MG: 5 TABLET ORAL at 16:54

## 2022-05-02 RX ADMIN — HYDROMORPHONE HYDROCHLORIDE 0.5 MG: 2 INJECTION, SOLUTION INTRAMUSCULAR; INTRAVENOUS; SUBCUTANEOUS at 14:12

## 2022-05-02 RX ADMIN — KETOROLAC TROMETHAMINE 30 MG: 30 INJECTION, SOLUTION INTRAMUSCULAR at 14:12

## 2022-05-02 RX ADMIN — IOPAMIDOL 75 ML: 755 INJECTION, SOLUTION INTRAVENOUS at 21:18

## 2022-05-02 RX ADMIN — HYDROMORPHONE HYDROCHLORIDE 0.5 MG: 2 INJECTION, SOLUTION INTRAMUSCULAR; INTRAVENOUS; SUBCUTANEOUS at 18:18

## 2022-05-02 RX ADMIN — KETOROLAC TROMETHAMINE 30 MG: 30 INJECTION, SOLUTION INTRAMUSCULAR at 20:54

## 2022-05-02 RX ADMIN — POTASSIUM CHLORIDE, DEXTROSE MONOHYDRATE AND SODIUM CHLORIDE 1000 ML: 300; 5; 450 INJECTION, SOLUTION INTRAVENOUS at 09:22

## 2022-05-02 RX ADMIN — OXYCODONE 10 MG: 5 TABLET ORAL at 11:41

## 2022-05-02 RX ADMIN — KETOROLAC TROMETHAMINE 30 MG: 30 INJECTION, SOLUTION INTRAMUSCULAR at 09:22

## 2022-05-02 RX ADMIN — POTASSIUM CHLORIDE, DEXTROSE MONOHYDRATE AND SODIUM CHLORIDE 1000 ML: 300; 5; 450 INJECTION, SOLUTION INTRAVENOUS at 00:01

## 2022-05-02 RX ADMIN — HYDROMORPHONE HYDROCHLORIDE 0.5 MG: 2 INJECTION, SOLUTION INTRAMUSCULAR; INTRAVENOUS; SUBCUTANEOUS at 09:46

## 2022-05-02 RX ADMIN — OXYCODONE 10 MG: 5 TABLET ORAL at 07:24

## 2022-05-02 RX ADMIN — HYDROMORPHONE HYDROCHLORIDE 0.5 MG: 2 INJECTION, SOLUTION INTRAMUSCULAR; INTRAVENOUS; SUBCUTANEOUS at 22:57

## 2022-05-02 ASSESSMENT — ENCOUNTER SYMPTOMS
ABDOMINAL PAIN: 0
NAUSEA: 0
COUGH: 0
SHORTNESS OF BREATH: 0
BACK PAIN: 0
EYE PAIN: 0
SORE THROAT: 0
DIARRHEA: 0
VOMITING: 0

## 2022-05-02 ASSESSMENT — PAIN DESCRIPTION - LOCATION
LOCATION: CHEST;HIP
LOCATION: CHEST
LOCATION: CHEST;HIP
LOCATION: CHEST

## 2022-05-02 ASSESSMENT — PAIN DESCRIPTION - ORIENTATION
ORIENTATION: MID
ORIENTATION: MID

## 2022-05-02 ASSESSMENT — PAIN SCALES - GENERAL
PAINLEVEL_OUTOF10: 9
PAINLEVEL_OUTOF10: 8
PAINLEVEL_OUTOF10: 8
PAINLEVEL_OUTOF10: 9
PAINLEVEL_OUTOF10: 8
PAINLEVEL_OUTOF10: 8
PAINLEVEL_OUTOF10: 6
PAINLEVEL_OUTOF10: 8
PAINLEVEL_OUTOF10: 8

## 2022-05-02 ASSESSMENT — PAIN DESCRIPTION - DESCRIPTORS
DESCRIPTORS: ACHING;DISCOMFORT;SHOOTING
DESCRIPTORS: ACHING;DISCOMFORT

## 2022-05-02 NOTE — PROGRESS NOTES
Shift assessment completed. Pt A&O x4, VSS. Non skid socks on. Pt independent with ambulation. Informed pt of needing blood transfusion today, pt verbalized understanding. Awaiting urology consult. Denies any needs at this time. Bed locked and in lowest position. Call light and bedside table within reach. Will continue to monitor.

## 2022-05-02 NOTE — PROGRESS NOTES
Spoke with Dr. Sriram Ahmadi about low h&h and he wants me to pass on to Dr. Toribio Wade at 7 to decide course of treatment since he may not need a transfusion.

## 2022-05-02 NOTE — PROGRESS NOTES
Chart reviewed. Pt admitted overnight with rt sided chest pain, sickle cell crisis. Pt non compliant with chronic meds. Hemonc consulted. Recs noted and appreciated- acute chest syndrome  thought unlikely. PRBCs ordered for hgb 6.6. Recent CTA pulm neg for acute PE. D dimer on admission was elevated, check CTA pulm as rt sided chest pain persistent though improved today. Continue supportive care per orders.  Discussed with RN, pt

## 2022-05-02 NOTE — CONSULTS
Urology Attending Consult Note      Reason for Consultation: Priapism    History: This is a 24 y.o. male who presented with right-sided chest pain and sickle cell crisis. Urology is consulted for evaluation of stuttering priapism. Urologic history is pertinent for priapism takedown in the emergency room by me on 2022. Patient states he had an erection this morning-he put some ice on it and now it has resolved    Imaging: NA    Family History, Social History, Review of Systems:  Reviewed and agreed to as per chart    Vitals:  /72   Pulse 68   Temp 97.8 °F (36.6 °C) (Oral)   Resp 18   Ht 5' 8\" (1.727 m)   Wt 187 lb 14.4 oz (85.2 kg)   SpO2 92%   BMI 28.57 kg/m²   Temp  Av.2 °F (36.8 °C)  Min: 97.8 °F (36.6 °C)  Max: 98.7 °F (37.1 °C)    Intake/Output Summary (Last 24 hours) at 2022 1108  Last data filed at 2022 0545  Gross per 24 hour   Intake 1150.73 ml   Output 300 ml   Net 850.73 ml         Physical:   Well developed, well nourished in no acute distress   Mood indicates no abnormalities. Pt doesnt appear depressed   Orientated to time and place   Neck is supple, trachea is midline   Respiratory effort is normal   Cardiovascular show no extremity swelling   Abdomen no masses or hernias are palpated, there is no tenderness. Liver and Spleen appear normal.   Skin show no abnormal lesions   Lymph nodes are not palpated in the inguinal, neck, or axillary area.   Penis is flaccid-able to bend-he does have some beginnings of distal corporal fibrosis but no erection at this time  Labs:  WBC:    Lab Results   Component Value Date    WBC 13.6 2022     Hemoglobin/Hematocrit:    Lab Results   Component Value Date    HGB 6.6 2022    HCT 18.8 2022     BMP:    Lab Results   Component Value Date     2022    K 4.0 2022    K 4.7 2022     2022    CO2 23 2022    BUN 9 2022    LABALBU 4.2 2022    CREATININE 0.7 05/02/2022    CALCIUM 10.3 05/02/2022    GFRAA >60 05/02/2022    LABGLOM >60 05/02/2022     PT/INR:    Lab Results   Component Value Date    PROTIME 14.4 09/28/2021    INR 1.26 09/28/2021     PTT:    Lab Results   Component Value Date    APTT 43.5 10/19/2021   [APTT        Impression/Plan:   Stuttering priapism    --At this time the patient does not have a priapism-physical exam demonstrates a bendable penis with no pain-there is some distal corporal fibrosis noted  --Continue supplemental oxygen  --Transfused to bring hemoglobin higher as this can prevent sickling which can cause stuttering priapism -defer to primary  -- Patient should see me as an outpatient-there is some experimental therapies for starting priapism such as daily sildenafil, Lupron, Botox which the patient should consider-he is very young and recurrent episodes of priapism could predispose him to irreversible erectile dysfunction and need for a penile implant-patient David Obrien is incredibly noncompliant though and hard to know if he will actually follow-up    Thank you for the opportunity to be involved in the care of this patient - please call with questions    Aleks Foster MD  [facility]  Office - [phone]

## 2022-05-02 NOTE — CONSULTS
ONCOLOGY HEMATOLOGY CARE PROGRESS NOTE      SUBJECTIVE:     Javier said his right-sided chest pain is little better today. He is sitting up in bed and appears to be doing quite well. ROS:     Constitutional:  No weight loss, No fever, No chills, No night sweats. Energy level good. Eyes:  No impairment or change in vision  ENT / Mouth:  No pain, abnormal ulceration, bleeding, nasal drip or change in voice or hearing  Cardiovascular:  No chest pain, palpitations, new edema, or calf discomfort  Respiratory:  No pain, hemoptysis, change to breathing  Breast:  No pain, discharge, change in appearance or texture  Gastrointestinal:  No pain, cramping, jaundice, change to eating and bowel habits  Urinary:  No pain, bleeding or change in continence  Genitalia: No pain, bleeding or discharge  Musculoskeletal:  No redness, pain, edema or weakness  Skin:  No pruritus, rash, change to nodules or lesions  Neurologic:  No discomfort, change in mental status, speech, sensory or motor activity  Psychiatric:  No change in concentration or change to affect or mood  Endocrine:  No hot flashes, increased thirst, or change to urine production  Hematologic: No petechiae, ecchymosis or bleeding  Lymphatic:  No lymphadenopathy or lymphedema  Allergy / Immunologic:  No eczema, hives, frequent or recurrent infections    OBJECTIVE        Physical    VITALS:  /72   Pulse 68   Temp 97.8 °F (36.6 °C) (Oral)   Resp 18   Ht 5' 8\" (1.727 m)   Wt 187 lb 14.4 oz (85.2 kg)   SpO2 92%   BMI 28.57 kg/m²   TEMPERATURE:  Current - Temp: 97.8 °F (36.6 °C);  Max - Temp  Av.2 °F (36.8 °C)  Min: 97.8 °F (36.6 °C)  Max: 98.7 °F (37.1 °C)  PULSE OXIMETRY RANGE: SpO2  Av.4 %  Min: 91 %  Max: 100 %  24HR INTAKE/OUTPUT:    Intake/Output Summary (Last 24 hours) at 2022 0851  Last data filed at 2022 0597  Gross per 24 hour   Intake 1150.73 ml   Output 300 ml   Net 850.73 ml CONSTITUTIONAL: awake, alert, cooperative, no apparent distress   EYES: pupils equal, round and reactive to light, sclera clear and conjunctiva normal  ENT: Normocephalic, without obvious abnormality, atraumatic  NECK: supple, symmetrical, no jugular venous distension and no carotid bruits   HEMATOLOGIC/LYMPHATIC: no cervical, supraclavicular or axillary lymphadenopathy   LUNGS: no increased work of breathing and clear to auscultation   CARDIOVASCULAR: regular rate and rhythm, normal S1 and S2, no murmur noted  ABDOMEN: normal bowel sounds x 4, soft, non-distended, non-tender, no masses palpated, no hepatosplenomgaly   MUSCULOSKELETAL: full range of motion noted, tone is normal  NEUROLOGIC: awake, alert, oriented to name, place and time. Motor skills grossly intact. SKIN: Normal skin color, texture, turgor and no jaundice.  appears intact   EXTREMITIES: no LE edema       Data      Recent Labs     05/01/22 2157 05/02/22  0618   WBC 17.3* 13.6*   HGB 7.4* 6.6*   HCT 21.3* 18.8*    375   MCV 96.3 97.3        Recent Labs     05/01/22 2157 05/02/22  0618    137   K 4.7 4.0    104   CO2 25 23   BUN 7 9   CREATININE 0.7* 0.7*     Recent Labs     05/01/22 2157 05/02/22  0618   AST 27 22   ALT 10 9*   BILIDIR 0.6*  --    BILITOT 5.3* 4.6*   ALKPHOS 90 77       Magnesium:    Lab Results   Component Value Date    MG 2.20 05/02/2022    MG 2.10 01/16/2022    MG 2.00 09/13/2021         Problem List  Patient Active Problem List   Diagnosis    S[facility]    Asthma    Sickle cell anemia (HCC)    Sepsis [facility]    Opiate overdose [facility]    Opiate antagonist poisoning, accidental or unintentional, initial encounter    Leukocytosis    Hypoxia    Anemia    Other chest pain    Pneumonia due to infectious organism    Fever    Chronic prescription opiate use    Right leg pain    Dental infection    Sickle cell crisis ([facility]    History of DVT in adulthood    Overweight (BMI 25.0-29. 9)       ASSESSMENT AND PLAN:    Sickle cell crisis:  -He is incredibly noncompliant  -He is not taking his Eliquis as directed  -He quit taking his Hydrea  -He was not showing up for his IV crizanlizumab  -He refuses to see a pain specialist and has been fired by at least 1  -He refuses a support group as supplied by his insurance company  -His narcotics have to be carefully controlled for concern of an overdose.     Right sided chest pain:  -This seems very minor this morning  -He will receive 2 units of packed red blood cells  -We will add Toradol 30 mg IV every 8x8 doses  -I do not feel that he has acute chest pain syndrome    DVT:  -Currently on Lovenox  -He will need to go back on his Eliquis upon discharge    Family conference:  -I tried to get his father in for a family conference  -There seems to be a lot of family friction  -His father said he will only come in late Wednesdays and unfortunately I do not have office hours at that time        ONCOLOGIC DISPOSITION:    -Once his pain subsides    Danisha Venegas MD  Please contact through [employer] [employer]

## 2022-05-02 NOTE — PROGRESS NOTES
Admitted pt to c341 in stable condition. VSS. IVF infusion. Admission and assessment complete and charted. Pt sleepy but easily aroused. Pt c/o pain but prn are not available at this time. Oriented pt to room, call light, pain regimen and POC. Pt refused to let lab draw more blood. Pt stable and denied needs when writer left room.

## 2022-05-02 NOTE — PLAN OF CARE
Problem: Pain  Goal: Verbalizes/displays adequate comfort level or baseline comfort level  Outcome: Progressing   Pt reporting pain 8/10 on the pain scale, PRN pain medication given per MAR. Will continue to monitor and reassess pain as needed.

## 2022-05-02 NOTE — ED PROVIDER NOTES
[facility]  ED  EMERGENCY DEPARTMENT ENCOUNTER      Pt Name: Xiao Humphrey  MRN: [id number]  [place] 2000  Date of evaluation: 5/1/2022  Provider: Yanelis Murillo MD    CHIEF COMPLAINT       Chief Complaint   Patient presents with    Chest Pain     started Tuesday; left sided; 8/10 pain     Chest pain    HISTORY OF PRESENT ILLNESS   (Location/Symptom, Timing/Onset,Context/Setting, Quality, Duration, Modifying Factors, Severity)  Note limiting factors. Xiao Humphrey is a 24 y.o. male who presents to the ED with a chief complaint of chest pain for the past 5 days. Onset gradual, described as right-sided precordial chest pain, radiating through to the back, similar to the episode of chest pain he had on his last ED evaluation on 428 but with worsening, increasingly severe, rates 9 out of 10 in severity. Patient states difficulty complying with his medications. Associated with shortness of breath. Denies fevers, cough, hemoptysis, leg swelling. Symptoms not otherwise alleviated or exacerbated by other factors. NursingNotes were reviewed. REVIEW OF SYSTEMS    (2-9 systems for level 4, 10 or more for level 5)     Review of Systems   Constitutional: Negative for chills and fever. HENT: Negative for congestion and sore throat. Eyes: Negative for pain and visual disturbance. Respiratory: Negative for cough and shortness of breath. Cardiovascular: Positive for chest pain. Negative for palpitations. Gastrointestinal: Negative for abdominal pain, diarrhea, nausea and vomiting. Genitourinary: Negative for dysuria and frequency. Musculoskeletal: Negative for back pain and neck pain. Skin: Negative for rash and wound. Neurological: Negative for dizziness, weakness and light-headedness.         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)     Sickle cell anemia (HCC)          SURGICALHISTORY       Past Surgical History:   Procedure Laterality Date    SPLENECTOMY           CURRENT MEDICATIONS       Previous Medications    ALBUTEROL SULFATE HFA (PROAIR HFA) 108 (90 BASE) MCG/ACT INHALER    Albuterol HFA Inhaler 90 mcg/actuation  inhaled  2 puffs prn     Active    APIXABAN (ELIQUIS) 5 MG TABS TABLET    Take 1 tablet by mouth 2 times daily    DOCUSATE (COLACE, DULCOLAX) 100 MG CAPS    Take 100 mg by mouth    FOLIC ACID (FOLVITE) 1 MG TABLET    Take 2 tablets by mouth daily    HYDROXYUREA (HYDREA) 500 MG CHEMO CAPSULE    Take 1,000 mg by mouth daily     IBUPROFEN (ADVIL;MOTRIN) 800 MG TABLET    ibuprofen 800 MG Oral Tablet  oral   prn    Active    LEVALBUTEROL (XOPENEX) 0.31 MG/3ML NEBULIZATION    Levalbuterol Nebulized    2 puffs prn     Active    OXYCODONE HCL (OXY-IR) 10 MG IMMEDIATE RELEASE TABLET    Take 10 mg by mouth every 4 hours as needed for Pain.     PANTOPRAZOLE (PROTONIX) 40 MG TABLET    Take 1 tablet by mouth every morning (before breakfast)       ALLERGIES     Morphine    FAMILY HISTORY       Family History   Problem Relation Age of Onset    Other Father         sarcoidosis          SOCIAL HISTORY       Social History     Socioeconomic History    Marital status: Single     Spouse name: None    Number of children: 0    Years of education: None    Highest education level: None   Occupational History    None   Tobacco Use    Smoking status: Never Smoker    Smokeless tobacco: Never Used   Vaping Use    Vaping Use: Never used   Substance and Sexual Activity    Alcohol use: Not Currently    Drug use: Never    Sexual activity: Not Currently   Other Topics Concern    None   Social History Narrative    None     Social Determinants of Health     Financial Resource Strain:     Difficulty of Paying Living Expenses: Not on file   Food Insecurity:     Worried About Running Out of Food in the Last Year: Not on file    Ran Out of Food in the Last Year: Not on file   Transportation Needs:     Lack of Transportation (Medical): Not on file    Lack of Transportation (Non-Medical): Not on file   Physical Activity:     Days of Exercise per Week: Not on file    Minutes of Exercise per Session: Not on file   Stress:     Feeling of Stress : Not on file   Social Connections:     Frequency of Communication with Friends and Family: Not on file    Frequency of Social Gatherings with Friends and Family: Not on file    Attends Sabianist Services: Not on file    Active Member of [address] [place] [address] [employer] or Organizations: Not on file    Attends Club or Organization Meetings: Not on file    Marital Status: Not on file   Intimate Partner Violence:     Fear of Current or Ex-Partner: Not on file    Emotionally Abused: Not on file    Physically Abused: Not on file    Sexually Abused: Not on file   Housing Stability:     Unable to Pay for Housing in the Last Year: Not on file    Number of [place] in the Last Year: Not on file    Unstable Housing in the Last Year: Not on file       SCREENINGS    [place] Coma Scale  Eye Opening: Spontaneous  Best Verbal Response: Oriented  Best Motor Response: Obeys commands  [place] Coma Scale Score: 15        PHYSICAL EXAM    (up to 7 for level 4, 8 or more for level 5)     ED Triage Vitals   BP Temp Temp Source Pulse Resp SpO2 Height Weight   05/01/22 2033 05/01/22 2033 05/02/22 0005 05/01/22 2033 05/01/22 2033 05/01/22 2033 05/01/22 2033 05/01/22 2033   (!) 125/55 98.6 °F (37 °C) Oral 75 21 96 % 5' 8\" (1.727 m) 185 lb (83.9 kg)       General: Alert and oriented appropriately for age, No acute distress. Eye: Normal conjunctiva. Sclera anicteric. HENT: Oral mucosa is moist.  Respiratory: Respirations even and non-labored. Clear to auscultation bilaterally. No chest wall tenderness to palpation. Cardiovascular: Normal rate, Regular rhythm. Intact peripheral pulses. No edema.   No JVD.  Gastrointestinal: Soft, Non-tender, Non-distended. : deferred. Musculoskeletal: No swelling. Integumentary: Warm, Dry. Neurologic: Alert and appropriate for age. No focal deficits. Psychiatric: Cooperative. DIAGNOSTIC RESULTS     EKG: All EKG's are interpreted by the Emergency Department Physician who either signs or Co-signsthis chart in the absence of a cardiologist.    The Ekg interpreted by me shows  Rhythm normal sinus rhythm  Rate of 75 bpm  Axis is normal  Intervals and durations normal  ST Segments: Nonspecific ST abnormalities. LVH. Compared to prior EKG dated April 28, 2022, slight ST segment changes in the inferior leads. No ST elevation. RADIOLOGY:   Non-plain filmimages such as CT, Ultrasound and MRI are read by the radiologist. Plain radiographic images are visualized and preliminarily interpreted by the emergency physician with the below findings:      Interpretation per the Radiologist below, if available at the time ofthis note:    XR CHEST (2 VW)   Final Result   No acute cardiopulmonary disease. LABS:  Labs Reviewed   CBC WITH AUTO DIFFERENTIAL - Abnormal; Notable for the following components:       Result Value    WBC 17.3 (*)     RBC 2.21 (*)     Hemoglobin 7.4 (*)     Hematocrit 21.3 (*)     RDW 22.3 (*)     Neutrophils Absolute 12.8 (*)     Monocytes Absolute 2.1 (*)     All other components within normal limits   BASIC METABOLIC PANEL W/ REFLEX TO MG FOR LOW K - Abnormal; Notable for the following components:    CREATININE 0.7 (*)     Calcium 10.8 (*)     All other components within normal limits   RETICULOCYTES - Abnormal; Notable for the following components:    Retic Ct Pct 13.88 (*)     Immature Retic Fract 0.65 (*)     All other components within normal limits   HEPATIC FUNCTION PANEL - Abnormal; Notable for the following components:     Total Bilirubin 5.3 (*)     Bilirubin, Direct 0.6 (*)     Bilirubin, Indirect 4.7 (*)     All other components within normal limits   TROPONIN   D-DIMER, QUANTITATIVE       All other labs were within normal range or not returned as of this dictation. EMERGENCY DEPARTMENT COURSE and DIFFERENTIAL DIAGNOSIS/MDM:   Vitals:    Vitals:    05/01/22 2033 05/01/22 2218 05/02/22 0005   BP: (!) 125/55 112/67 (!) 103/57   Pulse: 75 81 69   Resp: 21 18 17   Temp: 98.6 °F (37 °C)  98.7 °F (37.1 °C)   TempSrc:   Oral   SpO2: 96% 100% 93%   Weight: 185 lb (83.9 kg)     Height: 5' 8\" (1.727 m)           Medical decision making:  Hiwot Hoskins in 24 is a 23 yo M with PMHx of sickle cell dz who p/w chest pain, similar to last ED presentation on 4/28 but worse in severity per pt, CT-PE obtained at that time showed no PE (pt noncompliant with Eliquis and prior VTE hx). No acute chest or other CXR abnormality this eval, pain persists after multiple rounds IV meds, MMPC. Reticking, Hb 7.4. Follows with Dameon with hematology at [facility]. Admitted to the hospitalist for intractable pain. Medications   dextrose 5 % and 0.45 % NaCl with KCl 40 mEq infusion (1,000 mLs IntraVENous New Bag 5/2/22 0001)   ketorolac (TORADOL) injection 30 mg (30 mg IntraVENous Given 5/1/22 2223)   HYDROmorphone (DILAUDID) injection 2 mg (2 mg IntraVENous Given 5/1/22 2224)   acetaminophen (TYLENOL) tablet 1,000 mg (1,000 mg Oral Given 5/1/22 2226)   oxyCODONE (ROXICODONE) immediate release tablet 10 mg (10 mg Oral Given 5/1/22 2357)   HYDROmorphone (DILAUDID) injection 1 mg (1 mg IntraVENous Given 5/1/22 2358)           FINAL IMPRESSION      1.  Sickle cell pain crisis [facility])          DISPOSITION/PLAN   DISPOSITION Admitted 05/02/2022 12:47:10 AM      (Please note that portions of this note were completed with a voice recognition program.Efforts were made to edit the dictations but occasionally words are mis-transcribed.)    Ashley Wylie MD (electronically signed)  Attending Emergency Physician          Ashley Wylie MD  05/02/22 1755

## 2022-05-02 NOTE — EC ADMISSION CRITERIA
AdmissionCare    Guideline: Sickle Cell Disease, Inpatient    Based on the indications selected for the patient, the bed status of Admit to Inpatient was determined to be MET    The following indications were selected as present at the time of evaluation of the patient:   - Admission is indicated for    - Pain insufficiently responsive to observation care treatment that requires treatment in inpatient setting (eg, parenteral opioids, PCA)    AdmissionCare documentation entered by: [facility], 26th edition, Copyright © 2022 [address] [place] [address].  [id number]:00

## 2022-05-02 NOTE — H&P
Hospital Medicine History & Physical      Patient:  Kang Chong  :   2000  MRN:   [id number]  Date of Service: 22    Chief Complaint   Patient presents with    Chest Pain     started Tuesday; left sided; 8/10 pain       HISTORY OF PRESENT ILLNESS:    Kang Chong is a 24 y.o. male. He has sickle cell anemia and is well known to the ER as well as my service for frequent presentations for pain. He is also well known to [facility] where he follows up with Dr. Iza Lyle. He presents tonight c/o focal pleuritic right anterior chest pain since . He denies dyspnea, cough, and hemoptysis. He was seen in the ER on  for this complaint as well as right hip pain. His evaluation on that day included a CT pulmonary angiogram which was normal.    Patient has never gotten a COVID-19 vaccine. He relates he is concerned about the side effects. He has a h/o RLE DVT for which he was prescribed apixaban. The was an acutely occlusive DVT in the right gastrocnemius vein 10/18/2021. He stopped taking apixaban about 2 months ago because the pain in his leg resolved. Review of Systems:  All pertinent positives and negatives are as noted in the HPI section. All other systems were reviewed and are negative. 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  Authorizing Provider   apixaban (ELIQUIS) 5 MG TABS tablet Take 1 tablet by mouth 2 times daily 21   Salvador Oshea MD   pantoprazole (PROTONIX) 40 MG tablet Take 1 tablet by mouth every morning (before breakfast) 22   Salvador Oshea MD   folic acid (FOLVITE) 1 MG tablet Take 2 tablets by mouth daily 9/16/21   [facility] Or[facility], APRN - CNP   oxyCODONE HCl (OXY-IR) 10 MG immediate release tablet Take 10 mg by mouth every 4 hours as needed for Pain. Historical Provider, MD   albuterol sulfate HFA (PROAIR HFA) 108 (90 Base) MCG/ACT inhaler Albuterol HFA Inhaler 90 mcg/actuation  inhaled  2 puffs prn     Active    Historical Provider, MD   levalbuterol (XOPENEX) 0.31 MG/3ML nebulization Levalbuterol Nebulized    2 puffs prn     Active    Historical Provider, MD   docusate (COLACE, DULCOLAX) 100 MG CAPS Take 100 mg by mouth    Historical Provider, MD   ibuprofen (ADVIL;MOTRIN) 800 MG tablet ibuprofen 800 MG Oral Tablet  oral   prn    Active    Historical Provider, MD   hydroxyurea (HYDREA) 500 MG chemo capsule Take 1,000 mg by mouth daily  12/9/20   Ren Wolf MD   No eliquis x 2 months, no protonix lately    Allergies:   Morphine    Social:   reports that he has never smoked. He has never used smokeless tobacco.   reports previous alcohol use. Social History     Substance and Sexual Activity   Drug Use Never       Family History   Problem Relation Age of Onset    Other Father         sarcoidosis       PHYSICAL EXAM:  I performed this physical examination. Vitals:  Patient Vitals for the past 24 hrs:   BP Temp Temp src Pulse Resp SpO2 Height Weight   05/02/22 0005 (!) 103/57 98.7 °F (37.1 °C) Oral 69 17 93 % -- --   05/01/22 2218 112/67 -- -- 81 18 100 % -- --   05/01/22 2033 (!) 125/55 98.6 °F (37 °C) -- 75 21 96 % 5' 8\" (1.727 m) 185 lb (83.9 kg)       Intake/Output Summary (Last 24 hours) at 5/2/2022 0018  Last data filed at 5/2/2022 0012  Gross per 24 hour   Intake --   Output 300 ml   Net -300 ml     Room air    GEN:  Appearance:  Age appropraite male in NAD . Level of Consciousness:  alert . Orientation:  full    HEENT: Sclera anicteric.  no conjunctival chemosis. moist mucus membranes. no specific or diagnostic oral lesions.     NECK:  no signs of not distended. Carotid pulses  2+.  no cervical lymphadenopathy. no thyromegaly. CV:  regular rhythm. normal S1 & S2.    no murmur. no rub.  no gallop. PULM:  Chest excursion is symmetric. Breath sounds are generally vesicular. Adventitious sounds: There is a pleural rub over the right middle lobe    AB:  Abdominal shape is normal.  Bowel sounds are active. Generally soft to palpation. no tenderness is present. no involuntary guarding. no rebound guarding. EXTR:  Skin is warm. Capillary refill brisk. no specific or pathognomic rash. no clubbing. no pitting edema. no active wound or ulcer. Pulses 2+ x 4    LABS:  Lab Results   Component Value Date    WBC 17.3 (H) 05/01/2022    HGB 7.4 (L) 05/01/2022    HCT 21.3 (L) 05/01/2022    MCV 96.3 05/01/2022     05/01/2022     Lab Results   Component Value Date    CREATININE 0.7 (L) 05/01/2022    BUN 7 05/01/2022     05/01/2022    K 4.7 05/01/2022     05/01/2022    CO2 25 05/01/2022     Lab Results   Component Value Date    ALT 12 04/28/2022    AST 30 04/28/2022    ALKPHOS 87 04/28/2022    BILITOT 4.5 (H) 04/28/2022     Lab Results   Component Value Date    CKTOTAL 101 05/03/2021    TROPONINI <0.01 05/01/2022     No results for input(s): PHART, OZL2AVE, PO2ART in the last 72 hours. IMAGING:  XR CHEST (2 VW)    Result Date: 5/1/2022  EXAMINATION: TWO XRAY VIEWS OF THE CHEST 5/1/2022 10:19 pm COMPARISON: 04/28/2022 HISTORY: ORDERING SYSTEM PROVIDED HISTORY: chest pain TECHNOLOGIST PROVIDED HISTORY: Reason for exam:->chest pain Reason for Exam: chest pain FINDINGS: Heart size is upper limits of normal.  Lungs are grossly clear. Study was obtained with low lung volumes. No acute bone finding. No acute cardiopulmonary disease.        CT CHEST PULMONARY EMBOLISM W CONTRAST    Result Date: 4/28/2022  EXAMINATION: CTA OF THE CHEST 4/28/2022 1:23 pm TECHNIQUE: CTA of the chest was performed after the administration of after the administration of intravenous contrast.  Multiplanar reformatted images are provided for review. MIP images are provided for review. Dose modulation, iterative reconstruction, and/or weight based adjustment of the mA/kV was utilized to reduce the radiation dose to as low as reasonably achievable. COMPARISON: 03/17/2022 HISTORY: ORDERING SYSTEM PROVIDED HISTORY: HX of ssd. now with CP. TECHNOLOGIST PROVIDED HISTORY: Reason for exam:->HX of ssd. now with CP. Decision Support Exception - unselect if not a suspected or confirmed emergency medical condition->Emergency Medical Condition (MA) Reason for Exam: hx ssd; chest pain mid chest Additional signs and symptoms: sob; r/o pe FINDINGS: Pulmonary Arteries: Pulmonary arteries are adequately opacified for evaluation. No evidence of intraluminal filling defect to suggest pulmonary embolism. Main pulmonary artery is normal in caliber. Mediastinum: There is a soft tissue mass seen in the anterior mediastinum suggesting residual thymus. This is unchanged when compared to the prior examination. There is no evidence of mediastinal adenopathy. No other soft tissue mass identified. Cardiac chambers are within normal limits. No pericardial effusion. The heart and pericardium demonstrate no acute abnormality. There is no acute abnormality of the thoracic aorta. Lungs/pleura: The lungs are without acute process. No focal consolidation or pulmonary edema. No evidence of pleural effusion or pneumothorax. There is some patchy ground-glass opacity suggesting air trapping. Findings are stable and unchanged with minimal scarring at the left lung base. Upper Abdomen: Limited images of the upper abdomen are unremarkable. Soft Tissues/Bones: No acute bone or soft tissue abnormality. Bilateral gynecomastia. No evidence of pulmonary embolism or acute pulmonary abnormality. I directly reviewed all recent imaging studies as well as pertinent prior studies. Radiology reports may or may not be available at the time of my review. EKG:  New and pertinent prior tracings were directly reviewed. My interpretation is as follows:  Normal sinus rhythm. Active Hospital Problems    Diagnosis Date Noted    Chronic prescription opiate use [Z79.891] 01/05/2021    Sickle cell anemia (HCC) [D57.1] 07/28/2020    Sickle cell pain crisis ([facility] [address] [D57.00] 07/25/2020       ASSESSMENT & PLAN  Sickle Cell Pain Crisis  -  Right anterior precordial pain w/ pleural rub c/w pleuritis. Has a h/o DVT and is noncompliant with apixaban. Pleuritis could be the result of a peripheral PE and associated pulmonary infarction. CT pulmonary angiogram from 4/28/22 was negative for PE, pulmonary infarction, pleural effusion, etc... Will attempt to rule out with DDimer but if not negative then may have to repeat scan or duplex venous U/S of the legs. In the meantime will treat the patient's pain. His home oxycodone IR 10mg and ibuprofen 800mg are available. Dilaudid made available in 0.5mg IV doses between oxycodone doses strictly for breakthrough pain. Sickle Cell Anemia  -  Hgb baseline has been 7-8 g/dL and currently 7.4 which is adequate. Reticulocyte count is appropriate. TBili baseline is usually 4-5 and currently 5.3.  -  Continue home hydroxyurea and folic acid. DVT prophylaxis: SCDs, lovenox  Code Status:  Full  Disposition:  Observation. Anticipate d/c to home in 1-2 days.     Hellen Busch MD MD

## 2022-05-02 NOTE — PROGRESS NOTES
1 unit of PRBC started at 100mL/hr. Pt A/O x4, VSS. Downtime form signed and checked with SHAKEEL Huston.

## 2022-05-02 NOTE — CARE COORDINATION
CASE MANAGEMENT INITIAL ASSESSMENT      Reviewed chart and completed assessment with patient:bedside  Family present: none  Explained Case Management role/services. Primary contact information:[facility] Decision Maker :   Primary Decision Maker: Reilly Cerrato - Parent - [phone]    Secondary Decision Maker: Salbador Queen - Parent - [phone]          Can this person be reached and be able to respond quickly, such as within a few minutes or hours? Yes    Admit date/status:5/1/22  Diagnosis:sickle cell crisis   Is this a Readmission?:  No      Insurance:aetna   Precert required for SNF: Yes       3 night stay required: No    Living arrangements, Adls, care needs, prior to admission:lives in home with parents    Durable Medical Equipment at home:  Walker__Cane__RTS__ BSC__Shower Chair__  02__ HHN__ CPAP__  BiPap__  Hospital Bed__ W/C___ Other_____    Services in the home and/or outpatient, prior to admission:none    Current PCP:Dameon                                Medications: covered medications    Transportation needs: private     PT/OT recs:none    Hospital Exemption Notification (HEN):needed for SNF    Barriers to discharge:none    Plan/comments:spoke with patient plans on returning home at discharge. IPTA works at [employer].  Denied concerns for d/c. Tamiko Springer RN       ECOC on chart for MD aj

## 2022-05-02 NOTE — ED PROVIDER NOTES
I did not personally evaluate this patient but I was asked to review the EKG. EKG  The Ekg interpreted by myself in the emergency department in the absence of a cardiologist.  normal sinus rhythm with a rate of 75  Axis is   Normal  QTc is  within an acceptable range  Intervals and Durations are unremarkable. Nonspecific ranges leads II, 3, aVF, T wave version lead III, aVF, not significantly changed when compared to prior EKG dated April 28, 2022  No specific ST-T wave changes appreciated. No evidence of acute ischemia.         Sheri Smith MD  05/01/22 6591

## 2022-05-03 VITALS
WEIGHT: 191.1 LBS | HEART RATE: 69 BPM | OXYGEN SATURATION: 96 % | SYSTOLIC BLOOD PRESSURE: 106 MMHG | TEMPERATURE: 98.9 F | RESPIRATION RATE: 16 BRPM | DIASTOLIC BLOOD PRESSURE: 58 MMHG | BODY MASS INDEX: 28.96 KG/M2 | HEIGHT: 68 IN

## 2022-05-03 LAB
A/G RATIO: 1.4 (ref 1.1–2.2)
ALBUMIN SERPL-MCNC: 4.3 G/DL (ref 3.4–5)
ALP BLD-CCNC: 85 U/L (ref 40–129)
ALT SERPL-CCNC: 11 U/L (ref 10–40)
ANION GAP SERPL CALCULATED.3IONS-SCNC: 11 MMOL/L (ref 3–16)
AST SERPL-CCNC: 23 U/L (ref 15–37)
BASOPHILS ABSOLUTE: 0.1 K/UL (ref 0–0.2)
BASOPHILS RELATIVE PERCENT: 1.4 %
BILIRUB SERPL-MCNC: 3.5 MG/DL (ref 0–1)
BUN BLDV-MCNC: 10 MG/DL (ref 7–20)
CALCIUM SERPL-MCNC: 10.7 MG/DL (ref 8.3–10.6)
CHLORIDE BLD-SCNC: 106 MMOL/L (ref 99–110)
CO2: 23 MMOL/L (ref 21–32)
CREAT SERPL-MCNC: 0.7 MG/DL (ref 0.9–1.3)
EOSINOPHILS ABSOLUTE: 0.3 K/UL (ref 0–0.6)
EOSINOPHILS RELATIVE PERCENT: 2.6 %
GFR AFRICAN AMERICAN: >60
GFR NON-AFRICAN AMERICAN: >60
GLUCOSE BLD-MCNC: 104 MG/DL (ref 70–99)
HCT VFR BLD CALC: 24.6 % (ref 40.5–52.5)
HCT VFR BLD CALC: 25 % (ref 40.5–52.5)
HEMATOLOGY PATH CONSULT: NO
HEMOGLOBIN: 8.7 G/DL (ref 13.5–17.5)
HEMOGLOBIN: 8.9 G/DL (ref 13.5–17.5)
LYMPHOCYTES ABSOLUTE: 1.6 K/UL (ref 1–5.1)
LYMPHOCYTES RELATIVE PERCENT: 15.1 %
MAGNESIUM: 1.9 MG/DL (ref 1.8–2.4)
MCH RBC QN AUTO: 31.7 PG (ref 26–34)
MCHC RBC AUTO-ENTMCNC: 35.2 G/DL (ref 31–36)
MCV RBC AUTO: 89.9 FL (ref 80–100)
MONOCYTES ABSOLUTE: 1.2 K/UL (ref 0–1.3)
MONOCYTES RELATIVE PERCENT: 11.9 %
NEUTROPHILS ABSOLUTE: 7.2 K/UL (ref 1.7–7.7)
NEUTROPHILS RELATIVE PERCENT: 69 %
PDW BLD-RTO: 23.8 % (ref 12.4–15.4)
PLATELET # BLD: 452 K/UL (ref 135–450)
PLATELET SLIDE REVIEW: ABNORMAL
PMV BLD AUTO: 8.3 FL (ref 5–10.5)
POTASSIUM SERPL-SCNC: 4.4 MMOL/L (ref 3.5–5.1)
RBC # BLD: 2.74 M/UL (ref 4.2–5.9)
SODIUM BLD-SCNC: 140 MMOL/L (ref 136–145)
TOTAL PROTEIN: 7.4 G/DL (ref 6.4–8.2)
WBC # BLD: 10.5 K/UL (ref 4–11)

## 2022-05-03 PROCEDURE — 85025 COMPLETE CBC W/AUTO DIFF WBC: CPT

## 2022-05-03 PROCEDURE — 83735 ASSAY OF MAGNESIUM: CPT

## 2022-05-03 PROCEDURE — 80053 COMPREHEN METABOLIC PANEL: CPT

## 2022-05-03 PROCEDURE — 6360000002 HC RX W HCPCS: Performed by: INTERNAL MEDICINE

## 2022-05-03 PROCEDURE — 85018 HEMOGLOBIN: CPT

## 2022-05-03 PROCEDURE — 85014 HEMATOCRIT: CPT

## 2022-05-03 PROCEDURE — 2580000003 HC RX 258: Performed by: INTERNAL MEDICINE

## 2022-05-03 PROCEDURE — 6370000000 HC RX 637 (ALT 250 FOR IP): Performed by: INTERNAL MEDICINE

## 2022-05-03 PROCEDURE — 36415 COLL VENOUS BLD VENIPUNCTURE: CPT

## 2022-05-03 RX ADMIN — SODIUM CHLORIDE, PRESERVATIVE FREE 10 ML: 5 INJECTION INTRAVENOUS at 08:42

## 2022-05-03 RX ADMIN — OXYCODONE 10 MG: 5 TABLET ORAL at 10:16

## 2022-05-03 RX ADMIN — PANTOPRAZOLE SODIUM 40 MG: 40 TABLET, DELAYED RELEASE ORAL at 06:15

## 2022-05-03 RX ADMIN — FOLIC ACID 2 MG: 1 TABLET ORAL at 08:41

## 2022-05-03 RX ADMIN — HYDROMORPHONE HYDROCHLORIDE 0.5 MG: 2 INJECTION, SOLUTION INTRAMUSCULAR; INTRAVENOUS; SUBCUTANEOUS at 03:52

## 2022-05-03 RX ADMIN — KETOROLAC TROMETHAMINE 30 MG: 30 INJECTION, SOLUTION INTRAMUSCULAR at 08:40

## 2022-05-03 RX ADMIN — HYDROXYUREA 1000 MG: 500 CAPSULE ORAL at 08:49

## 2022-05-03 RX ADMIN — HYDROMORPHONE HYDROCHLORIDE 0.5 MG: 2 INJECTION, SOLUTION INTRAMUSCULAR; INTRAVENOUS; SUBCUTANEOUS at 08:41

## 2022-05-03 RX ADMIN — OXYCODONE 10 MG: 5 TABLET ORAL at 05:17

## 2022-05-03 ASSESSMENT — PAIN SCALES - GENERAL
PAINLEVEL_OUTOF10: 5
PAINLEVEL_OUTOF10: 7
PAINLEVEL_OUTOF10: 8
PAINLEVEL_OUTOF10: 9
PAINLEVEL_OUTOF10: 8
PAINLEVEL_OUTOF10: 8

## 2022-05-03 ASSESSMENT — PAIN DESCRIPTION - LOCATION
LOCATION: CHEST
LOCATION: CHEST

## 2022-05-03 ASSESSMENT — PAIN - FUNCTIONAL ASSESSMENT: PAIN_FUNCTIONAL_ASSESSMENT: PREVENTS OR INTERFERES SOME ACTIVE ACTIVITIES AND ADLS

## 2022-05-03 NOTE — PROGRESS NOTES
Pt. Resting in bed. Alert/oriented. Vitals and assessment stable as charted. Rates chest pain 8/10; requesting IV dilaudid; administered 0.5 mg dilaudid IVP as per prn order. States he does feel chest pain has improved since admission though and states his breathing feels normal again. Call light in reach. Will continue to monitor.

## 2022-05-03 NOTE — PROGRESS NOTES
Discharge: Pt discharged to home as per order. IV removed. Prescriptions\instructions reviewed. Pt verbalized understanding. Denied questions. Ambulated self off unit per pt request in stable & ambulatory condition. Pt states parents are outside waiting for him.

## 2022-05-03 NOTE — DISCHARGE SUMMARY
ONCOLOGY HEMATOLOGY CARE PROGRESS NOTE      SUBJECTIVE:     Eun Lagunas feels that he can go home today. ROS:     Constitutional:  No weight loss, No fever, No chills, No night sweats. Energy level good. Eyes:  No impairment or change in vision  ENT / Mouth:  No pain, abnormal ulceration, bleeding, nasal drip or change in voice or hearing  Cardiovascular:  No chest pain, palpitations, new edema, or calf discomfort  Respiratory:  No pain, hemoptysis, change to breathing  Breast:  No pain, discharge, change in appearance or texture  Gastrointestinal:  No pain, cramping, jaundice, change to eating and bowel habits  Urinary:  No pain, bleeding or change in continence  Genitalia: No pain, bleeding or discharge  Musculoskeletal:  No redness, pain, edema or weakness  Skin:  No pruritus, rash, change to nodules or lesions  Neurologic:  No discomfort, change in mental status, speech, sensory or motor activity  Psychiatric:  No change in concentration or change to affect or mood  Endocrine:  No hot flashes, increased thirst, or change to urine production  Hematologic: No petechiae, ecchymosis or bleeding  Lymphatic:  No lymphadenopathy or lymphedema  Allergy / Immunologic:  No eczema, hives, frequent or recurrent infections    OBJECTIVE        Physical    VITALS:  BP (!) 106/58   Pulse 69   Temp 98.9 °F (37.2 °C) (Oral)   Resp 16   Ht 5' 8\" (1.727 m)   Wt 191 lb 1.6 oz (86.7 kg)   SpO2 96%   BMI 29.06 kg/m²   TEMPERATURE:  Current - Temp: 98.9 °F (37.2 °C);  Max - Temp  Av.3 °F (36.8 °C)  Min: 97.8 °F (36.6 °C)  Max: 98.9 °F (37.2 °C)  PULSE OXIMETRY RANGE: SpO2  Av.1 %  Min: 90 %  Max: 96 %  24HR INTAKE/OUTPUT:      Intake/Output Summary (Last 24 hours) at 5/3/2022 1236  Last data filed at 2022 2044  Gross per 24 hour   Intake 2366.67 ml   Output --   Net 2366.67 ml       CONSTITUTIONAL: awake, alert, cooperative, no apparent distress   EYES: pupils equal, round and reactive to light, sclera clear and conjunctiva normal  ENT: Normocephalic, without obvious abnormality, atraumatic  NECK: supple, symmetrical, no jugular venous distension and no carotid bruits   HEMATOLOGIC/LYMPHATIC: no cervical, supraclavicular or axillary lymphadenopathy   LUNGS: no increased work of breathing and clear to auscultation   CARDIOVASCULAR: regular rate and rhythm, normal S1 and S2, no murmur noted  ABDOMEN: normal bowel sounds x 4, soft, non-distended, non-tender, no masses palpated, no hepatosplenomgaly   MUSCULOSKELETAL: full range of motion noted, tone is normal  NEUROLOGIC: awake, alert, oriented to name, place and time. Motor skills grossly intact. SKIN: Normal skin color, texture, turgor and no jaundice.  appears intact   EXTREMITIES: no LE edema       Data      Recent Labs     05/01/22 2157 05/02/22 0618 05/03/22  0504   WBC 17.3* 13.6* 10.5   HGB 7.4* 6.6* 8.7*  8.9*   HCT 21.3* 18.8* 24.6*  25.0*    375 452*   MCV 96.3 97.3 89.9        Recent Labs     05/01/22 2157 05/02/22  0618 05/03/22  0504    137 140   K 4.7 4.0 4.4    104 106   CO2 25 23 23   BUN 7 9 10   CREATININE 0.7* 0.7* 0.7*     Recent Labs     05/01/22 2157 05/02/22  0618 05/03/22  0504   AST 27 22 23   ALT 10 9* 11   BILIDIR 0.6*  --   --    BILITOT 5.3* 4.6* 3.5*   ALKPHOS 90 77 85       Magnesium:    Lab Results   Component Value Date    MG 1.90 05/03/2022    MG 2.20 05/02/2022    MG 2.10 01/16/2022         Problem List  Patient Active Problem List   Diagnosis    [facility] crisis ([facility]    Asthma    Sickle cell anemia (HCC)    Sepsis ([facility]    Opiate overdose ([facility] [facility]    Opiate antagonist poisoning, accidental or unintentional, initial encounter    Leukocytosis    Hypoxia    Anemia    Other chest pain    Pneumonia due to infectious organism    Fever    Chronic prescription opiate use    Right leg pain    Dental infection    Sickle cell crisis ([facility] [address] [facility]    History of DVT in adulthood    Overweight (BMI 25.0-29. 9)       ASSESSMENT AND PLAN:    Sickle cell crisis:  -He is incredibly noncompliant  -He is not taking his Eliquis as directed  -He quit taking his Hydrea  -He was not showing up for his IV crizanlizumab  -He refuses to see a pain specialist and has been fired by at least 1  -He refuses a support group as supplied by his insurance company  -His narcotics have to be carefully controlled for concern of an overdose.     Right sided chest pain:  -This seems very minor this morning  -He will receive 2 units of packed red blood cells  -We will add Toradol 30 mg IV every 8x8 doses  -I do not feel that he has acute chest pain syndrome  -He states his pain is much better    DVT:  -Currently on Lovenox  -He will need to go back on his Eliquis upon discharge    Family conference:  -I tried to get his father in for a family conference  -There seems to be a lot of family friction  -His father said he will only come in late Wednesdays and unfortunately I do not have office hours at that time        ONCOLOGIC DISPOSITION:    -He will be discharged today  -He will be discharged to home  -He is in very good condition  -He will be given MS Contin 30 mg p.o. every 12 but only for 4 doses to help with his pain  -Oxycodone will be refilled, but he will only be given 50 tablets since he has been in the hospital for over a day and a half  -He will follow-up in the office next week    Ritu Morley MD  Please contact through [facility]

## 2022-05-03 NOTE — CONSULTS
ONCOLOGY HEMATOLOGY CARE PROGRESS NOTE      SUBJECTIVE:     Aime Cannon feels that he can go home today. ROS:     Constitutional:  No weight loss, No fever, No chills, No night sweats. Energy level good. Eyes:  No impairment or change in vision  ENT / Mouth:  No pain, abnormal ulceration, bleeding, nasal drip or change in voice or hearing  Cardiovascular:  No chest pain, palpitations, new edema, or calf discomfort  Respiratory:  No pain, hemoptysis, change to breathing  Breast:  No pain, discharge, change in appearance or texture  Gastrointestinal:  No pain, cramping, jaundice, change to eating and bowel habits  Urinary:  No pain, bleeding or change in continence  Genitalia: No pain, bleeding or discharge  Musculoskeletal:  No redness, pain, edema or weakness  Skin:  No pruritus, rash, change to nodules or lesions  Neurologic:  No discomfort, change in mental status, speech, sensory or motor activity  Psychiatric:  No change in concentration or change to affect or mood  Endocrine:  No hot flashes, increased thirst, or change to urine production  Hematologic: No petechiae, ecchymosis or bleeding  Lymphatic:  No lymphadenopathy or lymphedema  Allergy / Immunologic:  No eczema, hives, frequent or recurrent infections    OBJECTIVE        Physical    VITALS:  BP (!) 121/59   Pulse 86   Temp 98.1 °F (36.7 °C) (Oral)   Resp 16   Ht 5' 8\" (1.727 m)   Wt 191 lb 1.6 oz (86.7 kg)   SpO2 93%   BMI 29.06 kg/m²   TEMPERATURE:  Current - Temp: 98.1 °F (36.7 °C);  Max - Temp  Av.3 °F (36.8 °C)  Min: 97.8 °F (36.6 °C)  Max: 98.9 °F (37.2 °C)  PULSE OXIMETRY RANGE: SpO2  Av.9 %  Min: 90 %  Max: 95 %  24HR INTAKE/OUTPUT:      Intake/Output Summary (Last 24 hours) at 5/3/2022 0957  Last data filed at 2022  Gross per 24 hour   Intake 2366.67 ml   Output --   Net 2366.67 ml       CONSTITUTIONAL: awake, alert, cooperative, no apparent distress   EYES: pupils equal, round and reactive to light, sclera clear and conjunctiva normal  ENT: Normocephalic, without obvious abnormality, atraumatic  NECK: supple, symmetrical, no jugular venous distension and no carotid bruits   HEMATOLOGIC/LYMPHATIC: no cervical, supraclavicular or axillary lymphadenopathy   LUNGS: no increased work of breathing and clear to auscultation   CARDIOVASCULAR: regular rate and rhythm, normal S1 and S2, no murmur noted  ABDOMEN: normal bowel sounds x 4, soft, non-distended, non-tender, no masses palpated, no hepatosplenomgaly   MUSCULOSKELETAL: full range of motion noted, tone is normal  NEUROLOGIC: awake, alert, oriented to name, place and time. Motor skills grossly intact. SKIN: Normal skin color, texture, turgor and no jaundice.  appears intact   EXTREMITIES: no LE edema       Data      Recent Labs     05/01/22 2157 05/02/22 0618 05/03/22  0504   WBC 17.3* 13.6* 10.5   HGB 7.4* 6.6* 8.7*  8.9*   HCT 21.3* 18.8* 24.6*  25.0*    375 452*   MCV 96.3 97.3 89.9        Recent Labs     05/01/22 2157 05/02/22  0618 05/03/22  0504    137 140   K 4.7 4.0 4.4    104 106   CO2 25 23 23   BUN 7 9 10   CREATININE 0.7* 0.7* 0.7*     Recent Labs     05/01/22 2157 05/02/22  0618 05/03/22  0504   AST 27 22 23   ALT 10 9* 11   BILIDIR 0.6*  --   --    BILITOT 5.3* 4.6* 3.5*   ALKPHOS 90 77 85       Magnesium:    Lab Results   Component Value Date    MG 1.90 05/03/2022    MG 2.20 05/02/2022    MG 2.10 01/16/2022         Problem List  Patient Active Problem List   Diagnosis    [facility] crisis ([facility]    Asthma    Sickle cell anemia (HCC)    Sepsis ([facility]    Opiate overdose ([facility] [facility]    Opiate antagonist poisoning, accidental or unintentional, initial encounter    Leukocytosis    Hypoxia    Anemia    Other chest pain    Pneumonia due to infectious organism    Fever    Chronic prescription opiate use    Right leg pain    Dental infection    Sickle cell crisis ([facility] [facility]    History of DVT in adulthood    Overweight (BMI 25.0-29. 9)       ASSESSMENT AND PLAN:    Sickle cell crisis:  -He is incredibly noncompliant  -He is not taking his Eliquis as directed  -He quit taking his Hydrea  -He was not showing up for his IV crizanlizumab  -He refuses to see a pain specialist and has been fired by at least 1  -He refuses a support group as supplied by his insurance company  -His narcotics have to be carefully controlled for concern of an overdose. Right sided chest pain:  -This seems very minor this morning  -He will receive 2 units of packed red blood cells  -We will add Toradol 30 mg IV every 8x8 doses  -I do not feel that he has acute chest pain syndrome  -He states his pain is much better    DVT:  -Currently on Lovenox  -He will need to go back on his Eliquis upon discharge    Family conference:  -I tried to get his father in for a family conference  -There seems to be a lot of family friction  -His father said he will only come in late Wednesdays and unfortunately I do not have office hours at that time        ONCOLOGIC DISPOSITION:    -If he goes home today, I will take care of his narcotics  -I will send him home on MS Contin 30 mg p.o. every 12 but only for 4 doses  -I will also refill his oxycodone, but will not give him his full allotment of 60 because he has been in the hospital for several days.     Juvenal Samano MD  Please contact through [facility]

## 2022-05-03 NOTE — PROGRESS NOTES
Hospitalist Progress Note      PCP: Marcela Elkins MD    Date of Admission: 5/1/2022    Chief Complaint: chest pain       Hospital Course:    HPI: Yvonne Veras is a 24 y.o. male. He has sickle cell anemia and is well known to the ER as well as my service for frequent presentations for pain. He is also well known to [facility] where he follows up with Dr. Sunil Bernal.     He presents tonight c/o focal pleuritic right anterior chest pain since Tuesday 4/26. He denies dyspnea, cough, and hemoptysis. He was seen in the ER on 4/28 for this complaint as well as right hip pain. His evaluation on that day included a CT pulmonary angiogram which was normal.     Patient has never gotten a COVID-19 vaccine. He relates he is concerned about the side effects.     He has a h/o RLE DVT for which he was prescribed apixaban. The was an acutely occlusive DVT in the right gastrocnemius vein 10/18/2021. He stopped taking apixaban about 2 months ago because the pain in his leg resolved. Subjective:     S/p PRBCs yesterday. Pt feels better overall. Denies any new complaints.  Plans for d/c today per hemonc      Medications:  Reviewed    Infusion Medications    sodium chloride      sodium chloride       Scheduled Medications    folic acid  2 mg Oral Daily    pantoprazole  40 mg Oral QAM AC    hydroxyurea  1,000 mg Oral Daily    enoxaparin  40 mg SubCUTAneous Daily    lidocaine PF  1 mL IntraDERmal Once    ketorolac  30 mg IntraVENous TID     PRN Meds: oxyCODONE HCl, ibuprofen, sodium chloride flush, sodium chloride, potassium chloride **OR** potassium alternative oral replacement **OR** potassium chloride, magnesium sulfate, ondansetron **OR** ondansetron, acetaminophen **OR** acetaminophen, melatonin, albuterol, sodium chloride, phenylephrine      Intake/Output Summary (Last 24 hours) at 5/3/2022 1238  Last data filed at 5/2/2022 2044  Gross per 24 hour   Intake 2366.67 ml   Output --   Net 2366.67 ml Physical Exam Performed:    BP (!) 106/58   Pulse 69   Temp 98.9 °F (37.2 °C) (Oral)   Resp 16   Ht 5' 8\" (1.727 m)   Wt 191 lb 1.6 oz (86.7 kg)   SpO2 96%   BMI 29.06 kg/m²     General appearance: No apparent distress, appears stated age and cooperative. HEENT: Pupils equal, round, and reactive to light. Conjunctivae/corneas clear. Neck: Supple, with full range of motion. No jugular venous distention. Trachea midline. Respiratory:  Normal respiratory effort. Clear to auscultation, bilaterally without Rales/Wheezes/Rhonchi. Cardiovascular: Regular rate and rhythm with normal S1/S2 without murmurs, rubs or gallops. Abdomen: Soft, non-tender, non-distended with normal bowel sounds. Musculoskeletal: No clubbing, cyanosis or edema bilaterally. Skin:  Warm and dry   Neurologic:   Alert, speech clear with no overt facial droop  Psychiatric: Alert and oriented, thought content appropriate, normal insight        Labs:   Recent Labs     05/01/22 2157 05/02/22 0618 05/03/22  0504   WBC 17.3* 13.6* 10.5   HGB 7.4* 6.6* 8.7*  8.9*   HCT 21.3* 18.8* 24.6*  25.0*    375 452*     Recent Labs     05/01/22 2157 05/02/22 0618 05/03/22  0504    137 140   K 4.7 4.0 4.4    104 106   CO2 25 23 23   BUN 7 9 10   CREATININE 0.7* 0.7* 0.7*   CALCIUM 10.8* 10.3 10.7*     Recent Labs     05/01/22 2157 05/02/22 0618 05/03/22  0504   AST 27 22 23   ALT 10 9* 11   BILIDIR 0.6*  --   --    BILITOT 5.3* 4.6* 3.5*   ALKPHOS 90 77 85     No results for input(s): INR in the last 72 hours. Recent Labs     05/01/22 2157   TROPONINI <0.01       Urinalysis:      Lab Results   Component Value Date    NITRU Negative 03/04/2022    WBCUA 3-5 03/04/2022    BACTERIA 1+ 03/04/2022    RBCUA 0-2 03/04/2022    BLOODU TRACE-INTACT 03/04/2022    SPECGRAV 1.015 03/04/2022    GLUCOSEU Negative 03/04/2022       Radiology:  CT CHEST PULMONARY EMBOLISM W CONTRAST   Final Result   1. No evidence of pulmonary embolism   2. Patchy airspace disease present within the lung bases, right greater than   left. Differential considerations would include atelectasis versus early   pneumonia   3. Trace bilateral pleural effusions   4. Bilateral gynecomastia   5. Cardiomegaly         XR CHEST (2 VW)   Final Result   No acute cardiopulmonary disease. Assessment/Plan:    Active Hospital Problems    Diagnosis     Sickle cell crisis ([facility] [address] [D57.00]      Priority: High    Sickle cell pain crisis ([facility] [address] [D57.00]      Priority: High    Chronic prescription opiate use [Z79.891]     Sickle cell anemia (HCC) [D57.1]        Sickle cell crisis: improving with suppotive care. Pt non compliant wit his chronic meds. Hemonc managing. S/p PRBCs with improvement in his anemia. Continue pain control per hemonc. Ok to d/c IVF as discharging today. Pt advised on med compliance. Rt sided chest pain : likely due to above. Acute chest syndrome r/o per hemonc. CT chest neg for acute PE. Patchy ASD likely due to atelectasis with no clinical signs of pneumonia. Improved with suppotive care for sickle cell crisis. Leucocytosis : likely reactive . Pt afebrile. Resolved     Hx of DVT: Continue eliquis at d/c    DVT Prophylaxis: lovenox   Diet: ADULT DIET;  Regular  Code Status: Full Code      Dispo - pt being discharged today         Radha Nobles MD

## 2022-05-09 ENCOUNTER — HOSPITAL ENCOUNTER (EMERGENCY)
Age: 22
Discharge: HOME | End: 2022-05-09
Attending: EMERGENCY MEDICINE
Payer: COMMERCIAL

## 2022-05-09 ENCOUNTER — APPOINTMENT (OUTPATIENT)
Dept: GENERAL RADIOLOGY | Age: 22
End: 2022-05-09
Payer: COMMERCIAL

## 2022-05-09 VITALS
HEART RATE: 71 BPM | WEIGHT: 190 LBS | RESPIRATION RATE: 14 BRPM | TEMPERATURE: 98.2 F | SYSTOLIC BLOOD PRESSURE: 127 MMHG | BODY MASS INDEX: 28.79 KG/M2 | HEIGHT: 68 IN | DIASTOLIC BLOOD PRESSURE: 66 MMHG | OXYGEN SATURATION: 100 %

## 2022-05-09 DIAGNOSIS — D72.829 LEUKOCYTOSIS, UNSPECIFIED TYPE: ICD-10-CM

## 2022-05-09 DIAGNOSIS — D57.00 SICKLE CELL DISEASE WITH CRISIS (HCC): ICD-10-CM

## 2022-05-09 DIAGNOSIS — N48.30 PRIAPISM: Primary | ICD-10-CM

## 2022-05-09 LAB
ABO/RH: NORMAL
ANION GAP SERPL CALCULATED.3IONS-SCNC: 12 MMOL/L (ref 3–16)
ANTIBODY SCREEN: NORMAL
BASOPHILS ABSOLUTE: 0.3 K/UL (ref 0–0.2)
BASOPHILS RELATIVE PERCENT: 1.3 %
BUN BLDV-MCNC: 10 MG/DL (ref 7–20)
CALCIUM SERPL-MCNC: 9.5 MG/DL (ref 8.3–10.6)
CHLORIDE BLD-SCNC: 104 MMOL/L (ref 99–110)
CO2: 24 MMOL/L (ref 21–32)
CREAT SERPL-MCNC: 0.7 MG/DL (ref 0.9–1.3)
EOSINOPHILS ABSOLUTE: 0.3 K/UL (ref 0–0.6)
EOSINOPHILS RELATIVE PERCENT: 1.2 %
GFR AFRICAN AMERICAN: >60
GFR NON-AFRICAN AMERICAN: >60
GLUCOSE BLD-MCNC: 114 MG/DL (ref 70–99)
HCT VFR BLD CALC: 24 % (ref 40.5–52.5)
HEMATOLOGY PATH CONSULT: NO
HEMOGLOBIN: 8.2 G/DL (ref 13.5–17.5)
IMMATURE RETIC FRACT: 0.66 (ref 0.21–0.37)
LYMPHOCYTES ABSOLUTE: 4 K/UL (ref 1–5.1)
LYMPHOCYTES RELATIVE PERCENT: 19.4 %
MCH RBC QN AUTO: 31.1 PG (ref 26–34)
MCHC RBC AUTO-ENTMCNC: 34.1 G/DL (ref 31–36)
MCV RBC AUTO: 91.1 FL (ref 80–100)
MONOCYTES ABSOLUTE: 2.1 K/UL (ref 0–1.3)
MONOCYTES RELATIVE PERCENT: 10.2 %
NEUTROPHILS ABSOLUTE: 13.8 K/UL (ref 1.7–7.7)
NEUTROPHILS RELATIVE PERCENT: 67.9 %
PDW BLD-RTO: 22.1 % (ref 12.4–15.4)
PLATELET # BLD: 591 K/UL (ref 135–450)
PLATELET SLIDE REVIEW: ABNORMAL
PMV BLD AUTO: 8.7 FL (ref 5–10.5)
POTASSIUM REFLEX MAGNESIUM: 3.9 MMOL/L (ref 3.5–5.1)
RBC # BLD: 2.63 M/UL (ref 4.2–5.9)
RETICULOCYTE ABSOLUTE COUNT: 0.23 M/UL
RETICULOCYTE COUNT PCT: 8.58 % (ref 0.5–2.18)
SLIDE REVIEW: ABNORMAL
SODIUM BLD-SCNC: 140 MMOL/L (ref 136–145)
WBC # BLD: 20.3 K/UL (ref 4–11)

## 2022-05-09 PROCEDURE — 85025 COMPLETE CBC W/AUTO DIFF WBC: CPT

## 2022-05-09 PROCEDURE — 86900 BLOOD TYPING SEROLOGIC ABO: CPT

## 2022-05-09 PROCEDURE — 96375 TX/PRO/DX INJ NEW DRUG ADDON: CPT

## 2022-05-09 PROCEDURE — 86901 BLOOD TYPING SEROLOGIC RH(D): CPT

## 2022-05-09 PROCEDURE — 99284 EMERGENCY DEPT VISIT MOD MDM: CPT

## 2022-05-09 PROCEDURE — 96374 THER/PROPH/DIAG INJ IV PUSH: CPT

## 2022-05-09 PROCEDURE — 6360000002 HC RX W HCPCS

## 2022-05-09 PROCEDURE — 71045 X-RAY EXAM CHEST 1 VIEW: CPT

## 2022-05-09 PROCEDURE — 80048 BASIC METABOLIC PNL TOTAL CA: CPT

## 2022-05-09 PROCEDURE — 6360000002 HC RX W HCPCS: Performed by: STUDENT IN AN ORGANIZED HEALTH CARE EDUCATION/TRAINING PROGRAM

## 2022-05-09 PROCEDURE — 96376 TX/PRO/DX INJ SAME DRUG ADON: CPT

## 2022-05-09 PROCEDURE — 86850 RBC ANTIBODY SCREEN: CPT

## 2022-05-09 PROCEDURE — 85045 AUTOMATED RETICULOCYTE COUNT: CPT

## 2022-05-09 PROCEDURE — 6360000002 HC RX W HCPCS: Performed by: EMERGENCY MEDICINE

## 2022-05-09 RX ORDER — FENTANYL CITRATE 50 UG/ML
50 INJECTION, SOLUTION INTRAMUSCULAR; INTRAVENOUS ONCE
Status: COMPLETED | OUTPATIENT
Start: 2022-05-09 | End: 2022-05-09

## 2022-05-09 RX ORDER — 0.9 % SODIUM CHLORIDE 0.9 %
1000 INTRAVENOUS SOLUTION INTRAVENOUS ONCE
Status: DISCONTINUED | OUTPATIENT
Start: 2022-05-09 | End: 2022-05-09 | Stop reason: HOSPADM

## 2022-05-09 RX ADMIN — HYDROMORPHONE HYDROCHLORIDE 0.5 MG: 1 INJECTION, SOLUTION INTRAMUSCULAR; INTRAVENOUS; SUBCUTANEOUS at 06:32

## 2022-05-09 RX ADMIN — Medication 100 MCG: at 07:04

## 2022-05-09 RX ADMIN — HYDROMORPHONE HYDROCHLORIDE 1 MG: 1 INJECTION, SOLUTION INTRAMUSCULAR; INTRAVENOUS; SUBCUTANEOUS at 07:11

## 2022-05-09 RX ADMIN — FENTANYL CITRATE 50 MCG: 50 INJECTION, SOLUTION INTRAMUSCULAR; INTRAVENOUS at 08:38

## 2022-05-09 RX ADMIN — Medication 1 MG: at 07:11

## 2022-05-09 ASSESSMENT — ENCOUNTER SYMPTOMS
SHORTNESS OF BREATH: 0
WHEEZING: 0
DIARRHEA: 0
BACK PAIN: 0
NAUSEA: 0
RHINORRHEA: 0
ABDOMINAL PAIN: 0
COUGH: 0
VOMITING: 0
PHOTOPHOBIA: 0

## 2022-05-09 ASSESSMENT — PAIN SCALES - GENERAL
PAINLEVEL_OUTOF10: 10

## 2022-05-09 ASSESSMENT — PAIN - FUNCTIONAL ASSESSMENT: PAIN_FUNCTIONAL_ASSESSMENT: 0-10

## 2022-05-09 ASSESSMENT — PAIN DESCRIPTION - LOCATION
LOCATION: PENIS

## 2022-05-09 NOTE — ED PROVIDER NOTES
8:34 AM: I discussed the history, physical examination, laboratory and imaging studies, and treatment plan with Dr. Luís Doan. Viola Sarina was signed out to me in stable condition. Please see Dr. Carlos Lerma documentation for details of their history, physical, and laboratory studies. Upon re-examination, a summary of Javier aJved's history, physical examination, and studies are as follows: Patient is a 79-year-old male, presenting with concerns for priapism. The time of signout, urology consultation/evaluation was pending. Patient had received phenylephrine injections here in the department, no aspiration. Labs Reviewed   CBC WITH AUTO DIFFERENTIAL - Abnormal; Notable for the following components:       Result Value    WBC 20.3 (*)     RBC 2.63 (*)     Hemoglobin 8.2 (*)     Hematocrit 24.0 (*)     RDW 22.1 (*)     Platelets 680 (*)     Neutrophils Absolute 13.8 (*)     Monocytes Absolute 2.1 (*)     Basophils Absolute 0.3 (*)     All other components within normal limits   BASIC METABOLIC PANEL W/ REFLEX TO MG FOR LOW K - Abnormal; Notable for the following components:    Glucose 114 (*)     CREATININE 0.7 (*)     All other components within normal limits   RETICULOCYTES - Abnormal; Notable for the following components:    Retic Ct Pct 8.58 (*)     Immature Retic Fract 0.66 (*)     All other components within normal limits   TYPE AND SCREEN     XR CHEST PORTABLE   Final Result   Stable borderline enlargement of the cardiopericardial silhouette may   represent cardiac decompensation or a pericardial effusion           Patient was assessed by urology here in the department, his priapism has resolved. Patient states that he is feeling better. He was counseled on need for follow-up with Dr. Luna Kelsey. Chest x-ray showed stable borderline enlargement of the cardiopericardial silhouette. No evidence of pneumonia or infectious process.   Patient is feeling well and is comfortable in agreement with plan of care for discharge. Given return precautions. Advise follow-up with urology and heme-onc. 1. Priapism    2.  Sickle cell disease with crisis ([facility] [address]    3. Leukocytosis, unspecified type           Brigido Hunt MD  05/09/22 [id number]

## 2022-05-09 NOTE — ED NOTES
0530- PS Urology  Per   RE sickle cell, priapism   returned page @[phone]       Sanaz Jordan  05/09/22 0677

## 2022-05-09 NOTE — ED NOTES
RN assisted Dr. Aaron Guerra with penile block procedure. Lidocaine 1% given per MD at 0658. 100mg phenylephrine at 0704. Wrapped with gauze. Dilaudid administered with verbal order with readback at 0708. Patient reports that he has required this in the past and was suggested to massage the area. Call light within reach.      Valery Miguel RN  05/09/22 [id number]

## 2022-05-09 NOTE — LETTER
[facility]  [address] [address]  Phone: [phone]  Fax: [phone]               May 9, 2022    Patient: [facility]   YOB: 2000   Date of Visit: 5/9/2022       To Whom It May Concern:    [facility] was seen and treated in our emergency department on 5/9/2022. He may return on 5/11/22.       Sincerely,       Aditya Burroughs RN         Signature:__________________________________

## 2022-05-09 NOTE — CONSULTS
Urology Attending Consult Note      Reason for Consultation: priapism    History: 23 y/o male has a h/o priapism and siclle cell disease. He had erection over night and penile pain. He had phenylephrine injected in the penis and now the penis is not erect and his pain is better. Family History, Social History, Review of Systems:  Reviewed and agreed to as per chart    Vitals:  /66   Pulse 71   Temp 98.2 °F (36.8 °C) (Oral)   Resp 14   Ht 5' 8\" (1.727 m)   Wt 190 lb (86.2 kg)   SpO2 100%   BMI 28.89 kg/m²   Temp  Av.2 °F (36.8 °C)  Min: 98.2 °F (36.8 °C)  Max: 98.2 °F (36.8 °C)  No intake or output data in the 24 hours ending 22 0846      Physical:   Well developed, well nourished in no acute distress   Mood indicates no abnormalities. Pt doesnt appear depressed   Orientated to time and place   Neck is supple, trachea is midline   Respiratory effort is normal   Cardiovascular show no extremity swelling   Abdomen no masses or hernias are palpated, there is no tenderness. Liver and Spleen appear normal.   Skin show no abnormal lesions   Lymph nodes are not palpated in the inguinal, neck, or axillary area.      Male :   Penis appears normal and circumcised, not erect   Urethral meatus is normal in size and location   Scrotum appears normal and both testicles appear normal in size and location    Labs:  WBC:    Lab Results   Component Value Date    WBC 20.3 2022     Hemoglobin/Hematocrit:    Lab Results   Component Value Date    HGB 8.2 2022    HCT 24.0 2022     BMP:    Lab Results   Component Value Date     2022    K 3.9 2022     2022    CO2 24 2022    BUN 10 2022    LABALBU 4.3 2022    CREATININE 0.7 2022    CALCIUM 9.5 2022    GFRAA >60 2022    LABGLOM >60 2022     PT/INR:    Lab Results   Component Value Date    PROTIME 14.4 2021    INR 1.2021     PTT:    Lab Results Component Value Date    APTT 43.5 10/19/2021   [APTT    Impression/Plan: priapism  1. Penis no longer erect due to phenylephrine  2.  Card given for pt to f/u with Dr Lenny Gayle MD

## 2022-05-09 NOTE — ED PROVIDER NOTES
Emergency Department Provider Note  Location: [address] [place] [address]  ED  5/9/2022     Patient Identification  Radha Gaxiola is a 24 y.o. male    Chief Complaint  Penis Pain (pt state he woke up with a priaprism)          HPI  (History provided by patient)  Patient is a 41-year-old male history of sickle cell anemia with poor compliance and poor follow-up who presents with penile pain consistent with priapism. Patient reports that he woke up at 3 AM with an achy sensation with full erection. Urinated successfully but still having significant pain. Constant achy pain severe no exacerbating or alleviating factors. He reports that this is happened [place] lot lately\". He has been putting ice packs when this happens at which usually resolves his symptoms. 3 months ago patient presented to the emergency department with similar picture and required intervention by urology. Patient denies any sexual activity denies any Viagra or any other new medications. I have reviewed the following nursing documentation:  Allergies: Allergies   Allergen Reactions    Morphine Shortness Of Breath     Other reaction(s): Histamine-like Reaction  Has asthma exacerbation with morphine-histamine type reaction         Past medical history:  has a past medical history of Accidental overdose, Asthma, DVT (deep venous thrombosis) ([facility] [address] (10/19/2021), Enlarged heart, 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 ([facility] [address] and Sickle cell anemia ([facility] [address] Past surgical history:  has a past surgical history that includes Splenectomy. Home medications:   Prior to Admission medications    Medication Sig Start Date End Date Taking?  Authorizing Provider   apixaban (ELIQUIS) 5 MG TABS tablet Take 1 tablet by mouth 2 times daily 12/31/21   Jaquan Coleman MD   pantoprazole (PROTONIX) 40 MG tablet Take 1 tablet by mouth every morning (before breakfast) 1/1/22   Jaquan Coleman MD   folic acid (FOLVITE) 1 MG tablet Take 2 tablets by mouth daily 9/16/21   Jeremie Elkins APRN - CNP   oxyCODONE HCl (OXY-IR) 10 MG immediate release tablet Take 10 mg by mouth every 4 hours as needed for Pain. Historical Provider, MD   albuterol sulfate HFA (PROAIR HFA) 108 (90 Base) MCG/ACT inhaler Albuterol HFA Inhaler 90 mcg/actuation  inhaled  2 puffs prn     Active    Historical Provider, MD   levalbuterol (XOPENEX) 0.31 MG/3ML nebulization Levalbuterol Nebulized    2 puffs prn     Active    Historical Provider, MD   docusate (COLACE, DULCOLAX) 100 MG CAPS Take 100 mg by mouth    Historical Provider, MD   ibuprofen (ADVIL;MOTRIN) 800 MG tablet ibuprofen 800 MG Oral Tablet  oral   prn    Active    Historical Provider, MD   hydroxyurea (HYDREA) 500 MG chemo capsule Take 1,000 mg by mouth daily  12/9/20   Katt Moses MD       Social history:  reports that he has never smoked. He has never used smokeless tobacco. He reports previous alcohol use. He reports that he does not use drugs. Family history:    Family History   Problem Relation Age of Onset    Other Father         sarcoidosis         ROS  Review of Systems   Constitutional: Negative for chills and fever. HENT: Negative for congestion and rhinorrhea. Eyes: Negative for photophobia and visual disturbance. Respiratory: Negative for cough, shortness of breath and wheezing. Cardiovascular: Negative for chest pain and palpitations. Gastrointestinal: Negative for abdominal pain, diarrhea, nausea and vomiting. Genitourinary: Positive for penile pain and penile swelling. Negative for dysuria, hematuria, scrotal swelling and testicular pain. Musculoskeletal: Negative for back pain and neck pain. Skin: Negative for rash and wound. Neurological: Negative for syncope and weakness. Psychiatric/Behavioral: Negative for agitation and confusion.          Exam  ED Triage Vitals   BP Temp Temp src Pulse Resp SpO2 Height Weight   -- -- -- -- -- -- -- --       Physical Exam  Vitals and nursing note reviewed. Constitutional:       General: He is not in acute distress. Appearance: He is well-developed. HENT:      Head: Normocephalic and atraumatic. Nose: Nose normal. No congestion. Eyes:      Extraocular Movements: Extraocular movements intact. Pupils: Pupils are equal, round, and reactive to light. Cardiovascular:      Rate and Rhythm: Normal rate and regular rhythm. Heart sounds: No murmur heard. Pulmonary:      Effort: Pulmonary effort is normal.      Breath sounds: Normal breath sounds. Abdominal:      General: There is no distension. Palpations: Abdomen is soft. Tenderness: There is no abdominal tenderness. Genitourinary:     Comments: Penis is erect. The glans is semierect. There is noes testicular pain or swelling or overlying skin changes. Musculoskeletal:         General: No deformity. Normal range of motion. Cervical back: Normal range of motion and neck supple. Skin:     General: Skin is warm. Findings: No rash. Neurological:      Mental Status: He is alert and oriented to person, place, and time. Motor: No abnormal muscle tone.       Coordination: Coordination normal.   Psychiatric:         Mood and Affect: Mood normal.         Behavior: Behavior normal.             ED Course    ED Medication Orders (From admission, onward)    Start Ordered     Status Ordering Provider    05/09/22 0715 05/09/22 0711  HYDROmorphone (DILAUDID) injection 1 mg  ONCE         Last MAR action: Given - by Eli Issa on 05/09/22 at 0711 GUMARO MATOS    05/09/22 0603 05/09/22 0604  phenylephrine (STEVE-SYNEPHRINE) 100 mcg/mL injection (FOR PRIAPISM)  EVERY 5 MIN PRN         Last MAR action: Given - by Eli Issa on 05/09/22 at 0704 GUMARO MATOS    05/09/22 0530 05/09/22 0527  0.9 % sodium chloride bolus  ONCE         Acknowledged GUMARO MATOS    05/09/22 0530 05/09/22 0527  HYDROmorphone (DILAUDID) injection 0.5 mg  ONCE Last MAR action: Given - by Delfina Christensen on 05/09/22 at 1912 [facility], [facility]            Radiology  No results found. Labs  Results for orders placed or performed during the hospital encounter of 05/09/22   CBC with Auto Differential   Result Value Ref Range    WBC 20.3 (H) 4.0 - 11.0 K/uL    RBC 2.63 (L) 4.20 - 5.90 M/uL    Hemoglobin 8.2 (L) 13.5 - 17.5 g/dL    Hematocrit 24.0 (L) 40.5 - 52.5 %    MCV 91.1 80.0 - 100.0 fL    MCH 31.1 26.0 - 34.0 pg    MCHC 34.1 31.0 - 36.0 g/dL    RDW 22.1 (H) 12.4 - 15.4 %    Platelets 709 (H) 972 - 450 K/uL    MPV 8.7 5.0 - 10.5 fL    PLATELET SLIDE REVIEW Increased     SLIDE REVIEW see below     Path Consult Yes     Neutrophils % 67.9 %    Lymphocytes % 19.4 %    Monocytes % 10.2 %    Eosinophils % 1.2 %    Basophils % 1.3 %    Neutrophils Absolute 13.8 (H) 1.7 - 7.7 K/uL    Lymphocytes Absolute 4.0 1.0 - 5.1 K/uL    Monocytes Absolute 2.1 (H) 0.0 - 1.3 K/uL    Eosinophils Absolute 0.3 0.0 - 0.6 K/uL    Basophils Absolute 0.3 (H) 0.0 - 0.2 K/uL   Basic Metabolic Panel w/ Reflex to MG   Result Value Ref Range    Sodium 140 136 - 145 mmol/L    Potassium reflex Magnesium 3.9 3.5 - 5.1 mmol/L    Chloride 104 99 - 110 mmol/L    CO2 24 21 - 32 mmol/L    Anion Gap 12 3 - 16    Glucose 114 (H) 70 - 99 mg/dL    BUN 10 7 - 20 mg/dL    CREATININE 0.7 (L) 0.9 - 1.3 mg/dL    GFR Non-African American >60 >60    GFR African American >60 >60    Calcium 9.5 8.3 - 10.6 mg/dL   Reticulocytes   Result Value Ref Range    Retic Ct Pct 8.58 (H) 0.50 - 2.18 %    Retic Ct Abs 0.226 M/uL    Immature Retic Fract 0.66 (H) 0.21 - 0.37   TYPE AND SCREEN   Result Value Ref Range    ABO/Rh B POS     Antibody Screen NEG      Procedures  Priapism care  Patient was consented signed consent form after discussion of the risks and benefits of procedure.   Patient was laid supine and area was cleaned and dorsal penile nerve block performed injecting approximately 8 cc of one-to-one mixture 1% epinephrine and 0.5% bupivacaine. Negative aspiration incremental injection. Successful block. Shaft of the penis was cleaned again and serial injections of 100 mcg of phenylephrine injected in bilateral corpus cavernosum for a total of 200 mcg in each side. Placement was confirmed by successful aspiration prior to incremental injection of the phenylephrine. Patient had partial detumescence. Patient still with significant pain. Patient overall tolerated procedure well without complications. MDM  Patient seen and evaluated. Relevant records reviewed. 21-year-old male who presents with priapism in the setting of sickle cell anemia. Patient placed on supplemental O2 and received 2 doses of IV pain medication. Patient nontoxic-appearing and vitally stable. Has penile erection which partially resolves or at least substantially improved after incremental injections of phenylephrine. Patient reporting that \"last time they did this they injected the medicine and let me massage it and that ended up resolving it. \"  I discussed aspiration of blood from the corpus cavernosum however the patient would like to continue to massage. I have placed multiple calls to urology and spoke with Dr. Alphonse Carvalho twice who states that he is planning to contact his urology colleague for further assessment and treatment here in the emergency department. His labs are resulted showing a leukocytosis of unclear significance. Patient denies chest pain initially but given leukocytosis will add on chest x-ray. Ultimately patient's care will be signed out to oncoming ED physician as I am at the end of my shift. Otherwise patient's    Clinical Impression:  1. Priapism    2. Sickle cell disease with crisis (HCC)    3. Leukocytosis, unspecified type            Blood pressure (!) 147/77, pulse 74, temperature 98.2 °F (36.8 °C), temperature source Oral, resp. rate 15, height 5' 8\" (1.727 m), weight 190 lb (86.2 kg), SpO2 98 %.     Patient was given scripts for the following medications. I counseled patient how to take these medications. New Prescriptions    No medications on file       Disposition referral (if applicable):  No follow-up provider specified. Total critical care time is 45 minutes, which excludes separately billable procedures and updating family. Time spent is specifically for management of the presenting complaint and symptoms initially, direct bedside care, reevaluation, review of records, and consultation. There was a high probability of clinically significant life-threatening deterioration in the patient's condition, which required my urgent intervention. This chart was generated in part by using Dragon Dictation system and may contain errors related to that system including errors in grammar, punctuation, and spelling, as well as words and phrases that may be inappropriate. If there are any questions or concerns please feel free to contact the dictating provider for clarification.      Bony Lind MD  [address] MD  05/09/22 0649

## 2022-05-17 ENCOUNTER — APPOINTMENT (OUTPATIENT)
Dept: GENERAL RADIOLOGY | Age: 22
End: 2022-05-17
Payer: COMMERCIAL

## 2022-05-17 ENCOUNTER — HOSPITAL ENCOUNTER (EMERGENCY)
Age: 22
Discharge: HOME | End: 2022-05-17
Attending: EMERGENCY MEDICINE
Payer: COMMERCIAL

## 2022-05-17 VITALS
HEIGHT: 68 IN | DIASTOLIC BLOOD PRESSURE: 61 MMHG | WEIGHT: 190 LBS | BODY MASS INDEX: 28.79 KG/M2 | TEMPERATURE: 98 F | HEART RATE: 65 BPM | RESPIRATION RATE: 14 BRPM | OXYGEN SATURATION: 99 % | SYSTOLIC BLOOD PRESSURE: 126 MMHG

## 2022-05-17 DIAGNOSIS — M25.561 ACUTE PAIN OF RIGHT KNEE: Primary | ICD-10-CM

## 2022-05-17 PROCEDURE — 96372 THER/PROPH/DIAG INJ SC/IM: CPT

## 2022-05-17 PROCEDURE — 73560 X-RAY EXAM OF KNEE 1 OR 2: CPT

## 2022-05-17 PROCEDURE — 99284 EMERGENCY DEPT VISIT MOD MDM: CPT

## 2022-05-17 PROCEDURE — 6360000002 HC RX W HCPCS: Performed by: EMERGENCY MEDICINE

## 2022-05-17 RX ORDER — KETOROLAC TROMETHAMINE 10 MG/1
TABLET, FILM COATED ORAL
Status: ON HOLD | COMMUNITY
Start: 2022-05-01 | End: 2022-08-29 | Stop reason: HOSPADM

## 2022-05-17 RX ORDER — KETOROLAC TROMETHAMINE 30 MG/ML
15 INJECTION, SOLUTION INTRAMUSCULAR; INTRAVENOUS ONCE
Status: COMPLETED | OUTPATIENT
Start: 2022-05-17 | End: 2022-05-17

## 2022-05-17 RX ORDER — MORPHINE SULFATE 15 MG/1
TABLET, FILM COATED, EXTENDED RELEASE ORAL
Status: ON HOLD | COMMUNITY
Start: 2022-05-03 | End: 2022-10-24 | Stop reason: HOSPADM

## 2022-05-17 RX ORDER — DULOXETIN HYDROCHLORIDE 30 MG/1
CAPSULE, DELAYED RELEASE ORAL
COMMUNITY
Start: 2022-05-10

## 2022-05-17 RX ADMIN — KETOROLAC TROMETHAMINE 15 MG: 30 INJECTION, SOLUTION INTRAMUSCULAR at 03:41

## 2022-05-17 ASSESSMENT — PAIN DESCRIPTION - ORIENTATION: ORIENTATION: RIGHT

## 2022-05-17 ASSESSMENT — PAIN SCALES - GENERAL
PAINLEVEL_OUTOF10: 7
PAINLEVEL_OUTOF10: 7

## 2022-05-17 ASSESSMENT — PAIN DESCRIPTION - DESCRIPTORS: DESCRIPTORS: ACHING

## 2022-05-17 ASSESSMENT — PAIN - FUNCTIONAL ASSESSMENT: PAIN_FUNCTIONAL_ASSESSMENT: 0-10

## 2022-05-17 ASSESSMENT — PAIN DESCRIPTION - LOCATION: LOCATION: KNEE

## 2022-05-17 NOTE — ED NOTES
Pt arrived via squad. C/O right knee pain that \"started last week\". Pt denies any injury. Mild edema noted to knee. Popliteal pulse strong. No redness or warms noted to area.           Terrence Ahn RN  05/17/22 9816

## 2022-05-17 NOTE — ED NOTES
Pt resting on cot in position of comfort. Respirations regular. Airway intact. GCS 15. Pt holding conversation with this RN w/o difficulty.  0 s/s of distress. Awaiting further orders. Will continue to monitor.        Mikhail Soliz RN  05/17/22 0719

## 2022-05-17 NOTE — ED PROVIDER NOTES
[address] [facility]  ED  [facility]      Pt Name: Viola Branch  MRN: [id number]  [place] 2000  Date of evaluation: 5/17/2022  Provider: Latonia Serrano MD    [address] [place] [address]       Chief Complaint   Patient presents with    Joint Swelling     right knee pain for a week, no injury         HISTORY OF PRESENT ILLNESS   (Location/Symptom, Timing/Onset,Context/Setting, Quality, Duration, Modifying Factors, Severity)  Note limiting factors. Viola Branch is a 24 y.o. male who presents to the emergency department for right knee pain and swelling started last week Tuesday. No redness, no warmth. Saw his doctor on Tuesday. Was told by doctor to take his pain meds. The pain did not improve so he was told to come to the ED. Nursing notes were reviewed.     REVIEW OF SYSTEMS    (2-9 systems for level 4, 10 or more for level 5)     Review of Systems      PAST MEDICAL HISTORY     Past Medical History:   Diagnosis Date    Accidental overdose     Asthma     DVT (deep venous thrombosis) (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)     Sickle cell anemia (HCC)          SURGICALHISTORY       Past Surgical History:   Procedure Laterality Date    SPLENECTOMY           CURRENT MEDICATIONS       Discharge Medication List as of 5/17/2022  4:13 AM      CONTINUE these medications which have NOT CHANGED    Details   morphine (MS CONTIN) 15 MG extended release tablet Historical Med      ketorolac (TORADOL) 10 MG tablet Historical Med      DULoxetine (CYMBALTA) 30 MG extended release capsule Historical Med      apixaban (ELIQUIS) 5 MG TABS tablet Take 1 tablet by mouth 2 times daily, Disp-60 tablet, R-0Normal      pantoprazole (PROTONIX) 40 MG tablet Take 1 tablet by mouth every morning (before breakfast), Disp-30 tablet, [id number]      folic acid (FOLVITE) 1 MG tablet Take 2 tablets by mouth daily, Disp-30 tablet, R-3Normal      oxyCODONE HCl (OXY-IR) 10 MG immediate release tablet Take 10 mg by mouth every 4 hours as needed for Pain. Historical Med      albuterol sulfate HFA (PROAIR HFA) 108 (90 Base) MCG/ACT inhaler Albuterol HFA Inhaler 90 mcg/actuation  inhaled  2 puffs prn     ActiveHistorical Med      levalbuterol (XOPENEX) 0.31 MG/3ML nebulization Levalbuterol Nebulized    2 puffs prn     ActiveHistorical Med      docusate (COLACE, DULCOLAX) 100 MG CAPS Take 100 mg by mouthHistorical Med      ibuprofen (ADVIL;MOTRIN) 800 MG tablet ibuprofen 800 MG Oral Tablet  oral   prn    ActiveHistorical Med      hydroxyurea (HYDREA) 500 MG chemo capsule Take 1,000 mg by mouth daily Historical Med             ALLERGIES     Morphine    FAMILY HISTORY       Family History   Problem Relation Age of Onset    Other Father         sarcoidosis          SOCIAL HISTORY       Social History     Socioeconomic History    Marital status: Single     Spouse name: None    Number of children: 0    Years of education: None    Highest education level: None   Occupational History    None   Tobacco Use    Smoking status: Never Smoker    Smokeless tobacco: Never Used   Vaping Use    Vaping Use: Never used   Substance and Sexual Activity    Alcohol use: Not Currently    Drug use: Never    Sexual activity: Not Currently   Other Topics Concern    None   Social History Narrative    None     Social Determinants of Health     Financial Resource Strain:     Difficulty of Paying Living Expenses: Not on file   Food Insecurity:     Worried About Running Out of Food in the Last Year: Not on file    Laura of Food in the Last Year: Not on file   Transportation Needs:     Lack of Transportation (Medical): Not on file    Lack of Transportation (Non-Medical):  Not on file   Physical Activity:     Days of Exercise per Week: Not on file    Minutes of Exercise per Session: Not on file   Stress:     Feeling of Stress : Not on file   Social Connections:     Frequency of Communication with Friends and Family: Not on file    Frequency of Social Gatherings with Friends and Family: Not on file    Attends [employer] Services: Not on file    Active Member of Clubs or Organizations: Not on file    Attends Club or Organization Meetings: Not on file    Marital Status: Not on file   Intimate Partner Violence:     Fear of Current or Ex-Partner: Not on file    Emotionally Abused: Not on file    Physically Abused: Not on file    Sexually Abused: Not on file   Housing Stability:     Unable to Pay for Housing in the Last Year: Not on file    Number of [place] in the Last Year: Not on file    Unstable Housing in the Last Year: Not on file       SCREENINGS    [place] Coma Scale  Eye Opening: Spontaneous  Best Verbal Response: Oriented  Best Motor Response: Obeys commands  [place] Coma Scale Score: 15        PHYSICAL EXAM    (up to 7 for level 4, 8 or more for level 5)     ED Triage Vitals [05/17/22 0258]   BP Temp Temp Source Pulse Resp SpO2 Height Weight   126/61 98 °F (36.7 °C) Temporal 65 14 99 % 5' 8\" (1.727 m) 190 lb (86.2 kg)       Physical Exam  Vitals and nursing note reviewed. Constitutional:       Appearance: Normal appearance. He is well-developed. He is not ill-appearing. HENT:      Head: Normocephalic and atraumatic. Right Ear: External ear normal.      Left Ear: External ear normal.      Nose: Nose normal.   Eyes:      General: No scleral icterus. Right eye: No discharge. Left eye: No discharge. Conjunctiva/sclera: Conjunctivae normal.   Cardiovascular:      Rate and Rhythm: Normal rate. Pulses: Normal pulses. Pulmonary:      Effort: Pulmonary effort is normal. No respiratory distress. Musculoskeletal:         General: Tenderness present. No swelling. Cervical back: Neck supple. Comments: Right knee tenderness in the medial aspect of the joint, no effusion, the knee is stable. Normal range of motion.   Patient is able to weight-bear. Skin:     Coloration: Skin is not pale. Findings: No erythema. Comments: Not excessively warm to touch   Neurological:      Mental Status: He is alert. Psychiatric:         Mood and Affect: Mood normal.         Behavior: Behavior normal.           DIAGNOSTIC RESULTS     EKG: All EKG's are interpreted by the Emergency Department Physician who either signs or Co-signs this chart in the absence of a cardiologist.    12 lead EKG shows     RADIOLOGY:   Non-plain film images such as CT, Ultrasound and MRI are read by the radiologist. Plain radiographic images are visualized and preliminarily interpreted by the emergency physician with the below findings:        Interpretation per the Radiologist below, if available at the time of this note:    XR KNEE RIGHT (1-2 VIEWS)   Final Result   Unremarkable knee. ED BEDSIDE ULTRASOUND:   Performed by ED Physician - none    LABS:  Labs Reviewed - No data to display    All other labs were within normal range or not returned as of this dictation. EMERGENCY DEPARTMENT COURSE and DIFFERENTIAL DIAGNOSIS/MDM:   Vitals:    Vitals:    05/17/22 0258   BP: 126/61   Pulse: 65   Resp: 14   Temp: 98 °F (36.7 °C)   TempSrc: Temporal   SpO2: 99%   Weight: 190 lb (86.2 kg)   Height: 5' 8\" (1.727 m)       Adult male who comes in if knee pain. Based on history and physical exam little suspicion for serious life-threatening condition including septic arthritis. X-ray ordered no acute traumatic findings. Patient given Toradol for his pain. Otherwise patient will be discharged I encourage follow-up in the outpatient setting. CRITICAL CARE TIME   None       CONSULTS:  None    PROCEDURES:       Procedures    FINAL IMPRESSION      1.  Acute pain of right knee          DISPOSITION/PLAN   DISPOSITION Decision To Discharge 05/17/2022 04:02:01 AM      PATIENT REFERREDTO:  Kathleen Barrios MD  [address] [place] [id number]  [phone]    Schedule an appointment as soon as possible for a visit         DISCHARGEMEDICATIONS:  Discharge Medication List as of 5/17/2022  4:13 AM             (Please note that portions of this note were completed with a voice recognition program.  Efforts were made to edit the dictations but occasionally words are mis-transcribed.)    Rosalia Xiao MD (electronically signed)  Attending Emergency Physician          Rosalia Xiao MD  05/17/22 7796

## 2022-05-17 NOTE — ED NOTES
Pt given ED d/c instructions. Pt would not sign but did verbally acknowledge that they given to him. Pt voiced his displeasure that additional pain medication was not given to him this visit or that it was prescribed additional pain medication by MD at d/c. Pt ambulated to [employer] for a ride home with a strong steady gate.  0 s/s of distress.       Radha Ruano RN  05/17/22 9455

## 2022-05-24 ENCOUNTER — HOSPITAL ENCOUNTER (EMERGENCY)
Age: 22
Discharge: HOME | End: 2022-05-24
Attending: EMERGENCY MEDICINE
Payer: COMMERCIAL

## 2022-05-24 VITALS
HEART RATE: 71 BPM | HEIGHT: 68 IN | SYSTOLIC BLOOD PRESSURE: 119 MMHG | TEMPERATURE: 98.3 F | OXYGEN SATURATION: 93 % | BODY MASS INDEX: 28.79 KG/M2 | DIASTOLIC BLOOD PRESSURE: 65 MMHG | RESPIRATION RATE: 16 BRPM | WEIGHT: 190 LBS

## 2022-05-24 DIAGNOSIS — N48.32 PRIAPISM DUE TO SICKLE CELL DISEASE (HCC): Primary | ICD-10-CM

## 2022-05-24 DIAGNOSIS — D57.1 PRIAPISM DUE TO SICKLE CELL DISEASE (HCC): Primary | ICD-10-CM

## 2022-05-24 LAB
A/G RATIO: 2 (ref 1.1–2.2)
ALBUMIN SERPL-MCNC: 4.6 G/DL (ref 3.4–5)
ALP BLD-CCNC: 85 U/L (ref 40–129)
ALT SERPL-CCNC: 10 U/L (ref 10–40)
ANION GAP SERPL CALCULATED.3IONS-SCNC: 9 MMOL/L (ref 3–16)
AST SERPL-CCNC: 23 U/L (ref 15–37)
BASOPHILS ABSOLUTE: 0.1 K/UL (ref 0–0.2)
BASOPHILS RELATIVE PERCENT: 0.7 %
BILIRUB SERPL-MCNC: 5.1 MG/DL (ref 0–1)
BUN BLDV-MCNC: 7 MG/DL (ref 7–20)
CALCIUM SERPL-MCNC: 8.9 MG/DL (ref 8.3–10.6)
CHLORIDE BLD-SCNC: 108 MMOL/L (ref 99–110)
CO2: 24 MMOL/L (ref 21–32)
CREAT SERPL-MCNC: 0.8 MG/DL (ref 0.9–1.3)
EOSINOPHILS ABSOLUTE: 0.2 K/UL (ref 0–0.6)
EOSINOPHILS RELATIVE PERCENT: 1.2 %
GFR AFRICAN AMERICAN: >60
GFR NON-AFRICAN AMERICAN: >60
GLUCOSE BLD-MCNC: 144 MG/DL (ref 70–99)
HCT VFR BLD CALC: 21.4 % (ref 40.5–52.5)
HEMOGLOBIN: 7.6 G/DL (ref 13.5–17.5)
IMMATURE RETIC FRACT: 0.74 (ref 0.21–0.37)
LYMPHOCYTES ABSOLUTE: 2.7 K/UL (ref 1–5.1)
LYMPHOCYTES RELATIVE PERCENT: 19.8 %
MAGNESIUM: 1.9 MG/DL (ref 1.8–2.4)
MCH RBC QN AUTO: 33 PG (ref 26–34)
MCHC RBC AUTO-ENTMCNC: 35.5 G/DL (ref 31–36)
MCV RBC AUTO: 93 FL (ref 80–100)
MONOCYTES ABSOLUTE: 1.1 K/UL (ref 0–1.3)
MONOCYTES RELATIVE PERCENT: 7.8 %
NEUTROPHILS ABSOLUTE: 9.6 K/UL (ref 1.7–7.7)
NEUTROPHILS RELATIVE PERCENT: 70.5 %
PDW BLD-RTO: 24.5 % (ref 12.4–15.4)
PLATELET # BLD: 508 K/UL (ref 135–450)
PMV BLD AUTO: 7.4 FL (ref 5–10.5)
POTASSIUM REFLEX MAGNESIUM: 3.4 MMOL/L (ref 3.5–5.1)
RBC # BLD: 2.3 M/UL (ref 4.2–5.9)
RETICULOCYTE ABSOLUTE COUNT: 0.25 M/UL
RETICULOCYTE COUNT PCT: 10.55 % (ref 0.5–2.18)
SODIUM BLD-SCNC: 141 MMOL/L (ref 136–145)
SPECIMEN STATUS: NORMAL
TOTAL PROTEIN: 6.9 G/DL (ref 6.4–8.2)
WBC # BLD: 13.6 K/UL (ref 4–11)

## 2022-05-24 PROCEDURE — 85025 COMPLETE CBC W/AUTO DIFF WBC: CPT

## 2022-05-24 PROCEDURE — 85045 AUTOMATED RETICULOCYTE COUNT: CPT

## 2022-05-24 PROCEDURE — 96374 THER/PROPH/DIAG INJ IV PUSH: CPT

## 2022-05-24 PROCEDURE — 2580000003 HC RX 258: Performed by: EMERGENCY MEDICINE

## 2022-05-24 PROCEDURE — 96376 TX/PRO/DX INJ SAME DRUG ADON: CPT

## 2022-05-24 PROCEDURE — 54220 IRRG CRPRA CAVRNOSA PRIAPISM: CPT

## 2022-05-24 PROCEDURE — 96375 TX/PRO/DX INJ NEW DRUG ADDON: CPT

## 2022-05-24 PROCEDURE — 83735 ASSAY OF MAGNESIUM: CPT

## 2022-05-24 PROCEDURE — 6360000002 HC RX W HCPCS: Performed by: EMERGENCY MEDICINE

## 2022-05-24 PROCEDURE — 99284 EMERGENCY DEPT VISIT MOD MDM: CPT

## 2022-05-24 PROCEDURE — 80053 COMPREHEN METABOLIC PANEL: CPT

## 2022-05-24 RX ORDER — ONDANSETRON 2 MG/ML
4 INJECTION INTRAMUSCULAR; INTRAVENOUS ONCE
Status: COMPLETED | OUTPATIENT
Start: 2022-05-24 | End: 2022-05-24

## 2022-05-24 RX ORDER — 0.9 % SODIUM CHLORIDE 0.9 %
1000 INTRAVENOUS SOLUTION INTRAVENOUS ONCE
Status: COMPLETED | OUTPATIENT
Start: 2022-05-24 | End: 2022-05-24

## 2022-05-24 RX ADMIN — SODIUM CHLORIDE 1000 ML: 9 INJECTION, SOLUTION INTRAVENOUS at 05:44

## 2022-05-24 RX ADMIN — HYDROMORPHONE HYDROCHLORIDE 1 MG: 1 INJECTION, SOLUTION INTRAMUSCULAR; INTRAVENOUS; SUBCUTANEOUS at 06:40

## 2022-05-24 RX ADMIN — HYDROMORPHONE HYDROCHLORIDE 1 MG: 1 INJECTION, SOLUTION INTRAMUSCULAR; INTRAVENOUS; SUBCUTANEOUS at 05:46

## 2022-05-24 RX ADMIN — ONDANSETRON 4 MG: 2 INJECTION INTRAMUSCULAR; INTRAVENOUS at 05:46

## 2022-05-24 RX ADMIN — Medication 100 MCG: at 05:50

## 2022-05-24 ASSESSMENT — PAIN SCALES - GENERAL
PAINLEVEL_OUTOF10: 10
PAINLEVEL_OUTOF10: 10
PAINLEVEL_OUTOF10: 8
PAINLEVEL_OUTOF10: 5

## 2022-05-24 ASSESSMENT — PAIN - FUNCTIONAL ASSESSMENT: PAIN_FUNCTIONAL_ASSESSMENT: 0-10

## 2022-05-24 ASSESSMENT — PAIN DESCRIPTION - LOCATION
LOCATION: GROIN
LOCATION: PENIS

## 2022-05-24 ASSESSMENT — PAIN DESCRIPTION - PAIN TYPE: TYPE: ACUTE PAIN

## 2022-05-24 ASSESSMENT — PAIN DESCRIPTION - FREQUENCY: FREQUENCY: CONTINUOUS

## 2022-05-24 NOTE — ED NOTES
Pt requested beverages - given OJ and rebecca mist. Pt resting comfortably      Sergio Adam RN  05/24/22 4881

## 2022-05-24 NOTE — ED NOTES
Mikhail Jason RN  [facility] Johan Mart[facility]nez RN  05/24/22 5586

## 2022-05-24 NOTE — ED PROVIDER NOTES
[facility] Emergency Department      CHIEF COMPLAINT  Groin Pain (started at 3am this morning, Pt states, \"Its priapism\" when asked whatwas hurting.)      HISTORY OF 1951 Ni Parra is a 24 y.o. male with a history of sickle cell disease who presents with priapism. This is a recurrent issue for this patient. He has not followed up with urology as an outpatient. He states for the past 3 days he has woken up with an erection. He is able to apply ice packs and will go away but today it will not. He is having severe pain. He was able to urinate at home. He denies any fevers or cough. No shortness of breath. He is followed by Dr. Lola Banegas with hematology. .   No other complaints, modifying factors or associated symptoms. I have reviewed the following from the nursing documentation.     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 ([facility])     Sickle cell anemia ([facility])      Past Surgical History:   Procedure Laterality Date    SPLENECTOMY       Family History   Problem Relation Age of Onset    Other Father         sarcoidosis     Social History     Socioeconomic History    Marital status: Single     Spouse name: Not on file    Number of children: 0    Years of education: Not on file    Highest education level: Not on file   Occupational History    Not on file   Tobacco Use    Smoking status: Never Smoker    Smokeless tobacco: Never Used   Vaping Use    Vaping Use: Never used   Substance and Sexual Activity    Alcohol use: Not Currently    Drug use: Never    Sexual activity: Not Currently   Other Topics Concern    Not on file   Social History Narrative    Not on file     Social Determinants of Health     Financial Resource Strain:     Difficulty of Paying Living Expenses: Not on file   Food Insecurity:     Worried About Running Out of Food in the Last Year: Not on file    Ran Out of Food in the Last Year: Not on file   Transportation Needs:     Lack of Transportation (Medical): Not on file    Lack of Transportation (Non-Medical):  Not on file   Physical Activity:     Days of Exercise per Week: Not on file    Minutes of Exercise per Session: Not on file   Stress:     Feeling of Stress : Not on file   Social Connections:     Frequency of Communication with Friends and Family: Not on file    Frequency of Social Gatherings with Friends and Family: Not on file    Attends Zoroastrianism Services: Not on file    Active Member of [address] [place] [address] [employer] or Organizations: Not on file    Attends Club or Organization Meetings: Not on file    Marital Status: Not on file   Intimate Partner Violence:     Fear of Current or Ex-Partner: Not on file    Emotionally Abused: Not on file    Physically Abused: Not on file    Sexually Abused: Not on file   Housing Stability:     Unable to Pay for Housing in the Last Year: Not on file    Number of [place] in the Last Year: Not on file    Unstable Housing in the Last Year: Not on file     Current Facility-Administered Medications   Medication Dose Route Frequency Provider Last Rate Last Admin    phenylephrine (STEVE-SYNEPHRINE) 100 mcg/mL injection (FOR PRIAPISM)  100 mcg IntraCAVernosal Q5 Min PRN Roro Varela MD   100 mcg at 05/24/22 0550     Current Outpatient Medications   Medication Sig Dispense Refill    morphine (MS CONTIN) 15 MG extended release tablet       ketorolac (TORADOL) 10 MG tablet       DULoxetine (CYMBALTA) 30 MG extended release capsule       apixaban (ELIQUIS) 5 MG TABS tablet Take 1 tablet by mouth 2 times daily 60 tablet 0    pantoprazole (PROTONIX) 40 MG tablet Take 1 tablet by mouth every morning (before breakfast) 30 tablet 0    folic acid (FOLVITE) 1 MG tablet Take 2 tablets by mouth daily 30 tablet 3    oxyCODONE HCl (OXY-IR) 10 MG immediate release tablet Take 10 mg by mouth every 4 hours as needed for Pain.  albuterol sulfate HFA (PROAIR HFA) 108 (90 Base) MCG/ACT inhaler Albuterol HFA Inhaler 90 mcg/actuation  inhaled  2 puffs prn     Active      levalbuterol (XOPENEX) 0.31 MG/3ML nebulization Levalbuterol Nebulized    2 puffs prn     Active      docusate (COLACE, DULCOLAX) 100 MG CAPS Take 100 mg by mouth      ibuprofen (ADVIL;MOTRIN) 800 MG tablet ibuprofen 800 MG Oral Tablet  oral   prn    Active      hydroxyurea (HYDREA) 500 MG chemo capsule Take 1,000 mg by mouth daily        Allergies   Allergen Reactions    Morphine Shortness Of Breath     Other reaction(s): Histamine-like Reaction  Has asthma exacerbation with morphine-histamine type reaction         REVIEW OF SYSTEMS      General:  No fevers  Eyes:  No recent vison changes  ENT:  No sore throat, no nasal congestion  Cardiovascular:  no palpitations  Respiratory:   no cough, no wheezing  Gastrointestinal:  No abdominal pain, no vomiting, no diarrhea  Musculoskeletal:  No muscle pain, no joint pain  Skin:  No rash   Neurologic:  No speech problems, no headache, no extremity numbness, no extremity weakness  Genitourinary:  No dysuria. Persistent erection. Extremities:  no edema, no pain      Unless otherwise stated in this report, this patient's positive and negative responses for review of systems (constitutional, eyes, ENT, cardiovascular, respiratory, gastrointestinal, neurological, genitourinary, musculoskeletal, integument systems and systems related to the presenting problem) are either stated in the preceding paragraph, were not pertinent or were negative for the symptoms and/or complaints related to the medical problem. PHYSICAL EXAM  /72   Pulse 76   Temp 98.3 °F (36.8 °C) (Oral)   Resp 16   Ht 5' 8\" (1.727 m)   Wt 190 lb (86.2 kg)   SpO2 95%   BMI 28.89 kg/m²   GENERAL APPEARANCE: Awake and alert. Cooperative. No acute distress. HEAD: Normocephalic. Atraumatic.   EYES:  EOM's grossly intact. ENT: Mucous membranes are moist.   NECK: Supple, trachea midline. HEART: RRR. LUNGS: Respirations unlabored. Speaking comfortably in full sentences. ABDOMEN: Soft. Non-distended. Non-tender. No guarding or rebound. Chaperoned  exam done with nurse Miesha in the room. He is a fully erect penis that is tender to palpation. EXTREMITIES: No peripheral edema. MAEE. No acute deformities. SKIN: Warm, dry and intact. No acute rashes. NEUROLOGICAL: Alert and oriented X 3. CN II-XII grossly intact. PSYCHIATRIC: Normal mood and affect. LABS  I have reviewed all labs for this visit.    Results for orders placed or performed during the hospital encounter of 05/24/22   CBC with Auto Differential   Result Value Ref Range    WBC 13.6 (H) 4.0 - 11.0 K/uL    RBC 2.30 (L) 4.20 - 5.90 M/uL    Hemoglobin 7.6 (L) 13.5 - 17.5 g/dL    Hematocrit 21.4 (L) 40.5 - 52.5 %    MCV 93.0 80.0 - 100.0 fL    MCH 33.0 26.0 - 34.0 pg    MCHC 35.5 31.0 - 36.0 g/dL    RDW 24.5 (H) 12.4 - 15.4 %    Platelets 717 (H) 521 - 450 K/uL    MPV 7.4 5.0 - 10.5 fL    Neutrophils % 70.5 %    Lymphocytes % 19.8 %    Monocytes % 7.8 %    Eosinophils % 1.2 %    Basophils % 0.7 %    Neutrophils Absolute 9.6 (H) 1.7 - 7.7 K/uL    Lymphocytes Absolute 2.7 1.0 - 5.1 K/uL    Monocytes Absolute 1.1 0.0 - 1.3 K/uL    Eosinophils Absolute 0.2 0.0 - 0.6 K/uL    Basophils Absolute 0.1 0.0 - 0.2 K/uL   Comprehensive Metabolic Panel w/ Reflex to MG   Result Value Ref Range    Sodium 141 136 - 145 mmol/L    Potassium reflex Magnesium 3.4 (L) 3.5 - 5.1 mmol/L    Chloride 108 99 - 110 mmol/L    CO2 24 21 - 32 mmol/L    Anion Gap 9 3 - 16    Glucose 144 (H) 70 - 99 mg/dL    BUN 7 7 - 20 mg/dL    CREATININE 0.8 (L) 0.9 - 1.3 mg/dL    GFR Non-African American >60 >60    GFR African American >60 >60    Calcium 8.9 8.3 - 10.6 mg/dL    Total Protein 6.9 6.4 - 8.2 g/dL    Albumin 4.6 3.4 - 5.0 g/dL    Albumin/Globulin Ratio 2.0 1.1 - 2.2    Total Bilirubin 5.1 (H) 0.0 - 1.0 mg/dL    Alkaline Phosphatase 85 40 - 129 U/L    ALT 10 10 - 40 U/L    AST 23 15 - 37 U/L   Reticulocytes   Result Value Ref Range    Retic Ct Pct 10.55 (H) 0.50 - 2.18 %    Retic Ct Abs 0.247 M/uL    Immature Retic Fract 0.74 (H) 0.21 - 0.37   Magnesium   Result Value Ref Range    Magnesium 1.90 1.80 - 2.40 mg/dL           RADIOLOGY  X-RAYS: ALL IMAGES INCLUDING PLAIN FILMS, CT, ULTRASOUND AND MRI HAVE BEEN READ BY THE RADIOLOGIST. I have personally reviewed plain film images and have reviewed the radiology reports. No orders to display              Rechecks: Physical assessment performed. Immediate detumescence was achieved after priapism drainage. I will reassess. Procedures: Priapism detumescence. Indication priapism. Patient gave verbal consent for the procedure. Risks and benefits of the procedure were discussed with the patient. Using sterile technique with sterile gloves the penis was cleaned with Betadine. A dorsal penile block was performed with 7 cc of 1% lidocaine without epinephrine. This was done without complication. I then used an 18-gauge needle and a 50 cc syringe and drained blood from the right corpus cavernosum. I was able to withdraw 50 cc of dark red blood. I then injected 100 mcg of phenylephrine in the right corpus cavernosum and 100 mcg into the left corpus cavernosum. I did achieve detumescence. ED COURSE/MDM  Patient seen and evaluated. Here the patient is afebrile with normal vitals signs. Old records reviewed, including details of his recent ER visit less than 2 weeks ago for similar presentation. Here he has priapism with a fully erect penis. I did place an ultrasound-guided IV in his left upper arm. He was given IV Dilaudid, IV fluids and IV Zofran. Lab work is pending. Priapism was drained as above. He is anemic here slightly lower than baseline but not to the point of transfusion. Remainder of his lab work appears to be baseline. He will need to be reassessed to ensure the priapism has not recurred. I did stressed to him the importance of following up with urology. He has been referred in the past but has failed to follow-up on an outpatient basis. Labs and imaging reviewed and results discussed with patient. Patient was reassessed as noted above . Plan of care discussed with patient. Patient in agreement with plan. Patient was pending reassessment at the time of shift change. As long as his priapism has not returned he will be discharged home. On reassessment his penis is flaccid. No return of the erection. I think he is appropriate for discharge home. Patient was given scripts for the following medications. I counseled patient how to take these medications. New Prescriptions    No medications on file     No prescriptions given. CLINICAL IMPRESSION  1. 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 rate 16, height 5' 8\" (1.727 m), weight 190 lb (86.2 kg), SpO2 95 %. [facility] was discharged to home in stable condition.     (Please note this note was completed with a voice recognition program.  Efforts were made to edit the dictations but occasionally words are mis-transcribed.)        Caitlyn Hoff MD  05/24/22 Kiran Mcmahan MD  05/24/22 1890

## 2022-05-26 ENCOUNTER — CLINICAL DOCUMENTATION (OUTPATIENT)
Dept: CASE MANAGEMENT | Age: 22
End: 2022-05-26

## 2022-05-26 NOTE — MANAGEMENT PLAN
Patient Management Plan          Pt. 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 Lo Valiente in Urology for follow up  Limit narcotics, 1 dose of Toradol 50 mg IV, Tylenol for chronic pain issues  Chest pain - troponin, possible CT pulmonary  [facility], [phone],  Kelli Bowens  CBC, CMP, sed rate, reticulocyte  XR as needed   No prescriptions for narcotics  Contact [facility] for pain management plan while inpatient         During Business Hours:     After Hours:       Challenges for Self Management: Utilization: ED/Hospital admissions and Behavioral Health Factors (excessive alcohol, recreational drug use, high risk behaviors)          Medication Management: Poor Adherence and Poly-pharm           Advanced Care Plan: None                         * This plan has been created by the Care Coordination Committee, a multi-disciplinary team. The patient and their physician were invited to participate in this plan.  This management plan is intended to provide consistent evaluation and treatment for this patient not to supersede physician judgment. *

## 2022-05-27 ENCOUNTER — HOSPITAL ENCOUNTER (EMERGENCY)
Age: 22
Discharge: HOME | End: 2022-05-27
Attending: EMERGENCY MEDICINE
Payer: COMMERCIAL

## 2022-05-27 VITALS
OXYGEN SATURATION: 93 % | BODY MASS INDEX: 28.79 KG/M2 | SYSTOLIC BLOOD PRESSURE: 104 MMHG | RESPIRATION RATE: 20 BRPM | HEART RATE: 82 BPM | WEIGHT: 190 LBS | DIASTOLIC BLOOD PRESSURE: 82 MMHG | HEIGHT: 68 IN | TEMPERATURE: 98.1 F

## 2022-05-27 DIAGNOSIS — D64.9 CHRONIC ANEMIA: ICD-10-CM

## 2022-05-27 DIAGNOSIS — N48.30 PRIAPISM: Primary | ICD-10-CM

## 2022-05-27 DIAGNOSIS — D57.00 SICKLE CELL DISEASE WITH CRISIS (HCC): ICD-10-CM

## 2022-05-27 LAB
A/G RATIO: 2 (ref 1.1–2.2)
ALBUMIN SERPL-MCNC: 4.7 G/DL (ref 3.4–5)
ALP BLD-CCNC: 92 U/L (ref 40–129)
ALT SERPL-CCNC: 18 U/L (ref 10–40)
ANION GAP SERPL CALCULATED.3IONS-SCNC: 13 MMOL/L (ref 3–16)
AST SERPL-CCNC: 35 U/L (ref 15–37)
BACTERIA: ABNORMAL /HPF
BASOPHILS ABSOLUTE: 0.2 K/UL (ref 0–0.2)
BASOPHILS RELATIVE PERCENT: 2 %
BILIRUB SERPL-MCNC: 5.5 MG/DL (ref 0–1)
BILIRUBIN URINE: NEGATIVE
BLOOD, URINE: ABNORMAL
BUN BLDV-MCNC: 5 MG/DL (ref 7–20)
CALCIUM SERPL-MCNC: 9.1 MG/DL (ref 8.3–10.6)
CHLORIDE BLD-SCNC: 105 MMOL/L (ref 99–110)
CLARITY: CLEAR
CO2: 22 MMOL/L (ref 21–32)
COLOR: YELLOW
CREAT SERPL-MCNC: 0.6 MG/DL (ref 0.9–1.3)
EOSINOPHILS ABSOLUTE: 0.1 K/UL (ref 0–0.6)
EOSINOPHILS RELATIVE PERCENT: 0.8 %
GFR AFRICAN AMERICAN: >60
GFR NON-AFRICAN AMERICAN: >60
GLUCOSE BLD-MCNC: 98 MG/DL (ref 70–99)
GLUCOSE URINE: NEGATIVE MG/DL
HCT VFR BLD CALC: 23.8 % (ref 40.5–52.5)
HEMOGLOBIN: 8.3 G/DL (ref 13.5–17.5)
KETONES, URINE: NEGATIVE MG/DL
LEUKOCYTE ESTERASE, URINE: NEGATIVE
LYMPHOCYTES ABSOLUTE: 1.8 K/UL (ref 1–5.1)
LYMPHOCYTES RELATIVE PERCENT: 14.7 %
MCH RBC QN AUTO: 34.3 PG (ref 26–34)
MCHC RBC AUTO-ENTMCNC: 35 G/DL (ref 31–36)
MCV RBC AUTO: 98 FL (ref 80–100)
MICROSCOPIC EXAMINATION: YES
MONOCYTES ABSOLUTE: 0.8 K/UL (ref 0–1.3)
MONOCYTES RELATIVE PERCENT: 6.8 %
MUCUS: ABNORMAL /LPF
NEUTROPHILS ABSOLUTE: 9.1 K/UL (ref 1.7–7.7)
NEUTROPHILS RELATIVE PERCENT: 75.7 %
NITRITE, URINE: NEGATIVE
PDW BLD-RTO: 26.2 % (ref 12.4–15.4)
PH UA: 7 (ref 5–8)
PLATELET # BLD: 134 K/UL (ref 135–450)
PMV BLD AUTO: 8.8 FL (ref 5–10.5)
POTASSIUM SERPL-SCNC: 4 MMOL/L (ref 3.5–5.1)
PROTEIN UA: ABNORMAL MG/DL
RBC # BLD: 2.43 M/UL (ref 4.2–5.9)
RBC UA: ABNORMAL /HPF (ref 0–4)
SODIUM BLD-SCNC: 140 MMOL/L (ref 136–145)
SPECIFIC GRAVITY UA: 1.01 (ref 1–1.03)
TOTAL PROTEIN: 7.1 G/DL (ref 6.4–8.2)
URINE TYPE: ABNORMAL
UROBILINOGEN, URINE: 1 E.U./DL
WBC # BLD: 12 K/UL (ref 4–11)
WBC UA: ABNORMAL /HPF (ref 0–5)

## 2022-05-27 PROCEDURE — 85025 COMPLETE CBC W/AUTO DIFF WBC: CPT

## 2022-05-27 PROCEDURE — 81001 URINALYSIS AUTO W/SCOPE: CPT

## 2022-05-27 PROCEDURE — 80053 COMPREHEN METABOLIC PANEL: CPT

## 2022-05-27 PROCEDURE — 99283 EMERGENCY DEPT VISIT LOW MDM: CPT

## 2022-05-27 PROCEDURE — 85660 RBC SICKLE CELL TEST: CPT

## 2022-05-27 RX ORDER — KETOROLAC TROMETHAMINE 30 MG/ML
60 INJECTION, SOLUTION INTRAMUSCULAR; INTRAVENOUS ONCE
Status: DISCONTINUED | OUTPATIENT
Start: 2022-05-27 | End: 2022-05-27

## 2022-05-27 RX ORDER — KETOROLAC TROMETHAMINE 30 MG/ML
30 INJECTION, SOLUTION INTRAMUSCULAR; INTRAVENOUS ONCE
Status: DISCONTINUED | OUTPATIENT
Start: 2022-05-27 | End: 2022-05-27 | Stop reason: HOSPADM

## 2022-05-27 ASSESSMENT — ENCOUNTER SYMPTOMS
SINUS PRESSURE: 0
ABDOMINAL PAIN: 0
RHINORRHEA: 0
FACIAL SWELLING: 0
SORE THROAT: 0
DIARRHEA: 0
ANAL BLEEDING: 0
COUGH: 0
BLOOD IN STOOL: 0
VOICE CHANGE: 0
PHOTOPHOBIA: 0
BACK PAIN: 0
STRIDOR: 0
CHEST TIGHTNESS: 0
TROUBLE SWALLOWING: 0
VOMITING: 0
WHEEZING: 0
CONSTIPATION: 0
EYE PAIN: 0
EYE REDNESS: 0
NAUSEA: 0
EYE ITCHING: 0
EYE DISCHARGE: 0
ABDOMINAL DISTENTION: 0
SHORTNESS OF BREATH: 0

## 2022-05-27 ASSESSMENT — PAIN SCALES - GENERAL
PAINLEVEL_OUTOF10: 8
PAINLEVEL_OUTOF10: 10

## 2022-05-27 ASSESSMENT — PAIN DESCRIPTION - LOCATION
LOCATION: SCROTUM;GROIN
LOCATION: GROIN

## 2022-05-27 ASSESSMENT — PAIN - FUNCTIONAL ASSESSMENT
PAIN_FUNCTIONAL_ASSESSMENT: 0-10
PAIN_FUNCTIONAL_ASSESSMENT: 0-10
PAIN_FUNCTIONAL_ASSESSMENT: NONE - DENIES PAIN

## 2022-05-27 NOTE — CONSULTS
Urology Attending Consult Note      Reason for Consultation: priapism    History: 23 y/o male has sicle cell disease and recurrent priapism. He is non-compliant and fails to f/u as outpatient. He has had erection since 2AM and pain    Family History, Social History, Review of Systems:  Reviewed and agreed to as per chart    Vitals:  BP (!) 156/92   Pulse 86   Temp 98.1 °F (36.7 °C) (Oral)   Resp 21   Ht 5' 8\" (1.727 m)   Wt 190 lb (86.2 kg)   SpO2 94%   BMI 28.89 kg/m²   Temp  Av.1 °F (36.7 °C)  Min: 98.1 °F (36.7 °C)  Max: 98.1 °F (36.7 °C)    Intake/Output Summary (Last 24 hours) at 2022 0731  Last data filed at 2022 9922  Gross per 24 hour   Intake --   Output 400 ml   Net -400 ml         Physical:   Well developed, well nourished in no acute distress   Mood indicates no abnormalities. Pt doesnt appear depressed   Orientated to time and place   Neck is supple, trachea is midline   Respiratory effort is normal   Cardiovascular show no extremity swelling   Abdomen no masses or hernias are palpated, there is no tenderness. Liver and Spleen appear normal.   Skin show no abnormal lesions   Lymph nodes are not palpated in the inguinal, neck, or axillary area.      Male :   Penis appears normal and circumcised, erect   Urethral meatus is normal in size and location   Scrotum appears normal and both testicles appear normal in size and location      Labs:  WBC:    Lab Results   Component Value Date    WBC 13.6 2022     Hemoglobin/Hematocrit:    Lab Results   Component Value Date    HGB 7.6 2022    HCT 21.4 2022     BMP:    Lab Results   Component Value Date     2022    K 3.4 2022     2022    CO2 24 2022    BUN 7 2022    LABALBU 4.6 2022    CREATININE 0.8 2022    CALCIUM 8.9 2022    GFRAA >60 2022    LABGLOM >60 2022     PT/INR:    Lab Results   Component Value Date    PROTIME 14.4 2021 INR 1.26 09/28/2021     PTT:    Lab Results   Component Value Date    APTT 43.5 10/19/2021   [APTT    Impression/Plan: priapsim  1. Phenylephrine injected into penis, pt on monitor, lidocaine penile block also done  2.  Will check back to ensure penis flacid    Heri Juarez MD

## 2022-05-27 NOTE — LETTER
[facility]  ED  [address] [address]  Phone: [phone]  Fax: [phone]               May 27, 2022    Patient: SHERYL [facility] YANY   YOB: 2000   Date of Visit: 5/27/2022       To Whom It May Concern:    [facility] in our emergency department on 5/27/2022. He may return to work on 5-29-22.       Sincerely,                Signature:__________________________________

## 2022-05-27 NOTE — ED NOTES
[id number] - PS to [facility] per consult  Re: [email or web address]  0639 - Dr Puneet Balderas called back to speak with Dr Chris Young  05/27/22 8993

## 2022-05-27 NOTE — ED PROVIDER NOTES
Gayle Grant is a 24year old male with a history of sickle cell disease who presents with priapism which began around 2am this morning. He has had this frequently in the past, and has been referred to urologist, but has not yet followed through. He has chronic pain and is in pain management. He presents with a sustained erection in excess of 4 hours. He reports pain with urination but denies penile discharge. He denies abdominal or testicular pain. No nausea, vomiting, or diarrhea. BP (!) 156/92   Pulse 86   Temp 98.1 °F (36.7 °C) (Oral)   Resp 21   Ht 5' 8\" (1.727 m)   Wt 190 lb (86.2 kg)   SpO2 94%   BMI 28.89 kg/m²         Review of Systems   Constitutional: Negative for activity change, appetite change, chills, diaphoresis, fatigue and fever. HENT: Negative. Negative for congestion, dental problem, ear pain, facial swelling, rhinorrhea, sinus pressure, sneezing, sore throat, tinnitus, trouble swallowing and voice change. Eyes: Negative for photophobia, pain, discharge, redness, itching and visual disturbance. Respiratory: Negative for cough, chest tightness, shortness of breath, wheezing and stridor. Cardiovascular: Negative for chest pain, palpitations and leg swelling. Gastrointestinal: Negative for abdominal distention, abdominal pain, anal bleeding, blood in stool, constipation, diarrhea, nausea and vomiting. Genitourinary: Positive for difficulty urinating and penile pain (sustained erection x4.5 hours). Negative for dysuria, frequency, hematuria, penile discharge, testicular pain and urgency. Musculoskeletal: Negative for back pain, joint swelling, neck pain and neck stiffness. Skin: Negative for rash and wound. Neurological: Negative for dizziness, syncope, facial asymmetry, speech difficulty, weakness, numbness and headaches. Hematological: Does not bruise/bleed easily.    Psychiatric/Behavioral: Negative for agitation, confusion, hallucinations, self-injury, sleep disturbance and suicidal ideas. The patient is not nervous/anxious. All other systems reviewed and are negative. Physical Exam  Vitals and nursing note reviewed. Constitutional:       General: He is not in acute distress. Appearance: He is well-developed. HENT:      Head: Normocephalic and atraumatic. Right Ear: External ear normal.      Left Ear: External ear normal.      Nose: Nose normal.      Mouth/Throat:      Pharynx: No oropharyngeal exudate. Eyes:      General: No scleral icterus. Right eye: No discharge. Left eye: No discharge. Conjunctiva/sclera: Conjunctivae normal.      Pupils: Pupils are equal, round, and reactive to light. Neck:      Vascular: No JVD. Trachea: No tracheal deviation. Cardiovascular:      Rate and Rhythm: Normal rate and regular rhythm. Heart sounds: Normal heart sounds. No murmur heard. No friction rub. No gallop. Pulmonary:      Effort: Pulmonary effort is normal. No respiratory distress. Breath sounds: Normal breath sounds. No wheezing or rales. Abdominal:      General: Bowel sounds are normal. There is no distension. Palpations: Abdomen is soft. There is no mass. Tenderness: There is no abdominal tenderness. There is no guarding or rebound. Genitourinary:     Comments: Sustained erection, tender penile shaft. No inguinal mass or lesion. Testicles non-tender. Musculoskeletal:         General: No tenderness. Normal range of motion. Cervical back: Normal range of motion and neck supple. Lymphadenopathy:      Cervical: No cervical adenopathy. Skin:     General: Skin is warm and dry. Coloration: Skin is not pale. Findings: No erythema or rash. Neurological:      Mental Status: He is alert and oriented to person, place, and time. Cranial Nerves: No cranial nerve deficit. Motor: No abnormal muscle tone.       Coordination: Coordination normal.      Deep Tendon Reflexes: Reflexes are normal and symmetric. Reflexes normal.   Psychiatric:         Behavior: Behavior normal.         Thought Content: Thought content normal.         Judgment: Judgment normal.          Procedures     MDM   Results for orders placed or performed during the hospital encounter of 05/27/22   Urinalysis   Result Value Ref Range    Color, UA Yellow Straw/Yellow    Clarity, UA Clear Clear    Glucose, Ur Negative Negative mg/dL    Bilirubin Urine Negative Negative    Ketones, Urine Negative Negative mg/dL    Specific Gravity, UA 1.015 1.005 - 1.030    Blood, Urine SMALL (A) Negative    pH, UA 7.0 5.0 - 8.0    Protein, UA TRACE (A) Negative mg/dL    Urobilinogen, Urine 1.0 <2.0 E.U./dL    Nitrite, Urine Negative Negative    Leukocyte Esterase, Urine Negative Negative    Microscopic Examination YES     Urine Type NotGiven    Microscopic Urinalysis   Result Value Ref Range    Mucus, UA 1+ (A) None Seen /LPF    WBC, UA 0-2 0 - 5 /HPF    RBC, UA 3-4 0 - 4 /HPF    Bacteria, UA 2+ (A) None Seen /HPF       I spoke with Dr. Mai Larry, urology. We thoroughly discussed the history, physical exam, laboratory and imaging studies, as well as, emergency department course. Based upon that discussion, we've decided to discharge [facility] home. We've agreed upon prompt follow in their office for a re-assessment. Final Impression    1. Priapism    2. Sickle cell disease with crisis ([facility] [address]    3. Chronic anemia        Blood pressure 125/66, pulse 65, temperature 98.1 °F (36.7 °C), temperature source Oral, resp. rate 20, height 5' 8\" (1.727 m), weight 190 lb (86.2 kg), SpO2 93 %. EKG Interpretation. 6:40 am Consultation with Urology, Dr. Mai Larry. He requests pharmacy to mix Phenylephrine 500 mcg/ml x4cc. Type and screen, Hgb >10 if possible. He will be in to evaluate the patient. Recheck 9am  Patient sleeping soundly, but awakens easily. Priapism resolved. Patient denies pain.       Anthon [place], MD  05/27/22 3929 Sharonda Gabriel MD  05/27/22 1863

## 2022-05-27 NOTE — PROGRESS NOTES
Pt had 2 separate injections of 0.75mL of 500mcg/mL over 30 minutes apart and his penis is flaccid and he is feeling better. OK for discharge and I advised him he needs f/u with Dr Luna Kelsey.

## 2022-05-27 NOTE — ED NOTES
Pt refusing IV in hand and VS at this time. Dr. Tamara Rodriguez at bedside.        Tomy De Anda, RN  05/27/22 1512

## 2022-05-31 LAB — MISCELLANEOUS LAB TEST ORDER: NORMAL

## 2022-06-19 ENCOUNTER — HOSPITAL ENCOUNTER (EMERGENCY)
Age: 22
Discharge: HOME | End: 2022-06-19
Attending: EMERGENCY MEDICINE
Payer: COMMERCIAL

## 2022-06-19 ENCOUNTER — APPOINTMENT (OUTPATIENT)
Dept: GENERAL RADIOLOGY | Age: 22
End: 2022-06-19
Payer: COMMERCIAL

## 2022-06-19 VITALS
RESPIRATION RATE: 14 BRPM | DIASTOLIC BLOOD PRESSURE: 79 MMHG | HEIGHT: 69 IN | TEMPERATURE: 97.8 F | WEIGHT: 185 LBS | BODY MASS INDEX: 27.4 KG/M2 | SYSTOLIC BLOOD PRESSURE: 127 MMHG | OXYGEN SATURATION: 98 % | HEART RATE: 63 BPM

## 2022-06-19 DIAGNOSIS — N48.30 PRIAPISM: Primary | ICD-10-CM

## 2022-06-19 DIAGNOSIS — M25.562 ACUTE PAIN OF LEFT KNEE: ICD-10-CM

## 2022-06-19 PROCEDURE — 96375 TX/PRO/DX INJ NEW DRUG ADDON: CPT

## 2022-06-19 PROCEDURE — 96374 THER/PROPH/DIAG INJ IV PUSH: CPT

## 2022-06-19 PROCEDURE — 99284 EMERGENCY DEPT VISIT MOD MDM: CPT

## 2022-06-19 PROCEDURE — 2580000003 HC RX 258: Performed by: EMERGENCY MEDICINE

## 2022-06-19 PROCEDURE — 73562 X-RAY EXAM OF KNEE 3: CPT

## 2022-06-19 PROCEDURE — 96376 TX/PRO/DX INJ SAME DRUG ADON: CPT

## 2022-06-19 PROCEDURE — 6360000002 HC RX W HCPCS: Performed by: EMERGENCY MEDICINE

## 2022-06-19 RX ORDER — KETOROLAC TROMETHAMINE 30 MG/ML
15 INJECTION, SOLUTION INTRAMUSCULAR; INTRAVENOUS ONCE
Status: COMPLETED | OUTPATIENT
Start: 2022-06-19 | End: 2022-06-19

## 2022-06-19 RX ORDER — 0.9 % SODIUM CHLORIDE 0.9 %
1000 INTRAVENOUS SOLUTION INTRAVENOUS ONCE
Status: COMPLETED | OUTPATIENT
Start: 2022-06-19 | End: 2022-06-19

## 2022-06-19 RX ADMIN — KETOROLAC TROMETHAMINE 15 MG: 30 INJECTION, SOLUTION INTRAMUSCULAR at 08:20

## 2022-06-19 RX ADMIN — HYDROMORPHONE HYDROCHLORIDE 1 MG: 1 INJECTION, SOLUTION INTRAMUSCULAR; INTRAVENOUS; SUBCUTANEOUS at 07:09

## 2022-06-19 RX ADMIN — SODIUM CHLORIDE 1000 ML: 9 INJECTION, SOLUTION INTRAVENOUS at 07:07

## 2022-06-19 RX ADMIN — HYDROMORPHONE HYDROCHLORIDE 0.5 MG: 1 INJECTION, SOLUTION INTRAMUSCULAR; INTRAVENOUS; SUBCUTANEOUS at 08:20

## 2022-06-19 ASSESSMENT — PAIN DESCRIPTION - LOCATION
LOCATION: PENIS
LOCATION: KNEE

## 2022-06-19 ASSESSMENT — PAIN SCALES - GENERAL
PAINLEVEL_OUTOF10: 7
PAINLEVEL_OUTOF10: 7
PAINLEVEL_OUTOF10: 8
PAINLEVEL_OUTOF10: 6

## 2022-06-19 ASSESSMENT — PAIN DESCRIPTION - ORIENTATION: ORIENTATION: LEFT

## 2022-06-19 ASSESSMENT — PAIN DESCRIPTION - ONSET: ONSET: ON-GOING

## 2022-06-19 ASSESSMENT — PAIN DESCRIPTION - PAIN TYPE: TYPE: ACUTE PAIN

## 2022-06-19 ASSESSMENT — PAIN - FUNCTIONAL ASSESSMENT: PAIN_FUNCTIONAL_ASSESSMENT: 0-10

## 2022-06-19 ASSESSMENT — PAIN DESCRIPTION - DESCRIPTORS: DESCRIPTORS: ACHING

## 2022-06-19 ASSESSMENT — PAIN DESCRIPTION - FREQUENCY: FREQUENCY: CONTINUOUS

## 2022-06-19 NOTE — ED NOTES
Attempts to start IV access unsuccessful at this time. Dr Dolly Ham notified.      Ulices Leal RN  06/19/22 8089

## 2022-06-19 NOTE — ED PROVIDER NOTES
[facility]  ED PROVIDER NOTE    Patient Identification  Pt Name: Taya Edwards  MRN: [id number]  [place] 2000  Date of evaluation: 6/19/2022  Provider: Charlene Vail MD  PCP: Seb Flores MD    Chief Complaint  Other (Pt states he has a priapism. Unknown when it started) and Leg Pain (c/o leg swelling starting yesterday)      HPI  History provided by patient   This is a 24 y.o. male who presents to the ED for 2 issues. The first is priapism. He is not sure when it started. Woke up with it at 3:30 in the morning. He seems to go down slightly and then come back. He has a priapism about every 2 days. He typically tries to avoid coming into the emergency room. No nausea or vomiting. No fevers or chills. No abdominal pain. Attempted using ice without improvement. Also states that he was helping his family move yesterday. Afterwards, he started having left knee swelling. Denies any trauma or fall. He is still able to walk but it hurts. No pain in his thigh or calf or foot. Yeimy Flowood No chest pain or shortness of breath    ROS  12 systems reviewed, pertinent positives/negatives per HPI otherwise noted to be negative.     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)     Sickle cell anemia (HCC)      Past surgical history:   Past Surgical History:   Procedure Laterality Date    SPLENECTOMY         Home medications:   Previous Medications    ALBUTEROL SULFATE HFA (PROAIR HFA) 108 (90 BASE) MCG/ACT INHALER    Albuterol HFA Inhaler 90 mcg/actuation  inhaled  2 puffs prn     Active    APIXABAN (ELIQUIS) 5 MG TABS TABLET    Take 1 tablet by mouth 2 times daily    DOCUSATE (COLACE, DULCOLAX) 100 MG CAPS    Take 100 mg by mouth DULOXETINE (CYMBALTA) 30 MG EXTENDED RELEASE CAPSULE        FOLIC ACID (FOLVITE) 1 MG TABLET    Take 2 tablets by mouth daily    HYDROXYUREA (HYDREA) 500 MG CHEMO CAPSULE    Take 1,000 mg by mouth daily     IBUPROFEN (ADVIL;MOTRIN) 800 MG TABLET    ibuprofen 800 MG Oral Tablet  oral   prn    Active    KETOROLAC (TORADOL) 10 MG TABLET        LEVALBUTEROL (XOPENEX) 0.31 MG/3ML NEBULIZATION    Levalbuterol Nebulized    2 puffs prn     Active    MORPHINE (MS CONTIN) 15 MG EXTENDED RELEASE TABLET        OXYCODONE HCL (OXY-IR) 10 MG IMMEDIATE RELEASE TABLET    Take 10 mg by mouth every 4 hours as needed for Pain. PANTOPRAZOLE (PROTONIX) 40 MG TABLET    Take 1 tablet by mouth every morning (before breakfast)       Social history:  reports that he has never smoked. He has never used smokeless tobacco. He reports previous alcohol use. He reports that he does not use drugs. Family history:    Family History   Problem Relation Age of Onset    Other Father         sarcoidosis         Exam  ED Triage Vitals [06/19/22 0523]   BP Temp Temp Source Heart Rate Resp SpO2 Height Weight   (!) 122/51 98.2 °F (36.8 °C) Oral 57 18 97 % 5' 9\" (1.753 m) 185 lb (83.9 kg)     Nursing note and vitals reviewed. Constitutional: In no acute distress  HENT:      Head: Normocephalic      Ears: External ears normal.      Nose: Nose normal.     Mouth: Membrane mucosa moist   Eyes: No discharge. Neck: Supple. Trachea midline. Cardiovascular: Regular rate. Warm extremities  Pulmonary/Chest: Effort normal. No respiratory distress. Abdominal: Soft. No distension. Nontender  : erection   Rectal: Deferred   Musculoskeletal: Moves all extremities. No gross deformity. Left knee mildly swollen compared to right. Able to range at least 90 degrees with no difficulty. No redness or warmth  Neurological: Alert and oriented. Face symmetric. Speech is clear. Skin: Warm and dry. Psychiatric: Normal mood and affect.  Behavior is normal.    Procedures    Radiology  XR KNEE LEFT (3 VIEWS)   Final Result   No acute osseous injury of the left knee. Labs  No results found for this visit on 06/19/22. Screenings   Blade Coma Scale  Eye Opening: Spontaneous  Best Verbal Response: Oriented  Best Motor Response: Obeys commands  Blade Coma Scale Score: 15       MDM and ED Course  This is a 24 y.o. male who presents to the ED for left knee swelling, discomfort. Possibly secondary to hemarthrosis given history of sickle cell disease. Osteomyelitis in differential but lower. Doubt that this is septic joint since he has good range of motion with minimal pain on my exam.  Discussed risks and benefits of arthrocentesis with the patient. Upon having this discussion, patient does not wish to have knee arthrocentesis and would like initial medical management with compression, ice, pain relief. I do not believe that this is DVT or fracture    Patient also has priapism that may have started at 3:30 in the morning. It has since been 3 hours. Given his significant history of priapism happening about every 2 days, I counseled the patient that he could have permanent damage to his penis. At this time, he wishes to be treated with pain medication and fluids and ice before I attempt any sort of aspiration or phenylephrine. Risks and benefits of waiting were discussed with the patient. I do believe he has medical decision-making capacity     -----------    Reassessed patient. Patient's erection has since improved but not completely. He still refuses needle aspiration. Again, I discussed that this could result in permanent damage and he understands this.    ---------    I reassessed the patient a third time, the patient's erection has improved more but again it is not completely detumescent. The patient again refuses needle aspiration/phenylephrine injection.   He states that this level of erection is improved to the point that he is comfortable. He has a urology appointment next week. Patient's x-ray shows no evidence of osteomyelitis, not that I thought that this was the case. Discharged with all questions answered and return precautions given    [unfilled]    Is this patient to be included in the SEP-1 Core Measure due to severe sepsis or septic shock? No   Exclusion criteria - the patient is NOT to be included for SEP-1 Core Measure due to: Infection is not suspected        Final Impression  1. Priapism    2. Acute pain of left knee        Blood pressure 111/75, pulse 83, temperature 98.2 °F (36.8 °C), temperature source Oral, resp. rate 18, height 5' 9\" (1.753 m), weight 185 lb (83.9 kg), SpO2 98 %. Disposition:  DISPOSITION        Patient Referrals:  Hayden Mercer MD  [facility] [phone]    In 1 day        Discharge Medications:  New Prescriptions    No medications on file       Discontinued Medications:  Discontinued Medications    No medications on file       This chart was generated using the [address] [place] [address] dictation system. I created this record but it may contain dictation errors given the limitations of this technology.         Qian Atkinson MD  06/19/22 3229

## 2022-06-19 NOTE — ED NOTES
Pt discharged with follow up instructions. Pt denies any needs or questions about discharge. Pt's mother is providing him a ride home.      Andre Costa RN  06/19/22 0197

## 2022-07-05 PROCEDURE — 93971 EXTREMITY STUDY: CPT

## 2022-07-05 NOTE — CARE COORDINATION
Patient was registered at 2:21 pm on 07/05/2022 for his 2:00 appointment; however due patient volume, patient is unable to have venous duplex exam completed with late arrival.  Patient will be rescheduled for tomorrow, 07/06/2022, at 1:00 pm with an arrival time of 12:30 pm for registration.

## 2022-07-06 ENCOUNTER — HOSPITAL ENCOUNTER (OUTPATIENT)
Dept: VASCULAR LAB | Age: 22
Discharge: HOME | End: 2022-07-05
Payer: COMMERCIAL

## 2022-07-06 DIAGNOSIS — D57.00 HB-SS DISEASE WITH CRISIS (HCC): ICD-10-CM

## 2022-07-06 DIAGNOSIS — M79.605 PAIN OF LEFT LOWER EXTREMITY: ICD-10-CM

## 2022-07-22 NOTE — PROGRESS NOTES
4 Eyes Admission Assessment     I agree as the admission nurse that 2 RN's have performed a thorough Head to Toe Skin Assessment on the patient. ALL assessment sites listed below have been assessed on admission. Areas assessed by both nurses:   [x]   Head, Face, and Ears   [x]   Shoulders, Back, and Chest  [x]   Arms, Elbows, and Hands   [x]   Coccyx, Sacrum, and Ischium  [x]   Legs, Feet, and Heels        Does the Patient have Skin Breakdown?   No         Shaun Prevention initiated:  No   Wound Care Orders initiated:  No      [facility] nurse consulted for Pressure Injury (Stage 3,4, Unstageable, DTI, NWPT, and Complex wounds) or Shaun score 18 or lower:  No      Nurse 1 eSignature: Electronically signed by Yael Booker RN on 5/23/21 at 12:09 AM EDT    **SHARE this note so that the co-signing nurse is able to place an eSignature**    Nurse 2 eSignature: Electronically signed by Nacho Hidalgo RN on 5/23/21 at 2:41 AM EDT [employer]

## 2022-07-26 ENCOUNTER — HOSPITAL ENCOUNTER (EMERGENCY)
Age: 22
Discharge: LEFT BEFORE DISCHARGE | End: 2022-07-26
Attending: EMERGENCY MEDICINE
Payer: COMMERCIAL

## 2022-07-26 VITALS
BODY MASS INDEX: 27.32 KG/M2 | SYSTOLIC BLOOD PRESSURE: 131 MMHG | TEMPERATURE: 99 F | WEIGHT: 185 LBS | DIASTOLIC BLOOD PRESSURE: 87 MMHG | OXYGEN SATURATION: 95 % | HEART RATE: 77 BPM | RESPIRATION RATE: 18 BRPM

## 2022-07-26 DIAGNOSIS — G89.29 OTHER CHRONIC PAIN: Primary | ICD-10-CM

## 2022-07-26 DIAGNOSIS — D57.1 SICKLE CELL DISEASE WITHOUT CRISIS (HCC): ICD-10-CM

## 2022-07-26 LAB
A/G RATIO: 1.7 (ref 1.1–2.2)
ALBUMIN SERPL-MCNC: 4.8 G/DL (ref 3.4–5)
ALP BLD-CCNC: 93 U/L (ref 40–129)
ALT SERPL-CCNC: 18 U/L (ref 10–40)
ANION GAP SERPL CALCULATED.3IONS-SCNC: 11 MMOL/L (ref 3–16)
ANISOCYTOSIS: ABNORMAL
AST SERPL-CCNC: 40 U/L (ref 15–37)
BASOPHILS ABSOLUTE: 0.1 K/UL (ref 0–0.2)
BASOPHILS RELATIVE PERCENT: 0.9 %
BILIRUB SERPL-MCNC: 4.3 MG/DL (ref 0–1)
BUN BLDV-MCNC: 8 MG/DL (ref 7–20)
CALCIUM SERPL-MCNC: 9.8 MG/DL (ref 8.3–10.6)
CHLORIDE BLD-SCNC: 104 MMOL/L (ref 99–110)
CO2: 24 MMOL/L (ref 21–32)
CREAT SERPL-MCNC: 0.6 MG/DL (ref 0.9–1.3)
EOSINOPHILS ABSOLUTE: 0.1 K/UL (ref 0–0.6)
EOSINOPHILS RELATIVE PERCENT: 0.7 %
GFR AFRICAN AMERICAN: >60
GFR NON-AFRICAN AMERICAN: >60
GLUCOSE BLD-MCNC: 110 MG/DL (ref 70–99)
HCT VFR BLD CALC: 24.5 % (ref 40.5–52.5)
HEMATOLOGY PATH CONSULT: NORMAL
HEMATOLOGY PATH CONSULT: YES
HEMOGLOBIN: 8.7 G/DL (ref 13.5–17.5)
IMMATURE RETIC FRACT: 0.69 (ref 0.21–0.37)
LYMPHOCYTES ABSOLUTE: 2 K/UL (ref 1–5.1)
LYMPHOCYTES RELATIVE PERCENT: 12.8 %
MCH RBC QN AUTO: 33.1 PG (ref 26–34)
MCHC RBC AUTO-ENTMCNC: 35.6 G/DL (ref 31–36)
MCV RBC AUTO: 93.1 FL (ref 80–100)
MONOCYTES ABSOLUTE: 1.6 K/UL (ref 0–1.3)
MONOCYTES RELATIVE PERCENT: 10.4 %
NEUTROPHILS ABSOLUTE: 11.7 K/UL (ref 1.7–7.7)
NEUTROPHILS RELATIVE PERCENT: 75.2 %
PDW BLD-RTO: 22.8 % (ref 12.4–15.4)
PLATELET # BLD: 512 K/UL (ref 135–450)
PLATELET SLIDE REVIEW: ABNORMAL
PMV BLD AUTO: 7.8 FL (ref 5–10.5)
POTASSIUM SERPL-SCNC: 3.8 MMOL/L (ref 3.5–5.1)
RBC # BLD: 2.63 M/UL (ref 4.2–5.9)
RETICULOCYTE ABSOLUTE COUNT: 0.42 M/UL
RETICULOCYTE COUNT PCT: 15.95 % (ref 0.5–2.18)
SLIDE REVIEW: ABNORMAL
SODIUM BLD-SCNC: 139 MMOL/L (ref 136–145)
SPECIMEN STATUS: NORMAL
TOTAL PROTEIN: 7.7 G/DL (ref 6.4–8.2)
WBC # BLD: 15.5 K/UL (ref 4–11)

## 2022-07-26 PROCEDURE — 36415 COLL VENOUS BLD VENIPUNCTURE: CPT

## 2022-07-26 PROCEDURE — 85045 AUTOMATED RETICULOCYTE COUNT: CPT

## 2022-07-26 PROCEDURE — 99283 EMERGENCY DEPT VISIT LOW MDM: CPT

## 2022-07-26 PROCEDURE — 80053 COMPREHEN METABOLIC PANEL: CPT

## 2022-07-26 PROCEDURE — 85025 COMPLETE CBC W/AUTO DIFF WBC: CPT

## 2022-07-26 RX ORDER — 0.9 % SODIUM CHLORIDE 0.9 %
1000 INTRAVENOUS SOLUTION INTRAVENOUS ONCE
Status: DISCONTINUED | OUTPATIENT
Start: 2022-07-26 | End: 2022-07-26 | Stop reason: HOSPADM

## 2022-07-26 RX ORDER — KETOROLAC TROMETHAMINE 30 MG/ML
50 INJECTION, SOLUTION INTRAMUSCULAR; INTRAVENOUS ONCE
Status: DISCONTINUED | OUTPATIENT
Start: 2022-07-26 | End: 2022-07-26 | Stop reason: HOSPADM

## 2022-07-26 ASSESSMENT — PAIN - FUNCTIONAL ASSESSMENT: PAIN_FUNCTIONAL_ASSESSMENT: 0-10

## 2022-07-26 ASSESSMENT — LIFESTYLE VARIABLES: HOW OFTEN DO YOU HAVE A DRINK CONTAINING ALCOHOL: NEVER

## 2022-07-26 ASSESSMENT — PAIN SCALES - GENERAL: PAINLEVEL_OUTOF10: 8

## 2022-07-26 NOTE — ED NOTES
Labs collected from straight stick using US in [facility].      Jazmine Prdao RN  07/26/22 5065

## 2022-07-26 NOTE — ED PROVIDER NOTES
[address] [place] [address]  ED  EMERGENCY DEPARTMENT ENCOUNTER      This patient was not seen and evaluated by the attending physician. Pt Name: Kendra Hill  MRN: [id number]  [place] 2000  Date of evaluation: 7/26/2022  Provider: Mati Ordoñez APRN - CNP-C  PCP: Berkley Gaona MD      History provided by the patient. CHIEFCOMPLAINT:     Chief Complaint   Patient presents with    Other     Sickle cell pain started last night       HISTORY OF PRESENT ILLNESS:      Kendra Hill is a 20 y.o. male who presents to [address] [place] [address]  ED with complaints of back pain, patient states that he has had sickle cell pain that started last night, patient has a history of sickle cell, well-known to this department. He denies any priapism today, denies any difficulty breathing or shortness of breath. He is resting comfortably the bed playing on his phone. He is here for further evaluation. Nj Nelson  SEVERITY:8  DURATION:last night  MODIFYING FACTORS: none noted    Nursing Notes were reviewed     REVIEW OF SYSTEMS:     Review of Systems  All systems, a total of 10, are reviewed and negative except for those that were just noted in history present illness.         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)     Sickle cell anemia (HCC)          SURGICAL HISTORY:      Past Surgical History:   Procedure Laterality Date    SPLENECTOMY           CURRENT MEDICATIONS:       Discharge Medication List as of 7/26/2022 10:45 AM        CONTINUE these medications which have NOT CHANGED    Details   morphine (MS CONTIN) 15 MG extended release tablet Historical Med      ketorolac (TORADOL) 10 MG tablet Historical Med      DULoxetine (CYMBALTA) 30 MG extended release capsule Historical Med      apixaban (ELIQUIS) 5 MG TABS tablet Take 1 tablet by mouth 2 times daily, Disp-60 tablet, R-0Normal      pantoprazole (PROTONIX) 40 MG tablet Take 1 tablet by mouth every morning (before breakfast), Disp-30 tablet, [id number]      folic acid (FOLVITE) 1 MG tablet Take 2 tablets by mouth daily, Disp-30 tablet, R-3Normal      oxyCODONE HCl (OXY-IR) 10 MG immediate release tablet Take 10 mg by mouth every 4 hours as needed for Pain. Historical Med      albuterol sulfate HFA (PROAIR HFA) 108 (90 Base) MCG/ACT inhaler Albuterol HFA Inhaler 90 mcg/actuation  inhaled  2 puffs prn     ActiveHistorical Med      levalbuterol (XOPENEX) 0.31 MG/3ML nebulization Levalbuterol Nebulized    2 puffs prn     ActiveHistorical Med      docusate (COLACE, DULCOLAX) 100 MG CAPS Take 100 mg by mouthHistorical Med      ibuprofen (ADVIL;MOTRIN) 800 MG tablet ibuprofen 800 MG Oral Tablet  oral   prn    ActiveHistorical Med      hydroxyurea (HYDREA) 500 MG chemo capsule Take 1,000 mg by mouth daily Historical Med               ALLERGIES:    Morphine    FAMILY HISTORY:       Family History   Problem Relation Age of Onset    Other Father         sarcoidosis          SOCIAL HISTORY:     Social History     Socioeconomic History    Marital status: Single     Spouse name: None    Number of children: 0    Years of education: None    Highest education level: None   Tobacco Use    Smoking status: Never    Smokeless tobacco: Never   Vaping Use    Vaping Use: Never used   Substance and Sexual Activity    Alcohol use: Not Currently    Drug use: Never    Sexual activity: Not Currently       SCREENINGS:             PHYSICAL EXAM:       ED Triage Vitals [07/26/22 0838]   BP Temp Temp Source Heart Rate Resp SpO2 Height Weight   131/87 99 °F (37.2 °C) Oral 77 18 95 % -- 185 lb (83.9 kg)       Physical Exam    CONSTITUTIONAL: Awake and alert. Cooperative. Well-developed. Well-nourished.    Vitals:    07/26/22 0838   BP: 131/87   Pulse: 77   Resp: 18   Temp: 99 °F (37.2 °C) TempSrc: Oral   SpO2: 95%   Weight: 185 lb (83.9 kg)     HENT: Normocephalic. Atraumatic. External ears normal, without discharge. TMs clear bilaterally. Nonasal discharge. Oropharynx clear, no erythema. Mucous membranes moist.  EYES: Conjunctiva non-injected, nolid abnormalities noted. No scleral icterus. PERRL. EOM's grossly intact. Anterior chambers clear. NECK: Supple. Normal ROM. No meningismus. No thyroid tenderness or swelling noted. CARDIOVASCULAR: RRR. No Murmer. No carotid bruits. PULMONARY/CHEST WALL: Effort normal. No tachypnea. Lungs clear to ausculation. ABDOMEN: Normal BS. Soft. Nondistended. No tenderness to palpation. No guarding. No hernias noted. No splenomegaly. Back: Spine is midline. No ecchymosis. No crepituson palpation. No obvious subluxation of vertebral column. No saddle anesthesia or evidence of cauda equina. /ANORECTAL: Not assessed  MUSKULOSKELETAL: Normal ROM. No acute deformities. No edema. No tenderness to palpate. SKIN: Warm and dry. NEUROLOGICAL:  GCS 15. CN II-XII grossly intact. Strength is 5/5 in all extremities and sensation is intact. PSYCHIATRIC: Normal affect, normal insight and judgement. Alert and oriented x 3.         DIAGNOSTIC RESULTS:     LABS:    Results for orders placed or performed during the hospital encounter of 07/26/22   CBC with Auto Differential   Result Value Ref Range    WBC 15.5 (H) 4.0 - 11.0 K/uL    RBC 2.63 (L) 4.20 - 5.90 M/uL    Hemoglobin 8.7 (L) 13.5 - 17.5 g/dL    Hematocrit 24.5 (L) 40.5 - 52.5 %    MCV 93.1 80.0 - 100.0 fL    MCH 33.1 26.0 - 34.0 pg    MCHC 35.6 31.0 - 36.0 g/dL    RDW 22.8 (H) 12.4 - 15.4 %    Platelets 527 (H) 514 - 450 K/uL    MPV 7.8 5.0 - 10.5 fL    PLATELET SLIDE REVIEW Increased     SLIDE REVIEW see below     Path Consult Yes     Neutrophils % 75.2 %    Lymphocytes % 12.8 %    Monocytes % 10.4 %    Eosinophils % 0.7 %    Basophils % 0.9 %    Neutrophils Absolute 11.7 (H) 1.7 - 7.7 K/uL    Lymphocytes Absolute 2.0 1.0 - 5.1 K/uL    Monocytes Absolute 1.6 (H) 0.0 - 1.3 K/uL    Eosinophils Absolute 0.1 0.0 - 0.6 K/uL    Basophils Absolute 0.1 0.0 - 0.2 K/uL    Anisocytosis 1+ (A)    Comprehensive Metabolic Panel   Result Value Ref Range    Sodium 139 136 - 145 mmol/L    Potassium 3.8 3.5 - 5.1 mmol/L    Chloride 104 99 - 110 mmol/L    CO2 24 21 - 32 mmol/L    Anion Gap 11 3 - 16    Glucose 110 (H) 70 - 99 mg/dL    BUN 8 7 - 20 mg/dL    Creatinine 0.6 (L) 0.9 - 1.3 mg/dL    GFR Non-African American >60 >60    GFR African American >60 >60    Calcium 9.8 8.3 - 10.6 mg/dL    Total Protein 7.7 6.4 - 8.2 g/dL    Albumin 4.8 3.4 - 5.0 g/dL    Albumin/Globulin Ratio 1.7 1.1 - 2.2    Total Bilirubin 4.3 (H) 0.0 - 1.0 mg/dL    Alkaline Phosphatase 93 40 - 129 U/L    ALT 18 10 - 40 U/L    AST 40 (H) 15 - 37 U/L   Reticulocytes   Result Value Ref Range    Retic Ct Pct 15.95 (H) 0.50 - 2.18 %    Retic Ct Abs 0.419 M/uL    Immature Retic Fract 0.69 (H) 0.21 - 0.37   Sample possible blood bank testing   Result Value Ref Range    Specimen Status GIANLUCA    Blood Smear Review   Result Value Ref Range    Path Consult Reviewed          RADIOLOGY:  All x-ray studies are viewed/reviewed by me. Formal interpretations per the radiologist are as follows: No orders to display           EKG:  See EKG interpretation by an attending physician. PROCEDURES:   N/A    CRITICAL CARE TIME:   N/A    CONSULTS:  None      EMERGENCY DEPARTMENT COURSE andDIFFERENTIAL DIAGNOSIS/MDM:   Vitals:    Vitals:    07/26/22 0838   BP: 131/87   Pulse: 77   Resp: 18   Temp: 99 °F (37.2 °C)   TempSrc: Oral   SpO2: 95%   Weight: 185 lb (83.9 kg)       Patient wasgiven the following medications:  Medications - No data to display      Patient was evaluated independently by myself with the attending physician available for consultation. Patient presented to the emergency room today with complaints of back pain related to his sickle cell.   Patient denied any zuleyka, he is well-known to this department, he actually has a care plan. I reviewed care plan and initially ordered the patient some Toradol for his pain, when he was going to receive Toradol he requested to speak with me, when I went back to the room he questioned why he would not be getting Dilaudid, I showed him his care plan and told him that this is the plan that was developed for him which clearly states to try to avoid narcotics and give 1 dose of Toradol 50 mg for pain or Tylenol for his chronic pain and to avoid any narcotic prescriptions. Once this was communicated to the patient he told me that he just wanted to leave he did not care about his work-up he just wanted to go home and take his home pain medication. He did leave the department prior to his work-up being completed although on physical exam I saw no indications of an acute emergent medical condition. FINAL IMPRESSION:      1. Other chronic pain    2. Sickle cell disease without crisis ([facility] [address]          DISPOSITION/PLAN:   DISPOSITION Eloped - Left Before Treatment Complete      PATIENT REFERRED TO:  No follow-up provider specified.     DISCHARGE MEDICATIONS:  Discharge Medication List as of 7/26/2022 10:45 AM                     (Please note thatportions of this note were completed with a voice recognition program.  Efforts were made to edit the dictations, but occasionally words are mis-transcribed.)    CARMELITA Allison CNP-C (electronicallysigned)         CARMELITA Allison CNP  07/26/22 8357

## 2022-08-27 ENCOUNTER — APPOINTMENT (OUTPATIENT)
Dept: CT IMAGING | Age: 22
DRG: 812 | End: 2022-08-27
Payer: COMMERCIAL

## 2022-08-27 ENCOUNTER — HOSPITAL ENCOUNTER (INPATIENT)
Age: 22
LOS: 3 days | Discharge: HOME | DRG: 812 | End: 2022-08-30
Attending: EMERGENCY MEDICINE | Admitting: INTERNAL MEDICINE
Payer: COMMERCIAL

## 2022-08-27 ENCOUNTER — APPOINTMENT (OUTPATIENT)
Dept: GENERAL RADIOLOGY | Age: 22
DRG: 812 | End: 2022-08-27
Payer: COMMERCIAL

## 2022-08-27 DIAGNOSIS — D57.00 SICKLE CELL DISEASE WITH CRISIS (HCC): ICD-10-CM

## 2022-08-27 DIAGNOSIS — R07.9 CHEST PAIN, UNSPECIFIED TYPE: Primary | ICD-10-CM

## 2022-08-27 PROBLEM — J12.9 VIRAL PNEUMONIA: Status: ACTIVE | Noted: 2022-08-27

## 2022-08-27 LAB
A/G RATIO: 1.5 (ref 1.1–2.2)
ALBUMIN SERPL-MCNC: 4.6 G/DL (ref 3.4–5)
ALP BLD-CCNC: 93 U/L (ref 40–129)
ALT SERPL-CCNC: 29 U/L (ref 10–40)
ANION GAP SERPL CALCULATED.3IONS-SCNC: 10 MMOL/L (ref 3–16)
ANISOCYTOSIS: ABNORMAL
AST SERPL-CCNC: 38 U/L (ref 15–37)
ATYPICAL LYMPHOCYTE RELATIVE PERCENT: 1 % (ref 0–6)
BASOPHILS ABSOLUTE: 0 K/UL (ref 0–0.2)
BASOPHILS RELATIVE PERCENT: 0 %
BILIRUB SERPL-MCNC: 5.9 MG/DL (ref 0–1)
BUN BLDV-MCNC: 8 MG/DL (ref 7–20)
CALCIUM SERPL-MCNC: 9.1 MG/DL (ref 8.3–10.6)
CHLORIDE BLD-SCNC: 103 MMOL/L (ref 99–110)
CO2: 24 MMOL/L (ref 21–32)
CREAT SERPL-MCNC: 0.6 MG/DL (ref 0.9–1.3)
D DIMER: 0.96 UG/ML FEU (ref 0–0.6)
EKG ATRIAL RATE: 85 BPM
EKG DIAGNOSIS: NORMAL
EKG P AXIS: 68 DEGREES
EKG P-R INTERVAL: 160 MS
EKG Q-T INTERVAL: 360 MS
EKG QRS DURATION: 102 MS
EKG QTC CALCULATION (BAZETT): 428 MS
EKG R AXIS: 84 DEGREES
EKG T AXIS: -45 DEGREES
EKG VENTRICULAR RATE: 85 BPM
EOSINOPHILS ABSOLUTE: 0.2 K/UL (ref 0–0.6)
EOSINOPHILS RELATIVE PERCENT: 1 %
GFR AFRICAN AMERICAN: >60
GFR NON-AFRICAN AMERICAN: >60
GLUCOSE BLD-MCNC: 106 MG/DL (ref 70–99)
HCT VFR BLD CALC: 21.1 % (ref 40.5–52.5)
HEMATOLOGY PATH CONSULT: NO
HEMOGLOBIN: 7.6 G/DL (ref 13.5–17.5)
LYMPHOCYTES ABSOLUTE: 3.5 K/UL (ref 1–5.1)
LYMPHOCYTES RELATIVE PERCENT: 19 %
MACROCYTES: ABNORMAL
MCH RBC QN AUTO: 33.4 PG (ref 26–34)
MCHC RBC AUTO-ENTMCNC: 35.7 G/DL (ref 31–36)
MCV RBC AUTO: 93.5 FL (ref 80–100)
MICROCYTES: ABNORMAL
MONOCYTES ABSOLUTE: 1.9 K/UL (ref 0–1.3)
MONOCYTES RELATIVE PERCENT: 11 %
NEUTROPHILS ABSOLUTE: 12 K/UL (ref 1.7–7.7)
NEUTROPHILS RELATIVE PERCENT: 68 %
NUCLEATED RED BLOOD CELLS: 1 /100 WBC
OVALOCYTES: ABNORMAL
PDW BLD-RTO: 22.3 % (ref 12.4–15.4)
PLATELET # BLD: 424 K/UL (ref 135–450)
PLATELET SLIDE REVIEW: ADEQUATE
PMV BLD AUTO: 7.9 FL (ref 5–10.5)
POIKILOCYTES: ABNORMAL
POLYCHROMASIA: ABNORMAL
POTASSIUM SERPL-SCNC: 3.9 MMOL/L (ref 3.5–5.1)
PROCALCITONIN: 0.09 NG/ML (ref 0–0.15)
RBC # BLD: 2.26 M/UL (ref 4.2–5.9)
SARS-COV-2, NAAT: NOT DETECTED
SLIDE REVIEW: ABNORMAL
SODIUM BLD-SCNC: 137 MMOL/L (ref 136–145)
STOMATOCYTES: ABNORMAL
TOTAL PROTEIN: 7.7 G/DL (ref 6.4–8.2)
TROPONIN: <0.01 NG/ML
WBC # BLD: 17.6 K/UL (ref 4–11)

## 2022-08-27 PROCEDURE — 99285 EMERGENCY DEPT VISIT HI MDM: CPT

## 2022-08-27 PROCEDURE — 6370000000 HC RX 637 (ALT 250 FOR IP): Performed by: EMERGENCY MEDICINE

## 2022-08-27 PROCEDURE — 6370000000 HC RX 637 (ALT 250 FOR IP): Performed by: INTERNAL MEDICINE

## 2022-08-27 PROCEDURE — 80053 COMPREHEN METABOLIC PANEL: CPT

## 2022-08-27 PROCEDURE — 71260 CT THORAX DX C+: CPT | Performed by: EMERGENCY MEDICINE

## 2022-08-27 PROCEDURE — 6360000002 HC RX W HCPCS: Performed by: EMERGENCY MEDICINE

## 2022-08-27 PROCEDURE — 84484 ASSAY OF TROPONIN QUANT: CPT

## 2022-08-27 PROCEDURE — 2580000003 HC RX 258: Performed by: INTERNAL MEDICINE

## 2022-08-27 PROCEDURE — 87635 SARS-COV-2 COVID-19 AMP PRB: CPT

## 2022-08-27 PROCEDURE — 6360000004 HC RX CONTRAST MEDICATION: Performed by: EMERGENCY MEDICINE

## 2022-08-27 PROCEDURE — 96376 TX/PRO/DX INJ SAME DRUG ADON: CPT

## 2022-08-27 PROCEDURE — 85379 FIBRIN DEGRADATION QUANT: CPT

## 2022-08-27 PROCEDURE — 96374 THER/PROPH/DIAG INJ IV PUSH: CPT

## 2022-08-27 PROCEDURE — 93005 ELECTROCARDIOGRAM TRACING: CPT | Performed by: EMERGENCY MEDICINE

## 2022-08-27 PROCEDURE — 85025 COMPLETE CBC W/AUTO DIFF WBC: CPT

## 2022-08-27 PROCEDURE — 84145 PROCALCITONIN (PCT): CPT

## 2022-08-27 PROCEDURE — 93010 ELECTROCARDIOGRAM REPORT: CPT | Performed by: INTERNAL MEDICINE

## 2022-08-27 PROCEDURE — 71046 X-RAY EXAM CHEST 2 VIEWS: CPT

## 2022-08-27 PROCEDURE — 6360000002 HC RX W HCPCS

## 2022-08-27 PROCEDURE — 1200000000 HC SEMI PRIVATE

## 2022-08-27 PROCEDURE — 2580000003 HC RX 258: Performed by: EMERGENCY MEDICINE

## 2022-08-27 PROCEDURE — 6360000002 HC RX W HCPCS: Performed by: INTERNAL MEDICINE

## 2022-08-27 RX ORDER — HEPARIN SODIUM 1000 [USP'U]/ML
4000 INJECTION, SOLUTION INTRAVENOUS; SUBCUTANEOUS ONCE
Status: DISCONTINUED | OUTPATIENT
Start: 2022-08-27 | End: 2022-08-28

## 2022-08-27 RX ORDER — PSEUDOEPHEDRINE HCL 30 MG
100 TABLET ORAL
Status: CANCELLED | OUTPATIENT
Start: 2022-08-27

## 2022-08-27 RX ORDER — HEPARIN SODIUM 1000 [USP'U]/ML
4000 INJECTION, SOLUTION INTRAVENOUS; SUBCUTANEOUS PRN
Status: DISCONTINUED | OUTPATIENT
Start: 2022-08-27 | End: 2022-08-28

## 2022-08-27 RX ORDER — ONDANSETRON 4 MG/1
4 TABLET, ORALLY DISINTEGRATING ORAL EVERY 8 HOURS PRN
Status: DISCONTINUED | OUTPATIENT
Start: 2022-08-27 | End: 2022-08-30 | Stop reason: HOSPADM

## 2022-08-27 RX ORDER — ASPIRIN 81 MG/1
81 TABLET ORAL DAILY
Status: DISCONTINUED | OUTPATIENT
Start: 2022-08-28 | End: 2022-08-30 | Stop reason: HOSPADM

## 2022-08-27 RX ORDER — 0.9 % SODIUM CHLORIDE 0.9 %
1000 INTRAVENOUS SOLUTION INTRAVENOUS ONCE
Status: COMPLETED | OUTPATIENT
Start: 2022-08-27 | End: 2022-08-27

## 2022-08-27 RX ORDER — ALBUTEROL SULFATE 90 UG/1
2 AEROSOL, METERED RESPIRATORY (INHALATION) EVERY 4 HOURS PRN
Status: DISCONTINUED | OUTPATIENT
Start: 2022-08-28 | End: 2022-08-30 | Stop reason: HOSPADM

## 2022-08-27 RX ORDER — FOLIC ACID 1 MG/1
2 TABLET ORAL DAILY
Status: DISCONTINUED | OUTPATIENT
Start: 2022-08-28 | End: 2022-08-30 | Stop reason: HOSPADM

## 2022-08-27 RX ORDER — ALBUTEROL SULFATE 90 UG/1
2 AEROSOL, METERED RESPIRATORY (INHALATION) 4 TIMES DAILY
Status: DISCONTINUED | OUTPATIENT
Start: 2022-08-27 | End: 2022-08-27

## 2022-08-27 RX ORDER — OXYCODONE HYDROCHLORIDE 5 MG/1
10 TABLET ORAL ONCE
Status: COMPLETED | OUTPATIENT
Start: 2022-08-27 | End: 2022-08-27

## 2022-08-27 RX ORDER — KETOROLAC TROMETHAMINE 30 MG/ML
30 INJECTION, SOLUTION INTRAMUSCULAR; INTRAVENOUS EVERY 6 HOURS PRN
Status: CANCELLED | OUTPATIENT
Start: 2022-08-27 | End: 2022-09-01

## 2022-08-27 RX ORDER — AZITHROMYCIN 250 MG/1
500 TABLET, FILM COATED ORAL EVERY 24 HOURS
Status: COMPLETED | OUTPATIENT
Start: 2022-08-27 | End: 2022-08-29

## 2022-08-27 RX ORDER — HEPARIN SODIUM 10000 [USP'U]/100ML
1000 INJECTION, SOLUTION INTRAVENOUS CONTINUOUS
Status: DISCONTINUED | OUTPATIENT
Start: 2022-08-27 | End: 2022-08-28

## 2022-08-27 RX ORDER — HEPARIN SODIUM 1000 [USP'U]/ML
2000 INJECTION, SOLUTION INTRAVENOUS; SUBCUTANEOUS PRN
Status: DISCONTINUED | OUTPATIENT
Start: 2022-08-27 | End: 2022-08-28

## 2022-08-27 RX ORDER — DOCUSATE SODIUM 100 MG/1
100 CAPSULE, LIQUID FILLED ORAL DAILY
Status: DISCONTINUED | OUTPATIENT
Start: 2022-08-28 | End: 2022-08-30 | Stop reason: HOSPADM

## 2022-08-27 RX ORDER — SODIUM CHLORIDE 9 MG/ML
INJECTION, SOLUTION INTRAVENOUS PRN
Status: DISCONTINUED | OUTPATIENT
Start: 2022-08-27 | End: 2022-08-30 | Stop reason: HOSPADM

## 2022-08-27 RX ORDER — SODIUM CHLORIDE 0.9 % (FLUSH) 0.9 %
5-40 SYRINGE (ML) INJECTION EVERY 12 HOURS SCHEDULED
Status: DISCONTINUED | OUTPATIENT
Start: 2022-08-27 | End: 2022-08-30 | Stop reason: HOSPADM

## 2022-08-27 RX ORDER — OXYCODONE HYDROCHLORIDE 5 MG/1
10 TABLET ORAL EVERY 4 HOURS PRN
Status: CANCELLED | OUTPATIENT
Start: 2022-08-27

## 2022-08-27 RX ORDER — ONDANSETRON 2 MG/ML
4 INJECTION INTRAMUSCULAR; INTRAVENOUS EVERY 6 HOURS PRN
Status: DISCONTINUED | OUTPATIENT
Start: 2022-08-27 | End: 2022-08-30 | Stop reason: HOSPADM

## 2022-08-27 RX ORDER — ASPIRIN 81 MG/1
324 TABLET, CHEWABLE ORAL ONCE
Status: COMPLETED | OUTPATIENT
Start: 2022-08-27 | End: 2022-08-27

## 2022-08-27 RX ORDER — KETOROLAC TROMETHAMINE 30 MG/ML
30 INJECTION, SOLUTION INTRAMUSCULAR; INTRAVENOUS EVERY 6 HOURS PRN
Status: DISCONTINUED | OUTPATIENT
Start: 2022-08-27 | End: 2022-08-30 | Stop reason: HOSPADM

## 2022-08-27 RX ORDER — ACETAMINOPHEN 650 MG/1
650 SUPPOSITORY RECTAL EVERY 6 HOURS PRN
Status: DISCONTINUED | OUTPATIENT
Start: 2022-08-27 | End: 2022-08-30 | Stop reason: HOSPADM

## 2022-08-27 RX ORDER — POLYETHYLENE GLYCOL 3350 17 G/17G
17 POWDER, FOR SOLUTION ORAL DAILY PRN
Status: DISCONTINUED | OUTPATIENT
Start: 2022-08-27 | End: 2022-08-30 | Stop reason: HOSPADM

## 2022-08-27 RX ORDER — PANTOPRAZOLE SODIUM 40 MG/1
40 TABLET, DELAYED RELEASE ORAL
Status: DISCONTINUED | OUTPATIENT
Start: 2022-08-28 | End: 2022-08-30 | Stop reason: HOSPADM

## 2022-08-27 RX ORDER — OXYCODONE HYDROCHLORIDE 5 MG/1
10 TABLET ORAL EVERY 4 HOURS PRN
Status: DISCONTINUED | OUTPATIENT
Start: 2022-08-27 | End: 2022-08-28

## 2022-08-27 RX ORDER — ACETAMINOPHEN 325 MG/1
650 TABLET ORAL EVERY 6 HOURS PRN
Status: DISCONTINUED | OUTPATIENT
Start: 2022-08-27 | End: 2022-08-30 | Stop reason: HOSPADM

## 2022-08-27 RX ORDER — SODIUM CHLORIDE 9 MG/ML
INJECTION, SOLUTION INTRAVENOUS CONTINUOUS
Status: ACTIVE | OUTPATIENT
Start: 2022-08-27 | End: 2022-08-28

## 2022-08-27 RX ORDER — SODIUM CHLORIDE 0.9 % (FLUSH) 0.9 %
5-40 SYRINGE (ML) INJECTION PRN
Status: DISCONTINUED | OUTPATIENT
Start: 2022-08-27 | End: 2022-08-30 | Stop reason: HOSPADM

## 2022-08-27 RX ADMIN — OXYCODONE 10 MG: 5 TABLET ORAL at 22:05

## 2022-08-27 RX ADMIN — AZITHROMYCIN MONOHYDRATE 500 MG: 250 TABLET ORAL at 23:34

## 2022-08-27 RX ADMIN — SODIUM CHLORIDE 1000 ML: 9 INJECTION, SOLUTION INTRAVENOUS at 14:48

## 2022-08-27 RX ADMIN — Medication 1 MG: at 16:17

## 2022-08-27 RX ADMIN — HYDROMORPHONE HYDROCHLORIDE 1 MG: 1 INJECTION, SOLUTION INTRAMUSCULAR; INTRAVENOUS; SUBCUTANEOUS at 18:54

## 2022-08-27 RX ADMIN — CEFTRIAXONE SODIUM 1000 MG: 1 INJECTION, POWDER, FOR SOLUTION INTRAMUSCULAR; INTRAVENOUS at 23:34

## 2022-08-27 RX ADMIN — SODIUM CHLORIDE: 9 INJECTION, SOLUTION INTRAVENOUS at 23:30

## 2022-08-27 RX ADMIN — ASPIRIN 324 MG: 81 TABLET, CHEWABLE ORAL at 14:49

## 2022-08-27 RX ADMIN — IOPAMIDOL 75 ML: 755 INJECTION, SOLUTION INTRAVENOUS at 19:30

## 2022-08-27 RX ADMIN — SODIUM CHLORIDE, PRESERVATIVE FREE 10 ML: 5 INJECTION INTRAVENOUS at 23:31

## 2022-08-27 RX ADMIN — HYDROMORPHONE HYDROCHLORIDE 1 MG: 1 INJECTION, SOLUTION INTRAMUSCULAR; INTRAVENOUS; SUBCUTANEOUS at 16:17

## 2022-08-27 RX ADMIN — HYDROMORPHONE HYDROCHLORIDE 1 MG: 1 INJECTION, SOLUTION INTRAMUSCULAR; INTRAVENOUS; SUBCUTANEOUS at 14:50

## 2022-08-27 ASSESSMENT — PAIN SCALES - GENERAL
PAINLEVEL_OUTOF10: 8

## 2022-08-27 ASSESSMENT — PAIN DESCRIPTION - LOCATION: LOCATION: CHEST

## 2022-08-27 ASSESSMENT — PAIN - FUNCTIONAL ASSESSMENT
PAIN_FUNCTIONAL_ASSESSMENT: 0-10
PAIN_FUNCTIONAL_ASSESSMENT: 0-10

## 2022-08-27 ASSESSMENT — PAIN DESCRIPTION - PAIN TYPE: TYPE: ACUTE PAIN

## 2022-08-27 NOTE — ED PROVIDER NOTES
[facility] B3 - MED SURG      CHIEF COMPLAINT  Fever, Shortness of Breath, and Chest Pain (All sx since Tuesday)       HISTORY OF PRESENT ILLNESS  Niko Gonzalez is a 20 y.o. male with history of sickle cell disease who presents to the emergency department for evaluation of fever, chest pain, shortness of breath since Tuesday. Patient reports he got home from work and started having muscle aches. Says he started having low-grade temperatures starting Wednesday. T-max was 100.3. Denies having associated cough, runny nose. Denies any known sick contacts. Says he tried Percocet at home but continuing to have diffuse body aches. Says it feels more like sickle cell crisis than it does muscle aches that you would get with a cold such as the flu. Has not noticed any leg swellings. No other complaints, modifying factors or associated symptoms. I have reviewed the following from the nursing documentation.     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)     Sickle cell anemia (HCC)      Past Surgical History:   Procedure Laterality Date    SPLENECTOMY       Family History   Problem Relation Age of Onset    Other Father         sarcoidosis     Social History     Socioeconomic History    Marital status: Single     Spouse name: Not on file    Number of children: 0    Years of education: Not on file    Highest education level: Not on file   Occupational History    Not on file   Tobacco Use    Smoking status: Never    Smokeless tobacco: Never   Vaping Use    Vaping Use: Never used   Substance and Sexual Activity    Alcohol use: Not Currently    Drug use: Never    Sexual activity: Not Currently   Other Topics Concern    Not on file   Social History Narrative    Not on file     Social Determinants of Health     Financial Resource Strain: Not on file   Food Insecurity: Not on file   Transportation Needs: Not on file   Physical Activity: Not on file   Stress: Not on file   Social Connections: Not on file   Intimate Partner Violence: Not on file   Housing Stability: Not on file     Current Facility-Administered Medications   Medication Dose Route Frequency Provider Last Rate Last Admin    [START ON 8/29/2022] enoxaparin (LOVENOX) injection 80 mg  1 mg/kg SubCUTAneous BID Jimmy Miller MD        oxyCODONE (ROXICODONE) immediate release tablet 10 mg  10 mg Oral Q4H PRN Jimmy Miller MD   10 mg at 08/28/22 6867    Or    oxyCODONE (ROXICODONE) immediate release tablet 20 mg  20 mg Oral Q4H PRN Jimmy Miller MD   20 mg at 08/28/22 1242    0.9 % sodium chloride infusion   IntraVENous PRN Cely Sargent MD        0.9 % sodium chloride infusion   IntraVENous PRN Gene Ho MD        HYDROmorphone (DILAUDID) injection 1 mg  1 mg IntraVENous Q4H PRN Pinky Alvarez MD   1 mg at 08/28/22 1033    docusate sodium (COLACE) capsule 100 mg  100 mg Oral Daily Jimmy Miller MD        folic acid (FOLVITE) tablet 2 mg  2 mg Oral Daily Jimmy Miller MD        ketorolac (TORADOL) injection 30 mg  30 mg IntraVENous Q6H PRN Jimmy Miller MD        pantoprazole (PROTONIX) tablet 40 mg  40 mg Oral QAM AC Jimmy Miller MD   40 mg at 08/28/22 0558    0.9 % sodium chloride infusion   IntraVENous Continuous Jimmy Miller  mL/hr at 08/27/22 2330 New Bag at 08/27/22 2330    sodium chloride flush 0.9 % injection 5-40 mL  5-40 mL IntraVENous 2 times per day Jimmy Miller MD   10 mL at 08/27/22 2331    sodium chloride flush 0.9 % injection 5-40 mL  5-40 mL IntraVENous PRN Jimmy Miller MD        0.9 % sodium chloride infusion   IntraVENous PRN Jimmy Miller MD        ondansetron (ZOFRAN-ODT) disintegrating tablet 4 mg  4 mg Oral Q8H PRN Jimmy Miller MD        Or    [facility]ron [facility]) injection 4 mg  4 mg IntraVENous Q6H PRN Venkatesh Mattson MD        polyethylene glycol [facility]) packet 17 g  17 g Oral Daily PRN Venkatesh Mattson MD        acetaminophen (TYLENOL) tablet 650 mg  650 mg Oral Q6H PRN Venkatesh Mattson MD        Or    acetaminophen (TYLENOL) suppository 650 mg  650 mg Rectal Q6H PRN Venkatesh Mattson MD        cefTRIAXone (ROCEPHIN) 1,000 mg in dextrose 5 % 50 mL IVPB mini-bag  1,000 mg IntraVENous Q24H Venkatesh Mattson MD   Stopped at 08/28/22 0014    And    azithromycin (ZITHROMAX) tablet 500 mg  500 mg Oral Q24H Venkatesh Mattson MD   500 mg at 08/27/22 2334    aspirin EC tablet 81 mg  81 mg Oral Daily Venkatesh Mattson MD        albuterol sulfate HFA (PROVENTIL;VENTOLIN;PROAIR) 108 (90 Base) MCG/ACT inhaler 2 puff  2 puff Inhalation Q4H PRN Venkatesh Mattson MD         Allergies   Allergen Reactions    Morphine Shortness Of Breath     Other reaction(s): Histamine-like Reaction  Has asthma exacerbation with morphine-histamine type reaction         PMH, Surgical Hx, FH, Social Hx reviewed by myself. REVIEW OF SYSTEMS  10 systems reviewed, pertinent positives per HPI otherwise noted to be negative. PHYSICAL EXAM  BP (!) 104/51   Pulse 82   Temp 98.6 °F (37 °C) (Oral)   Resp 16   Ht 5' 9\" (1.753 m)   Wt 193 lb 14.4 oz (88 kg)   SpO2 93%   BMI 28.63 kg/m²    GENERAL APPEARANCE: Awake and alert. In moderate distress due to pain. HENT: Normocephalic. Atraumatic. EOMI. No facial droop. HEART/CHEST: RRR. LUNGS: Respirations unlabored. Speaking comfortably in full sentences. ABDOMEN: Soft, non-distended abdomen. Non tender to palpation. No guarding. No rebound. EXTREMITIES: No gross deformities. Moving all extremities. No significant lower extremity edema. Negative keegan sign bilaterally. SKIN: Warm and dry. No acute rashes. NEUROLOGICAL: Alert and oriented. No gross facial drooping. Answering questions appropriately.  Moving all extremities. PSYCHIATRIC: Pleasant. Normal mood and affect.     LABS  Results for orders placed or performed during the hospital encounter of 08/27/22   COVID-19, Rapid    Specimen: Nasopharyngeal Swab   Result Value Ref Range    SARS-CoV-2, NAAT Not Detected Not Detected   Comprehensive Metabolic Panel   Result Value Ref Range    Sodium 137 136 - 145 mmol/L    Potassium 3.9 3.5 - 5.1 mmol/L    Chloride 103 99 - 110 mmol/L    CO2 24 21 - 32 mmol/L    Anion Gap 10 3 - 16    Glucose 106 (H) 70 - 99 mg/dL    BUN 8 7 - 20 mg/dL    Creatinine 0.6 (L) 0.9 - 1.3 mg/dL    GFR Non-African American >60 >60    GFR African American >60 >60    Calcium 9.1 8.3 - 10.6 mg/dL    Total Protein 7.7 6.4 - 8.2 g/dL    Albumin 4.6 3.4 - 5.0 g/dL    Albumin/Globulin Ratio 1.5 1.1 - 2.2    Total Bilirubin 5.9 (H) 0.0 - 1.0 mg/dL    Alkaline Phosphatase 93 40 - 129 U/L    ALT 29 10 - 40 U/L    AST 38 (H) 15 - 37 U/L   CBC with Auto Differential   Result Value Ref Range    WBC 17.6 (H) 4.0 - 11.0 K/uL    RBC 2.26 (L) 4.20 - 5.90 M/uL    Hemoglobin 7.6 (L) 13.5 - 17.5 g/dL    Hematocrit 21.1 (L) 40.5 - 52.5 %    MCV 93.5 80.0 - 100.0 fL    MCH 33.4 26.0 - 34.0 pg    MCHC 35.7 31.0 - 36.0 g/dL    RDW 22.3 (H) 12.4 - 15.4 %    Platelets 099 500 - 878 K/uL    MPV 7.9 5.0 - 10.5 fL    PLATELET SLIDE REVIEW Adequate     SLIDE REVIEW see below     Path Consult No     Neutrophils % 68.0 %    Lymphocytes % 19.0 %    Monocytes % 11.0 %    Eosinophils % 1.0 %    Basophils % 0.0 %    Neutrophils Absolute 12.0 (H) 1.7 - 7.7 K/uL    Lymphocytes Absolute 3.5 1.0 - 5.1 K/uL    Monocytes Absolute 1.9 (H) 0.0 - 1.3 K/uL    Eosinophils Absolute 0.2 0.0 - 0.6 K/uL    Basophils Absolute 0.0 0.0 - 0.2 K/uL    Atypical Lymphocytes Relative 1 0 - 6 %    nRBC 1 (A) /100 WBC    Anisocytosis 1+ (A)     Macrocytes Occasional (A)     Microcytes Occasional (A)     Polychromasia Occasional (A)     Poikilocytes 1+ (A)     Ovalocytes Occasional (A)     Stomatocytes Occasional (A)    Troponin   Result Value Ref Range    Troponin <0.01 <0.01 ng/mL   D-Dimer, Quantitative   Result Value Ref Range    D-Dimer, Quant 0.96 (H) 0.00 - 0.60 ug/mL FEU   Procalcitonin   Result Value Ref Range    Procalcitonin 0.09 0.00 - 0.15 ng/mL   CBC auto differential   Result Value Ref Range    WBC 14.0 (H) 4.0 - 11.0 K/uL    RBC 2.12 (L) 4.20 - 5.90 M/uL    Hemoglobin 7.2 (L) 13.5 - 17.5 g/dL    Hematocrit 19.8 (LL) 40.5 - 52.5 %    MCV 93.5 80.0 - 100.0 fL    MCH 34.0 26.0 - 34.0 pg    MCHC 36.3 (H) 31.0 - 36.0 g/dL    RDW 22.3 (H) 12.4 - 15.4 %    Platelets 879 497 - 166 K/uL    MPV 7.8 5.0 - 10.5 fL    Neutrophils % 73.0 %    Lymphocytes % 13.0 %    Monocytes % 14.0 %    Eosinophils % 0.0 %    Basophils % 0.0 %    Neutrophils Absolute 10.2 (H) 1.7 - 7.7 K/uL    Lymphocytes Absolute 1.8 1.0 - 5.1 K/uL    Monocytes Absolute 2.0 (H) 0.0 - 1.3 K/uL    Eosinophils Absolute 0.0 0.0 - 0.6 K/uL    Basophils Absolute 0.0 0.0 - 0.2 K/uL    nRBC 2 (A) /100 WBC    Polychromasia 1+ (A)     Ovalocytes 2+ (A)     Target Cells Occasional (A)     Conteh-Jolly Bodies Occasional (A)     Sickle Cells 3+ (A)    Troponin   Result Value Ref Range    Troponin <0.01 <0.01 ng/mL   APTT   Result Value Ref Range    aPTT 30.8 23.0 - 34.3 sec   Protime-INR   Result Value Ref Range    Protime 15.5 (H) 11.7 - 14.5 sec    INR 1.24 (H) 0.87 - 1.14   Procalcitonin   Result Value Ref Range    Procalcitonin 0.07 0.00 - 0.15 ng/mL   Troponin   Result Value Ref Range    Troponin <0.01 <0.01 ng/mL   Lactate Dehydrogenase   Result Value Ref Range     (H) 100 - 190 U/L   Reticulocytes   Result Value Ref Range    Retic Ct Pct 12.65 (H) 0.50 - 2.18 %    Retic Ct Abs 0.268 M/uL    Immature Retic Fract 0.62 (H) 0.21 - 0.37   EKG 12 Lead   Result Value Ref Range    Ventricular Rate 85 BPM    Atrial Rate 85 BPM    P-R Interval 160 ms    QRS Duration 102 ms    Q-T Interval 360 ms    QTc Calculation (Bazett) 428 ms    P Axis 68 degrees R Axis 84 degrees    T Axis -45 degrees    Diagnosis       Normal sinus rhythmMinimal voltage criteria for LVH, may be normal variantT wave abnormality, consider inferolateral ischemiaAbnormal ECGConfirmed by Jan Schulte ([id number]) on 8/27/2022 7:52:24 PM   EKG 12 Lead   Result Value Ref Range    Ventricular Rate 79 BPM    Atrial Rate 79 BPM    P-R Interval 148 ms    QRS Duration 104 ms    Q-T Interval 388 ms    QTc Calculation (Bazett) 444 ms    P Axis 32 degrees    R Axis 79 degrees    T Axis -11 degrees    Diagnosis       Normal sinus rhythm with sinus arrhythmiaMinimal voltage criteria for LVH, may be normal variantNonspecific T wave abnormalityAbnormal ECGWhen compared with ECG of 27-AUG-2022 14:03,No significant change was found   TYPE AND SCREEN   Result Value Ref Range    ABO/Rh B POS     Antibody Screen NEG        I have reviewed all labs for this visit. ECG  The Ekg interpreted by me shows  Normal sinus rhythm with a rate of 85  Minimal voltage criteria for LVH, may be normal variant  QTc is acceptable  ST Segments: T wave abnormality, consider inferior lateral ischemia    RADIOLOGY  XR CHEST (2 VW)    Result Date: 8/27/2022  EXAMINATION: TWO XRAY VIEWS OF THE CHEST 8/27/2022 2:32 pm COMPARISON: 05/09/2022 HISTORY: ORDERING SYSTEM PROVIDED HISTORY: pain TECHNOLOGIST PROVIDED HISTORY: Reason for exam:->pain Reason for Exam: fever, chest pain and SOB for the past 5 days FINDINGS: The cardiac silhouette, mediastinal and hilar contours are normal.  The lungs are clear. There are no pleural effusions. No acute osseous abnormalities are identified. No acute cardiopulmonary disease. CT CHEST PULMONARY EMBOLISM W CONTRAST    Result Date: 8/27/2022  EXAMINATION: CTA OF THE CHEST 8/27/2022 4:20 pm TECHNIQUE: CTA of the chest was performed after the administration of intravenous contrast.  Multiplanar reformatted images are provided for review. MIP images are provided for review.  Automated exposure control, iterative reconstruction, and/or weight based adjustment of the mA/kV was utilized to reduce the radiation dose to as low as reasonably achievable. COMPARISON: Radiographs 08/27/2022 and CT 05/02/2022. HISTORY: ORDERING SYSTEM PROVIDED HISTORY: chest pain TECHNOLOGIST PROVIDED HISTORY: Reason for exam:->chest pain Decision Support Exception - unselect if not a suspected or confirmed emergency medical condition->Emergency Medical Condition (MA) Reason for Exam: chest pain-sob since tuesday-fever FINDINGS: Pulmonary Arteries: Pulmonary arteries are adequately opacified for evaluation. No evidence of intraluminal filling defect to suggest pulmonary embolism. Main pulmonary artery is normal in caliber. Mediastinum: There is no lymphadenopathy. Mild cardiomegaly appears unchanged. There is no pericardial effusion. The Lungs/pleura: There are mild patchy peripheral ground-glass opacities in the lower lobes. The central airways are normally patent. There is no pleural effusion. Upper Abdomen: Limited images of the upper abdomen are unremarkable. Soft Tissues/Bones: No fracture or destructive bone lesion is identified. Gynecomastia is again noted. 1. No pulmonary embolism. 2. Patchy peripheral ground-glass opacities in the lower lobes which could reflect atelectasis, pneumonitis or viral pneumonia. 3. Stable cardiomegaly. RECOMMENDATIONS: Unavailable        ED COURSE/MDM  Patient seen and evaluated. At presentation, patient was awake, alert, afebrile, hemodynamically stable, and satting well on room air. Patient appears uncomfortable. He was placed on a cardiac monitor for close observation. Differential diagnosis includes sickle cell crisis, COVID, PE, ACS, others. Troponin negative. Given the duration of symptoms, serial troponins not indicated at this time. D-dimer is elevated. Will obtain a CT PE. He was given Dilaudid as needed for pain.   Patient continuing to have severe pain despite multiple doses of Dilaudid. Hospitalist consulted for admission for further evaluation and treatment. Admit. Is this patient to be included in the SEP-1 Core Measure due to severe sepsis or septic shock? No   Exclusion criteria - the patient is NOT to be included for SEP-1 Core Measure due to:  2+ SIRS criteria are not met     Pt was seen during the COVID 19 pandemic. Appropriate PPE worn by ME during patient encounters. Patient was cared for during a time with constrained hospital bed capacity with nationwide stress on resources and staffing.       During the patient's ED course, the patient was given:  Medications   docusate sodium (COLACE) capsule 100 mg (100 mg Oral Not Given 4/22/49 9435)   folic acid (FOLVITE) tablet 2 mg (2 mg Oral Not Given 8/28/22 1023)   ketorolac (TORADOL) injection 30 mg (has no administration in time range)   pantoprazole (PROTONIX) tablet 40 mg (40 mg Oral Given 8/28/22 0558)   0.9 % sodium chloride infusion ( IntraVENous New Bag 8/27/22 2330)   sodium chloride flush 0.9 % injection 5-40 mL (5 mLs IntraVENous Not Given 8/28/22 1023)   sodium chloride flush 0.9 % injection 5-40 mL (has no administration in time range)   0.9 % sodium chloride infusion (has no administration in time range)   ondansetron (ZOFRAN-ODT) disintegrating tablet 4 mg (has no administration in time range)     Or   ondansetron (ZOFRAN) injection 4 mg (has no administration in time range)   polyethylene glycol (GLYCOLAX) packet 17 g (has no administration in time range)   acetaminophen (TYLENOL) tablet 650 mg (has no administration in time range)     Or   acetaminophen (TYLENOL) suppository 650 mg (has no administration in time range)   cefTRIAXone (ROCEPHIN) 1,000 mg in dextrose 5 % 50 mL IVPB mini-bag (0 mg IntraVENous Stopped 8/28/22 0014)     And   azithromycin (ZITHROMAX) tablet 500 mg (500 mg Oral Given 8/27/22 2334)   aspirin EC tablet 81 mg (81 mg Oral Not Given 8/28/22 1022)   albuterol sulfate HFA (PROVENTIL;VENTOLIN;PROAIR) 108 (90 Base) MCG/ACT inhaler 2 puff (has no administration in time range)   enoxaparin (LOVENOX) injection 80 mg (has no administration in time range)   oxyCODONE (ROXICODONE) immediate release tablet 10 mg ( Oral See Alternative 8/28/22 1242)     Or   oxyCODONE (ROXICODONE) immediate release tablet 20 mg (20 mg Oral Given 8/28/22 1242)   0.9 % sodium chloride infusion (has no administration in time range)   0.9 % sodium chloride infusion (has no administration in time range)   HYDROmorphone (DILAUDID) injection 1 mg (1 mg IntraVENous Given 8/28/22 1033)   aspirin chewable tablet 324 mg (324 mg Oral Given 8/27/22 1449)   HYDROmorphone (DILAUDID) injection 1 mg (1 mg IntraVENous Given 8/27/22 1450)   0.9 % sodium chloride bolus (0 mLs IntraVENous Stopped 8/27/22 1538)   HYDROmorphone (DILAUDID) injection 1 mg (1 mg IntraVENous Given 8/27/22 1617)   HYDROmorphone (DILAUDID) injection 1 mg (1 mg IntraVENous Given 8/27/22 1854)   iopamidol (ISOVUE-370) 76 % injection 75 mL (75 mLs IntraVENous Given 8/27/22 1930)   oxyCODONE (ROXICODONE) immediate release tablet 10 mg (10 mg Oral Given 8/27/22 2205)        CLINICAL IMPRESSION  1. Chest pain, unspecified type    2. [facility]        Blood pressure (!) 104/51, pulse 82, temperature 98.6 °F (37 °C), temperature source Oral, resp. rate 16, height 5' 9\" (1.753 m), weight 193 lb 14.4 oz (88 kg), SpO2 93 %. 12 [facility] was admitted to the hospital.     Patient was given scripts for the following medications. I counseled patient how to take these medications. Current Discharge Medication List          Follow-up with:  No follow-up provider specified. DISCLAIMER: This chart was created using Dragon dictation software. Efforts were made by me to ensure accuracy, however some errors may be present due to limitations of this technology and occasionally words are not transcribed correctly. Arcelia Pathak MD  08/28/22 2810

## 2022-08-28 PROBLEM — R07.9 CHEST PAIN: Status: ACTIVE | Noted: 2022-08-27

## 2022-08-28 PROBLEM — R94.31 ABNORMAL EKG: Status: ACTIVE | Noted: 2022-08-28

## 2022-08-28 LAB
ABO/RH: NORMAL
ANTIBODY SCREEN: NORMAL
APTT: 30.8 SEC (ref 23–34.3)
EKG ATRIAL RATE: 79 BPM
EKG DIAGNOSIS: NORMAL
EKG P AXIS: 32 DEGREES
EKG P-R INTERVAL: 148 MS
EKG Q-T INTERVAL: 388 MS
EKG QRS DURATION: 104 MS
EKG QTC CALCULATION (BAZETT): 444 MS
EKG R AXIS: 79 DEGREES
EKG T AXIS: -11 DEGREES
EKG VENTRICULAR RATE: 79 BPM
HCT VFR BLD CALC: 19 % (ref 40.5–52.5)
HEMOGLOBIN: 6.5 G/DL (ref 13.5–17.5)
IMMATURE RETIC FRACT: 0.62 (ref 0.21–0.37)
INR BLD: 1.24 (ref 0.87–1.14)
LACTATE DEHYDROGENASE: 573 U/L (ref 100–190)
PROCALCITONIN: 0.07 NG/ML (ref 0–0.15)
PROTHROMBIN TIME: 15.5 SEC (ref 11.7–14.5)
RAPID INFLUENZA  B AGN: NEGATIVE
RAPID INFLUENZA A AGN: NEGATIVE
RETICULOCYTE ABSOLUTE COUNT: 0.27 M/UL
RETICULOCYTE COUNT PCT: 12.65 % (ref 0.5–2.18)
TROPONIN: <0.01 NG/ML

## 2022-08-28 PROCEDURE — 6370000000 HC RX 637 (ALT 250 FOR IP): Performed by: INTERNAL MEDICINE

## 2022-08-28 PROCEDURE — 99223 1ST HOSP IP/OBS HIGH 75: CPT | Performed by: INTERNAL MEDICINE

## 2022-08-28 PROCEDURE — 6360000002 HC RX W HCPCS: Performed by: INTERNAL MEDICINE

## 2022-08-28 PROCEDURE — 85025 COMPLETE CBC W/AUTO DIFF WBC: CPT

## 2022-08-28 PROCEDURE — 86902 BLOOD TYPE ANTIGEN DONOR EA: CPT

## 2022-08-28 PROCEDURE — 85045 AUTOMATED RETICULOCYTE COUNT: CPT

## 2022-08-28 PROCEDURE — 86923 COMPATIBILITY TEST ELECTRIC: CPT

## 2022-08-28 PROCEDURE — 86901 BLOOD TYPING SEROLOGIC RH(D): CPT

## 2022-08-28 PROCEDURE — 85610 PROTHROMBIN TIME: CPT

## 2022-08-28 PROCEDURE — 36415 COLL VENOUS BLD VENIPUNCTURE: CPT

## 2022-08-28 PROCEDURE — 87804 INFLUENZA ASSAY W/OPTIC: CPT

## 2022-08-28 PROCEDURE — 85730 THROMBOPLASTIN TIME PARTIAL: CPT

## 2022-08-28 PROCEDURE — 93005 ELECTROCARDIOGRAM TRACING: CPT | Performed by: INTERNAL MEDICINE

## 2022-08-28 PROCEDURE — 2580000003 HC RX 258: Performed by: INTERNAL MEDICINE

## 2022-08-28 PROCEDURE — 85014 HEMATOCRIT: CPT

## 2022-08-28 PROCEDURE — 84484 ASSAY OF TROPONIN QUANT: CPT

## 2022-08-28 PROCEDURE — 84145 PROCALCITONIN (PCT): CPT

## 2022-08-28 PROCEDURE — P9016 RBC LEUKOCYTES REDUCED: HCPCS

## 2022-08-28 PROCEDURE — 36430 TRANSFUSION BLD/BLD COMPNT: CPT

## 2022-08-28 PROCEDURE — 85018 HEMOGLOBIN: CPT

## 2022-08-28 PROCEDURE — 87449 NOS EACH ORGANISM AG IA: CPT

## 2022-08-28 PROCEDURE — 93010 ELECTROCARDIOGRAM REPORT: CPT | Performed by: INTERNAL MEDICINE

## 2022-08-28 PROCEDURE — 86850 RBC ANTIBODY SCREEN: CPT

## 2022-08-28 PROCEDURE — 85660 RBC SICKLE CELL TEST: CPT

## 2022-08-28 PROCEDURE — 1200000000 HC SEMI PRIVATE

## 2022-08-28 PROCEDURE — 86900 BLOOD TYPING SEROLOGIC ABO: CPT

## 2022-08-28 PROCEDURE — 83615 LACTATE (LD) (LDH) ENZYME: CPT

## 2022-08-28 RX ORDER — SODIUM CHLORIDE 9 MG/ML
INJECTION, SOLUTION INTRAVENOUS PRN
Status: DISCONTINUED | OUTPATIENT
Start: 2022-08-28 | End: 2022-08-30 | Stop reason: HOSPADM

## 2022-08-28 RX ORDER — HYDROMORPHONE HCL 110MG/55ML
1 PATIENT CONTROLLED ANALGESIA SYRINGE INTRAVENOUS EVERY 4 HOURS PRN
Status: DISCONTINUED | OUTPATIENT
Start: 2022-08-28 | End: 2022-08-30 | Stop reason: HOSPADM

## 2022-08-28 RX ORDER — OXYCODONE HYDROCHLORIDE 5 MG/1
10 TABLET ORAL EVERY 4 HOURS PRN
Status: DISCONTINUED | OUTPATIENT
Start: 2022-08-28 | End: 2022-08-30 | Stop reason: HOSPADM

## 2022-08-28 RX ORDER — ENOXAPARIN SODIUM 100 MG/ML
1 INJECTION SUBCUTANEOUS 2 TIMES DAILY
Status: DISCONTINUED | OUTPATIENT
Start: 2022-08-29 | End: 2022-08-28

## 2022-08-28 RX ADMIN — OXYCODONE 10 MG: 5 TABLET ORAL at 22:19

## 2022-08-28 RX ADMIN — OXYCODONE HYDROCHLORIDE 20 MG: 15 TABLET ORAL at 17:48

## 2022-08-28 RX ADMIN — OXYCODONE 10 MG: 5 TABLET ORAL at 03:57

## 2022-08-28 RX ADMIN — SODIUM CHLORIDE: 9 INJECTION, SOLUTION INTRAVENOUS at 08:01

## 2022-08-28 RX ADMIN — HYDROMORPHONE HYDROCHLORIDE 1 MG: 2 INJECTION, SOLUTION INTRAMUSCULAR; INTRAVENOUS; SUBCUTANEOUS at 10:33

## 2022-08-28 RX ADMIN — OXYCODONE HYDROCHLORIDE 20 MG: 15 TABLET ORAL at 12:42

## 2022-08-28 RX ADMIN — PANTOPRAZOLE SODIUM 40 MG: 40 TABLET, DELAYED RELEASE ORAL at 05:58

## 2022-08-28 RX ADMIN — HYDROMORPHONE HYDROCHLORIDE 1 MG: 2 INJECTION, SOLUTION INTRAMUSCULAR; INTRAVENOUS; SUBCUTANEOUS at 15:04

## 2022-08-28 RX ADMIN — AZITHROMYCIN MONOHYDRATE 500 MG: 250 TABLET ORAL at 23:36

## 2022-08-28 RX ADMIN — OXYCODONE 10 MG: 5 TABLET ORAL at 08:36

## 2022-08-28 RX ADMIN — HYDROMORPHONE HYDROCHLORIDE 1 MG: 2 INJECTION, SOLUTION INTRAMUSCULAR; INTRAVENOUS; SUBCUTANEOUS at 20:12

## 2022-08-28 RX ADMIN — SODIUM CHLORIDE, PRESERVATIVE FREE 10 ML: 5 INJECTION INTRAVENOUS at 20:12

## 2022-08-28 ASSESSMENT — PAIN DESCRIPTION - DESCRIPTORS
DESCRIPTORS: SHARP
DESCRIPTORS: ACHING;SHARP
DESCRIPTORS: SHARP
DESCRIPTORS: STABBING
DESCRIPTORS: ACHING;SHARP

## 2022-08-28 ASSESSMENT — PAIN DESCRIPTION - PAIN TYPE
TYPE: ACUTE PAIN

## 2022-08-28 ASSESSMENT — PAIN SCALES - GENERAL
PAINLEVEL_OUTOF10: 8
PAINLEVEL_OUTOF10: 7
PAINLEVEL_OUTOF10: 9
PAINLEVEL_OUTOF10: 8
PAINLEVEL_OUTOF10: 8
PAINLEVEL_OUTOF10: 9
PAINLEVEL_OUTOF10: 9
PAINLEVEL_OUTOF10: 7
PAINLEVEL_OUTOF10: 7
PAINLEVEL_OUTOF10: 6
PAINLEVEL_OUTOF10: 5

## 2022-08-28 ASSESSMENT — PAIN DESCRIPTION - LOCATION
LOCATION: CHEST

## 2022-08-28 ASSESSMENT — PAIN - FUNCTIONAL ASSESSMENT
PAIN_FUNCTIONAL_ASSESSMENT: ACTIVITIES ARE NOT PREVENTED

## 2022-08-28 ASSESSMENT — PAIN DESCRIPTION - ORIENTATION
ORIENTATION: LEFT
ORIENTATION: MID
ORIENTATION: MID
ORIENTATION: LEFT

## 2022-08-28 ASSESSMENT — PAIN DESCRIPTION - FREQUENCY
FREQUENCY: CONTINUOUS
FREQUENCY: CONTINUOUS

## 2022-08-28 NOTE — PROGRESS NOTES
Pt arrived to unit B3 via wheelchair. Pt educated on call bell system andplans for medications tonight. Phlebotomy tried to draw labs on the pt and he refused. Pt was educated about frequency of labs when he is started on the heparin drip and pt stated \"he does not feel like being stuck\". Charge RN made aware of these concerns, MD will be notified via PS. Pt calm and cooperative in the room, with no further needs at the moment.

## 2022-08-28 NOTE — ACP (ADVANCE CARE PLANNING)
Advance Care Planning     General Advance Care Planning (ACP) Conversation    Date of Conversation: 8/27/2022  Conducted with: Patient with Decision Making Capacity    Healthcare Decision Maker:    Primary Decision Maker: Radha Ben - Parent - [phone]    Secondary Decision Maker: Edouard Huizar - Parent - [phone]  Click here to complete Healthcare Decision Makers including selection of the Healthcare Decision Maker Relationship (ie \"Primary\"). Today we documented Decision Maker(s) consistent with Legal Next of Kin hierarchy.     Content/Action Overview:  Has NO ACP documents/care preferences - information provided, considering goals and options  Reviewed DNR/DNI and patient elects Full Code (Attempt Resuscitation)      Length of Voluntary ACP Conversation in minutes:  <16 minutes (Non-Billable)    Farhana Frias RN

## 2022-08-28 NOTE — PROGRESS NOTES
08/27/22 2348   RT Protocol   History Pulmonary Disease 0   Respiratory pattern 0   Breath sounds 0   Cough 0   Indications for Bronchodilator Therapy On home bronchodilators   Bronchodilator Assessment Score 0

## 2022-08-28 NOTE — CARE COORDINATION
CASE MANAGEMENT INITIAL ASSESSMENT      Reviewed chart and completed assessment with patient:bedside  Family present: none  Explained Case Management role/services. Primary contact information:[employer] [facility] Decision Maker :   Primary Decision Maker: Karen Guzman - Parent - [phone]    Secondary Decision Maker: Chikis Winslow - Parent - [phone]          Can this person be reached and be able to respond quickly, such as within a few minutes or hours? Yes      Admit date/status:8/27/22  Diagnosis:viral pneumonia   Is this a Readmission?:  No      Insurance:[employer]   Precert required for SNF: Yes       3 night stay required: No    Living arrangements, Adls, care needs, prior to admission:lives in home with parents. [address] at home:  Walker__Cane__RTS__ BSC__Shower Chair__  02__ HHN__ CPAP__  BiPap__  Hospital Bed__ W/C___ Other_____    Services in the home and/or outpatient, prior to admission:none    Current PCP:[facility]                                Medications Prescription coverage? yes    Transportation needs: none       PT/OT recs:none    Hospital Exemption Notification (HEN):needed for SNF    Barriers to discharge:none    Plan/comments:spoke with patient plans on returning home at discharge. Reported has support of parents. Denied any DCP needs.  Sean Briggs RN       ECOC on chart for MD signature

## 2022-08-28 NOTE — PROGRESS NOTES
Medicine Note      Pt refusing to be stuck for lab work - will stop IV heparin and start lovenox 1mg/kg subcut bid.     Electronically signed by Jadiel Hicks MD on 8/28/2022 at 12:03 AM

## 2022-08-28 NOTE — PROGRESS NOTES
Pt A&Ox4. VSS. Shift assessment completed. Pain controlled with PO and IV medication. Consent for blood transfusion signed and in pt's chart. Per Dr. Shahzad Pride transfuse only 1 unit of PRBC. Call light within reach, bed in lowest position, wheels locked.

## 2022-08-28 NOTE — PROGRESS NOTES
MD Laly Dale made aware that pt is now agreeing to getting his AM labs drawn. Pt and his father are concerned about his reticulocyte count, MD Laly Dale made aware of this via PS and new orders have been put in place. Phlebotomy has been contacted in regards to pt now agreeing to getting his labs drawn. They stated they will be up as soon as they can.

## 2022-08-28 NOTE — CONSULTS
CONTIN) 15 MG extended release tablet  5/3/22   Historical Provider, MD   ketorolac (TORADOL) 10 MG tablet  5/1/22   Historical Provider, MD   DULoxetine (CYMBALTA) 30 MG extended release capsule  5/10/22   Historical Provider, MD   apixaban (ELIQUIS) 5 MG TABS tablet Take 1 tablet by mouth 2 times daily  Patient not taking: Reported on 8/27/2022 12/31/21   Kitty Graff MD   pantoprazole (PROTONIX) 40 MG tablet Take 1 tablet by mouth every morning (before breakfast) 1/1/22   Kitty Graff MD   folic acid (FOLVITE) 1 MG tablet Take 2 tablets by mouth daily 9/16/21   CARMELITA Burton - CNP   oxyCODONE HCl (OXY-IR) 10 MG immediate release tablet Take 10 mg by mouth every 4 hours as needed for Pain.     Historical Provider, MD   albuterol sulfate HFA (PROVENTIL;VENTOLIN;PROAIR) 108 (90 Base) MCG/ACT inhaler Albuterol HFA Inhaler 90 mcg/actuation  inhaled  2 puffs prn     Active    Historical Provider, MD   levalbuterol (XOPENEX) 0.31 MG/3ML nebulization Levalbuterol Nebulized    2 puffs prn     Active    Historical Provider, MD   docusate (COLACE, DULCOLAX) 100 MG CAPS Take 100 mg by mouth  Patient not taking: Reported on 8/27/2022    Historical Provider, MD   ibuprofen (ADVIL;MOTRIN) 800 MG tablet ibuprofen 800 MG Oral Tablet  oral   prn    Active    Historical Provider, MD   hydroxyurea (HYDREA) 500 MG chemo capsule Take 1,000 mg by mouth daily   Patient not taking: Reported on 8/27/2022 12/9/20   Ling Sutherland MD        Current Medications:  Current Facility-Administered Medications   Medication Dose Route Frequency Provider Last Rate Last Admin    [START ON 8/29/2022] enoxaparin (LOVENOX) injection 80 mg  1 mg/kg SubCUTAneous BID Jadiel Hicks MD        oxyCODONE (ROXICODONE) immediate release tablet 10 mg  10 mg Oral Q4H PRN Jadiel Hicks MD   10 mg at 08/28/22 5863    Or    oxyCODONE (ROXICODONE) immediate release tablet 20 mg  20 mg Oral Q4H PRN Jadiel Hicks MD docusate sodium (COLACE) capsule 100 mg  100 mg Oral Daily aMk Banerjee MD        folic acid (FOLVITE) tablet 2 mg  2 mg Oral Daily Mak Banerjee MD        ketorolac (TORADOL) injection 30 mg  30 mg IntraVENous Q6H PRN Mak Banerjee MD        pantoprazole (PROTONIX) tablet 40 mg  40 mg Oral QAM AC Mak Banerjee MD   40 mg at 08/28/22 0558    0.9 % sodium chloride infusion   IntraVENous Continuous Mak Banerjee  mL/hr at 08/27/22 2330 New Bag at 08/27/22 2330    sodium chloride flush 0.9 % injection 5-40 mL  5-40 mL IntraVENous 2 times per day Mak Banerjee MD   10 mL at 08/27/22 2331    sodium chloride flush 0.9 % injection 5-40 mL  5-40 mL IntraVENous PRN Mak Banerjee MD        0.9 % sodium chloride infusion   IntraVENous PRN Mak Banerjee MD        ondansetron (ZOFRAN-ODT) disintegrating tablet 4 mg  4 mg Oral Q8H PRN Mak Banerjee MD        Or    ondansetron [facility]) injection 4 mg  4 mg IntraVENous Q6H PRN Mak Banerjee MD        polyethylene glycol (GLYCOLAX) packet 17 g  17 g Oral Daily PRN Mak Banerjee MD        acetaminophen (TYLENOL) tablet 650 mg  650 mg Oral Q6H PRN Mak Banerjee MD        Or    acetaminophen (TYLENOL) suppository 650 mg  650 mg Rectal Q6H PRN Mak Banerjee MD        cefTRIAXone (ROCEPHIN) 1,000 mg in dextrose 5 % 50 mL IVPB mini-bag  1,000 mg IntraVENous Q24H Mak Banerjee MD   Stopped at 08/28/22 0014    And    azithromycin (ZITHROMAX) tablet 500 mg  500 mg Oral Q24H Mak Banerjee MD   500 mg at 08/27/22 2334    aspirin EC tablet 81 mg  81 mg Oral Daily Mak Banerjee MD        albuterol sulfate HFA (PROVENTIL;VENTOLIN;PROAIR) 108 (90 Base) MCG/ACT inhaler 2 puff  2 puff Inhalation Q4H PRN Mak Banerjee MD            Allergies:  Morphine     Review of Systems:     Constitutional: there has been no unanticipated weight loss.  There's been no change in energy level, sleep pattern, or activity level. Eyes: No visual changes or diplopia. No scleral icterus. ENT: No Headaches, hearing loss or vertigo. No mouth sores or sore throat. Cardiovascular: Reviewed in HPI  Respiratory: No cough or wheezing, no sputum production. No hematemesis. Gastrointestinal: No abdominal pain, appetite loss, blood in stools. No change in bowel or bladder habits. Genitourinary: No dysuria, trouble voiding, or hematuria. Musculoskeletal:  No gait disturbance, weakness or joint complaints. Integumentary: No rash or pruritis. Neurological: No headache, diplopia, change in muscle strength, numbness or tingling. No change in gait, balance, coordination, mood, affect, memory, mentation, behavior. Psychiatric: No anxiety, no depression. Endocrine: No malaise, fatigue or temperature intolerance. No excessive thirst, fluid intake, or urination. No tremor. Hematologic/Lymphatic: No abnormal bruising or bleeding, blood clots or swollen lymph nodes. Allergic/Immunologic: No nasal congestion or hives.       Physical Examination:    Vitals:    08/28/22 0828   BP: (!) 104/51   Pulse: 82   Resp: 16   Temp: 98.6 °F (37 °C)   SpO2: 93%    Weight: 193 lb 14.4 oz (88 kg)         General Appearance:  Alert, cooperative, no distress, appears stated age   Head:  Normocephalic, without obvious abnormality, atraumatic   Eyes:  PERRL, conjunctiva/corneas clear       Nose: Nares normal, no drainage or sinus tenderness   Throat: Lips, mucosa, and tongue normal   Neck: Supple, symmetrical, trachea midline, no adenopathy, thyroid: not enlarged, symmetric, no tenderness/mass/nodules, no carotid bruit or JVD       Lungs:   Clear to auscultation bilaterally, respirations unlabored   Chest Wall:  No tenderness or deformity   Heart:  Regular rate and rhythm, S1, S2 normal, no murmur, rub or gallop   Abdomen:   Soft, non-tender, bowel sounds active all four quadrants,  no masses, no organomegaly Extremities: Extremities normal, atraumatic, no cyanosis or edema   Pulses: 2+ and symmetric   Skin: Skin color, texture, turgor normal, no rashes or lesions   Pysch: Normal mood and affect   Neurologic: Normal gross motor and sensory exam.         Labs  CBC:   Lab Results   Component Value Date/Time    WBC 14.0 08/28/2022 06:48 AM    RBC 2.12 08/28/2022 06:48 AM    HGB 7.2 08/28/2022 06:48 AM    HCT 19.8 08/28/2022 06:48 AM    MCV 93.5 08/28/2022 06:48 AM    RDW 22.3 08/28/2022 06:48 AM     08/28/2022 06:48 AM     CMP:    Lab Results   Component Value Date/Time     08/27/2022 02:10 PM    K 3.9 08/27/2022 02:10 PM    K 3.4 05/24/2022 05:43 AM     08/27/2022 02:10 PM    CO2 24 08/27/2022 02:10 PM    BUN 8 08/27/2022 02:10 PM    CREATININE 0.6 08/27/2022 02:10 PM    GFRAA >60 08/27/2022 02:10 PM    AGRATIO 1.5 08/27/2022 02:10 PM    LABGLOM >60 08/27/2022 02:10 PM    GLUCOSE 106 08/27/2022 02:10 PM    PROT 7.7 08/27/2022 02:10 PM    CALCIUM 9.1 08/27/2022 02:10 PM    BILITOT 5.9 08/27/2022 02:10 PM    ALKPHOS 93 08/27/2022 02:10 PM    AST 38 08/27/2022 02:10 PM    ALT 29 08/27/2022 02:10 PM     PT/INR:  No results found for: PTINR  Lab Results   Component Value Date    CKTOTAL 101 05/03/2021    TROPONINI <0.01 08/28/2022       EKG:  I have reviewed EKG with the following interpretation:  Impression:  sinus rhythm, lvh with secondary changes    Echo: Normal left ventricle size and systolic function with a visually estimated   ejection fraction of 55%. No regional wall motion abnormalities are seen. Normal left ventricular diastolic filling pressure. The right atrium is mildly enlarged. Mild pulmonic regurgitation. Systolic pulmonary artery pressure (SPAP) is normal and estimated at 27 mmHg   (right atrial pressure 3 mmHg).   Stress: none  Cath: none  MRI/EP/Other: none    Old notes reviewed  Telemetry reviewd  Ekg personally reviewed  Chest xray personally reviewed  Echo, cath, and Medications and labs reviewed  high complexity/medical decision making due to extensive data review, extensive history review, independent review of data, potential for decompensation   high risk due to acute illness, evaluation of drug-drug interactions, medication management and diagnostic interventions    Discussed with nursing  Assessment  Patient Active Problem List   Diagnosis    Sickle cell pain crisis ([facility]    Asthma    Sickle cell anemia (HCC)    Sepsis ([facility]    Opiate overdose ([facility]    Opiate antagonist poisoning, accidental or unintentional, initial encounter    Leukocytosis    Hypoxia    Anemia    Other chest pain    Pneumonia due to infectious organism    Fever    Chronic prescription opiate use    Right leg pain    Dental infection    Sickle cell crisis ([facility] [facility]    History of DVT in adulthood    Overweight (BMI 25.0-29. 9)    Chest pain    Abnormal EKG         Plan:    I had the opportunity to review the clinical symptoms and presentation of Javier Javed. Assessment/Plan:  Atypical chest pain  - constant, worsens with deep breath. Ruled out for PE  - this is not acute coronary syndrome  - lvh with strain pattern  Plan:  - obtain echo   - no indication for heparin from cardiology perspective, if hematology thinks it is necessary that is fine    2. \"History of enlarged heart\"  - patient reports that this was found after \"swine flu in 2013\"  - new problem  Plan:  - echocardiogram with bubble study    3. Sickle cell  - new problem  Plan:  - pain control and transfusion per primary    4. History of asthma  - stable  Plan:  - albuterol as needed    5. History of splenectomy and priaprism         I will address the patient's cardiac risk factors and adjusted pharmacologic treatment as needed. In addition, I have reinforced the need for patient directed risk factor modification. Tobacco use was discussed with the patient and educated on the negative effects.  I have asked the patient to not utilize these agents. Thank you for allowing [facility] Further evaluation will be based upon the patient's clinical course and testing results. All questions and concerns were addressed to the patient/family. Alternatives to my treatment were discussed. The note was completed using EMR. Every effort was made to ensure accuracy; however, inadvertent computerized transcription errors may be present. All questions and concerns were addressed to the patient/family. Alternatives to my treatment were discussed. The note was completed using EMR. Every effort was made to ensure accuracy; however, inadvertent computerized transcription errors may be present.   MATTHEW Hernandez,0/18/3058 8:44 AM

## 2022-08-28 NOTE — CONSULTS
Pharmacy to Manage Heparin Infusion per [address] [place] [address] wt = _67.3YZ__ (will use adjusted wt if actual body weight > 120% ideal body weight). Pt no longer takes eliquis -  last dose about a month ago per pt    Oral factor Xa-inhibitors may alter and elevate anti-Xa levels used for unfractionated heparin monitoring. As a result, anti-Xa monitoring is not accurate while Xa-inhibitor activity is detectable. Utilize aPTT monitoring when patient received an oral factor Xa-inhibitor (apixaban, betrixaban, edoxaban or rivaroxaban) within 72 hours prior to admission (please document last administration time). The goal is to allow a washout of oral factor Xa-inhibitors by using aPTT for 72 hours, then change to ant-Xa levels for UFH. Heparin (weight-based) Infusion: CAD/STEMI/NSTEMI/UA/AFib)   Heparin 60 units/kg IVP bolus followed by Heparin infusion at 12 units/kg/hr (recommended initial max dose 1000 units/hr). Recheck anti-Xa (unless aPTT being used) in 6 hours. Goal anti-Xa 0.3-0.7 IU/mL  Goal aPTT =  seconds.   Marcy [address]  8/27/2022 at 11:06 PM

## 2022-08-28 NOTE — CONSULTS
Consult placed    Simeon Mahoney  Date:8/28/2022,  Time:7:53 AM        Electronically signed by Marissa Mclaughlin on 8/28/2022 at 7:53 AM

## 2022-08-28 NOTE — H&P
Internal Medicine History and Physical    Pt evaluated on:  8/27/2022    CHIEF COMPLAINT:  chest pain    History of Present Illness: This is a 20 y.o. AAM with PMHx sig for sickle cell anemia, hx of recent priapism due to sickle cell, asthma, hx of opiate overdose who comes in c/o low grade fever to 100, chest pain and cough. Pt is COVID19 neg, but does have infiltrates on his chest CT. Of more concern are some ischemic changes on his EKG. Based on this, I wanted to admit him with IV heparin, serial enzymes and cardiology consult. Pt already refusing blood draws and now refusing any medication because he wants IV dilaudid like he got in the ED. Past Medical History:   Diagnosis Date    Accidental overdose     Asthma     DVT (deep venous thrombosis) ([address] 10/19/2021    R leg    Enlarged heart     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)     Sickle cell anemia (HCC)      Active Ambulatory Problems     Diagnosis Date Noted    Sickle cell pain crisis ([address] 07/25/2020    Asthma 07/25/2020    Sickle cell anemia ([address] 07/28/2020    Sepsis ([address] 08/06/2020    Opiate overdose ([address] 09/26/2020    Opiate antagonist poisoning, accidental or unintentional, initial encounter 09/26/2020    Leukocytosis 12/02/2020    Hypoxia 12/02/2020    Anemia 12/02/2020    Other chest pain 12/02/2020    Pneumonia due to infectious organism     Fever 01/05/2021    Chronic prescription opiate use 01/05/2021    Right leg pain 10/17/2021    Dental infection 03/25/2022    Sickle cell crisis ([address] 03/25/2022    History of DVT in adulthood     Overweight (BMI 25.0-29. 9)      Resolved Ambulatory Problems     Diagnosis Date Noted    Knee pain 10/17/2021     Past Medical History:   Diagnosis Date    Accidental overdose     DVT (deep venous thrombosis) ([address] 10/19/2021    Enlarged heart     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 ([address]          Past Medical History:   Diagnosis Date    Accidental overdose     Asthma     DVT (deep venous thrombosis) ([facility] 10/19/2021    R leg    Enlarged heart     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)     Sickle cell anemia (HCC)      Past Surgical History:   Procedure Laterality Date    SPLENECTOMY           Medications Prior to Admission:    Medications Prior to Admission: morphine (MS CONTIN) 15 MG extended release tablet,   ketorolac (TORADOL) 10 MG tablet,   DULoxetine (CYMBALTA) 30 MG extended release capsule,   apixaban (ELIQUIS) 5 MG TABS tablet, Take 1 tablet by mouth 2 times daily (Patient not taking: Reported on 8/27/2022)  pantoprazole (PROTONIX) 40 MG tablet, Take 1 tablet by mouth every morning (before breakfast)  folic acid (FOLVITE) 1 MG tablet, Take 2 tablets by mouth daily  oxyCODONE HCl (OXY-IR) 10 MG immediate release tablet, Take 10 mg by mouth every 4 hours as needed for Pain.   albuterol sulfate HFA (PROVENTIL;VENTOLIN;PROAIR) 108 (90 Base) MCG/ACT inhaler, Albuterol HFA Inhaler 90 mcg/actuation  inhaled  2 puffs prn     Active  levalbuterol (XOPENEX) 0.31 MG/3ML nebulization, Levalbuterol Nebulized    2 puffs prn     Active  docusate (COLACE, DULCOLAX) 100 MG CAPS, Take 100 mg by mouth (Patient not taking: Reported on 8/27/2022)  ibuprofen (ADVIL;MOTRIN) 800 MG tablet, ibuprofen 800 MG Oral Tablet  oral   prn    Active  hydroxyurea (HYDREA) 500 MG chemo capsule, Take 1,000 mg by mouth daily  (Patient not taking: Reported on 8/27/2022)  Current Facility-Administered Medications   Medication Dose Route Frequency Provider Last Rate Last Admin    [START ON 8/29/2022] enoxaparin (LOVENOX) injection 80 mg  1 mg/kg SubCUTAneous BID Alivia Cline MD        docusate sodium (COLACE) capsule 100 mg  100 mg Oral Daily Alivia Cline MD        folic acid (FOLVITE) tablet 2 mg  2 mg Oral Daily Alivia Cline MD        oxyCODONE (ROXICODONE) immediate release tablet 10 mg  10 mg Oral Q4H PRN Alivia Cline MD        ketorolac (TORADOL) injection 30 mg  30 mg IntraVENous Q6H PRN Mehran Mahoney MD        pantoprazole (PROTONIX) tablet 40 mg  40 mg Oral QAM AC Mehran Mahoney MD        0.9 % sodium chloride infusion   IntraVENous Continuous Mehran Mahoney  mL/hr at 08/27/22 2330 New Bag at 08/27/22 2330    sodium chloride flush 0.9 % injection 5-40 mL  5-40 mL IntraVENous 2 times per day Mehran Mahoney MD   10 mL at 08/27/22 2331    sodium chloride flush 0.9 % injection 5-40 mL  5-40 mL IntraVENous PRN Mehran Mahoney MD        0.9 % sodium chloride infusion   IntraVENous PRN Mehran Mahoney MD        ondansetron (ZOFRAN-ODT) disintegrating tablet 4 mg  4 mg Oral Q8H PRN Mehran Mahoney MD        Or    ondansetron [facility]) injection 4 mg  4 mg IntraVENous Q6H PRN Mehran Mahoney MD        polyethylene glycol (GLYCOLAX) packet 17 g  17 g Oral Daily PRN Mehran Mahoney MD        acetaminophen (TYLENOL) tablet 650 mg  650 mg Oral Q6H PRN Mehran Mahoney MD        Or    acetaminophen (TYLENOL) suppository 650 mg  650 mg Rectal Q6H PRN Mehran Mahoney MD        cefTRIAXone (ROCEPHIN) 1,000 mg in dextrose 5 % 50 mL IVPB mini-bag  1,000 mg IntraVENous Q24H Mehran Mahoney MD   Stopped at 08/28/22 0014    And    azithromycin (ZITHROMAX) tablet 500 mg  500 mg Oral Q24H Mehran Mahoney MD   500 mg at 08/27/22 2334    aspirin EC tablet 81 mg  81 mg Oral Daily Mehran Mahoney MD        albuterol sulfate HFA (PROVENTIL;VENTOLIN;PROAIR) 108 (90 Base) MCG/ACT inhaler 2 puff  2 puff Inhalation Q4H PRN Mehran Mahoney MD           Prior to Admission medications    Medication Sig Start Date End Date Taking?  Authorizing Provider   morphine (MS CONTIN) 15 MG extended release tablet  5/3/22   Historical Provider, MD   ketorolac (TORADOL) 10 MG tablet  5/1/22   Historical Provider, MD   DULoxetine (CYMBALTA) 30 MG extended release capsule  5/10/22   Historical Provider, MD   apixaban (ELIQUIS) 5 MG TABS tablet Take 1 tablet by mouth 2 times daily  Patient not taking: Reported on 8/27/2022 12/31/21   Kandis Ackerman MD   pantoprazole (PROTONIX) 40 MG tablet Take 1 tablet by mouth every morning (before breakfast) 1/1/22   Kandis Ackerman MD   folic acid (FOLVITE) 1 MG tablet Take 2 tablets by mouth daily 9/16/21   CARMELITA Haro - CNP   oxyCODONE HCl (OXY-IR) 10 MG immediate release tablet Take 10 mg by mouth every 4 hours as needed for Pain. Historical Provider, MD   albuterol sulfate HFA (PROVENTIL;VENTOLIN;PROAIR) 108 (90 Base) MCG/ACT inhaler Albuterol HFA Inhaler 90 mcg/actuation  inhaled  2 puffs prn     Active    Historical Provider, MD   levalbuterol (XOPENEX) 0.31 MG/3ML nebulization Levalbuterol Nebulized    2 puffs prn     Active    Historical Provider, MD   docusate (COLACE, DULCOLAX) 100 MG CAPS Take 100 mg by mouth  Patient not taking: Reported on 8/27/2022    Historical Provider, MD   ibuprofen (ADVIL;MOTRIN) 800 MG tablet ibuprofen 800 MG Oral Tablet  oral   prn    Active    Historical Provider, MD   hydroxyurea (HYDREA) 500 MG chemo capsule Take 1,000 mg by mouth daily   Patient not taking: Reported on 8/27/2022 12/9/20   Ha Mario MD         Allergies:    Morphine    Social History:      reports that he has never smoked. He has never used smokeless tobacco. He reports that he does not currently use alcohol. He reports that he does not use drugs. Family History:   Family History   Problem Relation Age of Onset    Other Father         sarcoidosis          REVIEW OF SYSTEMS:  As above in the HPI. All other review of systems were asked and were negative except for chest pain, cough, fever- denies any priapism sx. PHYSICAL EXAM:    Vitals:  /66   Pulse 73   Temp 98.6 °F (37 °C) (Oral)   Resp 19   Ht 5' 9\" (1.753 m)   Wt 185 lb (83.9 kg)   SpO2 93%   BMI 27.32 kg/m²     General:  chronically ill appearing  HEENT:  Normocephalic, atraumatic.   Pupils equal, round, reactive to light. No scleral icterus. No conjunctival injection. Normal lips, teeth, and gums. No nasal discharge. Neck:  Supple  Heart:  Normal s1/s2, RRR, no murmurs, gallops, or rubs. no leg edema  Lungs:  diminished bilaterally, no wheeze, no rales, no rhonchi, no use of accessory muscles  Abd: bowel sounds present, soft, nontender, nondistended, no masses  Extrem:  No clubbing, cyanosis,  no edema, 2+ pedal pulses, brisk capillary refill  Skin:  Warm and dry, no open lesions or rash,  normal color/perfusion  Psych:  A&O x 3, appropriate mood and affect. Neuro: grossly intact, moves all four extremities.    .    Breast: deferred  Rectal: deferred  Genitalia:  deferred    LABS:  Lab Results   Component Value Date/Time    WBC 17.6 08/27/2022 02:10 PM    RBC 2.26 08/27/2022 02:10 PM    HGB 7.6 08/27/2022 02:10 PM    HCT 21.1 08/27/2022 02:10 PM    MCV 93.5 08/27/2022 02:10 PM    MCH 33.4 08/27/2022 02:10 PM    MCHC 35.7 08/27/2022 02:10 PM    RDW 22.3 08/27/2022 02:10 PM     08/27/2022 02:10 PM    MPV 7.9 08/27/2022 02:10 PM     Lab Results   Component Value Date/Time     08/27/2022 02:10 PM    K 3.9 08/27/2022 02:10 PM    K 3.4 05/24/2022 05:43 AM     08/27/2022 02:10 PM    CO2 24 08/27/2022 02:10 PM    BUN 8 08/27/2022 02:10 PM    CREATININE 0.6 08/27/2022 02:10 PM    GFRAA >60 08/27/2022 02:10 PM    AGRATIO 1.5 08/27/2022 02:10 PM    LABGLOM >60 08/27/2022 02:10 PM    GLUCOSE 106 08/27/2022 02:10 PM    PROT 7.7 08/27/2022 02:10 PM    LABALBU 4.6 08/27/2022 02:10 PM    CALCIUM 9.1 08/27/2022 02:10 PM    BILITOT 5.9 08/27/2022 02:10 PM    ALKPHOS 93 08/27/2022 02:10 PM    AST 38 08/27/2022 02:10 PM    ALT 29 08/27/2022 02:10 PM     Lab Results   Component Value Date/Time    PROTIME 14.4 09/28/2021 02:43 AM    INR 1.26 09/28/2021 02:43 AM     Recent Labs     08/27/22  1410   TROPONINI <0.01     Lab Results   Component Value Date/Time    NITRU Negative 05/27/2022 07:00 AM    COLORU Yellow 05/27/2022 07:00 AM    PHUR 7.0 05/27/2022 07:00 AM    WBCUA 0-2 05/27/2022 07:00 AM    RBCUA 3-4 05/27/2022 07:00 AM    MUCUS 1+ 05/27/2022 07:00 AM    BACTERIA 2+ 05/27/2022 07:00 AM    CLARITYU Clear 05/27/2022 07:00 AM    SPECGRAV 1.015 05/27/2022 07:00 AM    LEUKOCYTESUR Negative 05/27/2022 07:00 AM    UROBILINOGEN 1.0 05/27/2022 07:00 AM    BILIRUBINUR Negative 05/27/2022 07:00 AM    BLOODU SMALL 05/27/2022 07:00 AM    GLUCOSEU Negative 05/27/2022 07:00 AM    KETUA Negative 05/27/2022 07:00 AM    AMORPHOUS Rare 03/04/2022 07:53 PM     Lab Results   Component Value Date/Time    MG 1.90 05/24/2022 05:43 AM    PHOS 4.5 10/20/2021 06:39 AM     No results found for: LABA1C  Lab Results   Component Value Date/Time    YUXIOZWL28 388 08/31/2021 09:00 AM    FOLATE 12.82 08/31/2021 09:00 AM    FERRITIN 1,392.0 09/14/2021 05:45 AM    IRON 63 08/17/2020 11:19 AM    TIBC 181 08/17/2020 11:19 AM     No results found for: TRIG, HDL, LDLCALC, LDLDIRECT, LABVLDL  No results found for: AMYLASE, LIPASE  No results found for: BNP  Lab Results   Component Value Date/Time    LACTA 0.8 03/25/2022 12:40 AM     Lab Results   Component Value Date/Time    PHART 7.396 09/26/2020 07:57 PM    LBO6OGP 44.8 09/26/2020 07:57 PM    PO2ART 69.8 09/26/2020 07:57 PM    WXA9PMG 26.9 09/26/2020 07:57 PM    BEART 1.8 09/26/2020 07:57 PM    FHR0WNI 28.3 09/26/2020 07:57 PM    G5FCNPOX 88.5 09/26/2020 07:57 PM     Lab Results   Component Value Date/Time    LABAMPH Neg 12/28/2021 01:40 PM    BARBSCNU Neg 12/28/2021 01:40 PM    LABBENZ Neg 12/28/2021 01:40 PM    LABMETH Neg 12/28/2021 01:40 PM    OPIATESCREENURINE POSITIVE 12/28/2021 01:40 PM    PHENCYCLIDINESCREENURINE Neg 12/28/2021 01:40 PM     Lab Results   Component Value Date/Time    DDIMER 0.96 08/27/2022 02:48 PM     No results found for: CYPM60        RADIOLOGY:  XR CHEST (2 VW)    Result Date: 8/27/2022  EXAMINATION: TWO XRAY VIEWS OF THE CHEST 8/27/2022 2:32 pm COMPARISON: 05/09/2022 HISTORY: ORDERING SYSTEM PROVIDED HISTORY: pain TECHNOLOGIST PROVIDED HISTORY: Reason for exam:->pain Reason for Exam: fever, chest pain and SOB for the past 5 days FINDINGS: The cardiac silhouette, mediastinal and hilar contours are normal.  The lungs are clear. There are no pleural effusions. No acute osseous abnormalities are identified. No acute cardiopulmonary disease. CT CHEST PULMONARY EMBOLISM W CONTRAST    Result Date: 8/27/2022  EXAMINATION: CTA OF THE CHEST 8/27/2022 4:20 pm TECHNIQUE: CTA of the chest was performed after the administration of intravenous contrast.  Multiplanar reformatted images are provided for review. MIP images are provided for review. Automated exposure control, iterative reconstruction, and/or weight based adjustment of the mA/kV was utilized to reduce the radiation dose to as low as reasonably achievable. COMPARISON: Radiographs 08/27/2022 and CT 05/02/2022. HISTORY: ORDERING SYSTEM PROVIDED HISTORY: chest pain TECHNOLOGIST PROVIDED HISTORY: Reason for exam:->chest pain Decision Support Exception - unselect if not a suspected or confirmed emergency medical condition->Emergency Medical Condition (MA) Reason for Exam: chest pain-sob since tuesday-fever FINDINGS: Pulmonary Arteries: Pulmonary arteries are adequately opacified for evaluation. No evidence of intraluminal filling defect to suggest pulmonary embolism. Main pulmonary artery is normal in caliber. Mediastinum: There is no lymphadenopathy. Mild cardiomegaly appears unchanged. There is no pericardial effusion. The Lungs/pleura: There are mild patchy peripheral ground-glass opacities in the lower lobes. The central airways are normally patent. There is no pleural effusion. Upper Abdomen: Limited images of the upper abdomen are unremarkable. Soft Tissues/Bones: No fracture or destructive bone lesion is identified. Gynecomastia is again noted. 1. No pulmonary embolism.  2. Patchy peripheral ground-glass opacities in the lower lobes which could reflect atelectasis, pneumonitis or viral pneumonia. 3. Stable cardiomegaly. RECOMMENDATIONS: Unavailable       EKG:  Normal sinus rhythmMinimal voltage criteria for LVH, may be normal variantT wave abnormality, consider inferolateral ischemiaAbnormal  - EKG is different from May 22 - T wave inversion in II, V3-V6 are new    PHYSICIAN CERTIFICATION    I certify that [facility] is expected to be hospitalized for >2 midnights based on the following assessment and plan:      ASSESSMENT:      Patient Active Problem List   Diagnosis    Sickle cell pain crisis ([facility]    Asthma    Sickle cell anemia (HCC)    Sepsis ([facility]    Opiate overdose ([facility]    Opiate antagonist poisoning, accidental or unintentional, initial encounter    Leukocytosis    Hypoxia    Anemia    Other chest pain    Pneumonia due to infectious organism    Fever    Chronic prescription opiate use    Right leg pain    Dental infection    Sickle cell crisis ([facility]    History of DVT in adulthood    Overweight (BMI 25.0-29. 9)    Chest pain    Abnormal EKG       Principal Problem:    Chest pain  Active Problems:    Abnormal EKG    Sickle cell anemia (HCC)    History of DVT in adulthood  Resolved Problems:    * No resolved hospital problems. *      PLAN:     Chest pain/EKG changes - I am concerned about possible chest syndrome as pt has new infiltrate, chest pain and fever- however he only meets the criteria for chest pain and reported cough - he is not wheezing, nor has his reported fever been over 100, his sats are 92-98 on RA.  He states that he is not taking eliquis anymore - he does not have tachypnea nor does his pain seem to be severe - it is difficult to gauge - have consulted Dr. Helga Carrasquillo to help manage  EKG changes - initially started on IV heparin, but pt refusing lab draws so changed to subcut lovenox therapeutic dosing - added ASA and consulted cardiology    Sickle cell disease - does not seem to be in acute crisis - IV narcotics are not indicated - I will continus his oral oxycodone and IV toradol  With the lung infiltrate - I did start abx, however his initial procalcitonin is negative - his COVID19 test is also negative      DVT prophylaxis Yes:  lovenox  GI prophylaxis protonix  Antibiotic prophylaxis:  No    Code status Full code      Please note that over 50 minutes was spent in evaluating the patient, review of records and results, discussion with staff/family, etc.    Electronically signed by Mak Banerjee MD on 8/28/2022 at 1:40 AM

## 2022-08-28 NOTE — CONSULTS
HEMATOLOGY/ONCOLOGY CONSULTATION:     8/28/2022 8:25 AM    REASON FOR CONSULT: Sickle Cell Disease    PROVIDERS:  Dinh Preciado MD    CHIEF COMPLAINT:     Chief Complaint   Patient presents with    Fever    Shortness of Breath    Chest Pain     All sx since Tuesday       HISTORY OF PRESENT ILLNESS:     HPI:  20 AAM with known sickle cell disease who is followed by Dr. Nohemy Valero. He also has pmh DVT and priapism and has had opiate OD in the past. Pain mgmt has been an on-going issue for him and he has been referred to pain mgmt. He presented with low grade fever and chest pain. He had some EKG changes and an infiltrate noted on CT chest. He has been started on IV abx. Hgb is 7.2 and 02 sats have been in the mid 90s.      PAST MEDICAL HISTORY:     Past Medical History:   Diagnosis Date    Accidental overdose     Asthma     DVT (deep venous thrombosis) ([facility] 10/19/2021    R leg    Enlarged heart     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)     Sickle cell anemia (HCC)        PAST SURGICAL HISTORY:        Past Surgical History:   Procedure Laterality Date    SPLENECTOMY         SOCIAL HISTORY:     Social History     Socioeconomic History    Marital status: Single     Spouse name: Not on file    Number of children: 0    Years of education: Not on file    Highest education level: Not on file   Occupational History    Not on file   Tobacco Use    Smoking status: Never    Smokeless tobacco: Never   Vaping Use    Vaping Use: Never used   Substance and Sexual Activity    Alcohol use: Not Currently    Drug use: Never    Sexual activity: Not Currently   Other Topics Concern    Not on file   Social History Narrative    Not on file     Social Determinants of Health     Financial Resource Strain: Not on file   Food Insecurity: Not on file   Transportation Needs: Not on file   Physical Activity: Not on file   Stress: Not on file   Social Connections: Not on file   Intimate Partner Violence: Not on file   Housing Stability: Not on file       FAMILY HISTORY:     Family History   Problem Relation Age of Onset    Other Father         sarcoidosis       ALLERGIES:     Allergies as of 08/27/2022 - Fully Reviewed 08/27/2022   Allergen Reaction Noted    Morphine Shortness Of Breath 04/05/2013       MEDICATIONS:     No current facility-administered medications on file prior to encounter. Current Outpatient Medications on File Prior to Encounter   Medication Sig Dispense Refill    morphine (MS CONTIN) 15 MG extended release tablet  (Patient not taking: Reported on 8/27/2022)      ketorolac (TORADOL) 10 MG tablet  (Patient not taking: Reported on 8/27/2022)      DULoxetine (CYMBALTA) 30 MG extended release capsule  (Patient not taking: Reported on 8/27/2022)      apixaban (ELIQUIS) 5 MG TABS tablet Take 1 tablet by mouth 2 times daily (Patient not taking: Reported on 8/27/2022) 60 tablet 0    pantoprazole (PROTONIX) 40 MG tablet Take 1 tablet by mouth every morning (before breakfast) 30 tablet 0    folic acid (FOLVITE) 1 MG tablet Take 2 tablets by mouth daily 30 tablet 3    oxyCODONE HCl (OXY-IR) 10 MG immediate release tablet Take 10 mg by mouth every 4 hours as needed for Pain. albuterol sulfate HFA (PROVENTIL;VENTOLIN;PROAIR) 108 (90 Base) MCG/ACT inhaler Albuterol HFA Inhaler 90 mcg/actuation  inhaled  2 puffs prn     Active      levalbuterol (XOPENEX) 0.31 MG/3ML nebulization Levalbuterol Nebulized    2 puffs prn     Active      docusate (COLACE, DULCOLAX) 100 MG CAPS Take 100 mg by mouth (Patient not taking: Reported on 8/27/2022)      ibuprofen (ADVIL;MOTRIN) 800 MG tablet ibuprofen 800 MG Oral Tablet  oral   prn    Active      hydroxyurea (HYDREA) 500 MG chemo capsule Take 1,000 mg by mouth daily  (Patient not taking: Reported on 8/27/2022)       REVIEW OF SYSTEMS:       Constitutional: + fever   Eyes: No visual changes or diplopia. No scleral icterus. ENT: No Headaches, hearing loss or vertigo.  No mouth sores or sore throat. Cardiovascular: + chest pain  Respiratory: No cough or wheezing, no sputum production. No hemoptysis. .    Gastrointestinal: No abdominal pain, appetite loss, blood in stools. No change in bowel habits. Genitourinary: No dysuria, trouble voiding, or hematuria. Musculoskeletal:  Generalized weakness. No joint complaints. Integumentary: No rash or pruritis. Neurological: No headache, diplopia. No change in gait, balance, or coordination. No paresthesias. Endocrine: No temperature intolerance. No excessive thirst, fluid intake, or urination. Hematologic/Lymphatic: No abnormal bruising or ecchymoses, blood clots or swollen lymph nodes. Allergic/Immunologic: No nasal congestion or hives.      PHYSICAL EXAM:       Vitals:    08/28/22 0549   BP: 110/62   Pulse: 81   Resp: 19   Temp: 97.8 °F (36.6 °C)   SpO2: 93%       General appearance: alert and cooperative  Head: Normocephalic, without obvious abnormality, atraumatic  Neck: No palpable lymphadenopathy in supraclavicular or cervical chains  Lungs: Clear to auscultation bilaterally, no audible rales, wheezes or crackles  Heart: Regular rate and rhythm, S1, S2 normal  Abdomen: Soft, non-tender; bowel sounds normal; no masses,  no organomegaly  Extremities: without cyanosis, clubbing, edema or asymmetry  Skin: No jaundice, purpura or petechiae      LABS:     Lab Results   Component Value Date    WBC 17.6 (H) 08/27/2022    HGB 7.6 (L) 08/27/2022    HCT 21.1 (L) 08/27/2022    MCV 93.5 08/27/2022     08/27/2022       Lab Results   Component Value Date    GLUCOSE 106 (H) 08/27/2022    BUN 8 08/27/2022    CREATININE 0.6 (L) 08/27/2022    K 3.9 08/27/2022    PHOS 4.5 10/20/2021       Lab Results   Component Value Date    ALKPHOS 93 08/27/2022    ALT 29 08/27/2022    AST 38 (H) 08/27/2022    BILITOT 5.9 (H) 08/27/2022    BILIDIR 0.6 (H) 05/01/2022    PROT 7.7 08/27/2022       Lab Results   Component Value Date    IRON 63 08/17/2020    TIBC 181 (L) 08/17/2020    FERRITIN 1,392.0 (H) 09/14/2021       IMAGING:     XR CHEST (2 VW)  Result Date: 8/27/2022  No acute cardiopulmonary disease. CT CHEST PULMONARY EMBOLISM W CONTRAST  Result Date: 8/27/2022  1. No pulmonary embolism. 2. Patchy peripheral ground-glass opacities in the lower lobes which could reflect atelectasis, pneumonitis or viral pneumonia. 3. Stable cardiomegaly.  RECOMMENDATIONS: Unavailable     ASSESSMENT/PLAN:     Sickle cell crisis:  -He is noncompliant and has not been taking his Hydrea-He quit taking his Hydrea  -IV crizanlizumab was stopped for non-compliance and lack of efficacy  -He refuses to see a pain specialist   -He refuses a support group as supplied by his insurance company  -His narcotics have to be carefully controlled for concern of an overdose.  -he has been informed he will need to find a pain  and our office will stop prescribing narcotics - he was given a 30 day notice earlier this month     Right sided chest pain:  -This seems minor this morning  -He will receive 1 unit of packed red blood cells  -continue Toradol 30 mg IV every 8x8 doses  - continue IVF and 02  - transfuse PRBC  - cardiology to see for EKG changes  - continue home oxycodone  - addded prn dilaudid in case his oxycodone and toradol do not help      Lower lobe infiltrate:  -on abx with azithromycin and cefepime     Marce Rosas MD

## 2022-08-28 NOTE — PROGRESS NOTES
LAST [facility] OFFICE NOTE 22:    Patient Name: Amita Servin  Patient : 2000  Patient MRN: [id number]    Primary Oncologist: Delonte Zapien  Referring Physician:        Date of Service: 2022      Chief Complaint  Sickle cell anemia      Problem List  Sickle cell anemia  Asthma  Back pain  Chronic pain  Vitamin D deficiency (disorder)    HPI  Mr Yasmany Dale is a 20year old  man who has sickle cell anemia. He has been managed by [facility] till this time. he is maintained on hydrea but has let his prescription run out He has not required frequent transfusions . He has had pain crises which he states happen every few months. He is treated with oxycodone during these times. he  had chest crisis in 2016He states he has an enlarged heart. 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 he was referred to [facility] dental clinic but I am uncertain as to whether he followed up According to notes he has not been consistent with hydrea and vitamin d replacement     Previous Therapies  see above    Current Therapy         Interval History    Juancho Zayas returns today for further follow-up. Since his last visit here he was evaluated in the emergency room at [facility] due to back pain. He was going to be given Toradol, but left prior to this as he wanted to receive Dilaudid. He reports that his pain is stable today on his current regimen. No nausea or vomiting. No chest pain. He tells me he will be starting a new job on Monday at [employer]. Review of Systems  Constitutional:  No weight loss, No fever, No chills, No night sweats. Energy level good.   Eyes:  No impairment or change in vision  ENT / Mouth:  No pain, abnormal ulceration, bleeding, nasal drip or change in voice or hearing  Cardiovascular:  No chest pain, palpitations, new edema, or calf discomfort  Respiratory:  No pain, hemoptysis, change to breathing  Breast:  No pain, discharge, change in appearance or texture  Gastrointestinal:  No pain, cramping, jaundice, change to eating and bowel habits  Urinary:  No pain, bleeding or change in continence  Genitalia: No pain, bleeding or discharge  Musculoskeletal:  No redness, pain, edema or weakness  Skin:  No pruritus, rash, change to nodules or lesions  Neurologic:  No discomfort, change in mental status, speech, sensory or motor activity  Psychiatric:  No change in concentration or change to affect or mood  Endocrine:  No hot flashes, increased thirst, or change to urine production  Hematologic: No petechiae, ecchymosis or bleeding  Lymphatic:  No lymphadenopathy or lymphedema  Allergy / Immunologic:  No eczema, hives, frequent or recurrent infections      Vital Signs  Blood pressure: , Pulse: , Temperature: , Respirations: , O2 sat: , Pain Scale: , Height: , Weight: , BSA: , BMI:    Physical Exam    CONSTITUTIONAL: awake, alert, cooperative, no apparent distress   EYES: pupils equal, round and reactive to light, sclera clear and conjunctiva normal  ENT: Normocephalic, without obvious abnormality, atraumatic  NECK: supple, symmetrical, no jugular venous distension and no carotid bruits   HEMATOLOGIC/LYMPHATIC: no cervical, supraclavicular or axillary lymphadenopathy   LUNGS: no increased work of breathing and clear to auscultation   CARDIOVASCULAR: regular rate and rhythm, normal S1 and S2, no murmur noted  ABDOMEN: normal bowel sounds x 4, soft, non-distended, non-tender, no masses palpated, no hepatosplenomegaly   MUSCULOSKELETAL: full range of motion noted, tone is normal  NEUROLOGIC: awake, alert, oriented to name, place and time. Motor skills grossly intact. SKIN: Normal skin color, texture, turgor and no jaundice.  appears intact   EXTREMITIES: Without cyanosis, clubbing, edema or asymmetry                  Labs  CBC  Lab Results 07/29/2022 07/26/2022 06/10/2022 06/05/2022 05/27/2022 05/10/2022    CBC                 WBC x 10^3/uL 14.3 (H)   12.1 (H)     19.7 (H)      RBC x 10^6/uL 2.39 (L)   3.18 (L)     2.44 (L)      HGB g/dL 7.7 (L)   9.9 (L)     7.6 (L)      HCT % 21.3 (L)   30.2 (L)     21.9 (L)      MCV fL 89.1   95.0 (H)     89.8      MCH pg 32.2   31.1     31.1      MCHC g/dL 36.2   32.8     34.7      RDW-CV, % 19.8 (H)   16.6 (H)     22.0 (H)      PLT x 10^3/uL 474.0 (H)   436.0 (H)     552.0 (H)      Mee % 56.3   66.8     65.0      LY % 25.1   21.0 (L)     17.8 (L)      MO % 16.4 (H)   10.1     16.0 (H)      EO % 1.7   1.8     0.9      BA % 0.5   0.3     0.3      Mee # (ANC) x 10^3/uL 8.06 (H)   8.08 (H)     12.83 (H)      LY # x 10^3/uL 3.59 (H)   2.55     3.52      MO # x 10^3/uL 2.34 (H)   1.23 (H)     3.16 (H)      EO # x 10^3/uL 0.25   0.22     0.18      BA # x 10^3/uL 0.07   0.04     0.05  CMP  Lab Results 07/29/2022 07/26/2022 06/10/2022 06/05/2022 05/27/2022 05/10/2022    Chemistries                 Glucose mg/dL 83   89     91      BUN mg/dL 9   9     8 (L)      Creatinine mg/dL 0.94   0.80     1.09      BUN/Creatinine ratio 9.6   11.3     7.3      Sodium mmol/L 139   143     139      Potassium mmol/L 4.2   4.2     4.3      Chloride mmol/L 106   110 (H)     106      CO2 mmol/L 24.4   24.7     24.0      Anion gap, mmol/L 8.6   8.3     9.0      Calcium mg/dL 9.6   9.7     9.9      Albumin g/dL 4.3   4.1     4.1      Total protein g/dL 7.3   7.0     7.1      A/G ratio 1.4   1.4     1.4      Bilirubin, total mg/dL 5.1 (H)   3.6 (H)     5.8 (H)      Alkaline phosphatase U/L 82   81     82      AST/SGOT U/L 43 (H)   30     28      ALT/SGPT U/L 25   23     11      GFR non-African American, estimated mL/min/1.73m2 >60.0   >60.0     >60.0      GFR African American, estimated mL/min/1.73m2 >60.0   >60.0     >60.0                 Lab Results 07/29/2022 07/26/2022 06/10/2022 06/05/2022 05/27/2022 05/10/2022    Anemia another 30 days. Of note, I have to fill his very sparingly and weekly, because he has had a history of overdosing. Per the patient's father and he stated this in front of Javier, he likes to take narcotics after he works out because he does not feel like he should have a muscle or joint pain. The father states that when he asks Javier to do anything around the house he cries, goes to his room, plays video games or goes to the ER. While some of this are social issues, they are also behavioral issues and impairing his care. I did note that he stated that he was out of narcotics for 3 days, did not have withdrawal, and did not need to go to the emergency department. This makes me feel that he does not need the amount of narcotics that he claims he does. Furthermore, I stopped his MS Contin and he Appeared to do well on oxycodone only. -I am not denying that he occasionally needs pain medication, but I feel he needs a pain specialist.  -Bryan Melo has spoken with 2 different pain management centers and is unable to get an appointment.  -Referral has been sent to . He does not yet have an appointment. We discussed that we are going to stop refilling his narcotics if he does not make a conscious effort to get an appointment. A referral was sent to  on 7/11/2022. Secondary hemochromatosis:  -Last ferritin was 1096.  -This has been quite stable. Pain:  -Pain contract resigned 1/13/2022  -Pain plan updated 5/10/2022  -Oxycodone is prescribed weekly, because he will take them too quickly if it is not administered in this manner  -He has not yet been able to establish with a pain management practice. -Most recently, referral was sent to  pain management. He does not yet have an appointment.  -We discussed again that we are not going to continue to fill his narcotics.   -He has 30 days to get an appointment.  -No refill needed today    Compliance:  -This has been a big issue  -He refuses to get counseling  -He does not want and refuses a support group  -He has not been able to find a pain specialist and was fired by at least 1  -See above    Return to clinic in 1 month

## 2022-08-28 NOTE — PROGRESS NOTES
MD Elodia Fox made aware of pt requesting more pain medication via PS. Pt was offered his other PRN medications and he refused.

## 2022-08-28 NOTE — CONSULTS
Consult placed    [address] [place] [address],  Time:7:48 AM        Electronically signed by Bong Tran on 8/28/2022 at 7:48 AM

## 2022-08-28 NOTE — PROGRESS NOTES
Medicine Note  Spoke with patient who is refusing his labs due to not getting adequate pain medication. I admitted him due to concern for evolving acute chest syndrome, however he is not permitting lab work for me to monitor this. I will not just give IV dilaudid as was ordered in the ED. I did discuss with him that I increased his oxycodone to 10mg for moderate pain and 20mg for severe pain and he can still have the IV toradol. He refused lab work so I had to stop the IV heparin, and I changed him to subcut lovenox. I need to evaluate his labs and he has an EKG at 0630. He is threatening to leave AMA.     Electronically signed by Kapil Kowalski MD on 8/28/2022 at 4:28 AM

## 2022-08-29 LAB
ANION GAP SERPL CALCULATED.3IONS-SCNC: 11 MMOL/L (ref 3–16)
ANISOCYTOSIS: ABNORMAL
BASOPHILS ABSOLUTE: 0 K/UL (ref 0–0.2)
BASOPHILS ABSOLUTE: 0 K/UL (ref 0–0.2)
BASOPHILS RELATIVE PERCENT: 0 %
BASOPHILS RELATIVE PERCENT: 0 %
BUN BLDV-MCNC: 7 MG/DL (ref 7–20)
CALCIUM SERPL-MCNC: 10.3 MG/DL (ref 8.3–10.6)
CHLORIDE BLD-SCNC: 104 MMOL/L (ref 99–110)
CO2: 22 MMOL/L (ref 21–32)
CREAT SERPL-MCNC: 0.7 MG/DL (ref 0.9–1.3)
EOSINOPHILS ABSOLUTE: 0 K/UL (ref 0–0.6)
EOSINOPHILS ABSOLUTE: 0.4 K/UL (ref 0–0.6)
EOSINOPHILS RELATIVE PERCENT: 0 %
EOSINOPHILS RELATIVE PERCENT: 3 %
GFR AFRICAN AMERICAN: >60
GFR NON-AFRICAN AMERICAN: >60
GLUCOSE BLD-MCNC: 121 MG/DL (ref 70–99)
HCT VFR BLD CALC: 19.8 % (ref 40.5–52.5)
HCT VFR BLD CALC: 24.1 % (ref 40.5–52.5)
HEMATOLOGY PATH CONSULT: NO
HEMATOLOGY PATH CONSULT: NO
HEMOGLOBIN: 7.2 G/DL (ref 13.5–17.5)
HEMOGLOBIN: 8.2 G/DL (ref 13.5–17.5)
HOWELL-JOLLY BODIES: ABNORMAL
IMMATURE RETIC FRACT: 0.63 (ref 0.21–0.37)
L. PNEUMOPHILA SEROGP 1 UR AG: NORMAL
LYMPHOCYTES ABSOLUTE: 1.8 K/UL (ref 1–5.1)
LYMPHOCYTES ABSOLUTE: 4.5 K/UL (ref 1–5.1)
LYMPHOCYTES RELATIVE PERCENT: 13 %
LYMPHOCYTES RELATIVE PERCENT: 31 %
MCH RBC QN AUTO: 32.1 PG (ref 26–34)
MCH RBC QN AUTO: 34 PG (ref 26–34)
MCHC RBC AUTO-ENTMCNC: 33.9 G/DL (ref 31–36)
MCHC RBC AUTO-ENTMCNC: 36.3 G/DL (ref 31–36)
MCV RBC AUTO: 93.5 FL (ref 80–100)
MCV RBC AUTO: 94.7 FL (ref 80–100)
MONOCYTES ABSOLUTE: 1.4 K/UL (ref 0–1.3)
MONOCYTES ABSOLUTE: 2 K/UL (ref 0–1.3)
MONOCYTES RELATIVE PERCENT: 10 %
MONOCYTES RELATIVE PERCENT: 14 %
NEUTROPHILS ABSOLUTE: 10.2 K/UL (ref 1.7–7.7)
NEUTROPHILS ABSOLUTE: 8.1 K/UL (ref 1.7–7.7)
NEUTROPHILS RELATIVE PERCENT: 56 %
NEUTROPHILS RELATIVE PERCENT: 73 %
NUCLEATED RED BLOOD CELLS: 2 /100 WBC
OVALOCYTES: ABNORMAL
OVALOCYTES: ABNORMAL
PDW BLD-RTO: 20.6 % (ref 12.4–15.4)
PDW BLD-RTO: 22.3 % (ref 12.4–15.4)
PLATELET # BLD: 397 K/UL (ref 135–450)
PLATELET # BLD: 420 K/UL (ref 135–450)
PLATELET SLIDE REVIEW: ADEQUATE
PMV BLD AUTO: 7.8 FL (ref 5–10.5)
PMV BLD AUTO: 8.2 FL (ref 5–10.5)
POIKILOCYTES: ABNORMAL
POLYCHROMASIA: ABNORMAL
POLYCHROMASIA: ABNORMAL
POTASSIUM REFLEX MAGNESIUM: 4.2 MMOL/L (ref 3.5–5.1)
PRO-BNP: 8 PG/ML (ref 0–124)
PROCALCITONIN: 0.09 NG/ML (ref 0–0.15)
RBC # BLD: 2.12 M/UL (ref 4.2–5.9)
RBC # BLD: 2.54 M/UL (ref 4.2–5.9)
RETICULOCYTE ABSOLUTE COUNT: 0.31 M/UL
RETICULOCYTE COUNT PCT: 12.13 % (ref 0.5–2.18)
SICKLE CELLS: ABNORMAL
SICKLE CELLS: ABNORMAL
SLIDE REVIEW: ABNORMAL
SODIUM BLD-SCNC: 137 MMOL/L (ref 136–145)
STREP PNEUMONIAE ANTIGEN, URINE: NORMAL
TARGET CELLS: ABNORMAL
TARGET CELLS: ABNORMAL
WBC # BLD: 14 K/UL (ref 4–11)
WBC # BLD: 14.4 K/UL (ref 4–11)

## 2022-08-29 PROCEDURE — 2580000003 HC RX 258: Performed by: INTERNAL MEDICINE

## 2022-08-29 PROCEDURE — 6370000000 HC RX 637 (ALT 250 FOR IP): Performed by: INTERNAL MEDICINE

## 2022-08-29 PROCEDURE — 80048 BASIC METABOLIC PNL TOTAL CA: CPT

## 2022-08-29 PROCEDURE — 36415 COLL VENOUS BLD VENIPUNCTURE: CPT

## 2022-08-29 PROCEDURE — 85045 AUTOMATED RETICULOCYTE COUNT: CPT

## 2022-08-29 PROCEDURE — 99232 SBSQ HOSP IP/OBS MODERATE 35: CPT | Performed by: NURSE PRACTITIONER

## 2022-08-29 PROCEDURE — 84145 PROCALCITONIN (PCT): CPT

## 2022-08-29 PROCEDURE — 6360000002 HC RX W HCPCS: Performed by: INTERNAL MEDICINE

## 2022-08-29 PROCEDURE — 1200000000 HC SEMI PRIVATE

## 2022-08-29 PROCEDURE — 83880 ASSAY OF NATRIURETIC PEPTIDE: CPT

## 2022-08-29 PROCEDURE — 85025 COMPLETE CBC W/AUTO DIFF WBC: CPT

## 2022-08-29 RX ORDER — SODIUM CHLORIDE 450 MG/100ML
INJECTION, SOLUTION INTRAVENOUS CONTINUOUS
Status: DISCONTINUED | OUTPATIENT
Start: 2022-08-29 | End: 2022-08-30 | Stop reason: HOSPADM

## 2022-08-29 RX ADMIN — HYDROMORPHONE HYDROCHLORIDE 1 MG: 2 INJECTION, SOLUTION INTRAMUSCULAR; INTRAVENOUS; SUBCUTANEOUS at 13:20

## 2022-08-29 RX ADMIN — PANTOPRAZOLE SODIUM 40 MG: 40 TABLET, DELAYED RELEASE ORAL at 06:44

## 2022-08-29 RX ADMIN — OXYCODONE HYDROCHLORIDE 20 MG: 15 TABLET ORAL at 12:08

## 2022-08-29 RX ADMIN — SODIUM CHLORIDE: 4.5 INJECTION, SOLUTION INTRAVENOUS at 21:59

## 2022-08-29 RX ADMIN — CEFTRIAXONE SODIUM 1000 MG: 1 INJECTION, POWDER, FOR SOLUTION INTRAMUSCULAR; INTRAVENOUS at 23:25

## 2022-08-29 RX ADMIN — FOLIC ACID 2 MG: 1 TABLET ORAL at 08:17

## 2022-08-29 RX ADMIN — SODIUM CHLORIDE: 9 INJECTION, SOLUTION INTRAVENOUS at 00:46

## 2022-08-29 RX ADMIN — HYDROMORPHONE HYDROCHLORIDE 1 MG: 2 INJECTION, SOLUTION INTRAMUSCULAR; INTRAVENOUS; SUBCUTANEOUS at 17:41

## 2022-08-29 RX ADMIN — SODIUM CHLORIDE, PRESERVATIVE FREE 10 ML: 5 INJECTION INTRAVENOUS at 21:56

## 2022-08-29 RX ADMIN — CEFTRIAXONE SODIUM 1000 MG: 1 INJECTION, POWDER, FOR SOLUTION INTRAMUSCULAR; INTRAVENOUS at 00:47

## 2022-08-29 RX ADMIN — HYDROMORPHONE HYDROCHLORIDE 1 MG: 2 INJECTION, SOLUTION INTRAMUSCULAR; INTRAVENOUS; SUBCUTANEOUS at 08:17

## 2022-08-29 RX ADMIN — AZITHROMYCIN MONOHYDRATE 500 MG: 250 TABLET ORAL at 23:14

## 2022-08-29 RX ADMIN — OXYCODONE HYDROCHLORIDE 20 MG: 15 TABLET ORAL at 16:47

## 2022-08-29 RX ADMIN — OXYCODONE HYDROCHLORIDE 20 MG: 15 TABLET ORAL at 02:15

## 2022-08-29 RX ADMIN — ASPIRIN 81 MG: 81 TABLET, COATED ORAL at 08:17

## 2022-08-29 RX ADMIN — OXYCODONE HYDROCHLORIDE 20 MG: 15 TABLET ORAL at 20:12

## 2022-08-29 RX ADMIN — SODIUM CHLORIDE: 4.5 INJECTION, SOLUTION INTRAVENOUS at 10:59

## 2022-08-29 RX ADMIN — KETOROLAC TROMETHAMINE 30 MG: 30 INJECTION, SOLUTION INTRAMUSCULAR; INTRAVENOUS at 04:32

## 2022-08-29 RX ADMIN — OXYCODONE 10 MG: 5 TABLET ORAL at 06:48

## 2022-08-29 RX ADMIN — HYDROMORPHONE HYDROCHLORIDE 1 MG: 2 INJECTION, SOLUTION INTRAMUSCULAR; INTRAVENOUS; SUBCUTANEOUS at 21:55

## 2022-08-29 RX ADMIN — SODIUM CHLORIDE, PRESERVATIVE FREE 10 ML: 5 INJECTION INTRAVENOUS at 08:18

## 2022-08-29 RX ADMIN — HYDROMORPHONE HYDROCHLORIDE 1 MG: 2 INJECTION, SOLUTION INTRAMUSCULAR; INTRAVENOUS; SUBCUTANEOUS at 00:23

## 2022-08-29 ASSESSMENT — PAIN DESCRIPTION - LOCATION
LOCATION: GENERALIZED
LOCATION: GENERALIZED
LOCATION: CHEST

## 2022-08-29 ASSESSMENT — PAIN SCALES - GENERAL
PAINLEVEL_OUTOF10: 7
PAINLEVEL_OUTOF10: 7
PAINLEVEL_OUTOF10: 8
PAINLEVEL_OUTOF10: 8
PAINLEVEL_OUTOF10: 7
PAINLEVEL_OUTOF10: 5
PAINLEVEL_OUTOF10: 8
PAINLEVEL_OUTOF10: 7
PAINLEVEL_OUTOF10: 8
PAINLEVEL_OUTOF10: 8
PAINLEVEL_OUTOF10: 7

## 2022-08-29 ASSESSMENT — PAIN - FUNCTIONAL ASSESSMENT
PAIN_FUNCTIONAL_ASSESSMENT: ACTIVITIES ARE NOT PREVENTED
PAIN_FUNCTIONAL_ASSESSMENT: ACTIVITIES ARE NOT PREVENTED
PAIN_FUNCTIONAL_ASSESSMENT: PREVENTS OR INTERFERES SOME ACTIVE ACTIVITIES AND ADLS
PAIN_FUNCTIONAL_ASSESSMENT: ACTIVITIES ARE NOT PREVENTED

## 2022-08-29 ASSESSMENT — PAIN DESCRIPTION - ORIENTATION
ORIENTATION: MID

## 2022-08-29 ASSESSMENT — PAIN DESCRIPTION - DESCRIPTORS
DESCRIPTORS: ACHING;SHARP

## 2022-08-29 ASSESSMENT — PAIN DESCRIPTION - PAIN TYPE: TYPE: ACUTE PAIN

## 2022-08-29 ASSESSMENT — PAIN DESCRIPTION - FREQUENCY: FREQUENCY: CONTINUOUS

## 2022-08-29 NOTE — PROGRESS NOTES
Blood completed successfully with no observable adverse reaction with stable VS. Will continue to monitor

## 2022-08-29 NOTE — PROGRESS NOTES
Blood transfusion, blood vein at 2155 this RN stayed with patient for 15 mins. VSS after 15 mins.  Will continue to monitor

## 2022-08-29 NOTE — PROGRESS NOTES
Hospitalist Progress Note      PCP: Jomar Mckinley MD    Date of Admission: 8/27/2022    Chief Complaint: chest pain       Subjective:  He is still having chest pain. Medications:  Reviewed    Infusion Medications    sodium chloride      sodium chloride      sodium chloride 125 mL/hr at 08/28/22 1748    sodium chloride       Scheduled Medications    docusate sodium  100 mg Oral Daily    folic acid  2 mg Oral Daily    pantoprazole  40 mg Oral QAM AC    sodium chloride flush  5-40 mL IntraVENous 2 times per day    cefTRIAXone (ROCEPHIN) IV  1,000 mg IntraVENous Q24H    And    azithromycin  500 mg Oral Q24H    aspirin  81 mg Oral Daily     PRN Meds: oxyCODONE **OR** oxyCODONE, sodium chloride, sodium chloride, HYDROmorphone, perflutren lipid microspheres, ketorolac, sodium chloride flush, sodium chloride, ondansetron **OR** ondansetron, polyethylene glycol, acetaminophen **OR** acetaminophen, albuterol sulfate HFA      Intake/Output Summary (Last 24 hours) at 8/28/2022 2108  Last data filed at 8/28/2022 1954  Gross per 24 hour   Intake 3706.52 ml   Output 2600 ml   Net 1106.52 ml       Physical Exam Performed:    /63   Pulse 83   Temp 98.4 °F (36.9 °C) (Oral)   Resp 18   Ht 5' 9\" (1.753 m)   Wt 193 lb 14.4 oz (88 kg)   SpO2 94%   BMI 28.63 kg/m²     General appearance: No apparent distress, appears stated age and cooperative. HEENT: Pupils equal, round, and reactive to light. Conjunctivae/corneas clear. Neck: Supple, with full range of motion. No jugular venous distention. Trachea midline. Respiratory:  Normal respiratory effort. Clear to auscultation, bilaterally without Rales/Wheezes/Rhonchi. Cardiovascular: Regular rate and rhythm with normal S1/S2 without murmurs, rubs or gallops. Abdomen: Soft, non-tender, non-distended with normal bowel sounds. Musculoskeletal: No clubbing, cyanosis or edema bilaterally. Full range of motion without deformity.   Skin: Skin color, texture, turgor normal.  No rashes or lesions. Neurologic:  Neurovascularly intact without any focal sensory/motor deficits. Cranial nerves: II-XII intact, grossly non-focal.  Psychiatric: Alert and oriented, thought content appropriate, normal insight. Flat affect. Capillary Refill: Brisk,3 seconds, normal   Peripheral Pulses: +2 palpable, equal bilaterally       Labs:   Recent Labs     08/27/22  1410 08/28/22  0648   WBC 17.6* 14.0*   HGB 7.6* 7.2*   HCT 21.1* 19.8*    397     Recent Labs     08/27/22  1410      K 3.9      CO2 24   BUN 8   CREATININE 0.6*   CALCIUM 9.1     Recent Labs     08/27/22  1410   AST 38*   ALT 29   BILITOT 5.9*   ALKPHOS 93     Recent Labs     08/28/22  0648   INR 1.24*     Recent Labs     08/28/22  0648 08/28/22  0941 08/28/22  1620   TROPONINI <0.01 <0.01 <0.01       Urinalysis:      Lab Results   Component Value Date/Time    NITRU Negative 05/27/2022 07:00 AM    WBCUA 0-2 05/27/2022 07:00 AM    BACTERIA 2+ 05/27/2022 07:00 AM    RBCUA 3-4 05/27/2022 07:00 AM    BLOODU SMALL 05/27/2022 07:00 AM    SPECGRAV 1.015 05/27/2022 07:00 AM    GLUCOSEU Negative 05/27/2022 07:00 AM       Radiology:  CT CHEST PULMONARY EMBOLISM W CONTRAST   Final Result   1. No pulmonary embolism. 2. Patchy peripheral ground-glass opacities in the lower lobes which could   reflect atelectasis, pneumonitis or viral pneumonia. 3. Stable cardiomegaly. RECOMMENDATIONS:   Unavailable         XR CHEST (2 VW)   Final Result   No acute cardiopulmonary disease. Assessment/Plan:    Active Hospital Problems    Diagnosis     Abnormal EKG [R94.31]      Priority: Medium    Chest pain [R07.9]      Priority: Medium    History of DVT in adulthood [Z86.718]     Sickle cell anemia (HCC) [D57.1]        \"This is a 20 y.o. AAM with PMHx sig for sickle cell anemia, hx of recent priapism due to sickle cell, asthma, hx of opiate overdose who comes in c/o low grade fever to 100, chest pain and cough. Pt is COVID19 neg, but does have infiltrates on his chest CT. \"  Procalcitonin negative. Atypical chest pain. F/u TTE per cardiology. I do have some concern for acute chest syndrome, defer to hematology whether more aggressive transfusion will be necessary. Sickle cell disease, anemia. Analgesia, IVFs. 1u pRBCs. Infiltrate, cough, low-grade fever. COVID negative. Procalcitonin negative, if remains negative then will consider stopping abx, especially if hematology does not suspect acute chest syndrome. F/u rapid flu. DVT Prophylaxis: SCDs  Diet: ADULT DIET; Regular  Code Status: Full Code    PT/OT Eval Status: not indicated    Dispo - home when his pain has improved. Perhaps 8/30.       Phuc Ferguson MD

## 2022-08-29 NOTE — DISCHARGE SUMMARY
Hospital Medicine Progress note (awaiting echo)  Patient ID: Anel Jain      Patient's PCP: Martinez Hoff MD    Admit Date: 8/27/2022     Discharge Date:   08/29/22     Admitting Provider: Eleanor Jimenez MD     Discharge Provider: Batool Garcia MD     Discharge Diagnoses: Active Hospital Problems    Diagnosis     Abnormal EKG [R94.31]      Priority: Medium    Chest pain [R07.9]      Priority: Medium    History of DVT in adulthood [Z86.718]     Sickle cell anemia (HCC) [D57.1]        The patient was seen and examined on day of discharge and this discharge summary is in conjunction with any daily progress note from day of discharge. Hospital Course: \"This is a 20 y.o. AAM with PMHx sig for sickle cell anemia, hx of recent priapism due to sickle cell, asthma, hx of opiate overdose who comes in c/o low grade fever to 100, chest pain and cough. Pt is COVID19 neg, but does have infiltrates on his chest CT. \"  Procalcitonin negative. Atypical chest pain. Cardiology ordered TTE, pending at the time of this note. Possible mild case of acute chest syndrome, he improved quickly. Sickle cell disease, anemia. Analgesia, IVFs. 1u pRBCs on 8/28. Infiltrate, cough, recent low-grade fever. COVID negative. Procalcitonin repeatedly negative. Negative rapid flu. perhaps this was due to his acute chest syndrome. He recovered quickly and no further abx will ne necessary after discharge. Physical Exam Performed:     /60   Pulse 72   Temp 98.5 °F (36.9 °C) (Oral)   Resp 20   Ht 5' 9\" (1.753 m)   Wt 193 lb 11.2 oz (87.9 kg)   SpO2 93%   BMI 28.60 kg/m²       General appearance:  No apparent distress, appears stated age and cooperative. HEENT:  Normal cephalic, atraumatic without obvious deformity. Pupils equal, round, and reactive to light. Extra ocular muscles intact. Conjunctivae/corneas clear. Neck: Supple, with full range of motion.  No jugular venous distention. Trachea midline. Respiratory:  Normal respiratory effort. Clear to auscultation, bilaterally without Rales/Wheezes/Rhonchi. Cardiovascular:  Regular rate and rhythm with normal S1/S2 without murmurs, rubs or gallops. Abdomen: Soft, non-tender, non-distended with normal bowel sounds. Musculoskeletal:  No clubbing, cyanosis or edema bilaterally. Full range of motion without deformity. Skin: Skin color, texture, turgor normal.  No rashes or lesions. Neurologic:  Neurovascularly intact without any focal sensory/motor deficits. Cranial nerves: II-XII intact, grossly non-focal.  Psychiatric:  Alert and oriented, thought content appropriate, normal insight. Flat affect, psychomotor slowing. Capillary Refill: Brisk,< 3 seconds   Peripheral Pulses: +2 palpable, equal bilaterally       Labs: For convenience and continuity at follow-up the following most recent labs are provided:      CBC:    Lab Results   Component Value Date/Time    WBC 14.4 08/29/2022 02:12 AM    HGB 8.2 08/29/2022 02:12 AM    HCT 24.1 08/29/2022 02:12 AM     08/29/2022 02:12 AM       Renal:    Lab Results   Component Value Date/Time     08/29/2022 02:12 AM    K 4.2 08/29/2022 02:12 AM     08/29/2022 02:12 AM    CO2 22 08/29/2022 02:12 AM    BUN 7 08/29/2022 02:12 AM    CREATININE 0.7 08/29/2022 02:12 AM    CALCIUM 10.3 08/29/2022 02:12 AM    PHOS 4.5 10/20/2021 06:39 AM         Significant Diagnostic Studies    Radiology:   CT CHEST PULMONARY EMBOLISM W CONTRAST   Final Result   1. No pulmonary embolism. 2. Patchy peripheral ground-glass opacities in the lower lobes which could   reflect atelectasis, pneumonitis or viral pneumonia. 3. Stable cardiomegaly. RECOMMENDATIONS:   Unavailable         XR CHEST (2 VW)   Final Result   No acute cardiopulmonary disease.                 Consults:     IP CONSULT TO HOSPITALIST  IP CONSULT TO ONCOLOGY  IP CONSULT TO CARDIOLOGY    Disposition:  home     Condition at Discharge: Stable    Discharge Instructions/Follow-up:  Follow up with PCP within 1-2 weeks. Code Status:  Full Code     Activity: activity as tolerated    Diet: encourage fluids      Discharge Medications:     Current Discharge Medication List             Details   morphine (MS CONTIN) 15 MG extended release tablet       DULoxetine (CYMBALTA) 30 MG extended release capsule       apixaban (ELIQUIS) 5 MG TABS tablet Take 1 tablet by mouth 2 times daily  Qty: 60 tablet, Refills: 0      pantoprazole (PROTONIX) 40 MG tablet Take 1 tablet by mouth every morning (before breakfast)  Qty: 30 tablet, Refills: 0      folic acid (FOLVITE) 1 MG tablet Take 2 tablets by mouth daily  Qty: 30 tablet, Refills: 3      oxyCODONE HCl (OXY-IR) 10 MG immediate release tablet Take 10 mg by mouth every 4 hours as needed for Pain. albuterol sulfate HFA (PROVENTIL;VENTOLIN;PROAIR) 108 (90 Base) MCG/ACT inhaler Albuterol HFA Inhaler 90 mcg/actuation  inhaled  2 puffs prn     Active      levalbuterol (XOPENEX) 0.31 MG/3ML nebulization Levalbuterol Nebulized    2 puffs prn     Active      docusate (COLACE, DULCOLAX) 100 MG CAPS Take 100 mg by mouth      ibuprofen (ADVIL;MOTRIN) 800 MG tablet ibuprofen 800 MG Oral Tablet  oral   prn    Active      hydroxyurea (HYDREA) 500 MG chemo capsule Take 1,000 mg by mouth daily   Qty:                 Time Spent on discharge is more than 30 minutes in the examination, evaluation, counseling and review of medications and discharge plan. Signed:    Maninder Tony MD   8/29/2022      Thank you Sophie Griggs MD for the opportunity to be involved in this patient's care. If you have any questions or concerns, please feel free to contact me at [phone].

## 2022-08-29 NOTE — CARE COORDINATION
Discharge order noted. Pt from home with parents. IPTA. Denies dcp needs. CM signing off, but remains available if needs arise.  Nurys Tsai RN

## 2022-08-29 NOTE — PROGRESS NOTES
ONCOLOGY HEMATOLOGY CARE PROGRESS NOTE      SUBJECTIVE:     The patient states that he feels that he can go home today. His chest pain is resolved. ROS:     Constitutional:  No weight loss, No fever, No chills, No night sweats. Energy level good. Eyes:  No impairment or change in vision  ENT / Mouth:  No pain, abnormal ulceration, bleeding, nasal drip or change in voice or hearing  Cardiovascular:  No chest pain, palpitations, new edema, or calf discomfort  Respiratory:  No pain, hemoptysis, change to breathing  Breast:  No pain, discharge, change in appearance or texture  Gastrointestinal:  No pain, cramping, jaundice, change to eating and bowel habits  Urinary:  No pain, bleeding or change in continence  Genitalia: No pain, bleeding or discharge  Musculoskeletal:  No redness, pain, edema or weakness  Skin:  No pruritus, rash, change to nodules or lesions  Neurologic:  No discomfort, change in mental status, speech, sensory or motor activity  Psychiatric:  No change in concentration or change to affect or mood  Endocrine:  No hot flashes, increased thirst, or change to urine production  Hematologic: No petechiae, ecchymosis or bleeding  Lymphatic:  No lymphadenopathy or lymphedema  Allergy / Immunologic:  No eczema, hives, frequent or recurrent infections    OBJECTIVE        Physical    VITALS:  /61   Pulse 75   Temp 98.6 °F (37 °C) (Oral)   Resp 18   Ht 5' 9\" (1.753 m)   Wt 193 lb 11.2 oz (87.9 kg)   SpO2 94%   BMI 28.60 kg/m²   TEMPERATURE:  Current - Temp: 98.6 °F (37 °C);  Max - Temp  Av.4 °F (36.9 °C)  Min: 98 °F (36.7 °C)  Max: 98.7 °F (37.1 °C)  PULSE OXIMETRY RANGE: SpO2  Av.6 %  Min: 93 %  Max: 95 %  24HR INTAKE/OUTPUT:    Intake/Output Summary (Last 24 hours) at 2022 190  Last data filed at 2022 9385  Gross per 24 hour   Intake 1528.96 ml   Output 1950 ml   Net -421.04 ml       CONSTITUTIONAL: awake, alert, cooperative, no apparent distress   EYES: pupils equal, round and reactive to light, sclera clear and conjunctiva normal  ENT: Normocephalic, without obvious abnormality, atraumatic  NECK: supple, symmetrical, no jugular venous distension and no carotid bruits   HEMATOLOGIC/LYMPHATIC: no cervical, supraclavicular or axillary lymphadenopathy   LUNGS: no increased work of breathing and clear to auscultation   CARDIOVASCULAR: regular rate and rhythm, normal S1 and S2, no murmur noted  ABDOMEN: normal bowel sounds x 4, soft, non-distended, non-tender, no masses palpated, no hepatosplenomgaly   MUSCULOSKELETAL: full range of motion noted, tone is normal  NEUROLOGIC: awake, alert, oriented to name, place and time. Motor skills grossly intact. SKIN: Normal skin color, texture, turgor and no jaundice. appears intact   EXTREMITIES: no LE edema       Data  Recent Labs     08/29/22  0212 08/28/22  2149 08/28/22  0648 08/27/22  1410   WBC 14.4*  --  14.0* 17.6*   NEUTROABS 8.1*  --  10.2* 12.0*   LYMPHOPCT 31.0  --  13.0 19.0   RBC 2.54*  --  2.12* 2.26*   HGB 8.2* 6.5* 7.2* 7.6*   HCT 24.1* 19.0* 19.8* 21.1*   MCV 94.7  --  93.5 93.5   MCH 32.1  --  34.0 33.4   MCHC 33.9  --  36.3* 35.7   RDW 20.6*  --  22.3* 22.3*     --  397 424        Recent Labs     08/27/22  1410 08/29/22  0212    137   K 3.9 4.2    104   CO2 24 22   BUN 8 7   CREATININE 0.6* 0.7*     Recent Labs     08/27/22  1410   AST 38*   ALT 29   BILITOT 5.9*   ALKPHOS 93       Magnesium:    Lab Results   Component Value Date/Time    MG 1.90 05/24/2022 05:43 AM    MG 1.90 05/03/2022 05:04 AM    MG 2.20 05/02/2022 06:18 AM       Imaging  CT CHEST PULMONARY EMBOLISM W CONTRAST  Narrative: EXAMINATION:  CTA OF THE CHEST 8/27/2022 4:20 pm    TECHNIQUE:  CTA of the chest was performed after the administration of intravenous  contrast.  Multiplanar reformatted images are provided for review. MIP  images are provided for review.  Automated exposure control, iterative  reconstruction, and/or weight based adjustment of the mA/kV was utilized to  reduce the radiation dose to as low as reasonably achievable. COMPARISON:  Radiographs 08/27/2022 and CT 05/02/2022. HISTORY:  ORDERING SYSTEM PROVIDED HISTORY: chest pain  TECHNOLOGIST PROVIDED HISTORY:  Reason for exam:->chest pain  Decision Support Exception - unselect if not a suspected or confirmed  emergency medical condition->Emergency Medical Condition (MA)  Reason for Exam: chest pain-sob since tuesday-fever    FINDINGS:  Pulmonary Arteries: Pulmonary arteries are adequately opacified for  evaluation. No evidence of intraluminal filling defect to suggest pulmonary  embolism. Main pulmonary artery is normal in caliber. Mediastinum: There is no lymphadenopathy. Mild cardiomegaly appears  unchanged. There is no pericardial effusion. The    Lungs/pleura: There are mild patchy peripheral ground-glass opacities in the  lower lobes. The central airways are normally patent. There is no pleural  effusion. Upper Abdomen: Limited images of the upper abdomen are unremarkable. Soft Tissues/Bones: No fracture or destructive bone lesion is identified. Gynecomastia is again noted. Impression: 1. No pulmonary embolism. 2. Patchy peripheral ground-glass opacities in the lower lobes which could  reflect atelectasis, pneumonitis or viral pneumonia. 3. Stable cardiomegaly. RECOMMENDATIONS:  Unavailable  XR CHEST (2 VW)  Narrative: EXAMINATION:  TWO XRAY VIEWS OF THE CHEST    8/27/2022 2:32 pm    COMPARISON:  05/09/2022    HISTORY:  ORDERING SYSTEM PROVIDED HISTORY: pain  TECHNOLOGIST PROVIDED HISTORY:  Reason for exam:->pain  Reason for Exam: fever, chest pain and SOB for the past 5 days    FINDINGS:  The cardiac silhouette, mediastinal and hilar contours are normal.  The lungs  are clear. There are no pleural effusions. No acute osseous abnormalities are  identified.   Impression: No acute cardiopulmonary disease. Problem List  Patient Active Problem List   Diagnosis    Sickle cell pain crisis (HCC)    Asthma    Sickle cell anemia (HCC)    Sepsis ([facility] [address]    Opiate overdose ([facility])    Opiate antagonist poisoning, accidental or unintentional, initial encounter    Leukocytosis    Hypoxia    Anemia    Other chest pain    Pneumonia due to infectious organism    Fever    Chronic prescription opiate use    Right leg pain    Dental infection    Sickle cell crisis ([facility] [address]    History of DVT in adulthood    Overweight (BMI 25.0-29. 9)    Chest pain    Abnormal EKG       ASSESSMENT AND PLAN:    Sickle cell crisis:  -Questionable acute chest pain syndrome, but this was fleeting  -Relieved by IV fluids, analgesics and packed red blood cells  -He is doing very well this morning  -I agree with discharge  -I will call in his pain medication  -I discussed this with the patient and he agrees  -For the last several weeks he is really worked on being more compliant.           ONCOLOGIC DISPOSITION:  -To be followed up in clinic    Jomar Mckinley MD  Please contact through [facility]

## 2022-08-29 NOTE — PROGRESS NOTES
[facility]   Daily Progress Note    Admit Date:  8/27/2022  HPI:    Chief Complaint   Patient presents with    Fever    Shortness of Breath    Chest Pain     All sx since Tuesday      [facility] with complaints of chest pain/tightness and fever. The chest pain is constant, does not worsen with exertion. It does ease some with position changes. Pertinent past medical history of sickle cell disease, anemia, history of DVT and asthma. Subjective:  Mr. Elvira Byrd seen sitting up in bed denies any shortness of breath or edema. He continues to have sharp chest pain rating from the right side to the left side of his chest that is constant.     Objective:   Patient Vitals for the past 24 hrs:   BP Temp Temp src Pulse Resp SpO2 Weight   08/29/22 0847 -- -- -- -- 20 -- --   08/29/22 0815 118/60 -- -- -- -- -- --   08/29/22 0735 (!) 91/49 -- -- -- -- -- --   08/29/22 0730 (!) 95/45 98.5 °F (36.9 °C) Oral 72 20 93 % --   08/29/22 0630 -- -- -- -- -- -- 193 lb 11.2 oz (87.9 kg)   08/29/22 0422 112/66 98.4 °F (36.9 °C) Oral 77 16 95 % --   08/29/22 0245 -- -- -- -- 16 -- --   08/29/22 0130 104/62 98 °F (36.7 °C) Oral 77 16 94 % --   08/29/22 0053 -- -- -- -- 16 -- --   08/29/22 0045 (!) 101/58 98.4 °F (36.9 °C) -- 86 16 93 % --   08/29/22 0042 (!) 101/58 98.4 °F (36.9 °C) Oral 86 16 93 % --   08/28/22 2337 (!) 110/51 98.7 °F (37.1 °C) Oral 89 16 94 % --   08/28/22 2249 -- -- -- -- 16 -- --   08/28/22 2214 (!) 101/50 98.6 °F (37 °C) -- 77 16 94 % --   08/28/22 2158 102/64 98.5 °F (36.9 °C) Oral 77 16 94 % --   08/28/22 2146 (!) 109/47 98.3 °F (36.8 °C) Oral 78 15 93 % --   08/28/22 2130 -- 98.3 °F (36.8 °C) Oral 78 -- 93 % --   08/28/22 2115 -- 98.3 °F (36.8 °C) Oral 78 16 93 % --   08/28/22 2042 -- -- -- -- 16 -- --   08/28/22 1954 -- -- -- 83 -- -- --   08/28/22 1945 109/63 98.4 °F (36.9 °C) Oral 83 18 94 % --   08/28/22 1504 108/61 98.1 °F (36.7 °C) Oral 88 16 92 % --       Intake/Output Summary (Last 24 hours) at 8/29/2022 1216  Last data filed at 8/29/2022 2159  Gross per 24 hour   Intake 4315.48 ml   Output 2550 ml   Net 1765.48 ml     Wt Readings from Last 3 Encounters:   08/29/22 193 lb 11.2 oz (87.9 kg)   07/26/22 185 lb (83.9 kg)   06/19/22 185 lb (83.9 kg)         ASSESSMENT:   CHEST PAIN: atypical of angina  SICKLE CELL DISEASE: per IM/ [facility]  ACUTE CHEST SYNDROME  ANEMIA: s/p transfusion  ASTHMA      PLAN:  Echocardiogram pending  Troponin, BNP and EKG negative for ischemia  Will follow    Amanda Wilson, CARMELITA - CNP, 8/29/2022, 12:16 PM  [address]   [phone]       Telemetry: N/A  NYHA: III    Physical Exam:  General:  Awake, alert, NAD  Skin:  Warm and dry  Neck:  JVP normal  Chest: Clear to auscultation  Cardiovascular:  RRR, normal S1S2, no MRG  Abdomen:  Soft, nontender, +bowel sounds  Extremities: No BLE edema      Medications:    docusate sodium  100 mg Oral Daily    folic acid  2 mg Oral Daily    pantoprazole  40 mg Oral QAM AC    sodium chloride flush  5-40 mL IntraVENous 2 times per day    cefTRIAXone (ROCEPHIN) IV  1,000 mg IntraVENous Q24H    And    azithromycin  500 mg Oral Q24H    aspirin  81 mg Oral Daily      sodium chloride 100 mL/hr at 08/29/22 1059    sodium chloride      sodium chloride      sodium chloride Stopped (08/29/22 0211)       Lab Data: Lab results independently reviewed and analyzed by myself 8/29/2022    CBC:   Recent Labs     08/27/22  1410 08/28/22  0648 08/28/22  2149 08/29/22 0212   WBC 17.6* 14.0*  --  14.4*   HGB 7.6* 7.2* 6.5* 8.2*    397  --  420     BMP:    Recent Labs     08/27/22  1410 08/29/22 0212    137   K 3.9 4.2   CO2 24 22   BUN 8 7   CREATININE 0.6* 0.7*     INR:    Recent Labs     08/28/22  0648   INR 1.24*     BNP:    Recent Labs     08/29/22  0212   PROBNP 8     Cardiac Enzymes:   Recent Labs     08/28/22  0648 08/28/22  0941 08/28/22  1620   TROPONINI <0.01 <0.01 <0.01     Lipids: No results found for: TRIG, HDL, [address], [facility]    Cardiac Imaging:

## 2022-08-30 VITALS
TEMPERATURE: 99 F | BODY MASS INDEX: 28.69 KG/M2 | HEART RATE: 73 BPM | OXYGEN SATURATION: 96 % | WEIGHT: 193.7 LBS | RESPIRATION RATE: 16 BRPM | DIASTOLIC BLOOD PRESSURE: 56 MMHG | HEIGHT: 69 IN | SYSTOLIC BLOOD PRESSURE: 94 MMHG

## 2022-08-30 PROBLEM — D57.01: Status: ACTIVE | Noted: 2022-08-30

## 2022-08-30 LAB
LV EF: 58 %
LVEF MODALITY: NORMAL

## 2022-08-30 PROCEDURE — 99232 SBSQ HOSP IP/OBS MODERATE 35: CPT | Performed by: NURSE PRACTITIONER

## 2022-08-30 PROCEDURE — 6360000002 HC RX W HCPCS: Performed by: INTERNAL MEDICINE

## 2022-08-30 PROCEDURE — 6370000000 HC RX 637 (ALT 250 FOR IP): Performed by: INTERNAL MEDICINE

## 2022-08-30 PROCEDURE — 2580000003 HC RX 258: Performed by: INTERNAL MEDICINE

## 2022-08-30 PROCEDURE — 93306 TTE W/DOPPLER COMPLETE: CPT

## 2022-08-30 RX ADMIN — FOLIC ACID 2 MG: 1 TABLET ORAL at 08:27

## 2022-08-30 RX ADMIN — OXYCODONE HYDROCHLORIDE 20 MG: 15 TABLET ORAL at 01:17

## 2022-08-30 RX ADMIN — PANTOPRAZOLE SODIUM 40 MG: 40 TABLET, DELAYED RELEASE ORAL at 05:35

## 2022-08-30 RX ADMIN — DOCUSATE SODIUM 100 MG: 100 CAPSULE, LIQUID FILLED ORAL at 08:27

## 2022-08-30 RX ADMIN — OXYCODONE HYDROCHLORIDE 20 MG: 15 TABLET ORAL at 13:50

## 2022-08-30 RX ADMIN — OXYCODONE HYDROCHLORIDE 20 MG: 15 TABLET ORAL at 05:35

## 2022-08-30 RX ADMIN — ASPIRIN 81 MG: 81 TABLET, COATED ORAL at 08:27

## 2022-08-30 RX ADMIN — HYDROMORPHONE HYDROCHLORIDE 1 MG: 2 INJECTION, SOLUTION INTRAMUSCULAR; INTRAVENOUS; SUBCUTANEOUS at 02:20

## 2022-08-30 RX ADMIN — HYDROMORPHONE HYDROCHLORIDE 1 MG: 2 INJECTION, SOLUTION INTRAMUSCULAR; INTRAVENOUS; SUBCUTANEOUS at 15:00

## 2022-08-30 RX ADMIN — HYDROMORPHONE HYDROCHLORIDE 1 MG: 2 INJECTION, SOLUTION INTRAMUSCULAR; INTRAVENOUS; SUBCUTANEOUS at 10:57

## 2022-08-30 RX ADMIN — OXYCODONE HYDROCHLORIDE 20 MG: 15 TABLET ORAL at 09:43

## 2022-08-30 RX ADMIN — HYDROMORPHONE HYDROCHLORIDE 1 MG: 2 INJECTION, SOLUTION INTRAMUSCULAR; INTRAVENOUS; SUBCUTANEOUS at 06:58

## 2022-08-30 RX ADMIN — Medication 10 ML: at 15:01

## 2022-08-30 RX ADMIN — Medication 10 ML: at 10:57

## 2022-08-30 ASSESSMENT — PAIN DESCRIPTION - LOCATION
LOCATION: CHEST
LOCATION: GENERALIZED

## 2022-08-30 NOTE — PROGRESS NOTES
[address]   Daily Progress Note    Admit Date:  8/27/2022  HPI:    Chief Complaint   Patient presents with    Fever    Shortness of Breath    Chest Pain     All sx since Tuesday      [facility] with complaints of chest pain/tightness and fever. The chest pain is constant, does not worsen with exertion. It does ease some with position changes. Pertinent past medical history of sickle cell disease, anemia, history of DVT and asthma. Subjective:  Mr. Julian Allen reports chest pain is better though still present but lesser severity. Objective:   Patient Vitals for the past 24 hrs:   BP Temp Temp src Pulse Resp SpO2   08/30/22 0825 103/63 98.6 °F (37 °C) Oral 72 16 95 %   08/29/22 2012 100/60 97.8 °F (36.6 °C) Oral 74 16 93 %   08/29/22 1717 -- -- -- -- 18 --   08/29/22 1556 104/61 98.6 °F (37 °C) Oral 75 18 94 %   08/29/22 1350 -- -- -- -- 18 --   08/29/22 1238 -- -- -- -- 18 --         Intake/Output Summary (Last 24 hours) at 8/30/2022 1135  Last data filed at 8/30/2022 0945  Gross per 24 hour   Intake 960 ml   Output 1200 ml   Net -240 ml       Wt Readings from Last 3 Encounters:   08/29/22 193 lb 11.2 oz (87.9 kg)   07/26/22 185 lb (83.9 kg)   06/19/22 185 lb (83.9 kg)         ASSESSMENT:   CHEST PAIN: atypical of angina  SICKLE CELL DISEASE: per IM/ [facility]  ACUTE CHEST SYNDROME  ANEMIA: s/p transfusion  ASTHMA      PLAN:  Echocardiogram completed, results pending  Troponin, BNP and EKG negative for ischemia  If echocardiogram unremarkable, okay for discharge from a cardiac perspective.   Okay to discontinue the aspirin    CARMELITA Herrera CNP, 8/30/2022, 11:35 AM  [address]   [phone]       Telemetry: N/A  NYHA: III    Physical Exam:  General:  Awake, alert, NAD  Skin:  Warm and dry  Neck:  JVP normal  Chest: Clear to auscultation  Cardiovascular:  RRR, normal S1S2, no MRG  Abdomen:  Soft, nontender, +bowel sounds  Extremities: No BLE edema      Medications: docusate sodium  100 mg Oral Daily    folic acid  2 mg Oral Daily    pantoprazole  40 mg Oral QAM AC    sodium chloride flush  5-40 mL IntraVENous 2 times per day    cefTRIAXone (ROCEPHIN) IV  1,000 mg IntraVENous Q24H    aspirin  81 mg Oral Daily      sodium chloride 100 mL/hr at 08/29/22 2159    sodium chloride      sodium chloride      sodium chloride Stopped (08/29/22 0211)       Lab Data: Lab results independently reviewed and analyzed by myself 8/30/2022    CBC:   Recent Labs     08/27/22  1410 08/28/22  0648 08/28/22  2149 08/29/22 0212   WBC 17.6* 14.0*  --  14.4*   HGB 7.6* 7.2* 6.5* 8.2*    397  --  420       BMP:    Recent Labs     08/27/22  1410 08/29/22  0212    137   K 3.9 4.2   CO2 24 22   BUN 8 7   CREATININE 0.6* 0.7*       INR:    Recent Labs     08/28/22  0648   INR 1.24*       BNP:    Recent Labs     08/29/22  0212   PROBNP 8       Cardiac Enzymes:   Recent Labs     08/28/22  0648 08/28/22  0941 08/28/22  1620   TROPONINI <0.01 <0.01 <0.01       Lipids: No results found for: TRIG, HDL, LDLCALC, LDLDIRECT    Cardiac Imaging:

## 2022-08-30 NOTE — PROGRESS NOTES
ONCOLOGY HEMATOLOGY CARE PROGRESS NOTE      SUBJECTIVE:     The patient states he is waiting for an echocardiogram before going home. ROS:     Constitutional:  No weight loss, No fever, No chills, No night sweats. Energy level good. Eyes:  No impairment or change in vision  ENT / Mouth:  No pain, abnormal ulceration, bleeding, nasal drip or change in voice or hearing  Cardiovascular:  No chest pain, palpitations, new edema, or calf discomfort  Respiratory:  No pain, hemoptysis, change to breathing  Breast:  No pain, discharge, change in appearance or texture  Gastrointestinal:  No pain, cramping, jaundice, change to eating and bowel habits  Urinary:  No pain, bleeding or change in continence  Genitalia: No pain, bleeding or discharge  Musculoskeletal:  No redness, pain, edema or weakness  Skin:  No pruritus, rash, change to nodules or lesions  Neurologic:  No discomfort, change in mental status, speech, sensory or motor activity  Psychiatric:  No change in concentration or change to affect or mood  Endocrine:  No hot flashes, increased thirst, or change to urine production  Hematologic: No petechiae, ecchymosis or bleeding  Lymphatic:  No lymphadenopathy or lymphedema  Allergy / Immunologic:  No eczema, hives, frequent or recurrent infections    OBJECTIVE        Physical    VITALS:  /63   Pulse 72   Temp 98.6 °F (37 °C) (Oral)   Resp 16   Ht 5' 9\" (1.753 m)   Wt 193 lb 11.2 oz (87.9 kg)   SpO2 95%   BMI 28.60 kg/m²   TEMPERATURE:  Current - Temp: 98.6 °F (37 °C);  Max - Temp  Av.3 °F (36.8 °C)  Min: 97.8 °F (36.6 °C)  Max: 98.6 °F (37 °C)  PULSE OXIMETRY RANGE: SpO2  Av %  Min: 93 %  Max: 95 %  24HR INTAKE/OUTPUT:    Intake/Output Summary (Last 24 hours) at 2022 0931  Last data filed at 2022 2347  Gross per 24 hour   Intake 480 ml   Output 500 ml   Net -20 ml         CONSTITUTIONAL: awake, alert, cooperative, no apparent distress EYES: pupils equal, round and reactive to light, sclera clear and conjunctiva normal  ENT: Normocephalic, without obvious abnormality, atraumatic  NECK: supple, symmetrical, no jugular venous distension and no carotid bruits   HEMATOLOGIC/LYMPHATIC: no cervical, supraclavicular or axillary lymphadenopathy   LUNGS: no increased work of breathing and clear to auscultation   CARDIOVASCULAR: regular rate and rhythm, normal S1 and S2, no murmur noted  ABDOMEN: normal bowel sounds x 4, soft, non-distended, non-tender, no masses palpated, no hepatosplenomgaly   MUSCULOSKELETAL: full range of motion noted, tone is normal  NEUROLOGIC: awake, alert, oriented to name, place and time. Motor skills grossly intact. SKIN: Normal skin color, texture, turgor and no jaundice. appears intact   EXTREMITIES: no LE edema       Data  Recent Labs     08/29/22  0212 08/28/22  2149 08/28/22  0648 08/27/22  1410   WBC 14.4*  --  14.0* 17.6*   NEUTROABS 8.1*  --  10.2* 12.0*   LYMPHOPCT 31.0  --  13.0 19.0   RBC 2.54*  --  2.12* 2.26*   HGB 8.2* 6.5* 7.2* 7.6*   HCT 24.1* 19.0* 19.8* 21.1*   MCV 94.7  --  93.5 93.5   MCH 32.1  --  34.0 33.4   MCHC 33.9  --  36.3* 35.7   RDW 20.6*  --  22.3* 22.3*     --  397 424          Recent Labs     08/27/22  1410 08/29/22  0212    137   K 3.9 4.2    104   CO2 24 22   BUN 8 7   CREATININE 0.6* 0.7*       Recent Labs     08/27/22  1410   AST 38*   ALT 29   BILITOT 5.9*   ALKPHOS 93         Magnesium:    Lab Results   Component Value Date/Time    MG 1.90 05/24/2022 05:43 AM    MG 1.90 05/03/2022 05:04 AM    MG 2.20 05/02/2022 06:18 AM       Imaging  CT CHEST PULMONARY EMBOLISM W CONTRAST  Narrative: EXAMINATION:  CTA OF THE CHEST 8/27/2022 4:20 pm    TECHNIQUE:  CTA of the chest was performed after the administration of intravenous  contrast.  Multiplanar reformatted images are provided for review. MIP  images are provided for review.  Automated exposure control, iterative  reconstruction, and/or weight based adjustment of the mA/kV was utilized to  reduce the radiation dose to as low as reasonably achievable. COMPARISON:  Radiographs 08/27/2022 and CT 05/02/2022. HISTORY:  ORDERING SYSTEM PROVIDED HISTORY: chest pain  TECHNOLOGIST PROVIDED HISTORY:  Reason for exam:->chest pain  Decision Support Exception - unselect if not a suspected or confirmed  emergency medical condition->Emergency Medical Condition (MA)  Reason for Exam: chest pain-sob since tuesday-fever    FINDINGS:  Pulmonary Arteries: Pulmonary arteries are adequately opacified for  evaluation. No evidence of intraluminal filling defect to suggest pulmonary  embolism. Main pulmonary artery is normal in caliber. Mediastinum: There is no lymphadenopathy. Mild cardiomegaly appears  unchanged. There is no pericardial effusion. The    Lungs/pleura: There are mild patchy peripheral ground-glass opacities in the  lower lobes. The central airways are normally patent. There is no pleural  effusion. Upper Abdomen: Limited images of the upper abdomen are unremarkable. Soft Tissues/Bones: No fracture or destructive bone lesion is identified. Gynecomastia is again noted. Impression: 1. No pulmonary embolism. 2. Patchy peripheral ground-glass opacities in the lower lobes which could  reflect atelectasis, pneumonitis or viral pneumonia. 3. Stable cardiomegaly. RECOMMENDATIONS:  Unavailable  XR CHEST (2 VW)  Narrative: EXAMINATION:  TWO XRAY VIEWS OF THE CHEST    8/27/2022 2:32 pm    COMPARISON:  05/09/2022    HISTORY:  ORDERING SYSTEM PROVIDED HISTORY: pain  TECHNOLOGIST PROVIDED HISTORY:  Reason for exam:->pain  Reason for Exam: fever, chest pain and SOB for the past 5 days    FINDINGS:  The cardiac silhouette, mediastinal and hilar contours are normal.  The lungs  are clear. There are no pleural effusions. No acute osseous abnormalities are  identified.   Impression: No acute cardiopulmonary disease. Problem List  Patient Active Problem List   Diagnosis    Sickle cell pain crisis (HCC)    Asthma    Sickle cell anemia (HCC)    Sepsis ([facility] [address]    Opiate overdose (HCC)    Opiate antagonist poisoning, accidental or unintentional, initial encounter    Leukocytosis    Hypoxia    Anemia    Other chest pain    Pneumonia due to infectious organism    Fever    Chronic prescription opiate use    Right leg pain    Dental infection    Sickle cell crisis ([facility] [address]    History of DVT in adulthood    Overweight (BMI 25.0-29. 9)    Chest pain    Abnormal EKG       ASSESSMENT AND PLAN:    Sickle cell crisis:  -Questionable acute chest pain syndrome, but this was fleeting  -Relieved by IV fluids, analgesics and packed red blood cells  -He is doing very well this morning  -I agree with discharge  -I discussed this with the patient and he agrees  -For the last several weeks he is really worked on being more compliant.  -Echocardiogram is pending          ONCOLOGIC DISPOSITION:  -To be followed up in clinic  -His oxycodone prescription was called in through our computer system for easier tracking    Keira Jasmine MD  Please contact through [facility]

## 2022-08-30 NOTE — CARE COORDINATION
Discharge order noted. Pt from home with parents. IPTA. Denies dcp needs. CM signing off, but remains available if needs arise.  Nurys Victor RN

## 2022-08-30 NOTE — PROGRESS NOTES
DC instructions given, pt verbalized understanding. IV removed no complications. Belongings gathered.  Pt aware 1 RX located in OP pharmacy to be picked up on way out

## 2022-08-30 NOTE — DISCHARGE SUMMARY
Hospital Medicine Discharge Summary    Patient ID: [facility]      Patient's PCP: Gael Ovalle MD    Admit Date: 8/27/2022     Discharge Date:   08/30/22     Admitting Provider: Natalya Escalona MD     Discharge Provider: Bart Camarena MD     Discharge Diagnoses: Active Hospital Problems    Diagnosis     Acute chest syndrome due to sickle-cell disease ([address] [D57.01]      Priority: Medium    Abnormal EKG [R94.31]      Priority: Medium    Chest pain [R07.9]      Priority: Medium    History of DVT in adulthood [Z86.718]     Sickle cell anemia (HCC) [D57.1]        The patient was seen and examined on day of discharge and this discharge summary is in conjunction with any daily progress note from day of discharge. Hospital Course: \"This is a 20 y.o. AAM with PMHx sig for sickle cell anemia, hx of recent priapism due to sickle cell, asthma, hx of opiate overdose who comes in c/o low grade fever to 100, chest pain and cough. Pt is COVID19 neg, but does have infiltrates on his chest CT. \"  Procalcitonin negative. Atypical chest pain. Cardiology ordered TTE, pending at the time of this note. Possible mild case of acute chest syndrome, he improved quickly. Sickle cell disease, anemia. Analgesia, IVFs. 1u pRBCs on 8/28. Infiltrate, cough, recent low-grade fever. COVID negative. Procalcitonin repeatedly negative. Negative rapid flu. perhaps this was due to his acute chest syndrome. He recovered quickly and no further abx will ne necessary after discharge. Physical Exam Performed:     /63   Pulse 72   Temp 98.6 °F (37 °C) (Oral)   Resp 16   Ht 5' 9\" (1.753 m)   Wt 193 lb 11.2 oz (87.9 kg)   SpO2 95%   BMI 28.60 kg/m²       General appearance:  No apparent distress, appears stated age and cooperative. HEENT:  Normal cephalic, atraumatic without obvious deformity. Pupils equal, round, and reactive to light. Extra ocular muscles intact. Conjunctivae/corneas clear. Neck: Supple, with full range of motion. No jugular venous distention. Trachea midline. Respiratory:  Normal respiratory effort. Clear to auscultation, bilaterally without Rales/Wheezes/Rhonchi. Cardiovascular:  Regular rate and rhythm with normal S1/S2 without murmurs, rubs or gallops. Abdomen: Soft, non-tender, non-distended with normal bowel sounds. Musculoskeletal:  No clubbing, cyanosis or edema bilaterally. Full range of motion without deformity. Skin: Skin color, texture, turgor normal.  No rashes or lesions. Neurologic:  Neurovascularly intact without any focal sensory/motor deficits. Cranial nerves: II-XII intact, grossly non-focal.  Psychiatric:  Alert and oriented, thought content appropriate, normal insight. Flat affect, psychomotor slowing. Capillary Refill: Brisk,< 3 seconds   Peripheral Pulses: +2 palpable, equal bilaterally       Labs: For convenience and continuity at follow-up the following most recent labs are provided:      CBC:    Lab Results   Component Value Date/Time    WBC 14.4 08/29/2022 02:12 AM    HGB 8.2 08/29/2022 02:12 AM    HCT 24.1 08/29/2022 02:12 AM     08/29/2022 02:12 AM       Renal:    Lab Results   Component Value Date/Time     08/29/2022 02:12 AM    K 4.2 08/29/2022 02:12 AM     08/29/2022 02:12 AM    CO2 22 08/29/2022 02:12 AM    BUN 7 08/29/2022 02:12 AM    CREATININE 0.7 08/29/2022 02:12 AM    CALCIUM 10.3 08/29/2022 02:12 AM    PHOS 4.5 10/20/2021 06:39 AM         Significant Diagnostic Studies    Radiology:   CT CHEST PULMONARY EMBOLISM W CONTRAST   Final Result   1. No pulmonary embolism. 2. Patchy peripheral ground-glass opacities in the lower lobes which could   reflect atelectasis, pneumonitis or viral pneumonia. 3. Stable cardiomegaly. RECOMMENDATIONS:   Unavailable         XR CHEST (2 VW)   Final Result   No acute cardiopulmonary disease.                 Consults:     IP CONSULT TO HOSPITALIST  IP

## 2022-10-22 ENCOUNTER — HOSPITAL ENCOUNTER (INPATIENT)
Age: 22
LOS: 2 days | Discharge: HOME | DRG: 812 | End: 2022-10-24
Attending: EMERGENCY MEDICINE | Admitting: INTERNAL MEDICINE
Payer: COMMERCIAL

## 2022-10-22 ENCOUNTER — APPOINTMENT (OUTPATIENT)
Dept: GENERAL RADIOLOGY | Age: 22
DRG: 812 | End: 2022-10-22
Payer: COMMERCIAL

## 2022-10-22 DIAGNOSIS — N48.32 PRIAPISM DUE TO SICKLE CELL DISEASE (HCC): Primary | ICD-10-CM

## 2022-10-22 DIAGNOSIS — D57.1 PRIAPISM DUE TO SICKLE CELL DISEASE (HCC): Primary | ICD-10-CM

## 2022-10-22 DIAGNOSIS — D64.9 ANEMIA, UNSPECIFIED TYPE: ICD-10-CM

## 2022-10-22 DIAGNOSIS — R53.83 FATIGUE, UNSPECIFIED TYPE: ICD-10-CM

## 2022-10-22 PROBLEM — N48.30 PRIAPISM: Status: ACTIVE | Noted: 2022-10-22

## 2022-10-22 LAB
A/G RATIO: 2.1 (ref 1.1–2.2)
ABO/RH: NORMAL
ALBUMIN SERPL-MCNC: 4.1 G/DL (ref 3.4–5)
ALP BLD-CCNC: 71 U/L (ref 40–129)
ALT SERPL-CCNC: 23 U/L (ref 10–40)
ANION GAP SERPL CALCULATED.3IONS-SCNC: 6 MMOL/L (ref 3–16)
ANTIBODY SCREEN: NORMAL
AST SERPL-CCNC: 36 U/L (ref 15–37)
BACTERIA: ABNORMAL /HPF
BASOPHILS ABSOLUTE: 0.2 K/UL (ref 0–0.2)
BASOPHILS RELATIVE PERCENT: 1.1 %
BILIRUB SERPL-MCNC: 5.6 MG/DL (ref 0–1)
BILIRUBIN URINE: NEGATIVE
BLOOD, URINE: ABNORMAL
BUN BLDV-MCNC: 11 MG/DL (ref 7–20)
CALCIUM SERPL-MCNC: 8.8 MG/DL (ref 8.3–10.6)
CHLORIDE BLD-SCNC: 106 MMOL/L (ref 99–110)
CLARITY: CLEAR
CO2: 27 MMOL/L (ref 21–32)
COLOR: ABNORMAL
CREAT SERPL-MCNC: 0.7 MG/DL (ref 0.9–1.3)
EKG ATRIAL RATE: 59 BPM
EKG DIAGNOSIS: NORMAL
EKG P AXIS: 27 DEGREES
EKG P-R INTERVAL: 174 MS
EKG Q-T INTERVAL: 418 MS
EKG QRS DURATION: 102 MS
EKG QTC CALCULATION (BAZETT): 413 MS
EKG R AXIS: 80 DEGREES
EKG T AXIS: 42 DEGREES
EKG VENTRICULAR RATE: 59 BPM
EOSINOPHILS ABSOLUTE: 0.2 K/UL (ref 0–0.6)
EOSINOPHILS RELATIVE PERCENT: 1.4 %
EPITHELIAL CELLS, UA: ABNORMAL /HPF (ref 0–5)
GFR SERPL CREATININE-BSD FRML MDRD: >60 ML/MIN/{1.73_M2}
GLUCOSE BLD-MCNC: 110 MG/DL (ref 70–99)
GLUCOSE URINE: NEGATIVE MG/DL
HCT VFR BLD CALC: 19.3 % (ref 40.5–52.5)
HEMOGLOBIN: 6.7 G/DL (ref 13.5–17.5)
IMMATURE RETIC FRACT: 0.59 (ref 0.21–0.37)
KETONES, URINE: NEGATIVE MG/DL
LEUKOCYTE ESTERASE, URINE: NEGATIVE
LYMPHOCYTES ABSOLUTE: 2.4 K/UL (ref 1–5.1)
LYMPHOCYTES RELATIVE PERCENT: 14.8 %
MCH RBC QN AUTO: 33.7 PG (ref 26–34)
MCHC RBC AUTO-ENTMCNC: 34.9 G/DL (ref 31–36)
MCV RBC AUTO: 96.6 FL (ref 80–100)
MICROSCOPIC EXAMINATION: YES
MONOCYTES ABSOLUTE: 1.6 K/UL (ref 0–1.3)
MONOCYTES RELATIVE PERCENT: 9.8 %
NEUTROPHILS ABSOLUTE: 12.1 K/UL (ref 1.7–7.7)
NEUTROPHILS RELATIVE PERCENT: 72.9 %
NITRITE, URINE: NEGATIVE
PDW BLD-RTO: 23.4 % (ref 12.4–15.4)
PH UA: 6.5 (ref 5–8)
PLATELET # BLD: 364 K/UL (ref 135–450)
PLATELET SLIDE REVIEW: ADEQUATE
PMV BLD AUTO: 8 FL (ref 5–10.5)
POLYCHROMASIA: ABNORMAL
POTASSIUM REFLEX MAGNESIUM: 4.1 MMOL/L (ref 3.5–5.1)
PROTEIN UA: 30 MG/DL
RBC # BLD: 2 M/UL (ref 4.2–5.9)
RBC UA: ABNORMAL /HPF (ref 0–4)
RETICULOCYTE ABSOLUTE COUNT: 0.2 M/UL
RETICULOCYTE COUNT PCT: 10.13 % (ref 0.5–2.18)
SICKLE CELLS: ABNORMAL
SLIDE REVIEW: ABNORMAL
SODIUM BLD-SCNC: 139 MMOL/L (ref 136–145)
SPECIFIC GRAVITY UA: 1.01 (ref 1–1.03)
TARGET CELLS: ABNORMAL
TOTAL PROTEIN: 6.1 G/DL (ref 6.4–8.2)
TROPONIN: <0.01 NG/ML
URINE REFLEX TO CULTURE: ABNORMAL
URINE TYPE: ABNORMAL
UROBILINOGEN, URINE: 1 E.U./DL
WBC # BLD: 16.5 K/UL (ref 4–11)
WBC UA: ABNORMAL /HPF (ref 0–5)

## 2022-10-22 PROCEDURE — 86900 BLOOD TYPING SEROLOGIC ABO: CPT

## 2022-10-22 PROCEDURE — 1200000000 HC SEMI PRIVATE

## 2022-10-22 PROCEDURE — 54220 IRRG CRPRA CAVRNOSA PRIAPISM: CPT

## 2022-10-22 PROCEDURE — 85045 AUTOMATED RETICULOCYTE COUNT: CPT

## 2022-10-22 PROCEDURE — 96375 TX/PRO/DX INJ NEW DRUG ADDON: CPT

## 2022-10-22 PROCEDURE — 96374 THER/PROPH/DIAG INJ IV PUSH: CPT

## 2022-10-22 PROCEDURE — 71046 X-RAY EXAM CHEST 2 VIEWS: CPT

## 2022-10-22 PROCEDURE — 84484 ASSAY OF TROPONIN QUANT: CPT

## 2022-10-22 PROCEDURE — 93005 ELECTROCARDIOGRAM TRACING: CPT | Performed by: REGISTERED NURSE

## 2022-10-22 PROCEDURE — 6360000002 HC RX W HCPCS: Performed by: INTERNAL MEDICINE

## 2022-10-22 PROCEDURE — 85025 COMPLETE CBC W/AUTO DIFF WBC: CPT

## 2022-10-22 PROCEDURE — 96376 TX/PRO/DX INJ SAME DRUG ADON: CPT

## 2022-10-22 PROCEDURE — 86850 RBC ANTIBODY SCREEN: CPT

## 2022-10-22 PROCEDURE — 6360000002 HC RX W HCPCS

## 2022-10-22 PROCEDURE — 6370000000 HC RX 637 (ALT 250 FOR IP): Performed by: INTERNAL MEDICINE

## 2022-10-22 PROCEDURE — 87040 BLOOD CULTURE FOR BACTERIA: CPT

## 2022-10-22 PROCEDURE — 86902 BLOOD TYPE ANTIGEN DONOR EA: CPT

## 2022-10-22 PROCEDURE — 2580000003 HC RX 258: Performed by: EMERGENCY MEDICINE

## 2022-10-22 PROCEDURE — 81001 URINALYSIS AUTO W/SCOPE: CPT

## 2022-10-22 PROCEDURE — 85660 RBC SICKLE CELL TEST: CPT

## 2022-10-22 PROCEDURE — 80053 COMPREHEN METABOLIC PANEL: CPT

## 2022-10-22 PROCEDURE — 86901 BLOOD TYPING SEROLOGIC RH(D): CPT

## 2022-10-22 PROCEDURE — 93010 ELECTROCARDIOGRAM REPORT: CPT | Performed by: INTERNAL MEDICINE

## 2022-10-22 PROCEDURE — 2580000003 HC RX 258: Performed by: INTERNAL MEDICINE

## 2022-10-22 PROCEDURE — 99285 EMERGENCY DEPT VISIT HI MDM: CPT

## 2022-10-22 PROCEDURE — P9016 RBC LEUKOCYTES REDUCED: HCPCS

## 2022-10-22 PROCEDURE — 36415 COLL VENOUS BLD VENIPUNCTURE: CPT

## 2022-10-22 PROCEDURE — 86923 COMPATIBILITY TEST ELECTRIC: CPT

## 2022-10-22 PROCEDURE — 6360000002 HC RX W HCPCS: Performed by: EMERGENCY MEDICINE

## 2022-10-22 RX ORDER — ENOXAPARIN SODIUM 100 MG/ML
40 INJECTION SUBCUTANEOUS DAILY
Status: DISCONTINUED | OUTPATIENT
Start: 2022-10-22 | End: 2022-10-24 | Stop reason: HOSPADM

## 2022-10-22 RX ORDER — SODIUM CHLORIDE 9 MG/ML
INJECTION, SOLUTION INTRAVENOUS PRN
Status: DISCONTINUED | OUTPATIENT
Start: 2022-10-22 | End: 2022-10-24 | Stop reason: HOSPADM

## 2022-10-22 RX ORDER — ACETAMINOPHEN 650 MG/1
650 SUPPOSITORY RECTAL EVERY 6 HOURS PRN
Status: DISCONTINUED | OUTPATIENT
Start: 2022-10-22 | End: 2022-10-24 | Stop reason: HOSPADM

## 2022-10-22 RX ORDER — HYDROXYUREA 500 MG/1
1000 CAPSULE ORAL DAILY
Status: DISCONTINUED | OUTPATIENT
Start: 2022-10-22 | End: 2022-10-24 | Stop reason: HOSPADM

## 2022-10-22 RX ORDER — POLYETHYLENE GLYCOL 3350 17 G/17G
17 POWDER, FOR SOLUTION ORAL DAILY PRN
Status: DISCONTINUED | OUTPATIENT
Start: 2022-10-22 | End: 2022-10-24 | Stop reason: HOSPADM

## 2022-10-22 RX ORDER — FOLIC ACID 1 MG/1
2 TABLET ORAL DAILY
Status: DISCONTINUED | OUTPATIENT
Start: 2022-10-22 | End: 2022-10-24 | Stop reason: HOSPADM

## 2022-10-22 RX ORDER — SODIUM CHLORIDE 0.9 % (FLUSH) 0.9 %
5-40 SYRINGE (ML) INJECTION EVERY 12 HOURS SCHEDULED
Status: DISCONTINUED | OUTPATIENT
Start: 2022-10-22 | End: 2022-10-24 | Stop reason: HOSPADM

## 2022-10-22 RX ORDER — PANTOPRAZOLE SODIUM 40 MG/1
40 TABLET, DELAYED RELEASE ORAL
Status: DISCONTINUED | OUTPATIENT
Start: 2022-10-23 | End: 2022-10-24 | Stop reason: HOSPADM

## 2022-10-22 RX ORDER — ONDANSETRON 2 MG/ML
4 INJECTION INTRAMUSCULAR; INTRAVENOUS ONCE
Status: COMPLETED | OUTPATIENT
Start: 2022-10-22 | End: 2022-10-22

## 2022-10-22 RX ORDER — SODIUM CHLORIDE 9 MG/ML
INJECTION, SOLUTION INTRAVENOUS CONTINUOUS
Status: DISCONTINUED | OUTPATIENT
Start: 2022-10-22 | End: 2022-10-24 | Stop reason: HOSPADM

## 2022-10-22 RX ORDER — ACETAMINOPHEN 325 MG/1
650 TABLET ORAL EVERY 6 HOURS PRN
Status: DISCONTINUED | OUTPATIENT
Start: 2022-10-22 | End: 2022-10-24 | Stop reason: HOSPADM

## 2022-10-22 RX ORDER — ONDANSETRON 2 MG/ML
4 INJECTION INTRAMUSCULAR; INTRAVENOUS EVERY 6 HOURS PRN
Status: DISCONTINUED | OUTPATIENT
Start: 2022-10-22 | End: 2022-10-24 | Stop reason: HOSPADM

## 2022-10-22 RX ORDER — ALBUTEROL SULFATE 2.5 MG/3ML
2.5 SOLUTION RESPIRATORY (INHALATION) EVERY 4 HOURS PRN
Status: DISCONTINUED | OUTPATIENT
Start: 2022-10-22 | End: 2022-10-24 | Stop reason: HOSPADM

## 2022-10-22 RX ORDER — ONDANSETRON 4 MG/1
4 TABLET, ORALLY DISINTEGRATING ORAL EVERY 8 HOURS PRN
Status: DISCONTINUED | OUTPATIENT
Start: 2022-10-22 | End: 2022-10-24 | Stop reason: HOSPADM

## 2022-10-22 RX ORDER — PHENYLEPHRINE HCL IN 0.9% NACL 1 MG/10 ML
0.1 SYRINGE (ML) INTRAVENOUS EVERY 5 MIN PRN
Status: DISCONTINUED | OUTPATIENT
Start: 2022-10-22 | End: 2022-10-24 | Stop reason: HOSPADM

## 2022-10-22 RX ORDER — SODIUM CHLORIDE 0.9 % (FLUSH) 0.9 %
5-40 SYRINGE (ML) INJECTION PRN
Status: DISCONTINUED | OUTPATIENT
Start: 2022-10-22 | End: 2022-10-24 | Stop reason: HOSPADM

## 2022-10-22 RX ORDER — OXYCODONE HYDROCHLORIDE AND ACETAMINOPHEN 5; 325 MG/1; MG/1
1 TABLET ORAL EVERY 4 HOURS PRN
Status: DISCONTINUED | OUTPATIENT
Start: 2022-10-22 | End: 2022-10-23

## 2022-10-22 RX ORDER — 0.9 % SODIUM CHLORIDE 0.9 %
1000 INTRAVENOUS SOLUTION INTRAVENOUS ONCE
Status: COMPLETED | OUTPATIENT
Start: 2022-10-22 | End: 2022-10-22

## 2022-10-22 RX ADMIN — SODIUM CHLORIDE: 9 INJECTION, SOLUTION INTRAVENOUS at 15:56

## 2022-10-22 RX ADMIN — Medication 1 MG: at 11:27

## 2022-10-22 RX ADMIN — OXYCODONE AND ACETAMINOPHEN 1 TABLET: 5; 325 TABLET ORAL at 21:55

## 2022-10-22 RX ADMIN — HYDROMORPHONE HYDROCHLORIDE 1 MG: 1 INJECTION, SOLUTION INTRAMUSCULAR; INTRAVENOUS; SUBCUTANEOUS at 20:11

## 2022-10-22 RX ADMIN — Medication 10 ML: at 20:12

## 2022-10-22 RX ADMIN — SODIUM CHLORIDE 1000 ML: 9 INJECTION, SOLUTION INTRAVENOUS at 09:03

## 2022-10-22 RX ADMIN — FOLIC ACID 2 MG: 1 TABLET ORAL at 16:02

## 2022-10-22 RX ADMIN — HYDROMORPHONE HYDROCHLORIDE 1 MG: 1 INJECTION, SOLUTION INTRAMUSCULAR; INTRAVENOUS; SUBCUTANEOUS at 15:56

## 2022-10-22 RX ADMIN — SODIUM CHLORIDE: 9 INJECTION, SOLUTION INTRAVENOUS at 23:37

## 2022-10-22 RX ADMIN — HYDROMORPHONE HYDROCHLORIDE 1 MG: 1 INJECTION, SOLUTION INTRAMUSCULAR; INTRAVENOUS; SUBCUTANEOUS at 09:04

## 2022-10-22 RX ADMIN — HYDROMORPHONE HYDROCHLORIDE 1 MG: 1 INJECTION, SOLUTION INTRAMUSCULAR; INTRAVENOUS; SUBCUTANEOUS at 11:27

## 2022-10-22 RX ADMIN — ONDANSETRON 4 MG: 2 INJECTION INTRAMUSCULAR; INTRAVENOUS at 09:04

## 2022-10-22 RX ADMIN — ENOXAPARIN SODIUM 40 MG: 100 INJECTION SUBCUTANEOUS at 16:02

## 2022-10-22 ASSESSMENT — PAIN SCALES - GENERAL
PAINLEVEL_OUTOF10: 7
PAINLEVEL_OUTOF10: 9
PAINLEVEL_OUTOF10: 9
PAINLEVEL_OUTOF10: 10
PAINLEVEL_OUTOF10: 8
PAINLEVEL_OUTOF10: 9
PAINLEVEL_OUTOF10: 7
PAINLEVEL_OUTOF10: 9
PAINLEVEL_OUTOF10: 10
PAINLEVEL_OUTOF10: 8

## 2022-10-22 ASSESSMENT — ENCOUNTER SYMPTOMS
ABDOMINAL PAIN: 0
BACK PAIN: 0
DIARRHEA: 0
COUGH: 0
SORE THROAT: 0
CHEST TIGHTNESS: 0
CONSTIPATION: 0
RHINORRHEA: 0
SHORTNESS OF BREATH: 0
VOMITING: 0
NAUSEA: 0

## 2022-10-22 ASSESSMENT — PAIN DESCRIPTION - DESCRIPTORS
DESCRIPTORS: ACHING;SHARP
DESCRIPTORS: ACHING;SHARP
DESCRIPTORS: ACHING;STABBING

## 2022-10-22 ASSESSMENT — PAIN DESCRIPTION - LOCATION
LOCATION: HIP
LOCATION: PENIS
LOCATION: HIP

## 2022-10-22 ASSESSMENT — PAIN - FUNCTIONAL ASSESSMENT: PAIN_FUNCTIONAL_ASSESSMENT: 0-10

## 2022-10-22 NOTE — ED NOTES
Pt alert and oriented times 4, signed blood transfusion consent,      Kristian Horta RN  10/22/22 3508

## 2022-10-22 NOTE — PROGRESS NOTES
Patient admitted from the ED and transported to room via cart and self transferred to bed. Patient C/O pain and educated on pain medications ordered and frequencies. IVF initiated as ordered, Settled and oriented to room, safety maintained, call light in reach and denies any needs at this time.

## 2022-10-22 NOTE — ED PROVIDER NOTES
[address] [address] TELE/MED SURG/ONC  EMERGENCY DEPARTMENT ENCOUNTER        Pt Name: Hans Green  MRN: [id number]  [place] 2000  Date of evaluation: 10/22/2022  Provider: CARMELITA Gaffney - LYNNETTE  PCP: Krystin Lino MD    This patient was seen and evaluated by the attending physician Moses Irvin MD.    I have evaluated this patient. My supervising physician was available for consultation. CHIEF COMPLAINT       Chief Complaint   Patient presents with    Penis Pain     Patient states he started with erection at 5am, unable to get it to go down. HISTORY OF PRESENT ILLNESS   (Location/Symptom, Timing/Onset, Context/Setting, Quality, Duration, Modifying Factors, Severity)  Note limiting factors. Hans Green is a 20 y.o. male who presents via private car for priapism. Onset was this morning approximately 5 AM. Duration has been since the onset. Context includes patient presents today for evaluation of priapism since approximately 5 AM.  Patient does have history of sickle cell and recurrent priapism most recently seen in August for this. He denies any current chest pain, shortness of breath, recent illness including fevers, cough or congestion. He denies any swelling in his lower extremities or palpitations. He denies any abdominal pain, nausea, vomiting, diarrhea or constipation. He denies any urinary symptoms including dysuria, urgency, frequency, hematuria or flank pain, he states his last urine void was approximately 6 AM without difficulty. He denies any pain to his testicles. Quality is dull and aching with radiation to his penis. Alleviating factors include nothing. Aggravating factors include nothing. Pain is 10/10. Nothing has been used for pain today. Chart review reveals pt has significant PMHx of DVT, accidental overdose, asthma, enlarged heart, priapism due to sickle cell, sickle cell anemia.  They take morphine, oxycodone, patient is not currently taking blood thinners. Nursing Notes were all reviewed and agreed with or any disagreements were addressed  in the HPI. Pt was seen during the [place] 19 pandemic. Appropriate PPE worn by ME during patient encounters. Pt seen during a time with constrained hospital bed capacity and other potential inpatient and outpatient resources were constrained due to the viral pandemic. REVIEW OF SYSTEMS    (2-9 systems for level 4, 10 or more for level 5)     Review of Systems   Constitutional:  Negative for chills, diaphoresis and fever. HENT:  Negative for congestion, rhinorrhea and sore throat. Respiratory:  Negative for cough, chest tightness and shortness of breath. Cardiovascular:  Negative for chest pain, palpitations and leg swelling. Gastrointestinal:  Negative for abdominal pain, constipation, diarrhea, nausea and vomiting. Genitourinary:  Positive for penile pain and penile swelling. Negative for decreased urine volume, dysuria, flank pain, frequency, hematuria, scrotal swelling, testicular pain and urgency. Musculoskeletal:  Negative for back pain and neck pain. Skin:  Negative for rash and wound. Neurological:  Negative for dizziness, light-headedness and headaches. Positives and Pertinent negatives as per HPI. Except as noted abovein the ROS, all other systems were reviewed and negative.        PAST MEDICAL HISTORY     Past Medical History:   Diagnosis Date    Accidental overdose     Asthma     DVT (deep venous thrombosis) ([facility] [address] 10/19/2021    R leg    Enlarged heart     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)     Sickle cell anemia (HCC)          SURGICAL HISTORY     Past Surgical History:   Procedure Laterality Date    SPLENECTOMY           CURRENTMEDICATIONS       Current Discharge Medication List        CONTINUE these medications which have NOT CHANGED    Details   morphine (MS CONTIN) 15 MG extended release tablet       DULoxetine (CYMBALTA) 30 MG extended release capsule apixaban (ELIQUIS) 5 MG TABS tablet Take 1 tablet by mouth 2 times daily  Qty: 60 tablet, Refills: 0      pantoprazole (PROTONIX) 40 MG tablet Take 1 tablet by mouth every morning (before breakfast)  Qty: 30 tablet, Refills: 0      folic acid (FOLVITE) 1 MG tablet Take 2 tablets by mouth daily  Qty: 30 tablet, Refills: 3      oxyCODONE HCl (OXY-IR) 10 MG immediate release tablet Take 10 mg by mouth every 4 hours as needed for Pain.       albuterol sulfate HFA (PROVENTIL;VENTOLIN;PROAIR) 108 (90 Base) MCG/ACT inhaler Albuterol HFA Inhaler 90 mcg/actuation  inhaled  2 puffs prn     Active      levalbuterol (XOPENEX) 0.31 MG/3ML nebulization Levalbuterol Nebulized    2 puffs prn     Active      docusate (COLACE, DULCOLAX) 100 MG CAPS Take 100 mg by mouth      ibuprofen (ADVIL;MOTRIN) 800 MG tablet ibuprofen 800 MG Oral Tablet  oral   prn    Active      hydroxyurea (HYDREA) 500 MG chemo capsule Take 1,000 mg by mouth daily   Qty:                 ALLERGIES     Morphine    FAMILYHISTORY       Family History   Problem Relation Age of Onset    Other Father         sarcoidosis          SOCIAL HISTORY       Social History     Socioeconomic History    Marital status: Single     Spouse name: None    Number of children: 0    Years of education: None    Highest education level: None   Tobacco Use    Smoking status: Never    Smokeless tobacco: Never   Vaping Use    Vaping Use: Never used   Substance and Sexual Activity    Alcohol use: Not Currently    Drug use: Never    Sexual activity: Not Currently       SCREENINGS    Blade Coma Scale  Eye Opening: Spontaneous  Best Verbal Response: Oriented  Best Motor Response: Obeys commands  [place] Coma Scale Score: 15        PHYSICAL EXAM    (up to 7 for level 4, 8 or more for level 5)     ED Triage Vitals   BP Temp Temp src Heart Rate Resp SpO2 Height Weight   10/22/22 0833 10/22/22 0902 -- 10/22/22 0833 10/22/22 0833 10/22/22 0833 -- 10/22/22 0833   (!) 144/92 99 °F (37.2 °C)  (!) 104 25 92 %  193 lb (87.5 kg)       Physical Exam  Vitals and nursing note reviewed. Exam conducted with a chaperone present. Constitutional:       Appearance: Normal appearance. He is not ill-appearing or diaphoretic. HENT:      Head: Normocephalic and atraumatic. Right Ear: External ear normal.      Left Ear: External ear normal.      Mouth/Throat:      Mouth: Mucous membranes are moist.      Pharynx: Oropharynx is clear. Eyes:      General:         Right eye: No discharge. Left eye: No discharge. Cardiovascular:      Rate and Rhythm: Normal rate and regular rhythm. Pulses: Normal pulses. Heart sounds: Normal heart sounds. No murmur heard. No friction rub. No gallop. Pulmonary:      Effort: Pulmonary effort is normal. No respiratory distress. Breath sounds: Normal breath sounds. No stridor. No wheezing, rhonchi or rales. Abdominal:      General: Abdomen is flat. Bowel sounds are normal.      Palpations: Abdomen is soft. Genitourinary:     Pubic Area: No rash or pubic lice. Penis: Circumcised. Tenderness and swelling present. Testes: Normal. Cremasteric reflex is present. Comments: Priapism present on genital exam, denies loss of sensation to penis  Musculoskeletal:         General: Normal range of motion. Cervical back: Normal range of motion and neck supple. Skin:     General: Skin is warm and dry. Neurological:      General: No focal deficit present. Mental Status: He is alert and oriented to person, place, and time.    Psychiatric:         Mood and Affect: Mood normal.         Behavior: Behavior normal.     PHYSICAL EXAM  BP (!) 101/52   Pulse 62   Temp 98.1 °F (36.7 °C) (Oral)   Resp 18   Wt 193 lb (87.5 kg)   SpO2 92%   BMI 28.50 kg/m²       DIAGNOSTIC RESULTS   LABS:    Labs Reviewed   CBC WITH AUTO DIFFERENTIAL - Abnormal; Notable for the following components:       Result Value    WBC 16.5 (*)     RBC 2.00 (*)     Hemoglobin 6.7 (*)     Hematocrit 19.3 (*)     RDW 23.4 (*)     Neutrophils Absolute 12.1 (*)     Monocytes Absolute 1.6 (*)     Polychromasia Occasional (*)     Target Cells Occasional (*)     Sickle Cells 2+ (*)     All other components within normal limits    Narrative:     Christi Ward tel. [phone],  Hematology results called to and read back by James Cole RN, 10/22/2022  10:17, by Venkat Herrera   COMPREHENSIVE METABOLIC PANEL W/ REFLEX TO MG FOR LOW K - Abnormal; Notable for the following components:    Glucose 110 (*)     Creatinine 0.7 (*)     Total Protein 6.1 (*)     Total Bilirubin 5.6 (*)     All other components within normal limits   URINALYSIS WITH REFLEX TO CULTURE - Abnormal; Notable for the following components:    Color, UA SADE (*)     Blood, Urine MODERATE (*)     Protein, UA 30 (*)     All other components within normal limits   RETICULOCYTES - Abnormal; Notable for the following components:    Retic Ct Pct 10.13 (*)     Immature Retic Fract 0.59 (*)     All other components within normal limits    Narrative:     Christi Ward tel. [phone],  Hematology results called to and read back by James Cole RN, 10/22/2022  10:17, by [employer] - Abnormal; Notable for the following components:    Bacteria, UA 3+ (*)     All other components within normal limits   CULTURE, BLOOD 1   CULTURE, BLOOD 2   TROPONIN   TYPE AND SCREEN   PREPARE RBC (CROSSMATCH)       All other labs were within normal range or not returned as of this dictation. EKG: All EKG's are interpreted by the Emergency Department Physician who either signs orCo-signs this chart in the absence of a cardiologist.  Please see their note for interpretation of EKG.       RADIOLOGY:   Non-plain film images such as CT, Ultrasound and MRI are read by the radiologist. Plain radiographic images are visualized andpreliminarily interpreted by the  ED Provider with the below findings:        Interpretation perthe Radiologist below, if available at the time of this note:    XR CHEST (2 VW)   Final Result   No acute cardiopulmonary disease. No results found. PROCEDURES   Unless otherwise noted below, none     Treatment Conditions    Date/Time: 10/22/2022 2:54 PM  Performed by: CARMELITA Randhawa - CNP  Authorized by: Kimi Frey MD   Consent: Verbal consent obtained. Written consent obtained. Risks and benefits: risks, benefits and alternatives were discussed  Consent given by: patient  Patient understanding: patient states understanding of the procedure being performed  Patient consent: the patient's understanding of the procedure matches consent given  Procedure consent: procedure consent matches procedure scheduled  Patient identity confirmed: verbally with patient  Time out: Immediately prior to procedure a \"time out\" was called to verify the correct patient, procedure, equipment, support staff and site/side marked as required. Preparation: Patient was prepped and draped in the usual sterile fashion. Local anesthesia used: yes  Anesthesia: local infiltration    Anesthesia:  Local anesthesia used: yes  Local Anesthetic: lidocaine 1% without epinephrine  Anesthetic total: 3 mL    Sedation:  Patient sedated: no (premedication with dilaudid for pain)    Patient tolerance: patient tolerated the procedure well with no immediate complications  Comments: Priapism detomescence preformed with Xiao Anderson MD. 1.5mL lidocaine 1% injected into proximal shaft of penis at 10 oclock and 2 oclock. Aspiration of 30mL of dark red blood from 2 oclock and 25mL dark red blood from 10 oclock. 100mcg of phenylephrine injected into proximal shaft of penis at 10 oclock and 2 oclock. Complete detomescence obtained. Patient reported relief of pain and pressure post procedure.         CRITICAL CARE TIME   N/A    CONSULTS:  IP CONSULT TO UROLOGY  IP CONSULT TO HEM/ONC      EMERGENCY DEPARTMENT COURSE and DIFFERENTIALDIAGNOSIS/MDM:   Vitals: Vitals:    10/22/22 1318 10/22/22 1340 10/22/22 1429 10/22/22 1456   BP: (!) 115/56 118/62 117/68 (!) 101/52   Pulse:    62   Resp:    18   Temp:   97.9 °F (36.6 °C) 98.1 °F (36.7 °C)   TempSrc:   Oral Oral   SpO2: 95% 100%  92%   Weight:           Patient was given thefollowing medications:  Medications   phenylephrine 1 MG/10ML injection 0.1 mg (has no administration in time range)   pantoprazole (PROTONIX) tablet 40 mg (has no administration in time range)   hydroxyurea (HYDREA) chemo capsule 1,000 mg (has no administration in time range)   folic acid (FOLVITE) tablet 2 mg (has no administration in time range)   sodium chloride flush 0.9 % injection 5-40 mL (has no administration in time range)   sodium chloride flush 0.9 % injection 5-40 mL (has no administration in time range)   0.9 % sodium chloride infusion (has no administration in time range)   enoxaparin (LOVENOX) injection 40 mg (has no administration in time range)   ondansetron (ZOFRAN-ODT) disintegrating tablet 4 mg (has no administration in time range)     Or   ondansetron (ZOFRAN) injection 4 mg (has no administration in time range)   polyethylene glycol (GLYCOLAX) packet 17 g (has no administration in time range)   acetaminophen (TYLENOL) tablet 650 mg (has no administration in time range)     Or   acetaminophen (TYLENOL) suppository 650 mg (has no administration in time range)   albuterol (PROVENTIL) nebulizer solution 2.5 mg (has no administration in time range)   0.9 % sodium chloride infusion (has no administration in time range)   0.9 % sodium chloride infusion (has no administration in time range)   oxyCODONE-acetaminophen (PERCOCET) 5-325 MG per tablet 1 tablet (has no administration in time range)   HYDROmorphone (DILAUDID) injection 1 mg (has no administration in time range)   0.9 % sodium chloride bolus (0 mLs IntraVENous Stopped 10/22/22 1227)   HYDROmorphone (DILAUDID) injection 1 mg (1 mg IntraVENous Given 10/22/22 0904)   ondansetron [facility]) injection 4 mg (4 mg IntraVENous Given 10/22/22 0904)   HYDROmorphone (DILAUDID) injection 1 mg (1 mg IntraVENous Given 10/22/22 1127)       PDMP Monitoring:    Last PDMP [place] as Reviewed [facility]):  Review User Review Instant Review Result   ABDULAZIZ WOLF 1/9/2022 11:40 PM Reviewed PDMP [1]     Last Controlled Substance Monitoring Documentation      Flowsheet Row ED to Hosp-Admission (Discharged) from 8/25/2021 in [address]   Comments Left with nurse from c3 with all belongings in hand filed at 08/25/2021 1155          Urine Drug Screenings (1 yr)       Opiates, Quantitative  Collected: 7/24/2021 12:00 PM (Final result)   Narrative: Referred out by:  [address] [address] [address] [facility]   Phone [phone]             Drugs of abuse 9 serum w/ reflex  Collected: 7/24/2021 12:00 PM (Final result)   Narrative: Referred out by:  [place] [address] [facility]   Phone [phone]             Urine Drug Screen  Collected: 12/28/2021  1:40 PM (Final result)   Narrative: Performed at:  [place] [address] [facility]   Phone [phone]             Drug screen multi urine  Collected: 7/25/2021  3:05 AM (Final result)   Narrative: Performed at:  [place] [address] [facility]   Phone [phone]             Drug screen multi urine  Collected: 9/26/2020  7:47 PM (Final result)   Narrative: Performed at:  [place] [address] [facility]   Phone [phone]                 Medication Contract and Consent for Opioid Use Documents Filed        No documents found                    MDM:   This patient was seen and evaluated by myself and my attending physician.   He presents to the emergency department today with complaints of priapism since approximately 5 AM.  On exam he is alert and oriented, hemodynamically stable and nontoxic in appearance. He does have a history of sickle cell disease and does not take his blood thinner, he has not followed up with urology as an outpatient for recurrent priapism. He will have a work-up in the emergency department consisting of laboratory studies and be medicated for pain and given IV fluids. CBC reveals leukocytosis of 16.5, RBC 2, hemoglobin 6.7, hematocrit 19.3. Although patient does exhibit chronic anemia this does appear to be lower than usual for his laboratory studies. CMP reveals glucose 110, creatinine 0.7, protein 6.1, total bili 5.6. Troponin is negative. Reticulocyte elevated at 10.13 however this does appear to be lower than previous studies patient has had collected over the last 2 months. EKG interpreted by the attending physician. Chest x-ray interpreted by radiologist for no acute cardiopulmonary disease. I did speak with Dr. Cristian Hemphill with urology who suggested aspiration and injection of phenylephrine in the er. Lidocaine 1% without epinephrine injected at 10:00 and 2:00 at the proximal shaft of the penis to provide pain control for injection. 18-gauge needle introduced at 10:00 and 2:00 at the distal shaft of the penis. Approximately 30 mL of blood aspirated from 2:00, approximately 25 mL of blood aspirated from 10:00.  1 mL of phenylephrine injected at 10:00 and 2:00 at the proximal shaft of the penis. See procedure note for details of priapism detumescence. Patient tolerated procedure without difficulty. Complete detumescence obtained. Patient required 2 doses of Dilaudid 1 mg in the emergency department to achieve adequate pain control. He did receive 1 L normal saline via IV bolus. Vital signs remained stable.   I discussed with the patient a plan for admission to the hospital for blood transfusion as well as urgent urologic evaluation for possible shunt placement as discussed with Dr. Marce Nicolas of urology. Patient was agreeable to this plan. Discussion had with Dr. Oh Reyna and felt blood transfusion would be best received on an inpatient status, consult placed to hospital medicine for disposition of admission. Hospital medicine team agreeable to admitting the patient. Care of patient transferred to hospital medicine team at this time. Is this patient to be included in the SEP-1 Core Measure due to severe sepsis or septic shock? No   Exclusion criteria - the patient is NOT to be included for SEP-1 Core Measure due to:  2+ SIRS criteria are not met    Discharge Time out:  CC Reviewed Yes   Test Results Yes     Vitals:    10/22/22 1456   BP: (!) 101/52   Pulse: 62   Resp: 18   Temp: 98.1 °F (36.7 °C)   SpO2: 92%              FINAL IMPRESSION      1. 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 ([facility] [address]    2. Anemia, unspecified type    3. Fatigue, unspecified type          DISPOSITION/PLAN   DISPOSITION Admitted 10/22/2022 01:01:59 PM      PATIENT REFERREDTO:  No follow-up provider specified.     DISCHARGE MEDICATIONS:  Current Discharge Medication List          DISCONTINUED MEDICATIONS:  Current Discharge Medication List                 (Please note that portions ofthis note were completed with a voice recognition program.  Efforts were made to edit the dictations but occasionally words are mis-transcribed.)    Darryle Cooper, APRN - CNP (electronically signed)       Darryle Cooper, APRN - CNP  10/22/22 3362

## 2022-10-22 NOTE — CONSENT
Informed Consent for Blood Component Transfusion Note    I have discussed with the patient the rationale for blood component transfusion; its benefits in treating or preventing fatigue, organ damage, or death; and its risk which includes mild transfusion reactions, rare risk of blood borne infection, or more serious but rare reactions. I have discussed the alternatives to transfusion, including the risk and consequences of not receiving transfusion. The patient had an opportunity to ask questions and had agreed to proceed with transfusion of blood components.     Electronically signed by Gene Vazquez MD on 10/22/22 at 1:59 PM EDT

## 2022-10-22 NOTE — ED PROVIDER NOTES
I personally saw the patient and performed a substantive portion of the visit including all aspects of the medical decision making. EKG: Sinus bradycardia, rate 59, normal axis, QTC within normal limits, no acute ST or T wave changes from prior on 8/28/2022. Possible LVH. Patient here with sickle cell anemia and associated priapism. Drainage at bedside was performed by Garry Machuca as well as myself. Phenylephrine was also injected. Detumescence ultimately achieved. Urology has been consulted. Patient has 5 priapism visits documented in the last 6 months. Has not been able to follow-up with urology due to lack of transportation. He also has symptomatic anemia and will require blood transfusion. I think it would be in his best interest to be admitted to the hospitalist service for transfusion, pain control, inpatient urology evaluation. Critical Care Time: 30min. Management of symptomatic anemia as well as priapism. For other details regarding this encounter please see Garry LUA's documentation.       Landon Lawson MD  10/22/22 [id number]

## 2022-10-22 NOTE — ED NOTES
Talked to [facility] DEYVI AFUA about critical lab values and talked to blood bank that sts blood could be delayed due to patients sickle cell status      Cassie Laguerre RN  10/22/22 5436

## 2022-10-22 NOTE — ED NOTES
Gayla Amador NP and Saeed Soria MD at bedside to drain priapism, pt is alert and oriented x4, pt was given pain medications before procedure     Enma Moreau RN  10/22/22 8816

## 2022-10-22 NOTE — RT PROTOCOL NOTE
RT Inhaler-Nebulizer Bronchodilator Protocol Note    There is a bronchodilator order in the chart from a provider indicating to follow the RT Bronchodilator Protocol and there is an Initiate RT Inhaler-Nebulizer Bronchodilator Protocol order as well (see protocol at bottom of note). CXR Findings:  No results found. The findings from the last RT Protocol Assessment were as follows:   History Pulmonary Disease: Chronic pulmonary disease  Respiratory Pattern: Regular pattern and RR 12-20 bpm  Breath Sounds: Slightly diminished and/or crackles  Cough: Strong, spontaneous, non-productive  Indication for Bronchodilator Therapy: On home bronchodilators  Bronchodilator Assessment Score: 4    Aerosolized bronchodilator medication orders have been revised according to the RT Inhaler-Nebulizer Bronchodilator Protocol below. Respiratory Therapist to perform RT Therapy Protocol Assessment initially then follow the protocol. Repeat RT Therapy Protocol Assessment PRN for score 0-3 or on second treatment, BID, and PRN for scores above 3. No Indications - adjust the frequency to every 6 hours PRN wheezing or bronchospasm, if no treatments needed after 48 hours then discontinue using Per Protocol order mode. If indication present, adjust the RT bronchodilator orders based on the Bronchodilator Assessment Score as indicated below. Use Inhaler orders unless patient has one or more of the following: on home nebulizer, not able to hold breath for 10 seconds, is not alert and oriented, cannot activate and use MDI correctly, or respiratory rate 25 breaths per minute or more, then use the equivalent nebulizer order(s) with same Frequency and PRN reasons based on the score. If a patient is on this medication at home then do not decrease Frequency below that used at home.     0-3 - enter or revise RT bronchodilator order(s) to equivalent RT Bronchodilator order with Frequency of every 4 hours PRN for wheezing or increased work of breathing using Per Protocol order mode. 4-6 - enter or revise RT Bronchodilator order(s) to two equivalent RT bronchodilator orders with one order with BID Frequency and one order with Frequency of every 4 hours PRN wheezing or increased work of breathing using Per Protocol order mode. 7-10 - enter or revise RT Bronchodilator order(s) to two equivalent RT bronchodilator orders with one order with TID Frequency and one order with Frequency of every 4 hours PRN wheezing or increased work of breathing using Per Protocol order mode. 11-13 - enter or revise RT Bronchodilator order(s) to one equivalent RT bronchodilator order with QID Frequency and an Albuterol order with Frequency of every 4 hours PRN wheezing or increased work of breathing using Per Protocol order mode. Greater than 13 - enter or revise RT Bronchodilator order(s) to one equivalent RT bronchodilator order with every 4 hours Frequency and an Albuterol order with Frequency of every 2 hours PRN wheezing or increased work of breathing using Per Protocol order mode. RT to enter RT Home Evaluation for COPD & MDI Assessment order using Per Protocol order mode.     Electronically signed by Aly Baker RCP on 10/22/2022 at 3:16 PM

## 2022-10-22 NOTE — H&P
Hospital Medicine History & Physical      PCP: Cindy Yeh MD    Date of Admission: 10/22/2022    Chief Complaint:  Priapism       History Of Present Illness:      20 y.o. male who presents via private car for priapism. Onset was this morning approximately 5 AM. Duration has been since the onset. Context includes patient presents today for evaluation of priapism since approximately 5 AM.  Patient does have history of sickle cell and recurrent priapism most recently seen in August for this. He denies any current chest pain, shortness of breath, recent illness including fevers, cough or congestion. He denies any swelling in his lower extremities or palpitations. He denies any abdominal pain, nausea, vomiting, diarrhea or constipation. He denies any urinary symptoms including dysuria, urgency, frequency, hematuria or flank pain, he states his last urine void was approximately 6 AM without difficulty. He denies any pain to his testicles. Quality is dull and aching with radiation to his penis. Alleviating factors include nothing. Aggravating factors include nothing. Pain is 10/10. Nothing has been used for pain today. Chart review reveals pt has significant PMHx of DVT, accidental overdose, asthma, enlarged heart, priapism due to sickle cell, sickle cell anemia. They take morphine, oxycodone, patient is not currently taking blood thinners. 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  Authorizing Provider   morphine (MS CONTIN) 15 MG extended release tablet  5/3/22   Historical Provider, MD DULoxetine (CYMBALTA) 30 MG extended release capsule  5/10/22   Historical Provider, MD   apixaban (ELIQUIS) 5 MG TABS tablet Take 1 tablet by mouth 2 times daily  Patient not taking: Reported on 8/27/2022 12/31/21   Abad Bar MD   pantoprazole (PROTONIX) 40 MG tablet Take 1 tablet by mouth every morning (before breakfast) 1/1/22   Abad Bar MD   folic acid (FOLVITE) 1 MG tablet Take 2 tablets by mouth daily 9/16/21   CARMELITA Girard CNP   oxyCODONE HCl (OXY-IR) 10 MG immediate release tablet Take 10 mg by mouth every 4 hours as needed for Pain. Historical Provider, MD   albuterol sulfate HFA (PROVENTIL;VENTOLIN;PROAIR) 108 (90 Base) MCG/ACT inhaler Albuterol HFA Inhaler 90 mcg/actuation  inhaled  2 puffs prn     Active    Historical Provider, MD   levalbuterol (XOPENEX) 0.31 MG/3ML nebulization Levalbuterol Nebulized    2 puffs prn     Active    Historical Provider, MD   docusate (COLACE, DULCOLAX) 100 MG CAPS Take 100 mg by mouth  Patient not taking: Reported on 8/27/2022    Historical Provider, MD   ibuprofen (ADVIL;MOTRIN) 800 MG tablet ibuprofen 800 MG Oral Tablet  oral   prn    Active    Historical Provider, MD   hydroxyurea (HYDREA) 500 MG chemo capsule Take 1,000 mg by mouth daily   Patient not taking: Reported on 8/27/2022 12/9/20   Yoel Ahn MD       Allergies:  Morphine    Social History:      TOBACCO:   reports that he has never smoked. He has never used smokeless tobacco.  ETOH:   reports that he does not currently use alcohol. E-cigarette/Vaping       Questions Responses    E-cigarette/Vaping Use Never User    Start Date     Passive Exposure     Quit Date     Counseling Given     Comments               Family History:     Reviewed and negative in regards to presenting illness/complaint. Problem Relation Age of Onset    Other Father         sarcoidosis       REVIEW OF SYSTEMS COMPLETED:   Pertinent positives as noted in the HPI.  All other systems reviewed and negative. PHYSICAL EXAM PERFORMED:    BP (!) 97/44   Pulse 63   Temp 99 °F (37.2 °C)   Resp 25   Wt 193 lb (87.5 kg)   SpO2 98%   BMI 28.50 kg/m²     General appearance:  No apparent distress, appears stated age and cooperative. HEENT:  Normal cephalic, atraumatic without obvious deformity. Pupils equal, round, and reactive to light. Extra ocular muscles intact. Conjunctivae/corneas clear. Neck: Supple, with full range of motion. No jugular venous distention. Trachea midline. Respiratory:  Normal respiratory effort. Clear to auscultation, bilaterally without Rales/Wheezes/Rhonchi. Cardiovascular:  Regular rate and rhythm with normal S1/S2 without murmurs, rubs or gallops. Abdomen: Soft, non-tender, non-distended with normal bowel sounds. Musculoskeletal:  No clubbing, cyanosis or edema bilaterally. Full range of motion without deformity. Skin: Skin color, texture, turgor normal.  No rashes or lesions. Neurologic:  Neurovascularly intact without any focal sensory/motor deficits. Cranial nerves: II-XII intact, grossly non-focal.  Psychiatric:  Alert and oriented, thought content appropriate, normal insight  Capillary Refill: Brisk,3 seconds, normal  Peripheral Pulses: +2 palpable, equal bilaterally       Labs:     Recent Labs     10/22/22  0954   WBC 16.5*   HGB 6.7*   HCT 19.3*        Recent Labs     10/22/22  0954      K 4.1      CO2 27   BUN 11   CREATININE 0.7*   CALCIUM 8.8     Recent Labs     10/22/22  0954   AST 36   ALT 23   BILITOT 5.6*   ALKPHOS 71     No results for input(s): INR in the last 72 hours.   Recent Labs     10/22/22  0954   TROPONINI <0.01       Urinalysis:      Lab Results   Component Value Date/Time    NITRU Negative 10/22/2022 10:58 AM    WBCUA 0-2 05/27/2022 07:00 AM    BACTERIA 2+ 05/27/2022 07:00 AM    RBCUA 3-4 05/27/2022 07:00 AM    BLOODU MODERATE 10/22/2022 10:58 AM    SPECGRAV 1.015 10/22/2022 10:58 AM    GLUCOSEU Negative 10/22/2022 10:58 AM Radiology:     CXR: I have reviewed the CXR   EKG:  I have reviewed the EKG     XR CHEST (2 VW)   Final Result   No acute cardiopulmonary disease. Consults:    IP CONSULT TO UROLOGY    ASSESSMENT:    Priapism: Due to sickle crisis. improved with aspiration in ED. Sickle cell crisis  Asthma  Right lower  extremity DVT history    PLAN:    Admit to medsurg unit  Aggressive IV hydration   1 U PRBC,   Monitor CBC  Obtain urology consult   Obtain Hematology consult   Albuterol ipratropium prn   Will restart eliquis one anemia improves     DVT Prophylaxis: SCD  Diet: No diet orders on file  Code Status: Prior    PT/OT Eval Status: no    Haseeb Atkinson MD    Thank you Melissa Goodrich MD for the opportunity to be involved in this patient's care. If you have any questions or concerns please feel free to contact me at [phone].

## 2022-10-22 NOTE — CONSULTS
Consult placed to Hem/Onc    Who:  Dr. Andre Bella  Date:10/22/2022,  Time:3:09 PM        Electronically signed by Huma Merchant on 10/22/2022 at 3:09 PM

## 2022-10-22 NOTE — PROGRESS NOTES
Patient declined to have a 4 eye assessment completed at this time, stating it could be done later. Will pass to oncoming nurse to complete.

## 2022-10-22 NOTE — PLAN OF CARE
From: Jeanette Wan  To: Stephanie Rodriguez  Sent: 7/28/2022 8:28 PM CDT  Subject: Is there a way to arrange transfusion without ER visit? Dear Dr. Annamarie Landaverde pretty sure Angela Rubio is still anemic. Dr. Jaylan Kahn prescribed mycophenylate (which should arrive tomorrow) and ordered weekly CBC and metabolic panel. When Angela Rubio went to the lab, they couldn't hit his vein and Angela Rubio walked out without the draw. The hardening of his skin and the repeated blood draws make it very difficult to hit his veins. He has chosen not to go back for labs, both because of his veins and because he believes it will just result in another hospitalization for transfusions with blood draws multiple times a day that will tear his veins up more. Is there any way for Angela Rubio to get transfusions without going to the ER and going in-patient?     Thank you,  Olamide Waters Problem: Pain  Goal: Verbalizes/displays adequate comfort level or baseline comfort level  Flowsheets (Taken 10/22/2022 1704)  Verbalizes/displays adequate comfort level or baseline comfort level:   Assess pain using appropriate pain scale   Encourage patient to monitor pain and request assistance   Administer analgesics based on type and severity of pain and evaluate response   Implement non-pharmacological measures as appropriate and evaluate response     Problem: Safety - Adult  Goal: Free from fall injury  Flowsheets (Taken 10/22/2022 1704)  Free From Fall Injury:   Instruct family/caregiver on patient safety   Based on caregiver fall risk screen, instruct family/caregiver to ask for assistance with transferring infant if caregiver noted to have fall risk factors

## 2022-10-22 NOTE — CONSULTS
[id number] - PS to Dr Nichole Blankenship at [facility]  Re: recurrent priapism in sickle cell patient  4308 - Dr Nichole Blankenship called back to speak with AFUA Toney

## 2022-10-22 NOTE — ED NOTES
Dr. Dolores Armendariz at bedside, talked to provider about possibly of needing a blood transfusion, Dr. Dolores Armendariz to put blood orders in      [place], RN  10/22/22 [address] [facility]  10/22/22 4081

## 2022-10-23 LAB
A/G RATIO: 1.9 (ref 1.1–2.2)
ALBUMIN SERPL-MCNC: 3.7 G/DL (ref 3.4–5)
ALP BLD-CCNC: 68 U/L (ref 40–129)
ALT SERPL-CCNC: 20 U/L (ref 10–40)
ANION GAP SERPL CALCULATED.3IONS-SCNC: 8 MMOL/L (ref 3–16)
AST SERPL-CCNC: 31 U/L (ref 15–37)
BASOPHILS ABSOLUTE: 0.1 K/UL (ref 0–0.2)
BASOPHILS RELATIVE PERCENT: 0.8 %
BILIRUB SERPL-MCNC: 4.5 MG/DL (ref 0–1)
BILIRUBIN DIRECT: 0.5 MG/DL (ref 0–0.3)
BILIRUBIN, INDIRECT: 4 MG/DL (ref 0–1)
BUN BLDV-MCNC: 10 MG/DL (ref 7–20)
CALCIUM SERPL-MCNC: 8.7 MG/DL (ref 8.3–10.6)
CHLORIDE BLD-SCNC: 107 MMOL/L (ref 99–110)
CO2: 27 MMOL/L (ref 21–32)
CREAT SERPL-MCNC: 0.6 MG/DL (ref 0.9–1.3)
EOSINOPHILS ABSOLUTE: 0.3 K/UL (ref 0–0.6)
EOSINOPHILS RELATIVE PERCENT: 2.1 %
GFR SERPL CREATININE-BSD FRML MDRD: >60 ML/MIN/{1.73_M2}
GLUCOSE BLD-MCNC: 80 MG/DL (ref 70–99)
HCT VFR BLD CALC: 18.1 % (ref 40.5–52.5)
HCT VFR BLD CALC: 23.7 % (ref 40.5–52.5)
HEMOGLOBIN: 6.6 G/DL (ref 13.5–17.5)
HEMOGLOBIN: 8.5 G/DL (ref 13.5–17.5)
IMMATURE RETIC FRACT: 0.76 (ref 0.21–0.37)
LYMPHOCYTES ABSOLUTE: 3.2 K/UL (ref 1–5.1)
LYMPHOCYTES RELATIVE PERCENT: 26.6 %
MCH RBC QN AUTO: 34.7 PG (ref 26–34)
MCHC RBC AUTO-ENTMCNC: 36.5 G/DL (ref 31–36)
MCV RBC AUTO: 95.1 FL (ref 80–100)
MONOCYTES ABSOLUTE: 1.6 K/UL (ref 0–1.3)
MONOCYTES RELATIVE PERCENT: 13.2 %
NEUTROPHILS ABSOLUTE: 7 K/UL (ref 1.7–7.7)
NEUTROPHILS RELATIVE PERCENT: 57.3 %
PDW BLD-RTO: 22.4 % (ref 12.4–15.4)
PLATELET # BLD: 308 K/UL (ref 135–450)
PMV BLD AUTO: 7.9 FL (ref 5–10.5)
POTASSIUM SERPL-SCNC: 4.3 MMOL/L (ref 3.5–5.1)
RBC # BLD: 1.9 M/UL (ref 4.2–5.9)
RETICULOCYTE ABSOLUTE COUNT: 0.18 M/UL
RETICULOCYTE COUNT PCT: 9.3 % (ref 0.5–2.18)
SODIUM BLD-SCNC: 142 MMOL/L (ref 136–145)
TOTAL PROTEIN: 5.7 G/DL (ref 6.4–8.2)
WBC # BLD: 12.2 K/UL (ref 4–11)

## 2022-10-23 PROCEDURE — 6370000000 HC RX 637 (ALT 250 FOR IP): Performed by: NURSE PRACTITIONER

## 2022-10-23 PROCEDURE — 6360000002 HC RX W HCPCS: Performed by: INTERNAL MEDICINE

## 2022-10-23 PROCEDURE — 82248 BILIRUBIN DIRECT: CPT

## 2022-10-23 PROCEDURE — 36415 COLL VENOUS BLD VENIPUNCTURE: CPT

## 2022-10-23 PROCEDURE — 80053 COMPREHEN METABOLIC PANEL: CPT

## 2022-10-23 PROCEDURE — 85025 COMPLETE CBC W/AUTO DIFF WBC: CPT

## 2022-10-23 PROCEDURE — 85018 HEMOGLOBIN: CPT

## 2022-10-23 PROCEDURE — 1200000000 HC SEMI PRIVATE

## 2022-10-23 PROCEDURE — 85045 AUTOMATED RETICULOCYTE COUNT: CPT

## 2022-10-23 PROCEDURE — 36430 TRANSFUSION BLD/BLD COMPNT: CPT

## 2022-10-23 PROCEDURE — 6370000000 HC RX 637 (ALT 250 FOR IP): Performed by: INTERNAL MEDICINE

## 2022-10-23 PROCEDURE — 85014 HEMATOCRIT: CPT

## 2022-10-23 PROCEDURE — 2580000003 HC RX 258: Performed by: INTERNAL MEDICINE

## 2022-10-23 RX ORDER — OXYCODONE AND ACETAMINOPHEN 10; 325 MG/1; MG/1
2 TABLET ORAL EVERY 4 HOURS PRN
Status: DISCONTINUED | OUTPATIENT
Start: 2022-10-23 | End: 2022-10-24 | Stop reason: HOSPADM

## 2022-10-23 RX ORDER — KETOROLAC TROMETHAMINE 30 MG/ML
30 INJECTION, SOLUTION INTRAMUSCULAR; INTRAVENOUS 3 TIMES DAILY
Status: DISCONTINUED | OUTPATIENT
Start: 2022-10-23 | End: 2022-10-24 | Stop reason: HOSPADM

## 2022-10-23 RX ORDER — OXYCODONE AND ACETAMINOPHEN 10; 325 MG/1; MG/1
1 TABLET ORAL EVERY 4 HOURS PRN
Status: DISCONTINUED | OUTPATIENT
Start: 2022-10-23 | End: 2022-10-24 | Stop reason: HOSPADM

## 2022-10-23 RX ADMIN — HYDROMORPHONE HYDROCHLORIDE 1 MG: 1 INJECTION, SOLUTION INTRAMUSCULAR; INTRAVENOUS; SUBCUTANEOUS at 21:33

## 2022-10-23 RX ADMIN — FOLIC ACID 2 MG: 1 TABLET ORAL at 09:03

## 2022-10-23 RX ADMIN — SODIUM CHLORIDE: 9 INJECTION, SOLUTION INTRAVENOUS at 10:02

## 2022-10-23 RX ADMIN — HYDROMORPHONE HYDROCHLORIDE 1 MG: 1 INJECTION, SOLUTION INTRAMUSCULAR; INTRAVENOUS; SUBCUTANEOUS at 13:02

## 2022-10-23 RX ADMIN — PANTOPRAZOLE SODIUM 40 MG: 40 TABLET, DELAYED RELEASE ORAL at 05:35

## 2022-10-23 RX ADMIN — SODIUM CHLORIDE: 9 INJECTION, SOLUTION INTRAVENOUS at 23:29

## 2022-10-23 RX ADMIN — SODIUM CHLORIDE: 9 INJECTION, SOLUTION INTRAVENOUS at 16:28

## 2022-10-23 RX ADMIN — KETOROLAC TROMETHAMINE 30 MG: 30 INJECTION, SOLUTION INTRAMUSCULAR at 21:22

## 2022-10-23 RX ADMIN — HYDROMORPHONE HYDROCHLORIDE 1 MG: 1 INJECTION, SOLUTION INTRAMUSCULAR; INTRAVENOUS; SUBCUTANEOUS at 17:02

## 2022-10-23 RX ADMIN — HYDROMORPHONE HYDROCHLORIDE 1 MG: 1 INJECTION, SOLUTION INTRAMUSCULAR; INTRAVENOUS; SUBCUTANEOUS at 04:41

## 2022-10-23 RX ADMIN — KETOROLAC TROMETHAMINE 30 MG: 30 INJECTION, SOLUTION INTRAMUSCULAR at 14:13

## 2022-10-23 RX ADMIN — KETOROLAC TROMETHAMINE 30 MG: 30 INJECTION, SOLUTION INTRAMUSCULAR at 09:03

## 2022-10-23 RX ADMIN — OXYCODONE AND ACETAMINOPHEN 2 TABLET: 325; 10 TABLET ORAL at 23:30

## 2022-10-23 RX ADMIN — HYDROMORPHONE HYDROCHLORIDE 1 MG: 1 INJECTION, SOLUTION INTRAMUSCULAR; INTRAVENOUS; SUBCUTANEOUS at 00:19

## 2022-10-23 RX ADMIN — OXYCODONE AND ACETAMINOPHEN 1 TABLET: 5; 325 TABLET ORAL at 14:12

## 2022-10-23 RX ADMIN — HYDROMORPHONE HYDROCHLORIDE 1 MG: 1 INJECTION, SOLUTION INTRAMUSCULAR; INTRAVENOUS; SUBCUTANEOUS at 09:03

## 2022-10-23 RX ADMIN — Medication 10 ML: at 21:22

## 2022-10-23 RX ADMIN — Medication 10 ML: at 09:03

## 2022-10-23 ASSESSMENT — PAIN DESCRIPTION - FREQUENCY: FREQUENCY: CONTINUOUS

## 2022-10-23 ASSESSMENT — PAIN DESCRIPTION - LOCATION
LOCATION: GENERALIZED
LOCATION: HIP
LOCATION: HIP
LOCATION: GENERALIZED
LOCATION: HIP
LOCATION: GENERALIZED

## 2022-10-23 ASSESSMENT — PAIN DESCRIPTION - DESCRIPTORS
DESCRIPTORS: ACHING
DESCRIPTORS: ACHING;SHARP
DESCRIPTORS: ACHING;SHARP
DESCRIPTORS: ACHING
DESCRIPTORS: ACHING;SHARP
DESCRIPTORS: ACHING
DESCRIPTORS: ACHING;SHARP
DESCRIPTORS: ACHING;SHARP

## 2022-10-23 ASSESSMENT — PAIN SCALES - GENERAL
PAINLEVEL_OUTOF10: 7
PAINLEVEL_OUTOF10: 8
PAINLEVEL_OUTOF10: 7
PAINLEVEL_OUTOF10: 4
PAINLEVEL_OUTOF10: 8
PAINLEVEL_OUTOF10: 5
PAINLEVEL_OUTOF10: 8
PAINLEVEL_OUTOF10: 7
PAINLEVEL_OUTOF10: 9
PAINLEVEL_OUTOF10: 8
PAINLEVEL_OUTOF10: 7
PAINLEVEL_OUTOF10: 8

## 2022-10-23 ASSESSMENT — PAIN - FUNCTIONAL ASSESSMENT
PAIN_FUNCTIONAL_ASSESSMENT: ACTIVITIES ARE NOT PREVENTED

## 2022-10-23 ASSESSMENT — PAIN DESCRIPTION - ORIENTATION
ORIENTATION: RIGHT;LEFT
ORIENTATION: LEFT;RIGHT
ORIENTATION: RIGHT;LEFT
ORIENTATION: RIGHT;LEFT

## 2022-10-23 ASSESSMENT — PAIN DESCRIPTION - PAIN TYPE: TYPE: ACUTE PAIN

## 2022-10-23 ASSESSMENT — PAIN DESCRIPTION - ONSET: ONSET: GRADUAL

## 2022-10-23 NOTE — PROGRESS NOTES
Hospitalist Progress Note      PCP: Nolvia Duenas MD    Date of Admission: 10/22/2022    Chief Complaint: Hip pain, priapism     Hospital Course: 20 y.o. male who presents via private car for priapism. Onset was this morning approximately 5 AM. Duration has been since the onset. Context includes patient presents today for evaluation of priapism since approximately 5 AM.  Patient does have history of sickle cell and recurrent priapism most recently seen in August for this. He denies any current chest pain, shortness of breath, recent illness including fevers, cough or congestion. He denies any swelling in his lower extremities or palpitations. He denies any abdominal pain, nausea, vomiting, diarrhea or constipation. He denies any urinary symptoms including dysuria, urgency, frequency, hematuria or flank pain, he states his last urine void was approximately 6 AM without difficulty. He denies any pain to his testicles. Quality is dull and aching with radiation to his penis. Alleviating factors include nothing. Aggravating factors include nothing. Pain is 10/10. Nothing has been used for pain today. Chart review reveals pt has significant PMHx of DVT, accidental overdose, asthma, enlarged heart, priapism due to sickle cell, sickle cell anemia. They take morphine, oxycodone, patient is not currently taking blood thinners. Subjective:   Priapism has improved. Patient complains of hip pain. Patient is requiring IV Dilaudid. Patient has been seen by oncologist today. Hemoglobin 6.6 this morning. 1 unit PRBC ordered. WBC trending down.       Medications:  Reviewed    Infusion Medications    sodium chloride      sodium chloride 150 mL/hr at 10/23/22 1002    sodium chloride       Scheduled Medications    ketorolac  30 mg IntraVENous TID    pantoprazole  40 mg Oral QAM AC    hydroxyurea  1,000 mg Oral Daily    folic acid  2 mg Oral Daily    sodium chloride flush  5-40 mL IntraVENous 2 times per day    enoxaparin  40 mg SubCUTAneous Daily     PRN Meds: phenylephrine, sodium chloride flush, sodium chloride, ondansetron **OR** ondansetron, polyethylene glycol, acetaminophen **OR** acetaminophen, albuterol, sodium chloride, oxyCODONE-acetaminophen, HYDROmorphone      Intake/Output Summary (Last 24 hours) at 10/23/2022 1609  Last data filed at 10/23/2022 1411  Gross per 24 hour   Intake 3886.74 ml   Output 500 ml   Net 3386.74 ml       Physical Exam Performed:    /63   Pulse 73   Temp 98.1 °F (36.7 °C)   Resp 18   Ht 5' 9\" (1.753 m)   Wt 199 lb 8.3 oz (90.5 kg)   SpO2 93%   BMI 29.46 kg/m²     General appearance: No apparent distress, appears stated age and cooperative. HEENT: Pupils equal, round, and reactive to light. Conjunctivae/corneas clear. Neck: Supple, with full range of motion. No jugular venous distention. Trachea midline. Respiratory:  Normal respiratory effort. Clear to auscultation, bilaterally without Rales/Wheezes/Rhonchi. Cardiovascular: Regular rate and rhythm with normal S1/S2 without murmurs, rubs or gallops. Abdomen: Soft, non-tender, non-distended with normal bowel sounds. Musculoskeletal: No clubbing, cyanosis or edema bilaterally. Full range of motion without deformity. Skin: Skin color, texture, turgor normal.  No rashes or lesions. Neurologic:  Neurovascularly intact without any focal sensory/motor deficits.  Cranial nerves: II-XII intact, grossly non-focal.  Psychiatric: Alert and oriented, thought content appropriate, normal insight  Capillary Refill: Brisk, 3 seconds, normal   Peripheral Pulses: +2 palpable, equal bilaterally       Labs:   Recent Labs     10/22/22  0954 10/23/22  0506 10/23/22  1135   WBC 16.5* 12.2*  --    HGB 6.7* 6.6* 8.5*   HCT 19.3* 18.1* 23.7*    308  --      Recent Labs     10/22/22  0954 10/23/22  0507    142   K 4.1 4.3    107   CO2 27 27   BUN 11 10   CREATININE 0.7* 0.6*   CALCIUM 8.8 8.7     Recent Labs 10/22/22  0954 10/23/22  0507   AST 36 31   ALT 23 20   BILIDIR  --  0.5*   BILITOT 5.6* 4.5*   ALKPHOS 71 68     No results for input(s): INR in the last 72 hours. Recent Labs     10/22/22  0954   TROPONINI <0.01       Urinalysis:      Lab Results   Component Value Date/Time    NITRU Negative 10/22/2022 10:58 AM    WBCUA 0-2 10/22/2022 10:58 AM    BACTERIA 3+ 10/22/2022 10:58 AM    RBCUA 0-2 10/22/2022 10:58 AM    BLOODU MODERATE 10/22/2022 10:58 AM    SPECGRAV 1.015 10/22/2022 10:58 AM    GLUCOSEU Negative 10/22/2022 10:58 AM       Radiology:  XR CHEST (2 VW)   Final Result   No acute cardiopulmonary disease. Assessment/Plan:    Active Hospital Problems    Diagnosis     Priapism [N48.30]      Priority: Medium       Priapism: Due to sickle crisis. improved with penile aspiration in ED. Sickle cell crisis  Asthma  Right lower  extremity DVT history     PLAN:     Aggressive IV hydration   1 U PRBC,   Monitor CBC, reticulocyte count  Continue folic acid  Continue hydroxyurea  Obtain urology consult   Obtain Hematology consult. Recommendations appreciated.   Albuterol ipratropium prn   Will restart eliquis one anemia improves      DVT Prophylaxis: SCD  Diet: No diet orders on file  Code Status: Prior     PT/OT Eval Status: no     Dispo - Home    Appropriate for A1 Discharge Unit: No      Zohra King MD

## 2022-10-23 NOTE — PROGRESS NOTES
Critical lab  Hgb/Hct: 6.6/18. 1. Transfusion order on chart, however, blood is not available for transfusion. Dr. Clarence Sepulveda informed via perfect serve.

## 2022-10-23 NOTE — PROGRESS NOTES
Blood transfusion finalized at this time. VS remains stable, no reaction noted. Pt tolerated infusion well, post transfusion H/H ordered per protocol.

## 2022-10-23 NOTE — PLAN OF CARE
Problem: Pain  Goal: Verbalizes/displays adequate comfort level or baseline comfort level  10/22/2022 2038 by Cata Mcfarlane RN  Outcome: Progressing  Flowsheets (Taken 10/22/2022 2009)  Verbalizes/displays adequate comfort level or baseline comfort level:   Encourage patient to monitor pain and request assistance   Assess pain using appropriate pain scale  10/22/2022 1704 by Michaela Hannon RN  Flowsheets (Taken 10/22/2022 1704)  Verbalizes/displays adequate comfort level or baseline comfort level:   Assess pain using appropriate pain scale   Encourage patient to monitor pain and request assistance   Administer analgesics based on type and severity of pain and evaluate response   Implement non-pharmacological measures as appropriate and evaluate response     Problem: Safety - Adult  Goal: Free from fall injury  10/22/2022 2038 by Cata Mcfarlane RN  Outcome: Progressing  10/22/2022 1704 by Michaela Hannon RN  Flowsheets (Taken 10/22/2022 1704)  Free From Fall Injury:   Instruct family/caregiver on patient safety   Based on caregiver fall risk screen, instruct family/caregiver to ask for assistance with transferring infant if caregiver noted to have fall risk factors

## 2022-10-23 NOTE — CONSULTS
Urology Attending Consult Note      Reason for Consultation: priapism    CC: \"I still have some pain in the penis today\"    History: 23 y/o male has sicle cell disease and recurrent priapism. He has been non-compliant and fails to f/u as outpatient. Presented yesterday with erection greater than 4 hrs. ER gave Phenylephrine injection into penis and he then had detumescence     In the past seen Manjinder Medeiros MD.  At that time in 2022 - but he never followed up, no showed to his Oct 5th visit? Plan  sildenafil 20mg po qd  Sudafed OTC prn for nighttime erections. Ice packs, cold shower discussed  I discussed baclofen for painful sleep related erections - hold off for now  F/u 2-3 months      Family History, Social History, Review of Systems:  Reviewed and agreed to as per chart    Vitals:  /66   Pulse 67   Temp 98.5 °F (36.9 °C) (Oral)   Resp 18   Ht 5' 9\" (1.753 m)   Wt 199 lb 8.3 oz (90.5 kg)   SpO2 91%   BMI 29.46 kg/m²   Temp  Av.1 °F (36.7 °C)  Min: 97.9 °F (36.6 °C)  Max: 98.5 °F (36.9 °C)    Intake/Output Summary (Last 24 hours) at 10/23/2022 1745  Last data filed at 10/23/2022 1626  Gross per 24 hour   Intake 4942.76 ml   Output 500 ml   Net 4442.76 ml         Physical:  Well developed, well nourished in no acute distress  Mood indicates no abnormalities. Pt doesnt appear depressed  Orientated to time and place  Neck is supple, trachea is midline  Respiratory effort is normal  Cardiovascular show no extremity swelling  Abdomen no masses or hernias are palpated, there is no tenderness.  Skin show no abnormal lesions       Male :  Penis appears normal and circumcised, flacid but tender  Urethral meatus is normal in size and location  Scrotum appears normal and both testicles appear normal in size and location      Labs:  WBC:    Lab Results   Component Value Date/Time    WBC 12.2 10/23/2022 05:06 AM     Hemoglobin/Hematocrit:    Lab Results   Component Value Date/Time    HGB 8.5 10/23/2022 11:35 AM    HCT 23.7 10/23/2022 11:35 AM     BMP:    Lab Results   Component Value Date/Time     10/23/2022 05:07 AM    K 4.3 10/23/2022 05:07 AM    K 4.1 10/22/2022 09:54 AM     10/23/2022 05:07 AM    CO2 27 10/23/2022 05:07 AM    BUN 10 10/23/2022 05:07 AM    LABALBU 3.7 10/23/2022 05:07 AM    CREATININE 0.6 10/23/2022 05:07 AM    CALCIUM 8.7 10/23/2022 05:07 AM    GFRAA >60 08/29/2022 02:12 AM    LABGLOM >60 10/23/2022 05:07 AM     PT/INR:    Lab Results   Component Value Date/Time    PROTIME 15.5 08/28/2022 06:48 AM    INR 1.24 08/28/2022 06:48 AM     PTT:    Lab Results   Component Value Date/Time    APTT 30.8 08/28/2022 06:48 AM   [APTT    Impression/Plan:   Sickle Cell anemia  Recurrent priapsim -  now in detumescence    plan in the past had been   sildenafil 20mg po qd  Sudafed OTC prn for nighttime erections.  Ice packs, cold shower   I discussed baclofen for painful sleep related erections - hold off for now    F/u 1-2 weeks with Porsha Shaw MD

## 2022-10-23 NOTE — PROGRESS NOTES
Requesting IV narcotics around the clock, calls out even before the 4 hour tonia, refusing PO narcotics. Encouraged to get OOB, jump on the shower, tidy up room without success. Will continue to monitor.

## 2022-10-24 VITALS
SYSTOLIC BLOOD PRESSURE: 126 MMHG | OXYGEN SATURATION: 92 % | HEART RATE: 72 BPM | BODY MASS INDEX: 29.55 KG/M2 | RESPIRATION RATE: 18 BRPM | TEMPERATURE: 97.9 F | WEIGHT: 199.52 LBS | DIASTOLIC BLOOD PRESSURE: 79 MMHG | HEIGHT: 69 IN

## 2022-10-24 PROCEDURE — 6370000000 HC RX 637 (ALT 250 FOR IP): Performed by: INTERNAL MEDICINE

## 2022-10-24 PROCEDURE — 6360000002 HC RX W HCPCS: Performed by: INTERNAL MEDICINE

## 2022-10-24 PROCEDURE — 6370000000 HC RX 637 (ALT 250 FOR IP): Performed by: NURSE PRACTITIONER

## 2022-10-24 PROCEDURE — 2580000003 HC RX 258: Performed by: INTERNAL MEDICINE

## 2022-10-24 RX ORDER — SILDENAFIL CITRATE 20 MG/1
20 TABLET ORAL DAILY
Qty: 30 TABLET | Refills: 0 | Status: SHIPPED | OUTPATIENT
Start: 2022-10-24 | End: 2022-11-23

## 2022-10-24 RX ADMIN — HYDROMORPHONE HYDROCHLORIDE 1 MG: 1 INJECTION, SOLUTION INTRAMUSCULAR; INTRAVENOUS; SUBCUTANEOUS at 05:55

## 2022-10-24 RX ADMIN — FOLIC ACID 2 MG: 1 TABLET ORAL at 07:41

## 2022-10-24 RX ADMIN — SODIUM CHLORIDE: 9 INJECTION, SOLUTION INTRAVENOUS at 05:59

## 2022-10-24 RX ADMIN — OXYCODONE AND ACETAMINOPHEN 2 TABLET: 325; 10 TABLET ORAL at 07:44

## 2022-10-24 RX ADMIN — OXYCODONE AND ACETAMINOPHEN 2 TABLET: 325; 10 TABLET ORAL at 11:46

## 2022-10-24 RX ADMIN — KETOROLAC TROMETHAMINE 30 MG: 30 INJECTION, SOLUTION INTRAMUSCULAR at 07:41

## 2022-10-24 RX ADMIN — PANTOPRAZOLE SODIUM 40 MG: 40 TABLET, DELAYED RELEASE ORAL at 05:56

## 2022-10-24 RX ADMIN — HYDROMORPHONE HYDROCHLORIDE 1 MG: 1 INJECTION, SOLUTION INTRAMUSCULAR; INTRAVENOUS; SUBCUTANEOUS at 01:39

## 2022-10-24 ASSESSMENT — PAIN DESCRIPTION - DESCRIPTORS
DESCRIPTORS: ACHING;SHARP

## 2022-10-24 ASSESSMENT — PAIN - FUNCTIONAL ASSESSMENT
PAIN_FUNCTIONAL_ASSESSMENT: ACTIVITIES ARE NOT PREVENTED

## 2022-10-24 ASSESSMENT — PAIN DESCRIPTION - ORIENTATION
ORIENTATION: RIGHT;LEFT

## 2022-10-24 ASSESSMENT — PAIN SCALES - GENERAL
PAINLEVEL_OUTOF10: 7
PAINLEVEL_OUTOF10: 8
PAINLEVEL_OUTOF10: 0
PAINLEVEL_OUTOF10: 7
PAINLEVEL_OUTOF10: 7
PAINLEVEL_OUTOF10: 8

## 2022-10-24 ASSESSMENT — PAIN DESCRIPTION - LOCATION
LOCATION: HIP
LOCATION: GENERALIZED
LOCATION: HIP
LOCATION: HIP

## 2022-10-24 NOTE — PROGRESS NOTES
ONCOLOGY HEMATOLOGY CARE PROGRESS NOTE      SUBJECTIVE:     Xavi Davidson returns for follow-up. He is doing well. He has no new complaints. He has priapism has been relieved. ROS:     Constitutional:  No weight loss, No fever, No chills, No night sweats. Energy level good. Eyes:  No impairment or change in vision  ENT / Mouth:  No pain, abnormal ulceration, bleeding, nasal drip or change in voice or hearing  Cardiovascular:  No chest pain, palpitations, new edema, or calf discomfort  Respiratory:  No pain, hemoptysis, change to breathing  Breast:  No pain, discharge, change in appearance or texture  Gastrointestinal:  No pain, cramping, jaundice, change to eating and bowel habits  Urinary:  No pain, bleeding or change in continence  Genitalia: No pain, bleeding or discharge  Musculoskeletal:  No redness, pain, edema or weakness  Skin:  No pruritus, rash, change to nodules or lesions  Neurologic:  No discomfort, change in mental status, speech, sensory or motor activity  Psychiatric:  No change in concentration or change to affect or mood  Endocrine:  No hot flashes, increased thirst, or change to urine production  Hematologic: No petechiae, ecchymosis or bleeding  Lymphatic:  No lymphadenopathy or lymphedema  Allergy / Immunologic:  No eczema, hives, frequent or recurrent infections    OBJECTIVE        Physical    VITALS:  /79   Pulse 72   Temp 97.9 °F (36.6 °C) (Oral)   Resp 16   Ht 5' 9\" (1.753 m)   Wt 199 lb 8.3 oz (90.5 kg)   SpO2 92%   BMI 29.46 kg/m²   TEMPERATURE:  Current - Temp: 97.9 °F (36.6 °C);  Max - Temp  Av.2 °F (36.8 °C)  Min: 97.9 °F (36.6 °C)  Max: 98.5 °F (36.9 °C)  PULSE OXIMETRY RANGE: SpO2  Av.3 %  Min: 91 %  Max: 93 %  24HR INTAKE/OUTPUT:    Intake/Output Summary (Last 24 hours) at 10/24/2022 0956  Last data filed at 10/24/2022 0745  Gross per 24 hour   Intake 6159.95 ml   Output 1450 ml   Net 4709.95 ml       CONSTITUTIONAL: awake, alert, cooperative, no apparent distress   EYES: pupils equal, round and reactive to light, sclera clear and conjunctiva normal  ENT: Normocephalic, without obvious abnormality, atraumatic  NECK: supple, symmetrical, no jugular venous distension and no carotid bruits   HEMATOLOGIC/LYMPHATIC: no cervical, supraclavicular or axillary lymphadenopathy   LUNGS: no increased work of breathing and clear to auscultation   CARDIOVASCULAR: regular rate and rhythm, normal S1 and S2, no murmur noted  ABDOMEN: normal bowel sounds x 4, soft, non-distended, non-tender, no masses palpated, no hepatosplenomgaly   MUSCULOSKELETAL: full range of motion noted, tone is normal  NEUROLOGIC: awake, alert, oriented to name, place and time. Motor skills grossly intact. SKIN: Normal skin color, texture, turgor and no jaundice.  appears intact   EXTREMITIES: no LE edema       Data  Recent Labs     10/23/22  1135 10/23/22  0506 10/22/22  0954   WBC  --  12.2* 16.5*   NEUTROABS  --  7.0 12.1*   LYMPHOPCT  --  26.6 14.8   RBC  --  1.90* 2.00*   HGB 8.5* 6.6* 6.7*   HCT 23.7* 18.1* 19.3*   MCV  --  95.1 96.6   MCH  --  34.7* 33.7   MCHC  --  36.5* 34.9   RDW  --  22.4* 23.4*   PLT  --  308 364        Recent Labs     10/22/22  0954 10/23/22  0507    142   K 4.1 4.3    107   CO2 27 27   BUN 11 10   CREATININE 0.7* 0.6*     Recent Labs     10/22/22  0954 10/23/22  0507   AST 36 31   ALT 23 20   BILIDIR  --  0.5*   BILITOT 5.6* 4.5*   ALKPHOS 71 68       Magnesium:    Lab Results   Component Value Date/Time    MG 1.90 05/24/2022 05:43 AM    MG 1.90 05/03/2022 05:04 AM    MG 2.20 05/02/2022 06:18 AM       Imaging  XR CHEST (2 VW)  Narrative: EXAMINATION:  TWO XRAY VIEWS OF THE CHEST    10/22/2022 12:01 pm    COMPARISON:  [facility] PA, 08/27/2022    HISTORY:  ORDERING SYSTEM PROVIDED HISTORY: elevated wbc hx scd  TECHNOLOGIST PROVIDED HISTORY:  Reason for exam:->elevated wbc hx scd  Reason for Exam: elevated wbc hx scd    FINDINGS:  The cardiac silhouette, mediastinal and hilar contours are normal.  The lungs  are clear. There are no pleural effusions. No acute osseous abnormalities are  identified. Impression: No acute cardiopulmonary disease. Problem List  Patient Active Problem List   Diagnosis    Sickle cell pain crisis (HCC)    Asthma    Sickle cell anemia (HCC)    Sepsis ([facility]    Opiate overdose ([facility])    Opiate antagonist poisoning, accidental or unintentional, initial encounter    Leukocytosis    Hypoxia    Anemia    Other chest pain    Pneumonia due to infectious organism    Fever    Chronic prescription opiate use    Right leg pain    Dental infection    Sickle cell crisis ([facility] [address]    History of DVT in adulthood    Overweight (BMI 25.0-29. 9)    Chest pain    Abnormal EKG    Acute chest syndrome due to sickle-cell disease ([facility])    Priapism       ASSESSMENT AND PLAN:    Sickle cell anemia:  -Currently on Hydrea 1000 mg p.o. daily  -Crizanlizumab was not effective and was stopped  -Have not been able to get him Geovanna Pink, but will try again  -His priapism has resolved.      Compliance:  -He refuses to seek counseling  -He refuses a support group  -He has been fired from at least 1 pain management group and cannot get another one to take him     Pain:  -He takes oxycodone 5 mg to 10 mg p.o. q. 6 as needed  -He receives 50 a week and we are strictly adherent to this  -I have actually gotten him off MS Contin and he had a problem with an accidental overdose when he was taking MS Contin  -His prescriptions are filled weekly  -We do have trouble that he will take his medications too quickly and a lot of times will show up in the emergency department  -I try to supplement him with Toradol as an outpatient and in the emergency department to help with narcotic use  -I will occasionally give him 1 or 2 days of MS Contin when he has a crisis     Anemia:  -I agree with packed red blood cells          ONCOLOGIC DISPOSITION:  -I will prescribe his oxycodone through our office charting  -He gets a limited number per week due to compliance issues    Dain Granados MD  Please contact through [facility]

## 2022-10-24 NOTE — PLAN OF CARE
Problem: Pain  Goal: Verbalizes/displays adequate comfort level or baseline comfort level  10/24/2022 1135 by Chino Veronica RN  Outcome: Adequate for Discharge  10/24/2022 0408 by Marianela Shore RN  Outcome: Progressing     Problem: Safety - Adult  Goal: Free from fall injury  10/24/2022 1135 by Chino Veronica RN  Outcome: Adequate for Discharge  10/24/2022 0408 by Marianela Shore RN  Outcome: Adequate for Discharge     Problem: Genitourinary - Adult  Goal: Absence of urinary retention  10/24/2022 1135 by Chino Veronica RN  Outcome: Adequate for Discharge  10/24/2022 0408 by Marianela Shore RN  Outcome: Adequate for Discharge

## 2022-10-24 NOTE — DISCHARGE INSTRUCTIONS
Sildenafil 20mg po qd  Sudafed OTC prn for nighttime erections.  Ice packs, cold shower discussed  Pain medicine per Oncology    Return for any concerns

## 2022-10-24 NOTE — PLAN OF CARE
Transitioning to home dose of oxycodone. Standard safety measures in place. Ambulates independently in room with steady gait. Denies any difficulty with urination.   Problem: Genitourinary - Adult  Goal: Absence of urinary retention  Outcome: Adequate for Discharge     Problem: Safety - Adult  Goal: Free from fall injury  Outcome: Adequate for Discharge     Problem: Pain  Goal: Verbalizes/displays adequate comfort level or baseline comfort level  Outcome: Progressing

## 2022-10-24 NOTE — DISCHARGE SUMMARY
Hospital Medicine Discharge Summary    Patient ID: [facility]      Patient's PCP: Patrick Aparicio MD    Admit Date: 10/22/2022     Discharge Date: 10/24/2022      Admitting Provider: Elsa Baez MD     Discharge Provider: CARMELITA Chinchilla - CNP     Discharge Diagnoses: Active Hospital Problems    Diagnosis     Priapism [N48.30]      Priority: Medium       The patient was seen and examined on day of discharge and this discharge summary is in conjunction with any daily progress note from day of discharge. Hospital Course:     20 y.o. male who presents via private car for priapism. Onset was this morning approximately 5 AM. Duration has been since the onset. Context includes patient presents today for evaluation of priapism since approximately 5 AM.  Patient does have history of sickle cell and recurrent priapism most recently seen in August for this. He denies any current chest pain, shortness of breath, recent illness including fevers, cough or congestion. He denies any swelling in his lower extremities or palpitations. He denies any abdominal pain, nausea, vomiting, diarrhea or constipation. He denies any urinary symptoms including dysuria, urgency, frequency, hematuria or flank pain, he states his last urine void was approximately 6 AM without difficulty. He denies any pain to his testicles. Quality is dull and aching with radiation to his penis. Alleviating factors include nothing. Aggravating factors include nothing. Pain is 10/10. Nothing has been used for pain today. Chart review reveals pt has significant PMHx of DVT, accidental overdose, asthma, enlarged heart, priapism due to sickle cell, sickle cell anemia. They take morphine, oxycodone, patient is not currently taking blood thinners. Priapism: Due to sickle crisis. improved with penile aspiration in ED.    Sickle cell crisis  Asthma  Right lower  extremity DVT history     PLAN:     Aggressive IV hydration   1 U PRBC  Monitor CBC, reticulocyte count  Continue folic acid  Continue hydroxyurea  Obtain urology consult   Obtain Hematology consult. Recommendations appreciated. Albuterol ipratropium prn   Will restart eliquis one anemia improves      DC plan:Sildenafil 20mg po qd  Sudafed OTC prn for nighttime erections. Ice packs, cold shower discussed,Pain medicine per Oncology    Return for any concerns    Physical Exam Performed:     /79   Pulse 72   Temp 97.9 °F (36.6 °C) (Oral)   Resp 18   Ht 5' 9\" (1.753 m)   Wt 199 lb 8.3 oz (90.5 kg)   SpO2 92%   BMI 29.46 kg/m²       General appearance:  No apparent distress, appears stated age and cooperative. HEENT:  Normal cephalic, atraumatic without obvious deformity. Pupils equal, round, and reactive to light. Extra ocular muscles intact. Conjunctivae/corneas clear. Neck: Supple, with full range of motion. No jugular venous distention. Trachea midline. Respiratory:  Normal respiratory effort. Clear to auscultation, bilaterally without Rales/Wheezes/Rhonchi. Cardiovascular:  Regular rate and rhythm with normal S1/S2 without murmurs, rubs or gallops. Abdomen: Soft, non-tender, non-distended with normal bowel sounds. Musculoskeletal:  No clubbing, cyanosis or edema bilaterally. Full range of motion without deformity. Skin: Skin color, texture, turgor normal.  No rashes or lesions. Neurologic:  Neurovascularly intact without any focal sensory/motor deficits. Cranial nerves: II-XII intact, grossly non-focal.  Psychiatric:  Alert and oriented, thought content appropriate, normal insight  Capillary Refill: Brisk,< 3 seconds   Peripheral Pulses: +2 palpable, equal bilaterally       Labs:  For convenience and continuity at follow-up the following most recent labs are provided:      CBC:    Lab Results   Component Value Date/Time    WBC 18.9 10/30/2022 09:08 AM    HGB 7.7 10/30/2022 09:08 AM    HCT 21.2 10/30/2022 09:08 AM     10/30/2022 09:08 AM       Renal: Lab Results   Component Value Date/Time     10/30/2022 09:08 AM    K 3.6 10/30/2022 09:08 AM     10/30/2022 09:08 AM    CO2 22 10/30/2022 09:08 AM    BUN 12 10/30/2022 09:08 AM    CREATININE 0.9 10/30/2022 09:08 AM    CALCIUM 9.2 10/30/2022 09:08 AM    PHOS 4.5 10/20/2021 06:39 AM         Significant Diagnostic Studies    Radiology:   XR CHEST (2 VW)   Final Result   No acute cardiopulmonary disease. Consults:     IP CONSULT TO UROLOGY  IP CONSULT TO HEM/ONC    Disposition:  Home    Condition at Discharge: Stable    Discharge Instructions/Follow-up:  See AVS    Code Status:  Prior     Activity: activity as tolerated    Diet: regular diet      Discharge Medications:     Discharge Medication List as of 10/24/2022 11:38 AM             Details   sildenafil (REVATIO) 20 MG tablet Take 1 tablet by mouth daily, Disp-30 tablet, R-0Normal                Details   DULoxetine (CYMBALTA) 30 MG extended release capsule Historical Med      apixaban (ELIQUIS) 5 MG TABS tablet Take 1 tablet by mouth 2 times daily, Disp-60 tablet, R-0Normal      pantoprazole (PROTONIX) 40 MG tablet Take 1 tablet by mouth every morning (before breakfast), Disp-30 tablet, [id number]      folic acid (FOLVITE) 1 MG tablet Take 2 tablets by mouth daily, Disp-30 tablet, R-3Normal      oxyCODONE HCl (OXY-IR) 10 MG immediate release tablet Take 10 mg by mouth every 4 hours as needed for Pain. Historical Med      albuterol sulfate HFA (PROVENTIL;VENTOLIN;PROAIR) 108 (90 Base) MCG/ACT inhaler Albuterol HFA Inhaler 90 mcg/actuation  inhaled  2 puffs prn     ActiveHistorical Med      levalbuterol (XOPENEX) 0.31 MG/3ML nebulization Levalbuterol Nebulized    2 puffs prn     ActiveHistorical Med      docusate (COLACE, DULCOLAX) 100 MG CAPS Take 100 mg by mouthHistorical Med      ibuprofen (ADVIL;MOTRIN) 800 MG tablet ibuprofen 800 MG Oral Tablet  oral   prn    ActiveHistorical Med      hydroxyurea (HYDREA) 500 MG chemo capsule Take 1,000 mg by mouth daily Historical Med             Time Spent on discharge is more than 30 minutes in the examination, evaluation, counseling and review of medications and discharge plan. Signed:    Olga Sanchez, APRN - CNP   11/1/2022      Thank you Cindy Yeh MD for the opportunity to be involved in this patient's care. If you have any questions or concerns, please feel free to contact me at [phone].

## 2022-10-24 NOTE — PROGRESS NOTES
Discharge instructions reviewed with patient at bedside. New prescription sent to outpatient pharmacyBridget emphasized need to schedule follow up appointments with Oncology and Urology. Pt verbalized understanding. All belongings gathered.

## 2022-10-30 ENCOUNTER — HOSPITAL ENCOUNTER (EMERGENCY)
Age: 22
Discharge: HOME | End: 2022-10-30
Attending: EMERGENCY MEDICINE
Payer: COMMERCIAL

## 2022-10-30 VITALS
BODY MASS INDEX: 29.55 KG/M2 | OXYGEN SATURATION: 94 % | HEIGHT: 69 IN | HEART RATE: 70 BPM | SYSTOLIC BLOOD PRESSURE: 104 MMHG | DIASTOLIC BLOOD PRESSURE: 56 MMHG | RESPIRATION RATE: 18 BRPM | TEMPERATURE: 98.1 F | WEIGHT: 199.5 LBS

## 2022-10-30 DIAGNOSIS — N48.32 PRIAPISM DUE TO SICKLE CELL DISEASE (HCC): Primary | ICD-10-CM

## 2022-10-30 DIAGNOSIS — D57.1 PRIAPISM DUE TO SICKLE CELL DISEASE (HCC): Primary | ICD-10-CM

## 2022-10-30 LAB
A/G RATIO: 1.6 (ref 1.1–2.2)
ALBUMIN SERPL-MCNC: 4.5 G/DL (ref 3.4–5)
ALP BLD-CCNC: 82 U/L (ref 40–129)
ALT SERPL-CCNC: 24 U/L (ref 10–40)
ANION GAP SERPL CALCULATED.3IONS-SCNC: 12 MMOL/L (ref 3–16)
AST SERPL-CCNC: 42 U/L (ref 15–37)
ATYPICAL LYMPHOCYTE RELATIVE PERCENT: 3 % (ref 0–6)
BASOPHILS ABSOLUTE: 0 K/UL (ref 0–0.2)
BASOPHILS RELATIVE PERCENT: 0 %
BILIRUB SERPL-MCNC: 7.7 MG/DL (ref 0–1)
BUN BLDV-MCNC: 12 MG/DL (ref 7–20)
CALCIUM SERPL-MCNC: 9.2 MG/DL (ref 8.3–10.6)
CHLORIDE BLD-SCNC: 101 MMOL/L (ref 99–110)
CO2: 22 MMOL/L (ref 21–32)
CREAT SERPL-MCNC: 0.9 MG/DL (ref 0.9–1.3)
EOSINOPHILS ABSOLUTE: 0.2 K/UL (ref 0–0.6)
EOSINOPHILS RELATIVE PERCENT: 1 %
GFR SERPL CREATININE-BSD FRML MDRD: >60 ML/MIN/{1.73_M2}
GLUCOSE BLD-MCNC: 98 MG/DL (ref 70–99)
HCT VFR BLD CALC: 21.2 % (ref 40.5–52.5)
HEMOGLOBIN: 7.7 G/DL (ref 13.5–17.5)
LYMPHOCYTES ABSOLUTE: 4.9 K/UL (ref 1–5.1)
LYMPHOCYTES RELATIVE PERCENT: 23 %
MCH RBC QN AUTO: 33.1 PG (ref 26–34)
MCHC RBC AUTO-ENTMCNC: 36.4 G/DL (ref 31–36)
MCV RBC AUTO: 90.9 FL (ref 80–100)
MONOCYTES ABSOLUTE: 1.5 K/UL (ref 0–1.3)
MONOCYTES RELATIVE PERCENT: 8 %
NEUTROPHILS ABSOLUTE: 12.3 K/UL (ref 1.7–7.7)
NEUTROPHILS RELATIVE PERCENT: 65 %
NUCLEATED RED BLOOD CELLS: 6 /100 WBC
OVALOCYTES: ABNORMAL
PDW BLD-RTO: 21.4 % (ref 12.4–15.4)
PLATELET # BLD: 359 K/UL (ref 135–450)
PLATELET SLIDE REVIEW: ADEQUATE
PMV BLD AUTO: 8.4 FL (ref 5–10.5)
POLYCHROMASIA: ABNORMAL
POTASSIUM REFLEX MAGNESIUM: 3.6 MMOL/L (ref 3.5–5.1)
RBC # BLD: 2.34 M/UL (ref 4.2–5.9)
SCHISTOCYTES: ABNORMAL
SICKLE CELLS: ABNORMAL
SLIDE REVIEW: ABNORMAL
SODIUM BLD-SCNC: 135 MMOL/L (ref 136–145)
TOTAL PROTEIN: 7.3 G/DL (ref 6.4–8.2)
WBC # BLD: 18.9 K/UL (ref 4–11)

## 2022-10-30 PROCEDURE — 2580000003 HC RX 258: Performed by: EMERGENCY MEDICINE

## 2022-10-30 PROCEDURE — A4216 STERILE WATER/SALINE, 10 ML: HCPCS | Performed by: EMERGENCY MEDICINE

## 2022-10-30 PROCEDURE — 6360000002 HC RX W HCPCS: Performed by: PHYSICIAN ASSISTANT

## 2022-10-30 PROCEDURE — 6360000002 HC RX W HCPCS: Performed by: EMERGENCY MEDICINE

## 2022-10-30 PROCEDURE — 96376 TX/PRO/DX INJ SAME DRUG ADON: CPT

## 2022-10-30 PROCEDURE — 80053 COMPREHEN METABOLIC PANEL: CPT

## 2022-10-30 PROCEDURE — 6370000000 HC RX 637 (ALT 250 FOR IP): Performed by: PHYSICIAN ASSISTANT

## 2022-10-30 PROCEDURE — 96374 THER/PROPH/DIAG INJ IV PUSH: CPT

## 2022-10-30 PROCEDURE — 99284 EMERGENCY DEPT VISIT MOD MDM: CPT

## 2022-10-30 PROCEDURE — 85025 COMPLETE CBC W/AUTO DIFF WBC: CPT

## 2022-10-30 RX ORDER — PHENYLEPHRINE HCL IN 0.9% NACL 1 MG/10 ML
1 SYRINGE (ML) INTRAVENOUS ONCE
Status: DISCONTINUED | OUTPATIENT
Start: 2022-10-30 | End: 2022-10-30 | Stop reason: SDUPTHER

## 2022-10-30 RX ORDER — CEPHALEXIN 500 MG/1
500 CAPSULE ORAL 4 TIMES DAILY
Qty: 20 CAPSULE | Refills: 0 | Status: SHIPPED | OUTPATIENT
Start: 2022-10-30 | End: 2022-11-04

## 2022-10-30 RX ORDER — OXYCODONE HYDROCHLORIDE 5 MG/1
10 TABLET ORAL ONCE
Status: COMPLETED | OUTPATIENT
Start: 2022-10-30 | End: 2022-10-30

## 2022-10-30 RX ADMIN — HYDROMORPHONE HYDROCHLORIDE 1 MG: 1 INJECTION, SOLUTION INTRAMUSCULAR; INTRAVENOUS; SUBCUTANEOUS at 10:14

## 2022-10-30 RX ADMIN — HYDROMORPHONE HYDROCHLORIDE 1 MG: 1 INJECTION, SOLUTION INTRAMUSCULAR; INTRAVENOUS; SUBCUTANEOUS at 09:18

## 2022-10-30 RX ADMIN — OXYCODONE HYDROCHLORIDE 10 MG: 5 TABLET ORAL at 13:32

## 2022-10-30 RX ADMIN — PHENYLEPHRINE HYDROCHLORIDE: 10 INJECTION INTRAVENOUS at 10:14

## 2022-10-30 ASSESSMENT — PAIN SCALES - GENERAL
PAINLEVEL_OUTOF10: 9
PAINLEVEL_OUTOF10: 9
PAINLEVEL_OUTOF10: 10

## 2022-10-30 ASSESSMENT — ENCOUNTER SYMPTOMS
COUGH: 0
SORE THROAT: 0
SHORTNESS OF BREATH: 0
ABDOMINAL PAIN: 0
VOMITING: 0
NAUSEA: 0
DIARRHEA: 0
BACK PAIN: 0
CHEST TIGHTNESS: 0

## 2022-10-30 ASSESSMENT — PAIN - FUNCTIONAL ASSESSMENT: PAIN_FUNCTIONAL_ASSESSMENT: 0-10

## 2022-10-30 ASSESSMENT — PAIN DESCRIPTION - LOCATION: LOCATION: PENIS

## 2022-10-30 NOTE — CONSULTS
[facility]  Urology @1558  Per Arnold LUA. Re; priapism in sickle cell patient. Dr. Manolo Moreno responded @7585.

## 2022-10-30 NOTE — ED PROVIDER NOTES
I independently examined and evaluated Dean Lowe. All diagnostic, treatment, and disposition decisions were made by myself in conjunction with the SHONNA, Andrew Salt. for all further details of the patient's emergency department visit, please see their documentation. 44-year-old male with a history of sickle cell anemia and recurrent priapism presents with priapism. He woke up with it this morning. He has had this recurrently in the past and has been noncompliant with his urology follow-up. He also reports pain in bilateral hips. No chest pain or shortness of breath. No fever. Vitals:    10/30/22 1331   BP: (!) 104/56   Pulse: 70   Resp: 18   Temp:    SpO2: 94%     Patient is crying in pain. Heart is regular rate and rhythm, no respiratory distress. He has an erect penis that is acutely painful.     Physical exam was done with the SHONNA Andrew Mcintosh in the room at the time  Results for orders placed or performed during the hospital encounter of 10/30/22   CBC with Auto Differential   Result Value Ref Range    WBC 18.9 (H) 4.0 - 11.0 K/uL    RBC 2.34 (L) 4.20 - 5.90 M/uL    Hemoglobin 7.7 (L) 13.5 - 17.5 g/dL    Hematocrit 21.2 (L) 40.5 - 52.5 %    MCV 90.9 80.0 - 100.0 fL    MCH 33.1 26.0 - 34.0 pg    MCHC 36.4 (H) 31.0 - 36.0 g/dL    RDW 21.4 (H) 12.4 - 15.4 %    Platelets 401 113 - 932 K/uL    MPV 8.4 5.0 - 10.5 fL    PLATELET SLIDE REVIEW Adequate     SLIDE REVIEW see below     Neutrophils % 65.0 %    Lymphocytes % 23.0 %    Monocytes % 8.0 %    Eosinophils % 1.0 %    Basophils % 0.0 %    Neutrophils Absolute 12.3 (H) 1.7 - 7.7 K/uL    Lymphocytes Absolute 4.9 1.0 - 5.1 K/uL    Monocytes Absolute 1.5 (H) 0.0 - 1.3 K/uL    Eosinophils Absolute 0.2 0.0 - 0.6 K/uL    Basophils Absolute 0.0 0.0 - 0.2 K/uL    Atypical Lymphocytes Relative 3 0 - 6 %    nRBC 6 (A) /100 WBC    Polychromasia Occasional (A)     Schistocytes Occasional (A)     Ovalocytes 2+ (A)     Sickle Cells 3+ (A)    CMP w/ Reflex to MG   Result Value Ref Range    Sodium 135 (L) 136 - 145 mmol/L    Potassium reflex Magnesium 3.6 3.5 - 5.1 mmol/L    Chloride 101 99 - 110 mmol/L    CO2 22 21 - 32 mmol/L    Anion Gap 12 3 - 16    Glucose 98 70 - 99 mg/dL    BUN 12 7 - 20 mg/dL    Creatinine 0.9 0.9 - 1.3 mg/dL    Est, Glom Filt Rate >60 >60    Calcium 9.2 8.3 - 10.6 mg/dL    Total Protein 7.3 6.4 - 8.2 g/dL    Albumin 4.5 3.4 - 5.0 g/dL    Albumin/Globulin Ratio 1.6 1.1 - 2.2    Total Bilirubin 7.7 (H) 0.0 - 1.0 mg/dL    Alkaline Phosphatase 82 40 - 129 U/L    ALT 24 10 - 40 U/L    AST 42 (H) 15 - 37 U/L       No orders to display               I personally saw and performed a substantive portion of the visit including all aspects of the medical decision making. MDM:          The patient presents with pain likely related to his sickle cell pain crisis and priapism. Under sterile technique we attempted to drain the priapism. Performed a dorsal penile block injecting 5 cc of lidocaine 1% without epinephrine. We were able to get 120 cc of dark red blood out of the left side of the corpus cavernosum. We then injected 2 mL of phenylephrine 100 mcg/mL. He had very slight detumescence of the penis but not complete. He was still having pain. We consulted urology who evaluated the patient in the ER. They feel given that he is no longer rigid that he is appropriate for ice packs and antibiotics at home with follow-up as an outpatient. We stressed to the patient the importance of follow-up. This has been a recurrent issue for him and he has been noncompliant with his outpatient follow-up. Lab work appears to be at baseline today.   He will be discharged home        Casey Martinez MD  10/30/22 [id number]

## 2022-10-30 NOTE — ED PROVIDER NOTES
**ADVANCED PRACTICE PROVIDER, I HAVE EVALUATED THIS [facility]  ED  EMERGENCY DEPARTMENT ENCOUNTER      Pt Name: Silvia Menezes  FML:[id number]  [address] 2000  Date of evaluation: 10/30/2022  Provider: STONEY Odell      Chief Complaint:    Chief Complaint   Patient presents with    Penis Pain     Pt with hx of sickle cell crisis, comes today with priapism that started this morning,          Nursing Notes, Past Medical Hx, Past Surgical Hx, Social Hx, Allergies, and Family Hx were all reviewed and agreed with or any disagreements were addressed in the HPI.    HPI: (Location, Duration, Timing, Severity, Quality, Assoc Sx, Context, Modifying factors)    Chief Complaint of priaprism. This is a  [address] [facility] [address] [facility] [address] y.o. male who presents via private vehicle complaining of priaprism. The patient states has past medical history of sickle cell and was recently seen here for crisis with priaprism. He states his symptoms began when he woke up this morning. He is applying ice packs and currently. Most recently he was seen here 8 days ago and actually admitted. At that time they were able to drain a total of close to 60 cc of fluid to drain the priaprism. He was ultimately admitted admitted to the hospital due to needing a blood transfusion. He was evaluated by urology and hematology while in the hospital.  The patient states he was advised to follow-up with Dr. Rikki Mera but he has not made any appointments. Currently rates pain as 9/10. He is unsure what time he woke up this morning. He states he has not eaten anything since 11 PM last night.     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)     Sickle cell anemia (HCC)          Procedure Laterality Date    SPLENECTOMY         Medications:  Discharge Medication List as of 10/30/2022  1:34 PM        CONTINUE these medications which have NOT CHANGED    Details   sildenafil (REVATIO) 20 MG tablet Take 1 tablet by mouth daily, Disp-30 tablet, R-0Normal      DULoxetine (CYMBALTA) 30 MG extended release capsule Historical Med      apixaban (ELIQUIS) 5 MG TABS tablet Take 1 tablet by mouth 2 times daily, Disp-60 tablet, R-0Normal      pantoprazole (PROTONIX) 40 MG tablet Take 1 tablet by mouth every morning (before breakfast), Disp-30 tablet, [id number]      folic acid (FOLVITE) 1 MG tablet Take 2 tablets by mouth daily, Disp-30 tablet, R-3Normal      oxyCODONE HCl (OXY-IR) 10 MG immediate release tablet Take 10 mg by mouth every 4 hours as needed for Pain. Historical Med      albuterol sulfate HFA (PROVENTIL;VENTOLIN;PROAIR) 108 (90 Base) MCG/ACT inhaler Albuterol HFA Inhaler 90 mcg/actuation  inhaled  2 puffs prn     ActiveHistorical Med      levalbuterol (XOPENEX) 0.31 MG/3ML nebulization Levalbuterol Nebulized    2 puffs prn     ActiveHistorical Med      docusate (COLACE, DULCOLAX) 100 MG CAPS Take 100 mg by mouthHistorical Med      ibuprofen (ADVIL;MOTRIN) 800 MG tablet ibuprofen 800 MG Oral Tablet  oral   prn    ActiveHistorical Med               Review of Systems:  (2-9 systems needed)  Review of Systems   Constitutional:  Negative for chills and fever. HENT:  Negative for congestion, nosebleeds and sore throat. Eyes:  Negative for visual disturbance. Respiratory:  Negative for cough, chest tightness and shortness of breath. Cardiovascular:  Negative for chest pain and palpitations. Gastrointestinal:  Negative for abdominal pain, diarrhea, nausea and vomiting. Genitourinary:  Positive for penile pain and penile swelling. Negative for dysuria, frequency, hematuria and urgency. Priapism   Musculoskeletal:  Negative for back pain and neck pain. Hip pain   Skin:  Negative for rash.    Neurological:  Negative for dizziness, weakness, light-headedness and headaches. \"Positives and Pertinent negatives as per HPI\"    Physical Exam:  Physical Exam  Vitals and nursing note reviewed. Constitutional:       Appearance: Normal appearance. He is not diaphoretic. HENT:      Head: Normocephalic and atraumatic. Nose: Nose normal.      Mouth/Throat:      Mouth: Mucous membranes are moist.   Eyes:      General:         Right eye: No discharge. Left eye: No discharge. Extraocular Movements: Extraocular movements intact. Pulmonary:      Effort: Pulmonary effort is normal. No respiratory distress. Musculoskeletal:         General: Normal range of motion. Cervical back: Normal range of motion and neck supple. Skin:     General: Skin is warm and dry. Coloration: Skin is not pale. Neurological:      Mental Status: He is alert and oriented to person, place, and time.    Psychiatric:         Mood and Affect: Mood normal.         Behavior: Behavior normal.       MEDICAL DECISION MAKING    Vitals:    Vitals:    10/30/22 1108 10/30/22 1209 10/30/22 1238 10/30/22 1331   BP: (!) 109/52 (!) 110/56 (!) 118/57 (!) 104/56   Pulse: 73  69 70   Resp: 16  16 18   Temp:       SpO2: 98% 98% 99% 94%   Weight:       Height:           LABS:  Labs Reviewed   CBC WITH AUTO DIFFERENTIAL - Abnormal; Notable for the following components:       Result Value    WBC 18.9 (*)     RBC 2.34 (*)     Hemoglobin 7.7 (*)     Hematocrit 21.2 (*)     MCHC 36.4 (*)     RDW 21.4 (*)     Neutrophils Absolute 12.3 (*)     Monocytes Absolute 1.5 (*)     nRBC 6 (*)     Polychromasia Occasional (*)     Schistocytes Occasional (*)     Ovalocytes 2+ (*)     Sickle Cells 3+ (*)     All other components within normal limits   COMPREHENSIVE METABOLIC PANEL W/ REFLEX TO MG FOR LOW K - Abnormal; Notable for the following components:    Sodium 135 (*)     Total Bilirubin 7.7 (*)     AST 42 (*)     All other components within normal limits        Remainder of labs reviewed and were negative at this time or not returned at the time of this note. RADIOLOGY:   Non-plain film images such as CT, Ultrasound and MRI are read by the radiologist. Merton Litten, PA have directly visualized the radiologic plain film image(s) with the below findings:      Interpretation per the Radiologist below, if available at the time of this note:    No orders to display        XR CHEST (2 VW)    Result Date: 10/22/2022  EXAMINATION: TWO XRAY VIEWS OF THE CHEST 10/22/2022 12:01 pm COMPARISON: CTA PA, 08/27/2022 HISTORY: ORDERING SYSTEM PROVIDED HISTORY: elevated wbc hx scd TECHNOLOGIST PROVIDED HISTORY: Reason for exam:->elevated wbc hx scd Reason for Exam: elevated wbc hx scd FINDINGS: The cardiac silhouette, mediastinal and hilar contours are normal.  The lungs are clear. There are no pleural effusions. No acute osseous abnormalities are identified. No acute cardiopulmonary disease. MEDICAL DECISION MAKING / ED COURSE:      Is this patient to be included in the SEP-1 Core Measure due to severe sepsis or septic shock? No   Exclusion criteria - the patient is NOT to be included for SEP-1 Core Measure due to: Alternative explanation for abnormal labs/vitals that do not relate to sepsis, see MDM for further explanation      PROCEDURES:   Procedures    None    Patient was given:  Medications   HYDROmorphone (DILAUDID) injection 1 mg (1 mg IntraVENous Given 10/30/22 0918)   phenylephrine (STEVE-SYNEPHRINE) 5 mg in sodium chloride (PF) 0.9 % 10 mL syringe ( IntraCAVernosal Given 10/30/22 1014)   HYDROmorphone (DILAUDID) injection 1 mg (1 mg IntraVENous Given 10/30/22 1014)   oxyCODONE (ROXICODONE) immediate release tablet 10 mg (10 mg Oral Given 10/30/22 1332)       Patient was evaluated in the emergency department today for priapism.   Per chart review he was most recently seen in the emergency department about 8 days ago for the same complaint however at that time he was found to be anemic and admitted to the [facility] for blood transfusion. Using sterile technique we did attempt to drain the priapism. A dorsal penile block was performed by attending physician. Approximately 120 cc of dark red blood was drained out of the left side of the corpus cavernosum. 2mL phenylephrine 100 mcg/mL was injected at the site of drainage. The patient did have detumescence of penis but it was not complete. He was still having significant pain despite procedure and multiple doses of Dilaudid therefore urology was consulted. I spoke with Dr. Raymond Meade who did not feel the patient required surgery at this time but advised ice, short course of antibiotics and ultimately follow-up. We have stressed the importance of follow-up with Dr. Terrence Johnson for long-term care to prevent these episodes. The patient historically has not been reliable to schedule follow-up. I did have a lengthy discussion with the patient regarding the importance of follow-up. I also gave him his home dose of oxycodone in the emergency department. He is complaining of bilateral hip pain I did offer to perform an x-ray however he states this is his typical sickle cell pain and does not want any imaging at this time. We did perform basic lab work, he was found to have leukocytosis consistent with sickle cell flare. His hemoglobin today is stable at 7.7. CMP without acute electrolyte abnormality maintain renal function. The patient will be discharged home on short course of Keflex. I again reiterated that he is really needed to follow-up with urology for definitive management of recurring priapism. He will also follow-up with his hematologist for sickle cell crisis. Patient is in agreement treatment plan. All questions are answered and he is discharged home in good condition. The patient tolerated their visit well. I evaluated the patient. The physician was available for consultation as needed.   The patient and / or the family were informed of the results of any tests, a time was given to answer questions, a plan was proposed and they agreed with plan. I am the Primary Clinician of Record. CLINICAL IMPRESSION:  1.  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 ([address]        DISPOSITION Decision To Discharge 10/30/2022 01:19:20 PM      PATIENT REFERRED TO:  Stanford Shipman MD  [address]  [phone]    Schedule an appointment as soon as possible for a visit       [address] [address] [phone]  Go to   If symptoms worsen    DISCHARGE MEDICATIONS:  Discharge Medication List as of 10/30/2022  1:34 PM        START taking these medications    Details   cephALEXin (KEFLEX) 500 MG capsule Take 1 capsule by mouth 4 times daily for 5 days, Disp-20 capsule, R-0Normal             DISCONTINUED MEDICATIONS:  Discharge Medication List as of 10/30/2022  1:34 PM                 (Please note the MDM and HPI sections of this note were completed with a voice recognition program.  Efforts were made to edit the dictations but occasionally words are mis-transcribed.)    Electronically signed, STONEY Garrison,          Guillermo Anderson, [address]  10/30/22 1913

## 2022-10-30 NOTE — CONSULTS
[facility]    Reason for Consult:  Priapism  Requesting Physician:  ER      History Obtained From:  patient, electronic medical record    HISTORY OF PRESENT ILLNESS:                The patient is a 20 y.o. male who presents with priapism. He was seen and examined at 11:00am.  He has had multiple episodes related to sickle cell disease. The ER has already irrigated the corporal bodies with phenylephrine and saline. 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 Allergies: Allergies   Allergen Reactions    Morphine Shortness Of Breath     Other reaction(s): Histamine-like Reaction  Has asthma exacerbation with morphine-histamine type reaction       Social History:  Reviewed, non contributory    Family History:  Reviewed, non contributory      REVIEW OF SYSTEMS:    12 point ROS has been completed and reviwed    PHYSICAL EXAM:    VITALS:  BP (!) 118/57   Pulse 73   Temp 98.1 °F (36.7 °C)   Resp 16   Ht 5' 9\" (1.753 m)   Wt 199 lb 8 oz (90.5 kg)   SpO2 99%   BMI 29.46 kg/m²   H&N: Sclera normal, no masses, trachea midline, no bruit  CVS: Normal rate and rhythm, no murmurs or rubs, peripheral pulses equal, no clubbing or cyanosis. RESP: Breath sounds equal bilateral, few rhonchi. ABDO: Soft, non-tender, bowel sounds active, no organomegaly, no hernias. LYMPH:  No lymphadenopathy. Skin: Warm dry and intact. : No CVAT, normal external genitalia, penis is tender, some enlargement but no rigidity, priapism appears to be resolving, no discharge.   MSK: Grossly normal for patient  CAROLYNN: Grossly normal for patient  PSY: No acute changes noted in psychosocial assessment. DATA:    CBC:   Lab Results   Component Value Date/Time    WBC 18.9 10/30/2022 09:08 AM    RBC 2.34 10/30/2022 09:08 AM    HGB 7.7 10/30/2022 09:08 AM    HCT 21.2 10/30/2022 09:08 AM    MCV 90.9 10/30/2022 09:08 AM    MCH 33.1 10/30/2022 09:08 AM    MCHC 36.4 10/30/2022 09:08 AM    RDW 21.4 10/30/2022 09:08 AM     10/30/2022 09:08 AM    MPV 8.4 10/30/2022 09:08 AM     BMP:    Lab Results   Component Value Date/Time     10/30/2022 09:08 AM    K 3.6 10/30/2022 09:08 AM     10/30/2022 09:08 AM    CO2 22 10/30/2022 09:08 AM    BUN 12 10/30/2022 09:08 AM    LABALBU 4.5 10/30/2022 09:08 AM    CREATININE 0.9 10/30/2022 09:08 AM    CALCIUM 9.2 10/30/2022 09:08 AM    GFRAA >60 08/29/2022 02:12 AM    LABGLOM >60 10/30/2022 09:08 AM    GLUCOSE 98 10/30/2022 09:08 AM     U/A:    Lab Results   Component Value Date/Time    COLORU SADE 10/22/2022 10:58 AM    PROTEINU 30 10/22/2022 10:58 AM    PHUR 6.5 10/22/2022 10:58 AM    WBCUA 0-2 10/22/2022 10:58 AM    RBCUA 0-2 10/22/2022 10:58 AM    MUCUS 1+ 05/27/2022 07:00 AM    BACTERIA 3+ 10/22/2022 10:58 AM    CLARITYU Clear 10/22/2022 10:58 AM    SPECGRAV 1.015 10/22/2022 10:58 AM    LEUKOCYTESUR Negative 10/22/2022 10:58 AM    UROBILINOGEN 1.0 10/22/2022 10:58 AM    BILIRUBINUR Negative 10/22/2022 10:58 AM    BLOODU MODERATE 10/22/2022 10:58 AM    GLUCOSEU Negative 10/22/2022 10:58 AM    AMORPHOUS Rare 03/04/2022 07:53 PM       IMPRESSION/RECOMMENDATIONS:      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 Acute crisis has resolved with injection therapy in the ER. I do not feel surgical irrigation and/or exploration is required at this time. In the past he has been asked to see Dr Brendon Curiel for his opinion concerning long term care and therapy. I emphasized with him the need for f/u in the office to try and prevent these crisis situations.   He is agreeable to f/u in the office. Thank you for asking me to see this interesting patient.     Reina Whitlock MD

## 2022-10-30 NOTE — ED NOTES
Pt SPO2 83-85% on RA. Pt placed on 4 L O2 via nc.  SPO2 up to 91%     Altaf Almodovar RN  10/30/22 9987

## 2022-11-13 ENCOUNTER — HOSPITAL ENCOUNTER (EMERGENCY)
Age: 22
Discharge: HOME | End: 2022-11-14
Attending: EMERGENCY MEDICINE
Payer: COMMERCIAL

## 2022-11-13 DIAGNOSIS — D57.00 SICKLE CELL PAIN CRISIS (HCC): Primary | ICD-10-CM

## 2022-11-13 LAB
ACANTHOCYTES: ABNORMAL
ANION GAP SERPL CALCULATED.3IONS-SCNC: 9 MMOL/L (ref 3–16)
ANISOCYTOSIS: ABNORMAL
BASOPHILS ABSOLUTE: 0 K/UL (ref 0–0.2)
BASOPHILS RELATIVE PERCENT: 0 %
BUN BLDV-MCNC: 10 MG/DL (ref 7–20)
CALCIUM SERPL-MCNC: 9.4 MG/DL (ref 8.3–10.6)
CHLORIDE BLD-SCNC: 107 MMOL/L (ref 99–110)
CO2: 24 MMOL/L (ref 21–32)
CREAT SERPL-MCNC: 0.9 MG/DL (ref 0.9–1.3)
EOSINOPHILS ABSOLUTE: 0.2 K/UL (ref 0–0.6)
EOSINOPHILS RELATIVE PERCENT: 1 %
GFR SERPL CREATININE-BSD FRML MDRD: >60 ML/MIN/{1.73_M2}
GLUCOSE BLD-MCNC: 90 MG/DL (ref 70–99)
HCT VFR BLD CALC: 24.7 % (ref 40.5–52.5)
HEMOGLOBIN: 8.3 G/DL (ref 13.5–17.5)
IMMATURE RETIC FRACT: 0.61 (ref 0.21–0.37)
LYMPHOCYTES ABSOLUTE: 3.4 K/UL (ref 1–5.1)
LYMPHOCYTES RELATIVE PERCENT: 18 %
MACROCYTES: ABNORMAL
MCH RBC QN AUTO: 31.9 PG (ref 26–34)
MCHC RBC AUTO-ENTMCNC: 33.6 G/DL (ref 31–36)
MCV RBC AUTO: 94.9 FL (ref 80–100)
MONOCYTES ABSOLUTE: 1.7 K/UL (ref 0–1.3)
MONOCYTES RELATIVE PERCENT: 9 %
NEUTROPHILS ABSOLUTE: 13.6 K/UL (ref 1.7–7.7)
NEUTROPHILS RELATIVE PERCENT: 72 %
OVALOCYTES: ABNORMAL
PDW BLD-RTO: 21.9 % (ref 12.4–15.4)
PLATELET # BLD: 568 K/UL (ref 135–450)
PLATELET SLIDE REVIEW: ABNORMAL
PMV BLD AUTO: 8.4 FL (ref 5–10.5)
POIKILOCYTES: ABNORMAL
POLYCHROMASIA: ABNORMAL
POTASSIUM REFLEX MAGNESIUM: 4.6 MMOL/L (ref 3.5–5.1)
RBC # BLD: 2.6 M/UL (ref 4.2–5.9)
RETICULOCYTE ABSOLUTE COUNT: 0.35 M/UL
RETICULOCYTE COUNT PCT: 13.48 % (ref 0.5–2.18)
SICKLE CELLS: ABNORMAL
SODIUM BLD-SCNC: 140 MMOL/L (ref 136–145)
STOMATOCYTES: ABNORMAL
TARGET CELLS: ABNORMAL
WBC # BLD: 18.9 K/UL (ref 4–11)

## 2022-11-13 PROCEDURE — 2580000003 HC RX 258: Performed by: EMERGENCY MEDICINE

## 2022-11-13 PROCEDURE — 85025 COMPLETE CBC W/AUTO DIFF WBC: CPT

## 2022-11-13 PROCEDURE — 6360000002 HC RX W HCPCS: Performed by: EMERGENCY MEDICINE

## 2022-11-13 PROCEDURE — 85045 AUTOMATED RETICULOCYTE COUNT: CPT

## 2022-11-13 PROCEDURE — 96375 TX/PRO/DX INJ NEW DRUG ADDON: CPT

## 2022-11-13 PROCEDURE — 99284 EMERGENCY DEPT VISIT MOD MDM: CPT

## 2022-11-13 PROCEDURE — 80048 BASIC METABOLIC PNL TOTAL CA: CPT

## 2022-11-13 PROCEDURE — 96374 THER/PROPH/DIAG INJ IV PUSH: CPT

## 2022-11-13 RX ORDER — KETOROLAC TROMETHAMINE 30 MG/ML
30 INJECTION, SOLUTION INTRAMUSCULAR; INTRAVENOUS ONCE
Status: COMPLETED | OUTPATIENT
Start: 2022-11-13 | End: 2022-11-13

## 2022-11-13 RX ORDER — 0.9 % SODIUM CHLORIDE 0.9 %
1000 INTRAVENOUS SOLUTION INTRAVENOUS ONCE
Status: COMPLETED | OUTPATIENT
Start: 2022-11-13 | End: 2022-11-14

## 2022-11-13 RX ADMIN — HYDROMORPHONE HYDROCHLORIDE 1 MG: 1 INJECTION, SOLUTION INTRAMUSCULAR; INTRAVENOUS; SUBCUTANEOUS at 21:54

## 2022-11-13 RX ADMIN — KETOROLAC TROMETHAMINE 30 MG: 30 INJECTION, SOLUTION INTRAMUSCULAR; INTRAVENOUS at 21:54

## 2022-11-13 RX ADMIN — SODIUM CHLORIDE 1000 ML: 9 INJECTION, SOLUTION INTRAVENOUS at 21:54

## 2022-11-13 ASSESSMENT — PAIN DESCRIPTION - ORIENTATION: ORIENTATION: RIGHT

## 2022-11-13 ASSESSMENT — PAIN DESCRIPTION - DESCRIPTORS: DESCRIPTORS: ACHING

## 2022-11-13 ASSESSMENT — PAIN SCALES - GENERAL
PAINLEVEL_OUTOF10: 10
PAINLEVEL_OUTOF10: 8

## 2022-11-13 ASSESSMENT — PAIN - FUNCTIONAL ASSESSMENT: PAIN_FUNCTIONAL_ASSESSMENT: 0-10

## 2022-11-13 ASSESSMENT — PAIN DESCRIPTION - LOCATION: LOCATION: HIP

## 2022-11-14 VITALS
RESPIRATION RATE: 17 BRPM | HEART RATE: 74 BPM | WEIGHT: 190 LBS | TEMPERATURE: 98.7 F | DIASTOLIC BLOOD PRESSURE: 62 MMHG | SYSTOLIC BLOOD PRESSURE: 135 MMHG | OXYGEN SATURATION: 95 % | BODY MASS INDEX: 28.14 KG/M2 | HEIGHT: 69 IN

## 2022-11-14 VITALS
RESPIRATION RATE: 16 BRPM | SYSTOLIC BLOOD PRESSURE: 129 MMHG | OXYGEN SATURATION: 97 % | HEART RATE: 68 BPM | DIASTOLIC BLOOD PRESSURE: 73 MMHG | TEMPERATURE: 97.9 F

## 2022-11-14 DIAGNOSIS — D57.00 SICKLE CELL CRISIS (HCC): Primary | ICD-10-CM

## 2022-11-14 DIAGNOSIS — N48.32 PRIAPISM DUE TO SICKLE CELL DISEASE (HCC): ICD-10-CM

## 2022-11-14 DIAGNOSIS — D57.1 PRIAPISM DUE TO SICKLE CELL DISEASE (HCC): ICD-10-CM

## 2022-11-14 LAB
A/G RATIO: 1.6 (ref 1.1–2.2)
ALBUMIN SERPL-MCNC: 4.5 G/DL (ref 3.4–5)
ALP BLD-CCNC: 76 U/L (ref 40–129)
ALT SERPL-CCNC: 11 U/L (ref 10–40)
ANION GAP SERPL CALCULATED.3IONS-SCNC: 10 MMOL/L (ref 3–16)
AST SERPL-CCNC: 34 U/L (ref 15–37)
BANDED NEUTROPHILS RELATIVE PERCENT: 2 % (ref 0–7)
BASOPHILS ABSOLUTE: 0 K/UL (ref 0–0.2)
BASOPHILS RELATIVE PERCENT: 0 %
BILIRUB SERPL-MCNC: 6 MG/DL (ref 0–1)
BUN BLDV-MCNC: 10 MG/DL (ref 7–20)
CALCIUM SERPL-MCNC: 9.5 MG/DL (ref 8.3–10.6)
CHLORIDE BLD-SCNC: 106 MMOL/L (ref 99–110)
CO2: 23 MMOL/L (ref 21–32)
CREAT SERPL-MCNC: 0.6 MG/DL (ref 0.9–1.3)
EOSINOPHILS ABSOLUTE: 0.3 K/UL (ref 0–0.6)
EOSINOPHILS RELATIVE PERCENT: 2 %
GFR SERPL CREATININE-BSD FRML MDRD: >60 ML/MIN/{1.73_M2}
GLUCOSE BLD-MCNC: 95 MG/DL (ref 70–99)
HCT VFR BLD CALC: 23.1 % (ref 40.5–52.5)
HEMOGLOBIN: 8 G/DL (ref 13.5–17.5)
IMMATURE RETIC FRACT: 0.65 (ref 0.21–0.37)
LYMPHOCYTES ABSOLUTE: 2.9 K/UL (ref 1–5.1)
LYMPHOCYTES RELATIVE PERCENT: 18 %
MCH RBC QN AUTO: 32.2 PG (ref 26–34)
MCHC RBC AUTO-ENTMCNC: 34.6 G/DL (ref 31–36)
MCV RBC AUTO: 93 FL (ref 80–100)
MONOCYTES ABSOLUTE: 0.6 K/UL (ref 0–1.3)
MONOCYTES RELATIVE PERCENT: 4 %
NEUTROPHILS ABSOLUTE: 12.2 K/UL (ref 1.7–7.7)
NEUTROPHILS RELATIVE PERCENT: 74 %
NUCLEATED RED BLOOD CELLS: 3 /100 WBC
PDW BLD-RTO: 22.4 % (ref 12.4–15.4)
PLATELET # BLD: 543 K/UL (ref 135–450)
PLATELET SLIDE REVIEW: ABNORMAL
PMV BLD AUTO: 8.1 FL (ref 5–10.5)
POLYCHROMASIA: ABNORMAL
POTASSIUM SERPL-SCNC: 4 MMOL/L (ref 3.5–5.1)
RBC # BLD: 2.49 M/UL (ref 4.2–5.9)
RETICULOCYTE ABSOLUTE COUNT: 0.27 M/UL
RETICULOCYTE COUNT PCT: 10.96 % (ref 0.5–2.18)
SICKLE CELLS: ABNORMAL
SLIDE REVIEW: ABNORMAL
SODIUM BLD-SCNC: 139 MMOL/L (ref 136–145)
TOTAL PROTEIN: 7.3 G/DL (ref 6.4–8.2)
WBC # BLD: 16 K/UL (ref 4–11)

## 2022-11-14 PROCEDURE — 85025 COMPLETE CBC W/AUTO DIFF WBC: CPT

## 2022-11-14 PROCEDURE — 2580000003 HC RX 258

## 2022-11-14 PROCEDURE — 99284 EMERGENCY DEPT VISIT MOD MDM: CPT

## 2022-11-14 PROCEDURE — 6360000002 HC RX W HCPCS

## 2022-11-14 PROCEDURE — 96374 THER/PROPH/DIAG INJ IV PUSH: CPT

## 2022-11-14 PROCEDURE — 6370000000 HC RX 637 (ALT 250 FOR IP): Performed by: EMERGENCY MEDICINE

## 2022-11-14 PROCEDURE — 96375 TX/PRO/DX INJ NEW DRUG ADDON: CPT

## 2022-11-14 PROCEDURE — 85045 AUTOMATED RETICULOCYTE COUNT: CPT

## 2022-11-14 PROCEDURE — 80053 COMPREHEN METABOLIC PANEL: CPT

## 2022-11-14 RX ORDER — KETOROLAC TROMETHAMINE 30 MG/ML
30 INJECTION, SOLUTION INTRAMUSCULAR; INTRAVENOUS ONCE
Status: COMPLETED | OUTPATIENT
Start: 2022-11-14 | End: 2022-11-14

## 2022-11-14 RX ORDER — PSEUDOEPHEDRINE HCL 30 MG
60 TABLET ORAL ONCE
Status: COMPLETED | OUTPATIENT
Start: 2022-11-14 | End: 2022-11-14

## 2022-11-14 RX ORDER — KETOROLAC TROMETHAMINE 30 MG/ML
INJECTION, SOLUTION INTRAMUSCULAR; INTRAVENOUS
Status: COMPLETED
Start: 2022-11-14 | End: 2022-11-14

## 2022-11-14 RX ORDER — SODIUM CHLORIDE 9 MG/ML
1000 INJECTION, SOLUTION INTRAVENOUS CONTINUOUS
Status: DISCONTINUED | OUTPATIENT
Start: 2022-11-14 | End: 2022-11-14 | Stop reason: HOSPADM

## 2022-11-14 RX ADMIN — KETOROLAC TROMETHAMINE 30 MG: 30 INJECTION, SOLUTION INTRAMUSCULAR at 10:33

## 2022-11-14 RX ADMIN — PSEUDOEPHEDRINE HCL 60 MG: 30 TABLET, FILM COATED ORAL at 12:07

## 2022-11-14 RX ADMIN — KETOROLAC TROMETHAMINE 30 MG: 30 INJECTION, SOLUTION INTRAMUSCULAR; INTRAVENOUS at 10:33

## 2022-11-14 RX ADMIN — SODIUM CHLORIDE 1000 ML: 9 INJECTION, SOLUTION INTRAVENOUS at 10:32

## 2022-11-14 RX ADMIN — HYDROMORPHONE HYDROCHLORIDE 0.5 MG: 0.5 INJECTION, SOLUTION INTRAMUSCULAR; INTRAVENOUS; SUBCUTANEOUS at 10:34

## 2022-11-14 ASSESSMENT — PAIN DESCRIPTION - FREQUENCY: FREQUENCY: CONTINUOUS

## 2022-11-14 ASSESSMENT — PAIN DESCRIPTION - PAIN TYPE: TYPE: ACUTE PAIN

## 2022-11-14 ASSESSMENT — PAIN SCALES - GENERAL
PAINLEVEL_OUTOF10: 10

## 2022-11-14 ASSESSMENT — PAIN - FUNCTIONAL ASSESSMENT: PAIN_FUNCTIONAL_ASSESSMENT: 0-10

## 2022-11-14 NOTE — DISCHARGE INSTRUCTIONS
The blood work shows appropriate hemoglobin level and reticulocyte counts.   Please follow-up with your hematologist.  Return for worsening symptoms

## 2022-11-14 NOTE — ED PROVIDER NOTES
Emergency Department Provider Note  Location: [facility]  ED  11/14/2022     Patient Identification  Hans Green is a 20 y.o. male    Chief Complaint  Groin Pain (Patient was seen yesterday for sickle cell crisis, declined admission d/t getting pain meds today. Patient awoke with priapism this morning and increasing pain. ) and Sickle Cell Pain Crisis      HPI      Patient is a 20 y.o. male with a significant PMHx of sickle cell disease presents to the ED with complaints of sickle cell pain crisis and priapism. Patient complains of generalized pain, worse pain in right hip area and left shoulder. Patient woke up this morning with priapism. Patient was seen in the ED yesterday for similar complaints. Yesterday, patient received Toradol, Dilaudid and IV fluid. Patient denies any chest pain, shortness of breath, fever, chills, nausea, vomiting, diarrhea or other review of system symptoms. Patient is able to ambulate. I have reviewed the following nursing documentation:  Allergies: Allergies   Allergen Reactions    Morphine Shortness Of Breath     Other reaction(s): Histamine-like Reaction  Has asthma exacerbation with morphine-histamine type reaction         Past medical history:  has a past medical history of Accidental overdose, Asthma, DVT (deep venous thrombosis) ([facility] (10/19/2021), Enlarged heart, 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 ([facility] [address] and Sickle cell anemia ([facility] [address] Past surgical history:  has a past surgical history that includes Splenectomy. Home medications:   Prior to Admission medications    Medication Sig Start Date End Date Taking?  Authorizing Provider   sildenafil (REVATIO) 20 MG tablet Take 1 tablet by mouth daily 10/24/22 11/23/22  CARMELITA Huffman CNP   DULoxetine (CYMBALTA) 30 MG extended release capsule  5/10/22   Historical Provider, MD   apixaban (ELIQUIS) 5 MG TABS tablet Take 1 tablet by mouth 2 times daily  Patient not taking: Reported on 8/27/2022 12/31/21   Mariam Marcano MD   pantoprazole (PROTONIX) 40 MG tablet Take 1 tablet by mouth every morning (before breakfast) 1/1/22   Mariam Marcano MD   folic acid (FOLVITE) 1 MG tablet Take 2 tablets by mouth daily 9/16/21   CARMELITA Mckee - CNP   oxyCODONE HCl (OXY-IR) 10 MG immediate release tablet Take 10 mg by mouth every 4 hours as needed for Pain. Historical Provider, MD   albuterol sulfate HFA (PROVENTIL;VENTOLIN;PROAIR) 108 (90 Base) MCG/ACT inhaler Albuterol HFA Inhaler 90 mcg/actuation  inhaled  2 puffs prn     Active    Historical Provider, MD   levalbuterol (XOPENEX) 0.31 MG/3ML nebulization Levalbuterol Nebulized    2 puffs prn     Active    Historical Provider, MD   docusate (COLACE, DULCOLAX) 100 MG CAPS Take 100 mg by mouth  Patient not taking: Reported on 8/27/2022    Historical Provider, MD   ibuprofen (ADVIL;MOTRIN) 800 MG tablet ibuprofen 800 MG Oral Tablet  oral   prn    Active    Historical Provider, MD       Social history:  reports that he has never smoked. He has never used smokeless tobacco. He reports that he does not currently use alcohol. He reports that he does not use drugs. Family history:    Family History   Problem Relation Age of Onset    Other Father         sarcoidosis         ROS  Constitutional: Negative for activity change, appetite change, chills, fatigue and fever. HENT: Negative for congestion, drooling, ear pain, hearing loss, sinus pressure, sore throat and trouble swallowing. Eyes: Negative for pain, discharge, redness, itching and visual disturbance. Respiratory: Negative for apnea, choking, chest tightness, shortness of breath and wheezing. Cardiovascular: Negative for chest pain, palpitations and leg swelling. Gastrointestinal: Negative for abdominal distention, abdominal pain, blood in stool, constipation, nausea and vomiting. Endocrine: Negative for cold intolerance, heat intolerance and polydipsia. Genitourinary: + for priapism. Negative for dysuria, flank pain, frequency, genital sores, hematuria. Musculoskeletal: Positive for left shoulder pain, right hip pain. negative for back pain, gait problem, joint swelling, neck pain and neck stiffness. Skin: Negative for color change, rash and wound. Neurological: Negative for dizziness, syncope, light-headedness, numbness and headaches. Psychiatric/Behavioral: Negative for agitation, confusion, decreased concentration, dysphoric mood, hallucinations, sleep disturbance and suicidal ideas. The patient is not nervous/anxious and is not hyperactive. Exam  Vitals:    11/14/22 0838 11/14/22 1030 11/14/22 1247   BP: 117/73 133/81 129/73   Pulse: 74 78 68   Resp: 16 16 16   Temp: 97.9 °F (36.6 °C)     TempSrc: Oral     SpO2: 98% 98% 97%       Constitutional:     General: in acute distress. GENERAL APPEARANCE: Awake and alert. Cooperative. HEAD: Normocephalic. Atraumatic. NECK: Supple, trachea midline. HEART: RRR. No harsh murmurs. Intact radial pulses 2+ bilaterally. LUNGS: Respirations unlabored without accessory muscle use. Speaking comfortably in full sentences. ABDOMEN: Soft. Non-distended. Non-tender. No guarding or rebound. EXTREMITIES: Right hip showed no swelling, abrasions, redness. No peripheral edema. No acute deformities. Left shoulder examination: No redness, no swelling, no abrasions. SKIN: Warm and dry. No acute rashes. NEUROLOGICAL: Alert and oriented X 3.   No focal deficits        ED Course    ED Medication Orders (From admission, onward)      Start Ordered     Status Ordering Provider    11/14/22 1130 11/14/22 1119  phenylephrine (STEVE-SYNEPHRINE) 500 mcg/mL injection (FOR PRIAPISM)  ONCE         Last MAR action: Canceled Entry - by NATHALY Lockwood on 11/14/22 at [address] [place] [address] Drive    11/14/22 1115 11/14/22 1109  pseudoephedrine (SUDAFED) tablet 60 mg  ONCE         Last MAR action: Given - by NATHALY Lockwood on 11/14/22 at [address] [facility]    11/14/22 1015 11/14/22 1012  HYDROmorphone (DILAUDID) injection 0.5 mg  ONCE         Last MAR action: Given - by NATHALY CARBALLO on 11/14/22 at [address] [place] [address]    11/14/22 1015 11/14/22 1012  ketorolac (TORADOL) injection 30 mg  ONCE         Last MAR action: Given - [facility]    11/14/22 1015 11/14/22 1012  0.9 % sodium chloride infusion  CONTINUOUS         Last MAR action: Stopped - by NATHALY Restrepo on 11/14/22 at . [address]            EKG  NA      Radiology  No results found.     Labs  Results for orders placed or performed during the hospital encounter of 11/14/22   CBC with Auto Differential   Result Value Ref Range    WBC 16.0 (H) 4.0 - 11.0 K/uL    RBC 2.49 (L) 4.20 - 5.90 M/uL    Hemoglobin 8.0 (L) 13.5 - 17.5 g/dL    Hematocrit 23.1 (L) 40.5 - 52.5 %    MCV 93.0 80.0 - 100.0 fL    MCH 32.2 26.0 - 34.0 pg    MCHC 34.6 31.0 - 36.0 g/dL    RDW 22.4 (H) 12.4 - 15.4 %    Platelets 751 (H) 480 - 450 K/uL    MPV 8.1 5.0 - 10.5 fL    PLATELET SLIDE REVIEW Increased     SLIDE REVIEW see below     Neutrophils % 74.0 %    Lymphocytes % 18.0 %    Monocytes % 4.0 %    Eosinophils % 2.0 %    Basophils % 0.0 %    Neutrophils Absolute 12.2 (H) 1.7 - 7.7 K/uL    Lymphocytes Absolute 2.9 1.0 - 5.1 K/uL    Monocytes Absolute 0.6 0.0 - 1.3 K/uL    Eosinophils Absolute 0.3 0.0 - 0.6 K/uL    Basophils Absolute 0.0 0.0 - 0.2 K/uL    Bands Relative 2 0 - 7 %    nRBC 3 (A) /100 WBC    Polychromasia Occasional (A)     Sickle Cells 3+ (A)    Comprehensive Metabolic Panel   Result Value Ref Range    Sodium 139 136 - 145 mmol/L    Potassium 4.0 3.5 - 5.1 mmol/L    Chloride 106 99 - 110 mmol/L    CO2 23 21 - 32 mmol/L    Anion Gap 10 3 - 16    Glucose 95 70 - 99 mg/dL    BUN 10 7 - 20 mg/dL    Creatinine 0.6 (L) 0.9 - 1.3 mg/dL    Est, Glom Filt Rate >60 >60    Calcium 9.5 8.3 - 10.6 mg/dL    Total Protein 7.3 6.4 - 8.2 g/dL    Albumin 4.5 3.4 - 5.0 g/dL Albumin/Globulin Ratio 1.6 1.1 - 2.2    Total Bilirubin 6.0 (H) 0.0 - 1.0 mg/dL    Alkaline Phosphatase 76 40 - 129 U/L    ALT 11 10 - 40 U/L    AST 34 15 - 37 U/L   Reticulocytes   Result Value Ref Range    Retic Ct Pct 10.96 (H) 0.50 - 2.18 %    Retic Ct Abs 0.272 M/uL    Immature Retic Fract 0.65 (H) 0.21 - 0.37       Procedures  NA    MDM    Patient is a 16[address] [facility] y.o. male with a significant PMHx of sickle cell disease presents to the ED with complaints of sickle cell pain crisis and priapism. In the ED, patient hemodynamically stable and afebrile. No redness, erythema on right hip or left shoulder. Patient received Toradol 30 mg injection, Dilaudid and was started on IV fluid. Patient received dose of sudafed and hot pack placed on penis for priapism. Urologist consulted for priapism, recommended dose of sudafed and start patient IV fluids . By the time urologist came to do phenylephrine injection, priapism was improving. Urologist cancelled phenylephrine injection. Upon reevaluation, patient felt better. Patient was advised to follow-up with his primary heme-onc after discharge. Patient was advised to return to ED if symptoms recur or worsen. Medications   0.9 % sodium chloride infusion (0 mLs IntraVENous Stopped 11/14/22 1207)   phenylephrine (STEVE-SYNEPHRINE) 500 mcg/mL injection (FOR PRIAPISM) ( IntraCAVernosal Canceled Entry 11/14/22 1251)   HYDROmorphone (DILAUDID) injection 0.5 mg (0.5 mg IntraVENous Given 11/14/22 1034)   ketorolac (TORADOL) injection 30 mg (30 mg IntraVENous Given 11/14/22 1033)   pseudoephedrine (SUDAFED) tablet 60 mg (60 mg Oral Given 11/14/22 1207)       - I have a low concern for other emergent process, and do not see indication for further work-up in the ER, as it is unlikely  and poses more risk than benefit. - I discussed the results, including any incidental findings, with patient. Questions answered.  Patient/family agreeable to plan and express understanding of plan. Disposition:  Discharge to home in stable condition. Consult this encounter: urologist     Clinical Impression:  1. Sickle cell crisis ([address]    2. 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 rate 16, SpO2 97 %. Patient was given prescriptions for the following medications. I counseled patient how to take these medications. New Prescriptions    No medications on file       Disposition referral (if applicable):  MD Eddie Moulton  [phone]    Schedule an appointment as soon as possible for a visit in 2 days      [facility] [address] [phone]    If symptoms worsen, As needed      Total critical care time is 240 minutes, which excludes separately billable procedures and updating family. Time spent is specifically for management of the presenting complaint and symptoms initially, direct bedside care, reevaluation, review of records, and consultation. There was a high probability of clinically significant life-threatening deterioration in the patient's condition, which required my urgent intervention. This chart was generated in part by using Dragon Dictation system and may contain errors related to that system including errors in grammar, punctuation, and spelling, as well as words and phrases that may be inappropriate. If there are any questions or concerns please feel free to contact the dictating provider for clarification.      [address] [place]  Resident  11/14/22 8894

## 2022-11-14 NOTE — ED PROVIDER NOTES
CHIEF COMPLAINT  Sickle Cell Pain Crisis (Starting when he awoke this morning, pain in right hip.)      HISTORY OF PRESENT ILLNESS  Mir Joe is a 20 y.o. male with a history of sickle cell disease presenting for evaluation of sickle cell pain crisis. Patient states that he had pain in the right hip starting this morning. Denies any chest pain, difficulty breathing, fever. No recent falls or trauma. He states he has previously had pain on the right hip as a result of sickle cell pain crisis. He is able to ambulate. No priapism. No other complaints, modifying factors or associated symptoms. I have reviewed the following from the nursing documentation.    Past Medical History:   Diagnosis Date    Accidental overdose     Asthma     [facility] 10[facility]19/2021    R leg    Enlarged heart     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)     Sickle cell anemia (HCC)      Past Surgical History:   Procedure Laterality Date    SPLENECTOMY       Family History   Problem Relation Age of Onset    Other Father         sarcoidosis     Social History     Socioeconomic History    Marital status: Single     Spouse name: Not on file    Number of children: 0    Years of education: Not on file    Highest education level: Not on file   Occupational History    Not on file   Tobacco Use    Smoking status: Never    Smokeless tobacco: Never   Vaping Use    Vaping Use: Never used   Substance and Sexual Activity    Alcohol use: Not Currently    Drug use: Never    Sexual activity: Not Currently   Other Topics Concern    Not on file   Social History Narrative    Not on file     Social Determinants of Health     Financial Resource Strain: Not on file   Food Insecurity: Not on file   Transportation Needs: Not on file   Physical Activity: Not on file   Stress: Not on file   Social Connections: Not on file   Intimate Partner Violence: Not on file   Housing Stability: Not on file     No current facility-administered medications for this encounter. Current Outpatient Medications   Medication Sig Dispense Refill    sildenafil (REVATIO) 20 MG tablet Take 1 tablet by mouth daily 30 tablet 0    DULoxetine (CYMBALTA) 30 MG extended release capsule  (Patient not taking: Reported on 8/27/2022)      apixaban (ELIQUIS) 5 MG TABS tablet Take 1 tablet by mouth 2 times daily (Patient not taking: Reported on 8/27/2022) 60 tablet 0    pantoprazole (PROTONIX) 40 MG tablet Take 1 tablet by mouth every morning (before breakfast) 30 tablet 0    folic acid (FOLVITE) 1 MG tablet Take 2 tablets by mouth daily 30 tablet 3    oxyCODONE HCl (OXY-IR) 10 MG immediate release tablet Take 10 mg by mouth every 4 hours as needed for Pain. albuterol sulfate HFA (PROVENTIL;VENTOLIN;PROAIR) 108 (90 Base) MCG/ACT inhaler Albuterol HFA Inhaler 90 mcg/actuation  inhaled  2 puffs prn     Active      levalbuterol (XOPENEX) 0.31 MG/3ML nebulization Levalbuterol Nebulized    2 puffs prn     Active      docusate (COLACE, DULCOLAX) 100 MG CAPS Take 100 mg by mouth (Patient not taking: Reported on 8/27/2022)      ibuprofen (ADVIL;MOTRIN) 800 MG tablet ibuprofen 800 MG Oral Tablet  oral   prn    Active       Allergies   Allergen Reactions    Morphine Shortness Of Breath     Other reaction(s): Histamine-like Reaction  Has asthma exacerbation with morphine-histamine type reaction         REVIEW OF SYSTEMS  Positive and pertinent negatives as per HPI. All other systems were reviewed and are negative. PHYSICAL EXAM  /62   Pulse 74   Temp 98.7 °F (37.1 °C) (Oral)   Resp 17   Ht 5' 9\" (1.753 m)   Wt 190 lb (86.2 kg)   SpO2 95%   BMI 28.06 kg/m²   GENERAL APPEARANCE: Awake and alert. Cooperative. HEAD: Normocephalic. Atraumatic. NECK: Supple, trachea midline. HEART: RRR. No harsh murmurs. Intact radial pulses 2+ bilaterally. LUNGS: Respirations unlabored without accessory muscle use. Speaking comfortably in full sentences. ABDOMEN: Soft. Non-distended. Non-tender. No guarding or rebound. EXTREMITIES: No peripheral edema. No acute deformities. SKIN: Warm and dry. No acute rashes. NEUROLOGICAL: Alert and oriented X 3. No focal deficits    LABS  I have reviewed all labs for this visit. Results for orders placed or performed during the hospital encounter of 11/13/22   CBC with Auto Differential   Result Value Ref Range    WBC 18.9 (H) 4.0 - 11.0 K/uL    RBC 2.60 (L) 4.20 - 5.90 M/uL    Hemoglobin 8.3 (L) 13.5 - 17.5 g/dL    Hematocrit 24.7 (L) 40.5 - 52.5 %    MCV 94.9 80.0 - 100.0 fL    MCH 31.9 26.0 - 34.0 pg    MCHC 33.6 31.0 - 36.0 g/dL    RDW 21.9 (H) 12.4 - 15.4 %    Platelets 808 (H) 171 - 450 K/uL    MPV 8.4 5.0 - 10.5 fL    PLATELET SLIDE REVIEW Increased     Neutrophils % 72.0 %    Lymphocytes % 18.0 %    Monocytes % 9.0 %    Eosinophils % 1.0 %    Basophils % 0.0 %    Neutrophils Absolute 13.6 (H) 1.7 - 7.7 K/uL    Lymphocytes Absolute 3.4 1.0 - 5.1 K/uL    Monocytes Absolute 1.7 (H) 0.0 - 1.3 K/uL    Eosinophils Absolute 0.2 0.0 - 0.6 K/uL    Basophils Absolute 0.0 0.0 - 0.2 K/uL    Anisocytosis 1+ (A)     Macrocytes Occasional (A)     Polychromasia Occasional (A)     Poikilocytes 2+ (A)     Acanthocytes Occasional (A)     Ovalocytes 1+ (A)     Stomatocytes Occasional (A)     Target Cells Occasional (A)     Sickle Cells Occasional (A)    BMP w/ Reflex to MG   Result Value Ref Range    Sodium 140 136 - 145 mmol/L    Potassium reflex Magnesium 4.6 3.5 - 5.1 mmol/L    Chloride 107 99 - 110 mmol/L    CO2 24 21 - 32 mmol/L    Anion Gap 9 3 - 16    Glucose 90 70 - 99 mg/dL    BUN 10 7 - 20 mg/dL    Creatinine 0.9 0.9 - 1.3 mg/dL    Est, Glom Filt Rate >60 >60    Calcium 9.4 8.3 - 10.6 mg/dL   Reticulocytes   Result Value Ref Range    Retic Ct Pct 13.48 (H) 0.50 - 2.18 %    Retic Ct Abs 0.351 M/uL    Immature Retic Fract 0.61 (H) 0.21 - 0.37           RADIOLOGY  X-RAYS:  I have reviewed radiologic plain film image(s).   ALL OTHER NON-PLAIN FILM IMAGES SUCH AS CT, ULTRASOUND AND MRI HAVE BEEN READ BY THE RADIOLOGIST. No orders to display          No results found. During the patient's ED course, the patient was given:  Medications   0.9 % sodium chloride bolus (0 mLs IntraVENous Stopped 11/14/22 0017)   ketorolac (TORADOL) injection 30 mg (30 mg IntraVENous Given 11/13/22 2154)   HYDROmorphone (DILAUDID) injection 1 mg (1 mg IntraVENous Given 11/13/22 2154)        ED COURSE/MDM  Patient seen and evaluated. Old records reviewed. Labs and imaging reviewed and results discussed with patient. 59-year-old male presenting for evaluation of right hip pain, consistent with sickle cell pain crisis. He arrives with stable vital signs, he is afebrile. No swelling to the right hip or overlying erythema. Per his care plan, we obtain laboratory evaluation, reticulocyte count, he will receive one-time dose of Toradol, Dilaudid, IV fluid. I have discussed with the patient that we will do this round of pain medicine and if his symptoms do not improve, he will require admission for continued management of his pain. Reticulocyte count is appropriate. Hemoglobin is at baseline. No significant leukocytosis. Patient had appropriate response to pain medication above and will be discharged home. The patient will be discharged from the emergency department. The patient was counseled on their diagnosis and any medications prescribed. They were advised on the need for PCP followup. They were counseled on the need to return to the emergency department if any of their symptoms were to worsen, change or have any other concerns. Discharged in stable condition. Patient was given scripts for the following medications. I counseled patient how to take these medications. Discharge Medication List as of 11/14/2022  1:08 AM          CLINICAL IMPRESSION  1.  Sickle cell pain crisis (HCC)        Blood pressure 135/62, pulse 74, temperature 98.7 °F (37.1 °C), temperature source Oral, resp. rate 17, height 5' 9\" (1.753 m), weight 190 lb (86.2 kg), SpO2 95 %. [facility] to [facility]  in stable condition. This chart was generated in part by using Dragon Dictation system and may contain errors related to that system including errors in grammar, punctuation, and spelling, as well as words and phrases that may be inappropriate. If there are any questions or concerns please feel free to contact the dictating provider for clarification.        Janessa Hidalgo MD  11/14/22 7833

## 2022-11-14 NOTE — CONSULTS
80 - called [facility] per consult  Re: priapism, sickle cell crisis  1030 - PA Tim Davenport called back to speak with Dr Otis Martines

## 2022-11-14 NOTE — ED NOTES
Discharge instructions explained by ED provider. Patient verbalized understanding and denies any other concerns or complaints at this time. Patient vital signs stable and no acute signs or symptoms of distress noted at discharge. Patient deemed clinically stable. Patient d/c home.      Chapis Moctezuma RN  11/14/22 3169

## 2022-11-14 NOTE — CONSULTS
Urology Attending Consult Note      Reason for Consultation: priapism    History: This is a 20 y.o. male who presented with stuttering priapism. Urology is consulted for evaluation of above. 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 Imaging: na    Family History, Social History, Review of Systems:  Reviewed and agreed to as per chart    Vitals:  /73   Pulse 68   Temp 97.9 °F (36.6 °C) (Oral)   Resp 16   SpO2 97%   Temp  Av.3 °F (36.8 °C)  Min: 97.9 °F (36.6 °C)  Max: 98.7 °F (37.1 °C)    Intake/Output Summary (Last 24 hours) at 2022 1751  Last data filed at 2022 1207  Gross per 24 hour   Intake 1000 ml   Output --   Net 1000 ml         Physical:  Well developed, well nourished in no acute distress  Mood indicates no abnormalities. Pt doesnt appear depressed  Orientated to time and place  Neck is supple, trachea is midline  Respiratory effort is normal  Cardiovascular show no extremity swelling  Abdomen no masses or hernias are palpated, there is no tenderness. Liver and Spleen appear normal.  Skin show no abnormal lesions  Lymph nodes are not palpated in the inguinal, neck, or axillary area.      Male :  Penis is able to be bended and very minimally rigid on my exam       Labs:  WBC:    Lab Results   Component Value Date/Time    WBC 16.0 2022 09:45 AM     Hemoglobin/Hematocrit:    Lab Results   Component Value Date/Time    HGB 8.0 2022 09:45 AM    HCT 23.1 2022 09:45 AM     BMP:    Lab Results   Component Value Date/Time     2022 09:45 AM    K 4.0 2022 09:45 AM    K 4.6 2022 09:07 PM     2022 09:45 AM    CO2 23 2022 09:45 AM    BUN 10 2022 09:45 AM    LABALBU 4.5 2022 09:45 AM    CREATININE 0.6 2022 09:45 AM    CALCIUM 9.5 2022 09:45 AM    GFRAA >60 2022 02:12 AM    LABGLOM >60 2022 09:45 AM PT/INR:    Lab Results   Component Value Date/Time    PROTIME 15.5 08/28/2022 06:48 AM    INR 1.24 08/28/2022 06:48 AM     PTT:    Lab Results   Component Value Date/Time    APTT 30.8 08/28/2022 06:48 AM   [APTT        Impression/Plan: 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

## 2022-11-21 ENCOUNTER — APPOINTMENT (OUTPATIENT)
Dept: GENERAL RADIOLOGY | Age: 22
End: 2022-11-21
Payer: COMMERCIAL

## 2022-11-21 ENCOUNTER — HOSPITAL ENCOUNTER (EMERGENCY)
Age: 22
Discharge: HOME | End: 2022-11-21
Attending: EMERGENCY MEDICINE
Payer: COMMERCIAL

## 2022-11-21 VITALS
OXYGEN SATURATION: 94 % | HEART RATE: 76 BPM | WEIGHT: 200 LBS | DIASTOLIC BLOOD PRESSURE: 62 MMHG | RESPIRATION RATE: 16 BRPM | SYSTOLIC BLOOD PRESSURE: 120 MMHG | HEIGHT: 69 IN | TEMPERATURE: 98 F | BODY MASS INDEX: 29.62 KG/M2

## 2022-11-21 DIAGNOSIS — N48.30 PRIAPISM: Primary | ICD-10-CM

## 2022-11-21 DIAGNOSIS — D57.00 SICKLE CELL PAIN CRISIS (HCC): ICD-10-CM

## 2022-11-21 LAB
A/G RATIO: 2 (ref 1.1–2.2)
ALBUMIN SERPL-MCNC: 4.3 G/DL (ref 3.4–5)
ALP BLD-CCNC: 81 U/L (ref 40–129)
ALT SERPL-CCNC: 20 U/L (ref 10–40)
ANION GAP SERPL CALCULATED.3IONS-SCNC: 11 MMOL/L (ref 3–16)
AST SERPL-CCNC: 33 U/L (ref 15–37)
BANDED NEUTROPHILS RELATIVE PERCENT: 2 % (ref 0–7)
BASOPHILS ABSOLUTE: 0 K/UL (ref 0–0.2)
BASOPHILS RELATIVE PERCENT: 0 %
BILIRUB SERPL-MCNC: 5.1 MG/DL (ref 0–1)
BUN BLDV-MCNC: 8 MG/DL (ref 7–20)
CALCIUM SERPL-MCNC: 9.3 MG/DL (ref 8.3–10.6)
CHLORIDE BLD-SCNC: 106 MMOL/L (ref 99–110)
CO2: 25 MMOL/L (ref 21–32)
CREAT SERPL-MCNC: 0.8 MG/DL (ref 0.9–1.3)
EOSINOPHILS ABSOLUTE: 0.3 K/UL (ref 0–0.6)
EOSINOPHILS RELATIVE PERCENT: 2 %
GFR SERPL CREATININE-BSD FRML MDRD: >60 ML/MIN/{1.73_M2}
GLUCOSE BLD-MCNC: 119 MG/DL (ref 70–99)
HCT VFR BLD CALC: 20.2 % (ref 40.5–52.5)
HEMOGLOBIN: 7.3 G/DL (ref 13.5–17.5)
IMMATURE RETIC FRACT: 0.72 (ref 0.21–0.37)
LYMPHOCYTES ABSOLUTE: 4.1 K/UL (ref 1–5.1)
LYMPHOCYTES RELATIVE PERCENT: 30 %
MCH RBC QN AUTO: 33.7 PG (ref 26–34)
MCHC RBC AUTO-ENTMCNC: 36.3 G/DL (ref 31–36)
MCV RBC AUTO: 92.9 FL (ref 80–100)
MONOCYTES ABSOLUTE: 1.1 K/UL (ref 0–1.3)
MONOCYTES RELATIVE PERCENT: 8 %
NEUTROPHILS ABSOLUTE: 8.2 K/UL (ref 1.7–7.7)
NEUTROPHILS RELATIVE PERCENT: 58 %
NUCLEATED RED BLOOD CELLS: 1 /100 WBC
OVALOCYTES: ABNORMAL
PDW BLD-RTO: 21.3 % (ref 12.4–15.4)
PLATELET # BLD: 394 K/UL (ref 135–450)
PMV BLD AUTO: 8.1 FL (ref 5–10.5)
POIKILOCYTES: ABNORMAL
POLYCHROMASIA: ABNORMAL
POTASSIUM REFLEX MAGNESIUM: 3.6 MMOL/L (ref 3.5–5.1)
RBC # BLD: 2.18 M/UL (ref 4.2–5.9)
RETICULOCYTE ABSOLUTE COUNT: 0.2 M/UL
RETICULOCYTE COUNT PCT: 9.94 % (ref 0.5–2.18)
SCHISTOCYTES: ABNORMAL
SEDIMENTATION RATE, ERYTHROCYTE: <1 MM/HR (ref 0–15)
SODIUM BLD-SCNC: 142 MMOL/L (ref 136–145)
STOMATOCYTES: ABNORMAL
TARGET CELLS: ABNORMAL
TOTAL PROTEIN: 6.4 G/DL (ref 6.4–8.2)
WBC # BLD: 13.7 K/UL (ref 4–11)

## 2022-11-21 PROCEDURE — 6370000000 HC RX 637 (ALT 250 FOR IP): Performed by: EMERGENCY MEDICINE

## 2022-11-21 PROCEDURE — 6360000002 HC RX W HCPCS: Performed by: EMERGENCY MEDICINE

## 2022-11-21 PROCEDURE — 99284 EMERGENCY DEPT VISIT MOD MDM: CPT

## 2022-11-21 PROCEDURE — 80053 COMPREHEN METABOLIC PANEL: CPT

## 2022-11-21 PROCEDURE — 71045 X-RAY EXAM CHEST 1 VIEW: CPT

## 2022-11-21 PROCEDURE — 96374 THER/PROPH/DIAG INJ IV PUSH: CPT

## 2022-11-21 PROCEDURE — 2580000003 HC RX 258: Performed by: EMERGENCY MEDICINE

## 2022-11-21 PROCEDURE — 85025 COMPLETE CBC W/AUTO DIFF WBC: CPT

## 2022-11-21 PROCEDURE — 85652 RBC SED RATE AUTOMATED: CPT

## 2022-11-21 PROCEDURE — 85045 AUTOMATED RETICULOCYTE COUNT: CPT

## 2022-11-21 PROCEDURE — 64450 NJX AA&/STRD OTHER PN/BRANCH: CPT

## 2022-11-21 PROCEDURE — 96375 TX/PRO/DX INJ NEW DRUG ADDON: CPT

## 2022-11-21 RX ORDER — PSEUDOEPHEDRINE HCL 30 MG
60 TABLET ORAL ONCE
Status: COMPLETED | OUTPATIENT
Start: 2022-11-21 | End: 2022-11-21

## 2022-11-21 RX ORDER — 0.9 % SODIUM CHLORIDE 0.9 %
1000 INTRAVENOUS SOLUTION INTRAVENOUS ONCE
Status: COMPLETED | OUTPATIENT
Start: 2022-11-21 | End: 2022-11-21

## 2022-11-21 RX ORDER — SILDENAFIL CITRATE 20 MG/1
20 TABLET ORAL DAILY
Qty: 30 TABLET | Refills: 0 | Status: SHIPPED | OUTPATIENT
Start: 2022-11-21 | End: 2022-12-21

## 2022-11-21 RX ORDER — KETOROLAC TROMETHAMINE 30 MG/ML
15 INJECTION, SOLUTION INTRAMUSCULAR; INTRAVENOUS ONCE
Status: COMPLETED | OUTPATIENT
Start: 2022-11-21 | End: 2022-11-21

## 2022-11-21 RX ADMIN — KETOROLAC TROMETHAMINE 15 MG: 30 INJECTION, SOLUTION INTRAMUSCULAR; INTRAVENOUS at 05:00

## 2022-11-21 RX ADMIN — PSEUDOEPHEDRINE HCL 60 MG: 30 TABLET, FILM COATED ORAL at 04:44

## 2022-11-21 RX ADMIN — SODIUM CHLORIDE 1000 ML: 9 INJECTION, SOLUTION INTRAVENOUS at 05:01

## 2022-11-21 RX ADMIN — HYDROMORPHONE HYDROCHLORIDE 0.5 MG: 0.5 INJECTION, SOLUTION INTRAMUSCULAR; INTRAVENOUS; SUBCUTANEOUS at 05:00

## 2022-11-21 ASSESSMENT — PAIN SCALES - GENERAL
PAINLEVEL_OUTOF10: 7
PAINLEVEL_OUTOF10: 10
PAINLEVEL_OUTOF10: 10

## 2022-11-21 ASSESSMENT — PAIN - FUNCTIONAL ASSESSMENT: PAIN_FUNCTIONAL_ASSESSMENT: 0-10

## 2022-11-21 ASSESSMENT — LIFESTYLE VARIABLES
HOW OFTEN DO YOU HAVE A DRINK CONTAINING ALCOHOL: NEVER
HOW MANY STANDARD DRINKS CONTAINING ALCOHOL DO YOU HAVE ON A TYPICAL DAY: PATIENT DOES NOT DRINK

## 2022-11-21 NOTE — ED PROVIDER NOTES
[facility]  ED  EMERGENCY DEPARTMENT ENCOUNTER      Pt Name: Regina Zafar  MRN: [id number]  [place] 2000  Date of evaluation: 11/21/2022  Provider: Magi Best MD    CHIEF COMPLAINT       Chief Complaint   Patient presents with    Groin Pain    Sickle Cell Pain Crisis         HISTORY OF PRESENT ILLNESS   (Location/Symptom, Timing/Onset, Context/Setting, Quality, Duration, Modifying Factors, Severity)  Note limiting factors. Regina Zafar is a 20 y.o. male with past medical history of skull cell disease, DVT on anticoagulation and recent recurrent priapism here today for the same. Patient states he woke up at 1 AM with a painful erection. Notes a throbbing aching discomfort in his penis. He has been able to urinate. States has had this numerous times and has been diagnosed with priapism. He is supposed to be taking sildenafil but states he \"needs a new prescription for this\". He states he has a follow-up with urology next month. Denies recent dysuria or hematuria. No testicular pain or swelling. No abdominal pain. Pain severe without alleviating factors. [facility]    Nursing Notes were reviewed. REVIEW OF SYSTEMS    (2-9 systems for level 4, 10 or more for level 5)     Review of Systems    Please see HPI for pertinent positive and negative review of system findings. A full 10 system ROS was performed and otherwise negative.         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 sildenafil (REVATIO) 20 MG tablet Take 1 tablet by mouth daily, Disp-30 tablet, R-0Normal      DULoxetine (CYMBALTA) 30 MG extended release capsule Historical Med      apixaban (ELIQUIS) 5 MG TABS tablet Take 1 tablet by mouth 2 times daily, Disp-60 tablet, R-0Normal      pantoprazole (PROTONIX) 40 MG tablet Take 1 tablet by mouth every morning (before breakfast), Disp-30 tablet, [id number]      folic acid (FOLVITE) 1 MG tablet Take 2 tablets by mouth daily, Disp-30 tablet, R-3Normal      oxyCODONE HCl (OXY-IR) 10 MG immediate release tablet Take 10 mg by mouth every 4 hours as needed for Pain. Historical Med      albuterol sulfate HFA (PROVENTIL;VENTOLIN;PROAIR) 108 (90 Base) MCG/ACT inhaler Albuterol HFA Inhaler 90 mcg/actuation  inhaled  2 puffs prn     ActiveHistorical Med      levalbuterol (XOPENEX) 0.31 MG/3ML nebulization Levalbuterol Nebulized    2 puffs prn     ActiveHistorical Med      docusate (COLACE, DULCOLAX) 100 MG CAPS Take 100 mg by mouthHistorical Med      ibuprofen (ADVIL;MOTRIN) 800 MG tablet ibuprofen 800 MG Oral Tablet  oral   prn    ActiveHistorical Med       !! - Potential duplicate medications found. Please discuss with provider.           ALLERGIES     Morphine    FAMILY HISTORY       Family History   Problem Relation Age of Onset    Other Father         sarcoidosis          SOCIAL HISTORY       Social History     Socioeconomic History    Marital status: Single     Spouse name: None    Number of children: 0    Years of education: None    Highest education level: None   Tobacco Use    Smoking status: Never    Smokeless tobacco: Never   Vaping Use    Vaping Use: Never used   Substance and Sexual Activity    Alcohol use: Not Currently    Drug use: Never    Sexual activity: Not Currently       SCREENINGS    [place] Coma Scale  Eye Opening: Spontaneous  Best Verbal Response: Oriented  Best Motor Response: Obeys commands  [place] Coma Scale Score: 15          PHYSICAL EXAM    (up to 7 for level 4, 8 or more for level 5)     ED Triage Vitals [11/21/22 0409]   BP Temp Temp src Heart Rate Resp SpO2 Height Weight   (!) 146/83 98 °F (36.7 °C) -- 90 22 99 % 5' 9\" (1.753 m) 200 lb (90.7 kg)       Physical Exam    General appearance:  Cooperative. Uncomfortable appearing. Skin:  Warm. Dry. Eye:  Extraocular movements intact. Ears, nose, mouth and throat:  Oral mucosa moist,  Neck:  Trachea midline. Heart:  Regular rate and rhythm  Perfusion:  intact  Respiratory:  Respirations nonlabored. Lungs clear to auscultation bilaterally. Abdominal:   Non distended. Nontender  : Erect penis. Testicles normal in shape, size and lie with positive cremasteric reflex bilaterally. Neurological:  Alert and oriented x 3.   Moves all extremities spontaneously  Musculoskeletal:   Normal ROM, no deformities          Psychiatric:  Normal mood      DIAGNOSTIC RESULTS       Labs Reviewed   CBC WITH AUTO DIFFERENTIAL - Abnormal; Notable for the following components:       Result Value    WBC 13.7 (*)     RBC 2.18 (*)     Hemoglobin 7.3 (*)     Hematocrit 20.2 (*)     MCHC 36.3 (*)     RDW 21.3 (*)     Neutrophils Absolute 8.2 (*)     nRBC 1 (*)     Polychromasia Occasional (*)     Poikilocytes 2+ (*)     Schistocytes Occasional (*)     Ovalocytes 1+ (*)     Stomatocytes Occasional (*)     Target Cells Occasional (*)     All other components within normal limits    Narrative:     Chante Martini tel. [phone],  Hematology results called to and read back by Nino Blue RN, 11/21/2022  05:08, by Carranza Pump   COMPREHENSIVE METABOLIC PANEL W/ REFLEX TO MG FOR LOW K - Abnormal; Notable for the following components:    Glucose 119 (*)     Creatinine 0.8 (*)     Total Bilirubin 5.1 (*)     All other components within normal limits   RETICULOCYTES - Abnormal; Notable for the following components:    Retic Ct Pct 9.94 (*)     Immature Retic Fract 0.72 (*)     All other components within normal limits    Narrative:     Chante Martini tel. [phone],  Hematology results called to and read back by Glenys Purdy RN, 11/21/2022  05:08, by [address]    Narrative:     Sharron Corrigan [phone],  Hematology results called to and read back by Glenys Purdy RN, 11/21/2022  05:08, by Brittany Springer       Interpretation per the Radiologist below, if obtained/available at the time of this note:    XR CHEST PORTABLE   Final Result   Unchanged appearance of the chest without acute airspace disease identified. All other labs/imaging were within normal range or not returned as of this dictation. EMERGENCY DEPARTMENT COURSE and DIFFERENTIAL DIAGNOSIS/MDM:   Vitals:    Vitals:    11/21/22 0409 11/21/22 0706   BP: (!) 146/83 120/62   Pulse: 90 76   Resp: 22 16   Temp: 98 °F (36.7 °C)    SpO2: 99% 94%   Weight: 200 lb (90.7 kg)    Height: 5' 9\" (1.753 m)        Patient presented to the emergency department today complaining of painful penis. History of priapism with underlying sickle cell disease as a likely etiology. Multiple prior presentations for the same. Noncompliant with outpatient urology follow-up or sildenafil use. This was recognized immediately upon arrival.  He was given oral Sudafed. Ice packs were placed on his penis. Unfortunately this did not result in detumescence. Then, a penile block was performed. I aspirated 30 mL of dark blood from the patient's penis. I then injected 150 mcg of phenylephrine and wrapped the penis with an Ace wrap and did achieve detumescence. Basic laboratory studies do note a hemoglobin of 7.3. He is to be transfused for hemoglobin less than 7 but at present does not need transfusion. Consult to urology was placed I did speak to the on-call urologist Dr. Brianna Bustos. I reviewed the patient's treatment with him who is quite happy with his overall work-up and the procedures performed. He was happy to have the patient follow-up as an outpatient.   The patient's sildenafil was refilled here and he was encouraged to take this medication as he has historically been quite noncompliant and also follow-up with urology as he has been noncompliant with outpatient follow-up as well. Gabo Drummond M.D., am the primary clinician of record. MDM      CONSULTS     IP CONSULT TO UROLOGY    Critical Care:     CRITICAL CARE TIME    I personally saw the patient and independently provided 30 minutes of non-concurrent critical care out of the total shared critical care time provided, excluding separately reportable procedures. There was a high probability of clinically significant/life threatening deterioration in the patient's condition which required my urgent intervention. This time was spent reviewing the patient chart, interpreting diagnostic/laboratory data, emergent evaluation for priapism with concern for ischemic penis speaking with consulting physicians      REASSESSMENT          PROCEDURE     Unless otherwise noted below, none     Procedure:  Penile nerve block with penile aspiration and phenylephrine injection  Nerve Block    Date/Time: 11/21/2022 6:02 AM  Performed by: Michelle Gordon MD  Authorized by: Michelle Gordon MD     Consent:     Consent obtained:  Verbal    Consent given by:  Patient    Risks, benefits, and alternatives were discussed: yes    Universal protocol:     Procedure explained and questions answered to patient or proxy's satisfaction: yes      Patient identity confirmed:  Verbally with patient  Indications:     Indications:  Procedural anesthesia  Location:     Nerve block body site: Penis. Laterality:  Bilateral (Penile ring block)  Pre-procedure details:     Skin preparation:  Povidone-iodine    Preparation: Patient was prepped and draped in usual sterile fashion    Skin anesthesia:     Skin anesthesia method:  Local infiltration    Local anesthetic:  Lidocaine 2% w/o epi  Procedure details:     Block needle gauge:  27 G    Block anesthetic: 2% lidocaine without epinephrine.     Injection procedure:  Anatomic landmarks identified  Post-procedure details:     Dressing:  None    Procedure completion:  Tolerated  Comments:      Once the penis was appropriately anesthetized I then used a 21-gauge butterfly needle to drain 30 mL of dark blood from the patient's penis at the left corpora cavernosa. Then, 1.5 mL or 150 mcg of phenylephrine was injected into the left corpora cavernosa. Patient tolerated this well. Penis was wrapped in an Ace wrap and detumescence was achieved. FINAL IMPRESSION      1. Priapism    2. Sickle cell pain crisis [facility])            DISPOSITION/PLAN   DISPOSITION Decision To Discharge 11/21/2022 06:05:17 AM        PATIENT REFERRED TO:  Po Walton MD  [id number]  [phone]    Schedule an appointment as soon as possible for a visit       DISCHARGE MEDICATIONS:  Discharge Medication List as of 11/21/2022  6:57 AM        START taking these medications    Details   !! sildenafil (REVATIO) 20 MG tablet Take 1 tablet by mouth daily, Disp-30 tablet, R-0Normal       !! - Potential duplicate medications found. Please discuss with provider. Controlled Substances Monitoring:     No flowsheet data found.     (Please note that portions of this note were completed with a voice recognition program.  Efforts were made to edit the dictations but occasionally words are mis-transcribed.)    Vikki Nunez MD (electronically signed)  Attending Emergency Physician            Laure Arreaga MD  11/21/22 2028

## 2022-11-21 NOTE — ED NOTES
Pt pO2 dropping while sleeping, placed on 3 lt pO2, dr Jeancarlos Rodriguez notified     Farooq Johns RN  11/21/22 5297

## 2022-11-21 NOTE — CONSULTS
Placed a consult to Urology @ 7058  RE: priapism veda Rasmussen MD   5887 - 2nd PS to Dr Clarita Mulligan [facility]  2619 - Dr Clarita Mulligan called back to speak with Dr Deborah Rasmussen

## 2022-12-01 ENCOUNTER — HOSPITAL ENCOUNTER (EMERGENCY)
Age: 22
Discharge: HOME | End: 2022-12-01
Payer: COMMERCIAL

## 2022-12-01 VITALS
HEART RATE: 86 BPM | SYSTOLIC BLOOD PRESSURE: 114 MMHG | OXYGEN SATURATION: 90 % | DIASTOLIC BLOOD PRESSURE: 62 MMHG | RESPIRATION RATE: 16 BRPM | TEMPERATURE: 99.4 F | WEIGHT: 190 LBS | BODY MASS INDEX: 28.06 KG/M2

## 2022-12-01 DIAGNOSIS — K04.7 DENTAL INFECTION: ICD-10-CM

## 2022-12-01 DIAGNOSIS — R22.0 SWELLING OF LEFT SIDE OF FACE: Primary | ICD-10-CM

## 2022-12-01 LAB
ANION GAP SERPL CALCULATED.3IONS-SCNC: 9 MMOL/L (ref 3–16)
ANISOCYTOSIS: ABNORMAL
BASOPHILS ABSOLUTE: 0 K/UL (ref 0–0.2)
BASOPHILS RELATIVE PERCENT: 0 %
BUN BLDV-MCNC: 6 MG/DL (ref 7–20)
CALCIUM SERPL-MCNC: 9.7 MG/DL (ref 8.3–10.6)
CHLORIDE BLD-SCNC: 103 MMOL/L (ref 99–110)
CO2: 26 MMOL/L (ref 21–32)
CREAT SERPL-MCNC: 0.6 MG/DL (ref 0.9–1.3)
EOSINOPHILS ABSOLUTE: 0 K/UL (ref 0–0.6)
EOSINOPHILS RELATIVE PERCENT: 0 %
GFR SERPL CREATININE-BSD FRML MDRD: >60 ML/MIN/{1.73_M2}
GLUCOSE BLD-MCNC: 94 MG/DL (ref 70–99)
HCT VFR BLD CALC: 23.1 % (ref 40.5–52.5)
HEMATOLOGY PATH CONSULT: NO
HEMOGLOBIN: 8.7 G/DL (ref 13.5–17.5)
IMMATURE RETIC FRACT: 0.71 (ref 0.21–0.37)
LYMPHOCYTES ABSOLUTE: 1.6 K/UL (ref 1–5.1)
LYMPHOCYTES RELATIVE PERCENT: 10 %
MACROCYTES: ABNORMAL
MCH RBC QN AUTO: 34.3 PG (ref 26–34)
MCHC RBC AUTO-ENTMCNC: 37.5 G/DL (ref 31–36)
MCV RBC AUTO: 91.4 FL (ref 80–100)
MICROCYTES: ABNORMAL
MONOCYTES ABSOLUTE: 2 K/UL (ref 0–1.3)
MONOCYTES RELATIVE PERCENT: 12 %
NEUTROPHILS ABSOLUTE: 12.8 K/UL (ref 1.7–7.7)
NEUTROPHILS RELATIVE PERCENT: 78 %
OVALOCYTES: ABNORMAL
PDW BLD-RTO: 23.8 % (ref 12.4–15.4)
PLATELET # BLD: 422 K/UL (ref 135–450)
PLATELET SLIDE REVIEW: ADEQUATE
PMV BLD AUTO: 8.1 FL (ref 5–10.5)
POIKILOCYTES: ABNORMAL
POLYCHROMASIA: ABNORMAL
POTASSIUM REFLEX MAGNESIUM: 4.4 MMOL/L (ref 3.5–5.1)
RBC # BLD: 2.53 M/UL (ref 4.2–5.9)
RETICULOCYTE ABSOLUTE COUNT: 0.33 M/UL
RETICULOCYTE COUNT PCT: 13.15 % (ref 0.5–2.18)
SCHISTOCYTES: ABNORMAL
SICKLE CELLS: ABNORMAL
SLIDE REVIEW: ABNORMAL
SODIUM BLD-SCNC: 138 MMOL/L (ref 136–145)
WBC # BLD: 16.4 K/UL (ref 4–11)

## 2022-12-01 PROCEDURE — 2580000003 HC RX 258: Performed by: PHYSICIAN ASSISTANT

## 2022-12-01 PROCEDURE — 2500000003 HC RX 250 WO HCPCS: Performed by: PHYSICIAN ASSISTANT

## 2022-12-01 PROCEDURE — 99284 EMERGENCY DEPT VISIT MOD MDM: CPT

## 2022-12-01 PROCEDURE — 96375 TX/PRO/DX INJ NEW DRUG ADDON: CPT

## 2022-12-01 PROCEDURE — 6360000002 HC RX W HCPCS: Performed by: PHYSICIAN ASSISTANT

## 2022-12-01 PROCEDURE — 96365 THER/PROPH/DIAG IV INF INIT: CPT

## 2022-12-01 PROCEDURE — 85045 AUTOMATED RETICULOCYTE COUNT: CPT

## 2022-12-01 PROCEDURE — 80048 BASIC METABOLIC PNL TOTAL CA: CPT

## 2022-12-01 PROCEDURE — 85025 COMPLETE CBC W/AUTO DIFF WBC: CPT

## 2022-12-01 RX ORDER — CLINDAMYCIN PHOSPHATE 600 MG/50ML
600 INJECTION INTRAVENOUS ONCE
Status: COMPLETED | OUTPATIENT
Start: 2022-12-01 | End: 2022-12-01

## 2022-12-01 RX ORDER — CLINDAMYCIN HYDROCHLORIDE 300 MG/1
300 CAPSULE ORAL 3 TIMES DAILY
Qty: 21 CAPSULE | Refills: 0 | Status: SHIPPED | OUTPATIENT
Start: 2022-12-01 | End: 2022-12-08

## 2022-12-01 RX ORDER — KETOROLAC TROMETHAMINE 30 MG/ML
30 INJECTION, SOLUTION INTRAMUSCULAR; INTRAVENOUS ONCE
Status: COMPLETED | OUTPATIENT
Start: 2022-12-01 | End: 2022-12-01

## 2022-12-01 RX ORDER — OXYCODONE AND ACETAMINOPHEN 7.5; 325 MG/1; MG/1
1 TABLET ORAL ONCE
Status: DISCONTINUED | OUTPATIENT
Start: 2022-12-01 | End: 2022-12-01

## 2022-12-01 RX ORDER — 0.9 % SODIUM CHLORIDE 0.9 %
1000 INTRAVENOUS SOLUTION INTRAVENOUS ONCE
Status: COMPLETED | OUTPATIENT
Start: 2022-12-01 | End: 2022-12-01

## 2022-12-01 RX ADMIN — SODIUM CHLORIDE 1000 ML: 9 INJECTION, SOLUTION INTRAVENOUS at 15:17

## 2022-12-01 RX ADMIN — CLINDAMYCIN PHOSPHATE 600 MG: 600 INJECTION, SOLUTION INTRAVENOUS at 16:17

## 2022-12-01 RX ADMIN — KETOROLAC TROMETHAMINE 30 MG: 30 INJECTION, SOLUTION INTRAMUSCULAR; INTRAVENOUS at 15:17

## 2022-12-01 ASSESSMENT — PAIN DESCRIPTION - LOCATION: LOCATION: FACE

## 2022-12-01 ASSESSMENT — PAIN DESCRIPTION - ORIENTATION: ORIENTATION: LEFT

## 2022-12-01 ASSESSMENT — PAIN - FUNCTIONAL ASSESSMENT
PAIN_FUNCTIONAL_ASSESSMENT: 0-10

## 2022-12-01 NOTE — ED NOTES
RN to bedside to provide discharge instructions. Patient states \"the last time this happened, I got clindamycin and when I went home I almost \". RN questioned patients comment. Patient states \"it just got worse, then when it happened again they admitted me\". RN explained that he had IV antibiotics in the ED and a prescription sent to his pharmacy for more. Patient still not satisfied with thought of discharge. RN spoke to Thompson Hardenma who states that the patient can take antibiotics this evening before bed and he can control his pain with his home pain medication that was filled on 2022. Patient stared at bed and did not speak to RN then shook his head and held out arm for RN to remove IV. While RN is removing IV, Ion LUA entered the room and spoke to patient about discharge instructions. Patient states \"it just don't matter man\". Ion Zapata again reinforced home instructions of taking antibiotics tonight before bed and taking home pain medication. Patient did not answer PA. RN asked if patient had any additional questions and went over discharge instructions. Patient agreeable to sign discharge paperwork. Patient then ambulated out of ED with steady gait and all belongings.      Jovanny Edmonds RN  [phone]

## 2022-12-01 NOTE — ED PROVIDER NOTES
[facility]  ED  EMERGENCY DEPARTMENT ENCOUNTER        Pt Name: Ajay Mendoza  MRN: [id number]  [place] 2000  Date of evaluation: 12/1/2022  Provider: Cydney Leos PA-C  PCP: Tracy Crowe MD  Note Started: 4:09 PM EST 12/1/22      SHONNA. I have evaluated this patient. My supervising physician was available for consultation. CHIEF COMPLAINT       Chief Complaint   Patient presents with    Facial Swelling     States L sided facial swelling since last night. States known bad teeth. HISTORY OF PRESENT ILLNESS   (Location, Timing/Onset, Context/Setting, Quality, Duration, Modifying Factors, Severity, Associated Signs and Symptoms)  Note limiting factors. Chief Complaint: Swelling left cheek and pain,  Bad teeth    Ajay Mendoza is a 20 y.o. male who presents the emergency department with the above complaint. States onset about 2 or 3 days ago with progressive swelling left cheek. Pain upper jaw area. He also gestures over the parotid TM joint. Patient states he is in pain. Patient with known sickle cell disease. He has been seen on a frequent basis. He does have a care plan limiting narcotics. Toradol approved. Patient was requesting Dilaudid. I did consider Dilaudid 1 mg IV however the patient has gotten this medication has a care plan. Patient is got Dilaudid when he has had priapism secondary to sickle cell disease. He has no priapism at this time. I did place order for Percocet 7.5/325. Nursing Notes were all reviewed and agreed with or any disagreements were addressed in the HPI. REVIEW OF SYSTEMS    (2-9 systems for level 4, 10 or more for level 5)     Review of Systems    Positives and Pertinent negatives as per HPI. Except as noted above in the ROS, all other systems were reviewed and negative.        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 ([facility]     Sickle cell anemia ([facility]          SURGICAL HISTORY     Past Surgical History:   Procedure Laterality Date    SPLENECTOMY           CURRENTMEDICATIONS       Discharge Medication List as of 12/1/2022  4:55 PM        CONTINUE these medications which have NOT CHANGED    Details   sildenafil (REVATIO) 20 MG tablet Take 1 tablet by mouth daily, Disp-30 tablet, R-0Normal      DULoxetine (CYMBALTA) 30 MG extended release capsule Historical Med      apixaban (ELIQUIS) 5 MG TABS tablet Take 1 tablet by mouth 2 times daily, Disp-60 tablet, R-0Normal      pantoprazole (PROTONIX) 40 MG tablet Take 1 tablet by mouth every morning (before breakfast), Disp-30 tablet, [id number]      folic acid (FOLVITE) 1 MG tablet Take 2 tablets by mouth daily, Disp-30 tablet, R-3Normal      oxyCODONE HCl (OXY-IR) 10 MG immediate release tablet Take 10 mg by mouth every 4 hours as needed for Pain. Historical Med      albuterol sulfate HFA (PROVENTIL;VENTOLIN;PROAIR) 108 (90 Base) MCG/ACT inhaler Albuterol HFA Inhaler 90 mcg/actuation  inhaled  2 puffs prn     ActiveHistorical Med      levalbuterol (XOPENEX) 0.31 MG/3ML nebulization Levalbuterol Nebulized    2 puffs prn     ActiveHistorical Med      docusate (COLACE, DULCOLAX) 100 MG CAPS Take 100 mg by mouthHistorical Med      ibuprofen (ADVIL;MOTRIN) 800 MG tablet ibuprofen 800 MG Oral Tablet  oral   prn    ActiveHistorical Med               ALLERGIES     Morphine    FAMILYHISTORY       Family History   Problem Relation Age of Onset    Other Father         sarcoidosis          SOCIAL HISTORY       Social History     Tobacco Use    Smoking status: Never    Smokeless tobacco: Never   Vaping Use    Vaping Use: Never used   Substance Use Topics    Alcohol use: Not Currently    Drug use: Never       SCREENINGS    [place] Coma Scale  Eye Opening:  To speech  Best Verbal Response: Oriented  Best Motor Response: Obeys commands  [place] Coma Scale Score: 14        PHYSICAL EXAM    (up to 7 for level 4, 8 or more for level 5)     ED Triage Vitals [12/01/22 1325]   BP Temp Temp Source Heart Rate Resp SpO2 Height Weight   134/63 99.4 °F (37.4 °C) Oral (!) 110 18 (!) 89 % -- 190 lb (86.2 kg)       Physical Exam  Vitals and nursing note reviewed. Constitutional:       Appearance: Normal appearance. He is well-developed and normal weight. HENT:      Head: Normocephalic and atraumatic. Right Ear: External ear normal.      Left Ear: External ear normal.      Mouth/Throat:      Comments: Patient has dental decay to the gumline involving tooth #19/20. He does have tenderness more posterior to this area. The cheek is slightly swollen. He does have tenderness of the TM joint. And not appreciating parotid enlargement. Perform an intraoral exam as well as external.  Eyes:      General: No scleral icterus. Right eye: No discharge. Left eye: No discharge. Conjunctiva/sclera: Conjunctivae normal.   Cardiovascular:      Rate and Rhythm: Normal rate and regular rhythm. Heart sounds: Normal heart sounds. Pulmonary:      Effort: Pulmonary effort is normal.      Breath sounds: Normal breath sounds. Abdominal:      General: Abdomen is flat. Bowel sounds are normal.      Palpations: Abdomen is soft. Tenderness: There is no abdominal tenderness. Musculoskeletal:         General: Normal range of motion. Cervical back: Normal range of motion and neck supple. Right lower leg: No edema. Left lower leg: No edema. Skin:     General: Skin is warm and dry. Neurological:      General: No focal deficit present. Mental Status: He is alert and oriented to person, place, and time. Mental status is at baseline. Psychiatric:         Mood and Affect: Mood normal.         Behavior: Behavior normal.         Thought Content:  Thought content normal.         Judgment: Judgment normal. DIAGNOSTIC RESULTS   LABS:    Labs Reviewed   RETICULOCYTES - Abnormal; Notable for the following components:       Result Value    Retic Ct Pct 13.15 (*)     Immature Retic Fract 0.71 (*)     Hematocrit 23.1 (*)     All other components within normal limits   BASIC METABOLIC PANEL W/ REFLEX TO MG FOR LOW K - Abnormal; Notable for the following components:    BUN 6 (*)     Creatinine 0.6 (*)     All other components within normal limits   CBC WITH AUTO DIFFERENTIAL - Abnormal; Notable for the following components:    WBC 16.4 (*)     RBC 2.53 (*)     Hemoglobin 8.7 (*)     MCH 34.3 (*)     MCHC 37.5 (*)     RDW 23.8 (*)     Neutrophils Absolute 12.8 (*)     Monocytes Absolute 2.0 (*)     Anisocytosis 1+ (*)     Macrocytes Occasional (*)     Microcytes Occasional (*)     Polychromasia Occasional (*)     Poikilocytes 2+ (*)     Schistocytes Occasional (*)     Ovalocytes 1+ (*)     Sickle Cells 3+ (*)     All other components within normal limits       When ordered only abnormal lab results are displayed. All other labs were within normal range or not returned as of this dictation. EKG: When ordered, EKG's are interpreted by the Emergency Department Physician in the absence of a cardiologist.  Please see their note for interpretation of EKG. RADIOLOGY:   Non-plain film images such as CT, Ultrasound and MRI are read by the radiologist. Plain radiographic images are visualized and preliminarily interpreted by the ED Provider with the below findings:        Interpretation per the Radiologist below, if available at the time of this note:    No orders to display     No results found.         PROCEDURES   Unless otherwise noted below, none     Procedures    CRITICAL CARE TIME       CONSULTS:  None      EMERGENCY DEPARTMENT COURSE and DIFFERENTIAL DIAGNOSIS/MDM:   Vitals:    Vitals:    12/01/22 1325 12/01/22 1328 12/01/22 1514 12/01/22 1646   BP: 134/63  128/72 114/62   Pulse: (!) 110  (!) 105 86   Resp: 18  14 16 Temp: 99.4 °F (37.4 °C)      TempSrc: Oral      SpO2: (!) 89% 95% 95% 90%   Weight: 190 lb (86.2 kg)          Patient was given the following medications:  Medications   0.9 % sodium chloride bolus (0 mLs IntraVENous Stopped 12/1/22 1646)   ketorolac (TORADOL) injection 30 mg (30 mg IntraVENous Given 12/1/22 1517)   clindamycin (CLEOCIN) 600 mg in dextrose 5 % 50 mL IVPB (0 mg IntraVENous Stopped 12/1/22 1710)         Is this patient to be included in the SEP-1 Core Measure due to severe sepsis or septic shock? No   Exclusion criteria - the patient is NOT to be included for SEP-1 Core Measure due to: Infection is not suspected    This patient presenting with swelling left face. He has poor dentition on the left looks like tooth #19 or 20. I find mild induration. I do not believe parotid swelling. TM joint mildly tender. I believe this to be an emerging dental infection as he had indicated to me in the beginning. He does have sickle cell disease. I did obtain reticulocyte, BMP and CBC. These are close to his previous baseline. He does see Dr. Lucas Diaz. The patient is under care plan at this facility. He has frequent visits with frequent Dilaudid administrations. The patient is prescribed oxycodone 10 mg by Dr. Lucas Diaz. Last filled 11/20/2022 #50. Should he be out of this medication so soon he is to contact Dr. Lucas Diaz. No narcotics at discharge. FINAL IMPRESSION      1. Swelling of left side of face    2.  Dental infection          DISPOSITION/PLAN   DISPOSITION Decision To Discharge 12/01/2022 04:50:51 PM      PATIENT REFERRED TO:  Your dentist    Schedule an appointment as soon as possible for a visit in 1 day      Wileen Schilder, [address] [facility]  [phone]    Schedule an appointment as soon as possible for a visit in 1 day      [facility]  ED  [address]  Go to   If symptoms worsen    DISCHARGE MEDICATIONS:  Discharge Medication List as of 12/1/2022  4:55 PM        START taking these medications    Details   clindamycin (CLEOCIN) 300 MG capsule Take 1 capsule by mouth 3 times daily for 7 days, Disp-21 capsule, R-0Normal             DISCONTINUED MEDICATIONS:  Discharge Medication List as of 12/1/2022  4:55 PM                 (Please note that portions of this note were completed with a voice recognition program.  Efforts were made to edit the dictations but occasionally words are mis-transcribed. )    Keon Resendez PA-C (electronically signed)           Keon Resendez PA-C  12/01/22 2696

## 2022-12-01 NOTE — ED NOTES
Patient states he is having 8/10 pain in face. Patient states he is concerned that the Toradol will not help and he will need \"to try something stronger\". Tima Aldana, [address] Arreaga aware.       Nile Costa RN  12/01/22 5177

## 2022-12-01 NOTE — DISCHARGE INSTRUCTIONS
I do see swelling in the left cheek area. This represents infectious process. While in emergency room IV was started. Clindamycin 600 mg IV given. I do understand sickle cell disease. You were given Toradol 30 mg IV. I do understand you have pain medication at home. Use your pain medication as prescribed by your healthcare provider. I did send prescription for clindamycin to your local [facility] in [facility]. On November 28, 2022 just a few days ago he received oxycodone IR 10 mg #50. He should have plenty of this medication at home for your pain control as needed. Also recommend continuation ibuprofen 600 mg 3 times daily along with Tylenol 1000 mg 3 times daily.

## 2022-12-01 NOTE — ED NOTES
Percocet not given at this time. Brought the medication to patient bedside however patient would not wake up long enough to take the medication. Patient resting comfortably at this time. No signs of distress noted. Medication will be placed back in [facility].       Terese Brunson RN  12/01/22 2101

## 2022-12-27 NOTE — PROGRESS NOTES
ONCOLOGY HEMATOLOGY CARE PROGRESS NOTE      SUBJECTIVE:       Feels slightly better. NO HA or blurry vision. NO chest pain or SOB. ROS:     Constitutional:  No weight loss, No fever, No chills, No night sweats. Energy level good. Eyes:  No impairment or change in vision  ENT / Mouth:  No pain, abnormal ulceration, bleeding, nasal drip or change in voice or hearing  Cardiovascular:  No chest pain, palpitations, new edema, or calf discomfort  Respiratory:  No pain, hemoptysis, change to breathing  Breast:  No pain, discharge, change in appearance or texture  Gastrointestinal:  No pain, cramping, jaundice, change to eating and bowel habits  Urinary:  No pain, bleeding or change in continence  Genitalia: No pain, bleeding or discharge  Musculoskeletal:  No redness, pain, edema or weakness  Skin:  No pruritus, rash, change to nodules or lesions  Neurologic:  No discomfort, change in mental status, speech, sensory or motor activity  Psychiatric:  No change in concentration or change to affect or mood  Endocrine:  No hot flashes, increased thirst, or change to urine production  Hematologic: No petechiae, ecchymosis or bleeding  Lymphatic:  No lymphadenopathy or lymphedema  Allergy / Immunologic:  No eczema, hives, frequent or recurrent infections    OBJECTIVE        Physical    VITALS:  BP (!) 93/55   Pulse 75   Temp 99.2 °F (37.3 °C) (Oral)   Resp 16   Ht 5' 8\" (1.727 m)   Wt 187 lb 14.4 oz (85.2 kg)   SpO2 97%   BMI 28.57 kg/m²   TEMPERATURE:  Current - Temp: 99.2 °F (37.3 °C);  Max - Temp  Av.3 °F (36.8 °C)  Min: 97.8 °F (36.6 °C)  Max: 99.2 °F (37.3 °C)  PULSE OXIMETRY RANGE: SpO2  Av.6 %  Min: 94 %  Max: 97 %  24HR INTAKE/OUTPUT:    Intake/Output Summary (Last 24 hours) at 2021 1109  Last data filed at 2021 0528  Gross per 24 hour   Intake 1323.17 ml   Output 2525 ml   Net -1201.83 ml       CONSTITUTIONAL: awake, alert, cooperative, no apparent distress   EYES: pupils equal, round and reactive to light, sclera clear and conjunctiva normal  ENT: Normocephalic, without obvious abnormality, atraumatic  NECK: supple, symmetrical, no jugular venous distension and no carotid bruits   HEMATOLOGIC/LYMPHATIC: no cervical, supraclavicular or axillary lymphadenopathy   LUNGS: no increased work of breathing and clear to auscultation   CARDIOVASCULAR: regular rate and rhythm, normal S1 and S2, no murmur noted  ABDOMEN: normal bowel sounds x 4, soft, non-distended, non-tender, no masses palpated, no hepatosplenomgaly   MUSCULOSKELETAL: full range of motion noted, tone is normal  NEUROLOGIC: awake, alert, oriented to name, place and time. Motor skills grossly intact. SKIN: Normal skin color, texture, turgor and no jaundice. appears intact   EXTREMITIES: no LE edema       Data      Recent Labs     08/30/21 2157 08/31/21  0547   WBC 19.2* 18.4*   HGB 9.7* 7.5*   HCT 28.6* 22.0*    175   .0* 106.5*        Recent Labs     08/30/21 2157 08/31/21  0547    140   K 4.3 4.1    108   CO2 24 23   PHOS  --  4.5   BUN 11 10   CREATININE 0.8* 0.7*     Recent Labs     08/30/21 2157   AST 38*   ALT 36   BILITOT 4.5*   ALKPHOS 190*       Magnesium:    Lab Results   Component Value Date    MG 1.80 05/05/2021    MG 1.80 05/04/2021    MG 1.90 01/12/2021         Problem List  Patient Active Problem List   Diagnosis    S[facility]    Asthma    Sickle cell crisis [facility]    Sepsis [facility]    Opiate overdose ([facility]    Opiate antagonist poisoning, accidental or unintentional, initial encounter    Leukocytosis    Hypoxia    Anemia    Other chest pain    Pneumonia due to infectious organism    Fever    Chronic prescription opiate use       ASSESSMENT AND PLAN:      He would like to stay until tomorrow. He has not called Dr. Sandy Agudelo ([facility]) from pain mgmt yet. He reports his phone being turned off.  I offered him the number for Dr. Shefali Crowell and gave him hospital phone to make arrangements. [facility] EMR reviewed and Javier told schedulers that he did not want to schedule with [address] since he would likely have to reduce his pain medications. Dr. Lj Solis to address.      Hydrea 1000 mg daily   Folate 2 mg daily   IV fluids stopped  S/p 1 unit PRBC yesterday, CBC pending   Refused red cell exchange for prolonged pain crisis         ONCOLOGIC DISPOSITION: DC tomorrow         Please contact through [address] Cephalexin Pregnancy And Lactation Text: This medication is Pregnancy Category B and considered safe during pregnancy.  It is also excreted in breast milk but can be used safely for shorter doses.

## 2023-01-07 NOTE — RT PROTOCOL NOTE
: [id number] Gerri (Ukranian )  Pt was upset that he was not discharge today(1/6) d/t delayed LifeVest fitting. Pt wanted to leave without LifeVest and was re-educated multiple times about the importance of going home with the LifeVest. Charge nurse was at bedside. Kiet from [facility] was called to clarify the reason for delay (issue with insurance) and informed charge nurse that they will re-schedule fittng tomorrow (1/7). Pt was informed and agreed, stated he will only stay till tomorrow.      RT Inhaler-Nebulizer Bronchodilator Protocol Note    There is a bronchodilator order in the chart from a provider indicating to follow the RT Bronchodilator Protocol and there is an Initiate RT Inhaler-Nebulizer Bronchodilator Protocol order as well (see protocol at bottom of note). CXR Findings:  No results found. The findings from the last RT Protocol Assessment were as follows:   History Pulmonary Disease: None or smoker <15 pack years  Respiratory Pattern: Regular pattern and RR 12-20 bpm  Breath Sounds: Clear breath sounds  Cough: Strong, spontaneous, non-productive  Indication for Bronchodilator Therapy:    Bronchodilator Assessment Score: 0    Aerosolized bronchodilator medication orders have been revised according to the RT Inhaler-Nebulizer Bronchodilator Protocol below. Respiratory Therapist to perform RT Therapy Protocol Assessment initially then follow the protocol. Repeat RT Therapy Protocol Assessment PRN for score 0-3 or on second treatment, BID, and PRN for scores above 3. No Indications - adjust the frequency to every 6 hours PRN wheezing or bronchospasm, if no treatments needed after 48 hours then discontinue using Per Protocol order mode. If indication present, adjust the RT bronchodilator orders based on the Bronchodilator Assessment Score as indicated below. Use Inhaler orders unless patient has one or more of the following: on home nebulizer, not able to hold breath for 10 seconds, is not alert and oriented, cannot activate and use MDI correctly, or respiratory rate 25 breaths per minute or more, then use the equivalent nebulizer order(s) with same Frequency and PRN reasons based on the score. If a patient is on this medication at home then do not decrease Frequency below that used at home.     0-3 - enter or revise RT bronchodilator order(s) to equivalent RT Bronchodilator order with Frequency of every 4 hours PRN for wheezing or increased work of breathing using Per Protocol order mode. 4-6 - enter or revise RT Bronchodilator order(s) to two equivalent RT bronchodilator orders with one order with BID Frequency and one order with Frequency of every 4 hours PRN wheezing or increased work of breathing using Per Protocol order mode. 7-10 - enter or revise RT Bronchodilator order(s) to two equivalent RT bronchodilator orders with one order with TID Frequency and one order with Frequency of every 4 hours PRN wheezing or increased work of breathing using Per Protocol order mode. 11-13 - enter or revise RT Bronchodilator order(s) to one equivalent RT bronchodilator order with QID Frequency and an Albuterol order with Frequency of every 4 hours PRN wheezing or increased work of breathing using Per Protocol order mode. Greater than 13 - enter or revise RT Bronchodilator order(s) to one equivalent RT bronchodilator order with every 4 hours Frequency and an Albuterol order with Frequency of every 2 hours PRN wheezing or increased work of breathing using Per Protocol order mode. RT to enter RT Home Evaluation for COPD & MDI Assessment order using Per Protocol order mode.     Electronically signed by Karlo Salazar RCP on 5/2/2022 at 4:45 AM

## 2023-01-11 ENCOUNTER — HOSPITAL ENCOUNTER (EMERGENCY)
Age: 23
Discharge: HOME | End: 2023-01-11
Attending: EMERGENCY MEDICINE
Payer: COMMERCIAL

## 2023-01-11 VITALS
SYSTOLIC BLOOD PRESSURE: 105 MMHG | RESPIRATION RATE: 20 BRPM | DIASTOLIC BLOOD PRESSURE: 62 MMHG | BODY MASS INDEX: 27.4 KG/M2 | TEMPERATURE: 98.2 F | OXYGEN SATURATION: 100 % | HEART RATE: 92 BPM | HEIGHT: 69 IN | WEIGHT: 185 LBS

## 2023-01-11 DIAGNOSIS — N48.30 PRIAPISM: Primary | ICD-10-CM

## 2023-01-11 LAB
A/G RATIO: 1.9 (ref 1.1–2.2)
ALBUMIN SERPL-MCNC: 4.7 G/DL (ref 3.4–5)
ALP BLD-CCNC: 86 U/L (ref 40–129)
ALT SERPL-CCNC: 17 U/L (ref 10–40)
ANION GAP SERPL CALCULATED.3IONS-SCNC: 12 MMOL/L (ref 3–16)
ANISOCYTOSIS: ABNORMAL
AST SERPL-CCNC: 39 U/L (ref 15–37)
BASOPHILS ABSOLUTE: 0.2 K/UL (ref 0–0.2)
BASOPHILS RELATIVE PERCENT: 1 %
BILIRUB SERPL-MCNC: 6.1 MG/DL (ref 0–1)
BUN BLDV-MCNC: 9 MG/DL (ref 7–20)
CALCIUM SERPL-MCNC: 9.6 MG/DL (ref 8.3–10.6)
CHLORIDE BLD-SCNC: 104 MMOL/L (ref 99–110)
CO2: 23 MMOL/L (ref 21–32)
CREAT SERPL-MCNC: 0.7 MG/DL (ref 0.9–1.3)
EOSINOPHILS ABSOLUTE: 0.2 K/UL (ref 0–0.6)
EOSINOPHILS RELATIVE PERCENT: 1.2 %
GFR SERPL CREATININE-BSD FRML MDRD: >60 ML/MIN/{1.73_M2}
GLUCOSE BLD-MCNC: 96 MG/DL (ref 70–99)
HCT VFR BLD CALC: 21.6 % (ref 40.5–52.5)
HEMATOLOGY PATH CONSULT: NO
HEMOGLOBIN: 7.8 G/DL (ref 13.5–17.5)
HYPOCHROMIA: ABNORMAL
IMMATURE RETIC FRACT: 0.68 (ref 0.21–0.37)
LYMPHOCYTES ABSOLUTE: 1.8 K/UL (ref 1–5.1)
LYMPHOCYTES RELATIVE PERCENT: 10.3 %
MACROCYTES: ABNORMAL
MCH RBC QN AUTO: 34.6 PG (ref 26–34)
MCHC RBC AUTO-ENTMCNC: 36 G/DL (ref 31–36)
MCV RBC AUTO: 96.2 FL (ref 80–100)
MICROCYTES: ABNORMAL
MONOCYTES ABSOLUTE: 1.4 K/UL (ref 0–1.3)
MONOCYTES RELATIVE PERCENT: 7.8 %
NEUTROPHILS ABSOLUTE: 13.8 K/UL (ref 1.7–7.7)
NEUTROPHILS RELATIVE PERCENT: 79.7 %
PDW BLD-RTO: 24.5 % (ref 12.4–15.4)
PLATELET # BLD: 368 K/UL (ref 135–450)
PLATELET SLIDE REVIEW: ADEQUATE
PMV BLD AUTO: 8.3 FL (ref 5–10.5)
POLYCHROMASIA: ABNORMAL
POTASSIUM REFLEX MAGNESIUM: 4.5 MMOL/L (ref 3.5–5.1)
RBC # BLD: 2.25 M/UL (ref 4.2–5.9)
RETICULOCYTE ABSOLUTE COUNT: 0.32 M/UL
RETICULOCYTE COUNT PCT: 14.04 % (ref 0.5–2.18)
SCHISTOCYTES: ABNORMAL
SICKLE CELLS: ABNORMAL
SLIDE REVIEW: ABNORMAL
SODIUM BLD-SCNC: 139 MMOL/L (ref 136–145)
SPECIMEN STATUS: NORMAL
TARGET CELLS: ABNORMAL
TOTAL PROTEIN: 7.2 G/DL (ref 6.4–8.2)
WBC # BLD: 17.4 K/UL (ref 4–11)

## 2023-01-11 PROCEDURE — 80053 COMPREHEN METABOLIC PANEL: CPT

## 2023-01-11 PROCEDURE — 99284 EMERGENCY DEPT VISIT MOD MDM: CPT

## 2023-01-11 PROCEDURE — A4216 STERILE WATER/SALINE, 10 ML: HCPCS | Performed by: EMERGENCY MEDICINE

## 2023-01-11 PROCEDURE — 6360000002 HC RX W HCPCS: Performed by: EMERGENCY MEDICINE

## 2023-01-11 PROCEDURE — 6370000000 HC RX 637 (ALT 250 FOR IP): Performed by: EMERGENCY MEDICINE

## 2023-01-11 PROCEDURE — 85025 COMPLETE CBC W/AUTO DIFF WBC: CPT

## 2023-01-11 PROCEDURE — 2580000003 HC RX 258: Performed by: EMERGENCY MEDICINE

## 2023-01-11 PROCEDURE — 85045 AUTOMATED RETICULOCYTE COUNT: CPT

## 2023-01-11 PROCEDURE — 96375 TX/PRO/DX INJ NEW DRUG ADDON: CPT

## 2023-01-11 PROCEDURE — 96374 THER/PROPH/DIAG INJ IV PUSH: CPT

## 2023-01-11 RX ORDER — FENTANYL CITRATE 50 UG/ML
50 INJECTION, SOLUTION INTRAMUSCULAR; INTRAVENOUS
Status: DISCONTINUED | OUTPATIENT
Start: 2023-01-11 | End: 2023-01-11 | Stop reason: HOSPADM

## 2023-01-11 RX ORDER — PHENYLEPHRINE HCL IN 0.9% NACL 1 MG/10 ML
1 SYRINGE (ML) INTRAVENOUS ONCE
Status: DISCONTINUED | OUTPATIENT
Start: 2023-01-11 | End: 2023-01-11 | Stop reason: HOSPADM

## 2023-01-11 RX ORDER — SODIUM CHLORIDE, SODIUM LACTATE, POTASSIUM CHLORIDE, AND CALCIUM CHLORIDE .6; .31; .03; .02 G/100ML; G/100ML; G/100ML; G/100ML
1000 INJECTION, SOLUTION INTRAVENOUS ONCE
Status: COMPLETED | OUTPATIENT
Start: 2023-01-11 | End: 2023-01-11

## 2023-01-11 RX ORDER — OXYCODONE HYDROCHLORIDE 5 MG/1
10 TABLET ORAL ONCE
Status: COMPLETED | OUTPATIENT
Start: 2023-01-11 | End: 2023-01-11

## 2023-01-11 RX ORDER — KETOROLAC TROMETHAMINE 30 MG/ML
30 INJECTION, SOLUTION INTRAMUSCULAR; INTRAVENOUS ONCE
Status: COMPLETED | OUTPATIENT
Start: 2023-01-11 | End: 2023-01-11

## 2023-01-11 RX ADMIN — KETOROLAC TROMETHAMINE 30 MG: 30 INJECTION, SOLUTION INTRAMUSCULAR at 13:09

## 2023-01-11 RX ADMIN — FENTANYL CITRATE 50 MCG: 0.05 INJECTION, SOLUTION INTRAMUSCULAR; INTRAVENOUS at 14:35

## 2023-01-11 RX ADMIN — FENTANYL CITRATE 50 MCG: 0.05 INJECTION, SOLUTION INTRAMUSCULAR; INTRAVENOUS at 14:10

## 2023-01-11 RX ADMIN — SODIUM CHLORIDE, POTASSIUM CHLORIDE, SODIUM LACTATE AND CALCIUM CHLORIDE 1000 ML: 600; 310; 30; 20 INJECTION, SOLUTION INTRAVENOUS at 13:10

## 2023-01-11 RX ADMIN — PHENYLEPHRINE HYDROCHLORIDE: 10 INJECTION INTRAVENOUS at 14:32

## 2023-01-11 RX ADMIN — OXYCODONE HYDROCHLORIDE 10 MG: 5 TABLET ORAL at 15:25

## 2023-01-11 ASSESSMENT — PAIN DESCRIPTION - LOCATION
LOCATION: GROIN
LOCATION: GROIN
LOCATION: GENERALIZED

## 2023-01-11 ASSESSMENT — PAIN DESCRIPTION - DESCRIPTORS
DESCRIPTORS: SHARP
DESCRIPTORS: SHARP

## 2023-01-11 ASSESSMENT — PAIN SCALES - GENERAL
PAINLEVEL_OUTOF10: 10
PAINLEVEL_OUTOF10: 8

## 2023-01-11 ASSESSMENT — PAIN - FUNCTIONAL ASSESSMENT: PAIN_FUNCTIONAL_ASSESSMENT: 0-10

## 2023-01-11 NOTE — CONSULTS
1253 - called [facility] per consult  Re: sickle cell priapism  1324 - 2nd called since no call back yet  1329 - [facility] called back to speak with Dr Ammon Pillai

## 2023-01-11 NOTE — ED NOTES
Pt placed on 2 L nasal cannula at this time per Dr Armond Schwab to assist in pain management.       Teresita Hidalgo RN  01/11/23 1578

## 2023-01-11 NOTE — ED NOTES
Pt states priapism has resolved at this time and is feeling better.      Kristan Crocker, SHAKEEL  01/11/23 4203

## 2023-01-11 NOTE — ED NOTES
Pt ambulatory with steady gait and without use of assistive devices.       Brigette Moon RN  01/11/23 6732

## 2023-01-11 NOTE — ED NOTES
--Patient provided with discharge instructions and any prescriptions. --Instructions, dosing, and follow-up appointments reviewed with patient/family. No further questions or needs at this time. --Vital signs and patient stable upon discharge. --Patient ambulatory to lobby with family.       Ty Hogan RN  01/11/23 5106

## 2023-01-11 NOTE — ED PROVIDER NOTES
Emergency Physician Note  [facility]  ED    Pt Name: Matthew Tierney  MRN: [id number]  [place] 2000  Date of evaluation: 1/11/2023  Provider: Lonnie Kumar MD  PCP: Ana Ocampo MD    Note Open Time: 3:14 PM EST 1/11/23    Chief Complaint  Groin Pain (Patient reports priapism since ~0800 when he woke up. Quiana Christinarry had to stick my groin last time\" hx of SCD, has a management plan.)       History of Present Illness  Matthew Tierney is a 20 y.o. male who presents to the ED for priapism. Patient reports he woke up around 8 AM with priapism. He states that he has had this before and usually has to have injections. He has a history of sickle cell disease but denies any other complaints. No fever or chest pain or other pains. He reports having urinated several times since the priapism occurred. History from : Patient    Limitations to history : None    REVIEW OF SYSTEMS :      Review of Systems    Positives and Pertinent negatives as per HPI. Medications/allergies/medical/social/family history  I have reviewed the following from the nursing documentation:      Prior to Admission medications    Medication Sig Start Date End Date Taking?  Authorizing Provider   sildenafil (REVATIO) 20 MG tablet Take 1 tablet by mouth daily 11/21/22 12/21/22  Pancho Carrington MD   sildenafil (REVATIO) 20 MG tablet Take 1 tablet by mouth daily 10/24/22 11/23/22  CARMELITA Sahu - CNP   DULoxetine (CYMBALTA) 30 MG extended release capsule  5/10/22   Historical Provider, MD   apixaban (ELIQUIS) 5 MG TABS tablet Take 1 tablet by mouth 2 times daily  Patient not taking: Reported on 8/27/2022 12/31/21   Mariam Marcano MD   pantoprazole (PROTONIX) 40 MG tablet Take 1 tablet by mouth every morning (before breakfast) 1/1/22   Mariam Marcano MD   folic acid (FOLVITE) 1 MG tablet Take 2 tablets by mouth daily 9/16/21   CARMELITA Mckee - CNP   oxyCODONE HCl (OXY-IR) 10 MG immediate release tablet Take 10 mg by mouth every 4 hours as needed for Pain.     Historical Provider, MD   albuterol sulfate HFA (PROVENTIL;VENTOLIN;PROAIR) 108 (90 Base) MCG/ACT inhaler Albuterol HFA Inhaler 90 mcg/actuation  inhaled  2 puffs prn     Active    Historical Provider, MD   levalbuterol (XOPENEX) 0.31 MG/3ML nebulization Levalbuterol Nebulized    2 puffs prn     Active    Historical Provider, MD   docusate (COLACE, DULCOLAX) 100 MG CAPS Take 100 mg by mouth  Patient not taking: Reported on 8/27/2022    Historical Provider, MD   ibuprofen (ADVIL;MOTRIN) 800 MG tablet ibuprofen 800 MG Oral Tablet  oral   prn    Active    Historical Provider, MD       Allergies as of 01/11/2023 - Fully Reviewed 01/11/2023   Allergen Reaction Noted    Morphine Shortness Of Breath 04/05/2013       Past Medical History:   Diagnosis Date    Accidental overdose     Asthma     DVT (deep venous thrombosis) ([facility] 10/19/2021    R leg    Enlarged heart     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)     Sickle cell anemia ([facility]         Surgical History:   Past Surgical History:   Procedure Laterality Date    SPLENECTOMY          Family History:    Family History   Problem Relation Age of Onset    Other Father         sarcoidosis       Social History     Socioeconomic History    Marital status: Single     Spouse name: Not on file    Number of children: 0    Years of education: Not on file    Highest education level: Not on file   Occupational History    Not on file   Tobacco Use    Smoking status: Never    Smokeless tobacco: Never   Vaping Use    Vaping Use: Never used   Substance and Sexual Activity    Alcohol use: Not Currently    Drug use: Never    Sexual activity: Not Currently   Other Topics Concern    Not on file   Social History Narrative    Not on file     Social Determinants of Health     Financial Resource Strain: Not on file   Food Insecurity: Not on file   Transportation Needs: Not on file   Physical Activity: Not on file   Stress: Not on file   Social Connections: Not on file   Intimate Partner Violence: Not on file   Housing Stability: Not on file       Nursing notes reviewed. Triage VS:  ED Triage Vitals [01/11/23 1205]   Enc Vitals Group      BP (!) 145/81      Heart Rate 88      Resp 22      Temp 98.2 °F (36.8 °C)      Temp Source Oral      SpO2 100 %      Weight 185 lb (83.9 kg)      Height 5' 9\" (1.753 m)      Head Circumference       Peak Flow       Pain Score       Pain Loc       Pain Edu? Excl. in [address]? GENERAL:  Awake, alert. Well developed, well nourished with no apparent distress. HENT:  Normocephalic, Atraumatic, moist mucous membranes. ABDOMEN:  Soft, non-tender, no rebound, rigidity or guarding, non-distended, normal bowel sounds. No costovertebral angle tenderness to palpation. BACK:  No tenderness. EXTREMITIES:  Normal range of motion, no edema, no bony tenderness, no deformity, distal pulses present. SKIN: Warm, dry and intact. NEUROLOGIC: Normal mental status. Moving all extremities to command. : Circumcised male genitalia with erection noted.     DIAGNOSTIC RESULTS   LABS:  Labs Reviewed   CBC WITH AUTO DIFFERENTIAL - Abnormal; Notable for the following components:       Result Value    WBC 17.4 (*)     RBC 2.25 (*)     Hemoglobin 7.8 (*)     Hematocrit 21.6 (*)     MCH 34.6 (*)     RDW 24.5 (*)     Neutrophils Absolute 13.8 (*)     Monocytes Absolute 1.4 (*)     Anisocytosis 3+ (*)     Macrocytes Occasional (*)     Microcytes Occasional (*)     Polychromasia 1+ (*)     Hypochromia Occasional (*)     Schistocytes Occasional (*)     Target Cells 1+ (*)     Sickle Cells 3+ (*)     All other components within normal limits   COMPREHENSIVE METABOLIC PANEL W/ REFLEX TO MG FOR LOW K - Abnormal; Notable for the following components:    Creatinine 0.7 (*)     Total Bilirubin 6.1 (*)     AST 39 (*)     All other components within normal limits   RETICULOCYTES - Abnormal; Notable for the following components: Retic Ct Pct 14.04 (*)     Immature Retic Fract 0.68 (*)     All other components within normal limits   SAMPLE POSSIBLE BLOOD BANK TESTING       When ordered only abnormal lab results are displayed. All other labs were within normal range or not returned as of this dictation. PROCEDURES  Procedures  Intracavernosal injection  After consulting with the patient's urologist, Dr. Sarai Smith, he recommended intracavernosal injection of phenylephrine. He recommended 500 mcg/mL 1 injection in one of the corpora cavernosum and the other injection to be given if no resolution. I initially injected the right and waited about 15 minutes with minimal improvement and definitely no resolution of the priapism. Second injection was performed on the left. Patient tolerated procedure both sides with moderate pain and only a drop of blood. Prior to this I performed a penile block with 4 cc of 0.5% bupivacaine and 2% lidocaine without epinephrine in a 50-50 mix injected in equal portion at the 10:00 and 2:00 positions. About 15 minutes after the second cavernosal injection patient had complete resolution of his priapism. 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        SCREENINGS          Blanchard Coma Scale  Eye Opening: Spontaneous  Best Verbal Response: Oriented  Best Motor Response: Obeys commands  Blade Coma Scale Score: 15                        CIWA Assessment  BP: 105/62  Heart Rate: 92               EMERGENCY DEPARTMENT COURSE and DIFFERENTIAL DIAGNOSIS/MDM:   ED medications:   ED Medication Orders (From admission, onward)      Start Ordered     Status Ordering Provider    01/11/23 1515 01/11/23 1514  oxyCODONE (ROXICODONE) immediate release tablet 10 mg  ONCE         Ordered ABDULAZIZ WOLF    01/11/23 1400 01/11/23 1348  phenylephrine (STEVE-SYNEPHRINE) 1 mg in sodium chloride (PF) 0.9 % 2 mL syringe  ONCE         Last MAR action: Given - by Marjorie Figueroa on 01/11/23 at [place] [id number]    01/11/23 1345 01/11/23 1332  phenylephrine 1 MG/10ML injection 1 mg  ONCE        Note to Pharmacy: Please prepare at higher concentration of 500mcg/ml, total of 1 mg. Intracavernosal injection for treatment of priapism. Last MAR action: Canceled Entry - by Marjorie Aggarwalon on 01/11/23 at [place] [address]    01/11/23 1331 01/11/23 1332  fentaNYL (SUBLIMAZE) injection 50 mcg  EVERY 1 HOUR PRN         Last MAR action: Given - by Marjorie Aggarwalon on 01/11/23 at [place] [address]    01/11/23 1300 01/11/23 1250  ketorolac (TORADOL) injection 30 mg  ONCE         Last MAR action: Given - by Marjorie Figueroa on 01/11/23 at 1309 ABDULAZIZ WOLF    01/11/23 1300 01/11/23 1250  lactated ringers bolus  ONCE         Last MAR action: Stopped - by Marjorie Figueroa on 01/11/23 at [address],[facility]:    Vitals:    01/11/23 1205 01/11/23 1414 01/11/23 1513   BP: (!) 145/81 (!) 146/60 105/62   Pulse: 88 97 92   Resp: 22 20 20   Temp: 98.2 °F (36.8 °C)     TempSrc: Oral     SpO2: 100% 100% 100%   Weight: 185 lb (83.9 kg)     Height: 5' 9\" (1.753 m)         Chronic Conditions: Sickle cell anemia      CONSULTS: (Who and What was discussed)  IP CONSULT TO UROLOGY    Discussion with Other Profesionals : Consultant urologist, Dr. Elizabeth Sanderson, we discussed treatment plan for priapism        Records Reviewed : Other treatment plans    Patient had resolution of his priapism with treatment received in the ER. I advised the patient to return to the emergency department immediately for any new or worsening symptoms, such as increased pain or swelling or any drainage from the penis. .   The patient voiced agreement and understanding of the treatment plan. I am the Primary Clinician of Record. FINAL IMPRESSION      1.  Priapism          DISPOSITION/PLAN     Pt is in stable condition upon Discharge to home. PATIENT REFERRED TO:  Rolo Ramsey MD  [address] [facility]  [phone]    Call   As needed    DISCHARGE MEDICATIONS:  Discharge Medication List as of 1/11/2023  3:17 PM          DISCONTINUED MEDICATIONS:  Discontinued Medications    No medications on file              (Please note that portions of this note were completed with a voice recognition program.  Efforts were made to edit the dictations but occasionally words are mis-transcribed.)    Kenny Harris MD (electronically signed)          This chart was generated using the [address] [place] [address] dictation system. I created this record but it may contain dictation errors.           Kenny Harris MD  01/11/23 2428

## 2023-01-15 ENCOUNTER — HOSPITAL ENCOUNTER (EMERGENCY)
Age: 23
Discharge: HOME | End: 2023-01-15
Attending: EMERGENCY MEDICINE
Payer: COMMERCIAL

## 2023-01-15 VITALS
SYSTOLIC BLOOD PRESSURE: 110 MMHG | OXYGEN SATURATION: 90 % | TEMPERATURE: 98.3 F | DIASTOLIC BLOOD PRESSURE: 54 MMHG | RESPIRATION RATE: 18 BRPM | HEART RATE: 94 BPM

## 2023-01-15 DIAGNOSIS — N48.32 PRIAPISM DUE TO SICKLE CELL DISEASE (HCC): Primary | ICD-10-CM

## 2023-01-15 DIAGNOSIS — D57.1 PRIAPISM DUE TO SICKLE CELL DISEASE (HCC): Primary | ICD-10-CM

## 2023-01-15 LAB
A/G RATIO: 1.6 (ref 1.1–2.2)
ALBUMIN SERPL-MCNC: 4.3 G/DL (ref 3.4–5)
ALP BLD-CCNC: 82 U/L (ref 40–129)
ALT SERPL-CCNC: 16 U/L (ref 10–40)
ANION GAP SERPL CALCULATED.3IONS-SCNC: 10 MMOL/L (ref 3–16)
ANISOCYTOSIS: ABNORMAL
AST SERPL-CCNC: 33 U/L (ref 15–37)
BANDED NEUTROPHILS RELATIVE PERCENT: 1 % (ref 0–7)
BASOPHILS ABSOLUTE: 0.1 K/UL (ref 0–0.2)
BASOPHILS RELATIVE PERCENT: 1 %
BILIRUB SERPL-MCNC: 5.9 MG/DL (ref 0–1)
BUN BLDV-MCNC: 9 MG/DL (ref 7–20)
CALCIUM SERPL-MCNC: 9.2 MG/DL (ref 8.3–10.6)
CHLORIDE BLD-SCNC: 101 MMOL/L (ref 99–110)
CO2: 24 MMOL/L (ref 21–32)
CREAT SERPL-MCNC: 0.8 MG/DL (ref 0.9–1.3)
EOSINOPHILS ABSOLUTE: 0 K/UL (ref 0–0.6)
EOSINOPHILS RELATIVE PERCENT: 0 %
GFR SERPL CREATININE-BSD FRML MDRD: >60 ML/MIN/{1.73_M2}
GLUCOSE BLD-MCNC: 95 MG/DL (ref 70–99)
HCT VFR BLD CALC: 23.3 % (ref 40.5–52.5)
HEMATOLOGY PATH CONSULT: NO
HEMOGLOBIN: 8.4 G/DL (ref 13.5–17.5)
IMMATURE RETIC FRACT: 0.71 (ref 0.21–0.37)
LYMPHOCYTES ABSOLUTE: 4.4 K/UL (ref 1–5.1)
LYMPHOCYTES RELATIVE PERCENT: 31 %
MCH RBC QN AUTO: 35.4 PG (ref 26–34)
MCHC RBC AUTO-ENTMCNC: 36.2 G/DL (ref 31–36)
MCV RBC AUTO: 97.8 FL (ref 80–100)
MONOCYTES ABSOLUTE: 1.8 K/UL (ref 0–1.3)
MONOCYTES RELATIVE PERCENT: 13 %
NEUTROPHILS ABSOLUTE: 7.8 K/UL (ref 1.7–7.7)
NEUTROPHILS RELATIVE PERCENT: 54 %
PDW BLD-RTO: 22.9 % (ref 12.4–15.4)
PLATELET # BLD: 429 K/UL (ref 135–450)
PLATELET SLIDE REVIEW: ADEQUATE
PMV BLD AUTO: 8.3 FL (ref 5–10.5)
POLYCHROMASIA: ABNORMAL
POTASSIUM REFLEX MAGNESIUM: 4 MMOL/L (ref 3.5–5.1)
RBC # BLD: 2.39 M/UL (ref 4.2–5.9)
RETICULOCYTE ABSOLUTE COUNT: 0.37 M/UL
RETICULOCYTE COUNT PCT: 15.38 % (ref 0.5–2.18)
SEDIMENTATION RATE, ERYTHROCYTE: <1 MM/HR (ref 0–15)
SICKLE CELLS: ABNORMAL
SLIDE REVIEW: ABNORMAL
SODIUM BLD-SCNC: 135 MMOL/L (ref 136–145)
TOTAL PROTEIN: 7 G/DL (ref 6.4–8.2)
WBC # BLD: 14.1 K/UL (ref 4–11)

## 2023-01-15 PROCEDURE — 2500000003 HC RX 250 WO HCPCS: Performed by: PHYSICIAN ASSISTANT

## 2023-01-15 PROCEDURE — 2580000003 HC RX 258: Performed by: PHYSICIAN ASSISTANT

## 2023-01-15 PROCEDURE — 85025 COMPLETE CBC W/AUTO DIFF WBC: CPT

## 2023-01-15 PROCEDURE — 96375 TX/PRO/DX INJ NEW DRUG ADDON: CPT

## 2023-01-15 PROCEDURE — 85045 AUTOMATED RETICULOCYTE COUNT: CPT

## 2023-01-15 PROCEDURE — 96374 THER/PROPH/DIAG INJ IV PUSH: CPT

## 2023-01-15 PROCEDURE — 85652 RBC SED RATE AUTOMATED: CPT

## 2023-01-15 PROCEDURE — 6360000002 HC RX W HCPCS: Performed by: PHYSICIAN ASSISTANT

## 2023-01-15 PROCEDURE — 99284 EMERGENCY DEPT VISIT MOD MDM: CPT

## 2023-01-15 PROCEDURE — 80053 COMPREHEN METABOLIC PANEL: CPT

## 2023-01-15 RX ORDER — SODIUM CHLORIDE, SODIUM LACTATE, POTASSIUM CHLORIDE, CALCIUM CHLORIDE 600; 310; 30; 20 MG/100ML; MG/100ML; MG/100ML; MG/100ML
1000 INJECTION, SOLUTION INTRAVENOUS CONTINUOUS
Status: DISCONTINUED | OUTPATIENT
Start: 2023-01-15 | End: 2023-01-15 | Stop reason: HOSPADM

## 2023-01-15 RX ORDER — PHENYLEPHRINE HCL IN 0.9% NACL 1 MG/10 ML
1 SYRINGE (ML) INTRAVENOUS ONCE
Status: COMPLETED | OUTPATIENT
Start: 2023-01-15 | End: 2023-01-15

## 2023-01-15 RX ORDER — KETOROLAC TROMETHAMINE 30 MG/ML
15 INJECTION, SOLUTION INTRAMUSCULAR; INTRAVENOUS ONCE
Status: DISCONTINUED | OUTPATIENT
Start: 2023-01-15 | End: 2023-01-15

## 2023-01-15 RX ORDER — FENTANYL CITRATE 50 UG/ML
50 INJECTION, SOLUTION INTRAMUSCULAR; INTRAVENOUS ONCE
Status: COMPLETED | OUTPATIENT
Start: 2023-01-15 | End: 2023-01-15

## 2023-01-15 RX ORDER — KETOROLAC TROMETHAMINE 30 MG/ML
15 INJECTION, SOLUTION INTRAMUSCULAR; INTRAVENOUS ONCE
Status: COMPLETED | OUTPATIENT
Start: 2023-01-15 | End: 2023-01-15

## 2023-01-15 RX ADMIN — SODIUM CHLORIDE, POTASSIUM CHLORIDE, SODIUM LACTATE AND CALCIUM CHLORIDE 1000 ML: 600; 310; 30; 20 INJECTION, SOLUTION INTRAVENOUS at 08:49

## 2023-01-15 RX ADMIN — FENTANYL CITRATE 50 MCG: 0.05 INJECTION, SOLUTION INTRAMUSCULAR; INTRAVENOUS at 08:50

## 2023-01-15 RX ADMIN — KETOROLAC TROMETHAMINE 15 MG: 30 INJECTION, SOLUTION INTRAMUSCULAR at 08:50

## 2023-01-15 RX ADMIN — Medication 1 MG: at 09:30

## 2023-01-15 ASSESSMENT — ENCOUNTER SYMPTOMS
DIARRHEA: 0
CONSTIPATION: 0
COUGH: 0
CHEST TIGHTNESS: 0
SORE THROAT: 0
SHORTNESS OF BREATH: 0
NAUSEA: 0
RHINORRHEA: 0
SINUS PAIN: 0
EYE DISCHARGE: 0
VOMITING: 0
ABDOMINAL PAIN: 0
EYE REDNESS: 0
SINUS PRESSURE: 0

## 2023-01-15 ASSESSMENT — PAIN SCALES - GENERAL
PAINLEVEL_OUTOF10: 10
PAINLEVEL_OUTOF10: 10
PAINLEVEL_OUTOF10: 5

## 2023-01-15 ASSESSMENT — PAIN DESCRIPTION - LOCATION: LOCATION: PENIS

## 2023-01-15 ASSESSMENT — PAIN - FUNCTIONAL ASSESSMENT: PAIN_FUNCTIONAL_ASSESSMENT: 0-10

## 2023-01-15 NOTE — ED PROVIDER NOTES
[address] [facility]  ED  EMERGENCY DEPARTMENT ENCOUNTER        Pt Name: Kang Chong  MRN: [id number]  [place] 2000  Date of evaluation: 1/15/2023  Provider: Ren Srivastava PA-C  PCP: Rafiq Hull MD  Note Started: 8:46 AM EST 1/15/23       I have seen and evaluated this patient with my supervising physician Jasmyne Martinez, [address] [facility]       Chief Complaint   Patient presents with    Penis Pain     Hx of sickle cell, priapism started 2 hours pta,        HISTORY OF PRESENT ILLNESS: 1 or more Elements     History from : Patient    Limitations to history : None    Kang Chong is a 20 y.o. male who presents via EMS with a complaint of a painful erection that started 2 hours prior to arrival patient indicates 10 out of 10 pain at this time. Patient is not taking prescribed silenafil. No tx pta. Has not followed up with urology     Nursing Notes were all reviewed and agreed with or any disagreements were addressed in the HPI. REVIEW OF SYSTEMS :      Review of Systems   Constitutional:  Negative for chills and fever. HENT: Negative. Negative for congestion, rhinorrhea, sinus pressure, sinus pain and sore throat. Eyes:  Negative for discharge, redness and visual disturbance. Respiratory:  Negative for cough, chest tightness and shortness of breath. Cardiovascular:  Negative for chest pain and palpitations. Gastrointestinal:  Negative for abdominal pain, constipation, diarrhea, nausea and vomiting. Genitourinary:  Negative for difficulty urinating, dysuria and frequency. Musculoskeletal: Negative. Skin: Negative. Neurological: Negative. Negative for dizziness, weakness, numbness and headaches. Psychiatric/Behavioral: Negative. All other systems reviewed and are negative. Positives and Pertinent negatives as per HPI.      SURGICAL HISTORY     Past Surgical History:   Procedure Laterality Date    SPLENECTOMY         CURRENTMEDICATIONS     Discharge Medication List as of 1/15/2023 10:07 AM        CONTINUE these medications which have NOT CHANGED    Details   sildenafil (REVATIO) 20 MG tablet Take 1 tablet by mouth daily, Disp-30 tablet, R-0Normal      sildenafil (REVATIO) 20 MG tablet Take 1 tablet by mouth daily, Disp-30 tablet, R-0Normal      DULoxetine (CYMBALTA) 30 MG extended release capsule Historical Med      apixaban (ELIQUIS) 5 MG TABS tablet Take 1 tablet by mouth 2 times daily, Disp-60 tablet, R-0Normal      pantoprazole (PROTONIX) 40 MG tablet Take 1 tablet by mouth every morning (before breakfast), Disp-30 tablet, R-0Normal      folic acid (FOLVITE) 1 MG tablet Take 2 tablets by mouth daily, Disp-30 tablet, R-3Normal      oxyCODONE HCl (OXY-IR) 10 MG immediate release tablet Take 10 mg by mouth every 4 hours as needed for Pain.Historical Med      albuterol sulfate HFA (PROVENTIL;VENTOLIN;PROAIR) 108 (90 Base) MCG/ACT inhaler Albuterol HFA Inhaler 90 mcg/actuation  inhaled  2 puffs prn     ActiveHistorical Med      levalbuterol (XOPENEX) 0.31 MG/3ML nebulization Levalbuterol Nebulized    2 puffs prn     ActiveHistorical Med      docusate (COLACE, DULCOLAX) 100 MG CAPS Take 100 mg by mouthHistorical Med      ibuprofen (ADVIL;MOTRIN) 800 MG tablet ibuprofen 800 MG Oral Tablet  oral   prn    ActiveHistorical Med             ALLERGIES     Morphine    FAMILYHISTORY       Family History   Problem Relation Age of Onset    Other Father         sarcoidosis        SOCIAL HISTORY       Social History     Tobacco Use    Smoking status: Never    Smokeless tobacco: Never   Vaping Use    Vaping Use: Never used   Substance Use Topics    Alcohol use: Not Currently    Drug use: Never       SCREENINGS                         CIWA Assessment  BP: (!) 110/54  Heart Rate: 94           PHYSICAL EXAM  1 or more Elements     ED Triage Vitals [01/15/23 0837]   BP Temp Temp Source Heart Rate Resp SpO2 Height Weight   (!) 140/63 98.3 °F (36.8 °C) Oral 94 18 91 %  -- --       Physical Exam  Vitals and nursing note reviewed. Exam conducted with a chaperone present. Constitutional:       General: He is in acute distress. Appearance: Normal appearance. He is well-developed. He is not diaphoretic. HENT:      Head: Normocephalic and atraumatic. Nose: Nose normal.      Mouth/Throat:      Mouth: Mucous membranes are moist.      Pharynx: No oropharyngeal exudate. Eyes:      General:         Right eye: No discharge. Left eye: No discharge. Extraocular Movements: Extraocular movements intact. Conjunctiva/sclera: Conjunctivae normal.      Pupils: Pupils are equal, round, and reactive to light. Cardiovascular:      Rate and Rhythm: Normal rate and regular rhythm. Heart sounds: Normal heart sounds. No murmur heard. No friction rub. No gallop. Pulmonary:      Effort: Pulmonary effort is normal. No respiratory distress. Breath sounds: Normal breath sounds. No wheezing. Abdominal:      General: Bowel sounds are normal. There is no distension. Palpations: Abdomen is soft. Tenderness: There is no abdominal tenderness. Genitourinary:     Penis: Circumcised. Tenderness and swelling present. Musculoskeletal:         General: Normal range of motion. Cervical back: Normal range of motion. Skin:     General: Skin is warm and dry. Capillary Refill: Capillary refill takes less than 2 seconds. Neurological:      General: No focal deficit present. Mental Status: He is alert.    Psychiatric:         Mood and Affect: Mood normal.         Behavior: Behavior normal.           DIAGNOSTIC RESULTS   LABS:    Labs Reviewed   CBC WITH AUTO DIFFERENTIAL - Abnormal; Notable for the following components:       Result Value    WBC 14.1 (*)     RBC 2.39 (*)     Hemoglobin 8.4 (*)     Hematocrit 23.3 (*)     MCH 35.4 (*)     MCHC 36.2 (*)     RDW 22.9 (*)     Neutrophils Absolute 7.8 (*)     Monocytes Absolute 1.8 (*) Anisocytosis 2+ (*)     Polychromasia 1+ (*)     Sickle Cells 2+ (*)     All other components within normal limits   COMPREHENSIVE METABOLIC PANEL W/ REFLEX TO MG FOR LOW K - Abnormal; Notable for the following components:    Sodium 135 (*)     Creatinine 0.8 (*)     Total Bilirubin 5.9 (*)     All other components within normal limits   RETICULOCYTES - Abnormal; Notable for the following components:    Retic Ct Pct 15.38 (*)     Immature Retic Fract 0.71 (*)     All other components within normal limits   SEDIMENTATION RATE     When ordered only abnormal lab results are displayed. All other labs were within normal range or not returned as of this dictation. PROCEDURES   Procedure supervised by attending ED Provider, Dr Missy Bryan. Penile Block and then Intracavernosal injection    After evaluation of past ED providers note, performed intracavernosal injection of phenylephrine. 500 mcg/mL 1 injection in one of the corpora cavernosum and the other injection to be given if no resolution. I initially injected the right and waited about 15 minutes definitely resolution of the priapism. Second injection was not required. Ace wrap placed on penile shaft. Patient tolerated procedure both sides with moderate pain and only a drop of blood. Prior to this I performed a penile block with 4 cc of 0.5% bupivacaine and 2% lidocaine without epinephrine in a 50-50 mix injected in equal portion at the 10:00 and 2:00 positions. About 15 minutes after the cavernosal injection patient had complete resolution of his priapism. 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      Chronic Conditions affecting Care: See above    EMERGENCY DEPARTMENT COURSE and DIFFERENTIAL DIAGNOSIS/MDM:   Vitals:    Vitals:    01/15/23 3380 01/15/23 8921 01/15/23 7658 BP: (!) 140/63 (!) 110/58 (!) 110/54   Pulse: 94     Resp: 18     Temp: 98.3 °F (36.8 °C)     TempSrc: Oral     SpO2: 91% 90%        Patient was given the following medications:  Medications   lactated ringers infusion 1,000 mL (0 mLs IntraVENous Stopped 1/15/23 1008)   ketorolac (TORADOL) injection 15 mg (15 mg IntraVENous Given 1/15/23 0850)   fentaNYL (SUBLIMAZE) injection 50 mcg (50 mcg IntraVENous Given 1/15/23 0850)   phenylephrine 1 MG/10ML injection 1 mg (1 mg IntraVENous Given 1/15/23 0930)       ED Course as of 01/15/23 1126   Sun Payam 15, 2023   0950 Re-evaluation pt with his pants on, calling his father, advised he feels better   [AR]      ED Course User Index  [AR] Brooksie Boxer, PA-C        Is this patient to be included in the SEP-1 Core Measure due to severe sepsis or septic shock? No   Exclusion criteria - the patient is NOT to be included for SEP-1 Core Measure due to:  2+ SIRS criteria are not met        CC/HPI Summary, DDx, ED Course, and Reassessment: Nontoxic afebrile patient presents to the ED for evaluation in acute distress on initial presentation. Immediately evaluated the patient and provided him with Toradol per evaluation of patient's care plan in addition to dose of fentanyl and IV fluids. Labs are evaluated which are consistent with patient's baseline. Patient is provided with a penile block and then intracavernosal injection of phenylephrine. Patient had complete resolution of his symptoms and feels much better on reevaluation. He is comfortable with being discharged into the care of family at this time. We did emphasize close follow-up with urology and compliance with taking medications as prescribed. I am the Primary Clinician of Record. FINAL IMPRESSION      1.  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  Efforts were made to edit the dictations but occasionally words are mis-transcribed.)    Rashaad Jauregui PA-C (electronically signed)           Rashaad Jauregui PA-C  01/15/23 1126

## 2023-01-15 NOTE — DISCHARGE INSTRUCTIONS
Recommend taking sildenafil as prescribed  Please follow up with Urology  Return for new or worse symptoms

## 2023-01-15 NOTE — ED PROVIDER NOTES
Emergency Department Attending Provider Note  Location: [facility]  ED  1/15/2023     Chief Complaint   Patient presents with    Penis Pain     Hx of sickle cell, priapism started 2 hours pta,         PATIENT ID:  Carla Brown is a 20 y.o. 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 Supposed to follow-up with Dr. Lorraine Salvador, urology. Recently here on 1/11/2023, required penile block and phenylephrine injection with achievement of detumescence in the emergency department. Patient has a care management plan, limiting opiates, given Toradol, and follow-up with urology. Up to this point, STONEY Renteria has provided fentanyl, Toradol, and has ordered the phenylephrine. We will perform penile block as below. Patient arrives emergency department quite uncomfortable, but is in no clinical cardiopulmonary distress. Denies any chest pain or shortness of breath. Vitals stable on arrival.      Although initial history and physical exam information was obtained by STONEY Renteria (who also dictated a record of this visit), I personally saw the patient and performed a substantive portion of the visit including all aspects of the medical decision making.       No orders to display       Labs Reviewed   CBC WITH AUTO DIFFERENTIAL - Abnormal; Notable for the following components:       Result Value    WBC 14.1 (*)     RBC 2.39 (*)     Hemoglobin 8.4 (*)     Hematocrit 23.3 (*)     MCH 35.4 (*)     MCHC 36.2 (*)     RDW 22.9 (*)     Neutrophils Absolute 7.8 (*)     Monocytes Absolute 1.8 (*)     Anisocytosis 2+ (*)     Polychromasia 1+ (*) Sickle Cells 2+ (*)     All other components within normal limits   COMPREHENSIVE METABOLIC PANEL W/ REFLEX TO MG FOR LOW K - Abnormal; Notable for the following components:    Sodium 135 (*)     Creatinine 0.8 (*)     Total Bilirubin 5.9 (*)     All other components within normal limits   RETICULOCYTES - Abnormal; Notable for the following components:    Retic Ct Pct 15.38 (*)     Immature Retic Fract 0.71 (*)     All other components within normal limits   SEDIMENTATION RATE         PLAN:   -Patient seen and evaluated. Relevant records reviewed. In the emergency department today with priapism, and a penile block with bupivacaine lidocaine, and phenylephrine injection were performed today in the emergency department. Further, urology was consulted per NP documentation. I asked patient if he is taking the Viagra or any other medications he has been described, and he says his parents do not want him taking Viagra. I do discussed with him that I can prescribe this, he is 20years old and he can obtain it without his parental consent, but he says transportation is an issue. I tried to offer to send it to our pharmacy here in the emergency department to have them deliver it, or even he can pick it up at a later date, and he does not wish for this to happen. He says he would rather just follow-up with urology. Was present for the entire duration of the penile block and the phenylephrine injection, I applied Ace bandage status post intracavernosal injection. In regards to a sickle cell disease, he is having no chest pain or shortness of breath. Laboratory evaluation today is largely unchanged from his baseline's. His retake count is slightly higher than usual, but says he has to make an appoint with hematology. Phenylephrine injection results and detumescence, and on reevaluation, patient has actually already called his dad and put his pants back on, stating he is ready to go.   Discharged home in stable condition per NP documentation. Medications   lactated ringers infusion 1,000 mL (1,000 mLs IntraVENous New Bag 1/15/23 0849)   phenylephrine 1 MG/10ML injection 1 mg (has no administration in time range)   ketorolac (TORADOL) injection 15 mg (15 mg IntraVENous Given 1/15/23 0850)   fentaNYL (SUBLIMAZE) injection 50 mcg (50 mcg IntraVENous Given 1/15/23 0850)         IMPRESSION:    ICD-10-CM    1. 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  D57.1     N48.32             I, Nimisha Henderson DO am the primary clinician of record. I personally saw the patient and I independently provided 32 minutes of non-concurrent critical care out of the total shared critical care time provided. This chart was generated in part by using Dragon Dictation system and may contain errors related to that system including errors in grammar, punctuation, and spelling, as well as words and phrases that may be inappropriate. If there are any questions or concerns please feel free to contact the dictating provider for clarification.      NIMISHA HENDERSON DO  [address], DO  01/16/23 [address], DO  01/16/23 3489

## 2023-01-28 ENCOUNTER — HOSPITAL ENCOUNTER (EMERGENCY)
Age: 23
Discharge: HOME | End: 2023-01-28
Attending: EMERGENCY MEDICINE
Payer: COMMERCIAL

## 2023-01-28 VITALS
BODY MASS INDEX: 28.14 KG/M2 | DIASTOLIC BLOOD PRESSURE: 58 MMHG | RESPIRATION RATE: 18 BRPM | TEMPERATURE: 97.8 F | HEIGHT: 69 IN | OXYGEN SATURATION: 98 % | HEART RATE: 69 BPM | WEIGHT: 190 LBS | SYSTOLIC BLOOD PRESSURE: 118 MMHG

## 2023-01-28 DIAGNOSIS — D57.00 SICKLE CELL DISEASE WITH CRISIS (HCC): ICD-10-CM

## 2023-01-28 DIAGNOSIS — N48.30 PRIAPISM: Primary | ICD-10-CM

## 2023-01-28 DIAGNOSIS — E80.6 HYPERBILIRUBINEMIA: ICD-10-CM

## 2023-01-28 LAB
A/G RATIO: 2.1 (ref 1.1–2.2)
ALBUMIN SERPL-MCNC: 4.7 G/DL (ref 3.4–5)
ALP BLD-CCNC: 72 U/L (ref 40–129)
ALT SERPL-CCNC: 15 U/L (ref 10–40)
ANION GAP SERPL CALCULATED.3IONS-SCNC: 11 MMOL/L (ref 3–16)
ANISOCYTOSIS: ABNORMAL
AST SERPL-CCNC: 36 U/L (ref 15–37)
ATYPICAL LYMPHOCYTE RELATIVE PERCENT: 4 % (ref 0–6)
BANDED NEUTROPHILS RELATIVE PERCENT: 6 % (ref 0–7)
BASOPHILS ABSOLUTE: 0 K/UL (ref 0–0.2)
BASOPHILS RELATIVE PERCENT: 0 %
BILIRUB SERPL-MCNC: 7 MG/DL (ref 0–1)
BUN BLDV-MCNC: 10 MG/DL (ref 7–20)
CALCIUM SERPL-MCNC: 9.4 MG/DL (ref 8.3–10.6)
CHLORIDE BLD-SCNC: 106 MMOL/L (ref 99–110)
CO2: 23 MMOL/L (ref 21–32)
CREAT SERPL-MCNC: 0.6 MG/DL (ref 0.9–1.3)
EOSINOPHILS ABSOLUTE: 0.4 K/UL (ref 0–0.6)
EOSINOPHILS RELATIVE PERCENT: 3 %
GFR SERPL CREATININE-BSD FRML MDRD: >60 ML/MIN/{1.73_M2}
GLUCOSE BLD-MCNC: 99 MG/DL (ref 70–99)
HCT VFR BLD CALC: 22.1 % (ref 40.5–52.5)
HEMATOLOGY PATH CONSULT: NO
HEMOGLOBIN: 7.7 G/DL (ref 13.5–17.5)
HYPOCHROMIA: ABNORMAL
IMMATURE RETIC FRACT: 0.68 (ref 0.21–0.37)
LYMPHOCYTES ABSOLUTE: 2.9 K/UL (ref 1–5.1)
LYMPHOCYTES RELATIVE PERCENT: 17 %
MACROCYTES: ABNORMAL
MCH RBC QN AUTO: 34.2 PG (ref 26–34)
MCHC RBC AUTO-ENTMCNC: 34.9 G/DL (ref 31–36)
MCV RBC AUTO: 98.1 FL (ref 80–100)
MICROCYTES: ABNORMAL
MONOCYTES ABSOLUTE: 1.3 K/UL (ref 0–1.3)
MONOCYTES RELATIVE PERCENT: 9 %
NEUTROPHILS ABSOLUTE: 9.4 K/UL (ref 1.7–7.7)
NEUTROPHILS RELATIVE PERCENT: 61 %
NUCLEATED RED BLOOD CELLS: 3 /100 WBC
PDW BLD-RTO: 24.5 % (ref 12.4–15.4)
PLATELET # BLD: 373 K/UL (ref 135–450)
PMV BLD AUTO: 8.6 FL (ref 5–10.5)
POIKILOCYTES: ABNORMAL
POLYCHROMASIA: ABNORMAL
POTASSIUM SERPL-SCNC: 3.9 MMOL/L (ref 3.5–5.1)
RBC # BLD: 2.25 M/UL (ref 4.2–5.9)
RETICULOCYTE ABSOLUTE COUNT: 0.37 M/UL
RETICULOCYTE COUNT PCT: 16.67 % (ref 0.5–2.18)
SICKLE CELLS: ABNORMAL
SODIUM BLD-SCNC: 140 MMOL/L (ref 136–145)
TARGET CELLS: ABNORMAL
TOTAL PROTEIN: 6.9 G/DL (ref 6.4–8.2)
WBC # BLD: 14 K/UL (ref 4–11)

## 2023-01-28 PROCEDURE — 99284 EMERGENCY DEPT VISIT MOD MDM: CPT

## 2023-01-28 PROCEDURE — 2500000003 HC RX 250 WO HCPCS: Performed by: EMERGENCY MEDICINE

## 2023-01-28 PROCEDURE — 6360000002 HC RX W HCPCS: Performed by: EMERGENCY MEDICINE

## 2023-01-28 PROCEDURE — 96376 TX/PRO/DX INJ SAME DRUG ADON: CPT

## 2023-01-28 PROCEDURE — 96375 TX/PRO/DX INJ NEW DRUG ADDON: CPT

## 2023-01-28 PROCEDURE — 80053 COMPREHEN METABOLIC PANEL: CPT

## 2023-01-28 PROCEDURE — 2580000003 HC RX 258: Performed by: EMERGENCY MEDICINE

## 2023-01-28 PROCEDURE — 85045 AUTOMATED RETICULOCYTE COUNT: CPT

## 2023-01-28 PROCEDURE — 6370000000 HC RX 637 (ALT 250 FOR IP): Performed by: EMERGENCY MEDICINE

## 2023-01-28 PROCEDURE — 54161 CIRCUM 28 DAYS OR OLDER: CPT

## 2023-01-28 PROCEDURE — 96374 THER/PROPH/DIAG INJ IV PUSH: CPT

## 2023-01-28 PROCEDURE — 85025 COMPLETE CBC W/AUTO DIFF WBC: CPT

## 2023-01-28 RX ORDER — PHENYLEPHRINE HCL IN 0.9% NACL 1 MG/10 ML
1 SYRINGE (ML) INTRAVENOUS ONCE
Status: COMPLETED | OUTPATIENT
Start: 2023-01-28 | End: 2023-01-28

## 2023-01-28 RX ORDER — 0.9 % SODIUM CHLORIDE 0.9 %
500 INTRAVENOUS SOLUTION INTRAVENOUS ONCE
Status: COMPLETED | OUTPATIENT
Start: 2023-01-28 | End: 2023-01-28

## 2023-01-28 RX ORDER — OXYCODONE HYDROCHLORIDE 5 MG/1
10 TABLET ORAL ONCE
Status: DISCONTINUED | OUTPATIENT
Start: 2023-01-28 | End: 2023-01-28

## 2023-01-28 RX ORDER — OXYCODONE HYDROCHLORIDE 5 MG/1
5 TABLET ORAL ONCE
Status: COMPLETED | OUTPATIENT
Start: 2023-01-28 | End: 2023-01-28

## 2023-01-28 RX ORDER — ONDANSETRON 2 MG/ML
4 INJECTION INTRAMUSCULAR; INTRAVENOUS ONCE
Status: COMPLETED | OUTPATIENT
Start: 2023-01-28 | End: 2023-01-28

## 2023-01-28 RX ADMIN — SODIUM CHLORIDE 500 ML: 9 INJECTION, SOLUTION INTRAVENOUS at 11:32

## 2023-01-28 RX ADMIN — ONDANSETRON 4 MG: 2 INJECTION INTRAMUSCULAR; INTRAVENOUS at 10:36

## 2023-01-28 RX ADMIN — Medication 0.2 MG: at 11:55

## 2023-01-28 RX ADMIN — HYDROMORPHONE HYDROCHLORIDE 1 MG: 1 INJECTION, SOLUTION INTRAMUSCULAR; INTRAVENOUS; SUBCUTANEOUS at 10:37

## 2023-01-28 RX ADMIN — HYDROMORPHONE HYDROCHLORIDE 0.5 MG: 0.5 INJECTION, SOLUTION INTRAMUSCULAR; INTRAVENOUS; SUBCUTANEOUS at 11:33

## 2023-01-28 RX ADMIN — SODIUM CHLORIDE 500 ML: 9 INJECTION, SOLUTION INTRAVENOUS at 10:35

## 2023-01-28 RX ADMIN — OXYCODONE HYDROCHLORIDE 5 MG: 5 TABLET ORAL at 13:12

## 2023-01-28 ASSESSMENT — PAIN DESCRIPTION - LOCATION
LOCATION: GENERALIZED
LOCATION: PENIS

## 2023-01-28 ASSESSMENT — PAIN DESCRIPTION - FREQUENCY: FREQUENCY: CONTINUOUS

## 2023-01-28 ASSESSMENT — PAIN DESCRIPTION - ORIENTATION: ORIENTATION: OTHER (COMMENT)

## 2023-01-28 ASSESSMENT — PAIN DESCRIPTION - ONSET: ONSET: ON-GOING

## 2023-01-28 ASSESSMENT — PAIN - FUNCTIONAL ASSESSMENT: PAIN_FUNCTIONAL_ASSESSMENT: 0-10

## 2023-01-28 ASSESSMENT — PAIN SCALES - GENERAL
PAINLEVEL_OUTOF10: 8
PAINLEVEL_OUTOF10: 7
PAINLEVEL_OUTOF10: 10
PAINLEVEL_OUTOF10: 9
PAINLEVEL_OUTOF10: 8

## 2023-01-28 ASSESSMENT — ENCOUNTER SYMPTOMS
SHORTNESS OF BREATH: 0
VOMITING: 0
ABDOMINAL PAIN: 0
NAUSEA: 1
COLOR CHANGE: 0

## 2023-01-28 ASSESSMENT — PAIN DESCRIPTION - DESCRIPTORS: DESCRIPTORS: ACHING

## 2023-01-28 NOTE — CONSULTS
1243 - PS to Dr Gamaliel Cantu at [facility]  Re: sickle cell  [phone] - Dr Gamaliel Cantu called back to speak with Dr Isaiah Burden

## 2023-01-28 NOTE — ED NOTES
Pt SpO2 decreased to 87% on room air, pt states this happens sometimes after receiving pain medication. Placed on nasal cannula at 2 L/min, now 96%.      Rosalino Nettles RN  01/28/23 1122

## 2023-01-28 NOTE — Clinical Note
Mariah Cueto was seen and treated in our emergency department on 1/28/2023. He may return to work on 01/30/2023. If you have any questions or concerns, please don't hesitate to call.       Rayna Singer MD

## 2023-01-28 NOTE — ED PROVIDER NOTES
[facility]  ED  EMERGENCY DEPARTMENT ENCOUNTER        Pt Name: Cece Dyer  MRN: [id number]  [place] 2000  Date of evaluation: 1/28/2023  Provider: Dillan Mcknight MD  PCP: Harrison Flores MD      CHIEF COMPLAINT       Chief Complaint   Patient presents with    Male  Problem     Pt experiencing priapism       HISTORY OFPRESENT ILLNESS   (Location/Symptom, Timing/Onset, Context/Setting, Quality, Duration, Modifying Factors,Severity)  Note limiting factors. Cece Dyer is a 20 y.o. male presenting today due to concern for waking up this morning around 6 AM feeling like he had his return of priapism that seemed to improve initially until around 7:30 AM when it came back and has been constant since. He tried running up and down the stairs seeing if that would help but states it was persistent so he came by EMS for further assistance. He does take 10 to 20 mg oxycodone at home per dose and does report having pain medication at home. He denies any falls or trauma. He is not on any blood thinners. He denies any chest pain or shortness of breath. No pain in the fingers. No abdominal pain or vomiting. Due to concern for penile pain and erection, he came to the emergency department for further evaluation. He has had the exact same thing happened multiple times in the past and is supposed to see urology but has missed some follow-up appointments. REVIEW OF SYSTEMS    (2-9 systems for level 4, 10 or more for level 5)     Review of Systems   Constitutional:  Negative for chills and fever. Respiratory:  Negative for shortness of breath. Cardiovascular:  Negative for chest pain. Gastrointestinal:  Positive for nausea. Negative for abdominal pain and vomiting. Genitourinary:  Positive for penile pain. Negative for testicular pain. Skin:  Negative for color change and wound. Neurological:  Negative for weakness and headaches.    Psychiatric/Behavioral: The patient is nervous/anxious. Positives and Pertinent negatives as per HPI. PASTMEDICAL HISTORY     Past Medical History:   Diagnosis Date    Accidental overdose     Asthma     DVT (deep venous thrombosis) (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)     Sickle cell anemia (HCC)          SURGICAL HISTORY       Past Surgical History:   Procedure Laterality Date    SPLENECTOMY           CURRENT MEDICATIONS       Discharge Medication List as of 1/28/2023  1:08 PM        CONTINUE these medications which have NOT CHANGED    Details   sildenafil (REVATIO) 20 MG tablet Take 1 tablet by mouth daily, Disp-30 tablet, R-0Normal      sildenafil (REVATIO) 20 MG tablet Take 1 tablet by mouth daily, Disp-30 tablet, R-0Normal      DULoxetine (CYMBALTA) 30 MG extended release capsule Historical Med      apixaban (ELIQUIS) 5 MG TABS tablet Take 1 tablet by mouth 2 times daily, Disp-60 tablet, R-0Normal      pantoprazole (PROTONIX) 40 MG tablet Take 1 tablet by mouth every morning (before breakfast), Disp-30 tablet, [id number]      folic acid (FOLVITE) 1 MG tablet Take 2 tablets by mouth daily, Disp-30 tablet, R-3Normal      oxyCODONE HCl (OXY-IR) 10 MG immediate release tablet Take 10 mg by mouth every 4 hours as needed for Pain. Historical Med      albuterol sulfate HFA (PROVENTIL;VENTOLIN;PROAIR) 108 (90 Base) MCG/ACT inhaler Albuterol HFA Inhaler 90 mcg/actuation  inhaled  2 puffs prn     ActiveHistorical Med      levalbuterol (XOPENEX) 0.31 MG/3ML nebulization Levalbuterol Nebulized    2 puffs prn     ActiveHistorical Med      docusate (COLACE, DULCOLAX) 100 MG CAPS Take 100 mg by mouthHistorical Med      ibuprofen (ADVIL;MOTRIN) 800 MG tablet ibuprofen 800 MG Oral Tablet  oral   prn    ActiveHistorical Med             ALLERGIES     Morphine    FAMILY HISTORY       Family History   Problem Relation Age of Onset    Other Father         sarcoidosis          SOCIAL HISTORY Social History     Socioeconomic History    Marital status: Single     Spouse name: None    Number of children: 0    Years of education: None    Highest education level: None   Tobacco Use    Smoking status: Never    Smokeless tobacco: Never   Vaping Use    Vaping Use: Never used   Substance and Sexual Activity    Alcohol use: Not Currently    Drug use: Never    Sexual activity: Not Currently       SCREENINGS    [place] Coma Scale  Eye Opening: Spontaneous  Best Verbal Response: Oriented  Best Motor Response: Obeys commands  [place] Coma Scale Score: 15           PHYSICAL EXAM    (up to 7 for level 4, 8 or more for level 5)     ED Triage Vitals   BP Temp Temp src Pulse Resp SpO2 Height Weight   -- -- -- -- -- -- -- --       Physical Exam  Vitals and nursing note reviewed. Exam conducted with a chaperone present Payam Thapa RN for penile exams (multiple times for eval, reevals, and procedure)). Constitutional:       General: He is awake. He is in acute distress (mild). Appearance: Normal appearance. He is well-developed, well-groomed and overweight. He is not ill-appearing, toxic-appearing or diaphoretic. HENT:      Head: Normocephalic and atraumatic. Right Ear: External ear normal.      Left Ear: External ear normal.      Mouth/Throat:      Mouth: Mucous membranes are moist.   Cardiovascular:      Rate and Rhythm: Normal rate. Pulmonary:      Effort: Pulmonary effort is normal. No respiratory distress. Breath sounds: No stridor. Abdominal:      General: Abdomen is flat. Bowel sounds are normal. There is no distension. Palpations: Abdomen is soft. Tenderness: There is no abdominal tenderness. There is no guarding or rebound. Genitourinary:     Penis: Tenderness (priapism noted) and swelling present. No paraphimosis, erythema, discharge or lesions. Musculoskeletal:         General: No deformity. Cervical back: Neck supple. No rigidity.    Skin:     General: Skin is warm and dry.      Coloration: Skin is not ashen, cyanotic or pale. Findings: No abrasion, abscess, bruising, ecchymosis, erythema, signs of injury, laceration or wound. Neurological:      General: No focal deficit present. Mental Status: He is alert and oriented to person, place, and time. Mental status is at baseline. Motor: No weakness. Psychiatric:         Attention and Perception: Attention normal.         Mood and Affect: Affect normal. Mood is anxious. Speech: Speech normal.         Behavior: Behavior normal. Behavior is cooperative. DIAGNOSTIC RESULTS   :    Labs Reviewed   CBC WITH AUTO DIFFERENTIAL - Abnormal; Notable for the following components:       Result Value    WBC 14.0 (*)     RBC 2.25 (*)     Hemoglobin 7.7 (*)     Hematocrit 22.1 (*)     MCH 34.2 (*)     RDW 24.5 (*)     Neutrophils Absolute 9.4 (*)     nRBC 3 (*)     Anisocytosis 2+ (*)     Macrocytes 1+ (*)     Microcytes Occasional (*)     Polychromasia 1+ (*)     Hypochromia Occasional (*)     Poikilocytes 2+ (*)     Target Cells 1+ (*)     Sickle Cells 2+ (*)     All other components within normal limits   COMPREHENSIVE METABOLIC PANEL - Abnormal; Notable for the following components:    Creatinine 0.6 (*)     Total Bilirubin 7.0 (*)     All other components within normal limits   RETICULOCYTES - Abnormal; Notable for the following components:    Retic Ct Pct 16.67 (*)     Immature Retic Fract 0.68 (*)     All other components within normal limits       All other labs were within normal range or not returned asof this dictation. EKG:  All EKG's are interpreted by the Emergency Department Physician who either signs or Co-signs this chart in the absence of a cardiologist.        RADIOLOGY:   Non-plain film images such as CT, Ultrasound and MRI are read by the radiologist. Plainradiographic images are visualized and preliminarily interpreted by the  ED Provider with the belowfindings:        Interpretation per the Radiologist below, if available at the time of this note:    No orders to display         PROCEDURES   Unless otherwise noted below, none     Procedure: Penile block along with administration of phenylephrine for detumescence  Indication: Priapism due to sickle cell  The patient gave verbal consent for procedure understanding there is risk for bleeding, infection, worsening of condition, not adequate relief of pain. I did use Betadine to sterilize the penis and surrounding groin tissue. Following this, 1% lidocaine without epinephrine was injected to the 12 o'clock position of the penis superficially at the site of the skin and then I aspirated as I injected at the 10 o'clock position in the 2 o'clock position of the penis. The 2 o'clock position of the penis had good anesthesia but the 10 o'clock position need 1 additional injection. Following this, I did inject 100 mcg phenylephrine at the 2 o'clock position after aspirating blood and then did the same thing at the 10 o'clock position after aspirating blood. I rechecked the patient 15 minutes after procedure and at that point he was having much improvement of his symptoms and the priapism was mostly resolved. He tolerated the procedure well with good improvement of his erection. Procedures    CRITICAL CARE TIME   Critical Care Time:    I personally saw the patient and independently provided 45 minutes of non-concurrent critical care out of the total shared critical care time provided. Includes repeat examinations, speaking with consultants, lab interpretation, charting, repeat penile examinations to ensure improvement  Excludes separate billable procedures. Patient at risk for serious decompensation if not treated for this life-threatening/penis-threatening condition.             CONSULTS: Spoke with Dr. Isaiah Barnett and he reported that there is no need to try to aspirate blood with him being such a chronic issue but recommended phenylephrine 100 mcg injection to the corpus cavernosum with bilateral injections to ensure improvement. Spoke with Dr. Jenifer Lemus who will let Dr. Clifton Tolbert know but no other recommendations at this point it is okay to discharge. IP CONSULT TO UROLOGY  IP CONSULT TO HEM/ONC    EMERGENCY DEPARTMENT COURSE and DIFFERENTIAL DIAGNOSIS/MDM:   Vitals:    Vitals:    01/28/23 1008 01/28/23 1125 01/28/23 1240   BP: (!) 149/91 (!) 111/59 (!) 118/58   Pulse: 93 71 69   Resp: 28 14 18   Temp: 97.8 °F (36.6 °C)     TempSrc: Oral     SpO2: 97% 99% 98%   Weight: 190 lb (86.2 kg)     Height: 5' 9\" (1.753 m)         Patient was given the following medications:  Medications   HYDROmorphone (DILAUDID) injection 1 mg (1 mg IntraVENous Given 1/28/23 1037)   ondansetron (ZOFRAN) injection 4 mg (4 mg IntraVENous Given 1/28/23 1036)   0.9 % sodium chloride bolus (0 mLs IntraVENous Stopped 1/28/23 1130)   HYDROmorphone (DILAUDID) injection 0.5 mg (0.5 mg IntraVENous Given 1/28/23 1133)   0.9 % sodium chloride bolus (0 mLs IntraVENous Stopped 1/28/23 1236)   phenylephrine 1 MG/10ML injection 1 mg (0.2 mg IntraVENous Give PPD 1/28/23 1155)   oxyCODONE (ROXICODONE) immediate release tablet 5 mg (5 mg Oral Given 1/28/23 1312)     Patient was evaluated due to concern for priapism with similar episodes in the past related to sickle cell disease. His lab work did show anemia which is a chronic issue. No concern for cellulitis or infection of the penis at this time based on exam.  He denies any chest pain and I do not suspect acute chest syndrome related to sickle cell. He denied any concerns for any finger pain or extremity pain. No concern for blood clots at this time. Abdominal exam was benign and I do not suspect bowel infarction. He ultimately did not have improvement with initial Dilaudid and ice and therefore I did speak to urology who recommended phenylephrine and ultimately this was performed.   He did have improvement of the erection following this and was feeling better. Nurse chaperone was always available in the room whenever I was in the room doing penile exams. He knows to return to the emergency department for any return of priapism/erection but otherwise make sure to follow-up urgently with urology for further assessment and follow-up with hematology over the next few days for any other concerns with his chronic pain. He was well-appearing and in no acute distress at time of discharge and felt comfortable with this plan. Since he reported that his penis finally looked and felt back to normal upon repeat assessment prior to discharge, I do not believe he needs to be admitted and observed overnight but did give strict return precautions to the ED over the next 3 to 12 hours if anything worsens. I was the primary provider for the patient. The patient tolerated their visit well. The patient and / or the family were informed of the results of any tests, a time was given to answer questions. FINAL IMPRESSION      1. Priapism    2. Sickle cell disease with crisis ([address]    3.  Hyperbilirubinemia          DISPOSITION/PLAN   DISPOSITION Decision To Discharge 01/28/2023 01:05:07 PM-Discharged in improved, stable condition      PATIENT REFERRED TO:  [facility]  ED  [address]  Go to   If symptoms worsen    Martinez Hoff MD  [address] [place] [address]  [phone]    Schedule an appointment as soon as possible for a visit in 2 days      Tomy Buenrostro MD  [address] [place] [address]  [phone]    Schedule an appointment as soon as possible for a visit in 3 days      DISCHARGEMEDICATIONS:  Discharge Medication List as of 1/28/2023  1:08 PM          DISCONTINUED MEDICATIONS:  Discharge Medication List as of 1/28/2023  1:08 PM                 (Please note that portions of this note were completed with a voicerecognition program.  Efforts were made to edit the dictations but occasionally words are mis-transcribed.)    Maciej Sepulveda MD (electronically signed)            Maciej Sepulveda MD  01/28/23 9855

## 2023-01-28 NOTE — ED NOTES
States priapism is resolved.  Dr. Cagle Gave notified      Pierre Bhatia, RN  01/28/23 [phone]

## 2023-01-28 NOTE — DISCHARGE INSTRUCTIONS
Call urology on Monday to make urgent appointment for further recommendations due to your history of multiple episodes of priapism. Call Dr. Shanon Barbosa on Monday as well to see about further evaluation of your sickle cell disease and determine if any other treatments can be done to avoid this. If you develop any worsening penile pain or swelling over the next 3 to 12 hours with persistent erection, new onset abdominal pain with vomiting, new chest pain with shortness of breath, strokelike symptoms, or any other concerns, then return to the emergency department immediately for further evaluation. Take your pain medication at home as prescribed.

## 2023-01-28 NOTE — CONSULTS
1036 - PS to Dr Agustin Ashford at [facility]   Re: priapism  1107 - 2nd page to Dr Agustin Ashford since no call back yet  1115 - Dr Agustin Ashford called back to speak with Dr Thea Hoffmann

## 2023-02-04 ENCOUNTER — HOSPITAL ENCOUNTER (EMERGENCY)
Age: 23
Discharge: HOME | End: 2023-02-04
Attending: EMERGENCY MEDICINE
Payer: COMMERCIAL

## 2023-02-04 VITALS
RESPIRATION RATE: 17 BRPM | WEIGHT: 190 LBS | OXYGEN SATURATION: 92 % | HEART RATE: 89 BPM | SYSTOLIC BLOOD PRESSURE: 108 MMHG | BODY MASS INDEX: 28.06 KG/M2 | DIASTOLIC BLOOD PRESSURE: 58 MMHG | TEMPERATURE: 98.4 F

## 2023-02-04 DIAGNOSIS — D57.1 PRIAPISM DUE TO SICKLE CELL DISEASE (HCC): Primary | ICD-10-CM

## 2023-02-04 DIAGNOSIS — N48.32 PRIAPISM DUE TO SICKLE CELL DISEASE (HCC): Primary | ICD-10-CM

## 2023-02-04 PROCEDURE — 99284 EMERGENCY DEPT VISIT MOD MDM: CPT

## 2023-02-04 PROCEDURE — 96372 THER/PROPH/DIAG INJ SC/IM: CPT

## 2023-02-04 PROCEDURE — 2500000003 HC RX 250 WO HCPCS: Performed by: EMERGENCY MEDICINE

## 2023-02-04 PROCEDURE — 6360000002 HC RX W HCPCS: Performed by: PHYSICIAN ASSISTANT

## 2023-02-04 RX ORDER — PHENYLEPHRINE HCL IN 0.9% NACL 1 MG/10 ML
1 SYRINGE (ML) INTRAVENOUS ONCE
Status: COMPLETED | OUTPATIENT
Start: 2023-02-04 | End: 2023-02-04

## 2023-02-04 RX ORDER — KETOROLAC TROMETHAMINE 30 MG/ML
15 INJECTION, SOLUTION INTRAMUSCULAR; INTRAVENOUS ONCE
Status: DISCONTINUED | OUTPATIENT
Start: 2023-02-04 | End: 2023-02-04

## 2023-02-04 RX ORDER — PHENYLEPHRINE HCL IN 0.9% NACL 1 MG/10 ML
1 SYRINGE (ML) INTRAVENOUS ONCE
Status: DISCONTINUED | OUTPATIENT
Start: 2023-02-04 | End: 2023-02-04

## 2023-02-04 RX ORDER — ONDANSETRON 2 MG/ML
4 INJECTION INTRAMUSCULAR; INTRAVENOUS ONCE
Status: DISCONTINUED | OUTPATIENT
Start: 2023-02-04 | End: 2023-02-04

## 2023-02-04 RX ORDER — FENTANYL CITRATE 50 UG/ML
25 INJECTION, SOLUTION INTRAMUSCULAR; INTRAVENOUS ONCE
Status: DISCONTINUED | OUTPATIENT
Start: 2023-02-04 | End: 2023-02-04

## 2023-02-04 RX ADMIN — Medication 1 MG: at 09:45

## 2023-02-04 RX ADMIN — HYDROMORPHONE HYDROCHLORIDE 1 MG: 1 INJECTION, SOLUTION INTRAMUSCULAR; INTRAVENOUS; SUBCUTANEOUS at 09:30

## 2023-02-04 ASSESSMENT — ENCOUNTER SYMPTOMS
EYE REDNESS: 0
SORE THROAT: 0
SINUS PRESSURE: 0
NAUSEA: 0
VOMITING: 0
SINUS PAIN: 0
CONSTIPATION: 0
SHORTNESS OF BREATH: 0
RHINORRHEA: 0
COUGH: 0
DIARRHEA: 0
CHEST TIGHTNESS: 0
EYE DISCHARGE: 0
ABDOMINAL PAIN: 0

## 2023-02-04 ASSESSMENT — PAIN SCALES - GENERAL
PAINLEVEL_OUTOF10: 8
PAINLEVEL_OUTOF10: 3
PAINLEVEL_OUTOF10: 9

## 2023-02-04 ASSESSMENT — PAIN - FUNCTIONAL ASSESSMENT: PAIN_FUNCTIONAL_ASSESSMENT: 0-10

## 2023-02-04 NOTE — ED PROVIDER NOTES
I independently examined and evaluated Cynthia Benson. In brief, ***. Focused exam revealed ***.    ED course: ***    All diagnostic, treatment, and disposition decisions were made by myself in conjunction with the advanced practice provider/resident physician. I personally saw the patient and performed a substantive portion of the visit including aspects of the medical decision making. ***I personally saw the patient and independently provided ***minutes of non-concurrent critical care out of the total shared critical care time provided. Comment: Please note this report has been produced using speech recognition software and may contain errors related to that system including errors in grammar, punctuation, and spelling, as well as words and phrases that may be inappropriate. If there are any questions or concerns please feel free to contact the dictating provider for clarification. For all further details of the patient's emergency department visit, please see the advanced practice provider's documentation.

## 2023-02-04 NOTE — ED PROVIDER NOTES
[facility]  ED  EMERGENCY DEPARTMENT ENCOUNTER        Pt Name: Sherle Councilman  MRN: [id number]  [place] 2000  Date of evaluation: 2/4/2023  Provider: Gabrielle Dietrich PA-C  PCP: Jaqueline Sexton MD  Note Started: 9:10 AM EST 2/4/23       I have seen and evaluated this patient with my supervising physician Cindy Baxter MD.      [address] [place] [address]       Chief Complaint   Patient presents with    Penis Pain     Patient woke up today around 0600 with erect penis. History of priapism. HISTORY OF PRESENT ILLNESS: 1 or more Elements     History from : Patient    Limitations to history : None    Sherle Councilman is a 20 y.o. male who presents to the ED with a complaint of a 3-hour history of an erect painful penis, 10/10. Patient indicates this is consistent with his baseline episodes of priapism. Patient has a history of sickle cell disease. He indicates that he did not have any pain medication prior to coming into the ER. Does not take anything for symptoms. Not tried anything to help resolve his symptoms. He indicates that he has painful daily erections daily, however often times they do not resolve on their own. He is not taking, viagra or ciallis, as he advised cost being a barrier to him obtaining this medication. Nursing Notes were all reviewed and agreed with or any disagreements were addressed in the HPI. REVIEW OF SYSTEMS :      Review of Systems   Constitutional:  Negative for chills and fever. HENT: Negative. Negative for congestion, rhinorrhea, sinus pressure, sinus pain and sore throat. Eyes:  Negative for discharge, redness and visual disturbance. Respiratory:  Negative for cough, chest tightness and shortness of breath. Cardiovascular:  Negative for chest pain and palpitations. Gastrointestinal:  Negative for abdominal pain, constipation, diarrhea, nausea and vomiting. Genitourinary:  Positive for penile pain and penile swelling.  Negative for difficulty urinating, dysuria, frequency, penile discharge and scrotal swelling. Musculoskeletal: Negative. Skin: Negative. Neurological: Negative. Negative for dizziness, weakness, numbness and headaches. Psychiatric/Behavioral: Negative. All other systems reviewed and are negative. Positives and Pertinent negatives as per HPI. SURGICAL HISTORY     Past Surgical History:   Procedure Laterality Date    SPLENECTOMY         CURRENTMEDICATIONS       Discharge Medication List as of 2/4/2023 10:15 AM        CONTINUE these medications which have NOT CHANGED    Details   sildenafil (REVATIO) 20 MG tablet Take 1 tablet by mouth daily, Disp-30 tablet, R-0Normal      sildenafil (REVATIO) 20 MG tablet Take 1 tablet by mouth daily, Disp-30 tablet, R-0Normal      DULoxetine (CYMBALTA) 30 MG extended release capsule Historical Med      apixaban (ELIQUIS) 5 MG TABS tablet Take 1 tablet by mouth 2 times daily, Disp-60 tablet, R-0Normal      pantoprazole (PROTONIX) 40 MG tablet Take 1 tablet by mouth every morning (before breakfast), Disp-30 tablet, [id number]      folic acid (FOLVITE) 1 MG tablet Take 2 tablets by mouth daily, Disp-30 tablet, R-3Normal      oxyCODONE HCl (OXY-IR) 10 MG immediate release tablet Take 10 mg by mouth every 4 hours as needed for Pain. Historical Med      albuterol sulfate HFA (PROVENTIL;VENTOLIN;PROAIR) 108 (90 Base) MCG/ACT inhaler Albuterol HFA Inhaler 90 mcg/actuation  inhaled  2 puffs prn     ActiveHistorical Med      levalbuterol (XOPENEX) 0.31 MG/3ML nebulization Levalbuterol Nebulized    2 puffs prn     ActiveHistorical Med      docusate (COLACE, DULCOLAX) 100 MG CAPS Take 100 mg by mouthHistorical Med      ibuprofen (ADVIL;MOTRIN) 800 MG tablet ibuprofen 800 MG Oral Tablet  oral   prn    ActiveHistorical Med             ALLERGIES     Morphine    FAMILYHISTORY       Family History   Problem Relation Age of Onset    Other Father         sarcoidosis        SOCIAL HISTORY Social History     Tobacco Use    Smoking status: Never    Smokeless tobacco: Never   Vaping Use    Vaping Use: Never used   Substance Use Topics    Alcohol use: Not Currently    Drug use: Never       SCREENINGS        Blade Coma Scale  Eye Opening: Spontaneous  Best Verbal Response: Oriented  Best Motor Response: Obeys commands  Blade Coma Scale Score: 15                CIWA Assessment  BP: (!) 108/58  Heart Rate: 89           PHYSICAL EXAM  1 or more Elements     ED Triage Vitals   BP Temp Temp Source Heart Rate Resp SpO2 Height Weight   02/04/23 0901 02/04/23 0859 02/04/23 0859 02/04/23 0859 02/04/23 0859 02/04/23 0859 -- 02/04/23 0859   123/68 98.4 °F (36.9 °C) Oral 82 17 92 %  190 lb (86.2 kg)       Physical Exam  Vitals and nursing note reviewed. Exam conducted with a chaperone present. Constitutional:       Appearance: Normal appearance. He is well-developed. He is not diaphoretic. HENT:      Head: Normocephalic and atraumatic. Nose: Nose normal.      Mouth/Throat:      Mouth: Mucous membranes are moist.      Pharynx: No oropharyngeal exudate. Eyes:      General:         Right eye: No discharge. Left eye: No discharge. Extraocular Movements: Extraocular movements intact. Conjunctiva/sclera: Conjunctivae normal.      Pupils: Pupils are equal, round, and reactive to light. Cardiovascular:      Rate and Rhythm: Normal rate and regular rhythm. Heart sounds: Normal heart sounds. No murmur heard. No friction rub. No gallop. Pulmonary:      Effort: Pulmonary effort is normal. No respiratory distress. Breath sounds: Normal breath sounds. No wheezing. Abdominal:      General: Bowel sounds are normal. There is no distension. Palpations: Abdomen is soft. Tenderness: There is no abdominal tenderness. Genitourinary:     Penis: Tenderness present. Testes:         Right: Tenderness present. Left: Tenderness present.    Musculoskeletal: General: Normal range of motion. Cervical back: Normal range of motion. Skin:     General: Skin is warm and dry. Capillary Refill: Capillary refill takes less than 2 seconds. Neurological:      General: No focal deficit present. Mental Status: He is alert. Psychiatric:         Mood and Affect: Mood normal.         Behavior: Behavior normal.       PROCEDURES, entire procedures are supervised directly by attending ED provider Dr. Melany Rhodes    Penile Block:   penile block with 4 cc of 0.5% bupivacaine and 2% lidocaine without epinephrine in a 50-50 mix injected in equal portion at the 10:00 and 2:00 positions. Intracavernosal injection  Intracavernosal injection of phenylephrine. 500mcg/mL 1 injection in one of the Right corpora cavernosum and then the other injectiion then the left side. Patient tolerated procedure both sides with mild pain and only a drop of blood. About 15 minutes after the second cavernosal injection patient had complete resolution of his priapism. DIAGNOSTIC RESULTS     PAST MEDICAL HISTORY      has a past medical history of Accidental overdose, Asthma, DVT (deep venous thrombosis) (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 [address] and Sickle cell anemia ([facility] [address]      Chronic Conditions affecting Care: above    EMERGENCY DEPARTMENT COURSE and DIFFERENTIAL DIAGNOSIS/MDM:   Vitals:    Vitals:    02/04/23 0859 02/04/23 0901 02/04/23 1001   BP:  123/68 (!) 108/58   Pulse: 82  89   Resp: 17  17   Temp: 98.4 °F (36.9 °C)     TempSrc: Oral     SpO2: 92%     Weight: 190 lb (86.2 kg)         Patient was given the following medications:  Medications   HYDROmorphone (DILAUDID) injection 1 mg (1 mg IntraMUSCular Given 2/4/23 0930)   phenylephrine 1 MG/10ML injection 1 mg (1 mg IntraVENous Given by Other 2/4/23 0913)           CC/HPI Summary, DDx, ED Course, and Reassessment: Nontoxic patient in moderate distress on initial presentation provided with Dilaudid. Successful penile block is provided to the patient and then enter cavernosal injection, then placed a Ace wrap around his penis. On reevaluation approximately 15 minutes later patient is comfortable he advised resolution of his symptoms and pain. And he is getting dressed ready to leave the ER. Patient advised he does have an appointment coming up with urology. Strongly encouraged him to keep that appointment follow-up closely with them and to consider taking the medications he has previously been prescribed for this in the past.  At this time we have no indication for inpatient treatment the patient's symptoms have resolved and he is appropriate to be discharged in stable condition. I am the Primary Clinician of Record. FINAL IMPRESSION      1.  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  Efforts were made to edit the dictations but occasionally words are mis-transcribed.)    Christo Funk PA-C (electronically signed)            Christo Funk PA-C  02/04/23 [id number]

## 2023-02-04 NOTE — ED NOTES
RN to hold on drawing labs at this time per [facility] Scott [facility]  02/04/23 0053

## 2023-02-04 NOTE — ED NOTES
Patient discharged with all belongings, discharge paperwork. Patient verbalized understanding of discharge instructions and follow up with Dr. Minh Dan. Patient ambulatory out of ED with family/friend.        Guillermo Jung RN  02/04/23 [id number]

## 2023-02-04 NOTE — DISCHARGE INSTRUCTIONS
Keep the appointment you have scheduled to follow-up.   Strongly encourage that you fill and take your sildenafil as directed by your urologist

## 2023-02-09 ENCOUNTER — HOSPITAL ENCOUNTER (INPATIENT)
Age: 23
LOS: 4 days | Discharge: HOME | DRG: 812 | End: 2023-02-14
Attending: STUDENT IN AN ORGANIZED HEALTH CARE EDUCATION/TRAINING PROGRAM | Admitting: HOSPITALIST
Payer: COMMERCIAL

## 2023-02-09 ENCOUNTER — APPOINTMENT (OUTPATIENT)
Dept: GENERAL RADIOLOGY | Age: 23
DRG: 812 | End: 2023-02-09
Payer: COMMERCIAL

## 2023-02-09 DIAGNOSIS — R52 INTRACTABLE PAIN: ICD-10-CM

## 2023-02-09 DIAGNOSIS — D57.00 SICKLE CELL CRISIS (HCC): Primary | ICD-10-CM

## 2023-02-09 LAB
A/G RATIO: 1.4 (ref 1.1–2.2)
ALBUMIN SERPL-MCNC: 4.6 G/DL (ref 3.4–5)
ALP BLD-CCNC: 79 U/L (ref 40–129)
ALT SERPL-CCNC: 18 U/L (ref 10–40)
ANION GAP SERPL CALCULATED.3IONS-SCNC: 11 MMOL/L (ref 3–16)
AST SERPL-CCNC: 67 U/L (ref 15–37)
BASOPHILS ABSOLUTE: 0 K/UL (ref 0–0.2)
BASOPHILS RELATIVE PERCENT: 0 %
BILIRUB SERPL-MCNC: 5.3 MG/DL (ref 0–1)
BUN BLDV-MCNC: 11 MG/DL (ref 7–20)
CALCIUM SERPL-MCNC: 9.4 MG/DL (ref 8.3–10.6)
CHLORIDE BLD-SCNC: 106 MMOL/L (ref 99–110)
CO2: 23 MMOL/L (ref 21–32)
CREAT SERPL-MCNC: <0.5 MG/DL (ref 0.9–1.3)
EOSINOPHILS ABSOLUTE: 0.2 K/UL (ref 0–0.6)
EOSINOPHILS RELATIVE PERCENT: 1 %
GFR SERPL CREATININE-BSD FRML MDRD: >60 ML/MIN/{1.73_M2}
GLUCOSE BLD-MCNC: 111 MG/DL (ref 70–99)
HCT VFR BLD CALC: 23.6 % (ref 40.5–52.5)
HEMOGLOBIN: 8.8 G/DL (ref 13.5–17.5)
LYMPHOCYTES ABSOLUTE: 1.4 K/UL (ref 1–5.1)
LYMPHOCYTES RELATIVE PERCENT: 9 %
MACROCYTES: ABNORMAL
MCH RBC QN AUTO: 36 PG (ref 26–34)
MCHC RBC AUTO-ENTMCNC: 37.2 G/DL (ref 31–36)
MCV RBC AUTO: 97 FL (ref 80–100)
MONOCYTES ABSOLUTE: 0.3 K/UL (ref 0–1.3)
MONOCYTES RELATIVE PERCENT: 2 %
NEUTROPHILS ABSOLUTE: 13.4 K/UL (ref 1.7–7.7)
NEUTROPHILS RELATIVE PERCENT: 88 %
NUCLEATED RED BLOOD CELLS: 1 /100 WBC
PDW BLD-RTO: 28.5 % (ref 12.4–15.4)
PLATELET # BLD: 388 K/UL (ref 135–450)
PLATELET SLIDE REVIEW: ADEQUATE
PMV BLD AUTO: 8.7 FL (ref 5–10.5)
POLYCHROMASIA: ABNORMAL
POTASSIUM REFLEX MAGNESIUM: 5 MMOL/L (ref 3.5–5.1)
RBC # BLD: 2.43 M/UL (ref 4.2–5.9)
REASON FOR REJECTION: NORMAL
REJECTED TEST: NORMAL
SCHISTOCYTES: ABNORMAL
SICKLE CELLS: ABNORMAL
SLIDE REVIEW: ABNORMAL
SODIUM BLD-SCNC: 140 MMOL/L (ref 136–145)
TARGET CELLS: ABNORMAL
TOTAL PROTEIN: 7.8 G/DL (ref 6.4–8.2)
WBC # BLD: 15.2 K/UL (ref 4–11)

## 2023-02-09 PROCEDURE — 99285 EMERGENCY DEPT VISIT HI MDM: CPT

## 2023-02-09 PROCEDURE — 85045 AUTOMATED RETICULOCYTE COUNT: CPT

## 2023-02-09 PROCEDURE — 96374 THER/PROPH/DIAG INJ IV PUSH: CPT

## 2023-02-09 PROCEDURE — 6360000002 HC RX W HCPCS: Performed by: PHYSICIAN ASSISTANT

## 2023-02-09 PROCEDURE — 80053 COMPREHEN METABOLIC PANEL: CPT

## 2023-02-09 PROCEDURE — 84145 PROCALCITONIN (PCT): CPT

## 2023-02-09 PROCEDURE — 96375 TX/PRO/DX INJ NEW DRUG ADDON: CPT

## 2023-02-09 PROCEDURE — 2580000003 HC RX 258: Performed by: PHYSICIAN ASSISTANT

## 2023-02-09 PROCEDURE — 36415 COLL VENOUS BLD VENIPUNCTURE: CPT

## 2023-02-09 PROCEDURE — 96361 HYDRATE IV INFUSION ADD-ON: CPT

## 2023-02-09 PROCEDURE — 6370000000 HC RX 637 (ALT 250 FOR IP): Performed by: PHYSICIAN ASSISTANT

## 2023-02-09 PROCEDURE — 85025 COMPLETE CBC W/AUTO DIFF WBC: CPT

## 2023-02-09 PROCEDURE — 96376 TX/PRO/DX INJ SAME DRUG ADON: CPT

## 2023-02-09 PROCEDURE — 73560 X-RAY EXAM OF KNEE 1 OR 2: CPT

## 2023-02-09 RX ORDER — OXYCODONE HYDROCHLORIDE AND ACETAMINOPHEN 5; 325 MG/1; MG/1
1 TABLET ORAL ONCE
Status: COMPLETED | OUTPATIENT
Start: 2023-02-09 | End: 2023-02-09

## 2023-02-09 RX ORDER — 0.9 % SODIUM CHLORIDE 0.9 %
1000 INTRAVENOUS SOLUTION INTRAVENOUS ONCE
Status: COMPLETED | OUTPATIENT
Start: 2023-02-09 | End: 2023-02-09

## 2023-02-09 RX ORDER — KETOROLAC TROMETHAMINE 30 MG/ML
15 INJECTION, SOLUTION INTRAMUSCULAR; INTRAVENOUS ONCE
Status: COMPLETED | OUTPATIENT
Start: 2023-02-09 | End: 2023-02-09

## 2023-02-09 RX ORDER — FENTANYL CITRATE 50 UG/ML
50 INJECTION, SOLUTION INTRAMUSCULAR; INTRAVENOUS ONCE
Status: COMPLETED | OUTPATIENT
Start: 2023-02-09 | End: 2023-02-09

## 2023-02-09 RX ADMIN — HYDROMORPHONE HYDROCHLORIDE 1 MG: 1 INJECTION, SOLUTION INTRAMUSCULAR; INTRAVENOUS; SUBCUTANEOUS at 20:46

## 2023-02-09 RX ADMIN — KETOROLAC TROMETHAMINE 15 MG: 30 INJECTION, SOLUTION INTRAMUSCULAR; INTRAVENOUS at 20:46

## 2023-02-09 RX ADMIN — OXYCODONE HYDROCHLORIDE AND ACETAMINOPHEN 1 TABLET: 5; 325 TABLET ORAL at 22:58

## 2023-02-09 RX ADMIN — FENTANYL CITRATE 50 MCG: 50 INJECTION INTRAMUSCULAR; INTRAVENOUS at 21:39

## 2023-02-09 RX ADMIN — SODIUM CHLORIDE 1000 ML: 9 INJECTION, SOLUTION INTRAVENOUS at 21:42

## 2023-02-09 ASSESSMENT — PAIN DESCRIPTION - PAIN TYPE: TYPE: ACUTE PAIN

## 2023-02-09 ASSESSMENT — PAIN DESCRIPTION - LOCATION
LOCATION: LEG

## 2023-02-09 ASSESSMENT — ENCOUNTER SYMPTOMS
VOMITING: 0
SHORTNESS OF BREATH: 0
COLOR CHANGE: 0
COUGH: 0

## 2023-02-09 ASSESSMENT — PAIN SCALES - GENERAL
PAINLEVEL_OUTOF10: 10
PAINLEVEL_OUTOF10: 9

## 2023-02-09 ASSESSMENT — PAIN DESCRIPTION - ORIENTATION
ORIENTATION: LEFT

## 2023-02-09 ASSESSMENT — PAIN DESCRIPTION - DESCRIPTORS: DESCRIPTORS: SHARP;ACHING

## 2023-02-09 ASSESSMENT — PAIN - FUNCTIONAL ASSESSMENT: PAIN_FUNCTIONAL_ASSESSMENT: 0-10

## 2023-02-09 NOTE — ED TRIAGE NOTES
Chief Complaint   Patient presents with    Leg Pain     EMS states pt has hx of sickle cell, states he is having a crisis, left leg pain

## 2023-02-10 ENCOUNTER — APPOINTMENT (OUTPATIENT)
Dept: GENERAL RADIOLOGY | Age: 23
DRG: 812 | End: 2023-02-10
Payer: COMMERCIAL

## 2023-02-10 PROBLEM — R52 INTRACTABLE PAIN: Status: ACTIVE | Noted: 2023-02-10

## 2023-02-10 PROBLEM — D57.09 PRIAPISM DUE TO SICKLE CELL DISEASE (HCC): Status: ACTIVE | Noted: 2020-07-28

## 2023-02-10 PROBLEM — K21.9 GASTROESOPHAGEAL REFLUX DISEASE WITHOUT ESOPHAGITIS: Status: ACTIVE | Noted: 2023-02-10

## 2023-02-10 PROBLEM — N48.32 PRIAPISM DUE TO SICKLE CELL DISEASE (HCC): Status: ACTIVE | Noted: 2020-07-28

## 2023-02-10 LAB
BACTERIA: ABNORMAL /HPF
BILIRUBIN URINE: NEGATIVE
BLOOD, URINE: ABNORMAL
CLARITY: CLEAR
COLOR: YELLOW
EPITHELIAL CELLS, UA: ABNORMAL /HPF (ref 0–5)
GLUCOSE URINE: NEGATIVE MG/DL
IMMATURE RETIC FRACT: 0.7 (ref 0.21–0.37)
INFLUENZA A: NOT DETECTED
INFLUENZA B: NOT DETECTED
KETONES, URINE: NEGATIVE MG/DL
LEUKOCYTE ESTERASE, URINE: NEGATIVE
MICROSCOPIC EXAMINATION: YES
NITRITE, URINE: NEGATIVE
PH UA: 7 (ref 5–8)
PROCALCITONIN: 0.07 NG/ML (ref 0–0.15)
PROTEIN UA: 30 MG/DL
RBC UA: ABNORMAL /HPF (ref 0–4)
RETICULOCYTE ABSOLUTE COUNT: 0.51 M/UL
RETICULOCYTE COUNT PCT: 20.83 % (ref 0.5–2.18)
SARS-COV-2 RNA, RT PCR: NOT DETECTED
SPECIFIC GRAVITY UA: 1.01 (ref 1–1.03)
TROPONIN: <0.01 NG/ML
TROPONIN: <0.01 NG/ML
URINE REFLEX TO CULTURE: ABNORMAL
URINE TYPE: ABNORMAL
UROBILINOGEN, URINE: 0.2 E.U./DL
WBC UA: ABNORMAL /HPF (ref 0–5)

## 2023-02-10 PROCEDURE — 6370000000 HC RX 637 (ALT 250 FOR IP): Performed by: HOSPITALIST

## 2023-02-10 PROCEDURE — 87636 SARSCOV2 & INF A&B AMP PRB: CPT

## 2023-02-10 PROCEDURE — 6360000002 HC RX W HCPCS: Performed by: HOSPITALIST

## 2023-02-10 PROCEDURE — 6360000002 HC RX W HCPCS: Performed by: STUDENT IN AN ORGANIZED HEALTH CARE EDUCATION/TRAINING PROGRAM

## 2023-02-10 PROCEDURE — 36415 COLL VENOUS BLD VENIPUNCTURE: CPT

## 2023-02-10 PROCEDURE — 81001 URINALYSIS AUTO W/SCOPE: CPT

## 2023-02-10 PROCEDURE — 6370000000 HC RX 637 (ALT 250 FOR IP)

## 2023-02-10 PROCEDURE — 99223 1ST HOSP IP/OBS HIGH 75: CPT

## 2023-02-10 PROCEDURE — 93005 ELECTROCARDIOGRAM TRACING: CPT

## 2023-02-10 PROCEDURE — 84484 ASSAY OF TROPONIN QUANT: CPT

## 2023-02-10 PROCEDURE — 2580000003 HC RX 258: Performed by: HOSPITALIST

## 2023-02-10 PROCEDURE — 71045 X-RAY EXAM CHEST 1 VIEW: CPT

## 2023-02-10 PROCEDURE — 2060000000 HC ICU INTERMEDIATE R&B

## 2023-02-10 RX ORDER — SODIUM CHLORIDE 9 MG/ML
INJECTION, SOLUTION INTRAVENOUS PRN
Status: DISCONTINUED | OUTPATIENT
Start: 2023-02-10 | End: 2023-02-14 | Stop reason: HOSPADM

## 2023-02-10 RX ORDER — POLYETHYLENE GLYCOL 3350 17 G/17G
17 POWDER, FOR SOLUTION ORAL DAILY PRN
Status: DISCONTINUED | OUTPATIENT
Start: 2023-02-10 | End: 2023-02-14 | Stop reason: HOSPADM

## 2023-02-10 RX ORDER — PANTOPRAZOLE SODIUM 40 MG/1
40 TABLET, DELAYED RELEASE ORAL
Status: DISCONTINUED | OUTPATIENT
Start: 2023-02-10 | End: 2023-02-14 | Stop reason: HOSPADM

## 2023-02-10 RX ORDER — PSEUDOEPHEDRINE HCL 30 MG
60 TABLET ORAL ONCE
Status: COMPLETED | OUTPATIENT
Start: 2023-02-10 | End: 2023-02-10

## 2023-02-10 RX ORDER — SODIUM CHLORIDE 0.9 % (FLUSH) 0.9 %
5-40 SYRINGE (ML) INJECTION PRN
Status: DISCONTINUED | OUTPATIENT
Start: 2023-02-10 | End: 2023-02-14 | Stop reason: HOSPADM

## 2023-02-10 RX ORDER — SODIUM CHLORIDE 0.9 % (FLUSH) 0.9 %
5-40 SYRINGE (ML) INJECTION EVERY 12 HOURS SCHEDULED
Status: DISCONTINUED | OUTPATIENT
Start: 2023-02-10 | End: 2023-02-14 | Stop reason: HOSPADM

## 2023-02-10 RX ORDER — HYDROMORPHONE HYDROCHLORIDE 2 MG/ML
1.5 INJECTION, SOLUTION INTRAMUSCULAR; INTRAVENOUS; SUBCUTANEOUS
Status: DISCONTINUED | OUTPATIENT
Start: 2023-02-10 | End: 2023-02-14 | Stop reason: HOSPADM

## 2023-02-10 RX ORDER — IBUPROFEN 800 MG/1
800 TABLET ORAL EVERY 6 HOURS PRN
Status: DISCONTINUED | OUTPATIENT
Start: 2023-02-10 | End: 2023-02-14 | Stop reason: HOSPADM

## 2023-02-10 RX ORDER — ONDANSETRON 2 MG/ML
4 INJECTION INTRAMUSCULAR; INTRAVENOUS EVERY 6 HOURS PRN
Status: DISCONTINUED | OUTPATIENT
Start: 2023-02-10 | End: 2023-02-14 | Stop reason: HOSPADM

## 2023-02-10 RX ORDER — ALBUTEROL SULFATE 90 UG/1
2 AEROSOL, METERED RESPIRATORY (INHALATION) EVERY 4 HOURS PRN
Status: DISCONTINUED | OUTPATIENT
Start: 2023-02-10 | End: 2023-02-14 | Stop reason: HOSPADM

## 2023-02-10 RX ORDER — SILDENAFIL CITRATE 20 MG/1
20 TABLET ORAL DAILY
Status: DISCONTINUED | OUTPATIENT
Start: 2023-02-10 | End: 2023-02-10

## 2023-02-10 RX ORDER — FOLIC ACID 1 MG/1
2 TABLET ORAL DAILY
Status: DISCONTINUED | OUTPATIENT
Start: 2023-02-10 | End: 2023-02-14 | Stop reason: HOSPADM

## 2023-02-10 RX ORDER — ACETAMINOPHEN 325 MG/1
650 TABLET ORAL EVERY 6 HOURS PRN
Status: DISCONTINUED | OUTPATIENT
Start: 2023-02-10 | End: 2023-02-14 | Stop reason: HOSPADM

## 2023-02-10 RX ORDER — SODIUM CHLORIDE 9 MG/ML
INJECTION, SOLUTION INTRAVENOUS CONTINUOUS
Status: DISCONTINUED | OUTPATIENT
Start: 2023-02-10 | End: 2023-02-14 | Stop reason: HOSPADM

## 2023-02-10 RX ORDER — ENOXAPARIN SODIUM 100 MG/ML
40 INJECTION SUBCUTANEOUS DAILY
Status: DISCONTINUED | OUTPATIENT
Start: 2023-02-10 | End: 2023-02-14 | Stop reason: HOSPADM

## 2023-02-10 RX ORDER — ACETAMINOPHEN 650 MG/1
650 SUPPOSITORY RECTAL EVERY 6 HOURS PRN
Status: DISCONTINUED | OUTPATIENT
Start: 2023-02-10 | End: 2023-02-14 | Stop reason: HOSPADM

## 2023-02-10 RX ORDER — ONDANSETRON 4 MG/1
4 TABLET, ORALLY DISINTEGRATING ORAL EVERY 8 HOURS PRN
Status: DISCONTINUED | OUTPATIENT
Start: 2023-02-10 | End: 2023-02-14 | Stop reason: HOSPADM

## 2023-02-10 RX ORDER — LEVALBUTEROL 1.25 MG/.5ML
0.31 SOLUTION, CONCENTRATE RESPIRATORY (INHALATION) EVERY 4 HOURS PRN
Status: DISCONTINUED | OUTPATIENT
Start: 2023-02-10 | End: 2023-02-14 | Stop reason: HOSPADM

## 2023-02-10 RX ADMIN — HYDROMORPHONE HYDROCHLORIDE 1.5 MG: 2 INJECTION, SOLUTION INTRAMUSCULAR; INTRAVENOUS; SUBCUTANEOUS at 13:58

## 2023-02-10 RX ADMIN — HYDROMORPHONE HYDROCHLORIDE 1 MG: 1 INJECTION, SOLUTION INTRAMUSCULAR; INTRAVENOUS; SUBCUTANEOUS at 04:01

## 2023-02-10 RX ADMIN — PSEUDOEPHEDRINE HCL 60 MG: 30 TABLET, FILM COATED ORAL at 11:55

## 2023-02-10 RX ADMIN — HYDROMORPHONE HYDROCHLORIDE 1 MG: 1 INJECTION, SOLUTION INTRAMUSCULAR; INTRAVENOUS; SUBCUTANEOUS at 00:39

## 2023-02-10 RX ADMIN — HYDROMORPHONE HYDROCHLORIDE 1.5 MG: 2 INJECTION, SOLUTION INTRAMUSCULAR; INTRAVENOUS; SUBCUTANEOUS at 20:31

## 2023-02-10 RX ADMIN — FOLIC ACID 2 MG: 1 TABLET ORAL at 10:08

## 2023-02-10 RX ADMIN — HYDROMORPHONE HYDROCHLORIDE 1.5 MG: 2 INJECTION, SOLUTION INTRAMUSCULAR; INTRAVENOUS; SUBCUTANEOUS at 23:31

## 2023-02-10 RX ADMIN — SODIUM CHLORIDE: 9 INJECTION, SOLUTION INTRAVENOUS at 01:02

## 2023-02-10 RX ADMIN — HYDROMORPHONE HYDROCHLORIDE 1.5 MG: 2 INJECTION, SOLUTION INTRAMUSCULAR; INTRAVENOUS; SUBCUTANEOUS at 11:08

## 2023-02-10 RX ADMIN — Medication 10 ML: at 08:07

## 2023-02-10 RX ADMIN — HYDROMORPHONE HYDROCHLORIDE 1.5 MG: 2 INJECTION, SOLUTION INTRAMUSCULAR; INTRAVENOUS; SUBCUTANEOUS at 08:10

## 2023-02-10 RX ADMIN — PANTOPRAZOLE SODIUM 40 MG: 40 TABLET, DELAYED RELEASE ORAL at 05:28

## 2023-02-10 RX ADMIN — HYDROMORPHONE HYDROCHLORIDE 1.5 MG: 2 INJECTION, SOLUTION INTRAMUSCULAR; INTRAVENOUS; SUBCUTANEOUS at 17:34

## 2023-02-10 ASSESSMENT — PAIN DESCRIPTION - LOCATION
LOCATION: LEG;GROIN
LOCATION: LEG
LOCATION: LEG;GROIN
LOCATION: LEG
LOCATION: LEG
LOCATION: GROIN;LEG

## 2023-02-10 ASSESSMENT — PAIN DESCRIPTION - DESCRIPTORS
DESCRIPTORS: SHARP
DESCRIPTORS: ACHING;SHOOTING;STABBING

## 2023-02-10 ASSESSMENT — PAIN SCALES - GENERAL
PAINLEVEL_OUTOF10: 6
PAINLEVEL_OUTOF10: 0
PAINLEVEL_OUTOF10: 0
PAINLEVEL_OUTOF10: 8
PAINLEVEL_OUTOF10: 9
PAINLEVEL_OUTOF10: 8
PAINLEVEL_OUTOF10: 9
PAINLEVEL_OUTOF10: 2
PAINLEVEL_OUTOF10: 8
PAINLEVEL_OUTOF10: 8
PAINLEVEL_OUTOF10: 10
PAINLEVEL_OUTOF10: 9
PAINLEVEL_OUTOF10: 10
PAINLEVEL_OUTOF10: 0
PAINLEVEL_OUTOF10: 10

## 2023-02-10 ASSESSMENT — LIFESTYLE VARIABLES
HOW MANY STANDARD DRINKS CONTAINING ALCOHOL DO YOU HAVE ON A TYPICAL DAY: PATIENT DOES NOT DRINK
HOW OFTEN DO YOU HAVE A DRINK CONTAINING ALCOHOL: NEVER

## 2023-02-10 ASSESSMENT — PAIN DESCRIPTION - ORIENTATION
ORIENTATION: LEFT

## 2023-02-10 NOTE — H&P
Hospital Medicine History & Physical      PCP: Tracey Rock MD    Date of Admission: 2/9/2023    Date of Service: Pt seen/examined on 02/10/23       Chief Complaint:    Chief Complaint   Patient presents with    Leg Pain     EMS states pt has hx of sickle cell, states he is having a crisis, left leg pain         History Of Present Illness: The patient is a 20 y.o. male with asthma, cardiomegaly, hx DVT and sickle cell disease who presented to [facility] ED with complaint of left knee pain. Patient states he woke from sleep with acute onset left knee pain. He denies and inciting trauma. Reports this pain is similar in nature to prior sickle cell crises. States since being in the ED, he also developed a painful erection, which is also a frequent manifestation of his sickle cell disease. When asked if experiencing chest pain or SOB, patient states he did have an episode of chest pain 2 days ago that resolved spontaneously. He has not had chest pain since. Otherwise, he denies fever, chills, n/v/d, or abdominal pain. States he has not been taking any of his home medications. 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  Authorizing Provider   sildenafil (REVATIO) 20 MG tablet Take 1 tablet by mouth daily 11/21/22 12/21/22  Canelo Harrison MD   sildenafil (REVATIO) 20 MG tablet Take 1 tablet by mouth daily 10/24/22 11/23/22  María Elena Fang, APRN - CNP   DULoxetine (CYMBALTA) 30 MG extended release capsule  5/10/22   Historical Provider, MD   apixaban (ELIQUIS) 5 MG TABS tablet Take 1 tablet by mouth 2 times daily  Patient not taking: No sig reported 12/31/21   Tiffanie Antoine MD   pantoprazole (PROTONIX) 40 MG tablet Take 1 tablet by mouth every morning (before breakfast)  Patient not taking: Reported on 2/10/2023 1/1/22   Tiffanie Antoine MD   folic acid (FOLVITE) 1 MG tablet Take 2 tablets by mouth daily 9/16/21   CARMELITA Dowling CNP   oxyCODONE HCl (OXY-IR) 10 MG immediate release tablet Take 10 mg by mouth every 4 hours as needed for Pain. Historical Provider, MD   albuterol sulfate HFA (PROVENTIL;VENTOLIN;PROAIR) 108 (90 Base) MCG/ACT inhaler Albuterol HFA Inhaler 90 mcg/actuation  inhaled  2 puffs prn     Active    Historical Provider, MD   levalbuterol (XOPENEX) 0.31 MG/3ML nebulization Levalbuterol Nebulized    2 puffs prn     Active    Historical Provider, MD   docusate (COLACE, DULCOLAX) 100 MG CAPS Take 100 mg by mouth  Patient not taking: No sig reported    Historical Provider, MD   ibuprofen (ADVIL;MOTRIN) 800 MG tablet ibuprofen 800 MG Oral Tablet  oral   prn    Active    Historical Provider, MD       Allergies:  Morphine    Social History:  The patient currently lives at home    TOBACCO:   reports that he has never smoked. He has never been exposed to tobacco smoke. He has never used smokeless tobacco.  ETOH:   reports that he does not currently use alcohol.       Family History:   Positive as follows:        Problem Relation Age of Onset    Other Father         sarcoidosis       REVIEW OF SYSTEMS:     Constitutional: Negative for fever   HENT: Negative for sore throat   Eyes: Negative for redness   Respiratory: Negative  for dyspnea, cough   Cardiovascular: Negative for chest pain   Gastrointestinal: Negative for vomiting, diarrhea   Genitourinary: Negative for hematuria   Musculoskeletal: Negative for arthralgias   Skin: Negative for rash   Neurological: Negative for syncope   Hematological: Negative for adenopathy   Psychiatric/Behavorial: Negative for anxiety    PHYSICAL EXAM:    BP (!) 117/51   Pulse 74   Temp 98 °F (36.7 °C) (Oral)   Resp 16   Ht 5' 9\" (1.753 m)   Wt 190 lb (86.2 kg)   SpO2 95%   BMI 28.06 kg/m²   Gen: No distress. Alert. Eyes: PERRL. No sclera icterus. No conjunctival injection. ENT: No discharge. Pharynx clear. Neck: No JVD. No Carotid Bruit. Trachea midline. Resp: No accessory muscle use. No crackles. No wheezes. No rhonchi. CV: Regular rate. Regular rhythm. No murmur. No rub. No edema. Peripheral Pulses: +2 palpable, equal bilaterally   GI: Non-tender. Non-distended. No masses. No organomegaly. Normal bowel sounds. No hernia. Skin: Warm and dry. No nodule on exposed extremities. No rash on exposed extremities. M/S: No cyanosis. No joint deformity. No clubbing. No tenderness to palpation of lower extremities. Neuro: Awake. Grossly nonfocal    Psych: Oriented x 3. No anxiety or agitation. CBC:   Recent Labs     02/09/23 2049   WBC 15.2*   HGB 8.8*   HCT 23.6*   MCV 97.0        BMP:   Recent Labs     02/09/23 1853      K 5.0      CO2 23   BUN 11   CREATININE <0.5*     LIVER PROFILE:   Recent Labs     02/09/23 1853   AST 67*   ALT 18   BILITOT 5.3*   ALKPHOS 79     UA:  Recent Labs     02/10/23  0106   COLORU Yellow   PHUR 7.0   WBCUA None seen   RBCUA 0-2   BACTERIA Rare*   CLARITYU Clear   SPECGRAV 1.010   LEUKOCYTESUR Negative   UROBILINOGEN 0.2   BILIRUBINUR Negative   BLOODU MODERATE*   GLUCOSEU Negative          CARDIAC ENZYMES  No results for input(s): CKTOTAL, CKMB, CKMBINDEX, TROPONINI in the last 72 hours.     U/A:    Lab Results   Component Value Date/Time    COLORU Yellow 02/10/2023 01:06 AM    WBCUA None seen 02/10/2023 01:06 AM    RBCUA 0-2 02/10/2023 01:06 AM    MUCUS 1+ 05/27/2022 07:00 AM    BACTERIA Rare 02/10/2023 01:06 AM    CLARITYU Clear 02/10/2023 01:06 AM    SPECGRAV 1.010 02/10/2023 01:06 AM    LEUKOCYTESUR Negative 02/10/2023 01:06 AM    BLOODU MODERATE 02/10/2023 01:06 AM    GLUCOSEU Negative 02/10/2023 01:06 AM    AMORPHOUS Rare 03/04/2022 07:53 PM       ABG    Lab Results   Component Value Date/Time    OSW1GXS 26.9 09/26/2020 07:57 PM    BEART 1.8 09/26/2020 07:57 PM    M3ZPCTHM 88.5 09/26/2020 07:57 PM    PHART 7.396 09/26/2020 07:57 PM    KHA0IOV 44.8 09/26/2020 07:57 PM    PO2ART 69.8 09/26/2020 07:57 PM    AUQ2NCW 28.3 09/26/2020 07:57 PM       CULTURES  Covid/influenza not detected     EKG:    Pending    RADIOLOGY  XR CHEST PORTABLE   Preliminary Result   Stable chronic cardiomegaly, without acute airspace consolidation or CHF. XR KNEE LEFT (1-2 VIEWS)   Final Result   No acute abnormality of the knee. 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  -non-compliant w/ prescribed sildenafil  -urology consulted   -has had success in past with sudafed, alkalinization, ice packs and supplemental O2  -sudafed x1 ordered with resolution  -will monitor     #Chest pain  -reports acute onset CP 2 days ago, spontaneously resolved  -no associated SOB  -EKG pending  -trop neg x2    #LFT elevation, chronic  -bilirubin 5.3; AST 67  -likely 2/2 sickle cell  -patient denies RUQ pain    #Asthma  -without acute exacerbation   -continue home inhalers    #GERD  -no longer taking PPI     #Vitamin D deficiency       DVT Prophylaxis: Lovenox  Diet: ADULT DIET;  Regular  Code Status: Full Code    Andre Cedillo PA-C  2/10/2023  8:55 AM

## 2023-02-10 NOTE — ED NOTES
0916-Call placed to Urology for consult placed by Dr. Rhonda Colon, will connect the call to the Nurse taking care of the patient when they call back. Alivia An  02/10/23 [id number]-Call back received from Dr. Li Desai Urology spoke with Anu Lu RN regarding consult.       Alivia An  02/10/23 [id number]

## 2023-02-10 NOTE — ED NOTES
Pt states his pain is severe, nothing is helping.  If the pain medication given doesn't help, pt wants to be admitted to hospital.      [facility]  02/09/23 7618

## 2023-02-10 NOTE — PROGRESS NOTES
RT Inhaler-Nebulizer Bronchodilator Protocol Note    There is a bronchodilator order in the chart from a provider indicating to follow the RT Bronchodilator Protocol and there is an Initiate RT Inhaler-Nebulizer Bronchodilator Protocol order as well (see protocol at bottom of note). CXR Findings:  XR CHEST PORTABLE    Result Date: 2/10/2023  Stable chronic cardiomegaly, without acute airspace consolidation or CHF. The findings from the last RT Protocol Assessment were as follows:   History Pulmonary Disease: None or smoker <15 pack years  Respiratory Pattern: Regular pattern and RR 12-20 bpm  Breath Sounds: Slightly diminished and/or crackles  Cough: Strong, spontaneous, non-productive  Indication for Bronchodilator Therapy: Decreased or absent breath sounds  Bronchodilator Assessment Score: 2    Aerosolized bronchodilator medication orders have been revised according to the RT Inhaler-Nebulizer Bronchodilator Protocol below. Respiratory Therapist to perform RT Therapy Protocol Assessment initially then follow the protocol. Repeat RT Therapy Protocol Assessment PRN for score 0-3 or on second treatment, BID, and PRN for scores above 3. No Indications - adjust the frequency to every 6 hours PRN wheezing or bronchospasm, if no treatments needed after 48 hours then discontinue using Per Protocol order mode. If indication present, adjust the RT bronchodilator orders based on the Bronchodilator Assessment Score as indicated below. Use Inhaler orders unless patient has one or more of the following: on home nebulizer, not able to hold breath for 10 seconds, is not alert and oriented, cannot activate and use MDI correctly, or respiratory rate 25 breaths per minute or more, then use the equivalent nebulizer order(s) with same Frequency and PRN reasons based on the score. If a patient is on this medication at home then do not decrease Frequency below that used at home.     0-3 - enter or revise RT bronchodilator order(s) to equivalent RT Bronchodilator order with Frequency of every 4 hours PRN for wheezing or increased work of breathing using Per Protocol order mode. 4-6 - enter or revise RT Bronchodilator order(s) to two equivalent RT bronchodilator orders with one order with BID Frequency and one order with Frequency of every 4 hours PRN wheezing or increased work of breathing using Per Protocol order mode. 7-10 - enter or revise RT Bronchodilator order(s) to two equivalent RT bronchodilator orders with one order with TID Frequency and one order with Frequency of every 4 hours PRN wheezing or increased work of breathing using Per Protocol order mode. 11-13 - enter or revise RT Bronchodilator order(s) to one equivalent RT bronchodilator order with QID Frequency and an Albuterol order with Frequency of every 4 hours PRN wheezing or increased work of breathing using Per Protocol order mode. Greater than 13 - enter or revise RT Bronchodilator order(s) to one equivalent RT bronchodilator order with every 4 hours Frequency and an Albuterol order with Frequency of every 2 hours PRN wheezing or increased work of breathing using Per Protocol order mode.          Electronically signed by Karina Hogan RCP on 2/10/2023 at 3:20 AM

## 2023-02-10 NOTE — PROGRESS NOTES
4 Eyes Skin Assessment     The patient is being assess for   Admission    I agree that 2 RN's have performed a thorough Head to Toe Skin Assessment on the patient. ALL assessment sites listed below have been assessed. Areas assessed for pressure by both nurses:   [x]   Head, Face, and Ears   [x]   Shoulders, Back, and Chest, Abdomen  [x]   Arms, Elbows, and Hands   [x]   Coccyx, Sacrum, and Ischium  [x]   Legs, Feet, and Heels        Skin Assessed Under all Medical Devices by both nurses:  none               All Mepilex Borders were peeled back and area peeked at by both nurses:  No: n/a  Please list where Mepilex Borders are located:  none           Patients skin was unremarkable    **SHARE this note so that the co-signing nurse is able to place an eSignature**    Co-signer eSignature: Electronically signed by Keisha Mcbride RN on 2/10/23 at 4:54 PM EST    Does the Patient have Skin Breakdown related to pressure?   No              Shaun Prevention initiated:  NA   Wound Care Orders initiated:  NA      [facility] nurse consulted for Pressure Injury (Stage 3,4, Unstageable, DTI, NWPT, Complex wounds)and New or Established Ostomies:  NA      Primary Nurse eSignature: Electronically signed by Korey Benson RN on 2/10/23 at 4:51 PM EST

## 2023-02-10 NOTE — PROGRESS NOTES
LAST [facility] OFFICE NOTE 23    Patient Name: Abdon Burns  Patient : 2000  Patient MRN: [id number]    Primary Oncologist: Ramesh Lyn  Referring Physician:        Date of Service: 2023      Chief Complaint  Sickle cell anemia      Problem List  Sickle cell anemia  Asthma  Back pain  Chronic pain  Vitamin D deficiency (disorder)    HPI  Mr Kadie Herbert is a 20year old  man who has sickle cell anemia. He has been managed by [facility] util this time. he is maintained on hydrea but has let his prescription run out He has not required frequent transfusions . He has had pain crises which he states happen every few months. He is treated with oxycodone during these times. he  had chest crisis in 2016He states he has an enlarged heart. 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 he was referred to [facility] dental clinic but I am uncertain as to whether he followed up According to notes he has not been consistent with hydrea and vitamin d replacement     Previous Therapies  see above    Current Therapy         Interval History  Javier returns for follow-up. He is having an occasional priapism, but is not having to go to the hospital for each 1. His pain is well controlled. Review of Systems  Constitutional:  No weight loss, No fever, No chills, No night sweats. Energy level good.   Eyes:  No impairment or change in vision  ENT / Mouth:  No pain, abnormal ulceration, bleeding, nasal drip or change in voice or hearing  Cardiovascular:  No chest pain, palpitations, new edema, or calf discomfort  Respiratory:  No pain, hemoptysis, change to breathing  Breast:  No pain, discharge, change in appearance or texture  Gastrointestinal:  No pain, cramping, jaundice, change to eating and bowel habits  Urinary:  No pain, bleeding or change in continence  Genitalia: No pain, bleeding or discharge  Musculoskeletal:  No redness, pain, edema or weakness  Skin:  No pruritus, rash, change to nodules or lesions  Neurologic:  No discomfort, change in mental status, speech, sensory or motor activity  Psychiatric:  No change in concentration or change to affect or mood  Endocrine:  No hot flashes, increased thirst, or change to urine production  Hematologic: No petechiae, ecchymosis or bleeding  Lymphatic:  No lymphadenopathy or lymphedema  Allergy / Immunologic:  No eczema, hives, frequent or recurrent infections      Vital Signs  Blood pressure: , Pulse: , Temperature: , Respirations: , O2 sat: , Pain Scale: , Height: , Weight: , BSA: , BMI:    Physical Exam    CONSTITUTIONAL: awake, alert, cooperative, no apparent distress   EYES: pupils equal, round and reactive to light, sclera clear and conjunctiva normal  ENT: Normocephalic, without obvious abnormality, atraumatic. His jaw does not appear to be swollen at this time. NECK: supple, symmetrical, no jugular venous distension and no carotid bruits   HEMATOLOGIC/LYMPHATIC: no cervical, supraclavicular or axillary lymphadenopathy   LUNGS: no increased work of breathing and clear to auscultation   CARDIOVASCULAR: regular rate and rhythm, normal S1 and S2, no murmur noted  ABDOMEN: normal bowel sounds x 4, soft, non-distended, non-tender, no masses palpated, no hepatosplenomegaly   MUSCULOSKELETAL: full range of motion noted, tone is normal  NEUROLOGIC: awake, alert, oriented to name, place and time. Motor skills grossly intact. SKIN: Normal skin color, texture, turgor and no jaundice.  appears intact   EXTREMITIES: Without cyanosis, clubbing, edema or asymmetry        Labs  CBC  Lab Results 01/11/2023 12/01/2022 10/04/2022 08/29/2022 07/29/2022 07/26/2022    CBC                 WBC x 10^3/uL     14.4 (H)   14.3 (H)       RBC x 10^6/uL     2.42 (L)   2.39 (L)       HGB g/dL     8.0 (L)   7.7 (L)       HCT %     23.0 (L)   21.3 (L)       MCV fL     95.0 (H)   89.1       MCH pg     33.1 (H)   32.2       MCHC g/dL     34.8   36.2       RDW-CV, %     20.4 (H)   19.8 (H)       PLT x 10^3/uL     417.0 (H)   474.0 (H)       Mee %     61.3   56.3       LY %     21.9   25.1       MO %     13.3 (H)   16.4 (H)       EO %     2.9   1.7       BA %     0.6   0.5       Mee # (ANC) x 10^3/uL     8.82 (H)   8.06 (H)       LY # x 10^3/uL     3.14   3.59 (H)       MO # x 10^3/uL     1.91 (H)   2.34 (H)       EO # x 10^3/uL     0.41   0.25       BA # x 10^3/uL     0.08   0.07   CMP  Lab Results 01/11/2023 12/01/2022 10/04/2022 08/29/2022 07/29/2022 07/26/2022    Chemistries                 Glucose mg/dL     89   83       BUN mg/dL     9   9       Creatinine mg/dL     0.96   0.94       BUN/Creatinine ratio     9.4   9.6       Sodium mmol/L     140   139       Potassium mmol/L     4.7   4.2       Chloride mmol/L     104   106       CO2 mmol/L     25.6   24.4       Anion gap, mmol/L     10.4   8.6       Calcium mg/dL     9.8   9.6       Albumin g/dL     4.4   4.3       Total protein g/dL     7.5   7.3       A/G ratio     1.4   1.4       Bilirubin, total mg/dL     8.7 (H)   5.1 (H)       Alkaline phosphatase U/L     87   82       AST/SGOT U/L     35 (H)   43 (H)       ALT/SGPT U/L     21   25       GFR non-African American, estimated mL/min/1.73m2     >60.0   >60.0       GFR African American, estimated mL/min/1.73m2     >60.0   >60.0                  Lab Results 01/11/2023 12/01/2022 10/04/2022 08/29/2022 07/29/2022 07/26/2022    Anemia Labs               Imaging      OCM - Patient Care Management    Pain, if applicable:        Performance Status: ECOG 1 Symptoms, but ambulatory. Restricted in physically strenuous activity, but ambulatory and able to carry out work of a light or sedentary nature (e.g., light housework, office work). (Date: 01/13/2023)     Depression Status: Was screened;  Outcome positive: No; Screening Date: 07/29/2022; Screening Tool: Patient Health Questionnaire (PHQ9)    Psycho-social PHQ-9 Follow-up Plan (if applicable):     Research    Would you like this patient screened for clinical trial eligibility? If yes, please enter the order for \"Research: Screen for Eligibility\"    Assessment & Plan    Sickle Cell Anemia  -He now agrees to go back on Hydrea  -He will start with 500 mg a day  -This will likely need to be increased  -Hopefully this will help with his priapism  -His compliance has been highly questionable previously    Tooth abscess:  -He still has it seen a dentist  -I told him this will recur if he does not      Secondary hemochromatosis:  -His ferritin is around 2000.  -This has been going relatively well.     Pain:  -Pain contract resigned 1/13/2022  -Pain plan updated 5/10/2022  -Pain contract resigned 12/6/2022  -Pain plan updated 12/6/2022  -Oxycodone is prescribed weekly, because he will take them too quickly if it is not administered in this manner  -He cannot find a pain specialist to take him  -He has been fired before from a pain specialist    Compliance:  -This has been a big issue  -He refuses to get counseling  -He does not want and refuses a support group  -He has not been able to find a pain specialist and was fired by at least 1  -He now admits that he has not looked for 1 very hard    Return to clinic in 3 months

## 2023-02-10 NOTE — CONSULTS
[facility]    Consult Note      Requesting Physician:  Dr. Chong Matthews:  Sickle cell disease      HISTORY OF PRESENT ILLNESS:    Mr. Jessei Lopez  is a 20 y.o. male we are seeing in consultation for sickle cell anemia. He presented with LLE pain that felt like a sickle cell crisis. He has had on-going issues with compliance and pain management. He visits the ER frequently every 1-2 weeks for pain management and priapism. He was not taking hydrea and restarted this last month but admits he usually does not take it. He is on oxycodone at home. His Hgb is 8.8. He is also reporting a painful erection this morning. ICD-10-CM    1. Sickle cell crisis (HCC)  D57.00       2.  Intractable pain  R52              Past Medical History:  Past Medical History:   Diagnosis Date    Accidental overdose     Asthma     DVT (deep venous thrombosis) ([facility] [address] 10/19/2021    R leg    Enlarged heart     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)     Sickle cell anemia (HCC)        Past Surgical History:  Past Surgical History:   Procedure Laterality Date    ABSCESS DRAINAGE Left 2012    leg    SPLENECTOMY         Current Medications:  Current Facility-Administered Medications   Medication Dose Route Frequency Provider Last Rate Last Admin    HYDROmorphone (DILAUDID) injection 1 mg  1 mg IntraVENous Q4H PRN Yisel Mattson MD   1 mg at 02/10/23 0401    HYDROmorphone HCl PF (DILAUDID) injection 1.5 mg  1.5 mg IntraVENous Q3H PRN Yisel Mattson MD   1.5 mg at 02/10/23 0810    albuterol sulfate HFA (PROVENTIL;VENTOLIN;PROAIR) 108 (90 Base) MCG/ACT inhaler 2 puff  2 puff Inhalation Q4H PRN Katrin Gonsalez MD        folic acid (FOLVITE) tablet 2 mg  2 mg Oral Daily Yisel Mattson MD        ibuprofen (ADVIL;MOTRIN) tablet 800 mg  800 mg Oral Q6H PRN Yisel Mattson MD        levalbuterol [place] [facility]) nebulizer solution 0.3 mg  0.3 mg Nebulization Q4H PRN Leatha Mckeon MD        pantoprazole (PROTONIX) tablet 40 mg  40 mg Oral QAM AC Tr Hoff MD   40 mg at 02/10/23 3683    sodium chloride flush 0.9 % injection 5-40 mL  5-40 mL IntraVENous 2 times per day Tr Hoff MD   10 mL at 02/10/23 0807    sodium chloride flush 0.9 % injection 5-40 mL  5-40 mL IntraVENous PRN Tr Hoff MD        0.9 % sodium chloride infusion   IntraVENous PRN Tr Hoff MD        enoxaparin (LOVENOX) injection 40 mg  40 mg SubCUTAneous Daily Tr Hoff MD        ondansetron (ZOFRAN-ODT) disintegrating tablet 4 mg  4 mg Oral Q8H PRN Tr Hoff MD        Or    ondansetron [facility]) injection 4 mg  4 mg IntraVENous Q6H PRN Tr Hoff MD        polyethylene glycol (GLYCOLAX) packet 17 g  17 g Oral Daily PRN Tr Hoff MD        acetaminophen (TYLENOL) tablet 650 mg  650 mg Oral Q6H PRN Tr Hoff MD        Or    acetaminophen (TYLENOL) suppository 650 mg  650 mg Rectal Q6H PRN Tr Hoff MD        0.9 % sodium chloride infusion   IntraVENous Continuous Tr Hoff MD 75 mL/hr at 02/10/23 0102 New Bag at 02/10/23 0102     Current Outpatient Medications   Medication Sig Dispense Refill    sildenafil (REVATIO) 20 MG tablet Take 1 tablet by mouth daily 30 tablet 0    sildenafil (REVATIO) 20 MG tablet Take 1 tablet by mouth daily 30 tablet 0    DULoxetine (CYMBALTA) 30 MG extended release capsule  (Patient not taking: No sig reported)      apixaban (ELIQUIS) 5 MG TABS tablet Take 1 tablet by mouth 2 times daily (Patient not taking: No sig reported) 60 tablet 0    pantoprazole (PROTONIX) 40 MG tablet Take 1 tablet by mouth every morning (before breakfast) (Patient not taking: Reported on 2/10/2023) 30 tablet 0    folic acid (FOLVITE) 1 MG tablet Take 2 tablets by mouth daily 30 tablet 3    oxyCODONE HCl (OXY-IR) 10 MG immediate release tablet Take 10 mg by mouth every 4 hours as needed for Pain.      albuterol sulfate HFA (PROVENTIL;VENTOLIN;PROAIR) 108 (90 Base) MCG/ACT inhaler Albuterol HFA Inhaler 90 mcg/actuation  inhaled  2 puffs prn     Active      levalbuterol (XOPENEX) 0.31 MG/3ML nebulization Levalbuterol Nebulized    2 puffs prn     Active      docusate (COLACE, DULCOLAX) 100 MG CAPS Take 100 mg by mouth (Patient not taking: No sig reported)      ibuprofen (ADVIL;MOTRIN) 800 MG tablet ibuprofen 800 MG Oral Tablet  oral   prn    Active         Allergies: Allergies   Allergen Reactions    Morphine Shortness Of Breath     Other reaction(s): Histamine-like Reaction  Has asthma exacerbation with morphine-histamine type reaction         Social History:  Social History     Socioeconomic History    Marital status: Single     Spouse name: Not on file    Number of children: 0    Years of education: Not on file    Highest education level: Not on file   Occupational History    Not on file   Tobacco Use    Smoking status: Never     Passive exposure: Never    Smokeless tobacco: Never   Vaping Use    Vaping Use: Never used   Substance and Sexual Activity    Alcohol use: Not Currently    Drug use: Never    Sexual activity: Not Currently     Partners: Female   Other Topics Concern    Not on file   Social History Narrative    Not on file     Social Determinants of Health     Financial Resource Strain: Not on file   Food Insecurity: Not on file   Transportation Needs: Not on file   Physical Activity: Not on file   Stress: Not on file   Social Connections: Not on file   Intimate Partner Violence: Not on file   Housing Stability: Not on file          Family History:  Family History   Problem Relation Age of Onset    Other Father         sarcoidosis       REVIEW OF SYSTEMS:      Constitutional: Denies fever, sweats, weight loss  Eyes: No visual changes or diplopia. No scleral icterus. Ent: No headaches, hearing loss or vertigo. No mouth sores or sore throat.   Cardiovascular: No chest pain, dyspnea on exertion, palpitations or loss of consciousness. Respiratory: No cough or wheezing, no sputum production. No hemoptysis. Gastrointestinal: No abdominal pain, appetite loss, blood in stools. No change in bowel habits. Genitourinary: No dysuria, trouble voiding, or hematuria. Musculoskeletal: No generalized weakness. No joint complaints. Integumentary: No rash or pruritus. Neurological: No headache, diplopia. No change in gait, balance, or coordination. No paresthesias. Endocrine: No temperature intolerance. No excessive thirst, fluid intake, urination. Hematologic/lymphatic: No abnormal bruising or ecchymosis, blood clots or swollen lymph nodes. Allergic/immunologic: No nasal congestion or hives. PHYSICAL EXAM:      Vitals:  Patient Vitals for the past 24 hrs:   BP Temp Temp src Pulse Resp SpO2 Height Weight   02/10/23 0745 (!) 117/51 98 °F (36.7 °C) Oral 74 16 95 % -- --   02/10/23 0357 (!) 118/49 -- -- -- -- 92 % -- --   02/10/23 0226 (!) 112/51 98 °F (36.7 °C) Oral 78 18 93 % 5' 9\" (1.753 m) 190 lb (86.2 kg)   02/10/23 0006 117/60 -- -- 82 14 91 % -- --   02/09/23 2302 (!) 115/55 -- -- 84 16 96 % -- --   02/09/23 2202 120/60 -- -- 87 18 98 % -- --   02/09/23 2132 (!) 124/59 -- -- -- -- 97 % -- --   02/09/23 1812 -- -- -- -- -- 91 % -- --   02/09/23 1809 127/60 99 °F (37.2 °C) Oral 85 16 -- 5' 9\" (1.753 m) 190 lb (86.2 kg)       Date 02/10/23 0000 - 02/10/23 2359   Shift 0413-7358 9844-8553 8495-5765 24 Hour Total   INTAKE   P.O.(mL/kg/hr) 240(0.3)   240   I. V.(mL/kg) 10(0.1)   10(0.1)   Shift Total(mL/kg) 250(2.9)   250(2.9)   OUTPUT   Urine(mL/kg/hr) 175(0.3)   175   Shift Total(mL/kg) 175(2)   175(2)   Weight (kg) 86.2 86.2 86.2 86.2       CONSTITUTIONAL: awake, alert, cooperative, no apparent distress   EYES: pupils equal, round and reactive to light, sclera clear and conjunctiva normal  ENT: Normocephalic, without obvious abnormality, atraumatic  NECK: supple, symmetrical, no jugular venous distension and no carotid bruits   HEMATOLOGIC/LYMPHATIC: no cervical, supraclavicular or axillary lymphadenopathy   LUNGS: no increased work of breathing and clear to auscultation   CARDIOVASCULAR: regular rate and rhythm, normal S1 and S2, no murmur noted  ABDOMEN: normal bowel sounds x 4, soft, non-distended, non-tender, no masses palpated, no hepatosplenomegaly   MUSCULOSKELETAL: full range of motion noted, tone is normal  NEUROLOGIC: awake, alert, oriented to name, place and time. Motor skills grossly intact. SKIN: Normal skin color, texture, turgor and no jaundice. appears intact   EXTREMITIES: no LE edema       DATA:    PT/INR:    Recent Labs     02/09/23  1853 01/28/23  1011 01/15/23  0839 01/11/23  1304   PROT 7.8 6.9 7.0 7.2     PTT:  No results for input(s): APTT in the last 720 hours. CMP:    Recent Labs     02/09/23 1853      K 5.0      CO2 23   BUN 11   PROT 7.8     Mg:  No results for input(s): MG in the last 720 hours. Lab Results   Component Value Date    CALCIUM 9.4 02/09/2023    PHOS 4.5 10/20/2021       CBC:    Recent Labs     02/09/23  2049 01/28/23  1011 01/15/23  0839 01/11/23  1304   WBC 15.2* 14.0* 14.1* 17.4*   NEUTROABS 13.4* 9.4* 7.8* 13.8*   LYMPHOPCT 9.0 17.0 31.0 10.3   RBC 2.43* 2.25* 2.39* 2.25*   HGB 8.8* 7.7* 8.4* 7.8*   HCT 23.6* 22.1* 23.3* 21.6*   MCV 97.0 98.1 97.8 96.2   MCH 36.0* 34.2* 35.4* 34.6*   MCHC 37.2* 34.9 36.2* 36.0   RDW 28.5* 24.5* 22.9* 24.5*    373 429 368        LDH:No results for input(s): LDH in the last 720 hours. Radiology Review:  XR CHEST PORTABLE  Narrative: EXAMINATION:  ONE XRAY VIEW OF THE CHEST    2/10/2023 12:58 am    COMPARISON:  11/21/2022    HISTORY:  ORDERING SYSTEM PROVIDED HISTORY: susp pna  TECHNOLOGIST PROVIDED HISTORY:  Reason for exam:->susp pna  Reason for Exam: poss pne    FINDINGS:  A single frontal view of the chest was performed. There is no acute skeletal  abnormality.   There is chronic sclerosis of the osseous structures, likely  secondary to patient history of sickle cell disease. The heart size is  mildly enlarged, though stable. The mediastinal contours are within normal  limits. The pulmonary vascularity is at the upper limits normal.  The lungs  are clear, without evidence of acute airspace consolidation, pneumothorax, or  pleural effusion. Impression: Stable chronic cardiomegaly, without acute airspace consolidation or CHF. Problem List  Patient Active Problem List   Diagnosis    Sickle cell pain crisis (HCC)    Asthma    Sickle cell anemia (HCC)    Sepsis ([facility] [address]    Opiate overdose (HCC)    Opiate antagonist poisoning, accidental or unintentional, initial encounter    Leukocytosis    Hypoxia    Anemia    Other chest pain    Pneumonia due to infectious organism    Fever    Chronic prescription opiate use    Right leg pain    Dental infection    Sickle cell crisis ([facility] [address]    History of DVT in adulthood    Overweight (BMI 25.0-29. 9)    Chest pain    Abnormal EKG    Acute chest syndrome due to sickle-cell disease ([facility] [address]    Priapism       IMPRESSION/RECOMMENDATIONS:    Sickle cell crisis:  -He is noncompliant and has not been taking his Hydrea-He quit taking his Hydrea  -IV crizanlizumab was stopped for non-compliance and lack of efficacy  -He refuses to see a pain specialist   -He refuses a support group as supplied by his insurance company  -His narcotics have to be carefully controlled for concern of an overdose.  -admitted for pain crisis  -IV dilaudid and toradol  -Hgb actually better than baseline  -IVF  - transfuse if Hgb dropping and symptoms persist      Priapism  - recurrent from sickle cell disease  - using ice pack currently   - will ask urology to see      Thank you for asking me to see the patient.        Damián Hill MD  Please Contact Through [employer]

## 2023-02-10 NOTE — FLOWSHEET NOTE
02/10/23 0226   Vital Signs   Temp 98 °F (36.7 °C)   Temp Source Oral   Heart Rate 78   Heart Rate Source Monitor   Resp 18   BP (!) 112/51   MAP (Calculated) 71   MAP (mmHg) 67   BP Location Left upper arm   Patient Position Semi fowlers   Level of Consciousness 0   MEWS Score 1   Oxygen Therapy   SpO2 93 %   O2 Device None (Room air)   Height and Weight   Height 5' 9\" (1.753 m)   Weight 190 lb (86.2 kg)   Weight Method Estimated   BSA (Calculated - sq m) 2.05 sq meters   BMI (Calculated) 28.1   Admission questions answered per pt report at this time. Assessment complete, see flowsheets. Pt aware to notify staff for any needs or changes, call light within reach. 4eyes deferred by pt, skin CDI upon visual assessment at this time. Patient is able to demonstrate the ability to move from a reclining position to an upright position within the recliner. Will continue to monitor.   Baldemar Reveles RN

## 2023-02-10 NOTE — CONSULTS
Urology Attending Consult Note      Reason for Consultation: priapism    CC: \"My left leg hurt and then I woke up this morning with an erection \"    History: 25 y/o male has sicle cell disease and recurrent priapism. He has been non-compliant and fails to f/u as outpatient. Presented yesterday with severe left leg pain prompting visit to emergency department. He stayed in the emergency department overnight and woke up this morning with a severe painful erection. ER gave ice and plan Sudafed, now the erection has detumescence       In the past seen Anneliese Borden MD.  At that time in 2022 - but he never followed up, no showed to his Oct 5th visit? Family History, Social History, Review of Systems:  Reviewed and agreed to as per chart    Vitals:  /66   Pulse 73   Temp 97.1 °F (36.2 °C) (Oral)   Resp 16   Ht 5' 9\" (1.753 m)   Wt 190 lb (86.2 kg)   SpO2 92%   BMI 28.06 kg/m²   Temp  Av.1 °F (36.7 °C)  Min: 97.1 °F (36.2 °C)  Max: 99 °F (37.2 °C)    Intake/Output Summary (Last 24 hours) at 2/10/2023 1656  Last data filed at 2/10/2023 0604  Gross per 24 hour   Intake 250 ml   Output 175 ml   Net 75 ml         Physical:  Well developed, well nourished in no acute distress  Mood indicates no abnormalities. Pt doesnt appear depressed  Orientated to time and place  Neck is supple, trachea is midline  Respiratory effort is normal  Cardiovascular show no extremity swelling  Abdomen no masses or hernias are palpated, there is no tenderness.  Skin show no abnormal lesions       Male :  Penis appears normal and circumcised, flacid but mildly tender   urethral meatus is normal in size and location  Scrotum appears normal and both testicles appear normal in size and location      Labs:  WBC:    Lab Results   Component Value Date/Time    WBC 15.2 2023 08:49 PM     Hemoglobin/Hematocrit:    Lab Results   Component Value Date/Time    HGB 8.8 2023 08:49 PM    HCT 23.6 2023 08:49 PM BMP:    Lab Results   Component Value Date/Time     02/09/2023 06:53 PM    K 5.0 02/09/2023 06:53 PM     02/09/2023 06:53 PM    CO2 23 02/09/2023 06:53 PM    BUN 11 02/09/2023 06:53 PM    LABALBU 4.6 02/09/2023 06:53 PM    CREATININE <0.5 02/09/2023 06:53 PM    CALCIUM 9.4 02/09/2023 06:53 PM    GFRAA >60 08/29/2022 02:12 AM    LABGLOM >60 02/09/2023 06:53 PM     PT/INR:    Lab Results   Component Value Date/Time    PROTIME 15.5 08/28/2022 06:48 AM    INR 1.24 08/28/2022 06:48 AM     PTT:    Lab Results   Component Value Date/Time    APTT 30.8 08/28/2022 06:48 AM   [APTT    Impression/Plan:   Sickle Cell anemia -left leg pain  Recurrent priapsim -  now in detumescence    plan in the past had been, sildenafil 20mg po qd -patient has not followed up with Kylie Valera MD to review this, cost may be an issue for the medication    Sudafed OTC prn for nighttime erections.  Ice packs, cold shower   I discussed baclofen for painful sleep related erections - hold off for now    F/u  1-2 weeks postdischarge, with Kylie Valera MD    We will sign off please call us back if there is change in status      Maico Best MD

## 2023-02-10 NOTE — FLOWSHEET NOTE
02/10/23 0745   Vital Signs   Temp 98 °F (36.7 °C)   Temp Source Oral   Heart Rate 74   Heart Rate Source Monitor   Resp 16   BP (!) 117/51   MAP (Calculated) 73   BP Location Left upper arm   BP Method Automatic   Patient Position High fowlers   Level of Consciousness 0   MEWS Score 1   Oxygen Therapy   SpO2 95 %   O2 Device None (Room air)     Pt assessment completed, vss, see flow sheet. Pt alert and oriented x 4. Pt denies any needs at this time.  Summer Hoover RN

## 2023-02-10 NOTE — ED PROVIDER NOTES
[address] ED  EMERGENCY DEPARTMENT ENCOUNTER        Pt Name: Jong Beth  MRN: [id number]  [place] 2000  Date of evaluation: 2/9/2023  Provider: Mariya Collins PA-C  PCP: Nelda Loomis MD  Note Started: 7:43 PM EST 2/9/23       I have seen and evaluated this patient with my supervising physician Musa Watson MD.      [facility]       Chief Complaint   Patient presents with    Leg Pain     EMS states pt has hx of sickle cell, states he is having a crisis, left leg pain       HISTORY OF PRESENT ILLNESS: 1 or more Elements     History From: Patient  Limitations to history : None    Jong Beth is a 20 y.o. male who presents to the emergency department for evaluation of pain at his left knee for the past 2 to 3 days states feels like sickle cell pain to him. No known injury. No calf pain or swelling. No fever no vomiting no chest pain or trouble breathing. Nursing Notes were all reviewed and agreed with or any disagreements were addressed in the HPI. REVIEW OF SYSTEMS :      Review of Systems   Constitutional:  Negative for fever. Respiratory:  Negative for cough and shortness of breath. Cardiovascular:  Negative for chest pain. Gastrointestinal:  Negative for vomiting. Musculoskeletal:         Left knee pain   Skin:  Negative for color change, rash and wound. Positives and Pertinent negatives as per HPI.      SURGICAL HISTORY     Past Surgical History:   Procedure Laterality Date    SPLENECTOMY         CURRENTMEDICATIONS       Previous Medications    ALBUTEROL SULFATE HFA (PROVENTIL;VENTOLIN;PROAIR) 108 (90 BASE) MCG/ACT INHALER    Albuterol HFA Inhaler 90 mcg/actuation  inhaled  2 puffs prn     Active    APIXABAN (ELIQUIS) 5 MG TABS TABLET    Take 1 tablet by mouth 2 times daily    DOCUSATE (COLACE, DULCOLAX) 100 MG CAPS    Take 100 mg by mouth    DULOXETINE (CYMBALTA) 30 MG EXTENDED RELEASE CAPSULE        FOLIC ACID (FOLVITE) 1 MG TABLET    Take 2 tablets by mouth daily    IBUPROFEN (ADVIL;MOTRIN) 800 MG TABLET    ibuprofen 800 MG Oral Tablet  oral   prn    Active    LEVALBUTEROL (XOPENEX) 0.31 MG/3ML NEBULIZATION    Levalbuterol Nebulized    2 puffs prn     Active    OXYCODONE HCL (OXY-IR) 10 MG IMMEDIATE RELEASE TABLET    Take 10 mg by mouth every 4 hours as needed for Pain. PANTOPRAZOLE (PROTONIX) 40 MG TABLET    Take 1 tablet by mouth every morning (before breakfast)    SILDENAFIL (REVATIO) 20 MG TABLET    Take 1 tablet by mouth daily    SILDENAFIL (REVATIO) 20 MG TABLET    Take 1 tablet by mouth daily       ALLERGIES     Morphine    FAMILYHISTORY       Family History   Problem Relation Age of Onset    Other Father         sarcoidosis        SOCIAL HISTORY       Social History     Tobacco Use    Smoking status: Never    Smokeless tobacco: Never   Vaping Use    Vaping Use: Never used   Substance Use Topics    Alcohol use: Not Currently    Drug use: Never       SCREENINGS        Blade Coma Scale  Eye Opening: Spontaneous  Best Motor Response: Obeys commands                CIWA Assessment  BP: 117/60  Heart Rate: 82           PHYSICAL EXAM  1 or more Elements     ED Triage Vitals   BP Temp Temp Source Heart Rate Resp SpO2 Height Weight   02/09/23 1809 02/09/23 1809 02/09/23 1809 02/09/23 1809 02/09/23 1809 02/09/23 1812 02/09/23 1809 02/09/23 1809   127/60 99 °F (37.2 °C) Oral 85 16 91 % 5' 9\" (1.753 m) 190 lb (86.2 kg)       Physical Exam  Vitals and nursing note reviewed. Constitutional:       Appearance: He is well-developed. He is not ill-appearing or toxic-appearing. HENT:      Head: Normocephalic and atraumatic. Mouth/Throat:      Mouth: Mucous membranes are moist.      Pharynx: Oropharynx is clear. No posterior oropharyngeal erythema. Cardiovascular:      Rate and Rhythm: Normal rate and regular rhythm. Heart sounds: Normal heart sounds.    Pulmonary:      Effort: Pulmonary effort is normal. No respiratory distress. Breath sounds: Normal breath sounds. No stridor. No wheezing, rhonchi or rales. Abdominal:      General: Bowel sounds are normal. There is no distension. Palpations: Abdomen is soft. Tenderness: There is no abdominal tenderness. There is no guarding. Musculoskeletal:      Comments: Mild tenderness to palpation at left medial knee. No obvious swelling. No erythema or skin changes. No calf tenderness or leg swelling. No palpable cords. Intact sensation. Distal pulses 2+. Skin:     General: Skin is warm and dry. Neurological:      Mental Status: He is alert and oriented to person, place, and time. GCS: GCS eye subscore is 4. GCS verbal subscore is 5. GCS motor subscore is 6. Sensory: Sensation is intact. Motor: Motor function is intact. No abnormal muscle tone.    Psychiatric:         Behavior: Behavior normal.           DIAGNOSTIC RESULTS   LABS:    Labs Reviewed   COMPREHENSIVE METABOLIC PANEL W/ REFLEX TO MG FOR LOW K - Abnormal; Notable for the following components:       Result Value    Glucose 111 (*)     Creatinine <0.5 (*)     Total Bilirubin 5.3 (*)     AST 67 (*)     All other components within normal limits    Narrative:     CALL  [facility] tel. [phone], recollect cristian Pennington, 02/09/2023 19:20, by PONCE   CBC WITH AUTO DIFFERENTIAL - Abnormal; Notable for the following components:    WBC 15.2 (*)     RBC 2.43 (*)     Hemoglobin 8.8 (*)     Hematocrit 23.6 (*)     MCH 36.0 (*)     MCHC 37.2 (*)     RDW 28.5 (*)     Neutrophils Absolute 13.4 (*)     nRBC 1 (*)     Macrocytes 2+ (*)     Polychromasia 2+ (*)     Schistocytes 3+ (*)     Target Cells 1+ (*)     Sickle Cells 3+ (*)     All other components within normal limits   URINALYSIS WITH REFLEX TO CULTURE - Abnormal; Notable for the following components:    Blood, Urine MODERATE (*)     Protein, UA 30 (*)     All other components within normal limits   MICROSCOPIC URINALYSIS - Abnormal; Notable for the following components:    Bacteria, UA Rare (*)     All other components within normal limits   COVID-19 & INFLUENZA COMBO   SPECIMEN REJECTION    Narrative:     [facility] tel. [phone], recollect cristian Pennington, 02/09/2023 19:20, by [address] [facility]       When ordered only abnormal lab results are displayed. All other labs were within normal range or not returned as of this dictation. EKG: When ordered, EKG's are interpreted by the Emergency Department Physician in the absence of a cardiologist.  Please see their note for interpretation of EKG. RADIOLOGY:   Non-plain film images such as CT, Ultrasound and MRI are read by the radiologist. Plain radiographic images are visualized and preliminarily interpreted by the ED Provider with the below findings:    Preliminary x-ray interpretation by myself  independently, in absence of radiologist (Final interpretation by radiologist to follow):      Left knee x-ray: No fracture no dislocation no joint effusion    Interpretation per the Radiologist below, if available at the time of this note:    XR KNEE LEFT (1-2 VIEWS)   Final Result   No acute abnormality of the knee. XR CHEST PORTABLE    (Results Pending)     XR KNEE LEFT (1-2 VIEWS)    Result Date: 2/9/2023  EXAMINATION: 2 XRAY VIEWS OF THE LEFT KNEE 2/9/2023 8:03 pm COMPARISON: 06/19/2022 HISTORY: ORDERING SYSTEM PROVIDED HISTORY: pain TECHNOLOGIST PROVIDED HISTORY: Reason for exam:->pain Reason for Exam: knee pain, pt has a h/o sickle cell disease, pt denies any injury to knee FINDINGS: No evidence of acute fracture or dislocation. No focal osseous lesion. No evidence of joint effusion. No focal soft tissue abnormality. No acute abnormality of the knee. No results found.     PROCEDURES   Unless otherwise noted below, none     Procedures    CRITICAL CARE TIME (.cctime)   0    PAST MEDICAL HISTORY      has a past medical history of Accidental overdose, Asthma, [facility] ([facility]) [facility] (10/19/2021), Enlarged heart, 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 ([facility] and Sickle cell anemia ([facility]      EMERGENCY DEPARTMENT COURSE and DIFFERENTIAL DIAGNOSIS/MDM:   Vitals:    Vitals:    02/09/23 2132 02/09/23 2202 02/09/23 2302 02/10/23 0006   BP: (!) 124/59 120/60 (!) 115/55 117/60   Pulse:  87 84 82   Resp:  18 16 14   Temp:       TempSrc:       SpO2: 97% 98% 96% 91%   Weight:       Height:           Patient was given the following medications:  Medications   HYDROmorphone (DILAUDID) injection 1 mg (has no administration in time range)   HYDROmorphone HCl PF (DILAUDID) injection 1.5 mg (has no administration in time range)   albuterol sulfate HFA (PROVENTIL;VENTOLIN;PROAIR) 108 (90 Base) MCG/ACT inhaler 2 puff (has no administration in time range)   apixaban (ELIQUIS) tablet 5 mg (has no administration in time range)   folic acid (FOLVITE) tablet 2 mg (has no administration in time range)   ibuprofen (ADVIL;MOTRIN) tablet 800 mg (has no administration in time range)   levalbuterol (XOPENEX) nebulizer solution 0.3 mg (has no administration in time range)   pantoprazole (PROTONIX) tablet 40 mg (has no administration in time range)   sildenafil (REVATIO) tablet 20 mg (has no administration in time range)   sodium chloride flush 0.9 % injection 5-40 mL (has no administration in time range)   sodium chloride flush 0.9 % injection 5-40 mL (has no administration in time range)   0.9 % sodium chloride infusion (has no administration in time range)   enoxaparin (LOVENOX) injection 40 mg (has no administration in time range)   ondansetron (ZOFRAN-ODT) disintegrating tablet 4 mg (has no administration in time range)     Or   ondansetron (ZOFRAN) injection 4 mg (has no administration in time range)   polyethylene glycol (GLYCOLAX) packet 17 g (has no administration in time range)   acetaminophen (TYLENOL) tablet 650 mg (has no administration in time range)     Or   acetaminophen (TYLENOL) suppository 650 mg (has no administration in time range)   0.9 % sodium chloride infusion ( IntraVENous New Bag 2/10/23 0102)   HYDROmorphone (DILAUDID) injection 1 mg (1 mg IntraVENous Given 2/9/23 2046)   ketorolac (TORADOL) injection 15 mg (15 mg IntraVENous Given 2/9/23 2046)   fentaNYL (SUBLIMAZE) injection 50 mcg (50 mcg IntraVENous Given 2/9/23 2139)   0.9 % sodium chloride bolus (0 mLs IntraVENous Stopped 2/9/23 2258)   oxyCODONE-acetaminophen (PERCOCET) 5-325 MG per tablet 1 tablet (1 tablet Oral Given 2/9/23 2258)   HYDROmorphone (DILAUDID) injection 1 mg (1 mg IntraVENous Given 2/10/23 0039)             Is this patient to be included in the SEP-1 Core Measure due to severe sepsis or septic shock? No   Exclusion criteria - the patient is NOT to be included for SEP-1 Core Measure due to: Infection is not suspected    Chronic Conditions affecting care:    has a past medical history of Accidental overdose, Asthma, DVT (deep venous thrombosis) ([facility] (10/19/2021), Enlarged heart, 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 ([facility] and Sickle cell anemia ([facility] CONSULTS: (Who and What was discussed)  IP CONSULT TO HOSPITALIST  IP CONSULT TO HEM/ONC          Records Reviewed (External and Source)   Patient admitted to [facility] inpatient 8/27/2022 for chest pain and sickle cell crisis. CC/HPI Summary, DDx, ED Course, and Reassessment:     Patient presented to the ER with complaint of left knee pain states feels like his sickle cell states has had pains in his joints like this before sickle cell crisis. No injury of the knee. No redness or any signs of joint infection on exam.  X-ray of his left knee is unremarkable. Basic labs obtained. Reticulocytes sent. He was given multiple doses of pain medication Dilaudid, Toradol, fentanyl and Percocet patient still having pain. Plan for admission due to intractable pain. I am the Primary Clinician of Record. FINAL IMPRESSION      1.  Sickle cell crisis ([facility]    2. Intractable pain          DISPOSITION/PLAN     DISPOSITION Admitted    PATIENT REFERRED TO:  No follow-up provider specified.     DISCHARGE MEDICATIONS:  New Prescriptions    No medications on file       DISCONTINUED MEDICATIONS:  Discontinued Medications    No medications on file              (Please note that portions of this note were completed with a voice recognition program.  Efforts were made to edit the dictations but occasionally words are mis-transcribed.)    Rylan Starr PA-C (electronically signed)          Tamie Ames PA-C  02/10/23 6049

## 2023-02-10 NOTE — ED NOTES
Dr. Kell martínez served at [address] [place] [address].      Annalisa Alvarez  02/10/23 0013

## 2023-02-11 LAB
ALBUMIN SERPL-MCNC: 4 G/DL (ref 3.4–5)
ALP BLD-CCNC: 70 U/L (ref 40–129)
ALT SERPL-CCNC: 13 U/L (ref 10–40)
ANION GAP SERPL CALCULATED.3IONS-SCNC: 10 MMOL/L (ref 3–16)
AST SERPL-CCNC: 29 U/L (ref 15–37)
BASOPHILS ABSOLUTE: 0.1 K/UL (ref 0–0.2)
BASOPHILS RELATIVE PERCENT: 0.6 %
BILIRUB SERPL-MCNC: 4.5 MG/DL (ref 0–1)
BILIRUBIN DIRECT: 0.4 MG/DL (ref 0–0.3)
BILIRUBIN, INDIRECT: 4.1 MG/DL (ref 0–1)
BUN BLDV-MCNC: 8 MG/DL (ref 7–20)
CALCIUM SERPL-MCNC: 9.3 MG/DL (ref 8.3–10.6)
CHLORIDE BLD-SCNC: 105 MMOL/L (ref 99–110)
CO2: 20 MMOL/L (ref 21–32)
CREAT SERPL-MCNC: <0.5 MG/DL (ref 0.9–1.3)
EKG ATRIAL RATE: 70 BPM
EKG DIAGNOSIS: NORMAL
EKG P AXIS: 39 DEGREES
EKG P-R INTERVAL: 148 MS
EKG Q-T INTERVAL: 404 MS
EKG QRS DURATION: 84 MS
EKG QTC CALCULATION (BAZETT): 436 MS
EKG R AXIS: 71 DEGREES
EKG T AXIS: 47 DEGREES
EKG VENTRICULAR RATE: 70 BPM
EOSINOPHILS ABSOLUTE: 0.3 K/UL (ref 0–0.6)
EOSINOPHILS RELATIVE PERCENT: 2 %
GFR SERPL CREATININE-BSD FRML MDRD: >60 ML/MIN/{1.73_M2}
GLUCOSE BLD-MCNC: 130 MG/DL (ref 70–99)
HCT VFR BLD CALC: 21.4 % (ref 40.5–52.5)
HEMOGLOBIN: 7.7 G/DL (ref 13.5–17.5)
LACTIC ACID: 1.5 MMOL/L (ref 0.4–2)
LYMPHOCYTES ABSOLUTE: 3.2 K/UL (ref 1–5.1)
LYMPHOCYTES RELATIVE PERCENT: 26 %
MCH RBC QN AUTO: 35.8 PG (ref 26–34)
MCHC RBC AUTO-ENTMCNC: 35.9 G/DL (ref 31–36)
MCV RBC AUTO: 99.6 FL (ref 80–100)
MONOCYTES ABSOLUTE: 1.3 K/UL (ref 0–1.3)
MONOCYTES RELATIVE PERCENT: 10.7 %
NEUTROPHILS ABSOLUTE: 7.5 K/UL (ref 1.7–7.7)
NEUTROPHILS RELATIVE PERCENT: 60.7 %
PDW BLD-RTO: 23.9 % (ref 12.4–15.4)
PLATELET # BLD: 322 K/UL (ref 135–450)
PMV BLD AUTO: 8.8 FL (ref 5–10.5)
POTASSIUM REFLEX MAGNESIUM: 4.1 MMOL/L (ref 3.5–5.1)
RBC # BLD: 2.15 M/UL (ref 4.2–5.9)
SODIUM BLD-SCNC: 135 MMOL/L (ref 136–145)
TOTAL PROTEIN: 6.7 G/DL (ref 6.4–8.2)
WBC # BLD: 12.4 K/UL (ref 4–11)

## 2023-02-11 PROCEDURE — 2580000003 HC RX 258: Performed by: HOSPITALIST

## 2023-02-11 PROCEDURE — 2060000000 HC ICU INTERMEDIATE R&B

## 2023-02-11 PROCEDURE — 93010 ELECTROCARDIOGRAM REPORT: CPT | Performed by: INTERNAL MEDICINE

## 2023-02-11 PROCEDURE — 85025 COMPLETE CBC W/AUTO DIFF WBC: CPT

## 2023-02-11 PROCEDURE — 6370000000 HC RX 637 (ALT 250 FOR IP): Performed by: HOSPITALIST

## 2023-02-11 PROCEDURE — 6370000000 HC RX 637 (ALT 250 FOR IP): Performed by: INTERNAL MEDICINE

## 2023-02-11 PROCEDURE — 83605 ASSAY OF LACTIC ACID: CPT

## 2023-02-11 PROCEDURE — 6360000002 HC RX W HCPCS: Performed by: HOSPITALIST

## 2023-02-11 PROCEDURE — 80076 HEPATIC FUNCTION PANEL: CPT

## 2023-02-11 PROCEDURE — 80048 BASIC METABOLIC PNL TOTAL CA: CPT

## 2023-02-11 PROCEDURE — 36415 COLL VENOUS BLD VENIPUNCTURE: CPT

## 2023-02-11 RX ORDER — SILDENAFIL CITRATE 20 MG/1
20 TABLET ORAL DAILY
Status: DISCONTINUED | OUTPATIENT
Start: 2023-02-11 | End: 2023-02-14 | Stop reason: HOSPADM

## 2023-02-11 RX ORDER — HYDROXYUREA 500 MG/1
1000 CAPSULE ORAL 2 TIMES DAILY
Status: DISCONTINUED | OUTPATIENT
Start: 2023-02-11 | End: 2023-02-14 | Stop reason: HOSPADM

## 2023-02-11 RX ORDER — OXYCODONE HYDROCHLORIDE 5 MG/1
10 TABLET ORAL EVERY 4 HOURS PRN
Status: DISCONTINUED | OUTPATIENT
Start: 2023-02-11 | End: 2023-02-14 | Stop reason: HOSPADM

## 2023-02-11 RX ADMIN — HYDROMORPHONE HYDROCHLORIDE 1.5 MG: 2 INJECTION, SOLUTION INTRAMUSCULAR; INTRAVENOUS; SUBCUTANEOUS at 08:42

## 2023-02-11 RX ADMIN — HYDROXYUREA 1000 MG: 500 CAPSULE ORAL at 21:52

## 2023-02-11 RX ADMIN — Medication 10 ML: at 20:58

## 2023-02-11 RX ADMIN — HYDROMORPHONE HYDROCHLORIDE 1.5 MG: 2 INJECTION, SOLUTION INTRAMUSCULAR; INTRAVENOUS; SUBCUTANEOUS at 20:58

## 2023-02-11 RX ADMIN — HYDROMORPHONE HYDROCHLORIDE 1.5 MG: 2 INJECTION, SOLUTION INTRAMUSCULAR; INTRAVENOUS; SUBCUTANEOUS at 17:52

## 2023-02-11 RX ADMIN — HYDROMORPHONE HYDROCHLORIDE 1.5 MG: 2 INJECTION, SOLUTION INTRAMUSCULAR; INTRAVENOUS; SUBCUTANEOUS at 14:50

## 2023-02-11 RX ADMIN — SODIUM CHLORIDE: 9 INJECTION, SOLUTION INTRAVENOUS at 15:37

## 2023-02-11 RX ADMIN — FOLIC ACID 2 MG: 1 TABLET ORAL at 08:42

## 2023-02-11 RX ADMIN — HYDROMORPHONE HYDROCHLORIDE 1.5 MG: 2 INJECTION, SOLUTION INTRAMUSCULAR; INTRAVENOUS; SUBCUTANEOUS at 02:29

## 2023-02-11 RX ADMIN — SILDENAFIL CITRATE 20 MG: 20 TABLET ORAL at 15:32

## 2023-02-11 RX ADMIN — HYDROMORPHONE HYDROCHLORIDE 1.5 MG: 2 INJECTION, SOLUTION INTRAMUSCULAR; INTRAVENOUS; SUBCUTANEOUS at 11:43

## 2023-02-11 RX ADMIN — Medication 10 ML: at 08:45

## 2023-02-11 RX ADMIN — HYDROMORPHONE HYDROCHLORIDE 1.5 MG: 2 INJECTION, SOLUTION INTRAMUSCULAR; INTRAVENOUS; SUBCUTANEOUS at 05:38

## 2023-02-11 ASSESSMENT — PAIN DESCRIPTION - LOCATION
LOCATION: KNEE;LEG
LOCATION: LEG
LOCATION: KNEE;LEG
LOCATION: KNEE
LOCATION: KNEE;LEG

## 2023-02-11 ASSESSMENT — PAIN DESCRIPTION - DESCRIPTORS
DESCRIPTORS: ACHING;SHARP
DESCRIPTORS: ACHING;SHOOTING

## 2023-02-11 ASSESSMENT — PAIN SCALES - GENERAL
PAINLEVEL_OUTOF10: 8
PAINLEVEL_OUTOF10: 8
PAINLEVEL_OUTOF10: 9
PAINLEVEL_OUTOF10: 8
PAINLEVEL_OUTOF10: 0

## 2023-02-11 ASSESSMENT — PAIN DESCRIPTION - ORIENTATION
ORIENTATION: LEFT

## 2023-02-11 ASSESSMENT — PAIN DESCRIPTION - ONSET
ONSET: ON-GOING
ONSET: ON-GOING

## 2023-02-11 ASSESSMENT — PAIN DESCRIPTION - PAIN TYPE
TYPE: ACUTE PAIN
TYPE: ACUTE PAIN

## 2023-02-11 NOTE — FLOWSHEET NOTE
02/10/23 2058   Assessment   Charting Type Shift assessment   Psychosocial   Psychosocial (WDL) X   Patient Behaviors Withdrawn;Tearful   Neurological   Neuro (WDL) WDL   [place] Coma Scale   Eye Opening 4   Best Verbal Response 5   Best Motor Response 6   Blade Coma Scale Score 15   HEENT (Head, Ears, Eyes, Nose, & Throat)   HEENT (WDL) WDL   Respiratory   Respiratory (WDL) WDL   Gastrointestinal   Abdominal (WDL) WDL   Genitourinary   Genitourinary (WDL) WDL   Urine Assessment   Urinary Status Voiding   Genitalia   Male Genitalia Priapism  (at admission)   Peripheral Vascular   Peripheral Vascular (WDL) WDL   Edema None   Skin Integumentary    Skin Integumentary (WDL) WDL   Musculoskeletal   Musculoskeletal (WDL) WDL   Handoff   Communication Given Shift Handoff   Handoff Given To Perla   Handoff Received From Rishi   Handoff Communication Face to Face;In department;At bedside   Time Handoff Given 1910   End of Shift Check Performed Yes

## 2023-02-11 NOTE — PROGRESS NOTES
IM Progress Note    Admit Date:  2/9/2023    Admitted with sickle cell crisis and priapism. Seen by hematology as well as urology. Subjective:  Mr. Ralph Jovel is currently ambulating in the room. Complaints of persistent knee pain. Requiring Dilaudid. Continued on IV fluids. Hemoglobin down to 7.7    Objective:   /65   Pulse 89   Temp 98.4 °F (36.9 °C) (Oral)   Resp 18   Ht 5' 9\" (1.753 m)   Wt 186 lb 8 oz (84.6 kg)   SpO2 95%   BMI 27.54 kg/m²     Intake/Output Summary (Last 24 hours) at 2/11/2023 1500  Last data filed at 2/11/2023 0842  Gross per 24 hour   Intake 1490 ml   Output 0 ml   Net 1490 ml         Physical Exam:  General:  Awake, alert, NAD  Skin:  Warm and dry  Neck:  JVD absent. Neck supple  Chest:  Clear to auscultation, respiration easy. No wheezes, rales or rhonchi. Cardiovascular:  RRR ,S1S2 normal  Abdomen:  Soft, non tender, non distended, BS +  Extremities:  No edema. Left knee tender  Intact peripheral pulses. Brisk cap refill, < 2 secs  Neuro: non focal      Medications:   Scheduled Meds:   sildenafil  20 mg Oral Daily    hydroxyurea  1,000 mg Oral BID    folic acid  2 mg Oral Daily    pantoprazole  40 mg Oral QAM AC    sodium chloride flush  5-40 mL IntraVENous 2 times per day    enoxaparin  40 mg SubCUTAneous Daily       Continuous Infusions:   sodium chloride      sodium chloride 75 mL/hr at 02/10/23 0102       Data:  CBC:   Recent Labs     02/09/23 2049 02/11/23  0423   WBC 15.2* 12.4*   RBC 2.43* 2.15*   HGB 8.8* 7.7*   HCT 23.6* 21.4*   MCV 97.0 99.6   RDW 28.5* 23.9*    322     BMP:   Recent Labs     02/09/23  1853 02/11/23  0423    135*   K 5.0 4.1    105   CO2 23 20*   BUN 11 8   CREATININE <0.5* <0.5*     BNP: No results for input(s): BNP in the last 72 hours. PT/INR: No results for input(s): PROTIME, INR in the last 72 hours. APTT: No results for input(s): APTT in the last 72 hours.   CARDIAC ENZYMES:   Recent Labs     02/10/23  1123 02/10/23  1741   TROPONINI <0.01 <0.01     FASTING LIPID PANEL:No results found for: CHOL, HDL, TRIG  LIVER PROFILE:   Recent Labs     02/09/23  1853 02/11/23  0423   AST 67* 29   ALT 18 13   BILIDIR  --  0.4*   BILITOT 5.3* 4.5*   ALKPHOS 79 70          Cultures  COVID/Influenza: not detected       Radiology  XR CHEST PORTABLE   Final Result   Stable chronic cardiomegaly, without acute airspace consolidation or CHF. XR KNEE LEFT (1-2 VIEWS)   Final Result   No acute abnormality of the knee. 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 *      Plan:    #Sickle cell crisis  -presented with c/o left knee pain  -imaging non-acute  -longstanding issue of med non-compliance   -hgb stable-> monitor H&H and transfuse if dropping and symptoms persist  -IVF and pain control w/ dilaudid and toradol  -hemonc following   Seen in consultation by heme-onc. Supposed to be on chronic Hydrea for sickle cell anemia. Concern about compliance. Clarified dose with patient -he says he takes Hydrea 1000 mg twice daily- will resume the same. Patient takes Oxy at home. We will try to switch him to his oral pain medications    #Priapism  -recurrent ischemic priapism 2/2 sickle cell  -non-compliant w/ prescribed sildenafil  -urology consulted   -has had success in past with sudafed, alkalinization, ice packs and supplemental O2  -sudafed x1 ordered with resolution  -will monitor   This is resolved ; seen by urology.    recommendation is to continue sildenafil 20 mg daily, and Sudafed as needed and follow-up as needed     #Chest pain  -reports acute onset CP 2 days ago, spontaneously resolved  -no associated SOB  -EKG NSR  -trop neg      #LFT elevation, chronic  -bilirubin 5.3; AST 67  -likely 2/2 sickle cell  -patient denies RUQ pain  -Monitor     #Asthma  -without acute exacerbation   -continue home inhalers     #GERD  -no longer taking PPI      #Vitamin D deficiency         DVT Prophylaxis: Lovenox  Diet: ADULT DIET; Regular  Code Status: Full Code       DC plans for tomorrow.    He has a management plan in place ; he will not get narcotics on discharge    Katrin Gonsalez MD   2/11/2023 3:00 PM

## 2023-02-11 NOTE — PROGRESS NOTES
Pt. Heart rate 50 dropped as low as 38 came right back up to 51. Upon entering room pt. Sleeping when awoke denies any chest pain or shortness of breath and heart rate increased to 80's. Will continue to monitor.

## 2023-02-11 NOTE — FLOWSHEET NOTE
02/11/23 0845   Vital Signs   Temp 97.7 °F (36.5 °C)   Temp Source Oral   Heart Rate 64   Heart Rate Source Monitor   Resp 18   BP (!) 107/54   MAP (Calculated) 72   Patient Position Semi fowlers   Level of Consciousness 0   MEWS Score 1   Oxygen Therapy   SpO2 92 %   O2 Device None (Room air)   Pt. Resting in bed. Call light in reach. Shift assessment completed see flow sheet. Denies any needs at this time. Will continue to monitor.

## 2023-02-11 NOTE — PROGRESS NOTES
Pain currently well controlled with PRN Dilaudid 1.5 mg Q3. VSS. Patient reports all needs are met at this time. Will continue to monitor.

## 2023-02-12 LAB
ANION GAP SERPL CALCULATED.3IONS-SCNC: 10 MMOL/L (ref 3–16)
BUN BLDV-MCNC: 10 MG/DL (ref 7–20)
CALCIUM SERPL-MCNC: 9.4 MG/DL (ref 8.3–10.6)
CHLORIDE BLD-SCNC: 107 MMOL/L (ref 99–110)
CO2: 21 MMOL/L (ref 21–32)
CREAT SERPL-MCNC: 0.6 MG/DL (ref 0.9–1.3)
GFR SERPL CREATININE-BSD FRML MDRD: >60 ML/MIN/{1.73_M2}
GLUCOSE BLD-MCNC: 98 MG/DL (ref 70–99)
HCT VFR BLD CALC: 20.1 % (ref 40.5–52.5)
HEMOGLOBIN: 7.2 G/DL (ref 13.5–17.5)
MCH RBC QN AUTO: 35.6 PG (ref 26–34)
MCHC RBC AUTO-ENTMCNC: 35.8 G/DL (ref 31–36)
MCV RBC AUTO: 99.3 FL (ref 80–100)
PDW BLD-RTO: 22.4 % (ref 12.4–15.4)
PLATELET # BLD: 291 K/UL (ref 135–450)
PMV BLD AUTO: 8.6 FL (ref 5–10.5)
POTASSIUM REFLEX MAGNESIUM: 4.2 MMOL/L (ref 3.5–5.1)
RBC # BLD: 2.02 M/UL (ref 4.2–5.9)
SODIUM BLD-SCNC: 138 MMOL/L (ref 136–145)
WBC # BLD: 12.2 K/UL (ref 4–11)

## 2023-02-12 PROCEDURE — 2060000000 HC ICU INTERMEDIATE R&B

## 2023-02-12 PROCEDURE — 2580000003 HC RX 258: Performed by: HOSPITALIST

## 2023-02-12 PROCEDURE — 80048 BASIC METABOLIC PNL TOTAL CA: CPT

## 2023-02-12 PROCEDURE — 6370000000 HC RX 637 (ALT 250 FOR IP): Performed by: INTERNAL MEDICINE

## 2023-02-12 PROCEDURE — 36415 COLL VENOUS BLD VENIPUNCTURE: CPT

## 2023-02-12 PROCEDURE — 6360000002 HC RX W HCPCS: Performed by: HOSPITALIST

## 2023-02-12 PROCEDURE — 6370000000 HC RX 637 (ALT 250 FOR IP): Performed by: HOSPITALIST

## 2023-02-12 PROCEDURE — 85027 COMPLETE CBC AUTOMATED: CPT

## 2023-02-12 RX ADMIN — HYDROMORPHONE HYDROCHLORIDE 1 MG: 1 INJECTION, SOLUTION INTRAMUSCULAR; INTRAVENOUS; SUBCUTANEOUS at 00:05

## 2023-02-12 RX ADMIN — Medication 10 ML: at 08:04

## 2023-02-12 RX ADMIN — OXYCODONE HYDROCHLORIDE 10 MG: 5 TABLET ORAL at 20:25

## 2023-02-12 RX ADMIN — OXYCODONE HYDROCHLORIDE 10 MG: 5 TABLET ORAL at 11:09

## 2023-02-12 RX ADMIN — HYDROMORPHONE HYDROCHLORIDE 1.5 MG: 2 INJECTION, SOLUTION INTRAMUSCULAR; INTRAVENOUS; SUBCUTANEOUS at 12:43

## 2023-02-12 RX ADMIN — HYDROMORPHONE HYDROCHLORIDE 1.5 MG: 2 INJECTION, SOLUTION INTRAMUSCULAR; INTRAVENOUS; SUBCUTANEOUS at 16:22

## 2023-02-12 RX ADMIN — FOLIC ACID 2 MG: 1 TABLET ORAL at 08:04

## 2023-02-12 RX ADMIN — SODIUM CHLORIDE: 9 INJECTION, SOLUTION INTRAVENOUS at 06:25

## 2023-02-12 RX ADMIN — SILDENAFIL CITRATE 20 MG: 20 TABLET ORAL at 08:04

## 2023-02-12 RX ADMIN — PANTOPRAZOLE SODIUM 40 MG: 40 TABLET, DELAYED RELEASE ORAL at 06:25

## 2023-02-12 RX ADMIN — SODIUM CHLORIDE: 9 INJECTION, SOLUTION INTRAVENOUS at 20:30

## 2023-02-12 RX ADMIN — HYDROXYUREA 1000 MG: 500 CAPSULE ORAL at 08:03

## 2023-02-12 RX ADMIN — OXYCODONE HYDROCHLORIDE 10 MG: 5 TABLET ORAL at 06:25

## 2023-02-12 RX ADMIN — HYDROMORPHONE HYDROCHLORIDE 1.5 MG: 2 INJECTION, SOLUTION INTRAMUSCULAR; INTRAVENOUS; SUBCUTANEOUS at 03:11

## 2023-02-12 RX ADMIN — HYDROMORPHONE HYDROCHLORIDE 1.5 MG: 2 INJECTION, SOLUTION INTRAMUSCULAR; INTRAVENOUS; SUBCUTANEOUS at 21:36

## 2023-02-12 RX ADMIN — HYDROMORPHONE HYDROCHLORIDE 1.5 MG: 2 INJECTION, SOLUTION INTRAMUSCULAR; INTRAVENOUS; SUBCUTANEOUS at 08:03

## 2023-02-12 RX ADMIN — OXYCODONE HYDROCHLORIDE 10 MG: 5 TABLET ORAL at 15:13

## 2023-02-12 RX ADMIN — HYDROXYUREA 1000 MG: 500 CAPSULE ORAL at 20:25

## 2023-02-12 ASSESSMENT — PAIN DESCRIPTION - LOCATION
LOCATION: KNEE
LOCATION: KNEE;LEG
LOCATION: KNEE
LOCATION: KNEE;LEG
LOCATION: KNEE
LOCATION: KNEE;LEG
LOCATION: KNEE;LEG
LOCATION: KNEE
LOCATION: KNEE;LEG
LOCATION: LEG

## 2023-02-12 ASSESSMENT — PAIN SCALES - GENERAL
PAINLEVEL_OUTOF10: 10
PAINLEVEL_OUTOF10: 10
PAINLEVEL_OUTOF10: 8
PAINLEVEL_OUTOF10: 7
PAINLEVEL_OUTOF10: 9
PAINLEVEL_OUTOF10: 8
PAINLEVEL_OUTOF10: 7
PAINLEVEL_OUTOF10: 8
PAINLEVEL_OUTOF10: 8
PAINLEVEL_OUTOF10: 7

## 2023-02-12 ASSESSMENT — PAIN DESCRIPTION - DESCRIPTORS
DESCRIPTORS: ACHING
DESCRIPTORS: ACHING;SHARP
DESCRIPTORS: ACHING;SHARP
DESCRIPTORS: ACHING
DESCRIPTORS: ACHING;SHARP
DESCRIPTORS: ACHING;SHARP
DESCRIPTORS: SHARP;ACHING
DESCRIPTORS: ACHING;SHARP
DESCRIPTORS: ACHING
DESCRIPTORS: SHARP;ACHING

## 2023-02-12 ASSESSMENT — PAIN DESCRIPTION - ONSET
ONSET: ON-GOING
ONSET: ON-GOING

## 2023-02-12 ASSESSMENT — PAIN DESCRIPTION - ORIENTATION
ORIENTATION: LEFT

## 2023-02-12 ASSESSMENT — PAIN DESCRIPTION - PAIN TYPE
TYPE: ACUTE PAIN
TYPE: ACUTE PAIN

## 2023-02-12 NOTE — PROGRESS NOTES
IM Progress Note    Admit Date:  2/9/2023    Admitted with sickle cell crisis and priapism. Seen by hematology as well as urology. Subjective:  Mr. Greer Rocha complains of persistent left knee pain . He is a moderate distress . Hemoglobin slowly trending down  . Patient is being hydrated though. Continue pain medications-Dilaudid and oxycodone prn      Objective:   BP (!) 101/59   Pulse 68   Temp 98.2 °F (36.8 °C) (Oral)   Resp 18   Ht 5' 9\" (1.753 m)   Wt 186 lb 8 oz (84.6 kg)   SpO2 94%   BMI 27.54 kg/m²   No intake or output data in the 24 hours ending 02/12/23 1142        Physical Exam:  General:  Awake, alert, NAD  Skin:  Warm and dry  Neck:  JVD absent. Neck supple  Chest:  Clear to auscultation, respiration easy. No wheezes, rales or rhonchi. Cardiovascular:  RRR ,S1S2 normal  Abdomen:  Soft, non tender, non distended, BS +  Extremities:  No edema. Left knee tender  Intact peripheral pulses. Brisk cap refill, < 2 secs  Neuro: non focal      Medications:   Scheduled Meds:   sildenafil  20 mg Oral Daily    hydroxyurea  1,000 mg Oral BID    folic acid  2 mg Oral Daily    pantoprazole  40 mg Oral QAM AC    sodium chloride flush  5-40 mL IntraVENous 2 times per day    enoxaparin  40 mg SubCUTAneous Daily       Continuous Infusions:   sodium chloride      sodium chloride 75 mL/hr at 02/12/23 0625       Data:  CBC:   Recent Labs     02/09/23  2049 02/11/23  0423 02/12/23  0442   WBC 15.2* 12.4* 12.2*   RBC 2.43* 2.15* 2.02*   HGB 8.8* 7.7* 7.2*   HCT 23.6* 21.4* 20.1*   MCV 97.0 99.6 99.3   RDW 28.5* 23.9* 22.4*    322 291       BMP:   Recent Labs     02/09/23  1853 02/11/23  0423 02/12/23  0442    135* 138   K 5.0 4.1 4.2    105 107   CO2 23 20* 21   BUN 11 8 10   CREATININE <0.5* <0.5* 0.6*       BNP: No results for input(s): BNP in the last 72 hours. PT/INR: No results for input(s): PROTIME, INR in the last 72 hours. APTT: No results for input(s):  APTT in the last 72 hours.  CARDIAC ENZYMES:   Recent Labs     02/10/23  1123 02/10/23  1741   TROPONINI <0.01 <0.01       FASTING LIPID PANEL:No results found for: CHOL, HDL, TRIG  LIVER PROFILE:   Recent Labs     02/09/23  1853 02/11/23  0423   AST 67* 29   ALT 18 13   BILIDIR  --  0.4*   BILITOT 5.3* 4.5*   ALKPHOS 79 70            Cultures  COVID/Influenza: not detected       Radiology  XR CHEST PORTABLE   Final Result   Stable chronic cardiomegaly, without acute airspace consolidation or CHF. XR KNEE LEFT (1-2 VIEWS)   Final Result   No acute abnormality of the knee. 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 *      Plan:    #Sickle cell crisis  #Sickle cell anemia  -presented with c/o left knee pain  -imaging non-acute  -longstanding issue of med non-compliance   -Hemoglobin-> 8.8 -> 7.7 --> 7.2.  monitor H&H and transfuse if dropping and symptoms persist.  By hematology transfuse for hemoglobin less than 6. -IVF and pain control w/ dilaudid and toradol  -hemonc following   Seen in consultation by heme-onc. Supposed to be on chronic Hydrea for sickle cell anemia. Concern about compliance. Clarified dose with patient -he says he takes Hydrea 1000 mg twice daily-I have resumed this. Patient takes Oxy at home. We will try to switch him to his oral pain medications    #Priapism  -recurrent ischemic priapism 2/2 sickle cell  -non-compliant w/ prescribed sildenafil  -urology consulted   -has had success in past with sudafed, alkalinization, ice packs and supplemental O2  -sudafed x1 ordered with resolution  -will monitor   This is resolved ; seen by urology.    recommendation is to continue sildenafil 20 mg daily, and Sudafed as needed and follow-up as needed     #Chest pain  -reports acute onset CP 2 days ago, spontaneously resolved  -no associated SOB  -EKG NSR  -trop neg      #LFT elevation, chronic  -bilirubin 5.3; AST 67  -likely 2/2 sickle cell  -patient denies RUQ pain  -Monitor-improving     #Asthma  -without acute exacerbation   -continue home inhalers     #GERD  -no longer taking PPI      #Vitamin D deficiency         DVT Prophylaxis: Lovenox  Diet: ADULT DIET; Regular  Code Status: Full Code     Pt  has a management plan in place ; he will not get narcotics on discharge. Patient complains of persistent pain, will await hematology reevaluation in a.m.     Ashely Fitzgerald MD   2/12/2023 11:42 AM

## 2023-02-12 NOTE — CARE COORDINATION
Case Management Assessment  Initial Evaluation    Date/Time of Evaluation: 2/12/2023 1:15 PM  Assessment Completed by: BC Lam  Case Management Department  Phone: [phone] Fax: [phone]      If patient is discharged prior to next notation, then this note serves as note for discharge by case management. Patient Name: [facility]                   YOB: 2000  Diagnosis: Sickle cell crisis ([address] [D57.00]  Intractable pain [R52]                   Date / Time: 2/9/2023  6:04 PM    Patient Admission Status: Inpatient   Readmission Risk (Low < 19, Mod (19-27), High > 27): Readmission Risk Score: 31.4    Current PCP: Serafin Hamm MD  PCP verified by CM? Yes    Chart Reviewed: Yes      History Provided by: Patient  Patient Orientation: Alert and Oriented    Patient Cognition: Alert    Hospitalization in the last 30 days (Readmission):  No    If yes, Readmission Assessment in CM Navigator will be completed. Advance Directives:      Code Status: Full Code   Patient's Primary Decision Maker is: Legal Next of Kin    Primary Decision Maker: Berkley Javed - Parent - [phone]    Discharge Planning:    Patient lives with: Parent Type of Home: House  Primary Care Giver: Self  Patient Support Systems include: Parent, Family Members   Current Financial resources: Medicaid ([employer] and caresource)  Current community resources: None  Current services prior to admission: None            Current DME:              Type of Home Care services:  None    ADLS  Prior functional level: Independent in ADLs/IADLs  Current functional level: Independent in ADLs/IADLs    PT AM-PAC:   /24  OT AM-PAC:   /24    Family can provide assistance at DC: Yes  Would you like Case Management to discuss the discharge plan with any other family members/significant others, and if so, who?  Yes  Plans to Return to Present Housing: Yes  Other Identified Issues/Barriers to RETURNING to current housing: n/a  Potential Assistance needed at discharge: N/A            Potential DME:    Patient expects to discharge to: [facility] for transportation at discharge:      Financial    Payor: Jordon Fisher / Plan: Jordon NAILS CHOICE POS II / Product Type: *No Product type* /     Does insurance require precert for SNF: Yes    Potential assistance Purchasing Medications: No  Meds-to-Beds request:        [facility] [id number]ier[address], [address] [facility] [phone] - F [phone]  [address] [facility] [address]  Phone: [phone] Fax: [address]  [address] [facility] [address] [facility]  Phone: [phone] Fax: [phone]    [facility] [address] [facility] [phone] Donnice Frankel [phone]  [address] [facility] [address] [facility] [address]  Phone: [phone] Fax: [phone]      Notes:    Factors facilitating achievement of predicted outcomes: Family support, Motivated, and Pleasant    Barriers to discharge: n/a    Additional Case Management Notes: Chart review completed. Met with pt at bedside. Pt stated he is independent at home with his ADL's and will return home. He doesn't anticipate needing skilled home care for RN/PT/OT. CM will follow. Please notify CM if needs or concerns arise.      The Plan for Transition of Care is related to the following treatment goals of Sickle cell crisis ([address] [D57.00]  Intractable pain [R52]    Vijaya ROWE, ERYN-S  Case Management Department  Phone: [phone] Fax: [phone]

## 2023-02-12 NOTE — FLOWSHEET NOTE
02/12/23 0745 02/12/23 0800   Vital Signs   Temp 98.2 °F (36.8 °C)  --    Temp Source Oral  --    Heart Rate 68  --    Heart Rate Source Monitor  --    Resp 16  --    BP (!) 101/59  --    MAP (Calculated) 73  --    Level of Consciousness 0 0   MEWS Score 1  --    Oxygen Therapy   SpO2 94 %  --    O2 Device None (Room air)  --    Pt. Resting in bed. Call light in reach. Shift assessment completed see flow sheet. Denies any needs at this time. Will continue to monitor.

## 2023-02-12 NOTE — ACP (ADVANCE CARE PLANNING)
Advance Care Planning     General Advance Care Planning (ACP) Conversation    Date of Conversation: 2/9/2023  Conducted with: Patient with Decision Making Capacity    Healthcare Decision Maker:    Primary Decision Maker: Berkley Javed - [facility] - [phone]  Click here to complete Healthcare Decision Makers including selection of the Healthcare Decision Maker Relationship (ie \"Primary\"). Today we documented Decision Maker(s) consistent with Legal Next of Kin hierarchy.     Content/Action Overview:    Reviewed DNR/DNI and patient elects Full Code (Attempt Resuscitation)  ventilation preferences  Pt stated he would want a vent if medically necessary     Length of Voluntary ACP Conversation in minutes:  <16 minutes (Non-Billable)    BC José  Case Management Department  Phone: [phone] Fax: [phone]

## 2023-02-12 NOTE — PROGRESS NOTES
Reached out to hematology and spoke to Dr. Nina Delgado per Dr. Garland Mathew request about Hgb 7.2 and Critical Hct of 20.1 because he is a sickle cell pt. Dr. Nina Delgado stated Protocol for transfusion in a sickle cell pt. Is is based on hemoglobin not hematocrit. He only needs transfusion if Hgb less than 6. No new orders received at this time will continue to monitor.

## 2023-02-12 NOTE — PLAN OF CARE
Problem: ABCDS Injury Assessment  Goal: Absence of physical injury  2/11/2023 2141 by Ted Putnam RN  Outcome: Progressing     Problem: Pain  Goal: Verbalizes/displays adequate comfort level or baseline comfort level  2/11/2023 2141 by Ted Putnam RN  Outcome: Progressing   Will continue to monitor and care per plan protocol.

## 2023-02-13 LAB
A/G RATIO: 1.7 (ref 1.1–2.2)
ABO/RH: NORMAL
ALBUMIN SERPL-MCNC: 4 G/DL (ref 3.4–5)
ALP BLD-CCNC: 67 U/L (ref 40–129)
ALT SERPL-CCNC: 12 U/L (ref 10–40)
ANION GAP SERPL CALCULATED.3IONS-SCNC: 11 MMOL/L (ref 3–16)
ANTIBODY SCREEN: NORMAL
AST SERPL-CCNC: 30 U/L (ref 15–37)
BASOPHILS ABSOLUTE: 0.2 K/UL (ref 0–0.2)
BASOPHILS RELATIVE PERCENT: 1.5 %
BILIRUB SERPL-MCNC: 4.9 MG/DL (ref 0–1)
BLOOD BANK DISPENSE STATUS: NORMAL
BLOOD BANK PRODUCT CODE: NORMAL
BPU ID: NORMAL
BUN BLDV-MCNC: 7 MG/DL (ref 7–20)
CALCIUM SERPL-MCNC: 9 MG/DL (ref 8.3–10.6)
CHLORIDE BLD-SCNC: 105 MMOL/L (ref 99–110)
CO2: 21 MMOL/L (ref 21–32)
CREAT SERPL-MCNC: 0.6 MG/DL (ref 0.9–1.3)
DESCRIPTION BLOOD BANK: NORMAL
EOSINOPHILS ABSOLUTE: 0.4 K/UL (ref 0–0.6)
EOSINOPHILS RELATIVE PERCENT: 2.8 %
GFR SERPL CREATININE-BSD FRML MDRD: >60 ML/MIN/{1.73_M2}
GLUCOSE BLD-MCNC: 97 MG/DL (ref 70–99)
HCT VFR BLD CALC: 19.6 % (ref 40.5–52.5)
HEMOGLOBIN: 7 G/DL (ref 13.5–17.5)
LYMPHOCYTES ABSOLUTE: 2.7 K/UL (ref 1–5.1)
LYMPHOCYTES RELATIVE PERCENT: 21.4 %
MCH RBC QN AUTO: 35.4 PG (ref 26–34)
MCHC RBC AUTO-ENTMCNC: 35.7 G/DL (ref 31–36)
MCV RBC AUTO: 99 FL (ref 80–100)
MONOCYTES ABSOLUTE: 1.4 K/UL (ref 0–1.3)
MONOCYTES RELATIVE PERCENT: 11.1 %
NEUTROPHILS ABSOLUTE: 8 K/UL (ref 1.7–7.7)
NEUTROPHILS RELATIVE PERCENT: 63.2 %
PDW BLD-RTO: 21.7 % (ref 12.4–15.4)
PLATELET # BLD: 304 K/UL (ref 135–450)
PMV BLD AUTO: 8.7 FL (ref 5–10.5)
POTASSIUM REFLEX MAGNESIUM: 3.7 MMOL/L (ref 3.5–5.1)
RBC # BLD: 1.98 M/UL (ref 4.2–5.9)
SODIUM BLD-SCNC: 137 MMOL/L (ref 136–145)
TOTAL PROTEIN: 6.3 G/DL (ref 6.4–8.2)
WBC # BLD: 12.7 K/UL (ref 4–11)

## 2023-02-13 PROCEDURE — 99232 SBSQ HOSP IP/OBS MODERATE 35: CPT | Performed by: INTERNAL MEDICINE

## 2023-02-13 PROCEDURE — 85025 COMPLETE CBC W/AUTO DIFF WBC: CPT

## 2023-02-13 PROCEDURE — 6370000000 HC RX 637 (ALT 250 FOR IP): Performed by: INTERNAL MEDICINE

## 2023-02-13 PROCEDURE — 86901 BLOOD TYPING SEROLOGIC RH(D): CPT

## 2023-02-13 PROCEDURE — 2060000000 HC ICU INTERMEDIATE R&B

## 2023-02-13 PROCEDURE — 2580000003 HC RX 258: Performed by: HOSPITALIST

## 2023-02-13 PROCEDURE — 86850 RBC ANTIBODY SCREEN: CPT

## 2023-02-13 PROCEDURE — 36415 COLL VENOUS BLD VENIPUNCTURE: CPT

## 2023-02-13 PROCEDURE — 6370000000 HC RX 637 (ALT 250 FOR IP)

## 2023-02-13 PROCEDURE — 36430 TRANSFUSION BLD/BLD COMPNT: CPT

## 2023-02-13 PROCEDURE — P9016 RBC LEUKOCYTES REDUCED: HCPCS

## 2023-02-13 PROCEDURE — 6370000000 HC RX 637 (ALT 250 FOR IP): Performed by: HOSPITALIST

## 2023-02-13 PROCEDURE — 86900 BLOOD TYPING SEROLOGIC ABO: CPT

## 2023-02-13 PROCEDURE — 80053 COMPREHEN METABOLIC PANEL: CPT

## 2023-02-13 PROCEDURE — 6360000002 HC RX W HCPCS: Performed by: HOSPITALIST

## 2023-02-13 PROCEDURE — 86923 COMPATIBILITY TEST ELECTRIC: CPT

## 2023-02-13 RX ORDER — DIMETHICONE, OXYBENZONE, AND PADIMATE O 2; 2.5; 6.6 G/100G; G/100G; G/100G
STICK TOPICAL
Status: COMPLETED
Start: 2023-02-13 | End: 2023-02-13

## 2023-02-13 RX ORDER — PSEUDOEPHEDRINE HCL 30 MG
60 TABLET ORAL ONCE
Status: DISCONTINUED | OUTPATIENT
Start: 2023-02-13 | End: 2023-02-14 | Stop reason: HOSPADM

## 2023-02-13 RX ORDER — SODIUM CHLORIDE 9 MG/ML
INJECTION, SOLUTION INTRAVENOUS PRN
Status: DISCONTINUED | OUTPATIENT
Start: 2023-02-13 | End: 2023-02-14 | Stop reason: HOSPADM

## 2023-02-13 RX ADMIN — Medication 10 ML: at 10:15

## 2023-02-13 RX ADMIN — OXYCODONE HYDROCHLORIDE 10 MG: 5 TABLET ORAL at 02:32

## 2023-02-13 RX ADMIN — OXYCODONE HYDROCHLORIDE 10 MG: 5 TABLET ORAL at 10:15

## 2023-02-13 RX ADMIN — HYDROMORPHONE HYDROCHLORIDE 1.5 MG: 2 INJECTION, SOLUTION INTRAMUSCULAR; INTRAVENOUS; SUBCUTANEOUS at 11:42

## 2023-02-13 RX ADMIN — HYDROMORPHONE HYDROCHLORIDE 1.5 MG: 2 INJECTION, SOLUTION INTRAMUSCULAR; INTRAVENOUS; SUBCUTANEOUS at 21:14

## 2023-02-13 RX ADMIN — HYDROXYUREA 1000 MG: 500 CAPSULE ORAL at 21:14

## 2023-02-13 RX ADMIN — Medication: at 23:40

## 2023-02-13 RX ADMIN — OXYCODONE HYDROCHLORIDE 10 MG: 5 TABLET ORAL at 22:44

## 2023-02-13 RX ADMIN — HYDROMORPHONE HYDROCHLORIDE 1.5 MG: 2 INJECTION, SOLUTION INTRAMUSCULAR; INTRAVENOUS; SUBCUTANEOUS at 08:05

## 2023-02-13 RX ADMIN — Medication 10 ML: at 21:26

## 2023-02-13 RX ADMIN — HYDROMORPHONE HYDROCHLORIDE 1.5 MG: 2 INJECTION, SOLUTION INTRAMUSCULAR; INTRAVENOUS; SUBCUTANEOUS at 14:54

## 2023-02-13 RX ADMIN — HYDROMORPHONE HYDROCHLORIDE 1 MG: 1 INJECTION, SOLUTION INTRAMUSCULAR; INTRAVENOUS; SUBCUTANEOUS at 04:13

## 2023-02-13 RX ADMIN — SILDENAFIL CITRATE 20 MG: 20 TABLET ORAL at 10:15

## 2023-02-13 RX ADMIN — HYDROMORPHONE HYDROCHLORIDE 1.5 MG: 2 INJECTION, SOLUTION INTRAMUSCULAR; INTRAVENOUS; SUBCUTANEOUS at 18:02

## 2023-02-13 RX ADMIN — SODIUM CHLORIDE: 9 INJECTION, SOLUTION INTRAVENOUS at 23:31

## 2023-02-13 RX ADMIN — FOLIC ACID 2 MG: 1 TABLET ORAL at 10:15

## 2023-02-13 RX ADMIN — SODIUM CHLORIDE: 9 INJECTION, SOLUTION INTRAVENOUS at 14:57

## 2023-02-13 RX ADMIN — HYDROMORPHONE HYDROCHLORIDE 1.5 MG: 2 INJECTION, SOLUTION INTRAMUSCULAR; INTRAVENOUS; SUBCUTANEOUS at 00:46

## 2023-02-13 RX ADMIN — OXYCODONE HYDROCHLORIDE 10 MG: 5 TABLET ORAL at 16:51

## 2023-02-13 ASSESSMENT — PAIN DESCRIPTION - DESCRIPTORS
DESCRIPTORS: SHARP
DESCRIPTORS: ACHING;SHARP
DESCRIPTORS: SHARP
DESCRIPTORS: OTHER (COMMENT)
DESCRIPTORS: ACHING;SHARP
DESCRIPTORS: ACHING;SHARP

## 2023-02-13 ASSESSMENT — PAIN - FUNCTIONAL ASSESSMENT: PAIN_FUNCTIONAL_ASSESSMENT: ACTIVITIES ARE NOT PREVENTED

## 2023-02-13 ASSESSMENT — PAIN SCALES - GENERAL
PAINLEVEL_OUTOF10: 8
PAINLEVEL_OUTOF10: 7
PAINLEVEL_OUTOF10: 8
PAINLEVEL_OUTOF10: 6
PAINLEVEL_OUTOF10: 7
PAINLEVEL_OUTOF10: 8
PAINLEVEL_OUTOF10: 7
PAINLEVEL_OUTOF10: 7
PAINLEVEL_OUTOF10: 8
PAINLEVEL_OUTOF10: 10
PAINLEVEL_OUTOF10: 8
PAINLEVEL_OUTOF10: 7
PAINLEVEL_OUTOF10: 8
PAINLEVEL_OUTOF10: 10
PAINLEVEL_OUTOF10: 10
PAINLEVEL_OUTOF10: 7
PAINLEVEL_OUTOF10: 10

## 2023-02-13 ASSESSMENT — PAIN DESCRIPTION - ORIENTATION
ORIENTATION: RIGHT
ORIENTATION: LEFT
ORIENTATION: RIGHT
ORIENTATION: RIGHT

## 2023-02-13 ASSESSMENT — PAIN DESCRIPTION - LOCATION
LOCATION: KNEE
LOCATION: KNEE
LOCATION: PENIS
LOCATION: CHEST
LOCATION: KNEE
LOCATION: LEG

## 2023-02-13 ASSESSMENT — PAIN SCALES - WONG BAKER
WONGBAKER_NUMERICALRESPONSE: 2
WONGBAKER_NUMERICALRESPONSE: 2

## 2023-02-13 NOTE — FLOWSHEET NOTE
02/13/23 1410   Vital Signs   Temp 97.9 °F (36.6 °C)   Temp Source Oral   Heart Rate 97   Resp 18   /79   MAP (Calculated) 97   Patient Position Lying left side   Level of Consciousness 0   MEWS Score 1   Pain Assessment   Pain Assessment 0-10   Pain Level 8   Oxygen Therapy   SpO2 96 %   O2 Device None (Room air)

## 2023-02-13 NOTE — PROGRESS NOTES
ONCOLOGY HEMATOLOGY CARE PROGRESS NOTE      SUBJECTIVE:    Has still had knee pain over the weekend. He feels his priapism is recurring. ROS:     Constitutional:  No weight loss, No fever, No chills, No night sweats. Energy level good.   Eyes:  No impairment or change in vision  ENT / Mouth:  No pain, abnormal ulceration, bleeding, nasal drip or change in voice or hearing  Cardiovascular:  No chest pain, palpitations, new edema, or calf discomfort  Respiratory:  No pain, hemoptysis, change to breathing  Breast:  No pain, discharge, change in appearance or texture  Gastrointestinal:  No pain, cramping, jaundice, change to eating and bowel habits  Urinary:  No pain, bleeding or change in continence  Genitalia: No pain, bleeding or discharge  Musculoskeletal:  No redness, pain, edema or weakness  Skin:  No pruritus, rash, change to nodules or lesions  Neurologic:  No discomfort, change in mental status, speech, sensory or motor activity  Psychiatric:  No change in concentration or change to affect or mood  Endocrine:  No hot flashes, increased thirst, or change to urine production  Hematologic: No petechiae, ecchymosis or bleeding  Lymphatic:  No lymphadenopathy or lymphedema  Allergy / Immunologic:  No eczema, hives, frequent or recurrent infections    OBJECTIVE        Physical    VITALS:  Patient Vitals for the past 24 hrs:   BP Temp Temp src Pulse Resp SpO2 Weight   02/13/23 0805 -- -- -- -- 20 -- --   02/13/23 0413 -- -- -- -- 18 -- --   02/13/23 0232 119/68 98.4 °F (36.9 °C) Oral 86 18 95 % 188 lb 1.6 oz (85.3 kg)   02/13/23 0046 -- -- -- -- 18 -- --   02/12/23 2136 -- -- -- -- 18 -- --   02/12/23 2025 -- -- -- -- 18 -- --   02/12/23 1923 (!) 115/52 99.1 °F (37.3 °C) Oral 73 16 96 % --   02/12/23 1622 -- -- -- -- 16 -- --   02/12/23 1513 -- -- -- -- 18 -- --   02/12/23 1243 -- -- -- -- 18 -- --   02/12/23 1109 -- -- -- -- 18 -- --       24HR INTAKE/OUTPUT:    Intake/Output Summary (Last 24 hours) at 2/13/2023 0836  Last data filed at 2/12/2023 1406  Gross per 24 hour   Intake 30 ml   Output --   Net 30 ml       CONSTITUTIONAL: awake, alert, cooperative, no apparent distress   EYES: pupils equal, round and reactive to light, sclera clear and conjunctiva normal  ENT: Normocephalic, without obvious abnormality, atraumatic  NECK: supple, symmetrical, no jugular venous distension and no carotid bruits   HEMATOLOGIC/LYMPHATIC: no cervical, supraclavicular or axillary lymphadenopathy   LUNGS: no increased work of breathing and clear to auscultation   CARDIOVASCULAR: regular rate and rhythm, normal S1 and S2, no murmur noted  ABDOMEN: normal bowel sounds x 4, soft, non-distended, non-tender, no masses palpated, no hepatosplenomgaly   MUSCULOSKELETAL: full range of motion noted, tone is normal  NEUROLOGIC: awake, alert, oriented to name, place and time. Motor skills grossly intact. SKIN: Normal skin color, texture, turgor and no jaundice. appears intact   EXTREMITIES: no LE edema     DATA:  CBC:    Recent Labs     02/13/23 0448 02/12/23 0442 02/11/23 0423 02/09/23 2049 01/28/23  1011   WBC 12.7* 12.2* 12.4* 15.2* 14.0*   NEUTROABS 8.0*  --  7.5 13.4* 9.4*   LYMPHOPCT 21.4  --  26.0 9.0 17.0   RBC 1.98* 2.02* 2.15* 2.43* 2.25*   HGB 7.0* 7.2* 7.7* 8.8* 7.7*   HCT 19.6* 20.1* 21.4* 23.6* 22.1*   MCV 99.0 99.3 99.6 97.0 98.1   MCH 35.4* 35.6* 35.8* 36.0* 34.2*   MCHC 35.7 35.8 35.9 37.2* 34.9   RDW 21.7* 22.4* 23.9* 28.5* 24.5*    291 322 388 373       PT/INR:    Recent Labs     02/13/23 0448 02/11/23 0423 02/09/23  1853 01/28/23  1011 01/15/23  0839   PROT 6.3* 6.7 7.8 6.9 7.0     PTT:  No results for input(s): APTT in the last 720 hours.     CMP:    Recent Labs     02/13/23  0448 02/12/23  0442 02/11/23  0423 02/09/23  1853 01/28/23  1011 01/15/23  0839    138 135* 140 140 135*   K 3.7 4.2 4.1 5.0 3.9 4.0    107 105 106 106 101   CO2 21 21 20* 23 23 24   GLUCOSE 97 98 130* 111* 99 95   BUN 7 10 8 11 10 9   CREATININE 0.6* 0.6* <0.5* <0.5* 0.6* 0.8*   LABGLOM >60 >60 >60 >60 >60 >60   CALCIUM 9.0 9.4 9.3 9.4 9.4 9.2   PROT 6.3*  --  6.7 7.8 6.9 7.0   LABALBU 4.0  --  4.0 4.6 4.7 4.3   AGRATIO 1.7  --   --  1.4 2.1 1.6   BILITOT 4.9*  --  4.5* 5.3* 7.0* 5.9*   ALKPHOS 67  --  70 79 72 82   ALT 12  --  13 18 15 16   AST 30  --  29 67* 36 33       Lab Results   Component Value Date    CALCIUM 9.0 02/13/2023    PHOS 4.5 10/20/2021       LDH:No results for input(s): LDH in the last 720 hours. Radiology Review:  XR CHEST PORTABLE  Narrative: EXAMINATION:  ONE XRAY VIEW OF THE CHEST    2/10/2023 12:58 am    COMPARISON:  11/21/2022    HISTORY:  ORDERING SYSTEM PROVIDED HISTORY: susp pna  TECHNOLOGIST PROVIDED HISTORY:  Reason for exam:->susp pna  Reason for Exam: poss pne    FINDINGS:  A single frontal view of the chest was performed. There is no acute skeletal  abnormality. There is chronic sclerosis of the osseous structures, likely  secondary to patient history of sickle cell disease. The heart size is  mildly enlarged, though stable. The mediastinal contours are within normal  limits. The pulmonary vascularity is at the upper limits normal.  The lungs  are clear, without evidence of acute airspace consolidation, pneumothorax, or  pleural effusion. Impression: Stable chronic cardiomegaly, without acute airspace consolidation or CHF.       Problem List  Patient Active Problem List   Diagnosis    Sickle cell pain crisis ([address]    Asthma    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 ([address]    Sepsis ([address]    Opiate overdose (HCC)    Opiate antagonist poisoning, accidental or unintentional, initial encounter    Leukocytosis    Hypoxia    Anemia    Other chest pain    Pneumonia due to infectious organism    Fever    Chronic prescription opiate use    Right leg pain    Dental infection    Sickle cell crisis ([address]    History of DVT in adulthood    Overweight (BMI 25.0-29. 9)    Chest pain    Abnormal EKG    Acute chest syndrome due to sickle-cell disease (HCC)    Priapism    Intractable pain    Gastroesophageal reflux disease without esophagitis       ASSESSMENT AND PLAN:    Sickle cell crisis:  -He is noncompliant and has not been taking his Hydrea-He quit taking his Hydrea  -IV crizanlizumab was stopped for non-compliance and lack of efficacy  -He refuses to see a pain specialist   -He refuses a support group as supplied by his insurance company  -His narcotics have to be carefully controlled for concern of an overdose.  -admitted for pain crisis  -IV dilaudid and toradol  -IVF  -1 unit PRBC today with persistent pain        Priapism  - recurrent from sickle cell disease  - urology following  - improved with sudafed  - on daily sidenafil  - will order sudafed today        ONCOLOGIC DISPOSITION: once pain controlled      Colleen Chapin MD  Please contact through [facility]

## 2023-02-13 NOTE — FLOWSHEET NOTE
02/13/23 1651   Vital Signs   Temp 97.9 °F (36.6 °C)   Temp Source Oral   Heart Rate 88   Resp 18   /66   MAP (Calculated) 85   BP Location Left upper arm   Level of Consciousness 0   MEWS Score 1   Pain Assessment   Pain Level 7       Pt A&O x 4, Calm, cooperative. Blood transfusion in progress, batteries changed in telemetry. Call light in hand. Pt encouraged to call with any needs.

## 2023-02-13 NOTE — PROGRESS NOTES
PRN pain medication given for pain 8/10, pts white board updated in room with pain schedule. Nurse called cafeteria to place an order for cheesy mohr fries per patients request. No further needs by patient. Call light in hand.

## 2023-02-13 NOTE — PROGRESS NOTES
Progress Note    Admit Date:  2/9/2023    Admitted with sickle cell crisis and priapism. Seen by hematology as well as urology. Subjective:  MrVinnie Lopez Cousins complains of persistent left knee pain . He is a moderate distress . Hemoglobin slowly trending down  . Patient is being hydrated though. Continue pain medications-Dilaudid and oxycodone prn    - > Patient is doing about the same, 1 unit of PRBC being transfused for hemoglobin of 7.0. Hematologist ordered transfusion as patient is still complaining of pain. .  White count of 12.7 . Continued on Dilaudid and Oxy as needed for pain. Sudafed as needed for priapism. Objective:   BP (!) 131/91   Pulse 75   Temp 98.4 °F (36.9 °C) (Oral)   Resp 18   Ht 5' 9\" (1.753 m)   Wt 188 lb 1.6 oz (85.3 kg)   SpO2 96%   BMI 27.78 kg/m²     Intake/Output Summary (Last 24 hours) at 2/13/2023 1248  Last data filed at 2/12/2023 1406  Gross per 24 hour   Intake 30 ml   Output --   Net 30 ml           Physical Exam:  General:  Awake, alert, NAD  Skin:  Warm and dry  Neck:  JVD absent. Neck supple  Chest:  Clear to auscultation, respiration easy. No wheezes, rales or rhonchi. Cardiovascular:  RRR ,S1S2 normal  Abdomen:  Soft, non tender, non distended, BS +  Extremities:  No edema. Left knee tender  Intact peripheral pulses.  Brisk cap refill, < 2 secs  Neuro: non focal      Medications:   Scheduled Meds:   pseudoephedrine  60 mg Oral Once    sildenafil  20 mg Oral Daily    hydroxyurea  1,000 mg Oral BID    folic acid  2 mg Oral Daily    pantoprazole  40 mg Oral QAM AC    sodium chloride flush  5-40 mL IntraVENous 2 times per day    enoxaparin  40 mg SubCUTAneous Daily       Continuous Infusions:   sodium chloride      sodium chloride      sodium chloride 75 mL/hr at 02/12/23 2030       Data:  CBC:   Recent Labs     02/11/23  0423 02/12/23  0442 02/13/23 0448   WBC 12.4* 12.2* 12.7*   RBC 2.15* 2.02* 1.98*   HGB 7.7* 7.2* 7.0*   HCT 21.4* 20.1* 19.6* MCV 99.6 99.3 99.0   RDW 23.9* 22.4* 21.7*    291 304       BMP:   Recent Labs     02/11/23 0423 02/12/23 0442 02/13/23 0448   * 138 137   K 4.1 4.2 3.7    107 105   CO2 20* 21 21   BUN 8 10 7   CREATININE <0.5* 0.6* 0.6*       BNP: No results for input(s): BNP in the last 72 hours. PT/INR: No results for input(s): PROTIME, INR in the last 72 hours. APTT: No results for input(s): APTT in the last 72 hours. CARDIAC ENZYMES:   Recent Labs     02/10/23  1741   TROPONINI <0.01       FASTING LIPID PANEL:No results found for: CHOL, HDL, TRIG  LIVER PROFILE:   Recent Labs     02/11/23 0423 02/13/23 0448   AST 29 30   ALT 13 12   BILIDIR 0.4*  --    BILITOT 4.5* 4.9*   ALKPHOS 70 67            Cultures  COVID/Influenza: not detected       Radiology  XR CHEST PORTABLE   Final Result   Stable chronic cardiomegaly, without acute airspace consolidation or CHF. XR KNEE LEFT (1-2 VIEWS)   Final Result   No acute abnormality of the knee. 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 *      Plan:    #Sickle cell crisis  #Sickle cell anemia  -presented with c/o left knee pain  -imaging non-acute  -longstanding issue of med non-compliance   -Heme-onc consulted  -Hemoglobin-> 8.8 -> 7.7 --> 7.2-> 7.0 . Transfuse 1 unit of PRBC today .  -Hematology recommendations - monitor H&H and transfuse if H/H dropping and symptoms persist.   -IVF and pain control w/ dilaudid and toradol  - Supposed to be on chronic Hydrea for sickle cell anemia. Concern about compliance. Clarified dose with patient -he says he takes Hydrea 1000 mg twice daily-I have resumed this.  -Patient takes Oxy at home.   We will try to switch him to his oral pain medications    #Priapism  -recurrent ischemic priapism 2/2 sickle cell  -non-compliant w/ prescribed sildenafil  -urology consulted   -has had success in past with sudafed, alkalinization, ice packs and supplemental O2  -sudafed x1 ordered with resolution  -will monitor   This is resolved ; seen by urology. recommendation is to continue sildenafil 20 mg daily, and Sudafed as needed and follow-up as needed     #Chest pain  -reports acute onset CP 2 days ago, spontaneously resolved  -no associated SOB  -EKG NSR  -trop neg      #LFT elevation, chronic  -bilirubin 5.3; AST 67  -likely 2/2 sickle cell  -patient denies RUQ pain  -Monitor-improving     #Asthma  -without acute exacerbation   -continue home inhalers     #GERD  -no longer taking PPI      #Vitamin D deficiency         DVT Prophylaxis: Lovenox  Diet: ADULT DIET; Regular  Code Status: Full Code     Pt  has a management plan in place ; he will not get narcotics on discharge. I have explained this to him. Plan of care discussed with patient. Transfuse 1 unit of PRBC today . Discharge plan for later today or tomorrow morning. Continue current management.  Rachel Funk MD   2/13/2023 12:48 PM

## 2023-02-13 NOTE — PROGRESS NOTES
Pt sitting in bed watching his phone. He c/o pain in R knee, PRN oxycodone given per MAR. Assessment complete-see flowsheet. Medications given-see MAR. Pt denies further needs, call light and bedside table within reach. Will continue to monitor.

## 2023-02-13 NOTE — PROGRESS NOTES
Pt sitting up in bed this morning c/o pain 10/10, pt crying uncontrollably with head buried into lap. Shift assessment complete and all meds given per MAR. Pt refused several oral meds. Pt became visibly upset by taking off telemetry and slamming water onto table. Pt asking for pain medication to be increased. Nurse informed patient she would speak with the hospitalist this morning during rounds. Bed in lowest position, call light in hand. Pt denied any further needs.

## 2023-02-14 VITALS
DIASTOLIC BLOOD PRESSURE: 58 MMHG | TEMPERATURE: 98 F | SYSTOLIC BLOOD PRESSURE: 110 MMHG | HEART RATE: 85 BPM | RESPIRATION RATE: 16 BRPM | OXYGEN SATURATION: 96 % | WEIGHT: 187.4 LBS | HEIGHT: 69 IN | BODY MASS INDEX: 27.76 KG/M2

## 2023-02-14 PROBLEM — Z91.199 NON-COMPLIANCE: Status: ACTIVE | Noted: 2023-02-14

## 2023-02-14 PROBLEM — D57.1 SICKLE CELL ANEMIA (HCC): Status: ACTIVE | Noted: 2023-02-14

## 2023-02-14 LAB
BASOPHILS ABSOLUTE: 0.1 K/UL (ref 0–0.2)
BASOPHILS RELATIVE PERCENT: 0.9 %
EOSINOPHILS ABSOLUTE: 0.3 K/UL (ref 0–0.6)
EOSINOPHILS RELATIVE PERCENT: 2 %
HCT VFR BLD CALC: 23.3 % (ref 40.5–52.5)
HCT VFR BLD CALC: 25.8 % (ref 40.5–52.5)
HEMOGLOBIN: 8.3 G/DL (ref 13.5–17.5)
HEMOGLOBIN: 9.5 G/DL (ref 13.5–17.5)
LYMPHOCYTES ABSOLUTE: 3.9 K/UL (ref 1–5.1)
LYMPHOCYTES RELATIVE PERCENT: 26 %
MCH RBC QN AUTO: 33.7 PG (ref 26–34)
MCHC RBC AUTO-ENTMCNC: 35.5 G/DL (ref 31–36)
MCV RBC AUTO: 94.8 FL (ref 80–100)
MONOCYTES ABSOLUTE: 1.5 K/UL (ref 0–1.3)
MONOCYTES RELATIVE PERCENT: 9.9 %
NEUTROPHILS ABSOLUTE: 9.2 K/UL (ref 1.7–7.7)
NEUTROPHILS RELATIVE PERCENT: 61.2 %
PDW BLD-RTO: 22.9 % (ref 12.4–15.4)
PLATELET # BLD: 384 K/UL (ref 135–450)
PMV BLD AUTO: 8.2 FL (ref 5–10.5)
RBC # BLD: 2.46 M/UL (ref 4.2–5.9)
WBC # BLD: 15 K/UL (ref 4–11)

## 2023-02-14 PROCEDURE — 6370000000 HC RX 637 (ALT 250 FOR IP): Performed by: HOSPITALIST

## 2023-02-14 PROCEDURE — 85025 COMPLETE CBC W/AUTO DIFF WBC: CPT

## 2023-02-14 PROCEDURE — 6360000002 HC RX W HCPCS: Performed by: HOSPITALIST

## 2023-02-14 PROCEDURE — 85014 HEMATOCRIT: CPT

## 2023-02-14 PROCEDURE — 36415 COLL VENOUS BLD VENIPUNCTURE: CPT

## 2023-02-14 PROCEDURE — 6370000000 HC RX 637 (ALT 250 FOR IP): Performed by: INTERNAL MEDICINE

## 2023-02-14 PROCEDURE — 85018 HEMOGLOBIN: CPT

## 2023-02-14 PROCEDURE — 99238 HOSP IP/OBS DSCHRG MGMT 30/<: CPT | Performed by: INTERNAL MEDICINE

## 2023-02-14 RX ORDER — PSEUDOEPHEDRINE HYDROCHLORIDE 60 MG/1
60 TABLET, FILM COATED ORAL DAILY PRN
COMMUNITY
Start: 2023-02-14

## 2023-02-14 RX ORDER — HYDROXYUREA 500 MG/1
1000 CAPSULE ORAL 2 TIMES DAILY
COMMUNITY
Start: 2023-02-14

## 2023-02-14 RX ADMIN — SILDENAFIL CITRATE 20 MG: 20 TABLET ORAL at 09:29

## 2023-02-14 RX ADMIN — HYDROMORPHONE HYDROCHLORIDE 1 MG: 1 INJECTION, SOLUTION INTRAMUSCULAR; INTRAVENOUS; SUBCUTANEOUS at 09:23

## 2023-02-14 RX ADMIN — FOLIC ACID 2 MG: 1 TABLET ORAL at 09:27

## 2023-02-14 RX ADMIN — PANTOPRAZOLE SODIUM 40 MG: 40 TABLET, DELAYED RELEASE ORAL at 06:23

## 2023-02-14 RX ADMIN — HYDROXYUREA 1000 MG: 500 CAPSULE ORAL at 09:27

## 2023-02-14 RX ADMIN — OXYCODONE HYDROCHLORIDE 10 MG: 5 TABLET ORAL at 05:15

## 2023-02-14 RX ADMIN — OXYCODONE HYDROCHLORIDE 10 MG: 5 TABLET ORAL at 13:27

## 2023-02-14 RX ADMIN — HYDROMORPHONE HYDROCHLORIDE 1.5 MG: 2 INJECTION, SOLUTION INTRAMUSCULAR; INTRAVENOUS; SUBCUTANEOUS at 00:29

## 2023-02-14 RX ADMIN — HYDROMORPHONE HYDROCHLORIDE 1.5 MG: 2 INJECTION, SOLUTION INTRAMUSCULAR; INTRAVENOUS; SUBCUTANEOUS at 06:24

## 2023-02-14 RX ADMIN — HYDROMORPHONE HYDROCHLORIDE 1.5 MG: 2 INJECTION, SOLUTION INTRAMUSCULAR; INTRAVENOUS; SUBCUTANEOUS at 03:25

## 2023-02-14 ASSESSMENT — PAIN SCALES - GENERAL
PAINLEVEL_OUTOF10: 7

## 2023-02-14 ASSESSMENT — PAIN DESCRIPTION - LOCATION
LOCATION: KNEE
LOCATION: CHEST
LOCATION: KNEE

## 2023-02-14 ASSESSMENT — PAIN SCALES - WONG BAKER
WONGBAKER_NUMERICALRESPONSE: 4

## 2023-02-14 ASSESSMENT — PAIN DESCRIPTION - DESCRIPTORS
DESCRIPTORS: ACHING
DESCRIPTORS: ACHING;SHARP
DESCRIPTORS: SHARP;ACHING
DESCRIPTORS: SHARP
DESCRIPTORS: SHARP;ACHING

## 2023-02-14 ASSESSMENT — PAIN - FUNCTIONAL ASSESSMENT
PAIN_FUNCTIONAL_ASSESSMENT: ACTIVITIES ARE NOT PREVENTED

## 2023-02-14 ASSESSMENT — PAIN DESCRIPTION - ORIENTATION
ORIENTATION: LEFT
ORIENTATION: LEFT

## 2023-02-14 NOTE — PLAN OF CARE
Problem: ABCDS Injury Assessment  Goal: Absence of physical injury  2/14/2023 1336 by Laura Hall RN  Outcome: Completed  2/14/2023 0804 by Laura Hall RN  Outcome: Progressing  2/14/2023 0351 by Armando Mendiola RN  Outcome: Progressing  Flowsheets (Taken 2/14/2023 0351)  Absence of Physical Injury: Implement safety measures based on patient assessment     Problem: Discharge Planning  Goal: Discharge to home or other facility with appropriate resources  2/14/2023 1336 by Laura Hall RN  Outcome: Completed  Flowsheets (Taken 2/14/2023 0805)  Discharge to home or other facility with appropriate resources: Identify barriers to discharge with patient and caregiver  2/14/2023 0804 by Laura Hall RN  Outcome: Progressing  2/14/2023 0351 by Armando Mendiola RN  Outcome: Progressing  Flowsheets (Taken 2/14/2023 8295)  Discharge to home or other facility with appropriate resources: Identify barriers to discharge with patient and caregiver     Problem: Pain  Goal: Verbalizes/displays adequate comfort level or baseline comfort level  2/14/2023 1336 by Laura Hall RN  Outcome: Completed  Flowsheets (Taken 2/14/2023 0805)  Verbalizes/displays adequate comfort level or baseline comfort level: Encourage patient to monitor pain and request assistance  2/14/2023 0804 by Laura Hall RN  Outcome: Progressing  2/14/2023 0351 by Armando Mendiola RN  Outcome: Not Progressing  Flowsheets (Taken 2/14/2023 0351)  Verbalizes/displays adequate comfort level or baseline comfort level:   Encourage patient to monitor pain and request assistance   Assess pain using appropriate pain scale   Administer analgesics based on type and severity of pain and evaluate response     Problem: Pain  Goal: Verbalizes/displays adequate comfort level or baseline comfort level  2/14/2023 1336 by Laura Hall RN  Outcome: Completed  Flowsheets (Taken 2/14/2023 0805)  Verbalizes/displays adequate comfort level or baseline comfort level: Encourage patient to monitor pain and request assistance  2/14/2023 0804 by Conrado Teresa RN  Outcome: Progressing  2/14/2023 0351 by Yana Preciado RN  Outcome: Not Progressing  Flowsheets (Taken 2/14/2023 0351)  Verbalizes/displays adequate comfort level or baseline comfort level:   Encourage patient to monitor pain and request assistance   Assess pain using appropriate pain scale   Administer analgesics based on type and severity of pain and evaluate response

## 2023-02-14 NOTE — PROGRESS NOTES
Patient educated on discharge instructions as well as new medications use, dosage, administration and possible side effects. Patient verified knowledge. IV removed without difficulty and dry dressing in place. Telemetry monitor removed and returned to [facility]. Pt leaving  facility in stable condition to Home with all of their personal belongings.

## 2023-02-14 NOTE — PROGRESS NOTES
ONCOLOGY HEMATOLOGY CARE PROGRESS NOTE      SUBJECTIVE:    Pain improved. Priapism resolved. Transfused yesterday. Feels like he can go home. ROS:     Constitutional:  No weight loss, No fever, No chills, No night sweats. Energy level good.   Eyes:  No impairment or change in vision  ENT / Mouth:  No pain, abnormal ulceration, bleeding, nasal drip or change in voice or hearing  Cardiovascular:  No chest pain, palpitations, new edema, or calf discomfort  Respiratory:  No pain, hemoptysis, change to breathing  Breast:  No pain, discharge, change in appearance or texture  Gastrointestinal:  No pain, cramping, jaundice, change to eating and bowel habits  Urinary:  No pain, bleeding or change in continence  Genitalia: No pain, bleeding or discharge  Musculoskeletal:  No redness, pain, edema or weakness  Skin:  No pruritus, rash, change to nodules or lesions  Neurologic:  No discomfort, change in mental status, speech, sensory or motor activity  Psychiatric:  No change in concentration or change to affect or mood  Endocrine:  No hot flashes, increased thirst, or change to urine production  Hematologic: No petechiae, ecchymosis or bleeding  Lymphatic:  No lymphadenopathy or lymphedema  Allergy / Immunologic:  No eczema, hives, frequent or recurrent infections    OBJECTIVE        Physical    VITALS:  Patient Vitals for the past 24 hrs:   BP Temp Temp src Pulse Resp SpO2 Weight   02/14/23 0805 125/75 98 °F (36.7 °C) Oral 85 16 -- --   02/14/23 0545 -- -- -- -- 18 -- --   02/14/23 0515 -- -- -- -- 18 -- --   02/14/23 0355 -- -- -- -- 16 -- --   02/14/23 0315 125/63 99 °F (37.2 °C) Oral 76 16 96 % 187 lb 6.4 oz (85 kg)   02/14/23 0059 -- -- -- -- 18 -- --   02/14/23 0029 -- -- -- -- 18 -- --   02/13/23 2314 -- -- -- -- 18 -- --   02/13/23 2244 -- -- -- -- 18 -- --   02/13/23 2144 -- -- -- -- 18 -- --   02/13/23 2114 -- -- -- -- 18 -- --   02/13/23 2100 125/69 99 °F (37.2 °C) Oral 81 18 96 % --   02/13/23 2028 117/68 98.2 °F (36.8 °C) Oral 78 18 96 % --   02/13/23 1706 116/62 98.6 °F (37 °C) Oral 89 18 96 % --   02/13/23 1659 -- -- -- -- -- 97 % --   02/13/23 1651 122/66 97.9 °F (36.6 °C) Oral 88 18 -- --   02/13/23 1524 -- -- -- -- 16 -- --   02/13/23 1454 -- -- -- -- 16 -- --   02/13/23 1410 133/79 97.9 °F (36.6 °C) Oral 97 18 96 % --   02/13/23 1212 -- -- -- -- 18 -- --   02/13/23 1010 (!) 131/91 -- Axillary 75 18 96 % --         24HR INTAKE/OUTPUT:    Intake/Output Summary (Last 24 hours) at 2/14/2023 0816  Last data filed at 2/14/2023 7983  Gross per 24 hour   Intake 1950.83 ml   Output --   Net 1950.83 ml         CONSTITUTIONAL: awake, alert, cooperative, no apparent distress   EYES: pupils equal, round and reactive to light, sclera clear and conjunctiva normal  ENT: Normocephalic, without obvious abnormality, atraumatic  NECK: supple, symmetrical, no jugular venous distension and no carotid bruits   HEMATOLOGIC/LYMPHATIC: no cervical, supraclavicular or axillary lymphadenopathy   LUNGS: no increased work of breathing and clear to auscultation   CARDIOVASCULAR: regular rate and rhythm, normal S1 and S2, no murmur noted  ABDOMEN: normal bowel sounds x 4, soft, non-distended, non-tender, no masses palpated, no hepatosplenomgaly   MUSCULOSKELETAL: full range of motion noted, tone is normal  NEUROLOGIC: awake, alert, oriented to name, place and time. Motor skills grossly intact. SKIN: Normal skin color, texture, turgor and no jaundice.  appears intact   EXTREMITIES: no LE edema     DATA:  CBC:    Recent Labs     02/14/23  0738 02/14/23  0034 02/13/23  0448 02/12/23  0442 02/11/23  0423 02/09/23 2049   WBC 15.0*  --  12.7* 12.2* 12.4* 15.2*   NEUTROABS 9.2*  --  8.0*  --  7.5 13.4*   LYMPHOPCT 26.0  --  21.4  --  26.0 9.0   RBC 2.46*  --  1.98* 2.02* 2.15* 2.43*   HGB 8.3* 9.5* 7.0* 7.2* 7.7* 8.8*   HCT 23.3* 25.8* 19.6* 20.1* 21.4* 23.6*   MCV 94.8  --  99.0 99.3 99.6 97.0   MCH 33.7  --  35.4* 35.6* 35.8* 36.0*   MCHC 35.5  --  35.7 35.8 35.9 37.2*   RDW 22.9*  --  21.7* 22.4* 23.9* 28.5*     --  304 291 322 388         PT/INR:    Recent Labs     02/13/23 0448 02/11/23 0423 02/09/23 1853 01/28/23  1011 01/15/23  0839   PROT 6.3* 6.7 7.8 6.9 7.0       PTT:  No results for input(s): APTT in the last 720 hours. CMP:    Recent Labs     02/13/23 0448 02/12/23 0442 02/11/23 0423 02/09/23 1853 01/28/23  1011 01/15/23  0839    138 135* 140 140 135*   K 3.7 4.2 4.1 5.0 3.9 4.0    107 105 106 106 101   CO2 21 21 20* 23 23 24   GLUCOSE 97 98 130* 111* 99 95   BUN 7 10 8 11 10 9   CREATININE 0.6* 0.6* <0.5* <0.5* 0.6* 0.8*   LABGLOM >60 >60 >60 >60 >60 >60   CALCIUM 9.0 9.4 9.3 9.4 9.4 9.2   PROT 6.3*  --  6.7 7.8 6.9 7.0   LABALBU 4.0  --  4.0 4.6 4.7 4.3   AGRATIO 1.7  --   --  1.4 2.1 1.6   BILITOT 4.9*  --  4.5* 5.3* 7.0* 5.9*   ALKPHOS 67  --  70 79 72 82   ALT 12  --  13 18 15 16   AST 30  --  29 67* 36 33         Lab Results   Component Value Date    CALCIUM 9.0 02/13/2023    PHOS 4.5 10/20/2021       LDH:No results for input(s): LDH in the last 720 hours. Radiology Review:  XR CHEST PORTABLE  Narrative: EXAMINATION:  ONE XRAY VIEW OF THE CHEST    2/10/2023 12:58 am    COMPARISON:  11/21/2022    HISTORY:  ORDERING SYSTEM PROVIDED HISTORY: susp pna  TECHNOLOGIST PROVIDED HISTORY:  Reason for exam:->susp pna  Reason for Exam: poss pne    FINDINGS:  A single frontal view of the chest was performed. There is no acute skeletal  abnormality. There is chronic sclerosis of the osseous structures, likely  secondary to patient history of sickle cell disease. The heart size is  mildly enlarged, though stable. The mediastinal contours are within normal  limits. The pulmonary vascularity is at the upper limits normal.  The lungs  are clear, without evidence of acute airspace consolidation, pneumothorax, or  pleural effusion.   Impression: Stable chronic cardiomegaly, without acute airspace consolidation or CHF. Problem List  Patient Active Problem List   Diagnosis    Sickle cell pain crisis ([facility] [address]    Asthma    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 ([facility] [address]    Sepsis ([facility] [address]    Opiate overdose ([facility] [address]    Opiate antagonist poisoning, accidental or unintentional, initial encounter    Leukocytosis    Hypoxia    Anemia    Other chest pain    Pneumonia due to infectious organism    Fever    Chronic prescription opiate use    Right leg pain    Dental infection    Sickle cell crisis ([facility] [address]    History of DVT in adulthood    Overweight (BMI 25.0-29. 9)    Chest pain    Abnormal EKG    Acute chest syndrome due to sickle-cell disease (HCC)    Priapism    Intractable pain    Gastroesophageal reflux disease without esophagitis    Non-compliance    Sickle cell anemia (HCC)       ASSESSMENT AND PLAN:    Sickle cell crisis:  -He is noncompliant and has not been taking his Hydrea-He quit taking his Hydrea  -IV crizanlizumab was stopped for non-compliance and lack of efficacy  -He refuses to see a pain specialist   -He refuses a support group as supplied by his insurance company  -His narcotics have to be carefully controlled for concern of an overdose.  -admitted for pain crisis  -IV dilaudid and toradol  -IVF  -1 unit PRBC given 2/13/23        Priapism  - recurrent from sickle cell disease  - urology following  - improved with sudafed  - on daily sidenafil        ONCOLOGIC DISPOSITION: once pain controlled. OK for d/c from oncology standpoint. Have messaged his primary oncologist as he states he is due for refills on narcotics.        Karmen Young MD  Please contact through [facility]

## 2023-02-14 NOTE — FLOWSHEET NOTE
02/14/23 0315   Vital Signs   Temp 99 °F (37.2 °C)   Temp Source Oral   Heart Rate 76   Heart Rate Source Monitor   Resp 16   /63   MAP (Calculated) 84   BP Location Left upper arm   BP Method Automatic   Patient Position Sitting   Level of Consciousness 0   MEWS Score 1   Oxygen Therapy   SpO2 96 %   O2 Device None (Room air)   Height and Weight   Weight 187 lb 6.4 oz (85 kg)   Weight Method Actual;Standing scale   BMI (Calculated) 27.7       Patient is awake and still complaining of pain in his left knee. Call light within reach.

## 2023-02-14 NOTE — PLAN OF CARE
Problem: ABCDS Injury Assessment  Goal: Absence of physical injury  2/14/2023 0804 by Gigi Linares RN  Outcome: Progressing  2/14/2023 0351 by Milagros Wade RN  Outcome: Progressing  Flowsheets (Taken 2/14/2023 0351)  Absence of Physical Injury: Implement safety measures based on patient assessment     Problem: Discharge Planning  Goal: Discharge to home or other facility with appropriate resources  2/14/2023 0804 by Gigi Linares RN  Outcome: Progressing  2/14/2023 0351 by Milagros Wade RN  Outcome: Progressing  Flowsheets (Taken 2/14/2023 1203)  Discharge to home or other facility with appropriate resources: Identify barriers to discharge with patient and caregiver     Problem: Pain  Goal: Verbalizes/displays adequate comfort level or baseline comfort level  2/14/2023 0804 by Gigi Linares RN  Outcome: Progressing  2/14/2023 0351 by Milagros Wade RN  Outcome: Not Progressing  Flowsheets (Taken 2/14/2023 0351)  Verbalizes/displays adequate comfort level or baseline comfort level:   Encourage patient to monitor pain and request assistance   Assess pain using appropriate pain scale   Administer analgesics based on type and severity of pain and evaluate response     Problem: Pain  Goal: Verbalizes/displays adequate comfort level or baseline comfort level  2/14/2023 0804 by Gigi Linares RN  Outcome: Progressing  2/14/2023 0351 by Milagros Wade RN  Outcome: Not Progressing  Flowsheets (Taken 2/14/2023 0351)  Verbalizes/displays adequate comfort level or baseline comfort level:   Encourage patient to monitor pain and request assistance   Assess pain using appropriate pain scale   Administer analgesics based on type and severity of pain and evaluate response

## 2023-02-14 NOTE — FLOWSHEET NOTE
02/14/23 0805   Vital Signs   Temp 98 °F (36.7 °C)   Temp Source Oral   Heart Rate 85   Heart Rate Source Monitor   Resp 16   /75   MAP (Calculated) 92   BP Location Left Arm   Level of Consciousness 0   MEWS Score 1   Pain Assessment   Pain Assessment 0-10   Pain Level 7   Pain Location Knee   Pain Descriptors Aching   Functional Pain Assessment Activities are not prevented   Care Plan - Pain Goals   Verbalizes/displays adequate comfort level or baseline comfort level Encourage patient to monitor pain and request assistance   Pt sittng up in bed caox3, pt slow to speak, follows commands , speech clear , resp even, ,drowsiness noted ,call light within reach and bed in low position for pt safety see flowsheet for full assessment

## 2023-02-14 NOTE — DISCHARGE SUMMARY
Name:  Lashanda Blankenship  Room:  /9252-53  MRN:    [id number]    Discharge Summary      This discharge summary is in conjunction with a complete physical exam done on the day of discharge. Discharging Provider: Dr. Brennon Escudero MD      Admit: 2/9/2023  Discharge: 02/14/23      HPI taken from admission H&P:    The patient is a 20 y.o. male with asthma, cardiomegaly, hx DVT and sickle cell disease who presented to [facility] ED with complaint of left knee pain. Patient states he woke from sleep with acute onset left knee pain. He denies and inciting trauma. Reports this pain is similar in nature to prior sickle cell crises. States since being in the ED, he also developed a painful erection, which is also a frequent manifestation of his sickle cell disease. When asked if experiencing chest pain or SOB, patient states he did have an episode of chest pain 2 days ago that resolved spontaneously. He has not had chest pain since. Otherwise, he denies fever, chills, n/v/d, or abdominal pain. States he has not been taking any of his home medications. Diagnoses this Admission and Hospital Course;    #Sickle cell crisis  #Sickle cell anemia  -presented with c/o left knee pain  -imaging non-acute  -longstanding issue of med non-compliance   -Heme-onc consulted  -Hemoglobin-> 8.8 -> 7.7 --> 7.2-> 7.0 . Transfused 1 unit of PRBC on 2/13 . Hemoglobin up to 9.5 today  -Hematology recommendations - monitor H&H and transfuse if H/H dropping and symptoms persist.   -IVF and pain control w/ dilaudid and toradol  - Supposed to be on chronic Hydrea for sickle cell anemia. Concern about compliance. Clarified dose with patient -he says he takes Hydrea 1000 mg twice daily-I have resumed this.  -Patient takes Oxy at home. switched him to his oral pain medications  - Stable today.   Knee pain controlled  He can be discharged home; he gets all his refills from his primary oncologist.       #Priapism  -recurrent ischemic priapism 2/2 sickle cell  -non-compliant w/ prescribed sildenafil  -urology consulted   -has had success in past with sudafed, alkalinization, ice packs and supplemental O2  -sudafed x1 ordered with resolution  -will monitor   This is resolved ; seen by urology. recommendation is to continue sildenafil 20 mg daily, and Sudafed as needed . follow-up with urology     #Chest pain  -reports acute onset CP 2 days ago, spontaneously resolved  -no associated SOB  -EKG NSR  -trop neg      #LFT elevation, chronic  -bilirubin 5.3; AST 67  -likely 2/2 sickle cell  -patient denies RUQ pain  -Monitored-improving     #Asthma  -without acute exacerbation   -continue home inhalers     #GERD  -no longer taking PPI      #Vitamin D deficiency     Pt  has a management plan in place ; he will not get narcotics on discharge. I have explained this to him. Discharge today    Procedures (Please Review Full Report for Details)  N/A    Consults    IP CONSULT TO HEM/ONC  IP CONSULT TO UROLOGY      Physical Exam at Discharge:  BP (!) 110/58   Pulse 85   Temp 98 °F (36.7 °C) (Oral)   Resp 16   Ht 5' 9\" (1.753 m)   Wt 187 lb 6.4 oz (85 kg)   SpO2 96%   BMI 27.67 kg/m²   General:  Awake, alert, NAD  Skin:  Warm and dry  Neck:  JVD absent. Neck supple  Chest:  Clear to auscultation, respiration easy. No wheezes, rales or rhonchi. Cardiovascular:  RRR ,S1S2 normal  Abdomen:  Soft, non tender, non distended, BS +  Extremities:  No edema. Left knee mildly tender  Intact peripheral pulses.  Brisk cap refill, < 2 secs  Neuro: non focal      Data:  CBC:          Recent Labs     02/12/23  0442 02/13/23  0448 02/14/23  0034 02/14/23  0738   WBC 12.2* 12.7*  --  15.0*   RBC 2.02* 1.98*  --  2.46*   HGB 7.2* 7.0* 9.5* 8.3*   HCT 20.1* 19.6* 25.8* 23.3*   MCV 99.3 99.0  --  94.8   RDW 22.4* 21.7*  --  22.9*    304  --  384         BMP:        Recent Labs     02/12/23  0442 02/13/23  0448    137   K 4.2 3.7    105   CO2 21 21   BUN 10 7   CREATININE 0.6* 0.6*        LIVER PROFILE:       Recent Labs     02/13/23  0448   AST 30   ALT 12   BILITOT 4.9*   ALKPHOS 67        Cultures  COVID/Influenza: not detected     RADIOLOGY  XR CHEST PORTABLE   Final Result   Stable chronic cardiomegaly, without acute airspace consolidation or CHF. XR KNEE LEFT (1-2 VIEWS)   Final Result   No acute abnormality of the knee. Discharge Medications     Medication List        START taking these medications      hydroxyurea 500 MG chemo capsule  Commonly known as: HYDREA     pseudoephedrine 60 MG tablet  Commonly known as: SUDAFED            CHANGE how you take these medications      sildenafil 20 MG tablet  Commonly known as: REVATIO  Take 1 tablet by mouth daily  What changed: Another medication with the same name was removed. Continue taking this medication, and follow the directions you see here. CONTINUE taking these medications      albuterol sulfate  (90 Base) MCG/ACT inhaler  Commonly known as: PROVENTIL;VENTOLIN;PROAIR     folic acid 1 MG tablet  Commonly known as: FOLVITE  Take 2 tablets by mouth daily     ibuprofen 800 MG tablet  Commonly known as: ADVIL;MOTRIN     levalbuterol 0.31 MG/3ML nebulization  Commonly known as: XOPENEX     oxyCODONE HCl 10 MG immediate release tablet  Commonly known as: OXY-IR            STOP taking these medications      apixaban 5 MG Tabs tablet  Commonly known as: ELIQUIS     docusate 100 MG Caps  Commonly known as: COLACE, DULCOLAX     DULoxetine 30 MG extended release capsule  Commonly known as: CYMBALTA     pantoprazole 40 MG tablet  Commonly known as: PROTONIX                Discharged in stable condition to home    Follow Up:   Follow up with PCP in 1 week; follow-up with primary oncologist Dr. Jaylon Hermosillo; follow-up with urology       Dorinda Cummings MD

## 2023-02-14 NOTE — CARE COORDINATION
DISCHARGE ORDER  Date/Time 2023 2:20 PM  Completed by: BC Mckay  Case Management Department  Phone: [phone] Fax: [phone]   Case Management    Patient Name: Kendra Hill      : 2000  Admitting Diagnosis: Sickle cell crisis ([facility] [address] [D57.00]  Intractable pain [R52]      Admit order Date and Status:2023  (verify MD's last order for status of admission)      Noted discharge order. If applicable PT/OT recommendation at Discharge: na/  DME recommendation by PT/OT:n/a    Confirmed discharge plan: Yes  with whom pt  If pt confirmed DC plan does family need to be contacted by CM Yes if yes who pt's father    Discharge Plan:     Date of Last IMM Given: n/a    Reviewed chart. Role of discharge planner explained and patient verbalized understanding. Discharge order is noted. Met with pt and pt's father at bedside with pt permission. Pt confirmed he is returning home and his father is his ride home. He denied all needs from CM, including home care. Caitlyn Parisi RN aware    Has Home O2 in place on admit:  No    Pt is being d/c'd to home today. Pt's O2 sats are 96% on Room air per vitals in epic. Discharge timeout done with [facility].. All discharge needs and concerns addressed.

## 2023-02-14 NOTE — FLOWSHEET NOTE
02/13/23 2028   Vital Signs   Temp 98.2 °F (36.8 °C)   Temp Source Oral   Heart Rate 78   Resp 18   /68   MAP (Calculated) 84   BP Location Left upper arm   BP Method Automatic   Patient Position Sitting   Level of Consciousness 0   MEWS Score 1   Pain Assessment   Pain Assessment None - Denies Pain   Pain Level 8   Oxygen Therapy   SpO2 96 %   Pulse Oximeter Device Mode Intermittent   O2 Device None (Room air)       Assessment done. Patient is alert and oriented. With ongoing blood transfusion. In pain at 8/10.  Call light within reach

## 2023-02-14 NOTE — FLOWSHEET NOTE
02/14/23 0923   Vital Signs   BP (!) 110/58   MAP (Calculated) 75   Pain Assessment   Pain Level 7   Pt requesting pain meds be \" pushed and flushed \" pain \" a 7\"  , pt noted drowsy and  unable to stay awake without encouragement to take PO medication . This RN educated pt on importance to maintain BP and ability for alertness and respiratory status with narcotic pain medication . Pt drifted off to sleep , aroused when asked if he understood concerns voiced \" yes\" .  This RN will monitor pt for SAFE administration of pain control and maintain vitals

## 2023-02-14 NOTE — PLAN OF CARE
Problem: ABCDS Injury Assessment  Goal: Absence of physical injury  Outcome: Progressing  Flowsheets (Taken 2/14/2023 0351)  Absence of Physical Injury: Implement safety measures based on patient assessment     Problem: Discharge Planning  Goal: Discharge to home or other facility with appropriate resources  Outcome: Progressing  Flowsheets (Taken 2/14/2023 0351)  Discharge to home or other facility with appropriate resources: Identify barriers to discharge with patient and caregiver     Problem: Pain  Goal: Verbalizes/displays adequate comfort level or baseline comfort level  Outcome: Not Progressing  Flowsheets (Taken 2/14/2023 0351)  Verbalizes/displays adequate comfort level or baseline comfort level:   Encourage patient to monitor pain and request assistance   Assess pain using appropriate pain scale   Administer analgesics based on type and severity of pain and evaluate response

## 2023-02-17 ENCOUNTER — APPOINTMENT (OUTPATIENT)
Dept: GENERAL RADIOLOGY | Age: 23
DRG: 175 | End: 2023-02-17
Payer: COMMERCIAL

## 2023-02-17 ENCOUNTER — HOSPITAL ENCOUNTER (INPATIENT)
Age: 23
LOS: 1 days | Discharge: HOME | DRG: 175 | End: 2023-02-18
Attending: EMERGENCY MEDICINE | Admitting: INTERNAL MEDICINE
Payer: COMMERCIAL

## 2023-02-17 ENCOUNTER — APPOINTMENT (OUTPATIENT)
Dept: CT IMAGING | Age: 23
DRG: 175 | End: 2023-02-17
Payer: COMMERCIAL

## 2023-02-17 DIAGNOSIS — D64.9 CHRONIC ANEMIA: ICD-10-CM

## 2023-02-17 DIAGNOSIS — I26.99 OTHER ACUTE PULMONARY EMBOLISM WITHOUT ACUTE COR PULMONALE (HCC): Primary | ICD-10-CM

## 2023-02-17 DIAGNOSIS — R94.31 ACUTE ELECTROCARDIOGRAM CHANGES: ICD-10-CM

## 2023-02-17 DIAGNOSIS — M25.551 RIGHT HIP PAIN: ICD-10-CM

## 2023-02-17 DIAGNOSIS — R07.9 RIGHT-SIDED CHEST PAIN: ICD-10-CM

## 2023-02-17 DIAGNOSIS — D57.00 SICKLE CELL PAIN CRISIS (HCC): ICD-10-CM

## 2023-02-17 PROBLEM — J96.01 ACUTE HYPOXEMIC RESPIRATORY FAILURE (HCC): Status: ACTIVE | Noted: 2023-02-17

## 2023-02-17 LAB
A/G RATIO: 1.7 (ref 1.1–2.2)
ALBUMIN SERPL-MCNC: 4.2 G/DL (ref 3.4–5)
ALP BLD-CCNC: 70 U/L (ref 40–129)
ALT SERPL-CCNC: 18 U/L (ref 10–40)
ANION GAP SERPL CALCULATED.3IONS-SCNC: 9 MMOL/L (ref 3–16)
ANTI-XA UNFRAC HEPARIN: 0.26 IU/ML (ref 0.3–0.7)
ANTI-XA UNFRAC HEPARIN: 0.47 IU/ML (ref 0.3–0.7)
APTT: 31.2 SEC (ref 23–34.3)
AST SERPL-CCNC: 45 U/L (ref 15–37)
BASOPHILS ABSOLUTE: 0.1 K/UL (ref 0–0.2)
BASOPHILS RELATIVE PERCENT: 0.7 %
BILIRUB SERPL-MCNC: 8.5 MG/DL (ref 0–1)
BUN BLDV-MCNC: 10 MG/DL (ref 7–20)
CALCIUM SERPL-MCNC: 8.8 MG/DL (ref 8.3–10.6)
CHLORIDE BLD-SCNC: 105 MMOL/L (ref 99–110)
CO2: 27 MMOL/L (ref 21–32)
CREAT SERPL-MCNC: 0.7 MG/DL (ref 0.9–1.3)
EKG ATRIAL RATE: 69 BPM
EKG DIAGNOSIS: NORMAL
EKG P AXIS: 60 DEGREES
EKG P-R INTERVAL: 186 MS
EKG Q-T INTERVAL: 436 MS
EKG QRS DURATION: 100 MS
EKG QTC CALCULATION (BAZETT): 467 MS
EKG R AXIS: -73 DEGREES
EKG T AXIS: 47 DEGREES
EKG VENTRICULAR RATE: 69 BPM
EOSINOPHILS ABSOLUTE: 0.1 K/UL (ref 0–0.6)
EOSINOPHILS RELATIVE PERCENT: 0.7 %
GFR SERPL CREATININE-BSD FRML MDRD: >60 ML/MIN/{1.73_M2}
GLUCOSE BLD-MCNC: 101 MG/DL (ref 70–99)
HCT VFR BLD CALC: 22.2 % (ref 40.5–52.5)
HEMOGLOBIN: 8.3 G/DL (ref 13.5–17.5)
IMMATURE RETIC FRACT: 0.87 (ref 0.21–0.37)
INFLUENZA A: NOT DETECTED
INFLUENZA B: NOT DETECTED
INR BLD: 1.25 (ref 0.87–1.14)
LACTIC ACID, SEPSIS: 0.7 MMOL/L (ref 0.4–1.9)
LACTIC ACID, SEPSIS: 0.7 MMOL/L (ref 0.4–1.9)
LV EF: 55 %
LVEF MODALITY: NORMAL
LYMPHOCYTES ABSOLUTE: 1.6 K/UL (ref 1–5.1)
LYMPHOCYTES RELATIVE PERCENT: 13.4 %
MCH RBC QN AUTO: 35.6 PG (ref 26–34)
MCHC RBC AUTO-ENTMCNC: 37.4 G/DL (ref 31–36)
MCV RBC AUTO: 95.1 FL (ref 80–100)
MONOCYTES ABSOLUTE: 0.9 K/UL (ref 0–1.3)
MONOCYTES RELATIVE PERCENT: 7.7 %
NEUTROPHILS ABSOLUTE: 9.2 K/UL (ref 1.7–7.7)
NEUTROPHILS RELATIVE PERCENT: 77.5 %
PDW BLD-RTO: 25.7 % (ref 12.4–15.4)
PLATELET # BLD: 415 K/UL (ref 135–450)
PMV BLD AUTO: 8.5 FL (ref 5–10.5)
POTASSIUM REFLEX MAGNESIUM: 4.2 MMOL/L (ref 3.5–5.1)
PROTHROMBIN TIME: 15.5 SEC (ref 11.7–14.5)
RBC # BLD: 2.33 M/UL (ref 4.2–5.9)
RETICULOCYTE ABSOLUTE COUNT: 0.24 M/UL
RETICULOCYTE COUNT PCT: 10.36 % (ref 0.5–2.18)
SARS-COV-2 RNA, RT PCR: NOT DETECTED
SODIUM BLD-SCNC: 141 MMOL/L (ref 136–145)
TOTAL PROTEIN: 6.7 G/DL (ref 6.4–8.2)
TROPONIN: <0.01 NG/ML
WBC # BLD: 11.9 K/UL (ref 4–11)

## 2023-02-17 PROCEDURE — 6360000002 HC RX W HCPCS: Performed by: UROLOGY

## 2023-02-17 PROCEDURE — 73502 X-RAY EXAM HIP UNI 2-3 VIEWS: CPT

## 2023-02-17 PROCEDURE — 6360000002 HC RX W HCPCS: Performed by: INTERNAL MEDICINE

## 2023-02-17 PROCEDURE — 96375 TX/PRO/DX INJ NEW DRUG ADDON: CPT

## 2023-02-17 PROCEDURE — 83605 ASSAY OF LACTIC ACID: CPT

## 2023-02-17 PROCEDURE — 99255 IP/OBS CONSLTJ NEW/EST HI 80: CPT | Performed by: INTERNAL MEDICINE

## 2023-02-17 PROCEDURE — 94640 AIRWAY INHALATION TREATMENT: CPT

## 2023-02-17 PROCEDURE — 85025 COMPLETE CBC W/AUTO DIFF WBC: CPT

## 2023-02-17 PROCEDURE — 85520 HEPARIN ASSAY: CPT

## 2023-02-17 PROCEDURE — 71260 CT THORAX DX C+: CPT | Performed by: EMERGENCY MEDICINE

## 2023-02-17 PROCEDURE — 71045 X-RAY EXAM CHEST 1 VIEW: CPT

## 2023-02-17 PROCEDURE — 2700000000 HC OXYGEN THERAPY PER DAY

## 2023-02-17 PROCEDURE — 6360000004 HC RX CONTRAST MEDICATION: Performed by: EMERGENCY MEDICINE

## 2023-02-17 PROCEDURE — 2580000003 HC RX 258: Performed by: UROLOGY

## 2023-02-17 PROCEDURE — 2580000003 HC RX 258: Performed by: INTERNAL MEDICINE

## 2023-02-17 PROCEDURE — 87636 SARSCOV2 & INF A&B AMP PRB: CPT

## 2023-02-17 PROCEDURE — 94761 N-INVAS EAR/PLS OXIMETRY MLT: CPT

## 2023-02-17 PROCEDURE — 6370000000 HC RX 637 (ALT 250 FOR IP): Performed by: INTERNAL MEDICINE

## 2023-02-17 PROCEDURE — 85730 THROMBOPLASTIN TIME PARTIAL: CPT

## 2023-02-17 PROCEDURE — 2580000003 HC RX 258: Performed by: EMERGENCY MEDICINE

## 2023-02-17 PROCEDURE — 2060000000 HC ICU INTERMEDIATE R&B

## 2023-02-17 PROCEDURE — 3E1N38Z IRRIGATION OF MALE REPRODUCTIVE USING IRRIGATING SUBSTANCE, PERCUTANEOUS APPROACH: ICD-10-PCS | Performed by: UROLOGY

## 2023-02-17 PROCEDURE — 87040 BLOOD CULTURE FOR BACTERIA: CPT

## 2023-02-17 PROCEDURE — 99223 1ST HOSP IP/OBS HIGH 75: CPT | Performed by: INTERNAL MEDICINE

## 2023-02-17 PROCEDURE — 85610 PROTHROMBIN TIME: CPT

## 2023-02-17 PROCEDURE — 99285 EMERGENCY DEPT VISIT HI MDM: CPT

## 2023-02-17 PROCEDURE — 93306 TTE W/DOPPLER COMPLETE: CPT

## 2023-02-17 PROCEDURE — 6370000000 HC RX 637 (ALT 250 FOR IP): Performed by: EMERGENCY MEDICINE

## 2023-02-17 PROCEDURE — 6360000002 HC RX W HCPCS: Performed by: EMERGENCY MEDICINE

## 2023-02-17 PROCEDURE — A4216 STERILE WATER/SALINE, 10 ML: HCPCS | Performed by: UROLOGY

## 2023-02-17 PROCEDURE — 93005 ELECTROCARDIOGRAM TRACING: CPT | Performed by: EMERGENCY MEDICINE

## 2023-02-17 PROCEDURE — 84484 ASSAY OF TROPONIN QUANT: CPT

## 2023-02-17 PROCEDURE — 87086 URINE CULTURE/COLONY COUNT: CPT

## 2023-02-17 PROCEDURE — 93010 ELECTROCARDIOGRAM REPORT: CPT | Performed by: INTERNAL MEDICINE

## 2023-02-17 PROCEDURE — 80053 COMPREHEN METABOLIC PANEL: CPT

## 2023-02-17 PROCEDURE — 96376 TX/PRO/DX INJ SAME DRUG ADON: CPT

## 2023-02-17 PROCEDURE — 96374 THER/PROPH/DIAG INJ IV PUSH: CPT

## 2023-02-17 PROCEDURE — 85045 AUTOMATED RETICULOCYTE COUNT: CPT

## 2023-02-17 PROCEDURE — 36415 COLL VENOUS BLD VENIPUNCTURE: CPT

## 2023-02-17 RX ORDER — HYDROMORPHONE HYDROCHLORIDE 2 MG/ML
1.5 INJECTION, SOLUTION INTRAMUSCULAR; INTRAVENOUS; SUBCUTANEOUS
Status: DISCONTINUED | OUTPATIENT
Start: 2023-02-17 | End: 2023-02-18 | Stop reason: HOSPADM

## 2023-02-17 RX ORDER — MAGNESIUM SULFATE 1 G/100ML
1000 INJECTION INTRAVENOUS PRN
Status: DISCONTINUED | OUTPATIENT
Start: 2023-02-17 | End: 2023-02-18 | Stop reason: HOSPADM

## 2023-02-17 RX ORDER — LORAZEPAM 2 MG/ML
0.5 INJECTION INTRAMUSCULAR ONCE
Status: COMPLETED | OUTPATIENT
Start: 2023-02-17 | End: 2023-02-17

## 2023-02-17 RX ORDER — KETOROLAC TROMETHAMINE 30 MG/ML
30 INJECTION, SOLUTION INTRAMUSCULAR; INTRAVENOUS ONCE
Status: COMPLETED | OUTPATIENT
Start: 2023-02-17 | End: 2023-02-17

## 2023-02-17 RX ORDER — ASPIRIN 81 MG/1
324 TABLET, CHEWABLE ORAL ONCE
Status: COMPLETED | OUTPATIENT
Start: 2023-02-17 | End: 2023-02-17

## 2023-02-17 RX ORDER — SODIUM CHLORIDE 0.9 % (FLUSH) 0.9 %
5-40 SYRINGE (ML) INJECTION PRN
Status: DISCONTINUED | OUTPATIENT
Start: 2023-02-17 | End: 2023-02-18 | Stop reason: HOSPADM

## 2023-02-17 RX ORDER — HEPARIN SODIUM 10000 [USP'U]/100ML
1870 INJECTION, SOLUTION INTRAVENOUS CONTINUOUS
Status: DISCONTINUED | OUTPATIENT
Start: 2023-02-17 | End: 2023-02-18

## 2023-02-17 RX ORDER — POTASSIUM CHLORIDE 7.45 MG/ML
10 INJECTION INTRAVENOUS PRN
Status: DISCONTINUED | OUTPATIENT
Start: 2023-02-17 | End: 2023-02-18 | Stop reason: HOSPADM

## 2023-02-17 RX ORDER — HYDROXYUREA 500 MG/1
1000 CAPSULE ORAL 2 TIMES DAILY
Status: DISCONTINUED | OUTPATIENT
Start: 2023-02-17 | End: 2023-02-18 | Stop reason: HOSPADM

## 2023-02-17 RX ORDER — DIPHENHYDRAMINE HCL 25 MG
50 TABLET ORAL EVERY 6 HOURS PRN
Status: DISCONTINUED | OUTPATIENT
Start: 2023-02-17 | End: 2023-02-18 | Stop reason: HOSPADM

## 2023-02-17 RX ORDER — LIDOCAINE HYDROCHLORIDE 20 MG/ML
JELLY TOPICAL
Status: DISPENSED
Start: 2023-02-17 | End: 2023-02-18

## 2023-02-17 RX ORDER — HEPARIN SODIUM 1000 [USP'U]/ML
6800 INJECTION, SOLUTION INTRAVENOUS; SUBCUTANEOUS ONCE
Status: COMPLETED | OUTPATIENT
Start: 2023-02-17 | End: 2023-02-17

## 2023-02-17 RX ORDER — SODIUM CHLORIDE 9 MG/ML
INJECTION, SOLUTION INTRAVENOUS PRN
Status: DISCONTINUED | OUTPATIENT
Start: 2023-02-17 | End: 2023-02-18 | Stop reason: HOSPADM

## 2023-02-17 RX ORDER — SODIUM CHLORIDE 9 MG/ML
30 INJECTION, SOLUTION INTRAVENOUS ONCE
Status: COMPLETED | OUTPATIENT
Start: 2023-02-17 | End: 2023-02-17

## 2023-02-17 RX ORDER — POLYETHYLENE GLYCOL 3350 17 G/17G
17 POWDER, FOR SOLUTION ORAL DAILY PRN
Status: DISCONTINUED | OUTPATIENT
Start: 2023-02-17 | End: 2023-02-18 | Stop reason: HOSPADM

## 2023-02-17 RX ORDER — DEXTROSE AND SODIUM CHLORIDE 5; .45 G/100ML; G/100ML
INJECTION, SOLUTION INTRAVENOUS CONTINUOUS
Status: DISCONTINUED | OUTPATIENT
Start: 2023-02-17 | End: 2023-02-18 | Stop reason: HOSPADM

## 2023-02-17 RX ORDER — ALBUTEROL SULFATE 90 UG/1
2 AEROSOL, METERED RESPIRATORY (INHALATION) EVERY 4 HOURS PRN
Status: DISCONTINUED | OUTPATIENT
Start: 2023-02-17 | End: 2023-02-18 | Stop reason: HOSPADM

## 2023-02-17 RX ORDER — ALBUTEROL SULFATE 90 UG/1
2 AEROSOL, METERED RESPIRATORY (INHALATION) EVERY 4 HOURS PRN
Status: DISCONTINUED | OUTPATIENT
Start: 2023-02-17 | End: 2023-02-17

## 2023-02-17 RX ORDER — KETOROLAC TROMETHAMINE 30 MG/ML
30 INJECTION, SOLUTION INTRAMUSCULAR; INTRAVENOUS EVERY 6 HOURS PRN
Status: DISCONTINUED | OUTPATIENT
Start: 2023-02-17 | End: 2023-02-18 | Stop reason: HOSPADM

## 2023-02-17 RX ORDER — ACETAMINOPHEN 650 MG/1
650 SUPPOSITORY RECTAL EVERY 6 HOURS PRN
Status: DISCONTINUED | OUTPATIENT
Start: 2023-02-17 | End: 2023-02-18 | Stop reason: HOSPADM

## 2023-02-17 RX ORDER — ACETAMINOPHEN 325 MG/1
650 TABLET ORAL EVERY 6 HOURS PRN
Status: DISCONTINUED | OUTPATIENT
Start: 2023-02-17 | End: 2023-02-18 | Stop reason: HOSPADM

## 2023-02-17 RX ORDER — PSEUDOEPHEDRINE HCL 30 MG
60 TABLET ORAL DAILY PRN
Status: DISCONTINUED | OUTPATIENT
Start: 2023-02-17 | End: 2023-02-18 | Stop reason: HOSPADM

## 2023-02-17 RX ORDER — FOLIC ACID 1 MG/1
2 TABLET ORAL DAILY
Status: DISCONTINUED | OUTPATIENT
Start: 2023-02-17 | End: 2023-02-18 | Stop reason: HOSPADM

## 2023-02-17 RX ORDER — HEPARIN SODIUM 1000 [USP'U]/ML
6800 INJECTION, SOLUTION INTRAVENOUS; SUBCUTANEOUS PRN
Status: DISCONTINUED | OUTPATIENT
Start: 2023-02-17 | End: 2023-02-18 | Stop reason: ALTCHOICE

## 2023-02-17 RX ORDER — SODIUM CHLORIDE 0.9 % (FLUSH) 0.9 %
5-40 SYRINGE (ML) INJECTION EVERY 12 HOURS SCHEDULED
Status: DISCONTINUED | OUTPATIENT
Start: 2023-02-17 | End: 2023-02-18 | Stop reason: HOSPADM

## 2023-02-17 RX ORDER — ALBUTEROL SULFATE 90 UG/1
2 AEROSOL, METERED RESPIRATORY (INHALATION) 2 TIMES DAILY
Status: DISCONTINUED | OUTPATIENT
Start: 2023-02-17 | End: 2023-02-18 | Stop reason: HOSPADM

## 2023-02-17 RX ORDER — HEPARIN SODIUM 1000 [USP'U]/ML
3400 INJECTION, SOLUTION INTRAVENOUS; SUBCUTANEOUS ONCE
Status: COMPLETED | OUTPATIENT
Start: 2023-02-17 | End: 2023-02-17

## 2023-02-17 RX ORDER — SILDENAFIL CITRATE 20 MG/1
20 TABLET ORAL DAILY
Status: DISCONTINUED | OUTPATIENT
Start: 2023-02-17 | End: 2023-02-18 | Stop reason: HOSPADM

## 2023-02-17 RX ORDER — ONDANSETRON 4 MG/1
4 TABLET, ORALLY DISINTEGRATING ORAL EVERY 8 HOURS PRN
Status: DISCONTINUED | OUTPATIENT
Start: 2023-02-17 | End: 2023-02-18 | Stop reason: HOSPADM

## 2023-02-17 RX ORDER — HEPARIN SODIUM 1000 [USP'U]/ML
3400 INJECTION, SOLUTION INTRAVENOUS; SUBCUTANEOUS PRN
Status: DISCONTINUED | OUTPATIENT
Start: 2023-02-17 | End: 2023-02-18 | Stop reason: ALTCHOICE

## 2023-02-17 RX ORDER — ONDANSETRON 2 MG/ML
4 INJECTION INTRAMUSCULAR; INTRAVENOUS EVERY 6 HOURS PRN
Status: DISCONTINUED | OUTPATIENT
Start: 2023-02-17 | End: 2023-02-18 | Stop reason: HOSPADM

## 2023-02-17 RX ADMIN — HEPARIN SODIUM 1530 UNITS/HR: 10000 INJECTION, SOLUTION INTRAVENOUS at 05:35

## 2023-02-17 RX ADMIN — SILDENAFIL CITRATE 20 MG: 20 TABLET ORAL at 09:36

## 2023-02-17 RX ADMIN — PHENYLEPHRINE HYDROCHLORIDE: 10 INJECTION INTRAVENOUS at 17:07

## 2023-02-17 RX ADMIN — LORAZEPAM 0.5 MG: 2 INJECTION INTRAMUSCULAR; INTRAVENOUS at 13:57

## 2023-02-17 RX ADMIN — HYDROMORPHONE HYDROCHLORIDE 1.5 MG: 2 INJECTION, SOLUTION INTRAMUSCULAR; INTRAVENOUS; SUBCUTANEOUS at 08:45

## 2023-02-17 RX ADMIN — DEXTROSE AND SODIUM CHLORIDE: 5; 450 INJECTION, SOLUTION INTRAVENOUS at 17:32

## 2023-02-17 RX ADMIN — KETOROLAC TROMETHAMINE 30 MG: 30 INJECTION, SOLUTION INTRAMUSCULAR; INTRAVENOUS at 20:56

## 2023-02-17 RX ADMIN — HEPARIN SODIUM 3400 UNITS: 1000 INJECTION INTRAVENOUS; SUBCUTANEOUS at 21:01

## 2023-02-17 RX ADMIN — HYDROXYUREA 1000 MG: 500 CAPSULE ORAL at 21:02

## 2023-02-17 RX ADMIN — HYDROMORPHONE HYDROCHLORIDE 1.5 MG: 2 INJECTION, SOLUTION INTRAMUSCULAR; INTRAVENOUS; SUBCUTANEOUS at 15:25

## 2023-02-17 RX ADMIN — SODIUM CHLORIDE 30 ML/KG/HR: 9 INJECTION, SOLUTION INTRAVENOUS at 03:22

## 2023-02-17 RX ADMIN — PSEUDOEPHEDRINE HCL 60 MG: 30 TABLET, FILM COATED ORAL at 12:51

## 2023-02-17 RX ADMIN — IOPAMIDOL 85 ML: 755 INJECTION, SOLUTION INTRAVENOUS at 04:11

## 2023-02-17 RX ADMIN — HYDROXYUREA 1000 MG: 500 CAPSULE ORAL at 11:05

## 2023-02-17 RX ADMIN — HEPARIN SODIUM 6800 UNITS: 1000 INJECTION INTRAVENOUS; SUBCUTANEOUS at 05:31

## 2023-02-17 RX ADMIN — Medication 10 ML: at 21:00

## 2023-02-17 RX ADMIN — FOLIC ACID 2 MG: 1 TABLET ORAL at 09:36

## 2023-02-17 RX ADMIN — DEXTROSE AND SODIUM CHLORIDE: 5; 450 INJECTION, SOLUTION INTRAVENOUS at 09:35

## 2023-02-17 RX ADMIN — HYDROMORPHONE HYDROCHLORIDE 0.5 MG: 1 INJECTION, SOLUTION INTRAMUSCULAR; INTRAVENOUS; SUBCUTANEOUS at 17:05

## 2023-02-17 RX ADMIN — HYDROMORPHONE HYDROCHLORIDE 1 MG: 1 INJECTION, SOLUTION INTRAMUSCULAR; INTRAVENOUS; SUBCUTANEOUS at 12:03

## 2023-02-17 RX ADMIN — ASPIRIN 324 MG: 81 TABLET, CHEWABLE ORAL at 03:24

## 2023-02-17 RX ADMIN — HYDROMORPHONE HYDROCHLORIDE 1 MG: 1 INJECTION, SOLUTION INTRAMUSCULAR; INTRAVENOUS; SUBCUTANEOUS at 05:29

## 2023-02-17 RX ADMIN — KETOROLAC TROMETHAMINE 30 MG: 30 INJECTION, SOLUTION INTRAMUSCULAR; INTRAVENOUS at 03:24

## 2023-02-17 RX ADMIN — HYDROMORPHONE HYDROCHLORIDE 1 MG: 1 INJECTION, SOLUTION INTRAMUSCULAR; INTRAVENOUS; SUBCUTANEOUS at 03:31

## 2023-02-17 RX ADMIN — KETOROLAC TROMETHAMINE 30 MG: 30 INJECTION, SOLUTION INTRAMUSCULAR; INTRAVENOUS at 13:50

## 2023-02-17 RX ADMIN — Medication 2 PUFF: at 19:06

## 2023-02-17 RX ADMIN — HEPARIN SODIUM 1700 UNITS/HR: 10000 INJECTION, SOLUTION INTRAVENOUS at 21:04

## 2023-02-17 ASSESSMENT — PAIN SCALES - GENERAL
PAINLEVEL_OUTOF10: 9
PAINLEVEL_OUTOF10: 8
PAINLEVEL_OUTOF10: 9
PAINLEVEL_OUTOF10: 9
PAINLEVEL_OUTOF10: 10
PAINLEVEL_OUTOF10: 9
PAINLEVEL_OUTOF10: 6
PAINLEVEL_OUTOF10: 9
PAINLEVEL_OUTOF10: 8

## 2023-02-17 ASSESSMENT — PAIN DESCRIPTION - FREQUENCY
FREQUENCY: CONTINUOUS
FREQUENCY: CONTINUOUS

## 2023-02-17 ASSESSMENT — PAIN DESCRIPTION - LOCATION
LOCATION: CHEST
LOCATION: PENIS
LOCATION: CHEST
LOCATION: CHEST;HIP
LOCATION: CHEST
LOCATION: CHEST
LOCATION: PENIS
LOCATION: PENIS
LOCATION: HIP;CHEST
LOCATION: CHEST

## 2023-02-17 ASSESSMENT — PAIN - FUNCTIONAL ASSESSMENT
PAIN_FUNCTIONAL_ASSESSMENT: ACTIVITIES ARE NOT PREVENTED
PAIN_FUNCTIONAL_ASSESSMENT: ACTIVITIES ARE NOT PREVENTED
PAIN_FUNCTIONAL_ASSESSMENT: 0-10

## 2023-02-17 ASSESSMENT — PAIN DESCRIPTION - DESCRIPTORS: DESCRIPTORS: SHARP;ACHING

## 2023-02-17 ASSESSMENT — PAIN DESCRIPTION - ORIENTATION: ORIENTATION: RIGHT

## 2023-02-17 NOTE — ACP (ADVANCE CARE PLANNING)
Advance Care Planning     General Advance Care Planning (ACP) Conversation    Date of Conversation: 2/17/2023  Conducted with: Patient with Decision Making Capacity; confirmed with RN that pt is alert and oriented    Healthcare Decision Maker:    Primary Decision Maker: Berkley Javed - [facility] - [phone]  Click here to complete Healthcare Decision Makers including selection of the Healthcare Decision Maker Relationship (ie \"Primary\"). Today we documented Decision Maker(s) consistent with Legal Next of Kin hierarchy. Content/Action Overview:    Reviewed DNR/DNI and patient elects Full Code (Attempt Resuscitation)  ventilation preferences  Pt stated he would want a vent if medically necessary.      Delfina RN stated pt already had confirmed full code with her    Length of Voluntary ACP Conversation in minutes:  <16 minutes (Non-Billable)    BC Samuels  Case Management Department  Phone: [phone] Fax: [phone]

## 2023-02-17 NOTE — FLOWSHEET NOTE
02/17/23 1400   Vital Signs   Temp 98.5 °F (36.9 °C)   Temp Source Oral   Heart Rate 78   Heart Rate Source Monitor   Resp 16   /71   MAP (Calculated) 89   BP Method Automatic   Level of Consciousness 0   MEWS Score 1   Pain Assessment   Pain Level 10   Pain Location Penis   Oxygen Therapy   SpO2 100 %   Pulse Oximetry Type Intermittent   Pulse via Oximetry 71 beats per minute   Pulse Oximeter Device Mode Intermittent   O2 Device High flow nasal cannula   Skin Assessment Clean, dry, & intact   O2 Flow Rate (L/min) 10 L/min   PT screaming pain from erection urology in to see pt to plan procedure to inject medication to stop erection. Pt aware of plan

## 2023-02-17 NOTE — PROGRESS NOTES
Anti xa resulted 2/17 @ 0912 @ 0.47    Continue current rate of heparin drip @ 1530 units/hr (15.3 ml/hr)  Recheck anti-xa level @ 1530

## 2023-02-17 NOTE — PROGRESS NOTES
Patient pacing room grabbing left chest stating pain is a 9 asking for pain medication-medicated with Dilaudid at this time.

## 2023-02-17 NOTE — H&P
Hospital Medicine History & Physical      PCP: Johnnie Canas MD    Date of Admission: 2/17/2023    Date of Service: Pt seen/examined on 02/17/23      Chief Complaint:    Chief Complaint   Patient presents with    Sickle Cell Pain Crisis     States sickle cell crisisand fever that started last pm 2200         History Of Present Illness: The patient is a 20 y.o. male with PMH of asthma, cardiomegaly, DVT, sickle cell disease, priapism due to sickle cell disease who presents to [facility] with sickle cell pain. History obtained from the patient and review of EMR. Patient was just recently admitted with sickle cell crisis. His presentation at that time was left knee pain. Discharged on 2/14. Patient is supposed to be on Hydrea and sildenafil. Not compliant. He was resumed on these medications and prescribed the same on discharge. He was given IV pain medications and IV fluids and oxycodone while here in the hospital.  He has a management plan in place and hence no pain medications given on discharge. Patient presents back with complaints of chest pain and shortness of breath. He states has been going on for 2 days. Describes midsternal chest pain radiating to the back, associated with shortness of breath. He is unable to describe the pain further or just tell me what the intensity is . Chest pain and shortness of breath was associated with fevers. Chest pain does not radiate anywhere. Denies any knee pain now, but does complain of right hip pain. Sherryle Moder CTPA imaging in the ED showed acute pulmonary embolism. Patient has a previous history of DVT. Sherryle Moder Patient now started on anticoagulation and admitted.    Heme-onc and pulmonary consulted    Past Medical History:        Diagnosis Date    Accidental overdose     Asthma     DVT (deep venous thrombosis) ([facility] [place] [address] 10/19/2021    R leg    Enlarged heart     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)     Sickle cell anemia (HCC)        Past Surgical History:        Procedure Laterality Date    ABSCESS DRAINAGE Left 2012    leg    SPLENECTOMY         Medications Prior to Admission:    Prior to Admission medications    Medication Sig Start Date End Date Taking? Authorizing Provider   hydroxyurea (HYDREA) 500 MG chemo capsule Take 2 capsules by mouth 2 times daily 2/14/23   Rachel Wu MD   pseudoephedrine (SUDAFED) 60 MG tablet Take 1 tablet by mouth daily as needed (priapism) 2/14/23   Rachel Wu MD   sildenafil (REVATIO) 20 MG tablet Take 1 tablet by mouth daily 10/24/22 11/23/22  CARMELITA Sánchez CNP   folic acid (FOLVITE) 1 MG tablet Take 2 tablets by mouth daily 9/16/21   CARMELITA Tran CNP   oxyCODONE HCl (OXY-IR) 10 MG immediate release tablet Take 10 mg by mouth every 4 hours as needed for Pain. Historical Provider, MD   albuterol sulfate HFA (PROVENTIL;VENTOLIN;PROAIR) 108 (90 Base) MCG/ACT inhaler Albuterol HFA Inhaler 90 mcg/actuation  inhaled  2 puffs prn     Active    Historical Provider, MD   levalbuterol (XOPENEX) 0.31 MG/3ML nebulization Levalbuterol Nebulized    2 puffs prn     Active    Historical Provider, MD       Allergies:  Morphine    Social History:  The patient currently lives at home    TOBACCO:   reports that he has never smoked. He has never been exposed to tobacco smoke. He has never used smokeless tobacco.  ETOH:   reports that he does not currently use alcohol.       Family History:   Positive as follows:        Problem Relation Age of Onset    Other Father         sarcoidosis       REVIEW OF SYSTEMS:       Constitutional:  positive for fever   HENT: Negative for sore throat   Eyes: Negative for redness   Respiratory: Positive for dyspnea, no cough   Cardiovascular: Positive for chest pain   Gastrointestinal: Negative for vomiting, diarrhea   Genitourinary: Negative for hematuria   Musculoskeletal: Negative for arthralgias    + hip pain  Skin: Negative for rash   Neurological: Negative for syncope Hematological: Negative for adenopathy   Psychiatric/Behavorial: Negative for anxiety    PHYSICAL EXAM:    BP (!) 113/45   Pulse 62   Temp 98 °F (36.7 °C)   Resp 14   Ht 5' 9\" (1.753 m)   Wt 187 lb (84.8 kg)   SpO2 97%   BMI 27.62 kg/m²     Gen: 71-year-old -American male . He appears very sleepy and lethargic he has been medicated with pain medications . does awaken easily and respond to me . Awake and well-oriented , in no distress. Eyes: PERRL. No sclera icterus. No conjunctival injection. ENT: No discharge. Pharynx clear. Neck: No JVD. No Carotid Bruit. Trachea midline. Resp: No accessory muscle use. No crackles. No wheezes. No rhonchi. CV: Regular rate. Regular rhythm. No murmur. No rub. No edema. GI: Non-tender. Non-distended. No masses. No organomegaly. Normal bowel sounds. No hernia. Skin: Warm and dry. No nodule on exposed extremities. No rash on exposed extremities. M/S: No cyanosis. No joint deformity. No clubbing. Neuro: Awake. Grossly nonfocal    Psych: Oriented x 3. No anxiety or agitation.      CBC:   Recent Labs     02/17/23 0305   WBC 11.9*   HGB 8.3*   HCT 22.2*   MCV 95.1        BMP:   Recent Labs     02/17/23 0305      K 4.2      CO2 27   BUN 10   CREATININE 0.7*     LIVER PROFILE:   Recent Labs     02/17/23 0305   AST 45*   ALT 18   BILITOT 8.5*   ALKPHOS 70     PT/INR:   Recent Labs     02/17/23 0305   PROTIME 15.5*   INR 1.25*     APTT:   Recent Labs     02/17/23 0305   APTT 31.2       CARDIAC ENZYMES  Recent Labs     02/17/23 0305 02/17/23  0703   TROPONINI <0.01 <0.01       U/A:    Lab Results   Component Value Date/Time    COLORU Yellow 02/10/2023 01:06 AM    WBCUA None seen 02/10/2023 01:06 AM    RBCUA 0-2 02/10/2023 01:06 AM    MUCUS 1+ 05/27/2022 07:00 AM    BACTERIA Rare 02/10/2023 01:06 AM    CLARITYU Clear 02/10/2023 01:06 AM    SPECGRAV 1.010 02/10/2023 01:06 AM    LEUKOCYTESUR Negative 02/10/2023 01:06 AM    BLOODU MODERATE 02/10/2023 01:06 AM    GLUCOSEU Negative 02/10/2023 01:06 AM    AMORPHOUS Rare 03/04/2022 07:53 PM       ABG    Lab Results   Component Value Date/Time    ZRQ2CBU 26.9 09/26/2020 07:57 PM    BEART 1.8 09/26/2020 07:57 PM    D3HCVGCH 88.5 09/26/2020 07:57 PM    PHART 7.396 09/26/2020 07:57 PM    TPZ0NSG 44.8 09/26/2020 07:57 PM    PO2ART 69.8 09/26/2020 07:57 PM    PCK5IAB 28.3 09/26/2020 07:57 PM       CULTURES  COVID/Influenza: not detected    Blood Culture: sent     EKG:    Normal sinus rhythmleft axisLeft anterior fascicular blockVoltage criteria for left ventricular hypertrophyNonspecific ST and T wave abnormalityWhen compared with ECG of 10-FEB-2023 15:09,Left anterior fascicular block is now PresentNonspecific T wave abnormality now evident in Anterior leadsConfirmed by ANTONIETTA Rader MD ([id number]) on 2/17/2023 7:27:49 AM     RADIOLOGY  CT CHEST PULMONARY EMBOLISM W CONTRAST   Final Result   1. There are two small filling defects within left lower lobe segmental   pulmonary arteries concerning for pulmonary emboli. 2.  Minimal patchy ground-glass opacities of the left lung base are similar   in comparison with multiple prior examinations, favored secondary to chronic   change in the setting of sickle cell disease. XR HIP RIGHT (2-3 VIEWS)   Final Result   No evidence of acute osseous abnormality involving the pelvis or right hip. XR CHEST PORTABLE   Final Result   No acute cardiopulmonary abnormality. Mild cardiomegaly. Records from recent admission reviewed    Principal Problem:    Pulmonary embolism, other, unspecified chronicity, unspecified whether acute cor pulmonale present ([address]  Resolved Problems:    * No resolved hospital problems.  *        ASSESSMENT/PLAN:    #Left Lower Lobe Pulmonary Embolism   #Hx of DVT  #Chest Pain   - CTPA as above  - pt was not on any anticoagulation prior to admission   - started on Heparin gtt  - oncology and pulmonary consulted   - +fever at home   - monitor on telemetry and continuous pulse ox   - echo ordered   -Seen by heme-onc. Likely can be transitioned to oral anticoagulants in a.m. .   Will need lifelong anticoagulation    #Mild leukocytosis  - likely 2/2 above  Monitor    #Abnormal EKG  - troponin negative x2  - EKG with new anterior fascicular block  - monitor on telemetry  - echo ordered    #Sickle cell crisis  #Sickle cell anemia  #Right hip pain   -presented with chest pain and right hip pain   -longstanding issue of med non-compliance   -Heme-onc consulted  -Hemoglobin-> 8.3   -IVF and pain control w/ dilaudid and toradol  - Supposed to be on chronic Hydrea for sickle cell anemia. Concern about compliance. Clarified dose with patient -he says he takes Hydrea 1000 mg twice daily. Patient was resumed on Hydrea last admission.   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  -recent admission and patient was non-compliant w/ prescribed sildenafil  -has had success in past with sudafed, alkalinization, ice packs and supplemental O2  -Recent admission urology  recommendation is to continue sildenafil 20 mg daily, and Sudafed as needed and follow-up as needed      #LFT elevation, chronic  -bilirubin 8.5; AST 45  -likely 2/2 sickle cell  -patient denies RUQ pain   -Monitor     #Asthma  -without acute exacerbation   -continue home inhalers     #GERD  -no longer taking PPI      #Vitamin D deficiency      Pt  has a management plan in place    Hx of splenectomy     DVT Prophylaxis: Heparin   Diet: Diet NPO Exceptions are: [employer], Sips of Water with Terry Meier MD   2/17/2023

## 2023-02-17 NOTE — CONSULTS
[facility]    Consult Note      Requesting Physician:  Dr. Fahad Cardona:  chest pain      HISTORY OF PRESENT ILLNESS:    Mr. Liu Phan  is a 20 y.o. male we are seeing in consultation for sickle cell disease and new PE. He was just discharged earlier this week after a pain crisis. He was transfused 1 unit PRBC 2/13/23. He has had on-going issues with compliance and pain management. He visits the ER frequently every 1-2 weeks for pain management and priapism. He is not taking hydrea and restarted this last month but admits he usually does not take it. He is on oxycodone at home. He came back in this morning when he awoke with chest pain and reported fever. He had a CTPA which showed 2 small filling defects concerning for PE. He is now on a heparin gtt and 02. He had a DVT in 2021. ICD-10-CM    1. Other acute pulmonary embolism without acute cor pulmonale (HCC)  I26.99       2. Right-sided chest pain  R07.9       3. Sickle cell pain crisis ([facility] [address]  D57.00       4. Right hip pain  M25.551       5. Chronic anemia  D64.9       6.  Acute electrocardiogram changes  R94.31              Past Medical History:  Past Medical History:   Diagnosis Date    Accidental overdose     Asthma     DVT (deep venous thrombosis) ([facility] [address] 10/19/2021    R leg    Enlarged heart     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)     Sickle cell anemia (HCC)        Past Surgical History:  Past Surgical History:   Procedure Laterality Date    ABSCESS DRAINAGE Left 2012    leg    SPLENECTOMY         Current Medications:  Current Facility-Administered Medications   Medication Dose Route Frequency Provider Last Rate Last Admin    heparin (porcine) injection 6,800 Units  6,800 Units IntraVENous PRN Doug Reyna MD        heparin (porcine) injection 3,400 Units  3,400 Units IntraVENous PRN Fernando Godinez MD Melissa        heparin 25,000 units in dextrose 5% 250 mL (premix) infusion  1,530 Units/hr IntraVENous Continuous Sang Arvizu MD 15.3 mL/hr at 02/17/23 0535 1,530 Units/hr at 02/17/23 0535    hydroxyurea (HYDREA) chemo capsule 1,000 mg  1,000 mg Oral BID Sang Arvizu MD        folic acid (FOLVITE) tablet 2 mg  2 mg Oral Daily Sang Arvizu MD        albuterol sulfate HFA (PROVENTIL;VENTOLIN;PROAIR) 108 (90 Base) MCG/ACT inhaler 2 puff  2 puff Inhalation Q4H PRN Sang Arvizu MD        pseudoephedrine (SUDAFED) tablet 60 mg  60 mg Oral Daily PRN Sang Arvizu MD        sildenafil (REVATIO) tablet 20 mg  20 mg Oral Daily Sang Arvizu MD        sodium chloride flush 0.9 % injection 5-40 mL  5-40 mL IntraVENous 2 times per day Sang Arvizu MD        sodium chloride flush 0.9 % injection 5-40 mL  5-40 mL IntraVENous PRN Sang Arvizu MD        0.9 % sodium chloride infusion   IntraVENous PRN Sang Arvizu MD        ondansetron (ZOFRAN-ODT) disintegrating tablet 4 mg  4 mg Oral Q8H PRN Sang Arvizu MD        Or    ondansetron [facility]) injection 4 mg  4 mg IntraVENous Q6H PRN Sang Arvizu MD        polyethylene glycol (GLYCOLAX) packet 17 g  17 g Oral Daily PRN Sang Arvizu MD        acetaminophen (TYLENOL) tablet 650 mg  650 mg Oral Q6H PRN Sang Arvizu MD        Or    acetaminophen (TYLENOL) suppository 650 mg  650 mg Rectal Q6H PRN Sang Arvizu MD        dextrose 5 % and 0.45 % sodium chloride infusion   IntraVENous Continuous Sang Arvizu MD        potassium chloride 10 mEq/100 mL IVPB (Peripheral Line)  10 mEq IntraVENous PRN Sang Arvizu MD        magnesium sulfate 1000 mg in dextrose 5% 100 mL IVPB  1,000 mg IntraVENous PRN Sang Arvizu MD        diphenhydrAMINE (BENADRYL) tablet 50 mg  50 mg Oral Q6H PRN Sang Arvizu MD        HYDROmorphone (DILAUDID) injection 1 mg  1 mg IntraVENous Q3H PRN Sang Arvizu MD        Or HYDROmorphone HCl PF (DILAUDID) injection 1.5 mg  1.5 mg IntraVENous Q3H PRN Sang Arvizu MD        ketorolac (TORADOL) injection 30 mg  30 mg IntraVENous Q6H PRN Sang Arvizu MD           Allergies: Allergies   Allergen Reactions    Morphine Shortness Of Breath     Other reaction(s): Histamine-like Reaction  Has asthma exacerbation with morphine-histamine type reaction         Social History:  Social History     Socioeconomic History    Marital status: Single     Spouse name: Not on file    Number of children: 0    Years of education: Not on file    Highest education level: Not on file   Occupational History    Not on file   Tobacco Use    Smoking status: Never     Passive exposure: Never    Smokeless tobacco: Never   Vaping Use    Vaping Use: Never used   Substance and Sexual Activity    Alcohol use: Not Currently    Drug use: Never    Sexual activity: Not Currently     Partners: Female   Other Topics Concern    Not on file   Social History Narrative    Not on file     Social Determinants of Health     Financial Resource Strain: Not on file   Food Insecurity: Not on file   Transportation Needs: Not on file   Physical Activity: Not on file   Stress: Not on file   Social Connections: Not on file   Intimate Partner Violence: Not on file   Housing Stability: Not on file          Family History:  Family History   Problem Relation Age of Onset    Other Father         sarcoidosis       REVIEW OF SYSTEMS:      Constitutional: Denies fever, sweats, weight loss  Eyes: No visual changes or diplopia. No scleral icterus. Ent: No headaches, hearing loss or vertigo. No mouth sores or sore throat. Cardiovascular: No chest pain, dyspnea on exertion, palpitations or loss of consciousness. Respiratory: + chest pain and SOB  Gastrointestinal: No abdominal pain, appetite loss, blood in stools. No change in bowel habits. Genitourinary: No dysuria, trouble voiding, or hematuria.   Musculoskeletal: No generalized weakness. No joint complaints. Integumentary: No rash or pruritus. Neurological: No headache, diplopia. No change in gait, balance, or coordination. No paresthesias. Endocrine: No temperature intolerance. No excessive thirst, fluid intake, urination. Hematologic/lymphatic: No abnormal bruising or ecchymosis, blood clots or swollen lymph nodes. Allergic/immunologic: No nasal congestion or hives. PHYSICAL EXAM:      Vitals:  Patient Vitals for the past 24 hrs:   BP Temp Temp src Pulse Resp SpO2 Height Weight   02/17/23 0650 (!) 113/45 98 °F (36.7 °C) -- 62 14 97 % -- --   02/17/23 0536 (!) 124/55 98.4 °F (36.9 °C) -- 68 -- 97 % -- --   02/17/23 0402 123/81 99.6 °F (37.6 °C) -- 92 18 97 % -- --   02/17/23 0301 (!) 107/49 99.8 °F (37.7 °C) Oral 93 17 94 % -- --   02/17/23 0234 130/71 (!) 101.6 °F (38.7 °C) Oral (!) 105 16 (!) 89 % 5' 9\" (1.753 m) 187 lb (84.8 kg)           CONSTITUTIONAL: awake, alert, cooperative, no apparent distress   EYES: pupils equal, round and reactive to light, sclera clear and conjunctiva normal  ENT: Normocephalic, without obvious abnormality, atraumatic  NECK: supple, symmetrical, no jugular venous distension and no carotid bruits   HEMATOLOGIC/LYMPHATIC: no cervical, supraclavicular or axillary lymphadenopathy   LUNGS: CTAB   CARDIOVASCULAR: regular rate and rhythm, normal S1 and S2, no murmur noted  ABDOMEN: normal bowel sounds x 4, soft, non-distended, non-tender, no masses palpated, no hepatosplenomegaly   MUSCULOSKELETAL: full range of motion noted, tone is normal  NEUROLOGIC: awake, alert, oriented to name, place and time. Motor skills grossly intact. SKIN: Normal skin color, texture, turgor and no jaundice.  appears intact   EXTREMITIES: no LE edema       DATA:    PT/INR:    Recent Labs     02/17/23  0305 02/13/23  0448 02/11/23  0423 02/09/23  1853 01/28/23  1011   PROT 6.7 6.3* 6.7 7.8 6.9   INR 1.25*  --   --   --   --      PTT:    Recent Labs     02/17/23  0305 APTT 31.2       CMP:    Recent Labs     02/17/23  0305      K 4.2      CO2 27   BUN 10   PROT 6.7     Mg:  No results for input(s): MG in the last 720 hours. Lab Results   Component Value Date    CALCIUM 8.8 02/17/2023    PHOS 4.5 10/20/2021       CBC:    Recent Labs     02/17/23  0305 02/14/23  0738 02/14/23  0034 02/13/23  0448 02/12/23  0442 02/11/23  0423 02/09/23 2049   WBC 11.9* 15.0*  --  12.7* 12.2* 12.4* 15.2*   NEUTROABS 9.2* 9.2*  --  8.0*  --  7.5 13.4*   LYMPHOPCT 13.4 26.0  --  21.4  --  26.0 9.0   RBC 2.33* 2.46*  --  1.98* 2.02* 2.15* 2.43*   HGB 8.3* 8.3* 9.5* 7.0* 7.2* 7.7* 8.8*   HCT 22.2* 23.3* 25.8* 19.6* 20.1* 21.4* 23.6*   MCV 95.1 94.8  --  99.0 99.3 99.6 97.0   MCH 35.6* 33.7  --  35.4* 35.6* 35.8* 36.0*   MCHC 37.4* 35.5  --  35.7 35.8 35.9 37.2*   RDW 25.7* 22.9*  --  21.7* 22.4* 23.9* 28.5*    384  --  304 291 322 388        LDH:No results for input(s): LDH in the last 720 hours. Radiology Review:  CT CHEST PULMONARY EMBOLISM W CONTRAST  Narrative: EXAMINATION:  CTA OF THE CHEST 2/17/2023 4:27 am    TECHNIQUE:  CTA of the chest was performed after the administration of intravenous  contrast.  Multiplanar reformatted images are provided for review. MIP  images are provided for review. Automated exposure control, iterative  reconstruction, and/or weight based adjustment of the mA/kV was utilized to  reduce the radiation dose to as low as reasonably achievable. COMPARISON:  08/27/2022 CT chest    HISTORY:  ORDERING SYSTEM PROVIDED HISTORY: Chest Pain, sickle cell disease  TECHNOLOGIST PROVIDED HISTORY:  Reason for exam:->Chest Pain  Decision Support Exception - unselect if not a suspected or confirmed  emergency medical condition->Emergency Medical Condition (MA)  Reason for Exam: sob chest pain    FINDINGS:  Pulmonary Arteries: Small filling defects are seen within two left lower lobe  segmental pulmonary arteries (series 2, images 133 and 142).   Main pulmonary  artery is normal in caliber. Mediastinum: No evidence of mediastinal lymphadenopathy. The heart and  pericardium demonstrate no acute abnormality. There is no acute abnormality  of the thoracic aorta. Lungs/pleura: Scattered linear subsegmental atelectasis. Minimal patchy  ground-glass opacities of the left lung base are similar in comparison with  multiple prior examinations, favored to represent chronic change given  history of sickle cell disease. No focal consolidation. No evidence of  pleural effusion or pneumothorax. Upper Abdomen: Limited images of the upper abdomen are unremarkable. Soft Tissues/Bones: Bilateral gynecomastia. Impression: 1. There are two small filling defects within left lower lobe segmental  pulmonary arteries concerning for pulmonary emboli. 2.  Minimal patchy ground-glass opacities of the left lung base are similar  in comparison with multiple prior examinations, favored secondary to chronic  change in the setting of sickle cell disease. XR HIP RIGHT (2-3 VIEWS)  Narrative: EXAMINATION:  TWO XRAY VIEWS OF THE RIGHT HIP    2/17/2023 3:05 am    COMPARISON:  04/26/2022 right hip radiographs. HISTORY:  ORDERING SYSTEM PROVIDED HISTORY: right hip pain, hx sickle cell  TECHNOLOGIST PROVIDED HISTORY:  Reason for exam:->right hip pain, hx sickle cell  Reason for Exam: right hip pain    FINDINGS:  No acute fractures or dislocations in the pelvis or right hip. Bilateral  femoral head contours and hip joints are intact. No significant degenerative  change. Symphysis pubis is not widened. Bilateral sacroiliac joints are  intact. No appreciable soft tissue abnormality. Impression: No evidence of acute osseous abnormality involving the pelvis or right hip. XR CHEST PORTABLE  Narrative: EXAMINATION:  ONE XRAY VIEW OF THE CHEST    2/17/2023 3:21 am    COMPARISON:  Chest portable February 10, 2023.     HISTORY:  ORDERING SYSTEM PROVIDED HISTORY: dyspnea  TECHNOLOGIST PROVIDED HISTORY:  Reason for exam:->dyspnea  Reason for Exam: sob    FINDINGS:  The heart is mildly enlarged with otherwise unremarkable configuration. The  mediastinal contours are within normal limits. The lungs are well aerated. The pleural surfaces are normal and no evidence  of a pleural effusion is seen. No acute bone or soft tissue abnormality is noted. Impression: No acute cardiopulmonary abnormality. Mild cardiomegaly. Problem List  Patient Active Problem List   Diagnosis    Sickle cell pain crisis ([facility] [address]    Asthma    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 ([facility] [address]    Sepsis ([facility] [address]    Opiate overdose ([facility] [address]    Opiate antagonist poisoning, accidental or unintentional, initial encounter    Leukocytosis    Hypoxia    Anemia    Other chest pain    Pneumonia due to infectious organism    Fever    Chronic prescription opiate use    Right leg pain    Dental infection    Sickle cell crisis ([facility] [address]    History of DVT in adulthood    Overweight (BMI 25.0-29. 9)    Chest pain    Abnormal EKG    Acute chest syndrome due to sickle-cell disease (HCC)    Priapism    Intractable pain    Gastroesophageal reflux disease without esophagitis    Non-compliance    Sickle cell anemia (HCC)    Pulmonary embolism, other, unspecified chronicity, unspecified whether acute cor pulmonale present ([facility] [address]       IMPRESSION/RECOMMENDATIONS:    Sickle cell crisis:  -He is noncompliant and has not been taking his Hydrea-He quit taking his Hydrea  -IV crizanlizumab was stopped for non-compliance and lack of efficacy  -He refuses to see a pain specialist   -He refuses a support group as supplied by his insurance company  -His narcotics have to be carefully controlled for concern of an overdose.  -getting IV dilaudid and toradol  -IVF  -1 unit PRBC given 2/13/23        Priapism  - recurrent from sickle cell disease  - urology following last admission  - improved with sudafed  - on daily sidenafil  - can give sudafed if recurs    PE:  - seen on CTPA 2/17/23  - on heparin gtt  - on 02  - can switch to NOAC at discharge  - had a previous DVT in 2021  - likely needs lifelong anticoagulation    Thank you for asking me to see the patient.        Deedee Galvez MD  Please Contact Through [employer]

## 2023-02-17 NOTE — CONSULTS
Pharmacy to Manage Heparin Infusion per Hospital Policy    Dx: PE  Pt wt = 84.8 kg  Baseline aPTT = forthcoming  Platelets: 769    High Dose Heparin Infusion  Heparin 80 units/kg IVP bolus followed by Heparin infusion at 18 units/kg/hr (recommended initial max dose 2100 units/hr). Recheck aPTT in 6 hours. Goal anti-Xa = 0.3 - 0.7  Initial Bolus Dose: 6800 units  Initial Drip Rate: 1530 units/hr  Next aPTT @ 1200    Thank you for this consult. Pharmacy will continue to monitor.      Zoltan CroweD, [facility], 2/17/2023 5:10 AM

## 2023-02-17 NOTE — PLAN OF CARE
20 yoM w/ Hx sickle disease, DVT (no longer on AC), cardiomyopathy, priapism, non-compliance p/w CP, fever, R hip pain, mild HA. He is followed by Dr. Christo Dougherty (Hem) and Dr. Terry Pittman (Uro). Please note he has a Care Plan in [facility] regarding PM in this pt. Hep gtt. Hem and pulm c/s. PE's  Sickle crisis  Fever  WBC  Anemia, stable.

## 2023-02-17 NOTE — PROGRESS NOTES
Anti-Xa was drawn at 1745, resulted at 0.26. Give a 3,400 unit IV bolus and increase th e infusion to 1,700 units/hr. Recheck the anti-Xa at 0100 2/18/23  Desired lab range is 0.3-0.7IU/mL.   ISAAC [facility] PharmD 2/17/2023 6:41 PM

## 2023-02-17 NOTE — CARE COORDINATION
Case Management Assessment  Initial Evaluation    Date/Time of Evaluation: 2/17/2023 11:25 AM  Assessment Completed by: BC Bonds  Case Management Department  Phone: [phone] Fax: [phone]      If patient is discharged prior to next notation, then this note serves as note for discharge by case management. Patient Name: Karly Hull                   YOB: 2000  Diagnosis: Right hip pain [M25.551]  Chronic anemia [D64.9]  Sickle cell pain crisis ([address] [D57.00]  Acute electrocardiogram changes [R94.31]  Right-sided chest pain [R07.9]  Other acute pulmonary embolism without acute cor pulmonale (HCC) [I26.99]  Pulmonary embolism, other, unspecified chronicity, unspecified whether acute cor pulmonale present ([address] [I26.99]                   Date / Time: 2/17/2023  2:30 AM    Patient Admission Status: Inpatient   Readmission Risk (Low < 19, Mod (19-27), High > 27): Readmission Risk Score: 31.7    Current PCP: Yanelis Skinner MD  PCP verified by CM? Yes    Chart Reviewed: Yes      History Provided by: Patient  Patient Orientation: Alert and Oriented    Patient Cognition: Alert    Hospitalization in the last 30 days (Readmission):  Yes    If yes, Readmission Assessment in CM Navigator will be completed. Advance Directives:      Code Status: Full Code   Patient's Primary Decision Maker is: Legal Next of Kin    Primary Decision Maker: HiryanBerkley rivas - Parent - [phone]    Discharge Planning:    Patient lives with: Parent Type of Home: House  Primary Care Giver: Self  Patient Support Systems include: Family Members, Parent   Current Financial resources:  ([employer] and [employer])  Current community resources: None  Current services prior to admission: None            Current DME:              Type of Home Care services:  None    ADLS  Prior functional level: Independent in ADLs/IADLs  Current functional level:  Independent in ADLs/IADLs    PT AM-PAC:   /24  OT AM-PAC: /24    Family can provide assistance at DC: Yes  Would you like Case Management to discuss the discharge plan with any other family members/significant others, and if so, who? Yes  Plans to Return to Present Housing: Yes  Other Identified Issues/Barriers to RETURNING to current housing: n/a  Potential Assistance needed at discharge: N/A            Potential DME:    Patient expects to discharge to: [address] [place] [address] for transportation at discharge:      Financial    Payor: [id number] [employer]. / Plan: [address] [employer]. NAP CHOICE POS II / Product Type: *No Product type* /     Does insurance require precert for SNF: Yes    Potential assistance Purchasing Medications: No Magaliea Lips in Meds to beds is having Eliquis/Xarelto cost checked. Delfina RN aware and aware MD will need to call to M2B if pt to discharge home over the weekend)  Meds-to-Beds request:        [facility] [id number] [facility], [address]  [address] [address]  Phone: [phone] Fax: [phone]    [address]  [address]  Phone: [phone] Fax: [phone]    [facility] [address]. [facility] [phone] [facility] [phone]  [address] [facility] [address]  Phone: [phone] Fax: [phone]      Notes:    Factors facilitating achievement of predicted outcomes: Family support    Barriers to discharge: n/a    Additional Case Management Notes: Chart review completed. Met with pt at bedside. Pt drowsy and did not provide a lot of information to CM. Pt stated nothing has changed from his recently hospital stay. Pt stated he will return home and is independent with his ADL's. He stated he won't need any community services or [address]. Spoke with RN who states pt was just recently given medication and is aware of pt being drowsy.      The Plan for Transition of Care is related to the following treatment goals of Right hip pain [M25.551]  Chronic anemia [D64.9]  Sickle cell pain crisis ([address] [D57.00]  Acute electrocardiogram changes [R94.31]  Right-sided chest pain [R07.9]  Other acute pulmonary embolism without acute cor pulmonale (HCC) [I26.99]  Pulmonary embolism, other, unspecified chronicity, unspecified whether acute cor pulmonale present ([address] [I26.99]    BC Howe  Case Management Department  Phone: [phone] Fax: [phone]    Addendum at 12:05pm: Per Liza Gilford in meds to beds, pt has a $75 co-pay for either Eliquis or Xarelto

## 2023-02-17 NOTE — PROGRESS NOTES
Called to room patient awake and complaining of pain state just in penis now-pt uncovered with erection. Pain medication given and urine culture sent to lab

## 2023-02-17 NOTE — ED NOTES
Placed on oxygen at 2 liters/nc due to low sat.   Once on oxygen, sat up to 94%     Aarti Stock RN  02/17/23 [id number]

## 2023-02-17 NOTE — PROGRESS NOTES
Patient admitted to room 303  from ER. Patient oriented to room, call light, bed rails, phone, lights and bathroom. Patient instructed about the schedule of the day including: vital sign frequency, lab draws, possible tests, frequency of MD and staff rounds, daily weights, I &O's and prescribed diet. reg Telemetry box in place, patient aware of placement and reason. Bed locked, in lowest position, side rails up 2/4, call light within reach. Recliner Assessment  Patient is able to demonstrate the ability to move from a reclining position to an upright position within the recliner. 4 Eyes Skin Assessment     The patient is being assess for   Admission    I agree that 2 RN's have performed a thorough Head to Toe Skin Assessment on the patient. ALL assessment sites listed below have been assessed. Areas assessed for pressure by both nurses:   [x]   Head, Face, and Ears   [x]   Shoulders, Back, and Chest, Abdomen  [x]   Arms, Elbows, and Hands   [x]   Coccyx, Sacrum, and Ischium  [x]   Legs, Feet, and Heels        Skin Assessed Under all Medical Devices by both nurses:  O2 device tubing      yes      All skin intact              **SHARE this note so that the co-signing nurse is able to place an eSignature**    Co-signer eSignature: Electronically signed by Mar Garcia RN on 2/17/23 at 10:22 AM EST    Does the Patient have Skin Breakdown related to pressure?   No            Shaun Prevention initiated:  No   Wound Care Orders initiated:  No      [facility] nurse consulted for Pressure Injury (Stage 3,4, Unstageable, DTI, NWPT, Complex wounds)and New or Established Ostomies:  No      Primary Nurse eSignature: Electronically signed by Lloyd Varela RN on 2/17/23 at 9:39 AM EST

## 2023-02-17 NOTE — PROGRESS NOTES
RN administered sudafed for priapism that has lasted for 5 hours. Patient states that this will not help, he was willing to take. RN reported back to primary nurse. Quique Zimmerman returned page at [phone]. Ordered a dose of IV ativan, instructed for high flow NC and states he will talk to his partner. RN placed orders.

## 2023-02-17 NOTE — PROGRESS NOTES
RT Inhaler-Nebulizer Bronchodilator Protocol Note    There is a bronchodilator order in the chart from a provider indicating to follow the RT Bronchodilator Protocol and there is an Initiate RT Inhaler-Nebulizer Bronchodilator Protocol order as well (see protocol at bottom of note). CXR Findings:  XR CHEST PORTABLE    Result Date: 2/17/2023  No acute cardiopulmonary abnormality. Mild cardiomegaly. The findings from the last RT Protocol Assessment were as follows:   History Pulmonary Disease: Chronic pulmonary disease  Respiratory Pattern: Dyspnea on exertion or RR 21-25 bpm  Breath Sounds: Clear breath sounds  Cough: Strong, spontaneous, non-productive  Indication for Bronchodilator Therapy: On home bronchodilators  Bronchodilator Assessment Score: 4    Aerosolized bronchodilator medication orders have been revised according to the RT Inhaler-Nebulizer Bronchodilator Protocol below. Respiratory Therapist to perform RT Therapy Protocol Assessment initially then follow the protocol. Repeat RT Therapy Protocol Assessment PRN for score 0-3 or on second treatment, BID, and PRN for scores above 3. No Indications - adjust the frequency to every 6 hours PRN wheezing or bronchospasm, if no treatments needed after 48 hours then discontinue using Per Protocol order mode. If indication present, adjust the RT bronchodilator orders based on the Bronchodilator Assessment Score as indicated below. Use Inhaler orders unless patient has one or more of the following: on home nebulizer, not able to hold breath for 10 seconds, is not alert and oriented, cannot activate and use MDI correctly, or respiratory rate 25 breaths per minute or more, then use the equivalent nebulizer order(s) with same Frequency and PRN reasons based on the score. If a patient is on this medication at home then do not decrease Frequency below that used at home.     0-3 - enter or revise RT bronchodilator order(s) to equivalent RT Bronchodilator order with Frequency of every 4 hours PRN for wheezing or increased work of breathing using Per Protocol order mode. 4-6 - enter or revise RT Bronchodilator order(s) to two equivalent RT bronchodilator orders with one order with BID Frequency and one order with Frequency of every 4 hours PRN wheezing or increased work of breathing using Per Protocol order mode. 7-10 - enter or revise RT Bronchodilator order(s) to two equivalent RT bronchodilator orders with one order with TID Frequency and one order with Frequency of every 4 hours PRN wheezing or increased work of breathing using Per Protocol order mode. 11-13 - enter or revise RT Bronchodilator order(s) to one equivalent RT bronchodilator order with QID Frequency and an Albuterol order with Frequency of every 4 hours PRN wheezing or increased work of breathing using Per Protocol order mode. Greater than 13 - enter or revise RT Bronchodilator order(s) to one equivalent RT bronchodilator order with every 4 hours Frequency and an Albuterol order with Frequency of every 2 hours PRN wheezing or increased work of breathing using Per Protocol order mode. RT to enter RT Home Evaluation for COPD & MDI Assessment order using Per Protocol order mode.     Electronically signed by Alvarez Dominguez on 2/17/2023 at 3:55 PM

## 2023-02-17 NOTE — CONSULTS
Urology Attending Consult Note      Reason for Consultation: priapism    CC: \"My chest hurt. Now erection is killing me\"    History: 23 y/o male has sicle cell disease and recurrent priapism. He has been non-compliant and fails to f/u as outpatient. Very recently, severe left leg pain prompting visit to emergency department. He stayed in the emergency department overnight and woke up this morning with a severe painful erection. ER gave ice and plan Sudafed, now the erection has detumescence     After being discharged earlier in the week, yesterday he returned to the emergency department due to fevers and chest pain. Work-up revealed a pulmonary embolism. He was in the hospital getting a heparin drip and then this afternoon stating he had a severe firm erection. Sudafed was given as well as some ice and pain medication but his erection is not going down. In the past seen Kateryna Garcia MD.  At that time in 2022 - but he never followed up, no showed to his Oct 5th visit? Family History, Social History, Review of Systems:  Reviewed and agreed to as per chart    Vitals:  /71   Pulse 54   Temp 98.5 °F (36.9 °C) (Oral)   Resp 16   Ht 5' 9\" (1.753 m)   Wt 187 lb (84.8 kg)   SpO2 100%   BMI 27.62 kg/m²   Temp  Av °F (37.2 °C)  Min: 97.4 °F (36.3 °C)  Max: 101.6 °F (38.7 °C)    Intake/Output Summary (Last 24 hours) at 2023 1634  Last data filed at 2023 1507  Gross per 24 hour   Intake --   Output 300 ml   Net -300 ml         Physical:  Well developed, well nourished in no acute distress  Mood indicates no abnormalities. Pt doesnt appear depressed  Orientated to time and place  Neck is supple, trachea is midline  Respiratory effort is normal  Cardiovascular show no extremity swelling  Abdomen no masses or hernias are palpated, there is no tenderness.  Skin show no abnormal lesions       Male :  Penis tender, moderate rigidity  urethral meatus is normal in size and location  Scrotum appears normal and both testicles appear normal in size and location      Labs:  WBC:    Lab Results   Component Value Date/Time    WBC 11.9 02/17/2023 03:05 AM     Hemoglobin/Hematocrit:    Lab Results   Component Value Date/Time    HGB 8.3 02/17/2023 03:05 AM    HCT 22.2 02/17/2023 03:05 AM     BMP:    Lab Results   Component Value Date/Time     02/17/2023 03:05 AM    K 4.2 02/17/2023 03:05 AM     02/17/2023 03:05 AM    CO2 27 02/17/2023 03:05 AM    BUN 10 02/17/2023 03:05 AM    LABALBU 4.2 02/17/2023 03:05 AM    CREATININE 0.7 02/17/2023 03:05 AM    CALCIUM 8.8 02/17/2023 03:05 AM    GFRAA >60 08/29/2022 02:12 AM    LABGLOM >60 02/17/2023 03:05 AM     PT/INR:    Lab Results   Component Value Date/Time    PROTIME 15.5 02/17/2023 03:05 AM    INR 1.25 02/17/2023 03:05 AM     PTT:    Lab Results   Component Value Date/Time    APTT 31.2 02/17/2023 03:05 AM   [APTT    Impression/Plan:   Pulmonary embolism  Sickle Cell crisis  Recurrent priapsim     plan in the past had been, sildenafil 20mg po qd -patient has not followed up with Ralf Garcia MD to review this, cost may be an issue for the medication    Also Sudafed OTC prn for nighttime erections.  Ice packs, cold shower   He also discussed baclofen for painful sleep related erections - hold off for now    See procedure note, phenylepherine injection done at bedside to take down erection  R/b/a reviewed    F/u  1-2 weeks postdischarge, with Ralf Martin MD

## 2023-02-17 NOTE — PROCEDURES
PROCEDURE NOTE     Patient Name:  Kurtis Obrien  :  2000  Date:  23  Time:  5:22 PM      Pre-Operative Diagnosis:  priapism, penile pain  Post-Operative Diagnosis: same     Procedure Performed:    1. Manual irrigation and lavage of penis with  injection of phenylephrine to cause detumescence of penis      Surgeon:  Edgardo Pickens MD  Anesthesia: local,  lidocaine jelly to skin;  dilaudid  Specimens: None  Estimated Blood Loss: None  Complications: None     Indications:  See prog/hospital notes. Pt with severe penile pain and  priapism    Description of Procedure: The patient was kept in a supine position. Verbal consent obtained at bedside due to urgency of procedure. The penis was prepped in usual sterile fashion. Lidocaine jelly apply to skin. I then injected with dilute phenylephrine, I first aspirated small amount of blood and then slowly into penis about 1cm deep, while he was on a monitor, 2cc injected to each side at base of phallus. Dilaudid was given for relief as well by nursing prior to injection. I then applied 3 minutes of circumfrential compression to reduce edema and bleeding risk. The phallus had excellent detumescence and his pain was relieved.       Findings:   As above    Plan:  -Per consult note      Tangela Walsh MD  Office: [phone]

## 2023-02-17 NOTE — ED PROVIDER NOTES
[address] ED      CHIEF COMPLAINT  Sickle Cell Pain Crisis (States sickle cell crisisand fever that started last pm 2200)       HISTORY OF PRESENT ILLNESS  Andrew Argueta is a 20 y.o. male  who presents to the ED complaining of pain since about 10pm.  Woke up with the pain c/w his sickle cell pain and also fever 102.3F. Took APAP for the fever, then rechecked temp 1.5 hrs later and was 102.5F.  slight dyspnea. Pain right now in chest.  Has hx acute chest syndrome x1 when he was approx 5years old. Is localized in mid to right chest, is a sharp aching pain.  + hx blood clots. Was on eliquis but no longer on it. No leg pain/swelling in lower leg but having right hip pain. No vomiting. No abd pain. No vision changes. Slight headache. Follows with Dr. Philipp Nevarez for his sickle cell management. Took oxycodone tonight at approx 7pm    No other complaints, modifying factors or associated symptoms. I have reviewed the following from the nursing documentation.     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)     Sickle cell anemia (HCC)      Past Surgical History:   Procedure Laterality Date    ABSCESS DRAINAGE Left 2012    leg    SPLENECTOMY       Family History   Problem Relation Age of Onset    Other Father         sarcoidosis     Social History     Socioeconomic History    Marital status: Single     Spouse name: Not on file    Number of children: 0    Years of education: Not on file    Highest education level: Not on file   Occupational History    Not on file   Tobacco Use    Smoking status: Never     Passive exposure: Never    Smokeless tobacco: Never   Vaping Use    Vaping Use: Never used   Substance and Sexual Activity    Alcohol use: Not Currently    Drug use: Never    Sexual activity: Not Currently     Partners: Female   Other Topics Concern Not on file   Social History Narrative    Not on file     Social Determinants of Health     Financial Resource Strain: Not on file   Food Insecurity: Not on file   Transportation Needs: Not on file   Physical Activity: Not on file   Stress: Not on file   Social Connections: Not on file   Intimate Partner Violence: Not on file   Housing Stability: Not on file     Current Facility-Administered Medications   Medication Dose Route Frequency Provider Last Rate Last Admin    heparin (porcine) injection 6,800 Units  6,800 Units IntraVENous PRN Wing Scales MD        heparin (porcine) injection 3,400 Units  3,400 Units IntraVENous PRN Wing Scales MD        heparin 25,000 units in dextrose 5% 250 mL (premix) infusion  1,530 Units/hr IntraVENous Continuous Wing Scales MD 15.3 mL/hr at 02/17/23 0535 1,530 Units/hr at 02/17/23 0535     Current Outpatient Medications   Medication Sig Dispense Refill    hydroxyurea (HYDREA) 500 MG chemo capsule Take 2 capsules by mouth 2 times daily      pseudoephedrine (SUDAFED) 60 MG tablet Take 1 tablet by mouth daily as needed (priapism)      sildenafil (REVATIO) 20 MG tablet Take 1 tablet by mouth daily 30 tablet 0    folic acid (FOLVITE) 1 MG tablet Take 2 tablets by mouth daily 30 tablet 3    oxyCODONE HCl (OXY-IR) 10 MG immediate release tablet Take 10 mg by mouth every 4 hours as needed for Pain.       albuterol sulfate HFA (PROVENTIL;VENTOLIN;PROAIR) 108 (90 Base) MCG/ACT inhaler Albuterol HFA Inhaler 90 mcg/actuation  inhaled  2 puffs prn     Active      levalbuterol (XOPENEX) 0.31 MG/3ML nebulization Levalbuterol Nebulized    2 puffs prn     Active       Allergies   Allergen Reactions    Morphine Shortness Of Breath     Other reaction(s): Histamine-like Reaction  Has asthma exacerbation with morphine-histamine type reaction         PHYSICAL EXAM  BP (!) 113/45   Pulse 62   Temp 98 °F (36.7 °C)   Resp 14   Ht 5' 9\" (1.753 m)   Wt 187 lb (84.8 kg)   SpO2 97%   BMI 27.62 kg/m²    GENERAL APPEARANCE: Awake and alert. Cooperative. No acute distress. HENT: Normocephalic. Atraumatic. Mucous membranes are moist.  No drooling or stridor. NECK: Supple. EYES: PERRL. EOM's grossly intact. HEART/CHEST: RRR. No murmurs. 2+ radial pulses bilaterally. LUNGS: Respirations unlabored. CTAB. Good air exchange. Speaking comfortably in full sentences. ABDOMEN: No tenderness. Soft. Non-distended. No masses. No organomegaly. No guarding or rebound. MUSCULOSKELETAL: No extremity edema. Compartments soft. No deformity. No tenderness in the extremities. All extremities neurovascularly intact. SKIN: Warm and dry. No acute rashes. NEUROLOGICAL: Alert and oriented. CN's 2-12 intact. No gross facial drooping. No gross focal deficits. PSYCHIATRIC: Normal mood and affect. LABS  I have reviewed all labs for this visit.    Results for orders placed or performed during the hospital encounter of 02/17/23   COVID-19 & Influenza Combo    Specimen: Nasopharyngeal Swab   Result Value Ref Range    SARS-CoV-2 RNA, RT PCR NOT DETECTED NOT DETECTED    INFLUENZA A NOT DETECTED NOT DETECTED    INFLUENZA B NOT DETECTED NOT DETECTED   CBC with Auto Differential   Result Value Ref Range    WBC 11.9 (H) 4.0 - 11.0 K/uL    RBC 2.33 (L) 4.20 - 5.90 M/uL    Hemoglobin 8.3 (L) 13.5 - 17.5 g/dL    Hematocrit 22.2 (L) 40.5 - 52.5 %    MCV 95.1 80.0 - 100.0 fL    MCH 35.6 (H) 26.0 - 34.0 pg    MCHC 37.4 (H) 31.0 - 36.0 g/dL    RDW 25.7 (H) 12.4 - 15.4 %    Platelets 829 109 - 865 K/uL    MPV 8.5 5.0 - 10.5 fL    Neutrophils % 77.5 %    Lymphocytes % 13.4 %    Monocytes % 7.7 %    Eosinophils % 0.7 %    Basophils % 0.7 %    Neutrophils Absolute 9.2 (H) 1.7 - 7.7 K/uL    Lymphocytes Absolute 1.6 1.0 - 5.1 K/uL    Monocytes Absolute 0.9 0.0 - 1.3 K/uL    Eosinophils Absolute 0.1 0.0 - 0.6 K/uL    Basophils Absolute 0.1 0.0 - 0.2 K/uL   CMP w/ Reflex to MG   Result Value Ref Range    Sodium 141 136 - 145 mmol/L Potassium reflex Magnesium 4.2 3.5 - 5.1 mmol/L    Chloride 105 99 - 110 mmol/L    CO2 27 21 - 32 mmol/L    Anion Gap 9 3 - 16    Glucose 101 (H) 70 - 99 mg/dL    BUN 10 7 - 20 mg/dL    Creatinine 0.7 (L) 0.9 - 1.3 mg/dL    Est, Glom Filt Rate >60 >60    Calcium 8.8 8.3 - 10.6 mg/dL    Total Protein 6.7 6.4 - 8.2 g/dL    Albumin 4.2 3.4 - 5.0 g/dL    Albumin/Globulin Ratio 1.7 1.1 - 2.2    Total Bilirubin 8.5 (H) 0.0 - 1.0 mg/dL    Alkaline Phosphatase 70 40 - 129 U/L    ALT 18 10 - 40 U/L    AST 45 (H) 15 - 37 U/L   Troponin   Result Value Ref Range    Troponin <0.01 <0.01 ng/mL   Lactate, Sepsis   Result Value Ref Range    Lactic Acid, Sepsis 0.7 0.4 - 1.9 mmol/L   Lactate, Sepsis   Result Value Ref Range    Lactic Acid, Sepsis 0.7 0.4 - 1.9 mmol/L   APTT   Result Value Ref Range    aPTT 31.2 23.0 - 34.3 sec   Protime-INR   Result Value Ref Range    Protime 15.5 (H) 11.7 - 14.5 sec    INR 1.25 (H) 0.87 - 1.14   Troponin   Result Value Ref Range    Troponin <0.01 <0.01 ng/mL   EKG 12 Lead   Result Value Ref Range    Ventricular Rate 69 BPM    Atrial Rate 69 BPM    P-R Interval 186 ms    QRS Duration 100 ms    Q-T Interval 436 ms    QTc Calculation (Bazett) 467 ms    P Axis 60 degrees    R Axis -73 degrees    T Axis 47 degrees    Diagnosis       Normal sinus rhythmleft axisLeft anterior fascicular blockVoltage criteria for left ventricular hypertrophyNonspecific ST and T wave abnormalityWhen compared with ECG of 10-FEB-2023 15:09,Left anterior fascicular block is now PresentNonspecific T wave abnormality now evident in Anterior leadsConfirmed by ANTONIETTA Lambert MD ([id number]) on 2/17/2023 7:27:49 AM         ECG  The Ekg interpreted by me shows  normal sinus rhythm with a rate of 69  Axis is   Left axis deviation  QTc is  within an acceptable range  Intervals and Durations are unremarkable.       ST Segments: nonspecific changes  Patient with no leftward axis deviation and upsloping in the inferior leads which are new compared with previous EKG dated 2/10/2023. However, there is somewhat of a wandering baseline and although it is upslope, no definite STEMI criteria is met. This was correlated with troponin now x2 that has been obtained that were negative. RADIOLOGY  XR HIP RIGHT (2-3 VIEWS)    Result Date: 2/17/2023  EXAMINATION: TWO XRAY VIEWS OF THE RIGHT HIP 2/17/2023 3:05 am COMPARISON: 04/26/2022 right hip radiographs. HISTORY: ORDERING SYSTEM PROVIDED HISTORY: right hip pain, hx sickle cell TECHNOLOGIST PROVIDED HISTORY: Reason for exam:->right hip pain, hx sickle cell Reason for Exam: right hip pain FINDINGS: No acute fractures or dislocations in the pelvis or right hip. Bilateral femoral head contours and hip joints are intact. No significant degenerative change. Symphysis pubis is not widened. Bilateral sacroiliac joints are intact. No appreciable soft tissue abnormality. No evidence of acute osseous abnormality involving the pelvis or right hip. XR CHEST PORTABLE    Result Date: 2/17/2023  EXAMINATION: ONE XRAY VIEW OF THE CHEST 2/17/2023 3:21 am COMPARISON: Chest portable February 10, 2023. HISTORY: ORDERING SYSTEM PROVIDED HISTORY: dyspnea TECHNOLOGIST PROVIDED HISTORY: Reason for exam:->dyspnea Reason for Exam: sob FINDINGS: The heart is mildly enlarged with otherwise unremarkable configuration. The mediastinal contours are within normal limits. The lungs are well aerated. The pleural surfaces are normal and no evidence of a pleural effusion is seen. No acute bone or soft tissue abnormality is noted. No acute cardiopulmonary abnormality. Mild cardiomegaly. CT CHEST PULMONARY EMBOLISM W CONTRAST    Result Date: 2/17/2023  EXAMINATION: CTA OF THE CHEST 2/17/2023 4:27 am TECHNIQUE: CTA of the chest was performed after the administration of intravenous contrast.  Multiplanar reformatted images are provided for review. MIP images are provided for review.  Automated exposure control, iterative reconstruction, and/or weight based adjustment of the mA/kV was utilized to reduce the radiation dose to as low as reasonably achievable. COMPARISON: 08/27/2022 CT chest HISTORY: ORDERING SYSTEM PROVIDED HISTORY: Chest Pain, sickle cell disease TECHNOLOGIST PROVIDED HISTORY: Reason for exam:->Chest Pain Decision Support Exception - unselect if not a suspected or confirmed emergency medical condition->Emergency Medical Condition (MA) Reason for Exam: sob chest pain FINDINGS: Pulmonary Arteries: Small filling defects are seen within two left lower lobe segmental pulmonary arteries (series 2, images 133 and 142). Main pulmonary artery is normal in caliber. Mediastinum: No evidence of mediastinal lymphadenopathy. The heart and pericardium demonstrate no acute abnormality. There is no acute abnormality of the thoracic aorta. Lungs/pleura: Scattered linear subsegmental atelectasis. Minimal patchy ground-glass opacities of the left lung base are similar in comparison with multiple prior examinations, favored to represent chronic change given history of sickle cell disease. No focal consolidation. No evidence of pleural effusion or pneumothorax. Upper Abdomen: Limited images of the upper abdomen are unremarkable. Soft Tissues/Bones: Bilateral gynecomastia. 1.  There are two small filling defects within left lower lobe segmental pulmonary arteries concerning for pulmonary emboli. 2.  Minimal patchy ground-glass opacities of the left lung base are similar in comparison with multiple prior examinations, favored secondary to chronic change in the setting of sickle cell disease. ED COURSE/MDM  Patient presenting for evaluation of right-sided chest pain as well as right hip pain in light of sickle cell disease. He is noted to be hypoxic on room air which is new for him and he was placed on oxygen via nasal cannula to maintain his oxygen saturations above 92%. No persistent tachycardia.   Concern for possible pneumonia, acute chest syndrome, PE, pneumothorax or other possible cause. EKG showing nonspecific changes. Troponin was initially negative though and repeat troponin also negative. Will need to continue to correlate clinically but at this time, I did not feel that the EKG met STEMI criteria. However, it is certainly abnormal..   Patient CT scan of the chest under pulmonary embolism protocol did however reveal small filling defects in the left lower lungs concerning for acute pulmonary emboli. Patient will be anticoagulated with high-dose heparin. He had been given oral aspirin up initially upon arrival.  Patient has been previously on Toradol and Dilaudid for his pain management as an inpatient for acute sickle cell pain crisis. I continued him on these 2 medications as well as IV fluid bolus given. No evidence of any source of infection at this point. COVID and flu negative. He does have hip pain but no evidence of necrosis on his x-ray. At this time, I felt that inpatient admission for further management of his pulmonary emboli and acute pain crisis would be most appropriate. Hospitalist has been consulted for further management at this time. Critical Care: The total Critical Care time is 60 minutes which excludes separately billable procedures. Sepsis:  Is this patient to be included in the SEP-1 Core Measure due to severe sepsis or septic shock? No   Exclusion criteria - the patient is NOT to be included for SEP-1 Core Measure due to: Infection is not suspected       Labs and imaging reviewed and results discussed with patient. Patient was reassessed as noted above . Plan of care discussed with patient. Patient in agreement with plan. I, Dr. Navid Amezcua MD, am the primary clinician of record.         During the patient's ED course, the patient was given:  Medications   heparin (porcine) injection 6,800 Units (has no administration in time range)   heparin (porcine) injection 3,400 Units (has no administration in time range)   heparin 25,000 units in dextrose 5% 250 mL (premix) infusion (1,530 Units/hr IntraVENous New Bag 2/17/23 0535)   ketorolac (TORADOL) injection 30 mg (30 mg IntraVENous Given 2/17/23 0324)   aspirin chewable tablet 324 mg (324 mg Oral Given 2/17/23 0324)   0.9 % sodium chloride infusion (0 mL/kg/hr × 84.8 kg IntraVENous Stopped 2/17/23 0653)   HYDROmorphone (DILAUDID) injection 1 mg (1 mg IntraVENous Given 2/17/23 0529)   iopamidol (ISOVUE-370) 76 % injection 85 mL (85 mLs IntraVENous Given 2/17/23 0411)   heparin (porcine) injection 6,800 Units (6,800 Units IntraVENous Given 2/17/23 0531)        CLINICAL IMPRESSION  1. Other acute pulmonary embolism without acute cor pulmonale ([facility] [address]    2. Right-sided chest pain    3. Sickle cell pain crisis ([facility] [address]    4. Right hip pain    5. Chronic anemia    6. Acute electrocardiogram changes        Blood pressure (!) 113/45, pulse 62, temperature 98 °F (36.7 °C), resp. rate 14, height 5' 9\" (1.753 m), weight 187 lb (84.8 kg), SpO2 97 %. [address] [place] [address] was admitted in stable condition. DISCLAIMER: This chart was created using Dragon dictation software. Efforts were made by me to ensure accuracy, however some errors may be present due to limitations of this technology and occasionally words are not transcribed correctly.         Tosha Montenegro MD  02/17/23 0327

## 2023-02-17 NOTE — CONSULTS
Patient is being seen at the request of Jany Higginbotham MD  for a consultation for PE    HISTORY OF PRESENT ILLNESS:   20years old with history of sickle cell disease presented with chest pain for 2 days. Moderate. Worse with taking a deep breath. Somewhat in the middle of the chest.  Associated with some shortness of breath. No cough or hemoptysis. CTPA in ED showed left lower lobe PEs. History of VTE has been off [facility] for about a year. No smoking. No inhalers. No O2. Hypoxemic in ED requiring 2 L O2. 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 SOCIAL HISTORY:   reports that he has never smoked. He has never been exposed to tobacco smoke. He has never used smokeless tobacco.    Scheduled Meds:   hydroxyurea  1,000 mg Oral BID    folic acid  2 mg Oral Daily    sildenafil  20 mg Oral Daily    sodium chloride flush  5-40 mL IntraVENous 2 times per day     Continuous Infusions:   heparin (PORCINE) Infusion 1,530 Units/hr (02/17/23 0535)    sodium chloride      dextrose 5 % and 0.45 % NaCl 125 mL/hr at 02/17/23 0935     PRN Meds:  heparin (porcine), heparin (porcine), albuterol sulfate HFA, pseudoephedrine, sodium chloride flush, sodium chloride, ondansetron **OR** ondansetron, polyethylene glycol, acetaminophen **OR** acetaminophen, potassium chloride, magnesium sulfate, diphenhydrAMINE, HYDROmorphone **OR** HYDROmorphone, ketorolac, perflutren lipid microspheres    ALLERGIES:  Patient is allergic to morphine.     REVIEW OF SYSTEMS:  Constitutional: Negative for fever  HENT: Negative for sore throat  Eyes: Negative for redness   Respiratory: + Shortness of breath  Cardiovascular: Negative for chest pain  Gastrointestinal: Negative for vomiting, diarrhea   Genitourinary: Negative for hematuria   Musculoskeletal: + Arthralgias   Skin: Negative for rash  Neurological: Negative for syncope  Hematological: Negative for adenopathy  Psychiatric/Behavorial: Negative for anxiety    PHYSICAL EXAM:  Blood pressure (!) 106/58, pulse 69, temperature 97.4 °F (36.3 °C), temperature source Oral, resp. rate 16, height 5' 9\" (1.753 m), weight 187 lb (84.8 kg), SpO2 90 %.' on 2 L  Gen: No distress. Eyes: PERRL. No sclera icterus. No conjunctival injection. ENT: No discharge. Pharynx clear. Neck: Trachea midline. No obvious mass. Resp: Mild accessory muscle use. Few crackles. No wheezes. No rhonchi. No dullness on percussion. CV: Regular rate. Regular rhythm. No murmur or rub. No edema. GI: Non-tender. Non-distended. No hernia. Skin: Warm and dry. No nodule on exposed extremities. Lymph: No cervical LAD. No supraclavicular LAD. M/S: No cyanosis. No joint deformity. No clubbing. Neuro: Awake. Alert. Moves all four extremities. Psych: Oriented x 3. No anxiety. LABS:  CBC:   Recent Labs     02/17/23 0305   WBC 11.9*   HGB 8.3*   HCT 22.2*   MCV 95.1        BMP:   Recent Labs     02/17/23 0305      K 4.2      CO2 27   BUN 10   CREATININE 0.7*     LIVER PROFILE:   Recent Labs     02/17/23 0305   AST 45*   ALT 18   BILITOT 8.5*   ALKPHOS 70     PT/INR:   Recent Labs     02/17/23 0305   PROTIME 15.5*   INR 1.25*     APTT:   Recent Labs     02/17/23 0305   APTT 31.2     UA:No results for input(s): NITRITE, COLORU, PHUR, LABCAST, WBCUA, RBCUA, MUCUS, TRICHOMONAS, YEAST, BACTERIA, CLARITYU, SPECGRAV, LEUKOCYTESUR, UROBILINOGEN, BILIRUBINUR, BLOODU, GLUCOSEU, AMORPHOUS in the last 72 hours.     Invalid input(s): KETONESU  No results for input(s): PHART, [id number], PO2ART in the last 72 hours.    CTPA 2/17 imaging was reviewed by me and showed   1. There are two small filling defects within left lower lobe segmental   pulmonary arteries concerning for pulmonary emboli. 2.  Minimal patchy ground-glass opacities of the left lung base are similar   in comparison with multiple prior examinations, favored secondary to chronic change in the setting of sickle cell disease        ASSESSMENT:  Acute hypoxemic respiratory failure  Acute LLL PE  Fever-CT chest does not suggest pneumonia  History of DVT 2021  Sickle cell crisis-noncompliant with outpatient management    PLAN:  Heparin drip. Consider outpatient novel oral anticoagulants, factor Xa inhibitors or direct thrombin inhibitors. Patient is hemodynamically stable and therefore no indication systemic thrombolysis or catheter-based therapies at this time.     Antibiotics per internal medicine  Check cultures  Hematology following  Will need lifelong anticoagulation

## 2023-02-17 NOTE — PROGRESS NOTES
Pt asleep dad at bedside discussed plan for pt and pt blood work and plan for possible penile implant.

## 2023-02-18 VITALS
RESPIRATION RATE: 18 BRPM | HEIGHT: 69 IN | WEIGHT: 189.3 LBS | DIASTOLIC BLOOD PRESSURE: 59 MMHG | TEMPERATURE: 98.2 F | OXYGEN SATURATION: 98 % | SYSTOLIC BLOOD PRESSURE: 110 MMHG | BODY MASS INDEX: 28.04 KG/M2 | HEART RATE: 62 BPM

## 2023-02-18 LAB
A/G RATIO: 1.6 (ref 1.1–2.2)
ALBUMIN SERPL-MCNC: 3.8 G/DL (ref 3.4–5)
ALP BLD-CCNC: 62 U/L (ref 40–129)
ALT SERPL-CCNC: 20 U/L (ref 10–40)
ANION GAP SERPL CALCULATED.3IONS-SCNC: 8 MMOL/L (ref 3–16)
ANTI-XA UNFRAC HEPARIN: 0.25 IU/ML (ref 0.3–0.7)
ANTI-XA UNFRAC HEPARIN: 0.36 IU/ML (ref 0.3–0.7)
AST SERPL-CCNC: 45 U/L (ref 15–37)
BASOPHILS ABSOLUTE: 0 K/UL (ref 0–0.2)
BASOPHILS RELATIVE PERCENT: 0.5 %
BILIRUB SERPL-MCNC: 5.5 MG/DL (ref 0–1)
BLOOD CULTURE, ROUTINE: NORMAL
BUN BLDV-MCNC: 6 MG/DL (ref 7–20)
CALCIUM SERPL-MCNC: 8.4 MG/DL (ref 8.3–10.6)
CHLORIDE BLD-SCNC: 106 MMOL/L (ref 99–110)
CO2: 24 MMOL/L (ref 21–32)
CREAT SERPL-MCNC: <0.5 MG/DL (ref 0.9–1.3)
CULTURE, BLOOD 2: NORMAL
EOSINOPHILS ABSOLUTE: 0.1 K/UL (ref 0–0.6)
EOSINOPHILS RELATIVE PERCENT: 2 %
GFR SERPL CREATININE-BSD FRML MDRD: >60 ML/MIN/{1.73_M2}
GLUCOSE BLD-MCNC: 112 MG/DL (ref 70–99)
HCT VFR BLD CALC: 20.5 % (ref 40.5–52.5)
HEMOGLOBIN: 7.3 G/DL (ref 13.5–17.5)
LYMPHOCYTES ABSOLUTE: 1.4 K/UL (ref 1–5.1)
LYMPHOCYTES RELATIVE PERCENT: 23.2 %
MCH RBC QN AUTO: 35.1 PG (ref 26–34)
MCHC RBC AUTO-ENTMCNC: 35.8 G/DL (ref 31–36)
MCV RBC AUTO: 97.9 FL (ref 80–100)
MONOCYTES ABSOLUTE: 0.8 K/UL (ref 0–1.3)
MONOCYTES RELATIVE PERCENT: 14 %
NEUTROPHILS ABSOLUTE: 3.5 K/UL (ref 1.7–7.7)
NEUTROPHILS RELATIVE PERCENT: 60.3 %
PDW BLD-RTO: 24.7 % (ref 12.4–15.4)
PLATELET # BLD: 402 K/UL (ref 135–450)
PMV BLD AUTO: 8.5 FL (ref 5–10.5)
POTASSIUM REFLEX MAGNESIUM: 3.8 MMOL/L (ref 3.5–5.1)
RBC # BLD: 2.09 M/UL (ref 4.2–5.9)
SODIUM BLD-SCNC: 138 MMOL/L (ref 136–145)
TOTAL PROTEIN: 6.2 G/DL (ref 6.4–8.2)
URINE CULTURE, ROUTINE: NORMAL
WBC # BLD: 5.8 K/UL (ref 4–11)

## 2023-02-18 PROCEDURE — 6370000000 HC RX 637 (ALT 250 FOR IP): Performed by: INTERNAL MEDICINE

## 2023-02-18 PROCEDURE — 6360000002 HC RX W HCPCS: Performed by: INTERNAL MEDICINE

## 2023-02-18 PROCEDURE — 94761 N-INVAS EAR/PLS OXIMETRY MLT: CPT

## 2023-02-18 PROCEDURE — 80053 COMPREHEN METABOLIC PANEL: CPT

## 2023-02-18 PROCEDURE — 6360000002 HC RX W HCPCS: Performed by: EMERGENCY MEDICINE

## 2023-02-18 PROCEDURE — 36415 COLL VENOUS BLD VENIPUNCTURE: CPT

## 2023-02-18 PROCEDURE — 99238 HOSP IP/OBS DSCHRG MGMT 30/<: CPT | Performed by: INTERNAL MEDICINE

## 2023-02-18 PROCEDURE — 2700000000 HC OXYGEN THERAPY PER DAY

## 2023-02-18 PROCEDURE — 2580000003 HC RX 258: Performed by: INTERNAL MEDICINE

## 2023-02-18 PROCEDURE — 85025 COMPLETE CBC W/AUTO DIFF WBC: CPT

## 2023-02-18 PROCEDURE — 85520 HEPARIN ASSAY: CPT

## 2023-02-18 PROCEDURE — 99233 SBSQ HOSP IP/OBS HIGH 50: CPT | Performed by: INTERNAL MEDICINE

## 2023-02-18 PROCEDURE — 94640 AIRWAY INHALATION TREATMENT: CPT

## 2023-02-18 RX ORDER — HEPARIN SODIUM 1000 [USP'U]/ML
3400 INJECTION, SOLUTION INTRAVENOUS; SUBCUTANEOUS ONCE
Status: COMPLETED | OUTPATIENT
Start: 2023-02-18 | End: 2023-02-18

## 2023-02-18 RX ADMIN — HYDROXYUREA 1000 MG: 500 CAPSULE ORAL at 09:15

## 2023-02-18 RX ADMIN — DEXTROSE AND SODIUM CHLORIDE: 5; 450 INJECTION, SOLUTION INTRAVENOUS at 00:41

## 2023-02-18 RX ADMIN — APIXABAN 10 MG: 5 TABLET, FILM COATED ORAL at 13:59

## 2023-02-18 RX ADMIN — FOLIC ACID 2 MG: 1 TABLET ORAL at 09:15

## 2023-02-18 RX ADMIN — HYDROMORPHONE HYDROCHLORIDE 0.5 MG: 1 INJECTION, SOLUTION INTRAMUSCULAR; INTRAVENOUS; SUBCUTANEOUS at 02:06

## 2023-02-18 RX ADMIN — KETOROLAC TROMETHAMINE 30 MG: 30 INJECTION, SOLUTION INTRAMUSCULAR; INTRAVENOUS at 02:01

## 2023-02-18 RX ADMIN — HYDROMORPHONE HYDROCHLORIDE 1 MG: 1 INJECTION, SOLUTION INTRAMUSCULAR; INTRAVENOUS; SUBCUTANEOUS at 07:08

## 2023-02-18 RX ADMIN — DEXTROSE AND SODIUM CHLORIDE: 5; 450 INJECTION, SOLUTION INTRAVENOUS at 09:15

## 2023-02-18 RX ADMIN — SILDENAFIL CITRATE 20 MG: 20 TABLET ORAL at 09:15

## 2023-02-18 RX ADMIN — HYDROMORPHONE HYDROCHLORIDE 1 MG: 1 INJECTION, SOLUTION INTRAMUSCULAR; INTRAVENOUS; SUBCUTANEOUS at 10:38

## 2023-02-18 RX ADMIN — Medication 2 PUFF: at 07:11

## 2023-02-18 RX ADMIN — HEPARIN SODIUM 3400 UNITS: 1000 INJECTION INTRAVENOUS; SUBCUTANEOUS at 04:56

## 2023-02-18 ASSESSMENT — PAIN DESCRIPTION - DESCRIPTORS
DESCRIPTORS: DISCOMFORT
DESCRIPTORS: DISCOMFORT
DESCRIPTORS: ACHING;DISCOMFORT
DESCRIPTORS: SHARP

## 2023-02-18 ASSESSMENT — PAIN DESCRIPTION - PAIN TYPE: TYPE: ACUTE PAIN

## 2023-02-18 ASSESSMENT — PAIN DESCRIPTION - LOCATION
LOCATION: CHEST
LOCATION: CHEST
LOCATION: CHEST;GENERALIZED
LOCATION: GENERALIZED

## 2023-02-18 ASSESSMENT — PAIN - FUNCTIONAL ASSESSMENT: PAIN_FUNCTIONAL_ASSESSMENT: PREVENTS OR INTERFERES SOME ACTIVE ACTIVITIES AND ADLS

## 2023-02-18 ASSESSMENT — PAIN DESCRIPTION - ORIENTATION
ORIENTATION: MID
ORIENTATION: MID

## 2023-02-18 ASSESSMENT — PAIN SCALES - GENERAL
PAINLEVEL_OUTOF10: 9
PAINLEVEL_OUTOF10: 9
PAINLEVEL_OUTOF10: 8
PAINLEVEL_OUTOF10: 10
PAINLEVEL_OUTOF10: 8

## 2023-02-18 NOTE — PROGRESS NOTES
Pulmonary Progress Note    CC: PE    Subjective:   Feels about the same  On 5 L O2 however saturation of 98%        Intake/Output Summary (Last 24 hours) at 2/18/2023 0752  Last data filed at 2/18/2023 0057  Gross per 24 hour   Intake 100 ml   Output 1875 ml   Net -1775 ml       Exam:   /68   Pulse 62   Temp 97 °F (36.1 °C) (Oral)   Resp 18   Ht 5' 9\" (1.753 m)   Wt 189 lb 4.8 oz (85.9 kg)   SpO2 97%   BMI 27.95 kg/m²  on 5 L  Gen: No distress.   Eyes: PERRL. No sclera icterus. No conjunctival injection.   ENT: No discharge. Pharynx clear.   Neck: Trachea midline. No obvious mass.    Resp: Mild accessory muscle use.  Few crackles. No wheezes. No rhonchi. No dullness on percussion.  CV: Regular rate. Regular rhythm. No murmur or rub. No edema.   GI: Non-tender. Non-distended. No hernia.   Skin: Warm and dry. No nodule on exposed extremities.   Lymph: No cervical LAD. No supraclavicular LAD.   M/S: No cyanosis. No joint deformity. No clubbing.   Neuro: Awake. Alert. Moves all four extremities.   Psych: Oriented x 3. No anxiety    Scheduled Meds:   hydroxyurea  1,000 mg Oral BID    folic acid  2 mg Oral Daily    sildenafil  20 mg Oral Daily    sodium chloride flush  5-40 mL IntraVENous 2 times per day    albuterol sulfate HFA  2 puff Inhalation BID     Continuous Infusions:   heparin (PORCINE) Infusion 1,870 Units/hr (02/18/23 0457)    sodium chloride      dextrose 5 % and 0.45 % NaCl 125 mL/hr at 02/18/23 0041     PRN Meds:  heparin (porcine), heparin (porcine), pseudoephedrine, sodium chloride flush, sodium chloride, ondansetron **OR** ondansetron, polyethylene glycol, acetaminophen **OR** acetaminophen, potassium chloride, magnesium sulfate, diphenhydrAMINE, HYDROmorphone **OR** HYDROmorphone, ketorolac, perflutren lipid microspheres, albuterol sulfate HFA    Labs:  CBC:   Recent Labs     02/17/23  0305 02/18/23  0228   WBC 11.9* 5.8   HGB 8.3* 7.3*   HCT 22.2* 20.5*   MCV 95.1 97.9    402  BMP:   Recent Labs     02/17/23 0305 02/18/23 0228    138   K 4.2 3.8    106   CO2 27 24   BUN 10 6*   CREATININE 0.7* <0.5*     LIVER PROFILE:   Recent Labs     02/17/23 0305 02/18/23 0228   AST 45* 45*   ALT 18 20   BILITOT 8.5* 5.5*   ALKPHOS 70 62     PT/INR:   Recent Labs     02/17/23 0305   PROTIME 15.5*   INR 1.25*     APTT:   Recent Labs     02/17/23 0305   APTT 31.2     UA:No results for input(s): NITRITE, COLORU, PHUR, LABCAST, WBCUA, RBCUA, MUCUS, TRICHOMONAS, YEAST, BACTERIA, CLARITYU, SPECGRAV, LEUKOCYTESUR, UROBILINOGEN, BILIRUBINUR, BLOODU, GLUCOSEU, AMORPHOUS in the last 72 hours. Invalid input(s): KETONESU  No results for input(s): PHART, [id number], PO2ART in the last 72 hours. Cultures:   2/17 BC NGTD  2/17 UC  2/17 COVID-19 not detected    Films:      CTPA 2/17 imaging was reviewed by me and showed   1. There are two small filling defects within left lower lobe segmental   pulmonary arteries concerning for pulmonary emboli. 2.  Minimal patchy ground-glass opacities of the left lung base are similar   in comparison with multiple prior examinations, favored secondary to chronic change in the setting of sickle cell disease           ASSESSMENT:  Acute hypoxemic respiratory failure  Acute LLL PE  Fever-CT chest does not suggest pneumonia  History of DVT 2021  Sickle cell crisis-noncompliant with outpatient management  Recurrent priapism     PLAN:  Supplemental oxygen to maintain SaO2 >92%; wean as tolerated  Heparin drip-could be switched to [address]  Patient is hemodynamically stable and therefore no indication systemic thrombolysis or catheter-based therapies at this time.     Antibiotics per internal medicine  Urology and hematology following  Will need lifelong anticoagulation   Discussed with internal medicine

## 2023-02-18 NOTE — CARE COORDINATION
DISCHARGE ORDER  Date/Time 2023 2:51 PM  Completed by: Perlita Henderson RN, Case Management    Patient Name: [facility]      : 2000  Admitting Diagnosis: Right hip pain [M25.551]  Chronic anemia [D64.9]  Sickle cell pain crisis ([facility] [D57.00]  Acute electrocardiogram changes [R94.31]  Right-sided chest pain [R07.9]  Other acute pulmonary embolism without acute cor pulmonale ([facility] [address] [I26.99]  Pulmonary embolism, other, unspecified chronicity, unspecified whether acute cor pulmonale present ([facility] [I26.99]      Admit order Date and Status:23 inpt  (verify MD's last order for status of admission)      Noted discharge order. If applicable PT/OT recommendation at Discharge: N/A  DME recommendation by PT/OT:N/A  Confirmed discharge plan  : Yes  with whom patient  If pt confirmed DC plan does family need to be contacted by CM No   Discharge Plan: Order for dc noted. Spoke with pt who cont plan to return home with parents. [facility] has 30 day free card for Eliquis and can afford the 75$ co pay. Denies other needs. Chart reviewed and no other needs identified. Pr nsg pt O2 sat is 98% with activity    Date of Last IMM Given: N/A    Reviewed chart. Role of discharge planner explained and patient verbalized understanding. Discharge order is noted. Has Home O2 in place on admit:  No  Informed of need to bring portable home O2 tank on day of discharge for nursing to connect prior to leaving:   Not Indicated  Verbalized agreement/Understanding:   Not Indicated  Pt is being d/c'd to home today. Pt's O2 sats are 98% on RA with activity. Discharge timeout done with nsg, CMand pt. All discharge needs and concerns addressed.

## 2023-02-18 NOTE — PROGRESS NOTES
02/17/23 1900   RT Protocol   History Pulmonary Disease 2   Respiratory pattern 0   Breath sounds 2   Cough 0   Bronchodilator Assessment Score 4   RT Inhaler-Nebulizer Bronchodilator Protocol Note    There is a bronchodilator order in the chart from a provider indicating to follow the RT Bronchodilator Protocol and there is an Initiate RT Inhaler-Nebulizer Bronchodilator Protocol order as well (see protocol at bottom of note). CXR Findings:  XR CHEST PORTABLE    Result Date: 2/17/2023  No acute cardiopulmonary abnormality. Mild cardiomegaly. The findings from the last RT Protocol Assessment were as follows:   History Pulmonary Disease: Chronic pulmonary disease  Respiratory Pattern: Regular pattern and RR 12-20 bpm  Breath Sounds: Slightly diminished and/or crackles  Cough: Strong, spontaneous, non-productive  Indication for Bronchodilator Therapy: On home bronchodilators  Bronchodilator Assessment Score: 4    Aerosolized bronchodilator medication orders have been revised according to the RT Inhaler-Nebulizer Bronchodilator Protocol below. Respiratory Therapist to perform RT Therapy Protocol Assessment initially then follow the protocol. Repeat RT Therapy Protocol Assessment PRN for score 0-3 or on second treatment, BID, and PRN for scores above 3. No Indications - adjust the frequency to every 6 hours PRN wheezing or bronchospasm, if no treatments needed after 48 hours then discontinue using Per Protocol order mode. If indication present, adjust the RT bronchodilator orders based on the Bronchodilator Assessment Score as indicated below. Use Inhaler orders unless patient has one or more of the following: on home nebulizer, not able to hold breath for 10 seconds, is not alert and oriented, cannot activate and use MDI correctly, or respiratory rate 25 breaths per minute or more, then use the equivalent nebulizer order(s) with same Frequency and PRN reasons based on the score.   If a patient is on this medication at home then do not decrease Frequency below that used at home. 0-3 - enter or revise RT bronchodilator order(s) to equivalent RT Bronchodilator order with Frequency of every 4 hours PRN for wheezing or increased work of breathing using Per Protocol order mode. 4-6 - enter or revise RT Bronchodilator order(s) to two equivalent RT bronchodilator orders with one order with BID Frequency and one order with Frequency of every 4 hours PRN wheezing or increased work of breathing using Per Protocol order mode. 7-10 - enter or revise RT Bronchodilator order(s) to two equivalent RT bronchodilator orders with one order with TID Frequency and one order with Frequency of every 4 hours PRN wheezing or increased work of breathing using Per Protocol order mode. 11-13 - enter or revise RT Bronchodilator order(s) to one equivalent RT bronchodilator order with QID Frequency and an Albuterol order with Frequency of every 4 hours PRN wheezing or increased work of breathing using Per Protocol order mode. Greater than 13 - enter or revise RT Bronchodilator order(s) to one equivalent RT bronchodilator order with every 4 hours Frequency and an Albuterol order with Frequency of every 2 hours PRN wheezing or increased work of breathing using Per Protocol order mode.        Electronically signed by Maurice Link RCP on 2/17/2023 at 7:08 PM

## 2023-02-18 NOTE — PLAN OF CARE
Problem: Pain  Goal: Verbalizes/displays adequate comfort level or baseline comfort level  2/17/2023 2339 by Rik Evans RN  Outcome: Progressing  2/17/2023 0951 by Anna Marie Mauricio RN  Outcome: Progressing

## 2023-02-18 NOTE — FLOWSHEET NOTE
02/17/23 2013   Vital Signs   Temp 97.4 °F (36.3 °C)   Temp Source Oral   Heart Rate 84   Heart Rate Source Monitor   Resp 18   /74   MAP (Calculated) 88   BP Location Right upper arm   BP Method Automatic   Patient Position Semi fowlers   Level of Consciousness 0   MEWS Score 1   Oxygen Therapy   SpO2 98 %   O2 Device High flow nasal cannula   O2 Flow Rate (L/min) 6 L/min     Shift assessment completed see flow sheet. Patient in bed alert responding to voice. Patient on 6L O2, showing no signs of distress. Evening medications given per order. requesting prn pain meds. RR 20 currently. Heparin bolus given. New bag hung. No other needs at this time. Call light in reach.

## 2023-02-18 NOTE — DISCHARGE INSTR - COC
Continuity of Care Form    Patient Name: April Gutierrez   :  2000  MRN:  [id number]    Admit date:  2023  Discharge date:  ***    Code Status Order: Full Code   Advance Directives:     Admitting Physician:  Doug Reyna MD  PCP: Tuan Pulido MD    Discharging Nurse: [facility] Unit/Room#: /8229-69  Discharging Unit Phone Number: ***    Emergency Contact:   Extended Emergency Contact Information  Primary Emergency Contact: [address] [place] [address] Street Phone: [phone]  Relation: Parent   needed?  No  Secondary Emergency Contact: Sukhwinder Javed  [place] Phone: [phone]  Relation: Parent    Past Surgical History:  Past Surgical History:   Procedure Laterality Date    ABSCESS DRAINAGE Left 2012    leg    SPLENECTOMY         Immunization History:   Immunization History   Administered Date(s) Administered    DTaP, 5 Pertussis Antigens (Daptacel) 1999, 2000, 2001, 10/05/2004, 10/28/2011    Hepatitis A 04/15/2011, 10/28/2011    Influenza Vaccine, unspecified formulation 2012, 2017, 2019    MMR 2000, 10/05/2004    Meningococcal B, Recombinant Mona Andreina) 2018, 2018, 2019    Meningococcal MCV4P (Menactra) 04/15/2011, 2016    Pneumococcal Conjugate 13-valent ([id number]) 04/15/2011    Pneumococcal Polysaccharide (Elddgplyh88) 2011    Tdap (Boostrix, Adacel) 10/28/2011, 2013    Varicella (Varivax) 10/05/2004, 04/15/2011       Active Problems:  Patient Active Problem List   Diagnosis Code    Sickle cell pain crisis ([facility] [address] [address] D57.00    Asthma J45.909    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 ([facility]) D57.1, N48.32    Sepsis ([facility] A41.9    Opiate overdose ([facility]) T40.601A    Opiate antagonist poisoning, accidental or unintentional, initial encounter T50.7X1A    Leukocytosis D72.829    Hypoxia R09.02    Chronic anemia D64.9    Other chest pain R07.89    Pneumonia due to infectious organism J18.9    Fever R50.9 Chronic prescription opiate use Z79.891    Right leg pain M79.604    Dental infection K04.7    Sickle cell crisis ([facility] [address] D57.00    History of DVT in adulthood Z86.718    Overweight (BMI 25.0-29. 9) E66.3    Chest pain R07.9    Acute electrocardiogram changes R94.31    Acute chest syndrome due to sickle-cell disease ([facility]) D57.01    Priapism N48.30    Intractable pain R52    Gastroesophageal reflux disease without esophagitis K21.9    Non-compliance Z91.199    Sickle cell anemia ([facility]) D57.1    Pulmonary embolism, other, unspecified chronicity, unspecified whether acute cor pulmonale present ([facility]) I26.99    Acute hypoxemic respiratory failure ([facility]) J96.01    Right-sided chest pain R07.9    Pulmonary embolus ([facility]) I26.99       Isolation/Infection:   Isolation            No Isolation          Patient Infection Status       Infection Onset Added Last Indicated Last Indicated By Review Planned Expiration Resolved Resolved By    None active    Resolved    COVID-19 (Rule Out) 02/17/23 02/17/23 02/17/23 COVID-19 & Influenza Combo (Ordered)   02/17/23 Rule-Out Test Resulted    COVID-19 (Rule Out) 02/10/23 02/10/23 02/10/23 COVID-19 & Influenza Combo (Ordered)   02/10/23 Rule-Out Test Resulted    COVID-19 (Rule Out) 08/27/22 08/27/22 08/27/22 COVID-19, Rapid (Ordered)   08/27/22 Rule-Out Test Resulted    COVID-19 (Rule Out) 01/25/22 01/25/22 01/25/22 COVID-19, Rapid (Ordered)   01/25/22 Rule-Out Test Resulted    COVID-19 (Rule Out) 01/09/22 01/09/22 01/09/22 COVID-19, Rapid (Ordered)   01/09/22 Rule-Out Test Resulted    COVID-19 (Rule Out) 07/28/21 07/28/21 07/28/21 COVID-19, Rapid (Ordered)   07/28/21 Rule-Out Test Resulted    COVID-19 (Rule Out) 01/10/21 01/10/21 01/10/21 COVID-19 (Ordered)   01/11/21 Rule-Out Test Resulted    COVID-19 (Rule Out) 01/05/21 01/05/21 01/05/21 COVID-19 (Ordered)   01/05/21 Rule-Out Test Resulted    COVID-19 (Rule Out) 12/05/20 12/05/20 12/05/20 COVID-19 (Ordered)   12/06/20 Rule-Out Test Resulted COVID-19 (Rule Out) 20 COVID-19 (Ordered)   20 Rule-Out Test Resulted    COVID-19 (Rule Out) 20 COVID-19 (Ordered)   20 Rule-Out Test Resulted            Nurse Assessment:  Last Vital Signs: BP (!) 110/59   Pulse 62   Temp 98.2 °F (36.8 °C) (Oral)   Resp 18   Ht 5' 9\" (1.753 m)   Wt 189 lb 4.8 oz (85.9 kg)   SpO2 98%   BMI 27.95 kg/m²     Last documented pain score (0-10 scale): Pain Level: 10  Last Weight:   Wt Readings from Last 1 Encounters:   23 189 lb 4.8 oz (85.9 kg)     Mental Status:  {IP PT MENTAL STATUS:20631}    IV Access:  { DANIS IV ACCESS:777214448}    Nursing Mobility/ADLs:  Walking   {CHP DME RUWS:989828632}  Transfer  {CHP DME RPSC:530073075}  Bathing  {CHP DME VWYD:271239989}  Dressing  {CHP DME KNW}  Toileting  {CHP DME EMUX:941891434}  Feeding  {P DME TETT:948724924}  Med Admin  {P DME QTTI:054555860}  Med Delivery   { DANIS MED Delivery:197870082}    Wound Care Documentation and Therapy:        Elimination:  Continence: Bowel: {YES / XR:25738}  Bladder: {YES / AD:89588}  Urinary Catheter: {Urinary Catheter:706846244}   Colostomy/Ileostomy/Ileal Conduit: {YES / QU:68200}       Date of Last BM: ***    Intake/Output Summary (Last 24 hours) at 2023 1350  Last data filed at 2023 1310  Gross per 24 hour   Intake 811 ml   Output 1875 ml   Net -1064 ml     I/O last 3 completed shifts:   In: 100 [P.O.:100]  Out: 1875 [Urine:1875]    Safety Concerns:     [address] Safety Concerns:801855510}    Impairments/Disabilities:      [address] Impairments/Disabilities:117296576}    Nutrition Therapy:  Current Nutrition Therapy:   [address] Diet List:774977492}    Routes of Feeding: {CHP DME Other Feedings:299815100}  Liquids: {Slp liquid thickness:13885}  Daily Fluid Restriction: {CHP DME Yes amt example:683969152}  Last Modified Barium Swallow with Video (Video Swallowing Test): {Done Not Done CQPI:225089033}    Treatments at the Time of Hospital Discharge:   Respiratory Treatments: ***  Oxygen Therapy:  {Therapy; copd oxygen:91714}  Ventilator:    {[facility] Vent URAQ:029239830}    Rehab Therapies: {THERAPEUTIC INTERVENTION:6586272177}  Weight Bearing Status/Restrictions: {[facility] Weight Bearin}  Other Medical Equipment (for information only, NOT a DME order):  {EQUIPMENT:422433085}  Other Treatments: ***    Patient's personal belongings (please select all that are sent with patient):  {[facility] DME Belongings:590261975}    RN SIGNATURE:  {Esignature:812216232}    CASE MANAGEMENT/SOCIAL WORK SECTION    Inpatient Status Date: ***    Readmission Risk Assessment Score:  Readmission Risk              Risk of Unplanned Readmission:  35           Discharging to Facility/ Agency   Name:   Address:  Phone:  Fax:    Dialysis Facility (if applicable)   Name:  Address:  Dialysis Schedule:  Phone:  Fax:    / signature: {Esignature:005447673}    PHYSICIAN SECTION    Prognosis: {Prognosis:6263628849}    Condition at Discharge: [address] [place] [address] Patient Condition:958166951}    Rehab Potential (if transferring to Rehab): {Prognosis:1883606791}    Recommended Labs or Other Treatments After Discharge: ***    Physician Certification: I certify the above information and transfer of Burnie Fails  is necessary for the continuing treatment of the diagnosis listed and that he requires {Admit to Appropriate Level of Care:06930} for {GREATER/LESS:908490038} 30 days.      Update Admission H&P: {CHP DME Changes in ABIXY:504151011}    PHYSICIAN SIGNATURE:  {Esignature:137585618}

## 2023-02-18 NOTE — PROGRESS NOTES
Hand off report given to  Antonio Sexton RN. Patient is stable showing no signs of distress and has no current needs at this time. Call light is in reach and bed is in lowest position. Care is transferred at this time.

## 2023-02-18 NOTE — PROGRESS NOTES
ONCOLOGY HEMATOLOGY CARE PROGRESS NOTE      SUBJECTIVE:  Javier states she feels better today. No further evidence of priapism. ROS:     Constitutional:  No weight loss, No fever, No chills, No night sweats. Energy level good.   Eyes:  No impairment or change in vision  ENT / Mouth:  No pain, abnormal ulceration, bleeding, nasal drip or change in voice or hearing  Cardiovascular:  No chest pain, palpitations, new edema, or calf discomfort  Respiratory:  No pain, hemoptysis, change to breathing  Breast:  No pain, discharge, change in appearance or texture  Gastrointestinal:  No pain, cramping, jaundice, change to eating and bowel habits  Urinary:  No pain, bleeding or change in continence  Genitalia: No pain, bleeding or discharge  Musculoskeletal:  No redness, pain, edema or weakness  Skin:  No pruritus, rash, change to nodules or lesions  Neurologic:  No discomfort, change in mental status, speech, sensory or motor activity  Psychiatric:  No change in concentration or change to affect or mood  Endocrine:  No hot flashes, increased thirst, or change to urine production  Hematologic: No petechiae, ecchymosis or bleeding  Lymphatic:  No lymphadenopathy or lymphedema  Allergy / Immunologic:  No eczema, hives, frequent or recurrent infections    OBJECTIVE        Physical    VITALS:  Patient Vitals for the past 24 hrs:   BP Temp Temp src Pulse Resp SpO2 Weight   02/18/23 1108 -- -- -- -- 18 -- --   02/18/23 1030 (!) 110/59 -- -- 62 -- -- --   02/18/23 0900 98/65 98.2 °F (36.8 °C) Oral 60 18 98 % --   02/18/23 0738 -- -- -- -- 18 -- --   02/18/23 0712 -- -- -- -- -- 97 % --   02/18/23 0708 -- -- -- -- 18 -- --   02/18/23 0700 111/68 -- -- 62 -- -- --   02/18/23 0500 (!) 100/58 -- -- -- -- -- --   02/18/23 0445 (!) 92/36 -- -- -- -- -- --   02/18/23 0045 105/66 97 °F (36.1 °C) Oral 69 20 100 % 189 lb 4.8 oz (85.9 kg)   02/17/23 2056 -- -- -- 88 -- -- --   02/17/23 2013 115/74 97.4 °F (36.3 °C) Oral 84 18 98 % --   02/17/23 1720 -- -- -- 63 -- -- --   02/17/23 1715 -- -- -- (!) 41 -- 100 % --   02/17/23 1659 139/70 -- -- 60 -- -- --   02/17/23 1602 -- -- -- 54 16 100 % --   02/17/23 1400 124/71 98.5 °F (36.9 °C) Oral 78 16 100 % --       24HR INTAKE/OUTPUT:    Intake/Output Summary (Last 24 hours) at 2/18/2023 1345  Last data filed at 2/18/2023 1310  Gross per 24 hour   Intake 811 ml   Output 1875 ml   Net -1064 ml       CONSTITUTIONAL: awake, alert, cooperative, no apparent distress   EYES: pupils equal, round and reactive to light, sclera clear and conjunctiva normal  ENT: Normocephalic, without obvious abnormality, atraumatic  NECK: supple, symmetrical, no jugular venous distension and no carotid bruits   HEMATOLOGIC/LYMPHATIC: no cervical, supraclavicular or axillary lymphadenopathy   LUNGS: no increased work of breathing and clear to auscultation   CARDIOVASCULAR: regular rate and rhythm, normal S1 and S2, no murmur noted  ABDOMEN: normal bowel sounds x 4, soft, non-distended, non-tender, no masses palpated, no hepatosplenomgaly   MUSCULOSKELETAL: full range of motion noted, tone is normal  NEUROLOGIC: awake, alert, oriented to name, place and time. Motor skills grossly intact. SKIN: Normal skin color, texture, turgor and no jaundice.  appears intact   EXTREMITIES: no LE edema     DATA:  CBC:    Recent Labs     02/18/23  0228 02/17/23  0305 02/14/23  0738 02/14/23  0034 02/13/23  0448   WBC 5.8 11.9* 15.0*  --  12.7*   NEUTROABS 3.5 9.2* 9.2*  --  8.0*   LYMPHOPCT 23.2 13.4 26.0  --  21.4   RBC 2.09* 2.33* 2.46*  --  1.98*   HGB 7.3* 8.3* 8.3* 9.5* 7.0*   HCT 20.5* 22.2* 23.3* 25.8* 19.6*   MCV 97.9 95.1 94.8  --  99.0   MCH 35.1* 35.6* 33.7  --  35.4*   MCHC 35.8 37.4* 35.5  --  35.7   RDW 24.7* 25.7* 22.9*  --  21.7*    415 384  --  304       PT/INR:    Recent Labs     02/18/23  0228 02/17/23  0305 02/13/23  0448 02/11/23  0423 02/09/23  1853   PROT 6.2* 6.7 6.3* 6.7 7.8   INR --  1.25*  --   --   --      PTT:    Recent Labs     02/17/23  0305   APTT 31.2       CMP:    Recent Labs     02/18/23  0228 02/17/23  0305 02/13/23  0448 02/12/23  0442 02/11/23  0423 02/09/23  1853    141 137 138 135* 140   K 3.8 4.2 3.7 4.2 4.1 5.0    105 105 107 105 106   CO2 24 27 21 21 20* 23   GLUCOSE 112* 101* 97 98 130* 111*   BUN 6* 10 7 10 8 11   CREATININE <0.5* 0.7* 0.6* 0.6* <0.5* <0.5*   LABGLOM >60 >60 >60 >60 >60 >60   CALCIUM 8.4 8.8 9.0 9.4 9.3 9.4   PROT 6.2* 6.7 6.3*  --  6.7 7.8   LABALBU 3.8 4.2 4.0  --  4.0 4.6   AGRATIO 1.6 1.7 1.7  --   --  1.4   BILITOT 5.5* 8.5* 4.9*  --  4.5* 5.3*   ALKPHOS 62 70 67  --  70 79   ALT 20 18 12  --  13 18   AST 45* 45* 30  --  29 67*       Lab Results   Component Value Date    CALCIUM 8.4 02/18/2023    PHOS 4.5 10/20/2021       LDH:No results for input(s): LDH in the last 720 hours. Radiology Review:  CT CHEST PULMONARY EMBOLISM W CONTRAST  Narrative: EXAMINATION:  CTA OF THE CHEST 2/17/2023 4:27 am    TECHNIQUE:  CTA of the chest was performed after the administration of intravenous  contrast.  Multiplanar reformatted images are provided for review. MIP  images are provided for review. Automated exposure control, iterative  reconstruction, and/or weight based adjustment of the mA/kV was utilized to  reduce the radiation dose to as low as reasonably achievable. COMPARISON:  08/27/2022 CT chest    HISTORY:  ORDERING SYSTEM PROVIDED HISTORY: Chest Pain, sickle cell disease  TECHNOLOGIST PROVIDED HISTORY:  Reason for exam:->Chest Pain  Decision Support Exception - unselect if not a suspected or confirmed  emergency medical condition->Emergency Medical Condition (MA)  Reason for Exam: sob chest pain    FINDINGS:  Pulmonary Arteries: Small filling defects are seen within two left lower lobe  segmental pulmonary arteries (series 2, images 133 and 142). Main pulmonary  artery is normal in caliber.     Mediastinum: No evidence of mediastinal lymphadenopathy. The heart and  pericardium demonstrate no acute abnormality. There is no acute abnormality  of the thoracic aorta. Lungs/pleura: Scattered linear subsegmental atelectasis. Minimal patchy  ground-glass opacities of the left lung base are similar in comparison with  multiple prior examinations, favored to represent chronic change given  history of sickle cell disease. No focal consolidation. No evidence of  pleural effusion or pneumothorax. Upper Abdomen: Limited images of the upper abdomen are unremarkable. Soft Tissues/Bones: Bilateral gynecomastia. Impression: 1. There are two small filling defects within left lower lobe segmental  pulmonary arteries concerning for pulmonary emboli. 2.  Minimal patchy ground-glass opacities of the left lung base are similar  in comparison with multiple prior examinations, favored secondary to chronic  change in the setting of sickle cell disease. XR HIP RIGHT (2-3 VIEWS)  Narrative: EXAMINATION:  TWO XRAY VIEWS OF THE RIGHT HIP    2/17/2023 3:05 am    COMPARISON:  04/26/2022 right hip radiographs. HISTORY:  ORDERING SYSTEM PROVIDED HISTORY: right hip pain, hx sickle cell  TECHNOLOGIST PROVIDED HISTORY:  Reason for exam:->right hip pain, hx sickle cell  Reason for Exam: right hip pain    FINDINGS:  No acute fractures or dislocations in the pelvis or right hip. Bilateral  femoral head contours and hip joints are intact. No significant degenerative  change. Symphysis pubis is not widened. Bilateral sacroiliac joints are  intact. No appreciable soft tissue abnormality. Impression: No evidence of acute osseous abnormality involving the pelvis or right hip. XR CHEST PORTABLE  Narrative: EXAMINATION:  ONE XRAY VIEW OF THE CHEST    2/17/2023 3:21 am    COMPARISON:  Chest portable February 10, 2023.     HISTORY:  ORDERING SYSTEM PROVIDED HISTORY: dyspnea  TECHNOLOGIST PROVIDED HISTORY:  Reason for exam:->dyspnea  Reason for Exam: sob    FINDINGS:  The heart is mildly enlarged with otherwise unremarkable configuration. The  mediastinal contours are within normal limits. The lungs are well aerated. The pleural surfaces are normal and no evidence  of a pleural effusion is seen. No acute bone or soft tissue abnormality is noted. Impression: No acute cardiopulmonary abnormality. Mild cardiomegaly. Problem List  Patient Active Problem List   Diagnosis    Sickle cell pain crisis ([facility]    Asthma    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 ([facility]    Sepsis ([facility]    Opiate overdose (HCC)    Opiate antagonist poisoning, accidental or unintentional, initial encounter    Leukocytosis    Hypoxia    Chronic anemia    Other chest pain    Pneumonia due to infectious organism    Fever    Chronic prescription opiate use    Right leg pain    Dental infection    Sickle cell crisis ([facility]    History of DVT in adulthood    Overweight (BMI 25.0-29. 9)    Chest pain    Acute electrocardiogram changes    Acute chest syndrome due to sickle-cell disease (HCC)    Priapism    Intractable pain    Gastroesophageal reflux disease without esophagitis    Non-compliance    Sickle cell anemia (HCC)    Pulmonary embolism, other, unspecified chronicity, unspecified whether acute cor pulmonale present (HCC)    Acute hypoxemic respiratory failure (HCC)    Right-sided chest pain    Pulmonary embolus (HCC)       ASSESSMENT AND PLAN:    Sickle cell crisis:  -His mother asked me why he was not on Hydrea for sickle cell  -I informed her in front of the patient that he was supposed to be taking it, but has been noncompliant  -We do need to watch his narcotic use, because he has a tendency to take more than he really needs. I have been trying to get him to see a pain specialist, but has been fired from 1 and I cannot get others to see him.  -On the other hand, he is extremely polite and nice to talk to.     Priapism:  -This has been a recurring problem  -This seems to have resolved    Pulmonary embolism  -Seen on CTPA 2/17/2023  -Currently on a heparin drip  -He is going to be discharged on a NOAC  -He will follow-up in the office          ONCOLOGIC DISPOSITION:    -Medically stable and okay to discharge from the hematology oncology standpoint    Dov Erickson MD  Please contact through [address]

## 2023-02-18 NOTE — SIGNIFICANT EVENT
Patient was prepped and writer had medications prepared for  to perform bedside procedure as patient's priapism has not resolved. Patient states that it is still very painful. Initial BP was 137/70 HR 61. DR. Jen David requested that he was able to see HR during procedure for adverse reactions that may occur. MD injected on the R side of the shaft first, HR dropped to 48. Patient asymptomatic. Requesting more pain medication before injecting on the left. MD provided verbal order of an addition 0.5mg of dilaudid. RN provided quickly. Patient was provided with this VSS. Patient was alert and talking during this time. L side of the shaft was injected. Patient alert and oriented. HR did drop into the 40s. MD states to monitor closely as HR should improved. Patients HR dropped as low as 38 but recovered into the 60s within 15 minutes of procedure ending. Patient was advised to remain in bed and alarm was placed due to the amount of narcotics and bradycardia he was experiencing. Patient verbalized understanding. Primary RN updated. Priapism was resolved.

## 2023-02-18 NOTE — PROGRESS NOTES
Pharmacy - RE:  High-dose Heparin drip  Current rate = 17 ml/h  (1700 units/h)  Anti-Xa drawn @ 0228 = 0.25 IU/ml  Goal Anti-Xa = 0.3 - 0.7 IU/ml  Per protocol, bolus with 3400, increase rate to 1870 units/hr, and obtain another Anti-Xa 2/18 @ 1100.     Tesfaye Jalloh PharmD, [facility], 2/18/2023 4:23 AM

## 2023-02-18 NOTE — DISCHARGE SUMMARY
Name:  Wendy Linares  Room:  /6398-64  MRN:    [id number]    Discharge Summary      This discharge summary is in conjunction with a complete physical exam done on the day of discharge. Discharging Physician: Jorge Robison MD      Admit: 2/17/2023  Discharge:  2/18/2023     Diagnoses this Admission    Principal Problem:    Pulmonary embolism, other, unspecified chronicity, unspecified whether acute cor pulmonale present [facility])  Active Problems:    Acute electrocardiogram changes    Acute hypoxemic respiratory failure (HCC)    Right-sided chest pain    Pulmonary embolus (HCC)    Chronic anemia  Resolved Problems:    * No resolved hospital problems. *        Procedures (Please Review Full Report for Details)  none    Consults    IP CONSULT TO HOSPITALIST  IP CONSULT TO ONCOLOGY  IP CONSULT TO PULMONOLOGY  IP CONSULT TO UROLOGY      HPI:    The patient is a 20 y.o. male with PMH of asthma, cardiomegaly, DVT, sickle cell disease, priapism due to sickle cell disease who presents to [facility] with sickle cell pain. History obtained from the patient and review of EMR. Patient was just recently admitted with sickle cell crisis. His presentation at that time was left knee pain. Discharged on 2/14. Patient is supposed to be on Hydrea and sildenafil. Not compliant. He was resumed on these medications and prescribed the same on discharge. He was given IV pain medications and IV fluids and oxycodone while here in the hospital.  He has a management plan in place and hence no pain medications given on discharge. Patient presents back with complaints of chest pain and shortness of breath. He states has been going on for 2 days. Describes midsternal chest pain radiating to the back, associated with shortness of breath. He is unable to describe the pain further or just tell me what the intensity is . Chest pain and shortness of breath was associated with fevers.   Chest pain does not radiate anywhere. Denies any knee pain now, but does complain of right hip pain. Geraldine Desai CTPA imaging in the ED showed acute pulmonary embolism. Patient has a previous history of DVT. Geraldine Desai Patient now started on anticoagulation and admitted. Heme-onc and pulmonary consulted    Physical Exam at Discharge:  BP (!) 110/59   Pulse 62   Temp 98.2 °F (36.8 °C) (Oral)   Resp 18   Ht 5' 9\" (1.753 m)   Wt 189 lb 4.8 oz (85.9 kg)   SpO2 98%   BMI 27.95 kg/m²     Gen: 79-year-old -American male . He appears very sleepy and lethargic he has been medicated with pain medications . does awaken easily and respond to me . Awake and well-oriented , in no distress. Eyes: PERRL. No sclera icterus. No conjunctival injection. ENT: No discharge. Pharynx clear. Neck: No JVD. No Carotid Bruit. Trachea midline. Resp: No accessory muscle use. No crackles. No wheezes. No rhonchi. CV: Regular rate. Regular rhythm. No murmur. No rub. No edema. GI: Non-tender. Non-distended. No masses. No organomegaly. Normal bowel sounds. No hernia. Skin: Warm and dry. No nodule on exposed extremities. No rash on exposed extremities. M/S: No cyanosis. No joint deformity. No clubbing. Neuro: Awake. Grossly nonfocal    Psych: Oriented x 3. No anxiety or agitation. Hospital Course    #Left Lower Lobe Pulmonary Embolism   #Hx of DVT  #Chest Pain   - CTPA as above  - pt was not on any anticoagulation prior to admission   - started on Heparin gtt  - oncology and pulmonary consulted   - +fever at home   - monitor on telemetry and continuous pulse ox   - echo ordered   -Seen by heme-onc. Likely can be transitioned to oral anticoagulants in a.m. I started Eliquis. Discussed in detail with family. Will need lifelong anticoagulation     #Mild leukocytosis  - likely 2/2 above  Monitor     #Abnormal EKG  - troponin negative x2  - EKG with new anterior fascicular block  - monitor on telemetry  - echo done.      #Sickle cell crisis  #Sickle cell anemia  #Right hip pain   -presented with chest pain and right hip pain   -longstanding issue of med non-compliance   -Heme-onc consulted  -Hemoglobin-> 8.3   -IVF and pain control w/ dilaudid and toradol  - Supposed to be on chronic Hydrea for sickle cell anemia. Concern about compliance. Clarified dose with patient -he says he takes Hydrea 1000 mg twice daily. Patient was resumed on Hydrea last admission. Continue the same     #Hx of Priapism-treated by Urology with penile injection of phenylephrine.   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  -recent admission and patient was non-compliant w/ prescribed sildenafil  -has had success in past with sudafed, alkalinization, ice packs and supplemental O2  -Recent admission urology  recommendation is to continue sildenafil 20 mg daily, and Sudafed as needed and follow-up as needed        #LFT elevation, chronic  -bilirubin 8.5; AST 45  -likely 2/2 sickle cell  -patient denies RUQ pain   -Monitor     #Asthma  -without acute exacerbation   -continue home inhalers     #GERD  -no longer taking PPI      #Vitamin D deficiency      Pt  has a management plan in place     Hx of splenectomy     CBC:   Recent Labs     02/17/23 0305 02/18/23 0228   WBC 11.9* 5.8   HGB 8.3* 7.3*   HCT 22.2* 20.5*   MCV 95.1 97.9    402     BMP:   Recent Labs     02/17/23 0305 02/18/23 0228    138   K 4.2 3.8    106   CO2 27 24   BUN 10 6*   CREATININE 0.7* <0.5*     LIVER PROFILE:   Recent Labs     02/17/23 0305 02/18/23 0228   AST 45* 45*   ALT 18 20   BILITOT 8.5* 5.5*   ALKPHOS 70 62     PT/INR:   Recent Labs     02/17/23 0305   PROTIME 15.5*   INR 1.25*     APTT:   Recent Labs     02/17/23 0305   APTT 31.2     ECHO 2/17/23  Conclusions      Summary   Left ventricular systolic function is normal with ejection fraction   estimated at 55%. No regional wall motion abnormalities. The right ventricle is normal in size and function.    Normal left ventricular diastolic filling pressure. MIld biatrial enlargement. MIld mitral and tricuspid regurgitation. Systolic pulmonary artery pressure (SPAP) is normal estimated at 13 mmHg   (Right atrial pressure of 3 mmHg). Signature      ------------------------------------------------------------------   Electronically signed by Octavio Damon MD, [facility]   (Interpreting physician) on 02/17/2023 at 02:12 PM   ----------------------------------------------------------------      CT CHEST PULMONARY EMBOLISM W CONTRAST   Final Result   1. There are two small filling defects within left lower lobe segmental   pulmonary arteries concerning for pulmonary emboli. 2.  Minimal patchy ground-glass opacities of the left lung base are similar   in comparison with multiple prior examinations, favored secondary to chronic   change in the setting of sickle cell disease. XR HIP RIGHT (2-3 VIEWS)   Final Result   No evidence of acute osseous abnormality involving the pelvis or right hip. XR CHEST PORTABLE   Final Result   No acute cardiopulmonary abnormality. Mild cardiomegaly. Discharge Medications     Medication List        START taking these medications      * apixaban starter pack 5 MG Tbpk tablet  Commonly known as: ELIQUIS  Take 2 tablets by mouth 2 times daily for 7 days, THEN 1 tablet 2 times daily for 23 days. Patient will take 2 tablets bid for 6.5 days as he had his first dose of Elqiuis in the hospital.  Start taking on: February 18, 2023     * apixaban 5 MG Tabs tablet  Commonly known as: Eliquis  Take 1 tablet by mouth 2 times daily           * This list has 2 medication(s) that are the same as other medications prescribed for you. Read the directions carefully, and ask your doctor or other care provider to review them with you.                 CONTINUE taking these medications      albuterol sulfate  (90 Base) MCG/ACT inhaler  Commonly known as: PROVENTIL;VENTOLIN;PROAIR     folic acid 1 MG tablet  Commonly known as: FOLVITE  Take 2 tablets by mouth daily     hydroxyurea 500 MG chemo capsule  Commonly known as: HYDREA     levalbuterol 0.31 MG/3ML nebulization  Commonly known as: XOPENEX     oxyCODONE HCl 10 MG immediate release tablet  Commonly known as: OXY-IR     pseudoephedrine 60 MG tablet  Commonly known as: SUDAFED     sildenafil 20 MG tablet  Commonly known as: REVATIO  Take 1 tablet by mouth daily               Where to Get Your Medications        These medications were sent to [facility] [id number] [facility], [address] [place] [address], [address] [address]      Phone: [phone]   apixaban starter pack 5 MG Tbpk tablet       You can get these medications from any pharmacy    Bring a paper prescription for each of these medications  apixaban 5 MG Tabs tablet           Discharge Condition/Location: Stable    Follow Up: Follow up with PCP.         Sara Davila MD 2/18/2023 12:00 PM

## 2023-02-18 NOTE — PROGRESS NOTES
Patient taken off O2. Tolerating room air well. 95-98% on room air.      Pulse oximetry assessment   98% at rest on room air (if 88% or less, skip next steps)  94% while ambulating on room air

## 2023-02-18 NOTE — PROGRESS NOTES
RT Inhaler-Nebulizer Bronchodilator Protocol Note    There is a bronchodilator order in the chart from a provider indicating to follow the RT Bronchodilator Protocol and there is an Initiate RT Inhaler-Nebulizer Bronchodilator Protocol order as well (see protocol at bottom of note). CXR Findings:  XR CHEST PORTABLE    Result Date: 2/17/2023  No acute cardiopulmonary abnormality. Mild cardiomegaly. The findings from the last RT Protocol Assessment were as follows:   History Pulmonary Disease: Chronic pulmonary disease  Respiratory Pattern: Regular pattern and RR 12-20 bpm  Breath Sounds: Slightly diminished and/or crackles  Cough: Strong, spontaneous, non-productive  Indication for Bronchodilator Therapy: On home bronchodilators  Bronchodilator Assessment Score: 4    Aerosolized bronchodilator medication orders have been revised according to the RT Inhaler-Nebulizer Bronchodilator Protocol below. Respiratory Therapist to perform RT Therapy Protocol Assessment initially then follow the protocol. Repeat RT Therapy Protocol Assessment PRN for score 0-3 or on second treatment, BID, and PRN for scores above 3. No Indications - adjust the frequency to every 6 hours PRN wheezing or bronchospasm, if no treatments needed after 48 hours then discontinue using Per Protocol order mode. If indication present, adjust the RT bronchodilator orders based on the Bronchodilator Assessment Score as indicated below. Use Inhaler orders unless patient has one or more of the following: on home nebulizer, not able to hold breath for 10 seconds, is not alert and oriented, cannot activate and use MDI correctly, or respiratory rate 25 breaths per minute or more, then use the equivalent nebulizer order(s) with same Frequency and PRN reasons based on the score. If a patient is on this medication at home then do not decrease Frequency below that used at home.     0-3 - enter or revise RT bronchodilator order(s) to equivalent RT Bronchodilator order with Frequency of every 4 hours PRN for wheezing or increased work of breathing using Per Protocol order mode. 4-6 - enter or revise RT Bronchodilator order(s) to two equivalent RT bronchodilator orders with one order with BID Frequency and one order with Frequency of every 4 hours PRN wheezing or increased work of breathing using Per Protocol order mode. 7-10 - enter or revise RT Bronchodilator order(s) to two equivalent RT bronchodilator orders with one order with TID Frequency and one order with Frequency of every 4 hours PRN wheezing or increased work of breathing using Per Protocol order mode. 11-13 - enter or revise RT Bronchodilator order(s) to one equivalent RT bronchodilator order with QID Frequency and an Albuterol order with Frequency of every 4 hours PRN wheezing or increased work of breathing using Per Protocol order mode. Greater than 13 - enter or revise RT Bronchodilator order(s) to one equivalent RT bronchodilator order with every 4 hours Frequency and an Albuterol order with Frequency of every 2 hours PRN wheezing or increased work of breathing using Per Protocol order mode.        Electronically signed by Alden Sauceda RCP on 2/18/2023 at 7:16 AM

## 2023-02-18 NOTE — PROGRESS NOTES
Prn dilaudid given per mar for c/o pain. Blood pressure 111/68, pulse 62, temperature 97 °F (36.1 °C), temperature source Oral, resp. rate 18, height 5' 9\" (1.753 m), weight 189 lb 4.8 oz (85.9 kg), SpO2 100 %.

## 2023-02-21 LAB
BLOOD CULTURE, ROUTINE: NORMAL
CULTURE, BLOOD 2: NORMAL

## 2023-02-22 ENCOUNTER — APPOINTMENT (OUTPATIENT)
Dept: GENERAL RADIOLOGY | Age: 23
DRG: 812 | End: 2023-02-22
Payer: COMMERCIAL

## 2023-02-22 ENCOUNTER — HOSPITAL ENCOUNTER (INPATIENT)
Age: 23
LOS: 3 days | Discharge: HOME | DRG: 812 | End: 2023-02-25
Attending: STUDENT IN AN ORGANIZED HEALTH CARE EDUCATION/TRAINING PROGRAM | Admitting: INTERNAL MEDICINE
Payer: COMMERCIAL

## 2023-02-22 DIAGNOSIS — N48.32 PRIAPISM DUE TO SICKLE CELL DISEASE (HCC): ICD-10-CM

## 2023-02-22 DIAGNOSIS — Z78.9 PATIENT IS FULL CODE: ICD-10-CM

## 2023-02-22 DIAGNOSIS — D57.00 SICKLE CELL CRISIS (HCC): Primary | ICD-10-CM

## 2023-02-22 DIAGNOSIS — Z91.14 NONCOMPLIANCE WITH MEDICATION REGIMEN: ICD-10-CM

## 2023-02-22 DIAGNOSIS — R07.9 CHEST PAIN, UNSPECIFIED TYPE: ICD-10-CM

## 2023-02-22 DIAGNOSIS — D57.1 PRIAPISM DUE TO SICKLE CELL DISEASE (HCC): ICD-10-CM

## 2023-02-22 DIAGNOSIS — Z71.89 GOALS OF CARE, COUNSELING/DISCUSSION: ICD-10-CM

## 2023-02-22 LAB
A/G RATIO: 1.8 (ref 1.1–2.2)
ALBUMIN SERPL-MCNC: 4.4 G/DL (ref 3.4–5)
ALP BLD-CCNC: 79 U/L (ref 40–129)
ALT SERPL-CCNC: 31 U/L (ref 10–40)
ANION GAP SERPL CALCULATED.3IONS-SCNC: 14 MMOL/L (ref 3–16)
ANISOCYTOSIS: ABNORMAL
AST SERPL-CCNC: 47 U/L (ref 15–37)
BACTERIA: ABNORMAL /HPF
BASOPHILS ABSOLUTE: 0.1 K/UL (ref 0–0.2)
BASOPHILS RELATIVE PERCENT: 1 %
BILIRUB SERPL-MCNC: 5.5 MG/DL (ref 0–1)
BILIRUBIN URINE: ABNORMAL
BLOOD, URINE: ABNORMAL
BUN BLDV-MCNC: 8 MG/DL (ref 7–20)
CALCIUM SERPL-MCNC: 9.6 MG/DL (ref 8.3–10.6)
CHLORIDE BLD-SCNC: 106 MMOL/L (ref 99–110)
CLARITY: CLEAR
CO2: 22 MMOL/L (ref 21–32)
COLOR: YELLOW
CREAT SERPL-MCNC: 0.7 MG/DL (ref 0.9–1.3)
CRENATED RBC'S: ABNORMAL
EKG ATRIAL RATE: 75 BPM
EKG DIAGNOSIS: NORMAL
EKG P AXIS: 27 DEGREES
EKG P-R INTERVAL: 176 MS
EKG Q-T INTERVAL: 392 MS
EKG QRS DURATION: 102 MS
EKG QTC CALCULATION (BAZETT): 437 MS
EKG R AXIS: 77 DEGREES
EKG T AXIS: -23 DEGREES
EKG VENTRICULAR RATE: 75 BPM
EOSINOPHILS ABSOLUTE: 0.1 K/UL (ref 0–0.6)
EOSINOPHILS RELATIVE PERCENT: 1 %
GFR SERPL CREATININE-BSD FRML MDRD: >60 ML/MIN/{1.73_M2}
GLUCOSE BLD-MCNC: 94 MG/DL (ref 70–99)
GLUCOSE URINE: NEGATIVE MG/DL
HCT VFR BLD CALC: 24.7 % (ref 40.5–52.5)
HEMATOLOGY PATH CONSULT: NO
HEMOGLOBIN: 8.4 G/DL (ref 13.5–17.5)
IMMATURE RETIC FRACT: 0.76 (ref 0.21–0.37)
KETONES, URINE: NEGATIVE MG/DL
LEUKOCYTE ESTERASE, URINE: NEGATIVE
LYMPHOCYTES ABSOLUTE: 3.3 K/UL (ref 1–5.1)
LYMPHOCYTES RELATIVE PERCENT: 24 %
MCH RBC QN AUTO: 35.4 PG (ref 26–34)
MCHC RBC AUTO-ENTMCNC: 33.8 G/DL (ref 31–36)
MCV RBC AUTO: 104.9 FL (ref 80–100)
MICROSCOPIC EXAMINATION: YES
MONOCYTES ABSOLUTE: 1.1 K/UL (ref 0–1.3)
MONOCYTES RELATIVE PERCENT: 8 %
NEUTROPHILS ABSOLUTE: 9.1 K/UL (ref 1.7–7.7)
NEUTROPHILS RELATIVE PERCENT: 66 %
NITRITE, URINE: NEGATIVE
OVALOCYTES: ABNORMAL
PDW BLD-RTO: 28.3 % (ref 12.4–15.4)
PH UA: 6.5 (ref 5–8)
PLATELET # BLD: 474 K/UL (ref 135–450)
PLATELET SLIDE REVIEW: ABNORMAL
PMV BLD AUTO: 8.7 FL (ref 5–10.5)
POIKILOCYTES: ABNORMAL
POLYCHROMASIA: ABNORMAL
POTASSIUM SERPL-SCNC: 4.1 MMOL/L (ref 3.5–5.1)
PRO-BNP: 85 PG/ML (ref 0–124)
PROTEIN UA: 100 MG/DL
RBC # BLD: 2.36 M/UL (ref 4.2–5.9)
RBC UA: ABNORMAL /HPF (ref 0–4)
RETICULOCYTE ABSOLUTE COUNT: 0.43 M/UL
RETICULOCYTE COUNT PCT: 18.25 % (ref 0.5–2.18)
SEDIMENTATION RATE, ERYTHROCYTE: <1 MM/HR (ref 0–15)
SICKLE CELLS: ABNORMAL
SLIDE REVIEW: ABNORMAL
SODIUM BLD-SCNC: 142 MMOL/L (ref 136–145)
SPECIFIC GRAVITY UA: 1.01 (ref 1–1.03)
TARGET CELLS: ABNORMAL
TOTAL PROTEIN: 6.8 G/DL (ref 6.4–8.2)
TROPONIN: <0.01 NG/ML
URINE REFLEX TO CULTURE: ABNORMAL
URINE TYPE: ABNORMAL
UROBILINOGEN, URINE: 1 E.U./DL
WBC # BLD: 13.8 K/UL (ref 4–11)
WBC UA: ABNORMAL /HPF (ref 0–5)

## 2023-02-22 PROCEDURE — 2500000003 HC RX 250 WO HCPCS: Performed by: NURSE PRACTITIONER

## 2023-02-22 PROCEDURE — A4216 STERILE WATER/SALINE, 10 ML: HCPCS | Performed by: STUDENT IN AN ORGANIZED HEALTH CARE EDUCATION/TRAINING PROGRAM

## 2023-02-22 PROCEDURE — 6370000000 HC RX 637 (ALT 250 FOR IP): Performed by: INTERNAL MEDICINE

## 2023-02-22 PROCEDURE — 6360000002 HC RX W HCPCS

## 2023-02-22 PROCEDURE — 1200000000 HC SEMI PRIVATE

## 2023-02-22 PROCEDURE — 2580000003 HC RX 258: Performed by: NURSE PRACTITIONER

## 2023-02-22 PROCEDURE — 99285 EMERGENCY DEPT VISIT HI MDM: CPT

## 2023-02-22 PROCEDURE — 96361 HYDRATE IV INFUSION ADD-ON: CPT

## 2023-02-22 PROCEDURE — 2580000003 HC RX 258: Performed by: INTERNAL MEDICINE

## 2023-02-22 PROCEDURE — 81001 URINALYSIS AUTO W/SCOPE: CPT

## 2023-02-22 PROCEDURE — 71045 X-RAY EXAM CHEST 1 VIEW: CPT

## 2023-02-22 PROCEDURE — 93005 ELECTROCARDIOGRAM TRACING: CPT | Performed by: STUDENT IN AN ORGANIZED HEALTH CARE EDUCATION/TRAINING PROGRAM

## 2023-02-22 PROCEDURE — 71046 X-RAY EXAM CHEST 2 VIEWS: CPT

## 2023-02-22 PROCEDURE — 6360000002 HC RX W HCPCS: Performed by: STUDENT IN AN ORGANIZED HEALTH CARE EDUCATION/TRAINING PROGRAM

## 2023-02-22 PROCEDURE — 96374 THER/PROPH/DIAG INJ IV PUSH: CPT

## 2023-02-22 PROCEDURE — 93010 ELECTROCARDIOGRAM REPORT: CPT | Performed by: INTERNAL MEDICINE

## 2023-02-22 PROCEDURE — 80053 COMPREHEN METABOLIC PANEL: CPT

## 2023-02-22 PROCEDURE — 85045 AUTOMATED RETICULOCYTE COUNT: CPT

## 2023-02-22 PROCEDURE — 84484 ASSAY OF TROPONIN QUANT: CPT

## 2023-02-22 PROCEDURE — 85025 COMPLETE CBC W/AUTO DIFF WBC: CPT

## 2023-02-22 PROCEDURE — 6360000002 HC RX W HCPCS: Performed by: NURSE PRACTITIONER

## 2023-02-22 PROCEDURE — 6360000002 HC RX W HCPCS: Performed by: INTERNAL MEDICINE

## 2023-02-22 PROCEDURE — 96375 TX/PRO/DX INJ NEW DRUG ADDON: CPT

## 2023-02-22 PROCEDURE — 83880 ASSAY OF NATRIURETIC PEPTIDE: CPT

## 2023-02-22 PROCEDURE — 85652 RBC SED RATE AUTOMATED: CPT

## 2023-02-22 PROCEDURE — 2580000003 HC RX 258: Performed by: STUDENT IN AN ORGANIZED HEALTH CARE EDUCATION/TRAINING PROGRAM

## 2023-02-22 RX ORDER — 0.9 % SODIUM CHLORIDE 0.9 %
1000 INTRAVENOUS SOLUTION INTRAVENOUS ONCE
Status: COMPLETED | OUTPATIENT
Start: 2023-02-22 | End: 2023-02-22

## 2023-02-22 RX ORDER — ONDANSETRON 2 MG/ML
4 INJECTION INTRAMUSCULAR; INTRAVENOUS EVERY 6 HOURS PRN
Status: DISCONTINUED | OUTPATIENT
Start: 2023-02-22 | End: 2023-02-25 | Stop reason: HOSPADM

## 2023-02-22 RX ORDER — KETOROLAC TROMETHAMINE 30 MG/ML
30 INJECTION, SOLUTION INTRAMUSCULAR; INTRAVENOUS ONCE
Status: COMPLETED | OUTPATIENT
Start: 2023-02-22 | End: 2023-02-22

## 2023-02-22 RX ORDER — SODIUM CHLORIDE 9 MG/ML
INJECTION, SOLUTION INTRAVENOUS PRN
Status: DISCONTINUED | OUTPATIENT
Start: 2023-02-22 | End: 2023-02-25 | Stop reason: HOSPADM

## 2023-02-22 RX ORDER — ONDANSETRON 4 MG/1
4 TABLET, ORALLY DISINTEGRATING ORAL EVERY 8 HOURS PRN
Status: DISCONTINUED | OUTPATIENT
Start: 2023-02-22 | End: 2023-02-25 | Stop reason: HOSPADM

## 2023-02-22 RX ORDER — SODIUM CHLORIDE 0.9 % (FLUSH) 0.9 %
5-40 SYRINGE (ML) INJECTION EVERY 12 HOURS SCHEDULED
Status: DISCONTINUED | OUTPATIENT
Start: 2023-02-22 | End: 2023-02-25 | Stop reason: HOSPADM

## 2023-02-22 RX ORDER — SODIUM CHLORIDE, SODIUM LACTATE, POTASSIUM CHLORIDE, CALCIUM CHLORIDE 600; 310; 30; 20 MG/100ML; MG/100ML; MG/100ML; MG/100ML
INJECTION, SOLUTION INTRAVENOUS CONTINUOUS
Status: DISCONTINUED | OUTPATIENT
Start: 2023-02-22 | End: 2023-02-25 | Stop reason: HOSPADM

## 2023-02-22 RX ORDER — ACETAMINOPHEN 325 MG/1
650 TABLET ORAL EVERY 6 HOURS PRN
Status: DISCONTINUED | OUTPATIENT
Start: 2023-02-22 | End: 2023-02-25 | Stop reason: HOSPADM

## 2023-02-22 RX ORDER — HYDROMORPHONE HYDROCHLORIDE 2 MG/1
1 TABLET ORAL ONCE
Status: DISCONTINUED | OUTPATIENT
Start: 2023-02-22 | End: 2023-02-25 | Stop reason: HOSPADM

## 2023-02-22 RX ORDER — OXYCODONE HYDROCHLORIDE AND ACETAMINOPHEN 5; 325 MG/1; MG/1
1 TABLET ORAL EVERY 4 HOURS PRN
Status: DISCONTINUED | OUTPATIENT
Start: 2023-02-22 | End: 2023-02-23

## 2023-02-22 RX ORDER — LIDOCAINE HYDROCHLORIDE 10 MG/ML
5 INJECTION, SOLUTION EPIDURAL; INFILTRATION; INTRACAUDAL; PERINEURAL ONCE
Status: COMPLETED | OUTPATIENT
Start: 2023-02-22 | End: 2023-02-22

## 2023-02-22 RX ORDER — ENOXAPARIN SODIUM 100 MG/ML
40 INJECTION SUBCUTANEOUS DAILY
Status: DISCONTINUED | OUTPATIENT
Start: 2023-02-22 | End: 2023-02-22

## 2023-02-22 RX ORDER — HYDROXYUREA 500 MG/1
1000 CAPSULE ORAL 2 TIMES DAILY
Status: DISCONTINUED | OUTPATIENT
Start: 2023-02-22 | End: 2023-02-25 | Stop reason: HOSPADM

## 2023-02-22 RX ORDER — ACETAMINOPHEN 650 MG/1
650 SUPPOSITORY RECTAL EVERY 6 HOURS PRN
Status: DISCONTINUED | OUTPATIENT
Start: 2023-02-22 | End: 2023-02-25 | Stop reason: HOSPADM

## 2023-02-22 RX ORDER — FOLIC ACID 1 MG/1
2 TABLET ORAL DAILY
Status: DISCONTINUED | OUTPATIENT
Start: 2023-02-22 | End: 2023-02-25 | Stop reason: HOSPADM

## 2023-02-22 RX ORDER — SODIUM CHLORIDE 0.9 % (FLUSH) 0.9 %
5-40 SYRINGE (ML) INJECTION PRN
Status: DISCONTINUED | OUTPATIENT
Start: 2023-02-22 | End: 2023-02-25 | Stop reason: HOSPADM

## 2023-02-22 RX ORDER — POLYETHYLENE GLYCOL 3350 17 G/17G
17 POWDER, FOR SOLUTION ORAL DAILY PRN
Status: DISCONTINUED | OUTPATIENT
Start: 2023-02-22 | End: 2023-02-25 | Stop reason: HOSPADM

## 2023-02-22 RX ADMIN — LIDOCAINE HYDROCHLORIDE 5 ML: 10 INJECTION, SOLUTION EPIDURAL; INFILTRATION; INTRACAUDAL; PERINEURAL at 10:27

## 2023-02-22 RX ADMIN — SODIUM CHLORIDE 1000 ML: 9 INJECTION, SOLUTION INTRAVENOUS at 12:22

## 2023-02-22 RX ADMIN — HYDROMORPHONE HYDROCHLORIDE 1 MG: 1 INJECTION, SOLUTION INTRAMUSCULAR; INTRAVENOUS; SUBCUTANEOUS at 14:37

## 2023-02-22 RX ADMIN — SODIUM CHLORIDE 1000 ML: 9 INJECTION, SOLUTION INTRAVENOUS at 09:45

## 2023-02-22 RX ADMIN — FOLIC ACID 2 MG: 1 TABLET ORAL at 14:37

## 2023-02-22 RX ADMIN — HYDROMORPHONE HYDROCHLORIDE 1 MG: 1 INJECTION, SOLUTION INTRAMUSCULAR; INTRAVENOUS; SUBCUTANEOUS at 10:23

## 2023-02-22 RX ADMIN — HYDROMORPHONE HYDROCHLORIDE 1 MG: 1 INJECTION, SOLUTION INTRAMUSCULAR; INTRAVENOUS; SUBCUTANEOUS at 23:17

## 2023-02-22 RX ADMIN — OXYCODONE AND ACETAMINOPHEN 1 TABLET: 5; 325 TABLET ORAL at 20:10

## 2023-02-22 RX ADMIN — SODIUM CHLORIDE, POTASSIUM CHLORIDE, SODIUM LACTATE AND CALCIUM CHLORIDE: 600; 310; 30; 20 INJECTION, SOLUTION INTRAVENOUS at 20:10

## 2023-02-22 RX ADMIN — SODIUM CHLORIDE, POTASSIUM CHLORIDE, SODIUM LACTATE AND CALCIUM CHLORIDE: 600; 310; 30; 20 INJECTION, SOLUTION INTRAVENOUS at 14:35

## 2023-02-22 RX ADMIN — HYDROMORPHONE HYDROCHLORIDE 1 MG: 1 INJECTION, SOLUTION INTRAMUSCULAR; INTRAVENOUS; SUBCUTANEOUS at 10:46

## 2023-02-22 RX ADMIN — APIXABAN 5 MG: 5 TABLET, FILM COATED ORAL at 14:37

## 2023-02-22 RX ADMIN — HYDROMORPHONE HYDROCHLORIDE 1 MG: 1 INJECTION, SOLUTION INTRAMUSCULAR; INTRAVENOUS; SUBCUTANEOUS at 18:34

## 2023-02-22 RX ADMIN — OXYCODONE AND ACETAMINOPHEN 1 TABLET: 5; 325 TABLET ORAL at 15:31

## 2023-02-22 RX ADMIN — PHENYLEPHRINE HYDROCHLORIDE: 10 INJECTION INTRAVENOUS at 10:40

## 2023-02-22 RX ADMIN — HYDROXYUREA 1000 MG: 500 CAPSULE ORAL at 15:31

## 2023-02-22 RX ADMIN — KETOROLAC TROMETHAMINE 30 MG: 30 INJECTION, SOLUTION INTRAMUSCULAR; INTRAVENOUS at 12:18

## 2023-02-22 RX ADMIN — APIXABAN 5 MG: 5 TABLET, FILM COATED ORAL at 20:10

## 2023-02-22 ASSESSMENT — PAIN SCALES - GENERAL
PAINLEVEL_OUTOF10: 9
PAINLEVEL_OUTOF10: 9
PAINLEVEL_OUTOF10: 8
PAINLEVEL_OUTOF10: 9
PAINLEVEL_OUTOF10: 8
PAINLEVEL_OUTOF10: 8
PAINLEVEL_OUTOF10: 5
PAINLEVEL_OUTOF10: 8
PAINLEVEL_OUTOF10: 10
PAINLEVEL_OUTOF10: 8
PAINLEVEL_OUTOF10: 9
PAINLEVEL_OUTOF10: 10
PAINLEVEL_OUTOF10: 10
PAINLEVEL_OUTOF10: 8

## 2023-02-22 ASSESSMENT — PAIN DESCRIPTION - ORIENTATION
ORIENTATION: LEFT
ORIENTATION: MID;LEFT
ORIENTATION: MID;LEFT
ORIENTATION: LEFT
ORIENTATION: LEFT

## 2023-02-22 ASSESSMENT — PAIN DESCRIPTION - ONSET: ONSET: ON-GOING

## 2023-02-22 ASSESSMENT — PAIN DESCRIPTION - PAIN TYPE: TYPE: ACUTE PAIN

## 2023-02-22 ASSESSMENT — PAIN - FUNCTIONAL ASSESSMENT
PAIN_FUNCTIONAL_ASSESSMENT: ACTIVITIES ARE NOT PREVENTED
PAIN_FUNCTIONAL_ASSESSMENT: 0-10
PAIN_FUNCTIONAL_ASSESSMENT: ACTIVITIES ARE NOT PREVENTED

## 2023-02-22 ASSESSMENT — PAIN DESCRIPTION - DESCRIPTORS
DESCRIPTORS: ACHING;SHARP
DESCRIPTORS: ACHING;SHARP
DESCRIPTORS: SHARP;ACHING
DESCRIPTORS: ACHING
DESCRIPTORS: ACHING;SHARP

## 2023-02-22 ASSESSMENT — PAIN DESCRIPTION - LOCATION
LOCATION: GROIN;CHEST
LOCATION: CHEST
LOCATION: GROIN;CHEST
LOCATION: GROIN
LOCATION: CHEST
LOCATION: GROIN
LOCATION: CHEST
LOCATION: GROIN
LOCATION: CHEST
LOCATION: CHEST;GROIN
LOCATION: CHEST

## 2023-02-22 ASSESSMENT — PAIN SCALES - WONG BAKER: WONGBAKER_NUMERICALRESPONSE: 0

## 2023-02-22 ASSESSMENT — PAIN DESCRIPTION - FREQUENCY: FREQUENCY: CONTINUOUS

## 2023-02-22 NOTE — PROGRESS NOTES
Consult Call Back    Who: Dr. Noah Reveles  Date:2/22/2023,  Time:2:27 PM    Electronically signed by Archie Ham on 2/22/23 at 2:27 PM EST

## 2023-02-22 NOTE — ED NOTES
1023- 1mg dilaudid given pre procedure   1028 dr Marleny Leonard and evelyn dubose at bedside   1029- lidocaine injection into left side of penis by evelyn dubose   1030- lidocaine injected into right side of penis by evelyn dubose  1038- lidocaine injection upper left penis by evelyn dubose   1040- delmi injection right side of penis  1044-1 mg dilaudid dr Marleny Leonard verbal order bedside   1048- delmi injection left side of penis evelyn dubose RN  02/22/23 405 DOV Pina, [facility]  02/22/23 1050

## 2023-02-22 NOTE — PROGRESS NOTES
4 Eyes Skin Assessment     The patient is being assess for  Admission    I agree that 2 RN's have performed a thorough Head to Toe Skin Assessment on the patient. ALL assessment sites listed below have been assessed. Areas assessed by both nurses: Jorge Luis Villarreal RN student  [x]   Head, Face, and Ears   [x]   Shoulders, Back, and Chest  [x]   Arms, Elbows, and Hands   [x]   Coccyx, Sacrum, and Ischum  [x]   Legs, Feet, and Heels        Does the Patient have Skin Breakdown?   No         Shaun Prevention initiated:  NA   Wound Care Orders initiated:  NA      [facility] nurse consulted for Pressure Injury (Stage 3,4, Unstageable, DTI, NWPT, and Complex wounds):  NA      Nurse 1 eSignature: Electronically signed by Juan R Argueta RN on 2/22/23 at 3:04 PM EST    **SHARE this note so that the co-signing nurse is able to place an eSignature**    Nurse 2 eSignature: Electronically signed by Brittany Fontenot RN on 2/25/23 at 12:51 PM EST

## 2023-02-22 NOTE — CARE COORDINATION
CASE MANAGEMENT INITIAL ASSESSMENT      Reviewed chart and completed assessment with patient:Yes  Family present: No  Explained Case Management role/services. Yes    Primary contact information:terri Gooden    Health Care Decision Maker :   Primary Decision Maker: Berkley Javed - Parent - [phone]          Can this person be reached and be able to respond quickly, such as within a few minutes or hours? Yes    Admit date/status:2/22/2023  Diagnosis:Sickle cell   Is this a Readmission?:  Yes    Readmission Screening completed?: Yes     Insurance:[employer]    Precert required for SNF: Yes       3 night stay required: Yes    Living arrangements, Adls, care needs, prior to admission:Lives at home w/family, [address] at home:  Walker__Cane__RTS__ BSC__Shower Chair__  02__ HHN__ CPAP__  BiPap__  Hospital Bed__ W/C___ Other_____    Services in the home and/or outpatient, prior to [address]                                Medications:Yes Prescription coverage? Yes Will pt require financial assistance with medications No     Transportation needs: Family       PT/OT recs:    Hospital Exemption Notification (HEN):needed for snf    Barriers to discharge:medical complications     Plan/comments:Referred to patient for discharge planning. Patient is a 20year old male presenting to [facility] ED with sickle cell complications. Patient drowsy and did not provide a lot of information. Pt stated he will return home and is independent with his ADLs. He denied community services or [place] [phone].      Electronically signed by SOLEDAD [place] Student on 2/22/2023 at 1:12 PM    Electronically signed by SOLEDAD RaviS on 2/22/2023 at 4:28 PM   ECOC on chart for MD signature

## 2023-02-22 NOTE — PROGRESS NOTES
Patient is requesting a regular diet. Message sent to Dr. Reina Leal. Chemo precautions started d/t patient takes hydrea orally.

## 2023-02-22 NOTE — FLOWSHEET NOTE
Admitted to room C337. PRN Dilaudid given for pain 9/10. Scheduled meds given. 02/22/23 1456   Assessment   Charting Type Admission   Psychosocial   Psychosocial (WDL) WDL   Neurological   Neuro (WDL) WDL   Level of Consciousness 0   Orientation Level Oriented X4   [place] Coma Scale   Eye Opening 4   Best Verbal Response 5   Best Motor Response 6   [place] Coma Scale Score 15   HEENT (Head, Ears, Eyes, Nose, & Throat)   HEENT (WDL) X   Right Eye Impaired vision;Glasses   Left Eye Impaired vision;Glasses  (glasses are at home)   Respiratory   Respiratory (WDL) WDL   Respiratory Pattern Regular   Respiratory Depth Normal   Respiratory Quality/Effort Unlabored   Chest Assessment Trachea midline; Chest expansion symmetrical   Breath Sounds   Right Upper Lobe Diminished;Clear   Right Middle Lobe Diminished;Clear   Right Lower Lobe Diminished;Clear   Left Upper Lobe Diminished;Clear   Left Lower Lobe Diminished;Clear   Cardiac   Cardiac (WDL) X   Cardiac Regularity Regular   Heart Sounds S1, S2   Cardiac Symptoms Chest pain   Cardiac Monitor   Cardiac/Telemetry Monitor On No   Gastrointestinal   Abdominal (WDL) WDL   Genitourinary   Genitourinary (WDL) WDL   Suprapubic Tenderness No   Dysuria (Pain/Burning w/Urination) No   Difficulty Urinating/Starting Stream RAY   Urine Assessment   Urinary Status Voiding; Bathroom privileges   Urinary Incontinence Absent   Genitalia   Male Genitalia Priapism  (no erection at this time)   Peripheral Vascular   Peripheral Vascular (WDL) WDL   Edema None   Skin Integumentary    Skin Integumentary (WDL) WDL   Musculoskeletal   Musculoskeletal (WDL) WDL

## 2023-02-22 NOTE — ED PROVIDER NOTES
[address] [place] [address]  ED  [address] [address]  Dept: [phone]  Dept Fax: [phone]  Loc: [facility]    Chief Complaint   Patient presents with    Sickle Cell Pain Crisis    Chest Pain     Patient complains of pain in groin and chest. Pain in groin started at 6 am and pain in chest that started last week        HPI    Quinn Valiente is a 20 y.o. male who presents to the emergency department with worsening chest pain then when he was discharged from this hospital with a diagnosis of bilateral pulmonary emboli. He was admitted from February 17 and discharged on the 18th with Eliquis. He states his chest pain has never gone away and is actually worse today. He is also complaining of a persistent painful erection that started at 6 AM.  He has a history of sickle cell and has frequent hospitalizations and ED visits for priapism where he often has to have it detumesced. He is supposed be taking Hydrea and sildenafil but he is noncompliant. He has been referred to the sickle cell/priapism clinic at [facility] and he has yet to go there. He has been dealing with priapism issues for the last year. He states when he was about 7years old he had priapism issues that subsequently went away and he has not had any problems with this since up until about a year ago. He denies lightheadedness, dizziness, shortness of breath, leg swelling, nausea or vomiting. REVIEW OF SYSTEMS    Cardiac: see HPI, no syncope  Respiratory: no shortness of breath, no cough, no hemoptysis  GI: No vomiting or diarrhea  : No dysuria or hematuria  General: No fever or chills  All other systems reviewed and are negative.     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)     Sickle cell anemia (HCC)      Past Surgical History:   Procedure Laterality Date    ABSCESS DRAINAGE Left 2012    leg    SPLENECTOMY         CURRENT MEDICATIONS  (may include discharge medications prescribed in the ED)  REM      ALLERGIES    Allergies   Allergen Reactions    Morphine Shortness Of Breath     Other reaction(s): Histamine-like Reaction  Has asthma exacerbation with morphine-histamine type reaction         SOCIAL & FAMILY HISTORY    Social History     Socioeconomic History    Marital status: Single     Spouse name: None    Number of children: 0    Years of education: None    Highest education level: None   Tobacco Use    Smoking status: Never     Passive exposure: Never    Smokeless tobacco: Never   Vaping Use    Vaping Use: Never used   Substance and Sexual Activity    Alcohol use: Not Currently    Drug use: Never    Sexual activity: Not Currently     Partners: Female     Family History   Problem Relation Age of Onset    Other Father         sarcoidosis       PHYSICAL EXAM    VITAL SIGNS: /65   Pulse 91   Temp 98 °F (36.7 °C) (Oral)   Resp 16   Ht 5' 9\" (1.753 m)   Wt 191 lb 3.2 oz (86.7 kg)   SpO2 93%   BMI 28.24 kg/m²    Constitutional:  Well developed, well nourished, no acute distress   HENT:  Atraumatic, dry mucus membranes  Neck: supple, no JVD   Respiratory:  Lungs clear to auscultation bilaterally, no retractions   Cardiovascular: Regular rhythm and rate, S1-S2.   No murmurs  Vascular: Radial and DP pulses 2+ and equal bilaterally  GI:  Soft, nontender, nondistended without rebound or guarding normal bowel sounds  : Erect penis that is circumcised with 2 descended testicles  Musculoskeletal:  no lower extremity edema, no lower extremity asymmetry, no calf tenderness, no thigh tenderness, no acute deformities  Integument:  Skin warm and dry, no petechiae   Neurologic:  Alert & oriented, no slurred speech  Psych: Pleasant affect, no hallucinations    EKG    I have reviewed and interpreted the EKG with the following findings:  Ventricular rate 75 bpm, MI interval 176 ms, QRS duration 102 ms  No ST elevation or depression  Interpretation is a normal sinus rhythm    RADIOLOGY/PROCEDURES    XR CHEST PORTABLE   Final Result   The lungs appear clear with no acute pulmonary finding. Labs Reviewed   CBC WITH AUTO DIFFERENTIAL - Abnormal; Notable for the following components:       Result Value    WBC 13.8 (*)     RBC 2.36 (*)     Hemoglobin 8.4 (*)     Hematocrit 24.7 (*)     .9 (*)     MCH 35.4 (*)     RDW 28.3 (*)     Platelets 207 (*)     Neutrophils Absolute 9.1 (*)     Anisocytosis 2+ (*)     Polychromasia 1+ (*)     Poikilocytes 2+ (*)     CRENATED RBC'S Occasional (*)     Ovalocytes Occasional (*)     Target Cells 1+ (*)     Sickle Cells 2+ (*)     All other components within normal limits   COMPREHENSIVE METABOLIC PANEL - Abnormal; Notable for the following components:    Creatinine 0.7 (*)     Total Bilirubin 5.5 (*)     AST 47 (*)     All other components within normal limits   RETICULOCYTES - Abnormal; Notable for the following components:    Retic Ct Pct 18.25 (*)     Immature Retic Fract 0.76 (*)     All other components within normal limits   URINALYSIS WITH REFLEX TO CULTURE - Abnormal; Notable for the following components:    Bilirubin Urine SMALL (*)     Blood, Urine MODERATE (*)     Protein,  (*)     All other components within normal limits   MICROSCOPIC URINALYSIS - Abnormal; Notable for the following components:    Bacteria, UA 1+ (*)     All other components within normal limits   TROPONIN   BRAIN NATRIURETIC PEPTIDE   SEDIMENTATION RATE   CBC WITH AUTO DIFFERENTIAL   COMPREHENSIVE METABOLIC PANEL W/ REFLEX TO MG FOR LOW K   RETICULOCYTES     PROCEDURE:  Patient had persistent priapism. The patient's penis was cleansed with chlorhexidine and saline.   I performed a regional block of the penis by injecting lidocaine without epinephrine which was injected at the base of the shaft of the penis bilaterally (5 cc each side) after aspirating and incrementally injecting. Once the patient achieved anesthesia, I aspirated blood and then injected 0.5 mg of phenylephrine in 4.5 mL of NSS to the right cavernous sinus and the procedure was then repeated on the left cavernous sinus. The patient tolerated the procedure well. The patient was reevaluated in 30 minutes and had full detumescence. ED COURSE & MEDICAL DECISION MAKING    Pertinent Labs & Imaging studies reviewed and interpreted. (See chart for details)      See chart for details of medications given during the ED stay.     Vitals:    02/22/23 1313 02/22/23 1420 02/22/23 1531 02/22/23 1600   BP: (!) 96/48 110/64  122/65   Pulse: 65 75  91   Resp: 19 18 18 16   Temp:  98.3 °F (36.8 °C)  98 °F (36.7 °C)   TempSrc:  Oral  Oral   SpO2: 94% 96%  93%   Weight:  191 lb 3.2 oz (86.7 kg)     Height:  5' 9\" (1.753 m)         Medications   HYDROmorphone (DILAUDID) tablet 1 mg (1 mg Oral Not Given 2/22/23 1219)   apixaban (ELIQUIS) tablet 5 mg (5 mg Oral Given 6/29/69 2409)   folic acid (FOLVITE) tablet 2 mg (2 mg Oral Given 2/22/23 1437)   hydroxyurea (HYDREA) chemo capsule 1,000 mg (1,000 mg Oral Given 2/22/23 1531)   sodium chloride flush 0.9 % injection 5-40 mL (has no administration in time range)   sodium chloride flush 0.9 % injection 5-40 mL (has no administration in time range)   0.9 % sodium chloride infusion (has no administration in time range)   ondansetron (ZOFRAN-ODT) disintegrating tablet 4 mg (has no administration in time range)     Or   ondansetron (ZOFRAN) injection 4 mg (has no administration in time range)   polyethylene glycol (GLYCOLAX) packet 17 g (has no administration in time range)   acetaminophen (TYLENOL) tablet 650 mg (has no administration in time range)     Or   acetaminophen (TYLENOL) suppository 650 mg (has no administration in time range) oxyCODONE-acetaminophen (PERCOCET) 5-325 MG per tablet 1 tablet (1 tablet Oral Given 2/22/23 1531)   lactated ringers IV soln infusion ( IntraVENous Rate/Dose Verify 2/22/23 1713)   HYDROmorphone (DILAUDID) injection 1 mg (1 mg IntraVENous Given 2/22/23 1437)   lidocaine PF 1 % injection 5 mL (5 mLs IntraDERmal Given 2/22/23 1027)   HYDROmorphone (DILAUDID) injection 1 mg (1 mg IntraVENous Given 2/22/23 1023)   0.9 % sodium chloride bolus (0 mLs IntraVENous Stopped 2/22/23 1219)   phenylephrine (STEVE-SYNEPHRINE) 1 mg in sodium chloride (PF) 0.9 % 10 mL syringe ( IntraCAVernosal Given by Other 2/22/23 1040)   0.9 % sodium chloride bolus (0 mLs IntraVENous Stopped 2/22/23 1349)   ketorolac (TORADOL) injection 30 mg (30 mg IntraVENous Given 2/22/23 1218)     This patient was seen and evaluated by myself and my attending physician Dr. Vicenta Rodriguez    Differential diagnosis includes but is not limited to sickle cell crisis, anemia, dehydration, ROSAURA, electrolyte derangement, ACS, thoracic aortic dissection, other    He is arriving with a persistent erection but hemodynamically stable and afebrile. He has had worsening chest pain since being discharged    He has frequent ED visits for concerns of sickle cell crisis, chest pain and frequent erections that required detumescence. He is supposed to be taking Hydrea and sildenafil which he is noncompliant with. He has been given [facility] clinic to follow-up with which he has not done yet due to transportation issues. He currently resides with his parents. He has had problems with priapism for the last year and woke up with a persistent painful erection again this morning at 6 AM.    He also had an admission from February 17 through 18 and was discharged home with Eliquis after finding 2 small pulmonary emboli.     He has a white blood cell count today of 13.8  H&H 8.4 and 24.7 (this is improving on chart review where he had an H&H of 7.3 and 20.5 on February 18)  .9  MCH 35.4  MCHC 33.8  RDW 28.3  Platelet count 907    He has 2+ sickle cells on his smear  Immature Retic Fract 0.76  Retic Ct Abs 0.430  Retic Ct Pct 18.25    His urine is dark  Small bilirubin  Moderate blood  100 protein  Negative nitrates  Negative leukocytes  1+ bacteria  0-2 whites    Detumescence was achieved after I injected phenylephrine. Please see procedure note for details of this. I have reviewed the imaging studies today and I agree with the radiologist interpretation that showed  XR CHEST PORTABLE   Final Result   The lungs appear clear with no acute pulmonary finding. The patient received 2 separate rounds of 1 mg IV Dilaudid prior to the detumescence procedure due to the pain that he is having from the erection. Once detumescence was achieved, he was given Toradol for pain control. He is on a care plan for opioids. He was given a total of 2 L of IV crystalloid bolus    Chronic conditions affecting care:  has a past medical history of Accidental overdose, Asthma, DVT (deep venous thrombosis) ([facility] [address] Enlarged heart, 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 ([facility] [address] and Sickle cell anemia ([facility] [address] Social Determinants of Health: noncompliance with meds, chronic disease of sickle cell anemia    Goals of Care Discussed: discussed, patient agrees with the plan of care for the admission. I reviewed the care plan that he has on record for multiple requests for IV Dilaudid. The patient is aware of this and he agrees with the admission. Code Status Discussed: full code    CRITICAL CARE NOTE:  There was a high probability of clinically significant life-threatening deterioration of the patient's condition requiring my urgent intervention. I personally saw the patient and independently provided 35 minutes of non-concurrent critical care out of the total shared critical care time provided.     This includes multiple reevaluations, vital sign monitoring, pulse oximetry monitoring, telemetry monitoring, clinical response to the IV medications, reviewing the nursing notes, consultation time, dictation/documentation time, and interpretation of the labwork. (This time excludes time spent performing procedures). FINAL IMPRESSION    1. Sickle cell crisis (HCC)    2. Chest pain, unspecified type    3. 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 ([facility] [address]    4. Goals of care, counseling/discussion    5. Noncompliance with medication regimen    6.  Patient is full code        PLAN  Admission to the hospital    (Please note that this note was completed with a voice recognition program.  Every attempt was made to edit the dictations, but inevitably there remain words that are mis-transcribed.)        Wiliam Morgan, CARMELITA - LYNNETTE  02/22/23 4012

## 2023-02-23 LAB
A/G RATIO: 1.9 (ref 1.1–2.2)
ALBUMIN SERPL-MCNC: 3.7 G/DL (ref 3.4–5)
ALP BLD-CCNC: 60 U/L (ref 40–129)
ALT SERPL-CCNC: 34 U/L (ref 10–40)
ANION GAP SERPL CALCULATED.3IONS-SCNC: 8 MMOL/L (ref 3–16)
ANISOCYTOSIS: ABNORMAL
AST SERPL-CCNC: 47 U/L (ref 15–37)
BASOPHILS ABSOLUTE: 0 K/UL (ref 0–0.2)
BASOPHILS RELATIVE PERCENT: 0 %
BILIRUB SERPL-MCNC: 3.2 MG/DL (ref 0–1)
BUN BLDV-MCNC: 7 MG/DL (ref 7–20)
CALCIUM SERPL-MCNC: 8.8 MG/DL (ref 8.3–10.6)
CHLORIDE BLD-SCNC: 105 MMOL/L (ref 99–110)
CO2: 24 MMOL/L (ref 21–32)
CREAT SERPL-MCNC: 0.6 MG/DL (ref 0.9–1.3)
EOSINOPHILS ABSOLUTE: 0.1 K/UL (ref 0–0.6)
EOSINOPHILS RELATIVE PERCENT: 1 %
GFR SERPL CREATININE-BSD FRML MDRD: >60 ML/MIN/{1.73_M2}
GLUCOSE BLD-MCNC: 99 MG/DL (ref 70–99)
HCT VFR BLD CALC: 20.5 % (ref 40.5–52.5)
HEMOGLOBIN: 7.1 G/DL (ref 13.5–17.5)
IMMATURE RETIC FRACT: 0.71 (ref 0.21–0.37)
LYMPHOCYTES ABSOLUTE: 3.5 K/UL (ref 1–5.1)
LYMPHOCYTES RELATIVE PERCENT: 36 %
MCH RBC QN AUTO: 36.8 PG (ref 26–34)
MCHC RBC AUTO-ENTMCNC: 34.7 G/DL (ref 31–36)
MCV RBC AUTO: 106.3 FL (ref 80–100)
MONOCYTES ABSOLUTE: 0.4 K/UL (ref 0–1.3)
MONOCYTES RELATIVE PERCENT: 4 %
NEUTROPHILS ABSOLUTE: 5.7 K/UL (ref 1.7–7.7)
NEUTROPHILS RELATIVE PERCENT: 59 %
OVALOCYTES: ABNORMAL
PDW BLD-RTO: 26.4 % (ref 12.4–15.4)
PLATELET # BLD: 391 K/UL (ref 135–450)
PMV BLD AUTO: 8.2 FL (ref 5–10.5)
POIKILOCYTES: ABNORMAL
POLYCHROMASIA: ABNORMAL
POTASSIUM REFLEX MAGNESIUM: 3.9 MMOL/L (ref 3.5–5.1)
RBC # BLD: 1.93 M/UL (ref 4.2–5.9)
RETICULOCYTE ABSOLUTE COUNT: 0.34 M/UL
RETICULOCYTE COUNT PCT: 17.67 % (ref 0.5–2.18)
SICKLE CELLS: ABNORMAL
SLIDE REVIEW: ABNORMAL
SODIUM BLD-SCNC: 137 MMOL/L (ref 136–145)
STOMATOCYTES: ABNORMAL
TARGET CELLS: ABNORMAL
TOTAL PROTEIN: 5.6 G/DL (ref 6.4–8.2)
WBC # BLD: 9.7 K/UL (ref 4–11)

## 2023-02-23 PROCEDURE — 6370000000 HC RX 637 (ALT 250 FOR IP): Performed by: INTERNAL MEDICINE

## 2023-02-23 PROCEDURE — 6360000002 HC RX W HCPCS: Performed by: INTERNAL MEDICINE

## 2023-02-23 PROCEDURE — 80053 COMPREHEN METABOLIC PANEL: CPT

## 2023-02-23 PROCEDURE — 1200000000 HC SEMI PRIVATE

## 2023-02-23 PROCEDURE — 2580000003 HC RX 258: Performed by: INTERNAL MEDICINE

## 2023-02-23 PROCEDURE — 36415 COLL VENOUS BLD VENIPUNCTURE: CPT

## 2023-02-23 PROCEDURE — 85025 COMPLETE CBC W/AUTO DIFF WBC: CPT

## 2023-02-23 PROCEDURE — 85045 AUTOMATED RETICULOCYTE COUNT: CPT

## 2023-02-23 PROCEDURE — 6370000000 HC RX 637 (ALT 250 FOR IP)

## 2023-02-23 RX ORDER — OXYCODONE HYDROCHLORIDE 5 MG/1
10 TABLET ORAL EVERY 4 HOURS PRN
Status: DISCONTINUED | OUTPATIENT
Start: 2023-02-23 | End: 2023-02-25 | Stop reason: HOSPADM

## 2023-02-23 RX ADMIN — HYDROMORPHONE HYDROCHLORIDE 1 MG: 1 INJECTION, SOLUTION INTRAMUSCULAR; INTRAVENOUS; SUBCUTANEOUS at 12:34

## 2023-02-23 RX ADMIN — APIXABAN 5 MG: 5 TABLET, FILM COATED ORAL at 20:11

## 2023-02-23 RX ADMIN — HYDROMORPHONE HYDROCHLORIDE 1 MG: 1 INJECTION, SOLUTION INTRAMUSCULAR; INTRAVENOUS; SUBCUTANEOUS at 08:07

## 2023-02-23 RX ADMIN — HYDROMORPHONE HYDROCHLORIDE 1 MG: 1 INJECTION, SOLUTION INTRAMUSCULAR; INTRAVENOUS; SUBCUTANEOUS at 03:21

## 2023-02-23 RX ADMIN — OXYCODONE 10 MG: 5 TABLET ORAL at 15:00

## 2023-02-23 RX ADMIN — HYDROXYUREA 1000 MG: 500 CAPSULE ORAL at 20:12

## 2023-02-23 RX ADMIN — HYDROXYUREA 1000 MG: 500 CAPSULE ORAL at 08:15

## 2023-02-23 RX ADMIN — OXYCODONE 10 MG: 5 TABLET ORAL at 21:48

## 2023-02-23 RX ADMIN — FOLIC ACID 2 MG: 1 TABLET ORAL at 08:15

## 2023-02-23 RX ADMIN — SODIUM CHLORIDE, POTASSIUM CHLORIDE, SODIUM LACTATE AND CALCIUM CHLORIDE: 600; 310; 30; 20 INJECTION, SOLUTION INTRAVENOUS at 03:24

## 2023-02-23 RX ADMIN — APIXABAN 5 MG: 5 TABLET, FILM COATED ORAL at 08:15

## 2023-02-23 RX ADMIN — OXYCODONE AND ACETAMINOPHEN 1 TABLET: 5; 325 TABLET ORAL at 06:36

## 2023-02-23 RX ADMIN — HYDROMORPHONE HYDROCHLORIDE 1 MG: 1 INJECTION, SOLUTION INTRAMUSCULAR; INTRAVENOUS; SUBCUTANEOUS at 20:39

## 2023-02-23 RX ADMIN — Medication 10 ML: at 08:15

## 2023-02-23 RX ADMIN — SODIUM CHLORIDE, POTASSIUM CHLORIDE, SODIUM LACTATE AND CALCIUM CHLORIDE: 600; 310; 30; 20 INJECTION, SOLUTION INTRAVENOUS at 16:46

## 2023-02-23 RX ADMIN — SODIUM CHLORIDE, POTASSIUM CHLORIDE, SODIUM LACTATE AND CALCIUM CHLORIDE: 600; 310; 30; 20 INJECTION, SOLUTION INTRAVENOUS at 10:48

## 2023-02-23 RX ADMIN — HYDROMORPHONE HYDROCHLORIDE 1 MG: 1 INJECTION, SOLUTION INTRAMUSCULAR; INTRAVENOUS; SUBCUTANEOUS at 16:42

## 2023-02-23 RX ADMIN — OXYCODONE AND ACETAMINOPHEN 1 TABLET: 5; 325 TABLET ORAL at 00:57

## 2023-02-23 RX ADMIN — OXYCODONE 10 MG: 5 TABLET ORAL at 10:44

## 2023-02-23 ASSESSMENT — PAIN DESCRIPTION - LOCATION
LOCATION: CHEST
LOCATION: PENIS
LOCATION: CHEST
LOCATION: CHEST
LOCATION: PENIS
LOCATION: OTHER (COMMENT)

## 2023-02-23 ASSESSMENT — PAIN DESCRIPTION - ORIENTATION
ORIENTATION: LEFT;MID
ORIENTATION: ANTERIOR;MID
ORIENTATION: ANTERIOR;LOWER
ORIENTATION: LEFT;MID
ORIENTATION: MID
ORIENTATION: LEFT;MID

## 2023-02-23 ASSESSMENT — PAIN SCALES - GENERAL
PAINLEVEL_OUTOF10: 8
PAINLEVEL_OUTOF10: 10
PAINLEVEL_OUTOF10: 8
PAINLEVEL_OUTOF10: 9
PAINLEVEL_OUTOF10: 8
PAINLEVEL_OUTOF10: 8
PAINLEVEL_OUTOF10: 9
PAINLEVEL_OUTOF10: 8
PAINLEVEL_OUTOF10: 5
PAINLEVEL_OUTOF10: 7
PAINLEVEL_OUTOF10: 10

## 2023-02-23 ASSESSMENT — PAIN DESCRIPTION - DESCRIPTORS
DESCRIPTORS: ACHING;SHARP
DESCRIPTORS: TIGHTNESS;THROBBING
DESCRIPTORS: SHARP
DESCRIPTORS: ACHING
DESCRIPTORS: TIGHTNESS
DESCRIPTORS: ACHING
DESCRIPTORS: THROBBING
DESCRIPTORS: ACHING;SHARP
DESCRIPTORS: TIGHTNESS;THROBBING
DESCRIPTORS: ACHING;SHARP
DESCRIPTORS: ACHING;SORE

## 2023-02-23 ASSESSMENT — PAIN - FUNCTIONAL ASSESSMENT
PAIN_FUNCTIONAL_ASSESSMENT: ACTIVITIES ARE NOT PREVENTED

## 2023-02-23 ASSESSMENT — PAIN SCALES - WONG BAKER
WONGBAKER_NUMERICALRESPONSE: 0

## 2023-02-23 NOTE — PLAN OF CARE
Problem: Pain  Goal: Verbalizes/displays adequate comfort level or baseline comfort level  2/22/2023 2140 by Tamiko Frederick RN  Outcome: Progressing  2/22/2023 2140 by Tamiko Frederick RN  Outcome: Not Progressing  Flowsheets (Taken 2/22/2023 1420 by Awa Gonzáles)  Verbalizes/displays adequate comfort level or baseline comfort level:   Encourage patient to monitor pain and request assistance   Assess pain using appropriate pain scale   Administer analgesics based on type and severity of pain and evaluate response   Implement non-pharmacological measures as appropriate and evaluate response   Consider cultural and social influences on pain and pain management   Notify Licensed Independent Practitioner if interventions unsuccessful or patient reports new pain     Problem: Safety - Adult  Goal: Free from fall injury  2/22/2023 2140 by Tamiko Frederick RN  Outcome: Progressing  2/22/2023 2140 by Tamiko Frederick RN  Outcome: Not Progressing     Problem: ABCDS Injury Assessment  Goal: Absence of physical injury  2/22/2023 2140 by Tamiko Fredeirck RN  Outcome: Progressing  2/22/2023 2140 by Tamiko Frederick RN  Outcome: Not Progressing  Flowsheets (Taken 2/22/2023 1444 by Moose Larson RN)  Absence of Physical Injury: Implement safety measures based on patient assessment     Problem: Pain  Goal: Verbalizes/displays adequate comfort level or baseline comfort level  2/22/2023 2140 by Tamiko Frederick RN  Outcome: Progressing  2/22/2023 2140 by Tamiko Frederick RN  Outcome: Not Progressing  Flowsheets (Taken 2/22/2023 1420 by Awa Gonzáles)  Verbalizes/displays adequate comfort level or baseline comfort level:   Encourage patient to monitor pain and request assistance   Assess pain using appropriate pain scale   Administer analgesics based on type and severity of pain and evaluate response   Implement non-pharmacological measures as appropriate and evaluate response   Consider cultural and social influences on pain and pain management   Notify Licensed Independent Practitioner if interventions unsuccessful or patient reports new pain     Problem: Safety - Adult  Goal: Free from fall injury  2/22/2023 2140 by Ignacio Hardy RN  Outcome: Progressing  2/22/2023 2140 by Ignacio Hardy RN  Outcome: Not Progressing     Problem: ABCDS Injury Assessment  Goal: Absence of physical injury  2/22/2023 2140 by Ignacio Hardy RN  Outcome: Progressing  2/22/2023 2140 by Ignacio Hardy RN  Outcome: Not Progressing  Flowsheets (Taken 2/22/2023 1444 by Elizabeth Mccormick RN)  Absence of Physical Injury: Implement safety measures based on patient assessment

## 2023-02-23 NOTE — CARE COORDINATION
Chart reviewed. Care managed per IM, hem onc and urology. Dr Aliyah Meier to perform intracavernosal injection of phenylephrine at bedside. Following for pain control. IPTA.  Grisel Arroyo RN

## 2023-02-23 NOTE — CONSULTS
[facility]    Consult Note      Requesting Physician:  Elsa Baez    CHIEF COMPLAINT:  Sickle Cell Crisis       HISTORY OF PRESENT ILLNESS:    Bandar Monsivais is a 20 y.o. male who presents to the emergency department with worsening chest pain then when he was discharged from this hospital with a diagnosis of bilateral pulmonary emboli. He was admitted from February 17 and discharged on the 18th with Eliquis. He states his chest pain has never gone away and is actually worse today. He is also complaining of a persistent painful erection that started at 6 AM.  He has a history of sickle cell and has frequent hospitalizations and ED visits for priapism where he often has to have it detumesced. He is supposed be taking Hydrea and sildenafil but he is noncompliant. He has been referred to the sickle cell/priapism clinic at [facility] and he has yet to go there. He has been dealing with priapism issues for the last year. He states when he was about 7years old he had priapism issues that subsequently went away and he has not had any problems with this since up until about a year ago. He denies lightheadedness, dizziness, shortness of breath, leg swelling, nausea or vomiting. Mr. Josias Blackmon  is a 20 y.o. male we are seeing in consultation for     ICD-10-CM    1. Sickle cell crisis (HCC)  D57.00       2. Chest pain, unspecified type  R07.9       3. 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 Goals of care, counseling/discussion  Z71.89       5. Noncompliance with medication regimen  Z91.14       6.  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 ([facility]     Sickle cell anemia ([facility]        Past Surgical History:  Past Surgical History:   Procedure Laterality Date    ABSCESS DRAINAGE Left 2012    leg    SPLENECTOMY         Current Medications:  Current Facility-Administered Medications   Medication Dose Route Frequency Provider Last Rate Last Admin    HYDROmorphone (DILAUDID) tablet 1 mg  1 mg Oral Once Matthew Franco MD        apixaban Mela Hoffmann) tablet 5 mg  5 mg Oral BID Nils Abbott MD   5 mg at 14/17/55 4053    folic acid (FOLVITE) tablet 2 mg  2 mg Oral Daily Nils Abbott MD   2 mg at 02/22/23 1437    hydroxyurea (HYDREA) chemo capsule 1,000 mg  1,000 mg Oral BID Nils Abbott MD   1,000 mg at 02/22/23 1531    sodium chloride flush 0.9 % injection 5-40 mL  5-40 mL IntraVENous 2 times per day Jarett Najera MD        sodium chloride flush 0.9 % injection 5-40 mL  5-40 mL IntraVENous PRN Nils Abbott MD        0.9 % sodium chloride infusion   IntraVENous PRN Nils Abbott MD        ondansetron (ZOFRAN-ODT) disintegrating tablet 4 mg  4 mg Oral Q8H PRN Nils Abbott MD        Or    ondansetron (ZOFRAN) injection 4 mg  4 mg IntraVENous Q6H PRN Nils Abbott MD        polyethylene glycol (GLYCOLAX) packet 17 g  17 g Oral Daily PRN Nils Abbott MD        acetaminophen (TYLENOL) tablet 650 mg  650 mg Oral Q6H PRN Nils Abbott MD        Or    acetaminophen (TYLENOL) suppository 650 mg  650 mg Rectal Q6H PRN Nils Abbott MD        oxyCODONE-acetaminophen (PERCOCET) 5-325 MG per tablet 1 tablet  1 tablet Oral Q4H PRN Jarett Najera MD   1 tablet at 02/23/23 0636    lactated ringers IV soln infusion   IntraVENous Continuous Nils Abbott  mL/hr at 02/23/23 0324 New Bag at 02/23/23 0324    HYDROmorphone (DILAUDID) injection 1 mg  1 mg IntraVENous Q4H PRN Nils Abbott MD   1 mg at 02/23/23 0321       Allergies:   Allergies   Allergen Reactions    Morphine Shortness Of Breath     Other reaction(s): Histamine-like Reaction  Has asthma exacerbation with morphine-histamine type reaction         Social History:  Social History     Socioeconomic History    Marital status: Single     Spouse name: Not on file    Number of children: 0    Years of education: Not on file    Highest education level: Not on file   Occupational History    Not on file   Tobacco Use    Smoking status: Never     Passive exposure: Never    Smokeless tobacco: Never   Vaping Use    Vaping Use: Never used   Substance and Sexual Activity    Alcohol use: Not Currently    Drug use: Never    Sexual activity: Not Currently     Partners: Female   Other Topics Concern    Not on file   Social History Narrative    Not on file     Social Determinants of Health     Financial Resource Strain: Not on file   Food Insecurity: Not on file   Transportation Needs: Not on file   Physical Activity: Not on file   Stress: Not on file   Social Connections: Not on file   Intimate Partner Violence: Not on file   Housing Stability: Not on file          Family History:  Family History   Problem Relation Age of Onset    Other Father         sarcoidosis       REVIEW OF SYSTEMS:      Constitutional: Denies fever, sweats, weight loss. Patient in excrutiating pain this AM with return of priapism. Eyes: No visual changes or diplopia. No scleral icterus. Ent: No headaches, hearing loss or vertigo. No mouth sores or sore throat. Cardiovascular: No chest pain, dyspnea on exertion, palpitations or loss of consciousness. Respiratory: No cough or wheezing, no sputum production. No hemoptysis. Gastrointestinal: No abdominal pain, appetite loss, blood in stools. No change in bowel habits. Genitourinary: Priapism. No dysuria, trouble voiding, or hematuria. Musculoskeletal: No generalized weakness. No joint complaints. Integumentary: No rash or pruritus. Neurological: No headache, diplopia. No change in gait, balance, or coordination. No paresthesias. Endocrine: No temperature intolerance.   No excessive thirst, fluid intake, urination. Hematologic/lymphatic: No abnormal bruising or ecchymosis, blood clots or swollen lymph nodes. Allergic/immunologic: No nasal congestion or hives. PHYSICAL EXAM:      Vitals:  Patient Vitals for the past 24 hrs:   BP Temp Temp src Pulse Resp SpO2 Height Weight   02/23/23 0636 -- -- -- -- 16 -- -- --   02/23/23 0351 -- -- -- -- 16 -- -- --   02/23/23 0321 -- -- -- -- 16 -- -- --   02/23/23 0127 -- -- -- -- 16 -- -- --   02/23/23 0057 -- -- -- -- 18 -- -- --   02/22/23 2347 -- -- -- -- 16 -- -- --   02/22/23 2317 -- -- -- -- 16 -- -- --   02/22/23 2040 -- -- -- -- 16 -- -- --   02/22/23 2006 (!) 93/54 97.8 °F (36.6 °C) Oral 89 18 -- -- --   02/22/23 1904 -- -- -- -- 18 -- -- --   02/22/23 1834 -- -- -- -- 16 -- -- --   02/22/23 1600 122/65 98 °F (36.7 °C) Oral 91 16 93 % -- --   02/22/23 1531 -- -- -- -- 18 -- -- --   02/22/23 1420 110/64 98.3 °F (36.8 °C) Oral 75 18 96 % 5' 9\" (1.753 m) 191 lb 3.2 oz (86.7 kg)   02/22/23 1313 (!) 96/48 -- -- 65 19 94 % -- --   02/22/23 1256 98/60 -- -- 66 19 94 % -- --   02/22/23 1242 (!) 92/49 -- -- 67 19 94 % -- --   02/22/23 1228 (!) 97/52 -- -- 76 20 95 % -- --   02/22/23 1212 (!) 95/56 -- -- 63 15 95 % -- --   02/22/23 1158 (!) 98/51 -- -- 68 13 94 % -- --   02/22/23 1142 (!) 103/59 -- -- 68 18 95 % -- --   02/22/23 1128 (!) 99/51 -- -- 66 19 95 % -- --   02/22/23 1112 110/78 -- -- 68 11 95 % -- --   02/22/23 1012 119/72 -- -- 68 21 96 % -- --   02/22/23 0906 119/72 -- -- 69 17 95 % -- --   02/22/23 0818 -- 98 °F (36.7 °C) -- -- -- -- -- --       Date 02/23/23 0000 - 02/23/23 2359   Shift 1150-2894 5100-3602 5673-2845 24 Hour Total   INTAKE   P.O. 480   480   Shift Total(mL/kg) 480(5.5)   480(5.5)   OUTPUT   Shift Total(mL/kg)       Weight (kg) 86.7 86.7 86.7 86.7     CONSTITUTIONAL: awake, alert. Patient on floor next to bed in excruciating pain due to priapism. Tearful.    EYES: pupils equal, round and reactive to light, sclera clear and conjunctiva normal  ENT: Normocephalic, without obvious abnormality, atraumatic  NECK: supple, symmetrical  HEMATOLOGIC/LYMPHATIC: no cervical, supraclavicular or axillary lymphadenopathy   MUSCULOSKELETAL: full range of motion noted, tone is normal  CV/Lungs: deferred due to patient in acute pain  GI: deferred due to acute pain  NEUROLOGIC: awake, alert, oriented to name, place and time. Motor skills grossly intact. SKIN: Normal skin color, texture, turgor and no jaundice. appears intact   EXTREMITIES: no LE edema. Priapism present. DATA:    PT/INR:    Recent Labs     02/23/23  0530 02/22/23  0813 02/18/23 0228 02/17/23  0305 02/13/23  0448   PROT 5.6* 6.8 6.2* 6.7 6.3*   INR  --   --   --  1.25*  --      PTT:    Recent Labs     02/17/23 0305   APTT 31.2       CMP:    Recent Labs     02/23/23  0530      K 3.9      CO2 24   BUN 7   PROT 5.6*     Mg:  No results for input(s): MG in the last 720 hours. Lab Results   Component Value Date    CALCIUM 8.8 02/23/2023    PHOS 4.5 10/20/2021       CBC:    Recent Labs     02/23/23  0530 02/22/23  0813 02/18/23 0228 02/17/23  0305 02/14/23  0738   WBC 9.7 13.8* 5.8 11.9* 15.0*   NEUTROABS 5.7 9.1* 3.5 9.2* 9.2*   LYMPHOPCT 36.0 24.0 23.2 13.4 26.0   RBC 1.93* 2.36* 2.09* 2.33* 2.46*   HGB 7.1* 8.4* 7.3* 8.3* 8.3*   HCT 20.5* 24.7* 20.5* 22.2* 23.3*   .3* 104.9* 97.9 95.1 94.8   MCH 36.8* 35.4* 35.1* 35.6* 33.7   MCHC 34.7 33.8 35.8 37.4* 35.5   RDW 26.4* 28.3* 24.7* 25.7* 22.9*    474* 402 415 384        LDH:No results for input(s): LDH in the last 720 hours.       Radiology Review:  XR CHEST PORTABLE  Narrative: EXAMINATION:  ONE XRAY VIEW OF THE CHEST    2/22/2023 8:14 am    COMPARISON:  02/17/2023 radiograph    HISTORY:  ORDERING SYSTEM PROVIDED HISTORY: Chest Discomfort  TECHNOLOGIST PROVIDED HISTORY:  Reason for exam:->Chest Discomfort  Reason for Exam: chest discomfort    FINDINGS:  The cardiac silhouette is prominent, likely magnified by portable technique. Mediastinum and pulmonary vascular markings are normal.  No acute airspace  abnormality. No significant skeletal finding. Impression: The lungs appear clear with no acute pulmonary finding. 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   - Dr. Lucas Diaz had discussion with patient and his mother at last admission about compliance with hydrea. He was agreeable to go back on hydrea 500mg once daily. - agree with IVF  - continue hydrea at prescribed outpatient  - pain control outpatient is oxycodone 10mg-20mg every 4 hrs PRN so will continue this inpatient with dilaudid PRN  - Priapism went away yesterday but has returned this morning. Urology consulted. - HGB 7.1 this morning  - Transfusion indicated if HGB <6 g/dL     Pain  - pain contract resigned 12/06/2022  - oxycodone is prescribed weekly because he will take them too quickly if it is not administered in this manner. 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        STONEY Deluca  Please Contact Through [employer]

## 2023-02-23 NOTE — H&P
Hospital Medicine History & Physical      PCP: Afshan Vasquez MD    Date of Admission: 2/22/2023    Chief Complaint:  Priapism      History Of Present Illness:      20 y.o. male who presents to the emergency department with worsening chest pain then when he was discharged from this hospital with a diagnosis of bilateral pulmonary emboli. He was admitted from February 17 and discharged on the 18th with Eliquis. He states his chest pain has never gone away and is actually worse today. He is also complaining of a persistent painful erection that started at 6 AM.  He has a history of sickle cell and has frequent hospitalizations and ED visits for priapism where he often has to have it detumesced. He is supposed be taking Hydrea and sildenafil but he is noncompliant. He has been referred to the sickle cell/priapism clinic at [facility] and he has yet to go there. He has been dealing with priapism issues for the last year. He states when he was about 7years old he had priapism issues that subsequently went away and he has not had any problems with this since up until about a year ago. He denies lightheadedness, dizziness, shortness of breath, leg swelling, nausea or vomiting. 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 Authorizing Provider   apixaban starter pack (ELIQUIS) 5 MG TBPK tablet Take 2 tablets by mouth 2 times daily for 7 days, THEN 1 tablet 2 times daily for 23 days.  Patient will take 2 tablets bid for 6.5 days as he had his first dose of Elqiuis in [facility]. 2/18/23 3/20/23  Kolby Robles MD   apixaban (ELIQUIS) 5 MG TABS tablet Take 1 tablet by mouth 2 times daily  Patient taking differently: No sig reported 2/18/23 3/20/23  Kolby Robles MD   hydroxyurea (HYDREA) 500 MG chemo capsule Take 2 capsules by mouth 2 times daily 2/14/23   Med Parker MD   pseudoephedrine (SUDAFED) 60 MG tablet Take 1 tablet by mouth daily as needed (priapism) 2/14/23   Med Parker MD   sildenafil (REVATIO) 20 MG tablet Take 1 tablet by mouth daily 10/24/22 11/23/22  CARMELITA Wilkinson CNP   folic acid (FOLVITE) 1 MG tablet Take 2 tablets by mouth daily  Patient not taking: Reported on 2/22/2023 9/16/21   CARMELITA Curry CNP   oxyCODONE HCl (OXY-IR) 10 MG immediate release tablet Take 20 mg by mouth every 4 hours as needed for Pain. Patient states he takes 20mg q 4 hours as needed    Historical Provider, MD   albuterol sulfate HFA (PROVENTIL;VENTOLIN;PROAIR) 108 (90 Base) MCG/ACT inhaler Albuterol HFA Inhaler 90 mcg/actuation  inhaled  2 puffs prn     Active    Historical Provider, MD   levalbuterol (XOPENEX) 0.31 MG/3ML nebulization Levalbuterol Nebulized    2 puffs prn     Active    Historical Provider, MD       Allergies:  Morphine    Social History:      TOBACCO:   reports that he has never smoked. He has never been exposed to tobacco smoke. He has never used smokeless tobacco.  ETOH:   reports that he does not currently use alcohol. E-cigarette/Vaping       Questions Responses    E-cigarette/Vaping Use Never User    Start Date     Passive Exposure No    Quit Date     Counseling Given Yes    Comments               Family History:     Reviewed and negative in regards to presenting illness/complaint. Problem Relation Age of Onset    Other Father         sarcoidosis       REVIEW OF SYSTEMS COMPLETED:   Pertinent positives as noted in the HPI. All other systems reviewed and negative.     PHYSICAL EXAM PERFORMED:    BP (!) 93/54 Pulse 89   Temp 97.8 °F (36.6 °C) (Oral)   Resp 16   Ht 5' 9\" (1.753 m)   Wt 191 lb 3.2 oz (86.7 kg)   SpO2 93%   BMI 28.24 kg/m²     General appearance:  No apparent distress, appears stated age and cooperative. HEENT:  Normal cephalic, atraumatic without obvious deformity. Pupils equal, round, and reactive to light. Extra ocular muscles intact. Conjunctivae/corneas clear. Neck: Supple, with full range of motion. No jugular venous distention. Trachea midline. Respiratory:  Normal respiratory effort. Clear to auscultation, bilaterally without Rales/Wheezes/Rhonchi. Cardiovascular:  Regular rate and rhythm with normal S1/S2 without murmurs, rubs or gallops. Abdomen: Soft, non-tender, non-distended with normal bowel sounds. Musculoskeletal:  No clubbing, cyanosis or edema bilaterally. Full range of motion without deformity. Skin: Skin color, texture, turgor normal.  No rashes or lesions. Neurologic:  Neurovascularly intact without any focal sensory/motor deficits. Cranial nerves: II-XII intact, grossly non-focal.  Psychiatric:  Alert and oriented, thought content appropriate, normal insight  Capillary Refill: Brisk,3 seconds, normal  Peripheral Pulses: +2 palpable, equal bilaterally       Labs:     Recent Labs     02/22/23  0813   WBC 13.8*   HGB 8.4*   HCT 24.7*   *     Recent Labs     02/22/23  0813      K 4.1      CO2 22   BUN 8   CREATININE 0.7*   CALCIUM 9.6     Recent Labs     02/22/23  0813   AST 47*   ALT 31   BILITOT 5.5*   ALKPHOS 79     No results for input(s): INR in the last 72 hours.   Recent Labs     02/22/23  0813   TROPONINI <0.01       Urinalysis:      Lab Results   Component Value Date/Time    NITRU Negative 02/22/2023 12:14 PM    WBCUA 0-2 02/22/2023 12:14 PM    BACTERIA 1+ 02/22/2023 12:14 PM    RBCUA 3-4 02/22/2023 12:14 PM    BLOODU MODERATE 02/22/2023 12:14 PM    SPECGRAV 1.010 02/22/2023 12:14 PM    GLUCOSEU Negative 02/22/2023 12:14 PM       Radiology: CXR: I have reviewed the CXR   EKG:  I have reviewed the EKG     XR CHEST PORTABLE   Final Result   The lungs appear clear with no acute pulmonary finding. Consults:    PHARMACY TO DOSE MEDICATION  IP CONSULT TO HOSPITALIST  IP CONSULT TO ONCOLOGY    ASSESSMENT:    Active Hospital Problems    Diagnosis Date Noted    Sickle cell crisis ([facility] [address] [D57.00] 03/25/2022     -Priapism  - Sickle cell crisis  - Leukocytosis  - History of DVT  - Asthma        PLAN:    Admit patient to MedSurg unit  Aggressive IV hydration  Pain control with Percocet and IV morphine as needed  Trend WBC  Continue Eliquis  Continue albuterol/ipratropium bronchodilator as needed  Obtain hematology consultation    DVT Prophylaxis: Eliquis  Diet: ADULT DIET; Regular  Code Status: Full Code    PT/OT Eval Status:     Dispo - Pending clinical improvement        Brody Jeff MD    Thank you Anderson Ambrosio MD for the opportunity to be involved in this patient's care. If you have any questions or concerns please feel free to contact me at [phone].

## 2023-02-23 NOTE — PLAN OF CARE
Problem: Pain  Goal: Verbalizes/displays adequate comfort level or baseline comfort level  Flowsheets (Taken 2/23/2023 1254)  Verbalizes/displays adequate comfort level or baseline comfort level:   Encourage patient to monitor pain and request assistance   Assess pain using appropriate pain scale   Administer analgesics based on type and severity of pain and evaluate response   Implement non-pharmacological measures as appropriate and evaluate response     Problem: Safety - Adult  Goal: Free from fall injury  Flowsheets (Taken 2/23/2023 1259)  Free From Fall Injury:   Instruct family/caregiver on patient safety   Based on caregiver fall risk screen, instruct family/caregiver to ask for assistance with transferring infant if caregiver noted to have fall risk factors

## 2023-02-23 NOTE — PROGRESS NOTES
PRN 0.5mg IV Dilaudid was pulled from another patients omnicell profile by accident and administered to patient as a underdose of patients ordered 1mg IV Dilaudid q4 hour PRN, charge RN and pharmacist notified. Patients correct dose of 1mg IV Dilaudid was pulled from omnicell and patient received an additional 0.5mg IV Dilaudid which totals the correct dose of 1 mg IV Dilaudid per prn orders. Please see Mar for administration. Sal yang RN witnessed 0.5mg IV Dilaudid waste and administration of corrective dose to the patient at bedside.

## 2023-02-23 NOTE — PROGRESS NOTES
Consult Call Back    Who:Annalisa Del Rosario [facility]   Date:2/23/2023,  Time:9:21 AM    Electronically signed by Sean Stovall on 2/23/23 at 9:21 AM EST

## 2023-02-23 NOTE — CONSULTS
Urology Consult Note      Reason for Consultation: priapism    Chief Complaint: \"priapsim\"  HPI:  Gordy Clemente is a 20 y.o. male with SSD and recurrent ischemic priapism. He reports erection since 0730 this morning and he has pain. This has been a recurrent issue in the past treated with phenylephrine injections and he is requesting this be implemented now. See prior consult notes, he has been non compliant with follow up, previously recommended sildenafil daily which he has not been taking.       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)     Sickle cell anemia (HCC)        Past Surgical History:   Procedure Laterality Date    ABSCESS DRAINAGE Left 2012    leg    SPLENECTOMY         Medication List reviewed:     Current Facility-Administered Medications   Medication Dose Route Frequency Provider Last Rate Last Admin    oxyCODONE (ROXICODONE) immediate release tablet 10 mg  10 mg Oral Q4H PRN STONEY Hollins   10 mg at 02/23/23 1044    phenylephrine (STEVE-SYNEPHRINE) 100 mcg/mL injection (FOR PRIAPISM)  500 mcg IntraCAVernosal Q5 Min PRN Ruth Snell PA-C        lidocaine 2 % injection 5 mL  5 mL IntraDERmal Once Annalisa Snell PA-C        HYDROmorphone (DILAUDID) tablet 1 mg  1 mg Oral Once Lina Srivastava MD        apixaban [facility]) tablet 5 mg  5 mg Oral BID Nils Abbott MD   5 mg at 69/51/09 0119    folic acid (FOLVITE) tablet 2 mg  2 mg Oral Daily Nils Abbott MD   2 mg at 02/23/23 0815    hydroxyurea (HYDREA) chemo capsule 1,000 mg  1,000 mg Oral BID Nils Abbott MD   1,000 mg at 02/23/23 0815    sodium chloride flush 0.9 % injection 5-40 mL  5-40 mL IntraVENous 2 times per day iNls Abbott MD   10 mL at 02/23/23 0815    sodium chloride flush 0.9 % injection 5-40 mL  5-40 mL IntraVENous PRN Nils Abbott MD        0.9 % sodium chloride infusion   IntraVENous PRN Nils Abbott MD        ondansetron (ZOFRAN-ODT) disintegrating tablet 4 mg  4 mg Oral Q8H PRN Nils Abbott MD        Or    ondansetron (ZOFRAN) injection 4 mg  4 mg IntraVENous Q6H PRN Nils Abbott MD        polyethylene glycol (GLYCOLAX) packet 17 g  17 g Oral Daily PRN Nils Abbott MD        acetaminophen (TYLENOL) tablet 650 mg  650 mg Oral Q6H PRN Nils Abbott MD        Or    acetaminophen (TYLENOL) suppository 650 mg  650 mg Rectal Q6H PRN Nils Abbott MD        lactated ringers IV soln infusion   IntraVENous Continuous Nils Abbott  mL/hr at 23 1048 New Bag at 23 1048    HYDROmorphone (DILAUDID) injection 1 mg  1 mg IntraVENous Q4H PRN Nils Abbott MD   1 mg at 23 0807       Allergies   Allergen Reactions    Morphine Shortness Of Breath     Other reaction(s): Histamine-like Reaction  Has asthma exacerbation with morphine-histamine type reaction         Family History   Problem Relation Age of Onset    Other Father         sarcoidosis       Social History     Tobacco Use    Smoking status: Never     Passive exposure: Never    Smokeless tobacco: Never   Vaping Use    Vaping Use: Never used   Substance Use Topics    Alcohol use: Not Currently    Drug use: Never         Review of Systems: A 12 point ROS was performed and was unremarkable unless listed in the history of present illness. I/O last 3 completed shifts: In: 7 [P.O.:1628; I.V.:392.7]  Out: -     Vitals:  /70   Pulse 68   Temp 97.4 °F (36.3 °C) (Oral)   Resp 18   Ht 5' 9\" (1.753 m)   Wt 191 lb 3.2 oz (86.7 kg)   SpO2 94%   BMI 28.24 kg/m²   Temp  Av.9 °F (36.6 °C)  Min: 97.4 °F (36.3 °C)  Max: 98.3 °F (36.8 °C)    Intake/Output Summary (Last 24 hours) at 2023 1210  Last data filed at 2023 0058  Gross per 24 hour   Intake 1420.65 ml   Output --   Net 1420.65 ml         Physical:  Well developed, well nourished in no acute distress  Mood indicates no abnormalities.  Pt doesnt appear depressed  Orientated to time and place     Male :  Penis is tender and rigid   Urethral meatus is normal in size and location  Scrotum appears normal and both testicles appear normal in size and location        Labs:  WBC:    Lab Results   Component Value Date/Time    WBC 9.7 02/23/2023 05:30 AM     Hemoglobin/Hematocrit:    Lab Results   Component Value Date/Time    HGB 7.1 02/23/2023 05:30 AM    HCT 20.5 02/23/2023 05:30 AM     BMP:    Lab Results   Component Value Date/Time     02/23/2023 05:30 AM    K 3.9 02/23/2023 05:30 AM     02/23/2023 05:30 AM    CO2 24 02/23/2023 05:30 AM    BUN 7 02/23/2023 05:30 AM    LABALBU 3.7 02/23/2023 05:30 AM    CREATININE 0.6 02/23/2023 05:30 AM    CALCIUM 8.8 02/23/2023 05:30 AM    GFRAA >60 08/29/2022 02:12 AM    LABGLOM >60 02/23/2023 05:30 AM     PT/INR:    Lab Results   Component Value Date/Time    PROTIME 15.5 02/17/2023 03:05 AM    INR 1.25 02/17/2023 03:05 AM     PTT:    Lab Results   Component Value Date/Time    APTT 31.2 02/17/2023 03:05 AM   [APTT      Impression/Plan:     Recurrent pripaism  Sickle cell disease  - Dr Carl Johnson to perform intracavernosal injection of phenylephrine at bedside       Fawn Bowen PA-C  2/23/2023

## 2023-02-23 NOTE — PROGRESS NOTES
Hospitalist Progress Note      PCP: Jaqueline Sexton MD    Date of Admission: 2/22/2023    Chief Complaint:  Priapism        History Of Present Illness:       20 y.o. male who presents to the emergency department with worsening chest pain then when he was discharged from this hospital with a diagnosis of bilateral pulmonary emboli. He was admitted from February 17 and discharged on the 18th with Eliquis. He states his chest pain has never gone away and is actually worse today. He is also complaining of a persistent painful erection that started at 6 AM.  He has a history of sickle cell and has frequent hospitalizations and ED visits for priapism where he often has to have it detumesced. He is supposed be taking Hydrea and sildenafil but he is noncompliant. He has been referred to the sickle cell/priapism clinic at [facility] and he has yet to go there. He has been dealing with priapism issues for the last year. He states when he was about 7years old he had priapism issues that subsequently went away and he has not had any problems with this since up until about a year ago. He denies lightheadedness, dizziness, shortness of breath, leg swelling, nausea or vomiting. Subjective:   Pt c/o priapism this am. He has severe pain. Urology to perform intracavernosal injection of phenylephrine.       Medications:  Reviewed    Infusion Medications    sodium chloride      lactated ringers IV soln 150 mL/hr at 02/23/23 1048     Scheduled Medications    lidocaine  5 mL IntraDERmal Once    HYDROmorphone  1 mg Oral Once    apixaban  5 mg Oral BID    folic acid  2 mg Oral Daily    hydroxyurea  1,000 mg Oral BID    sodium chloride flush  5-40 mL IntraVENous 2 times per day     PRN Meds: oxyCODONE, phenylephrine, sodium chloride flush, sodium chloride, ondansetron **OR** ondansetron, polyethylene glycol, acetaminophen **OR** acetaminophen, HYDROmorphone      Intake/Output Summary (Last 24 hours) at 2/23/2023 1431  Last data filed at 2/23/2023 1234  Gross per 24 hour   Intake 1420.65 ml   Output 850 ml   Net 570.65 ml       Physical Exam Performed:    /70   Pulse 68   Temp 97.4 °F (36.3 °C) (Oral)   Resp 16   Ht 5' 9\" (1.753 m)   Wt 191 lb 3.2 oz (86.7 kg)   SpO2 94%   BMI 28.24 kg/m²     General appearance: No apparent distress, appears stated age and cooperative. HEENT: Pupils equal, round, and reactive to light. Conjunctivae/corneas clear. Neck: Supple, with full range of motion. No jugular venous distention. Trachea midline. Respiratory:  Normal respiratory effort. Clear to auscultation, bilaterally without Rales/Wheezes/Rhonchi. Cardiovascular: Regular rate and rhythm with normal S1/S2 without murmurs, rubs or gallops. Abdomen: Soft, non-tender, non-distended with normal bowel sounds. Musculoskeletal: No clubbing, cyanosis or edema bilaterally. Full range of motion without deformity. Skin: Skin color, texture, turgor normal.  No rashes or lesions. Neurologic:  Neurovascularly intact without any focal sensory/motor deficits. Cranial nerves: II-XII intact, grossly non-focal.  Psychiatric: Alert and oriented, thought content appropriate, normal insight  Capillary Refill: Brisk, 3 seconds, normal   Peripheral Pulses: +2 palpable, equal bilaterally       Labs:   Recent Labs     02/22/23  0813 02/23/23  0530   WBC 13.8* 9.7   HGB 8.4* 7.1*   HCT 24.7* 20.5*   * 391     Recent Labs     02/22/23  0813 02/23/23  0530    137   K 4.1 3.9    105   CO2 22 24   BUN 8 7   CREATININE 0.7* 0.6*   CALCIUM 9.6 8.8     Recent Labs     02/22/23  0813 02/23/23  0530   AST 47* 47*   ALT 31 34   BILITOT 5.5* 3.2*   ALKPHOS 79 60     No results for input(s): INR in the last 72 hours.   Recent Labs     02/22/23 0813   TROPONINI <0.01       Urinalysis:      Lab Results   Component Value Date/Time    NITRU Negative 02/22/2023 12:14 PM    WBCUA 0-2 02/22/2023 12:14 PM    BACTERIA 1+ 02/22/2023 12:14 PM    RBCUA 3-4 02/22/2023 12:14 PM    BLOODU MODERATE 02/22/2023 12:14 PM    SPECGRAV 1.010 02/22/2023 12:14 PM    GLUCOSEU Negative 02/22/2023 12:14 PM       Radiology:  XR CHEST PORTABLE   Final Result   The lungs appear clear with no acute pulmonary finding. PHARMACY TO DOSE MEDICATION  IP CONSULT TO HOSPITALIST  IP CONSULT TO ONCOLOGY  IP CONSULT TO UROLOGY    Assessment/Plan:    Active Hospital Problems    Diagnosis     Sickle cell crisis ([facility] [address] [D57.00]      -Priapism: s/p intracavernosal injection of phenylephrine   - Sickle cell crisis  - Leukocytosis  - History of DVT  - Asthma           PLAN:     Aggressive IV hydration  Pain control with Percocet and IV morphine as needed  Trend WBC  Continue Eliquis  Continue albuterol/ipratropium bronchodilator as needed  Obtain hematology consultation  Obtain urology consult for intracavernosal injection of phenylephrine      DVT Prophylaxis: Eliquis  Diet: ADULT DIET;  Regular  Code Status: Full Code     PT/OT Eval Status:      Dispo - Pending clinical improvement          Prabhjot Rosas MD

## 2023-02-24 LAB
A/G RATIO: 1.5 (ref 1.1–2.2)
ALBUMIN SERPL-MCNC: 3.5 G/DL (ref 3.4–5)
ALP BLD-CCNC: 68 U/L (ref 40–129)
ALT SERPL-CCNC: 33 U/L (ref 10–40)
ANION GAP SERPL CALCULATED.3IONS-SCNC: 9 MMOL/L (ref 3–16)
AST SERPL-CCNC: 40 U/L (ref 15–37)
BASOPHILS ABSOLUTE: 0.1 K/UL (ref 0–0.2)
BASOPHILS RELATIVE PERCENT: 0.9 %
BILIRUB SERPL-MCNC: 3.6 MG/DL (ref 0–1)
BUN BLDV-MCNC: 7 MG/DL (ref 7–20)
CALCIUM SERPL-MCNC: 9.3 MG/DL (ref 8.3–10.6)
CHLORIDE BLD-SCNC: 105 MMOL/L (ref 99–110)
CO2: 24 MMOL/L (ref 21–32)
CREAT SERPL-MCNC: 0.7 MG/DL (ref 0.9–1.3)
EOSINOPHILS ABSOLUTE: 0.2 K/UL (ref 0–0.6)
EOSINOPHILS RELATIVE PERCENT: 2.2 %
GFR SERPL CREATININE-BSD FRML MDRD: >60 ML/MIN/{1.73_M2}
GLUCOSE BLD-MCNC: 92 MG/DL (ref 70–99)
HCT VFR BLD CALC: 22.8 % (ref 40.5–52.5)
HEMATOLOGY PATH CONSULT: NO
HEMOGLOBIN: 7.8 G/DL (ref 13.5–17.5)
LYMPHOCYTES ABSOLUTE: 3.7 K/UL (ref 1–5.1)
LYMPHOCYTES RELATIVE PERCENT: 34 %
MCH RBC QN AUTO: 35.4 PG (ref 26–34)
MCHC RBC AUTO-ENTMCNC: 34.2 G/DL (ref 31–36)
MCV RBC AUTO: 103.6 FL (ref 80–100)
MONOCYTES ABSOLUTE: 1.1 K/UL (ref 0–1.3)
MONOCYTES RELATIVE PERCENT: 10 %
NEUTROPHILS ABSOLUTE: 5.8 K/UL (ref 1.7–7.7)
NEUTROPHILS RELATIVE PERCENT: 52.9 %
PDW BLD-RTO: 23.3 % (ref 12.4–15.4)
PLATELET # BLD: 435 K/UL (ref 135–450)
PLATELET SLIDE REVIEW: ADEQUATE
PMV BLD AUTO: 8.3 FL (ref 5–10.5)
POTASSIUM REFLEX MAGNESIUM: 4 MMOL/L (ref 3.5–5.1)
RBC # BLD: 2.2 M/UL (ref 4.2–5.9)
REASON FOR REJECTION: NORMAL
REJECTED TEST: NORMAL
SLIDE REVIEW: ABNORMAL
SODIUM BLD-SCNC: 138 MMOL/L (ref 136–145)
TOTAL PROTEIN: 5.8 G/DL (ref 6.4–8.2)
WBC # BLD: 11 K/UL (ref 4–11)

## 2023-02-24 PROCEDURE — 36415 COLL VENOUS BLD VENIPUNCTURE: CPT

## 2023-02-24 PROCEDURE — 6370000000 HC RX 637 (ALT 250 FOR IP)

## 2023-02-24 PROCEDURE — 6370000000 HC RX 637 (ALT 250 FOR IP): Performed by: INTERNAL MEDICINE

## 2023-02-24 PROCEDURE — 1200000000 HC SEMI PRIVATE

## 2023-02-24 PROCEDURE — 6360000002 HC RX W HCPCS: Performed by: INTERNAL MEDICINE

## 2023-02-24 PROCEDURE — 80053 COMPREHEN METABOLIC PANEL: CPT

## 2023-02-24 PROCEDURE — 85025 COMPLETE CBC W/AUTO DIFF WBC: CPT

## 2023-02-24 PROCEDURE — 2580000003 HC RX 258: Performed by: INTERNAL MEDICINE

## 2023-02-24 RX ADMIN — HYDROMORPHONE HYDROCHLORIDE 1 MG: 1 INJECTION, SOLUTION INTRAMUSCULAR; INTRAVENOUS; SUBCUTANEOUS at 08:52

## 2023-02-24 RX ADMIN — SODIUM CHLORIDE, POTASSIUM CHLORIDE, SODIUM LACTATE AND CALCIUM CHLORIDE: 600; 310; 30; 20 INJECTION, SOLUTION INTRAVENOUS at 13:24

## 2023-02-24 RX ADMIN — APIXABAN 5 MG: 5 TABLET, FILM COATED ORAL at 20:21

## 2023-02-24 RX ADMIN — HYDROMORPHONE HYDROCHLORIDE 1 MG: 1 INJECTION, SOLUTION INTRAMUSCULAR; INTRAVENOUS; SUBCUTANEOUS at 21:41

## 2023-02-24 RX ADMIN — SODIUM CHLORIDE, POTASSIUM CHLORIDE, SODIUM LACTATE AND CALCIUM CHLORIDE: 600; 310; 30; 20 INJECTION, SOLUTION INTRAVENOUS at 20:25

## 2023-02-24 RX ADMIN — OXYCODONE 10 MG: 5 TABLET ORAL at 20:21

## 2023-02-24 RX ADMIN — HYDROMORPHONE HYDROCHLORIDE 1 MG: 1 INJECTION, SOLUTION INTRAMUSCULAR; INTRAVENOUS; SUBCUTANEOUS at 17:41

## 2023-02-24 RX ADMIN — Medication 10 ML: at 08:52

## 2023-02-24 RX ADMIN — APIXABAN 5 MG: 5 TABLET, FILM COATED ORAL at 08:52

## 2023-02-24 RX ADMIN — OXYCODONE 10 MG: 5 TABLET ORAL at 05:47

## 2023-02-24 RX ADMIN — FOLIC ACID 2 MG: 1 TABLET ORAL at 08:51

## 2023-02-24 RX ADMIN — OXYCODONE 10 MG: 5 TABLET ORAL at 11:18

## 2023-02-24 RX ADMIN — OXYCODONE 10 MG: 5 TABLET ORAL at 15:40

## 2023-02-24 RX ADMIN — HYDROMORPHONE HYDROCHLORIDE 1 MG: 1 INJECTION, SOLUTION INTRAMUSCULAR; INTRAVENOUS; SUBCUTANEOUS at 13:24

## 2023-02-24 RX ADMIN — HYDROMORPHONE HYDROCHLORIDE 1 MG: 1 INJECTION, SOLUTION INTRAMUSCULAR; INTRAVENOUS; SUBCUTANEOUS at 04:49

## 2023-02-24 RX ADMIN — HYDROXYUREA 1000 MG: 500 CAPSULE ORAL at 21:45

## 2023-02-24 RX ADMIN — HYDROMORPHONE HYDROCHLORIDE 1 MG: 1 INJECTION, SOLUTION INTRAMUSCULAR; INTRAVENOUS; SUBCUTANEOUS at 00:37

## 2023-02-24 RX ADMIN — OXYCODONE 10 MG: 5 TABLET ORAL at 01:42

## 2023-02-24 RX ADMIN — HYDROXYUREA 1000 MG: 500 CAPSULE ORAL at 08:52

## 2023-02-24 ASSESSMENT — PAIN DESCRIPTION - DESCRIPTORS
DESCRIPTORS: ACHING;SORE
DESCRIPTORS: SHARP
DESCRIPTORS: ACHING;DISCOMFORT;SORE
DESCRIPTORS: ACHING
DESCRIPTORS: SORE;ACHING;DISCOMFORT
DESCRIPTORS: SHARP
DESCRIPTORS: SHARP

## 2023-02-24 ASSESSMENT — PAIN SCALES - GENERAL
PAINLEVEL_OUTOF10: 8
PAINLEVEL_OUTOF10: 8
PAINLEVEL_OUTOF10: 7
PAINLEVEL_OUTOF10: 10
PAINLEVEL_OUTOF10: 8
PAINLEVEL_OUTOF10: 7
PAINLEVEL_OUTOF10: 8
PAINLEVEL_OUTOF10: 8
PAINLEVEL_OUTOF10: 7
PAINLEVEL_OUTOF10: 8

## 2023-02-24 ASSESSMENT — PAIN - FUNCTIONAL ASSESSMENT
PAIN_FUNCTIONAL_ASSESSMENT: ACTIVITIES ARE NOT PREVENTED
PAIN_FUNCTIONAL_ASSESSMENT: PREVENTS OR INTERFERES SOME ACTIVE ACTIVITIES AND ADLS

## 2023-02-24 ASSESSMENT — PAIN DESCRIPTION - LOCATION
LOCATION: GENERALIZED
LOCATION: CHEST
LOCATION: CHEST
LOCATION: PENIS
LOCATION: GENERALIZED
LOCATION: PENIS
LOCATION: PENIS
LOCATION: BACK
LOCATION: PENIS

## 2023-02-24 ASSESSMENT — PAIN DESCRIPTION - ORIENTATION
ORIENTATION: ANTERIOR;LOWER
ORIENTATION: LOWER;UPPER
ORIENTATION: ANTERIOR;LOWER
ORIENTATION: ANTERIOR;LOWER
ORIENTATION: MID
ORIENTATION: ANTERIOR;LOWER
ORIENTATION: MID

## 2023-02-24 NOTE — PLAN OF CARE
Problem: Pain  Goal: Verbalizes/displays adequate comfort level or baseline comfort level  2/23/2023 2337 by Rama Gaona RN  Flowsheets (Taken 2/23/2023 2337)  Verbalizes/displays adequate comfort level or baseline comfort level:   Encourage patient to monitor pain and request assistance   Assess pain using appropriate pain scale   Administer analgesics based on type and severity of pain and evaluate response   Implement non-pharmacological measures as appropriate and evaluate response  2/23/2023 1254 by Shauna Deutsch RN  Flowsheets (Taken 2/23/2023 1254)  Verbalizes/displays adequate comfort level or baseline comfort level:   Encourage patient to monitor pain and request assistance   Assess pain using appropriate pain scale   Administer analgesics based on type and severity of pain and evaluate response   Implement non-pharmacological measures as appropriate and evaluate response

## 2023-02-24 NOTE — PROGRESS NOTES
Hospitalist Progress Note      PCP: Gwen García MD    Date of Admission: 2/22/2023    Chief Complaint:  Priapism        History Of Present Illness:       20 y.o. male who presents to the emergency department with worsening chest pain then when he was discharged from this hospital with a diagnosis of bilateral pulmonary emboli. He was admitted from February 17 and discharged on the 18th with Eliquis. He states his chest pain has never gone away and is actually worse today. He is also complaining of a persistent painful erection that started at 6 AM.  He has a history of sickle cell and has frequent hospitalizations and ED visits for priapism where he often has to have it detumesced. He is supposed be taking Hydrea and sildenafil but he is noncompliant. He has been referred to the sickle cell/priapism clinic at [facility] and he has yet to go there. He has been dealing with priapism issues for the last year. He states when he was about 7years old he had priapism issues that subsequently went away and he has not had any problems with this since up until about a year ago. He denies lightheadedness, dizziness, shortness of breath, leg swelling, nausea or vomiting. S/p intracavernosal injection of phenylephrine. Subjective:   C/o chest pain. Priapism improved.        Medications:  Reviewed    Infusion Medications    sodium chloride      lactated ringers IV soln 150 mL/hr at 02/24/23 1324     Scheduled Medications    lidocaine  5 mL IntraDERmal Once    HYDROmorphone  1 mg Oral Once    apixaban  5 mg Oral BID    folic acid  2 mg Oral Daily    hydroxyurea  1,000 mg Oral BID    sodium chloride flush  5-40 mL IntraVENous 2 times per day     PRN Meds: oxyCODONE, phenylephrine, sodium chloride flush, sodium chloride, ondansetron **OR** ondansetron, polyethylene glycol, acetaminophen **OR** acetaminophen, HYDROmorphone      Intake/Output Summary (Last 24 hours) at 2/24/2023 1331  Last data filed at 2/24/2023 0040  Gross per 24 hour   Intake --   Output 1900 ml   Net -1900 ml       Physical Exam Performed:    /70   Pulse 66   Temp 98.4 °F (36.9 °C) (Oral)   Resp 18   Ht 5' 9\" (1.753 m)   Wt 191 lb 3.2 oz (86.7 kg)   SpO2 95%   BMI 28.24 kg/m²     General appearance: No apparent distress, appears stated age and cooperative. HEENT: Pupils equal, round, and reactive to light. Conjunctivae/corneas clear. Neck: Supple, with full range of motion. No jugular venous distention. Trachea midline. Respiratory:  Normal respiratory effort. Clear to auscultation, bilaterally without Rales/Wheezes/Rhonchi. Cardiovascular: Regular rate and rhythm with normal S1/S2 without murmurs, rubs or gallops. Abdomen: Soft, non-tender, non-distended with normal bowel sounds. Musculoskeletal: No clubbing, cyanosis or edema bilaterally. Full range of motion without deformity. Skin: Skin color, texture, turgor normal.  No rashes or lesions. Neurologic:  Neurovascularly intact without any focal sensory/motor deficits. Cranial nerves: II-XII intact, grossly non-focal.  Psychiatric: Alert and oriented, thought content appropriate, normal insight  Capillary Refill: Brisk, 3 seconds, normal   Peripheral Pulses: +2 palpable, equal bilaterally       Labs:   Recent Labs     02/22/23  0813 02/23/23  0530 02/24/23  0903   WBC 13.8* 9.7 11.0   HGB 8.4* 7.1* 7.8*   HCT 24.7* 20.5* 22.8*   * 391 435     Recent Labs     02/22/23  0813 02/23/23  0530 02/24/23  0533    137 138   K 4.1 3.9 4.0    105 105   CO2 22 24 24   BUN 8 7 7   CREATININE 0.7* 0.6* 0.7*   CALCIUM 9.6 8.8 9.3     Recent Labs     02/22/23  0813 02/23/23  0530 02/24/23  0533   AST 47* 47* 40*   ALT 31 34 33   BILITOT 5.5* 3.2* 3.6*   ALKPHOS 79 60 68     No results for input(s): INR in the last 72 hours.   Recent Labs     02/22/23  0813   TROPONINI <0.01       Urinalysis:      Lab Results   Component Value Date/Time    NITRU Negative 02/22/2023 12:14 PM    WBCUA 0-2 02/22/2023 12:14 PM    BACTERIA 1+ 02/22/2023 12:14 PM    RBCUA 3-4 02/22/2023 12:14 PM    BLOODU MODERATE 02/22/2023 12:14 PM    SPECGRAV 1.010 02/22/2023 12:14 PM    GLUCOSEU Negative 02/22/2023 12:14 PM       Radiology:  XR CHEST PORTABLE   Final Result   The lungs appear clear with no acute pulmonary finding. PHARMACY TO DOSE MEDICATION  IP CONSULT TO HOSPITALIST  IP CONSULT TO ONCOLOGY  IP CONSULT TO UROLOGY    Assessment/Plan:    Active Hospital Problems    Diagnosis     Sickle cell crisis ([facility] [address] [D57.00]      -Priapism: s/p intracavernosal injection of phenylephrine   - Sickle cell crisis  - Leukocytosis  - History of DVT  - Asthma           PLAN:     Aggressive IV hydration  Pain control with Percocet and IV morphine as needed  Trend WBC  Continue Eliquis  Continue albuterol/ipratropium bronchodilator as needed  Obtain hematology consultation  Obtain urology consult for intracavernosal injection of phenylephrine      DVT Prophylaxis: Eliquis  Diet: ADULT DIET;  Regular  Code Status: Full Code     PT/OT Eval Status:      Dispo - Pending clinical improvement          Brant Lim MD

## 2023-02-24 NOTE — ONCOLOGY
ONCOLOGY HEMATOLOGY CARE PROGRESS NOTE      SUBJECTIVE:    Room air. No fever. Ambulatory. No priapism. ROS:     Constitutional:  No weight loss, No fever, No chills, No night sweats. Energy level good.   Eyes:  No impairment or change in vision  ENT / Mouth:  No pain, abnormal ulceration, bleeding, nasal drip or change in voice or hearing  Cardiovascular:  No chest pain, palpitations, new edema, or calf discomfort  Respiratory:  No pain, hemoptysis, change to breathing  Breast:  No pain, discharge, change in appearance or texture  Gastrointestinal:  No pain, cramping, jaundice, change to eating and bowel habits  Urinary:  No pain, bleeding or change in continence  Genitalia: No pain, bleeding or discharge  Musculoskeletal:  No redness, pain, edema or weakness  Skin:  No pruritus, rash, change to nodules or lesions  Neurologic:  No discomfort, change in mental status, speech, sensory or motor activity  Psychiatric:  No change in concentration or change to affect or mood  Endocrine:  No hot flashes, increased thirst, or change to urine production  Hematologic: No petechiae, ecchymosis or bleeding  Lymphatic:  No lymphadenopathy or lymphedema  Allergy / Immunologic:  No eczema, hives, frequent or recurrent infections    OBJECTIVE        Physical    VITALS:  Patient Vitals for the past 24 hrs:   BP Temp Temp src Pulse Resp SpO2   02/24/23 0547 -- -- -- -- 16 --   02/24/23 0449 -- -- -- -- 16 --   02/24/23 0212 -- -- -- -- 16 --   02/24/23 0142 -- -- -- -- 16 --   02/24/23 0107 -- -- -- -- 16 --   02/24/23 0034 119/65 98.4 °F (36.9 °C) Oral 78 16 95 %   02/23/23 2218 -- -- -- -- 16 --   02/23/23 2109 -- -- -- -- 16 --   02/23/23 2039 -- -- -- -- 16 --   02/23/23 2007 116/69 98.8 °F (37.1 °C) Oral 77 18 --   02/23/23 1642 121/76 98.1 °F (36.7 °C) Oral 70 18 95 %   02/23/23 1500 -- -- -- -- 16 --   02/23/23 1304 -- -- -- -- 16 --   02/23/23 1234 -- -- -- -- 18 --   02/23/23 1114 -- -- -- -- 18 --   02/23/23 1044 -- -- -- -- 18 --   02/23/23 0837 -- -- -- -- 18 --   02/23/23 0807 115/70 97.4 °F (36.3 °C) Oral 68 18 94 %   02/23/23 0636 -- -- -- -- 16 --       24HR INTAKE/OUTPUT:    Intake/Output Summary (Last 24 hours) at 2/24/2023 2699  Last data filed at 2/24/2023 0040  Gross per 24 hour   Intake --   Output 2750 ml   Net -2750 ml       CONSTITUTIONAL: awake, alert, cooperative, no apparent distress   EYES: pupils equal, round and reactive to light, sclera clear and conjunctiva normal  ENT: Normocephalic, without obvious abnormality, atraumatic  NECK: supple, symmetrical, no jugular venous distension and no carotid bruits   HEMATOLOGIC/LYMPHATIC: no cervical, supraclavicular or axillary lymphadenopathy   LUNGS: no increased work of breathing and clear to auscultation   CARDIOVASCULAR: regular rate and rhythm, normal S1 and S2, no murmur noted  ABDOMEN: normal bowel sounds x 4, soft, non-distended, non-tender, no masses palpated, no hepatosplenomgaly   MUSCULOSKELETAL: full range of motion noted, tone is normal  NEUROLOGIC: awake, alert, oriented to name, place and time. Motor skills grossly intact. SKIN: Normal skin color, texture, turgor and no jaundice.  appears intact   EXTREMITIES: no LE edema     DATA:  CBC:    Recent Labs     02/23/23  0530 02/22/23  0813 02/18/23  0228 02/17/23  0305   WBC 9.7 13.8* 5.8 11.9*   NEUTROABS 5.7 9.1* 3.5 9.2*   LYMPHOPCT 36.0 24.0 23.2 13.4   RBC 1.93* 2.36* 2.09* 2.33*   HGB 7.1* 8.4* 7.3* 8.3*   HCT 20.5* 24.7* 20.5* 22.2*   .3* 104.9* 97.9 95.1   MCH 36.8* 35.4* 35.1* 35.6*   MCHC 34.7 33.8 35.8 37.4*   RDW 26.4* 28.3* 24.7* 25.7*    474* 402 415       PT/INR:    Recent Labs     02/24/23  0533 02/23/23  0530 02/22/23  0813 02/18/23 0228 02/17/23  0305   PROT 5.8* 5.6* 6.8 6.2* 6.7   INR  --   --   --   --  1.25*     PTT:    Recent Labs     02/17/23  0305   APTT 31.2       CMP:    Recent Labs     02/24/23  0533 02/23/23  0530 02/22/23  0813 02/18/23  0228    137 142 138   K 4.0 3.9 4.1 3.8    105 106 106   CO2 24 24 22 24   GLUCOSE 92 99 94 112*   BUN 7 7 8 6*   CREATININE 0.7* 0.6* 0.7* <0.5*   LABGLOM >60 >60 >60 >60   CALCIUM 9.3 8.8 9.6 8.4   PROT 5.8* 5.6* 6.8 6.2*   LABALBU 3.5 3.7 4.4 3.8   AGRATIO 1.5 1.9 1.8 1.6   BILITOT 3.6* 3.2* 5.5* 5.5*   ALKPHOS 68 60 79 62   ALT 33 34 31 20   AST 40* 47* 47* 45*       Lab Results   Component Value Date    CALCIUM 9.3 02/24/2023    PHOS 4.5 10/20/2021       LDH:No results for input(s): LDH in the last 720 hours. Radiology Review:  XR CHEST PORTABLE  Narrative: EXAMINATION:  ONE XRAY VIEW OF THE CHEST    2/22/2023 8:14 am    COMPARISON:  02/17/2023 radiograph    HISTORY:  ORDERING SYSTEM PROVIDED HISTORY: Chest Discomfort  TECHNOLOGIST PROVIDED HISTORY:  Reason for exam:->Chest Discomfort  Reason for Exam: chest discomfort    FINDINGS:  The cardiac silhouette is prominent, likely magnified by portable technique. Mediastinum and pulmonary vascular markings are normal.  No acute airspace  abnormality. No significant skeletal finding. Impression: The lungs appear clear with no acute pulmonary finding. Problem List  Patient Active Problem List   Diagnosis    Sickle cell pain crisis ([facility] [address]    Asthma    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 ([facility] [address]    Sepsis ([facility] [address]    Opiate overdose (HCC)    Opiate antagonist poisoning, accidental or unintentional, initial encounter    Leukocytosis    Hypoxia    Chronic anemia    Other chest pain    Pneumonia due to infectious organism    Fever    Chronic prescription opiate use    Right leg pain    Dental infection    Sickle cell crisis ([address]    History of DVT in adulthood    Overweight (BMI 25.0-29. 9)    Chest pain    Acute electrocardiogram changes    Acute chest syndrome due to sickle-cell disease (HCC)    Priapism    Intractable pain    Gastroesophageal reflux disease without esophagitis    Non-compliance    Sickle cell anemia (HCC) Pulmonary embolism, other, unspecified chronicity, unspecified whether acute cor pulmonale present ([facility] [address]    Acute hypoxemic respiratory failure (HCC)    Right-sided chest pain    Pulmonary embolus (HCC)       ASSESSMENT AND PLAN:    Sickle Cell Crisis  Priapism   - recurrent ischemic priapism 2/2 sickle cell   - on eliquis   - follows with [facility] outpatient; history of noncompliance with hydrea and sildenafil   - phenylephrine administered 02/22 for priapism; could consider addition of sudafed PRN as this has worked on previous admissions.   - Dr. Michele Schroeder had discussion with patient and his mother at last admission about compliance with hydrea. He was agreeable to go back on hydrea 500mg once daily. - agree with IVF  - continue hydrea at prescribed outpatient  - pain control outpatient is oxycodone 10mg-20mg every 4 hrs PRN so will continue this inpatient with dilaudid PRN  - Priapism went away yesterday but has returned on 2/23 in the AM. Urology consulted. - Priapism is improved. - HGB is pending this AM.    - Transfusion indicated if HGB <6 g/dL. - A quick review of [employer] suggested that exchange transfusion does not speed resolution of this issue. It may has a preventative role, but he would need to be compliant. Pain  - pain contract resigned 12/06/2022  - oxycodone is prescribed weekly because he will take them too quickly if it is not administered in this manner. 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   Oxycodone prescription was sent to his retail pharmacy ([facility]). They will fill on 2/25/2023.     Marcy Llanes MD  Please contact through [address]

## 2023-02-24 NOTE — PLAN OF CARE
Problem: Pain  Goal: Verbalizes/displays adequate comfort level or baseline comfort level  2/24/2023 1140 by Herbie Lyn RN  Flowsheets (Taken 2/24/2023 1140)  Verbalizes/displays adequate comfort level or baseline comfort level:   Encourage patient to monitor pain and request assistance   Assess pain using appropriate pain scale   Administer analgesics based on type and severity of pain and evaluate response  2/23/2023 2337 by Chana Sanchez RN  Flowsheets (Taken 2/23/2023 2337)  Verbalizes/displays adequate comfort level or baseline comfort level:   Encourage patient to monitor pain and request assistance   Assess pain using appropriate pain scale   Administer analgesics based on type and severity of pain and evaluate response   Implement non-pharmacological measures as appropriate and evaluate response     Problem: Safety - Adult  Goal: Free from fall injury  Flowsheets (Taken 2/24/2023 1140)  Free From Fall Injury:   Instruct family/caregiver on patient safety   Based on caregiver fall risk screen, instruct family/caregiver to ask for assistance with transferring infant if caregiver noted to have fall risk factors     Problem: ABCDS Injury Assessment  Goal: Absence of physical injury  Flowsheets (Taken 2/24/2023 1140)  Absence of Physical Injury: Implement safety measures based on patient assessment

## 2023-02-24 NOTE — PROGRESS NOTES
Urology Progress Note      Subjective: [facility] feels better. Priapism resolved after intracavernosal phenylephrine yesterday     Vitals:  /62   Pulse 78   Temp 98 °F (36.7 °C) (Oral)   Resp 18   Ht 5' 9\" (1.753 m)   Wt 191 lb 3.2 oz (86.7 kg)   SpO2 94%   BMI 28.24 kg/m²   Temp  Av.3 °F (36.8 °C)  Min: 98 °F (36.7 °C)  Max: 98.8 °F (37.1 °C)    Intake/Output Summary (Last 24 hours) at 2023 0920  Last data filed at 2023 0040  Gross per 24 hour   Intake --   Output 2750 ml   Net -2750 ml       Exam:   Physical:  Well developed, well nourished in no acute distress  Mood indicates no abnormalities. Pt doesnt appear depressed  Orientated to time and place      Male :  Penis is flaccid, mildly tender         Labs:  WBC:    Lab Results   Component Value Date/Time    WBC 9.7 2023 05:30 AM     Hemoglobin/Hematocrit:    Lab Results   Component Value Date/Time    HGB 7.1 2023 05:30 AM    HCT 20.5 2023 05:30 AM     BMP:    Lab Results   Component Value Date/Time     2023 05:33 AM    K 4.0 2023 05:33 AM     2023 05:33 AM    CO2 24 2023 05:33 AM    BUN 7 2023 05:33 AM    LABALBU 3.5 2023 05:33 AM    CREATININE 0.7 2023 05:33 AM    CALCIUM 9.3 2023 05:33 AM    GFRAA >60 2022 02:12 AM    LABGLOM >60 2023 05:33 AM     PT/INR:    Lab Results   Component Value Date/Time    PROTIME 15.5 2023 03:05 AM    INR 1.25 2023 03:05 AM     PTT:    Lab Results   Component Value Date/Time    APTT 31.2 2023 03:05 AM   [APTT    Impression/Plan:      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   - I encouraged Javier to follow up with Dr Monique Luke in office as has been recommended in the past.  Daily sildenafil would be helpful to try and prevent recurrence   - urology will sign off, call with questions.          Paula Sales PA-C  [facility]

## 2023-02-24 NOTE — PROGRESS NOTES
Awake on bed, alert and oriented. IVF LR at 150ml/hr on right AC infusing well. (+) Pain on penis 8/10. PRN medication given as ordered. Placed call light within reach, Instructed to call for assistance. Kept side rails up. Will continue to monitor.

## 2023-02-24 NOTE — CARE COORDINATION
Chart reviewed. Care managed per hem/onc and IM. D/c pending pain control per onc note today possible in next 24hrs.  Oralia Barrios RN

## 2023-02-24 NOTE — PROGRESS NOTES
Took over care for [facility]. Agree with previous assessment. Pt resting comfortably in bed. Pt requesting pain medication, PRN oral pain medication given per MAR. Denies any further needs currently.

## 2023-02-25 VITALS
RESPIRATION RATE: 16 BRPM | HEIGHT: 69 IN | WEIGHT: 191.2 LBS | HEART RATE: 67 BPM | OXYGEN SATURATION: 97 % | SYSTOLIC BLOOD PRESSURE: 125 MMHG | BODY MASS INDEX: 28.32 KG/M2 | TEMPERATURE: 98.2 F | DIASTOLIC BLOOD PRESSURE: 74 MMHG

## 2023-02-25 LAB
A/G RATIO: 1.8 (ref 1.1–2.2)
ALBUMIN SERPL-MCNC: 4.2 G/DL (ref 3.4–5)
ALP BLD-CCNC: 70 U/L (ref 40–129)
ALT SERPL-CCNC: 31 U/L (ref 10–40)
ANION GAP SERPL CALCULATED.3IONS-SCNC: 9 MMOL/L (ref 3–16)
ANISOCYTOSIS: ABNORMAL
AST SERPL-CCNC: 33 U/L (ref 15–37)
BANDED NEUTROPHILS RELATIVE PERCENT: 3 % (ref 0–7)
BASOPHILS ABSOLUTE: 0 K/UL (ref 0–0.2)
BASOPHILS RELATIVE PERCENT: 0 %
BILIRUB SERPL-MCNC: 3.7 MG/DL (ref 0–1)
BUN BLDV-MCNC: 7 MG/DL (ref 7–20)
CALCIUM SERPL-MCNC: 9.5 MG/DL (ref 8.3–10.6)
CHLORIDE BLD-SCNC: 104 MMOL/L (ref 99–110)
CO2: 27 MMOL/L (ref 21–32)
CREAT SERPL-MCNC: 0.7 MG/DL (ref 0.9–1.3)
EOSINOPHILS ABSOLUTE: 0.2 K/UL (ref 0–0.6)
EOSINOPHILS RELATIVE PERCENT: 2 %
GFR SERPL CREATININE-BSD FRML MDRD: >60 ML/MIN/{1.73_M2}
GLUCOSE BLD-MCNC: 91 MG/DL (ref 70–99)
HCT VFR BLD CALC: 24.1 % (ref 40.5–52.5)
HEMATOLOGY PATH CONSULT: NO
HEMOGLOBIN: 8 G/DL (ref 13.5–17.5)
LYMPHOCYTES ABSOLUTE: 3.7 K/UL (ref 1–5.1)
LYMPHOCYTES RELATIVE PERCENT: 37 %
MACROCYTES: ABNORMAL
MCH RBC QN AUTO: 34.3 PG (ref 26–34)
MCHC RBC AUTO-ENTMCNC: 33.3 G/DL (ref 31–36)
MCV RBC AUTO: 103 FL (ref 80–100)
MICROCYTES: ABNORMAL
MONOCYTES ABSOLUTE: 1.1 K/UL (ref 0–1.3)
MONOCYTES RELATIVE PERCENT: 11 %
NEUTROPHILS ABSOLUTE: 5.1 K/UL (ref 1.7–7.7)
NEUTROPHILS RELATIVE PERCENT: 47 %
OVALOCYTES: ABNORMAL
PDW BLD-RTO: 21.9 % (ref 12.4–15.4)
PLATELET # BLD: 445 K/UL (ref 135–450)
PLATELET SLIDE REVIEW: ADEQUATE
PMV BLD AUTO: 8.7 FL (ref 5–10.5)
POLYCHROMASIA: ABNORMAL
POTASSIUM REFLEX MAGNESIUM: 4.3 MMOL/L (ref 3.5–5.1)
RBC # BLD: 2.34 M/UL (ref 4.2–5.9)
SLIDE REVIEW: ABNORMAL
SODIUM BLD-SCNC: 140 MMOL/L (ref 136–145)
TOTAL PROTEIN: 6.5 G/DL (ref 6.4–8.2)
WBC # BLD: 10.1 K/UL (ref 4–11)

## 2023-02-25 PROCEDURE — 80053 COMPREHEN METABOLIC PANEL: CPT

## 2023-02-25 PROCEDURE — 6370000000 HC RX 637 (ALT 250 FOR IP): Performed by: INTERNAL MEDICINE

## 2023-02-25 PROCEDURE — 36415 COLL VENOUS BLD VENIPUNCTURE: CPT

## 2023-02-25 PROCEDURE — 85025 COMPLETE CBC W/AUTO DIFF WBC: CPT

## 2023-02-25 PROCEDURE — 2580000003 HC RX 258: Performed by: INTERNAL MEDICINE

## 2023-02-25 PROCEDURE — 6370000000 HC RX 637 (ALT 250 FOR IP)

## 2023-02-25 PROCEDURE — 6360000002 HC RX W HCPCS: Performed by: INTERNAL MEDICINE

## 2023-02-25 RX ORDER — PSEUDOEPHEDRINE HCL 30 MG
60 TABLET ORAL ONCE
Status: COMPLETED | OUTPATIENT
Start: 2023-02-25 | End: 2023-02-25

## 2023-02-25 RX ADMIN — HYDROMORPHONE HYDROCHLORIDE 1 MG: 1 INJECTION, SOLUTION INTRAMUSCULAR; INTRAVENOUS; SUBCUTANEOUS at 01:36

## 2023-02-25 RX ADMIN — OXYCODONE 10 MG: 5 TABLET ORAL at 08:59

## 2023-02-25 RX ADMIN — PSEUDOEPHEDRINE HCL 60 MG: 30 TABLET, FILM COATED ORAL at 11:41

## 2023-02-25 RX ADMIN — SODIUM CHLORIDE, POTASSIUM CHLORIDE, SODIUM LACTATE AND CALCIUM CHLORIDE: 600; 310; 30; 20 INJECTION, SOLUTION INTRAVENOUS at 09:07

## 2023-02-25 RX ADMIN — APIXABAN 5 MG: 5 TABLET, FILM COATED ORAL at 08:59

## 2023-02-25 RX ADMIN — HYDROXYUREA 1000 MG: 500 CAPSULE ORAL at 09:00

## 2023-02-25 RX ADMIN — FOLIC ACID 2 MG: 1 TABLET ORAL at 08:59

## 2023-02-25 RX ADMIN — OXYCODONE 10 MG: 5 TABLET ORAL at 00:27

## 2023-02-25 RX ADMIN — Medication 10 ML: at 09:00

## 2023-02-25 RX ADMIN — SODIUM CHLORIDE, POTASSIUM CHLORIDE, SODIUM LACTATE AND CALCIUM CHLORIDE: 600; 310; 30; 20 INJECTION, SOLUTION INTRAVENOUS at 03:43

## 2023-02-25 RX ADMIN — HYDROMORPHONE HYDROCHLORIDE 1 MG: 1 INJECTION, SOLUTION INTRAMUSCULAR; INTRAVENOUS; SUBCUTANEOUS at 05:31

## 2023-02-25 RX ADMIN — HYDROMORPHONE HYDROCHLORIDE 1 MG: 1 INJECTION, SOLUTION INTRAMUSCULAR; INTRAVENOUS; SUBCUTANEOUS at 10:05

## 2023-02-25 RX ADMIN — OXYCODONE 10 MG: 5 TABLET ORAL at 04:30

## 2023-02-25 ASSESSMENT — PAIN SCALES - GENERAL
PAINLEVEL_OUTOF10: 7
PAINLEVEL_OUTOF10: 8
PAINLEVEL_OUTOF10: 8
PAINLEVEL_OUTOF10: 7

## 2023-02-25 ASSESSMENT — PAIN DESCRIPTION - DESCRIPTORS
DESCRIPTORS: ACHING
DESCRIPTORS: POUNDING
DESCRIPTORS: ACHING

## 2023-02-25 ASSESSMENT — PAIN DESCRIPTION - LOCATION
LOCATION: CHEST
LOCATION: CHEST;PENIS
LOCATION: CHEST
LOCATION: PENIS
LOCATION: CHEST
LOCATION: CHEST

## 2023-02-25 ASSESSMENT — PAIN - FUNCTIONAL ASSESSMENT: PAIN_FUNCTIONAL_ASSESSMENT: PREVENTS OR INTERFERES SOME ACTIVE ACTIVITIES AND ADLS

## 2023-02-25 ASSESSMENT — PAIN DESCRIPTION - ORIENTATION
ORIENTATION: MID

## 2023-02-25 NOTE — PROGRESS NOTES
ONCOLOGY HEMATOLOGY CARE PROGRESS NOTE      SUBJECTIVE:    Feel significantly better. States that the problem has completely resolved. He needs to join work and is requesting for discharge. Remains afebrile. Vital signs stable. OBJECTIVE        Physical    VITALS:  Patient Vitals for the past 24 hrs:   BP Temp Temp src Pulse Resp SpO2   02/25/23 1035 -- -- -- -- 16 --   02/25/23 1005 -- -- -- -- 18 --   02/25/23 0929 -- -- -- -- 16 --   02/25/23 0845 125/74 98.2 °F (36.8 °C) Oral 67 16 97 %   02/25/23 0430 -- -- -- -- 16 --   02/25/23 0136 -- -- -- -- 16 --   02/24/23 2145 124/72 98.7 °F (37.1 °C) Oral 83 16 93 %   02/24/23 2141 -- -- -- -- 16 --   02/24/23 1600 115/63 97.7 °F (36.5 °C) Oral 73 18 95 %   02/24/23 1315 109/70 98.4 °F (36.9 °C) Oral 66 18 95 %       24HR INTAKE/OUTPUT:    Intake/Output Summary (Last 24 hours) at 2/25/2023 1259  Last data filed at 2/25/2023 0533  Gross per 24 hour   Intake 9785.66 ml   Output 2025 ml   Net 7760.66 ml       CONSTITUTIONAL: awake, alert, cooperative, no apparent distress   EYES: pupils equal, round and reactive to light, sclera clear and conjunctiva normal  ENT: Normocephalic, without obvious abnormality, atraumatic  NECK: supple, symmetrical, no jugular venous distension and no carotid bruits   HEMATOLOGIC/LYMPHATIC: no cervical, supraclavicular or axillary lymphadenopathy   LUNGS: no increased work of breathing and clear to auscultation   CARDIOVASCULAR: regular rate and rhythm, normal S1 and S2, no murmur noted  ABDOMEN: normal bowel sounds x 4, soft, non-distended, non-tender, no masses palpated, no hepatosplenomgaly   MUSCULOSKELETAL: full range of motion noted, tone is normal  NEUROLOGIC: awake, alert, oriented to name, place and time. Motor skills grossly intact. SKIN: Normal skin color, texture, turgor and no jaundice.  appears intact   EXTREMITIES: no LE edema     DATA:  CBC:    Recent Labs     02/25/23  0529 02/24/23  0903 02/23/23  0530 02/22/23  0813   WBC 10.1 11.0 9.7 13.8*   NEUTROABS 5.1 5.8 5.7 9.1*   LYMPHOPCT 37.0 34.0 36.0 24.0   RBC 2.34* 2.20* 1.93* 2.36*   HGB 8.0* 7.8* 7.1* 8.4*   HCT 24.1* 22.8* 20.5* 24.7*   .0* 103.6* 106.3* 104.9*   MCH 34.3* 35.4* 36.8* 35.4*   MCHC 33.3 34.2 34.7 33.8   RDW 21.9* 23.3* 26.4* 28.3*    435 391 474*       PT/INR:    Recent Labs     02/25/23  0528 02/24/23  0533 02/23/23  0530 02/22/23  0813 02/18/23  0228 02/17/23  0305   PROT 6.5 5.8* 5.6* 6.8 6.2* 6.7   INR  --   --   --   --   --  1.25*     PTT:    Recent Labs     02/17/23  0305   APTT 31.2       CMP:    Recent Labs     02/25/23  0528 02/24/23  0533 02/23/23  0530 02/22/23  0813    138 137 142   K 4.3 4.0 3.9 4.1    105 105 106   CO2 27 24 24 22   GLUCOSE 91 92 99 94   BUN 7 7 7 8   CREATININE 0.7* 0.7* 0.6* 0.7*   LABGLOM >60 >60 >60 >60   CALCIUM 9.5 9.3 8.8 9.6   PROT 6.5 5.8* 5.6* 6.8   LABALBU 4.2 3.5 3.7 4.4   AGRATIO 1.8 1.5 1.9 1.8   BILITOT 3.7* 3.6* 3.2* 5.5*   ALKPHOS 70 68 60 79   ALT 31 33 34 31   AST 33 40* 47* 47*       Lab Results   Component Value Date    CALCIUM 9.5 02/25/2023    PHOS 4.5 10/20/2021       LDH:No results for input(s): LDH in the last 720 hours. Radiology Review:  XR CHEST PORTABLE  Narrative: EXAMINATION:  ONE XRAY VIEW OF THE CHEST    2/22/2023 8:14 am    COMPARISON:  02/17/2023 radiograph    HISTORY:  ORDERING SYSTEM PROVIDED HISTORY: Chest Discomfort  TECHNOLOGIST PROVIDED HISTORY:  Reason for exam:->Chest Discomfort  Reason for Exam: chest discomfort    FINDINGS:  The cardiac silhouette is prominent, likely magnified by portable technique. Mediastinum and pulmonary vascular markings are normal.  No acute airspace  abnormality. No significant skeletal finding. Impression: The lungs appear clear with no acute pulmonary finding.       Problem List  Patient Active Problem List   Diagnosis    Sickle cell pain crisis ([facility] [address]    Asthma    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 ([facility] [address]    Sepsis ([facility] [address]    Opiate overdose (HCC)    Opiate antagonist poisoning, accidental or unintentional, initial encounter    Leukocytosis    Hypoxia    Chronic anemia    Other chest pain    Pneumonia due to infectious organism    Fever    Chronic prescription opiate use    Right leg pain    Dental infection    Sickle cell crisis ([facility] [address]    History of DVT in adulthood    Overweight (BMI 25.0-29. 9)    Chest pain    Acute electrocardiogram changes    Acute chest syndrome due to sickle-cell disease (HCC)    Priapism    Intractable pain    Gastroesophageal reflux disease without esophagitis    Non-compliance    Sickle cell anemia (HCC)    Pulmonary embolism, other, unspecified chronicity, unspecified whether acute cor pulmonale present (HCC)    Acute hypoxemic respiratory failure (HCC)    Right-sided chest pain    Pulmonary embolus ([facility])       ASSESSMENT AND PLAN:    Sickle Cell Crisis  Priapism  - recurrent ischemic priapism 2/2 sickle cell   - on eliquis   - follows with [facility] outpatient; history of noncompliance with hydrea and sildenafil   - phenylephrine administered 02/22 for priapism; could consider addition of sudafed PRN as this has worked on previous admissions.   -Transfusion indicated if HGB <6 g/dL. -Patient is symptomatically better and eager to get back to work. Counseled him again regarding the importance of compliance with the medications.  -Reviewed the hematological parameters-stable for discharge. 2.Macrocytic anemia  -To continue with U71 and folic acid to promote hematopoiesis.  -No transfusion requirements today. Patient can be discharged from the hematology/oncology standpoint to follow-up as an outpatient with . ONCOLOGIC DISPOSITION:    Oxycodone prescription was sent to his retail pharmacy ([address] [place] [address]). They will fill on 2/25/2023.     Katie Cervantes MD  Please contact through [address] [facility]

## 2023-02-25 NOTE — PLAN OF CARE
Problem: Pain  Goal: Verbalizes/displays adequate comfort level or baseline comfort level  2/25/2023 1033 by Zuleima Chen RN  Flowsheets (Taken 2/25/2023 1033)  Verbalizes/displays adequate comfort level or baseline comfort level:   Encourage patient to monitor pain and request assistance   Assess pain using appropriate pain scale   Administer analgesics based on type and severity of pain and evaluate response  2/24/2023 2306 by Basilia Zhou RN  Outcome: Progressing     Problem: Safety - Adult  Goal: Free from fall injury  2/25/2023 1033 by Zuleima Chen RN  Flowsheets (Taken 2/25/2023 1033)  Free From Fall Injury:   Hammad Pal family/caregiver on patient safety   Based on caregiver fall risk screen, instruct family/caregiver to ask for assistance with transferring infant if caregiver noted to have fall risk factors  2/24/2023 2306 by Basilia Zhou RN  Outcome: Progressing     Problem: ABCDS Injury Assessment  Goal: Absence of physical injury  2/25/2023 1033 by Zuleima Chen RN  Flowsheets (Taken 2/25/2023 1033)  Absence of Physical Injury: Implement safety measures based on patient assessment  2/24/2023 2306 by Basilia Zhou RN  Outcome: Progressing

## 2023-02-25 NOTE — PROGRESS NOTES
Patient given discharge instructions. All questions and concerns were addressed. Patient dressed and left the floor up ad galindo with all patient belongings. Patient declined to be wheeled off the floor and left the floor up ad galindo in NAD.

## 2023-02-25 NOTE — DISCHARGE INSTR - COC
Continuity of Care Form    Patient Name: Ajay Mendoza   :  2000  MRN:  [id number]    Admit date:  2023  Discharge date:  2023    Code Status Order: Full Code   Advance Directives:     Admitting Physician:  Bill Pennington MD  PCP: Tracy Crowe MD    Discharging Nurse: Just[facility] [address] [facility]/Room#: 0337/0337-01  Discharging Unit Phone Number: [phone]    Emergency Contact:   Extended Emergency Contact Information  Primary Emergency Contact: [address] [place] [address] Phone: [phone]  Relation: Parent   needed?  No  Secondary Emergency Contact: Sukhwinder Javed  Home Phone: [phone]  Relation: Parent    Past Surgical History:  Past Surgical History:   Procedure Laterality Date    ABSCESS DRAINAGE Left 2012    leg    SPLENECTOMY         Immunization History:   Immunization History   Administered Date(s) Administered    DTaP, 5 Pertussis Antigens (Daptacel) 1999, 2000, 2001, 10/05/2004, 10/28/2011    Hepatitis A 04/15/2011, 10/28/2011    Influenza Vaccine, unspecified formulation 2012, 2017, 2019    MMR 2000, 10/05/2004    Meningococcal B, Recombinant Arturo Clearbrook) 2018, 2018, 2019    Meningococcal MCV4P (Menactra) 04/15/2011, 2016    Pneumococcal Conjugate 13-valent ([id number]) 04/15/2011    Pneumococcal Polysaccharide ([id number]) 2011    Tdap (Boostrix, Adacel) 10/28/2011, 2013    Varicella (Varivax) 10/05/2004, 04/15/2011       Active Problems:  Patient Active Problem List   Diagnosis Code    Sickle cell pain crisis ([facility] D57.00    Asthma J45.909    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 ([facility]) D57.1, N48.32    Sepsis ([facility] A41.9    Opiate overdose ([facility]) T40.601A    Opiate antagonist poisoning, accidental or unintentional, initial encounter T50.7X1A    Leukocytosis D72.829    Hypoxia R09.02    Chronic anemia D64.9    Other chest pain R07.89    Pneumonia due to infectious organism J18.9 Fever R50.9    Chronic prescription opiate use Z79.891    Right leg pain M79.604    Dental infection K04.7    Sickle cell crisis ([address] D57.00    History of DVT in adulthood Z86.718    Overweight (BMI 25.0-29. 9) E66.3    Chest pain R07.9    Acute electrocardiogram changes R94.31    Acute chest syndrome due to sickle-cell disease ([facility]) D57.01    Priapism N48.30    Intractable pain R52    Gastroesophageal reflux disease without esophagitis K21.9    Non-compliance Z91.199    Sickle cell anemia ([facility]) D57.1    Pulmonary embolism, other, unspecified chronicity, unspecified whether acute cor pulmonale present ([facility]) I26.99    Acute hypoxemic respiratory failure ([facility]) J96.01    Right-sided chest pain R07.9    Pulmonary embolus ([facility]) I26.99       Isolation/Infection:   Isolation            Chemo          Patient Infection Status       Infection Onset Added Last Indicated Last Indicated By Review Planned Expiration Resolved Resolved By    None active    Resolved    COVID-19 (Rule Out) 02/17/23 02/17/23 02/17/23 COVID-19 & Influenza Combo (Ordered)   02/17/23 Rule-Out Test Resulted    COVID-19 (Rule Out) 02/10/23 02/10/23 02/10/23 COVID-19 & Influenza Combo (Ordered)   02/10/23 Rule-Out Test Resulted    COVID-19 (Rule Out) 08/27/22 08/27/22 08/27/22 COVID-19, Rapid (Ordered)   08/27/22 Rule-Out Test Resulted    COVID-19 (Rule Out) 01/25/22 01/25/22 01/25/22 COVID-19, Rapid (Ordered)   01/25/22 Rule-Out Test Resulted    COVID-19 (Rule Out) 01/09/22 01/09/22 01/09/22 COVID-19, Rapid (Ordered)   01/09/22 Rule-Out Test Resulted    COVID-19 (Rule Out) 07/28/21 07/28/21 07/28/21 COVID-19, Rapid (Ordered)   07/28/21 Rule-Out Test Resulted    COVID-19 (Rule Out) 01/10/21 01/10/21 01/10/21 COVID-19 (Ordered)   01/11/21 Rule-Out Test Resulted    COVID-19 (Rule Out) 01/05/21 01/05/21 01/05/21 COVID-19 (Ordered)   01/05/21 Rule-Out Test Resulted    COVID-19 (Rule Out) 12/05/20 12/05/20 12/05/20 COVID-19 (Ordered)   12/06/20 Rule-Out Test Resulted    COVID-19 (Rule Out) 12/01/20 12/01/20 12/01/20 COVID-19 (Ordered)   12/01/20 Rule-Out Test Resulted    COVID-19 (Rule Out) 08/06/20 08/06/20 08/11/20 COVID-19 (Ordered)   08/12/20 Rule-Out Test Resulted            Nurse Assessment:  Last Vital Signs: /74   Pulse 67   Temp 98.2 °F (36.8 °C) (Oral)   Resp 16   Ht 5' 9\" (1.753 m)   Wt 191 lb 3.2 oz (86.7 kg)   SpO2 97%   BMI 28.24 kg/m²     Last documented pain score (0-10 scale): Pain Level: 7  Last Weight:   Wt Readings from Last 1 Encounters:   02/22/23 191 lb 3.2 oz (86.7 kg)     Mental Status:  oriented, alert, coherent, logical, thought processes intact, and able to concentrate and follow conversation    IV Access:  - None    Nursing Mobility/ADLs:  Walking   Independent  Transfer  Independent  Bathing  Independent  Dressing  Independent  [facility]  Independent  Med Delivery   whole    Wound Care Documentation and Therapy:        Elimination:  Continence: Bowel: Yes  Bladder: Yes  Urinary Catheter: None   Colostomy/Ileostomy/Ileal Conduit: No       Date of Last BM:     Intake/Output Summary (Last 24 hours) at 2/25/2023 1107  Last data filed at 2/25/2023 0533  Gross per 24 hour   Intake 9785.66 ml   Output 2025 ml   Net 7760.66 ml     I/O last 3 completed shifts: In: 9785.7 [P.O.:1200; I.V.:8585.7]  Out: 1847 [Urine:3925]    Safety Concerns:     None    Impairments/Disabilities:      None    Nutrition Therapy:  Current Nutrition Therapy:   - Oral Diet:  General    Routes of Feeding: Oral  Liquids: Thin Liquids  Daily Fluid Restriction: no  Last Modified Barium Swallow with Video (Video Swallowing Test): not done    Treatments at the Time of Hospital Discharge:   Respiratory Treatments:   Oxygen Therapy:  is not on home oxygen therapy.   Ventilator:    - No ventilator support    Rehab Therapies:   Weight Bearing Status/Restrictions: No weight bearing restrictions  Other Medical Equipment (for information only, NOT a DME order): Other Treatments:     Patient's personal belongings (please select all that are sent with patient):  None    RN SIGNATURE:  Electronically signed by Derick Amato RN on 2/25/23 at 1:14 PM EST    CASE MANAGEMENT/SOCIAL WORK SECTION    Inpatient Status Date: ***    Readmission Risk Assessment Score:  Readmission Risk              Risk of Unplanned Readmission:  37           Discharging to Facility/ Agency   Name:   Address:  Phone:  Fax:    Dialysis Facility (if applicable)   Name:  Address:  Dialysis Schedule:  Phone:  Fax:    / signature: {Esignature:[id number]}    PHYSICIAN SECTION    Prognosis: {Prognosis:0290260105}    Condition at Discharge: [address] Patient Condition:[id number]}    Rehab Potential (if transferring to Rehab): {Prognosis:7220760669}    Recommended Labs or Other Treatments After Discharge: ***    Physician Certification: I certify the above information and transfer of Max Acevedo  is necessary for the continuing treatment of the diagnosis listed and that he requires {Admit to Appropriate Level of Care:58753} for {GREATER/LESS:545984892} 30 days.      Update Admission H&P: {CHP DME Changes in OLVMS:124042524}    PHYSICIAN SIGNATURE:  {Esignature:[id number]}

## 2023-02-25 NOTE — PROGRESS NOTES
Shift assessment completed. Pt A&O, VSS. PRN for pain control for patient. Denies any needs at this time. Bed locked and in lowest position. Call light within reach. Will continue to monitor.

## 2023-02-25 NOTE — PLAN OF CARE
Problem: Pain  Goal: Verbalizes/displays adequate comfort level or baseline comfort level  2/24/2023 2306 by Adams Acosta RN  Outcome: Progressing  2/24/2023 1140 by Saud Marks RN  Flowsheets (Taken 2/24/2023 1140)  Verbalizes/displays adequate comfort level or baseline comfort level:   Encourage patient to monitor pain and request assistance   Assess pain using appropriate pain scale   Administer analgesics based on type and severity of pain and evaluate response     Problem: Safety - Adult  Goal: Free from fall injury  2/24/2023 2306 by Adams Acosta RN  Outcome: Progressing  2/24/2023 1140 by Saud Marks RN  Flowsheets (Taken 2/24/2023 1140)  Free From Fall Injury:   Instruct family/caregiver on patient safety   Based on caregiver fall risk screen, instruct family/caregiver to ask for assistance with transferring infant if caregiver noted to have fall risk factors     Problem: ABCDS Injury Assessment  Goal: Absence of physical injury  2/24/2023 2306 by Adams Acosta RN  Outcome: Progressing  2/24/2023 1140 by Saud Marks RN  Flowsheets (Taken 2/24/2023 1140)  Absence of Physical Injury: Implement safety measures based on patient assessment

## 2023-02-28 ENCOUNTER — HOSPITAL ENCOUNTER (INPATIENT)
Age: 23
LOS: 5 days | Discharge: HOME | DRG: 811 | End: 2023-03-05
Attending: EMERGENCY MEDICINE | Admitting: INTERNAL MEDICINE
Payer: COMMERCIAL

## 2023-02-28 DIAGNOSIS — D57.1 PRIAPISM DUE TO SICKLE CELL DISEASE (HCC): ICD-10-CM

## 2023-02-28 DIAGNOSIS — N48.32 PRIAPISM DUE TO SICKLE CELL DISEASE (HCC): ICD-10-CM

## 2023-02-28 DIAGNOSIS — D57.00 SICKLE CELL PAIN CRISIS (HCC): Primary | ICD-10-CM

## 2023-02-28 LAB
A/G RATIO: 1.5 (ref 1.1–2.2)
ALBUMIN SERPL-MCNC: 4.8 G/DL (ref 3.4–5)
ALP BLD-CCNC: 83 U/L (ref 40–129)
ALT SERPL-CCNC: 30 U/L (ref 10–40)
ANION GAP SERPL CALCULATED.3IONS-SCNC: 11 MMOL/L (ref 3–16)
AST SERPL-CCNC: 59 U/L (ref 15–37)
BILIRUB SERPL-MCNC: 6.2 MG/DL (ref 0–1)
BUN BLDV-MCNC: 9 MG/DL (ref 7–20)
CALCIUM SERPL-MCNC: 10.1 MG/DL (ref 8.3–10.6)
CHLORIDE BLD-SCNC: 103 MMOL/L (ref 99–110)
CO2: 25 MMOL/L (ref 21–32)
CREAT SERPL-MCNC: 0.6 MG/DL (ref 0.9–1.3)
GFR SERPL CREATININE-BSD FRML MDRD: >60 ML/MIN/{1.73_M2}
GLUCOSE BLD-MCNC: 103 MG/DL (ref 70–99)
INFLUENZA A: NOT DETECTED
INFLUENZA B: NOT DETECTED
POTASSIUM REFLEX MAGNESIUM: 4.6 MMOL/L (ref 3.5–5.1)
SARS-COV-2 RNA, RT PCR: NOT DETECTED
SEDIMENTATION RATE, ERYTHROCYTE: 18 MM/HR (ref 0–15)
SODIUM BLD-SCNC: 139 MMOL/L (ref 136–145)
TOTAL PROTEIN: 8 G/DL (ref 6.4–8.2)

## 2023-02-28 PROCEDURE — 96374 THER/PROPH/DIAG INJ IV PUSH: CPT

## 2023-02-28 PROCEDURE — 2580000003 HC RX 258: Performed by: PHYSICIAN ASSISTANT

## 2023-02-28 PROCEDURE — 6360000002 HC RX W HCPCS: Performed by: PHYSICIAN ASSISTANT

## 2023-02-28 PROCEDURE — 6370000000 HC RX 637 (ALT 250 FOR IP): Performed by: PHYSICIAN ASSISTANT

## 2023-02-28 PROCEDURE — 2580000003 HC RX 258: Performed by: EMERGENCY MEDICINE

## 2023-02-28 PROCEDURE — 36415 COLL VENOUS BLD VENIPUNCTURE: CPT

## 2023-02-28 PROCEDURE — 99285 EMERGENCY DEPT VISIT HI MDM: CPT

## 2023-02-28 PROCEDURE — 85045 AUTOMATED RETICULOCYTE COUNT: CPT

## 2023-02-28 PROCEDURE — 2580000003 HC RX 258: Performed by: INTERNAL MEDICINE

## 2023-02-28 PROCEDURE — 6360000002 HC RX W HCPCS: Performed by: EMERGENCY MEDICINE

## 2023-02-28 PROCEDURE — 1200000000 HC SEMI PRIVATE

## 2023-02-28 PROCEDURE — 96375 TX/PRO/DX INJ NEW DRUG ADDON: CPT

## 2023-02-28 PROCEDURE — 85652 RBC SED RATE AUTOMATED: CPT

## 2023-02-28 PROCEDURE — 80053 COMPREHEN METABOLIC PANEL: CPT

## 2023-02-28 PROCEDURE — 96376 TX/PRO/DX INJ SAME DRUG ADON: CPT

## 2023-02-28 PROCEDURE — 85025 COMPLETE CBC W/AUTO DIFF WBC: CPT

## 2023-02-28 PROCEDURE — 87636 SARSCOV2 & INF A&B AMP PRB: CPT

## 2023-02-28 PROCEDURE — 6370000000 HC RX 637 (ALT 250 FOR IP): Performed by: INTERNAL MEDICINE

## 2023-02-28 RX ORDER — PSEUDOEPHEDRINE HCL 30 MG
60 TABLET ORAL DAILY PRN
Status: DISCONTINUED | OUTPATIENT
Start: 2023-02-28 | End: 2023-03-05 | Stop reason: HOSPADM

## 2023-02-28 RX ORDER — SODIUM CHLORIDE 0.9 % (FLUSH) 0.9 %
5-40 SYRINGE (ML) INJECTION EVERY 12 HOURS SCHEDULED
Status: DISCONTINUED | OUTPATIENT
Start: 2023-02-28 | End: 2023-03-05 | Stop reason: HOSPADM

## 2023-02-28 RX ORDER — POTASSIUM CHLORIDE 20 MEQ/1
40 TABLET, EXTENDED RELEASE ORAL PRN
Status: DISCONTINUED | OUTPATIENT
Start: 2023-02-28 | End: 2023-03-05 | Stop reason: HOSPADM

## 2023-02-28 RX ORDER — 0.9 % SODIUM CHLORIDE 0.9 %
1000 INTRAVENOUS SOLUTION INTRAVENOUS ONCE
Status: COMPLETED | OUTPATIENT
Start: 2023-02-28 | End: 2023-02-28

## 2023-02-28 RX ORDER — SILDENAFIL CITRATE 20 MG/1
20 TABLET ORAL DAILY
Status: DISCONTINUED | OUTPATIENT
Start: 2023-03-01 | End: 2023-02-28

## 2023-02-28 RX ORDER — ACETAMINOPHEN 325 MG/1
650 TABLET ORAL EVERY 6 HOURS PRN
Status: DISCONTINUED | OUTPATIENT
Start: 2023-02-28 | End: 2023-03-05 | Stop reason: HOSPADM

## 2023-02-28 RX ORDER — POTASSIUM CHLORIDE 7.45 MG/ML
10 INJECTION INTRAVENOUS PRN
Status: DISCONTINUED | OUTPATIENT
Start: 2023-02-28 | End: 2023-03-05 | Stop reason: HOSPADM

## 2023-02-28 RX ORDER — LEVALBUTEROL 1.25 MG/.5ML
0.31 SOLUTION, CONCENTRATE RESPIRATORY (INHALATION) EVERY 6 HOURS PRN
Status: DISCONTINUED | OUTPATIENT
Start: 2023-02-28 | End: 2023-03-05 | Stop reason: HOSPADM

## 2023-02-28 RX ORDER — SODIUM CHLORIDE 9 MG/ML
INJECTION, SOLUTION INTRAVENOUS CONTINUOUS
Status: DISCONTINUED | OUTPATIENT
Start: 2023-02-28 | End: 2023-03-05 | Stop reason: HOSPADM

## 2023-02-28 RX ORDER — ACETAMINOPHEN 500 MG
1000 TABLET ORAL ONCE
Status: COMPLETED | OUTPATIENT
Start: 2023-02-28 | End: 2023-02-28

## 2023-02-28 RX ORDER — ONDANSETRON 2 MG/ML
4 INJECTION INTRAMUSCULAR; INTRAVENOUS EVERY 6 HOURS PRN
Status: DISCONTINUED | OUTPATIENT
Start: 2023-02-28 | End: 2023-03-05 | Stop reason: HOSPADM

## 2023-02-28 RX ORDER — KETOROLAC TROMETHAMINE 30 MG/ML
50 INJECTION, SOLUTION INTRAMUSCULAR; INTRAVENOUS ONCE
Status: COMPLETED | OUTPATIENT
Start: 2023-02-28 | End: 2023-02-28

## 2023-02-28 RX ORDER — ACETAMINOPHEN 650 MG/1
650 SUPPOSITORY RECTAL EVERY 6 HOURS PRN
Status: DISCONTINUED | OUTPATIENT
Start: 2023-02-28 | End: 2023-03-05 | Stop reason: HOSPADM

## 2023-02-28 RX ORDER — SODIUM CHLORIDE 0.9 % (FLUSH) 0.9 %
5-40 SYRINGE (ML) INJECTION PRN
Status: DISCONTINUED | OUTPATIENT
Start: 2023-02-28 | End: 2023-03-05 | Stop reason: HOSPADM

## 2023-02-28 RX ORDER — SILDENAFIL CITRATE 20 MG/1
20 TABLET ORAL DAILY
Status: DISCONTINUED | OUTPATIENT
Start: 2023-02-28 | End: 2023-03-05 | Stop reason: HOSPADM

## 2023-02-28 RX ORDER — HYDROXYUREA 500 MG/1
1000 CAPSULE ORAL 2 TIMES DAILY
Status: DISCONTINUED | OUTPATIENT
Start: 2023-02-28 | End: 2023-03-05 | Stop reason: HOSPADM

## 2023-02-28 RX ORDER — SODIUM CHLORIDE 9 MG/ML
INJECTION, SOLUTION INTRAVENOUS PRN
Status: DISCONTINUED | OUTPATIENT
Start: 2023-02-28 | End: 2023-03-05 | Stop reason: HOSPADM

## 2023-02-28 RX ORDER — MAGNESIUM SULFATE IN WATER 40 MG/ML
2000 INJECTION, SOLUTION INTRAVENOUS PRN
Status: DISCONTINUED | OUTPATIENT
Start: 2023-02-28 | End: 2023-03-05 | Stop reason: HOSPADM

## 2023-02-28 RX ORDER — FOLIC ACID 1 MG/1
2 TABLET ORAL DAILY
Status: DISCONTINUED | OUTPATIENT
Start: 2023-03-01 | End: 2023-03-05 | Stop reason: HOSPADM

## 2023-02-28 RX ORDER — POLYETHYLENE GLYCOL 3350 17 G/17G
17 POWDER, FOR SOLUTION ORAL DAILY PRN
Status: DISCONTINUED | OUTPATIENT
Start: 2023-02-28 | End: 2023-03-05 | Stop reason: HOSPADM

## 2023-02-28 RX ORDER — ONDANSETRON 4 MG/1
4 TABLET, ORALLY DISINTEGRATING ORAL EVERY 8 HOURS PRN
Status: DISCONTINUED | OUTPATIENT
Start: 2023-02-28 | End: 2023-03-05 | Stop reason: HOSPADM

## 2023-02-28 RX ADMIN — ACETAMINOPHEN 1000 MG: 500 TABLET ORAL at 16:26

## 2023-02-28 RX ADMIN — SODIUM CHLORIDE: 9 INJECTION, SOLUTION INTRAVENOUS at 21:40

## 2023-02-28 RX ADMIN — HYDROMORPHONE HYDROCHLORIDE 0.5 MG: 1 INJECTION, SOLUTION INTRAMUSCULAR; INTRAVENOUS; SUBCUTANEOUS at 13:39

## 2023-02-28 RX ADMIN — KETOROLAC TROMETHAMINE 50 MG: 30 INJECTION, SOLUTION INTRAMUSCULAR at 16:27

## 2023-02-28 RX ADMIN — OXYCODONE HYDROCHLORIDE 20 MG: 15 TABLET ORAL at 21:41

## 2023-02-28 RX ADMIN — HYDROMORPHONE HYDROCHLORIDE 1 MG: 1 INJECTION, SOLUTION INTRAMUSCULAR; INTRAVENOUS; SUBCUTANEOUS at 14:44

## 2023-02-28 RX ADMIN — APIXABAN 5 MG: 5 TABLET, FILM COATED ORAL at 21:41

## 2023-02-28 RX ADMIN — HYDROXYUREA 1000 MG: 500 CAPSULE ORAL at 21:43

## 2023-02-28 RX ADMIN — SODIUM CHLORIDE 1000 ML: 9 INJECTION, SOLUTION INTRAVENOUS at 14:48

## 2023-02-28 RX ADMIN — SODIUM CHLORIDE, PRESERVATIVE FREE 10 ML: 5 INJECTION INTRAVENOUS at 21:45

## 2023-02-28 RX ADMIN — SILDENAFIL 20 MG: 20 TABLET, FILM COATED ORAL at 21:43

## 2023-02-28 RX ADMIN — PHENYLEPHRINE HYDROCHLORIDE 0.5 MG: 10 INJECTION INTRAVENOUS at 14:48

## 2023-02-28 RX ADMIN — HYDROMORPHONE HYDROCHLORIDE 1 MG: 1 INJECTION, SOLUTION INTRAMUSCULAR; INTRAVENOUS; SUBCUTANEOUS at 19:58

## 2023-02-28 ASSESSMENT — PAIN SCALES - WONG BAKER
WONGBAKER_NUMERICALRESPONSE: 2
WONGBAKER_NUMERICALRESPONSE: 2

## 2023-02-28 ASSESSMENT — PAIN SCALES - GENERAL
PAINLEVEL_OUTOF10: 9
PAINLEVEL_OUTOF10: 9
PAINLEVEL_OUTOF10: 8
PAINLEVEL_OUTOF10: 9
PAINLEVEL_OUTOF10: 9
PAINLEVEL_OUTOF10: 10
PAINLEVEL_OUTOF10: 10
PAINLEVEL_OUTOF10: 9
PAINLEVEL_OUTOF10: 9

## 2023-02-28 ASSESSMENT — ENCOUNTER SYMPTOMS
RHINORRHEA: 0
SINUS PRESSURE: 0
EYE DISCHARGE: 0
NAUSEA: 0
DIARRHEA: 0
SHORTNESS OF BREATH: 0
COUGH: 0
EYE REDNESS: 0
ABDOMINAL PAIN: 0
VOMITING: 0
CONSTIPATION: 0
CHEST TIGHTNESS: 0
SINUS PAIN: 0
SORE THROAT: 0

## 2023-02-28 ASSESSMENT — PAIN DESCRIPTION - LOCATION
LOCATION: PENIS
LOCATION: HIP

## 2023-02-28 ASSESSMENT — PAIN DESCRIPTION - ONSET: ONSET: ON-GOING

## 2023-02-28 ASSESSMENT — PAIN DESCRIPTION - ORIENTATION: ORIENTATION: RIGHT

## 2023-02-28 ASSESSMENT — PAIN DESCRIPTION - DESCRIPTORS: DESCRIPTORS: SHARP

## 2023-02-28 ASSESSMENT — PAIN DESCRIPTION - FREQUENCY: FREQUENCY: OTHER (COMMENT)

## 2023-02-28 ASSESSMENT — PAIN - FUNCTIONAL ASSESSMENT: PAIN_FUNCTIONAL_ASSESSMENT: ACTIVITIES ARE NOT PREVENTED

## 2023-02-28 ASSESSMENT — PAIN DESCRIPTION - PAIN TYPE: TYPE: ACUTE PAIN

## 2023-02-28 NOTE — ED NOTES
1652- perfect serve sent to Dr. Jaylon Hermosillo for consult     Dr. Jaylon Hermosillo called back and spoke to [phone] [address]  02/28/23 [address] [place] [address]  02/28/23 4879

## 2023-02-28 NOTE — ED PROVIDER NOTES
[address]        Pt Name: Anel Jain  MRN: [id number]  [place] 2000  Date of evaluation: 2/28/2023  Provider: Christa Haque PA-C  PCP: Martinez Hoff MD  Note Started: 1:35 PM EST 2/28/23       I have seen and evaluated this patient with my supervising physician Bev Mota MD.      CHIEF COMPLAINT       Chief Complaint   Patient presents with    Other     C/O priapism since 1550, complicated sickle cell and has hx of     Sickle Cell Pain Crisis       HISTORY OF PRESENT ILLNESS: 1 or more Elements     History from : Patient    Limitations to history : None    Anel Jain is a 20 y.o. male with a past medical history of sickle cell disease, DVT, asthma, who presents to the ED via EMS with a complaint of a 3-hour history of an erect painful penis, 10/10. Patient indicates this is consistent with his baseline episodes of priapism. Patient has a history of sickle cell disease. He indicates that he did not have any pain medication prior to coming into the ER. Does not take anything for symptoms. Not tried anything to help resolve his symptoms. He indicates that he has painful daily erections daily, however often times they do not resolve on their own. He is not taking, viagra or ciallis, as he advised cost being a barrier to him obtaining this medication. Nursing Notes were all reviewed and agreed with or any disagreements were addressed in the HPI. REVIEW OF SYSTEMS :      Review of Systems   Constitutional:  Negative for chills and fever. HENT: Negative. Negative for congestion, rhinorrhea, sinus pressure, sinus pain and sore throat. Eyes:  Negative for discharge, redness and visual disturbance. Respiratory:  Negative for cough, chest tightness and shortness of breath. Cardiovascular:  Negative for chest pain and palpitations.    Gastrointestinal:  Negative for abdominal pain, constipation, diarrhea, nausea and vomiting. Genitourinary:  Positive for penile pain. Negative for difficulty urinating, dysuria and frequency. Musculoskeletal: Negative. Skin: Negative. Neurological: Negative. Negative for dizziness, weakness, numbness and headaches. Psychiatric/Behavioral: Negative. All other systems reviewed and are negative. Positives and Pertinent negatives as per HPI. SURGICAL HISTORY     Past Surgical History:   Procedure Laterality Date    ABSCESS DRAINAGE Left 2012    leg    SPLENECTOMY         CURRENTMEDICATIONS       Current Discharge Medication List        CONTINUE these medications which have NOT CHANGED    Details   apixaban starter pack (ELIQUIS) 5 MG TBPK tablet Take 2 tablets by mouth 2 times daily for 7 days, THEN 1 tablet 2 times daily for 23 days. Patient will take 2 tablets bid for 6.5 days as he had his first dose of Elqiuis in the hospital.  Qty: 72 tablet, Refills: 0      hydroxyurea (HYDREA) 500 MG chemo capsule Take 2 capsules by mouth 2 times daily      pseudoephedrine (SUDAFED) 60 MG tablet Take 1 tablet by mouth daily as needed (priapism)      folic acid (FOLVITE) 1 MG tablet Take 2 tablets by mouth daily  Qty: 30 tablet, Refills: 3      oxyCODONE HCl (OXY-IR) 10 MG immediate release tablet Take 20 mg by mouth every 4 hours as needed for Pain.  Patient states he takes 20mg q 4 hours as needed      albuterol sulfate HFA (PROVENTIL;VENTOLIN;PROAIR) 108 (90 Base) MCG/ACT inhaler Albuterol HFA Inhaler 90 mcg/actuation  inhaled  2 puffs prn     Active      levalbuterol (XOPENEX) 0.31 MG/3ML nebulization Levalbuterol Nebulized    2 puffs prn     Active             ALLERGIES     Morphine    FAMILYHISTORY       Family History   Problem Relation Age of Onset    Other Father         sarcoidosis        SOCIAL HISTORY       Social History     Tobacco Use    Smoking status: Never     Passive exposure: Never    Smokeless tobacco: Never   Vaping Use    Vaping Use: Never used   Substance Use Topics    Alcohol use: Not Currently    Drug use: Never       SCREENINGS        Blade Coma Scale  Eye Opening: Spontaneous  Best Verbal Response: Oriented  Best Motor Response: Obeys commands  [place] Coma Scale Score: 15                CIWA Assessment  BP: (!) 112/59  Heart Rate: 71           PHYSICAL EXAM  1 or more Elements     Vitals:    03/01/23 0919   BP: (!) 112/59   Pulse: 71   Resp: 16   Temp: 97.2 °F (36.2 °C)   SpO2:           Physical Exam  Vitals and nursing note reviewed. Exam conducted with a chaperone present. Constitutional:       General: He is in acute distress. Appearance: Normal appearance. He is well-developed. He is not diaphoretic. HENT:      Head: Normocephalic and atraumatic. Nose: Nose normal.      Mouth/Throat:      Mouth: Mucous membranes are moist.      Pharynx: No oropharyngeal exudate. Eyes:      General:         Right eye: No discharge. Left eye: No discharge. Extraocular Movements: Extraocular movements intact. Conjunctiva/sclera: Conjunctivae normal.      Pupils: Pupils are equal, round, and reactive to light. Cardiovascular:      Rate and Rhythm: Normal rate and regular rhythm. Heart sounds: Normal heart sounds. No murmur heard. No friction rub. No gallop. Pulmonary:      Effort: Pulmonary effort is normal. No respiratory distress. Breath sounds: Normal breath sounds. No wheezing. Abdominal:      General: Bowel sounds are normal. There is no distension. Palpations: Abdomen is soft. Tenderness: There is no abdominal tenderness. Genitourinary:     Penis: Tenderness present. Testes: Normal.         Right: Right testis is descended. Left: Left testis is descended. Comments: +erection   Musculoskeletal:         General: Normal range of motion. Cervical back: Normal range of motion. Skin:     General: Skin is warm and dry. Capillary Refill: Capillary refill takes less than 2 seconds. Neurological:      General: No focal deficit present. Mental Status: He is alert. Psychiatric:         Mood and Affect: Mood normal.         Behavior: Behavior normal.           DIAGNOSTIC RESULTS   LABS:    Labs Reviewed   COMPREHENSIVE METABOLIC PANEL W/ REFLEX TO MG FOR LOW K - Abnormal; Notable for the following components:       Result Value    Glucose 103 (*)     Creatinine 0.6 (*)     Total Bilirubin 6.2 (*)     AST 59 (*)     All other components within normal limits   CBC WITH AUTO DIFFERENTIAL - Abnormal; Notable for the following components:    WBC 14.6 (*)     RBC 2.46 (*)     Hemoglobin 8.7 (*)     Hematocrit 24.5 (*)     MCH 35.3 (*)     RDW 24.2 (*)     Neutrophils Absolute 12.0 (*)     All other components within normal limits   RETICULOCYTES - Abnormal; Notable for the following components:    Retic Ct Pct 6.77 (*)     Immature Retic Fract 0.71 (*)     All other components within normal limits   SEDIMENTATION RATE - Abnormal; Notable for the following components:    Sed Rate 18 (*)     All other components within normal limits   CBC WITH AUTO DIFFERENTIAL - Abnormal; Notable for the following components:    WBC 11.4 (*)     RBC 2.10 (*)     Hemoglobin 7.5 (*)     Hematocrit 21.4 (*)     .7 (*)     MCH 35.7 (*)     RDW 24.2 (*)     Monocytes Absolute 1.5 (*)     All other components within normal limits   BASIC METABOLIC PANEL W/ REFLEX TO MG FOR LOW K - Abnormal; Notable for the following components:    CO2 20 (*)     Creatinine 0.6 (*)     All other components within normal limits   COVID-19 & INFLUENZA COMBO       When ordered only abnormal lab results are displayed. All other labs were within normal range or not returned as of this dictation. EKG: When ordered, EKG's are interpreted by the Emergency Department Physician in the absence of a cardiologist.  Please see their note for interpretation of EKG.     RADIOLOGY:   Non-plain film images such as CT, Ultrasound and MRI are read by the radiologist. Guillermo Almonte radiographic images are visualized and preliminarily interpreted by the ED Provider with the below findings:    XR CHEST PORTABLE    Result Date: 2/22/2023  EXAMINATION: ONE XRAY VIEW OF THE CHEST 2/22/2023 8:14 am COMPARISON: 02/17/2023 radiograph HISTORY: ORDERING SYSTEM PROVIDED HISTORY: Chest Discomfort TECHNOLOGIST PROVIDED HISTORY: Reason for exam:->Chest Discomfort Reason for Exam: chest discomfort FINDINGS: The cardiac silhouette is prominent, likely magnified by portable technique. Mediastinum and pulmonary vascular markings are normal.  No acute airspace abnormality. No significant skeletal finding. The lungs appear clear with no acute pulmonary finding. PROCEDURES      PROCEDURES, entire procedures are supervised directly by attending ED provider Dr Adan Abad      Penile Block:   penile block with 4 cc of 0.5% bupivacaine and 2% lidocaine without epinephrine in a 50-50 mix injected in equal portion at the 10:00 and 2:00 positions. Intracavernosal injection  Intracavernosal injection of phenylephrine. 500mcg/mL 1 injection in one of the Right corpora cavernosum and then the other injectiion then the left side. Patient tolerated procedure both sides with mild pain and only a drop of blood. About 15 minutes after the second cavernosal injection patient had complete resolution of his priapism. CRITICAL CARE TIME (.cctime)   Because of high probability of sudden clinical deterioration of the patient's condition and risk of further deterioration, critical care time required my full attention to the patient's condition; which included chart data review, documentation, medication ordering, reviewing the patient's old records, reevaluation patient's cardiac, pulmonary and neurological status. Reevaluation of vital signs. Consultations with ED attending and admitting physician. Ordering, interpreting reviewing diagnostic testing.   Therefore, I personally saw the patient and independently provided 50 minutes of non-concurrent critical care out of the total shared critical care time provided, direct attention to the patient's condition did not include time spent on procedures. 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      Chronic Conditions affecting Care:     EMERGENCY DEPARTMENT COURSE and DIFFERENTIAL DIAGNOSIS/MDM:   Vitals:    Vitals:    03/01/23 0708 03/01/23 0813 03/01/23 0843 03/01/23 0919   BP:    (!) 112/59   Pulse:    71   Resp: 16 18 16 16   Temp:    97.2 °F (36.2 °C)   TempSrc:    Oral   SpO2:       Weight:       Height:           Patient was given the following medications:  Medications   pseudoephedrine (SUDAFED) tablet 60 mg (60 mg Oral Given 3/1/23 1059)   oxyCODONE (ROXICODONE) immediate release tablet 20 mg (20 mg Oral Given 3/1/23 1037)   levalbuterol (XOPENEX) nebulizer solution 0.3 mg (has no administration in time range)   hydroxyurea (HYDREA) chemo capsule 1,000 mg (1,000 mg Oral Given 1/2/44 0851)   folic acid (FOLVITE) tablet 2 mg (2 mg Oral Given 3/1/23 0921)   apixaban (ELIQUIS) tablet 5 mg (5 mg Oral Given 3/1/23 0921)   0.9 % sodium chloride infusion ( IntraVENous New Bag 3/1/23 0529)   sodium chloride flush 0.9 % injection 5-40 mL (10 mLs IntraVENous Given 3/1/23 0818)   sodium chloride flush 0.9 % injection 5-40 mL (has no administration in time range)   0.9 % sodium chloride infusion (has no administration in time range)   potassium chloride (KLOR-CON M) extended release tablet 40 mEq (has no administration in time range)     Or   potassium bicarb-citric acid (EFFER-K) effervescent tablet 40 mEq (has no administration in time range)     Or   potassium chloride 10 mEq/100 mL IVPB (Peripheral Line) (has no administration in time range)   magnesium sulfate 2000 mg in 50 mL IVPB premix (has no administration in time range)   ondansetron (ZOFRAN-ODT) disintegrating tablet 4 mg (has no administration in time range)     Or   ondansetron (ZOFRAN) injection 4 mg (has no administration in time range)   polyethylene glycol (GLYCOLAX) packet 17 g (has no administration in time range)   acetaminophen (TYLENOL) tablet 650 mg (has no administration in time range)     Or   acetaminophen (TYLENOL) suppository 650 mg (has no administration in time range)   sildenafil (REVATIO) tablet 20 mg (20 mg Oral Given 3/1/23 0925)   HYDROmorphone (DILAUDID) injection 1 mg (1 mg IntraVENous Given 3/1/23 0813)   HYDROmorphone (DILAUDID) injection 0.5 mg (0.5 mg IntraVENous Given 2/28/23 1339)   phenylephrine (STEVE-SYNEPHRINE) 500 mcg/mL injection (FOR PRIAPISM) (0.5 mg IntraCAVernosal Given by Other 2/28/23 1448)   HYDROmorphone (DILAUDID) injection 1 mg (1 mg IntraVENous Given 2/28/23 1958)   0.9 % sodium chloride bolus (0 mLs IntraVENous Stopped 2/28/23 1921)   acetaminophen (TYLENOL) tablet 1,000 mg (1,000 mg Oral Given 2/28/23 1626)   ketorolac (TORADOL) injection 50 mg (50 mg IntraVENous Given 2/28/23 1627)       ED Course as of 03/01/23 1103   Tue Feb 28, 2023   1450 intracavernosal injection without complication. Pt requested no ace wrap  [AR]   4161 Discussed patient's symptoms with patient he advised he is still having pain and spite of receiving Dilaudid Tylenol and Toradol. He advised his pain is not adequately controlled he feels like his pain has moved from his penis to his hip. We did evaluate x-rays that were obtained at his last visit of his right hip which were grossly unremarkable. We discussed admission for pain control versus discharge to home. Patient advised he is leaning towards wanting to be admitted for pain control because he feels like this is just going to happen tomorrow I did discuss with patient that long-term this is not a good management plan for his chronic pain. He advised he is going to think about it for a few minutes. [AR]      ED Course User Index  [AR] Analia Peter PA-C        Is this patient to be included in the SEP-1 Core Measure due to severe sepsis or septic shock? No   Exclusion criteria - the patient is NOT to be included for SEP-1 Core Measure due to:  2+ SIRS criteria are not met    CONSULTS:  DIscussed with [facility]: Dr Jayleen Barron, advised that we will likely be needing to admit the patient for a pain crisis. He advised they will consult. Hospitalist, Admitting team     CC/HPI Summary, DDx, ED Course, and Reassessment: nontoxic, Moderate distress on initial arrival, vitals WNL, provided with ice packs in addition to pain control with IV Dilaudid. Patient is then provided with a penile block and intracavernosal injection, for resolution of priapism. Patient advised that his pain is now left his penis and his moved into his hips and his body. He advised he does not feel comfortable going home due to a subsequent cell crisis. Labs are evaluated patient's ordered Toradol and Tylenol as we have had resolution of acute initial process that brought him into the hospital.  I do not feel any further narcotics are warranted at this time. I consulted , per management plan to discuss pain management while inpatient. Consult to hospitalist, Dr Melissa Griffin, who accepts the patient in stable condition. I am the Primary Clinician of Record. FINAL IMPRESSION      1. Sickle cell pain crisis ([facility] [address]    2. 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     DISCHARGE MEDICATIONS:  Current Discharge Medication List          DISCONTINUED MEDICATIONS:  Current Discharge Medication List        STOP taking these medications       sildenafil (REVATIO) 20 MG tablet Comments:   Reason for Stopping:                      (Please note that portions of this note were completed with a voice recognition program.  Efforts were made to edit the dictations but occasionally words are mis-transcribed.)    Dayana Williamson PA-C (electronically signed)            Dayana Williamson PA-C  03/01/23 1108

## 2023-02-28 NOTE — PLAN OF CARE
Sickle cell pain crisis. Recurrent admission. Priapism. Recurrent issue.   S/p intracavernosal phenylephrine in the ED

## 2023-02-28 NOTE — ED PROVIDER NOTES
ED Attending Attestation Note    This patient was seen by the advanced practice provider. I personally saw the patient and performed a substantive portion of the visit including all aspects of the medical decision making. Briefly, 20 y.o. male presents with recurrent priapism. States is now occurring daily. Started at 10am today. Has appt later in march with urology. Focused exam:   Gen: 20 y.o. male, NAD  Appears to be in pain but otherwise no distress. Priapism noted on genital exam.  No other penile lesions. MDM:   Patient presenting for evaluation of priapism. We treated the priapism with phenylephrine injection with resolution. However, following resolution of his priapism he had significant pain that seem to migrate into the hip. Labs obtained showing white blood cell count of 14.6, reticulocyte count currently at 6.77, decreased from most recent labs performed 5 days ago showing reticulocyte count of 17.67 at that point. IV fluids, Toradol and Tylenol given per his pain protocol, in addition to the Dilaudid that had been given initially we were initially managing his priapism. He has had persistent intractable pain. We will plan to consult his heme-onc team and possibly admit for intractable pain and sickle cell crisis. For further details of the patient's emergency department visit, please see the advanced practice provider's documentation. Christopher Marr MD     This report has been produced using speech recognition software and may contain errors related to that system including errors in grammar, punctuation, and spelling, as well as words and phrases that may be inappropriate. If there are any questions or concerns please feel free to contact the dictating provider for clarification.         Christopher Marr MD  03/01/23 7251

## 2023-03-01 PROBLEM — D57.00 ACUTE SICKLE CELL CRISIS (HCC): Status: ACTIVE | Noted: 2023-03-01

## 2023-03-01 LAB
ANION GAP SERPL CALCULATED.3IONS-SCNC: 10 MMOL/L (ref 3–16)
ANISOCYTOSIS: ABNORMAL
BASOPHILS ABSOLUTE: 0.1 K/UL (ref 0–0.2)
BASOPHILS ABSOLUTE: 0.1 K/UL (ref 0–0.2)
BASOPHILS RELATIVE PERCENT: 0.9 %
BASOPHILS RELATIVE PERCENT: 1 %
BUN BLDV-MCNC: 12 MG/DL (ref 7–20)
CALCIUM SERPL-MCNC: 8.5 MG/DL (ref 8.3–10.6)
CHLORIDE BLD-SCNC: 107 MMOL/L (ref 99–110)
CO2: 20 MMOL/L (ref 21–32)
CREAT SERPL-MCNC: 0.6 MG/DL (ref 0.9–1.3)
EOSINOPHILS ABSOLUTE: 0 K/UL (ref 0–0.6)
EOSINOPHILS ABSOLUTE: 0.1 K/UL (ref 0–0.6)
EOSINOPHILS RELATIVE PERCENT: 0 %
EOSINOPHILS RELATIVE PERCENT: 1 %
GFR SERPL CREATININE-BSD FRML MDRD: >60 ML/MIN/{1.73_M2}
GLUCOSE BLD-MCNC: 84 MG/DL (ref 70–99)
HCT VFR BLD CALC: 21.4 % (ref 40.5–52.5)
HCT VFR BLD CALC: 24.5 % (ref 40.5–52.5)
HEMOGLOBIN: 7.5 G/DL (ref 13.5–17.5)
HEMOGLOBIN: 8.7 G/DL (ref 13.5–17.5)
HOWELL-JOLLY BODIES: ABNORMAL
IMMATURE RETIC FRACT: 0.71 (ref 0.21–0.37)
LYMPHOCYTES ABSOLUTE: 1.3 K/UL (ref 1–5.1)
LYMPHOCYTES ABSOLUTE: 4 K/UL (ref 1–5.1)
LYMPHOCYTES RELATIVE PERCENT: 34.7 %
LYMPHOCYTES RELATIVE PERCENT: 9 %
MACROCYTES: ABNORMAL
MCH RBC QN AUTO: 35.3 PG (ref 26–34)
MCH RBC QN AUTO: 35.7 PG (ref 26–34)
MCHC RBC AUTO-ENTMCNC: 35.2 G/DL (ref 31–36)
MCHC RBC AUTO-ENTMCNC: 35.5 G/DL (ref 31–36)
MCV RBC AUTO: 101.7 FL (ref 80–100)
MCV RBC AUTO: 99.4 FL (ref 80–100)
MONOCYTES ABSOLUTE: 1.2 K/UL (ref 0–1.3)
MONOCYTES ABSOLUTE: 1.5 K/UL (ref 0–1.3)
MONOCYTES RELATIVE PERCENT: 13.1 %
MONOCYTES RELATIVE PERCENT: 7.9 %
NEUTROPHILS ABSOLUTE: 12 K/UL (ref 1.7–7.7)
NEUTROPHILS ABSOLUTE: 5.7 K/UL (ref 1.7–7.7)
NEUTROPHILS RELATIVE PERCENT: 50.3 %
NEUTROPHILS RELATIVE PERCENT: 82.1 %
OVALOCYTES: ABNORMAL
PAPPENHEIMER BODIES: ABNORMAL
PDW BLD-RTO: 24.2 % (ref 12.4–15.4)
PDW BLD-RTO: 24.2 % (ref 12.4–15.4)
PLATELET # BLD: 389 K/UL (ref 135–450)
PLATELET # BLD: 415 K/UL (ref 135–450)
PLATELET SLIDE REVIEW: ADEQUATE
PMV BLD AUTO: 8.3 FL (ref 5–10.5)
PMV BLD AUTO: 8.9 FL (ref 5–10.5)
POIKILOCYTES: ABNORMAL
POLYCHROMASIA: ABNORMAL
POTASSIUM REFLEX MAGNESIUM: 4.2 MMOL/L (ref 3.5–5.1)
RBC # BLD: 2.1 M/UL (ref 4.2–5.9)
RBC # BLD: 2.46 M/UL (ref 4.2–5.9)
RETICULOCYTE ABSOLUTE COUNT: 0.17 M/UL
RETICULOCYTE COUNT PCT: 6.77 % (ref 0.5–2.18)
SCHISTOCYTES: ABNORMAL
SICKLE CELLS: ABNORMAL
SLIDE REVIEW: ABNORMAL
SODIUM BLD-SCNC: 137 MMOL/L (ref 136–145)
TARGET CELLS: ABNORMAL
WBC # BLD: 11.4 K/UL (ref 4–11)
WBC # BLD: 14.6 K/UL (ref 4–11)

## 2023-03-01 PROCEDURE — 80048 BASIC METABOLIC PNL TOTAL CA: CPT

## 2023-03-01 PROCEDURE — 36415 COLL VENOUS BLD VENIPUNCTURE: CPT

## 2023-03-01 PROCEDURE — 2580000003 HC RX 258: Performed by: INTERNAL MEDICINE

## 2023-03-01 PROCEDURE — 85025 COMPLETE CBC W/AUTO DIFF WBC: CPT

## 2023-03-01 PROCEDURE — 99223 1ST HOSP IP/OBS HIGH 75: CPT | Performed by: INTERNAL MEDICINE

## 2023-03-01 PROCEDURE — 6360000002 HC RX W HCPCS: Performed by: INTERNAL MEDICINE

## 2023-03-01 PROCEDURE — 1200000000 HC SEMI PRIVATE

## 2023-03-01 PROCEDURE — 6370000000 HC RX 637 (ALT 250 FOR IP): Performed by: INTERNAL MEDICINE

## 2023-03-01 RX ORDER — LIDOCAINE HYDROCHLORIDE 20 MG/ML
2 INJECTION, SOLUTION EPIDURAL; INFILTRATION; INTRACAUDAL; PERINEURAL ONCE
Status: DISCONTINUED | OUTPATIENT
Start: 2023-03-01 | End: 2023-03-05 | Stop reason: HOSPADM

## 2023-03-01 RX ADMIN — HYDROMORPHONE HYDROCHLORIDE 1 MG: 1 INJECTION, SOLUTION INTRAMUSCULAR; INTRAVENOUS; SUBCUTANEOUS at 19:34

## 2023-03-01 RX ADMIN — HYDROMORPHONE HYDROCHLORIDE 1 MG: 1 INJECTION, SOLUTION INTRAMUSCULAR; INTRAVENOUS; SUBCUTANEOUS at 12:12

## 2023-03-01 RX ADMIN — HYDROMORPHONE HYDROCHLORIDE 1 MG: 1 INJECTION, SOLUTION INTRAMUSCULAR; INTRAVENOUS; SUBCUTANEOUS at 05:23

## 2023-03-01 RX ADMIN — APIXABAN 5 MG: 5 TABLET, FILM COATED ORAL at 09:21

## 2023-03-01 RX ADMIN — APIXABAN 5 MG: 5 TABLET, FILM COATED ORAL at 21:20

## 2023-03-01 RX ADMIN — SODIUM CHLORIDE: 9 INJECTION, SOLUTION INTRAVENOUS at 21:28

## 2023-03-01 RX ADMIN — OXYCODONE HYDROCHLORIDE 20 MG: 15 TABLET ORAL at 06:38

## 2023-03-01 RX ADMIN — HYDROMORPHONE HYDROCHLORIDE 1 MG: 1 INJECTION, SOLUTION INTRAMUSCULAR; INTRAVENOUS; SUBCUTANEOUS at 08:13

## 2023-03-01 RX ADMIN — PSEUDOEPHEDRINE HCL 60 MG: 30 TABLET, FILM COATED ORAL at 05:29

## 2023-03-01 RX ADMIN — OXYCODONE HYDROCHLORIDE 20 MG: 15 TABLET ORAL at 02:05

## 2023-03-01 RX ADMIN — HYDROXYUREA 1000 MG: 500 CAPSULE ORAL at 21:20

## 2023-03-01 RX ADMIN — OXYCODONE HYDROCHLORIDE 20 MG: 15 TABLET ORAL at 13:34

## 2023-03-01 RX ADMIN — OXYCODONE HYDROCHLORIDE 20 MG: 15 TABLET ORAL at 21:20

## 2023-03-01 RX ADMIN — HYDROMORPHONE HYDROCHLORIDE 1 MG: 1 INJECTION, SOLUTION INTRAMUSCULAR; INTRAVENOUS; SUBCUTANEOUS at 14:59

## 2023-03-01 RX ADMIN — OXYCODONE HYDROCHLORIDE 20 MG: 15 TABLET ORAL at 10:37

## 2023-03-01 RX ADMIN — HYDROMORPHONE HYDROCHLORIDE 1 MG: 1 INJECTION, SOLUTION INTRAMUSCULAR; INTRAVENOUS; SUBCUTANEOUS at 23:00

## 2023-03-01 RX ADMIN — PSEUDOEPHEDRINE HCL 60 MG: 30 TABLET, FILM COATED ORAL at 10:59

## 2023-03-01 RX ADMIN — SODIUM CHLORIDE: 9 INJECTION, SOLUTION INTRAVENOUS at 13:19

## 2023-03-01 RX ADMIN — SODIUM CHLORIDE: 9 INJECTION, SOLUTION INTRAVENOUS at 05:29

## 2023-03-01 RX ADMIN — HYDROXYUREA 1000 MG: 500 CAPSULE ORAL at 09:25

## 2023-03-01 RX ADMIN — SILDENAFIL 20 MG: 20 TABLET, FILM COATED ORAL at 09:25

## 2023-03-01 RX ADMIN — FOLIC ACID 2 MG: 1 TABLET ORAL at 09:21

## 2023-03-01 RX ADMIN — HYDROMORPHONE HYDROCHLORIDE 1 MG: 1 INJECTION, SOLUTION INTRAMUSCULAR; INTRAVENOUS; SUBCUTANEOUS at 16:43

## 2023-03-01 RX ADMIN — SODIUM CHLORIDE, PRESERVATIVE FREE 10 ML: 5 INJECTION INTRAVENOUS at 08:18

## 2023-03-01 RX ADMIN — HYDROMORPHONE HYDROCHLORIDE 1 MG: 1 INJECTION, SOLUTION INTRAMUSCULAR; INTRAVENOUS; SUBCUTANEOUS at 00:09

## 2023-03-01 RX ADMIN — OXYCODONE HYDROCHLORIDE 20 MG: 15 TABLET ORAL at 18:01

## 2023-03-01 ASSESSMENT — PAIN DESCRIPTION - ONSET: ONSET: ON-GOING

## 2023-03-01 ASSESSMENT — PAIN SCALES - GENERAL
PAINLEVEL_OUTOF10: 9
PAINLEVEL_OUTOF10: 8
PAINLEVEL_OUTOF10: 3
PAINLEVEL_OUTOF10: 10
PAINLEVEL_OUTOF10: 9
PAINLEVEL_OUTOF10: 8
PAINLEVEL_OUTOF10: 9
PAINLEVEL_OUTOF10: 8
PAINLEVEL_OUTOF10: 9
PAINLEVEL_OUTOF10: 10
PAINLEVEL_OUTOF10: 8
PAINLEVEL_OUTOF10: 4
PAINLEVEL_OUTOF10: 5
PAINLEVEL_OUTOF10: 10
PAINLEVEL_OUTOF10: 10
PAINLEVEL_OUTOF10: 9
PAINLEVEL_OUTOF10: 8

## 2023-03-01 ASSESSMENT — PAIN DESCRIPTION - LOCATION
LOCATION: FLANK;GROIN
LOCATION: GROIN;HIP
LOCATION: GROIN;HIP
LOCATION: HIP
LOCATION: HIP
LOCATION: GROIN;HIP
LOCATION: HIP

## 2023-03-01 ASSESSMENT — PAIN DESCRIPTION - DESCRIPTORS
DESCRIPTORS: ACHING
DESCRIPTORS: ACHING;SHARP
DESCRIPTORS: ACHING;SHARP
DESCRIPTORS: ACHING
DESCRIPTORS: ACHING
DESCRIPTORS: ACHING;SHARP
DESCRIPTORS: ACHING;SHARP
DESCRIPTORS: CRAMPING;ACHING
DESCRIPTORS: ACHING

## 2023-03-01 ASSESSMENT — PAIN DESCRIPTION - ORIENTATION
ORIENTATION: RIGHT
ORIENTATION: RIGHT;LEFT
ORIENTATION: RIGHT
ORIENTATION: RIGHT;LEFT
ORIENTATION: RIGHT
ORIENTATION: RIGHT;LEFT
ORIENTATION: RIGHT
ORIENTATION: RIGHT;LEFT

## 2023-03-01 ASSESSMENT — PAIN SCALES - WONG BAKER
WONGBAKER_NUMERICALRESPONSE: 2
WONGBAKER_NUMERICALRESPONSE: 0
WONGBAKER_NUMERICALRESPONSE: 8
WONGBAKER_NUMERICALRESPONSE: 0;4

## 2023-03-01 ASSESSMENT — PAIN - FUNCTIONAL ASSESSMENT
PAIN_FUNCTIONAL_ASSESSMENT: ACTIVITIES ARE NOT PREVENTED

## 2023-03-01 ASSESSMENT — PAIN DESCRIPTION - DIRECTION: RADIATING_TOWARDS: HIP

## 2023-03-01 ASSESSMENT — PAIN DESCRIPTION - PAIN TYPE: TYPE: ACUTE PAIN;CHRONIC PAIN

## 2023-03-01 NOTE — PROGRESS NOTES
Patient reports 9/10 pain in right hip and groin. Prn medication administered for pain, see MAR. Call light in reach.

## 2023-03-01 NOTE — PROGRESS NOTES
Called urology regarding consult and Dr. Cindy Silva want to talk to md. Left message and awaiting call back.

## 2023-03-01 NOTE — PROGRESS NOTES
Patient reports pain in right hip rated 8/10. Prn medication administered for pain, see MAR. Patient requested prn sudafed, prn medication administered see MAR. Patient denies any other needs at this time. Call light in reach.

## 2023-03-01 NOTE — PROGRESS NOTES
Patient reports pain in right hip rated 9/10. Prn medication administered for pain, see MAR. Patient denies any other needs at this time. Call light in reach.

## 2023-03-01 NOTE — CONSULTS
[facility]    Consult Note      Requesting Physician:  Dr. Hussein Leo:  Sickle cell anemia      HISTORY OF PRESENT ILLNESS:    Mr. Sloan Keith  is a 20 y.o. male we are seeing in consultation for sickle cell disease, priapism and a recently diagnosed PE. He has has multiple hospital admissions for the same issue over the past several weeks. He is complaining of a persistent painful erection. He has a history of sickle cell and has frequent hospitalizations and ED visits for priapism where he often has to have it detumesced. He is supposed be taking Hydrea and sildenafil but he is noncompliant. He has been referred to the sickle cell/priapism clinic at [facility] and he has yet to go there. He states he has an appt later this month. He has been dealing with priapism issues for the last year. He states when he was about 7years old he had priapism issues that subsequently went away and he has not had any problems with this since up until about a year ago. ICD-10-CM    1. Sickle cell pain crisis ([facility] [address]  D57.00       2.  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)     Sickle cell anemia (HCC)        Past Surgical History:  Past Surgical History:   Procedure Laterality Date    ABSCESS DRAINAGE Left 2012    leg    SPLENECTOMY         Current Medications:  Current Facility-Administered Medications   Medication Dose Route Frequency Provider Last Rate Last Admin    pseudoephedrine (SUDAFED) tablet 60 mg  60 mg Oral Daily PRN Ace Deras MD   60 mg at 03/01/23 0529    oxyCODONE (ROXICODONE) immediate release tablet 20 mg  20 mg Oral Q4H PRN Cammy Garcia MD   20 mg at 03/01/23 3591    levalbuterol (XOPENEX) nebulizer solution 0.3 mg  0.3 mg Nebulization Q6H PRN Cammy Garcia MD        hydroxyurea (HYDREA) chemo capsule 1,000 mg  1,000 mg Oral BID Cammy Garcia MD   1,000 mg at 07/41/85 4552    folic acid (FOLVITE) tablet 2 mg  2 mg Oral Daily Cammy Garcia MD        apixaban [place]) tablet 5 mg  5 mg Oral BID Cammy Garcia MD   5 mg at 02/28/23 2141    0.9 % sodium chloride infusion   IntraVENous Continuous Cammy Garcia  mL/hr at 03/01/23 0529 New Bag at 03/01/23 0529    sodium chloride flush 0.9 % injection 5-40 mL  5-40 mL IntraVENous 2 times per day Cammy Garcia MD   10 mL at 03/01/23 0818    sodium chloride flush 0.9 % injection 5-40 mL  5-40 mL IntraVENous PRN Cammy Garcia MD        0.9 % sodium chloride infusion   IntraVENous PRN Cammy Garcia MD        potassium chloride (KLOR-CON M) extended release tablet 40 mEq  40 mEq Oral PRN Cammy Garcia MD        Or    potassium bicarb-citric acid (EFFER-K) effervescent tablet 40 mEq  40 mEq Oral PRN Cammy Garcia MD        Or    potassium chloride 10 mEq/100 mL IVPB (Peripheral Line)  10 mEq IntraVENous PRN Cammy Garcia MD        magnesium sulfate 2000 mg in 50 mL IVPB premix  2,000 mg IntraVENous PRN Cammy Garcia MD        ondansetron (ZOFRAN-ODT) disintegrating tablet 4 mg  4 mg Oral Q8H PRN Cammy Garcia MD        Or    ondansetron (ZOFRAN) injection 4 mg  4 mg IntraVENous Q6H PRN Cammy Garcia MD        polyethylene glycol (GLYCOLAX) packet 17 g  17 g Oral Daily PRN Cammy Garcia MD        acetaminophen (TYLENOL) tablet 650 mg  650 mg Oral Q6H PRN Cammy Garcia MD        Or    acetaminophen (TYLENOL) suppository 650 mg  650 mg Rectal Q6H PRN Cammy Garcia MD        sildenafil (REVATIO) tablet 20 mg  20 mg Oral Daily Cammy Garcia MD   20 mg at 02/28/23 7510    HYDROmorphone (DILAUDID) injection 1 mg  1 mg IntraVENous Q3H PRN Army Vasquez MD   1 mg at 03/01/23 0813       Allergies: Allergies   Allergen Reactions    Morphine Shortness Of Breath     Other reaction(s): Histamine-like Reaction  Has asthma exacerbation with morphine-histamine type reaction         Social History:  Social History     Socioeconomic History    Marital status: Single     Spouse name: Not on file    Number of children: 0    Years of education: Not on file    Highest education level: Not on file   Occupational History    Not on file   Tobacco Use    Smoking status: Never     Passive exposure: Never    Smokeless tobacco: Never   Vaping Use    Vaping Use: Never used   Substance and Sexual Activity    Alcohol use: Not Currently    Drug use: Never    Sexual activity: Not Currently     Partners: Female   Other Topics Concern    Not on file   Social History Narrative    Not on file     Social Determinants of Health     Financial Resource Strain: Not on file   Food Insecurity: Not on file   Transportation Needs: Not on file   Physical Activity: Not on file   Stress: Not on file   Social Connections: Not on file   Intimate Partner Violence: Not on file   Housing Stability: Not on file          Family History:  Family History   Problem Relation Age of Onset    Other Father         sarcoidosis       REVIEW OF SYSTEMS:      Constitutional: Denies fever, sweats, weight loss  Eyes: No visual changes or diplopia. No scleral icterus. Ent: No headaches, hearing loss or vertigo. No mouth sores or sore throat. Cardiovascular: No chest pain, dyspnea on exertion, palpitations or loss of consciousness. Respiratory: No cough or wheezing, no sputum production. No hemoptysis. Gastrointestinal: No abdominal pain, appetite loss, blood in stools. No change in bowel habits. Genitourinary: No dysuria, trouble voiding, or hematuria. Musculoskeletal: No generalized weakness. No joint complaints. Integumentary: No rash or pruritus. Neurological: No headache, diplopia. No change in gait, balance, or coordination. No paresthesias. Endocrine: No temperature intolerance. No excessive thirst, fluid intake, urination. Hematologic/lymphatic: No abnormal bruising or ecchymosis, blood clots or swollen lymph nodes. Allergic/immunologic: No nasal congestion or hives.         PHYSICAL EXAM:      Vitals:  Patient Vitals for the past 24 hrs:   BP Temp Temp src Pulse Resp SpO2 Height Weight   03/01/23 0813 -- -- -- -- 18 -- -- --   03/01/23 0708 -- -- -- -- 16 -- -- --   03/01/23 0523 -- -- -- -- 16 -- -- --   03/01/23 0235 -- -- -- -- 14 -- -- --   03/01/23 0145 110/60 98.1 °F (36.7 °C) Oral 75 16 98 % -- --   03/01/23 0039 -- -- -- -- 16 -- -- --   03/01/23 0009 -- -- -- -- 16 -- -- --   02/28/23 2211 -- -- -- -- 16 -- -- --   02/28/23 2058 113/72 98.4 °F (36.9 °C) Oral 68 14 98 % 5' 9\" (1.753 m) 184 lb 14.4 oz (83.9 kg)   02/28/23 2002 122/70 -- -- 72 16 98 % -- --   02/28/23 1802 114/62 -- -- 71 19 96 % -- --   02/28/23 1734 120/63 -- -- 69 17 95 % -- --   02/28/23 1657 120/64 -- -- 77 21 97 % -- --   02/28/23 1642 (!) 101/58 -- -- 79 16 92 % -- --   02/28/23 1602 139/61 -- -- 91 18 95 % -- --   02/28/23 1531 124/60 -- -- 72 15 93 % -- --   02/28/23 1501 116/69 -- -- 85 18 95 % -- --   02/28/23 1432 133/76 -- -- 82 22 95 % -- --   02/28/23 1405 125/71 -- -- 92 17 97 % -- --   02/28/23 1336 130/66 98.4 °F (36.9 °C) Oral 75 17 98 % -- --   02/28/23 1335 -- -- -- -- -- -- 5' 5\" (1.651 m) 190 lb (86.2 kg)           CONSTITUTIONAL: awake, alert, cooperative, no apparent distress   EYES: pupils equal, round and reactive to light, sclera clear and conjunctiva normal  ENT: Normocephalic, without obvious abnormality, atraumatic  NECK: supple, symmetrical, no jugular venous distension and no carotid bruits   HEMATOLOGIC/LYMPHATIC: no cervical, supraclavicular or axillary lymphadenopathy   LUNGS: no increased work of breathing and clear to auscultation   CARDIOVASCULAR: regular rate and rhythm, normal S1 and S2, no murmur noted  ABDOMEN: normal bowel sounds x 4, soft, non-distended, non-tender, no masses palpated, no hepatosplenomegaly   MUSCULOSKELETAL: full range of motion noted, tone is normal  NEUROLOGIC: awake, alert, oriented to name, place and time. Motor skills grossly intact. SKIN: Normal skin color, texture, turgor and no jaundice. appears intact   EXTREMITIES: no LE edema       DATA:    PT/INR:    Recent Labs     02/28/23  1640 02/25/23  0528 02/24/23  0533 02/23/23  0530 02/22/23  0813 02/18/23  0228 02/17/23  0305   PROT 8.0 6.5 5.8* 5.6* 6.8   < > 6.7   INR  --   --   --   --   --   --  1.25*    < > = values in this interval not displayed. PTT:    Recent Labs     02/17/23  0305   APTT 31.2       CMP:    Recent Labs     03/01/23  0553 02/28/23  1640    139   K 4.2 4.6    103   CO2 20* 25   BUN 12 9   PROT  --  8.0     Mg:  No results for input(s): MG in the last 720 hours. Lab Results   Component Value Date    CALCIUM 8.5 03/01/2023    PHOS 4.5 10/20/2021       CBC:    Recent Labs     03/01/23  0553 02/28/23  1640 02/25/23  0529 02/24/23  0903 02/23/23  0530   WBC 11.4* 14.6* 10.1 11.0 9.7   NEUTROABS 5.7 12.0* 5.1 5.8 5.7   LYMPHOPCT 34.7 9.0 37.0 34.0 36.0   RBC 2.10* 2.46* 2.34* 2.20* 1.93*   HGB 7.5* 8.7* 8.0* 7.8* 7.1*   HCT 21.4* 24.5* 24.1* 22.8* 20.5*   .7* 99.4 103.0* 103.6* 106.3*   MCH 35.7* 35.3* 34.3* 35.4* 36.8*   MCHC 35.2 35.5 33.3 34.2 34.7   RDW 24.2* 24.2* 21.9* 23.3* 26.4*    415 445 435 391        LDH:No results for input(s): LDH in the last 720 hours.       Radiology Review:  XR CHEST PORTABLE  Narrative: EXAMINATION:  ONE XRAY VIEW OF THE CHEST    2/22/2023 8:14 am    COMPARISON:  02/17/2023 radiograph    HISTORY:  ORDERING SYSTEM PROVIDED HISTORY: Chest Discomfort  TECHNOLOGIST PROVIDED HISTORY:  Reason for exam:->Chest Discomfort  Reason for Exam: chest discomfort    FINDINGS:  The cardiac silhouette is prominent, likely magnified by portable technique. Mediastinum and pulmonary vascular markings are normal.  No acute airspace  abnormality. No significant skeletal finding. Impression: The lungs appear clear with no acute pulmonary finding. 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   -Transfusion indicated if HGB <6 g/dL. -IVF and pain meds  -has appt with priapism specialist later this month    Thank you for asking me to see the patient.        Cecy Han MD  Please Contact Through [employer]

## 2023-03-01 NOTE — H&P
History and Physical        HISTORY OF PRESENT ILLNESS:     20year-old patient known to us from previous admissions, with history of sickle cell anemia and recurrent admissions for sickle cell pain crisis, associated with priapism. Followed by oncologist Dr. Arianna Montero, and due for follow-up with urology for surgery. Patient is supposed to be on Hydrea and sildenafil-not compliant with medications. Recent admission for PE on anticoagulation. He has a management plan in place and hence no pain medication prescriptions given on discharge. Presented back to the emergency department yesterday with complaints of pain and priapism. .  Got intracavernous phenylephrine. Patient had persistent pain; started on IV fluids and IV Dilaudid and admitted    Patient complains of recurrent priapism today; Sudafed given with no improvement, I have  requested urology consult. .  Getting Dilaudid and oxycodone 20 mg alternately;  complains of persistent pain. Complains of pain all over, increased left hip pain    Patient's father and unhappy with the care and was asking me to call [facility] for transfer request.         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 Prior to Admission medications    Medication Sig Start Date End Date Taking? Authorizing Provider   apixaban starter pack (ELIQUIS) 5 MG TBPK tablet Take 2 tablets by mouth 2 times daily for 7 days, THEN 1 tablet 2 times daily for 23 days.  Patient will take 2 tablets bid for 6.5 days as he had his first dose of Elqiuis in the hospital. 2/18/23 3/20/23  Maribel Flores MD   hydroxyurea (HYDREA) 500 MG chemo capsule Take 2 capsules by mouth 2 times daily 2/14/23   Ling Sutherland MD   pseudoephedrine (SUDAFED) 60 MG tablet Take 1 tablet by mouth daily as needed (priapism) 2/14/23   Ling Sutherland MD   folic acid (FOLVITE) 1 MG tablet Take 2 tablets by mouth daily  Patient not taking: Reported on 2/28/2023 9/16/21   CARMELITA Burton - CNP   oxyCODONE HCl (OXY-IR) 10 MG immediate release tablet Take 20 mg by mouth every 4 hours as needed for Pain.  Patient states he takes 20mg q 4 hours as needed    Historical Provider, MD   albuterol sulfate HFA (PROVENTIL;VENTOLIN;PROAIR) 108 (90 Base) MCG/ACT inhaler Albuterol HFA Inhaler 90 mcg/actuation  inhaled  2 puffs prn     Active    Historical Provider, MD   levalbuterol (XOPENEX) 0.31 MG/3ML nebulization Levalbuterol Nebulized    2 puffs prn     Active    Historical Provider, MD       Social History     Socioeconomic History    Marital status: Single     Spouse name: None    Number of children: 0    Years of education: None    Highest education level: None   Tobacco Use    Smoking status: Never     Passive exposure: Never    Smokeless tobacco: Never   Vaping Use    Vaping Use: Never used   Substance and Sexual Activity    Alcohol use: Not Currently    Drug use: Never    Sexual activity: Not Currently     Partners: Female       Family History   Problem Relation Age of Onset    Other Father         sarcoidosis       REVIEW OF SYSTEMS:   Constitutional: Negative for fever   HEENT: Negative for sore throat   Eyes: Negative for redness   Respiratory: Negative for dyspnea, cough   Cardiovascular: Negative for chest pain   Gastrointestinal: Negative for vomiting, diarrhea   Genitourinary: Positive for priapism  Musculoskeletal: Positive for pain all over  Skin: Negative for rash   Neurological: Negative for syncope   Hematological: Negative for adenopathy   Psychiatric/Behavorial: Negative for anxiety    On Physical Examination    Vitals:    03/01/23 0919   BP: (!) 112/59   Pulse: 71   Resp: 16   Temp: 97.2 °F (36.2 °C)   SpO2:        Gen: 43-year-old -American male , in acute distress secondary to pain all over . Awake alert and well-oriented  Eyes: PERRL. No sclera icterus. No conjunctival injection. ENT: No discharge. Pharynx clear. Neck: Trachea midline. Normal thyroid. Resp: No accessory muscle use. No crackles. No wheezes. No rhonchi. No dullness on percussion. CV: Regular rate. Regular rhythm. No murmur or rub. No edema. GI: Non-tender. Non-distended. No masses. No organomegaly. Normal bowel sounds. No hernia.  : Priapism   Skin: Warm and dry. No nodule on exposed extremities. No rash on exposed extremities. Lymph: No cervical LAD. No supraclavicular LAD. M/S: No cyanosis. No joint deformity. No clubbing. Intact peripheral pulses. Brisk cap refill, < 2 secs  Neuro: Awake. Reflexes 2+ symmetric bilaterally   Psych: Oriented x 3. No anxiety or agitation. CBC:   Recent Labs     02/28/23  1640 03/01/23  0553   WBC 14.6* 11.4*   HGB 8.7* 7.5*   HCT 24.5* 21.4*   MCV 99.4 101.7*    389     BMP:   Recent Labs     02/28/23  1640 03/01/23  0553    137   K 4.6 4.2    107   CO2 25 20*   BUN 9 12   CREATININE 0.6* 0.6*     LIVER PROFILE:   Recent Labs     02/28/23  1640   AST 59*   ALT 30   BILITOT 6.2*   ALKPHOS 83     PT/INR: No results for input(s): PROTIME, INR in the last 72 hours. APTT: No results for input(s): APTT in the last 72 hours. UA:No results for input(s): NITRITE, COLORU, PHUR, LABCAST, WBCUA, RBCUA, MUCUS, TRICHOMONAS, YEAST, BACTERIA, CLARITYU, SPECGRAV, LEUKOCYTESUR, UROBILINOGEN, BILIRUBINUR, BLOODU, GLUCOSEU, AMORPHOUS in the last 72 hours. Invalid input(s): [facility]    radiology  No orders to display     Old records from recent admissions reviewed        ASSESSMENT:    Principal Problem:    Sickle cell pain crisis ([facility] [address]  Resolved Problems:    * No resolved hospital problems.  *      PLAN:     #Sickle cell pain crisis  #Sickle cell anemia -Recurrent admissions for sickle cell pain crisis   - complains of severe pain all over  -longstanding issue of med non-compliance   -Heme-onc consulted  -Monitor H/H . Transfuse as needed. He has required transfusions in previous admissions  -Continue aggressive IV fluid hydration .  -On IV Dilaudid 1 mg every 3 hours as needed for pain , alternating with oxycodone 20 mg every 3 hours as needed for pain . Dose has been adjusted and frequency increased since  admission .  - Supposed to be on chronic Hydrea for sickle cell anemia. Concern about compliance. Continue Hydrea 1000 mg twice daily. # Priapism. -Recurrent issue. 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 Patient not compliant with prescribed sildenafil  -Patient having significant pain with priapism again today. Consult urology. - Continued on sildenafil, and Sudafed as needed. -Urology has recommended outpatient follow-up for definitive surgical management  -has had success in past with sudafed, alkalinization, ice packs and supplemental O2       #Pulmonary Embolism  -Recent diagnosis  Prior history of DVT  -   Started on Eliquis during last admission. Continue      #LFT elevation, chronic  --likely 2/2 sickle cell  -patient denies RUQ pain   -Monitor    #Mild leukocytosis  -Reactive secondary to sickle cell crisis  Monitor, improving     #Asthma  -without acute exacerbation   -continue home inhalers     #GERD  -no longer taking PPI      #Vitamin D deficiency     #History of splenectomy     # Pt  has a management plan in place         Continue IV fluids. Continue IV Dilaudid. dose has been increased, frequency has been changed ; will alternate Dilaudid with oxycodone 20 mg every 3 hours. Consult urology : I have already spoken to the urology PA and they will address the recurrent priapism. Continue anticoagulation for PE.     I have addressed all patient's father's concerns with  and she will talk to patient and his dad    I have placed a call to [facility] transfer center as patient/family requesting transfer to [facility] for further care. -->  Got a call back from [facility], and hospitalist agreed to accept this patient in transfer. -->   Addendum     3pm   Patient now refusing/declining transfer. He wants to stay here at [place].   Transfer center updated    Henry Robbins MD 3/1/2023 11:32 AM

## 2023-03-01 NOTE — PLAN OF CARE
Problem: Discharge Planning  Goal: Discharge to home or other facility with appropriate resources  Outcome: Progressing  Flowsheets  Taken 2/28/2023 2108  Discharge to home or other facility with appropriate resources: Identify barriers to discharge with patient and caregiver  Taken 2/28/2023 2058  Discharge to home or other facility with appropriate resources: Identify barriers to discharge with patient and caregiver     Problem: Pain  Goal: Verbalizes/displays adequate comfort level or baseline comfort level  Outcome: Progressing     Problem: Safety - Adult  Goal: Free from fall injury  Outcome: Progressing

## 2023-03-01 NOTE — PROGRESS NOTES
Patient requesting more pain medication. Patient educated on prn medication schedule. Patient verbalized understanding. Call light in reach.

## 2023-03-01 NOTE — PROGRESS NOTES
Patient with pain meds given alternating every 1.5 hours. Manager spoke with patient regarding concerns. Patient is resting, no extreme complaints of pain with the pain med regimen. Patient parents at bedside, and updated with patient request. Call light in reach, and able to make needs known.

## 2023-03-01 NOTE — CONSULTS
[facility]1/2023  [facility]    Reason for Consult:  Priapism  Requesting Physician:  Theron Ricketts      History Obtained From:  patient, electronic medical record    HISTORY OF PRESENT ILLNESS:                The patient is a 20 y.o. male who presents with priapism. He had been admitted to the hospital with hip pain and sickle crisis. He is known to the service for frequent episodes of priapism. His current presentation is now several hours old. I saw him at 12:40, 1:05 and 1:30 and two pm.  Care team was waiting for meds to be sent up form pharmacy. By the time I saw him at 2 pm the priapism had resolved spontaneously with ice compresses.     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)     Sickle cell anemia (HCC)      Past Surgical History:        Procedure Laterality Date    ABSCESS DRAINAGE Left 2012    leg    SPLENECTOMY       Current Medications:   Current Facility-Administered Medications: oxyCODONE (ROXICODONE) immediate release tablet 20 mg, 20 mg, Oral, Q3H PRN  lidocaine PF 2 % injection 2 mL, 2 mL, IntraDERmal, Once  phenylephrine (STEVE-SYNEPHRINE) 100 mcg/mL injection (FOR PRIAPISM), 200 mcg, IntraCAVernosal, Once  phenylephrine (STEVE-SYNEPHRINE) 100 mcg/mL injection (FOR PRIAPISM), 200 mcg, IntraCAVernosal, Once  pseudoephedrine (SUDAFED) tablet 60 mg, 60 mg, Oral, Daily PRN  levalbuterol (XOPENEX) nebulizer solution 0.3 mg, 0.3 mg, Nebulization, Q6H PRN  hydroxyurea (HYDREA) chemo capsule 1,000 mg, 1,000 mg, Oral, BID  folic acid (FOLVITE) tablet 2 mg, 2 mg, Oral, Daily  apixaban (ELIQUIS) tablet 5 mg, 5 mg, Oral, BID  0.9 % sodium chloride infusion, , IntraVENous, Continuous  sodium chloride flush 0.9 % injection 5-40 mL, 5-40 mL, IntraVENous, 2 times per day  sodium chloride flush 0.9 % injection 5-40 mL, 5-40 mL, IntraVENous, PRN  0.9 % sodium chloride infusion, , IntraVENous, PRN  potassium chloride (KLOR-CON M) extended release tablet 40 mEq, 40 mEq, Oral, PRN **OR** potassium bicarb-citric acid (EFFER-K) effervescent tablet 40 mEq, 40 mEq, Oral, PRN **OR** potassium chloride 10 mEq/100 mL IVPB (Peripheral Line), 10 mEq, IntraVENous, PRN  magnesium sulfate 2000 mg in 50 mL IVPB premix, 2,000 mg, IntraVENous, PRN  ondansetron (ZOFRAN-ODT) disintegrating tablet 4 mg, 4 mg, Oral, Q8H PRN **OR** ondansetron (ZOFRAN) injection 4 mg, 4 mg, IntraVENous, Q6H PRN  polyethylene glycol (GLYCOLAX) packet 17 g, 17 g, Oral, Daily PRN  acetaminophen (TYLENOL) tablet 650 mg, 650 mg, Oral, Q6H PRN **OR** acetaminophen (TYLENOL) suppository 650 mg, 650 mg, Rectal, Q6H PRN  sildenafil (REVATIO) tablet 20 mg, 20 mg, Oral, Daily  HYDROmorphone (DILAUDID) injection 1 mg, 1 mg, IntraVENous, Q3H PRN  Allergies: Allergies   Allergen Reactions    Morphine Shortness Of Breath     Other reaction(s): Histamine-like Reaction  Has asthma exacerbation with morphine-histamine type reaction       Social History:  Reviewed, non contributory    Family History:  Reviewed, non contributory      REVIEW OF SYSTEMS:    12 point ROS has been completed and reviewed    PHYSICAL EXAM:    VITALS:  BP (!) 112/59   Pulse 71   Temp 97.2 °F (36.2 °C) (Oral)   Resp 16   Ht 5' 9\" (1.753 m)   Wt 184 lb 14.4 oz (83.9 kg)   SpO2 93%   BMI 27.30 kg/m²   H&N: Sclera normal, no masses, trachea midline, no bruit  CVS: Normal rate and rhythm, no murmurs or rubs, peripheral pulses equal, no clubbing or cyanosis. RESP: Breath sounds equal bilateral, few rhonchi. ABDO: Soft, non-tender, bowel sounds active, no organomegaly, no hernias. LYMPH:  No lymphadenopathy. Skin: Warm dry and intact. : No CVAT, external genitalia with low flow priapism, left curvature, no discharge.   MSK: Grossly normal for patient  CAROLYNN: Grossly normal for patient  PSY: No acute changes noted in psychosocial assessment. DATA:    CBC:   Lab Results   Component Value Date/Time    WBC 11.4 03/01/2023 05:53 AM    RBC 2.10 03/01/2023 05:53 AM    HGB 7.5 03/01/2023 05:53 AM    HCT 21.4 03/01/2023 05:53 AM    .7 03/01/2023 05:53 AM    MCH 35.7 03/01/2023 05:53 AM    MCHC 35.2 03/01/2023 05:53 AM    RDW 24.2 03/01/2023 05:53 AM     03/01/2023 05:53 AM    MPV 8.3 03/01/2023 05:53 AM     BMP:    Lab Results   Component Value Date/Time     03/01/2023 05:53 AM    K 4.2 03/01/2023 05:53 AM     03/01/2023 05:53 AM    CO2 20 03/01/2023 05:53 AM    BUN 12 03/01/2023 05:53 AM    LABALBU 4.8 02/28/2023 04:40 PM    CREATININE 0.6 03/01/2023 05:53 AM    CALCIUM 8.5 03/01/2023 05:53 AM    GFRAA >60 08/29/2022 02:12 AM    LABGLOM >60 03/01/2023 05:53 AM    GLUCOSE 84 03/01/2023 05:53 AM     U/A:    Lab Results   Component Value Date/Time    COLORU Yellow 02/22/2023 12:14 PM    PROTEINU 100 02/22/2023 12:14 PM    PHUR 6.5 02/22/2023 12:14 PM    WBCUA 0-2 02/22/2023 12:14 PM    RBCUA 3-4 02/22/2023 12:14 PM    MUCUS 1+ 05/27/2022 07:00 AM    BACTERIA 1+ 02/22/2023 12:14 PM    CLARITYU Clear 02/22/2023 12:14 PM    SPECGRAV 1.010 02/22/2023 12:14 PM    LEUKOCYTESUR Negative 02/22/2023 12:14 PM    UROBILINOGEN 1.0 02/22/2023 12:14 PM    BILIRUBINUR SMALL 02/22/2023 12:14 PM    BLOODU MODERATE 02/22/2023 12:14 PM    GLUCOSEU Negative 02/22/2023 12:14 PM    AMORPHOUS Rare 03/04/2022 07:53 PM       IMPRESSION/RECOMMENDATIONS:      Low flow priapism related to sickle crisis. The priapism has spontaneously resolved with ice. At the time I had the meds available there was no indication to proceed with bedside injections. PLAN:  Continue to monitor the situation. Meds have been left in his room in the locked drug cabinet. Sickle cell management per medical team.    Thank you for asking me to see this interesting patient.     Inocencia Altamirano MD

## 2023-03-01 NOTE — CARE COORDINATION
Case Management Assessment  Initial Evaluation    Date/Time of Evaluation: 3/1/2023 1:58 PM  Assessment Completed by: Oneida Johnson RN    If patient is discharged prior to next notation, then this note serves as note for discharge by case management. Patient Name: [facility]                   YOB: 2000  Diagnosis: Sickle cell pain crisis ([facility] [address] [D57.00]  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 ([facility] [address] [D57.1, N48.32]                   Date / Time: 2/28/2023  1:41 PM    Patient Admission Status: Inpatient   Readmission Risk (Low < 19, Mod (19-27), High > 27): Readmission Risk Score: 34.3    Current PCP: Codie Mazariegos MD  PCP verified by CM? Yes (Dr. Chong Primrose)    Chart Reviewed: Yes      History Provided by: Patient  Patient Orientation: Alert and Oriented    Patient Cognition: Alert    Hospitalization in the last 30 days (Readmission):  Yes    If yes, Readmission Assessment in CM Navigator will be completed. Advance Directives:      Code Status: Full Code   Patient's Primary Decision Maker is: Legal Next of Kin    Primary Decision Maker: [address] - Parent - [phone]    Discharge Planning:    Patient lives with: Parent Type of Home: House  Primary Care Giver: Self  Patient Support Systems include: Family Members   Current Financial resources: Other (Comment) ([employer])  Current community resources: None  Current services prior to admission: None            Current DME:              Type of Home Care services:  None    ADLS  Prior functional level: Independent in ADLs/IADLs  Current functional level: Independent in ADLs/IADLs    PT AM-PAC:   /24  OT AM-PAC:   /24    Family can provide assistance at DC: Yes  Would you like Case Management to discuss the discharge plan with any other family members/significant others, and if so, who?  Yes (Mom)  Plans to Return to Present Housing: Yes  Other Identified Issues/Barriers to RETURNING to current housing: pain  Potential Assistance needed at discharge: N/A            Potential DME:    Patient expects to discharge to: [facility] for transportation at discharge:      Financial    Payor: Agustin Asp / Plan: Agustin Asp / Product Type: *No Product type* /     Does insurance require precert for SNF: Yes    Potential assistance Purchasing Medications: No  Meds-to-Beds request:        [facility] [id number] Terry Ramos, [address] [facility] [phone] - F [phone]  [address] [facility] [address]  Phone: [phone] Fax: [address]  [address] [facility] [address] [facility]  Phone: [phone] Fax: [phone]    [facility] [address] [facility] [phone] Gonzalo Hernández [phone]  [address] [facility] [address]  Phone: [phone] Fax: [phone]      Notes:    Factors facilitating achievement of predicted outcomes: Family support    Barriers to discharge: Limited participation    Additional Case Management Notes: Reviewed chart and met with pt at bedside. Role of CM explained. States lives home with parents and when not I crisis is fully indep and works part time. Anticipate no needs but will follow. The Plan for Transition of Care is related to the following treatment goals of Sickle cell pain crisis ([facility] [address] [D57.00]  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 ([facility] [address] [D57.1, C79.95]    IF APPLICABLE: The Patient and/or patient representative Javier and his family were provided with a choice of provider and agrees with the discharge plan. Freedom of choice list with basic dialogue that supports the patient's individualized plan of care/goals and shares the quality data associated with the providers was provided to:     Patient Representative Name:       The Patient and/or Patient Representative Agree with the Discharge Plan?       Perlita Henderson RN  Case Management Department  Ph: [phone] Fax: [phone]

## 2023-03-01 NOTE — ED NOTES
Report given to [facility] on [address], RN  02/28/23 2028

## 2023-03-01 NOTE — PROGRESS NOTES
Patient admitted to room __219__ from ER. Patient oriented to room, call light, bed rails, phone, lights and bathroom. Patient instructed about the schedule of the day including: vital sign frequency, lab draws, possible tests, frequency of MD and staff rounds, daily weights, I &O's and prescribed diet. Bed alarm deferred patient low fall risk and refuses alarm. Telemetry box in place, patient aware of placement and reason. Bed locked, in lowest position, side rails up 2/4, call light within reach. Recliner Assessment:     Patient is able to demonstrate the ability to move from a reclining position to an upright position within the recliner. 4 Eyes Skin Assessment     The patient is being assess for   Admission    I agree that 2 RN's have performed a thorough Head to Toe Skin Assessment on the patient. ALL assessment sites listed below have been assessed. Areas assessed for pressure by both nurses:   [x]   Head, Face, and Ears   [x]   Shoulders, Back, and Chest, Abdomen  [x]   Arms, Elbows, and Hands   [x]   Coccyx, Sacrum, and Ischium  [x]   Legs, Feet, and Heels        Skin Assessed Under all Medical Devices by both nurses:  na               All Mepilex Borders were peeled back and area peeked at by both nurses:  No: na  Please list where Mepilex Borders are located:  na             **SHARE this note so that the co-signing nurse is able to place an eSignature**    Co-signer eSignature: Electronically signed by Ezequiel Bourne RN on 3/1/23 at 6:03 AM EST    Does the Patient have Skin Breakdown related to pressure?   No            Shaun Prevention initiated:  NA   Wound Care Orders initiated:  NA      [facility] nurse consulted for Pressure Injury (Stage 3,4, Unstageable, DTI, NWPT, Complex wounds)and New or Established Ostomies:  NA      Primary Nurse eSignature: Electronically signed by Clemente Hardin RN on 3/1/23 at 5:50 AM EST

## 2023-03-01 NOTE — PROGRESS NOTES
Admission assessment complete. Alert and oriented. Patient reports pain in right hip rated 9/10. Prn paid medication administered, see MAR. Snack and drink provided to patient. Patient denies any other needs at this time. Bed in lowest position. Side rails up x2. Call light in reach.

## 2023-03-01 NOTE — PROGRESS NOTES
Patient complains of pain 9/10 in right hip. Prn medication administered for pain, see MAR. Patient denies any other needs at this time. Call light in reach.

## 2023-03-01 NOTE — PROGRESS NOTES
Called [facility] regarding cancelling transfer to [facility] per primary nurse, Jayde Garcia. Talked to Sean Hopkins at [facility]. Transferred cancelled.

## 2023-03-02 LAB
ANION GAP SERPL CALCULATED.3IONS-SCNC: 9 MMOL/L (ref 3–16)
BASOPHILS ABSOLUTE: 0.1 K/UL (ref 0–0.2)
BASOPHILS RELATIVE PERCENT: 1.1 %
BUN BLDV-MCNC: 7 MG/DL (ref 7–20)
CALCIUM SERPL-MCNC: 8.9 MG/DL (ref 8.3–10.6)
CHLORIDE BLD-SCNC: 105 MMOL/L (ref 99–110)
CO2: 22 MMOL/L (ref 21–32)
CREAT SERPL-MCNC: 0.6 MG/DL (ref 0.9–1.3)
EOSINOPHILS ABSOLUTE: 0.2 K/UL (ref 0–0.6)
EOSINOPHILS RELATIVE PERCENT: 2.4 %
GFR SERPL CREATININE-BSD FRML MDRD: >60 ML/MIN/{1.73_M2}
GLUCOSE BLD-MCNC: 122 MG/DL (ref 70–99)
HCT VFR BLD CALC: 22.5 % (ref 40.5–52.5)
HEMOGLOBIN: 7.8 G/DL (ref 13.5–17.5)
LYMPHOCYTES ABSOLUTE: 3.8 K/UL (ref 1–5.1)
LYMPHOCYTES RELATIVE PERCENT: 38.6 %
MCH RBC QN AUTO: 36.5 PG (ref 26–34)
MCHC RBC AUTO-ENTMCNC: 34.9 G/DL (ref 31–36)
MCV RBC AUTO: 104.8 FL (ref 80–100)
MONOCYTES ABSOLUTE: 1.5 K/UL (ref 0–1.3)
MONOCYTES RELATIVE PERCENT: 14.9 %
NEUTROPHILS ABSOLUTE: 4.2 K/UL (ref 1.7–7.7)
NEUTROPHILS RELATIVE PERCENT: 43 %
PDW BLD-RTO: 23.6 % (ref 12.4–15.4)
PLATELET # BLD: 385 K/UL (ref 135–450)
PMV BLD AUTO: 8.5 FL (ref 5–10.5)
POTASSIUM REFLEX MAGNESIUM: 4.6 MMOL/L (ref 3.5–5.1)
RBC # BLD: 2.15 M/UL (ref 4.2–5.9)
SODIUM BLD-SCNC: 136 MMOL/L (ref 136–145)
WBC # BLD: 9.8 K/UL (ref 4–11)

## 2023-03-02 PROCEDURE — 85025 COMPLETE CBC W/AUTO DIFF WBC: CPT

## 2023-03-02 PROCEDURE — 6360000002 HC RX W HCPCS: Performed by: INTERNAL MEDICINE

## 2023-03-02 PROCEDURE — 6370000000 HC RX 637 (ALT 250 FOR IP): Performed by: INTERNAL MEDICINE

## 2023-03-02 PROCEDURE — 80048 BASIC METABOLIC PNL TOTAL CA: CPT

## 2023-03-02 PROCEDURE — 6370000000 HC RX 637 (ALT 250 FOR IP)

## 2023-03-02 PROCEDURE — 1200000000 HC SEMI PRIVATE

## 2023-03-02 PROCEDURE — 99232 SBSQ HOSP IP/OBS MODERATE 35: CPT | Performed by: INTERNAL MEDICINE

## 2023-03-02 PROCEDURE — 36415 COLL VENOUS BLD VENIPUNCTURE: CPT

## 2023-03-02 PROCEDURE — 2580000003 HC RX 258: Performed by: INTERNAL MEDICINE

## 2023-03-02 RX ORDER — DIMETHICONE, OXYBENZONE, AND PADIMATE O 2; 2.5; 6.6 G/100G; G/100G; G/100G
STICK TOPICAL
Status: COMPLETED
Start: 2023-03-02 | End: 2023-03-02

## 2023-03-02 RX ADMIN — HYDROMORPHONE HYDROCHLORIDE 1 MG: 1 INJECTION, SOLUTION INTRAMUSCULAR; INTRAVENOUS; SUBCUTANEOUS at 05:22

## 2023-03-02 RX ADMIN — FOLIC ACID 2 MG: 1 TABLET ORAL at 08:37

## 2023-03-02 RX ADMIN — HYDROMORPHONE HYDROCHLORIDE 1 MG: 1 INJECTION, SOLUTION INTRAMUSCULAR; INTRAVENOUS; SUBCUTANEOUS at 23:56

## 2023-03-02 RX ADMIN — OXYCODONE HYDROCHLORIDE 20 MG: 15 TABLET ORAL at 06:36

## 2023-03-02 RX ADMIN — SODIUM CHLORIDE: 9 INJECTION, SOLUTION INTRAVENOUS at 14:47

## 2023-03-02 RX ADMIN — HYDROMORPHONE HYDROCHLORIDE 1 MG: 1 INJECTION, SOLUTION INTRAMUSCULAR; INTRAVENOUS; SUBCUTANEOUS at 02:18

## 2023-03-02 RX ADMIN — OXYCODONE HYDROCHLORIDE 20 MG: 15 TABLET ORAL at 22:35

## 2023-03-02 RX ADMIN — HYDROMORPHONE HYDROCHLORIDE 1 MG: 1 INJECTION, SOLUTION INTRAMUSCULAR; INTRAVENOUS; SUBCUTANEOUS at 14:45

## 2023-03-02 RX ADMIN — OXYCODONE HYDROCHLORIDE 20 MG: 15 TABLET ORAL at 13:12

## 2023-03-02 RX ADMIN — OXYCODONE HYDROCHLORIDE 20 MG: 15 TABLET ORAL at 03:37

## 2023-03-02 RX ADMIN — HYDROMORPHONE HYDROCHLORIDE 1 MG: 1 INJECTION, SOLUTION INTRAMUSCULAR; INTRAVENOUS; SUBCUTANEOUS at 17:47

## 2023-03-02 RX ADMIN — APIXABAN 5 MG: 5 TABLET, FILM COATED ORAL at 21:32

## 2023-03-02 RX ADMIN — SILDENAFIL 20 MG: 20 TABLET, FILM COATED ORAL at 08:36

## 2023-03-02 RX ADMIN — SODIUM CHLORIDE: 9 INJECTION, SOLUTION INTRAVENOUS at 06:35

## 2023-03-02 RX ADMIN — OXYCODONE HYDROCHLORIDE 20 MG: 15 TABLET ORAL at 00:30

## 2023-03-02 RX ADMIN — HYDROMORPHONE HYDROCHLORIDE 1 MG: 1 INJECTION, SOLUTION INTRAMUSCULAR; INTRAVENOUS; SUBCUTANEOUS at 20:48

## 2023-03-02 RX ADMIN — HYDROXYUREA 1000 MG: 500 CAPSULE ORAL at 21:33

## 2023-03-02 RX ADMIN — SODIUM CHLORIDE: 9 INJECTION, SOLUTION INTRAVENOUS at 21:36

## 2023-03-02 RX ADMIN — APIXABAN 5 MG: 5 TABLET, FILM COATED ORAL at 08:37

## 2023-03-02 RX ADMIN — OXYCODONE HYDROCHLORIDE 20 MG: 15 TABLET ORAL at 19:27

## 2023-03-02 RX ADMIN — HYDROXYUREA 1000 MG: 500 CAPSULE ORAL at 08:37

## 2023-03-02 RX ADMIN — HYDROMORPHONE HYDROCHLORIDE 1 MG: 1 INJECTION, SOLUTION INTRAMUSCULAR; INTRAVENOUS; SUBCUTANEOUS at 08:37

## 2023-03-02 RX ADMIN — Medication: at 06:37

## 2023-03-02 RX ADMIN — OXYCODONE HYDROCHLORIDE 20 MG: 15 TABLET ORAL at 16:16

## 2023-03-02 RX ADMIN — PSEUDOEPHEDRINE HCL 60 MG: 30 TABLET, FILM COATED ORAL at 10:19

## 2023-03-02 RX ADMIN — HYDROMORPHONE HYDROCHLORIDE 1 MG: 1 INJECTION, SOLUTION INTRAMUSCULAR; INTRAVENOUS; SUBCUTANEOUS at 11:40

## 2023-03-02 RX ADMIN — SODIUM CHLORIDE, PRESERVATIVE FREE 10 ML: 5 INJECTION INTRAVENOUS at 08:37

## 2023-03-02 RX ADMIN — OXYCODONE HYDROCHLORIDE 20 MG: 15 TABLET ORAL at 10:06

## 2023-03-02 ASSESSMENT — PAIN SCALES - GENERAL
PAINLEVEL_OUTOF10: 8
PAINLEVEL_OUTOF10: 8
PAINLEVEL_OUTOF10: 7
PAINLEVEL_OUTOF10: 8

## 2023-03-02 ASSESSMENT — PAIN DESCRIPTION - LOCATION
LOCATION: HIP
LOCATION: GROIN;HIP
LOCATION: GROIN;HIP
LOCATION: HIP

## 2023-03-02 ASSESSMENT — PAIN DESCRIPTION - PAIN TYPE
TYPE: ACUTE PAIN;CHRONIC PAIN
TYPE: ACUTE PAIN;CHRONIC PAIN

## 2023-03-02 ASSESSMENT — PAIN DESCRIPTION - DESCRIPTORS
DESCRIPTORS: ACHING
DESCRIPTORS: ACHING;DISCOMFORT
DESCRIPTORS: ACHING
DESCRIPTORS: ACHING;DISCOMFORT
DESCRIPTORS: ACHING
DESCRIPTORS: ACHING;DISCOMFORT
DESCRIPTORS: ACHING

## 2023-03-02 ASSESSMENT — PAIN DESCRIPTION - ORIENTATION
ORIENTATION: RIGHT

## 2023-03-02 ASSESSMENT — PAIN DESCRIPTION - FREQUENCY
FREQUENCY: CONTINUOUS
FREQUENCY: CONTINUOUS

## 2023-03-02 ASSESSMENT — PAIN DESCRIPTION - ONSET
ONSET: ON-GOING
ONSET: ON-GOING

## 2023-03-02 NOTE — PROGRESS NOTES
PRN pain medication given for pain per pt. Request, rates 8/10 to hip/groin. Denies further needs at this time. Call light within reach.

## 2023-03-02 NOTE — PLAN OF CARE
Problem: Pain  Goal: Verbalizes/displays adequate comfort level or baseline comfort level  3/1/2023 2306 by Matt Andrews RN  Outcome: Progressing  3/1/2023 1912 by Concha Best RN  Outcome: Progressing     Problem: Safety - Adult  Goal: Free from fall injury  Outcome: Progressing

## 2023-03-02 NOTE — PLAN OF CARE
Problem: Pain  Goal: Verbalizes/displays adequate comfort level or baseline comfort level  Outcome: Progressing   Pain med given every 1.5 hours, with pain under better control with regimen.

## 2023-03-02 NOTE — PROGRESS NOTES
PRN oxycodone given for pain still rating 8 out of 10 in right hip. See MAR for administration details.

## 2023-03-02 NOTE — FLOWSHEET NOTE
03/01/23 2115   Vital Signs   Temp 98 °F (36.7 °C)   Temp Source Oral   Heart Rate 82   Heart Rate Source Monitor   Resp 16   BP (!) 100/55   MAP (Calculated) 70   Pain Assessment   Pain Assessment 0-10   Pain Level 9   Pain Location Groin; Hip   Pain Orientation Right   Pain Descriptors Aching   Pain Radiating Towards hip   Oxygen Therapy   SpO2 94 %   O2 Device None (Room air)   PM assessment complete. Medications given as ordered. Prn medication given for pain. [place] [facility] sandwich provided per pt request. Vital signs stable, no signs symptoms of distress. Plan of care reviewed. No other needs identified at this time.

## 2023-03-02 NOTE — PROGRESS NOTES
IM Progress Note    Admit Date:  2/28/2023    Admitted with sickle cell pain crisis and priapism    Subjective:  Mr. Jeanette Schuler looks a little better today, not in any distress . he is more sleepy, complains of persistent left hip pain getting medications as scheduled. Objective:   BP (!) 107/59   Pulse 76   Temp 97.9 °F (36.6 °C) (Oral)   Resp 18   Ht 5' 9\" (1.753 m)   Wt 184 lb 14.4 oz (83.9 kg)   SpO2 95%   BMI 27.30 kg/m²     Intake/Output Summary (Last 24 hours) at 3/2/2023 1154  Last data filed at 3/2/2023 1009  Gross per 24 hour   Intake 6703.27 ml   Output 2375 ml   Net 4328.27 ml         Physical Exam:  Gen: 76-year-old -American male , in no distress. He is sleepy today. Still having pain  Awakens easily. alert and well-oriented  Eyes: PERRL. No sclera icterus. No conjunctival injection. ENT: No discharge. Pharynx clear. Neck: Trachea midline. Normal thyroid. Resp: No accessory muscle use. No crackles. No wheezes. No rhonchi. No dullness on percussion. CV: Regular rate. Regular rhythm. No murmur or rub. No edema. GI: Non-tender. Non-distended. No masses. No organomegaly. Normal bowel sounds. No hernia.  : Deferred  Skin: Warm and dry. No nodule on exposed extremities. No rash on exposed extremities. Lymph: No cervical LAD. No supraclavicular LAD. M/S: No cyanosis. No joint deformity. No clubbing. Intact peripheral pulses. Brisk cap refill, < 2 secs  Neuro: Awake. Reflexes 2+ symmetric bilaterally   Psych: Oriented x 3. No anxiety or agitation.        Medications:   Scheduled Meds:   lidocaine PF  2 mL IntraDERmal Once    phenylephrine  200 mcg IntraCAVernosal Once    phenylephrine  200 mcg IntraCAVernosal Once    hydroxyurea  1,000 mg Oral BID    folic acid  2 mg Oral Daily    apixaban  5 mg Oral BID    sodium chloride flush  5-40 mL IntraVENous 2 times per day    sildenafil  20 mg Oral Daily       Continuous Infusions:   sodium chloride 125 mL/hr at 03/02/23 2460 sodium chloride         Data:  CBC:   Recent Labs     02/28/23  1640 03/01/23  0553 03/02/23  0525   WBC 14.6* 11.4* 9.8   RBC 2.46* 2.10* 2.15*   HGB 8.7* 7.5* 7.8*   HCT 24.5* 21.4* 22.5*   MCV 99.4 101.7* 104.8*   RDW 24.2* 24.2* 23.6*    389 385     BMP:   Recent Labs     02/28/23  1640 03/01/23  0553 03/02/23  0525    137 136   K 4.6 4.2 4.6    107 105   CO2 25 20* 22   BUN 9 12 7   CREATININE 0.6* 0.6* 0.6*     BNP: No results for input(s): BNP in the last 72 hours. PT/INR: No results for input(s): PROTIME, INR in the last 72 hours. APTT: No results for input(s): APTT in the last 72 hours. CARDIAC ENZYMES: No results for input(s): CKMB, CKMBINDEX, TROPONINI in the last 72 hours. Invalid input(s): CKTOTAL;3  FASTING LIPID PANEL:No results found for: CHOL, HDL, TRIG  LIVER PROFILE:   Recent Labs     02/28/23  1640   AST 59*   ALT 30   BILITOT 6.2*   ALKPHOS 83          Cultures  COVID/Influenza: not detected       Radiology  No orders to display         Assessment:  Principal Problem:    Sickle cell pain crisis ([facility] [address]  Resolved Problems:    * No resolved hospital problems. *      Plan:    #Sickle cell pain crisis  #Sickle cell anemia   -Recurrent admissions for sickle cell pain crisis   - complains of severe pain all over, increased left hip pain  -longstanding issue of med non-compliance   -Heme-onc consulted  -Monitor H/H . Transfuse as needed. He has required transfusions in previous admissions. Hemoglobin stable at 7.8 today  -Continue aggressive IV fluid hydration .  -On IV Dilaudid 1 mg every 3 hours as needed for pain , alternating with oxycodone 20 mg every 3 hours as needed for pain . Dose has been adjusted and frequency increased since admission. Continue same dosing of pain medications  - Supposed to be on chronic Hydrea for sickle cell anemia. Concern about compliance. Continue Hydrea 1000 mg twice daily. # Priapism. -Recurrent issue.   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 Patient not compliant with prescribed sildenafil  - Consulted urology. - Continued on sildenafil, and Sudafed as needed. -Urology has recommended outpatient follow-up for definitive surgical management  - he has had success in past with sudafed prn , alkalinization, ice packs and supplemental O2     #Pulmonary Embolism  -Recent diagnosis  Prior history of DVT  -   Started on Eliquis during last admission. Continue     #LFT elevation, chronic  --likely 2/2 sickle cell  -patient denies RUQ pain   -Monitor     #Mild leukocytosis  -Reactive secondary to sickle cell crisis  Monitor, improving. White count 14-> 11-> 9.8     #Asthma  -without acute exacerbation   -continue home inhalers     #GERD  -no longer taking PPI      #Vitamin D deficiency      #History of splenectomy     # Pt  has a management plan in place        Full Code   ADULT DIET; Regular    Continue current management.          Roshan Reyes MD   3/2/2023 11:54 AM

## 2023-03-02 NOTE — PROGRESS NOTES
Pt rates pain 8/10 to hip and groin. Alert and oriented. Ambulated to restroom and back. PRN pain medication given as ordered. Pt awake watching show on cell phone.  Pt declines offer for any further assistance or needs at this time

## 2023-03-02 NOTE — PROGRESS NOTES
BP (!) 107/59   Pulse 76   Temp 97.9 °F (36.6 °C) (Oral)   Resp 18   Ht 5' 9\" (1.753 m)   Wt 184 lb 14.4 oz (83.9 kg)   SpO2 95%   BMI 27.30 kg/m²     Assessment complete. Meds passed. Pt denies needs at this time    Patient complains of 8 out of 10 pain this morning in right hip. PRN dilaudid administered    Bedside Mobility Assessment Tool (BMAT):     Assessment Level 1- Sit and Shake    1. From a semi-reclined position, ask patient to sit up and rotate to a seated position at the side of the bed. Can use the bedrail. 2. Ask patient to reach out and grab your hand and shake making sure patient reaches across his/her midline. Pass- Patient is able to come to a seated position, maintain core strength. Maintains seated balance while reaching across midline. Move on to Assessment Level 2. Assessment Level 2- Stretch and Point   1. With patient in seated position at the side of the bed, have patient place both feet on the floor (or stool) with knees no higher than hips. 2. Ask patient to stretch one leg and straighten the knee, then bend the ankle/flex and point the toes. If appropriate, repeat with the other leg. Pass- Patient is able to demonstrate appropriate quad strength on intended weight bearing limb(s). Move onto Assessment Level 3. Assessment Level 3- Stand   1. Ask patient to elevate off the bed or chair (seated to standing) using an assistive device (cane, bedrail). 2. Patient should be able to raise buttocks off be and hold for a count of five. May repeat once. Pass- Patient maintains standing stability for at least 5 seconds, proceed to assessment level 4. Assessment Level 4- Walk   1. Ask patient to march in place at bedside. 2. Then ask patient to advance step and return each foot. Some medical conditions may render a patient from stepping backwards, use your best clinical judgement.    Pass- Patient demonstrates balance while shifting weight and ability to step, takes independent steps, does not use assistive device patient is MOBILITY LEVEL 4.       Mobility Level- 4

## 2023-03-02 NOTE — PROGRESS NOTES
IV infiltrated. Removed at this time. Restarted per Frank Anderson RN via ultrasound. Patient jailyn well. IVF's resumed. Call light in reach.

## 2023-03-02 NOTE — PROGRESS NOTES
ONCOLOGY HEMATOLOGY CARE PROGRESS NOTE      SUBJECTIVE:    Remains admitted. Afebrile. Still requiring IV pain medication. ROS:     Constitutional:  No weight loss, No fever, No chills, No night sweats. Energy level good.   Eyes:  No impairment or change in vision  ENT / Mouth:  No pain, abnormal ulceration, bleeding, nasal drip or change in voice or hearing  Cardiovascular:  No chest pain, palpitations, new edema, or calf discomfort  Respiratory:  No pain, hemoptysis, change to breathing  Breast:  No pain, discharge, change in appearance or texture  Gastrointestinal:  No pain, cramping, jaundice, change to eating and bowel habits  Urinary:  No pain, bleeding or change in continence  Genitalia: No pain, bleeding or discharge  Musculoskeletal:  No redness, pain, edema or weakness  Skin:  No pruritus, rash, change to nodules or lesions  Neurologic:  No discomfort, change in mental status, speech, sensory or motor activity  Psychiatric:  No change in concentration or change to affect or mood  Endocrine:  No hot flashes, increased thirst, or change to urine production  Hematologic: No petechiae, ecchymosis or bleeding  Lymphatic:  No lymphadenopathy or lymphedema  Allergy / Immunologic:  No eczema, hives, frequent or recurrent infections    OBJECTIVE        Physical    VITALS:  Patient Vitals for the past 24 hrs:   BP Temp Temp src Pulse Resp SpO2   03/02/23 0830 (!) 107/59 97.9 °F (36.6 °C) Oral 76 16 --   03/02/23 0218 -- -- -- -- (!) 6 --   03/02/23 0131 (!) 122/55 98.1 °F (36.7 °C) Oral 78 12 95 %   03/02/23 0030 -- -- -- -- 14 --   03/01/23 2115 (!) 100/55 98 °F (36.7 °C) Oral 82 16 94 %   03/01/23 1934 -- -- -- -- 16 --   03/01/23 1831 -- -- -- -- 16 --   03/01/23 1801 -- -- -- -- 16 --   03/01/23 1713 -- -- -- -- 16 --   03/01/23 1643 -- -- -- -- 16 --   03/01/23 1529 -- -- -- -- 16 --   03/01/23 1459 110/61 97.6 °F (36.4 °C) Oral 89 16 95 %   03/01/23 1404 -- -- -- -- 16 --   03/01/23 1242 -- -- -- -- 16 --   03/01/23 1212 -- -- -- -- 16 --   03/01/23 1107 -- -- -- -- 16 --   03/01/23 0919 (!) 112/59 97.2 °F (36.2 °C) Oral 71 16 93 %   03/01/23 0843 -- -- -- -- 16 --       24HR INTAKE/OUTPUT:    Intake/Output Summary (Last 24 hours) at 3/2/2023 0836  Last data filed at 3/2/2023 9091  Gross per 24 hour   Intake 6943.27 ml   Output 2400 ml   Net 4543.27 ml       CONSTITUTIONAL: awake, alert, cooperative, no apparent distress   EYES: pupils equal, round and reactive to light, sclera clear and conjunctiva normal  ENT: Normocephalic, without obvious abnormality, atraumatic  NECK: supple, symmetrical, no jugular venous distension and no carotid bruits   HEMATOLOGIC/LYMPHATIC: no cervical, supraclavicular or axillary lymphadenopathy   LUNGS: no increased work of breathing and clear to auscultation   CARDIOVASCULAR: regular rate and rhythm, normal S1 and S2, no murmur noted  ABDOMEN: normal bowel sounds x 4, soft, non-distended, non-tender, no masses palpated, no hepatosplenomgaly   MUSCULOSKELETAL: full range of motion noted, tone is normal  NEUROLOGIC: awake, alert, oriented to name, place and time. Motor skills grossly intact. SKIN: Normal skin color, texture, turgor and no jaundice.  appears intact   EXTREMITIES: no LE edema     DATA:  CBC:    Recent Labs     03/02/23  0525 03/01/23  0553 02/28/23  1640 02/25/23  0529   WBC 9.8 11.4* 14.6* 10.1   NEUTROABS 4.2 5.7 12.0* 5.1   LYMPHOPCT 38.6 34.7 9.0 37.0   RBC 2.15* 2.10* 2.46* 2.34*   HGB 7.8* 7.5* 8.7* 8.0*   HCT 22.5* 21.4* 24.5* 24.1*   .8* 101.7* 99.4 103.0*   MCH 36.5* 35.7* 35.3* 34.3*   MCHC 34.9 35.2 35.5 33.3   RDW 23.6* 24.2* 24.2* 21.9*    389 415 445       PT/INR:    Recent Labs     02/28/23  1640 02/25/23  0528 02/24/23  0533 02/23/23  0530 02/22/23  0813 02/18/23  0228 02/17/23  0305   PROT 8.0 6.5 5.8* 5.6* 6.8   < > 6.7   INR  --   --   --   --   --   --  1.25*    < > = values in this interval not displayed. PTT:    Recent Labs     02/17/23  0305   APTT 31.2       CMP:    Recent Labs     03/02/23  0525 03/01/23  0553 02/28/23  1640 02/25/23  0528 02/24/23  0533 02/23/23  0530    137 139 140 138 137   K 4.6 4.2 4.6 4.3 4.0 3.9    107 103 104 105 105   CO2 22 20* 25 27 24 24   GLUCOSE 122* 84 103* 91 92 99   BUN 7 12 9 7 7 7   CREATININE 0.6* 0.6* 0.6* 0.7* 0.7* 0.6*   LABGLOM >60 >60 >60 >60 >60 >60   CALCIUM 8.9 8.5 10.1 9.5 9.3 8.8   PROT  --   --  8.0 6.5 5.8* 5.6*   LABALBU  --   --  4.8 4.2 3.5 3.7   AGRATIO  --   --  1.5 1.8 1.5 1.9   BILITOT  --   --  6.2* 3.7* 3.6* 3.2*   ALKPHOS  --   --  83 70 68 60   ALT  --   --  30 31 33 34   AST  --   --  59* 33 40* 47*       Lab Results   Component Value Date    CALCIUM 8.9 03/02/2023    PHOS 4.5 10/20/2021       LDH:No results for input(s): LDH in the last 720 hours. Radiology Review:  XR CHEST PORTABLE  Narrative: EXAMINATION:  ONE XRAY VIEW OF THE CHEST    2/22/2023 8:14 am    COMPARISON:  02/17/2023 radiograph    HISTORY:  ORDERING SYSTEM PROVIDED HISTORY: Chest Discomfort  TECHNOLOGIST PROVIDED HISTORY:  Reason for exam:->Chest Discomfort  Reason for Exam: chest discomfort    FINDINGS:  The cardiac silhouette is prominent, likely magnified by portable technique. Mediastinum and pulmonary vascular markings are normal.  No acute airspace  abnormality. No significant skeletal finding. Impression: The lungs appear clear with no acute pulmonary finding.       Problem List  Patient Active Problem List   Diagnosis    Sickle cell pain crisis ([facility] [address]    Asthma    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 ([facility] [address]    Sepsis ([facility] [address]    Opiate overdose (HCC)    Opiate antagonist poisoning, accidental or unintentional, initial encounter    Leukocytosis    Hypoxia    Chronic anemia    Other chest pain    Pneumonia due to infectious organism    Fever    Chronic prescription opiate use    Right leg pain    Dental infection    Sickle cell crisis ([facility] [address]    History of DVT in adulthood    Overweight (BMI 25.0-29. 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   -IVF and pain meds  -has appt with priapism specialist later this month      ONCOLOGIC DISPOSITION: once pain improved      Carolee Carrillo MD  Please contact through [address]

## 2023-03-03 LAB
ALBUMIN SERPL-MCNC: 4.1 G/DL (ref 3.4–5)
ALP BLD-CCNC: 67 U/L (ref 40–129)
ALT SERPL-CCNC: 16 U/L (ref 10–40)
ANION GAP SERPL CALCULATED.3IONS-SCNC: 10 MMOL/L (ref 3–16)
AST SERPL-CCNC: 26 U/L (ref 15–37)
BASOPHILS ABSOLUTE: 0.1 K/UL (ref 0–0.2)
BASOPHILS RELATIVE PERCENT: 0.8 %
BILIRUB SERPL-MCNC: 3.9 MG/DL (ref 0–1)
BILIRUBIN DIRECT: 0.5 MG/DL (ref 0–0.3)
BILIRUBIN, INDIRECT: 3.4 MG/DL (ref 0–1)
BUN BLDV-MCNC: 6 MG/DL (ref 7–20)
CALCIUM SERPL-MCNC: 9.1 MG/DL (ref 8.3–10.6)
CHLORIDE BLD-SCNC: 105 MMOL/L (ref 99–110)
CO2: 24 MMOL/L (ref 21–32)
CREAT SERPL-MCNC: 0.6 MG/DL (ref 0.9–1.3)
EOSINOPHILS ABSOLUTE: 0.5 K/UL (ref 0–0.6)
EOSINOPHILS RELATIVE PERCENT: 4.1 %
GFR SERPL CREATININE-BSD FRML MDRD: >60 ML/MIN/{1.73_M2}
GLUCOSE BLD-MCNC: 105 MG/DL (ref 70–99)
HCT VFR BLD CALC: 22.1 % (ref 40.5–52.5)
HEMOGLOBIN: 7.9 G/DL (ref 13.5–17.5)
LYMPHOCYTES ABSOLUTE: 4.7 K/UL (ref 1–5.1)
LYMPHOCYTES RELATIVE PERCENT: 39.8 %
MCH RBC QN AUTO: 36.1 PG (ref 26–34)
MCHC RBC AUTO-ENTMCNC: 35.5 G/DL (ref 31–36)
MCV RBC AUTO: 101.7 FL (ref 80–100)
MONOCYTES ABSOLUTE: 1.1 K/UL (ref 0–1.3)
MONOCYTES RELATIVE PERCENT: 9.6 %
NEUTROPHILS ABSOLUTE: 5.4 K/UL (ref 1.7–7.7)
NEUTROPHILS RELATIVE PERCENT: 45.7 %
PDW BLD-RTO: 23.3 % (ref 12.4–15.4)
PLATELET # BLD: 458 K/UL (ref 135–450)
PMV BLD AUTO: 8.4 FL (ref 5–10.5)
POTASSIUM REFLEX MAGNESIUM: 4.1 MMOL/L (ref 3.5–5.1)
RBC # BLD: 2.18 M/UL (ref 4.2–5.9)
SODIUM BLD-SCNC: 139 MMOL/L (ref 136–145)
TOTAL PROTEIN: 6 G/DL (ref 6.4–8.2)
WBC # BLD: 11.7 K/UL (ref 4–11)

## 2023-03-03 PROCEDURE — 1200000000 HC SEMI PRIVATE

## 2023-03-03 PROCEDURE — 80048 BASIC METABOLIC PNL TOTAL CA: CPT

## 2023-03-03 PROCEDURE — 2580000003 HC RX 258: Performed by: INTERNAL MEDICINE

## 2023-03-03 PROCEDURE — 99232 SBSQ HOSP IP/OBS MODERATE 35: CPT | Performed by: INTERNAL MEDICINE

## 2023-03-03 PROCEDURE — 80076 HEPATIC FUNCTION PANEL: CPT

## 2023-03-03 PROCEDURE — 6360000002 HC RX W HCPCS: Performed by: INTERNAL MEDICINE

## 2023-03-03 PROCEDURE — 36415 COLL VENOUS BLD VENIPUNCTURE: CPT

## 2023-03-03 PROCEDURE — 85025 COMPLETE CBC W/AUTO DIFF WBC: CPT

## 2023-03-03 PROCEDURE — 6370000000 HC RX 637 (ALT 250 FOR IP): Performed by: INTERNAL MEDICINE

## 2023-03-03 RX ADMIN — FOLIC ACID 2 MG: 1 TABLET ORAL at 08:59

## 2023-03-03 RX ADMIN — HYDROMORPHONE HYDROCHLORIDE 1 MG: 1 INJECTION, SOLUTION INTRAMUSCULAR; INTRAVENOUS; SUBCUTANEOUS at 23:04

## 2023-03-03 RX ADMIN — OXYCODONE HYDROCHLORIDE 20 MG: 15 TABLET ORAL at 21:48

## 2023-03-03 RX ADMIN — HYDROMORPHONE HYDROCHLORIDE 1 MG: 1 INJECTION, SOLUTION INTRAMUSCULAR; INTRAVENOUS; SUBCUTANEOUS at 02:58

## 2023-03-03 RX ADMIN — HYDROMORPHONE HYDROCHLORIDE 1 MG: 1 INJECTION, SOLUTION INTRAMUSCULAR; INTRAVENOUS; SUBCUTANEOUS at 09:26

## 2023-03-03 RX ADMIN — HYDROXYUREA 1000 MG: 500 CAPSULE ORAL at 09:00

## 2023-03-03 RX ADMIN — SILDENAFIL 20 MG: 20 TABLET, FILM COATED ORAL at 08:59

## 2023-03-03 RX ADMIN — Medication 10 ML: at 06:22

## 2023-03-03 RX ADMIN — HYDROMORPHONE HYDROCHLORIDE 1 MG: 1 INJECTION, SOLUTION INTRAMUSCULAR; INTRAVENOUS; SUBCUTANEOUS at 06:22

## 2023-03-03 RX ADMIN — APIXABAN 5 MG: 5 TABLET, FILM COATED ORAL at 19:47

## 2023-03-03 RX ADMIN — OXYCODONE HYDROCHLORIDE 20 MG: 15 TABLET ORAL at 01:22

## 2023-03-03 RX ADMIN — SODIUM CHLORIDE: 9 INJECTION, SOLUTION INTRAVENOUS at 06:23

## 2023-03-03 RX ADMIN — OXYCODONE HYDROCHLORIDE 20 MG: 15 TABLET ORAL at 14:16

## 2023-03-03 RX ADMIN — HYDROMORPHONE HYDROCHLORIDE 1 MG: 1 INJECTION, SOLUTION INTRAMUSCULAR; INTRAVENOUS; SUBCUTANEOUS at 19:47

## 2023-03-03 RX ADMIN — SODIUM CHLORIDE, PRESERVATIVE FREE 10 ML: 5 INJECTION INTRAVENOUS at 19:48

## 2023-03-03 RX ADMIN — OXYCODONE HYDROCHLORIDE 20 MG: 15 TABLET ORAL at 17:33

## 2023-03-03 RX ADMIN — SODIUM CHLORIDE: 9 INJECTION, SOLUTION INTRAVENOUS at 15:52

## 2023-03-03 RX ADMIN — OXYCODONE HYDROCHLORIDE 20 MG: 15 TABLET ORAL at 04:26

## 2023-03-03 RX ADMIN — SODIUM CHLORIDE: 9 INJECTION, SOLUTION INTRAVENOUS at 23:04

## 2023-03-03 RX ADMIN — APIXABAN 5 MG: 5 TABLET, FILM COATED ORAL at 08:59

## 2023-03-03 RX ADMIN — HYDROMORPHONE HYDROCHLORIDE 1 MG: 1 INJECTION, SOLUTION INTRAMUSCULAR; INTRAVENOUS; SUBCUTANEOUS at 16:26

## 2023-03-03 RX ADMIN — Medication 10 ML: at 23:04

## 2023-03-03 RX ADMIN — HYDROMORPHONE HYDROCHLORIDE 1 MG: 1 INJECTION, SOLUTION INTRAMUSCULAR; INTRAVENOUS; SUBCUTANEOUS at 13:19

## 2023-03-03 RX ADMIN — HYDROXYUREA 1000 MG: 500 CAPSULE ORAL at 19:47

## 2023-03-03 ASSESSMENT — PAIN DESCRIPTION - ORIENTATION
ORIENTATION: RIGHT

## 2023-03-03 ASSESSMENT — PAIN SCALES - GENERAL
PAINLEVEL_OUTOF10: 9
PAINLEVEL_OUTOF10: 8

## 2023-03-03 ASSESSMENT — PAIN DESCRIPTION - ONSET
ONSET: ON-GOING

## 2023-03-03 ASSESSMENT — PAIN DESCRIPTION - FREQUENCY
FREQUENCY: CONTINUOUS

## 2023-03-03 ASSESSMENT — PAIN - FUNCTIONAL ASSESSMENT
PAIN_FUNCTIONAL_ASSESSMENT: ACTIVITIES ARE NOT PREVENTED

## 2023-03-03 ASSESSMENT — PAIN DESCRIPTION - DESCRIPTORS
DESCRIPTORS: ACHING;DISCOMFORT
DESCRIPTORS: ACHING
DESCRIPTORS: ACHING;DISCOMFORT
DESCRIPTORS: ACHING
DESCRIPTORS: ACHING;DISCOMFORT
DESCRIPTORS: ACHING
DESCRIPTORS: ACHING
DESCRIPTORS: ACHING;DISCOMFORT

## 2023-03-03 ASSESSMENT — PAIN DESCRIPTION - LOCATION
LOCATION: HIP

## 2023-03-03 ASSESSMENT — PAIN DESCRIPTION - PAIN TYPE
TYPE: ACUTE PAIN;CHRONIC PAIN

## 2023-03-03 NOTE — PROGRESS NOTES
ONCOLOGY HEMATOLOGY CARE PROGRESS NOTE      SUBJECTIVE:    Remains admitted. Afebrile. Thinks he might be ready to go home. ROS:     Constitutional:  No weight loss, No fever, No chills, No night sweats. Energy level good.   Eyes:  No impairment or change in vision  ENT / Mouth:  No pain, abnormal ulceration, bleeding, nasal drip or change in voice or hearing  Cardiovascular:  No chest pain, palpitations, new edema, or calf discomfort  Respiratory:  No pain, hemoptysis, change to breathing  Breast:  No pain, discharge, change in appearance or texture  Gastrointestinal:  No pain, cramping, jaundice, change to eating and bowel habits  Urinary:  No pain, bleeding or change in continence  Genitalia: No pain, bleeding or discharge  Musculoskeletal:  No redness, pain, edema or weakness  Skin:  No pruritus, rash, change to nodules or lesions  Neurologic:  No discomfort, change in mental status, speech, sensory or motor activity  Psychiatric:  No change in concentration or change to affect or mood  Endocrine:  No hot flashes, increased thirst, or change to urine production  Hematologic: No petechiae, ecchymosis or bleeding  Lymphatic:  No lymphadenopathy or lymphedema  Allergy / Immunologic:  No eczema, hives, frequent or recurrent infections    OBJECTIVE        Physical    VITALS:  Patient Vitals for the past 24 hrs:   BP Temp Temp src Pulse Resp SpO2   03/03/23 0456 -- -- -- -- 16 --   03/03/23 0328 -- -- -- -- 16 --   03/03/23 0303 118/61 98.7 °F (37.1 °C) Oral 83 16 94 %   03/03/23 0152 -- -- -- -- 16 --   03/03/23 0026 -- -- -- -- 16 --   03/02/23 2305 -- -- -- -- 16 --   03/02/23 2130 (!) 106/52 98.5 °F (36.9 °C) Oral 77 16 95 %   03/02/23 2118 -- -- -- -- 16 --   03/02/23 1957 -- -- -- -- 16 --   03/02/23 1927 -- -- -- -- 18 --   03/02/23 1859 (!) 107/50 98.7 °F (37.1 °C) Oral 76 16 94 %   03/02/23 1817 -- -- -- -- 18 --   03/02/23 1747 -- -- -- -- 18 --   03/02/23 1646 -- -- -- -- 18 --   03/02/23 1616 -- -- -- -- 18 --   03/02/23 1515 -- -- -- -- 18 --   03/02/23 1445 -- -- -- -- 18 --   03/02/23 1342 -- -- -- -- 18 --   03/02/23 1300 (!) 105/54 97.7 °F (36.5 °C) Oral 83 18 94 %   03/02/23 1210 -- -- -- -- 18 --   03/02/23 1140 -- -- -- -- 18 --   03/02/23 1036 -- -- -- -- 18 --   03/02/23 1006 -- -- -- -- 18 --   03/02/23 0907 -- -- -- -- 18 --         24HR INTAKE/OUTPUT:    Intake/Output Summary (Last 24 hours) at 3/3/2023 0832  Last data filed at 3/3/2023 9817  Gross per 24 hour   Intake 7532 ml   Output 2175 ml   Net 5357 ml         CONSTITUTIONAL: awake, alert, cooperative, no apparent distress   EYES: pupils equal, round and reactive to light, sclera clear and conjunctiva normal  ENT: Normocephalic, without obvious abnormality, atraumatic  NECK: supple, symmetrical, no jugular venous distension and no carotid bruits   HEMATOLOGIC/LYMPHATIC: no cervical, supraclavicular or axillary lymphadenopathy   LUNGS: no increased work of breathing and clear to auscultation   CARDIOVASCULAR: regular rate and rhythm, normal S1 and S2, no murmur noted  ABDOMEN: normal bowel sounds x 4, soft, non-distended, non-tender, no masses palpated, no hepatosplenomgaly   MUSCULOSKELETAL: full range of motion noted, tone is normal  NEUROLOGIC: awake, alert, oriented to name, place and time. Motor skills grossly intact. SKIN: Normal skin color, texture, turgor and no jaundice.  appears intact   EXTREMITIES: no LE edema     DATA:  CBC:    Recent Labs     03/03/23  0620 03/02/23  0525 03/01/23  0553 02/28/23  1640   WBC 11.7* 9.8 11.4* 14.6*   NEUTROABS 5.4 4.2 5.7 12.0*   LYMPHOPCT 39.8 38.6 34.7 9.0   RBC 2.18* 2.15* 2.10* 2.46*   HGB 7.9* 7.8* 7.5* 8.7*   HCT 22.1* 22.5* 21.4* 24.5*   .7* 104.8* 101.7* 99.4   MCH 36.1* 36.5* 35.7* 35.3*   MCHC 35.5 34.9 35.2 35.5   RDW 23.3* 23.6* 24.2* 24.2*   * 385 389 415         PT/INR:    Recent Labs     03/03/23  0620 02/28/23  1640 02/25/23  0528 02/24/23  0533 02/23/23  0530 02/18/23  0228 02/17/23  0305   PROT 6.0* 8.0 6.5 5.8* 5.6*   < > 6.7   INR  --   --   --   --   --   --  1.25*    < > = values in this interval not displayed. PTT:    Recent Labs     02/17/23  0305   APTT 31.2         CMP:    Recent Labs     03/03/23  0620 03/02/23  0525 03/01/23  0553 02/28/23  1640 02/25/23  0528 02/24/23  0533 02/23/23  0530    136 137 139 140 138 137   K 4.1 4.6 4.2 4.6 4.3 4.0 3.9    105 107 103 104 105 105   CO2 24 22 20* 25 27 24 24   GLUCOSE 105* 122* 84 103* 91 92 99   BUN 6* 7 12 9 7 7 7   CREATININE 0.6* 0.6* 0.6* 0.6* 0.7* 0.7* 0.6*   LABGLOM >60 >60 >60 >60 >60 >60 >60   CALCIUM 9.1 8.9 8.5 10.1 9.5 9.3 8.8   PROT 6.0*  --   --  8.0 6.5 5.8* 5.6*   LABALBU 4.1  --   --  4.8 4.2 3.5 3.7   AGRATIO  --   --   --  1.5 1.8 1.5 1.9   BILITOT 3.9*  --   --  6.2* 3.7* 3.6* 3.2*   ALKPHOS 67  --   --  83 70 68 60   ALT 16  --   --  30 31 33 34   AST 26  --   --  59* 33 40* 47*         Lab Results   Component Value Date    CALCIUM 9.1 03/03/2023    PHOS 4.5 10/20/2021       LDH:No results for input(s): LDH in the last 720 hours. Radiology Review:  XR CHEST PORTABLE  Narrative: EXAMINATION:  ONE XRAY VIEW OF THE CHEST    2/22/2023 8:14 am    COMPARISON:  02/17/2023 radiograph    HISTORY:  ORDERING SYSTEM PROVIDED HISTORY: Chest Discomfort  TECHNOLOGIST PROVIDED HISTORY:  Reason for exam:->Chest Discomfort  Reason for Exam: chest discomfort    FINDINGS:  The cardiac silhouette is prominent, likely magnified by portable technique. Mediastinum and pulmonary vascular markings are normal.  No acute airspace  abnormality. No significant skeletal finding. Impression: The lungs appear clear with no acute pulmonary finding.       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   -IVF and pain meds  -has appt with priapism specialist later this month      ONCOLOGIC DISPOSITION: once pain improved, otherwise no oncology barriers to discharge      Ethan Diaz MD  Please contact through [address]

## 2023-03-03 NOTE — PROGRESS NOTES
BP (!) 101/57   Pulse 65   Temp 98.1 °F (36.7 °C) (Oral)   Resp 18   Ht 5' 9\" (1.753 m)   Wt 184 lb 14.4 oz (83.9 kg)   SpO2 95%   BMI 27.30 kg/m²     Assessment complete. Meds passed. Pt denies needs at this time    Patient rates pain 8 out of 10. Dilaudid available at 0925. Bedside Mobility Assessment Tool (BMAT):     Assessment Level 1- Sit and Shake    1. From a semi-reclined position, ask patient to sit up and rotate to a seated position at the side of the bed. Can use the bedrail. 2. Ask patient to reach out and grab your hand and shake making sure patient reaches across his/her midline. Pass- Patient is able to come to a seated position, maintain core strength. Maintains seated balance while reaching across midline. Move on to Assessment Level 2. Assessment Level 2- Stretch and Point   1. With patient in seated position at the side of the bed, have patient place both feet on the floor (or stool) with knees no higher than hips. 2. Ask patient to stretch one leg and straighten the knee, then bend the ankle/flex and point the toes. If appropriate, repeat with the other leg. Pass- Patient is able to demonstrate appropriate quad strength on intended weight bearing limb(s). Move onto Assessment Level 3. Assessment Level 3- Stand   1. Ask patient to elevate off the bed or chair (seated to standing) using an assistive device (cane, bedrail). 2. Patient should be able to raise buttocks off be and hold for a count of five. May repeat once. Pass- Patient maintains standing stability for at least 5 seconds, proceed to assessment level 4. Assessment Level 4- Walk   1. Ask patient to march in place at bedside. 2. Then ask patient to advance step and return each foot. Some medical conditions may render a patient from stepping backwards, use your best clinical judgement.    Pass- Patient demonstrates balance while shifting weight and ability to step, takes independent steps, does not use assistive device patient is MOBILITY LEVEL 4.       Mobility Level- 4

## 2023-03-03 NOTE — PLAN OF CARE
Problem: Pain  Goal: Verbalizes/displays adequate comfort level or baseline comfort level  3/2/2023 2000 by Geneva Jimenez RN  Outcome: Progressing  3/2/2023 1113 by Leida Turcios RN  Outcome: Progressing     Problem: Safety - Adult  Goal: Free from fall injury  3/2/2023 2000 by Geneva Jimenez RN  Outcome: Progressing  3/2/2023 1113 by Leida Turcios RN  Outcome: Progressing     Problem: ABCDS Injury Assessment  Goal: Absence of physical injury  3/2/2023 2000 by Geneva Jimenez RN  Outcome: Progressing  3/2/2023 1113 by Leida Turcios RN  Outcome: Progressing

## 2023-03-03 NOTE — PROGRESS NOTES
Urology Progress Note      Subjective: Michael Grijalva reports feeling better. He denies painful erection currently     Vitals:  BP (!) 101/57   Pulse 65   Temp 98.1 °F (36.7 °C) (Oral)   Resp 16   Ht 5' 9\" (1.753 m)   Wt 184 lb 14.4 oz (83.9 kg)   SpO2 95%   BMI 27.30 kg/m²   Temp  Av.3 °F (36.8 °C)  Min: 97.7 °F (36.5 °C)  Max: 98.7 °F (37.1 °C)    Intake/Output Summary (Last 24 hours) at 3/3/2023 1014  Last data filed at 3/3/2023 0912  Gross per 24 hour   Intake 7772 ml   Output 1800 ml   Net 5972 ml       Exam:   Physical:   Well developed, well nourished in no acute distress  Mood indicates no abnormalities. Pt doesnt appear depressed  Orientated to time and place      Labs:  WBC:    Lab Results   Component Value Date/Time    WBC 11.7 2023 06:20 AM     Hemoglobin/Hematocrit:    Lab Results   Component Value Date/Time    HGB 7.9 2023 06:20 AM    HCT 22.1 2023 06:20 AM     BMP:    Lab Results   Component Value Date/Time     2023 06:20 AM    K 4.1 2023 06:20 AM     2023 06:20 AM    CO2 24 2023 06:20 AM    BUN 6 2023 06:20 AM    LABALBU 4.1 2023 06:20 AM    CREATININE 0.6 2023 06:20 AM    CALCIUM 9.1 2023 06:20 AM    GFRAA >60 2022 02:12 AM    LABGLOM >60 2023 06:20 AM     PT/INR:    Lab Results   Component Value Date/Time    PROTIME 15.5 2023 03:05 AM    INR 1.25 2023 03:05 AM     PTT:    Lab Results   Component Value Date/Time    APTT 31.2 2023 03:05 AM   [APTT      Impression/Plan:     Sickle cell anemia with history of recurrent priapism  - no acute urologic issues. We will sign off, please call with any questions or concerns. He should follow up in office after discharge.      Robert Chris PA-C  [facility]

## 2023-03-03 NOTE — FLOWSHEET NOTE
03/02/23 2130   Vital Signs   Temp 98.5 °F (36.9 °C)   Temp Source Oral   Heart Rate 77   Heart Rate Source Monitor   Resp 16   BP (!) 106/52   MAP (Calculated) 70   BP Location Right upper arm   Patient Position Semi fowlers   Level of Consciousness 0   MEWS Score 1   Oxygen Therapy   SpO2 95 %   O2 Device None (Room air)   Assessment complete- see flowsheets. Pt resting in bed, aware to call for any needs or changes and denies further needs at this time. Call light within reach. Will continue to monitor.   Ciarra Mcduffie RN

## 2023-03-03 NOTE — CARE COORDINATION
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 Interdisciplinary team contacted or reviewed plan related to patient progress and discharge plans. Disciplines included Case Management, Nursing, and Dietitian. Current Status:ongoing   PT/OT recommendation for discharge plan of care: n/a    Expected D/C Disposition:  Home  Confirmed plan with patient and/or family Yes confirmed with: (name) pt  Met with:pt    Discharge Plan Comments: Chart review completed. Met with pt at bedside. Pt stated he will return home when discharged. Discussed skilled home care for RN/PT/OT and stated he won't need this. He denied all needs from CM.      Home O2 in place on admit: No

## 2023-03-03 NOTE — PLAN OF CARE
Problem: Discharge Planning  Goal: Discharge to home or other facility with appropriate resources  3/2/2023 2000 by Deep Delong RN  Outcome: Progressing     Problem: Pain  Goal: Verbalizes/displays adequate comfort level or baseline comfort level  3/3/2023 0953 by Pasquale Starr RN  Outcome: Progressing  3/2/2023 2000 by Deep Delong RN  Outcome: Progressing     Problem: Safety - Adult  Goal: Free from fall injury  3/3/2023 0953 by Pasquale Starr RN  Outcome: Progressing  3/2/2023 2000 by Deep Delong RN  Outcome: Progressing     Problem: ABCDS Injury Assessment  Goal: Absence of physical injury  3/3/2023 0953 by Pasquale Starr RN  Outcome: Progressing  3/2/2023 2000 by Deep Delong RN  Outcome: Progressing

## 2023-03-03 NOTE — PROGRESS NOTES
IM Progress Note    Admit Date:  2/28/2023    Admitted with sickle cell pain crisis and priapism    Subjective:  Mr. Anjali Brantley looks a little better today, not in any distress . Patient is awake today ; pain is controlled he feels better    Objective:   /63   Pulse 70   Temp 97.8 °F (36.6 °C) (Oral)   Resp 16   Ht 5' 9\" (1.753 m)   Wt 184 lb 14.4 oz (83.9 kg)   SpO2 96%   BMI 27.30 kg/m²     Intake/Output Summary (Last 24 hours) at 3/3/2023 1430  Last data filed at 3/3/2023 0912  Gross per 24 hour   Intake 7772 ml   Output 1800 ml   Net 5972 ml           Physical Exam:  Gen: 23-year-old -American male , in no distress. alert and well-oriented  Eyes: PERRL. No sclera icterus. No conjunctival injection. ENT: No discharge. Pharynx clear. Neck: Trachea midline. Normal thyroid. Resp: No accessory muscle use. No crackles. No wheezes. No rhonchi. No dullness on percussion. CV: Regular rate. Regular rhythm. No murmur or rub. No edema. GI: Non-tender. Non-distended. No masses. No organomegaly. Normal bowel sounds. No hernia.  : Deferred  Skin: Warm and dry. No nodule on exposed extremities. No rash on exposed extremities. Lymph: No cervical LAD. No supraclavicular LAD. M/S: No cyanosis. No joint deformity. No clubbing. Intact peripheral pulses. Brisk cap refill, < 2 secs  Neuro: Awake. Reflexes 2+ symmetric bilaterally   Psych: Oriented x 3. No anxiety or agitation.        Medications:   Scheduled Meds:   lidocaine PF  2 mL IntraDERmal Once    phenylephrine  200 mcg IntraCAVernosal Once    phenylephrine  200 mcg IntraCAVernosal Once    hydroxyurea  1,000 mg Oral BID    folic acid  2 mg Oral Daily    apixaban  5 mg Oral BID    sodium chloride flush  5-40 mL IntraVENous 2 times per day    sildenafil  20 mg Oral Daily       Continuous Infusions:   sodium chloride 125 mL/hr at 03/03/23 0623    sodium chloride         Data:  CBC:   Recent Labs     03/01/23  0553 03/02/23  0595 03/03/23  0620   WBC 11.4* 9.8 11.7*   RBC 2.10* 2.15* 2.18*   HGB 7.5* 7.8* 7.9*   HCT 21.4* 22.5* 22.1*   .7* 104.8* 101.7*   RDW 24.2* 23.6* 23.3*    385 458*       BMP:   Recent Labs     03/01/23  0553 03/02/23  0525 03/03/23  0620    136 139   K 4.2 4.6 4.1    105 105   CO2 20* 22 24   BUN 12 7 6*   CREATININE 0.6* 0.6* 0.6*       BNP: No results for input(s): BNP in the last 72 hours. PT/INR: No results for input(s): PROTIME, INR in the last 72 hours. APTT: No results for input(s): APTT in the last 72 hours. CARDIAC ENZYMES: No results for input(s): CKMB, CKMBINDEX, TROPONINI in the last 72 hours. Invalid input(s): CKTOTAL;3  FASTING LIPID PANEL:No results found for: CHOL, HDL, TRIG  LIVER PROFILE:   Recent Labs     02/28/23  1640 03/03/23  0620   AST 59* 26   ALT 30 16   BILIDIR  --  0.5*   BILITOT 6.2* 3.9*   ALKPHOS 83 67            Cultures  COVID/Influenza: not detected       Radiology  No orders to display         Assessment:  Principal Problem:    Sickle cell pain crisis ([facility] [address]  Resolved Problems:    * No resolved hospital problems. *      Plan:    #Sickle cell pain crisis  #Sickle cell anemia   -Recurrent admissions for sickle cell pain crisis   - complains of severe pain all over, increased left hip pain  -longstanding issue of med non-compliance   -Heme-onc consulted  -Monitor H/H . Transfuse as needed. He has required transfusions in previous admissions. Hemoglobin stable today  -Continue aggressive IV fluid hydration .  -On IV Dilaudid 1 mg every 3 hours as needed for pain , alternating with oxycodone 20 mg every 3 hours as needed for pain . Dose has been adjusted and frequency increased since admission. Continue same dosing of pain medications  - Supposed to be on chronic Hydrea for sickle cell anemia. Concern about compliance. Continue Hydrea 1000 mg twice daily. Pain is improved today. Likely discharge plan for tomorrow     # Priapism.     -Recurrent issue.  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 Patient not compliant with prescribed sildenafil  - Consulted urology. - Continued on sildenafil, and Sudafed as needed. -Urology has recommended outpatient follow-up for definitive surgical management  - he has had success in past with sudafed prn , alkalinization, ice packs and supplemental O2     #Pulmonary Embolism  -Recent diagnosis  Prior history of DVT  -   Started on Eliquis during last admission. Continue     #LFT elevation, chronic  --likely 2/2 sickle cell  -patient denies RUQ pain   -Monitor     #Mild leukocytosis  -Reactive secondary to sickle cell crisis  Monitor, improving. White count 14-> 11-> 9.8     #Asthma  -without acute exacerbation   -continue home inhalers     #GERD  -no longer taking PPI      #Vitamin D deficiency      #History of splenectomy     # Pt  has a management plan in place        Full Code   ADULT DIET; Regular    Continue current management. Patient is doing well, improving. Ready for discharge tomorrow.    Patient informed that he will not be getting any pain prescriptions on discharge    Edraphael Schuler MD   3/3/2023 2:30 PM

## 2023-03-03 NOTE — PROGRESS NOTES
Urology Progress Note    Subjective: I feel  ok. HPI: Patient has been admitted for sickle cell crisis. P/E  /61   Pulse 83   Temp 98.7 °F (37.1 °C) (Oral)   Resp 16   Ht 5' 9\" (1.753 m)   Wt 184 lb 14.4 oz (83.9 kg)   SpO2 94%   BMI 27.30 kg/m²   Skin: Intact  Respiratory: Breathing without difficulty, stable. GI: No acute changes, stable po intake. : Stable, flaccid, no priapism. MSK: No acute changes, stable. Neuro: No acute changes, stable. Labs  Lab Results   Component Value Date/Time    WBC 9.8 03/02/2023 05:25 AM    HGB 7.8 03/02/2023 05:25 AM    HCT 22.5 03/02/2023 05:25 AM    .8 03/02/2023 05:25 AM     03/02/2023 05:25 AM     Lab Results   Component Value Date/Time     03/02/2023 05:25 AM    K 4.6 03/02/2023 05:25 AM     03/02/2023 05:25 AM    CO2 22 03/02/2023 05:25 AM    BUN 7 03/02/2023 05:25 AM    CREATININE 0.6 03/02/2023 05:25 AM    CALCIUM 8.9 03/02/2023 05:25 AM       Assessment/Plan  Stable from  standpoint.     Electronically signed by Nick Mendoza MD on 3/3/2023 at 6:18 AM

## 2023-03-04 PROCEDURE — 6370000000 HC RX 637 (ALT 250 FOR IP): Performed by: INTERNAL MEDICINE

## 2023-03-04 PROCEDURE — 6360000002 HC RX W HCPCS: Performed by: INTERNAL MEDICINE

## 2023-03-04 PROCEDURE — 2580000003 HC RX 258: Performed by: INTERNAL MEDICINE

## 2023-03-04 PROCEDURE — 1200000000 HC SEMI PRIVATE

## 2023-03-04 PROCEDURE — 99232 SBSQ HOSP IP/OBS MODERATE 35: CPT | Performed by: INTERNAL MEDICINE

## 2023-03-04 RX ORDER — LEVOFLOXACIN 500 MG/1
500 TABLET, FILM COATED ORAL DAILY
Status: DISCONTINUED | OUTPATIENT
Start: 2023-03-04 | End: 2023-03-05 | Stop reason: HOSPADM

## 2023-03-04 RX ADMIN — HYDROMORPHONE HYDROCHLORIDE 1 MG: 1 INJECTION, SOLUTION INTRAMUSCULAR; INTRAVENOUS; SUBCUTANEOUS at 08:37

## 2023-03-04 RX ADMIN — OXYCODONE HYDROCHLORIDE 20 MG: 15 TABLET ORAL at 09:56

## 2023-03-04 RX ADMIN — HYDROMORPHONE HYDROCHLORIDE 1 MG: 1 INJECTION, SOLUTION INTRAMUSCULAR; INTRAVENOUS; SUBCUTANEOUS at 02:10

## 2023-03-04 RX ADMIN — SODIUM CHLORIDE: 9 INJECTION, SOLUTION INTRAVENOUS at 05:35

## 2023-03-04 RX ADMIN — SODIUM CHLORIDE: 9 INJECTION, SOLUTION INTRAVENOUS at 20:42

## 2023-03-04 RX ADMIN — HYDROMORPHONE HYDROCHLORIDE 1 MG: 1 INJECTION, SOLUTION INTRAMUSCULAR; INTRAVENOUS; SUBCUTANEOUS at 12:41

## 2023-03-04 RX ADMIN — SILDENAFIL 20 MG: 20 TABLET, FILM COATED ORAL at 08:41

## 2023-03-04 RX ADMIN — HYDROXYUREA 1000 MG: 500 CAPSULE ORAL at 08:41

## 2023-03-04 RX ADMIN — FOLIC ACID 2 MG: 1 TABLET ORAL at 08:41

## 2023-03-04 RX ADMIN — OXYCODONE HYDROCHLORIDE 20 MG: 15 TABLET ORAL at 17:56

## 2023-03-04 RX ADMIN — LEVOFLOXACIN 500 MG: 500 TABLET, FILM COATED ORAL at 09:56

## 2023-03-04 RX ADMIN — HYDROMORPHONE HYDROCHLORIDE 1 MG: 1 INJECTION, SOLUTION INTRAMUSCULAR; INTRAVENOUS; SUBCUTANEOUS at 20:42

## 2023-03-04 RX ADMIN — OXYCODONE HYDROCHLORIDE 20 MG: 15 TABLET ORAL at 00:44

## 2023-03-04 RX ADMIN — APIXABAN 5 MG: 5 TABLET, FILM COATED ORAL at 08:41

## 2023-03-04 RX ADMIN — HYDROMORPHONE HYDROCHLORIDE 1 MG: 1 INJECTION, SOLUTION INTRAMUSCULAR; INTRAVENOUS; SUBCUTANEOUS at 16:43

## 2023-03-04 RX ADMIN — PSEUDOEPHEDRINE HCL 60 MG: 30 TABLET, FILM COATED ORAL at 01:03

## 2023-03-04 RX ADMIN — APIXABAN 5 MG: 5 TABLET, FILM COATED ORAL at 20:39

## 2023-03-04 RX ADMIN — OXYCODONE HYDROCHLORIDE 20 MG: 15 TABLET ORAL at 14:32

## 2023-03-04 RX ADMIN — OXYCODONE HYDROCHLORIDE 20 MG: 15 TABLET ORAL at 03:51

## 2023-03-04 RX ADMIN — SODIUM CHLORIDE, PRESERVATIVE FREE 10 ML: 5 INJECTION INTRAVENOUS at 20:40

## 2023-03-04 RX ADMIN — HYDROMORPHONE HYDROCHLORIDE 1 MG: 1 INJECTION, SOLUTION INTRAMUSCULAR; INTRAVENOUS; SUBCUTANEOUS at 05:32

## 2023-03-04 RX ADMIN — OXYCODONE HYDROCHLORIDE 20 MG: 15 TABLET ORAL at 22:06

## 2023-03-04 RX ADMIN — HYDROXYUREA 1000 MG: 500 CAPSULE ORAL at 20:39

## 2023-03-04 RX ADMIN — SODIUM CHLORIDE: 9 INJECTION, SOLUTION INTRAVENOUS at 14:35

## 2023-03-04 RX ADMIN — OXYCODONE HYDROCHLORIDE 20 MG: 15 TABLET ORAL at 06:54

## 2023-03-04 ASSESSMENT — PAIN DESCRIPTION - LOCATION
LOCATION: HIP

## 2023-03-04 ASSESSMENT — PAIN DESCRIPTION - DESCRIPTORS
DESCRIPTORS: ACHING

## 2023-03-04 ASSESSMENT — PAIN SCALES - GENERAL
PAINLEVEL_OUTOF10: 8
PAINLEVEL_OUTOF10: 9
PAINLEVEL_OUTOF10: 8
PAINLEVEL_OUTOF10: 8
PAINLEVEL_OUTOF10: 9
PAINLEVEL_OUTOF10: 8

## 2023-03-04 ASSESSMENT — PAIN DESCRIPTION - ONSET
ONSET: ON-GOING

## 2023-03-04 ASSESSMENT — PAIN - FUNCTIONAL ASSESSMENT
PAIN_FUNCTIONAL_ASSESSMENT: ACTIVITIES ARE NOT PREVENTED

## 2023-03-04 ASSESSMENT — PAIN DESCRIPTION - PAIN TYPE
TYPE: ACUTE PAIN;CHRONIC PAIN

## 2023-03-04 ASSESSMENT — PAIN DESCRIPTION - FREQUENCY
FREQUENCY: CONTINUOUS

## 2023-03-04 ASSESSMENT — PAIN DESCRIPTION - ORIENTATION
ORIENTATION: RIGHT

## 2023-03-04 NOTE — PLAN OF CARE
Problem: Pain  Goal: Verbalizes/displays adequate comfort level or baseline comfort level  3/3/2023 1954 by Michelle Walsh RN  Outcome: Progressing  3/3/2023 0953 by Winter Gee RN  Outcome: Progressing     Problem: Safety - Adult  Goal: Free from fall injury  3/3/2023 1954 by Michelle Walsh RN  Outcome: Progressing  3/3/2023 0953 by Winter Gee RN  Outcome: Progressing     Problem: ABCDS Injury Assessment  Goal: Absence of physical injury  3/3/2023 1954 by Michelle Walsh RN  Outcome: Progressing  3/3/2023 0953 by Winter Gee RN  Outcome: Progressing

## 2023-03-04 NOTE — PROGRESS NOTES
ONCOLOGY HEMATOLOGY CARE PROGRESS NOTE      SUBJECTIVE:    Remains admitted. Afebrile. Pain has gradually improved. D/W Dr. Nohemy Valero, his hematologist, and he will be due for pain medication refill on Monday. Reports some L face swelling and sinus pressure. No fever. ROS:     Constitutional:  No weight loss, No fever, No chills, No night sweats. Energy level good.   Eyes:  No impairment or change in vision  ENT / Mouth:  No pain, abnormal ulceration, bleeding, nasal drip or change in voice or hearing  Cardiovascular:  No chest pain, palpitations, new edema, or calf discomfort  Respiratory:  No pain, hemoptysis, change to breathing  Breast:  No pain, discharge, change in appearance or texture  Gastrointestinal:  No pain, cramping, jaundice, change to eating and bowel habits  Urinary:  No pain, bleeding or change in continence  Genitalia: No pain, bleeding or discharge  Musculoskeletal:  No redness, pain, edema or weakness  Skin:  No pruritus, rash, change to nodules or lesions  Neurologic:  No discomfort, change in mental status, speech, sensory or motor activity  Psychiatric:  No change in concentration or change to affect or mood  Endocrine:  No hot flashes, increased thirst, or change to urine production  Hematologic: No petechiae, ecchymosis or bleeding  Lymphatic:  No lymphadenopathy or lymphedema  Allergy / Immunologic:  No eczema, hives, frequent or recurrent infections    OBJECTIVE        Physical    VITALS:  Patient Vitals for the past 24 hrs:   BP Temp Temp src Pulse Resp SpO2 Weight   03/04/23 0602 -- -- -- -- 16 -- 188 lb (85.3 kg)   03/04/23 0421 -- -- -- -- 16 -- --   03/04/23 0351 112/61 97.7 °F (36.5 °C) Oral 89 16 95 % --   03/04/23 0240 -- -- -- -- 16 -- --   03/04/23 0114 -- -- -- -- 16 -- --   03/03/23 2334 -- -- -- -- 16 -- --   03/03/23 2218 -- -- -- -- 16 -- --   03/03/23 2017 -- -- -- -- 16 -- --   03/03/23 1942 118/65 99 °F (37.2 °C) Oral 84 16 95 % --   03/03/23 1803 -- -- -- -- 18 -- --   03/03/23 1733 -- -- -- -- 18 -- --   03/03/23 1656 -- -- -- -- 18 -- --   03/03/23 1626 -- -- -- -- 18 -- --   03/03/23 1446 -- -- -- -- 18 -- --   03/03/23 1349 -- -- -- -- 16 -- --   03/03/23 1319 -- -- -- -- 16 -- --   03/03/23 1315 119/63 97.8 °F (36.6 °C) Oral 70 16 96 % --   03/03/23 0956 -- -- -- -- 16 -- --   03/03/23 0926 -- -- -- -- 18 -- --   03/03/23 0845 (!) 101/57 98.1 °F (36.7 °C) Oral 65 18 95 % --         24HR INTAKE/OUTPUT:    Intake/Output Summary (Last 24 hours) at 3/4/2023 6966  Last data filed at 3/4/2023 0534  Gross per 24 hour   Intake 3067 ml   Output 3550 ml   Net -483 ml         CONSTITUTIONAL: awake, alert, cooperative, no apparent distress   EYES: pupils equal, round and reactive to light, sclera clear and conjunctiva normal  ENT: Normocephalic, without obvious abnormality, atraumatic  NECK: supple, symmetrical, no jugular venous distension and no carotid bruits   HEMATOLOGIC/LYMPHATIC: no cervical, supraclavicular or axillary lymphadenopathy   LUNGS: no increased work of breathing and clear to auscultation   CARDIOVASCULAR: regular rate and rhythm, normal S1 and S2, no murmur noted  ABDOMEN: normal bowel sounds x 4, soft, non-distended, non-tender, no masses palpated, no hepatosplenomgaly   MUSCULOSKELETAL: full range of motion noted, tone is normal  NEUROLOGIC: awake, alert, oriented to name, place and time. Motor skills grossly intact. SKIN: Normal skin color, texture, turgor and no jaundice.  appears intact   EXTREMITIES: no LE edema     DATA:  CBC:    Recent Labs     03/03/23  0620 03/02/23  0525 03/01/23  0553 02/28/23  1640   WBC 11.7* 9.8 11.4* 14.6*   NEUTROABS 5.4 4.2 5.7 12.0*   LYMPHOPCT 39.8 38.6 34.7 9.0   RBC 2.18* 2.15* 2.10* 2.46*   HGB 7.9* 7.8* 7.5* 8.7*   HCT 22.1* 22.5* 21.4* 24.5*   .7* 104.8* 101.7* 99.4   MCH 36.1* 36.5* 35.7* 35.3*   MCHC 35.5 34.9 35.2 35.5   RDW 23.3* 23.6* 24.2* 24.2*   * 385 389 415 PT/INR:    Recent Labs     03/03/23  0620 02/28/23  1640 02/25/23  0528 02/24/23  0533 02/23/23  0530 02/18/23  0228 02/17/23  0305   PROT 6.0* 8.0 6.5 5.8* 5.6*   < > 6.7   INR  --   --   --   --   --   --  1.25*    < > = values in this interval not displayed. PTT:    Recent Labs     02/17/23  0305   APTT 31.2         CMP:    Recent Labs     03/03/23  0620 03/02/23  0525 03/01/23  0553 02/28/23  1640 02/25/23 0528 02/24/23 0533 02/23/23  0530    136 137 139 140 138 137   K 4.1 4.6 4.2 4.6 4.3 4.0 3.9    105 107 103 104 105 105   CO2 24 22 20* 25 27 24 24   GLUCOSE 105* 122* 84 103* 91 92 99   BUN 6* 7 12 9 7 7 7   CREATININE 0.6* 0.6* 0.6* 0.6* 0.7* 0.7* 0.6*   LABGLOM >60 >60 >60 >60 >60 >60 >60   CALCIUM 9.1 8.9 8.5 10.1 9.5 9.3 8.8   PROT 6.0*  --   --  8.0 6.5 5.8* 5.6*   LABALBU 4.1  --   --  4.8 4.2 3.5 3.7   AGRATIO  --   --   --  1.5 1.8 1.5 1.9   BILITOT 3.9*  --   --  6.2* 3.7* 3.6* 3.2*   ALKPHOS 67  --   --  83 70 68 60   ALT 16  --   --  30 31 33 34   AST 26  --   --  59* 33 40* 47*         Lab Results   Component Value Date    CALCIUM 9.1 03/03/2023    PHOS 4.5 10/20/2021       LDH:No results for input(s): LDH in the last 720 hours. Radiology Review:  XR CHEST PORTABLE  Narrative: EXAMINATION:  ONE XRAY VIEW OF THE CHEST    2/22/2023 8:14 am    COMPARISON:  02/17/2023 radiograph    HISTORY:  ORDERING SYSTEM PROVIDED HISTORY: Chest Discomfort  TECHNOLOGIST PROVIDED HISTORY:  Reason for exam:->Chest Discomfort  Reason for Exam: chest discomfort    FINDINGS:  The cardiac silhouette is prominent, likely magnified by portable technique. Mediastinum and pulmonary vascular markings are normal.  No acute airspace  abnormality. No significant skeletal finding. Impression: The lungs appear clear with no acute pulmonary finding.       Problem List  Patient Active Problem List   Diagnosis    Sickle cell pain crisis ([facility] [address]    Asthma    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 ([facility] [address] Sepsis ([facility] [address]    Opiate overdose (HCC)    Opiate antagonist poisoning, accidental or unintentional, initial encounter    Leukocytosis    Hypoxia    Chronic anemia    Other chest pain    Pneumonia due to infectious organism    Fever    Chronic prescription opiate use    Right leg pain    Dental infection    Sickle cell crisis ([facility] [address]    History of DVT in adulthood    Overweight (BMI 25.0-29. 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 -IVF and pain meds  -has appt with priapism specialist later this month    Suspected sinusitis    - po levaquin      ONCOLOGIC DISPOSITION: once pain improved, otherwise no oncology barriers to discharge. Per d/w pts hematologist Dr. Jaylon Hermosillo, he is due for medication refills on Monday.        Lui Zhang MD  Please contact through [facility]

## 2023-03-04 NOTE — FLOWSHEET NOTE
03/04/23 0830   Vital Signs   Temp 98 °F (36.7 °C)   Temp Source Oral   Heart Rate 74   Heart Rate Source Monitor   Resp 16   /61   MAP (Calculated) 79   BP Location Left upper arm   BP Method Automatic   Patient Position Sitting   Level of Consciousness 0   MEWS Score 1   Pain Assessment   Pain Assessment 0-10   Pain Level 9   Pain Location Hip   Pain Orientation Right   Oxygen Therapy   SpO2 96 %   O2 Device None (Room air)      Pt assessment completed, vss, see flow sheet. Pt alert and oriented x 4. Pt denies any needs at this time.  Samuel Ambriz, RN

## 2023-03-04 NOTE — FLOWSHEET NOTE
03/03/23 1942   Vital Signs   Temp 99 °F (37.2 °C)   Temp Source Oral   Heart Rate 84   Heart Rate Source Monitor   Resp 16   /65   MAP (Calculated) 83   BP Location Right upper arm   Patient Position Semi fowlers   Level of Consciousness 0   MEWS Score 1   Oxygen Therapy   SpO2 95 %   O2 Device None (Room air)   Assessment complete- see flowsheets. PRN med given per pt request within PRN parameters, see eMAR and flowsheets for associated medication administration information. Pt aware to call for any needs or changes and call light within reach, telemetry in place. Will continue to monitor.   Oz Hernandez RN

## 2023-03-04 NOTE — PROGRESS NOTES
IM Progress Note    Admit Date:  2/28/2023    Admitted with sickle cell pain crisis and priapism    Subjective:  Mr. Poornima Watts to have pain    Objective:   /61   Pulse 74   Temp 98 °F (36.7 °C) (Oral)   Resp 16   Ht 5' 9\" (1.753 m)   Wt 188 lb (85.3 kg)   SpO2 96%   BMI 27.76 kg/m²     Intake/Output Summary (Last 24 hours) at 3/4/2023 0956  Last data filed at 3/4/2023 0534  Gross per 24 hour   Intake 2827 ml   Output 3550 ml   Net -723 ml           Physical Exam:  Gen: 50-year-old -American male , in no distress. alert and well-oriented  Eyes: PERRL. No sclera icterus. No conjunctival injection. ENT: No discharge. Pharynx clear. Neck: Trachea midline. Normal thyroid. Resp: No accessory muscle use. No crackles. No wheezes. No rhonchi. No dullness on percussion. CV: Regular rate. Regular rhythm. No murmur or rub. No edema. GI: Non-tender. Non-distended. No masses. No organomegaly. Normal bowel sounds. No hernia.  : Deferred  Skin: Warm and dry. No nodule on exposed extremities. No rash on exposed extremities. Lymph: No cervical LAD. No supraclavicular LAD. M/S: No cyanosis. No joint deformity. No clubbing. Intact peripheral pulses. Brisk cap refill, < 2 secs  Neuro: Awake. Reflexes 2+ symmetric bilaterally   Psych: Oriented x 3. No anxiety or agitation.        Medications:   Scheduled Meds:   levoFLOXacin  500 mg Oral Daily    lidocaine PF  2 mL IntraDERmal Once    phenylephrine  200 mcg IntraCAVernosal Once    phenylephrine  200 mcg IntraCAVernosal Once    hydroxyurea  1,000 mg Oral BID    folic acid  2 mg Oral Daily    apixaban  5 mg Oral BID    sodium chloride flush  5-40 mL IntraVENous 2 times per day    sildenafil  20 mg Oral Daily       Continuous Infusions:   sodium chloride 125 mL/hr at 03/04/23 0535    sodium chloride         Data:  CBC:   Recent Labs     03/02/23  0525 03/03/23  0620   WBC 9.8 11.7*   RBC 2.15* 2.18*   HGB 7.8* 7.9*   HCT 22.5* 22.1*   MCV 104.8* 101.7*   RDW 23.6* 23.3*    458*       BMP:   Recent Labs     03/02/23  0525 03/03/23  0620    139   K 4.6 4.1    105   CO2 22 24   BUN 7 6*   CREATININE 0.6* 0.6*       BNP: No results for input(s): BNP in the last 72 hours.  PT/INR: No results for input(s): PROTIME, INR in the last 72 hours.  APTT: No results for input(s): APTT in the last 72 hours.  CARDIAC ENZYMES: No results for input(s): CKMB, CKMBINDEX, TROPONINI in the last 72 hours.    Invalid input(s): CKTOTAL;3  FASTING LIPID PANEL:No results found for: CHOL, HDL, TRIG  LIVER PROFILE:   Recent Labs     03/03/23  0620   AST 26   ALT 16   BILIDIR 0.5*   BILITOT 3.9*   ALKPHOS 67            Cultures  COVID/Influenza: not detected       Radiology  No orders to display         Assessment:  Principal Problem:    Sickle cell pain crisis (HCC)  Resolved Problems:    * No resolved hospital problems. *      Plan:    #Sickle cell pain crisis  #Sickle cell anemia   -Recurrent admissions for sickle cell pain crisis   - complains of severe pain all over, increased left hip pain  -longstanding issue of med non-compliance   -Heme-onc consulted  -Monitor H/H .  Transfuse as needed.  He has required transfusions in previous admissions.  Hemoglobin stable today  -Continue aggressive IV fluid hydration .  -On IV Dilaudid 1 mg every 3 hours as needed for pain , alternating with oxycodone 20 mg every 3 hours as needed for pain .  Dose has been adjusted and frequency increased since admission.  Continue same dosing of pain medications  - Supposed to be on chronic Hydrea for sickle cell anemia.  Concern about compliance.  Continue Hydrea 1000 mg twice daily.   Decrease dilaudid to q 4 prn     # Priapism.    -Recurrent issue.  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  Patient not compliant with prescribed sildenafil  - Consulted urology.  - Continued on sildenafil, and Sudafed as needed.  -Urology  has recommended outpatient follow-up for definitive surgical management  - he has had success in past with sudafed prn , alkalinization, ice packs and supplemental O2     #Pulmonary Embolism  -Recent diagnosis  Prior history of DVT  -   Started on Eliquis during last admission. Continue     #LFT elevation, chronic  --likely 2/2 sickle cell  -patient denies RUQ pain   -Monitor     #Mild leukocytosis  -Reactive secondary to sickle cell crisis  Monitor, improving. #Asthma  -without acute exacerbation   -continue home inhalers     #GERD  -no longer taking PPI      #Vitamin D deficiency      #History of splenectomy    Acute sinusitis   levaquin     # Pt  has a management plan in place        Full Code   ADULT DIET;  Regular        Abby Oviedo MD   3/4/2023 9:56 AM

## 2023-03-05 VITALS
WEIGHT: 188 LBS | OXYGEN SATURATION: 95 % | RESPIRATION RATE: 16 BRPM | HEART RATE: 73 BPM | BODY MASS INDEX: 27.85 KG/M2 | SYSTOLIC BLOOD PRESSURE: 121 MMHG | TEMPERATURE: 98 F | HEIGHT: 69 IN | DIASTOLIC BLOOD PRESSURE: 82 MMHG

## 2023-03-05 PROCEDURE — 6370000000 HC RX 637 (ALT 250 FOR IP): Performed by: INTERNAL MEDICINE

## 2023-03-05 PROCEDURE — 2580000003 HC RX 258: Performed by: INTERNAL MEDICINE

## 2023-03-05 PROCEDURE — 99238 HOSP IP/OBS DSCHRG MGMT 30/<: CPT | Performed by: INTERNAL MEDICINE

## 2023-03-05 PROCEDURE — 6360000002 HC RX W HCPCS: Performed by: INTERNAL MEDICINE

## 2023-03-05 RX ORDER — LEVOFLOXACIN 500 MG/1
500 TABLET, FILM COATED ORAL DAILY
Qty: 3 TABLET | Refills: 0 | Status: SHIPPED | OUTPATIENT
Start: 2023-03-06 | End: 2023-03-09

## 2023-03-05 RX ADMIN — FOLIC ACID 2 MG: 1 TABLET ORAL at 07:57

## 2023-03-05 RX ADMIN — Medication 10 ML: at 00:55

## 2023-03-05 RX ADMIN — OXYCODONE HYDROCHLORIDE 20 MG: 15 TABLET ORAL at 11:36

## 2023-03-05 RX ADMIN — SILDENAFIL 20 MG: 20 TABLET, FILM COATED ORAL at 07:57

## 2023-03-05 RX ADMIN — APIXABAN 5 MG: 5 TABLET, FILM COATED ORAL at 07:57

## 2023-03-05 RX ADMIN — OXYCODONE HYDROCHLORIDE 20 MG: 15 TABLET ORAL at 02:11

## 2023-03-05 RX ADMIN — OXYCODONE HYDROCHLORIDE 20 MG: 15 TABLET ORAL at 07:57

## 2023-03-05 RX ADMIN — LEVOFLOXACIN 500 MG: 500 TABLET, FILM COATED ORAL at 07:57

## 2023-03-05 RX ADMIN — HYDROXYUREA 1000 MG: 500 CAPSULE ORAL at 07:57

## 2023-03-05 RX ADMIN — HYDROMORPHONE HYDROCHLORIDE 1 MG: 1 INJECTION, SOLUTION INTRAMUSCULAR; INTRAVENOUS; SUBCUTANEOUS at 05:12

## 2023-03-05 RX ADMIN — SODIUM CHLORIDE: 9 INJECTION, SOLUTION INTRAVENOUS at 02:16

## 2023-03-05 RX ADMIN — HYDROMORPHONE HYDROCHLORIDE 1 MG: 1 INJECTION, SOLUTION INTRAMUSCULAR; INTRAVENOUS; SUBCUTANEOUS at 09:16

## 2023-03-05 RX ADMIN — HYDROMORPHONE HYDROCHLORIDE 1 MG: 1 INJECTION, SOLUTION INTRAMUSCULAR; INTRAVENOUS; SUBCUTANEOUS at 00:55

## 2023-03-05 ASSESSMENT — PAIN DESCRIPTION - ONSET
ONSET: ON-GOING
ONSET: ON-GOING

## 2023-03-05 ASSESSMENT — PAIN - FUNCTIONAL ASSESSMENT
PAIN_FUNCTIONAL_ASSESSMENT: ACTIVITIES ARE NOT PREVENTED

## 2023-03-05 ASSESSMENT — PAIN DESCRIPTION - DESCRIPTORS
DESCRIPTORS: ACHING
DESCRIPTORS: ACHING
DESCRIPTORS: ACHING;SHARP
DESCRIPTORS: ACHING;SHARP

## 2023-03-05 ASSESSMENT — PAIN DESCRIPTION - ORIENTATION
ORIENTATION: RIGHT

## 2023-03-05 ASSESSMENT — PAIN DESCRIPTION - LOCATION
LOCATION: HIP

## 2023-03-05 ASSESSMENT — PAIN SCALES - GENERAL
PAINLEVEL_OUTOF10: 8
PAINLEVEL_OUTOF10: 9
PAINLEVEL_OUTOF10: 9

## 2023-03-05 ASSESSMENT — PAIN DESCRIPTION - PAIN TYPE
TYPE: ACUTE PAIN;CHRONIC PAIN
TYPE: ACUTE PAIN;CHRONIC PAIN

## 2023-03-05 ASSESSMENT — PAIN DESCRIPTION - FREQUENCY
FREQUENCY: CONTINUOUS
FREQUENCY: CONTINUOUS

## 2023-03-05 NOTE — FLOWSHEET NOTE
03/04/23 2037   Vital Signs   Temp 98.3 °F (36.8 °C)   Temp Source Oral   Heart Rate 89   Heart Rate Source Monitor   Resp 16   /65   MAP (Calculated) 79   BP Location Left upper arm   Patient Position Semi fowlers   Level of Consciousness 0   MEWS Score 1   Oxygen Therapy   SpO2 95 %   O2 Device None (Room air)   Assessment complete- see flowsheets. Pt resting in bed, PRN med given at this time per pt request within PRN order parameters. Call light within reach. Pt aware to call for any needs or changes. Will continue to monitor.   Barak Hilton RN

## 2023-03-05 NOTE — DISCHARGE SUMMARY
Hospital Medicine Discharge Summary    Patient ID: [facility]      Patient's PCP: Kina Barnard MD    Admit Date: 2/22/2023     Discharge Date: 2/25/2023      Admitting Provider: Dalton Stratton MD     Discharge Provider: Dalton Stratton MD     Discharge Diagnoses: Active Hospital Problems    Diagnosis     Sickle cell crisis ([address] [D57.00]        The patient was seen and examined on day of discharge and this discharge summary is in conjunction with any daily progress note from day of discharge. Hospital Course:   20 y.o. male who presents to the emergency department with worsening chest pain then when he was discharged from this hospital with a diagnosis of bilateral pulmonary emboli. He was admitted from February 17 and discharged on the 18th with Eliquis. He states his chest pain has never gone away and is actually worse today. He is also complaining of a persistent painful erection that started at 6 AM.  He has a history of sickle cell and has frequent hospitalizations and ED visits for priapism where he often has to have it detumesced. He is supposed be taking Hydrea and sildenafil but he is noncompliant. He has been referred to the sickle cell/priapism clinic at [facility] and he has yet to go there. He has been dealing with priapism issues for the last year. He states when he was about 7years old he had priapism issues that subsequently went away and he has not had any problems with this since up until about a year ago.   He denies lightheadedness, dizziness, shortness of breath, leg swelling, nausea or vomiting.     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 - I encouraged Javier to follow up with Dr Jimenez Herrera in office as has been recommended in the past.  Daily sildenafil would be helpful to try and prevent recurrence   - urology will sign off, call with questions. Oncology consult      Sickle Cell Crisis  Priapism  - recurrent ischemic priapism 2/2 sickle cell   - on eliquis   - follows with [facility] outpatient; history of noncompliance with hydrea and sildenafil   - phenylephrine administered 02/22 for priapism; could consider addition of sudafed PRN as this has worked on previous admissions.   -Transfusion indicated if HGB <6 g/dL. -Patient is symptomatically better and eager to get back to work. Counseled him again regarding the importance of compliance with the medications.  -Reviewed the hematological parameters-stable for discharge. 2.Macrocytic anemia  -To continue with D95 and folic acid to promote hematopoiesis.  -No transfusion requirements today. Physical Exam Performed:     /74   Pulse 67   Temp 98.2 °F (36.8 °C) (Oral)   Resp 16   Ht 5' 9\" (1.753 m)   Wt 191 lb 3.2 oz (86.7 kg)   SpO2 97%   BMI 28.24 kg/m²       General appearance:  No apparent distress, appears stated age and cooperative. HEENT:  Normal cephalic, atraumatic without obvious deformity. Pupils equal, round, and reactive to light. Extra ocular muscles intact. Conjunctivae/corneas clear. Neck: Supple, with full range of motion. No jugular venous distention. Trachea midline. Respiratory:  Normal respiratory effort. Clear to auscultation, bilaterally without Rales/Wheezes/Rhonchi. Cardiovascular:  Regular rate and rhythm with normal S1/S2 without murmurs, rubs or gallops.   Abdomen: Soft, non-tender, non-distended with normal bowel sounds. Musculoskeletal:  No clubbing, cyanosis or edema bilaterally. Full range of motion without deformity. Skin: Skin color, texture, turgor normal.  No rashes or lesions. Neurologic:  Neurovascularly intact without any focal sensory/motor deficits. Cranial nerves: II-XII intact, grossly non-focal.  Psychiatric:  Alert and oriented, thought content appropriate, normal insight  Capillary Refill: Brisk,< 3 seconds   Peripheral Pulses: +2 palpable, equal bilaterally       Labs: For convenience and continuity at follow-up the following most recent labs are provided:      CBC:    Lab Results   Component Value Date/Time    WBC 11.7 03/03/2023 06:20 AM    HGB 7.9 03/03/2023 06:20 AM    HCT 22.1 03/03/2023 06:20 AM     03/03/2023 06:20 AM       Renal:    Lab Results   Component Value Date/Time     03/03/2023 06:20 AM    K 4.1 03/03/2023 06:20 AM     03/03/2023 06:20 AM    CO2 24 03/03/2023 06:20 AM    BUN 6 03/03/2023 06:20 AM    CREATININE 0.6 03/03/2023 06:20 AM    CALCIUM 9.1 03/03/2023 06:20 AM    PHOS 4.5 10/20/2021 06:39 AM         Significant Diagnostic Studies    Radiology:   XR CHEST PORTABLE   Final Result   The lungs appear clear with no acute pulmonary finding. Consults:     PHARMACY TO DOSE MEDICATION  IP CONSULT TO HOSPITALIST  IP CONSULT TO ONCOLOGY  IP CONSULT TO UROLOGY    Disposition:       Condition at Discharge: Stable    Discharge Instructions/Follow-up:      Code Status:  Prior     Activity: activity as tolerated    Diet: cardiac diet      Discharge Medications:     Discharge Medication List as of 2/25/2023  1:22 PM             Details   apixaban starter pack (ELIQUIS) 5 MG TBPK tablet Take 2 tablets by mouth 2 times daily for 7 days, THEN 1 tablet 2 times daily for 23 days.  Patient will take 2 tablets bid for 6.5 days as he had his first dose of Elqiuis in the hospital., Disp-72 tablet, R-0Normal      hydroxyurea (HYDREA) 500 MG chemo capsule Take 2 capsules by mouth 2 times dailyHistorical Med      pseudoephedrine (SUDAFED) 60 MG tablet Take 1 tablet by mouth daily as needed (priapism)Historical Med      sildenafil (REVATIO) 20 MG tablet Take 1 tablet by mouth daily, Disp-30 tablet, [id number]      folic acid (FOLVITE) 1 MG tablet Take 2 tablets by mouth daily, Disp-30 tablet, R-3Normal      oxyCODONE HCl (OXY-IR) 10 MG immediate release tablet Take 20 mg by mouth every 4 hours as needed for Pain. Patient states he takes 20mg q 4 hours as neededHistorical Med      albuterol sulfate HFA (PROVENTIL;VENTOLIN;PROAIR) 108 (90 Base) MCG/ACT inhaler Albuterol HFA Inhaler 90 mcg/actuation  inhaled  2 puffs prn     ActiveHistorical Med      levalbuterol (XOPENEX) 0.31 MG/3ML nebulization Levalbuterol Nebulized    2 puffs prn     ActiveHistorical Med             Time Spent on discharge:  in the examination, evaluation, counseling and review of medications and discharge plan. Signed: Cecil Lin MD   3/5/2023      Thank you Natasha Dunbar MD for the opportunity to be involved in this patient's care. If you have any questions or concerns, please feel free to contact me at [phone].

## 2023-03-05 NOTE — PLAN OF CARE
Problem: Pain  Goal: Verbalizes/displays adequate comfort level or baseline comfort level  3/4/2023 1933 by Qi Henderson RN  Outcome: Progressing  3/4/2023 1307 by Arron Carlos RN  Outcome: Progressing     Problem: Safety - Adult  Goal: Free from fall injury  3/4/2023 1933 by Qi Henderson RN  Outcome: Progressing  3/4/2023 1307 by Arron Carlos RN  Outcome: Progressing     Problem: ABCDS Injury Assessment  Goal: Absence of physical injury  3/4/2023 1933 by Qi Henderson RN  Outcome: Progressing  3/4/2023 1307 by Arron Carlos RN  Outcome: Progressing

## 2023-03-05 NOTE — PROGRESS NOTES
ONCOLOGY HEMATOLOGY CARE PROGRESS NOTE      SUBJECTIVE:    Remains admitted. Afebrile. Pain has gradually improved. D/W Dr. Helga Carrasquillo, his hematologist, and he will be due for pain medication refill on Monday. Continues to require IV dilaudid. ROS:     Constitutional:  No weight loss, No fever, No chills, No night sweats. Energy level good.   Eyes:  No impairment or change in vision  ENT / Mouth:  No pain, abnormal ulceration, bleeding, nasal drip or change in voice or hearing  Cardiovascular:  No chest pain, palpitations, new edema, or calf discomfort  Respiratory:  No pain, hemoptysis, change to breathing  Breast:  No pain, discharge, change in appearance or texture  Gastrointestinal:  No pain, cramping, jaundice, change to eating and bowel habits  Urinary:  No pain, bleeding or change in continence  Genitalia: No pain, bleeding or discharge  Musculoskeletal:  No redness, pain, edema or weakness  Skin:  No pruritus, rash, change to nodules or lesions  Neurologic:  No discomfort, change in mental status, speech, sensory or motor activity  Psychiatric:  No change in concentration or change to affect or mood  Endocrine:  No hot flashes, increased thirst, or change to urine production  Hematologic: No petechiae, ecchymosis or bleeding  Lymphatic:  No lymphadenopathy or lymphedema  Allergy / Immunologic:  No eczema, hives, frequent or recurrent infections    OBJECTIVE        Physical    VITALS:  Patient Vitals for the past 24 hrs:   BP Temp Temp src Pulse Resp SpO2   03/05/23 0745 121/82 98 °F (36.7 °C) Oral 73 16 95 %   03/05/23 0512 -- -- -- -- 14 --   03/05/23 0241 -- -- -- -- 16 --   03/05/23 0211 129/75 98.5 °F (36.9 °C) Oral 77 16 95 %   03/05/23 0125 -- -- -- -- 16 --   03/04/23 2236 -- -- -- -- 16 --   03/04/23 2112 -- -- -- -- 16 --   03/04/23 2037 106/65 98.3 °F (36.8 °C) Oral 89 16 95 %   03/04/23 1500 (!) 115/58 98 °F (36.7 °C) Oral 95 16 96 %   03/04/23 1432 -- -- -- -- 18 --         24HR INTAKE/OUTPUT:    Intake/Output Summary (Last 24 hours) at 3/5/2023 0836  Last data filed at 3/5/2023 0342  Gross per 24 hour   Intake 1998 ml   Output 975 ml   Net 1023 ml         CONSTITUTIONAL: awake, alert, cooperative, no apparent distress   EYES: pupils equal, round and reactive to light, sclera clear and conjunctiva normal  ENT: Normocephalic, without obvious abnormality, atraumatic  NECK: supple, symmetrical, no jugular venous distension and no carotid bruits   HEMATOLOGIC/LYMPHATIC: no cervical, supraclavicular or axillary lymphadenopathy   LUNGS: no increased work of breathing and clear to auscultation   CARDIOVASCULAR: regular rate and rhythm, normal S1 and S2, no murmur noted  ABDOMEN: normal bowel sounds x 4, soft, non-distended, non-tender, no masses palpated, no hepatosplenomgaly   MUSCULOSKELETAL: full range of motion noted, tone is normal  NEUROLOGIC: awake, alert, oriented to name, place and time. Motor skills grossly intact. SKIN: Normal skin color, texture, turgor and no jaundice. appears intact   EXTREMITIES: no LE edema     DATA:  CBC:    Recent Labs     03/03/23  0620 03/02/23  0525 03/01/23  0553 02/28/23  1640   WBC 11.7* 9.8 11.4* 14.6*   NEUTROABS 5.4 4.2 5.7 12.0*   LYMPHOPCT 39.8 38.6 34.7 9.0   RBC 2.18* 2.15* 2.10* 2.46*   HGB 7.9* 7.8* 7.5* 8.7*   HCT 22.1* 22.5* 21.4* 24.5*   .7* 104.8* 101.7* 99.4   MCH 36.1* 36.5* 35.7* 35.3*   MCHC 35.5 34.9 35.2 35.5   RDW 23.3* 23.6* 24.2* 24.2*   * 385 389 415         PT/INR:    Recent Labs     03/03/23  0620 02/28/23  1640 02/25/23  0528 02/24/23  0533 02/23/23  0530 02/18/23  0228 02/17/23  0305   PROT 6.0* 8.0 6.5 5.8* 5.6*   < > 6.7   INR  --   --   --   --   --   --  1.25*    < > = values in this interval not displayed.        PTT:    Recent Labs     02/17/23  0305   APTT 31.2         CMP:    Recent Labs     03/03/23  0620 03/02/23  0525 03/01/23  0553 02/28/23  1640 02/25/23  0528 02/24/23  0533 02/23/23  0530    136 137 139 140 138 137   K 4.1 4.6 4.2 4.6 4.3 4.0 3.9    105 107 103 104 105 105   CO2 24 22 20* 25 27 24 24   GLUCOSE 105* 122* 84 103* 91 92 99   BUN 6* 7 12 9 7 7 7   CREATININE 0.6* 0.6* 0.6* 0.6* 0.7* 0.7* 0.6*   LABGLOM >60 >60 >60 >60 >60 >60 >60   CALCIUM 9.1 8.9 8.5 10.1 9.5 9.3 8.8   PROT 6.0*  --   --  8.0 6.5 5.8* 5.6*   LABALBU 4.1  --   --  4.8 4.2 3.5 3.7   AGRATIO  --   --   --  1.5 1.8 1.5 1.9   BILITOT 3.9*  --   --  6.2* 3.7* 3.6* 3.2*   ALKPHOS 67  --   --  83 70 68 60   ALT 16  --   --  30 31 33 34   AST 26  --   --  59* 33 40* 47*         Lab Results   Component Value Date    CALCIUM 9.1 03/03/2023    PHOS 4.5 10/20/2021       LDH:No results for input(s): LDH in the last 720 hours. Radiology Review:  XR CHEST PORTABLE  Narrative: EXAMINATION:  ONE XRAY VIEW OF THE CHEST    2/22/2023 8:14 am    COMPARISON:  02/17/2023 radiograph    HISTORY:  ORDERING SYSTEM PROVIDED HISTORY: Chest Discomfort  TECHNOLOGIST PROVIDED HISTORY:  Reason for exam:->Chest Discomfort  Reason for Exam: chest discomfort    FINDINGS:  The cardiac silhouette is prominent, likely magnified by portable technique. Mediastinum and pulmonary vascular markings are normal.  No acute airspace  abnormality. No significant skeletal finding. Impression: The lungs appear clear with no acute pulmonary finding. Problem List  Patient Active Problem List   Diagnosis    Sickle cell pain crisis ([facility]    Asthma    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 ([facility]    Sepsis ([facility]    Opiate overdose (HCC)    Opiate antagonist poisoning, accidental or unintentional, initial encounter    Leukocytosis    Hypoxia    Chronic anemia    Other chest pain    Pneumonia due to infectious organism    Fever    Chronic prescription opiate use    Right leg pain    Dental infection    Sickle cell crisis ([facility]    History of DVT in adulthood    Overweight (BMI 25.0-29. 9)    Chest pain    Acute electrocardiogram changes    Acute chest syndrome due to sickle-cell disease (HCC)    Priapism    Intractable pain    Gastroesophageal reflux disease without esophagitis    Non-compliance    Sickle cell anemia (HCC)    Pulmonary embolism, other, unspecified chronicity, unspecified whether acute cor pulmonale present (HCC)    Acute hypoxemic respiratory failure (HCC)    Right-sided chest pain    Pulmonary embolus (HCC)    Acute sickle cell crisis ([facility]       ASSESSMENT AND PLAN:    Sickle Cell Crisis  Priapism  PE     - recurrent ischemic priapism 2/2 sickle cell   - on eliquis for recent PE diagnosis  - follows with [facility] outpatient; history of noncompliance with hydrea and sildenafil   - phenylephrine administered 02/2028 for priapism; could consider addition of sudafed PRN as this has worked on previous admissions  - urology is following for his priapism  -Transfusion indicated if HGB <6 g/dL. -IVF and pain meds  -has appt with priapism specialist later this month    Suspected sinusitis    - po levaquin started 3/4/23      ONCOLOGIC DISPOSITION: once pain improved, otherwise no oncology barriers to discharge. Per d/w pts hematologist Dr. Torrie De Anda, he is due for medication refills on Monday.        Kaelyn Rosales MD  Please contact through [facility]

## 2023-03-05 NOTE — CARE COORDINATION
DISCHARGE ORDER  Date/Time 3/5/2023 11:07 AM  Completed by: Lazaro Ott RN, Case Management    Patient Name: [facility]      : 2000  Admitting Diagnosis: Sickle cell pain crisis ([facility] [D57.00]  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 ([facility] [D57.1, N48.32]      Admit order Date and Status:INPT 23  (verify MD's last order for status of admission)      Noted discharge order. If applicable PT/OT recommendation at Discharge: n/a  DME recommendation by PT/OT:n/a  Confirmed discharge plan  : Yes  with whom_with pt______________  If pt confirmed DC plan does family need to be contacted by CM No if yes who______  Discharge Plan: Pt plans to return home at discharge. Denies needs. IPTA. Date of Last IMM Given: n/a    Reviewed chart. Role of discharge planner explained and patient verbalized understanding. Discharge order is noted. Has Home O2 in place on admit:  No  Informed of need to bring portable home O2 tank on day of discharge for nursing to connect prior to leaving:   Not Indicated  Verbalized agreement/Understanding:   Not Indicated  Pt is being d/c'd to home today. Pt's O2 sats are 95% on roomair. Discharge timeout done with Maine Giron RN. All discharge needs and concerns addressed.

## 2023-03-05 NOTE — DISCHARGE SUMMARY
Name:  Payton Covarrubias  Room:  0219/0219-01  MRN:    [id number]    Discharge Summary      This discharge summary is in conjunction with a complete physical exam done on the day of discharge. Discharging Physician: Bailee Varghese MD      Admit: 2/28/2023  Discharge:  3/5/2023     Diagnoses this Admission    Principal Problem:    Sickle cell pain crisis ([address]  Resolved Problems:    * No resolved hospital problems. *          Procedures (Please Review Full Report for Details)      Consults    IP CONSULT TO HEM/ONC  IP CONSULT TO ONCOLOGY  IP CONSULT TO UROLOGY      HPI:  48-year-old patient known to us from previous admissions, with history of sickle cell anemia and recurrent admissions for sickle cell pain crisis, associated with priapism. Followed by oncologist Dr. Olvin Lincoln, and due for follow-up with urology for surgery. Patient is supposed to be on Hydrea and sildenafil-not compliant with medications. Recent admission for PE on anticoagulation. He has a management plan in place and hence no pain medication prescriptions given on discharge. Presented back to the emergency department yesterday with complaints of pain and priapism. .  Got intracavernous phenylephrine. Patient had persistent pain; started on IV fluids and IV Dilaudid and admitted     Patient complains of recurrent priapism today; Sudafed given with no improvement, I have  requested urology consult. .  Getting Dilaudid and oxycodone 20 mg alternately;  complains of persistent pain. Complains of pain all over, increased left hip pain          Gen: no distress. alert and well-oriented  Eyes: PERRL. No sclera icterus. No conjunctival injection. ENT: No discharge. Pharynx clear. Neck: Trachea midline. Normal thyroid. Resp: No accessory muscle use. No crackles. No wheezes. No rhonchi. No dullness on percussion. CV: Regular rate. Regular rhythm. No murmur or rub. No edema. GI: Non-tender. Non-distended. No masses.  No organomegaly. Normal bowel sounds. No hernia.  : Deferred  Skin: Warm and dry. No nodule on exposed extremities. No rash on exposed extremities. Lymph: No cervical LAD. No supraclavicular LAD. M/S: No cyanosis. No joint deformity. No clubbing. Intact peripheral pulses. Brisk cap refill, < 2 secs  Neuro: Awake. Reflexes 2+ symmetric bilaterally   Psych: Oriented x 3. No anxiety or agitation. Hospital Course      #Sickle cell pain crisis  #Sickle cell anemia   -Recurrent admissions for sickle cell pain crisis   - complains of severe pain all over, increased left hip pain  -longstanding issue of med non-compliance   -Heme-onc consulted  -Monitor H/H . Transfuse as needed. He has required transfusions in previous admissions. Hemoglobin stable today  -Continue aggressive IV fluid hydration .  -On IV Dilaudid 1 mg every 3 hours as needed for pain , alternating with oxycodone 20 mg every 3 hours as needed for pain . Dose has been adjusted and frequency increased since admission. Continue same dosing of pain medications  - Supposed to be on chronic Hydrea for sickle cell anemia. Concern about compliance. Continue Hydrea 1000 mg twice daily. Decrease dilaudid to q 4 prn  Doing better today     # Priapism. -Recurrent issue. 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 Patient not compliant with prescribed sildenafil  - Consulted urology. - Continued on sildenafil, and Sudafed as needed. -Urology has recommended outpatient follow-up for definitive surgical management  - he has had success in past with sudafed prn , alkalinization, ice packs and supplemental O2     #Pulmonary Embolism  -Recent diagnosis  Prior history of DVT  -   Started on Eliquis during last admission.   Continue     #LFT elevation, chronic  --likely 2/2 sickle cell  -patient denies RUQ pain   -Monitor     #Mild leukocytosis  -Reactive secondary to sickle cell crisis  Monitor, improving. #Asthma  -without acute exacerbation   -continue home inhalers     #GERD  -no longer taking PPI      #Vitamin D deficiency      #History of splenectomy     Acute sinusitis   levaquin     # Pt  has a management plan in place      No orders to display          Discharge Medications     Medication List        START taking these medications      levoFLOXacin 500 MG tablet  Commonly known as: LEVAQUIN  Take 1 tablet by mouth daily for 3 doses  Start taking on: March 6, 2023            CONTINUE taking these medications      albuterol sulfate  (90 Base) MCG/ACT inhaler  Commonly known as: PROVENTIL;VENTOLIN;PROAIR     apixaban starter pack 5 MG Tbpk tablet  Commonly known as: ELIQUIS  Take 2 tablets by mouth 2 times daily for 7 days, THEN 1 tablet 2 times daily for 23 days. Patient will take 2 tablets bid for 6.5 days as he had his first dose of Elqiuis in the hospital.  Start taking on: February 18, 2023     hydroxyurea 500 MG chemo capsule  Commonly known as: HYDREA     levalbuterol 0.31 MG/3ML nebulization  Commonly known as: XOPENEX     oxyCODONE HCl 10 MG immediate release tablet  Commonly known as: OXY-IR     pseudoephedrine 60 MG tablet  Commonly known as: SUDAFED            STOP taking these medications      folic acid 1 MG tablet  Commonly known as: Migue Mendiola               Where to Get Your Medications        These medications were sent to [facility] 21 [id number] [facility], [address] [place] [address] [phone] Brea Ribeiro [phone]  [address] [place] [address], [address] [address]      Phone: [phone]   levoFLOXacin 500 MG tablet           Discharge Condition/Location: Stable    Follow Up: Follow up with PCP.         Angie Rivera MD 3/5/2023 9:28 AM

## 2023-03-05 NOTE — PROGRESS NOTES
*** given per orders for c/o pain ***/10. Patient denies present needs at this time. Call light within reach.

## 2023-03-05 NOTE — PLAN OF CARE
Problem: Discharge Planning  Goal: Discharge to home or other facility with appropriate resources  3/5/2023 1000 by Nikia Mendez RN  Outcome: Completed  3/5/2023 0957 by Nikia Mendez RN  Outcome: Progressing     Problem: Pain  Goal: Verbalizes/displays adequate comfort level or baseline comfort level  3/5/2023 1000 by Nikia Mendez RN  Outcome: Completed  3/5/2023 0957 by Nikia Mendez RN  Outcome: Progressing     Problem: Safety - Adult  Goal: Free from fall injury  3/5/2023 1000 by Nikia Mendez RN  Outcome: Completed  3/5/2023 0957 by Nikia Mendez RN  Outcome: Progressing     Problem: ABCDS Injury Assessment  Goal: Absence of physical injury  3/5/2023 1000 by Nikia Mendez RN  Outcome: Completed  3/5/2023 0957 by Nikia Mendez RN  Outcome: Progressing

## 2023-03-05 NOTE — FLOWSHEET NOTE
03/05/23 0745   Vital Signs   Temp 98 °F (36.7 °C)   Temp Source Oral   Heart Rate 73   Heart Rate Source Monitor   Resp 16   /82   MAP (Calculated) 95   BP Location Left upper arm   BP Method Automatic   Patient Position Sitting   Level of Consciousness 0   MEWS Score 1   Oxygen Therapy   SpO2 95 %   O2 Device None (Room air)     Pt assessment completed, vss, see flow sheet. Pt alert and oriented x4.  Pt denies any needs this am. Benson Jason RN

## 2023-03-05 NOTE — DISCHARGE INSTRUCTIONS
Your information:  Name: Jacinto Paulson  : 2000    Your instructions:    SEE BELOW    What to do after you leave the hospital:    Recommended diet: regular diet    Recommended activity: activity as tolerated        The following personal items were collected during your admission and were returned to you:    Belongings  Dental Appliances: None  Vision - Corrective Lenses: Eyeglasses, At home  Hearing Aid: None  Clothing: Shorts, Socks, Footwear, Shirt, Undergarments, At bedside  Jewelry: None  Body Piercings Removed: N/A  Electronic Devices: Cell Phone  Weapons (Notify Protective Services/Security): None  Other Valuables: Wallet, At home  Home Medications: None  Valuables Given To: Patient  Provide Name(s) of Who Valuable(s) Were Given To: velia  Responsible person(s) in the waiting room: velia  Patient approves for provider to speak to responsible person post operatively: Yes    Information obtained by:  By signing below, I understand that if any problems occur once I leave the hospital I am to contact MD.  I understand and acknowledge receipt of the instructions indicated above.

## 2023-03-21 NOTE — PROGRESS NOTES
Physician Progress Note      PATIENT:               Jack WHITMAN #:                  [id number]  :                       2000  ADMIT DATE:       2023 1:41 PM  [address] DATE:        3/5/2023 11:47 AM  RESPONDING  PROVIDER #:        Vladislav Rinaldi MD          QUERY TEXT:    Pt admitted with Sickle cell pain crisis and has pulmonary embolism   documented. Recent diagnosis-started on Eliquis during last   admission-continued. If possible, please document in progress notes and   discharge summary if you are evaluating and/or treating any of the following: The medical record reflects the following:  Risk Factors: prior hx of dvt, sickle cell anemia, noncompliance of meds  Clinical Indicators: Pulmonary Embolism -Recent diagnosis  Treatment: Eliquis    Thank you,  Lynn Alexis RN,BSN,CCDS,CRCR  Options provided:  -- Acute pulmonary embolism  -- Chronic pulmonary embolism  -- Other - I will add my own diagnosis  -- Disagree - Not applicable / Not valid  -- Disagree - Clinically unable to determine / Unknown  -- Refer to Clinical Documentation Reviewer    PROVIDER RESPONSE TEXT:    This patient has a chronic pulmonary embolism.     Query created by: Sonia Reveles on 3/10/2023 9:00 AM      Electronically signed by:  Vladislav Rinaldi MD 3/21/2023 11:00 AM

## 2023-04-18 ENCOUNTER — APPOINTMENT (OUTPATIENT)
Dept: CT IMAGING | Age: 23
End: 2023-04-18
Payer: COMMERCIAL

## 2023-04-18 ENCOUNTER — HOSPITAL ENCOUNTER (INPATIENT)
Age: 23
LOS: 2 days | Discharge: ANOTHER HOSPITAL | End: 2023-04-20
Attending: STUDENT IN AN ORGANIZED HEALTH CARE EDUCATION/TRAINING PROGRAM | Admitting: INTERNAL MEDICINE
Payer: COMMERCIAL

## 2023-04-18 ENCOUNTER — APPOINTMENT (OUTPATIENT)
Dept: GENERAL RADIOLOGY | Age: 23
End: 2023-04-18
Payer: COMMERCIAL

## 2023-04-18 DIAGNOSIS — D57.00 SICKLE CELL CRISIS (HCC): Primary | ICD-10-CM

## 2023-04-18 DIAGNOSIS — D72.829 LEUKOCYTOSIS, UNSPECIFIED TYPE: ICD-10-CM

## 2023-04-18 DIAGNOSIS — R07.9 CHEST PAIN, UNSPECIFIED TYPE: ICD-10-CM

## 2023-04-18 LAB
ALBUMIN SERPL-MCNC: 4.5 G/DL (ref 3.4–5)
ALBUMIN/GLOB SERPL: 1.8 {RATIO} (ref 1.1–2.2)
ALP SERPL-CCNC: 84 U/L (ref 40–129)
ALT SERPL-CCNC: 16 U/L (ref 10–40)
ANION GAP SERPL CALCULATED.3IONS-SCNC: 8 MMOL/L (ref 3–16)
ANISOCYTOSIS BLD QL SMEAR: ABNORMAL
AST SERPL-CCNC: 36 U/L (ref 15–37)
BASOPHILS # BLD: 0.3 K/UL (ref 0–0.2)
BASOPHILS NFR BLD: 1.5 %
BILIRUB SERPL-MCNC: 5.1 MG/DL (ref 0–1)
BILIRUB UR QL STRIP.AUTO: NEGATIVE
BUN SERPL-MCNC: 8 MG/DL (ref 7–20)
CALCIUM SERPL-MCNC: 9.7 MG/DL (ref 8.3–10.6)
CHLORIDE SERPL-SCNC: 106 MMOL/L (ref 99–110)
CLARITY UR: CLEAR
CO2 SERPL-SCNC: 25 MMOL/L (ref 21–32)
COLOR UR: YELLOW
CREAT SERPL-MCNC: 0.7 MG/DL (ref 0.9–1.3)
D DIMER: 2.43 UG/ML FEU (ref 0–0.6)
DEPRECATED RDW RBC AUTO: 25.3 % (ref 12.4–15.4)
EOSINOPHIL # BLD: 0.1 K/UL (ref 0–0.6)
EOSINOPHIL NFR BLD: 0.5 %
EPI CELLS #/AREA URNS HPF: NORMAL /HPF (ref 0–5)
GFR SERPLBLD CREATININE-BSD FMLA CKD-EPI: >60 ML/MIN/{1.73_M2}
GLUCOSE SERPL-MCNC: 105 MG/DL (ref 70–99)
GLUCOSE UR STRIP.AUTO-MCNC: NEGATIVE MG/DL
HCT VFR BLD AUTO: 23.3 % (ref 40.5–52.5)
HCT VFR BLD AUTO: 24.2 % (ref 40.5–52.5)
HGB BLD-MCNC: 8.5 G/DL (ref 13.5–17.5)
HGB UR QL STRIP.AUTO: ABNORMAL
HYPOCHROMIA BLD QL SMEAR: ABNORMAL
IMMATURE RETIC FRACT: 0.62 (ref 0.21–0.37)
KETONES UR STRIP.AUTO-MCNC: NEGATIVE MG/DL
LACTATE BLDV-SCNC: 0.5 MMOL/L (ref 0.4–2)
LEUKOCYTE ESTERASE UR QL STRIP.AUTO: NEGATIVE
LYMPHOCYTES # BLD: 3.1 K/UL (ref 1–5.1)
LYMPHOCYTES NFR BLD: 15.1 %
MCH RBC QN AUTO: 35.4 PG (ref 26–34)
MCHC RBC AUTO-ENTMCNC: 36.6 G/DL (ref 31–36)
MCV RBC AUTO: 96.5 FL (ref 80–100)
MONOCYTES # BLD: 1.5 K/UL (ref 0–1.3)
MONOCYTES NFR BLD: 7.3 %
NEUTROPHILS # BLD: 15.7 K/UL (ref 1.7–7.7)
NEUTROPHILS NFR BLD: 75.6 %
NITRITE UR QL STRIP.AUTO: NEGATIVE
OVALOCYTES BLD QL SMEAR: ABNORMAL
PATH INTERP BLD-IMP: YES
PH UR STRIP.AUTO: 6.5 [PH] (ref 5–8)
PLATELET # BLD AUTO: 346 K/UL (ref 135–450)
PLATELET BLD QL SMEAR: ADEQUATE
PMV BLD AUTO: 9.4 FL (ref 5–10.5)
POIKILOCYTOSIS BLD QL SMEAR: ABNORMAL
POLYCHROMASIA BLD QL SMEAR: ABNORMAL
POTASSIUM SERPL-SCNC: 4.1 MMOL/L (ref 3.5–5.1)
PROT SERPL-MCNC: 7 G/DL (ref 6.4–8.2)
PROT UR STRIP.AUTO-MCNC: ABNORMAL MG/DL
RBC # BLD AUTO: 2.42 M/UL (ref 4.2–5.9)
RBC #/AREA URNS HPF: NORMAL /HPF (ref 0–4)
RETICS # AUTO: 0.37 M/UL
RETICS/RBC NFR AUTO: 15.11 % (ref 0.5–2.18)
SICKLE CELLS BLD QL SMEAR: ABNORMAL
SLIDE REVIEW: ABNORMAL
SODIUM SERPL-SCNC: 139 MMOL/L (ref 136–145)
SP GR UR STRIP.AUTO: 1.01 (ref 1–1.03)
TARGETS BLD QL SMEAR: ABNORMAL
UA COMPLETE W REFLEX CULTURE PNL UR: ABNORMAL
UA DIPSTICK W REFLEX MICRO PNL UR: YES
URN SPEC COLLECT METH UR: ABNORMAL
UROBILINOGEN UR STRIP-ACNC: 0.2 E.U./DL
WBC # BLD AUTO: 20.8 K/UL (ref 4–11)
WBC #/AREA URNS HPF: NORMAL /HPF (ref 0–5)

## 2023-04-18 PROCEDURE — 6360000002 HC RX W HCPCS: Performed by: NURSE PRACTITIONER

## 2023-04-18 PROCEDURE — 80053 COMPREHEN METABOLIC PANEL: CPT

## 2023-04-18 PROCEDURE — 71260 CT THORAX DX C+: CPT

## 2023-04-18 PROCEDURE — 85379 FIBRIN DEGRADATION QUANT: CPT

## 2023-04-18 PROCEDURE — 96376 TX/PRO/DX INJ SAME DRUG ADON: CPT

## 2023-04-18 PROCEDURE — 87040 BLOOD CULTURE FOR BACTERIA: CPT

## 2023-04-18 PROCEDURE — 85025 COMPLETE CBC W/AUTO DIFF WBC: CPT

## 2023-04-18 PROCEDURE — 99285 EMERGENCY DEPT VISIT HI MDM: CPT

## 2023-04-18 PROCEDURE — 6360000002 HC RX W HCPCS: Performed by: STUDENT IN AN ORGANIZED HEALTH CARE EDUCATION/TRAINING PROGRAM

## 2023-04-18 PROCEDURE — 96375 TX/PRO/DX INJ NEW DRUG ADDON: CPT

## 2023-04-18 PROCEDURE — 85045 AUTOMATED RETICULOCYTE COUNT: CPT

## 2023-04-18 PROCEDURE — 36000 PLACE NEEDLE IN VEIN: CPT

## 2023-04-18 PROCEDURE — 81001 URINALYSIS AUTO W/SCOPE: CPT

## 2023-04-18 PROCEDURE — 2580000003 HC RX 258: Performed by: NURSE PRACTITIONER

## 2023-04-18 PROCEDURE — 1200000000 HC SEMI PRIVATE

## 2023-04-18 PROCEDURE — 36415 COLL VENOUS BLD VENIPUNCTURE: CPT

## 2023-04-18 PROCEDURE — 71045 X-RAY EXAM CHEST 1 VIEW: CPT

## 2023-04-18 PROCEDURE — 6360000004 HC RX CONTRAST MEDICATION: Performed by: STUDENT IN AN ORGANIZED HEALTH CARE EDUCATION/TRAINING PROGRAM

## 2023-04-18 PROCEDURE — 83605 ASSAY OF LACTIC ACID: CPT

## 2023-04-18 PROCEDURE — 96374 THER/PROPH/DIAG INJ IV PUSH: CPT

## 2023-04-18 RX ORDER — ONDANSETRON 2 MG/ML
4 INJECTION INTRAMUSCULAR; INTRAVENOUS ONCE
Status: COMPLETED | OUTPATIENT
Start: 2023-04-18 | End: 2023-04-18

## 2023-04-18 RX ORDER — KETOROLAC TROMETHAMINE 30 MG/ML
15 INJECTION, SOLUTION INTRAMUSCULAR; INTRAVENOUS ONCE
Status: COMPLETED | OUTPATIENT
Start: 2023-04-18 | End: 2023-04-18

## 2023-04-18 RX ORDER — SODIUM CHLORIDE 9 MG/ML
1000 INJECTION, SOLUTION INTRAVENOUS CONTINUOUS
Status: DISCONTINUED | OUTPATIENT
Start: 2023-04-18 | End: 2023-04-19 | Stop reason: SDUPTHER

## 2023-04-18 RX ADMIN — HYDROMORPHONE HYDROCHLORIDE 0.5 MG: 1 INJECTION, SOLUTION INTRAMUSCULAR; INTRAVENOUS; SUBCUTANEOUS at 23:28

## 2023-04-18 RX ADMIN — KETOROLAC TROMETHAMINE 15 MG: 30 INJECTION, SOLUTION INTRAMUSCULAR; INTRAVENOUS at 14:14

## 2023-04-18 RX ADMIN — IOPAMIDOL 75 ML: 755 INJECTION, SOLUTION INTRAVENOUS at 21:44

## 2023-04-18 RX ADMIN — HYDROMORPHONE HYDROCHLORIDE 0.5 MG: 1 INJECTION, SOLUTION INTRAMUSCULAR; INTRAVENOUS; SUBCUTANEOUS at 15:54

## 2023-04-18 RX ADMIN — KETOROLAC TROMETHAMINE 15 MG: 30 INJECTION, SOLUTION INTRAMUSCULAR; INTRAVENOUS at 20:40

## 2023-04-18 RX ADMIN — HYDROMORPHONE HYDROCHLORIDE 0.5 MG: 1 INJECTION, SOLUTION INTRAMUSCULAR; INTRAVENOUS; SUBCUTANEOUS at 19:28

## 2023-04-18 RX ADMIN — HYDROMORPHONE HYDROCHLORIDE 0.25 MG: 1 INJECTION, SOLUTION INTRAMUSCULAR; INTRAVENOUS; SUBCUTANEOUS at 17:41

## 2023-04-18 RX ADMIN — SODIUM CHLORIDE 1000 ML: 9 INJECTION, SOLUTION INTRAVENOUS at 15:49

## 2023-04-18 RX ADMIN — HYDROMORPHONE HYDROCHLORIDE 0.5 MG: 1 INJECTION, SOLUTION INTRAMUSCULAR; INTRAVENOUS; SUBCUTANEOUS at 14:14

## 2023-04-18 RX ADMIN — ONDANSETRON 4 MG: 2 INJECTION INTRAMUSCULAR; INTRAVENOUS at 14:14

## 2023-04-18 ASSESSMENT — PAIN DESCRIPTION - DESCRIPTORS
DESCRIPTORS: SHARP;ACHING
DESCRIPTORS: ACHING;SHARP
DESCRIPTORS: ACHING;SHARP
DESCRIPTORS: SHARP;ACHING
DESCRIPTORS: ACHING;SHARP

## 2023-04-18 ASSESSMENT — ENCOUNTER SYMPTOMS
ABDOMINAL PAIN: 0
VOMITING: 0
EYE DISCHARGE: 0
SHORTNESS OF BREATH: 0
COLOR CHANGE: 0
FACIAL SWELLING: 0
BACK PAIN: 1
CHOKING: 0
APNEA: 0
EYE REDNESS: 0
NAUSEA: 0

## 2023-04-18 ASSESSMENT — PAIN SCALES - GENERAL
PAINLEVEL_OUTOF10: 8
PAINLEVEL_OUTOF10: 7
PAINLEVEL_OUTOF10: 8
PAINLEVEL_OUTOF10: 8
PAINLEVEL_OUTOF10: 10
PAINLEVEL_OUTOF10: 8

## 2023-04-18 ASSESSMENT — PAIN DESCRIPTION - LOCATION
LOCATION: BACK;CHEST
LOCATION: BACK;CHEST
LOCATION: BACK
LOCATION: BACK;CHEST
LOCATION: BACK;CHEST

## 2023-04-18 ASSESSMENT — PAIN - FUNCTIONAL ASSESSMENT: PAIN_FUNCTIONAL_ASSESSMENT: 0-10

## 2023-04-19 LAB
ABO + RH BLD: NORMAL
ANION GAP SERPL CALCULATED.3IONS-SCNC: 10 MMOL/L (ref 3–16)
ANISOCYTOSIS BLD QL SMEAR: ABNORMAL
BASOPHILS # BLD: 0 K/UL (ref 0–0.2)
BASOPHILS NFR BLD: 0 %
BLD GP AB SCN SERPL QL: NORMAL
BUN SERPL-MCNC: 7 MG/DL (ref 7–20)
CALCIUM SERPL-MCNC: 8.9 MG/DL (ref 8.3–10.6)
CHLORIDE SERPL-SCNC: 106 MMOL/L (ref 99–110)
CO2 SERPL-SCNC: 25 MMOL/L (ref 21–32)
CREAT SERPL-MCNC: 0.6 MG/DL (ref 0.9–1.3)
DEPRECATED RDW RBC AUTO: 23.1 % (ref 12.4–15.4)
EOSINOPHIL # BLD: 0.3 K/UL (ref 0–0.6)
EOSINOPHIL NFR BLD: 2 %
GFR SERPLBLD CREATININE-BSD FMLA CKD-EPI: >60 ML/MIN/{1.73_M2}
GLUCOSE SERPL-MCNC: 103 MG/DL (ref 70–99)
HCT VFR BLD AUTO: 19.7 % (ref 40.5–52.5)
HGB BLD-MCNC: 7 G/DL (ref 13.5–17.5)
HYPOCHROMIA BLD QL SMEAR: ABNORMAL
LYMPHOCYTES # BLD: 2 K/UL (ref 1–5.1)
LYMPHOCYTES NFR BLD: 13 %
MCH RBC QN AUTO: 34.9 PG (ref 26–34)
MCHC RBC AUTO-ENTMCNC: 35.4 G/DL (ref 31–36)
MCV RBC AUTO: 98.6 FL (ref 80–100)
MONOCYTES # BLD: 1.1 K/UL (ref 0–1.3)
MONOCYTES NFR BLD: 7 %
NEUTROPHILS # BLD: 11.9 K/UL (ref 1.7–7.7)
NEUTROPHILS NFR BLD: 74 %
NEUTS BAND NFR BLD MANUAL: 4 % (ref 0–7)
OVALOCYTES BLD QL SMEAR: ABNORMAL
PATH INTERP BLD-IMP: NO
PATH INTERP BLD-IMP: NORMAL
PLATELET # BLD AUTO: 343 K/UL (ref 135–450)
PLATELET BLD QL SMEAR: ADEQUATE
PMV BLD AUTO: 8.9 FL (ref 5–10.5)
POIKILOCYTOSIS BLD QL SMEAR: ABNORMAL
POLYCHROMASIA BLD QL SMEAR: ABNORMAL
POTASSIUM SERPL-SCNC: 4.3 MMOL/L (ref 3.5–5.1)
RBC # BLD AUTO: 1.99 M/UL (ref 4.2–5.9)
SICKLE CELLS BLD QL SMEAR: ABNORMAL
SLIDE REVIEW: ABNORMAL
SODIUM SERPL-SCNC: 141 MMOL/L (ref 136–145)
TARGETS BLD QL SMEAR: ABNORMAL
WBC # BLD AUTO: 15.3 K/UL (ref 4–11)

## 2023-04-19 PROCEDURE — 86900 BLOOD TYPING SEROLOGIC ABO: CPT

## 2023-04-19 PROCEDURE — 36415 COLL VENOUS BLD VENIPUNCTURE: CPT

## 2023-04-19 PROCEDURE — 80048 BASIC METABOLIC PNL TOTAL CA: CPT

## 2023-04-19 PROCEDURE — 86850 RBC ANTIBODY SCREEN: CPT

## 2023-04-19 PROCEDURE — 6360000002 HC RX W HCPCS: Performed by: INTERNAL MEDICINE

## 2023-04-19 PROCEDURE — 85025 COMPLETE CBC W/AUTO DIFF WBC: CPT

## 2023-04-19 PROCEDURE — 6370000000 HC RX 637 (ALT 250 FOR IP): Performed by: INTERNAL MEDICINE

## 2023-04-19 PROCEDURE — 1200000000 HC SEMI PRIVATE

## 2023-04-19 PROCEDURE — P9016 RBC LEUKOCYTES REDUCED: HCPCS

## 2023-04-19 PROCEDURE — 2580000003 HC RX 258: Performed by: INTERNAL MEDICINE

## 2023-04-19 PROCEDURE — 99232 SBSQ HOSP IP/OBS MODERATE 35: CPT | Performed by: INTERNAL MEDICINE

## 2023-04-19 PROCEDURE — 36430 TRANSFUSION BLD/BLD COMPNT: CPT

## 2023-04-19 PROCEDURE — 86901 BLOOD TYPING SEROLOGIC RH(D): CPT

## 2023-04-19 PROCEDURE — 86923 COMPATIBILITY TEST ELECTRIC: CPT

## 2023-04-19 RX ORDER — GABAPENTIN 100 MG/1
100 CAPSULE ORAL EVERY 8 HOURS SCHEDULED
Status: DISCONTINUED | OUTPATIENT
Start: 2023-04-19 | End: 2023-04-20 | Stop reason: HOSPADM

## 2023-04-19 RX ORDER — POLYETHYLENE GLYCOL 3350 17 G/17G
17 POWDER, FOR SOLUTION ORAL DAILY PRN
Status: DISCONTINUED | OUTPATIENT
Start: 2023-04-19 | End: 2023-04-20 | Stop reason: HOSPADM

## 2023-04-19 RX ORDER — POTASSIUM CHLORIDE 7.45 MG/ML
10 INJECTION INTRAVENOUS PRN
Status: DISCONTINUED | OUTPATIENT
Start: 2023-04-19 | End: 2023-04-20 | Stop reason: HOSPADM

## 2023-04-19 RX ORDER — SODIUM CHLORIDE 9 MG/ML
INJECTION, SOLUTION INTRAVENOUS CONTINUOUS
Status: DISCONTINUED | OUTPATIENT
Start: 2023-04-19 | End: 2023-04-20 | Stop reason: HOSPADM

## 2023-04-19 RX ORDER — ONDANSETRON 2 MG/ML
4 INJECTION INTRAMUSCULAR; INTRAVENOUS EVERY 6 HOURS PRN
Status: DISCONTINUED | OUTPATIENT
Start: 2023-04-19 | End: 2023-04-20 | Stop reason: HOSPADM

## 2023-04-19 RX ORDER — ONDANSETRON 4 MG/1
4 TABLET, ORALLY DISINTEGRATING ORAL EVERY 8 HOURS PRN
Status: DISCONTINUED | OUTPATIENT
Start: 2023-04-19 | End: 2023-04-20 | Stop reason: HOSPADM

## 2023-04-19 RX ORDER — SODIUM CHLORIDE 9 MG/ML
INJECTION, SOLUTION INTRAVENOUS PRN
Status: DISCONTINUED | OUTPATIENT
Start: 2023-04-19 | End: 2023-04-20 | Stop reason: HOSPADM

## 2023-04-19 RX ORDER — MAGNESIUM SULFATE IN WATER 40 MG/ML
2000 INJECTION, SOLUTION INTRAVENOUS PRN
Status: DISCONTINUED | OUTPATIENT
Start: 2023-04-19 | End: 2023-04-20 | Stop reason: HOSPADM

## 2023-04-19 RX ORDER — POTASSIUM CHLORIDE 20 MEQ/1
40 TABLET, EXTENDED RELEASE ORAL PRN
Status: DISCONTINUED | OUTPATIENT
Start: 2023-04-19 | End: 2023-04-20 | Stop reason: HOSPADM

## 2023-04-19 RX ORDER — SODIUM CHLORIDE 0.9 % (FLUSH) 0.9 %
5-40 SYRINGE (ML) INJECTION EVERY 12 HOURS SCHEDULED
Status: DISCONTINUED | OUTPATIENT
Start: 2023-04-19 | End: 2023-04-20 | Stop reason: HOSPADM

## 2023-04-19 RX ORDER — ACETAMINOPHEN 650 MG/1
650 SUPPOSITORY RECTAL EVERY 6 HOURS PRN
Status: DISCONTINUED | OUTPATIENT
Start: 2023-04-19 | End: 2023-04-20 | Stop reason: HOSPADM

## 2023-04-19 RX ORDER — OXYCODONE HYDROCHLORIDE 5 MG/1
10 TABLET ORAL EVERY 6 HOURS PRN
Status: DISCONTINUED | OUTPATIENT
Start: 2023-04-19 | End: 2023-04-20 | Stop reason: DRUGHIGH

## 2023-04-19 RX ORDER — SODIUM CHLORIDE 0.9 % (FLUSH) 0.9 %
5-40 SYRINGE (ML) INJECTION PRN
Status: DISCONTINUED | OUTPATIENT
Start: 2023-04-19 | End: 2023-04-20 | Stop reason: HOSPADM

## 2023-04-19 RX ORDER — HYDROXYUREA 500 MG/1
1000 CAPSULE ORAL 2 TIMES DAILY
Status: DISCONTINUED | OUTPATIENT
Start: 2023-04-19 | End: 2023-04-20 | Stop reason: HOSPADM

## 2023-04-19 RX ORDER — ACETAMINOPHEN 325 MG/1
650 TABLET ORAL EVERY 6 HOURS PRN
Status: DISCONTINUED | OUTPATIENT
Start: 2023-04-19 | End: 2023-04-20 | Stop reason: HOSPADM

## 2023-04-19 RX ADMIN — SODIUM CHLORIDE: 9 INJECTION, SOLUTION INTRAVENOUS at 00:32

## 2023-04-19 RX ADMIN — GABAPENTIN 100 MG: 100 CAPSULE ORAL at 14:17

## 2023-04-19 RX ADMIN — SODIUM CHLORIDE: 9 INJECTION, SOLUTION INTRAVENOUS at 14:16

## 2023-04-19 RX ADMIN — HYDROMORPHONE HYDROCHLORIDE 1 MG: 1 INJECTION, SOLUTION INTRAMUSCULAR; INTRAVENOUS; SUBCUTANEOUS at 11:02

## 2023-04-19 RX ADMIN — HYDROMORPHONE HYDROCHLORIDE 1 MG: 1 INJECTION, SOLUTION INTRAMUSCULAR; INTRAVENOUS; SUBCUTANEOUS at 04:11

## 2023-04-19 RX ADMIN — OXYCODONE HYDROCHLORIDE 20 MG: 15 TABLET ORAL at 05:50

## 2023-04-19 RX ADMIN — OXYCODONE 10 MG: 5 TABLET ORAL at 14:18

## 2023-04-19 RX ADMIN — HYDROXYUREA 1000 MG: 500 CAPSULE ORAL at 01:34

## 2023-04-19 RX ADMIN — HYDROXYUREA 1000 MG: 500 CAPSULE ORAL at 21:06

## 2023-04-19 RX ADMIN — GABAPENTIN 100 MG: 100 CAPSULE ORAL at 21:04

## 2023-04-19 RX ADMIN — ACETAMINOPHEN 650 MG: 325 TABLET ORAL at 23:01

## 2023-04-19 RX ADMIN — HYDROXYUREA 1000 MG: 500 CAPSULE ORAL at 09:33

## 2023-04-19 RX ADMIN — OXYCODONE 10 MG: 5 TABLET ORAL at 21:04

## 2023-04-19 RX ADMIN — Medication 10 ML: at 09:34

## 2023-04-19 RX ADMIN — HYDROMORPHONE HYDROCHLORIDE 0.5 MG: 1 INJECTION, SOLUTION INTRAMUSCULAR; INTRAVENOUS; SUBCUTANEOUS at 15:33

## 2023-04-19 RX ADMIN — OXYCODONE HYDROCHLORIDE 20 MG: 15 TABLET ORAL at 09:51

## 2023-04-19 RX ADMIN — HYDROMORPHONE HYDROCHLORIDE 0.5 MG: 1 INJECTION, SOLUTION INTRAMUSCULAR; INTRAVENOUS; SUBCUTANEOUS at 19:55

## 2023-04-19 RX ADMIN — HYDROMORPHONE HYDROCHLORIDE 1 MG: 1 INJECTION, SOLUTION INTRAMUSCULAR; INTRAVENOUS; SUBCUTANEOUS at 07:29

## 2023-04-19 RX ADMIN — OXYCODONE HYDROCHLORIDE 20 MG: 15 TABLET ORAL at 00:54

## 2023-04-19 RX ADMIN — GABAPENTIN 100 MG: 100 CAPSULE ORAL at 05:52

## 2023-04-19 ASSESSMENT — PAIN DESCRIPTION - DESCRIPTORS
DESCRIPTORS: ACHING;SHARP
DESCRIPTORS: ACHING;SHARP
DESCRIPTORS: ACHING
DESCRIPTORS: ACHING
DESCRIPTORS: ACHING;SHARP
DESCRIPTORS: ACHING;SHARP
DESCRIPTORS: ACHING
DESCRIPTORS: SHARP;ACHING
DESCRIPTORS: ACHING;SHARP
DESCRIPTORS: ACHING

## 2023-04-19 ASSESSMENT — PAIN SCALES - GENERAL
PAINLEVEL_OUTOF10: 8
PAINLEVEL_OUTOF10: 7
PAINLEVEL_OUTOF10: 8
PAINLEVEL_OUTOF10: 9
PAINLEVEL_OUTOF10: 7
PAINLEVEL_OUTOF10: 9

## 2023-04-19 ASSESSMENT — PAIN DESCRIPTION - LOCATION
LOCATION: ABDOMEN;BACK
LOCATION: BACK
LOCATION: BACK;CHEST
LOCATION: BACK;GROIN
LOCATION: BACK
LOCATION: BACK
LOCATION: ABDOMEN;BACK
LOCATION: GROIN
LOCATION: ABDOMEN;BACK
LOCATION: BACK

## 2023-04-19 ASSESSMENT — PAIN DESCRIPTION - ORIENTATION
ORIENTATION: LOWER
ORIENTATION: LOWER
ORIENTATION: LOWER;MID
ORIENTATION: LEFT;RIGHT;LOWER
ORIENTATION: MID;LOWER
ORIENTATION: LOWER
ORIENTATION: LOWER
ORIENTATION: MID;LOWER
ORIENTATION: MID;LOWER

## 2023-04-19 NOTE — H&P
continue Hydrea  As needed Dilaudid  Resume Eliquis, needs medication assistance      DVT Prophylaxis: Eliquis  Diet: ADULT DIET; Regular  Code Status: Full Code    Dispo -admit to [facility] on telemetry      Thank you for the opportunity to be involved in this patient's care.       (Please note that portions of this note were completed with a voice recognition program. Efforts were made to edit the dictations but occasionally words are mis-transcribed.)

## 2023-04-19 NOTE — DISCHARGE INSTRUCTIONS
[facility] find a Physician line [phone]. Please call them to find a physician. They accept patients that have insurance.

## 2023-04-19 NOTE — CONSULTS
Discussed with hospitalist, erection resolved, urology c/s has been cancelled  Please call if we can be of assistance    Jasen Acuña PA-C  [facility]

## 2023-04-19 NOTE — ACP (ADVANCE CARE PLANNING)
Advance Care Planning     General Advance Care Planning (ACP) Conversation    Date of Conversation: 4/18/2023  Conducted with: Patient with Decision Making Capacity    Healthcare Decision Maker:    Primary Decision Maker: Berkley Javed - [facility] - [phone]  Click here to complete Healthcare Decision Makers including selection of the Healthcare Decision Maker Relationship (ie \"Primary\"). Today we documented Decision Maker(s) consistent with Legal Next of Kin hierarchy.     Content/Action Overview:    Reviewed DNR/DNI and patient elects Full Code (Attempt Resuscitation)    Length of Voluntary ACP Conversation in minutes:  <16 minutes (Non-Billable)    [facility] BC ROWE  Case Management Department  Phone: [phone] Fax: [phone]

## 2023-04-19 NOTE — CARE COORDINATION
Case Management Assessment  Initial Evaluation    Date/Time of Evaluation: 4/19/2023 11:29 AM  Assessment Completed by: BC Julian  Case Management Department  Phone: [phone] Fax: [phone]      If patient is discharged prior to next notation, then this note serves as note for discharge by case management. [facility]                   YOB: 2000  Diagnosis: Sickle cell crisis (HCC) [D57.00]  Leukocytosis, unspecified type [D72.829]  Chest pain, unspecified type [R07.9]                   Date / Time: 4/18/2023  1:44 PM    Patient Admission Status: Inpatient   Readmission Risk (Low < 19, Mod (19-27), High > 27): Readmission Risk Score: 31.5    Current PCP: Hitesh Galaviz MD  PCP verified by CM? Yes (pt stated he doesnt have a PCP; he requested only the number for the care clinic; information placed on AVS and finding MD)    Chart Reviewed: Yes      History Provided by: Patient  Patient Orientation: Alert and Oriented    Patient Cognition: Alert    Hospitalization in the last 30 days (Readmission):  No    If yes, Readmission Assessment in CM Navigator will be completed. Advance Directives:      Code Status: Full Code   Patient's Primary Decision Maker is: Legal Next of Kin    Primary Decision Maker: [place] Cardoso 56 - Parent - [phone]    Discharge Planning:    Patient lives with: Parent Type of Home: House  Primary Care Giver: Self  Patient Support Systems include: Parent   Current Financial resources: Other (Comment) ([employer] and [employer])  Current community resources: None  Current services prior to admission: None            Current DME:              Type of Home Care services:  None    ADLS  Prior functional level: Independent in ADLs/IADLs  Current functional level: Independent in ADLs/IADLs    PT AM-PAC:   /24  OT AM-PAC:   /24    Family can provide assistance at DC:  Yes  Would you like Case Management to discuss the discharge plan with any

## 2023-04-20 ENCOUNTER — HOSPITAL ENCOUNTER (INPATIENT)
Age: 23
LOS: 10 days | Discharge: HOME | DRG: 812 | End: 2023-04-30
Attending: INTERNAL MEDICINE | Admitting: INTERNAL MEDICINE
Payer: COMMERCIAL

## 2023-04-20 ENCOUNTER — ANESTHESIA (OUTPATIENT)
Dept: OPERATING ROOM | Age: 23
End: 2023-04-20
Payer: COMMERCIAL

## 2023-04-20 ENCOUNTER — ANESTHESIA EVENT (OUTPATIENT)
Dept: OPERATING ROOM | Age: 23
End: 2023-04-20
Payer: COMMERCIAL

## 2023-04-20 VITALS
WEIGHT: 194.9 LBS | BODY MASS INDEX: 28.87 KG/M2 | TEMPERATURE: 99.2 F | HEIGHT: 69 IN | RESPIRATION RATE: 17 BRPM | SYSTOLIC BLOOD PRESSURE: 146 MMHG | OXYGEN SATURATION: 92 % | DIASTOLIC BLOOD PRESSURE: 72 MMHG | HEART RATE: 96 BPM

## 2023-04-20 DIAGNOSIS — D57.00 ACUTE SICKLE CELL CRISIS (HCC): Primary | ICD-10-CM

## 2023-04-20 DIAGNOSIS — N48.30 PRIAPISM: ICD-10-CM

## 2023-04-20 LAB
BASOPHILS # BLD: 0.1 K/UL (ref 0–0.2)
BASOPHILS NFR BLD: 1.2 %
DEPRECATED RDW RBC AUTO: 22.1 % (ref 12.4–15.4)
EOSINOPHIL # BLD: 0.3 K/UL (ref 0–0.6)
EOSINOPHIL NFR BLD: 2.8 %
HCT VFR BLD AUTO: 22.7 % (ref 40.5–52.5)
HGB BLD-MCNC: 8.2 G/DL (ref 13.5–17.5)
LYMPHOCYTES # BLD: 3.3 K/UL (ref 1–5.1)
LYMPHOCYTES NFR BLD: 28.5 %
MCH RBC QN AUTO: 35.4 PG (ref 26–34)
MCHC RBC AUTO-ENTMCNC: 36.3 G/DL (ref 31–36)
MCV RBC AUTO: 97.7 FL (ref 80–100)
MONOCYTES # BLD: 1.2 K/UL (ref 0–1.3)
MONOCYTES NFR BLD: 9.9 %
NEUTROPHILS # BLD: 6.7 K/UL (ref 1.7–7.7)
NEUTROPHILS NFR BLD: 57.6 %
PLATELET # BLD AUTO: 347 K/UL (ref 135–450)
PMV BLD AUTO: 8.5 FL (ref 5–10.5)
RBC # BLD AUTO: 2.32 M/UL (ref 4.2–5.9)
WBC # BLD AUTO: 11.7 K/UL (ref 4–11)

## 2023-04-20 PROCEDURE — 2580000003 HC RX 258: Performed by: UROLOGY

## 2023-04-20 PROCEDURE — 6360000002 HC RX W HCPCS: Performed by: ANESTHESIOLOGY

## 2023-04-20 PROCEDURE — 3600000013 HC SURGERY LEVEL 3 ADDTL 15MIN: Performed by: UROLOGY

## 2023-04-20 PROCEDURE — 3600000003 HC SURGERY LEVEL 3 BASE: Performed by: UROLOGY

## 2023-04-20 PROCEDURE — 2580000003 HC RX 258: Performed by: PHYSICIAN ASSISTANT

## 2023-04-20 PROCEDURE — 36415 COLL VENOUS BLD VENIPUNCTURE: CPT

## 2023-04-20 PROCEDURE — 6360000002 HC RX W HCPCS: Performed by: INTERNAL MEDICINE

## 2023-04-20 PROCEDURE — 3700000001 HC ADD 15 MINUTES (ANESTHESIA): Performed by: UROLOGY

## 2023-04-20 PROCEDURE — 7100000000 HC PACU RECOVERY - FIRST 15 MIN: Performed by: UROLOGY

## 2023-04-20 PROCEDURE — 85025 COMPLETE CBC W/AUTO DIFF WBC: CPT

## 2023-04-20 PROCEDURE — 1200000000 HC SEMI PRIVATE

## 2023-04-20 PROCEDURE — 2500000003 HC RX 250 WO HCPCS: Performed by: PHYSICIAN ASSISTANT

## 2023-04-20 PROCEDURE — 2580000003 HC RX 258: Performed by: INTERNAL MEDICINE

## 2023-04-20 PROCEDURE — 6360000002 HC RX W HCPCS: Performed by: PHYSICIAN ASSISTANT

## 2023-04-20 PROCEDURE — 2500000003 HC RX 250 WO HCPCS: Performed by: ANESTHESIOLOGY

## 2023-04-20 PROCEDURE — 3700000000 HC ANESTHESIA ATTENDED CARE: Performed by: UROLOGY

## 2023-04-20 PROCEDURE — 7100000001 HC PACU RECOVERY - ADDTL 15 MIN: Performed by: UROLOGY

## 2023-04-20 PROCEDURE — 2500000003 HC RX 250 WO HCPCS: Performed by: UROLOGY

## 2023-04-20 PROCEDURE — 6360000002 HC RX W HCPCS

## 2023-04-20 PROCEDURE — 2709999900 HC NON-CHARGEABLE SUPPLY: Performed by: UROLOGY

## 2023-04-20 PROCEDURE — 0V9S3ZZ DRAINAGE OF PENIS, PERCUTANEOUS APPROACH: ICD-10-PCS | Performed by: UROLOGY

## 2023-04-20 PROCEDURE — 6370000000 HC RX 637 (ALT 250 FOR IP): Performed by: INTERNAL MEDICINE

## 2023-04-20 PROCEDURE — 99238 HOSP IP/OBS DSCHRG MGMT 30/<: CPT | Performed by: INTERNAL MEDICINE

## 2023-04-20 PROCEDURE — 6360000002 HC RX W HCPCS: Performed by: UROLOGY

## 2023-04-20 RX ORDER — SODIUM CHLORIDE 9 MG/ML
25 INJECTION, SOLUTION INTRAVENOUS PRN
Status: DISCONTINUED | OUTPATIENT
Start: 2023-04-20 | End: 2023-04-21 | Stop reason: HOSPADM

## 2023-04-20 RX ORDER — LIDOCAINE HYDROCHLORIDE 10 MG/ML
1 INJECTION, SOLUTION EPIDURAL; INFILTRATION; INTRACAUDAL; PERINEURAL ONCE
Status: COMPLETED | OUTPATIENT
Start: 2023-04-20 | End: 2023-04-20

## 2023-04-20 RX ORDER — LIDOCAINE HYDROCHLORIDE 20 MG/ML
INJECTION, SOLUTION INFILTRATION; PERINEURAL PRN
Status: DISCONTINUED | OUTPATIENT
Start: 2023-04-20 | End: 2023-04-20 | Stop reason: SDUPTHER

## 2023-04-20 RX ORDER — PROPOFOL 10 MG/ML
INJECTION, EMULSION INTRAVENOUS PRN
Status: DISCONTINUED | OUTPATIENT
Start: 2023-04-20 | End: 2023-04-20 | Stop reason: SDUPTHER

## 2023-04-20 RX ORDER — MEPERIDINE HYDROCHLORIDE 50 MG/ML
12.5 INJECTION INTRAMUSCULAR; INTRAVENOUS; SUBCUTANEOUS EVERY 5 MIN PRN
Status: DISCONTINUED | OUTPATIENT
Start: 2023-04-20 | End: 2023-04-21 | Stop reason: HOSPADM

## 2023-04-20 RX ORDER — PIPERACILLIN SODIUM, TAZOBACTAM SODIUM 3; .375 G/15ML; G/15ML
INJECTION, POWDER, LYOPHILIZED, FOR SOLUTION INTRAVENOUS
Status: DISPENSED
Start: 2023-04-20 | End: 2023-04-21

## 2023-04-20 RX ORDER — ONDANSETRON 2 MG/ML
INJECTION INTRAMUSCULAR; INTRAVENOUS PRN
Status: DISCONTINUED | OUTPATIENT
Start: 2023-04-20 | End: 2023-04-20 | Stop reason: SDUPTHER

## 2023-04-20 RX ORDER — SODIUM CHLORIDE 0.9 % (FLUSH) 0.9 %
5-40 SYRINGE (ML) INJECTION EVERY 12 HOURS SCHEDULED
Status: DISCONTINUED | OUTPATIENT
Start: 2023-04-21 | End: 2023-04-21 | Stop reason: HOSPADM

## 2023-04-20 RX ORDER — OXYCODONE HYDROCHLORIDE 5 MG/1
10 TABLET ORAL
Status: DISCONTINUED | OUTPATIENT
Start: 2023-04-20 | End: 2023-04-20 | Stop reason: HOSPADM

## 2023-04-20 RX ORDER — OXYCODONE HYDROCHLORIDE 5 MG/1
5 TABLET ORAL
Status: DISCONTINUED | OUTPATIENT
Start: 2023-04-20 | End: 2023-04-21 | Stop reason: HOSPADM

## 2023-04-20 RX ORDER — ONDANSETRON 2 MG/ML
4 INJECTION INTRAMUSCULAR; INTRAVENOUS EVERY 10 MIN PRN
Status: DISCONTINUED | OUTPATIENT
Start: 2023-04-20 | End: 2023-04-21 | Stop reason: HOSPADM

## 2023-04-20 RX ORDER — ROCURONIUM BROMIDE 10 MG/ML
INJECTION, SOLUTION INTRAVENOUS PRN
Status: DISCONTINUED | OUTPATIENT
Start: 2023-04-20 | End: 2023-04-20 | Stop reason: SDUPTHER

## 2023-04-20 RX ORDER — MIDAZOLAM HYDROCHLORIDE 1 MG/ML
1 INJECTION INTRAMUSCULAR; INTRAVENOUS EVERY 5 MIN PRN
Status: DISCONTINUED | OUTPATIENT
Start: 2023-04-20 | End: 2023-04-21 | Stop reason: HOSPADM

## 2023-04-20 RX ORDER — HYDRALAZINE HYDROCHLORIDE 20 MG/ML
5 INJECTION INTRAMUSCULAR; INTRAVENOUS
Status: DISCONTINUED | OUTPATIENT
Start: 2023-04-20 | End: 2023-04-21 | Stop reason: HOSPADM

## 2023-04-20 RX ORDER — BUPIVACAINE HYDROCHLORIDE 5 MG/ML
INJECTION, SOLUTION EPIDURAL; INTRACAUDAL PRN
Status: DISCONTINUED | OUTPATIENT
Start: 2023-04-20 | End: 2023-04-20 | Stop reason: ALTCHOICE

## 2023-04-20 RX ORDER — DIPHENHYDRAMINE HYDROCHLORIDE 50 MG/ML
12.5 INJECTION INTRAMUSCULAR; INTRAVENOUS
Status: DISCONTINUED | OUTPATIENT
Start: 2023-04-20 | End: 2023-04-21 | Stop reason: HOSPADM

## 2023-04-20 RX ORDER — MIDAZOLAM HYDROCHLORIDE 1 MG/ML
INJECTION INTRAMUSCULAR; INTRAVENOUS PRN
Status: DISCONTINUED | OUTPATIENT
Start: 2023-04-20 | End: 2023-04-20 | Stop reason: SDUPTHER

## 2023-04-20 RX ORDER — SODIUM CHLORIDE 0.9 % (FLUSH) 0.9 %
5-40 SYRINGE (ML) INJECTION PRN
Status: DISCONTINUED | OUTPATIENT
Start: 2023-04-20 | End: 2023-04-21 | Stop reason: HOSPADM

## 2023-04-20 RX ADMIN — HYDROMORPHONE HYDROCHLORIDE 0.5 MG: 1 INJECTION, SOLUTION INTRAMUSCULAR; INTRAVENOUS; SUBCUTANEOUS at 23:48

## 2023-04-20 RX ADMIN — MIDAZOLAM HYDROCHLORIDE 2 MG: 2 INJECTION, SOLUTION INTRAMUSCULAR; INTRAVENOUS at 22:29

## 2023-04-20 RX ADMIN — LIDOCAINE HYDROCHLORIDE 60 MG: 20 INJECTION, SOLUTION INFILTRATION; PERINEURAL at 22:33

## 2023-04-20 RX ADMIN — HYDROMORPHONE HYDROCHLORIDE 0.5 MG: 1 INJECTION, SOLUTION INTRAMUSCULAR; INTRAVENOUS; SUBCUTANEOUS at 21:51

## 2023-04-20 RX ADMIN — HYDROMORPHONE HYDROCHLORIDE 0.5 MG: 1 INJECTION, SOLUTION INTRAMUSCULAR; INTRAVENOUS; SUBCUTANEOUS at 00:08

## 2023-04-20 RX ADMIN — PROPOFOL 170 MG: 10 INJECTION, EMULSION INTRAVENOUS at 22:33

## 2023-04-20 RX ADMIN — GABAPENTIN 100 MG: 100 CAPSULE ORAL at 06:09

## 2023-04-20 RX ADMIN — HYDROMORPHONE HYDROCHLORIDE 0.5 MG: 1 INJECTION, SOLUTION INTRAMUSCULAR; INTRAVENOUS; SUBCUTANEOUS at 21:45

## 2023-04-20 RX ADMIN — HYDROXYUREA 1000 MG: 500 CAPSULE ORAL at 20:16

## 2023-04-20 RX ADMIN — OXYCODONE 10 MG: 5 TABLET ORAL at 18:58

## 2023-04-20 RX ADMIN — HYDROMORPHONE HYDROCHLORIDE 1 MG: 1 INJECTION, SOLUTION INTRAMUSCULAR; INTRAVENOUS; SUBCUTANEOUS at 17:12

## 2023-04-20 RX ADMIN — SUGAMMADEX 200 MG: 100 INJECTION, SOLUTION INTRAVENOUS at 23:36

## 2023-04-20 RX ADMIN — HYDROMORPHONE HYDROCHLORIDE 0.5 MG: 1 INJECTION, SOLUTION INTRAMUSCULAR; INTRAVENOUS; SUBCUTANEOUS at 23:57

## 2023-04-20 RX ADMIN — GENTAMICIN SULFATE 120 MG: 40 INJECTION, SOLUTION INTRAMUSCULAR; INTRAVENOUS at 22:47

## 2023-04-20 RX ADMIN — HYDROMORPHONE HYDROCHLORIDE 0.5 MG: 1 INJECTION, SOLUTION INTRAMUSCULAR; INTRAVENOUS; SUBCUTANEOUS at 04:20

## 2023-04-20 RX ADMIN — PHENYLEPHRINE HYDROCHLORIDE 0.5 MG: 10 INJECTION INTRAVENOUS at 12:03

## 2023-04-20 RX ADMIN — HYDROMORPHONE HYDROCHLORIDE 1 MG: 1 INJECTION, SOLUTION INTRAMUSCULAR; INTRAVENOUS; SUBCUTANEOUS at 14:21

## 2023-04-20 RX ADMIN — SODIUM CHLORIDE: 9 INJECTION, SOLUTION INTRAVENOUS at 20:09

## 2023-04-20 RX ADMIN — OXYCODONE 10 MG: 5 TABLET ORAL at 06:09

## 2023-04-20 RX ADMIN — OXYCODONE 10 MG: 5 TABLET ORAL at 01:17

## 2023-04-20 RX ADMIN — HYDROXYUREA 1000 MG: 500 CAPSULE ORAL at 08:09

## 2023-04-20 RX ADMIN — GABAPENTIN 100 MG: 100 CAPSULE ORAL at 20:17

## 2023-04-20 RX ADMIN — ROCURONIUM BROMIDE 15 MG: 10 SOLUTION INTRAVENOUS at 23:13

## 2023-04-20 RX ADMIN — Medication 10 ML: at 08:09

## 2023-04-20 RX ADMIN — HYDROMORPHONE HYDROCHLORIDE 1 MG: 1 INJECTION, SOLUTION INTRAMUSCULAR; INTRAVENOUS; SUBCUTANEOUS at 20:13

## 2023-04-20 RX ADMIN — OXYCODONE 10 MG: 5 TABLET ORAL at 16:07

## 2023-04-20 RX ADMIN — SODIUM CHLORIDE: 9 INJECTION, SOLUTION INTRAVENOUS at 00:13

## 2023-04-20 RX ADMIN — HYDROMORPHONE HYDROCHLORIDE 0.5 MG: 1 INJECTION, SOLUTION INTRAMUSCULAR; INTRAVENOUS; SUBCUTANEOUS at 08:07

## 2023-04-20 RX ADMIN — LIDOCAINE HYDROCHLORIDE 1 ML: 20 INJECTION, SOLUTION EPIDURAL; INFILTRATION; INTRACAUDAL; PERINEURAL at 12:02

## 2023-04-20 RX ADMIN — ROCURONIUM BROMIDE 35 MG: 10 SOLUTION INTRAVENOUS at 22:34

## 2023-04-20 RX ADMIN — ONDANSETRON 4 MG: 2 INJECTION INTRAMUSCULAR; INTRAVENOUS at 22:33

## 2023-04-20 RX ADMIN — OXYCODONE 10 MG: 5 TABLET ORAL at 10:08

## 2023-04-20 RX ADMIN — SODIUM CHLORIDE: 9 INJECTION, SOLUTION INTRAVENOUS at 10:11

## 2023-04-20 RX ADMIN — HYDROMORPHONE HYDROCHLORIDE 1 MG: 1 INJECTION, SOLUTION INTRAMUSCULAR; INTRAVENOUS; SUBCUTANEOUS at 11:13

## 2023-04-20 RX ADMIN — GABAPENTIN 100 MG: 100 CAPSULE ORAL at 14:22

## 2023-04-20 RX ADMIN — PIPERACILLIN AND TAZOBACTAM 3375 MG: 3; .375 INJECTION, POWDER, FOR SOLUTION INTRAVENOUS at 22:39

## 2023-04-20 RX ADMIN — PHENYLEPHRINE HYDROCHLORIDE 0.5 MG: 10 INJECTION INTRAVENOUS at 12:04

## 2023-04-20 RX ADMIN — MIDAZOLAM 1 MG: 1 INJECTION INTRAMUSCULAR; INTRAVENOUS at 23:51

## 2023-04-20 RX ADMIN — OXYCODONE 10 MG: 5 TABLET ORAL at 12:59

## 2023-04-20 ASSESSMENT — PAIN DESCRIPTION - DESCRIPTORS
DESCRIPTORS: ACHING;DISCOMFORT
DESCRIPTORS: STABBING;SHARP
DESCRIPTORS: ACHING;DISCOMFORT
DESCRIPTORS: SHARP;ACHING
DESCRIPTORS: ACHING;SHARP
DESCRIPTORS: ACHING;DISCOMFORT
DESCRIPTORS: ACHING;DISCOMFORT
DESCRIPTORS: ACHING;SHARP
DESCRIPTORS: ACHING;SHARP
DESCRIPTORS: ACHING;DISCOMFORT
DESCRIPTORS: ACHING;SHARP

## 2023-04-20 ASSESSMENT — PAIN DESCRIPTION - LOCATION
LOCATION: PENIS
LOCATION: GROIN
LOCATION: GROIN
LOCATION: PENIS
LOCATION: GROIN
LOCATION: PENIS
LOCATION: GROIN
LOCATION: PENIS
LOCATION: GROIN
LOCATION: GROIN
LOCATION: PENIS
LOCATION: GROIN
LOCATION: GROIN
LOCATION: PENIS
LOCATION: PENIS
LOCATION: GROIN
LOCATION: PENIS

## 2023-04-20 ASSESSMENT — PAIN SCALES - GENERAL
PAINLEVEL_OUTOF10: 10
PAINLEVEL_OUTOF10: 8
PAINLEVEL_OUTOF10: 10
PAINLEVEL_OUTOF10: 8
PAINLEVEL_OUTOF10: 10
PAINLEVEL_OUTOF10: 9
PAINLEVEL_OUTOF10: 8
PAINLEVEL_OUTOF10: 10
PAINLEVEL_OUTOF10: 0
PAINLEVEL_OUTOF10: 10
PAINLEVEL_OUTOF10: 0
PAINLEVEL_OUTOF10: 10

## 2023-04-20 ASSESSMENT — PAIN - FUNCTIONAL ASSESSMENT
PAIN_FUNCTIONAL_ASSESSMENT: ACTIVITIES ARE NOT PREVENTED
PAIN_FUNCTIONAL_ASSESSMENT: PREVENTS OR INTERFERES WITH ALL ACTIVE AND SOME PASSIVE ACTIVITIES
PAIN_FUNCTIONAL_ASSESSMENT: ACTIVITIES ARE NOT PREVENTED
PAIN_FUNCTIONAL_ASSESSMENT: INTOLERABLE, UNABLE TO DO ANY ACTIVE OR PASSIVE ACTIVITIES
PAIN_FUNCTIONAL_ASSESSMENT: ACTIVITIES ARE NOT PREVENTED

## 2023-04-20 ASSESSMENT — PAIN DESCRIPTION - ORIENTATION: ORIENTATION: LOWER;MID

## 2023-04-20 NOTE — DISCHARGE SUMMARY
sounds  Cardiovascular:  RRR S1S2 heard, no murmurs or gallops  Abdomen:  Soft, undistended, non tender, no organomegaly, BS present  Extremities: erected penis hard and  tender   No edema or cyanosis. Distal pulses well felt  Neurological : grossly normal ,, mood sad    Lab Results   Component Value Date    WBC 11.7 (H) 04/20/2023    HGB 8.2 (L) 04/20/2023    HCT 22.7 (L) 04/20/2023    MCV 97.7 04/20/2023     04/20/2023     Lab Results   Component Value Date     04/19/2023    K 4.3 04/19/2023     04/19/2023    CO2 25 04/19/2023    BUN 7 04/19/2023    CREATININE 0.6 (L) 04/19/2023    GLUCOSE 103 (H) 04/19/2023    CALCIUM 8.9 04/19/2023    PROT 7.0 04/18/2023    LABALBU 4.5 04/18/2023    BILITOT 5.1 (H) 04/18/2023    ALKPHOS 84 04/18/2023    AST 36 04/18/2023    ALT 16 04/18/2023    LABGLOM >60 04/19/2023    GFRAA >60 08/29/2022    AGRATIO 1.8 04/18/2023    GLOB 2.7 10/17/2021             CULTURES  N/A    RADIOLOGY  CT CHEST PULMONARY EMBOLISM W CONTRAST   Final Result   No evidence of pulmonary embolism or acute pulmonary abnormality however   small emboli more distally cannot be excluded due to suboptimal pulmonary   arterial opacification. Fairly marked bilateral gynecomastia. RECOMMENDATIONS:   Unavailable         XR CHEST PORTABLE   Final Result   No acute cardiopulmonary findings               Discharge Medications     Medication List        ASK your doctor about these medications      albuterol sulfate  (90 Base) MCG/ACT inhaler  Commonly known as: PROVENTIL;VENTOLIN;PROAIR     apixaban starter pack 5 MG Tbpk tablet  Commonly known as: ELIQUIS  Take 2 tablets by mouth 2 times daily for 7 days, THEN 1 tablet 2 times daily for 23 days.  Patient will take 2 tablets bid for 6.5 days as he had his first dose of Elqiuis in the hospital.  Start taking on: February 18, 2023     hydroxyurea 500 MG chemo capsule  Commonly known as: HYDREA     levalbuterol 0.31 MG/3ML

## 2023-04-20 NOTE — PROCEDURES
PROCEDURE NOTE     Patient Name:  Cheryle Madsen  :  2000  Date:  23  Time:  11:50 AM       Pre-Operative Diagnosis:  priapism, penile pain  Post-Operative Diagnosis: same     Procedure Performed:    1. Intracavernosal injection of phenylephrine       Provider:  Navid LUA  Anesthesia: 2% lidocaine WITHOUT epinephrine 0.5 cc injected at 9oclock and 3 oclock position at base of shaft of penis   Specimens: None  Estimated Blood Loss: None  Complications: None     Indications:  See prog/hospital notes. Pt with severe penile pain and  priapism    Description of Procedure: The patient was kept in a supine position. Verbal consent obtained at bedside due to urgency of procedure. The penis was prepped in usual sterile fashion. Lidocaine injected as noted above into skin. I then injected with dilute phenylephrine (500mcg/1mL), I first aspirated small amount of blood and then slowly into penis about 1cm deep, while he was on a monitor, 1cc (500 mcg) injected to each side at base of phallus.   Detumescence was not achieved     Findings:   As above     Plan:  -Per consult note    Navid Garcia PA-C  [facility]

## 2023-04-20 NOTE — CARE COORDINATION
Received voicemail from Osvaldo Adams with pt's insurance stating she can be reached at [phone] if any discharge needs. CM continues to not follow. BC Vazquez  Case Management Department  Phone: [phone] Fax: [phone]    Addendum at 4:35pm: Discharge order noted. Per RN, pt awaiting bed at [facility] and aware CM not following.

## 2023-04-21 PROBLEM — Z86.711 HISTORY OF PULMONARY EMBOLISM: Status: ACTIVE | Noted: 2023-04-21

## 2023-04-21 LAB
ALBUMIN SERPL-MCNC: 4.4 G/DL (ref 3.4–5)
ALBUMIN/GLOB SERPL: 1.9 {RATIO} (ref 1.1–2.2)
ALP SERPL-CCNC: 71 U/L (ref 40–129)
ALT SERPL-CCNC: 12 U/L (ref 10–40)
ANION GAP SERPL CALCULATED.3IONS-SCNC: 13 MMOL/L (ref 3–16)
AST SERPL-CCNC: 29 U/L (ref 15–37)
BASE EXCESS BLDV CALC-SCNC: -20.8 MMOL/L
BASOPHILS # BLD: 0.2 K/UL (ref 0–0.2)
BASOPHILS NFR BLD: 1.1 %
BILIRUB SERPL-MCNC: 4.5 MG/DL (ref 0–1)
BUN SERPL-MCNC: 10 MG/DL (ref 7–20)
CALCIUM SERPL-MCNC: 9.3 MG/DL (ref 8.3–10.6)
CHLORIDE SERPL-SCNC: 100 MMOL/L (ref 99–110)
CO2 BLDV-SCNC: 17 MMOL/L
CO2 SERPL-SCNC: 23 MMOL/L (ref 21–32)
COHGB MFR BLDV: 4.6 %
CREAT SERPL-MCNC: 0.7 MG/DL (ref 0.9–1.3)
DEPRECATED RDW RBC AUTO: 19.5 % (ref 12.4–15.4)
EOSINOPHIL # BLD: 0.1 K/UL (ref 0–0.6)
EOSINOPHIL NFR BLD: 0.4 %
GFR SERPLBLD CREATININE-BSD FMLA CKD-EPI: >60 ML/MIN/{1.73_M2}
GLUCOSE SERPL-MCNC: 128 MG/DL (ref 70–99)
HCO3 BLDV-SCNC: 14 MMOL/L (ref 23–29)
HCT VFR BLD AUTO: 23.2 % (ref 40.5–52.5)
HGB BLD-MCNC: 8 G/DL (ref 13.5–17.5)
LDH SERPL L TO P-CCNC: 713 U/L (ref 100–190)
LYMPHOCYTES # BLD: 3.6 K/UL (ref 1–5.1)
LYMPHOCYTES NFR BLD: 18.6 %
MCH RBC QN AUTO: 34.4 PG (ref 26–34)
MCHC RBC AUTO-ENTMCNC: 34.5 G/DL (ref 31–36)
MCV RBC AUTO: 99.6 FL (ref 80–100)
METHGB MFR BLDV: 0.2 %
MONOCYTES # BLD: 1.3 K/UL (ref 0–1.3)
MONOCYTES NFR BLD: 6.7 %
NEUTROPHILS # BLD: 14.2 K/UL (ref 1.7–7.7)
NEUTROPHILS NFR BLD: 73.2 %
O2 THERAPY: ABNORMAL
PCO2 BLDV: 76.9 MMHG (ref 40–50)
PH BLDV: 6.88 [PH] (ref 7.35–7.45)
PLATELET # BLD AUTO: 315 K/UL (ref 135–450)
PMV BLD AUTO: 8.2 FL (ref 5–10.5)
PO2 BLDV: 27 MMHG
POTASSIUM SERPL-SCNC: 4.7 MMOL/L (ref 3.5–5.1)
PROT SERPL-MCNC: 6.7 G/DL (ref 6.4–8.2)
RBC # BLD AUTO: 2.33 M/UL (ref 4.2–5.9)
SAO2 % BLDV: 36 %
SODIUM SERPL-SCNC: 136 MMOL/L (ref 136–145)
WBC # BLD AUTO: 19.4 K/UL (ref 4–11)

## 2023-04-21 PROCEDURE — 6370000000 HC RX 637 (ALT 250 FOR IP): Performed by: INTERNAL MEDICINE

## 2023-04-21 PROCEDURE — 2580000003 HC RX 258: Performed by: UROLOGY

## 2023-04-21 PROCEDURE — 2580000003 HC RX 258: Performed by: INTERNAL MEDICINE

## 2023-04-21 PROCEDURE — 85025 COMPLETE CBC W/AUTO DIFF WBC: CPT

## 2023-04-21 PROCEDURE — 36415 COLL VENOUS BLD VENIPUNCTURE: CPT

## 2023-04-21 PROCEDURE — 94761 N-INVAS EAR/PLS OXIMETRY MLT: CPT

## 2023-04-21 PROCEDURE — 80053 COMPREHEN METABOLIC PANEL: CPT

## 2023-04-21 PROCEDURE — 83615 LACTATE (LD) (LDH) ENZYME: CPT

## 2023-04-21 PROCEDURE — 6360000002 HC RX W HCPCS: Performed by: UROLOGY

## 2023-04-21 PROCEDURE — 2500000003 HC RX 250 WO HCPCS: Performed by: INTERNAL MEDICINE

## 2023-04-21 PROCEDURE — 1200000000 HC SEMI PRIVATE

## 2023-04-21 PROCEDURE — 6360000002 HC RX W HCPCS: Performed by: INTERNAL MEDICINE

## 2023-04-21 PROCEDURE — 2700000000 HC OXYGEN THERAPY PER DAY

## 2023-04-21 RX ORDER — POTASSIUM CHLORIDE 20 MEQ/1
40 TABLET, EXTENDED RELEASE ORAL PRN
Status: DISCONTINUED | OUTPATIENT
Start: 2023-04-21 | End: 2023-04-24

## 2023-04-21 RX ORDER — FENTANYL CITRATE 50 UG/ML
50 INJECTION, SOLUTION INTRAMUSCULAR; INTRAVENOUS ONCE
Status: COMPLETED | OUTPATIENT
Start: 2023-04-21 | End: 2023-04-21

## 2023-04-21 RX ORDER — MAGNESIUM SULFATE IN WATER 40 MG/ML
2000 INJECTION, SOLUTION INTRAVENOUS PRN
Status: DISCONTINUED | OUTPATIENT
Start: 2023-04-21 | End: 2023-04-24

## 2023-04-21 RX ORDER — SODIUM CHLORIDE 9 MG/ML
INJECTION, SOLUTION INTRAVENOUS PRN
Status: DISCONTINUED | OUTPATIENT
Start: 2023-04-21 | End: 2023-04-30 | Stop reason: HOSPADM

## 2023-04-21 RX ORDER — ONDANSETRON 4 MG/1
4 TABLET, ORALLY DISINTEGRATING ORAL EVERY 8 HOURS PRN
Status: DISCONTINUED | OUTPATIENT
Start: 2023-04-21 | End: 2023-04-30 | Stop reason: HOSPADM

## 2023-04-21 RX ORDER — POTASSIUM CHLORIDE 7.45 MG/ML
10 INJECTION INTRAVENOUS PRN
Status: DISCONTINUED | OUTPATIENT
Start: 2023-04-21 | End: 2023-04-24

## 2023-04-21 RX ORDER — GABAPENTIN 100 MG/1
100 CAPSULE ORAL EVERY 8 HOURS SCHEDULED
Status: DISCONTINUED | OUTPATIENT
Start: 2023-04-21 | End: 2023-04-30 | Stop reason: HOSPADM

## 2023-04-21 RX ORDER — HYDROXYUREA 500 MG/1
1000 CAPSULE ORAL 2 TIMES DAILY
Status: DISCONTINUED | OUTPATIENT
Start: 2023-04-21 | End: 2023-04-30 | Stop reason: HOSPADM

## 2023-04-21 RX ORDER — SODIUM CHLORIDE 9 MG/ML
INJECTION, SOLUTION INTRAVENOUS CONTINUOUS
Status: DISCONTINUED | OUTPATIENT
Start: 2023-04-21 | End: 2023-04-30 | Stop reason: HOSPADM

## 2023-04-21 RX ORDER — HEPARIN SODIUM 5000 [USP'U]/ML
5000 INJECTION, SOLUTION INTRAVENOUS; SUBCUTANEOUS EVERY 8 HOURS SCHEDULED
Status: DISCONTINUED | OUTPATIENT
Start: 2023-04-21 | End: 2023-04-30 | Stop reason: HOSPADM

## 2023-04-21 RX ORDER — ACETAMINOPHEN 650 MG/1
650 SUPPOSITORY RECTAL EVERY 6 HOURS PRN
Status: DISCONTINUED | OUTPATIENT
Start: 2023-04-21 | End: 2023-04-30 | Stop reason: HOSPADM

## 2023-04-21 RX ORDER — ACETAMINOPHEN 325 MG/1
650 TABLET ORAL EVERY 6 HOURS PRN
Status: DISCONTINUED | OUTPATIENT
Start: 2023-04-21 | End: 2023-04-30 | Stop reason: HOSPADM

## 2023-04-21 RX ORDER — SODIUM CHLORIDE 0.9 % (FLUSH) 0.9 %
5-40 SYRINGE (ML) INJECTION PRN
Status: DISCONTINUED | OUTPATIENT
Start: 2023-04-21 | End: 2023-04-30 | Stop reason: HOSPADM

## 2023-04-21 RX ORDER — ONDANSETRON 2 MG/ML
4 INJECTION INTRAMUSCULAR; INTRAVENOUS EVERY 6 HOURS PRN
Status: DISCONTINUED | OUTPATIENT
Start: 2023-04-21 | End: 2023-04-30 | Stop reason: HOSPADM

## 2023-04-21 RX ORDER — CEFAZOLIN SODIUM IN 0.9 % NACL 2 G/100 ML
2000 PLASTIC BAG, INJECTION (ML) INTRAVENOUS EVERY 8 HOURS
Status: COMPLETED | OUTPATIENT
Start: 2023-04-21 | End: 2023-04-24

## 2023-04-21 RX ORDER — OXYCODONE HYDROCHLORIDE 5 MG/1
10 TABLET ORAL
Status: DISCONTINUED | OUTPATIENT
Start: 2023-04-21 | End: 2023-04-24

## 2023-04-21 RX ORDER — SODIUM CHLORIDE 0.9 % (FLUSH) 0.9 %
5-40 SYRINGE (ML) INJECTION EVERY 12 HOURS SCHEDULED
Status: DISCONTINUED | OUTPATIENT
Start: 2023-04-21 | End: 2023-04-30 | Stop reason: HOSPADM

## 2023-04-21 RX ORDER — POLYETHYLENE GLYCOL 3350 17 G/17G
17 POWDER, FOR SOLUTION ORAL DAILY PRN
Status: DISCONTINUED | OUTPATIENT
Start: 2023-04-21 | End: 2023-04-30 | Stop reason: HOSPADM

## 2023-04-21 RX ADMIN — OXYCODONE 10 MG: 5 TABLET ORAL at 01:40

## 2023-04-21 RX ADMIN — SODIUM CHLORIDE: 9 INJECTION, SOLUTION INTRAVENOUS at 02:18

## 2023-04-21 RX ADMIN — HYDROMORPHONE HYDROCHLORIDE 2 MG: 1 INJECTION, SOLUTION INTRAMUSCULAR; INTRAVENOUS; SUBCUTANEOUS at 22:19

## 2023-04-21 RX ADMIN — HEPARIN SODIUM 5000 UNITS: 5000 INJECTION INTRAVENOUS; SUBCUTANEOUS at 11:46

## 2023-04-21 RX ADMIN — OXYCODONE 10 MG: 5 TABLET ORAL at 14:59

## 2023-04-21 RX ADMIN — HYDROMORPHONE HYDROCHLORIDE 2 MG: 1 INJECTION, SOLUTION INTRAMUSCULAR; INTRAVENOUS; SUBCUTANEOUS at 12:49

## 2023-04-21 RX ADMIN — ACETAMINOPHEN 650 MG: 325 TABLET, FILM COATED ORAL at 16:02

## 2023-04-21 RX ADMIN — SODIUM CHLORIDE, PRESERVATIVE FREE 10 ML: 5 INJECTION INTRAVENOUS at 12:52

## 2023-04-21 RX ADMIN — HYDROXYUREA 1000 MG: 500 CAPSULE ORAL at 22:24

## 2023-04-21 RX ADMIN — OXYCODONE 10 MG: 5 TABLET ORAL at 07:50

## 2023-04-21 RX ADMIN — HYDROMORPHONE HYDROCHLORIDE 1 MG: 1 INJECTION, SOLUTION INTRAMUSCULAR; INTRAVENOUS; SUBCUTANEOUS at 01:03

## 2023-04-21 RX ADMIN — GABAPENTIN 100 MG: 100 CAPSULE ORAL at 14:38

## 2023-04-21 RX ADMIN — HEPARIN SODIUM 5000 UNITS: 5000 INJECTION INTRAVENOUS; SUBCUTANEOUS at 21:13

## 2023-04-21 RX ADMIN — Medication 2000 MG: at 13:50

## 2023-04-21 RX ADMIN — GABAPENTIN 100 MG: 100 CAPSULE ORAL at 21:13

## 2023-04-21 RX ADMIN — OXYCODONE 10 MG: 5 TABLET ORAL at 21:15

## 2023-04-21 RX ADMIN — OXYCODONE 10 MG: 5 TABLET ORAL at 05:21

## 2023-04-21 RX ADMIN — Medication 2000 MG: at 21:16

## 2023-04-21 RX ADMIN — HYDROMORPHONE HYDROCHLORIDE 2 MG: 1 INJECTION, SOLUTION INTRAMUSCULAR; INTRAVENOUS; SUBCUTANEOUS at 02:54

## 2023-04-21 RX ADMIN — GABAPENTIN 100 MG: 100 CAPSULE ORAL at 05:21

## 2023-04-21 RX ADMIN — HYDROMORPHONE HYDROCHLORIDE 2 MG: 1 INJECTION, SOLUTION INTRAMUSCULAR; INTRAVENOUS; SUBCUTANEOUS at 16:02

## 2023-04-21 RX ADMIN — HYDROMORPHONE HYDROCHLORIDE 2 MG: 1 INJECTION, SOLUTION INTRAMUSCULAR; INTRAVENOUS; SUBCUTANEOUS at 09:05

## 2023-04-21 RX ADMIN — HYDROXYUREA 1000 MG: 500 CAPSULE ORAL at 08:43

## 2023-04-21 RX ADMIN — FENTANYL CITRATE 50 MCG: 50 INJECTION INTRAMUSCULAR; INTRAVENOUS at 02:00

## 2023-04-21 RX ADMIN — SODIUM CHLORIDE: 9 INJECTION, SOLUTION INTRAVENOUS at 08:20

## 2023-04-21 RX ADMIN — SODIUM CHLORIDE: 9 INJECTION, SOLUTION INTRAVENOUS at 18:41

## 2023-04-21 RX ADMIN — OXYCODONE 10 MG: 5 TABLET ORAL at 18:08

## 2023-04-21 RX ADMIN — HYDROMORPHONE HYDROCHLORIDE 2 MG: 1 INJECTION, SOLUTION INTRAMUSCULAR; INTRAVENOUS; SUBCUTANEOUS at 19:13

## 2023-04-21 RX ADMIN — HYDROMORPHONE HYDROCHLORIDE 2 MG: 1 INJECTION, SOLUTION INTRAMUSCULAR; INTRAVENOUS; SUBCUTANEOUS at 06:00

## 2023-04-21 RX ADMIN — OXYCODONE 10 MG: 5 TABLET ORAL at 11:46

## 2023-04-21 ASSESSMENT — PAIN SCALES - GENERAL
PAINLEVEL_OUTOF10: 9
PAINLEVEL_OUTOF10: 10
PAINLEVEL_OUTOF10: 9
PAINLEVEL_OUTOF10: 10
PAINLEVEL_OUTOF10: 8
PAINLEVEL_OUTOF10: 10

## 2023-04-21 ASSESSMENT — PAIN DESCRIPTION - LOCATION
LOCATION: GROIN;SCROTUM
LOCATION: SCROTUM;PERINEUM
LOCATION: SACRUM;PENIS
LOCATION: SCROTUM;GROIN
LOCATION: SCROTUM
LOCATION: GROIN;SCROTUM
LOCATION: SCROTUM;GROIN

## 2023-04-21 ASSESSMENT — PAIN - FUNCTIONAL ASSESSMENT
PAIN_FUNCTIONAL_ASSESSMENT: PREVENTS OR INTERFERES WITH MANY ACTIVE NOT PASSIVE ACTIVITIES
PAIN_FUNCTIONAL_ASSESSMENT: INTOLERABLE, UNABLE TO DO ANY ACTIVE OR PASSIVE ACTIVITIES
PAIN_FUNCTIONAL_ASSESSMENT: PREVENTS OR INTERFERES SOME ACTIVE ACTIVITIES AND ADLS
PAIN_FUNCTIONAL_ASSESSMENT: PREVENTS OR INTERFERES SOME ACTIVE ACTIVITIES AND ADLS
PAIN_FUNCTIONAL_ASSESSMENT: PREVENTS OR INTERFERES WITH ALL ACTIVE AND SOME PASSIVE ACTIVITIES
PAIN_FUNCTIONAL_ASSESSMENT: PREVENTS OR INTERFERES SOME ACTIVE ACTIVITIES AND ADLS
PAIN_FUNCTIONAL_ASSESSMENT: INTOLERABLE, UNABLE TO DO ANY ACTIVE OR PASSIVE ACTIVITIES
PAIN_FUNCTIONAL_ASSESSMENT: PREVENTS OR INTERFERES SOME ACTIVE ACTIVITIES AND ADLS
PAIN_FUNCTIONAL_ASSESSMENT: PREVENTS OR INTERFERES WITH ALL ACTIVE AND SOME PASSIVE ACTIVITIES

## 2023-04-21 ASSESSMENT — PAIN DESCRIPTION - ORIENTATION
ORIENTATION: MID

## 2023-04-21 ASSESSMENT — PAIN DESCRIPTION - DESCRIPTORS
DESCRIPTORS: SHARP;STABBING;ACHING
DESCRIPTORS: ACHING;SHARP
DESCRIPTORS: SHARP;STABBING;ACHING
DESCRIPTORS: ACHING;SHARP;SHOOTING;STABBING
DESCRIPTORS: ACHING;SHARP
DESCRIPTORS: ACHING;SHARP;STABBING
DESCRIPTORS: ACHING;STABBING;SORE
DESCRIPTORS: ACHING;SHARP;STABBING
DESCRIPTORS: SHARP;SORE;STABBING

## 2023-04-21 NOTE — ANESTHESIA PRE PROCEDURE
Department of Anesthesiology  Preprocedure Note       Name:  Dian Leyva   Age:  20 y.o.  :  2000                                          MRN:  [id number]         Date:  2023      Surgeon: Merna Anand):  Uche Vicente MD    Procedure: Procedure(s):  TAKE DOWN OF ISCHEMIC PRIAPISM, POSSIBLE SEMI RIGID PENILE PROSTHESIS    Medications prior to admission:   Prior to Admission medications    Medication Sig Start Date End Date Taking? Authorizing Provider   apixaban starter pack (ELIQUIS) 5 MG TBPK tablet Take 2 tablets by mouth 2 times daily for 7 days, THEN 1 tablet 2 times daily for 23 days. Patient will take 2 tablets bid for 6.5 days as he had his first dose of Elqiuis in the hospital.  Patient not taking: Reported on 2023 2/18/23 3/20/23  Juan Miguel Sofia MD   hydroxyurea (HYDREA) 500 MG chemo capsule Take 2 capsules by mouth 2 times daily 23   Nahomi Lambert MD   pseudoephedrine (SUDAFED) 60 MG tablet Take 1 tablet by mouth daily as needed (priapism) 23   Nahomi Lambert MD   oxyCODONE HCl (OXY-IR) 10 MG immediate release tablet Take 20 mg by mouth every 4 hours as needed for Pain. Patient states he takes 20mg q 4 hours as needed  Patient not taking: Reported on 2023    Historical Provider, MD   albuterol sulfate HFA (PROVENTIL;VENTOLIN;PROAIR) 108 (90 Base) MCG/ACT inhaler Albuterol HFA Inhaler 90 mcg/actuation  inhaled  2 puffs prn     Active    Historical Provider, MD   levalbuterol (XOPENEX) 0.31 MG/3ML nebulization Levalbuterol Nebulized    2 puffs prn     Active    Historical Provider, MD       Current medications:    Current Facility-Administered Medications   Medication Dose Route Frequency Provider Last Rate Last Admin    HYDROmorphone (DILAUDID) injection 0.5 mg  0.5 mg IntraVENous Q5 Min PRN Murray Swanson MD   0.5 mg at 23 1321       Allergies:     Allergies   Allergen Reactions    Morphine Shortness Of Breath     Other reaction(s):

## 2023-04-21 NOTE — ANESTHESIA POSTPROCEDURE EVALUATION
Department of Anesthesiology  Postprocedure Note    Patient: Ciara Lombardo  MRN: [id number]  YOB: 2000  Date of evaluation: 4/21/2023      Procedure Summary     Date: 04/20/23 Room / Location: [address]    Anesthesia Start: 2228 Anesthesia Stop: 2348    Procedure: TAKE DOWN OF ISCHEMIC PRIAPISM (Penis) Diagnosis:       Priapism      (Priapism [N48.30])    Surgeons: Phani Zabala MD Responsible Provider: Jerelyn Angelucci, MD    Anesthesia Type: general ASA Status: 3 - Emergent          Anesthesia Type: No value filed.     Romy Phase I: Romy Score: 10    Romy Phase II:        Anesthesia Post Evaluation    Patient location during evaluation: PACU  Level of consciousness: awake  Airway patency: patent  Nausea & Vomiting: no nausea  Complications: no  Cardiovascular status: blood pressure returned to baseline  Respiratory status: acceptable  Hydration status: euvolemic

## 2023-04-22 LAB
ALBUMIN SERPL-MCNC: 3.9 G/DL (ref 3.4–5)
ALBUMIN/GLOB SERPL: 1.2 {RATIO} (ref 1.1–2.2)
ALP SERPL-CCNC: 66 U/L (ref 40–129)
ALT SERPL-CCNC: 10 U/L (ref 10–40)
AMORPH SED URNS QL MICRO: ABNORMAL /HPF
ANION GAP SERPL CALCULATED.3IONS-SCNC: 11 MMOL/L (ref 3–16)
ANISOCYTOSIS BLD QL SMEAR: ABNORMAL
AST SERPL-CCNC: 26 U/L (ref 15–37)
BACTERIA BLD CULT ORG #2: NORMAL
BACTERIA BLD CULT: NORMAL
BACTERIA URNS QL MICRO: ABNORMAL /HPF
BASOPHILS # BLD: 0 K/UL (ref 0–0.2)
BASOPHILS NFR BLD: 0 %
BILIRUB SERPL-MCNC: 8.9 MG/DL (ref 0–1)
BILIRUB UR QL STRIP.AUTO: ABNORMAL
BUN SERPL-MCNC: 10 MG/DL (ref 7–20)
BURR CELLS BLD QL SMEAR: ABNORMAL
CALCIUM SERPL-MCNC: 9.5 MG/DL (ref 8.3–10.6)
CHLORIDE SERPL-SCNC: 101 MMOL/L (ref 99–110)
CLARITY UR: CLEAR
CO2 SERPL-SCNC: 23 MMOL/L (ref 21–32)
COLOR UR: ABNORMAL
CREAT SERPL-MCNC: 0.6 MG/DL (ref 0.9–1.3)
DEPRECATED RDW RBC AUTO: 19.4 % (ref 12.4–15.4)
EOSINOPHIL # BLD: 0 K/UL (ref 0–0.6)
EOSINOPHIL NFR BLD: 0 %
EPI CELLS #/AREA URNS HPF: ABNORMAL /HPF (ref 0–5)
GFR SERPLBLD CREATININE-BSD FMLA CKD-EPI: >60 ML/MIN/{1.73_M2}
GLUCOSE BLD-MCNC: 166 MG/DL (ref 70–99)
GLUCOSE SERPL-MCNC: 111 MG/DL (ref 70–99)
GLUCOSE UR STRIP.AUTO-MCNC: NEGATIVE MG/DL
HCT VFR BLD AUTO: 22.7 % (ref 40.5–52.5)
HGB BLD-MCNC: 7.7 G/DL (ref 13.5–17.5)
HGB UR QL STRIP.AUTO: ABNORMAL
IMMATURE RETIC FRACT: 0.55 (ref 0.21–0.37)
KETONES UR STRIP.AUTO-MCNC: NEGATIVE MG/DL
LACTATE BLDV-SCNC: 1.1 MMOL/L (ref 0.4–2)
LACTATE BLDV-SCNC: 1.1 MMOL/L (ref 0.4–2)
LDH SERPL L TO P-CCNC: 636 U/L (ref 100–190)
LEUKOCYTE ESTERASE UR QL STRIP.AUTO: ABNORMAL
LYMPHOCYTES # BLD: 3.9 K/UL (ref 1–5.1)
LYMPHOCYTES NFR BLD: 14 %
MACROCYTES BLD QL SMEAR: ABNORMAL
MCH RBC QN AUTO: 34.2 PG (ref 26–34)
MCHC RBC AUTO-ENTMCNC: 34 G/DL (ref 31–36)
MCV RBC AUTO: 100.8 FL (ref 80–100)
MICROCYTES BLD QL SMEAR: ABNORMAL
MONOCYTES # BLD: 0.3 K/UL (ref 0–1.3)
MONOCYTES NFR BLD: 1 %
MYELOCYTES NFR BLD MANUAL: 1 %
NEUTROPHILS # BLD: 21.6 K/UL (ref 1.7–7.7)
NEUTROPHILS NFR BLD: 77 %
NEUTS BAND NFR BLD MANUAL: 6 % (ref 0–7)
NITRITE UR QL STRIP.AUTO: NEGATIVE
NRBC BLD-RTO: 3 /100 WBC
OVALOCYTES BLD QL SMEAR: ABNORMAL
PATH INTERP BLD-IMP: NO
PERFORMED ON: ABNORMAL
PH UR STRIP.AUTO: 6.5 [PH] (ref 5–8)
PLATELET # BLD AUTO: 321 K/UL (ref 135–450)
PMV BLD AUTO: 9.1 FL (ref 5–10.5)
POIKILOCYTOSIS BLD QL SMEAR: ABNORMAL
POLYCHROMASIA BLD QL SMEAR: ABNORMAL
POTASSIUM SERPL-SCNC: 4.6 MMOL/L (ref 3.5–5.1)
PROCALCITONIN SERPL IA-MCNC: 0.17 NG/ML (ref 0–0.15)
PROT SERPL-MCNC: 7.1 G/DL (ref 6.4–8.2)
PROT UR STRIP.AUTO-MCNC: 30 MG/DL
RBC # BLD AUTO: 2.25 M/UL (ref 4.2–5.9)
RBC #/AREA URNS HPF: ABNORMAL /HPF (ref 0–4)
RETICS # AUTO: 0.09 M/UL
RETICS/RBC NFR AUTO: 4.07 % (ref 0.5–2.18)
SICKLE CELLS BLD QL SMEAR: ABNORMAL
SODIUM SERPL-SCNC: 135 MMOL/L (ref 136–145)
SP GR UR STRIP.AUTO: 1.01 (ref 1–1.03)
STOMATOCYTES BLD QL SMEAR: ABNORMAL
TARGETS BLD QL SMEAR: ABNORMAL
UA COMPLETE W REFLEX CULTURE PNL UR: ABNORMAL
UA DIPSTICK W REFLEX MICRO PNL UR: YES
URN SPEC COLLECT METH UR: ABNORMAL
UROBILINOGEN UR STRIP-ACNC: 0.2 E.U./DL
VARIANT LYMPHS NFR BLD MANUAL: 1 % (ref 0–6)
WBC # BLD AUTO: 25.7 K/UL (ref 4–11)
WBC #/AREA URNS HPF: ABNORMAL /HPF (ref 0–5)

## 2023-04-22 PROCEDURE — 2580000003 HC RX 258: Performed by: UROLOGY

## 2023-04-22 PROCEDURE — 6360000002 HC RX W HCPCS: Performed by: NURSE PRACTITIONER

## 2023-04-22 PROCEDURE — 6360000002 HC RX W HCPCS: Performed by: UROLOGY

## 2023-04-22 PROCEDURE — 84145 PROCALCITONIN (PCT): CPT

## 2023-04-22 PROCEDURE — 6370000000 HC RX 637 (ALT 250 FOR IP): Performed by: INTERNAL MEDICINE

## 2023-04-22 PROCEDURE — 6360000002 HC RX W HCPCS: Performed by: INTERNAL MEDICINE

## 2023-04-22 PROCEDURE — 83605 ASSAY OF LACTIC ACID: CPT

## 2023-04-22 PROCEDURE — 81001 URINALYSIS AUTO W/SCOPE: CPT

## 2023-04-22 PROCEDURE — 87040 BLOOD CULTURE FOR BACTERIA: CPT

## 2023-04-22 PROCEDURE — 83615 LACTATE (LD) (LDH) ENZYME: CPT

## 2023-04-22 PROCEDURE — 85025 COMPLETE CBC W/AUTO DIFF WBC: CPT

## 2023-04-22 PROCEDURE — 2580000003 HC RX 258: Performed by: NURSE PRACTITIONER

## 2023-04-22 PROCEDURE — 2580000003 HC RX 258: Performed by: INTERNAL MEDICINE

## 2023-04-22 PROCEDURE — 1200000000 HC SEMI PRIVATE

## 2023-04-22 PROCEDURE — 80053 COMPREHEN METABOLIC PANEL: CPT

## 2023-04-22 PROCEDURE — 85045 AUTOMATED RETICULOCYTE COUNT: CPT

## 2023-04-22 PROCEDURE — 36415 COLL VENOUS BLD VENIPUNCTURE: CPT

## 2023-04-22 RX ORDER — MIDODRINE HYDROCHLORIDE 5 MG/1
5 TABLET ORAL
Status: DISCONTINUED | OUTPATIENT
Start: 2023-04-22 | End: 2023-04-30 | Stop reason: HOSPADM

## 2023-04-22 RX ORDER — KETOROLAC TROMETHAMINE 30 MG/ML
15 INJECTION, SOLUTION INTRAMUSCULAR; INTRAVENOUS EVERY 6 HOURS PRN
Status: DISPENSED | OUTPATIENT
Start: 2023-04-22 | End: 2023-04-27

## 2023-04-22 RX ORDER — 0.9 % SODIUM CHLORIDE 0.9 %
1000 INTRAVENOUS SOLUTION INTRAVENOUS ONCE
Status: COMPLETED | OUTPATIENT
Start: 2023-04-22 | End: 2023-04-22

## 2023-04-22 RX ADMIN — CEFEPIME 2000 MG: 2 INJECTION, POWDER, FOR SOLUTION INTRAVENOUS at 11:44

## 2023-04-22 RX ADMIN — OXYCODONE 10 MG: 5 TABLET ORAL at 22:40

## 2023-04-22 RX ADMIN — GABAPENTIN 100 MG: 100 CAPSULE ORAL at 14:14

## 2023-04-22 RX ADMIN — KETOROLAC TROMETHAMINE 15 MG: 30 INJECTION, SOLUTION INTRAMUSCULAR; INTRAVENOUS at 08:45

## 2023-04-22 RX ADMIN — SODIUM CHLORIDE: 9 INJECTION, SOLUTION INTRAVENOUS at 06:13

## 2023-04-22 RX ADMIN — HYDROMORPHONE HYDROCHLORIDE 0.5 MG: 1 INJECTION, SOLUTION INTRAMUSCULAR; INTRAVENOUS; SUBCUTANEOUS at 09:22

## 2023-04-22 RX ADMIN — VANCOMYCIN HYDROCHLORIDE 1500 MG: 10 INJECTION, POWDER, LYOPHILIZED, FOR SOLUTION INTRAVENOUS at 14:56

## 2023-04-22 RX ADMIN — KETOROLAC TROMETHAMINE 15 MG: 30 INJECTION, SOLUTION INTRAMUSCULAR; INTRAVENOUS at 17:49

## 2023-04-22 RX ADMIN — VANCOMYCIN HYDROCHLORIDE 2250 MG: 1 INJECTION, POWDER, LYOPHILIZED, FOR SOLUTION INTRAVENOUS at 01:33

## 2023-04-22 RX ADMIN — Medication 2000 MG: at 14:18

## 2023-04-22 RX ADMIN — OXYCODONE 10 MG: 5 TABLET ORAL at 03:07

## 2023-04-22 RX ADMIN — Medication 2000 MG: at 22:20

## 2023-04-22 RX ADMIN — Medication 2000 MG: at 06:13

## 2023-04-22 RX ADMIN — ACETAMINOPHEN 650 MG: 325 TABLET, FILM COATED ORAL at 07:58

## 2023-04-22 RX ADMIN — HYDROMORPHONE HYDROCHLORIDE 2 MG: 1 INJECTION, SOLUTION INTRAMUSCULAR; INTRAVENOUS; SUBCUTANEOUS at 04:19

## 2023-04-22 RX ADMIN — HYDROXYUREA 1000 MG: 500 CAPSULE ORAL at 22:20

## 2023-04-22 RX ADMIN — ACETAMINOPHEN 650 MG: 325 TABLET, FILM COATED ORAL at 15:01

## 2023-04-22 RX ADMIN — HEPARIN SODIUM 5000 UNITS: 5000 INJECTION INTRAVENOUS; SUBCUTANEOUS at 22:26

## 2023-04-22 RX ADMIN — SODIUM CHLORIDE 1000 ML: 9 INJECTION, SOLUTION INTRAVENOUS at 08:22

## 2023-04-22 RX ADMIN — HEPARIN SODIUM 5000 UNITS: 5000 INJECTION INTRAVENOUS; SUBCUTANEOUS at 09:21

## 2023-04-22 RX ADMIN — HYDROXYUREA 1000 MG: 500 CAPSULE ORAL at 07:58

## 2023-04-22 RX ADMIN — HEPARIN SODIUM 5000 UNITS: 5000 INJECTION INTRAVENOUS; SUBCUTANEOUS at 14:57

## 2023-04-22 RX ADMIN — OXYCODONE 10 MG: 5 TABLET ORAL at 06:15

## 2023-04-22 RX ADMIN — ACETAMINOPHEN 650 MG: 325 TABLET, FILM COATED ORAL at 00:08

## 2023-04-22 RX ADMIN — HYDROMORPHONE HYDROCHLORIDE 2 MG: 1 INJECTION, SOLUTION INTRAMUSCULAR; INTRAVENOUS; SUBCUTANEOUS at 17:46

## 2023-04-22 RX ADMIN — OXYCODONE 10 MG: 5 TABLET ORAL at 00:08

## 2023-04-22 RX ADMIN — GABAPENTIN 100 MG: 100 CAPSULE ORAL at 06:15

## 2023-04-22 RX ADMIN — HYDROMORPHONE HYDROCHLORIDE 2 MG: 1 INJECTION, SOLUTION INTRAMUSCULAR; INTRAVENOUS; SUBCUTANEOUS at 01:17

## 2023-04-22 RX ADMIN — MIDODRINE HYDROCHLORIDE 5 MG: 5 TABLET ORAL at 16:29

## 2023-04-22 RX ADMIN — HYDROMORPHONE HYDROCHLORIDE 2 MG: 1 INJECTION, SOLUTION INTRAMUSCULAR; INTRAVENOUS; SUBCUTANEOUS at 07:55

## 2023-04-22 RX ADMIN — OXYCODONE 10 MG: 5 TABLET ORAL at 14:14

## 2023-04-22 RX ADMIN — SODIUM CHLORIDE 1000 ML: 9 INJECTION, SOLUTION INTRAVENOUS at 14:06

## 2023-04-22 RX ADMIN — GABAPENTIN 100 MG: 100 CAPSULE ORAL at 22:26

## 2023-04-22 RX ADMIN — CEFEPIME 2000 MG: 2 INJECTION, POWDER, FOR SOLUTION INTRAVENOUS at 20:36

## 2023-04-22 ASSESSMENT — PAIN SCALES - GENERAL
PAINLEVEL_OUTOF10: 10
PAINLEVEL_OUTOF10: 9
PAINLEVEL_OUTOF10: 9
PAINLEVEL_OUTOF10: 10
PAINLEVEL_OUTOF10: 10
PAINLEVEL_OUTOF10: 9
PAINLEVEL_OUTOF10: 9
PAINLEVEL_OUTOF10: 10

## 2023-04-22 ASSESSMENT — PAIN DESCRIPTION - LOCATION
LOCATION: PENIS
LOCATION: PERINEUM;PENIS
LOCATION: PENIS
LOCATION: SCROTUM;PENIS
LOCATION: GROIN

## 2023-04-22 ASSESSMENT — PAIN DESCRIPTION - PAIN TYPE
TYPE: ACUTE PAIN

## 2023-04-22 ASSESSMENT — PAIN - FUNCTIONAL ASSESSMENT
PAIN_FUNCTIONAL_ASSESSMENT: PREVENTS OR INTERFERES SOME ACTIVE ACTIVITIES AND ADLS
PAIN_FUNCTIONAL_ASSESSMENT: PREVENTS OR INTERFERES SOME ACTIVE ACTIVITIES AND ADLS
PAIN_FUNCTIONAL_ASSESSMENT: ACTIVITIES ARE NOT PREVENTED
PAIN_FUNCTIONAL_ASSESSMENT: PREVENTS OR INTERFERES WITH MANY ACTIVE NOT PASSIVE ACTIVITIES
PAIN_FUNCTIONAL_ASSESSMENT: PREVENTS OR INTERFERES WITH MANY ACTIVE NOT PASSIVE ACTIVITIES
PAIN_FUNCTIONAL_ASSESSMENT: PREVENTS OR INTERFERES SOME ACTIVE ACTIVITIES AND ADLS

## 2023-04-22 ASSESSMENT — PAIN DESCRIPTION - DESCRIPTORS
DESCRIPTORS: DISCOMFORT;ACHING;SHARP
DESCRIPTORS: ACHING;DISCOMFORT;SHARP;THROBBING
DESCRIPTORS: SHARP
DESCRIPTORS: SHARP
DESCRIPTORS: ACHING;SHOOTING
DESCRIPTORS: SHARP
DESCRIPTORS: SHARP
DESCRIPTORS: SHARP;ACHING
DESCRIPTORS: SHARP

## 2023-04-22 ASSESSMENT — PAIN DESCRIPTION - ORIENTATION
ORIENTATION: DISTAL
ORIENTATION: MID
ORIENTATION: DISTAL

## 2023-04-23 LAB
ABO + RH BLD: NORMAL
ANION GAP SERPL CALCULATED.3IONS-SCNC: 10 MMOL/L (ref 3–16)
BASOPHILS # BLD: 0.2 K/UL (ref 0–0.2)
BASOPHILS NFR BLD: 0.7 %
BLD GP AB SCN SERPL QL: NORMAL
BLOOD BANK DISPENSE STATUS: NORMAL
BLOOD BANK DISPENSE STATUS: NORMAL
BLOOD BANK PRODUCT CODE: NORMAL
BLOOD BANK PRODUCT CODE: NORMAL
BPU ID: NORMAL
BPU ID: NORMAL
BUN SERPL-MCNC: 11 MG/DL (ref 7–20)
CALCIUM SERPL-MCNC: 9.3 MG/DL (ref 8.3–10.6)
CHLORIDE SERPL-SCNC: 103 MMOL/L (ref 99–110)
CO2 SERPL-SCNC: 21 MMOL/L (ref 21–32)
CREAT SERPL-MCNC: 0.7 MG/DL (ref 0.9–1.3)
DEPRECATED RDW RBC AUTO: 18.6 % (ref 12.4–15.4)
DESCRIPTION BLOOD BANK: NORMAL
DESCRIPTION BLOOD BANK: NORMAL
EOSINOPHIL # BLD: 0.1 K/UL (ref 0–0.6)
EOSINOPHIL NFR BLD: 0.6 %
GFR SERPLBLD CREATININE-BSD FMLA CKD-EPI: >60 ML/MIN/{1.73_M2}
GLUCOSE SERPL-MCNC: 120 MG/DL (ref 70–99)
HCT VFR BLD AUTO: 18 % (ref 40.5–52.5)
HCT VFR BLD AUTO: 18.3 % (ref 40.5–52.5)
HGB BLD-MCNC: 6 G/DL (ref 13.5–17.5)
HGB BLD-MCNC: 6.1 G/DL (ref 13.5–17.5)
LDH SERPL L TO P-CCNC: 540 U/L (ref 100–190)
LYMPHOCYTES # BLD: 0.8 K/UL (ref 1–5.1)
LYMPHOCYTES NFR BLD: 3.3 %
MCH RBC QN AUTO: 33.6 PG (ref 26–34)
MCHC RBC AUTO-ENTMCNC: 33.2 G/DL (ref 31–36)
MCV RBC AUTO: 101.3 FL (ref 80–100)
MONOCYTES # BLD: 1.7 K/UL (ref 0–1.3)
MONOCYTES NFR BLD: 6.7 %
NEUTROPHILS # BLD: 22 K/UL (ref 1.7–7.7)
NEUTROPHILS NFR BLD: 88.7 %
PATH INTERP BLD-IMP: NO
PLATELET # BLD AUTO: 318 K/UL (ref 135–450)
PMV BLD AUTO: 8.7 FL (ref 5–10.5)
POTASSIUM SERPL-SCNC: 4.4 MMOL/L (ref 3.5–5.1)
RBC # BLD AUTO: 1.78 M/UL (ref 4.2–5.9)
SODIUM SERPL-SCNC: 134 MMOL/L (ref 136–145)
VANCOMYCIN SERPL-MCNC: 7.1 UG/ML
WBC # BLD AUTO: 24.8 K/UL (ref 4–11)

## 2023-04-23 PROCEDURE — 2580000003 HC RX 258: Performed by: UROLOGY

## 2023-04-23 PROCEDURE — 30233N1 TRANSFUSION OF NONAUTOLOGOUS RED BLOOD CELLS INTO PERIPHERAL VEIN, PERCUTANEOUS APPROACH: ICD-10-PCS | Performed by: INTERNAL MEDICINE

## 2023-04-23 PROCEDURE — 2580000003 HC RX 258: Performed by: NURSE PRACTITIONER

## 2023-04-23 PROCEDURE — 85018 HEMOGLOBIN: CPT

## 2023-04-23 PROCEDURE — 6360000002 HC RX W HCPCS: Performed by: INTERNAL MEDICINE

## 2023-04-23 PROCEDURE — 1200000000 HC SEMI PRIVATE

## 2023-04-23 PROCEDURE — 2580000003 HC RX 258: Performed by: INTERNAL MEDICINE

## 2023-04-23 PROCEDURE — 85025 COMPLETE CBC W/AUTO DIFF WBC: CPT

## 2023-04-23 PROCEDURE — 36430 TRANSFUSION BLD/BLD COMPNT: CPT

## 2023-04-23 PROCEDURE — 86901 BLOOD TYPING SEROLOGIC RH(D): CPT

## 2023-04-23 PROCEDURE — 6370000000 HC RX 637 (ALT 250 FOR IP): Performed by: INTERNAL MEDICINE

## 2023-04-23 PROCEDURE — P9016 RBC LEUKOCYTES REDUCED: HCPCS

## 2023-04-23 PROCEDURE — 86900 BLOOD TYPING SEROLOGIC ABO: CPT

## 2023-04-23 PROCEDURE — 85014 HEMATOCRIT: CPT

## 2023-04-23 PROCEDURE — 83615 LACTATE (LD) (LDH) ENZYME: CPT

## 2023-04-23 PROCEDURE — 80202 ASSAY OF VANCOMYCIN: CPT

## 2023-04-23 PROCEDURE — 6360000002 HC RX W HCPCS: Performed by: UROLOGY

## 2023-04-23 PROCEDURE — 86850 RBC ANTIBODY SCREEN: CPT

## 2023-04-23 PROCEDURE — 80048 BASIC METABOLIC PNL TOTAL CA: CPT

## 2023-04-23 PROCEDURE — 86923 COMPATIBILITY TEST ELECTRIC: CPT

## 2023-04-23 PROCEDURE — 36415 COLL VENOUS BLD VENIPUNCTURE: CPT

## 2023-04-23 RX ORDER — 0.9 % SODIUM CHLORIDE 0.9 %
1000 INTRAVENOUS SOLUTION INTRAVENOUS ONCE
Status: COMPLETED | OUTPATIENT
Start: 2023-04-23 | End: 2023-04-23

## 2023-04-23 RX ORDER — SODIUM CHLORIDE 9 MG/ML
INJECTION, SOLUTION INTRAVENOUS PRN
Status: DISCONTINUED | OUTPATIENT
Start: 2023-04-23 | End: 2023-04-30 | Stop reason: HOSPADM

## 2023-04-23 RX ADMIN — HYDROXYUREA 1000 MG: 500 CAPSULE ORAL at 08:42

## 2023-04-23 RX ADMIN — MIDODRINE HYDROCHLORIDE 5 MG: 5 TABLET ORAL at 18:05

## 2023-04-23 RX ADMIN — HYDROMORPHONE HYDROCHLORIDE 2 MG: 1 INJECTION, SOLUTION INTRAMUSCULAR; INTRAVENOUS; SUBCUTANEOUS at 20:55

## 2023-04-23 RX ADMIN — CEFEPIME 2000 MG: 2 INJECTION, POWDER, FOR SOLUTION INTRAVENOUS at 10:20

## 2023-04-23 RX ADMIN — GABAPENTIN 100 MG: 100 CAPSULE ORAL at 14:45

## 2023-04-23 RX ADMIN — HYDROMORPHONE HYDROCHLORIDE 2 MG: 1 INJECTION, SOLUTION INTRAMUSCULAR; INTRAVENOUS; SUBCUTANEOUS at 03:54

## 2023-04-23 RX ADMIN — HYDROXYUREA 1000 MG: 500 CAPSULE ORAL at 21:43

## 2023-04-23 RX ADMIN — OXYCODONE 10 MG: 5 TABLET ORAL at 10:13

## 2023-04-23 RX ADMIN — HYDROMORPHONE HYDROCHLORIDE 2 MG: 1 INJECTION, SOLUTION INTRAMUSCULAR; INTRAVENOUS; SUBCUTANEOUS at 23:56

## 2023-04-23 RX ADMIN — Medication 2000 MG: at 23:51

## 2023-04-23 RX ADMIN — Medication 2000 MG: at 14:49

## 2023-04-23 RX ADMIN — ACETAMINOPHEN 650 MG: 325 TABLET, FILM COATED ORAL at 03:55

## 2023-04-23 RX ADMIN — MIDODRINE HYDROCHLORIDE 5 MG: 5 TABLET ORAL at 11:10

## 2023-04-23 RX ADMIN — HYDROMORPHONE HYDROCHLORIDE 2 MG: 1 INJECTION, SOLUTION INTRAMUSCULAR; INTRAVENOUS; SUBCUTANEOUS at 00:32

## 2023-04-23 RX ADMIN — HYDROMORPHONE HYDROCHLORIDE 2 MG: 1 INJECTION, SOLUTION INTRAMUSCULAR; INTRAVENOUS; SUBCUTANEOUS at 11:10

## 2023-04-23 RX ADMIN — ONDANSETRON 4 MG: 2 INJECTION INTRAMUSCULAR; INTRAVENOUS at 03:54

## 2023-04-23 RX ADMIN — GABAPENTIN 100 MG: 100 CAPSULE ORAL at 21:37

## 2023-04-23 RX ADMIN — HYDROMORPHONE HYDROCHLORIDE 2 MG: 1 INJECTION, SOLUTION INTRAMUSCULAR; INTRAVENOUS; SUBCUTANEOUS at 18:05

## 2023-04-23 RX ADMIN — HYDROMORPHONE HYDROCHLORIDE 2 MG: 1 INJECTION, SOLUTION INTRAMUSCULAR; INTRAVENOUS; SUBCUTANEOUS at 08:29

## 2023-04-23 RX ADMIN — CEFEPIME 2000 MG: 2 INJECTION, POWDER, FOR SOLUTION INTRAVENOUS at 21:50

## 2023-04-23 RX ADMIN — HYDROMORPHONE HYDROCHLORIDE 2 MG: 1 INJECTION, SOLUTION INTRAMUSCULAR; INTRAVENOUS; SUBCUTANEOUS at 14:45

## 2023-04-23 RX ADMIN — MIDODRINE HYDROCHLORIDE 5 MG: 5 TABLET ORAL at 08:33

## 2023-04-23 RX ADMIN — VANCOMYCIN HYDROCHLORIDE 1250 MG: 10 INJECTION, POWDER, LYOPHILIZED, FOR SOLUTION INTRAVENOUS at 08:33

## 2023-04-23 RX ADMIN — SODIUM CHLORIDE 1000 ML: 9 INJECTION, SOLUTION INTRAVENOUS at 07:11

## 2023-04-23 RX ADMIN — Medication 2000 MG: at 07:10

## 2023-04-23 RX ADMIN — KETOROLAC TROMETHAMINE 15 MG: 30 INJECTION, SOLUTION INTRAMUSCULAR; INTRAVENOUS at 11:10

## 2023-04-23 RX ADMIN — GABAPENTIN 100 MG: 100 CAPSULE ORAL at 07:14

## 2023-04-23 ASSESSMENT — PAIN SCALES - GENERAL
PAINLEVEL_OUTOF10: 10
PAINLEVEL_OUTOF10: 9
PAINLEVEL_OUTOF10: 10
PAINLEVEL_OUTOF10: 9
PAINLEVEL_OUTOF10: 10

## 2023-04-23 ASSESSMENT — PAIN DESCRIPTION - ORIENTATION
ORIENTATION: MID
ORIENTATION: RIGHT;LEFT;ANTERIOR;POSTERIOR
ORIENTATION: PROXIMAL

## 2023-04-23 ASSESSMENT — PAIN DESCRIPTION - DESCRIPTORS
DESCRIPTORS: SHARP
DESCRIPTORS: ACHING
DESCRIPTORS: ACHING
DESCRIPTORS: ACHING;SHARP
DESCRIPTORS: DISCOMFORT;CRUSHING
DESCRIPTORS: ACHING
DESCRIPTORS: SHARP
DESCRIPTORS: SHARP
DESCRIPTORS: ACHING
DESCRIPTORS: SHARP
DESCRIPTORS: ACHING;SHARP
DESCRIPTORS: ACHING
DESCRIPTORS: CRUSHING;DISCOMFORT

## 2023-04-23 ASSESSMENT — PAIN - FUNCTIONAL ASSESSMENT
PAIN_FUNCTIONAL_ASSESSMENT: PREVENTS OR INTERFERES SOME ACTIVE ACTIVITIES AND ADLS
PAIN_FUNCTIONAL_ASSESSMENT: ACTIVITIES ARE NOT PREVENTED
PAIN_FUNCTIONAL_ASSESSMENT: PREVENTS OR INTERFERES SOME ACTIVE ACTIVITIES AND ADLS
PAIN_FUNCTIONAL_ASSESSMENT: ACTIVITIES ARE NOT PREVENTED
PAIN_FUNCTIONAL_ASSESSMENT: PREVENTS OR INTERFERES WITH MANY ACTIVE NOT PASSIVE ACTIVITIES
PAIN_FUNCTIONAL_ASSESSMENT: PREVENTS OR INTERFERES WITH ALL ACTIVE AND SOME PASSIVE ACTIVITIES
PAIN_FUNCTIONAL_ASSESSMENT: ACTIVITIES ARE NOT PREVENTED
PAIN_FUNCTIONAL_ASSESSMENT: ACTIVITIES ARE NOT PREVENTED
PAIN_FUNCTIONAL_ASSESSMENT: PREVENTS OR INTERFERES SOME ACTIVE ACTIVITIES AND ADLS
PAIN_FUNCTIONAL_ASSESSMENT: PREVENTS OR INTERFERES SOME ACTIVE ACTIVITIES AND ADLS

## 2023-04-23 ASSESSMENT — PAIN DESCRIPTION - ONSET: ONSET: ON-GOING

## 2023-04-23 ASSESSMENT — PAIN DESCRIPTION - LOCATION
LOCATION: PENIS;SCROTUM
LOCATION: GENERALIZED
LOCATION: PENIS;SCROTUM
LOCATION: GROIN
LOCATION: GROIN
LOCATION: GENERALIZED;PENIS
LOCATION: GENERALIZED;PENIS
LOCATION: PENIS
LOCATION: GROIN

## 2023-04-23 ASSESSMENT — PAIN DESCRIPTION - PAIN TYPE
TYPE: ACUTE PAIN
TYPE: ACUTE PAIN

## 2023-04-23 ASSESSMENT — PAIN DESCRIPTION - FREQUENCY: FREQUENCY: CONTINUOUS

## 2023-04-23 ASSESSMENT — PAIN SCALES - WONG BAKER: WONGBAKER_NUMERICALRESPONSE: 0

## 2023-04-24 LAB
ALBUMIN SERPL-MCNC: 4 G/DL (ref 3.4–5)
ALBUMIN/GLOB SERPL: 1.4 {RATIO} (ref 1.1–2.2)
ALP SERPL-CCNC: 98 U/L (ref 40–129)
ALT SERPL-CCNC: 24 U/L (ref 10–40)
ANION GAP SERPL CALCULATED.3IONS-SCNC: 12 MMOL/L (ref 3–16)
ANISOCYTOSIS BLD QL SMEAR: ABNORMAL
AST SERPL-CCNC: 37 U/L (ref 15–37)
BASOPHILS # BLD: 0 K/UL (ref 0–0.2)
BASOPHILS NFR BLD: 0 %
BILIRUB SERPL-MCNC: 3.7 MG/DL (ref 0–1)
BUN SERPL-MCNC: 8 MG/DL (ref 7–20)
CALCIUM SERPL-MCNC: 9.7 MG/DL (ref 8.3–10.6)
CHLORIDE SERPL-SCNC: 101 MMOL/L (ref 99–110)
CO2 SERPL-SCNC: 23 MMOL/L (ref 21–32)
CREAT SERPL-MCNC: 0.7 MG/DL (ref 0.9–1.3)
DEPRECATED RDW RBC AUTO: 18.1 % (ref 12.4–15.4)
EOSINOPHIL # BLD: 0.2 K/UL (ref 0–0.6)
EOSINOPHIL NFR BLD: 1 %
GFR SERPLBLD CREATININE-BSD FMLA CKD-EPI: >60 ML/MIN/{1.73_M2}
GLUCOSE SERPL-MCNC: 88 MG/DL (ref 70–99)
HCT VFR BLD AUTO: 21.4 % (ref 40.5–52.5)
HCT VFR BLD AUTO: 23.3 % (ref 40.5–52.5)
HGB BLD-MCNC: 7.2 G/DL (ref 13.5–17.5)
HGB BLD-MCNC: 7.6 G/DL (ref 13.5–17.5)
IMMATURE RETIC FRACT: 0.54 (ref 0.21–0.37)
LDH SERPL L TO P-CCNC: 473 U/L (ref 100–190)
LYMPHOCYTES # BLD: 4.9 K/UL (ref 1–5.1)
LYMPHOCYTES NFR BLD: 26 %
MAGNESIUM SERPL-MCNC: 1.9 MG/DL (ref 1.8–2.4)
MCH RBC QN AUTO: 33.4 PG (ref 26–34)
MCHC RBC AUTO-ENTMCNC: 33.9 G/DL (ref 31–36)
MCV RBC AUTO: 98.7 FL (ref 80–100)
MONOCYTES # BLD: 0.7 K/UL (ref 0–1.3)
MONOCYTES NFR BLD: 4 %
NEUTROPHILS # BLD: 12.9 K/UL (ref 1.7–7.7)
NEUTROPHILS NFR BLD: 69 %
NRBC BLD-RTO: 5 /100 WBC
OVALOCYTES BLD QL SMEAR: ABNORMAL
PATH INTERP BLD-IMP: NO
PLATELET # BLD AUTO: 395 K/UL (ref 135–450)
PLATELET BLD QL SMEAR: ADEQUATE
PMV BLD AUTO: 9.1 FL (ref 5–10.5)
POIKILOCYTOSIS BLD QL SMEAR: ABNORMAL
POLYCHROMASIA BLD QL SMEAR: ABNORMAL
POTASSIUM SERPL-SCNC: 4.4 MMOL/L (ref 3.5–5.1)
PROT SERPL-MCNC: 6.9 G/DL (ref 6.4–8.2)
RBC # BLD AUTO: 2.17 M/UL (ref 4.2–5.9)
RETICS # AUTO: 0.07 M/UL
RETICS/RBC NFR AUTO: 3.28 % (ref 0.5–2.18)
SICKLE CELLS BLD QL SMEAR: ABNORMAL
SLIDE REVIEW: ABNORMAL
SODIUM SERPL-SCNC: 136 MMOL/L (ref 136–145)
STOMATOCYTES BLD QL SMEAR: ABNORMAL
TARGETS BLD QL SMEAR: ABNORMAL
VANCOMYCIN SERPL-MCNC: 7.2 UG/ML
WBC # BLD AUTO: 18.7 K/UL (ref 4–11)

## 2023-04-24 PROCEDURE — 6360000002 HC RX W HCPCS: Performed by: INTERNAL MEDICINE

## 2023-04-24 PROCEDURE — 1200000000 HC SEMI PRIVATE

## 2023-04-24 PROCEDURE — 6370000000 HC RX 637 (ALT 250 FOR IP): Performed by: PHYSICIAN ASSISTANT

## 2023-04-24 PROCEDURE — 2580000003 HC RX 258: Performed by: INTERNAL MEDICINE

## 2023-04-24 PROCEDURE — 6360000002 HC RX W HCPCS: Performed by: UROLOGY

## 2023-04-24 PROCEDURE — 80053 COMPREHEN METABOLIC PANEL: CPT

## 2023-04-24 PROCEDURE — 80202 ASSAY OF VANCOMYCIN: CPT

## 2023-04-24 PROCEDURE — 87641 MR-STAPH DNA AMP PROBE: CPT

## 2023-04-24 PROCEDURE — 83615 LACTATE (LD) (LDH) ENZYME: CPT

## 2023-04-24 PROCEDURE — 83735 ASSAY OF MAGNESIUM: CPT

## 2023-04-24 PROCEDURE — 6360000002 HC RX W HCPCS: Performed by: PHYSICIAN ASSISTANT

## 2023-04-24 PROCEDURE — 85045 AUTOMATED RETICULOCYTE COUNT: CPT

## 2023-04-24 PROCEDURE — 3E033XZ INTRODUCTION OF VASOPRESSOR INTO PERIPHERAL VEIN, PERCUTANEOUS APPROACH: ICD-10-PCS | Performed by: UROLOGY

## 2023-04-24 PROCEDURE — 36415 COLL VENOUS BLD VENIPUNCTURE: CPT

## 2023-04-24 PROCEDURE — 6370000000 HC RX 637 (ALT 250 FOR IP): Performed by: INTERNAL MEDICINE

## 2023-04-24 PROCEDURE — 2580000003 HC RX 258: Performed by: PHYSICIAN ASSISTANT

## 2023-04-24 PROCEDURE — 85014 HEMATOCRIT: CPT

## 2023-04-24 PROCEDURE — 2500000003 HC RX 250 WO HCPCS: Performed by: PHYSICIAN ASSISTANT

## 2023-04-24 PROCEDURE — 85025 COMPLETE CBC W/AUTO DIFF WBC: CPT

## 2023-04-24 PROCEDURE — 85018 HEMOGLOBIN: CPT

## 2023-04-24 PROCEDURE — 2580000003 HC RX 258: Performed by: UROLOGY

## 2023-04-24 RX ORDER — OXYCODONE HYDROCHLORIDE 5 MG/1
10 TABLET ORAL
Status: DISCONTINUED | OUTPATIENT
Start: 2023-04-24 | End: 2023-04-28

## 2023-04-24 RX ORDER — LIDOCAINE HYDROCHLORIDE 20 MG/ML
2 INJECTION, SOLUTION EPIDURAL; INFILTRATION; INTRACAUDAL; PERINEURAL ONCE
Status: COMPLETED | OUTPATIENT
Start: 2023-04-24 | End: 2023-04-24

## 2023-04-24 RX ADMIN — GABAPENTIN 100 MG: 100 CAPSULE ORAL at 21:31

## 2023-04-24 RX ADMIN — MIDODRINE HYDROCHLORIDE 5 MG: 5 TABLET ORAL at 16:33

## 2023-04-24 RX ADMIN — HYDROMORPHONE HYDROCHLORIDE 2 MG: 1 INJECTION, SOLUTION INTRAMUSCULAR; INTRAVENOUS; SUBCUTANEOUS at 15:16

## 2023-04-24 RX ADMIN — HYDROXYUREA 1000 MG: 500 CAPSULE ORAL at 09:04

## 2023-04-24 RX ADMIN — SODIUM CHLORIDE: 9 INJECTION, SOLUTION INTRAVENOUS at 06:23

## 2023-04-24 RX ADMIN — HYDROMORPHONE HYDROCHLORIDE 2 MG: 1 INJECTION, SOLUTION INTRAMUSCULAR; INTRAVENOUS; SUBCUTANEOUS at 18:16

## 2023-04-24 RX ADMIN — GABAPENTIN 100 MG: 100 CAPSULE ORAL at 04:57

## 2023-04-24 RX ADMIN — HYDROMORPHONE HYDROCHLORIDE 2 MG: 1 INJECTION, SOLUTION INTRAMUSCULAR; INTRAVENOUS; SUBCUTANEOUS at 09:15

## 2023-04-24 RX ADMIN — PHENYLEPHRINE HYDROCHLORIDE 0.5 MG: 10 INJECTION INTRAVENOUS at 10:23

## 2023-04-24 RX ADMIN — SODIUM CHLORIDE: 9 INJECTION, SOLUTION INTRAVENOUS at 15:19

## 2023-04-24 RX ADMIN — OXYCODONE 10 MG: 5 TABLET ORAL at 23:02

## 2023-04-24 RX ADMIN — HYDROMORPHONE HYDROCHLORIDE 2 MG: 1 INJECTION, SOLUTION INTRAMUSCULAR; INTRAVENOUS; SUBCUTANEOUS at 03:13

## 2023-04-24 RX ADMIN — HYDROMORPHONE HYDROCHLORIDE 2 MG: 1 INJECTION, SOLUTION INTRAMUSCULAR; INTRAVENOUS; SUBCUTANEOUS at 12:16

## 2023-04-24 RX ADMIN — Medication 2000 MG: at 05:01

## 2023-04-24 RX ADMIN — VANCOMYCIN HYDROCHLORIDE 1250 MG: 10 INJECTION, POWDER, LYOPHILIZED, FOR SOLUTION INTRAVENOUS at 00:37

## 2023-04-24 RX ADMIN — OXYCODONE 10 MG: 5 TABLET ORAL at 01:55

## 2023-04-24 RX ADMIN — LIDOCAINE HYDROCHLORIDE 2 ML: 20 INJECTION, SOLUTION EPIDURAL; INFILTRATION; INTRACAUDAL; PERINEURAL at 10:22

## 2023-04-24 RX ADMIN — HYDROMORPHONE HYDROCHLORIDE 2 MG: 1 INJECTION, SOLUTION INTRAMUSCULAR; INTRAVENOUS; SUBCUTANEOUS at 21:41

## 2023-04-24 RX ADMIN — OXYCODONE 10 MG: 5 TABLET ORAL at 04:57

## 2023-04-24 RX ADMIN — VANCOMYCIN HYDROCHLORIDE 1250 MG: 10 INJECTION, POWDER, LYOPHILIZED, FOR SOLUTION INTRAVENOUS at 18:17

## 2023-04-24 RX ADMIN — PHENYLEPHRINE HYDROCHLORIDE 0.5 MG: 10 INJECTION INTRAVENOUS at 10:22

## 2023-04-24 RX ADMIN — HYDROXYUREA 1000 MG: 500 CAPSULE ORAL at 21:31

## 2023-04-24 RX ADMIN — HEPARIN SODIUM 5000 UNITS: 5000 INJECTION INTRAVENOUS; SUBCUTANEOUS at 21:31

## 2023-04-24 RX ADMIN — MIDODRINE HYDROCHLORIDE 5 MG: 5 TABLET ORAL at 09:05

## 2023-04-24 RX ADMIN — CEFEPIME 2000 MG: 2 INJECTION, POWDER, FOR SOLUTION INTRAVENOUS at 09:08

## 2023-04-24 RX ADMIN — GABAPENTIN 100 MG: 100 CAPSULE ORAL at 15:20

## 2023-04-24 RX ADMIN — HYDROMORPHONE HYDROCHLORIDE 2 MG: 1 INJECTION, SOLUTION INTRAMUSCULAR; INTRAVENOUS; SUBCUTANEOUS at 06:15

## 2023-04-24 RX ADMIN — MIDODRINE HYDROCHLORIDE 5 MG: 5 TABLET ORAL at 12:19

## 2023-04-24 RX ADMIN — VANCOMYCIN HYDROCHLORIDE 1250 MG: 10 INJECTION, POWDER, LYOPHILIZED, FOR SOLUTION INTRAVENOUS at 10:28

## 2023-04-24 RX ADMIN — CEFEPIME 2000 MG: 2 INJECTION, POWDER, FOR SOLUTION INTRAVENOUS at 21:36

## 2023-04-24 ASSESSMENT — PAIN DESCRIPTION - DESCRIPTORS
DESCRIPTORS: ACHING;SHARP
DESCRIPTORS: CRUSHING;DISCOMFORT;ACHING
DESCRIPTORS: SHARP;ACHING
DESCRIPTORS: CRUSHING
DESCRIPTORS: ACHING;CRUSHING

## 2023-04-24 ASSESSMENT — PAIN DESCRIPTION - ONSET: ONSET: ON-GOING

## 2023-04-24 ASSESSMENT — PAIN SCALES - GENERAL
PAINLEVEL_OUTOF10: 10
PAINLEVEL_OUTOF10: 9
PAINLEVEL_OUTOF10: 10
PAINLEVEL_OUTOF10: 9
PAINLEVEL_OUTOF10: 9
PAINLEVEL_OUTOF10: 10
PAINLEVEL_OUTOF10: 8

## 2023-04-24 ASSESSMENT — PAIN - FUNCTIONAL ASSESSMENT
PAIN_FUNCTIONAL_ASSESSMENT: PREVENTS OR INTERFERES WITH MANY ACTIVE NOT PASSIVE ACTIVITIES
PAIN_FUNCTIONAL_ASSESSMENT: PREVENTS OR INTERFERES WITH ALL ACTIVE AND SOME PASSIVE ACTIVITIES
PAIN_FUNCTIONAL_ASSESSMENT: PREVENTS OR INTERFERES SOME ACTIVE ACTIVITIES AND ADLS
PAIN_FUNCTIONAL_ASSESSMENT: ACTIVITIES ARE NOT PREVENTED
PAIN_FUNCTIONAL_ASSESSMENT: PREVENTS OR INTERFERES SOME ACTIVE ACTIVITIES AND ADLS
PAIN_FUNCTIONAL_ASSESSMENT: PREVENTS OR INTERFERES WITH ALL ACTIVE AND SOME PASSIVE ACTIVITIES

## 2023-04-24 ASSESSMENT — PAIN DESCRIPTION - LOCATION
LOCATION: GROIN

## 2023-04-24 ASSESSMENT — PAIN DESCRIPTION - ORIENTATION
ORIENTATION: MID
ORIENTATION: MID;INNER
ORIENTATION: MID;INNER

## 2023-04-24 ASSESSMENT — PAIN DESCRIPTION - PAIN TYPE: TYPE: ACUTE PAIN

## 2023-04-24 ASSESSMENT — PAIN DESCRIPTION - FREQUENCY: FREQUENCY: CONTINUOUS

## 2023-04-25 LAB
ANION GAP SERPL CALCULATED.3IONS-SCNC: 11 MMOL/L (ref 3–16)
ANISOCYTOSIS BLD QL SMEAR: ABNORMAL
BASOPHILS # BLD: 0 K/UL (ref 0–0.2)
BASOPHILS NFR BLD: 0 %
BUN SERPL-MCNC: 6 MG/DL (ref 7–20)
CALCIUM SERPL-MCNC: 9.5 MG/DL (ref 8.3–10.6)
CHLORIDE SERPL-SCNC: 102 MMOL/L (ref 99–110)
CO2 SERPL-SCNC: 23 MMOL/L (ref 21–32)
CREAT SERPL-MCNC: 0.6 MG/DL (ref 0.9–1.3)
DEPRECATED RDW RBC AUTO: 18.2 % (ref 12.4–15.4)
EOSINOPHIL # BLD: 0.6 K/UL (ref 0–0.6)
EOSINOPHIL NFR BLD: 4 %
GFR SERPLBLD CREATININE-BSD FMLA CKD-EPI: >60 ML/MIN/{1.73_M2}
GLUCOSE SERPL-MCNC: 107 MG/DL (ref 70–99)
HCT VFR BLD AUTO: 20.7 % (ref 40.5–52.5)
HGB BLD-MCNC: 7 G/DL (ref 13.5–17.5)
LYMPHOCYTES # BLD: 2.9 K/UL (ref 1–5.1)
LYMPHOCYTES NFR BLD: 19 %
MCH RBC QN AUTO: 33.9 PG (ref 26–34)
MCHC RBC AUTO-ENTMCNC: 34.1 G/DL (ref 31–36)
MCV RBC AUTO: 99.4 FL (ref 80–100)
MONOCYTES # BLD: 1.4 K/UL (ref 0–1.3)
MONOCYTES NFR BLD: 9 %
MRSA DNA SPEC QL NAA+PROBE: ABNORMAL
NEUTROPHILS # BLD: 10.3 K/UL (ref 1.7–7.7)
NEUTROPHILS NFR BLD: 68 %
ORGANISM: ABNORMAL
OVALOCYTES BLD QL SMEAR: ABNORMAL
PATH INTERP BLD-IMP: NO
PLATELET # BLD AUTO: 437 K/UL (ref 135–450)
PLATELET BLD QL SMEAR: ADEQUATE
PMV BLD AUTO: 8.4 FL (ref 5–10.5)
POIKILOCYTOSIS BLD QL SMEAR: ABNORMAL
POLYCHROMASIA BLD QL SMEAR: ABNORMAL
POTASSIUM SERPL-SCNC: 4.1 MMOL/L (ref 3.5–5.1)
RBC # BLD AUTO: 2.08 M/UL (ref 4.2–5.9)
SICKLE CELLS BLD QL SMEAR: ABNORMAL
SLIDE REVIEW: ABNORMAL
SODIUM SERPL-SCNC: 136 MMOL/L (ref 136–145)
SPHEROCYTES BLD QL SMEAR: ABNORMAL
STOMATOCYTES BLD QL SMEAR: ABNORMAL
TARGETS BLD QL SMEAR: ABNORMAL
WBC # BLD AUTO: 15.1 K/UL (ref 4–11)

## 2023-04-25 PROCEDURE — 2580000003 HC RX 258: Performed by: INTERNAL MEDICINE

## 2023-04-25 PROCEDURE — 6370000000 HC RX 637 (ALT 250 FOR IP): Performed by: INTERNAL MEDICINE

## 2023-04-25 PROCEDURE — 6370000000 HC RX 637 (ALT 250 FOR IP): Performed by: NURSE PRACTITIONER

## 2023-04-25 PROCEDURE — 80048 BASIC METABOLIC PNL TOTAL CA: CPT

## 2023-04-25 PROCEDURE — 1200000000 HC SEMI PRIVATE

## 2023-04-25 PROCEDURE — 6370000000 HC RX 637 (ALT 250 FOR IP): Performed by: PHYSICIAN ASSISTANT

## 2023-04-25 PROCEDURE — 6360000002 HC RX W HCPCS: Performed by: INTERNAL MEDICINE

## 2023-04-25 PROCEDURE — 85025 COMPLETE CBC W/AUTO DIFF WBC: CPT

## 2023-04-25 PROCEDURE — 36415 COLL VENOUS BLD VENIPUNCTURE: CPT

## 2023-04-25 RX ADMIN — VANCOMYCIN HYDROCHLORIDE 1250 MG: 10 INJECTION, POWDER, LYOPHILIZED, FOR SOLUTION INTRAVENOUS at 02:42

## 2023-04-25 RX ADMIN — VANCOMYCIN HYDROCHLORIDE 1250 MG: 10 INJECTION, POWDER, LYOPHILIZED, FOR SOLUTION INTRAVENOUS at 08:47

## 2023-04-25 RX ADMIN — MIDODRINE HYDROCHLORIDE 5 MG: 5 TABLET ORAL at 17:30

## 2023-04-25 RX ADMIN — SODIUM CHLORIDE, PRESERVATIVE FREE 10 ML: 5 INJECTION INTRAVENOUS at 08:02

## 2023-04-25 RX ADMIN — CEFEPIME 2000 MG: 2 INJECTION, POWDER, FOR SOLUTION INTRAVENOUS at 20:22

## 2023-04-25 RX ADMIN — SODIUM CHLORIDE: 9 INJECTION, SOLUTION INTRAVENOUS at 02:42

## 2023-04-25 RX ADMIN — OXYCODONE 10 MG: 5 TABLET ORAL at 23:02

## 2023-04-25 RX ADMIN — MIDODRINE HYDROCHLORIDE 5 MG: 5 TABLET ORAL at 07:54

## 2023-04-25 RX ADMIN — HYDROMORPHONE HYDROCHLORIDE 2 MG: 1 INJECTION, SOLUTION INTRAMUSCULAR; INTRAVENOUS; SUBCUTANEOUS at 07:51

## 2023-04-25 RX ADMIN — HYDROMORPHONE HYDROCHLORIDE 2 MG: 1 INJECTION, SOLUTION INTRAMUSCULAR; INTRAVENOUS; SUBCUTANEOUS at 20:57

## 2023-04-25 RX ADMIN — HYDROMORPHONE HYDROCHLORIDE 2 MG: 1 INJECTION, SOLUTION INTRAMUSCULAR; INTRAVENOUS; SUBCUTANEOUS at 04:27

## 2023-04-25 RX ADMIN — VANCOMYCIN HYDROCHLORIDE 1250 MG: 10 INJECTION, POWDER, LYOPHILIZED, FOR SOLUTION INTRAVENOUS at 17:30

## 2023-04-25 RX ADMIN — GABAPENTIN 100 MG: 100 CAPSULE ORAL at 21:01

## 2023-04-25 RX ADMIN — HYDROMORPHONE HYDROCHLORIDE 2 MG: 1 INJECTION, SOLUTION INTRAMUSCULAR; INTRAVENOUS; SUBCUTANEOUS at 14:23

## 2023-04-25 RX ADMIN — HYDROXYUREA 1000 MG: 500 CAPSULE ORAL at 07:58

## 2023-04-25 RX ADMIN — HYDROMORPHONE HYDROCHLORIDE 2 MG: 1 INJECTION, SOLUTION INTRAMUSCULAR; INTRAVENOUS; SUBCUTANEOUS at 17:30

## 2023-04-25 RX ADMIN — MIDODRINE HYDROCHLORIDE 5 MG: 5 TABLET ORAL at 12:19

## 2023-04-25 RX ADMIN — MUPIROCIN: 20 OINTMENT TOPICAL at 07:53

## 2023-04-25 RX ADMIN — OXYCODONE 10 MG: 5 TABLET ORAL at 02:47

## 2023-04-25 RX ADMIN — HYDROMORPHONE HYDROCHLORIDE 2 MG: 1 INJECTION, SOLUTION INTRAMUSCULAR; INTRAVENOUS; SUBCUTANEOUS at 01:05

## 2023-04-25 RX ADMIN — HYDROXYUREA 1000 MG: 500 CAPSULE ORAL at 20:58

## 2023-04-25 RX ADMIN — GABAPENTIN 100 MG: 100 CAPSULE ORAL at 14:23

## 2023-04-25 RX ADMIN — OXYCODONE 10 MG: 5 TABLET ORAL at 08:46

## 2023-04-25 RX ADMIN — CEFEPIME 2000 MG: 2 INJECTION, POWDER, FOR SOLUTION INTRAVENOUS at 07:56

## 2023-04-25 RX ADMIN — HEPARIN SODIUM 5000 UNITS: 5000 INJECTION INTRAVENOUS; SUBCUTANEOUS at 21:02

## 2023-04-25 RX ADMIN — GABAPENTIN 100 MG: 100 CAPSULE ORAL at 06:59

## 2023-04-25 RX ADMIN — HYDROMORPHONE HYDROCHLORIDE 2 MG: 1 INJECTION, SOLUTION INTRAMUSCULAR; INTRAVENOUS; SUBCUTANEOUS at 10:39

## 2023-04-25 RX ADMIN — OXYCODONE 10 MG: 5 TABLET ORAL at 12:19

## 2023-04-25 ASSESSMENT — PAIN SCALES - GENERAL
PAINLEVEL_OUTOF10: 10
PAINLEVEL_OUTOF10: 10
PAINLEVEL_OUTOF10: 9
PAINLEVEL_OUTOF10: 8
PAINLEVEL_OUTOF10: 10
PAINLEVEL_OUTOF10: 9
PAINLEVEL_OUTOF10: 9
PAINLEVEL_OUTOF10: 10
PAINLEVEL_OUTOF10: 10
PAINLEVEL_OUTOF10: 9
PAINLEVEL_OUTOF10: 9
PAINLEVEL_OUTOF10: 8
PAINLEVEL_OUTOF10: 9

## 2023-04-25 ASSESSMENT — PAIN - FUNCTIONAL ASSESSMENT
PAIN_FUNCTIONAL_ASSESSMENT: ACTIVITIES ARE NOT PREVENTED

## 2023-04-25 ASSESSMENT — PAIN DESCRIPTION - LOCATION
LOCATION: GROIN
LOCATION: PENIS;PERINEUM;SCROTUM
LOCATION: GROIN

## 2023-04-25 ASSESSMENT — PAIN SCALES - WONG BAKER
WONGBAKER_NUMERICALRESPONSE: 0

## 2023-04-25 ASSESSMENT — PAIN DESCRIPTION - DESCRIPTORS
DESCRIPTORS: SHARP;ACHING
DESCRIPTORS: ACHING;SHARP
DESCRIPTORS: ACHING
DESCRIPTORS: ACHING;SHARP

## 2023-04-26 ENCOUNTER — APPOINTMENT (OUTPATIENT)
Dept: GENERAL RADIOLOGY | Age: 23
DRG: 812 | End: 2023-04-26
Attending: INTERNAL MEDICINE
Payer: COMMERCIAL

## 2023-04-26 LAB
ANION GAP SERPL CALCULATED.3IONS-SCNC: 14 MMOL/L (ref 3–16)
ANISOCYTOSIS BLD QL SMEAR: ABNORMAL
BACTERIA BLD CULT ORG #2: NORMAL
BACTERIA BLD CULT: NORMAL
BASOPHILS # BLD: 0 K/UL (ref 0–0.2)
BASOPHILS NFR BLD: 0 %
BUN SERPL-MCNC: 6 MG/DL (ref 7–20)
BURR CELLS BLD QL SMEAR: ABNORMAL
CALCIUM SERPL-MCNC: 9.9 MG/DL (ref 8.3–10.6)
CHLORIDE SERPL-SCNC: 99 MMOL/L (ref 99–110)
CO2 SERPL-SCNC: 20 MMOL/L (ref 21–32)
CREAT SERPL-MCNC: <0.5 MG/DL (ref 0.9–1.3)
DEPRECATED RDW RBC AUTO: 18.3 % (ref 12.4–15.4)
EOSINOPHIL # BLD: 0.2 K/UL (ref 0–0.6)
EOSINOPHIL NFR BLD: 1 %
GFR SERPLBLD CREATININE-BSD FMLA CKD-EPI: >60 ML/MIN/{1.73_M2}
GLUCOSE SERPL-MCNC: 129 MG/DL (ref 70–99)
HCT VFR BLD AUTO: 21.4 % (ref 40.5–52.5)
HGB BLD-MCNC: 7.1 G/DL (ref 13.5–17.5)
LYMPHOCYTES # BLD: 1.7 K/UL (ref 1–5.1)
LYMPHOCYTES NFR BLD: 11 %
MACROCYTES BLD QL SMEAR: ABNORMAL
MCH RBC QN AUTO: 34.1 PG (ref 26–34)
MCHC RBC AUTO-ENTMCNC: 33.4 G/DL (ref 31–36)
MCV RBC AUTO: 102.1 FL (ref 80–100)
MICROCYTES BLD QL SMEAR: ABNORMAL
MONOCYTES # BLD: 1.2 K/UL (ref 0–1.3)
MONOCYTES NFR BLD: 8 %
NEUTROPHILS # BLD: 12 K/UL (ref 1.7–7.7)
NEUTROPHILS NFR BLD: 79 %
NEUTS BAND NFR BLD MANUAL: 1 % (ref 0–7)
NEUTS HYPERSEG BLD QL SMEAR: PRESENT
NRBC BLD-RTO: 2 /100 WBC
OVALOCYTES BLD QL SMEAR: ABNORMAL
PATH INTERP BLD-IMP: YES
PLATELET # BLD AUTO: 455 K/UL (ref 135–450)
PLATELET BLD QL SMEAR: ABNORMAL
PMV BLD AUTO: 8.1 FL (ref 5–10.5)
POLYCHROMASIA BLD QL SMEAR: ABNORMAL
POTASSIUM SERPL-SCNC: 4.5 MMOL/L (ref 3.5–5.1)
RBC # BLD AUTO: 2.09 M/UL (ref 4.2–5.9)
SICKLE CELLS BLD QL SMEAR: ABNORMAL
SLIDE REVIEW: ABNORMAL
SODIUM SERPL-SCNC: 133 MMOL/L (ref 136–145)
SPHEROCYTES BLD QL SMEAR: ABNORMAL
TARGETS BLD QL SMEAR: ABNORMAL
VANCOMYCIN SERPL-MCNC: 8.8 UG/ML
WBC # BLD AUTO: 15 K/UL (ref 4–11)

## 2023-04-26 PROCEDURE — 6370000000 HC RX 637 (ALT 250 FOR IP): Performed by: INTERNAL MEDICINE

## 2023-04-26 PROCEDURE — 6360000002 HC RX W HCPCS: Performed by: INTERNAL MEDICINE

## 2023-04-26 PROCEDURE — 71046 X-RAY EXAM CHEST 2 VIEWS: CPT

## 2023-04-26 PROCEDURE — 80048 BASIC METABOLIC PNL TOTAL CA: CPT

## 2023-04-26 PROCEDURE — 2580000003 HC RX 258: Performed by: INTERNAL MEDICINE

## 2023-04-26 PROCEDURE — 85025 COMPLETE CBC W/AUTO DIFF WBC: CPT

## 2023-04-26 PROCEDURE — 36415 COLL VENOUS BLD VENIPUNCTURE: CPT

## 2023-04-26 PROCEDURE — 1200000000 HC SEMI PRIVATE

## 2023-04-26 PROCEDURE — 80202 ASSAY OF VANCOMYCIN: CPT

## 2023-04-26 RX ADMIN — GABAPENTIN 100 MG: 100 CAPSULE ORAL at 14:33

## 2023-04-26 RX ADMIN — VANCOMYCIN HYDROCHLORIDE 1250 MG: 10 INJECTION, POWDER, LYOPHILIZED, FOR SOLUTION INTRAVENOUS at 14:35

## 2023-04-26 RX ADMIN — CEFEPIME 2000 MG: 2 INJECTION, POWDER, FOR SOLUTION INTRAVENOUS at 09:53

## 2023-04-26 RX ADMIN — HYDROMORPHONE HYDROCHLORIDE 2 MG: 1 INJECTION, SOLUTION INTRAMUSCULAR; INTRAVENOUS; SUBCUTANEOUS at 22:14

## 2023-04-26 RX ADMIN — HYDROXYUREA 1000 MG: 500 CAPSULE ORAL at 09:47

## 2023-04-26 RX ADMIN — MIDODRINE HYDROCHLORIDE 5 MG: 5 TABLET ORAL at 09:47

## 2023-04-26 RX ADMIN — VANCOMYCIN HYDROCHLORIDE 1250 MG: 10 INJECTION, POWDER, LYOPHILIZED, FOR SOLUTION INTRAVENOUS at 22:14

## 2023-04-26 RX ADMIN — MUPIROCIN: 20 OINTMENT TOPICAL at 09:48

## 2023-04-26 RX ADMIN — HYDROMORPHONE HYDROCHLORIDE 2 MG: 1 INJECTION, SOLUTION INTRAMUSCULAR; INTRAVENOUS; SUBCUTANEOUS at 00:02

## 2023-04-26 RX ADMIN — GABAPENTIN 100 MG: 100 CAPSULE ORAL at 22:08

## 2023-04-26 RX ADMIN — GABAPENTIN 100 MG: 100 CAPSULE ORAL at 05:48

## 2023-04-26 RX ADMIN — SODIUM CHLORIDE, PRESERVATIVE FREE 10 ML: 5 INJECTION INTRAVENOUS at 12:44

## 2023-04-26 RX ADMIN — MIDODRINE HYDROCHLORIDE 5 MG: 5 TABLET ORAL at 12:42

## 2023-04-26 RX ADMIN — SODIUM CHLORIDE: 9 INJECTION, SOLUTION INTRAVENOUS at 09:52

## 2023-04-26 RX ADMIN — HYDROMORPHONE HYDROCHLORIDE 2 MG: 1 INJECTION, SOLUTION INTRAMUSCULAR; INTRAVENOUS; SUBCUTANEOUS at 19:14

## 2023-04-26 RX ADMIN — HYDROMORPHONE HYDROCHLORIDE 2 MG: 1 INJECTION, SOLUTION INTRAMUSCULAR; INTRAVENOUS; SUBCUTANEOUS at 09:40

## 2023-04-26 RX ADMIN — SODIUM CHLORIDE: 9 INJECTION, SOLUTION INTRAVENOUS at 22:12

## 2023-04-26 RX ADMIN — CEFEPIME 2000 MG: 2 INJECTION, POWDER, FOR SOLUTION INTRAVENOUS at 21:18

## 2023-04-26 RX ADMIN — HYDROMORPHONE HYDROCHLORIDE 2 MG: 1 INJECTION, SOLUTION INTRAMUSCULAR; INTRAVENOUS; SUBCUTANEOUS at 15:44

## 2023-04-26 RX ADMIN — HYDROXYUREA 1000 MG: 500 CAPSULE ORAL at 21:14

## 2023-04-26 RX ADMIN — VANCOMYCIN HYDROCHLORIDE 1250 MG: 10 INJECTION, POWDER, LYOPHILIZED, FOR SOLUTION INTRAVENOUS at 03:16

## 2023-04-26 RX ADMIN — HYDROMORPHONE HYDROCHLORIDE 2 MG: 1 INJECTION, SOLUTION INTRAMUSCULAR; INTRAVENOUS; SUBCUTANEOUS at 03:17

## 2023-04-26 RX ADMIN — MIDODRINE HYDROCHLORIDE 5 MG: 5 TABLET ORAL at 19:13

## 2023-04-26 RX ADMIN — HYDROMORPHONE HYDROCHLORIDE 2 MG: 1 INJECTION, SOLUTION INTRAMUSCULAR; INTRAVENOUS; SUBCUTANEOUS at 12:42

## 2023-04-26 RX ADMIN — HYDROMORPHONE HYDROCHLORIDE 2 MG: 1 INJECTION, SOLUTION INTRAMUSCULAR; INTRAVENOUS; SUBCUTANEOUS at 06:31

## 2023-04-26 ASSESSMENT — PAIN DESCRIPTION - DESCRIPTORS
DESCRIPTORS: ACHING;DISCOMFORT;DULL
DESCRIPTORS: ACHING;SHARP
DESCRIPTORS: ACHING
DESCRIPTORS: ACHING;SHARP
DESCRIPTORS: ACHING;SHARP

## 2023-04-26 ASSESSMENT — PAIN DESCRIPTION - LOCATION
LOCATION: GROIN
LOCATION: GROIN
LOCATION: PENIS;PERINEUM;SCROTUM
LOCATION: GROIN

## 2023-04-26 ASSESSMENT — PAIN DESCRIPTION - ORIENTATION: ORIENTATION: MID

## 2023-04-26 ASSESSMENT — PAIN SCALES - GENERAL
PAINLEVEL_OUTOF10: 9
PAINLEVEL_OUTOF10: 10
PAINLEVEL_OUTOF10: 9
PAINLEVEL_OUTOF10: 9
PAINLEVEL_OUTOF10: 10
PAINLEVEL_OUTOF10: 9
PAINLEVEL_OUTOF10: 9
PAINLEVEL_OUTOF10: 10

## 2023-04-26 ASSESSMENT — PAIN SCALES - WONG BAKER
WONGBAKER_NUMERICALRESPONSE: 0
WONGBAKER_NUMERICALRESPONSE: 0

## 2023-04-26 ASSESSMENT — PAIN DESCRIPTION - PAIN TYPE: TYPE: ACUTE PAIN

## 2023-04-26 NOTE — PROGRESS NOTES
time of 1945 with strategic. Talked to Reggie diaz at [address] team. Filippo Barcenas report about pt. Informed of  time.
4 Eyes Skin Assessment     The patient is being assess for   Admission    I agree that 2 RN's have performed a thorough Head to Toe Skin Assessment on the patient. ALL assessment sites listed below have been assessed. Areas assessed for pressure by both nurses:   [x]   Head, Face, and Ears   [x]   Shoulders, Back, and Chest, Abdomen  [x]   Arms, Elbows, and Hands   [x]   Coccyx, Sacrum, and Ischium  [x]   Legs, Feet, and Heels             **SHARE this note so that the co-signing nurse is able to place an eSignature**    Co-signer eSignature: {Esignature:[id number]}    Does the Patient have Skin Breakdown related to pressure?   No     (Insert Photo here***)         Shaun Prevention initiated:  No   Wound Care Orders initiated:  No      WOC nurse consulted for Pressure Injury (Stage 3,4, Unstageable, DTI, NWPT, Complex wounds)and New or Established Ostomies:  NA      Primary Nurse eSignature: Electronically signed by Farzana Miller RN on 4/19/23 at 2:07 AM EDT
Admitted to [place] [address] at this time. A/o; pleasant and cooperative. Enquiring what and when he can have for pain. Requested box lunch and OJ. Watching TV. IV infusing w/o diff, call light in reach.
Blood infusing w/o diff. No s/s allergic reaction noted. Medicated for pain; call light in reach.
Blood transfusion complete. Alphonso well; watching TV; medicated for pain. Call light in reach.
Blood transfusion started at this time. Temp 100.1; PO Tylenol 650 mg given. Denies feeling sob, denies burning w/urination. States he is feeling hot/cold chills. Discussed s/s allergic reaction; voiced understanding. Appearing cheerful and even laughing at this time as he watches TV.   Nurse remains at bedside per blood transfusion protocol
C/o groin pain rate as 10/1-. Requesting pain medication. Dilaudid 1 mg IV given. See mar.
C/o groin pain rate as 10/10. Requesting pain medication. Oxycodone 2 tablet po given. See mar.
C/o groin pain rate as 9/10. Requesting pain medication. Dilaudid 0.5 mg IV given. See mar.
C/o lower back pain rate as 7/10. Requesting pain medication. Dilaudid 1 mg IV given. See mar.
C/o lower back pain rate as 9/10. Requesting pain medication. Dilaudid 0.5 mg IV given. See mar.
C/o lower back pain rate as 9/10. Requesting pain medication. Oxycodone 10 mg PO given.  See mar
C/o lower back pain rate as 9/10. Requesting pain medication. Oxycodone 20 mg PO given. See mar.
C/o penis pain rate as 10/10. Requesting pain medication. Dilaudi 1 mg IV given. See mar.
C/o penis pain rate as 10/10. Requesting pain medication. Oxycodone 2 tablets po given. See mar.
C/o penis pain rate as 10/10. Requesting pain medication. Oxycodone given. See mar.
Called [facility] for  time. Talked to [place] and she states that they are still working on getting a  time for pt. Explained that Dr. Terry Gustafson stated that this was top of priority.
Called consult to urology @ [phone] 4/20/2023 evert jacobs
Called out stating he was experiencing priapism. States that his pain is a 10 and that the oxycodone may help but what he usually does is jog to get his HR up and then walks until it goes away. States he has the priapism almost every morning. He is standing up by his bed; tele showing ST;  to 150's. Contacted Dr Sophia Jernigan; new orders given.
Called report to [phone] [facility] at [facility] on [address]. Informed of  time of 1945 and explained that pt needs to go thru ER and directly to OR.
Dr. Ezra Chacon returned call regarding pt being transferred to [facility]. MD states it is being set up now. Waiting for transfer center to call with time. Ordered to give 1 mg of dilaudid when pt is d/c.
EMS stragic here to take pt to [facility]. Pt given pain medication less than 10 minutes ago. EMS updated with report about pt at this time. Pt in stable condition at this time. Report already given to [facility] surgery team and floor RN by Previous RN.
IM Progress Note    Admit Date:  4/18/2023  2    Interval history:  sickle cell crisis with priapism    Subjective:    Mr. Parish Lines seen up in bed, still in pain and wants phenylephrine shot in penis given >24 hrs of priapism  Still undecided about surgery recommended by urology  No chest pain    S.p 1 unit transfusion , low grade fevers during transfusion resolved      Objective:   /70   Pulse 75   Temp 97.9 °F (36.6 °C) (Oral)   Resp 14   Ht 5' 9\" (1.753 m)   Wt 194 lb 14.4 oz (88.4 kg)   SpO2 92%   BMI 28.78 kg/m²     Intake/Output Summary (Last 24 hours) at 4/20/2023 1928  Last data filed at 4/20/2023 1838  Gross per 24 hour   Intake 2265.04 ml   Output 3025 ml   Net -759.96 ml         Physical Exam:      General: young AA male, sitting up in bed    Awake, alert and oriented. Appears to be not in any distress  Mucous Membranes:  Pink , anicteric  Neck: No JVD, no carotid bruit, no thyromegaly  Chest:  Clear to auscultation bilaterally, no added sounds  Cardiovascular:  RRR S1S2 heard, no murmurs or gallops  Abdomen:  Soft, undistended, non tender, no organomegaly, BS present  Extremities: erected penis hard and  tender   No edema or cyanosis.  Distal pulses well felt  Neurological : grossly normal ,, mood sad    Medications:   Scheduled Medications:    hydroxyurea  1,000 mg Oral BID    sodium chloride flush  5-40 mL IntraVENous 2 times per day    gabapentin  100 mg Oral 3 times per day     I   sodium chloride      sodium chloride 100 mL/hr at 04/20/23 0013    sodium chloride       HYDROmorphone, oxyCODONE, sodium chloride flush, sodium chloride, ondansetron **OR** ondansetron, polyethylene glycol, acetaminophen **OR** acetaminophen, potassium chloride **OR** potassium alternative oral replacement **OR** potassium chloride, magnesium sulfate, sodium chloride    Lab Data:  Recent Labs     04/18/23  1404 04/19/23  0747 04/20/23  0415   WBC 20.8* 15.3* 11.7*   HGB 8.5* 7.0* 8.2*   HCT 24.2*  23.3* 19.7*
Ice pack to penis. Watching TV; enquiring when next dose of pain medication is due; wrote info on white board. Call light in reach.
Informed pt of  time and the he is going straight to OR once he arrives to [facility]. Verbalized understanding.
Informed pt that Dr. Gwendloyn Lombard has no new orders and to just continue pain medication, ice packs and blood transfusion.
Jadiel LUA called back regarding that Dr. Michael Camarena cancelled the urology consult. Perfect served Dr. Michael Camarena regarding suggestions for pts' erection.
Lab called and since pt has Sickle cell we are waiting on blood to arrive.
No new orders at present time.
No s/s allergic reaction; blood infusing w/o diff. Rate increased to 150 ml/hr; instructed to call with s/s allergic reaction; voiced understanding. Call light in reach.
Patient woke up while writer was in the room and requested pain medication. See flow sheet, call light in reach.
Physician Progress Note      PATIENT:               Khoi Orozco  CSN #:                  [id number]  :                       2000  ADMIT DATE:       2023 1:44 PM  Rodrigo Garcia DATE:        2023 8:39 PM  RESPONDING  PROVIDER #:        Ketty Hernandez MD          QUERY TEXT:    Pt with pulmonary emboli, noncompliant with Eliquis. PE on history on   2023. If possible, please document in progress notes and discharge   summary if you are evaluating and/or treating any of the following: The medical record reflects the following:  Risk Factors: SS, hemolytic anemia, med noncompliance  Clinical Indicators: Pulmonary emboli, noncompliant with Eliquis, CT chest-No   evidence of pulmonary embolism or acute pulmonary abnormality however small   emboli more distally cannot be excluded due to suboptimal pulmonary, PE on   2023  arterial opacification  Treatment: CT chest, Eliquis, supportive care    Thank you,  Lynn Alexis RN,BSN,CCDS,CRCR  Options provided:  -- Acute pulmonary embolism  -- Recurrent acute pulmonary embolism  -- Chronic pulmonary embolism  -- Other - I will add my own diagnosis  -- Disagree - Not applicable / Not valid  -- Disagree - Clinically unable to determine / Unknown  -- Refer to Clinical Documentation Reviewer    PROVIDER RESPONSE TEXT:    No current PE    Query created by: Jm Serrato on 2023 2:07 PM      Electronically signed by:   Ketty Hernandez MD 2023 9:07 PM
Physician Progress Note      PATIENT:               Kobe Mccall  [facility] #:                  [id number]  :                       2000  ADMIT DATE:       2023 1:44 PM  [address] [place] DATE:        2023 8:39 PM  RESPONDING  PROVIDER #:        Nicole Starr          QUERY TEXT:    Pt with pulmonary emboli, noncompliant with Eliquis. PE on history on   2023. If possible, please document in progress notes and discharge   summary if you are evaluating and/or treating any of the following: The medical record reflects the following:  Risk Factors: SS, hemolytic anemia, med noncompliance  Clinical Indicators: Pulmonary emboli, noncompliant with Eliquis, CT chest-No   evidence of pulmonary embolism or acute pulmonary abnormality however small   emboli more distally cannot be excluded due to suboptimal pulmonary, PE on   2023  arterial opacification  Treatment: CT chest, Eliquis, supportive care    Thank you,  Lynn Alexis RN,BSN,CCDS,CRCR  Options provided:  -- Acute pulmonary embolism  -- Recurrent acute pulmonary embolism  -- Chronic pulmonary embolism  -- Other - I will add my own diagnosis  -- Disagree - Not applicable / Not valid  -- Disagree - Clinically unable to determine / Unknown  -- Refer to Clinical Documentation Reviewer    PROVIDER RESPONSE TEXT:    Provider is clinically unable to determine a response to this query.   defer to provider -Dr. Lisbeth Menon created by: Kenny English on 2023 10:26 AM      Electronically signed by:  Nicole Starr 2023 1:12 PM
Pt asking about sudafed for the erection. Asked Dr. Michael Camarena for order. Noted that sudafed does not work that he needs a blood transfusion. Waiting for the blood thru blood bank. Pt wanting this writer to call urology and ask when they are going to come and see pt. Left message for Jadiel LUA.
Pt complaining of penis pain rate as 10/10 as he is playing a game on cellphone. Requesting pain medication. Oxycodone given. See mar.
Pt ordered to be NPO. Removed breakfast tray. Pt has not eaten breakfast yet. Liquids removed. Pt resting in bed with eyes closed the complete time this writer was removing food and liquids from bedside.
Pt sitting in bed dosing off and on. Pt asking for oxycodone. Explained that medication not available at present time. Times written on white board in room. Will follow.
Pt still has an erection and states that it is no better since the injections. Pt wants to talk to urology. Informed Dr. Gwendloyn Lombard of no result. Page placed to Eulalio LUA.
Pt still has erection after getting Phenylephine injection earlier. V.s.s.
Resting in bed awake. C/o pain rate as 8/10. Am assessment complete. Alert and oriented. Call light in reach. Patient is able to demonstrate the ability to move from a reclining position to an upright position within the recliner. Bedside Mobility Assessment Tool (BMAT):     Assessment Level 1- Sit and Shake    1. From a semi-reclined position, ask patient to sit up and rotate to a seated position at the side of the bed. Can use the bedrail. 2. Ask patient to reach out and grab your hand and shake making sure patient reaches across his/her midline. Pass- Patient is able to come to a seated position, maintain core strength. Maintains seated balance while reaching across midline. Move on to Assessment Level 2. Assessment Level 2- Stretch and Point   1. With patient in seated position at the side of the bed, have patient place both feet on the floor (or stool) with knees no higher than hips. 2. Ask patient to stretch one leg and straighten the knee, then bend the ankle/flex and point the toes. If appropriate, repeat with the other leg. Pass- Patient is able to demonstrate appropriate quad strength on intended weight bearing limb(s). Move onto Assessment Level 3. Assessment Level 3- Stand   1. Ask patient to elevate off the bed or chair (seated to standing) using an assistive device (cane, bedrail). 2. Patient should be able to raise buttocks off be and hold for a count of five. May repeat once. Pass- Patient maintains standing stability for at least 5 seconds, proceed to assessment level 4. Assessment Level 4- Walk   1. Ask patient to march in place at bedside. 2. Then ask patient to advance step and return each foot. Some medical conditions may render a patient from stepping backwards, use your best clinical judgement.    Pass- Patient demonstrates balance while shifting weight and ability to step, takes independent steps, does not use assistive device patient is MOBILITY LEVEL
Shift assessment complete; see flow sheet. Scheduled medications administered; See MAR. IV infusing without difficulty. Pt c/o groin pain 10/10 PRN Dilaudid given at this time. Pt denies any needs at this time.  Pt educated on use of call light and to call out with needs, verbalized understanding, bed in low locked position for pt safety
Shift report given to Jerome Lawton RN.   Patient is sleeping, call light in chivo ch
Shift report given to ST OSIEL RN. Patient resting w/eyes closed; given pain medication as requested; see flow sheet. IV infusing w/o diff, call light in reach.
Shift report received from Joe Addison [place]. Patient up in room; IV infusing w/o diff, call light at bedside.
Sleeping; respirs e/e, call light in reach.
Spoke with Dr Ish Granados who stated to apply ice pack and that he would see patient later today. Patient already has ice pack on and as he was crying out in pain. Back in bed; tele showing NSR; HR 80.   Call light in reach
States that his priapism is not improving with ice packs. Suggested that he could take a brisk walk around the unit to help but he states he does not want to do so. Hob up, resting w/ eyes closed. Medicated for pain. Call light in reach.
Swati Mares called back regarding pt's condition. She is going to text Dr. Roberto Mcdowell regarding condition.
Tearful stating he wants to see the urologist.  Informed that urology was already consulted and has decided that a visit was not necessary. States his priaprism has bothered him all day. Reminded that he has been receiving pain medication all day. Offered ice which he declined at this time. Call light in reach.
Transfer center called. Pt is going to C3 room 329 bed 1. Report to call to is [phone]. Transfer paper signed by pt.
from stepping backwards, use your best clinical judgement. Pass- Patient demonstrates balance while shifting weight and ability to step, takes independent steps, does not use assistive device patient is MOBILITY LEVEL 4.       Mobility Level- 4
the last 72 hours. Coagulation:   Lab Results   Component Value Date/Time    INR 1.25 02/17/2023 03:05 AM    APTT 31.2 02/17/2023 03:05 AM     Cardiac markers:   Lab Results   Component Value Date/Time    CKTOTAL 101 05/03/2021 01:26 AM    TROPONINI <0.01 02/22/2023 08:13 AM         Lab Results   Component Value Date    ALT 16 04/18/2023    AST 36 04/18/2023    ALKPHOS 84 04/18/2023    BILITOT 5.1 (H) 04/18/2023       Lab Results   Component Value Date    INR 1.25 (H) 02/17/2023    INR 1.24 (H) 08/28/2022    INR 1.26 (H) 09/28/2021    PROTIME 15.5 (H) 02/17/2023    PROTIME 15.5 (H) 08/28/2022    PROTIME 14.4 (H) 09/28/2021       Radiology    Radiology:   CT CHEST PULMONARY EMBOLISM W CONTRAST   Final Result   No evidence of pulmonary embolism or acute pulmonary abnormality however   small emboli more distally cannot be excluded due to suboptimal pulmonary   arterial opacification. Fairly marked bilateral gynecomastia. RECOMMENDATIONS:   Unavailable           XR CHEST PORTABLE   Final Result   No acute cardiopulmonary findings                 ASSESSMENT and PLAN    Sickle cell pain crisis and priapism -   Admitted with uncontrolled generalized pain , priapism resolving   Pain controlled   Continue supportive care with IVF, IV narcotics and wean as able  Cut down pain meds for drowsiness    Hemolytic anemia- sec to above  Hb 8.5>7  For transfusion today   Need to reestablish care with hematology     Pulmonary emboli, noncompliant with Eliquis- resumed     DVT Prophylaxis: Eliquis  Diet: ADULT DIET;  Regular  Code Status: Full Code     Jeff Rey MD, 4/19/2023 7:20 AM

## 2023-04-27 LAB
ANION GAP SERPL CALCULATED.3IONS-SCNC: 11 MMOL/L (ref 3–16)
BASOPHILS # BLD: 0.1 K/UL (ref 0–0.2)
BASOPHILS NFR BLD: 0.6 %
BLOOD BANK DISPENSE STATUS: NORMAL
BLOOD BANK DISPENSE STATUS: NORMAL
BLOOD BANK PRODUCT CODE: NORMAL
BLOOD BANK PRODUCT CODE: NORMAL
BPU ID: NORMAL
BPU ID: NORMAL
BUN SERPL-MCNC: 8 MG/DL (ref 7–20)
CALCIUM SERPL-MCNC: 9.5 MG/DL (ref 8.3–10.6)
CHLORIDE SERPL-SCNC: 103 MMOL/L (ref 99–110)
CO2 SERPL-SCNC: 22 MMOL/L (ref 21–32)
CREAT SERPL-MCNC: 0.6 MG/DL (ref 0.9–1.3)
DEPRECATED RDW RBC AUTO: 19.2 % (ref 12.4–15.4)
DESCRIPTION BLOOD BANK: NORMAL
DESCRIPTION BLOOD BANK: NORMAL
EOSINOPHIL # BLD: 0.3 K/UL (ref 0–0.6)
EOSINOPHIL NFR BLD: 2.1 %
GFR SERPLBLD CREATININE-BSD FMLA CKD-EPI: >60 ML/MIN/{1.73_M2}
GLUCOSE SERPL-MCNC: 113 MG/DL (ref 70–99)
HCT VFR BLD AUTO: 21.1 % (ref 40.5–52.5)
HGB BLD-MCNC: 7.1 G/DL (ref 13.5–17.5)
LYMPHOCYTES # BLD: 1.9 K/UL (ref 1–5.1)
LYMPHOCYTES NFR BLD: 12.4 %
MCH RBC QN AUTO: 34.8 PG (ref 26–34)
MCHC RBC AUTO-ENTMCNC: 33.5 G/DL (ref 31–36)
MCV RBC AUTO: 103.9 FL (ref 80–100)
MONOCYTES # BLD: 1.4 K/UL (ref 0–1.3)
MONOCYTES NFR BLD: 9.1 %
NEUTROPHILS # BLD: 11.6 K/UL (ref 1.7–7.7)
NEUTROPHILS NFR BLD: 75.8 %
PLATELET # BLD AUTO: 565 K/UL (ref 135–450)
PMV BLD AUTO: 8 FL (ref 5–10.5)
POTASSIUM SERPL-SCNC: 4.1 MMOL/L (ref 3.5–5.1)
RBC # BLD AUTO: 2.03 M/UL (ref 4.2–5.9)
SODIUM SERPL-SCNC: 136 MMOL/L (ref 136–145)
WBC # BLD AUTO: 15.2 K/UL (ref 4–11)

## 2023-04-27 PROCEDURE — 6370000000 HC RX 637 (ALT 250 FOR IP): Performed by: INTERNAL MEDICINE

## 2023-04-27 PROCEDURE — 80048 BASIC METABOLIC PNL TOTAL CA: CPT

## 2023-04-27 PROCEDURE — 6360000002 HC RX W HCPCS: Performed by: INTERNAL MEDICINE

## 2023-04-27 PROCEDURE — 1200000000 HC SEMI PRIVATE

## 2023-04-27 PROCEDURE — 2580000003 HC RX 258: Performed by: INTERNAL MEDICINE

## 2023-04-27 PROCEDURE — 85025 COMPLETE CBC W/AUTO DIFF WBC: CPT

## 2023-04-27 PROCEDURE — 6370000000 HC RX 637 (ALT 250 FOR IP): Performed by: PHYSICIAN ASSISTANT

## 2023-04-27 PROCEDURE — 36415 COLL VENOUS BLD VENIPUNCTURE: CPT

## 2023-04-27 RX ORDER — TRAMADOL HYDROCHLORIDE 50 MG/1
50 TABLET ORAL EVERY 6 HOURS PRN
Status: DISCONTINUED | OUTPATIENT
Start: 2023-04-27 | End: 2023-04-30 | Stop reason: HOSPADM

## 2023-04-27 RX ADMIN — HYDROMORPHONE HYDROCHLORIDE 2 MG: 1 INJECTION, SOLUTION INTRAMUSCULAR; INTRAVENOUS; SUBCUTANEOUS at 14:47

## 2023-04-27 RX ADMIN — HYDROMORPHONE HYDROCHLORIDE 2 MG: 1 INJECTION, SOLUTION INTRAMUSCULAR; INTRAVENOUS; SUBCUTANEOUS at 01:18

## 2023-04-27 RX ADMIN — MIDODRINE HYDROCHLORIDE 5 MG: 5 TABLET ORAL at 08:04

## 2023-04-27 RX ADMIN — HEPARIN SODIUM 5000 UNITS: 5000 INJECTION INTRAVENOUS; SUBCUTANEOUS at 21:00

## 2023-04-27 RX ADMIN — OXYCODONE 10 MG: 5 TABLET ORAL at 10:17

## 2023-04-27 RX ADMIN — HYDROMORPHONE HYDROCHLORIDE 2 MG: 1 INJECTION, SOLUTION INTRAMUSCULAR; INTRAVENOUS; SUBCUTANEOUS at 08:06

## 2023-04-27 RX ADMIN — GABAPENTIN 100 MG: 100 CAPSULE ORAL at 14:42

## 2023-04-27 RX ADMIN — MIDODRINE HYDROCHLORIDE 5 MG: 5 TABLET ORAL at 16:03

## 2023-04-27 RX ADMIN — GABAPENTIN 100 MG: 100 CAPSULE ORAL at 06:06

## 2023-04-27 RX ADMIN — MIDODRINE HYDROCHLORIDE 5 MG: 5 TABLET ORAL at 11:26

## 2023-04-27 RX ADMIN — GABAPENTIN 100 MG: 100 CAPSULE ORAL at 21:01

## 2023-04-27 RX ADMIN — VANCOMYCIN HYDROCHLORIDE 1250 MG: 10 INJECTION, POWDER, LYOPHILIZED, FOR SOLUTION INTRAVENOUS at 14:42

## 2023-04-27 RX ADMIN — HYDROXYUREA 1000 MG: 500 CAPSULE ORAL at 08:04

## 2023-04-27 RX ADMIN — HYDROMORPHONE HYDROCHLORIDE 2 MG: 1 INJECTION, SOLUTION INTRAMUSCULAR; INTRAVENOUS; SUBCUTANEOUS at 20:59

## 2023-04-27 RX ADMIN — HYDROMORPHONE HYDROCHLORIDE 2 MG: 1 INJECTION, SOLUTION INTRAMUSCULAR; INTRAVENOUS; SUBCUTANEOUS at 17:48

## 2023-04-27 RX ADMIN — HYDROXYUREA 1000 MG: 500 CAPSULE ORAL at 21:08

## 2023-04-27 RX ADMIN — KETOROLAC TROMETHAMINE 15 MG: 30 INJECTION, SOLUTION INTRAMUSCULAR; INTRAVENOUS at 02:18

## 2023-04-27 RX ADMIN — HYDROMORPHONE HYDROCHLORIDE 2 MG: 1 INJECTION, SOLUTION INTRAMUSCULAR; INTRAVENOUS; SUBCUTANEOUS at 04:21

## 2023-04-27 RX ADMIN — SODIUM CHLORIDE: 9 INJECTION, SOLUTION INTRAVENOUS at 11:31

## 2023-04-27 RX ADMIN — CEFEPIME 2000 MG: 2 INJECTION, POWDER, FOR SOLUTION INTRAVENOUS at 21:03

## 2023-04-27 RX ADMIN — HYDROMORPHONE HYDROCHLORIDE 2 MG: 1 INJECTION, SOLUTION INTRAMUSCULAR; INTRAVENOUS; SUBCUTANEOUS at 11:26

## 2023-04-27 RX ADMIN — OXYCODONE 10 MG: 5 TABLET ORAL at 22:49

## 2023-04-27 RX ADMIN — CEFEPIME 2000 MG: 2 INJECTION, POWDER, FOR SOLUTION INTRAVENOUS at 08:30

## 2023-04-27 RX ADMIN — OXYCODONE 10 MG: 5 TABLET ORAL at 16:03

## 2023-04-27 RX ADMIN — MUPIROCIN: 20 OINTMENT TOPICAL at 08:07

## 2023-04-27 RX ADMIN — VANCOMYCIN HYDROCHLORIDE 1250 MG: 10 INJECTION, POWDER, LYOPHILIZED, FOR SOLUTION INTRAVENOUS at 06:08

## 2023-04-27 ASSESSMENT — PAIN DESCRIPTION - LOCATION
LOCATION: PENIS;GROIN
LOCATION: GROIN
LOCATION: PERINEUM
LOCATION: PENIS;GROIN
LOCATION: GROIN;PENIS
LOCATION: PENIS;GROIN
LOCATION: GROIN

## 2023-04-27 ASSESSMENT — PAIN SCALES - GENERAL
PAINLEVEL_OUTOF10: 8
PAINLEVEL_OUTOF10: 8
PAINLEVEL_OUTOF10: 10
PAINLEVEL_OUTOF10: 7
PAINLEVEL_OUTOF10: 9
PAINLEVEL_OUTOF10: 10
PAINLEVEL_OUTOF10: 10
PAINLEVEL_OUTOF10: 8
PAINLEVEL_OUTOF10: 10
PAINLEVEL_OUTOF10: 7
PAINLEVEL_OUTOF10: 8
PAINLEVEL_OUTOF10: 8
PAINLEVEL_OUTOF10: 7

## 2023-04-27 ASSESSMENT — PAIN DESCRIPTION - DESCRIPTORS
DESCRIPTORS: ACHING
DESCRIPTORS: SHARP;ACHING
DESCRIPTORS: ACHING
DESCRIPTORS: ACHING;SHARP

## 2023-04-27 ASSESSMENT — PAIN SCALES - WONG BAKER: WONGBAKER_NUMERICALRESPONSE: 0

## 2023-04-27 ASSESSMENT — PAIN DESCRIPTION - ORIENTATION: ORIENTATION: RIGHT;LEFT

## 2023-04-28 LAB
ANION GAP SERPL CALCULATED.3IONS-SCNC: 12 MMOL/L (ref 3–16)
BASOPHILS # BLD: 0.3 K/UL (ref 0–0.2)
BASOPHILS NFR BLD: 2 %
BUN SERPL-MCNC: 7 MG/DL (ref 7–20)
CALCIUM SERPL-MCNC: 9.9 MG/DL (ref 8.3–10.6)
CHLORIDE SERPL-SCNC: 102 MMOL/L (ref 99–110)
CO2 SERPL-SCNC: 22 MMOL/L (ref 21–32)
CREAT SERPL-MCNC: 0.6 MG/DL (ref 0.9–1.3)
DEPRECATED RDW RBC AUTO: 19.8 % (ref 12.4–15.4)
EOSINOPHIL # BLD: 0.7 K/UL (ref 0–0.6)
EOSINOPHIL NFR BLD: 4 %
GFR SERPLBLD CREATININE-BSD FMLA CKD-EPI: >60 ML/MIN/{1.73_M2}
GLUCOSE SERPL-MCNC: 94 MG/DL (ref 70–99)
HCT VFR BLD AUTO: 25.1 % (ref 40.5–52.5)
HGB BLD-MCNC: 7.5 G/DL (ref 13.5–17.5)
LYMPHOCYTES # BLD: 3.8 K/UL (ref 1–5.1)
LYMPHOCYTES NFR BLD: 23 %
MCH RBC QN AUTO: 33 PG (ref 26–34)
MCHC RBC AUTO-ENTMCNC: 29.8 G/DL (ref 31–36)
MCV RBC AUTO: 110.9 FL (ref 80–100)
MONOCYTES # BLD: 1.8 K/UL (ref 0–1.3)
MONOCYTES NFR BLD: 11 %
NEUTROPHILS # BLD: 9.8 K/UL (ref 1.7–7.7)
NEUTROPHILS NFR BLD: 59 %
NEUTS BAND NFR BLD MANUAL: 1 % (ref 0–7)
NRBC BLD-RTO: 4 /100 WBC
PATH INTERP BLD-IMP: NO
PATH INTERP BLD-IMP: NORMAL
PLATELET # BLD AUTO: 592 K/UL (ref 135–450)
PLATELET BLD QL SMEAR: ABNORMAL
PMV BLD AUTO: 8.7 FL (ref 5–10.5)
POTASSIUM SERPL-SCNC: 4.4 MMOL/L (ref 3.5–5.1)
RBC # BLD AUTO: 2.26 M/UL (ref 4.2–5.9)
SLIDE REVIEW: ABNORMAL
SODIUM SERPL-SCNC: 136 MMOL/L (ref 136–145)
WBC # BLD AUTO: 16.4 K/UL (ref 4–11)

## 2023-04-28 PROCEDURE — 6370000000 HC RX 637 (ALT 250 FOR IP): Performed by: INTERNAL MEDICINE

## 2023-04-28 PROCEDURE — 6370000000 HC RX 637 (ALT 250 FOR IP): Performed by: NURSE PRACTITIONER

## 2023-04-28 PROCEDURE — 6360000002 HC RX W HCPCS: Performed by: NURSE PRACTITIONER

## 2023-04-28 PROCEDURE — 80048 BASIC METABOLIC PNL TOTAL CA: CPT

## 2023-04-28 PROCEDURE — 6360000002 HC RX W HCPCS: Performed by: INTERNAL MEDICINE

## 2023-04-28 PROCEDURE — 1200000000 HC SEMI PRIVATE

## 2023-04-28 PROCEDURE — 2500000003 HC RX 250 WO HCPCS: Performed by: NURSE PRACTITIONER

## 2023-04-28 PROCEDURE — 2580000003 HC RX 258: Performed by: INTERNAL MEDICINE

## 2023-04-28 PROCEDURE — 6370000000 HC RX 637 (ALT 250 FOR IP): Performed by: PHYSICIAN ASSISTANT

## 2023-04-28 PROCEDURE — 36415 COLL VENOUS BLD VENIPUNCTURE: CPT

## 2023-04-28 PROCEDURE — 85025 COMPLETE CBC W/AUTO DIFF WBC: CPT

## 2023-04-28 RX ORDER — OXYCODONE HYDROCHLORIDE 5 MG/1
10 TABLET ORAL EVERY 4 HOURS PRN
Status: DISCONTINUED | OUTPATIENT
Start: 2023-04-28 | End: 2023-04-30 | Stop reason: HOSPADM

## 2023-04-28 RX ADMIN — OXYCODONE 20 MG: 5 TABLET ORAL at 15:21

## 2023-04-28 RX ADMIN — CEFEPIME 2000 MG: 2 INJECTION, POWDER, FOR SOLUTION INTRAVENOUS at 09:46

## 2023-04-28 RX ADMIN — MIDODRINE HYDROCHLORIDE 5 MG: 5 TABLET ORAL at 12:23

## 2023-04-28 RX ADMIN — HYDROXYUREA 1000 MG: 500 CAPSULE ORAL at 09:43

## 2023-04-28 RX ADMIN — OXYCODONE 20 MG: 5 TABLET ORAL at 19:56

## 2023-04-28 RX ADMIN — HYDROMORPHONE HYDROCHLORIDE 2 MG: 1 INJECTION, SOLUTION INTRAMUSCULAR; INTRAVENOUS; SUBCUTANEOUS at 18:47

## 2023-04-28 RX ADMIN — HYDROXYUREA 1000 MG: 500 CAPSULE ORAL at 21:00

## 2023-04-28 RX ADMIN — GABAPENTIN 100 MG: 100 CAPSULE ORAL at 06:13

## 2023-04-28 RX ADMIN — MUPIROCIN: 20 OINTMENT TOPICAL at 09:44

## 2023-04-28 RX ADMIN — VANCOMYCIN HYDROCHLORIDE 1250 MG: 10 INJECTION, POWDER, LYOPHILIZED, FOR SOLUTION INTRAVENOUS at 15:26

## 2023-04-28 RX ADMIN — GABAPENTIN 100 MG: 100 CAPSULE ORAL at 13:46

## 2023-04-28 RX ADMIN — HYDROMORPHONE HYDROCHLORIDE 2 MG: 1 INJECTION, SOLUTION INTRAMUSCULAR; INTRAVENOUS; SUBCUTANEOUS at 00:19

## 2023-04-28 RX ADMIN — GABAPENTIN 100 MG: 100 CAPSULE ORAL at 21:01

## 2023-04-28 RX ADMIN — HYDROMORPHONE HYDROCHLORIDE 2 MG: 1 INJECTION, SOLUTION INTRAMUSCULAR; INTRAVENOUS; SUBCUTANEOUS at 03:34

## 2023-04-28 RX ADMIN — MIDODRINE HYDROCHLORIDE 5 MG: 5 TABLET ORAL at 09:44

## 2023-04-28 RX ADMIN — VANCOMYCIN HYDROCHLORIDE 1250 MG: 10 INJECTION, POWDER, LYOPHILIZED, FOR SOLUTION INTRAVENOUS at 06:15

## 2023-04-28 RX ADMIN — SODIUM CHLORIDE: 9 INJECTION, SOLUTION INTRAVENOUS at 15:23

## 2023-04-28 RX ADMIN — OXYCODONE 10 MG: 5 TABLET ORAL at 10:59

## 2023-04-28 RX ADMIN — MIDODRINE HYDROCHLORIDE 5 MG: 5 TABLET ORAL at 18:47

## 2023-04-28 RX ADMIN — SODIUM CHLORIDE: 9 INJECTION, SOLUTION INTRAVENOUS at 00:29

## 2023-04-28 RX ADMIN — HYDROMORPHONE HYDROCHLORIDE 2 MG: 1 INJECTION, SOLUTION INTRAMUSCULAR; INTRAVENOUS; SUBCUTANEOUS at 22:49

## 2023-04-28 RX ADMIN — HYDROMORPHONE HYDROCHLORIDE 2 MG: 1 INJECTION, SOLUTION INTRAMUSCULAR; INTRAVENOUS; SUBCUTANEOUS at 06:46

## 2023-04-28 RX ADMIN — HYDROMORPHONE HYDROCHLORIDE 2 MG: 1 INJECTION, SOLUTION INTRAMUSCULAR; INTRAVENOUS; SUBCUTANEOUS at 09:42

## 2023-04-28 RX ADMIN — VANCOMYCIN HYDROCHLORIDE 1250 MG: 10 INJECTION, POWDER, LYOPHILIZED, FOR SOLUTION INTRAVENOUS at 00:30

## 2023-04-28 RX ADMIN — HYDROMORPHONE HYDROCHLORIDE 2 MG: 1 INJECTION, SOLUTION INTRAMUSCULAR; INTRAVENOUS; SUBCUTANEOUS at 13:43

## 2023-04-28 ASSESSMENT — PAIN SCALES - GENERAL
PAINLEVEL_OUTOF10: 9
PAINLEVEL_OUTOF10: 8
PAINLEVEL_OUTOF10: 7
PAINLEVEL_OUTOF10: 8
PAINLEVEL_OUTOF10: 7
PAINLEVEL_OUTOF10: 7
PAINLEVEL_OUTOF10: 8
PAINLEVEL_OUTOF10: 7
PAINLEVEL_OUTOF10: 10
PAINLEVEL_OUTOF10: 10
PAINLEVEL_OUTOF10: 8
PAINLEVEL_OUTOF10: 8
PAINLEVEL_OUTOF10: 9

## 2023-04-28 ASSESSMENT — PAIN DESCRIPTION - DESCRIPTORS
DESCRIPTORS: DISCOMFORT
DESCRIPTORS: SHARP
DESCRIPTORS: ACHING
DESCRIPTORS: ACHING;SHARP
DESCRIPTORS: ACHING;SHARP
DESCRIPTORS: SHARP
DESCRIPTORS: ACHING;SHARP
DESCRIPTORS: ACHING
DESCRIPTORS: ACHING
DESCRIPTORS: ACHING;SHARP;OTHER (COMMENT)
DESCRIPTORS: ACHING
DESCRIPTORS: ACHING;SHARP;SHOOTING

## 2023-04-28 ASSESSMENT — PAIN DESCRIPTION - LOCATION
LOCATION: GROIN
LOCATION: PERINEUM
LOCATION: GROIN
LOCATION: PERINEUM
LOCATION: GROIN
LOCATION: PERINEUM
LOCATION: GROIN
LOCATION: PERINEUM
LOCATION: PERINEUM
LOCATION: GROIN

## 2023-04-28 ASSESSMENT — PAIN - FUNCTIONAL ASSESSMENT

## 2023-04-28 ASSESSMENT — PAIN DESCRIPTION - ORIENTATION
ORIENTATION: MID
ORIENTATION: LOWER
ORIENTATION: LEFT;RIGHT
ORIENTATION: RIGHT;LEFT
ORIENTATION: POSTERIOR
ORIENTATION: MID
ORIENTATION: RIGHT;LEFT

## 2023-04-28 ASSESSMENT — PAIN SCALES - WONG BAKER
WONGBAKER_NUMERICALRESPONSE: 0

## 2023-04-29 LAB
ANION GAP SERPL CALCULATED.3IONS-SCNC: 9 MMOL/L (ref 3–16)
BASOPHILS # BLD: 0.1 K/UL (ref 0–0.2)
BASOPHILS NFR BLD: 1 %
BUN SERPL-MCNC: 6 MG/DL (ref 7–20)
CALCIUM SERPL-MCNC: 9.6 MG/DL (ref 8.3–10.6)
CHLORIDE SERPL-SCNC: 101 MMOL/L (ref 99–110)
CO2 SERPL-SCNC: 26 MMOL/L (ref 21–32)
CREAT SERPL-MCNC: 0.7 MG/DL (ref 0.9–1.3)
DEPRECATED RDW RBC AUTO: 19.4 % (ref 12.4–15.4)
EOSINOPHIL # BLD: 0.3 K/UL (ref 0–0.6)
EOSINOPHIL NFR BLD: 2.3 %
GFR SERPLBLD CREATININE-BSD FMLA CKD-EPI: >60 ML/MIN/{1.73_M2}
GLUCOSE SERPL-MCNC: 149 MG/DL (ref 70–99)
HCT VFR BLD AUTO: 23 % (ref 40.5–52.5)
HGB BLD-MCNC: 7.5 G/DL (ref 13.5–17.5)
LYMPHOCYTES # BLD: 2.1 K/UL (ref 1–5.1)
LYMPHOCYTES NFR BLD: 14.7 %
MCH RBC QN AUTO: 34.4 PG (ref 26–34)
MCHC RBC AUTO-ENTMCNC: 32.9 G/DL (ref 31–36)
MCV RBC AUTO: 104.5 FL (ref 80–100)
MONOCYTES # BLD: 1.1 K/UL (ref 0–1.3)
MONOCYTES NFR BLD: 7.8 %
NEUTROPHILS # BLD: 10.8 K/UL (ref 1.7–7.7)
NEUTROPHILS NFR BLD: 74.2 %
PLATELET # BLD AUTO: 755 K/UL (ref 135–450)
PMV BLD AUTO: 7.3 FL (ref 5–10.5)
POTASSIUM SERPL-SCNC: 4.2 MMOL/L (ref 3.5–5.1)
RBC # BLD AUTO: 2.2 M/UL (ref 4.2–5.9)
SODIUM SERPL-SCNC: 136 MMOL/L (ref 136–145)
WBC # BLD AUTO: 14.5 K/UL (ref 4–11)

## 2023-04-29 PROCEDURE — 2500000003 HC RX 250 WO HCPCS: Performed by: NURSE PRACTITIONER

## 2023-04-29 PROCEDURE — 2580000003 HC RX 258: Performed by: INTERNAL MEDICINE

## 2023-04-29 PROCEDURE — 1200000000 HC SEMI PRIVATE

## 2023-04-29 PROCEDURE — 36415 COLL VENOUS BLD VENIPUNCTURE: CPT

## 2023-04-29 PROCEDURE — 85025 COMPLETE CBC W/AUTO DIFF WBC: CPT

## 2023-04-29 PROCEDURE — 6370000000 HC RX 637 (ALT 250 FOR IP): Performed by: INTERNAL MEDICINE

## 2023-04-29 PROCEDURE — 6370000000 HC RX 637 (ALT 250 FOR IP): Performed by: NURSE PRACTITIONER

## 2023-04-29 PROCEDURE — 80048 BASIC METABOLIC PNL TOTAL CA: CPT

## 2023-04-29 PROCEDURE — 6360000002 HC RX W HCPCS: Performed by: NURSE PRACTITIONER

## 2023-04-29 RX ADMIN — OXYCODONE 20 MG: 5 TABLET ORAL at 04:54

## 2023-04-29 RX ADMIN — HYDROMORPHONE HYDROCHLORIDE 2 MG: 1 INJECTION, SOLUTION INTRAMUSCULAR; INTRAVENOUS; SUBCUTANEOUS at 03:15

## 2023-04-29 RX ADMIN — GABAPENTIN 100 MG: 100 CAPSULE ORAL at 21:01

## 2023-04-29 RX ADMIN — MIDODRINE HYDROCHLORIDE 5 MG: 5 TABLET ORAL at 13:16

## 2023-04-29 RX ADMIN — OXYCODONE 20 MG: 5 TABLET ORAL at 22:27

## 2023-04-29 RX ADMIN — OXYCODONE 20 MG: 5 TABLET ORAL at 14:36

## 2023-04-29 RX ADMIN — HYDROMORPHONE HYDROCHLORIDE 2 MG: 1 INJECTION, SOLUTION INTRAMUSCULAR; INTRAVENOUS; SUBCUTANEOUS at 13:19

## 2023-04-29 RX ADMIN — GABAPENTIN 100 MG: 100 CAPSULE ORAL at 13:16

## 2023-04-29 RX ADMIN — SODIUM CHLORIDE, PRESERVATIVE FREE 10 ML: 5 INJECTION INTRAVENOUS at 19:38

## 2023-04-29 RX ADMIN — OXYCODONE 20 MG: 5 TABLET ORAL at 00:57

## 2023-04-29 RX ADMIN — OXYCODONE 20 MG: 5 TABLET ORAL at 18:27

## 2023-04-29 RX ADMIN — HYDROXYUREA 1000 MG: 500 CAPSULE ORAL at 21:01

## 2023-04-29 RX ADMIN — HYDROMORPHONE HYDROCHLORIDE 2 MG: 1 INJECTION, SOLUTION INTRAMUSCULAR; INTRAVENOUS; SUBCUTANEOUS at 07:36

## 2023-04-29 RX ADMIN — MIDODRINE HYDROCHLORIDE 5 MG: 5 TABLET ORAL at 09:22

## 2023-04-29 RX ADMIN — OXYCODONE 20 MG: 5 TABLET ORAL at 09:22

## 2023-04-29 RX ADMIN — HYDROXYUREA 1000 MG: 500 CAPSULE ORAL at 09:22

## 2023-04-29 RX ADMIN — MUPIROCIN: 20 OINTMENT TOPICAL at 09:24

## 2023-04-29 RX ADMIN — HYDROMORPHONE HYDROCHLORIDE 2 MG: 1 INJECTION, SOLUTION INTRAMUSCULAR; INTRAVENOUS; SUBCUTANEOUS at 19:31

## 2023-04-29 RX ADMIN — GABAPENTIN 100 MG: 100 CAPSULE ORAL at 05:51

## 2023-04-29 RX ADMIN — MIDODRINE HYDROCHLORIDE 5 MG: 5 TABLET ORAL at 18:10

## 2023-04-29 ASSESSMENT — PAIN DESCRIPTION - LOCATION
LOCATION: GROIN
LOCATION: PENIS
LOCATION: GROIN
LOCATION: PENIS

## 2023-04-29 ASSESSMENT — PAIN DESCRIPTION - ORIENTATION
ORIENTATION: ANTERIOR
ORIENTATION: MID

## 2023-04-29 ASSESSMENT — PAIN DESCRIPTION - DESCRIPTORS
DESCRIPTORS: ACHING
DESCRIPTORS: ACHING
DESCRIPTORS: ACHING;SHARP
DESCRIPTORS: ACHING
DESCRIPTORS: ACHING
DESCRIPTORS: ACHING;CRAMPING
DESCRIPTORS: ACHING
DESCRIPTORS: ACHING

## 2023-04-29 ASSESSMENT — PAIN SCALES - GENERAL
PAINLEVEL_OUTOF10: 7
PAINLEVEL_OUTOF10: 9
PAINLEVEL_OUTOF10: 8
PAINLEVEL_OUTOF10: 9
PAINLEVEL_OUTOF10: 9
PAINLEVEL_OUTOF10: 7
PAINLEVEL_OUTOF10: 7

## 2023-04-29 NOTE — ED PROVIDER NOTES
[address] ED  EMERGENCY DEPARTMENT ENCOUNTER        Pt Name: Miroslava Gross  MRN: [id number]  [place] 2000  Date of evaluation: 4/18/2023  Provider: CARMELITA Morgan - CNP  PCP: Janet Sims MD  Note Started: 2:50 PM EDT 4/18/23       I have seen and evaluated this patient with my supervising physician No att. providers found. CHIEF COMPLAINT       Chief Complaint   Patient presents with    Back Pain     Pt is having lower back pain with pain shooting down left leg. Pt states he is in sickle cell crisis. HISTORY OF PRESENT ILLNESS: 1 or more Elements     History From: patient     Limitations to history : None    Social Determinants Significantly Affecting Health : None    Chief Complaint:back pain and chest pain     Miroslava Gross is a 20 y.o. male who presents with c/o back pain and chest pain that feels like his regular sickle cell crisis. He states he last was admitted for this back in Feb. he describes a sharp nagging pain in his chest.  He denies any abdominal pain, nausea or vomiting. He states his symptoms began around 7 AM this morning. He states that he does not currently have a primary care physician. He states he used to take oxycodone on a regular basis but does not now. He does have a history of reported asthma and states his \"breathing is better now. \"  He denies any shortness of breath. He has is supposed to take Eliquis, however states he may have a little bit at home, however admits to having not taken it after further questioning. His vital signs were reviewed blood pressure 128/68, temp 98 degrees heart rate 98, respirations 20, pulse ox 99% on room air. Nursing Notes were all reviewed and agreed with or any disagreements were addressed in the HPI. REVIEW OF SYSTEMS :      Review of Systems   Constitutional:  Negative for chills, diaphoresis and fever. HENT:  Negative for drooling and facial swelling.     Eyes:
Differential diagnosis includes but is not limited to: Sickle cell crisis, acute chest, PE    WORKUP INTERPRETATION:  Blood function testing overall unremarkable. No significant joint abnormalities or evidence of kidney dysfunction. Leukocytosis 20.8, anemia of 8.5. No thrombocytopenia. Reticulocyte count elevated. Lactate within normal limits. Urinalysis shows small blood, no evidence of infection. D-dimer significantly elevated 2.43. Chest imaging shows no evidence of chest, PE, pneumothorax echo effusion, pneumonia, or other acute abnormality. CONSULTS:   Patient presentation and work-up discussed with inpatient team accepted the patient for admission    SCREENINGS:       Blade Coma Scale  Eye Opening: Spontaneous  Best Verbal Response: Oriented  Best Motor Response: Obeys commands  Blade Coma Scale Score: 15                CIWA Assessment  BP: (!) 146/72  Heart Rate: 96           Is this patient to be included in the SEP-1 Core Measure due to severe sepsis or septic shock?    No   Exclusion criteria - the patient is NOT to be included for SEP-1 Core Measure due to:  May have criteria for sepsis, but does not meet criteria for severe sepsis or septic shock      TREATMENT:  During the patient's ED course, the patient was given:  Medications   ketorolac (TORADOL) injection 15 mg (15 mg IntraVENous Given 4/18/23 1414)   HYDROmorphone (DILAUDID) injection 0.5 mg (0.5 mg IntraVENous Given 4/18/23 1414)   ondansetron (ZOFRAN) injection 4 mg (4 mg IntraVENous Given 4/18/23 1414)   HYDROmorphone (DILAUDID) injection 0.5 mg (0.5 mg IntraVENous Given 4/18/23 1554)   HYDROmorphone (DILAUDID) injection 0.25 mg (0.25 mg IntraVENous Given 4/18/23 1741)   HYDROmorphone (DILAUDID) injection 0.5 mg (0.5 mg IntraVENous Given 4/18/23 1928)   iopamidol (ISOVUE-370) 76 % injection 75 mL (75 mLs IntraVENous Given 4/18/23 2144)   ketorolac (TORADOL) injection 15 mg (15 mg IntraVENous Given 4/18/23 2040)

## 2023-04-30 VITALS
DIASTOLIC BLOOD PRESSURE: 50 MMHG | SYSTOLIC BLOOD PRESSURE: 104 MMHG | HEART RATE: 91 BPM | WEIGHT: 192 LBS | BODY MASS INDEX: 28.44 KG/M2 | TEMPERATURE: 98.3 F | OXYGEN SATURATION: 96 % | RESPIRATION RATE: 18 BRPM | HEIGHT: 69 IN

## 2023-04-30 PROCEDURE — 6370000000 HC RX 637 (ALT 250 FOR IP): Performed by: NURSE PRACTITIONER

## 2023-04-30 PROCEDURE — 6370000000 HC RX 637 (ALT 250 FOR IP): Performed by: INTERNAL MEDICINE

## 2023-04-30 PROCEDURE — 2580000003 HC RX 258: Performed by: INTERNAL MEDICINE

## 2023-04-30 PROCEDURE — 6360000002 HC RX W HCPCS: Performed by: NURSE PRACTITIONER

## 2023-04-30 RX ORDER — OXYCODONE HYDROCHLORIDE 20 MG/1
20 TABLET ORAL EVERY 6 HOURS PRN
Qty: 28 TABLET | Refills: 0 | Status: ON HOLD | OUTPATIENT
Start: 2023-04-30 | End: 2023-05-07 | Stop reason: SDUPTHER

## 2023-04-30 RX ADMIN — MIDODRINE HYDROCHLORIDE 5 MG: 5 TABLET ORAL at 07:58

## 2023-04-30 RX ADMIN — HYDROXYUREA 1000 MG: 500 CAPSULE ORAL at 07:58

## 2023-04-30 RX ADMIN — HYDROMORPHONE HYDROCHLORIDE 2 MG: 1 INJECTION, SOLUTION INTRAMUSCULAR; INTRAVENOUS; SUBCUTANEOUS at 07:57

## 2023-04-30 RX ADMIN — MIDODRINE HYDROCHLORIDE 5 MG: 5 TABLET ORAL at 12:52

## 2023-04-30 RX ADMIN — GABAPENTIN 100 MG: 100 CAPSULE ORAL at 12:52

## 2023-04-30 RX ADMIN — HYDROMORPHONE HYDROCHLORIDE 2 MG: 1 INJECTION, SOLUTION INTRAMUSCULAR; INTRAVENOUS; SUBCUTANEOUS at 13:55

## 2023-04-30 RX ADMIN — OXYCODONE 20 MG: 5 TABLET ORAL at 02:32

## 2023-04-30 RX ADMIN — OXYCODONE 20 MG: 5 TABLET ORAL at 10:30

## 2023-04-30 RX ADMIN — GABAPENTIN 100 MG: 100 CAPSULE ORAL at 06:34

## 2023-04-30 RX ADMIN — HYDROMORPHONE HYDROCHLORIDE 2 MG: 1 INJECTION, SOLUTION INTRAMUSCULAR; INTRAVENOUS; SUBCUTANEOUS at 01:29

## 2023-04-30 RX ADMIN — MUPIROCIN: 20 OINTMENT TOPICAL at 07:59

## 2023-04-30 RX ADMIN — OXYCODONE 20 MG: 5 TABLET ORAL at 06:34

## 2023-04-30 RX ADMIN — OXYCODONE 20 MG: 5 TABLET ORAL at 14:49

## 2023-04-30 RX ADMIN — SODIUM CHLORIDE, PRESERVATIVE FREE 10 ML: 5 INJECTION INTRAVENOUS at 07:58

## 2023-04-30 ASSESSMENT — PAIN DESCRIPTION - ORIENTATION
ORIENTATION: MID

## 2023-04-30 ASSESSMENT — PAIN DESCRIPTION - DESCRIPTORS
DESCRIPTORS: ACHING

## 2023-04-30 ASSESSMENT — PAIN DESCRIPTION - LOCATION
LOCATION: GROIN

## 2023-05-01 NOTE — DISCHARGE SUMMARY
Hospital Medicine Discharge Summary    Patient ID: Yudy Acevedo      Patient's PCP: No primary care provider on file. Admit Date: 4/20/2023     Discharge Date: 4/30/2023      Admitting Physician: Ya Smith MD     Discharge Physician: Caterina Mosquera MD     Discharge Diagnoses: Active Hospital Problems    Diagnosis     History of pulmonary embolism [Z86.711]     History of DVT in adulthood [Z86.718]     Sickle cell crisis ([facility] [D57.00]     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 ([facility] [D57.1, N48.32]        The patient was seen and examined on day of discharge and this discharge summary is in conjunction with any daily progress note from day of discharge. Hospital Course:       Presenting Admission History:        \"Patient is a 23-year-old [gentleman with a history of sickle cell anemia who presented to the emergency department with diffuse body aches and pain secondary to sickle cell. This morning he said he woke up with priapism but was able to get over that. Today he has intractable back pain. He does not have a doctor does not have medications at home to control his pain. Emergency department work-up was essentially normal but his pain could not be controlled. He will be admitted for pain control. \"        Assessment/Plan:     Current Principal Problem:     Sickle cell crisis ([facility] [address] [facility]      Acute priapism  Urology following, appreciate assistance   4/20/2023 POD# 9 takedown of ischemic priapism  4/24/2023: Intracavernosal injection of phenylephrine   No additional procedures planned at this time  Voiding per urinal   Continue pain control, improving. Patient taking oral and IV pain medications. Continue weaning IV Dilaudid. Urology signed off- follow up with Dr. Edinson Adames in office      Sickle cell crisis: Severe   4/21: Hematology consulted/evaluated: Apparently patient was dismissed from the practice due to noncompliance.  He was receiving narcotics from their practice, plan is to

## 2023-05-03 ENCOUNTER — HOSPITAL ENCOUNTER (INPATIENT)
Age: 23
LOS: 3 days | Discharge: HOME | DRG: 988 | End: 2023-05-07
Attending: EMERGENCY MEDICINE | Admitting: INTERNAL MEDICINE
Payer: COMMERCIAL

## 2023-05-03 DIAGNOSIS — N49.2 ABSCESS OF SCROTUM: ICD-10-CM

## 2023-05-03 DIAGNOSIS — R79.89 ELEVATED PLATELET COUNT: ICD-10-CM

## 2023-05-03 DIAGNOSIS — D57.00 ACUTE SICKLE CELL CRISIS (HCC): ICD-10-CM

## 2023-05-03 DIAGNOSIS — N48.89 PENILE SWELLING: Primary | ICD-10-CM

## 2023-05-03 DIAGNOSIS — D57.1 SICKLE CELL DISEASE WITHOUT CRISIS (HCC): ICD-10-CM

## 2023-05-03 DIAGNOSIS — N48.21 ABSCESS OF PENIS: ICD-10-CM

## 2023-05-03 DIAGNOSIS — T81.89XA PROBLEM INVOLVING SURGICAL INCISION: ICD-10-CM

## 2023-05-03 LAB
ALBUMIN SERPL-MCNC: 4.3 G/DL (ref 3.4–5)
ALBUMIN/GLOB SERPL: 1.2 {RATIO} (ref 1.1–2.2)
ALP SERPL-CCNC: 128 U/L (ref 40–129)
ALT SERPL-CCNC: 29 U/L (ref 10–40)
ANION GAP SERPL CALCULATED.3IONS-SCNC: 11 MMOL/L (ref 3–16)
ANISOCYTOSIS BLD QL SMEAR: ABNORMAL
AST SERPL-CCNC: 27 U/L (ref 15–37)
BASOPHILS # BLD: 0 K/UL (ref 0–0.2)
BASOPHILS NFR BLD: 0 %
BILIRUB SERPL-MCNC: 2.4 MG/DL (ref 0–1)
BUN SERPL-MCNC: 7 MG/DL (ref 7–20)
CALCIUM SERPL-MCNC: 10 MG/DL (ref 8.3–10.6)
CHLORIDE SERPL-SCNC: 104 MMOL/L (ref 99–110)
CO2 SERPL-SCNC: 26 MMOL/L (ref 21–32)
CREAT SERPL-MCNC: 0.8 MG/DL (ref 0.9–1.3)
DEPRECATED RDW RBC AUTO: 22.1 % (ref 12.4–15.4)
EOSINOPHIL # BLD: 0 K/UL (ref 0–0.6)
EOSINOPHIL NFR BLD: 0 %
GFR SERPLBLD CREATININE-BSD FMLA CKD-EPI: >60 ML/MIN/{1.73_M2}
GLUCOSE SERPL-MCNC: 122 MG/DL (ref 70–99)
HCT VFR BLD AUTO: 25.5 % (ref 40.5–52.5)
HGB BLD-MCNC: 8.3 G/DL (ref 13.5–17.5)
LACTATE BLDV-SCNC: 0.8 MMOL/L (ref 0.4–1.9)
LYMPHOCYTES # BLD: 1.8 K/UL (ref 1–5.1)
LYMPHOCYTES NFR BLD: 11 %
MACROCYTES BLD QL SMEAR: ABNORMAL
MCH RBC QN AUTO: 34.9 PG (ref 26–34)
MCHC RBC AUTO-ENTMCNC: 32.6 G/DL (ref 31–36)
MCV RBC AUTO: 107 FL (ref 80–100)
MICROCYTES BLD QL SMEAR: ABNORMAL
MONOCYTES # BLD: 1.6 K/UL (ref 0–1.3)
MONOCYTES NFR BLD: 10 %
NEUTROPHILS # BLD: 13 K/UL (ref 1.7–7.7)
NEUTROPHILS NFR BLD: 79 %
NEUTS HYPERSEG BLD QL SMEAR: PRESENT
NRBC BLD-RTO: 4 /100 WBC
OVALOCYTES BLD QL SMEAR: ABNORMAL
PATH INTERP BLD-IMP: NO
PLATELET # BLD AUTO: 1176 K/UL (ref 135–450)
PLATELET BLD QL SMEAR: ABNORMAL
PMV BLD AUTO: 7.6 FL (ref 5–10.5)
POIKILOCYTOSIS BLD QL SMEAR: ABNORMAL
POLYCHROMASIA BLD QL SMEAR: ABNORMAL
POTASSIUM SERPL-SCNC: 4.3 MMOL/L (ref 3.5–5.1)
PROT SERPL-MCNC: 7.9 G/DL (ref 6.4–8.2)
RBC # BLD AUTO: 2.39 M/UL (ref 4.2–5.9)
SICKLE CELLS BLD QL SMEAR: ABNORMAL
SLIDE REVIEW: ABNORMAL
SODIUM SERPL-SCNC: 141 MMOL/L (ref 136–145)
SPHEROCYTES BLD QL SMEAR: ABNORMAL
STOMATOCYTES BLD QL SMEAR: ABNORMAL
TARGETS BLD QL SMEAR: ABNORMAL
WBC # BLD AUTO: 16.4 K/UL (ref 4–11)

## 2023-05-03 PROCEDURE — 99285 EMERGENCY DEPT VISIT HI MDM: CPT

## 2023-05-03 PROCEDURE — 83605 ASSAY OF LACTIC ACID: CPT

## 2023-05-03 PROCEDURE — 96374 THER/PROPH/DIAG INJ IV PUSH: CPT

## 2023-05-03 PROCEDURE — 87040 BLOOD CULTURE FOR BACTERIA: CPT

## 2023-05-03 PROCEDURE — 6360000002 HC RX W HCPCS: Performed by: NURSE PRACTITIONER

## 2023-05-03 PROCEDURE — 80053 COMPREHEN METABOLIC PANEL: CPT

## 2023-05-03 PROCEDURE — 85025 COMPLETE CBC W/AUTO DIFF WBC: CPT

## 2023-05-03 RX ORDER — ACETAMINOPHEN 500 MG
1000 TABLET ORAL ONCE
Status: DISCONTINUED | OUTPATIENT
Start: 2023-05-03 | End: 2023-05-07 | Stop reason: HOSPADM

## 2023-05-03 RX ORDER — KETOROLAC TROMETHAMINE 30 MG/ML
50 INJECTION, SOLUTION INTRAMUSCULAR; INTRAVENOUS ONCE
Status: COMPLETED | OUTPATIENT
Start: 2023-05-03 | End: 2023-05-03

## 2023-05-03 RX ADMIN — KETOROLAC TROMETHAMINE 50 MG: 30 INJECTION, SOLUTION INTRAMUSCULAR at 20:58

## 2023-05-03 ASSESSMENT — PAIN - FUNCTIONAL ASSESSMENT: PAIN_FUNCTIONAL_ASSESSMENT: 0-10

## 2023-05-03 ASSESSMENT — PAIN DESCRIPTION - FREQUENCY: FREQUENCY: CONTINUOUS

## 2023-05-03 ASSESSMENT — PAIN SCALES - GENERAL
PAINLEVEL_OUTOF10: 10
PAINLEVEL_OUTOF10: 8
PAINLEVEL_OUTOF10: 10

## 2023-05-03 ASSESSMENT — PAIN DESCRIPTION - DESCRIPTORS: DESCRIPTORS: PRESSURE

## 2023-05-03 ASSESSMENT — PAIN DESCRIPTION - ONSET: ONSET: GRADUAL

## 2023-05-03 ASSESSMENT — PAIN DESCRIPTION - PAIN TYPE: TYPE: ACUTE PAIN

## 2023-05-03 ASSESSMENT — PAIN DESCRIPTION - LOCATION: LOCATION: GROIN

## 2023-05-04 ENCOUNTER — APPOINTMENT (OUTPATIENT)
Dept: MRI IMAGING | Age: 23
DRG: 988 | End: 2023-05-04
Payer: COMMERCIAL

## 2023-05-04 PROBLEM — S31.20XS: Status: ACTIVE | Noted: 2023-05-04

## 2023-05-04 LAB
AMPHETAMINES UR QL SCN>1000 NG/ML: ABNORMAL
ANION GAP SERPL CALCULATED.3IONS-SCNC: 16 MMOL/L (ref 3–16)
ANISOCYTOSIS BLD QL SMEAR: ABNORMAL
BARBITURATES UR QL SCN>200 NG/ML: ABNORMAL
BASOPHILS # BLD: 0 K/UL (ref 0–0.2)
BASOPHILS NFR BLD: 0 %
BENZODIAZ UR QL SCN>200 NG/ML: ABNORMAL
BUN SERPL-MCNC: 9 MG/DL (ref 7–20)
BURR CELLS BLD QL SMEAR: ABNORMAL
CALCIUM SERPL-MCNC: 9.4 MG/DL (ref 8.3–10.6)
CANNABINOIDS UR QL SCN>50 NG/ML: ABNORMAL
CHLORIDE SERPL-SCNC: 104 MMOL/L (ref 99–110)
CO2 SERPL-SCNC: 18 MMOL/L (ref 21–32)
COCAINE UR QL SCN: ABNORMAL
CREAT SERPL-MCNC: 0.8 MG/DL (ref 0.9–1.3)
DEPRECATED RDW RBC AUTO: 22 % (ref 12.4–15.4)
DRUG SCREEN COMMENT UR-IMP: ABNORMAL
EOSINOPHIL # BLD: 0.2 K/UL (ref 0–0.6)
EOSINOPHIL NFR BLD: 1 %
FENTANYL SCREEN, URINE: ABNORMAL
GFR SERPLBLD CREATININE-BSD FMLA CKD-EPI: >60 ML/MIN/{1.73_M2}
GLUCOSE SERPL-MCNC: 114 MG/DL (ref 70–99)
HCT VFR BLD AUTO: 24.1 % (ref 40.5–52.5)
HGB BLD-MCNC: 7.9 G/DL (ref 13.5–17.5)
LACTATE BLDV-SCNC: 1.1 MMOL/L (ref 0.4–2)
LYMPHOCYTES # BLD: 3.5 K/UL (ref 1–5.1)
LYMPHOCYTES NFR BLD: 19 %
MACROCYTES BLD QL SMEAR: ABNORMAL
MCH RBC QN AUTO: 35.9 PG (ref 26–34)
MCHC RBC AUTO-ENTMCNC: 33 G/DL (ref 31–36)
MCV RBC AUTO: 108.9 FL (ref 80–100)
METHADONE UR QL SCN>300 NG/ML: ABNORMAL
MONOCYTES # BLD: 1.8 K/UL (ref 0–1.3)
MONOCYTES NFR BLD: 10 %
NEUTROPHILS # BLD: 12.7 K/UL (ref 1.7–7.7)
NEUTROPHILS NFR BLD: 70 %
OPIATES UR QL SCN>300 NG/ML: ABNORMAL
OVALOCYTES BLD QL SMEAR: ABNORMAL
OXYCODONE UR QL SCN: POSITIVE
PATH INTERP BLD-IMP: NO
PCP UR QL SCN>25 NG/ML: ABNORMAL
PH UR STRIP: 6 [PH]
PLATELET # BLD AUTO: 1108 K/UL (ref 135–450)
PLATELET BLD QL SMEAR: ABNORMAL
PMV BLD AUTO: 7.5 FL (ref 5–10.5)
POIKILOCYTOSIS BLD QL SMEAR: ABNORMAL
POLYCHROMASIA BLD QL SMEAR: ABNORMAL
POTASSIUM SERPL-SCNC: 4.1 MMOL/L (ref 3.5–5.1)
PROCALCITONIN SERPL IA-MCNC: 0.11 NG/ML (ref 0–0.15)
RBC # BLD AUTO: 2.21 M/UL (ref 4.2–5.9)
SICKLE CELLS BLD QL SMEAR: ABNORMAL
SLIDE REVIEW: ABNORMAL
SODIUM SERPL-SCNC: 138 MMOL/L (ref 136–145)
STOMATOCYTES BLD QL SMEAR: ABNORMAL
TARGETS BLD QL SMEAR: ABNORMAL
WBC # BLD AUTO: 18.2 K/UL (ref 4–11)

## 2023-05-04 PROCEDURE — 2580000003 HC RX 258: Performed by: INTERNAL MEDICINE

## 2023-05-04 PROCEDURE — 6360000004 HC RX CONTRAST MEDICATION: Performed by: PHYSICIAN ASSISTANT

## 2023-05-04 PROCEDURE — 84145 PROCALCITONIN (PCT): CPT

## 2023-05-04 PROCEDURE — 85025 COMPLETE CBC W/AUTO DIFF WBC: CPT

## 2023-05-04 PROCEDURE — A9579 GAD-BASE MR CONTRAST NOS,1ML: HCPCS | Performed by: PHYSICIAN ASSISTANT

## 2023-05-04 PROCEDURE — 83605 ASSAY OF LACTIC ACID: CPT

## 2023-05-04 PROCEDURE — 1200000000 HC SEMI PRIVATE

## 2023-05-04 PROCEDURE — 80307 DRUG TEST PRSMV CHEM ANLYZR: CPT

## 2023-05-04 PROCEDURE — 6360000002 HC RX W HCPCS: Performed by: INTERNAL MEDICINE

## 2023-05-04 PROCEDURE — 80048 BASIC METABOLIC PNL TOTAL CA: CPT

## 2023-05-04 PROCEDURE — 6370000000 HC RX 637 (ALT 250 FOR IP): Performed by: INTERNAL MEDICINE

## 2023-05-04 PROCEDURE — 6360000002 HC RX W HCPCS: Performed by: NURSE PRACTITIONER

## 2023-05-04 PROCEDURE — 72197 MRI PELVIS W/O & W/DYE: CPT

## 2023-05-04 PROCEDURE — 2580000003 HC RX 258: Performed by: NURSE PRACTITIONER

## 2023-05-04 PROCEDURE — 36415 COLL VENOUS BLD VENIPUNCTURE: CPT

## 2023-05-04 RX ORDER — BISACODYL 5 MG/1
5 TABLET, DELAYED RELEASE ORAL DAILY
Status: DISCONTINUED | OUTPATIENT
Start: 2023-05-04 | End: 2023-05-07 | Stop reason: HOSPADM

## 2023-05-04 RX ORDER — SODIUM CHLORIDE 0.9 % (FLUSH) 0.9 %
10 SYRINGE (ML) INJECTION PRN
Status: DISCONTINUED | OUTPATIENT
Start: 2023-05-04 | End: 2023-05-07 | Stop reason: HOSPADM

## 2023-05-04 RX ORDER — ONDANSETRON 4 MG/1
4 TABLET, ORALLY DISINTEGRATING ORAL EVERY 8 HOURS PRN
Status: DISCONTINUED | OUTPATIENT
Start: 2023-05-04 | End: 2023-05-07 | Stop reason: HOSPADM

## 2023-05-04 RX ORDER — DIPHENHYDRAMINE HYDROCHLORIDE 50 MG/ML
25 INJECTION INTRAMUSCULAR; INTRAVENOUS ONCE
Status: COMPLETED | OUTPATIENT
Start: 2023-05-05 | End: 2023-05-05

## 2023-05-04 RX ORDER — KETOROLAC TROMETHAMINE 30 MG/ML
30 INJECTION, SOLUTION INTRAMUSCULAR; INTRAVENOUS EVERY 6 HOURS PRN
Status: DISCONTINUED | OUTPATIENT
Start: 2023-05-04 | End: 2023-05-04

## 2023-05-04 RX ORDER — ACETAMINOPHEN 325 MG/1
650 TABLET ORAL EVERY 6 HOURS PRN
Status: DISCONTINUED | OUTPATIENT
Start: 2023-05-04 | End: 2023-05-07 | Stop reason: HOSPADM

## 2023-05-04 RX ORDER — KETOROLAC TROMETHAMINE 30 MG/ML
15 INJECTION, SOLUTION INTRAMUSCULAR; INTRAVENOUS EVERY 6 HOURS PRN
Status: DISPENSED | OUTPATIENT
Start: 2023-05-04 | End: 2023-05-05

## 2023-05-04 RX ORDER — ACETAMINOPHEN 650 MG/1
650 SUPPOSITORY RECTAL EVERY 6 HOURS PRN
Status: DISCONTINUED | OUTPATIENT
Start: 2023-05-04 | End: 2023-05-07 | Stop reason: HOSPADM

## 2023-05-04 RX ORDER — SODIUM CHLORIDE 0.9 % (FLUSH) 0.9 %
5-40 SYRINGE (ML) INJECTION EVERY 12 HOURS SCHEDULED
Status: DISCONTINUED | OUTPATIENT
Start: 2023-05-04 | End: 2023-05-07 | Stop reason: HOSPADM

## 2023-05-04 RX ORDER — HYDROXYUREA 500 MG/1
1000 CAPSULE ORAL 2 TIMES DAILY
Status: DISCONTINUED | OUTPATIENT
Start: 2023-05-04 | End: 2023-05-07 | Stop reason: HOSPADM

## 2023-05-04 RX ORDER — SODIUM CHLORIDE 9 MG/ML
INJECTION, SOLUTION INTRAVENOUS PRN
Status: DISCONTINUED | OUTPATIENT
Start: 2023-05-04 | End: 2023-05-07 | Stop reason: HOSPADM

## 2023-05-04 RX ORDER — LEVALBUTEROL 1.25 MG/.5ML
1.25 SOLUTION, CONCENTRATE RESPIRATORY (INHALATION) EVERY 6 HOURS PRN
Status: DISCONTINUED | OUTPATIENT
Start: 2023-05-04 | End: 2023-05-07 | Stop reason: HOSPADM

## 2023-05-04 RX ORDER — ONDANSETRON 2 MG/ML
4 INJECTION INTRAMUSCULAR; INTRAVENOUS EVERY 6 HOURS PRN
Status: DISCONTINUED | OUTPATIENT
Start: 2023-05-04 | End: 2023-05-07 | Stop reason: HOSPADM

## 2023-05-04 RX ORDER — SODIUM CHLORIDE 9 MG/ML
INJECTION, SOLUTION INTRAVENOUS CONTINUOUS
Status: DISCONTINUED | OUTPATIENT
Start: 2023-05-04 | End: 2023-05-07 | Stop reason: HOSPADM

## 2023-05-04 RX ADMIN — OXYCODONE 20 MG: 5 TABLET ORAL at 09:52

## 2023-05-04 RX ADMIN — GADOTERIDOL 16 ML: 279.3 INJECTION, SOLUTION INTRAVENOUS at 16:51

## 2023-05-04 RX ADMIN — OXYCODONE 20 MG: 5 TABLET ORAL at 23:03

## 2023-05-04 RX ADMIN — Medication 10 ML: at 18:16

## 2023-05-04 RX ADMIN — APIXABAN 5 MG: 5 TABLET, FILM COATED ORAL at 20:27

## 2023-05-04 RX ADMIN — KETOROLAC TROMETHAMINE 30 MG: 30 INJECTION, SOLUTION INTRAMUSCULAR at 04:38

## 2023-05-04 RX ADMIN — CEFTRIAXONE SODIUM 1000 MG: 1 INJECTION, POWDER, FOR SOLUTION INTRAMUSCULAR; INTRAVENOUS at 03:09

## 2023-05-04 RX ADMIN — Medication 10 ML: at 12:01

## 2023-05-04 RX ADMIN — APIXABAN 5 MG: 5 TABLET, FILM COATED ORAL at 07:34

## 2023-05-04 RX ADMIN — METHOCARBAMOL 1000 MG: 100 INJECTION INTRAMUSCULAR; INTRAVENOUS at 20:49

## 2023-05-04 RX ADMIN — KETOROLAC TROMETHAMINE 30 MG: 30 INJECTION, SOLUTION INTRAMUSCULAR at 12:01

## 2023-05-04 RX ADMIN — KETOROLAC TROMETHAMINE 30 MG: 30 INJECTION, SOLUTION INTRAMUSCULAR at 18:16

## 2023-05-04 RX ADMIN — HYDROXYUREA 1000 MG: 500 CAPSULE ORAL at 11:20

## 2023-05-04 RX ADMIN — HYDROMORPHONE HYDROCHLORIDE 0.5 MG: 1 INJECTION, SOLUTION INTRAMUSCULAR; INTRAVENOUS; SUBCUTANEOUS at 06:08

## 2023-05-04 RX ADMIN — OXYCODONE 20 MG: 5 TABLET ORAL at 03:00

## 2023-05-04 RX ADMIN — ACETAMINOPHEN 650 MG: 500 TABLET ORAL at 02:59

## 2023-05-04 RX ADMIN — SODIUM CHLORIDE: 9 INJECTION, SOLUTION INTRAVENOUS at 18:16

## 2023-05-04 RX ADMIN — OXYCODONE 20 MG: 5 TABLET ORAL at 17:16

## 2023-05-04 RX ADMIN — SODIUM CHLORIDE: 9 INJECTION, SOLUTION INTRAVENOUS at 03:05

## 2023-05-04 RX ADMIN — BISACODYL 5 MG: 5 TABLET, COATED ORAL at 07:34

## 2023-05-04 RX ADMIN — HYDROXYUREA 1000 MG: 500 CAPSULE ORAL at 20:27

## 2023-05-04 ASSESSMENT — PAIN DESCRIPTION - DESCRIPTORS: DESCRIPTORS: PRESSURE

## 2023-05-04 ASSESSMENT — PAIN SCALES - GENERAL
PAINLEVEL_OUTOF10: 10
PAINLEVEL_OUTOF10: 9
PAINLEVEL_OUTOF10: 10
PAINLEVEL_OUTOF10: 10
PAINLEVEL_OUTOF10: 8
PAINLEVEL_OUTOF10: 10
PAINLEVEL_OUTOF10: 9

## 2023-05-04 ASSESSMENT — PAIN DESCRIPTION - LOCATION
LOCATION: PENIS
LOCATION: GROIN
LOCATION: PENIS

## 2023-05-04 ASSESSMENT — PAIN DESCRIPTION - ONSET: ONSET: GRADUAL

## 2023-05-04 ASSESSMENT — PAIN DESCRIPTION - FREQUENCY: FREQUENCY: CONTINUOUS

## 2023-05-04 ASSESSMENT — PAIN - FUNCTIONAL ASSESSMENT: PAIN_FUNCTIONAL_ASSESSMENT: PREVENTS OR INTERFERES SOME ACTIVE ACTIVITIES AND ADLS

## 2023-05-04 ASSESSMENT — PAIN DESCRIPTION - PAIN TYPE: TYPE: ACUTE PAIN

## 2023-05-04 ASSESSMENT — PAIN DESCRIPTION - ORIENTATION: ORIENTATION: MID

## 2023-05-05 ENCOUNTER — ANESTHESIA (OUTPATIENT)
Dept: OPERATING ROOM | Age: 23
End: 2023-05-05
Payer: COMMERCIAL

## 2023-05-05 ENCOUNTER — ANESTHESIA EVENT (OUTPATIENT)
Dept: OPERATING ROOM | Age: 23
End: 2023-05-05
Payer: COMMERCIAL

## 2023-05-05 LAB
ANION GAP SERPL CALCULATED.3IONS-SCNC: 10 MMOL/L (ref 3–16)
ANISOCYTOSIS BLD QL SMEAR: ABNORMAL
BASOPHILS # BLD: 0 K/UL (ref 0–0.2)
BASOPHILS NFR BLD: 0 %
BUN SERPL-MCNC: 11 MG/DL (ref 7–20)
CALCIUM SERPL-MCNC: 9.3 MG/DL (ref 8.3–10.6)
CHLORIDE SERPL-SCNC: 108 MMOL/L (ref 99–110)
CO2 SERPL-SCNC: 23 MMOL/L (ref 21–32)
CREAT SERPL-MCNC: 1 MG/DL (ref 0.9–1.3)
DEPRECATED RDW RBC AUTO: 20.5 % (ref 12.4–15.4)
EOSINOPHIL # BLD: 0.3 K/UL (ref 0–0.6)
EOSINOPHIL NFR BLD: 3 %
GFR SERPLBLD CREATININE-BSD FMLA CKD-EPI: >60 ML/MIN/{1.73_M2}
GLUCOSE SERPL-MCNC: 97 MG/DL (ref 70–99)
HCT VFR BLD AUTO: 25.4 % (ref 40.5–52.5)
HGB BLD-MCNC: 8.5 G/DL (ref 13.5–17.5)
LYMPHOCYTES # BLD: 2.9 K/UL (ref 1–5.1)
LYMPHOCYTES NFR BLD: 28 %
MACROCYTES BLD QL SMEAR: ABNORMAL
MCH RBC QN AUTO: 36.3 PG (ref 26–34)
MCHC RBC AUTO-ENTMCNC: 33.4 G/DL (ref 31–36)
MCV RBC AUTO: 108.9 FL (ref 80–100)
MICROCYTES BLD QL SMEAR: ABNORMAL
MONOCYTES # BLD: 0.8 K/UL (ref 0–1.3)
MONOCYTES NFR BLD: 8 %
NEUTROPHILS # BLD: 6.3 K/UL (ref 1.7–7.7)
NEUTROPHILS NFR BLD: 61 %
NEUTS HYPERSEG BLD QL SMEAR: PRESENT
NRBC BLD-RTO: 1 /100 WBC
PATH INTERP BLD-IMP: NORMAL
PATH INTERP BLD-IMP: YES
PLATELET # BLD AUTO: 1198 K/UL (ref 135–450)
PLATELET BLD QL SMEAR: ABNORMAL
PMV BLD AUTO: 7.4 FL (ref 5–10.5)
POIKILOCYTOSIS BLD QL SMEAR: ABNORMAL
POTASSIUM SERPL-SCNC: 4.4 MMOL/L (ref 3.5–5.1)
RBC # BLD AUTO: 2.34 M/UL (ref 4.2–5.9)
SICKLE CELLS BLD QL SMEAR: ABNORMAL
SLIDE REVIEW: ABNORMAL
SODIUM SERPL-SCNC: 141 MMOL/L (ref 136–145)
TARGETS BLD QL SMEAR: ABNORMAL
WBC # BLD AUTO: 10.4 K/UL (ref 4–11)

## 2023-05-05 PROCEDURE — 80048 BASIC METABOLIC PNL TOTAL CA: CPT

## 2023-05-05 PROCEDURE — 1200000000 HC SEMI PRIVATE

## 2023-05-05 PROCEDURE — 6360000002 HC RX W HCPCS: Performed by: INTERNAL MEDICINE

## 2023-05-05 PROCEDURE — 6370000000 HC RX 637 (ALT 250 FOR IP): Performed by: ANESTHESIOLOGY

## 2023-05-05 PROCEDURE — 2580000003 HC RX 258: Performed by: ANESTHESIOLOGY

## 2023-05-05 PROCEDURE — 3700000000 HC ANESTHESIA ATTENDED CARE: Performed by: UROLOGY

## 2023-05-05 PROCEDURE — 3600000003 HC SURGERY LEVEL 3 BASE: Performed by: UROLOGY

## 2023-05-05 PROCEDURE — A4217 STERILE WATER/SALINE, 500 ML: HCPCS | Performed by: UROLOGY

## 2023-05-05 PROCEDURE — 2580000003 HC RX 258: Performed by: INTERNAL MEDICINE

## 2023-05-05 PROCEDURE — 3600000013 HC SURGERY LEVEL 3 ADDTL 15MIN: Performed by: UROLOGY

## 2023-05-05 PROCEDURE — 6360000002 HC RX W HCPCS: Performed by: UROLOGY

## 2023-05-05 PROCEDURE — 6360000002 HC RX W HCPCS: Performed by: NURSE PRACTITIONER

## 2023-05-05 PROCEDURE — 7100000000 HC PACU RECOVERY - FIRST 15 MIN: Performed by: UROLOGY

## 2023-05-05 PROCEDURE — 6370000000 HC RX 637 (ALT 250 FOR IP): Performed by: INTERNAL MEDICINE

## 2023-05-05 PROCEDURE — 0V950ZZ DRAINAGE OF SCROTUM, OPEN APPROACH: ICD-10-PCS | Performed by: UROLOGY

## 2023-05-05 PROCEDURE — 85025 COMPLETE CBC W/AUTO DIFF WBC: CPT

## 2023-05-05 PROCEDURE — 2709999900 HC NON-CHARGEABLE SUPPLY: Performed by: UROLOGY

## 2023-05-05 PROCEDURE — 87075 CULTR BACTERIA EXCEPT BLOOD: CPT

## 2023-05-05 PROCEDURE — 87205 SMEAR GRAM STAIN: CPT

## 2023-05-05 PROCEDURE — 6360000002 HC RX W HCPCS: Performed by: ANESTHESIOLOGY

## 2023-05-05 PROCEDURE — 3700000001 HC ADD 15 MINUTES (ANESTHESIA): Performed by: UROLOGY

## 2023-05-05 PROCEDURE — 2580000003 HC RX 258: Performed by: UROLOGY

## 2023-05-05 PROCEDURE — 87070 CULTURE OTHR SPECIMN AEROBIC: CPT

## 2023-05-05 PROCEDURE — 2500000003 HC RX 250 WO HCPCS: Performed by: ANESTHESIOLOGY

## 2023-05-05 PROCEDURE — 2500000003 HC RX 250 WO HCPCS: Performed by: UROLOGY

## 2023-05-05 PROCEDURE — 7100000001 HC PACU RECOVERY - ADDTL 15 MIN: Performed by: UROLOGY

## 2023-05-05 RX ORDER — SODIUM CHLORIDE 0.9 % (FLUSH) 0.9 %
5-40 SYRINGE (ML) INJECTION EVERY 12 HOURS SCHEDULED
Status: DISCONTINUED | OUTPATIENT
Start: 2023-05-05 | End: 2023-05-05 | Stop reason: HOSPADM

## 2023-05-05 RX ORDER — SODIUM CHLORIDE 0.9 % (FLUSH) 0.9 %
5-40 SYRINGE (ML) INJECTION PRN
Status: DISCONTINUED | OUTPATIENT
Start: 2023-05-05 | End: 2023-05-05 | Stop reason: HOSPADM

## 2023-05-05 RX ORDER — MEPERIDINE HYDROCHLORIDE 50 MG/ML
12.5 INJECTION INTRAMUSCULAR; INTRAVENOUS; SUBCUTANEOUS EVERY 5 MIN PRN
Status: DISCONTINUED | OUTPATIENT
Start: 2023-05-05 | End: 2023-05-05 | Stop reason: HOSPADM

## 2023-05-05 RX ORDER — OXYCODONE HYDROCHLORIDE 5 MG/1
10 TABLET ORAL PRN
Status: COMPLETED | OUTPATIENT
Start: 2023-05-05 | End: 2023-05-05

## 2023-05-05 RX ORDER — KETOROLAC TROMETHAMINE 30 MG/ML
15 INJECTION, SOLUTION INTRAMUSCULAR; INTRAVENOUS EVERY 6 HOURS PRN
Status: DISCONTINUED | OUTPATIENT
Start: 2023-05-05 | End: 2023-05-07 | Stop reason: HOSPADM

## 2023-05-05 RX ORDER — DIPHENHYDRAMINE HYDROCHLORIDE 50 MG/ML
12.5 INJECTION INTRAMUSCULAR; INTRAVENOUS
Status: DISCONTINUED | OUTPATIENT
Start: 2023-05-05 | End: 2023-05-05 | Stop reason: HOSPADM

## 2023-05-05 RX ORDER — ONDANSETRON 2 MG/ML
4 INJECTION INTRAMUSCULAR; INTRAVENOUS
Status: DISCONTINUED | OUTPATIENT
Start: 2023-05-05 | End: 2023-05-05 | Stop reason: HOSPADM

## 2023-05-05 RX ORDER — SODIUM CHLORIDE 9 MG/ML
INJECTION, SOLUTION INTRAVENOUS CONTINUOUS PRN
Status: DISCONTINUED | OUTPATIENT
Start: 2023-05-05 | End: 2023-05-05 | Stop reason: SDUPTHER

## 2023-05-05 RX ORDER — OXYCODONE HYDROCHLORIDE 5 MG/1
5 TABLET ORAL PRN
Status: COMPLETED | OUTPATIENT
Start: 2023-05-05 | End: 2023-05-05

## 2023-05-05 RX ORDER — MAGNESIUM HYDROXIDE 1200 MG/15ML
LIQUID ORAL CONTINUOUS PRN
Status: COMPLETED | OUTPATIENT
Start: 2023-05-05 | End: 2023-05-05

## 2023-05-05 RX ORDER — LIDOCAINE HYDROCHLORIDE 20 MG/ML
INJECTION, SOLUTION INFILTRATION; PERINEURAL PRN
Status: DISCONTINUED | OUTPATIENT
Start: 2023-05-05 | End: 2023-05-05 | Stop reason: SDUPTHER

## 2023-05-05 RX ORDER — DIPHENHYDRAMINE HYDROCHLORIDE 50 MG/ML
12.5 INJECTION INTRAMUSCULAR; INTRAVENOUS EVERY 6 HOURS PRN
Status: DISCONTINUED | OUTPATIENT
Start: 2023-05-05 | End: 2023-05-07 | Stop reason: HOSPADM

## 2023-05-05 RX ORDER — BUPIVACAINE HYDROCHLORIDE 5 MG/ML
INJECTION, SOLUTION EPIDURAL; INTRACAUDAL PRN
Status: DISCONTINUED | OUTPATIENT
Start: 2023-05-05 | End: 2023-05-05 | Stop reason: ALTCHOICE

## 2023-05-05 RX ORDER — SODIUM CHLORIDE 9 MG/ML
25 INJECTION, SOLUTION INTRAVENOUS PRN
Status: DISCONTINUED | OUTPATIENT
Start: 2023-05-05 | End: 2023-05-05 | Stop reason: HOSPADM

## 2023-05-05 RX ORDER — PROPOFOL 10 MG/ML
INJECTION, EMULSION INTRAVENOUS PRN
Status: DISCONTINUED | OUTPATIENT
Start: 2023-05-05 | End: 2023-05-05 | Stop reason: SDUPTHER

## 2023-05-05 RX ORDER — LABETALOL HYDROCHLORIDE 5 MG/ML
10 INJECTION, SOLUTION INTRAVENOUS
Status: DISCONTINUED | OUTPATIENT
Start: 2023-05-05 | End: 2023-05-05 | Stop reason: HOSPADM

## 2023-05-05 RX ADMIN — HYDROMORPHONE HYDROCHLORIDE 0.5 MG: 1 INJECTION, SOLUTION INTRAMUSCULAR; INTRAVENOUS; SUBCUTANEOUS at 09:31

## 2023-05-05 RX ADMIN — HYDROMORPHONE HYDROCHLORIDE 0.5 MG: 1 INJECTION, SOLUTION INTRAMUSCULAR; INTRAVENOUS; SUBCUTANEOUS at 22:52

## 2023-05-05 RX ADMIN — PROPOFOL 50 MG: 10 INJECTION, EMULSION INTRAVENOUS at 21:46

## 2023-05-05 RX ADMIN — PROPOFOL 150 MG: 10 INJECTION, EMULSION INTRAVENOUS at 21:43

## 2023-05-05 RX ADMIN — HYDROXYUREA 1000 MG: 500 CAPSULE ORAL at 23:09

## 2023-05-05 RX ADMIN — OXYCODONE 20 MG: 5 TABLET ORAL at 17:34

## 2023-05-05 RX ADMIN — APIXABAN 5 MG: 5 TABLET, FILM COATED ORAL at 07:40

## 2023-05-05 RX ADMIN — OXYCODONE 20 MG: 5 TABLET ORAL at 05:04

## 2023-05-05 RX ADMIN — PROPOFOL 50 MG: 10 INJECTION, EMULSION INTRAVENOUS at 21:51

## 2023-05-05 RX ADMIN — PROPOFOL 150 MG: 10 INJECTION, EMULSION INTRAVENOUS at 21:39

## 2023-05-05 RX ADMIN — HYDROMORPHONE HYDROCHLORIDE 0.5 MG: 1 INJECTION, SOLUTION INTRAMUSCULAR; INTRAVENOUS; SUBCUTANEOUS at 22:59

## 2023-05-05 RX ADMIN — Medication 10 ML: at 23:13

## 2023-05-05 RX ADMIN — PROPOFOL 50 MG: 10 INJECTION, EMULSION INTRAVENOUS at 21:56

## 2023-05-05 RX ADMIN — KETOROLAC TROMETHAMINE 15 MG: 30 INJECTION, SOLUTION INTRAMUSCULAR at 00:02

## 2023-05-05 RX ADMIN — SODIUM CHLORIDE: 9 INJECTION, SOLUTION INTRAVENOUS at 21:34

## 2023-05-05 RX ADMIN — KETOROLAC TROMETHAMINE 15 MG: 30 INJECTION, SOLUTION INTRAMUSCULAR at 15:34

## 2023-05-05 RX ADMIN — PROPOFOL 50 MG: 10 INJECTION, EMULSION INTRAVENOUS at 21:49

## 2023-05-05 RX ADMIN — SODIUM CHLORIDE: 9 INJECTION, SOLUTION INTRAVENOUS at 09:32

## 2023-05-05 RX ADMIN — CEFTRIAXONE SODIUM 1000 MG: 1 INJECTION, POWDER, FOR SOLUTION INTRAMUSCULAR; INTRAVENOUS at 01:26

## 2023-05-05 RX ADMIN — DIPHENHYDRAMINE HYDROCHLORIDE 12.5 MG: 50 INJECTION, SOLUTION INTRAMUSCULAR; INTRAVENOUS at 01:55

## 2023-05-05 RX ADMIN — OXYCODONE 20 MG: 5 TABLET ORAL at 11:42

## 2023-05-05 RX ADMIN — PROPOFOL 50 MG: 10 INJECTION, EMULSION INTRAVENOUS at 22:04

## 2023-05-05 RX ADMIN — DIPHENHYDRAMINE HYDROCHLORIDE 25 MG: 50 INJECTION, SOLUTION INTRAMUSCULAR; INTRAVENOUS at 00:02

## 2023-05-05 RX ADMIN — HYDROXYUREA 1000 MG: 500 CAPSULE ORAL at 07:46

## 2023-05-05 RX ADMIN — HYDROMORPHONE HYDROCHLORIDE 0.5 MG: 1 INJECTION, SOLUTION INTRAMUSCULAR; INTRAVENOUS; SUBCUTANEOUS at 21:00

## 2023-05-05 RX ADMIN — BISACODYL 5 MG: 5 TABLET, COATED ORAL at 07:40

## 2023-05-05 RX ADMIN — Medication 10 ML: at 07:40

## 2023-05-05 RX ADMIN — LIDOCAINE HYDROCHLORIDE 5 ML: 20 INJECTION, SOLUTION INFILTRATION; PERINEURAL at 21:34

## 2023-05-05 RX ADMIN — PROPOFOL 50 MG: 10 INJECTION, EMULSION INTRAVENOUS at 21:54

## 2023-05-05 RX ADMIN — PROPOFOL 50 MG: 10 INJECTION, EMULSION INTRAVENOUS at 22:01

## 2023-05-05 RX ADMIN — OXYCODONE 10 MG: 5 TABLET ORAL at 22:45

## 2023-05-05 ASSESSMENT — PAIN SCALES - GENERAL
PAINLEVEL_OUTOF10: 10
PAINLEVEL_OUTOF10: 10
PAINLEVEL_OUTOF10: 9
PAINLEVEL_OUTOF10: 10
PAINLEVEL_OUTOF10: 7

## 2023-05-05 ASSESSMENT — PAIN SCALES - WONG BAKER
WONGBAKER_NUMERICALRESPONSE: 0

## 2023-05-05 ASSESSMENT — PAIN DESCRIPTION - ORIENTATION: ORIENTATION: MID

## 2023-05-05 ASSESSMENT — PAIN DESCRIPTION - LOCATION: LOCATION: GROIN

## 2023-05-05 ASSESSMENT — PAIN DESCRIPTION - DESCRIPTORS: DESCRIPTORS: ACHING

## 2023-05-06 LAB
ANION GAP SERPL CALCULATED.3IONS-SCNC: 9 MMOL/L (ref 3–16)
BASOPHILS # BLD: 0 K/UL (ref 0–0.2)
BASOPHILS NFR BLD: 0.4 %
BUN SERPL-MCNC: 8 MG/DL (ref 7–20)
CALCIUM SERPL-MCNC: 8.9 MG/DL (ref 8.3–10.6)
CHLORIDE SERPL-SCNC: 107 MMOL/L (ref 99–110)
CO2 SERPL-SCNC: 25 MMOL/L (ref 21–32)
CREAT SERPL-MCNC: 0.8 MG/DL (ref 0.9–1.3)
DEPRECATED RDW RBC AUTO: 19.7 % (ref 12.4–15.4)
EOSINOPHIL # BLD: 0.2 K/UL (ref 0–0.6)
EOSINOPHIL NFR BLD: 2.9 %
GFR SERPLBLD CREATININE-BSD FMLA CKD-EPI: >60 ML/MIN/{1.73_M2}
GLUCOSE SERPL-MCNC: 106 MG/DL (ref 70–99)
HCT VFR BLD AUTO: 23 % (ref 40.5–52.5)
HGB BLD-MCNC: 7.5 G/DL (ref 13.5–17.5)
LYMPHOCYTES # BLD: 3 K/UL (ref 1–5.1)
LYMPHOCYTES NFR BLD: 38.7 %
MCH RBC QN AUTO: 35.4 PG (ref 26–34)
MCHC RBC AUTO-ENTMCNC: 32.8 G/DL (ref 31–36)
MCV RBC AUTO: 108.2 FL (ref 80–100)
MONOCYTES # BLD: 0.9 K/UL (ref 0–1.3)
MONOCYTES NFR BLD: 10.9 %
NEUTROPHILS # BLD: 3.7 K/UL (ref 1.7–7.7)
NEUTROPHILS NFR BLD: 47.1 %
PATH INTERP BLD-IMP: NO
PLATELET # BLD AUTO: 1033 K/UL (ref 135–450)
PMV BLD AUTO: 7.6 FL (ref 5–10.5)
POTASSIUM SERPL-SCNC: 3.9 MMOL/L (ref 3.5–5.1)
RBC # BLD AUTO: 2.12 M/UL (ref 4.2–5.9)
SODIUM SERPL-SCNC: 141 MMOL/L (ref 136–145)
WBC # BLD AUTO: 7.8 K/UL (ref 4–11)

## 2023-05-06 PROCEDURE — 6370000000 HC RX 637 (ALT 250 FOR IP): Performed by: INTERNAL MEDICINE

## 2023-05-06 PROCEDURE — 2580000003 HC RX 258: Performed by: INTERNAL MEDICINE

## 2023-05-06 PROCEDURE — 36415 COLL VENOUS BLD VENIPUNCTURE: CPT

## 2023-05-06 PROCEDURE — 6360000002 HC RX W HCPCS: Performed by: INTERNAL MEDICINE

## 2023-05-06 PROCEDURE — 80048 BASIC METABOLIC PNL TOTAL CA: CPT

## 2023-05-06 PROCEDURE — 85025 COMPLETE CBC W/AUTO DIFF WBC: CPT

## 2023-05-06 PROCEDURE — 1200000000 HC SEMI PRIVATE

## 2023-05-06 RX ADMIN — HYDROXYUREA 1000 MG: 500 CAPSULE ORAL at 08:39

## 2023-05-06 RX ADMIN — KETOROLAC TROMETHAMINE 15 MG: 30 INJECTION, SOLUTION INTRAMUSCULAR at 08:39

## 2023-05-06 RX ADMIN — HYDROMORPHONE HYDROCHLORIDE 0.5 MG: 1 INJECTION, SOLUTION INTRAMUSCULAR; INTRAVENOUS; SUBCUTANEOUS at 11:17

## 2023-05-06 RX ADMIN — OXYCODONE HYDROCHLORIDE 20 MG: 15 TABLET ORAL at 21:40

## 2023-05-06 RX ADMIN — KETOROLAC TROMETHAMINE 15 MG: 30 INJECTION, SOLUTION INTRAMUSCULAR at 14:44

## 2023-05-06 RX ADMIN — HYDROMORPHONE HYDROCHLORIDE 0.5 MG: 1 INJECTION, SOLUTION INTRAMUSCULAR; INTRAVENOUS; SUBCUTANEOUS at 18:17

## 2023-05-06 RX ADMIN — OXYCODONE 20 MG: 5 TABLET ORAL at 09:38

## 2023-05-06 RX ADMIN — Medication 10 ML: at 08:46

## 2023-05-06 RX ADMIN — BISACODYL 5 MG: 5 TABLET, COATED ORAL at 08:39

## 2023-05-06 RX ADMIN — SODIUM CHLORIDE: 9 INJECTION, SOLUTION INTRAVENOUS at 08:38

## 2023-05-06 RX ADMIN — KETOROLAC TROMETHAMINE 15 MG: 30 INJECTION, SOLUTION INTRAMUSCULAR at 01:07

## 2023-05-06 RX ADMIN — SODIUM CHLORIDE: 9 INJECTION, SOLUTION INTRAVENOUS at 21:42

## 2023-05-06 RX ADMIN — HYDROXYUREA 1000 MG: 500 CAPSULE ORAL at 21:35

## 2023-05-06 RX ADMIN — Medication 10 ML: at 21:46

## 2023-05-06 RX ADMIN — OXYCODONE 20 MG: 5 TABLET ORAL at 01:54

## 2023-05-06 RX ADMIN — HYDROMORPHONE HYDROCHLORIDE 0.5 MG: 1 INJECTION, SOLUTION INTRAMUSCULAR; INTRAVENOUS; SUBCUTANEOUS at 05:14

## 2023-05-06 RX ADMIN — OXYCODONE 20 MG: 5 TABLET ORAL at 16:09

## 2023-05-06 ASSESSMENT — PAIN DESCRIPTION - DESCRIPTORS
DESCRIPTORS: ACHING

## 2023-05-06 ASSESSMENT — PAIN SCALES - GENERAL
PAINLEVEL_OUTOF10: 10
PAINLEVEL_OUTOF10: 10
PAINLEVEL_OUTOF10: 8
PAINLEVEL_OUTOF10: 8
PAINLEVEL_OUTOF10: 10
PAINLEVEL_OUTOF10: 9
PAINLEVEL_OUTOF10: 10

## 2023-05-06 ASSESSMENT — PAIN DESCRIPTION - ORIENTATION
ORIENTATION: LOWER

## 2023-05-06 ASSESSMENT — PAIN DESCRIPTION - LOCATION
LOCATION: GROIN
LOCATION: SCROTUM
LOCATION: PENIS
LOCATION: GROIN
LOCATION: PENIS

## 2023-05-06 ASSESSMENT — PAIN DESCRIPTION - FREQUENCY: FREQUENCY: CONTINUOUS

## 2023-05-06 NOTE — ANESTHESIA PRE PROCEDURE
Department of Anesthesiology  Preprocedure Note       Name:  Heraclio Reyes   Age:  20 y.o.  :  2000                                          MRN:  [id number]         Date:  2023      Surgeon: Berta Gomez):  Cristina Bruno MD    Procedure: Procedure(s):  INCISION AND DRAINAGE OF PENIS AND SCROTUM    Medications prior to admission:   Prior to Admission medications    Medication Sig Start Date End Date Taking? Authorizing Provider   oxyCODONE (ROXICODONE) 20 MG immediate release tablet Take 1 tablet by mouth every 6 hours as needed for Pain for up to 7 days. Max Daily Amount: 80 mg 23  Jared Hidalgo MD   apixaban starter pack (ELIQUIS) 5 MG TBPK tablet Take 2 tablets by mouth 2 times daily for 7 days, THEN 1 tablet 2 times daily for 23 days.  Patient will take 2 tablets bid for 6.5 days as he had his first dose of Elqiuis in the hospital.  Patient not taking: Reported on 2023 2/18/23 3/20/23  Kendra Milton MD   hydroxyurea (HYDREA) 500 MG chemo capsule Take 2 capsules by mouth 2 times daily 23   Feliciaurine Husbands, MD   pseudoephedrine (SUDAFED) 60 MG tablet Take 1 tablet by mouth daily as needed (priapism) 23   Benito Husbands, MD   albuterol sulfate HFA (PROVENTIL;VENTOLIN;PROAIR) 108 (90 Base) MCG/ACT inhaler Albuterol HFA Inhaler 90 mcg/actuation  inhaled  2 puffs prn     Active    Historical Provider, MD   levalbuterol (XOPENEX) 0.31 MG/3ML nebulization Levalbuterol Nebulized    2 puffs prn     Active    Historical Provider, MD       Current medications:    Current Facility-Administered Medications   Medication Dose Route Frequency Provider Last Rate Last Admin    diphenhydrAMINE (BENADRYL) injection 12.5 mg  12.5 mg IntraVENous Q6H PRN CARMELITA Wiley - CNP   12.5 mg at 23 0155    HYDROmorphone (DILAUDID) injection 0.5 mg  0.5 mg IntraVENous Q6H PRN Bakari Wolf MD   0.5 mg at 23 0931    ketorolac (TORADOL) injection 15 mg  15 mg

## 2023-05-07 VITALS
HEIGHT: 69 IN | WEIGHT: 190.2 LBS | SYSTOLIC BLOOD PRESSURE: 110 MMHG | DIASTOLIC BLOOD PRESSURE: 65 MMHG | OXYGEN SATURATION: 99 % | HEART RATE: 54 BPM | TEMPERATURE: 97.6 F | BODY MASS INDEX: 28.17 KG/M2 | RESPIRATION RATE: 16 BRPM

## 2023-05-07 LAB — BACTERIA BLD CULT: NORMAL

## 2023-05-07 PROCEDURE — 2580000003 HC RX 258: Performed by: INTERNAL MEDICINE

## 2023-05-07 PROCEDURE — 6360000002 HC RX W HCPCS: Performed by: INTERNAL MEDICINE

## 2023-05-07 PROCEDURE — 6370000000 HC RX 637 (ALT 250 FOR IP): Performed by: INTERNAL MEDICINE

## 2023-05-07 RX ORDER — OXYCODONE HYDROCHLORIDE 20 MG/1
20 TABLET ORAL EVERY 6 HOURS PRN
Qty: 20 TABLET | Refills: 0 | Status: SHIPPED | OUTPATIENT
Start: 2023-05-07 | End: 2023-05-12

## 2023-05-07 RX ORDER — CEFUROXIME AXETIL 250 MG/1
250 TABLET ORAL 2 TIMES DAILY
Qty: 14 TABLET | Refills: 0 | Status: SHIPPED | OUTPATIENT
Start: 2023-05-07 | End: 2023-05-14

## 2023-05-07 RX ADMIN — OXYCODONE HYDROCHLORIDE 20 MG: 15 TABLET ORAL at 10:15

## 2023-05-07 RX ADMIN — OXYCODONE HYDROCHLORIDE 20 MG: 15 TABLET ORAL at 04:08

## 2023-05-07 RX ADMIN — HYDROMORPHONE HYDROCHLORIDE 0.5 MG: 1 INJECTION, SOLUTION INTRAMUSCULAR; INTRAVENOUS; SUBCUTANEOUS at 06:23

## 2023-05-07 RX ADMIN — BISACODYL 5 MG: 5 TABLET, COATED ORAL at 08:29

## 2023-05-07 RX ADMIN — Medication 10 ML: at 08:29

## 2023-05-07 RX ADMIN — HYDROMORPHONE HYDROCHLORIDE 0.5 MG: 1 INJECTION, SOLUTION INTRAMUSCULAR; INTRAVENOUS; SUBCUTANEOUS at 00:26

## 2023-05-07 RX ADMIN — HYDROXYUREA 1000 MG: 500 CAPSULE ORAL at 08:29

## 2023-05-07 RX ADMIN — CEFTRIAXONE SODIUM 1000 MG: 1 INJECTION, POWDER, FOR SOLUTION INTRAMUSCULAR; INTRAVENOUS at 00:34

## 2023-05-07 ASSESSMENT — PAIN DESCRIPTION - LOCATION
LOCATION: PENIS

## 2023-05-07 ASSESSMENT — PAIN DESCRIPTION - DESCRIPTORS
DESCRIPTORS: ACHING

## 2023-05-07 ASSESSMENT — PAIN SCALES - GENERAL
PAINLEVEL_OUTOF10: 8
PAINLEVEL_OUTOF10: 0
PAINLEVEL_OUTOF10: 8

## 2023-05-10 NOTE — PROGRESS NOTES
Physician Progress Note      PATIENT:               Farrah Coughlin  CSN #:                  [id number]  :                       2000  ADMIT DATE:       5/3/2023 7:34 PM  [address] DATE:        2023 12:34 PM  RESPONDING  PROVIDER #:        Felicia Garcia MD          QUERY TEXT:    Pt admitted with penoscrotal hematoma and underwent corporal decompression for   prolonged refractory ischemic priapism on 2023. ? 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 -- Other - I will add my own diagnosis  -- Disagree - Not applicable / Not valid  -- Disagree - Clinically unable to determine / Unknown  -- Refer to Clinical Documentation Reviewer    PROVIDER RESPONSE TEXT:    Patient has hematoma due to the procedure.     Query created by: Allison Jackman on 5/10/2023 8:36 AM      Electronically signed by:  Felicia Garcia MD 5/10/2023 10:09 AM

## 2023-05-11 LAB
BACTERIA SPEC AEROBE CULT: NORMAL
BACTERIA SPEC ANAEROBE CULT: NORMAL
GRAM STN SPEC: NORMAL

## 2023-06-04 NOTE — CARE COORDINATION
Referred to patient for frequent visits. Patient with frequent visits for pain. Patient well known to ER staff. This  spoke to [facility] case Eddie Batista last month. They have patient on pain management. They report patient is able to get his medications one time a week. Patient otherwise takes too much of his pain medications at home. [facility] is agreeable to pain management plan and to work with hospital to reduce hospitalizations. Spoke to MD, early concern for low o2 sats, however oxygen is normal and patient can be d/c'd. Follow up appointment made to [facility] for next Friday. Message left to have  at [facility] to return call. Spoke to patient. He lives at home with parents. Patient reports he is independent in ADLs. Discussed follow up appointment. Also discussed patient has  at [facility]. Discussed concern for frequent hospital visits for pain management. Will continue to follow for d/c needs.   Electronically signed by SOLEDAD García LISW-S on 3/17/2022 at 10:10 AM No

## 2023-07-26 ENCOUNTER — CLINICAL DOCUMENTATION (OUTPATIENT)
Dept: CASE MANAGEMENT | Age: 23
End: 2023-07-26

## 2023-07-26 NOTE — MANAGEMENT PLAN
Patient Management Plan              Pt. Name: Brent Byers  : 2000        Date plan entered: 22        Patients Physicians:     Primary Care  : Nixon Rollins MD  Contact #:  [phone]  Specialists:    Contact #:                                                   Summary        Reason for Referral: This patient has been provided a management plan for Chronic Pain, Medical Needs and Social Needs                 Patient has had frequent visits for:     Accidental overdose   Asthma   DVT (deep venous thrombosis) (HCC)   R leg   Enlarged heart   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)   Sickle cell anemia ([address])      Please note has been d/c'd from [facility] to see  [facility]         Warnings/Safety Alerts:           Goals/Interventions:   Check for 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 Maame Richey in Urology for follow up  Limit narcotics, 1 dose of Toradol 50 mg IV, Tylenol for chronic pain issues  Chest pain - troponin, possible CT pulmonary  Refer to [facility], [phone],  Kelli Bowens  CBC, CMP, sed rate, reticulocyte  XR as needed   No prescriptions for narcotics  Contact [facility] for pain management plan while inpatient, has been d/c'd from [facility] due to noncompliance. Supposed to see  [facility]. [phone]            During Business Hours:      After Hours:               Situation: Chronic Conditions Summary:      Accidental overdose   Asthma   DVT (deep venous thrombosis) (HCC)   R leg   Enlarged heart   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)   Sickle cell anemia (HCC)             Challenges for Self Management: Utilization: ED/Hospital admissions and Behavioral Health Factors (excessive alcohol, recreational drug use, high risk behaviors)             Medication Management: Poor Adherence and Poly-pharm               Advanced Care Plan: None                  Management Plan entered by: BC Bran              * This

## 2023-09-01 NOTE — ED NOTES
----- Message from Lisa Suarez RN sent at 9/1/2023 11:41 AM CDT -----  Regarding: Patient care  Hi I wanted to reach out regarding this patient.  She was here to recheck her INR today and she mentioned some unintentional weight loss and a cough.  I did advise for her to call about this but she multiple times through out our appointment stated she has this cough, its getting better, its not as bad as last week and she thinks its allergies.      She also shows confusion with her medications and I do see the notes from her last visit with you.  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  Please see my notes with Anticoag today, I helped her with her medications and she was taking 3 losartan instead of 2 thinking the lostartan was Zyrtec because some how that was what was in her bottle of Zyrtec.  Patient asked about a nurse coming out to the house to help with medications, I think this would be a really good idea for her, I can not put an order in for this but thought maybe you guys could help?      Thank you for your time, sorry for the long message!    RODOLFO Feng  Anticoagulation Clinic     Pt identified as fall risk. Precautions in place including yellow arm band, yellow socks, bed alarm, and be safe sign outside of room. Pt instructed to not get out of bed alone and to use call light for assistance. Pt voiced understanding.        Ranjith Martínez RN  05/15/21 9532

## 2023-11-11 ENCOUNTER — APPOINTMENT (OUTPATIENT)
Dept: CT IMAGING | Age: 23
End: 2023-11-11
Payer: COMMERCIAL

## 2023-11-11 ENCOUNTER — APPOINTMENT (OUTPATIENT)
Dept: GENERAL RADIOLOGY | Age: 23
End: 2023-11-11
Payer: COMMERCIAL

## 2023-11-11 ENCOUNTER — HOSPITAL ENCOUNTER (EMERGENCY)
Age: 23
Discharge: ANOTHER HOSPITAL | End: 2023-11-12
Attending: EMERGENCY MEDICINE
Payer: COMMERCIAL

## 2023-11-11 DIAGNOSIS — D64.9 ANEMIA REQUIRING TRANSFUSIONS: ICD-10-CM

## 2023-11-11 DIAGNOSIS — D57.01 SICKLE CELL DISEASE, WITH ACUTE CHEST SYNDROME (HCC): ICD-10-CM

## 2023-11-11 DIAGNOSIS — R50.9 FEVER, UNSPECIFIED FEVER CAUSE: Primary | ICD-10-CM

## 2023-11-11 LAB
ABO + RH BLD: NORMAL
ALBUMIN SERPL-MCNC: 4.2 G/DL (ref 3.4–5)
ALBUMIN/GLOB SERPL: 2.1 {RATIO} (ref 1.1–2.2)
ALP SERPL-CCNC: 63 U/L (ref 40–129)
ALT SERPL-CCNC: 11 U/L (ref 10–40)
ANION GAP SERPL CALCULATED.3IONS-SCNC: 10 MMOL/L (ref 3–16)
ANISOCYTOSIS BLD QL SMEAR: ABNORMAL
ANTI-XA UNFRAC HEPARIN: <0.1 IU/ML (ref 0.3–0.7)
AST SERPL-CCNC: 32 U/L (ref 15–37)
BACTERIA URNS QL MICRO: ABNORMAL /HPF
BASOPHILS # BLD: 0.3 K/UL (ref 0–0.2)
BASOPHILS NFR BLD: 1.9 %
BILIRUB SERPL-MCNC: 7.8 MG/DL (ref 0–1)
BILIRUB UR QL STRIP.AUTO: NEGATIVE
BLD GP AB SCN SERPL QL: NORMAL
BUN SERPL-MCNC: 8 MG/DL (ref 7–20)
CALCIUM SERPL-MCNC: 9.1 MG/DL (ref 8.3–10.6)
CHLORIDE SERPL-SCNC: 103 MMOL/L (ref 99–110)
CLARITY UR: CLEAR
CO2 SERPL-SCNC: 24 MMOL/L (ref 21–32)
COLOR UR: YELLOW
CREAT SERPL-MCNC: 0.6 MG/DL (ref 0.9–1.3)
DEPRECATED RDW RBC AUTO: 22.4 % (ref 12.4–15.4)
EOSINOPHIL # BLD: 0.2 K/UL (ref 0–0.6)
EOSINOPHIL NFR BLD: 1.3 %
EPI CELLS #/AREA URNS HPF: ABNORMAL /HPF (ref 0–5)
FLUAV RNA RESP QL NAA+PROBE: NOT DETECTED
FLUBV RNA RESP QL NAA+PROBE: NOT DETECTED
GFR SERPLBLD CREATININE-BSD FMLA CKD-EPI: >60 ML/MIN/{1.73_M2}
GLUCOSE SERPL-MCNC: 103 MG/DL (ref 70–99)
GLUCOSE UR STRIP.AUTO-MCNC: NEGATIVE MG/DL
HCT VFR BLD AUTO: 16.5 % (ref 40.5–52.5)
HGB BLD-MCNC: 6.1 G/DL (ref 13.5–17.5)
HGB UR QL STRIP.AUTO: ABNORMAL
HYPOCHROMIA BLD QL SMEAR: ABNORMAL
KETONES UR STRIP.AUTO-MCNC: NEGATIVE MG/DL
LACTATE BLDV-SCNC: 0.5 MMOL/L (ref 0.4–1.9)
LDH SERPL L TO P-CCNC: 574 U/L (ref 100–190)
LEUKOCYTE ESTERASE UR QL STRIP.AUTO: NEGATIVE
LYMPHOCYTES # BLD: 1.2 K/UL (ref 1–5.1)
LYMPHOCYTES NFR BLD: 8.6 %
MCH RBC QN AUTO: 34.6 PG (ref 26–34)
MCHC RBC AUTO-ENTMCNC: 36.8 G/DL (ref 31–36)
MCV RBC AUTO: 94.1 FL (ref 80–100)
MONOCYTES # BLD: 2 K/UL (ref 0–1.3)
MONOCYTES NFR BLD: 14.8 %
NEUTROPHILS # BLD: 9.9 K/UL (ref 1.7–7.7)
NEUTROPHILS NFR BLD: 73.4 %
NITRITE UR QL STRIP.AUTO: NEGATIVE
PH UR STRIP.AUTO: 7 [PH] (ref 5–8)
PLATELET # BLD AUTO: 333 K/UL (ref 135–450)
PLATELET BLD QL SMEAR: ADEQUATE
PMV BLD AUTO: 8.7 FL (ref 5–10.5)
POIKILOCYTOSIS BLD QL SMEAR: ABNORMAL
POTASSIUM SERPL-SCNC: 3.9 MMOL/L (ref 3.5–5.1)
PROCALCITONIN SERPL IA-MCNC: 0.21 NG/ML (ref 0–0.15)
PROT SERPL-MCNC: 6.2 G/DL (ref 6.4–8.2)
PROT UR STRIP.AUTO-MCNC: ABNORMAL MG/DL
RBC # BLD AUTO: 1.76 M/UL (ref 4.2–5.9)
RBC #/AREA URNS HPF: ABNORMAL /HPF (ref 0–4)
SARS-COV-2 RNA RESP QL NAA+PROBE: NOT DETECTED
SICKLE CELLS BLD QL SMEAR: ABNORMAL
SLIDE REVIEW: ABNORMAL
SODIUM SERPL-SCNC: 137 MMOL/L (ref 136–145)
SP GR UR STRIP.AUTO: 1.01 (ref 1–1.03)
TARGETS BLD QL SMEAR: ABNORMAL
TROPONIN, HIGH SENSITIVITY: 29 NG/L (ref 0–22)
TROPONIN, HIGH SENSITIVITY: 31 NG/L (ref 0–22)
UA COMPLETE W REFLEX CULTURE PNL UR: ABNORMAL
UA DIPSTICK W REFLEX MICRO PNL UR: YES
URN SPEC COLLECT METH UR: ABNORMAL
UROBILINOGEN UR STRIP-ACNC: 1 E.U./DL
WBC # BLD AUTO: 13.5 K/UL (ref 4–11)
WBC #/AREA URNS HPF: ABNORMAL /HPF (ref 0–5)

## 2023-11-11 PROCEDURE — 83605 ASSAY OF LACTIC ACID: CPT

## 2023-11-11 PROCEDURE — 99285 EMERGENCY DEPT VISIT HI MDM: CPT

## 2023-11-11 PROCEDURE — 96374 THER/PROPH/DIAG INJ IV PUSH: CPT

## 2023-11-11 PROCEDURE — 2580000003 HC RX 258: Performed by: EMERGENCY MEDICINE

## 2023-11-11 PROCEDURE — 87636 SARSCOV2 & INF A&B AMP PRB: CPT

## 2023-11-11 PROCEDURE — 85520 HEPARIN ASSAY: CPT

## 2023-11-11 PROCEDURE — 96368 THER/DIAG CONCURRENT INF: CPT

## 2023-11-11 PROCEDURE — 6370000000 HC RX 637 (ALT 250 FOR IP): Performed by: EMERGENCY MEDICINE

## 2023-11-11 PROCEDURE — 6360000004 HC RX CONTRAST MEDICATION: Performed by: EMERGENCY MEDICINE

## 2023-11-11 PROCEDURE — 86900 BLOOD TYPING SEROLOGIC ABO: CPT

## 2023-11-11 PROCEDURE — 71045 X-RAY EXAM CHEST 1 VIEW: CPT

## 2023-11-11 PROCEDURE — 86850 RBC ANTIBODY SCREEN: CPT

## 2023-11-11 PROCEDURE — 80053 COMPREHEN METABOLIC PANEL: CPT

## 2023-11-11 PROCEDURE — 81001 URINALYSIS AUTO W/SCOPE: CPT

## 2023-11-11 PROCEDURE — 71260 CT THORAX DX C+: CPT

## 2023-11-11 PROCEDURE — 85025 COMPLETE CBC W/AUTO DIFF WBC: CPT

## 2023-11-11 PROCEDURE — 96365 THER/PROPH/DIAG IV INF INIT: CPT

## 2023-11-11 PROCEDURE — 83615 LACTATE (LD) (LDH) ENZYME: CPT

## 2023-11-11 PROCEDURE — 96375 TX/PRO/DX INJ NEW DRUG ADDON: CPT

## 2023-11-11 PROCEDURE — 93005 ELECTROCARDIOGRAM TRACING: CPT | Performed by: EMERGENCY MEDICINE

## 2023-11-11 PROCEDURE — 86901 BLOOD TYPING SEROLOGIC RH(D): CPT

## 2023-11-11 PROCEDURE — 84484 ASSAY OF TROPONIN QUANT: CPT

## 2023-11-11 PROCEDURE — 87040 BLOOD CULTURE FOR BACTERIA: CPT

## 2023-11-11 PROCEDURE — 36415 COLL VENOUS BLD VENIPUNCTURE: CPT

## 2023-11-11 PROCEDURE — 96366 THER/PROPH/DIAG IV INF ADDON: CPT

## 2023-11-11 PROCEDURE — 84145 PROCALCITONIN (PCT): CPT

## 2023-11-11 PROCEDURE — 6360000002 HC RX W HCPCS: Performed by: EMERGENCY MEDICINE

## 2023-11-11 RX ORDER — SODIUM CHLORIDE 9 MG/ML
INJECTION, SOLUTION INTRAVENOUS PRN
Status: DISCONTINUED | OUTPATIENT
Start: 2023-11-11 | End: 2023-11-12 | Stop reason: HOSPADM

## 2023-11-11 RX ORDER — 0.9 % SODIUM CHLORIDE 0.9 %
1000 INTRAVENOUS SOLUTION INTRAVENOUS ONCE
Status: COMPLETED | OUTPATIENT
Start: 2023-11-11 | End: 2023-11-12

## 2023-11-11 RX ORDER — KETOROLAC TROMETHAMINE 15 MG/ML
15 INJECTION, SOLUTION INTRAMUSCULAR; INTRAVENOUS ONCE
Status: COMPLETED | OUTPATIENT
Start: 2023-11-11 | End: 2023-11-11

## 2023-11-11 RX ORDER — 0.9 % SODIUM CHLORIDE 0.9 %
1000 INTRAVENOUS SOLUTION INTRAVENOUS ONCE
Status: COMPLETED | OUTPATIENT
Start: 2023-11-11 | End: 2023-11-11

## 2023-11-11 RX ORDER — ACETAMINOPHEN 325 MG/1
650 TABLET ORAL ONCE
Status: COMPLETED | OUTPATIENT
Start: 2023-11-11 | End: 2023-11-11

## 2023-11-11 RX ADMIN — SODIUM CHLORIDE 1000 ML: 9 INJECTION, SOLUTION INTRAVENOUS at 23:19

## 2023-11-11 RX ADMIN — KETOROLAC TROMETHAMINE 15 MG: 15 INJECTION, SOLUTION INTRAMUSCULAR; INTRAVENOUS at 21:32

## 2023-11-11 RX ADMIN — AZITHROMYCIN MONOHYDRATE 500 MG: 500 INJECTION, POWDER, LYOPHILIZED, FOR SOLUTION INTRAVENOUS at 23:23

## 2023-11-11 RX ADMIN — SODIUM CHLORIDE 1000 ML: 9 INJECTION, SOLUTION INTRAVENOUS at 19:42

## 2023-11-11 RX ADMIN — HYDROMORPHONE HYDROCHLORIDE 0.5 MG: 1 INJECTION, SOLUTION INTRAMUSCULAR; INTRAVENOUS; SUBCUTANEOUS at 23:43

## 2023-11-11 RX ADMIN — CEFTRIAXONE SODIUM 1000 MG: 1 INJECTION, POWDER, FOR SOLUTION INTRAMUSCULAR; INTRAVENOUS at 22:11

## 2023-11-11 RX ADMIN — IOPAMIDOL 75 ML: 755 INJECTION, SOLUTION INTRAVENOUS at 21:05

## 2023-11-11 RX ADMIN — HYDROMORPHONE HYDROCHLORIDE 0.5 MG: 1 INJECTION, SOLUTION INTRAMUSCULAR; INTRAVENOUS; SUBCUTANEOUS at 20:06

## 2023-11-11 RX ADMIN — ACETAMINOPHEN 650 MG: 325 TABLET ORAL at 19:42

## 2023-11-11 ASSESSMENT — PAIN SCALES - GENERAL
PAINLEVEL_OUTOF10: 9

## 2023-11-11 ASSESSMENT — PAIN - FUNCTIONAL ASSESSMENT: PAIN_FUNCTIONAL_ASSESSMENT: 0-10

## 2023-11-11 ASSESSMENT — PAIN DESCRIPTION - LOCATION: LOCATION: BACK;CHEST

## 2023-11-12 VITALS
WEIGHT: 180 LBS | HEIGHT: 69 IN | SYSTOLIC BLOOD PRESSURE: 142 MMHG | BODY MASS INDEX: 26.66 KG/M2 | RESPIRATION RATE: 19 BRPM | OXYGEN SATURATION: 100 % | DIASTOLIC BLOOD PRESSURE: 50 MMHG | TEMPERATURE: 98.6 F | HEART RATE: 63 BPM

## 2023-11-12 LAB
EKG ATRIAL RATE: 97 BPM
EKG DIAGNOSIS: NORMAL
EKG P AXIS: 7 DEGREES
EKG P-R INTERVAL: 152 MS
EKG Q-T INTERVAL: 334 MS
EKG QRS DURATION: 92 MS
EKG QTC CALCULATION (BAZETT): 424 MS
EKG R AXIS: -6 DEGREES
EKG T AXIS: 105 DEGREES
EKG VENTRICULAR RATE: 97 BPM

## 2023-11-12 PROCEDURE — 93010 ELECTROCARDIOGRAM REPORT: CPT | Performed by: INTERNAL MEDICINE

## 2023-11-12 NOTE — ED NOTES
Hematocrit - 16.5   Hgb 6.1    MD made aware   Fouzia Dennis RN  11/11/23 2001       Fouzia Dennis RN  11/11/23 2002

## 2023-11-12 NOTE — ED NOTES
2053 - Call placed to [facility] for consult with Hematology      Courtney Xiao  11/11/23 2055 [facility] returned the page with [facility] on the line to connect Dr. Judith Hannon with Hematology     Courtney Xiao  11/11/23 2107 2124 - [facility] called with  Hospitalist Dr. Chirag Martinez on the line who is speaking with Dr. Judith Hannon at this time     Courtney Xiao  11/11/23 2125

## 2023-11-12 NOTE — ED PROVIDER NOTES
[address] [address] ED    EMERGENCY DEPARTMENT ENCOUNTER        Patient Name: Salima Torres  MRN: [id number]  [facility] 2000  Date of evaluation: 11/11/2023  PCP: No primary care provider on file. Note Started: 9:23 PM EST 11/11/23    CHIEF COMPLAINT       Sickle Cell Pain Crisis (Reports sickle cell crisis since Wednesday. Reports chest and back pain. Fever 102 per ems. )      HISTORY OF PRESENT ILLNESS: 1 or more Elements       Salima Torres is a 24 y.o. male with history of sickle cell who presents to the emergency department for evaluation of chest pain, cough, and fever. Reports he feels like he is in sickle cell pain crisis. Says he tried ibuprofen and Tylenol at home been trying oxycodone with no significant change in symptoms. Not sure when he started running a fever. Has been coughing over the past few days. Has also been having chest tightness. Denies any known sick contacts. Denies having abdominal pain, nausea, vomiting, or diarrhea. No other complaints, modifying factors or associated symptoms. History obtained by the patient unless stated otherwise as above on HPI. No limitations unless specified as above on HPI. Past medical history:   Past Medical History:   Diagnosis Date    Accidental overdose     Asthma     DVT (deep venous thrombosis) ([address]) 10/19/2021    R leg    Enlarged heart     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)     Sickle cell anemia (HCC)        Past surgical history:   Past Surgical History:   Procedure Laterality Date    ABDOMEN SURGERY N/A 5/5/2023    INCISION AND DRAINAGE OF PENIS AND SCROTUM performed by Sanchez Olivares MD at [facility] Left 2012    leg    PENILE PROSTHESIS PLACEMENT N/A 4/20/2023    TAKE DOWN OF ISCHEMIC PRIAPISM performed by Chitra Alejandre MD at [address] [facility] medications:   Prior to Admission medications    Medication Sig Start Date End Date Taking?  Authorizing Provider   apixaban

## 2023-11-15 LAB
BACTERIA BLD CULT ORG #2: NORMAL
BACTERIA BLD CULT: NORMAL

## 2024-04-15 ENCOUNTER — HOSPITAL ENCOUNTER (INPATIENT)
Age: 24
LOS: 8 days | Discharge: HOME | DRG: 812 | End: 2024-04-24
Attending: EMERGENCY MEDICINE | Admitting: INTERNAL MEDICINE
Payer: COMMERCIAL

## 2024-04-15 ENCOUNTER — CLINICAL DOCUMENTATION (OUTPATIENT)
Dept: CASE MANAGEMENT | Age: 24
End: 2024-04-15

## 2024-04-15 DIAGNOSIS — M54.9 UPPER BACK PAIN ON LEFT SIDE: ICD-10-CM

## 2024-04-15 DIAGNOSIS — D57.1 SICKLE CELL DISEASE WITHOUT CRISIS (HCC): ICD-10-CM

## 2024-04-15 DIAGNOSIS — R07.9 LEFT-SIDED CHEST PAIN: ICD-10-CM

## 2024-04-15 DIAGNOSIS — M54.50 ACUTE MIDLINE LOW BACK PAIN WITHOUT SCIATICA: ICD-10-CM

## 2024-04-15 DIAGNOSIS — R93.89 ABNORMAL CT OF THE CHEST: ICD-10-CM
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
## 2024-04-15 PROCEDURE — 96375 TX/PRO/DX INJ NEW DRUG ADDON: CPT

## 2024-04-15 PROCEDURE — 96376 TX/PRO/DX INJ SAME DRUG ADON: CPT

## 2024-04-15 PROCEDURE — 96374 THER/PROPH/DIAG INJ IV PUSH: CPT

## 2024-04-15 PROCEDURE — 99285 EMERGENCY DEPT VISIT HI MDM: CPT

## 2024-04-15 PROCEDURE — 93005 ELECTROCARDIOGRAM TRACING: CPT | Performed by: EMERGENCY MEDICINE

## 2024-04-15 ASSESSMENT — PAIN SCALES - GENERAL: PAINLEVEL_OUTOF10: 9

## 2024-04-15 ASSESSMENT — PAIN DESCRIPTION - LOCATION: LOCATION: BACK;CHEST

## 2024-04-15 ASSESSMENT — PAIN - FUNCTIONAL ASSESSMENT: PAIN_FUNCTIONAL_ASSESSMENT: 0-10

## 2024-04-15 NOTE — MANAGEMENT PLAN
Patient Management Plan              Pt. Name: Javier Javed  : 2000        Date plan entered: 22        Patients Physicians:     Primary Care  : Danial Xiao MD  Contact #:  [phone]  Specialists:    Contact #:                                                   Summary        Reason for Referral: This patient has been provided a management plan for Chronic Pain, Medical Needs and Social Needs                 Patient has had frequent visits for:     Accidental overdose   Asthma   DVT (deep venous thrombosis) (HCC)   R leg   Enlarged heart   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)   Sickle cell anemia (HCC)      Please note has been d/c'd from [facility] to see  [facility] 23         Warnings/Safety Alerts:           Goals/Interventions:   Check for 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 Power in Urology for follow up  Limit narcotics, 1 dose of Toradol 50 mg IV, Tylenol for chronic pain issues  Chest pain - troponin, possible CT pulmonary  Refer to [facility], [phone],  Kelli Bowens  CBC, CMP, sed rate, reticulocyte  XR as needed   No prescriptions for narcotics  Contact [facility] for pain management plan while inpatient, has been d/c'd from [facility] due to noncompliance.  Supposed to see  [facility] 23. [phone]            During Business Hours:      After Hours:               Situation: Chronic Conditions Summary:      Accidental overdose   Asthma   DVT (deep venous thrombosis) (HCC)   R leg   Enlarged heart   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)   Sickle cell anemia (HCC)             Challenges for Self Management: Utilization: ED/Hospital admissions and Behavioral Health Factors (excessive alcohol, recreational drug use, high risk behaviors)             Medication Management: Poor Adherence and Poly-pharm               Advanced Care Plan: None                  Management Plan entered by: BC Bran              *

## 2024-04-16 ENCOUNTER — APPOINTMENT (OUTPATIENT)
Dept: CT IMAGING | Age: 24
DRG: 812 | End: 2024-04-16
Payer: COMMERCIAL

## 2024-04-16 ENCOUNTER — APPOINTMENT (OUTPATIENT)
Dept: MRI IMAGING | Age: 24
DRG: 812 | End: 2024-04-16
Payer: COMMERCIAL

## 2024-04-16 LAB
ANION GAP SERPL CALCULATED.3IONS-SCNC: 11 MMOL/L (ref 3–16)
BASOPHILS # BLD: 0.1 K/UL (ref 0–0.2)
BASOPHILS NFR BLD: 0.5 %
BUN SERPL-MCNC: 9 MG/DL (ref 7–20)
CALCIUM SERPL-MCNC: 9 MG/DL (ref 8.3–10.6)
CHLORIDE SERPL-SCNC: 100 MMOL/L (ref 99–110)
CO2 SERPL-SCNC: 24 MMOL/L (ref 21–32)
CREAT SERPL-MCNC: 0.7 MG/DL (ref 0.9–1.3)
DEPRECATED RDW RBC AUTO: 22.2 % (ref 12.4–15.4)
EKG ATRIAL RATE: 71 BPM
EKG DIAGNOSIS: NORMAL
EKG P AXIS: 34 DEGREES
EKG P-R INTERVAL: 166 MS
EKG Q-T INTERVAL: 386 MS
EKG QRS DURATION: 106 MS
EKG QTC CALCULATION (BAZETT): 419 MS
EKG R AXIS: 66 DEGREES
EKG T AXIS: 18 DEGREES
EKG VENTRICULAR RATE: 71 BPM
EOSINOPHIL # BLD: 0.2 K/UL (ref 0–0.6)
EOSINOPHIL NFR BLD: 1.1 %
GFR SERPLBLD CREATININE-BSD FMLA CKD-EPI: >90 ML/MIN/{1.73_M2}
GLUCOSE SERPL-MCNC: 122 MG/DL (ref 70–99)
HCT VFR BLD AUTO: 21.6 % (ref 40.5–52.5)
HGB BLD-MCNC: 7.7 G/DL (ref 13.5–17.5)
IMMATURE RETIC FRACT: 0.65 (ref 0.21–0.37)
INR PPP: 1.61 (ref 0.85–1.15)
LYMPHOCYTES # BLD: 4.9 K/UL (ref 1–5.1)
LYMPHOCYTES NFR BLD: 28.6 %
MCH RBC QN AUTO: 34.8 PG (ref 26–34)
MCHC RBC AUTO-ENTMCNC: 35.7 G/DL (ref 31–36)
MCV RBC AUTO: 97.4 FL (ref 80–100)
MONOCYTES # BLD: 1.7 K/UL (ref 0–1.3)
MONOCYTES NFR BLD: 9.6 %
NEUTROPHILS # BLD: 10.4 K/UL (ref 1.7–7.7)
NEUTROPHILS NFR BLD: 60.2 %
PLATELET # BLD AUTO: 329 K/UL (ref 135–450)
PMV BLD AUTO: 8.2 FL (ref 5–10.5)
POTASSIUM SERPL-SCNC: 3.5 MMOL/L (ref 3.5–5.1)
PROTHROMBIN TIME: 19.3 SEC (ref 11.9–14.9)
RBC # BLD AUTO: 2.22 M/UL (ref 4.2–5.9)
RETICS # AUTO: 0.18 M/UL
RETICS/RBC NFR AUTO: 8.02 % (ref 0.5–2.18)
SODIUM SERPL-SCNC: 135 MMOL/L (ref 136–145)
TROPONIN, HIGH SENSITIVITY: 15 NG/L (ref 0–22)
WBC # BLD AUTO: 17.2 K/UL (ref 4–11)

## 2024-04-16 PROCEDURE — 6360000004 HC RX CONTRAST MEDICATION: Performed by: EMERGENCY MEDICINE

## 2024-04-16 PROCEDURE — 71275 CT ANGIOGRAPHY CHEST: CPT

## 2024-04-16 PROCEDURE — 6370000000 HC RX 637 (ALT 250 FOR IP): Performed by: INTERNAL MEDICINE

## 2024-04-16 PROCEDURE — 6360000002 HC RX W HCPCS: Performed by: INTERNAL MEDICINE

## 2024-04-16 PROCEDURE — 80048 BASIC METABOLIC PNL TOTAL CA: CPT

## 2024-04-16 PROCEDURE — 85025 COMPLETE CBC W/AUTO DIFF WBC: CPT

## 2024-04-16 PROCEDURE — 85610 PROTHROMBIN TIME: CPT

## 2024-04-16 PROCEDURE — 2580000003 HC RX 258: Performed by: EMERGENCY MEDICINE

## 2024-04-16 PROCEDURE — A9577 INJ MULTIHANCE: HCPCS | Performed by: EMERGENCY MEDICINE

## 2024-04-16 PROCEDURE — 1200000000 HC SEMI PRIVATE

## 2024-04-16 PROCEDURE — 71260 CT THORAX DX C+: CPT

## 2024-04-16 PROCEDURE — 6370000000 HC RX 637 (ALT 250 FOR IP): Performed by: NURSE PRACTITIONER

## 2024-04-16 PROCEDURE — 6360000002 HC RX W HCPCS: Performed by: EMERGENCY MEDICINE

## 2024-04-16 PROCEDURE — 71552 MRI CHEST W/O & W/DYE: CPT

## 2024-04-16 PROCEDURE — 72131 CT LUMBAR SPINE W/O DYE: CPT

## 2024-04-16 PROCEDURE — 84484 ASSAY OF TROPONIN QUANT: CPT

## 2024-04-16 PROCEDURE — 2580000003 HC RX 258: Performed by: INTERNAL MEDICINE

## 2024-04-16 PROCEDURE — 85045 AUTOMATED RETICULOCYTE COUNT: CPT

## 2024-04-16 PROCEDURE — 93010 ELECTROCARDIOGRAM REPORT: CPT | Performed by: INTERNAL MEDICINE

## 2024-04-16 PROCEDURE — 6370000000 HC RX 637 (ALT 250 FOR IP): Performed by: EMERGENCY MEDICINE

## 2024-04-16 RX ORDER — SODIUM CHLORIDE 450 MG/100ML
INJECTION, SOLUTION INTRAVENOUS CONTINUOUS
Status: DISCONTINUED | OUTPATIENT
Start: 2024-04-16 | End: 2024-04-24 | Stop reason: HOSPADM

## 2024-04-16 RX ORDER — KETOROLAC TROMETHAMINE 15 MG/ML
15 INJECTION, SOLUTION INTRAMUSCULAR; INTRAVENOUS ONCE
Status: COMPLETED | OUTPATIENT
Start: 2024-04-16 | End: 2024-04-16

## 2024-04-16 RX ORDER — POLYETHYLENE GLYCOL 3350 17 G/17G
17 POWDER, FOR SOLUTION ORAL DAILY PRN
Status: DISCONTINUED | OUTPATIENT
Start: 2024-04-16 | End: 2024-04-24 | Stop reason: HOSPADM

## 2024-04-16 RX ORDER — HYDROXYUREA 500 MG/1
1000 CAPSULE ORAL 2 TIMES DAILY
Status: DISCONTINUED | OUTPATIENT
Start: 2024-04-16 | End: 2024-04-24 | Stop reason: HOSPADM

## 2024-04-16 RX ORDER — KETOROLAC TROMETHAMINE 30 MG/ML
30 INJECTION, SOLUTION INTRAMUSCULAR; INTRAVENOUS EVERY 6 HOURS
Status: COMPLETED | OUTPATIENT
Start: 2024-04-16 | End: 2024-04-17

## 2024-04-16 RX ORDER — ACETAMINOPHEN 325 MG/1
650 TABLET ORAL EVERY 6 HOURS PRN
Status: DISCONTINUED | OUTPATIENT
Start: 2024-04-16 | End: 2024-04-24 | Stop reason: HOSPADM

## 2024-04-16 RX ORDER — SODIUM CHLORIDE 0.9 % (FLUSH) 0.9 %
5-40 SYRINGE (ML) INJECTION PRN
Status: DISCONTINUED | OUTPATIENT
Start: 2024-04-16 | End: 2024-04-24 | Stop reason: HOSPADM

## 2024-04-16 RX ORDER — 0.9 % SODIUM CHLORIDE 0.9 %
1000 INTRAVENOUS SOLUTION INTRAVENOUS ONCE
Status: COMPLETED | OUTPATIENT
Start: 2024-04-16 | End: 2024-04-16

## 2024-04-16 RX ORDER — PANTOPRAZOLE SODIUM 40 MG/1
40 TABLET, DELAYED RELEASE ORAL
Status: DISCONTINUED | OUTPATIENT
Start: 2024-04-17 | End: 2024-04-24 | Stop reason: HOSPADM

## 2024-04-16 RX ORDER — NALOXONE HYDROCHLORIDE 0.4 MG/ML
INJECTION, SOLUTION INTRAMUSCULAR; INTRAVENOUS; SUBCUTANEOUS PRN
Status: DISCONTINUED | OUTPATIENT
Start: 2024-04-16 | End: 2024-04-24 | Stop reason: HOSPADM

## 2024-04-16 RX ORDER — 0.9 % SODIUM CHLORIDE 0.9 %
500 INTRAVENOUS SOLUTION INTRAVENOUS ONCE
Status: COMPLETED | OUTPATIENT
Start: 2024-04-16 | End: 2024-04-16

## 2024-04-16 RX ORDER — POTASSIUM CHLORIDE 20 MEQ/1
40 TABLET, EXTENDED RELEASE ORAL PRN
Status: DISCONTINUED | OUTPATIENT
Start: 2024-04-16 | End: 2024-04-24 | Stop reason: HOSPADM

## 2024-04-16 RX ORDER — ONDANSETRON 4 MG/1
4 TABLET, ORALLY DISINTEGRATING ORAL EVERY 8 HOURS PRN
Status: DISCONTINUED | OUTPATIENT
Start: 2024-04-16 | End: 2024-04-24 | Stop reason: HOSPADM

## 2024-04-16 RX ORDER — LIDOCAINE 4 G/G
1 PATCH TOPICAL ONCE
Status: COMPLETED | OUTPATIENT
Start: 2024-04-16 | End: 2024-04-16

## 2024-04-16 RX ORDER — SODIUM CHLORIDE 9 MG/ML
INJECTION, SOLUTION INTRAVENOUS PRN
Status: DISCONTINUED | OUTPATIENT
Start: 2024-04-16 | End: 2024-04-24 | Stop reason: HOSPADM

## 2024-04-16 RX ORDER — OXYCODONE HYDROCHLORIDE 5 MG/1
10 TABLET ORAL ONCE AS NEEDED
Status: COMPLETED | OUTPATIENT
Start: 2024-04-16 | End: 2024-04-16

## 2024-04-16 RX ORDER — ACETAMINOPHEN 650 MG/1
650 SUPPOSITORY RECTAL EVERY 6 HOURS PRN
Status: DISCONTINUED | OUTPATIENT
Start: 2024-04-16 | End: 2024-04-24 | Stop reason: HOSPADM

## 2024-04-16 RX ORDER — NALOXONE HYDROCHLORIDE 4 MG/.1ML
1 SPRAY NASAL PRN
COMMUNITY

## 2024-04-16 RX ORDER — ONDANSETRON 2 MG/ML
4 INJECTION INTRAMUSCULAR; INTRAVENOUS EVERY 6 HOURS PRN
Status: DISCONTINUED | OUTPATIENT
Start: 2024-04-16 | End: 2024-04-24 | Stop reason: HOSPADM

## 2024-04-16 RX ORDER — SODIUM CHLORIDE 0.9 % (FLUSH) 0.9 %
5-40 SYRINGE (ML) INJECTION EVERY 12 HOURS SCHEDULED
Status: DISCONTINUED | OUTPATIENT
Start: 2024-04-16 | End: 2024-04-24 | Stop reason: HOSPADM

## 2024-04-16 RX ORDER — OXYCODONE HYDROCHLORIDE 10 MG/1
10 TABLET ORAL EVERY 6 HOURS PRN
COMMUNITY

## 2024-04-16 RX ORDER — HYDROMORPHONE HYDROCHLORIDE 2 MG/ML
2 INJECTION, SOLUTION INTRAMUSCULAR; INTRAVENOUS; SUBCUTANEOUS ONCE
Status: COMPLETED | OUTPATIENT
Start: 2024-04-16 | End: 2024-04-16

## 2024-04-16 RX ORDER — ALBUTEROL SULFATE 90 UG/1
2 AEROSOL, METERED RESPIRATORY (INHALATION) EVERY 6 HOURS PRN
Status: DISCONTINUED | OUTPATIENT
Start: 2024-04-16 | End: 2024-04-24 | Stop reason: HOSPADM

## 2024-04-16 RX ORDER — ACETAMINOPHEN 500 MG
1000 TABLET ORAL ONCE
Status: COMPLETED | OUTPATIENT
Start: 2024-04-16 | End: 2024-04-16

## 2024-04-16 RX ORDER — POTASSIUM CHLORIDE 7.45 MG/ML
10 INJECTION INTRAVENOUS PRN
Status: DISCONTINUED | OUTPATIENT
Start: 2024-04-16 | End: 2024-04-24 | Stop reason: HOSPADM

## 2024-04-16 RX ORDER — MAGNESIUM SULFATE IN WATER 40 MG/ML
2000 INJECTION, SOLUTION INTRAVENOUS PRN
Status: DISCONTINUED | OUTPATIENT
Start: 2024-04-16 | End: 2024-04-24 | Stop reason: HOSPADM

## 2024-04-16 RX ORDER — LIDOCAINE 4 G/G
2 PATCH TOPICAL ONCE
Status: DISCONTINUED | OUTPATIENT
Start: 2024-04-16 | End: 2024-04-16

## 2024-04-16 RX ADMIN — Medication: at 17:34

## 2024-04-16 RX ADMIN — SODIUM CHLORIDE: 4.5 INJECTION, SOLUTION INTRAVENOUS at 17:09

## 2024-04-16 RX ADMIN — IOPAMIDOL 75 ML: 755 INJECTION, SOLUTION INTRAVENOUS at 13:28

## 2024-04-16 RX ADMIN — HYDROXYUREA 1000 MG: 500 CAPSULE ORAL at 20:37

## 2024-04-16 RX ADMIN — ACETAMINOPHEN 1000 MG: 500 TABLET ORAL at 00:47

## 2024-04-16 RX ADMIN — HYDROMORPHONE HYDROCHLORIDE 2 MG: 2 INJECTION, SOLUTION INTRAMUSCULAR; INTRAVENOUS; SUBCUTANEOUS at 02:20

## 2024-04-16 RX ADMIN — HYDROMORPHONE HYDROCHLORIDE 2 MG: 2 INJECTION, SOLUTION INTRAMUSCULAR; INTRAVENOUS; SUBCUTANEOUS at 12:31

## 2024-04-16 RX ADMIN — KETOROLAC TROMETHAMINE 15 MG: 15 INJECTION, SOLUTION INTRAMUSCULAR; INTRAVENOUS at 04:15

## 2024-04-16 RX ADMIN — GADOBENATE DIMEGLUMINE 15 ML: 529 INJECTION, SOLUTION INTRAVENOUS at 07:33

## 2024-04-16 RX ADMIN — SODIUM CHLORIDE 1000 ML: 9 INJECTION, SOLUTION INTRAVENOUS at 13:39

## 2024-04-16 RX ADMIN — OXYCODONE HYDROCHLORIDE 10 MG: 5 TABLET ORAL at 23:56

## 2024-04-16 RX ADMIN — KETOROLAC TROMETHAMINE 30 MG: 30 INJECTION, SOLUTION INTRAMUSCULAR; INTRAVENOUS at 18:57

## 2024-04-16 RX ADMIN — SODIUM CHLORIDE 500 ML: 9 INJECTION, SOLUTION INTRAVENOUS at 09:38

## 2024-04-16 RX ADMIN — SODIUM CHLORIDE 1000 ML: 9 INJECTION, SOLUTION INTRAVENOUS at 06:32

## 2024-04-16 RX ADMIN — KETOROLAC TROMETHAMINE 30 MG: 30 INJECTION, SOLUTION INTRAMUSCULAR; INTRAVENOUS at 23:56

## 2024-04-16 RX ADMIN — IOPAMIDOL 75 ML: 755 INJECTION, SOLUTION INTRAVENOUS at 02:12

## 2024-04-16 RX ADMIN — Medication 10 ML: at 20:32

## 2024-04-16 RX ADMIN — HYDROMORPHONE HYDROCHLORIDE 2 MG: 2 INJECTION, SOLUTION INTRAMUSCULAR; INTRAVENOUS; SUBCUTANEOUS at 04:15

## 2024-04-16 RX ADMIN — ACETAMINOPHEN 650 MG: 325 TABLET ORAL at 22:17

## 2024-04-16 RX ADMIN — APIXABAN 5 MG: 5 TABLET, FILM COATED ORAL at 20:31

## 2024-04-16 ASSESSMENT — PAIN DESCRIPTION - PAIN TYPE: TYPE: ACUTE PAIN

## 2024-04-16 ASSESSMENT — PAIN SCALES - GENERAL
PAINLEVEL_OUTOF10: 9
PAINLEVEL_OUTOF10: 7
PAINLEVEL_OUTOF10: 3
PAINLEVEL_OUTOF10: 9
PAINLEVEL_OUTOF10: 8
PAINLEVEL_OUTOF10: 9
PAINLEVEL_OUTOF10: 10
PAINLEVEL_OUTOF10: 0
PAINLEVEL_OUTOF10: 8
PAINLEVEL_OUTOF10: 0
PAINLEVEL_OUTOF10: 8

## 2024-04-16 ASSESSMENT — PAIN DESCRIPTION - DESCRIPTORS
DESCRIPTORS: ACHING
DESCRIPTORS: ACHING

## 2024-04-16 ASSESSMENT — PAIN DESCRIPTION - ONSET: ONSET: ON-GOING

## 2024-04-16 ASSESSMENT — PAIN DESCRIPTION - LOCATION
LOCATION: BACK;CHEST
LOCATION: GENERALIZED
LOCATION: CHEST
LOCATION: GENERALIZED

## 2024-04-16 ASSESSMENT — PAIN DESCRIPTION - FREQUENCY: FREQUENCY: CONTINUOUS

## 2024-04-16 NOTE — ED PROVIDER NOTES
Emergency Department Encounter  Location: [facility] EMERGENCY DEPARTMENT    Patient: Javier Javed  MRN: [id number]  : 2000  Date of evaluation: 4/15/2024  ED Provider: Marita Martinez DO    8:30a.m.  Javier Javed was checked out to me by Dr. Tyson. Please see his/her initial documentation for details of the patient's initial ED presentation, physical exam and completed studies.    In brief, Javier Javed is a 23 y.o. male that presented to the emergency department for chest pain secondary to sickle cell crisis.  Reports that he takes opioid medication at home when his pain gets bad, however they did not improve his pain at all.    I have reviewed and interpreted all of the currently available lab results and diagnostics from this visit:  Results for orders placed or performed during the hospital encounter of 04/15/24   CBC with Auto Differential   Result Value Ref Range    WBC 17.2 (H) 4.0 - 11.0 K/uL    RBC 2.22 (L) 4.20 - 5.90 M/uL    Hemoglobin 7.7 (L) 13.5 - 17.5 g/dL    Hematocrit 21.6 (L) 40.5 - 52.5 %    MCV 97.4 80.0 - 100.0 fL    MCH 34.8 (H) 26.0 - 34.0 pg    MCHC 35.7 31.0 - 36.0 g/dL    RDW 22.2 (H) 12.4 - 15.4 %    Platelets 329 135 - 450 K/uL    MPV 8.2 5.0 - 10.5 fL    Neutrophils % 60.2 %    Lymphocytes % 28.6 %    Monocytes % 9.6 %    Eosinophils % 1.1 %    Basophils % 0.5 %    Neutrophils Absolute 10.4 (H) 1.7 - 7.7 K/uL    Lymphocytes Absolute 4.9 1.0 - 5.1 K/uL    Monocytes Absolute 1.7 (H) 0.0 - 1.3 K/uL    Eosinophils Absolute 0.2 0.0 - 0.6 K/uL    Basophils Absolute 0.1 0.0 - 0.2 K/uL   Basic Metabolic Panel   Result Value Ref Range    Sodium 135 (L) 136 - 145 mmol/L    Potassium 3.5 3.5 - 5.1 mmol/L    Chloride 100 99 - 110 mmol/L    CO2 24 21 - 32 mmol/L    Anion Gap 11 3 - 16    Glucose 122 (H) 70 - 99 mg/dL    BUN 9 7 - 20 mg/dL    Creatinine 0.7 (L) 0.9 - 1.3 mg/dL    Est, Glom Filt Rate >90 >60    Calcium 9.0 8.3 - 10.6 mg/dL   Troponin   Result Value Ref 
ms    QTc Calculation (Bazett) 419 ms    P Axis 34 degrees    R Axis 66 degrees    T Axis 18 degrees    Diagnosis Normal sinus rhythmNormal ECG      CT CHEST W CONTRAST   Final Result   There is low-density Elissa aortic fluid adjacent to the ascending aorta may   represent a pericardiac recess. MRI study would be recommended to exclude   aortic injury.         CT LUMBAR SPINE WO CONTRAST   Final Result   1. No acute osseous abnormality of the lumbar spine.   2. Diffuse heterogeneous osseous sclerosis in the lumbar spine possibly due   to renal osteodystrophy.         MRI CHEST W WO CONTRAST    (Results Pending)     _____________________________________________________________________    Point of Care Ultrasound@ 09:12: Cardiac    Indication: hypotension    Procedure: The pericardial space was imaged to evaluate for the presence of pericardial free fluid and/or for brief, limited evaluation of cardiac function. The limited examination included evaluation using the sibxiphoid windows.    Technical Limitations: none    Findings:   No pericardial free fluid appreciated  LV systolic function is not severely reduced  RV systolic function is not severely reduced  Grossly normal LV and RV size ratio    Impression: Negative exam for pericardial effusion      _____________________________________________________________________    MEDICAL DECISION MAKING & PLANS:    I reviewed the patient's recent and/or pertinent records, current medications, triage notes, allergies, and vital signs.    Most recent VS:  BP: 111/64,Temp: 98.1 °F (36.7 °C), Pulse: 68, Respirations: 17, SpO2: 100 %     Treatments:  Medications   lidocaine 4 % external patch 1 patch (1 patch TransDERmal Patch Applied 4/16/24 0045)   acetaminophen (TYLENOL) tablet 1,000 mg (1,000 mg Oral Given 4/16/24 0047)   HYDROmorphone (DILAUDID) injection 2 mg (2 mg IntraVENous Given 4/16/24 0220)   iopamidol (ISOVUE-370) 76 % injection 75 mL (75 mLs IntraVENous Given 4/16/24
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
## 2024-04-17 LAB
ANION GAP SERPL CALCULATED.3IONS-SCNC: 10 MMOL/L (ref 3–16)
BASOPHILS # BLD: 0.1 K/UL (ref 0–0.2)
BASOPHILS NFR BLD: 0.9 %
BUN SERPL-MCNC: 8 MG/DL (ref 7–20)
CALCIUM SERPL-MCNC: 8.8 MG/DL (ref 8.3–10.6)
CHLORIDE SERPL-SCNC: 109 MMOL/L (ref 99–110)
CO2 SERPL-SCNC: 22 MMOL/L (ref 21–32)
CREAT SERPL-MCNC: 0.6 MG/DL (ref 0.9–1.3)
DEPRECATED RDW RBC AUTO: 22.2 % (ref 12.4–15.4)
EOSINOPHIL # BLD: 0.3 K/UL (ref 0–0.6)
EOSINOPHIL NFR BLD: 2.2 %
GFR SERPLBLD CREATININE-BSD FMLA CKD-EPI: >90 ML/MIN/{1.73_M2}
GLUCOSE SERPL-MCNC: 91 MG/DL (ref 70–99)
HCT VFR BLD AUTO: 22 % (ref 40.5–52.5)
HGB BLD-MCNC: 7.7 G/DL (ref 13.5–17.5)
LYMPHOCYTES # BLD: 4.8 K/UL (ref 1–5.1)
LYMPHOCYTES NFR BLD: 31.5 %
MCH RBC QN AUTO: 33.3 PG (ref 26–34)
MCHC RBC AUTO-ENTMCNC: 35 G/DL (ref 31–36)
MCV RBC AUTO: 95.3 FL (ref 80–100)
MONOCYTES # BLD: 1.3 K/UL (ref 0–1.3)
MONOCYTES NFR BLD: 8.6 %
NEUTROPHILS # BLD: 8.6 K/UL (ref 1.7–7.7)
NEUTROPHILS NFR BLD: 56.8 %
PLATELET # BLD AUTO: 351 K/UL (ref 135–450)
PMV BLD AUTO: 8.3 FL (ref 5–10.5)
POTASSIUM SERPL-SCNC: 4.1 MMOL/L (ref 3.5–5.1)
RBC # BLD AUTO: 2.31 M/UL (ref 4.2–5.9)
SODIUM SERPL-SCNC: 141 MMOL/L (ref 136–145)
WBC # BLD AUTO: 15.1 K/UL (ref 4–11)

## 2024-04-17 PROCEDURE — 85025 COMPLETE CBC W/AUTO DIFF WBC: CPT

## 2024-04-17 PROCEDURE — 94761 N-INVAS EAR/PLS OXIMETRY MLT: CPT

## 2024-04-17 PROCEDURE — 2700000000 HC OXYGEN THERAPY PER DAY

## 2024-04-17 PROCEDURE — 6360000002 HC RX W HCPCS: Performed by: INTERNAL MEDICINE

## 2024-04-17 PROCEDURE — 2580000003 HC RX 258: Performed by: INTERNAL MEDICINE

## 2024-04-17 PROCEDURE — 80048 BASIC METABOLIC PNL TOTAL CA: CPT

## 2024-04-17 PROCEDURE — 36415 COLL VENOUS BLD VENIPUNCTURE: CPT

## 2024-04-17 PROCEDURE — 6370000000 HC RX 637 (ALT 250 FOR IP): Performed by: INTERNAL MEDICINE

## 2024-04-17 PROCEDURE — 1200000000 HC SEMI PRIVATE

## 2024-04-17 RX ADMIN — SODIUM CHLORIDE: 4.5 INJECTION, SOLUTION INTRAVENOUS at 09:59

## 2024-04-17 RX ADMIN — HYDROXYUREA 1000 MG: 500 CAPSULE ORAL at 09:53

## 2024-04-17 RX ADMIN — SODIUM CHLORIDE: 4.5 INJECTION, SOLUTION INTRAVENOUS at 18:14

## 2024-04-17 RX ADMIN — APIXABAN 5 MG: 5 TABLET, FILM COATED ORAL at 09:53

## 2024-04-17 RX ADMIN — KETOROLAC TROMETHAMINE 30 MG: 30 INJECTION, SOLUTION INTRAMUSCULAR; INTRAVENOUS at 18:19

## 2024-04-17 RX ADMIN — SODIUM CHLORIDE: 4.5 INJECTION, SOLUTION INTRAVENOUS at 00:02

## 2024-04-17 RX ADMIN — Medication 10 ML: at 20:32

## 2024-04-17 RX ADMIN — KETOROLAC TROMETHAMINE 30 MG: 30 INJECTION, SOLUTION INTRAMUSCULAR; INTRAVENOUS at 11:41

## 2024-04-17 RX ADMIN — HYDROXYUREA 1000 MG: 500 CAPSULE ORAL at 20:32

## 2024-04-17 RX ADMIN — APIXABAN 5 MG: 5 TABLET, FILM COATED ORAL at 20:32

## 2024-04-17 RX ADMIN — KETOROLAC TROMETHAMINE 30 MG: 30 INJECTION, SOLUTION INTRAMUSCULAR; INTRAVENOUS at 05:45

## 2024-04-17 RX ADMIN — PANTOPRAZOLE SODIUM 40 MG: 40 TABLET, DELAYED RELEASE ORAL at 05:45

## 2024-04-17 ASSESSMENT — PAIN DESCRIPTION - PAIN TYPE
TYPE: ACUTE PAIN

## 2024-04-17 ASSESSMENT — PAIN DESCRIPTION - ONSET
ONSET: ON-GOING

## 2024-04-17 ASSESSMENT — PAIN SCALES - GENERAL
PAINLEVEL_OUTOF10: 8
PAINLEVEL_OUTOF10: 8
PAINLEVEL_OUTOF10: 0
PAINLEVEL_OUTOF10: 8
PAINLEVEL_OUTOF10: 7

## 2024-04-17 ASSESSMENT — PAIN DESCRIPTION - LOCATION: LOCATION: CHEST;BACK

## 2024-04-17 ASSESSMENT — PAIN DESCRIPTION - DESCRIPTORS
DESCRIPTORS: ACHING

## 2024-04-17 ASSESSMENT — PAIN DESCRIPTION - FREQUENCY
FREQUENCY: CONTINUOUS

## 2024-04-17 NOTE — CARE COORDINATION
Discharge Planning:     (CM) reviewed the patient's chart to assess needs. Patient's Readmission Risk Score is 17%. Patient's medical insurance is [employer]. Patient's PCP is None Listed .    Needs PCP list.     No needs anticipated, at this time. CM team to follow. Staff to inform CM if additional discharge needs arise.     Electronically signed by Rosalie Peralta on 4/17/24 at 9:13 AM EDT
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
## 2024-04-18 ENCOUNTER — APPOINTMENT (OUTPATIENT)
Dept: GENERAL RADIOLOGY | Age: 24
DRG: 812 | End: 2024-04-18
Payer: COMMERCIAL

## 2024-04-18 LAB
ANION GAP SERPL CALCULATED.3IONS-SCNC: 9 MMOL/L (ref 3–16)
BASOPHILS # BLD: 0.1 K/UL (ref 0–0.2)
BASOPHILS NFR BLD: 0.8 %
BUN SERPL-MCNC: 8 MG/DL (ref 7–20)
CALCIUM SERPL-MCNC: 9.1 MG/DL (ref 8.3–10.6)
CHLORIDE SERPL-SCNC: 109 MMOL/L (ref 99–110)
CO2 SERPL-SCNC: 22 MMOL/L (ref 21–32)
CREAT SERPL-MCNC: 0.5 MG/DL (ref 0.9–1.3)
DEPRECATED RDW RBC AUTO: 23.8 % (ref 12.4–15.4)
EOSINOPHIL # BLD: 0.3 K/UL (ref 0–0.6)
EOSINOPHIL NFR BLD: 1.9 %
GFR SERPLBLD CREATININE-BSD FMLA CKD-EPI: >90 ML/MIN/{1.73_M2}
GLUCOSE SERPL-MCNC: 91 MG/DL (ref 70–99)
HCT VFR BLD AUTO: 21.6 % (ref 40.5–52.5)
HGB BLD-MCNC: 7.9 G/DL (ref 13.5–17.5)
LYMPHOCYTES # BLD: 3.8 K/UL (ref 1–5.1)
LYMPHOCYTES NFR BLD: 22.5 %
MCH RBC QN AUTO: 34.6 PG (ref 26–34)
MCHC RBC AUTO-ENTMCNC: 36.6 G/DL (ref 31–36)
MCV RBC AUTO: 94.5 FL (ref 80–100)
MONOCYTES # BLD: 1.4 K/UL (ref 0–1.3)
MONOCYTES NFR BLD: 8.6 %
NEUTROPHILS # BLD: 11 K/UL (ref 1.7–7.7)
NEUTROPHILS NFR BLD: 66.2 %
PLATELET # BLD AUTO: 372 K/UL (ref 135–450)
PMV BLD AUTO: 7.8 FL (ref 5–10.5)
POTASSIUM SERPL-SCNC: 3.8 MMOL/L (ref 3.5–5.1)
RBC # BLD AUTO: 2.28 M/UL (ref 4.2–5.9)
SICKLE CELLS BLD QL SMEAR: ABNORMAL
SLIDE REVIEW: ABNORMAL
SODIUM SERPL-SCNC: 140 MMOL/L (ref 136–145)
WBC # BLD AUTO: 16.7 K/UL (ref 4–11)

## 2024-04-18 PROCEDURE — 1200000000 HC SEMI PRIVATE

## 2024-04-18 PROCEDURE — 2580000003 HC RX 258: Performed by: INTERNAL MEDICINE

## 2024-04-18 PROCEDURE — 2580000003 HC RX 258: Performed by: STUDENT IN AN ORGANIZED HEALTH CARE EDUCATION/TRAINING PROGRAM

## 2024-04-18 PROCEDURE — 85025 COMPLETE CBC W/AUTO DIFF WBC: CPT

## 2024-04-18 PROCEDURE — 6370000000 HC RX 637 (ALT 250 FOR IP): Performed by: INTERNAL MEDICINE

## 2024-04-18 PROCEDURE — 93005 ELECTROCARDIOGRAM TRACING: CPT | Performed by: STUDENT IN AN ORGANIZED HEALTH CARE EDUCATION/TRAINING PROGRAM

## 2024-04-18 PROCEDURE — 71045 X-RAY EXAM CHEST 1 VIEW: CPT

## 2024-04-18 PROCEDURE — 80048 BASIC METABOLIC PNL TOTAL CA: CPT

## 2024-04-18 PROCEDURE — 36415 COLL VENOUS BLD VENIPUNCTURE: CPT

## 2024-04-18 RX ADMIN — HYDROXYUREA 1000 MG: 500 CAPSULE ORAL at 09:49

## 2024-04-18 RX ADMIN — PANTOPRAZOLE SODIUM 40 MG: 40 TABLET, DELAYED RELEASE ORAL at 06:18

## 2024-04-18 RX ADMIN — SODIUM CHLORIDE: 4.5 INJECTION, SOLUTION INTRAVENOUS at 02:22

## 2024-04-18 RX ADMIN — Medication 10 ML: at 20:42

## 2024-04-18 RX ADMIN — APIXABAN 5 MG: 5 TABLET, FILM COATED ORAL at 20:42

## 2024-04-18 RX ADMIN — Medication 10 ML: at 09:49

## 2024-04-18 RX ADMIN — SODIUM CHLORIDE: 4.5 INJECTION, SOLUTION INTRAVENOUS at 09:48

## 2024-04-18 RX ADMIN — SODIUM CHLORIDE: 4.5 INJECTION, SOLUTION INTRAVENOUS at 20:36

## 2024-04-18 RX ADMIN — HYDROXYUREA 1000 MG: 500 CAPSULE ORAL at 20:42

## 2024-04-18 RX ADMIN — APIXABAN 5 MG: 5 TABLET, FILM COATED ORAL at 09:49

## 2024-04-18 ASSESSMENT — PAIN DESCRIPTION - DESCRIPTORS
DESCRIPTORS: ACHING
DESCRIPTORS: ACHING

## 2024-04-18 ASSESSMENT — PAIN SCALES - GENERAL
PAINLEVEL_OUTOF10: 8
PAINLEVEL_OUTOF10: 8
PAINLEVEL_OUTOF10: 6

## 2024-04-18 ASSESSMENT — PAIN DESCRIPTION - LOCATION
LOCATION: CHEST;BACK
LOCATION: CHEST

## 2024-04-18 ASSESSMENT — PAIN DESCRIPTION - PAIN TYPE: TYPE: ACUTE PAIN

## 2024-04-19 LAB
ALBUMIN SERPL-MCNC: 3.9 G/DL (ref 3.4–5)
ALP SERPL-CCNC: 132 U/L (ref 40–129)
ALT SERPL-CCNC: 7 U/L (ref 10–40)
ANION GAP SERPL CALCULATED.3IONS-SCNC: 9 MMOL/L (ref 3–16)
AST SERPL-CCNC: 21 U/L (ref 15–37)
BASOPHILS # BLD: 0.2 K/UL (ref 0–0.2)
BASOPHILS NFR BLD: 1.2 %
BILIRUB DIRECT SERPL-MCNC: 0.3 MG/DL (ref 0–0.3)
BILIRUB INDIRECT SERPL-MCNC: 3.3 MG/DL (ref 0–1)
BILIRUB SERPL-MCNC: 3.6 MG/DL (ref 0–1)
BUN SERPL-MCNC: 7 MG/DL (ref 7–20)
CALCIUM SERPL-MCNC: 9.1 MG/DL (ref 8.3–10.6)
CHLORIDE SERPL-SCNC: 107 MMOL/L (ref 99–110)
CO2 SERPL-SCNC: 25 MMOL/L (ref 21–32)
CREAT SERPL-MCNC: 0.6 MG/DL (ref 0.9–1.3)
DEPRECATED RDW RBC AUTO: 24.8 % (ref 12.4–15.4)
EKG ATRIAL RATE: 67 BPM
EKG DIAGNOSIS: NORMAL
EKG P AXIS: 0 DEGREES
EKG P-R INTERVAL: 146 MS
EKG Q-T INTERVAL: 380 MS
EKG QRS DURATION: 96 MS
EKG QTC CALCULATION (BAZETT): 401 MS
EKG R AXIS: 72 DEGREES
EKG T AXIS: 13 DEGREES
EKG VENTRICULAR RATE: 67 BPM
EOSINOPHIL # BLD: 0.3 K/UL (ref 0–0.6)
EOSINOPHIL NFR BLD: 2 %
GFR SERPLBLD CREATININE-BSD FMLA CKD-EPI: >90 ML/MIN/{1.73_M2}
GLUCOSE SERPL-MCNC: 102 MG/DL (ref 70–99)
HCT VFR BLD AUTO: 22.3 % (ref 40.5–52.5)
HGB BLD-MCNC: 7.7 G/DL (ref 13.5–17.5)
LYMPHOCYTES # BLD: 3.2 K/UL (ref 1–5.1)
LYMPHOCYTES NFR BLD: 24 %
MCH RBC QN AUTO: 33.6 PG (ref 26–34)
MCHC RBC AUTO-ENTMCNC: 34.5 G/DL (ref 31–36)
MCV RBC AUTO: 97.3 FL (ref 80–100)
MONOCYTES # BLD: 1.2 K/UL (ref 0–1.3)
MONOCYTES NFR BLD: 8.7 %
NEUTROPHILS # BLD: 8.6 K/UL (ref 1.7–7.7)
NEUTROPHILS NFR BLD: 64.1 %
PLATELET # BLD AUTO: 413 K/UL (ref 135–450)
PMV BLD AUTO: 7.9 FL (ref 5–10.5)
POTASSIUM SERPL-SCNC: 3.8 MMOL/L (ref 3.5–5.1)
PROT SERPL-MCNC: 6.2 G/DL (ref 6.4–8.2)
RBC # BLD AUTO: 2.29 M/UL (ref 4.2–5.9)
SODIUM SERPL-SCNC: 141 MMOL/L (ref 136–145)
WBC # BLD AUTO: 13.4 K/UL (ref 4–11)

## 2024-04-19 PROCEDURE — 6370000000 HC RX 637 (ALT 250 FOR IP): Performed by: INTERNAL MEDICINE

## 2024-04-19 PROCEDURE — 1200000000 HC SEMI PRIVATE

## 2024-04-19 PROCEDURE — 80076 HEPATIC FUNCTION PANEL: CPT

## 2024-04-19 PROCEDURE — 36415 COLL VENOUS BLD VENIPUNCTURE: CPT

## 2024-04-19 PROCEDURE — 2580000003 HC RX 258: Performed by: INTERNAL MEDICINE

## 2024-04-19 PROCEDURE — 94760 N-INVAS EAR/PLS OXIMETRY 1: CPT

## 2024-04-19 PROCEDURE — 93010 ELECTROCARDIOGRAM REPORT: CPT | Performed by: INTERNAL MEDICINE

## 2024-04-19 PROCEDURE — 80048 BASIC METABOLIC PNL TOTAL CA: CPT

## 2024-04-19 PROCEDURE — 85025 COMPLETE CBC W/AUTO DIFF WBC: CPT

## 2024-04-19 PROCEDURE — 6360000002 HC RX W HCPCS: Performed by: STUDENT IN AN ORGANIZED HEALTH CARE EDUCATION/TRAINING PROGRAM

## 2024-04-19 PROCEDURE — 2580000003 HC RX 258: Performed by: STUDENT IN AN ORGANIZED HEALTH CARE EDUCATION/TRAINING PROGRAM

## 2024-04-19 RX ADMIN — Medication: at 22:43

## 2024-04-19 RX ADMIN — HYDROXYUREA 1000 MG: 500 CAPSULE ORAL at 07:40

## 2024-04-19 RX ADMIN — HYDROXYUREA 1000 MG: 500 CAPSULE ORAL at 20:42

## 2024-04-19 RX ADMIN — Medication 10 ML: at 07:40

## 2024-04-19 RX ADMIN — APIXABAN 5 MG: 5 TABLET, FILM COATED ORAL at 07:40

## 2024-04-19 RX ADMIN — Medication 10 ML: at 20:42

## 2024-04-19 RX ADMIN — APIXABAN 5 MG: 5 TABLET, FILM COATED ORAL at 20:42

## 2024-04-19 RX ADMIN — SODIUM CHLORIDE: 4.5 INJECTION, SOLUTION INTRAVENOUS at 20:41

## 2024-04-19 RX ADMIN — PANTOPRAZOLE SODIUM 40 MG: 40 TABLET, DELAYED RELEASE ORAL at 05:36

## 2024-04-19 ASSESSMENT — PAIN SCALES - GENERAL
PAINLEVEL_OUTOF10: 8
PAINLEVEL_OUTOF10: 9
PAINLEVEL_OUTOF10: 10
PAINLEVEL_OUTOF10: 8

## 2024-04-19 ASSESSMENT — PAIN DESCRIPTION - LOCATION
LOCATION: CHEST

## 2024-04-19 ASSESSMENT — PAIN DESCRIPTION - DESCRIPTORS: DESCRIPTORS: ACHING

## 2024-04-20 PROCEDURE — 6370000000 HC RX 637 (ALT 250 FOR IP): Performed by: INTERNAL MEDICINE

## 2024-04-20 PROCEDURE — 2580000003 HC RX 258: Performed by: STUDENT IN AN ORGANIZED HEALTH CARE EDUCATION/TRAINING PROGRAM

## 2024-04-20 PROCEDURE — 6370000000 HC RX 637 (ALT 250 FOR IP): Performed by: NURSE PRACTITIONER

## 2024-04-20 PROCEDURE — 6360000002 HC RX W HCPCS: Performed by: STUDENT IN AN ORGANIZED HEALTH CARE EDUCATION/TRAINING PROGRAM

## 2024-04-20 PROCEDURE — 1200000000 HC SEMI PRIVATE

## 2024-04-20 PROCEDURE — 2580000003 HC RX 258: Performed by: INTERNAL MEDICINE

## 2024-04-20 RX ORDER — OXYCODONE HYDROCHLORIDE 5 MG/1
10 TABLET ORAL
Status: COMPLETED | OUTPATIENT
Start: 2024-04-20 | End: 2024-04-20

## 2024-04-20 RX ORDER — KETOROLAC TROMETHAMINE 30 MG/ML
30 INJECTION, SOLUTION INTRAMUSCULAR; INTRAVENOUS EVERY 6 HOURS PRN
Status: DISCONTINUED | OUTPATIENT
Start: 2024-04-20 | End: 2024-04-20

## 2024-04-20 RX ORDER — OXYCODONE HYDROCHLORIDE 5 MG/1
10 TABLET ORAL EVERY 6 HOURS PRN
Status: DISCONTINUED | OUTPATIENT
Start: 2024-04-20 | End: 2024-04-23

## 2024-04-20 RX ADMIN — HYDROXYUREA 1000 MG: 500 CAPSULE ORAL at 08:20

## 2024-04-20 RX ADMIN — APIXABAN 5 MG: 5 TABLET, FILM COATED ORAL at 20:56

## 2024-04-20 RX ADMIN — SODIUM CHLORIDE: 4.5 INJECTION, SOLUTION INTRAVENOUS at 10:12

## 2024-04-20 RX ADMIN — Medication 10 ML: at 20:05

## 2024-04-20 RX ADMIN — Medication: at 12:34

## 2024-04-20 RX ADMIN — PANTOPRAZOLE SODIUM 40 MG: 40 TABLET, DELAYED RELEASE ORAL at 07:25

## 2024-04-20 RX ADMIN — OXYCODONE 10 MG: 5 TABLET ORAL at 01:36

## 2024-04-20 RX ADMIN — OXYCODONE 10 MG: 5 TABLET ORAL at 14:04

## 2024-04-20 RX ADMIN — OXYCODONE 10 MG: 5 TABLET ORAL at 08:14

## 2024-04-20 RX ADMIN — OXYCODONE 10 MG: 5 TABLET ORAL at 20:03

## 2024-04-20 RX ADMIN — HYDROXYUREA 1000 MG: 500 CAPSULE ORAL at 20:55

## 2024-04-20 RX ADMIN — APIXABAN 5 MG: 5 TABLET, FILM COATED ORAL at 08:15

## 2024-04-20 ASSESSMENT — PAIN DESCRIPTION - DESCRIPTORS: DESCRIPTORS: ACHING

## 2024-04-20 ASSESSMENT — PAIN SCALES - GENERAL
PAINLEVEL_OUTOF10: 7
PAINLEVEL_OUTOF10: 10

## 2024-04-20 ASSESSMENT — PAIN DESCRIPTION - ONSET: ONSET: ON-GOING

## 2024-04-20 ASSESSMENT — PAIN DESCRIPTION - PAIN TYPE: TYPE: ACUTE PAIN

## 2024-04-20 ASSESSMENT — PAIN DESCRIPTION - FREQUENCY: FREQUENCY: CONTINUOUS

## 2024-04-20 ASSESSMENT — PAIN DESCRIPTION - LOCATION
LOCATION: CHEST

## 2024-04-21 LAB
ANION GAP SERPL CALCULATED.3IONS-SCNC: 12 MMOL/L (ref 3–16)
BASOPHILS # BLD: 0.1 K/UL (ref 0–0.2)
BASOPHILS NFR BLD: 0.9 %
BUN SERPL-MCNC: 9 MG/DL (ref 7–20)
CALCIUM SERPL-MCNC: 9.4 MG/DL (ref 8.3–10.6)
CHLORIDE SERPL-SCNC: 105 MMOL/L (ref 99–110)
CO2 SERPL-SCNC: 23 MMOL/L (ref 21–32)
CREAT SERPL-MCNC: 0.6 MG/DL (ref 0.9–1.3)
DEPRECATED RDW RBC AUTO: 25.6 % (ref 12.4–15.4)
EOSINOPHIL # BLD: 0.2 K/UL (ref 0–0.6)
EOSINOPHIL NFR BLD: 1.4 %
GFR SERPLBLD CREATININE-BSD FMLA CKD-EPI: >90 ML/MIN/{1.73_M2}
GLUCOSE SERPL-MCNC: 115 MG/DL (ref 70–99)
HCT VFR BLD AUTO: 22.5 % (ref 40.5–52.5)
HGB BLD-MCNC: 8 G/DL (ref 13.5–17.5)
LYMPHOCYTES # BLD: 2.5 K/UL (ref 1–5.1)
LYMPHOCYTES NFR BLD: 16.7 %
MCH RBC QN AUTO: 35.6 PG (ref 26–34)
MCHC RBC AUTO-ENTMCNC: 35.7 G/DL (ref 31–36)
MCV RBC AUTO: 99.9 FL (ref 80–100)
MONOCYTES # BLD: 1.1 K/UL (ref 0–1.3)
MONOCYTES NFR BLD: 7.4 %
NEUTROPHILS # BLD: 10.9 K/UL (ref 1.7–7.7)
NEUTROPHILS NFR BLD: 73.6 %
PLATELET # BLD AUTO: 412 K/UL (ref 135–450)
PMV BLD AUTO: 8 FL (ref 5–10.5)
POTASSIUM SERPL-SCNC: 3.9 MMOL/L (ref 3.5–5.1)
RBC # BLD AUTO: 2.26 M/UL (ref 4.2–5.9)
SODIUM SERPL-SCNC: 140 MMOL/L (ref 136–145)
WBC # BLD AUTO: 14.9 K/UL (ref 4–11)

## 2024-04-21 PROCEDURE — 85025 COMPLETE CBC W/AUTO DIFF WBC: CPT

## 2024-04-21 PROCEDURE — 2580000003 HC RX 258: Performed by: INTERNAL MEDICINE

## 2024-04-21 PROCEDURE — 2580000003 HC RX 258: Performed by: STUDENT IN AN ORGANIZED HEALTH CARE EDUCATION/TRAINING PROGRAM

## 2024-04-21 PROCEDURE — 80048 BASIC METABOLIC PNL TOTAL CA: CPT

## 2024-04-21 PROCEDURE — 36415 COLL VENOUS BLD VENIPUNCTURE: CPT

## 2024-04-21 PROCEDURE — 1200000000 HC SEMI PRIVATE

## 2024-04-21 PROCEDURE — 6370000000 HC RX 637 (ALT 250 FOR IP): Performed by: INTERNAL MEDICINE

## 2024-04-21 RX ADMIN — PANTOPRAZOLE SODIUM 40 MG: 40 TABLET, DELAYED RELEASE ORAL at 06:13

## 2024-04-21 RX ADMIN — APIXABAN 5 MG: 5 TABLET, FILM COATED ORAL at 21:06

## 2024-04-21 RX ADMIN — OXYCODONE 10 MG: 5 TABLET ORAL at 21:06

## 2024-04-21 RX ADMIN — Medication 10 ML: at 21:11

## 2024-04-21 RX ADMIN — HYDROXYUREA 1000 MG: 500 CAPSULE ORAL at 08:34

## 2024-04-21 RX ADMIN — APIXABAN 5 MG: 5 TABLET, FILM COATED ORAL at 08:34

## 2024-04-21 RX ADMIN — OXYCODONE 10 MG: 5 TABLET ORAL at 02:01

## 2024-04-21 RX ADMIN — OXYCODONE 10 MG: 5 TABLET ORAL at 14:50

## 2024-04-21 RX ADMIN — OXYCODONE 10 MG: 5 TABLET ORAL at 08:34

## 2024-04-21 RX ADMIN — SODIUM CHLORIDE: 4.5 INJECTION, SOLUTION INTRAVENOUS at 12:36

## 2024-04-21 RX ADMIN — HYDROXYUREA 1000 MG: 500 CAPSULE ORAL at 21:58

## 2024-04-21 ASSESSMENT — PAIN SCALES - GENERAL
PAINLEVEL_OUTOF10: 7
PAINLEVEL_OUTOF10: 7
PAINLEVEL_OUTOF10: 8
PAINLEVEL_OUTOF10: 7

## 2024-04-21 ASSESSMENT — PAIN DESCRIPTION - ONSET
ONSET: ON-GOING
ONSET: ON-GOING

## 2024-04-21 ASSESSMENT — PAIN DESCRIPTION - DESCRIPTORS
DESCRIPTORS: ACHING
DESCRIPTORS: ACHING

## 2024-04-21 ASSESSMENT — PAIN DESCRIPTION - FREQUENCY
FREQUENCY: CONTINUOUS
FREQUENCY: CONTINUOUS

## 2024-04-21 ASSESSMENT — PAIN DESCRIPTION - LOCATION
LOCATION: CHEST

## 2024-04-22 PROCEDURE — 94761 N-INVAS EAR/PLS OXIMETRY MLT: CPT

## 2024-04-22 PROCEDURE — 6360000002 HC RX W HCPCS: Performed by: STUDENT IN AN ORGANIZED HEALTH CARE EDUCATION/TRAINING PROGRAM

## 2024-04-22 PROCEDURE — 6370000000 HC RX 637 (ALT 250 FOR IP): Performed by: INTERNAL MEDICINE

## 2024-04-22 PROCEDURE — 2580000003 HC RX 258: Performed by: STUDENT IN AN ORGANIZED HEALTH CARE EDUCATION/TRAINING PROGRAM

## 2024-04-22 PROCEDURE — 1200000000 HC SEMI PRIVATE

## 2024-04-22 PROCEDURE — 2580000003 HC RX 258: Performed by: INTERNAL MEDICINE

## 2024-04-22 RX ADMIN — Medication: at 18:41

## 2024-04-22 RX ADMIN — OXYCODONE 10 MG: 5 TABLET ORAL at 05:26

## 2024-04-22 RX ADMIN — HYDROXYUREA 1000 MG: 500 CAPSULE ORAL at 08:06

## 2024-04-22 RX ADMIN — HYDROXYUREA 1000 MG: 500 CAPSULE ORAL at 19:32

## 2024-04-22 RX ADMIN — PANTOPRAZOLE SODIUM 40 MG: 40 TABLET, DELAYED RELEASE ORAL at 05:27

## 2024-04-22 RX ADMIN — Medication 10 ML: at 08:08

## 2024-04-22 RX ADMIN — APIXABAN 5 MG: 5 TABLET, FILM COATED ORAL at 08:06

## 2024-04-22 RX ADMIN — OXYCODONE 10 MG: 5 TABLET ORAL at 11:57

## 2024-04-22 RX ADMIN — OXYCODONE 10 MG: 5 TABLET ORAL at 18:16

## 2024-04-22 RX ADMIN — APIXABAN 5 MG: 5 TABLET, FILM COATED ORAL at 19:32

## 2024-04-22 RX ADMIN — SODIUM CHLORIDE: 4.5 INJECTION, SOLUTION INTRAVENOUS at 00:39

## 2024-04-22 ASSESSMENT — PAIN SCALES - GENERAL
PAINLEVEL_OUTOF10: 7
PAINLEVEL_OUTOF10: 7
PAINLEVEL_OUTOF10: 9
PAINLEVEL_OUTOF10: 8
PAINLEVEL_OUTOF10: 8

## 2024-04-23 LAB
ABO + RH BLD: NORMAL
ALBUMIN SERPL-MCNC: 3.9 G/DL (ref 3.4–5)
ALBUMIN/GLOB SERPL: 1.6 {RATIO} (ref 1.1–2.2)
ALP SERPL-CCNC: 119 U/L (ref 40–129)
ALT SERPL-CCNC: 11 U/L (ref 10–40)
ANION GAP SERPL CALCULATED.3IONS-SCNC: 10 MMOL/L (ref 3–16)
AST SERPL-CCNC: 31 U/L (ref 15–37)
BILIRUB SERPL-MCNC: 4.5 MG/DL (ref 0–1)
BLD GP AB SCN SERPL QL: NORMAL
BUN SERPL-MCNC: 10 MG/DL (ref 7–20)
CALCIUM SERPL-MCNC: 9.2 MG/DL (ref 8.3–10.6)
CHLORIDE SERPL-SCNC: 103 MMOL/L (ref 99–110)
CO2 SERPL-SCNC: 26 MMOL/L (ref 21–32)
CREAT SERPL-MCNC: 0.6 MG/DL (ref 0.9–1.3)
DEPRECATED RDW RBC AUTO: 26.8 % (ref 12.4–15.4)
GFR SERPLBLD CREATININE-BSD FMLA CKD-EPI: >90 ML/MIN/{1.73_M2}
GLUCOSE SERPL-MCNC: 98 MG/DL (ref 70–99)
HCT VFR BLD AUTO: 19.3 % (ref 40.5–52.5)
HCT VFR BLD AUTO: 20.7 % (ref 40.5–52.5)
HCT VFR BLD AUTO: 21.3 % (ref 40.5–52.5)
HGB BLD-MCNC: 6.7 G/DL (ref 13.5–17.5)
HGB BLD-MCNC: 7.4 G/DL (ref 13.5–17.5)
IMMATURE RETIC FRACT: 0.75 (ref 0.21–0.37)
MAGNESIUM SERPL-MCNC: 1.8 MG/DL (ref 1.8–2.4)
MCH RBC QN AUTO: 35.3 PG (ref 26–34)
MCHC RBC AUTO-ENTMCNC: 34.8 G/DL (ref 31–36)
MCV RBC AUTO: 101.4 FL (ref 80–100)
PHOSPHATE SERPL-MCNC: 4.5 MG/DL (ref 2.5–4.9)
PLATELET # BLD AUTO: 397 K/UL (ref 135–450)
PLATELET BLD QL SMEAR: ADEQUATE
PMV BLD AUTO: 8.1 FL (ref 5–10.5)
POTASSIUM SERPL-SCNC: 3.9 MMOL/L (ref 3.5–5.1)
PROT SERPL-MCNC: 6.3 G/DL (ref 6.4–8.2)
RBC # BLD AUTO: 1.9 M/UL (ref 4.2–5.9)
RETICS # AUTO: 0.12 M/UL
RETICS/RBC NFR AUTO: 5.87 % (ref 0.5–2.18)
SLIDE REVIEW: ABNORMAL
SODIUM SERPL-SCNC: 139 MMOL/L (ref 136–145)
WBC # BLD AUTO: 9.5 K/UL (ref 4–11)

## 2024-04-23 PROCEDURE — 83735 ASSAY OF MAGNESIUM: CPT

## 2024-04-23 PROCEDURE — 2580000003 HC RX 258: Performed by: STUDENT IN AN ORGANIZED HEALTH CARE EDUCATION/TRAINING PROGRAM

## 2024-04-23 PROCEDURE — 6370000000 HC RX 637 (ALT 250 FOR IP): Performed by: INTERNAL MEDICINE

## 2024-04-23 PROCEDURE — 85045 AUTOMATED RETICULOCYTE COUNT: CPT

## 2024-04-23 PROCEDURE — 85014 HEMATOCRIT: CPT

## 2024-04-23 PROCEDURE — 86850 RBC ANTIBODY SCREEN: CPT

## 2024-04-23 PROCEDURE — 86900 BLOOD TYPING SEROLOGIC ABO: CPT

## 2024-04-23 PROCEDURE — 85018 HEMOGLOBIN: CPT

## 2024-04-23 PROCEDURE — 2580000003 HC RX 258: Performed by: INTERNAL MEDICINE

## 2024-04-23 PROCEDURE — 86901 BLOOD TYPING SEROLOGIC RH(D): CPT

## 2024-04-23 PROCEDURE — 1200000000 HC SEMI PRIVATE

## 2024-04-23 PROCEDURE — 84100 ASSAY OF PHOSPHORUS: CPT

## 2024-04-23 PROCEDURE — 85027 COMPLETE CBC AUTOMATED: CPT

## 2024-04-23 PROCEDURE — 36415 COLL VENOUS BLD VENIPUNCTURE: CPT

## 2024-04-23 PROCEDURE — 80053 COMPREHEN METABOLIC PANEL: CPT

## 2024-04-23 RX ADMIN — APIXABAN 5 MG: 5 TABLET, FILM COATED ORAL at 20:57

## 2024-04-23 RX ADMIN — HYDROXYUREA 1000 MG: 500 CAPSULE ORAL at 07:47

## 2024-04-23 RX ADMIN — PANTOPRAZOLE SODIUM 40 MG: 40 TABLET, DELAYED RELEASE ORAL at 05:15

## 2024-04-23 RX ADMIN — Medication 10 ML: at 07:48

## 2024-04-23 RX ADMIN — SODIUM CHLORIDE: 4.5 INJECTION, SOLUTION INTRAVENOUS at 03:13

## 2024-04-23 RX ADMIN — HYDROXYUREA 1000 MG: 500 CAPSULE ORAL at 20:57

## 2024-04-23 RX ADMIN — OXYCODONE HYDROCHLORIDE 20 MG: 15 TABLET ORAL at 13:26

## 2024-04-23 RX ADMIN — OXYCODONE 10 MG: 5 TABLET ORAL at 06:20

## 2024-04-23 RX ADMIN — SODIUM CHLORIDE: 4.5 INJECTION, SOLUTION INTRAVENOUS at 16:49

## 2024-04-23 RX ADMIN — OXYCODONE HYDROCHLORIDE 20 MG: 15 TABLET ORAL at 20:11

## 2024-04-23 ASSESSMENT — PAIN SCALES - GENERAL
PAINLEVEL_OUTOF10: 5
PAINLEVEL_OUTOF10: 7
PAINLEVEL_OUTOF10: 7
PAINLEVEL_OUTOF10: 9

## 2024-04-23 ASSESSMENT — PAIN - FUNCTIONAL ASSESSMENT: PAIN_FUNCTIONAL_ASSESSMENT: ACTIVITIES ARE NOT PREVENTED

## 2024-04-23 ASSESSMENT — PAIN DESCRIPTION - LOCATION: LOCATION: CHEST

## 2024-04-23 ASSESSMENT — PAIN DESCRIPTION - DESCRIPTORS: DESCRIPTORS: DISCOMFORT

## 2024-04-23 ASSESSMENT — PAIN DESCRIPTION - ORIENTATION: ORIENTATION: MID

## 2024-04-24 VITALS
HEART RATE: 83 BPM | WEIGHT: 176 LBS | OXYGEN SATURATION: 97 % | DIASTOLIC BLOOD PRESSURE: 68 MMHG | RESPIRATION RATE: 16 BRPM | BODY MASS INDEX: 26.07 KG/M2 | HEIGHT: 69 IN | TEMPERATURE: 98.4 F | SYSTOLIC BLOOD PRESSURE: 111 MMHG

## 2024-04-24 LAB
BASOPHILS # BLD: 0.1 K/UL (ref 0–0.2)
BASOPHILS NFR BLD: 1.1 %
DEPRECATED RDW RBC AUTO: 28.3 % (ref 12.4–15.4)
EOSINOPHIL # BLD: 0.1 K/UL (ref 0–0.6)
EOSINOPHIL NFR BLD: 1.3 %
HCT VFR BLD AUTO: 20.5 % (ref 40.5–52.5)
HGB BLD-MCNC: 7.2 G/DL (ref 13.5–17.5)
IMMATURE RETIC FRACT: 0.72 (ref 0.21–0.37)
LYMPHOCYTES # BLD: 2.4 K/UL (ref 1–5.1)
LYMPHOCYTES NFR BLD: 30.5 %
MCH RBC QN AUTO: 37.3 PG (ref 26–34)
MCHC RBC AUTO-ENTMCNC: 35.2 G/DL (ref 31–36)
MCV RBC AUTO: 106 FL (ref 80–100)
MONOCYTES # BLD: 0.8 K/UL (ref 0–1.3)
MONOCYTES NFR BLD: 9.6 %
NEUTROPHILS # BLD: 4.6 K/UL (ref 1.7–7.7)
NEUTROPHILS NFR BLD: 57.5 %
PLATELET # BLD AUTO: 440 K/UL (ref 135–450)
PMV BLD AUTO: 8.3 FL (ref 5–10.5)
RBC # BLD AUTO: 1.93 M/UL (ref 4.2–5.9)
RETICS # AUTO: 0.15 M/UL
RETICS/RBC NFR AUTO: 7.56 % (ref 0.5–2.18)
WBC # BLD AUTO: 8 K/UL (ref 4–11)

## 2024-04-24 PROCEDURE — 6370000000 HC RX 637 (ALT 250 FOR IP): Performed by: INTERNAL MEDICINE

## 2024-04-24 PROCEDURE — 85025 COMPLETE CBC W/AUTO DIFF WBC: CPT

## 2024-04-24 PROCEDURE — 94760 N-INVAS EAR/PLS OXIMETRY 1: CPT

## 2024-04-24 PROCEDURE — 85045 AUTOMATED RETICULOCYTE COUNT: CPT

## 2024-04-24 PROCEDURE — 2580000003 HC RX 258: Performed by: STUDENT IN AN ORGANIZED HEALTH CARE EDUCATION/TRAINING PROGRAM

## 2024-04-24 PROCEDURE — 36415 COLL VENOUS BLD VENIPUNCTURE: CPT

## 2024-04-24 RX ADMIN — PANTOPRAZOLE SODIUM 40 MG: 40 TABLET, DELAYED RELEASE ORAL at 05:29

## 2024-04-24 RX ADMIN — APIXABAN 5 MG: 5 TABLET, FILM COATED ORAL at 08:24

## 2024-04-24 RX ADMIN — HYDROXYUREA 1000 MG: 500 CAPSULE ORAL at 08:24

## 2024-04-24 RX ADMIN — SODIUM CHLORIDE: 4.5 INJECTION, SOLUTION INTRAVENOUS at 05:30

## 2024-04-24 RX ADMIN — OXYCODONE HYDROCHLORIDE 20 MG: 15 TABLET ORAL at 08:28

## 2024-04-24 RX ADMIN — OXYCODONE HYDROCHLORIDE 20 MG: 15 TABLET ORAL at 02:09

## 2024-04-24 RX ADMIN — OXYCODONE HYDROCHLORIDE 20 MG: 15 TABLET ORAL at 13:49

## 2024-04-24 ASSESSMENT — PAIN - FUNCTIONAL ASSESSMENT
PAIN_FUNCTIONAL_ASSESSMENT: ACTIVITIES ARE NOT PREVENTED

## 2024-04-24 ASSESSMENT — PAIN DESCRIPTION - DESCRIPTORS
DESCRIPTORS: SHARP;ACHING
DESCRIPTORS: DISCOMFORT
DESCRIPTORS: DISCOMFORT;SHARP

## 2024-04-24 ASSESSMENT — PAIN SCALES - GENERAL
PAINLEVEL_OUTOF10: 8
PAINLEVEL_OUTOF10: 7
PAINLEVEL_OUTOF10: 8

## 2024-04-24 ASSESSMENT — PAIN DESCRIPTION - LOCATION
LOCATION: CHEST;BACK
LOCATION: BACK;CHEST
LOCATION: CHEST

## 2024-04-24 ASSESSMENT — PAIN DESCRIPTION - ORIENTATION
ORIENTATION: MID

## 2024-04-24 NOTE — PLAN OF CARE
Problem: Safety - Adult  Goal: Free from fall injury  4/18/2024 0148 by Gloria June RN  Outcome: Progressing  4/17/2024 1819 by Uche Corrigan RN  Outcome: Progressing  Flowsheets (Taken 4/17/2024 1819)  Free From Fall Injury: Instruct family/caregiver on patient safety     Problem: Pain  Goal: Verbalizes/displays adequate comfort level or baseline comfort level  4/18/2024 0148 by Gloria June RN  Outcome: Progressing  4/17/2024 1819 by Uche Corrigan RN  Outcome: Progressing  Flowsheets (Taken 4/17/2024 1819)  Verbalizes/displays adequate comfort level or baseline comfort level:   Encourage patient to monitor pain and request assistance   Assess pain using appropriate pain scale   Administer analgesics based on type and severity of pain and evaluate response   Implement non-pharmacological measures as appropriate and evaluate response     
  Problem: Safety - Adult  Goal: Free from fall injury  4/18/2024 0955 by Paty Jovel RN  Outcome: Progressing  4/18/2024 0148 by Gloria June RN  Outcome: Progressing     Problem: Pain  Goal: Verbalizes/displays adequate comfort level or baseline comfort level  4/18/2024 0955 by Paty Jovel RN  Outcome: Progressing  4/18/2024 0148 by Gloria June RN  Outcome: Progressing     Problem: Neurosensory - Adult  Goal: Achieves stable or improved neurological status  Outcome: Progressing  Goal: Remains free of injury related to seizures activity  Outcome: Progressing  Goal: Achieves maximal functionality and self care  Outcome: Progressing     Problem: Respiratory - Adult  Goal: Achieves optimal ventilation and oxygenation  Outcome: Progressing     Problem: Cardiovascular - Adult  Goal: Maintains optimal cardiac output and hemodynamic stability  Outcome: Progressing  Goal: Absence of cardiac dysrhythmias or at baseline  Outcome: Progressing     Problem: Skin/Tissue Integrity - Adult  Goal: Skin integrity remains intact  Outcome: Progressing  Goal: Incisions, wounds, or drain sites healing without S/S of infection  Outcome: Progressing  Goal: Oral mucous membranes remain intact  Outcome: Progressing     Problem: Musculoskeletal - Adult  Goal: Return mobility to safest level of function  Outcome: Progressing  Goal: Maintain proper alignment of affected body part  Outcome: Progressing  Goal: Return ADL status to a safe level of function  Outcome: Progressing     Problem: Gastrointestinal - Adult  Goal: Minimal or absence of nausea and vomiting  Outcome: Progressing  Goal: Maintains or returns to baseline bowel function  Outcome: Progressing  Goal: Maintains adequate nutritional intake  Outcome: Progressing     Problem: Genitourinary - Adult  Goal: Absence of urinary retention  Outcome: Progressing     Problem: Infection - Adult  Goal: Absence of infection at discharge  Outcome: Progressing  Goal: Absence of 
  Problem: Safety - Adult  Goal: Free from fall injury  4/19/2024 0753 by Paty Jovel RN  Outcome: Progressing  4/19/2024 0137 by Nataliia Ramirez RN  Outcome: Progressing     Problem: Pain  Goal: Verbalizes/displays adequate comfort level or baseline comfort level  4/19/2024 0753 by Paty Jovel RN  Outcome: Progressing  4/19/2024 0137 by Nataliia Ramirez RN  Outcome: Not Progressing     Problem: Neurosensory - Adult  Goal: Achieves stable or improved neurological status  4/19/2024 0753 by Paty Jovel RN  Outcome: Progressing  4/19/2024 0137 by Nataliia Ramirez RN  Outcome: Progressing  Goal: Remains free of injury related to seizures activity  4/19/2024 0753 by Paty Jovel RN  Outcome: Progressing  4/19/2024 0137 by Nataliia Ramirez RN  Outcome: Progressing  Goal: Achieves maximal functionality and self care  4/19/2024 0753 by Paty Jovel RN  Outcome: Progressing  4/19/2024 0137 by Nataliia Ramirez RN  Outcome: Progressing     Problem: Respiratory - Adult  Goal: Achieves optimal ventilation and oxygenation  4/19/2024 0753 by Paty Jovel RN  Outcome: Progressing  4/19/2024 0137 by Nataliia Ramirez RN  Outcome: Progressing     Problem: Cardiovascular - Adult  Goal: Maintains optimal cardiac output and hemodynamic stability  4/19/2024 0753 by Paty Jovel RN  Outcome: Progressing  4/19/2024 0137 by Nataliia Ramirez RN  Outcome: Progressing  Goal: Absence of cardiac dysrhythmias or at baseline  4/19/2024 0753 by Paty Jovel RN  Outcome: Progressing  4/19/2024 0137 by Nataliia Ramirez RN  Outcome: Progressing     Problem: Skin/Tissue Integrity - Adult  Goal: Skin integrity remains intact  4/19/2024 0753 by Paty Jovel RN  Outcome: Progressing  4/19/2024 0137 by Nataliia Ramirez RN  Outcome: Progressing  Goal: Incisions, wounds, or drain sites healing without S/S of infection  Outcome: Progressing  Goal: Oral mucous membranes remain intact  Outcome: Progressing     Problem: Musculoskeletal - 
  Problem: Safety - Adult  Goal: Free from fall injury  4/20/2024 1731 by Angy Altamirano RN  Outcome: Progressing  4/20/2024 0517 by Nataliia Ramirez RN  Outcome: Progressing     Problem: Pain  Goal: Verbalizes/displays adequate comfort level or baseline comfort level  4/20/2024 1731 by Angy Altamirano RN  Outcome: Progressing  Flowsheets (Taken 4/20/2024 0800)  Verbalizes/displays adequate comfort level or baseline comfort level: Encourage patient to monitor pain and request assistance  4/20/2024 0517 by Nataliia Ramirez RN  Outcome: Not Progressing     Problem: Neurosensory - Adult  Goal: Achieves stable or improved neurological status  Outcome: Progressing  Goal: Remains free of injury related to seizures activity  Outcome: Progressing  Goal: Achieves maximal functionality and self care  Outcome: Progressing     Problem: Respiratory - Adult  Goal: Achieves optimal ventilation and oxygenation  Outcome: Progressing     Problem: Cardiovascular - Adult  Goal: Maintains optimal cardiac output and hemodynamic stability  Outcome: Progressing  Goal: Absence of cardiac dysrhythmias or at baseline  Outcome: Progressing     Problem: Skin/Tissue Integrity - Adult  Goal: Skin integrity remains intact  Outcome: Progressing  Goal: Incisions, wounds, or drain sites healing without S/S of infection  Outcome: Progressing  Goal: Oral mucous membranes remain intact  Outcome: Progressing     Problem: Musculoskeletal - Adult  Goal: Return mobility to safest level of function  Outcome: Progressing  Goal: Maintain proper alignment of affected body part  Outcome: Progressing  Goal: Return ADL status to a safe level of function  Outcome: Progressing     Problem: Gastrointestinal - Adult  Goal: Minimal or absence of nausea and vomiting  Outcome: Progressing  Goal: Maintains or returns to baseline bowel function  Outcome: Progressing  Goal: Maintains adequate nutritional intake  Outcome: Progressing     Problem: Genitourinary - 
  Problem: Safety - Adult  Goal: Free from fall injury  4/22/2024 0019 by Homero Mccabe RN  Outcome: Progressing     Problem: Pain  Goal: Verbalizes/displays adequate comfort level or baseline comfort level  4/22/2024 0019 by Homero Mccabe RN  Outcome: Progressing     Problem: Neurosensory - Adult  Goal: Achieves stable or improved neurological status  4/22/2024 0019 by Homero Mccabe RN  Outcome: Progressing  Goal: Remains free of injury related to seizures activity  4/22/2024 0019 by Homero Mccabe RN  Outcome: Progressing  Goal: Achieves maximal functionality and self care  4/22/2024 0019 by Homero Mccabe RN  Outcome: Progressing     Problem: Respiratory - Adult  Goal: Achieves optimal ventilation and oxygenation  4/22/2024 0019 by Homero Mccabe RN  Outcome: Progressing  Problem: Cardiovascular - Adult  Goal: Maintains optimal cardiac output and hemodynamic stability  4/22/2024 0019 by Homero Mccabe RN  Outcome: Progressing  Goal: Absence of cardiac dysrhythmias or at baseline  4/22/2024 0019 by Homero Mccabe RN  Outcome: Progressing     Problem: Skin/Tissue Integrity - Adult  Goal: Skin integrity remains intact  4/22/2024 0019 by Homero Mccabe RN  Outcome: Progressing  Goal: Incisions, wounds, or drain sites healing without S/S of infection  4/22/2024 0019 by Homero Mccabe RN  Outcome: Progressing  Goal: Oral mucous membranes remain intact  4/22/2024 0019 by Homero Mccabe RN  Outcome: Progressing     Problem: Musculoskeletal - Adult  Goal: Return mobility to safest level of function  4/22/2024 0019 by Homero Mccabe RN  Outcome: Progressing  Goal: Maintain proper alignment of affected body part  4/22/2024 0019 by Homero Mccabe RN  Outcome: Progressing  Goal: Return ADL status to a safe level of function  4/22/2024 0019 by Homero Mccabe RN  Outcome: Progressing     Problem: Gastrointestinal - Adult  Goal: Minimal or absence of nausea 
  Problem: Safety - Adult  Goal: Free from fall injury  Outcome: Progressing     Problem: Neurosensory - Adult  Goal: Achieves stable or improved neurological status  Outcome: Progressing  Goal: Remains free of injury related to seizures activity  Outcome: Progressing  Goal: Achieves maximal functionality and self care  Outcome: Progressing     Problem: Respiratory - Adult  Goal: Achieves optimal ventilation and oxygenation  Outcome: Progressing     Problem: Cardiovascular - Adult  Goal: Maintains optimal cardiac output and hemodynamic stability  Outcome: Progressing  Goal: Absence of cardiac dysrhythmias or at baseline  Outcome: Progressing     Problem: Skin/Tissue Integrity - Adult  Goal: Skin integrity remains intact  Outcome: Progressing     Problem: Musculoskeletal - Adult  Goal: Return mobility to safest level of function  Outcome: Progressing     Problem: Gastrointestinal - Adult  Goal: Minimal or absence of nausea and vomiting  Outcome: Progressing     Problem: Genitourinary - Adult  Goal: Absence of urinary retention  Outcome: Progressing     Problem: Infection - Adult  Goal: Absence of infection at discharge  Outcome: Progressing  Goal: Absence of infection during hospitalization  Outcome: Progressing     Problem: Metabolic/Fluid and Electrolytes - Adult  Goal: Electrolytes maintained within normal limits  Outcome: Progressing     Problem: Hematologic - Adult  Goal: Maintains hematologic stability  Outcome: Progressing     Problem: Pain  Goal: Verbalizes/displays adequate comfort level or baseline comfort level  Outcome: Not Progressing     
  Problem: Safety - Adult  Goal: Free from fall injury  Outcome: Progressing     Problem: Pain  Goal: Verbalizes/displays adequate comfort level or baseline comfort level  Outcome: Not Progressing     
  Problem: Safety - Adult  Goal: Free from fall injury  Outcome: Progressing     Problem: Pain  Goal: Verbalizes/displays adequate comfort level or baseline comfort level  Outcome: Progressing     
  Problem: Safety - Adult  Goal: Free from fall injury  Outcome: Progressing  Flowsheets (Taken 4/17/2024 1819)  Free From Fall Injury: Instruct family/caregiver on patient safety     Problem: Pain  Goal: Verbalizes/displays adequate comfort level or baseline comfort level  Outcome: Progressing  Flowsheets (Taken 4/17/2024 1819)  Verbalizes/displays adequate comfort level or baseline comfort level:   Encourage patient to monitor pain and request assistance   Assess pain using appropriate pain scale   Administer analgesics based on type and severity of pain and evaluate response   Implement non-pharmacological measures as appropriate and evaluate response   Uche Corrigan RN  4/17/2024    
Worker, Psychosocial CNS, Spiritual Care as appropriate  Outcome: Progressing     Problem: Depression/Self Harm  Goal: Effect of psychiatric condition will be minimized and patient will be protected from self harm  Description: INTERVENTIONS:  1. Assess impact of patient's symptoms on level of functioning, self care needs and offer support as indicated  2. Assess patient/family knowledge of depression, impact on illness and need for teaching  3. Provide emotional support, presence and reassurance  4. Assess for possible suicidal thoughts or ideation. If patient expresses suicidal thoughts or statements do not leave alone, initiate Suicide Precautions, move to a room close to the nursing station and obtain sitter  5. Initiate consults as appropriate with Mental Health Professional, Spiritual Care, Psychosocial CNS, and consider a recommendation to the [facility] for a Psychiatric Consultation  Outcome: Progressing     Problem: Spiritual Care  Goal: Pt/Family able to move forward in process of forgiving self, others, and/or higher power  Description: INTERVENTIONS:  1. Assist patient/family to overcome blocks to healing by use of spiritual practices (prayer, meditation, guided imagery, reiki, breath work, etc).  2. De-myth guilt and help patient/family identify possible irrational spiritual/cultural beliefs and values.  3. Explore possibilities of making amends & reconciliation with self, others, and/or a greater power.  4. Guide patient/family in identifying painful feelings of guilt.  5. Help patient/famiy explore and identify spiritual beliefs, cultural understandings or values that may help or hinder letting go of issue.  6. Help patient/family explore feelings of anger, bitterness, resentment.  7. Help patient/family identify and examine the situation in which these feelings are experienced.  8. Help patient/family identify destructive displacement of feelings onto other individuals.  9. Invite use of 
patient/family identify possible irrational spiritual/cultural beliefs and values.  3. Explore possibilities of making amends & reconciliation with self, others, and/or a greater power.  4. Guide patient/family in identifying painful feelings of guilt.  5. Help patient/famiy explore and identify spiritual beliefs, cultural understandings or values that may help or hinder letting go of issue.  6. Help patient/family explore feelings of anger, bitterness, resentment.  7. Help patient/family identify and examine the situation in which these feelings are experienced.  8. Help patient/family identify destructive displacement of feelings onto other individuals.  9. Invite use of sacraments/rituals/ceremonies as appropriate (e.g. - confession, anointing, smudging).  10. Refer patient/family to formal counseling and/or to tracey community for further support work.  Outcome: Progressing     
sacraments/rituals/ceremonies as appropriate (e.g. - confession, anointing, smudging).  10. Refer patient/family to formal counseling and/or to tracey community for further support work.  Outcome: Progressing     
power  Description: INTERVENTIONS:  1. Assist patient/family to overcome blocks to healing by use of spiritual practices (prayer, meditation, guided imagery, reiki, breath work, etc).  2. De-myth guilt and help patient/family identify possible irrational spiritual/cultural beliefs and values.  3. Explore possibilities of making amends & reconciliation with self, others, and/or a greater power.  4. Guide patient/family in identifying painful feelings of guilt.  5. Help patient/famiy explore and identify spiritual beliefs, cultural understandings or values that may help or hinder letting go of issue.  6. Help patient/family explore feelings of anger, bitterness, resentment.  7. Help patient/family identify and examine the situation in which these feelings are experienced.  8. Help patient/family identify destructive displacement of feelings onto other individuals.  9. Invite use of sacraments/rituals/ceremonies as appropriate (e.g. - confession, anointing, smudging).  10. Refer patient/family to formal counseling and/or to tracey community for further support work.  4/22/2024 0805 by Maryellen Almeida, RN  Outcome: Progressing  4/22/2024 0019 by Homero Mccabe, RN  Outcome: Progressing
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
## 2024-08-12 NOTE — ACP (ADVANCE CARE PLANNING)
Advance Care Planning     General Advance Care Planning (ACP) Conversation    Date of Conversation: 2/28/2023  Conducted with: Patient with Decision Making Capacity    Healthcare Decision Maker:    Primary Decision Maker: Berkley Javed - [facility] - [phone]  Click here to complete Healthcare Decision Makers including selection of the Healthcare Decision Maker Relationship (ie \"Primary\"). Today we documented Decision Maker(s) consistent with Legal Next of Kin hierarchy.     Content/Action Overview:  DECLINED ACP Conversation - will revisit periodically  Reviewed DNR/DNI and patient elects Full Code (Attempt Resuscitation)        Length of Voluntary ACP Conversation in minutes:  <16 minutes (Non-Billable)    Mily Quan RN Navigator contacted by Dr Driver to add pt to his schedule as a new pt. Dr Driver would like her to come in near the end of August to review her recent + BRCA2 results. (Results not currently in pt chart)    Pt called and name and  verified. Explained my role as nurse navigator and direct number provided. Options for scheduling with Dr Driver GYN/ONC reviewed and pt scheduled to see Dr Driver. Pt updated that Navigator will work ot obtain myriad genetic testing results prior to her  appointment. Patient encouraged to call for any questions or concerns about her care or appointments. Pt verbalized understanding. Date time and location of appointment reviewed with pt.     Oncology Navigation   Intake  Cancer Type: Gynecologic (+BRCA2)  Type of Referral: -- (self)  Date of Referral: 24  Initial Nurse Navigator Contact: 24  Referral to Initial Contact Timeline (days): 0  Appointment Date: 24     Treatment  Current Status: Staging work-up  Procedures: Genetic test     Support Systems: Spouse/significant other    Providers:  PCP: Dr Karissa Zavala/ Dr Evelia Cevallos  OBGYN: Dr Israel Mckay MD ([facility] [place]  Cardiologist: Dr Eliseo Anand  Endocrinologist: Dr Vivienne Alvarado  Rheumatology: Dr Thelma Sharma  Dermatologist: Dr Ynes Alston    Medical History:  PCOS  Breast reduction (bilateral)-   Hypothyroidism- 2018  Rheumatoid factor positive  Anxiety (Dr. Clarke)    24- Mammogram  No mammographic evidence of malignancy.   BI-RADS: 2-Benign   FOLLOW-UP RECOMMENDATION: 1000 - Screening Mamm in 1 Yr.     24- Ultrasound  The uterus is anteverted and measures 4.7 x 3.4 x 2.9 cm. The endometrial stripe measures 3mm.  The cervix is unremarkable.  The right ovary measures 1.8 x 1.3 x 0.8 cm. The left ovary measures 2.5 x 1.4 x 1.3cm. The right ovary is sonographically normal. There is a cystic nodule in left ovary measuring 8 x 8 x 6 mm. There is no internal  hypervascularity. Spectral Doppler evaluation of both ovaries demonstrates normal arterial and venous waveforms        Acuity      Follow Up    Dr Anibal Mckay office contacted to fax over pt myriad results. Message taken by the office and nurse will fax results or contact navigator to discuss more. Navigator will follow up for results.

## 2024-10-01 NOTE — PROGRESS NOTES
[facility] - Oncology ([facility])  Hematology  Bone Marrow Transplant  Progress Note    Patient Name: Guillaume Salinas  Admission Date: 9/13/2024  Hospital Length of Stay: 18 days  Code Status: Full Code    Subjective:     Interval History: Day +12 from a  TBI PT Cy haplo (son) BMT for MDS. Remains afebrile. VSS. No evidence of aGVHD. Repleting mag. Main complaints are persistent diarrhea and hemorrhoid pain and perianal excoriation that is worsened by diarrhea. Receiving scheduled imodium and PRN limotil.  Adding Questran in the hopes of bulking stools. Continuing LETs and Tucks. Barrier cream provided.    Objective:     Vital Signs (Most Recent):  Temp: 98.1 °F (36.7 °C) (10/01/24 1151)  Pulse: 104 (10/01/24 1151)  Resp: 20 (10/01/24 1151)  BP: 139/88 (10/01/24 1151)  SpO2: 96 % (10/01/24 1151) Vital Signs (24h Range):  Temp:  [97.8 °F (36.6 °C)-98.3 °F (36.8 °C)] 98.1 °F (36.7 °C)  Pulse:  [] 104  Resp:  [16-20] 20  SpO2:  [90 %-96 %] 96 %  BP: (135-145)/(71-88) 139/88     Weight: 74.3 kg (163 lb 12.8 oz)  Body mass index is 24.19 kg/m².  Body surface area is 1.9 meters squared.    ECOG SCORE           [unfilled]    Intake/Output - Last 3 Shifts         09/29 0700  09/30 0659 09/30 0700  10/01 0659 10/01 0700  10/02 0659    P.O. 400 550 236    I.V. (mL/kg)       Total Intake(mL/kg) 400 (5.4) 550 (7.4) 236 (3.2)    Net +400 +550 +236           Urine Occurrence 2 x 4 x 4 x    Stool Occurrence 7 x 5 x 2 x             Physical Exam  Constitutional:       Appearance: He is well-developed.   HENT:      Head: Normocephalic and atraumatic.      Mouth/Throat:      Pharynx: No oropharyngeal exudate.      Comments: Scab to lower lip  Eyes:      General:         Right eye: No discharge.         Left eye: No discharge.      Conjunctiva/sclera: Conjunctivae normal.      Pupils: Pupils are equal, round, and reactive to light.   Cardiovascular:      Rate and Rhythm: Normal rate and regular rhythm.      Heart  RESPIRATORY THERAPY ASSESSMENT    Name:  Lisa Arreaga Record Number:  [id number]  Age: 23 y.o. Gender: male  : 2000  Today's Date:  2020  Room:  0207/0207-02    Assessment     Is the patient being admitted for a COPD or Asthma exacerbation? No   (If yes the patient will be seen every 4 hours for the first 24 hours and then reassessed)    Patient Admission Diagnosis      Allergies  Allergies   Allergen Reactions    Morphine Shortness Of Breath     Other reaction(s): Histamine-like Reaction  Has asthma exacerbation with morphine-histamine type reaction         Minimum Predicted Vital Capacity:               Actual Vital Capacity:                    Pulmonary History:Asthma  Home Oxygen Therapy:  room air  Home Respiratory Therapy:Albuterol PRN  Current Respiratory Therapy:  Xopenex Q6 prn  Treatment Type: HHN  Medications: Levalbuterol HCL    Respiratory Severity Index(RSI)   Patients with orders for inhalation medications, oxygen, or any therapeutic treatment modality will be placed on Respiratory Protocol. They will be assessed with the first treatment and at least every 72 hours thereafter. The following severity scale will be used to determine frequency of treatment intervention.     Smoking History: Pulmonary Disease or Smoking History, Greater than 15 pack year = 2    Social History  Social History     Tobacco Use    Smoking status: Never Smoker    Smokeless tobacco: Never Used   Substance Use Topics    Alcohol use: Not Currently    Drug use: Never       Recent Surgical History: None = 0  Past Surgical History  Past Surgical History:   Procedure Laterality Date    SPLENECTOMY         Level of Consciousness: Alert, Oriented, and Cooperative = 0    Level of Activity: Walking unassisted = 0    Respiratory Pattern: Regular Pattern; RR 8-20 = 0    Breath Sounds: Absent bilaterally and/or with wheezes = 3    Sputum   ,  ,    Cough: Strong, spontaneous, non-productive = 0    Vital Signs   /66   Pulse 92   Temp 98.4 °F (36.9 °C) (Temporal)   Resp 16   Ht 5' 8\" (1.727 m)   Wt 188 lb (85.3 kg)   SpO2 100%   BMI 28.59 kg/m²   SPO2 (COPD values may differ): 90-91% on room air or greater than 92% on FiO2 24- 28% = 1    Peak Flow (asthma only): not applicable = 0    RSI: 5-6 = Q4hr PRN (every four hours as needed) for dyspnea        Plan       Goals: medication delivery and improve oxygenation    Patient/caregiver was educated on the proper method of use for Respiratory Care Devices:  Yes      Level of patient/caregiver understanding able to:   ? Verbalize understanding   ? Demonstrate understanding       ? Teach back        ? Needs reinforcement       ? No available caregiver               ? Other:     Response to education:  Excellent     Is patient being placed on Home Treatment Regimen? No     Does the patient have everything they need prior to discharge? Yes     Comments: patient assessed chart reviewed. Plan of Care: xopenex TID and PRN patient wheezing    Electronically signed by Everett Sunshine RCP on 12/4/2020 at 4:23 PM    Respiratory Protocol Guidelines     1. Assessment and treatment by Respiratory Therapy will be initiated for medication and therapeutic interventions upon initiation of aerosolized medication. 2. Physician will be contacted for respiratory rate (RR) greater than 35 breaths per minute. Therapy will be held for heart rate (HR) greater than 140 beats per minute, pending direction from physician. 3. Bronchodilators will be administered via Metered Dose Inhaler (MDI) with spacer when the following criteria are met:  a. Alert and cooperative     b. HR < 140 bpm  c. RR < 30 bpm                d. Can demonstrate a 2-3 second inspiratory hold  4. Bronchodilators will be administered via Hand Held Nebulizer [facility]) to patients when ANY of the following criteria are met  a. Incognizant or uncooperative          b. Patients treated with HHN at Home        c.  Unable sounds: Normal heart sounds. No murmur heard.  Pulmonary:      Effort: Pulmonary effort is normal. No respiratory distress.      Breath sounds: Normal breath sounds. No wheezing or rales.   Abdominal:      General: Bowel sounds are normal. There is no distension.      Palpations: Abdomen is soft.      Tenderness: There is no abdominal tenderness.   Musculoskeletal:         General: No deformity. Normal range of motion.      Cervical back: Normal range of motion and neck supple.   Skin:     General: Skin is warm and dry.      Findings: No erythema or rash.      Comments: Right chest wall vas cath. Dressing c/d/i. No sign of infection to site.   Neurological:      Mental Status: He is alert and oriented to person, place, and time.   Psychiatric:         Behavior: Behavior normal.         Thought Content: Thought content normal.         Judgment: Judgment normal.            Significant Labs:   CBC:   Recent Labs   Lab 09/30/24  0428 10/01/24  0414   WBC 0.01* 0.01*   HGB 7.9* 7.5*   HCT 22.9* 21.5*   PLT 24* 12*    and CMP:   Recent Labs   Lab 09/30/24 0428 10/01/24  0414    139   K 3.6 4.3   * 111*   CO2 22* 23   * 114*   BUN 7* 10   CREATININE 0.7 0.7   CALCIUM 8.6* 8.7   PROT 5.7* 5.6*   ALBUMIN 2.9* 2.9*   BILITOT 0.7 0.7   ALKPHOS 59 57   AST 17 15   ALT 15 14   ANIONGAP 8 5*       Diagnostic Results:  I have reviewed all pertinent imaging results/findings within the past 24 hours.  Assessment/Plan:     * S/P allogeneic bone marrow transplant  - Patient of Dr. Paco Hickey with MDS  - Admitted 9/13/24 for a  TBI PT Cy haplo (son) allogeneic SCT. Today is Day +12  - Tacro level 9.3 on 9/30; continued tacro dose 1mg BID; next level 10/2  - continue daily acute GVHD charting while inpatient  - Patient is B+. Donor is A+.  - Patient is CMV reactive. Donor is CMV reactive. Patient will start letermovir ppx on 9/24/24.  - Received fresh BMT product on 9/19/24 with a CD34 dose of 3.55 x10^6.  -  Of note, cilostazol may interact with tacro. (Currently on hold for plts < 50K).  - See treatment plan below:    Planned conditioning regimen:  Fludarabine on Day -5, -4, -3, and -2  Melphalan on Day -2  TBI on Day -1     Planned  GVHD Prophylaxis:  Cyclophosphamide (with [place]) on Days +3 and +4  Mycophenolate starting on Day +5  Tacrolimus starting on Day +5     Antimicrobial Prophylaxis:  Acyclovir starting on Day -5  Levofloxacin starting on Day -1  Micafungin on Day -1 through Day +4  Letermovir starting on Day +5 (if CMV PCR drawn on day 0 results negative)  Posaconazole starting on Day +5  Bactrim starting on Day +30     SOS/VOD Prophylaxis:  Ursodiol on Day -5 through Day +30      Growth Factor Support:  Neupogen starting on Day +5     Caregiver: wife   Post-transplant discharge plans: home        Myelodysplastic syndrome  From Dr Hickey's clinic note 8/5:  Stem cell transplant candidate: We discussed the role for allogeneic stem cell transplantation in this disease process as a potentially curative option. We had an extensive discussion about the rationale, logistics, risks, and benefits. We reviewed the requirement to stay in the [place] area for 100 days with a caregiver at all times. We discussed the risks, including infection, graft failure, organ toxicity, graft versus host disease, relapse of disease, and secondary cancers. We reviewed the need for long-term immunosuppression and need for close monitoring. HCT-CI of 1 (intermediate risk). We had a prolonged discussion today regarding his recent deconditioning, Cdiff, and underlying disease. He is now much improved since last seen, and is improving his strength since starting to work with PT. Diarrhea now controlled. We discussed that our plan to move forward with the transplant process.   Coordinator: Fay Stephens  Regimen:  + 2Gy TBI  Donor: son (haplo)   Graft source: BM  - Admitted 9/13/24 for a  TBI PT Cy haplo (son) allogeneic  to demonstrate proper use of MDI with spacer     d. RR > 30 bpm   5. Bronchodilators will be delivered via Metered Dose Inhaler (MDI), HHN, Aerogen to intubated patients on mechanical ventilation. 6. Inhalation medication orders will be delivered and/or substituted as outlined below. Aerosolized Medications Ordering and Administration Guidelines:    1. All Medications will be ordered by a physician, and their frequency and/or modality will be adjusted as defined by the patients Respiratory Severity Index (RSI) score. 2. If the patient does not have documented COPD, consider discontinuing anticholinergics when RSI is less than 9.  3. If the bronchospasm worsens (increased RSI), then the bronchodilator frequency can be increased to a maximum of every 4 hours. If greater than every 4 hours is required, the physician will be contacted. 4. If the bronchospasm improves, the frequency of the bronchodilator can be decreased, based on the patient's RSI, but not less than home treatment regimen frequency. 5. Bronchodilator(s) will be discontinued if patient has a RSI less than 9 and has received no scheduled or as needed treatment for 72  Hrs. Patients Ordered on a Mucolytic Agent:    1. Must always be administered with a bronchodilator. 2. Discontinue if patient experiences worsened bronchospasm, or secretions have lessened to the point that the patient is able to clear them with a cough. Anti-inflammatory and Combination Medications:    1. If the patient lacks prior history of lung disease, is not using inhaled anti-inflammatory medication at home, and lacks wheezing by examination or by history for at least 24 hours, contact physician for possible discontinuation. BMT    Pancytopenia due to antineoplastic chemotherapy  - Daily CBC while inpatient  - Transfuse for hgb <7 Plt  or <10K  - Continue antimicrobial ppx  - Holding cilostazol for plts < 50K    Chemotherapy induced diarrhea  - C-diff neg 9/24  - Of note, was treated empirically for c-diff with oral vanc prior to admission and is receiving oral vanc ppx while admitted.  - Continue scheduled imodium and PRN lomotil  - Starting Questran today (10/1).    Neutropenic fever  - First fever with tmax 101.8F on 9/23  - Infection w/u unremarkable thus far  - No fevers since 9/23, Cefepime stopped on 9/25 and back on oral ppx LVQ.  RESOLVED    Coronary artery disease involving native coronary artery of native heart without angina pectoris  - Was holding home Coreg for hypotension. Resumed 9/30.    Hypomagnesemia  - See electrolyte disorder  - Tacro likely contributing.    Electrolyte disorder  - Replete per PRN electrolyte order set  - Daily CMP, mag, and phos while inpatient    Moderate malnutrition  Nutrition consulted. Most recent weight and BMI monitored-     Measurements:  Wt Readings from Last 1 Encounters:   10/01/24 74.3 kg (163 lb 12.8 oz)   Body mass index is 24.19 kg/m².    Patient has been screened and assessed by RD.    - Switched boost plus to boost breeze as diary of plus making patient nauseous  - continue IVF; rate increased to 100ml/hr  - PO intake as tolerated; on scheduled antiemetics for nausea      Dry mouth  - continue to moisten lips with chapstick  - encouraged use of oral hygiene rinses to sterilize mouth and stimulate saliva production    Urinary frequency  - reports increased nightly frequency and urgency with low output  - no formal BPH diagnosis; started flomax 9/27  - monitor for improvement (can increase dose if not effective)    Chemotherapy-induced nausea  - scheduled zofran IV 8mg q8h on 9/27; added scheduled compazine 9/30  - has prn ativan    Hemorrhoids  - d/t chemo induced diarrhea  - has  anusol, tucks pads, and LETS gel  - patient denies any bleeding at site  - has PRN morphine for pain control    Headache  - C/o headache 9/25  - Daily coffee drinker. Improved after drinking coffee.  - Suspect caffeine withdrawal.  - Can start caffeine tablet if patient is unable to drink coffee due to chemo side effects  - none today    Insomnia  - has PRN Trazodone however not currently using  - started on scheduled melatonin    History of Clostridioides difficile infection  - Was treated empirically for c-diff with oral vanc prior to admission.  - Continue oral vanc ppx per transplant protocol      Neuropathy  - Continue home gabapentin    Hypertension, essential  - Was holding home Coreg for fluid responsive hypotension. Resumed 9/30.        VTE Risk Mitigation (From admission, onward)           Ordered     heparin, porcine (PF) 100 unit/mL injection flush 300 Units  As needed (PRN)         09/13/24 1645                    Disposition: Inpatient for allogeneic BMT. Awaiting neutrophil engraftment.    Luz Napoles, NP  Bone Marrow Transplant  [facility] - Oncology ([facility])

## 2024-12-31 NOTE — PROGRESS NOTES
PCA set up as ordered. Patient instructed on use of. Pco2 monitor in place. Will continue to monitor. No

## 2025-01-28 NOTE — CONSULTS
[facility]    Consult Note      Requesting Physician:  Dr. Citlaly Maciel:  sickle cell anemia        HISTORY OF PRESENT ILLNESS:      Mr. Leonora Simms  is a 79-year-old sickle cell anemia who was admitted for priapism and anemia. He feels better this morning.       Past Medical History:    Past Medical History:   Diagnosis Date    Accidental overdose     Asthma     DVT (deep venous thrombosis) ([address] 10/19/2021    R leg    Enlarged heart     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)     Sickle cell anemia (HCC)      Past Surgical History:    Past Surgical History:   Procedure Laterality Date    SPLENECTOMY         Current Medications:    Current Facility-Administered Medications   Medication Dose Route Frequency Provider Last Rate Last Admin    ketorolac (TORADOL) injection 30 mg  30 mg IntraVENous TID Anny Xiao MD   30 mg at 10/23/22 8997    phenylephrine 1 MG/10ML injection 0.1 mg  0.1 mg IntraVENous Q5 Min PRN Natasha Gutierrez, APRN - CNP        pantoprazole (PROTONIX) tablet 40 mg  40 mg Oral QAM AC Nils Abbott MD   40 mg at 10/23/22 0535    hydroxyurea (HYDREA) chemo capsule 1,000 mg  1,000 mg Oral Daily Nils Abbott MD        folic acid (FOLVITE) tablet 2 mg  2 mg Oral Daily Nils Abbott MD   2 mg at 10/23/22 0903    sodium chloride flush 0.9 % injection 5-40 mL  5-40 mL IntraVENous 2 times per day Christ Silver MD   10 mL at 10/23/22 0903    sodium chloride flush 0.9 % injection 5-40 mL  5-40 mL IntraVENous PRN Nils Abbott MD        0.9 % sodium chloride infusion   IntraVENous PRN Nils Abbott MD        enoxaparin (LOVENOX) injection 40 mg  40 mg SubCUTAneous Daily Nils Abbott MD   40 mg at 10/22/22 1602    ondansetron (ZOFRAN-ODT) disintegrating tablet 4 mg  4 mg Oral Q8H PRN Nils Abbott MD        Or    ondansetron (ZOFRAN) injection 4 mg  4 mg IntraVENous Q6H PRN Bill Pennington MD        polyethylene glycol (GLYCOLAX) packet 17 g  17 g Oral Daily PRN Nils Abbott MD        acetaminophen (TYLENOL) tablet 650 mg  650 mg Oral Q6H PRN Nils Abbott MD        Or    acetaminophen (TYLENOL) suppository 650 mg  650 mg Rectal Q6H PRN Nils Abbott MD        albuterol (PROVENTIL) nebulizer solution 2.5 mg  2.5 mg Nebulization Q4H PRN Nils Abbott MD        0.9 % sodium chloride infusion   IntraVENous Continuous Nils Abbott  mL/hr at 10/23/22 0910 Rate Verify at 10/23/22 0910    0.9 % sodium chloride infusion   IntraVENous PRN Nils Abbott MD        oxyCODONE-acetaminophen (PERCOCET) 5-325 MG per tablet 1 tablet  1 tablet Oral Q4H PRN Bill Pennington MD   1 tablet at 10/22/22 2155    HYDROmorphone (DILAUDID) injection 1 mg  1 mg IntraVENous Q4H PRN Bill Pennington MD   1 mg at 10/23/22 1871     Allergies:     Allergies   Allergen Reactions    Morphine Shortness Of Breath     Other reaction(s): Histamine-like Reaction  Has asthma exacerbation with morphine-histamine type reaction         Social History:   Social History     Socioeconomic History    Marital status: Single     Spouse name: Not on file    Number of children: 0    Years of education: Not on file    Highest education level: Not on file   Occupational History    Not on file   Tobacco Use    Smoking status: Never    Smokeless tobacco: Never   Vaping Use    Vaping Use: Never used   Substance and Sexual Activity    Alcohol use: Not Currently    Drug use: Never    Sexual activity: Not Currently   Other Topics Concern    Not on file   Social History Narrative    Not on file     Social Determinants of Health     Financial Resource Strain: Not on file   Food Insecurity: Not on file   Transportation Needs: Not on file   Physical Activity: Not on file   Stress: Not on file   Social Connections: Not on file   Intimate Partner Violence: Not on file   Housing Stability: Not on file          Family History:     Family History   Problem Relation Age of Onset    Other Father         sarcoidosis     REVIEW OF SYSTEMS:      Constitutional:  No weight loss, No fever, No chills, No night sweats. Energy level good.   Eyes:  No impairment or change in vision  ENT / Mouth:  No pain, abnormal ulceration, bleeding, nasal drip or change in voice or hearing  Cardiovascular:  No chest pain, palpitations, new edema, or calf discomfort  Respiratory:  No pain, hemoptysis, change to breathing  Breast:  No pain, discharge, change in appearance or texture  Gastrointestinal:  No pain, cramping, jaundice, change to eating and bowel habits  Urinary:  No pain, bleeding or change in continence  Genitalia: No pain, bleeding or discharge  Musculoskeletal:  No redness, pain, edema or weakness  Skin:  No pruritus, rash, change to nodules or lesions  Neurologic:  No discomfort, change in mental status, speech, sensory or motor activity  Psychiatric:  No change in concentration or change to affect or mood  Endocrine:  No hot flashes, increased thirst, or change to urine production  Hematologic: No petechiae, ecchymosis or bleeding  Lymphatic:  No lymphadenopathy or lymphedema  Allergy / Immunologic:  No eczema, hives, frequent or recurrent infections    PHYSICAL EXAM:      Vitals:  BP (!) 108/54   Pulse 74   Temp 97.9 °F (36.6 °C) (Oral)   Resp 16   Ht 5' 9\" (1.753 m)   Wt 199 lb 8.3 oz (90.5 kg)   SpO2 92%   BMI 29.46 kg/m²   CONSTITUTIONAL: awake, alert, cooperative, no apparent distress   EYES: pupils equal, round and reactive to light, sclera clear and conjunctiva normal  ENT: Normocephalic, without obvious abnormality, atraumatic  NECK: supple, symmetrical, no jugular venous distension and no carotid bruits   HEMATOLOGIC/LYMPHATIC: no cervical, supraclavicular or axillary lymphadenopathy   LUNGS: no increased work of breathing and clear to auscultation   CARDIOVASCULAR: regular rate and rhythm, normal S1 and S2, no murmur noted  ABDOMEN: normal bowel sounds x 4, sickle-cell disease ([facility] [address]    Priapism       IMPRESSION/RECOMMENDATIONS:    Sickle cell anemia:  -Currently on Hydrea 1000 mg p.o. daily  -Crizanlizumab was not effective and was stopped  -Have not been able to get him Charmaine Goldstein, but will try again  -This crisis appears to be minor    Compliance:  -He refuses to seek counseling  -He refuses a support group  -He has been fired from at least 1 pain management group and cannot get another one to take him    Pain:  -He takes oxycodone 5 mg to 10 mg p.o. q. 6 as needed  -He receives 50 a week and we are strictly adherent to this  -I have actually gotten him off MS Contin and he had a problem with an accidental overdose when he was taking MS Contin  -His prescriptions are filled weekly  -We do have trouble that he will take his medications too quickly and a lot of times will show up in the emergency department  -I try to supplement him with Toradol as an outpatient and in the emergency department to help with narcotic use  -I will occasionally give him 1 or 2 days of MS Contin when he has a crisis    Anemia:  -I agree with packed red blood cells        Thank you for asking me to see the patient.        Gareth Lawrence MD  Please Contact Through [employer] Yes

## 2025-07-08 NOTE — PLAN OF CARE
Problem: Discharge Planning  Goal: Discharge to home or other facility with appropriate resources  3/1/2023 2306 by Stefano Benson RN  Outcome: Progressing     Problem: Pain  Goal: Verbalizes/displays adequate comfort level or baseline comfort level  3/2/2023 1113 by Alvarez Severino RN  Outcome: Progressing  3/1/2023 2306 by Stefano Benson RN  Outcome: Progressing     Problem: Safety - Adult  Goal: Free from fall injury  3/2/2023 1113 by Alvarez Severino RN  Outcome: Progressing  3/1/2023 2306 by Stefano Benson RN  Outcome: Progressing     Problem: ABCDS Injury Assessment  Goal: Absence of physical injury  3/2/2023 1113 by Alvarez Severino RN  Outcome: Progressing  3/1/2023 2306 by Stefano Benson RN  Outcome: Progressing Sheath was inserted in the right femoral artery. Ultrasound guidance was used.

## (undated) DEVICE — SUTURE MCRYL + SZ 3-0 L27IN ABSRB UD L26MM SH 1/2 CIR MCP416H

## (undated) DEVICE — 450 ML BOTTLE OF 0.05% CHLORHEXIDINE GLUCONATE IN 99.95% STERILE WATER FOR IRRIGATION, USP AND APPLICATOR.: Brand: IRRISEPT ANTIMICROBIAL WOUND LAVAGE

## (undated) DEVICE — BANDAGE WND 3INX5YD CO FLX FOAM

## (undated) DEVICE — BLADE CARBON-STEEL #10 STRL TWIN-BLADE

## (undated) DEVICE — SUTURE PERMA-HAND SZ 2-0 L30IN NONABSORBABLE BLK L26MM SH K833H

## (undated) DEVICE — DRAPE,UTILITY,TAPE,15X26,STERILE: Brand: MEDLINE

## (undated) DEVICE — TIDISHIELD UROLOGY DRAIN BAGS FROSTY VINYL STERILE FITS SIEMENS UROSKOP ACCESS 20 PER CASE: Brand: TIDISHIELD

## (undated) DEVICE — DRAIN SURG FLAT W7MMXL20CM FULL PERF

## (undated) DEVICE — SHEET,DRAPE,53X77,STERILE: Brand: MEDLINE

## (undated) DEVICE — PLUG,CATHETER,DRAINAGE PROTECTOR,TUBE: Brand: MEDLINE

## (undated) DEVICE — BLADE ES L2.75IN ELASTOMERIC COAT DURABLE BEND UPTO 90DEG

## (undated) DEVICE — SUTURE VCRL + SZ 3-0 L27IN ABSRB UD L26MM SH 1/2 CIR VCP416H

## (undated) DEVICE — MINOR SET UP PK

## (undated) DEVICE — RESERVOIR,SUCTION,100CC,SILICONE: Brand: MEDLINE

## (undated) DEVICE — BLADE CLIPPER GEN PURP NS

## (undated) DEVICE — 3M™ IOBAN™ 2 ANTIMICROBIAL INCISE DRAPE 6650EZ: Brand: IOBAN™ 2

## (undated) DEVICE — COVER,MAYO STAND,STERILE: Brand: MEDLINE

## (undated) DEVICE — SET BLD COLL 21GA TB L12IN NDL L0.75IN WNG GRN SIMP EZ TO

## (undated) DEVICE — GLOVE,SURG,SENSICARE SLT,LF,PF,7: Brand: MEDLINE

## (undated) DEVICE — SUTURE PDS II SZ 2-0 L18IN ABSRB VLT SH L26MM 1/2 CIR TAPR Z775D

## (undated) DEVICE — AGENT HEMSTAT W2XL4IN OXIDIZED REGENERATED CELOS ABSRB

## (undated) DEVICE — STAPLER EXT SKIN 35 WIDE S STL STPL SQUEEZE HNDL VISISTAT

## (undated) DEVICE — UNDERPANTS INCONT XL 45-70IN KNIT SEAMLESS DSGN COLOR-CODED

## (undated) DEVICE — ADHESIVE SKIN CLSR 0.7ML TOP DERMBND ADV

## (undated) DEVICE — SOLUTION IV IRRIG 500ML 0.9% SODIUM CHL 2F7123

## (undated) DEVICE — CATHETER IV 14GA L1.75IN OD2.146MM ID1.740MM ORNG VIALON

## (undated) DEVICE — CHLORAPREP 26ML ORANGE

## (undated) DEVICE — YANKAUER,OPEN TIP,W/O VENT,STERILE: Brand: MEDLINE INDUSTRIES, INC.

## (undated) DEVICE — GLOVE ORANGE PI 7   MSG9070

## (undated) DEVICE — CATHETER URETH 16FR BLLN 5CC STD LTX 2 W F SGL DRNGE EYE M

## (undated) DEVICE — LUBE JELLY FOIL PACK 1.4 OZ: Brand: MEDLINE INDUSTRIES, INC.

## (undated) DEVICE — MAJOR SET UP PK

## (undated) DEVICE — 3M™ STERI-DRAPE™ INCISE DRAPE 1050 (60CM X 45CM): Brand: STERI-DRAPE™

## (undated) DEVICE — SHEET, T, LAPAROTOMY, STERILE: Brand: MEDLINE

## (undated) DEVICE — DECANTER: Brand: UNBRANDED

## (undated) DEVICE — GLOVE,SURG,SENSICARE SLT,LF,PF,7.5: Brand: MEDLINE

## (undated) DEVICE — GOWN,REINF,POLY,AURORA,XLNG/XXL,STRL: Brand: MEDLINE

## (undated) DEVICE — STRIP WND PK W0.25INXL5YD IODO GZ TIGHTLY WVN CURAD

## (undated) DEVICE — BANDAGE,GAUZE,CONFORMING,3"X75",STRL,LF: Brand: MEDLINE

## (undated) DEVICE — SUTURE ETHLN SZ 4-0 L18IN NONABSORBABLE BLK L19MM PC-5 3/8 1894G

## (undated) DEVICE — PAD,NON-ADHERENT,3X8,STERILE,LF,1/PK: Brand: MEDLINE

## (undated) DEVICE — BANDAGE,GAUZE,4.5"X4.1YD,STERILE,LF: Brand: MEDLINE

## (undated) DEVICE — SYMMETRY® KNIFE, JAKO MICRO LARYNGEAL, SICKLE BLADE, 11 5/8 IN: Brand: SYMMETRY SURGICAL®